# Patient Record
Sex: MALE | Employment: OTHER | URBAN - METROPOLITAN AREA
[De-identification: names, ages, dates, MRNs, and addresses within clinical notes are randomized per-mention and may not be internally consistent; named-entity substitution may affect disease eponyms.]

---

## 2017-01-03 ENCOUNTER — APPOINTMENT (OUTPATIENT)
Dept: GENERAL RADIOLOGY | Facility: CLINIC | Age: 67
DRG: 286 | End: 2017-01-03
Attending: EMERGENCY MEDICINE
Payer: MEDICARE

## 2017-01-03 ENCOUNTER — HOSPITAL ENCOUNTER (INPATIENT)
Facility: CLINIC | Age: 67
LOS: 17 days | Discharge: HOME HEALTH | DRG: 286 | End: 2017-01-22
Attending: EMERGENCY MEDICINE | Admitting: INTERNAL MEDICINE
Payer: MEDICARE

## 2017-01-03 DIAGNOSIS — M62.81 GENERALIZED MUSCLE WEAKNESS: ICD-10-CM

## 2017-01-03 DIAGNOSIS — Z98.61 CAD S/P PERCUTANEOUS CORONARY ANGIOPLASTY: ICD-10-CM

## 2017-01-03 DIAGNOSIS — B20 HUMAN IMMUNODEFICIENCY VIRUS (HIV) DISEASE (H): ICD-10-CM

## 2017-01-03 DIAGNOSIS — I25.10 CAD S/P PERCUTANEOUS CORONARY ANGIOPLASTY: ICD-10-CM

## 2017-01-03 DIAGNOSIS — R53.81 PHYSICAL DECONDITIONING: ICD-10-CM

## 2017-01-03 DIAGNOSIS — N18.6 ESRD (END STAGE RENAL DISEASE) ON DIALYSIS (H): ICD-10-CM

## 2017-01-03 DIAGNOSIS — J96.90 RESPIRATORY FAILURE, UNSPECIFIED CHRONICITY, UNSPECIFIED WHETHER WITH HYPOXIA OR HYPERCAPNIA (H): ICD-10-CM

## 2017-01-03 DIAGNOSIS — Z99.2 ESRD (END STAGE RENAL DISEASE) ON DIALYSIS (H): ICD-10-CM

## 2017-01-03 DIAGNOSIS — R07.9 CHEST PAIN, UNSPECIFIED TYPE: ICD-10-CM

## 2017-01-03 DIAGNOSIS — J18.9 PNEUMONIA OF BOTH LUNGS DUE TO INFECTIOUS ORGANISM, UNSPECIFIED PART OF LUNG: Primary | ICD-10-CM

## 2017-01-03 DIAGNOSIS — R06.02 SOB (SHORTNESS OF BREATH): ICD-10-CM

## 2017-01-03 LAB
ANION GAP SERPL CALCULATED.3IONS-SCNC: 10 MMOL/L (ref 3–14)
BASOPHILS # BLD AUTO: 0 10E9/L (ref 0–0.2)
BASOPHILS NFR BLD AUTO: 0.2 %
BUN SERPL-MCNC: 27 MG/DL (ref 7–30)
CALCIUM SERPL-MCNC: 9 MG/DL (ref 8.5–10.1)
CHLORIDE SERPL-SCNC: 93 MMOL/L (ref 94–109)
CO2 BLDCOV-SCNC: 30 MMOL/L (ref 21–28)
CO2 SERPL-SCNC: 30 MMOL/L (ref 20–32)
CREAT SERPL-MCNC: 4.37 MG/DL (ref 0.66–1.25)
DIFFERENTIAL METHOD BLD: ABNORMAL
EOSINOPHIL # BLD AUTO: 0.1 10E9/L (ref 0–0.7)
EOSINOPHIL NFR BLD AUTO: 1.3 %
ERYTHROCYTE [DISTWIDTH] IN BLOOD BY AUTOMATED COUNT: 16.1 % (ref 10–15)
GFR SERPL CREATININE-BSD FRML MDRD: 14 ML/MIN/1.7M2
GLUCOSE BLDC GLUCOMTR-MCNC: 152 MG/DL (ref 70–99)
GLUCOSE BLDC GLUCOMTR-MCNC: 206 MG/DL (ref 70–99)
GLUCOSE SERPL-MCNC: 164 MG/DL (ref 70–99)
HCT VFR BLD AUTO: 23 % (ref 40–53)
HGB BLD-MCNC: 7.5 G/DL (ref 13.3–17.7)
IMM GRANULOCYTES # BLD: 0.1 10E9/L (ref 0–0.4)
IMM GRANULOCYTES NFR BLD: 0.6 %
INTERPRETATION ECG - MUSE: NORMAL
LACTATE BLD-SCNC: 1.6 MMOL/L (ref 0.7–2.1)
LYMPHOCYTES # BLD AUTO: 0.5 10E9/L (ref 0.8–5.3)
LYMPHOCYTES NFR BLD AUTO: 6 %
MCH RBC QN AUTO: 28.5 PG (ref 26.5–33)
MCHC RBC AUTO-ENTMCNC: 32.6 G/DL (ref 31.5–36.5)
MCV RBC AUTO: 88 FL (ref 78–100)
MONOCYTES # BLD AUTO: 0.7 10E9/L (ref 0–1.3)
MONOCYTES NFR BLD AUTO: 7.5 %
NEUTROPHILS # BLD AUTO: 7.6 10E9/L (ref 1.6–8.3)
NEUTROPHILS NFR BLD AUTO: 84.4 %
NRBC # BLD AUTO: 0 10*3/UL
NRBC BLD AUTO-RTO: 0 /100
PCO2 BLDV: 42 MM HG (ref 40–50)
PH BLDV: 7.47 PH (ref 7.32–7.43)
PLATELET # BLD AUTO: 139 10E9/L (ref 150–450)
PO2 BLDV: 29 MM HG (ref 25–47)
POTASSIUM SERPL-SCNC: 3.3 MMOL/L (ref 3.4–5.3)
RBC # BLD AUTO: 2.63 10E12/L (ref 4.4–5.9)
SAO2 % BLDV FROM PO2: 59 %
SODIUM SERPL-SCNC: 133 MMOL/L (ref 133–144)
TROPONIN I SERPL-MCNC: NORMAL UG/L (ref 0–0.04)
WBC # BLD AUTO: 9 10E9/L (ref 4–11)

## 2017-01-03 PROCEDURE — 71020 XR CHEST 2 VW: CPT

## 2017-01-03 PROCEDURE — 96372 THER/PROPH/DIAG INJ SC/IM: CPT

## 2017-01-03 PROCEDURE — 83605 ASSAY OF LACTIC ACID: CPT

## 2017-01-03 PROCEDURE — 25000132 ZZH RX MED GY IP 250 OP 250 PS 637: Mod: GY | Performed by: INTERNAL MEDICINE

## 2017-01-03 PROCEDURE — 82803 BLOOD GASES ANY COMBINATION: CPT

## 2017-01-03 PROCEDURE — 85025 COMPLETE CBC W/AUTO DIFF WBC: CPT | Performed by: EMERGENCY MEDICINE

## 2017-01-03 PROCEDURE — 83036 HEMOGLOBIN GLYCOSYLATED A1C: CPT | Performed by: INTERNAL MEDICINE

## 2017-01-03 PROCEDURE — 80048 BASIC METABOLIC PNL TOTAL CA: CPT | Performed by: EMERGENCY MEDICINE

## 2017-01-03 PROCEDURE — 36415 COLL VENOUS BLD VENIPUNCTURE: CPT | Performed by: INTERNAL MEDICINE

## 2017-01-03 PROCEDURE — 84484 ASSAY OF TROPONIN QUANT: CPT | Performed by: EMERGENCY MEDICINE

## 2017-01-03 PROCEDURE — 93005 ELECTROCARDIOGRAM TRACING: CPT

## 2017-01-03 PROCEDURE — 99223 1ST HOSP IP/OBS HIGH 75: CPT | Mod: AI | Performed by: INTERNAL MEDICINE

## 2017-01-03 PROCEDURE — 99285 EMERGENCY DEPT VISIT HI MDM: CPT | Mod: 25

## 2017-01-03 PROCEDURE — 84484 ASSAY OF TROPONIN QUANT: CPT | Performed by: INTERNAL MEDICINE

## 2017-01-03 PROCEDURE — 00000146 ZZHCL STATISTIC GLUCOSE BY METER IP

## 2017-01-03 PROCEDURE — G0378 HOSPITAL OBSERVATION PER HR: HCPCS

## 2017-01-03 PROCEDURE — A9270 NON-COVERED ITEM OR SERVICE: HCPCS | Mod: GY | Performed by: INTERNAL MEDICINE

## 2017-01-03 PROCEDURE — 25000132 ZZH RX MED GY IP 250 OP 250 PS 637: Mod: GY

## 2017-01-03 PROCEDURE — A9270 NON-COVERED ITEM OR SERVICE: HCPCS | Mod: GY

## 2017-01-03 RX ORDER — NICOTINE POLACRILEX 4 MG
15-30 LOZENGE BUCCAL
Status: DISCONTINUED | OUTPATIENT
Start: 2017-01-03 | End: 2017-01-15

## 2017-01-03 RX ORDER — FOLIC AC/VIT BCOMP,C/ZN/VIT D3 0.8MG-2
1 TABLET ORAL DAILY
COMMUNITY
End: 2017-08-11

## 2017-01-03 RX ORDER — DAPSONE 25 MG/1
100 TABLET ORAL AT BEDTIME
Status: DISCONTINUED | OUTPATIENT
Start: 2017-01-03 | End: 2017-01-09

## 2017-01-03 RX ORDER — GABAPENTIN 300 MG/1
300 CAPSULE ORAL EVERY MORNING
Status: DISCONTINUED | OUTPATIENT
Start: 2017-01-04 | End: 2017-01-07

## 2017-01-03 RX ORDER — HYDROMORPHONE HCL/0.9% NACL/PF 0.2MG/0.2
0.2 SYRINGE (ML) INTRAVENOUS
Status: DISCONTINUED | OUTPATIENT
Start: 2017-01-03 | End: 2017-01-05

## 2017-01-03 RX ORDER — ISOSORBIDE MONONITRATE 30 MG/1
60 TABLET, EXTENDED RELEASE ORAL EVERY EVENING
Status: DISCONTINUED | OUTPATIENT
Start: 2017-01-03 | End: 2017-01-07

## 2017-01-03 RX ORDER — CLOPIDOGREL BISULFATE 75 MG/1
75 TABLET ORAL EVERY EVENING
Status: DISCONTINUED | OUTPATIENT
Start: 2017-01-03 | End: 2017-01-22 | Stop reason: HOSPADM

## 2017-01-03 RX ORDER — ABACAVIR 300 MG/1
600 TABLET ORAL EVERY EVENING
Status: DISCONTINUED | OUTPATIENT
Start: 2017-01-03 | End: 2017-01-22 | Stop reason: HOSPADM

## 2017-01-03 RX ORDER — MAGNESIUM OXIDE 400 MG/1
400 TABLET ORAL AT BEDTIME
Status: DISCONTINUED | OUTPATIENT
Start: 2017-01-03 | End: 2017-01-09

## 2017-01-03 RX ORDER — CHLORHEXIDINE GLUCONATE ORAL RINSE 1.2 MG/ML
15 SOLUTION DENTAL 2 TIMES DAILY
Status: DISCONTINUED | OUTPATIENT
Start: 2017-01-03 | End: 2017-01-22 | Stop reason: HOSPADM

## 2017-01-03 RX ORDER — CARVEDILOL 12.5 MG/1
12.5 TABLET ORAL 2 TIMES DAILY WITH MEALS
Status: DISCONTINUED | OUTPATIENT
Start: 2017-01-03 | End: 2017-01-07

## 2017-01-03 RX ORDER — AMLODIPINE BESYLATE 5 MG/1
5 TABLET ORAL AT BEDTIME
Status: DISCONTINUED | OUTPATIENT
Start: 2017-01-03 | End: 2017-01-07

## 2017-01-03 RX ORDER — GABAPENTIN 300 MG/1
600 CAPSULE ORAL EVERY EVENING
Status: DISCONTINUED | OUTPATIENT
Start: 2017-01-03 | End: 2017-01-07

## 2017-01-03 RX ORDER — NALOXONE HYDROCHLORIDE 0.4 MG/ML
.1-.4 INJECTION, SOLUTION INTRAMUSCULAR; INTRAVENOUS; SUBCUTANEOUS
Status: DISCONTINUED | OUTPATIENT
Start: 2017-01-03 | End: 2017-01-07

## 2017-01-03 RX ORDER — LOSARTAN POTASSIUM 100 MG/1
100 TABLET ORAL AT BEDTIME
Status: DISCONTINUED | OUTPATIENT
Start: 2017-01-03 | End: 2017-01-07

## 2017-01-03 RX ORDER — PRENATAL VIT 91/IRON/FOLIC/DHA 28-975-200
COMBINATION PACKAGE (EA) ORAL 2 TIMES DAILY PRN
Status: DISCONTINUED | OUTPATIENT
Start: 2017-01-03 | End: 2017-01-22 | Stop reason: HOSPADM

## 2017-01-03 RX ORDER — ONDANSETRON 4 MG/1
4 TABLET, ORALLY DISINTEGRATING ORAL EVERY 6 HOURS PRN
Status: DISCONTINUED | OUTPATIENT
Start: 2017-01-03 | End: 2017-01-22 | Stop reason: HOSPADM

## 2017-01-03 RX ORDER — NITROGLYCERIN 0.4 MG/1
0.4 TABLET SUBLINGUAL EVERY 5 MIN PRN
Status: DISCONTINUED | OUTPATIENT
Start: 2017-01-03 | End: 2017-01-08

## 2017-01-03 RX ORDER — FOLIC AC/VIT BCOMP,C/ZN/VIT D3 0.8MG-2
1 TABLET ORAL DAILY
Status: DISCONTINUED | OUTPATIENT
Start: 2017-01-03 | End: 2017-01-03 | Stop reason: CLARIF

## 2017-01-03 RX ORDER — MYCOPHENOLATE MOFETIL 250 MG/1
500 CAPSULE ORAL 2 TIMES DAILY
Status: DISCONTINUED | OUTPATIENT
Start: 2017-01-03 | End: 2017-01-13

## 2017-01-03 RX ORDER — RITONAVIR 100 MG/1
100 TABLET ORAL AT BEDTIME
Status: DISCONTINUED | OUTPATIENT
Start: 2017-01-03 | End: 2017-01-22 | Stop reason: HOSPADM

## 2017-01-03 RX ORDER — BISACODYL 10 MG
10 SUPPOSITORY, RECTAL RECTAL DAILY PRN
Status: DISCONTINUED | OUTPATIENT
Start: 2017-01-03 | End: 2017-01-22 | Stop reason: HOSPADM

## 2017-01-03 RX ORDER — HYDROCORTISONE ACETATE 25 MG/1
25 SUPPOSITORY RECTAL 2 TIMES DAILY PRN
Status: DISCONTINUED | OUTPATIENT
Start: 2017-01-03 | End: 2017-01-22 | Stop reason: HOSPADM

## 2017-01-03 RX ORDER — ONDANSETRON 2 MG/ML
4 INJECTION INTRAMUSCULAR; INTRAVENOUS EVERY 6 HOURS PRN
Status: DISCONTINUED | OUTPATIENT
Start: 2017-01-03 | End: 2017-01-22 | Stop reason: HOSPADM

## 2017-01-03 RX ORDER — DEXTROSE MONOHYDRATE 25 G/50ML
25-50 INJECTION, SOLUTION INTRAVENOUS
Status: DISCONTINUED | OUTPATIENT
Start: 2017-01-03 | End: 2017-01-15

## 2017-01-03 RX ORDER — ACETAMINOPHEN 325 MG/1
650 TABLET ORAL EVERY 4 HOURS PRN
Status: DISCONTINUED | OUTPATIENT
Start: 2017-01-03 | End: 2017-01-04

## 2017-01-03 RX ORDER — HYDRALAZINE HYDROCHLORIDE 25 MG/1
25 TABLET, FILM COATED ORAL 3 TIMES DAILY
Status: DISCONTINUED | OUTPATIENT
Start: 2017-01-03 | End: 2017-01-07

## 2017-01-03 RX ORDER — ASPIRIN 81 MG/1
81 TABLET, CHEWABLE ORAL EVERY EVENING
Status: DISCONTINUED | OUTPATIENT
Start: 2017-01-03 | End: 2017-01-09

## 2017-01-03 RX ORDER — CLONAZEPAM 0.5 MG/1
0.5 TABLET ORAL
Status: DISCONTINUED | OUTPATIENT
Start: 2017-01-03 | End: 2017-01-09

## 2017-01-03 RX ORDER — ATORVASTATIN CALCIUM 40 MG/1
40 TABLET, FILM COATED ORAL AT BEDTIME
Status: DISCONTINUED | OUTPATIENT
Start: 2017-01-03 | End: 2017-01-09

## 2017-01-03 RX ADMIN — ATORVASTATIN CALCIUM 40 MG: 40 TABLET, FILM COATED ORAL at 21:02

## 2017-01-03 RX ADMIN — CALCIUM ACETATE 2001 MG: 667 CAPSULE ORAL at 17:51

## 2017-01-03 RX ADMIN — RITONAVIR 100 MG: 100 TABLET, FILM COATED ORAL at 21:02

## 2017-01-03 RX ADMIN — DAPSONE 100 MG: 25 TABLET ORAL at 21:01

## 2017-01-03 RX ADMIN — TERBINAFINE HYDROCHLORIDE: 1 CREAM TOPICAL at 22:08

## 2017-01-03 RX ADMIN — GABAPENTIN 600 MG: 300 CAPSULE ORAL at 19:28

## 2017-01-03 RX ADMIN — ISOSORBIDE MONONITRATE 60 MG: 30 TABLET, EXTENDED RELEASE ORAL at 19:28

## 2017-01-03 RX ADMIN — MAGNESIUM OXIDE TAB 400 MG (241.3 MG ELEMENTAL MG) 400 MG: 400 (241.3 MG) TAB at 21:01

## 2017-01-03 RX ADMIN — ASPIRIN 81 MG 81 MG: 81 TABLET ORAL at 19:28

## 2017-01-03 RX ADMIN — MYCOPHENOLATE MOFETIL 500 MG: 250 CAPSULE ORAL at 21:01

## 2017-01-03 RX ADMIN — AMLODIPINE BESYLATE 5 MG: 5 TABLET ORAL at 21:02

## 2017-01-03 RX ADMIN — HYDRALAZINE HYDROCHLORIDE 25 MG: 25 TABLET ORAL at 17:51

## 2017-01-03 RX ADMIN — DARUNAVIR 800 MG: 800 TABLET, FILM COATED ORAL at 19:28

## 2017-01-03 RX ADMIN — CHLORHEXIDINE GLUCONATE 15 ML: 1.2 RINSE ORAL at 21:01

## 2017-01-03 RX ADMIN — CARVEDILOL 12.5 MG: 12.5 TABLET, FILM COATED ORAL at 17:51

## 2017-01-03 RX ADMIN — LOSARTAN POTASSIUM 100 MG: 100 TABLET, FILM COATED ORAL at 21:01

## 2017-01-03 RX ADMIN — HYDRALAZINE HYDROCHLORIDE 25 MG: 25 TABLET ORAL at 21:08

## 2017-01-03 RX ADMIN — CLOPIDOGREL 75 MG: 75 TABLET, FILM COATED ORAL at 19:28

## 2017-01-03 ASSESSMENT — ACTIVITIES OF DAILY LIVING (ADL)
DRESS: 0-->INDEPENDENT
BATHING: 0-->INDEPENDENT
FALL_HISTORY_WITHIN_LAST_SIX_MONTHS: NO
TRANSFERRING: 0-->INDEPENDENT
SWALLOWING: 0-->SWALLOWS FOODS/LIQUIDS WITHOUT DIFFICULTY
RETIRED_EATING: 0-->INDEPENDENT
RETIRED_COMMUNICATION: 0-->UNDERSTANDS/COMMUNICATES WITHOUT DIFFICULTY
COGNITION: 0 - NO COGNITION ISSUES REPORTED
TOILETING: 0-->INDEPENDENT
AMBULATION: 0-->INDEPENDENT

## 2017-01-03 NOTE — ED NOTES
Pt presents to ED via EMS c/o chest pain. Also reports SOB. Symptoms started early this morning. Per EMS, pt has been feeling poorly the last few days. Dialysis session earlier this morning. Pt is A&O x4, ABCs intact.

## 2017-01-03 NOTE — PHARMACY-ADMISSION MEDICATION HISTORY
Admission medication history interview status for this patient is complete. See [facility] admission navigator for allergy information, prior to admission medications and immunization status.     Medication history interview source(s):Patient  Medication history resources (including written lists, pill bottles, clinic record):None  Primary pharmacy:[facility]    Changes made to PTA medication list:  Added: fish oil, dialyvite, humalog sliding scale  Deleted: none  Changed: Cellcept - pt has been taking daily, but it is prescribed BID    Actions taken by pharmacist (provider contacted, etc):None     Additional medication history information:None    Medication reconciliation/reorder completed by provider prior to medication history? No    For patients on insulin therapy: Yes (Yes/No)  Lantus/levemir/NPH/Mix 70/30 dose:  __50___   in AM   Sliding scale Novolog Y  If Yes, do you have a baseline novolog pre-meal dose:  ____15__units with meals   Patients eat three meals a day:   Y     Any Barriers to therapy:  cost of medications/comfortable with giving injections (if applicable)/ comfortable and confident with current diabetes regimen  N      Prior to Admission medications    Medication Sig Last Dose Taking? Auth Provider   insulin lispro (HUMALOG KWIKPEN) 100 UNIT/ML injection Inject Subcutaneous 3 times daily (before meals) Sliding scale - 2 units per 50 over 150  Yes Unknown, Entered By History   Multiple Vitamins-Minerals (DIALYVITE 800/ULTRA D) TABS Take 1 tablet by mouth daily 1/2/2017 at Unknown time Yes Unknown, Entered By History   Omega-3 Fatty Acids (OMEGA-3 FISH OIL PO) Take 2 g by mouth 2 times daily (with meals) 1/2/2017 at Unknown time Yes Unknown, Entered By History   mycophenolate (CELLCEPT - GENERIC EQUIVALENT) 500 MG tablet Take 500 mg by mouth 2 times daily On an empty stomach 1/3/2017 at 0200 Yes Reported, Patient   isosorbide mononitrate (IMDUR) 30 MG 24 hr tablet Take 2 tablets (60 mg) by mouth  every evening 1/2/2017 at Unknown time Yes Glenna Fernández MD   hydrALAZINE (APRESOLINE) 50 MG tablet Take 0.5 tablets (25 mg) by mouth 3 times daily 1/2/2017 at Unknown time Yes Glenna Fernández MD   losartan (COZAAR) 100 MG tablet Take 1 tablet (100 mg) by mouth At Bedtime 1/2/2017 at Unknown time Yes Glenna Fernández MD   carvedilol (COREG) 12.5 MG tablet Take 1 tablet (12.5 mg) by mouth 2 times daily (with meals) 1/2/2017 at Unknown time Yes Glenna Fernández MD   amLODIPine (NORVASC) 5 MG tablet Take 1 tablet (5 mg) by mouth At Bedtime 1/2/2017 at Unknown time Yes Glenna Fernández MD   gabapentin (NEURONTIN) 300 MG capsule Take 600 mg by mouth every evening 1/2/2017 at Unknown time Yes Unknown, Entered By History   insulin glargine (LANTUS) 100 UNIT/ML PEN Inject 50 Units Subcutaneous every morning 1/2/2017 at Unknown time Yes Unknown, Entered By History   insulin lispro (HUMALOG KWIKPEN) 100 UNIT/ML soln Inject 15 Units Subcutaneous 3 times daily (before meals) 1/2/2017 at Unknown time Yes Unknown, Entered By History   nystatin (MYCOSTATIN) cream Apply topically daily as needed for dry skin  Yes Reported, Patient   imiquimod (ALDARA) 5 % cream Apply topically as needed  Yes Reported, Patient   DOXERCALCIFEROL IV Inject 6 mcg into the vein three times a week  Yes Reported, Patient   ASPIRIN PO Take 81 mg by mouth every evening  1/2/2017 at Unknown time Yes Unknown, Entered By History   CLONAZEPAM PO Take 0.5 mg by mouth nightly as needed for anxiety (restless legs)  Yes Unknown, Entered By History   CLOPIDOGREL BISULFATE PO Take 75 mg by mouth every evening  1/2/2017 at Unknown time Yes Unknown, Entered By History   abacavir (ZIAGEN) 300 MG tablet Take 600 mg by mouth every evening  1/2/2017 at Unknown time Yes Abstract, Provider   calcium acetate (PHOSLO) 667 MG CAPS 2,001 mg 3 times daily (with meals) Take 3 capsules three times a day with meals 1/2/2017 at Unknown time Yes Abstract, Provider   chlorhexidine  "(CHLORHEXIDINE) 0.12 % solution Rinse and gargle 15 ml by mouth twice a day as directed. 1/2/2017 at Unknown time Yes Abstract, Provider   hydrocortisone (ANUCORT-HC) 25 MG suppository Place 25 mg rectally 2 times daily as needed   Yes Abstract, Provider   terbinafine (LAMISIL AT) 1 % cream Apply topically 2 times daily as needed   Yes Abstract, Provider   atorvastatin (LIPITOR) 40 MG tablet Take 40 mg by mouth At Bedtime 1/2/2017 at Unknown time Yes Unknown, Entered By History   epoetin brittni (EPOGEN,PROCRIT) 62150 UNIT/ML injection Inject 11,000 Units Subcutaneous three times a week WITH DIALYSIS  Yes Unknown, Entered By History   iron sucrose (VENOFER) 20 MG/ML injection Inject 50 mg into the vein once a week WIth dialysis  Yes Unknown, Entered By History   MAGNESIUM OXIDE PO Take 400 mg by mouth At Bedtime 1/2/2017 at Unknown time Yes Unknown, Entered By History   dolutegravir (TIVICAY) 50 MG tablet Take 50 mg by mouth At Bedtime 1/2/2017 at Unknown time Yes Unknown, Entered By History   nitroglycerin (NITROSTAT) 0.4 MG SL tablet One tablet under the tongue every 5 minutes if needed for chest pain. May repeat every 5 minutes for a maximum of 3 doses in 15 minutes\"  Yes Lay Paz MD   gabapentin (NEURONTIN) 300 MG capsule Take 300 mg by mouth every morning  1/2/2017 at Unknown time Yes Unknown, Entered By History   dapsone 100 MG tablet Take 100 mg by mouth At Bedtime  1/2/2017 at Unknown time Yes Reported, Patient   Darunavir Ethanolate (PREZISTA PO) Take 800 mg by mouth At Bedtime. 1/2/2017 at Unknown time Yes Reported, Patient   ritonavir (NORVIR) 100 MG capsule Take 1 capsule by mouth At Bedtime  1/2/2017 at Unknown time Yes Reported, Patient             "

## 2017-01-03 NOTE — IP AVS SNAPSHOT
MRN:[id number]                      After Visit Summary   1/3/2017    Donald Raza    MRN: [id number]           Thank you!     Thank you for choosing [facility] for your care. Our goal is always to provide you with excellent care. Hearing back from our patients is one way we can continue to improve our services. Please take a few minutes to complete the written survey that you may receive in the mail after you visit. If you would like to speak to someone directly about your visit please contact Patient Relations at [phone]. Thank you!          Patient Information     Date Of Birth          1950        About your hospital stay     You were admitted on:  January 3, 2017 You last received care in the:  [place] [address] [facility]    You were discharged on:  January 22, 2017        Reason for your hospital stay       Admitted for respiratory failure secondary to bilateral Pneumonia                  Who to Call     For medical emergencies, please call 911.  For non-urgent questions about your medical care, please call your primary care provider or clinic, [phone]          Attending Provider     Provider    Ladi Baumann MD Mahoney, MD Clyde Christiansen, MD Sharee Lee Kendall D, DO Jaleta, Lorna Castillo MD       Primary Care Provider Office Phone # Fax #    [facility] [place] [facility] [phone] [address] [place] [address]        After Care Instructions     Activity       Your activity upon discharge: activity as tolerated            Diet       Follow this diet upon discharge: dialysis diet                  Follow-up Appointments     Follow-up and recommended labs and tests        Follow up with primary care provider, [facility] [place] [facility], within 7 days to evaluate medication change, to evaluate treatment change and for hospital follow- up.  No follow up labs  or test are needed.  Follow up with hemodialysis as scheduled                  Additional Services     Home Care OT Referral for Hospital Discharge       OT to eval and treat    Your provider has ordered home care - occupational therapy. If you have not been contacted within 2 days of your discharge please call the department phone number listed on the top of this document.            Home Care PT Referral for Hospital Discharge       PT to eval and treat    Your provider has ordered home care - physical therapy. If you have not been contacted within 2 days of your discharge please call the department phone number listed on the top of this document.            Home care nursing referral       RN extended hours visit. RN to assess vital signs and weight, respiratory and cardiac status, patients ability to take and record daily blood pressure, temp and weight and hydration, nutrition and bowel status.  Home health aide to assist patient on his activity of daily living    Your provider has ordered home care nursing services. If you have not been contacted within 2 days of your discharge please call the inpatient department phone number at [phone] .                  Further instructions from your care team         Pneumonia (Adult)  Pneumonia is an infection deep within the lungs. It is in the small air sacs (alveoli). Pneumonia may be caused by a virus or bacteria. Pneumonia caused by bacteria is usually treated with an antibiotic. Severe cases may need to be treated in the hospital. Milder cases can be treated at home. Symptoms usually start to get better during the first 2 days of treatment.    Home care  Follow these guidelines when caring for yourself at home:    Rest at home for the first 2 to 3 days, or until you feel stronger. Don t let yourself get overly tired when you go back to your activities.    Stay away from cigarette smoke - yours or other people s.    You may use acetaminophen or ibuprofen to  control fever or pain, unless another medicine was prescribed. If you have chronic liver or kidney disease, talk with your health care provider before using these medicines. Also talk with your provider if you ve had a stomach ulcer or GI bleeding. Don t give aspirin to anyone younger than 18 years of age who is ill with a fever. It may cause severe liver damage.    Your appetite may be poor, so a light diet is fine.    Drink 6 to 8 glasses of fluids every day to make sure you are getting enough fluids. Beverages can include water, sport drinks, sodas without caffeine, juices, tea, or soup. Fluids will help loosen secretions in the lung. This will make it easier for you to cough up the phlegm (sputum). If you also have heart or kidney disease, check with your health care provider before you drink extra fluids.    Take antibiotic medicine prescribed until it is all gone, even if you are feeling better after a few days.  Follow-up care  Follow up with your health care provider in the next 2 to 3 days, or as advised. This is to be sure the medicine is helping you get better.  If you are 65 or older, you should get a pneumococcal vaccine and a yearly flu (influenza) shot. You should also get these vaccines if you have chronic lung disease like asthma, emphysema, or COPD. Ask your provider about this.  When to seek medical advice  Call your health care provider right away if any of these occur:    You don t get better within the first 48 hours of treatment    Shortness of breath gets worse    Rapid breathing (more than 25 breaths per minute)    Coughing up blood    Chest pain gets worse with breathing    Fever of 102 F (38 C) or higher that doesn t get better with fever medicine    Weakness, dizziness, or fainting that gets worse    Thirst or dry mouth that gets worse    Sinus pain, headache, or a stiff neck    Chest pain not caused by coughing    0899-1187 [employer]. [address] [facility], [place], [place]  "[address]. All rights reserved. This information is not intended as a substitute for professional medical care. Always follow your healthcare professional's instructions.          Pending Results     No orders found from 2017 to 2017.            Statement of Approval     Ordered          17 1247  I have reviewed and agree with all the recommendations and orders detailed in this document.   EFFECTIVE NOW     Approved and electronically signed by:  Tam Fraser MD             Admission Information        Provider Department Dept Phone    1/3/2017 Lorna Jhaveri MD, MD  [facility] [phone]      Your Vitals Were     Blood Pressure Pulse Temperature    135/62 mmHg 73 98.1  F (36.7  C) (Oral)    Respirations Height Weight    18 1.803 m (5' 11\") 82.6 kg (182 lb 1.6 oz)    BMI (Body Mass Index) Pulse Oximetry       25.41 kg/m2 94%       MyChart Information     [employer] lets you send messages to your doctor, view your test results, renew your prescriptions, schedule appointments and more. To sign up, go to [email or web address] . Click on \"Log in\" on the left side of the screen, which will take you to the Welcome page. Then click on \"Sign up Now\" on the right side of the page.     You will be asked to enter the access code listed below, as well as some personal information. Please follow the directions to create your username and password.     Your access code is: [email or web address]  Expires: 2017  3:48 PM     Your access code will  in 90 days. If you need help or a new code, please call your [place] clinic or [phone].        Care EveryWhere ID     This is your Care EveryWhere ID. This could be used by other organizations to access your [place] medical records  [id number]           Review of your medicines      START taking        Dose / Directions    order for DME   Used for:  SOB (shortness of breath), Generalized muscle weakness        Equipment being ordered: " Other: 4WW Treatment Diagnosis: Decreased activity tolerance   Quantity:  1 each   Refills:  0         CONTINUE these medicines which have NOT CHANGED        Dose / Directions    abacavir 300 MG tablet   Commonly known as:  ZIAGEN        Dose:  600 mg   Take 600 mg by mouth every evening   Refills:  0       amLODIPine 5 MG tablet   Commonly known as:  NORVASC   Used for:  Hypertension secondary to other renal disorders        Dose:  5 mg   Take 1 tablet (5 mg) by mouth At Bedtime   Quantity:  30 tablet   Refills:  0       ANUCORT-HC 25 MG Suppository   Generic drug:  hydrocortisone        Dose:  25 mg   Place 25 mg rectally 2 times daily as needed   Refills:  0       ASPIRIN PO        Dose:  81 mg   Take 81 mg by mouth every evening   Refills:  0       atorvastatin 40 MG tablet   Commonly known as:  LIPITOR        Dose:  40 mg   Take 40 mg by mouth At Bedtime   Refills:  0       calcium acetate 667 MG Caps capsule   Commonly known as:  PHOSLO        Dose:  2001 mg   2,001 mg 3 times daily (with meals) Take 3 capsules three times a day with meals   Refills:  0       carvedilol 12.5 MG tablet   Commonly known as:  COREG   Used for:  Coronary artery disease involving native heart, angina presence unspecified, unspecified vessel or lesion type        Dose:  12.5 mg   Take 1 tablet (12.5 mg) by mouth 2 times daily (with meals)   Quantity:  60 tablet   Refills:  0       chlorhexidine 0.12 % solution   Commonly known as:  PERIDEX        Rinse and gargle 15 ml by mouth twice a day as directed.   Refills:  0       CLONAZEPAM PO        Dose:  0.5 mg   Take 0.5 mg by mouth nightly as needed for anxiety (restless legs)   Refills:  0       CLOPIDOGREL BISULFATE PO        Dose:  75 mg   Take 75 mg by mouth every evening   Refills:  0       dapsone 100 MG tablet        Dose:  100 mg   Take 100 mg by mouth At Bedtime   Refills:  0       DIALYVITE 800/ULTRA D Tabs        Dose:  1 tablet   Take 1 tablet by mouth daily   Refills:  0        dolutegravir 50 MG tablet   Commonly known as:  TIVICAY        Dose:  50 mg   Take 50 mg by mouth At Bedtime   Refills:  0       DOXERCALCIFEROL IV        Dose:  6 mcg   Inject 6 mcg into the vein three times a week   Refills:  0       epoetin brittni 07399 UNIT/ML injection   Commonly known as:  EPOGEN/PROCRIT        Dose:  69346 Units   Inject 11,000 Units Subcutaneous three times a week WITH DIALYSIS   Refills:  0       * gabapentin 300 MG capsule   Commonly known as:  NEURONTIN        Dose:  300 mg   Take 300 mg by mouth every morning   Refills:  0       * gabapentin 300 MG capsule   Commonly known as:  NEURONTIN        Dose:  600 mg   Take 600 mg by mouth every evening   Refills:  0       * HumaLOG KWIKpen 100 UNIT/ML injection   Generic drug:  insulin lispro        Dose:  15 Units   Inject 15 Units Subcutaneous 3 times daily (before meals)   Refills:  0       * HumaLOG KWIKpen 100 UNIT/ML injection   Generic drug:  insulin lispro        Inject Subcutaneous 3 times daily (before meals) Sliding scale - 2 units per 50 over 150   Refills:  0       hydrALAZINE 50 MG tablet   Commonly known as:  APRESOLINE   Used for:  Hypertension secondary to other renal disorders        Dose:  25 mg   Take 0.5 tablets (25 mg) by mouth 3 times daily   Quantity:  90 tablet   Refills:  0       imiquimod 5 % cream   Commonly known as:  ALDARA        Apply topically as needed   Refills:  0       insulin glargine 100 UNIT/ML injection   Commonly known as:  LANTUS        Dose:  50 Units   Inject 50 Units Subcutaneous every morning   Refills:  0       iron sucrose 20 MG/ML injection   Commonly known as:  VENOFER        Dose:  50 mg   Inject 50 mg into the vein once a week WIth dialysis   Refills:  0       isosorbide mononitrate 30 MG 24 hr tablet   Commonly known as:  IMDUR   Used for:  Coronary artery disease involving native heart, angina presence unspecified, unspecified vessel or lesion type, Hypertension secondary to other renal  "disorders        Dose:  60 mg   Take 2 tablets (60 mg) by mouth every evening   Quantity:  30 tablet   Refills:  0       lamISIL AT 1 % cream   Generic drug:  terbinafine        Apply topically 2 times daily as needed   Refills:  0       losartan 100 MG tablet   Commonly known as:  COZAAR   Used for:  Hypertension secondary to other renal disorders        Dose:  100 mg   Take 1 tablet (100 mg) by mouth At Bedtime   Quantity:  30 tablet   Refills:  0       MAGNESIUM OXIDE PO        Dose:  400 mg   Take 400 mg by mouth At Bedtime   Refills:  0       mycophenolate 500 MG tablet   Commonly known as:  CELLCEPT - GENERIC EQUIVALENT        Dose:  500 mg   Take 500 mg by mouth 2 times daily On an empty stomach   Refills:  0       nitroglycerin 0.4 MG sublingual tablet   Commonly known as:  NITROSTAT   Used for:  Coronary artery disease due to lipid rich plaque, Status post coronary angioplasty        One tablet under the tongue every 5 minutes if needed for chest pain. May repeat every 5 minutes for a maximum of 3 doses in 15 minutes\"   Quantity:  25 tablet   Refills:  3       nystatin cream   Commonly known as:  MYCOSTATIN        Apply topically daily as needed for dry skin   Refills:  0       OMEGA-3 FISH OIL PO        Dose:  2 g   Take 2 g by mouth 2 times daily (with meals)   Refills:  0       PREZISTA PO        Dose:  800 mg   Take 800 mg by mouth At Bedtime.   Refills:  0       ritonavir 100 MG capsule   Commonly known as:  NORVIR        Dose:  1 capsule   Take 1 capsule by mouth At Bedtime   Refills:  0       * Notice:  This list has 4 medication(s) that are the same as other medications prescribed for you. Read the directions carefully, and ask your doctor or other care provider to review them with you.         Where to get your medicines      Some of these will need a paper prescription and others can be bought over the counter. Ask your nurse if you have questions.     Bring a paper prescription for each of these " medications    - order for DME             Protect others around you: Learn how to safely use, store and throw away your medicines at www.disposemymeds.org.             Medication List: This is a list of all your medications and when to take them. Check marks below indicate your daily home schedule. Keep this list as a reference.      Medications           Morning Afternoon Evening Bedtime As Needed    abacavir 300 MG tablet   Commonly known as:  ZIAGEN   Take 600 mg by mouth every evening   Last time this was given:  600 mg on 1/21/2017  8:28 PM                    8pm               amLODIPine 5 MG tablet   Commonly known as:  NORVASC   Take 1 tablet (5 mg) by mouth At Bedtime   Last time this was given:  5 mg on 1/6/2017  9:06 PM                        10pm           ANUCORT-HC 25 MG Suppository   Place 25 mg rectally 2 times daily as needed   Generic drug:  hydrocortisone            Resume at home  8am           8pm               ASPIRIN PO   Take 81 mg by mouth every evening   Last time this was given:  81 mg on 1/21/2017  8:23 PM                    8am               atorvastatin 40 MG tablet   Commonly known as:  LIPITOR   Take 40 mg by mouth At Bedtime   Last time this was given:  10 mg on 1/21/2017  9:37 PM                        10 pm           calcium acetate 667 MG Caps capsule   Commonly known as:  PHOSLO   2,001 mg 3 times daily (with meals) Take 3 capsules three times a day with meals   Last time this was given:  2,001 mg on 1/22/2017 12:25 PM            wiith bkft       With lunch       with supper               carvedilol 12.5 MG tablet   Commonly known as:  COREG   Take 1 tablet (12.5 mg) by mouth 2 times daily (with meals)   Last time this was given:  25 mg on 1/22/2017 12:26 PM            With  bkft           With supper               chlorhexidine 0.12 % solution   Commonly known as:  PERIDEX   Rinse and gargle 15 ml by mouth twice a day as directed.   Last time this was given:  15 mLs on 1/22/2017  12:29 PM            8 am        8pm               CLONAZEPAM PO   Take 0.5 mg by mouth nightly as needed for anxiety (restless legs)   Last time this was given:  0.5 mg on 1/16/2017 12:04 AM                            Nightly as needed       CLOPIDOGREL BISULFATE PO   Take 75 mg by mouth every evening   Last time this was given:  75 mg on 1/21/2017  8:23 PM                    8pm               dapsone 100 MG tablet   Take 100 mg by mouth At Bedtime   Last time this was given:  100 mg on 1/21/2017  9:37 PM                        10 pm           DIALYVITE 800/ULTRA D Tabs   Take 1 tablet by mouth daily            Resume at home                        dolutegravir 50 MG tablet   Commonly known as:  TIVICAY   Take 50 mg by mouth At Bedtime   Last time this was given:  50 mg on 1/21/2017  8:28 PM                        10 pm           DOXERCALCIFEROL IV   Inject 6 mcg into the vein three times a week                                epoetin brittni 43621 UNIT/ML injection   Commonly known as:  EPOGEN/PROCRIT   Inject 11,000 Units Subcutaneous three times a week WITH DIALYSIS   Last time this was given:  10,000 Units on 1/22/2017  7:58 AM                                * gabapentin 300 MG capsule   Commonly known as:  NEURONTIN   Take 300 mg by mouth every morning   Last time this was given:  300 mg on 1/22/2017 12:26 PM            8am                       * gabapentin 300 MG capsule   Commonly known as:  NEURONTIN   Take 600 mg by mouth every evening   Last time this was given:  300 mg on 1/22/2017 12:26 PM                    8pm               * HumaLOG KWIKpen 100 UNIT/ML injection   Inject 15 Units Subcutaneous 3 times daily (before meals)   Generic drug:  insulin lispro            reaume at home/  bkft       Resume at home / with lunch       Resume at home/ with supper               * HumaLOG KWIKpen 100 UNIT/ML injection   Inject Subcutaneous 3 times daily (before meals) Sliding scale - 2 units per 50 over 150   Generic  "drug:  insulin lispro                                hydrALAZINE 50 MG tablet   Commonly known as:  APRESOLINE   Take 0.5 tablets (25 mg) by mouth 3 times daily   Last time this was given:  25 mg on 1/6/2017  9:06 PM            8am  Resume at home       Resume at home 2pm       Resume at home 8pm               imiquimod 5 % cream   Commonly known as:  ALDARA   Apply topically as needed                                   insulin glargine 100 UNIT/ML injection   Commonly known as:  LANTUS   Inject 50 Units Subcutaneous every morning   Last time this was given:  15 Units on 1/12/2017  8:56 AM            8am                       iron sucrose 20 MG/ML injection   Commonly known as:  VENOFER   Inject 50 mg into the vein once a week WIth dialysis                                isosorbide mononitrate 30 MG 24 hr tablet   Commonly known as:  IMDUR   Take 2 tablets (60 mg) by mouth every evening   Last time this was given:  60 mg on 1/6/2017  9:23 PM                    8 pm               lamISIL AT 1 % cream   Apply topically 2 times daily as needed   Last time this was given:  1/3/2017 10:08 PM   Generic drug:  terbinafine                                   losartan 100 MG tablet   Commonly known as:  COZAAR   Take 1 tablet (100 mg) by mouth At Bedtime   Last time this was given:  100 mg on 1/21/2017  9:37 PM                        10 pm           MAGNESIUM OXIDE PO   Take 400 mg by mouth At Bedtime   Last time this was given:  400 mg on 1/21/2017  9:37 PM                        10 pm           mycophenolate 500 MG tablet   Commonly known as:  CELLCEPT - GENERIC EQUIVALENT   Take 500 mg by mouth 2 times daily On an empty stomach            On empty stomach           On empty stomach               nitroglycerin 0.4 MG sublingual tablet   Commonly known as:  NITROSTAT   One tablet under the tongue every 5 minutes if needed for chest pain. May repeat every 5 minutes for a maximum of 3 doses in 15 minutes\"                         "        nystatin cream   Commonly known as:  MYCOSTATIN   Apply topically daily as needed for dry skin                                OMEGA-3 FISH OIL PO   Take 2 g by mouth 2 times daily (with meals)            8 am           6am               order for DME   Equipment being ordered: Other: 4WW Treatment Diagnosis: Decreased activity tolerance                                PREZISTA PO   Take 800 mg by mouth At Bedtime.   Last time this was given:  800 mg on 1/21/2017  8:23 PM                        10pm           ritonavir 100 MG capsule   Commonly known as:  NORVIR   Take 1 capsule by mouth At Bedtime                        10 pm           * Notice:  This list has 4 medication(s) that are the same as other medications prescribed for you. Read the directions carefully, and ask your doctor or other care provider to review them with you.

## 2017-01-03 NOTE — ED PROVIDER NOTES
History     Chief Complaint:  Chest Pain    History is limited secondary to the patient being a poor historian and due to his lack of cooperation.   KELSEY Raza is a 66 year old male with a complex medical history, significant for multiple MI and CABG, diabetes, ESRD on dialysis and HIV, who presents to the emergency department today for evaluation of chest pain and shortness of breath. The patient reports that yesterday around 1600 he was experiencing some chest pain which was constant in nature and has persisted since onset. He describes this as sharp and notes that he has had similar pains in the past. He rates his chest pain at 2/10 at this time. He notes no exacerbating symptoms. Furthermore, he notes that taking in a deep breath makes his chest pain slightly worse. This morning, the patient reports feeling short of breath and called 911. EMS reports the patient was at 94% O2 saturations on room air. They started him on 4 liters of O2 and his sats quickly improved. EMS also notes that he had a blood sugar of 175 and a blood pressure of 163/90. He was given 324 mg of Aspirin and a Nitro sublingually which reportedly improved some of his chest pain. He did complete dialysis today as well.     Per Chart Review, the patient has an extensive cardiac history including a large LAD lesion with two stents in 2015, had another MI in 5/2016 and underwent balloon angioplasty. Subsequently, the patient had another NSTEMI in August of this year and had restenosis of a stent and replacement.     Allergies:  Contrast Dye  Lisinopril  Sulfa Drugs       Medications:     Doxercalciferol  Aspirin  Clonazepam  Clopidogrel bisulfate  Norvasc  Imdur  Phoslo  Chlorhexidine  Lamisil  Cozaar  Lipitor  Neurontin  Nitroglycerin    Norvir    Prezista    Past Medical History:     Type 2 diabetes  HIV  Allergic rhinitis  Impotence of organic origin  Huang disease  Hypertension  CKD  Hyperlipidemia  CAD  NSTEMI  Pulmonary  "hypertension  TIA  ACS  Unstable angina  Renal insufficiency    Past Surgical History:     Cholecystectomy  Colostomy  Appendectomy  Angiogram x2  Coronary stents x2  VANITA=Diag, PTCA=LAD, VANITA=LAD    Family History:     Heart disease  Kidney disease  Hypertension  Dilated aorta    Social History:  Smoking status: Former smoker  Alcohol use: No   Marital Status:       Review of Systems   Reason unable to perform ROS: Poor historian.     Physical Exam   Vitals:  Patient Vitals for the past 24 hrs:   BP Temp Temp src Pulse Heart Rate Resp SpO2 Height Weight   01/03/17 1435 151/77 mmHg - - - 80 18 95 % - -   01/03/17 1430 - - - - 79 27 94 % - -   01/03/17 1420 153/79 mmHg - - - 81 19 93 % - -   01/03/17 1415 - - - - 79 19 95 % - -   01/03/17 1405 155/76 mmHg - - - 81 19 95 % - -   01/03/17 1400 - - - - 82 19 95 % - -   01/03/17 1350 156/75 mmHg - - - 83 20 97 % - -   01/03/17 1345 - - - - 82 20 97 % - -   01/03/17 1335 162/83 mmHg - - - 83 18 97 % - -   01/03/17 1300 - - - - 86 20 95 % - -   01/03/17 1245 - - - - 85 26 92 % - -   01/03/17 1244 167/78 mmHg 99.2  F (37.3  C) Oral - 86 18 94 % 1.803 m (5' 11\") 86.183 kg (190 lb)   01/03/17 1233 - - Oral 89 - 24 94 % - -          Physical Exam  General/Appearance: appears stated age, well-groomed, appears comfortable, frequently closes eyes and doesn't answer questions, often answers questions with non-sensical answers and then laughs, appears fatigued  Eyes: EOMI, no scleral injection, no icterus  ENT: MMM  Neck: supple, nl ROM, no stiffness  Cardiovascular: RRR, nl S1S2, no m/r/g, 2+ pulses in all 4 extremities, cap refill <2sec, large fistula to LUE with visible bruit/pulse and palpable thrill  Respiratory: CTAB, good air movement throughout, no wheezes/rhonchi/rales, no increased WOB, no retractions  GI: abd soft, non-distended, nttp,  no HSM, no rebound, no guarding, nl BS  MSK: VALENTIN, good tone, no bony abnormality  Skin: warm and well-perfused, no rash, 1+ BLE " edema, no ecchymosis, nl turgor  Neuro: GCS 15, alert and oriented, no gross focal neuro deficits  Heme: no petechia, no purpura, no active bleeding     Emergency Department Course     ECG:  ECG taken at 1237, ECG read at 1248  Normal sinus rhythm  Normal ECG  Rate 86 bpm. WV interval 164. QRS duration 96. QT/QTc 388/464. P-R-T axes 33 14 42.    Imaging:  Radiology findings were communicated with the patient who voiced understanding of the findings.    Chest x-ray:  Cardiomegaly and vascular congestion.  Reading per radiology    Laboratory:  Laboratory findings were communicated with the patient who voiced understanding of the findings.    Troponin (Collected 1245): <0.015  CBC: HGB: 7.5 (L), PLT: 139 (L) o/w WNL. (WBC 9.0)   BMP: Potassium: 3.3 (L), Chloride: 93 (L), Glucose: 164 (H), GFR: 14 (L) (Creatinine 4.37 (H))  ISTAT gases lactate augusto POCT: pH: 7.47 (H), PCO2: 42, PO2: 29, Bicarbonate: 30 (H), O2 sats: 59,  Lactic acid: 1.6    Emergency Department Course:  Nursing notes and vitals reviewed.  I performed an exam of the patient as documented above.   IV was inserted and blood was drawn for laboratory testing, results above.  At 1406 the patient was rechecked and was updated on the results of his laboratory and imaging studies.   I discussed the treatment plan with the patient. They expressed understanding of this plan and consented to admission. I discussed the patient with the Hospitalist, who will admit the patient to a monitored bed for further evaluation and treatment.  I personally reviewed the laboratory and imaging results with the patient and answered all related questions prior to admission.    Impression & Plan      Medical Decision Making:  Donald Raza is a 66 year old male who presents to the emergency department today for evaluation after dialysis with complaints of shortness of breath, some non-specific chest pain, as well as increasing weakness. Of note, he was fairly uncooperative with the  history to the point where he even denied any cardiac history. Chart Review shows this is clearly not accurate as he has had multiple NSTEMIs and multiple stenoses and stents over the past two years. Part of this makes me not fully trust his history and physical, especially because he was unwilling to answer multiple questions regarding his shortness of breath and chest pain that would help me further increase or decrease my concern for ACS. Given his significant history I do feel that I owe him due diligence to bring him in and do a cardiac evaluation. Of note, his initial EKG was unremarkable and initial labs, including troponin were unremarkable. I do feel he does belong at least in the hospital overnight for further troponin evaluation.     Diagnosis:    ICD-10-CM    1. Chest pain, unspecified type R07.9    2. Generalized muscle weakness M62.81    3. SOB (shortness of breath) R06.02    4. ESRD (end stage renal disease) on dialysis (H) N18.6     Z99.2    5. CAD S/P percutaneous coronary angioplasty I25.10     Z98.61    6. Human immunodeficiency virus (HIV) disease (HCC) B20        Scribe Disclosure:  I, Ace Brower, am serving as a scribe at 12:42 PM on 1/3/2017 to document services personally performed by Leilani Stratton MD, based on my observations and the provider's statements to me.    1/3/2017   [facility] EMERGENCY DEPARTMENT        Leilani Stratton MD  01/03/17 1523

## 2017-01-03 NOTE — Clinical Note
Admitting Physician: DAVID SARAVIA [[id number]]   [id number]   Bed Type: Cardiac Telemetry [44]   Special needs: Fall Risk [8]

## 2017-01-03 NOTE — IP AVS SNAPSHOT
[place] [address] [facility]    [address]    [facility] [address]    Phone:  [phone]    Fax:  [phone]                                       After Visit Summary   1/3/2017    Donald Raza    MRN: [id number]           After Visit Summary Signature Page     I have received my discharge instructions, and my questions have been answered. I have discussed any challenges I see with this plan with the nurse or doctor.    ..........................................................................................................................................  Patient/Patient Representative Signature      ..........................................................................................................................................  Patient Representative Print Name and Relationship to Patient    ..................................................               ................................................  Date                                            Time    ..........................................................................................................................................  Reviewed by Signature/Title    ...................................................              ..............................................  Date                                                            Time

## 2017-01-04 ENCOUNTER — APPOINTMENT (OUTPATIENT)
Dept: CARDIOLOGY | Facility: CLINIC | Age: 67
DRG: 286 | End: 2017-01-04
Attending: INTERNAL MEDICINE
Payer: MEDICARE

## 2017-01-04 LAB
GLUCOSE BLDC GLUCOMTR-MCNC: 104 MG/DL (ref 70–99)
GLUCOSE BLDC GLUCOMTR-MCNC: 194 MG/DL (ref 70–99)
GLUCOSE BLDC GLUCOMTR-MCNC: 200 MG/DL (ref 70–99)
GLUCOSE BLDC GLUCOMTR-MCNC: 227 MG/DL (ref 70–99)
GLUCOSE BLDC GLUCOMTR-MCNC: 229 MG/DL (ref 70–99)
GLUCOSE BLDC GLUCOMTR-MCNC: 234 MG/DL (ref 70–99)
GLUCOSE BLDC GLUCOMTR-MCNC: 86 MG/DL (ref 70–99)
HBA1C MFR BLD: 6 % (ref 4.3–6)
MAGNESIUM SERPL-MCNC: 2.3 MG/DL (ref 1.6–2.3)
POTASSIUM SERPL-SCNC: 3.7 MMOL/L (ref 3.4–5.3)

## 2017-01-04 PROCEDURE — 99152 MOD SED SAME PHYS/QHP 5/>YRS: CPT

## 2017-01-04 PROCEDURE — A9270 NON-COVERED ITEM OR SERVICE: HCPCS | Mod: GY | Performed by: INTERNAL MEDICINE

## 2017-01-04 PROCEDURE — 84132 ASSAY OF SERUM POTASSIUM: CPT | Performed by: INTERNAL MEDICINE

## 2017-01-04 PROCEDURE — 83735 ASSAY OF MAGNESIUM: CPT | Performed by: INTERNAL MEDICINE

## 2017-01-04 PROCEDURE — 93454 CORONARY ARTERY ANGIO S&I: CPT | Mod: 26 | Performed by: INTERNAL MEDICINE

## 2017-01-04 PROCEDURE — 96372 THER/PROPH/DIAG INJ SC/IM: CPT

## 2017-01-04 PROCEDURE — C1769 GUIDE WIRE: HCPCS

## 2017-01-04 PROCEDURE — 25000132 ZZH RX MED GY IP 250 OP 250 PS 637: Mod: GY | Performed by: INTERNAL MEDICINE

## 2017-01-04 PROCEDURE — 99232 SBSQ HOSP IP/OBS MODERATE 35: CPT | Performed by: INTERNAL MEDICINE

## 2017-01-04 PROCEDURE — B2111ZZ FLUOROSCOPY OF MULTIPLE CORONARY ARTERIES USING LOW OSMOLAR CONTRAST: ICD-10-PCS | Performed by: INTERNAL MEDICINE

## 2017-01-04 PROCEDURE — 25000125 ZZHC RX 250

## 2017-01-04 PROCEDURE — 36415 COLL VENOUS BLD VENIPUNCTURE: CPT | Performed by: INTERNAL MEDICINE

## 2017-01-04 PROCEDURE — 93454 CORONARY ARTERY ANGIO S&I: CPT

## 2017-01-04 PROCEDURE — 00000146 ZZHCL STATISTIC GLUCOSE BY METER IP

## 2017-01-04 PROCEDURE — 99152 MOD SED SAME PHYS/QHP 5/>YRS: CPT | Performed by: INTERNAL MEDICINE

## 2017-01-04 PROCEDURE — G0378 HOSPITAL OBSERVATION PER HR: HCPCS

## 2017-01-04 PROCEDURE — 27210946 ZZH KIT HC TOTES DISP CR8

## 2017-01-04 PROCEDURE — 25000128 H RX IP 250 OP 636: Performed by: INTERNAL MEDICINE

## 2017-01-04 PROCEDURE — 27210742 ZZH CATH CR1

## 2017-01-04 PROCEDURE — 27210827 ZZH KIT ACIST INJECTOR CR6

## 2017-01-04 PROCEDURE — 99223 1ST HOSP IP/OBS HIGH 75: CPT | Mod: 25 | Performed by: INTERNAL MEDICINE

## 2017-01-04 RX ORDER — NALOXONE HYDROCHLORIDE 0.4 MG/ML
0.4 INJECTION, SOLUTION INTRAMUSCULAR; INTRAVENOUS; SUBCUTANEOUS EVERY 5 MIN PRN
Status: DISCONTINUED | OUTPATIENT
Start: 2017-01-04 | End: 2017-01-04 | Stop reason: HOSPADM

## 2017-01-04 RX ORDER — NITROGLYCERIN 20 MG/100ML
.07-2 INJECTION INTRAVENOUS CONTINUOUS PRN
Status: DISCONTINUED | OUTPATIENT
Start: 2017-01-04 | End: 2017-01-04 | Stop reason: HOSPADM

## 2017-01-04 RX ORDER — DEXTROSE MONOHYDRATE 25 G/50ML
12.5-5 INJECTION, SOLUTION INTRAVENOUS EVERY 30 MIN PRN
Status: DISCONTINUED | OUTPATIENT
Start: 2017-01-04 | End: 2017-01-04 | Stop reason: HOSPADM

## 2017-01-04 RX ORDER — NITROGLYCERIN 5 MG/ML
100-200 VIAL (ML) INTRAVENOUS
Status: DISCONTINUED | OUTPATIENT
Start: 2017-01-04 | End: 2017-01-04 | Stop reason: HOSPADM

## 2017-01-04 RX ORDER — NALOXONE HYDROCHLORIDE 0.4 MG/ML
.1-.4 INJECTION, SOLUTION INTRAMUSCULAR; INTRAVENOUS; SUBCUTANEOUS
Status: DISCONTINUED | OUTPATIENT
Start: 2017-01-04 | End: 2017-01-04

## 2017-01-04 RX ORDER — LIDOCAINE HYDROCHLORIDE 10 MG/ML
1-10 INJECTION, SOLUTION EPIDURAL; INFILTRATION; INTRACAUDAL; PERINEURAL
Status: DISCONTINUED | OUTPATIENT
Start: 2017-01-04 | End: 2017-01-04 | Stop reason: HOSPADM

## 2017-01-04 RX ORDER — PROTAMINE SULFATE 10 MG/ML
1-5 INJECTION, SOLUTION INTRAVENOUS
Status: DISCONTINUED | OUTPATIENT
Start: 2017-01-04 | End: 2017-01-04 | Stop reason: HOSPADM

## 2017-01-04 RX ORDER — CLOPIDOGREL BISULFATE 75 MG/1
300-600 TABLET ORAL
Status: DISCONTINUED | OUTPATIENT
Start: 2017-01-04 | End: 2017-01-04 | Stop reason: HOSPADM

## 2017-01-04 RX ORDER — PHENYLEPHRINE HCL IN 0.9% NACL 1 MG/10 ML
20-100 SYRINGE (ML) INTRAVENOUS
Status: DISCONTINUED | OUTPATIENT
Start: 2017-01-04 | End: 2017-01-04 | Stop reason: HOSPADM

## 2017-01-04 RX ORDER — NITROGLYCERIN 5 MG/ML
100-500 VIAL (ML) INTRAVENOUS
Status: DISCONTINUED | OUTPATIENT
Start: 2017-01-04 | End: 2017-01-04 | Stop reason: HOSPADM

## 2017-01-04 RX ORDER — LIDOCAINE 40 MG/G
CREAM TOPICAL
Status: DISCONTINUED | OUTPATIENT
Start: 2017-01-04 | End: 2017-01-04 | Stop reason: HOSPADM

## 2017-01-04 RX ORDER — HEPARIN SODIUM 1000 [USP'U]/ML
INJECTION, SOLUTION INTRAVENOUS; SUBCUTANEOUS
Status: DISCONTINUED
Start: 2017-01-04 | End: 2017-01-04 | Stop reason: HOSPADM

## 2017-01-04 RX ORDER — LORAZEPAM 2 MG/ML
0.5 INJECTION INTRAMUSCULAR
Status: DISCONTINUED | OUTPATIENT
Start: 2017-01-04 | End: 2017-01-04 | Stop reason: HOSPADM

## 2017-01-04 RX ORDER — NITROGLYCERIN 5 MG/ML
VIAL (ML) INTRAVENOUS
Status: DISCONTINUED
Start: 2017-01-04 | End: 2017-01-04 | Stop reason: HOSPADM

## 2017-01-04 RX ORDER — HEPARIN SODIUM 1000 [USP'U]/ML
1000-10000 INJECTION, SOLUTION INTRAVENOUS; SUBCUTANEOUS EVERY 5 MIN PRN
Status: DISCONTINUED | OUTPATIENT
Start: 2017-01-04 | End: 2017-01-04 | Stop reason: HOSPADM

## 2017-01-04 RX ORDER — POTASSIUM CHLORIDE 29.8 MG/ML
20 INJECTION INTRAVENOUS
Status: DISCONTINUED | OUTPATIENT
Start: 2017-01-04 | End: 2017-01-04 | Stop reason: HOSPADM

## 2017-01-04 RX ORDER — LIDOCAINE 40 MG/G
CREAM TOPICAL
Status: DISCONTINUED | OUTPATIENT
Start: 2017-01-04 | End: 2017-01-22 | Stop reason: HOSPADM

## 2017-01-04 RX ORDER — ACETAMINOPHEN 325 MG/1
325-650 TABLET ORAL EVERY 4 HOURS PRN
Status: DISCONTINUED | OUTPATIENT
Start: 2017-01-04 | End: 2017-01-07

## 2017-01-04 RX ORDER — VERAPAMIL HYDROCHLORIDE 2.5 MG/ML
1-2.5 INJECTION, SOLUTION INTRAVENOUS
Status: DISCONTINUED | OUTPATIENT
Start: 2017-01-04 | End: 2017-01-04 | Stop reason: HOSPADM

## 2017-01-04 RX ORDER — LORAZEPAM 0.5 MG/1
0.5 TABLET ORAL
Status: DISCONTINUED | OUTPATIENT
Start: 2017-01-04 | End: 2017-01-04 | Stop reason: HOSPADM

## 2017-01-04 RX ORDER — ADENOSINE 3 MG/ML
12-12000 INJECTION, SOLUTION INTRAVENOUS
Status: DISCONTINUED | OUTPATIENT
Start: 2017-01-04 | End: 2017-01-04 | Stop reason: HOSPADM

## 2017-01-04 RX ORDER — LORAZEPAM 2 MG/ML
.5-2 INJECTION INTRAMUSCULAR EVERY 4 HOURS PRN
Status: DISCONTINUED | OUTPATIENT
Start: 2017-01-04 | End: 2017-01-04 | Stop reason: HOSPADM

## 2017-01-04 RX ORDER — DIPHENHYDRAMINE HYDROCHLORIDE 50 MG/ML
25-50 INJECTION INTRAMUSCULAR; INTRAVENOUS
Status: DISCONTINUED | OUTPATIENT
Start: 2017-01-04 | End: 2017-01-04 | Stop reason: HOSPADM

## 2017-01-04 RX ORDER — NITROGLYCERIN 0.4 MG/1
0.4 TABLET SUBLINGUAL EVERY 5 MIN PRN
Status: DISCONTINUED | OUTPATIENT
Start: 2017-01-04 | End: 2017-01-04 | Stop reason: HOSPADM

## 2017-01-04 RX ORDER — HYDROCODONE BITARTRATE AND ACETAMINOPHEN 5; 325 MG/1; MG/1
1-2 TABLET ORAL EVERY 4 HOURS PRN
Status: DISCONTINUED | OUTPATIENT
Start: 2017-01-04 | End: 2017-01-07

## 2017-01-04 RX ORDER — PROTAMINE SULFATE 10 MG/ML
25-100 INJECTION, SOLUTION INTRAVENOUS EVERY 5 MIN PRN
Status: DISCONTINUED | OUTPATIENT
Start: 2017-01-04 | End: 2017-01-04 | Stop reason: HOSPADM

## 2017-01-04 RX ORDER — FLUMAZENIL 0.1 MG/ML
0.2 INJECTION, SOLUTION INTRAVENOUS
Status: DISCONTINUED | OUTPATIENT
Start: 2017-01-04 | End: 2017-01-04 | Stop reason: HOSPADM

## 2017-01-04 RX ORDER — SODIUM CHLORIDE 9 MG/ML
INJECTION, SOLUTION INTRAVENOUS CONTINUOUS
Status: DISCONTINUED | OUTPATIENT
Start: 2017-01-04 | End: 2017-01-04 | Stop reason: HOSPADM

## 2017-01-04 RX ORDER — NIFEDIPINE 10 MG/1
10 CAPSULE ORAL
Status: DISCONTINUED | OUTPATIENT
Start: 2017-01-04 | End: 2017-01-04 | Stop reason: HOSPADM

## 2017-01-04 RX ORDER — SODIUM CHLORIDE 9 MG/ML
INJECTION, SOLUTION INTRAVENOUS CONTINUOUS
Status: DISCONTINUED | OUTPATIENT
Start: 2017-01-04 | End: 2017-01-07

## 2017-01-04 RX ORDER — POTASSIUM CHLORIDE 1500 MG/1
20 TABLET, EXTENDED RELEASE ORAL
Status: COMPLETED | OUTPATIENT
Start: 2017-01-04 | End: 2017-01-04

## 2017-01-04 RX ORDER — CLOPIDOGREL BISULFATE 75 MG/1
75 TABLET ORAL
Status: DISCONTINUED | OUTPATIENT
Start: 2017-01-04 | End: 2017-01-04 | Stop reason: HOSPADM

## 2017-01-04 RX ORDER — IOPAMIDOL 755 MG/ML
50 INJECTION, SOLUTION INTRAVASCULAR ONCE
Status: COMPLETED | OUTPATIENT
Start: 2017-01-04 | End: 2017-01-04

## 2017-01-04 RX ORDER — METHYLPREDNISOLONE SODIUM SUCCINATE 125 MG/2ML
125 INJECTION, POWDER, LYOPHILIZED, FOR SOLUTION INTRAMUSCULAR; INTRAVENOUS
Status: DISCONTINUED | OUTPATIENT
Start: 2017-01-04 | End: 2017-01-04 | Stop reason: HOSPADM

## 2017-01-04 RX ORDER — PROMETHAZINE HYDROCHLORIDE 25 MG/ML
6.25-25 INJECTION, SOLUTION INTRAMUSCULAR; INTRAVENOUS EVERY 4 HOURS PRN
Status: DISCONTINUED | OUTPATIENT
Start: 2017-01-04 | End: 2017-01-04 | Stop reason: HOSPADM

## 2017-01-04 RX ORDER — FUROSEMIDE 10 MG/ML
20-100 INJECTION INTRAMUSCULAR; INTRAVENOUS
Status: DISCONTINUED | OUTPATIENT
Start: 2017-01-04 | End: 2017-01-04 | Stop reason: HOSPADM

## 2017-01-04 RX ORDER — FENTANYL CITRATE 50 UG/ML
25-50 INJECTION, SOLUTION INTRAMUSCULAR; INTRAVENOUS
Status: ACTIVE | OUTPATIENT
Start: 2017-01-04 | End: 2017-01-05

## 2017-01-04 RX ORDER — FENTANYL CITRATE 50 UG/ML
INJECTION, SOLUTION INTRAMUSCULAR; INTRAVENOUS
Status: DISCONTINUED
Start: 2017-01-04 | End: 2017-01-04 | Stop reason: WASHOUT

## 2017-01-04 RX ORDER — EPTIFIBATIDE 2 MG/ML
1 INJECTION, SOLUTION INTRAVENOUS CONTINUOUS PRN
Status: DISCONTINUED | OUTPATIENT
Start: 2017-01-04 | End: 2017-01-04 | Stop reason: HOSPADM

## 2017-01-04 RX ORDER — FENTANYL CITRATE 50 UG/ML
INJECTION, SOLUTION INTRAMUSCULAR; INTRAVENOUS
Status: DISCONTINUED
Start: 2017-01-04 | End: 2017-01-04 | Stop reason: HOSPADM

## 2017-01-04 RX ORDER — BUPIVACAINE HYDROCHLORIDE 2.5 MG/ML
1-10 INJECTION, SOLUTION EPIDURAL; INFILTRATION; INTRACAUDAL
Status: DISCONTINUED | OUTPATIENT
Start: 2017-01-04 | End: 2017-01-04 | Stop reason: HOSPADM

## 2017-01-04 RX ORDER — ASPIRIN 325 MG
325 TABLET ORAL
Status: DISCONTINUED | OUTPATIENT
Start: 2017-01-04 | End: 2017-01-04 | Stop reason: HOSPADM

## 2017-01-04 RX ORDER — NALOXONE HYDROCHLORIDE 0.4 MG/ML
.2-.4 INJECTION, SOLUTION INTRAMUSCULAR; INTRAVENOUS; SUBCUTANEOUS
Status: ACTIVE | OUTPATIENT
Start: 2017-01-04 | End: 2017-01-05

## 2017-01-04 RX ORDER — PRASUGREL 10 MG/1
10-60 TABLET, FILM COATED ORAL
Status: DISCONTINUED | OUTPATIENT
Start: 2017-01-04 | End: 2017-01-04 | Stop reason: HOSPADM

## 2017-01-04 RX ORDER — FENTANYL CITRATE 50 UG/ML
25-50 INJECTION, SOLUTION INTRAMUSCULAR; INTRAVENOUS
Status: DISCONTINUED | OUTPATIENT
Start: 2017-01-04 | End: 2017-01-04 | Stop reason: HOSPADM

## 2017-01-04 RX ORDER — DOPAMINE HYDROCHLORIDE 160 MG/100ML
2-20 INJECTION, SOLUTION INTRAVENOUS CONTINUOUS PRN
Status: DISCONTINUED | OUTPATIENT
Start: 2017-01-04 | End: 2017-01-04 | Stop reason: HOSPADM

## 2017-01-04 RX ORDER — LIDOCAINE HYDROCHLORIDE 10 MG/ML
30 INJECTION, SOLUTION EPIDURAL; INFILTRATION; INTRACAUDAL; PERINEURAL
Status: DISCONTINUED | OUTPATIENT
Start: 2017-01-04 | End: 2017-01-04 | Stop reason: HOSPADM

## 2017-01-04 RX ORDER — HYDRALAZINE HYDROCHLORIDE 20 MG/ML
10-20 INJECTION INTRAMUSCULAR; INTRAVENOUS
Status: DISCONTINUED | OUTPATIENT
Start: 2017-01-04 | End: 2017-01-04 | Stop reason: HOSPADM

## 2017-01-04 RX ORDER — ONDANSETRON 2 MG/ML
4 INJECTION INTRAMUSCULAR; INTRAVENOUS EVERY 4 HOURS PRN
Status: DISCONTINUED | OUTPATIENT
Start: 2017-01-04 | End: 2017-01-04 | Stop reason: HOSPADM

## 2017-01-04 RX ORDER — MORPHINE SULFATE 2 MG/ML
1-2 INJECTION, SOLUTION INTRAMUSCULAR; INTRAVENOUS EVERY 5 MIN PRN
Status: DISCONTINUED | OUTPATIENT
Start: 2017-01-04 | End: 2017-01-04 | Stop reason: HOSPADM

## 2017-01-04 RX ORDER — ENALAPRILAT 1.25 MG/ML
1.25-2.5 INJECTION INTRAVENOUS
Status: DISCONTINUED | OUTPATIENT
Start: 2017-01-04 | End: 2017-01-04 | Stop reason: HOSPADM

## 2017-01-04 RX ORDER — FLUMAZENIL 0.1 MG/ML
0.2 INJECTION, SOLUTION INTRAVENOUS
Status: ACTIVE | OUTPATIENT
Start: 2017-01-04 | End: 2017-01-05

## 2017-01-04 RX ORDER — IOPAMIDOL 755 MG/ML
100 INJECTION, SOLUTION INTRAVASCULAR ONCE
Status: DISCONTINUED | OUTPATIENT
Start: 2017-01-04 | End: 2017-01-15

## 2017-01-04 RX ORDER — DOBUTAMINE HYDROCHLORIDE 200 MG/100ML
2-20 INJECTION INTRAVENOUS CONTINUOUS PRN
Status: DISCONTINUED | OUTPATIENT
Start: 2017-01-04 | End: 2017-01-04 | Stop reason: HOSPADM

## 2017-01-04 RX ORDER — ACETAMINOPHEN 325 MG/1
325-650 TABLET ORAL EVERY 4 HOURS PRN
Status: DISCONTINUED | OUTPATIENT
Start: 2017-01-04 | End: 2017-01-04

## 2017-01-04 RX ORDER — SODIUM NITROPRUSSIDE 25 MG/ML
100-200 INJECTION INTRAVENOUS
Status: DISCONTINUED | OUTPATIENT
Start: 2017-01-04 | End: 2017-01-04 | Stop reason: HOSPADM

## 2017-01-04 RX ORDER — ASPIRIN 81 MG/1
81-324 TABLET, CHEWABLE ORAL
Status: DISCONTINUED | OUTPATIENT
Start: 2017-01-04 | End: 2017-01-04 | Stop reason: HOSPADM

## 2017-01-04 RX ORDER — NICARDIPINE HYDROCHLORIDE 2.5 MG/ML
100 INJECTION INTRAVENOUS
Status: DISCONTINUED | OUTPATIENT
Start: 2017-01-04 | End: 2017-01-04 | Stop reason: HOSPADM

## 2017-01-04 RX ORDER — ASPIRIN 81 MG/1
81 TABLET ORAL DAILY
Status: DISCONTINUED | OUTPATIENT
Start: 2017-01-05 | End: 2017-01-04

## 2017-01-04 RX ORDER — EPTIFIBATIDE 2 MG/ML
180 INJECTION, SOLUTION INTRAVENOUS EVERY 10 MIN PRN
Status: DISCONTINUED | OUTPATIENT
Start: 2017-01-04 | End: 2017-01-04 | Stop reason: HOSPADM

## 2017-01-04 RX ORDER — POTASSIUM CHLORIDE 7.45 MG/ML
10 INJECTION INTRAVENOUS
Status: DISCONTINUED | OUTPATIENT
Start: 2017-01-04 | End: 2017-01-04 | Stop reason: HOSPADM

## 2017-01-04 RX ADMIN — CHLORHEXIDINE GLUCONATE 15 ML: 1.2 RINSE ORAL at 21:41

## 2017-01-04 RX ADMIN — LOSARTAN POTASSIUM 100 MG: 100 TABLET, FILM COATED ORAL at 21:41

## 2017-01-04 RX ADMIN — CALCIUM ACETATE 2001 MG: 667 CAPSULE ORAL at 19:00

## 2017-01-04 RX ADMIN — ISOSORBIDE MONONITRATE 60 MG: 30 TABLET, EXTENDED RELEASE ORAL at 21:41

## 2017-01-04 RX ADMIN — DAPSONE 100 MG: 25 TABLET ORAL at 21:41

## 2017-01-04 RX ADMIN — DARUNAVIR 800 MG: 800 TABLET, FILM COATED ORAL at 21:45

## 2017-01-04 RX ADMIN — INSULIN GLARGINE 50 UNITS: 100 INJECTION, SOLUTION SUBCUTANEOUS at 08:42

## 2017-01-04 RX ADMIN — MAGNESIUM OXIDE TAB 400 MG (241.3 MG ELEMENTAL MG) 400 MG: 400 (241.3 MG) TAB at 21:41

## 2017-01-04 RX ADMIN — ABACAVIR 600 MG: 300 TABLET ORAL at 20:13

## 2017-01-04 RX ADMIN — INSULIN ASPART 15 UNITS: 100 INJECTION, SOLUTION INTRAVENOUS; SUBCUTANEOUS at 20:59

## 2017-01-04 RX ADMIN — FENTANYL CITRATE 100 MCG: 50 INJECTION, SOLUTION INTRAMUSCULAR; INTRAVENOUS at 16:52

## 2017-01-04 RX ADMIN — CLOPIDOGREL 75 MG: 75 TABLET, FILM COATED ORAL at 21:40

## 2017-01-04 RX ADMIN — SODIUM CHLORIDE: 9 INJECTION, SOLUTION INTRAVENOUS at 11:47

## 2017-01-04 RX ADMIN — MYCOPHENOLATE MOFETIL 500 MG: 250 CAPSULE ORAL at 21:40

## 2017-01-04 RX ADMIN — RITONAVIR 100 MG: 100 TABLET, FILM COATED ORAL at 21:45

## 2017-01-04 RX ADMIN — MIDAZOLAM 1.5 MG: 1 INJECTION INTRAMUSCULAR; INTRAVENOUS at 16:52

## 2017-01-04 RX ADMIN — AMLODIPINE BESYLATE 5 MG: 5 TABLET ORAL at 21:45

## 2017-01-04 RX ADMIN — ATORVASTATIN CALCIUM 40 MG: 40 TABLET, FILM COATED ORAL at 21:45

## 2017-01-04 RX ADMIN — MYCOPHENOLATE MOFETIL 500 MG: 250 CAPSULE ORAL at 08:41

## 2017-01-04 RX ADMIN — CHLORHEXIDINE GLUCONATE 15 ML: 1.2 RINSE ORAL at 08:42

## 2017-01-04 RX ADMIN — HYDRALAZINE HYDROCHLORIDE 25 MG: 25 TABLET ORAL at 08:42

## 2017-01-04 RX ADMIN — CARVEDILOL 12.5 MG: 12.5 TABLET, FILM COATED ORAL at 19:00

## 2017-01-04 RX ADMIN — ASPIRIN 325 MG: 325 TABLET, DELAYED RELEASE ORAL at 10:29

## 2017-01-04 RX ADMIN — INSULIN ASPART 15 UNITS: 100 INJECTION, SOLUTION INTRAVENOUS; SUBCUTANEOUS at 09:10

## 2017-01-04 RX ADMIN — CARVEDILOL 12.5 MG: 12.5 TABLET, FILM COATED ORAL at 08:42

## 2017-01-04 RX ADMIN — GABAPENTIN 300 MG: 300 CAPSULE ORAL at 08:42

## 2017-01-04 RX ADMIN — POTASSIUM CHLORIDE 20 MEQ: 1500 TABLET, EXTENDED RELEASE ORAL at 11:45

## 2017-01-04 RX ADMIN — HYDRALAZINE HYDROCHLORIDE 25 MG: 25 TABLET ORAL at 19:00

## 2017-01-04 RX ADMIN — GABAPENTIN 600 MG: 300 CAPSULE ORAL at 20:13

## 2017-01-04 RX ADMIN — HYDRALAZINE HYDROCHLORIDE 25 MG: 25 TABLET ORAL at 21:40

## 2017-01-04 RX ADMIN — IOPAMIDOL 50 ML: 755 INJECTION, SOLUTION INTRAVASCULAR at 17:00

## 2017-01-04 RX ADMIN — CALCIUM ACETATE 2001 MG: 667 CAPSULE ORAL at 08:41

## 2017-01-04 NOTE — CONSULTS
CARDIOLOGY CONSULTATION      REFERRING PHYSICIAN:  Hospitalist Service.      INDICATION FOR CARDIAC CONSULTATION:  Unstable angina, chest pain.      HISTORY OF PRESENT ILLNESS:  It is my pleasure to see your patient, Donald Raza who is a pleasant 66-year-old gentleman with a past history of coronary artery disease, followed by my partner, Dr. Arnoldo Diaz in Cardiology.  This patient previously had a mid left anterior descending artery stent placed in 06/2015.  He had diagonal disease which was managed medically; however, in 12/2015 he presented with a non-Q-wave myocardial infarct and was found to have a tight ostial diagonal artery stenosis which was treated with a drug-eluting stent.  The LAD was also dilated in a kissing-balloon fashion.  In 05/2015 he had another non-ST elevation myocardial infarct and was found to have in-stent restenosis in the diagonal artery stent and he underwent cutting balloon angioplasty alone at that time.  The patient did well for approximately 3 months after that and then began to develop chest discomfort relieved by nitroglycerin, mainly brought on with exertion.  However, on the day of admission, he was at dialysis.  However, prior to dialysis he was complaining of some dizziness and lightheadedness but also discomfort in his chest, relieved by nitroglycerin.  He then developed chest discomfort and dizziness while on dialysis.  He was then transferred to the hospital.  On the way he was given a nitro which did relieve his chest discomfort.  He has ruled out for myocardial infarct with no rise in troponin.  All 3 measurements were undetectable.  He also complains of some back discomfort which can radiate towards his chest which is a confounding factor.      The patient did have a nuclear stress test performed in 12/2016 which is at about it 3-4 weeks ago which showed no evidence of ischemia.      PAST MEDICAL HISTORY:   1.  End-stage renal disease, on dialysis.   2.  Coronary  artery disease as described above with multiple stenting procedures.   3.  Type 2 diabetes mellitus.   4.  Human immunodeficiency virus.   5.  Hypertension.   6.  Chronic anemia.   7.  Pulmonary hypertension.   8.  Dyslipidemia.   9.  Cholecystectomy.   10.  Transient ischemic attack.   11.  Appendectomy.   12.  Prostate lesion which is being worked up.      MEDICATIONS:   1.  Ziagen 600 mg every evening.   2.  Amlodipine 5 mg at bedtime.   3.  Aspirin 81 mg per day.   4.  Atorvastatin 40 mg at bedtime.   5.  PhosLo 2001 mg 3 times daily.   6.  Carvedilol 12.5 mg twice a day.     7.  Chlorhexidine 15 mL swish and spit 2 times a day.   8.  Clopidogrel 75 mg per day.   9.  Dapsone 100 mg at bedtime.   10.  Darunavir 800 mg at bedtime.   11.  Dolutegravir 50 mg at bedtime.   12.  Gabapentin 300 mg every morning and 600 mg every evening.   13.  Hydralazine 25 mg 3 times a day.   14.  Insulin sliding scale.   15.  Insulin NovoLog rapid acting 15 units subcutaneously 3 times a day.   16.  Insulin Glargine 50 units subcutaneously every morning.   17.  Isosorbide mononitrate 60 mg per day.   18.  Losartan 100 mg per day.   19.  Magnesium oxide 400 mg at bedtime.   20.  CellCept 500 mg 2 times a day.     21.  Norvir 100 mg at bedtime.      ALLERGIES:  Lisinopril and sulfa drugs.      FAMILY HISTORY:  Brother has coronary disease and sister has chronic kidney disease.      SOCIAL HISTORY:  He moved here from [place], lives with his wife.  He stopped smoking several years ago.      REVIEW OF SYSTEMS:   CONSTITUTIONAL:  Negative.   EYES:  Negative.   ENT:  Negative.   CARDIOVASCULAR:  As above.   RESPIRATORY:  He did complain of some shortness of breath when on dialysis.   GASTROINTESTINAL:  Nil.   GENITOURINARY:  Nil.   MUSCULOSKELETAL:  Complains of back pain and cannot lie down or sit down for a long time.   NEUROLOGIC:  As above on gabapentin.   PSYCHIATRIC:  Nil.   ENDOCRINE:  Diabetes mellitus.   HEMATOLYMPHATIC:  Anemia.    ALLERGY/IMMUNOLOGY:  As above.      PHYSICAL EXAMINATION:   GENERAL:  He is a pleasant man.  He is in no apparent distress.   VITAL SIGNS:  His blood pressure is 129/59, pulse is 67 beats per minute, temperature is 99.2, respirations are 18.  Sats are 92%.   HEENT:  He is wearing spectacles.  Pupils are equal.  His jugular venous pulse is not raised.  His carotids are normal with no bruits.  Trachea is not deviated.   HEART:  [place] beat is impalpable.  Heart sounds 1 and 2 are normal with no murmurs.   CHEST:  Clear to percussion and auscultation with no added sounds.   ABDOMEN:  Apart from moderate truncal obesity is unremarkable with no hepatosplenomegaly and no masses or bruits.  His pedal pulses are 2+.   EXTREMITIES:  He has no peripheral edema.   SKIN:  No obvious skin lesions noted.   NEUROLOGIC:  Moves all 4 limbs appropriately with no obvious sensory or motor loss.  He is fully oriented to time, place and person with a very pleasant affect.      LABORATORY DATA:  Sodium is 133, potassium is 3.3, BUN is 27, creatinine is 4.37.  All troponins are less than 0.015.  White cell count 9.0, hemoglobin 7.5, platelet count is 139,000.  EKG does not show any ischemic changes.  The chest x-ray showed cardiomegaly and some vascular congestion.      IMPRESSION:     1.  The symptoms are very suggestive of unstable angina pectoris.  Certainly anemia could be playing a role.  His hemoglobin, however, runs chronically around the 8.97 zuleika, so the hemoglobin of 7.5 is not unusual for this gentleman with chronic renal failure and HIV.  It does appear that his symptoms started 3 months after his last intervention which is suggestive of restenosis.  Fortunately; however, he did not have a myocardial infarct this time and he has no evidence of ischemia on his nuclear stress test; however, the patient continues to have chest discomfort.   2.  Diabetes mellitus.   3.  Chronic renal insufficiency, on dialysis.   4.  Human  immunodeficiency virus.   5.  Chronic anemia.      PLAN:  Cardiac catheterization, I think is indicated at this stage in this patient has got ongoing chest discomfort suggestive of angina pectoris, but with a negative nuclear stress test.  I explained the risks and benefits of the procedure to the patient including the risk of stroke, heart attack, death, bleeding, bruising, hematoma formation, vascular damage, dye allergy, dye nephropathy, the risks associated with conscious sedation including aspiration, intubation, and the risks associated with blood transfusions.  I have also explained the special risks associated with coronary interventions including increased risk of death, myocardial infarction and emergency bypass surgery.  The patient told me he fully understood why we are doing the procedure, told me he fully understood the risks associated with the procedure, and finally he told me that he wished to proceed.  We will continue him on all his other present medications.  He is pain-free at present and is on clopidogrel, so I will not add intravenous heparin.  His cardiac enzymes were also negative.  It has been a pleasure to be involved in the care of this very nice patient.         MAE STALEY MD, [facility]             D: 2017 10:01   T: 2017 11:17   MT:       Name:     GREGORIO BRUSH   MRN:      [id number]        Account:       [id number]   :      1950           Consult Date:  2017      Document: [id number]

## 2017-01-04 NOTE — PROGRESS NOTES
"[facility]  Hospitalist Progress Note  Marquez Tolliver,  01/04/2017    Reason for Stay (Diagnosis): chest pain         Assessment and Plan:      Summary of Stay: Donald Raza is a 66 year old male admitted on 1/3/2017 with chest pain    Problem List:   1. Chest pain with known coronary artery disease.  Cardiology consult appreciated.  Plan for coronary angiogram.  Continue ASA, Lipitor, Coreg, Plavix, Imdur, Hydralazine, Cozaar.  2. End stage kidney failure.  Hemodialysis per Nephrology.  Continue Phoslo.  3. HIV.  Continue Ziagen, Prezista, Tivicay, and Norvir.  Dapsone.  4. HTN.  Continue Cozaar, Imdur, Norvasc, Coreg, Hydralazine.  5. Diabetes Mellitus.  Continue Lantus, Novolog.  6. Hypokalemia.  Correct with Dialysis.  Monitor.  DVT Prophylaxis: Ambulate every shift  Code Status: Full Code  Discharge Dispo: Home  Estimated Disch Date / # of Days until Disch: 1-2        Interval History (Subjective):      Having occasional chest pain.  Denies SOB, F/C, N/V, or diarrhea.                  Physical Exam:      Last Vital Signs:  /55 mmHg  Pulse 67  Temp(Src) 98.7  F (37.1  C) (Oral)  Resp 18  Ht 1.803 m (5' 11\")  Wt 89.041 kg (196 lb 4.8 oz)  BMI 27.39 kg/m2  SpO2 94%     Gen:  NAD, A&Ox3.  Eyes:  PERRL, sclera anicteric.  OP:  MMM, no lesions.  Neck:  Supple.  CV:  Regular, no murmurs.  Lung:  CTA b/l, normal effort.  Ab:  +BS, soft.  Skin:  Warm, dry to touch.  No rash.  Ext:  No pitting edema LE b/l.           Medications:      All current medications were reviewed with changes reflected in problem list.         Data:      All new lab and imaging data was reviewed.   Labs:    Recent Labs  Lab 01/04/17  1110 01/03/17  1245   NA  --  133   POTASSIUM 3.7 3.3*   CHLORIDE  --  93*   CO2  --  30   ANIONGAP  --  10   GLC  --  164*   BUN  --  27   CR  --  4.37*   GFRESTIMATED  --  14*   GFRESTBLACK  --  16*   PRABHA  --  9.0       Recent Labs  Lab 01/03/17  1245   WBC 9.0   HGB 7.5*   HCT " 23.0*   MCV 88   *      Imaging:   No results found for this or any previous visit (from the past 24 hour(s)).

## 2017-01-04 NOTE — PLAN OF CARE
Problem: Goal Outcome Summary  Goal: Goal Outcome Summary  Outcome: No Change  Temp: 99.2  F (37.3  C) Temp src: Oral BP: 129/59 mmHg  Heart Rate: 66 Resp: 18 SpO2: 92 % O2 Device: None (Room air)      VSS, except for slight temp elevation. Pt RA with sats in the low 90s. Has pain in left rib cage area superior to breast. Denies chest pain. Complains of slight SOB. Up with SBA. Bed alarm on. AV fistula in L upper arm intact. A&O to self, place, and time, disoriented to situation. Antiviral meds Abacavir & Dolutegravir unavailable at our pharmacy so pt did not receive evenings dose. Family will bring today. Has dialysis T, Th, Sat. Tele is SR.

## 2017-01-04 NOTE — PLAN OF CARE
Problem: Cardiac: Acute Coronary Syndrome (ACS) (Adult)  Goal: Signs and Symptoms of Listed Potential Problems Will be Absent or Manageable (Cardiac: Acute Coronary Syndrome)  Signs and symptoms of listed potential problems will be absent or manageable by discharge/transition of care (reference Cardiac: Acute Coronary Syndrome (ACS) (Adult) CPG).   Outcome: No Change  Pt alert and oriented X 3, VS stable, pt reports mid-sternal, left flank, and low back pain 6/10. See chart for paper tele strip readings.    Lung sounds clear, BS active, pt reports passing flatus and voiding. Pt reports BLE numbness, denies tingling. Bruits and thrill present in Left arm fistula. Current dialysis Tuesday, Thursday, Saturday. Pt transferred to Cath lab for angio. K+ 3.3, replaced on days, redraw 3.7, Mag, 2.3, Creatinine 4.37 (1/3/2016).      2130- pt returned from angio at 1725, VS stable, angio site WDL. Pt BG 86, insulin held. 2100- Pt , pt requested the 15 unit mealtime insulin, administered. Pt will continue to be assessed

## 2017-01-04 NOTE — H&P
CHIEF COMPLAINT:  Chest pain.      HISTORY OF PRESENT ILLNESS:  Mr. Donald Raza is a 66-year-old man with a complex past medical history including end-stage renal disease, coronary artery disease, diabetes and HIV who presents with chest pain.  Precise details are somewhat difficult to obtain.  He describes some element of chest discomfort last night in the evening but then also reports that he developed pain for the first time at 1:30 this morning.  He apparently fell asleep and he also said that the pain started when he woke up this morning.  He says that the pain has been steady but also says that the pain is now resolved.  He went to hemodialysis today and apparently completed the run but was having some discomfort during the run.  He initially said the pain was in the middle of his chest but then also said it was spread throughout his chest.  It seems from previous records that he has chronic angina which usually is manifested in the upper back.  He also describes some difficulty with breathing earlier today.      PAST MEDICAL HISTORY:   1.  End-stage renal disease.  He is currently doing workup for renal transplant.   2.  Coronary artery disease.  He initially underwent angiogram for transplant workup in 06/2015; he was found to have significant coronary artery disease and had left anterior descending artery stent placed in 12/2015.  He had non-ST elevation myocardial infarction and had a diagonal branch stent placement and also had angioplasty to the LAD in 05/2016.  He had in-stent restenosis of the diagonal branch stent and had a cutting balloon procedure in 08/2016.  He was admitted for non-ST elevation myocardial infarction and again found to have abnormal stress test and in-stent restenosis of the diagonal stent, he had another stent placed in the diagonal artery.  On 12/05/2016 he had a nuclear stress test which showed no evidence of ischemia with the previous abnormalities seen in August had  completely resolved.   3.  Type 2 diabetes.   4.  HIV positive on antiviral therapy.   5.  Severe hypertension.   6.  Chronic anemia requiring occasional transfusions.   7.  Dyslipidemia.   8.  Pulmonary hypertension.   9.  Transient ischemic attack.   10.  Cholecystectomy.   11.  Appendectomy.   12.  Prostate lesion suspicious for cancer currently undergoing workup.      MEDICATIONS:   1.  Abacavir 600 mg a day.   2.  Amlodipine 5 mg a day.   3.  Aspirin 81 mg a day.   4.  Atorvastatin 40 mg a day.   5.  PhosLo 2001 mg 3 times a day with meals.   6.  Coreg 12.5 mg twice a day.   7.  Peridex Swish and spit twice a day.   8.  Clonazepam 0.5 mg at bedtime.   9.  Plavix 75 mg a day.   10.  Dapsone 100 mg a day.   11.  Darunavir 800 mg a day.   12.  Dialyvite multivitamin 1 tablet a day.   13.  Dolutegravir 50 mg a day.   14.  Neurontin 600 mg at bedtime.   15.  Hydralazine 25 mg 3 times a day.   16.  Anusol suppositories twice a day as needed.   17.  Lantus insulin 50 units subq every morning.   18.  Lispro insulin 15 units 3 times a day before meals plus sliding scale.   19.  Imdur 60 mg every evening.   20.  Cozaar 100 mg at bedtime.   21.  Magnesium oxide 400 mg at bedtime.   22.  CellCept 500 mg twice a day (this is apparently to minimize antibody production for potential renal transplant).   23.  Nitroglycerin as needed.   24.  Ritonavir 100 mg a day.   25.  Lamisil as needed.   26.  Doxercalciferol 16 mcg injected 3 times a week.   27.  Epogen 11,000 units 3 times a week with dialysis.   28.  Aldara cream as needed.   29.  Venofer 50 mcg once a week with dialysis.   30.  Mycostatin cream as needed.   31.  Fish oil 2 grams twice a day.      ALLERGIES:  Lisinopril and sulfa.      FAMILY HISTORY:  His brother had coronary artery disease.  His sister had chronic kidney disease.      SOCIAL HISTORY:  He moved here from [place] many years ago.  He lives with his wife.  He smoked in the past but quit several years ago.       REVIEW OF SYSTEMS:  Difficult to obtain due to variable answers; I attempted a 10-point review of systems, the pertinent positives can be seen above in history of present illness.        I discussed the case with Dr. Stratton from the Emergency Department; I have reviewed extensive medical records in [facility] and Care Everywhere.      PHYSICAL EXAMINATION:   VITAL SIGNS:  Blood pressure is 151/74, pulse 78, temp 99.2, respirations 21 and oxygen saturation 98% on room air.   GENERAL:  He is alert, pleasant and cooperative in no apparent distress.   HEENT:  Pupils are round and equal, conjunctiva, sclerae and lids are within normal limits.   Neck is supple with no masses, no thyromegaly.  I did not examined oropharynx, teeth, lips as he has mask on.   CARDIOVASCULAR:  Regular rhythm, no murmurs.   LUNGS:  Clear to auscultation bilaterally with normal respiratory effort, chest pain is not reproducible with palpation.   ABDOMEN:  Soft, nontender, there are no masses, bowel sounds are active.   EXTREMITIES:  There is trace bilateral lower extremity edema.   NEUROLOGIC:  Strength and sensation are intact in all 4 extremities; cranial nerves II through XII are grossly intact.   PSYCHIATRIC:  Variable answers, mood and affect appear appropriate.   SKIN:  No rashes are noted on limited exam.      LABS:  Sodium of 133, potassium 3.3, chloride 93, bicarbonate 30, BUN 27, creatinine 4.3, glucose 164, troponin less than 0.015, pH is 7.47, pCO2 of 42, pO2 of 29.  White blood cell count is 9, hemoglobin 7.5, platelets 139,000.  Chest x-ray shows some evidence of vascular congestion, no other infiltrates.  EKG shows normal sinus rhythm with no ischemic changes.      ASSESSMENT AND PLAN:  Mr. Raza is a 66-year-old man with a complex past medical history who presents with some chest discomfort and possible shortness of breath.  He has a history of coronary artery disease with previous stent placements and re-stenoses of the stents,  end-stage renal disease on hemodialysis, type 2 diabetes, HIV, hypertension, dyslipidemia and a concerning prostate lesion.   1.  Chest pain.  There is a wide differential diagnosis.  He certainly is at risk for recurrent coronary ischemia given his recent history over the past couple of years.  Initial studies are reassuring that he has no clear ischemic changes on EKG and his troponin is negative.  It is difficult to obtain a precise history.  We will continue to check serial troponins and he will be monitored on telemetry.  We will continue with aspirin, Plavix, Coreg, Cozaar and statin therapy which he has been on in the past.  I will also continue with Imdur.  We will request Cardiology consultation to determine if any further ischemic workup would be indicated if troponins are negative.  As mentioned above he had a nuclear stress test about a month ago which showed no evidence of ischemia.  He also has chronic anemia which could contribute to some anginal symptoms.     2.  Anemia.  It seems this has been difficult to manage, he is on IV iron and erythropoietin on hemodialysis and despite this his hemoglobin has remained low and it seems he has had transfusions periodically.  This also certainly could be contributing to his anginal symptoms.  We will defer to Nephrology if this needs further adjustment or if we should consider transfusion.   3.  End-stage renal disease.  He did complete hemodialysis today according to the ER staff.  We will request Nephrology consultation to assess further dialysis is needed beyond his scheduled days Tuesday, Thursday, Saturday.  Based on the records he has been undergoing evaluation for potential transplant.  As mentioned above it seems he is on CellCept to minimize antibody production in the anticipation of possible transplant in the future.  We will continue the CellCept for now.   4.  HIV (human immunodeficiency virus).  It seems that this has been well controlled as an  outpatient with reasonable CD4 count.  We will continue his outpatient antiviral medication regimen.   5.  Prostate lesion.  It seems that this has been initially evaluated but not biopsied because of the concern of him being off antiplatelet agents.  He can follow up with this as an outpatient.     6.  Hypertension.  This has been somewhat difficult to control in the past.  His blood pressure is currently within a reasonable range and we will resume his outpatient antihypertensive regimen.         DAVID SARAVIA MD             D: 2017 16:59   T: 2017 18:20   MT: [id number]#[id number]      Name:     GREGORIO BRUSH   MRN:      [id number]        Account:      [id number]   :      1950           Admitted:     [id number]      Document: [id number]       cc: [facility]

## 2017-01-04 NOTE — CONSULTS
I have examined the patient, reviewed the history, medications and pre procedural tests. Well known to me from multiple PCI procedures D1. He presents after PCI D1 with VANITA 12/2016 negative nuclear stress test ..  I have explained to the patient the risks of death, MI, stroke, hematoma, possible urgent bypass surgery for failed PCI, use of stents, thienopyridine agents, possible peripheral vascular complications, arrhythmia, the use of FFR in clinical decision-making and alternative of medical therapy alone in regards to coronary angiography, and possible percutaneous coronary intervention. The patient voiced understanding and wishes to proceed. Femoral approach due to ESRD on HD.

## 2017-01-04 NOTE — PROGRESS NOTES
No evidence of flow limiting on cardiac cath. Non cardiac CP. Will sign off. Please xcall if any cardiac issues. Thx

## 2017-01-04 NOTE — PROCEDURES
Procedure  1) CAG  Approach RFR 4 Fr  Complications none  Indication chest pain, CAD sp multiple PCIs, most recently repeat VANITA origin of D1, ESRD on HD    Findings  RCA dominant. Mild generalized atheromatous change. No focal stenosis. No change  LMCA normal  LAD proximal no significant stenosis. Mid segment stent just past origin of D1 mild in stent restenosis 20% no change. Distal LAD stent no restenosis. The D1 origin at site of previous stent procedures is widely patent with 20% in stent restenosis.  CX nondominant. Mild atheromatous change no focal stenosis. No change.    Assessment : All intervention sites widely patent with with only mild in stent restenosis and STEVE 3 flow.    Recommendation: Medical therapy. Dialysis. No indication for PCI .    [place]

## 2017-01-04 NOTE — PLAN OF CARE
"Problem: Goal Outcome Summary  Goal: Goal Outcome Summary  Outcome: No Change  Alert/oriented. Understands that he will have angiogram & will review written angio information but stated \"I know what it is, I've had 5-6 of them\". K+ 3.3 yesterday - reordered & will give add'l based on level today. SAGE fistula + bruit/thrill, radial +2. No chest pain but c/o low back discomfort 1/10.  Voided 300 ml yellow urine. IVF to be at renal rate TKO pre- cath. Breakfast at 0920 - so late angio planned.         "

## 2017-01-04 NOTE — PROGRESS NOTES
SPIRITUAL HEALTH SERVICES Progress Note  [facility] 348    Attempted to visit Donald per intake request. At each attempt, Donald was unavailable for a visit. [facility] will continue to follow up.    Stewart Grace  Chaplain Resident  Pager [phone]

## 2017-01-04 NOTE — PLAN OF CARE
Problem: Cardiac: Acute Coronary Syndrome (ACS) (Adult)  Goal: Signs and Symptoms of Listed Potential Problems Will be Absent or Manageable (Cardiac: Acute Coronary Syndrome)  Signs and symptoms of listed potential problems will be absent or manageable by discharge/transition of care (reference Cardiac: Acute Coronary Syndrome (ACS) (Adult) CPG).   Outcome: Improving  VSS, afebrile.  RA sats low-mid 90's.  Tele SR.  Trops neg x 3.  Denies pain.  Denies chest pain.  Continues to have SOB, especially FRANCO.  Lungs CTA B.  Up to BR with assist of 1, bed alarm on and pt calls appropriately.  Has Large AV fistula in L upper arm, had HD today.  Voiding good U.O., clear yellow.  A&O x4, forgetful at x's.  Pt meds:Abacavir & Dolutegravir unavailable at our pharmacy so pt did not receive this evenings dose, family will bring in tomorrow.   POC  Reviewed with pt, wife, brother & daughter, questions answered.

## 2017-01-04 NOTE — CONSULTS
RENAL CONSULTATION NOTE    REFERRING MD:  Jovi Tang MD    REASON FOR CONSULTATION:  ESRD    HPI:  66 y.o man with HIV, T2DM, CAD and ESRD, who was admitted on 1/3 with chest pain. Pt states he has had intermittent chest pain that is worse with exertion. He woke up in the middle of the night with shortness of breath. He was feeling dizzy prior to dialysis. He developed chest pain during dialysis. He was given one nitroglycerin, and the chest improved. He completed his dialysis treatment, and he was sent directly to the hospital for further evaluation. Cardiology had evaluated the patient. He is going to get a [facility] later today.     Currently, he denies SOB. He is still having some chest pain but not too bad.     ROS:  A complete review of systems was performed and is negative except as noted above.    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, recurrent     Hypertension 2010     CKD (chronic kidney disease)      Hemodialysis     Mixed hyperlipidemia      Coronary artery disease      stents     NSTEMI (non-ST elevated myocardial infarction) (H) 12/2015, 5/2016     Pulmonary HTN (H)      Mod     Near syncope 2016     with hemodialysis     TIA (transient ischemic attack) 5/2016     Increased prostate specific antigen (PSA) velocity 08/08/2016     Awaiting bx on blood thinner       PSH:    Past Surgical History   Procedure Laterality Date     Cholecystectomy, laporoscopic       Colostomy  09/30/1999     Temporary for diverticulitis     Appendectomy  2000     Angiogram  03-04-16     No culprit lesions, stents widely patent      Angiogram  05-06-16     Cutting balloon ptca=Diag     Stent, coronary, s660 15/18  12/2015     VANITA=Diag, PTCA=LAD     Stent, coronary, s660 15/18  06/2015     VANITA=LAD      Heart cath, angioplasty  08-18-16     LAD PCI. Stented with a 3.0 x 8 mm Xience Alpine stent.       MEDICATIONS:      - MEDICATION INSTRUCTIONS for Dialysis Patients -   Does not apply See Admin Instructions     sodium chloride (PF)  3 mL Intracatheter Q8H     aspirin EC  325 mg Oral Daily     heparin (porcine)         acetylcholine  20 mcg INTRACORONARY Once    Followed by     acetylcholine  50 mcg INTRACORONARY Once    Followed by     acetylcholine  50 mcg INTRACORONARY Once     acetylcholine  20 mcg INTRACORONARY Once    Followed by     acetylcholine  50 mcg INTRACORONARY Once     abacavir  600 mg Oral QPM     amLODIPine  5 mg Oral At Bedtime     aspirin chewable tablet 81 mg  81 mg Oral QPM     atorvastatin  40 mg Oral At Bedtime     calcium acetate  2,001 mg Oral TID w/meals     carvedilol  12.5 mg Oral BID w/meals     chlorhexidine  15 mL Swish & Spit BID     clopidogrel (PLAVIX) tablet 75 mg  75 mg Oral QPM     dapsone  100 mg Oral At Bedtime     darunavir  800 mg Oral At Bedtime     dolutegravir  50 mg Oral At Bedtime     gabapentin  300 mg Oral QAM     gabapentin  600 mg Oral QPM     hydrALAZINE  25 mg Oral TID     insulin glargine  50 Units Subcutaneous QAM     isosorbide mononitrate  60 mg Oral QPM     losartan  100 mg Oral At Bedtime     magnesium oxide (MAG-OX) tablet 400 mg  400 mg Oral At Bedtime     mycophenolate  500 mg Oral BID     ritonavir  100 mg Oral At Bedtime     insulin aspart  1-10 Units Subcutaneous TID AC     insulin aspart  1-7 Units Subcutaneous At Bedtime     insulin aspart  15 Units Subcutaneous TID w/meals       ALLERGIES:    Allergies as of 01/03/2017 - reviewed 01/03/2017   Allergen Reaction Noted     Lisinopril  07/23/2012     Sulfa drugs  07/23/2012       FH:    Family History   Problem Relation Age of Onset     HEART DISEASE Brother 40     CABG     KIDNEY DISEASE Sister      Hypertension Sister      HEART DISEASE Brother      Dilated aorta       SH:    Social History     Social  "History     Marital Status:      Spouse Name: N/A     Number of Children: N/A     Years of Education: N/A     Occupational History     Not on file.     Social History Main Topics     Smoking status: Former Smoker     Types: Cigarettes     Smokeless tobacco: Not on file     Alcohol Use: No     Drug Use: No     Sexual Activity: Not on file     Other Topics Concern     Parent/Sibling W/ Cabg, Mi Or Angioplasty Before 65f 55m? Yes     Caffeine Concern Yes     2 cups daily     Sleep Concern Yes     Special Diet No      more proteins     Exercise Yes     Cardiac rehab      Social History Narrative       PHYSICAL EXAM:    /55 mmHg  Pulse 67  Temp(Src) 98.7  F (37.1  C) (Oral)  Resp 18  Ht 1.803 m (5' 11\")  Wt 89.041 kg (196 lb 4.8 oz)  BMI 27.39 kg/m2  SpO2 94%  GENERAL: pleasant, alert, NAD  HEENT:  Normocephalic. No gross abnormalities.  Pupils equal.  MMM.    CV: RRR, + murmur, no clicks, gallops, or rubs, no edema, no carotid bruits  RESP: Poor airflow, no crackles, no wheezes.   GI: Abdomen obese, soft, NT  MUSCULOSKELETAL: extremities nl - no gross deformities noted  SKIN: no suspicious lesions or rashes, dry to touch  NEURO:  Strength normal and symmetric.   PSYCH: mood good, affect appropriate  LYMPH: No palpable ant/post cervical     LABS:      CBC RESULTS:     Recent Labs  Lab 01/03/17  1245   WBC 9.0   RBC 2.63*   HGB 7.5*   HCT 23.0*   *       BMP RESULTS:    Recent Labs  Lab 01/04/17  1110 01/03/17  1245   NA  --  133   POTASSIUM 3.7 3.3*   CHLORIDE  --  93*   CO2  --  30   BUN  --  27   CR  --  4.37*   GLC  --  164*   PRABHA  --  9.0       INRNo lab results found in last 7 days.     CXR: low lung volume. No infiltrate    DIAGNOSTICS:  Reviewed    A/P:  66 y.o man with HIV, CAD, T2DM and ESRD, admitted for chest pain.     1. ESRD   -TTS, LAVF, eDW 85 kg, 3.5 hrs   -Dr. Chavez    2. CAD s/p coronary stents   -[facility] today    3. Anemia.    -MIHAI with HD     4. HTN and T2DM    Doyle Marcial, " MD [facility] - Nephrology  Office Phone: [phone]  Pager: [phone]

## 2017-01-05 ENCOUNTER — APPOINTMENT (OUTPATIENT)
Dept: GENERAL RADIOLOGY | Facility: CLINIC | Age: 67
DRG: 286 | End: 2017-01-05
Attending: INTERNAL MEDICINE
Payer: MEDICARE

## 2017-01-05 PROBLEM — D64.9 ANEMIA: Status: ACTIVE | Noted: 2017-01-05

## 2017-01-05 LAB
ANION GAP SERPL CALCULATED.3IONS-SCNC: 10 MMOL/L (ref 3–14)
ANION GAP SERPL CALCULATED.3IONS-SCNC: 11 MMOL/L (ref 3–14)
BASE EXCESS BLDV CALC-SCNC: 6.7 MMOL/L
BLD PROD TYP BPU: NORMAL
BLD UNIT ID BPU: 0
BLOOD PRODUCT CODE: NORMAL
BPU ID: NORMAL
BUN SERPL-MCNC: 29 MG/DL (ref 7–30)
BUN SERPL-MCNC: 67 MG/DL (ref 7–30)
CALCIUM SERPL-MCNC: 8.1 MG/DL (ref 8.5–10.1)
CALCIUM SERPL-MCNC: 8.6 MG/DL (ref 8.5–10.1)
CHLORIDE SERPL-SCNC: 96 MMOL/L (ref 94–109)
CHLORIDE SERPL-SCNC: 98 MMOL/L (ref 94–109)
CO2 SERPL-SCNC: 27 MMOL/L (ref 20–32)
CO2 SERPL-SCNC: 29 MMOL/L (ref 20–32)
CREAT SERPL-MCNC: 5.01 MG/DL (ref 0.66–1.25)
CREAT SERPL-MCNC: 9.14 MG/DL (ref 0.66–1.25)
DIFFERENTIAL METHOD BLD: ABNORMAL
EOSINOPHIL # BLD AUTO: 0.1 10E9/L (ref 0–0.7)
EOSINOPHIL NFR BLD AUTO: 2 %
ERYTHROCYTE [DISTWIDTH] IN BLOOD BY AUTOMATED COUNT: 15.8 % (ref 10–15)
ERYTHROCYTE [DISTWIDTH] IN BLOOD BY AUTOMATED COUNT: 15.9 % (ref 10–15)
GFR SERPL CREATININE-BSD FRML MDRD: 12 ML/MIN/1.7M2
GFR SERPL CREATININE-BSD FRML MDRD: 6 ML/MIN/1.7M2
GLUCOSE BLDC GLUCOMTR-MCNC: 119 MG/DL (ref 70–99)
GLUCOSE BLDC GLUCOMTR-MCNC: 128 MG/DL (ref 70–99)
GLUCOSE BLDC GLUCOMTR-MCNC: 151 MG/DL (ref 70–99)
GLUCOSE BLDC GLUCOMTR-MCNC: 200 MG/DL (ref 70–99)
GLUCOSE BLDC GLUCOMTR-MCNC: 221 MG/DL (ref 70–99)
GLUCOSE SERPL-MCNC: 105 MG/DL (ref 70–99)
GLUCOSE SERPL-MCNC: 166 MG/DL (ref 70–99)
HCO3 BLDV-SCNC: 31 MMOL/L (ref 21–28)
HCT VFR BLD AUTO: 19.7 % (ref 40–53)
HCT VFR BLD AUTO: 19.9 % (ref 40–53)
HGB BLD-MCNC: 6.2 G/DL (ref 13.3–17.7)
HGB BLD-MCNC: 6.2 G/DL (ref 13.3–17.7)
LACTATE BLD-SCNC: 1.1 MMOL/L (ref 0.7–2.1)
LYMPHOCYTES # BLD AUTO: 1 10E9/L (ref 0.8–5.3)
LYMPHOCYTES NFR BLD AUTO: 19 %
MCH RBC QN AUTO: 28.2 PG (ref 26.5–33)
MCH RBC QN AUTO: 28.3 PG (ref 26.5–33)
MCHC RBC AUTO-ENTMCNC: 31.2 G/DL (ref 31.5–36.5)
MCHC RBC AUTO-ENTMCNC: 31.5 G/DL (ref 31.5–36.5)
MCV RBC AUTO: 90 FL (ref 78–100)
MCV RBC AUTO: 91 FL (ref 78–100)
MONOCYTES # BLD AUTO: 0.6 10E9/L (ref 0–1.3)
MONOCYTES NFR BLD AUTO: 11 %
NEUTROPHILS # BLD AUTO: 3.3 10E9/L (ref 1.6–8.3)
NEUTROPHILS NFR BLD AUTO: 68 %
O2/TOTAL GAS SETTING VFR VENT: ABNORMAL %
OXYHGB MFR BLDV: 63 %
PCO2 BLDV: 43 MM HG (ref 40–50)
PH BLDV: 7.46 PH (ref 7.32–7.43)
PLATELET # BLD AUTO: 76 10E9/L (ref 150–450)
PLATELET # BLD AUTO: 77 10E9/L (ref 150–450)
PO2 BLDV: 37 MM HG (ref 25–47)
POTASSIUM SERPL-SCNC: 3.9 MMOL/L (ref 3.4–5.3)
POTASSIUM SERPL-SCNC: 4.5 MMOL/L (ref 3.4–5.3)
RBC # BLD AUTO: 2.19 10E12/L (ref 4.4–5.9)
RBC # BLD AUTO: 2.2 10E12/L (ref 4.4–5.9)
SODIUM SERPL-SCNC: 134 MMOL/L (ref 133–144)
SODIUM SERPL-SCNC: 137 MMOL/L (ref 133–144)
TRANSFUSION STATUS PATIENT QL: NORMAL
TRANSFUSION STATUS PATIENT QL: NORMAL
WBC # BLD AUTO: 5 10E9/L (ref 4–11)
WBC # BLD AUTO: 5.4 10E9/L (ref 4–11)

## 2017-01-05 PROCEDURE — G0378 HOSPITAL OBSERVATION PER HR: HCPCS

## 2017-01-05 PROCEDURE — 80048 BASIC METABOLIC PNL TOTAL CA: CPT | Performed by: INTERNAL MEDICINE

## 2017-01-05 PROCEDURE — A9270 NON-COVERED ITEM OR SERVICE: HCPCS | Mod: GY | Performed by: INTERNAL MEDICINE

## 2017-01-05 PROCEDURE — 36415 COLL VENOUS BLD VENIPUNCTURE: CPT | Performed by: INTERNAL MEDICINE

## 2017-01-05 PROCEDURE — 86850 RBC ANTIBODY SCREEN: CPT | Performed by: INTERNAL MEDICINE

## 2017-01-05 PROCEDURE — 99233 SBSQ HOSP IP/OBS HIGH 50: CPT | Performed by: INTERNAL MEDICINE

## 2017-01-05 PROCEDURE — 25000125 ZZHC RX 250: Performed by: INTERNAL MEDICINE

## 2017-01-05 PROCEDURE — 86900 BLOOD TYPING SEROLOGIC ABO: CPT | Performed by: INTERNAL MEDICINE

## 2017-01-05 PROCEDURE — 25000132 ZZH RX MED GY IP 250 OP 250 PS 637: Mod: GY | Performed by: INTERNAL MEDICINE

## 2017-01-05 PROCEDURE — 86923 COMPATIBILITY TEST ELECTRIC: CPT | Performed by: INTERNAL MEDICINE

## 2017-01-05 PROCEDURE — 85025 COMPLETE CBC W/AUTO DIFF WBC: CPT | Performed by: INTERNAL MEDICINE

## 2017-01-05 PROCEDURE — P9040 RBC LEUKOREDUCED IRRADIATED: HCPCS | Performed by: INTERNAL MEDICINE

## 2017-01-05 PROCEDURE — 96372 THER/PROPH/DIAG INJ SC/IM: CPT

## 2017-01-05 PROCEDURE — 83605 ASSAY OF LACTIC ACID: CPT | Performed by: INTERNAL MEDICINE

## 2017-01-05 PROCEDURE — 00000146 ZZHCL STATISTIC GLUCOSE BY METER IP

## 2017-01-05 PROCEDURE — 12000007 ZZH R&B INTERMEDIATE

## 2017-01-05 PROCEDURE — 63400005 ZZH RX 634: Performed by: INTERNAL MEDICINE

## 2017-01-05 PROCEDURE — 87040 BLOOD CULTURE FOR BACTERIA: CPT | Performed by: INTERNAL MEDICINE

## 2017-01-05 PROCEDURE — 86901 BLOOD TYPING SEROLOGIC RH(D): CPT | Performed by: INTERNAL MEDICINE

## 2017-01-05 PROCEDURE — 90937 HEMODIALYSIS REPEATED EVAL: CPT

## 2017-01-05 PROCEDURE — 74000 XR ABDOMEN PORT F1 VW: CPT

## 2017-01-05 PROCEDURE — 85027 COMPLETE CBC AUTOMATED: CPT | Performed by: INTERNAL MEDICINE

## 2017-01-05 PROCEDURE — 82805 BLOOD GASES W/O2 SATURATION: CPT | Performed by: INTERNAL MEDICINE

## 2017-01-05 PROCEDURE — 71010 XR CHEST PORT 1 VW: CPT

## 2017-01-05 RX ORDER — HEPARIN SODIUM 1000 [USP'U]/ML
500 INJECTION, SOLUTION INTRAVENOUS; SUBCUTANEOUS CONTINUOUS
Status: DISCONTINUED | OUTPATIENT
Start: 2017-01-05 | End: 2017-01-05

## 2017-01-05 RX ORDER — ALBUMIN, HUMAN INJ 5% 5 %
250 SOLUTION INTRAVENOUS
Status: DISCONTINUED | OUTPATIENT
Start: 2017-01-05 | End: 2017-01-05

## 2017-01-05 RX ORDER — HYDROMORPHONE HYDROCHLORIDE 1 MG/ML
.3-.5 INJECTION, SOLUTION INTRAMUSCULAR; INTRAVENOUS; SUBCUTANEOUS
Status: DISCONTINUED | OUTPATIENT
Start: 2017-01-05 | End: 2017-01-08

## 2017-01-05 RX ORDER — HEPARIN SODIUM 1000 [USP'U]/ML
500 INJECTION, SOLUTION INTRAVENOUS; SUBCUTANEOUS
Status: COMPLETED | OUTPATIENT
Start: 2017-01-05 | End: 2017-01-05

## 2017-01-05 RX ORDER — CODEINE PHOSPHATE AND GUAIFENESIN 10; 100 MG/5ML; MG/5ML
5-10 SOLUTION ORAL EVERY 4 HOURS PRN
Status: DISCONTINUED | OUTPATIENT
Start: 2017-01-05 | End: 2017-01-09

## 2017-01-05 RX ADMIN — GUAIFENESIN AND CODEINE PHOSPHATE 10 ML: 10; 100 LIQUID ORAL at 08:46

## 2017-01-05 RX ADMIN — HYDROCODONE BITARTRATE AND ACETAMINOPHEN 2 TABLET: 5; 325 TABLET ORAL at 17:25

## 2017-01-05 RX ADMIN — ATORVASTATIN CALCIUM 40 MG: 40 TABLET, FILM COATED ORAL at 22:10

## 2017-01-05 RX ADMIN — GUAIFENESIN AND CODEINE PHOSPHATE 5 ML: 10; 100 LIQUID ORAL at 23:10

## 2017-01-05 RX ADMIN — ACETAMINOPHEN 650 MG: 325 TABLET, FILM COATED ORAL at 19:20

## 2017-01-05 RX ADMIN — ONDANSETRON 4 MG: 4 TABLET, ORALLY DISINTEGRATING ORAL at 21:23

## 2017-01-05 RX ADMIN — GUAIFENESIN AND CODEINE PHOSPHATE 10 ML: 10; 100 LIQUID ORAL at 01:56

## 2017-01-05 RX ADMIN — HEPARIN SODIUM 500 UNITS: 1000 INJECTION, SOLUTION INTRAVENOUS; SUBCUTANEOUS at 11:58

## 2017-01-05 RX ADMIN — ABACAVIR 600 MG: 300 TABLET ORAL at 20:36

## 2017-01-05 RX ADMIN — CARVEDILOL 12.5 MG: 12.5 TABLET, FILM COATED ORAL at 17:15

## 2017-01-05 RX ADMIN — MYCOPHENOLATE MOFETIL 500 MG: 250 CAPSULE ORAL at 20:33

## 2017-01-05 RX ADMIN — CALCIUM ACETATE 2001 MG: 667 CAPSULE ORAL at 08:13

## 2017-01-05 RX ADMIN — HYDRALAZINE HYDROCHLORIDE 25 MG: 25 TABLET ORAL at 15:50

## 2017-01-05 RX ADMIN — CHLORHEXIDINE GLUCONATE 15 ML: 1.2 RINSE ORAL at 20:38

## 2017-01-05 RX ADMIN — GUAIFENESIN AND CODEINE PHOSPHATE 10 ML: 10; 100 LIQUID ORAL at 16:34

## 2017-01-05 RX ADMIN — ISOSORBIDE MONONITRATE 60 MG: 30 TABLET, EXTENDED RELEASE ORAL at 20:33

## 2017-01-05 RX ADMIN — INSULIN GLARGINE 50 UNITS: 100 INJECTION, SOLUTION SUBCUTANEOUS at 08:13

## 2017-01-05 RX ADMIN — DAPSONE 100 MG: 25 TABLET ORAL at 22:10

## 2017-01-05 RX ADMIN — HYDROMORPHONE HYDROCHLORIDE 0.5 MG: 1 INJECTION, SOLUTION INTRAMUSCULAR; INTRAVENOUS; SUBCUTANEOUS at 22:04

## 2017-01-05 RX ADMIN — ASPIRIN 81 MG 81 MG: 81 TABLET ORAL at 20:33

## 2017-01-05 RX ADMIN — GABAPENTIN 600 MG: 300 CAPSULE ORAL at 20:33

## 2017-01-05 RX ADMIN — CALCIUM ACETATE 2001 MG: 667 CAPSULE ORAL at 12:32

## 2017-01-05 RX ADMIN — MAGNESIUM OXIDE TAB 400 MG (241.3 MG ELEMENTAL MG) 400 MG: 400 (241.3 MG) TAB at 22:10

## 2017-01-05 RX ADMIN — CLOPIDOGREL 75 MG: 75 TABLET, FILM COATED ORAL at 20:34

## 2017-01-05 RX ADMIN — DARUNAVIR 800 MG: 800 TABLET, FILM COATED ORAL at 20:40

## 2017-01-05 RX ADMIN — GABAPENTIN 300 MG: 300 CAPSULE ORAL at 15:50

## 2017-01-05 RX ADMIN — ACETAMINOPHEN 650 MG: 325 TABLET, FILM COATED ORAL at 08:46

## 2017-01-05 RX ADMIN — INSULIN ASPART 15 UNITS: 100 INJECTION, SOLUTION INTRAVENOUS; SUBCUTANEOUS at 09:08

## 2017-01-05 RX ADMIN — INSULIN ASPART 15 UNITS: 100 INJECTION, SOLUTION INTRAVENOUS; SUBCUTANEOUS at 13:17

## 2017-01-05 RX ADMIN — EPOETIN ALFA 5000 UNITS: 3000 SOLUTION INTRAVENOUS; SUBCUTANEOUS at 12:12

## 2017-01-05 RX ADMIN — RITONAVIR 100 MG: 100 TABLET, FILM COATED ORAL at 22:11

## 2017-01-05 RX ADMIN — HYDROMORPHONE HYDROCHLORIDE 0.2 MG: 10 INJECTION, SOLUTION INTRAMUSCULAR; INTRAVENOUS; SUBCUTANEOUS at 19:38

## 2017-01-05 RX ADMIN — CHLORHEXIDINE GLUCONATE 15 ML: 1.2 RINSE ORAL at 08:13

## 2017-01-05 NOTE — PROGRESS NOTES
"[facility]  Hospitalist Progress Note  Marquez Tolliver,  01/05/2017    Reason for Stay (Diagnosis): chest pain         Assessment and Plan:      Summary of Stay: Donald Raza is a 66 year old male admitted on 1/3/2017 with chest pain     Problem List:    1. Chest pain with known coronary artery disease.  Cardiology consult appreciated.  Angio done 1/4/17.  No significant lesions noted.  Continue ASA, Lipitor, Coreg, Plavix, Imdur, Hydralazine, Cozaar.  2. End stage kidney failure.  Hemodialysis per Nephrology.  Continue Phoslo.  3. Acute on chronic anemia.  Hgb 6.2 this morning.  Patient symptomatic.  Transfuse 1 unit PRBC's.  Monitor.  4. HIV.  Continue Ziagen, Prezista, Tivicay, and Norvir.  Dapsone.  5. HTN.  Continue Cozaar, Imdur, Norvasc, Coreg, Hydralazine.  6. Diabetes Mellitus.  Increase Scheduled Novolog to 16 units TID.  Change diet to Moderate consistent carbohydrate.  Continue Lantus, Novolog SSI.  7. Hypokalemia.  Correct with Dialysis.  Monitor.  DVT Prophylaxis: Ambulate every shift  Code Status: Full Code  Discharge Dispo: Home  Estimated Disch Date / # of Days until Disch: 1-2        Interval History (Subjective):      Feels very weak and tired today.  No CP, SOB, F/C, N/V, or diarrhea.                  Physical Exam:      Last Vital Signs:  /63 mmHg  Pulse 67  Temp(Src) 97.9  F (36.6  C) (Oral)  Resp 20  Ht 1.803 m (5' 11\")  Wt 85 kg (187 lb 6.3 oz)  BMI 26.15 kg/m2  SpO2 95%    Gen:  NAD, A&Ox3.  Eyes:  PERRL, sclera anicteric.  OP:  MMM, no lesions.  Neck:  Supple.  CV:  Regular, +1/6 murmur.  Lung:  CTA b/l, normal effort.  Ab:  +BS, soft.  Skin:  Warm, dry to touch.  No rash.  Ext:  No pitting edema LE b/l.           Medications:      All current medications were reviewed with changes reflected in problem list.         Data:      All new lab and imaging data was reviewed.   Labs:    Recent Labs  Lab 01/05/17  0644      POTASSIUM 4.5   CHLORIDE 96   CO2 " 27   ANIONGAP 11   *   BUN 67*   CR 9.14*   GFRESTIMATED 6*   GFRESTBLACK 7*   PRABHA 8.6       Recent Labs  Lab 01/05/17  0644   WBC 5.4   HGB 6.2*   HCT 19.7*   MCV 90   PLT 77*      Imaging:   No results found for this or any previous visit (from the past 24 hour(s)).

## 2017-01-05 NOTE — PLAN OF CARE
Problem: Goal Outcome Summary  Goal: Goal Outcome Summary  Outcome: Improving  AOX4. VSS. Denies pain. C/o cough, robitussin given. Plan for dialysis today. Right groining site CDI, femoral pulse present CMS intact. Left AV fistula bruit present. Plan to discharge home today.

## 2017-01-05 NOTE — PROGRESS NOTES
RN to RN reporet provided to dialysis nurse Ruby.  Pt to dialysis 1045.  Run will go until 1500.    Pt hgb 6.2 today, platelets decreased to 77. Pt. symptomatic dyspnea and dizziness when sitting up in bed. 1 unit PRBC ordered - blood ready at 1400 but per dialysis RN, start after run.  Groin sit 1 inch hard lump, no add'l hematoma/bleeding/bruising noted. PP+ BLE, warm to touch. Neuropathy bilateral feet baseline. Murmur. Lungs clear. L UE fistula + bruit/thrill. Tele NSR 60's.  Cardiology has signed off. Neph following.

## 2017-01-05 NOTE — PROGRESS NOTES
SPIRITUAL HEALTH SERVICES Progress Note  [facility] Med. Surg. 3    Attempted to visit pt per his request for  support.  Pt was in dialysis and asked me to return at 3pm when he finished, but he was still in dialysis when I returned.  Unit  will follow up tomorrow.    Jero Martinez M.Div., [facility]  Staff   Pager [phone]

## 2017-01-05 NOTE — PROGRESS NOTES
POTASSIUM      4.5   1/5/2017    HGB      6.2   1/5/2017    Weight: 85 kg (187 lb 6.3 oz)    All safety checks completed, air detectors on, venous and arterial parameters  set prior to treatment.  Consent verified.  Pt dialyzed for  3.5 hours via LAF   With a net volume removal of 2.5 L on K3 bath.  Heparin given during run: 2500units  Medications given Epogen  Complications: None  Seen by Dr. Marcial during treatment.  EDW 85 Post run weight unknown; weigh upon return to unit  Pt received education on procedure and ESRD while on dialysis   Transducer checked every 15 minutes  Water alarms on  See flowsheet for details of dialysis run  Pt dialyzes [place] Sat in [place]  Post run assessment charted in [facility] Dialysis Flowsheet  Ruby Carlos RN

## 2017-01-05 NOTE — PROGRESS NOTES
" Inpatient Dialysis Progress Note        Assessment and Plan:     1. ESRD  2. CAD s/p coronary stents. [facility] 1/3 without flow limiting disease.   3. HTN  4. Anemia  5. HIV    Plan.   1. UF net 2.5 liters, 10K units epogen  2. Team is planning 1 prbc transfusion.        Interval History:       \"I feel so-so, \" he says. He has some worsening SOB today.          Dialysis Parameters:     Filed Vitals:    01/03/17 1244 01/03/17 1703 01/05/17 1110   Weight: 86.183 kg (190 lb) 89.041 kg (196 lb 4.8 oz) 85 kg (187 lb 6.3 oz)     I/O last 3 completed shifts:  In: 268 [P.O.:240; I.V.:28]  Out: 650 [Urine:650]  Temp:  [97.9  F (36.6  C)-99.9  F (37.7  C)] 97.9  F (36.6  C)  Heart Rate:  [60-71] 62  Resp:  [18-20] 20  BP: (102-133)/(50-65) 102/50 mmHg  SpO2:  [94 %-95 %] 95 %    Current Weight: 85?  Dry Weight: 85  Dialysis Temp: 36.5  C  Access Device: AVF  Access Site: L arm  Dialyzer: revaclear  Dialysis Bath: 3K  Sodium Profile: none  UF Goal: 2.5 liters net  Blood Flow Rate (mL/min): 400  Total Treatment Time (hrs): 3.5  Heparin: Low dose as required    Review of Systems:        Medications:     EPO dose: 10K  Zemplar: none  IV Fe: none       Physical Exam:     Vitals were reviewed  Patient Vitals for the past 24 hrs:   BP Temp Temp src Heart Rate Resp SpO2 Weight   01/05/17 1145 102/50 mmHg - - 62 - - -   01/05/17 1130 115/56 mmHg - - - - - -   01/05/17 1120 113/53 mmHg - - 60 20 - -   01/05/17 1110 112/53 mmHg 97.9  F (36.6  C) Oral 62 20 - 85 kg (187 lb 6.3 oz)   01/05/17 1049 106/50 mmHg - - - - - -   01/05/17 0803 106/54 mmHg 98.2  F (36.8  C) Oral 62 20 95 % -   01/05/17 0346 119/60 mmHg 98.9  F (37.2  C) Oral 67 18 94 % -   01/05/17 0110 112/59 mmHg 99.3  F (37.4  C) Oral 63 20 94 % -   01/04/17 2200 - 98.7  F (37.1  C) Oral - - - -   01/04/17 2156 130/59 mmHg - - 68 - - -   01/04/17 2047 126/56 mmHg - - 71 - - -   01/04/17 2015 133/63 mmHg - - 68 - - -   01/04/17 1930 132/65 mmHg - - 67 20 95 % -   01/04/17 1901 " 127/64 mmHg - - 65 - - -   17 1832 132/63 mmHg - - 66 - - -   17 1801 131/63 mmHg - - 65 - - -   17 1742 123/62 mmHg - - 64 - - -   17 1725 123/63 mmHg - - 62 - - -   17 1549 127/64 mmHg 99.9  F (37.7  C) Oral 63 20 95 % -       Temperatures:  Current - Temp: 97.9  F (36.6  C); Max - Temp  Av.8  F (37.1  C)  Min: 97.9  F (36.6  C)  Max: 99.9  F (37.7  C)  Respiration range: Resp  Av.7  Min: 18  Max: 20  Pulse range: No Data Recorded  Blood pressure range: Systolic (24hrs), Av mmHg, Min:102 mmHg, Max:133 mmHg  ; Diastolic (24hrs), Av mmHg, Min:50 mmHg, Max:65 mmHg    Pulse oximetry range: SpO2  Av.6 %  Min: 94 %  Max: 95 %  I/O last 3 completed shifts:  In: 268 [P.O.:240; I.V.:28]  Out: 650 [Urine:650]    Intake/Output Summary (Last 24 hours) at 17 1220  Last data filed at 17 0934   Gross per 24 hour   Intake    768 ml   Output    150 ml   Net    618 ml       Gen: NAD  CV: RRR, + m urmur  Pulm: BS diminish. No wheezes  Abd: obese, soft, NT  Ext: no edema  Neuro: a/o cx3    Data:     Recent Labs   Lab Test  17   0644  17   1110  17   1245   NA  134   --   133   POTASSIUM  4.5  3.7  3.3*   CHLORIDE  96   --   93*   CO2  27   --   30   ANIONGAP  11   --   10   GLC  166*   --   164*   BUN  67*   --   27   CR  9.14*   --   4.37*   PRABHA  8.6   --   9.0       HEMOGLOBIN   Date Value Ref Range Status   2017 6.2* 13.3 - 17.7 g/dL Final     Comment:     This result has been called to JORDON ASHLEY ON MS3 by Linda Penny on 2017   at 0807, and has been read back.                Doyle Marcial MD

## 2017-01-06 ENCOUNTER — APPOINTMENT (OUTPATIENT)
Dept: CT IMAGING | Facility: CLINIC | Age: 67
DRG: 286 | End: 2017-01-06
Attending: INTERNAL MEDICINE
Payer: MEDICARE

## 2017-01-06 PROBLEM — J96.90 RESPIRATORY FAILURE (H): Status: ACTIVE | Noted: 2017-01-06

## 2017-01-06 LAB
BASE EXCESS BLDA CALC-SCNC: 2.5 MMOL/L
ERYTHROCYTE [DISTWIDTH] IN BLOOD BY AUTOMATED COUNT: 16.1 % (ref 10–15)
FLUAV+FLUBV RNA SPEC QL NAA+PROBE: NORMAL
FLUAV+FLUBV RNA SPEC QL NAA+PROBE: NORMAL
GLUCOSE BLDC GLUCOMTR-MCNC: 101 MG/DL (ref 70–99)
GLUCOSE BLDC GLUCOMTR-MCNC: 163 MG/DL (ref 70–99)
GLUCOSE BLDC GLUCOMTR-MCNC: 70 MG/DL (ref 70–99)
GLUCOSE BLDC GLUCOMTR-MCNC: 84 MG/DL (ref 70–99)
GLUCOSE BLDC GLUCOMTR-MCNC: 90 MG/DL (ref 70–99)
GLUCOSE BLDC GLUCOMTR-MCNC: 93 MG/DL (ref 70–99)
GLUCOSE BLDC GLUCOMTR-MCNC: 94 MG/DL (ref 70–99)
HCO3 BLD-SCNC: 28 MMOL/L (ref 21–28)
HCT VFR BLD AUTO: 21.7 % (ref 40–53)
HGB BLD-MCNC: 6.2 G/DL (ref 13.3–17.7)
HGB BLD-MCNC: 6.7 G/DL (ref 13.3–17.7)
MCH RBC QN AUTO: 28.4 PG (ref 26.5–33)
MCHC RBC AUTO-ENTMCNC: 30.9 G/DL (ref 31.5–36.5)
MCV RBC AUTO: 92 FL (ref 78–100)
O2/TOTAL GAS SETTING VFR VENT: ABNORMAL %
OXYHGB MFR BLD: 61 % (ref 92–100)
PCO2 BLD: 47 MM HG (ref 35–45)
PH BLD: 7.38 PH (ref 7.35–7.45)
PLATELET # BLD AUTO: 82 10E9/L (ref 150–450)
PO2 BLD: 37 MM HG (ref 80–105)
RBC # BLD AUTO: 2.36 10E12/L (ref 4.4–5.9)
RSV RNA SPEC NAA+PROBE: NORMAL
SPECIMEN SOURCE: NORMAL
WBC # BLD AUTO: 6.4 10E9/L (ref 4–11)

## 2017-01-06 PROCEDURE — 25000132 ZZH RX MED GY IP 250 OP 250 PS 637: Mod: GY | Performed by: INTERNAL MEDICINE

## 2017-01-06 PROCEDURE — 85018 HEMOGLOBIN: CPT | Performed by: INTERNAL MEDICINE

## 2017-01-06 PROCEDURE — 40000275 ZZH STATISTIC RCP TIME EA 10 MIN

## 2017-01-06 PROCEDURE — 25000128 H RX IP 250 OP 636: Performed by: INTERNAL MEDICINE

## 2017-01-06 PROCEDURE — A9270 NON-COVERED ITEM OR SERVICE: HCPCS | Mod: GY | Performed by: INTERNAL MEDICINE

## 2017-01-06 PROCEDURE — 25500064 ZZH RX 255 OP 636: Performed by: INTERNAL MEDICINE

## 2017-01-06 PROCEDURE — 94640 AIRWAY INHALATION TREATMENT: CPT | Mod: 76

## 2017-01-06 PROCEDURE — 85027 COMPLETE CBC AUTOMATED: CPT | Performed by: INTERNAL MEDICINE

## 2017-01-06 PROCEDURE — 87641 MR-STAPH DNA AMP PROBE: CPT | Performed by: INTERNAL MEDICINE

## 2017-01-06 PROCEDURE — 36600 WITHDRAWAL OF ARTERIAL BLOOD: CPT

## 2017-01-06 PROCEDURE — 99233 SBSQ HOSP IP/OBS HIGH 50: CPT | Performed by: INTERNAL MEDICINE

## 2017-01-06 PROCEDURE — 87631 RESP VIRUS 3-5 TARGETS: CPT | Performed by: INTERNAL MEDICINE

## 2017-01-06 PROCEDURE — 20000003 ZZH R&B ICU

## 2017-01-06 PROCEDURE — 74177 CT ABD & PELVIS W/CONTRAST: CPT

## 2017-01-06 PROCEDURE — 94640 AIRWAY INHALATION TREATMENT: CPT

## 2017-01-06 PROCEDURE — 25000125 ZZHC RX 250: Performed by: INTERNAL MEDICINE

## 2017-01-06 PROCEDURE — 82805 BLOOD GASES W/O2 SATURATION: CPT | Performed by: INTERNAL MEDICINE

## 2017-01-06 PROCEDURE — 87640 STAPH A DNA AMP PROBE: CPT | Performed by: INTERNAL MEDICINE

## 2017-01-06 PROCEDURE — 71260 CT THORAX DX C+: CPT

## 2017-01-06 PROCEDURE — 00000146 ZZHCL STATISTIC GLUCOSE BY METER IP

## 2017-01-06 PROCEDURE — 36415 COLL VENOUS BLD VENIPUNCTURE: CPT | Performed by: INTERNAL MEDICINE

## 2017-01-06 RX ORDER — BENZONATATE 100 MG/1
100 CAPSULE ORAL 3 TIMES DAILY PRN
Status: DISCONTINUED | OUTPATIENT
Start: 2017-01-06 | End: 2017-01-08

## 2017-01-06 RX ORDER — LEVOFLOXACIN 5 MG/ML
750 INJECTION, SOLUTION INTRAVENOUS ONCE
Status: COMPLETED | OUTPATIENT
Start: 2017-01-06 | End: 2017-01-07

## 2017-01-06 RX ORDER — IPRATROPIUM BROMIDE AND ALBUTEROL SULFATE 2.5; .5 MG/3ML; MG/3ML
3 SOLUTION RESPIRATORY (INHALATION) EVERY 4 HOURS PRN
Status: DISCONTINUED | OUTPATIENT
Start: 2017-01-06 | End: 2017-01-07

## 2017-01-06 RX ORDER — LEVOFLOXACIN 5 MG/ML
500 INJECTION, SOLUTION INTRAVENOUS
Status: DISCONTINUED | OUTPATIENT
Start: 2017-01-08 | End: 2017-01-17

## 2017-01-06 RX ORDER — IOPAMIDOL 755 MG/ML
500 INJECTION, SOLUTION INTRAVASCULAR ONCE
Status: COMPLETED | OUTPATIENT
Start: 2017-01-06 | End: 2017-01-06

## 2017-01-06 RX ORDER — NALOXONE HYDROCHLORIDE 0.4 MG/ML
.1-.4 INJECTION, SOLUTION INTRAMUSCULAR; INTRAVENOUS; SUBCUTANEOUS
Status: DISCONTINUED | OUTPATIENT
Start: 2017-01-06 | End: 2017-01-07

## 2017-01-06 RX ADMIN — MYCOPHENOLATE MOFETIL 500 MG: 250 CAPSULE ORAL at 09:50

## 2017-01-06 RX ADMIN — CLOPIDOGREL 75 MG: 75 TABLET, FILM COATED ORAL at 21:06

## 2017-01-06 RX ADMIN — MYCOPHENOLATE MOFETIL 500 MG: 250 CAPSULE ORAL at 21:23

## 2017-01-06 RX ADMIN — IPRATROPIUM BROMIDE AND ALBUTEROL SULFATE 3 ML: .5; 3 SOLUTION RESPIRATORY (INHALATION) at 19:17

## 2017-01-06 RX ADMIN — CARVEDILOL 12.5 MG: 12.5 TABLET, FILM COATED ORAL at 18:19

## 2017-01-06 RX ADMIN — ACETAMINOPHEN 650 MG: 325 TABLET, FILM COATED ORAL at 06:36

## 2017-01-06 RX ADMIN — ACETAMINOPHEN 650 MG: 325 TABLET, FILM COATED ORAL at 21:06

## 2017-01-06 RX ADMIN — AMLODIPINE BESYLATE 5 MG: 5 TABLET ORAL at 21:06

## 2017-01-06 RX ADMIN — CHLORHEXIDINE GLUCONATE 15 ML: 1.2 RINSE ORAL at 09:49

## 2017-01-06 RX ADMIN — CALCIUM ACETATE 2001 MG: 667 CAPSULE ORAL at 13:48

## 2017-01-06 RX ADMIN — ATORVASTATIN CALCIUM 40 MG: 40 TABLET, FILM COATED ORAL at 21:06

## 2017-01-06 RX ADMIN — DARUNAVIR 800 MG: 800 TABLET, FILM COATED ORAL at 21:32

## 2017-01-06 RX ADMIN — GABAPENTIN 600 MG: 300 CAPSULE ORAL at 21:06

## 2017-01-06 RX ADMIN — LOSARTAN POTASSIUM 100 MG: 100 TABLET, FILM COATED ORAL at 21:23

## 2017-01-06 RX ADMIN — BENZONATATE 100 MG: 100 CAPSULE ORAL at 01:50

## 2017-01-06 RX ADMIN — HYDRALAZINE HYDROCHLORIDE 25 MG: 25 TABLET ORAL at 09:53

## 2017-01-06 RX ADMIN — ABACAVIR 600 MG: 300 TABLET ORAL at 21:14

## 2017-01-06 RX ADMIN — ISOSORBIDE MONONITRATE 60 MG: 30 TABLET, EXTENDED RELEASE ORAL at 21:23

## 2017-01-06 RX ADMIN — CHLORHEXIDINE GLUCONATE 15 ML: 1.2 RINSE ORAL at 21:05

## 2017-01-06 RX ADMIN — IOPAMIDOL 94 ML: 755 INJECTION, SOLUTION INTRAVENOUS at 20:34

## 2017-01-06 RX ADMIN — ACETAMINOPHEN 650 MG: 325 TABLET, FILM COATED ORAL at 12:47

## 2017-01-06 RX ADMIN — SODIUM CHLORIDE 64 ML: 9 INJECTION, SOLUTION INTRAVENOUS at 20:39

## 2017-01-06 RX ADMIN — GABAPENTIN 300 MG: 300 CAPSULE ORAL at 09:49

## 2017-01-06 RX ADMIN — RITONAVIR 100 MG: 100 TABLET, FILM COATED ORAL at 21:23

## 2017-01-06 RX ADMIN — HYDRALAZINE HYDROCHLORIDE 25 MG: 25 TABLET ORAL at 15:56

## 2017-01-06 RX ADMIN — INSULIN GLARGINE 50 UNITS: 100 INJECTION, SOLUTION SUBCUTANEOUS at 09:53

## 2017-01-06 RX ADMIN — GUAIFENESIN AND CODEINE PHOSPHATE 5 ML: 10; 100 LIQUID ORAL at 10:06

## 2017-01-06 RX ADMIN — IPRATROPIUM BROMIDE AND ALBUTEROL SULFATE 3 ML: .5; 3 SOLUTION RESPIRATORY (INHALATION) at 22:29

## 2017-01-06 RX ADMIN — CARVEDILOL 12.5 MG: 12.5 TABLET, FILM COATED ORAL at 09:53

## 2017-01-06 RX ADMIN — HYDRALAZINE HYDROCHLORIDE 25 MG: 25 TABLET ORAL at 21:06

## 2017-01-06 RX ADMIN — CALCIUM ACETATE 2001 MG: 667 CAPSULE ORAL at 09:49

## 2017-01-06 RX ADMIN — ACETAMINOPHEN 650 MG: 325 TABLET, FILM COATED ORAL at 17:06

## 2017-01-06 RX ADMIN — DAPSONE 100 MG: 25 TABLET ORAL at 21:05

## 2017-01-06 RX ADMIN — ASPIRIN 81 MG 81 MG: 81 TABLET ORAL at 21:06

## 2017-01-06 NOTE — PLAN OF CARE
Problem: Goal Outcome Summary  Goal: Goal Outcome Summary  Outcome: No Change  AO VSS. Up with sba. Frequent non productive cough,  PRN Robitussin given with no relief, MD ordered PRN tessalon cough improved.LS crackles 94% on 2 Liters.   Hgb 6.2, MD aware ordered recheck this morning and wants maynor MARIE to address. Rt femoral site CDI femoral pulse palpable.   Bg70, juice given recheck 93

## 2017-01-06 NOTE — PROGRESS NOTES
"[facility]  Hospitalist Progress Note  Marquez Tolliver,  01/06/2017    Reason for Stay (Diagnosis): Chest pain.         Assessment and Plan:      Summary of Stay: Donald Raza is a 66 year old male admitted on 1/3/2017 with chest pain     Problem List:    1. Chest pain with known coronary artery disease.  Cardiology consult appreciated. Angio done 1/4/17.  No significant lesions noted.  Continue ASA, Lipitor, Coreg, Plavix, Imdur, Hydralazine, Cozaar.  2. End stage kidney failure.  Hemodialysis per Nephrology.  Continue Phoslo.  3. Fever.  Unknown source.  Infectious disease consult.  4. Acute on chronic anemia.  Hgb 6.7 this morning.  Did not get full transfusion yesterday.  Patient symptomatic.  Transfuse 1 unit PRBC's.  Monitor.  5. HIV.  Continue Ziagen, Prezista, Tivicay, and Norvir.  Dapsone.  6. HTN.  Continue Cozaar, Imdur, Norvasc, Coreg, Hydralazine.  7. Diabetes Mellitus.  Continue Lantus, Novolog SSI.    DVT Prophylaxis: Ambulate every shift  Code Status: Full Code  Discharge Dispo: Home  Estimated Disch Date / # of Days until Disch: 2        Interval History (Subjective):      Feverish.  No CP, SOB, N/V, or diarrhea.                  Physical Exam:      Last Vital Signs:  /65 mmHg  Pulse 69  Temp(Src) 102.6  F (39.2  C) (Oral)  Resp 20  Ht 1.803 m (5' 11\")  Wt 85 kg (187 lb 6.3 oz)  BMI 26.15 kg/m2  SpO2 95%    Gen:  NAD, A&Ox3.  Eyes:  PERRL, sclera anicteric.  OP:  MMM, no lesions.  Neck:  Supple.  CV:  Regular, no murmurs.  Lung:  CTA b/l, normal effort.  Ab:  +BS, soft.  Skin:  Warm, dry to touch.  No rash.  Ext:  No pitting edema LE b/l.           Medications:      All current medications were reviewed with changes reflected in problem list.         Data:      All new lab and imaging data was reviewed.   Labs:    Recent Labs  Lab 01/05/17 2000      POTASSIUM 3.9   CHLORIDE 98   CO2 29   ANIONGAP 10   *   BUN 29   CR 5.01*   GFRESTIMATED 12* "   GFRESTBLACK 14*   PRABHA 8.1*       Recent Labs  Lab 01/06/17  0619   WBC 6.4   HGB 6.7*   HCT 21.7*   MCV 92   PLT 82*      Imaging:   Recent Results (from the past 24 hour(s))   X-ray Abdomen flat port    Narrative    ABDOMEN TWO-THREE VIEW  1/5/2017 8:11 PM     HISTORY: Free air, abdominal pain and fever.    COMPARISON: None.    FINDINGS: The bowel gas pattern is nonspecific. There is no gross free  air, but evaluation of free air is limited by motion artifact and  positioning. There is no pneumatosis. The lung bases are unremarkable.      Impression    IMPRESSION: Nonspecific bowel gas pattern.     RADHA JORDAN MD   XR Chest Port 1 View    Narrative    CHEST ONE VIEW UPRIGHT  1/5/2017  8:13 PM     HISTORY: Abdominal pain and high fever.    COMPARISON: None.      Impression    IMPRESSION: No free air seen underneath the diaphragm. Lungs grossly  clear.    RADHA JORDAN MD

## 2017-01-06 NOTE — PROGRESS NOTES
SPIRITUAL HEALTH SERVICES  SPIRITUAL ASSESSMENT Progress Note  [facility] Med/Surg 348    PRIMARY FOCUS:     Goals of care    Symptom/pain management    Emotional/spiritual/Roman [employer] distress    Support for coping    ILLNESS CIRCUMSTANCES:   Reviewed documentation. Reflective conversation shared with Donald, his wife, and one of his daughters which integrated elements of illness and family narratives.     Context of Serious Illness/Symptom(s) - Donald stated that he was feeling better today but that he had a bad night last night. He receives regular dialysis treatments Per his nurse, he is in the hospital for chest pains and low hemoglobin.     Persons/Resources Involved - His wife and daughters are his primary support. He also has brothers and grandchildren that are very active and supportive.     DISTRESS:     Emotional/Existential/Relational Distress - Described that dialysis is very difficult for him. It wipes him out for the whole day and is frustrating because its very difficult but he knows he needs it.     Spiritual/Mandaen Distress - None expressed    Social/Cultural/Economic Distress - none expressed    SPIRIT (Coping):     Hoahaoism/Shania - Gnosticist    Spiritual Practice(s) - Prayer and gladly welcomed prayer.     Emotional/Existential/Relational Connections - none expressed    SENSE-MAKING:    Goals of Care - To be discharged possibly Sunday or sooner if possible.    Meaning/Sense-Making - We all have hard times and it is what it is.    PLAN: Donald was considering asking for a visit later today when we could speak one-on-one. I will be available for that request.      Stewart Grace  Chaplain Resident  Pager [phone]

## 2017-01-06 NOTE — PLAN OF CARE
Problem: Goal Outcome Summary  Goal: Goal Outcome Summary  Outcome: No Change  See VS flow sheet and MD note for for details (Tmax 102.6, O2 requirements increased), no co pain/cp/sob, FRANCO.   BG 84, 163.  Tele SR, murmur detected.  LS coarse, congested non-productive cough.  LAV fistula +/+, gen wkns, R groin site WDL, +PP, T/N to toes at baseline.  HGB 6.7 (up from 6.2), md aware, plan is to have HD later today with a blood transfusion, unsure of time.  Continue poc and monitoring.         ADDENDUM: ID consulted, paged dr key regarding HD and blood.

## 2017-01-06 NOTE — CONSULTS
INFECTIOUS DISEASE CONSULTATION       REFERRING PHYSICIAN:  Dr. Marquez Tolliver.       IMPRESSION:   1.  A 66-year-old male, very complex medical history, known to me from prior admission, currently is admitted with chest pain, rule out coronary artery disease, now has developed some increased cough and respiratory symptoms, although chest x-ray is negative.  New acute fever in the hospital as well, question respiratory infection, although not obvious, no leukocytosis, not really productive sputum and again chest x-ray negative.   2.  Chronic human immunodeficiency virus infection for over 30 years, treated at [facility], most recent T cells were 3 weeks ago at which time T cells were 263 and undetectable virus, i.e., unlikely to have an opportunistic infection, is on a fairly complicated 5-drug regimen, but well controlled.   3.  Known coronary disease, does not appear he has that as an explanation for current symptoms.   4.  Diabetes mellitus.   5.  End-stage renal disease on chronic dialysis on the transplant list.   6.  Sulfa allergy.   7.  Nephrolithiasis.      RECOMMENDATIONS:   1.  Continue his current antiretroviral therapy.   2.  Follow fever and await cultures.   3.  If fever continues, would get a CT chest, abdomen and pelvis looking for occult hidden infection, not likely at risk for major opportunistic infections, but if fever persists, larger infection workup will ensue.  Of note, the patient had a very similar presentation in 03/2016, at which time cultures were negative, the fever resolved without particular treatment and no eventual infection diagnosis was made.      HISTORY:  Donald Raza is a 66-year-old male is known to me from 03/2016.  He has a complex long-term history that includes underlying end-stage renal disease on dialysis, chronic lung disease, coronary artery disease and HIV infection.  The HIV infection goes back 30 years and he is on a 5-drug regimen.  He was seen by Dr. Ordoñez  at the [facility] system, and he actually was just seen 3 weeks ago at which time his T cells were 263 and viral load undetectable.  He does not miss any doses and has been on his treatment long-term.  He has not had any opportunistic infections in many years and for the most part his T cells have been in the 100s over that time.      He was hospitalized here in March at which time during dialysis he developed acute fevers, chills, chest and abdominal pain.  He had a workup at that time that was negative including all cultures negative.  He got some antibiotics initially, but they eventually were stopped, it resolved and he never had any formal diagnosis.  He also of note was just hospitalized briefly at [facility] a few weeks ago, at which time he had acute anemia and some degree of chest pain.  He says he had some respiratory symptoms at that time, was not diagnosed with pneumonia or treated with antibiotics.  He was seen by Dr. Ordoñez in the clinic as described above at around that time and his T cells were 263.  He then did relatively well until the last several days when he developed acute chest discomfort just prior to this admission; he did not think he was having fevers or chills, but shortly after admission developed temperature to 101 plus.  He has some degree of chest pain still now, has some hypoxia and cough; workup is underway including cultures, which are so far unrevealing and chest x-ray was negative.      PAST MEDICAL HISTORY:  The prior episodes similar to this in March, history of chronic lung disease, history of coronary artery disease, see history in the medicine notes, history of end-stage renal disease on dialysis, HIV infection without major opportunistic infections for many years, on antiretroviral therapy, has been undetectable for years.      MEDICATIONS:  Medication list noted and reviewed.  From an HIV standpoint includes Abacavir, darunavir , dolutegravir previously and ritonavir.       ALLERGIES:  Sulfa.      SOCIAL AND FAMILY HISTORY:  Lives independently, immigrant for [place], has been T spot negative times several in the past, prior cigarette smoker, quit years ago, no recent travels or exposures, no known resistant pathogens.      REVIEW OF SYSTEMS:  Largely as above.  Admits to some degree of cough, feels somewhat ill, is not really noticing the fever, but it has went to 102 degrees, has had some abdominal pain, but not currently.  No significant diarrhea.  No rashes or skin lesions.  No one else he has been around recently has been ill.      PHYSICAL EXAMINATION:   GENERAL:  The patient appears his stated age; he does not look particularly toxic or ill.   VITAL SIGNS:  Currently include a pulse of 100, respiratory rate 24, O2 sats okay, but on oxygen, temperature has been 101.8.    HEENT:  No thrush or intraoral lesions.  Pupils reactive.   NECK:  Supple, nontender, no lymphadenopathy.   HEART:  Regular rhythm, no major murmur, but tachycardic at about 100.   LUNGS:  Congestion bilaterally, okay air movement.   ABDOMEN:  Soft and nontender.   EXTREMITIES:  No significant rashes or edema.   NEUROLOGIC:  Seems intact including mentation, although somewhat slow.      LABORATORY:  White count normal.  Recent T cells as described above.  Chest x-ray, no significant infiltrates.  Blood cultures so far negative.      Thank you very much for this consultation.  We will follow this patient with you.         ASHOK MI MD             D: 2017 14:36   T: 2017 15:29   MT: [id number]      Name:     GREGORIO BRUSH   MRN:      [id number]        Account:       [id number]   :      1950           Consult Date:  2017      Document: [id number]       cc: Marquez Tolliver DO

## 2017-01-06 NOTE — CONSULTS
ID consult dictated IMP1 65 yo male controlled HIV, acute chst pain, fever    REc await cxs, check in fluenza, hold on antibiotics

## 2017-01-06 NOTE — PROVIDER NOTIFICATION
Paged dr Tolliver: T 102.5, tylenol given and ice packed.  O2 was 82% on 2L, advanced to 10L on oximyzer now 96%.  , other VS stable. Please advise is additional interventions needed. Will monitor.

## 2017-01-06 NOTE — PROVIDER NOTIFICATION
Got a call from the lab asking for clarification of blood prepare order.  It's indicated on pt chart that he requires irradiated blood however according to lab the prepare order is for non-irradiated blood.  Paged dr key to clarify order.      MD also made aware of HGB change this am from 6.2 to 6.7.

## 2017-01-07 ENCOUNTER — APPOINTMENT (OUTPATIENT)
Dept: GENERAL RADIOLOGY | Facility: CLINIC | Age: 67
DRG: 286 | End: 2017-01-07
Attending: INTERNAL MEDICINE
Payer: MEDICARE

## 2017-01-07 ENCOUNTER — ANESTHESIA EVENT (OUTPATIENT)
Dept: INTENSIVE CARE | Facility: CLINIC | Age: 67
DRG: 286 | End: 2017-01-07
Payer: MEDICARE

## 2017-01-07 ENCOUNTER — ANESTHESIA (OUTPATIENT)
Dept: INTENSIVE CARE | Facility: CLINIC | Age: 67
DRG: 286 | End: 2017-01-07
Payer: MEDICARE

## 2017-01-07 PROBLEM — J18.9 BILATERAL PNEUMONIA: Status: ACTIVE | Noted: 2017-01-07

## 2017-01-07 LAB
ABO + RH BLD: NORMAL
ABO + RH BLD: NORMAL
ALBUMIN SERPL-MCNC: 2.6 G/DL (ref 3.4–5)
ALP SERPL-CCNC: 57 U/L (ref 40–150)
ALT SERPL W P-5'-P-CCNC: 16 U/L (ref 0–70)
ANION GAP SERPL CALCULATED.3IONS-SCNC: 10 MMOL/L (ref 3–14)
AST SERPL W P-5'-P-CCNC: 20 U/L (ref 0–45)
BACTERIA SPEC CULT: NORMAL
BASE DEFICIT BLDA-SCNC: 0.4 MMOL/L
BASOPHILS # BLD AUTO: 0 10E9/L (ref 0–0.2)
BASOPHILS NFR BLD AUTO: 0.2 %
BILIRUB SERPL-MCNC: 0.6 MG/DL (ref 0.2–1.3)
BLD GP AB SCN SERPL QL: NORMAL
BLD PROD TYP BPU: NORMAL
BLD UNIT ID BPU: 0
BLD UNIT ID BPU: 0
BLOOD BANK CMNT PATIENT-IMP: NORMAL
BLOOD PRODUCT CODE: NORMAL
BLOOD PRODUCT CODE: NORMAL
BPU ID: NORMAL
BPU ID: NORMAL
BUN SERPL-MCNC: 53 MG/DL (ref 7–30)
CALCIUM SERPL-MCNC: 8.3 MG/DL (ref 8.5–10.1)
CHLORIDE SERPL-SCNC: 97 MMOL/L (ref 94–109)
CO2 SERPL-SCNC: 26 MMOL/L (ref 20–32)
CREAT SERPL-MCNC: 7.81 MG/DL (ref 0.66–1.25)
DIFFERENTIAL METHOD BLD: ABNORMAL
EOSINOPHIL # BLD AUTO: 0 10E9/L (ref 0–0.7)
EOSINOPHIL NFR BLD AUTO: 0.2 %
ERYTHROCYTE [DISTWIDTH] IN BLOOD BY AUTOMATED COUNT: 15.9 % (ref 10–15)
ERYTHROCYTE [DISTWIDTH] IN BLOOD BY AUTOMATED COUNT: 16.7 % (ref 10–15)
GFR SERPL CREATININE-BSD FRML MDRD: 7 ML/MIN/1.7M2
GLUCOSE BLDC GLUCOMTR-MCNC: 100 MG/DL (ref 70–99)
GLUCOSE BLDC GLUCOMTR-MCNC: 135 MG/DL (ref 70–99)
GLUCOSE BLDC GLUCOMTR-MCNC: 135 MG/DL (ref 70–99)
GLUCOSE BLDC GLUCOMTR-MCNC: 140 MG/DL (ref 70–99)
GLUCOSE BLDC GLUCOMTR-MCNC: 90 MG/DL (ref 70–99)
GLUCOSE SERPL-MCNC: 143 MG/DL (ref 70–99)
GRAM STN SPEC: ABNORMAL
HCO3 BLD-SCNC: 25 MMOL/L (ref 21–28)
HCT VFR BLD AUTO: 17.6 % (ref 40–53)
HCT VFR BLD AUTO: 18.9 % (ref 40–53)
HGB BLD-MCNC: 5.4 G/DL (ref 13.3–17.7)
HGB BLD-MCNC: 6 G/DL (ref 13.3–17.7)
HGB BLD-MCNC: 6.1 G/DL (ref 13.3–17.7)
IMM GRANULOCYTES # BLD: 0 10E9/L (ref 0–0.4)
IMM GRANULOCYTES NFR BLD: 0.5 %
LACTATE SERPL-SCNC: 0.9 MMOL/L (ref 0.4–2)
LYMPHOCYTES # BLD AUTO: 0.7 10E9/L (ref 0.8–5.3)
LYMPHOCYTES NFR BLD AUTO: 11.2 %
MCH RBC QN AUTO: 28.1 PG (ref 26.5–33)
MCH RBC QN AUTO: 28.6 PG (ref 26.5–33)
MCHC RBC AUTO-ENTMCNC: 30.7 G/DL (ref 31.5–36.5)
MCHC RBC AUTO-ENTMCNC: 31.7 G/DL (ref 31.5–36.5)
MCV RBC AUTO: 90 FL (ref 78–100)
MCV RBC AUTO: 92 FL (ref 78–100)
MICRO REPORT STATUS: ABNORMAL
MICRO REPORT STATUS: NORMAL
MONOCYTES # BLD AUTO: 0.7 10E9/L (ref 0–1.3)
MONOCYTES NFR BLD AUTO: 11.9 %
MRSA DNA SPEC QL NAA+PROBE: NORMAL
NEUTROPHILS # BLD AUTO: 4.8 10E9/L (ref 1.6–8.3)
NEUTROPHILS NFR BLD AUTO: 76 %
NRBC # BLD AUTO: 0 10*3/UL
NRBC BLD AUTO-RTO: 0 /100
NUM BPU REQUESTED: 3
O2/TOTAL GAS SETTING VFR VENT: ABNORMAL %
OXYHGB MFR BLD: 93 % (ref 92–100)
PCO2 BLD: 42 MM HG (ref 35–45)
PH BLD: 7.38 PH (ref 7.35–7.45)
PLATELET # BLD AUTO: 76 10E9/L (ref 150–450)
PLATELET # BLD AUTO: 86 10E9/L (ref 150–450)
PO2 BLD: 262 MM HG (ref 80–105)
POTASSIUM SERPL-SCNC: 3.8 MMOL/L (ref 3.4–5.3)
POTASSIUM SERPL-SCNC: 5.5 MMOL/L (ref 3.4–5.3)
PROT SERPL-MCNC: 6.2 G/DL (ref 6.8–8.8)
RBC # BLD AUTO: 1.92 10E12/L (ref 4.4–5.9)
RBC # BLD AUTO: 2.1 10E12/L (ref 4.4–5.9)
SODIUM SERPL-SCNC: 133 MMOL/L (ref 133–144)
SPECIMEN EXP DATE BLD: NORMAL
SPECIMEN SOURCE: ABNORMAL
SPECIMEN SOURCE: NORMAL
SPECIMEN SOURCE: NORMAL
TRANSFUSION STATUS PATIENT QL: NORMAL
WBC # BLD AUTO: 3.9 10E9/L (ref 4–11)
WBC # BLD AUTO: 6.2 10E9/L (ref 4–11)

## 2017-01-07 PROCEDURE — 25000125 ZZHC RX 250: Performed by: INTERNAL MEDICINE

## 2017-01-07 PROCEDURE — 20000003 ZZH R&B ICU

## 2017-01-07 PROCEDURE — 82805 BLOOD GASES W/O2 SATURATION: CPT | Performed by: INTERNAL MEDICINE

## 2017-01-07 PROCEDURE — 94002 VENT MGMT INPAT INIT DAY: CPT

## 2017-01-07 PROCEDURE — 85018 HEMOGLOBIN: CPT | Performed by: INTERNAL MEDICINE

## 2017-01-07 PROCEDURE — A9270 NON-COVERED ITEM OR SERVICE: HCPCS | Mod: GY | Performed by: INTERNAL MEDICINE

## 2017-01-07 PROCEDURE — 90937 HEMODIALYSIS REPEATED EVAL: CPT

## 2017-01-07 PROCEDURE — 80053 COMPREHEN METABOLIC PANEL: CPT | Performed by: INTERNAL MEDICINE

## 2017-01-07 PROCEDURE — 40000671 ZZH STATISTIC ANESTHESIA CASE

## 2017-01-07 PROCEDURE — 40000275 ZZH STATISTIC RCP TIME EA 10 MIN

## 2017-01-07 PROCEDURE — 31500 INSERT EMERGENCY AIRWAY: CPT

## 2017-01-07 PROCEDURE — 40000940 XR CHEST PORT 1 VW

## 2017-01-07 PROCEDURE — 25000125 ZZHC RX 250

## 2017-01-07 PROCEDURE — 25000128 H RX IP 250 OP 636: Performed by: INTERNAL MEDICINE

## 2017-01-07 PROCEDURE — 5A1945Z RESPIRATORY VENTILATION, 24-96 CONSECUTIVE HOURS: ICD-10-PCS | Performed by: NURSE ANESTHETIST, CERTIFIED REGISTERED

## 2017-01-07 PROCEDURE — 85027 COMPLETE CBC AUTOMATED: CPT | Performed by: INTERNAL MEDICINE

## 2017-01-07 PROCEDURE — 63400005 ZZH RX 634: Performed by: INTERNAL MEDICINE

## 2017-01-07 PROCEDURE — 25000132 ZZH RX MED GY IP 250 OP 250 PS 637: Mod: GY | Performed by: INTERNAL MEDICINE

## 2017-01-07 PROCEDURE — 99233 SBSQ HOSP IP/OBS HIGH 50: CPT | Performed by: INTERNAL MEDICINE

## 2017-01-07 PROCEDURE — 99291 CRITICAL CARE FIRST HOUR: CPT | Performed by: ANESTHESIOLOGY

## 2017-01-07 PROCEDURE — 87070 CULTURE OTHR SPECIMN AEROBIC: CPT | Performed by: ANESTHESIOLOGY

## 2017-01-07 PROCEDURE — 85025 COMPLETE CBC W/AUTO DIFF WBC: CPT | Performed by: INTERNAL MEDICINE

## 2017-01-07 PROCEDURE — 36600 WITHDRAWAL OF ARTERIAL BLOOD: CPT

## 2017-01-07 PROCEDURE — P9041 ALBUMIN (HUMAN),5%, 50ML: HCPCS | Performed by: INTERNAL MEDICINE

## 2017-01-07 PROCEDURE — 25000125 ZZHC RX 250: Performed by: ANESTHESIOLOGY

## 2017-01-07 PROCEDURE — 25000125 ZZHC RX 250: Performed by: NURSE ANESTHETIST, CERTIFIED REGISTERED

## 2017-01-07 PROCEDURE — 36569 INSJ PICC 5 YR+ W/O IMAGING: CPT

## 2017-01-07 PROCEDURE — 00000146 ZZHCL STATISTIC GLUCOSE BY METER IP

## 2017-01-07 PROCEDURE — P9040 RBC LEUKOREDUCED IRRADIATED: HCPCS | Performed by: INTERNAL MEDICINE

## 2017-01-07 PROCEDURE — 27210509 ZZH TRAY VALVED DOUBLE LUMEN

## 2017-01-07 PROCEDURE — 84132 ASSAY OF SERUM POTASSIUM: CPT | Performed by: INTERNAL MEDICINE

## 2017-01-07 PROCEDURE — 87205 SMEAR GRAM STAIN: CPT | Performed by: ANESTHESIOLOGY

## 2017-01-07 PROCEDURE — 83605 ASSAY OF LACTIC ACID: CPT | Performed by: INTERNAL MEDICINE

## 2017-01-07 RX ORDER — FENTANYL CITRATE 50 UG/ML
25-50 INJECTION, SOLUTION INTRAMUSCULAR; INTRAVENOUS
Status: DISCONTINUED | OUTPATIENT
Start: 2017-01-07 | End: 2017-01-19

## 2017-01-07 RX ORDER — ACETAMINOPHEN 10 MG/ML
1000 INJECTION, SOLUTION INTRAVENOUS EVERY 6 HOURS PRN
Status: DISCONTINUED | OUTPATIENT
Start: 2017-01-07 | End: 2017-01-08

## 2017-01-07 RX ORDER — ALBUMIN, HUMAN INJ 5% 5 %
250 SOLUTION INTRAVENOUS
Status: DISCONTINUED | OUTPATIENT
Start: 2017-01-07 | End: 2017-01-07

## 2017-01-07 RX ORDER — LIDOCAINE 40 MG/G
CREAM TOPICAL
Status: DISCONTINUED | OUTPATIENT
Start: 2017-01-07 | End: 2017-01-22 | Stop reason: HOSPADM

## 2017-01-07 RX ORDER — IPRATROPIUM BROMIDE AND ALBUTEROL SULFATE 2.5; .5 MG/3ML; MG/3ML
3 SOLUTION RESPIRATORY (INHALATION) EVERY 4 HOURS PRN
Status: DISCONTINUED | OUTPATIENT
Start: 2017-01-07 | End: 2017-01-09

## 2017-01-07 RX ORDER — PROPOFOL 10 MG/ML
INJECTION, EMULSION INTRAVENOUS PRN
Status: DISCONTINUED | OUTPATIENT
Start: 2017-01-07 | End: 2017-01-07

## 2017-01-07 RX ORDER — HEPARIN SODIUM,PORCINE 10 UNIT/ML
2-5 VIAL (ML) INTRAVENOUS
Status: DISCONTINUED | OUTPATIENT
Start: 2017-01-07 | End: 2017-01-22 | Stop reason: HOSPADM

## 2017-01-07 RX ORDER — PROPOFOL 10 MG/ML
5-75 INJECTION, EMULSION INTRAVENOUS CONTINUOUS
Status: DISCONTINUED | OUTPATIENT
Start: 2017-01-07 | End: 2017-01-11

## 2017-01-07 RX ORDER — GABAPENTIN 250 MG/5ML
300 SOLUTION ORAL EVERY MORNING
Status: DISCONTINUED | OUTPATIENT
Start: 2017-01-08 | End: 2017-01-09

## 2017-01-07 RX ORDER — GABAPENTIN 250 MG/5ML
600 SOLUTION ORAL EVERY EVENING
Status: DISCONTINUED | OUTPATIENT
Start: 2017-01-07 | End: 2017-01-09

## 2017-01-07 RX ORDER — DOPAMINE HYDROCHLORIDE 160 MG/100ML
2-20 INJECTION, SOLUTION INTRAVENOUS CONTINUOUS
Status: DISCONTINUED | OUTPATIENT
Start: 2017-01-07 | End: 2017-01-09

## 2017-01-07 RX ORDER — DEXTROSE MONOHYDRATE 25 G/50ML
25-50 INJECTION, SOLUTION INTRAVENOUS
Status: DISCONTINUED | OUTPATIENT
Start: 2017-01-07 | End: 2017-01-07

## 2017-01-07 RX ORDER — SODIUM CHLORIDE 9 MG/ML
INJECTION, SOLUTION INTRAVENOUS CONTINUOUS
Status: DISCONTINUED | OUTPATIENT
Start: 2017-01-07 | End: 2017-01-19

## 2017-01-07 RX ORDER — DIPHENHYDRAMINE HYDROCHLORIDE 50 MG/ML
25 INJECTION INTRAMUSCULAR; INTRAVENOUS ONCE
Status: COMPLETED | OUTPATIENT
Start: 2017-01-07 | End: 2017-01-07

## 2017-01-07 RX ORDER — PROPOFOL 10 MG/ML
10-20 INJECTION, EMULSION INTRAVENOUS EVERY 30 MIN PRN
Status: DISCONTINUED | OUTPATIENT
Start: 2017-01-07 | End: 2017-01-11

## 2017-01-07 RX ORDER — NALOXONE HYDROCHLORIDE 0.4 MG/ML
.1-.4 INJECTION, SOLUTION INTRAMUSCULAR; INTRAVENOUS; SUBCUTANEOUS
Status: DISCONTINUED | OUTPATIENT
Start: 2017-01-07 | End: 2017-01-09

## 2017-01-07 RX ORDER — MAGNESIUM SULFATE HEPTAHYDRATE 40 MG/ML
4 INJECTION, SOLUTION INTRAVENOUS EVERY 4 HOURS PRN
Status: DISCONTINUED | OUTPATIENT
Start: 2017-01-07 | End: 2017-01-22 | Stop reason: HOSPADM

## 2017-01-07 RX ORDER — NICOTINE POLACRILEX 4 MG
15-30 LOZENGE BUCCAL
Status: DISCONTINUED | OUTPATIENT
Start: 2017-01-07 | End: 2017-01-07

## 2017-01-07 RX ORDER — GLYCOPYRROLATE 0.2 MG/ML
INJECTION, SOLUTION INTRAMUSCULAR; INTRAVENOUS PRN
Status: DISCONTINUED | OUTPATIENT
Start: 2017-01-07 | End: 2017-01-07

## 2017-01-07 RX ADMIN — HYDROCORTISONE SODIUM SUCCINATE 50 MG: 100 INJECTION, POWDER, FOR SOLUTION INTRAMUSCULAR; INTRAVENOUS at 23:52

## 2017-01-07 RX ADMIN — MIDAZOLAM 2 MG: 1 INJECTION INTRAMUSCULAR; INTRAVENOUS at 08:51

## 2017-01-07 RX ADMIN — CLOPIDOGREL 75 MG: 75 TABLET, FILM COATED ORAL at 21:12

## 2017-01-07 RX ADMIN — DIPHENHYDRAMINE HYDROCHLORIDE 25 MG: 50 INJECTION, SOLUTION INTRAMUSCULAR; INTRAVENOUS at 23:48

## 2017-01-07 RX ADMIN — ACETAMINOPHEN 1000 MG: 10 INJECTION, SOLUTION INTRAVENOUS at 22:35

## 2017-01-07 RX ADMIN — PROPOFOL 100 MG: 10 INJECTION, EMULSION INTRAVENOUS at 02:43

## 2017-01-07 RX ADMIN — MIDAZOLAM 2 MG: 1 INJECTION INTRAMUSCULAR; INTRAVENOUS at 03:30

## 2017-01-07 RX ADMIN — SUCCINYLCHOLINE CHLORIDE 100 MG: 20 INJECTION, SOLUTION INTRAMUSCULAR; INTRAVENOUS at 02:44

## 2017-01-07 RX ADMIN — ATROPINE SULFATE 0.5 MG: 0.1 INJECTION, SOLUTION ENDOTRACHEAL; INTRAMUSCULAR; INTRAVENOUS; SUBCUTANEOUS at 02:55

## 2017-01-07 RX ADMIN — PROPOFOL 5 MCG/KG/MIN: 10 INJECTION, EMULSION INTRAVENOUS at 09:02

## 2017-01-07 RX ADMIN — SODIUM CHLORIDE, PRESERVATIVE FREE: 5 INJECTION INTRAVENOUS at 21:08

## 2017-01-07 RX ADMIN — MIDAZOLAM 2 MG: 1 INJECTION INTRAMUSCULAR; INTRAVENOUS at 07:30

## 2017-01-07 RX ADMIN — SODIUM CHLORIDE 250 ML: 9 INJECTION, SOLUTION INTRAVENOUS at 08:04

## 2017-01-07 RX ADMIN — GABAPENTIN 600 MG: 250 SOLUTION ORAL at 21:09

## 2017-01-07 RX ADMIN — ROCURONIUM BROMIDE 5 MG: 10 INJECTION INTRAVENOUS at 02:43

## 2017-01-07 RX ADMIN — DOPAMINE HYDROCHLORIDE 5 MCG/KG/MIN: 160 INJECTION, SOLUTION INTRAVENOUS at 03:10

## 2017-01-07 RX ADMIN — ABACAVIR 600 MG: 300 TABLET ORAL at 21:30

## 2017-01-07 RX ADMIN — HYDROMORPHONE HYDROCHLORIDE 0.5 MG: 1 INJECTION, SOLUTION INTRAMUSCULAR; INTRAVENOUS; SUBCUTANEOUS at 21:57

## 2017-01-07 RX ADMIN — SODIUM CHLORIDE 1000 ML: 9 INJECTION, SOLUTION INTRAVENOUS at 08:02

## 2017-01-07 RX ADMIN — DARUNAVIR 800 MG: 800 TABLET, FILM COATED ORAL at 21:28

## 2017-01-07 RX ADMIN — TAZOBACTAM SODIUM AND PIPERACILLIN SODIUM 2.25 G: 250; 2 INJECTION, SOLUTION INTRAVENOUS at 08:12

## 2017-01-07 RX ADMIN — MAGNESIUM OXIDE TAB 400 MG (241.3 MG ELEMENTAL MG) 400 MG: 400 (241.3 MG) TAB at 22:45

## 2017-01-07 RX ADMIN — TAZOBACTAM SODIUM AND PIPERACILLIN SODIUM 2.25 G: 250; 2 INJECTION, SOLUTION INTRAVENOUS at 00:38

## 2017-01-07 RX ADMIN — PANTOPRAZOLE SODIUM 40 MG: 40 INJECTION, POWDER, FOR SOLUTION INTRAVENOUS at 14:57

## 2017-01-07 RX ADMIN — Medication 3 ML: at 03:34

## 2017-01-07 RX ADMIN — DAPSONE 100 MG: 25 TABLET ORAL at 21:19

## 2017-01-07 RX ADMIN — ATORVASTATIN CALCIUM 40 MG: 40 TABLET, FILM COATED ORAL at 21:12

## 2017-01-07 RX ADMIN — GLYCOPYRROLATE 0.2 MG: 0.2 INJECTION, SOLUTION INTRAMUSCULAR; INTRAVENOUS at 02:43

## 2017-01-07 RX ADMIN — MYCOPHENOLATE MOFETIL 500 MG: 250 CAPSULE ORAL at 21:25

## 2017-01-07 RX ADMIN — ASPIRIN 81 MG 81 MG: 81 TABLET ORAL at 21:12

## 2017-01-07 RX ADMIN — PROPOFOL 35 MCG/KG/MIN: 10 INJECTION, EMULSION INTRAVENOUS at 22:44

## 2017-01-07 RX ADMIN — RITONAVIR 100 MG: 100 TABLET, FILM COATED ORAL at 21:29

## 2017-01-07 RX ADMIN — MIDAZOLAM 2 MG: 1 INJECTION INTRAMUSCULAR; INTRAVENOUS at 04:38

## 2017-01-07 RX ADMIN — VANCOMYCIN HYDROCHLORIDE 1750 MG: 1 INJECTION, POWDER, LYOPHILIZED, FOR SOLUTION INTRAVENOUS at 04:37

## 2017-01-07 RX ADMIN — TAZOBACTAM SODIUM AND PIPERACILLIN SODIUM 2.25 G: 250; 2 INJECTION, SOLUTION INTRAVENOUS at 14:08

## 2017-01-07 RX ADMIN — ROCURONIUM BROMIDE 25 MG: 10 INJECTION INTRAVENOUS at 02:45

## 2017-01-07 RX ADMIN — HYDROMORPHONE HYDROCHLORIDE 0.5 MG: 1 INJECTION, SOLUTION INTRAMUSCULAR; INTRAVENOUS; SUBCUTANEOUS at 13:13

## 2017-01-07 RX ADMIN — CHLORHEXIDINE GLUCONATE 15 ML: 1.2 RINSE ORAL at 21:41

## 2017-01-07 RX ADMIN — EPINEPHRINE 0.5 MG: 1 INJECTION, SOLUTION INTRAMUSCULAR; SUBCUTANEOUS at 03:07

## 2017-01-07 RX ADMIN — PROPOFOL 25 MCG/KG/MIN: 10 INJECTION, EMULSION INTRAVENOUS at 16:13

## 2017-01-07 RX ADMIN — TAZOBACTAM SODIUM AND PIPERACILLIN SODIUM 2.25 G: 250; 2 INJECTION, SOLUTION INTRAVENOUS at 21:23

## 2017-01-07 RX ADMIN — LEVOFLOXACIN 750 MG: 5 INJECTION, SOLUTION INTRAVENOUS at 01:26

## 2017-01-07 RX ADMIN — ATROPINE SULFATE 0.5 MG: 0.1 INJECTION, SOLUTION ENDOTRACHEAL; INTRAMUSCULAR; INTRAVENOUS; SUBCUTANEOUS at 03:01

## 2017-01-07 RX ADMIN — CHLORHEXIDINE GLUCONATE 15 ML: 1.2 RINSE ORAL at 08:53

## 2017-01-07 RX ADMIN — ERYTHROPOIETIN 10000 UNITS: 10000 INJECTION, SOLUTION INTRAVENOUS; SUBCUTANEOUS at 09:12

## 2017-01-07 NOTE — PLAN OF CARE
Problem: Goal Outcome Summary  Goal: Goal Outcome Summary  Outcome: No Change  ICU End of Shift Summary.  For vital signs and complete assessments, please see documentation flowsheets.     Pertinent assessments: Patient remains intubated and sedated. Dialysis done at bedside, patient tolerated well. No urine output- on hemodialysis. Dopamine weaned off. Bilat wrist restraints for safety. Sputum sent to lab for culture.    Major Shift Events: Daughter states that patient always has medical problems with blood transfusions alone but not when he is getting dialysis. Daughter was wondering if he should only blood during dialysis to prevent problems  Plan (Upcoming Events): continue to monitor  Discharge/Transfer Needs: to be determined    Bedside Shift Report Completed yes  Bedside Safety Check Completed yes

## 2017-01-07 NOTE — PROGRESS NOTES
Renal Medicine Progress Note            Assessment/Plan:     1) Respiratory Failure:  Due to pneumonia.  Cx unrevealing so far.  On broad spectrum abx.      2) Septic Shock:  Also due to pneumonia.  Stabilizing.    3) HIV:  On HAART.    4) ESRD: HD earlier today.  2 L removed.  K 2 bath.  Good access function.    5) Anemia:  HGB only 5.4.  Given 1 unit RBCs. EPO 10K units.      Plan:    Next HD typically Tuesday.  Eval day by day for indications.         Interval History:   Unable.  Pt is intubated/sedated.  Respiratory distress overnight.  Intubated.   Became bradycardic but did not lose his pulse.  Supported on vent and dopamine.  Now off dopamine.  Still sedated on vent. FIO2 50%.  Had HD this AM.  2 L fluid removed.       Medications and Allergies:       insulin aspart  1-6 Units Subcutaneous Q4H     sodium chloride (PF)  10 mL Intracatheter Q7 Days     pantoprazole  40 mg Intravenous QAM AC     piperacillin-tazobactam  2.25 g Intravenous Q6H     vancomycin place koenig - receiving intermittent dosing  1 each Does not apply See Admin Instructions     [START ON 1/8/2017] levofloxacin  500 mg Intravenous Q48H     - MEDICATION INSTRUCTIONS for Dialysis Patients -   Does not apply See Admin Instructions     iopamidol  100 mL Intravenous Once     sodium chloride (PF)  3 mL Intracatheter Q8H     abacavir  600 mg Oral QPM     aspirin chewable tablet 81 mg  81 mg Oral QPM     atorvastatin  40 mg Oral At Bedtime     calcium acetate  2,001 mg Oral TID w/meals     chlorhexidine  15 mL Swish & Spit BID     clopidogrel (PLAVIX) tablet 75 mg  75 mg Oral QPM     dapsone  100 mg Oral At Bedtime     darunavir  800 mg Oral At Bedtime     dolutegravir  50 mg Oral At Bedtime     gabapentin  300 mg Oral QAM     gabapentin  600 mg Oral QPM     magnesium oxide (MAG-OX) tablet 400 mg  400 mg Oral At Bedtime     mycophenolate  500 mg Oral BID     ritonavir  100 mg Oral At Bedtime        Allergies   Allergen Reactions     Lisinopril   "    Sulfa Drugs             Physical Exam:   Vitals were reviewed  /48 mmHg  Pulse 94  Temp(Src) 97.5  F (36.4  C) (Axillary)  Resp 14  Ht 1.803 m (5' 11\")  Wt 88.2 kg (194 lb 7.1 oz)  BMI 27.13 kg/m2  SpO2 98%    Wt Readings from Last 3 Encounters:   01/07/17 88.2 kg (194 lb 7.1 oz)   11/28/16 88.905 kg (196 lb)   11/28/16 86.183 kg (190 lb)       Intake/Output Summary (Last 24 hours) at 01/07/17 1528  Last data filed at 01/07/17 1409   Gross per 24 hour   Intake    100 ml   Output      0 ml   Net    100 ml       GENERAL APPEARANCE: intubated, sedated  HEENT:  Eyes/ears/nose/neck grossly normal  RESP: + vent sounds, lungs cta b c good efforts, no crackles, rhonchi or wheezes  CV: RRR, nl S1/S2, no m/r/g   ABDOMEN: o/s/nt/nd, bs present  EXTREMITIES/SKIN: no c/c/rashes/lesions; no edema  NEURO:  sedated           Data:     BMP  Recent Labs  Lab 01/07/17  1411 01/07/17  0530 01/05/17 2000 01/05/17  0644  01/03/17  1245   NA  --  133 137 134  --  133   POTASSIUM 3.8 5.5* 3.9 4.5  < > 3.3*   CHLORIDE  --  97 98 96  --  93*   PRABHA  --  8.3* 8.1* 8.6  --  9.0   CO2  --  26 29 27  --  30   BUN  --  53* 29 67*  --  27   CR  --  7.81* 5.01* 9.14*  --  4.37*   GLC  --  143* 105* 166*  --  164*   < > = values in this interval not displayed.  CBC  Recent Labs  Lab 01/07/17  1411 01/07/17 0530 01/06/17  0619 01/06/17 01/05/17 2000 01/05/17  0644   WBC  --  6.2 6.4  --  5.0 5.4   HGB 6.1* 5.4* 6.7* 6.2* 6.2* 6.2*   HCT  --  17.6* 21.7*  --  19.9* 19.7*   MCV  --  92 92  --  91 90   PLT  --  76* 82*  --  76* 77*     Lab Results   Component Value Date    AST 20 01/07/2017    ALT 16 01/07/2017    ALKPHOS 57 01/07/2017    BILITOTAL 0.6 01/07/2017    BILICONJ 0.0 07/06/2014     Lab Results   Component Value Date    INR 0.99 08/15/2016       Attestation:  I have reviewed today's vital signs, notes, medications, labs and imaging.    Rudolph Wilkes MD  [facility] - Nephrology  [phone]    "

## 2017-01-07 NOTE — PROGRESS NOTES
Pt dropped HR down to 28-33;agonal breathing;  Atropine x1 given and HR rebound to 60s-70s;  Dr Castillo/Dr Jhaveri at bedside; orders to intubate;  Tele Hub on camera;  Intubated 8.0 24 at lip; Dopamine started at 5mcg/kig/min; pressure and HR stable

## 2017-01-07 NOTE — PROGRESS NOTES
[facility] Intensive Care Progress Note    Date of Service (when I saw the patient): 01/07/2017     Assessment and Plan  Donald Raza is a 66 year old male with complex medical hx of HAART for hx of HIV, ESRD on HD, CAD, Hypertension, DM type 2 on insulin, chronic anemia  admitted on 1/3/2017 with episodes of chest pain and was determined non cardiac in etiology given recent heart cath findings. He was admitted to ICU for respiratory failure requiring intubation secondary to pneumonia.       Neurology:  Sedated; agitated and non-focal with light sedation.   Plan: Minimize sedation, monitor.     Cardiovascular:  Septic shock requiring vasopressor. H/o CAD and multiple PCI. ECG unremarkable. He was started on Dopamine in setting of episode of bradycardia.   Plan: Wean pressor as tolerated. TTE.     Pulmonary:        Hypoxemic respiratory failure requiring intubation on 1/7 most likely sec to PNA: febrile, bilateral LL infiltrates.   Plan: On broad spectrum ABX. Trach aspirate culture.       Ventilation Mode: CMV/AC  FiO2 (%): 60 %  Rate Set (breaths/minute): 14 breaths/min  Tidal Volume Set (mL): 550 mL  PEEP (cm H2O): 5 cmH2O  Oxygen Concentration (%): 60 %  Resp: 16    Renal  Hyperkalemia. H/o ESRD on HD.  Plan: dialysis today.     ID:         New septic shock: etiology most likely PNA. H/O HIV on antiretroviral therapy. Pan-cultured.   Plan: Continue broad spectrum antibiotics.     GI  No concerns.    Nutrition  Malnutrition.  Plan: If not extubated by tomorrow will place TF.     Endocrine:  H/o DM type II.   Plan: Monitor and treat hyperglycemia.     Heme/Onc:  Acute on chronic anemia. Thrombocytopenia. No clear source of bleeding.  Plan: 1 PRBC. Repeat Hgb. Hgb goal >7.     DVT Prophylaxis: Pneumatic Compression Devices  GI Prophylaxis: PPI    Restraints: Restraints for medical healing needed: YES    Family update by me today: No    Elodia Flores    Time Spent on This  Encounter  Billing:  I spent 45 minutes bedside and on the inpatient unit today managing the critical care of Donald Raza in relation to the issues listed in this note.    Main Plans for Today  Hemodialysis, PRBC transfusion, monitor CBC, TTE.       Physical Exam  Temp: 97.5  F (36.4  C) Temp src: Axillary Temp  Min: 97.2  F (36.2  C)  Max: 102.6  F (39.2  C) BP: 142/59 mmHg Pulse: 94 Heart Rate: 80 Resp: 16 SpO2: 95 % O2 Device: Mechanical Ventilator Oxygen Delivery: 15 LPM  Filed Vitals:    01/06/17 2200 01/07/17 0500 01/07/17 0745   Weight: 89 kg (196 lb 3.4 oz) 88.2 kg (194 lb 7.1 oz) 88.2 kg (194 lb 7.1 oz)     I/O last 3 completed shifts:  In: 315 [P.O.:315]  Out: -     Neurologic: sedated, grossly non-focal.   Cardiovascular: RRR, no murmurs and gallops, pulses present all 4 extremities.   Respiratory: breath sounds course bl, decrease bl LL.   GI: soft, non-tender, non-distended.   Skin/Extremities: pale, no deformities or edema.   Lines: No erythema or discharge at entry site for PICC Line  Current lines are required for patient management    Medications    propofol (DIPRIVAN) infusion 20 mcg/kg/min (01/07/17 1200)     DOPamine 1 mcg/kg/min (01/07/17 1313)     NaCl 10 mL/hr at 01/07/17 1312       insulin aspart  1-6 Units Subcutaneous Q4H     sodium chloride (PF)  10 mL Intracatheter Q7 Days     piperacillin-tazobactam  2.25 g Intravenous Q6H     vancomycin place koenig - receiving intermittent dosing  1 each Does not apply See Admin Instructions     [START ON 1/8/2017] levofloxacin  500 mg Intravenous Q48H     - MEDICATION INSTRUCTIONS for Dialysis Patients -   Does not apply See Admin Instructions     iopamidol  100 mL Intravenous Once     sodium chloride (PF)  3 mL Intracatheter Q8H     abacavir  600 mg Oral QPM     aspirin chewable tablet 81 mg  81 mg Oral QPM     atorvastatin  40 mg Oral At Bedtime     calcium acetate  2,001 mg Oral TID w/meals     chlorhexidine  15 mL Swish & Spit BID      clopidogrel (PLAVIX) tablet 75 mg  75 mg Oral QPM     dapsone  100 mg Oral At Bedtime     darunavir  800 mg Oral At Bedtime     dolutegravir  50 mg Oral At Bedtime     gabapentin  300 mg Oral QAM     gabapentin  600 mg Oral QPM     magnesium oxide (MAG-OX) tablet 400 mg  400 mg Oral At Bedtime     mycophenolate  500 mg Oral BID     ritonavir  100 mg Oral At Bedtime       Data    Recent Labs  Lab 01/07/17  0530 01/06/17  0619 01/06/17 01/05/17 2000 01/05/17  0644  01/03/17  2112 01/03/17  1710 01/03/17  1245   WBC 6.2 6.4  --  5.0 5.4  --   --   --  9.0   HGB 5.4* 6.7* 6.2* 6.2* 6.2*  --   --   --  7.5*   MCV 92 92  --  91 90  --   --   --  88   PLT 76* 82*  --  76* 77*  --   --   --  139*     --   --  137 134  --   --   --  133   POTASSIUM 5.5*  --   --  3.9 4.5  < >  --   --  3.3*   CHLORIDE 97  --   --  98 96  --   --   --  93*   CO2 26  --   --  29 27  --   --   --  30   BUN 53*  --   --  29 67*  --   --   --  27   CR 7.81*  --   --  5.01* 9.14*  --   --   --  4.37*   ANIONGAP 10  --   --  10 11  --   --   --  10   PRABHA 8.3*  --   --  8.1* 8.6  --   --   --  9.0   *  --   --  105* 166*  --   --   --  164*   ALBUMIN 2.6*  --   --   --   --   --   --   --   --    PROTTOTAL 6.2*  --   --   --   --   --   --   --   --    BILITOTAL 0.6  --   --   --   --   --   --   --   --    ALKPHOS 57  --   --   --   --   --   --   --   --    ALT 16  --   --   --   --   --   --   --   --    AST 20  --   --   --   --   --   --   --   --    TROPI  --   --   --   --   --   --  <0.015The 99th percentile for upper reference range is 0.045 ug/L.  Troponin values in the range of 0.045 - 0.120 ug/L may be associated with risks of adverse clinical events. <0.015The 99th percentile for upper reference range is 0.045 ug/L.  Troponin values in the range of 0.045 - 0.120 ug/L may be associated with risks of adverse clinical events. <0.015The 99th percentile for upper reference range is 0.045 ug/L.  Troponin values in the range of  0.045 - 0.120 ug/L may be associated with risks of adverse clinical events.   < > = values in this interval not displayed.  Recent Results (from the past 24 hour(s))   CT Chest/Abdomen/Pelvis w Contrast    Narrative    CT CHEST/ABDOMEN/PELVIS WITH CONTRAST   1/6/2017 8:48 PM     HISTORY: Shortness of breath.    TECHNIQUE: 94mL Isovue -370. Radiation dose for this scan was reduced  using automated exposure control, adjustment of the mA and/or kV  according to patient size, or iterative reconstruction technique.    COMPARISON: 8/16/2016    FINDINGS:   Chest: There is a 1 cm right thyroid nodule, also present previously.  In a patient without known thyroid disease, an incidental thyroid  nodule without microcalcifications measuring <1.0 cm in size is most  likely benign and does not typically require follow-up. There is  minimal mediastinal adenopathy. No pleural effusion. There is  consolidative infiltrate within both lower lobes and to a lesser  degree within both upper lobes. The overall amount is moderate. Due to  the presence of some nodularity, I would recommend short-term  follow-up after therapy to confirm resolution and the absence of  underlying nodules.    Abdomen, pelvis: Surgical clips in the right upper quadrant. Nothing  acute with respect to the upper abdominal organs. Mild prostate  enlargement.      Impression    IMPRESSION:  1. Bilateral consolidative infiltrate/pneumonia. Short-term follow-up  recommended.  2. Minimal mediastinal adenopathy.  3. Additional findings discussed above.    WOLFGANG ROGER MD   XR Chest Port 1 View    Narrative    CHEST SINGLE VIEW PORTABLE  1/7/2017 3:06 AM     HISTORY: Endotracheal tube positioning.    COMPARISON: 1/5/2017 at 1948 hours.    FINDINGS: Interval placement of an endotracheal tube with distal tube  tip likely just entering the right main bronchus. Hypoinflated lungs.  Indistinctness of the central pulmonary vasculature. Probable  cardiomegaly.      Impression     IMPRESSION:   1. Interval placement of an endotracheal tube with distal tube tip  likely just entering the right main bronchus.  2. Indistinctness of the central pulmonary vasculature, possibly  relating to interstitial pulmonary edema.    The position of the endotracheal tube was conveyed to Gisela, the  nursing taking care of the patient in the intensive care unit, by Dr. Arreola on 1/7/2017 at 0310 hours.    EN ARREOLA MD   XR Chest Port 1 View    Narrative    CHEST SINGLE VIEW PORTABLE  1/7/2017 4:05 AM     HISTORY: Nurse placed peripherally inserted central catheter.    COMPARISON: 1/7/2017 at 0258 hours.    FINDINGS: Interval placement of a right upper extremity peripherally  inserted central catheter with distal catheter tip likely in the right  atrium, approximately 3 cm distal to the junction of the superior vena  cava and right atrium. Interval pullback of an endotracheal tube with  distal tube tip now in the trachea, approximately 2.5 cm proximal to  the jerry. Interval placement of an enteric drainage tube with distal  tube tip in the gastric fundus. Hypoinflated lungs. Partial  visualization of a vascular stent in the left axilla.       Impression    IMPRESSION:   1. Interval placement of a right upper extremity peripherally inserted  central catheter with distal catheter tip in the right atrium,  approximately 3 cm distal to the junction of the superior vena cava  and right atrium.  2. Interval pullback of an endotracheal tube with distal tube tip now  approximately 2.5 cm proximal to the jerry.  3. Interval placement of an enteric drainage tube with distal tube tip  in the gastric fundus.  4. Hazy opacities in the central aspects of bilateral lungs and left  lung base again noted.    EN ARREOLA MD     The history and the 10 points review of systems are included in the note above.     I spent 35 minutes of critical care time evaluating and managing this patient, discussing with the  consultants and the patient's family.    Elodia Flores MD  Anesthesiology Critical Care Medicine  Pg. [phone]

## 2017-01-07 NOTE — ADDENDUM NOTE
Addendum  created 01/07/17 0308 by Miguel Angel Chery APRN CRNA    Modules edited: Anesthesia Events

## 2017-01-07 NOTE — PROGRESS NOTES
Patient became hypoxic this evening requiring 15 liters of oxygen. He is febrile with temp of 102. CT of the chest/abdomen/pelvis was done and showed evidence of bilateral consolidative infiltrate, suspicious for pneumonia.   Started on Zosyn/vancomycin/Levaquin for HCAP. ID already consulted.   Transferred to ICU for further management. Plan discussed with intensivist(tele ICU).     Addendum: Code blue was called because patient became very dyspneic, respiratory rate dropped. Pulse was palpable. Patient was intubated right away. His heart rate dropped to 40's. He was given atropine 0.5 mg IV twice.

## 2017-01-07 NOTE — PROGRESS NOTES
Pt intubated by Anesthesia for respiratory failure about 0245. Procedure went smoothly and was without incident. 8.0 ETT placement was verified by good CO2 color change, BL BS, and chest x-ray. Tube was initially 24cm @ the lip but after x-ray it was pulled back 2 cm and is now 22cm @ th lip. Pt remains sedated. Pt has coarse BL BS with thick, tan oral secretions. Respiratory will continue to monitor.     Chiquita Oseguera

## 2017-01-07 NOTE — ANESTHESIA CARE TRANSFER NOTE
Patient: Donald C Luz Marina    INTUBATION  Additional Information* No procedures listed *    Diagnosis: * No pre-op diagnosis entered *  Diagnosis Additional Information: No value filed.    Anesthesia Type:   No value filed.     Note:  Airway :ETT  Patient transferred to:ICU  Comments: VSS      Vitals: (Last set prior to Anesthesia Care Transfer)              Electronically Signed By: DEDRICK Queen CRNA  January 7, 2017  3:07 AM

## 2017-01-07 NOTE — PLAN OF CARE
Problem: Restraint for Non-Violent/Non-Self-Destructive Behavior  Goal: Prevent/Manage Potential Problems  Maintain safety of patient and others during period of restraint.  Promote psychological and physical wellbeing.  Prevent injury to skin and involved body parts.  Promote nutrition, hydration, and elimination.   Outcome: No Change  soft restraints restraints continued 1/7/2017    Clinical Justification: Pulling lines, pulling tubes, and pulling equipment  Less Restrictive Alternative: 1:1 patient care, Repositioning, Re-evaluate equipment, Disguise equipment, Pain management, Alarm, De-escalation, Reorientation   ,     New orders placed Yes  Length of Order: 1 Day      Medina Skinner

## 2017-01-07 NOTE — PROGRESS NOTES
XCOVER.  Code Blue  was called and on arrival patient has pulse, but unresponsive and in distress, and developed respiratory failure and intubated. After intubation, he became bradycardic, and 0.  0.5 mg Atropine given. Stopped Propofol and start versed. Tele ICU Managing the patient, input appreciated.  - Called his wife,Jennifer and updated her.  - ABG, CMP, LA, CBC.  - Continue IV antibiotics as ordered.  - PICC line.  - Versed drip for sedation, stop Propofol due to bradycardia.

## 2017-01-07 NOTE — PROGRESS NOTES
[facility]  Hospitalist Progress Note  Tam Fraser MD, MD 01/07/2017  (Text Page)  Reason for Stay (Diagnosis): acute respiratory failure         Assessment and Plan:      Summary of Stay: Donald Raza is a 66 year old male with complex medical hx of HAART for hx of HIV, ESRD on HD, CAD, Hypertension, DM type 2 on insulin, chronic anemia  admitted on 1/3/2017 with episodes of chest pain and was determined non cardiac in etiology given recent heart cath findings. He started having intermittent fever spikes ( similar to previous hospitalizations) and complicated with increasing O2 requirements and eventually led to severe respiratory distress, unresponsiveness and eventually had a CODE blue event and intubation hence this ICU hospitalization.    Problem List:   1. Acute respiratory failure secondary likely to bilateral pneumonia suspect HCAP   2. Fever spikes suspect from #1  3. Worsening anemia on top of chronic anemia  4. ESRD on HD  5. HIV on HAART  6. DM type 2 insulin requiring  7. CAD with recent LHC  8. Hypertension  9. Septic shock requiring vasopressors with earlier fever, findings of HCAP, tachypnea, alteration in mental state  10. Thrombocytopenia likely sepsis related    Continue ICU care.  On broad spectrum Abx started earlier on Zosyn, Levaquin and Vancomycin. Will await further ID recommendations.  Cultures sent earlier and still has no reported growth.  Taper and d/c vasopressors if able.  Transfuse with 1 unit of PRBC today while on hemodialysis and re-check, may need to transfuse more if Hgb Is < 7  Monitor for any bleeding events. Unsure regarding etiology of acute changes on Hgb status. Patient is also on DAPT given CAD.   Resume beta blockers, Nitro, once off vasopressors and BP permitting.  Defer vent management with our intensivist.  Stop IVF.  Physical restraints needed while on the ventilator.    DVT Prophylaxis: Pneumatic Compression Devices  Code Status: Full  "Code  Discharge Dispo: to be determined  Estimated Disch Date / # of Days until Disch: unclear yet as he remained critically ill        Interval History (Subjective):      Assumed care today.  Seen and examined. Poor historian given he is on the ventilator.  Reviewed chart.  He had rapid deterioration earlier and had a code blue due to respiratory distress and unresponsiveness  and subsequently intubated.  No recurrence of any fever spikes, on low dose dopamine with improving hemodynamics with no reported bradycardia.  No bleeding events seen.  Currently tolerating ongoing hemodialysis                  Physical Exam:      Last Vital Signs:  /51 mmHg  Pulse 94  Temp(Src) 97.5  F (36.4  C) (Axillary)  Resp 14  Ht 1.803 m (5' 11\")  Wt 88.2 kg (194 lb 7.1 oz)  BMI 27.13 kg/m2  SpO2 96%    I/O last 3 completed shifts:  In: 315 [P.O.:315]  Out: -   Wt Readings from Last 1 Encounters:   01/07/17 88.2 kg (194 lb 7.1 oz)     Filed Vitals:    01/03/17 1703 01/05/17 1110 01/06/17 2200 01/07/17 0500   Weight: 89.041 kg (196 lb 4.8 oz) 85 kg (187 lb 6.3 oz) 89 kg (196 lb 3.4 oz) 88.2 kg (194 lb 7.1 oz)    01/07/17 0745   Weight: 88.2 kg (194 lb 7.1 oz)       Constitutional: Intubated, sedated not in distress   Respiratory: Fair air entry, bilateral basal crackles   Cardiovascular: Regular rate and rhythm, normal S1 and S2, and no murmur noted   Abdomen: Normal bowel sounds, soft, non-distended, non-tender   Skin: No rashes, no cyanosis, dry to touch   Neuro: Sedated, intubated,   Extremities: picc line on the R UE, no edema on both LE   Other(s): Sedated, not agitated       All other systems: Negative          Medications:      All current medications were reviewed with changes reflected in problem list.         Data:      All new lab and imaging data was reviewed.   Labs:    Recent Labs  Lab 01/05/17 2012 01/05/17  2000   CULT No growth after 2 days No growth after 2 days       Recent Labs  Lab 01/07/17  0430 " 01/06/17  2215   PH 7.38 7.38   PO2 262* 37*   PCO2 42 47*   HCO3 25 28   SARANYA  --  2.5       Recent Labs  Lab 01/07/17  0530 01/05/17 2000 01/05/17  0644    137 134   POTASSIUM 5.5* 3.9 4.5   CHLORIDE 97 98 96   CO2 26 29 27   ANIONGAP 10 10 11   * 105* 166*   BUN 53* 29 67*   CR 7.81* 5.01* 9.14*   GFRESTIMATED 7* 12* 6*   GFRESTBLACK 8* 14* 7*   PRABHA 8.3* 8.1* 8.6       Recent Labs  Lab 01/07/17 0530 01/06/17 0619 01/06/17 01/05/17 2000   WBC 6.2 6.4  --  5.0   HGB 5.4* 6.7* 6.2* 6.2*   HCT 17.6* 21.7*  --  19.9*   MCV 92 92  --  91   PLT 76* 82*  --  76*       Recent Labs  Lab 01/07/17  0804 01/07/17  0530 01/07/17  0459 01/06/17  2318 01/06/17  2112 01/06/17  1738  01/05/17 2000 01/05/17  0644  01/03/17  1245   GLC  --  143*  --   --   --   --   --  105*  --  166*  --  164*   *  --  135* 94 90 101*  < >  --   < >  --   < >  --    < > = values in this interval not displayed.  No results for input(s): INR in the last 168 hours.  No results for input(s): TSH in the last 168 hours.   Imaging:   Results for orders placed or performed during the hospital encounter of 01/03/17   XR Chest 2 Views    Narrative    XR CHEST 2 VW 1/3/2017 1:32 PM    HISTORY: Chest pain and shortness of breath.    COMPARISON: 8/16/2016    FINDINGS: Low lung volume. Vascular congestion and cardiomegaly. No  consolidation. No celsa pleural effusion or pneumothorax.      Impression    IMPRESSION: Cardiomegaly and vascular congestion.    ASHOK SHAIKH MD   XR Chest Port 1 View    Narrative    CHEST ONE VIEW UPRIGHT  1/5/2017  8:13 PM     HISTORY: Abdominal pain and high fever.    COMPARISON: None.      Impression    IMPRESSION: No free air seen underneath the diaphragm. Lungs grossly  clear.    RADHA JORDAN MD   X-ray Abdomen flat port    Narrative    ABDOMEN TWO-THREE VIEW  1/5/2017 8:11 PM     HISTORY: Free air, abdominal pain and fever.    COMPARISON: None.    FINDINGS: The bowel gas pattern is nonspecific. There is  no gross free  air, but evaluation of free air is limited by motion artifact and  positioning. There is no pneumatosis. The lung bases are unremarkable.      Impression    IMPRESSION: Nonspecific bowel gas pattern.     RADHA JORDAN MD   CT Chest/Abdomen/Pelvis w Contrast    Narrative    CT CHEST/ABDOMEN/PELVIS WITH CONTRAST   1/6/2017 8:48 PM     HISTORY: Shortness of breath.    TECHNIQUE: 94mL Isovue -370. Radiation dose for this scan was reduced  using automated exposure control, adjustment of the mA and/or kV  according to patient size, or iterative reconstruction technique.    COMPARISON: 8/16/2016    FINDINGS:   Chest: There is a 1 cm right thyroid nodule, also present previously.  In a patient without known thyroid disease, an incidental thyroid  nodule without microcalcifications measuring <1.0 cm in size is most  likely benign and does not typically require follow-up. There is  minimal mediastinal adenopathy. No pleural effusion. There is  consolidative infiltrate within both lower lobes and to a lesser  degree within both upper lobes. The overall amount is moderate. Due to  the presence of some nodularity, I would recommend short-term  follow-up after therapy to confirm resolution and the absence of  underlying nodules.    Abdomen, pelvis: Surgical clips in the right upper quadrant. Nothing  acute with respect to the upper abdominal organs. Mild prostate  enlargement.      Impression    IMPRESSION:  1. Bilateral consolidative infiltrate/pneumonia. Short-term follow-up  recommended.  2. Minimal mediastinal adenopathy.  3. Additional findings discussed above.    WOLFGANG ROGER MD   XR Chest Port 1 View    Narrative    CHEST SINGLE VIEW PORTABLE  1/7/2017 3:06 AM     HISTORY: Endotracheal tube positioning.    COMPARISON: 1/5/2017 at 1948 hours.    FINDINGS: Interval placement of an endotracheal tube with distal tube  tip likely just entering the right main bronchus. Hypoinflated lungs.  Indistinctness of the  central pulmonary vasculature. Probable  cardiomegaly.      Impression    IMPRESSION:   1. Interval placement of an endotracheal tube with distal tube tip  likely just entering the right main bronchus.  2. Indistinctness of the central pulmonary vasculature, possibly  relating to interstitial pulmonary edema.    The position of the endotracheal tube was conveyed to Gisela, the  nursing taking care of the patient in the intensive care unit, by Dr. Arreola on 1/7/2017 at 0310 hours.    EN ARREOLA MD   XR Chest Port 1 View    Narrative    CHEST SINGLE VIEW PORTABLE  1/7/2017 4:05 AM     HISTORY: Nurse placed peripherally inserted central catheter.    COMPARISON: 1/7/2017 at 0258 hours.    FINDINGS: Interval placement of a right upper extremity peripherally  inserted central catheter with distal catheter tip likely in the right  atrium, approximately 3 cm distal to the junction of the superior vena  cava and right atrium. Interval pullback of an endotracheal tube with  distal tube tip now in the trachea, approximately 2.5 cm proximal to  the jerry. Interval placement of an enteric drainage tube with distal  tube tip in the gastric fundus. Hypoinflated lungs. Partial  visualization of a vascular stent in the left axilla.       Impression    IMPRESSION:   1. Interval placement of a right upper extremity peripherally inserted  central catheter with distal catheter tip in the right atrium,  approximately 3 cm distal to the junction of the superior vena cava  and right atrium.  2. Interval pullback of an endotracheal tube with distal tube tip now  approximately 2.5 cm proximal to the jerry.  3. Interval placement of an enteric drainage tube with distal tube tip  in the gastric fundus.  4. Hazy opacities in the central aspects of bilateral lungs and left  lung base again noted.    EN ARREOLA MD

## 2017-01-07 NOTE — PHARMACY-VANCOMYCIN DOSING SERVICE
Pharmacy Vancomycin Initial Note  Date of Service 2017  Patient's  1950  66 year old, male    Indication: [facility] Pneumonia    Current estimated CrCl = Estimated Creatinine Clearance: 17.8 mL/min (based on Cr of 5.01).    Creatinine for last 3 days  2017:  8:00 PM Creatinine 5.01 mg/dL*;  6:44 AM Creatinine 9.14 mg/dL*    Recent Vancomycin Level(s) for last 3 days  No results found for requested labs within last 3 days.      Vancomycin IV Administrations (past 72 hours)      No vancomycin orders with administrations in past 72 hours.                Nephrotoxins and other renal medications (Future)    Start     Dose/Rate Route Frequency Ordered Stop    17  piperacillin-tazobactam (ZOSYN) infusion 2.25 g     Comments:  Pharmacy can adjust dose based on renal function.    2.25 g  100 mL/hr over 30 Minutes Intravenous EVERY 6 HOURS 17 221  vancomycin (VANCOCIN) 1,750 mg in NaCl 0.9 % 500 mL intermittent infusion      1,750 mg Intravenous ONCE 17 2212      17 2212  vancomycin place koenig - receiving intermittent dosing      1 each Does not apply SEE ADMIN INSTRUCTIONS 17 2212            Contrast Orders - past 72 hours (72h ago through future)    Start     Dose/Rate Route Frequency Ordered Stop    17 2045  iopamidol (ISOVUE-370) solution 500 mL      500 mL Intravenous ONCE 17 1700  iopamidol (ISOVUE-370) solution 100 mL      100 mL Intravenous ONCE 17 1652      17 1700  iopamidol (ISOVUE-370) solution 50 mL      50 mL Intravenous ONCE 17 1652 17 1700                Plan:  1.  Start vancomycin  1750 mg IV intermittent dosing.   2.  Goal Trough Level: 15-20 mg/L   3.  Pharmacy will check trough levels as appropriate in 1-3 Days.    4. Serum creatinine levels will be ordered daily for the first week of therapy and at least twice weekly for subsequent weeks.    5.  Reginald method utilized to dose vancomycin therapy: Method 2    Sola See

## 2017-01-07 NOTE — PROGRESS NOTES
Note Infectious Disease Consult Service Progress Note   Pt Name Donald Raza   Date 01/07/2017   MRN [id number]     Notes / labs / imaging test results and other data were reviewed    CHIEF COMPLAINT: REASON FOR VISIT    VDRF    HPI  65 yo w HIV, ESRD,  Latest labs 12/16/16 (outside lab)  CD4 262    viral load undetectable    On dapsone PJP prophylaxis  On abacavir + darunavir + dolutegravir + ritonavir    On vent for VDRF  On vanco + Zosyn + levofloxacin    Intubated / on empiric therapy / HIV meds continued ...    Pt on vent / sedate    ROS  Pt not able to offer ROS ...     Remainder of 14-point ROS was negative except as listed above    PFSH:  Personal / Family / Social Histories were reviewed and updated  nothing new      CURRENT MED REVIEWD    Prescription Medications as of 1/7/2017             insulin lispro (HUMALOG KWIKPEN) 100 UNIT/ML injection Inject Subcutaneous 3 times daily (before meals) Sliding scale - 2 units per 50 over 150    Multiple Vitamins-Minerals (DIALYVITE 800/ULTRA D) TABS Take 1 tablet by mouth daily    Omega-3 Fatty Acids (OMEGA-3 FISH OIL PO) Take 2 g by mouth 2 times daily (with meals)    mycophenolate (CELLCEPT - GENERIC EQUIVALENT) 500 MG tablet Take 500 mg by mouth 2 times daily On an empty stomach    isosorbide mononitrate (IMDUR) 30 MG 24 hr tablet Take 2 tablets (60 mg) by mouth every evening    hydrALAZINE (APRESOLINE) 50 MG tablet Take 0.5 tablets (25 mg) by mouth 3 times daily    losartan (COZAAR) 100 MG tablet Take 1 tablet (100 mg) by mouth At Bedtime    carvedilol (COREG) 12.5 MG tablet Take 1 tablet (12.5 mg) by mouth 2 times daily (with meals)    amLODIPine (NORVASC) 5 MG tablet Take 1 tablet (5 mg) by mouth At Bedtime    gabapentin (NEURONTIN) 300 MG capsule Take 600 mg by mouth every evening    insulin glargine (LANTUS) 100 UNIT/ML PEN Inject 50 Units Subcutaneous every morning    insulin lispro (HUMALOG KWIKPEN) 100 UNIT/ML soln Inject 15 Units Subcutaneous 3 times  "daily (before meals)    nystatin (MYCOSTATIN) cream Apply topically daily as needed for dry skin    imiquimod (ALDARA) 5 % cream Apply topically as needed    DOXERCALCIFEROL IV Inject 6 mcg into the vein three times a week    ASPIRIN PO Take 81 mg by mouth every evening     CLONAZEPAM PO Take 0.5 mg by mouth nightly as needed for anxiety (restless legs)    CLOPIDOGREL BISULFATE PO Take 75 mg by mouth every evening     abacavir (ZIAGEN) 300 MG tablet Take 600 mg by mouth every evening     calcium acetate (PHOSLO) 667 MG CAPS 2,001 mg 3 times daily (with meals) Take 3 capsules three times a day with meals    chlorhexidine (CHLORHEXIDINE) 0.12 % solution Rinse and gargle 15 ml by mouth twice a day as directed.    hydrocortisone (ANUCORT-HC) 25 MG suppository Place 25 mg rectally 2 times daily as needed     terbinafine (LAMISIL AT) 1 % cream Apply topically 2 times daily as needed     atorvastatin (LIPITOR) 40 MG tablet Take 40 mg by mouth At Bedtime    epoetin brittni (EPOGEN,PROCRIT) 96069 UNIT/ML injection Inject 11,000 Units Subcutaneous three times a week WITH DIALYSIS    iron sucrose (VENOFER) 20 MG/ML injection Inject 50 mg into the vein once a week WIth dialysis    MAGNESIUM OXIDE PO Take 400 mg by mouth At Bedtime    dolutegravir (TIVICAY) 50 MG tablet Take 50 mg by mouth At Bedtime    nitroglycerin (NITROSTAT) 0.4 MG SL tablet One tablet under the tongue every 5 minutes if needed for chest pain. May repeat every 5 minutes for a maximum of 3 doses in 15 minutes\"    gabapentin (NEURONTIN) 300 MG capsule Take 300 mg by mouth every morning     dapsone 100 MG tablet Take 100 mg by mouth At Bedtime     Darunavir Ethanolate (PREZISTA PO) Take 800 mg by mouth At Bedtime.    ritonavir (NORVIR) 100 MG capsule Take 1 capsule by mouth At Bedtime       Facility Administered Medications as of 1/7/2017             0.9% sodium chloride BOLUS 250 mLs by Hemodialysis Machine route once    0.9% sodium chloride BOLUS Inject " "250-1,000 mLs into the vein Once in dialysis    epoetin brittni (EPOGEN/PROCRIT) injection 10,000 Units Inject 1 mL (10,000 Units) into the vein once    lidocaine 1 % 0.5-5 mL 0.5-5 mLs by Other route once as needed (mild pain For local anesthetic during PICC insertion.)    lidocaine (LMX4) kit Apply topically once as needed for moderate pain (for local anesthetic during PICC insertion)    sodium chloride (PF) 0.9% PF flush 5-50 mL 5-50 mLs by Intracatheter route once as needed for line flush (to flush each lumen with line placement)    heparin lock flush 10 UNIT/ML injection 2-5 mL 2-5 mLs by Intracatheter route once as needed for line flush (for locking each dormant lumen with line placement)    atropine 0.1 MG/ML injection     naloxone (NARCAN) injection 0.1-0.4 mg Inject 0.25-1 mLs (0.1-0.4 mg) into the vein every 2 minutes as needed for opioid reversal    ipratropium - albuterol 0.5 mg/2.5 mg/3 mL (DUONEB) neb solution 3 mL Take 3 mLs by nebulization every 4 hours as needed for shortness of breath / dyspnea    fentaNYL Citrate (PF) (SUBLIMAZE) injection 25-50 mcg Inject 0.5-1 mLs (25-50 mcg) into the vein every hour as needed for severe pain    propofol (DIPRIVAN) infusion Inject 445-6,675 mcg/min into the vein continuous    Linked Group 1:  \"And\" Linked Group Details     propofol (DIPRIVAN) infusion 10-20 mg Inject 1-2 mLs (10-20 mg) into the vein every 30 minutes as needed (Agitation while Intubated)    Linked Group 1:  \"And\" Linked Group Details     insulin aspart (NovoLOG) inj (RAPID ACTING) Inject 1-6 Units Subcutaneous every 4 hours    magnesium sulfate 4 g in 100 mL sterile water (premade) Inject 100 mLs (4 g) into the vein every 4 hours as needed for magnesium supplementation    midazolam (VERSED) injection 1-2 mg Inject 1-2 mLs (1-2 mg) into the vein every hour as needed for sedation    atropine injection 0.5 mg Inject 5 mLs (0.5 mg) into the vein once    EPINEPHrine (ADRENALIN) injection 0.5 mg Inject " "0.5 mLs (0.5 mg) Subcutaneous once    DOPamine 400 mg in dextrose 5% 250 mL (adult std) - premix Inject 178-1,780 mcg/min into the vein continuous    acetaminophen (OFIRMEV) infusion 1,000 mg Inject 100 mLs (1,000 mg) into the vein every 6 hours as needed for fever    sodium chloride (PF) 0.9% PF flush 10-20 mL 10-20 mLs by Intracatheter route every hour as needed for line flush or post meds or blood draw    sodium chloride (PF) 0.9% PF flush 10 mL 10 mLs by Intracatheter route every 7 days    0.9% sodium chloride infusion Inject into the vein continuous    pantoprazole (PROTONIX) 40 mg IV push injection Inject 40 mg into the vein every morning (before breakfast)    diphenhydrAMINE (BENADRYL) injection 25 mg Inject 0.5 mLs (25 mg) into the vein once    hydrocortisone sodium succinate PF (Solu-CORTEF) injection 50 mg Inject 50 mg into the vein once    0.9% sodium chloride BOLUS (Discontinued) Inject 100 mLs into the vein every 5 minutes as needed (see admin instructions)    albumin human 5 % injection 250 mL (Discontinued) Inject 250 mLs into the vein every hour as needed for other (see admini instructions)    No heparin products (Discontinued) continuous prn    glucose 40 % gel 15-30 g (Discontinued) Take 15-30 g by mouth every 15 minutes as needed for low blood sugar    Linked Group 2:  \"Or\" Linked Group Details     dextrose 50 % injection 25-50 mL (Discontinued) Inject 25-50 mLs into the vein every 15 minutes as needed for low blood sugar    Linked Group 2:  \"Or\" Linked Group Details     glucagon injection 1 mg (Discontinued) Inject 1 mg Subcutaneous every 15 minutes as needed for low blood sugar (May repeat x 1 only)    Linked Group 2:  \"Or\" Linked Group Details     glycopyrrolate (ROBINUL) injection (Discontinued) as needed    propofol (DIPRIVAN) injection 10 mg/mL vial (Discontinued) as needed    rocuronium (ZEMURON) injection (Discontinued) as needed    succinylcholine (ANECTINE) injection (Discontinued) as " needed    benzonatate (TESSALON) capsule 100 mg Take 1 capsule (100 mg) by mouth 3 times daily as needed for cough    0.9% sodium chloride BOLUS Inject 1,000 mLs into the vein once    iopamidol (ISOVUE-370) solution 500 mL Inject 500 mLs into the vein once    piperacillin-tazobactam (ZOSYN) infusion 2.25 g Inject 50 mLs (2.25 g) into the vein every 6 hours    levofloxacin (LEVAQUIN) infusion 750 mg Inject 150 mLs (750 mg) into the vein once    vancomycin (VANCOCIN) 1,750 mg in NaCl 0.9 % 500 mL intermittent infusion Inject 1,750 mg into the vein once    vancomycin place koenig - receiving intermittent dosing 1 each See Admin Instructions    levofloxacin (LEVAQUIN) infusion 500 mg Starting on 1/8/2017. Inject 100 mLs (500 mg) into the vein every 48 hours    ipratropium - albuterol 0.5 mg/2.5 mg/3 mL (DUONEB) neb solution 3 mL (Discontinued) Take 3 mLs by nebulization every 4 hours as needed for wheezing    naloxone (NARCAN) injection 0.1-0.4 mg (Discontinued) Inject 0.25-1 mLs (0.1-0.4 mg) into the vein every 2 minutes as needed for opioid reversal    guaiFENesin-codeine (ROBITUSSIN AC) 100-10 MG/5ML solution 5-10 mL Take 5-10 mLs by mouth every 4 hours as needed for cough    HYDROmorphone (PF) (DILAUDID) injection 0.3-0.5 mg Inject 0.3-0.5 mg into the vein every 2 hours as needed for severe pain (breakthrough pain)    insulin aspart (NovoLOG) inj (RAPID ACTING) (Discontinued) Inject 16 Units Subcutaneous 3 times daily (with meals)    - MEDICATION INSTRUCTIONS for Dialysis Patients - See Admin Instructions    iopamidol (ISOVUE-370) solution 100 mL Inject 100 mLs into the vein once    lidocaine 1 % 1 mL 1 mL by Other route every hour as needed (mild pain with VAD insertion or accessing implanted port)    lidocaine (LMX4) kit Apply topically every hour as needed for pain (with VAD insertion or accessing implanted port.)    sodium chloride (PF) 0.9% PF flush 3 mL 3 mLs by Intracatheter route every hour as needed for  line flush    sodium chloride (PF) 0.9% PF flush 3 mL 3 mLs by Intracatheter route every 8 hours    HOLD:  Metformin and metformin containing medications if patient received IV contrast HOLD    0.9% sodium chloride infusion (Discontinued) Inject into the vein continuous    HYDROcodone-acetaminophen (NORCO) 5-325 MG per tablet 1-2 tablet (Discontinued) Take 1-2 tablets by mouth every 4 hours as needed for moderate to severe pain    acetaminophen (TYLENOL) tablet 325-650 mg (Discontinued) Take 1-2 tablets (325-650 mg) by mouth every 4 hours as needed for mild pain or headaches    abacavir (ZIAGEN) tablet 600 mg Take 2 tablets (600 mg) by mouth every evening    aspirin chewable tablet 81 mg Take 1 tablet (81 mg) by mouth every evening    atorvastatin (LIPITOR) tablet 40 mg Take 1 tablet (40 mg) by mouth At Bedtime    calcium acetate (PHOSLO) capsule 2,001 mg Take 3 capsules (2,001 mg) by mouth 3 times daily (with meals)    chlorhexidine (PERIDEX) 0.12 % solution 15 mL Swish and spit 15 mLs in mouth 2 times daily    clonazePAM (klonoPIN) tablet 0.5 mg Take 1 tablet (0.5 mg) by mouth nightly as needed for anxiety (restless legs)    clopidogrel (PLAVIX) tablet 75 mg Take 1 tablet (75 mg) by mouth every evening    dapsone tablet 100 mg Take 4 tablets (100 mg) by mouth At Bedtime    darunavir (PREZISTA) tablet 800 mg Take 1 tablet (800 mg) by mouth At Bedtime    dolutegravir (TIVICAY) tablet 50 mg Take 1 tablet (50 mg) by mouth At Bedtime    gabapentin (NEURONTIN) capsule 300 mg Take 1 capsule (300 mg) by mouth every morning    gabapentin (NEURONTIN) capsule 600 mg Take 2 capsules (600 mg) by mouth every evening    hydrocortisone (ANUSOL-HC) Suppository 25 mg Place 1 suppository (25 mg) rectally 2 times daily as needed for hemorrhoids    magnesium oxide (MAG-OX) tablet 400 mg Take 1 tablet (400 mg) by mouth At Bedtime    mycophenolate (CELLCEPT BRAND) capsule 500 mg Take 2 capsules (500 mg) by mouth 2 times daily     "nitroglycerin (NITROSTAT) sublingual tablet 0.4 mg Place 1 tablet (0.4 mg) under the tongue every 5 minutes as needed for chest pain    ritonavir (NORVIR) tablet 100 mg Take 1 tablet (100 mg) by mouth At Bedtime    terbinafine (lamISIL) 1 % cream Apply topically 2 times daily as needed (fungal infection)    magnesium hydroxide (MILK OF MAGNESIA) suspension 30 mL Take 30 mLs by mouth daily as needed for constipation    bisacodyl (DULCOLAX) Suppository 10 mg Place 1 suppository (10 mg) rectally daily as needed for constipation    ondansetron (ZOFRAN-ODT) ODT tab 4 mg Take 1 tablet (4 mg) by mouth every 6 hours as needed for nausea    Linked Group 3:  \"Or\" Linked Group Details     ondansetron (ZOFRAN) injection 4 mg Inject 2 mLs (4 mg) into the vein every 6 hours as needed for nausea or vomiting    Linked Group 3:  \"Or\" Linked Group Details     glucose 40 % gel 15-30 g Take 15-30 g by mouth every 15 minutes as needed for low blood sugar    Linked Group 4:  \"Or\" Linked Group Details     dextrose 50 % injection 25-50 mL Inject 25-50 mLs into the vein every 15 minutes as needed for low blood sugar    Linked Group 4:  \"Or\" Linked Group Details     glucagon injection 1 mg Inject 1 mg Subcutaneous every 15 minutes as needed for low blood sugar (May repeat x 1 only)    Linked Group 4:  \"Or\" Linked Group Details     amLODIPine (NORVASC) tablet 5 mg (Discontinued) Take 1 tablet (5 mg) by mouth At Bedtime    carvedilol (COREG) tablet 12.5 mg (Discontinued) Take 1 tablet (12.5 mg) by mouth 2 times daily (with meals)    hydrALAZINE (APRESOLINE) tablet 25 mg (Discontinued) Take 1 tablet (25 mg) by mouth 3 times daily    insulin glargine (LANTUS) injection 50 Units (Discontinued) Inject 50 Units Subcutaneous every morning    isosorbide mononitrate (IMDUR) 24 hr tablet 60 mg (Discontinued) Take 2 tablets (60 mg) by mouth every evening    losartan (COZAAR) tablet 100 mg (Discontinued) Take 1 tablet (100 mg) by mouth At Bedtime    " "naloxone (NARCAN) injection 0.1-0.4 mg (Discontinued) Inject 0.25-1 mLs (0.1-0.4 mg) into the vein every 2 minutes as needed for opioid reversal    insulin aspart (NovoLOG) inj (RAPID ACTING) (Discontinued) Inject 1-10 Units Subcutaneous 3 times daily (before meals)    insulin aspart (NovoLOG) inj (RAPID ACTING) (Discontinued) Inject 1-7 Units Subcutaneous At Bedtime    fentaNYL Citrate (PF) (SUBLIMAZE) 100 MCG/2ML injection     midazolam (VERSED) 1 MG/ML injection     heparin (porcine) 1000 UNIT/ML injection           Vital Signs: /47 mmHg  Pulse 94  Temp(Src) 98.5  F (36.9  C) (Axillary)  Resp 14  Ht 1.803 m (5' 11\")  Wt 88.2 kg (194 lb 7.1 oz)  BMI 27.13 kg/m2  SpO2 96%      EXAM    general   On vent  sedate     lungs   Coarse front     CV   RRR     abd   soft     skin   I don't see lesions / rashes     neuro   Sedate for me   Iv access   L arm dialysis access looks OK  PICC R arm some blood, o/w OK     joints   No asymmetric joint swellings       Data    Micro    Influenza A/B PCR (--)  RSV  PCR (--)    LABS    WBC      6.2    1/7/2017  HGB      6.1    1/7/2017  PLT        76    1/7/2017  PLT       104    11/19/2016     CR     7.81   1/7/2017  Recent Labs   Lab Test  01/07/17   0530   ALBUMIN  2.6*   BILITOTAL  0.6   ALKPHOS  57   AST  20   ALT  16      ASSESSMENT & SUGGESTIONS    (J18.9)   Pneumonia of both lungs   (R06.02)   SOB (shortness of breath)  (N18.6,  Z99.2)  ESRD (end stage renal disease) on dialysis (H)  (B20)   (HIV) disease (HCC)  (J96.90)   Respiratory failure (H)    No clear clue to unusual exposure risk  Recent CD4 > 200 and pt on dapsone, so OI seems unlikely  Has had neg Tspots in past (outside lab)  No clear clue to \"extra-pulm\" or systemic infection (pulm-renal / -CNS / -skin / -liver syndromes)  On empiric broad spectrum anti infectives    Cont broad spectrum antimicrobial agents       Anti HIV meds       Antibacterial for severe pneumonia  Day-to-day reassess  Discussed w " brother, wife, daughter and other family members ...    Total time 30 min / > 50 % care coord / elements discussing status - options in future - criteria for better        Jovi Hou MD  Covering for Elaine Olivera [employer]  [facility] Infectious Diseases  [phone]

## 2017-01-07 NOTE — PROGRESS NOTES
Dr. Flores paged. Lab called stating all his blood transfusions have been irradiated but it was not ordered this time. MD paged to change order,  Await new orders.  Medina Guallpa

## 2017-01-07 NOTE — PLAN OF CARE
RT note-  Received patient on adult volume vent  Ventilation Mode: CMV/AC  FiO2 (%): 50 %  Rate Set (breaths/minute): 14 breaths/min  Tidal Volume Set (mL): 550 mL  PEEP (cm H2O): 5 cmH2O  Oxygen Concentration (%): 45 %  Resp: 14   BS-coarse throughout. Sx bloody/rust colored secretions.  Sputum sent to lab.  RT will continue to follow

## 2017-01-07 NOTE — PROVIDER NOTIFICATION
"On call MD paged at 1801: \"Pt is maintaining elevated temps even after ice packs, Tylenol, and lightweight clothing/bedding. His lungs also sound awful. Per ID note, if keeps temping, CT of abd, pelvis, chest. Please address. Thx.\"    Dr. Jhaveri to come see pt after ICU.    Dr. Jhaveri paged at 2010: \"Do you want to forgo PO contrast to get the CT done ASAP? Otherwise 2 hour wait time. Thx.\"    Got ok to not give PO contrast. Just IV in CT.    On call MD paged at 7360: \"Desating on 10 L oxymizer. Placed on 15 L oxymask to maintain sats. Plan?\"    Desatting to low 80s on full oxymask. Spoke with MD. Plan for ICU tx.    Transferred to . Report given. Accepting RN to call family with update.  "

## 2017-01-07 NOTE — PROGRESS NOTES
POTASSIUM      Date    Value    Ref Range    Status      01/07/2017    5.5  3.4 - 5.3 mmol/L    Final        HGB        1/7/2017  Weight: 88.2 kg  1/7/20170      Patient dialyzed for 3.5 hrs. on a 2 K bath with a net fluid removal of 2.0 L.  A BFR of 450 ml/min was obtained via a left upper arm AV fistula using 15 gauge needles.        Total heparin received during the treatment: 0 units. Sites held x 12 min then drsgs applied.  Meds  Given: Epogen, 2000u. Blood products: 5%, 250ml albumin, 1 unit PRBCs.. Complications: none.  Procedure and ESRD teaching done and questions answered. See flowsheet in EPIC for further details and post assessment.  Pt off machine at 1115,, needles pulled sites held X 10 minutes, bandages applied..    Machine water alarm in place and functioning.  Consent for dialysis signed.  1/7/2017  Vascular Access: Aseptic prep done for both on/off.  HEPATITIS B SURFACE ANTIGEN: negative DATE: 12/24/16  HEPATITIS B SURFACE ANTIBODY: unknown  Chlorine/Chloramine water system checked every 4 hours.  Report given to: JERSEY Wilson, RN  [facility]

## 2017-01-07 NOTE — PROGRESS NOTES
[facility]  Procedure Note          Peripherally Inserted Central Line Catheter (PICC):       Donald Raza  MRN# [id number]   January 7, 2017, 4:17 AM Indication: Medication administration           Pause for the cause: Consent for catheter placement procedure signed  Time out completed  Patient ID's verified using two distinct indicators  All necessary equipment is present   Type of line to be used: PICC   Full barrier precautions used: Yes   Skin preparation: Chloraprep   Date of insertion: January 7, 2017, 4:17 AM   Device type: Double lumen, valved, 5.0   Catheter brand: [employer]   Lot number: [id number]   Insertion location: Right basilic vein   Method of placement: Venipuncture  MST  Ultrasound   Number of attempts: With ultrasound: 1   Without ultrasound: 0   Difficulty threading: No   Midline IV device: Dressing dry and intact  Transparent semmipermeable dressing applied  Chlorhexidine patch   Arm circumference: Adults 10 cm   Midline extremity circumference: 35 cm   Internal length: 36 cm   Midline visible catheter length: 5 cm   Total catheter length: 41cm   Tip termination: SVC   Method of verification: Chest x-ray   Midline patency post placement: Positive blood return  Flushes without difficulty  Saline locked   Line flush: Line flush documented on the eMAR   Placement verified by: Physician   Catheter placed by: Laura Del Rosario   Discontinuation form initiated: No   Patient tolerance: Tolerated well  Intradermal injection   PICC Insertion Education Complete: No      Summary:  This procedure was performed without difficulty and he tolerated the procedure well with no immediate complications.       Recorded by Laura Del Rosario RN    Unable to do education due to patient being intubated    Using Sterile technique Catheter pulled back 3 cm per Xray report.

## 2017-01-08 ENCOUNTER — APPOINTMENT (OUTPATIENT)
Dept: CARDIOLOGY | Facility: CLINIC | Age: 67
DRG: 286 | End: 2017-01-08
Attending: ANESTHESIOLOGY
Payer: MEDICARE

## 2017-01-08 LAB
ALBUMIN SERPL-MCNC: 2.3 G/DL (ref 3.4–5)
ALP SERPL-CCNC: 52 U/L (ref 40–150)
ALT SERPL W P-5'-P-CCNC: 376 U/L (ref 0–70)
ANION GAP SERPL CALCULATED.3IONS-SCNC: 12 MMOL/L (ref 3–14)
APTT PPP: 38 SEC (ref 22–37)
AST SERPL W P-5'-P-CCNC: 619 U/L (ref 0–45)
BASE EXCESS BLDV CALC-SCNC: 6.8 MMOL/L
BILIRUB SERPL-MCNC: 0.8 MG/DL (ref 0.2–1.3)
BUN SERPL-MCNC: 37 MG/DL (ref 7–30)
CALCIUM SERPL-MCNC: 7.9 MG/DL (ref 8.5–10.1)
CHLORIDE SERPL-SCNC: 97 MMOL/L (ref 94–109)
CO2 SERPL-SCNC: 27 MMOL/L (ref 20–32)
CREAT SERPL-MCNC: 5.64 MG/DL (ref 0.66–1.25)
ERYTHROCYTE [DISTWIDTH] IN BLOOD BY AUTOMATED COUNT: 16.3 % (ref 10–15)
FIBRINOGEN PPP-MCNC: 745 MG/DL (ref 200–420)
GFR SERPL CREATININE-BSD FRML MDRD: 10 ML/MIN/1.7M2
GLUCOSE BLDC GLUCOMTR-MCNC: 105 MG/DL (ref 70–99)
GLUCOSE BLDC GLUCOMTR-MCNC: 138 MG/DL (ref 70–99)
GLUCOSE BLDC GLUCOMTR-MCNC: 148 MG/DL (ref 70–99)
GLUCOSE BLDC GLUCOMTR-MCNC: 170 MG/DL (ref 70–99)
GLUCOSE BLDC GLUCOMTR-MCNC: 91 MG/DL (ref 70–99)
GLUCOSE BLDC GLUCOMTR-MCNC: 95 MG/DL (ref 70–99)
GLUCOSE SERPL-MCNC: 162 MG/DL (ref 70–99)
HCO3 BLDV-SCNC: 32 MMOL/L (ref 21–28)
HCT VFR BLD AUTO: 21.6 % (ref 40–53)
HGB BLD-MCNC: 6.9 G/DL (ref 13.3–17.7)
HGB BLD-MCNC: 6.9 G/DL (ref 13.3–17.7)
INR PPP: 1.41 (ref 0.86–1.14)
MAGNESIUM SERPL-MCNC: 2.1 MG/DL (ref 1.6–2.3)
MCH RBC QN AUTO: 28.6 PG (ref 26.5–33)
MCHC RBC AUTO-ENTMCNC: 31.9 G/DL (ref 31.5–36.5)
MCV RBC AUTO: 90 FL (ref 78–100)
O2/TOTAL GAS SETTING VFR VENT: ABNORMAL %
OXYHGB MFR BLDV: 67 %
PCO2 BLDV: 48 MM HG (ref 40–50)
PH BLDV: 7.43 PH (ref 7.32–7.43)
PHOSPHATE SERPL-MCNC: 5.1 MG/DL (ref 2.5–4.5)
PLATELET # BLD AUTO: 93 10E9/L (ref 150–450)
PO2 BLDV: 40 MM HG (ref 25–47)
POTASSIUM SERPL-SCNC: 4.5 MMOL/L (ref 3.4–5.3)
PROT SERPL-MCNC: 6 G/DL (ref 6.8–8.8)
RBC # BLD AUTO: 2.41 10E12/L (ref 4.4–5.9)
SODIUM SERPL-SCNC: 136 MMOL/L (ref 133–144)
VANCOMYCIN SERPL-MCNC: 12.9 MG/L
WBC # BLD AUTO: 4.7 10E9/L (ref 4–11)

## 2017-01-08 PROCEDURE — 85610 PROTHROMBIN TIME: CPT | Performed by: INTERNAL MEDICINE

## 2017-01-08 PROCEDURE — 25000125 ZZHC RX 250: Performed by: INTERNAL MEDICINE

## 2017-01-08 PROCEDURE — A9270 NON-COVERED ITEM OR SERVICE: HCPCS | Mod: GY | Performed by: INTERNAL MEDICINE

## 2017-01-08 PROCEDURE — 94003 VENT MGMT INPAT SUBQ DAY: CPT

## 2017-01-08 PROCEDURE — 25000132 ZZH RX MED GY IP 250 OP 250 PS 637: Mod: GY | Performed by: INTERNAL MEDICINE

## 2017-01-08 PROCEDURE — 82805 BLOOD GASES W/O2 SATURATION: CPT | Performed by: ANESTHESIOLOGY

## 2017-01-08 PROCEDURE — 99233 SBSQ HOSP IP/OBS HIGH 50: CPT | Performed by: INTERNAL MEDICINE

## 2017-01-08 PROCEDURE — 93306 TTE W/DOPPLER COMPLETE: CPT | Mod: 26 | Performed by: INTERNAL MEDICINE

## 2017-01-08 PROCEDURE — 83735 ASSAY OF MAGNESIUM: CPT | Performed by: INTERNAL MEDICINE

## 2017-01-08 PROCEDURE — 80053 COMPREHEN METABOLIC PANEL: CPT | Performed by: INTERNAL MEDICINE

## 2017-01-08 PROCEDURE — 40000275 ZZH STATISTIC RCP TIME EA 10 MIN

## 2017-01-08 PROCEDURE — 84100 ASSAY OF PHOSPHORUS: CPT | Performed by: INTERNAL MEDICINE

## 2017-01-08 PROCEDURE — 93306 TTE W/DOPPLER COMPLETE: CPT

## 2017-01-08 PROCEDURE — 20000003 ZZH R&B ICU

## 2017-01-08 PROCEDURE — 85027 COMPLETE CBC AUTOMATED: CPT | Performed by: INTERNAL MEDICINE

## 2017-01-08 PROCEDURE — 25000125 ZZHC RX 250: Performed by: ANESTHESIOLOGY

## 2017-01-08 PROCEDURE — 99291 CRITICAL CARE FIRST HOUR: CPT | Performed by: ANESTHESIOLOGY

## 2017-01-08 PROCEDURE — 85384 FIBRINOGEN ACTIVITY: CPT | Performed by: INTERNAL MEDICINE

## 2017-01-08 PROCEDURE — 85730 THROMBOPLASTIN TIME PARTIAL: CPT | Performed by: INTERNAL MEDICINE

## 2017-01-08 PROCEDURE — 85018 HEMOGLOBIN: CPT | Performed by: ANESTHESIOLOGY

## 2017-01-08 PROCEDURE — 25000128 H RX IP 250 OP 636: Performed by: INTERNAL MEDICINE

## 2017-01-08 PROCEDURE — 00000146 ZZHCL STATISTIC GLUCOSE BY METER IP

## 2017-01-08 PROCEDURE — 80202 ASSAY OF VANCOMYCIN: CPT | Performed by: INTERNAL MEDICINE

## 2017-01-08 RX ORDER — HYDROMORPHONE HYDROCHLORIDE 1 MG/ML
.3-.5 INJECTION, SOLUTION INTRAMUSCULAR; INTRAVENOUS; SUBCUTANEOUS
Status: DISCONTINUED | OUTPATIENT
Start: 2017-01-08 | End: 2017-01-22 | Stop reason: HOSPADM

## 2017-01-08 RX ADMIN — TAZOBACTAM SODIUM AND PIPERACILLIN SODIUM 2.25 G: 250; 2 INJECTION, SOLUTION INTRAVENOUS at 09:06

## 2017-01-08 RX ADMIN — ASPIRIN 81 MG 81 MG: 81 TABLET ORAL at 20:08

## 2017-01-08 RX ADMIN — PROPOFOL 25 MCG/KG/MIN: 10 INJECTION, EMULSION INTRAVENOUS at 18:35

## 2017-01-08 RX ADMIN — CHLORHEXIDINE GLUCONATE 15 ML: 1.2 RINSE ORAL at 09:06

## 2017-01-08 RX ADMIN — DAPSONE 100 MG: 25 TABLET ORAL at 20:07

## 2017-01-08 RX ADMIN — HYDROMORPHONE HYDROCHLORIDE 0.5 MG: 10 INJECTION, SOLUTION INTRAMUSCULAR; INTRAVENOUS; SUBCUTANEOUS at 20:40

## 2017-01-08 RX ADMIN — GABAPENTIN 300 MG: 250 SUSPENSION ORAL at 09:06

## 2017-01-08 RX ADMIN — GABAPENTIN 600 MG: 250 SOLUTION ORAL at 20:19

## 2017-01-08 RX ADMIN — CLOPIDOGREL 75 MG: 75 TABLET, FILM COATED ORAL at 20:07

## 2017-01-08 RX ADMIN — HYDROMORPHONE HYDROCHLORIDE 0.5 MG: 10 INJECTION, SOLUTION INTRAMUSCULAR; INTRAVENOUS; SUBCUTANEOUS at 11:41

## 2017-01-08 RX ADMIN — DARUNAVIR 800 MG: 800 TABLET, FILM COATED ORAL at 21:47

## 2017-01-08 RX ADMIN — CALCIUM ACETATE 2001 MG: 667 CAPSULE ORAL at 09:06

## 2017-01-08 RX ADMIN — MYCOPHENOLATE MOFETIL 500 MG: 250 CAPSULE ORAL at 20:22

## 2017-01-08 RX ADMIN — PROPOFOL 25 MCG/KG/MIN: 10 INJECTION, EMULSION INTRAVENOUS at 03:56

## 2017-01-08 RX ADMIN — CHLORHEXIDINE GLUCONATE 15 ML: 1.2 RINSE ORAL at 20:26

## 2017-01-08 RX ADMIN — ABACAVIR 600 MG: 300 TABLET ORAL at 20:13

## 2017-01-08 RX ADMIN — RITONAVIR 100 MG: 100 TABLET, FILM COATED ORAL at 21:49

## 2017-01-08 RX ADMIN — TAZOBACTAM SODIUM AND PIPERACILLIN SODIUM 2.25 G: 250; 2 INJECTION, SOLUTION INTRAVENOUS at 23:39

## 2017-01-08 RX ADMIN — MYCOPHENOLATE MOFETIL 500 MG: 250 CAPSULE ORAL at 09:07

## 2017-01-08 RX ADMIN — VANCOMYCIN HYDROCHLORIDE 1750 MG: 10 INJECTION, POWDER, LYOPHILIZED, FOR SOLUTION INTRAVENOUS at 16:42

## 2017-01-08 RX ADMIN — ATORVASTATIN CALCIUM 40 MG: 40 TABLET, FILM COATED ORAL at 20:07

## 2017-01-08 RX ADMIN — TAZOBACTAM SODIUM AND PIPERACILLIN SODIUM 2.25 G: 250; 2 INJECTION, SOLUTION INTRAVENOUS at 18:11

## 2017-01-08 RX ADMIN — TAZOBACTAM SODIUM AND PIPERACILLIN SODIUM 2.25 G: 250; 2 INJECTION, SOLUTION INTRAVENOUS at 03:31

## 2017-01-08 RX ADMIN — LEVOFLOXACIN 500 MG: 5 INJECTION, SOLUTION INTRAVENOUS at 20:06

## 2017-01-08 RX ADMIN — PANTOPRAZOLE SODIUM 40 MG: 40 INJECTION, POWDER, FOR SOLUTION INTRAVENOUS at 09:06

## 2017-01-08 RX ADMIN — MAGNESIUM OXIDE TAB 400 MG (241.3 MG ELEMENTAL MG) 400 MG: 400 (241.3 MG) TAB at 21:48

## 2017-01-08 RX ADMIN — PROPOFOL 25 MCG/KG/MIN: 10 INJECTION, EMULSION INTRAVENOUS at 11:07

## 2017-01-08 NOTE — PLAN OF CARE
Problem: Goal Outcome Summary  Goal: Goal Outcome Summary  Outcome: No Change  ICU End of Shift Summary.  For vital signs and complete assessments, please see documentation flowsheets.     Pertinent assessments: hgb 6.0, Cl at times upper 50's. No urine output, daughter states makes urine daily at baseline. Tmax 101.8 apap given.   Major Shift Events: 1 unit PRBC's given, propofol weaned down to 25. Dilaudid given x 1.   Plan (Upcoming Events): Continue Zosyn, Vanco. Dialysis as scheduled.   Discharge/Transfer Needs:     Bedside Shift Report Completed   Bedside Safety Check Completed

## 2017-01-08 NOTE — PROGRESS NOTES
Note Infectious Disease Consult Service Progress Note   Pt Name Donald Raza   Date 01/08/2017   MRN [id number]     Notes / labs / imaging test results and other data were reviewed    CHIEF COMPLAINT: REASON FOR VISIT    VDRF    HPI  65 yo w HIV, ESRD,  Latest labs 12/16/16 (outside lab)  CD4 262    viral load undetectable    On dapsone PJP prophylaxis  On abacavir + darunavir + dolutegravir + ritonavir    Pt on vent / sedate  On empiric broad spectrum antimicrobial agents for bacterial infection    Jan 8 Temp down / Transaminases UP    ROS  Pt not able to offer ROS ...     Remainder of 14-point ROS was negative except as listed above    PFSH:  Personal / Family / Social Histories were reviewed and updated  nothing new      CURRENT MED REVIEWD      Prescription Medications as of 1/8/2017             insulin lispro (HUMALOG KWIKPEN) 100 UNIT/ML injection Inject Subcutaneous 3 times daily (before meals) Sliding scale - 2 units per 50 over 150    Multiple Vitamins-Minerals (DIALYVITE 800/ULTRA D) TABS Take 1 tablet by mouth daily    Omega-3 Fatty Acids (OMEGA-3 FISH OIL PO) Take 2 g by mouth 2 times daily (with meals)    mycophenolate (CELLCEPT - GENERIC EQUIVALENT) 500 MG tablet Take 500 mg by mouth 2 times daily On an empty stomach    isosorbide mononitrate (IMDUR) 30 MG 24 hr tablet Take 2 tablets (60 mg) by mouth every evening    hydrALAZINE (APRESOLINE) 50 MG tablet Take 0.5 tablets (25 mg) by mouth 3 times daily    losartan (COZAAR) 100 MG tablet Take 1 tablet (100 mg) by mouth At Bedtime    carvedilol (COREG) 12.5 MG tablet Take 1 tablet (12.5 mg) by mouth 2 times daily (with meals)    amLODIPine (NORVASC) 5 MG tablet Take 1 tablet (5 mg) by mouth At Bedtime    gabapentin (NEURONTIN) 300 MG capsule Take 600 mg by mouth every evening    insulin glargine (LANTUS) 100 UNIT/ML PEN Inject 50 Units Subcutaneous every morning    insulin lispro (HUMALOG KWIKPEN) 100 UNIT/ML soln Inject 15 Units Subcutaneous 3 times  "daily (before meals)    nystatin (MYCOSTATIN) cream Apply topically daily as needed for dry skin    imiquimod (ALDARA) 5 % cream Apply topically as needed    DOXERCALCIFEROL IV Inject 6 mcg into the vein three times a week    ASPIRIN PO Take 81 mg by mouth every evening     CLONAZEPAM PO Take 0.5 mg by mouth nightly as needed for anxiety (restless legs)    CLOPIDOGREL BISULFATE PO Take 75 mg by mouth every evening     abacavir (ZIAGEN) 300 MG tablet Take 600 mg by mouth every evening     calcium acetate (PHOSLO) 667 MG CAPS 2,001 mg 3 times daily (with meals) Take 3 capsules three times a day with meals    chlorhexidine (CHLORHEXIDINE) 0.12 % solution Rinse and gargle 15 ml by mouth twice a day as directed.    hydrocortisone (ANUCORT-HC) 25 MG suppository Place 25 mg rectally 2 times daily as needed     terbinafine (LAMISIL AT) 1 % cream Apply topically 2 times daily as needed     atorvastatin (LIPITOR) 40 MG tablet Take 40 mg by mouth At Bedtime    epoetin brittni (EPOGEN,PROCRIT) 00289 UNIT/ML injection Inject 11,000 Units Subcutaneous three times a week WITH DIALYSIS    iron sucrose (VENOFER) 20 MG/ML injection Inject 50 mg into the vein once a week WIth dialysis    MAGNESIUM OXIDE PO Take 400 mg by mouth At Bedtime    dolutegravir (TIVICAY) 50 MG tablet Take 50 mg by mouth At Bedtime    nitroglycerin (NITROSTAT) 0.4 MG SL tablet One tablet under the tongue every 5 minutes if needed for chest pain. May repeat every 5 minutes for a maximum of 3 doses in 15 minutes\"    gabapentin (NEURONTIN) 300 MG capsule Take 300 mg by mouth every morning     dapsone 100 MG tablet Take 100 mg by mouth At Bedtime     Darunavir Ethanolate (PREZISTA PO) Take 800 mg by mouth At Bedtime.    ritonavir (NORVIR) 100 MG capsule Take 1 capsule by mouth At Bedtime       Facility Administered Medications as of 1/8/2017             HYDROmorphone (PF) (DILAUDID) injection 0.3-0.5 mg Inject 0.3-0.5 mg into the vein every 2 hours as needed for " "moderate to severe pain    vancomycin (VANCOCIN) 1,750 mg in NaCl 0.9 % 250 mL intermittent infusion Inject 1,750 mg into the vein once    lidocaine (LMX4) kit Apply topically once as needed for moderate pain (for local anesthetic during PICC insertion)    heparin lock flush 10 UNIT/ML injection 2-5 mL 2-5 mLs by Intracatheter route once as needed for line flush (for locking each dormant lumen with line placement)    naloxone (NARCAN) injection 0.1-0.4 mg Inject 0.25-1 mLs (0.1-0.4 mg) into the vein every 2 minutes as needed for opioid reversal    ipratropium - albuterol 0.5 mg/2.5 mg/3 mL (DUONEB) neb solution 3 mL Take 3 mLs by nebulization every 4 hours as needed for shortness of breath / dyspnea    fentaNYL Citrate (PF) (SUBLIMAZE) injection 25-50 mcg Inject 0.5-1 mLs (25-50 mcg) into the vein every hour as needed for severe pain    propofol (DIPRIVAN) infusion Inject 445-6,675 mcg/min into the vein continuous    Linked Group 1:  \"And\" Linked Group Details     propofol (DIPRIVAN) infusion 10-20 mg Inject 1-2 mLs (10-20 mg) into the vein every 30 minutes as needed (Agitation while Intubated)    Linked Group 1:  \"And\" Linked Group Details     insulin aspart (NovoLOG) inj (RAPID ACTING) Inject 1-6 Units Subcutaneous every 4 hours    magnesium sulfate 4 g in 100 mL sterile water (premade) Inject 100 mLs (4 g) into the vein every 4 hours as needed for magnesium supplementation    midazolam (VERSED) injection 1-2 mg Inject 1-2 mLs (1-2 mg) into the vein every hour as needed for sedation    DOPamine 400 mg in dextrose 5% 250 mL (adult std) - premix Inject 178-1,780 mcg/min into the vein continuous    sodium chloride (PF) 0.9% PF flush 10-20 mL 10-20 mLs by Intracatheter route every hour as needed for line flush or post meds or blood draw    sodium chloride (PF) 0.9% PF flush 10 mL 10 mLs by Intracatheter route every 7 days    0.9% sodium chloride infusion Inject into the vein continuous    pantoprazole (PROTONIX) 40 mg " IV push injection Inject 40 mg into the vein every morning (before breakfast)    diphenhydrAMINE (BENADRYL) injection 25 mg Inject 0.5 mLs (25 mg) into the vein once    hydrocortisone sodium succinate PF (Solu-CORTEF) injection 50 mg Inject 50 mg into the vein once    gabapentin (NEURONTIN) solution 300 mg Take 6 mLs (300 mg) by mouth every morning    gabapentin (NEURONTIN) solution 600 mg Take 12 mLs (600 mg) by mouth every evening    acetaminophen (OFIRMEV) infusion 1,000 mg (Discontinued) Inject 100 mLs (1,000 mg) into the vein every 6 hours as needed for fever    piperacillin-tazobactam (ZOSYN) infusion 2.25 g Inject 50 mLs (2.25 g) into the vein every 6 hours    vancomycin place koenig - receiving intermittent dosing 1 each See Admin Instructions    levofloxacin (LEVAQUIN) infusion 500 mg Inject 100 mLs (500 mg) into the vein every 48 hours    benzonatate (TESSALON) capsule 100 mg (Discontinued) Take 1 capsule (100 mg) by mouth 3 times daily as needed for cough    guaiFENesin-codeine (ROBITUSSIN AC) 100-10 MG/5ML solution 5-10 mL Take 5-10 mLs by mouth every 4 hours as needed for cough    HYDROmorphone (PF) (DILAUDID) injection 0.3-0.5 mg (Discontinued) Inject 0.3-0.5 mg into the vein every 2 hours as needed for severe pain (breakthrough pain)    - MEDICATION INSTRUCTIONS for Dialysis Patients - See Admin Instructions    iopamidol (ISOVUE-370) solution 100 mL Inject 100 mLs into the vein once    lidocaine 1 % 1 mL 1 mL by Other route every hour as needed (mild pain with VAD insertion or accessing implanted port)    lidocaine (LMX4) kit Apply topically every hour as needed for pain (with VAD insertion or accessing implanted port.)    sodium chloride (PF) 0.9% PF flush 3 mL 3 mLs by Intracatheter route every hour as needed for line flush    sodium chloride (PF) 0.9% PF flush 3 mL 3 mLs by Intracatheter route every 8 hours    HOLD:  Metformin and metformin containing medications if patient received IV contrast  "HOLD    abacavir (ZIAGEN) tablet 600 mg Take 2 tablets (600 mg) by mouth every evening    aspirin chewable tablet 81 mg Take 1 tablet (81 mg) by mouth every evening    atorvastatin (LIPITOR) tablet 40 mg Take 1 tablet (40 mg) by mouth At Bedtime    calcium acetate (PHOSLO) capsule 2,001 mg Take 3 capsules (2,001 mg) by mouth 3 times daily (with meals)    chlorhexidine (PERIDEX) 0.12 % solution 15 mL Swish and spit 15 mLs in mouth 2 times daily    clonazePAM (klonoPIN) tablet 0.5 mg Take 1 tablet (0.5 mg) by mouth nightly as needed for anxiety (restless legs)    clopidogrel (PLAVIX) tablet 75 mg Take 1 tablet (75 mg) by mouth every evening    dapsone tablet 100 mg Take 4 tablets (100 mg) by mouth At Bedtime    darunavir (PREZISTA) tablet 800 mg Take 1 tablet (800 mg) by mouth At Bedtime    dolutegravir (TIVICAY) tablet 50 mg Take 1 tablet (50 mg) by mouth At Bedtime    hydrocortisone (ANUSOL-HC) Suppository 25 mg Place 1 suppository (25 mg) rectally 2 times daily as needed for hemorrhoids    magnesium oxide (MAG-OX) tablet 400 mg Take 1 tablet (400 mg) by mouth At Bedtime    mycophenolate (CELLCEPT BRAND) capsule 500 mg Take 2 capsules (500 mg) by mouth 2 times daily    ritonavir (NORVIR) tablet 100 mg Take 1 tablet (100 mg) by mouth At Bedtime    terbinafine (lamISIL) 1 % cream Apply topically 2 times daily as needed (fungal infection)    magnesium hydroxide (MILK OF MAGNESIA) suspension 30 mL Take 30 mLs by mouth daily as needed for constipation    bisacodyl (DULCOLAX) Suppository 10 mg Place 1 suppository (10 mg) rectally daily as needed for constipation    ondansetron (ZOFRAN-ODT) ODT tab 4 mg Take 1 tablet (4 mg) by mouth every 6 hours as needed for nausea    Linked Group 2:  \"Or\" Linked Group Details     ondansetron (ZOFRAN) injection 4 mg Inject 2 mLs (4 mg) into the vein every 6 hours as needed for nausea or vomiting    Linked Group 2:  \"Or\" Linked Group Details     glucose 40 % gel 15-30 g Take 15-30 g by " "mouth every 15 minutes as needed for low blood sugar    Linked Group 3:  \"Or\" Linked Group Details     dextrose 50 % injection 25-50 mL Inject 25-50 mLs into the vein every 15 minutes as needed for low blood sugar    Linked Group 3:  \"Or\" Linked Group Details     glucagon injection 1 mg Inject 1 mg Subcutaneous every 15 minutes as needed for low blood sugar (May repeat x 1 only)    Linked Group 3:  \"Or\" Linked Group Details     gabapentin (NEURONTIN) capsule 300 mg (Discontinued) Take 1 capsule (300 mg) by mouth every morning    gabapentin (NEURONTIN) capsule 600 mg (Discontinued) Take 2 capsules (600 mg) by mouth every evening    nitroglycerin (NITROSTAT) sublingual tablet 0.4 mg (Discontinued) Place 1 tablet (0.4 mg) under the tongue every 5 minutes as needed for chest pain    fentaNYL Citrate (PF) (SUBLIMAZE) 100 MCG/2ML injection     midazolam (VERSED) 1 MG/ML injection     heparin (porcine) 1000 UNIT/ML injection           Vital Signs: /66 mmHg  Pulse 94  Temp(Src) 97.5  F (36.4  C) (Axillary)  Resp 14  Ht 1.803 m (5' 11\")  Wt 86.8 kg (191 lb 5.8 oz)  BMI 26.70 kg/m2  SpO2 97%    Temp (12hrs), Av.4  F (36.3  C), Min:97.1  F (36.2  C), Max:97.5  F (36.4  C)  Temp (24hrs), Av.1  F (37.3  C), Min:97.1  F (36.2  C), Max:101.8  F (38.8  C)  Temp (48hrs), Av.6  F (37.6  C), Min:97.1  F (36.2  C), Max:102.6  F (39.2  C)       EXAM    General   On vent     Neuro   Gives me 2 thumbs up  Moves all limbs  Interacts appropriately     abd   Soft  Not tender     Skin   No new findings     CV   RRR     Lungs Coarse  No wheezing or rhonchi for me       Data    Micro    Influenza A/B PCR (--)  RSV  PCR (--)  Nares MRSA PCR (--)    LABS    Lab Results   Component Value Date/Time    WBC 4.7 2017 06:36 AM    WBC 3.9* 2017 09:05 PM    CREATININE 7.9* 2016 09:40 AM    * 2017 06:36 AM    AST 20 2017 05:30 AM    * 2017 06:36 AM    ALT 16 2017 05:30 AM    " ALKALINE PHOSPHATASE 52 01/08/2017 06:36 AM    ALKALINE PHOSPHATASE 57 01/07/2017 05:30 AM          ASSESSMENT & SUGGESTIONS    (J18.9)   Pneumonia of both lungs   (R06.02)   SOB (shortness of breath)  (N18.6,  Z99.2)  ESRD (end stage renal disease) on dialysis (H)  (B20)   (HIV) disease (HCC)  (J96.90)   Respiratory failure (H)    No clear clue to unusual exposure risk  Clinically better .... Awake, interactive, calm, possible extubation today  Nares PCR neg for MRSA  Sputum culture not revealing any putative pathogen    Cont HIV meds, empiric broad spectrum antimicrobial agents, other supportive care         Jovi Hou MD  Covering for Elaine Olivera [employer]  [facility] Infectious Diseases  [phone]

## 2017-01-08 NOTE — PHARMACY-VANCOMYCIN DOSING SERVICE
Pharmacy Vancomycin Note  Date of Service 2017  Patient's  1950   66 year old, male    Indication: [facility] Pneumonia  Goal Trough Level: 15-20 mg/L  Day of Therapy: 3  Current Vancomycin regimen:  intermittent    Current estimated CrCl = Estimated Creatinine Clearance: 15.4 mL/min (based on Cr of 5.64).    Creatinine for last 3 days  2017:  8:00 PM Creatinine 5.01 mg/dL*  2017:  5:30 AM Creatinine 7.81 mg/dL*  2017:  6:36 AM Creatinine 5.64 mg/dL*    Recent Vancomycin Levels (past 3 days)  2017:  6:36 AM Vancomycin Level 12.9 mg/L    Vancomycin IV Administrations (past 72 hours)                   vancomycin (VANCOCIN) 1,750 mg in NaCl 0.9 % 500 mL intermittent infusion (mg) 1,750 mg New Bag 17 0437                Nephrotoxins and other renal medications (Future)    Start     Dose/Rate Route Frequency Ordered Stop    17 2215  piperacillin-tazobactam (ZOSYN) infusion 2.25 g     Comments:  Pharmacy can adjust dose based on renal function.    2.25 g  100 mL/hr over 30 Minutes Intravenous EVERY 6 HOURS 17 2206      17 2212  vancomycin place koenig - receiving intermittent dosing      1 each Does not apply SEE ADMIN INSTRUCTIONS 17 2212               Contrast Orders - past 72 hours (72h ago through future)    Start     Dose/Rate Route Frequency Ordered Stop    17 2045  iopamidol (ISOVUE-370) solution 500 mL      500 mL Intravenous ONCE 17          Interpretation of levels and current regimen:  Trough level is  Subtherapeutic  Renal Function: ESRD on Dialysis    Plan:  1.  One time dose of Vancomycin IV 1750mg (20mg/kg) today.  2.  Pharmacy will check trough levels as appropriate in 1-3 Days.    3.  Pharmacy will continue to follow closely.    Adriana Tolbert

## 2017-01-08 NOTE — PROGRESS NOTES
Message sent to tele hub regarding pressors.  Asked for clarification if dopamine should restart for low blood pressure or if the tele hub MD would like a different pressor.  Await response.  Medina Skinner

## 2017-01-08 NOTE — PROGRESS NOTES
" Renal Medicine Progress Note            Assessment/Plan:     1) Respiratory Failure:  Due to pneumonia.  Cx unrevealing so far.  On broad spectrum abx.      2) Septic Shock:  Also due to pneumonia.  Stabilizing.    3) HIV:  On HAART.    4) ESRD: HD yesterday.  2 L removed.  K normal.  Volume status looks OK today.    5) Anemia:  HGB 6.9.  On EPO 10K.        Plan:    Next HD typically Tuesday.  Eval day by day for dialysis needs,given that he is critically ill.            Interval History:   Unable.  Pt is intubated/sedated.          Medications and Allergies:       insulin aspart  1-6 Units Subcutaneous Q4H     sodium chloride (PF)  10 mL Intracatheter Q7 Days     pantoprazole  40 mg Intravenous QAM AC     gabapentin  300 mg Oral QAM     gabapentin  600 mg Oral QPM     piperacillin-tazobactam  2.25 g Intravenous Q6H     vancomycin place koenig - receiving intermittent dosing  1 each Does not apply See Admin Instructions     levofloxacin  500 mg Intravenous Q48H     - MEDICATION INSTRUCTIONS for Dialysis Patients -   Does not apply See Admin Instructions     iopamidol  100 mL Intravenous Once     sodium chloride (PF)  3 mL Intracatheter Q8H     abacavir  600 mg Oral QPM     aspirin chewable tablet 81 mg  81 mg Oral QPM     atorvastatin  40 mg Oral At Bedtime     calcium acetate  2,001 mg Oral TID w/meals     chlorhexidine  15 mL Swish & Spit BID     clopidogrel (PLAVIX) tablet 75 mg  75 mg Oral QPM     dapsone  100 mg Oral At Bedtime     darunavir  800 mg Oral At Bedtime     dolutegravir  50 mg Oral At Bedtime     magnesium oxide (MAG-OX) tablet 400 mg  400 mg Oral At Bedtime     mycophenolate  500 mg Oral BID     ritonavir  100 mg Oral At Bedtime        Allergies   Allergen Reactions     Lisinopril      Sulfa Drugs             Physical Exam:   Vitals were reviewed  /66 mmHg  Pulse 94  Temp(Src) 98.1  F (36.7  C) (Axillary)  Resp 13  Ht 1.803 m (5' 11\")  Wt 86.8 kg (191 lb 5.8 oz)  BMI 26.70 kg/m2  SpO2 " 94%    Wt Readings from Last 3 Encounters:   01/08/17 86.8 kg (191 lb 5.8 oz)   11/28/16 88.905 kg (196 lb)   11/28/16 86.183 kg (190 lb)       Intake/Output Summary (Last 24 hours) at 01/08/17 1722  Last data filed at 01/08/17 1600   Gross per 24 hour   Intake 1307.27 ml   Output      0 ml   Net 1307.27 ml       GENERAL APPEARANCE: intubated, sedated  HEENT:  Eyes/ears/nose/neck grossly normal  RESP: + vent sounds, coarse BS bilat  CV: RRR, nl S1/S2, no m/r/g   ABDOMEN: o/s/nt/nd, bs present  EXTREMITIES/SKIN: no c/c/rashes/lesions; no edema  NEURO:  sedated           Data:     BMP  Recent Labs  Lab 01/08/17  0636 01/07/17  1411 01/07/17  0530 01/05/17  2000 01/05/17  0644     --  133 137 134   POTASSIUM 4.5 3.8 5.5* 3.9 4.5   CHLORIDE 97  --  97 98 96   PRABHA 7.9*  --  8.3* 8.1* 8.6   CO2 27  --  26 29 27   BUN 37*  --  53* 29 67*   CR 5.64*  --  7.81* 5.01* 9.14*   *  --  143* 105* 166*     CBC  Recent Labs  Lab 01/08/17  1415 01/08/17 0636 01/07/17  2105 01/07/17  1411 01/07/17  0530 01/06/17  0619   WBC  --  4.7 3.9*  --  6.2 6.4   HGB 6.9* 6.9* 6.0* 6.1* 5.4* 6.7*   HCT  --  21.6* 18.9*  --  17.6* 21.7*   MCV  --  90 90  --  92 92   PLT  --  93* 86*  --  76* 82*     Lab Results   Component Value Date    * 01/08/2017    * 01/08/2017    ALKPHOS 52 01/08/2017    BILITOTAL 0.8 01/08/2017    BILICONJ 0.0 07/06/2014     Lab Results   Component Value Date    INR 1.41* 01/08/2017       Attestation:  I have reviewed today's vital signs, notes, medications, labs and imaging.    Rudolph Wilkes MD  [facility]  [phone]

## 2017-01-08 NOTE — PROGRESS NOTES
[facility] Intensive Care Progress Note    Date of Service (when I saw the patient): 01/08/2017     Assessment and Plan  Donald Raza is a 66 year old male with complex medical hx of HAART for hx of HIV, ESRD on HD, CAD, Hypertension, DM type 2 on insulin, chronic anemia  admitted on 1/3/2017 with episodes of chest pain and was determined non cardiac in etiology given recent heart cath findings. He was admitted to ICU for respiratory failure requiring intubation secondary to pneumonia.       Neurology:  Sedated; agitated and non-focal with light sedation.   Plan: Minimize sedation, monitor.      Cardiovascular:  Septic shock requiring vasopressor. H/o CAD and multiple PCI. ECG unremarkable. He was started on Dopamine in setting of episode of bradycardia. Off pressors currently.   Plan:  TTE.      Pulmonary:        Hypoxemic respiratory failure requiring intubation on 1/7 most likely sec to PNA: febrile, bilateral LL infiltrates.    Plan: Continue  broad spectrum ABX. Until all cultures available. PS trial.     Ventilation Mode: CMV/AC  FiO2 (%): 45 %  Rate Set (breaths/minute): 14 breaths/min  Tidal Volume Set (mL): 550 mL  PEEP (cm H2O): 5 cmH2O  Oxygen Concentration (%): 45 %  Resp: 14    Renal  Hyperkalemia on admission resolved. H/o ESRD on HD. HD on 1/7.   Plan: Monitor.     ID:         New septic shock: etiology most likely PNA. H/O HIV on antiretroviral therapy. Pan-cultured.   Plan: Continue broad spectrum antibiotics until all cultures available.     GI  No concerns.    Nutrition  Malnutrition.  Plan: If not extubated by tomorrow will place TF.      Endocrine:  H/o DM type II.   Plan: Monitor and treat hyperglycemia.      Heme/Onc:  Acute on chronic anemia. Thrombocytopenia. No clear source of bleeding.2 unit PRBC for hgb 5,4 and then 61. on 1/7/17 . Hgb 6.9 today.  Plan: No transfusion for Hgb 6.9, since off pressors and stable. Repeat Hgb.     DVT Prophylaxis:  Pneumatic Compression Devices  GI Prophylaxis: PPI    Restraints: Restraints for medical healing needed: YES    Family update by me today: yes.    Elodia Flores    Time Spent on This Encounter  Billing:  I spent 35 minutes bedside and on the inpatient unit today managing the critical care of Donald Raza in relation to the issues listed in this note.    Main Plans for Today  TTE, PS as tolerated, monitor Hgb.       Physical Exam  Temp: 97.5  F (36.4  C) Temp src: Axillary Temp  Min: 97.1  F (36.2  C)  Max: 101.8  F (38.8  C) BP: 140/66 mmHg   Heart Rate: 60 Resp: 14 SpO2: 97 % O2 Device: Mechanical Ventilator    Filed Vitals:    01/07/17 0500 01/07/17 0745 01/08/17 0346   Weight: 88.2 kg (194 lb 7.1 oz) 88.2 kg (194 lb 7.1 oz) 86.8 kg (191 lb 5.8 oz)     I/O last 3 completed shifts:  In: 994.53 [I.V.:384.53; NG/GT:310]  Out: -     Neurologic: sedated, grossly non-focal.   Cardiovascular: RRR, no murmurs and gallops, pulses present all 4 extremities.    Respiratory: breath sounds course bl, decrease bl LL.    GI: soft, non-tender, non-distended.    Skin/Extremities: pale, no deformities or edema.    Lines: No erythema or discharge at entry site for PICC Line  Current lines are required for patient management    Medications    propofol (DIPRIVAN) infusion 30 mcg/kg/min (01/08/17 1404)     DOPamine Stopped (01/07/17 1408)     NaCl 10 mL/hr at 01/08/17 0800       vancomycin (VANCOCIN) IV  1,750 mg (central catheter) Intravenous Once     insulin aspart  1-6 Units Subcutaneous Q4H     sodium chloride (PF)  10 mL Intracatheter Q7 Days     pantoprazole  40 mg Intravenous QAM AC     gabapentin  300 mg Oral QAM     gabapentin  600 mg Oral QPM     piperacillin-tazobactam  2.25 g Intravenous Q6H     vancomycin place koenig - receiving intermittent dosing  1 each Does not apply See Admin Instructions     levofloxacin  500 mg Intravenous Q48H     - MEDICATION INSTRUCTIONS for Dialysis Patients -   Does not apply See Admin  Instructions     iopamidol  100 mL Intravenous Once     sodium chloride (PF)  3 mL Intracatheter Q8H     abacavir  600 mg Oral QPM     aspirin chewable tablet 81 mg  81 mg Oral QPM     atorvastatin  40 mg Oral At Bedtime     calcium acetate  2,001 mg Oral TID w/meals     chlorhexidine  15 mL Swish & Spit BID     clopidogrel (PLAVIX) tablet 75 mg  75 mg Oral QPM     dapsone  100 mg Oral At Bedtime     darunavir  800 mg Oral At Bedtime     dolutegravir  50 mg Oral At Bedtime     magnesium oxide (MAG-OX) tablet 400 mg  400 mg Oral At Bedtime     mycophenolate  500 mg Oral BID     ritonavir  100 mg Oral At Bedtime       Data    Recent Labs  Lab 01/08/17  0636 01/07/17  2105 01/07/17  1411 01/07/17  0530  01/05/17 2000 01/03/17  2112 01/03/17  1710 01/03/17  1245   WBC 4.7 3.9*  --  6.2  < > 5.0  < >  --   --  9.0   HGB 6.9* 6.0* 6.1* 5.4*  < > 6.2*  < >  --   --  7.5*   MCV 90 90  --  92  < > 91  < >  --   --  88   PLT 93* 86*  --  76*  < > 76*  < >  --   --  139*   INR 1.41*  --   --   --   --   --   --   --   --   --      --   --  133  --  137  < >  --   --  133   POTASSIUM 4.5  --  3.8 5.5*  --  3.9  < >  --   --  3.3*   CHLORIDE 97  --   --  97  --  98  < >  --   --  93*   CO2 27  --   --  26  --  29  < >  --   --  30   BUN 37*  --   --  53*  --  29  < >  --   --  27   CR 5.64*  --   --  7.81*  --  5.01*  < >  --   --  4.37*   ANIONGAP 12  --   --  10  --  10  < >  --   --  10   PRABHA 7.9*  --   --  8.3*  --  8.1*  < >  --   --  9.0   *  --   --  143*  --  105*  < >  --   --  164*   ALBUMIN 2.3*  --   --  2.6*  --   --   --   --   --   --    PROTTOTAL 6.0*  --   --  6.2*  --   --   --   --   --   --    BILITOTAL 0.8  --   --  0.6  --   --   --   --   --   --    ALKPHOS 52  --   --  57  --   --   --   --   --   --    *  --   --  16  --   --   --   --   --   --    *  --   --  20  --   --   --   --   --   --    TROPI  --   --   --   --   --   --   --  <0.015The 99th percentile for upper  reference range is 0.045 ug/L.  Troponin values in the range of 0.045 - 0.120 ug/L may be associated with risks of adverse clinical events. <0.015The 99th percentile for upper reference range is 0.045 ug/L.  Troponin values in the range of 0.045 - 0.120 ug/L may be associated with risks of adverse clinical events. <0.015The 99th percentile for upper reference range is 0.045 ug/L.  Troponin values in the range of 0.045 - 0.120 ug/L may be associated with risks of adverse clinical events.   < > = values in this interval not displayed.  No results found for this or any previous visit (from the past 24 hour(s)).     The history and the 10 points review of systems are included in the note above.    I spent 35 minutes of critical care time evaluating and managing this patient, discussing with the consultants and the patient's family.    Elodia Flores MD  Anesthesiology Critical Care Medicine  Pg. [phone]

## 2017-01-08 NOTE — PLAN OF CARE
Problem: Cardiac: Acute Coronary Syndrome (ACS) (Adult)  Goal: Signs and Symptoms of Listed Potential Problems Will be Absent or Manageable (Cardiac: Acute Coronary Syndrome)  Signs and symptoms of listed potential problems will be absent or manageable by discharge/transition of care (reference Cardiac: Acute Coronary Syndrome (ACS) (Adult) CPG).   Neuro: responsive today.   Follows commands. Prop sedation stopped this afternoon for 45min. Pt able to respond to questions.   Cards: SB. BP stable  Resp: CPAP/PS today for 45min. Pts sats remained stable for most of wean. Pt states he feels SOB. sats at 89% at this time and a black nodule suctioned from ET tube. ?? This was causing pt to have a hard time breathing and was causing vent to alarm. Ok now. Will attempt wean again later or tomorrow. Lungs dim.   GI: NG clamped. No bm today. Pt NPO  : not making urine. Nephrology aware  Pain: Dilaudid for back pain  Skin: intact  Lines: PICC, PIV  Drips: Prop at 25, abx  Labs: hgb rechecked 6.9 this afternoon. Will cont to monitor, CR elevated along with liver enzymes  Plan: extubate tomorrow. ?dialysis- will cont to monitor. Recheck liver function panel in am.

## 2017-01-08 NOTE — PROGRESS NOTES
[facility]  Hospitalist Progress Note  Tam Fraser MD, MD 01/08/2017  (Text Page)  Reason for Stay (Diagnosis): acute respiratory failure         Assessment and Plan:      Summary of Stay: Donald Raza is a 66 year old male with complex medical hx of HAART for hx of HIV, ESRD on HD, CAD, Hypertension, DM type 2 on insulin, chronic anemia  admitted on 1/3/2017 with episodes of chest pain and was determined non cardiac in etiology given recent heart cath findings. He started having intermittent fever spikes ( similar to previous hospitalizations) and complicated with increasing O2 requirements and eventually led to severe respiratory distress, unresponsiveness and eventually had a CODE blue event and intubation hence this ICU hospitalization.    Problem List:   1. Acute respiratory failure secondary likely to bilateral pneumonia suspect HCAP   2. Fever spikes suspect from #1  3. Worsening anemia on top of chronic anemia  4. ESRD on HD  5. HIV on HAART  6. DM type 2 insulin requiring  7. CAD with recent LHC  8. Hypertension  9. Septic shock requiring vasopressors with earlier fever, findings of HCAP, tachypnea, alteration in mental state, now off pressors  10. Thrombocytopenia likely sepsis related  11. Abnormal LFT's- suspect from shock liver given earlier hypotension and bradycardia.    Continue ICU care.  On broad spectrum Abx started earlier on Zosyn, Levaquin and Vancomycin. Appreciate ID input   Cultures sent earlier and still has no reported growth.  Repeat HGB this afternoon and if trending down then will provide with PRBC transfusion  Monitor for any bleeding events. Unsure regarding etiology of acute changes on Hgb status. Patient is also on DAPT given CAD with previous stents. Cardiology signed off earlier after LHC showed no new lesions from earlier complaints of chest pain.  Currently has no bleeding symptoms or events.   Deferring chemical prophylaxis for now given above anemia  "and current thrombocytopenia.  Resume beta blockers, Nitro, once off vasopressors and BP permitting.  Appreciate intensivist input as he remained on the ventilator  Continue to trend LFT's and if not improving then pursue imaging such as abdominal sonogram. Bilirubin is normal  Stop IVF.  Physical restraints needed while on the ventilator.    DVT Prophylaxis: Pneumatic Compression Devices  Code Status: Full Code  Discharge Dispo: to be determined  Estimated Disch Date / # of Days until Disch: unclear yet as he remained critically ill        Interval History (Subjective):      Continuing hospitalist care today.  Seen and examined. Poor historian given he is on the ventilator.  Reviewed chart.  He had rapid deterioration earlier and had a code blue due to respiratory distress and unresponsiveness  and subsequently intubated. No reported need for chest compressions   Overnight he tolerated PRBC transfusion and hemodialysis. Currently afebrile.  Off vasoactive medications.                   Physical Exam:      Last Vital Signs:  /66 mmHg  Pulse 94  Temp(Src) 97.5  F (36.4  C) (Axillary)  Resp 14  Ht 1.803 m (5' 11\")  Wt 86.8 kg (191 lb 5.8 oz)  BMI 26.70 kg/m2  SpO2 97%    I/O last 3 completed shifts:  In: 994.53 [I.V.:384.53; NG/GT:310]  Out: -   Wt Readings from Last 1 Encounters:   01/08/17 86.8 kg (191 lb 5.8 oz)     Filed Vitals:    01/05/17 1110 01/06/17 2200 01/07/17 0500 01/07/17 0745   Weight: 85 kg (187 lb 6.3 oz) 89 kg (196 lb 3.4 oz) 88.2 kg (194 lb 7.1 oz) 88.2 kg (194 lb 7.1 oz)    01/08/17 0346   Weight: 86.8 kg (191 lb 5.8 oz)       Constitutional: Intubated, sedated not in distress   Respiratory: Fair air entry, bilateral basal crackles   Cardiovascular: Regular rate and rhythm, normal S1 and S2, and no murmur noted   Abdomen: Normal bowel sounds, soft, non-distended, non-tender   Skin: No rashes, no cyanosis, dry to touch   Neuro: Sedated, intubated,   Extremities: picc line on the R UE, " no edema on both LE   Other(s): Sedated, not agitated       All other systems: Negative          Medications:      All current medications were reviewed with changes reflected in problem list.         Data:      All new lab and imaging data was reviewed.   Labs:    Recent Labs  Lab 01/07/17  1150 01/05/17 2012 01/05/17 2000   CULT Culture negative monitoring continues  Incorrectly ordered by PCU/Clinic Canceled, Test credited ordered sputum culture instead. No growth after 3 days No growth after 3 days       Recent Labs  Lab 01/07/17  0430 01/06/17  2215   PH 7.38 7.38   PO2 262* 37*   PCO2 42 47*   HCO3 25 28   SARANYA  --  2.5       Recent Labs  Lab 01/08/17  0636 01/07/17  1411 01/07/17  0530 01/05/17 2000     --  133 137   POTASSIUM 4.5 3.8 5.5* 3.9   CHLORIDE 97  --  97 98   CO2 27  --  26 29   ANIONGAP 12  --  10 10   *  --  143* 105*   BUN 37*  --  53* 29   CR 5.64*  --  7.81* 5.01*   GFRESTIMATED 10*  --  7* 12*   GFRESTBLACK 12*  --  8* 14*   PRABHA 7.9*  --  8.3* 8.1*       Recent Labs  Lab 01/08/17  0636 01/07/17  2105 01/07/17  1411 01/07/17  0530   WBC 4.7 3.9*  --  6.2   HGB 6.9* 6.0* 6.1* 5.4*   HCT 21.6* 18.9*  --  17.6*   MCV 90 90  --  92   PLT 93* 86*  --  76*       Recent Labs  Lab 01/08/17  0814 01/08/17  0636 01/08/17  0415 01/08/17  0001 01/07/17 2014 01/07/17  1627  01/07/17  0530  01/05/17 2000 01/05/17  0644  01/03/17  1245   GLC  --  162*  --   --   --   --   --  143*  --  105*  --  166*  --  164*   *  --  138* 95 90 100*  < >  --   < >  --   < >  --   < >  --    < > = values in this interval not displayed.    Recent Labs  Lab 01/08/17  0636   INR 1.41*     No results for input(s): TSH in the last 168 hours.   Imaging:   Results for orders placed or performed during the hospital encounter of 01/03/17   XR Chest 2 Views    Narrative    XR CHEST 2 VW 1/3/2017 1:32 PM    HISTORY: Chest pain and shortness of breath.    COMPARISON: 8/16/2016    FINDINGS: Low lung volume.  Vascular congestion and cardiomegaly. No  consolidation. No celsa pleural effusion or pneumothorax.      Impression    IMPRESSION: Cardiomegaly and vascular congestion.    ASHOK SHAIKH MD   XR Chest Port 1 View    Narrative    CHEST ONE VIEW UPRIGHT  1/5/2017  8:13 PM     HISTORY: Abdominal pain and high fever.    COMPARISON: None.      Impression    IMPRESSION: No free air seen underneath the diaphragm. Lungs grossly  clear.    RADHA JORDAN MD   X-ray Abdomen flat port    Narrative    ABDOMEN TWO-THREE VIEW  1/5/2017 8:11 PM     HISTORY: Free air, abdominal pain and fever.    COMPARISON: None.    FINDINGS: The bowel gas pattern is nonspecific. There is no gross free  air, but evaluation of free air is limited by motion artifact and  positioning. There is no pneumatosis. The lung bases are unremarkable.      Impression    IMPRESSION: Nonspecific bowel gas pattern.     RADHA JORDAN MD   CT Chest/Abdomen/Pelvis w Contrast    Narrative    CT CHEST/ABDOMEN/PELVIS WITH CONTRAST   1/6/2017 8:48 PM     HISTORY: Shortness of breath.    TECHNIQUE: 94mL Isovue -370. Radiation dose for this scan was reduced  using automated exposure control, adjustment of the mA and/or kV  according to patient size, or iterative reconstruction technique.    COMPARISON: 8/16/2016    FINDINGS:   Chest: There is a 1 cm right thyroid nodule, also present previously.  In a patient without known thyroid disease, an incidental thyroid  nodule without microcalcifications measuring <1.0 cm in size is most  likely benign and does not typically require follow-up. There is  minimal mediastinal adenopathy. No pleural effusion. There is  consolidative infiltrate within both lower lobes and to a lesser  degree within both upper lobes. The overall amount is moderate. Due to  the presence of some nodularity, I would recommend short-term  follow-up after therapy to confirm resolution and the absence of  underlying nodules.    Abdomen, pelvis: Surgical clips  in the right upper quadrant. Nothing  acute with respect to the upper abdominal organs. Mild prostate  enlargement.      Impression    IMPRESSION:  1. Bilateral consolidative infiltrate/pneumonia. Short-term follow-up  recommended.  2. Minimal mediastinal adenopathy.  3. Additional findings discussed above.    WOLFGANG ROGER MD   XR Chest Port 1 View    Narrative    CHEST SINGLE VIEW PORTABLE  1/7/2017 3:06 AM     HISTORY: Endotracheal tube positioning.    COMPARISON: 1/5/2017 at 1948 hours.    FINDINGS: Interval placement of an endotracheal tube with distal tube  tip likely just entering the right main bronchus. Hypoinflated lungs.  Indistinctness of the central pulmonary vasculature. Probable  cardiomegaly.      Impression    IMPRESSION:   1. Interval placement of an endotracheal tube with distal tube tip  likely just entering the right main bronchus.  2. Indistinctness of the central pulmonary vasculature, possibly  relating to interstitial pulmonary edema.    The position of the endotracheal tube was conveyed to Gisela, the  nursing taking care of the patient in the intensive care unit, by Dr. Kinsey on 1/7/2017 at 0310 hours.    EN KINSEY MD   XR Chest Port 1 View    Narrative    CHEST SINGLE VIEW PORTABLE  1/7/2017 4:05 AM     HISTORY: Nurse placed peripherally inserted central catheter.    COMPARISON: 1/7/2017 at 0258 hours.    FINDINGS: Interval placement of a right upper extremity peripherally  inserted central catheter with distal catheter tip likely in the right  atrium, approximately 3 cm distal to the junction of the superior vena  cava and right atrium. Interval pullback of an endotracheal tube with  distal tube tip now in the trachea, approximately 2.5 cm proximal to  the jerry. Interval placement of an enteric drainage tube with distal  tube tip in the gastric fundus. Hypoinflated lungs. Partial  visualization of a vascular stent in the left axilla.       Impression    IMPRESSION:   1. Interval  placement of a right upper extremity peripherally inserted  central catheter with distal catheter tip in the right atrium,  approximately 3 cm distal to the junction of the superior vena cava  and right atrium.  2. Interval pullback of an endotracheal tube with distal tube tip now  approximately 2.5 cm proximal to the jerry.  3. Interval placement of an enteric drainage tube with distal tube tip  in the gastric fundus.  4. Hazy opacities in the central aspects of bilateral lungs and left  lung base again noted.    EN KINSEY MD

## 2017-01-08 NOTE — PROGRESS NOTES
Pt remains intubated on full ventilatory support.  Was placed on Cpap wean by MD at 2pm, ABG drawn on Cpap was 7.34, 30, 149, 16.  At end of wean patient became agitated and nurse had trouble passing catheter to suction.  Lavage used and patient coughed out very thick blackish brown hard plug into HME.  Patient placed back on CMV.  Resp will continue to follow.

## 2017-01-08 NOTE — PLAN OF CARE
Problem: Restraint for Non-Violent/Non-Self-Destructive Behavior  Goal: Prevent/Manage Potential Problems  Maintain safety of patient and others during period of restraint.  Promote psychological and physical wellbeing.  Prevent injury to skin and involved body parts.  Promote nutrition, hydration, and elimination.   Outcome: No Change  Right wrist and Left wrist restraints continued 1/8/2017    Clinical Justification: Pulling lines, pulling tubes, and pulling equipment  Less Restrictive Alternative: 1:1 patient care, Repositioning   ,     New orders placed Yes  Length of Order: 1 Day      Karen Bhatt

## 2017-01-09 ENCOUNTER — ANESTHESIA EVENT (OUTPATIENT)
Dept: INTENSIVE CARE | Facility: CLINIC | Age: 67
DRG: 286 | End: 2017-01-09
Payer: MEDICARE

## 2017-01-09 ENCOUNTER — APPOINTMENT (OUTPATIENT)
Dept: GENERAL RADIOLOGY | Facility: CLINIC | Age: 67
DRG: 286 | End: 2017-01-09
Attending: SURGERY
Payer: MEDICARE

## 2017-01-09 ENCOUNTER — APPOINTMENT (OUTPATIENT)
Dept: GENERAL RADIOLOGY | Facility: CLINIC | Age: 67
DRG: 286 | End: 2017-01-09
Attending: INTERNAL MEDICINE
Payer: MEDICARE

## 2017-01-09 ENCOUNTER — ANESTHESIA (OUTPATIENT)
Dept: INTENSIVE CARE | Facility: CLINIC | Age: 67
DRG: 286 | End: 2017-01-09
Payer: MEDICARE

## 2017-01-09 LAB
ALBUMIN SERPL-MCNC: 2.2 G/DL (ref 3.4–5)
ALP SERPL-CCNC: 50 U/L (ref 40–150)
ALT SERPL W P-5'-P-CCNC: 264 U/L (ref 0–70)
ANION GAP SERPL CALCULATED.3IONS-SCNC: 12 MMOL/L (ref 3–14)
AST SERPL W P-5'-P-CCNC: 224 U/L (ref 0–45)
BASOPHILS # BLD AUTO: 0 10E9/L (ref 0–0.2)
BASOPHILS NFR BLD AUTO: 0.2 %
BILIRUB SERPL-MCNC: 0.7 MG/DL (ref 0.2–1.3)
BUN SERPL-MCNC: 48 MG/DL (ref 7–30)
CALCIUM SERPL-MCNC: 8.1 MG/DL (ref 8.5–10.1)
CHLORIDE SERPL-SCNC: 98 MMOL/L (ref 94–109)
CO2 SERPL-SCNC: 27 MMOL/L (ref 20–32)
CREAT SERPL-MCNC: 7.42 MG/DL (ref 0.66–1.25)
DIFFERENTIAL METHOD BLD: ABNORMAL
EOSINOPHIL # BLD AUTO: 0.1 10E9/L (ref 0–0.7)
EOSINOPHIL NFR BLD AUTO: 2.7 %
ERYTHROCYTE [DISTWIDTH] IN BLOOD BY AUTOMATED COUNT: 15.9 % (ref 10–15)
GFR SERPL CREATININE-BSD FRML MDRD: 7 ML/MIN/1.7M2
GLUCOSE BLDC GLUCOMTR-MCNC: 102 MG/DL (ref 70–99)
GLUCOSE BLDC GLUCOMTR-MCNC: 103 MG/DL (ref 70–99)
GLUCOSE BLDC GLUCOMTR-MCNC: 113 MG/DL (ref 70–99)
GLUCOSE BLDC GLUCOMTR-MCNC: 115 MG/DL (ref 70–99)
GLUCOSE BLDC GLUCOMTR-MCNC: 117 MG/DL (ref 70–99)
GLUCOSE BLDC GLUCOMTR-MCNC: 90 MG/DL (ref 70–99)
GLUCOSE SERPL-MCNC: 107 MG/DL (ref 70–99)
HCT VFR BLD AUTO: 21 % (ref 40–53)
HGB BLD-MCNC: 6.5 G/DL (ref 13.3–17.7)
IMM GRANULOCYTES # BLD: 0.1 10E9/L (ref 0–0.4)
IMM GRANULOCYTES NFR BLD: 1.7 %
LYMPHOCYTES # BLD AUTO: 0.9 10E9/L (ref 0.8–5.3)
LYMPHOCYTES NFR BLD AUTO: 17 %
MCH RBC QN AUTO: 27.9 PG (ref 26.5–33)
MCHC RBC AUTO-ENTMCNC: 31 G/DL (ref 31.5–36.5)
MCV RBC AUTO: 90 FL (ref 78–100)
MONOCYTES # BLD AUTO: 0.4 10E9/L (ref 0–1.3)
MONOCYTES NFR BLD AUTO: 8.1 %
NEUTROPHILS # BLD AUTO: 3.7 10E9/L (ref 1.6–8.3)
NEUTROPHILS NFR BLD AUTO: 69.3 %
NRBC # BLD AUTO: 0 10*3/UL
NRBC BLD AUTO-RTO: 1 /100
PLATELET # BLD AUTO: 112 10E9/L (ref 150–450)
POTASSIUM SERPL-SCNC: 4.1 MMOL/L (ref 3.4–5.3)
PROT SERPL-MCNC: 6 G/DL (ref 6.8–8.8)
RBC # BLD AUTO: 2.33 10E12/L (ref 4.4–5.9)
SODIUM SERPL-SCNC: 137 MMOL/L (ref 133–144)
WBC # BLD AUTO: 5.3 10E9/L (ref 4–11)

## 2017-01-09 PROCEDURE — A9270 NON-COVERED ITEM OR SERVICE: HCPCS | Mod: GY | Performed by: INTERNAL MEDICINE

## 2017-01-09 PROCEDURE — 40000275 ZZH STATISTIC RCP TIME EA 10 MIN

## 2017-01-09 PROCEDURE — 99233 SBSQ HOSP IP/OBS HIGH 50: CPT | Performed by: INTERNAL MEDICINE

## 2017-01-09 PROCEDURE — 25000125 ZZHC RX 250: Performed by: INTERNAL MEDICINE

## 2017-01-09 PROCEDURE — 25000132 ZZH RX MED GY IP 250 OP 250 PS 637: Mod: GY | Performed by: INTERNAL MEDICINE

## 2017-01-09 PROCEDURE — 25000125 ZZHC RX 250: Performed by: ANESTHESIOLOGY

## 2017-01-09 PROCEDURE — 71010 XR CHEST PORT 1 VW: CPT | Mod: 77

## 2017-01-09 PROCEDURE — 94640 AIRWAY INHALATION TREATMENT: CPT | Mod: 76

## 2017-01-09 PROCEDURE — 31500 INSERT EMERGENCY AIRWAY: CPT

## 2017-01-09 PROCEDURE — 80053 COMPREHEN METABOLIC PANEL: CPT | Performed by: INTERNAL MEDICINE

## 2017-01-09 PROCEDURE — 25000308 HC RX OP HPI UCR WEL MED 250 IP 250: Performed by: SURGERY

## 2017-01-09 PROCEDURE — 71010 XR CHEST PORT 1 VW: CPT

## 2017-01-09 PROCEDURE — 94640 AIRWAY INHALATION TREATMENT: CPT

## 2017-01-09 PROCEDURE — 20000003 ZZH R&B ICU

## 2017-01-09 PROCEDURE — 00000146 ZZHCL STATISTIC GLUCOSE BY METER IP

## 2017-01-09 PROCEDURE — 94003 VENT MGMT INPAT SUBQ DAY: CPT

## 2017-01-09 PROCEDURE — 99291 CRITICAL CARE FIRST HOUR: CPT | Performed by: INTERNAL MEDICINE

## 2017-01-09 PROCEDURE — 25000132 ZZH RX MED GY IP 250 OP 250 PS 637: Mod: GY | Performed by: SURGERY

## 2017-01-09 PROCEDURE — 25000125 ZZHC RX 250: Performed by: NURSE ANESTHETIST, CERTIFIED REGISTERED

## 2017-01-09 PROCEDURE — 5A1955Z RESPIRATORY VENTILATION, GREATER THAN 96 CONSECUTIVE HOURS: ICD-10-PCS | Performed by: NURSE ANESTHETIST, CERTIFIED REGISTERED

## 2017-01-09 PROCEDURE — 85025 COMPLETE CBC W/AUTO DIFF WBC: CPT | Performed by: INTERNAL MEDICINE

## 2017-01-09 PROCEDURE — 25000308 HC RX OP HPI UCR WEL MED 250 IP 250: Performed by: INTERNAL MEDICINE

## 2017-01-09 PROCEDURE — 40000671 ZZH STATISTIC ANESTHESIA CASE

## 2017-01-09 RX ORDER — SODIUM CHLORIDE FOR INHALATION 3 %
5 VIAL, NEBULIZER (ML) INHALATION
Status: DISCONTINUED | OUTPATIENT
Start: 2017-01-09 | End: 2017-01-09

## 2017-01-09 RX ORDER — CLONAZEPAM 0.5 MG/1
0.5 TABLET ORAL
Status: DISCONTINUED | OUTPATIENT
Start: 2017-01-09 | End: 2017-01-16

## 2017-01-09 RX ORDER — ASPIRIN 81 MG/1
81 TABLET, CHEWABLE ORAL EVERY EVENING
Status: DISCONTINUED | OUTPATIENT
Start: 2017-01-09 | End: 2017-01-14

## 2017-01-09 RX ORDER — CODEINE PHOSPHATE AND GUAIFENESIN 10; 100 MG/5ML; MG/5ML
5-10 SOLUTION ORAL EVERY 4 HOURS PRN
Status: DISCONTINUED | OUTPATIENT
Start: 2017-01-09 | End: 2017-01-16

## 2017-01-09 RX ORDER — HEPARIN SODIUM 5000 [USP'U]/.5ML
5000 INJECTION, SOLUTION INTRAVENOUS; SUBCUTANEOUS 2 TIMES DAILY
Status: DISCONTINUED | OUTPATIENT
Start: 2017-01-09 | End: 2017-01-22 | Stop reason: HOSPADM

## 2017-01-09 RX ORDER — GABAPENTIN 250 MG/5ML
300 SOLUTION ORAL EVERY MORNING
Status: DISCONTINUED | OUTPATIENT
Start: 2017-01-10 | End: 2017-01-14

## 2017-01-09 RX ORDER — SODIUM CHLORIDE FOR INHALATION 3 %
5 VIAL, NEBULIZER (ML) INHALATION EVERY 4 HOURS
Status: DISCONTINUED | OUTPATIENT
Start: 2017-01-09 | End: 2017-01-12

## 2017-01-09 RX ORDER — NALOXONE HYDROCHLORIDE 0.4 MG/ML
.1-.4 INJECTION, SOLUTION INTRAMUSCULAR; INTRAVENOUS; SUBCUTANEOUS
Status: DISCONTINUED | OUTPATIENT
Start: 2017-01-09 | End: 2017-01-22 | Stop reason: HOSPADM

## 2017-01-09 RX ORDER — ALBUTEROL SULFATE 0.83 MG/ML
2.5 SOLUTION RESPIRATORY (INHALATION)
Status: DISCONTINUED | OUTPATIENT
Start: 2017-01-09 | End: 2017-01-09

## 2017-01-09 RX ORDER — GABAPENTIN 250 MG/5ML
600 SOLUTION ORAL EVERY EVENING
Status: DISCONTINUED | OUTPATIENT
Start: 2017-01-10 | End: 2017-01-14

## 2017-01-09 RX ORDER — AMINO ACIDS/PROTEIN HYDROLYS 15G-100/30
1 LIQUID (ML) ORAL 3 TIMES DAILY
Status: DISCONTINUED | OUTPATIENT
Start: 2017-01-09 | End: 2017-01-14

## 2017-01-09 RX ORDER — DAPSONE 25 MG/1
100 TABLET ORAL AT BEDTIME
Status: DISCONTINUED | OUTPATIENT
Start: 2017-01-09 | End: 2017-01-14

## 2017-01-09 RX ORDER — MAGNESIUM OXIDE 400 MG/1
400 TABLET ORAL AT BEDTIME
Status: DISCONTINUED | OUTPATIENT
Start: 2017-01-09 | End: 2017-01-14

## 2017-01-09 RX ORDER — ALBUTEROL SULFATE 0.83 MG/ML
2.5 SOLUTION RESPIRATORY (INHALATION)
Status: DISCONTINUED | OUTPATIENT
Start: 2017-01-09 | End: 2017-01-14

## 2017-01-09 RX ADMIN — MYCOPHENOLATE MOFETIL 500 MG: 250 CAPSULE ORAL at 20:32

## 2017-01-09 RX ADMIN — HYDROMORPHONE HYDROCHLORIDE 0.5 MG: 10 INJECTION, SOLUTION INTRAMUSCULAR; INTRAVENOUS; SUBCUTANEOUS at 06:02

## 2017-01-09 RX ADMIN — ALBUTEROL SULFATE 2.5 MG: 2.5 SOLUTION RESPIRATORY (INHALATION) at 19:47

## 2017-01-09 RX ADMIN — MYCOPHENOLATE MOFETIL 500 MG: 250 CAPSULE ORAL at 08:54

## 2017-01-09 RX ADMIN — ASPIRIN 81 MG 81 MG: 81 TABLET ORAL at 20:32

## 2017-01-09 RX ADMIN — CALCIUM ACETATE 2001 MG: 667 CAPSULE ORAL at 08:54

## 2017-01-09 RX ADMIN — TAZOBACTAM SODIUM AND PIPERACILLIN SODIUM 2.25 G: 250; 2 INJECTION, SOLUTION INTRAVENOUS at 05:47

## 2017-01-09 RX ADMIN — PROPOFOL 100 MG: 10 INJECTION, EMULSION INTRAVENOUS at 12:11

## 2017-01-09 RX ADMIN — PROPOFOL 25 MCG/KG/MIN: 10 INJECTION, EMULSION INTRAVENOUS at 01:47

## 2017-01-09 RX ADMIN — CALCIUM ACETATE 2001 MG: 667 CAPSULE ORAL at 17:09

## 2017-01-09 RX ADMIN — PANTOPRAZOLE SODIUM 40 MG: 40 INJECTION, POWDER, FOR SOLUTION INTRAVENOUS at 07:44

## 2017-01-09 RX ADMIN — DARUNAVIR 800 MG: 800 TABLET, FILM COATED ORAL at 20:39

## 2017-01-09 RX ADMIN — RITONAVIR 100 MG: 100 TABLET, FILM COATED ORAL at 20:36

## 2017-01-09 RX ADMIN — SODIUM CHLORIDE SOLN NEBU 3% 5 ML: 3 NEBU SOLN at 23:07

## 2017-01-09 RX ADMIN — HYDROMORPHONE HYDROCHLORIDE 0.5 MG: 10 INJECTION, SOLUTION INTRAMUSCULAR; INTRAVENOUS; SUBCUTANEOUS at 22:00

## 2017-01-09 RX ADMIN — CHLORHEXIDINE GLUCONATE 15 ML: 1.2 RINSE ORAL at 08:54

## 2017-01-09 RX ADMIN — TAZOBACTAM SODIUM AND PIPERACILLIN SODIUM 2.25 G: 250; 2 INJECTION, SOLUTION INTRAVENOUS at 17:08

## 2017-01-09 RX ADMIN — PROPOFOL 35 MCG/KG/MIN: 10 INJECTION, EMULSION INTRAVENOUS at 19:01

## 2017-01-09 RX ADMIN — SUCCINYLCHOLINE CHLORIDE 100 MG: 20 INJECTION, SOLUTION INTRAMUSCULAR; INTRAVENOUS at 12:11

## 2017-01-09 RX ADMIN — PROPOFOL 35 MCG/KG/MIN: 10 INJECTION, EMULSION INTRAVENOUS at 23:28

## 2017-01-09 RX ADMIN — SODIUM CHLORIDE SOLN NEBU 3% 5 ML: 3 NEBU SOLN at 11:23

## 2017-01-09 RX ADMIN — ABACAVIR 600 MG: 300 TABLET ORAL at 20:37

## 2017-01-09 RX ADMIN — CHLORHEXIDINE GLUCONATE 15 ML: 1.2 RINSE ORAL at 20:32

## 2017-01-09 RX ADMIN — DAPSONE 100 MG: 25 TABLET ORAL at 20:32

## 2017-01-09 RX ADMIN — ALBUTEROL SULFATE 2.5 MG: 2.5 SOLUTION RESPIRATORY (INHALATION) at 11:23

## 2017-01-09 RX ADMIN — ALBUTEROL SULFATE 2.5 MG: 2.5 SOLUTION RESPIRATORY (INHALATION) at 15:38

## 2017-01-09 RX ADMIN — ALBUTEROL SULFATE 2.5 MG: 2.5 SOLUTION RESPIRATORY (INHALATION) at 23:07

## 2017-01-09 RX ADMIN — HYDROMORPHONE HYDROCHLORIDE 0.5 MG: 10 INJECTION, SOLUTION INTRAMUSCULAR; INTRAVENOUS; SUBCUTANEOUS at 09:00

## 2017-01-09 RX ADMIN — SODIUM CHLORIDE SOLN NEBU 3% 5 ML: 3 NEBU SOLN at 19:48

## 2017-01-09 RX ADMIN — HEPARIN SODIUM 5000 UNITS: 5000 INJECTION, SOLUTION INTRAVENOUS; SUBCUTANEOUS at 20:32

## 2017-01-09 RX ADMIN — GABAPENTIN 300 MG: 250 SUSPENSION ORAL at 08:54

## 2017-01-09 RX ADMIN — TAZOBACTAM SODIUM AND PIPERACILLIN SODIUM 2.25 G: 250; 2 INJECTION, SOLUTION INTRAVENOUS at 23:27

## 2017-01-09 RX ADMIN — CLOPIDOGREL 75 MG: 75 TABLET, FILM COATED ORAL at 20:32

## 2017-01-09 RX ADMIN — PROPOFOL 35 MCG/KG/MIN: 10 INJECTION, EMULSION INTRAVENOUS at 14:22

## 2017-01-09 RX ADMIN — GABAPENTIN 600 MG: 250 SUSPENSION ORAL at 20:38

## 2017-01-09 RX ADMIN — MAGNESIUM OXIDE TAB 400 MG (241.3 MG ELEMENTAL MG) 400 MG: 400 (241.3 MG) TAB at 22:02

## 2017-01-09 RX ADMIN — TAZOBACTAM SODIUM AND PIPERACILLIN SODIUM 2.25 G: 250; 2 INJECTION, SOLUTION INTRAVENOUS at 12:34

## 2017-01-09 RX ADMIN — SODIUM CHLORIDE SOLN NEBU 3% 5 ML: 3 NEBU SOLN at 15:38

## 2017-01-09 NOTE — PROGRESS NOTES
He was on the ventilator all shift. CMV 14 550 45% p5. He is tolerating the ventilator and sedation well.

## 2017-01-09 NOTE — PLAN OF CARE
Problem: Restraint for Non-Violent/Non-Self-Destructive Behavior  Goal: Prevent/Manage Potential Problems  Maintain safety of patient and others during period of restraint.  Promote psychological and physical wellbeing.  Prevent injury to skin and involved body parts.  Promote nutrition, hydration, and elimination.   soft restraints restraints initiated on patient on 1/9/2017 at 12:39 PM    Clinical Justification: Pulling lines, pulling tubes, and pulling equipment  Less Restrictive Alternative: 1:1 patient care, Repositioning, Re-evaluate equipment, Disguise equipment, Pain management, Alarm, De-escalation, Reorientation  Attending Physician Notified: MD ordered restraint,     Order received: Yes     Family Notification: Spouse/significant other   Criteria explained to daughter  Patient's Response: No evidence of learning  Restraint care Plan initiated: Yes    Medina Skinner

## 2017-01-09 NOTE — PROGRESS NOTES
[facility] Intensive Care Progress Note    Date of Service (when I saw the patient): 01/09/2017     Assessment and Plan  Donald Raza is a 66 year old male with complex medical hx of HAART for hx of HIV, ESRD on HD, CAD, Hypertension, DM type 2 on insulin, chronic anemia  admitted on 1/3/2017 with episodes of chest pain and was determined non cardiac in etiology given recent heart cath findings. He was admitted to ICU for respiratory failure requiring intubation secondary to pneumonia.       Neurology:  Awake and communicative off sedation   Plan: .      Cardiovascular:  Septic shock requiring vasopressor. H/o CAD and multiple PCI. ECG unremarkable. He was started on Dopamine in setting of episode of bradycardia. Off pressors currently.   Plan:  TTE.      Pulmonary:        Hypoxemic respiratory failure requiring intubation on 1/7 most likely sec to PNA: febrile, bilateral LL infiltrates.    Plan: Continue broad spectrum ABX. Until all cultures available. PS trial.     Ventilation Mode: CPAP/PS  FiO2 (%): 45 %  Rate Set (breaths/minute): 14 breaths/min  Tidal Volume Set (mL): 550 mL  PEEP (cm H2O): 5 cmH2O  Pressure Support (cm H2O): 5 cmH2O  Oxygen Concentration (%): 40 %  Resp: 15    Renal  Hyperkalemia on admission resolved. H/o ESRD on HD. HD on 1/7.   Plan: Monitor.     ID:         Sepsis, probable pneumonia: HIV on antiretroviral therapy. Temp improved, cultures NGTD  Plan: Continue broad spectrum antibiotics until all cultures available.     GI  No concerns.    Nutrition  Malnutrition.  Plan: If not extubated will place TF.      Endocrine:  H/o DM type II.   Plan: Monitor and treat hyperglycemia.      Heme/Onc:  Acute on chronic anemia. Thrombocytopenia. No clear source of bleeding.2 unit PRBC for hgb 5,4 and then 61. on 1/7/17 . Hgb 6.9 today.  Plan: Hgb 6.5 today, transfuse 1 unit     DVT Prophylaxis: Pneumatic Compression Devices  GI Prophylaxis: PPI    Restraints:  Restraints for medical healing needed: YES    Family update by me today: yes.    Georgie Crump    Time Spent on This Encounter  Billing:  I spent 35 minutes bedside and on the inpatient unit today managing the critical care of Donald Raza in relation to the issues listed in this note.    Main Plans for Today  Spontaneous breathing trial successful -extubation      Physical Exam  Temp: 99.2  F (37.3  C) Temp src: Axillary Temp  Min: 97.5  F (36.4  C)  Max: 99.5  F (37.5  C) BP: 143/64 mmHg   Heart Rate: 68 Resp: 15 SpO2: 92 % O2 Device: Mechanical Ventilator    Filed Vitals:    01/07/17 0745 01/08/17 0346 01/08/17 2359   Weight: 88.2 kg (194 lb 7.1 oz) 86.8 kg (191 lb 5.8 oz) 87.1 kg (192 lb 0.3 oz)     I/O last 3 completed shifts:  In: 1519.63 [I.V.:1199.63; NG/GT:320]  Out: 0     Neurologic: awake, grossly non-focal.   Cardiovascular: RRR, no murmurs and gallops, pulses present all 4 extremities.    Respiratory: breath sounds course bl, decrease bl LL.  on spontaneous breathing trial, comfortable  GI: soft, non-tender, non-distended.    Skin/Extremities: pale, no deformities or edema.    Lines: No erythema or discharge at entry site for PICC Line  Current lines are required for patient management    Medications    propofol (DIPRIVAN) infusion Stopped (01/09/17 0530)     DOPamine Stopped (01/07/17 1408)     NaCl 10 mL/hr at 01/08/17 0800       insulin aspart  1-6 Units Subcutaneous Q4H     sodium chloride (PF)  10 mL Intracatheter Q7 Days     pantoprazole  40 mg Intravenous QAM AC     gabapentin  300 mg Oral QAM     gabapentin  600 mg Oral QPM     piperacillin-tazobactam  2.25 g Intravenous Q6H     vancomycin place koenig - receiving intermittent dosing  1 each Does not apply See Admin Instructions     levofloxacin  500 mg Intravenous Q48H     - MEDICATION INSTRUCTIONS for Dialysis Patients -   Does not apply See Admin Instructions     iopamidol  100 mL Intravenous Once     sodium chloride (PF)  3 mL  Intracatheter Q8H     abacavir  600 mg Oral QPM     aspirin chewable tablet 81 mg  81 mg Oral QPM     atorvastatin  40 mg Oral At Bedtime     calcium acetate  2,001 mg Oral TID w/meals     chlorhexidine  15 mL Swish & Spit BID     clopidogrel (PLAVIX) tablet 75 mg  75 mg Oral QPM     dapsone  100 mg Oral At Bedtime     darunavir  800 mg Oral At Bedtime     dolutegravir  50 mg Oral At Bedtime     magnesium oxide (MAG-OX) tablet 400 mg  400 mg Oral At Bedtime     mycophenolate  500 mg Oral BID     ritonavir  100 mg Oral At Bedtime       Data    Recent Labs  Lab 01/09/17  0544 01/08/17  1415 01/08/17  0636 01/07/17  2105 01/07/17  1411 01/07/17  0530  01/03/17  2112 01/03/17  1710 01/03/17  1245   WBC 5.3  --  4.7 3.9*  --  6.2  < >  --   --  9.0   HGB 6.5* 6.9* 6.9* 6.0* 6.1* 5.4*  < >  --   --  7.5*   MCV 90  --  90 90  --  92  < >  --   --  88   *  --  93* 86*  --  76*  < >  --   --  139*   INR  --   --  1.41*  --   --   --   --   --   --   --      --  136  --   --  133  < >  --   --  133   POTASSIUM 4.1  --  4.5  --  3.8 5.5*  < >  --   --  3.3*   CHLORIDE 98  --  97  --   --  97  < >  --   --  93*   CO2 27  --  27  --   --  26  < >  --   --  30   BUN 48*  --  37*  --   --  53*  < >  --   --  27   CR 7.42*  --  5.64*  --   --  7.81*  < >  --   --  4.37*   ANIONGAP 12  --  12  --   --  10  < >  --   --  10   PARBHA 8.1*  --  7.9*  --   --  8.3*  < >  --   --  9.0   *  --  162*  --   --  143*  < >  --   --  164*   ALBUMIN 2.2*  --  2.3*  --   --  2.6*  < >  --   --   --    PROTTOTAL 6.0*  --  6.0*  --   --  6.2*  < >  --   --   --    BILITOTAL 0.7  --  0.8  --   --  0.6  < >  --   --   --    ALKPHOS 50  --  52  --   --  57  < >  --   --   --    *  --  376*  --   --  16  < >  --   --   --    *  --  619*  --   --  20  < >  --   --   --    TROPI  --   --   --   --   --   --   --  <0.015The 99th percentile for upper reference range is 0.045 ug/L.  Troponin values in the range of 0.045 -  0.120 ug/L may be associated with risks of adverse clinical events. <0.015The 99th percentile for upper reference range is 0.045 ug/L.  Troponin values in the range of 0.045 - 0.120 ug/L may be associated with risks of adverse clinical events. <0.015The 99th percentile for upper reference range is 0.045 ug/L.  Troponin values in the range of 0.045 - 0.120 ug/L may be associated with risks of adverse clinical events.   < > = values in this interval not displayed.  Recent Results (from the past 24 hour(s))   XR Chest Port 1 View    Narrative    XR CHEST PORT 1 VW  1/9/2017 6:11 AM    HISTORY:  hypoxia    COMPARISON:  1/7/2017      Impression    IMPRESSION:  Endotracheal tube tip approximately 2.5 cm above the  jerry, unchanged. Right subclavian PICC line tip very poorly  visualized, possibly in the caval atrial junction region. Nasogastric  tube tip in the fundus of the stomach. Markedly shallow inspiration.  Hazy opacities in both lower lungs as before.     MD Georgie ALICEA

## 2017-01-09 NOTE — PROGRESS NOTES
[facility]  Infectious Disease Progress Note          Assessment and Plan:   IMPRESSION:    1.  A 66-year-old male, very complex medical history, known to me from prior admission, currently is admitted with chest pain, rule out coronary artery disease, now has developed some increased cough and respiratory symptoms, although chest x-ray is negative.  New acute fever in the hospital as well, question respiratory infection, although not initially obvious, no leukocytosis, not really productive sputum and again chest x-ray negative.    2.  Chronic human immunodeficiency virus infection for over 30 years, treated at [facility], most recent T cells were 3 weeks ago at which time T cells were 263 and undetectable virus, i.e., unlikely to have an opportunistic infection, is on a fairly complicated 5-drug regimen, but well controlled.    3.  Known coronary disease, does not appear he has that as an explanation for current symptoms.    4.  Diabetes mellitus.    5.  End-stage renal disease on chronic dialysis on the transplant list.    6.  Sulfa allergy.    7.  Nephrolithiasis.   8 Resp failure, infiltrate presumed pneumonia, intubated in ICU       RECOMMENDATIONS:    1.  Continue his current antiretroviral therapy, as best able intubated.    2.  Continue vanco/zosyn, await cultures ,likely Dc  vanco if no growth that requires.    3 Doubt OI pneumonia given HIV status            Interval History:   Intubated and sedated in ICU, 65% FiO2. No + cxs              Medications:       sodium chloride  5 mL Nebulization Q4H WA     albuterol  2.5 mg Nebulization Q4H While awake     heparin  5,000 Units Subcutaneous BID     B and C vitamin Complex with folic acid  5 mL Per Feeding Tube Daily     protein modular  1 packet Per Feeding Tube TID     insulin aspart  1-6 Units Subcutaneous Q4H     sodium chloride (PF)  10 mL Intracatheter Q7 Days     pantoprazole  40 mg Intravenous QAM AC     gabapentin  300 mg  "Oral QAM     gabapentin  600 mg Oral QPM     piperacillin-tazobactam  2.25 g Intravenous Q6H     vancomycin place koenig - receiving intermittent dosing  1 each Does not apply See Admin Instructions     levofloxacin  500 mg Intravenous Q48H     - MEDICATION INSTRUCTIONS for Dialysis Patients -   Does not apply See Admin Instructions     iopamidol  100 mL Intravenous Once     sodium chloride (PF)  3 mL Intracatheter Q8H     abacavir  600 mg Oral QPM     aspirin chewable tablet 81 mg  81 mg Oral QPM     calcium acetate  2,001 mg Oral TID w/meals     chlorhexidine  15 mL Swish & Spit BID     clopidogrel (PLAVIX) tablet 75 mg  75 mg Oral QPM     dapsone  100 mg Oral At Bedtime     darunavir  800 mg Oral At Bedtime     dolutegravir  50 mg Oral At Bedtime     magnesium oxide (MAG-OX) tablet 400 mg  400 mg Oral At Bedtime     mycophenolate  500 mg Oral BID     ritonavir  100 mg Oral At Bedtime                  Physical Exam:   Blood pressure 150/72, pulse 94, temperature 98.8  F (37.1  C), temperature source Axillary, resp. rate 15, height 1.803 m (5' 11\"), weight 87.1 kg (192 lb 0.3 oz), SpO2 97 %.  Wt Readings from Last 2 Encounters:   01/08/17 87.1 kg (192 lb 0.3 oz)   11/28/16 88.905 kg (196 lb)     Vital Signs with Ranges  Temp:  [98  F (36.7  C)-99.5  F (37.5  C)] 98.8  F (37.1  C)  Heart Rate:  [62-74] 65  Resp:  [9-36] 15  BP: (122-170)/(51-85) 150/72 mmHg  FiO2 (%):  [45 %-65 %] 65 %  SpO2:  [86 %-97 %] 97 %    Constitutional: Intubated/sedated   Lungs: Congestion to auscultation bilaterally, no crackles or wheezing   Cardiovascular: Regular rate and rhythm, normal S1 and S2, and no murmur noted   Abdomen: Normal bowel sounds, soft, non-distended, non-tender   Skin: No rashes, no cyanosis, no edema   Other:           Data:   All microbiology laboratory data reviewed.  Recent Labs   Lab Test  01/09/17   0544  01/08/17   1415  01/08/17   0636  01/07/17   2105   WBC  5.3   --   4.7  3.9*   HGB  6.5*  6.9*  6.9*  6.0* "   HCT  21.0*   --   21.6*  18.9*   MCV  90   --   90  90   PLT  112*   --   93*  86*     Recent Labs   Lab Test  01/09/17   0544  01/08/17   0636  01/07/17   0530   CR  7.42*  5.64*  7.81*     No lab results found.  Recent Labs   Lab Test  01/07/17   1150  01/05/17 2012 01/05/17   2000  05/19/16   1730  03/05/16   2248  03/05/16   2247  03/05/16   2204  03/05/16   1115  07/06/14   2330   CULT  Culture negative monitoring continues  Incorrectly ordered by PCU/Clinic Canceled, Test credited ordered sputum culture   instead.    No growth after 4 days  No growth after 4 days  No growth  Test canceled by physician Duplicate request Charge credited  Test canceled by physician Duplicate request Charge credited  Test canceled by physician Duplicate request Charge credited  Test canceled by physician Duplicate request Charge credited  No growth  No growth  Charge credited Specimen not received Test canceled by PCU/Clinic  No growth

## 2017-01-09 NOTE — CONSULTS
"CLINICAL NUTRITION SERVICES  -  ASSESSMENT NOTE      Recommendations Ordered by Registered Dietitian (RD):   -Nepro @ 40 ml/hr x 24 hours + 3 pkts prostat/day via NGT  -Free water flushes = 60 ml q 4 hours  -Nephronex      Malnutrition: Patient does not meet criteria at this time          REASON FOR ASSESSMENT  Donald Raza is a 66 year old male seen by Registered Dietitian for Provider Order - Registered Dietitian to Assess and Order TF per Medical Nutrition protocol      NUTRITION HISTORY  - Information obtained from family  - Family reports patient with good appetite/intake prior to admission with no recent changes.   - Patient with significant medical history of HIV, ESRD on HD ([facility]), CAD, HTN, DM2 (insulin requiring) and chronic anemia.       CURRENT NUTRITION ORDERS  Diet Order:     NPO     Current Intake/Tolerance:  -Patient on a dialysis diet from 1/3 to 1/4 and then a moderate consistent CHO diet from 1/5 to 1/6. Patient was then intubated on 1/6 and made NPO. While on an oral diet patient was eating % of meals      PHYSICAL FINDINGS  Observed  -NGT intact   Obtained from Chart/Interdisciplinary Team  -Extubated (1/9), then reintubated   -Last documented BM = 1/6    ANTHROPOMETRICS  Height: 5' 11\"  Weight: 192 lbs .33 oz (87.1 kg)  Dry weight of admission (1/5): 85 kg   Body mass index is 26.79 kg/(m^2).  Weight Status:  Overweight BMI 25-29.9  IBW: 78.18 kg   % IBW: 109%  Weight History: The data below suggests that patient's weight fluctuates at 86 kg +/- 1 kg   Wt Readings from Last 10 Encounters:   01/08/17 87.1 kg (192 lb 0.3 oz)   11/28/16 88.905 kg (196 lb)   11/28/16 86.183 kg (190 lb)   11/07/16 86.183 kg (190 lb)   09/19/16 87.181 kg (192 lb 3.2 oz)   08/29/16 88.587 kg (195 lb 4.8 oz)   08/18/16 85.186 kg (187 lb 12.8 oz)   08/15/16 87 kg (191 lb 12.8 oz)   08/15/16 87.091 kg (192 lb)   07/11/16 88.179 kg (194 lb 6.4 oz)   ]    LABS  Labs reviewed  -Phos (1/8) - 5.1 mg/dL (H)  -K+ - " 4.1 mmol/L (wnl)  -BGs - 102-117 mg/dL (appropriate)    MEDICATIONS  Medications reviewed  -Phoslo with meals TID   -Cellcept - 500 mg BID  -Propofol @ 18.7 ml/hr (494 kcals from lipids)    Dosing Weight: 85 kg (dry weight)    ASSESSED NUTRITION NEEDS:  Estimated Energy Needs: 9504-8412 kcals (25-30 Kcal/Kg)  Justification: maintenance  Estimated Protein Needs: 102-170 grams protein (1.2-2 g pro/Kg)  Justification: hypercatabolism with critical illness and dialysis  Estimated Fluid Needs: 3582-9135  mL (1 mL/Kcal)  Justification: maintenance and per provider pending fluid status    MALNUTRITION:  % Weight Loss:  None noted  % Intake:  Decreased intake does not meet criteria for malnutrition   Subcutaneous Fat Loss:  None observed  Muscle Loss:  None observed  Fluid Retention:  None noted    Malnutrition Diagnosis: Patient does not meet two of the above criteria necessary for diagnosing malnutrition    NUTRITION DIAGNOSIS:  Inadequate protein-energy intake related to inability for PO secondary to intubation/sedation as evidenced by current intake (propofol) providing 23% of estimated energy needs and 0% of estimated protein needs     NUTRITION INTERVENTIONS  Recommendations / Nutrition Prescription  1. Recommend initiation of EN via NGT per the following regimen/provisions:     Goal EN Regimen/Provisions: Nepro @ 40 ml/hr x 24 hours (960 ml) + 3 pkts prostat to provide 1728 kcals (20 kcal/kg - 79% of estimated energy needs), 123 g protein (1.4 g/kg), 155 g CHO, 12 g fiber, 691 mg Phos, 1018 mg K+, 701 ml free water and 100% of DRIs.    Total intake (EN + propofol @ 18.7 ml/hr) = 2222 kcals (26 kcal/kg - 100% of estimated energy needs)    Advancement Schedule: Start at 10 ml/hr x 6 hours. If no s/sx of intolerance, advance by 10 ml q 6 hours to goal rate.     2. Free water flushes = 60 ml q 4 hours  3. Renal MVI+mineral       Implementation  Nutrition education: No education needs assessed at this time. Discussed EN  with family    EN Composition, EN Schedule, Feeding Tube Flush - Entered EN regimen/provisions and water flushes as indicated above    Multivitamin/Mineral - Ordered renal MVI+mineral (Nephronex)    Collaboration and Referral of Nutrition care - Discussed patient during interdisciplinary rounds. Also spoke with RN regarding initiation of EN     Nutrition Goals  EN to advance to goal rate in the next 24 hours     MONITORING AND EVALUATION:  Enteral Nutrition intake - Initiation / advancement and tolerance (stooling, biochemical review, weight trends)  Procedures - Extubation    Marianne Mendoza RD, LD  Clinical Dietitian  3rd Floor/ICU Pager: [phone]  All Other Floors Pager: [phone]  Weekend/Holiday Pager: [phone]

## 2017-01-09 NOTE — PLAN OF CARE
Problem: Restraint for Non-Violent/Non-Self-Destructive Behavior  Goal: Prevent/Manage Potential Problems  Maintain safety of patient and others during period of restraint.  Promote psychological and physical wellbeing.  Prevent injury to skin and involved body parts.  Promote nutrition, hydration, and elimination.   soft restraints restraints continued 1/9/2017    Clinical Justification: Pulling lines, pulling tubes, and pulling equipment  Less Restrictive Alternative: 1:1 patient care, Repositioning, Re-evaluate equipment, Disguise equipment, Pain management, Alarm, De-escalation, Reorientation  Attending Physician Notified: MD ordered restraint,     New orders placed Yes  Length of Order: 1 Day      Medina Skinner

## 2017-01-09 NOTE — PROGRESS NOTES
SPIRITUAL HEALTH SERVICES Progress Note  [facility]     Attempted to follow up with Donald since his transfer to the ICU. At the first attempt, Donald was receiving a treatment, then at the second he had been re-intubated and family had left. I will continue to follow up tomorrow.     Stewart Grace  Chaplain Resident  Pager [phone]

## 2017-01-09 NOTE — PLAN OF CARE
Problem: Restraint for Non-Violent/Non-Self-Destructive Behavior  Goal: Prevent/Manage Potential Problems  Maintain safety of patient and others during period of restraint.  Promote psychological and physical wellbeing.  Prevent injury to skin and involved body parts.  Promote nutrition, hydration, and elimination.   soft restraints restraints discontinued [id number] on 1/9/2017.    Restraint discontinue criteria met, patient is calm, cooperative and safe. Restraints removed.     Patient's Response: No evidence of learning  Family Notification: Other  Attending Physician Notified: MD ordered restraint,      Medina Skinner

## 2017-01-09 NOTE — PLAN OF CARE
Problem: Goal Outcome Summary  Goal: Goal Outcome Summary  Outcome: No Change  ICU End of Shift Summary.  For vital signs and complete assessments, please see documentation flowsheets.     Pertinent assessments: Arouses to voice throughout night. Tmax 99.5  Major Shift Events: Rested well throughout night. Sedation vacation performed. Vent Wean. Picc dressing changed.   Plan (Upcoming Events): Possible dialysis- day by day assessment for need. Continue antibiotics. Possible extubation today.  Discharge/Transfer Needs:     Bedside Shift Report Completed   Bedside Safety Check Completed

## 2017-01-09 NOTE — PROGRESS NOTES
RT- Patient was on a PS trial this morning 7/5 from 3674-4089 and 5/5 from 9711-5131. Was then extubated to initially a nasal cannula but was requiring more oxygen and switched to a 15L oxymask. Patient had a very congested cough post extubated and wasn't able to cough anything up. NT suctioned patient twice after talking to MD and was able to get a small amount of secretions up. Was reintubated about 1210. Size 8 ETT secured 22 @ the teeth. BS coarse. Started Albuterol and hypertonic saline nebulizers Q4 w/a, patient is also on a heated vent circuit. Suctioned out large amounts of thick tan-yellow secretions after reintubation. Ventilation Mode: CMV/AC  FiO2 (%): 65 %  Rate Set (breaths/minute): 12 breaths/min  Tidal Volume Set (mL): 500 mL  PEEP (cm H2O): 5 cmH2O  Pressure Support (cm H2O): 5 cmH2O  Oxygen Concentration (%): 65 %  Resp: 15    Will continue to monitor patient.     Leilani Snider , RT  January 9, 2017 5:48 PM

## 2017-01-09 NOTE — PLAN OF CARE
Problem: Restraint for Non-Violent/Non-Self-Destructive Behavior  Goal: Prevent/Manage Potential Problems  Maintain safety of patient and others during period of restraint.  Promote psychological and physical wellbeing.  Prevent injury to skin and involved body parts.  Promote nutrition, hydration, and elimination.   Soft wrist restraints continued 1/9/2017    Clinical Justification: Pulling lines, pulling tubes, and pulling equipment  Less Restrictive Alternative: 1:1 patient care, Repositioning, Re-evaluate equipment, Disguise equipment        New orders placed No  Length of Order: 1 Day      Geetha Garibay

## 2017-01-09 NOTE — PROGRESS NOTES
Renal Medicine Progress Note                                Donald Raaz MRN# [id number]   Age: 66 year old YOB: 1950   Date of Admission: 1/3/2017 Hospital LOS: 6                  Assessment/Plan:     Admitted with CP.  Developed respiratory failure secondary to pneumonia      1.  Respiratory Failure:  Due to pneumonia.  Cx unrevealing so far.  On broad spectrum abx.      2.  Septic Shock:  Also due to pneumonia.  Stabilizing.    3.  HIV:  On HAART.    4. ESRD: HD yesterday.  2 L removed.  K normal.  Volume status looks OK today.    5.  Anemia:  HGB 6.9.  On EPO 10K.            Plan dialysis 01/10/17        Interval History:     Now extubated but somewhat confused.  No apparent CP or SOB.  Follows     ROS:     GENERAL: NAD, No fever,chills  R: NEGATIVE for significant cough or SOB  CV: NEGATIVE for chest pain, palpitations  EXT: no change edema  ROS otherwise negative    Medications and Allergies:     Reviewed    Physical Exam:     Vitals were reviewed  Patient Vitals for the past 8 hrs:   BP Temp Temp src Heart Rate Resp SpO2   01/09/17 1000 156/85 mmHg - - 72 21 91 %   01/09/17 0925 146/64 mmHg - - 70 - 94 %   01/09/17 0900 146/64 mmHg - - 68 14 93 %   01/09/17 0800 147/67 mmHg 99.2  F (37.3  C) Axillary 69 20 90 %   01/09/17 0630 - - - 68 15 92 %   01/09/17 0615 - - - 70 14 91 %   01/09/17 0610 - - - 68 10 93 %   01/09/17 0605 - - - 70 18 93 %   01/09/17 0600 - - - 74 24 90 %   01/09/17 0536 - - - 65 - -   01/09/17 0530 - 98  F (36.7  C) Axillary 62 13 93 %   01/09/17 0515 143/64 mmHg - - 62 13 92 %     I/O last 3 completed shifts:  In: 1519.63 [I.V.:1199.63; NG/GT:320]  Out: 0     Filed Vitals:    01/06/17 2200 01/07/17 0500 01/07/17 0745 01/08/17 0346   Weight: 89 kg (196 lb 3.4 oz) 88.2 kg (194 lb 7.1 oz) 88.2 kg (194 lb 7.1 oz) 86.8 kg (191 lb 5.8 oz)    01/08/17 5589   Weight: 87.1 kg (192 lb 0.3 oz)         GENERAL: confused  HEENT: NC/AT, PERRLA, EOMI, non icteric, pharynx moist without  lesion  NECK: supple, no masses or adenopathy  RESP: symmetric excursion, good effort, lungs clear - no rales, rhonchi or wheezes  RESP:  clear anteriorly  CV: RRR, normal S1 S2  ABDOMEN: soft, nontender, no HSM or masses and bowel sounds normal  MS: no clubbing, cyanosis   SKIN: clear without significant rashes or lesions  EXT: warm, no edema      Data:       Recent Labs  Lab 01/09/17  0544 01/08/17  0636 01/07/17  1411 01/07/17  0530 01/05/17 2000    136  --  133 137   POTASSIUM 4.1 4.5 3.8 5.5* 3.9   CHLORIDE 98 97  --  97 98   CO2 27 27  --  26 29   ANIONGAP 12 12  --  10 10   * 162*  --  143* 105*   BUN 48* 37*  --  53* 29   CR 7.42* 5.64*  --  7.81* 5.01*   GFRESTIMATED 7* 10*  --  7* 12*   GFRESTBLACK 9* 12*  --  8* 14*   PRABHA 8.1* 7.9*  --  8.3* 8.1*           Recent Labs  Lab 01/09/17  0544 01/08/17  1415 01/08/17  0636 01/07/17  2105   PHOS  --   --  5.1*  --    HGB 6.5* 6.9* 6.9* 6.0*         G David Fuller    [facility] - Nephrology  [phone]

## 2017-01-09 NOTE — PLAN OF CARE
Problem: Goal Outcome Summary  Goal: Goal Outcome Summary  Outcome: Declining  ICU End of Shift Summary.  For vital signs and complete assessments, please see documentation flowsheets.     Pertinent assessments: Patient failed extubated, required reintubation. Copious amounts of respiratory secretions after reintubation. Frequent coughing. Propofol restarted for patient comfort. Vitals stable. Plan to start tube feeding when formula arrives from dietary. Bilat wrist restraints on for safety.  Major Shift Events: Reintubation at 1225.   Plan (Upcoming Events): Continue to monitor. Start tube feeding.  Discharge/Transfer Needs: to be determined    Bedside Shift Report Completed yes  Bedside Safety Check Completed yes

## 2017-01-10 ENCOUNTER — APPOINTMENT (OUTPATIENT)
Dept: GENERAL RADIOLOGY | Facility: CLINIC | Age: 67
DRG: 286 | End: 2017-01-10
Attending: INTERNAL MEDICINE
Payer: MEDICARE

## 2017-01-10 LAB
ABO + RH BLD: NORMAL
ABO + RH BLD: NORMAL
ANION GAP SERPL CALCULATED.3IONS-SCNC: 13 MMOL/L (ref 3–14)
BACTERIA SPEC CULT: NO GROWTH
BASOPHILS # BLD AUTO: 0 10E9/L (ref 0–0.2)
BASOPHILS NFR BLD AUTO: 0.4 %
BLD GP AB SCN SERPL QL: NORMAL
BLD PROD TYP BPU: NORMAL
BLD UNIT ID BPU: 0
BLD UNIT ID BPU: 0
BLOOD BANK CMNT PATIENT-IMP: NORMAL
BLOOD PRODUCT CODE: NORMAL
BLOOD PRODUCT CODE: NORMAL
BPU ID: NORMAL
BPU ID: NORMAL
BUN SERPL-MCNC: 57 MG/DL (ref 7–30)
CALCIUM SERPL-MCNC: 8.9 MG/DL (ref 8.5–10.1)
CHLORIDE SERPL-SCNC: 97 MMOL/L (ref 94–109)
CO2 SERPL-SCNC: 26 MMOL/L (ref 20–32)
CREAT SERPL-MCNC: 8.87 MG/DL (ref 0.66–1.25)
DIFFERENTIAL METHOD BLD: ABNORMAL
EOSINOPHIL # BLD AUTO: 0.2 10E9/L (ref 0–0.7)
EOSINOPHIL NFR BLD AUTO: 4.1 %
ERYTHROCYTE [DISTWIDTH] IN BLOOD BY AUTOMATED COUNT: 15.4 % (ref 10–15)
GFR SERPL CREATININE-BSD FRML MDRD: 6 ML/MIN/1.7M2
GLUCOSE BLDC GLUCOMTR-MCNC: 100 MG/DL (ref 70–99)
GLUCOSE BLDC GLUCOMTR-MCNC: 102 MG/DL (ref 70–99)
GLUCOSE BLDC GLUCOMTR-MCNC: 115 MG/DL (ref 70–99)
GLUCOSE BLDC GLUCOMTR-MCNC: 154 MG/DL (ref 70–99)
GLUCOSE BLDC GLUCOMTR-MCNC: 181 MG/DL (ref 70–99)
GLUCOSE BLDC GLUCOMTR-MCNC: 96 MG/DL (ref 70–99)
GLUCOSE SERPL-MCNC: 104 MG/DL (ref 70–99)
HCT VFR BLD AUTO: 20.5 % (ref 40–53)
HGB BLD-MCNC: 6.4 G/DL (ref 13.3–17.7)
IMM GRANULOCYTES # BLD: 0.1 10E9/L (ref 0–0.4)
IMM GRANULOCYTES NFR BLD: 2.1 %
LDH SERPL L TO P-CCNC: 391 U/L (ref 85–227)
LYMPHOCYTES # BLD AUTO: 1.1 10E9/L (ref 0.8–5.3)
LYMPHOCYTES NFR BLD AUTO: 20.5 %
MAGNESIUM SERPL-MCNC: 2.8 MG/DL (ref 1.6–2.3)
MCH RBC QN AUTO: 28.2 PG (ref 26.5–33)
MCHC RBC AUTO-ENTMCNC: 31.2 G/DL (ref 31.5–36.5)
MCV RBC AUTO: 90 FL (ref 78–100)
MICRO REPORT STATUS: NORMAL
MONOCYTES # BLD AUTO: 0.6 10E9/L (ref 0–1.3)
MONOCYTES NFR BLD AUTO: 10.5 %
NEUTROPHILS # BLD AUTO: 3.3 10E9/L (ref 1.6–8.3)
NEUTROPHILS NFR BLD AUTO: 61.4 %
NRBC # BLD AUTO: 0.1 10*3/UL
NRBC BLD AUTO-RTO: 1 /100
NUM BPU REQUESTED: 2
PLATELET # BLD AUTO: 127 10E9/L (ref 150–450)
POTASSIUM SERPL-SCNC: 4.4 MMOL/L (ref 3.4–5.3)
RBC # BLD AUTO: 2.27 10E12/L (ref 4.4–5.9)
SODIUM SERPL-SCNC: 136 MMOL/L (ref 133–144)
SPECIMEN EXP DATE BLD: NORMAL
SPECIMEN SOURCE: NORMAL
TRANSFUSION STATUS PATIENT QL: NORMAL
VANCOMYCIN SERPL-MCNC: 27.9 MG/L
WBC # BLD AUTO: 5.3 10E9/L (ref 4–11)

## 2017-01-10 PROCEDURE — 25000125 ZZHC RX 250: Performed by: INTERNAL MEDICINE

## 2017-01-10 PROCEDURE — 20000003 ZZH R&B ICU

## 2017-01-10 PROCEDURE — 94640 AIRWAY INHALATION TREATMENT: CPT | Mod: 76

## 2017-01-10 PROCEDURE — 25000132 ZZH RX MED GY IP 250 OP 250 PS 637: Mod: GY | Performed by: INTERNAL MEDICINE

## 2017-01-10 PROCEDURE — 25000308 HC RX OP HPI UCR WEL MED 250 IP 250: Performed by: SURGERY

## 2017-01-10 PROCEDURE — 40000275 ZZH STATISTIC RCP TIME EA 10 MIN

## 2017-01-10 PROCEDURE — 83735 ASSAY OF MAGNESIUM: CPT | Performed by: INTERNAL MEDICINE

## 2017-01-10 PROCEDURE — 90937 HEMODIALYSIS REPEATED EVAL: CPT

## 2017-01-10 PROCEDURE — 85025 COMPLETE CBC W/AUTO DIFF WBC: CPT | Performed by: INTERNAL MEDICINE

## 2017-01-10 PROCEDURE — A9270 NON-COVERED ITEM OR SERVICE: HCPCS | Mod: GY | Performed by: INTERNAL MEDICINE

## 2017-01-10 PROCEDURE — 99233 SBSQ HOSP IP/OBS HIGH 50: CPT | Performed by: INTERNAL MEDICINE

## 2017-01-10 PROCEDURE — 83615 LACTATE (LD) (LDH) ENZYME: CPT | Performed by: INTERNAL MEDICINE

## 2017-01-10 PROCEDURE — 80202 ASSAY OF VANCOMYCIN: CPT | Performed by: INTERNAL MEDICINE

## 2017-01-10 PROCEDURE — 99291 CRITICAL CARE FIRST HOUR: CPT | Performed by: INTERNAL MEDICINE

## 2017-01-10 PROCEDURE — 86850 RBC ANTIBODY SCREEN: CPT | Performed by: INTERNAL MEDICINE

## 2017-01-10 PROCEDURE — 71010 XR CHEST PORT 1 VW: CPT

## 2017-01-10 PROCEDURE — 94640 AIRWAY INHALATION TREATMENT: CPT

## 2017-01-10 PROCEDURE — 94003 VENT MGMT INPAT SUBQ DAY: CPT

## 2017-01-10 PROCEDURE — 80048 BASIC METABOLIC PNL TOTAL CA: CPT | Performed by: INTERNAL MEDICINE

## 2017-01-10 PROCEDURE — 63400005 ZZH RX 634: Performed by: INTERNAL MEDICINE

## 2017-01-10 PROCEDURE — 25000125 ZZHC RX 250: Performed by: ANESTHESIOLOGY

## 2017-01-10 PROCEDURE — 86900 BLOOD TYPING SEROLOGIC ABO: CPT | Performed by: INTERNAL MEDICINE

## 2017-01-10 PROCEDURE — 87449 NOS EACH ORGANISM AG IA: CPT | Performed by: INTERNAL MEDICINE

## 2017-01-10 PROCEDURE — 86901 BLOOD TYPING SEROLOGIC RH(D): CPT | Performed by: INTERNAL MEDICINE

## 2017-01-10 PROCEDURE — 25000128 H RX IP 250 OP 636: Performed by: INTERNAL MEDICINE

## 2017-01-10 PROCEDURE — 00000146 ZZHCL STATISTIC GLUCOSE BY METER IP

## 2017-01-10 PROCEDURE — 25000132 ZZH RX MED GY IP 250 OP 250 PS 637: Mod: GY | Performed by: SURGERY

## 2017-01-10 PROCEDURE — P9040 RBC LEUKOREDUCED IRRADIATED: HCPCS | Performed by: INTERNAL MEDICINE

## 2017-01-10 PROCEDURE — 86923 COMPATIBILITY TEST ELECTRIC: CPT | Performed by: INTERNAL MEDICINE

## 2017-01-10 RX ADMIN — ALBUTEROL SULFATE 2.5 MG: 2.5 SOLUTION RESPIRATORY (INHALATION) at 19:33

## 2017-01-10 RX ADMIN — ALBUTEROL SULFATE 2.5 MG: 2.5 SOLUTION RESPIRATORY (INHALATION) at 23:33

## 2017-01-10 RX ADMIN — SODIUM CHLORIDE SOLN NEBU 3% 5 ML: 3 NEBU SOLN at 23:33

## 2017-01-10 RX ADMIN — RITONAVIR 100 MG: 100 TABLET, FILM COATED ORAL at 21:29

## 2017-01-10 RX ADMIN — MYCOPHENOLATE MOFETIL 500 MG: 250 CAPSULE ORAL at 11:48

## 2017-01-10 RX ADMIN — HYDROMORPHONE HYDROCHLORIDE 0.5 MG: 10 INJECTION, SOLUTION INTRAMUSCULAR; INTRAVENOUS; SUBCUTANEOUS at 08:30

## 2017-01-10 RX ADMIN — ASPIRIN 81 MG 81 MG: 81 TABLET ORAL at 21:26

## 2017-01-10 RX ADMIN — HYDROMORPHONE HYDROCHLORIDE 0.5 MG: 10 INJECTION, SOLUTION INTRAMUSCULAR; INTRAVENOUS; SUBCUTANEOUS at 05:53

## 2017-01-10 RX ADMIN — LEVOFLOXACIN 500 MG: 5 INJECTION, SOLUTION INTRAVENOUS at 21:23

## 2017-01-10 RX ADMIN — PROPOFOL 35 MCG/KG/MIN: 10 INJECTION, EMULSION INTRAVENOUS at 09:36

## 2017-01-10 RX ADMIN — PROPOFOL 35 MCG/KG/MIN: 10 INJECTION, EMULSION INTRAVENOUS at 15:04

## 2017-01-10 RX ADMIN — HEPARIN SODIUM 5000 UNITS: 5000 INJECTION, SOLUTION INTRAVENOUS; SUBCUTANEOUS at 11:48

## 2017-01-10 RX ADMIN — CALCIUM ACETATE 2001 MG: 667 CAPSULE ORAL at 17:06

## 2017-01-10 RX ADMIN — TAZOBACTAM SODIUM AND PIPERACILLIN SODIUM 2.25 G: 250; 2 INJECTION, SOLUTION INTRAVENOUS at 17:06

## 2017-01-10 RX ADMIN — CLOPIDOGREL 75 MG: 75 TABLET, FILM COATED ORAL at 21:25

## 2017-01-10 RX ADMIN — SODIUM CHLORIDE SOLN NEBU 3% 5 ML: 3 NEBU SOLN at 03:15

## 2017-01-10 RX ADMIN — TAZOBACTAM SODIUM AND PIPERACILLIN SODIUM 2.25 G: 250; 2 INJECTION, SOLUTION INTRAVENOUS at 11:49

## 2017-01-10 RX ADMIN — DAPSONE 100 MG: 25 TABLET ORAL at 21:25

## 2017-01-10 RX ADMIN — TAZOBACTAM SODIUM AND PIPERACILLIN SODIUM 2.25 G: 250; 2 INJECTION, SOLUTION INTRAVENOUS at 05:53

## 2017-01-10 RX ADMIN — PROPOFOL 35 MCG/KG/MIN: 10 INJECTION, EMULSION INTRAVENOUS at 19:51

## 2017-01-10 RX ADMIN — CHLORHEXIDINE GLUCONATE 15 ML: 1.2 RINSE ORAL at 11:49

## 2017-01-10 RX ADMIN — Medication: at 11:50

## 2017-01-10 RX ADMIN — SODIUM CHLORIDE SOLN NEBU 3% 5 ML: 3 NEBU SOLN at 07:16

## 2017-01-10 RX ADMIN — Medication: at 17:06

## 2017-01-10 RX ADMIN — CALCIUM ACETATE 2001 MG: 667 CAPSULE ORAL at 11:47

## 2017-01-10 RX ADMIN — GABAPENTIN 600 MG: 250 SUSPENSION ORAL at 22:11

## 2017-01-10 RX ADMIN — PROPOFOL 35 MCG/KG/MIN: 10 INJECTION, EMULSION INTRAVENOUS at 03:48

## 2017-01-10 RX ADMIN — MAGNESIUM OXIDE TAB 400 MG (241.3 MG ELEMENTAL MG) 400 MG: 400 (241.3 MG) TAB at 21:26

## 2017-01-10 RX ADMIN — ALBUTEROL SULFATE 2.5 MG: 2.5 SOLUTION RESPIRATORY (INHALATION) at 03:15

## 2017-01-10 RX ADMIN — ALBUTEROL SULFATE 2.5 MG: 2.5 SOLUTION RESPIRATORY (INHALATION) at 07:16

## 2017-01-10 RX ADMIN — DARUNAVIR 800 MG: 800 TABLET, FILM COATED ORAL at 21:29

## 2017-01-10 RX ADMIN — Medication 1 PACKET: at 21:31

## 2017-01-10 RX ADMIN — Medication 5 ML: at 11:48

## 2017-01-10 RX ADMIN — ABACAVIR 600 MG: 300 TABLET ORAL at 21:27

## 2017-01-10 RX ADMIN — EPOETIN ALFA 5000 UNITS: 3000 SOLUTION INTRAVENOUS; SUBCUTANEOUS at 08:49

## 2017-01-10 RX ADMIN — HYDROMORPHONE HYDROCHLORIDE 0.5 MG: 10 INJECTION, SOLUTION INTRAMUSCULAR; INTRAVENOUS; SUBCUTANEOUS at 12:18

## 2017-01-10 RX ADMIN — MYCOPHENOLATE MOFETIL 500 MG: 250 CAPSULE ORAL at 21:25

## 2017-01-10 RX ADMIN — ALBUTEROL SULFATE 2.5 MG: 2.5 SOLUTION RESPIRATORY (INHALATION) at 11:23

## 2017-01-10 RX ADMIN — SODIUM CHLORIDE 250 ML: 9 INJECTION, SOLUTION INTRAVENOUS at 08:51

## 2017-01-10 RX ADMIN — HEPARIN SODIUM 5000 UNITS: 5000 INJECTION, SOLUTION INTRAVENOUS; SUBCUTANEOUS at 21:25

## 2017-01-10 RX ADMIN — ALBUTEROL SULFATE 2.5 MG: 2.5 SOLUTION RESPIRATORY (INHALATION) at 15:48

## 2017-01-10 RX ADMIN — SODIUM CHLORIDE SOLN NEBU 3% 5 ML: 3 NEBU SOLN at 19:34

## 2017-01-10 RX ADMIN — PANTOPRAZOLE SODIUM 40 MG: 40 INJECTION, POWDER, FOR SOLUTION INTRAVENOUS at 05:53

## 2017-01-10 RX ADMIN — GABAPENTIN 300 MG: 250 SUSPENSION ORAL at 11:48

## 2017-01-10 RX ADMIN — CHLORHEXIDINE GLUCONATE 15 ML: 1.2 RINSE ORAL at 21:25

## 2017-01-10 RX ADMIN — SODIUM CHLORIDE SOLN NEBU 3% 5 ML: 3 NEBU SOLN at 11:23

## 2017-01-10 RX ADMIN — SODIUM CHLORIDE SOLN NEBU 3% 5 ML: 3 NEBU SOLN at 15:48

## 2017-01-10 NOTE — PHARMACY-VANCOMYCIN DOSING SERVICE
Pharmacy Vancomycin Note  Date of Service January 10, 2017  Patient's  1950   66 year old, male    Indication: Acute respiratory failure secondary likely to bilateral pneumonia - possible HCAP as developed while hospitalized.  Goal Trough Level: 15-20 mg/L  Day of Therapy: 5  Current Vancomycin regimen:  intermittent    Current estimated CrCl = Estimated Creatinine Clearance: 9.9 mL/min (based on Cr of 8.87).    Creatinine for last 3 days  2017:  6:36 AM Creatinine 5.64 mg/dL*  2017:  5:44 AM Creatinine 7.42 mg/dL*  1/10/2017:  3:45 AM Creatinine 8.87 mg/dL*    Recent Vancomycin Levels (past 3 days)  2017:  6:36 AM Vancomycin Level 12.9 mg/L  1/10/2017:  7:30 AM Vancomycin Level 27.9 mg/L*    Vancomycin IV Administrations (past 72 hours)                   vancomycin (VANCOCIN) 1,750 mg in NaCl 0.9 % 250 mL intermittent infusion (mg) 1,750 mg New Bag 17 1642                Nephrotoxins and other renal medications (Future)    Start     Dose/Rate Route Frequency Ordered Stop    17 2215  piperacillin-tazobactam (ZOSYN) infusion 2.25 g     Comments:  Pharmacy can adjust dose based on renal function.    2.25 g  100 mL/hr over 30 Minutes Intravenous EVERY 6 HOURS 17 2206      17 2212  vancomycin place koenig - receiving intermittent dosing      1 each Does not apply SEE ADMIN INSTRUCTIONS 17 2212               Contrast Orders - past 72 hours     None          Interpretation of levels and current regimen:  Trough level is  Supratherapeutic    Renal Function: ESRD on Dialysis    Plan:  1.  No Vanco today.  2.  Pharmacy will check trough levels as appropriate: tomorrow m.    3.  Pharmacy will continue to follow closely.     Adriana Tolbert        .

## 2017-01-10 NOTE — PROGRESS NOTES
SPIRITUAL HEALTH SERVICES Progress Note  [facility]     Attempted to follow up on a request for a , but was unable to get in touch with family over the phone or in person. [facility] will continue to follow up in order to arrange a visit from a .    Stewart Grace  Chaplain Resident  Pager [phone]

## 2017-01-10 NOTE — CONSULTS
NUTRITION BRIEF NOTE    MD consult received - TF Assess and Order  See RD note 1/9 for complete assessment details.    TF initiated 1/9, Nepro currently at 30 mL/hr, advancing towards goal of 40 mL/hr.    Will continue to follow per protocol. Please page/consult as needed.      WILMER Cabral  3rd floor/ICU: [phone]  All other floors: [phone]  Weekend/holiday: [phone]  Office: [phone]

## 2017-01-10 NOTE — PROGRESS NOTES
[facility]  Hospitalist Progress Note  Nelson Worthy MD 01/10/2017    Reason for Stay (Diagnosis): acute resp failure, PNA         Assessment and Plan:      Summary of Stay: Donald Raza is a 66 year old male with complex medical hx of HAART for hx of HIV, ESRD on HD, CAD, Hypertension, DM type 2 on insulin, chronic anemia  admitted on 1/3/2017 with episodes of chest pain and was determined non cardiac in etiology given heart cath findings this admit. Hospital course then complicated by intermittent fever spikes ( similar to previous hospitalizations) and increasing O2 requirements that eventually led to severe respiratory distress, unresponsiveness and a CODE blue on 1/7 resulting in intubation/ventilation.  Course complicated by failed attempt at extubation on 1/9 with re-intubation 3 hours later.      Problem List:    1. Acute respiratory failure secondary likely to bilateral pneumonia - possible HCAP as developed while hospitalized. Remains on vanco, zosyn, levaquin.  Attempted extubation on 1/9; extubated for several hours but was quickly reintubated again as pt developed copious secretions he could not clear.  cx without growth.  Doubt opportunistic infection as CD4 count adequate and no detectable viral load  2. Fever spikes suspect from #1  3. Worsening anemia on top of chronic anemia.  transfuse 2 units during HD today.  Likely AOCD; no e/o acute blood loss  4. ESRD on HD - dialyzes tues/thurs/sat  5. HIV on HAART.  CD4 count 260 and non-detectable viral load per ID note from 3 weeks PTA; therefore opportunistic infection unlikely  6. DM type 2 insulin requiring - on ISS prn  7. CAD with coronary angiogram this admission showing no sig CAD; no intervention done.  TTE this admit shows normal LV ftn  8. Hypertension hx  9. Septic shock requiring vasopressors with earlier fever, findings of HCAP, tachypnea, alteration in mental state, now off pressors  10. Thrombocytopenia likely  "sepsis related  11. Abnormal LFT's- suspect from shock liver given earlier hypotension and bradycardia.   Improved.  Hold statin  12. Nutrition:  Nutrition consulted for TF        DVT Prophylaxis: heparin sq  Code Status: Full Code  Discharge Dispo: to be determined        Interval History (Subjective):      Unable, intubated                  Physical Exam:      Last Vital Signs:  /65 mmHg  Pulse 61  Temp(Src) 98.3  F (36.8  C) (Axillary)  Resp 16  Ht 1.803 m (5' 11\")  Wt 87.8 kg (193 lb 9 oz)  BMI 27.01 kg/m2  SpO2 97%      Intake/Output Summary (Last 24 hours) at 01/10/17 1322  Last data filed at 01/10/17 1200   Gross per 24 hour   Intake 2192.79 ml   Output      0 ml   Net 2192.79 ml       Constitutional: Awake on vent, cooperative, no apparent distress.  Reacts to questions with thumbs up   Respiratory: Clear to auscultation bilaterally, no crackles or wheezing.  On vent   Cardiovascular: Regular rate and rhythm, normal S1 and S2, and no murmur noted   Abdomen: Normal bowel sounds, soft, non-distended, non-tender   Skin: No rashes, no cyanosis, dry to touch   Neuro: Alert and oriented x3, no weakness, numbness, memory loss   Extremities: No edema, normal range of motion   Other(s):        All other systems: Negative          Medications:      All current medications were reviewed with changes reflected in problem list.         Data:      All new lab and imaging data was reviewed.   Labs:    Recent Labs  Lab 01/10/17  0345   WBC 5.3   HGB 6.4*   HCT 20.5*   MCV 90   *      Imaging:   Recent Results (from the past 24 hour(s))   XR Chest Port 1 View    Narrative    XR CHEST PORT 1 VW 1/9/2017 10:11 PM    HISTORY: hypoxia    COMPARISON: 1/9/2017 at 1230 hours.    FINDINGS: Increased left lower lobe opacity compatible with infiltrate  and possibly associated pleural effusion which is increased since  earlier film at 1230 hours. Right lung is grossly clear. ET tube with  tip in the midline within 1 " cm of the jerry. Right PICC line with tip  at distal SVC. Enteric tube compatible with NG tube is seen extending  into the stomach, tip is difficult to visualize but possibly coiled in  the gastric fundus.         Impression    IMPRESSION:   Worsening left lower lobe infiltrate or atelectasis since 1230 hours.  ET tube is in the midline and just above jerry..    TEJINDER ROSAS MD

## 2017-01-10 NOTE — PROVIDER NOTIFICATION
Call placed to Tele hub regarding increased O2 needs-had been on 65% vent sats 91-94% but currently 89-90%.  Deep suctioned after lavaging, oral suction, insync with vent, breathing slightly over vent but appears comfortable, placed back in supine position with head up more.

## 2017-01-10 NOTE — PLAN OF CARE
Problem: Restraint for Non-Violent/Non-Self-Destructive Behavior  Goal: Prevent/Manage Potential Problems  Maintain safety of patient and others during period of restraint.  Promote psychological and physical wellbeing.  Prevent injury to skin and involved body parts.  Promote nutrition, hydration, and elimination.   Outcome: No Change  Right wrist and Left wrist restraints continued 1/10/2017    Clinical Justification: Pulling lines, pulling tubes, and pulling equipment  Less Restrictive Alternative: Repositioning, Pain management, Reorientation  Attending Physician Notified: MD ordered restraint,     New orders placed No  Length of Order: 1 Day      Danelle Gimenez

## 2017-01-10 NOTE — PROGRESS NOTES
Patient remained on vent settings as followed during the shift:  Ventilation Mode: CMV/AC  FiO2 (%): 60 %  Rate Set (breaths/minute): 12 breaths/min  Tidal Volume Set (mL): 500 mL  PEEP (cm H2O): 8 cmH2O  Pressure Support (cm H2O): 5 cmH2O  Oxygen Concentration (%): 70 %  Resp: 16      Breath sounds arecoarse, Suctioning out moderate amount of thick creamy secretions . Scheduled nebs given inline with the vent. Will continue to monitor patient.     Leilani Snider , RT  January 10, 2017 1:56 PM

## 2017-01-10 NOTE — PROGRESS NOTES
[facility] Intensive Care Progress Note    Date of Service (when I saw the patient): 01/10/2017     Assessment and Plan  Donald Raza is a 66 year old male with complex medical hx of HAART for hx of HIV, ESRD on HD, CAD, Hypertension, DM type 2 on insulin, chronic anemia  admitted on 1/3/2017 with episodes of chest pain and was determined non cardiac in etiology given recent heart cath findings. He was admitted to ICU for respiratory failure requiring intubation secondary to pneumonia.       Neurology:  Awake and communicative off sedation   Plan: .      Cardiovascular:  Septic shock requiring vasopressor. H/o CAD and multiple PCI. ECG unremarkable. He was started on Dopamine in setting of episode of bradycardia. Off pressors currently.   Plan:  TTE.      Pulmonary:        Hypoxemic respiratory failure requiring intubation on 1/7 most likely sec to PNA: febrile, bilateral LL infiltrates.    Plan: Continue broad spectrum ABX. Until all cultures available. PS trial.     Ventilation Mode: CMV/AC  FiO2 (%): 60 %  Rate Set (breaths/minute): 12 breaths/min  Tidal Volume Set (mL): 500 mL  PEEP (cm H2O): 8 cmH2O  Pressure Support (cm H2O): 5 cmH2O  Oxygen Concentration (%): 70 %  Resp: 16    Renal  Hyperkalemia on admission resolved. H/o ESRD on HD. HD on 1/7.   Plan: Monitor.     ID:         Sepsis, probable pneumonia: HIV on antiretroviral therapy. Temp improved, cultures NGTD; Agree with ID rationale, PJP unlikely. Bronchoscopy can be performed but would probably not be helpful for bacterial etiology given time of antibiotics  Plan: Continue broad spectrum antibiotics until all cultures available.     GI  No concerns.    Nutrition  Malnutrition.  Plan: nutrition consult for TF    Endocrine:  H/o DM type II.   Plan: Monitor and treat hyperglycemia.      Heme/Onc:  Acute on chronic anemia. Thrombocytopenia. No clear source of bleeding.2 unit PRBC for hgb 5,4 and then 61. on 1/7/17 . Hgb  6.9 today.  Plan: Hgb 6.5 today, transfuse 1 unit     DVT Prophylaxis: Pneumatic Compression Devices  GI Prophylaxis: PPI    Restraints: Restraints for medical healing needed: YES    Family update by me today: no.    Georgie Crump    Time Spent on This Encounter  Billing:  I spent 35 minutes bedside and on the inpatient unit today managing the critical care of Donald Raza in relation to the issues listed in this note.    Main Plans for Today  Nutrition consult  Ventilator recruitment maneuver     Physical Exam  Temp: 98.3  F (36.8  C) Temp src: Axillary Temp  Min: 97.6  F (36.4  C)  Max: 98.7  F (37.1  C) BP: 133/65 mmHg Pulse: 61 Heart Rate: 62 Resp: 16 SpO2: 97 % O2 Device: Mechanical Ventilator    Filed Vitals:    01/08/17 2359 01/10/17 0000 01/10/17 0730   Weight: 87.1 kg (192 lb 0.3 oz) 87.8 kg (193 lb 9 oz) 87.8 kg (193 lb 9 oz)     I/O last 3 completed shifts:  In: 1322.79 [I.V.:652.79; NG/GT:530]  Out: -     Neurologic: awake, grossly non-focal.   Cardiovascular: RRR, no murmurs and gallops, pulses present all 4 extremities.    Respiratory: breath sounds course bl, decrease bl LL.  on spontaneous breathing trial, comfortable  GI: soft, non-tender, non-distended.    Skin/Extremities: pale, no deformities or edema.    Lines: No erythema or discharge at entry site for PICC Line  Current lines are required for patient management    Medications    IV fluid REPLACEMENT ONLY       propofol (DIPRIVAN) infusion 35 mcg/kg/min (01/10/17 0936)     NaCl 10 mL/hr at 01/10/17 0800       [START ON 1/11/2017] pantoprazole  40 mg Per Feeding Tube Daily     heparin  5,000 Units Subcutaneous BID     B and C vitamin Complex with folic acid  5 mL Per Feeding Tube Daily     protein modular  1 packet Per Feeding Tube TID     aspirin chewable tablet 81 mg  81 mg Per Feeding Tube QPM     dapsone  100 mg Per Feeding Tube At Bedtime     gabapentin  300 mg Per Feeding Tube QAM     gabapentin  600 mg Per Feeding Tube QPM      magnesium oxide (MAG-OX) tablet 400 mg  400 mg Per Feeding Tube At Bedtime     sodium chloride  5 mL Nebulization Q4H     albuterol  2.5 mg Nebulization Q4H     insulin aspart  1-6 Units Subcutaneous Q4H     sodium chloride (PF)  10 mL Intracatheter Q7 Days     piperacillin-tazobactam  2.25 g Intravenous Q6H     vancomycin place koenig - receiving intermittent dosing  1 each Does not apply See Admin Instructions     levofloxacin  500 mg Intravenous Q48H     - MEDICATION INSTRUCTIONS for Dialysis Patients -   Does not apply See Admin Instructions     iopamidol  100 mL Intravenous Once     sodium chloride (PF)  3 mL Intracatheter Q8H     abacavir  600 mg Oral QPM     calcium acetate  2,001 mg Oral TID w/meals     chlorhexidine  15 mL Swish & Spit BID     clopidogrel (PLAVIX) tablet 75 mg  75 mg Oral QPM     darunavir  800 mg Oral At Bedtime     dolutegravir  50 mg Oral At Bedtime     mycophenolate  500 mg Oral BID     ritonavir  100 mg Oral At Bedtime       Data    Recent Labs  Lab 01/10/17  0345 01/09/17  0544 01/08/17  1415 01/08/17  0636  01/03/17  2112 01/03/17  1710   WBC 5.3 5.3  --  4.7  < >  --   --    HGB 6.4* 6.5* 6.9* 6.9*  < >  --   --    MCV 90 90  --  90  < >  --   --    * 112*  --  93*  < >  --   --    INR  --   --   --  1.41*  --   --   --     137  --  136  < >  --   --    POTASSIUM 4.4 4.1  --  4.5  < >  --   --    CHLORIDE 97 98  --  97  < >  --   --    CO2 26 27  --  27  < >  --   --    BUN 57* 48*  --  37*  < >  --   --    CR 8.87* 7.42*  --  5.64*  < >  --   --    ANIONGAP 13 12  --  12  < >  --   --    PRABHA 8.9 8.1*  --  7.9*  < >  --   --    * 107*  --  162*  < >  --   --    ALBUMIN  --  2.2*  --  2.3*  < >  --   --    PROTTOTAL  --  6.0*  --  6.0*  < >  --   --    BILITOTAL  --  0.7  --  0.8  < >  --   --    ALKPHOS  --  50  --  52  < >  --   --    ALT  --  264*  --  376*  < >  --   --    AST  --  224*  --  619*  < >  --   --    TROPI  --   --   --   --   --  <0.015The 99th  percentile for upper reference range is 0.045 ug/L.  Troponin values in the range of 0.045 - 0.120 ug/L may be associated with risks of adverse clinical events. <0.015The 99th percentile for upper reference range is 0.045 ug/L.  Troponin values in the range of 0.045 - 0.120 ug/L may be associated with risks of adverse clinical events.   < > = values in this interval not displayed.  Recent Results (from the past 24 hour(s))   XR Chest Port 1 View    Narrative    XR CHEST PORT 1 VW 1/9/2017 10:11 PM    HISTORY: hypoxia    COMPARISON: 1/9/2017 at 1230 hours.    FINDINGS: Increased left lower lobe opacity compatible with infiltrate  and possibly associated pleural effusion which is increased since  earlier film at 1230 hours. Right lung is grossly clear. ET tube with  tip in the midline within 1 cm of the jerry. Right PICC line with tip  at distal SVC. Enteric tube compatible with NG tube is seen extending  into the stomach, tip is difficult to visualize but possibly coiled in  the gastric fundus.         Impression    IMPRESSION:   Worsening left lower lobe infiltrate or atelectasis since 1230 hours.  ET tube is in the midline and just above jerry..    MD Georgie VÁSQUEZ

## 2017-01-10 NOTE — PROGRESS NOTES
Tele ICU    Called by bedside RN re hypoxia  Requiring increasing FiO2 up to 100% to maintain O2 sat over 90%  CXR after reintubation today nearly right mainstem. Unclear if the ET tube was adjusted afterwards  CXR ordered    Cy Diehl      CXR reviewed - ET tube above jerry, new LLL infiltrate  Albuterol and 3% NS nebs changed to q 4 hours  PEEP increased to 8  Will monitor   Cy Diehl  January 9, 2017  10:48 PM

## 2017-01-10 NOTE — PLAN OF CARE
Problem: Goal Outcome Summary  Goal: Goal Outcome Summary  Outcome: Declining  ICU End of Shift Summary.  For vital signs and complete assessments, please see documentation flowsheets.     Pertinent assessments: LS coarse, on vent. CXR. TF residuals.          Major Shift Events: Increasing O2 needs-CXR done, No residuals for TF.  LS coarse, thick yellow, creamy secretions.  Plan (Upcoming Events): Dialysis today with transfusion of 2 uPRBC's.   Discharge/Transfer Needs:     Bedside Shift Report Completed   Bedside Safety Check Completed

## 2017-01-10 NOTE — PROGRESS NOTES
Patient remains intubated and on ventilator. Patient required increased FiO2 overnight. CXR done, showing worsening left sided infiltrate. Order's to increase PEEP to +8, currently on 100% FiO2. SpO2 93-96%. BS coarse, suctioning moderate thick yellow secretions from ETT. Patient continues to receive Hypertonic/Albuterol nebulizers Q4H. RT will continue to follow.     Kristi Bingham, RT 1/10/2017, 5:57 AM

## 2017-01-10 NOTE — PROGRESS NOTES
ETT was repositioned and pulled back to 22 cm at the lips and resecured. Will continued to monitor and assess.    Pepe Whitfield RT  1/10/2017

## 2017-01-10 NOTE — PROGRESS NOTES
[facility]  Infectious Disease Progress Note          Assessment and Plan:   IMPRESSION:    1.  A 66-year-old male, very complex medical history, known to me from prior admission, currently is admitted with chest pain, rule out coronary artery disease, now has developed some increased cough and respiratory symptoms, although chest x-ray is negative.  New acute fever in the hospital as well, question respiratory infection, although not initially obvious, no leukocytosis, not really productive sputum and again chest x-ray negative.    2.  Chronic human immunodeficiency virus infection for over 30 years, treated at [facility], most recent T cells were 3 weeks ago at which time T cells were 263 and undetectable virus, i.e., unlikely to have an opportunistic infection, is on a fairly complicated 5-drug regimen, but well controlled.    3.  Known coronary disease, does not appear he has that as an explanation for current symptoms.    4.  Diabetes mellitus.    5.  End-stage renal disease on chronic dialysis on the transplant list.    6.  Sulfa allergy.    7.  Nephrolithiasis.   8 Resp failure, infiltrate presumed pneumonia, intubated in ICU       RECOMMENDATIONS:    1.  Continue his current antiretroviral therapy, as best able while intubated.    2.  Continue vanco/zosyn, no + cxs, fever down   3 Major hypoxia, should not get PCP/OI at current HIV numbers but check fungitel and LDH            Interval History:   Intubated and sedated in ICU,worsened hypoxia 90% FiO2. No + cxs sputum neg  CXR 1/9 same              Medications:       epoetin brittni (EPOGEN,PROCRIT) inj ESRD  5,000 Units Intravenous See Admin Instructions     [START ON 1/11/2017] pantoprazole  40 mg Per Feeding Tube Daily     heparin  5,000 Units Subcutaneous BID     B and C vitamin Complex with folic acid  5 mL Per Feeding Tube Daily     protein modular  1 packet Per Feeding Tube TID     aspirin chewable tablet 81 mg  81 mg Per  "Feeding Tube QPM     dapsone  100 mg Per Feeding Tube At Bedtime     gabapentin  300 mg Per Feeding Tube QAM     gabapentin  600 mg Per Feeding Tube QPM     magnesium oxide (MAG-OX) tablet 400 mg  400 mg Per Feeding Tube At Bedtime     sodium chloride  5 mL Nebulization Q4H     albuterol  2.5 mg Nebulization Q4H     insulin aspart  1-6 Units Subcutaneous Q4H     sodium chloride (PF)  10 mL Intracatheter Q7 Days     piperacillin-tazobactam  2.25 g Intravenous Q6H     vancomycin place koenig - receiving intermittent dosing  1 each Does not apply See Admin Instructions     levofloxacin  500 mg Intravenous Q48H     - MEDICATION INSTRUCTIONS for Dialysis Patients -   Does not apply See Admin Instructions     iopamidol  100 mL Intravenous Once     sodium chloride (PF)  3 mL Intracatheter Q8H     abacavir  600 mg Oral QPM     calcium acetate  2,001 mg Oral TID w/meals     chlorhexidine  15 mL Swish & Spit BID     clopidogrel (PLAVIX) tablet 75 mg  75 mg Oral QPM     darunavir  800 mg Oral At Bedtime     dolutegravir  50 mg Oral At Bedtime     mycophenolate  500 mg Oral BID     ritonavir  100 mg Oral At Bedtime                  Physical Exam:   Blood pressure 141/70, pulse 61, temperature 97.7  F (36.5  C), temperature source Axillary, resp. rate 12, height 1.803 m (5' 11\"), weight 87.8 kg (193 lb 9 oz), SpO2 96 %.  Wt Readings from Last 2 Encounters:   01/10/17 87.8 kg (193 lb 9 oz)   11/28/16 88.905 kg (196 lb)     Vital Signs with Ranges  Temp:  [97.6  F (36.4  C)-98.8  F (37.1  C)] 97.7  F (36.5  C)  Pulse:  [58-61] 61  Heart Rate:  [58-73] 58  Resp:  [11-36] 12  BP: (131-170)/(55-81) 141/70 mmHg  FiO2 (%):  [60 %-100 %] 90 %  SpO2:  [86 %-97 %] 96 %    Constitutional: Intubated/sedated   Lungs: Congestion to auscultation bilaterally, no crackles or wheezing   Cardiovascular: Regular rate and rhythm, normal S1 and S2, and no murmur noted   Abdomen: Normal bowel sounds, soft, non-distended, non-tender   Skin: No rashes, " no cyanosis, no edema   Other:           Data:   All microbiology laboratory data reviewed.  Recent Labs   Lab Test  01/10/17   0345  01/09/17   0544  01/08/17   1415  01/08/17   0636   WBC  5.3  5.3   --   4.7   HGB  6.4*  6.5*  6.9*  6.9*   HCT  20.5*  21.0*   --   21.6*   MCV  90  90   --   90   PLT  127*  112*   --   93*     Recent Labs   Lab Test  01/10/17   0345  01/09/17   0544  01/08/17   0636   CR  8.87*  7.42*  5.64*     No lab results found.  Recent Labs   Lab Test  01/07/17   1150  01/05/17 2012 01/05/17   2000  05/19/16   1730  03/05/16   2248  03/05/16   2247  03/05/16   2204  03/05/16   1115  07/06/14   2330   CULT  Culture negative monitoring continues  Incorrectly ordered by PCU/Clinic Canceled, Test credited ordered sputum culture   instead.    No growth after 5 days  No growth after 5 days  No growth  Test canceled by physician Duplicate request Charge credited  Test canceled by physician Duplicate request Charge credited  Test canceled by physician Duplicate request Charge credited  Test canceled by physician Duplicate request Charge credited  No growth  No growth  Charge credited Specimen not received Test canceled by PCU/Clinic  No growth

## 2017-01-10 NOTE — PROGRESS NOTES
POTASSIUM       Date     Value     Ref Range     Status       01/10/2017     4.4mmol/L   3.4 - 5.3 mmol/L     Final         HGB    6.4g/dl    1/10/2017  Weight: 87.8 kg  1/10/2017      Patient dialyzed for 3.5 hrs. on a 3 K bath with a net fluid removal of 3.7L.  A BFR of 450 ml/min was obtained via a left upper arm AV fistula using 15 gauge needles.         Total heparin received during the treatment: 0 units. Sites held x 12 min then drsgs applied.  Meds  Given: Epogen, 5000u. Blood products: 2 units PRBCs.. Complications: none.  Procedure and ESRD teaching done.  See flowsheet in EPIC for further details and post assessment.     Machine water alarm in place and functioning.  Consent for dialysis signed.  1/7/2017  Vascular Access: Aseptic prep done for both on/off.  HEPATITIS B SURFACE ANTIGEN: negative DATE: 12/24/16  HEPATITIS B SURFACE ANTIBODY: unknown  Chlorine/Chloramine water system checked every 4 hours.  Report given to: JERSEY Maloney RN  [facility]

## 2017-01-11 ENCOUNTER — APPOINTMENT (OUTPATIENT)
Dept: GENERAL RADIOLOGY | Facility: CLINIC | Age: 67
DRG: 286 | End: 2017-01-11
Attending: INTERNAL MEDICINE
Payer: MEDICARE

## 2017-01-11 ENCOUNTER — ANESTHESIA EVENT (OUTPATIENT)
Dept: INTENSIVE CARE | Facility: CLINIC | Age: 67
DRG: 286 | End: 2017-01-11
Payer: MEDICARE

## 2017-01-11 ENCOUNTER — ANESTHESIA (OUTPATIENT)
Dept: INTENSIVE CARE | Facility: CLINIC | Age: 67
DRG: 286 | End: 2017-01-11
Payer: MEDICARE

## 2017-01-11 LAB
1,3 BETA GLUCAN SER-MCNC: NORMAL NG/ML
ANION GAP SERPL CALCULATED.3IONS-SCNC: 12 MMOL/L (ref 3–14)
B-D GLUCAN INTERPRETATION (1,3): NORMAL
BACTERIA SPEC CULT: NO GROWTH
BACTERIA SPEC CULT: NO GROWTH
BASOPHILS # BLD AUTO: 0 10E9/L (ref 0–0.2)
BASOPHILS NFR BLD AUTO: 0.5 %
BUN SERPL-MCNC: 42 MG/DL (ref 7–30)
CALCIUM SERPL-MCNC: 9 MG/DL (ref 8.5–10.1)
CHLORIDE SERPL-SCNC: 96 MMOL/L (ref 94–109)
CK SERPL-CCNC: 52 U/L (ref 30–300)
CO2 SERPL-SCNC: 29 MMOL/L (ref 20–32)
CREAT SERPL-MCNC: 6.05 MG/DL (ref 0.66–1.25)
DIFFERENTIAL METHOD BLD: ABNORMAL
EOSINOPHIL # BLD AUTO: 0.3 10E9/L (ref 0–0.7)
EOSINOPHIL NFR BLD AUTO: 2.9 %
ERYTHROCYTE [DISTWIDTH] IN BLOOD BY AUTOMATED COUNT: 14.8 % (ref 10–15)
GFR SERPL CREATININE-BSD FRML MDRD: 9 ML/MIN/1.7M2
GLUCOSE BLDC GLUCOMTR-MCNC: 196 MG/DL (ref 70–99)
GLUCOSE BLDC GLUCOMTR-MCNC: 198 MG/DL (ref 70–99)
GLUCOSE BLDC GLUCOMTR-MCNC: 199 MG/DL (ref 70–99)
GLUCOSE BLDC GLUCOMTR-MCNC: 205 MG/DL (ref 70–99)
GLUCOSE BLDC GLUCOMTR-MCNC: 211 MG/DL (ref 70–99)
GLUCOSE BLDC GLUCOMTR-MCNC: 223 MG/DL (ref 70–99)
GLUCOSE SERPL-MCNC: 211 MG/DL (ref 70–99)
HCT VFR BLD AUTO: 25.8 % (ref 40–53)
HGB BLD-MCNC: 8.3 G/DL (ref 13.3–17.7)
IMM GRANULOCYTES # BLD: 0.3 10E9/L (ref 0–0.4)
IMM GRANULOCYTES NFR BLD: 4 %
LYMPHOCYTES # BLD AUTO: 1 10E9/L (ref 0.8–5.3)
LYMPHOCYTES NFR BLD AUTO: 11.2 %
Lab: NORMAL
Lab: NORMAL
MCH RBC QN AUTO: 28.9 PG (ref 26.5–33)
MCHC RBC AUTO-ENTMCNC: 32.2 G/DL (ref 31.5–36.5)
MCV RBC AUTO: 90 FL (ref 78–100)
MICRO REPORT STATUS: NORMAL
MICRO REPORT STATUS: NORMAL
MONOCYTES # BLD AUTO: 0.7 10E9/L (ref 0–1.3)
MONOCYTES NFR BLD AUTO: 7.8 %
NEUTROPHILS # BLD AUTO: 6.3 10E9/L (ref 1.6–8.3)
NEUTROPHILS NFR BLD AUTO: 73.6 %
NRBC # BLD AUTO: 0 10*3/UL
NRBC BLD AUTO-RTO: 0 /100
PLATELET # BLD AUTO: 138 10E9/L (ref 150–450)
POTASSIUM SERPL-SCNC: 4 MMOL/L (ref 3.4–5.3)
RBC # BLD AUTO: 2.87 10E12/L (ref 4.4–5.9)
SODIUM SERPL-SCNC: 137 MMOL/L (ref 133–144)
SPECIMEN SOURCE: NORMAL
SPECIMEN SOURCE: NORMAL
TRIGL SERPL-MCNC: 473 MG/DL
VANCOMYCIN SERPL-MCNC: 21.8 MG/L
WBC # BLD AUTO: 8.6 10E9/L (ref 4–11)

## 2017-01-11 PROCEDURE — A9270 NON-COVERED ITEM OR SERVICE: HCPCS | Mod: GY | Performed by: INTERNAL MEDICINE

## 2017-01-11 PROCEDURE — 25000125 ZZHC RX 250: Performed by: NURSE ANESTHETIST, CERTIFIED REGISTERED

## 2017-01-11 PROCEDURE — 25000132 ZZH RX MED GY IP 250 OP 250 PS 637: Mod: GY | Performed by: INTERNAL MEDICINE

## 2017-01-11 PROCEDURE — 25000128 H RX IP 250 OP 636: Performed by: INTERNAL MEDICINE

## 2017-01-11 PROCEDURE — 25000308 HC RX OP HPI UCR WEL MED 250 IP 250: Performed by: SURGERY

## 2017-01-11 PROCEDURE — 25000125 ZZHC RX 250: Performed by: INTERNAL MEDICINE

## 2017-01-11 PROCEDURE — 40000671 ZZH STATISTIC ANESTHESIA CASE

## 2017-01-11 PROCEDURE — 31500 INSERT EMERGENCY AIRWAY: CPT

## 2017-01-11 PROCEDURE — 80048 BASIC METABOLIC PNL TOTAL CA: CPT | Performed by: INTERNAL MEDICINE

## 2017-01-11 PROCEDURE — 94640 AIRWAY INHALATION TREATMENT: CPT | Mod: 76

## 2017-01-11 PROCEDURE — 84478 ASSAY OF TRIGLYCERIDES: CPT | Performed by: INTERNAL MEDICINE

## 2017-01-11 PROCEDURE — 20000003 ZZH R&B ICU

## 2017-01-11 PROCEDURE — 27210432 ZZH NUTRITION PRODUCT RENAL BASIC LITER

## 2017-01-11 PROCEDURE — 99233 SBSQ HOSP IP/OBS HIGH 50: CPT | Performed by: INTERNAL MEDICINE

## 2017-01-11 PROCEDURE — 40000275 ZZH STATISTIC RCP TIME EA 10 MIN

## 2017-01-11 PROCEDURE — 99291 CRITICAL CARE FIRST HOUR: CPT | Performed by: INTERNAL MEDICINE

## 2017-01-11 PROCEDURE — 40000940 XR CHEST PORT 1 VW

## 2017-01-11 PROCEDURE — 82550 ASSAY OF CK (CPK): CPT | Performed by: INTERNAL MEDICINE

## 2017-01-11 PROCEDURE — 00000146 ZZHCL STATISTIC GLUCOSE BY METER IP

## 2017-01-11 PROCEDURE — 80202 ASSAY OF VANCOMYCIN: CPT | Performed by: INTERNAL MEDICINE

## 2017-01-11 PROCEDURE — 85025 COMPLETE CBC W/AUTO DIFF WBC: CPT | Performed by: INTERNAL MEDICINE

## 2017-01-11 PROCEDURE — 94003 VENT MGMT INPAT SUBQ DAY: CPT

## 2017-01-11 PROCEDURE — 94640 AIRWAY INHALATION TREATMENT: CPT

## 2017-01-11 PROCEDURE — 25000132 ZZH RX MED GY IP 250 OP 250 PS 637: Mod: GY | Performed by: SURGERY

## 2017-01-11 RX ORDER — PROPOFOL 10 MG/ML
10-20 INJECTION, EMULSION INTRAVENOUS EVERY 30 MIN PRN
Status: DISCONTINUED | OUTPATIENT
Start: 2017-01-11 | End: 2017-01-13

## 2017-01-11 RX ORDER — PROPOFOL 10 MG/ML
5-75 INJECTION, EMULSION INTRAVENOUS CONTINUOUS
Status: DISCONTINUED | OUTPATIENT
Start: 2017-01-11 | End: 2017-01-13

## 2017-01-11 RX ORDER — PROPOFOL 10 MG/ML
INJECTION, EMULSION INTRAVENOUS PRN
Status: DISCONTINUED | OUTPATIENT
Start: 2017-01-11 | End: 2017-01-11

## 2017-01-11 RX ADMIN — PROPOFOL 45 MCG/KG/MIN: 10 INJECTION, EMULSION INTRAVENOUS at 18:26

## 2017-01-11 RX ADMIN — HEPARIN SODIUM 5000 UNITS: 5000 INJECTION, SOLUTION INTRAVENOUS; SUBCUTANEOUS at 20:10

## 2017-01-11 RX ADMIN — CLOPIDOGREL 75 MG: 75 TABLET, FILM COATED ORAL at 20:11

## 2017-01-11 RX ADMIN — CALCIUM ACETATE 2001 MG: 667 CAPSULE ORAL at 12:29

## 2017-01-11 RX ADMIN — CHLORHEXIDINE GLUCONATE 15 ML: 1.2 RINSE ORAL at 20:16

## 2017-01-11 RX ADMIN — Medication 5 ML: at 08:46

## 2017-01-11 RX ADMIN — PROPOFOL 200 MG: 10 INJECTION, EMULSION INTRAVENOUS at 01:49

## 2017-01-11 RX ADMIN — ALBUTEROL SULFATE 2.5 MG: 2.5 SOLUTION RESPIRATORY (INHALATION) at 23:40

## 2017-01-11 RX ADMIN — HYDROMORPHONE HYDROCHLORIDE 0.5 MG: 10 INJECTION, SOLUTION INTRAMUSCULAR; INTRAVENOUS; SUBCUTANEOUS at 15:12

## 2017-01-11 RX ADMIN — RITONAVIR 100 MG: 100 TABLET, FILM COATED ORAL at 20:41

## 2017-01-11 RX ADMIN — CALCIUM ACETATE 2001 MG: 667 CAPSULE ORAL at 08:47

## 2017-01-11 RX ADMIN — SODIUM CHLORIDE SOLN NEBU 3% 5 ML: 3 NEBU SOLN at 19:23

## 2017-01-11 RX ADMIN — SODIUM CHLORIDE SOLN NEBU 3% 5 ML: 3 NEBU SOLN at 16:01

## 2017-01-11 RX ADMIN — ALBUTEROL SULFATE 2.5 MG: 2.5 SOLUTION RESPIRATORY (INHALATION) at 11:14

## 2017-01-11 RX ADMIN — ALBUTEROL SULFATE 2.5 MG: 2.5 SOLUTION RESPIRATORY (INHALATION) at 08:07

## 2017-01-11 RX ADMIN — GABAPENTIN 300 MG: 250 SUSPENSION ORAL at 08:47

## 2017-01-11 RX ADMIN — MYCOPHENOLATE MOFETIL 500 MG: 250 CAPSULE ORAL at 08:47

## 2017-01-11 RX ADMIN — ALBUTEROL SULFATE 2.5 MG: 2.5 SOLUTION RESPIRATORY (INHALATION) at 19:23

## 2017-01-11 RX ADMIN — TAZOBACTAM SODIUM AND PIPERACILLIN SODIUM 2.25 G: 250; 2 INJECTION, SOLUTION INTRAVENOUS at 18:25

## 2017-01-11 RX ADMIN — SODIUM CHLORIDE SOLN NEBU 3% 4 ML: 3 NEBU SOLN at 11:14

## 2017-01-11 RX ADMIN — VANCOMYCIN HYDROCHLORIDE 1750 MG: 1 INJECTION, POWDER, LYOPHILIZED, FOR SOLUTION INTRAVENOUS at 20:09

## 2017-01-11 RX ADMIN — HEPARIN SODIUM 5000 UNITS: 5000 INJECTION, SOLUTION INTRAVENOUS; SUBCUTANEOUS at 09:10

## 2017-01-11 RX ADMIN — SODIUM CHLORIDE SOLN NEBU 3% 5 ML: 3 NEBU SOLN at 23:41

## 2017-01-11 RX ADMIN — Medication 1 PACKET: at 16:44

## 2017-01-11 RX ADMIN — Medication 40 MG: at 09:12

## 2017-01-11 RX ADMIN — TAZOBACTAM SODIUM AND PIPERACILLIN SODIUM 2.25 G: 250; 2 INJECTION, SOLUTION INTRAVENOUS at 00:04

## 2017-01-11 RX ADMIN — INSULIN GLARGINE 15 UNITS: 100 INJECTION, SOLUTION SUBCUTANEOUS at 11:45

## 2017-01-11 RX ADMIN — DARUNAVIR 800 MG: 800 TABLET, FILM COATED ORAL at 20:11

## 2017-01-11 RX ADMIN — TAZOBACTAM SODIUM AND PIPERACILLIN SODIUM 2.25 G: 250; 2 INJECTION, SOLUTION INTRAVENOUS at 05:18

## 2017-01-11 RX ADMIN — TAZOBACTAM SODIUM AND PIPERACILLIN SODIUM 2.25 G: 250; 2 INJECTION, SOLUTION INTRAVENOUS at 23:49

## 2017-01-11 RX ADMIN — ALBUTEROL SULFATE 2.5 MG: 2.5 SOLUTION RESPIRATORY (INHALATION) at 16:01

## 2017-01-11 RX ADMIN — TAZOBACTAM SODIUM AND PIPERACILLIN SODIUM 2.25 G: 250; 2 INJECTION, SOLUTION INTRAVENOUS at 12:29

## 2017-01-11 RX ADMIN — PROPOFOL 40 MCG/KG/MIN: 10 INJECTION, EMULSION INTRAVENOUS at 14:28

## 2017-01-11 RX ADMIN — Medication 1 PACKET: at 20:39

## 2017-01-11 RX ADMIN — CALCIUM ACETATE 2001 MG: 667 CAPSULE ORAL at 18:22

## 2017-01-11 RX ADMIN — PROPOFOL 55 MCG/KG/MIN: 10 INJECTION, EMULSION INTRAVENOUS at 22:23

## 2017-01-11 RX ADMIN — GABAPENTIN 600 MG: 250 SUSPENSION ORAL at 20:39

## 2017-01-11 RX ADMIN — MAGNESIUM OXIDE TAB 400 MG (241.3 MG ELEMENTAL MG) 400 MG: 400 (241.3 MG) TAB at 20:40

## 2017-01-11 RX ADMIN — HYDROMORPHONE HYDROCHLORIDE 0.5 MG: 10 INJECTION, SOLUTION INTRAMUSCULAR; INTRAVENOUS; SUBCUTANEOUS at 18:24

## 2017-01-11 RX ADMIN — SUCCINYLCHOLINE CHLORIDE 140 MG: 20 INJECTION, SOLUTION INTRAMUSCULAR; INTRAVENOUS at 01:50

## 2017-01-11 RX ADMIN — PROPOFOL 35 MCG/KG/MIN: 10 INJECTION, EMULSION INTRAVENOUS at 09:19

## 2017-01-11 RX ADMIN — SODIUM CHLORIDE SOLN NEBU 3% 5 ML: 3 NEBU SOLN at 08:07

## 2017-01-11 RX ADMIN — ALBUTEROL SULFATE 2.5 MG: 2.5 SOLUTION RESPIRATORY (INHALATION) at 03:29

## 2017-01-11 RX ADMIN — PROPOFOL 55 MCG/KG/MIN: 10 INJECTION, EMULSION INTRAVENOUS at 05:17

## 2017-01-11 RX ADMIN — SODIUM CHLORIDE SOLN NEBU 3% 5 ML: 3 NEBU SOLN at 03:29

## 2017-01-11 RX ADMIN — MYCOPHENOLATE MOFETIL 500 MG: 250 CAPSULE ORAL at 20:10

## 2017-01-11 RX ADMIN — ASPIRIN 81 MG 81 MG: 81 TABLET ORAL at 20:10

## 2017-01-11 RX ADMIN — ABACAVIR 600 MG: 300 TABLET ORAL at 20:15

## 2017-01-11 RX ADMIN — MYCOPHENOLATE MOFETIL 500 MG: 250 CAPSULE ORAL at 20:41

## 2017-01-11 RX ADMIN — PROPOFOL 55 MCG/KG/MIN: 10 INJECTION, EMULSION INTRAVENOUS at 02:18

## 2017-01-11 RX ADMIN — Medication 1 PACKET: at 08:46

## 2017-01-11 RX ADMIN — DAPSONE 100 MG: 25 TABLET ORAL at 20:39

## 2017-01-11 RX ADMIN — CHLORHEXIDINE GLUCONATE 15 ML: 1.2 RINSE ORAL at 09:13

## 2017-01-11 NOTE — PROGRESS NOTES
[facility] Intensive Care Progress Note  Problem list  End-stage renal disease  HIV  Respiratory failure due to pneumonia  Type 2 diabetes  Chronic anemia    Date of Service (when I saw the patient): 01/11/2017     Assessment and Plan  Donald Raza is a 66 year old male with complex medical hx of HAART for hx of HIV, ESRD on HD, CAD, Hypertension, DM type 2 on insulin, chronic anemia  admitted on 1/3/2017 with episodes of chest pain and was determined non cardiac in etiology given recent heart cath findings.  Failed extubation on 1/9, self extubated on 1/10 and was reintubated      Neurology:  Keep more deeply sedated due to self extubation  Plan: Propofol for RASS goal 3-4    Cardiovascular:  Septic shock requiring vasopressor. H/o CAD and multiple PCI. ECG unremarkable. He was started on echo showed LVH and left atrial dilatation  Plan:    Maintain normotension and euvolemia    Pulmonary:        Hypoxemic respiratory failure requiring intubation on 1/7 most likely sec to PNA: febrile, bilateral LL infiltrates.  Failed self extubation overnight, due to secretions it sounds like  Plan: Continue broad spectrum ABX. Until all cultures available.   Nebulized saline solution to help clear secretions    Ventilation Mode: CMV/AC  FiO2 (%): 60 %  Rate Set (breaths/minute): 12 breaths/min  Tidal Volume Set (mL): 500 mL  PEEP (cm H2O): 8 cmH2O  Oxygen Concentration (%): 60 %  Resp: 14    Renal  Hyperkalemia on admission resolved. H/o ESRD on HD, T/R/S   Plan: Monitor.     ID:         Sepsis, probable pneumonia: HIV on antiretroviral therapy. Temp improved, cultures NGTD; Agree with ID rationale, PJP unlikely. Bronchoscopy can be performed but would probably not be helpful for bacterial etiology given time of antibiotics  Plan: Continue broad spectrum antibiotics until all cultures available.     GI  No concerns.    Nutrition  Malnutrition.  Plan: nutrition consult for  TF    Endocrine:  H/o DM type II.   Plan: Monitor and treat hyperglycemia.      Heme/Onc:  Acute on chronic anemia. Thrombocytopenia. No clear source of bleeding.2 unit PRBC for hgb 5,4 and then 61. on 1/7/17 .   Plan: Hgb 8.3 today, transfused overnight   Reduce blood draws except for before dialysis    DVT Prophylaxis: Pneumatic Compression Devices  GI Prophylaxis: PPI    Restraints: Restraints for medical healing needed: YES    Family update by me today: no.    Georgie Crump    Time Spent on This Encounter  Billing:  I spent 35 minutes bedside and on the inpatient unit today managing the critical care of Donald Raza in relation to the issues listed in this note.    Main Plans for Today  Continue hypertonic saline and suctioning    Physical Exam  Temp: 97.9  F (36.6  C) Temp src: Axillary Temp  Min: 97.7  F (36.5  C)  Max: 100  F (37.8  C) BP: 149/71 mmHg Pulse: 61 Heart Rate: 61 Resp: 14 SpO2: 95 % O2 Device: Mechanical Ventilator    Filed Vitals:    01/10/17 0000 01/10/17 0730 01/11/17 0400   Weight: 87.8 kg (193 lb 9 oz) 87.8 kg (193 lb 9 oz) 84.6 kg (186 lb 8.2 oz)     I/O last 3 completed shifts:  In: 2892.65 [I.V.:982.65; NG/GT:480]  Out: 3700 [Other:3700]    Neurologic: Sedated, grossly non-focal.   Cardiovascular: RRR, no murmurs and gallops, pulses present all 4 extremities.    Respiratory: breath sounds course bl, decrease bl LL.  GI: soft, non-tender, non-distended.    Skin/Extremities: pale, no deformities or edema.    Lines: No erythema or discharge at entry site for PICC Line  Current lines are required for patient management    Medications    IV fluid REPLACEMENT ONLY       propofol (DIPRIVAN) infusion Stopped (01/11/17 0837)     NaCl 10 mL/hr at 01/11/17 0845       pantoprazole  40 mg Per Feeding Tube Daily     heparin  5,000 Units Subcutaneous BID     B and C vitamin Complex with folic acid  5 mL Per Feeding Tube Daily     protein modular  1 packet Per Feeding Tube TID     aspirin chewable  tablet 81 mg  81 mg Per Feeding Tube QPM     dapsone  100 mg Per Feeding Tube At Bedtime     gabapentin  300 mg Per Feeding Tube QAM     gabapentin  600 mg Per Feeding Tube QPM     magnesium oxide (MAG-OX) tablet 400 mg  400 mg Per Feeding Tube At Bedtime     sodium chloride  5 mL Nebulization Q4H     albuterol  2.5 mg Nebulization Q4H     insulin aspart  1-6 Units Subcutaneous Q4H     sodium chloride (PF)  10 mL Intracatheter Q7 Days     piperacillin-tazobactam  2.25 g Intravenous Q6H     vancomycin place koenig - receiving intermittent dosing  1 each Does not apply See Admin Instructions     levofloxacin  500 mg Intravenous Q48H     - MEDICATION INSTRUCTIONS for Dialysis Patients -   Does not apply See Admin Instructions     iopamidol  100 mL Intravenous Once     sodium chloride (PF)  3 mL Intracatheter Q8H     abacavir  600 mg Oral QPM     calcium acetate  2,001 mg Oral TID w/meals     chlorhexidine  15 mL Swish & Spit BID     clopidogrel (PLAVIX) tablet 75 mg  75 mg Oral QPM     darunavir  800 mg Oral At Bedtime     dolutegravir  50 mg Oral At Bedtime     mycophenolate  500 mg Oral BID     ritonavir  100 mg Oral At Bedtime       Data    Recent Labs  Lab 01/11/17  0446 01/10/17  0345 01/09/17  0544  01/08/17  0636   WBC 8.6 5.3 5.3  --  4.7   HGB 8.3* 6.4* 6.5*  < > 6.9*   MCV 90 90 90  --  90   * 127* 112*  --  93*   INR  --   --   --   --  1.41*    136 137  --  136   POTASSIUM 4.0 4.4 4.1  --  4.5   CHLORIDE 96 97 98  --  97   CO2 29 26 27  --  27   BUN 42* 57* 48*  --  37*   CR 6.05* 8.87* 7.42*  --  5.64*   ANIONGAP 12 13 12  --  12   PRABHA 9.0 8.9 8.1*  --  7.9*   * 104* 107*  --  162*   ALBUMIN  --   --  2.2*  --  2.3*   PROTTOTAL  --   --  6.0*  --  6.0*   BILITOTAL  --   --  0.7  --  0.8   ALKPHOS  --   --  50  --  52   ALT  --   --  264*  --  376*   AST  --   --  224*  --  619*   < > = values in this interval not displayed.  Recent Results (from the past 24 hour(s))   XR Chest Port 1  View    Narrative    CHEST ONE VIEW PORTABLE   1/10/2017 11:37 AM     HISTORY: Endotracheal tube placement.    COMPARISON: None.      Impression    IMPRESSION: The tip of the endotracheal tube is at the orifice of the  right mainstem bronchus, advanced about 2 cm since yesterday's exam.  This should be pulled back about 3 cm for optimal positioning. New  right lower lobe infiltrate may represent atelectasis. Left lower lobe  infiltrate is unchanged. Shallow inspiration. Borderline cardiomegaly.    MAI CAMEJO MD   XR Chest Port 1 View    Narrative    CHEST SINGLE VIEW PORTABLE   1/11/2017 2:01 AM     HISTORY: Endotracheal tube placement.    COMPARISON: 1/10/2017 at 1130 hours.    FINDINGS: Interval pullback of an endotracheal tube with distal tube  tip now in the trachea and approximately 1.6 cm proximal to the  jerry. An enteric drainage tube is again present with distal tube tip  in the gastric fundus. A right upper extremity peripherally inserted  central catheter is present with distal catheter tip in the superior  vena cava. Hypoinflated lungs. The lungs are otherwise clear. Possible  cardiomegaly. A vascular stent is present in the left axillary region.      Impression    IMPRESSION:   1. Interval pullback of an endotracheal tube with distal tube tip now  in the trachea and approximately 1.6 cm proximal to the jerry.  2. No other significant interval change.    MD Georgie CUEVAS

## 2017-01-11 NOTE — PLAN OF CARE
Problem: Restraint for Non-Violent/Non-Self-Destructive Behavior  Goal: Prevent/Manage Potential Problems  Maintain safety of patient and others during period of restraint.  Promote psychological and physical wellbeing.  Prevent injury to skin and involved body parts.  Promote nutrition, hydration, and elimination.   Outcome: No Change  soft restraints restraints continued 1/11/2017    Clinical Justification: Pulling lines, pulling tubes, and pulling equipment  Less Restrictive Alternative: 1:1 patient care, Repositioning, Re-evaluate equipment, Disguise equipment, Pain management, Alarm, De-escalation, Reorientation  Attending Physician Notified: MD ordered restraint,     New orders placed Yes  Length of Order: 1 Day      Medina Skinner

## 2017-01-11 NOTE — PLAN OF CARE
Problem: Restraint for Non-Violent/Non-Self-Destructive Behavior  Goal: Prevent/Manage Potential Problems  Maintain safety of patient and others during period of restraint.  Promote psychological and physical wellbeing.  Prevent injury to skin and involved body parts.  Promote nutrition, hydration, and elimination.   Outcome: No Change  Right wrist and Left wrist restraints continued 1/10/2017    Clinical Justification: Pulling lines, pulling tubes, and pulling equipment  Less Restrictive Alternative: 1:1 patient care, Repositioning  Attending Physician Notified: MD ordered restraint,     New orders placed Yes  Length of Order: 1 Day      Karen Bhatt

## 2017-01-11 NOTE — ANESTHESIA CARE TRANSFER NOTE
Patient: Donald C Luz Marina    INTUBATION  Additional Information* No procedures listed *    Diagnosis: * No pre-op diagnosis entered *  Diagnosis Additional Information: No value filed.    Anesthesia Type:   No value filed.     Note:  Airway :ETT  Patient transferred to:ICU  Comments: VSS.  Pt on ventilator.  Report to RN.      Vitals: (Last set prior to Anesthesia Care Transfer)              Electronically Signed By: DEDRICK Laguerre CRNA  January 11, 2017  2:13 AM

## 2017-01-11 NOTE — PLAN OF CARE
Problem: Ventilation, Mechanical Invasive (Adult)  Goal: Signs and Symptoms of Listed Potential Problems Will be Absent or Manageable (Ventilation, Mechanical Invasive)  Signs and symptoms of listed potential problems will be absent or manageable by discharge/transition of care (reference Ventilation, Mechanical Invasive (Adult) CPG).   Outcome: No Change  Neuro: opens eyes to speech. Moves all ext. RASS -2 on Prop 35  Cards: SB/SR. BP stable  Resp: thick, creamy secretions in copious amts today. Weaned to FI02 of 60%  GI: loose, soft bms today. Pad in place  : no urine. Dialysis today  Pain: dilaudid for abd pain  Skin: intact  Lines: PICC  Drips: Prop  Labs: 2 units of blood during dialysis today. hgb 6.4  Plan: assess dialysis need in am. Monitor labs. Cont to try and wean vent etc.

## 2017-01-11 NOTE — PROGRESS NOTES
Renal Medicine           Events of last pm noted  Remains vented and sedated      Plan am dialysis   UF as tolerated  Dialysis orders entered        Recent Labs  Lab 01/11/17  0446      POTASSIUM 4.0   CHLORIDE 96   CO2 29   ANIONGAP 12   *   BUN 42*   CR 6.05*   GFRESTIMATED 9*   GFRESTBLACK 11*   PRABHA 9.0           JAMAL Fuller    [facility]  [phone]

## 2017-01-11 NOTE — PROGRESS NOTES
[facility]  Hospitalist Progress Note  Nelson Worthy MD 01/11/2017    Reason for Stay (Diagnosis): resp failure         Assessment and Plan:      Summary of Stay: Donald Raza is a 66 year old male with complex medical hx of HAART for hx of HIV, ESRD on HD, CAD, Hypertension, DM type 2 on insulin, chronic anemia  admitted on 1/3/2017 with episodes of chest pain and was determined non cardiac in etiology given heart cath findings this admit. Hospital course then complicated by intermittent fever spikes ( similar to previous hospitalizations) and increasing O2 requirements that eventually led to severe respiratory distress, unresponsiveness and a CODE blue on 1/7 resulting in intubation/ventilation.  Course complicated by failed attempt at extubation on 1/9 with re-intubation 3 hours later.  he then self extubated on 1/10 again requiring re-intubation    Problem List:    1. Acute respiratory failure secondary likely to bilateral pneumonia - possible HCAP as developed while hospitalized. Remains on vanco, zosyn, levaquin.  Attempted extubation on 1/9; extubated for several hours but was quickly reintubated again as pt developed copious secretions he could not clear.  cx without growth.  Doubt opportunistic infection as CD4 count adequate and no detectable viral load  2. Fever spikes suspect from #1  3. Worsening anemia on top of chronic anemia.  transfuse 2 units during HD 1/10 with good response.  Likely AOCD; no e/o acute blood loss  4. ESRD on HD - dialyzes tues/thurs/sat  5. HIV on HAART.  CD4 count 260 and non-detectable viral load per ID note from 3 weeks PTA; therefore opportunistic infection unlikely  6. DM type 2 insulin requiring - on ISS prn.  Will add Lantus 15 units daily  7. CAD with coronary angiogram this admission showing no sig CAD; no intervention done.  TTE this admit shows normal LV ftn  8. Hypertension hx  9. Septic shock requiring vasopressors with earlier fever, findings  "of HCAP, tachypnea, alteration in mental state, now off pressors  10. Thrombocytopenia likely sepsis related  11. Abnormal LFT's- suspect from shock liver given earlier hypotension and bradycardia.   Improved.  Hold statin  12. Nutrition:  Nutrition consulted for TF        DVT Prophylaxis: heparin sq  Code Status: Full Code  Discharge Dispo: to be determined        Interval History (Subjective):      Sedated on vent.  Will follow commands                  Physical Exam:      Last Vital Signs:  /68 mmHg  Pulse 61  Temp(Src) 97.9  F (36.6  C) (Axillary)  Resp 14  Ht 1.803 m (5' 11\")  Wt 84.6 kg (186 lb 8.2 oz)  BMI 26.02 kg/m2  SpO2 96%      Intake/Output Summary (Last 24 hours) at 01/11/17 1529  Last data filed at 01/11/17 1400   Gross per 24 hour   Intake 2020.65 ml   Output      0 ml   Net 2020.65 ml       Constitutional: Awake on vent, cooperative, no apparent distress   Respiratory: Clear to auscultation bilaterally, no crackles or wheezing   Cardiovascular: Regular rate and rhythm, normal S1 and S2, and no murmur noted   Abdomen: Normal bowel sounds, soft, non-distended, non-tender   Skin: No rashes, no cyanosis, dry to touch   Neuro: Awake on vent and moving extremities, no weakness,   Extremities: No edema, normal range of motion   Other(s):        All other systems: Negative          Medications:      All current medications were reviewed with changes reflected in problem list.         Data:      All new lab and imaging data was reviewed.   Labs:    Recent Labs  Lab 01/11/17  0446 01/10/17  0345 01/09/17  0544   WBC 8.6 5.3 5.3   HGB 8.3* 6.4* 6.5*   HCT 25.8* 20.5* 21.0*   MCV 90 90 90   * 127* 112*      Imaging:   Recent Results (from the past 24 hour(s))   XR Chest Port 1 View    Narrative    CHEST SINGLE VIEW PORTABLE   1/11/2017 2:01 AM     HISTORY: Endotracheal tube placement.    COMPARISON: 1/10/2017 at 1130 hours.    FINDINGS: Interval pullback of an endotracheal tube with distal " tube  tip now in the trachea and approximately 1.6 cm proximal to the  jerry. An enteric drainage tube is again present with distal tube tip  in the gastric fundus. A right upper extremity peripherally inserted  central catheter is present with distal catheter tip in the superior  vena cava. Hypoinflated lungs. The lungs are otherwise clear. Possible  cardiomegaly. A vascular stent is present in the left axillary region.      Impression    IMPRESSION:   1. Interval pullback of an endotracheal tube with distal tube tip now  in the trachea and approximately 1.6 cm proximal to the jerry.  2. No other significant interval change.    EN KINSEY MD

## 2017-01-11 NOTE — PROGRESS NOTES
Tele ICU    Notified re patient self-extubation  Required reintubation  CXR reviewed, ET tube at jerry, slightly right mainstem  Bedside nurse notified to withdraw tube 2 cm    Cy Diehl  January 11, 2017  2:07 AM

## 2017-01-11 NOTE — PROGRESS NOTES
Received call from RT at 0110 stating that patient had self extubated. Due to high FiO2 and copious amounts of secretions anesthesia was called and patient was re-intubated. Anesthesia intubated with 8.0 ETT 26@lip. Correct tube placement was confirmed with positive ETCO2 and CXR. Per TELE-ICU, instructed to pull back ETT 2 cm to 24@lip. Patient placed back on previous ventilator settings CMV 12/500/+8/60%.

## 2017-01-11 NOTE — PHARMACY-VANCOMYCIN DOSING SERVICE
Pharmacy Vancomycin Note  Date of Service 2017  Patient's  1950   66 year old, male    Indication: Hypoxemic respiratory failure requiring intubation on  most likely sec to PNA.  Goal Trough Level: 15-20 mg/L  Day of Therapy: 6  Current Vancomycin regimen: intermittent    Current estimated CrCl = Estimated Creatinine Clearance: 14 mL/min (based on Cr of 6.05).    Creatinine for last 3 days  2017:  5:44 AM Creatinine 7.42 mg/dL*  1/10/2017:  3:45 AM Creatinine 8.87 mg/dL*  2017:  4:46 AM Creatinine 6.05 mg/dL*    Recent Vancomycin Levels (past 3 days)  1/10/2017:  7:30 AM Vancomycin Level 27.9 mg/L*  2017:  4:46 AM Vancomycin Level 21.8 mg/L    Vancomycin IV Administrations (past 72 hours)      No vancomycin orders with administrations in past 72 hours.                Nephrotoxins and other renal medications (Future)    Start     Dose/Rate Route Frequency Ordered Stop    17  vancomycin (VANCOCIN) 1,750 mg in NaCl 0.9 % 250 mL intermittent infusion      1,750 mg (central catheter) Intravenous ONCE 17 1315      17 2215  piperacillin-tazobactam (ZOSYN) infusion 2.25 g     Comments:  Pharmacy can adjust dose based on renal function.    2.25 g  100 mL/hr over 30 Minutes Intravenous EVERY 6 HOURS 17 2206      17 2212  vancomycin place koenig - receiving intermittent dosing      1 each Does not apply SEE ADMIN INSTRUCTIONS 17 2212               Contrast Orders - past 72 hours     None          Interpretation of levels and current regimen:  Trough level is slightly  Supratherapeutic  Renal Function: ESRD on Dialysis    Plan:  1.  One time dose of 1750mg tonight at 20:00.  2.  Pharmacy will check trough levels as appropriate in 3-5 Days.    3.  Pharmacy will continue to monitor closely.     Adriana Tolbert        .

## 2017-01-11 NOTE — PLAN OF CARE
Problem: Restraint for Non-Violent/Non-Self-Destructive Behavior  Goal: Prevent/Manage Potential Problems  Maintain safety of patient and others during period of restraint.  Promote psychological and physical wellbeing.  Prevent injury to skin and involved body parts.  Promote nutrition, hydration, and elimination.   Outcome: No Change  Restraints are still needed for pt safety.

## 2017-01-11 NOTE — PROGRESS NOTES
[facility]  Infectious Disease Progress Note          Assessment and Plan:   IMPRESSION:    1.  A 66-year-old male, very complex medical history, known to me from prior admission, currently is admitted with chest pain, rule out coronary artery disease, now has developed some increased cough and respiratory symptoms, although chest x-ray is negative.  New acute fever in the hospital as well, question respiratory infection, although not initially obvious, no leukocytosis, not really productive sputum and again chest x-ray negative.    2.  Chronic human immunodeficiency virus infection for over 30 years, treated at [facility], most recent T cells were 3 weeks ago at which time T cells were 263 and undetectable virus, i.e., unlikely to have an opportunistic infection, is on a fairly complicated 5-drug regimen, but well controlled.    3.  Known coronary disease, does not appear he has that as an explanation for current symptoms.    4.  Diabetes mellitus.    5.  End-stage renal disease on chronic dialysis on the transplant list.    6.  Sulfa allergy.    7.  Nephrolithiasis.   8 Resp failure, infiltrate presumed pneumonia, intubated in ICU       RECOMMENDATIONS:    1.  Continue his current antiretroviral therapy, as best able while intubated.    2.  Continue vanco/zosyn, no + cxs, fever down   3 Improving hypoxia, should not get PCP/OI at current HIV numbers and neg fungitel             Interval History:   Intubated but now awake in ICU,improvedhypoxia 60% FiO2. No + cxs sputum neg  CXR 1/9 same              Medications:       insulin glargine  15 Units Subcutaneous Daily     vancomycin (VANCOCIN) IV  1,750 mg (central catheter) Intravenous Once     pantoprazole  40 mg Per Feeding Tube Daily     heparin  5,000 Units Subcutaneous BID     B and C vitamin Complex with folic acid  5 mL Per Feeding Tube Daily     protein modular  1 packet Per Feeding Tube TID     aspirin chewable tablet 81 mg  81 mg  "Per Feeding Tube QPM     dapsone  100 mg Per Feeding Tube At Bedtime     gabapentin  300 mg Per Feeding Tube QAM     gabapentin  600 mg Per Feeding Tube QPM     magnesium oxide (MAG-OX) tablet 400 mg  400 mg Per Feeding Tube At Bedtime     sodium chloride  5 mL Nebulization Q4H     albuterol  2.5 mg Nebulization Q4H     insulin aspart  1-6 Units Subcutaneous Q4H     sodium chloride (PF)  10 mL Intracatheter Q7 Days     piperacillin-tazobactam  2.25 g Intravenous Q6H     vancomycin place koenig - receiving intermittent dosing  1 each Does not apply See Admin Instructions     levofloxacin  500 mg Intravenous Q48H     - MEDICATION INSTRUCTIONS for Dialysis Patients -   Does not apply See Admin Instructions     iopamidol  100 mL Intravenous Once     sodium chloride (PF)  3 mL Intracatheter Q8H     abacavir  600 mg Oral QPM     calcium acetate  2,001 mg Oral TID w/meals     chlorhexidine  15 mL Swish & Spit BID     clopidogrel (PLAVIX) tablet 75 mg  75 mg Oral QPM     darunavir  800 mg Oral At Bedtime     dolutegravir  50 mg Oral At Bedtime     mycophenolate  500 mg Oral BID     ritonavir  100 mg Oral At Bedtime                  Physical Exam:   Blood pressure 165/68, pulse 61, temperature 97.9  F (36.6  C), temperature source Axillary, resp. rate 14, height 1.803 m (5' 11\"), weight 84.6 kg (186 lb 8.2 oz), SpO2 96 %.  Wt Readings from Last 2 Encounters:   01/11/17 84.6 kg (186 lb 8.2 oz)   11/28/16 88.905 kg (196 lb)     Vital Signs with Ranges  Temp:  [97.9  F (36.6  C)-99.4  F (37.4  C)] 97.9  F (36.6  C)  Heart Rate:  [61-92] 66  Resp:  [11-21] 14  BP: (138-168)/() 165/68 mmHg  FiO2 (%):  [60 %] 60 %  SpO2:  [93 %-97 %] 96 %    Constitutional: Intubated, awake and interactive   Lungs: Congestion to auscultation bilaterally, no crackles or wheezing   Cardiovascular: Regular rate and rhythm, normal S1 and S2, and no murmur noted   Abdomen: Normal bowel sounds, soft, non-distended, non-tender   Skin: No rashes, no " cyanosis, no edema   Other:           Data:   All microbiology laboratory data reviewed.  Recent Labs   Lab Test  01/11/17 0446  01/10/17   0345  01/09/17   0544   WBC  8.6  5.3  5.3   HGB  8.3*  6.4*  6.5*   HCT  25.8*  20.5*  21.0*   MCV  90  90  90   PLT  138*  127*  112*     Recent Labs   Lab Test  01/11/17   0446  01/10/17   0345 01/09/17   0544   CR  6.05*  8.87*  7.42*     No lab results found.  Recent Labs   Lab Test  01/07/17   1150  01/05/17 2012 01/05/17   2000  05/19/16   1730  03/05/16   2248  03/05/16   2247  03/05/16   2204  03/05/16   1115  07/06/14   2330   CULT  No growth  Incorrectly ordered by PCU/Clinic Canceled, Test credited ordered sputum culture   instead.    No growth  No growth  No growth  Test canceled by physician Duplicate request Charge credited  Test canceled by physician Duplicate request Charge credited  Test canceled by physician Duplicate request Charge credited  Test canceled by physician Duplicate request Charge credited  No growth  No growth  Charge credited Specimen not received Test canceled by PCU/Clinic  No growth

## 2017-01-11 NOTE — PLAN OF CARE
Problem: Ventilation, Mechanical Invasive (Adult)  Goal: Signs and Symptoms of Listed Potential Problems Will be Absent or Manageable (Ventilation, Mechanical Invasive)  Signs and symptoms of listed potential problems will be absent or manageable by discharge/transition of care (reference Ventilation, Mechanical Invasive (Adult) CPG).   Outcome: Improving  ICU End of Shift Summary.  For vital signs and complete assessments, please see documentation flowsheets.     Pertinent assessments: Now that the pt is re-intubated, propofol has been increased, pt has been comfortable, lungs have been suctioned almost every hr, lots of secretions and oral secretions, lungs were coarse last evening but now just diminished, is having a bm every 2 hrs, con't in a SR, VSS, low grade temp, resting comfortably.  Major Shift Events: self extubation and re-intubation  Plan (Upcoming Events): increase sedation, con't to suction frequently.  Discharge/Transfer Needs: TBD    Bedside Shift Report Completed yes  Bedside Safety Check Completed yes

## 2017-01-11 NOTE — PLAN OF CARE
Problem: Goal Outcome Summary  Goal: Goal Outcome Summary  Outcome: No Change  ICU End of Shift Summary.  For vital signs and complete assessments, please see documentation flowsheets.     Pertinent assessments: Patient remains intubated and sedated. Patient more restless when family here in the evening. Vitals stable. Has cough and respiratory secretions with repositioning. Plan for hemodialysis tomorrow.Tolerating tube feeding.   Major Shift Events: remains stable after re-intubation this morning.  Plan (Upcoming Events):  Continue to monitor  Discharge/Transfer Needs: to be determined    Bedside Shift Report Completed yes  Bedside Safety Check Completed yes

## 2017-01-11 NOTE — ANESTHESIA PREPROCEDURE EVALUATION
PAC NOTE:  PAC Discussion and Assessment    ASA Classification: 3  Case is suitable for:   Anesthetic techniques and relevant risks discussed: GA  Invasive monitoring and risk discussed: Yes  Types:   Possibility and Risk of blood transfusion discussed:   NPO instructions given:   Additional anesthetic preparation and risks discussed:   Needs early admission to pre-op area:   Other:     PAC Resident/NP Anesthesia Assessment:  Called for immediate intubation due to pt extubating himself.  Pt found with labored breathing.  O2 sats 91%.  Pt identified and reintubation discussed and accepted per pt.  Pt NPO x8 hours. K=4.0      Mid-Level Provider/Resident:   Date:   Time:     Attending Anesthesiologist Anesthesia Assessment:        Anesthesiologist:   Date:   Time:   Pass/Fail:   Disposition:     PAC Pharmacist Assessment:        Pharmacist:   Date:   Time:      ANESTHESIA PRE EVALUATION:                                      .

## 2017-01-12 LAB
ALBUMIN SERPL-MCNC: 2.2 G/DL (ref 3.4–5)
ALP SERPL-CCNC: 88 U/L (ref 40–150)
ALT SERPL W P-5'-P-CCNC: 152 U/L (ref 0–70)
ANION GAP SERPL CALCULATED.3IONS-SCNC: 14 MMOL/L (ref 3–14)
ANISOCYTOSIS BLD QL SMEAR: SLIGHT
AST SERPL W P-5'-P-CCNC: 66 U/L (ref 0–45)
BASOPHILS # BLD AUTO: 0.1 10E9/L (ref 0–0.2)
BASOPHILS NFR BLD AUTO: 1 %
BILIRUB SERPL-MCNC: 0.8 MG/DL (ref 0.2–1.3)
BUN SERPL-MCNC: 65 MG/DL (ref 7–30)
CALCIUM SERPL-MCNC: 9.2 MG/DL (ref 8.5–10.1)
CHLORIDE SERPL-SCNC: 94 MMOL/L (ref 94–109)
CO2 SERPL-SCNC: 29 MMOL/L (ref 20–32)
CREAT SERPL-MCNC: 7.76 MG/DL (ref 0.66–1.25)
DACRYOCYTES BLD QL SMEAR: SLIGHT
DIFFERENTIAL METHOD BLD: ABNORMAL
EOSINOPHIL # BLD AUTO: 0.7 10E9/L (ref 0–0.7)
EOSINOPHIL NFR BLD AUTO: 7 %
ERYTHROCYTE [DISTWIDTH] IN BLOOD BY AUTOMATED COUNT: 15.3 % (ref 10–15)
GFR SERPL CREATININE-BSD FRML MDRD: 7 ML/MIN/1.7M2
GLUCOSE BLDC GLUCOMTR-MCNC: 169 MG/DL (ref 70–99)
GLUCOSE BLDC GLUCOMTR-MCNC: 205 MG/DL (ref 70–99)
GLUCOSE BLDC GLUCOMTR-MCNC: 210 MG/DL (ref 70–99)
GLUCOSE BLDC GLUCOMTR-MCNC: 214 MG/DL (ref 70–99)
GLUCOSE BLDC GLUCOMTR-MCNC: 270 MG/DL (ref 70–99)
GLUCOSE BLDC GLUCOMTR-MCNC: 272 MG/DL (ref 70–99)
GLUCOSE SERPL-MCNC: 240 MG/DL (ref 70–99)
HCT VFR BLD AUTO: 24.6 % (ref 40–53)
HGB BLD-MCNC: 7.9 G/DL (ref 13.3–17.7)
HYPOCHROMIA BLD QL: PRESENT
LYMPHOCYTES # BLD AUTO: 1.2 10E9/L (ref 0.8–5.3)
LYMPHOCYTES NFR BLD AUTO: 13 %
MACROCYTES BLD QL SMEAR: PRESENT
MCH RBC QN AUTO: 29.3 PG (ref 26.5–33)
MCHC RBC AUTO-ENTMCNC: 32.1 G/DL (ref 31.5–36.5)
MCV RBC AUTO: 91 FL (ref 78–100)
METAMYELOCYTES # BLD: 0.5 10E9/L
METAMYELOCYTES NFR BLD MANUAL: 5 %
MICROCYTES BLD QL SMEAR: PRESENT
MONOCYTES # BLD AUTO: 0 10E9/L (ref 0–1.3)
MONOCYTES NFR BLD AUTO: 0 %
MYELOCYTES # BLD: 0.2 10E9/L
MYELOCYTES NFR BLD MANUAL: 2 %
NEUTROPHILS # BLD AUTO: 6.7 10E9/L (ref 1.6–8.3)
NEUTROPHILS NFR BLD AUTO: 72 %
PLATELET # BLD AUTO: 154 10E9/L (ref 150–450)
PLATELET # BLD EST: NORMAL 10*3/UL
POLYCHROMASIA BLD QL SMEAR: SLIGHT
POTASSIUM SERPL-SCNC: 4.1 MMOL/L (ref 3.4–5.3)
PROT SERPL-MCNC: 6.8 G/DL (ref 6.8–8.8)
RBC # BLD AUTO: 2.7 10E12/L (ref 4.4–5.9)
SODIUM SERPL-SCNC: 137 MMOL/L (ref 133–144)
WBC # BLD AUTO: 9.3 10E9/L (ref 4–11)

## 2017-01-12 PROCEDURE — 25000132 ZZH RX MED GY IP 250 OP 250 PS 637: Mod: GY | Performed by: INTERNAL MEDICINE

## 2017-01-12 PROCEDURE — 94640 AIRWAY INHALATION TREATMENT: CPT

## 2017-01-12 PROCEDURE — 94640 AIRWAY INHALATION TREATMENT: CPT | Mod: 76

## 2017-01-12 PROCEDURE — 40000275 ZZH STATISTIC RCP TIME EA 10 MIN

## 2017-01-12 PROCEDURE — 25000308 HC RX OP HPI UCR WEL MED 250 IP 250: Performed by: SURGERY

## 2017-01-12 PROCEDURE — 85025 COMPLETE CBC W/AUTO DIFF WBC: CPT | Performed by: INTERNAL MEDICINE

## 2017-01-12 PROCEDURE — 25000125 ZZHC RX 250: Performed by: INTERNAL MEDICINE

## 2017-01-12 PROCEDURE — 27210432 ZZH NUTRITION PRODUCT RENAL BASIC LITER

## 2017-01-12 PROCEDURE — 25000128 H RX IP 250 OP 636: Performed by: INTERNAL MEDICINE

## 2017-01-12 PROCEDURE — A9270 NON-COVERED ITEM OR SERVICE: HCPCS | Mod: GY | Performed by: INTERNAL MEDICINE

## 2017-01-12 PROCEDURE — 90937 HEMODIALYSIS REPEATED EVAL: CPT

## 2017-01-12 PROCEDURE — 94003 VENT MGMT INPAT SUBQ DAY: CPT

## 2017-01-12 PROCEDURE — 80053 COMPREHEN METABOLIC PANEL: CPT | Performed by: INTERNAL MEDICINE

## 2017-01-12 PROCEDURE — 99232 SBSQ HOSP IP/OBS MODERATE 35: CPT | Performed by: INTERNAL MEDICINE

## 2017-01-12 PROCEDURE — 20000003 ZZH R&B ICU

## 2017-01-12 PROCEDURE — 25000132 ZZH RX MED GY IP 250 OP 250 PS 637: Mod: GY | Performed by: SURGERY

## 2017-01-12 PROCEDURE — 63400005 ZZH RX 634: Performed by: INTERNAL MEDICINE

## 2017-01-12 PROCEDURE — 99291 CRITICAL CARE FIRST HOUR: CPT | Performed by: INTERNAL MEDICINE

## 2017-01-12 PROCEDURE — 00000146 ZZHCL STATISTIC GLUCOSE BY METER IP

## 2017-01-12 RX ORDER — NICOTINE POLACRILEX 4 MG
15-30 LOZENGE BUCCAL
Status: DISCONTINUED | OUTPATIENT
Start: 2017-01-12 | End: 2017-01-13

## 2017-01-12 RX ORDER — DEXTROSE MONOHYDRATE 25 G/50ML
25-50 INJECTION, SOLUTION INTRAVENOUS
Status: DISCONTINUED | OUTPATIENT
Start: 2017-01-12 | End: 2017-01-13

## 2017-01-12 RX ORDER — SODIUM CHLORIDE FOR INHALATION 3 %
4 VIAL, NEBULIZER (ML) INHALATION EVERY 4 HOURS
Status: DISCONTINUED | OUTPATIENT
Start: 2017-01-12 | End: 2017-01-18

## 2017-01-12 RX ADMIN — Medication 1 PACKET: at 22:11

## 2017-01-12 RX ADMIN — Medication 1 PACKET: at 16:14

## 2017-01-12 RX ADMIN — ALBUTEROL SULFATE 2.5 MG: 2.5 SOLUTION RESPIRATORY (INHALATION) at 12:45

## 2017-01-12 RX ADMIN — CHLORHEXIDINE GLUCONATE 15 ML: 1.2 RINSE ORAL at 22:07

## 2017-01-12 RX ADMIN — ALBUTEROL SULFATE 2.5 MG: 2.5 SOLUTION RESPIRATORY (INHALATION) at 23:51

## 2017-01-12 RX ADMIN — HEPARIN SODIUM 5000 UNITS: 5000 INJECTION, SOLUTION INTRAVENOUS; SUBCUTANEOUS at 08:41

## 2017-01-12 RX ADMIN — CALCIUM ACETATE 2001 MG: 667 CAPSULE ORAL at 12:21

## 2017-01-12 RX ADMIN — RITONAVIR 100 MG: 100 TABLET, FILM COATED ORAL at 22:08

## 2017-01-12 RX ADMIN — CALCIUM ACETATE 2001 MG: 667 CAPSULE ORAL at 17:59

## 2017-01-12 RX ADMIN — DAPSONE 100 MG: 25 TABLET ORAL at 22:09

## 2017-01-12 RX ADMIN — TAZOBACTAM SODIUM AND PIPERACILLIN SODIUM 2.25 G: 250; 2 INJECTION, SOLUTION INTRAVENOUS at 17:59

## 2017-01-12 RX ADMIN — MAGNESIUM OXIDE TAB 400 MG (241.3 MG ELEMENTAL MG) 400 MG: 400 (241.3 MG) TAB at 22:06

## 2017-01-12 RX ADMIN — PROPOFOL 55 MCG/KG/MIN: 10 INJECTION, EMULSION INTRAVENOUS at 19:39

## 2017-01-12 RX ADMIN — PROPOFOL 55 MCG/KG/MIN: 10 INJECTION, EMULSION INTRAVENOUS at 05:31

## 2017-01-12 RX ADMIN — INSULIN GLARGINE 15 UNITS: 100 INJECTION, SOLUTION SUBCUTANEOUS at 08:56

## 2017-01-12 RX ADMIN — SODIUM CHLORIDE SOLN NEBU 3% 5 ML: 3 NEBU SOLN at 08:10

## 2017-01-12 RX ADMIN — EPOETIN ALFA 5000 UNITS: 3000 SOLUTION INTRAVENOUS; SUBCUTANEOUS at 08:42

## 2017-01-12 RX ADMIN — ALBUTEROL SULFATE 2.5 MG: 2.5 SOLUTION RESPIRATORY (INHALATION) at 08:11

## 2017-01-12 RX ADMIN — ALBUTEROL SULFATE 2.5 MG: 2.5 SOLUTION RESPIRATORY (INHALATION) at 04:12

## 2017-01-12 RX ADMIN — SODIUM CHLORIDE, PRESERVATIVE FREE: 5 INJECTION INTRAVENOUS at 03:46

## 2017-01-12 RX ADMIN — LEVOFLOXACIN 500 MG: 5 INJECTION, SOLUTION INTRAVENOUS at 20:36

## 2017-01-12 RX ADMIN — SODIUM CHLORIDE SOLN NEBU 3% 4 ML: 3 NEBU SOLN at 20:10

## 2017-01-12 RX ADMIN — MYCOPHENOLATE MOFETIL 500 MG: 250 CAPSULE ORAL at 08:35

## 2017-01-12 RX ADMIN — INSULIN GLARGINE 20 UNITS: 100 INJECTION, SOLUTION SUBCUTANEOUS at 20:36

## 2017-01-12 RX ADMIN — DARUNAVIR 800 MG: 800 TABLET, FILM COATED ORAL at 22:08

## 2017-01-12 RX ADMIN — SODIUM CHLORIDE SOLN NEBU 3% 4 ML: 3 NEBU SOLN at 15:58

## 2017-01-12 RX ADMIN — HYDROMORPHONE HYDROCHLORIDE 0.5 MG: 10 INJECTION, SOLUTION INTRAMUSCULAR; INTRAVENOUS; SUBCUTANEOUS at 13:30

## 2017-01-12 RX ADMIN — ASPIRIN 81 MG 81 MG: 81 TABLET ORAL at 22:07

## 2017-01-12 RX ADMIN — PROPOFOL 55 MCG/KG/MIN: 10 INJECTION, EMULSION INTRAVENOUS at 16:43

## 2017-01-12 RX ADMIN — CHLORHEXIDINE GLUCONATE 15 ML: 1.2 RINSE ORAL at 12:14

## 2017-01-12 RX ADMIN — SODIUM CHLORIDE 250 ML: 9 INJECTION, SOLUTION INTRAVENOUS at 08:09

## 2017-01-12 RX ADMIN — ALBUTEROL SULFATE 2.5 MG: 2.5 SOLUTION RESPIRATORY (INHALATION) at 15:58

## 2017-01-12 RX ADMIN — PROPOFOL 55 MCG/KG/MIN: 10 INJECTION, EMULSION INTRAVENOUS at 08:44

## 2017-01-12 RX ADMIN — Medication 1 PACKET: at 08:34

## 2017-01-12 RX ADMIN — GABAPENTIN 600 MG: 250 SUSPENSION ORAL at 22:07

## 2017-01-12 RX ADMIN — PROPOFOL 55 MCG/KG/MIN: 10 INJECTION, EMULSION INTRAVENOUS at 12:15

## 2017-01-12 RX ADMIN — CLOPIDOGREL 75 MG: 75 TABLET, FILM COATED ORAL at 22:07

## 2017-01-12 RX ADMIN — SODIUM CHLORIDE SOLN NEBU 3% 5 ML: 3 NEBU SOLN at 04:12

## 2017-01-12 RX ADMIN — PROPOFOL 55 MCG/KG/MIN: 10 INJECTION, EMULSION INTRAVENOUS at 03:00

## 2017-01-12 RX ADMIN — PROPOFOL 55 MCG/KG/MIN: 10 INJECTION, EMULSION INTRAVENOUS at 22:45

## 2017-01-12 RX ADMIN — GABAPENTIN 300 MG: 250 SUSPENSION ORAL at 08:35

## 2017-01-12 RX ADMIN — SODIUM CHLORIDE SOLN NEBU 3% 4 ML: 3 NEBU SOLN at 23:51

## 2017-01-12 RX ADMIN — Medication 5 ML: at 08:35

## 2017-01-12 RX ADMIN — CALCIUM ACETATE 2001 MG: 667 CAPSULE ORAL at 08:36

## 2017-01-12 RX ADMIN — ABACAVIR 600 MG: 300 TABLET ORAL at 22:08

## 2017-01-12 RX ADMIN — HEPARIN SODIUM 5000 UNITS: 5000 INJECTION, SOLUTION INTRAVENOUS; SUBCUTANEOUS at 22:07

## 2017-01-12 RX ADMIN — TAZOBACTAM SODIUM AND PIPERACILLIN SODIUM 2.25 G: 250; 2 INJECTION, SOLUTION INTRAVENOUS at 05:31

## 2017-01-12 RX ADMIN — HYDROMORPHONE HYDROCHLORIDE 0.5 MG: 10 INJECTION, SOLUTION INTRAMUSCULAR; INTRAVENOUS; SUBCUTANEOUS at 17:59

## 2017-01-12 RX ADMIN — ALBUTEROL SULFATE 2.5 MG: 2.5 SOLUTION RESPIRATORY (INHALATION) at 20:10

## 2017-01-12 RX ADMIN — TAZOBACTAM SODIUM AND PIPERACILLIN SODIUM 2.25 G: 250; 2 INJECTION, SOLUTION INTRAVENOUS at 12:14

## 2017-01-12 RX ADMIN — Medication 40 MG: at 08:39

## 2017-01-12 NOTE — PROGRESS NOTES
Patient remained intubated and on ventilator at prescribed settings. Patient remains on 60% FiO2 SpO2 mid 90's. BS coarse, suctioning small amounts of creamy/thick secretions from ETT. Patient continues to receive Q4H Hypertonic Saline/Albuterol nebulizers. RT will continue to follow.    Kristi Bingham, RT 1/12/2017, 5:17 AM

## 2017-01-12 NOTE — PLAN OF CARE
Problem: Ventilation, Mechanical Invasive (Adult)  Goal: Signs and Symptoms of Listed Potential Problems Will be Absent or Manageable (Ventilation, Mechanical Invasive)  Signs and symptoms of listed potential problems will be absent or manageable by discharge/transition of care (reference Ventilation, Mechanical Invasive (Adult) CPG).   Outcome: Improving  ICU End of Shift Summary.  For vital signs and complete assessments, please see documentation flowsheets.     Pertinent assessments: Pt has been suctioned q 2 hr, secretions are less tonight than yesterday, with turning pt does open his eyes and motions with a thumbs up, lungs are coarse and diminished, had 2 stools this shift,  con't in a SR, VSS, afeb, wt is up 1 kg.  Major Shift Events: none  Plan (Upcoming Events): con't with the vent, dialize today.  Discharge/Transfer Needs:TBD     Bedside Shift Report Completed yes  Bedside Safety Check Completed yes

## 2017-01-12 NOTE — CONSULTS
CLINICAL NUTRITION SERVICES - REASSESSMENT NOTE      Recommendations Ordered by Registered Dietitian (RD):   Nepro @ 40 ml/hr x 24 hours (960 ml) + 3 pkts prostat  Free water flushes = 60 ml q 4 hours         EVALUATION OF PROGRESS TOWARD GOALS   Enteral Nutrition intake - Initiation / advancement and tolerance (stooling, biochemical review, weight trends)  Update/Evaluation: EN initiated on 1/9 and advanced to goal rate on 1/10 @ 1700. No documented residuals. Last BM = 1/11.     Biochemical review:  -Sodium - 137 mmol/L (wnl)  -K+ - 4.1 mmol/L (wnl)  -No recent Phos or Mg++  -TG (1/11) - 473 mg/dL (H) --> propofol   -BGs - 196-240 mg/dL (H)    Weight trending back down to baseline. Trends are as follows:  -1/3 - 89.041 kg  -1/5 - 85 kg  -1/8 - 86.8 kg  -1/10 - 87.8 kg  -1/11 - 84.6 kg  -1/12 - 85.9 kg     Propofol @ 29.4 ml/hr (776 kcals)     Nepro @ 40 ml/hr x 24 hours (960 ml) + 3 pkts prostat + propofol @ 29.4 ml/hr to provide 1856 kcals (22 kcal/kg - 87% of estimated energy needs) and 123 g protein (1.4 g/kg - 100% of estimated protein needs)    Procedures - Extubation  Update/Evaluation: Patient self-extubated on 1/11, but remained reintubation. Patient remains intubated on the ventilator.       Dosing Weight: 85 kg (dry weight)    ASSESSED NUTRITION NEEDS:  Estimated Energy Needs: 1942-4933 kcals (25-30 Kcal/Kg)  Justification: maintenance  Estimated Protein Needs: 102-170 grams protein (1.2-2 g pro/Kg)  Justification: hypercatabolism with critical illness and dialysis  Estimated Fluid Needs: 3977-0107  mL (1 mL/Kcal)  Justification: maintenance and per provider pending fluid status      NEW FINDINGS:   -HD today (1/12)    Previous Goals:   EN to advance to goal rate in the next 24 hours   Evaluation: Met    Previous Nutrition Diagnosis:   Inadequate protein-energy intake related to inability for PO secondary to intubation/sedation as evidenced by current intake (propofol) providing 23% of estimated energy  needs and 0% of estimated protein needs   Evaluation: Completed      MALNUTRITION: (assessed 1/9/2016)  % Weight Loss:  None noted  % Intake:  Decreased intake does not meet criteria for malnutrition   Subcutaneous Fat Loss:  None observed  Muscle Loss:  None observed  Fluid Retention:  None noted    Malnutrition Diagnosis: Patient does not meet two of the above criteria necessary for diagnosing malnutrition    CURRENT NUTRITION DIAGNOSIS  Predicted suboptimal nutrient intake (protein-energy) related to reliance on EN to meet estimated nutrition needs with potential for interruption secondary to ICU LOS    INTERVENTIONS  Recommendations / Nutrition Prescription  1. Recommend continuation of EN via NGT per the following regimen/provisions:     Goal EN Regimen/Provisions: Nepro @ 40 ml/hr x 24 hours (960 ml) + 3 pkts prostat to provide 1728 kcals (20 kcal/kg - 79% of estimated energy needs), 123 g protein (1.4 g/kg), 155 g CHO, 12 g fiber, 691 mg Phos, 1018 mg K+, 701 ml free water and 100% of DRIs.    Total intake (EN + propofol @ 29.4 ml/hr) = 2546 kcals (30 kcal/kg - 100% of estimated energy needs)    2. Free water flushes = 60 ml q 4 hours  3. Renal MVI+mineral     Implementation  EN Composition, EN Schedule, Feeding Tube Flush - Continue current EN regimen/provisions and free water flushes. Ongoing assessment of propofol contribution to total intake and need for EN adjustment    Collaboration and Referral of Nutrition care - Discussed patient during interdisciplinary rounds.    Goals  EN to provide % of estimated nutrition needs     MONITORING AND EVALUATION:  -Enteral Nutrition intake - Average intake / adequacy and tolerance (stooling, biochemical review, weight trends)  -Procedures - Extubation    Marianne Mendoza RD, LD  Clinical Dietitian  3rd Floor/ICU Pager: [phone]  All Other Floors Pager: [phone]  Weekend/Holiday Pager: [phone]

## 2017-01-12 NOTE — PROGRESS NOTES
Renal Medicine Inpatient Dialysis Note                                Donald Raza MRN# [id number]   Age: 66 year old YOB: 1950   Date of Admission: 1/3/2017 Hospital LOS: 9          Assessment/Plan:     Admitted with CP.  Developed respiratory failure secondary to pneumonia      1.  Respiratory Failure:  Due to pneumonia.  Cx unrevealing so far.  On broad spectrum abx.      2.  Septic Shock:  Also due to pneumonia.  Stabilizing.    3.  HIV:  On HAART.    4. ESRD: HD yesterday.  2 L removed.  K normal.  Volume status looks OK today.    5.  Anemia: MIHAI        Continue with current dialysis schedule        Interval History:     Seen while on run.  Dialysis parameters reviewed with dialysis RN    AVF at 400 ml/min.  3.5 hour run.  3.5 liter UF.  BP stable.  3K bath    Remains intubated and sedated      ROS     Intubated and sedated: ROS unable    Dialysis Parameters:     Vitals were reviewed  Patient Vitals for the past 8 hrs:   BP Temp Temp src Pulse Heart Rate Resp SpO2 Weight   01/12/17 1030 128/52 mmHg - - - - - - -   01/12/17 1000 125/52 mmHg - - - 62 18 98 % -   01/12/17 0915 139/63 mmHg - - - - - - -   01/12/17 0900 134/62 mmHg - - - 61 17 98 % -   01/12/17 0845 127/60 mmHg - - 64 - - - -   01/12/17 0830 128/60 mmHg - - 61 - - - -   01/12/17 0815 144/58 mmHg - - - - - 97 % -   01/12/17 0800 166/73 mmHg - - 59 58 16 97 % -   01/12/17 0730 159/75 mmHg - - 60 - - - 85.9 kg (189 lb 6 oz)   01/12/17 0600 154/69 mmHg - - - 66 15 95 % -   01/12/17 0500 153/65 mmHg - - - 69 16 95 % 85.9 kg (189 lb 6 oz)   01/12/17 0412 - - - - 68 - 98 % -   01/12/17 0400 153/68 mmHg 98  F (36.7  C) Axillary - 64 14 96 % -     I/O last 3 completed shifts:  In: 2567.71 [I.V.:1227.71; NG/GT:420]  Out: -     Filed Vitals:    01/10/17 0000 01/10/17 0730 01/11/17 0400 01/12/17 0500   Weight: 87.8 kg (193 lb 9 oz) 87.8 kg (193 lb 9 oz) 84.6 kg (186 lb 8.2 oz) 85.9 kg (189 lb 6 oz)    01/12/17 0730   Weight: 85.9 kg (189 lb 6  "oz)       Current Weight: 85.9  Dry Weight: 83 ?  Dialysis Temp: 36.5  C  Access Device: AVF  Access Site: left  Dialyzer: Revaclear  Dialysis Bath: 3  Sodium Profile: n  UF Goal: 3  Blood Flow Rate (mL/min): 400  Total Treatment Time (hrs): 3.5  Heparin: Low dose as required      EPO dose: y  Zemplar: n  IV Fe: n      Medications and Allergies:     Reviewed      Physical Exam:     Seen and examined during course of dialysis run    /52 mmHg  Pulse 64  Temp(Src) 98  F (36.7  C) (Axillary)  Resp 18  Ht 1.803 m (5' 11\")  Wt 85.9 kg (189 lb 6 oz)  BMI 26.42 kg/m2  SpO2 98%    GENERAL: intubated  HEENT: ETT  RESP: coarse  CV: RRR, normal S1 S2  ABDOMEN: S/NT, BS present  MS: no edema  EXT: access canulated without issue    Data:         Recent Labs  Lab 01/12/17  0526      POTASSIUM 4.1   CHLORIDE 94   CO2 29   ANIONGAP 14   *   BUN 65*   CR 7.76*   GFRESTIMATED 7*   GFRESTBLACK 8*   PRABHA 9.2       Recent Labs  Lab 01/12/17  0526 01/11/17  0446 01/10/17  0345 01/09/17  0544  01/08/17  0636   PHOS  --   --   --   --   --  5.1*   HGB 7.9* 8.3* 6.4* 6.5*  < > 6.9*   < > = values in this interval not displayed.      G David Fuller    [facility] - Nephrology  [phone]            "

## 2017-01-12 NOTE — PROGRESS NOTES
Resp Care: patient remained on full vent support, tolerated well. BS are coarse throughout both lungs, suctioned small  amount of thin clear secretion. No Vent setting made during this shift . Will continue to monitor patient's progress and assess patient's respiratory status.     Stanley Lenz  January 11, 2017.7:19 PM

## 2017-01-12 NOTE — PROGRESS NOTES
POTASSIUM        Date      Value      Ref Range      Status        01/12/2017      4.1mmol/L    3.4 - 5.3 mmol/L      Final          HGB    7.9g/dl    1/12/2017  Weight: 85.9 kg  1/12/2017      Patient dialyzed for 3.5 hrs. on a 3 K bath with a net fluid removal of 3L.  A BFR of 450 ml/min was obtained via a left upper arm AV fistula using 15 gauge needles.         Total heparin received during the treatment: 0 units. Sites held x 12 min then drsgs applied.  Meds  Given: Epogen, 5000u. Complications: none.  Procedure and ESRD teaching done.  See flowsheet in EPIC for further details and post assessment.     Machine water alarm in place and functioning.  Consent for dialysis signed.  1/7/2017  Vascular Access: Aseptic prep done for both on/off.  HEPATITIS B SURFACE ANTIGEN: negative DATE: 12/24/16  HEPATITIS B SURFACE ANTIBODY: unknown  Chlorine/Chloramine water system checked every 4 hours.  Report given to: JERSEY Wilson RN  [facility]

## 2017-01-12 NOTE — PROGRESS NOTES
[facility]  Infectious Disease Progress Note          Assessment and Plan:   IMPRESSION:    1.  A 66-year-old male, very complex medical history, known to me from prior admission, currently is admitted with chest pain, rule out coronary artery disease, now has developed some increased cough and respiratory symptoms, although chest x-ray is negative.  New acute fever in the hospital as well, question respiratory infection, although not initially obvious, no leukocytosis, not really productive sputum and again chest x-ray negative.    2.  Chronic human immunodeficiency virus infection for over 30 years, treated at [facility], most recent T cells were 3 weeks ago at which time T cells were 263 and undetectable virus, i.e., unlikely to have an opportunistic infection, is on a fairly complicated 5-drug regimen, but well controlled.    3.  Known coronary disease, does not appear he has that as an explanation for current symptoms.    4.  Diabetes mellitus.    5.  End-stage renal disease on chronic dialysis on the transplant list.    6.  Sulfa allergy.    7.  Nephrolithiasis.   8 Resp failure, infiltrate presumed pneumonia, intubated in ICU       RECOMMENDATIONS:    1.  Continue his current antiretroviral therapy, as best able while intubated.    2.  Continue vanco/zosyn, no + cxs, fever down, could stop vanco but low risk in this CRF pt so continue   3 Improving hypoxia, should not get PCP/OI at current HIV numbers and neg fungitel             Interval History:   Intubated but now awake in ICU,improvedhypoxia 60% FiO2. No + cxs sputum neg  CXR 1/9 same              Medications:       epoetin brittni (EPOGEN,PROCRIT) inj ESRD  5,000 Units Intravenous See Admin Instructions     insulin glargine  15 Units Subcutaneous Daily     pantoprazole  40 mg Per Feeding Tube Daily     heparin  5,000 Units Subcutaneous BID     B and C vitamin Complex with folic acid  5 mL Per Feeding Tube Daily     protein modular  " 1 packet Per Feeding Tube TID     aspirin chewable tablet 81 mg  81 mg Per Feeding Tube QPM     dapsone  100 mg Per Feeding Tube At Bedtime     gabapentin  300 mg Per Feeding Tube QAM     gabapentin  600 mg Per Feeding Tube QPM     magnesium oxide (MAG-OX) tablet 400 mg  400 mg Per Feeding Tube At Bedtime     sodium chloride  5 mL Nebulization Q4H     albuterol  2.5 mg Nebulization Q4H     insulin aspart  1-6 Units Subcutaneous Q4H     sodium chloride (PF)  10 mL Intracatheter Q7 Days     piperacillin-tazobactam  2.25 g Intravenous Q6H     vancomycin place koenig - receiving intermittent dosing  1 each Does not apply See Admin Instructions     levofloxacin  500 mg Intravenous Q48H     - MEDICATION INSTRUCTIONS for Dialysis Patients -   Does not apply See Admin Instructions     iopamidol  100 mL Intravenous Once     sodium chloride (PF)  3 mL Intracatheter Q8H     abacavir  600 mg Oral QPM     calcium acetate  2,001 mg Oral TID w/meals     chlorhexidine  15 mL Swish & Spit BID     clopidogrel (PLAVIX) tablet 75 mg  75 mg Oral QPM     darunavir  800 mg Oral At Bedtime     dolutegravir  50 mg Oral At Bedtime     mycophenolate  500 mg Oral BID     ritonavir  100 mg Oral At Bedtime                  Physical Exam:   Blood pressure 128/52, pulse 64, temperature 98  F (36.7  C), temperature source Axillary, resp. rate 18, height 1.803 m (5' 11\"), weight 85.9 kg (189 lb 6 oz), SpO2 98 %.  Wt Readings from Last 2 Encounters:   01/12/17 85.9 kg (189 lb 6 oz)   11/28/16 88.905 kg (196 lb)     Vital Signs with Ranges  Temp:  [97.8  F (36.6  C)-98.5  F (36.9  C)] 98  F (36.7  C)  Pulse:  [59-64] 64  Heart Rate:  [58-70] 62  Resp:  [11-18] 18  BP: (125-166)/(52-78) 128/52 mmHg  FiO2 (%):  [60 %] 60 %  SpO2:  [91 %-98 %] 98 %    Constitutional: Intubated, awake and interactive   Lungs: Congestion to auscultation bilaterally, no crackles or wheezing   Cardiovascular: Regular rate and rhythm, normal S1 and S2, and no murmur noted "   Abdomen: Normal bowel sounds, soft, non-distended, non-tender   Skin: No rashes, no cyanosis, no edema   Other:           Data:   All microbiology laboratory data reviewed.  Recent Labs   Lab Test  01/12/17   0526  01/11/17   0446  01/10/17   0345   WBC  9.3  8.6  5.3   HGB  7.9*  8.3*  6.4*   HCT  24.6*  25.8*  20.5*   MCV  91  90  90   PLT  154  138*  127*     Recent Labs   Lab Test  01/12/17   0526  01/11/17   0446  01/10/17   0345   CR  7.76*  6.05*  8.87*     No lab results found.  Recent Labs   Lab Test  01/07/17   1150  01/05/17 2012 01/05/17   2000  05/19/16   1730  03/05/16   2248  03/05/16   2247  03/05/16   2204  03/05/16   1115  07/06/14   2330   CULT  No growth  Incorrectly ordered by PCU/Clinic Canceled, Test credited ordered sputum culture   instead.    No growth  No growth  No growth  Test canceled by physician Duplicate request Charge credited  Test canceled by physician Duplicate request Charge credited  Test canceled by physician Duplicate request Charge credited  Test canceled by physician Duplicate request Charge credited  No growth  No growth  Charge credited Specimen not received Test canceled by PCU/Clinic  No growth

## 2017-01-12 NOTE — PROGRESS NOTES
[facility] Intensive Care Progress Note  Problem list  End-stage renal disease  HIV  Respiratory failure due to pneumonia  Type 2 diabetes  Chronic anemia    Date of Service (when I saw the patient): 01/12/2017     Assessment and Plan  Donald Raza is a 66 year old male with complex medical hx of HAART for hx of HIV, ESRD on HD, CAD, Hypertension, DM type 2 on insulin, chronic anemia  admitted on 1/3/2017 with episodes of chest pain and was determined non cardiac in etiology given recent heart cath findings.  Failed extubation on 1/9, self extubated on 1/10 and was reintubated      Neurology: arousable, appropriate  Rass goal -2-3  Plan: Propofol     Cardiovascular:  Septic shock requiring vasopressor, resolved. H/o CAD and multiple PCI. ECG unremarkable. He was started on echo showed LVH and left atrial dilatation  Plan:    Maintain normotension and euvolemia    Pulmonary:        Hypoxemic respiratory failure requiring intubation on 1/7 most likely sec to PNA: febrile, bilateral LL infiltrates.  Failed self extubation 1/10, due to secretions it sounds like; still with secretions suctioning  Plan: Continue broad spectrum ABX. Until all cultures available.   Nebulized saline solution to help clear secretions    Ventilation Mode: CMV/AC  FiO2 (%): 60 %  Rate Set (breaths/minute): 12 breaths/min  Tidal Volume Set (mL): 500 mL  PEEP (cm H2O): 8 cmH2O  Oxygen Concentration (%): 60 %  Resp: 17    Renal  Hyperkalemia on admission resolved. H/o ESRD on HD, T/R/S   Plan: Monitor.     ID:         Sepsis, probable pneumonia: HIV on antiretroviral therapy. Temp improved, cultures NGTD; Agree with ID rationale, PJP unlikely. Bronchoscopy can be performed but would probably not be helpful for bacterial etiology given time of antibiotics  Plan: Continue broad spectrum antibiotics (started 1/6) as clinically improved, no culture data to guide narrow    GI  No  concerns.    Nutrition  Malnutrition.  Plan: nutrition consult for TF    Endocrine:  H/o DM type II.   Plan: Monitor and treat hyperglycemia.      Heme/Onc:  Acute on chronic anemia. Thrombocytopenia. No clear source of bleeding.2 unit PRBC for hgb 5,4 and then 61. on 1/7/17 .   Plan: Hgb 8.3 today, transfused overnight   Reduce blood draws except for before dialysis    DVT Prophylaxis: Pneumatic Compression Devices  GI Prophylaxis: PPI    Restraints: Restraints for medical healing needed: YES    Family update by me today: no.    Georgie Crump    Time Spent on This Encounter  Billing:  I spent 35 minutes bedside and on the inpatient unit today managing the critical care of Donald Raza in relation to the issues listed in this note.    Main Plans for Today  Daily sedation holiday    Physical Exam  Temp: 98  F (36.7  C) Temp src: Axillary Temp  Min: 98  F (36.7  C)  Max: 98.5  F (36.9  C) BP: 162/82 mmHg Pulse: 64 Heart Rate: 76 Resp: 17 SpO2: 99 % O2 Device: Mechanical Ventilator    Filed Vitals:    01/11/17 0400 01/12/17 0500 01/12/17 0730   Weight: 84.6 kg (186 lb 8.2 oz) 85.9 kg (189 lb 6 oz) 85.9 kg (189 lb 6 oz)     I/O last 3 completed shifts:  In: 2567.71 [I.V.:1227.71; NG/GT:420]  Out: -     Neurologic: Sedated, grossly non-focal.   Cardiovascular: RRR, no murmurs and gallops, pulses present all 4 extremities.    Respiratory: breath sounds course bl, decrease bl LL.  GI: soft, non-tender, non-distended.    Skin/Extremities: pale, no deformities or edema.    Lines: No erythema or discharge at entry site for PICC Line  Current lines are required for patient management    Medications    propofol (DIPRIVAN) infusion Stopped (01/12/17 1250)     IV fluid REPLACEMENT ONLY       NaCl 10 mL/hr at 01/12/17 0346       sodium chloride  4 mL Nebulization Q4H     insulin glargine  15 Units Subcutaneous Daily     pantoprazole  40 mg Per Feeding Tube Daily     heparin  5,000 Units Subcutaneous BID     B and C vitamin  Complex with folic acid  5 mL Per Feeding Tube Daily     protein modular  1 packet Per Feeding Tube TID     aspirin chewable tablet 81 mg  81 mg Per Feeding Tube QPM     dapsone  100 mg Per Feeding Tube At Bedtime     gabapentin  300 mg Per Feeding Tube QAM     gabapentin  600 mg Per Feeding Tube QPM     magnesium oxide (MAG-OX) tablet 400 mg  400 mg Per Feeding Tube At Bedtime     albuterol  2.5 mg Nebulization Q4H     insulin aspart  1-6 Units Subcutaneous Q4H     sodium chloride (PF)  10 mL Intracatheter Q7 Days     piperacillin-tazobactam  2.25 g Intravenous Q6H     vancomycin place koenig - receiving intermittent dosing  1 each Does not apply See Admin Instructions     levofloxacin  500 mg Intravenous Q48H     - MEDICATION INSTRUCTIONS for Dialysis Patients -   Does not apply See Admin Instructions     iopamidol  100 mL Intravenous Once     sodium chloride (PF)  3 mL Intracatheter Q8H     abacavir  600 mg Oral QPM     calcium acetate  2,001 mg Oral TID w/meals     chlorhexidine  15 mL Swish & Spit BID     clopidogrel (PLAVIX) tablet 75 mg  75 mg Oral QPM     darunavir  800 mg Oral At Bedtime     dolutegravir  50 mg Oral At Bedtime     mycophenolate  500 mg Oral BID     ritonavir  100 mg Oral At Bedtime       Data    Recent Labs  Lab 01/12/17  0526 01/11/17  0446 01/10/17  0345 01/09/17  0544  01/08/17  0636   WBC 9.3 8.6 5.3 5.3  --  4.7   HGB 7.9* 8.3* 6.4* 6.5*  < > 6.9*   MCV 91 90 90 90  --  90    138* 127* 112*  --  93*   INR  --   --   --   --   --  1.41*    137 136 137  --  136   POTASSIUM 4.1 4.0 4.4 4.1  --  4.5   CHLORIDE 94 96 97 98  --  97   CO2 29 29 26 27  --  27   BUN 65* 42* 57* 48*  --  37*   CR 7.76* 6.05* 8.87* 7.42*  --  5.64*   ANIONGAP 14 12 13 12  --  12   PRABHA 9.2 9.0 8.9 8.1*  --  7.9*   * 211* 104* 107*  --  162*   ALBUMIN 2.2*  --   --  2.2*  --  2.3*   PROTTOTAL 6.8  --   --  6.0*  --  6.0*   BILITOTAL 0.8  --   --  0.7  --  0.8   ALKPHOS 88  --   --  50  --  52    *  --   --  264*  --  376*   AST 66*  --   --  224*  --  619*   < > = values in this interval not displayed.  No results found for this or any previous visit (from the past 24 hour(s)).   Georgie Crump

## 2017-01-13 LAB
GLUCOSE BLDC GLUCOMTR-MCNC: 233 MG/DL (ref 70–99)
GLUCOSE BLDC GLUCOMTR-MCNC: 250 MG/DL (ref 70–99)
GLUCOSE BLDC GLUCOMTR-MCNC: 281 MG/DL (ref 70–99)
GLUCOSE BLDC GLUCOMTR-MCNC: 286 MG/DL (ref 70–99)
GLUCOSE BLDC GLUCOMTR-MCNC: 303 MG/DL (ref 70–99)
GLUCOSE BLDC GLUCOMTR-MCNC: 328 MG/DL (ref 70–99)
TRIGL SERPL-MCNC: 1297 MG/DL
VANCOMYCIN SERPL-MCNC: 27.7 MG/L

## 2017-01-13 PROCEDURE — 94640 AIRWAY INHALATION TREATMENT: CPT

## 2017-01-13 PROCEDURE — 99232 SBSQ HOSP IP/OBS MODERATE 35: CPT | Performed by: INTERNAL MEDICINE

## 2017-01-13 PROCEDURE — 25000132 ZZH RX MED GY IP 250 OP 250 PS 637: Mod: GY | Performed by: INTERNAL MEDICINE

## 2017-01-13 PROCEDURE — 25000128 H RX IP 250 OP 636: Performed by: INTERNAL MEDICINE

## 2017-01-13 PROCEDURE — A9270 NON-COVERED ITEM OR SERVICE: HCPCS | Mod: GY | Performed by: INTERNAL MEDICINE

## 2017-01-13 PROCEDURE — 94640 AIRWAY INHALATION TREATMENT: CPT | Mod: 76

## 2017-01-13 PROCEDURE — 25000308 HC RX OP HPI UCR WEL MED 250 IP 250: Performed by: SURGERY

## 2017-01-13 PROCEDURE — 84478 ASSAY OF TRIGLYCERIDES: CPT

## 2017-01-13 PROCEDURE — 00000146 ZZHCL STATISTIC GLUCOSE BY METER IP

## 2017-01-13 PROCEDURE — 25000125 ZZHC RX 250: Performed by: INTERNAL MEDICINE

## 2017-01-13 PROCEDURE — 20000003 ZZH R&B ICU

## 2017-01-13 PROCEDURE — 94003 VENT MGMT INPAT SUBQ DAY: CPT

## 2017-01-13 PROCEDURE — 99291 CRITICAL CARE FIRST HOUR: CPT | Performed by: INTERNAL MEDICINE

## 2017-01-13 PROCEDURE — 80202 ASSAY OF VANCOMYCIN: CPT

## 2017-01-13 PROCEDURE — 40000275 ZZH STATISTIC RCP TIME EA 10 MIN

## 2017-01-13 RX ORDER — LOSARTAN POTASSIUM 100 MG/1
100 TABLET ORAL AT BEDTIME
Status: DISCONTINUED | OUTPATIENT
Start: 2017-01-13 | End: 2017-01-14

## 2017-01-13 RX ORDER — MYCOPHENOLATE MOFETIL 200 MG/ML
500 POWDER, FOR SUSPENSION ORAL 2 TIMES DAILY
Status: DISCONTINUED | OUTPATIENT
Start: 2017-01-13 | End: 2017-01-14

## 2017-01-13 RX ORDER — HYDRALAZINE HYDROCHLORIDE 20 MG/ML
10 INJECTION INTRAMUSCULAR; INTRAVENOUS EVERY 4 HOURS PRN
Status: DISCONTINUED | OUTPATIENT
Start: 2017-01-13 | End: 2017-01-22 | Stop reason: HOSPADM

## 2017-01-13 RX ORDER — CARVEDILOL 25 MG/1
25 TABLET ORAL 2 TIMES DAILY WITH MEALS
Status: DISCONTINUED | OUTPATIENT
Start: 2017-01-13 | End: 2017-01-14

## 2017-01-13 RX ORDER — SODIUM CHLORIDE 9 MG/ML
INJECTION, SOLUTION INTRAVENOUS CONTINUOUS
Status: DISCONTINUED | OUTPATIENT
Start: 2017-01-13 | End: 2017-01-19

## 2017-01-13 RX ADMIN — GABAPENTIN 600 MG: 250 SUSPENSION ORAL at 21:48

## 2017-01-13 RX ADMIN — CHLORHEXIDINE GLUCONATE 15 ML: 1.2 RINSE ORAL at 08:11

## 2017-01-13 RX ADMIN — MYCOPHENOLATE MOFETIL 500 MG: 200 POWDER, FOR SUSPENSION ORAL at 20:57

## 2017-01-13 RX ADMIN — TAZOBACTAM SODIUM AND PIPERACILLIN SODIUM 2.25 G: 250; 2 INJECTION, SOLUTION INTRAVENOUS at 18:41

## 2017-01-13 RX ADMIN — ALBUTEROL SULFATE 2.5 MG: 2.5 SOLUTION RESPIRATORY (INHALATION) at 11:48

## 2017-01-13 RX ADMIN — CARVEDILOL 25 MG: 25 TABLET, FILM COATED ORAL at 21:14

## 2017-01-13 RX ADMIN — INSULIN GLARGINE 26 UNITS: 100 INJECTION, SOLUTION SUBCUTANEOUS at 21:01

## 2017-01-13 RX ADMIN — TAZOBACTAM SODIUM AND PIPERACILLIN SODIUM 2.25 G: 250; 2 INJECTION, SOLUTION INTRAVENOUS at 00:18

## 2017-01-13 RX ADMIN — Medication 1 PACKET: at 08:13

## 2017-01-13 RX ADMIN — HEPARIN SODIUM 5000 UNITS: 5000 INJECTION, SOLUTION INTRAVENOUS; SUBCUTANEOUS at 08:12

## 2017-01-13 RX ADMIN — CALCIUM ACETATE 2001 MG: 667 CAPSULE ORAL at 08:12

## 2017-01-13 RX ADMIN — DEXMEDETOMIDINE 0.5 MCG/KG/HR: 100 INJECTION, SOLUTION, CONCENTRATE INTRAVENOUS at 21:51

## 2017-01-13 RX ADMIN — TAZOBACTAM SODIUM AND PIPERACILLIN SODIUM 2.25 G: 250; 2 INJECTION, SOLUTION INTRAVENOUS at 06:17

## 2017-01-13 RX ADMIN — Medication 40 MG: at 08:13

## 2017-01-13 RX ADMIN — CALCIUM ACETATE 2001 MG: 667 CAPSULE ORAL at 18:43

## 2017-01-13 RX ADMIN — SODIUM CHLORIDE SOLN NEBU 3% 4 ML: 3 NEBU SOLN at 19:29

## 2017-01-13 RX ADMIN — Medication 5 ML: at 08:14

## 2017-01-13 RX ADMIN — MYCOPHENOLATE MOFETIL 500 MG: 200 POWDER, FOR SUSPENSION ORAL at 00:59

## 2017-01-13 RX ADMIN — GABAPENTIN 300 MG: 250 SUSPENSION ORAL at 08:14

## 2017-01-13 RX ADMIN — ALBUTEROL SULFATE 2.5 MG: 2.5 SOLUTION RESPIRATORY (INHALATION) at 19:29

## 2017-01-13 RX ADMIN — SODIUM CHLORIDE SOLN NEBU 3% 4 ML: 3 NEBU SOLN at 04:01

## 2017-01-13 RX ADMIN — MYCOPHENOLATE MOFETIL 500 MG: 200 POWDER, FOR SUSPENSION ORAL at 08:14

## 2017-01-13 RX ADMIN — DAPSONE 100 MG: 25 TABLET ORAL at 21:49

## 2017-01-13 RX ADMIN — ALBUTEROL SULFATE 2.5 MG: 2.5 SOLUTION RESPIRATORY (INHALATION) at 07:32

## 2017-01-13 RX ADMIN — ASPIRIN 81 MG 81 MG: 81 TABLET ORAL at 21:14

## 2017-01-13 RX ADMIN — CHLORHEXIDINE GLUCONATE 15 ML: 1.2 RINSE ORAL at 21:11

## 2017-01-13 RX ADMIN — CALCIUM ACETATE 2001 MG: 667 CAPSULE ORAL at 12:33

## 2017-01-13 RX ADMIN — HEPARIN SODIUM 5000 UNITS: 5000 INJECTION, SOLUTION INTRAVENOUS; SUBCUTANEOUS at 20:57

## 2017-01-13 RX ADMIN — PROPOFOL 55 MCG/KG/MIN: 10 INJECTION, EMULSION INTRAVENOUS at 02:02

## 2017-01-13 RX ADMIN — SODIUM CHLORIDE, PRESERVATIVE FREE: 5 INJECTION INTRAVENOUS at 12:30

## 2017-01-13 RX ADMIN — PROPOFOL 45 MCG/KG/MIN: 10 INJECTION, EMULSION INTRAVENOUS at 06:15

## 2017-01-13 RX ADMIN — RITONAVIR 100 MG: 100 TABLET, FILM COATED ORAL at 21:49

## 2017-01-13 RX ADMIN — ABACAVIR 600 MG: 300 TABLET ORAL at 20:58

## 2017-01-13 RX ADMIN — SODIUM CHLORIDE SOLN NEBU 3% 4 ML: 3 NEBU SOLN at 15:30

## 2017-01-13 RX ADMIN — LOSARTAN POTASSIUM 100 MG: 100 TABLET, FILM COATED ORAL at 20:56

## 2017-01-13 RX ADMIN — ALBUTEROL SULFATE 2.5 MG: 2.5 SOLUTION RESPIRATORY (INHALATION) at 04:01

## 2017-01-13 RX ADMIN — TAZOBACTAM SODIUM AND PIPERACILLIN SODIUM 2.25 G: 250; 2 INJECTION, SOLUTION INTRAVENOUS at 12:29

## 2017-01-13 RX ADMIN — SODIUM CHLORIDE SOLN NEBU 3% 4 ML: 3 NEBU SOLN at 11:48

## 2017-01-13 RX ADMIN — DARUNAVIR 800 MG: 800 TABLET, FILM COATED ORAL at 20:59

## 2017-01-13 RX ADMIN — SODIUM CHLORIDE SOLN NEBU 3% 4 ML: 3 NEBU SOLN at 07:32

## 2017-01-13 RX ADMIN — CLOPIDOGREL 75 MG: 75 TABLET, FILM COATED ORAL at 20:57

## 2017-01-13 RX ADMIN — DEXMEDETOMIDINE 0.2 MCG/KG/HR: 100 INJECTION, SOLUTION, CONCENTRATE INTRAVENOUS at 12:15

## 2017-01-13 RX ADMIN — Medication 1 PACKET: at 21:54

## 2017-01-13 RX ADMIN — INSULIN GLARGINE 20 UNITS: 100 INJECTION, SOLUTION SUBCUTANEOUS at 08:00

## 2017-01-13 RX ADMIN — MAGNESIUM OXIDE TAB 400 MG (241.3 MG ELEMENTAL MG) 400 MG: 400 (241.3 MG) TAB at 21:49

## 2017-01-13 RX ADMIN — Medication 1 PACKET: at 16:56

## 2017-01-13 RX ADMIN — ALBUTEROL SULFATE 2.5 MG: 2.5 SOLUTION RESPIRATORY (INHALATION) at 15:30

## 2017-01-13 NOTE — PROGRESS NOTES
Remained vented alll night; now on CMV12/55/500/+8, moderate amt of thick yellow secretions; afibile, VSS; liquid stool

## 2017-01-13 NOTE — PROGRESS NOTES
"[facility]  Hospitalist Progress Note  Susan Farah MD 01/12/2017    Reason for Stay (Diagnosis): presented with chest pain then became septic and hypoxic         Assessment and Plan:      Summary of Stay: Donald Raza is a 66 year old male admitted on 1/3/2017    Problem List:   1. Presented with chest pain then became septic and hypoxic  2. Bilateral pneumonia  3. Acute hypoxic respiratory failure  4. ESRD  On dialysis  5. Progressive anemia s/p transfusion  6. HIV on antivirals  7. Stable CAD  8. Hypertension and diabetes    PLAN  Continue ventilatory support  S/p dialysis run today  Increase insulin dosing will increase lantus  From 15 units daily to 20 units bid  And increase novolog to  6 units q 4  Continue levaquin and zosyn          Interval History (Subjective):      Dialysis today                  Physical Exam:      Last Vital Signs:  /62 mmHg  Pulse 64  Temp(Src) 98  F (36.7  C) (Axillary)  Resp 16  Ht 1.803 m (5' 11\")  Wt 85.9 kg (189 lb 6 oz)  BMI 26.42 kg/m2  SpO2 96%      Constitutional: Sedated and remains intubated   Respiratory: Clear to auscultation bilaterally, no rales or wheezing  Scattered  Rhonchi    FIO2 60% rate 12/min  peep 8cm   Cardiovascular: Regular rate and rhythm, normal S1 and S2, and no murmur noted   Abdomen: Normal bowel sounds, soft, non-distended, non-tender   Skin: No rashes, no cyanosis, dry to touch   Neuro:  no focal weakness when sedation lifted,   Extremities: No edema,    Other(s): No fevers       All other systems: Negative          Medications:      All current medications were reviewed with changes reflected in problem list.         Data:      All new lab and imaging data was reviewed.   Labs: Na 137 K 4.1  Bicarb 29 creat 7.76 glucose 272 wbc 9.3 hemoglobin 7.9  Imaging: CT 1/6 showed bilat consolidation   Chest xray 1/11 clear  "

## 2017-01-13 NOTE — PROGRESS NOTES
Resp Care: patient remained on full vent support, tolerated well. BS are coarse throughout both lungs, suctioned moderate amount of thick cloudy secretion. No change made with the vent setting in this shift. Will continue to monitor patient's progress and assess patient's respiratory status     Stanley Lenz  January 12, 2017

## 2017-01-13 NOTE — PROGRESS NOTES
[facility]  Infectious Disease Progress Note          Assessment and Plan:   IMPRESSION:    1.  A 66-year-old male, very complex medical history, known to me from prior admission, currently is admitted with chest pain, rule out coronary artery disease, now has developed some increased cough and respiratory symptoms, although chest x-ray is negative.  New acute fever in the hospital as well, question respiratory infection, although not initially obvious, no leukocytosis, not really productive sputum and again chest x-ray negative.    2.  Chronic human immunodeficiency virus infection for over 30 years, treated at [facility], most recent T cells were 3 weeks ago at which time T cells were 263 and undetectable virus, i.e., unlikely to have an opportunistic infection, is on a fairly complicated 5-drug regimen, but well controlled.    3.  Known coronary disease, does not appear he has that as an explanation for current symptoms.    4.  Diabetes mellitus.    5.  End-stage renal disease on chronic dialysis on the transplant list.    6.  Sulfa allergy.    7.  Nephrolithiasis.   8 Resp failure, infiltrate presumed pneumonia, intubated in ICU       RECOMMENDATIONS:    1.  Continue his current antiretroviral therapy, as best able while intubated.    2.  Continue vanco/zosyn, no + cxs, fever down, could stop vanco but low risk in this CRF pt so continue   3 Improving hypoxia, should not get PCP/OI at current HIV numbers and neg fungitel             Interval History:   Intubated but now awake in ICU,improvedhypoxia 55% FiO2. No + cxs sputum neg  CXR 1/9 same.  Discussed with family.              Medications:       mycophenolate  500 mg Per Feeding Tube BID     insulin aspart  3 Units Subcutaneous Q4H     sodium chloride  4 mL Nebulization Q4H     insulin glargine  20 Units Subcutaneous BID     insulin aspart  1-12 Units Subcutaneous Q4H     pantoprazole  40 mg Per Feeding Tube Daily     heparin  5,000  "Units Subcutaneous BID     B and C vitamin Complex with folic acid  5 mL Per Feeding Tube Daily     protein modular  1 packet Per Feeding Tube TID     aspirin chewable tablet 81 mg  81 mg Per Feeding Tube QPM     dapsone  100 mg Per Feeding Tube At Bedtime     gabapentin  300 mg Per Feeding Tube QAM     gabapentin  600 mg Per Feeding Tube QPM     magnesium oxide (MAG-OX) tablet 400 mg  400 mg Per Feeding Tube At Bedtime     albuterol  2.5 mg Nebulization Q4H     sodium chloride (PF)  10 mL Intracatheter Q7 Days     piperacillin-tazobactam  2.25 g Intravenous Q6H     vancomycin place koenig - receiving intermittent dosing  1 each Does not apply See Admin Instructions     levofloxacin  500 mg Intravenous Q48H     - MEDICATION INSTRUCTIONS for Dialysis Patients -   Does not apply See Admin Instructions     iopamidol  100 mL Intravenous Once     sodium chloride (PF)  3 mL Intracatheter Q8H     abacavir  600 mg Oral QPM     calcium acetate  2,001 mg Oral TID w/meals     chlorhexidine  15 mL Swish & Spit BID     clopidogrel (PLAVIX) tablet 75 mg  75 mg Oral QPM     darunavir  800 mg Oral At Bedtime     dolutegravir  50 mg Oral At Bedtime     ritonavir  100 mg Oral At Bedtime                  Physical Exam:   Blood pressure 169/69, pulse 64, temperature 98.1  F (36.7  C), temperature source Axillary, resp. rate 21, height 1.803 m (5' 11\"), weight 85.9 kg (189 lb 6 oz), SpO2 97 %.  Wt Readings from Last 2 Encounters:   01/12/17 85.9 kg (189 lb 6 oz)   11/28/16 88.905 kg (196 lb)     Vital Signs with Ranges  Temp:  [98.1  F (36.7  C)-98.4  F (36.9  C)] 98.1  F (36.7  C)  Heart Rate:  [63-88] 76  Resp:  [13-21] 21  BP: (135-184)/(52-93) 169/69 mmHg  FiO2 (%):  [40 %-60 %] 55 %  SpO2:  [90 %-99 %] 97 %    Constitutional: Intubated, awake and interactive   Lungs: Congestion to auscultation bilaterally, no crackles or wheezing   Cardiovascular: Regular rate and rhythm, normal S1 and S2, and no murmur noted   Abdomen: Normal " bowel sounds, soft, non-distended, non-tender   Skin: No rashes, no cyanosis, no edema   Other:           Data:   All microbiology laboratory data reviewed.  Recent Labs   Lab Test  01/12/17   0526  01/11/17   0446  01/10/17   0345   WBC  9.3  8.6  5.3   HGB  7.9*  8.3*  6.4*   HCT  24.6*  25.8*  20.5*   MCV  91  90  90   PLT  154  138*  127*     Recent Labs   Lab Test  01/12/17   0526  01/11/17   0446  01/10/17   0345   CR  7.76*  6.05*  8.87*     No lab results found.  Recent Labs   Lab Test  01/07/17   1150  01/05/17 2012 01/05/17   2000  05/19/16   1730  03/05/16   2248  03/05/16   2247  03/05/16   2204  03/05/16   1115  07/06/14   2330   CULT  No growth  Incorrectly ordered by PCU/Clinic Canceled, Test credited ordered sputum culture   instead.    No growth  No growth  No growth  Test canceled by physician Duplicate request Charge credited  Test canceled by physician Duplicate request Charge credited  Test canceled by physician Duplicate request Charge credited  Test canceled by physician Duplicate request Charge credited  No growth  No growth  Charge credited Specimen not received Test canceled by PCU/Clinic  No growth

## 2017-01-13 NOTE — PROGRESS NOTES
[facility] Intensive Care Progress Note  Problem list  End-stage renal disease  HIV  Respiratory failure due to pneumonia  Type 2 diabetes  Chronic anemia    Date of Service (when I saw the patient): 01/13/2017     Assessment and Plan  Donald Raza is a 66 year old male with complex medical hx of HAART for hx of HIV, ESRD on HD, CAD, Hypertension, DM type 2 on insulin, chronic anemia  admitted on 1/3/2017 with episodes of chest pain and was determined non cardiac in etiology given recent heart cath findings.  Failed extubation on 1/9, self extubated on 1/10 and was reintubated      Neurology: arousable, appropriate  Rass goal -2-3  Plan: Propofol discontinued due to hypertriglyceridemia, switch to Precedex, p.r.n. fentanyl and Versed    Cardiovascular:  Septic shock requiring vasopressor, resolved. H/o CAD and multiple PCI. ECG unremarkable. He was started on echo showed LVH and left atrial dilatation  Plan:    Maintain normotension and euvolemia    Pulmonary:        Hypoxemic respiratory failure requiring intubation on 1/7 most likely sec to PNA: febrile, bilateral LL infiltrates.  Failed self extubation 1/10, due to secretions it sounds like; still with secretions suctioning  Plan: Continue broad spectrum ABX. Until all cultures available.   Nebulized saline solution to help clear secretions    Ventilation Mode: CMV/AC  FiO2 (%): 55 %  Rate Set (breaths/minute): 12 breaths/min  Tidal Volume Set (mL): 500 mL  PEEP (cm H2O): 8 cmH2O  Pressure Support (cm H2O): 8 cmH2O  Oxygen Concentration (%): 55 %  Resp: 21    Renal  Hyperkalemia on admission resolved. H/o ESRD on HD, T/R/S   Plan: Monitor.     ID:         Sepsis, probable pneumonia: HIV on antiretroviral therapy. Temp improved, cultures NGTD; Agree with ID rationale, PJP unlikely. Bronchoscopy can be performed but would probably not be helpful for bacterial etiology given time of antibiotics  Plan: Continue broad spectrum  antibiotics (started 1/6) as clinically improved, no culture data to guide narrow  Probably stop tomorrow, will await ID input    GI  No concerns.    Nutrition  Malnutrition.  Plan: nutrition consult for TF    Endocrine:  H/o DM type II.   Plan: Monitor and treat hyperglycemia.      Heme/Onc:  Acute on chronic anemia. Thrombocytopenia. No clear source of bleeding.2 unit PRBC for hgb 5,4 and then 61. on 1/7/17 .   Plan: Hgb 8.3 today, transfused overnight   Reduce blood draws except for before dialysis    DVT Prophylaxis: Pneumatic Compression Devices  GI Prophylaxis: PPI    Restraints: Restraints for medical healing needed: YES    Family update by me today: no.    Georgie Crump    Time Spent on This Encounter  Billing:  I spent 35 minutes bedside and on the inpatient unit today managing the critical care of Donald Raza in relation to the issues listed in this note.    Main Plans for Today  Daily sedation holiday    Physical Exam  Temp: 98.1  F (36.7  C) Temp src: Axillary Temp  Min: 98  F (36.7  C)  Max: 98.4  F (36.9  C) BP: 144/54 mmHg   Heart Rate: 76 Resp: 21 SpO2: 92 % O2 Device: Mechanical Ventilator    Filed Vitals:    01/11/17 0400 01/12/17 0500 01/12/17 0730   Weight: 84.6 kg (186 lb 8.2 oz) 85.9 kg (189 lb 6 oz) 85.9 kg (189 lb 6 oz)     I/O last 3 completed shifts:  In: 2168.65 [I.V.:848.65; NG/GT:360]  Out: 3000 [Other:3000]    Neurologic: Arousable, grossly non-focal.   Cardiovascular: RRR, no murmurs and gallops, pulses present all 4 extremities.    Respiratory: breath sounds course bl, decrease bl LL.  GI: soft, non-tender, non-distended.    Skin/Extremities: pale, no deformities or edema.    Lines: No erythema or discharge at entry site for PICC Line  Current lines are required for patient management    Medications    propofol (DIPRIVAN) infusion Stopped (01/13/17 1130)     IV fluid REPLACEMENT ONLY       NaCl 10 mL/hr at 01/13/17 0724       mycophenolate  500 mg Per Feeding Tube BID      insulin aspart  3 Units Subcutaneous Q4H     sodium chloride  4 mL Nebulization Q4H     insulin glargine  20 Units Subcutaneous BID     insulin aspart  1-12 Units Subcutaneous Q4H     pantoprazole  40 mg Per Feeding Tube Daily     heparin  5,000 Units Subcutaneous BID     B and C vitamin Complex with folic acid  5 mL Per Feeding Tube Daily     protein modular  1 packet Per Feeding Tube TID     aspirin chewable tablet 81 mg  81 mg Per Feeding Tube QPM     dapsone  100 mg Per Feeding Tube At Bedtime     gabapentin  300 mg Per Feeding Tube QAM     gabapentin  600 mg Per Feeding Tube QPM     magnesium oxide (MAG-OX) tablet 400 mg  400 mg Per Feeding Tube At Bedtime     albuterol  2.5 mg Nebulization Q4H     sodium chloride (PF)  10 mL Intracatheter Q7 Days     piperacillin-tazobactam  2.25 g Intravenous Q6H     vancomycin place koenig - receiving intermittent dosing  1 each Does not apply See Admin Instructions     levofloxacin  500 mg Intravenous Q48H     - MEDICATION INSTRUCTIONS for Dialysis Patients -   Does not apply See Admin Instructions     iopamidol  100 mL Intravenous Once     sodium chloride (PF)  3 mL Intracatheter Q8H     abacavir  600 mg Oral QPM     calcium acetate  2,001 mg Oral TID w/meals     chlorhexidine  15 mL Swish & Spit BID     clopidogrel (PLAVIX) tablet 75 mg  75 mg Oral QPM     darunavir  800 mg Oral At Bedtime     dolutegravir  50 mg Oral At Bedtime     ritonavir  100 mg Oral At Bedtime       Data    Recent Labs  Lab 01/12/17  0526 01/11/17  0446 01/10/17  0345 01/09/17  0544  01/08/17  0636   WBC 9.3 8.6 5.3 5.3  --  4.7   HGB 7.9* 8.3* 6.4* 6.5*  < > 6.9*   MCV 91 90 90 90  --  90    138* 127* 112*  --  93*   INR  --   --   --   --   --  1.41*    137 136 137  --  136   POTASSIUM 4.1 4.0 4.4 4.1  --  4.5   CHLORIDE 94 96 97 98  --  97   CO2 29 29 26 27  --  27   BUN 65* 42* 57* 48*  --  37*   CR 7.76* 6.05* 8.87* 7.42*  --  5.64*   ANIONGAP 14 12 13 12  --  12   PRABHA 9.2 9.0 8.9  8.1*  --  7.9*   * 211* 104* 107*  --  162*   ALBUMIN 2.2*  --   --  2.2*  --  2.3*   PROTTOTAL 6.8  --   --  6.0*  --  6.0*   BILITOTAL 0.8  --   --  0.7  --  0.8   ALKPHOS 88  --   --  50  --  52   *  --   --  264*  --  376*   AST 66*  --   --  224*  --  619*   < > = values in this interval not displayed.  No results found for this or any previous visit (from the past 24 hour(s)).   Georgie Crump

## 2017-01-13 NOTE — PROGRESS NOTES
Respiratory Therapy    Patient remains intubated and on full mechanical ventilator support throughout the shift. He is receiving albuterol Q4 and 4mL of sodium chloride Q4 inline with ventilator circuit. Breath sounds are coarse throughout, suctioning out a small amount of thick tan secretions via ETT. Will continue to assess and monitor.    Romina Gerard RT  1/13/2017

## 2017-01-13 NOTE — PLAN OF CARE
Problem: Individualization  Goal: Patient Preferences  Outcome: Improving  ICU End of Shift Summary.  For vital signs and complete assessments, please see documentation flowsheets.     Pertinent assessments:  Pt triglycerides elevated so switched to Precedex.  Pt was off sedation for >1 hour today and was alert answering questions appropriately.    Major Shift Events:    Plan (Upcoming Events):  Get pt up to chair.  Pt refusing but will try again once new sedation working.  Discharge/Transfer Needs:  Continues to have fair amount of secretions so no wean today.  Continue on Vent.    Bedside Shift Report Completed   Bedside Safety Check Completed

## 2017-01-13 NOTE — PROGRESS NOTES
"[facility]  Hospitalist Progress Note  Susan Farah MD 01/13/2017    Reason for Stay (Diagnosis): presented with chest pain then became septic and hypoxic         Assessment and Plan:      Summary of Stay: Donald Raza is a 66 year old male admitted on 1/3/2017    Problem List:   1. Presented with chest pain then became septic ( needed vasopressors) and hypoxic  2. Bilateral basilar pneumonia  3. Acute hypoxic respiratory failure- intubated 1/7 and failed extubation 1/9  4. ESRD  On dialysis  5. Progressive anemia s/p transfusion-s/p 2 units 1/10  6. HIV on antivirals  non-detectable viral load  7. Stable CAD  8. Hypertension and diabetes    PLAN  Continue ventilatory support  As is still having copious secretions  and had to re-intubated last time because of failure to clear secretions  continue insulin dosing --increase lantus from 20 units bid to 26 units bid  And increase novolog to  6 units q 4 along with sliding scale  Continue levaquin and zosyn  Discontinue propofol drip for sedation as triglyceride level is high  precedex  With prn versed substituted          Interval History (Subjective):      Recognizes family                   Physical Exam:      Last Vital Signs:  /73 mmHg  Pulse 64  Temp(Src) 98.8  F (37.1  C) (Axillary)  Resp 15  Ht 1.803 m (5' 11\")  Wt 85.9 kg (189 lb 6 oz)  BMI 26.42 kg/m2  SpO2 98%      Constitutional: Sedated and remains intubated ( now on precedex and prn versed)   Respiratory: Clear to auscultation bilaterally, no rales or wheezing  Scattered  Rhonchi  CMV FIO2 55% rate 12/min  peep 8cm   Cardiovascular: Regular rate and rhythm, normal S1 and S2, and no murmur noted   Abdomen: Normal bowel sounds, soft, non-distended, non-tender   Skin: No rashes, no cyanosis, dry to touch   Neuro:  no focal weakness when sedation lifted,   Extremities: No edema,    Other(s): No fevers       All other systems: Negative          Medications:    "   All current medications were reviewed with changes reflected in problem list.         Data:      All new lab and imaging data was reviewed.   Labs: Na 137 K 4.1  Bicarb 29 creat 7.76 glucose 272 wbc 9.3 hemoglobin 7.9  Triglycerides 1297  Imaging: CT 1/6 showed bilat consolidation   Chest xray 1/11 clear

## 2017-01-13 NOTE — PROGRESS NOTES
"Renal Medicine Note                                Dnoald Raza MRN# [id number]   Age: 66 year old YOB: 1950   Date of Admission: 1/3/2017 Hospital LOS: 10          Assessment/Plan:     Admitted with CP.  Developed respiratory failure secondary to pneumonia      1.  Respiratory Failure:  Due to pneumonia.  Cx unrevealing so far.  On broad spectrum abx.      2.  Septic Shock:  Also due to pneumonia.  Stabilizing.    3.  HIV:  On HAART.    4. ESRD: HD yesterday.  2 L removed.  K normal.  Volume status looks OK today.    5.  Anemia: MIHAI        Continue with current dialysis schedule  Next 01/14/16      Interval History:     Remains intubated but more alert.  Follows.  Family in room.    3 liter UF yesterday      ROS     Intubated and sedated: ROS unable    VITALS:     Vitals were reviewed  Patient Vitals for the past 8 hrs:   BP Temp Temp src Heart Rate Resp SpO2   01/13/17 1148 169/69 mmHg - - 76 - 97 %   01/13/17 0800 144/54 mmHg - - 76 21 92 %   01/13/17 0732 154/63 mmHg - - 66 - 96 %   01/13/17 0721 - 98.1  F (36.7  C) Axillary - - -     I/O last 3 completed shifts:  In: 2168.65 [I.V.:848.65; NG/GT:360]  Out: 3000 [Other:3000]    Filed Vitals:    01/10/17 0000 01/10/17 0730 01/11/17 0400 01/12/17 0500   Weight: 87.8 kg (193 lb 9 oz) 87.8 kg (193 lb 9 oz) 84.6 kg (186 lb 8.2 oz) 85.9 kg (189 lb 6 oz)    01/12/17 0730   Weight: 85.9 kg (189 lb 6 oz)     Medications and Allergies:     Reviewed      Physical Exam:     Seen and examined during course of dialysis run    /69 mmHg  Pulse 64  Temp(Src) 98.1  F (36.7  C) (Axillary)  Resp 21  Ht 1.803 m (5' 11\")  Wt 85.9 kg (189 lb 6 oz)  BMI 26.42 kg/m2  SpO2 97%    GENERAL: intubated  HEENT: ETT  RESP: coarse  CV: RRR, normal S1 S2  ABDOMEN: S/NT, BS present  MS: no edema  EXT: access canulated without issue    Data:         Recent Labs  Lab 01/12/17  0526      POTASSIUM 4.1   CHLORIDE 94   CO2 29   ANIONGAP 14   *   BUN 65*   CR " 7.76*   GFRESTIMATED 7*   GFRESTBLACK 8*   PRABHA 9.2       Recent Labs  Lab 01/12/17  0526 01/11/17  0446 01/10/17  0345 01/09/17  0544  01/08/17  0636   PHOS  --   --   --   --   --  5.1*   HGB 7.9* 8.3* 6.4* 6.5*  < > 6.9*   < > = values in this interval not displayed.      YOVANI Fuller    [facility] - Nephrology  [phone]

## 2017-01-14 LAB
GLUCOSE BLDC GLUCOMTR-MCNC: 103 MG/DL (ref 70–99)
GLUCOSE BLDC GLUCOMTR-MCNC: 105 MG/DL (ref 70–99)
GLUCOSE BLDC GLUCOMTR-MCNC: 109 MG/DL (ref 70–99)
GLUCOSE BLDC GLUCOMTR-MCNC: 115 MG/DL (ref 70–99)
GLUCOSE BLDC GLUCOMTR-MCNC: 117 MG/DL (ref 70–99)
GLUCOSE BLDC GLUCOMTR-MCNC: 124 MG/DL (ref 70–99)
GLUCOSE BLDC GLUCOMTR-MCNC: 171 MG/DL (ref 70–99)
GLUCOSE BLDC GLUCOMTR-MCNC: 200 MG/DL (ref 70–99)
GLUCOSE BLDC GLUCOMTR-MCNC: 202 MG/DL (ref 70–99)
GLUCOSE BLDC GLUCOMTR-MCNC: 204 MG/DL (ref 70–99)
GLUCOSE BLDC GLUCOMTR-MCNC: 223 MG/DL (ref 70–99)
GLUCOSE BLDC GLUCOMTR-MCNC: 225 MG/DL (ref 70–99)
GLUCOSE BLDC GLUCOMTR-MCNC: 236 MG/DL (ref 70–99)
GLUCOSE BLDC GLUCOMTR-MCNC: 267 MG/DL (ref 70–99)
GLUCOSE BLDC GLUCOMTR-MCNC: 279 MG/DL (ref 70–99)
GLUCOSE BLDC GLUCOMTR-MCNC: 288 MG/DL (ref 70–99)
GLUCOSE BLDC GLUCOMTR-MCNC: 308 MG/DL (ref 70–99)
VANCOMYCIN SERPL-MCNC: 26.3 MG/L

## 2017-01-14 PROCEDURE — 25000308 HC RX OP HPI UCR WEL MED 250 IP 250: Performed by: INTERNAL MEDICINE

## 2017-01-14 PROCEDURE — 25000132 ZZH RX MED GY IP 250 OP 250 PS 637: Mod: GY | Performed by: INTERNAL MEDICINE

## 2017-01-14 PROCEDURE — 25000128 H RX IP 250 OP 636: Performed by: INTERNAL MEDICINE

## 2017-01-14 PROCEDURE — 94640 AIRWAY INHALATION TREATMENT: CPT | Mod: 76

## 2017-01-14 PROCEDURE — 25000132 ZZH RX MED GY IP 250 OP 250 PS 637: Mod: GY

## 2017-01-14 PROCEDURE — A9270 NON-COVERED ITEM OR SERVICE: HCPCS | Mod: GY | Performed by: INTERNAL MEDICINE

## 2017-01-14 PROCEDURE — 94640 AIRWAY INHALATION TREATMENT: CPT

## 2017-01-14 PROCEDURE — 63400005 ZZH RX 634: Performed by: INTERNAL MEDICINE

## 2017-01-14 PROCEDURE — 25000125 ZZHC RX 250: Performed by: INTERNAL MEDICINE

## 2017-01-14 PROCEDURE — 80202 ASSAY OF VANCOMYCIN: CPT

## 2017-01-14 PROCEDURE — 99232 SBSQ HOSP IP/OBS MODERATE 35: CPT | Performed by: INTERNAL MEDICINE

## 2017-01-14 PROCEDURE — 25000308 HC RX OP HPI UCR WEL MED 250 IP 250: Performed by: SURGERY

## 2017-01-14 PROCEDURE — 00000146 ZZHCL STATISTIC GLUCOSE BY METER IP

## 2017-01-14 PROCEDURE — 25000125 ZZHC RX 250

## 2017-01-14 PROCEDURE — 25000125 ZZHC RX 250: Performed by: SURGERY

## 2017-01-14 PROCEDURE — 20000003 ZZH R&B ICU

## 2017-01-14 PROCEDURE — 94799 UNLISTED PULMONARY SVC/PX: CPT

## 2017-01-14 PROCEDURE — 99291 CRITICAL CARE FIRST HOUR: CPT | Performed by: INTERNAL MEDICINE

## 2017-01-14 PROCEDURE — 94003 VENT MGMT INPAT SUBQ DAY: CPT

## 2017-01-14 PROCEDURE — 40000809 ZZH STATISTIC NO DOCUMENTATION TO SUPPORT CHARGE

## 2017-01-14 PROCEDURE — 90937 HEMODIALYSIS REPEATED EVAL: CPT

## 2017-01-14 PROCEDURE — 27210432 ZZH NUTRITION PRODUCT RENAL BASIC LITER

## 2017-01-14 PROCEDURE — 25000128 H RX IP 250 OP 636

## 2017-01-14 PROCEDURE — 40000275 ZZH STATISTIC RCP TIME EA 10 MIN

## 2017-01-14 RX ORDER — ALBUTEROL SULFATE 0.83 MG/ML
2.5 SOLUTION RESPIRATORY (INHALATION) EVERY 6 HOURS
Status: DISCONTINUED | OUTPATIENT
Start: 2017-01-14 | End: 2017-01-14

## 2017-01-14 RX ORDER — NICOTINE POLACRILEX 4 MG
15-30 LOZENGE BUCCAL
Status: DISCONTINUED | OUTPATIENT
Start: 2017-01-14 | End: 2017-01-14

## 2017-01-14 RX ORDER — PANTOPRAZOLE SODIUM 40 MG/1
40 TABLET, DELAYED RELEASE ORAL DAILY
Status: DISCONTINUED | OUTPATIENT
Start: 2017-01-15 | End: 2017-01-15 | Stop reason: ALTCHOICE

## 2017-01-14 RX ORDER — ASPIRIN 81 MG/1
81 TABLET, CHEWABLE ORAL EVERY EVENING
Status: DISCONTINUED | OUTPATIENT
Start: 2017-01-14 | End: 2017-01-22 | Stop reason: HOSPADM

## 2017-01-14 RX ORDER — MAGNESIUM OXIDE 400 MG/1
400 TABLET ORAL AT BEDTIME
Status: DISCONTINUED | OUTPATIENT
Start: 2017-01-14 | End: 2017-01-22 | Stop reason: HOSPADM

## 2017-01-14 RX ORDER — ALBUTEROL SULFATE 0.83 MG/ML
2.5 SOLUTION RESPIRATORY (INHALATION)
Status: DISCONTINUED | OUTPATIENT
Start: 2017-01-15 | End: 2017-01-15

## 2017-01-14 RX ORDER — DEXTROSE MONOHYDRATE 25 G/50ML
25-50 INJECTION, SOLUTION INTRAVENOUS
Status: DISCONTINUED | OUTPATIENT
Start: 2017-01-14 | End: 2017-01-14

## 2017-01-14 RX ORDER — GABAPENTIN 300 MG/1
600 CAPSULE ORAL EVERY EVENING
Status: DISCONTINUED | OUTPATIENT
Start: 2017-01-14 | End: 2017-01-15 | Stop reason: ALTCHOICE

## 2017-01-14 RX ORDER — GABAPENTIN 300 MG/1
300 CAPSULE ORAL EVERY MORNING
Status: DISCONTINUED | OUTPATIENT
Start: 2017-01-15 | End: 2017-01-15 | Stop reason: ALTCHOICE

## 2017-01-14 RX ORDER — CARVEDILOL 25 MG/1
25 TABLET ORAL 2 TIMES DAILY WITH MEALS
Status: DISCONTINUED | OUTPATIENT
Start: 2017-01-14 | End: 2017-01-22 | Stop reason: HOSPADM

## 2017-01-14 RX ORDER — MYCOPHENOLATE MOFETIL 250 MG/1
500 CAPSULE ORAL 2 TIMES DAILY
Status: DISCONTINUED | OUTPATIENT
Start: 2017-01-14 | End: 2017-01-22 | Stop reason: HOSPADM

## 2017-01-14 RX ORDER — DAPSONE 25 MG/1
100 TABLET ORAL AT BEDTIME
Status: DISCONTINUED | OUTPATIENT
Start: 2017-01-14 | End: 2017-01-22 | Stop reason: HOSPADM

## 2017-01-14 RX ORDER — LOSARTAN POTASSIUM 100 MG/1
100 TABLET ORAL AT BEDTIME
Status: DISCONTINUED | OUTPATIENT
Start: 2017-01-14 | End: 2017-01-22 | Stop reason: HOSPADM

## 2017-01-14 RX ADMIN — DEXTRAN 70, AND HYPROMELLOSE 2910 2 DROP: 1; 3 SOLUTION/ DROPS OPHTHALMIC at 21:17

## 2017-01-14 RX ADMIN — TAZOBACTAM SODIUM AND PIPERACILLIN SODIUM 2.25 G: 250; 2 INJECTION, SOLUTION INTRAVENOUS at 12:39

## 2017-01-14 RX ADMIN — HYDROMORPHONE HYDROCHLORIDE 0.5 MG: 10 INJECTION, SOLUTION INTRAMUSCULAR; INTRAVENOUS; SUBCUTANEOUS at 13:26

## 2017-01-14 RX ADMIN — ALBUTEROL SULFATE 2.5 MG: 2.5 SOLUTION RESPIRATORY (INHALATION) at 11:45

## 2017-01-14 RX ADMIN — CALCIUM ACETATE 2001 MG: 667 CAPSULE ORAL at 11:37

## 2017-01-14 RX ADMIN — SODIUM CHLORIDE SOLN NEBU 3% 4 ML: 3 NEBU SOLN at 03:09

## 2017-01-14 RX ADMIN — ALBUTEROL SULFATE 2.5 MG: 2.5 SOLUTION RESPIRATORY (INHALATION) at 00:28

## 2017-01-14 RX ADMIN — CHLORHEXIDINE GLUCONATE 15 ML: 1.2 RINSE ORAL at 22:16

## 2017-01-14 RX ADMIN — ALBUTEROL SULFATE 2.5 MG: 2.5 SOLUTION RESPIRATORY (INHALATION) at 08:22

## 2017-01-14 RX ADMIN — Medication 1 PACKET: at 11:30

## 2017-01-14 RX ADMIN — INSULIN GLARGINE 26 UNITS: 100 INJECTION, SOLUTION SUBCUTANEOUS at 11:44

## 2017-01-14 RX ADMIN — CARVEDILOL 25 MG: 25 TABLET, FILM COATED ORAL at 11:53

## 2017-01-14 RX ADMIN — HEPARIN SODIUM 5000 UNITS: 5000 INJECTION, SOLUTION INTRAVENOUS; SUBCUTANEOUS at 11:42

## 2017-01-14 RX ADMIN — ALBUTEROL SULFATE 2.5 MG: 2.5 SOLUTION RESPIRATORY (INHALATION) at 03:09

## 2017-01-14 RX ADMIN — VANCOMYCIN HYDROCHLORIDE 750 MG: 10 INJECTION, POWDER, LYOPHILIZED, FOR SOLUTION INTRAVENOUS at 11:59

## 2017-01-14 RX ADMIN — TAZOBACTAM SODIUM AND PIPERACILLIN SODIUM 2.25 G: 250; 2 INJECTION, SOLUTION INTRAVENOUS at 06:47

## 2017-01-14 RX ADMIN — INSULIN GLARGINE 26 UNITS: 100 INJECTION, SOLUTION SUBCUTANEOUS at 21:18

## 2017-01-14 RX ADMIN — HYDROMORPHONE HYDROCHLORIDE 0.5 MG: 10 INJECTION, SOLUTION INTRAMUSCULAR; INTRAVENOUS; SUBCUTANEOUS at 20:19

## 2017-01-14 RX ADMIN — SODIUM CHLORIDE SOLN NEBU 3% 4 ML: 3 NEBU SOLN at 15:38

## 2017-01-14 RX ADMIN — Medication 40 MG: at 11:35

## 2017-01-14 RX ADMIN — SODIUM CHLORIDE SOLN NEBU 3% 4 ML: 3 NEBU SOLN at 00:28

## 2017-01-14 RX ADMIN — TAZOBACTAM SODIUM AND PIPERACILLIN SODIUM 2.25 G: 250; 2 INJECTION, SOLUTION INTRAVENOUS at 18:01

## 2017-01-14 RX ADMIN — ALBUTEROL SULFATE 2.5 MG: 2.5 SOLUTION RESPIRATORY (INHALATION) at 20:01

## 2017-01-14 RX ADMIN — SODIUM CHLORIDE SOLN NEBU 3% 4 ML: 3 NEBU SOLN at 20:01

## 2017-01-14 RX ADMIN — GABAPENTIN 300 MG: 250 SUSPENSION ORAL at 11:34

## 2017-01-14 RX ADMIN — Medication 2 UNITS/HR: at 09:19

## 2017-01-14 RX ADMIN — Medication 5 ML: at 11:33

## 2017-01-14 RX ADMIN — ALBUTEROL SULFATE 2.5 MG: 2.5 SOLUTION RESPIRATORY (INHALATION) at 15:38

## 2017-01-14 RX ADMIN — DEXMEDETOMIDINE 0.5 MCG/KG/HR: 100 INJECTION, SOLUTION, CONCENTRATE INTRAVENOUS at 08:26

## 2017-01-14 RX ADMIN — LEVOFLOXACIN 500 MG: 5 INJECTION, SOLUTION INTRAVENOUS at 20:22

## 2017-01-14 RX ADMIN — HYDRALAZINE HYDROCHLORIDE 10 MG: 20 INJECTION INTRAMUSCULAR; INTRAVENOUS at 05:07

## 2017-01-14 RX ADMIN — SODIUM CHLORIDE SOLN NEBU 3% 4 ML: 3 NEBU SOLN at 11:47

## 2017-01-14 RX ADMIN — CHLORHEXIDINE GLUCONATE 15 ML: 1.2 RINSE ORAL at 11:45

## 2017-01-14 RX ADMIN — EPOETIN ALFA 8000 UNITS: 4000 SOLUTION INTRAVENOUS; SUBCUTANEOUS at 07:45

## 2017-01-14 RX ADMIN — SODIUM CHLORIDE SOLN NEBU 3% 4 ML: 3 NEBU SOLN at 08:22

## 2017-01-14 RX ADMIN — TAZOBACTAM SODIUM AND PIPERACILLIN SODIUM 2.25 G: 250; 2 INJECTION, SOLUTION INTRAVENOUS at 00:42

## 2017-01-14 RX ADMIN — ALBUTEROL SULFATE 2.5 MG: 2.5 SOLUTION RESPIRATORY (INHALATION) at 23:34

## 2017-01-14 RX ADMIN — MYCOPHENOLATE MOFETIL 500 MG: 200 POWDER, FOR SUSPENSION ORAL at 11:41

## 2017-01-14 RX ADMIN — HYDRALAZINE HYDROCHLORIDE 10 MG: 20 INJECTION INTRAMUSCULAR; INTRAVENOUS at 00:42

## 2017-01-14 RX ADMIN — HYDROMORPHONE HYDROCHLORIDE 0.5 MG: 10 INJECTION, SOLUTION INTRAMUSCULAR; INTRAVENOUS; SUBCUTANEOUS at 22:14

## 2017-01-14 RX ADMIN — HEPARIN SODIUM 5000 UNITS: 5000 INJECTION, SOLUTION INTRAVENOUS; SUBCUTANEOUS at 20:22

## 2017-01-14 RX ADMIN — Medication 5.5 UNITS/HR: at 16:01

## 2017-01-14 RX ADMIN — SODIUM CHLORIDE SOLN NEBU 3% 4 ML: 3 NEBU SOLN at 23:34

## 2017-01-14 RX ADMIN — SODIUM CHLORIDE, PRESERVATIVE FREE 250 ML: 5 INJECTION INTRAVENOUS at 06:47

## 2017-01-14 ASSESSMENT — PAIN DESCRIPTION - DESCRIPTORS
DESCRIPTORS: PATIENT UNABLE TO DESCRIBE
DESCRIPTORS: ACHING

## 2017-01-14 NOTE — PROGRESS NOTES
"Received PT on the following ventilator settings with no changes this shift:  Ventilation Mode: CMV/AC  FiO2 (%): 55 %  Rate Set (breaths/minute): 12 breaths/min  Tidal Volume Set (mL): 500 mL  PEEP (cm H2O): 8 cmH2O  Oxygen Concentration (%): 55 %  Resp: 15    Vital signs:  Temp: 98  F (36.7  C) Temp src: Axillary BP: 168/73 mmHg   Heart Rate: 67 Resp: 15 SpO2: 95 % O2 Device: Mechanical Ventilator Oxygen Delivery: 8 LPM Height: 180.3 cm (5' 11\") Weight: 85.9 kg (189 lb 6 oz)  Estimated body mass index is 26.42 kg/(m^2) as calculated from the following:    Height as of this encounter: 1.803 m (5' 11\").    Weight as of this encounter: 85.9 kg (189 lb 6 oz).      Recent Labs  Lab 01/08/17  1450   O2PER 45%Ventricle       BS coarse.  Suctioned moderate to large amounts of thick, cloudy secretions from ETT lavaging with saline. PT has strong cough reflex when suctioning. Hypertonic NaCl + Albuterol nebs given in-line with the vent circuit Q4 intervals   RT will continue to monitor.    Alexander Dinh, RT  January 14, 2017 5:51 AM          "

## 2017-01-14 NOTE — PHARMACY-VANCOMYCIN DOSING SERVICE
Vanco level 26.3 (prior to dialysis)  Will redose with vanco 750mg iv x1dose today.  Will r/a vanco level on Tues am, next dialysis on 1/17

## 2017-01-14 NOTE — PROGRESS NOTES
"[facility]  Hospitalist Progress Note  Susan Farah MD 01/14/2017    Reason for Stay (Diagnosis): presented with chest pain then became septic and hypoxic         Assessment and Plan:      Summary of Stay: Donald Raza is a 66 year old male admitted on 1/3/2017    Problem List:   1. Presented with chest pain then became septic ( needed vasopressors) and hypoxic  2. Bilateral basilar pneumonia  3. Acute hypoxic respiratory failure- intubated 1/7 and failed extubation 1/9  4. ESRD  On dialysis  5. Progressive anemia s/p transfusion-s/p 2 units 1/10  6. HIV on antivirals  non-detectable viral load  7. Stable CAD  8. Hypertension and diabetes  9. Extubated today 1/13 without difficulty    PLAN  Tolerated pressure support with normal respiratory rate and adequate volumes   no longer having copious secretions  Gave the thumbs up sign  Also had good dialysis run today  insulin dosing changed to IV infusion yesterday because of marked hyperglycemia  Continue levaquin and zosyn  Up to chair and PT  May start clear liquids in 4 hours          Interval History (Subjective):      Able to be extubated today and is comfortable                   Physical Exam:      Last Vital Signs:  /70 mmHg  Pulse 64  Temp(Src) 97.3  F (36.3  C) (Axillary)  Resp 18  Ht 1.803 m (5' 11\")  Wt 85.8 kg (189 lb 2.5 oz)  BMI 26.39 kg/m2  SpO2 96%      Constitutional: Alert oriented appropriate responses   Respiratory: Clear to auscultation bilaterally, no rales or wheezing  Some upper airway secretions with extubation  Face mask oxymizer 40%   Cardiovascular: Regular rate and rhythm, normal S1 and S2, and no murmur noted   Abdomen: Normal bowel sounds, soft, non-distended, non-tender   Skin: No rashes, no cyanosis, dry to touch   Neuro:  no focal weakness, oriented to self and events and date   Extremities: No edema,    Other(s): No fevers    No pain   All other systems: Negative          Medications:      All " current medications were reviewed with changes reflected in problem list.         Data:      All new lab and imaging data was reviewed.   Labs: Na 137 K 4.1  Bicarb 29 creat 7.76 glucose 272 wbc 9.3 hemoglobin 7.9  Triglycerides 1297  Imaging: CT 1/6 showed bilat consolidation   Chest xray 1/11 clear

## 2017-01-14 NOTE — PLAN OF CARE
RT note-  Received patient on adult volume vent settings=   RR 12, +8 peep  1145 wean=  Ventilation Mode: CPAP/PS  FiO2 (%): 55 %  Rate Set (breaths/minute): 12 breaths/min  Tidal Volume Set (mL): 500 mL  PEEP (cm H2O): 5 cmH2O  Pressure Support (cm H2O): 8 cmH2O  Oxygen Concentration (%): 55 %  Resp: 18   Patient was placed on wean 8/5 1145,patient tolerated well. Patient was  extubated at 1330 without incident. Patient was placed on oxymask 10LPM.Tolerated well. Observed SPO2 99%, BS-diminished, coarse.Fair congested cough. Liter flow titrated to 9lpm  Nebs given as per order.  RT will continue to follow

## 2017-01-14 NOTE — PROGRESS NOTES
Renal Medicine Progress Note    SHORTHAND KEY FOR MY NOTES:  c = with, s = without, p = after, a = before, x = except, asx = asymptomatic, tx = transplant or treatment, sx = symptoms or symptomatic, cx = canceled or culture, rxn = reaction, yday = yesterday, nl = normal, abx = antibiotics, fxn = function, dx = diagnosis, dz = disease, m/h = melena/hematochezia, c/d/l/ha = cramping/dizziness/lightheadedness/headache, d/c = discharge or diarrhea/constipation, f/c/n/v = fevers/chills/nausea/vomiting, cp/sob = chest pain/shortness of breath.         Assessment/Plan:     1.  ESKD.  Pt dialysing s probs.  No issues c the run.  A.  Next run planned on Monday.    2.  Resp failure 2 pneumonia.  Pt is oxygenating ok on the vent.    A.  Per ICU team.    3.  Sepsis syndrome 2 pneumonia.  Stable.  Not on pressors.  On abx.  A.  Continue abx at proper renal doses.        Interval History:     Unable.  Pt is intubated/sedated.          Medications and Allergies:       epoetin brittni (EPOGEN,PROCRIT) inj ESRD  8,000 Units Intravenous See Admin Instructions     insulin glargine  26 Units Subcutaneous BID     mycophenolate  500 mg Per Feeding Tube BID     losartan  100 mg Per Feeding Tube At Bedtime     carvedilol  25 mg Per Feeding Tube BID w/meals     sodium chloride  4 mL Nebulization Q4H     pantoprazole  40 mg Per Feeding Tube Daily     heparin  5,000 Units Subcutaneous BID     B and C vitamin Complex with folic acid  5 mL Per Feeding Tube Daily     protein modular  1 packet Per Feeding Tube TID     aspirin chewable tablet 81 mg  81 mg Per Feeding Tube QPM     dapsone  100 mg Per Feeding Tube At Bedtime     gabapentin  300 mg Per Feeding Tube QAM     gabapentin  600 mg Per Feeding Tube QPM     magnesium oxide (MAG-OX) tablet 400 mg  400 mg Per Feeding Tube At Bedtime     albuterol  2.5 mg Nebulization Q4H     sodium chloride (PF)  10 mL Intracatheter Q7 Days     piperacillin-tazobactam  2.25 g Intravenous Q6H     vancomycin place  "koenig - receiving intermittent dosing  1 each Does not apply See Admin Instructions     levofloxacin  500 mg Intravenous Q48H     - MEDICATION INSTRUCTIONS for Dialysis Patients -   Does not apply See Admin Instructions     iopamidol  100 mL Intravenous Once     sodium chloride (PF)  3 mL Intracatheter Q8H     abacavir  600 mg Oral QPM     calcium acetate  2,001 mg Oral TID w/meals     chlorhexidine  15 mL Swish & Spit BID     clopidogrel (PLAVIX) tablet 75 mg  75 mg Oral QPM     darunavir  800 mg Oral At Bedtime     dolutegravir  50 mg Oral At Bedtime     ritonavir  100 mg Oral At Bedtime      Allergies   Allergen Reactions     Lisinopril      Sulfa Drugs           Physical Exam:     Vitals were reviewed  Heart Rate: 68, Blood pressure 98/54, pulse 64, temperature 98  F (36.7  C), temperature source Axillary, resp. rate 24, height 1.803 m (5' 11\"), weight 85.8 kg (189 lb 2.5 oz), SpO2 98 %.    Wt Readings from Last 3 Encounters:   01/14/17 85.8 kg (189 lb 2.5 oz)   11/28/16 88.905 kg (196 lb)   11/28/16 86.183 kg (190 lb)     Intake/Output Summary (Last 24 hours) at 01/14/17 1039  Last data filed at 01/14/17 0900   Gross per 24 hour   Intake 1708.16 ml   Output      0 ml   Net 1708.16 ml     GENERAL APPEARANCE: intubated, sedated, eyes open to name  HEENT:  Eyes/ears/nose/neck grossly normal  RESP: + coarse vent sounds, rhonchi  CV: RRR, nl S1/S2   ABDOMEN: o/s/nt/nd  EXTREMITIES/SKIN: no ble edema  NEURO:  sedated    Pt seen on HD.  Stable run.  -3L uf goal.  Good BFR via LFAF.         Data:     CBC RESULTS:     Recent Labs  Lab 01/12/17  0526 01/11/17  0446 01/10/17  0345 01/09/17  0544 01/08/17  1415 01/08/17  0636 01/07/17  2105   WBC 9.3 8.6 5.3 5.3  --  4.7 3.9*   RBC 2.70* 2.87* 2.27* 2.33*  --  2.41* 2.10*   HGB 7.9* 8.3* 6.4* 6.5* 6.9* 6.9* 6.0*   HCT 24.6* 25.8* 20.5* 21.0*  --  21.6* 18.9*    138* 127* 112*  --  93* 86*     Basic Metabolic Panel:    Recent Labs  Lab 01/12/17  0526 01/11/17  0446 " 01/10/17  0345 01/09/17  0544 01/08/17  0636 01/07/17  1411    137 136 137 136  --    POTASSIUM 4.1 4.0 4.4 4.1 4.5 3.8   CHLORIDE 94 96 97 98 97  --    CO2 29 29 26 27 27  --    BUN 65* 42* 57* 48* 37*  --    CR 7.76* 6.05* 8.87* 7.42* 5.64*  --    * 211* 104* 107* 162*  --    PRABHA 9.2 9.0 8.9 8.1* 7.9*  --      INR  Recent Labs  Lab 01/08/17  0636   INR 1.41*      Attestation:   I have reviewed today's relevant vital signs, notes, medications, labs and imaging.    Bipin Chaves MD  [facility] - Nephrology  [phone]

## 2017-01-14 NOTE — PROGRESS NOTES
POTASSIUM         Date       Value       Ref Range       Status         01/14/2017       4.1mmol/L     3.4 - 5.3 mmol/L       Final           HGB    7.9g/dl    1/14/2017  Weight: 85.9 kg  1/14/2017      Patient dialyzed for 3.5 hrs. on a 3 K bath with a net fluid removal of 3L.  A BFR of 450 ml/min was obtained via a left upper arm AV fistula using 15 gauge needles.         Total heparin received during the treatment: 0 units. Sites held x 12 min then drsgs applied.  Meds  Given: Epogen, 8000u. Complications: none.  Procedure and ESRD teaching done.  See flowsheet in EPIC for further details and post assessment.     Machine water alarm in place and functioning.  Consent for dialysis signed.  1/7/2017  Vascular Access: Aseptic prep done for both on/off.  HEPATITIS B SURFACE ANTIGEN: negative DATE: 12/24/16  HEPATITIS B SURFACE ANTIBODY: unknown  Chlorine/Chloramine water system checked every 4 hours.  Report given to:Whitney Yuan RN and JERSEY Grayson RN  [facility]

## 2017-01-14 NOTE — PROGRESS NOTES
[facility] Intensive Care Progress Note  Problem list  End-stage renal disease  HIV  Respiratory failure due to pneumonia  Type 2 diabetes  Chronic anemia    Date of Service (when I saw the patient): 01/14/2017     Assessment and Plan  Donald Raza is a 66 year old male with complex medical hx of HAART for hx of HIV, ESRD on HD, CAD, Hypertension, DM type 2 on insulin, chronic anemia  admitted on 1/3/2017 with episodes of chest pain and was determined non cardiac in etiology given recent heart cath findings.  Failed extubation on 1/9, self extubated on 1/10 and was reintubated      Neurology: arousable, appropriate  Rass goal -2-3  Plan: Propofol discontinued due to hypertriglyceridemia, switch to Precedex, p.r.n. fentanyl and Versed    Cardiovascular:  Septic shock requiring vasopressor, resolved. H/o CAD and multiple PCI. ECG unremarkable. He was started on echo showed LVH and left atrial dilatation  Plan:    Maintain normotension and euvolemia    Pulmonary:        Hypoxemic respiratory failure requiring intubation on 1/7 most likely sec to PNA: febrile, bilateral LL infiltrates.  Failed self extubation 1/10, due to secretions it sounds like; still with secretions suctioning  Plan: Continue broad spectrum ABX. Until all cultures available.   Nebulized saline solution to help clear secretions  Improved today, consider extubation    Ventilation Mode: CMV/AC  FiO2 (%): 55 %  Rate Set (breaths/minute): 12 breaths/min  Tidal Volume Set (mL): 500 mL  PEEP (cm H2O): 8 cmH2O  Oxygen Concentration (%): 55 %  Resp: 24    Renal  Hyperkalemia on admission resolved. H/o ESRD on HD, T/R/S   Plan: Monitor.     ID:         Sepsis, probable pneumonia: HIV on antiretroviral therapy. Temp improved, cultures NGTD; Agree with ID rationale, PJP unlikely. Bronchoscopy can be performed but would probably not be helpful for bacterial etiology given time of antibiotics  Plan: Continue broad spectrum  antibiotics (started 1/6) as clinically improved, no culture data to guide narrow  Probably stop tomorrow, will await ID input    GI  No concerns.    Nutrition  Malnutrition.  Plan: nutrition consult for TF    Endocrine:  H/o DM type II.   Plan: Monitor and treat hyperglycemia.      Heme/Onc:  Acute on chronic anemia. Thrombocytopenia. No clear source of bleeding.2 unit PRBC for hgb 5,4 and then 61. on 1/7/17 .   Plan: Hgb 8.3 today, transfused overnight   Reduce blood draws except for before dialysis    DVT Prophylaxis: Pneumatic Compression Devices  GI Prophylaxis: PPI    Restraints: Restraints for medical healing needed: YES    Family update by me today: no.    Georgie Crump    Time Spent on This Encounter  Billing:  I spent 35 minutes bedside and on the inpatient unit today managing the critical care of Donald Raza in relation to the issues listed in this note.    Main Plans for Today  Daily sedation holiday  Spontaneous breathing trial today, consider extubation today    Physical Exam  Temp: 97.3  F (36.3  C) Temp src: Axillary Temp  Min: 97.3  F (36.3  C)  Max: 98.8  F (37.1  C) BP: (!) 99/33 mmHg   Heart Rate: 68 Resp: 24 SpO2: 98 % O2 Device: Mechanical Ventilator    Filed Vitals:    01/12/17 0500 01/12/17 0730 01/14/17 0645   Weight: 85.9 kg (189 lb 6 oz) 85.9 kg (189 lb 6 oz) 85.8 kg (189 lb 2.5 oz)     I/O last 3 completed shifts:  In: 1798.16 [I.V.:258.16; NG/GT:580]  Out: -     Neurologic: Arousable, grossly non-focal.   Cardiovascular: RRR, no murmurs and gallops, pulses present all 4 extremities.    Respiratory: breath sounds course bl, decrease bl LL.  GI: soft, non-tender, non-distended.    Skin/Extremities: pale, no deformities or edema.    Lines: No erythema or discharge at entry site for PICC Line  Current lines are required for patient management    Medications    insulin (regular) 2 Units/hr (01/14/17 1107)     dexmedetomidine 0.5 mcg/kg/hr (01/14/17 0928)     NaCl 10 mL/hr at 01/13/17  1853     IV fluid REPLACEMENT ONLY       NaCl 10 mL/hr at 01/13/17 1900       insulin glargine  26 Units Subcutaneous BID     vancomycin (VANCOCIN) IV  750 mg Intravenous Once     mycophenolate  500 mg Per Feeding Tube BID     losartan  100 mg Per Feeding Tube At Bedtime     carvedilol  25 mg Per Feeding Tube BID w/meals     sodium chloride  4 mL Nebulization Q4H     pantoprazole  40 mg Per Feeding Tube Daily     heparin  5,000 Units Subcutaneous BID     B and C vitamin Complex with folic acid  5 mL Per Feeding Tube Daily     protein modular  1 packet Per Feeding Tube TID     aspirin chewable tablet 81 mg  81 mg Per Feeding Tube QPM     dapsone  100 mg Per Feeding Tube At Bedtime     gabapentin  300 mg Per Feeding Tube QAM     gabapentin  600 mg Per Feeding Tube QPM     magnesium oxide (MAG-OX) tablet 400 mg  400 mg Per Feeding Tube At Bedtime     albuterol  2.5 mg Nebulization Q4H     sodium chloride (PF)  10 mL Intracatheter Q7 Days     piperacillin-tazobactam  2.25 g Intravenous Q6H     vancomycin place koenig - receiving intermittent dosing  1 each Does not apply See Admin Instructions     levofloxacin  500 mg Intravenous Q48H     - MEDICATION INSTRUCTIONS for Dialysis Patients -   Does not apply See Admin Instructions     iopamidol  100 mL Intravenous Once     sodium chloride (PF)  3 mL Intracatheter Q8H     abacavir  600 mg Oral QPM     calcium acetate  2,001 mg Oral TID w/meals     chlorhexidine  15 mL Swish & Spit BID     clopidogrel (PLAVIX) tablet 75 mg  75 mg Oral QPM     darunavir  800 mg Oral At Bedtime     dolutegravir  50 mg Oral At Bedtime     ritonavir  100 mg Oral At Bedtime       Data    Recent Labs  Lab 01/12/17  0526 01/11/17  0446 01/10/17  0345 01/09/17  0544  01/08/17  0636   WBC 9.3 8.6 5.3 5.3  --  4.7   HGB 7.9* 8.3* 6.4* 6.5*  < > 6.9*   MCV 91 90 90 90  --  90    138* 127* 112*  --  93*   INR  --   --   --   --   --  1.41*    137 136 137  --  136   POTASSIUM 4.1 4.0 4.4 4.1   --  4.5   CHLORIDE 94 96 97 98  --  97   CO2 29 29 26 27  --  27   BUN 65* 42* 57* 48*  --  37*   CR 7.76* 6.05* 8.87* 7.42*  --  5.64*   ANIONGAP 14 12 13 12  --  12   PRABHA 9.2 9.0 8.9 8.1*  --  7.9*   * 211* 104* 107*  --  162*   ALBUMIN 2.2*  --   --  2.2*  --  2.3*   PROTTOTAL 6.8  --   --  6.0*  --  6.0*   BILITOTAL 0.8  --   --  0.7  --  0.8   ALKPHOS 88  --   --  50  --  52   *  --   --  264*  --  376*   AST 66*  --   --  224*  --  619*   < > = values in this interval not displayed.  No results found for this or any previous visit (from the past 24 hour(s)).   Georgie Crump

## 2017-01-14 NOTE — PROGRESS NOTES
" INFECTIOUS DISEASE Progress Note  January 14, 2017  [id number]  Donald Guevaraido    ANTIBIOTICS:  Zosyn  Vancomycin  Levofloxacin 500 mg iv q 48    SUBJECTIVE: afebrile, doing well, extubated, not much cough and nil sputum, feels well/comfortable    OBJECTIVE:  /57 mmHg  Pulse 64  Temp(Src) 98.7  F (37.1  C) (Oral)  Resp 22  Ht 1.803 m (5' 11\")  Wt 85.8 kg (189 lb 2.5 oz)  BMI 26.39 kg/m2  SpO2 99%  Alert, communicative  Lung coarse BS  L AVF ok  abd benign  1+ edema  No rash    LAB Data:    MICROBIOLOGY:  BC NG  SC NG    IMAGING:  CXR 1/11-  FINDINGS: Interval pullback of an endotracheal tube with distal tube  tip now in the trachea and approximately 1.6 cm proximal to the  jerry. An enteric drainage tube is again present with distal tube tip  in the gastric fundus. A right upper extremity peripherally inserted  central catheter is present with distal catheter tip in the superior  vena cava. Hypoinflated lungs. The lungs are otherwise clear. Possible  cardiomegaly. A vascular stent is present in the left axillary region.    Attestation:  I have reviewed today's vital signs, notes, medications, labs and imaging.    ASSESSMENT:  1. PNEUMONIA  2. RESPIRATORY FAILURE-Improved, doing well today off vent  3. HIV-VL ND,   4. ESRD      RECOMMENDATION:   1. Cont current antibiotics  2. F/u O2 needs and off vent       GEORGIE BAKER M.D.  O:[phone]   B:[phone]          "

## 2017-01-14 NOTE — PLAN OF CARE
Problem: Restraint for Non-Violent/Non-Self-Destructive Behavior  Goal: Prevent/Manage Potential Problems  Maintain safety of patient and others during period of restraint.  Promote psychological and physical wellbeing.  Prevent injury to skin and involved body parts.  Promote nutrition, hydration, and elimination.   Outcome: No Change  Soft restraints discontinued at 1:34 PM on 1/14/2017.    Restraint discontinue criteria met, patient is calm, cooperative and safe. Restraints removed.     Patient's Response: Calm and cooperative, extubated  Family Notification: Spouse/significant other, Other (spouse and daughter here and change of shift; reiterated use)  Attending Physician Notified:     Geetha Garibay

## 2017-01-14 NOTE — PROGRESS NOTES
"Pt continues to be on full ventilator support throughout the day with no changes and is currently resting comfortably on it. No PS trial was attempted due to his respiratory status and thick secretions, will continue to monitor and assess. BBS are coarse, suctioning a moderate amount of thick tan/creamy secretion.     Ventilation Mode: CMV/AC  FiO2 (%): 55 %  Rate Set (breaths/minute): 12 breaths/min  Tidal Volume Set (mL): 500 mL  PEEP (cm H2O): 8 cmH2O  Pressure Support (cm H2O): 8 cmH2O  Oxygen Concentration (%): 55 %  Resp: 17    Vital signs:  Temp: 98.8  F (37.1  C) Temp src: Oral BP: 157/68 mmHg   Heart Rate: 71 Resp: 17 SpO2: 98 % O2 Device: Mechanical Ventilator Oxygen Delivery: 8 LPM Height: 180.3 cm (5' 11\") Weight: 85.9 kg (189 lb 6 oz)  Estimated body mass index is 26.42 kg/(m^2) as calculated from the following:    Height as of this encounter: 1.803 m (5' 11\").    Weight as of this encounter: 85.9 kg (189 lb 6 oz).      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, recurrent     Hypertension 2010     CKD (chronic kidney disease)      Hemodialysis     Mixed hyperlipidemia      Coronary artery disease      stents     NSTEMI (non-ST elevated myocardial infarction) (H) 12/2015, 5/2016     Pulmonary HTN (H)      Mod     Near syncope 2016     with hemodialysis     TIA (transient ischemic attack) 5/2016     Increased prostate specific antigen (PSA) velocity 08/08/2016     Awaiting bx on blood thinner     Bilateral pneumonia 1/7/2017       PAST SURGICAL HISTORY:   Past Surgical History   Procedure " Laterality Date     Cholecystectomy, laporoscopic       Colostomy  09/30/1999     Temporary for diverticulitis     Appendectomy  2000     Angiogram  03-04-16     No culprit lesions, stents widely patent      Angiogram  05-06-16     Cutting balloon ptca=Diag     Stent, coronary, s660 15/18  12/2015     VANITA=Diag, PTCA=LAD     Stent, coronary, s660 15/18  06/2015     VANITA=LAD     Heart cath, angioplasty  08-18-16     LAD PCI. Stented with a 3.0 x 8 mm Xience Alpine stent.       FAMILY HISTORY:   Family History   Problem Relation Age of Onset     HEART DISEASE Brother 40     CABG     KIDNEY DISEASE Sister      Hypertension Sister      HEART DISEASE Brother      Dilated aorta       SOCIAL HISTORY:   Social History   Substance Use Topics     Smoking status: Former Smoker     Types: Cigarettes     Smokeless tobacco: Not on file     Alcohol Use: No     Pepe Whitfield RT  1/13/2017

## 2017-01-14 NOTE — CONSULTS
NUTRITION BRIEF NOTE:    UPDATES:   -Propofol discontinued on 1/13 due to elevated TGs (1297 mg/dL)  -BGs elevated to 286-328 mg/dL (H) --> Lantus increased to 26 units and insulin gtt started   -K+ and Na+ wnl. No Mg++ and Phos since 1/8    Goal EN Regimen/Provisions: Nepro @ 40 ml/hr x 24 hours (960 ml) + 3 pkts prostat to provide 1728 kcals (20 kcal/kg - 79% of estimated energy needs), 123 g protein (1.4 g/kg - 100% of estimated protein needs), 155 g CHO, 12 g fiber, 691 mg Phos, 1018 mg K+, 701 ml free water and 100% of DRIs.    Dosing Weight: 85 kg (dry weight)    ASSESSED NUTRITION NEEDS:  Estimated Energy Needs: 5224-6415 kcals (25-30 Kcal/Kg)  Justification: maintenance  Estimated Protein Needs: 102-170 grams protein (1.2-2 g pro/Kg)  Justification: hypercatabolism with critical illness and dialysis  Estimated Fluid Needs: 5484-5689  mL (1 mL/Kcal)  Justification: maintenance and per provider pending fluid status    RECOMMENDATIONS:  -Increased EN rate to 50 ml/hr to better meet estimated nutrition needs with propofol off.   Updated EN Regimen/Provisions: Nepro @ 50 ml/hr x 24 hours (1200 ml) + 3 pkts prostat to provide 2160 kcals (25 kcal/kg), 142 g (1.7 g/kg), 193 g CHO, 15 g fiber, 876 ml free water and 100% of DRIs    -Free H2O flushes = 60 ml q 4 hours     Marianne Mendoza RD, LD  Clinical Dietitian  3rd Floor/ICU Pager: [phone]  All Other Floors Pager: [phone]  Weekend/Holiday Pager: [phone]

## 2017-01-15 ENCOUNTER — APPOINTMENT (OUTPATIENT)
Dept: SPEECH THERAPY | Facility: CLINIC | Age: 67
DRG: 286 | End: 2017-01-15
Attending: INTERNAL MEDICINE
Payer: MEDICARE

## 2017-01-15 LAB
ANION GAP SERPL CALCULATED.3IONS-SCNC: 13 MMOL/L (ref 3–14)
ANISOCYTOSIS BLD QL SMEAR: ABNORMAL
BASO STIPL BLD QL SMEAR: ABNORMAL
BASOPHILS # BLD AUTO: 0 10E9/L (ref 0–0.2)
BASOPHILS NFR BLD AUTO: 0 %
BUN SERPL-MCNC: 82 MG/DL (ref 7–30)
CALCIUM SERPL-MCNC: 9.3 MG/DL (ref 8.5–10.1)
CHLORIDE SERPL-SCNC: 100 MMOL/L (ref 94–109)
CO2 SERPL-SCNC: 29 MMOL/L (ref 20–32)
CREAT SERPL-MCNC: 5.57 MG/DL (ref 0.66–1.25)
DIFFERENTIAL METHOD BLD: ABNORMAL
EOSINOPHIL # BLD AUTO: 0.2 10E9/L (ref 0–0.7)
EOSINOPHIL NFR BLD AUTO: 2 %
ERYTHROCYTE [DISTWIDTH] IN BLOOD BY AUTOMATED COUNT: 15.9 % (ref 10–15)
ERYTHROCYTE [DISTWIDTH] IN BLOOD BY AUTOMATED COUNT: 15.9 % (ref 10–15)
FERRITIN SERPL-MCNC: 6532 NG/ML (ref 26–388)
GFR SERPL CREATININE-BSD FRML MDRD: 10 ML/MIN/1.7M2
GLUCOSE BLDC GLUCOMTR-MCNC: 101 MG/DL (ref 70–99)
GLUCOSE BLDC GLUCOMTR-MCNC: 105 MG/DL (ref 70–99)
GLUCOSE BLDC GLUCOMTR-MCNC: 107 MG/DL (ref 70–99)
GLUCOSE BLDC GLUCOMTR-MCNC: 116 MG/DL (ref 70–99)
GLUCOSE BLDC GLUCOMTR-MCNC: 117 MG/DL (ref 70–99)
GLUCOSE BLDC GLUCOMTR-MCNC: 118 MG/DL (ref 70–99)
GLUCOSE BLDC GLUCOMTR-MCNC: 120 MG/DL (ref 70–99)
GLUCOSE BLDC GLUCOMTR-MCNC: 121 MG/DL (ref 70–99)
GLUCOSE BLDC GLUCOMTR-MCNC: 125 MG/DL (ref 70–99)
GLUCOSE BLDC GLUCOMTR-MCNC: 130 MG/DL (ref 70–99)
GLUCOSE BLDC GLUCOMTR-MCNC: 147 MG/DL (ref 70–99)
GLUCOSE BLDC GLUCOMTR-MCNC: 218 MG/DL (ref 70–99)
GLUCOSE BLDC GLUCOMTR-MCNC: 237 MG/DL (ref 70–99)
GLUCOSE BLDC GLUCOMTR-MCNC: 91 MG/DL (ref 70–99)
GLUCOSE BLDC GLUCOMTR-MCNC: 94 MG/DL (ref 70–99)
GLUCOSE BLDC GLUCOMTR-MCNC: 96 MG/DL (ref 70–99)
GLUCOSE BLDC GLUCOMTR-MCNC: 97 MG/DL (ref 70–99)
GLUCOSE SERPL-MCNC: 109 MG/DL (ref 70–99)
HCT VFR BLD AUTO: 19.9 % (ref 40–53)
HCT VFR BLD AUTO: 20.8 % (ref 40–53)
HGB BLD-MCNC: 6.3 G/DL (ref 13.3–17.7)
HGB BLD-MCNC: 6.4 G/DL (ref 13.3–17.7)
IRON SATN MFR SERPL: 65 % (ref 15–46)
IRON SERPL-MCNC: 122 UG/DL (ref 35–180)
LDH SERPL L TO P-CCNC: 292 U/L (ref 85–227)
LYMPHOCYTES # BLD AUTO: 0.7 10E9/L (ref 0.8–5.3)
LYMPHOCYTES NFR BLD AUTO: 6 %
MACROCYTES BLD QL SMEAR: PRESENT
MCH RBC QN AUTO: 28.3 PG (ref 26.5–33)
MCH RBC QN AUTO: 28.6 PG (ref 26.5–33)
MCHC RBC AUTO-ENTMCNC: 30.8 G/DL (ref 31.5–36.5)
MCHC RBC AUTO-ENTMCNC: 31.7 G/DL (ref 31.5–36.5)
MCV RBC AUTO: 91 FL (ref 78–100)
MCV RBC AUTO: 92 FL (ref 78–100)
METAMYELOCYTES # BLD: 0.1 10E9/L
METAMYELOCYTES NFR BLD MANUAL: 1 %
MICROCYTES BLD QL SMEAR: PRESENT
MONOCYTES # BLD AUTO: 0.1 10E9/L (ref 0–1.3)
MONOCYTES NFR BLD AUTO: 1 %
NEUTROPHILS # BLD AUTO: 10.5 10E9/L (ref 1.6–8.3)
NEUTROPHILS NFR BLD AUTO: 90 %
NRBC # BLD AUTO: 0.1 10*3/UL
NRBC BLD AUTO-RTO: 1 /100
PLATELET # BLD AUTO: 182 10E9/L (ref 150–450)
PLATELET # BLD AUTO: 187 10E9/L (ref 150–450)
PLATELET # BLD EST: NORMAL 10*3/UL
POIKILOCYTOSIS BLD QL SMEAR: SLIGHT
POTASSIUM SERPL-SCNC: 3.5 MMOL/L (ref 3.4–5.3)
RBC # BLD AUTO: 2.2 10E12/L (ref 4.4–5.9)
RBC # BLD AUTO: 2.26 10E12/L (ref 4.4–5.9)
RETICS # AUTO: 101.7 10E9/L (ref 25–95)
RETICS/RBC NFR AUTO: 4.4 % (ref 0.5–2)
SODIUM SERPL-SCNC: 142 MMOL/L (ref 133–144)
TIBC SERPL-MCNC: 189 UG/DL (ref 240–430)
WBC # BLD AUTO: 11.7 10E9/L (ref 4–11)
WBC # BLD AUTO: 14.1 10E9/L (ref 4–11)

## 2017-01-15 PROCEDURE — 25000132 ZZH RX MED GY IP 250 OP 250 PS 637: Mod: GY | Performed by: INTERNAL MEDICINE

## 2017-01-15 PROCEDURE — 00000146 ZZHCL STATISTIC GLUCOSE BY METER IP

## 2017-01-15 PROCEDURE — 86870 RBC ANTIBODY IDENTIFICATION: CPT | Performed by: INTERNAL MEDICINE

## 2017-01-15 PROCEDURE — A9270 NON-COVERED ITEM OR SERVICE: HCPCS | Mod: GY | Performed by: INTERNAL MEDICINE

## 2017-01-15 PROCEDURE — 25000125 ZZHC RX 250: Performed by: INTERNAL MEDICINE

## 2017-01-15 PROCEDURE — 92610 EVALUATE SWALLOWING FUNCTION: CPT | Mod: GN | Performed by: SPEECH-LANGUAGE PATHOLOGIST

## 2017-01-15 PROCEDURE — 85025 COMPLETE CBC W/AUTO DIFF WBC: CPT | Performed by: INTERNAL MEDICINE

## 2017-01-15 PROCEDURE — 40000275 ZZH STATISTIC RCP TIME EA 10 MIN

## 2017-01-15 PROCEDURE — 83550 IRON BINDING TEST: CPT | Performed by: INTERNAL MEDICINE

## 2017-01-15 PROCEDURE — 86850 RBC ANTIBODY SCREEN: CPT | Performed by: INTERNAL MEDICINE

## 2017-01-15 PROCEDURE — 20000003 ZZH R&B ICU

## 2017-01-15 PROCEDURE — 25000132 ZZH RX MED GY IP 250 OP 250 PS 637: Mod: GY

## 2017-01-15 PROCEDURE — 83540 ASSAY OF IRON: CPT | Performed by: INTERNAL MEDICINE

## 2017-01-15 PROCEDURE — 82728 ASSAY OF FERRITIN: CPT | Performed by: INTERNAL MEDICINE

## 2017-01-15 PROCEDURE — 94640 AIRWAY INHALATION TREATMENT: CPT | Mod: 76

## 2017-01-15 PROCEDURE — 92526 ORAL FUNCTION THERAPY: CPT | Mod: GN | Performed by: SPEECH-LANGUAGE PATHOLOGIST

## 2017-01-15 PROCEDURE — 85027 COMPLETE CBC AUTOMATED: CPT | Performed by: INTERNAL MEDICINE

## 2017-01-15 PROCEDURE — A9270 NON-COVERED ITEM OR SERVICE: HCPCS | Mod: GY

## 2017-01-15 PROCEDURE — 86901 BLOOD TYPING SEROLOGIC RH(D): CPT | Performed by: INTERNAL MEDICINE

## 2017-01-15 PROCEDURE — 86923 COMPATIBILITY TEST ELECTRIC: CPT | Performed by: INTERNAL MEDICINE

## 2017-01-15 PROCEDURE — 94799 UNLISTED PULMONARY SVC/PX: CPT

## 2017-01-15 PROCEDURE — 86900 BLOOD TYPING SEROLOGIC ABO: CPT | Performed by: INTERNAL MEDICINE

## 2017-01-15 PROCEDURE — 25000125 ZZHC RX 250: Performed by: SURGERY

## 2017-01-15 PROCEDURE — 25000308 HC RX OP HPI UCR WEL MED 250 IP 250: Performed by: INTERNAL MEDICINE

## 2017-01-15 PROCEDURE — 40000847 ZZHCL STATISTIC MORPHOLOGY W/INTERP HISTOLOGY TC 85060: Performed by: INTERNAL MEDICINE

## 2017-01-15 PROCEDURE — 85060 BLOOD SMEAR INTERPRETATION: CPT | Performed by: INTERNAL MEDICINE

## 2017-01-15 PROCEDURE — 40000225 ZZH STATISTIC SLP WARD VISIT: Performed by: SPEECH-LANGUAGE PATHOLOGIST

## 2017-01-15 PROCEDURE — 82607 VITAMIN B-12: CPT | Performed by: INTERNAL MEDICINE

## 2017-01-15 PROCEDURE — 85045 AUTOMATED RETICULOCYTE COUNT: CPT | Performed by: INTERNAL MEDICINE

## 2017-01-15 PROCEDURE — 86860 RBC ANTIBODY ELUTION: CPT | Performed by: INTERNAL MEDICINE

## 2017-01-15 PROCEDURE — 80048 BASIC METABOLIC PNL TOTAL CA: CPT | Performed by: INTERNAL MEDICINE

## 2017-01-15 PROCEDURE — 83615 LACTATE (LD) (LDH) ENZYME: CPT | Performed by: INTERNAL MEDICINE

## 2017-01-15 PROCEDURE — 99232 SBSQ HOSP IP/OBS MODERATE 35: CPT | Performed by: INTERNAL MEDICINE

## 2017-01-15 PROCEDURE — 82746 ASSAY OF FOLIC ACID SERUM: CPT | Performed by: INTERNAL MEDICINE

## 2017-01-15 PROCEDURE — 86880 COOMBS TEST DIRECT: CPT | Performed by: INTERNAL MEDICINE

## 2017-01-15 PROCEDURE — 83010 ASSAY OF HAPTOGLOBIN QUANT: CPT | Performed by: INTERNAL MEDICINE

## 2017-01-15 PROCEDURE — 94640 AIRWAY INHALATION TREATMENT: CPT

## 2017-01-15 PROCEDURE — 99233 SBSQ HOSP IP/OBS HIGH 50: CPT | Performed by: INTERNAL MEDICINE

## 2017-01-15 RX ORDER — DEXTROSE MONOHYDRATE 25 G/50ML
25-50 INJECTION, SOLUTION INTRAVENOUS
Status: DISCONTINUED | OUTPATIENT
Start: 2017-01-15 | End: 2017-01-22 | Stop reason: HOSPADM

## 2017-01-15 RX ORDER — ALBUTEROL SULFATE 0.83 MG/ML
2.5 SOLUTION RESPIRATORY (INHALATION)
Status: DISCONTINUED | OUTPATIENT
Start: 2017-01-15 | End: 2017-01-18

## 2017-01-15 RX ORDER — GABAPENTIN 250 MG/5ML
300 SOLUTION ORAL EVERY MORNING
Status: DISCONTINUED | OUTPATIENT
Start: 2017-01-15 | End: 2017-01-16

## 2017-01-15 RX ORDER — NICOTINE POLACRILEX 4 MG
15-30 LOZENGE BUCCAL
Status: DISCONTINUED | OUTPATIENT
Start: 2017-01-15 | End: 2017-01-22 | Stop reason: HOSPADM

## 2017-01-15 RX ORDER — GABAPENTIN 250 MG/5ML
600 SOLUTION ORAL EVERY EVENING
Status: DISCONTINUED | OUTPATIENT
Start: 2017-01-15 | End: 2017-01-16

## 2017-01-15 RX ADMIN — INSULIN ASPART 1 UNITS: 100 INJECTION, SOLUTION INTRAVENOUS; SUBCUTANEOUS at 14:50

## 2017-01-15 RX ADMIN — CHLORHEXIDINE GLUCONATE 15 ML: 1.2 RINSE ORAL at 10:08

## 2017-01-15 RX ADMIN — ALBUTEROL SULFATE 2.5 MG: 2.5 SOLUTION RESPIRATORY (INHALATION) at 12:41

## 2017-01-15 RX ADMIN — CALCIUM ACETATE 2001 MG: 667 CAPSULE ORAL at 09:54

## 2017-01-15 RX ADMIN — CALCIUM ACETATE 2001 MG: 667 CAPSULE ORAL at 21:49

## 2017-01-15 RX ADMIN — ALBUTEROL SULFATE 2.5 MG: 2.5 SOLUTION RESPIRATORY (INHALATION) at 23:26

## 2017-01-15 RX ADMIN — HYDROMORPHONE HYDROCHLORIDE 0.5 MG: 10 INJECTION, SOLUTION INTRAMUSCULAR; INTRAVENOUS; SUBCUTANEOUS at 17:12

## 2017-01-15 RX ADMIN — HEPARIN SODIUM 5000 UNITS: 5000 INJECTION, SOLUTION INTRAVENOUS; SUBCUTANEOUS at 10:01

## 2017-01-15 RX ADMIN — MYCOPHENOLATE MOFETIL 500 MG: 250 CAPSULE ORAL at 10:53

## 2017-01-15 RX ADMIN — TAZOBACTAM SODIUM AND PIPERACILLIN SODIUM 2.25 G: 250; 2 INJECTION, SOLUTION INTRAVENOUS at 02:04

## 2017-01-15 RX ADMIN — GABAPENTIN 300 MG: 250 SOLUTION ORAL at 10:56

## 2017-01-15 RX ADMIN — HYDROMORPHONE HYDROCHLORIDE 0.5 MG: 10 INJECTION, SOLUTION INTRAMUSCULAR; INTRAVENOUS; SUBCUTANEOUS at 07:51

## 2017-01-15 RX ADMIN — INSULIN GLARGINE 26 UNITS: 100 INJECTION, SOLUTION SUBCUTANEOUS at 10:46

## 2017-01-15 RX ADMIN — INSULIN ASPART 4 UNITS: 100 INJECTION, SOLUTION INTRAVENOUS; SUBCUTANEOUS at 21:21

## 2017-01-15 RX ADMIN — CALCIUM ACETATE 2001 MG: 667 CAPSULE ORAL at 13:04

## 2017-01-15 RX ADMIN — INSULIN ASPART 4 UNITS: 100 INJECTION, SOLUTION INTRAVENOUS; SUBCUTANEOUS at 17:59

## 2017-01-15 RX ADMIN — SODIUM CHLORIDE SOLN NEBU 3% 4 ML: 3 NEBU SOLN at 07:42

## 2017-01-15 RX ADMIN — ASPIRIN 81 MG 81 MG: 81 TABLET ORAL at 21:50

## 2017-01-15 RX ADMIN — SODIUM CHLORIDE SOLN NEBU 3% 4 ML: 3 NEBU SOLN at 19:57

## 2017-01-15 RX ADMIN — SODIUM CHLORIDE SOLN NEBU 3% 4 ML: 3 NEBU SOLN at 12:41

## 2017-01-15 RX ADMIN — CARVEDILOL 25 MG: 25 TABLET, FILM COATED ORAL at 09:57

## 2017-01-15 RX ADMIN — RITONAVIR 100 MG: 100 TABLET, FILM COATED ORAL at 21:54

## 2017-01-15 RX ADMIN — ALBUTEROL SULFATE 2.5 MG: 2.5 SOLUTION RESPIRATORY (INHALATION) at 15:33

## 2017-01-15 RX ADMIN — HYDROMORPHONE HYDROCHLORIDE 0.5 MG: 10 INJECTION, SOLUTION INTRAMUSCULAR; INTRAVENOUS; SUBCUTANEOUS at 01:20

## 2017-01-15 RX ADMIN — ALBUTEROL SULFATE 2.5 MG: 2.5 SOLUTION RESPIRATORY (INHALATION) at 07:42

## 2017-01-15 RX ADMIN — Medication 40 MG: at 11:35

## 2017-01-15 RX ADMIN — HYDROMORPHONE HYDROCHLORIDE 0.5 MG: 10 INJECTION, SOLUTION INTRAMUSCULAR; INTRAVENOUS; SUBCUTANEOUS at 04:58

## 2017-01-15 RX ADMIN — TAZOBACTAM SODIUM AND PIPERACILLIN SODIUM 2.25 G: 250; 2 INJECTION, SOLUTION INTRAVENOUS at 11:41

## 2017-01-15 RX ADMIN — GABAPENTIN 600 MG: 250 SOLUTION ORAL at 21:51

## 2017-01-15 RX ADMIN — Medication 2 UNITS/HR: at 05:15

## 2017-01-15 RX ADMIN — CLOPIDOGREL 75 MG: 75 TABLET, FILM COATED ORAL at 21:50

## 2017-01-15 RX ADMIN — DAPSONE 100 MG: 25 TABLET ORAL at 21:49

## 2017-01-15 RX ADMIN — LOSARTAN POTASSIUM 100 MG: 100 TABLET, FILM COATED ORAL at 21:50

## 2017-01-15 RX ADMIN — TAZOBACTAM SODIUM AND PIPERACILLIN SODIUM 2.25 G: 250; 2 INJECTION, SOLUTION INTRAVENOUS at 05:13

## 2017-01-15 RX ADMIN — CARVEDILOL 25 MG: 25 TABLET, FILM COATED ORAL at 21:51

## 2017-01-15 RX ADMIN — MYCOPHENOLATE MOFETIL 500 MG: 250 CAPSULE ORAL at 21:50

## 2017-01-15 RX ADMIN — HEPARIN SODIUM 5000 UNITS: 5000 INJECTION, SOLUTION INTRAVENOUS; SUBCUTANEOUS at 21:51

## 2017-01-15 RX ADMIN — SODIUM CHLORIDE SOLN NEBU 3% 4 ML: 3 NEBU SOLN at 23:26

## 2017-01-15 RX ADMIN — CHLORHEXIDINE GLUCONATE 15 ML: 1.2 RINSE ORAL at 21:50

## 2017-01-15 RX ADMIN — INSULIN GLARGINE 26 UNITS: 100 INJECTION, SOLUTION SUBCUTANEOUS at 21:36

## 2017-01-15 RX ADMIN — ALBUTEROL SULFATE 2.5 MG: 2.5 SOLUTION RESPIRATORY (INHALATION) at 03:50

## 2017-01-15 RX ADMIN — ABACAVIR 600 MG: 300 TABLET ORAL at 21:54

## 2017-01-15 RX ADMIN — DEXTRAN 70, AND HYPROMELLOSE 2910 2 DROP: 1; 3 SOLUTION/ DROPS OPHTHALMIC at 07:53

## 2017-01-15 RX ADMIN — MAGNESIUM OXIDE TAB 400 MG (241.3 MG ELEMENTAL MG) 400 MG: 400 (241.3 MG) TAB at 21:50

## 2017-01-15 RX ADMIN — TAZOBACTAM SODIUM AND PIPERACILLIN SODIUM 2.25 G: 250; 2 INJECTION, SOLUTION INTRAVENOUS at 23:49

## 2017-01-15 RX ADMIN — TAZOBACTAM SODIUM AND PIPERACILLIN SODIUM 2.25 G: 250; 2 INJECTION, SOLUTION INTRAVENOUS at 17:21

## 2017-01-15 RX ADMIN — ALBUTEROL SULFATE 2.5 MG: 2.5 SOLUTION RESPIRATORY (INHALATION) at 19:57

## 2017-01-15 RX ADMIN — SODIUM CHLORIDE SOLN NEBU 3% 4 ML: 3 NEBU SOLN at 03:50

## 2017-01-15 RX ADMIN — Medication 5 ML: at 10:10

## 2017-01-15 RX ADMIN — DARUNAVIR 800 MG: 800 TABLET, FILM COATED ORAL at 21:55

## 2017-01-15 RX ADMIN — SODIUM CHLORIDE SOLN NEBU 3% 4 ML: 3 NEBU SOLN at 15:33

## 2017-01-15 ASSESSMENT — PAIN DESCRIPTION - DESCRIPTORS
DESCRIPTORS: ACHING

## 2017-01-15 NOTE — PLAN OF CARE
Problem: Goal Outcome Summary  Goal: Goal Outcome Summary  Outcome: No Change  ICU End of Shift Summary.  For vital signs and complete assessments, please see documentation flowsheets.     Pertinent assessments: Disoriented to time and situation. Afebrile. C/o pain in jayden-area/buttocks, excoriated: barrier cream applied and dilaudid x 1 given. Dark brown/black stool bypassing flexiseal x 3. Re-inflated balloon with less leakage noted.   Major Shift Events: Extubated at 1530. Precedex weaned off. NG removed. TF dc'd. Tolerating well O2 sats 90's. Productive cough with thick sputum. Dialysis run today.   Plan (Upcoming Events): Plan to start PO intake 2000 today. Continue Vanco, levaquin and zosyn.   Discharge/Transfer Needs:     Bedside Shift Report Completed   Bedside Safety Check Completed

## 2017-01-15 NOTE — PROGRESS NOTES
"[facility]  Hospitalist Progress Note  Susan Farah MD 01/15/2017    Reason for Stay (Diagnosis): presented with chest pain then became septic and hypoxic         Assessment and Plan:      Summary of Stay: Donald Raza is a 66 year old male admitted on 1/3/2017    Problem List:   1. Presented with chest pain then became septic ( needed vasopressors) and hypoxic  2. Bilateral basilar pneumonia  3. Acute hypoxic respiratory failure- intubated 1/7 and failed extubation 1/9  4. ESRD  On dialysis  5. Progressive anemia  Hemoglobin was 6.2 on 1/5 through 1/10 and was given a total of 5 units  Hemoglobin Has now dropped again to 6.3 cause has not been identified !!!  6. HIV  For 30 years on antivirals  non-detectable viral load  7. Stable CAD  8. Hypertension and diabetes  9. Extubated 1/13 without difficulty    PLAN  Stable and comfortable breathing  Able to get up to sit in chair  Have reviewed the patient's hemoglobin over this hospitalization on admission was 7.5 then dropped to 6.0 and stayed low for five days and he received 5 units total  And is now 6.3 down from 8 to 8.3  Will send work up for hemolysis ( haptoglobin,MARCO,reticulocyte count, smear, iron, b 12)  Will type and cross to give blood during dialysis   ( Has a lot of antibodies so the blood comes from [place])  Change insulin dosing from IV infusion to lantus bid and novolog q 4  Continue levaquin and zosyn  Up to chair and PT  May advance diet          Interval History (Subjective):      Able to sit in the chair and is interactive and appropriate                  Physical Exam:      Last Vital Signs:  /81 mmHg  Pulse 64  Temp(Src) 97.8  F (36.6  C) (Axillary)  Resp 23  Ht 1.803 m (5' 11\")  Wt 80.6 kg (177 lb 11.1 oz)  BMI 24.79 kg/m2  SpO2 95%      Constitutional: Alert oriented appropriate responses   Respiratory: Clear to auscultation bilaterally, no rales or wheezing  No increased secretions  Face mask " oxymizer 8 lpm   Cardiovascular: Regular rate and rhythm, normal S1 and S2, and no murmur noted   Abdomen: Normal bowel sounds, soft, non-distended, non-tender   Skin: No rashes, no cyanosis, dry to touch   Neuro:  no focal weakness, oriented to self and events and date   Extremities: No edema,    Other(s): No fevers    No pain   All other systems: Negative          Medications:      All current medications were reviewed with changes reflected in problem list.         Data:      All new lab and imaging data was reviewed.   Labs: Na 142 K 3.5  Bicarb 29 creat 5.57 glucose 117   wbc 14.1 up from 9.3 hemoglobin   6.3 down from 7.9 platelets 182  Triglycerides 1297  Imaging: CT 1/6 showed bilat consolidation   Chest xray 1/11 clear

## 2017-01-15 NOTE — PLAN OF CARE
Respiratory Therapy-    Patient seen for scheduled nebulizer.Patient using aerobika after nebs and own his own. Patient has fair congested cough-using yankauer for oral sx. Breath sounds-diminished and coarse. RR 20, HR 80, Will continue to monitor patient.   SPO2 on 9lpm oxymizer NC 96%    Jina Hernandez , RT  January 15, 2017 3:44 PM

## 2017-01-15 NOTE — PROGRESS NOTES
" INFECTIOUS DISEASE Progress Note  January 14, 2017  [id number]  Donald Guevaraido    ANTIBIOTICS:  Zosyn  Vancomycin  Levofloxacin 500 mg iv q 48    SUBJECTIVE: afebrile, doing well, has been up in chair, on 7-9 L O2, cont to improve, not much cough and nil sputum, notes reviewed    OBJECTIVE:  /53 mmHg  Pulse 64  Temp(Src) 97.8  F (36.6  C) (Axillary)  Resp 17  Ht 1.803 m (5' 11\")  Wt 80.6 kg (177 lb 11.1 oz)  BMI 24.79 kg/m2  SpO2 97%    LAB Data:    MICROBIOLOGY:  BC NG  SC NG    IMAGING:  CXR 1/11-  FINDINGS: Interval pullback of an endotracheal tube with distal tube  tip now in the trachea and approximately 1.6 cm proximal to the  jerry. An enteric drainage tube is again present with distal tube tip  in the gastric fundus. A right upper extremity peripherally inserted  central catheter is present with distal catheter tip in the superior  vena cava. Hypoinflated lungs. The lungs are otherwise clear. Possible  cardiomegaly. A vascular stent is present in the left axillary region.    Attestation:  I have reviewed today's vital signs, notes, medications, labs and imaging.    ASSESSMENT:  1. PNEUMONIA-improving  2. RESPIRATORY FAILURE-Improved, doing well  3. HIV-VL ND,   4. ESRD      RECOMMENDATION:   1. Cont current antibiotics  2. F/u O2 needs   3. PT     GEORGIE BAKER M.D.  O:[phone]   B:[phone]          "

## 2017-01-15 NOTE — PLAN OF CARE
Problem: Goal Outcome Summary  Goal: Goal Outcome Summary    Clinical Swallow Evaluation Completed                                      Result: Bedside swallow evaluation ordered s/p extubation yesterday (7 days intubated) and + s/sx of aspiration with liquids last PM. Pt upright in chair. O2 via nasal oxymizer @ 7LPM. Sats around 90% and dipping below with slow recovery, persistently 85-89%. RN increased O2 to 9 LPM after evaluation with slow return to 90%. Pt with baseline weak cough, somewhat productive. Ind with oral suction as needed. Trials of ice chip, thin liquids, nectar-thick liquids and puree with slow AP movement and pharyngeal initiation, decreased laryngeal elevation, no overt s/sx of aspiration (chin tuck with most trials as pt instructed by RN). Pt at risk for silent aspiration given prolonged intubation, O2 needs.     Recommend:  1. Diet of Dysphagia Diet Level 1 (Puree) with NECTAR-thick liquids (MD please order if in agreement.) Cautious initiation given recent extubation, O2 needs and overall weakness. No straws, slow rate, small bites/sips. Chin tuck with each swallow. Hold PO if s/sx of aspiration. Ok for single ice chips between meals after oral care.    2. Follow Aspiration Precautions including:               To Reduce the Risk of Aspiration:  Sit upright 90 degrees for PO in chair, as able  Sit upright 15-30 min after PO  PO only when fully alert  Alternate liquids/solids  No straws  Small bites/sips  Slow rate for PO  Medications whole with pudding/puree as able , crushed if needed  Level of supervision: close monitoring    3. SLP to follow daily to address dysphagia until goals are met.    4. Anticipate D/C to TCU with further SLP recommended at next level of care.    See Progress Notes for full details of today's Clinical Swallowing assessment.    Thank you for this referral, call if concerns @ [phone] Rehabilitation Services Department.  Speech-Language Pathology

## 2017-01-15 NOTE — PLAN OF CARE
"Problem: Individualization  Goal: Patient Preferences  ICU End of Shift Summary.  For vital signs and complete assessments, please see documentation flowsheets.     Pertinent assessments: Pt A+OX3, pleas and coop, extubated Sat and placed 0n 8 liters Oxymask, doing well w/ Sats 94-96%.  Major Shift Events: Orders received to give sips clears 4 hrs after extubation. Attempted this twice w/ pt sitting up straight and tucking in chin, but pt. Unable w/ coughing bouts and very weak swallow reflex. Pt also c/o's of \"severe pain to buttocks and scrotal area unrelieved w/ posiitonal changes.  Plan (Upcoming Events): Will ask MD for Swallow Eval order and give Dilauid for pain, which pt states relieves this pain.  Discharge/Transfer Needs:     Bedside Shift Report Completed   Bedside Safety Check Completed                 "

## 2017-01-15 NOTE — PLAN OF CARE
Problem: Goal Outcome Summary  Goal: Goal Outcome Summary  PT: Orders received, chart reviewed. Attempted to see patient for PT evaluation. He declines at this time due to fatigue and he reports intermittent dizziness. Will reschedule for tomorrow per pt request.

## 2017-01-15 NOTE — PROGRESS NOTES
Clinical Bedside Swallow Evaluation  [facility] Inpatient  01/15/17 1230   General Information   Onset Date 01/03/17   Start of Care Date 01/15/17   Referring Physician Dr. PEDRO Crump   Patient Profile Review/OT: Additional Occupational Profile Info See Profile for full history and prior level of function   Patient/Family Goals Statement Not stated   Swallowing Evaluation Bedside swallow evaluation   Behaviorial Observations Alert  (Cooperative)   Mode of current nutrition (Clear liquids)   Respiratory Status O2 Supply   Type of O2 supply (Oxymizer 7 LPM)   Comments Donald Raza is a 66 year old male with complex medical hx of HAART for hx of HIV, ESRD on HD, CAD, Hypertension, DM type 2 on insulin, chronic anemia  admitted on 1/3/2017 with episodes of chest pain and was determined non cardiac in etiology given recent heart cath findings.  Code blue 1/7/17 with intubation. Presumed HCAP.  Failed extubation on 1/9, self extubated on 1/10 and was reintubated. Extubated 1/14/17 afternoon. MD orders for clear liquids/meds 4 hrs after extubation. Coughing noted with thin liquids. Per RN, overall strength and voice improved since yesterday. Pt up in chair for evaluation.     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, recurrent     Hypertension 2010     CKD (chronic kidney disease)      Hemodialysis     Mixed hyperlipidemia      Coronary artery disease      stents     NSTEMI (non-ST elevated myocardial infarction) (H) 12/2015, 5/2016     Pulmonary HTN (H)      Mod     Near syncope 2016     with hemodialysis     TIA (transient ischemic attack) 5/2016     Increased prostate specific antigen (PSA) velocity 08/08/2016     Awaiting bx on blood thinner     Bilateral pneumonia 1/7/2017       Clinical Swallow Evaluation   Oral  Musculature generally intact   Dentition upper dentures  (Lower dentures do not fit, does not wear at baseline)   Secretion Management (weak cough, productive at times with oral suction prn)   Mucosal Quality good   Mandibular Strength and Mobility impaired  (weak)   Oral Labial Strength and Mobility impaired retraction;impaired pursing;impaired seal;impaired coordination;other (see comments)  (weak)   Lingual Strength and Mobility impaired protrusion;impaired anterior elevation;impaired left lateral movement;impaired right lateral movement;impaired coordination  (weak, incoordination)   Laryngeal Function Cough;Throat clear;Swallow;Voicing initiated;Dry swallow palpated   Oral Musculature Comments Generalized weakness, incoordination   Additional Documentation Yes   Clinical Swallow Eval: Thin Liquid Texture Trial   Mode of Presentation, Thin Liquids spoon;cup;self-fed   Volume of Liquid or Food Presented ice chip x1, sips of water   Oral Phase of Swallow (slow, oral holding prior to swallow initiation)   Pharyngeal Phase of Swallow impaired;reduction in laryngeal movement  (weak, delayed)   Successful Strategies Trialed During Procedure chin tuck   Diagnostic Statement No overt s/sx of aspiration with ice chip or sips of water with chin tuck, Increased risk of silent aspiration given medical course.   Clinical Swallow Eval: Nectar Thick Liquid Texture Trial   Mode of Presentation, Nectar cup;self-fed   Volume of Nectar Presented sips water via cup   Oral Phase, Nectar (slow)   Pharyngeal Phase, Nectar reduction in laryngeal movement  (delayed)   Successful Strategies Trialed During Procedure, Nectar chin tuck   Diagnostic Statement Appears WFL for small sips, cues for chin tuck. Monitor for s/sx of silent aspiration.   Clinical Swallow Eval: Puree Solid Texture Trial   Mode of Presentation, Puree spoon;fed by clinician   Volume of Puree Presented applesauce x3 tsp   Oral Phase, Puree (slow AP movement)    Pharyngeal Phase, Puree reduction in laryngeal movement   Diagnostic Statement Appears WFL for small bites, cues for chin tuck   Swallow Compensations   Swallow Compensations Chin tuck   General Therapy Interventions   Planned Therapy Interventions Dysphagia Treatment   Dysphagia treatment Oropharyngeal exercise training;Modified diet education;Instruction of safe swallow strategies;Compensatory strategies for swallowing   Swallow Eval: Clinical Impressions   Skilled Criteria for Therapy Intervention Skilled criteria met.  Treatment indicated.   Functional Assessment Scale (FAS) 2   Dysphagia Outcome Severity Scale (RIO) Level 2 - RIO   Treatment Diagnosis Mod-severe oral-pharyngeal dysphagia characterized by slow AP movement, swallow delay, decreased laryngeal elevation with increased risk of silent aspiration given medical course, weakness, respiratory status   Diet texture recommendations Dysphagia diet level 1;Nectar thick liquids  (with caution. OK for single ice chips)   Recommended Feeding/Eating Techniques alternate between small bites and sips of food/liquid;check mouth frequently for oral residue/pocketing;maintain upright posture during/after eating for 30 mins;no straws;small sips/bites;tuck chin during every swallow   Therapy Frequency daily   Predicted Duration of Therapy Intervention (days/wks) x1 week   Anticipated Discharge Disposition extended care facility   Risks and Benefits of Treatment have been explained. Yes   Patient, family and/or staff in agreement with Plan of Care Yes   Clinical Impression Comments Bedside swallow evaluation ordered s/p extubation yesterday (7 days intubated) and + s/sx of aspiration with liquids last PM. Pt upright in chair. O2 via nasal oxymizer @ 7LPM. Sats around 90% and dipping below with slow recovery, persistently 85-89%. RN increased O2 to 9 LPM after evaluation with slow return to 90%. Pt with baseline weak cough, somewhat productive. Ind with oral suction  as needed. Trials of ice chip, thin liquids, nectar-thick liquids and puree with slow AP movement and pharyngeal initiation, decreased laryngeal elevation, no overt s/sx of aspiration (chin tuck with most trials as pt instructed by RN). Pt at risk for silent aspiration given prolonged intubation, O2 needs   Total Evaluation Time   Total Evaluation Time (Minutes) 10

## 2017-01-15 NOTE — PROGRESS NOTES
[facility] Intensive Care Progress Note  Problem list  End-stage renal disease  HIV  Respiratory failure due to pneumonia  Type 2 diabetes  Chronic anemia    Date of Service (when I saw the patient): 01/15/2017     Assessment and Plan  Donald Raza is a 66 year old male with complex medical hx of HAART for hx of HIV, ESRD on HD, CAD, Hypertension, DM type 2 on insulin, chronic anemia  admitted on 1/3/2017 with episodes of chest pain and was determined non cardiac in etiology given recent heart cath findings.  Failed extubation on 1/9, self extubated on 1/10 and was reintubated.  Successful extubation on 1/14      Neurology: arousable, appropriate  Plan: Propofol discontinued due to hypertriglyceridemia,   Requiring any sedatives at this time    Cardiovascular:  Septic shock requiring vasopressor, resolved. H/o CAD and multiple PCI. ECG unremarkable. He was started on echo showed LVH and left atrial dilatation  Plan:    Maintain normotension and euvolemia    Pulmonary:        Hypoxemic respiratory failure requiring intubation on 1/7 most likely sec to PNA: febrile, bilateral LL infiltrates.  Failed self extubation 1/10, due to secretions it sounds like; still with secretions suctioning  Plan: Continue broad spectrum ABX  Nebulized saline solution to help clear secretions, along with incentive spirometry and Acapella valve  Tolerating extubation, on Oxymizer nasal cannula    Renal  Hyperkalemia on admission resolved. H/o ESRD on HD, T/R/S   Plan: Monitor.     ID:         Sepsis, probable pneumonia: HIV on antiretroviral therapy. Temp improved, cultures NGTD; Agree with ID rationale, PJP unlikely. Bronchoscopy can be performed but would probably not be helpful for bacterial etiology given time of antibiotics  Plan: Continue broad spectrum antibiotics (started 1/6) as clinically improved, no culture data to guide narrow  Probably stop tomorrow, prefer to confirm with ID     GI  No  concerns.    Nutrition  Malnutrition.  Plan: nutrition consult for TF    Endocrine:  H/o DM type II.   Plan: Monitor and treat hyperglycemia.      Heme/Onc:  Acute on chronic anemia. Thrombocytopenia. No clear source of bleeding.2 unit PRBC for hgb 5,4 and then 61. on 1/7/17 .   Plan: Hgb 6.3 today,  patient prefers to get blood with dialysis so we'll plan for transfusion on Tuesday unless he develops evidence of shock  Reduce blood draws except for before dialysis    DVT Prophylaxis: Pneumatic Compression Devices  GI Prophylaxis: PPI    Restraints: Restraints for medical healing needed: YES    Family update by me today: no.    Georgie Crump    Time Spent on This Encounter  Billing:  I spent 35 minutes bedside and on the inpatient unit today managing the critical care of Donald Raza in relation to the issues listed in this note.    Main Plans for Today  Incentive spirometry and flutter valve  Avoid blood draws unless absolutely necessary    Physical Exam  Temp: 98.1  F (36.7  C) Temp src: Axillary Temp  Min: 97.3  F (36.3  C)  Max: 100  F (37.8  C) BP: (!) 169/91 mmHg   Heart Rate: 83 Resp: 20 SpO2: 94 % O2 Device: Oxymask Oxygen Delivery: 10 LPM  Filed Vitals:    01/12/17 0730 01/14/17 0645 01/15/17 0523   Weight: 85.9 kg (189 lb 6 oz) 85.8 kg (189 lb 2.5 oz) 80.6 kg (177 lb 11.1 oz)     I/O last 3 completed shifts:  In: 2005.14 [I.V.:1315.14; NG/GT:310]  Out: 3000 [Other:3000]    Neurologic: Arousable, grossly non-focal.   Cardiovascular: RRR, no murmurs and gallops, pulses present all 4 extremities.    Respiratory: breath sounds course bl, decrease bl LL.  GI: soft, non-tender, non-distended.    Skin/Extremities: pale, no deformities or edema.    Lines: No erythema or discharge at entry site for PICC Line  Current lines are required for patient management    Medications    insulin (regular) 1.5 Units/hr (01/15/17 1015)     NaCl 10 mL/hr at 01/13/17 1853     IV fluid REPLACEMENT ONLY       NaCl 10 mL/hr at  01/13/17 1900       B and C vitamin Complex with folic acid  5 mL Per Feeding Tube Daily     gabapentin  300 mg Per Feeding Tube QAM     gabapentin  600 mg Per Feeding Tube QPM     insulin glargine  26 Units Subcutaneous BID     aspirin chewable tablet 81 mg  81 mg Oral QPM     carvedilol  25 mg Oral BID w/meals     dapsone  100 mg Oral At Bedtime     losartan  100 mg Oral At Bedtime     magnesium oxide (MAG-OX) tablet 400 mg  400 mg Oral At Bedtime     mycophenolate  500 mg Oral BID     pantoprazole  40 mg Oral Daily     albuterol  2.5 mg Nebulization Q4H     sodium chloride  4 mL Nebulization Q4H     heparin  5,000 Units Subcutaneous BID     sodium chloride (PF)  10 mL Intracatheter Q7 Days     piperacillin-tazobactam  2.25 g Intravenous Q6H     vancomycin place koenig - receiving intermittent dosing  1 each Does not apply See Admin Instructions     levofloxacin  500 mg Intravenous Q48H     - MEDICATION INSTRUCTIONS for Dialysis Patients -   Does not apply See Admin Instructions     iopamidol  100 mL Intravenous Once     sodium chloride (PF)  3 mL Intracatheter Q8H     abacavir  600 mg Oral QPM     calcium acetate  2,001 mg Oral TID w/meals     chlorhexidine  15 mL Swish & Spit BID     clopidogrel (PLAVIX) tablet 75 mg  75 mg Oral QPM     darunavir  800 mg Oral At Bedtime     dolutegravir  50 mg Oral At Bedtime     ritonavir  100 mg Oral At Bedtime       Data    Recent Labs  Lab 01/15/17  0530 01/12/17  0526 01/11/17  0446  01/09/17  0544   WBC 14.1* 9.3 8.6  < > 5.3   HGB 6.3* 7.9* 8.3*  < > 6.5*   MCV 91 91 90  < > 90    154 138*  < > 112*    137 137  < > 137   POTASSIUM 3.5 4.1 4.0  < > 4.1   CHLORIDE 100 94 96  < > 98   CO2 29 29 29  < > 27   BUN 82* 65* 42*  < > 48*   CR 5.57* 7.76* 6.05*  < > 7.42*   ANIONGAP 13 14 12  < > 12   PRABHA 9.3 9.2 9.0  < > 8.1*   * 240* 211*  < > 107*   ALBUMIN  --  2.2*  --   --  2.2*   PROTTOTAL  --  6.8  --   --  6.0*   BILITOTAL  --  0.8  --   --  0.7    ALKPHOS  --  88  --   --  50   ALT  --  152*  --   --  264*   AST  --  66*  --   --  224*   < > = values in this interval not displayed.  No results found for this or any previous visit (from the past 24 hour(s)).   Georgie Crump

## 2017-01-15 NOTE — PROGRESS NOTES
Chart reviewed.  Hb down to 6.3 - plan is to give blood c next HD.  Extubated c coarse sounds noted and 9L o2 needs.    1.  HD tmrw.  Orders written.  2.  Plan to give 2 units of blood c the run tmrw.

## 2017-01-16 ENCOUNTER — APPOINTMENT (OUTPATIENT)
Dept: SPEECH THERAPY | Facility: CLINIC | Age: 67
DRG: 286 | End: 2017-01-16
Payer: MEDICARE

## 2017-01-16 LAB
ABO + RH BLD: NORMAL
ABO + RH BLD: NORMAL
ALBUMIN SERPL-MCNC: 2.4 G/DL (ref 3.4–5)
ANION GAP SERPL CALCULATED.3IONS-SCNC: 15 MMOL/L (ref 3–14)
BLD GP AB SCN SERPL QL: NORMAL
BLD PROD TYP BPU: NORMAL
BLD UNIT ID BPU: 0
BLD UNIT ID BPU: 0
BLOOD BANK CMNT PATIENT-IMP: NORMAL
BLOOD PRODUCT CODE: NORMAL
BLOOD PRODUCT CODE: NORMAL
BPU ID: NORMAL
BPU ID: NORMAL
BUN SERPL-MCNC: 104 MG/DL (ref 7–30)
CALCIUM SERPL-MCNC: 10.1 MG/DL (ref 8.5–10.1)
CHLORIDE SERPL-SCNC: 99 MMOL/L (ref 94–109)
CO2 SERPL-SCNC: 29 MMOL/L (ref 20–32)
COPATH REPORT: NORMAL
CREAT SERPL-MCNC: 7.56 MG/DL (ref 0.66–1.25)
ERYTHROCYTE [DISTWIDTH] IN BLOOD BY AUTOMATED COUNT: 15.8 % (ref 10–15)
FOLATE SERPL-MCNC: 29.8 NG/ML
GFR SERPL CREATININE-BSD FRML MDRD: 7 ML/MIN/1.7M2
GLUCOSE BLDC GLUCOMTR-MCNC: 114 MG/DL (ref 70–99)
GLUCOSE BLDC GLUCOMTR-MCNC: 116 MG/DL (ref 70–99)
GLUCOSE BLDC GLUCOMTR-MCNC: 191 MG/DL (ref 70–99)
GLUCOSE BLDC GLUCOMTR-MCNC: 89 MG/DL (ref 70–99)
GLUCOSE SERPL-MCNC: 100 MG/DL (ref 70–99)
HAPTOGLOB SERPL-MCNC: 233 MG/DL (ref 35–175)
HCT VFR BLD AUTO: 19.6 % (ref 40–53)
HEMOCCULT STL QL: POSITIVE
HGB BLD-MCNC: 5.9 G/DL (ref 13.3–17.7)
MAGNESIUM SERPL-MCNC: 2.5 MG/DL (ref 1.6–2.3)
MCH RBC QN AUTO: 27.7 PG (ref 26.5–33)
MCHC RBC AUTO-ENTMCNC: 30.1 G/DL (ref 31.5–36.5)
MCV RBC AUTO: 92 FL (ref 78–100)
NUM BPU REQUESTED: 2
PHOSPHATE SERPL-MCNC: 7.1 MG/DL (ref 2.5–4.5)
PLATELET # BLD AUTO: 164 10E9/L (ref 150–450)
POTASSIUM SERPL-SCNC: 3.7 MMOL/L (ref 3.4–5.3)
RBC # BLD AUTO: 2.13 10E12/L (ref 4.4–5.9)
SODIUM SERPL-SCNC: 143 MMOL/L (ref 133–144)
SPECIMEN EXP DATE BLD: NORMAL
TRANSFUSION STATUS PATIENT QL: NORMAL
VIT B12 SERPL-MCNC: 1398 PG/ML (ref 193–986)
WBC # BLD AUTO: 10.2 10E9/L (ref 4–11)

## 2017-01-16 PROCEDURE — 20000003 ZZH R&B ICU

## 2017-01-16 PROCEDURE — 99233 SBSQ HOSP IP/OBS HIGH 50: CPT | Performed by: INTERNAL MEDICINE

## 2017-01-16 PROCEDURE — 63400005 ZZH RX 634: Performed by: INTERNAL MEDICINE

## 2017-01-16 PROCEDURE — 25000132 ZZH RX MED GY IP 250 OP 250 PS 637: Mod: GY | Performed by: INTERNAL MEDICINE

## 2017-01-16 PROCEDURE — 25000132 ZZH RX MED GY IP 250 OP 250 PS 637: Mod: GY

## 2017-01-16 PROCEDURE — 92526 ORAL FUNCTION THERAPY: CPT | Mod: GN | Performed by: SPEECH-LANGUAGE PATHOLOGIST

## 2017-01-16 PROCEDURE — A9270 NON-COVERED ITEM OR SERVICE: HCPCS | Mod: GY | Performed by: INTERNAL MEDICINE

## 2017-01-16 PROCEDURE — 25000125 ZZHC RX 250: Performed by: INTERNAL MEDICINE

## 2017-01-16 PROCEDURE — 85027 COMPLETE CBC AUTOMATED: CPT | Performed by: INTERNAL MEDICINE

## 2017-01-16 PROCEDURE — 82272 OCCULT BLD FECES 1-3 TESTS: CPT | Performed by: INTERNAL MEDICINE

## 2017-01-16 PROCEDURE — 40000257 ZZH STATISTIC CONSULT NO CHARGE VASC ACCESS

## 2017-01-16 PROCEDURE — 40000225 ZZH STATISTIC SLP WARD VISIT: Performed by: SPEECH-LANGUAGE PATHOLOGIST

## 2017-01-16 PROCEDURE — 25000128 H RX IP 250 OP 636: Performed by: INTERNAL MEDICINE

## 2017-01-16 PROCEDURE — 00000146 ZZHCL STATISTIC GLUCOSE BY METER IP

## 2017-01-16 PROCEDURE — 25000308 HC RX OP HPI UCR WEL MED 250 IP 250: Performed by: INTERNAL MEDICINE

## 2017-01-16 PROCEDURE — 94640 AIRWAY INHALATION TREATMENT: CPT

## 2017-01-16 PROCEDURE — 80069 RENAL FUNCTION PANEL: CPT | Performed by: INTERNAL MEDICINE

## 2017-01-16 PROCEDURE — 94640 AIRWAY INHALATION TREATMENT: CPT | Mod: 76

## 2017-01-16 PROCEDURE — P9040 RBC LEUKOREDUCED IRRADIATED: HCPCS | Performed by: INTERNAL MEDICINE

## 2017-01-16 PROCEDURE — 40000275 ZZH STATISTIC RCP TIME EA 10 MIN

## 2017-01-16 PROCEDURE — 90937 HEMODIALYSIS REPEATED EVAL: CPT

## 2017-01-16 PROCEDURE — 83735 ASSAY OF MAGNESIUM: CPT | Performed by: INTERNAL MEDICINE

## 2017-01-16 RX ORDER — ALBUMIN (HUMAN) 12.5 G/50ML
50 SOLUTION INTRAVENOUS
Status: DISCONTINUED | OUTPATIENT
Start: 2017-01-16 | End: 2017-01-18

## 2017-01-16 RX ORDER — ATORVASTATIN CALCIUM 10 MG/1
10 TABLET, FILM COATED ORAL DAILY
Status: DISCONTINUED | OUTPATIENT
Start: 2017-01-16 | End: 2017-01-22 | Stop reason: HOSPADM

## 2017-01-16 RX ORDER — PANTOPRAZOLE SODIUM 40 MG/1
40 TABLET, DELAYED RELEASE ORAL EVERY MORNING
Status: DISCONTINUED | OUTPATIENT
Start: 2017-01-16 | End: 2017-01-22 | Stop reason: HOSPADM

## 2017-01-16 RX ORDER — GABAPENTIN 300 MG/1
600 CAPSULE ORAL EVERY EVENING
Status: DISCONTINUED | OUTPATIENT
Start: 2017-01-16 | End: 2017-01-22 | Stop reason: HOSPADM

## 2017-01-16 RX ORDER — GABAPENTIN 300 MG/1
300 CAPSULE ORAL EVERY MORNING
Status: DISCONTINUED | OUTPATIENT
Start: 2017-01-16 | End: 2017-01-22 | Stop reason: HOSPADM

## 2017-01-16 RX ORDER — CLONAZEPAM 0.5 MG/1
0.5 TABLET ORAL
Status: DISCONTINUED | OUTPATIENT
Start: 2017-01-16 | End: 2017-01-22 | Stop reason: HOSPADM

## 2017-01-16 RX ORDER — VIT B COMP NO.3/FOLIC/C/BIOTIN 1 MG-60 MG
1 TABLET ORAL DAILY
Status: DISCONTINUED | OUTPATIENT
Start: 2017-01-16 | End: 2017-01-16 | Stop reason: RX

## 2017-01-16 RX ORDER — CODEINE PHOSPHATE AND GUAIFENESIN 10; 100 MG/5ML; MG/5ML
5-10 SOLUTION ORAL EVERY 4 HOURS PRN
Status: DISCONTINUED | OUTPATIENT
Start: 2017-01-16 | End: 2017-01-22 | Stop reason: HOSPADM

## 2017-01-16 RX ORDER — ATORVASTATIN CALCIUM 40 MG/1
40 TABLET, FILM COATED ORAL DAILY
Status: DISCONTINUED | OUTPATIENT
Start: 2017-01-16 | End: 2017-01-16

## 2017-01-16 RX ORDER — SEVELAMER CARBONATE 800 MG/1
1600 TABLET, FILM COATED ORAL
Status: DISCONTINUED | OUTPATIENT
Start: 2017-01-16 | End: 2017-01-22 | Stop reason: HOSPADM

## 2017-01-16 RX ORDER — ALBUMIN, HUMAN INJ 5% 5 %
250 SOLUTION INTRAVENOUS
Status: DISCONTINUED | OUTPATIENT
Start: 2017-01-16 | End: 2017-01-18

## 2017-01-16 RX ADMIN — HYDROMORPHONE HYDROCHLORIDE 0.5 MG: 10 INJECTION, SOLUTION INTRAMUSCULAR; INTRAVENOUS; SUBCUTANEOUS at 21:11

## 2017-01-16 RX ADMIN — HYDROMORPHONE HYDROCHLORIDE 0.5 MG: 10 INJECTION, SOLUTION INTRAMUSCULAR; INTRAVENOUS; SUBCUTANEOUS at 00:40

## 2017-01-16 RX ADMIN — CARVEDILOL 25 MG: 25 TABLET, FILM COATED ORAL at 20:13

## 2017-01-16 RX ADMIN — CARVEDILOL 25 MG: 25 TABLET, FILM COATED ORAL at 13:27

## 2017-01-16 RX ADMIN — ERYTHROPOIETIN 10000 UNITS: 10000 INJECTION, SOLUTION INTRAVENOUS; SUBCUTANEOUS at 08:24

## 2017-01-16 RX ADMIN — TAZOBACTAM SODIUM AND PIPERACILLIN SODIUM 2.25 G: 250; 2 INJECTION, SOLUTION INTRAVENOUS at 08:07

## 2017-01-16 RX ADMIN — CLOPIDOGREL 75 MG: 75 TABLET, FILM COATED ORAL at 20:13

## 2017-01-16 RX ADMIN — PANTOPRAZOLE SODIUM 40 MG: 40 TABLET, DELAYED RELEASE ORAL at 13:27

## 2017-01-16 RX ADMIN — CLONAZEPAM 0.5 MG: 0.5 TABLET ORAL at 00:04

## 2017-01-16 RX ADMIN — CALCIUM ACETATE 2001 MG: 667 CAPSULE ORAL at 20:12

## 2017-01-16 RX ADMIN — DAPSONE 100 MG: 25 TABLET ORAL at 21:11

## 2017-01-16 RX ADMIN — GABAPENTIN 600 MG: 300 CAPSULE ORAL at 20:12

## 2017-01-16 RX ADMIN — MYCOPHENOLATE MOFETIL 500 MG: 250 CAPSULE ORAL at 20:12

## 2017-01-16 RX ADMIN — ALBUTEROL SULFATE 2.5 MG: 2.5 SOLUTION RESPIRATORY (INHALATION) at 15:13

## 2017-01-16 RX ADMIN — ATORVASTATIN CALCIUM 10 MG: 10 TABLET, FILM COATED ORAL at 13:46

## 2017-01-16 RX ADMIN — ALBUTEROL SULFATE 2.5 MG: 2.5 SOLUTION RESPIRATORY (INHALATION) at 20:51

## 2017-01-16 RX ADMIN — ALBUTEROL SULFATE 2.5 MG: 2.5 SOLUTION RESPIRATORY (INHALATION) at 12:15

## 2017-01-16 RX ADMIN — SODIUM CHLORIDE SOLN NEBU 3% 4 ML: 3 NEBU SOLN at 20:51

## 2017-01-16 RX ADMIN — INSULIN GLARGINE 26 UNITS: 100 INJECTION, SOLUTION SUBCUTANEOUS at 13:53

## 2017-01-16 RX ADMIN — HEPARIN SODIUM 5000 UNITS: 5000 INJECTION, SOLUTION INTRAVENOUS; SUBCUTANEOUS at 11:05

## 2017-01-16 RX ADMIN — CALCIUM ACETATE 2001 MG: 667 CAPSULE ORAL at 14:31

## 2017-01-16 RX ADMIN — TAZOBACTAM SODIUM AND PIPERACILLIN SODIUM 2.25 G: 250; 2 INJECTION, SOLUTION INTRAVENOUS at 20:14

## 2017-01-16 RX ADMIN — GABAPENTIN 300 MG: 300 CAPSULE ORAL at 13:27

## 2017-01-16 RX ADMIN — ASPIRIN 81 MG 81 MG: 81 TABLET ORAL at 20:14

## 2017-01-16 RX ADMIN — HYDROMORPHONE HYDROCHLORIDE 0.5 MG: 10 INJECTION, SOLUTION INTRAMUSCULAR; INTRAVENOUS; SUBCUTANEOUS at 13:57

## 2017-01-16 RX ADMIN — SODIUM CHLORIDE SOLN NEBU 3% 4 ML: 3 NEBU SOLN at 04:03

## 2017-01-16 RX ADMIN — HYDROMORPHONE HYDROCHLORIDE 0.5 MG: 10 INJECTION, SOLUTION INTRAMUSCULAR; INTRAVENOUS; SUBCUTANEOUS at 19:26

## 2017-01-16 RX ADMIN — SODIUM CHLORIDE SOLN NEBU 3% 4 ML: 3 NEBU SOLN at 12:15

## 2017-01-16 RX ADMIN — SODIUM CHLORIDE SOLN NEBU 3% 4 ML: 3 NEBU SOLN at 15:13

## 2017-01-16 RX ADMIN — TAZOBACTAM SODIUM AND PIPERACILLIN SODIUM 2.25 G: 250; 2 INJECTION, SOLUTION INTRAVENOUS at 13:41

## 2017-01-16 RX ADMIN — Medication 1 CAPSULE: at 13:27

## 2017-01-16 RX ADMIN — CHLORHEXIDINE GLUCONATE 15 ML: 1.2 RINSE ORAL at 11:06

## 2017-01-16 RX ADMIN — SODIUM CHLORIDE SOLN NEBU 3% 4 ML: 3 NEBU SOLN at 07:53

## 2017-01-16 RX ADMIN — ABACAVIR 600 MG: 300 TABLET ORAL at 20:11

## 2017-01-16 RX ADMIN — LOSARTAN POTASSIUM 100 MG: 100 TABLET, FILM COATED ORAL at 21:12

## 2017-01-16 RX ADMIN — CHLORHEXIDINE GLUCONATE 15 ML: 1.2 RINSE ORAL at 20:13

## 2017-01-16 RX ADMIN — MAGNESIUM OXIDE TAB 400 MG (241.3 MG ELEMENTAL MG) 400 MG: 400 (241.3 MG) TAB at 21:11

## 2017-01-16 RX ADMIN — HEPARIN SODIUM 5000 UNITS: 5000 INJECTION, SOLUTION INTRAVENOUS; SUBCUTANEOUS at 20:13

## 2017-01-16 RX ADMIN — ALBUTEROL SULFATE 2.5 MG: 2.5 SOLUTION RESPIRATORY (INHALATION) at 07:53

## 2017-01-16 RX ADMIN — SEVELAMER CARBONATE 1600 MG: 800 TABLET, FILM COATED ORAL at 13:36

## 2017-01-16 RX ADMIN — MYCOPHENOLATE MOFETIL 500 MG: 250 CAPSULE ORAL at 13:39

## 2017-01-16 RX ADMIN — SODIUM CHLORIDE 1000 ML: 9 INJECTION, SOLUTION INTRAVENOUS at 07:59

## 2017-01-16 RX ADMIN — INSULIN ASPART 3 UNITS: 100 INJECTION, SOLUTION INTRAVENOUS; SUBCUTANEOUS at 16:45

## 2017-01-16 RX ADMIN — SODIUM CHLORIDE 250 ML: 9 INJECTION, SOLUTION INTRAVENOUS at 08:00

## 2017-01-16 RX ADMIN — HYDROMORPHONE HYDROCHLORIDE 0.5 MG: 10 INJECTION, SOLUTION INTRAMUSCULAR; INTRAVENOUS; SUBCUTANEOUS at 08:31

## 2017-01-16 ASSESSMENT — PAIN DESCRIPTION - DESCRIPTORS
DESCRIPTORS: ACHING;SORE
DESCRIPTORS: ACHING;DISCOMFORT
DESCRIPTORS: ACHING;DISCOMFORT

## 2017-01-16 NOTE — PROGRESS NOTES
[facility]  Infectious Disease Progress Note          Assessment and Plan:   IMPRESSION:    1.  A 66-year-old male, very complex medical history, known to me from prior admission, currently is admitted with chest pain, rule out coronary artery disease, now has developed some increased cough and respiratory symptoms, although chest x-ray is negative.  New acute fever in the hospital as well, question respiratory infection, although not initially obvious, no leukocytosis, not really productive sputum and again chest x-ray negative.    2.  Chronic human immunodeficiency virus infection for over 30 years, treated at [facility], most recent T cells were 3 weeks ago at which time T cells were 263 and undetectable virus, i.e., unlikely to have an opportunistic infection, is on a fairly complicated 5-drug regimen, but well controlled.    3.  Known coronary disease, does not appear he has that as an explanation for current symptoms.    4.  Diabetes mellitus.    5.  End-stage renal disease on chronic dialysis on the transplant list.    6.  Sulfa allergy.    7.  Nephrolithiasis.   8 Resp failure, infiltrate presumed pneumonia, intubated in ICU       RECOMMENDATIONS:    1.  Continue his current antiretroviral therapy, long term    2.  Extubated and looks well, Dc vanco, taper off antibiotics over next couple days             Interval History:   Extubated andawake in ICU,improved overall No + cxs sputum neg                Medications:       epoetin brittni (EPOGEN,PROCRIT) inj ESRD  10,000 Units Intravenous See Admin Instructions     pantoprazole  40 mg Oral QAM     gabapentin  300 mg Oral QAM     gabapentin  600 mg Oral QPM     B complex-C-folic acid  1 capsule Oral Daily     atorvastatin  10 mg Oral Daily     sevelamer  1,600 mg Oral TID w/meals     insulin aspart  1-12 Units Subcutaneous Q4H     albuterol  2.5 mg Nebulization Q4H While awake     insulin glargine  26 Units Subcutaneous BID     aspirin  "chewable tablet 81 mg  81 mg Oral QPM     carvedilol  25 mg Oral BID w/meals     dapsone  100 mg Oral At Bedtime     losartan  100 mg Oral At Bedtime     magnesium oxide (MAG-OX) tablet 400 mg  400 mg Oral At Bedtime     mycophenolate  500 mg Oral BID     sodium chloride  4 mL Nebulization Q4H     heparin  5,000 Units Subcutaneous BID     sodium chloride (PF)  10 mL Intracatheter Q7 Days     piperacillin-tazobactam  2.25 g Intravenous Q6H     levofloxacin  500 mg Intravenous Q48H     - MEDICATION INSTRUCTIONS for Dialysis Patients -   Does not apply See Admin Instructions     sodium chloride (PF)  3 mL Intracatheter Q8H     abacavir  600 mg Oral QPM     calcium acetate  2,001 mg Oral TID w/meals     chlorhexidine  15 mL Swish & Spit BID     clopidogrel (PLAVIX) tablet 75 mg  75 mg Oral QPM     darunavir  800 mg Oral At Bedtime     dolutegravir  50 mg Oral At Bedtime     ritonavir  100 mg Oral At Bedtime                  Physical Exam:   Blood pressure 147/75, pulse 64, temperature 97.7  F (36.5  C), temperature source Axillary, resp. rate 16, height 1.803 m (5' 11\"), weight 80.9 kg (178 lb 5.6 oz), SpO2 100 %.  Wt Readings from Last 2 Encounters:   01/16/17 80.9 kg (178 lb 5.6 oz)   11/28/16 88.905 kg (196 lb)     Vital Signs with Ranges  Temp:  [97.7  F (36.5  C)-98.9  F (37.2  C)] 97.7  F (36.5  C)  Heart Rate:  [73-88] 78  Resp:  [9-32] 16  BP: (131-184)/(59-89) 147/75 mmHg  SpO2:  [82 %-100 %] 100 %    Constitutional: extubated awake and interactive   Lungs: Congestion to auscultation bilaterally, no crackles or wheezing   Cardiovascular: Regular rate and rhythm, normal S1 and S2, and no murmur noted   Abdomen: Normal bowel sounds, soft, non-distended, non-tender   Skin: No rashes, no cyanosis, no edema   Other:           Data:   All microbiology laboratory data reviewed.  Recent Labs   Lab Test  01/16/17   0450  01/15/17   1452  01/15/17   0530   WBC  10.2  11.7*  14.1*   HGB  5.9*  6.4*  6.3*   HCT  19.6*  " 20.8*  19.9*   MCV  92  92  91   PLT  164  187  182     Recent Labs   Lab Test  01/16/17   0450  01/15/17   0530  01/12/17   0526   CR  7.56*  5.57*  7.76*     No lab results found.  Recent Labs   Lab Test  01/07/17   1150  01/05/17 2012 01/05/17   2000  05/19/16   1730  03/05/16   2248  03/05/16   2247  03/05/16   2204  03/05/16   1115  07/06/14   2330   CULT  No growth  Incorrectly ordered by PCU/Clinic Canceled, Test credited ordered sputum culture   instead.    No growth  No growth  No growth  Test canceled by physician Duplicate request Charge credited  Test canceled by physician Duplicate request Charge credited  Test canceled by physician Duplicate request Charge credited  Test canceled by physician Duplicate request Charge credited  No growth  No growth  Charge credited Specimen not received Test canceled by PCU/Clinic  No growth

## 2017-01-16 NOTE — PROGRESS NOTES
Inpatient Dialysis Progress Note            Assessment and Plan:   1.  ESKD. Stable run.  Good access function.  3 KG removed.  Run has been extended by an hour as we are anticipating the arrival of a second unit of blood from the [facility].  He is a difficult cross match.   Anticipate next run Wed.      2.  Anemia.  Cause of falling hgb uncertain.  Iron stores are not low. If anything they are high.  Folate and B12 are not low.  Haptoglobin is not low so hemolysis seems unlikely.  His stool output is black.  GI losses ?  Need hemoccult stool.   Perhaps life span of transfused RBC is short ?  He is on Epogen 10K units.      3.  HTN. Reasonable control on carvedilol 25 mg BID and losartan 100 mg daily.    4.  FEN. K 3.7 on K3 bath.  Phos 7.1.  He is on phoslo 3 TID c meals.  Ca 10.1.  I will add renvela 2 TID c meals.      5. Sepsis/respiratory failure:  Improved.  Specific organism not found.          Interval History:   Feeling fine.  Not SOB.  Weak after long ICU stay.  HGB dropped to 5.9 from  7.9 4 days ago.  Output from [facility] is black.        Dialysis Parameters:     Wt Readings from Last 4 Encounters:   01/16/17 80.9 kg (178 lb 5.6 oz)   11/28/16 88.905 kg (196 lb)   11/28/16 86.183 kg (190 lb)   11/07/16 86.183 kg (190 lb)     I/O last 3 completed shifts:  In: 758.34 [P.O.:60; I.V.:698.34]  Out: -   BP Readings from Last 3 Encounters:   01/16/17 147/75   11/28/16 120/70   08/19/16 140/68       Routine, ONE TIME, Starting today For 1 Occurrences  Weight Loss (kg): ~3  Dialysis Temp: 36.5  C  Access Device: AVF  Access Site: L arm  Dialyzer: Revaclear  Dialysis Bath: K 3  Blood Flow Rate (mL/min): 400  Total Treatment Time (hrs): 4.5  Heparin:  none         Medications and Allergies:   Reviewed in EPIC      epoetin brittni (EPOGEN,PROCRIT) inj ESRD  10,000 Units Intravenous See Admin Instructions     pantoprazole  40 mg Oral QAM     gabapentin  300 mg Oral QAM     gabapentin  600 mg Oral QPM     B  complex-C-folic acid  1 capsule Oral Daily     insulin aspart  1-12 Units Subcutaneous Q4H     albuterol  2.5 mg Nebulization Q4H While awake     insulin glargine  26 Units Subcutaneous BID     aspirin chewable tablet 81 mg  81 mg Oral QPM     carvedilol  25 mg Oral BID w/meals     dapsone  100 mg Oral At Bedtime     losartan  100 mg Oral At Bedtime     magnesium oxide (MAG-OX) tablet 400 mg  400 mg Oral At Bedtime     mycophenolate  500 mg Oral BID     sodium chloride  4 mL Nebulization Q4H     heparin  5,000 Units Subcutaneous BID     sodium chloride (PF)  10 mL Intracatheter Q7 Days     piperacillin-tazobactam  2.25 g Intravenous Q6H     vancomycin place koenig - receiving intermittent dosing  1 each Does not apply See Admin Instructions     levofloxacin  500 mg Intravenous Q48H     - MEDICATION INSTRUCTIONS for Dialysis Patients -   Does not apply See Admin Instructions     sodium chloride (PF)  3 mL Intracatheter Q8H     abacavir  600 mg Oral QPM     calcium acetate  2,001 mg Oral TID w/meals     chlorhexidine  15 mL Swish & Spit BID     clopidogrel (PLAVIX) tablet 75 mg  75 mg Oral QPM     darunavir  800 mg Oral At Bedtime     dolutegravir  50 mg Oral At Bedtime     ritonavir  100 mg Oral At Bedtime     sodium chloride 0.9%, albumin human, albumin human, - MEDICATION INSTRUCTIONS -, clonazePAM (klonoPIN) tablet 0.5 mg, magnesium hydroxide, guaiFENesin-codeine, glucose **OR** dextrose **OR** glucagon, hypromellose-dextran, hydrALAZINE, naloxone, IV fluid REPLACEMENT ONLY, HYDROmorphone, lidocaine 4%, heparin lock flush, fentaNYL, magnesium sulfate, midazolam, sodium chloride (PF), lidocaine, lidocaine 4%, sodium chloride (PF), HOLD MEDICATION, hydrocortisone, terbinafine, bisacodyl, ondansetron **OR** ondansetron     Allergies   Allergen Reactions     Lisinopril      Sulfa Drugs               Labs:     [facility]  Recent Labs  Lab 01/16/17  0450 01/15/17  0530 01/12/17  0526 01/11/17  0446    142 137 137    POTASSIUM 3.7 3.5 4.1 4.0   CHLORIDE 99 100 94 96   PRABHA 10.1 9.3 9.2 9.0   CO2 29 29 29 29   * 82* 65* 42*   CR 7.56* 5.57* 7.76* 6.05*   * 109* 240* 211*     CBC  Recent Labs  Lab 01/16/17  0450 01/15/17  1452 01/15/17  0530 01/12/17  0526   WBC 10.2 11.7* 14.1* 9.3   HGB 5.9* 6.4* 6.3* 7.9*   HCT 19.6* 20.8* 19.9* 24.6*   MCV 92 92 91 91    187 182 154     Lab Results   Component Value Date    AST 66* 01/12/2017    * 01/12/2017    ALKPHOS 88 01/12/2017    BILITOTAL 0.8 01/12/2017    BILICONJ 0.0 07/06/2014            Physical Exam:   Vitals were reviewed in [facility]    Wt Readings from Last 3 Encounters:   01/16/17 80.9 kg (178 lb 5.6 oz)   11/28/16 88.905 kg (196 lb)   11/28/16 86.183 kg (190 lb)       Intake/Output Summary (Last 24 hours) at 01/16/17 1147  Last data filed at 01/16/17 0800   Gross per 24 hour   Intake 958.34 ml   Output      0 ml   Net 958.34 ml       GENERAL APPEARANCE: pleasant, no distress, a & o  HEENT:  Eyes/ears/nose grossly normal, neck supple  RESP: few soft gurgles  CV: regular rate and rhythm, normal S1 S2, no murmur, click or rub   ABDOMEN: soft, nontender, bowel sounds normal  EXTREMITIES/SKIN: no edema, no rashes or lesions     Pt seen on dialysis.  Stable run.  Good BFR.      Attestation:  I have reviewed today's vital signs, notes, medications, labs and imaging.     Rudolph Wilkes MD  [facility] - Nephrology  [phone]

## 2017-01-16 NOTE — PLAN OF CARE
Problem: Goal Outcome Summary  Goal: Goal Outcome Summary  Outcome: Improving  Neuro: oriented x4. Fatigued today after dialysis. Refused to get in chair. Moves all ext  Cards: SR. BP stable  Resp: 2L NC. Lungs coarse. Cough decreased.   GI: rectal tube in place. Occult stool sent to lab, positive results. Paged to hospitalist   : dialysis today.  No urine  Pain: occ pain in buttocks/rectum. Dilaudid iv  Skin: intact  Lines: PICC  Drips: none  Labs: hgb 5.9 today. Pt received 2 units of blood during dialysis run  Plan: cont to monitor hgb. ID following pt. Adjust antibiotics.

## 2017-01-16 NOTE — PLAN OF CARE
Problem: Goal Outcome Summary  Goal: Goal Outcome Summary  Outcome: No Change  ICU End of Shift Summary.  For vital signs and complete assessments, please see documentation flowsheets.     Pertinent assessments: Afebrile. C/o scrotal/buttock pain (excoriated) prn dilaudid given with good outcomes, barrier cream applied.   Major Shift Events: Up to chair for first time with A2 with gaitbelt & walker. Spent most of day in chair. Desat to mid 80's with movement back to bed. Started PO intake, needs reminders to take slow and tuck chin. PT to see pt, patient refused today. Will do tomorrow.   Plan (Upcoming Events): Dialysis with blood transfusion tomorrow (Monday). No unnecessary lab draws per Dr. Crump. PT to work with pt tomorrow.  Discharge/Transfer Needs:     Bedside Shift Report Completed   Bedside Safety Check Completed

## 2017-01-16 NOTE — PLAN OF CARE
Problem: Goal Outcome Summary  Goal: Goal Outcome Summary  SLP: Pt seen to f/u for swallowing s/p dialysis run. Pt noted to be significantly fatigued and only took small amounts of PO w/ ST today.  Pt tolerated thin liquids via cup w/o outward s/sx of aspiration during session.  Also tolerated taking multiple medications in apple sauce w/o difficulties.  Medical team adv diet to regular dialysis diet w/ thin liquids. Recommend continue with this diet as RN indicated Pt did well with solids and liquids this am.  ST will plan to f/u for diet tolerance in am. Pt to continue to take small sips/bites, be fully alert and upright for all PO.  If significant fatigue noted, would hold PO.

## 2017-01-16 NOTE — PLAN OF CARE
"Problem: Goal Outcome Summary  Goal: Goal Outcome Summary  ICU End of Shift Summary.  For vital signs and complete assessments, please see documentation flowsheets.     Pertinent assessments: Pt w/ slowly increasing BP and c/o's scrotal pain from swelling and excoriation.  Major Shift Events:  Scheduled BP meds given to pt as ordered, but BP still up.  Plan (Upcoming Events): Will give pain med to decrease \"moderate to severe scrotal pain\" then re evaluate BP and pain.  Discharge/Transfer Needs:     Bedside Shift Report Completed   Bedside Safety Check Completed                 "

## 2017-01-16 NOTE — CONSULTS
CLINICAL NUTRITION SERVICES - REASSESSMENT NOTE      Recommendations Ordered by Registered Dietitian (RD):   -Dialysis diet with textures / liquids per SLP  -Diet appropriate nutritional supplements with meals TID  -Room service appropriate with assist  -Renal MVI         EVALUATION OF PROGRESS TOWARD GOALS   -Enteral Nutrition intake - Average intake / adequacy and tolerance (stooling, biochemical review, weight trends)  Update/Evaluation: NGT removed during extubated. As a result, EN discontinued.     Biochemical review:  -Phos - 7.1 mg/dL (H)  -Mg++ - 2.5 mg/dL (H)  -K+ - 3.7 mmol/L (wnl)  -BGs -  mg/dl (appropriate), 218 mg/dL (H), 237 mg/dL (H)    Patient with BMs on 1/14 (x3) and 1/15 (x2)    Weight down ~5.3 kg since admission; however patient +5 L since admission.     -Procedures - Extubation  Update/Evaluation: Patient successfully extubated on 1/14 @ 1330.       Dosing Weight: 85 kg (dry weight)    ASSESSED NUTRITION NEEDS:  Estimated Energy Needs: 8044-0056 kcals (25-30 Kcal/Kg)  Justification: maintenance  Estimated Protein Needs: 102-170 grams protein (1.2-2 g pro/Kg)  Justification: hypercatabolism with critical illness and dialysis  Estimated Fluid Needs: 2059-1093  mL (1 mL/Kcal)  Justification: maintenance and per provider pending fluid status      NEW FINDINGS:   -1/15 - SLP evaluation --> recommended diet advancement to DD1 + NTL; however diet remains dialysis diet with no texture/liquid modifications     Previous Goals:   EN to provide % of estimated nutrition needs   Evaluation: Not met    Previous Nutrition Diagnosis:   Predicted suboptimal nutrient intake (protein-energy) related to reliance on EN to meet estimated nutrition needs with potential for interruption secondary to ICU LOS  Evaluation: No change - updated       MALNUTRITION: (assessed 1/9/2017)  % Weight Loss:  None noted  % Intake:  Decreased intake does not meet criteria for malnutrition   Subcutaneous Fat Loss:  None  observed  Muscle Loss:  None observed  Fluid Retention:  None noted    Malnutrition Diagnosis: Patient does not meet two of the above criteria necessary for diagnosing malnutrition    CURRENT NUTRITION DIAGNOSIS  Predicted suboptimal nutrient intake (protein-energy) related to increased nutrient needs secondary to HD     INTERVENTIONS  Recommendations / Nutrition Prescription  1. Dialysis diet with textures / liquids per SLP  2. Nepro with meals TID   3. Renal MVI+mineral     Implementation  Medical Food Supplement - Discussed benefit of nutritional supplement with patient. Ordered diet appropriate nutritional supplement with meals TID     Collaboration and Referral of Nutrition care - Discussed patient during interdisciplinary rounds. Also spoke with RN regarding above recommendations/interventions.     Goals  Pt to consume >/=75% of meals TID + 2 supplements    MONITORING AND EVALUATION:  -Food intake and supplements - Monitor adequacy of intake (% of meals, supplements)  -Weight - Trends     Marianne Mendoza RD, LD  Clinical Dietitian  3rd Floor/ICU Pager: [phone]  All Other Floors Pager: [phone]  Weekend/Holiday Pager: [phone]

## 2017-01-16 NOTE — PROGRESS NOTES
"[facility]  Hospitalist Progress Note  Marquez Tolliver,  01/16/2017    Reason for Stay (Diagnosis): Pneumonia         Assessment and Plan:      Summary of Stay: Donald Raza is a 66 year old male admitted on 1/3/2017 with chest pain     Problem List:    1. Chest pain with known coronary artery disease.  Cardiology consult appreciated. Angio done 1/4/17.  No significant lesions noted.  Continue ASA, Coreg, Plavix, Cozaar.  Restart Lipitor at 10 mg/day.  2. Pneumonia.  Zosyn, Vanco, Levaquin.  ID following.  3. End stage kidney failure.  Hemodialysis per Nephrology.  Continue Phoslo.  4. Acute on chronic anemia.  Hgb 5.9 this morning.  Transfuse 2 units PRBC's.  Monitor.  5. HIV.  Continue Ziagen, Prezista, Tivicay, and Norvir.  Dapsone.  6. HTN.  Continue Cozaar, Coreg.  7. Diabetes Mellitus.  Continue Lantus, Novolog SSI.  8. Weakness.  PT.    DVT Prophylaxis: Heparin SQ  Code Status: Full Code  Discharge Dispo: PT consult.  Estimated Disch Date / # of Days until Disch: 2+        Interval History (Subjective):      Feels weak.  No CP, SOB, F/C, N/V, or diarrhea.                  Physical Exam:      Last Vital Signs:  /75 mmHg  Pulse 64  Temp(Src) 98.2  F (36.8  C) (Oral)  Resp 16  Ht 1.803 m (5' 11\")  Wt 80.9 kg (178 lb 5.6 oz)  BMI 24.89 kg/m2  SpO2 100%    Gen:  NAD, A&Ox3.  Eyes:  PERRL, sclera anicteric.  OP:  MMM, no lesions.  Neck:  Supple.  CV:  Regular, no murmurs.  Lung:  CTA b/l, normal effort.  Ab:  +BS, soft.  Skin:  Warm, dry to touch.  No rash.  Ext:  No pitting edema LE b/l.           Medications:      All current medications were reviewed with changes reflected in problem list.         Data:      All new lab and imaging data was reviewed.   Labs:    Recent Labs  Lab 01/16/17  0450      POTASSIUM 3.7   CHLORIDE 99   CO2 29   ANIONGAP 15*   *   *   CR 7.56*   GFRESTIMATED 7*   GFRESTBLACK 9*   PRABHA 10.1       Recent Labs  Lab 01/16/17  0450   WBC " 10.2   HGB 5.9*   HCT 19.6*   MCV 92         Imaging:   No results found for this or any previous visit (from the past 24 hour(s)).

## 2017-01-16 NOTE — PROGRESS NOTES
POTASSIUM          Date        Value        Ref Range        Status          01/16/2017        3.7mmol/L      3.4 - 5.3 mmol/L        Final            HGB    5.9 g/dl    1/16/2017  Weight: 80.9 kg  1/16/2017      Patient dialyzed for 4.5 hrs. on a 3 K bath with a net fluid removal of 3 L.  A BFR of 450 ml/min was obtained via a left upper arm AV fistula using 15 gauge needles.         2 units PRBCs given for low HGB.  Total heparin received during the treatment: 0 units. Sites held x 12 min then drsgs applied.  Meds  Given: Epogen, 10,000u. Complications: none.  Procedure and ESRD teaching done.  See flowsheet in EPIC for further details and post assessment.  Tx extended X 1 hour to accommodater PRBC administration.   Machine water alarm in place and functioning.  Consent for dialysis signed.  1/7/2017  Vascular Access: Aseptic prep done for both on/off.  HEPATITIS B SURFACE ANTIGEN: negative DATE: 12/20/16  HEPATITIS B SURFACE ANTIBODY: unknown  Chlorine/Chloramine water system checked every 4 hours.  Report given to:Karen Bhatt, HERRERA Ann, [facility]

## 2017-01-17 ENCOUNTER — APPOINTMENT (OUTPATIENT)
Dept: SPEECH THERAPY | Facility: CLINIC | Age: 67
DRG: 286 | End: 2017-01-17
Payer: MEDICARE

## 2017-01-17 LAB
ALBUMIN SERPL-MCNC: 2.6 G/DL (ref 3.4–5)
ALP SERPL-CCNC: 54 U/L (ref 40–150)
ALT SERPL W P-5'-P-CCNC: 48 U/L (ref 0–70)
ANION GAP SERPL CALCULATED.3IONS-SCNC: 10 MMOL/L (ref 3–14)
AST SERPL W P-5'-P-CCNC: 29 U/L (ref 0–45)
BILIRUB SERPL-MCNC: 0.7 MG/DL (ref 0.2–1.3)
BLOOD BANK CMNT PATIENT-IMP: NORMAL
BUN SERPL-MCNC: 43 MG/DL (ref 7–30)
CALCIUM SERPL-MCNC: 9.3 MG/DL (ref 8.5–10.1)
CHLORIDE SERPL-SCNC: 99 MMOL/L (ref 94–109)
CO2 SERPL-SCNC: 31 MMOL/L (ref 20–32)
CREAT SERPL-MCNC: 4.35 MG/DL (ref 0.66–1.25)
DAT IGG-SP REAG RBC-IMP: NORMAL
ERYTHROCYTE [DISTWIDTH] IN BLOOD BY AUTOMATED COUNT: 16.3 % (ref 10–15)
GFR SERPL CREATININE-BSD FRML MDRD: 14 ML/MIN/1.7M2
GLUCOSE BLDC GLUCOMTR-MCNC: 114 MG/DL (ref 70–99)
GLUCOSE BLDC GLUCOMTR-MCNC: 126 MG/DL (ref 70–99)
GLUCOSE BLDC GLUCOMTR-MCNC: 153 MG/DL (ref 70–99)
GLUCOSE BLDC GLUCOMTR-MCNC: 177 MG/DL (ref 70–99)
GLUCOSE BLDC GLUCOMTR-MCNC: 76 MG/DL (ref 70–99)
GLUCOSE BLDC GLUCOMTR-MCNC: 89 MG/DL (ref 70–99)
GLUCOSE SERPL-MCNC: 77 MG/DL (ref 70–99)
HCT VFR BLD AUTO: 26.8 % (ref 40–53)
HGB BLD-MCNC: 8.4 G/DL (ref 13.3–17.7)
MCH RBC QN AUTO: 28.2 PG (ref 26.5–33)
MCHC RBC AUTO-ENTMCNC: 31.3 G/DL (ref 31.5–36.5)
MCV RBC AUTO: 90 FL (ref 78–100)
PLATELET # BLD AUTO: 198 10E9/L (ref 150–450)
POTASSIUM SERPL-SCNC: 3.5 MMOL/L (ref 3.4–5.3)
PROT SERPL-MCNC: 7.3 G/DL (ref 6.8–8.8)
RBC # BLD AUTO: 2.98 10E12/L (ref 4.4–5.9)
SODIUM SERPL-SCNC: 140 MMOL/L (ref 133–144)
WBC # BLD AUTO: 10.5 10E9/L (ref 4–11)

## 2017-01-17 PROCEDURE — 25000132 ZZH RX MED GY IP 250 OP 250 PS 637: Mod: GY | Performed by: INTERNAL MEDICINE

## 2017-01-17 PROCEDURE — A9270 NON-COVERED ITEM OR SERVICE: HCPCS | Mod: GY | Performed by: INTERNAL MEDICINE

## 2017-01-17 PROCEDURE — 85027 COMPLETE CBC AUTOMATED: CPT | Performed by: INTERNAL MEDICINE

## 2017-01-17 PROCEDURE — 25000308 HC RX OP HPI UCR WEL MED 250 IP 250: Performed by: INTERNAL MEDICINE

## 2017-01-17 PROCEDURE — 92526 ORAL FUNCTION THERAPY: CPT | Mod: GN

## 2017-01-17 PROCEDURE — 94640 AIRWAY INHALATION TREATMENT: CPT | Mod: 76

## 2017-01-17 PROCEDURE — 25000125 ZZHC RX 250: Performed by: INTERNAL MEDICINE

## 2017-01-17 PROCEDURE — 94640 AIRWAY INHALATION TREATMENT: CPT

## 2017-01-17 PROCEDURE — 80053 COMPREHEN METABOLIC PANEL: CPT | Performed by: INTERNAL MEDICINE

## 2017-01-17 PROCEDURE — 40000225 ZZH STATISTIC SLP WARD VISIT

## 2017-01-17 PROCEDURE — 12000011 ZZH R&B MS OVERFLOW

## 2017-01-17 PROCEDURE — 99232 SBSQ HOSP IP/OBS MODERATE 35: CPT | Performed by: INTERNAL MEDICINE

## 2017-01-17 PROCEDURE — 40000275 ZZH STATISTIC RCP TIME EA 10 MIN

## 2017-01-17 PROCEDURE — 00000146 ZZHCL STATISTIC GLUCOSE BY METER IP

## 2017-01-17 PROCEDURE — 25000132 ZZH RX MED GY IP 250 OP 250 PS 637: Mod: GY

## 2017-01-17 RX ADMIN — GUAIFENESIN AND CODEINE PHOSPHATE 10 ML: 100; 10 SOLUTION ORAL at 00:26

## 2017-01-17 RX ADMIN — ALBUTEROL SULFATE 2.5 MG: 2.5 SOLUTION RESPIRATORY (INHALATION) at 23:53

## 2017-01-17 RX ADMIN — ASPIRIN 81 MG 81 MG: 81 TABLET ORAL at 19:57

## 2017-01-17 RX ADMIN — GABAPENTIN 300 MG: 300 CAPSULE ORAL at 09:01

## 2017-01-17 RX ADMIN — HYDROMORPHONE HYDROCHLORIDE 0.5 MG: 10 INJECTION, SOLUTION INTRAMUSCULAR; INTRAVENOUS; SUBCUTANEOUS at 00:32

## 2017-01-17 RX ADMIN — CALCIUM ACETATE 2001 MG: 667 CAPSULE ORAL at 19:57

## 2017-01-17 RX ADMIN — INSULIN GLARGINE 26 UNITS: 100 INJECTION, SOLUTION SUBCUTANEOUS at 21:43

## 2017-01-17 RX ADMIN — RITONAVIR 100 MG: 100 TABLET, FILM COATED ORAL at 21:45

## 2017-01-17 RX ADMIN — CLOPIDOGREL 75 MG: 75 TABLET, FILM COATED ORAL at 19:57

## 2017-01-17 RX ADMIN — INSULIN GLARGINE 26 UNITS: 100 INJECTION, SOLUTION SUBCUTANEOUS at 08:26

## 2017-01-17 RX ADMIN — CARVEDILOL 25 MG: 25 TABLET, FILM COATED ORAL at 19:57

## 2017-01-17 RX ADMIN — CALCIUM ACETATE 2001 MG: 667 CAPSULE ORAL at 11:57

## 2017-01-17 RX ADMIN — LEVOFLOXACIN 500 MG: 5 INJECTION, SOLUTION INTRAVENOUS at 00:22

## 2017-01-17 RX ADMIN — Medication 1 CAPSULE: at 09:02

## 2017-01-17 RX ADMIN — TAZOBACTAM SODIUM AND PIPERACILLIN SODIUM 2.25 G: 250; 2 INJECTION, SOLUTION INTRAVENOUS at 17:05

## 2017-01-17 RX ADMIN — ABACAVIR 600 MG: 300 TABLET ORAL at 19:58

## 2017-01-17 RX ADMIN — DAPSONE 100 MG: 25 TABLET ORAL at 21:44

## 2017-01-17 RX ADMIN — SEVELAMER CARBONATE 1600 MG: 800 TABLET, FILM COATED ORAL at 11:57

## 2017-01-17 RX ADMIN — PANTOPRAZOLE SODIUM 40 MG: 40 TABLET, DELAYED RELEASE ORAL at 09:02

## 2017-01-17 RX ADMIN — ALBUTEROL SULFATE 2.5 MG: 2.5 SOLUTION RESPIRATORY (INHALATION) at 07:55

## 2017-01-17 RX ADMIN — INSULIN ASPART 1 UNITS: 100 INJECTION, SOLUTION INTRAVENOUS; SUBCUTANEOUS at 11:58

## 2017-01-17 RX ADMIN — SODIUM CHLORIDE SOLN NEBU 3% 4 ML: 3 NEBU SOLN at 16:20

## 2017-01-17 RX ADMIN — SODIUM CHLORIDE SOLN NEBU 3% 4 ML: 3 NEBU SOLN at 07:55

## 2017-01-17 RX ADMIN — SEVELAMER CARBONATE 1600 MG: 800 TABLET, FILM COATED ORAL at 17:05

## 2017-01-17 RX ADMIN — MYCOPHENOLATE MOFETIL 500 MG: 250 CAPSULE ORAL at 09:01

## 2017-01-17 RX ADMIN — GABAPENTIN 600 MG: 300 CAPSULE ORAL at 21:44

## 2017-01-17 RX ADMIN — SODIUM CHLORIDE SOLN NEBU 3% 4 ML: 3 NEBU SOLN at 19:59

## 2017-01-17 RX ADMIN — HYDRALAZINE HYDROCHLORIDE 10 MG: 20 INJECTION INTRAMUSCULAR; INTRAVENOUS at 21:43

## 2017-01-17 RX ADMIN — ALBUTEROL SULFATE 2.5 MG: 2.5 SOLUTION RESPIRATORY (INHALATION) at 13:04

## 2017-01-17 RX ADMIN — SODIUM CHLORIDE SOLN NEBU 3% 4 ML: 3 NEBU SOLN at 23:53

## 2017-01-17 RX ADMIN — ATORVASTATIN CALCIUM 10 MG: 10 TABLET, FILM COATED ORAL at 09:01

## 2017-01-17 RX ADMIN — TAZOBACTAM SODIUM AND PIPERACILLIN SODIUM 2.25 G: 250; 2 INJECTION, SOLUTION INTRAVENOUS at 23:32

## 2017-01-17 RX ADMIN — TAZOBACTAM SODIUM AND PIPERACILLIN SODIUM 2.25 G: 250; 2 INJECTION, SOLUTION INTRAVENOUS at 05:19

## 2017-01-17 RX ADMIN — SEVELAMER CARBONATE 1600 MG: 800 TABLET, FILM COATED ORAL at 09:01

## 2017-01-17 RX ADMIN — LOSARTAN POTASSIUM 100 MG: 100 TABLET, FILM COATED ORAL at 21:44

## 2017-01-17 RX ADMIN — SODIUM CHLORIDE SOLN NEBU 3% 4 ML: 3 NEBU SOLN at 13:04

## 2017-01-17 RX ADMIN — TAZOBACTAM SODIUM AND PIPERACILLIN SODIUM 2.25 G: 250; 2 INJECTION, SOLUTION INTRAVENOUS at 12:30

## 2017-01-17 RX ADMIN — HEPARIN SODIUM 5000 UNITS: 5000 INJECTION, SOLUTION INTRAVENOUS; SUBCUTANEOUS at 09:02

## 2017-01-17 RX ADMIN — ALBUTEROL SULFATE 2.5 MG: 2.5 SOLUTION RESPIRATORY (INHALATION) at 19:59

## 2017-01-17 RX ADMIN — MYCOPHENOLATE MOFETIL 500 MG: 250 CAPSULE ORAL at 21:44

## 2017-01-17 RX ADMIN — HEPARIN SODIUM 5000 UNITS: 5000 INJECTION, SOLUTION INTRAVENOUS; SUBCUTANEOUS at 21:44

## 2017-01-17 RX ADMIN — MAGNESIUM OXIDE TAB 400 MG (241.3 MG ELEMENTAL MG) 400 MG: 400 (241.3 MG) TAB at 23:32

## 2017-01-17 RX ADMIN — CARVEDILOL 25 MG: 25 TABLET, FILM COATED ORAL at 09:02

## 2017-01-17 RX ADMIN — CALCIUM ACETATE 2001 MG: 667 CAPSULE ORAL at 09:01

## 2017-01-17 RX ADMIN — ALBUTEROL SULFATE 2.5 MG: 2.5 SOLUTION RESPIRATORY (INHALATION) at 16:20

## 2017-01-17 RX ADMIN — DARUNAVIR 800 MG: 800 TABLET, FILM COATED ORAL at 21:45

## 2017-01-17 RX ADMIN — INSULIN GLARGINE 26 UNITS: 100 INJECTION, SOLUTION SUBCUTANEOUS at 00:21

## 2017-01-17 RX ADMIN — HYDROMORPHONE HYDROCHLORIDE 0.5 MG: 10 INJECTION, SOLUTION INTRAMUSCULAR; INTRAVENOUS; SUBCUTANEOUS at 10:40

## 2017-01-17 NOTE — PROGRESS NOTES
Respiratory Therapy-    Patient seen for scheduled nebulizers. Patient has congested mostly non productive cough. Able to use the yankauer when needed. Breath sounds diminished and coarse at times. Will continue to monitor patient.     Leilani Snider , RT  January 17, 2017 5:59 PM

## 2017-01-17 NOTE — CONSULTS
GASTROENTEROLOGY CONSULTATION      Donald Raza  [id number] [facility] [id number]  66 year old male     Admission Date/Time: 1/3/2017  [facility]  Referring / Attending Physician:  Dr. Tolliver     We were asked to see the patient in consultation by Dr. Tolliver for evaluation of anemia.        HPI:  Donald Raza is a 66 year old male with complex medical history including coronary artery disease (status post multiple coronary stents most recent drug-eluting stent 8/2016), ESRD on hemodialysis, HIV, DM, chronic anemia who was admitted January 3 with chest pain found to have pneumonia.    Patient denies that he is experiencing any abdominal pain.  He has been watching his stools and denies any black or bloody stool.  No vomiting or hematemesis.  The patient has gone through GI evaluation for anemia in the past.  He had a colonoscopy through the [facility] system in May 2012.  There was one 9 mm polyp in the transverse colon that was removed.  Follow-up was recommended for surveillance however patient required cardiac stenting and this procedure was delayed.  No recent upper endoscopy on file.  Patient believes he had one years ago.    Gastroenterology was asked to consult on a dropping hemoglobin.  The patient's baseline hemoglobin is between seven and eight given his multiple comorbidities.  A hemoglobin checked yesterday morning was 5.9.  A fecal occult blood was checked and found to be positive.        PAST MEDICAL HISTORY:  Patient Active Problem List    Diagnosis Date Noted     Bilateral pneumonia 01/07/2017     Priority: High     Respiratory failure (H) 01/06/2017     Priority: Medium     Anemia 01/05/2017     Priority: Medium     Dizziness 05/29/2016     Priority: Medium     Bradycardia 05/28/2016     Priority: Medium     Hemodialysis-associated hypotension 05/22/2016     Priority: Medium     Hypotension, unspecified hypotension type 05/22/2016      Priority: Medium     Hyperkalemia 05/19/2016     Priority: Medium     ESRD (end stage renal disease) on dialysis (H) 05/06/2016     Priority: Medium     Pulmonary hypertension (H) 05/06/2016     Priority: Medium     Dilated aortic root (H) 05/06/2016     Priority: Medium     ACS (acute coronary syndrome) (H) 05/02/2016     Priority: Medium     Unstable angina (H) 03/04/2016     Priority: Medium     Coronary artery disease      Priority: Medium     12/2015 - Successful angioplasty and stenting (2.25 x 8 mm Promus premier drug-eluting stent) of the ostium of the first major diagonal which is adjacent to the previously placed proximal LAD stent  2005 - VANITA x2 to LAD,         NSTEMI (non-ST elevated myocardial infarction) (H) 12/03/2015     Priority: Medium     Chest pain 06/17/2015     Priority: Medium     CAD S/P percutaneous coronary angioplasty 06/15/2015     Priority: Medium     Fever 07/07/2014     Priority: Medium     Organ transplant candidate 12/05/2012     Priority: Medium     Type 2 diabetes mellitus (H)      Priority: Medium     Human immunodeficiency virus (HIV) disease (HCC)      Priority: Medium     Allergic rhinitis      Priority: Medium     Problem list name updated by automated process. 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       MEDICATIONS:   Prior to Admission medications    Medication Sig Start Date End Date Taking? Authorizing Provider   insulin lispro (HUMALOG KWIKPEN) 100 UNIT/ML injection Inject Subcutaneous 3 times daily (before meals) Sliding scale - 2 units per 50 over 150   Yes Unknown, Entered By History   Multiple  Vitamins-Minerals (DIALYVITE 800/ULTRA D) TABS Take 1 tablet by mouth daily   Yes Unknown, Entered By History   Omega-3 Fatty Acids (OMEGA-3 FISH OIL PO) Take 2 g by mouth 2 times daily (with meals)   Yes Unknown, Entered By History   mycophenolate (CELLCEPT - GENERIC EQUIVALENT) 500 MG tablet Take 500 mg by mouth 2 times daily On an empty stomach 11/20/16  Yes Reported, Patient   isosorbide mononitrate (IMDUR) 30 MG 24 hr tablet Take 2 tablets (60 mg) by mouth every evening 8/19/16  Yes Glenna Fernández MD   hydrALAZINE (APRESOLINE) 50 MG tablet Take 0.5 tablets (25 mg) by mouth 3 times daily 8/19/16  Yes Glenna Fernández MD   losartan (COZAAR) 100 MG tablet Take 1 tablet (100 mg) by mouth At Bedtime 8/19/16  Yes Glenna Fernández MD   carvedilol (COREG) 12.5 MG tablet Take 1 tablet (12.5 mg) by mouth 2 times daily (with meals) 8/19/16  Yes Glenna Fernández MD   amLODIPine (NORVASC) 5 MG tablet Take 1 tablet (5 mg) by mouth At Bedtime 8/19/16  Yes Glenna Fernández MD   gabapentin (NEURONTIN) 300 MG capsule Take 600 mg by mouth every evening   Yes Unknown, Entered By History   insulin glargine (LANTUS) 100 UNIT/ML PEN Inject 50 Units Subcutaneous every morning   Yes Unknown, Entered By History   insulin lispro (HUMALOG KWIKPEN) 100 UNIT/ML soln Inject 15 Units Subcutaneous 3 times daily (before meals)   Yes Unknown, Entered By History   nystatin (MYCOSTATIN) cream Apply topically daily as needed for dry skin   Yes Reported, Patient   imiquimod (ALDARA) 5 % cream Apply topically as needed   Yes Reported, Patient   DOXERCALCIFEROL IV Inject 6 mcg into the vein three times a week   Yes Reported, Patient   ASPIRIN PO Take 81 mg by mouth every evening    Yes Unknown, Entered By History   CLONAZEPAM PO Take 0.5 mg by mouth nightly as needed for anxiety (restless legs)   Yes Unknown, Entered By History   CLOPIDOGREL BISULFATE PO Take 75 mg by mouth every evening    Yes Unknown, Entered By History   abacavir (ZIAGEN) 300 MG tablet  "Take 600 mg by mouth every evening  5/27/16  Yes Abstract, Provider   calcium acetate (PHOSLO) 667 MG CAPS 2,001 mg 3 times daily (with meals) Take 3 capsules three times a day with meals 5/27/16  Yes Abstract, Provider   chlorhexidine (CHLORHEXIDINE) 0.12 % solution Rinse and gargle 15 ml by mouth twice a day as directed. 5/27/16  Yes Abstract, Provider   hydrocortisone (ANUCORT-HC) 25 MG suppository Place 25 mg rectally 2 times daily as needed  5/27/16  Yes Abstract, Provider   terbinafine (LAMISIL AT) 1 % cream Apply topically 2 times daily as needed  5/27/16  Yes Abstract, Provider   atorvastatin (LIPITOR) 40 MG tablet Take 40 mg by mouth At Bedtime   Yes Unknown, Entered By History   epoetin brittni (EPOGEN,PROCRIT) 81626 UNIT/ML injection Inject 11,000 Units Subcutaneous three times a week WITH DIALYSIS   Yes Unknown, Entered By History   iron sucrose (VENOFER) 20 MG/ML injection Inject 50 mg into the vein once a week WIth dialysis   Yes Unknown, Entered By History   MAGNESIUM OXIDE PO Take 400 mg by mouth At Bedtime   Yes Unknown, Entered By History   dolutegravir (TIVICAY) 50 MG tablet Take 50 mg by mouth At Bedtime   Yes Unknown, Entered By History   nitroglycerin (NITROSTAT) 0.4 MG SL tablet One tablet under the tongue every 5 minutes if needed for chest pain. May repeat every 5 minutes for a maximum of 3 doses in 15 minutes\" 6/15/15  Yes Lay Paz MD   gabapentin (NEURONTIN) 300 MG capsule Take 300 mg by mouth every morning    Yes Unknown, Entered By History   dapsone 100 MG tablet Take 100 mg by mouth At Bedtime    Yes Reported, Patient   Darunavir Ethanolate (PREZISTA PO) Take 800 mg by mouth At Bedtime.   Yes Reported, Patient   ritonavir (NORVIR) 100 MG capsule Take 1 capsule by mouth At Bedtime    Yes Reported, Patient        ALLERGIES:   Allergies   Allergen Reactions     Lisinopril      Sulfa Drugs         SOCIAL HISTORY:  Social History   Substance Use Topics     Smoking status: Former Smoker    " " Types: Cigarettes     Smokeless tobacco: Not on file     Alcohol Use: No        FAMILY HISTORY:  Family History   Problem Relation Age of Onset     HEART DISEASE Brother 40     CABG     KIDNEY DISEASE Sister      Hypertension Sister      HEART DISEASE Brother      Dilated aorta        PHYSICAL EXAM:   General : NAD, nasal canula  /64 mmHg  Pulse 64  Temp(Src) 98.1  F (36.7  C) (Oral)  Resp 16  Ht 1.803 m (5' 11\")  Wt 80.9 kg (178 lb 5.6 oz)  BMI 24.89 kg/m2  SpO2 100%     PHYSICAL EXAM:  SKIN: no suspicious lesions, rashes  HEAD: Normocephalic. Atraumatic.  NECK: Neck supple. No adenopathy.   EYES: No scleral icterus  RESPIRATORY: Good transmission. CTA bilaterally.   CARDIOVASCULAR: RRR, normal S1, S2,  No murmur appreciated  GASTROINTESTINAL: +BS, soft, non tender, non distended, no guarding/rebound, no hepatosplenomegaly. Rectal tube in place w/ dark stool.   NEURO: CN 2-12 grossly intact, no focal deficits  PSYCH: Normal affect          ADDITIONAL COMMENTS:   I reviewed the patient's new clinical lab test results.   Recent Labs   Lab Test  01/17/17   0523 01/16/17   0450  01/15/17   1452   01/08/17   0636   08/15/16   2353   05/19/16   0928   WBC  10.5  10.2  11.7*   < >  4.7   < >  5.0   < >  5.3   HGB  8.4*  5.9*  6.4*   < >  6.9*   < >  7.7*   < >  7.3*   MCV  90  92  92   < >  90   < >  92   < >  94   PLT  198  164  187   < >  93*   < >  159   < >  150   INR   --    --    --    --   1.41*   --   0.99   --   1.08    < > = values in this interval not displayed.     Recent Labs   Lab Test  01/17/17   0523 01/16/17   0450  01/15/17   0530   POTASSIUM  3.5  3.7  3.5   CHLORIDE  99  99  100   CO2  31  29  29   BUN  43*  104*  82*   ANIONGAP  10  15*  13     Recent Labs   Lab Test  01/17/17   0523  01/16/17   0450  01/12/17   0526  01/09/17   0544   08/16/16   0525   05/28/16   2030   03/05/16   1730   12/03/15   0824   07/06/14   2255   ALBUMIN  2.6*  2.4*  2.2*  2.2*   < >   --    < >   --    < >  "  --    < >  3.2*   < >  3.6   BILITOTAL  0.7   --   0.8  0.7   < >   --    < >   --    < >   --    < >  0.5   < >  0.5   ALT  48   --   152*  264*   < >   --    < >   --    < >   --    < >  17   < >  17   AST  29   --   66*  224*   < >   --    < >   --    < >   --    < >  18   < >  18   PROTEIN   --    --    --    --    --   300*   --   >600*   --   300*   --    --    < >   --    LIPASE   --    --    --    --    --    --    --    --    --    --    --   194   --   243    < > = values in this interval not displayed.           CONSULTATION ASSESSMENT AND PLAN:    Donald Raza is a 66 year old with complex medical history including coronary artery disease (status post multiple coronary stents most recent drug-eluting stent in December 2015), ESRD on hemodialysis, HIV, DM, chronic anemia who was admitted 1/3/2017 with chest pain found to have pneumonia.    1. Acute on chronic anemia: Normocytic, normochromic per peripheral smear. Hgb down 2 grams from baseline, he was transfused 2 units with Hgb today at 8.4. Multifactorial etiology given ESRD/ HD, HIV, current infection, Plavix. Previous colonoscopy in 2012 with 9 mm polyp removed but cannot locate pathology. He did undergo angiogram on 1/4 without intervention. No melena or hematochezia.     - Given recent pneumonia would not recommend EGD at this time.  - Suggest outpatient colonoscopy with Katarina Foster as previously scheduled, it's now 5 months post last VANITA placement. He continues on Plavix.  - Serial Hgb, transfusions per medicine, EPO per nephrology.   - If occult bleeding identified, could consider EGD vs tagged RBC study.   - Check C.diff      I discussed the patient plan with Dr. Keyes. Thank you for asking us to participate in the care of this patient.    Gisela Ding PA-C  [facility]

## 2017-01-17 NOTE — PLAN OF CARE
Problem: Goal Outcome Summary  Goal: Goal Outcome Summary  Outcome: Therapy, progress toward functional goals as expected  SLP: Pt seen for dysphagia f/u with meal tray present. Per RN pt tolerating current diet level; pt with baseline cough. Trials of regular textures, semisolid textures, and thin liquids via cup resulted in adequate time in oral phase and adequate laryngeal elevation; pt with intermittent cough which remained unchanged from baseline cough. Recommend continue regular textures and thin liquids. Pt should be fully upright for all PO, and take small sips/bites. Pt was independently pacing self and alternating consistencies. Goals met. ST to sign off.     Speech Language Therapy Discharge Summary    Reason for therapy discharge:    All goals and outcomes met, no further needs identified.    Progress towards therapy goal(s). See goals on Care Plan in [facility] electronic health record for goal details.  Goals met    Therapy recommendation(s):    No further therapy is recommended.

## 2017-01-17 NOTE — PROGRESS NOTES
[facility]  Infectious Disease Progress Note          Assessment and Plan:   IMPRESSION:    1.  A 66-year-old male, very complex medical history, known to me from prior admission, currently is admitted with chest pain, rule out coronary artery disease, now has developed some increased cough and respiratory symptoms, although chest x-ray is negative.  New acute fever in the hospital as well, question respiratory infection, although not initially obvious, no leukocytosis, not really productive sputum and again chest x-ray negative.    2.  Chronic human immunodeficiency virus infection for over 30 years, treated at [facility], most recent T cells were 3 weeks ago at which time T cells were 263 and undetectable virus, i.e., unlikely to have an opportunistic infection, is on a fairly complicated 5-drug regimen, but well controlled.    3.  Known coronary disease, does not appear he has that as an explanation for current symptoms.    4.  Diabetes mellitus.    5.  End-stage renal disease on chronic dialysis on the transplant list.    6.  Sulfa allergy.    7.  Nephrolithiasis.   8 Resp failure, infiltrate presumed pneumonia, intubated in ICU       RECOMMENDATIONS:    1.  Continue his current antiretroviral therapy, long term    2.  Extubated and looks well, off vanco, taper Dc lev 1-2 days more zosyn then stop            Interval History:   Extubated and awake in ICU,improved overall No + cxs sputum neg  No fever O2 OK              Medications:       epoetin brittni (EPOGEN,PROCRIT) inj ESRD  10,000 Units Intravenous See Admin Instructions     pantoprazole  40 mg Oral QAM     gabapentin  300 mg Oral QAM     gabapentin  600 mg Oral QPM     B complex-C-folic acid  1 capsule Oral Daily     atorvastatin  10 mg Oral Daily     sevelamer  1,600 mg Oral TID w/meals     insulin aspart  1-12 Units Subcutaneous Q4H     albuterol  2.5 mg Nebulization Q4H While awake     insulin glargine  26 Units Subcutaneous BID  "    aspirin chewable tablet 81 mg  81 mg Oral QPM     carvedilol  25 mg Oral BID w/meals     dapsone  100 mg Oral At Bedtime     losartan  100 mg Oral At Bedtime     magnesium oxide (MAG-OX) tablet 400 mg  400 mg Oral At Bedtime     mycophenolate  500 mg Oral BID     sodium chloride  4 mL Nebulization Q4H     heparin  5,000 Units Subcutaneous BID     sodium chloride (PF)  10 mL Intracatheter Q7 Days     piperacillin-tazobactam  2.25 g Intravenous Q6H     - MEDICATION INSTRUCTIONS for Dialysis Patients -   Does not apply See Admin Instructions     sodium chloride (PF)  3 mL Intracatheter Q8H     abacavir  600 mg Oral QPM     calcium acetate  2,001 mg Oral TID w/meals     chlorhexidine  15 mL Swish & Spit BID     clopidogrel (PLAVIX) tablet 75 mg  75 mg Oral QPM     darunavir  800 mg Oral At Bedtime     dolutegravir  50 mg Oral At Bedtime     ritonavir  100 mg Oral At Bedtime                  Physical Exam:   Blood pressure 151/91, pulse 64, temperature 98.1  F (36.7  C), temperature source Oral, resp. rate 14, height 1.803 m (5' 11\"), weight 80.9 kg (178 lb 5.6 oz), SpO2 100 %.  Wt Readings from Last 2 Encounters:   01/16/17 80.9 kg (178 lb 5.6 oz)   11/28/16 88.905 kg (196 lb)     Vital Signs with Ranges  Temp:  [98.1  F (36.7  C)-98.4  F (36.9  C)] 98.1  F (36.7  C)  Heart Rate:  [] 66  Resp:  [13-21] 14  BP: (111-197)/() 151/91 mmHg  SpO2:  [94 %-100 %] 100 %    Constitutional: extubated awake and interactive   Lungs: Congestion to auscultation bilaterally, no crackles or wheezing   Cardiovascular: Regular rate and rhythm, normal S1 and S2, and no murmur noted   Abdomen: Normal bowel sounds, soft, non-distended, non-tender   Skin: No rashes, no cyanosis, no edema   Other:           Data:   All microbiology laboratory data reviewed.  Recent Labs   Lab Test  01/17/17   0523  01/16/17   0450  01/15/17   1452   WBC  10.5  10.2  11.7*   HGB  8.4*  5.9*  6.4*   HCT  26.8*  19.6*  20.8*   MCV  90  92  92 "   PLT  198  164  187     Recent Labs   Lab Test  01/17/17   0523  01/16/17   0450  01/15/17   0530   CR  4.35*  7.56*  5.57*     No lab results found.  Recent Labs   Lab Test  01/07/17   1150  01/05/17 2012 01/05/17 2000 05/19/16   1730  03/05/16   2248  03/05/16   2247  03/05/16   2204  03/05/16   1115  07/06/14   2330   CULT  No growth  Incorrectly ordered by PCU/Clinic Canceled, Test credited ordered sputum culture   instead.    No growth  No growth  No growth  Test canceled by physician Duplicate request Charge credited  Test canceled by physician Duplicate request Charge credited  Test canceled by physician Duplicate request Charge credited  Test canceled by physician Duplicate request Charge credited  No growth  No growth  Charge credited Specimen not received Test canceled by PCU/Clinic  No growth

## 2017-01-17 NOTE — PROGRESS NOTES
"[facility]  Hospitalist Progress Note  Marquez Tolliver, DO 01/17/2017    Reason for Stay (Diagnosis): Pneumonia         Assessment and Plan:      Summary of Stay: Donald Raza is a 66 year old male admitted on 1/3/2017 with chest pain     Problem List:    1. Chest pain with known coronary artery disease.  Cardiology consult appreciated. Angio done 1/4/17.  No significant lesions noted.  Continue ASA, Coreg, Plavix, Cozaar, Lipitor.  2. Pneumonia.  Vanco stopped 1/16.  Continue IV Zosyn, Levaquin for now.  ID following.  3. End stage kidney failure.  Hemodialysis per Nephrology.  Continue Phoslo.  4. Acute on chronic anemia.  Hgb 8.4 this morning.  Hemoccult positive.  GI consult.  Monitor.  5. HIV.  Continue Ziagen, Prezista, Tivicay, and Norvir.  Dapsone.  6. HTN.  Continue Cozaar, Coreg.  7. Diabetes Mellitus.  Continue Lantus, Novolog SSI.  8. Weakness.  PT and OT consults.    DVT Prophylaxis: Heparin SQ  Code Status: Full Code  Discharge Dispo: PT and OT consult.  May transfer out of ICU today when bed available.  Estimated Disch Date / # of Days until Disch: 2+            Interval History (Subjective):      Feels weak.  Still short of breath at times, but improved from previous.  No CP, F/C, N/V, or diarrhea.                  Physical Exam:      Last Vital Signs:  /64 mmHg  Pulse 64  Temp(Src) 98.1  F (36.7  C) (Oral)  Resp 16  Ht 1.803 m (5' 11\")  Wt 80.9 kg (178 lb 5.6 oz)  BMI 24.89 kg/m2  SpO2 100%    Gen:  NAD, A&Ox3.  Eyes:  PERRL, sclera anicteric.  OP:  MMM, no lesions.  Neck:  Supple.  CV:  Regular, no murmurs.  Lung:  CTA b/l, normal effort.  Ab:  +BS, soft.  Skin:  Warm, dry to touch.  No rash.  Ext:  No pitting edema LE b/l.           Medications:      All current medications were reviewed with changes reflected in problem list.         Data:      All new lab and imaging data was reviewed.   Labs:    Recent Labs  Lab 01/17/17  0523      POTASSIUM 3.5   CHLORIDE " 99   CO2 31   ANIONGAP 10   GLC 77   BUN 43*   CR 4.35*   GFRESTIMATED 14*   GFRESTBLACK 16*   PRABHA 9.3       Recent Labs  Lab 01/17/17  0523   WBC 10.5   HGB 8.4*   HCT 26.8*   MCV 90         Imaging:   No results found for this or any previous visit (from the past 24 hour(s)).

## 2017-01-18 LAB
C DIFF TOX B STL QL: NORMAL
GLUCOSE BLDC GLUCOMTR-MCNC: 124 MG/DL (ref 70–99)
GLUCOSE BLDC GLUCOMTR-MCNC: 225 MG/DL (ref 70–99)
GLUCOSE BLDC GLUCOMTR-MCNC: 91 MG/DL (ref 70–99)
HBV SURFACE AB SERPL IA-ACNC: 20.44 M[IU]/ML
HBV SURFACE AG SERPL QL IA: NONREACTIVE
HGB BLD-MCNC: 8.2 G/DL (ref 13.3–17.7)
MAGNESIUM SERPL-MCNC: 2.3 MG/DL (ref 1.6–2.3)
POTASSIUM SERPL-SCNC: 3.7 MMOL/L (ref 3.4–5.3)
SPECIMEN SOURCE: NORMAL

## 2017-01-18 PROCEDURE — 25000125 ZZHC RX 250: Performed by: INTERNAL MEDICINE

## 2017-01-18 PROCEDURE — 99232 SBSQ HOSP IP/OBS MODERATE 35: CPT | Performed by: INTERNAL MEDICINE

## 2017-01-18 PROCEDURE — 25000132 ZZH RX MED GY IP 250 OP 250 PS 637: Mod: GY | Performed by: INTERNAL MEDICINE

## 2017-01-18 PROCEDURE — 63400005 ZZH RX 634: Performed by: INTERNAL MEDICINE

## 2017-01-18 PROCEDURE — 87340 HEPATITIS B SURFACE AG IA: CPT | Performed by: INTERNAL MEDICINE

## 2017-01-18 PROCEDURE — 25000132 ZZH RX MED GY IP 250 OP 250 PS 637: Mod: GY

## 2017-01-18 PROCEDURE — 94640 AIRWAY INHALATION TREATMENT: CPT

## 2017-01-18 PROCEDURE — 25000128 H RX IP 250 OP 636: Performed by: INTERNAL MEDICINE

## 2017-01-18 PROCEDURE — 25000131 ZZH RX MED GY IP 250 OP 636 PS 637: Mod: GY | Performed by: INTERNAL MEDICINE

## 2017-01-18 PROCEDURE — 25000308 HC RX OP HPI UCR WEL MED 250 IP 250: Performed by: INTERNAL MEDICINE

## 2017-01-18 PROCEDURE — 90937 HEMODIALYSIS REPEATED EVAL: CPT

## 2017-01-18 PROCEDURE — A9270 NON-COVERED ITEM OR SERVICE: HCPCS | Mod: GY | Performed by: INTERNAL MEDICINE

## 2017-01-18 PROCEDURE — 87493 C DIFF AMPLIFIED PROBE: CPT | Performed by: PHYSICIAN ASSISTANT

## 2017-01-18 PROCEDURE — 84132 ASSAY OF SERUM POTASSIUM: CPT | Performed by: INTERNAL MEDICINE

## 2017-01-18 PROCEDURE — 86706 HEP B SURFACE ANTIBODY: CPT | Performed by: INTERNAL MEDICINE

## 2017-01-18 PROCEDURE — 12000007 ZZH R&B INTERMEDIATE

## 2017-01-18 PROCEDURE — 83735 ASSAY OF MAGNESIUM: CPT | Performed by: INTERNAL MEDICINE

## 2017-01-18 PROCEDURE — 85018 HEMOGLOBIN: CPT | Performed by: INTERNAL MEDICINE

## 2017-01-18 PROCEDURE — 00000146 ZZHCL STATISTIC GLUCOSE BY METER IP

## 2017-01-18 PROCEDURE — 94640 AIRWAY INHALATION TREATMENT: CPT | Mod: 76

## 2017-01-18 PROCEDURE — 40000275 ZZH STATISTIC RCP TIME EA 10 MIN

## 2017-01-18 RX ORDER — ALBUTEROL SULFATE 0.83 MG/ML
2.5 SOLUTION RESPIRATORY (INHALATION)
Status: DISCONTINUED | OUTPATIENT
Start: 2017-01-18 | End: 2017-01-22 | Stop reason: HOSPADM

## 2017-01-18 RX ORDER — SACCHAROMYCES BOULARDII 250 MG
250 CAPSULE ORAL 2 TIMES DAILY
Status: DISCONTINUED | OUTPATIENT
Start: 2017-01-18 | End: 2017-01-22 | Stop reason: HOSPADM

## 2017-01-18 RX ORDER — ALBUTEROL SULFATE 0.83 MG/ML
2.5 SOLUTION RESPIRATORY (INHALATION)
Status: DISCONTINUED | OUTPATIENT
Start: 2017-01-19 | End: 2017-01-22 | Stop reason: HOSPADM

## 2017-01-18 RX ADMIN — DARUNAVIR 800 MG: 800 TABLET, FILM COATED ORAL at 19:47

## 2017-01-18 RX ADMIN — SEVELAMER CARBONATE 1600 MG: 800 TABLET, FILM COATED ORAL at 19:56

## 2017-01-18 RX ADMIN — CHLORHEXIDINE GLUCONATE 15 ML: 1.2 RINSE ORAL at 08:50

## 2017-01-18 RX ADMIN — HEPARIN SODIUM 5000 UNITS: 5000 INJECTION, SOLUTION INTRAVENOUS; SUBCUTANEOUS at 21:54

## 2017-01-18 RX ADMIN — TAZOBACTAM SODIUM AND PIPERACILLIN SODIUM 2.25 G: 250; 2 INJECTION, SOLUTION INTRAVENOUS at 11:20

## 2017-01-18 RX ADMIN — ALBUTEROL SULFATE 2.5 MG: 2.5 SOLUTION RESPIRATORY (INHALATION) at 12:02

## 2017-01-18 RX ADMIN — Medication 250 MG: at 21:48

## 2017-01-18 RX ADMIN — ALBUTEROL SULFATE 2.5 MG: 2.5 SOLUTION RESPIRATORY (INHALATION) at 07:24

## 2017-01-18 RX ADMIN — HYDROMORPHONE HYDROCHLORIDE 0.5 MG: 10 INJECTION, SOLUTION INTRAMUSCULAR; INTRAVENOUS; SUBCUTANEOUS at 10:21

## 2017-01-18 RX ADMIN — CALCIUM ACETATE 2001 MG: 667 CAPSULE ORAL at 19:43

## 2017-01-18 RX ADMIN — CHLORHEXIDINE GLUCONATE 15 ML: 1.2 RINSE ORAL at 21:51

## 2017-01-18 RX ADMIN — TAZOBACTAM SODIUM AND PIPERACILLIN SODIUM 2.25 G: 250; 2 INJECTION, SOLUTION INTRAVENOUS at 06:29

## 2017-01-18 RX ADMIN — Medication 1 CAPSULE: at 08:49

## 2017-01-18 RX ADMIN — ATORVASTATIN CALCIUM 10 MG: 10 TABLET, FILM COATED ORAL at 08:49

## 2017-01-18 RX ADMIN — SODIUM CHLORIDE 300 ML: 9 INJECTION, SOLUTION INTRAVENOUS at 10:23

## 2017-01-18 RX ADMIN — MYCOPHENOLATE MOFETIL 500 MG: 250 CAPSULE ORAL at 21:48

## 2017-01-18 RX ADMIN — CALCIUM ACETATE 2001 MG: 667 CAPSULE ORAL at 11:19

## 2017-01-18 RX ADMIN — HEPARIN SODIUM 5000 UNITS: 5000 INJECTION, SOLUTION INTRAVENOUS; SUBCUTANEOUS at 08:50

## 2017-01-18 RX ADMIN — ALBUTEROL SULFATE 2.5 MG: 2.5 SOLUTION RESPIRATORY (INHALATION) at 15:47

## 2017-01-18 RX ADMIN — CARVEDILOL 25 MG: 25 TABLET, FILM COATED ORAL at 19:43

## 2017-01-18 RX ADMIN — INSULIN GLARGINE 26 UNITS: 100 INJECTION, SOLUTION SUBCUTANEOUS at 11:59

## 2017-01-18 RX ADMIN — MAGNESIUM OXIDE TAB 400 MG (241.3 MG ELEMENTAL MG) 400 MG: 400 (241.3 MG) TAB at 21:48

## 2017-01-18 RX ADMIN — DAPSONE 100 MG: 25 TABLET ORAL at 21:51

## 2017-01-18 RX ADMIN — SEVELAMER CARBONATE 1600 MG: 800 TABLET, FILM COATED ORAL at 11:19

## 2017-01-18 RX ADMIN — SODIUM CHLORIDE, PRESERVATIVE FREE: 5 INJECTION INTRAVENOUS at 17:12

## 2017-01-18 RX ADMIN — PANTOPRAZOLE SODIUM 40 MG: 40 TABLET, DELAYED RELEASE ORAL at 08:49

## 2017-01-18 RX ADMIN — ALBUTEROL SULFATE 2.5 MG: 2.5 SOLUTION RESPIRATORY (INHALATION) at 19:35

## 2017-01-18 RX ADMIN — ERYTHROPOIETIN 10000 UNITS: 10000 INJECTION, SOLUTION INTRAVENOUS; SUBCUTANEOUS at 10:24

## 2017-01-18 RX ADMIN — SODIUM CHLORIDE, PRESERVATIVE FREE: 5 INJECTION INTRAVENOUS at 17:11

## 2017-01-18 RX ADMIN — GABAPENTIN 300 MG: 300 CAPSULE ORAL at 11:19

## 2017-01-18 RX ADMIN — INSULIN GLARGINE 26 UNITS: 100 INJECTION, SOLUTION SUBCUTANEOUS at 21:54

## 2017-01-18 RX ADMIN — CLOPIDOGREL 75 MG: 75 TABLET, FILM COATED ORAL at 19:43

## 2017-01-18 RX ADMIN — CALCIUM ACETATE 2001 MG: 667 CAPSULE ORAL at 08:49

## 2017-01-18 RX ADMIN — GABAPENTIN 600 MG: 300 CAPSULE ORAL at 21:48

## 2017-01-18 RX ADMIN — ASPIRIN 81 MG 81 MG: 81 TABLET ORAL at 19:43

## 2017-01-18 RX ADMIN — MYCOPHENOLATE MOFETIL 500 MG: 250 CAPSULE ORAL at 11:19

## 2017-01-18 RX ADMIN — RITONAVIR 100 MG: 100 TABLET, FILM COATED ORAL at 21:51

## 2017-01-18 RX ADMIN — SODIUM CHLORIDE SOLN NEBU 3% 4 ML: 3 NEBU SOLN at 07:24

## 2017-01-18 RX ADMIN — SEVELAMER CARBONATE 1600 MG: 800 TABLET, FILM COATED ORAL at 08:49

## 2017-01-18 RX ADMIN — CARVEDILOL 25 MG: 25 TABLET, FILM COATED ORAL at 08:49

## 2017-01-18 RX ADMIN — ABACAVIR 600 MG: 300 TABLET ORAL at 19:48

## 2017-01-18 RX ADMIN — LOSARTAN POTASSIUM 100 MG: 100 TABLET, FILM COATED ORAL at 21:48

## 2017-01-18 NOTE — PROGRESS NOTES
ICU End of Shift Summary.  For vital signs and complete assessments, please see documentation flowsheets.     Pertinent assessments: Up in chair most of day shift.  Continues to work with IS.  Lungs decreased in  Both bases.  Upper lobes coarse.  Strong cough- able to bring up sputum.  Afebrile. Sinus rhythm.   Major Shift Events: GI saw today, no plans for intervention at this time. Pt C/O severe rectal pain, rectal tube D/C.. Pain improved.  Up once to void and have a BM   Plan (Upcoming Events):  Continue with plan of care.  Discharge/Transfer Needs: will transfer to tele floor when bed available    Bedside Shift Report Completed   Bedside Safety Check Completed

## 2017-01-18 NOTE — PLAN OF CARE
Problem: Goal Outcome Summary  Goal: Goal Outcome Summary  Order received and chart reviewed.  Pt having dialysis this AM.  Will check back as schedule allows.

## 2017-01-18 NOTE — PROGRESS NOTES
Infection Prevention:    Patient placed on Enteric Precautions because of concern for Cdiff. Please contact Infection Prevention with any questions/concerns at *[phone].    Margaret De Jesus, ICP

## 2017-01-18 NOTE — PROGRESS NOTES
[facility]  Hospitalist Progress Note  Marquez Tolliver, DO 01/18/2017    Reason for Stay (Diagnosis): Pneumonia         Assessment and Plan:      Summary of Stay: Donald Rzaa is a 66 year old male with complex medical hx of HAART for hx of HIV, ESRD on HD, CAD, Hypertension, DM type 2 on insulin, chronic anemia  admitted on 1/3/2017 with episodes of chest pain and was determined non cardiac in etiology given heart cath findings this admit. Hospital course then complicated by intermittent fever spikes ( similar to previous hospitalizations) and increasing O2 requirements that eventually led to severe respiratory distress, unresponsiveness and a CODE blue on 1/7 resulting in intubation/ventilation.  Course complicated by failed attempt at extubation on 1/9 with re-intubation 3 hours later.  he then self extubated on 1/10 again requiring re-intubation.  Has now been doing well after extubation on 1/14.    Problem List:    1. Chest pain with known coronary artery disease.  Cardiology consult appreciated. Has known CAD.  Angio done 1/4/17.  No significant lesions noted.  Continue ASA, Coreg, Plavix, Cozaar, Lipitor.  2. Pneumonia.  Vanco stopped 1/16.  Levaquin stopped 1/17.  Continue IV Zosyn.  ID following.  3. End stage kidney failure.  Hemodialysis per Nephrology.  Continue Phoslo.  4. Acute on chronic anemia.  Hgb 8.2 this morning.  Hemoccult positive.  GI following.  They recommend follow up in the outpatient setting.  Monitor.  5. HIV.  Continue Ziagen, Prezista, Tivicay, and Norvir.  Dapsone.  6. HTN.  Continue Cozaar, Coreg.  7. Diabetes Mellitus.  Continue Lantus, Novolog SSI.  8. Weakness.  PT and OT consults.  9. Diarrhea.  C diff toxin negative.  Start probiotic.    DVT Prophylaxis: Heparin SQ  Code Status: Full Code  Discharge Dispo: PT and OT consult.  May transfer out of ICU today when bed available.  Estimated Disch Date / # of Days until Disch: 1-2        Interval History  "(Subjective):      Breathing improved to baseline.  Two loose stools overnight.  No CP, F/C, N/V.                  Physical Exam:      Last Vital Signs:  /70 mmHg  Pulse 64  Temp(Src) 97.7  F (36.5  C) (Oral)  Resp 26  Ht 1.803 m (5' 11\")  Wt 79.1 kg (174 lb 6.1 oz)  BMI 24.33 kg/m2  SpO2 98%    Gen:  NAD, A&Ox3.  Eyes:  PERRL, sclera anicteric.  OP:  MMM, no lesions.  Neck:  Supple.  CV:  Regular, no murmurs.  Lung:  CTA b/l, normal effort.  Ab:  +BS, soft.  Skin:  Warm, dry to touch.  No rash.  Ext:  No pitting edema LE b/l.           Medications:      All current medications were reviewed with changes reflected in problem list.         Data:      All new lab and imaging data was reviewed.   Labs:    Recent Labs  Lab 01/18/17 0630 01/17/17  0523   NA  --  140   POTASSIUM 3.7 3.5   CHLORIDE  --  99   CO2  --  31   ANIONGAP  --  10   GLC  --  77   BUN  --  43*   CR  --  4.35*   GFRESTIMATED  --  14*   GFRESTBLACK  --  16*   PRABHA  --  9.3       Recent Labs  Lab 01/18/17 0630 01/17/17  0523   WBC  --  10.5   HGB 8.2* 8.4*   HCT  --  26.8*   MCV  --  90   PLT  --  198      Imaging:   No results found for this or any previous visit (from the past 24 hour(s)).    "

## 2017-01-18 NOTE — PLAN OF CARE
Problem: Goal Outcome Summary  Goal: Goal Outcome Summary  OT:  eval order received and chart reviewed.  Attempted session, but Pt had dialysis and was later eating lunch.  Plan to reschedule for tomorrow with full report and recommendations to follow.

## 2017-01-18 NOTE — PROGRESS NOTES
[facility]  Infectious Disease Progress Note          Assessment and Plan:   IMPRESSION:    1.  A 66-year-old male, very complex medical history, known to me from prior admission, currently is admitted with chest pain, rule out coronary artery disease, now has developed some increased cough and respiratory symptoms, although chest x-ray is negative.  New acute fever in the hospital as well, question respiratory infection, although not initially obvious, no leukocytosis, not really productive sputum and again chest x-ray negative.    2.  Chronic human immunodeficiency virus infection for over 30 years, treated at [facility], most recent T cells were 3 weeks ago at which time T cells were 263 and undetectable virus, i.e., unlikely to have an opportunistic infection, is on a fairly complicated 5-drug regimen, but well controlled.    3.  Known coronary disease, does not appear he has that as an explanation for current symptoms.    4.  Diabetes mellitus.    5.  End-stage renal disease on chronic dialysis on the transplant list.    6.  Sulfa allergy.    7.  Nephrolithiasis.   8 Resp failure, infiltrate presumed pneumonia, intubated in ICU       RECOMMENDATIONS:    1.  Continue his current antiretroviral therapy, long term    2.  Extubated and looks well, Dc antibiotics and watch            Interval History:   Extubated and awake in ICU,improved overall No + cxs sputum neg  No fever O2 OK              Medications:       saccharomyces boulardii  250 mg Oral BID     insulin aspart  1-10 Units Subcutaneous TID AC     insulin aspart  1-7 Units Subcutaneous At Bedtime     pantoprazole  40 mg Oral QAM     gabapentin  300 mg Oral QAM     gabapentin  600 mg Oral QPM     B complex-C-folic acid  1 capsule Oral Daily     atorvastatin  10 mg Oral Daily     sevelamer  1,600 mg Oral TID w/meals     albuterol  2.5 mg Nebulization Q4H While awake     insulin glargine  26 Units Subcutaneous BID     aspirin  "chewable tablet 81 mg  81 mg Oral QPM     carvedilol  25 mg Oral BID w/meals     dapsone  100 mg Oral At Bedtime     losartan  100 mg Oral At Bedtime     magnesium oxide (MAG-OX) tablet 400 mg  400 mg Oral At Bedtime     mycophenolate  500 mg Oral BID     heparin  5,000 Units Subcutaneous BID     sodium chloride (PF)  10 mL Intracatheter Q7 Days     - MEDICATION INSTRUCTIONS for Dialysis Patients -   Does not apply See Admin Instructions     sodium chloride (PF)  3 mL Intracatheter Q8H     abacavir  600 mg Oral QPM     calcium acetate  2,001 mg Oral TID w/meals     chlorhexidine  15 mL Swish & Spit BID     clopidogrel (PLAVIX) tablet 75 mg  75 mg Oral QPM     darunavir  800 mg Oral At Bedtime     dolutegravir  50 mg Oral At Bedtime     ritonavir  100 mg Oral At Bedtime                  Physical Exam:   Blood pressure 143/70, pulse 64, temperature 97.7  F (36.5  C), temperature source Oral, resp. rate 26, height 1.803 m (5' 11\"), weight 79.1 kg (174 lb 6.1 oz), SpO2 98 %.  Wt Readings from Last 2 Encounters:   01/18/17 79.1 kg (174 lb 6.1 oz)   11/28/16 88.905 kg (196 lb)     Vital Signs with Ranges  Temp:  [97.5  F (36.4  C)-98.4  F (36.9  C)] 97.7  F (36.5  C)  Heart Rate:  [64-91] 66  Resp:  [11-29] 26  BP: ()/(56-96) 143/70 mmHg  SpO2:  [85 %-100 %] 98 %    Constitutional: extubated awake and interactive   Lungs: Congestion to auscultation bilaterally, no crackles or wheezing   Cardiovascular: Regular rate and rhythm, normal S1 and S2, and no murmur noted   Abdomen: Normal bowel sounds, soft, non-distended, non-tender   Skin: No rashes, no cyanosis, no edema   Other:           Data:   All microbiology laboratory data reviewed.  Recent Labs   Lab Test  01/18/17   0630  01/17/17   0523  01/16/17   0450  01/15/17   1452   WBC   --   10.5  10.2  11.7*   HGB  8.2*  8.4*  5.9*  6.4*   HCT   --   26.8*  19.6*  20.8*   MCV   --   90  92  92   PLT   --   198  164  187     Recent Labs   Lab Test  01/17/17   0523  " 01/16/17   0450  01/15/17   0530   CR  4.35*  7.56*  5.57*     No lab results found.  Recent Labs   Lab Test  01/07/17   1150  01/05/17 2012 01/05/17   2000  05/19/16   1730  03/05/16   2248  03/05/16   2247  03/05/16   2204  03/05/16   1115  07/06/14   2330   CULT  No growth  Incorrectly ordered by PCU/Clinic Canceled, Test credited ordered sputum culture   instead.    No growth  No growth  No growth  Test canceled by physician Duplicate request Charge credited  Test canceled by physician Duplicate request Charge credited  Test canceled by physician Duplicate request Charge credited  Test canceled by physician Duplicate request Charge credited  No growth  No growth  Charge credited Specimen not received Test canceled by PCU/Clinic  No growth

## 2017-01-18 NOTE — PROGRESS NOTES
Pt seen for scheduled nebulizer treatments. Bs diminished. Pt uses Aerobika on own. Spo2 99% on 2L NC. RT will continue to assess.

## 2017-01-18 NOTE — PROGRESS NOTES
POTASSIUM   Date Value Ref Range Status   01/18/2017 3.7 3.4 - 5.3 mmol/L Final     HGB      8.2   1/18/2017  Weight: 79.1 kg (174 lb 6.1 oz)  POST WT 76.1jg  DIALYSIS PROCEDURE NOTE    Patient dialyzed for 3.5 hrs. on a 3 K bath with a net fluid removal of 2L.  A BFR of 400 ml/min was obtained via a LUAF using 15gauge needles.    The patient was seen by Dr. Wilkes during the treatment.  Total heparin received during the treatment: 0 units. Sites held x 15 min then  pressure drsgs applied.  Meds  given:EPO 10,000units IV Complications: Hypotension with blurry vision and headache.  Procedure and ESRD teaching done and questions answered. See flowsheet in EPIC for further details and post assessment.  Machine water alarm in place and functioning.  Pt dialyzed in the  on portable system.  Vascular Access: Aseptic prep done for both on/off.  Report received from:Gail Rubin R.N.  Report given to:Gail Rubin R.n.  HEPATITIS B SURFACE ANTIGEN neg DATE 12/20/16 HEPATITIS B SURFACE ANTIBODY unknown  Chlorine/Chloramine water system checked every 4 hours.  Outpatient Dialysis at [facility]

## 2017-01-18 NOTE — PLAN OF CARE
Problem: Goal Outcome Summary  Goal: Goal Outcome Summary  Outcome: Improving  ICU End of Shift Summary.  For vital signs and complete assessments, please see documentation flowsheets.     Pertinent assessments: Lungs course/dim, decreased Oxygen requirements today. Alert and oriented, up to commode with assist x 1.  Major Shift Events: Pt had 1 large bowel movement.   Plan (Upcoming Events): Dialysis today  Discharge/Transfer Needs: TBD    Bedside Shift Report Completed   Bedside Safety Check Completed

## 2017-01-18 NOTE — PROGRESS NOTES
Inpatient Dialysis Progress Note            Assessment and Plan:     1.  ESKD. Stable run.  Good access function.  2 KG removed.  BP down c symptoms.  I think we have found his new dry weight.  Anticipate next run Friday.     2.  Anemia.  Cause of falling hgb uncertain.  Iron stores are not low. If anything they are high.  Folate and B12 are not low.  Haptoglobin is not low so hemolysis seems unlikely.  His stool output is black.  GI losses ?  Need hemoccult stool.   Perhaps life span of transfused RBC is short ?  He is on Epogen 10K units.      3.  HTN. On carvedilol 25 mg BID and losartan 100 mg daily.  BP fell to 92/62 c symptoms.  He did have his carvedilol prerun.  I will hold it pre HD for future runs.  He looks like he is at dry weight today.      4.  FEN. K 3.7 on K3 bath.  Phos 7.1.  He is on Phoslo 3 TID c meals.  Ca 10.1.  Added Renvela 2 TID c meals.  Need to recheck phos.     5. Sepsis/respiratory failure:  Improved.  Specific organism not found.            Interval History:   Doing OK.  Not SOB.  Weak.  BP fell with symptoms during run.  It appears that we have arrived at his new dry weight.        Dialysis Parameters:     Wt Readings from Last 4 Encounters:   01/18/17 79.1 kg (174 lb 6.1 oz)   11/28/16 88.905 kg (196 lb)   11/28/16 86.183 kg (190 lb)   11/07/16 86.183 kg (190 lb)     I/O last 3 completed shifts:  In: 1650 [P.O.:900; I.V.:750]  Out: 300 [Urine:300]  BP Readings from Last 3 Encounters:   01/18/17 100/74   11/28/16 120/70   08/19/16 140/68       Routine, ONE TIME, Starting today For 1 Occurrences  Weight Loss (kg): 2  Dialysis Temp: 36.5  C  Access Device: AVF  Access Site: L arm  Dialyzer: Revaclear  Dialysis Bath: K 3  Blood Flow Rate (mL/min): 400  Total Treatment Time (hrs): 3.5  Heparin: none         Medications and Allergies:   Reviewed in EPIC      insulin aspart  1-10 Units Subcutaneous TID AC     insulin aspart  1-7 Units Subcutaneous At Bedtime     pantoprazole  40 mg Oral QAM      gabapentin  300 mg Oral QAM     gabapentin  600 mg Oral QPM     B complex-C-folic acid  1 capsule Oral Daily     atorvastatin  10 mg Oral Daily     sevelamer  1,600 mg Oral TID w/meals     albuterol  2.5 mg Nebulization Q4H While awake     insulin glargine  26 Units Subcutaneous BID     aspirin chewable tablet 81 mg  81 mg Oral QPM     carvedilol  25 mg Oral BID w/meals     dapsone  100 mg Oral At Bedtime     losartan  100 mg Oral At Bedtime     magnesium oxide (MAG-OX) tablet 400 mg  400 mg Oral At Bedtime     mycophenolate  500 mg Oral BID     heparin  5,000 Units Subcutaneous BID     sodium chloride (PF)  10 mL Intracatheter Q7 Days     piperacillin-tazobactam  2.25 g Intravenous Q6H     - MEDICATION INSTRUCTIONS for Dialysis Patients -   Does not apply See Admin Instructions     sodium chloride (PF)  3 mL Intracatheter Q8H     abacavir  600 mg Oral QPM     calcium acetate  2,001 mg Oral TID w/meals     chlorhexidine  15 mL Swish & Spit BID     clopidogrel (PLAVIX) tablet 75 mg  75 mg Oral QPM     darunavir  800 mg Oral At Bedtime     dolutegravir  50 mg Oral At Bedtime     ritonavir  100 mg Oral At Bedtime     clonazePAM (klonoPIN) tablet 0.5 mg, magnesium hydroxide, guaiFENesin-codeine, glucose **OR** dextrose **OR** glucagon, hypromellose-dextran, hydrALAZINE, naloxone, IV fluid REPLACEMENT ONLY, HYDROmorphone, lidocaine 4%, heparin lock flush, fentaNYL, magnesium sulfate, midazolam, sodium chloride (PF), lidocaine, lidocaine 4%, sodium chloride (PF), HOLD MEDICATION, hydrocortisone, terbinafine, bisacodyl, ondansetron **OR** ondansetron     Allergies   Allergen Reactions     Lisinopril      Sulfa Drugs               Labs:     BMP  Recent Labs  Lab 01/18/17  0630 01/17/17  0523 01/16/17  0450 01/15/17  0530 01/12/17  0526   NA  --  140 143 142 137   POTASSIUM 3.7 3.5 3.7 3.5 4.1   CHLORIDE  --  99 99 100 94   PRABHA  --  9.3 10.1 9.3 9.2   CO2  --  31 29 29 29   BUN  --  43* 104* 82* 65*   CR  --  4.35* 7.56*  5.57* 7.76*   GLC  --  77 100* 109* 240*     CBC  Recent Labs  Lab 01/18/17  0630 01/17/17  0523 01/16/17  0450 01/15/17  1452 01/15/17  0530   WBC  --  10.5 10.2 11.7* 14.1*   HGB 8.2* 8.4* 5.9* 6.4* 6.3*   HCT  --  26.8* 19.6* 20.8* 19.9*   MCV  --  90 92 92 91   PLT  --  198 164 187 182     Lab Results   Component Value Date    AST 29 01/17/2017    ALT 48 01/17/2017    ALKPHOS 54 01/17/2017    BILITOTAL 0.7 01/17/2017    BILICONJ 0.0 07/06/2014            Physical Exam:   Vitals were reviewed in [facility]    Wt Readings from Last 3 Encounters:   01/18/17 79.1 kg (174 lb 6.1 oz)   11/28/16 88.905 kg (196 lb)   11/28/16 86.183 kg (190 lb)       Intake/Output Summary (Last 24 hours) at 01/18/17 1151  Last data filed at 01/18/17 1120   Gross per 24 hour   Intake   1470 ml   Output   2300 ml   Net   -830 ml       GENERAL APPEARANCE: pleasant, no distress, a & o  HEENT:  Eyes/ears/nose grossly normal, neck supple  RESP: lungs clear to auscultation with good efforts, no crackles, rhonchi or wheezes  CV: regular rate and rhythm, normal S1 S2, no murmur, click or rub   ABDOMEN: soft, nontender, bowel sounds normal  EXTREMITIES/SKIN: no edema, no rashes or lesions     Pt seen on dialysis.  Stable run.  Good BFR.      Attestation:  I have reviewed today's vital signs, notes, medications, labs and imaging.     Rudolph Wilkes MD  [facility] - Nephrology  [phone]

## 2017-01-18 NOTE — PROGRESS NOTES
"GASTROENTEROLOGY PROGRESS NOTE        SUBJECTIVE:  No abdominal pain, nausea/ vomiting. Rectal tube removed yesterday. No melena or hematochezia reported.     OBJECTIVE:    /71 mmHg  Pulse 64  Temp(Src) 97.8  F (36.6  C) (Oral)  Resp 16  Ht 1.803 m (5' 11\")  Wt 79.1 kg (174 lb 6.1 oz)  BMI 24.33 kg/m2  SpO2 91%  Temp (24hrs), Av  F (36.7  C), Min:97.5  F (36.4  C), Max:98.4  F (36.9  C)    Patient Vitals for the past 72 hrs:   Weight   17 0600 79.1 kg (174 lb 6.1 oz)   17 0730 80.9 kg (178 lb 5.6 oz)   17 0445 80.9 kg (178 lb 5.6 oz)       Intake/Output Summary (Last 24 hours) at 17 0950  Last data filed at 17 0900   Gross per 24 hour   Intake   1470 ml   Output    300 ml   Net   1170 ml        PHYSICAL EXAM     Constitutional: Resting comfortably  Abdomen: soft, NTTP  NEURO: CN 2-12 grossly intact, no focal deficits  SKIN: No jaundice         Additional Comments:  ROS, FH, SH: See initial GI consult for details.     I have reviewed the patient's new clinical lab results:     Recent Labs   Lab Test  17   0630  17   0523  17   0450  01/15/17   1452   17   0636   08/15/16   2353   16   0928   WBC   --   10.5  10.2  11.7*   < >  4.7   < >  5.0   < >  5.3   HGB  8.2*  8.4*  5.9*  6.4*   < >  6.9*   < >  7.7*   < >  7.3*   MCV   --   90  92  92   < >  90   < >  92   < >  94   PLT   --   198  164  187   < >  93*   < >  159   < >  150   INR   --    --    --    --    --   1.41*   --   0.99   --   1.08    < > = values in this interval not displayed.     Recent Labs   Lab Test  17   0630  17   0523  17   0450  01/15/17   0530   POTASSIUM  3.7  3.5  3.7  3.5   CHLORIDE   --   99  99  100   CO2   --   31  29  29   BUN   --   43*  104*  82*   ANIONGAP   --   10  15*  13     Recent Labs   Lab Test  17   0523  17   0450  17   0526  17   0544   16   0525   16   2030   16   1730   12/03/15   0824   " 07/06/14   2255   ALBUMIN  2.6*  2.4*  2.2*  2.2*   < >   --    < >   --    < >   --    < >  3.2*   < >  3.6   BILITOTAL  0.7   --   0.8  0.7   < >   --    < >   --    < >   --    < >  0.5   < >  0.5   ALT  48   --   152*  264*   < >   --    < >   --    < >   --    < >  17   < >  17   AST  29   --   66*  224*   < >   --    < >   --    < >   --    < >  18   < >  18   PROTEIN   --    --    --    --    --   300*   --   >600*   --   300*   --    --    < >   --    LIPASE   --    --    --    --    --    --    --    --    --    --    --   194   --   243    < > = values in this interval not displayed.        ASSESSMENT/ PLAN  Donald Raza is a 66 year old with complex medical history including coronary artery disease (status post multiple coronary stents most recent drug-eluting stent in December 2015), ESRD on hemodialysis, HIV, DM, chronic anemia who was admitted 1/3/2017 with chest pain found to have pneumonia.    1. Acute on chronic anemia: Normocytic, normochromic per peripheral smear. Hgb down 2 grams from baseline, he was transfused 2 units with Hgb today at 8.4. Multifactorial etiology given ESRD/ HD, HIV, current infection, Plavix, gi bleed. Previous colonoscopy in 2012 with 9 mm polyp removed but cannot locate pathology. He did undergo angiogram on 1/4 without intervention. No melena or hematochezia. HgB stable around 8.     - Given recent pneumonia/ intubation would not recommend EGD at this time.  - Suggest outpatient colonoscopy with Katarina Foster as previously scheduled, it's now 5 months post last VANITA placement. He continues on Plavix and DVT prophylaxis SC heparin  - Serial Hgb, transfusions per medicine, EPO per nephrology.    - If occult bleeding identified, could consider EGD vs tagged RBC study. Document stool output.  - Pending C.diff      Gisela Ding PA-C  [facility]

## 2017-01-18 NOTE — PLAN OF CARE
Problem: Respiratory Insufficiency (Adult)  Goal: Identify Related Risk Factors and Signs and Symptoms  Related risk factors and signs and symptoms are identified upon initiation of Human Response Clinical Practice Guideline (CPG)  Outcome: Improving  Vss, alert & oriented, cont on nc 1 L, denies pain, sob. Eating, family at bedside, no complaints,

## 2017-01-19 ENCOUNTER — APPOINTMENT (OUTPATIENT)
Dept: OCCUPATIONAL THERAPY | Facility: CLINIC | Age: 67
DRG: 286 | End: 2017-01-19
Payer: MEDICARE

## 2017-01-19 ENCOUNTER — APPOINTMENT (OUTPATIENT)
Dept: PHYSICAL THERAPY | Facility: CLINIC | Age: 67
DRG: 286 | End: 2017-01-19
Attending: INTERNAL MEDICINE
Payer: MEDICARE

## 2017-01-19 LAB
ANION GAP SERPL CALCULATED.3IONS-SCNC: 11 MMOL/L (ref 3–14)
BUN SERPL-MCNC: 47 MG/DL (ref 7–30)
CALCIUM SERPL-MCNC: 9.3 MG/DL (ref 8.5–10.1)
CHLORIDE SERPL-SCNC: 96 MMOL/L (ref 94–109)
CO2 SERPL-SCNC: 30 MMOL/L (ref 20–32)
CREAT SERPL-MCNC: 5.37 MG/DL (ref 0.66–1.25)
ERYTHROCYTE [DISTWIDTH] IN BLOOD BY AUTOMATED COUNT: 16.3 % (ref 10–15)
GFR SERPL CREATININE-BSD FRML MDRD: 11 ML/MIN/1.7M2
GLUCOSE BLDC GLUCOMTR-MCNC: 130 MG/DL (ref 70–99)
GLUCOSE BLDC GLUCOMTR-MCNC: 155 MG/DL (ref 70–99)
GLUCOSE BLDC GLUCOMTR-MCNC: 169 MG/DL (ref 70–99)
GLUCOSE BLDC GLUCOMTR-MCNC: 196 MG/DL (ref 70–99)
GLUCOSE BLDC GLUCOMTR-MCNC: 208 MG/DL (ref 70–99)
GLUCOSE SERPL-MCNC: 217 MG/DL (ref 70–99)
HCT VFR BLD AUTO: 25.3 % (ref 40–53)
HGB BLD-MCNC: 8 G/DL (ref 13.3–17.7)
MCH RBC QN AUTO: 28.8 PG (ref 26.5–33)
MCHC RBC AUTO-ENTMCNC: 31.6 G/DL (ref 31.5–36.5)
MCV RBC AUTO: 91 FL (ref 78–100)
PLATELET # BLD AUTO: 206 10E9/L (ref 150–450)
POTASSIUM SERPL-SCNC: 3.9 MMOL/L (ref 3.4–5.3)
RBC # BLD AUTO: 2.78 10E12/L (ref 4.4–5.9)
SODIUM SERPL-SCNC: 137 MMOL/L (ref 133–144)
WBC # BLD AUTO: 6.2 10E9/L (ref 4–11)

## 2017-01-19 PROCEDURE — 97166 OT EVAL MOD COMPLEX 45 MIN: CPT | Mod: GO | Performed by: REHABILITATION PRACTITIONER

## 2017-01-19 PROCEDURE — 25000132 ZZH RX MED GY IP 250 OP 250 PS 637: Mod: GY | Performed by: INTERNAL MEDICINE

## 2017-01-19 PROCEDURE — A9270 NON-COVERED ITEM OR SERVICE: HCPCS | Mod: GY | Performed by: INTERNAL MEDICINE

## 2017-01-19 PROCEDURE — 80048 BASIC METABOLIC PNL TOTAL CA: CPT | Performed by: INTERNAL MEDICINE

## 2017-01-19 PROCEDURE — 40000133 ZZH STATISTIC OT WARD VISIT: Performed by: REHABILITATION PRACTITIONER

## 2017-01-19 PROCEDURE — 94640 AIRWAY INHALATION TREATMENT: CPT

## 2017-01-19 PROCEDURE — 97530 THERAPEUTIC ACTIVITIES: CPT | Mod: GP | Performed by: PHYSICAL THERAPIST

## 2017-01-19 PROCEDURE — 25000132 ZZH RX MED GY IP 250 OP 250 PS 637: Mod: GY

## 2017-01-19 PROCEDURE — 00000146 ZZHCL STATISTIC GLUCOSE BY METER IP

## 2017-01-19 PROCEDURE — 12000007 ZZH R&B INTERMEDIATE

## 2017-01-19 PROCEDURE — 94640 AIRWAY INHALATION TREATMENT: CPT | Mod: 76

## 2017-01-19 PROCEDURE — 40000193 ZZH STATISTIC PT WARD VISIT: Performed by: PHYSICAL THERAPIST

## 2017-01-19 PROCEDURE — 99232 SBSQ HOSP IP/OBS MODERATE 35: CPT | Performed by: INTERNAL MEDICINE

## 2017-01-19 PROCEDURE — 25000125 ZZHC RX 250: Performed by: INTERNAL MEDICINE

## 2017-01-19 PROCEDURE — 97161 PT EVAL LOW COMPLEX 20 MIN: CPT | Mod: GP | Performed by: PHYSICAL THERAPIST

## 2017-01-19 PROCEDURE — 40000275 ZZH STATISTIC RCP TIME EA 10 MIN

## 2017-01-19 PROCEDURE — 25000131 ZZH RX MED GY IP 250 OP 636 PS 637: Mod: GY | Performed by: INTERNAL MEDICINE

## 2017-01-19 PROCEDURE — 85027 COMPLETE CBC AUTOMATED: CPT | Performed by: INTERNAL MEDICINE

## 2017-01-19 PROCEDURE — 25000308 HC RX OP HPI UCR WEL MED 250 IP 250: Performed by: INTERNAL MEDICINE

## 2017-01-19 RX ORDER — DIPHENHYDRAMINE HCL 25 MG
25 CAPSULE ORAL EVERY 6 HOURS PRN
Status: DISCONTINUED | OUTPATIENT
Start: 2017-01-19 | End: 2017-01-19

## 2017-01-19 RX ADMIN — SEVELAMER CARBONATE 1600 MG: 800 TABLET, FILM COATED ORAL at 09:16

## 2017-01-19 RX ADMIN — Medication 250 MG: at 09:17

## 2017-01-19 RX ADMIN — ALBUTEROL SULFATE 2.5 MG: 2.5 SOLUTION RESPIRATORY (INHALATION) at 07:50

## 2017-01-19 RX ADMIN — MYCOPHENOLATE MOFETIL 500 MG: 250 CAPSULE ORAL at 09:16

## 2017-01-19 RX ADMIN — HYDRALAZINE HYDROCHLORIDE 10 MG: 20 INJECTION INTRAMUSCULAR; INTRAVENOUS at 02:51

## 2017-01-19 RX ADMIN — CALCIUM ACETATE 2001 MG: 667 CAPSULE ORAL at 09:22

## 2017-01-19 RX ADMIN — INSULIN GLARGINE 26 UNITS: 100 INJECTION, SOLUTION SUBCUTANEOUS at 09:36

## 2017-01-19 RX ADMIN — DARUNAVIR 800 MG: 800 TABLET, FILM COATED ORAL at 20:47

## 2017-01-19 RX ADMIN — PANTOPRAZOLE SODIUM 40 MG: 40 TABLET, DELAYED RELEASE ORAL at 08:57

## 2017-01-19 RX ADMIN — SEVELAMER CARBONATE 1600 MG: 800 TABLET, FILM COATED ORAL at 11:59

## 2017-01-19 RX ADMIN — CALCIUM ACETATE 2001 MG: 667 CAPSULE ORAL at 20:46

## 2017-01-19 RX ADMIN — Medication 1 CAPSULE: at 09:05

## 2017-01-19 RX ADMIN — SEVELAMER CARBONATE 1600 MG: 800 TABLET, FILM COATED ORAL at 17:26

## 2017-01-19 RX ADMIN — LOSARTAN POTASSIUM 100 MG: 100 TABLET, FILM COATED ORAL at 21:24

## 2017-01-19 RX ADMIN — ASPIRIN 81 MG 81 MG: 81 TABLET ORAL at 20:47

## 2017-01-19 RX ADMIN — INSULIN GLARGINE 26 UNITS: 100 INJECTION, SOLUTION SUBCUTANEOUS at 20:46

## 2017-01-19 RX ADMIN — Medication 250 MG: at 20:47

## 2017-01-19 RX ADMIN — ALBUTEROL SULFATE 2.5 MG: 2.5 SOLUTION RESPIRATORY (INHALATION) at 11:29

## 2017-01-19 RX ADMIN — ALBUTEROL SULFATE 2.5 MG: 2.5 SOLUTION RESPIRATORY (INHALATION) at 19:54

## 2017-01-19 RX ADMIN — GABAPENTIN 300 MG: 300 CAPSULE ORAL at 09:05

## 2017-01-19 RX ADMIN — HYDRALAZINE HYDROCHLORIDE 10 MG: 20 INJECTION INTRAMUSCULAR; INTRAVENOUS at 21:54

## 2017-01-19 RX ADMIN — HEPARIN SODIUM 5000 UNITS: 5000 INJECTION, SOLUTION INTRAVENOUS; SUBCUTANEOUS at 09:08

## 2017-01-19 RX ADMIN — ATORVASTATIN CALCIUM 10 MG: 10 TABLET, FILM COATED ORAL at 08:58

## 2017-01-19 RX ADMIN — RITONAVIR 100 MG: 100 TABLET, FILM COATED ORAL at 21:25

## 2017-01-19 RX ADMIN — CHLORHEXIDINE GLUCONATE 15 ML: 1.2 RINSE ORAL at 09:24

## 2017-01-19 RX ADMIN — DAPSONE 100 MG: 25 TABLET ORAL at 21:24

## 2017-01-19 RX ADMIN — CALCIUM ACETATE 2001 MG: 667 CAPSULE ORAL at 12:03

## 2017-01-19 RX ADMIN — MAGNESIUM OXIDE TAB 400 MG (241.3 MG ELEMENTAL MG) 400 MG: 400 (241.3 MG) TAB at 21:24

## 2017-01-19 RX ADMIN — ALBUTEROL SULFATE 2.5 MG: 2.5 SOLUTION RESPIRATORY (INHALATION) at 15:38

## 2017-01-19 RX ADMIN — HYDRALAZINE HYDROCHLORIDE 10 MG: 20 INJECTION INTRAMUSCULAR; INTRAVENOUS at 16:18

## 2017-01-19 RX ADMIN — CLOPIDOGREL 75 MG: 75 TABLET, FILM COATED ORAL at 20:47

## 2017-01-19 RX ADMIN — CARVEDILOL 25 MG: 25 TABLET, FILM COATED ORAL at 09:26

## 2017-01-19 RX ADMIN — CARVEDILOL 25 MG: 25 TABLET, FILM COATED ORAL at 20:47

## 2017-01-19 RX ADMIN — CHLORHEXIDINE GLUCONATE 15 ML: 1.2 RINSE ORAL at 21:24

## 2017-01-19 RX ADMIN — HEPARIN SODIUM 5000 UNITS: 5000 INJECTION, SOLUTION INTRAVENOUS; SUBCUTANEOUS at 20:46

## 2017-01-19 RX ADMIN — GABAPENTIN 600 MG: 300 CAPSULE ORAL at 21:24

## 2017-01-19 RX ADMIN — MYCOPHENOLATE MOFETIL 500 MG: 250 CAPSULE ORAL at 20:46

## 2017-01-19 RX ADMIN — ABACAVIR 600 MG: 300 TABLET ORAL at 20:50

## 2017-01-19 ASSESSMENT — ACTIVITIES OF DAILY LIVING (ADL): PREVIOUS_RESPONSIBILITIES: DRIVING;MEDICATION MANAGEMENT

## 2017-01-19 ASSESSMENT — PAIN DESCRIPTION - DESCRIPTORS: DESCRIPTORS: SORE

## 2017-01-19 NOTE — PROGRESS NOTES
Cognition:  Pt alert and oriented.  Mobility:  Up with assist of two staff with gait belt and walker to bathroom, very weak and unsteady.  Denies dizziness.  Lungs:  Diminished bilaterally with scattered fine crackles.  Productive cough of clear sputum,;  Pt using Yankour suction.  Oxygen saturation 100% on 1 L per nasal cannula.  Heart:   Denies chest pains. Telemetry sinus rhythm.    Urine:  Receives hemodialysis.  Report from ICU received that Pt voided small amount of urine at suppertime.  Bowels:  Loose soft brown stool  Edema:  None  Diet:  Tolerating dialysis diet.  Pain:  Denies  Labs:  Hemoglobin 8.2.    Plan:  Pt safety, telemetry, oxygen therapy, monitor blood sugars.

## 2017-01-19 NOTE — PLAN OF CARE
Problem: Goal Outcome Summary  Goal: Goal Outcome Summary  PT: PT eval completed. Pt had complicated hospital stay with need for intubation. Pt currently on 1.5 liters of O2, dizzy with small amount of activity, 20 feet. RN informed as BP high. PT recommends TCU at D/C as limited tolerance to activity.

## 2017-01-19 NOTE — PROGRESS NOTES
[facility]  Hospitalist Progress Note  Tam Fraser MD, MD 01/19/2017  (Text Page)  Reason for Stay (Diagnosis): respiratory failure, sepsis from bilateral pneumonia         Assessment and Plan:      Summary of Stay: Donald Raza is a 66 year old male with complex medical hx of HAART for hx of HIV, ESRD on HD, CAD, Hypertension, DM type 2 on insulin, chronic anemia  admitted on 1/3/2017 with episodes of chest pain and was determined non cardiac in etiology given heart cath findings this admit. Hospital course then complicated by intermittent fever spikes ( similar to previous hospitalizations) and increasing O2 requirements that eventually led to severe respiratory distress, unresponsiveness and a CODE blue on 1/7 resulting in intubation/ventilation.  Course complicated by failed attempt at extubation on 1/9 with re-intubation 3 hours later.  he then self extubated on 1/10 again requiring re-intubation.  Has now been doing well after extubation on 1/14.    Problem List:    1. Chest pain with known coronary artery disease.  Cardiology consult appreciated. Has known CAD.  Angio done 1/4/17.  No significant lesions noted.  Continue ASA, Coreg, Plavix, Cozaar, Lipitor.  2. Acute respiratory failure secondary to bilateral Pneumonia- s/p extubation, resolved.  Vanco stopped 1/16.  Levaquin stopped 1/17.  Zosyn stopped as well. Doing well off antibiotics. Low suspicion for opportunistic infection. Cultures had no growth. Appreciate ID input  3. End stage kidney failure.  Hemodialysis per Nephrology.  Continue Phoslo.  4. Acute on chronic anemia.  Hgb 8.0  And stable.  Hemoccult positive.  GI following.  They recommend follow up in the outpatient setting.  Monitor. No obvious source of bleeding.   5. HIV.  Continue Ziagen, Prezista, Tivicay, and Norvir.  Dapsone.  6. HTN.  Continue Cozaar, Coreg.  7. Diabetes Mellitus.  Continue Lantus, Novolog SSI.  8. Weakness.  PT and OT consults.  9. Diarrhea.  C  "diff toxin negative.  on probiotic.    DVT Prophylaxis: Heparin SQ  Code Status: Full Code  Discharge Dispo: PT and OT consult.  continue inpatient care.   Estimated Disch Date / # of Days until Disch: likely in the next 24-36 hours          Interval History (Subjective):      Assumed care today.  Seen and examined.  Multiple family members at bedside.  Donald is feeling better and has no ongoing complaints.  Earlier he was able to ambulate but felt a bit winded.  Tolerating oral diet. Remained afebrile. No diarrhea.                  Physical Exam:      Last Vital Signs:  /64 mmHg  Pulse 84  Temp(Src) 97.8  F (36.6  C) (Oral)  Resp 16  Ht 1.803 m (5' 11\")  Wt 81.012 kg (178 lb 9.6 oz)  BMI 24.92 kg/m2  SpO2 95%    I/O last 3 completed shifts:  In: 513 [P.O.:350; I.V.:163]  Out: -   Wt Readings from Last 1 Encounters:   01/19/17 81.012 kg (178 lb 9.6 oz)     Filed Vitals:    01/15/17 0523 01/16/17 0445 01/16/17 0730 01/18/17 0600   Weight: 80.6 kg (177 lb 11.1 oz) 80.9 kg (178 lb 5.6 oz) 80.9 kg (178 lb 5.6 oz) 79.1 kg (174 lb 6.1 oz)    01/19/17 0234   Weight: 81.012 kg (178 lb 9.6 oz)       Constitutional: Awake, alert, cooperative, no apparent distress   Respiratory: Clear to auscultation bilaterally, no crackles or wheezing   Cardiovascular: Regular rate and rhythm, normal S1 and S2, and no murmur noted   Abdomen: Normal bowel sounds, soft, non-distended, non-tender   Skin: No rashes, no cyanosis, dry to touch   Neuro: Alert and oriented x3, no weakness, spontaneous and coherent speech   Extremities: No edema, normal range of motion, Picc line well dressed in R UE   Other(s): Euthymic mood, not agitated       All other systems: Negative          Medications:      All current medications were reviewed with changes reflected in problem list.         Data:      All new lab and imaging data was reviewed.   Labs:  No results for input(s): CULT in the last 168 hours.    Recent Labs  Lab 01/19/17  0541 " 01/18/17  0630 01/17/17  0523 01/16/17  0450   WBC 6.2  --  10.5 10.2   HGB 8.0* 8.2* 8.4* 5.9*   HCT 25.3*  --  26.8* 19.6*   MCV 91  --  90 92     --  198 164       Recent Labs  Lab 01/19/17  0541 01/18/17  0630 01/17/17  0523 01/16/17  0450     --  140 143   POTASSIUM 3.9 3.7 3.5 3.7   CHLORIDE 96  --  99 99   CO2 30  --  31 29   ANIONGAP 11  --  10 15*   *  --  77 100*   BUN 47*  --  43* 104*   CR 5.37*  --  4.35* 7.56*   GFRESTIMATED 11*  --  14* 7*   GFRESTBLACK 13*  --  16* 9*   PRABHA 9.3  --  9.3 10.1   MAG  --  2.3  --  2.5*   PHOS  --   --   --  7.1*   PROTTOTAL  --   --  7.3  --    ALBUMIN  --   --  2.6* 2.4*   BILITOTAL  --   --  0.7  --    ALKPHOS  --   --  54  --    AST  --   --  29  --    ALT  --   --  48  --        Recent Labs  Lab 01/19/17  1721 01/19/17  1154 01/19/17  0841 01/19/17  0541 01/19/17  0229 01/18/17  2145  01/17/17  0523  01/16/17  0450  01/15/17  0530   GLC  --   --   --  217*  --   --   --  77  --  100*  --  109*   * 196* 169*  --  130* 225*  < >  --   < >  --   < >  --    < > = values in this interval not displayed.  No results for input(s): TROPONIN, TROPI, TROPR in the last 168 hours.    Invalid input(s): TROP, TROPONINIES   Imaging:   No results found for this or any previous visit (from the past 48 hour(s)).

## 2017-01-19 NOTE — PLAN OF CARE
Problem: Goal Outcome Summary  Goal: Goal Outcome Summary  OT:  dimitry complete.  Pt is a 66 year old male with complex medical hx of HAART for hx of HIV, ESRD on HD, CAD, Hypertension, DM type 2 on insulin, chronic anemia  admitted on 1/3/2017 with episodes of chest pain and was determined non cardiac in etiology given heart cath findings this admit. Hospital course then complicated by intermittent fever spikes ( similar to previous hospitalizations) and increasing O2 requirements that eventually led to severe respiratory distress, unresponsiveness and a CODE blue on 1/7 resulting in intubation/ventilation.  Course complicated by failed attempt at extubation on 1/9 with re-intubation 3 hours later.  he then self extubated on 1/10 again requiring re-intubation.  Has now been doing well after extubation on 1/14.  Pt is now doing dialysis every other day and would like therapy to alternate as he doesn't feel he can tolerate activity on dialysis days.  Family present and supportive in hospital and at home. On this date pt alert, oriented and able to follow commands.  CGA-min A for sit<>stand and transfers bed<>chair.  Set-up and SBA for grooming/hygiene while seated.  Pt declined further activity upon arrival of lunch and visiting with family.  Anticipate TCU vs home with assist and Home OT pending progress.

## 2017-01-19 NOTE — PROGRESS NOTES
"Pt received a SVN x 2 (Albuterol 2.5 mg) via the aerosol mask and tolerated it well with no adverse reaction noted. He is currently on a 1.5 LPM NC and is sating 94-96%, BBS are diminished, he has a good strong NPC, he's able to do the Acapella on his own and puts great effort into doing it. Will continue to monitor and assess.    Vital signs:  Temp: 96.9  F (36.1  C) Temp src: Axillary BP: 186/61 mmHg Pulse: 84 Heart Rate: 82 Resp: 16 SpO2: 96 % O2 Device: Nasal cannula Oxygen Delivery: 1.5 LPM Height: 180.3 cm (5' 11\") Weight: 81.012 kg (178 lb 9.6 oz)  Estimated body mass index is 24.92 kg/(m^2) as calculated from the following:    Height as of this encounter: 1.803 m (5' 11\").    Weight as of this encounter: 81.012 kg (178 lb 9.6 oz).      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, recurrent     Hypertension 2010     CKD (chronic kidney disease)      Hemodialysis     Mixed hyperlipidemia      Coronary artery disease      stents     NSTEMI (non-ST elevated myocardial infarction) (H) 12/2015, 5/2016     Pulmonary HTN (H)      Mod     Near syncope 2016     with hemodialysis     TIA (transient ischemic attack) 5/2016     Increased prostate specific antigen (PSA) velocity 08/08/2016     Awaiting bx on blood thinner     Bilateral pneumonia 1/7/2017       PAST SURGICAL HISTORY:   Past Surgical History   Procedure Laterality Date     Cholecystectomy, laporoscopic       Colostomy  09/30/1999     Temporary for diverticulitis     Appendectomy  2000     Angiogram  03-04-16     No culprit lesions, stents widely patent      Angiogram  05-06-16     Cutting balloon ptca=Diag     Stent, coronary, s660 15/18  12/2015     VANITA=Diag, PTCA=LAD     Stent, coronary, s660 15/18  06/2015     VANITA=LAD     Heart " cath, angioplasty  08-18-16     LAD PCI. Stented with a 3.0 x 8 mm Xience Alpine stent.       FAMILY HISTORY:   Family History   Problem Relation Age of Onset     HEART DISEASE Brother 40     CABG     KIDNEY DISEASE Sister      Hypertension Sister      HEART DISEASE Brother      Dilated aorta       SOCIAL HISTORY:   Social History   Substance Use Topics     Smoking status: Former Smoker     Types: Cigarettes     Smokeless tobacco: Not on file     Alcohol Use: No     Pepe Whitfield RT  1/19/2017

## 2017-01-19 NOTE — PROGRESS NOTES
"GASTROENTEROLOGY PROGRESS NOTE        SUBJECTIVE:  No abdominal pain, nausea/ vomiting. 2 small BMs yesterday- no melena or hematochezia reported. Tolerating regular diet.      OBJECTIVE:    /40 mmHg  Pulse 84  Temp(Src) 96.9  F (36.1  C) (Axillary)  Resp 16  Ht 1.803 m (5' 11\")  Wt 81.012 kg (178 lb 9.6 oz)  BMI 24.92 kg/m2  SpO2 94%  Temp (24hrs), Av  F (36.7  C), Min:97.5  F (36.4  C), Max:98.4  F (36.9  C)    Patient Vitals for the past 72 hrs:   Weight   17 0234 81.012 kg (178 lb 9.6 oz)   17 0600 79.1 kg (174 lb 6.1 oz)       Intake/Output Summary (Last 24 hours) at 17 0950  Last data filed at 17 0900   Gross per 24 hour   Intake   1470 ml   Output    300 ml   Net   1170 ml        PHYSICAL EXAM     Constitutional: Up in bedside chair  Abdomen: soft, NTTP        Additional Comments:  ROS, FH, SH: See initial GI consult for details.     I have reviewed the patient's new clinical lab results:     Recent Labs   Lab Test  1730  17   0450   17   0636   08/15/16   2353   16   0928   WBC  6.2   --   10.5  10.2   < >  4.7   < >  5.0   < >  5.3   HGB  8.0*  8.2*  8.4*  5.9*   < >  6.9*   < >  7.7*   < >  7.3*   MCV  91   --   90  92   < >  90   < >  92   < >  94   PLT  206   --   198  164   < >  93*   < >  159   < >  150   INR   --    --    --    --    --   1.41*   --   0.99   --   1.08    < > = values in this interval not displayed.     Recent Labs   Lab Test  1741  1730  17   0517   0450   POTASSIUM  3.9  3.7  3.5  3.7   CHLORIDE  96   --   99  99   CO2  30   --   31  29   BUN  47*   --   43*  104*   ANIONGAP  11   --   10  15*     Recent Labs   Lab Test  17   0523  17   0450  17   0526  17   0544   16   0525   16   2030   16   1730   12/03/15   0824   14   2255   ALBUMIN  2.6*  2.4*  2.2*  2.2*   < >   --    < >   --    < >   --   "  < >  3.2*   < >  3.6   BILITOTAL  0.7   --   0.8  0.7   < >   --    < >   --    < >   --    < >  0.5   < >  0.5   ALT  48   --   152*  264*   < >   --    < >   --    < >   --    < >  17   < >  17   AST  29   --   66*  224*   < >   --    < >   --    < >   --    < >  18   < >  18   PROTEIN   --    --    --    --    --   300*   --   >600*   --   300*   --    --    < >   --    LIPASE   --    --    --    --    --    --    --    --    --    --    --   194   --   243    < > = values in this interval not displayed.        ASSESSMENT/ PLAN  Donald Raza is a 66 year old with complex medical history including coronary artery disease (status post multiple coronary stents most recent drug-eluting stent in December 2015), ESRD on hemodialysis, HIV, DM, chronic anemia who was admitted 1/3/2017 with chest pain found to have pneumonia.    1. Acute on chronic anemia: Normocytic, normochromic per peripheral smear. Hgb down 2 grams from baseline, he was transfused 2 units with Hgb today at 8.4. Multifactorial etiology given ESRD/ HD, HIV, current infection, Plavix, gi bleed. Previous colonoscopy in 2012 with 9 mm polyp removed but cannot locate pathology. He did undergo angiogram on 1/4 without intervention. No melena or hematochezia. HgB stable around 8.     - Given recent pneumonia/ intubation without evidence of occult bleeding, would not recommend endoscopy at this time.  -  As Hgb is stable at 8, suggest outpatient clinic visit to discuss colonoscopy/ EGD with Katarina IGLESIAS. He is followed by them and had a previous colon polyp seen in 2012. Patient is now five months post last VANITA placement. He continues on Plavix.     - Serial Hgb, transfusions per medicine  - If occult bleeding identified, could consider EGD vs tagged RBC study. Document stool output.  - C.diff negative    Will no longer follow, please call with questions or change in condition.    Gisela Ding PA-C  [facility]

## 2017-01-19 NOTE — PLAN OF CARE
Problem: Goal Outcome Summary  Goal: Goal Outcome Summary  Outcome: Improving  Patient up with assist of one and walker to bathroom. Showered this morning. Appetite good. No c/o pain.

## 2017-01-19 NOTE — PROGRESS NOTES
01/19/17 1434   Quick Adds   Type of Visit Initial PT Evaluation   Living Environment   Lives With spouse   Living Arrangements house   Home Accessibility stairs to enter home;stairs within home   Number of Stairs to Enter Home 1   Number of Stairs Within Home 5   Stair Railings at Home inside, present on right side   Transportation Available car;family or friend will provide   Living Environment Comment Pt has FWW at home, but does not use.    Self-Care   Usual Activity Tolerance good   Current Activity Tolerance fair   Regular Exercise no   Equipment Currently Used at Home none   Functional Level Prior   Ambulation 0-->independent   Transferring 0-->independent   Toileting 0-->independent   Bathing 0-->independent   Dressing 0-->independent   Eating 0-->independent   Communication 0-->understands/communicates without difficulty   Swallowing 0-->swallows foods/liquids without difficulty   Cognition 0 - no cognition issues reported   Fall history within last six months no   Prior Functional Level Comment Pt stated he is I with self cares   General Information   Onset of Illness/Injury or Date of Surgery - Date 01/03/17   Referring Physician Dr. Tolliver   Patient/Family Goals Statement Pt would like to improve his tolerance to activity   Pertinent History of Current Problem (include personal factors and/or comorbidities that impact the POC) Donald Raza is a 66 year old male with complex medical hx of HAART for hx of HIV, ESRD on HD, CAD, Hypertension, DM type 2 on insulin, chronic anemia  admitted on 1/3/2017 with episodes of chest pain and was determined non cardiac in etiology given heart cath findings this admit. Hospital course then complicated by intermittent fever spikes ( similar to previous hospitalizations) and increasing O2 requirements that eventually led to severe respiratory distress, unresponsiveness and a CODE blue on 1/7 resulting in intubation/ventilation.  Course complicated by failed attempt  at extubation on 1/9 with re-intubation 3 hours later.  he then self extubated on 1/10 again requiring re-intubation.  Has now been doing well after extubation on 1/14.   Precautions/Limitations oxygen therapy device and L/min;fall precautions   General Info Comments See vitals flowsheet   Cognitive Status Examination   Orientation orientation to person, place and time   Level of Consciousness alert   Follows Commands and Answers Questions 100% of the time;able to follow multistep instructions   Personal Safety and Judgment intact   Memory intact   Pain Assessment   Patient Currently in Pain No   Posture    Posture Forward head position;Protracted shoulders   Range of Motion (ROM)   ROM Comment B limited hamstring length   Strength   Strength Comments decreased strength generally with ambulation, strength 4/5 with testing   Bed Mobility   Bed Mobility Comments SBA   Transfer Skills   Transfer Comments CGA with FWW   Gait   Gait Comments CGA with FWW, min A without AD. Limited ambulation 25 feet. Dizzy following, see BP increased BP, RN updated. Sats stable on 2 liters   Balance   Balance Comments unsteady without FWW   General Therapy Interventions   Planned Therapy Interventions balance training;gait training;strengthening;transfer training;risk factor education;progressive activity/exercise   Clinical Impression   Criteria for Skilled Therapeutic Intervention yes, treatment indicated   PT Diagnosis decreased functional mobility status post respiratory distress   Influenced by the following impairments SOB/Dizziness with activity, abnormal CV response with activity, decreased functional strength   Functional limitations due to impairments decreased transfers, ambulation   Clinical Presentation Stable/Uncomplicated   Clinical Presentation Rationale improving from respiratory distress   Clinical Decision Making (Complexity) Low complexity   Therapy Frequency` 5 times/week   Predicted Duration of Therapy Intervention  "(days/wks) 1 week   Anticipated Discharge Disposition Transitional Care Facility   Risk & Benefits of therapy have been explained Yes   Patient, Family & other staff in agreement with plan of care Yes   [facility] TM \"6 Clicks\"   2016, Trustees of [facility], under license to [employer].  All rights reserved.   6 Clicks Short Forms Basic Mobility Inpatient Short Form   [facility] AM-PAC  \"6 Clicks\" V.2 Basic Mobility Inpatient Short Form   1. Turning from your back to your side while in a flat bed without using bedrails? 4 - None   2. Moving from lying on your back to sitting on the side of a flat bed without using bedrails? 4 - None   3. Moving to and from a bed to a chair (including a wheelchair)? 3 - A Little   4. Standing up from a chair using your arms (e.g., wheelchair, or bedside chair)? 3 - A Little   5. To walk in hospital room? 2 - A Lot   6. Climbing 3-5 steps with a railing? 2 - A Lot   Basic Mobility Raw Score (Score out of 24.Lower scores equate to lower levels of function) 18   Total Evaluation Time   Total Evaluation Time (Minutes) 10     "

## 2017-01-19 NOTE — PROGRESS NOTES
[facility]  Infectious Disease Progress Note          Assessment and Plan:   IMPRESSION:    1.  A 66-year-old male, very complex medical history, known to me from prior admission, currently is admitted with chest pain, rule out coronary artery disease, now has developed some increased cough and respiratory symptoms, although chest x-ray is negative.  New acute fever in the hospital as well, question respiratory infection, although not initially obvious, no leukocytosis, not really productive sputum and again chest x-ray negative.    2.  Chronic human immunodeficiency virus infection for over 30 years, treated at [facility], most recent T cells were 3 weeks ago at which time T cells were 263 and undetectable virus, i.e., unlikely to have an opportunistic infection, is on a fairly complicated 5-drug regimen, but well controlled.    3.  Known coronary disease, does not appear he has that as an explanation for current symptoms.    4.  Diabetes mellitus.    5.  End-stage renal disease on chronic dialysis on the transplant list.    6.  Sulfa allergy.    7.  Nephrolithiasis.   8 Resp failure, infiltrate presumed pneumonia, intubated in ICU       RECOMMENDATIONS:    1.  Continue his current antiretroviral therapy, long term, FU with  Regular [facility] MDs   2.  Extubated and looks well off antibiotics and watch            Interval History:   Extubated and awake out of ICU,improved overall No + cxs sputum neg  No fever O2 OK              Medications:       saccharomyces boulardii  250 mg Oral BID     albuterol  2.5 mg Nebulization 4x daily     insulin aspart  1-10 Units Subcutaneous TID AC     insulin aspart  1-7 Units Subcutaneous At Bedtime     pantoprazole  40 mg Oral QAM     gabapentin  300 mg Oral QAM     gabapentin  600 mg Oral QPM     B complex-C-folic acid  1 capsule Oral Daily     atorvastatin  10 mg Oral Daily     sevelamer  1,600 mg Oral TID w/meals     insulin glargine  26 Units  "Subcutaneous BID     aspirin chewable tablet 81 mg  81 mg Oral QPM     carvedilol  25 mg Oral BID w/meals     dapsone  100 mg Oral At Bedtime     losartan  100 mg Oral At Bedtime     magnesium oxide (MAG-OX) tablet 400 mg  400 mg Oral At Bedtime     mycophenolate  500 mg Oral BID     heparin  5,000 Units Subcutaneous BID     sodium chloride (PF)  10 mL Intracatheter Q7 Days     - MEDICATION INSTRUCTIONS for Dialysis Patients -   Does not apply See Admin Instructions     sodium chloride (PF)  3 mL Intracatheter Q8H     abacavir  600 mg Oral QPM     calcium acetate  2,001 mg Oral TID w/meals     chlorhexidine  15 mL Swish & Spit BID     clopidogrel (PLAVIX) tablet 75 mg  75 mg Oral QPM     darunavir  800 mg Oral At Bedtime     dolutegravir  50 mg Oral At Bedtime     ritonavir  100 mg Oral At Bedtime                  Physical Exam:   Blood pressure 186/61, pulse 84, temperature 96.9  F (36.1  C), temperature source Axillary, resp. rate 16, height 1.803 m (5' 11\"), weight 81.012 kg (178 lb 9.6 oz), SpO2 94 %.  Wt Readings from Last 2 Encounters:   01/19/17 81.012 kg (178 lb 9.6 oz)   11/28/16 88.905 kg (196 lb)     Vital Signs with Ranges  Temp:  [96.9  F (36.1  C)-99.4  F (37.4  C)] 96.9  F (36.1  C)  Pulse:  [84] 84  Heart Rate:  [64-87] 82  Resp:  [11-29] 16  BP: ()/(37-80) 186/61 mmHg  SpO2:  [93 %-100 %] 94 %    Constitutional: extubated awake and interactive   Lungs: Congestion to auscultation bilaterally, no crackles or wheezing   Cardiovascular: Regular rate and rhythm, normal S1 and S2, and no murmur noted   Abdomen: Normal bowel sounds, soft, non-distended, non-tender   Skin: No rashes, no cyanosis, no edema   Other:           Data:   All microbiology laboratory data reviewed.  Recent Labs   Lab Test  01/19/17   0541  01/18/17   0630  01/17/17   0523  01/16/17   0450   WBC  6.2   --   10.5  10.2   HGB  8.0*  8.2*  8.4*  5.9*   HCT  25.3*   --   26.8*  19.6*   MCV  91   --   90  92   PLT  206   --   198  " 164     Recent Labs   Lab Test  01/19/17   0541  01/17/17   0523  01/16/17   0450   CR  5.37*  4.35*  7.56*     No lab results found.  Recent Labs   Lab Test  01/07/17   1150  01/05/17 2012 01/05/17   2000  05/19/16   1730  03/05/16   2248  03/05/16   2247  03/05/16   2204  03/05/16   1115  07/06/14   2330   CULT  No growth  Incorrectly ordered by PCU/Clinic Canceled, Test credited ordered sputum culture   instead.    No growth  No growth  No growth  Test canceled by physician Duplicate request Charge credited  Test canceled by physician Duplicate request Charge credited  Test canceled by physician Duplicate request Charge credited  Test canceled by physician Duplicate request Charge credited  No growth  No growth  Charge credited Specimen not received Test canceled by PCU/Clinic  No growth

## 2017-01-19 NOTE — PROGRESS NOTES
01/19/17 1306   Quick Adds   Type of Visit Initial Occupational Therapy Evaluation   Living Environment   Lives With spouse   Living Arrangements house   Home Accessibility stairs to enter home;stairs within home   Number of Stairs to Enter Home 1   Number of Stairs Within Home 5   Stair Railings at Home inside, present on right side   Transportation Available car;family or friend will provide   Living Environment Comment Pt reported to be independent in mobility at baseline.  Has a walker, but doesn't use.  Wife does all cooking, homemaking and drives most of the time.   Self-Care   Dominant Hand right   Usual Activity Tolerance good   Current Activity Tolerance fair   Regular Exercise no   Equipment Currently Used at Home none   Activity/Exercise/Self-Care Comment Pt reported to be independent in ADLs.  Reported to be feeling more unsteady lately.   Functional Level Prior   Ambulation 0-->independent   Transferring 0-->independent   Toileting 0-->independent   Bathing 0-->independent   Dressing 0-->independent   Eating 0-->independent   Communication 0-->understands/communicates without difficulty   Swallowing 0-->swallows foods/liquids without difficulty   Cognition 0 - no cognition issues reported   Fall history within last six months no       Present no   General Information   Onset of Illness/Injury or Date of Surgery - Date 01/03/17   Referring Physician Dr. Marquez Tolliver   Patient/Family Goals Statement Return to home.   Additional Occupational Profile Info/Pertinent History of Current Problem Pt is a 66 year old male with complex medical hx of HAART for hx of HIV, ESRD on HD, CAD, Hypertension, DM type 2 on insulin, chronic anemia  admitted on 1/3/2017 with episodes of chest pain and was determined non cardiac in etiology given heart cath findings this admit. Hospital course then complicated by intermittent fever spikes ( similar to previous hospitalizations) and increasing O2  requirements that eventually led to severe respiratory distress, unresponsiveness and a CODE blue on 1/7 resulting in intubation/ventilation.  Course complicated by failed attempt at extubation on 1/9 with re-intubation 3 hours later.  he then self extubated on 1/10 again requiring re-intubation.  Has now been doing well after extubation on 1/14.  Pt is now doing dialysis every other day and would like therapy to alternate as he doesn't feel he can tolerate activity on dialysis days.  Family present and supportive in hospital and at home.   Precautions/Limitations fall precautions   General Info Comments activity:  up ad hanaen   Cognitive Status Examination   Orientation orientation to person, place and time   Level of Consciousness alert   Able to Follow Commands WNL/WFL   Personal Safety (Cognitive) WNL/WFL   Memory intact   Attention No deficits were identified   Organization/Problem Solving No deficits were identified   Executive Function No deficits were identified   Cognitive Comment friendly, talkative, enjoys joking   Visual Perception   Visual Perception Wears glasses   Sensory Examination   Sensory Quick Adds No deficits were identified   Pain Assessment   Patient Currently in Pain No   Integumentary/Edema   Integumentary/Edema no deficits were identifed   Posture   Posture not impaired   Range of Motion (ROM)   ROM Quick Adds No deficits were identified   ROM Comment B UE AROM WFL   Strength   Manual Muscle Testing Quick Adds Other   Strength Comments B UE strength grossly 4/5   Hand Strength   Hand Strength Comments WFL   Muscle Tone Assessment   Muscle Tone Quick Adds No deficits were identified   Coordination   Upper Extremity Coordination No deficits were identified   Transfer Skill: Bed to Chair/Chair to Bed   Level of [place]: Bed to Chair contact guard   Physical Assist/Nonphysical Assist: Bed to Chair verbal cues   Assistive Device - Transfer Skill Bed to Chair Chair to Bed Rehab Eval rolling  "walker   Transfer Skill: Sit to Stand   Level of [place]: Sit/Stand minimum assist (75% patients effort)   Physical Assist/Nonphysical Assist: Sit/Stand verbal cues   Assistive Device for Transfer: Sit/Stand rolling walker   Grooming   Level of [place]: Grooming stand-by assist   Physical Assist/Nonphysical Assist: Grooming set-up required  (sitting up in chair)   Eating/Self Feeding   Level of [place]: Eating independent   Physical Assist/Nonphysical Assist: Eating set-up required   Instrumental Activities of Daily Living (IADL)   Previous Responsibilities driving;medication management   General Therapy Interventions   Planned Therapy Interventions ADL retraining;strengthening;transfer training   Clinical Impression   Criteria for Skilled Therapeutic Interventions Met yes, treatment indicated   OT Diagnosis decreased ADL performance   Influenced by the following impairments decreased strength, endurance, balance   Assessment of Occupational Performance 3-5 Performance Deficits   Identified Performance Deficits Pt with decreased ability to bathe, toilet, complete grooming/hygiene in standing.  Increased difficulty with LE dressing and fatigue.   Clinical Decision Making (Complexity) Moderate complexity   Therapy Frequency other (see comments)  (every other day opposite dialysis)   Predicted Duration of Therapy Intervention (days/wks) 1 week   Anticipated Equipment Needs at Discharge shower chair   Anticipated Discharge Disposition Home with Assist;Home with Home Therapy   Risks and Benefits of Treatment have been explained. Yes   Patient, Family & other staff in agreement with plan of care Yes   [facility] [employer]-PAC TM \"6 Clicks\"   2016, Trustees of [facility], under license to [employer].  All rights reserved.   6 Clicks Short Forms Daily Activity Inpatient Short Form   [facility] AM-PAC  \"6 Clicks\" Daily Activity Inpatient Short Form   1. Putting on and taking off regular lower " body clothing? 2 - A Lot   2. Bathing (including washing, rinsing, drying)? 2 - A Lot   3. Toileting, which includes using toilet, bedpan or urinal? 3 - A Little   4. Putting on and taking off regular upper body clothing? 3 - A Little   5. Taking care of personal grooming such as brushing teeth? 3 - A Little   6. Eating meals? 4 - None   Daily Activity Raw Score (Score out of 24.Lower scores equate to lower levels of function) 17   Total Evaluation Time   Total Evaluation Time (Minutes) 10

## 2017-01-19 NOTE — PLAN OF CARE
Problem: Goal Outcome Summary  Goal: Goal Outcome Summary  Outcome: Improving  Hypertensive at 178/65, PRN hydralazine given, recheck /57. A&O. Up with Ax1-2 with walker to bedside commode. Bed alarm on. Denies pain. LUE fistula, WNL. Right PICC. Tele SR. L/S diminished. FRANCO. Diarrhea, excoriated jayden rectum, barrier cream applied. Cdiff negative. On probiotics. HS  and 0200 . ID follows, continue antiretroviral meds, d/c'ed abx, monitor off. GI recommending OP c-scope. OT and PT to evaluate. Likely d/c to TCU 1-2 days.

## 2017-01-19 NOTE — PROGRESS NOTES
"Date:1/18/2017  Admission Dx:chest pain/ pna  Pulmonary History: pulm htn  Home Nebulizer/MDI Use: n/a  Home Oxygen:n/a  Acuity Level (RCAT flow sheet): level 3/ QID    Aerosol Therapy initiated: Albuterol QID/Q2 prn    Pulmonary Hygiene initiated: Aerobika QID    Volume Expansion initiated:  Incentive Spirometer    Current Oxygen Requirements: 1LPM Nc    Current SpO2:96%    Re-evaluation date: 21 January 2017    Patient Education: Patient informed of medication benefits and possible side effects.    See \"RT Assessments\" flow sheet for patient assessment scoring and Acuity Level Details.     Romina Gerard RT  1/18/2017          "

## 2017-01-20 ENCOUNTER — APPOINTMENT (OUTPATIENT)
Dept: PHYSICAL THERAPY | Facility: CLINIC | Age: 67
DRG: 286 | End: 2017-01-20
Payer: MEDICARE

## 2017-01-20 LAB
GLUCOSE BLDC GLUCOMTR-MCNC: 137 MG/DL (ref 70–99)
GLUCOSE BLDC GLUCOMTR-MCNC: 140 MG/DL (ref 70–99)
GLUCOSE BLDC GLUCOMTR-MCNC: 275 MG/DL (ref 70–99)
GLUCOSE BLDC GLUCOMTR-MCNC: 74 MG/DL (ref 70–99)
HGB BLD-MCNC: 8.5 G/DL (ref 13.3–17.7)
MAGNESIUM SERPL-MCNC: 2.7 MG/DL (ref 1.6–2.3)

## 2017-01-20 PROCEDURE — A9270 NON-COVERED ITEM OR SERVICE: HCPCS | Mod: GY | Performed by: INTERNAL MEDICINE

## 2017-01-20 PROCEDURE — 25000131 ZZH RX MED GY IP 250 OP 636 PS 637: Mod: GY | Performed by: INTERNAL MEDICINE

## 2017-01-20 PROCEDURE — 83735 ASSAY OF MAGNESIUM: CPT | Performed by: INTERNAL MEDICINE

## 2017-01-20 PROCEDURE — 12000007 ZZH R&B INTERMEDIATE

## 2017-01-20 PROCEDURE — 63400005 ZZH RX 634: Performed by: INTERNAL MEDICINE

## 2017-01-20 PROCEDURE — 94640 AIRWAY INHALATION TREATMENT: CPT

## 2017-01-20 PROCEDURE — 90937 HEMODIALYSIS REPEATED EVAL: CPT

## 2017-01-20 PROCEDURE — 25000308 HC RX OP HPI UCR WEL MED 250 IP 250: Performed by: INTERNAL MEDICINE

## 2017-01-20 PROCEDURE — 25000132 ZZH RX MED GY IP 250 OP 250 PS 637: Mod: GY | Performed by: INTERNAL MEDICINE

## 2017-01-20 PROCEDURE — 00000146 ZZHCL STATISTIC GLUCOSE BY METER IP

## 2017-01-20 PROCEDURE — 40000275 ZZH STATISTIC RCP TIME EA 10 MIN

## 2017-01-20 PROCEDURE — 85018 HEMOGLOBIN: CPT | Performed by: INTERNAL MEDICINE

## 2017-01-20 PROCEDURE — 25000132 ZZH RX MED GY IP 250 OP 250 PS 637: Mod: GY

## 2017-01-20 PROCEDURE — 40000193 ZZH STATISTIC PT WARD VISIT: Performed by: PHYSICAL THERAPIST

## 2017-01-20 PROCEDURE — 25000128 H RX IP 250 OP 636: Performed by: INTERNAL MEDICINE

## 2017-01-20 PROCEDURE — 97530 THERAPEUTIC ACTIVITIES: CPT | Mod: GP | Performed by: PHYSICAL THERAPIST

## 2017-01-20 PROCEDURE — 94640 AIRWAY INHALATION TREATMENT: CPT | Mod: 76

## 2017-01-20 PROCEDURE — 97161 PT EVAL LOW COMPLEX 20 MIN: CPT | Mod: GP | Performed by: PHYSICAL THERAPIST

## 2017-01-20 PROCEDURE — 25000125 ZZHC RX 250: Performed by: INTERNAL MEDICINE

## 2017-01-20 PROCEDURE — 99232 SBSQ HOSP IP/OBS MODERATE 35: CPT | Performed by: INTERNAL MEDICINE

## 2017-01-20 RX ADMIN — CALCIUM ACETATE 2001 MG: 667 CAPSULE ORAL at 16:08

## 2017-01-20 RX ADMIN — Medication 250 MG: at 21:19

## 2017-01-20 RX ADMIN — CLOPIDOGREL 75 MG: 75 TABLET, FILM COATED ORAL at 21:19

## 2017-01-20 RX ADMIN — MYCOPHENOLATE MOFETIL 500 MG: 250 CAPSULE ORAL at 21:18

## 2017-01-20 RX ADMIN — ERYTHROPOIETIN 10000 UNITS: 10000 INJECTION, SOLUTION INTRAVENOUS; SUBCUTANEOUS at 12:39

## 2017-01-20 RX ADMIN — INSULIN GLARGINE 26 UNITS: 100 INJECTION, SOLUTION SUBCUTANEOUS at 09:39

## 2017-01-20 RX ADMIN — MAGNESIUM OXIDE TAB 400 MG (241.3 MG ELEMENTAL MG) 400 MG: 400 (241.3 MG) TAB at 21:17

## 2017-01-20 RX ADMIN — CHLORHEXIDINE GLUCONATE 15 ML: 1.2 RINSE ORAL at 08:18

## 2017-01-20 RX ADMIN — DARUNAVIR 800 MG: 800 TABLET, FILM COATED ORAL at 21:18

## 2017-01-20 RX ADMIN — PANTOPRAZOLE SODIUM 40 MG: 40 TABLET, DELAYED RELEASE ORAL at 08:18

## 2017-01-20 RX ADMIN — ALBUTEROL SULFATE 2.5 MG: 2.5 SOLUTION RESPIRATORY (INHALATION) at 08:14

## 2017-01-20 RX ADMIN — INSULIN GLARGINE 26 UNITS: 100 INJECTION, SOLUTION SUBCUTANEOUS at 21:33

## 2017-01-20 RX ADMIN — GABAPENTIN 600 MG: 300 CAPSULE ORAL at 21:18

## 2017-01-20 RX ADMIN — HEPARIN SODIUM 5000 UNITS: 5000 INJECTION, SOLUTION INTRAVENOUS; SUBCUTANEOUS at 08:18

## 2017-01-20 RX ADMIN — ALBUTEROL SULFATE 2.5 MG: 2.5 SOLUTION RESPIRATORY (INHALATION) at 20:27

## 2017-01-20 RX ADMIN — LOSARTAN POTASSIUM 100 MG: 100 TABLET, FILM COATED ORAL at 21:18

## 2017-01-20 RX ADMIN — GABAPENTIN 300 MG: 300 CAPSULE ORAL at 08:17

## 2017-01-20 RX ADMIN — DAPSONE 100 MG: 25 TABLET ORAL at 21:18

## 2017-01-20 RX ADMIN — ATORVASTATIN CALCIUM 10 MG: 10 TABLET, FILM COATED ORAL at 08:17

## 2017-01-20 RX ADMIN — SEVELAMER CARBONATE 1600 MG: 800 TABLET, FILM COATED ORAL at 08:17

## 2017-01-20 RX ADMIN — CALCIUM ACETATE 2001 MG: 667 CAPSULE ORAL at 21:17

## 2017-01-20 RX ADMIN — ASPIRIN 81 MG 81 MG: 81 TABLET ORAL at 21:18

## 2017-01-20 RX ADMIN — GUAIFENESIN AND CODEINE PHOSPHATE 10 ML: 100; 10 SOLUTION ORAL at 17:27

## 2017-01-20 RX ADMIN — Medication 250 MG: at 08:17

## 2017-01-20 RX ADMIN — RITONAVIR 100 MG: 100 TABLET, FILM COATED ORAL at 21:19

## 2017-01-20 RX ADMIN — CARVEDILOL 25 MG: 25 TABLET, FILM COATED ORAL at 08:17

## 2017-01-20 RX ADMIN — MYCOPHENOLATE MOFETIL 500 MG: 250 CAPSULE ORAL at 08:17

## 2017-01-20 RX ADMIN — Medication 1 CAPSULE: at 08:18

## 2017-01-20 RX ADMIN — ABACAVIR 600 MG: 300 TABLET ORAL at 21:21

## 2017-01-20 RX ADMIN — CHLORHEXIDINE GLUCONATE 15 ML: 1.2 RINSE ORAL at 21:17

## 2017-01-20 RX ADMIN — SEVELAMER CARBONATE 1600 MG: 800 TABLET, FILM COATED ORAL at 16:09

## 2017-01-20 RX ADMIN — HEPARIN SODIUM 5000 UNITS: 5000 INJECTION, SOLUTION INTRAVENOUS; SUBCUTANEOUS at 21:20

## 2017-01-20 RX ADMIN — CARVEDILOL 25 MG: 25 TABLET, FILM COATED ORAL at 21:19

## 2017-01-20 RX ADMIN — SODIUM CHLORIDE 500 ML: 9 INJECTION, SOLUTION INTRAVENOUS at 12:40

## 2017-01-20 RX ADMIN — CALCIUM ACETATE 2001 MG: 667 CAPSULE ORAL at 08:17

## 2017-01-20 NOTE — PLAN OF CARE
Problem: Discharge Planning  Goal: Discharge Planning (Adult, OB, Behavioral, Peds)  CTS:  Following for TCU  Placement.

## 2017-01-20 NOTE — PROGRESS NOTES
"X-cover    Called for acute left sided facial swelling.    Denies pain/itching or erythema, evolved over about 30 minutes has never had before    /64 mmHg  Pulse 95  Temp(Src) 98.5  F (36.9  C) (Oral)  Resp 16  Ht 1.803 m (5' 11\")  Wt 81.012 kg (178 lb 9.6 oz)  BMI 24.92 kg/m2  SpO2 92%  Pleasant nad left immediately in front of ear acute ovoid swelling, hard to palpation, not warm/tender or erythematous.  Buccal mucosa examined and notable for prominent duct.     A/p    Parotid gland obstruction, likely salivary stone  No e/o infection  Recommend lemon drops and warm compresses to promote salivary flow-moderate to high risk to evolve into parotitis with infection.  No abx for now, low threshold to initiate with appropriate symptoms  "

## 2017-01-20 NOTE — PROGRESS NOTES
JOSE Wiggins has screened and determined that pt is not appropriate for TCU 2/2 cost of medications.  TCUs need to pay for a resident's stay out of the Medicare dollars that they get and pt's anti-virals are too expensive for them to cover under the stipend they get from Medicare.  This is going to be true with any TCU.  It is not exclusive to [facility].    D/W nursing. (Colleen MENDOZA)  She feels that pt is improving every day and can likely manage at home with assist.  Met with pt and brother to discuss.  They both agree pt can go home.  Pt's brother lives there and can be with pt 24/7.  Daughter is there often.  Wife is at home but has some issues with health and is not able to assist pt.    Pt is agreeable to home care support.  He goes to [facility] in [place] but requests referral be made to [facility].  Referral made for PT/OT/RN/HHA.    P:  Plan d/c for home with home care support.    SW following

## 2017-01-20 NOTE — PROGRESS NOTES
Discharge home anticipated soon, it appears.    He is off his outpt dialysis schedule which is T-Th-S.    It may be best to have him stay until Sunday.    He can have HD in AM Sunday followed by discharge home.    He can then follow up with outpt dialysis on Tuesday.      I did check with [facility] about fitting him in on Monday AM.    They did say that they could run him on Monday if he were to arrive at 0615 for a 0630 run time.      So if discharged over weekend, please call them to inform them if he is running on Monday.      Rudolph Wilkes MD

## 2017-01-20 NOTE — PROGRESS NOTES
[facility]  Infectious Disease Progress Note          Assessment and Plan:   IMPRESSION:    1.  A 66-year-old male, very complex medical history, known to me from prior admission, currently is admitted with chest pain, rule out coronary artery disease, now has developed some increased cough and respiratory symptoms, although chest x-ray is negative.  New acute fever in the hospital as well, question respiratory infection, although not initially obvious, no leukocytosis, not really productive sputum and again chest x-ray negative.    2.  Chronic human immunodeficiency virus infection for over 30 years, treated at [facility], most recent T cells were 3 weeks ago at which time T cells were 263 and undetectable virus, i.e., unlikely to have an opportunistic infection, is on a fairly complicated 5-drug regimen, but well controlled.    3.  Known coronary disease, does not appear he has that as an explanation for current symptoms.    4.  Diabetes mellitus.    5.  End-stage renal disease on chronic dialysis on the transplant list.    6.  Sulfa allergy.    7.  Nephrolithiasis.   8 Resp failure, infiltrate presumed pneumonia, intubated in ICU       RECOMMENDATIONS:    1.  Continue his current antiretroviral therapy, long term, FU with  Regular [facility] MDs   2.  Extubated and looks well off antibiotics and watching, will sign off callif issues            Interval History:   Extubated and awake out of ICU,improved overall No + cxs sputum neg  No fever O2 OK now off O2              Medications:       saccharomyces boulardii  250 mg Oral BID     albuterol  2.5 mg Nebulization 4x daily     insulin aspart  1-10 Units Subcutaneous TID AC     insulin aspart  1-7 Units Subcutaneous At Bedtime     pantoprazole  40 mg Oral QAM     gabapentin  300 mg Oral QAM     gabapentin  600 mg Oral QPM     B complex-C-folic acid  1 capsule Oral Daily     atorvastatin  10 mg Oral Daily     sevelamer  1,600 mg Oral TID  "w/meals     insulin glargine  26 Units Subcutaneous BID     aspirin chewable tablet 81 mg  81 mg Oral QPM     carvedilol  25 mg Oral BID w/meals     dapsone  100 mg Oral At Bedtime     losartan  100 mg Oral At Bedtime     magnesium oxide (MAG-OX) tablet 400 mg  400 mg Oral At Bedtime     mycophenolate  500 mg Oral BID     heparin  5,000 Units Subcutaneous BID     sodium chloride (PF)  10 mL Intracatheter Q7 Days     - MEDICATION INSTRUCTIONS for Dialysis Patients -   Does not apply See Admin Instructions     sodium chloride (PF)  3 mL Intracatheter Q8H     abacavir  600 mg Oral QPM     calcium acetate  2,001 mg Oral TID w/meals     chlorhexidine  15 mL Swish & Spit BID     clopidogrel (PLAVIX) tablet 75 mg  75 mg Oral QPM     darunavir  800 mg Oral At Bedtime     dolutegravir  50 mg Oral At Bedtime     ritonavir  100 mg Oral At Bedtime                  Physical Exam:   Blood pressure 141/71, pulse 124, temperature 97.1  F (36.2  C), temperature source Oral, resp. rate 22, height 1.803 m (5' 11\"), weight 81.012 kg (178 lb 9.6 oz), SpO2 94 %.  Wt Readings from Last 2 Encounters:   01/20/17 81.012 kg (178 lb 9.6 oz)   11/28/16 88.905 kg (196 lb)     Vital Signs with Ranges  Temp:  [96  F (35.6  C)-98.5  F (36.9  C)] 97.1  F (36.2  C)  Pulse:  [] 124  Heart Rate:  [] 68  Resp:  [16-22] 22  BP: (125-232)/() 141/71 mmHg  SpO2:  [91 %-95 %] 94 %    Constitutional: extubated awake and interactive   Lungs: Congestion to auscultation bilaterally, no crackles or wheezing   Cardiovascular: Regular rate and rhythm, normal S1 and S2, and no murmur noted   Abdomen: Normal bowel sounds, soft, non-distended, non-tender   Skin: No rashes, no cyanosis, no edema   Other:           Data:   All microbiology laboratory data reviewed.  Recent Labs   Lab Test  01/20/17   0510  01/19/17   0541  01/18/17   0630  01/17/17   0523  01/16/17   0450   WBC   --   6.2   --   10.5  10.2   HGB  8.5*  8.0*  8.2*  8.4*  5.9*   HCT   -- "   25.3*   --   26.8*  19.6*   MCV   --   91   --   90  92   PLT   --   206   --   198  164     Recent Labs   Lab Test  01/19/17   0541  01/17/17   0523  01/16/17   0450   CR  5.37*  4.35*  7.56*     No lab results found.  Recent Labs   Lab Test  01/07/17   1150  01/05/17 2012 01/05/17   2000  05/19/16   1730  03/05/16   2248  03/05/16   2247  03/05/16   2204  03/05/16   1115  07/06/14   2330   CULT  No growth  Incorrectly ordered by PCU/Clinic Canceled, Test credited ordered sputum culture   instead.    No growth  No growth  No growth  Test canceled by physician Duplicate request Charge credited  Test canceled by physician Duplicate request Charge credited  Test canceled by physician Duplicate request Charge credited  Test canceled by physician Duplicate request Charge credited  No growth  No growth  Charge credited Specimen not received Test canceled by PCU/Clinic  No growth

## 2017-01-20 NOTE — PLAN OF CARE
Problem: Goal Outcome Summary  Goal: Goal Outcome Summary  Outcome: No Change  AO, VSS-1.5 NC. LS diminished. Denies pain. Up 1 and walker. Tele SR.

## 2017-01-20 NOTE — PROGRESS NOTES
Dialysis Procedure Note:    POTASSIUM      3.9   1/19/2017    HGB      8.5   1/20/2017    Weight: 81.012 kg (178 lb 9.6 oz)    All safety checks completed, air detectors on, venous and arterial parameters  set prior to treatment.  Consent verified  Pt dialyzed for 3.5  hours via LAF   With a net volume removal of 2L onK3  bath.  Heparin given during run: 0 units  Medications given Epogen   Complications: none  Seen by Dr. Wilkes during treatment.  EDW 80 Post run weight 79.5  Pt received education on procedure and ESRD while on dialysis   Transducer checked every 15 minutes  Water alarms on  See flowsheet for details of dialysis run  Pt dialyzes Vidal poe sat in [place]  Post run assessment charted in [facility] Flowsheet  Ruby Carlos RN

## 2017-01-20 NOTE — PROGRESS NOTES
[facility]  Hospitalist Progress Note  Tam Fraser MD, MD 01/20/2017  (Text Page)  Reason for Stay (Diagnosis): respiratory failure, sepsis from bilateral pneumonia         Assessment and Plan:      Summary of Stay: Donald Raza is a 66 year old male with complex medical hx of HAART for hx of HIV, ESRD on HD, CAD, Hypertension, DM type 2 on insulin, chronic anemia  admitted on 1/3/2017 with episodes of chest pain and was determined non cardiac in etiology given heart cath findings this admit. Hospital course then complicated by intermittent fever spikes ( similar to previous hospitalizations) and increasing O2 requirements that eventually led to severe respiratory distress, unresponsiveness and a CODE blue on 1/7 resulting in intubation/ventilation.  Course complicated by failed attempt at extubation on 1/9 with re-intubation 3 hours later.  he then self extubated on 1/10 again requiring re-intubation.  Has now been doing well after extubation on 1/14.    Problem List:    1. Chest pain with known coronary artery disease.  Cardiology consult appreciated. Has known CAD.  Angio done 1/4/17.  No significant lesions noted.  Continue ASA, Coreg, Plavix, Cozaar, Lipitor.  2. Acute respiratory failure secondary to bilateral Pneumonia- s/p extubation, resolved.  Vanco stopped 1/16.  Levaquin stopped 1/17.  Zosyn stopped as well. Doing well off antibiotics. Low suspicion for opportunistic infection. Cultures had no growth. Appreciate ID input  3. End stage kidney failure.  Hemodialysis per Nephrology.  Continue Phoslo.  4. Acute on chronic anemia.  Hgb 8.0  And stable.  Hemoccult positive.  GI following.  They recommend follow up in the outpatient setting.  Monitor. No obvious source of bleeding.   5. HIV.  Continue Ziagen, Prezista, Tivicay, and Norvir.  Dapsone.  6. HTN.  Continue Cozaar, Coreg.  7. Diabetes Mellitus.  Continue Lantus, Novolog SSI.  8. Weakness.  PT and OT consults., patient is  "amenable for TCU placement  9. Diarrhea.  C diff toxin negative.  on probiotic.  10. Sialolithiasis- no evidence of abscess, or ongoing infected stage. Will continue with frequent lemon drops.    DVT Prophylaxis: Heparin SQ  Code Status: Full Code  Discharge Dispo: PT and OT consult.  continue inpatient care.   Estimated Disch Date / # of Days until Disch: likely in the next 24 hours to TCU          Interval History (Subjective):      Continuing care today.  Seen and examined.  Donald had some tenderness on his left cheek, submandibular area last night. No fevers, no nausea/vomiting.  Feeling getting stronger already.  Tolerating oral diet. Remained afebrile. No diarrhea.                  Physical Exam:      Last Vital Signs:  /78 mmHg  Pulse 124  Temp(Src) 97.1  F (36.2  C) (Oral)  Resp 22  Ht 1.803 m (5' 11\")  Wt 81.012 kg (178 lb 9.6 oz)  BMI 24.92 kg/m2  SpO2 94%    I/O last 3 completed shifts:  In: 350 [P.O.:350]  Out: -   Wt Readings from Last 1 Encounters:   01/20/17 81.012 kg (178 lb 9.6 oz)     Filed Vitals:    01/16/17 0445 01/16/17 0730 01/18/17 0600 01/19/17 0234   Weight: 80.9 kg (178 lb 5.6 oz) 80.9 kg (178 lb 5.6 oz) 79.1 kg (174 lb 6.1 oz) 81.012 kg (178 lb 9.6 oz)    01/20/17 0455   Weight: 81.012 kg (178 lb 9.6 oz)       Constitutional: Awake, alert, cooperative, no apparent distress   Respiratory: Clear to auscultation bilaterally, no crackles or wheezing   Cardiovascular: Regular rate and rhythm, normal S1 and S2, and no murmur noted   Abdomen: Normal bowel sounds, soft, non-distended, non-tender   Skin: No rashes, no cyanosis, dry to touch   Neuro: Alert and oriented x3, no weakness, spontaneous and coherent speech   Extremities: No edema, normal range of motion, Picc line well dressed in R UE   Other(s): Euthymic mood, not agitated    Enlarged and left submandibular gland, non tender upon palpation   All other systems: Negative          Medications:      All current medications were " reviewed with changes reflected in problem list.         Data:      All new lab and imaging data was reviewed.   Labs:  No results for input(s): CULT in the last 168 hours.    Recent Labs  Lab 01/20/17  0510 01/19/17  0541 01/18/17  0630 01/17/17  0523 01/16/17  0450   WBC  --  6.2  --  10.5 10.2   HGB 8.5* 8.0* 8.2* 8.4* 5.9*   HCT  --  25.3*  --  26.8* 19.6*   MCV  --  91  --  90 92   PLT  --  206  --  198 164       Recent Labs  Lab 01/20/17 0510 01/19/17 0541 01/18/17 0630 01/17/17 0523 01/16/17 0450   NA  --  137  --  140 143   POTASSIUM  --  3.9 3.7 3.5 3.7   CHLORIDE  --  96  --  99 99   CO2  --  30  --  31 29   ANIONGAP  --  11  --  10 15*   GLC  --  217*  --  77 100*   BUN  --  47*  --  43* 104*   CR  --  5.37*  --  4.35* 7.56*   GFRESTIMATED  --  11*  --  14* 7*   GFRESTBLACK  --  13*  --  16* 9*   PRABHA  --  9.3  --  9.3 10.1   MAG 2.7*  --  2.3  --  2.5*   PHOS  --   --   --   --  7.1*   PROTTOTAL  --   --   --  7.3  --    ALBUMIN  --   --   --  2.6* 2.4*   BILITOTAL  --   --   --  0.7  --    ALKPHOS  --   --   --  54  --    AST  --   --   --  29  --    ALT  --   --   --  48  --        Recent Labs  Lab 01/20/17  0807 01/20/17  0151 01/19/17  2124 01/19/17  1721 01/19/17  1154  01/19/17  0541  01/17/17  0523  01/16/17  0450  01/15/17  0530   GLC  --   --   --   --   --   --  217*  --  77  --  100*  --  109*   BGM 74 140* 208* 155* 196*  < >  --   < >  --   < >  --   < >  --    < > = values in this interval not displayed.  No results for input(s): TROPONIN, TROPI, TROPR in the last 168 hours.    Invalid input(s): TROP, TROPONINIES   Imaging:   No results found for this or any previous visit (from the past 48 hour(s)).

## 2017-01-20 NOTE — CONSULTS
D:  Discharge planning  I/A:  Met with pt, dtr, brother.  Pt has been here since 1/3 and quite deconditioned.  He is in agreement with TCU as is his family.  Pt lives in [place] and also does dialysis in [place], so they would like to stay close if possible.   They request a referral to [facility].  Referral made.  Note Dr. Fraser note that pt likely ready for d/c this weekend.  P:  Following

## 2017-01-20 NOTE — PROGRESS NOTES
Pt A&Ox4, transfers assist x1-2 with walker, VSS except for elevated BP. Max /88, last recheck 161/73. PRN Hydralazine given x2. MD aware. Denies pain. LUE fistula, WDL. Pt had 1 BM this shift, diarrhea.  and 155. ID following. GI rec outpatient f/u for colonoscopy/EGD. OT/PT following. Tele SR. DC 1-2 days to TCU.    [id number] Admitting MD paged RE FYI: new swelling to L cheek in last 30 mins. Hard to touch. Airway does not appear to be swelling. BP now 194/141. given ordered hy dralazine. Please advise. Thanks    MD came to assess, swelling to left side of face is blocked parotid gland. Need to promote salivary flow, will apply warm compresses.

## 2017-01-20 NOTE — PROGRESS NOTES
Inpatient Dialysis Progress Note            Assessment and Plan:     1.  ESKD. Stable run.  Good access function.  2 KG UF.  BP OK.  He was given carvedilol.  He is off his usual outpt schedule which is TThS.  He will need to get back on schedule at some point.      2.  Anemia.  Cause of falling hgb uncertain.  Iron stores are not low. If anything they are high.  Folate and B12 are not low.  Haptoglobin is not low so hemolysis seems unlikely.  His stool output is black.  GI losses ?  Need hemoccult stool.   Perhaps life span of transfused RBC is short ?  He is on Epogen 10K units.  HGB now up to 8.5.      3.  HTN. On carvedilol 25 mg BID and losartan 100 mg daily.      4.  FEN.  K3 bath.  Phos 7.1.  He is on Phoslo 3 TID c meals.  Ca 10.1.  Added Renvela 2 TID c meals.  Need to recheck phos.     5. Sepsis/respiratory failure:  Improved.  Specific organism not found.            Interval History:   He is feeling well.  No C/O.      Dialysis Parameters:     Wt Readings from Last 4 Encounters:   01/20/17 81.012 kg (178 lb 9.6 oz)   11/28/16 88.905 kg (196 lb)   11/28/16 86.183 kg (190 lb)   11/07/16 86.183 kg (190 lb)     I/O last 3 completed shifts:  In: 350 [P.O.:350]  Out: -   BP Readings from Last 3 Encounters:   01/20/17 143/78   11/28/16 120/70   08/19/16 140/68       Routine, ONE TIME, Starting today For 1 Occurrences  Weight Loss (kg): 2  Dialysis Temp: 36.5  C  Access Device: AVF  Access Site: : arm  Dialyzer: Revaclear  Dialysis Bath: K 3  Blood Flow Rate (mL/min): 400  Total Treatment Time (hrs): 3.5  Heparin: no         Medications and Allergies:   Reviewed in EPIC      sodium chloride 0.9%  250 mL Hemodialysis Machine Once     sodium chloride 0.9%  250-1,000 mL Intravenous Once in dialysis     epoetin brittni (EPOGEN,PROCRIT) inj ESRD  10,000 Units Intravenous Once     saccharomyces boulardii  250 mg Oral BID     albuterol  2.5 mg Nebulization 4x daily     insulin aspart  1-10 Units Subcutaneous TID AC      insulin aspart  1-7 Units Subcutaneous At Bedtime     pantoprazole  40 mg Oral QAM     gabapentin  300 mg Oral QAM     gabapentin  600 mg Oral QPM     B complex-C-folic acid  1 capsule Oral Daily     atorvastatin  10 mg Oral Daily     sevelamer  1,600 mg Oral TID w/meals     insulin glargine  26 Units Subcutaneous BID     aspirin chewable tablet 81 mg  81 mg Oral QPM     carvedilol  25 mg Oral BID w/meals     dapsone  100 mg Oral At Bedtime     losartan  100 mg Oral At Bedtime     magnesium oxide (MAG-OX) tablet 400 mg  400 mg Oral At Bedtime     mycophenolate  500 mg Oral BID     heparin  5,000 Units Subcutaneous BID     sodium chloride (PF)  10 mL Intracatheter Q7 Days     - MEDICATION INSTRUCTIONS for Dialysis Patients -   Does not apply See Admin Instructions     sodium chloride (PF)  3 mL Intracatheter Q8H     abacavir  600 mg Oral QPM     calcium acetate  2,001 mg Oral TID w/meals     chlorhexidine  15 mL Swish & Spit BID     clopidogrel (PLAVIX) tablet 75 mg  75 mg Oral QPM     darunavir  800 mg Oral At Bedtime     dolutegravir  50 mg Oral At Bedtime     ritonavir  100 mg Oral At Bedtime     sodium chloride 0.9%, - MEDICATION INSTRUCTIONS -, albuterol, clonazePAM (klonoPIN) tablet 0.5 mg, magnesium hydroxide, guaiFENesin-codeine, glucose **OR** dextrose **OR** glucagon, hypromellose-dextran, hydrALAZINE, naloxone, IV fluid REPLACEMENT ONLY, HYDROmorphone, lidocaine 4%, heparin lock flush, magnesium sulfate, midazolam, sodium chloride (PF), lidocaine, lidocaine 4%, sodium chloride (PF), HOLD MEDICATION, hydrocortisone, terbinafine, bisacodyl, ondansetron **OR** ondansetron     Allergies   Allergen Reactions     Lisinopril      Sulfa Drugs               Labs:     BMP  Recent Labs  Lab 01/19/17  0541 01/18/17  0630 01/17/17  0523 01/16/17  0450 01/15/17  0530     --  140 143 142   POTASSIUM 3.9 3.7 3.5 3.7 3.5   CHLORIDE 96  --  99 99 100   PRABHA 9.3  --  9.3 10.1 9.3   CO2 30  --  31 29 29   BUN 47*  --   43* 104* 82*   CR 5.37*  --  4.35* 7.56* 5.57*   *  --  77 100* 109*     CBC  Recent Labs  Lab 01/20/17  0510 01/19/17  0541 01/18/17  0630 01/17/17  0523 01/16/17  0450 01/15/17  1452   WBC  --  6.2  --  10.5 10.2 11.7*   HGB 8.5* 8.0* 8.2* 8.4* 5.9* 6.4*   HCT  --  25.3*  --  26.8* 19.6* 20.8*   MCV  --  91  --  90 92 92   PLT  --  206  --  198 164 187     Lab Results   Component Value Date    AST 29 01/17/2017    ALT 48 01/17/2017    ALKPHOS 54 01/17/2017    BILITOTAL 0.7 01/17/2017    BILICONJ 0.0 07/06/2014            Physical Exam:   Vitals were reviewed in [facility]    Wt Readings from Last 3 Encounters:   01/20/17 81.012 kg (178 lb 9.6 oz)   11/28/16 88.905 kg (196 lb)   11/28/16 86.183 kg (190 lb)     No intake or output data in the 24 hours ending 01/20/17 1203    GENERAL APPEARANCE: pleasant, no distress, a & o  HEENT:  Eyes/ears/nose grossly normal, neck supple  RESP: lungs clear to auscultation with good efforts, no crackles, rhonchi or wheezes  CV: regular rate and rhythm, normal S1 S2, no murmur, click or rub   ABDOMEN: soft, nontender, bowel sounds normal  EXTREMITIES/SKIN: no edema, no rashes or lesions       Pt seen on dialysis.  Stable run.  Good BFR.      Attestation:  I have reviewed today's vital signs, notes, medications, labs and imaging.     Rudolph Wilkes MD  [facility] - Nephrology  [phone]

## 2017-01-20 NOTE — PLAN OF CARE
Very pleasant and cheerful.  Left jawline remains swollen but soft with minimal tenderness.   Sucking on lemon drops, purchased by dr SalterIn dialysis much of day.  RA sats 92 at rest . Tele SR

## 2017-01-20 NOTE — PLAN OF CARE
"Problem: Goal Outcome Summary  Goal: Goal Outcome Summary  PT: Pt continues to be limited to ambulation due to fatigue. 20 feet. FWW. Per SW may not be able to get into TCU. PT continues to recommend TCU. If D/C home pt requests talking to wife and dtg. His brother cannot help him. \"He barely can take care of himself.\" Pt will need to be able to perform stairs to go home. Pt will need 4WW at home and home PT. Will place order.        "

## 2017-01-21 ENCOUNTER — APPOINTMENT (OUTPATIENT)
Dept: PHYSICAL THERAPY | Facility: CLINIC | Age: 67
DRG: 286 | End: 2017-01-21
Payer: MEDICARE

## 2017-01-21 LAB
ALBUMIN SERPL-MCNC: 2.6 G/DL (ref 3.4–5)
ANION GAP SERPL CALCULATED.3IONS-SCNC: 10 MMOL/L (ref 3–14)
BUN SERPL-MCNC: 41 MG/DL (ref 7–30)
CALCIUM SERPL-MCNC: 9.3 MG/DL (ref 8.5–10.1)
CHLORIDE SERPL-SCNC: 97 MMOL/L (ref 94–109)
CO2 SERPL-SCNC: 31 MMOL/L (ref 20–32)
CREAT SERPL-MCNC: 5.37 MG/DL (ref 0.66–1.25)
GFR SERPL CREATININE-BSD FRML MDRD: 11 ML/MIN/1.7M2
GLUCOSE BLDC GLUCOMTR-MCNC: 176 MG/DL (ref 70–99)
GLUCOSE BLDC GLUCOMTR-MCNC: 189 MG/DL (ref 70–99)
GLUCOSE BLDC GLUCOMTR-MCNC: 193 MG/DL (ref 70–99)
GLUCOSE BLDC GLUCOMTR-MCNC: 226 MG/DL (ref 70–99)
GLUCOSE BLDC GLUCOMTR-MCNC: 94 MG/DL (ref 70–99)
GLUCOSE SERPL-MCNC: 127 MG/DL (ref 70–99)
PHOSPHATE SERPL-MCNC: 4.6 MG/DL (ref 2.5–4.5)
POTASSIUM SERPL-SCNC: 4.1 MMOL/L (ref 3.4–5.3)
SODIUM SERPL-SCNC: 138 MMOL/L (ref 133–144)

## 2017-01-21 PROCEDURE — 25000132 ZZH RX MED GY IP 250 OP 250 PS 637: Mod: GY | Performed by: INTERNAL MEDICINE

## 2017-01-21 PROCEDURE — 40000275 ZZH STATISTIC RCP TIME EA 10 MIN

## 2017-01-21 PROCEDURE — 25000308 HC RX OP HPI UCR WEL MED 250 IP 250: Performed by: INTERNAL MEDICINE

## 2017-01-21 PROCEDURE — A9270 NON-COVERED ITEM OR SERVICE: HCPCS | Mod: GY | Performed by: INTERNAL MEDICINE

## 2017-01-21 PROCEDURE — 25000125 ZZHC RX 250: Performed by: INTERNAL MEDICINE

## 2017-01-21 PROCEDURE — 00000146 ZZHCL STATISTIC GLUCOSE BY METER IP

## 2017-01-21 PROCEDURE — 12000007 ZZH R&B INTERMEDIATE

## 2017-01-21 PROCEDURE — 25000131 ZZH RX MED GY IP 250 OP 636 PS 637: Mod: GY | Performed by: INTERNAL MEDICINE

## 2017-01-21 PROCEDURE — 80069 RENAL FUNCTION PANEL: CPT | Performed by: INTERNAL MEDICINE

## 2017-01-21 PROCEDURE — 97116 GAIT TRAINING THERAPY: CPT | Mod: GP | Performed by: PHYSICAL THERAPIST

## 2017-01-21 PROCEDURE — 94640 AIRWAY INHALATION TREATMENT: CPT | Mod: 76

## 2017-01-21 PROCEDURE — 25000132 ZZH RX MED GY IP 250 OP 250 PS 637: Mod: GY

## 2017-01-21 PROCEDURE — 94640 AIRWAY INHALATION TREATMENT: CPT

## 2017-01-21 PROCEDURE — 40000193 ZZH STATISTIC PT WARD VISIT: Performed by: PHYSICAL THERAPIST

## 2017-01-21 PROCEDURE — 99232 SBSQ HOSP IP/OBS MODERATE 35: CPT | Performed by: INTERNAL MEDICINE

## 2017-01-21 PROCEDURE — 40000274 ZZH STATISTIC RCP CONSULT EA 30 MIN

## 2017-01-21 RX ADMIN — ABACAVIR 600 MG: 300 TABLET ORAL at 20:28

## 2017-01-21 RX ADMIN — CHLORHEXIDINE GLUCONATE 15 ML: 1.2 RINSE ORAL at 08:32

## 2017-01-21 RX ADMIN — GABAPENTIN 600 MG: 300 CAPSULE ORAL at 21:37

## 2017-01-21 RX ADMIN — Medication 250 MG: at 20:23

## 2017-01-21 RX ADMIN — Medication 250 MG: at 08:31

## 2017-01-21 RX ADMIN — GABAPENTIN 300 MG: 300 CAPSULE ORAL at 08:31

## 2017-01-21 RX ADMIN — Medication 1 CAPSULE: at 08:32

## 2017-01-21 RX ADMIN — SEVELAMER CARBONATE 1600 MG: 800 TABLET, FILM COATED ORAL at 12:31

## 2017-01-21 RX ADMIN — CARVEDILOL 25 MG: 25 TABLET, FILM COATED ORAL at 08:31

## 2017-01-21 RX ADMIN — CALCIUM ACETATE 2001 MG: 667 CAPSULE ORAL at 08:31

## 2017-01-21 RX ADMIN — INSULIN GLARGINE 26 UNITS: 100 INJECTION, SOLUTION SUBCUTANEOUS at 21:37

## 2017-01-21 RX ADMIN — CHLORHEXIDINE GLUCONATE 15 ML: 1.2 RINSE ORAL at 20:23

## 2017-01-21 RX ADMIN — ASPIRIN 81 MG 81 MG: 81 TABLET ORAL at 20:23

## 2017-01-21 RX ADMIN — HEPARIN SODIUM 5000 UNITS: 5000 INJECTION, SOLUTION INTRAVENOUS; SUBCUTANEOUS at 08:30

## 2017-01-21 RX ADMIN — SEVELAMER CARBONATE 1600 MG: 800 TABLET, FILM COATED ORAL at 17:44

## 2017-01-21 RX ADMIN — HEPARIN SODIUM 5000 UNITS: 5000 INJECTION, SOLUTION INTRAVENOUS; SUBCUTANEOUS at 20:22

## 2017-01-21 RX ADMIN — DARUNAVIR 800 MG: 800 TABLET, FILM COATED ORAL at 20:23

## 2017-01-21 RX ADMIN — CLOPIDOGREL 75 MG: 75 TABLET, FILM COATED ORAL at 20:23

## 2017-01-21 RX ADMIN — LOSARTAN POTASSIUM 100 MG: 100 TABLET, FILM COATED ORAL at 21:37

## 2017-01-21 RX ADMIN — ALBUTEROL SULFATE 2.5 MG: 2.5 SOLUTION RESPIRATORY (INHALATION) at 11:43

## 2017-01-21 RX ADMIN — CALCIUM ACETATE 2001 MG: 667 CAPSULE ORAL at 20:25

## 2017-01-21 RX ADMIN — MYCOPHENOLATE MOFETIL 500 MG: 250 CAPSULE ORAL at 20:23

## 2017-01-21 RX ADMIN — RITONAVIR 100 MG: 100 TABLET, FILM COATED ORAL at 21:37

## 2017-01-21 RX ADMIN — SEVELAMER CARBONATE 1600 MG: 800 TABLET, FILM COATED ORAL at 08:31

## 2017-01-21 RX ADMIN — CARVEDILOL 25 MG: 25 TABLET, FILM COATED ORAL at 20:25

## 2017-01-21 RX ADMIN — MAGNESIUM OXIDE TAB 400 MG (241.3 MG ELEMENTAL MG) 400 MG: 400 (241.3 MG) TAB at 21:37

## 2017-01-21 RX ADMIN — DAPSONE 100 MG: 25 TABLET ORAL at 21:37

## 2017-01-21 RX ADMIN — MYCOPHENOLATE MOFETIL 500 MG: 250 CAPSULE ORAL at 08:31

## 2017-01-21 RX ADMIN — CALCIUM ACETATE 2001 MG: 667 CAPSULE ORAL at 12:31

## 2017-01-21 RX ADMIN — INSULIN GLARGINE 26 UNITS: 100 INJECTION, SOLUTION SUBCUTANEOUS at 08:30

## 2017-01-21 RX ADMIN — ALBUTEROL SULFATE 2.5 MG: 2.5 SOLUTION RESPIRATORY (INHALATION) at 07:47

## 2017-01-21 RX ADMIN — ALBUTEROL SULFATE 2.5 MG: 2.5 SOLUTION RESPIRATORY (INHALATION) at 17:09

## 2017-01-21 RX ADMIN — ATORVASTATIN CALCIUM 10 MG: 10 TABLET, FILM COATED ORAL at 21:37

## 2017-01-21 RX ADMIN — PANTOPRAZOLE SODIUM 40 MG: 40 TABLET, DELAYED RELEASE ORAL at 08:32

## 2017-01-21 RX ADMIN — ALBUTEROL SULFATE 2.5 MG: 2.5 SOLUTION RESPIRATORY (INHALATION) at 19:49

## 2017-01-21 NOTE — PROGRESS NOTES
[facility]  Hospitalist Progress Note  Tam Fraser MD, MD 01/21/2017  (Text Page)  Reason for Stay (Diagnosis): respiratory failure, sepsis from bilateral pneumonia         Assessment and Plan:      Summary of Stay: Donald Raza is a 66 year old male with complex medical hx of HAART for hx of HIV, ESRD on HD, CAD, Hypertension, DM type 2 on insulin, chronic anemia  admitted on 1/3/2017 with episodes of chest pain and was determined non cardiac in etiology given heart cath findings this admit. Hospital course then complicated by intermittent fever spikes ( similar to previous hospitalizations) and increasing O2 requirements that eventually led to severe respiratory distress, unresponsiveness and a CODE blue on 1/7 resulting in intubation/ventilation.  Course complicated by failed attempt at extubation on 1/9 with re-intubation 3 hours later.  he then self extubated on 1/10 again requiring re-intubation.  Has now been doing well after extubation on 1/14.    Problem List:    1. Chest pain with known coronary artery disease.  Cardiology consult appreciated. Has known CAD.  Angio done 1/4/17.  No significant lesions noted.  Continue ASA, Coreg, Plavix, Cozaar, Lipitor.  2. Acute respiratory failure secondary to bilateral Pneumonia- s/p extubation, resolved.  Vanco stopped 1/16.  Levaquin stopped 1/17.  Zosyn stopped as well. Doing well off antibiotics. Low suspicion for opportunistic infection. Cultures had no growth. Appreciate ID input and signed off already  3. End stage kidney failure.  Hemodialysis per Nephrology.  Continue Phoslo. Plans for hemodialysis on Sunday AM prior to discharge  4. Acute on chronic anemia.  Hgb 8.5  And stable.  Hemoccult positive.  GI following.  They recommend follow up in the outpatient setting.  Monitor. No obvious source of bleeding. Defer further blood draws for now.  5. HIV with no AIDS.  Continue Ziagen, Prezista, Tivicay, and Norvir.  Dapsone.  6. HTN.   "Continue Cozaar, Coreg.  7. Diabetes Mellitus.  Continue Lantus, Novolog SSI. Adjust as needed  8. Weakness.  PT and OT consults., this has been improving and Donald is getting more comfortable in home discharge  9. Diarrhea.  C diff toxin negative.  on probiotic.  10. Sialolithiasis- improving, no evidence of abscess, or ongoing infected stage. Will continue with frequent lemon drops.    DVT Prophylaxis: Heparin SQ  Code Status: Full Code  Discharge Dispo: PT and OT consult.  continue inpatient care.   Estimated Disch Date / # of Days until Disch: tomorrow after HD           Interval History (Subjective):      Continuing care today.  Seen and examined.  Donald has no ongoing complaints. Remained afebrile.  No nausea/vomiting                Physical Exam:      Last Vital Signs:  /69 mmHg  Pulse 87  Temp(Src) 98.2  F (36.8  C) (Oral)  Resp 20  Ht 1.803 m (5' 11\")  Wt 79.924 kg (176 lb 3.2 oz)  BMI 24.59 kg/m2  SpO2 96%    I/O last 3 completed shifts:  In: -   Out: 2000 [Other:2000]  Wt Readings from Last 1 Encounters:   01/21/17 79.924 kg (176 lb 3.2 oz)     Filed Vitals:    01/16/17 0730 01/18/17 0600 01/19/17 0234 01/20/17 0455   Weight: 80.9 kg (178 lb 5.6 oz) 79.1 kg (174 lb 6.1 oz) 81.012 kg (178 lb 9.6 oz) 81.012 kg (178 lb 9.6 oz)    01/21/17 0341   Weight: 79.924 kg (176 lb 3.2 oz)       Constitutional: Awake, alert, cooperative, no apparent distress   Respiratory: Clear to auscultation bilaterally, no crackles or wheezing   Cardiovascular: Regular rate and rhythm, normal S1 and S2, and no murmur noted   Abdomen: Normal bowel sounds, soft, non-distended, non-tender   Skin: No rashes, no cyanosis, dry to touch   Neuro: Alert and oriented x3, no weakness, spontaneous and coherent speech   Extremities: No edema, normal range of motion, Picc line well dressed in R UE   Other(s): Euthymic mood, not agitated    Enlarged and left submandibular gland, non tender upon palpation   All other systems: Negative "          Medications:      All current medications were reviewed with changes reflected in problem list.         Data:      All new lab and imaging data was reviewed.   Labs:  No results for input(s): CULT in the last 168 hours.    Recent Labs  Lab 01/20/17  0510 01/19/17  0541 01/18/17 0630 01/17/17 0523 01/16/17  0450   WBC  --  6.2  --  10.5 10.2   HGB 8.5* 8.0* 8.2* 8.4* 5.9*   HCT  --  25.3*  --  26.8* 19.6*   MCV  --  91  --  90 92   PLT  --  206  --  198 164       Recent Labs  Lab 01/21/17  0558 01/20/17  0510 01/19/17  0541 01/18/17 0630 01/17/17 0523 01/16/17  0450     --  137  --  140 143   POTASSIUM 4.1  --  3.9 3.7 3.5 3.7   CHLORIDE 97  --  96  --  99 99   CO2 31  --  30  --  31 29   ANIONGAP 10  --  11  --  10 15*   *  --  217*  --  77 100*   BUN 41*  --  47*  --  43* 104*   CR 5.37*  --  5.37*  --  4.35* 7.56*   GFRESTIMATED 11*  --  11*  --  14* 7*   GFRESTBLACK 13*  --  13*  --  16* 9*   PRABHA 9.3  --  9.3  --  9.3 10.1   MAG  --  2.7*  --  2.3  --  2.5*   PHOS 4.6*  --   --   --   --  7.1*   PROTTOTAL  --   --   --   --  7.3  --    ALBUMIN 2.6*  --   --   --  2.6* 2.4*   BILITOTAL  --   --   --   --  0.7  --    ALKPHOS  --   --   --   --  54  --    AST  --   --   --   --  29  --    ALT  --   --   --   --  48  --        Recent Labs  Lab 01/21/17  1159 01/21/17  0737 01/21/17  0558 01/21/17  0211 01/20/17  2132 01/20/17  1600  01/19/17  0541  01/17/17  0523  01/16/17  0450  01/15/17  0530   GLC  --   --  127*  --   --   --   --  217*  --  77  --  100*  --  109*   * 94  --  193* 275* 137*  < >  --   < >  --   < >  --   < >  --    < > = values in this interval not displayed.  No results for input(s): TROPONIN, TROPI, TROPR in the last 168 hours.    Invalid input(s): TROP, TROPONINIES   Imaging:   No results found for this or any previous visit (from the past 48 hour(s)).

## 2017-01-21 NOTE — PROGRESS NOTES
CLINICAL NUTRITION SERVICES - REASSESSMENT NOTE      Recommendations Ordered by Registered Dietitian (RD):   Continue oral nutrition supplements     EVALUATION OF PROGRESS TOWARD GOALS/NEW FINDINGS   -Food intake and supplements - Monitor adequacy of intake (% of meals, supplements)  -Weight - Trends     Diet order: continues on a dialysis diet with Nepro TID with meals.  Per flow sheet review, % intake for documented meals. Patient reports good appetite and intake of Nepro with meals. Nutrition associate continues to see daily for meal ordering guidance.      Meds: Nephrocaps, Phoslo, Florastor    Labs: Cr 5.37 (H), P04 4.6 (H), BG variable >200    BM: 1/21    Weight: 79.9 kg, down ~6 kg since admit  Filed Vitals:    01/16/17 0730 01/18/17 0600 01/19/17 0234 01/20/17 0455   Weight: 80.9 kg (178 lb 5.6 oz) 79.1 kg (174 lb 6.1 oz) 81.012 kg (178 lb 9.6 oz) 81.012 kg (178 lb 9.6 oz)    01/21/17 0341   Weight: 79.924 kg (176 lb 3.2 oz)       ASSESSED NUTRITION NEEDS (DW: 85 kg dry weight):  Estimated Energy Needs: 4926-5963 kcals (25-30 Kcal/Kg)  Justification: maintenance  Estimated Protein Needs: 102-170 grams protein (1.2-2 g pro/Kg)  Justification: hypercatabolism with critical illness and dialysis  Estimated Fluid Needs: 6804-3776  mL (1 mL/Kcal)  Justification: maintenance and per provider pending fluid status    Previous Goals:   Pt to consume >/=75% of meals TID + 2 supplements  Evaluation: Met    Previous Nutrition Diagnosis:   Predicted suboptimal nutrient intake (protein-energy) related to increased nutrient needs secondary to HD   Evaluation: No change    CURRENT NUTRITION DIAGNOSIS  Predicted suboptimal nutrient intake (protein-energy) related to increased nutrient needs secondary to HD     INTERVENTIONS  Recommendations / Nutrition Prescription  Continue diet and supplements, MVI as ordered    Implementation  Medical food supplement: continue Nepro with meals    Goals  Patient to consume >/=75% of  meals TID and >/=2 high protein supplements per day    MONITORING AND EVALUATION:  Food, fluid and supplement intake - Monitor for adequacy   Progress toward goals will be evaluated per protocol.    WILMER Cabral  3rd floor/ICU: [phone]  All other floors: [phone]  Weekend/holiday: [phone]  Office: [phone]

## 2017-01-21 NOTE — PROGRESS NOTES
"[facility] RCAT     Date: 1/21/17  Admission Dx: Chest Pain  Pulmonary History: Pulmonary Hypertension  Home Nebulizer/MDI Use: None  Home Oxygen: None  Acuity Level (RCAT flow sheet): 3   Aerosol Therapy initiated: Albuterol QID and Q2 prn      Pulmonary Hygiene initiated: Cough and deep breathing      Volume Expansion initiated: IS and aerobika      Current Oxygen Requirements: RA  Current SpO2: 93%    Re-evaluation date: 1/24/17    Patient Education: Talked with patient about RCAT protocol and medication benefits as well as side effects. Patient verbalizes understanding in regards to medication.    See \"RT Assessments\" flow sheet for patient assessment scoring and Acuity Level Details.             "

## 2017-01-21 NOTE — PLAN OF CARE
Problem: Goal Outcome Summary  Goal: Goal Outcome Summary  Calls appropriately. Had HD yesterday. Fistula to L arm. Double lumen PICC to R arm. Transfers A1 w/walker- felt dizzy when ambulating overnight. Dialysis plan laid out in nephrology note pending when pt discharged. Plan to DC home with home care support.

## 2017-01-21 NOTE — PLAN OF CARE
General spirits seem good.. Continue to encourage IS for lung expansion  Left side of jaw swollen but soft and not painful   Up to chair  and PT working on strengthening.  Plan for dischg tomorrow with home care  Tele SR

## 2017-01-21 NOTE — PROGRESS NOTES
SWS  Met with family:  Dtr, brother, wife, pt.  They are on board with plan for pt coming home tomorrow after dialysis.  A referral has  Been made for home care:  RN/PT/OT/HHA through [facility].  P:  Plan for d/c tomorrow.

## 2017-01-21 NOTE — PROGRESS NOTES
Pt is doing fine.  He is walking the halls and is very jovial.    AF-VSS  Labs reviewed - stable  Walking, happy, alert, interactive    1.  ESKD.  Pt will dialyse tmrw and then will be back on his TRS schedule thereafter.

## 2017-01-21 NOTE — PLAN OF CARE
Problem: Goal Outcome Summary  Goal: Goal Outcome Summary    PT- Pt with much improved tolerance to activity today.  Walked 200 feet with WW and CGA. Pt wanted to walk with no walker, altered gait and more unsteady.  Recommend use of walker at home, recommend home PT safety eval.

## 2017-01-21 NOTE — PLAN OF CARE
Problem: Goal Outcome Summary  Goal: Goal Outcome Summary  Outcome: No Change  Patient alert, calls for A1 with walker. Returned from dialysis, feeling weak. Ambulated short distance in ar[place]iega with PT,SALVADOR. RA 92% Per nephrology note, pt should stay until Sunday and discharge after HD. Pt should also run Monday at [place], They need to informed of run at 6:30 Monday. SW working on discharge-no TCU will accept d/t meds.

## 2017-01-22 VITALS
OXYGEN SATURATION: 94 % | SYSTOLIC BLOOD PRESSURE: 135 MMHG | TEMPERATURE: 98.1 F | RESPIRATION RATE: 18 BRPM | DIASTOLIC BLOOD PRESSURE: 62 MMHG | BODY MASS INDEX: 25.49 KG/M2 | HEART RATE: 73 BPM | HEIGHT: 71 IN | WEIGHT: 182.1 LBS

## 2017-01-22 LAB
ERYTHROCYTE [DISTWIDTH] IN BLOOD BY AUTOMATED COUNT: 16.3 % (ref 10–15)
GLUCOSE BLDC GLUCOMTR-MCNC: 115 MG/DL (ref 70–99)
GLUCOSE BLDC GLUCOMTR-MCNC: 128 MG/DL (ref 70–99)
GLUCOSE BLDC GLUCOMTR-MCNC: 192 MG/DL (ref 70–99)
HCT VFR BLD AUTO: 26 % (ref 40–53)
HGB BLD-MCNC: 8 G/DL (ref 13.3–17.7)
MCH RBC QN AUTO: 28.7 PG (ref 26.5–33)
MCHC RBC AUTO-ENTMCNC: 30.8 G/DL (ref 31.5–36.5)
MCV RBC AUTO: 93 FL (ref 78–100)
PLATELET # BLD AUTO: 222 10E9/L (ref 150–450)
POTASSIUM SERPL-SCNC: 4.7 MMOL/L (ref 3.4–5.3)
RBC # BLD AUTO: 2.79 10E12/L (ref 4.4–5.9)
WBC # BLD AUTO: 5.3 10E9/L (ref 4–11)

## 2017-01-22 PROCEDURE — 25000308 HC RX OP HPI UCR WEL MED 250 IP 250: Performed by: INTERNAL MEDICINE

## 2017-01-22 PROCEDURE — A9270 NON-COVERED ITEM OR SERVICE: HCPCS | Mod: GY | Performed by: INTERNAL MEDICINE

## 2017-01-22 PROCEDURE — 25000132 ZZH RX MED GY IP 250 OP 250 PS 637: Mod: GY | Performed by: INTERNAL MEDICINE

## 2017-01-22 PROCEDURE — 94640 AIRWAY INHALATION TREATMENT: CPT

## 2017-01-22 PROCEDURE — 25000128 H RX IP 250 OP 636: Performed by: INTERNAL MEDICINE

## 2017-01-22 PROCEDURE — 25000131 ZZH RX MED GY IP 250 OP 636 PS 637: Mod: GY | Performed by: INTERNAL MEDICINE

## 2017-01-22 PROCEDURE — 25000125 ZZHC RX 250: Performed by: INTERNAL MEDICINE

## 2017-01-22 PROCEDURE — 99239 HOSP IP/OBS DSCHRG MGMT >30: CPT | Performed by: INTERNAL MEDICINE

## 2017-01-22 PROCEDURE — 25000132 ZZH RX MED GY IP 250 OP 250 PS 637: Mod: GY

## 2017-01-22 PROCEDURE — 84132 ASSAY OF SERUM POTASSIUM: CPT | Performed by: INTERNAL MEDICINE

## 2017-01-22 PROCEDURE — 90937 HEMODIALYSIS REPEATED EVAL: CPT

## 2017-01-22 PROCEDURE — 85027 COMPLETE CBC AUTOMATED: CPT | Performed by: INTERNAL MEDICINE

## 2017-01-22 PROCEDURE — 40000275 ZZH STATISTIC RCP TIME EA 10 MIN

## 2017-01-22 PROCEDURE — 94640 AIRWAY INHALATION TREATMENT: CPT | Mod: 76

## 2017-01-22 PROCEDURE — 00000146 ZZHCL STATISTIC GLUCOSE BY METER IP

## 2017-01-22 PROCEDURE — 63400005 ZZH RX 634: Performed by: INTERNAL MEDICINE

## 2017-01-22 RX ORDER — ALBUMIN (HUMAN) 12.5 G/50ML
50 SOLUTION INTRAVENOUS
Status: DISCONTINUED | OUTPATIENT
Start: 2017-01-22 | End: 2017-01-22

## 2017-01-22 RX ORDER — ALBUMIN, HUMAN INJ 5% 5 %
250 SOLUTION INTRAVENOUS
Status: DISCONTINUED | OUTPATIENT
Start: 2017-01-22 | End: 2017-01-22

## 2017-01-22 RX ADMIN — Medication 250 MG: at 12:25

## 2017-01-22 RX ADMIN — ALBUTEROL SULFATE 2.5 MG: 2.5 SOLUTION RESPIRATORY (INHALATION) at 08:16

## 2017-01-22 RX ADMIN — CARVEDILOL 25 MG: 25 TABLET, FILM COATED ORAL at 12:26

## 2017-01-22 RX ADMIN — SODIUM CHLORIDE 250 ML: 9 INJECTION, SOLUTION INTRAVENOUS at 07:59

## 2017-01-22 RX ADMIN — ALBUTEROL SULFATE 2.5 MG: 2.5 SOLUTION RESPIRATORY (INHALATION) at 13:42

## 2017-01-22 RX ADMIN — CHLORHEXIDINE GLUCONATE 15 ML: 1.2 RINSE ORAL at 12:29

## 2017-01-22 RX ADMIN — Medication 1 CAPSULE: at 12:27

## 2017-01-22 RX ADMIN — SEVELAMER CARBONATE 1600 MG: 800 TABLET, FILM COATED ORAL at 12:28

## 2017-01-22 RX ADMIN — SODIUM CHLORIDE 1000 ML: 9 INJECTION, SOLUTION INTRAVENOUS at 07:58

## 2017-01-22 RX ADMIN — ERYTHROPOIETIN 10000 UNITS: 10000 INJECTION, SOLUTION INTRAVENOUS; SUBCUTANEOUS at 07:58

## 2017-01-22 RX ADMIN — INSULIN GLARGINE 26 UNITS: 100 INJECTION, SOLUTION SUBCUTANEOUS at 08:49

## 2017-01-22 RX ADMIN — GABAPENTIN 300 MG: 300 CAPSULE ORAL at 12:26

## 2017-01-22 RX ADMIN — CALCIUM ACETATE 2001 MG: 667 CAPSULE ORAL at 12:25

## 2017-01-22 RX ADMIN — CALCIUM ACETATE 2001 MG: 667 CAPSULE ORAL at 07:17

## 2017-01-22 RX ADMIN — HEPARIN SODIUM 5000 UNITS: 5000 INJECTION, SOLUTION INTRAVENOUS; SUBCUTANEOUS at 12:30

## 2017-01-22 RX ADMIN — MYCOPHENOLATE MOFETIL 500 MG: 250 CAPSULE ORAL at 12:27

## 2017-01-22 RX ADMIN — PANTOPRAZOLE SODIUM 40 MG: 40 TABLET, DELAYED RELEASE ORAL at 12:28

## 2017-01-22 NOTE — PLAN OF CARE
Problem: Goal Outcome Summary  Goal: Goal Outcome Summary  Physical Therapy Discharge Summary    Reason for therapy discharge:    Discharged to home.    Progress towards therapy goal(s). See goals on Care Plan in [facility] health record for goal details.  Goals not met.  Barriers to achieving goals:   discharge from facility.    Therapy recommendation(s):    recommend use of walker at home, pt has one, ambulated 200 ft CGA with FWW   Patient not seen by documenting therapist, information obtained from previous therapy notes.

## 2017-01-22 NOTE — PLAN OF CARE
Problem: Goal Outcome Summary  Goal: Goal Outcome Summary  PT: Pt in dialysis but finished, met with pt who affirms he has a walker at home to use, declines ambulation at this time as wants to use BR once gets back to his room.

## 2017-01-22 NOTE — PLAN OF CARE
Problem: Goal Outcome Summary  Goal: Goal Outcome Summary  Outcome: No Change  Patient alert, A1 with walker. Denies pain/ SOB.States left jaw area uncomfortable from swelling but denies need for intervention.  LS dim 95% on RA. Plan is for hemodialysis tomorrow and then discharge to home with home health services.

## 2017-01-22 NOTE — PROGRESS NOTES
Inpatient Dialysis Progress Note        Assessment and Plan:     1. ESRD  2. HIV on antiretroviral therapy  3. Bilateral HCAP  4. HTN  5. Anemia    Plan.   1. 3.5hrs, UF 3 liters, 3K, Epogen 10K.   2. Outpatient HD tx schedule is TTS. Next HD tx on Tuesday at his outpatient unit if he is discharged today.         Interval History:     Afebrile. He denies SOB. Pt looks very comfortable on HD treatment. No CP. No N/V.          Dialysis Parameters:     Filed Vitals:    01/20/17 0455 01/21/17 0341 01/22/17 0624   Weight: 81.012 kg (178 lb 9.6 oz) 79.924 kg (176 lb 3.2 oz) 82.6 kg (182 lb 1.6 oz)     I/O last 3 completed shifts:  In: 966 [P.O.:960; I.V.:6]  Out: -   Temp:  [97.9  F (36.6  C)-98.1  F (36.7  C)] 98.1  F (36.7  C)  Pulse:  [67-72] 70  Heart Rate:  [67-86] 67  Resp:  [18] 18  BP: (116-169)/(53-83) 128/59 mmHg  SpO2:  [92 %-96 %] 93 %    Current Weight: 82.6  Dry Weight: previous 85 kg  Dialysis Temp: 36.5  C  Access Device: AVF  Access Site: L arm  Dialyzer: revaclear  Dialysis Bath: 3K  Sodium Profile: none  UF Goal: 3 liters  Blood Flow Rate (mL/min): 400  Total Treatment Time (hrs): 3.5  Heparin: none    Review of Systems:        Medications:     EPO dose: 10K  Zemplar: none  IV Fe: none       Physical Exam:     Vitals were reviewed  Patient Vitals for the past 24 hrs:   BP Temp Temp src Pulse Heart Rate Resp SpO2 Weight   01/22/17 1030 128/59 mmHg - - 70 - - - -   01/22/17 1015 141/71 mmHg - - 68 - - - -   01/22/17 1000 136/66 mmHg - - 70 - - - -   01/22/17 0945 116/67 mmHg - - 72 - - - -   01/22/17 0930 120/60 mmHg - - 71 - - - -   01/22/17 0915 120/65 mmHg - - 70 - - - -   01/22/17 0900 135/53 mmHg - - 69 - - - -   01/22/17 0845 141/72 mmHg - - 68 - - - -   01/22/17 0830 146/77 mmHg - - 72 - - - -   01/22/17 0815 169/75 mmHg - - 70 - - - -   01/22/17 0800 148/54 mmHg - - 72 - - - -   01/22/17 0745 165/83 mmHg - - 67 - - - -   01/22/17 0742 169/81 mmHg - - 69 - - - -   01/22/17 0716 164/72 mmHg 98.1  F  (36.7  C) Oral - 67 18 93 % -   17 0624 - - - - - - - 82.6 kg (182 lb 1.6 oz)   17 0218 147/69 mmHg - - - 83 - - -   17 2357 166/59 mmHg 97.9  F (36.6  C) Oral - 86 18 93 % -   17 2025 166/70 mmHg - - - 86 - - -   17 1949 - - - - - - 92 % -   17 1709 - - - - - - 95 % -   17 1629 160/65 mmHg 98.1  F (36.7  C) Oral - 80 18 95 % -   17 1143 - - - - - - 96 % -       Temperatures:  Current - Temp: 98.1  F (36.7  C); Max - Temp  Av  F (36.7  C)  Min: 97.9  F (36.6  C)  Max: 98.1  F (36.7  C)  Respiration range: Resp  Av  Min: 18  Max: 18  Pulse range: Pulse  Av.8  Min: 67  Max: 72  Blood pressure range: Systolic (24hrs), Av mmHg, Min:116 mmHg, Max:169 mmHg  ; Diastolic (24hrs), Av mmHg, Min:53 mmHg, Max:83 mmHg    Pulse oximetry range: SpO2  Av %  Min: 92 %  Max: 96 %  I/O last 3 completed shifts:  In: 966 [P.O.:960; I.V.:6]  Out: -     Intake/Output Summary (Last 24 hours) at 17 1038  Last data filed at 17 2355   Gross per 24 hour   Intake    723 ml   Output      0 ml   Net    723 ml       Gen: NAD  CV: RRR  Pulm: Good airflow. No wheezes  Abd: soft, NT  Neuro: a/o x3  Ext: no edema      Data:     Recent Labs   Lab Test  17   0645  17   0558  17   0541   NA   --   138  137   POTASSIUM  4.7  4.1  3.9   CHLORIDE   --   97  96   CO2   --   31  30   ANIONGAP   --   10  11   GLC   --   127*  217*   BUN   --   41*  47*   CR   --   5.37*  5.37*   PRABHA   --   9.3  9.3       HEMOGLOBIN   Date Value Ref Range Status   2017 8.0* 13.3 - 17.7 g/dL Final              Doyle Marcial MD

## 2017-01-22 NOTE — PLAN OF CARE
Problem: Goal Outcome Summary  Goal: Goal Outcome Summary  Occupational Therapy Discharge Summary    Reason for therapy discharge:    Discharged to home with home therapy.    Progress towards therapy goal(s). See goals on Care Plan [facility] health record for goal details.  Goals not met.  Barriers to achieving goals:   discharge from facility.    Therapy recommendation(s):    Continued therapy is recommended.  Rationale/Recommendations:  Pt would benefit from continued OT to maximize safety and independence in ADLs and functional transfers with improved strength and endurance.

## 2017-01-22 NOTE — DISCHARGE INSTRUCTIONS
Pneumonia (Adult)  Pneumonia is an infection deep within the lungs. It is in the small air sacs (alveoli). Pneumonia may be caused by a virus or bacteria. Pneumonia caused by bacteria is usually treated with an antibiotic. Severe cases may need to be treated in the hospital. Milder cases can be treated at home. Symptoms usually start to get better during the first 2 days of treatment.    Home care  Follow these guidelines when caring for yourself at home:    Rest at home for the first 2 to 3 days, or until you feel stronger. Don t let yourself get overly tired when you go back to your activities.    Stay away from cigarette smoke - yours or other people s.    You may use acetaminophen or ibuprofen to control fever or pain, unless another medicine was prescribed. If you have chronic liver or kidney disease, talk with your health care provider before using these medicines. Also talk with your provider if you ve had a stomach ulcer or GI bleeding. Don t give aspirin to anyone younger than 18 years of age who is ill with a fever. It may cause severe liver damage.    Your appetite may be poor, so a light diet is fine.    Drink 6 to 8 glasses of fluids every day to make sure you are getting enough fluids. Beverages can include water, sport drinks, sodas without caffeine, juices, tea, or soup. Fluids will help loosen secretions in the lung. This will make it easier for you to cough up the phlegm (sputum). If you also have heart or kidney disease, check with your health care provider before you drink extra fluids.    Take antibiotic medicine prescribed until it is all gone, even if you are feeling better after a few days.  Follow-up care  Follow up with your health care provider in the next 2 to 3 days, or as advised. This is to be sure the medicine is helping you get better.  If you are 65 or older, you should get a pneumococcal vaccine and a yearly flu (influenza) shot. You should also get these vaccines if you have  chronic lung disease like asthma, emphysema, or COPD. Ask your provider about this.  When to seek medical advice  Call your health care provider right away if any of these occur:    You don t get better within the first 48 hours of treatment    Shortness of breath gets worse    Rapid breathing (more than 25 breaths per minute)    Coughing up blood    Chest pain gets worse with breathing    Fever of 102 F (38 C) or higher that doesn t get better with fever medicine    Weakness, dizziness, or fainting that gets worse    Thirst or dry mouth that gets worse    Sinus pain, headache, or a stiff neck    Chest pain not caused by coughing    9826-0380 [employer]. [address] [place] [address]27. All rights reserved. This information is not intended as a substitute for professional medical care. Always follow your healthcare professional's instructions.

## 2017-01-22 NOTE — PLAN OF CARE
dischged with family after instructions reviewed and understanding and compliance with plan acknowldeged

## 2017-01-22 NOTE — PLAN OF CARE
Problem: Goal Outcome Summary  Goal: Goal Outcome Summary  Outcome: No Change  Temp: 97.9  F (36.6  C) Temp src: Oral BP: 147/69 mmHg   Heart Rate: 83 Resp: 18 SpO2: 93 % O2 Device: None (Room air)      Elevated BP, all other VSS, up with assist of 1 and walker, A&O x4. Tele is SR. Plans for DC home today.

## 2017-01-22 NOTE — PROGRESS NOTES
POTASSIUM    Date  Value  Ref Range  Status    01/22/2017   4.7 3.4 - 5.3 mmol/L  Final      HGB    8.0     1/22/2017  Weight: 82.6    DIALYSIS PROCEDURE NOTE    Patient dialyzed for 3.5 hrs. on a 3 K bath with a net fluid removal of 3L.  A BFR of 400 ml/min was obtained via a LUAF using 15gauge needles.    The patient was seen by  during the treatment.  Total heparin received during the treatment: 0 units. Sites held x 15 min then  drsgs applied.  Meds  given:EPO 10,000units IV   Complications:  Procedure and ESRD teaching done and questions answered. See flowsheet in EPIC for further details and post assessment.  Machine water alarm in place and functioning.  Pt dialyzed in 315 (acute room).  Vascular Access: Aseptic prep done for both on/off.  Report received from,Jose Limon R.N.  Report given to: Mary Jo Severance, R.n.  HEPATITIS B SURFACE ANTIGEN neg DATE 1/18/2017  HEPATITIS B SURFACE ANTIBODY >10, immune  Date: 1/18/2017  Chlorine/Chloramine water system checked every 4 hours.  Outpatient Dialysis at [facility]  Colleen Ann RN  [facility]

## 2017-01-25 ENCOUNTER — HOSPITAL ENCOUNTER (INPATIENT)
Facility: CLINIC | Age: 67
LOS: 4 days | Discharge: HOME HEALTH | DRG: 391 | End: 2017-01-29
Attending: EMERGENCY MEDICINE | Admitting: INTERNAL MEDICINE
Payer: MEDICARE

## 2017-01-25 ENCOUNTER — APPOINTMENT (OUTPATIENT)
Dept: GENERAL RADIOLOGY | Facility: CLINIC | Age: 67
DRG: 391 | End: 2017-01-25
Attending: EMERGENCY MEDICINE
Payer: MEDICARE

## 2017-01-25 DIAGNOSIS — N18.6 TYPE 2 DIABETES MELLITUS WITH CHRONIC KIDNEY DISEASE ON CHRONIC DIALYSIS, UNSPECIFIED LONG TERM INSULIN USE STATUS: Primary | ICD-10-CM

## 2017-01-25 DIAGNOSIS — R19.7 VOMITING AND DIARRHEA: ICD-10-CM

## 2017-01-25 DIAGNOSIS — R11.10 VOMITING AND DIARRHEA: ICD-10-CM

## 2017-01-25 DIAGNOSIS — Z99.2 TYPE 2 DIABETES MELLITUS WITH CHRONIC KIDNEY DISEASE ON CHRONIC DIALYSIS, UNSPECIFIED LONG TERM INSULIN USE STATUS: Primary | ICD-10-CM

## 2017-01-25 DIAGNOSIS — E87.8 HYPOCHLOREMIA: ICD-10-CM

## 2017-01-25 DIAGNOSIS — E87.1 HYPONATREMIA: ICD-10-CM

## 2017-01-25 DIAGNOSIS — E11.22 TYPE 2 DIABETES MELLITUS WITH CHRONIC KIDNEY DISEASE ON CHRONIC DIALYSIS, UNSPECIFIED LONG TERM INSULIN USE STATUS: Primary | ICD-10-CM

## 2017-01-25 LAB
ANION GAP SERPL CALCULATED.3IONS-SCNC: 10 MMOL/L (ref 3–14)
BASOPHILS # BLD AUTO: 0 10E9/L (ref 0–0.2)
BASOPHILS NFR BLD AUTO: 0.4 %
BUN SERPL-MCNC: 53 MG/DL (ref 7–30)
C DIFF TOX B STL QL: ABNORMAL
CALCIUM SERPL-MCNC: 9.3 MG/DL (ref 8.5–10.1)
CHLORIDE SERPL-SCNC: 87 MMOL/L (ref 94–109)
CO2 SERPL-SCNC: 26 MMOL/L (ref 20–32)
CREAT SERPL-MCNC: 7.34 MG/DL (ref 0.66–1.25)
DIFFERENTIAL METHOD BLD: ABNORMAL
EOSINOPHIL # BLD AUTO: 0 10E9/L (ref 0–0.7)
EOSINOPHIL NFR BLD AUTO: 0.3 %
ERYTHROCYTE [DISTWIDTH] IN BLOOD BY AUTOMATED COUNT: 16.8 % (ref 10–15)
GFR SERPL CREATININE-BSD FRML MDRD: 7 ML/MIN/1.7M2
GLUCOSE BLDC GLUCOMTR-MCNC: 125 MG/DL (ref 70–99)
GLUCOSE BLDC GLUCOMTR-MCNC: 130 MG/DL (ref 70–99)
GLUCOSE SERPL-MCNC: 151 MG/DL (ref 70–99)
HCT VFR BLD AUTO: 32.7 % (ref 40–53)
HGB BLD-MCNC: 10.2 G/DL (ref 13.3–17.7)
IMM GRANULOCYTES # BLD: 0 10E9/L (ref 0–0.4)
IMM GRANULOCYTES NFR BLD: 0.4 %
INTERPRETATION ECG - MUSE: NORMAL
LACTATE BLD-SCNC: 0.9 MMOL/L (ref 0.7–2.1)
LYMPHOCYTES # BLD AUTO: 0.4 10E9/L (ref 0.8–5.3)
LYMPHOCYTES NFR BLD AUTO: 5.5 %
MAGNESIUM SERPL-MCNC: 2.1 MG/DL (ref 1.6–2.3)
MCH RBC QN AUTO: 29.3 PG (ref 26.5–33)
MCHC RBC AUTO-ENTMCNC: 31.2 G/DL (ref 31.5–36.5)
MCV RBC AUTO: 94 FL (ref 78–100)
MONOCYTES # BLD AUTO: 0.4 10E9/L (ref 0–1.3)
MONOCYTES NFR BLD AUTO: 5.5 %
NEUTROPHILS # BLD AUTO: 6.5 10E9/L (ref 1.6–8.3)
NEUTROPHILS NFR BLD AUTO: 87.9 %
NRBC # BLD AUTO: 0 10*3/UL
NRBC BLD AUTO-RTO: 0 /100
PLATELET # BLD AUTO: 213 10E9/L (ref 150–450)
POTASSIUM SERPL-SCNC: 5 MMOL/L (ref 3.4–5.3)
RBC # BLD AUTO: 3.48 10E12/L (ref 4.4–5.9)
SODIUM SERPL-SCNC: 123 MMOL/L (ref 133–144)
SODIUM SERPL-SCNC: 136 MMOL/L (ref 133–144)
SPECIMEN SOURCE: ABNORMAL
WBC # BLD AUTO: 7.4 10E9/L (ref 4–11)

## 2017-01-25 PROCEDURE — 25000125 ZZHC RX 250: Performed by: INTERNAL MEDICINE

## 2017-01-25 PROCEDURE — 00000146 ZZHCL STATISTIC GLUCOSE BY METER IP

## 2017-01-25 PROCEDURE — 99285 EMERGENCY DEPT VISIT HI MDM: CPT | Mod: 25

## 2017-01-25 PROCEDURE — 87493 C DIFF AMPLIFIED PROBE: CPT | Performed by: INTERNAL MEDICINE

## 2017-01-25 PROCEDURE — 93005 ELECTROCARDIOGRAM TRACING: CPT

## 2017-01-25 PROCEDURE — 12000007 ZZH R&B INTERMEDIATE

## 2017-01-25 PROCEDURE — 25000131 ZZH RX MED GY IP 250 OP 636 PS 637: Mod: GY | Performed by: INTERNAL MEDICINE

## 2017-01-25 PROCEDURE — A9270 NON-COVERED ITEM OR SERVICE: HCPCS | Mod: GY | Performed by: EMERGENCY MEDICINE

## 2017-01-25 PROCEDURE — 85025 COMPLETE CBC W/AUTO DIFF WBC: CPT | Performed by: EMERGENCY MEDICINE

## 2017-01-25 PROCEDURE — 71010 XR CHEST PORT 1 VW: CPT

## 2017-01-25 PROCEDURE — 83605 ASSAY OF LACTIC ACID: CPT | Performed by: INTERNAL MEDICINE

## 2017-01-25 PROCEDURE — 25000132 ZZH RX MED GY IP 250 OP 250 PS 637: Mod: GY | Performed by: EMERGENCY MEDICINE

## 2017-01-25 PROCEDURE — 83735 ASSAY OF MAGNESIUM: CPT | Performed by: EMERGENCY MEDICINE

## 2017-01-25 PROCEDURE — A9270 NON-COVERED ITEM OR SERVICE: HCPCS | Mod: GY | Performed by: INTERNAL MEDICINE

## 2017-01-25 PROCEDURE — 99223 1ST HOSP IP/OBS HIGH 75: CPT | Mod: AI | Performed by: INTERNAL MEDICINE

## 2017-01-25 PROCEDURE — 25000128 H RX IP 250 OP 636: Performed by: INTERNAL MEDICINE

## 2017-01-25 PROCEDURE — 25000125 ZZHC RX 250: Performed by: EMERGENCY MEDICINE

## 2017-01-25 PROCEDURE — 25000128 H RX IP 250 OP 636: Performed by: EMERGENCY MEDICINE

## 2017-01-25 PROCEDURE — 25000132 ZZH RX MED GY IP 250 OP 250 PS 637: Mod: GY | Performed by: INTERNAL MEDICINE

## 2017-01-25 PROCEDURE — 87506 IADNA-DNA/RNA PROBE TQ 6-11: CPT | Performed by: INTERNAL MEDICINE

## 2017-01-25 PROCEDURE — 80048 BASIC METABOLIC PNL TOTAL CA: CPT | Performed by: EMERGENCY MEDICINE

## 2017-01-25 PROCEDURE — 96361 HYDRATE IV INFUSION ADD-ON: CPT

## 2017-01-25 PROCEDURE — 84295 ASSAY OF SERUM SODIUM: CPT | Performed by: INTERNAL MEDICINE

## 2017-01-25 PROCEDURE — 36415 COLL VENOUS BLD VENIPUNCTURE: CPT | Performed by: INTERNAL MEDICINE

## 2017-01-25 PROCEDURE — 96374 THER/PROPH/DIAG INJ IV PUSH: CPT

## 2017-01-25 RX ORDER — ONDANSETRON 4 MG/1
4 TABLET, ORALLY DISINTEGRATING ORAL EVERY 6 HOURS PRN
Status: DISCONTINUED | OUTPATIENT
Start: 2017-01-25 | End: 2017-01-29 | Stop reason: HOSPADM

## 2017-01-25 RX ORDER — HYDRALAZINE HYDROCHLORIDE 20 MG/ML
10 INJECTION INTRAMUSCULAR; INTRAVENOUS EVERY 4 HOURS PRN
Status: DISCONTINUED | OUTPATIENT
Start: 2017-01-25 | End: 2017-01-29 | Stop reason: HOSPADM

## 2017-01-25 RX ORDER — ABACAVIR 300 MG/1
600 TABLET ORAL EVERY EVENING
Status: DISCONTINUED | OUTPATIENT
Start: 2017-01-25 | End: 2017-01-29 | Stop reason: HOSPADM

## 2017-01-25 RX ORDER — ONDANSETRON 2 MG/ML
4 INJECTION INTRAMUSCULAR; INTRAVENOUS ONCE
Status: DISCONTINUED | OUTPATIENT
Start: 2017-01-25 | End: 2017-01-25

## 2017-01-25 RX ORDER — GABAPENTIN 300 MG/1
300 CAPSULE ORAL EVERY MORNING
Status: DISCONTINUED | OUTPATIENT
Start: 2017-01-26 | End: 2017-01-29 | Stop reason: HOSPADM

## 2017-01-25 RX ORDER — CARVEDILOL 12.5 MG/1
12.5 TABLET ORAL 2 TIMES DAILY WITH MEALS
Status: DISCONTINUED | OUTPATIENT
Start: 2017-01-25 | End: 2017-01-29 | Stop reason: HOSPADM

## 2017-01-25 RX ORDER — ASPIRIN 81 MG/1
81 TABLET, CHEWABLE ORAL EVERY EVENING
Status: DISCONTINUED | OUTPATIENT
Start: 2017-01-25 | End: 2017-01-29 | Stop reason: HOSPADM

## 2017-01-25 RX ORDER — CLOPIDOGREL BISULFATE 75 MG/1
75 TABLET ORAL EVERY EVENING
Status: DISCONTINUED | OUTPATIENT
Start: 2017-01-25 | End: 2017-01-29 | Stop reason: HOSPADM

## 2017-01-25 RX ORDER — DEXTROSE MONOHYDRATE 25 G/50ML
25-50 INJECTION, SOLUTION INTRAVENOUS
Status: DISCONTINUED | OUTPATIENT
Start: 2017-01-25 | End: 2017-01-29 | Stop reason: HOSPADM

## 2017-01-25 RX ORDER — RITONAVIR 100 MG/1
100 TABLET ORAL AT BEDTIME
Status: DISCONTINUED | OUTPATIENT
Start: 2017-01-25 | End: 2017-01-29 | Stop reason: HOSPADM

## 2017-01-25 RX ORDER — MYCOPHENOLATE MOFETIL 250 MG/1
500 CAPSULE ORAL 2 TIMES DAILY
Status: DISCONTINUED | OUTPATIENT
Start: 2017-01-25 | End: 2017-01-29 | Stop reason: HOSPADM

## 2017-01-25 RX ORDER — DAPSONE 25 MG/1
100 TABLET ORAL AT BEDTIME
Status: DISCONTINUED | OUTPATIENT
Start: 2017-01-25 | End: 2017-01-29 | Stop reason: HOSPADM

## 2017-01-25 RX ORDER — ONDANSETRON 2 MG/ML
4 INJECTION INTRAMUSCULAR; INTRAVENOUS EVERY 30 MIN PRN
Status: DISCONTINUED | OUTPATIENT
Start: 2017-01-25 | End: 2017-01-29 | Stop reason: HOSPADM

## 2017-01-25 RX ORDER — ONDANSETRON 2 MG/ML
4 INJECTION INTRAMUSCULAR; INTRAVENOUS EVERY 6 HOURS PRN
Status: DISCONTINUED | OUTPATIENT
Start: 2017-01-25 | End: 2017-01-29 | Stop reason: HOSPADM

## 2017-01-25 RX ORDER — NITROGLYCERIN 0.4 MG/1
0.4 TABLET SUBLINGUAL EVERY 5 MIN PRN
Status: DISCONTINUED | OUTPATIENT
Start: 2017-01-25 | End: 2017-01-29 | Stop reason: HOSPADM

## 2017-01-25 RX ORDER — GABAPENTIN 300 MG/1
600 CAPSULE ORAL EVERY EVENING
Status: DISCONTINUED | OUTPATIENT
Start: 2017-01-25 | End: 2017-01-29 | Stop reason: HOSPADM

## 2017-01-25 RX ORDER — PROCHLORPERAZINE 25 MG
12.5 SUPPOSITORY, RECTAL RECTAL EVERY 12 HOURS PRN
Status: DISCONTINUED | OUTPATIENT
Start: 2017-01-25 | End: 2017-01-29 | Stop reason: HOSPADM

## 2017-01-25 RX ORDER — LIDOCAINE 40 MG/G
CREAM TOPICAL
Status: DISCONTINUED | OUTPATIENT
Start: 2017-01-25 | End: 2017-01-29 | Stop reason: HOSPADM

## 2017-01-25 RX ORDER — NALOXONE HYDROCHLORIDE 0.4 MG/ML
.1-.4 INJECTION, SOLUTION INTRAMUSCULAR; INTRAVENOUS; SUBCUTANEOUS
Status: DISCONTINUED | OUTPATIENT
Start: 2017-01-25 | End: 2017-01-29 | Stop reason: HOSPADM

## 2017-01-25 RX ORDER — ACETAMINOPHEN 325 MG/1
650 TABLET ORAL EVERY 4 HOURS PRN
Status: DISCONTINUED | OUTPATIENT
Start: 2017-01-25 | End: 2017-01-29 | Stop reason: HOSPADM

## 2017-01-25 RX ORDER — PROCHLORPERAZINE MALEATE 5 MG
5 TABLET ORAL EVERY 6 HOURS PRN
Status: DISCONTINUED | OUTPATIENT
Start: 2017-01-25 | End: 2017-01-29 | Stop reason: HOSPADM

## 2017-01-25 RX ORDER — CLONAZEPAM 0.5 MG/1
0.5 TABLET ORAL
Status: DISCONTINUED | OUTPATIENT
Start: 2017-01-25 | End: 2017-01-29 | Stop reason: HOSPADM

## 2017-01-25 RX ORDER — NICOTINE POLACRILEX 4 MG
15-30 LOZENGE BUCCAL
Status: DISCONTINUED | OUTPATIENT
Start: 2017-01-25 | End: 2017-01-29 | Stop reason: HOSPADM

## 2017-01-25 RX ORDER — ACETAMINOPHEN 325 MG/1
650 TABLET ORAL ONCE
Status: COMPLETED | OUTPATIENT
Start: 2017-01-25 | End: 2017-01-25

## 2017-01-25 RX ORDER — OXYCODONE HYDROCHLORIDE 5 MG/1
5-10 TABLET ORAL
Status: DISCONTINUED | OUTPATIENT
Start: 2017-01-25 | End: 2017-01-29 | Stop reason: HOSPADM

## 2017-01-25 RX ADMIN — ACETAMINOPHEN 650 MG: 325 TABLET, FILM COATED ORAL at 14:44

## 2017-01-25 RX ADMIN — MYCOPHENOLATE MOFETIL 500 MG: 250 CAPSULE ORAL at 22:25

## 2017-01-25 RX ADMIN — ONDANSETRON 4 MG: 2 INJECTION INTRAMUSCULAR; INTRAVENOUS at 12:46

## 2017-01-25 RX ADMIN — ONDANSETRON 4 MG: 2 INJECTION INTRAMUSCULAR; INTRAVENOUS at 20:17

## 2017-01-25 RX ADMIN — CARVEDILOL 12.5 MG: 12.5 TABLET, FILM COATED ORAL at 22:25

## 2017-01-25 RX ADMIN — SODIUM CHLORIDE 250 ML: 9 INJECTION, SOLUTION INTRAVENOUS at 15:06

## 2017-01-25 RX ADMIN — SODIUM CHLORIDE 500 ML: 9 INJECTION, SOLUTION INTRAVENOUS at 18:23

## 2017-01-25 ASSESSMENT — ENCOUNTER SYMPTOMS
NAUSEA: 1
VOMITING: 1
FACIAL SWELLING: 1
ABDOMINAL PAIN: 1
DIARRHEA: 1

## 2017-01-25 NOTE — IP AVS SNAPSHOT
[place] [address] [facility]    [address]    [facility] [address]    Phone:  [phone]    Fax:  [phone]                                       After Visit Summary   1/25/2017    Donald Raza    MRN: [id number]           After Visit Summary Signature Page     I have received my discharge instructions, and my questions have been answered. I have discussed any challenges I see with this plan with the nurse or doctor.    ..........................................................................................................................................  Patient/Patient Representative Signature      ..........................................................................................................................................  Patient Representative Print Name and Relationship to Patient    ..................................................               ................................................  Date                                            Time    ..........................................................................................................................................  Reviewed by Signature/Title    ...................................................              ..............................................  Date                                                            Time

## 2017-01-25 NOTE — ED NOTES
Pt arrives via EMS from home with c/o n/v and diarrhea for past few days. Dialysis pt, had to cut yesterdays run short d/t weakness. Dialysis T/Th/Sat. No CP, SOB. Pt d/c on 1/22 for pneumonia, hasn't felt well since discharge. ABC intact.

## 2017-01-25 NOTE — PHARMACY-ADMISSION MEDICATION HISTORY
Admission medication history interview status for this patient is complete. See [facility] admission navigator for allergy information, prior to admission medications and immunization status.     Medication history interview source(s):Patient and Family  Medication history resources (including written lists, pill bottles, clinic record):list  Primary pharmacy:[facility]    Changes made to PTA medication list:  Added: -  Deleted: -  Changed: -    Actions taken by pharmacist (provider contacted, etc):None     Additional medication history information:None    Medication reconciliation/reorder completed by provider prior to medication history? No    For patients on insulin therapy: Yes  Lantus 50 units in AM  Sliding scale lispro Yes  If Yes, do you have a baseline lispro pre-meal dose:  15units with meals   Patients eat three meals a day:   sometimes not always     Any Barriers to therapy:  cost of medications/comfortable with giving injections (if applicable)/ comfortable and confident with current diabetes regimen : No      Prior to Admission medications    Medication Sig Last Dose Taking? Auth Provider   insulin lispro (HUMALOG KWIKPEN) 100 UNIT/ML injection Inject Subcutaneous 3 times daily (before meals) Sliding scale - 2 units per 50 over 150 1/24/2017 at Unknown time Yes Unknown, Entered By History   Multiple Vitamins-Minerals (DIALYVITE 800/ULTRA D) TABS Take 1 tablet by mouth daily 1/24/2017 at Unknown time Yes Unknown, Entered By History   Omega-3 Fatty Acids (OMEGA-3 FISH OIL PO) Take 2 g by mouth 2 times daily (with meals) 1/24/2017 at Unknown time Yes Unknown, Entered By History   mycophenolate (CELLCEPT - GENERIC EQUIVALENT) 500 MG tablet Take 500 mg by mouth 2 times daily On an empty stomach 1/24/2017 at Unknown time Yes Reported, Patient   isosorbide mononitrate (IMDUR) 30 MG 24 hr tablet Take 2 tablets (60 mg) by mouth every evening 1/24/2017 at Unknown time Yes Pradeep  Glenna SORENSEN MD   hydrALAZINE (APRESOLINE) 50 MG tablet Take 0.5 tablets (25 mg) by mouth 3 times daily 1/24/2017 at Unknown time Yes Glenna Fernández MD   losartan (COZAAR) 100 MG tablet Take 1 tablet (100 mg) by mouth At Bedtime 1/24/2017 at Unknown time Yes Glenna Fernández MD   carvedilol (COREG) 12.5 MG tablet Take 1 tablet (12.5 mg) by mouth 2 times daily (with meals) 1/24/2017 at Unknown time Yes Glenna Fernández MD   amLODIPine (NORVASC) 5 MG tablet Take 1 tablet (5 mg) by mouth At Bedtime 1/24/2017 at Unknown time Yes Glenna Fernández MD   gabapentin (NEURONTIN) 300 MG capsule Take 600 mg by mouth every evening 1/24/2017 at Unknown time Yes Unknown, Entered By History   insulin glargine (LANTUS) 100 UNIT/ML PEN Inject 50 Units Subcutaneous every morning 1/24/2017 at Unknown time Yes Unknown, Entered By History   insulin lispro (HUMALOG KWIKPEN) 100 UNIT/ML soln Inject 15 Units Subcutaneous 3 times daily (before meals) 1/24/2017 at Unknown time Yes Unknown, Entered By History   nystatin (MYCOSTATIN) cream Apply topically daily as needed for dry skin  Yes Reported, Patient   imiquimod (ALDARA) 5 % cream Apply topically as needed  Yes Reported, Patient   DOXERCALCIFEROL IV Inject 6 mcg into the vein three times a week  Yes Reported, Patient   ASPIRIN PO Take 81 mg by mouth every evening  1/24/2017 at Unknown time Yes Unknown, Entered By History   CLONAZEPAM PO Take 0.5 mg by mouth nightly as needed for anxiety (restless legs)  Yes Unknown, Entered By History   CLOPIDOGREL BISULFATE PO Take 75 mg by mouth every evening  1/24/2017 at Unknown time Yes Unknown, Entered By History   abacavir (ZIAGEN) 300 MG tablet Take 600 mg by mouth every evening  1/24/2017 at Unknown time Yes Abstract, Provider   calcium acetate (PHOSLO) 667 MG CAPS 2,001 mg 3 times daily (with meals) Take 3 capsules three times a day with meals 1/24/2017 at Unknown time Yes Abstract, Provider   chlorhexidine (CHLORHEXIDINE) 0.12 % solution Rinse and gargle  "15 ml by mouth twice a day as directed. 1/24/2017 at Unknown time Yes Abstract, Provider   hydrocortisone (ANUCORT-HC) 25 MG suppository Place 25 mg rectally 2 times daily as needed   Yes Abstract, Provider   terbinafine (LAMISIL AT) 1 % cream Apply topically 2 times daily as needed  1/24/2017 at Unknown time Yes Abstract, Provider   atorvastatin (LIPITOR) 40 MG tablet Take 40 mg by mouth At Bedtime 1/24/2017 at Unknown time Yes Unknown, Entered By History   epoetin brittni (EPOGEN,PROCRIT) 04828 UNIT/ML injection Inject 11,000 Units Subcutaneous three times a week WITH DIALYSIS  Yes Unknown, Entered By History   iron sucrose (VENOFER) 20 MG/ML injection Inject 50 mg into the vein once a week WIth dialysis  Yes Unknown, Entered By History   MAGNESIUM OXIDE PO Take 400 mg by mouth At Bedtime 1/24/2017 at Unknown time Yes Unknown, Entered By History   dolutegravir (TIVICAY) 50 MG tablet Take 50 mg by mouth At Bedtime 1/24/2017 at Unknown time Yes Unknown, Entered By History   nitroglycerin (NITROSTAT) 0.4 MG SL tablet One tablet under the tongue every 5 minutes if needed for chest pain. May repeat every 5 minutes for a maximum of 3 doses in 15 minutes\"  Yes Lay aPz MD   gabapentin (NEURONTIN) 300 MG capsule Take 300 mg by mouth every morning  1/24/2017 at Unknown time Yes Unknown, Entered By History   dapsone 100 MG tablet Take 100 mg by mouth At Bedtime  1/24/2017 at Unknown time Yes Reported, Patient   Darunavir Ethanolate (PREZISTA PO) Take 800 mg by mouth At Bedtime. 1/24/2017 at Unknown time Yes Reported, Patient   ritonavir (NORVIR) 100 MG capsule Take 1 capsule by mouth At Bedtime  1/24/2017 at Unknown time Yes Reported, Patient   order for DME Equipment being ordered: Other: 4WW  Treatment Diagnosis: Decreased activity tolerance   Lorna Jhaveri MD           "

## 2017-01-25 NOTE — ED PROVIDER NOTES
History     Chief Complaint:  Nausea, Vomiting, & Diarrhea    The history is provided by the patient and a relative.      Donald Raza is a 66 year old male with a history of CKD on dialysis, diabetes, hypertension, HIV, NSTEMI, prostate cancer and colon cancer who presents to the emergency department today for evaluation of nausea, vomiting and diarrhea. The patient was recently discharged from the hospital on 1/22 after a multiple week stay in which he has to be intubated. He was at dialysis yesterday and had to stop his treatment about 15 minutes early because he wasn't feeling well. His daughter states that they arrived home, he ate dinner and began feeling sick around 9 PM last night. She states that a flu virus has been going around their house with several members of the family having had symptoms of nausea and vomiting that lasted about a day. The patient's daughter notes that her father seems to have been hit harder by these symptoms. He has had some abdominal pain as well as nausea and multiple episodes of diarrhea and vomiting. He has been unable to take any of his medications today.     Additionally, the patient still has swelling to the left side of his neck and some pain from his intubation. The patient's daughter also states that the swelling on his left cheek is likely due to a salivary stone and it was recommended that the patient use lemon drops and warm compresses. However, because he is diabetic and his family has been unable to find any sugar free lemon drops so they have only been able to give him limited lemon drops throughout the day     Allergies:  Lisinopril   Sulfa Drugs      Medications:    Insulin   Multiple Vitamins Minerals    Cellcept  Omega 3 Fish  Imdur  Appresoline  Cozaar   Coreg  Norvasc  Gabapentin   Lantus   Mycostatin   Aspirin  Clonazepam   Clopidogrel Bisulfate  Ziagen   Phoslol   Chlorhexidine   Anucort   Lamisil   Lipitor   Epoetin Dwayne  Venofer  Magnesium  oxide  Tivicay   Nitroglycerin   Dapsone   Prezista  Norvir    Past Medical History:    Type 2 diabetes mellitus   HIV   Allergic rhinitis  Impotence of organic origin   Huang disease  Hypertension   CKD   Mixed hyperlipidemia   CAD  NSTEMI   Pulmonary hypertension   Near syncope   TIA  Increased prostate specific antigen velocity   Bilateral pneumonia    Prostate cancer  Colon cancer     Past Surgical History:    Cholecystectomy, laporoscopic   Colostomy (1999)   Appendectomy   Angiogram (2000, 2016)   Stent, coronary (2015 x2)  Heart cath, angioplasty (2016)     Family History:    Brother - Heart Disease, CABG, Dilated Aorta  Sister - Kidney Disease, Hypertension      Social History:  The patient was accompanied to the ED by his daughter.  Smoking Status: Former Smoker  Alcohol Use: Negative  Marital Status:   [4]     Review of Systems   HENT: Positive for facial swelling (left side).    Gastrointestinal: Positive for nausea, vomiting, abdominal pain and diarrhea.   All other systems reviewed and are negative.    Physical Exam   Vitals:   Patient Vitals for the past 24 hrs:   BP Temp Temp src Heart Rate Resp SpO2   01/25/17 1430 94/64 mmHg - - - - 91 %   01/25/17 1423 - 103  F (39.4  C) Oral - - -   01/25/17 1415 (!) 86/66 mmHg - - - - -   01/25/17 1400 119/77 mmHg - - - - -   01/25/17 1345 129/73 mmHg - - - - 95 %   01/25/17 1330 130/67 mmHg - - - - 96 %   01/25/17 1315 139/73 mmHg - - - - 92 %   01/25/17 1300 118/58 mmHg - - - - (!) 89 %   01/25/17 1245 - - - - - 97 %   01/25/17 1230 - - - - - 96 %   01/25/17 1221 131/71 mmHg 100.3  F (37.9  C) Temporal 100 18 96 %   01/25/17 1215 - - - - - 96 %        Physical Exam  Constitutional: Patient appears well-developed and well-nourished. Patient is in mild distress  HENT:    Head: No external signs of trauma noted. There is swelling to the left parotid area. No erythema, drainage, or warmth noted.   Eyes: Conjunctivae are normal. Pupils are equal, round, and  reactive to light.   Cardiovascular:    Tachycardic rate, regular rhythm and normal heart sounds.     Exam reveals no friction rub.     No murmur heard.  Pulmonary/Chest:    Effort normal and breath sounds normal.    No respiratory distress.    There are no wheezes.    There are no rales.   Abdominal:    Soft.    Bowel sounds normal.    There is no distension.    There is no tenderness.    There is no rebound or guarding.   Musculoskeletal:    Normal range of motion.    Normal Tone   LUE AV fistula. Good thrill. No erythema or warmth noted.  Neurological: Patient is alert and oriented to person, place, and time.   Skin: Skin is warm and dry. Patient is not diaphoretic. There is low abdomen bruising noted, most likely due to subQ Heparin from his inpatient stay.     Emergency Department Course     ECG:  ECG taken at 1204, ECG read at 1209  Sinus tachycardia   Inferior infarct, age undetermined  No significant changed compared to EKG dated 1/3/2017  Rate 102 bpm. MS interval 144. QRS duration 82. QT/QTc 334/435. P-R-T axes 23 16 52.    Imaging:  Radiology findings were communicated with the patient who voiced understanding of the findings.    Chest XR, 1 view PORTABLE:   IMPRESSION: Shallow inspiration with some atelectasis or infiltrate  left lung base.  Reading per radiology    Laboratory:  Laboratory findings were communicated with the patient who voiced understanding of the findings.    CBC: HGB 10.2 (L) (WBC 7.4, )   BMP:  (L), Chloride 87 (L), Glucose 151 (H), BUN 53 (H), Creatinine 7.34 (H), GFR Estimate 7 (L)  Magnesium: 2.1    UA with Microscopic: Pending  Urine Culture: Pending    Interventions:  1246 Zofran 4 mg IV   1444 Tylenol 650 mg PO  1506 ns 250 mL IV     Emergency Department Course:  Nursing notes and vitals reviewed.  I performed an exam of the patient as documented above.   IV was inserted and blood was drawn for laboratory testing, results above.  At 1424 the patient was rechecked  and was updated on the results of his laboratory studies.   1455 I spoke with Dr. Chavez of the patient's dialysis center regarding patient's presentation, findings, and plan of care.  1621: Discussed the case with Dr. Bang of the hospitalist service who accepts the patient for admission to a monitored bed.    I discussed the treatment plan with the patient and daughter. They expressed understanding of this plan and consented to admission. I discussed the patient with Dr. Bang, who will admit the patient to a monitored bed for further evaluation and treatment.  I personally reviewed the laboratory and imaging results with the patient and answered all related questions prior to admission.    Impression & Plan      Medical Decision Making:  Donald Raza is a 66 year old male in room 1. Patient presents to the ER for evaluation of nausea, vomiting and diarrhea. The patient and family state that there has been, as they call it, stomach bug going through their home. Family members have gotten nausea, vomiting and diarrhea but this has only been for a day or so. The patient was recently inpatient for quite awhile and intubated due to pneumonia and respiratory failure. The family was concerned enough about his GI symptoms today that they wanted to bring him in early rather than waiting for things to get worse.    The patient has received dialysis yesterday. He had to stop about 15 minutes early as he was getting nauseated and feeling GI symptoms. His blood work demonstrated hyponatremia and hypochloremia with a normal anion gap. His white blood cell count is normal as well. His x-ray demonstrated possible shallow respiration with some atelectasis or maybe an infiltrate at the left lung base. The patient is running a temperature here in the ER but has had no cough and no shaking chills. The X-ray findings could also represent improvement from his hospital course.     The patient also has a left sided  sialithiasis. They have been doing warm compresses at home as instructed at the time of his previous hospital discharge. Sour lemon candies were also recommended, but family states that due to his diabetes, they have limited use of these candies as they have not found a sugar-free option.    I think at this point we should still place him in the hospital for close monitoring of his electrolyte status given his vomiting and diarrhea but also close monitoring of the chest x-ray findings and ideally evaluation of his urine (as he still does make urine). His differential still does include things like pneumonia, but given his exposure at home it is probably more likely that this is the gastrointestinal illness that affected his family members. Perhaps as the patient gets stronger a 2 view chest x-ray could be obtained with a better inspiratory effort to get a better look at the chest itself.      Diagnosis:    ICD-10-CM    1. Vomiting and diarrhea R11.10     R19.7    2. Hyponatremia E87.1    3. Hypochloremia E87.8      Disposition:   Admission to a monitored bed.    Scribe Disclosure:  I, Wendy Moore, am serving as a scribe at 12:07 PM on 1/25/2017 to document services personally performed by Jules Ledesma DO, based on my observations and the provider's statements to me.    1/25/2017   [facility] EMERGENCY DEPARTMENT        Jules Ledesma DO  01/25/17 1627

## 2017-01-25 NOTE — IP AVS SNAPSHOT
MRN:[id number]                      After Visit Summary   1/25/2017    Donald Raza    MRN: [id number]           Thank you!     Thank you for choosing [facility] for your care. Our goal is always to provide you with excellent care. Hearing back from our patients is one way we can continue to improve our services. Please take a few minutes to complete the written survey that you may receive in the mail after you visit. If you would like to speak to someone directly about your visit please contact Patient Relations at [phone]. Thank you!          Patient Information     Date Of Birth          1950        About your hospital stay     You were admitted on:  January 25, 2017 You last received care in the:  [address] [facility]    You were discharged on:  January 29, 2017        Reason for your hospital stay       Acute gastroentritis                  Who to Call     For medical emergencies, please call 911.  For non-urgent questions about your medical care, please call your primary care provider or clinic, [phone]          Attending Provider     Provider    Jill Castro MD Burns, Bradley Joseph, DO Foehrenbacher, Sukh Yanez MD       Primary Care Provider Office Phone # Fax #    [facility] [place] [facility] [phone] [address] [place] [address]        After Care Instructions     Activity       Your activity upon discharge: activity as tolerated            Diet       Follow this diet upon discharge: Orders Placed This Encounter  Snacks/Supplements Adult: Nepro Oral Supplement; With Meals  Combination Diet Regular Diet Adult; Dialysis Diet                  Follow-up Appointments     Follow-up and recommended labs and tests        Follow up with primary care provider, [facility] [place] [facility], within 7 days  Continue outpatient dialysis as scheduled                  Pending Results  "    No orders found from 2017 to 2017.            Statement of Approval     Ordered          17 0812  I have reviewed and agree with all the recommendations and orders detailed in this document.   EFFECTIVE NOW     Approved and electronically signed by:  Lorna Jhaveri MD             Admission Information        Provider Department Dept Phone    2017 Sukh Bang MD  [facility] [phone]      Your Vitals Were     Blood Pressure Pulse Temperature Respirations Weight Pulse Oximetry    154/80 mmHg 81 95.6  F (35.3  C) (Axillary) 16 79.924 kg (176 lb 3.2 oz) 95%      MyChart Information     [facility] lets you send messages to your doctor, view your test results, renew your prescriptions, schedule appointments and more. To sign up, go to [email or web address] . Click on \"Log in\" on the left side of the screen, which will take you to the Welcome page. Then click on \"Sign up Now\" on the right side of the page.     You will be asked to enter the access code listed below, as well as some personal information. Please follow the directions to create your username and password.     Your access code is: [email or web address]  Expires: 2017  3:48 PM     Your access code will  in 90 days. If you need help or a new code, please call your [place] clinic or [phone].        Care EveryWhere ID     This is your Care EveryWhere ID. This could be used by other organizations to access your [place] medical records  [id number]           Review of your medicines      CONTINUE these medicines which may have CHANGED, or have new prescriptions. If we are uncertain of the size of tablets/capsules you have at home, strength may be listed as something that might have changed.        Dose / Directions    * HumaLOG KWIKpen 100 UNIT/ML injection   This may have changed:  Another medication with the same name was changed. Make sure you understand how and when to take each.   Generic " drug:  insulin lispro        Inject Subcutaneous 3 times daily (before meals) Sliding scale - 2 units per 50 over 150   Refills:  0       * insulin lispro 100 UNIT/ML injection   Commonly known as:  HumaLOG Chris   This may have changed:  how much to take   Used for:  Type 2 diabetes mellitus with chronic kidney disease on chronic dialysis, unspecified long term insulin use status (H)        Dose:  5 Units   Inject 5 Units Subcutaneous 3 times daily (before meals)   Refills:  0       * Notice:  This list has 2 medication(s) that are the same as other medications prescribed for you. Read the directions carefully, and ask your doctor or other care provider to review them with you.      CONTINUE these medicines which have NOT CHANGED        Dose / Directions    abacavir 300 MG tablet   Commonly known as:  ZIAGEN        Dose:  600 mg   Take 600 mg by mouth every evening   Refills:  0       amLODIPine 5 MG tablet   Commonly known as:  NORVASC   Used for:  Hypertension secondary to other renal disorders        Dose:  5 mg   Take 1 tablet (5 mg) by mouth At Bedtime   Quantity:  30 tablet   Refills:  0       ANUCORT-HC 25 MG Suppository   Generic drug:  hydrocortisone        Dose:  25 mg   Place 25 mg rectally 2 times daily as needed   Refills:  0       ASPIRIN PO        Dose:  81 mg   Take 81 mg by mouth every evening   Refills:  0       atorvastatin 40 MG tablet   Commonly known as:  LIPITOR        Dose:  40 mg   Take 40 mg by mouth At Bedtime   Refills:  0       calcium acetate 667 MG Caps capsule   Commonly known as:  PHOSLO        Dose:  2001 mg   2,001 mg 3 times daily (with meals) Take 3 capsules three times a day with meals   Refills:  0       carvedilol 12.5 MG tablet   Commonly known as:  COREG   Used for:  Coronary artery disease involving native heart, angina presence unspecified, unspecified vessel or lesion type        Dose:  12.5 mg   Take 1 tablet (12.5 mg) by mouth 2 times daily (with meals)   Quantity:   60 tablet   Refills:  0       chlorhexidine 0.12 % solution   Commonly known as:  PERIDEX        Rinse and gargle 15 ml by mouth twice a day as directed.   Refills:  0       CLONAZEPAM PO        Dose:  0.5 mg   Take 0.5 mg by mouth nightly as needed for anxiety (restless legs)   Refills:  0       CLOPIDOGREL BISULFATE PO        Dose:  75 mg   Take 75 mg by mouth every evening   Refills:  0       dapsone 100 MG tablet        Dose:  100 mg   Take 100 mg by mouth At Bedtime   Refills:  0       DIALYVITE 800/ULTRA D Tabs        Dose:  1 tablet   Take 1 tablet by mouth daily   Refills:  0       dolutegravir 50 MG tablet   Commonly known as:  TIVICAY        Dose:  50 mg   Take 50 mg by mouth At Bedtime   Refills:  0       DOXERCALCIFEROL IV        Dose:  6 mcg   Inject 6 mcg into the vein three times a week   Refills:  0       epoetin brittni 67801 UNIT/ML injection   Commonly known as:  EPOGEN/PROCRIT        Dose:  52896 Units   Inject 11,000 Units Subcutaneous three times a week WITH DIALYSIS   Refills:  0       * gabapentin 300 MG capsule   Commonly known as:  NEURONTIN        Dose:  300 mg   Take 300 mg by mouth every morning   Refills:  0       * gabapentin 300 MG capsule   Commonly known as:  NEURONTIN        Dose:  600 mg   Take 600 mg by mouth every evening   Refills:  0       hydrALAZINE 50 MG tablet   Commonly known as:  APRESOLINE   Used for:  Hypertension secondary to other renal disorders        Dose:  25 mg   Take 0.5 tablets (25 mg) by mouth 3 times daily   Quantity:  90 tablet   Refills:  0       imiquimod 5 % cream   Commonly known as:  ALDARA        Apply topically as needed   Refills:  0       insulin glargine 100 UNIT/ML injection   Commonly known as:  LANTUS        Dose:  50 Units   Inject 50 Units Subcutaneous every morning   Refills:  0       iron sucrose 20 MG/ML injection   Commonly known as:  VENOFER        Dose:  50 mg   Inject 50 mg into the vein once a week WIth dialysis   Refills:  0        "isosorbide mononitrate 30 MG 24 hr tablet   Commonly known as:  IMDUR   Used for:  Coronary artery disease involving native heart, angina presence unspecified, unspecified vessel or lesion type, Hypertension secondary to other renal disorders        Dose:  60 mg   Take 2 tablets (60 mg) by mouth every evening   Quantity:  30 tablet   Refills:  0       lamISIL AT 1 % cream   Generic drug:  terbinafine        Apply topically 2 times daily as needed   Refills:  0       losartan 100 MG tablet   Commonly known as:  COZAAR   Used for:  Hypertension secondary to other renal disorders        Dose:  100 mg   Take 1 tablet (100 mg) by mouth At Bedtime   Quantity:  30 tablet   Refills:  0       MAGNESIUM OXIDE PO        Dose:  400 mg   Take 400 mg by mouth At Bedtime   Refills:  0       mycophenolate 500 MG tablet   Commonly known as:  CELLCEPT - GENERIC EQUIVALENT        Dose:  500 mg   Take 500 mg by mouth 2 times daily On an empty stomach   Refills:  0       nitroglycerin 0.4 MG sublingual tablet   Commonly known as:  NITROSTAT   Used for:  Coronary artery disease due to lipid rich plaque, Status post coronary angioplasty        One tablet under the tongue every 5 minutes if needed for chest pain. May repeat every 5 minutes for a maximum of 3 doses in 15 minutes\"   Quantity:  25 tablet   Refills:  3       nystatin cream   Commonly known as:  MYCOSTATIN        Apply topically daily as needed for dry skin   Refills:  0       OMEGA-3 FISH OIL PO        Dose:  2 g   Take 2 g by mouth 2 times daily (with meals)   Refills:  0       order for DME   Used for:  SOB (shortness of breath), Generalized muscle weakness        Equipment being ordered: Other: 4WW Treatment Diagnosis: Decreased activity tolerance   Quantity:  1 each   Refills:  0       PREZISTA PO        Dose:  800 mg   Take 800 mg by mouth At Bedtime.   Refills:  0       ritonavir 100 MG capsule   Commonly known as:  NORVIR        Dose:  1 capsule   Take 1 capsule by " mouth At Bedtime   Refills:  0       * Notice:  This list has 2 medication(s) that are the same as other medications prescribed for you. Read the directions carefully, and ask your doctor or other care provider to review them with you.         Where to get your medicines      Some of these will need a paper prescription and others can be bought over the counter. Ask your nurse if you have questions.     You don't need a prescription for these medications    - insulin lispro 100 UNIT/ML injection             Protect others around you: Learn how to safely use, store and throw away your medicines at www.disposemymeds.org.             Medication List: This is a list of all your medications and when to take them. Check marks below indicate your daily home schedule. Keep this list as a reference.      Medications           Morning Afternoon Evening Bedtime As Needed    abacavir 300 MG tablet   Commonly known as:  ZIAGEN   Take 600 mg by mouth every evening   Last time this was given:  600 mg on 1/28/2017  7:07 PM                                amLODIPine 5 MG tablet   Commonly known as:  NORVASC   Take 1 tablet (5 mg) by mouth At Bedtime                                ANUCORT-HC 25 MG Suppository   Place 25 mg rectally 2 times daily as needed   Generic drug:  hydrocortisone                                ASPIRIN PO   Take 81 mg by mouth every evening   Last time this was given:  81 mg on 1/28/2017  7:07 PM                                atorvastatin 40 MG tablet   Commonly known as:  LIPITOR   Take 40 mg by mouth At Bedtime                                calcium acetate 667 MG Caps capsule   Commonly known as:  PHOSLO   2,001 mg 3 times daily (with meals) Take 3 capsules three times a day with meals   Last time this was given:  2,001 mg on 1/28/2017  6:59 PM                                carvedilol 12.5 MG tablet   Commonly known as:  COREG   Take 1 tablet (12.5 mg) by mouth 2 times daily (with meals)   Last time this  was given:  12.5 mg on 1/28/2017  7:07 PM                                chlorhexidine 0.12 % solution   Commonly known as:  PERIDEX   Rinse and gargle 15 ml by mouth twice a day as directed.                                CLONAZEPAM PO   Take 0.5 mg by mouth nightly as needed for anxiety (restless legs)                                CLOPIDOGREL BISULFATE PO   Take 75 mg by mouth every evening   Last time this was given:  75 mg on 1/28/2017  7:36 PM                                dapsone 100 MG tablet   Take 100 mg by mouth At Bedtime   Last time this was given:  100 mg on 1/28/2017 10:11 PM                                DIALYVITE 800/ULTRA D Tabs   Take 1 tablet by mouth daily                                dolutegravir 50 MG tablet   Commonly known as:  TIVICAY   Take 50 mg by mouth At Bedtime   Last time this was given:  50 mg on 1/28/2017 10:10 PM                                DOXERCALCIFEROL IV   Inject 6 mcg into the vein three times a week                                epoetin brittni 16744 UNIT/ML injection   Commonly known as:  EPOGEN/PROCRIT   Inject 11,000 Units Subcutaneous three times a week WITH DIALYSIS   Last time this was given:  10,000 Units on 1/28/2017  3:07 PM                                * gabapentin 300 MG capsule   Commonly known as:  NEURONTIN   Take 300 mg by mouth every morning   Last time this was given:  600 mg on 1/28/2017  7:36 PM                                * gabapentin 300 MG capsule   Commonly known as:  NEURONTIN   Take 600 mg by mouth every evening   Last time this was given:  600 mg on 1/28/2017  7:36 PM                                * HumaLOG KWIKpen 100 UNIT/ML injection   Inject Subcutaneous 3 times daily (before meals) Sliding scale - 2 units per 50 over 150   Generic drug:  insulin lispro                                * insulin lispro 100 UNIT/ML injection   Commonly known as:  HumaLOG KWIKpen   Inject 5 Units Subcutaneous 3 times daily (before meals)                  "               hydrALAZINE 50 MG tablet   Commonly known as:  APRESOLINE   Take 0.5 tablets (25 mg) by mouth 3 times daily                                imiquimod 5 % cream   Commonly known as:  ALDARA   Apply topically as needed                                insulin glargine 100 UNIT/ML injection   Commonly known as:  LANTUS   Inject 50 Units Subcutaneous every morning                                iron sucrose 20 MG/ML injection   Commonly known as:  VENOFER   Inject 50 mg into the vein once a week WIth dialysis                                isosorbide mononitrate 30 MG 24 hr tablet   Commonly known as:  IMDUR   Take 2 tablets (60 mg) by mouth every evening                                lamISIL AT 1 % cream   Apply topically 2 times daily as needed   Generic drug:  terbinafine                                losartan 100 MG tablet   Commonly known as:  COZAAR   Take 1 tablet (100 mg) by mouth At Bedtime                                MAGNESIUM OXIDE PO   Take 400 mg by mouth At Bedtime                                mycophenolate 500 MG tablet   Commonly known as:  CELLCEPT - GENERIC EQUIVALENT   Take 500 mg by mouth 2 times daily On an empty stomach                                nitroglycerin 0.4 MG sublingual tablet   Commonly known as:  NITROSTAT   One tablet under the tongue every 5 minutes if needed for chest pain. May repeat every 5 minutes for a maximum of 3 doses in 15 minutes\"                                nystatin cream   Commonly known as:  MYCOSTATIN   Apply topically daily as needed for dry skin                                OMEGA-3 FISH OIL PO   Take 2 g by mouth 2 times daily (with meals)                                order for DME   Equipment being ordered: Other: 4WW Treatment Diagnosis: Decreased activity tolerance                                PREZISTA PO   Take 800 mg by mouth At Bedtime.   Last time this was given:  800 mg on 1/28/2017 10:10 PM                                ritonavir " 100 MG capsule   Commonly known as:  NORVIR   Take 1 capsule by mouth At Bedtime                                * Notice:  This list has 4 medication(s) that are the same as other medications prescribed for you. Read the directions carefully, and ask your doctor or other care provider to review them with you.

## 2017-01-25 NOTE — H&P
[facility]  Hospitalist Admission Note  Name: Donald Raza    MRN: [id number]  YOB: 1950    Age: 66 year old  Date of admission: 1/25/2017  Primary care provider: [facility], [facility] [place]    Chief Complaint:  Nausea, vomiting and diarrhea    Donald Raza is a 66 year old male with PMH including DM2, HIV on ART, NSTEMI, CKD on HD, prostate and colon cancer who presents with nausea, vomiting and diarrhea three days after discharging from a ~20 day admission for HCAP at this facility.  He was intubated during that admission and now returns with a likely viral gastroenteritis based on similar symptoms of others at home but certainly his immunosuppressed state may be playing a role.    Assessment and Plan:   1. Nausea, vomiting and diarrhea with hyponatremia: this seems most likely to be due to a conventional viral gastroenteritis based on numerous sick contacts at home w/ self limited diseases.  Given his other co-morbidities and recent critical illness I suspect he simply has less reserve so became more ill.  I will check a c. Dif and stool viral/bacterial cultures given his recent hospital stay/antibiotic exposure and HIV status but if he improves as hoped I would limit broader workup.  If he fails to improve we'd need to broaden workup and consider involving ID and/or GI.  -check c. Dif PCR  -stool viral/stool cultures given HIV status  -NS bolus 500 cc, recheck sodium  -enteric precautions  -supportive cares with IV anti-emetics etc    2.   Abnormal CXR: he has no respiratory symptoms and my clinical suspicion for pneumonia at this time is low.  I think that monitoring clinically would be most reasonable and it is possible this is residual from his prior HCAP last admission.      3.   Recent hospitalization for HCAP from 1/3 to 1/22/17: completed abx course.  Had been discharged home and recovering appropriately.    4.   Hx of HIV on ART: resume usual anti-retrovirals once  doses are verified.  No hx of AIDS defining illness per report though his CD4 was 141 in March of 2016 which may suggest a diagnosis of AIDS may be reasonable.  In any case, per Dr. Wooten note from earlier this week he follows in [facility] and [facility] and his most recent CD4 was 263 about three weeks prior and HIV virus was not detectable.       5.   ESRD on HD: continue HD as per nephrology (consulted).  No acute indication right now.    6.  Hyponatremia: recheck.  Given small fluid bolus which should help.  Likely low due to severe GI symptoms.    7.  CAD: resume home ASA/plavix.  i'll hold his htn meds pending stable pressures overnight.  Restart as able.  Update: will restart home coreg, will need to readdress hydralazine/ARB/imdur etc pending blood pressure response.    8.  DM2 on insulin: resume his home insulin regimen at reduced doses in the morning given poor oral intake.  Adjust as indicated.    DVT Prophylaxis: Pneumatic Compression Devices  Code Status: Full Code  Discharge Dispo: admit to inpatient status      History of Present Illness:  Donald Raza is a 66 year old male with PMH including DM2, HIV on ART, NSTEMI, CKD on HD, prostate and colon cancer who presents with nausea, vomiting and diarrhea.  He was just discharged three days ago on 1/22/17 after a prolonged admission (nearly three weeks) for respiratory failure felt due to HCAP.  He was apparently critically ill and intubated for part of that stay.  He did go to HD yesterday but had to stop about 15 minutes early due to feeling generally unwell.  Around dinner time he felt more ill and apparently multiple family members in the home have had symptoms they attributed to 'flu' though it seems these are primarily GI symptoms in nature and he had more severe nausea, vomiting and diarrhea than the rest.  He has had some left cheek swelling felt due to sialoadenitis and he was recommended to use lemon drops to help with this.    Here in the ER he was tachycardic  and intermittently did have some lower pressures in the 90s systolic.  CXR showed possible atelectasis vs infiltrate at the left lung base.  Sodium was low at 123.  Cr was 7.34 and BUN was 53.      He was given a 250 cc fluid bolus, tylenol and a UA has been ordered.       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, recurrent     Hypertension 2010     CKD (chronic kidney disease)      Hemodialysis     Mixed hyperlipidemia      Coronary artery disease      stents     NSTEMI (non-ST elevated myocardial infarction) (H) 12/2015, 5/2016     Pulmonary HTN (H)      Mod     Near syncope 2016     with hemodialysis     TIA (transient ischemic attack) 5/2016     Increased prostate specific antigen (PSA) velocity 08/08/2016     Awaiting bx on blood thinner     Bilateral pneumonia 1/7/2017     Past Surgical History:  Past Surgical History   Procedure Laterality Date     Cholecystectomy, laporoscopic       Colostomy  09/30/1999     Temporary for diverticulitis     Appendectomy  2000     Angiogram  03-04-16     No culprit lesions, stents widely patent      Angiogram  05-06-16     Cutting balloon ptca=Diag     Stent, coronary, s660 15/18  12/2015     VANITA=Diag, PTCA=LAD     Stent, coronary, s660 15/18  06/2015     VANITA=LAD     Heart cath, angioplasty  08-18-16     LAD PCI. Stented with a 3.0 x 8 mm Xience Alpine stent.     Social History:  Social History   Substance Use Topics     Smoking status: Former Smoker     Types: Cigarettes     Smokeless tobacco: Not on file     Alcohol Use: No     Social History     Social History Narrative     Family History:  Family History   Problem Relation Age of Onset     HEART DISEASE Brother 40     CABG     KIDNEY DISEASE Sister      Hypertension Sister      HEART DISEASE  Brother      Dilated aorta     Allergies:  Allergies   Allergen Reactions     Lisinopril      Sulfa Drugs      Medications:  Prior to Admission Medications   Prescriptions Last Dose Informant Patient Reported? Taking?   ASPIRIN PO  Self Yes No   Sig: Take 81 mg by mouth every evening    CLONAZEPAM PO  Self Yes No   Sig: Take 0.5 mg by mouth nightly as needed for anxiety (restless legs)   CLOPIDOGREL BISULFATE PO  Self Yes No   Sig: Take 75 mg by mouth every evening    DOXERCALCIFEROL IV   Yes No   Sig: Inject 6 mcg into the vein three times a week   Darunavir Ethanolate (PREZISTA PO)  Self Yes No   Sig: Take 800 mg by mouth At Bedtime.   MAGNESIUM OXIDE PO  Self Yes No   Sig: Take 400 mg by mouth At Bedtime   Multiple Vitamins-Minerals (DIALYVITE 800/ULTRA D) TABS   Yes No   Sig: Take 1 tablet by mouth daily   Omega-3 Fatty Acids (OMEGA-3 FISH OIL PO)   Yes No   Sig: Take 2 g by mouth 2 times daily (with meals)   abacavir (ZIAGEN) 300 MG tablet  Self Yes No   Sig: Take 600 mg by mouth every evening    amLODIPine (NORVASC) 5 MG tablet   No No   Sig: Take 1 tablet (5 mg) by mouth At Bedtime   atorvastatin (LIPITOR) 40 MG tablet  Self Yes No   Sig: Take 40 mg by mouth At Bedtime   calcium acetate (PHOSLO) 667 MG CAPS  Self Yes No   Si,001 mg 3 times daily (with meals) Take 3 capsules three times a day with meals   carvedilol (COREG) 12.5 MG tablet   No No   Sig: Take 1 tablet (12.5 mg) by mouth 2 times daily (with meals)   chlorhexidine (CHLORHEXIDINE) 0.12 % solution  Self Yes No   Sig: Rinse and gargle 15 ml by mouth twice a day as directed.   dapsone 100 MG tablet  Self Yes No   Sig: Take 100 mg by mouth At Bedtime    dolutegravir (TIVICAY) 50 MG tablet  Self Yes No   Sig: Take 50 mg by mouth At Bedtime   epoetin brittni (EPOGEN,PROCRIT) 63962 UNIT/ML injection  Self Yes No   Sig: Inject 11,000 Units Subcutaneous three times a week WITH DIALYSIS   gabapentin (NEURONTIN) 300 MG capsule  Self Yes No   Sig: Take 300  "mg by mouth every morning    gabapentin (NEURONTIN) 300 MG capsule   Yes No   Sig: Take 600 mg by mouth every evening   hydrALAZINE (APRESOLINE) 50 MG tablet   No No   Sig: Take 0.5 tablets (25 mg) by mouth 3 times daily   hydrocortisone (ANUCORT-HC) 25 MG suppository  Self Yes No   Sig: Place 25 mg rectally 2 times daily as needed    imiquimod (ALDARA) 5 % cream   Yes No   Sig: Apply topically as needed   insulin glargine (LANTUS) 100 UNIT/ML PEN   Yes No   Sig: Inject 50 Units Subcutaneous every morning   insulin lispro (HUMALOG KWIKPEN) 100 UNIT/ML injection   Yes No   Sig: Inject Subcutaneous 3 times daily (before meals) Sliding scale - 2 units per 50 over 150   insulin lispro (HUMALOG KWIKPEN) 100 UNIT/ML soln   Yes No   Sig: Inject 15 Units Subcutaneous 3 times daily (before meals)   iron sucrose (VENOFER) 20 MG/ML injection  Self Yes No   Sig: Inject 50 mg into the vein once a week WIth dialysis   isosorbide mononitrate (IMDUR) 30 MG 24 hr tablet   No No   Sig: Take 2 tablets (60 mg) by mouth every evening   losartan (COZAAR) 100 MG tablet   No No   Sig: Take 1 tablet (100 mg) by mouth At Bedtime   mycophenolate (CELLCEPT - GENERIC EQUIVALENT) 500 MG tablet   Yes No   Sig: Take 500 mg by mouth 2 times daily On an empty stomach   nitroglycerin (NITROSTAT) 0.4 MG SL tablet  Self No No   Sig: One tablet under the tongue every 5 minutes if needed for chest pain. May repeat every 5 minutes for a maximum of 3 doses in 15 minutes\"   nystatin (MYCOSTATIN) cream   Yes No   Sig: Apply topically daily as needed for dry skin   order for DME   No No   Sig: Equipment being ordered: Other: 4WW  Treatment Diagnosis: Decreased activity tolerance   ritonavir (NORVIR) 100 MG capsule  Self Yes No   Sig: Take 1 capsule by mouth At Bedtime    terbinafine (LAMISIL AT) 1 % cream  Self Yes No   Sig: Apply topically 2 times daily as needed       Facility-Administered Medications: None       Review of Systems:  A Comprehensive greater " than 10 system review of systems was carried out.  Pertinent positives and negatives are noted above.  Otherwise negative for contributory information.     Physical Exam:  Blood pressure 104/54, pulse 96, temperature 103  F (39.4  C), temperature source Oral, resp. rate 20, SpO2 94 %.  Wt Readings from Last 1 Encounters:   01/22/17 82.6 kg (182 lb 1.6 oz)     Exam:  General: Alert, awake, no acute distress.    HEENT: NC/AT, eyes anicteric, external occular movements intact, left parotid fullness noted.  Dentition WNL, MM clearly dry.  Cardiac: RRR, S1, S2.  No murmurs appreciated.  Pulmonary: Normal chest rise, normal work of breathing.  Lungs CTA BL  Abdomen: soft, non-tender, non-distended.  Bowel Sounds Present.  No guarding.  Extremities: left arm AVF noted, seems in good repair w/ bruit.  no deformities.  Warm, well perfused.  Skin: no rashes or lesions noted.  Warm and Dry.  Neuro: No focal deficits noted.  Speech clear.  Coordination and strength grossly normal.  Psych: Appropriate affect.    Data:  EKG:  None.  Imaging:  Results for orders placed or performed during the hospital encounter of 01/25/17   Chest  XR, 1 view PORTABLE    Narrative    XR CHEST PORT 1 VW 1/25/2017 3:19 PM    HISTORY: fever      Impression    IMPRESSION: Shallow inspiration with some atelectasis or infiltrate  left lung base.    VESNA LEDESMA MD     Labs:    Recent Labs  Lab 01/25/17  1242 01/22/17  0645 01/20/17  0510 01/19/17  0541   WBC 7.4 5.3  --  6.2   HGB 10.2* 8.0* 8.5* 8.0*   HCT 32.7* 26.0*  --  25.3*   MCV 94 93  --  91    222  --  206        NA      123   1/25/2017  NA      138   1/21/2017  NA      137   1/19/2017 CHLORIDE       87   1/25/2017  CHLORIDE       97   1/21/2017  CHLORIDE       96   1/19/2017 BUN       53   1/25/2017  BUN       41   1/21/2017  BUN       47   1/19/2017   POTASSIUM      5.0   1/25/2017  POTASSIUM      4.7   1/22/2017  POTASSIUM      4.1   1/21/2017 CO2       26   1/25/2017  CO2       31    1/21/2017  CO2       30   1/19/2017 CR     7.34   1/25/2017  CR     5.37   1/21/2017  CR     5.37   1/19/2017     No results for input(s): INR in the last 168 hours.  No results for input(s): LACT in the last 168 hours.      Chucky Bang MD  Hospitalist  [facility]

## 2017-01-26 LAB
ANION GAP SERPL CALCULATED.3IONS-SCNC: 10 MMOL/L (ref 3–14)
BUN SERPL-MCNC: 64 MG/DL (ref 7–30)
C DIFF TOX B STL QL: NORMAL
CALCIUM SERPL-MCNC: 9 MG/DL (ref 8.5–10.1)
CAMPYLOBACTER GROUP BY NAT: NOT DETECTED
CHLORIDE SERPL-SCNC: 102 MMOL/L (ref 94–109)
CO2 SERPL-SCNC: 24 MMOL/L (ref 20–32)
CREAT SERPL-MCNC: 9.71 MG/DL (ref 0.66–1.25)
ENTERIC PATHOGEN COMMENT: ABNORMAL
ERYTHROCYTE [DISTWIDTH] IN BLOOD BY AUTOMATED COUNT: 16.3 % (ref 10–15)
GFR SERPL CREATININE-BSD FRML MDRD: 5 ML/MIN/1.7M2
GLUCOSE BLDC GLUCOMTR-MCNC: 126 MG/DL (ref 70–99)
GLUCOSE BLDC GLUCOMTR-MCNC: 135 MG/DL (ref 70–99)
GLUCOSE BLDC GLUCOMTR-MCNC: 157 MG/DL (ref 70–99)
GLUCOSE BLDC GLUCOMTR-MCNC: 159 MG/DL (ref 70–99)
GLUCOSE BLDC GLUCOMTR-MCNC: 219 MG/DL (ref 70–99)
GLUCOSE SERPL-MCNC: 172 MG/DL (ref 70–99)
HCT VFR BLD AUTO: 29 % (ref 40–53)
HGB BLD-MCNC: 9.1 G/DL (ref 13.3–17.7)
MCH RBC QN AUTO: 29.4 PG (ref 26.5–33)
MCHC RBC AUTO-ENTMCNC: 31.4 G/DL (ref 31.5–36.5)
MCV RBC AUTO: 94 FL (ref 78–100)
NOROVIRUS I AND II BY NAT: ABNORMAL
PLATELET # BLD AUTO: 156 10E9/L (ref 150–450)
POTASSIUM SERPL-SCNC: 6.2 MMOL/L (ref 3.4–5.3)
RBC # BLD AUTO: 3.1 10E12/L (ref 4.4–5.9)
ROTAVIRUS A BY NAT: NOT DETECTED
SALMONELLA SPECIES BY NAT: NOT DETECTED
SHIGA TOXIN 1 GENE BY NAT: NOT DETECTED
SHIGA TOXIN 2 GENE BY NAT: NOT DETECTED
SHIGELLA SP+EIEC IPAH STL QL NAA+PROBE: NOT DETECTED
SODIUM SERPL-SCNC: 136 MMOL/L (ref 133–144)
SPECIMEN SOURCE: NORMAL
VIBRIO GROUP BY NAT: NOT DETECTED
WBC # BLD AUTO: 4 10E9/L (ref 4–11)
YERSINIA ENTEROCOLITICA BY NAT: NOT DETECTED

## 2017-01-26 PROCEDURE — 85027 COMPLETE CBC AUTOMATED: CPT | Performed by: INTERNAL MEDICINE

## 2017-01-26 PROCEDURE — 25000128 H RX IP 250 OP 636: Performed by: INTERNAL MEDICINE

## 2017-01-26 PROCEDURE — 25000131 ZZH RX MED GY IP 250 OP 636 PS 637: Mod: GY | Performed by: INTERNAL MEDICINE

## 2017-01-26 PROCEDURE — 90937 HEMODIALYSIS REPEATED EVAL: CPT

## 2017-01-26 PROCEDURE — 25000132 ZZH RX MED GY IP 250 OP 250 PS 637: Mod: GY | Performed by: INTERNAL MEDICINE

## 2017-01-26 PROCEDURE — A9270 NON-COVERED ITEM OR SERVICE: HCPCS | Mod: GY | Performed by: INTERNAL MEDICINE

## 2017-01-26 PROCEDURE — 63400005 ZZH RX 634: Performed by: INTERNAL MEDICINE

## 2017-01-26 PROCEDURE — 12000007 ZZH R&B INTERMEDIATE

## 2017-01-26 PROCEDURE — 99233 SBSQ HOSP IP/OBS HIGH 50: CPT | Performed by: INTERNAL MEDICINE

## 2017-01-26 PROCEDURE — 00000146 ZZHCL STATISTIC GLUCOSE BY METER IP

## 2017-01-26 PROCEDURE — 36415 COLL VENOUS BLD VENIPUNCTURE: CPT | Performed by: INTERNAL MEDICINE

## 2017-01-26 PROCEDURE — 80048 BASIC METABOLIC PNL TOTAL CA: CPT | Performed by: INTERNAL MEDICINE

## 2017-01-26 PROCEDURE — 5A1D60Z PERFORMANCE OF URINARY FILTRATION, MULTIPLE: ICD-10-PCS | Performed by: INTERNAL MEDICINE

## 2017-01-26 RX ORDER — ALBUMIN (HUMAN) 12.5 G/50ML
50 SOLUTION INTRAVENOUS
Status: DISCONTINUED | OUTPATIENT
Start: 2017-01-26 | End: 2017-01-26

## 2017-01-26 RX ORDER — ALBUMIN, HUMAN INJ 5% 5 %
250 SOLUTION INTRAVENOUS
Status: DISCONTINUED | OUTPATIENT
Start: 2017-01-26 | End: 2017-01-26

## 2017-01-26 RX ADMIN — ABACAVIR 600 MG: 300 TABLET, FILM COATED ORAL at 20:11

## 2017-01-26 RX ADMIN — RITONAVIR 100 MG: 100 TABLET, FILM COATED ORAL at 22:12

## 2017-01-26 RX ADMIN — ASPIRIN 81 MG 81 MG: 81 TABLET ORAL at 20:12

## 2017-01-26 RX ADMIN — SODIUM CHLORIDE 250 ML: 9 INJECTION, SOLUTION INTRAVENOUS at 15:00

## 2017-01-26 RX ADMIN — GABAPENTIN 300 MG: 300 CAPSULE ORAL at 09:24

## 2017-01-26 RX ADMIN — CARVEDILOL 12.5 MG: 12.5 TABLET, FILM COATED ORAL at 09:24

## 2017-01-26 RX ADMIN — MYCOPHENOLATE MOFETIL 500 MG: 250 CAPSULE ORAL at 09:24

## 2017-01-26 RX ADMIN — INSULIN GLARGINE 30 UNITS: 100 INJECTION, SOLUTION SUBCUTANEOUS at 09:23

## 2017-01-26 RX ADMIN — CLOPIDOGREL 75 MG: 75 TABLET, FILM COATED ORAL at 20:12

## 2017-01-26 RX ADMIN — CALCIUM ACETATE 2001 MG: 667 CAPSULE ORAL at 19:07

## 2017-01-26 RX ADMIN — INSULIN ASPART 5 UNITS: 100 INJECTION, SOLUTION INTRAVENOUS; SUBCUTANEOUS at 20:10

## 2017-01-26 RX ADMIN — ERYTHROPOIETIN 10000 UNITS: 10000 INJECTION, SOLUTION INTRAVENOUS; SUBCUTANEOUS at 17:38

## 2017-01-26 RX ADMIN — DAPSONE 100 MG: 25 TABLET ORAL at 22:13

## 2017-01-26 RX ADMIN — RANITIDINE HYDROCHLORIDE 150 MG: 150 TABLET, FILM COATED ORAL at 22:13

## 2017-01-26 RX ADMIN — CARVEDILOL 12.5 MG: 12.5 TABLET, FILM COATED ORAL at 20:11

## 2017-01-26 RX ADMIN — CALCIUM ACETATE 2001 MG: 667 CAPSULE ORAL at 13:38

## 2017-01-26 RX ADMIN — DOLUTEGRAVIR SODIUM 50 MG: 50 TABLET, FILM COATED ORAL at 22:12

## 2017-01-26 RX ADMIN — MYCOPHENOLATE MOFETIL 500 MG: 250 CAPSULE ORAL at 22:12

## 2017-01-26 RX ADMIN — GABAPENTIN 600 MG: 300 CAPSULE ORAL at 20:12

## 2017-01-26 RX ADMIN — DARUNAVIR 800 MG: 800 TABLET, FILM COATED ORAL at 22:12

## 2017-01-26 RX ADMIN — Medication 1 CAPSULE: at 09:24

## 2017-01-26 RX ADMIN — CALCIUM ACETATE 2001 MG: 667 CAPSULE ORAL at 09:23

## 2017-01-26 RX ADMIN — RANITIDINE HYDROCHLORIDE 150 MG: 150 TABLET, FILM COATED ORAL at 09:24

## 2017-01-26 NOTE — PROGRESS NOTES
[facility]    Hospitalist Progress Note  Name: Donald Raza    MRN: [id number]  Provider: Sherice Bernardo MD  Date of Service: 01/26/2017    Assessment and Plan  Summary of Stay: Donald Raza is a 66 year old male who was admitted on 1/25/2017. PAtient with PMH including DM2, HIV on ART, NSTEMI, CKD on HD, prostate and colon cancer presented with nausea, vomiting and diarrhea three days after discharging from a ~20 day admission for HCAP re admitted for  likely viral gastroenteritis     Nausea, vomiting and diarrhea with hyponatremia:   -- Likely viral gastroenteritis based on numerous sick contacts at home w/ self limited diseases.   -checked c. Dif PCR  NEGATIVE  --Enteric bacterial and virus panel Showed positive TERESA virus  -stool stool cultures given HIV status pending  -NS bolus 500 cc, recheck sodium  -enteric precautions  -supportive cares with IV anti-emetics etc    Abnormal CXR:  -- likely residual from his prior HCAP last admission.   --Recent hospitalization for HCAP from 1/3 to 1/22/17: completed abx course.        Hx of HIV on ART: resume usual anti-retrovirals once doses are verified.    --No hx of AIDS defining illness per report though his CD4 was 141 in March of 2016 which may suggest a diagnosis of AIDS may be reasonable.  In any case, per Dr. Wooten note from earlier this week he follows in [facility] and his most recent CD4 was 263 about three weeks prior and HIV virus was not detectable.   -- consult ID      ESRD on HD: continue HD as per nephrology (consulted).  No acute indication right now.  -- hyperkalemia will be dialyzed today. Nephrology following    Hyponatremia: recheck.  Given small fluid bolus which should help.  Likely low due to severe GI symptoms.    CAD:   -- resumed asa, plavix and betablocker  --- held  hydralazine/ARB/imdur  On admission for  blood pressure response.    DM2 on insulin:   --resume his home insulin regimen at reduced doses  --will  Adjust as  indicated.    DVT Prophylaxis: Pneumatic Compression Devices  Code Status: Full Code    Disposition: Expected discharge in 2-3 days to home if stable      Interval History  Patient is lethargic, lack of energy. No pain, no fever or chills. Generalized malaise. Review of all other systems negative    -Data reviewed today: I reviewed all new labs and imaging reports over the last 24 hours. I personally reviewed no images or EKG's today.    Physical Exam  Temp: 97.9  F (36.6  C) Temp src: Oral BP: 106/70 mmHg Pulse: 94 Heart Rate: 98 Resp: 20 SpO2: 97 % O2 Device: Nasal cannula Oxygen Delivery: 1 LPM  Filed Vitals:    01/26/17 0654   Weight: 75.116 kg (165 lb 9.6 oz)     Vital Signs with Ranges  Temp:  [97.9  F (36.6  C)-103  F (39.4  C)] 97.9  F (36.6  C)  Pulse:  [85-96] 94  Heart Rate:  [] 98  Resp:  [18-24] 20  BP: ()/(54-77) 106/70 mmHg  SpO2:  [91 %-100 %] 97 %         General: Alert, awake, no acute distress.     HEENT: NC/AT, eyes anicteric, external occular movements intact, left parotid fullness noted.  Dentition WNL, MM clearly dry.  Cardiac: RRR, S1, S2.  No murmurs appreciated.  Pulmonary: Normal chest rise, normal work of breathing.  Lungs CTA BL  Abdomen: soft, non-tender, non-distended.  Bowel Sounds Present.  No guarding.  Extremities: left arm AVF noted, seems in good repair w/ bruit.  no deformities.  Warm, well perfused.  Skin: no rashes or lesions noted.  Warm and Dry.  Neuro: No focal deficits noted.  Speech clear.  Coordination and strength grossly normal.  Psych: Appropriate affect.    Medications       sodium chloride (PF)  3 mL Intracatheter Q8H     ranitidine  150 mg Oral BID     aspirin chewable tablet 81 mg  81 mg Oral QPM     calcium acetate  2,001 mg Oral TID w/meals     clopidogrel (PLAVIX) tablet 75 mg  75 mg Oral QPM     gabapentin  300 mg Oral QAM     gabapentin  600 mg Oral QPM     insulin glargine  30 Units Subcutaneous QAM     insulin aspart  5 Units Subcutaneous TID AC      B complex-C-folic acid  1 capsule Oral Daily     insulin aspart  1-10 Units Subcutaneous TID AC     insulin aspart  1-7 Units Subcutaneous At Bedtime     abacavir  600 mg Oral QPM     dapsone  100 mg Oral At Bedtime     darunavir  800 mg Oral At Bedtime     dolutegravir  50 mg Oral At Bedtime     mycophenolate  500 mg Oral BID     carvedilol  12.5 mg Oral BID w/meals     ritonavir  100 mg Oral At Bedtime     - MEDICATION INSTRUCTIONS for Dialysis Patients -   Does not apply See Admin Instructions     Data      Recent Labs  Lab 01/25/17  1242 01/22/17  0645 01/20/17  0510   WBC 7.4 5.3  --    HGB 10.2* 8.0* 8.5*   HCT 32.7* 26.0*  --    MCV 94 93  --     222  --        Recent Labs  Lab 01/25/17  2200 01/25/17  1242 01/22/17  0645 01/21/17  0558    123*  --  138   POTASSIUM  --  5.0 4.7 4.1   CHLORIDE  --  87*  --  97   CO2  --  26  --  31   ANIONGAP  --  10  --  10   GLC  --  151*  --  127*   BUN  --  53*  --  41*   CR  --  7.34*  --  5.37*   GFRESTIMATED  --  7*  --  11*   GFRESTBLACK  --  9*  --  13*   PRABHA  --  9.3  --  9.3     No results for input(s): CULT in the last 168 hours.  No results for input(s): AST, ALT, GGT, ALKPHOS, BILITOTAL, BILICONJ, BILIDIRECT, ISI in the last 168 hours.    Invalid input(s): BILIRUBININDIRECT  No results for input(s): INR in the last 168 hours.    Recent Labs  Lab 01/25/17  2305   LACT 0.9     No results for input(s): TROPONIN, TROPI, TROPR in the last 168 hours.    Invalid input(s): TROP, TROPONINIES  No results for input(s): COLOR, APPEARANCE, URINEGLC, URINEBILI, URINEKETONE, SG, UBLD, URINEPH, PROTEIN, UROBILINOGEN, NITRITE, LEUKEST, RBCU, WBCU in the last 168 hours.    Recent Results (from the past 24 hour(s))   Chest  XR, 1 view PORTABLE    Narrative    XR CHEST PORT 1 VW 1/25/2017 3:19 PM    HISTORY: fever      Impression    IMPRESSION: Shallow inspiration with some atelectasis or infiltrate  left lung base.    VESNA LEDESMA MD

## 2017-01-26 NOTE — PROGRESS NOTES
Pt arrived from ER at 17:50. Pt a/o x4, did not have him stand to weak. Pt has had two loose stools, yellow in color. Need to send stool sample. Pt VSS , LS diminished. Family reports they have had N/V/D symptoms for the last several days. Cont with POC

## 2017-01-26 NOTE — PROGRESS NOTES
"DIALYSIS PROCEDURE NOTE    Safety checks complete, air detector on, venous and arterial parameters set prior to treatment, and consent verified.    Donald Raza was dialyzed for 3.5 hrs via L AVF using a K1 bath for 1hr45\", then K2 for remainder.    A BFR of 450 ml/min with a net volume removal of 1000 mL.     Dialysed in room 307 r/t isolation reqs. BVP 88L. Water alarm in use. Portable RO used. Pre-run report received from Radha Mayfield RN.    Heparin given on run: 0 units.  Kt/V by machine: 1.38  Medications given: epogen IV 10,000 units    Treatment notes: A&O. VSS. Resting in bed during run. No c/o. AVF patent. 15 ga needles. Stasis achieved in AVF at end, dsg applied.     Pt seen by Dr. Bipin Chaves during treatment.   Pt received education on procedure and ESRD while on dialysis. VSS at end of tx.   Report given to Meme Barnes RN.  .  Post run pt assessment charted in EPIC on [facility] flow sheet.    SHAJI Carl, RN  [facility]  [facility]  "

## 2017-01-26 NOTE — PROVIDER NOTIFICATION
"Lab called with critical K level.    Paged Dr. Bernardo at [phone] \"K 6.2, has not yet had dialysis. Thanks!\"    Spoke with Dr. Bernardo, received orders for Calcium gluconate 1 gram IV once and recheck K at dialysis today.     Discussed with dialysis nurse who consulted with Dr. Chaves Paged Dr. Bernardo at [phone] \"FYI pt now getting prepped for dialysis, calcium gluconate not yet up from pharmacy. Per Dr. Chaves, hold calcium gluconate IV. Thanks!\"    "

## 2017-01-26 NOTE — PLAN OF CARE
Problem: Goal Outcome Summary  Goal: Goal Outcome Summary  Outcome: No Change  Pt A&Ox4, assist of 1 with mobility in bed this shift. Incontinent of stool x 5 this shift. IV is saline locked in the right arm. Fistula to L arm, +bruit +thrill.  and 157, held Novolog due to poor PO intake. On Tele, SR with bigeminal PAC's and peaked T waves, MD aware. K 6.2, plan to recheck with dialysis this afternoon, dialysis running in room at this time. Dtr visiting most of shift.

## 2017-01-26 NOTE — PLAN OF CARE
Problem: Goal Outcome Summary  Goal: Goal Outcome Summary  Outcome: No Change  VSS and WNL for this pt. Pt transfers with assist of 2 with lift, LS coarse and tele is SR. Pt is A&O x2, denies pain and BG was 159. POC reviewed with pt at bedside and safety checks complete. Pt slept comfortably part of night, will pass on and continue to monitor.

## 2017-01-26 NOTE — PLAN OF CARE
Problem: Diarrhea (Adult)  Goal: Improved/Reduced Symptoms  Patient will demonstrate the desired outcomes by discharge/transition of care.   Patient unable to take meds due to nausea.  MD notified, pt received bolus NS fluids earlier.  Given Zofran x 1.

## 2017-01-26 NOTE — PROGRESS NOTES
Infection Prevention:    Patient requires Enteric Precautions because of Norovirus. Please contact Infection Prevention with any questions/concerns at [phone].    Margaret De Jesus, ICP

## 2017-01-26 NOTE — CONSULTS
[facility]    RENAL CONSULTATION NOTE    REFERRING MD:  Dr. Bang    REASON FOR CONSULTATION:  ESKD, n/v/d    DATE OF CONSULTATION: 01/26/2017    SHORTHAND KEY FOR MY NOTES:  c = with, s = without, p = after, a = before, x = except, asx = asymptomatic, tx = transplant or treatment, sx = symptoms or symptomatic, cx = canceled or culture, rxn = reaction, yday = yesterday, nl = normal, abx = antibiotics, fxn = function, dx = diagnosis, dz = disease, m/h = melena/hematochezia, c/d/l/ha = cramping/dizziness/lightheadedness/headache, d/c = discharge or diarrhea/constipation, f/c/n/v = fevers/chills/nausea/vomiting, cp/sob = chest pain/shortness of breath.    HPI: Donald Raza is a 66 year old male c ESKD 2 DM2 who dialyses TRS via a LAF at the [facility] under the care of Dr. Chavez ([facility]) and was admitted on 1/25/2017 c n/v/d felt to be 2 norovirus.     Pt was in the hospital for several wks c resp failure 2 pneumonia and was just dc'd on Sunday. Multiple family members have been sick c the same sx and his dtr states that his wife even fainted in the bathroom from the diarrhea.  Since the pt is immunocompromised, they tried to keep him in sterile areas.  He did ok, initially, but then developed n/v/d and was so weak EMS had to be activated to bring him in for further eval.      In the ER, his initial labs showed a Na of 123 and he was pretty dry.  He was given some IVF and his Na quickly returned to 136.  He does not have any ha/n/v/confusion/vision changes.  His Na this am was 136 as well.      Currently, he feels better.  He is still having diarrhea.  He had a fever yday, but not today.  He states he is breathing ok, but is still pretty weak and fatigued.  No cp/abd pain.    ROS:  A complete review of systems was performed and is negative x as noted above.    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, recurrent     Hypertension 2010     CKD (chronic kidney disease)      Hemodialysis     Mixed hyperlipidemia      Coronary artery disease      stents     NSTEMI (non-ST elevated myocardial infarction) (H) 12/2015, 5/2016     Pulmonary HTN (H)      Mod     Near syncope 2016     with hemodialysis     TIA (transient ischemic attack) 5/2016     Increased prostate specific antigen (PSA) velocity 08/08/2016     Awaiting bx on blood thinner     Bilateral pneumonia 1/7/2017     PSH:    Past Surgical History   Procedure Laterality Date     Cholecystectomy, laporoscopic       Colostomy  09/30/1999     Temporary for diverticulitis     Appendectomy  2000     Angiogram  03-04-16     No culprit lesions, stents widely patent      Angiogram  05-06-16     Cutting balloon ptca=Diag     Stent, coronary, s660 15/18  12/2015     VANITA=Diag, PTCA=LAD     Stent, coronary, s660 15/18  06/2015     VANITA=LAD     Heart cath, angioplasty  08-18-16     LAD PCI. Stented with a 3.0 x 8 mm Xience Alpine stent.     MEDICATIONS:      sodium chloride 0.9%  250-1,000 mL Intravenous Once in dialysis     epoetin brittni (EPOGEN,PROCRIT) inj ESRD  10,000 Units Intravenous See Admin Instructions     sodium chloride (PF)  3 mL Intracatheter Q8H     ranitidine  150 mg Oral BID     aspirin chewable tablet 81 mg  81 mg Oral QPM     calcium acetate  2,001 mg Oral TID w/meals     clopidogrel (PLAVIX) tablet 75 mg  75 mg Oral QPM     gabapentin  300 mg Oral QAM     gabapentin  600 mg Oral QPM     insulin glargine  30 Units Subcutaneous QAM     insulin aspart  5 Units Subcutaneous TID AC     B complex-C-folic acid  1 capsule Oral Daily     insulin aspart  1-10 Units Subcutaneous TID AC     insulin aspart  1-7 Units Subcutaneous At Bedtime     abacavir  600 mg Oral QPM     dapsone  100 mg Oral At Bedtime      darunavir  800 mg Oral At Bedtime     dolutegravir  50 mg Oral At Bedtime     mycophenolate  500 mg Oral BID     carvedilol  12.5 mg Oral BID w/meals     ritonavir  100 mg Oral At Bedtime     - MEDICATION INSTRUCTIONS for Dialysis Patients -   Does not apply See Admin Instructions     ALLERGIES:    Allergies as of 01/25/2017 - Review Complete 01/25/2017   Allergen Reaction Noted     Lisinopril  07/23/2012     Sulfa drugs  07/23/2012     FH:    Family History   Problem Relation Age of Onset     HEART DISEASE Brother 40     CABG     KIDNEY DISEASE Sister      Hypertension Sister      HEART DISEASE Brother      Dilated aorta     SH:    Social History     Social History     Marital Status:      Spouse Name: N/A     Number of Children: N/A     Years of Education: N/A     Occupational History     Not on file.     Social History Main Topics     Smoking status: Former Smoker     Types: Cigarettes     Smokeless tobacco: Not on file     Alcohol Use: No     Drug Use: No     Sexual Activity: Not on file     Other Topics Concern     Parent/Sibling W/ Cabg, Mi Or Angioplasty Before 65f 55m? Yes     Caffeine Concern Yes     2 cups daily     Sleep Concern Yes     Special Diet No      more proteins     Exercise Yes     Cardiac rehab      Social History Narrative     PHYSICAL EXAM:    BP 96/60 mmHg  Pulse 81  Temp(Src) 98.1  F (36.7  C) (Oral)  Resp 18  Wt 75.116 kg (165 lb 9.6 oz)  SpO2 96%    GENERAL: awake, alert, NAD  HEENT:  Normocephalic. No gross abnormalities.  Mouth dry  Dentition is ok.  Pupils equal.  EOMI.  No scleral icterus.  CV: RRR c 2/6 murmurs, no clicks, gallops, or rubs, no ble edema.  RESP: Clear bilaterally with good efforts, but diminished; + nc o2  GI: Abdomen o/s/nt/nd, BS present. No masses, organomegaly  MUSCULOSKELETAL: extremities nl - no gross deformities noted  SKIN: no suspicious lesions or rashes, dry to touch  NEURO:  Strength normal and symmetric.   PSYCH: mood good, affect  appropriate  LYMPH: No palpable ant/post cervical and supraclavicular adenopathy  OTHER:  Access -     Pt seen on HD.  Stable run expected c minimal UF.  Good BFR via LUAF.  K1 and K2 baths used.    LABS:      CBC RESULTS:     Recent Labs  Lab 01/26/17  1200 01/25/17  1242 01/22/17  0645 01/20/17  0510   WBC 4.0 7.4 5.3  --    RBC 3.10* 3.48* 2.79*  --    HGB 9.1* 10.2* 8.0* 8.5*   HCT 29.0* 32.7* 26.0*  --     213 222  --      BMP RESULTS:    Recent Labs  Lab 01/26/17  1200 01/25/17  2200 01/25/17  1242 01/22/17  0645 01/21/17  0558    136 123*  --  138   POTASSIUM 6.2*  --  5.0 4.7 4.1   CHLORIDE 102  --  87*  --  97   CO2 24  --  26  --  31   BUN 64*  --  53*  --  41*   CR 9.71*  --  7.34*  --  5.37*   *  --  151*  --  127*   PRABHA 9.0  --  9.3  --  9.3     INRNo lab results found in last 7 days.     DIAGNOSTICS:  Personally reviewed CXR - no pulm edema, cardiomegaly    A/P:  Donald Raza is a 66 year old male c ESKD who has hyperkalemia and diarrhea/weakness 2 norovirus.    1.  ESKD c hyperkalemia.  Pt is due to run today.  Despite the diarrhea, he still has a high k.    A.  HD today c k1/k2 baths.  B.  Check k in am.    2.  Diarrhea 2 norovirus.  He continues to have diarrhea.  A.  Supportive measures.  B.  Judicious use of fluid given that he is a dialysis pt.    3.  Weakness.  This is 2 to #2.  He was also pretty weak p the long hospitalization, during which he was intubated.  A.  PT/OT.    4.  Anemia.  Hb is stable. Yesterday's was hemoconcentrated.  A.  Follow hb.  B.  EPO c HD.    5.  FEN.  Pt is on a dialysis diet.  A.  Continue same diet.    Thank you for this consultation. We will follow c you.  Please call if any questions.      Attestation:   I have reviewed today's relevant vital signs, notes, medications, labs and imaging.    Bipin Chaves MD  [facility] - Nephrology  [phone]

## 2017-01-26 NOTE — PROGRESS NOTES
Patient with large loose stool. C Diff culture sent.  Patient to nauseated to take 8p meds.  Gave Zofran IV, will attempt to offer meds in 30-40- minutes.

## 2017-01-27 ENCOUNTER — APPOINTMENT (OUTPATIENT)
Dept: PHYSICAL THERAPY | Facility: CLINIC | Age: 67
DRG: 391 | End: 2017-01-27
Attending: INTERNAL MEDICINE
Payer: MEDICARE

## 2017-01-27 LAB
ANION GAP SERPL CALCULATED.3IONS-SCNC: 11 MMOL/L (ref 3–14)
BUN SERPL-MCNC: 37 MG/DL (ref 7–30)
CALCIUM SERPL-MCNC: 8.7 MG/DL (ref 8.5–10.1)
CHLORIDE SERPL-SCNC: 95 MMOL/L (ref 94–109)
CO2 SERPL-SCNC: 29 MMOL/L (ref 20–32)
CREAT SERPL-MCNC: 5.94 MG/DL (ref 0.66–1.25)
ERYTHROCYTE [DISTWIDTH] IN BLOOD BY AUTOMATED COUNT: 15.9 % (ref 10–15)
GFR SERPL CREATININE-BSD FRML MDRD: 9 ML/MIN/1.7M2
GLUCOSE BLDC GLUCOMTR-MCNC: 128 MG/DL (ref 70–99)
GLUCOSE BLDC GLUCOMTR-MCNC: 143 MG/DL (ref 70–99)
GLUCOSE BLDC GLUCOMTR-MCNC: 156 MG/DL (ref 70–99)
GLUCOSE BLDC GLUCOMTR-MCNC: 180 MG/DL (ref 70–99)
GLUCOSE BLDC GLUCOMTR-MCNC: 186 MG/DL (ref 70–99)
GLUCOSE BLDC GLUCOMTR-MCNC: 191 MG/DL (ref 70–99)
GLUCOSE SERPL-MCNC: 107 MG/DL (ref 70–99)
HCT VFR BLD AUTO: 28.4 % (ref 40–53)
HGB BLD-MCNC: 8.8 G/DL (ref 13.3–17.7)
MCH RBC QN AUTO: 29.1 PG (ref 26.5–33)
MCHC RBC AUTO-ENTMCNC: 31 G/DL (ref 31.5–36.5)
MCV RBC AUTO: 94 FL (ref 78–100)
PLATELET # BLD AUTO: 159 10E9/L (ref 150–450)
POTASSIUM SERPL-SCNC: 3.7 MMOL/L (ref 3.4–5.3)
POTASSIUM SERPL-SCNC: 3.9 MMOL/L (ref 3.4–5.3)
RBC # BLD AUTO: 3.02 10E12/L (ref 4.4–5.9)
SODIUM SERPL-SCNC: 135 MMOL/L (ref 133–144)
WBC # BLD AUTO: 4.8 10E9/L (ref 4–11)

## 2017-01-27 PROCEDURE — 36415 COLL VENOUS BLD VENIPUNCTURE: CPT | Performed by: INTERNAL MEDICINE

## 2017-01-27 PROCEDURE — 12000007 ZZH R&B INTERMEDIATE

## 2017-01-27 PROCEDURE — 80048 BASIC METABOLIC PNL TOTAL CA: CPT | Performed by: INTERNAL MEDICINE

## 2017-01-27 PROCEDURE — 25000132 ZZH RX MED GY IP 250 OP 250 PS 637: Mod: GY | Performed by: INTERNAL MEDICINE

## 2017-01-27 PROCEDURE — 97161 PT EVAL LOW COMPLEX 20 MIN: CPT | Mod: GP | Performed by: PHYSICAL THERAPIST

## 2017-01-27 PROCEDURE — 40000193 ZZH STATISTIC PT WARD VISIT: Performed by: PHYSICAL THERAPIST

## 2017-01-27 PROCEDURE — A9270 NON-COVERED ITEM OR SERVICE: HCPCS | Mod: GY | Performed by: INTERNAL MEDICINE

## 2017-01-27 PROCEDURE — 25000131 ZZH RX MED GY IP 250 OP 636 PS 637: Mod: GY | Performed by: INTERNAL MEDICINE

## 2017-01-27 PROCEDURE — 99233 SBSQ HOSP IP/OBS HIGH 50: CPT | Performed by: INTERNAL MEDICINE

## 2017-01-27 PROCEDURE — 85027 COMPLETE CBC AUTOMATED: CPT | Performed by: INTERNAL MEDICINE

## 2017-01-27 PROCEDURE — 00000146 ZZHCL STATISTIC GLUCOSE BY METER IP

## 2017-01-27 PROCEDURE — 84132 ASSAY OF SERUM POTASSIUM: CPT | Performed by: INTERNAL MEDICINE

## 2017-01-27 RX ADMIN — RANITIDINE HYDROCHLORIDE 150 MG: 150 TABLET, FILM COATED ORAL at 08:55

## 2017-01-27 RX ADMIN — CARVEDILOL 12.5 MG: 12.5 TABLET, FILM COATED ORAL at 17:57

## 2017-01-27 RX ADMIN — MYCOPHENOLATE MOFETIL 500 MG: 250 CAPSULE ORAL at 08:55

## 2017-01-27 RX ADMIN — CARVEDILOL 12.5 MG: 12.5 TABLET, FILM COATED ORAL at 08:50

## 2017-01-27 RX ADMIN — INSULIN ASPART 5 UNITS: 100 INJECTION, SOLUTION INTRAVENOUS; SUBCUTANEOUS at 13:16

## 2017-01-27 RX ADMIN — INSULIN ASPART 5 UNITS: 100 INJECTION, SOLUTION INTRAVENOUS; SUBCUTANEOUS at 08:47

## 2017-01-27 RX ADMIN — GABAPENTIN 600 MG: 300 CAPSULE ORAL at 22:26

## 2017-01-27 RX ADMIN — INSULIN ASPART 5 UNITS: 100 INJECTION, SOLUTION INTRAVENOUS; SUBCUTANEOUS at 17:57

## 2017-01-27 RX ADMIN — DARUNAVIR 800 MG: 800 TABLET, FILM COATED ORAL at 22:26

## 2017-01-27 RX ADMIN — Medication 1 CAPSULE: at 08:44

## 2017-01-27 RX ADMIN — CALCIUM ACETATE 2001 MG: 667 CAPSULE ORAL at 08:43

## 2017-01-27 RX ADMIN — CALCIUM ACETATE 2001 MG: 667 CAPSULE ORAL at 13:13

## 2017-01-27 RX ADMIN — INSULIN GLARGINE 30 UNITS: 100 INJECTION, SOLUTION SUBCUTANEOUS at 08:48

## 2017-01-27 RX ADMIN — RITONAVIR 100 MG: 100 TABLET, FILM COATED ORAL at 22:26

## 2017-01-27 RX ADMIN — GABAPENTIN 300 MG: 300 CAPSULE ORAL at 08:44

## 2017-01-27 RX ADMIN — DOLUTEGRAVIR SODIUM 50 MG: 50 TABLET, FILM COATED ORAL at 22:27

## 2017-01-27 RX ADMIN — CALCIUM ACETATE 2001 MG: 667 CAPSULE ORAL at 17:57

## 2017-01-27 RX ADMIN — ASPIRIN 81 MG 81 MG: 81 TABLET ORAL at 22:26

## 2017-01-27 RX ADMIN — DAPSONE 100 MG: 25 TABLET ORAL at 22:26

## 2017-01-27 RX ADMIN — CLOPIDOGREL 75 MG: 75 TABLET, FILM COATED ORAL at 22:43

## 2017-01-27 RX ADMIN — MYCOPHENOLATE MOFETIL 500 MG: 250 CAPSULE ORAL at 22:26

## 2017-01-27 RX ADMIN — ABACAVIR 600 MG: 300 TABLET, FILM COATED ORAL at 22:27

## 2017-01-27 NOTE — PLAN OF CARE
Problem: Goal Outcome Summary  Goal: Goal Outcome Summary  Outcome: No Change  VS stable. 97% on 1L 02. Diminished LS. Tele is SR. Slept well throughout the night.

## 2017-01-27 NOTE — PROGRESS NOTES
[facility]    Hospitalist Progress Note  Name: Donald Raza    MRN: [id number]  Provider: Lorna Jhaveri MD  Date of Service: 01/27/2017    Assessment and Plan  Summary of Stay: Donald Raza is a 66 year old male who was admitted on 1/25/2017. Patient with PMH including DM2, HIV on ART, NSTEMI, CKD on HD, prostate and colon cancer presented with nausea, vomiting and diarrhea three days after discharging from a ~20 day admission for HCAP re: admitted for  likely viral gastroenteritis.     1. Nausea, vomiting and diarrhea with hyponatremia:   --Likely viral gastroenteritis based on numerous sick contacts at home w/ self limited diseases.   -- C.diff PCR  NEGATIVE  --Enteric bacterial and virus panel Showed positive TERESA virus  --Stool stool cultures negative so far  --Enteric precautions  --Supportive cares with IV anti-emetics etc    2.Abnormal CXR:  -- likely residual from his prior HCAP last admission.   --Recent hospitalization for HCAP from 1/3 to 1/22/17: Completed abx course.      3. Histopry of HIV on ART: Continue antiretroviral medications.    In any case, per ID note and his most recent CD4 was 263 about three weeks prior and HIV virus was not detectable.   --Outpatient follow up with ID after discharge    4.ESRD on HD: Nephrology following  -- Hyperkalemia will be dialyzed today. Nephrology following    5.Hyponatremia: improved.     6.CAD:   -- Resumed asa, plavix and betablocker  ---Held Hydralazine/ARB/imdur. Will resume if BP remains stable.     7.DM2 on insulin:   -Continue home insulin regimen at reduced doses  --Will  Adjust as indicated.Will monitor blood sugar    DVT Prophylaxis: Pneumatic Compression Devices    Code Status: Full Code    Disposition: Expected discharge in 1-2 if he continues to improve. Will consult PT for recommendations.     Interval History     Patient claims that he is feeling better today. He denies nausea or vomiting. Diarrhea improving.     -Data reviewed  today: I reviewed all new labs and imaging reports over the last 24 hours. I personally reviewed no images or EKG's today.    Physical Exam  Temp: 97.5  F (36.4  C) Temp src: Oral BP: 110/57 mmHg Pulse: 81 Heart Rate: 75 Resp: 20 SpO2: 96 % O2 Device: Nasal cannula Oxygen Delivery: 1 LPM  Filed Vitals:    01/26/17 0654 01/27/17 0636   Weight: 75.116 kg (165 lb 9.6 oz) 75.025 kg (165 lb 6.4 oz)     Vital Signs with Ranges  Temp:  [97.5  F (36.4  C)-98.5  F (36.9  C)] 97.5  F (36.4  C)  Pulse:  [68-86] 81  Heart Rate:  [68-83] 75  Resp:  [16-20] 20  BP: ()/(50-79) 110/57 mmHg  SpO2:  [96 %-97 %] 96 %  I/O last 3 completed shifts:  In: 963 [P.O.:960; I.V.:3]  Out: 1000 [Other:1000]      General: Alert, awake, no acute distress.     HEENT: NC/AT, eyes anicteric, external occular movements intact, left parotid fullness noted.  Dentition WNL, MM clearly dry.  Cardiac: RRR, S1, S2.  No murmurs appreciated.  Pulmonary: Normal chest rise, normal work of breathing.  Lungs CTA BL  Abdomen: soft, non-tender, non-distended.  Bowel Sounds Present.  No guarding.  Extremities: left arm AVF noted, seems in good repair w/ bruit.  no deformities.  Warm, well perfused.  Skin: no rashes or lesions noted.  Warm and Dry.  Neuro: No focal deficits noted.  Speech clear.  Coordination and strength grossly normal.  Psych: Appropriate affect.    Medications       [START ON 1/28/2017] ranitidine  150 mg Oral Daily     sodium chloride (PF)  3 mL Intracatheter Q8H     aspirin chewable tablet 81 mg  81 mg Oral QPM     calcium acetate  2,001 mg Oral TID w/meals     clopidogrel (PLAVIX) tablet 75 mg  75 mg Oral QPM     gabapentin  300 mg Oral QAM     gabapentin  600 mg Oral QPM     insulin glargine  30 Units Subcutaneous QAM     insulin aspart  5 Units Subcutaneous TID AC     B complex-C-folic acid  1 capsule Oral Daily     insulin aspart  1-10 Units Subcutaneous TID AC     insulin aspart  1-7 Units Subcutaneous At Bedtime     abacavir  600 mg  Oral QPM     dapsone  100 mg Oral At Bedtime     darunavir  800 mg Oral At Bedtime     dolutegravir  50 mg Oral At Bedtime     mycophenolate  500 mg Oral BID     carvedilol  12.5 mg Oral BID w/meals     ritonavir  100 mg Oral At Bedtime     - MEDICATION INSTRUCTIONS for Dialysis Patients -   Does not apply See Admin Instructions     Data      Recent Labs  Lab 01/27/17  0622 01/26/17  1200 01/25/17  1242   WBC 4.8 4.0 7.4   HGB 8.8* 9.1* 10.2*   HCT 28.4* 29.0* 32.7*   MCV 94 94 94    156 213       Recent Labs  Lab 01/27/17  0622 01/27/17  0018 01/26/17  1200 01/25/17  2200 01/25/17  1242     --  136 136 123*   POTASSIUM 3.9 3.7 6.2*  --  5.0   CHLORIDE 95  --  102  --  87*   CO2 29  --  24  --  26   ANIONGAP 11  --  10  --  10   *  --  172*  --  151*   BUN 37*  --  64*  --  53*   CR 5.94*  --  9.71*  --  7.34*   GFRESTIMATED 9*  --  5*  --  7*   GFRESTBLACK 11*  --  7*  --  9*   PRABHA 8.7  --  9.0  --  9.3     No results for input(s): CULT in the last 168 hours.  No results for input(s): AST, ALT, GGT, ALKPHOS, BILITOTAL, BILICONJ, BILIDIRECT, ISI in the last 168 hours.    Invalid input(s): BILIRUBININDIRECT  No results for input(s): INR in the last 168 hours.    Recent Labs  Lab 01/25/17  2305   LACT 0.9     No results for input(s): TROPONIN, TROPI, TROPR in the last 168 hours.    Invalid input(s): TROP, TROPONINIES  No results for input(s): COLOR, APPEARANCE, URINEGLC, URINEBILI, URINEKETONE, SG, UBLD, URINEPH, PROTEIN, UROBILINOGEN, NITRITE, LEUKEST, RBCU, WBCU in the last 168 hours.    No results found for this or any previous visit (from the past 24 hour(s)).

## 2017-01-27 NOTE — PROGRESS NOTES
SWS PROGRESS NOTE:     D/I: SW consulted for DC planning. PT also consulted and will assess for disposition/DC needs. Per chart review, pt was DC home from [facility] on 1/21 with S.O. support and [facility]. Pt also has two daughters who are involved and supportive.   A: Ongoing  P: SW will complete full assessment once PT has assessed pt an made recommendations. SW following.     Latoya Gonsales, MSW, LGSW [phone]

## 2017-01-27 NOTE — PROGRESS NOTES
01/27/17 1359   Quick Adds   Type of Visit Initial PT Evaluation   Living Environment   Lives With spouse   Living Arrangements house   Home Accessibility stairs to enter home;stairs within home   Number of Stairs to Enter Home 1   Number of Stairs Within Home 5   Self-Care   Activity/Exercise/Self-Care Comment has a walker at home, prior to last admit was not using it, when left here last weekend was still using it   General Information   Onset of Illness/Injury or Date of Surgery - Date 01/25/17   Referring Physician Dr. Lorna Jhaveri   Patient/Family Goals Statement to get stronger to return home   Pertinent History of Current Problem (include personal factors and/or comorbidities that impact the POC) history of CKD on dialysis, diabetes, hypertension, HIV, NSTEMI, prostate cancer and colon cancer who presents to the emergency department today for evaluation of nausea, vomiting and diarrhea. The patient was recently discharged from the hospital on 1/22 after a multiple week stay in which he has to be intubated. He was at dialysis yesterday and had to stop his treatment about 15 minutes early because he wasn't feeling well. His daughter states that they arrived home, he ate dinner and began feeling sick around 9 PM last night. She states that a flu virus has been going around their house with several members of the family having had symptoms of nausea and vomiting that lasted about a day. The patient's daughter notes that her father seems to have been hit harder by these symptoms. He has had some abdominal pain as well as nausea and multiple episodes of diarrhea and vomiting.   General Info Comments reports feeling weak but better than when he came in   Cognitive Status Examination   Orientation orientation to person, place and time   Level of Consciousness alert   Follows Commands and Answers Questions 100% of the time;able to follow multistep instructions   Personal Safety and Judgment intact   Memory  "intact   Bed Mobility   Bed Mobility Comments mod I supine to sit   Transfer Skills   Transfer Comments indep sit to stand   Gait   Gait Comments SBA with FWW ambulated in [place], decreased mark and gait speed, swing through gait, no path deviation, no LOB, no buckling, limited distance due to fatigue   General Therapy Interventions   Planned Therapy Interventions strengthening;progressive activity/exercise   Clinical Impression   Criteria for Skilled Therapeutic Intervention yes, treatment indicated   PT Diagnosis generalized weakness   Influenced by the following impairments weakness   Functional limitations due to impairments decreased endurance   Clinical Presentation Evolving/Changing   Clinical Presentation Rationale musculoskeletal   Clinical Decision Making (Complexity) Low complexity   Therapy Frequency` daily   Predicted Duration of Therapy Intervention (days/wks) 3 days   Anticipated Discharge Disposition Home with Assist   Risk & Benefits of therapy have been explained Yes   Patient, Family & other staff in agreement with plan of care Yes   Clinical Impression Comments pt reports he had home health evaluation set up and needs to finish it   [facility] [employer] \"6 Clicks\"   2016, Trustees of [facility], under license to [employer].  All rights reserved.   6 Clicks Short Forms Basic Mobility Inpatient Short Form   [facility] [employer]  \"6 Clicks\" V.2 Basic Mobility Inpatient Short Form   1. Turning from your back to your side while in a flat bed without using bedrails? 4 - None   2. Moving from lying on your back to sitting on the side of a flat bed without using bedrails? 4 - None   3. Moving to and from a bed to a chair (including a wheelchair)? 3 - A Little   4. Standing up from a chair using your arms (e.g., wheelchair, or bedside chair)? 3 - A Little   5. To walk in hospital room? 3 - A Little   6. Climbing 3-5 steps with a railing? 3 - A Little   Basic Mobility Raw Score (Score " out of 24.Lower scores equate to lower levels of function) 20   Total Evaluation Time   Total Evaluation Time (Minutes) 10

## 2017-01-27 NOTE — PLAN OF CARE
Very pleasant, [place] man who states feels better than yesterday.  Denies pain, nausea and no stools for  3 shifts.  Appetite good.  Up with one and walker in ar[place] and chair for meals  Tele SR

## 2017-01-27 NOTE — PLAN OF CARE
Problem: Goal Outcome Summary  Goal: Goal Outcome Summary  PT: Eval complete but limited as pt's endurance is low. Ambulated with walker in [place] 100 ft, pt requested to be done after the walk and wanted to lie down to rest. He reports he feels very weak. He is steady on his feet and I recommend he walk with staff 3x's a day to gain strength. Pt would benefit from a couple more days to get stronger before going home. Pt reports the home health nurse had started his evaluation but didn't finish, recommend resuming home health as previous. Pt has a walker at home, will need to be able to do stairs to go home.

## 2017-01-27 NOTE — PLAN OF CARE
Problem: Goal Outcome Summary  Goal: Goal Outcome Summary  Outcome: No Change  Denies pain, VS stable.Dialysis done at 1830 today. No BM tonight. Continue to monitor.

## 2017-01-28 ENCOUNTER — APPOINTMENT (OUTPATIENT)
Dept: PHYSICAL THERAPY | Facility: CLINIC | Age: 67
DRG: 391 | End: 2017-01-28
Payer: MEDICARE

## 2017-01-28 LAB
ANION GAP SERPL CALCULATED.3IONS-SCNC: 15 MMOL/L (ref 3–14)
BASOPHILS # BLD AUTO: 0 10E9/L (ref 0–0.2)
BASOPHILS NFR BLD AUTO: 0.5 %
BUN SERPL-MCNC: 64 MG/DL (ref 7–30)
CALCIUM SERPL-MCNC: 8.5 MG/DL (ref 8.5–10.1)
CHLORIDE SERPL-SCNC: 92 MMOL/L (ref 94–109)
CO2 SERPL-SCNC: 26 MMOL/L (ref 20–32)
CREAT SERPL-MCNC: 7.9 MG/DL (ref 0.66–1.25)
DIFFERENTIAL METHOD BLD: ABNORMAL
EOSINOPHIL # BLD AUTO: 0.2 10E9/L (ref 0–0.7)
EOSINOPHIL NFR BLD AUTO: 5.8 %
ERYTHROCYTE [DISTWIDTH] IN BLOOD BY AUTOMATED COUNT: 15.4 % (ref 10–15)
GFR SERPL CREATININE-BSD FRML MDRD: 7 ML/MIN/1.7M2
GLUCOSE BLDC GLUCOMTR-MCNC: 118 MG/DL (ref 70–99)
GLUCOSE BLDC GLUCOMTR-MCNC: 122 MG/DL (ref 70–99)
GLUCOSE BLDC GLUCOMTR-MCNC: 133 MG/DL (ref 70–99)
GLUCOSE BLDC GLUCOMTR-MCNC: 142 MG/DL (ref 70–99)
GLUCOSE BLDC GLUCOMTR-MCNC: 67 MG/DL (ref 70–99)
GLUCOSE SERPL-MCNC: 134 MG/DL (ref 70–99)
HCT VFR BLD AUTO: 25.8 % (ref 40–53)
HGB BLD-MCNC: 8.2 G/DL (ref 13.3–17.7)
IMM GRANULOCYTES # BLD: 0 10E9/L (ref 0–0.4)
IMM GRANULOCYTES NFR BLD: 0.2 %
LYMPHOCYTES # BLD AUTO: 1.1 10E9/L (ref 0.8–5.3)
LYMPHOCYTES NFR BLD AUTO: 27.7 %
MCH RBC QN AUTO: 29 PG (ref 26.5–33)
MCHC RBC AUTO-ENTMCNC: 31.8 G/DL (ref 31.5–36.5)
MCV RBC AUTO: 91 FL (ref 78–100)
MONOCYTES # BLD AUTO: 0.4 10E9/L (ref 0–1.3)
MONOCYTES NFR BLD AUTO: 9 %
NEUTROPHILS # BLD AUTO: 2.3 10E9/L (ref 1.6–8.3)
NEUTROPHILS NFR BLD AUTO: 56.8 %
NRBC # BLD AUTO: 0 10*3/UL
NRBC BLD AUTO-RTO: 0 /100
PLATELET # BLD AUTO: 134 10E9/L (ref 150–450)
POTASSIUM SERPL-SCNC: 4.5 MMOL/L (ref 3.4–5.3)
RBC # BLD AUTO: 2.83 10E12/L (ref 4.4–5.9)
SODIUM SERPL-SCNC: 133 MMOL/L (ref 133–144)
WBC # BLD AUTO: 4.1 10E9/L (ref 4–11)

## 2017-01-28 PROCEDURE — A9270 NON-COVERED ITEM OR SERVICE: HCPCS | Mod: GY | Performed by: INTERNAL MEDICINE

## 2017-01-28 PROCEDURE — 25000131 ZZH RX MED GY IP 250 OP 636 PS 637: Mod: GY | Performed by: INTERNAL MEDICINE

## 2017-01-28 PROCEDURE — 00000146 ZZHCL STATISTIC GLUCOSE BY METER IP

## 2017-01-28 PROCEDURE — 25000132 ZZH RX MED GY IP 250 OP 250 PS 637: Mod: GY | Performed by: INTERNAL MEDICINE

## 2017-01-28 PROCEDURE — 36415 COLL VENOUS BLD VENIPUNCTURE: CPT | Performed by: INTERNAL MEDICINE

## 2017-01-28 PROCEDURE — 99232 SBSQ HOSP IP/OBS MODERATE 35: CPT | Performed by: INTERNAL MEDICINE

## 2017-01-28 PROCEDURE — 12000007 ZZH R&B INTERMEDIATE

## 2017-01-28 PROCEDURE — 90937 HEMODIALYSIS REPEATED EVAL: CPT

## 2017-01-28 PROCEDURE — 80048 BASIC METABOLIC PNL TOTAL CA: CPT | Performed by: INTERNAL MEDICINE

## 2017-01-28 PROCEDURE — 85025 COMPLETE CBC W/AUTO DIFF WBC: CPT | Performed by: INTERNAL MEDICINE

## 2017-01-28 PROCEDURE — 97110 THERAPEUTIC EXERCISES: CPT | Mod: GP | Performed by: PHYSICAL THERAPIST

## 2017-01-28 PROCEDURE — 40000193 ZZH STATISTIC PT WARD VISIT: Performed by: PHYSICAL THERAPIST

## 2017-01-28 PROCEDURE — 63400005 ZZH RX 634: Performed by: INTERNAL MEDICINE

## 2017-01-28 PROCEDURE — 25000128 H RX IP 250 OP 636: Performed by: INTERNAL MEDICINE

## 2017-01-28 RX ORDER — ALBUMIN, HUMAN INJ 5% 5 %
250 SOLUTION INTRAVENOUS
Status: DISCONTINUED | OUTPATIENT
Start: 2017-01-28 | End: 2017-01-28

## 2017-01-28 RX ORDER — ALBUMIN (HUMAN) 12.5 G/50ML
50 SOLUTION INTRAVENOUS
Status: DISCONTINUED | OUTPATIENT
Start: 2017-01-28 | End: 2017-01-28

## 2017-01-28 RX ADMIN — ABACAVIR 600 MG: 300 TABLET, FILM COATED ORAL at 19:07

## 2017-01-28 RX ADMIN — GABAPENTIN 600 MG: 300 CAPSULE ORAL at 19:36

## 2017-01-28 RX ADMIN — SODIUM CHLORIDE 250 ML: 9 INJECTION, SOLUTION INTRAVENOUS at 14:15

## 2017-01-28 RX ADMIN — ERYTHROPOIETIN 10000 UNITS: 10000 INJECTION, SOLUTION INTRAVENOUS; SUBCUTANEOUS at 15:07

## 2017-01-28 RX ADMIN — Medication 1 CAPSULE: at 10:50

## 2017-01-28 RX ADMIN — CARVEDILOL 12.5 MG: 12.5 TABLET, FILM COATED ORAL at 19:07

## 2017-01-28 RX ADMIN — INSULIN ASPART 5 UNITS: 100 INJECTION, SOLUTION INTRAVENOUS; SUBCUTANEOUS at 08:04

## 2017-01-28 RX ADMIN — MYCOPHENOLATE MOFETIL 500 MG: 250 CAPSULE ORAL at 10:50

## 2017-01-28 RX ADMIN — CLOPIDOGREL 75 MG: 75 TABLET, FILM COATED ORAL at 19:36

## 2017-01-28 RX ADMIN — DOLUTEGRAVIR SODIUM 50 MG: 50 TABLET, FILM COATED ORAL at 22:10

## 2017-01-28 RX ADMIN — MYCOPHENOLATE MOFETIL 500 MG: 250 CAPSULE ORAL at 22:10

## 2017-01-28 RX ADMIN — RITONAVIR 100 MG: 100 TABLET, FILM COATED ORAL at 22:10

## 2017-01-28 RX ADMIN — ASPIRIN 81 MG 81 MG: 81 TABLET ORAL at 19:07

## 2017-01-28 RX ADMIN — DARUNAVIR 800 MG: 800 TABLET, FILM COATED ORAL at 22:10

## 2017-01-28 RX ADMIN — INSULIN GLARGINE 30 UNITS: 100 INJECTION, SOLUTION SUBCUTANEOUS at 08:03

## 2017-01-28 RX ADMIN — CALCIUM ACETATE 2001 MG: 667 CAPSULE ORAL at 18:59

## 2017-01-28 RX ADMIN — DAPSONE 100 MG: 25 TABLET ORAL at 22:11

## 2017-01-28 NOTE — PLAN OF CARE
Problem: Diarrhea (Adult)  Goal: Identify Related Risk Factors and Signs and Symptoms  Related risk factors and signs and symptoms are identified upon initiation of Human Response Clinical Practice Guideline (CPG)   Outcome: Improving  No loose stools this 3-11 shift.  VSS, afebrile. Up with 1 & W.   Tele SR in the 70's.  Will have Dialysis tomorrow, AV fistula with positive thrill and bruit.   POC reviewed with pt & family, questions answered.

## 2017-01-28 NOTE — PLAN OF CARE
Problem: Goal Outcome Summary  Goal: Goal Outcome Summary    PT:  Pt reporting fatigue after taking shower with [facility] staff support recently, agreeable to some activity.  Amb hallways x 150ft with or without FWW, SBA.  Pt voicing that he will not use walker at time of discharge, agreeable to using cane.  Advised to use AD until strength/endurance improves.  Recommend pt amb hallways with [facility] staff 3x/day, d/c home with home health PT to improve strength/endurance/balance when medically stable.

## 2017-01-28 NOTE — PROGRESS NOTES
POTASSIUM      4.5   1/28/2017  HGB      8.2   1/28/2017     All machine safety checks completed and passed.  Total chlorine checks less than 0.1ppm   All connections secured, saline line double clamped.  Venous and arterial parameters set  Report rec'd from Cristina Nicole RN    Rec'd pt per W/C  acc'd by transport team. Consent obtained for dialysis Rx this hospitalization.  Good circulation to fistula arm. Time out taken.    LAF cannulated with 15 Ga needles with no difficulty. Tourniquet used.     Dialysis started with good flows. Pt ran 3.5 hours on a K 3 bath, with 1.25 L removed. Blood flow rate of 400 ml/min was obtained.   PRE-WEIGHT:78.9 Kg,    TARGET WEIGHT unknown,     POST-WT 77.7 Kg.        Complications: none.   Education regarding ESRD given to patient with good understanding.     Vascular Access: Aseptic prep done for both on/off  Total Heparin given: none      Meds given: epogen.        Dr. Chaves updated re: this dialysis run over the phone. .   Transducers checked Q 15 minutes, remain clear throughout Rx.  Machine water alarms in place and functioning.  Total blood volume processed:72.9 L  Hemostasis of needle sites achieved after 10 minutes. Patient dialyzes at [place] Q T, TH, SA.  POST ASSESSMENTS: Skin warm and dry, alert, denies discomfort, Lungs clear, Resp rate regular, apical rate regular. No edema.  See flowsheet in EPIC for further details.  Report given to Yee Adams RN.     [facility]

## 2017-01-28 NOTE — PROGRESS NOTES
[facility]    Hospitalist Progress Note  Name: Donald Raza    MRN: [id number]  Provider: Lorna Jhaveri MD  Date of Service: 01/28/2017    Assessment and Plan  Summary of Stay: Donald Raza is a 66 year old male who was admitted on 1/25/2017. Patient with PMH including DM2, HIV on ART, NSTEMI, CKD on HD, prostate and colon cancer presented with nausea, vomiting and diarrhea three days after discharging from a ~20 day admission for HCAP re: admitted for  likely viral gastroenteritis.     1. Nausea, vomiting and diarrhea with hyponatremia:   --Likely viral gastroenteritis based on numerous sick contacts at home w/ self limited diseases.   --Enteric bacterial and virus panel showed positive TERESA virus. C. diff negative.   --Stool cultures negative so far.   --Enteric precautions. Supportive cares with IV anti-emetics etc    2.Abnormal CXR:  -- likely residual from his prior HCAP last admission.   --Recent hospitalization for HCAP from 1/3 to 1/22/17. Completed abx course.      3. Histopry of HIV on ART: Continue antiretroviral medications.  -- In any case, per ID note and his most recent CD4 was 263 about three weeks prior and HIV virus was not detectable.   --Outpatient follow up with ID after discharge    4.ESRD on HD: HD today. Nephrology following    5.Hyponatremia: improved.     6.CAD:   -- Resumed asa, plavix and betablocker  ---Held Hydralazine/ARB/imdur. Will resume if BP remains stable.     7.DM2 on insulin:   -Continue home insulin regimen at reduced doses  --Will  Adjust as indicated.Will monitor blood sugar    DVT Prophylaxis: Pneumatic Compression Devices    Code Status: Full Code    Disposition: Expected discharge tomorrow if stable    Interval History     Patient claims that he is feeling significantly better. Diarrhea improving.     -Data reviewed today: I reviewed all new labs and imaging reports over the last 24 hours. I personally reviewed no images or EKG's today.    Physical  Exam  Temp: 97.4  F (36.3  C) Temp src: Oral BP: 137/68 mmHg   Heart Rate: 59 Resp: 16 SpO2: 95 % O2 Device: None (Room air)    Filed Vitals:    01/26/17 0654 01/27/17 0636 01/28/17 0128   Weight: 75.116 kg (165 lb 9.6 oz) 75.025 kg (165 lb 6.4 oz) 78.881 kg (173 lb 14.4 oz)     Vital Signs with Ranges  Temp:  [97.3  F (36.3  C)-98.1  F (36.7  C)] 97.4  F (36.3  C)  Heart Rate:  [59-78] 59  Resp:  [16-20] 16  BP: (107-137)/(63-68) 137/68 mmHg  SpO2:  [94 %-98 %] 95 %  I/O last 3 completed shifts:  In: 360 [P.O.:360]  Out: -       General: Alert, awake, no acute distress.     HEENT: NC/AT, eyes anicteric, external occular movements intact, left parotid fullness noted.  Dentition WNL, MM clearly dry.  Cardiac: RRR, S1, S2.  No murmurs appreciated.  Pulmonary: Normal chest rise, normal work of breathing.  Lungs CTA BL  Abdomen: soft, non-tender, non-distended.  Bowel Sounds Present.  No guarding.  Extremities: left arm AVF noted, seems in good repair w/ bruit.  no deformities.  Warm, well perfused.  Skin: no rashes or lesions noted.  Warm and Dry.  Neuro: No focal deficits noted.  Speech clear.  Coordination and strength grossly normal.  Psych: Appropriate affect.    Medications    - MEDICATION INSTRUCTIONS -         sodium chloride 0.9%  250 mL Hemodialysis Machine Once     sodium chloride 0.9%  250-1,000 mL Intravenous Once in dialysis     epoetin brittni (EPOGEN,PROCRIT) inj ESRD  10,000 Units Intravenous See Admin Instructions     ranitidine  150 mg Oral Daily     sodium chloride (PF)  3 mL Intracatheter Q8H     aspirin chewable tablet 81 mg  81 mg Oral QPM     calcium acetate  2,001 mg Oral TID w/meals     clopidogrel (PLAVIX) tablet 75 mg  75 mg Oral QPM     gabapentin  300 mg Oral QAM     gabapentin  600 mg Oral QPM     insulin glargine  30 Units Subcutaneous QAM     insulin aspart  5 Units Subcutaneous TID AC     B complex-C-folic acid  1 capsule Oral Daily     insulin aspart  1-10 Units Subcutaneous TID AC      insulin aspart  1-7 Units Subcutaneous At Bedtime     abacavir  600 mg Oral QPM     dapsone  100 mg Oral At Bedtime     darunavir  800 mg Oral At Bedtime     dolutegravir  50 mg Oral At Bedtime     mycophenolate  500 mg Oral BID     carvedilol  12.5 mg Oral BID w/meals     ritonavir  100 mg Oral At Bedtime     - MEDICATION INSTRUCTIONS for Dialysis Patients -   Does not apply See Admin Instructions     Data      Recent Labs  Lab 01/28/17  0745 01/27/17  0622 01/26/17  1200   WBC 4.1 4.8 4.0   HGB 8.2* 8.8* 9.1*   HCT 25.8* 28.4* 29.0*   MCV 91 94 94   * 159 156       Recent Labs  Lab 01/28/17  0745 01/27/17 0622 01/27/17  0018 01/26/17  1200    135  --  136   POTASSIUM 4.5 3.9 3.7 6.2*   CHLORIDE 92* 95  --  102   CO2 26 29  --  24   ANIONGAP 15* 11  --  10   * 107*  --  172*   BUN 64* 37*  --  64*   CR 7.90* 5.94*  --  9.71*   GFRESTIMATED 7* 9*  --  5*   GFRESTBLACK 8* 11*  --  7*   PRABHA 8.5 8.7  --  9.0     No results for input(s): CULT in the last 168 hours.  No results for input(s): AST, ALT, GGT, ALKPHOS, BILITOTAL, BILICONJ, BILIDIRECT, ISI in the last 168 hours.    Invalid input(s): BILIRUBININDIRECT  No results for input(s): INR in the last 168 hours.    Recent Labs  Lab 01/25/17  2305   LACT 0.9     No results for input(s): TROPONIN, TROPI, TROPR in the last 168 hours.    Invalid input(s): TROP, TROPONINIES  No results for input(s): COLOR, APPEARANCE, URINEGLC, URINEBILI, URINEKETONE, SG, UBLD, URINEPH, PROTEIN, UROBILINOGEN, NITRITE, LEUKEST, RBCU, WBCU in the last 168 hours.    No results found for this or any previous visit (from the past 24 hour(s)).

## 2017-01-28 NOTE — PLAN OF CARE
Problem: Goal Outcome Summary  Goal: Goal Outcome Summary  Outcome: No Change  VSS, denies pain. Pt. Slept well throughout night. X1 incontinent episode. . Transfers with assist of x1 and walker. Tele- SR. Dialysis today. Plan for possible discharge in 1-2 days.

## 2017-01-28 NOTE — PLAN OF CARE
Problem: Goal Outcome Summary  Goal: Goal Outcome Summary  Outcome: No Change    A/O x4. VSS. Up with A1 and walker, up to chair. Certain meds held prior to dialysis - see MAR.  and 142. No diarrhea for the past 2 days. Pt is at dialysis now. Tele - SR. Report given to oncoming RN. Possible d/c tomorrow if stable.

## 2017-01-28 NOTE — PROGRESS NOTES
[facility]     Renal Progress Note       SHORTHAND KEY FOR MY NOTES:  c = with, s = without, p = after, a = before, x = except, asx = asymptomatic, tx = transplant or treatment, sx = symptoms or symptomatic, cx = canceled or culture, rxn = reaction, yday = yesterday, nl = normal, abx = antibiotics, fxn = function, dx = diagnosis, dz = disease, m/h = melena/hematochezia, c/d/l/ha = cramping/dizziness/lightheadedness/headache, d/c = discharge or diarrhea/constipation, f/c/n/v = fevers/chills/nausea/vomiting, cp/sob = chest pain/shortness of breath.         Assessment/Plan:     1.  ESKD.  Pt dialysing per TRS schedule.  No expected issues c the run.  A.  HD today.    2.  Norovirus.  Pt is better clinically - no significant diarrhea/vomiting anymore.  Still weak, though.  A.  Follow clinically.    3.  Dispo.  Home tmrw?        Interval History:     Pt is feeling much better, but remains weak.  He also gets wiped out p HD, so he is not sure he'll be able to dc today.  No cp/sob and diarrhea has improved.  No f/c/n/v.  Eating ok.          Medications and Allergies:       sodium chloride 0.9%  250 mL Hemodialysis Machine Once     sodium chloride 0.9%  250-1,000 mL Intravenous Once in dialysis     epoetin brittni (EPOGEN,PROCRIT) inj ESRD  10,000 Units Intravenous See Admin Instructions     ranitidine  150 mg Oral Daily     sodium chloride (PF)  3 mL Intracatheter Q8H     aspirin chewable tablet 81 mg  81 mg Oral QPM     calcium acetate  2,001 mg Oral TID w/meals     clopidogrel (PLAVIX) tablet 75 mg  75 mg Oral QPM     gabapentin  300 mg Oral QAM     gabapentin  600 mg Oral QPM     insulin glargine  30 Units Subcutaneous QAM     insulin aspart  5 Units Subcutaneous TID AC     B complex-C-folic acid  1 capsule Oral Daily     insulin aspart  1-10 Units Subcutaneous TID AC     insulin aspart  1-7 Units Subcutaneous At Bedtime     abacavir  600 mg Oral QPM     dapsone  100 mg Oral At Bedtime     darunavir  800 mg  Oral At Bedtime     dolutegravir  50 mg Oral At Bedtime     mycophenolate  500 mg Oral BID     carvedilol  12.5 mg Oral BID w/meals     ritonavir  100 mg Oral At Bedtime     - MEDICATION INSTRUCTIONS for Dialysis Patients -   Does not apply See Admin Instructions        Allergies   Allergen Reactions     Lisinopril      Sulfa Drugs             Physical Exam:     Vitals were reviewed    Heart Rate: 59, Blood pressure 137/68, pulse 81, temperature 97.4  F (36.3  C), temperature source Oral, resp. rate 16, weight 78.881 kg (173 lb 14.4 oz), SpO2 95 %.  Wt Readings from Last 3 Encounters:   01/28/17 78.881 kg (173 lb 14.4 oz)   01/22/17 82.6 kg (182 lb 1.6 oz)   11/28/16 88.905 kg (196 lb)     Intake/Output Summary (Last 24 hours) at 01/28/17 1204  Last data filed at 01/28/17 0911   Gross per 24 hour   Intake    480 ml   Output      0 ml   Net    480 ml     GENERAL APPEARANCE: pleasant, NAD, alert  HEENT:  Eyes/ears/nose/neck grossly normal  RESP: lungs cta b c good efforts, no crackles, rhonchi or wheezes  CV: RRR c 1/6 m, nl S1/S2   ABDOMEN: o/s/nt/nd, bs present  EXTREMITIES/SKIN: no ble edema  OTHER:  + LAF c good thrill/bruit         Data:     CBC RESULTS:     Recent Labs  Lab 01/28/17  0745 01/27/17  0622 01/26/17  1200 01/25/17  1242 01/22/17  0645   WBC 4.1 4.8 4.0 7.4 5.3   RBC 2.83* 3.02* 3.10* 3.48* 2.79*   HGB 8.2* 8.8* 9.1* 10.2* 8.0*   HCT 25.8* 28.4* 29.0* 32.7* 26.0*   * 159 156 213 222     Basic Metabolic Panel:    Recent Labs  Lab 01/28/17  0745 01/27/17  0622 01/27/17  0018 01/26/17  1200 01/25/17  2200 01/25/17  1242 01/22/17  0645    135  --  136 136 123*  --    POTASSIUM 4.5 3.9 3.7 6.2*  --  5.0 4.7   CHLORIDE 92* 95  --  102  --  87*  --    CO2 26 29  --  24  --  26  --    BUN 64* 37*  --  64*  --  53*  --    CR 7.90* 5.94*  --  9.71*  --  7.34*  --    * 107*  --  172*  --  151*  --    PRABHA 8.5 8.7  --  9.0  --  9.3  --      INRNo lab results found in last 7 days.    Attestation:   I have reviewed today's relevant vital signs, notes, medications, labs and imaging.    Bipin Chaves MD  [facility] - Nephrology  [phone]

## 2017-01-29 ENCOUNTER — APPOINTMENT (OUTPATIENT)
Dept: PHYSICAL THERAPY | Facility: CLINIC | Age: 67
DRG: 391 | End: 2017-01-29
Payer: MEDICARE

## 2017-01-29 ENCOUNTER — APPOINTMENT (OUTPATIENT)
Dept: ULTRASOUND IMAGING | Facility: CLINIC | Age: 67
DRG: 391 | End: 2017-01-29
Attending: INTERNAL MEDICINE
Payer: MEDICARE

## 2017-01-29 VITALS
SYSTOLIC BLOOD PRESSURE: 154 MMHG | BODY MASS INDEX: 24.59 KG/M2 | DIASTOLIC BLOOD PRESSURE: 80 MMHG | HEART RATE: 81 BPM | WEIGHT: 176.2 LBS | OXYGEN SATURATION: 95 % | TEMPERATURE: 95.6 F | RESPIRATION RATE: 16 BRPM

## 2017-01-29 LAB
GLUCOSE BLDC GLUCOMTR-MCNC: 109 MG/DL (ref 70–99)
GLUCOSE BLDC GLUCOMTR-MCNC: 111 MG/DL (ref 70–99)
GLUCOSE BLDC GLUCOMTR-MCNC: 281 MG/DL (ref 70–99)

## 2017-01-29 PROCEDURE — 25000132 ZZH RX MED GY IP 250 OP 250 PS 637: Mod: GY | Performed by: INTERNAL MEDICINE

## 2017-01-29 PROCEDURE — 00000146 ZZHCL STATISTIC GLUCOSE BY METER IP

## 2017-01-29 PROCEDURE — 97116 GAIT TRAINING THERAPY: CPT | Mod: GP | Performed by: PHYSICAL THERAPIST

## 2017-01-29 PROCEDURE — 93971 EXTREMITY STUDY: CPT | Mod: LT

## 2017-01-29 PROCEDURE — 99238 HOSP IP/OBS DSCHRG MGMT 30/<: CPT | Performed by: INTERNAL MEDICINE

## 2017-01-29 PROCEDURE — 25000131 ZZH RX MED GY IP 250 OP 636 PS 637: Mod: GY | Performed by: INTERNAL MEDICINE

## 2017-01-29 PROCEDURE — 40000193 ZZH STATISTIC PT WARD VISIT: Performed by: PHYSICAL THERAPIST

## 2017-01-29 PROCEDURE — A9270 NON-COVERED ITEM OR SERVICE: HCPCS | Mod: GY | Performed by: INTERNAL MEDICINE

## 2017-01-29 PROCEDURE — 97110 THERAPEUTIC EXERCISES: CPT | Mod: GP | Performed by: PHYSICAL THERAPIST

## 2017-01-29 RX ADMIN — INSULIN GLARGINE 30 UNITS: 100 INJECTION, SOLUTION SUBCUTANEOUS at 08:20

## 2017-01-29 RX ADMIN — Medication 1 MG: at 02:32

## 2017-01-29 RX ADMIN — INSULIN ASPART 5 UNITS: 100 INJECTION, SOLUTION INTRAVENOUS; SUBCUTANEOUS at 08:29

## 2017-01-29 RX ADMIN — CALCIUM ACETATE 2001 MG: 667 CAPSULE ORAL at 08:20

## 2017-01-29 RX ADMIN — CARVEDILOL 12.5 MG: 12.5 TABLET, FILM COATED ORAL at 08:21

## 2017-01-29 RX ADMIN — RANITIDINE HYDROCHLORIDE 150 MG: 150 TABLET, FILM COATED ORAL at 08:21

## 2017-01-29 RX ADMIN — MYCOPHENOLATE MOFETIL 500 MG: 250 CAPSULE ORAL at 08:21

## 2017-01-29 RX ADMIN — GABAPENTIN 300 MG: 300 CAPSULE ORAL at 08:21

## 2017-01-29 RX ADMIN — ACETAMINOPHEN 650 MG: 325 TABLET, FILM COATED ORAL at 00:19

## 2017-01-29 RX ADMIN — Medication 1 CAPSULE: at 08:20

## 2017-01-29 ASSESSMENT — PAIN DESCRIPTION - DESCRIPTORS: DESCRIPTORS: STABBING

## 2017-01-29 NOTE — PROGRESS NOTES
Cognition:  Pt alert and oriented  Mobility:  Up with SBA one staff tolerated well without dizziness.  Lungs:  Diminished bilaterally.  Heart:  Telemetry SR.  Denies chest pains  Urine:  WDL  Bowels:  WDL  Edema:  None  Diet:  Tolerating renal diet po.  Pain:  Denies  Plan:  Discharged to home with daughter with discharge instructions.

## 2017-01-29 NOTE — PLAN OF CARE
Problem: Diarrhea (Adult)  Goal: Identify Related Risk Factors and Signs and Symptoms  Related risk factors and signs and symptoms are identified upon initiation of Human [employer] Clinical Practice Guideline (CPG)   Outcome: Improving  VSS, afebrile,  O2 sats upper 90's on RA.  Lungs coarse and diminished.  Dialysis today, back to room at approximately 1730.  Tele is SR.  Up to BR w a of 1.  Denies pain.  Had sm formed stool and no diarrhea.  Prob d/c home tomorrow if Norovirus stable.  POc reviewed with pt & family, questions answered.

## 2017-01-29 NOTE — PLAN OF CARE
Problem: Goal Outcome Summary  Goal: Goal Outcome Summary    PT:  Mild balance impairment with gait noted yet, SBA.  Ambulated hallways x 250ft, worked on standing LE strengthening HEP, encouraged to continue on own at home and use his cane, refuses to use his FWW.  Anticipate improvement in balance and gait stability with improved strength.  Recommend d/c home with home vs OP PT to work on strengthening and balance training.      Physical Therapy Discharge Summary    Reason for therapy discharge:    Discharged to home with home therapy.    Progress towards therapy goal(s). See goals on Care Plan in [facility] electronic health record for goal details.  Goals partially met.  Barriers to achieving goals:   discharge from facility.  Progressed with PT, see above.    Therapy recommendation(s):    Continued therapy is recommended.  Rationale/Recommendations:  home vs OP PT to improve strength and balance with gait skills..

## 2017-01-29 NOTE — PROVIDER NOTIFICATION
"MD paged \"Pt. complaining of new today, sharp pain in left calf. Pt. states it sometimes goes away with repositioning. Please advise. Thank you.\"   "

## 2017-01-29 NOTE — PROGRESS NOTES
D: SWS has been following to coordinate d/c. Pt has a d/c order for today.   I: Pt is due to d/c home today.  A: There are no SWS needs.  P: Pt will d/c home today, there are no SWS needs at this time.    Addendum: Pt was just starting home care services on 1/25/17 on the date of [facility] admission. PT/OT is recommended. SW met with pt and his family. Pt just began [facility] services on 1/25/17. Pt was receiving PT/OT/RN. SW sent the referral to [facility]. Plan: There are no further SWS needs at this time.     Sivan Ibrahim, BRIEN  Casual SW [id number]

## 2017-01-29 NOTE — PROGRESS NOTES
X cover  Called for sharp pains in L calf.  Has been hospitalized for a few days.  Will check LE doppler.      LE doppler negative for DVT on left.

## 2017-01-29 NOTE — PLAN OF CARE
Problem: Goal Outcome Summary  Goal: Goal Outcome Summary  Outcome: No Change  VSS, . Transferring with SBA. Tele SR. Pt had complaints of R) flank pain, and new left sided calf pain. MD paged. US ordered. US negative for DVT. Pt restless and unable to sleep, up entire night. PRN melatonin given x1. Pt refused to try klonopin. Plan for expected d/c tonight.

## 2017-01-30 NOTE — DISCHARGE SUMMARY
[facility]    Discharge Summary  Hospitalist    Date of Admission:  1/25/2017  Date of Discharge:  1/29/2017 12:00 PM  Discharging Provider: Lorna Jhaveri MD  Date of Service (when I saw the patient): 01/29/2017    Discharge Diagnoses   1. Nausea, vomiting and diarrhea with hyponatremia    2.Abnormal CXR:     3. 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 Please see H&P for details.    Hospital Course   Summary of Stay: Donald Raza is a 66 year old male who was admitted on 1/25/2017. Patient with PMH including DM2, HIV on ART, NSTEMI, CKD on HD, prostate and colon cancer presented with nausea, vomiting and diarrhea three days after discharging from a ~20 day admission for HCAP re: admitted for  likely viral gastroenteritis.      1. Nausea, vomiting and diarrhea with hyponatremia:    --Likely viral gastroenteritis based on numerous sick contacts at home w/ self limited diseases.   --Enteric bacterial and virus panel showed positive TERESA virus. C. diff negative.   --Stool cultures negative so far.    --Enteric precautions. Her symptoms improved with supportive cares with IV anti-emetics etc    2.Abnormal CXR:  -- likely residual from his prior HCAP last admission.   --Recent hospitalization for HCAP from 1/3 to 1/22/17. Completed abx course.      3. Histopry of HIV on ART: Continued on antiretroviral medications.  -- His most recent CD4 was 263 about three weeks prior and HIV virus was not detectable.   --He was recommended outpatient follow up with ID.    4.ESRD on HD: he underwent dialysis during hospital course.    5.Hyponatremia: improved.     6.CAD:    -- Resumed asa, plavix and betablocker  ---Held Hydralazine/ARB/imdur. His medications were resumed on discharge.    7.DM2 on insulin:    -Continued home insulin regimen. His blood sugar was  monitored closely.  Patient remained stable. He was discharged in a stable condition.     Significant Results and Procedures  Results for orders placed or performed during the hospital encounter of 01/25/17   Chest  XR, 1 view PORTABLE    Narrative    XR CHEST PORT 1 VW 1/25/2017 3:19 PM    HISTORY: fever      Impression    IMPRESSION: Shallow inspiration with some atelectasis or infiltrate  left lung base.    VESNA LEDESMA MD   US Lower Extremity Venous Duplex Left    Narrative    ULTRASOUND VENOUS LEFT LOWER EXTREMITY WITH DOPPLER   1/29/2017 12:46  AM     HISTORY: Left leg pain.    COMPARISON: None.    TECHNIQUE: Spectral waveform and color Doppler evaluation were  performed.    FINDINGS: Normal compressibility and unremarkable Doppler waveform  evaluation of the left common femoral, femoral, popliteal and  posterior tibial veins.      Impression    IMPRESSION: No evidence of thrombus in the major veins of the left  lower extremity.    EN KINSEY MD         Pending Results  None  Code Status  Full Code       Primary Care Physician  [facility]          Discharge Disposition  Discharged to home  Condition at discharge: Stable    Consultations This Hospital Stay  NEPHROLOGY IP CONSULT  SOCIAL WORK IP CONSULT  PHYSICAL THERAPY ADULT IP CONSULT    Time Spent on This Encounter  I, Lorna Jhaveri MD, personally saw the patient today and spent greater than 30 minutes discharging this patient.    Discharge Orders    Reason for your hospital stay   Acute gastroentritis     Activity   Your activity upon discharge: activity as tolerated     Follow-up and recommended labs and tests    Follow up with primary care provider, [facility], within 7 days  Continue outpatient dialysis as scheduled  Resume home PT/OT/RN orders     Diet   Follow this diet upon discharge: Orders Placed This Encounter  Snacks/Supplements Adult: Nepro Oral Supplement; With Meals  Combination  Diet Regular Diet Adult; Dialysis Diet       Discharge Medications  Discharge Medication List as of 1/29/2017  8:17 AM      CONTINUE these medications which have CHANGED    Details   !! insulin lispro (HUMALOG KWIKPEN) 100 UNIT/ML injection Inject 5 Units Subcutaneous 3 times daily (before meals), No Print Out       !! - Potential duplicate medications found. Please discuss with provider.      CONTINUE these medications which have NOT CHANGED    Details   !! insulin lispro (HUMALOG KWIKPEN) 100 UNIT/ML injection Inject Subcutaneous 3 times daily (before meals) Sliding scale - 2 units per 50 over 150, Historical      Multiple Vitamins-Minerals (DIALYVITE 800/ULTRA D) TABS Take 1 tablet by mouth daily, Historical      Omega-3 Fatty Acids (OMEGA-3 FISH OIL PO) Take 2 g by mouth 2 times daily (with meals), Historical      mycophenolate (CELLCEPT - GENERIC EQUIVALENT) 500 MG tablet Take 500 mg by mouth 2 times daily On an empty stomach, Historical      isosorbide mononitrate (IMDUR) 30 MG 24 hr tablet Take 2 tablets (60 mg) by mouth every evening, Disp-30 tablet, R-0, Local Print      hydrALAZINE (APRESOLINE) 50 MG tablet Take 0.5 tablets (25 mg) by mouth 3 times daily, Disp-90 tablet, R-0, Local Print      losartan (COZAAR) 100 MG tablet Take 1 tablet (100 mg) by mouth At Bedtime, Disp-30 tablet, R-0, Local Print      carvedilol (COREG) 12.5 MG tablet Take 1 tablet (12.5 mg) by mouth 2 times daily (with meals), Disp-60 tablet, R-0, Local Print      amLODIPine (NORVASC) 5 MG tablet Take 1 tablet (5 mg) by mouth At Bedtime, Disp-30 tablet, R-0, Local Print      !! gabapentin (NEURONTIN) 300 MG capsule Take 600 mg by mouth every evening, Historical      insulin glargine (LANTUS) 100 UNIT/ML PEN Inject 50 Units Subcutaneous every morning, Historical      nystatin (MYCOSTATIN) cream Apply topically daily as needed for dry skinHistorical      imiquimod (ALDARA) 5 % cream Apply topically as neededHistorical     "  DOXERCALCIFEROL IV Inject 6 mcg into the vein three times a week, Historical      ASPIRIN PO Take 81 mg by mouth every evening , Historical      CLONAZEPAM PO Take 0.5 mg by mouth nightly as needed for anxiety (restless legs), Historical      CLOPIDOGREL BISULFATE PO Take 75 mg by mouth every evening , Historical      abacavir (ZIAGEN) 300 MG tablet Take 600 mg by mouth every evening , Historical      calcium acetate (PHOSLO) 667 MG CAPS 2,001 mg 3 times daily (with meals) Take 3 capsules three times a day with meals, Historical      chlorhexidine (CHLORHEXIDINE) 0.12 % solution Rinse and gargle 15 ml by mouth twice a day as directed., Historical      hydrocortisone (ANUCORT-HC) 25 MG suppository Place 25 mg rectally 2 times daily as needed , Historical      terbinafine (LAMISIL AT) 1 % cream Apply topically 2 times daily as needed Historical      atorvastatin (LIPITOR) 40 MG tablet Take 40 mg by mouth At Bedtime, Historical      epoetin brittni (EPOGEN,PROCRIT) 08206 UNIT/ML injection Inject 11,000 Units Subcutaneous three times a week WITH DIALYSIS, Historical      iron sucrose (VENOFER) 20 MG/ML injection Inject 50 mg into the vein once a week WIth dialysis, Historical      MAGNESIUM OXIDE PO Take 400 mg by mouth At Bedtime, Historical      dolutegravir (TIVICAY) 50 MG tablet Take 50 mg by mouth At Bedtime, Historical      nitroglycerin (NITROSTAT) 0.4 MG SL tablet One tablet under the tongue every 5 minutes if needed for chest pain. May repeat every 5 minutes for a maximum of 3 doses in 15 minutes\", Disp-25 tablet, R-3, Fax      !! gabapentin (NEURONTIN) 300 MG capsule Take 300 mg by mouth every morning , Historical      dapsone 100 MG tablet Take 100 mg by mouth At Bedtime , Historical      Darunavir Ethanolate (PREZISTA PO) Take 800 mg by mouth At Bedtime., Historical      ritonavir (NORVIR) 100 MG capsule Take 1 capsule by mouth At Bedtime , Historical      order for DME Equipment being ordered: Other: " 4WW  Treatment Diagnosis: Decreased activity toleranceDisp-1 each, R-0, Local Print       !! - Potential duplicate medications found. Please discuss with provider.        Allergies  Allergies   Allergen Reactions     Lisinopril      Sulfa Drugs      Data  Most Recent 3 CBC's:  Recent Labs   Lab Test  01/28/17   0745  01/27/17   0622  01/26/17   1200   WBC  4.1  4.8  4.0   HGB  8.2*  8.8*  9.1*   MCV  91  94  94   PLT  134*  159  156      Most Recent 3 BMP's:  Recent Labs   Lab Test  01/28/17   0745  01/27/17   0622  01/27/17   0018  01/26/17   1200   NA  133  135   --   136   POTASSIUM  4.5  3.9  3.7  6.2*   CHLORIDE  92*  95   --   102   CO2  26  29   --   24   BUN  64*  37*   --   64*   CR  7.90*  5.94*   --   9.71*   ANIONGAP  15*  11   --   10   PRABHA  8.5  8.7   --   9.0   GLC  134*  107*   --   172*     Most Recent 2 LFT's:  Recent Labs   Lab Test  01/17/17   0523  01/12/17   0526   AST  29  66*   ALT  48  152*   ALKPHOS  54  88   BILITOTAL  0.7  0.8     Most Recent INR's and Anticoagulation Dosing History:  Anticoagulation Dose History     Recent Dosing and Labs Latest Ref Rng 7/23/2012 6/17/2015 5/2/2016 5/19/2016 8/15/2016 1/8/2017    INR 0.86 - 1.14 0.94 1.11 1.00 1.08 0.99 1.41(H)        Most Recent 3 Troponin's:  Recent Labs   Lab Test  01/03/17   2112  01/03/17   1710  01/03/17   1245   05/19/16   0951   TROPI  <0.015  The 99th percentile for upper reference range is 0.045 ug/L.  Troponin values in   the range of 0.045 - 0.120 ug/L may be associated with risks of adverse   clinical events.    <0.015  The 99th percentile for upper reference range is 0.045 ug/L.  Troponin values in   the range of 0.045 - 0.120 ug/L may be associated with risks of adverse   clinical events.    <0.015  The 99th percentile for upper reference range is 0.045 ug/L.  Troponin values in   the range of 0.045 - 0.120 ug/L may be associated with risks of adverse   clinical events.     < >   --    TROPONIN   --    --    --    --    0.02    < > = values in this interval not displayed.     Most Recent Cholesterol Panel:  Recent Labs   Lab Test  01/13/17   0608  09/19/16   CHOL   --    --   126   LDL   --    --   31   HDL   --    --   21*   TRIG  1297*   < >  587*    < > = values in this interval not displayed.     Most Recent 6 Bacteria Isolates From Any Culture (See EPIC Reports for Culture Details):  Recent Labs   Lab Test  01/07/17   1150  01/05/17 2012 01/05/17   2000  05/19/16   1730  03/05/16   2248  03/05/16   2247   CULT  No growth  Incorrectly ordered by PCU/Clinic Canceled, Test credited ordered sputum culture   instead.    No growth  No growth  No growth  Test canceled by physician Duplicate request Charge credited  Test canceled by physician Duplicate request Charge credited  Test canceled by physician Duplicate request Charge credited  Test canceled by physician Duplicate request Charge credited     Most Recent TSH, T4 and A1c Labs:  Recent Labs   Lab Test  01/03/17   2112 09/19/16   TSH   --   1.89   A1C  6.0   --      Most Recent 6 glucoses:  Recent Labs   Lab Test  01/28/17   0745  01/27/17   0622  01/26/17   1200  01/25/17   1242  01/21/17   0558  01/19/17   0541   GLC  134*  107*  172*  151*  127*  217*

## 2017-02-01 DIAGNOSIS — E78.2 MIXED HYPERLIPIDEMIA: ICD-10-CM

## 2017-02-01 DIAGNOSIS — I25.10 CORONARY ARTERY DISEASE: Primary | ICD-10-CM

## 2017-03-02 ENCOUNTER — TELEPHONE (OUTPATIENT)
Dept: TRANSPLANT | Facility: CLINIC | Age: 67
End: 2017-03-02

## 2017-03-02 NOTE — TELEPHONE ENCOUNTER
Call to Pedro and left message on his cell phone. I am wondering how he has come back from his admission in January, does he feel strong enough to look at re-evaluation here. Secondly, was his ^ PSA test ever addressed at [facility]. We will request these records.

## 2017-03-03 ENCOUNTER — TELEPHONE (OUTPATIENT)
Dept: TRANSPLANT | Facility: CLINIC | Age: 67
End: 2017-03-03

## 2017-03-03 NOTE — TELEPHONE ENCOUNTER
Called back today. Generally feeling well, will talk to his kidney doctor if he can now go ahead with completion of his testing for the wait list. He had an angiogram with his last admission which was perfect. He will call back once he has their opinions. No foot ulcers or vascular concerns.     Call from Dr. Thomas, his PCP. She will investigate his the plan for his PSA, last result @ [facility] was in the mid 5's. She is thinking about starting with an xray and then sending back to urology. His biggest issue is cardiovascular. Seeing Dr. Quevedo as his primary cardiologist.

## 2017-03-08 ENCOUNTER — TRANSFERRED RECORDS (OUTPATIENT)
Dept: HEALTH INFORMATION MANAGEMENT | Facility: CLINIC | Age: 67
End: 2017-03-08

## 2017-03-13 ENCOUNTER — TELEPHONE (OUTPATIENT)
Dept: TRANSPLANT | Facility: CLINIC | Age: 67
End: 2017-03-13

## 2017-03-13 ENCOUNTER — TRANSFERRED RECORDS (OUTPATIENT)
Dept: HEALTH INFORMATION MANAGEMENT | Facility: CLINIC | Age: 67
End: 2017-03-13

## 2017-03-13 NOTE — TELEPHONE ENCOUNTER
Patient called to inform Colleen Howell that he is going to be having prostate Bx once he is off Plavix for 7-10 days; Dr. Clemencia Reynolds. Patient is seeing cardiology 4/5/2017; Dr. Diaz.

## 2017-03-16 ENCOUNTER — MEDICAL CORRESPONDENCE (OUTPATIENT)
Dept: TRANSPLANT | Facility: CLINIC | Age: 67
End: 2017-03-16

## 2017-03-28 ENCOUNTER — HOSPITAL ENCOUNTER (INPATIENT)
Facility: CLINIC | Age: 67
LOS: 14 days | Discharge: HOME | DRG: 193 | End: 2017-04-11
Attending: EMERGENCY MEDICINE | Admitting: INTERNAL MEDICINE
Payer: MEDICARE

## 2017-03-28 ENCOUNTER — APPOINTMENT (OUTPATIENT)
Dept: GENERAL RADIOLOGY | Facility: CLINIC | Age: 67
DRG: 193 | End: 2017-03-28
Attending: EMERGENCY MEDICINE
Payer: MEDICARE

## 2017-03-28 DIAGNOSIS — B20 HUMAN IMMUNODEFICIENCY VIRUS (HIV) DISEASE (H): ICD-10-CM

## 2017-03-28 DIAGNOSIS — D64.9 ANEMIA, UNSPECIFIED TYPE: ICD-10-CM

## 2017-03-28 DIAGNOSIS — R65.20 SEVERE SEPSIS (H): ICD-10-CM

## 2017-03-28 DIAGNOSIS — A41.9 SEVERE SEPSIS (H): ICD-10-CM

## 2017-03-28 DIAGNOSIS — R05.9 COUGH: Primary | ICD-10-CM

## 2017-03-28 DIAGNOSIS — J18.9 HEALTHCARE-ASSOCIATED PNEUMONIA: ICD-10-CM

## 2017-03-28 LAB
ALBUMIN SERPL-MCNC: 3.6 G/DL (ref 3.4–5)
ALBUMIN UR-MCNC: ABNORMAL MG/DL
ALP SERPL-CCNC: 120 U/L (ref 40–150)
ALT SERPL W P-5'-P-CCNC: 29 U/L (ref 0–70)
ANION GAP SERPL CALCULATED.3IONS-SCNC: 12 MMOL/L (ref 3–14)
APPEARANCE UR: CLEAR
AST SERPL W P-5'-P-CCNC: 23 U/L (ref 0–45)
BASOPHILS # BLD AUTO: 0 10E9/L (ref 0–0.2)
BASOPHILS NFR BLD AUTO: 0.7 %
BILIRUB SERPL-MCNC: 0.6 MG/DL (ref 0.2–1.3)
BILIRUB UR QL STRIP: NEGATIVE
BUN SERPL-MCNC: 29 MG/DL (ref 7–30)
CALCIUM SERPL-MCNC: 9 MG/DL (ref 8.5–10.1)
CHLORIDE SERPL-SCNC: 95 MMOL/L (ref 94–109)
CO2 BLDCOV-SCNC: 30 MMOL/L (ref 21–28)
CO2 SERPL-SCNC: 28 MMOL/L (ref 20–32)
COLOR UR AUTO: YELLOW
CREAT SERPL-MCNC: 4.61 MG/DL (ref 0.66–1.25)
DIFFERENTIAL METHOD BLD: ABNORMAL
EOSINOPHIL # BLD AUTO: 0.1 10E9/L (ref 0–0.7)
EOSINOPHIL NFR BLD AUTO: 2.1 %
ERYTHROCYTE [DISTWIDTH] IN BLOOD BY AUTOMATED COUNT: 15.3 % (ref 10–15)
FLUAV+FLUBV AG SPEC QL: NEGATIVE
FLUAV+FLUBV AG SPEC QL: NORMAL
GFR SERPL CREATININE-BSD FRML MDRD: 13 ML/MIN/1.7M2
GLUCOSE SERPL-MCNC: 303 MG/DL (ref 70–99)
GLUCOSE UR STRIP-MCNC: >499 MG/DL
HCT VFR BLD AUTO: 21.8 % (ref 40–53)
HGB BLD-MCNC: 6.9 G/DL (ref 13.3–17.7)
HGB UR QL STRIP: NEGATIVE
IMM GRANULOCYTES # BLD: 0 10E9/L (ref 0–0.4)
IMM GRANULOCYTES NFR BLD: 0.5 %
KETONES UR STRIP-MCNC: NEGATIVE MG/DL
LACTATE BLD-SCNC: 2.6 MMOL/L (ref 0.7–2.1)
LEUKOCYTE ESTERASE UR QL STRIP: NEGATIVE
LYMPHOCYTES # BLD AUTO: 0.5 10E9/L (ref 0.8–5.3)
LYMPHOCYTES NFR BLD AUTO: 10.3 %
MCH RBC QN AUTO: 29.7 PG (ref 26.5–33)
MCHC RBC AUTO-ENTMCNC: 31.7 G/DL (ref 31.5–36.5)
MCV RBC AUTO: 94 FL (ref 78–100)
MONOCYTES # BLD AUTO: 0.4 10E9/L (ref 0–1.3)
MONOCYTES NFR BLD AUTO: 9.1 %
NEUTROPHILS # BLD AUTO: 3.4 10E9/L (ref 1.6–8.3)
NEUTROPHILS NFR BLD AUTO: 77.3 %
NITRATE UR QL: NEGATIVE
NRBC # BLD AUTO: 0 10*3/UL
NRBC BLD AUTO-RTO: 0 /100
PCO2 BLDV: 35 MM HG (ref 40–50)
PH BLDV: 7.54 PH (ref 7.32–7.43)
PH UR STRIP: 9 PH (ref 5–7)
PLATELET # BLD AUTO: 109 10E9/L (ref 150–450)
PO2 BLDV: 36 MM HG (ref 25–47)
POTASSIUM SERPL-SCNC: 4 MMOL/L (ref 3.4–5.3)
PROT SERPL-MCNC: 7.6 G/DL (ref 6.8–8.8)
RBC # BLD AUTO: 2.32 10E12/L (ref 4.4–5.9)
RBC #/AREA URNS AUTO: 1 /HPF (ref 0–2)
SAO2 % BLDV FROM PO2: 77 %
SODIUM SERPL-SCNC: 135 MMOL/L (ref 133–144)
SP GR UR STRIP: 1.01 (ref 1–1.03)
SPECIMEN SOURCE: NORMAL
URN SPEC COLLECT METH UR: ABNORMAL
UROBILINOGEN UR STRIP-MCNC: 0 MG/DL (ref 0–2)
WBC # BLD AUTO: 4.4 10E9/L (ref 4–11)
WBC #/AREA URNS AUTO: 1 /HPF (ref 0–2)

## 2017-03-28 PROCEDURE — 85025 COMPLETE CBC W/AUTO DIFF WBC: CPT | Performed by: EMERGENCY MEDICINE

## 2017-03-28 PROCEDURE — 81001 URINALYSIS AUTO W/SCOPE: CPT | Performed by: EMERGENCY MEDICINE

## 2017-03-28 PROCEDURE — 25000125 ZZHC RX 250: Performed by: EMERGENCY MEDICINE

## 2017-03-28 PROCEDURE — 87086 URINE CULTURE/COLONY COUNT: CPT | Performed by: EMERGENCY MEDICINE

## 2017-03-28 PROCEDURE — 87040 BLOOD CULTURE FOR BACTERIA: CPT | Performed by: EMERGENCY MEDICINE

## 2017-03-28 PROCEDURE — 96375 TX/PRO/DX INJ NEW DRUG ADDON: CPT

## 2017-03-28 PROCEDURE — 36415 COLL VENOUS BLD VENIPUNCTURE: CPT | Performed by: EMERGENCY MEDICINE

## 2017-03-28 PROCEDURE — 25000128 H RX IP 250 OP 636: Performed by: EMERGENCY MEDICINE

## 2017-03-28 PROCEDURE — A9270 NON-COVERED ITEM OR SERVICE: HCPCS | Mod: GY | Performed by: EMERGENCY MEDICINE

## 2017-03-28 PROCEDURE — 83605 ASSAY OF LACTIC ACID: CPT

## 2017-03-28 PROCEDURE — 25000132 ZZH RX MED GY IP 250 OP 250 PS 637: Mod: GY | Performed by: EMERGENCY MEDICINE

## 2017-03-28 PROCEDURE — 87804 INFLUENZA ASSAY W/OPTIC: CPT | Performed by: EMERGENCY MEDICINE

## 2017-03-28 PROCEDURE — 80053 COMPREHEN METABOLIC PANEL: CPT | Performed by: EMERGENCY MEDICINE

## 2017-03-28 PROCEDURE — 96365 THER/PROPH/DIAG IV INF INIT: CPT

## 2017-03-28 PROCEDURE — 99223 1ST HOSP IP/OBS HIGH 75: CPT | Mod: AI | Performed by: INTERNAL MEDICINE

## 2017-03-28 PROCEDURE — 99285 EMERGENCY DEPT VISIT HI MDM: CPT | Mod: 25

## 2017-03-28 PROCEDURE — 12000007 ZZH R&B INTERMEDIATE

## 2017-03-28 PROCEDURE — 36415 COLL VENOUS BLD VENIPUNCTURE: CPT

## 2017-03-28 PROCEDURE — 82803 BLOOD GASES ANY COMBINATION: CPT

## 2017-03-28 PROCEDURE — 71020 XR CHEST 2 VW: CPT

## 2017-03-28 PROCEDURE — 25000128 H RX IP 250 OP 636: Performed by: INTERNAL MEDICINE

## 2017-03-28 RX ORDER — LEVOFLOXACIN 5 MG/ML
500 INJECTION, SOLUTION INTRAVENOUS
Status: DISCONTINUED | OUTPATIENT
Start: 2017-03-30 | End: 2017-03-29

## 2017-03-28 RX ORDER — ABACAVIR 300 MG/1
600 TABLET ORAL EVERY EVENING
Status: DISCONTINUED | OUTPATIENT
Start: 2017-03-29 | End: 2017-04-11 | Stop reason: HOSPADM

## 2017-03-28 RX ORDER — CARVEDILOL 12.5 MG/1
12.5 TABLET ORAL 2 TIMES DAILY WITH MEALS
Status: DISCONTINUED | OUTPATIENT
Start: 2017-03-29 | End: 2017-04-11 | Stop reason: HOSPADM

## 2017-03-28 RX ORDER — DEXTROSE MONOHYDRATE 25 G/50ML
25-50 INJECTION, SOLUTION INTRAVENOUS
Status: DISCONTINUED | OUTPATIENT
Start: 2017-03-28 | End: 2017-04-10

## 2017-03-28 RX ORDER — GABAPENTIN 300 MG/1
300 CAPSULE ORAL EVERY MORNING
Status: DISCONTINUED | OUTPATIENT
Start: 2017-03-29 | End: 2017-04-11 | Stop reason: HOSPADM

## 2017-03-28 RX ORDER — OMEPRAZOLE 40 MG/1
40 CAPSULE, DELAYED RELEASE ORAL DAILY
Status: ON HOLD | COMMUNITY
End: 2018-12-06

## 2017-03-28 RX ORDER — ISOSORBIDE MONONITRATE 30 MG/1
60 TABLET, EXTENDED RELEASE ORAL EVERY EVENING
Status: DISCONTINUED | OUTPATIENT
Start: 2017-03-28 | End: 2017-04-11 | Stop reason: HOSPADM

## 2017-03-28 RX ORDER — LEVOFLOXACIN 5 MG/ML
500 INJECTION, SOLUTION INTRAVENOUS ONCE
Status: DISCONTINUED | OUTPATIENT
Start: 2017-03-28 | End: 2017-03-28

## 2017-03-28 RX ORDER — CLONAZEPAM 0.5 MG/1
0.5 TABLET ORAL
Status: DISCONTINUED | OUTPATIENT
Start: 2017-03-28 | End: 2017-04-11 | Stop reason: HOSPADM

## 2017-03-28 RX ORDER — ACETAMINOPHEN 325 MG/1
650 TABLET ORAL EVERY 4 HOURS PRN
Status: DISCONTINUED | OUTPATIENT
Start: 2017-03-28 | End: 2017-04-11 | Stop reason: HOSPADM

## 2017-03-28 RX ORDER — ATORVASTATIN CALCIUM 40 MG/1
40 TABLET, FILM COATED ORAL AT BEDTIME
Status: DISCONTINUED | OUTPATIENT
Start: 2017-03-28 | End: 2017-04-11 | Stop reason: HOSPADM

## 2017-03-28 RX ORDER — DAPSONE 25 MG/1
100 TABLET ORAL AT BEDTIME
Status: DISCONTINUED | OUTPATIENT
Start: 2017-03-28 | End: 2017-04-11 | Stop reason: HOSPADM

## 2017-03-28 RX ORDER — ACETAMINOPHEN 500 MG
500 TABLET ORAL EVERY 4 HOURS PRN
Status: DISCONTINUED | OUTPATIENT
Start: 2017-03-28 | End: 2017-03-28

## 2017-03-28 RX ORDER — ABACAVIR 300 MG/1
600 TABLET ORAL EVERY EVENING
Status: DISCONTINUED | OUTPATIENT
Start: 2017-03-29 | End: 2017-03-28

## 2017-03-28 RX ORDER — LEVOFLOXACIN 5 MG/ML
750 INJECTION, SOLUTION INTRAVENOUS EVERY 24 HOURS
Status: COMPLETED | OUTPATIENT
Start: 2017-03-28 | End: 2017-03-29

## 2017-03-28 RX ORDER — ONDANSETRON 2 MG/ML
4 INJECTION INTRAMUSCULAR; INTRAVENOUS EVERY 6 HOURS PRN
Status: DISCONTINUED | OUTPATIENT
Start: 2017-03-28 | End: 2017-04-11 | Stop reason: HOSPADM

## 2017-03-28 RX ORDER — NALOXONE HYDROCHLORIDE 0.4 MG/ML
.1-.4 INJECTION, SOLUTION INTRAMUSCULAR; INTRAVENOUS; SUBCUTANEOUS
Status: DISCONTINUED | OUTPATIENT
Start: 2017-03-28 | End: 2017-04-11 | Stop reason: HOSPADM

## 2017-03-28 RX ORDER — ONDANSETRON 4 MG/1
4 TABLET, ORALLY DISINTEGRATING ORAL EVERY 6 HOURS PRN
Status: DISCONTINUED | OUTPATIENT
Start: 2017-03-28 | End: 2017-04-11 | Stop reason: HOSPADM

## 2017-03-28 RX ORDER — CLOPIDOGREL BISULFATE 75 MG/1
75 TABLET ORAL EVERY EVENING
Status: DISCONTINUED | OUTPATIENT
Start: 2017-03-28 | End: 2017-04-11 | Stop reason: HOSPADM

## 2017-03-28 RX ORDER — ASPIRIN 81 MG/1
81 TABLET, CHEWABLE ORAL EVERY EVENING
Status: DISCONTINUED | OUTPATIENT
Start: 2017-03-28 | End: 2017-04-09

## 2017-03-28 RX ORDER — NICOTINE POLACRILEX 4 MG
15-30 LOZENGE BUCCAL
Status: DISCONTINUED | OUTPATIENT
Start: 2017-03-28 | End: 2017-04-10

## 2017-03-28 RX ORDER — RITONAVIR 100 MG/1
100 TABLET ORAL AT BEDTIME
Status: DISCONTINUED | OUTPATIENT
Start: 2017-03-28 | End: 2017-04-11 | Stop reason: HOSPADM

## 2017-03-28 RX ADMIN — ONDANSETRON 4 MG: 2 INJECTION INTRAMUSCULAR; INTRAVENOUS at 23:45

## 2017-03-28 RX ADMIN — TAZOBACTAM SODIUM AND PIPERACILLIN SODIUM 2.25 G: 250; 2 INJECTION, SOLUTION INTRAVENOUS at 21:30

## 2017-03-28 RX ADMIN — ACETAMINOPHEN 500 MG: 500 TABLET, FILM COATED ORAL at 20:19

## 2017-03-28 RX ADMIN — VANCOMYCIN HYDROCHLORIDE 2000 MG: 1 INJECTION, POWDER, LYOPHILIZED, FOR SOLUTION INTRAVENOUS at 21:54

## 2017-03-28 ASSESSMENT — ENCOUNTER SYMPTOMS
DIZZINESS: 1
WEAKNESS: 1
FEVER: 1
COUGH: 1
VOMITING: 1
CHILLS: 1

## 2017-03-28 NOTE — IP AVS SNAPSHOT
MRN:[id number]                      After Visit Summary   3/28/2017    Donald Raza    MRN: [id number]           Thank you!     Thank you for choosing [facility] for your care. Our goal is always to provide you with excellent care. Hearing back from our patients is one way we can continue to improve our services. Please take a few minutes to complete the written survey that you may receive in the mail after you visit. If you would like to speak to someone directly about your visit please contact Patient Relations at [phone]. Thank you!          Patient Information     Date Of Birth          1950        Designated Caregiver       Most Recent Value    Caregiver    Will someone help with your care after discharge? yes    Name of designated caregiver Breanne Raza [wife]    Phone number of caregiver [phone]    Caregiver address [address] [place], [place]       About your hospital stay     You were admitted on:  March 28, 2017 You last received care in the:  [place] [address] [facility]    You were discharged on:  April 11, 2017       Who to Call     For medical emergencies, please call 911.  For non-urgent questions about your medical care, please call your primary care provider or clinic, [phone]          Attending Provider     Provider Specialty    Jose Michaud MD Emergency Medicine    Joshua Diaz MD Internal Medicine       Primary Care Provider Office Phone # Fax #    Aida Thomas [phone]       [facility] [address] [facility] [address]        After Care Instructions     Activity       Your activity upon discharge: activity as tolerated            Diet       Follow this diet upon discharge: dialysis diet                  Follow-up Appointments     Follow-up and recommended labs and tests        Recommend to have your hemoglobin checked in 3-5 days either at dialysis or at your primary MD office.   Hemoglobin is 7.3 on discharge from the hospital today    Recommend you have a repeat chest xray done in 4-6 weeks with your primary MD to make sure pneumonia has resolved on xray    Stop taking aspirin - this was stopped as it is possible this could be contributing to your low blood counts and anemia                  Your next 10 appointments already scheduled     May 15, 2017  8:45 AM CDT   Return Visit with Arnoldo Diaz MD   [facility] [place] ([facility])    [address] [facility] [address]  [facility] [id number] [phone]            May 16, 2017  8:40 AM CDT   (Arrive by 8:25 AM)   RETURN WAIT LIST with Hal Rodriguez MD   [facility]    [address]  3rd Floor  [facility] [address] [phone]              Additional Services     MED THERAPY MANAGE REFERRAL       Patient has diagnosis of Diabetes, Heart Failure, COPD, or AMI and is on more than 5 medications                  Further instructions from your care team         Discharge Instructions for Pneumonia  You have been diagnosed with pneumonia, a serious lung infection. Most cases of pneumonia are caused by bacteria. Pneumonia most often occurs in older adults, young children, and people with chronic health problems.  Home care    Take your medication exactly as directed. Don t skip doses. Continue taking your antibiotics as directed until they are all gone -- even if you start to feel better. This will prevent the pneumonia from coming back.    Drink at least 8 glasses of water daily, unless directed otherwise. This helps to loosen and thin secretions so that you can cough them up.    Use a cool-mist humidifier in your bedroom. Be sure to clean the humidifier daily.    Coughing up mucus is normal. Don t use medications to suppress your cough unless your cough is dry, painful, or interferes with your sleep. You may use an expectorant if  ordered by your doctor.    Warm compresses or a heating pad on the lowest setting can be used to relieve chest discomfort. Use several times a day for 15 to 20 minutes at a time. (To prevent injuring your skin, be sure the temperature of the compress or heating pad is warm, not hot.)    Get plenty of rest until your fever, shortness of breath, and chest pain go away.    Plan to get a flu shot every year.    Ask your doctor about pneumonia vaccinations.     Follow-up care  Make a follow-up appointment as directed by our staff.     When to seek medical care  Call 911 right away if you have any of the following:    Chest pain    Trouble breathing    Blue lips or fingernails  Otherwise, call your doctor if you have any of the following:    Fever above 101.5 F  (38.6 C)    Yellow, green, bloody, or smelly sputum    More than normal mucus production    Vomiting     8920-7403 [employer]. [address] [place] [address]. All rights reserved. This information is not intended as a substitute for professional medical care. Always follow your healthcare professional's instructions.          Pending Results     Date and Time Order Name Status Description    4/10/2017 0518 EKG 12-lead, complete Preliminary             Statement of Approval     Ordered          04/11/17 1322  I have reviewed and agree with all the recommendations and orders detailed in this document.  EFFECTIVE NOW     Approved and electronically signed by:  Nelson Worthy MD           04/11/17 7722  I have reviewed and agree with all the recommendations and orders detailed in this document.  EFFECTIVE NOW     Approved and electronically signed by:  Nelson Worthy MD             Admission Information     Date & Time Provider Department Dept. Phone    3/28/2017 Joshua Diaz MD [place] [address] [facility] [phone]      Your Vitals Were     Blood Pressure Pulse Temperature Respirations Height Weight    142/64 82  "97.1  F (36.2  C) (Oral) 20 1.803 m (5' 11\") 96.1 kg (211 lb 12.8 oz)    Pulse Oximetry BMI (Body Mass Index)                96% 29.54 kg/m2          [employer] Information     [employer] lets you send messages to your doctor, view your test results, renew your prescriptions, schedule appointments and more. To sign up, go to [email or web address] . Click on \"Log in\" on the left side of the screen, which will take you to the Welcome page. Then click on \"Sign up Now\" on the right side of the page.     You will be asked to enter the access code listed below, as well as some personal information. Please follow the directions to create your username and password.     Your access code is: [email or web address]  Expires: 2017 10:31 AM     Your access code will  in 90 days. If you need help or a new code, please call your [facility] clinic or [phone].        Care EveryWhere ID     This is your Care EveryWhere ID. This could be used by other organizations to access your [facility] medical records  [id number]           Review of your medicines      START taking        Dose / Directions    albuterol 108 (90 BASE) MCG/ACT Inhaler   Commonly known as:  PROAIR HFA/PROVENTIL HFA/VENTOLIN HFA   Used for:  Cough        Dose:  2 puff   Inhale 2 puffs into the lungs every 6 hours as needed for shortness of breath / dyspnea or wheezing   Quantity:  1 Inhaler   Refills:  1       benzonatate 100 MG capsule   Commonly known as:  TESSALON   Used for:  Cough        Dose:  100 mg   Take 1 capsule (100 mg) by mouth 3 times daily as needed for cough   Quantity:  30 capsule   Refills:  0         CONTINUE these medicines which have NOT CHANGED        Dose / Directions    abacavir 300 MG tablet   Commonly known as:  ZIAGEN        Dose:  600 mg   Take 600 mg by mouth every evening   Refills:  0       amLODIPine 5 MG tablet   Commonly known as:  NORVASC   Used for:  Hypertension secondary to other renal disorders        Dose:  5 mg   Take 1 tablet " (5 mg) by mouth At Bedtime   Quantity:  30 tablet   Refills:  0       ANUCORT-HC 25 MG Suppository   Generic drug:  hydrocortisone        Dose:  25 mg   Place 25 mg rectally 2 times daily as needed   Refills:  0       atorvastatin 40 MG tablet   Commonly known as:  LIPITOR        Dose:  40 mg   Take 40 mg by mouth At Bedtime   Refills:  0       B complex-C-folic acid 1 MG capsule        Dose:  1 capsule   Take 1 capsule by mouth every evening   Refills:  0       calcium acetate 667 MG Caps capsule   Commonly known as:  PHOSLO        Dose:  2001 mg   2,001 mg 3 times daily (with meals) Take 3 capsules three times a day with meals   Refills:  0       carvedilol 12.5 MG tablet   Commonly known as:  COREG   Used for:  Coronary artery disease involving native heart, angina presence unspecified, unspecified vessel or lesion type        Dose:  12.5 mg   Take 1 tablet (12.5 mg) by mouth 2 times daily (with meals)   Quantity:  60 tablet   Refills:  0       chlorhexidine 0.12 % solution   Commonly known as:  PERIDEX        Rinse and gargle 15 ml by mouth twice a day as directed.   Refills:  0       CLONAZEPAM PO        Dose:  0.5 mg   Take 0.5 mg by mouth nightly as needed for anxiety (restless legs)   Refills:  0       CLOPIDOGREL BISULFATE PO        Dose:  75 mg   Take 75 mg by mouth every evening   Refills:  0       dapsone 100 MG tablet        Dose:  100 mg   Take 100 mg by mouth At Bedtime   Refills:  0       DIALYVITE 800/ULTRA D Tabs        Dose:  1 tablet   Take 1 tablet by mouth daily   Refills:  0       dolutegravir 50 MG tablet   Commonly known as:  TIVICAY        Dose:  50 mg   Take 50 mg by mouth At Bedtime   Refills:  0       DOXERCALCIFEROL IV        Dose:  6 mcg   Inject 6 mcg into the vein three times a week   Refills:  0       epoetin brittni 69489 UNIT/ML injection   Commonly known as:  EPOGEN/PROCRIT        Dose:  91846 Units   Inject 11,000 Units Subcutaneous three times a week WITH DIALYSIS   Refills:  0        * gabapentin 300 MG capsule   Commonly known as:  NEURONTIN        Dose:  300 mg   Take 300 mg by mouth every morning   Refills:  0       * gabapentin 300 MG capsule   Commonly known as:  NEURONTIN        Dose:  600 mg   Take 600 mg by mouth every evening   Refills:  0       * HumaLOG KWIKpen 100 UNIT/ML injection   Generic drug:  insulin lispro        Inject Subcutaneous 3 times daily (before meals) Sliding scale - 2 units per 50 over 150   Refills:  0       * insulin lispro 100 UNIT/ML injection   Commonly known as:  HumaLOG KWIKpen   Used for:  Type 2 diabetes mellitus with chronic kidney disease on chronic dialysis, unspecified long term insulin use status (H)        Dose:  5 Units   Inject 5 Units Subcutaneous 3 times daily (before meals)   Refills:  0       hydrALAZINE 50 MG tablet   Commonly known as:  APRESOLINE   Used for:  Hypertension secondary to other renal disorders        Dose:  25 mg   Take 0.5 tablets (25 mg) by mouth 3 times daily   Quantity:  90 tablet   Refills:  0       imiquimod 5 % cream   Commonly known as:  ALDARA        Apply topically as needed   Refills:  0       insulin glargine 100 UNIT/ML injection   Commonly known as:  LANTUS        Dose:  50 Units   Inject 50 Units Subcutaneous every morning   Refills:  0       iron sucrose 20 MG/ML injection   Commonly known as:  VENOFER        Dose:  50 mg   Inject 50 mg into the vein once a week WIth dialysis   Refills:  0       isosorbide mononitrate 30 MG 24 hr tablet   Commonly known as:  IMDUR   Used for:  Coronary artery disease involving native heart, angina presence unspecified, unspecified vessel or lesion type, Hypertension secondary to other renal disorders        Dose:  60 mg   Take 2 tablets (60 mg) by mouth every evening   Quantity:  30 tablet   Refills:  0       lamISIL AT 1 % cream   Generic drug:  terbinafine        Apply topically 2 times daily as needed   Refills:  0       losartan 100 MG tablet   Commonly known as:  COZAAR  "  Used for:  Hypertension secondary to other renal disorders        Dose:  100 mg   Take 1 tablet (100 mg) by mouth At Bedtime   Quantity:  30 tablet   Refills:  0       MAGNESIUM OXIDE PO        Dose:  400 mg   Take 400 mg by mouth At Bedtime   Refills:  0       mycophenolate 500 MG tablet   Commonly known as:  CELLCEPT - GENERIC EQUIVALENT        Dose:  500 mg   Take 500 mg by mouth 2 times daily On an empty stomach   Refills:  0       NEPRO PO        1-2 times daily   Refills:  0       nitroglycerin 0.4 MG sublingual tablet   Commonly known as:  NITROSTAT   Used for:  Coronary artery disease due to lipid rich plaque, Status post coronary angioplasty        One tablet under the tongue every 5 minutes if needed for chest pain. May repeat every 5 minutes for a maximum of 3 doses in 15 minutes\"   Quantity:  25 tablet   Refills:  3       nystatin cream   Commonly known as:  MYCOSTATIN        Apply topically daily as needed for dry skin   Refills:  0       order for DME   Used for:  SOB (shortness of breath), Generalized muscle weakness        Equipment being ordered: Other: 4WW Treatment Diagnosis: Decreased activity tolerance   Quantity:  1 each   Refills:  0       PREZISTA PO        Dose:  800 mg   Take 800 mg by mouth At Bedtime.   Refills:  0       priLOSEC 40 MG capsule   Generic drug:  omeprazole        Dose:  40 mg   Take 40 mg by mouth daily 1 hour before dinner   Refills:  0       ritonavir 100 MG capsule   Commonly known as:  NORVIR        Dose:  1 capsule   Take 1 capsule by mouth At Bedtime   Refills:  0       * Notice:  This list has 4 medication(s) that are the same as other medications prescribed for you. Read the directions carefully, and ask your doctor or other care provider to review them with you.      STOP taking     ASPIRIN PO                Where to get your medicines      These medications were sent to [place], [place] - [address] [facility]  [address] [address] [facility] [address]     Phone:  [phone]     albuterol 108 (90 BASE) MCG/ACT Inhaler    benzonatate 100 MG capsule                Protect others around you: Learn how to safely use, store and throw away your medicines at www.disposemymeds.org.             Medication List: This is a list of all your medications and when to take them. Check marks below indicate your daily home schedule. Keep this list as a reference.      Medications           Morning Afternoon Evening Bedtime As Needed    abacavir 300 MG tablet   Commonly known as:  ZIAGEN   Take 600 mg by mouth every evening   Last time this was given:  600 mg on 4/10/2017  8:18 PM   Next Dose Due:  Tonight 4/11/2017                                   albuterol 108 (90 BASE) MCG/ACT Inhaler   Commonly known as:  PROAIR HFA/PROVENTIL HFA/VENTOLIN HFA   Inhale 2 puffs into the lungs every 6 hours as needed for shortness of breath / dyspnea or wheezing                                   amLODIPine 5 MG tablet   Commonly known as:  NORVASC   Take 1 tablet (5 mg) by mouth At Bedtime   Last time this was given:  10 mg on 4/10/2017 10:28 PM   Next Dose Due:  Tonight 4/11/2017                                   ANUCORT-HC 25 MG Suppository   Place 25 mg rectally 2 times daily as needed   Generic drug:  hydrocortisone                                   atorvastatin 40 MG tablet   Commonly known as:  LIPITOR   Take 40 mg by mouth At Bedtime   Last time this was given:  40 mg on 4/10/2017 10:28 PM   Next Dose Due:  Tonight 4/11/2017                                   B complex-C-folic acid 1 MG capsule   Take 1 capsule by mouth every evening   Last time this was given:  1 capsule on 4/10/2017  8:19 PM   Next Dose Due:  Tonight 4/11/2017                                   benzonatate 100 MG capsule   Commonly known as:  TESSALON   Take 1 capsule (100 mg) by mouth 3 times daily as needed for cough   Last time this was given:  100 mg on 4/11/2017  6:39 AM   Next Dose Due:   not available                                calcium acetate 667 MG Caps capsule   Commonly known as:  PHOSLO   2,001 mg 3 times daily (with meals) Take 3 capsules three times a day with meals   Last time this was given:  2,001 mg on 4/11/2017  1:45 PM   Next Dose Due:  With next meal                                         carvedilol 12.5 MG tablet   Commonly known as:  COREG   Take 1 tablet (12.5 mg) by mouth 2 times daily (with meals)   Last time this was given:  12.5 mg on 4/10/2017  5:30 PM   Next Dose Due:  Tonight with dinner 4/11/2017                                chlorhexidine 0.12 % solution   Commonly known as:  PERIDEX   Rinse and gargle 15 ml by mouth twice a day as directed.                                      CLONAZEPAM PO   Take 0.5 mg by mouth nightly as needed for anxiety (restless legs)   Last time this was given:  0.5 mg on 4/4/2017 10:51 PM                                   CLOPIDOGREL BISULFATE PO   Take 75 mg by mouth every evening   Last time this was given:  75 mg on 4/10/2017  8:21 PM   Next Dose Due:  Tonight 4/11/2017                                   dapsone 100 MG tablet   Take 100 mg by mouth At Bedtime   Last time this was given:  100 mg on 4/10/2017 10:28 PM   Next Dose Due:  Tonight 4/11/2017                                   DIALYVITE 800/ULTRA D Tabs   Take 1 tablet by mouth daily                                dolutegravir 50 MG tablet   Commonly known as:  TIVICAY   Take 50 mg by mouth At Bedtime   Last time this was given:  50 mg on 4/10/2017 10:28 PM   Next Dose Due:  Tonight 4/11/2017                                   DOXERCALCIFEROL IV   Inject 6 mcg into the vein three times a week                                epoetin brittni 10574 UNIT/ML injection   Commonly known as:  EPOGEN/PROCRIT   Inject 11,000 Units Subcutaneous three times a week WITH DIALYSIS   Last time this was given:  10,000 Units on 4/4/2017 10:39 AM                                * gabapentin 300 MG capsule    Commonly known as:  NEURONTIN   Take 300 mg by mouth every morning   Last time this was given:  300 mg on 4/11/2017  1:47 PM   Next Dose Due:  Tomorrow 4/12/2017                                * gabapentin 300 MG capsule   Commonly known as:  NEURONTIN   Take 600 mg by mouth every evening   Last time this was given:  300 mg on 4/11/2017  1:47 PM   Next Dose Due:  Tonight 4/11/2017                                * HumaLOG KWIKpen 100 UNIT/ML injection   Inject Subcutaneous 3 times daily (before meals) Sliding scale - 2 units per 50 over 150   Generic drug:  insulin lispro                                * insulin lispro 100 UNIT/ML injection   Commonly known as:  HumaLOG KWIKpen   Inject 5 Units Subcutaneous 3 times daily (before meals)                                hydrALAZINE 50 MG tablet   Commonly known as:  APRESOLINE   Take 0.5 tablets (25 mg) by mouth 3 times daily   Last time this was given:  25 mg on 4/11/2017  1:46 PM   Next Dose Due:  Take one dose tonight 4/11/2017                                imiquimod 5 % cream   Commonly known as:  ALDARA   Apply topically as needed                                insulin glargine 100 UNIT/ML injection   Commonly known as:  LANTUS   Inject 50 Units Subcutaneous every morning   Last time this was given:  5 Units on 4/1/2017 12:29 PM                                iron sucrose 20 MG/ML injection   Commonly known as:  VENOFER   Inject 50 mg into the vein once a week WIth dialysis                                isosorbide mononitrate 30 MG 24 hr tablet   Commonly known as:  IMDUR   Take 2 tablets (60 mg) by mouth every evening   Last time this was given:  60 mg on 4/10/2017  8:20 PM   Next Dose Due:  Tonight 4/11/2017                                   lamISIL AT 1 % cream   Apply topically 2 times daily as needed   Generic drug:  terbinafine                                   losartan 100 MG tablet   Commonly known as:  COZAAR   Take 1 tablet (100 mg) by mouth At Bedtime  "  Last time this was given:  100 mg on 4/10/2017 10:28 PM   Next Dose Due:  Tonight 4/11/2017                                   MAGNESIUM OXIDE PO   Take 400 mg by mouth At Bedtime   Last time this was given:  400 mg on 4/10/2017 10:28 PM   Next Dose Due:  Tonight 4/11/2017                                   mycophenolate 500 MG tablet   Commonly known as:  CELLCEPT - GENERIC EQUIVALENT   Take 500 mg by mouth 2 times daily On an empty stomach   Next Dose Due:  Take today 4/11/2017                                NEPRO PO   1-2 times daily                                nitroglycerin 0.4 MG sublingual tablet   Commonly known as:  NITROSTAT   One tablet under the tongue every 5 minutes if needed for chest pain. May repeat every 5 minutes for a maximum of 3 doses in 15 minutes\"   Last time this was given:  0.4 mg on 3/31/2017  7:18 AM                                   nystatin cream   Commonly known as:  MYCOSTATIN   Apply topically daily as needed for dry skin                                   order for DME   Equipment being ordered: Other: 4WW Treatment Diagnosis: Decreased activity tolerance                                PREZISTA PO   Take 800 mg by mouth At Bedtime.   Last time this was given:  800 mg on 4/10/2017 10:28 PM   Next Dose Due:  Take tonight 4/11/2017                                   priLOSEC 40 MG capsule   Take 40 mg by mouth daily 1 hour before dinner   Last time this was given:  40 mg on 4/11/2017  1:46 PM   Generic drug:  omeprazole   Next Dose Due:  Tomorrow 4/12/20017                                   ritonavir 100 MG capsule   Commonly known as:  NORVIR   Take 1 capsule by mouth At Bedtime   Next Dose Due:  tonight 4/11/2017                                   * Notice:  This list has 4 medication(s) that are the same as other medications prescribed for you. Read the directions carefully, and ask your doctor or other care provider to review them with you.      "

## 2017-03-28 NOTE — LETTER
Hand-off for Care Transitions to Next Level of Care Provider  Name: Donald Raza  MRN #: [id number]  Reason for Hospitalization:  Human immunodeficiency virus (HIV) disease (H) [B20]  Healthcare-associated pneumonia [J18.9]  Severe sepsis (H) [A41.9, R65.20]  Anemia, unspecified type [D64.9]  Admit Date/Time: 3/28/2017 7:30 PM  Discharge Date: 4/11/17     Reason for Communication Hand-off Referral: Admission diagnoses: PN     Discharge Plan:  Discharged to: Home-independent     Patient agreeable to post-hospital support suggestions:  Yes  Discharge Plan:   Patient is on new medications: Yes  albuterol 108 (90 BASE) MCG/ACT Inhaler   Commonly known as: PROAIR HFA/PROVENTIL HFA/VENTOLIN HFA   Used for: Cough         Dose: 2 puff   Inhale 2 puffs into the lungs every 6 hours as needed for shortness of breath / dyspnea or wheezing   Quantity: 1 Inhaler   Refills: 1            guaiFENesin-dextromethorphan 100-10 MG/5ML syrup   Commonly known as: ROBITUSSIN DM   Used for: Cough          Dose: 5 mL   Take 5 mLs by mouth every 4 hours as needed for cough   Quantity: 118 mL   Refills: 0             MTM follow up recommended: Yes         Follow-up specialty is recommended: Yes  This would communicate all referrals e.g. , C[facility] clinic, Homecare, Cardiac Rehab     Follow-up plan:  Future Appointments  Date Time Provider Department Center   5/15/2017 8:45 AM Arnoldo Diaz MD [facility]   5/16/2017 8:40 AM Hal Rodriguez MD [facility]         Any outstanding tests or procedures:               Key Recommendations: repeat chest xray in 4-6 weeks with PCP to make sure pneumonia has resolved. Pna action plan was reviewed and given to the patient. This has been the patients 3rd admit this year with pna or diabetes related, unsure if there is something more we can do for him at home to help prevent theses admissions?     Kaci Huitron 5027

## 2017-03-28 NOTE — IP AVS SNAPSHOT
[place] [id number] [facility]    [address]    [facility] [address]    Phone:  [phone]    Fax:  [phone]                                       After Visit Summary   3/28/2017    Donald Raza    MRN: [id number]           After Visit Summary Signature Page     I have received my discharge instructions, and my questions have been answered. I have discussed any challenges I see with this plan with the nurse or doctor.    ..........................................................................................................................................  Patient/Patient Representative Signature      ..........................................................................................................................................  Patient Representative Print Name and Relationship to Patient    ..................................................               ................................................  Date                                            Time    ..........................................................................................................................................  Reviewed by Signature/Title    ...................................................              ..............................................  Date                                                            Time

## 2017-03-29 LAB
ANION GAP SERPL CALCULATED.3IONS-SCNC: 10 MMOL/L (ref 3–14)
BACTERIA SPEC CULT: ABNORMAL
BASOPHILS # BLD AUTO: 0 10E9/L (ref 0–0.2)
BASOPHILS NFR BLD AUTO: 0.2 %
BUN SERPL-MCNC: 36 MG/DL (ref 7–30)
CALCIUM SERPL-MCNC: 8.6 MG/DL (ref 8.5–10.1)
CHLORIDE SERPL-SCNC: 97 MMOL/L (ref 94–109)
CO2 SERPL-SCNC: 28 MMOL/L (ref 20–32)
CREAT SERPL-MCNC: 5.5 MG/DL (ref 0.66–1.25)
DIFFERENTIAL METHOD BLD: ABNORMAL
EOSINOPHIL # BLD AUTO: 0.1 10E9/L (ref 0–0.7)
EOSINOPHIL NFR BLD AUTO: 1.4 %
ERYTHROCYTE [DISTWIDTH] IN BLOOD BY AUTOMATED COUNT: 15.4 % (ref 10–15)
FLUAV+FLUBV RNA SPEC QL NAA+PROBE: ABNORMAL
FLUAV+FLUBV RNA SPEC QL NAA+PROBE: ABNORMAL
GFR SERPL CREATININE-BSD FRML MDRD: 10 ML/MIN/1.7M2
GLUCOSE BLDC GLUCOMTR-MCNC: 116 MG/DL (ref 70–99)
GLUCOSE BLDC GLUCOMTR-MCNC: 153 MG/DL (ref 70–99)
GLUCOSE BLDC GLUCOMTR-MCNC: 167 MG/DL (ref 70–99)
GLUCOSE BLDC GLUCOMTR-MCNC: 204 MG/DL (ref 70–99)
GLUCOSE BLDC GLUCOMTR-MCNC: 214 MG/DL (ref 70–99)
GLUCOSE BLDC GLUCOMTR-MCNC: 227 MG/DL (ref 70–99)
GLUCOSE BLDC GLUCOMTR-MCNC: 81 MG/DL (ref 70–99)
GLUCOSE SERPL-MCNC: 169 MG/DL (ref 70–99)
GRAM STN SPEC: ABNORMAL
HBA1C MFR BLD: <3.5 % (ref 4.3–6)
HCT VFR BLD AUTO: 21.3 % (ref 40–53)
HGB BLD-MCNC: 6.5 G/DL (ref 13.3–17.7)
IMM GRANULOCYTES # BLD: 0 10E9/L (ref 0–0.4)
IMM GRANULOCYTES NFR BLD: 0.5 %
LACTATE SERPL-SCNC: 1.2 MMOL/L (ref 0.4–2)
LYMPHOCYTES # BLD AUTO: 0.8 10E9/L (ref 0.8–5.3)
LYMPHOCYTES NFR BLD AUTO: 19.6 %
Lab: ABNORMAL
MCH RBC QN AUTO: 29.3 PG (ref 26.5–33)
MCHC RBC AUTO-ENTMCNC: 30.5 G/DL (ref 31.5–36.5)
MCV RBC AUTO: 96 FL (ref 78–100)
MICRO REPORT STATUS: ABNORMAL
MICRO REPORT STATUS: ABNORMAL
MONOCYTES # BLD AUTO: 0.5 10E9/L (ref 0–1.3)
MONOCYTES NFR BLD AUTO: 10.6 %
NEUTROPHILS # BLD AUTO: 2.9 10E9/L (ref 1.6–8.3)
NEUTROPHILS NFR BLD AUTO: 67.7 %
NRBC # BLD AUTO: 0 10*3/UL
NRBC BLD AUTO-RTO: 0 /100
PLATELET # BLD AUTO: 93 10E9/L (ref 150–450)
POTASSIUM SERPL-SCNC: 4.3 MMOL/L (ref 3.4–5.3)
PROCALCITONIN SERPL-MCNC: 4.35 NG/ML
RBC # BLD AUTO: 2.22 10E12/L (ref 4.4–5.9)
RSV RNA SPEC NAA+PROBE: ABNORMAL
SODIUM SERPL-SCNC: 135 MMOL/L (ref 133–144)
SPECIMEN SOURCE: ABNORMAL
WBC # BLD AUTO: 4.2 10E9/L (ref 4–11)

## 2017-03-29 PROCEDURE — A9270 NON-COVERED ITEM OR SERVICE: HCPCS | Mod: GY | Performed by: INTERNAL MEDICINE

## 2017-03-29 PROCEDURE — 36415 COLL VENOUS BLD VENIPUNCTURE: CPT | Performed by: INTERNAL MEDICINE

## 2017-03-29 PROCEDURE — 84145 PROCALCITONIN (PCT): CPT | Performed by: INTERNAL MEDICINE

## 2017-03-29 PROCEDURE — 99233 SBSQ HOSP IP/OBS HIGH 50: CPT | Performed by: INTERNAL MEDICINE

## 2017-03-29 PROCEDURE — 12000007 ZZH R&B INTERMEDIATE

## 2017-03-29 PROCEDURE — 87631 RESP VIRUS 3-5 TARGETS: CPT | Performed by: INTERNAL MEDICINE

## 2017-03-29 PROCEDURE — 83605 ASSAY OF LACTIC ACID: CPT | Performed by: INTERNAL MEDICINE

## 2017-03-29 PROCEDURE — 85025 COMPLETE CBC W/AUTO DIFF WBC: CPT | Performed by: INTERNAL MEDICINE

## 2017-03-29 PROCEDURE — 25000131 ZZH RX MED GY IP 250 OP 636 PS 637: Mod: GY | Performed by: INTERNAL MEDICINE

## 2017-03-29 PROCEDURE — 25000132 ZZH RX MED GY IP 250 OP 250 PS 637: Mod: GY | Performed by: INTERNAL MEDICINE

## 2017-03-29 PROCEDURE — 00000146 ZZHCL STATISTIC GLUCOSE BY METER IP

## 2017-03-29 PROCEDURE — 25000131 ZZH RX MED GY IP 250 OP 636 PS 637: Mod: GY

## 2017-03-29 PROCEDURE — 87205 SMEAR GRAM STAIN: CPT | Performed by: INTERNAL MEDICINE

## 2017-03-29 PROCEDURE — 25000128 H RX IP 250 OP 636: Performed by: INTERNAL MEDICINE

## 2017-03-29 PROCEDURE — 83036 HEMOGLOBIN GLYCOSYLATED A1C: CPT | Performed by: INTERNAL MEDICINE

## 2017-03-29 PROCEDURE — 80048 BASIC METABOLIC PNL TOTAL CA: CPT | Performed by: INTERNAL MEDICINE

## 2017-03-29 RX ORDER — LEVOFLOXACIN 5 MG/ML
500 INJECTION, SOLUTION INTRAVENOUS
Status: DISCONTINUED | OUTPATIENT
Start: 2017-03-30 | End: 2017-03-29

## 2017-03-29 RX ORDER — MAGNESIUM OXIDE 400 MG/1
400 TABLET ORAL AT BEDTIME
Status: DISCONTINUED | OUTPATIENT
Start: 2017-03-29 | End: 2017-04-11 | Stop reason: HOSPADM

## 2017-03-29 RX ORDER — MYCOPHENOLATE MOFETIL 250 MG/1
500 CAPSULE ORAL 2 TIMES DAILY
Status: DISCONTINUED | OUTPATIENT
Start: 2017-03-29 | End: 2017-04-11 | Stop reason: HOSPADM

## 2017-03-29 RX ORDER — HYDRALAZINE HYDROCHLORIDE 25 MG/1
25 TABLET, FILM COATED ORAL 3 TIMES DAILY
Status: DISCONTINUED | OUTPATIENT
Start: 2017-03-29 | End: 2017-04-11 | Stop reason: HOSPADM

## 2017-03-29 RX ORDER — NICOTINE POLACRILEX 4 MG
15-30 LOZENGE BUCCAL
Status: DISCONTINUED | OUTPATIENT
Start: 2017-03-29 | End: 2017-03-29

## 2017-03-29 RX ORDER — DEXTROSE MONOHYDRATE 25 G/50ML
25-50 INJECTION, SOLUTION INTRAVENOUS
Status: DISCONTINUED | OUTPATIENT
Start: 2017-03-29 | End: 2017-03-29

## 2017-03-29 RX ORDER — GABAPENTIN 300 MG/1
600 CAPSULE ORAL EVERY EVENING
Status: DISCONTINUED | OUTPATIENT
Start: 2017-03-29 | End: 2017-04-11 | Stop reason: HOSPADM

## 2017-03-29 RX ORDER — OSELTAMIVIR PHOSPHATE 30 MG/1
30 CAPSULE ORAL ONCE
Status: COMPLETED | OUTPATIENT
Start: 2017-03-29 | End: 2017-03-29

## 2017-03-29 RX ORDER — VIT B COMP NO.3/FOLIC/C/BIOTIN 1 MG-60 MG
1 TABLET ORAL EVERY EVENING
Status: DISCONTINUED | OUTPATIENT
Start: 2017-03-29 | End: 2017-03-31

## 2017-03-29 RX ORDER — MYCOPHENOLATE MOFETIL 500 MG/1
500 TABLET ORAL 2 TIMES DAILY
Status: DISCONTINUED | OUTPATIENT
Start: 2017-03-29 | End: 2017-03-29

## 2017-03-29 RX ORDER — AMLODIPINE BESYLATE 5 MG/1
5 TABLET ORAL AT BEDTIME
Status: DISCONTINUED | OUTPATIENT
Start: 2017-03-29 | End: 2017-04-05

## 2017-03-29 RX ORDER — LOSARTAN POTASSIUM 100 MG/1
100 TABLET ORAL AT BEDTIME
Status: DISCONTINUED | OUTPATIENT
Start: 2017-03-29 | End: 2017-04-11 | Stop reason: HOSPADM

## 2017-03-29 RX ORDER — OSELTAMIVIR PHOSPHATE 30 MG/1
30 CAPSULE ORAL
Status: COMPLETED | OUTPATIENT
Start: 2017-03-30 | End: 2017-04-01

## 2017-03-29 RX ADMIN — ASPIRIN 81 MG CHEWABLE TABLET 81 MG: 81 TABLET CHEWABLE at 20:54

## 2017-03-29 RX ADMIN — HYDRALAZINE HYDROCHLORIDE 25 MG: 25 TABLET ORAL at 16:37

## 2017-03-29 RX ADMIN — CALCIUM ACETATE 2001 MG: 667 CAPSULE ORAL at 18:50

## 2017-03-29 RX ADMIN — GUAIFENESIN 10 ML: 100 SOLUTION ORAL at 22:15

## 2017-03-29 RX ADMIN — GABAPENTIN 600 MG: 300 CAPSULE ORAL at 20:55

## 2017-03-29 RX ADMIN — TAZOBACTAM SODIUM AND PIPERACILLIN SODIUM 2.25 G: 250; 2 INJECTION, SOLUTION INTRAVENOUS at 03:25

## 2017-03-29 RX ADMIN — RITONAVIR 100 MG: 100 TABLET, FILM COATED ORAL at 21:58

## 2017-03-29 RX ADMIN — HYDRALAZINE HYDROCHLORIDE 25 MG: 25 TABLET ORAL at 09:05

## 2017-03-29 RX ADMIN — CLOPIDOGREL 75 MG: 75 TABLET, FILM COATED ORAL at 01:06

## 2017-03-29 RX ADMIN — LOSARTAN POTASSIUM 100 MG: 100 TABLET, FILM COATED ORAL at 21:58

## 2017-03-29 RX ADMIN — Medication 1 LOZENGE: at 06:16

## 2017-03-29 RX ADMIN — ABACAVIR 600 MG: 300 TABLET, FILM COATED ORAL at 20:54

## 2017-03-29 RX ADMIN — ABACAVIR 600 MG: 300 TABLET, FILM COATED ORAL at 01:10

## 2017-03-29 RX ADMIN — GABAPENTIN 300 MG: 300 CAPSULE ORAL at 08:30

## 2017-03-29 RX ADMIN — ATORVASTATIN CALCIUM 40 MG: 40 TABLET, FILM COATED ORAL at 01:06

## 2017-03-29 RX ADMIN — TAZOBACTAM SODIUM AND PIPERACILLIN SODIUM 2.25 G: 250; 2 INJECTION, SOLUTION INTRAVENOUS at 22:16

## 2017-03-29 RX ADMIN — ACETAMINOPHEN 650 MG: 325 TABLET, FILM COATED ORAL at 08:16

## 2017-03-29 RX ADMIN — MAGNESIUM OXIDE TAB 400 MG (241.3 MG ELEMENTAL MG) 400 MG: 400 (241.3 MG) TAB at 21:58

## 2017-03-29 RX ADMIN — CLOPIDOGREL 75 MG: 75 TABLET, FILM COATED ORAL at 20:54

## 2017-03-29 RX ADMIN — CARVEDILOL 12.5 MG: 12.5 TABLET, FILM COATED ORAL at 18:50

## 2017-03-29 RX ADMIN — DAPSONE 100 MG: 25 TABLET ORAL at 01:10

## 2017-03-29 RX ADMIN — ACETAMINOPHEN 650 MG: 325 TABLET, FILM COATED ORAL at 16:49

## 2017-03-29 RX ADMIN — DOLUTEGRAVIR SODIUM 50 MG: 50 TABLET, FILM COATED ORAL at 21:58

## 2017-03-29 RX ADMIN — ISOSORBIDE MONONITRATE 60 MG: 30 TABLET, EXTENDED RELEASE ORAL at 20:54

## 2017-03-29 RX ADMIN — MYCOPHENOLATE MOFETIL 500 MG: 250 CAPSULE ORAL at 22:04

## 2017-03-29 RX ADMIN — GUAIFENESIN 10 ML: 100 SOLUTION ORAL at 06:16

## 2017-03-29 RX ADMIN — GUAIFENESIN 10 ML: 100 SOLUTION ORAL at 10:14

## 2017-03-29 RX ADMIN — RITONAVIR 100 MG: 100 TABLET, FILM COATED ORAL at 01:08

## 2017-03-29 RX ADMIN — CALCIUM ACETATE 2001 MG: 667 CAPSULE ORAL at 08:29

## 2017-03-29 RX ADMIN — ASPIRIN 81 MG CHEWABLE TABLET 81 MG: 81 TABLET CHEWABLE at 01:06

## 2017-03-29 RX ADMIN — HYDRALAZINE HYDROCHLORIDE 25 MG: 25 TABLET ORAL at 21:57

## 2017-03-29 RX ADMIN — DOLUTEGRAVIR SODIUM 50 MG: 50 TABLET, FILM COATED ORAL at 01:07

## 2017-03-29 RX ADMIN — ATORVASTATIN CALCIUM 40 MG: 40 TABLET, FILM COATED ORAL at 21:58

## 2017-03-29 RX ADMIN — OMEPRAZOLE 40 MG: 20 CAPSULE, DELAYED RELEASE ORAL at 09:05

## 2017-03-29 RX ADMIN — Medication 1 MG: at 20:54

## 2017-03-29 RX ADMIN — CARVEDILOL 12.5 MG: 12.5 TABLET, FILM COATED ORAL at 01:07

## 2017-03-29 RX ADMIN — OSELTAMIVIR PHOSPHATE 30 MG: 30 CAPSULE ORAL at 06:17

## 2017-03-29 RX ADMIN — ACETAMINOPHEN 650 MG: 325 TABLET, FILM COATED ORAL at 23:55

## 2017-03-29 RX ADMIN — TAZOBACTAM SODIUM AND PIPERACILLIN SODIUM 2.25 G: 250; 2 INJECTION, SOLUTION INTRAVENOUS at 16:34

## 2017-03-29 RX ADMIN — CARVEDILOL 12.5 MG: 12.5 TABLET, FILM COATED ORAL at 08:30

## 2017-03-29 RX ADMIN — DARUNAVIR 800 MG: 800 TABLET, FILM COATED ORAL at 21:57

## 2017-03-29 RX ADMIN — TAZOBACTAM SODIUM AND PIPERACILLIN SODIUM 2.25 G: 250; 2 INJECTION, SOLUTION INTRAVENOUS at 10:14

## 2017-03-29 RX ADMIN — DAPSONE 100 MG: 25 TABLET ORAL at 21:57

## 2017-03-29 RX ADMIN — MYCOPHENOLATE MOFETIL 500 MG: 250 CAPSULE ORAL at 10:23

## 2017-03-29 RX ADMIN — ISOSORBIDE MONONITRATE 60 MG: 30 TABLET, EXTENDED RELEASE ORAL at 01:07

## 2017-03-29 RX ADMIN — DARUNAVIR 800 MG: 800 TABLET, FILM COATED ORAL at 01:08

## 2017-03-29 RX ADMIN — LEVOFLOXACIN 750 MG: 5 INJECTION, SOLUTION INTRAVENOUS at 00:56

## 2017-03-29 RX ADMIN — CALCIUM ACETATE 2001 MG: 667 CAPSULE ORAL at 12:37

## 2017-03-29 RX ADMIN — Medication 2 LOZENGE: at 12:37

## 2017-03-29 RX ADMIN — AMLODIPINE BESYLATE 5 MG: 5 TABLET ORAL at 22:13

## 2017-03-29 RX ADMIN — Medication 1 CAPSULE: at 20:58

## 2017-03-29 RX ADMIN — INSULIN GLARGINE 30 UNITS: 100 INJECTION, SOLUTION SUBCUTANEOUS at 09:06

## 2017-03-29 NOTE — PROGRESS NOTES
"[facility]  Hospitalist Progress Note  Susan Farah MD 03/29/2017    Reason for Stay (Diagnosis): high fevers and weakness         Assessment and Plan:      Summary of Stay: Donald Raza is a 67 year old male admitted on 3/28/2017   Problem List:   1. Influenza A  Sx started yesterday with fevers, chills and weakness  Myalgias and weakness continue today  Got one dose of tamiflu needs dosing after each dialysis  continue supportive care tylenol as needed  No Iv fluids due to ESRD    2. Possible infiltrate right lower lung  Not convincing on his admission chest xray  But procalcitonin is high at 4.35  So will continue zosyn and vancomycin for now    3. Hx respiratory failure with intubation for bilateral pneumonia 1/2017    4. HIV on usual medications    5. Anemia of chronic disease and ESRD  On dialysis Tues, thrus and sat  To get 2 units with dialysis tomorrow  ( has multiple antibodies and is difficult to cross match)    6. Stable CAD s/p stents and hypertension  Will continue usual medications  Coreg 12.5 mg bid  Hydralazine 25 mg bid  norvacs 5 mg daily  cozaar  100 mg at bedtime  Aspirin and lipitor      7. Diabetes  Continue lantus 50 units q am and humolog 5 units as meals   with sliding scale  Diabetic diet          Interval History (Subjective):      \" I hurt all ove, especially my legs\"                  Physical Exam:      Last Vital Signs:  /64  Pulse 73  Temp 102.3  F (39.1  C) (Oral)  Resp 20  Ht 1.803 m (5' 11\")  Wt 80.3 kg (177 lb)  SpO2 92%  BMI 24.69 kg/m2    Temp max 102.3    Constitutional: Awake, alert, cooperative, c/o weakness   Respiratory: Clear to auscultation bilaterally, no rales no wheezing  No coughing normal respiratory rate  Not labored   Cardiovascular: Regular rate and rhythm, normal S1 and S2, and no murmur noted   Abdomen: Normal bowel sounds, soft, non-distended, non-tender   Skin: No rashes, no cyanosis, dry to touch   Neuro: Alert and " oriented x3, no focal weakness, numbness, memory loss   Extremities: trace edema, normal range of motion   Other(s): C/o generalized weakness and muscle aches    No joint inflammation   All other systems: Negative  eating meals          Medications:      All current medications were reviewed with changes reflected in problem list.         Data:      All new lab and imaging data was reviewed.   Labs:  Influenza A positive by PCR  Na 135  K 4.3  Bicarb 28 creat 5.5  BUN 36  Wbc 4.2  Hemoglobin 6.5 down from 6.9  Lactic acid 2.6  Now 1.2  procalcitonin 4.35  Imaging:  Chest xray reviewed patchy alveolar infiltrate right lung base

## 2017-03-29 NOTE — PLAN OF CARE
Problem: Goal Outcome Summary  Goal: Goal Outcome Summary  Outcome: No Change  Ambulatory Status:  Pt up with Ax1, due to weakness and fatigue.  VS:  Stable, fever during shift, tylenol given.  Pain:  Denies, but has sore throat and chest from coughing throat lozenges given  Resp: LS expiratory wheezing and coarse  Cardiac: TELE: SR  GI:  denies nausea.  fair appetite and on mod carb diet.  , 217, 204.  Passing flatus.  Last BM today.  :  Makes little urine due to dialysis.   Skin:  Fistula in left arm   Tx:  Dialysis, IV abx, tamiflu, manage pain and fever  Labs:  WBC 4.2, Hgb 6.5 (will replace with dialysis tomorrow)  Consults:  Nephrology  Disposition:  TBD- will continue to monitor.

## 2017-03-29 NOTE — PHARMACY-VANCOMYCIN DOSING SERVICE
Pharmacy Vancomycin Note  Date of Service 2017  Patient's  1950   67 year old, male    Indication: [facility] Pneumonia  Goal Trough Level: 15-20 mg/L  Day of Therapy: 1   Current Vancomycin regimen:  2000 mg IV x1 dose given in ER    Current estimated CrCl = Estimated Creatinine Clearance: 17.2 mL/min (based on Cr of 4.61).    Creatinine for last 3 days  3/28/2017:  7:53 PM Creatinine 4.61 mg/dL    Recent Vancomycin Levels (past 3 days)  No results found for requested labs within last 72 hours.    Vancomycin IV Administrations (past 72 hours)                   vancomycin (VANCOCIN) 2,000 mg in NaCl 0.9 % 500 mL intermittent infusion (mg) 2,000 mg New Bag 17                Nephrotoxins and other renal medications (Future)    Start     Dose/Rate Route Frequency Ordered Stop    17 0400  piperacillin-tazobactam (ZOSYN) infusion 2.25 g      2.25 g  100 mL/hr over 30 Minutes Intravenous EVERY 6 HOURS 17 2325      17 0025  vancomycin place koenig - receiving intermittent dosing      1 each Does not apply SEE ADMIN INSTRUCTIONS 17 0025               Contrast Orders - past 72 hours     None        Renal Function: pt on dialysis    Plan:  1.  Vanco 2000mg iv x1 dose given in ER.  Pt is on dalysis.  Intermittent dosing.  2.  Pharmacy will check trough levels as appropriate in 1-3 Days.    3. Serum creatinine levels will not be ordered since pt on dialysis.    Marco Antonio Lamas        .

## 2017-03-29 NOTE — PROVIDER NOTIFICATION
"   03/29/17 0700   Significant Event   Significant Event Other (see comments)  (hgb 6.5)   Paged MD \"hgb 6.5. Please advise. Thanks. \"   "

## 2017-03-29 NOTE — PROGRESS NOTES
Assessment and Plan:   ESRD: runs [facility] at [facility]. Will run in am. Orders placed. Tx during the run.             Interval History:   Influenza  Anemia: chronic problem. Will transfuse 2 units on dialysis in am.   HIV                 Review of Systems:   C/O weakness. No SOB, not on O2. No chest or abd pain. No recent problems on dialysis.           Medications:       [START ON 3/30/2017] levofloxacin  500 mg Intravenous Q48H     vancomycin place koenig - receiving intermittent dosing  1 each Does not apply See Admin Instructions     omeprazole  40 mg Oral Daily     B complex-vitamin C-folic acid  1 tablet Oral QPM     magnesium oxide (MAG-OX) tablet 400 mg  400 mg Oral At Bedtime     losartan  100 mg Oral At Bedtime     hydrALAZINE  25 mg Oral TID     gabapentin  600 mg Oral QPM     B complex-C-folic acid  1 capsule Oral QPM     amLODIPine  5 mg Oral At Bedtime     insulin aspart  1-10 Units Subcutaneous TID AC     insulin aspart  1-7 Units Subcutaneous At Bedtime     mycophenolate  500 mg Oral BID     aspirin chewable tablet 81 mg  81 mg Oral QPM     atorvastatin  40 mg Oral At Bedtime     calcium acetate  2,001 mg Oral TID w/meals     carvedilol  12.5 mg Oral BID w/meals     clopidogrel (PLAVIX) tablet 75 mg  75 mg Oral QPM     dapsone  100 mg Oral At Bedtime     dolutegravir  50 mg Oral At Bedtime     darunavir  800 mg Oral At Bedtime     gabapentin  300 mg Oral QAM     insulin glargine  30 Units Subcutaneous QAM     insulin aspart  5 Units Subcutaneous TID AC     isosorbide mononitrate  60 mg Oral QPM     ritonavir  100 mg Oral At Bedtime     piperacillin-tazobactam  2.25 g Intravenous Q6H     abacavir  600 mg Oral QPM        Current active medications and PTA medications reviewed, see medication list for details.            Physical Exam:   Vitals were reviewed  Patient Vitals for the past 24 hrs:   BP Temp Temp src Pulse Heart Rate Resp SpO2 Height Weight   03/29/17 1158 120/55 99.6  F  "(37.6  C) Oral - 60 18 - - -   17 0952 - 99.4  F (37.4  C) Oral - - - - - -   17 0900 116/53 - - - 67 - - - -   17 0804 116/45 101.5  F (38.6  C) Oral - 78 18 (!) 83 % - -   17 0703 - 100.2  F (37.9  C) - - - - - - -   17 0356 144/62 98.4  F (36.9  C) Oral - 74 16 96 % - -   17 2300 134/63 - - - 80 16 95 % 1.803 m (5' 11\") 80.3 kg (177 lb)   17 2240 - 100.4  F (38  C) Oral - - - - - -   170 140/65 - - - 83 - 91 % - -   175 140/65 - - - 80 18 92 % - -   17 - - - - - - 92 % - -   17 2200 140/68 - - - - - - - -   17 2100 158/73 - - - - - - - -   17 2045 171/74 - - - 86 30 92 % - -   17 152/69 - - - 83 22 92 % - -   17 159/72 - - - 82 16 95 % - -   17 - - - - - - 92 % - -   17 1929 149/63 99.5  F (37.5  C) Oral 73 73 18 99 % - 88.6 kg (195 lb 5.2 oz)       Temp:  [98.4  F (36.9  C)-101.5  F (38.6  C)] 99.6  F (37.6  C)  Pulse:  [73] 73  Heart Rate:  [60-86] 60  Resp:  [16-30] 18  BP: (116-171)/(45-74) 120/55  SpO2:  [83 %-99 %] 83 %    Temperatures:  Current - Temp: 99.6  F (37.6  C); Max - Temp  Av.9  F (37.7  C)  Min: 98.4  F (36.9  C)  Max: 101.5  F (38.6  C)  Respiration range: Resp  Av.1  Min: 16  Max: 30  Pulse range: Pulse  Av  Min: 73  Max: 73  Blood pressure range: Systolic (24hrs), Av , Min:116 , Max:171   ; Diastolic (24hrs), Av, Min:45, Max:74    Pulse oximetry range: SpO2  Av.6 %  Min: 83 %  Max: 99 %           Intake/Output Summary (Last 24 hours) at 17 1326  Last data filed at 17 0900   Gross per 24 hour   Intake              120 ml   Output                0 ml   Net              120 ml       Alert, resting in bed  LAF with nice pulse and thrill  LE no edema       Wt Readings from Last 4 Encounters:   17 80.3 kg (177 lb)   17 79.9 kg (176 lb 3.2 oz)   17 82.6 kg (182 lb 1.6 oz)   16 88.9 kg (196 lb)          Data: "          Lab Results   Component Value Date     03/29/2017     03/28/2017     01/28/2017    Lab Results   Component Value Date    CHLORIDE 97 03/29/2017    CHLORIDE 95 03/28/2017    CHLORIDE 92 01/28/2017    Lab Results   Component Value Date    BUN 36 03/29/2017    BUN 29 03/28/2017    BUN 64 01/28/2017      Lab Results   Component Value Date    POTASSIUM 4.3 03/29/2017    POTASSIUM 4.0 03/28/2017    POTASSIUM 4.5 01/28/2017    Lab Results   Component Value Date    CO2 28 03/29/2017    CO2 28 03/28/2017    CO2 26 01/28/2017    Lab Results   Component Value Date    CR 5.50 03/29/2017    CR 4.61 03/28/2017    CR 7.90 01/28/2017        Recent Labs   Lab Test  03/29/17   0620  03/28/17 1953 01/28/17   0745   WBC  4.2  4.4  4.1   HGB  6.5*  6.9*  8.2*   HCT  21.3*  21.8*  25.8*   MCV  96  94  91   PLT  93*  109*  134*     Recent Labs   Lab Test  03/28/17 1953 01/17/17   0523  01/12/17   0526   07/06/14   2255   AST  23  29  66*   < >  18   ALT  29  48  152*   < >  17   ALKPHOS  120  54  88   < >  132   BILITOTAL  0.6  0.7  0.8   < >  0.5   BILICONJ   --    --    --    --   0.0    < > = values in this interval not displayed.       Recent Labs   Lab Test  01/25/17   1242  01/20/17   0510  01/18/17   0630   MAG  2.1  2.7*  2.3     Recent Labs   Lab Test  01/21/17   0558  01/16/17   0450  01/08/17   0636   PHOS  4.6*  7.1*  5.1*     Recent Labs   Lab Test  03/29/17   0620  03/28/17 1953 01/28/17   0745   PRABHA  8.6  9.0  8.5       Lab Results   Component Value Date    PRABHA 8.6 03/29/2017     Lab Results   Component Value Date    WBC 4.2 03/29/2017    HGB 6.5 (LL) 03/29/2017    HCT 21.3 (L) 03/29/2017    MCV 96 03/29/2017    PLT 93 (L) 03/29/2017     Lab Results   Component Value Date     03/29/2017    POTASSIUM 4.3 03/29/2017    CHLORIDE 97 03/29/2017    CO2 28 03/29/2017     (H) 03/29/2017     Lab Results   Component Value Date    BUN 36 (H) 03/29/2017    CR 5.50 (H) 03/29/2017     Lab  Results   Component Value Date    MAG 2.1 01/25/2017     Lab Results   Component Value Date    PHOS 4.6 (H) 01/21/2017       Creatinine   Date Value Ref Range Status   03/29/2017 5.50 (H) 0.66 - 1.25 mg/dL Final   03/28/2017 4.61 (H) 0.66 - 1.25 mg/dL Final   01/28/2017 7.90 (H) 0.66 - 1.25 mg/dL Final   01/27/2017 5.94 (H) 0.66 - 1.25 mg/dL Final   01/26/2017 9.71 (H) 0.66 - 1.25 mg/dL Final   01/25/2017 7.34 (H) 0.66 - 1.25 mg/dL Final       Attestation:  I have reviewed today's vital signs, notes, medications, labs and imaging.     Eric Bradford MD

## 2017-03-29 NOTE — PHARMACY-ADMISSION MEDICATION HISTORY
Admission medication history interview status for this patient is complete. See [facility] admission navigator for allergy information, prior to admission medications and immunization status.     Medication history interview source(s):Patient  Medication history resources (including written lists, pill bottles, clinic record): Care Everywhere, Discharge summary on 1/29/17.  Primary pharmacy:     Changes made to PTA medication list:  Added:  Nepro supplemnts, Prilosec, Nephrocaps  Deleted:  Fish oil  Changed:  None    Actions taken by pharmacist (provider contacted, etc):None     Additional medication history information:None    Medication reconciliation/reorder completed by provider prior to medication history? No    For patients on insulin therapy: No       Prior to Admission medications    Medication Sig Last Dose Taking? Auth Provider   Nutritional Supplements (NEPRO PO) 1-2 times daily 3/27/2017 at   Yes Unknown, Entered By History   omeprazole (PRILOSEC) 40 MG capsule Take 40 mg by mouth daily 1 hour before dinner 3/27/2017 at pm Yes Unknown, Entered By History   B complex-C-folic acid (NEPHROCAPS/TRIPHROCAPS) 1 MG capsule Take 1 capsule by mouth every evening 3/27/2017 at pm Yes Unknown, Entered By History   insulin lispro (HUMALOG KWIKPEN) 100 UNIT/ML injection Inject 5 Units Subcutaneous 3 times daily (before meals) 3/27/2017 at pm Yes Lorna Jhaveri MD   insulin lispro (HUMALOG KWIKPEN) 100 UNIT/ML injection Inject Subcutaneous 3 times daily (before meals) Sliding scale - 2 units per 50 over 150 3/27/2017 at pm Yes Unknown, Entered By History   Multiple Vitamins-Minerals (DIALYVITE 800/ULTRA D) TABS Take 1 tablet by mouth daily 3/27/2017 at pm Yes Unknown, Entered By History   mycophenolate (CELLCEPT - GENERIC EQUIVALENT) 500 MG tablet Take 500 mg by mouth 2 times daily On an empty stomach 3/27/2017 at pm Yes Reported, Patient   isosorbide mononitrate (IMDUR) 30 MG 24 hr tablet Take 2 tablets (60 mg) by  mouth every evening 3/27/2017 at pm Yes Glenna Fernández MD   hydrALAZINE (APRESOLINE) 50 MG tablet Take 0.5 tablets (25 mg) by mouth 3 times daily 3/27/2017 at pm Yes Glenna Fernández MD   losartan (COZAAR) 100 MG tablet Take 1 tablet (100 mg) by mouth At Bedtime 3/27/2017 at pm Yes Glnena Fernández MD   carvedilol (COREG) 12.5 MG tablet Take 1 tablet (12.5 mg) by mouth 2 times daily (with meals) 3/27/2017 at  pm Yes Glenna Fernández MD   amLODIPine (NORVASC) 5 MG tablet Take 1 tablet (5 mg) by mouth At Bedtime 3/27/2017 at  pm Yes Glenna Fernández MD   gabapentin (NEURONTIN) 300 MG capsule Take 600 mg by mouth every evening 3/27/2017 at pm Yes Unknown, Entered By History   insulin glargine (LANTUS) 100 UNIT/ML PEN Inject 50 Units Subcutaneous every morning 3/27/2017 at am Yes Unknown, Entered By History   ASPIRIN PO Take 81 mg by mouth every evening  3/27/2017 at  pm Yes Unknown, Entered By History   CLOPIDOGREL BISULFATE PO Take 75 mg by mouth every evening  3/27/2017 at  pm Yes Unknown, Entered By History   abacavir (ZIAGEN) 300 MG tablet Take 600 mg by mouth every evening  3/27/2017 at  pm Yes Abstract, Provider   calcium acetate (PHOSLO) 667 MG CAPS 2,001 mg 3 times daily (with meals) Take 3 capsules three times a day with meals 3/27/2017 at  pm Yes Abstract, Provider   chlorhexidine (CHLORHEXIDINE) 0.12 % solution Rinse and gargle 15 ml by mouth twice a day as directed. 3/27/2017 at  pm Yes Abstract, Provider   atorvastatin (LIPITOR) 40 MG tablet Take 40 mg by mouth At Bedtime 3/27/2017 at  pm Yes Unknown, Entered By History   MAGNESIUM OXIDE PO Take 400 mg by mouth At Bedtime 3/27/2017 at pm Yes Unknown, Entered By History   dolutegravir (TIVICAY) 50 MG tablet Take 50 mg by mouth At Bedtime 3/27/2017 at  pm Yes Unknown, Entered By History   gabapentin (NEURONTIN) 300 MG capsule Take 300 mg by mouth every morning  3/27/2017 at am Yes Unknown, Entered By History   dapsone 100 MG tablet Take 100 mg by mouth At Bedtime   "3/27/2017 at  pm Yes Reported, Patient   Darunavir Ethanolate (PREZISTA PO) Take 800 mg by mouth At Bedtime. 3/27/2017 at  pm Yes Reported, Patient   ritonavir (NORVIR) 100 MG capsule Take 1 capsule by mouth At Bedtime  3/27/2017 at pm Yes Reported, Patient   order for DME Equipment being ordered: Other: 4WW  Treatment Diagnosis: Decreased activity tolerance   Lorna Jhaveri MD   nystatin (MYCOSTATIN) cream Apply topically daily as needed for dry skin prn  Reported, Patient   imiquimod (ALDARA) 5 % cream Apply topically as needed prn  Reported, Patient   DOXERCALCIFEROL IV Inject 6 mcg into the vein three times a week   Reported, Patient   CLONAZEPAM PO Take 0.5 mg by mouth nightly as needed for anxiety (restless legs) prn  Unknown, Entered By History   hydrocortisone (ANUCORT-HC) 25 MG suppository Place 25 mg rectally 2 times daily as needed  prn  Abstract, Provider   terbinafine (LAMISIL AT) 1 % cream Apply topically 2 times daily as needed  prn  Abstract, Provider   epoetin brittni (EPOGEN,PROCRIT) 08816 UNIT/ML injection Inject 11,000 Units Subcutaneous three times a week WITH DIALYSIS   Unknown, Entered By History   iron sucrose (VENOFER) 20 MG/ML injection Inject 50 mg into the vein once a week WIth dialysis   Unknown, Entered By History   nitroglycerin (NITROSTAT) 0.4 MG SL tablet One tablet under the tongue every 5 minutes if needed for chest pain. May repeat every 5 minutes for a maximum of 3 doses in 15 minutes\" prn  Lay Paz MD           "

## 2017-03-29 NOTE — PLAN OF CARE
Problem: Goal Outcome Summary  Goal: Goal Outcome Summary  Outcome: No Change  A&Ox4. Tmax 100.0, other VSS. Tele SR.  and 153. Left fistula bruit and thrill noted. Updated MD of positive Influenza A, will give tamiflu as ordered. IV Zosyn, Levaquin and Vanco given for PNA. FRANCO, SOB. Lung coarse, ex. Wheezes. Mostly non-productive, intermittent cough remains. Attempted sputum sent to lab, cancelled do to oral contaminant. Placed new sputum order. A1 to transfer, increased weakness. PRN IV Zofran given for nausea with relief. Slept well between cares.

## 2017-03-29 NOTE — ED NOTES
Pt was at dialysis today and began to feel dizzy afterwards, vomiting and weakness along with cough. Last dose Ibuprofen: 1830. ABC's intact, alert and oriented x3.

## 2017-03-29 NOTE — PROGRESS NOTES
H+P dictated. Admitted with HCAP, on broad spectrum ABx. I am unsure why he is on cellcept so I have held it.

## 2017-03-29 NOTE — PROGRESS NOTES
Infection Prevention:    Patient requires Droplet precautions because of Influenza. Please contact Infection Prevention with any questions/concerns at *[phone].    Margaret De Jesus, ICP

## 2017-03-29 NOTE — ED PROVIDER NOTES
History   Chief Complaint:  Fever     HPI   Donald Raza is a 66 year old male with a history of CKD on dialysis T,TH,S, diabetes, hypertension, HIV, NSTEMI, significant for multiple MI and CABG, prostate cancer and colon cancer who presents to the emergency department today for evaluation of a fever. Patient indicates that he came home from dialysis and began having chills, fevers and abdominal pain. His daughter reports that her daughter had the flu and she was around the patient. He was given a dose of Ibuprofen around 1830 without significant relief. Here he notes that the he felt dizzy and had one episode of vomiting. He denies any to other complaints at this time.      Allergies:  Lisinopril   Sulfa Drugs      Medications:    Insulin   Multiple Vitamins Minerals   Cellcept  Omega 3 Fish  Imdur  Appresoline  Cozaar   Coreg  Norvasc  Gabapentin   Lantus   Mycostatin   Aspirin  Clonazepam   Clopidogrel Bisulfate  Ziagen   Phoslol   Chlorhexidine   Anucort   Lamisil   Lipitor   Epoetin Dwayne  Venofer  Magnesium oxide  Tivicay   Nitroglycerin   Dapsone   Prezista  Norvir     Past Medical History:    Type 2 diabetes mellitus   HIV   Allergic rhinitis  Impotence of organic origin   Huang disease  Hypertension   CKD   Mixed hyperlipidemia   CAD  NSTEMI   Pulmonary hypertension   Near syncope   TIA  Increased prostate specific antigen velocity   Bilateral pneumonia   Prostate cancer  Colon cancer      Past Surgical History:   Cholecystectomy, laporoscopic   Colostomy (1999)   Appendectomy   Angiogram (2000, 2016)   Stent, coronary (2015 x2)  Heart cath, angioplasty (2016)      Family History:   Brother - Heart Disease, CABG, Dilated Aorta  Sister - Kidney Disease, Hypertension      Social History:  The patient was accompanied to the ED by his daughter.  Smoking Status: Former Smoker  Alcohol Use: Negative  Marital Status:     Review of Systems   Constitutional: Positive for chills and fever.   Respiratory:  Positive for cough.    Gastrointestinal: Positive for vomiting.   Neurological: Positive for dizziness and weakness.   All other systems reviewed and are negative.    Physical Exam   First Vitals:  BP: 149/63  Pulse: 73  Heart Rate: 73  Temp: 99.5  F (37.5  C)  Resp: 18  Weight: 88.6 kg (195 lb 5.2 oz)  SpO2: 99 %    Physical Exam  Vital signs and nursing notes reviewed.     Constitutional: laying on gurney appears mildly uncomfortable  HENT: Oropharynx is clear and dry  Eyes: Conjunctivae are normal bilaterally. Pupils equal  Neck: normal range of motion  Cardiovascular: Normal rate, regular rhythm, normal heart sounds.   Pulmonary/Chest: Congested cough, no wheezing or rales. No respiratory distress.   Abdominal: Soft. Bowel sounds are normal. No tenderness to palpation. No rebound or guarding.   Musculoskeletal: No joint swelling or edema. AV fistula left arm   Neurological: Alert and oriented. No focal weakness  Skin: Skin is warm and dry. No rash noted.   Psych: normal affect    Emergency Department Course   Imaging:  Radiographic findings were communicated with the patient who voiced understanding of the findings.  XR Chest 2 views  Alveolar infiltrates in the right lower lobe and  questionably in left lower lobe consistent with pneumonia. Normal  heart size and pulmonary vascularity. No pleural effusions. As per radiology.     Laboratory:  UA with micro: urine glc>499, pH 9.0(H), protein albumin >499 o/w negative  Urine culture pending  ISTAT gases: Ph 7.54(H), PCO2 35(L), bicarbonate 30(H), lactic acid 2.6(H)  CBC: WBC: 4.4, HGB: 6.9(L), PLT: 109(L)  CMP: Creatinine: 4.61(H), Glucose 303(H), GFR 13(L), GFR 15(L), o/w WNL   Blood culture: pending   Influenza: nasal swab negative    Interventions:  2019 Tylenol 500mg PO  2153 Zosyn 2.25g IV  2154 Vancocin 2g IV  2235 Levaquin 500mg IV    Emergency Department Course:  Nursing notes and vitals reviewed. I performed an exam of the patient as documented above.      The patient provided a urine sample here in the emergency department. This was sent for laboratory testing, findings above.     Blood drawn. This was sent to the lab for further testing, results above.    The patient was sent for x-ray while in the emergency department, findings above.     2139 I consulted with Dr. Diaz, hospitalist services     Findings and plan explained to the Patient who consents to admission. Discussed the patient with Dr. Diaz, who will admit the patient to a medical bed for further monitoring, evaluation, and treatment.    Impression & Plan    CMS Diagnoses:   The patient has signs of Severe Sepsis as evidenced by:    1. 2 SIRS criteria, AND  2. Suspected infection, AND   3. Organ dysfunction: Lactic Acid >2    Time severe sepsis present = present on arrival      3 Hour Severe Sepsis Bundle Completion:  1. Initial Lactic Acid Result:   Recent Labs   Lab Test  03/28/17 1955 01/25/17   2305  01/07/17   0530   LACT  2.6*  0.9  0.9     2. Blood Cultures before Antibiotics: Yes  3. Broad Spectrum Antibiotics Administered: Yes     Anti-infectives (Future)    Start     Dose/Rate Route Frequency Ordered Stop    03/28/17 2156  levofloxacin (LEVAQUIN) infusion 500 mg      500 mg  100 mL/hr over 60 Minutes Intravenous ONCE 03/28/17 2155          4. 0 ml of IV fluids.    the patient was transferred out of the ED prior to the 6 hour zuleika.     Medical Decision Making:  Donald Raza is a 67 year old male who presents with fairly acute onset of fever, chills, nausea, vomiting, cough and some vague lower rib pain. On examination, he had normal vital signs, he had occasional congested sounding cough without or signs of respiratory distress. He has no exam findings to suggest intraabdominal pathology. His lactic acid returned at 2.5 consistent with infectious etiology that could represent severe sepsis. Lab tests were obtained and chest x-ray showed findings consistent with pneumonia. Based on his  recent admission to the hospital and dialysis he meets criteria for healthcare acquired pneumonia. He was given agressive antibiotic treatment initially including Zosyn, Vancomycin and hospitalist wanted extra coverage with Levaquin which was added. I reviewed all the results with the patient and and family an they are agreeable with the plan. Off note, patient has chronic anemia today his hemoglobin is 6.9 and he did his dialysis course and therefore would not require emergent dialysis.     Diagnosis:    ICD-10-CM   1. Healthcare-associated pneumonia J18.9   2. Anemia, unspecified type D64.9   3. Human immunodeficiency virus (HIV) disease (H) B20   4. Severe sepsis (H) A41.9    R65.20     Maulik Said  3/28/2017   [facility] EMERGENCY DEPARTMENT    I, Maulik Said, am serving as a scribe on 3/28/2017 at 7:37 PM to personally document services performed by Jose Michaud MD based on my observations and the provider's statements to me.        Jose Michaud MD  03/29/17 1652

## 2017-03-29 NOTE — H&P
CHIEF COMPLAINT:  Dizzy, cough, fever, weakness of 1 day's duration.      HISTORY OF PRESENT ILLNESS:  Donald Raza is a 67-year-old white male with a complicated history to include end-stage renal disease on hemodialysis Tuesdays, Thursdays and Saturdays, history of colon cancer, status post hemicolectomy, coronary artery disease, status post PCI to his LAD, hyperlipidemia, hypertension, HIV with most recent CD4 count of 184 on 03/08/2017 who went today for dialysis.  He was feeling somewhat weak before he went for dialysis but during dialysis his blood pressure dipped down.  He was noted to have chills after he finished his session.  He has been having a cough which is mainly dry in nature.  He has been feeling quite weak such that he has been requiring help to get around.  He also endorses a headache which was present when he went for dialysis, which is somewhat better after he got some Tylenol during dialysis.  Of note, he has been in contact with his granddaughter who was diagnosed with influenza.  He also complains of myalgias.  In the ER over here, he was seen by Dr. Michaud.  A chest x-ray showed a pneumonia and I am asked to admit him.      PAST MEDICAL HISTORY:  Type 2 diabetes, prior history of transient ischemic attack, non-ST elevation myocardial infarction with PCI, end-stage renal disease on hemodialysis Tuesdays, Thursdays and Saturdays, coronary artery disease, status post PCI, hyperlipidemia, hypertension and a prolonged hospitalization here from 01/03 to 01/22 for healthcare associated pneumonia, whereby he was intubated and had a code blue.      PAST SURGICAL HISTORY:  Significant for multiple stent placements in his coronaries, laparoscopic cholecystectomy, appendectomy.      FAMILY HISTORY:  Significant for kidney disease in his sister.      SOCIAL HISTORY:  He does not smoke.  He quit smoking in 2002.  He does not drink alcohol regularly.      ALLERGIES:  Allergic to LISINOPRIL as well as  SULFA.      HOME MEDICATION LIST:  Awaiting reconciliation, but includes insulin, Neurontin, Lantus, Plavix, Lipitor as well as antiretroviral for HIV.      REVIEW OF SYSTEMS:  As mentioned in the HPI.  All other systems are reviewed and deemed unremarkable and negative.  He does endorse shortness of breath, otherwise.  He did have episodes of emesis which were nonbloody.  He denies any abdominal pain or diarrhea.      PHYSICAL EXAMINATION:   VITAL SIGNS:  Temperature is 99.5, pulse 73, blood pressure is 158/73, respiratory rate 30, O2 sat is 92% on room air.   GENERAL:  The patient is alert, awake, oriented, coherent, accompanied by his daughter as well as his brother in no acute distress.   HEENT:  Pupils equal, round, react to light.  Pharynx, there is no exudate noted.   LUNGS:  He has crackles in the bases bilaterally, left greater than right.   HEART:  Regular rate, S1, S2 normal, 2/6 systolic murmur.   ABDOMEN:  Soft, nontender, with good bowel sounds.   EXTREMITIES:  There is no edema.  He has an AV fistula in his left upper extremity which has a thrill.   NEUROLOGIC:  His cranial nerves II-XII are grossly within normal limits.  Moves all his extremities.      LABORATORY:  Labwork here obtained include a sodium of 135, potassium 4.0, chloride 95, bicarbonate 28, BUN 29, creatinine 4.61, GFR of 13, calcium 9.0, anion gap 12, albumin 3.6, total protein 7.6, total bilirubin 0.6, alkaline phosphatase 120.  LFTs within normal limits.  Lactic acid is 2.66, glucose level is 303.  Venous blood gas showed a pH of 7.54, pCO2 of 35, pO2 of 36, bicarbonate of 30.  On his CBC, his white cell count is 4.4, hemoglobin 6.9, hematocrit 31.8, platelet count 109,000.  His diff is grossly within normal limits.  Influenza screen carried out here in the ER is negative.  A urinalysis does not show any evidence of a UTI.  Blood cultures obtained in the ER are pending.  A chest x-ray, 2 views, shows alveolar infiltrates in the right  lower lobe and questionably in the left lower lobe consistent with pneumonia.  Normal heart size and pulmonary vascularity, no pleural effusions.      ASSESSMENT AND PLAN:   1.  Healthcare-associated pneumonia, by definition, given the fact that he is on hemodialysis.  He has been initiated on broad-spectrum antibiotics over here to include vancomycin, Zosyn and Levaquin, which I will continue.  However, I will recheck him for influenza screen as his symptoms are possibly suggestive of influenza.   2.  Acute on chronic anemia.  He has been undergoing workup for this to include an upper endoscopy, which he had recently in February of this year which was nondiagnostic.  He is due to get blood transfusion this Thursday with dialysis.  His daughter and the patient himself did not want any form of blood at present as he has reacted adversely to blood when he gets a transfusion without dialysis, hence, we will hold off on blood transfusion at this point in time.   3.  End-stage renal disease on hemodialysis ,  and .  Will consult Nephrology   4.  Diabetes.  Will place him on Accu-Cheks along with insulin.   5.  HIV positive or AIDS (acquired immune deficiency syndrome.  Will resume his antiretrovirals.      CODE STATUS:  Full code.      He will be admitted as an inpatient.         REJI PAYTON MD             D: 2017 22:18   T: 2017 22:58   MT: [id number]      Name:     GREGORIO BRUSH   MRN:      [id number]        Account:      [id number]   :      1950           Admitted:     [id number]      Document: [id number]

## 2017-03-30 LAB
ABO + RH BLD: NORMAL
ABO + RH BLD: NORMAL
BLD GP AB SCN SERPL QL: NORMAL
BLD PROD TYP BPU: NORMAL
BLD UNIT ID BPU: 0
BLD UNIT ID BPU: 0
BLOOD BANK CMNT PATIENT-IMP: NORMAL
BLOOD PRODUCT CODE: NORMAL
BLOOD PRODUCT CODE: NORMAL
BPU ID: NORMAL
BPU ID: NORMAL
GLUCOSE BLDC GLUCOMTR-MCNC: 101 MG/DL (ref 70–99)
GLUCOSE BLDC GLUCOMTR-MCNC: 143 MG/DL (ref 70–99)
GLUCOSE BLDC GLUCOMTR-MCNC: 165 MG/DL (ref 70–99)
GLUCOSE BLDC GLUCOMTR-MCNC: 171 MG/DL (ref 70–99)
GLUCOSE BLDC GLUCOMTR-MCNC: 270 MG/DL (ref 70–99)
GRAM STN SPEC: NORMAL
Lab: NORMAL
MICRO REPORT STATUS: NORMAL
NUM BPU REQUESTED: 2
SPECIMEN EXP DATE BLD: NORMAL
SPECIMEN SOURCE: NORMAL
TRANSFUSION STATUS PATIENT QL: NORMAL
TROPONIN I SERPL-MCNC: 0.02 UG/L (ref 0–0.04)
TROPONIN I SERPL-MCNC: 0.02 UG/L (ref 0–0.04)
VANCOMYCIN SERPL-MCNC: 20 MG/L

## 2017-03-30 PROCEDURE — 25000125 ZZHC RX 250: Performed by: INTERNAL MEDICINE

## 2017-03-30 PROCEDURE — A9270 NON-COVERED ITEM OR SERVICE: HCPCS | Mod: GY | Performed by: INTERNAL MEDICINE

## 2017-03-30 PROCEDURE — 00000146 ZZHCL STATISTIC GLUCOSE BY METER IP

## 2017-03-30 PROCEDURE — 25000131 ZZH RX MED GY IP 250 OP 636 PS 637: Mod: GY

## 2017-03-30 PROCEDURE — 25000132 ZZH RX MED GY IP 250 OP 250 PS 637: Mod: GY | Performed by: INTERNAL MEDICINE

## 2017-03-30 PROCEDURE — 86850 RBC ANTIBODY SCREEN: CPT | Performed by: HOSPITALIST

## 2017-03-30 PROCEDURE — 87070 CULTURE OTHR SPECIMN AEROBIC: CPT | Performed by: INTERNAL MEDICINE

## 2017-03-30 PROCEDURE — 36415 COLL VENOUS BLD VENIPUNCTURE: CPT | Performed by: HOSPITALIST

## 2017-03-30 PROCEDURE — 5A1D60Z PERFORMANCE OF URINARY FILTRATION, MULTIPLE: ICD-10-PCS | Performed by: INTERNAL MEDICINE

## 2017-03-30 PROCEDURE — 25000128 H RX IP 250 OP 636: Performed by: INTERNAL MEDICINE

## 2017-03-30 PROCEDURE — 36415 COLL VENOUS BLD VENIPUNCTURE: CPT | Performed by: INTERNAL MEDICINE

## 2017-03-30 PROCEDURE — 93005 ELECTROCARDIOGRAM TRACING: CPT

## 2017-03-30 PROCEDURE — 87205 SMEAR GRAM STAIN: CPT | Performed by: INTERNAL MEDICINE

## 2017-03-30 PROCEDURE — 90937 HEMODIALYSIS REPEATED EVAL: CPT

## 2017-03-30 PROCEDURE — 80202 ASSAY OF VANCOMYCIN: CPT | Performed by: INTERNAL MEDICINE

## 2017-03-30 PROCEDURE — P9040 RBC LEUKOREDUCED IRRADIATED: HCPCS | Performed by: HOSPITALIST

## 2017-03-30 PROCEDURE — 93010 ELECTROCARDIOGRAM REPORT: CPT | Performed by: INTERNAL MEDICINE

## 2017-03-30 PROCEDURE — 12000007 ZZH R&B INTERMEDIATE

## 2017-03-30 PROCEDURE — 40000275 ZZH STATISTIC RCP TIME EA 10 MIN

## 2017-03-30 PROCEDURE — 86901 BLOOD TYPING SEROLOGIC RH(D): CPT | Performed by: HOSPITALIST

## 2017-03-30 PROCEDURE — 86900 BLOOD TYPING SEROLOGIC ABO: CPT | Performed by: HOSPITALIST

## 2017-03-30 PROCEDURE — 99233 SBSQ HOSP IP/OBS HIGH 50: CPT | Performed by: HOSPITALIST

## 2017-03-30 PROCEDURE — 84484 ASSAY OF TROPONIN QUANT: CPT | Performed by: HOSPITALIST

## 2017-03-30 PROCEDURE — 63400005 ZZH RX 634: Performed by: INTERNAL MEDICINE

## 2017-03-30 PROCEDURE — 86923 COMPATIBILITY TEST ELECTRIC: CPT | Performed by: HOSPITALIST

## 2017-03-30 RX ORDER — HEPARIN SODIUM 1000 [USP'U]/ML
500 INJECTION, SOLUTION INTRAVENOUS; SUBCUTANEOUS CONTINUOUS
Status: DISCONTINUED | OUTPATIENT
Start: 2017-03-30 | End: 2017-03-30

## 2017-03-30 RX ORDER — ALBUMIN (HUMAN) 12.5 G/50ML
50 SOLUTION INTRAVENOUS
Status: DISCONTINUED | OUTPATIENT
Start: 2017-03-30 | End: 2017-03-30

## 2017-03-30 RX ORDER — HEPARIN SODIUM 1000 [USP'U]/ML
500 INJECTION, SOLUTION INTRAVENOUS; SUBCUTANEOUS
Status: COMPLETED | OUTPATIENT
Start: 2017-03-30 | End: 2017-03-30

## 2017-03-30 RX ADMIN — ABACAVIR 600 MG: 300 TABLET, FILM COATED ORAL at 20:04

## 2017-03-30 RX ADMIN — CLOPIDOGREL 75 MG: 75 TABLET, FILM COATED ORAL at 20:03

## 2017-03-30 RX ADMIN — VANCOMYCIN HYDROCHLORIDE 750 MG: 1 INJECTION, POWDER, LYOPHILIZED, FOR SOLUTION INTRAVENOUS at 17:13

## 2017-03-30 RX ADMIN — CARVEDILOL 12.5 MG: 12.5 TABLET, FILM COATED ORAL at 17:14

## 2017-03-30 RX ADMIN — MYCOPHENOLATE MOFETIL 500 MG: 250 CAPSULE ORAL at 21:56

## 2017-03-30 RX ADMIN — SODIUM CHLORIDE 250 ML: 9 INJECTION, SOLUTION INTRAVENOUS at 08:27

## 2017-03-30 RX ADMIN — SODIUM CHLORIDE 1000 ML: 9 INJECTION, SOLUTION INTRAVENOUS at 08:27

## 2017-03-30 RX ADMIN — ISOSORBIDE MONONITRATE 60 MG: 30 TABLET, EXTENDED RELEASE ORAL at 20:04

## 2017-03-30 RX ADMIN — Medication 1 CAPSULE: at 20:03

## 2017-03-30 RX ADMIN — CALCIUM ACETATE 2001 MG: 667 CAPSULE ORAL at 15:08

## 2017-03-30 RX ADMIN — HYDRALAZINE HYDROCHLORIDE 25 MG: 25 TABLET ORAL at 16:22

## 2017-03-30 RX ADMIN — RITONAVIR 100 MG: 100 TABLET, FILM COATED ORAL at 21:56

## 2017-03-30 RX ADMIN — ATORVASTATIN CALCIUM 40 MG: 40 TABLET, FILM COATED ORAL at 21:56

## 2017-03-30 RX ADMIN — MYCOPHENOLATE MOFETIL 500 MG: 250 CAPSULE ORAL at 15:14

## 2017-03-30 RX ADMIN — GABAPENTIN 300 MG: 300 CAPSULE ORAL at 15:13

## 2017-03-30 RX ADMIN — TAZOBACTAM SODIUM AND PIPERACILLIN SODIUM 2.25 G: 250; 2 INJECTION, SOLUTION INTRAVENOUS at 03:25

## 2017-03-30 RX ADMIN — MAGNESIUM OXIDE TAB 400 MG (241.3 MG ELEMENTAL MG) 400 MG: 400 (241.3 MG) TAB at 21:56

## 2017-03-30 RX ADMIN — LOSARTAN POTASSIUM 100 MG: 100 TABLET, FILM COATED ORAL at 21:56

## 2017-03-30 RX ADMIN — OMEPRAZOLE 40 MG: 20 CAPSULE, DELAYED RELEASE ORAL at 15:14

## 2017-03-30 RX ADMIN — OSELTAMIVIR PHOSPHATE 30 MG: 30 CAPSULE ORAL at 17:13

## 2017-03-30 RX ADMIN — Medication 2 LOZENGE: at 02:21

## 2017-03-30 RX ADMIN — GABAPENTIN 600 MG: 300 CAPSULE ORAL at 20:03

## 2017-03-30 RX ADMIN — AMLODIPINE BESYLATE 5 MG: 5 TABLET ORAL at 21:56

## 2017-03-30 RX ADMIN — ACETAMINOPHEN 650 MG: 325 TABLET, FILM COATED ORAL at 16:22

## 2017-03-30 RX ADMIN — HEPARIN SODIUM 500 UNITS: 1000 INJECTION, SOLUTION INTRAVENOUS; SUBCUTANEOUS at 08:27

## 2017-03-30 RX ADMIN — EPOETIN ALFA 8000 UNITS: 4000 SOLUTION INTRAVENOUS; SUBCUTANEOUS at 09:12

## 2017-03-30 RX ADMIN — DAPSONE 100 MG: 25 TABLET ORAL at 21:55

## 2017-03-30 RX ADMIN — DOLUTEGRAVIR SODIUM 50 MG: 50 TABLET, FILM COATED ORAL at 21:56

## 2017-03-30 RX ADMIN — HEPARIN SODIUM 500 UNITS/HR: 1000 INJECTION, SOLUTION INTRAVENOUS; SUBCUTANEOUS at 08:28

## 2017-03-30 RX ADMIN — ASPIRIN 81 MG CHEWABLE TABLET 81 MG: 81 TABLET CHEWABLE at 20:03

## 2017-03-30 RX ADMIN — DARUNAVIR 800 MG: 800 TABLET, FILM COATED ORAL at 21:56

## 2017-03-30 RX ADMIN — TAZOBACTAM SODIUM AND PIPERACILLIN SODIUM 2.25 G: 250; 2 INJECTION, SOLUTION INTRAVENOUS at 22:04

## 2017-03-30 RX ADMIN — TAZOBACTAM SODIUM AND PIPERACILLIN SODIUM 2.25 G: 250; 2 INJECTION, SOLUTION INTRAVENOUS at 16:22

## 2017-03-30 RX ADMIN — CALCIUM ACETATE 2001 MG: 667 CAPSULE ORAL at 17:51

## 2017-03-30 RX ADMIN — Medication 1 MG: at 21:55

## 2017-03-30 RX ADMIN — HYDRALAZINE HYDROCHLORIDE 25 MG: 25 TABLET ORAL at 21:56

## 2017-03-30 NOTE — PLAN OF CARE
Problem: Goal Outcome Summary  Goal: Goal Outcome Summary  Outcome: Improving  A&O, up with A-1, LS coarse with expiratory wheezes, Tele SR, Left fistula, IV SL, Tamiflu for Influenza A positive, hgb 6.5, plan to have 2 units tomorrow with HD, Tylenol for T max 102.3, is now at 99.5, denies pain, , 167, Nephro following, will continue with POC.

## 2017-03-30 NOTE — CONSULTS
INFECTIOUS DISEASE CONSULTATION       PRIMARY CARE PHYSICIAN:  Dr. Aida Thomas.       REFERRING PHYSICIAN:  Dr. Joshua Diaz.       IMPRESSION:   1.  A 67-year-old male, complicated medical history, admitted with acute respiratory symptoms found to be influenza A positive, almost certainly that is the primary diagnosis, some infiltrate, certainly at risk for secondary pneumonia, being treated as such.   2.  History of human immunodeficiency virus infection, without major opportunistic infections controlled disease, most recent T cells somewhat low at 184, but not likely to have an opportunistic infection.   3.  Prior hospitalization in January with chest pain and a history of coronary disease.   4.  Nosocomial pneumonia as a complication of the above hospitalization including intubation in January, respiratory status stable since.   5.  Diabetes mellitus.   6.  End-stage renal disease on chronic dialysis.   7.  Sulfa allergy.   8.  History of nephrolithiasis.      RECOMMENDATIONS:   1.  Treat influenza with Tamiflu at renal adjusted dose.   2.  For now vancomycin and Zosyn as though possible secondary pneumonia.   3.  Follow clinically, probably not prolonged antibiotics needed here depending on clinical response; hold on a bigger workup for now.   4.  Continue HIV meds as previously.      HISTORY:  Donald Raza is a 67-year-old male is well known to me from prior admissions including 01/2017.  The patient has a history of underlying HIV infection, which he has had for several years, well controlled on a multidrug regimen at [facility].  He was hospitalized in 01/2017 at which time he had chest pain, has underlying coronary disease but did not seem to have major coronary event during that hospital stay.  It was complicated by pneumonia, felt to be health care-associated pneumonia, had no positive cultures, but doubt got extended antibiotics; actually was intubated for a while, subsequently improved and was  discharged.  Since his discharge, he was doing relatively well, he had recent followup with his HIV with the T cells at 184 (have normally been in the 200s), but has had undetectable virus and no signs of opportunistic infection for many years.      He had onset that coordinated with one of his dialysis runs of shaking chills, fever and increasing respiratory symptoms at presentation.  Influenza PCR is positive for influenza A.  Infiltrate seen on chest x-ray, admitted on antibiotics and started on Tamiflu.  He has not been around anyone else ill, did get his flu shot this year.  No other focal or localizing symptoms.      PAST MEDICAL HISTORY:  Long and complicated; history of diabetes, history of coronary artery disease, history of end-stage renal disease on dialysis, history of HIV infection that has been fairly well controlled on a multidrug regimen.      ALLERGIES:  Sulfa.      SOCIAL AND FAMILY HISTORY:  Lives independently, but extensive health care contact.  Family history of kidney disease.      REVIEW OF SYSTEMS:  Largely as listed above, coughing, somewhat short of breath, no major upper respiratory symptoms, definite achiness, malaise, fatigue.      PHYSICAL EXAMINATION:   GENERAL:  The patient appears his usual self.  His cognition is relatively intact.  No significant fever at present, but was 102 degrees earlier.  O2 sats in the low 90s on 2 liters nasal cannula.   HEENT:  No thrush or intraoral lesions.  Pupils reactive.  No facial skin rashes.  Eye movement normal.   NECK:  Supple and nontender without lymphadenopathy.   HEART:  Regular rhythm, no significant murmur, although there is a background 2/6 systolic flow murmur.   ABDOMEN:  Soft, nontender.   EXTREMITIES:  No rash or skin lesions.      LABORATORY:  White count in the normal range.  Chest x-ray with possible infiltrate.  Lactic acid 2.6.  Influenza screen positive.      Thank you very much for consultation.  I will follow the patient with  you.         ASHOK MI MD             D: 2017 13:21   T: 2017 13:35   MT: [id number]      Name:     GREGORIO BRUSH   MRN:      [id number]        Account:       [id number]   :      1950           Consult Date:  2017      Document: [id number]       cc: Joshua Thomas MD

## 2017-03-30 NOTE — PLAN OF CARE
Problem: Individualization  Goal: Patient Preferences  Outcome: No Change  HD done today, dialysis site intact. 2 units of PRBCs done in HD. Patient did not eat breakfast ate 100% at lunch. Loose BM x 2. Lungs coarse with expiratory wheezes. Denies shortness of breath.

## 2017-03-30 NOTE — PROGRESS NOTES
[facility]  Hospitalist Progress Note  Christian Fletcher DO MPH 03/30/2017    Reason for Stay (Diagnosis): Flu, PNA         Assessment and Plan:      Summary of Stay: Donald Raza is a 67 year old male with a history of DM2, HIV, TIA, ESRD on HD, CAD, HTN admitted on 3/28/2017 with fever, cough, and weakness ultimately dx with influenza and PNA and started on tamiflu and vanco/zosyn, respectively.    Problem List:   1. Influenza: Continue Tamiflu, dosing after HD per ID.  2. PNA: Procal elevated to about 5, infiltrate on CXR but recent HCAP. Continue vanco/zosyn for now. Clinically improving.  3. ESRD on HD qTRS s/p failed transplant: No issues. Renal consulted. HD today. Continue Cellcept.  4. Acute on chronic anemia: No obvious bleeding. Re-order 2 U PRBC and needs irradiated products. Has undergone GI w/u with recent neg EGD.  5. CAD s/p PCI with stenting (within the past year): Stable, no CP. On DAP. Continue ASA 81, plavix, atorvastatin 40, coreg 12.5 bid, imdur 60 daily.  6. HIV: Appreciate ID input. Not likely active issue. Continue PTA norvir, tivicay, prezista, dapsone.  7. Hx of TIA: Stable, continue atorvastatin and ASA/plavix.  8. HTN: BP reasonable. HD today. Continue coreg, norvasc 5, hydralazine 25 tid, imdur, losartan 100.  9. DM: BG well controlled on current lantus 30 U am, high SSI, 5 U WM.    DVT Prophylaxis: Pneumatic Compression Devices  Code Status: Full Code  Disposition: Expected discharge in 2-3 days to home.        Interval History (Subjective):      Assumed care from previous hospitalist. The history was fully reviewed.  Pt doing well. No chest pain or shortness of breath. No nausea, vomiting, diarrhea, constipation. Fevers 101.1. No other complaints identified.                   Physical Exam:      Vital Signs:  Temp: 98.3  F (36.8  C) Temp src: Oral BP: 147/72   Heart Rate: 79 Resp: 16 SpO2: 96 % O2 Device: Nasal cannula Oxygen Delivery: 2 LPM  Vitals:    03/28/17 1929 03/28/17  2300 03/30/17 0500   Weight: 88.6 kg (195 lb 5.2 oz) 80.3 kg (177 lb) 81.4 kg (179 lb 6.4 oz)     Vital Signs with Ranges  Temp:  [97.7  F (36.5  C)-102.3  F (39.1  C)] 98.3  F (36.8  C)  Heart Rate:  [63-80] 79  Resp:  [16-20] 16  BP: (117-158)/(58-78) 147/72  FiO2 (%):  [2 %] 2 %  SpO2:  [88 %-96 %] 96 %  I/O last 3 completed shifts:  In: 1245 [P.O.:840; I.V.:405]  Out: 100 [Urine:100]    GENERAL: No apparent distress. Awake, alert, and fully oriented.  HEENT: Normocephalic, atraumatic. Extraocular movements intact.  CARDIOVASCULAR: Regular rate and rhythm without murmurs or rubs. No S3.  PULMONARY: Clear bilaterally.  ABDOMINAL: Soft, non-tender, non-distended. Bowel sounds normoactive. No hepatosplenomegaly.  EXTREMITIES: No cyanosis or clubbing. No edema.  NEUROLOGICAL: CN 2-12 grossly intact, no focal neurological deficits.  DERMATOLOGICAL: No rash, ulcer, ecchymoses, jaundice.         Medications:      All current medications were reviewed with changes reflected in problem list.         Data:      All new lab, EKGs, telemetry strips, and imaging data was personally reviewed.   Labs:    Recent Labs  Lab 03/29/17 0620 03/28/17 1953   WBC 4.2 4.4   HGB 6.5* 6.9*   HCT 21.3* 21.8*   MCV 96 94   PLT 93* 109*       Recent Labs  Lab 03/29/17 0620 03/28/17 1953    135   POTASSIUM 4.3 4.0   CHLORIDE 97 95   CO2 28 28   ANIONGAP 10 12   * 303*   BUN 36* 29   CR 5.50* 4.61*   GFRESTIMATED 10* 13*   GFRESTBLACK 13* 15*   PRABHA 8.6 9.0     Imaging (past 24 hours):   No results found for this or any previous visit (from the past 24 hour(s)).    Christian Fletcher DO MPH  [facility] Hospitalist  [address]  [place] [phone]  Pager: [phone]  03/30/2017

## 2017-03-30 NOTE — PROGRESS NOTES
Assessment and Plan:   ESRD: seen on dialysis. 3.5 h, 3K, 2-3 L UF, LUAF with 450 BFR. Getting EPO.             Interval History:   Influenza A: on tamiflu, dosed for renal failure. Also antibiotics for pneumonia.    HIV  Anemia: Tx 2 U PRBC today. On EPO with dialysis.     ASCVD  DM              Review of Systems:   No sx on dialysis.           Medications:       epoetin brittni (EPOGEN,PROCRIT) inj ESRD  8,000 Units Intravenous See Admin Instructions     vancomycin place koenig - receiving intermittent dosing  1 each Does not apply See Admin Instructions     omeprazole  40 mg Oral Daily     B complex-vitamin C-folic acid  1 tablet Oral QPM     magnesium oxide (MAG-OX) tablet 400 mg  400 mg Oral At Bedtime     losartan  100 mg Oral At Bedtime     hydrALAZINE  25 mg Oral TID     gabapentin  600 mg Oral QPM     B complex-C-folic acid  1 capsule Oral QPM     amLODIPine  5 mg Oral At Bedtime     insulin aspart  1-10 Units Subcutaneous TID AC     insulin aspart  1-7 Units Subcutaneous At Bedtime     mycophenolate  500 mg Oral BID     oseltamivir  30 mg Oral Once per day on Tue Thu Sat     aspirin chewable tablet 81 mg  81 mg Oral QPM     atorvastatin  40 mg Oral At Bedtime     calcium acetate  2,001 mg Oral TID w/meals     carvedilol  12.5 mg Oral BID w/meals     clopidogrel (PLAVIX) tablet 75 mg  75 mg Oral QPM     dapsone  100 mg Oral At Bedtime     dolutegravir  50 mg Oral At Bedtime     darunavir  800 mg Oral At Bedtime     gabapentin  300 mg Oral QAM     insulin glargine  30 Units Subcutaneous QAM     insulin aspart  5 Units Subcutaneous TID AC     isosorbide mononitrate  60 mg Oral QPM     ritonavir  100 mg Oral At Bedtime     piperacillin-tazobactam  2.25 g Intravenous Q6H     abacavir  600 mg Oral QPM       heparin (porcine) 500 Units/hr (03/30/17 0828)     Current active medications and PTA medications reviewed, see medication list for details.            Physical Exam:   Vitals were reviewed  Patient  Vitals for the past 24 hrs:   BP Temp Temp src Heart Rate Resp SpO2 Weight   17 1000 130/68 - - 69 16 - -   17 0945 137/67 - - 70 - - -   17 0930 131/69 - - 66 - - -   17 0915 138/68 - - 69 - - -   17 0900 133/65 - - 64 - - -   17 0845 132/64 - - 64 - - -   17 0830 126/63 - - 63 - - -   17 0815 118/60 - - 64 - - -   17 0800 117/60 97.7  F (36.5  C) Oral 68 18 96 % -   17 0500 - - - - - - 81.4 kg (179 lb 6.4 oz)   17 0407 132/78 98.8  F (37.1  C) Oral 78 20 95 % -   17 0221 - 99.7  F (37.6  C) Oral - - - -   17 2353 132/62 101.1  F (38.4  C) Oral 80 20 95 % -   17 2157 133/58 - - - - - -   17 1933 - 99.5  F (37.5  C) Oral - - - -   17 1800 - 99.2  F (37.3  C) Oral - - - -   17 1648 - - - - - 92 % -   17 1631 151/64 102.3  F (39.1  C) Oral 78 20 (!) 88 % -   17 1158 120/55 99.6  F (37.6  C) Oral 60 18 - -       Temp:  [97.7  F (36.5  C)-102.3  F (39.1  C)] 97.7  F (36.5  C)  Heart Rate:  [60-80] 69  Resp:  [16-20] 16  BP: (117-151)/(55-78) 130/68  FiO2 (%):  [2 %] 2 %  SpO2:  [88 %-96 %] 96 %    Temperatures:  Current - Temp: 97.7  F (36.5  C); Max - Temp  Av.7  F (37.6  C)  Min: 97.7  F (36.5  C)  Max: 102.3  F (39.1  C)  Respiration range: Resp  Av.7  Min: 16  Max: 20  Pulse range: No Data Recorded  Blood pressure range: Systolic (24hrs), Av , Min:117 , Max:151   ; Diastolic (24hrs), Av, Min:55, Max:78    Pulse oximetry range: SpO2  Av.2 %  Min: 88 %  Max: 96 %    I/O last 3 completed shifts:  In: 1245 [P.O.:840; I.V.:405]  Out: 100 [Urine:100]      Intake/Output Summary (Last 24 hours) at 17 1015  Last data filed at 17 2200   Gross per 24 hour   Intake             1125 ml   Output              100 ml   Net             1025 ml     Resting in bed. No distress. LAF with needles in place.       Wt Readings from Last 4 Encounters:   17 81.4 kg (179 lb 6.4 oz)    01/29/17 79.9 kg (176 lb 3.2 oz)   01/22/17 82.6 kg (182 lb 1.6 oz)   11/28/16 88.9 kg (196 lb)          Data:          Lab Results   Component Value Date     03/29/2017     03/28/2017     01/28/2017    Lab Results   Component Value Date    CHLORIDE 97 03/29/2017    CHLORIDE 95 03/28/2017    CHLORIDE 92 01/28/2017    Lab Results   Component Value Date    BUN 36 03/29/2017    BUN 29 03/28/2017    BUN 64 01/28/2017      Lab Results   Component Value Date    POTASSIUM 4.3 03/29/2017    POTASSIUM 4.0 03/28/2017    POTASSIUM 4.5 01/28/2017    Lab Results   Component Value Date    CO2 28 03/29/2017    CO2 28 03/28/2017    CO2 26 01/28/2017    Lab Results   Component Value Date    CR 5.50 03/29/2017    CR 4.61 03/28/2017    CR 7.90 01/28/2017        Recent Labs   Lab Test  03/29/17   0620  03/28/17 1953 01/28/17   0745   WBC  4.2  4.4  4.1   HGB  6.5*  6.9*  8.2*   HCT  21.3*  21.8*  25.8*   MCV  96  94  91   PLT  93*  109*  134*     Recent Labs   Lab Test  03/28/17 1953 01/17/17   0523  01/12/17   0526   07/06/14   2255   AST  23  29  66*   < >  18   ALT  29  48  152*   < >  17   ALKPHOS  120  54  88   < >  132   BILITOTAL  0.6  0.7  0.8   < >  0.5   BILICONJ   --    --    --    --   0.0    < > = values in this interval not displayed.       Recent Labs   Lab Test  01/25/17   1242  01/20/17   0510  01/18/17   0630   MAG  2.1  2.7*  2.3     Recent Labs   Lab Test  01/21/17   0558  01/16/17   0450  01/08/17   0636   PHOS  4.6*  7.1*  5.1*     Recent Labs   Lab Test  03/29/17   0620  03/28/17 1953 01/28/17   0745   PRABHA  8.6  9.0  8.5       Lab Results   Component Value Date    PRABHA 8.6 03/29/2017     Lab Results   Component Value Date    WBC 4.2 03/29/2017    HGB 6.5 (LL) 03/29/2017    HCT 21.3 (L) 03/29/2017    MCV 96 03/29/2017    PLT 93 (L) 03/29/2017     Lab Results   Component Value Date     03/29/2017    POTASSIUM 4.3 03/29/2017    CHLORIDE 97 03/29/2017    CO2 28 03/29/2017      (H) 03/29/2017     Lab Results   Component Value Date    BUN 36 (H) 03/29/2017    CR 5.50 (H) 03/29/2017     Lab Results   Component Value Date    MAG 2.1 01/25/2017     Lab Results   Component Value Date    PHOS 4.6 (H) 01/21/2017       Creatinine   Date Value Ref Range Status   03/29/2017 5.50 (H) 0.66 - 1.25 mg/dL Final   03/28/2017 4.61 (H) 0.66 - 1.25 mg/dL Final   01/28/2017 7.90 (H) 0.66 - 1.25 mg/dL Final   01/27/2017 5.94 (H) 0.66 - 1.25 mg/dL Final   01/26/2017 9.71 (H) 0.66 - 1.25 mg/dL Final   01/25/2017 7.34 (H) 0.66 - 1.25 mg/dL Final       Attestation:  I have reviewed today's vital signs, notes, medications, labs and imaging.  Seen on dialysis.      Eric Bradford MD

## 2017-03-30 NOTE — PROGRESS NOTES
Potassium   Date Value Ref Range Status   03/29/2017 4.3 3.4 - 5.3 mmol/L Final   ]  Lab Results   Component Value Date    HGB 6.5 03/29/2017     Weight: 81.4 kg (179 lb 6.4 oz) (bed scale)    DIALYSIS PROCEDURE NOTE    Patient dialyzed for 4.45 hrs on a 3 K bath with a  net fluid removal of 2L. Run extended waiting for RBCs to come from the U. A BFR of 450ml/min was obtained via LUAF and all connections secured.   Patient was seen by  during treatment.  Total heparin received during treatment:: 2000units.  2uRBCs given. Meds given:EPO. Complications:none   Procedure and ESRD teaching done and questions answered.  See flowsheet in EPIC for further details.    RO log completed  Prime given: NS  Saline double clamped and Arterial/Venous parameters set.   Patient dialyzed at bedside due to ISO   Aseptic prep done for both on/off.  Transducer connectors checked q15 minutes with vital sign check  :  Report received from: CARRINGTON Ocampo RN  Report given to: CARRINGTON Ocampo RN  Outpatient Dialysis at  [facility]    Hepatitis Antigen neg   Hepatitis Antibody immune 1/18/17

## 2017-03-30 NOTE — PHARMACY-VANCOMYCIN DOSING SERVICE
Pharmacy Vancomycin Note  Date of Service 2017  Patient's  1950   67 year old, male    Indication: [facility] Pneumonia  Goal Trough Level: 15-20 mg/L  Day of Therapy: 3  Current Vancomycin regimen:  Intermittent dosing in chronic HD pt    Current estimated CrCl = Estimated Creatinine Clearance: 14.6 mL/min (based on Cr of 5.5).    Creatinine for last 3 days  3/28/2017:  7:53 PM Creatinine 4.61 mg/dL  3/29/2017:  6:20 AM Creatinine 5.50 mg/dL    Recent Vancomycin Levels (past 3 days)  3/30/2017:  5:05 AM Vancomycin Level 20.0 mg/L    Vancomycin IV Administrations (past 72 hours)                   vancomycin (VANCOCIN) 2,000 mg in NaCl 0.9 % 500 mL intermittent infusion (mg) 2,000 mg New Bag 17 215                Nephrotoxins and other renal medications (Future)    Start     Dose/Rate Route Frequency Ordered Stop    17 1400  vancomycin (VANCOCIN) 750 mg in NaCl 0.9 % 250 mL intermittent infusion      750 mg Intravenous ONCE 17 1309      17 0400  piperacillin-tazobactam (ZOSYN) infusion 2.25 g      2.25 g  100 mL/hr over 30 Minutes Intravenous EVERY 6 HOURS 17 2325      17 0025  vancomycin place koenig - receiving intermittent dosing      1 each Does not apply SEE ADMIN INSTRUCTIONS 17 0025               Contrast Orders - past 72 hours     None          Interpretation of levels and current regimen:  Trough level is  Therapeutic    Has serum creatinine changed > 50% in last 72 hours: n/a, chronic HD pt    Urine output:  N/a, chronic HD pt    Renal Function: ESRD on Dialysis    Plan:  1.  Per protocol, give one time 10mg/kg dose (750mg) post HD today  2.  Pharmacy will check trough levels as appropriate in check pre-HD level prior to HD on Saturday per protocol..    3. Serum creatinine levels will be ordered per MD given chronic HD pt.      Amie Anthony        .

## 2017-03-30 NOTE — CONSULTS
ID consult dictated IMP 1 66 yo male  With acute influenza A, / pneumonia, complex hx    REc tamiflu and for now antibiotics, continue HIV meds, not likely any HIV issue here

## 2017-03-30 NOTE — CONSULTS
Care Transition Initial Assessment - RN        Met with: Patient.    DATA   Active Problems:    Pneumonia       Cognitive Status: awake, alert and oriented.        Contact information and PCP information verified: Yes    Lives With: spouse                      Insurance concerns: No Insurance issues identified    ASSESSMENT  Patient currently receives the following services:  None          Identified issues/concerns regarding health management: pt was admitted with pna and influenza, he also is a dialysis pt tuesdays, thursdays and saturdays at [facility]. Pt has diabetes and checks his blood sugars 3 times a day and in on insulin, pt states things are going well at home with this. He follows a renal diet and watches his sugar intake. Pt uses a cane at baseline with mobility. Pna action plan was reviewed and given to the pt. Pt very receptive.       PLAN      Patient anticipates discharging to home.        Patient anticipates needs for home equipment: No    Plan/Disposition: Home   Appointments: pt will schedule a follow up PCP at discharge if needed.         Care  (CTS) will continue to follow as needed.  Kaci Huitron RN   Care Coordinator  [phone]

## 2017-03-30 NOTE — PLAN OF CARE
Problem: Goal Outcome Summary  Goal: Goal Outcome Summary  Outcome: No Change  Pt. A&O. VSS, tmax 101.0, PRN tylenol administered x1, re-check 99.7. O2 at 2lpm. Denies pain.Transfers with SBA, d/t weakness. . Abx zosyn. LS-coarse, wheezes. HD run today, plan to transfuse 2 units during. Nephrology following. Tele- SR.

## 2017-03-31 LAB
ANION GAP SERPL CALCULATED.3IONS-SCNC: 9 MMOL/L (ref 3–14)
BUN SERPL-MCNC: 31 MG/DL (ref 7–30)
CALCIUM SERPL-MCNC: 8.1 MG/DL (ref 8.5–10.1)
CHLORIDE SERPL-SCNC: 101 MMOL/L (ref 94–109)
CO2 SERPL-SCNC: 28 MMOL/L (ref 20–32)
CREAT SERPL-MCNC: 5.11 MG/DL (ref 0.66–1.25)
ERYTHROCYTE [DISTWIDTH] IN BLOOD BY AUTOMATED COUNT: 14.9 % (ref 10–15)
GFR SERPL CREATININE-BSD FRML MDRD: 11 ML/MIN/1.7M2
GLUCOSE BLDC GLUCOMTR-MCNC: 185 MG/DL (ref 70–99)
GLUCOSE BLDC GLUCOMTR-MCNC: 223 MG/DL (ref 70–99)
GLUCOSE BLDC GLUCOMTR-MCNC: 228 MG/DL (ref 70–99)
GLUCOSE BLDC GLUCOMTR-MCNC: 242 MG/DL (ref 70–99)
GLUCOSE BLDC GLUCOMTR-MCNC: 251 MG/DL (ref 70–99)
GLUCOSE BLDC GLUCOMTR-MCNC: 263 MG/DL (ref 70–99)
GLUCOSE SERPL-MCNC: 261 MG/DL (ref 70–99)
HCT VFR BLD AUTO: 23.4 % (ref 40–53)
HGB BLD-MCNC: 7.3 G/DL (ref 13.3–17.7)
INTERPRETATION ECG - MUSE: NORMAL
MCH RBC QN AUTO: 29.4 PG (ref 26.5–33)
MCHC RBC AUTO-ENTMCNC: 31.2 G/DL (ref 31.5–36.5)
MCV RBC AUTO: 94 FL (ref 78–100)
PLATELET # BLD AUTO: 79 10E9/L (ref 150–450)
POTASSIUM SERPL-SCNC: 4.4 MMOL/L (ref 3.4–5.3)
RBC # BLD AUTO: 2.48 10E12/L (ref 4.4–5.9)
SODIUM SERPL-SCNC: 138 MMOL/L (ref 133–144)
TROPONIN I SERPL-MCNC: 0.03 UG/L (ref 0–0.04)
WBC # BLD AUTO: 3.9 10E9/L (ref 4–11)

## 2017-03-31 PROCEDURE — 12000007 ZZH R&B INTERMEDIATE

## 2017-03-31 PROCEDURE — 25000131 ZZH RX MED GY IP 250 OP 636 PS 637: Mod: GY

## 2017-03-31 PROCEDURE — A9270 NON-COVERED ITEM OR SERVICE: HCPCS | Mod: GY | Performed by: INTERNAL MEDICINE

## 2017-03-31 PROCEDURE — 25000132 ZZH RX MED GY IP 250 OP 250 PS 637: Mod: GY | Performed by: INTERNAL MEDICINE

## 2017-03-31 PROCEDURE — A9270 NON-COVERED ITEM OR SERVICE: HCPCS | Mod: GY | Performed by: HOSPITALIST

## 2017-03-31 PROCEDURE — 84484 ASSAY OF TROPONIN QUANT: CPT | Performed by: HOSPITALIST

## 2017-03-31 PROCEDURE — 85027 COMPLETE CBC AUTOMATED: CPT | Performed by: HOSPITALIST

## 2017-03-31 PROCEDURE — 36415 COLL VENOUS BLD VENIPUNCTURE: CPT | Performed by: HOSPITALIST

## 2017-03-31 PROCEDURE — 25000132 ZZH RX MED GY IP 250 OP 250 PS 637: Mod: GY | Performed by: HOSPITALIST

## 2017-03-31 PROCEDURE — 25000131 ZZH RX MED GY IP 250 OP 636 PS 637: Mod: GY | Performed by: INTERNAL MEDICINE

## 2017-03-31 PROCEDURE — 00000146 ZZHCL STATISTIC GLUCOSE BY METER IP

## 2017-03-31 PROCEDURE — 25000128 H RX IP 250 OP 636: Performed by: INTERNAL MEDICINE

## 2017-03-31 PROCEDURE — 99233 SBSQ HOSP IP/OBS HIGH 50: CPT | Performed by: HOSPITALIST

## 2017-03-31 PROCEDURE — 80048 BASIC METABOLIC PNL TOTAL CA: CPT | Performed by: HOSPITALIST

## 2017-03-31 RX ORDER — NITROGLYCERIN 0.4 MG/1
0.4 TABLET SUBLINGUAL EVERY 5 MIN PRN
Status: DISCONTINUED | OUTPATIENT
Start: 2017-03-31 | End: 2017-04-11 | Stop reason: HOSPADM

## 2017-03-31 RX ADMIN — Medication 2 LOZENGE: at 03:16

## 2017-03-31 RX ADMIN — CALCIUM ACETATE 2001 MG: 667 CAPSULE ORAL at 17:47

## 2017-03-31 RX ADMIN — MAGNESIUM OXIDE TAB 400 MG (241.3 MG ELEMENTAL MG) 400 MG: 400 (241.3 MG) TAB at 21:21

## 2017-03-31 RX ADMIN — ASPIRIN 81 MG CHEWABLE TABLET 81 MG: 81 TABLET CHEWABLE at 21:21

## 2017-03-31 RX ADMIN — MYCOPHENOLATE MOFETIL 500 MG: 250 CAPSULE ORAL at 21:20

## 2017-03-31 RX ADMIN — OMEPRAZOLE 40 MG: 20 CAPSULE, DELAYED RELEASE ORAL at 08:20

## 2017-03-31 RX ADMIN — CARVEDILOL 12.5 MG: 12.5 TABLET, FILM COATED ORAL at 08:21

## 2017-03-31 RX ADMIN — ATORVASTATIN CALCIUM 40 MG: 40 TABLET, FILM COATED ORAL at 21:20

## 2017-03-31 RX ADMIN — CALCIUM ACETATE 2001 MG: 667 CAPSULE ORAL at 13:00

## 2017-03-31 RX ADMIN — TAZOBACTAM SODIUM AND PIPERACILLIN SODIUM 2.25 G: 250; 2 INJECTION, SOLUTION INTRAVENOUS at 21:21

## 2017-03-31 RX ADMIN — MYCOPHENOLATE MOFETIL 500 MG: 250 CAPSULE ORAL at 08:20

## 2017-03-31 RX ADMIN — HYDRALAZINE HYDROCHLORIDE 25 MG: 25 TABLET ORAL at 16:02

## 2017-03-31 RX ADMIN — Medication 1 CAPSULE: at 21:19

## 2017-03-31 RX ADMIN — GABAPENTIN 300 MG: 300 CAPSULE ORAL at 08:21

## 2017-03-31 RX ADMIN — LOSARTAN POTASSIUM 100 MG: 100 TABLET, FILM COATED ORAL at 21:20

## 2017-03-31 RX ADMIN — CARVEDILOL 12.5 MG: 12.5 TABLET, FILM COATED ORAL at 17:48

## 2017-03-31 RX ADMIN — HYDRALAZINE HYDROCHLORIDE 25 MG: 25 TABLET ORAL at 21:20

## 2017-03-31 RX ADMIN — NITROGLYCERIN 0.4 MG: 0.4 TABLET SUBLINGUAL at 07:18

## 2017-03-31 RX ADMIN — DOLUTEGRAVIR SODIUM 50 MG: 50 TABLET, FILM COATED ORAL at 21:20

## 2017-03-31 RX ADMIN — AMLODIPINE BESYLATE 5 MG: 5 TABLET ORAL at 21:20

## 2017-03-31 RX ADMIN — GUAIFENESIN 10 ML: 100 SOLUTION ORAL at 21:34

## 2017-03-31 RX ADMIN — ISOSORBIDE MONONITRATE 60 MG: 30 TABLET, EXTENDED RELEASE ORAL at 21:20

## 2017-03-31 RX ADMIN — NITROGLYCERIN 0.4 MG: 0.4 TABLET SUBLINGUAL at 06:14

## 2017-03-31 RX ADMIN — TAZOBACTAM SODIUM AND PIPERACILLIN SODIUM 2.25 G: 250; 2 INJECTION, SOLUTION INTRAVENOUS at 03:09

## 2017-03-31 RX ADMIN — CALCIUM ACETATE 2001 MG: 667 CAPSULE ORAL at 08:20

## 2017-03-31 RX ADMIN — INSULIN GLARGINE 30 UNITS: 100 INJECTION, SOLUTION SUBCUTANEOUS at 08:24

## 2017-03-31 RX ADMIN — HYDRALAZINE HYDROCHLORIDE 25 MG: 25 TABLET ORAL at 08:21

## 2017-03-31 RX ADMIN — ABACAVIR 600 MG: 300 TABLET, FILM COATED ORAL at 21:19

## 2017-03-31 RX ADMIN — GABAPENTIN 600 MG: 300 CAPSULE ORAL at 21:21

## 2017-03-31 RX ADMIN — RITONAVIR 100 MG: 100 TABLET, FILM COATED ORAL at 21:21

## 2017-03-31 RX ADMIN — DAPSONE 100 MG: 25 TABLET ORAL at 21:19

## 2017-03-31 RX ADMIN — NITROGLYCERIN 0.4 MG: 0.4 TABLET SUBLINGUAL at 06:49

## 2017-03-31 RX ADMIN — TAZOBACTAM SODIUM AND PIPERACILLIN SODIUM 2.25 G: 250; 2 INJECTION, SOLUTION INTRAVENOUS at 10:50

## 2017-03-31 RX ADMIN — TAZOBACTAM SODIUM AND PIPERACILLIN SODIUM 2.25 G: 250; 2 INJECTION, SOLUTION INTRAVENOUS at 16:03

## 2017-03-31 RX ADMIN — DARUNAVIR 800 MG: 800 TABLET, FILM COATED ORAL at 21:21

## 2017-03-31 RX ADMIN — CLOPIDOGREL 75 MG: 75 TABLET, FILM COATED ORAL at 21:21

## 2017-03-31 ASSESSMENT — PAIN DESCRIPTION - DESCRIPTORS
DESCRIPTORS: RADIATING

## 2017-03-31 NOTE — PROGRESS NOTES
[facility]  Hospitalist Progress Note  Christian Fletcher DO MPH 03/31/2017    Reason for Stay (Diagnosis): Flu, PNA         Assessment and Plan:      Summary of Stay: Donald Raza is a 67 year old male with a history of DM2, HIV, TIA, ESRD on HD, CAD, HTN admitted on 3/28/2017 with fever, cough, and weakness ultimately dx with influenza and PNA and started on tamiflu and vanco/zosyn, respectively.    Problem List:   1. Influenza: Continue Tamiflu, dosing after HD per ID.  2. PNA: Procal elevated to about 5, infiltrate on CXR but recent HCAP. Continue vanco/zosyn for now. Clinically improving.  3. ESRD on HD qTRS s/p failed transplant: No issues. Renal consulted. Continue Cellcept.  4. Acute on chronic anemia: No obvious bleeding. Unclear etiology, likely multifactorial. Recieved 2 U PRBC 3/30 and needs irradiated products. Has undergone GI w/u with recent neg EGD. Hb stable in 7s. Monitor daily, if stable will not work up while hospitalized but would benefit from OP Heme evaluation.  5. CAD s/p PCI with stenting (within the past year): Stable, no CP. On DAP. Continue ASA 81, plavix, atorvastatin 40, coreg 12.5 bid, imdur 60 daily. Recent cath from Jan reviewed.  6. HIV: Appreciate ID input. Not likely active issue. Continue PTA norvir, tivicay, prezista, dapsone.  7. Hx of TIA: Stable, continue atorvastatin and ASA/plavix.  8. HTN: BP reasonable. HD today. Continue coreg, norvasc 5, hydralazine 25 tid, imdur, losartan 100.  9. DM: BG more elevated today but was well controlled on current lantus 30 U am, high SSI, 5 U WM. Continue same for now.  10. Non-exertional chest pain: Resolved. Does not sound cardiac. Undetectable trops, non concerning EKG. Recent cath reviewed, no significant obstructive vessels. Doubt PE, dissection. More likely MSK vs pleurisy from cough and infection.    DVT Prophylaxis: Pneumatic Compression Devices  Code Status: Full Code  Disposition: Expected discharge Mon or Tue to home.         Interval History (Subjective):      Pt doing well. Improoved shortness of breath. No nausea, vomiting, diarrhea, constipation. Still febrile. Had CP overnight, now resolved with SL nitro. No other complaints identified.                   Physical Exam:      Vital Signs:  Temp: 99  F (37.2  C) Temp src: Oral BP: 142/67 Pulse: 71 Heart Rate: 79 Resp: 20 SpO2: 95 % O2 Device: Nasal cannula Oxygen Delivery: 2 LPM  Vitals:    03/28/17 2300 03/30/17 0500 03/31/17 0311   Weight: 80.3 kg (177 lb) 81.4 kg (179 lb 6.4 oz) 83.8 kg (184 lb 11.9 oz)     Vital Signs with Ranges  Temp:  [97.6  F (36.4  C)-100.4  F (38  C)] 99  F (37.2  C)  Pulse:  [71-79] 71  Heart Rate:  [71-83] 79  Resp:  [16-20] 20  BP: (132-159)/(62-82) 142/67  SpO2:  [95 %-99 %] 95 %  I/O last 3 completed shifts:  In: 480 [P.O.:480]  Out: 2350 [Urine:350; Other:2000]    GENERAL: No apparent distress. Awake, alert, and fully oriented.  HEENT: Normocephalic, atraumatic. Extraocular movements intact.  CARDIOVASCULAR: Regular rate and rhythm without murmurs or rubs. No S3.  PULMONARY: Clear bilaterally.  ABDOMINAL: Soft, non-tender, non-distended. Bowel sounds normoactive. No hepatosplenomegaly.  EXTREMITIES: No cyanosis or clubbing. No edema.  NEUROLOGICAL: CN 2-12 grossly intact, no focal neurological deficits.  DERMATOLOGICAL: No rash, ulcer, ecchymoses, jaundice.         Medications:      All current medications were reviewed with changes reflected in problem list.         Data:      All new lab, EKGs, telemetry strips, and imaging data was personally reviewed.   Labs:    Recent Labs  Lab 03/31/17  0640 03/29/17  0620 03/28/17 1953   WBC 3.9* 4.2 4.4   HGB 7.3* 6.5* 6.9*   HCT 23.4* 21.3* 21.8*   MCV 94 96 94   PLT 79* 93* 109*       Recent Labs  Lab 03/31/17  0640 03/29/17  0620 03/28/17 1953    135 135   POTASSIUM 4.4 4.3 4.0   CHLORIDE 101 97 95   CO2 28 28 28   ANIONGAP 9 10 12   * 169* 303*   BUN 31* 36* 29   CR 5.11* 5.50* 4.61*    GFRESTIMATED 11* 10* 13*   GFRESTBLACK 14* 13* 15*   PRABHA 8.1* 8.6 9.0     Imaging (past 24 hours):   No results found for this or any previous visit (from the past 24 hour(s)).    Christian Fletcher DO MPH  [facility] Hospitalist  [address] [place] [address]  Pager: [phone]  03/31/2017

## 2017-03-31 NOTE — PLAN OF CARE
Problem: Goal Outcome Summary  Goal: Goal Outcome Summary  Outcome: No Change  Ambulatory Status: Pt up with Ax1, due to weakness and fatigue had dialysis today  VS: Stable, fever during shift, tylenol given.  Pain: 8/10 with right sided chest pain- notified MD via page, did EKG and trop level-   Resp: LS expiratory wheezing and coarse  Cardiac: TELE: SR  GI: denies nausea. fair appetite and on mod carb diet. Passing flatus. Last BM today.  : Makes little urine due to dialysis.   Skin: Fistula in left arm   Tx: Dialysis, IV abx, tamiflu, manage pain and fever  Labs:2 units of blood given during dialysis  Consults: Nephrology  Disposition: TBD- will continue to monitor.

## 2017-03-31 NOTE — PLAN OF CARE
Problem: Goal Outcome Summary  Goal: Goal Outcome Summary  Outcome: No Change  Dx: PNA, Influenza A+     Vitals: Temp: 98.6  F (37  C) Temp src: Oral BP: 132/62 Pulse: 71 Heart Rate: 67 Resp: 20 SpO2: 98 % O2 Device: Nasal cannula Oxygen Delivery: 2 LPM     Pain: C/O 9/10 chest pain at end of night shift-received 3 SL nitro on night shift. Pain decreased to 1-2/10. MD following.   Neuro: A&Ox4. BLE numbness/tingling.   Cardiac: Tele: SR with rare PAC's. HR 60-80's.  Resp: 2L NC  GI/: Voiding, soft/loose BM today.   Diet: Regular, dialysis, diabetic.   Labs: K+ 4.4, Crt 5.11, WBC 3.9, Hgb 7.3, Plt 79. , 223.  Skin: scabbing. HD fistula to left arm.   Activity: up with assist x1.   IV: Right PIV SL'd.     PLAN: Continue IV Vanco and IV Zosyn for PNA. Continue Tamiflu at renal adjusted dose (takes after HD). HD tomorrow. Continue to monitor chest pain. PPX: PCD's.

## 2017-03-31 NOTE — PLAN OF CARE
Problem: Goal Outcome Summary  Goal: Goal Outcome Summary  Outcome: No Change  Pt. A&O. VSS, afebrile. O2 at 2lpm. Denies pain.Transfers with SBA, d/t weakness. . Abx zosynmarshallo. LS-coarse. Pt. Had complaints of chest pain, stabbing like, with radiating back pain. MD zuniga, another troponin ordered, and PRN nitro. PRN Nitro given x2 with effective results. Nephrology following. Tele- SR. Plan for D/C 2-3 days.

## 2017-03-31 NOTE — PLAN OF CARE
Problem: Goal Outcome Summary  Goal: Goal Outcome Summary  Outcome: No Change  AO, VSS-2LNC. LS diminished. Denies any pain. Fistula left arm. Up SBA.

## 2017-03-31 NOTE — PROVIDER NOTIFICATION
AM trop 0.025. Pt c/o chest pain evenings, nights and this AM. Received nitro x3-pain decreased for short while then returns.   MD paged.

## 2017-03-31 NOTE — PROGRESS NOTES
[facility]  Infectious Disease Progress Note          Assessment and Plan:   IMPRESSION:   1. A 67-year-old male, complicated medical history, admitted with acute respiratory symptoms found to be influenza A positive, almost certainly that is the primary diagnosis, some infiltrate, certainly at risk for secondary pneumonia, being treated as such.   2. History of human immunodeficiency virus infection, without major opportunistic infections controlled disease, most recent T cells somewhat low at 184, but not likely to have an opportunistic infection.   3. Prior hospitalization in January with chest pain and a history of coronary disease.   4. Nosocomial pneumonia as a complication of the above hospitalization including intubation in January, respiratory status stable since.   5. Diabetes mellitus.   6. End-stage renal disease on chronic dialysis.   7. Sulfa allergy.   8. History of nephrolithiasis.       RECOMMENDATIONS:   1. Treat influenza with Tamiflu at renal adjusted dose.   2. For now vancomycin and Zosyn as though possible secondary pneumonia.   3. Follow clinically, probably not prolonged antibiotics needed here depending on clinical response; hold on a bigger workup for now.   4. Continue HIV meds as previously.  5 Likely antibiotics thru [facility], call if issues         Interval History:   no new complaints and doing Omk some chest pain, O2 Ok on 2 L, no + cxs LGF              Medications:       - MEDICATION INSTRUCTIONS for Dialysis Patients -   Does not apply See Admin Instructions     vancomycin place koenig - receiving intermittent dosing  1 each Does not apply See Admin Instructions     omeprazole  40 mg Oral Daily     B complex-vitamin C-folic acid  1 tablet Oral QPM     magnesium oxide (MAG-OX) tablet 400 mg  400 mg Oral At Bedtime     losartan  100 mg Oral At Bedtime     hydrALAZINE  25 mg Oral TID     gabapentin  600 mg Oral QPM     B complex-C-folic acid  1 capsule Oral QPM      "amLODIPine  5 mg Oral At Bedtime     insulin aspart  1-10 Units Subcutaneous TID AC     insulin aspart  1-7 Units Subcutaneous At Bedtime     mycophenolate  500 mg Oral BID     oseltamivir  30 mg Oral Once per day on Tue Thu Sat     aspirin chewable tablet 81 mg  81 mg Oral QPM     atorvastatin  40 mg Oral At Bedtime     calcium acetate  2,001 mg Oral TID w/meals     carvedilol  12.5 mg Oral BID w/meals     clopidogrel (PLAVIX) tablet 75 mg  75 mg Oral QPM     dapsone  100 mg Oral At Bedtime     dolutegravir  50 mg Oral At Bedtime     darunavir  800 mg Oral At Bedtime     gabapentin  300 mg Oral QAM     insulin glargine  30 Units Subcutaneous QAM     insulin aspart  5 Units Subcutaneous TID AC     isosorbide mononitrate  60 mg Oral QPM     ritonavir  100 mg Oral At Bedtime     piperacillin-tazobactam  2.25 g Intravenous Q6H     abacavir  600 mg Oral QPM                  Physical Exam:   Blood pressure 142/67, pulse 71, temperature 99  F (37.2  C), temperature source Oral, resp. rate 20, height 1.803 m (5' 11\"), weight 83.8 kg (184 lb 11.9 oz), SpO2 95 %.  Wt Readings from Last 2 Encounters:   03/31/17 83.8 kg (184 lb 11.9 oz)   01/29/17 79.9 kg (176 lb 3.2 oz)     Vital Signs with Ranges  Temp:  [97.6  F (36.4  C)-100.4  F (38  C)] 99  F (37.2  C)  Pulse:  [71-79] 71  Heart Rate:  [69-83] 79  Resp:  [16-20] 20  BP: (130-159)/(62-82) 142/67  SpO2:  [95 %-99 %] 95 %    Constitutional: Awake, alert, cooperative, no apparent distress   Lungs: Congestion to auscultation bilaterally, few crackles some wheezing   Cardiovascular: Regular rate and rhythm, normal S1 and S2, and no murmur noted   Abdomen: Normal bowel sounds, soft, non-distended, non-tender   Skin: No rashes, no cyanosis, no edema   Other:           Data:   All microbiology laboratory data reviewed.  Recent Labs   Lab Test  03/31/17   0640  03/29/17   0620  03/28/17   1953   WBC  3.9*  4.2  4.4   HGB  7.3*  6.5*  6.9*   HCT  23.4*  21.3*  21.8*   MCV  94  96  " 94   PLT  79*  93*  109*     Recent Labs   Lab Test  03/31/17   0640  03/29/17   0620  03/28/17 1953   CR  5.11*  5.50*  4.61*     No lab results found.  Recent Labs   Lab Test  03/29/17   0131  03/28/17   2044  03/28/17 2015 03/28/17 1953 01/07/17   1150  01/05/17 2012 01/05/17 2000 05/19/16   1730  03/05/16   2248   CULT  Canceled, Test credited >10 Squamous epithelial cells/low power field indicates   oral contamination. Please recollect. Notification of test cancellation was   given to Jennifer Pacheco RN [id number] 03.29.17 CF  *  10,000 to 50,000 colonies/mL mixed urogenital subhash  No growth after 3 days  No growth after 3 days  No growth  Incorrectly ordered by PCU/Clinic Canceled, Test credited ordered sputum culture   instead.    No growth  No growth  No growth  Test canceled by physician Duplicate request Charge credited

## 2017-04-01 LAB
ANION GAP SERPL CALCULATED.3IONS-SCNC: 11 MMOL/L (ref 3–14)
BACTERIA SPEC CULT: NORMAL
BACTERIA SPEC CULT: NORMAL
BUN SERPL-MCNC: 49 MG/DL (ref 7–30)
CALCIUM SERPL-MCNC: 8.7 MG/DL (ref 8.5–10.1)
CHLORIDE SERPL-SCNC: 100 MMOL/L (ref 94–109)
CO2 SERPL-SCNC: 27 MMOL/L (ref 20–32)
CREAT SERPL-MCNC: 7.18 MG/DL (ref 0.66–1.25)
ERYTHROCYTE [DISTWIDTH] IN BLOOD BY AUTOMATED COUNT: 14.6 % (ref 10–15)
GFR SERPL CREATININE-BSD FRML MDRD: 8 ML/MIN/1.7M2
GLUCOSE BLDC GLUCOMTR-MCNC: 120 MG/DL (ref 70–99)
GLUCOSE BLDC GLUCOMTR-MCNC: 208 MG/DL (ref 70–99)
GLUCOSE BLDC GLUCOMTR-MCNC: 217 MG/DL (ref 70–99)
GLUCOSE BLDC GLUCOMTR-MCNC: 242 MG/DL (ref 70–99)
GLUCOSE BLDC GLUCOMTR-MCNC: 281 MG/DL (ref 70–99)
GLUCOSE SERPL-MCNC: 214 MG/DL (ref 70–99)
HCT VFR BLD AUTO: 23.3 % (ref 40–53)
HGB BLD-MCNC: 7.5 G/DL (ref 13.3–17.7)
Lab: NORMAL
MCH RBC QN AUTO: 29.9 PG (ref 26.5–33)
MCHC RBC AUTO-ENTMCNC: 32.2 G/DL (ref 31.5–36.5)
MCV RBC AUTO: 93 FL (ref 78–100)
MICRO REPORT STATUS: NORMAL
MICRO REPORT STATUS: NORMAL
PLATELET # BLD AUTO: 81 10E9/L (ref 150–450)
POTASSIUM SERPL-SCNC: 4.3 MMOL/L (ref 3.4–5.3)
RBC # BLD AUTO: 2.51 10E12/L (ref 4.4–5.9)
SODIUM SERPL-SCNC: 138 MMOL/L (ref 133–144)
SPECIMEN SOURCE: NORMAL
SPECIMEN SOURCE: NORMAL
VANCOMYCIN SERPL-MCNC: 20.2 MG/L
WBC # BLD AUTO: 2.9 10E9/L (ref 4–11)

## 2017-04-01 PROCEDURE — A9270 NON-COVERED ITEM OR SERVICE: HCPCS | Mod: GY | Performed by: INTERNAL MEDICINE

## 2017-04-01 PROCEDURE — 25000131 ZZH RX MED GY IP 250 OP 636 PS 637: Mod: GY

## 2017-04-01 PROCEDURE — 99233 SBSQ HOSP IP/OBS HIGH 50: CPT | Performed by: HOSPITALIST

## 2017-04-01 PROCEDURE — 25000128 H RX IP 250 OP 636: Performed by: INTERNAL MEDICINE

## 2017-04-01 PROCEDURE — 90937 HEMODIALYSIS REPEATED EVAL: CPT

## 2017-04-01 PROCEDURE — 80048 BASIC METABOLIC PNL TOTAL CA: CPT | Performed by: INTERNAL MEDICINE

## 2017-04-01 PROCEDURE — 85027 COMPLETE CBC AUTOMATED: CPT | Performed by: INTERNAL MEDICINE

## 2017-04-01 PROCEDURE — 00000146 ZZHCL STATISTIC GLUCOSE BY METER IP

## 2017-04-01 PROCEDURE — 80202 ASSAY OF VANCOMYCIN: CPT | Performed by: INTERNAL MEDICINE

## 2017-04-01 PROCEDURE — 63400005 ZZH RX 634: Performed by: INTERNAL MEDICINE

## 2017-04-01 PROCEDURE — 36415 COLL VENOUS BLD VENIPUNCTURE: CPT | Performed by: INTERNAL MEDICINE

## 2017-04-01 PROCEDURE — 25000125 ZZHC RX 250: Performed by: INTERNAL MEDICINE

## 2017-04-01 PROCEDURE — 25000132 ZZH RX MED GY IP 250 OP 250 PS 637: Mod: GY | Performed by: INTERNAL MEDICINE

## 2017-04-01 PROCEDURE — 12000007 ZZH R&B INTERMEDIATE

## 2017-04-01 PROCEDURE — 25000131 ZZH RX MED GY IP 250 OP 636 PS 637: Mod: GY | Performed by: INTERNAL MEDICINE

## 2017-04-01 PROCEDURE — 25000131 ZZH RX MED GY IP 250 OP 636 PS 637: Mod: GY | Performed by: HOSPITALIST

## 2017-04-01 RX ORDER — HEPARIN SODIUM 1000 [USP'U]/ML
500 INJECTION, SOLUTION INTRAVENOUS; SUBCUTANEOUS
Status: DISCONTINUED | OUTPATIENT
Start: 2017-04-01 | End: 2017-04-01

## 2017-04-01 RX ORDER — HEPARIN SODIUM 1000 [USP'U]/ML
500 INJECTION, SOLUTION INTRAVENOUS; SUBCUTANEOUS CONTINUOUS
Status: DISCONTINUED | OUTPATIENT
Start: 2017-04-01 | End: 2017-04-01

## 2017-04-01 RX ORDER — ALBUMIN (HUMAN) 12.5 G/50ML
50 SOLUTION INTRAVENOUS
Status: DISCONTINUED | OUTPATIENT
Start: 2017-04-01 | End: 2017-04-01

## 2017-04-01 RX ADMIN — TAZOBACTAM SODIUM AND PIPERACILLIN SODIUM 2.25 G: 250; 2 INJECTION, SOLUTION INTRAVENOUS at 23:43

## 2017-04-01 RX ADMIN — TAZOBACTAM SODIUM AND PIPERACILLIN SODIUM 2.25 G: 250; 2 INJECTION, SOLUTION INTRAVENOUS at 09:31

## 2017-04-01 RX ADMIN — TAZOBACTAM SODIUM AND PIPERACILLIN SODIUM 2.25 G: 250; 2 INJECTION, SOLUTION INTRAVENOUS at 17:00

## 2017-04-01 RX ADMIN — EPOETIN ALFA 5000 UNITS: 3000 SOLUTION INTRAVENOUS; SUBCUTANEOUS at 15:05

## 2017-04-01 RX ADMIN — Medication 2 LOZENGE: at 09:40

## 2017-04-01 RX ADMIN — OSELTAMIVIR PHOSPHATE 30 MG: 30 CAPSULE ORAL at 17:00

## 2017-04-01 RX ADMIN — INSULIN GLARGINE 30 UNITS: 100 INJECTION, SOLUTION SUBCUTANEOUS at 09:31

## 2017-04-01 RX ADMIN — MYCOPHENOLATE MOFETIL 500 MG: 250 CAPSULE ORAL at 21:20

## 2017-04-01 RX ADMIN — CARVEDILOL 12.5 MG: 12.5 TABLET, FILM COATED ORAL at 08:22

## 2017-04-01 RX ADMIN — CARVEDILOL 12.5 MG: 12.5 TABLET, FILM COATED ORAL at 17:01

## 2017-04-01 RX ADMIN — DARUNAVIR 800 MG: 800 TABLET, FILM COATED ORAL at 21:32

## 2017-04-01 RX ADMIN — CALCIUM ACETATE 2001 MG: 667 CAPSULE ORAL at 12:08

## 2017-04-01 RX ADMIN — ASPIRIN 81 MG CHEWABLE TABLET 81 MG: 81 TABLET CHEWABLE at 21:22

## 2017-04-01 RX ADMIN — SODIUM CHLORIDE 1000 ML: 9 INJECTION, SOLUTION INTRAVENOUS at 12:00

## 2017-04-01 RX ADMIN — Medication 1 CAPSULE: at 21:20

## 2017-04-01 RX ADMIN — ISOSORBIDE MONONITRATE 60 MG: 30 TABLET, EXTENDED RELEASE ORAL at 21:20

## 2017-04-01 RX ADMIN — DOLUTEGRAVIR SODIUM 50 MG: 50 TABLET, FILM COATED ORAL at 21:21

## 2017-04-01 RX ADMIN — HYDRALAZINE HYDROCHLORIDE 25 MG: 25 TABLET ORAL at 21:28

## 2017-04-01 RX ADMIN — MAGNESIUM OXIDE TAB 400 MG (241.3 MG ELEMENTAL MG) 400 MG: 400 (241.3 MG) TAB at 21:22

## 2017-04-01 RX ADMIN — HYDRALAZINE HYDROCHLORIDE 25 MG: 25 TABLET ORAL at 16:55

## 2017-04-01 RX ADMIN — ABACAVIR 600 MG: 300 TABLET, FILM COATED ORAL at 21:19

## 2017-04-01 RX ADMIN — GUAIFENESIN 10 ML: 100 SOLUTION ORAL at 21:16

## 2017-04-01 RX ADMIN — LOSARTAN POTASSIUM 100 MG: 100 TABLET, FILM COATED ORAL at 21:20

## 2017-04-01 RX ADMIN — VANCOMYCIN HYDROCHLORIDE 750 MG: 10 INJECTION, POWDER, LYOPHILIZED, FOR SOLUTION INTRAVENOUS at 18:34

## 2017-04-01 RX ADMIN — GABAPENTIN 300 MG: 300 CAPSULE ORAL at 08:22

## 2017-04-01 RX ADMIN — CALCIUM ACETATE 2001 MG: 667 CAPSULE ORAL at 08:22

## 2017-04-01 RX ADMIN — Medication 2 LOZENGE: at 18:50

## 2017-04-01 RX ADMIN — CLOPIDOGREL 75 MG: 75 TABLET, FILM COATED ORAL at 21:20

## 2017-04-01 RX ADMIN — TAZOBACTAM SODIUM AND PIPERACILLIN SODIUM 2.25 G: 250; 2 INJECTION, SOLUTION INTRAVENOUS at 03:56

## 2017-04-01 RX ADMIN — GABAPENTIN 600 MG: 300 CAPSULE ORAL at 21:24

## 2017-04-01 RX ADMIN — ATORVASTATIN CALCIUM 40 MG: 40 TABLET, FILM COATED ORAL at 21:19

## 2017-04-01 RX ADMIN — MYCOPHENOLATE MOFETIL 500 MG: 250 CAPSULE ORAL at 09:31

## 2017-04-01 RX ADMIN — HYDRALAZINE HYDROCHLORIDE 25 MG: 25 TABLET ORAL at 08:23

## 2017-04-01 RX ADMIN — DAPSONE 100 MG: 25 TABLET ORAL at 21:24

## 2017-04-01 RX ADMIN — AMLODIPINE BESYLATE 5 MG: 5 TABLET ORAL at 21:23

## 2017-04-01 RX ADMIN — Medication 2 LOZENGE: at 00:56

## 2017-04-01 RX ADMIN — INSULIN GLARGINE 5 UNITS: 100 INJECTION, SOLUTION SUBCUTANEOUS at 12:29

## 2017-04-01 RX ADMIN — RITONAVIR 100 MG: 100 TABLET, FILM COATED ORAL at 21:20

## 2017-04-01 RX ADMIN — OMEPRAZOLE 40 MG: 20 CAPSULE, DELAYED RELEASE ORAL at 08:23

## 2017-04-01 RX ADMIN — CALCIUM ACETATE 2001 MG: 667 CAPSULE ORAL at 17:00

## 2017-04-01 NOTE — PROGRESS NOTES
Lab Results   Component Value Date    POTASSIUM 4.3 04/01/2017     Lab Results   Component Value Date    HGB 7.5 04/01/2017          Hemodialysis Note:      All machine safety checks completed and passed.  Total chlorine checks less than 0.1ppm   All connections secured, saline line double clamped.  Venous and arterial parameters set  Report rec'd from Simin Mueller RN    Rec'd pt per w/c acc'd by transport team. Consent obtained for dialysis Rx this hospitalization.  Good circulation to fistula arm. Time out taken.   LAF cannulated with 15 Ga needles with no difficulty. Tourniquet used.     Pt ran 3.5 hours on a K 3 bath, with 2.5L removed. Blood flow rate of 400 ml/min was obtained.   PRE-WEIGHT:86.5kgs,    TARGET WEIGHT 83.5kgs,     POST-WT 84kgs.      Complications: None.   Education regarding ESRD given to patient with good understanding.     Vascular Access: Aseptic prep done for both on/off  Total Heparin given: 0 units    Meds given: Epogen.       Transducers checked Q 15 minutes, remain clear throughout Rx.  Machine water alarms in place and functioning.  Total blood volume processed:88.5L  Hemostasis of needle sites achieved after 15 minutes. Patient dialyzes at [place] Q T-Th-Sat.  POST ASSESSMENTS: Skin warm and dry, alert, denies discomfort, Lungscourse, Resp rate regular, apical rate regular. No edema.  See flowsheet in EPIC for further details.  Report given to Enoch Santana RN.   Gisel Garcia RN  [facility]

## 2017-04-01 NOTE — PROGRESS NOTES
" Renal Medicine Progress Note            Assessment/Plan:     1. ESRD  2. Influenza  3. PNA  4. HIV  5. HTN  6. Anemia  7. Recurrent thrombocytopenia?    Plan  1. Hemodialysis today. Epogen with HD. No heparin with HD          Interval History:     \"I feel a little better,\" he says. He is still having a dry cough.           Medications and Allergies:       sodium chloride 0.9%  250 mL Hemodialysis Machine Once     sodium chloride 0.9%  250-1,000 mL Intravenous Once in dialysis     epoetin brittni (EPOGEN,PROCRIT) inj ESRD  5,000 Units Intravenous See Admin Instructions     heparin (porcine)  500 Units Hemodialysis Machine Once in dialysis     - MEDICATION INSTRUCTIONS for Dialysis Patients -   Does not apply See Admin Instructions     vancomycin place koenig - receiving intermittent dosing  1 each Does not apply See Admin Instructions     omeprazole  40 mg Oral Daily     magnesium oxide (MAG-OX) tablet 400 mg  400 mg Oral At Bedtime     losartan  100 mg Oral At Bedtime     hydrALAZINE  25 mg Oral TID     gabapentin  600 mg Oral QPM     B complex-C-folic acid  1 capsule Oral QPM     amLODIPine  5 mg Oral At Bedtime     insulin aspart  1-10 Units Subcutaneous TID AC     insulin aspart  1-7 Units Subcutaneous At Bedtime     mycophenolate  500 mg Oral BID     oseltamivir  30 mg Oral Once per day on Tue Thu Sat     aspirin chewable tablet 81 mg  81 mg Oral QPM     atorvastatin  40 mg Oral At Bedtime     calcium acetate  2,001 mg Oral TID w/meals     carvedilol  12.5 mg Oral BID w/meals     clopidogrel (PLAVIX) tablet 75 mg  75 mg Oral QPM     dapsone  100 mg Oral At Bedtime     dolutegravir  50 mg Oral At Bedtime     darunavir  800 mg Oral At Bedtime     gabapentin  300 mg Oral QAM     insulin glargine  30 Units Subcutaneous QAM     insulin aspart  5 Units Subcutaneous TID AC     isosorbide mononitrate  60 mg Oral QPM     ritonavir  100 mg Oral At Bedtime     piperacillin-tazobactam  2.25 g Intravenous Q6H     abacavir  600 " "mg Oral QPM        Allergies   Allergen Reactions     Lisinopril      Sulfa Drugs             Physical Exam:   Vitals were reviewed  Heart Rate: 69, Blood pressure 142/70, pulse 71, temperature 97.7  F (36.5  C), temperature source Oral, resp. rate 20, height 1.803 m (5' 11\"), weight 86.5 kg (190 lb 11.2 oz), SpO2 96 %.    Wt Readings from Last 3 Encounters:   04/01/17 86.5 kg (190 lb 11.2 oz)   01/29/17 79.9 kg (176 lb 3.2 oz)   01/22/17 82.6 kg (182 lb 1.6 oz)       Intake/Output Summary (Last 24 hours) at 04/01/17 1048  Last data filed at 03/31/17 1300   Gross per 24 hour   Intake              480 ml   Output                0 ml   Net              480 ml       GENERAL APPEARANCE: pleasant, NAD, alert  HEENT:  Eyes/ears/nose/neck grossly normal  RESP: BS diminish R base. No wheezes  CV: RRR, nl S1/S2, + murmur  ABDOMEN: soft, NT  EXTREMITIES/SKIN: no rashes/lesions on observed skin; no edema  LAVF          Data:     CBC RESULTS:     Recent Labs  Lab 04/01/17  0715 03/31/17  0640 03/29/17  0620 03/28/17 1953   WBC 2.9* 3.9* 4.2 4.4   RBC 2.51* 2.48* 2.22* 2.32*   HGB 7.5* 7.3* 6.5* 6.9*   HCT 23.3* 23.4* 21.3* 21.8*   PLT 81* 79* 93* 109*       Basic Metabolic Panel:    Recent Labs  Lab 04/01/17  0715 03/31/17  0640 03/29/17  0620 03/28/17 1953    138 135 135   POTASSIUM 4.3 4.4 4.3 4.0   CHLORIDE 100 101 97 95   CO2 27 28 28 28   BUN 49* 31* 36* 29   CR 7.18* 5.11* 5.50* 4.61*   * 261* 169* 303*   PRABHA 8.7 8.1* 8.6 9.0       INRNo lab results found in last 7 days.   Attestation:   I have reviewed today's relevant vital signs, notes, medications, labs and imaging.    Doyle Marcial MD  [facility] - Nephrology  Office phone :[phone]  Pager: [phone]  "

## 2017-04-01 NOTE — PROGRESS NOTES
[facility]  Hospitalist Progress Note  Christian Fletcher DO MPH 04/01/2017    Reason for Stay (Diagnosis): Flu, PNA         Assessment and Plan:      Summary of Stay: Donald Raza is a 67 year old male with a history of DM2, HIV, TIA, ESRD on HD, CAD, HTN admitted on 3/28/2017 with fever, cough, and weakness ultimately dx with influenza and PNA and started on tamiflu and vanco/zosyn, respectively.    Problem List:   1. Influenza: Continue Tamiflu, dosing after HD per ID.  2. PNA: Procal elevated to about 5, infiltrate on CXR but recent HCAP. Continue vanco/zosyn for now. Clinically improving. ID appears to indicate continuation of abx through the weekend.  3. ESRD on HD qTRS s/p failed transplant: No issues. Renal consulted. Continue Cellcept.  4. Acute on chronic anemia and TCP: No obvious bleeding. Unclear etiology, likely multifactorial. Recieved 2 U PRBC 3/30 and needs irradiated products. Has undergone GI w/u with recent neg EGD. Hb stable in 7s. Monitor daily. Given recurrent problem with anemia and TCP will ask for hematology evaluation.  5. CAD s/p PCI with stenting (within the past year): Stable, no CP. On DAP. Continue ASA 81, plavix, atorvastatin 40, coreg 12.5 bid, imdur 60 daily. Recent cath from Jan reviewed.  6. HIV: Appreciate ID input. Not likely active issue. Continue PTA norvir, tivicay, prezista, dapsone.  7. Hx of TIA: Stable, continue atorvastatin and ASA/plavix.  8. HTN: BP reasonable. HD today. Continue coreg, norvasc 5, hydralazine 25 tid, imdur, losartan 100.  9. DM: BG still elevated today but was previously well controlled on lantus 30 U am, high SSI, 5 U WM. Increase lantus to 35.  10. Non-exertional chest pain: Resolved. Does not sound cardiac. Undetectable trops, non concerning EKG. Recent cath reviewed, no significant obstructive vessels. Doubt PE, dissection. More likely MSK vs pleurisy from cough and infection.    DVT Prophylaxis: Pneumatic Compression Devices  Code Status:  Full Code  Disposition: Expected discharge Mon or Tue to home.        Interval History (Subjective):      Pt doing well. No chest pain or shortness of breath. No nausea, vomiting, diarrhea, constipation. No fevers. No other complaints identified.                   Physical Exam:      Vital Signs:  Temp: 97.7  F (36.5  C) Temp src: Oral BP: 142/70   Heart Rate: 69 Resp: 20 SpO2: 96 % O2 Device: None (Room air) Oxygen Delivery: 2 LPM  Vitals:    03/30/17 0500 03/31/17 0311 04/01/17 0724   Weight: 81.4 kg (179 lb 6.4 oz) 83.8 kg (184 lb 11.9 oz) 86.5 kg (190 lb 11.2 oz)     Vital Signs with Ranges  Temp:  [97.6  F (36.4  C)-98.6  F (37  C)] 97.7  F (36.5  C)  Heart Rate:  [67-83] 69  Resp:  [18-20] 20  BP: (132-175)/(62-80) 142/70  SpO2:  [95 %-98 %] 96 %  I/O last 3 completed shifts:  In: 960 [P.O.:960]  Out: 200 [Urine:200]    GENERAL: No apparent distress. Awake, alert, and fully oriented.  HEENT: Normocephalic, atraumatic. Extraocular movements intact.  CARDIOVASCULAR: Regular rate and rhythm without murmurs or rubs. No S3.  PULMONARY: Clear bilaterally.  ABDOMINAL: Soft, non-tender, non-distended. Bowel sounds normoactive. No hepatosplenomegaly.  EXTREMITIES: No cyanosis or clubbing. No edema.  NEUROLOGICAL: CN 2-12 grossly intact, no focal neurological deficits.  DERMATOLOGICAL: No rash, ulcer, ecchymoses, jaundice.         Medications:      All current medications were reviewed with changes reflected in problem list.         Data:      All new lab, EKGs, telemetry strips, and imaging data was personally reviewed.   Labs:    Recent Labs  Lab 04/01/17  0715 03/31/17  0640 03/29/17  0620   WBC 2.9* 3.9* 4.2   HGB 7.5* 7.3* 6.5*   HCT 23.3* 23.4* 21.3*   MCV 93 94 96   PLT 81* 79* 93*       Recent Labs  Lab 04/01/17  0715 03/31/17  0640 03/29/17  0620    138 135   POTASSIUM 4.3 4.4 4.3   CHLORIDE 100 101 97   CO2 27 28 28   ANIONGAP 11 9 10   * 261* 169*   BUN 49* 31* 36*   CR 7.18* 5.11* 5.50*    GFRESTIMATED 8* 11* 10*   GFRESTBLACK 9* 14* 13*   PRABHA 8.7 8.1* 8.6     Imaging (past 24 hours):   No results found for this or any previous visit (from the past 24 hour(s)).    Christian Fletcher DO MPH  [facility] Hospitalist  [address] [facility] [place] [address]  Pager: [phone]  04/01/2017

## 2017-04-01 NOTE — PLAN OF CARE
Problem: Goal Outcome Summary  Goal: Goal Outcome Summary  Outcome: No Change  Pt slept well during the night. AO x4 VSS on 2L O2. Denies any pain or chest pain. Transfers to the BR with ax1. One loose BM this shift. Blood sugar this shift was 281. On Vanco, Sunsyn. Has L upper arm fistula. + Bruit/thrill.  Tele reads SR. Plan is to have HD today. Will continue to monitor.

## 2017-04-01 NOTE — PHARMACY-VANCOMYCIN DOSING SERVICE
Pharmacy Vancomycin Note  Date of Service 2017  Patient's  1950   67 year old, male    Indication: Healthcare-Associated Pneumonia  Goal Trough Level: 15-20 mg/L  Day of Therapy: 5  Current Vancomycin regimen: Intermittent dosing in chronic HD pt.    Current estimated CrCl = Estimated Creatinine Clearance: 11.9 mL/min (based on Cr of 7.18).    Creatinine for last 3 days  3/31/2017:  6:40 AM Creatinine 5.11 mg/dL  2017:  7:15 AM Creatinine 7.18 mg/dL    Recent Vancomycin Levels (past 3 days)  3/30/2017:  5:05 AM Vancomycin Level 20.0 mg/L  2017:  7:15 AM Vancomycin Level 20.2 mg/L    Vancomycin IV Administrations (past 72 hours)                   vancomycin (VANCOCIN) 750 mg in NaCl 0.9 % 250 mL intermittent infusion (mg) 750 mg New Bag 17 1713                Nephrotoxins and other renal medications (Future)    Start     Dose/Rate Route Frequency Ordered Stop    17 0400  piperacillin-tazobactam (ZOSYN) infusion 2.25 g      2.25 g  100 mL/hr over 30 Minutes Intravenous EVERY 6 HOURS 17 2325      17 0025  vancomycin place koenig - receiving intermittent dosing      1 each Does not apply SEE ADMIN INSTRUCTIONS 17 0025               Contrast Orders - past 72 hours     None          Interpretation of levels and current regimen:  Trough level is  Therapeutic  Renal Function: ESRD on Dialysis    Plan:  1.  Per Protocol, one time dose of ~10mg/kg (750mg) tonight post HD.  2.  Pharmacy will check trough levels as appropriate in 3-5 Days (prior to next HD).   3. Serum creatinine levels will be ordered per MD given chronic HD pt..      Adriana Tolbert        .

## 2017-04-01 NOTE — CONSULTS
Hematology Consult Note    Attempted to see the patient, but he was not in his room. Will see him tomorrow.     Serge Alex M.D.  [facility]  4/1/2017   4:10 PM

## 2017-04-01 NOTE — PLAN OF CARE
Problem: Goal Outcome Summary  Goal: Goal Outcome Summary  Outcome: No Change  VSS  Neuro: AOX4  Respiratory: LS Diminished on NC 2L  Cardiac: SR   GI: no n/v/d no BM noted  : SAGE AVF +ve bruit/thrill, ARMAND  Skin: Intact  Labs: K+4.4, Hgb 7.3  B, 263  Plan: iv abx, SSI,dialysis in am  Pt calls appropriately, up with A X 1, POC reviewed with pt and voiced no concerns addressed this shift.

## 2017-04-01 NOTE — PLAN OF CARE
Problem: Goal Outcome Summary  Goal: Goal Outcome Summary  Outcome: No Change  Dx: Influenza A +, PNA     Vitals: Temp: 97.2  F (36.2  C) Temp src: Axillary BP: 144/75 Pulse: 70 Heart Rate: 69 Resp: 20 SpO2: 97 % O2 Device: None (Room air) Oxygen Delivery: 2 LPM     Pain: Denies. Denies chest pain/discomfort.   Neuro: A&Ox4, BLE numbness-baseline  Cardiac: Tele: SR with occasional PVC's.   Resp: 2L NC. LS: dim.   GI/: Voiding, BM today.   Diet: Diabetic, Dialysis, regular with supplemental snacks.   Labs:K+ 4.3. Crt 7.18. WBC 2.9. Hgb 7.5. Mag 2.2. plt 81. , 242.  Skin: Left arm with HD fistula. Scabbing.   Activity: Up with assist x1/SBA.  IV: Right PIV SL'd.      PLAN: HD today with renal dosed Tamiflu after. IV abx. Blood cx pending.

## 2017-04-02 LAB
ANION GAP SERPL CALCULATED.3IONS-SCNC: 10 MMOL/L (ref 3–14)
BUN SERPL-MCNC: 32 MG/DL (ref 7–30)
CALCIUM SERPL-MCNC: 8.5 MG/DL (ref 8.5–10.1)
CHLORIDE SERPL-SCNC: 104 MMOL/L (ref 94–109)
CO2 SERPL-SCNC: 27 MMOL/L (ref 20–32)
CREAT SERPL-MCNC: 5.03 MG/DL (ref 0.66–1.25)
ERYTHROCYTE [DISTWIDTH] IN BLOOD BY AUTOMATED COUNT: 14.6 % (ref 10–15)
GFR SERPL CREATININE-BSD FRML MDRD: 11 ML/MIN/1.7M2
GLUCOSE BLDC GLUCOMTR-MCNC: 153 MG/DL (ref 70–99)
GLUCOSE BLDC GLUCOMTR-MCNC: 190 MG/DL (ref 70–99)
GLUCOSE BLDC GLUCOMTR-MCNC: 217 MG/DL (ref 70–99)
GLUCOSE BLDC GLUCOMTR-MCNC: 222 MG/DL (ref 70–99)
GLUCOSE BLDC GLUCOMTR-MCNC: 242 MG/DL (ref 70–99)
GLUCOSE SERPL-MCNC: 189 MG/DL (ref 70–99)
HCT VFR BLD AUTO: 23.5 % (ref 40–53)
HGB BLD-MCNC: 7.4 G/DL (ref 13.3–17.7)
MCH RBC QN AUTO: 29.4 PG (ref 26.5–33)
MCHC RBC AUTO-ENTMCNC: 31.5 G/DL (ref 31.5–36.5)
MCV RBC AUTO: 93 FL (ref 78–100)
PLATELET # BLD AUTO: 100 10E9/L (ref 150–450)
POTASSIUM SERPL-SCNC: 3.9 MMOL/L (ref 3.4–5.3)
RBC # BLD AUTO: 2.52 10E12/L (ref 4.4–5.9)
SODIUM SERPL-SCNC: 141 MMOL/L (ref 133–144)
WBC # BLD AUTO: 3 10E9/L (ref 4–11)

## 2017-04-02 PROCEDURE — 00000146 ZZHCL STATISTIC GLUCOSE BY METER IP

## 2017-04-02 PROCEDURE — 25000132 ZZH RX MED GY IP 250 OP 250 PS 637: Mod: GY | Performed by: INTERNAL MEDICINE

## 2017-04-02 PROCEDURE — A9270 NON-COVERED ITEM OR SERVICE: HCPCS | Mod: GY | Performed by: INTERNAL MEDICINE

## 2017-04-02 PROCEDURE — 99233 SBSQ HOSP IP/OBS HIGH 50: CPT | Performed by: HOSPITALIST

## 2017-04-02 PROCEDURE — 25000131 ZZH RX MED GY IP 250 OP 636 PS 637: Mod: GY

## 2017-04-02 PROCEDURE — 25000128 H RX IP 250 OP 636: Performed by: INTERNAL MEDICINE

## 2017-04-02 PROCEDURE — 85027 COMPLETE CBC AUTOMATED: CPT | Performed by: HOSPITALIST

## 2017-04-02 PROCEDURE — 25000131 ZZH RX MED GY IP 250 OP 636 PS 637: Mod: GY | Performed by: HOSPITALIST

## 2017-04-02 PROCEDURE — 80048 BASIC METABOLIC PNL TOTAL CA: CPT | Performed by: HOSPITALIST

## 2017-04-02 PROCEDURE — 12000007 ZZH R&B INTERMEDIATE

## 2017-04-02 PROCEDURE — 36415 COLL VENOUS BLD VENIPUNCTURE: CPT | Performed by: HOSPITALIST

## 2017-04-02 PROCEDURE — 99207 ZZC CDG-MDM COMPONENT: MEETS HIGH - UP CODED: CPT | Performed by: HOSPITALIST

## 2017-04-02 RX ORDER — OSELTAMIVIR PHOSPHATE 30 MG/1
30 CAPSULE ORAL
Status: DISCONTINUED | OUTPATIENT
Start: 2017-04-04 | End: 2017-04-04

## 2017-04-02 RX ADMIN — CALCIUM ACETATE 2001 MG: 667 CAPSULE ORAL at 12:34

## 2017-04-02 RX ADMIN — CALCIUM ACETATE 2001 MG: 667 CAPSULE ORAL at 09:01

## 2017-04-02 RX ADMIN — DARUNAVIR 800 MG: 800 TABLET, FILM COATED ORAL at 21:24

## 2017-04-02 RX ADMIN — MYCOPHENOLATE MOFETIL 500 MG: 250 CAPSULE ORAL at 09:01

## 2017-04-02 RX ADMIN — DOLUTEGRAVIR SODIUM 50 MG: 50 TABLET, FILM COATED ORAL at 21:22

## 2017-04-02 RX ADMIN — MYCOPHENOLATE MOFETIL 500 MG: 250 CAPSULE ORAL at 21:23

## 2017-04-02 RX ADMIN — HYDRALAZINE HYDROCHLORIDE 25 MG: 25 TABLET ORAL at 09:02

## 2017-04-02 RX ADMIN — ABACAVIR 600 MG: 300 TABLET, FILM COATED ORAL at 21:23

## 2017-04-02 RX ADMIN — MAGNESIUM OXIDE TAB 400 MG (241.3 MG ELEMENTAL MG) 400 MG: 400 (241.3 MG) TAB at 21:23

## 2017-04-02 RX ADMIN — TAZOBACTAM SODIUM AND PIPERACILLIN SODIUM 2.25 G: 250; 2 INJECTION, SOLUTION INTRAVENOUS at 18:00

## 2017-04-02 RX ADMIN — INSULIN GLARGINE 35 UNITS: 100 INJECTION, SOLUTION SUBCUTANEOUS at 09:02

## 2017-04-02 RX ADMIN — HYDRALAZINE HYDROCHLORIDE 25 MG: 25 TABLET ORAL at 21:23

## 2017-04-02 RX ADMIN — ISOSORBIDE MONONITRATE 60 MG: 30 TABLET, EXTENDED RELEASE ORAL at 21:23

## 2017-04-02 RX ADMIN — CLONAZEPAM 0.5 MG: 0.5 TABLET ORAL at 02:19

## 2017-04-02 RX ADMIN — ASPIRIN 81 MG CHEWABLE TABLET 81 MG: 81 TABLET CHEWABLE at 21:22

## 2017-04-02 RX ADMIN — LOSARTAN POTASSIUM 100 MG: 100 TABLET, FILM COATED ORAL at 21:24

## 2017-04-02 RX ADMIN — TAZOBACTAM SODIUM AND PIPERACILLIN SODIUM 2.25 G: 250; 2 INJECTION, SOLUTION INTRAVENOUS at 05:57

## 2017-04-02 RX ADMIN — ACETAMINOPHEN 650 MG: 325 TABLET, FILM COATED ORAL at 01:39

## 2017-04-02 RX ADMIN — CLOPIDOGREL 75 MG: 75 TABLET, FILM COATED ORAL at 21:23

## 2017-04-02 RX ADMIN — CARVEDILOL 12.5 MG: 12.5 TABLET, FILM COATED ORAL at 18:00

## 2017-04-02 RX ADMIN — TAZOBACTAM SODIUM AND PIPERACILLIN SODIUM 2.25 G: 250; 2 INJECTION, SOLUTION INTRAVENOUS at 12:34

## 2017-04-02 RX ADMIN — RITONAVIR 100 MG: 100 TABLET, FILM COATED ORAL at 21:24

## 2017-04-02 RX ADMIN — GABAPENTIN 300 MG: 300 CAPSULE ORAL at 09:02

## 2017-04-02 RX ADMIN — Medication 2 LOZENGE: at 01:23

## 2017-04-02 RX ADMIN — Medication 2 LOZENGE: at 10:43

## 2017-04-02 RX ADMIN — DAPSONE 100 MG: 25 TABLET ORAL at 21:23

## 2017-04-02 RX ADMIN — AMLODIPINE BESYLATE 5 MG: 5 TABLET ORAL at 21:24

## 2017-04-02 RX ADMIN — Medication 1 CAPSULE: at 21:24

## 2017-04-02 RX ADMIN — OMEPRAZOLE 40 MG: 20 CAPSULE, DELAYED RELEASE ORAL at 09:01

## 2017-04-02 RX ADMIN — CALCIUM ACETATE 2001 MG: 667 CAPSULE ORAL at 18:00

## 2017-04-02 RX ADMIN — GABAPENTIN 600 MG: 300 CAPSULE ORAL at 21:23

## 2017-04-02 RX ADMIN — ATORVASTATIN CALCIUM 40 MG: 40 TABLET, FILM COATED ORAL at 21:24

## 2017-04-02 RX ADMIN — CARVEDILOL 12.5 MG: 12.5 TABLET, FILM COATED ORAL at 09:02

## 2017-04-02 RX ADMIN — HYDRALAZINE HYDROCHLORIDE 25 MG: 25 TABLET ORAL at 16:16

## 2017-04-02 RX ADMIN — Medication 2 LOZENGE: at 02:19

## 2017-04-02 NOTE — CONSULTS
HEMATOLOGY CONSULTATION      REFERRING PROVIDER:   Christian Fletcher M.D.      REASON FOR CONSULTATION:   Mr. Donald Raza is a 67-year-old male who is referred by Dr. Fletcher for evaluation of his chronic anemia and intermittent thrombocytopenia.      HISTORY OF PRESENT ILLNESS:  The patient has  a history of diabetes and end-stage renal disease, requiring hemodialysis over the past few years.  He has been treated with Epogen during his hemodialysis sessions, but he remains anemic and he has required occasional red cell transfusions over the past year.  He also has a history of HIV, and he has been maintained on antiretroviral medications for many years.  Laboratory studies dating back to 2014 revealed mild thrombocytopenia, with his platelet count occasionally dropping below 100,000.  He was hospitalized for pneumonia and respiratory failure in January 2017, at which time his platelet count ranged between 70,000 and 90,000 for several days.  Review of his peripheral blood morphology at that time revealed a mild leukoerythroblastic picture with occasional nucleated red cells and intermediate myeloid precursors.  The patient had no significant bleeding symptoms, and he recovered fully from his infection. He was stable until last week when he developed a fever, cough, myalgias, and weakness.  Laboratory testing was positive for influenza A, and he has been treated with Tamiflu over the past few days.      Laboratory studies on admission revealed an increasing anemia, with a hemoglobin of 6.9 and a stable MCV of 94.  His platelet count was 109,000 on admission, but dropped to 79,000 a few days ago.  His white blood count also dropped below normal.  He received red cell transfusions, and he has been feeling generally better over the past few days.  He has had no recent bleeding complications associated with his thrombocytopenia.  He is now referred for further evaluation of his hematologic abnormalities.      REVIEW OF  SYSTEMS:  The patient developed weakness, fevers, chills, and generalized myalgias just prior to admission.  His symptoms have gradually improved over the past few days.  He denies recent bleeding symptoms.  He has tolerated his dialysis well recently.  The remainder of his review of systems was negative.      PAST MEDICAL HISTORY:  Remarkable for end-stage renal disease on hemodialysis, diabetes, coronary artery disease, hyperlipidemia, and hypertension.  He has a history of a non-ST elevation myocardial infarction and a prior TIA.  He was hospitalized for a few weeks in January 2017 for pneumonia and respiratory failure, at which time he required mechanical ventilation.      PAST SURGICAL HISTORY:  Includes coronary artery stent placement, appendectomy, and a laparoscopic cholecystectomy in the past.      FAMILY HISTORY:  Negative for hematologic disorders.      SOCIAL HISTORY:  The patient quit smoking in 2002, and he denies recent alcohol use.      PHYSICAL EXAMINATION:   GENERAL:  The patient was an older middle-aged male, who appeared comfortable and in no acute distress.   He was alert and fully oriented.  He was able to answer questions appropriately.   HEENT:  Sclerae were anicteric and conjunctivae were somewhat pale.  Ears and nose appeared normal.  Oral cavity revealed no lesions.   NECK:  There were no palpable neck masses or thyromegaly.  No cervical or supraclavicular lymph nodes were palpable.   LUNGS:  Revealed scattered rales at both lung bases.   HEART:  No murmurs.   ABDOMEN:  Soft and nontender, without hepatomegaly or splenomegaly.  There were no palpable abdominal masses, and there was no evidence of ascites.   EXTREMITIES:  Lower extremities revealed no edema or tenderness.   NEUROLOGIC:  Nonfocal.   SKIN:  No suspicious lesions.      LABORATORY DATA:  Laboratory studies on admission revealed a white blood count of 4400, with a normal white cell differential.  Hemoglobin was 6.9 with an MCV  of 94.  Platelet count 109,000.  His platelet count dropped to 79,000 a few days ago, but a repeat CBC today revealed a platelet count of 100,000.  His hemoglobin was 7.4, and a white blood count was 3000.      IMPRESSION/PLAN:  A 67-year-old male with end-stage renal disease on hemodialysis and treatment with erythropoietin, also found to be mildly thrombocytopenic over the past few years.  We discussed possible causes of his hematologic abnormalities.  His anemia may be related to chronic renal disease, although he may also have a component of anemia of chronic disease or myelosuppression from medications.  His antiretroviral medications may be responsible for his mild thrombocytopenia and may also be contributing to his anemia, although the possibility of a mild immune thrombocytopenia cannot be excluded.  His current medications include mycophenolate, ritonavir and darunavir all which may be associated with cytopenias.  His peripheral blood morphology earlier this year revealed a mild leukoerythroblastic picture with intermediate myeloid precursors and small numbers of nucleated red cells, probably related to his severe infection at that time.  His peripheral blood smear will be reviewed again to look for persistence of leukoerythroblastic changes, and if he still has evidence of nucleated red cells or myelocytes in his peripheral blood, he may require a bone marrow biopsy to evaluate for an underlying bone marrow disorder.  In the meantime, he should be continued on erythropoietin for his anemia of chronic renal disease, and he may still require occasional transfusion support.      The situation was reviewed with the patient, and I will follow up once his peripheral blood morphology has been reviewed.  If he is discharged in the next couple of days, he can follow up in my office as needed.      Thank you very much for allowing me to assist in this patient's care.         LACEY MARTINEZ MD    [facility]        D: 2017 15:35   T: 2017 16:13   MT: [id number]      Name:     GREGORIO BRUSH   MRN:      [id number]        Account:       [id number]   :      1950           Consult Date:  2017      Document: [id number]       cc: Christian Fletcher MD

## 2017-04-02 NOTE — PLAN OF CARE
Problem: Goal Outcome Summary  Goal: Goal Outcome Summary  Outcome: No Change  VSS  Neuro: AOX4  Respiratory: LS Diminished on RA  Cardiac: SR   GI: no n/v/d no BM noted  : SAGE AVF +ve bruit/thrill, dsg CDI.  Skin: Intact  Labs: K+4.4, Hgb 7.5  B, 217  Plan: iv abx, SSI  Pt calls appropriately, up with SBA, POC reviewed with pt and voices understanding. Dialysis run today with 2.5 kgs out.

## 2017-04-02 NOTE — PLAN OF CARE
Problem: Goal Outcome Summary  Goal: Goal Outcome Summary  Outcome: No Change  VSS. Tele: SR with occasional PVC's, slight ST elevation. LS: dim/crackles. Hgb 7.4. , 222. Continues on IV abx for PNA.

## 2017-04-02 NOTE — PROGRESS NOTES
[facility]  Hospitalist Progress Note  Christian Fletcher DO MPH 04/02/2017    Reason for Stay (Diagnosis): Flu, PNA         Assessment and Plan:      Summary of Stay: Donald Raza is a 67 year old male with a history of DM2, HIV, TIA, ESRD on HD, CAD, HTN admitted on 3/28/2017 with fever, cough, and weakness ultimately dx with influenza and PNA and started on tamiflu and vanco/zosyn, respectively.    Problem List:   1. Influenza: Continue Tamiflu, dosing after HD per ID.  2. PNA: Procal elevated to about 5, infiltrate on CXR but recent HCAP. Continue vanco/zosyn for now. Clinically improving. ID appears to indicate continuation of abx through the weekend.  3. ESRD on HD qTRS s/p failed transplant: No issues. Renal consulted. Continue Cellcept.  4. Acute on chronic anemia and TCP: No obvious bleeding. Unclear etiology, likely multifactorial. Recieved 2 U PRBC 3/30 and needs irradiated products. Has undergone GI w/u with recent neg EGD. Hb stable in 7s. Monitor daily. Given recurrent problem with anemia and TCP will ask for hematology evaluation.  5. CAD s/p PCI with stenting (within the past year): Stable, no CP. On DAP. Continue ASA 81, plavix, atorvastatin 40, coreg 12.5 bid, imdur 60 daily. Recent cath from Jan reviewed.  6. HIV: Appreciate ID input. Not likely active issue. Continue PTA norvir, tivicay, prezista, dapsone.  7. Hx of TIA: Stable, continue atorvastatin and ASA/plavix.  8. HTN: BP reasonable. HD today. Continue coreg, norvasc 5, hydralazine 25 tid, imdur, losartan 100.  9. DM: BG still elevated today but was previously well controlled on lantus 30 U am, high SSI, 5 U WM. Increase lantus to 35.  10. Non-exertional chest pain: Resolved. Does not sound cardiac. Undetectable trops, non concerning EKG. Recent cath reviewed, no significant obstructive vessels. Doubt PE, dissection. More likely MSK vs pleurisy from cough and infection.    DVT Prophylaxis: Pneumatic Compression Devices  Code Status:  Full Code  Disposition: Expected discharge Mon or Tue to home.        Interval History (Subjective):      Pt doing well. No chest pain or shortness of breath. No nausea, vomiting, diarrhea, constipation. No fevers. No other complaints identified.                   Physical Exam:      Vital Signs:  Temp: 97.3  F (36.3  C) Temp src: Oral BP: 141/66 Pulse: 72 Heart Rate: 71 Resp: 20 SpO2: 93 % O2 Device: None (Room air)    Vitals:    03/30/17 0500 03/31/17 0311 04/01/17 0724   Weight: 81.4 kg (179 lb 6.4 oz) 83.8 kg (184 lb 11.9 oz) 86.5 kg (190 lb 11.2 oz)     Vital Signs with Ranges  Temp:  [97.2  F (36.2  C)-97.6  F (36.4  C)] 97.3  F (36.3  C)  Pulse:  [68-78] 72  Heart Rate:  [71-79] 71  Resp:  [18-20] 20  BP: (138-169)/(61-86) 141/66  SpO2:  [93 %-99 %] 93 %  I/O last 3 completed shifts:  In: 720 [P.O.:720]  Out: 2500 [Other:2500]    GENERAL: No apparent distress. Awake, alert, and fully oriented.  HEENT: Normocephalic, atraumatic. Extraocular movements intact.  CARDIOVASCULAR: Regular rate and rhythm without murmurs or rubs. No S3.  PULMONARY: Clear bilaterally.  ABDOMINAL: Soft, non-tender, non-distended. Bowel sounds normoactive. No hepatosplenomegaly.  EXTREMITIES: No cyanosis or clubbing. No edema.  NEUROLOGICAL: CN 2-12 grossly intact, no focal neurological deficits.  DERMATOLOGICAL: No rash, ulcer, ecchymoses, jaundice.         Medications:      All current medications were reviewed with changes reflected in problem list.         Data:      All new lab, EKGs, telemetry strips, and imaging data was personally reviewed.   Labs:    Recent Labs  Lab 04/02/17  0630 04/01/17  0715 03/31/17  0640   WBC 3.0* 2.9* 3.9*   HGB 7.4* 7.5* 7.3*   HCT 23.5* 23.3* 23.4*   MCV 93 93 94   * 81* 79*       Recent Labs  Lab 04/02/17  0630 04/01/17  0715 03/31/17  0640    138 138   POTASSIUM 3.9 4.3 4.4   CHLORIDE 104 100 101   CO2 27 27 28   ANIONGAP 10 11 9   * 214* 261*   BUN 32* 49* 31*   CR 5.03* 7.18*  5.11*   GFRESTIMATED 11* 8* 11*   GFRESTBLACK 14* 9* 14*   PRABHA 8.5 8.7 8.1*     Imaging (past 24 hours):   No results found for this or any previous visit (from the past 24 hour(s)).    Christian Fletcher DO MPH  [facility] Hospitalist  [address] [place] [address]  Pager: [phone]  04/02/2017

## 2017-04-02 NOTE — PLAN OF CARE
Problem: Goal Outcome Summary  Goal: Goal Outcome Summary  Outcome: No Change  Patient Progress:  No Change  Outcome Summary:  Temp: 97.2  F (36.2  C) Temp src: Oral BP: 142/61 Pulse: 72 Heart Rate: 76 Resp: 20 SpO2: 94 % O2 Device: None (Room air) Oxygen Delivery: 2 LPM     Elevated BP, all other VSS. A&O x4, up with SBA. Remains on droplet precautions. Pt in bed all night. Received klonopin for restless legs. Plan for DC home Mon or Tues.

## 2017-04-03 LAB
ANION GAP SERPL CALCULATED.3IONS-SCNC: 11 MMOL/L (ref 3–14)
BACTERIA SPEC CULT: NO GROWTH
BACTERIA SPEC CULT: NO GROWTH
BASOPHILS # BLD AUTO: 0 10E9/L (ref 0–0.2)
BASOPHILS NFR BLD AUTO: 0.3 %
BUN SERPL-MCNC: 48 MG/DL (ref 7–30)
CALCIUM SERPL-MCNC: 8.6 MG/DL (ref 8.5–10.1)
CHLORIDE SERPL-SCNC: 106 MMOL/L (ref 94–109)
CO2 SERPL-SCNC: 25 MMOL/L (ref 20–32)
COPATH REPORT: NORMAL
CREAT SERPL-MCNC: 6.42 MG/DL (ref 0.66–1.25)
DIFFERENTIAL METHOD BLD: NORMAL
EOSINOPHIL # BLD AUTO: 0.2 10E9/L (ref 0–0.7)
EOSINOPHIL NFR BLD AUTO: 5.4 %
ERYTHROCYTE [DISTWIDTH] IN BLOOD BY AUTOMATED COUNT: 14.6 % (ref 10–15)
GFR SERPL CREATININE-BSD FRML MDRD: 9 ML/MIN/1.7M2
GLUCOSE BLDC GLUCOMTR-MCNC: 127 MG/DL (ref 70–99)
GLUCOSE BLDC GLUCOMTR-MCNC: 162 MG/DL (ref 70–99)
GLUCOSE BLDC GLUCOMTR-MCNC: 184 MG/DL (ref 70–99)
GLUCOSE BLDC GLUCOMTR-MCNC: 195 MG/DL (ref 70–99)
GLUCOSE BLDC GLUCOMTR-MCNC: 243 MG/DL (ref 70–99)
GLUCOSE SERPL-MCNC: 155 MG/DL (ref 70–99)
HCT VFR BLD AUTO: 22.2 % (ref 40–53)
HGB BLD-MCNC: 7 G/DL (ref 13.3–17.7)
IMM GRANULOCYTES # BLD: 0 10E9/L (ref 0–0.4)
IMM GRANULOCYTES NFR BLD: 0.6 %
LACTATE BLD-SCNC: 1 MMOL/L (ref 0.7–2.1)
LYMPHOCYTES # BLD AUTO: 1 10E9/L (ref 0.8–5.3)
LYMPHOCYTES NFR BLD AUTO: 31.3 %
Lab: NORMAL
Lab: NORMAL
MCH RBC QN AUTO: 29.5 PG (ref 26.5–33)
MCHC RBC AUTO-ENTMCNC: 31.5 G/DL (ref 31.5–36.5)
MCV RBC AUTO: 94 FL (ref 78–100)
MICRO REPORT STATUS: NORMAL
MICRO REPORT STATUS: NORMAL
MONOCYTES # BLD AUTO: 0.2 10E9/L (ref 0–1.3)
MONOCYTES NFR BLD AUTO: 7.7 %
NEUTROPHILS # BLD AUTO: 1.7 10E9/L (ref 1.6–8.3)
NEUTROPHILS NFR BLD AUTO: 54.7 %
NRBC # BLD AUTO: 0 10*3/UL
NRBC BLD AUTO-RTO: 0 /100
PLATELET # BLD AUTO: 100 10E9/L (ref 150–450)
POTASSIUM SERPL-SCNC: 4.4 MMOL/L (ref 3.4–5.3)
RBC # BLD AUTO: 2.37 10E12/L (ref 4.4–5.9)
RETICS # AUTO: 34.4 10E9/L (ref 25–95)
RETICS/RBC NFR AUTO: 1.5 % (ref 0.5–2)
SODIUM SERPL-SCNC: 142 MMOL/L (ref 133–144)
SPECIMEN SOURCE: NORMAL
SPECIMEN SOURCE: NORMAL
VANCOMYCIN SERPL-MCNC: 20.8 MG/L
WBC # BLD AUTO: 3.1 10E9/L (ref 4–11)

## 2017-04-03 PROCEDURE — A9270 NON-COVERED ITEM OR SERVICE: HCPCS | Mod: GY | Performed by: INTERNAL MEDICINE

## 2017-04-03 PROCEDURE — 25000132 ZZH RX MED GY IP 250 OP 250 PS 637: Mod: GY | Performed by: HOSPITALIST

## 2017-04-03 PROCEDURE — 80202 ASSAY OF VANCOMYCIN: CPT | Performed by: HOSPITALIST

## 2017-04-03 PROCEDURE — 25000131 ZZH RX MED GY IP 250 OP 636 PS 637: Mod: GY | Performed by: HOSPITALIST

## 2017-04-03 PROCEDURE — A9270 NON-COVERED ITEM OR SERVICE: HCPCS | Mod: GY | Performed by: HOSPITALIST

## 2017-04-03 PROCEDURE — 25000132 ZZH RX MED GY IP 250 OP 250 PS 637: Mod: GY | Performed by: INTERNAL MEDICINE

## 2017-04-03 PROCEDURE — 25000128 H RX IP 250 OP 636: Performed by: INTERNAL MEDICINE

## 2017-04-03 PROCEDURE — 12000007 ZZH R&B INTERMEDIATE

## 2017-04-03 PROCEDURE — 36415 COLL VENOUS BLD VENIPUNCTURE: CPT | Performed by: INTERNAL MEDICINE

## 2017-04-03 PROCEDURE — 85004 AUTOMATED DIFF WBC COUNT: CPT | Performed by: HOSPITALIST

## 2017-04-03 PROCEDURE — 25000131 ZZH RX MED GY IP 250 OP 636 PS 637: Mod: GY

## 2017-04-03 PROCEDURE — 36415 COLL VENOUS BLD VENIPUNCTURE: CPT | Performed by: HOSPITALIST

## 2017-04-03 PROCEDURE — 85027 COMPLETE CBC AUTOMATED: CPT | Performed by: HOSPITALIST

## 2017-04-03 PROCEDURE — 83605 ASSAY OF LACTIC ACID: CPT | Performed by: INTERNAL MEDICINE

## 2017-04-03 PROCEDURE — 80048 BASIC METABOLIC PNL TOTAL CA: CPT | Performed by: HOSPITALIST

## 2017-04-03 PROCEDURE — 40000847 ZZHCL STATISTIC MORPHOLOGY W/INTERP HISTOLOGY TC 85060: Performed by: INTERNAL MEDICINE

## 2017-04-03 PROCEDURE — 85045 AUTOMATED RETICULOCYTE COUNT: CPT | Performed by: HOSPITALIST

## 2017-04-03 PROCEDURE — 00000146 ZZHCL STATISTIC GLUCOSE BY METER IP

## 2017-04-03 PROCEDURE — 85060 BLOOD SMEAR INTERPRETATION: CPT | Performed by: INTERNAL MEDICINE

## 2017-04-03 PROCEDURE — 99233 SBSQ HOSP IP/OBS HIGH 50: CPT | Performed by: HOSPITALIST

## 2017-04-03 RX ORDER — BENZONATATE 100 MG/1
100 CAPSULE ORAL 3 TIMES DAILY PRN
Status: DISCONTINUED | OUTPATIENT
Start: 2017-04-03 | End: 2017-04-11 | Stop reason: HOSPADM

## 2017-04-03 RX ADMIN — OMEPRAZOLE 40 MG: 20 CAPSULE, DELAYED RELEASE ORAL at 07:49

## 2017-04-03 RX ADMIN — Medication 1 LOZENGE: at 08:33

## 2017-04-03 RX ADMIN — DOLUTEGRAVIR SODIUM 50 MG: 50 TABLET, FILM COATED ORAL at 22:18

## 2017-04-03 RX ADMIN — CARVEDILOL 12.5 MG: 12.5 TABLET, FILM COATED ORAL at 17:33

## 2017-04-03 RX ADMIN — ASPIRIN 81 MG CHEWABLE TABLET 81 MG: 81 TABLET CHEWABLE at 19:50

## 2017-04-03 RX ADMIN — CLONAZEPAM 0.5 MG: 0.5 TABLET ORAL at 00:12

## 2017-04-03 RX ADMIN — CALCIUM ACETATE 2001 MG: 667 CAPSULE ORAL at 11:59

## 2017-04-03 RX ADMIN — HYDRALAZINE HYDROCHLORIDE 25 MG: 25 TABLET ORAL at 07:49

## 2017-04-03 RX ADMIN — RITONAVIR 100 MG: 100 TABLET, FILM COATED ORAL at 22:17

## 2017-04-03 RX ADMIN — MYCOPHENOLATE MOFETIL 500 MG: 250 CAPSULE ORAL at 20:01

## 2017-04-03 RX ADMIN — TAZOBACTAM SODIUM AND PIPERACILLIN SODIUM 2.25 G: 250; 2 INJECTION, SOLUTION INTRAVENOUS at 11:59

## 2017-04-03 RX ADMIN — AMLODIPINE BESYLATE 5 MG: 5 TABLET ORAL at 22:18

## 2017-04-03 RX ADMIN — MAGNESIUM OXIDE TAB 400 MG (241.3 MG ELEMENTAL MG) 400 MG: 400 (241.3 MG) TAB at 22:19

## 2017-04-03 RX ADMIN — DARUNAVIR 800 MG: 800 TABLET, FILM COATED ORAL at 22:18

## 2017-04-03 RX ADMIN — Medication 2 LOZENGE: at 02:11

## 2017-04-03 RX ADMIN — DAPSONE 100 MG: 25 TABLET ORAL at 22:20

## 2017-04-03 RX ADMIN — CARVEDILOL 12.5 MG: 12.5 TABLET, FILM COATED ORAL at 07:49

## 2017-04-03 RX ADMIN — INSULIN GLARGINE 35 UNITS: 100 INJECTION, SOLUTION SUBCUTANEOUS at 07:48

## 2017-04-03 RX ADMIN — MYCOPHENOLATE MOFETIL 500 MG: 250 CAPSULE ORAL at 07:49

## 2017-04-03 RX ADMIN — GUAIFENESIN 10 ML: 100 SOLUTION ORAL at 08:33

## 2017-04-03 RX ADMIN — HYDRALAZINE HYDROCHLORIDE 25 MG: 25 TABLET ORAL at 22:17

## 2017-04-03 RX ADMIN — ACETAMINOPHEN 650 MG: 325 TABLET, FILM COATED ORAL at 22:22

## 2017-04-03 RX ADMIN — Medication 2 LOZENGE: at 00:11

## 2017-04-03 RX ADMIN — CALCIUM ACETATE 2001 MG: 667 CAPSULE ORAL at 17:33

## 2017-04-03 RX ADMIN — BENZONATATE 100 MG: 100 CAPSULE ORAL at 16:38

## 2017-04-03 RX ADMIN — BENZONATATE 100 MG: 100 CAPSULE ORAL at 22:18

## 2017-04-03 RX ADMIN — TAZOBACTAM SODIUM AND PIPERACILLIN SODIUM 2.25 G: 250; 2 INJECTION, SOLUTION INTRAVENOUS at 06:07

## 2017-04-03 RX ADMIN — TAZOBACTAM SODIUM AND PIPERACILLIN SODIUM 2.25 G: 250; 2 INJECTION, SOLUTION INTRAVENOUS at 17:39

## 2017-04-03 RX ADMIN — LOSARTAN POTASSIUM 100 MG: 100 TABLET, FILM COATED ORAL at 22:18

## 2017-04-03 RX ADMIN — CALCIUM ACETATE 2001 MG: 667 CAPSULE ORAL at 07:49

## 2017-04-03 RX ADMIN — GABAPENTIN 300 MG: 300 CAPSULE ORAL at 07:48

## 2017-04-03 RX ADMIN — ATORVASTATIN CALCIUM 40 MG: 40 TABLET, FILM COATED ORAL at 22:17

## 2017-04-03 RX ADMIN — CLOPIDOGREL 75 MG: 75 TABLET, FILM COATED ORAL at 19:50

## 2017-04-03 RX ADMIN — TAZOBACTAM SODIUM AND PIPERACILLIN SODIUM 2.25 G: 250; 2 INJECTION, SOLUTION INTRAVENOUS at 00:12

## 2017-04-03 RX ADMIN — ISOSORBIDE MONONITRATE 60 MG: 30 TABLET, EXTENDED RELEASE ORAL at 19:50

## 2017-04-03 RX ADMIN — ABACAVIR 600 MG: 300 TABLET, FILM COATED ORAL at 19:49

## 2017-04-03 RX ADMIN — Medication 2 LOZENGE: at 11:36

## 2017-04-03 RX ADMIN — HYDRALAZINE HYDROCHLORIDE 25 MG: 25 TABLET ORAL at 16:38

## 2017-04-03 RX ADMIN — Medication 1 CAPSULE: at 19:49

## 2017-04-03 RX ADMIN — GABAPENTIN 600 MG: 300 CAPSULE ORAL at 19:49

## 2017-04-03 NOTE — PLAN OF CARE
Problem: Goal Outcome Summary  Goal: Goal Outcome Summary  Outcome: No Change  VSS. Tele: SR. Up with SBA d/t dizziness. HD tomorrow. To receive blood transfusion with HD tomorrow. , 162. Tamiflu after HD runs. Continues on IV abx.

## 2017-04-03 NOTE — PROGRESS NOTES
Renal Medicine       Plan am dialysis  Dialysis orders entered          Recent Labs  Lab 04/03/17  0625      POTASSIUM 4.4   CHLORIDE 106   CO2 25   ANIONGAP 11   *   BUN 48*   CR 6.42*   GFRESTIMATED 9*   GFRESTBLACK 10*   PRABHA 8.6           JAMAL Fuller    [facility]  [phone]

## 2017-04-03 NOTE — PHARMACY-VANCOMYCIN DOSING SERVICE
Pharmacy Vancomycin Note  Date of Service April 3, 2017  Patient's  1950   67 year old, male    Indication: Healthcare-Associated Pneumonia  Goal Trough Level: 15-20 mg/L  Day of Therapy: 7  Current Vancomycin regimen:intermittent dosing in chronic HD pt    Current estimated CrCl = Estimated Creatinine Clearance: 13 mL/min (based on Cr of 6.42).    Creatinine for last 3 days  2017:  7:15 AM Creatinine 7.18 mg/dL  2017:  6:30 AM Creatinine 5.03 mg/dL  4/3/2017:  6:25 AM Creatinine 6.42 mg/dL    Recent Vancomycin Levels (past 3 days)  2017:  7:15 AM Vancomycin Level 20.2 mg/L  4/3/2017:  6:25 AM Vancomycin Level 20.8 mg/L    Vancomycin IV Administrations (past 72 hours)                   vancomycin (VANCOCIN) 750 mg in NaCl 0.9 % 250 mL intermittent infusion (mg) 750 mg New Bag 17 1834                Nephrotoxins and other renal medications (Future)    Start     Dose/Rate Route Frequency Ordered Stop    17 0400  piperacillin-tazobactam (ZOSYN) infusion 2.25 g      2.25 g  100 mL/hr over 30 Minutes Intravenous EVERY 6 HOURS 17 2325      17 0025  vancomycin place koenig - receiving intermittent dosing      1 each Does not apply SEE ADMIN INSTRUCTIONS 17 0025               Contrast Orders - past 72 hours     None          Interpretation of levels and current regimen:  Trough level is  Therapeutic    Has serum creatinine changed > 50% in last 72 hours: chronic HD    Urine output:  Chronic HD pt    Renal Function: ESRD on Dialysis    Plan:  1.  Pt not dialyzing today.  Will redose pt after HD tomorrow.  Level today 20.8, level will still be above 15 tomorrow, so no need to redose today.  2.  Pharmacy will check trough levels as appropriate in prior to HD run on Thursday.    3. Serum creatinine levels will be ordered per MD in chronic HD pt.      Amie Anthony        .

## 2017-04-03 NOTE — PLAN OF CARE
Problem: Goal Outcome Summary  Goal: Goal Outcome Summary  Outcome: Improving  Patient Progress:  Improving  Outcome Summary:  Temp: 97.3  F (36.3  C) Temp src: Oral BP: 157/73   Heart Rate: 78 Resp: 18 SpO2: 96 % O2 Device: None (Room air)       Elevated BP, all other VSS, A&O x4, up with SBA. Fistula on right arm. Thrill and bruit present. Remains on droplet precautions for influenza. Plan for DC today or tomorrow. Tele is SR.

## 2017-04-03 NOTE — PLAN OF CARE
Problem: Goal Outcome Summary  Goal: Goal Outcome Summary  Outcome: No Change  VSS  Neuro: AOX4  Respiratory: LS Diminished on RA  Cardiac: SR   GI: no n/v/d no BM noted  : SAGE AVF +ve bruit/thrill, dsg CDI.  Skin: Intact  Labs: K+3.9, Hgb 7.4  B, 242  Plan: iv abx, SSI, isolation, possible d/c Monday or Tuesday  Pt calls appropriately, up with SBA, POC reviewed with pt and voices understanding.

## 2017-04-03 NOTE — PROVIDER NOTIFICATION
"On-call MD paged, \"FYI at 5:57pm, Pt's tele showed abnormal V paced run for 9 beats, HR 71, with slight BBB. Converted back to SR after, Pt asymptomatic.\"  "

## 2017-04-03 NOTE — PROGRESS NOTES
[facility]  Infectious Disease Progress Note          Assessment and Plan:   IMPRESSION:   1. A 67-year-old male, complicated medical history, admitted with acute respiratory symptoms found to be influenza A positive, almost certainly that is the primary diagnosis, some infiltrate, certainly at risk for secondary pneumonia, being treated as such.   2. History of human immunodeficiency virus infection, without major opportunistic infections controlled disease, most recent T cells somewhat low at 184, but not likely to have an opportunistic infection.   3. Prior hospitalization in January with chest pain and a history of coronary disease.   4. Nosocomial pneumonia as a complication of the above hospitalization including intubation in January, respiratory status stable since.   5. Diabetes mellitus.   6. End-stage renal disease on chronic dialysis.   7. Sulfa allergy.   8. History of nephrolithiasis.   9 Anemia      RECOMMENDATIONS:   1. Complete influenza tx with Tamiflu at renal adjusted dose.   2.  Zosyn as though possible secondary pneumonia. Dc vanco  3. Follow clinically, probably not prolonged antibiotics needed here depending on clinical response; hold on a bigger workup for now. T down O2 OK  4. Continue HIV meds as previously.          Interval History:   no new complaints and doing Ok resolved chest pain, O2 Ok on RA, no + cxs LGF              Medications:       [START ON 4/4/2017] oseltamivir  30 mg Oral Once per day on Tue Thu Sat     insulin glargine  35 Units Subcutaneous QAM     - MEDICATION INSTRUCTIONS for Dialysis Patients -   Does not apply See Admin Instructions     omeprazole  40 mg Oral Daily     magnesium oxide (MAG-OX) tablet 400 mg  400 mg Oral At Bedtime     losartan  100 mg Oral At Bedtime     hydrALAZINE  25 mg Oral TID     gabapentin  600 mg Oral QPM     B complex-C-folic acid  1 capsule Oral QPM     amLODIPine  5 mg Oral At Bedtime     insulin aspart  1-10 Units  "Subcutaneous TID AC     insulin aspart  1-7 Units Subcutaneous At Bedtime     mycophenolate  500 mg Oral BID     aspirin chewable tablet 81 mg  81 mg Oral QPM     atorvastatin  40 mg Oral At Bedtime     calcium acetate  2,001 mg Oral TID w/meals     carvedilol  12.5 mg Oral BID w/meals     clopidogrel (PLAVIX) tablet 75 mg  75 mg Oral QPM     dapsone  100 mg Oral At Bedtime     dolutegravir  50 mg Oral At Bedtime     darunavir  800 mg Oral At Bedtime     gabapentin  300 mg Oral QAM     insulin aspart  5 Units Subcutaneous TID AC     isosorbide mononitrate  60 mg Oral QPM     ritonavir  100 mg Oral At Bedtime     piperacillin-tazobactam  2.25 g Intravenous Q6H     abacavir  600 mg Oral QPM                  Physical Exam:   Blood pressure 162/74, pulse 72, temperature 97.4  F (36.3  C), temperature source Oral, resp. rate 24, height 1.803 m (5' 11\"), weight 84.8 kg (187 lb), SpO2 94 %.  Wt Readings from Last 2 Encounters:   04/03/17 84.8 kg (187 lb)   01/29/17 79.9 kg (176 lb 3.2 oz)     Vital Signs with Ranges  Temp:  [97.2  F (36.2  C)-98  F (36.7  C)] 97.4  F (36.3  C)  Heart Rate:  [72-82] 72  Resp:  [18-24] 24  BP: (151-168)/(71-76) 162/74  SpO2:  [93 %-96 %] 94 %    Constitutional: Awake, alert, cooperative, no apparent distress   Lungs: Congestion to auscultation bilaterally, few crackles some wheezing   Cardiovascular: Regular rate and rhythm, normal S1 and S2, and no murmur noted   Abdomen: Normal bowel sounds, soft, non-distended, non-tender   Skin: No rashes, no cyanosis, no edema   Other:           Data:   All microbiology laboratory data reviewed.  Recent Labs   Lab Test  04/03/17   0625  04/02/17   0630  04/01/17   0715   WBC  3.1*  3.0*  2.9*   HGB  7.0*  7.4*  7.5*   HCT  22.2*  23.5*  23.3*   MCV  94  93  93   PLT  100*  100*  81*     Recent Labs   Lab Test  04/03/17   0625  04/02/17   0630  04/01/17   0715   CR  6.42*  5.03*  7.18*     No lab results found.  Recent Labs   Lab Test  03/30/17   0800  " 03/29/17   0131  03/28/17   2044  03/28/17 2015 03/28/17   1953  01/07/17   1150  01/05/17 2012 01/05/17 2000 05/19/16   1730   CULT  Light growth Normal subhash  Canceled, Test credited >10 Squamous epithelial cells/low power field indicates   oral contamination. Please recollect. Notification of test cancellation was   given to Jennifer Pacheco RN [id number] 0416 03.29.17 CF  *  10,000 to 50,000 colonies/mL mixed urogenital subhash  Susceptibility testing not routinely done    No growth  No growth  No growth  Incorrectly ordered by PCU/Clinic Canceled, Test credited ordered sputum culture   instead.    No growth  No growth  No growth

## 2017-04-03 NOTE — PROGRESS NOTES
[facility]  Hospitalist Progress Note  Christian Fletcher DO MPH 04/03/2017    Reason for Stay (Diagnosis): Flu, PNA         Assessment and Plan:      Summary of Stay: Donald Raza is a 67 year old male with a history of DM2, HIV, TIA, ESRD on HD, CAD, HTN admitted on 3/28/2017 with fever, cough, and weakness ultimately dx with influenza and PNA and started on tamiflu and vanco/zosyn, respectively. Has improved from a respiratory standpoint, ID managing antimicrobials. Had what seems to be brief non-cardiac CP, now resolved. Heme also consulted for recurrent anemia and TCP for which he was already given 2 U PRBC but will receive 2 more U tomorrow during HD. Likely nearing discharge pending consultant recommendations.    Problem List:   1. Influenza: Continue tamiflu per ID recommendations, has had 3 doses (recieves after HD), likely last dose tomorrow after HD.  2. PNA: Procal elevated to about 5, infiltrate on CXR but recent HCAP. Continue vanco/zosyn for now. Clinically improving. ID managing abx.  3. ESRD on HD qTRS s/p failed transplant: No issues. Renal consulted. Continue Cellcept.  4. Acute on chronic anemia and TCP: No obvious bleeding. Unclear etiology, likely multifactorial. Recieved 2 U PRBC 3/30 and needs irradiated products. Has undergone GI w/u with recent neg EGD. Hb stable in 7s but continues to drift down, now back to 7.0. Monitor daily. Given recurrent problem with anemia and TCP, have requested hematology evaluation which is appreciated. Smear sent and pending. Will re-order 2 U irradiated PRBC for tomorrow during HD.  5. CAD s/p PCI with stenting (within the past year): Stable, no CP. On DAP. Continue ASA 81, plavix, atorvastatin 40, coreg 12.5 bid, imdur 60 daily. Recent cath from Jan reviewed.  6. HIV: Appreciate ID input. Not likely active issue. Continue PTA norvir, tivicay, prezista, dapsone.  7. Hx of TIA: Stable, continue atorvastatin and ASA/plavix.  8. HTN: BP reasonable. HD  today. Continue coreg, norvasc 5, hydralazine 25 tid, imdur, losartan 100.  9. DM: BG better today but was previously well controlled on lantus 30 U am, high SSI, 5 U WM. Continued increased lantus at 35.  10. Non-exertional chest pain: Resolved. Does not sound cardiac. Undetectable trops, non concerning EKG. Recent cath reviewed, no significant obstructive vessels. Doubt PE, dissection. More likely MSK vs pleurisy from cough and infection.    DVT Prophylaxis: Pneumatic Compression Devices  Code Status: Full Code  Disposition: Expected discharge unclear, pending ID and heme recs.        Interval History (Subjective):      Pt doing well. No chest pain or shortness of breath. No nausea, vomiting, diarrhea, constipation. No fevers. No other complaints identified.                   Physical Exam:      Vital Signs:  Temp: 97.2  F (36.2  C) Temp src: Oral BP: 151/74   Heart Rate: 74 Resp: 18 SpO2: 93 % O2 Device: None (Room air)    Vitals:    03/31/17 0311 04/01/17 0724 04/03/17 0606   Weight: 83.8 kg (184 lb 11.9 oz) 86.5 kg (190 lb 11.2 oz) 84.8 kg (187 lb)     Vital Signs with Ranges  Temp:  [96.9  F (36.1  C)-98  F (36.7  C)] 97.2  F (36.2  C)  Heart Rate:  [74-82] 74  Resp:  [18] 18  BP: (150-168)/(71-76) 151/74  SpO2:  [93 %-96 %] 93 %  I/O last 3 completed shifts:  In: 880 [P.O.:880]  Out: 100 [Urine:100]    GENERAL: No apparent distress. Awake, alert, and fully oriented.  HEENT: Normocephalic, atraumatic. Extraocular movements intact.  CARDIOVASCULAR: Regular rate and rhythm without murmurs or rubs. No S3.  PULMONARY: Clear bilaterally.  ABDOMINAL: Soft, non-tender, non-distended. Bowel sounds normoactive. No hepatosplenomegaly.  EXTREMITIES: No cyanosis or clubbing. No edema.  NEUROLOGICAL: CN 2-12 grossly intact, no focal neurological deficits.  DERMATOLOGICAL: No rash, ulcer, ecchymoses, jaundice.         Medications:      All current medications were reviewed with changes reflected in problem list.          Data:      All new lab, EKGs, telemetry strips, and imaging data was personally reviewed.   Labs:    Recent Labs  Lab 04/03/17 0625 04/02/17 0630 04/01/17  0715   WBC 3.1* 3.0* 2.9*   HGB 7.0* 7.4* 7.5*   HCT 22.2* 23.5* 23.3*   MCV 94 93 93   * 100* 81*       Recent Labs  Lab 04/03/17 0625 04/02/17  0630 04/01/17  0715    141 138   POTASSIUM 4.4 3.9 4.3   CHLORIDE 106 104 100   CO2 25 27 27   ANIONGAP 11 10 11   * 189* 214*   BUN 48* 32* 49*   CR 6.42* 5.03* 7.18*   GFRESTIMATED 9* 11* 8*   GFRESTBLACK 10* 14* 9*   PRABHA 8.6 8.5 8.7     Imaging (past 24 hours):   No results found for this or any previous visit (from the past 24 hour(s)).    Christian Fletcher DO MPH  [facility]  [address] [place] [address]  Pager: [phone]  04/03/2017

## 2017-04-04 LAB
ABO + RH BLD: NORMAL
ABO + RH BLD: NORMAL
ANION GAP SERPL CALCULATED.3IONS-SCNC: 12 MMOL/L (ref 3–14)
BLD GP AB SCN SERPL QL: NORMAL
BLD PROD TYP BPU: NORMAL
BLD UNIT ID BPU: 0
BLD UNIT ID BPU: 0
BLOOD BANK CMNT PATIENT-IMP: NORMAL
BLOOD PRODUCT CODE: NORMAL
BLOOD PRODUCT CODE: NORMAL
BPU ID: NORMAL
BPU ID: NORMAL
BUN SERPL-MCNC: 71 MG/DL (ref 7–30)
CALCIUM SERPL-MCNC: 8.9 MG/DL (ref 8.5–10.1)
CHLORIDE SERPL-SCNC: 106 MMOL/L (ref 94–109)
CO2 SERPL-SCNC: 23 MMOL/L (ref 20–32)
CREAT SERPL-MCNC: 7.55 MG/DL (ref 0.66–1.25)
ERYTHROCYTE [DISTWIDTH] IN BLOOD BY AUTOMATED COUNT: 14.7 % (ref 10–15)
GFR SERPL CREATININE-BSD FRML MDRD: 7 ML/MIN/1.7M2
GLUCOSE BLDC GLUCOMTR-MCNC: 101 MG/DL (ref 70–99)
GLUCOSE BLDC GLUCOMTR-MCNC: 106 MG/DL (ref 70–99)
GLUCOSE BLDC GLUCOMTR-MCNC: 146 MG/DL (ref 70–99)
GLUCOSE BLDC GLUCOMTR-MCNC: 253 MG/DL (ref 70–99)
GLUCOSE BLDC GLUCOMTR-MCNC: 316 MG/DL (ref 70–99)
GLUCOSE SERPL-MCNC: 119 MG/DL (ref 70–99)
HCT VFR BLD AUTO: 20.8 % (ref 40–53)
HGB BLD-MCNC: 6.7 G/DL (ref 13.3–17.7)
HGB BLD-MCNC: 8 G/DL (ref 13.3–17.7)
MCH RBC QN AUTO: 29.4 PG (ref 26.5–33)
MCHC RBC AUTO-ENTMCNC: 32.2 G/DL (ref 31.5–36.5)
MCV RBC AUTO: 91 FL (ref 78–100)
NUM BPU REQUESTED: 2
PLATELET # BLD AUTO: 118 10E9/L (ref 150–450)
POTASSIUM SERPL-SCNC: 4.4 MMOL/L (ref 3.4–5.3)
RBC # BLD AUTO: 2.28 10E12/L (ref 4.4–5.9)
SODIUM SERPL-SCNC: 141 MMOL/L (ref 133–144)
SPECIMEN EXP DATE BLD: NORMAL
TRANSFUSION STATUS PATIENT QL: NORMAL
WBC # BLD AUTO: 4.9 10E9/L (ref 4–11)

## 2017-04-04 PROCEDURE — 63400005 ZZH RX 634: Performed by: INTERNAL MEDICINE

## 2017-04-04 PROCEDURE — 36415 COLL VENOUS BLD VENIPUNCTURE: CPT | Performed by: HOSPITALIST

## 2017-04-04 PROCEDURE — 00000146 ZZHCL STATISTIC GLUCOSE BY METER IP

## 2017-04-04 PROCEDURE — 86850 RBC ANTIBODY SCREEN: CPT | Performed by: INTERNAL MEDICINE

## 2017-04-04 PROCEDURE — 85027 COMPLETE CBC AUTOMATED: CPT | Performed by: HOSPITALIST

## 2017-04-04 PROCEDURE — 25000131 ZZH RX MED GY IP 250 OP 636 PS 637: Mod: GY

## 2017-04-04 PROCEDURE — A9270 NON-COVERED ITEM OR SERVICE: HCPCS | Mod: GY | Performed by: INTERNAL MEDICINE

## 2017-04-04 PROCEDURE — 25000128 H RX IP 250 OP 636: Performed by: INTERNAL MEDICINE

## 2017-04-04 PROCEDURE — 25000132 ZZH RX MED GY IP 250 OP 250 PS 637: Mod: GY | Performed by: INTERNAL MEDICINE

## 2017-04-04 PROCEDURE — 86923 COMPATIBILITY TEST ELECTRIC: CPT | Performed by: INTERNAL MEDICINE

## 2017-04-04 PROCEDURE — 25000131 ZZH RX MED GY IP 250 OP 636 PS 637: Mod: GY | Performed by: HOSPITALIST

## 2017-04-04 PROCEDURE — 86900 BLOOD TYPING SEROLOGIC ABO: CPT | Performed by: INTERNAL MEDICINE

## 2017-04-04 PROCEDURE — A9270 NON-COVERED ITEM OR SERVICE: HCPCS | Mod: GY | Performed by: HOSPITALIST

## 2017-04-04 PROCEDURE — 86901 BLOOD TYPING SEROLOGIC RH(D): CPT | Performed by: INTERNAL MEDICINE

## 2017-04-04 PROCEDURE — 25000132 ZZH RX MED GY IP 250 OP 250 PS 637: Mod: GY | Performed by: HOSPITALIST

## 2017-04-04 PROCEDURE — P9040 RBC LEUKOREDUCED IRRADIATED: HCPCS | Performed by: INTERNAL MEDICINE

## 2017-04-04 PROCEDURE — 12000007 ZZH R&B INTERMEDIATE

## 2017-04-04 PROCEDURE — 90937 HEMODIALYSIS REPEATED EVAL: CPT

## 2017-04-04 PROCEDURE — 85018 HEMOGLOBIN: CPT | Performed by: INTERNAL MEDICINE

## 2017-04-04 PROCEDURE — 99232 SBSQ HOSP IP/OBS MODERATE 35: CPT | Performed by: INTERNAL MEDICINE

## 2017-04-04 PROCEDURE — 80048 BASIC METABOLIC PNL TOTAL CA: CPT | Performed by: HOSPITALIST

## 2017-04-04 RX ORDER — HEPARIN SODIUM 1000 [USP'U]/ML
500 INJECTION, SOLUTION INTRAVENOUS; SUBCUTANEOUS
Status: DISCONTINUED | OUTPATIENT
Start: 2017-04-04 | End: 2017-04-04

## 2017-04-04 RX ORDER — HEPARIN SODIUM 1000 [USP'U]/ML
500 INJECTION, SOLUTION INTRAVENOUS; SUBCUTANEOUS CONTINUOUS
Status: DISCONTINUED | OUTPATIENT
Start: 2017-04-04 | End: 2017-04-04

## 2017-04-04 RX ADMIN — RITONAVIR 100 MG: 100 TABLET, FILM COATED ORAL at 21:17

## 2017-04-04 RX ADMIN — SODIUM CHLORIDE 250 ML: 9 INJECTION, SOLUTION INTRAVENOUS at 09:18

## 2017-04-04 RX ADMIN — TAZOBACTAM SODIUM AND PIPERACILLIN SODIUM 2.25 G: 250; 2 INJECTION, SOLUTION INTRAVENOUS at 12:59

## 2017-04-04 RX ADMIN — GABAPENTIN 600 MG: 300 CAPSULE ORAL at 21:19

## 2017-04-04 RX ADMIN — Medication 2 LOZENGE: at 09:26

## 2017-04-04 RX ADMIN — Medication 1 LOZENGE: at 04:55

## 2017-04-04 RX ADMIN — DAPSONE 100 MG: 25 TABLET ORAL at 21:19

## 2017-04-04 RX ADMIN — DOLUTEGRAVIR SODIUM 50 MG: 50 TABLET, FILM COATED ORAL at 21:19

## 2017-04-04 RX ADMIN — CARVEDILOL 12.5 MG: 12.5 TABLET, FILM COATED ORAL at 13:00

## 2017-04-04 RX ADMIN — MYCOPHENOLATE MOFETIL 500 MG: 250 CAPSULE ORAL at 21:18

## 2017-04-04 RX ADMIN — CARVEDILOL 12.5 MG: 12.5 TABLET, FILM COATED ORAL at 17:14

## 2017-04-04 RX ADMIN — Medication 2 LOZENGE: at 00:24

## 2017-04-04 RX ADMIN — ABACAVIR 600 MG: 300 TABLET, FILM COATED ORAL at 21:17

## 2017-04-04 RX ADMIN — MYCOPHENOLATE MOFETIL 500 MG: 250 CAPSULE ORAL at 13:00

## 2017-04-04 RX ADMIN — DARUNAVIR 800 MG: 800 TABLET, FILM COATED ORAL at 21:17

## 2017-04-04 RX ADMIN — HYDRALAZINE HYDROCHLORIDE 25 MG: 25 TABLET ORAL at 17:14

## 2017-04-04 RX ADMIN — CLOPIDOGREL 75 MG: 75 TABLET, FILM COATED ORAL at 21:17

## 2017-04-04 RX ADMIN — TAZOBACTAM SODIUM AND PIPERACILLIN SODIUM 2.25 G: 250; 2 INJECTION, SOLUTION INTRAVENOUS at 18:16

## 2017-04-04 RX ADMIN — CALCIUM ACETATE 2001 MG: 667 CAPSULE ORAL at 17:14

## 2017-04-04 RX ADMIN — TAZOBACTAM SODIUM AND PIPERACILLIN SODIUM 2.25 G: 250; 2 INJECTION, SOLUTION INTRAVENOUS at 00:14

## 2017-04-04 RX ADMIN — ISOSORBIDE MONONITRATE 60 MG: 30 TABLET, EXTENDED RELEASE ORAL at 21:17

## 2017-04-04 RX ADMIN — CLONAZEPAM 0.5 MG: 0.5 TABLET ORAL at 22:51

## 2017-04-04 RX ADMIN — GABAPENTIN 300 MG: 300 CAPSULE ORAL at 13:01

## 2017-04-04 RX ADMIN — ASPIRIN 81 MG CHEWABLE TABLET 81 MG: 81 TABLET CHEWABLE at 21:18

## 2017-04-04 RX ADMIN — Medication 2 LOZENGE: at 18:09

## 2017-04-04 RX ADMIN — ATORVASTATIN CALCIUM 40 MG: 40 TABLET, FILM COATED ORAL at 22:51

## 2017-04-04 RX ADMIN — LOSARTAN POTASSIUM 100 MG: 100 TABLET, FILM COATED ORAL at 21:18

## 2017-04-04 RX ADMIN — ERYTHROPOIETIN 10000 UNITS: 10000 INJECTION, SOLUTION INTRAVENOUS; SUBCUTANEOUS at 10:39

## 2017-04-04 RX ADMIN — BENZONATATE 100 MG: 100 CAPSULE ORAL at 21:17

## 2017-04-04 RX ADMIN — HYDRALAZINE HYDROCHLORIDE 25 MG: 25 TABLET ORAL at 13:00

## 2017-04-04 RX ADMIN — Medication 1 LOZENGE: at 21:17

## 2017-04-04 RX ADMIN — AMLODIPINE BESYLATE 5 MG: 5 TABLET ORAL at 21:18

## 2017-04-04 RX ADMIN — OMEPRAZOLE 40 MG: 20 CAPSULE, DELAYED RELEASE ORAL at 13:00

## 2017-04-04 RX ADMIN — MAGNESIUM OXIDE TAB 400 MG (241.3 MG ELEMENTAL MG) 400 MG: 400 (241.3 MG) TAB at 21:18

## 2017-04-04 RX ADMIN — TAZOBACTAM SODIUM AND PIPERACILLIN SODIUM 2.25 G: 250; 2 INJECTION, SOLUTION INTRAVENOUS at 05:58

## 2017-04-04 RX ADMIN — CALCIUM ACETATE 2001 MG: 667 CAPSULE ORAL at 09:26

## 2017-04-04 RX ADMIN — GUAIFENESIN 10 ML: 100 SOLUTION ORAL at 09:26

## 2017-04-04 RX ADMIN — GUAIFENESIN 10 ML: 100 SOLUTION ORAL at 21:17

## 2017-04-04 RX ADMIN — HYDRALAZINE HYDROCHLORIDE 25 MG: 25 TABLET ORAL at 22:51

## 2017-04-04 RX ADMIN — INSULIN GLARGINE 35 UNITS: 100 INJECTION, SOLUTION SUBCUTANEOUS at 09:26

## 2017-04-04 RX ADMIN — CALCIUM ACETATE 2001 MG: 667 CAPSULE ORAL at 13:00

## 2017-04-04 RX ADMIN — Medication 1 CAPSULE: at 21:17

## 2017-04-04 RX ADMIN — Medication 1 LOZENGE: at 04:58

## 2017-04-04 NOTE — PLAN OF CARE
Problem: Goal Outcome Summary  Goal: Goal Outcome Summary  Outcome: No Change  Pt. A&O very pleasant. VSS, afebrile. Denies pain. Productive, forceful cough. Transfers with SBA d/t generalized weakness. . Tele- SR. Continues Tamiflu for influenza. Abx- Zosyn for PNA. PRN lozenges administered. Critical lab value of 6.7. Pt. Plan to receive 2 units today during dialysis run. Nephrology/hemat/ID following.

## 2017-04-04 NOTE — PROGRESS NOTES
[facility]  Hospitalist Progress Note  Marquez Tolliver,  04/04/2017    Reason for Stay (Diagnosis): Pneumonia         Assessment and Plan:      SSummary of Stay: Donald Raza is a 67 year old male with a history of DM2, HIV, TIA, ESRD on HD, CAD, HTN admitted on 3/28/2017 with fever, cough, and weakness ultimately dx with influenza and PNA and started on tamiflu and vanco/zosyn, respectively. Has improved from a respiratory standpoint, ID managing antimicrobials. Had what seems to be brief non-cardiac CP, now resolved. Heme also consulted for recurrent anemia and TCP for which he has 4 units during hospital stay. Likely nearing discharge.     Problem List:   1. Influenza: Completed Tamiflu at renal adjusted doses per ID.  2. PNA: Procal elevated to about 5, infiltrate on CXR but recent HCAP. Continue zosyn through today. Clinically improving.  Vanco stopped 4/3.  3. ESRD on HD qTRS s/p failed transplant: No issues. Renal consulted. Continue Cellcept.  Dialysis per Nephrology.  4. Acute on chronic anemia and TCP: No obvious bleeding. Unclear etiology, likely multifactorial. Recieved 2 U PRBC 3/30 and needs irradiated products. Has undergone GI w/u with recent neg EGD. Had 2 more units PRBC's today with dialysis.  Recheck CBC tomorrow.  5. CAD s/p PCI with stenting (within the past year): Stable, no CP. On DAP. Continue ASA 81, plavix, atorvastatin 40, coreg 12.5 bid, imdur 60 daily. Recent cath from Jan reviewed.  6. HIV: Appreciate ID input. Not likely active issue. Continue PTA norvir, tivicay, prezista, dapsone.  7. Hx of TIA: Stable, continue atorvastatin and ASA/plavix.  8. HTN: BP reasonable. HD today. Continue coreg, norvasc 5, hydralazine 25 tid, imdur, losartan 100.  9. DM: Continue Lantus, scheduled Novolog, and Novolog SSI.       DVT Prophylaxis: Pneumatic Compression Devices  Code Status: Full Code  Discharge Dispo: home  Estimated Disch Date / # of Days until Disch: 1-2         "Interval History (Subjective):      Short of breath at times.  Stools loose.  No CP, F/C, N/V.                  Physical Exam:      Last Vital Signs:  /88  Pulse 72  Temp 97.2  F (36.2  C) (Oral)  Resp 16  Ht 1.803 m (5' 11\")  Wt 91.8 kg (202 lb 6.1 oz)  SpO2 94%  BMI 28.23 kg/m2    Gen:  NAD, A&Ox3.  Eyes:  PERRL, sclera anicteric.  OP:  MMM, no lesions.  Neck:  Supple.  CV:  Regular, no murmurs.  Lung:  CTA b/l, normal effort.  Ab:  +BS, soft.  Skin:  Warm, dry to touch.  No rash.  Ext:  No pitting edema LE b/l.           Medications:      All current medications were reviewed with changes reflected in problem list.         Data:      All new lab and imaging data was reviewed.   Labs:    Recent Labs  Lab 04/04/17  0554      POTASSIUM 4.4   CHLORIDE 106   CO2 23   ANIONGAP 12   *   BUN 71*   CR 7.55*   GFRESTIMATED 7*   GFRESTBLACK 9*   PRABHA 8.9       Recent Labs  Lab 04/04/17  1215 04/04/17  0554   WBC  --  4.9   HGB 8.0* 6.7*   HCT  --  20.8*   MCV  --  91   PLT  --  118*      Imaging:   No results found for this or any previous visit (from the past 24 hour(s)).    "

## 2017-04-04 NOTE — PROGRESS NOTES
[facility]  Infectious Disease Progress Note          Assessment and Plan:   IMPRESSION:   1. A 67-year-old male, complicated medical history, admitted with acute respiratory symptoms found to be influenza A positive, almost certainly that is the primary diagnosis, some infiltrate, certainly at risk for secondary pneumonia, being treated as such.   2. History of human immunodeficiency virus infection, without major opportunistic infections controlled disease, most recent T cells somewhat low at 184, but not likely to have an opportunistic infection.   3. Prior hospitalization in January with chest pain and a history of coronary disease.   4. Nosocomial pneumonia as a complication of the above hospitalization including intubation in January, respiratory status stable since.   5. Diabetes mellitus.   6. End-stage renal disease on chronic dialysis.   7. Sulfa allergy.   8. History of nephrolithiasis.   9 Anemia      RECOMMENDATIONS:   1. Completed influenza tx with Tamiflu at renal adjusted dose.   2.  Zosyn as though possible secondary pneumonia. Dc vanco  3. Follow clinically, probably not prolonged antibiotics needed here depending on clinical response; hold on a bigger workup for now. T down O2 OK, I would be Ok stop after today  4. Continue HIV meds as previously.          Interval History:   no new complaints and doing Ok resolved chest pain, O2 Ok on RA, no + cxs LGF resolved              Medications:       oseltamivir  30 mg Oral Once per day on Tue Thu Sat     insulin glargine  35 Units Subcutaneous QAM     - MEDICATION INSTRUCTIONS for Dialysis Patients -   Does not apply See Admin Instructions     omeprazole  40 mg Oral Daily     magnesium oxide (MAG-OX) tablet 400 mg  400 mg Oral At Bedtime     losartan  100 mg Oral At Bedtime     hydrALAZINE  25 mg Oral TID     gabapentin  600 mg Oral QPM     B complex-C-folic acid  1 capsule Oral QPM     amLODIPine  5 mg Oral At Bedtime     insulin  "aspart  1-10 Units Subcutaneous TID AC     insulin aspart  1-7 Units Subcutaneous At Bedtime     mycophenolate  500 mg Oral BID     aspirin chewable tablet 81 mg  81 mg Oral QPM     atorvastatin  40 mg Oral At Bedtime     calcium acetate  2,001 mg Oral TID w/meals     carvedilol  12.5 mg Oral BID w/meals     clopidogrel (PLAVIX) tablet 75 mg  75 mg Oral QPM     dapsone  100 mg Oral At Bedtime     dolutegravir  50 mg Oral At Bedtime     darunavir  800 mg Oral At Bedtime     gabapentin  300 mg Oral QAM     insulin aspart  5 Units Subcutaneous TID AC     isosorbide mononitrate  60 mg Oral QPM     ritonavir  100 mg Oral At Bedtime     piperacillin-tazobactam  2.25 g Intravenous Q6H     abacavir  600 mg Oral QPM                  Physical Exam:   Blood pressure 174/88, pulse 72, temperature 97.2  F (36.2  C), temperature source Oral, resp. rate 16, height 1.803 m (5' 11\"), weight 91.8 kg (202 lb 6.1 oz), SpO2 94 %.  Wt Readings from Last 2 Encounters:   04/04/17 91.8 kg (202 lb 6.1 oz)   01/29/17 79.9 kg (176 lb 3.2 oz)     Vital Signs with Ranges  Temp:  [96.8  F (36  C)-98  F (36.7  C)] 97.2  F (36.2  C)  Heart Rate:  [68-83] 81  Resp:  [16-24] 16  BP: (145-186)/(56-93) 174/88  SpO2:  [94 %-96 %] 94 %    Constitutional: Awake, alert, cooperative, no apparent distress   Lungs: Congestion to auscultation bilaterally, few crackles some wheezing   Cardiovascular: Regular rate and rhythm, normal S1 and S2, and no murmur noted   Abdomen: Normal bowel sounds, soft, non-distended, non-tender   Skin: No rashes, no cyanosis, no edema   Other:           Data:   All microbiology laboratory data reviewed.  Recent Labs   Lab Test  04/04/17   1215  04/04/17   0554  04/03/17   0625  04/02/17   0630   WBC   --   4.9  3.1*  3.0*   HGB  8.0*  6.7*  7.0*  7.4*   HCT   --   20.8*  22.2*  23.5*   MCV   --   91  94  93   PLT   --   118*  100*  100*     Recent Labs   Lab Test  04/04/17   0554  04/03/17   0625  04/02/17   0630   CR  7.55*  " 6.42*  5.03*     No lab results found.  Recent Labs   Lab Test  03/30/17   0800  03/29/17   0131  03/28/17   2044  03/28/17 2015 03/28/17   1953  01/07/17   1150  01/05/17 2012 01/05/17 2000 05/19/16   1730   CULT  Light growth Normal subhash  Canceled, Test credited >10 Squamous epithelial cells/low power field indicates   oral contamination. Please recollect. Notification of test cancellation was   given to Jennifer Pacheco RN [id number] 0416 03.29.17 CF  *  10,000 to 50,000 colonies/mL mixed urogenital subhash  Susceptibility testing not routinely done    No growth  No growth  No growth  Incorrectly ordered by PCU/Clinic Canceled, Test credited ordered sputum culture   instead.    No growth  No growth  No growth

## 2017-04-04 NOTE — CONSULTS
"CLINICAL NUTRITION SERVICES  -  ASSESSMENT NOTE      RECOMMENDATIONS FOR MD/PROVIDER TO ORDER:   -Liberalization of dialysis diet per MD / Nephrology     Recommendations Ordered by Registered Dietitian (RD):   -Dialysis diet (w/120 g protein allowance) + high consistent CHO diet  -Nepro with meals TID  -Nephrocaps      Malnutrition: Patient does not meet criteria for malnutrition at this time          REASON FOR ASSESSMENT  Donald Raza is a 67 year old male seen by Registered Dietitian for LOS      NUTRITION HISTORY  - Information obtained from patient   - Patient is on a dialysis / high protein diet at home  - Typical food/fluid intake: Patient reports good appetite/intake prior to admission with no recent changes.   - Supplements - Nepro  - Patient with significant medical history of HIV, ESRD on HD (TuTHSat), CAD, HTN, DM2 (insulin requiring) and chronic anemia.       CURRENT NUTRITION ORDERS  Diet Order:     Moderate Consistent Carbohydrate and Dialysis   Nepro with meals TID     Current Intake/Tolerance:  -Patient reports that his appetite/intake has been good since admission. Per nutrition flowsheet patient has been eating % of meals TID.   -Additionally, patient reports that he has been drinking Nepro with meals TID   -Patient denies any factors affecting intake with the exception of diet restrictions. Patient notes that he feels limited by the protein restriction (90 g) and carbohydrate restriction    PHYSICAL FINDINGS  Observed  No nutrition-related physical findings observed  Obtained from Chart/Interdisciplinary Team  -Last BM = 4/4    ANTHROPOMETRICS  Height: 5' 11\"  Weight: 202 lbs 6.12 oz (91.8 kg)  Dry weight of admission (3/30): 81.4 kg   Body mass index is 28.23 kg/(m^2).  Weight Status:  Overweight BMI 25-29.9  IBW: 78.18 kg   % IBW: 104%  Weight History: The data below suggests that patient's weight has been stable at 80 kg +/- 1 kg over the past three months.   Wt Readings from Last 10 " Encounters:   04/04/17 91.8 kg (202 lb 6.1 oz)   01/29/17 79.9 kg (176 lb 3.2 oz)   01/22/17 82.6 kg (182 lb 1.6 oz)   11/28/16 88.9 kg (196 lb)   11/28/16 86.2 kg (190 lb)   11/07/16 86.2 kg (190 lb)   09/19/16 87.2 kg (192 lb 3.2 oz)   08/29/16 88.6 kg (195 lb 4.8 oz)   08/18/16 85.2 kg (187 lb 12.8 oz)   08/15/16 87 kg (191 lb 12.8 oz)   ]    LABS  Labs reviewed  -Cr - 7.55 mg/dL (H) --> consistent with ESRD  -K+ - 4.4 mmol/L (wnl)  -No Phos    MEDICATIONS  Medications reviewed  -Nephrocaps   -Phoslo - 2001 mg TID   -Medium SSI before meals and at bedtime  -Novolog - 5 units before meals TID  -Lantus - 35 units every AM  -Cellcept     Dosing Weight: 81.4 kg (dry weight)    ASSESSED NUTRITION NEEDS:  Estimated Energy Needs: 1142-2492  kcals (25-30 Kcal/Kg)  Justification: maintenance  Estimated Protein Needs:  grams protein (1.2-1.5 g pro/Kg)  Justification: Hemodialysis  Estimated Fluid Needs: 7484-2634  mL (1 mL/Kcal)  Justification: maintenance    MALNUTRITION:  % Weight Loss:  None noted  % Intake:  No decreased intake noted  Subcutaneous Fat Loss:  None observed  Muscle Loss:  None observed  Fluid Retention:  None noted    Malnutrition Diagnosis: Patient does not meet two of the above criteria necessary for diagnosing malnutrition    NUTRITION DIAGNOSIS:  No nutrition diagnosis identified at this time    NUTRITION INTERVENTIONS  Recommendations / Nutrition Prescription  1. Dialysis diet w/increased protein allowance of 120 g/day + high consistent CHO diet    2. Nepro with meals TID     3. Continue Nephrocaps daily     Implementation  Nutrition education: Per Provider order if indicated   Composition of Meals and Snacks - Modified diet as indicated above. Further diet liberalization per MD / Nephrology     Medical Food Supplement - Continue to offer Nepro with meals TID     Collaboration and Referral of Nutrition care - Discussed patient during interdisciplinary rounds. Also spoke with RN regarding  intake/appetite     Nutrition Goals  Pt to consume >/=75% of meals TID + 2 supplements/day    MONITORING AND EVALUATION:  Food intake and Liquid meal replacement or supplement - Adequacy of intake (% of meals, supplements)    Marianne Mendoza RD, LD  Clinical Dietitian  3rd Floor/ICU Pager: [phone]  All Other Floors Pager: [phone]  Weekend/Holiday Pager: [phone]

## 2017-04-04 NOTE — PROGRESS NOTES
Renal Medicine Inpatient Dialysis Note                                Donald Raza MRN# [id number]   Age: 67 year old YOB: 1950   Date of Admission: 3/28/2017 Hospital LOS: 7          Assessment/Plan:     Admitted fever, cough and weakness.  Following for management of ESRD    1.  ESRD   -TTHS schedule   -dry weight 83.5 kg   -AVF left  2.  Anemia   -MIHAI   -transfuse as indicated  3.  HTN  4.  Influenza  5.  PNA  6.  HIV  7.  DM      Continue TTHS schedule  Next dialysis 04/06/17        Interval History:     Seen during course of dialysis.  Run parameters reviewed with dialysis RN  3.5 hour run on AVF at 450 ml/min.  3K bath.  UF 2 to 3 liter.  BP moderately elevated  170 systolic.  2 units red cells given on run.    No complaints.  Denies CP or SOB.  Follows.    ROS     GENERAL: NAD, No fever,chills  R: NEGATIVE for significant cough or SOB  CV: NEGATIVE for chest pain, palpitations  EXT: no change edema  ROS otherwise negative    Dialysis Parameters:     Vitals were reviewed  Patient Vitals for the past 8 hrs:   BP Temp Temp src Heart Rate Resp Weight   04/04/17 1100 169/81 - - 77 - -   04/04/17 1045 (!) 176/93 98  F (36.7  C) - 75 - -   04/04/17 1030 173/85 - - 78 - -   04/04/17 1015 175/85 - - 78 - -   04/04/17 1000 174/87 - - 75 - -   04/04/17 0945 173/87 97.9  F (36.6  C) - 78 - -   04/04/17 0930 171/84 - - 77 - -   04/04/17 0915 168/84 97.5  F (36.4  C) - 82 - -   04/04/17 0900 145/68 - - 79 - -   04/04/17 0845 157/56 96.8  F (36  C) Oral 74 - -   04/04/17 0830 162/83 - - 73 16 91.8 kg (202 lb 6.1 oz)   04/04/17 0451 - - - - - 91.8 kg (202 lb 6.1 oz)     I/O last 3 completed shifts:  In: 2260 [P.O.:2260]  Out: -     Vitals:    03/31/17 0311 04/01/17 0724 04/03/17 0606 04/04/17 0451   Weight: 83.8 kg (184 lb 11.9 oz) 86.5 kg (190 lb 11.2 oz) 84.8 kg (187 lb) 91.8 kg (202 lb 6.1 oz)    04/04/17 0830   Weight: 91.8 kg (202 lb 6.1 oz)       Current Weight: 91.8 ?  Dry Weight: 83.5  Dialysis Temp:  "36.5  C  Access Device: AVF  Access Site: left  Dialyzer: Revaclear  Dialysis Bath: 3  Sodium Profile: n  UF Goal: 2.5  Blood Flow Rate (mL/min): 450  Total Treatment Time (hrs): 3.5  Heparin: Low dose as required      EPO dose: y  Zemplar: n  IV Fe: n      Medications and Allergies:     Reviewed      Physical Exam:     Seen and examined during course of dialysis run    /81  Pulse 72  Temp 98  F (36.7  C)  Resp 16  Ht 1.803 m (5' 11\")  Wt 91.8 kg (202 lb 6.1 oz)  SpO2 94%  BMI 28.23 kg/m2    GENERAL: awake, alert, follows  HEENT: NC/AT, PERRLA, EOMI, non icteric, pharynx moist without lesion  NECK: supple, no masses or adenopathy  RESP: symmetric excursion, good effort, lungs clear - no rales, rhonchi or wheezes  CV: RRR, normal S1 S2  ABDOMEN: S/NT, BS present  MS: no edema  EXT: access canulated without issue  NEURO: speech normal and cranial nerves 2-12 intact  PSYCH: affect normal/bright    Data:         Recent Labs  Lab 04/04/17  0554      POTASSIUM 4.4   CHLORIDE 106   CO2 23   ANIONGAP 12   *   BUN 71*   CR 7.55*   GFRESTIMATED 7*   GFRESTBLACK 9*   PRABHA 8.9       Recent Labs  Lab 04/04/17  0554 04/03/17  0625 04/02/17  0630 04/01/17  0715   HGB 6.7* 7.0* 7.4* 7.5*         G David Fuller    [facility] - Nephrology  [phone]          "

## 2017-04-04 NOTE — PROGRESS NOTES
Potassium   Date Value Ref Range Status   04/04/2017 4.4 3.4 - 5.3 mmol/L Final   ]  Lab Results   Component Value Date    HGB 6.7 04/04/2017     Weight: 91.8 kg (202 lb 6.1 oz)    DIALYSIS PROCEDURE NOTE    Patient dialyzed for 3.5 hrs on a 3 K bath with a  net fluid removal of 2L.  A BFR of 450ml/min was obtained via LUAF and all connections secured.   Patient was seen by  during treatment.  Total heparin received during treatment:: 0units.   Meds given:EPO. 2uRBCs given. Complications:goal matched Last hour due to cramping (today's wt NOT correct)   Procedure and ESRD teaching done and questions answered.  See flowsheet in EPIC for further details.    RO log completed  Prime given: NS  Saline double clamped and Arterial/Venous parameters set.   Patient dialyzed at bedside due to ISO   Aseptic prep done for both on/off.  Transducer connectors checked q15 minutes with vital sign check  :  Report received from: MJ Severson RN  Report given to: MJ severson RN  Outpatient Dialysis at  [place]    Hepatitis Antigen neg  Hepatitis Antibody immune 1/18/17

## 2017-04-04 NOTE — PLAN OF CARE
Dialysis in room this am  No stools this shift. Appetite good.  Up to chair for meals. Lungs course and dyspnic with activity. Tele SR.  Hemoglobin 8.0 after 2 units of blood.

## 2017-04-04 NOTE — PROGRESS NOTES
Hematology Progress Note    Patient appears comfortable, remains afebrile. Scheduled to receive an additional 2 units prbc during dialysis today.     Hgb 6.7 today, platelet count 118,000.     Peripheral blood morphology revealed no evidence of microangiopathic changes or a leukoerythroblastic picture. Red cells revealed mild anisopoikilocytosis, consistent with his recent transfusions. Platelet morphology was unremarkable.     IMPRESSION:  Chronic anemia and mild intermittent thrombocytopenia, likely related to anemia of chronic renal disease and possibly mild bone marrow suppression from his medications. Would continue on Epo and transfuse as needed. No need for a bone marrow biopsy at this time or a change in his medications. Discussed with the patient. We can follow up as needed. Thank you.     Serge Alex M.D.  [facility]  4/4/2017   8:19 AM

## 2017-04-04 NOTE — PLAN OF CARE
"Problem: Goal Outcome Summary  Goal: Goal Outcome Summary  Outcome: No Change  /73 (BP Location: Right arm)  Pulse 72  Temp 97.4  F (36.3  C) (Oral)  Resp 24  Ht 1.803 m (5' 11\")  Wt 84.8 kg (187 lb)  SpO2 94%  BMI 26.08 kg/m2  Neuro: A&O x4, cooperative with cares  Pain: c/o mild pain in left hip, PRN tylenol given  Resp: LS diminished with crackles, frequent cough, unable to cough up sputum. IS encoraged  Cardiac: BP elevated and HR WDL, tele: SR, had 9 beat run of junctional rhythm at 5:54pm, Pt asymptomatic, MD aware.  GI/: Pt report loose stool, on hemodialysis, voids occasionally,   Diet: tolerating dialysis/mod carb diet  Skin/mobility: fistula left arm, up with SBA d/t c/o dizziness earlier.  Sepsis protocol triggered, lactic 1.0.   Will continue to monitor and provide supportive care.             "

## 2017-04-05 LAB
ERYTHROCYTE [DISTWIDTH] IN BLOOD BY AUTOMATED COUNT: 15.1 % (ref 10–15)
GLUCOSE BLDC GLUCOMTR-MCNC: 151 MG/DL (ref 70–99)
GLUCOSE BLDC GLUCOMTR-MCNC: 176 MG/DL (ref 70–99)
GLUCOSE BLDC GLUCOMTR-MCNC: 202 MG/DL (ref 70–99)
GLUCOSE BLDC GLUCOMTR-MCNC: 212 MG/DL (ref 70–99)
GLUCOSE BLDC GLUCOMTR-MCNC: 81 MG/DL (ref 70–99)
HCT VFR BLD AUTO: 23.8 % (ref 40–53)
HGB BLD-MCNC: 7.6 G/DL (ref 13.3–17.7)
MCH RBC QN AUTO: 29.2 PG (ref 26.5–33)
MCHC RBC AUTO-ENTMCNC: 31.9 G/DL (ref 31.5–36.5)
MCV RBC AUTO: 92 FL (ref 78–100)
PLATELET # BLD AUTO: 106 10E9/L (ref 150–450)
RBC # BLD AUTO: 2.6 10E12/L (ref 4.4–5.9)
WBC # BLD AUTO: 5.2 10E9/L (ref 4–11)

## 2017-04-05 PROCEDURE — A9270 NON-COVERED ITEM OR SERVICE: HCPCS | Mod: GY | Performed by: INTERNAL MEDICINE

## 2017-04-05 PROCEDURE — 85027 COMPLETE CBC AUTOMATED: CPT | Performed by: INTERNAL MEDICINE

## 2017-04-05 PROCEDURE — 25000131 ZZH RX MED GY IP 250 OP 636 PS 637: Mod: GY

## 2017-04-05 PROCEDURE — 25000128 H RX IP 250 OP 636: Performed by: INTERNAL MEDICINE

## 2017-04-05 PROCEDURE — 25000131 ZZH RX MED GY IP 250 OP 636 PS 637: Mod: GY | Performed by: HOSPITALIST

## 2017-04-05 PROCEDURE — 99232 SBSQ HOSP IP/OBS MODERATE 35: CPT | Performed by: INTERNAL MEDICINE

## 2017-04-05 PROCEDURE — 12000007 ZZH R&B INTERMEDIATE

## 2017-04-05 PROCEDURE — 25000132 ZZH RX MED GY IP 250 OP 250 PS 637: Mod: GY | Performed by: INTERNAL MEDICINE

## 2017-04-05 PROCEDURE — 36415 COLL VENOUS BLD VENIPUNCTURE: CPT | Performed by: INTERNAL MEDICINE

## 2017-04-05 PROCEDURE — 00000146 ZZHCL STATISTIC GLUCOSE BY METER IP

## 2017-04-05 PROCEDURE — 25000125 ZZHC RX 250: Performed by: INTERNAL MEDICINE

## 2017-04-05 PROCEDURE — 40000275 ZZH STATISTIC RCP TIME EA 10 MIN

## 2017-04-05 PROCEDURE — 94640 AIRWAY INHALATION TREATMENT: CPT | Mod: 76

## 2017-04-05 PROCEDURE — 94640 AIRWAY INHALATION TREATMENT: CPT

## 2017-04-05 RX ORDER — ALBUTEROL SULFATE 0.83 MG/ML
2.5 SOLUTION RESPIRATORY (INHALATION)
Status: DISCONTINUED | OUTPATIENT
Start: 2017-04-05 | End: 2017-04-11 | Stop reason: HOSPADM

## 2017-04-05 RX ORDER — AMLODIPINE BESYLATE 10 MG/1
10 TABLET ORAL AT BEDTIME
Status: DISCONTINUED | OUTPATIENT
Start: 2017-04-05 | End: 2017-04-11 | Stop reason: HOSPADM

## 2017-04-05 RX ORDER — IPRATROPIUM BROMIDE AND ALBUTEROL SULFATE 2.5; .5 MG/3ML; MG/3ML
3 SOLUTION RESPIRATORY (INHALATION)
Status: DISCONTINUED | OUTPATIENT
Start: 2017-04-05 | End: 2017-04-11

## 2017-04-05 RX ORDER — IPRATROPIUM BROMIDE AND ALBUTEROL SULFATE 2.5; .5 MG/3ML; MG/3ML
3 SOLUTION RESPIRATORY (INHALATION) EVERY 4 HOURS PRN
Status: DISCONTINUED | OUTPATIENT
Start: 2017-04-05 | End: 2017-04-11

## 2017-04-05 RX ADMIN — AMLODIPINE BESYLATE 10 MG: 10 TABLET ORAL at 21:48

## 2017-04-05 RX ADMIN — LOSARTAN POTASSIUM 100 MG: 100 TABLET, FILM COATED ORAL at 21:47

## 2017-04-05 RX ADMIN — ASPIRIN 81 MG CHEWABLE TABLET 81 MG: 81 TABLET CHEWABLE at 21:47

## 2017-04-05 RX ADMIN — ACETAMINOPHEN 650 MG: 325 TABLET, FILM COATED ORAL at 03:08

## 2017-04-05 RX ADMIN — CALCIUM ACETATE 2001 MG: 667 CAPSULE ORAL at 14:24

## 2017-04-05 RX ADMIN — ISOSORBIDE MONONITRATE 60 MG: 30 TABLET, EXTENDED RELEASE ORAL at 21:48

## 2017-04-05 RX ADMIN — DAPSONE 100 MG: 25 TABLET ORAL at 21:47

## 2017-04-05 RX ADMIN — INSULIN GLARGINE 35 UNITS: 100 INJECTION, SOLUTION SUBCUTANEOUS at 09:00

## 2017-04-05 RX ADMIN — CARVEDILOL 12.5 MG: 12.5 TABLET, FILM COATED ORAL at 08:24

## 2017-04-05 RX ADMIN — TAZOBACTAM SODIUM AND PIPERACILLIN SODIUM 2.25 G: 250; 2 INJECTION, SOLUTION INTRAVENOUS at 05:00

## 2017-04-05 RX ADMIN — HYDRALAZINE HYDROCHLORIDE 25 MG: 25 TABLET ORAL at 08:24

## 2017-04-05 RX ADMIN — Medication 1 CAPSULE: at 21:49

## 2017-04-05 RX ADMIN — DARUNAVIR 800 MG: 800 TABLET, FILM COATED ORAL at 21:49

## 2017-04-05 RX ADMIN — MYCOPHENOLATE MOFETIL 500 MG: 250 CAPSULE ORAL at 21:47

## 2017-04-05 RX ADMIN — IPRATROPIUM BROMIDE AND ALBUTEROL SULFATE 3 ML: .5; 3 SOLUTION RESPIRATORY (INHALATION) at 15:01

## 2017-04-05 RX ADMIN — HYDRALAZINE HYDROCHLORIDE 25 MG: 25 TABLET ORAL at 21:49

## 2017-04-05 RX ADMIN — RITONAVIR 100 MG: 100 TABLET, FILM COATED ORAL at 21:49

## 2017-04-05 RX ADMIN — GABAPENTIN 600 MG: 300 CAPSULE ORAL at 21:47

## 2017-04-05 RX ADMIN — CARVEDILOL 12.5 MG: 12.5 TABLET, FILM COATED ORAL at 19:01

## 2017-04-05 RX ADMIN — TAZOBACTAM SODIUM AND PIPERACILLIN SODIUM 2.25 G: 250; 2 INJECTION, SOLUTION INTRAVENOUS at 00:35

## 2017-04-05 RX ADMIN — MAGNESIUM OXIDE TAB 400 MG (241.3 MG ELEMENTAL MG) 400 MG: 400 (241.3 MG) TAB at 21:49

## 2017-04-05 RX ADMIN — IPRATROPIUM BROMIDE AND ALBUTEROL SULFATE 3 ML: .5; 3 SOLUTION RESPIRATORY (INHALATION) at 00:53

## 2017-04-05 RX ADMIN — CLOPIDOGREL 75 MG: 75 TABLET, FILM COATED ORAL at 21:47

## 2017-04-05 RX ADMIN — ABACAVIR 600 MG: 300 TABLET, FILM COATED ORAL at 21:48

## 2017-04-05 RX ADMIN — DOLUTEGRAVIR SODIUM 50 MG: 50 TABLET, FILM COATED ORAL at 21:49

## 2017-04-05 RX ADMIN — IPRATROPIUM BROMIDE AND ALBUTEROL SULFATE 3 ML: .5; 3 SOLUTION RESPIRATORY (INHALATION) at 19:56

## 2017-04-05 RX ADMIN — CALCIUM ACETATE 2001 MG: 667 CAPSULE ORAL at 19:00

## 2017-04-05 RX ADMIN — IPRATROPIUM BROMIDE AND ALBUTEROL SULFATE 3 ML: .5; 3 SOLUTION RESPIRATORY (INHALATION) at 08:08

## 2017-04-05 RX ADMIN — CALCIUM ACETATE 2001 MG: 667 CAPSULE ORAL at 08:24

## 2017-04-05 RX ADMIN — MYCOPHENOLATE MOFETIL 500 MG: 250 CAPSULE ORAL at 08:23

## 2017-04-05 RX ADMIN — ALBUTEROL SULFATE 2.5 MG: 2.5 SOLUTION RESPIRATORY (INHALATION) at 22:56

## 2017-04-05 RX ADMIN — Medication 2 LOZENGE: at 03:08

## 2017-04-05 RX ADMIN — GABAPENTIN 300 MG: 300 CAPSULE ORAL at 08:24

## 2017-04-05 RX ADMIN — ATORVASTATIN CALCIUM 40 MG: 40 TABLET, FILM COATED ORAL at 21:48

## 2017-04-05 RX ADMIN — HYDRALAZINE HYDROCHLORIDE 25 MG: 25 TABLET ORAL at 16:33

## 2017-04-05 RX ADMIN — OMEPRAZOLE 40 MG: 20 CAPSULE, DELAYED RELEASE ORAL at 08:23

## 2017-04-05 NOTE — PLAN OF CARE
Problem: Goal Outcome Summary  Goal: Goal Outcome Summary  Outcome: No Change    A&Ox4, SOB, FRANCO and chest tightness, see below intervention. PRN neb given with improvement. SBP in the 160s improved to 130s overnight. On 1L oxygen, sats mid 90s. Tele SR, lung sounds coarse/exp wheezes. Cont on Zosyn, Vanco stopped on 4/3, completed Tamiflu. . Chronic anemia, Hgb stable at 8.0, received total of 4 unit PRBC this hospital stay. HD on 4/4. Up SBA. Sleeping in between cares, will cont with POC.       0012: Web paged MD- Increased effort with breathing, C/o chest tightness. Lungs sounds coarse/exp. wheezes. NO PRN Neb order. Please advice. Thank you!     0014: addendum- Obtained new orders for Duonebs, will cont to monitor.

## 2017-04-05 NOTE — PROGRESS NOTES
"Date:4/5/2017  Admission Dx: Pneumonia, Influenza   Pulmonary History: Pulmonary hypertension  Home Nebulizer/MDI Use: N/A  Home Oxygen: none    Acuity Level (RCAT flow sheet):Level 3/QID    Aerosol Therapy initiated: Duoneb QID/ Q4 prn    Pulmonary Hygiene initiated: Deep breathe and cough     Volume Expansion initiated: Incentive Spirometer    Current Oxygen Requirements:1LPM Nc    Current SpO2: 99%    Re-evaluation date: 8 April 2017    Patient Education: Patient informed of medication benefit and possible side effect,    See \"RT Assessments\" flow sheet for patient assessment scoring and Acuity Level Details.       Romina Gerard RT  4/5/2017      "

## 2017-04-05 NOTE — PLAN OF CARE
Problem: Goal Outcome Summary  Goal: Goal Outcome Summary  PT: Orders received. Pt approached for Evaluation this PM. Pt sleeping at this time. Wakes to name, but quickly falls back to sleep. Will check back tomorrow AM. Nursing updated.

## 2017-04-05 NOTE — PLAN OF CARE
Problem: Individualization  Goal: Patient Preferences  Outcome: No Change  Lungs coarse, dyspnea with exertion. Frequent loose cough. Oxygen saturation 93% on room air. Frequ Appetite is poor. BG was 316 at supper, SSI given, held scheduled 5 units, patient ate less than 25% of meal. Loose BM x 1. Bowel sounds hyperactive.

## 2017-04-05 NOTE — PLAN OF CARE
Ambulating in room and ambulated in arciniega x1.  Notes general weakness.   Dyspnea with activity less this afternoon.  Nebs ordered prn for course decreased breathsounds

## 2017-04-05 NOTE — PROGRESS NOTES
[facility]  Hospitalist Progress Note  Marquez Tolliver,  04/05/2017    Reason for Stay (Diagnosis): Influenza, pneumonia         Assessment and Plan:      Summary of Stay: Donald Raza is a 67 year old male with a history of DM2, HIV, TIA, ESRD on HD, CAD, HTN admitted on 3/28/2017 with fever, cough, and weakness ultimately dx with influenza and PNA and started on tamiflu and vanco/zosyn, respectively. Has improved from a respiratory standpoint, ID managing antimicrobials. Had what seems to be brief non-cardiac CP, now resolved. Heme also consulted for recurrent anemia and TCP for which he has 4 units during hospital stay. Likely nearing discharge.      Problem List:   1. Influenza: Completed Tamiflu at renal adjusted doses per ID.  2. PNA: Procal elevated to about 5, infiltrate on CXR but recent HCAP. Afebrile.  Symptoms improved.  ID following.  Stop zosyn today. Vanco stopped 4/3.  3. Bronchospasm.  Due to recent Influenza and Pneumonia.  Feels much better after Duonebs QID started last night.  Add Albuterol PRN.  Lungs clear this morning.  4. Deconditioning.  PT and OT consults today.  5. ESRD on HD qTRS s/p failed transplant: No issues. Renal consulted. Continue Cellcept. Dialysis per Nephrology.  6. Acute on chronic anemia and TCP: No obvious bleeding. Unclear etiology, likely multifactorial. Recieved 2 U PRBC 3/30 and needs irradiated products. Has undergone GI w/u with recent neg EGD. Had 2 more units PRBC's on 4/4 with dialysis. Hgb 7.6 this morning.  Recheck CBC on 4/6.  7. CAD s/p PCI with stenting (within the past year): Stable, no CP. On DAP. Continue ASA 81, plavix, atorvastatin 40, coreg 12.5 bid, imdur 60 daily.   8. HIV: Appreciate ID input. Not likely active issue. Continue PTA norvir, tivicay, prezista, dapsone.  9. Hx of TIA: Stable, continue atorvastatin and ASA/plavix.  10. HTN:  Increase Norvasc to 10 mg/day.   Continue coreg, hydralazine 25 tid, imdur, losartan  "100.  11. DM: Increase scheduled Novolog to 6 units TID with meals.  Continue Lantus, Novolog SSI.         DVT Prophylaxis: Pneumatic Compression Devices  Code Status: Full Code  Discharge Dispo: home  Estimated Disch Date / # of Days until Disch: 1-2        Interval History (Subjective):      Short of breath last night.  Much improved after Duoneb.  Breathing at baseline this morning.  Occasional cough.  Feels weak.  No CP, F/C, N/V, or diarrhea.                  Physical Exam:      Last Vital Signs:  /73  Pulse 72  Temp 97  F (36.1  C) (Oral)  Resp 20  Ht 1.803 m (5' 11\")  Wt 90.7 kg (199 lb 14.4 oz)  SpO2 98%  BMI 27.88 kg/m2    Gen:  NAD, A&Ox3.  Eyes:  PERRL, sclera anicteric.  OP:  MMM, no lesions.  Neck:  Supple.  CV:  Regular, no murmurs.  Lung:  CTA b/l, normal effort.  Ab:  +BS, soft.  Skin:  Warm, dry to touch.  No rash.  Ext:  No pitting edema LE b/l.           Medications:      All current medications were reviewed with changes reflected in problem list.         Data:      All new lab and imaging data was reviewed.   Labs:    Recent Labs  Lab 04/04/17  0554      POTASSIUM 4.4   CHLORIDE 106   CO2 23   ANIONGAP 12   *   BUN 71*   CR 7.55*   GFRESTIMATED 7*   GFRESTBLACK 9*   PRABHA 8.9       Recent Labs  Lab 04/05/17  0623   WBC 5.2   HGB 7.6*   HCT 23.8*   MCV 92   *      Imaging:   No results found for this or any previous visit (from the past 24 hour(s)).    "

## 2017-04-05 NOTE — PROGRESS NOTES
[facility]  Infectious Disease Progress Note          Assessment and Plan:   IMPRESSION:   1. A 67-year-old male, complicated medical history, admitted with acute respiratory symptoms found to be influenza A positive, almost certainly that is the primary diagnosis, some infiltrate, certainly at risk for secondary pneumonia, being treated as such.   2. History of human immunodeficiency virus infection, without major opportunistic infections controlled disease, most recent T cells somewhat low at 184, but not likely to have an opportunistic infection.   3. Prior hospitalization in January with chest pain and a history of coronary disease.   4. Nosocomial pneumonia as a complication of the above hospitalization including intubation in January, respiratory status stable since.   5. Diabetes mellitus.   6. End-stage renal disease on chronic dialysis.   7. Sulfa allergy.   8. History of nephrolithiasis.   9 Anemia      RECOMMENDATIONS:   1. Completed influenza tx with Tamiflu at renal adjusted dose.   2.  Zosyn as though possible secondary pneumonia, greater than 1 week stop tx, off vanco  3. Follow clinically, probably not prolonged antibiotics needed here depending on clinical response; hold on a bigger workup for now. T down O2 OK,  4. Continue HIV meds as previously.          Interval History:   no new complaints and doing Ok resolved chest pain, O2 Ok on RA, no + cxs LGF resolved              Medications:       ipratropium - albuterol 0.5 mg/2.5 mg/3 mL  3 mL Nebulization 4x daily     insulin aspart  6 Units Subcutaneous TID AC     amLODIPine  10 mg Oral At Bedtime     insulin glargine  35 Units Subcutaneous QAM     - MEDICATION INSTRUCTIONS for Dialysis Patients -   Does not apply See Admin Instructions     omeprazole  40 mg Oral Daily     magnesium oxide (MAG-OX) tablet 400 mg  400 mg Oral At Bedtime     losartan  100 mg Oral At Bedtime     hydrALAZINE  25 mg Oral TID     gabapentin  600 mg  "Oral QPM     B complex-C-folic acid  1 capsule Oral QPM     insulin aspart  1-10 Units Subcutaneous TID AC     insulin aspart  1-7 Units Subcutaneous At Bedtime     mycophenolate  500 mg Oral BID     aspirin chewable tablet 81 mg  81 mg Oral QPM     atorvastatin  40 mg Oral At Bedtime     calcium acetate  2,001 mg Oral TID w/meals     carvedilol  12.5 mg Oral BID w/meals     clopidogrel (PLAVIX) tablet 75 mg  75 mg Oral QPM     dapsone  100 mg Oral At Bedtime     dolutegravir  50 mg Oral At Bedtime     darunavir  800 mg Oral At Bedtime     gabapentin  300 mg Oral QAM     isosorbide mononitrate  60 mg Oral QPM     ritonavir  100 mg Oral At Bedtime     abacavir  600 mg Oral QPM                  Physical Exam:   Blood pressure 165/74, pulse 72, temperature 97.2  F (36.2  C), temperature source Axillary, resp. rate 20, height 1.803 m (5' 11\"), weight 90.7 kg (199 lb 14.4 oz), SpO2 95 %.  Wt Readings from Last 2 Encounters:   04/05/17 90.7 kg (199 lb 14.4 oz)   01/29/17 79.9 kg (176 lb 3.2 oz)     Vital Signs with Ranges  Temp:  [96.4  F (35.8  C)-98.5  F (36.9  C)] 97.2  F (36.2  C)  Heart Rate:  [71-86] 82  Resp:  [18-20] 20  BP: (138-185)/(64-83) 165/74  SpO2:  [92 %-99 %] 95 %    Constitutional: Awake, alert, cooperative, no apparent distress   Lungs: Congestion to auscultation bilaterally, few crackles some wheezing   Cardiovascular: Regular rate and rhythm, normal S1 and S2, and no murmur noted   Abdomen: Normal bowel sounds, soft, non-distended, non-tender   Skin: No rashes, no cyanosis, no edema   Other:           Data:   All microbiology laboratory data reviewed.  Recent Labs   Lab Test  04/05/17   0623  04/04/17   1215  04/04/17   0554  04/03/17   0625   WBC  5.2   --   4.9  3.1*   HGB  7.6*  8.0*  6.7*  7.0*   HCT  23.8*   --   20.8*  22.2*   MCV  92   --   91  94   PLT  106*   --   118*  100*     Recent Labs   Lab Test  04/04/17   0554  04/03/17   0625  04/02/17   0630   CR  7.55*  6.42*  5.03*     No lab " results found.  Recent Labs   Lab Test  03/30/17   0800  03/29/17   0131  03/28/17   2044  03/28/17 2015 03/28/17   1953  01/07/17   1150  01/05/17 2012 01/05/17 2000 05/19/16   1730   CULT  Light growth Normal subhash  Canceled, Test credited >10 Squamous epithelial cells/low power field indicates   oral contamination. Please recollect. Notification of test cancellation was   given to Jennifer Pacheco RN [id number] 03.29.17 CF  *  10,000 to 50,000 colonies/mL mixed urogenital subhash  Susceptibility testing not routinely done    No growth  No growth  No growth  Incorrectly ordered by PCU/Clinic Canceled, Test credited ordered sputum culture   instead.    No growth  No growth  No growth

## 2017-04-06 ENCOUNTER — TELEPHONE (OUTPATIENT)
Dept: CARDIOLOGY | Facility: CLINIC | Age: 67
End: 2017-04-06

## 2017-04-06 LAB
ANION GAP SERPL CALCULATED.3IONS-SCNC: 11 MMOL/L (ref 3–14)
BUN SERPL-MCNC: 66 MG/DL (ref 7–30)
CALCIUM SERPL-MCNC: 9.4 MG/DL (ref 8.5–10.1)
CHLORIDE SERPL-SCNC: 103 MMOL/L (ref 94–109)
CO2 SERPL-SCNC: 25 MMOL/L (ref 20–32)
CREAT SERPL-MCNC: 7.26 MG/DL (ref 0.66–1.25)
ERYTHROCYTE [DISTWIDTH] IN BLOOD BY AUTOMATED COUNT: 14.9 % (ref 10–15)
GFR SERPL CREATININE-BSD FRML MDRD: 8 ML/MIN/1.7M2
GLUCOSE BLDC GLUCOMTR-MCNC: 116 MG/DL (ref 70–99)
GLUCOSE BLDC GLUCOMTR-MCNC: 127 MG/DL (ref 70–99)
GLUCOSE BLDC GLUCOMTR-MCNC: 202 MG/DL (ref 70–99)
GLUCOSE BLDC GLUCOMTR-MCNC: 225 MG/DL (ref 70–99)
GLUCOSE BLDC GLUCOMTR-MCNC: 317 MG/DL (ref 70–99)
GLUCOSE SERPL-MCNC: 156 MG/DL (ref 70–99)
HCT VFR BLD AUTO: 24 % (ref 40–53)
HGB BLD-MCNC: 7.4 G/DL (ref 13.3–17.7)
MCH RBC QN AUTO: 28.5 PG (ref 26.5–33)
MCHC RBC AUTO-ENTMCNC: 30.8 G/DL (ref 31.5–36.5)
MCV RBC AUTO: 92 FL (ref 78–100)
PLATELET # BLD AUTO: 111 10E9/L (ref 150–450)
POTASSIUM SERPL-SCNC: 4.5 MMOL/L (ref 3.4–5.3)
PROCALCITONIN SERPL-MCNC: 1.46 NG/ML
RBC # BLD AUTO: 2.6 10E12/L (ref 4.4–5.9)
SODIUM SERPL-SCNC: 139 MMOL/L (ref 133–144)
WBC # BLD AUTO: 5.7 10E9/L (ref 4–11)

## 2017-04-06 PROCEDURE — 25000128 H RX IP 250 OP 636: Performed by: INTERNAL MEDICINE

## 2017-04-06 PROCEDURE — 25000125 ZZHC RX 250: Performed by: INTERNAL MEDICINE

## 2017-04-06 PROCEDURE — 25000132 ZZH RX MED GY IP 250 OP 250 PS 637: Mod: GY | Performed by: INTERNAL MEDICINE

## 2017-04-06 PROCEDURE — 94640 AIRWAY INHALATION TREATMENT: CPT | Mod: 76

## 2017-04-06 PROCEDURE — 12000007 ZZH R&B INTERMEDIATE

## 2017-04-06 PROCEDURE — 80048 BASIC METABOLIC PNL TOTAL CA: CPT | Performed by: INTERNAL MEDICINE

## 2017-04-06 PROCEDURE — A9270 NON-COVERED ITEM OR SERVICE: HCPCS | Mod: GY | Performed by: INTERNAL MEDICINE

## 2017-04-06 PROCEDURE — 85027 COMPLETE CBC AUTOMATED: CPT | Performed by: INTERNAL MEDICINE

## 2017-04-06 PROCEDURE — 40000275 ZZH STATISTIC RCP TIME EA 10 MIN

## 2017-04-06 PROCEDURE — 63400005 ZZH RX 634: Performed by: INTERNAL MEDICINE

## 2017-04-06 PROCEDURE — 25000131 ZZH RX MED GY IP 250 OP 636 PS 637: Mod: GY

## 2017-04-06 PROCEDURE — 84145 PROCALCITONIN (PCT): CPT | Performed by: INTERNAL MEDICINE

## 2017-04-06 PROCEDURE — 90937 HEMODIALYSIS REPEATED EVAL: CPT

## 2017-04-06 PROCEDURE — A9270 NON-COVERED ITEM OR SERVICE: HCPCS | Mod: GY | Performed by: HOSPITALIST

## 2017-04-06 PROCEDURE — 99233 SBSQ HOSP IP/OBS HIGH 50: CPT | Performed by: INTERNAL MEDICINE

## 2017-04-06 PROCEDURE — 00000146 ZZHCL STATISTIC GLUCOSE BY METER IP

## 2017-04-06 PROCEDURE — 36415 COLL VENOUS BLD VENIPUNCTURE: CPT | Performed by: INTERNAL MEDICINE

## 2017-04-06 PROCEDURE — 25000131 ZZH RX MED GY IP 250 OP 636 PS 637: Mod: GY | Performed by: INTERNAL MEDICINE

## 2017-04-06 PROCEDURE — 25000132 ZZH RX MED GY IP 250 OP 250 PS 637: Mod: GY | Performed by: HOSPITALIST

## 2017-04-06 PROCEDURE — 94640 AIRWAY INHALATION TREATMENT: CPT

## 2017-04-06 RX ORDER — OSELTAMIVIR PHOSPHATE 30 MG/1
30 CAPSULE ORAL
Status: COMPLETED | OUTPATIENT
Start: 2017-04-06 | End: 2017-04-08

## 2017-04-06 RX ADMIN — CALCIUM ACETATE 2001 MG: 667 CAPSULE ORAL at 12:39

## 2017-04-06 RX ADMIN — ABACAVIR 600 MG: 300 TABLET, FILM COATED ORAL at 21:26

## 2017-04-06 RX ADMIN — BENZONATATE 100 MG: 100 CAPSULE ORAL at 02:23

## 2017-04-06 RX ADMIN — Medication 1 LOZENGE: at 21:55

## 2017-04-06 RX ADMIN — Medication 2 LOZENGE: at 19:53

## 2017-04-06 RX ADMIN — AMLODIPINE BESYLATE 10 MG: 10 TABLET ORAL at 21:29

## 2017-04-06 RX ADMIN — DOLUTEGRAVIR SODIUM 50 MG: 50 TABLET, FILM COATED ORAL at 21:31

## 2017-04-06 RX ADMIN — Medication 2 LOZENGE: at 04:00

## 2017-04-06 RX ADMIN — Medication 2 LOZENGE: at 07:28

## 2017-04-06 RX ADMIN — MAGNESIUM OXIDE TAB 400 MG (241.3 MG ELEMENTAL MG) 400 MG: 400 (241.3 MG) TAB at 21:30

## 2017-04-06 RX ADMIN — IPRATROPIUM BROMIDE AND ALBUTEROL SULFATE 3 ML: .5; 3 SOLUTION RESPIRATORY (INHALATION) at 16:10

## 2017-04-06 RX ADMIN — MYCOPHENOLATE MOFETIL 500 MG: 250 CAPSULE ORAL at 21:30

## 2017-04-06 RX ADMIN — IPRATROPIUM BROMIDE AND ALBUTEROL SULFATE 3 ML: .5; 3 SOLUTION RESPIRATORY (INHALATION) at 20:20

## 2017-04-06 RX ADMIN — ACETAMINOPHEN 650 MG: 325 TABLET, FILM COATED ORAL at 21:03

## 2017-04-06 RX ADMIN — LOSARTAN POTASSIUM 100 MG: 100 TABLET, FILM COATED ORAL at 21:30

## 2017-04-06 RX ADMIN — GABAPENTIN 600 MG: 300 CAPSULE ORAL at 21:30

## 2017-04-06 RX ADMIN — CARVEDILOL 12.5 MG: 12.5 TABLET, FILM COATED ORAL at 12:40

## 2017-04-06 RX ADMIN — RITONAVIR 100 MG: 100 TABLET, FILM COATED ORAL at 21:31

## 2017-04-06 RX ADMIN — Medication 1 CAPSULE: at 21:31

## 2017-04-06 RX ADMIN — CALCIUM ACETATE 2001 MG: 667 CAPSULE ORAL at 18:49

## 2017-04-06 RX ADMIN — HYDRALAZINE HYDROCHLORIDE 25 MG: 25 TABLET ORAL at 12:40

## 2017-04-06 RX ADMIN — ISOSORBIDE MONONITRATE 60 MG: 30 TABLET, EXTENDED RELEASE ORAL at 21:30

## 2017-04-06 RX ADMIN — OSELTAMIVIR PHOSPHATE 30 MG: 30 CAPSULE ORAL at 12:41

## 2017-04-06 RX ADMIN — EPOETIN ALFA 8000 UNITS: 4000 SOLUTION INTRAVENOUS; SUBCUTANEOUS at 08:30

## 2017-04-06 RX ADMIN — Medication 2 LOZENGE: at 02:23

## 2017-04-06 RX ADMIN — CALCIUM ACETATE 2001 MG: 667 CAPSULE ORAL at 07:43

## 2017-04-06 RX ADMIN — GABAPENTIN 300 MG: 300 CAPSULE ORAL at 12:40

## 2017-04-06 RX ADMIN — CARVEDILOL 12.5 MG: 12.5 TABLET, FILM COATED ORAL at 18:53

## 2017-04-06 RX ADMIN — DAPSONE 100 MG: 25 TABLET ORAL at 21:27

## 2017-04-06 RX ADMIN — INSULIN GLARGINE 38 UNITS: 100 INJECTION, SOLUTION SUBCUTANEOUS at 09:26

## 2017-04-06 RX ADMIN — DARUNAVIR 800 MG: 800 TABLET, FILM COATED ORAL at 21:27

## 2017-04-06 RX ADMIN — GUAIFENESIN 10 ML: 100 SOLUTION ORAL at 02:23

## 2017-04-06 RX ADMIN — MYCOPHENOLATE MOFETIL 500 MG: 250 CAPSULE ORAL at 12:40

## 2017-04-06 RX ADMIN — ASPIRIN 81 MG CHEWABLE TABLET 81 MG: 81 TABLET CHEWABLE at 21:31

## 2017-04-06 RX ADMIN — SODIUM CHLORIDE 250 ML: 9 INJECTION, SOLUTION INTRAVENOUS at 07:00

## 2017-04-06 RX ADMIN — OMEPRAZOLE 40 MG: 20 CAPSULE, DELAYED RELEASE ORAL at 12:40

## 2017-04-06 RX ADMIN — IPRATROPIUM BROMIDE AND ALBUTEROL SULFATE 3 ML: .5; 3 SOLUTION RESPIRATORY (INHALATION) at 12:02

## 2017-04-06 RX ADMIN — ACETAMINOPHEN 650 MG: 325 TABLET, FILM COATED ORAL at 02:23

## 2017-04-06 RX ADMIN — ATORVASTATIN CALCIUM 40 MG: 40 TABLET, FILM COATED ORAL at 21:30

## 2017-04-06 RX ADMIN — HYDRALAZINE HYDROCHLORIDE 25 MG: 25 TABLET ORAL at 16:30

## 2017-04-06 RX ADMIN — GUAIFENESIN 10 ML: 100 SOLUTION ORAL at 19:53

## 2017-04-06 RX ADMIN — HYDRALAZINE HYDROCHLORIDE 25 MG: 25 TABLET ORAL at 21:31

## 2017-04-06 RX ADMIN — PREDNISONE 60 MG: 10 TABLET ORAL at 12:40

## 2017-04-06 RX ADMIN — CLOPIDOGREL 75 MG: 75 TABLET, FILM COATED ORAL at 21:23

## 2017-04-06 NOTE — PROGRESS NOTES
Renal Medicine Inpatient Dialysis Note                                Donald Raza MRN# [id number]   Age: 67 year old YOB: 1950   Date of Admission: 3/28/2017 Hospital LOS: 9          Assessment/Plan:     Admitted fever, cough and weakness.  Following for management of ESRD    1.  ESRD   -TTHS schedule   -dry weight 83.5 kg   -AVF left  2.  Anemia   -MIHAI   -transfuse as indicated  3.  HTN  4.  Influenza  5.  PNA  6.  HIV  7.  DM      Continue TTHS schedule  Next dialysis 04/08/17        Interval History:     Seen during course of dialysis.  Run parameters reviewed with dialysis RN  3.5 hour run on AVF at 450 ml/min.  3K bath.  UF 2 to 3 liter.  BP moderately elevated  170 systolic.  Stable run    No complaints.  Denies CP or SOB.  Follows.    ROS     GENERAL: NAD, No fever,chills  R: NEGATIVE for significant cough or SOB  CV: NEGATIVE for chest pain, palpitations  EXT: no change edema  ROS otherwise negative    Dialysis Parameters:     Vitals were reviewed  Patient Vitals for the past 8 hrs:   BP Temp Temp src Pulse Heart Rate Resp SpO2 Weight   04/06/17 1030 157/82 - - 78 - - 98 % -   04/06/17 1015 165/81 - - 78 - - 99 % -   04/06/17 1000 161/77 - - 79 - - 99 % -   04/06/17 0945 161/81 - - 79 - - 98 % -   04/06/17 0930 159/82 - - 81 - - 98 % -   04/06/17 0915 (!) 171/91 - - 86 - - 99 % -   04/06/17 0900 183/86 - - 85 - - 99 % -   04/06/17 0845 172/87 - - 86 - - 98 % -   04/06/17 0830 166/85 - - 79 - - 99 % -   04/06/17 0815 165/81 - - 77 - - 97 % -   04/06/17 0800 167/81 - - 82 - - 100 % -   04/06/17 0750 173/87 97.6  F (36.4  C) Axillary 87 - 20 95 % -   04/06/17 0746 165/69 95.9  F (35.5  C) Axillary - 80 20 - -   04/06/17 0359 - - - - - - - 92.7 kg (204 lb 6.4 oz)     I/O last 3 completed shifts:  In: 150 [P.O.:150]  Out: 250 [Urine:250]    Vitals:    04/03/17 0606 04/04/17 0451 04/04/17 0830 04/05/17 0458   Weight: 84.8 kg (187 lb) 91.8 kg (202 lb 6.1 oz) 91.8 kg (202 lb 6.1 oz) 90.7 kg (199 lb  "14.4 oz)    04/06/17 0359   Weight: 92.7 kg (204 lb 6.4 oz)       Current Weight: 92.7 ?  Dry Weight: 83.5  Dialysis Temp: 36.5  C  Access Device: AVF  Access Site: left  Dialyzer: Revaclear  Dialysis Bath: 3  Sodium Profile: n  UF Goal: 2.5  Blood Flow Rate (mL/min): 450  Total Treatment Time (hrs): 3.5  Heparin: Low dose as required      EPO dose: y  Zemplar: n  IV Fe: n      Medications and Allergies:     Reviewed      Physical Exam:     Seen and examined during course of dialysis run    /82  Pulse 78  Temp 97.6  F (36.4  C) (Axillary)  Resp 20  Ht 1.803 m (5' 11\")  Wt 92.7 kg (204 lb 6.4 oz)  SpO2 98%  BMI 28.51 kg/m2    GENERAL: awake, alert, follows  HEENT: NC/AT, PERRLA, EOMI, non icteric, pharynx moist without lesion  NECK: supple, no masses or adenopathy  RESP: coarse  CV: RRR, normal S1 S2  ABDOMEN: S/NT, BS present  MS: no edema  EXT: access canulated without issue  NEURO: speech normal and cranial nerves 2-12 intact  PSYCH: affect normal/bright    Data:         Recent Labs  Lab 04/06/17  0800      POTASSIUM 4.5   CHLORIDE 103   CO2 25   ANIONGAP 11   *   BUN 66*   CR 7.26*   GFRESTIMATED 8*   GFRESTBLACK 9*   PRABHA 9.4       Recent Labs  Lab 04/06/17  0709 04/05/17  0623 04/04/17  1215 04/04/17  0554   HGB 7.4* 7.6* 8.0* 6.7*         G David Fuller    [facility] Nephrology  [phone]          "

## 2017-04-06 NOTE — PLAN OF CARE
Problem: Goal Outcome Summary  Goal: Goal Outcome Summary     PT: Orders received and chart reviewed. Patient admitted with ARS found to have influenza, Hgb 7.4 this AM and pt back from dialysis. Pt presently sleeping soundly, daughter present at bedside. Daughter reports patient had fairly sleepless night due to coughing and generally does nothing but sleep after dialysis waking only for meals.  Will plan to hold PT eval this day and r/s for 4/7

## 2017-04-06 NOTE — PLAN OF CARE
Problem: Goal Outcome Summary  Goal: Goal Outcome Summary  Outcome: No Change  VSS except BP   Neuro: AOX4  Respiratory: LS coarse, diminished on RA, frequent coughs   Cardiac: SR  GI: No BM noted  : SAGE AVF +ve bruit/thrill, ARMAND  Skin: Intact  Labs: Hgb 7.6  B, 217  Plan:dialysis, PT/OT, SSI, isolation   Pt calls appropriately, up with SBA, makes needs known, slept on and off through out the shift.

## 2017-04-06 NOTE — PLAN OF CARE
Problem: Goal Outcome Summary  Goal: Goal Outcome Summary  Outcome: No Change  A&Ox4, SOB, FRANCO. PRN neb given with improvement. Infrequent cough, received PRN robitussin, tessalon, lozenges and tylenol. Tele SR, lung sounds coarse, dim/exp wheezes. Abx stopped on 4/5, completed Tamiflu.  overnight. Chronic anemia, Hgb at 7.6. Plan for HD today, fistula to L upper arm intact. Up SBA. Sleeping in between cares, will cont with POC.

## 2017-04-06 NOTE — PROGRESS NOTES
[facility]  Hospitalist Progress Note  Marquez Tolliver,  04/06/2017    Reason for Stay (Diagnosis): Influenza, Pneumonia         Assessment and Plan:      Summary of Stay: Donald Raza is a 67 year old male with a history of DM2, HIV, TIA, ESRD on HD, CAD, HTN admitted on 3/28/2017 with fever, cough, and weakness ultimately dx with influenza and PNA and started on tamiflu and vanco/zosyn, respectively. Has improved from a respiratory standpoint, ID managing antimicrobials. Had what seems to be brief non-cardiac CP, now resolved. Heme also consulted for recurrent anemia and TCP for which he has 4 units during hospital stay. Likely nearing discharge.      Problem List:   1. Influenza A: Symptoms worsening over past two days.  Restart Tamiflu for prolonged course with 30 mg every other day after dialysis.  2. Pneumonia: ID following. Zosyn stopped 4/5. Vanco stopped 4/3.  Symptoms worse today.  Recheck Procalcitonin.  3. Bronchospasm. Due to recent Influenza and Pneumonia. Start Prednisone 60 mg/day.  Continue Duonebs QID. Albuterol PRN.   4. Deconditioning. PT and OT consults.  5. ESRD on HD qTRS s/p failed transplant: No issues. Renal consulted. Continue Cellcept. Dialysis per Nephrology.  6. Acute on chronic anemia and TCP: No obvious bleeding. Unclear etiology, likely multifactorial. Recieved 2 U PRBC 3/30 and needs irradiated products. Has undergone GI w/u with recent neg EGD. Had 2 more units PRBC's on 4/4 with dialysis. Hgb 7.4 this morning.   7. CAD s/p PCI with stenting (within the past year): Stable, no CP. On DAP. Continue ASA 81, plavix, atorvastatin 40, coreg 12.5 bid, imdur 60 daily.   8. HIV: Appreciate ID input. Not likely active issue. Continue PTA norvir, tivicay, prezista, dapsone.  9. Hx of TIA: Stable, continue atorvastatin and ASA/plavix.  10. HTN: Continue Norvasc, coreg, hydralazine 25 tid, imdur, losartan 100.  11. DM: Increase Lantus to 38 units/day. Continue scheduled  "Novolog and Novolog SSI.          DVT Prophylaxis: Pneumatic Compression Devices  Code Status: Full Code  Discharge Dispo: home  Estimated Disch Date / # of Days until Disch: 2-3        Interval History (Subjective):      Short of breath.  Coughing.  Weak.  No CP, F/C, N/V, or diarrhea.                  Physical Exam:      Last Vital Signs:  /76  Pulse 79  Temp 97.6  F (36.4  C) (Axillary)  Resp 20  Ht 1.803 m (5' 11\")  Wt 92.7 kg (204 lb 6.4 oz)  SpO2 99%  BMI 28.51 kg/m2    Gen:  NAD, A&Ox3.  Eyes:  PERRL, sclera anicteric.  OP:  MMM, no lesions.  Neck:  Supple.  CV:  Regular, no murmurs.  Lung:  Wheeze b/l, normal effort.  Ab:  +BS, soft.  Skin:  Warm, dry to touch.  No rash.  Ext:  1+ pitting edema LE b/l.           Medications:      All current medications were reviewed with changes reflected in problem list.         Data:      All new lab and imaging data was reviewed.   Labs:    Recent Labs  Lab 04/06/17  0800      POTASSIUM 4.5   CHLORIDE 103   CO2 25   ANIONGAP 11   *   BUN 66*   CR 7.26*   GFRESTIMATED 8*   GFRESTBLACK 9*   PRABHA 9.4       Recent Labs  Lab 04/06/17  0709   WBC 5.7   HGB 7.4*   HCT 24.0*   MCV 92   *      Imaging:   No results found for this or any previous visit (from the past 24 hour(s)).    "

## 2017-04-06 NOTE — TELEPHONE ENCOUNTER
"Call received from [facility], NP Ann, at [phone], questioning when patient can come off of the anticoagulation therapy for a prostate biopsy.   Review of last OV 8/15/16 with Dr. Diaz states \" on dual antiplatelet therapy, which he should continue at least until the end of 05/2017\".    Had a Non STEMI 5/2016. HX of PMH--CAD (NSTEMI--12/2015 VANITA Ostial diagonal--6/2015 VANITA mLAD and distal LAD), Mildly Dilated Aortic Root, HTN, HLD, DM, ESRD (hemodialysis), PHTN, TIA, HIV Positive, Unstable Angina, CP       Had repeat Heart cath on 1/4/17    Findings  Left main: No significant narrowing    LAD: Proximal segment mild atheromatous change. Midsegment stented  just after origin of first diagonal branch. This stent has smooth 20%  to 30% in-stent restenosis unchanged from last film. Origin of first  diagonal branch is stented with 20% in-stent restenosis. Distal LAD  stent is widely patent with no restenosis. There is STEVE-3 flow in the  LAD and all branches    Circumflex: Nondominant. Mild generalized atheromatous change with no  significant focal narrowing. STEVE-3 flow    Right coronary: Dominant vessel. Mild generalized atheromatous change  with no significant focal narrowing. STEVE-3 flow.    Assessment all stented sites are widely patent with only mild in-stent  restenosis and brisk STEVE-3 flow in all vessels. There is no  indication for repeat percutaneous intervention.    Recommendations  1. Continue present medical therapy  2. Hemodialysis    Will message Dr. Diaz if any different recommendations are advised.   "

## 2017-04-06 NOTE — PROGRESS NOTES
Lab Results   Component Value Date    POTASSIUM 4.4 04/04/2017     Lab Results   Component Value Date    HGB 7.6 04/05/2017          Hemodialysis Note:      All machine safety checks completed and passed.  Total chlorine checks less than 0.1ppm   All connections secured, saline line double clamped.  Venous and arterial parameters set  Report rec'd from Mary Jo Severance, RN    Rec'd pt per w/c acc'd by transport team. Consent obtained for dialysis Rx this hospitalization.  Good circulation to fistula arm. Time out taken.    LAF cannulated with 15 Ga needles with no difficulty. Tourniquet used.     Pt ran 3.5 hours on a K 3 bath, with 2.3L removed. Blood flow rate of 400 ml/min was obtained.   PRE-WEIGHT:92.7kgs,    TARGET WEIGHT 83.5kgs,     POST-WT 90.4kgs.      Complications: None.   Education regarding ESRD given to patient with good understanding.     Vascular Access: Aseptic prep done for both on/off  Total Heparin given: 0 units    Meds given: Epogen.      Dr. Reed visited pt during run.   Transducers checked Q 15 minutes, remain clear throughout Rx.  Machine water alarms in place and functioning.  Total blood volume processed:81L  Hemostasis of needle sites achieved after 15 minutes. Patient dialyzes at [place] Q T-Th-Sat.  POST ASSESSMENTS: Skin warm and dry, alert, denies discomfort, Lungs course, Resp rate regular, apical rate regular. No edema.  See flowsheet in EPIC for further details.  Report given to Mary Jo Severance, RN.   [facility]

## 2017-04-06 NOTE — PROGRESS NOTES
Infection Prevention:    Droplet precautions discontinued, standard precautions sufficient. Please contact Infection Prevention with any questions/concerns at *[phone].    Margaret De Jesus, ICP

## 2017-04-06 NOTE — PLAN OF CARE
Problem: Goal Outcome Summary  Goal: Goal Outcome Summary  OT:  eval order received and chart reviewed. Pt is a 67 year old male with a history of DM2, HIV, TIA, ESRD on HD, CAD, HTN admitted on 3/28/2017 with fever, cough, and weakness ultimately dx with influenza and PNA.  OT attempted session as scheduled this morning, but Pt at dialysis.  Plan to check back as schedule allows with full report and recommendations to follow.

## 2017-04-06 NOTE — PROGRESS NOTES
[facility]  Infectious Disease Progress Note          Assessment and Plan:   IMPRESSION:   1. A 67-year-old male, complicated medical history, admitted with acute respiratory symptoms found to be influenza A positive, almost certainly that is the primary diagnosis, some infiltrate, certainly at risk for secondary pneumonia, being treated as such.   2. History of human immunodeficiency virus infection, without major opportunistic infections controlled disease, most recent T cells somewhat low at 184, but not likely to have an opportunistic infection.   3. Prior hospitalization in January with chest pain and a history of coronary disease.   4. Nosocomial pneumonia as a complication of the above hospitalization including intubation in January, respiratory status stable since.   5. Diabetes mellitus.   6. End-stage renal disease on chronic dialysis.   7. Sulfa allergy.   8. History of nephrolithiasis.   9 Anemia      RECOMMENDATIONS:   1. Completed influenza tx with Tamiflu at renal adjusted dose.   2.  Watching off antimicrobials  3. Follow clinically, probably not prolonged antibiotics needed here depending on clinical response; hold on a bigger workup for now. T down O2 OK,  4. Continue HIV meds as previously.          Interval History:   no new complaints and doing Ok resolved chest pain, O2 Ok on RA, no + cxs LGF resolved              Medications:       insulin glargine  38 Units Subcutaneous QAM     predniSONE  60 mg Oral Daily     oseltamivir  30 mg Oral Q48H     ipratropium - albuterol 0.5 mg/2.5 mg/3 mL  3 mL Nebulization 4x daily     insulin aspart  6 Units Subcutaneous TID AC     amLODIPine  10 mg Oral At Bedtime     - MEDICATION INSTRUCTIONS for Dialysis Patients -   Does not apply See Admin Instructions     omeprazole  40 mg Oral Daily     magnesium oxide (MAG-OX) tablet 400 mg  400 mg Oral At Bedtime     losartan  100 mg Oral At Bedtime     hydrALAZINE  25 mg Oral TID     gabapentin  " 600 mg Oral QPM     B complex-C-folic acid  1 capsule Oral QPM     insulin aspart  1-10 Units Subcutaneous TID AC     insulin aspart  1-7 Units Subcutaneous At Bedtime     mycophenolate  500 mg Oral BID     aspirin chewable tablet 81 mg  81 mg Oral QPM     atorvastatin  40 mg Oral At Bedtime     calcium acetate  2,001 mg Oral TID w/meals     carvedilol  12.5 mg Oral BID w/meals     clopidogrel (PLAVIX) tablet 75 mg  75 mg Oral QPM     dapsone  100 mg Oral At Bedtime     dolutegravir  50 mg Oral At Bedtime     darunavir  800 mg Oral At Bedtime     gabapentin  300 mg Oral QAM     isosorbide mononitrate  60 mg Oral QPM     ritonavir  100 mg Oral At Bedtime     abacavir  600 mg Oral QPM                  Physical Exam:   Blood pressure 165/67, pulse 82, temperature 97.9  F (36.6  C), temperature source Axillary, resp. rate 20, height 1.803 m (5' 11\"), weight 92.7 kg (204 lb 6.4 oz), SpO2 96 %.  Wt Readings from Last 2 Encounters:   04/06/17 92.7 kg (204 lb 6.4 oz)   01/29/17 79.9 kg (176 lb 3.2 oz)     Vital Signs with Ranges  Temp:  [95.9  F (35.5  C)-98.6  F (37  C)] 97.9  F (36.6  C)  Pulse:  [77-88] 82  Heart Rate:  [77-84] 84  Resp:  [18-20] 20  BP: (116-183)/(67-91) 165/67  SpO2:  [93 %-100 %] 96 %    Constitutional: Awake, alert, cooperative, no apparent distress   Lungs: Congestion to auscultation bilaterally, few crackles some wheezing   Cardiovascular: Regular rate and rhythm, normal S1 and S2, and no murmur noted   Abdomen: Normal bowel sounds, soft, non-distended, non-tender   Skin: No rashes, no cyanosis, no edema   Other:           Data:   All microbiology laboratory data reviewed.  Recent Labs   Lab Test  04/06/17   0709  04/05/17   0623  04/04/17   1215  04/04/17   0554   WBC  5.7  5.2   --   4.9   HGB  7.4*  7.6*  8.0*  6.7*   HCT  24.0*  23.8*   --   20.8*   MCV  92  92   --   91   PLT  111*  106*   --   118*     Recent Labs   Lab Test  04/06/17   0800  04/04/17   0554  04/03/17   0625   CR  7.26*  " 7.55*  6.42*     No lab results found.  Recent Labs   Lab Test  03/30/17   0800  03/29/17   0131  03/28/17   2044  03/28/17 2015 03/28/17   1953  01/07/17   1150  01/05/17 2012 01/05/17 2000 05/19/16   1730   CULT  Light growth Normal subhash  Canceled, Test credited >10 Squamous epithelial cells/low power field indicates   oral contamination. Please recollect. Notification of test cancellation was   given to Jennifer Pacheco RN [id number] 03.29.17 CF  *  10,000 to 50,000 colonies/mL mixed urogenital subhash  Susceptibility testing not routinely done    No growth  No growth  No growth  Incorrectly ordered by PCU/Clinic Canceled, Test credited ordered sputum culture   instead.    No growth  No growth  No growth

## 2017-04-07 ENCOUNTER — TELEPHONE (OUTPATIENT)
Dept: TRANSPLANT | Facility: CLINIC | Age: 67
End: 2017-04-07

## 2017-04-07 ENCOUNTER — APPOINTMENT (OUTPATIENT)
Dept: OCCUPATIONAL THERAPY | Facility: CLINIC | Age: 67
DRG: 193 | End: 2017-04-07
Payer: MEDICARE

## 2017-04-07 ENCOUNTER — APPOINTMENT (OUTPATIENT)
Dept: GENERAL RADIOLOGY | Facility: CLINIC | Age: 67
DRG: 193 | End: 2017-04-07
Attending: INTERNAL MEDICINE
Payer: MEDICARE

## 2017-04-07 ENCOUNTER — APPOINTMENT (OUTPATIENT)
Dept: PHYSICAL THERAPY | Facility: CLINIC | Age: 67
DRG: 193 | End: 2017-04-07
Attending: INTERNAL MEDICINE
Payer: MEDICARE

## 2017-04-07 ENCOUNTER — DOCUMENTATION ONLY (OUTPATIENT)
Dept: TRANSPLANT | Facility: CLINIC | Age: 67
End: 2017-04-07

## 2017-04-07 DIAGNOSIS — N18.6 END STAGE RENAL DISEASE (H): Primary | ICD-10-CM

## 2017-04-07 DIAGNOSIS — Z76.82 ORGAN TRANSPLANT CANDIDATE: Primary | ICD-10-CM

## 2017-04-07 LAB
GLUCOSE BLDC GLUCOMTR-MCNC: 201 MG/DL (ref 70–99)
GLUCOSE BLDC GLUCOMTR-MCNC: 219 MG/DL (ref 70–99)
GLUCOSE BLDC GLUCOMTR-MCNC: 292 MG/DL (ref 70–99)
GLUCOSE BLDC GLUCOMTR-MCNC: 431 MG/DL (ref 70–99)
GLUCOSE BLDC GLUCOMTR-MCNC: 443 MG/DL (ref 70–99)
GLUCOSE BLDC GLUCOMTR-MCNC: 475 MG/DL (ref 70–99)
GLUCOSE BLDC GLUCOMTR-MCNC: 492 MG/DL (ref 70–99)

## 2017-04-07 PROCEDURE — 25000125 ZZHC RX 250: Performed by: INTERNAL MEDICINE

## 2017-04-07 PROCEDURE — A9270 NON-COVERED ITEM OR SERVICE: HCPCS | Mod: GY | Performed by: INTERNAL MEDICINE

## 2017-04-07 PROCEDURE — 97161 PT EVAL LOW COMPLEX 20 MIN: CPT | Mod: GP

## 2017-04-07 PROCEDURE — 94640 AIRWAY INHALATION TREATMENT: CPT

## 2017-04-07 PROCEDURE — 97535 SELF CARE MNGMENT TRAINING: CPT | Mod: GO | Performed by: REHABILITATION PRACTITIONER

## 2017-04-07 PROCEDURE — 00000146 ZZHCL STATISTIC GLUCOSE BY METER IP

## 2017-04-07 PROCEDURE — 97165 OT EVAL LOW COMPLEX 30 MIN: CPT | Mod: GO | Performed by: REHABILITATION PRACTITIONER

## 2017-04-07 PROCEDURE — 25000131 ZZH RX MED GY IP 250 OP 636 PS 637: Mod: GY

## 2017-04-07 PROCEDURE — 25000131 ZZH RX MED GY IP 250 OP 636 PS 637: Mod: GY | Performed by: INTERNAL MEDICINE

## 2017-04-07 PROCEDURE — 99233 SBSQ HOSP IP/OBS HIGH 50: CPT | Performed by: INTERNAL MEDICINE

## 2017-04-07 PROCEDURE — 40000275 ZZH STATISTIC RCP TIME EA 10 MIN

## 2017-04-07 PROCEDURE — 94640 AIRWAY INHALATION TREATMENT: CPT | Mod: 76

## 2017-04-07 PROCEDURE — 25000132 ZZH RX MED GY IP 250 OP 250 PS 637: Mod: GY | Performed by: INTERNAL MEDICINE

## 2017-04-07 PROCEDURE — 40000133 ZZH STATISTIC OT WARD VISIT: Performed by: REHABILITATION PRACTITIONER

## 2017-04-07 PROCEDURE — 12000007 ZZH R&B INTERMEDIATE

## 2017-04-07 PROCEDURE — 40000193 ZZH STATISTIC PT WARD VISIT

## 2017-04-07 PROCEDURE — A9270 NON-COVERED ITEM OR SERVICE: HCPCS | Mod: GY | Performed by: HOSPITALIST

## 2017-04-07 PROCEDURE — 71020 XR CHEST 2 VW: CPT

## 2017-04-07 PROCEDURE — 25000132 ZZH RX MED GY IP 250 OP 250 PS 637: Mod: GY | Performed by: HOSPITALIST

## 2017-04-07 RX ADMIN — DOLUTEGRAVIR SODIUM 50 MG: 50 TABLET, FILM COATED ORAL at 21:39

## 2017-04-07 RX ADMIN — IPRATROPIUM BROMIDE AND ALBUTEROL SULFATE 3 ML: .5; 3 SOLUTION RESPIRATORY (INHALATION) at 21:02

## 2017-04-07 RX ADMIN — CALCIUM ACETATE 2001 MG: 667 CAPSULE ORAL at 12:20

## 2017-04-07 RX ADMIN — Medication 1 CAPSULE: at 19:52

## 2017-04-07 RX ADMIN — GABAPENTIN 300 MG: 300 CAPSULE ORAL at 09:03

## 2017-04-07 RX ADMIN — INSULIN GLARGINE 38 UNITS: 100 INJECTION, SOLUTION SUBCUTANEOUS at 08:59

## 2017-04-07 RX ADMIN — IPRATROPIUM BROMIDE AND ALBUTEROL SULFATE 3 ML: .5; 3 SOLUTION RESPIRATORY (INHALATION) at 15:47

## 2017-04-07 RX ADMIN — ABACAVIR 600 MG: 300 TABLET, FILM COATED ORAL at 19:51

## 2017-04-07 RX ADMIN — OMEPRAZOLE 40 MG: 20 CAPSULE, DELAYED RELEASE ORAL at 09:02

## 2017-04-07 RX ADMIN — ISOSORBIDE MONONITRATE 60 MG: 30 TABLET, EXTENDED RELEASE ORAL at 19:52

## 2017-04-07 RX ADMIN — HYDRALAZINE HYDROCHLORIDE 25 MG: 25 TABLET ORAL at 16:20

## 2017-04-07 RX ADMIN — MAGNESIUM OXIDE TAB 400 MG (241.3 MG ELEMENTAL MG) 400 MG: 400 (241.3 MG) TAB at 22:34

## 2017-04-07 RX ADMIN — HYDRALAZINE HYDROCHLORIDE 25 MG: 25 TABLET ORAL at 21:38

## 2017-04-07 RX ADMIN — MYCOPHENOLATE MOFETIL 500 MG: 250 CAPSULE ORAL at 21:38

## 2017-04-07 RX ADMIN — AMLODIPINE BESYLATE 10 MG: 10 TABLET ORAL at 21:39

## 2017-04-07 RX ADMIN — BENZONATATE 100 MG: 100 CAPSULE ORAL at 20:01

## 2017-04-07 RX ADMIN — CARVEDILOL 12.5 MG: 12.5 TABLET, FILM COATED ORAL at 18:03

## 2017-04-07 RX ADMIN — CALCIUM ACETATE 2001 MG: 667 CAPSULE ORAL at 18:03

## 2017-04-07 RX ADMIN — CLOPIDOGREL 75 MG: 75 TABLET, FILM COATED ORAL at 19:52

## 2017-04-07 RX ADMIN — DARUNAVIR 800 MG: 800 TABLET, FILM COATED ORAL at 21:39

## 2017-04-07 RX ADMIN — GABAPENTIN 600 MG: 300 CAPSULE ORAL at 19:51

## 2017-04-07 RX ADMIN — RITONAVIR 100 MG: 100 TABLET, FILM COATED ORAL at 21:38

## 2017-04-07 RX ADMIN — INSULIN ASPART 9 UNITS: 100 INJECTION, SOLUTION INTRAVENOUS; SUBCUTANEOUS at 18:04

## 2017-04-07 RX ADMIN — INSULIN GLARGINE 15 UNITS: 100 INJECTION, SOLUTION SUBCUTANEOUS at 21:37

## 2017-04-07 RX ADMIN — DAPSONE 100 MG: 25 TABLET ORAL at 21:38

## 2017-04-07 RX ADMIN — ATORVASTATIN CALCIUM 40 MG: 40 TABLET, FILM COATED ORAL at 21:39

## 2017-04-07 RX ADMIN — MYCOPHENOLATE MOFETIL 500 MG: 250 CAPSULE ORAL at 09:02

## 2017-04-07 RX ADMIN — INSULIN ASPART 14 UNITS: 100 INJECTION, SOLUTION INTRAVENOUS; SUBCUTANEOUS at 20:24

## 2017-04-07 RX ADMIN — PREDNISONE 60 MG: 10 TABLET ORAL at 09:01

## 2017-04-07 RX ADMIN — ASPIRIN 81 MG CHEWABLE TABLET 81 MG: 81 TABLET CHEWABLE at 19:52

## 2017-04-07 RX ADMIN — LOSARTAN POTASSIUM 100 MG: 100 TABLET, FILM COATED ORAL at 21:38

## 2017-04-07 RX ADMIN — IPRATROPIUM BROMIDE AND ALBUTEROL SULFATE 3 ML: .5; 3 SOLUTION RESPIRATORY (INHALATION) at 11:56

## 2017-04-07 RX ADMIN — IPRATROPIUM BROMIDE AND ALBUTEROL SULFATE 3 ML: .5; 3 SOLUTION RESPIRATORY (INHALATION) at 08:05

## 2017-04-07 RX ADMIN — HYDRALAZINE HYDROCHLORIDE 25 MG: 25 TABLET ORAL at 09:01

## 2017-04-07 RX ADMIN — CARVEDILOL 12.5 MG: 12.5 TABLET, FILM COATED ORAL at 09:02

## 2017-04-07 RX ADMIN — CALCIUM ACETATE 2001 MG: 667 CAPSULE ORAL at 09:00

## 2017-04-07 NOTE — PLAN OF CARE
Problem: Goal Outcome Summary  Goal: Goal Outcome Summary  OT:  eval complete and treatment initiated.  Pt is a 67 year old male with a history of DM2, HIV, TIA, ESRD on HD, CAD, HTN admitted on 3/28/2017 with fever, cough, and weakness ultimately dx with influenza and PNA.  He reported to be independent in ADLs, IADLs and mobility using SEC and living in multi-level home with assist of Wife and Daughter.  On this date pt alert, oriented and able to follow commands.  Supine>sit completed with SBA.  Sit<>stand and functional mobility during ADLs with SBA-CGA.  Pt with complaints of fatigue and SOB, but able to maintain O2 sats at 94% on room air. OT provided education in PLB and EC/WS techniques.  Pt able to verbalize good understanding.  Grooming/hygiene completed with set-up and SBA.  Min A needed for LE dressing to cy slipper socks.  Pt reported to have a sock aide and reacher at home.  Anticipate he will be able to return home with assist of Family.

## 2017-04-07 NOTE — PLAN OF CARE
Problem: Goal Outcome Summary  Goal: Goal Outcome Summary  Outcome: No Change  Alert and oriented x4. Up with standby assist. VSS except blood pressure elevated as high as 172/75, scheduled hydralazine, imdur, norvasc, coreg and cozaar given. Most recent blood pressure is 161/81. Fistula to LUE. Plan is for pt to have diaylsis tomorrow. Chest x-ray performed this shift, see results. See paper chart for telemetry strip. Encouraged incentive spirometry use. Lung sounds diminished with crackles to RLL. Non-productive cough noted. PRN tessalon michel provided. Dyspnea noted on exertion.     1751: Queta MARIE regarding blood sugar of 443. Pt receives a total of 19 units of novolog between scheduled and sliding scale insulin.      2000: Queta MARIE regarding blood sugars of 492. Received new order for one time dose of 14 units of novolog and 15 units of lantus at bedtime.     2238: Queta MARIE regarding blood sugar of 431. Received new order for one time dose of 10 units of novolog in addition to 7 units of sliding scale insulin.

## 2017-04-07 NOTE — PLAN OF CARE
Problem: Goal Outcome Summary  Goal: Goal Outcome Summary  Outcome: No Change  VS: BP high, Temp max 100.2 prn Tylenol given  Neuro: AOX4  Respiratory: LS expiratory wheezing, on RA  Cardiac: SR  GI: BM earlier not observed  : SAGE AVF +ve bruit/thrill, ARMAND  Skin: intact  Labs: Hgb 7.4  B, 317  Plan: Tamiflu QOD after dialysis, Nebs, monitor respiratory status  Pt calls appropriately, sat at edge of bed for meals, up with SBA, had dialysis run today, makes needs known.

## 2017-04-07 NOTE — LETTER
4/7/2017      RE: Donald Raza  [id number] [facility] [id number]       No notes on file    Ty Rafa Rodriguez MD

## 2017-04-07 NOTE — PROGRESS NOTES
[facility]  Infectious Disease Progress Note          Assessment and Plan:   IMPRESSION:   1. A 67-year-old male, complicated medical history, admitted with acute respiratory symptoms found to be influenza A positive, almost certainly that is the primary diagnosis, some infiltrate, certainly at risk for secondary pneumonia, being treated as such.   2. History of human immunodeficiency virus infection, without major opportunistic infections controlled disease, most recent T cells somewhat low at 184, but not likely to have an opportunistic infection.   3. Prior hospitalization in January with chest pain and a history of coronary disease.   4. Nosocomial pneumonia as a complication of the above hospitalization including intubation in January, respiratory status stable since.   5. Diabetes mellitus.   6. End-stage renal disease on chronic dialysis.   7. Sulfa allergy.   8. History of nephrolithiasis.   9 Anemia      RECOMMENDATIONS:   1. Completed influenza tx with Tamiflu at renal adjusted dose. Doubt value to any tx now  2.  Watching off antimicrobials, some wheezing and hypozia doubt infection  3. Follow clinically, probably not prolonged antibiotics needed here depending on clinical response; hold on a bigger workup for now. T down O2 a bit low again  4. Continue HIV meds as previously.  5 call if issues this WE            Interval History:   no new complaints and doing Ok resolved chest pain, O2 Ok on low flow no + cxs LGF resolved              Medications:       insulin aspart  9 Units Subcutaneous TID AC     insulin glargine  38 Units Subcutaneous QAM     predniSONE  60 mg Oral Daily     oseltamivir  30 mg Oral Q48H     ipratropium - albuterol 0.5 mg/2.5 mg/3 mL  3 mL Nebulization 4x daily     amLODIPine  10 mg Oral At Bedtime     - MEDICATION INSTRUCTIONS for Dialysis Patients -   Does not apply See Admin Instructions     omeprazole  40 mg Oral Daily     magnesium oxide (MAG-OX) tablet  "400 mg  400 mg Oral At Bedtime     losartan  100 mg Oral At Bedtime     hydrALAZINE  25 mg Oral TID     gabapentin  600 mg Oral QPM     B complex-C-folic acid  1 capsule Oral QPM     insulin aspart  1-10 Units Subcutaneous TID AC     insulin aspart  1-7 Units Subcutaneous At Bedtime     mycophenolate  500 mg Oral BID     aspirin chewable tablet 81 mg  81 mg Oral QPM     atorvastatin  40 mg Oral At Bedtime     calcium acetate  2,001 mg Oral TID w/meals     carvedilol  12.5 mg Oral BID w/meals     clopidogrel (PLAVIX) tablet 75 mg  75 mg Oral QPM     dapsone  100 mg Oral At Bedtime     dolutegravir  50 mg Oral At Bedtime     darunavir  800 mg Oral At Bedtime     gabapentin  300 mg Oral QAM     isosorbide mononitrate  60 mg Oral QPM     ritonavir  100 mg Oral At Bedtime     abacavir  600 mg Oral QPM                  Physical Exam:   Blood pressure 161/81, pulse 82, temperature 97.5  F (36.4  C), temperature source Oral, resp. rate 20, height 1.803 m (5' 11\"), weight 90 kg (198 lb 8 oz), SpO2 96 %.  Wt Readings from Last 2 Encounters:   04/07/17 90 kg (198 lb 8 oz)   01/29/17 79.9 kg (176 lb 3.2 oz)     Vital Signs with Ranges  Temp:  [97.3  F (36.3  C)-100.2  F (37.9  C)] 97.5  F (36.4  C)  Heart Rate:  [77-98] 77  Resp:  [18-22] 20  BP: (151-183)/(66-88) 161/81  SpO2:  [93 %-97 %] 96 %    Constitutional: Awake, alert, cooperative, no apparent distress   Lungs: Congestion to auscultation bilaterally, few crackles some wheezing   Cardiovascular: Regular rate and rhythm, normal S1 and S2, and no murmur noted   Abdomen: Normal bowel sounds, soft, non-distended, non-tender   Skin: No rashes, no cyanosis, no edema   Other:           Data:   All microbiology laboratory data reviewed.  Recent Labs   Lab Test  04/06/17   0709  04/05/17   0623  04/04/17   1215  04/04/17   0554   WBC  5.7  5.2   --   4.9   HGB  7.4*  7.6*  8.0*  6.7*   HCT  24.0*  23.8*   --   20.8*   MCV  92  92   --   91   PLT  111*  106*   --   118* "     Recent Labs   Lab Test  04/06/17   0800  04/04/17   0554  04/03/17   0625   CR  7.26*  7.55*  6.42*     No lab results found.  Recent Labs   Lab Test  03/30/17   0800  03/29/17   0131  03/28/17   2044  03/28/17 2015 03/28/17   1953  01/07/17   1150  01/05/17 2012 01/05/17 2000 05/19/16   1730   CULT  Light growth Normal subhash  Canceled, Test credited >10 Squamous epithelial cells/low power field indicates   oral contamination. Please recollect. Notification of test cancellation was   given to Jennifer Pacheco RN [id number] 03.29.17 CF  *  10,000 to 50,000 colonies/mL mixed urogenital subhash  Susceptibility testing not routinely done    No growth  No growth  No growth  Incorrectly ordered by PCU/Clinic Canceled, Test credited ordered sputum culture   instead.    No growth  No growth  No growth

## 2017-04-07 NOTE — PROGRESS NOTES
Pt is a/o x4. VSS. Tele: SR. Standby assist. Makes needs known. Denies pain. Lung sounds diminished. Questions and concerns addressed.

## 2017-04-07 NOTE — PLAN OF CARE
Lungs remain course with freq non-productive cough.  Maintaining sats  With ambulating arciniega at 94%.  Mild dyspnea at times.  Pale.  Appears tired.  Tele SR

## 2017-04-07 NOTE — PROGRESS NOTES
"   04/07/17 1451   Quick Adds   Type of Visit Initial PT Evaluation   Living Environment   Lives With spouse   Living Arrangements house   Home Accessibility stairs to enter home;stairs within home;bed and bath are not on the first floor   Number of Stairs to Enter Home 1   Number of Stairs Within Home 10   Stair Railings at Home inside, present on right side   Transportation Available car;family or friend will provide   Living Environment Comment Pt reported to be independent in mobility using a SEC at baseline.   Self-Care   Dominant Hand right   Usual Activity Tolerance good   Regular Exercise no   Equipment Currently Used at Home cane, straight;grab bar;bath bench;dressing device  (reacher and sock aide)   Activity/Exercise/Self-Care Comment Patient reports being independent with mobility at baseline.   Functional Level Prior   Ambulation 1-->assistive equipment   Transferring 1-->assistive equipment   Toileting 1-->assistive equipment   Bathing 1-->assistive equipment   Dressing 3-->assistive equipment and person   Eating 0-->independent   Communication 0-->understands/communicates without difficulty   Swallowing 0-->swallows foods/liquids without difficulty   Cognition 0 - no cognition issues reported   Fall history within last six months no   Which of the above functional risks had a recent onset or change? ambulation;transferring   Prior Functional Level Comment Patient reports that he will participate in amb around home and in community, reports that he sleeps and \"suffers\" after dialysis on Tues, Thurs and Sat.    General Information   Patient/Family Goals Statement return to home   Pertinent History of Current Problem (include personal factors and/or comorbidities that impact the POC) Patient with past medical history significant for DM2, HIV, TIA, ESRD on HD, CAD, HTN now admitted for influenza and PNA.     Precautions/Limitations fall precautions   Cognitive Status Examination   Orientation orientation " "to person, place and time   Personal Safety and Judgment intact   Memory intact   Pain Assessment   Patient Currently in Pain No   Integumentary/Edema   Integumentary/Edema no deficits were identifed   Posture    Posture Forward head position;Protracted shoulders   Range of Motion (ROM)   ROM Comment WFL   Strength   Strength Comments moderate strength deficits, WFL   Bed Mobility   Bed Mobility Comments independent   Transfer Skills   Transfer Comments SBA with SEC   Gait   Gait Comments Patient amb 250 feet with SBA with SEC.  Patient negotiated 1 flight of stairs with single handrail (handrail must be on right side, has a 10lb weight restriction on left side secondary to HD indwelling catheter per patient.   Balance   Balance Comments no LOB, presents wtih wide base of support, uses cane for increased stability   Coordination   Coordination no deficits were identified   Muscle Tone   Muscle Tone no deficits were identified   Clinical Impression   Criteria for Skilled Therapeutic Intervention evaluation only   PT Diagnosis decreased strength and activity tolerance   Clinical Presentation Stable/Uncomplicated   Clinical Decision Making (Complexity) Low complexity   Therapy Frequency` (evaluation only)   Predicted Duration of Therapy Intervention (days/wks) one time visit only; patient to MO tomorrow   Anticipated Discharge Disposition Home with Assist   Risk & Benefits of therapy have been explained Yes   Patient, Family & other staff in agreement with plan of care Yes   [facility] [employer] TM \"6 Clicks\"   2016, Trustees of [facility], under license to [employer].  All rights reserved.   6 Clicks Short Forms Basic Mobility Inpatient Short Form   [facility] AM-PAC  \"6 Clicks\" V.2 Basic Mobility Inpatient Short Form   1. Turning from your back to your side while in a flat bed without using bedrails? 4 - None   2. Moving from lying on your back to sitting on the side of a flat bed without using " bedrails? 4 - None   3. Moving to and from a bed to a chair (including a wheelchair)? 4 - None   4. Standing up from a chair using your arms (e.g., wheelchair, or bedside chair)? 4 - None   5. To walk in hospital room? 4 - None   6. Climbing 3-5 steps with a railing? 3 - A Little   Basic Mobility Raw Score (Score out of 24.Lower scores equate to lower levels of function) 23   Total Evaluation Time   Total Evaluation Time (Minutes) 20

## 2017-04-07 NOTE — PROGRESS NOTES
[facility]  Hospitalist Progress Note  Marquez Tolliver,  04/07/2017    Reason for Stay (Diagnosis): Influenza, Pneumonia         Assessment and Plan:      Summary of Stay: Donald Raza is a 67 year old male with a history of DM2, HIV, TIA, ESRD on HD, CAD, HTN admitted on 3/28/2017 with fever, cough, and weakness ultimately dx with influenza and PNA and started on tamiflu and vanco/zosyn, respectively. Has improved from a respiratory standpoint, ID managing antimicrobials. Had what seems to be brief non-cardiac CP, now resolved. Heme also consulted for recurrent anemia and TCP for which he has 4 units during hospital stay.       Problem List:   1. Influenza A: Symptoms worsening recently. Continue Tamiflu for prolonged course with 30 mg every other day after dialysis.  2. Pneumonia: ID following. Zosyn stopped 4/5. Vanco stopped 4/3.  Procalcitonin on 4/6 moderately elevated, but improved from previous.  Hold off on further antibiotics for now.  Recheck CXR today.    3. Bronchospasm. Due to recent Influenza and Pneumonia. Continue Prednisone 60 mg/day. Continue Duonebs QID. Albuterol PRN.   4. Deconditioning. PT and OT consults.  5. ESRD on HD qTRS s/p failed transplant:  Dialysis per Nephrology.  Continue Cellcept.  6. Acute on chronic anemia and TCP: No obvious bleeding. Unclear etiology, likely multifactorial. Recieved 2 U PRBC 3/30 and needs irradiated products. Has undergone GI w/u with recent neg EGD. Had 2 more units PRBC's on 4/4 with dialysis. Hgb has been stable with no obvious ongoing bleeding.   7. CAD s/p PCI with stenting (within the past year): Stable, no CP. On DAP. Continue ASA 81, plavix, atorvastatin 40, coreg 12.5 bid, imdur 60 daily.   8. HIV: Appreciate ID input. Not likely active issue. Continue PTA norvir, tivicay, prezista, dapsone.  9. Hx of TIA: Stable, continue atorvastatin and ASA/plavix.  10. HTN: Continue Norvasc, coreg, hydralazine 25 tid, imdur, losartan  "100.  11. DM:  Increase scheduled Novolog to 9 units/day.  Continue Lantus and Novolog SSI.          DVT Prophylaxis: Pneumatic Compression Devices  Code Status: Full Code  Discharge Dispo: home  Estimated Disch Date / # of Days until Disch: 1-3        Interval History (Subjective):      Coughing, short of breath.  No CP, F/C, N/V, or diarrhea.                  Physical Exam:      Last Vital Signs:  /73 (BP Location: Right arm)  Pulse 82  Temp 97.3  F (36.3  C) (Oral)  Resp 18  Ht 1.803 m (5' 11\")  Wt 90 kg (198 lb 8 oz)  SpO2 96%  BMI 27.69 kg/m2    Gen:  NAD, A&Ox3.  Eyes:  PERRL, sclera anicteric.  OP:  MMM, no lesions.  Neck:  Supple.  CV:  Regular, no murmurs.  Lung:  Wheeze b/l, normal effort.  Ab:  +BS, soft.  Skin:  Warm, dry to touch.  No rash.  Ext:  No pitting edema LE b/l.           Medications:      All current medications were reviewed with changes reflected in problem list.         Data:      All new lab and imaging data was reviewed.   Labs:    Recent Labs  Lab 04/06/17  0800      POTASSIUM 4.5   CHLORIDE 103   CO2 25   ANIONGAP 11   *   BUN 66*   CR 7.26*   GFRESTIMATED 8*   GFRESTBLACK 9*   PRABHA 9.4       Recent Labs  Lab 04/06/17  0709   WBC 5.7   HGB 7.4*   HCT 24.0*   MCV 92   *      Imaging:   No results found for this or any previous visit (from the past 24 hour(s)).    "

## 2017-04-07 NOTE — PROGRESS NOTES
Renal Medicine       No issues with dialysis yesterday  UF 2.3 liter    Plan am dialysis  Dialysis orders entered          Recent Labs  Lab 04/06/17  0800      POTASSIUM 4.5   CHLORIDE 103   CO2 25   ANIONGAP 11   *   BUN 66*   CR 7.26*   GFRESTIMATED 8*   GFRESTBLACK 9*   PRABHA 9.4           JAMAL Fuller    [facility]  [phone]

## 2017-04-07 NOTE — PROGRESS NOTES
04/07/17 0900   Quick Adds   Type of Visit Initial Occupational Therapy Evaluation   Living Environment   Lives With spouse   Living Arrangements house   Home Accessibility stairs to enter home;stairs within home;bed and bath are not on the first floor   Number of Stairs to Enter Home 1   Number of Stairs Within Home 10   Stair Railings at Home inside, present on right side   Transportation Available car;family or friend will provide   Living Environment Comment Pt reported to be independent in mobility using a SEC at baseline.   Self-Care   Dominant Hand right   Usual Activity Tolerance good   Current Activity Tolerance moderate   Regular Exercise no   Equipment Currently Used at Home cane, straight;grab bar;bath bench;dressing device  (reacher and sock aide)   Activity/Exercise/Self-Care Comment Pt reported to be independent in ADLs at baseline.  Sometimes needs assist with socks and shoes.  Daughter and Wife do all cooking, homemaking and driving.   Functional Level Prior   Ambulation 1-->assistive equipment   Transferring 1-->assistive equipment   Toileting 1-->assistive equipment   Bathing 1-->assistive equipment   Dressing 3-->assistive equipment and person   Eating 0-->independent   Communication 0-->understands/communicates without difficulty   Swallowing 0-->swallows foods/liquids without difficulty   Cognition 0 - no cognition issues reported   Fall history within last six months no   Which of the above functional risks had a recent onset or change? ambulation;transferring       Present no   General Information   Onset of Illness/Injury or Date of Surgery - Date 03/28/17   Referring Physician Dr. Marquez Tolliver   Patient/Family Goals Statement Return to home.   Additional Occupational Profile Info/Pertinent History of Current Problem Pt is a 67 year old male with a history of DM2, HIV, TIA, ESRD on HD, CAD, HTN admitted on 3/28/2017 with fever, cough, and weakness ultimately dx  with influenza and PNA    Precautions/Limitations no known precautions/limitations   General Info Comments activity:  up with assist   Cognitive Status Examination   Orientation orientation to person, place and time   Level of Consciousness alert   Able to Follow Commands WNL/WFL   Personal Safety (Cognitive) WNL/WFL   Memory intact   Attention No deficits were identified   Organization/Problem Solving No deficits were identified   Executive Function No deficits were identified   Visual Perception   Visual Perception Wears glasses   Sensory Examination   Sensory Quick Adds Other (describe)  (peripheral neuropathy)   Pain Assessment   Patient Currently in Pain No   Integumentary/Edema   Integumentary/Edema no deficits were identifed   Posture   Posture not impaired   Range of Motion (ROM)   ROM Quick Adds No deficits were identified   ROM Comment B UE AROM WFL   Strength   Manual Muscle Testing Quick Adds Other   Strength Comments B UE strength grosslyh 4-5/5   Hand Strength   Hand Strength Comments WFL   Muscle Tone Assessment   Muscle Tone Quick Adds No deficits were identified   Coordination   Upper Extremity Coordination No deficits were identified   Mobility   Bed Mobility Comments SBA   Transfer Skill: Bed to Chair/Chair to Bed   Level of Bartholomew: Bed to Chair stand-by assist   Transfer Skill: Sit to Stand   Level of Bartholomew: Sit/Stand stand-by assist   Upper Body Dressing   Level of Bartholomew: Dress Upper Body independent   Physical Assist/Nonphysical Assist: Dress Upper Body set-up required   Lower Body Dressing   Level of Bartholomew: Dress Lower Body minimum assist (75% patients effort)   Physical Assist/Nonphysical Assist: Dress Lower Body set-up required   Grooming   Level of Bartholomew: Grooming stand-by assist   Physical Assist/Nonphysical Assist: Grooming set-up required   Eating/Self Feeding   Level of Bartholomew: Eating independent   Physical Assist/Nonphysical Assist: Eating set-up  "required   Activities of Daily Living Analysis   Impairments Contributing to Impaired Activities of Daily Living balance impaired;strength decreased  (endurance decreased)   General Therapy Interventions   Planned Therapy Interventions ADL retraining;transfer training;strengthening   Clinical Impression   Criteria for Skilled Therapeutic Interventions Met yes, treatment indicated   OT Diagnosis decreased ADL performance   Influenced by the following impairments decreased strength, endurance and balance   Assessment of Occupational Performance 1-3 Performance Deficits   Identified Performance Deficits Pt with decreased ability to tolerate ADLs in standing, LE dressing and bathing   Clinical Decision Making (Complexity) Low complexity   Therapy Frequency daily   Predicted Duration of Therapy Intervention (days/wks) 3 days   Anticipated Discharge Disposition Home with Assist   Risks and Benefits of Treatment have been explained. Yes   Patient, Family & other staff in agreement with plan of care Yes   [facility] TM \"6 Clicks\"   2016, Trustees of [facility], under license to [employer].  All rights reserved.   6 Clicks Short Forms Daily Activity Inpatient Short Form   [facility]  \"6 Clicks\" Daily Activity Inpatient Short Form   1. Putting on and taking off regular lower body clothing? 3 - A Little   2. Bathing (including washing, rinsing, drying)? 3 - A Little   3. Toileting, which includes using toilet, bedpan or urinal? 3 - A Little   4. Putting on and taking off regular upper body clothing? 4 - None   5. Taking care of personal grooming such as brushing teeth? 4 - None   6. Eating meals? 4 - None   Daily Activity Raw Score (Score out of 24.Lower scores equate to lower levels of function) 21   Total Evaluation Time   Total Evaluation Time (Minutes) 10     "

## 2017-04-07 NOTE — TELEPHONE ENCOUNTER
Last PCI with VANITA was in 08/2016, I recommend continuing dual antiplatelet therapy till 08/2017.     Thanks  Arnoldo

## 2017-04-07 NOTE — PROGRESS NOTES
Called by Urology (Lay Reynolds) at [facility] re grade 4 prostate mass.  Pt has instent stenosis so cardiology will not allow anticoag holiday.  As such, biopsy of he prostate will probably yield >25% false negative due to small needle they can use due to ongoing anticoagulation.    Other issue is CD4 count persistently low w HIV+ status.  Recent viral pneumonia and age.   I called his primary Neph Dr. Chavez and he felt that pt is not likely a transplant candidate and agreed that we bring him in to clinic to discuss reasons for delisting recommendation.  Dr. Chavez will communicate this direction with the patient so he will have an idea of what discussion will be.      Colleen Howell aware..  Plan Waitlist visit with me in next month or so.

## 2017-04-07 NOTE — PLAN OF CARE
Problem: Goal Outcome Summary  Goal: Goal Outcome Summary     PT: Patient seen by physical therapy for evaluation.  Patient with past medical history significant for DM2, HIV, TIA, ESRD on HD, CAD, HTN now admitted for influenza and PNA.  Patient lives with wife and daughter in split level home.  Patient reports using cane in the community for ambulation; furniture walks within the house.  Patient in bedside chair upon arrival.  Patient is independent with sit to stand transfers with SEC.  Patient amb 250 feet with SBA with SEC.  Patient negotiated 1 flight of stairs with single handrail (handrail must be on right side, has a 10lb weight restriction on left side secondary to HD indwelling catheter per patient.  Patient returned to room; up in bedside chair with family at end of session.  Family reports no concerns at this time.  Recommend DC to home with assist of family.  Anticipate DC to home tomorrow; no further inpatient physical therapy needs.

## 2017-04-07 NOTE — TELEPHONE ENCOUNTER
Call to Donald per request of Dr. Rodriguez. He is agreeable to coming to meet with her, once he is discharged from the hospital. He is also willing to change around his dialysis time. Will have Trudi contact him.

## 2017-04-07 NOTE — PROGRESS NOTES
"SPIRITUAL HEALTH SERVICES  SPIRITUAL ASSESSMENT Progress Note  [facility] Med/Surg 353    PRIMARY FOCUS:     Goals of care    Symptom/pain management    Emotional/spiritual/Muslim distress    Support for coping    ILLNESS CIRCUMSTANCES:   Reviewed documentation. Reflective conversation shared with Donald, his wife and daughter which integrated elements of illness and family narratives.     Context of Serious Illness/Symptom(s) - Donald is in the hospital for pneumonia and influenza.     Persons/Resources Involved - His wife and children are his primary source of support    DISTRESS:     Emotional/Existential/Relational Distress - not discussed    Spiritual/Confucianism Distress - Talked a lot about Muslim traditions especially around Holy Week/Easter because of how many different ways different denominations and cultures celebrate holidays differently.    Social/Cultural/Economic Distress - Not discussed    SPIRIT (Coping):     Anglican/Shania - Sabianist and joked that he's not just Sabianist, he's part of the [employer] [facility] [employer].    Spiritual Practice(s) - not discussed    Emotional/Existential/Relational Connections - not discussed    SENSE-MAKING:    Goals of Care - to be discharged from the hospital tomorrow.    Meaning/Sense-Making - \"Anglican is black and white. Do you believe or do you not believe. I respect all religions and believe we all are working towards the same God.\"    PLAN: Per patient request, I will come see Donald tomorrow around 9am.      Stewart Grace  Chaplain Resident  Pager [phone]    "

## 2017-04-08 LAB
ABO + RH BLD: NORMAL
ABO + RH BLD: NORMAL
ANION GAP SERPL CALCULATED.3IONS-SCNC: 12 MMOL/L (ref 3–14)
BLD GP AB SCN SERPL QL: NORMAL
BLD PROD TYP BPU: NORMAL
BLD UNIT ID BPU: 0
BLD UNIT ID BPU: 0
BLOOD BANK CMNT PATIENT-IMP: NORMAL
BLOOD PRODUCT CODE: NORMAL
BLOOD PRODUCT CODE: NORMAL
BPU ID: NORMAL
BPU ID: NORMAL
BUN SERPL-MCNC: 104 MG/DL (ref 7–30)
CALCIUM SERPL-MCNC: 9.7 MG/DL (ref 8.5–10.1)
CHLORIDE SERPL-SCNC: 98 MMOL/L (ref 94–109)
CO2 SERPL-SCNC: 27 MMOL/L (ref 20–32)
CREAT SERPL-MCNC: 6.89 MG/DL (ref 0.66–1.25)
ERYTHROCYTE [DISTWIDTH] IN BLOOD BY AUTOMATED COUNT: 14.8 % (ref 10–15)
GFR SERPL CREATININE-BSD FRML MDRD: 8 ML/MIN/1.7M2
GLUCOSE BLDC GLUCOMTR-MCNC: 159 MG/DL (ref 70–99)
GLUCOSE BLDC GLUCOMTR-MCNC: 176 MG/DL (ref 70–99)
GLUCOSE BLDC GLUCOMTR-MCNC: 309 MG/DL (ref 70–99)
GLUCOSE BLDC GLUCOMTR-MCNC: 314 MG/DL (ref 70–99)
GLUCOSE BLDC GLUCOMTR-MCNC: 428 MG/DL (ref 70–99)
GLUCOSE BLDC GLUCOMTR-MCNC: 440 MG/DL (ref 70–99)
GLUCOSE SERPL-MCNC: 208 MG/DL (ref 70–99)
HCT VFR BLD AUTO: 17.5 % (ref 40–53)
HGB BLD-MCNC: 5.7 G/DL (ref 13.3–17.7)
MCH RBC QN AUTO: 29.7 PG (ref 26.5–33)
MCHC RBC AUTO-ENTMCNC: 32.6 G/DL (ref 31.5–36.5)
MCV RBC AUTO: 91 FL (ref 78–100)
NUM BPU REQUESTED: 2
PLATELET # BLD AUTO: 123 10E9/L (ref 150–450)
POTASSIUM SERPL-SCNC: 5 MMOL/L (ref 3.4–5.3)
RBC # BLD AUTO: 1.92 10E12/L (ref 4.4–5.9)
SODIUM SERPL-SCNC: 137 MMOL/L (ref 133–144)
SPECIMEN EXP DATE BLD: NORMAL
TRANSFUSION STATUS PATIENT QL: NORMAL
WBC # BLD AUTO: 6.5 10E9/L (ref 4–11)

## 2017-04-08 PROCEDURE — 40000275 ZZH STATISTIC RCP TIME EA 10 MIN

## 2017-04-08 PROCEDURE — 36415 COLL VENOUS BLD VENIPUNCTURE: CPT | Performed by: INTERNAL MEDICINE

## 2017-04-08 PROCEDURE — 63400005 ZZH RX 634: Performed by: INTERNAL MEDICINE

## 2017-04-08 PROCEDURE — 86850 RBC ANTIBODY SCREEN: CPT | Performed by: INTERNAL MEDICINE

## 2017-04-08 PROCEDURE — A9270 NON-COVERED ITEM OR SERVICE: HCPCS | Mod: GY | Performed by: INTERNAL MEDICINE

## 2017-04-08 PROCEDURE — 90937 HEMODIALYSIS REPEATED EVAL: CPT

## 2017-04-08 PROCEDURE — 80048 BASIC METABOLIC PNL TOTAL CA: CPT | Performed by: INTERNAL MEDICINE

## 2017-04-08 PROCEDURE — 25000131 ZZH RX MED GY IP 250 OP 636 PS 637: Mod: GY

## 2017-04-08 PROCEDURE — 94640 AIRWAY INHALATION TREATMENT: CPT | Mod: 76

## 2017-04-08 PROCEDURE — 25000131 ZZH RX MED GY IP 250 OP 636 PS 637: Mod: GY | Performed by: INTERNAL MEDICINE

## 2017-04-08 PROCEDURE — 86900 BLOOD TYPING SEROLOGIC ABO: CPT | Performed by: INTERNAL MEDICINE

## 2017-04-08 PROCEDURE — 85027 COMPLETE CBC AUTOMATED: CPT | Performed by: INTERNAL MEDICINE

## 2017-04-08 PROCEDURE — 99233 SBSQ HOSP IP/OBS HIGH 50: CPT | Performed by: INTERNAL MEDICINE

## 2017-04-08 PROCEDURE — 86901 BLOOD TYPING SEROLOGIC RH(D): CPT | Performed by: INTERNAL MEDICINE

## 2017-04-08 PROCEDURE — 25000132 ZZH RX MED GY IP 250 OP 250 PS 637: Mod: GY | Performed by: INTERNAL MEDICINE

## 2017-04-08 PROCEDURE — 94640 AIRWAY INHALATION TREATMENT: CPT

## 2017-04-08 PROCEDURE — 12000007 ZZH R&B INTERMEDIATE

## 2017-04-08 PROCEDURE — 86923 COMPATIBILITY TEST ELECTRIC: CPT | Performed by: INTERNAL MEDICINE

## 2017-04-08 PROCEDURE — 99207 ZZC APP CREDIT; MD BILLING SHARED VISIT: CPT | Performed by: INTERNAL MEDICINE

## 2017-04-08 PROCEDURE — 40000274 ZZH STATISTIC RCP CONSULT EA 30 MIN

## 2017-04-08 PROCEDURE — P9040 RBC LEUKOREDUCED IRRADIATED: HCPCS | Performed by: INTERNAL MEDICINE

## 2017-04-08 PROCEDURE — 25000125 ZZHC RX 250: Performed by: INTERNAL MEDICINE

## 2017-04-08 PROCEDURE — 00000146 ZZHCL STATISTIC GLUCOSE BY METER IP

## 2017-04-08 PROCEDURE — 25000128 H RX IP 250 OP 636: Performed by: INTERNAL MEDICINE

## 2017-04-08 RX ORDER — PREDNISONE 20 MG/1
40 TABLET ORAL DAILY
Status: DISCONTINUED | OUTPATIENT
Start: 2017-04-09 | End: 2017-04-09

## 2017-04-08 RX ADMIN — INSULIN ASPART 9 UNITS: 100 INJECTION, SOLUTION INTRAVENOUS; SUBCUTANEOUS at 12:26

## 2017-04-08 RX ADMIN — LOSARTAN POTASSIUM 100 MG: 100 TABLET, FILM COATED ORAL at 21:35

## 2017-04-08 RX ADMIN — SODIUM CHLORIDE 250 ML: 9 INJECTION, SOLUTION INTRAVENOUS at 08:36

## 2017-04-08 RX ADMIN — MAGNESIUM OXIDE TAB 400 MG (241.3 MG ELEMENTAL MG) 400 MG: 400 (241.3 MG) TAB at 22:35

## 2017-04-08 RX ADMIN — HYDRALAZINE HYDROCHLORIDE 25 MG: 25 TABLET ORAL at 21:34

## 2017-04-08 RX ADMIN — Medication 1 CAPSULE: at 21:32

## 2017-04-08 RX ADMIN — HYDRALAZINE HYDROCHLORIDE 25 MG: 25 TABLET ORAL at 12:29

## 2017-04-08 RX ADMIN — CALCIUM ACETATE 2001 MG: 667 CAPSULE ORAL at 07:43

## 2017-04-08 RX ADMIN — MYCOPHENOLATE MOFETIL 500 MG: 250 CAPSULE ORAL at 08:10

## 2017-04-08 RX ADMIN — INSULIN GLARGINE 38 UNITS: 100 INJECTION, SOLUTION SUBCUTANEOUS at 08:10

## 2017-04-08 RX ADMIN — OMEPRAZOLE 40 MG: 20 CAPSULE, DELAYED RELEASE ORAL at 08:10

## 2017-04-08 RX ADMIN — INSULIN ASPART 9 UNITS: 100 INJECTION, SOLUTION INTRAVENOUS; SUBCUTANEOUS at 08:09

## 2017-04-08 RX ADMIN — CARVEDILOL 12.5 MG: 12.5 TABLET, FILM COATED ORAL at 12:30

## 2017-04-08 RX ADMIN — ABACAVIR 600 MG: 300 TABLET, FILM COATED ORAL at 21:36

## 2017-04-08 RX ADMIN — CARVEDILOL 12.5 MG: 12.5 TABLET, FILM COATED ORAL at 18:57

## 2017-04-08 RX ADMIN — DARUNAVIR 800 MG: 800 TABLET, FILM COATED ORAL at 21:36

## 2017-04-08 RX ADMIN — RITONAVIR 100 MG: 100 TABLET, FILM COATED ORAL at 21:35

## 2017-04-08 RX ADMIN — IPRATROPIUM BROMIDE AND ALBUTEROL SULFATE 3 ML: .5; 3 SOLUTION RESPIRATORY (INHALATION) at 20:56

## 2017-04-08 RX ADMIN — GABAPENTIN 300 MG: 300 CAPSULE ORAL at 12:30

## 2017-04-08 RX ADMIN — ASPIRIN 81 MG CHEWABLE TABLET 81 MG: 81 TABLET CHEWABLE at 21:32

## 2017-04-08 RX ADMIN — ATORVASTATIN CALCIUM 40 MG: 40 TABLET, FILM COATED ORAL at 21:32

## 2017-04-08 RX ADMIN — INSULIN GLARGINE 15 UNITS: 100 INJECTION, SOLUTION SUBCUTANEOUS at 22:49

## 2017-04-08 RX ADMIN — ISOSORBIDE MONONITRATE 60 MG: 30 TABLET, EXTENDED RELEASE ORAL at 21:35

## 2017-04-08 RX ADMIN — AMLODIPINE BESYLATE 10 MG: 10 TABLET ORAL at 21:35

## 2017-04-08 RX ADMIN — CALCIUM ACETATE 2001 MG: 667 CAPSULE ORAL at 12:29

## 2017-04-08 RX ADMIN — HYDRALAZINE HYDROCHLORIDE 25 MG: 25 TABLET ORAL at 15:56

## 2017-04-08 RX ADMIN — CLOPIDOGREL 75 MG: 75 TABLET, FILM COATED ORAL at 21:34

## 2017-04-08 RX ADMIN — MYCOPHENOLATE MOFETIL 500 MG: 250 CAPSULE ORAL at 21:35

## 2017-04-08 RX ADMIN — EPOETIN ALFA 8000 UNITS: 4000 SOLUTION INTRAVENOUS; SUBCUTANEOUS at 09:54

## 2017-04-08 RX ADMIN — DOLUTEGRAVIR SODIUM 50 MG: 50 TABLET, FILM COATED ORAL at 21:36

## 2017-04-08 RX ADMIN — GABAPENTIN 600 MG: 300 CAPSULE ORAL at 21:32

## 2017-04-08 RX ADMIN — DAPSONE 100 MG: 25 TABLET ORAL at 21:34

## 2017-04-08 RX ADMIN — IPRATROPIUM BROMIDE AND ALBUTEROL SULFATE 3 ML: .5; 3 SOLUTION RESPIRATORY (INHALATION) at 15:42

## 2017-04-08 RX ADMIN — CALCIUM ACETATE 2001 MG: 667 CAPSULE ORAL at 18:56

## 2017-04-08 RX ADMIN — PREDNISONE 60 MG: 10 TABLET ORAL at 08:10

## 2017-04-08 RX ADMIN — OSELTAMIVIR PHOSPHATE 30 MG: 30 CAPSULE ORAL at 12:30

## 2017-04-08 NOTE — PLAN OF CARE
Problem: Goal Outcome Summary  Goal: Goal Outcome Summary  Outcome: No Change  Pt A&Ox4, VSS, LS diminished, BS audible - BM 4/7. 3L of fluid removed and 2 units of blood transfused in dialysis, pt tolerated well. Tele SR. Up with SBA. , 159. LUE fistula - thrill and bruit present. Possible DC 1-3 days.

## 2017-04-08 NOTE — PLAN OF CARE
Problem: Goal Outcome Summary  Goal: Goal Outcome Summary  Outcome: No Change  Pt lethargic at beginning of shift. Oriented x4. Up with standby assist. VSS. Fistula to LUE. See paper chart for telemetry strip. Encouraged incentive spirometry use. Lung sounds diminished. Non-productive cough noted. Dyspnea noted on exertion. Per report, pt had 1 black stool this shift.     Paged MD at 2142: blood sugars 309 and 428 this shift. 7 units of sliding scale insulin provided for bedtime coverage, Received new orders for one time dose of 8 units of novolog and 15 units of lantus at bedtime. Blood sugar to be re-check at 0000.

## 2017-04-08 NOTE — PROGRESS NOTES
Admit pager notified of hemoglobin of 5.7 this morning.  Patient is vitally stable.  He is a dialysis patient and is due for dialysis today per his schedule.  Will hold off on transfusion until his dialysis run.  I did order a stat type and screen as his last was 4 days ago.  (primary rounding MD was also notified via page per RN as it is a few minutes to 7am)

## 2017-04-08 NOTE — PROGRESS NOTES
XCOVER.  Glucose not controlled, added lantus 15 units at bedtime/now, and Novolog 14 units x1 in addition to her SSI and Pre meal.. If glucose remains above 250, will start on Insulin gtt later on tonight. Call MD if next glucose check is above 250.  Needs to recalculate her insuline requirement in AM, noted she is on HD.

## 2017-04-08 NOTE — PLAN OF CARE
Problem: Goal Outcome Summary  Goal: Goal Outcome Summary  Outcome: Improving  [place]: A & O x 4.   VS: VSS    Tele: SR  LS: Diminished through out.   GI: Bowel sounds active. Passing flatus.   : WDL  Pain: Denies pain.   Transfer: Up with SBA  IV:  PIV SL.  Fistula to Left LE - WDL  Diet:  Renal diet.   Plan: TBD. Pt will have dialysis today.  Pt blood sugar overnight 314.

## 2017-04-08 NOTE — PROVIDER NOTIFICATION
"   04/08/17 0600   Significant Event   Significant Event Other (see comments)  (hgb 5.7)     MD and bedside RN notified of critical hgb.  \"FYI: LB hgb this AM is 5.7\"  Flako Tracy RN 04/08/17 6:55 AM    "

## 2017-04-08 NOTE — PROGRESS NOTES
"SPIRITUAL HEALTH SERVICES Progress Note  [facility] Med/Surg 353    Visited Donald per follow up. He stated that he was pretty drained after dialysis and would probably take a long nap after he finished his lunch. Shared reflective conversation about Donald' medical narrative. Donald stated that the medical staff didn't know what exactly was going on and didn't feel like he should complain. \"You get what God gives you and if you like it, great. If not, you get it anyway. Also stated that things all happen for a reason and that God never gives him more than he can handle, so why complain? [facility] will continue to be available per patient/family/staff request.    Stewart Grace  Chaplain Resident  Pager [phone]    "

## 2017-04-08 NOTE — PROGRESS NOTES
[facility]  Hospitalist Progress Note  Marquez Tolliver,  04/08/2017    Reason for Stay (Diagnosis): Influenza         Assessment and Plan:      Summary of Stay: Donald Raza is a 67 year old male with a history of DM2, HIV, TIA, ESRD on HD, CAD, HTN admitted on 3/28/2017 with fever, cough, and weakness ultimately dx with influenza and PNA and started on tamiflu and vanco/zosyn, respectively. Has improved from a respiratory standpoint, ID managing antimicrobials. Had what seems to be brief non-cardiac CP, now resolved. Heme also consulted for recurrent anemia and TCP for which he has 4 units during hospital stay.       Problem List:   1. Influenza A: Symptoms worsening recently. Has now had prolonged course of Tamiflu with last dose given today.  2. Pneumonia: ID following. Zosyn stopped 4/5. Vanco stopped 4/3. Procalcitonin on 4/6 moderately elevated, but improved from previous. CXR from 4/7 with findings unchanged from previous.  Hold off on further antibiotics for now.   3. Bronchospasm. Due to recent Influenza and Pneumonia. Decrease Prednisone to 40 mg/day. Continue Duonebs QID. Albuterol PRN.   4. Deconditioning. PT and OT consults.  5. ESRD on HD qTRS s/p failed transplant: Dialysis per Nephrology. Continue Cellcept.  6. Acute on chronic anemia and TCP: No obvious bleeding. Unclear etiology, likely multifactorial. Recieved 2 U PRBC 3/30 and needs irradiated products. Has undergone GI w/u with recent neg EGD. Had 2 more units PRBC's on 4/4 with dialysis. Hgb today is 5.7.  Give two units PRBC's with dialysis.  With continued need for transfusions recently, will stop ASA.    7. CAD s/p PCI with stenting (within the past year): Stable, no CP. On DAP. Continue plavix, atorvastatin 40, coreg 12.5 bid, imdur 60 daily.  Stop ASA due to continued need for transfusions.  8. HIV: Appreciate ID input. Not likely active issue. Continue PTA norvir, tivicay, prezista, dapsone.  9. Hx of TIA: Stable,  "continue atorvastatin and plavix.  10. HTN: Continue Norvasc, coreg, hydralazine 25 tid, imdur, losartan 100.  11. DM: Increase scheduled Novolog to 10 units/day.  Increase Lantus to 40 units/day.  Continue Novolog SSI.  Decrease Prednisone.          DVT Prophylaxis: Pneumatic Compression Devices  Code Status: Full Code  Discharge Dispo: home  Estimated Disch Date / # of Days until Disch: 1-3        Interval History (Subjective):      Short of breath at times.  Coughing.  No CP, F/C, N/V, or diarrhea.                  Physical Exam:      Last Vital Signs:  /72  Pulse 82  Temp 97.4  F (36.3  C)  Resp 16  Ht 1.803 m (5' 11\")  Wt 93.1 kg (205 lb 4.8 oz)  SpO2 96%  BMI 28.63 kg/m2    Gen:  NAD, A&Ox3.  Eyes:  PERRL, sclera anicteric.  OP:  MMM, no lesions.  Neck:  Supple.  CV:  Regular, no murmurs.  Lung:  Wheeze b/l, normal effort.  Ab:  +BS, soft.  Skin:  Warm, dry to touch.  No rash.  Ext:  1+ pitting edema LE b/l.           Medications:      All current medications were reviewed with changes reflected in problem list.         Data:      All new lab and imaging data was reviewed.   Labs:    Recent Labs  Lab 04/08/17  0612      POTASSIUM 5.0   CHLORIDE 98   CO2 27   ANIONGAP 12   *   *   CR 6.89*   GFRESTIMATED 8*   GFRESTBLACK 10*   PRABHA 9.7       Recent Labs  Lab 04/08/17  0612   WBC 6.5   HGB 5.7*   HCT 17.5*   MCV 91   *      Imaging:   Recent Results (from the past 24 hour(s))   XR Chest 2 Views    Narrative    XR CHEST 2 VW   4/7/2017 8:53 PM     HISTORY: cough    COMPARISON: Film dated 3/28/2017    FINDINGS: The heart is negative.  There is patchy infiltrate in the  right perihilar region. Patient had similar findings on the previous  film. Left lung is clear.. The pulmonary vasculature is normal.  The  bones and soft tissues are unremarkable.      Impression    IMPRESSION: Patchy right perihilar and peribronchial infiltrate. The  infiltrate is primarily interstitial " although there may be some  alveolar component This would be consistent with a pneumonitis.  Patient had similar findings on 3/28/2017.        MEET MIMS MD

## 2017-04-08 NOTE — PLAN OF CARE
Problem: Goal Outcome Summary  Goal: Goal Outcome Summary  OT: pt. Hgb 5.7 and out of room at dialysis.  Will hold on therapy and re-schedule for tomorrow per POC.

## 2017-04-08 NOTE — PROGRESS NOTES
Patient started on steroids and blood sugars now > 400.  Insulin regimen increased earlier by cross cover and lantus added.  Received 7 U aspart at bed, but blood sugar still high as expected > 400.  Will give additional 10 U aspart now.

## 2017-04-08 NOTE — PROGRESS NOTES
"[facility]     Date:4/8/17  Admission Dx:PNA, Influenza  Pulmonary History Pul. Hypertension  Home Nebulizer/MDI Use: n/a  Home Oxygen:none  Acuity Level (RCAT flow sheet): Level 3/QID  Aerosol Therapy initaited:  DUO QID/Q4 prn      Pulmonary Hygiene initiated:  Deep breath/cough    Volume Expansion initiated:Incentive Armen      Current Oxygen Requirements:none  Current SpO2:94%    Re-evaluation date: 11 April 2017    Patient Education:  Ongoing education    See \"RT Assessments\" flow sheet for patient assessment scoring and Acuity Level Details.               "

## 2017-04-08 NOTE — PROGRESS NOTES
Potassium   Date Value Ref Range Status   04/08/2017 5.0 3.4 - 5.3 mmol/L Final     Lab Results   Component Value Date    HGB 5.7 04/08/2017     Weight: 93.1 kg (205 lb 4.8 oz) (standing scale)  POST WT 89.6kg  DIALYSIS PROCEDURE NOTE    Patient dialyzed for 3.5 hrs. on a 2 K bath with a net fluid removal of  3.5L.  A BFR of 400 ml/min was obtained via a LUAF using 15gauge needles. Dr. Chaves was consulted of pt status during the treatment.  Total heparin received during the treatment: 0 units. Sites held x 15 min then  pressure drsgs applied.  Meds  Given:8,000units IV and 2 units PRBC's Complications: NONE.  Procedure and ESRD teaching done and questions answered. See flowsheet in EPIC for further details and post assessment.  Machine water alarm in place and functioning.  Pt returned via  wheelchair  Vascular Access: Aseptic prep done for both on/off.  Report received from:Chiquita Corrales R.N.  Report given to:Chiquita Corrales R.n.  HEPATITIS B SURFACE ANTIGEN neg DATE 1/18/17 HEPATITIS B SURFACE ANTIBODY Immune DATE 1/18/17  Chlorine/Chloramine water system checked every 4 hours.  Outpatient Dialysis at [facility]

## 2017-04-08 NOTE — PROGRESS NOTES
[facility]     Renal Progress Note       SHORTHAND KEY FOR MY NOTES:  c = with, s = without, p = after, a = before, x = except, asx = asymptomatic, tx = transplant or treatment, sx = symptoms or symptomatic, cx = canceled or culture, rxn = reaction, yday = yesterday, nl = normal, abx = antibiotics, fxn = function, dx = diagnosis, dz = disease, m/h = melena/hematochezia, c/d/l/ha = cramping/dizziness/lightheadedness/headache, d/c = discharge or diarrhea/constipation, f/c/n/v = fevers/chills/nausea/vomiting, cp/sob = chest pain/shortness of breath.         Assessment/Plan:     1.  ESKD.  Pt dialysed s probs.  A.  Next run on Tues.    2.  Anemia.  He rec'd 2 units of blood today and is on EPO. No obv bleeding noted, per pt.    A.  Agree c blood.  B.  Monitor clinically.    3.  Influenza A. Tamiflu course completed.        Interval History:     Pt feels ok today. No f/c/n/v.  Eating ok.  No issues c HD.    Denies any GI or other bleeding sx.            Medications and Allergies:       insulin aspart  9 Units Subcutaneous TID AC     insulin glargine  15 Units Subcutaneous At Bedtime     insulin glargine  38 Units Subcutaneous QAM     predniSONE  60 mg Oral Daily     ipratropium - albuterol 0.5 mg/2.5 mg/3 mL  3 mL Nebulization 4x daily     amLODIPine  10 mg Oral At Bedtime     - MEDICATION INSTRUCTIONS for Dialysis Patients -   Does not apply See Admin Instructions     omeprazole  40 mg Oral Daily     magnesium oxide (MAG-OX) tablet 400 mg  400 mg Oral At Bedtime     losartan  100 mg Oral At Bedtime     hydrALAZINE  25 mg Oral TID     gabapentin  600 mg Oral QPM     B complex-C-folic acid  1 capsule Oral QPM     insulin aspart  1-10 Units Subcutaneous TID AC     insulin aspart  1-7 Units Subcutaneous At Bedtime     mycophenolate  500 mg Oral BID     aspirin chewable tablet 81 mg  81 mg Oral QPM     atorvastatin  40 mg Oral At Bedtime     calcium acetate  2,001 mg Oral TID w/meals     carvedilol  12.5 mg  "Oral BID w/meals     clopidogrel (PLAVIX) tablet 75 mg  75 mg Oral QPM     dapsone  100 mg Oral At Bedtime     dolutegravir  50 mg Oral At Bedtime     darunavir  800 mg Oral At Bedtime     gabapentin  300 mg Oral QAM     isosorbide mononitrate  60 mg Oral QPM     ritonavir  100 mg Oral At Bedtime     abacavir  600 mg Oral QPM      Allergies   Allergen Reactions     Lisinopril      Sulfa Drugs           Physical Exam:     Vitals were reviewed    Heart Rate: 85, Blood pressure 163/72, pulse 82, temperature 97.4  F (36.3  C), resp. rate 16, height 1.803 m (5' 11\"), weight 93.1 kg (205 lb 4.8 oz), SpO2 96 %.  Wt Readings from Last 3 Encounters:   04/08/17 93.1 kg (205 lb 4.8 oz)   01/29/17 79.9 kg (176 lb 3.2 oz)   01/22/17 82.6 kg (182 lb 1.6 oz)     Intake/Output Summary (Last 24 hours) at 04/08/17 1317  Last data filed at 04/08/17 1145   Gross per 24 hour   Intake             1320 ml   Output             3950 ml   Net            -2630 ml     GENERAL APPEARANCE: pleasant, NAD, alert  HEENT:  Eyes/ears/nose/neck grossly normal  RESP: lungs cta b c good efforts, no crackles  CV: RRR, nl S1/S2  ABDOMEN: o/s/nt/nd  EXTREMITIES/SKIN: no significant ble edema  OTHER:  + LUAF c good thrill/bruit         Data:     CBC RESULTS:     Recent Labs  Lab 04/08/17  0612 04/06/17  0709 04/05/17  0623 04/04/17  1215 04/04/17  0554 04/03/17  0625 04/02/17  0630   WBC 6.5 5.7 5.2  --  4.9 3.1* 3.0*   RBC 1.92* 2.60* 2.60*  --  2.28* 2.37* 2.52*   HGB 5.7* 7.4* 7.6* 8.0* 6.7* 7.0* 7.4*   HCT 17.5* 24.0* 23.8*  --  20.8* 22.2* 23.5*   * 111* 106*  --  118* 100* 100*     Basic Metabolic Panel:    Recent Labs  Lab 04/08/17  0612 04/06/17  0800 04/04/17  0554 04/03/17  0625 04/02/17  0630    139 141 142 141   POTASSIUM 5.0 4.5 4.4 4.4 3.9   CHLORIDE 98 103 106 106 104   CO2 27 25 23 25 27   * 66* 71* 48* 32*   CR 6.89* 7.26* 7.55* 6.42* 5.03*   * 156* 119* 155* 189*   PRABHA 9.7 9.4 8.9 8.6 8.5     INRNo lab results " found in last 7 days.   Attestation:   I have reviewed today's relevant vital signs, notes, medications, labs and imaging.    Bipin Chaves MD  [facility] - Nephrology  [phone]

## 2017-04-09 LAB
ANION GAP SERPL CALCULATED.3IONS-SCNC: 12 MMOL/L (ref 3–14)
BILIRUB SERPL-MCNC: 0.3 MG/DL (ref 0.2–1.3)
BUN SERPL-MCNC: 84 MG/DL (ref 7–30)
CALCIUM SERPL-MCNC: 9.4 MG/DL (ref 8.5–10.1)
CHLORIDE SERPL-SCNC: 99 MMOL/L (ref 94–109)
CO2 SERPL-SCNC: 27 MMOL/L (ref 20–32)
CREAT SERPL-MCNC: 5.15 MG/DL (ref 0.66–1.25)
ERYTHROCYTE [DISTWIDTH] IN BLOOD BY AUTOMATED COUNT: 15.3 % (ref 10–15)
GFR SERPL CREATININE-BSD FRML MDRD: 11 ML/MIN/1.7M2
GLUCOSE BLDC GLUCOMTR-MCNC: 246 MG/DL (ref 70–99)
GLUCOSE BLDC GLUCOMTR-MCNC: 281 MG/DL (ref 70–99)
GLUCOSE BLDC GLUCOMTR-MCNC: 305 MG/DL (ref 70–99)
GLUCOSE BLDC GLUCOMTR-MCNC: 361 MG/DL (ref 70–99)
GLUCOSE BLDC GLUCOMTR-MCNC: 366 MG/DL (ref 70–99)
GLUCOSE BLDC GLUCOMTR-MCNC: 368 MG/DL (ref 70–99)
GLUCOSE BLDC GLUCOMTR-MCNC: 379 MG/DL (ref 70–99)
GLUCOSE SERPL-MCNC: 264 MG/DL (ref 70–99)
HCT VFR BLD AUTO: 23.2 % (ref 40–53)
HGB BLD-MCNC: 7.5 G/DL (ref 13.3–17.7)
LDH SERPL L TO P-CCNC: 194 U/L (ref 85–227)
MAGNESIUM SERPL-MCNC: 2.6 MG/DL (ref 1.6–2.3)
MCH RBC QN AUTO: 28.6 PG (ref 26.5–33)
MCHC RBC AUTO-ENTMCNC: 32.3 G/DL (ref 31.5–36.5)
MCV RBC AUTO: 89 FL (ref 78–100)
PLATELET # BLD AUTO: 153 10E9/L (ref 150–450)
POTASSIUM SERPL-SCNC: 4.7 MMOL/L (ref 3.4–5.3)
RBC # BLD AUTO: 2.62 10E12/L (ref 4.4–5.9)
SODIUM SERPL-SCNC: 138 MMOL/L (ref 133–144)
WBC # BLD AUTO: 8.2 10E9/L (ref 4–11)

## 2017-04-09 PROCEDURE — 25000125 ZZHC RX 250: Performed by: INTERNAL MEDICINE

## 2017-04-09 PROCEDURE — 85027 COMPLETE CBC AUTOMATED: CPT | Performed by: INTERNAL MEDICINE

## 2017-04-09 PROCEDURE — 25000132 ZZH RX MED GY IP 250 OP 250 PS 637: Mod: GY | Performed by: INTERNAL MEDICINE

## 2017-04-09 PROCEDURE — A9270 NON-COVERED ITEM OR SERVICE: HCPCS | Mod: GY | Performed by: INTERNAL MEDICINE

## 2017-04-09 PROCEDURE — 83010 ASSAY OF HAPTOGLOBIN QUANT: CPT | Performed by: INTERNAL MEDICINE

## 2017-04-09 PROCEDURE — 94640 AIRWAY INHALATION TREATMENT: CPT | Mod: 76

## 2017-04-09 PROCEDURE — 99233 SBSQ HOSP IP/OBS HIGH 50: CPT | Performed by: INTERNAL MEDICINE

## 2017-04-09 PROCEDURE — 94640 AIRWAY INHALATION TREATMENT: CPT

## 2017-04-09 PROCEDURE — 83615 LACTATE (LD) (LDH) ENZYME: CPT | Performed by: INTERNAL MEDICINE

## 2017-04-09 PROCEDURE — A9270 NON-COVERED ITEM OR SERVICE: HCPCS | Mod: GY | Performed by: HOSPITALIST

## 2017-04-09 PROCEDURE — 99207 ZZC APP CREDIT; MD BILLING SHARED VISIT: CPT | Performed by: INTERNAL MEDICINE

## 2017-04-09 PROCEDURE — 25000131 ZZH RX MED GY IP 250 OP 636 PS 637: Mod: GY

## 2017-04-09 PROCEDURE — 00000146 ZZHCL STATISTIC GLUCOSE BY METER IP

## 2017-04-09 PROCEDURE — 40000275 ZZH STATISTIC RCP TIME EA 10 MIN

## 2017-04-09 PROCEDURE — 82247 BILIRUBIN TOTAL: CPT | Performed by: INTERNAL MEDICINE

## 2017-04-09 PROCEDURE — 36415 COLL VENOUS BLD VENIPUNCTURE: CPT | Performed by: INTERNAL MEDICINE

## 2017-04-09 PROCEDURE — 12000007 ZZH R&B INTERMEDIATE

## 2017-04-09 PROCEDURE — 25000132 ZZH RX MED GY IP 250 OP 250 PS 637: Mod: GY | Performed by: HOSPITALIST

## 2017-04-09 PROCEDURE — 80048 BASIC METABOLIC PNL TOTAL CA: CPT | Performed by: INTERNAL MEDICINE

## 2017-04-09 PROCEDURE — 83735 ASSAY OF MAGNESIUM: CPT | Performed by: INTERNAL MEDICINE

## 2017-04-09 PROCEDURE — 25000131 ZZH RX MED GY IP 250 OP 636 PS 637: Mod: GY | Performed by: INTERNAL MEDICINE

## 2017-04-09 RX ORDER — PREDNISONE 20 MG/1
20 TABLET ORAL DAILY
Status: DISCONTINUED | OUTPATIENT
Start: 2017-04-09 | End: 2017-04-10

## 2017-04-09 RX ORDER — DEXTROSE MONOHYDRATE 25 G/50ML
25-50 INJECTION, SOLUTION INTRAVENOUS
Status: DISCONTINUED | OUTPATIENT
Start: 2017-04-09 | End: 2017-04-11 | Stop reason: HOSPADM

## 2017-04-09 RX ORDER — NICOTINE POLACRILEX 4 MG
15-30 LOZENGE BUCCAL
Status: DISCONTINUED | OUTPATIENT
Start: 2017-04-09 | End: 2017-04-11 | Stop reason: HOSPADM

## 2017-04-09 RX ADMIN — CALCIUM ACETATE 2001 MG: 667 CAPSULE ORAL at 18:06

## 2017-04-09 RX ADMIN — DAPSONE 100 MG: 25 TABLET ORAL at 21:43

## 2017-04-09 RX ADMIN — HYDRALAZINE HYDROCHLORIDE 25 MG: 25 TABLET ORAL at 17:04

## 2017-04-09 RX ADMIN — CLOPIDOGREL 75 MG: 75 TABLET, FILM COATED ORAL at 21:46

## 2017-04-09 RX ADMIN — GABAPENTIN 300 MG: 300 CAPSULE ORAL at 09:20

## 2017-04-09 RX ADMIN — HYDRALAZINE HYDROCHLORIDE 25 MG: 25 TABLET ORAL at 09:21

## 2017-04-09 RX ADMIN — PREDNISONE 20 MG: 20 TABLET ORAL at 09:21

## 2017-04-09 RX ADMIN — LOSARTAN POTASSIUM 100 MG: 100 TABLET, FILM COATED ORAL at 21:45

## 2017-04-09 RX ADMIN — HYDRALAZINE HYDROCHLORIDE 25 MG: 25 TABLET ORAL at 21:45

## 2017-04-09 RX ADMIN — AMLODIPINE BESYLATE 10 MG: 10 TABLET ORAL at 21:44

## 2017-04-09 RX ADMIN — DOLUTEGRAVIR SODIUM 50 MG: 50 TABLET, FILM COATED ORAL at 21:49

## 2017-04-09 RX ADMIN — ATORVASTATIN CALCIUM 40 MG: 40 TABLET, FILM COATED ORAL at 21:47

## 2017-04-09 RX ADMIN — RITONAVIR 100 MG: 100 TABLET, FILM COATED ORAL at 21:47

## 2017-04-09 RX ADMIN — ASPIRIN 81 MG CHEWABLE TABLET 81 MG: 81 TABLET CHEWABLE at 21:42

## 2017-04-09 RX ADMIN — ABACAVIR 600 MG: 300 TABLET, FILM COATED ORAL at 21:47

## 2017-04-09 RX ADMIN — IPRATROPIUM BROMIDE AND ALBUTEROL SULFATE 3 ML: .5; 3 SOLUTION RESPIRATORY (INHALATION) at 15:48

## 2017-04-09 RX ADMIN — DARUNAVIR 800 MG: 800 TABLET, FILM COATED ORAL at 21:43

## 2017-04-09 RX ADMIN — IPRATROPIUM BROMIDE AND ALBUTEROL SULFATE 3 ML: .5; 3 SOLUTION RESPIRATORY (INHALATION) at 21:02

## 2017-04-09 RX ADMIN — INSULIN GLARGINE 40 UNITS: 100 INJECTION, SOLUTION SUBCUTANEOUS at 09:28

## 2017-04-09 RX ADMIN — CALCIUM ACETATE 2001 MG: 667 CAPSULE ORAL at 09:20

## 2017-04-09 RX ADMIN — CALCIUM ACETATE 2001 MG: 667 CAPSULE ORAL at 12:03

## 2017-04-09 RX ADMIN — BENZONATATE 100 MG: 100 CAPSULE ORAL at 21:58

## 2017-04-09 RX ADMIN — ISOSORBIDE MONONITRATE 60 MG: 30 TABLET, EXTENDED RELEASE ORAL at 21:43

## 2017-04-09 RX ADMIN — MAGNESIUM OXIDE TAB 400 MG (241.3 MG ELEMENTAL MG) 400 MG: 400 (241.3 MG) TAB at 22:46

## 2017-04-09 RX ADMIN — CARVEDILOL 12.5 MG: 12.5 TABLET, FILM COATED ORAL at 17:04

## 2017-04-09 RX ADMIN — HUMAN INSULIN 5 UNITS/HR: 100 INJECTION, SOLUTION SUBCUTANEOUS at 22:46

## 2017-04-09 RX ADMIN — Medication 1 CAPSULE: at 21:47

## 2017-04-09 RX ADMIN — INSULIN GLARGINE 15 UNITS: 100 INJECTION, SOLUTION SUBCUTANEOUS at 21:49

## 2017-04-09 RX ADMIN — GABAPENTIN 600 MG: 300 CAPSULE ORAL at 21:46

## 2017-04-09 RX ADMIN — IPRATROPIUM BROMIDE AND ALBUTEROL SULFATE 3 ML: .5; 3 SOLUTION RESPIRATORY (INHALATION) at 08:32

## 2017-04-09 RX ADMIN — CARVEDILOL 12.5 MG: 12.5 TABLET, FILM COATED ORAL at 09:21

## 2017-04-09 RX ADMIN — MYCOPHENOLATE MOFETIL 500 MG: 250 CAPSULE ORAL at 21:44

## 2017-04-09 RX ADMIN — OMEPRAZOLE 40 MG: 20 CAPSULE, DELAYED RELEASE ORAL at 09:21

## 2017-04-09 RX ADMIN — MYCOPHENOLATE MOFETIL 500 MG: 250 CAPSULE ORAL at 09:20

## 2017-04-09 NOTE — PLAN OF CARE
Problem: Goal Outcome Summary  Goal: Goal Outcome Summary  Outcome: No Change  Pt A&Ox4, VSS, LS clear, BS audible. , 281. SBA. Hgb 7.5 recheck in AM. Tele SR. Possible DC tomorrow.

## 2017-04-09 NOTE — PROGRESS NOTES
Chart check only. Chronic anemia from chronic renal disease and medication. Hemoglobin improved after red cell transfusion. Continue with red cell transfusions as needed. Please call if any hematology concerns.

## 2017-04-09 NOTE — PLAN OF CARE
Problem: Goal Outcome Summary  Goal: Goal Outcome Summary  OT: Attempted session, pt resting in bed, able to report that he is feeling much better, has completed all bathroom cares already today including toilet, shower, and sink oral cares; pt able to demonstrate LE dressing by managing socks, SBA; pt already went out in hallway for ambulation x1 this date; only other goal area for OT is UE exercises however pt reported he has his own routine at home with resistive bands, at this time no further OT needs anticipated, if pt remains hospitalized tomorrow will check back and then if continued progress will complete order, thank you

## 2017-04-09 NOTE — PROGRESS NOTES
Paged re persistent hyperglycemia.  Patient already received 7 and 8 units within last 3 hours for BG > 400 and continues to be elevated.  Suspect BG will come down as these are still active, but last night required 31 units to bring blood sugar to 100's by next day.  -additional 10 U aspart now

## 2017-04-09 NOTE — PROGRESS NOTES
[facility]  Hospitalist Progress Note  Nelson Worthy MD 04/09/2017    Reason for Stay (Diagnosis): anemia         Assessment and Plan:      Summary of Stay: Donald Raza is a 67 year old male with a history of DM2, HIV, TIA, ESRD on HD, CAD, HTN admitted on 3/28/2017 with fever, cough, and weakness ultimately dx with influenza and PNA and started on tamiflu and vanco/zosyn, respectively. Has improved from a respiratory standpoint, antibiotics have been stopped.  Heme also consulted for recurrent anemia and TCP for which he has 6 units during hospital stay. No clinical e/o GI bleed      Problem List:   1. Influenza A: completed course of Tamiflu.  2. Pneumonia: ID following. Zosyn stopped 4/5. Vanco stopped 4/3. Procalcitonin on 4/6 moderately elevated, but improved from previous. CXR from 4/7 with findings unchanged from previous. Off antibiotics   3. Bronchospasm. Due to recent Influenza and Pneumonia. Decrease Prednisone to 20 mg/day today. Continue Duonebs QID. Albuterol PRN.   4. Deconditioning. PT and OT consults.  5. ESRD on HD qTRS s/p failed transplant: Dialysis per Nephrology. Continue Cellcept.  6. Acute on chronic anemia: No obvious bleeding. Unclear etiology, likely multifactorial. Recieved total of 6 U PRBC this admit. Has h/o anemia and underwent GI w/u with recent neg EGD. With continued need for transfusions recently, will stop ASA. contiue PPI.  Will check LDH, hapto, and tbili but doubt hemolysis based on peripheral smear results this admit.  Heme input appreciated.  Recheck hgb in AM  7. CAD s/p PCI with stenting (within the past year): Stable, no CP. On DAP. Continue plavix, atorvastatin 40, coreg 12.5 bid, imdur 60 daily. Stop ASA due to continued need for transfusions.  8. HIV: Appreciate ID input. Not active issue. Continue PTA norvir, tivicay, prezista, dapsone.  9. Hx of TIA: Stable, continue atorvastatin and plavix.  10. HTN: Continue Norvasc, coreg, hydralazine 25  "tid, imdur, losartan 100.  11. DM: Increase scheduled Novolog to 15 units/day. continue Lantus 40 units/day and 15 u qhs.  Is normally on Lantus 50 u daily at home. Continue Novolog SSI. Decrease Prednisone.          DVT Prophylaxis: Pneumatic Compression Devices  Code Status: Full Code  Discharge Dispo: home  Estimated Disch Date / # of Days until Disch: 1-2 days        Interval History (Subjective):      No complaints.  Breathing stable                  Physical Exam:      Last Vital Signs:  /68 (BP Location: Right arm)  Pulse 82  Temp 97.2  F (36.2  C) (Oral)  Resp 18  Ht 1.803 m (5' 11\")  Wt 90.8 kg (200 lb 1.6 oz)  SpO2 94%  BMI 27.91 kg/m2      Intake/Output Summary (Last 24 hours) at 04/09/17 1809  Last data filed at 04/09/17 0900   Gross per 24 hour   Intake              480 ml   Output              200 ml   Net              280 ml       Constitutional: Awake, alert, cooperative, no apparent distress   Respiratory: Clear to auscultation bilaterally, no crackles or wheezing   Cardiovascular: Regular rate and rhythm, normal S1 and S2, and no murmur noted   Abdomen: Normal bowel sounds, soft, non-distended, non-tender   Skin: No rashes, no cyanosis, dry to touch   Neuro: Alert and oriented x3, no weakness, numbness, memory loss   Extremities: No edema, normal range of motion   Other(s):        All other systems: Negative          Medications:      All current medications were reviewed with changes reflected in problem list.         Data:      All new lab and imaging data was reviewed.   Labs:    Recent Labs  Lab 04/09/17  0620 04/08/17  0612 04/06/17  0709   WBC 8.2 6.5 5.7   HGB 7.5* 5.7* 7.4*   HCT 23.2* 17.5* 24.0*   MCV 89 91 92    123* 111*      Imaging:   No results found for this or any previous visit (from the past 24 hour(s)).   "

## 2017-04-09 NOTE — PLAN OF CARE
Problem: Goal Outcome Summary  Goal: Goal Outcome Summary  Outcome: Improving  [place]: A & O x 4.   VS:  T:  98.9 HR: 83   BP: 147/64  Resp:18     O2: 96% rm air.  Tele: SR  LS: Diminished bilaterally.  Pt reports infrequent dry cough.   GI: WDL  : Pt on hemodialysis.  Next run scheduled for Tuesday.  Fistula to left arm - WDL.    Pain: denies pain.   Transfer: SBA  IV:  PIV SL WDL  Diet:  Tolerating PO food and fluids well.   Plan:TBD.

## 2017-04-10 LAB
ALBUMIN SERPL-MCNC: 2.8 G/DL (ref 3.4–5)
ANION GAP SERPL CALCULATED.3IONS-SCNC: 12 MMOL/L (ref 3–14)
BUN SERPL-MCNC: 120 MG/DL (ref 7–30)
CALCIUM SERPL-MCNC: 9.3 MG/DL (ref 8.5–10.1)
CHLORIDE SERPL-SCNC: 99 MMOL/L (ref 94–109)
CO2 SERPL-SCNC: 27 MMOL/L (ref 20–32)
CREAT SERPL-MCNC: 6.95 MG/DL (ref 0.66–1.25)
GFR SERPL CREATININE-BSD FRML MDRD: 8 ML/MIN/1.7M2
GLUCOSE BLDC GLUCOMTR-MCNC: 107 MG/DL (ref 70–99)
GLUCOSE BLDC GLUCOMTR-MCNC: 119 MG/DL (ref 70–99)
GLUCOSE BLDC GLUCOMTR-MCNC: 122 MG/DL (ref 70–99)
GLUCOSE BLDC GLUCOMTR-MCNC: 151 MG/DL (ref 70–99)
GLUCOSE BLDC GLUCOMTR-MCNC: 157 MG/DL (ref 70–99)
GLUCOSE BLDC GLUCOMTR-MCNC: 184 MG/DL (ref 70–99)
GLUCOSE BLDC GLUCOMTR-MCNC: 192 MG/DL (ref 70–99)
GLUCOSE BLDC GLUCOMTR-MCNC: 273 MG/DL (ref 70–99)
GLUCOSE BLDC GLUCOMTR-MCNC: 315 MG/DL (ref 70–99)
GLUCOSE BLDC GLUCOMTR-MCNC: 367 MG/DL (ref 70–99)
GLUCOSE BLDC GLUCOMTR-MCNC: 81 MG/DL (ref 70–99)
GLUCOSE BLDC GLUCOMTR-MCNC: 82 MG/DL (ref 70–99)
GLUCOSE BLDC GLUCOMTR-MCNC: 88 MG/DL (ref 70–99)
GLUCOSE BLDC GLUCOMTR-MCNC: 94 MG/DL (ref 70–99)
GLUCOSE BLDC GLUCOMTR-MCNC: 94 MG/DL (ref 70–99)
GLUCOSE BLDC GLUCOMTR-MCNC: 95 MG/DL (ref 70–99)
GLUCOSE SERPL-MCNC: 89 MG/DL (ref 70–99)
HAPTOGLOB SERPL-MCNC: 195 MG/DL (ref 35–175)
HGB BLD-MCNC: 7.4 G/DL (ref 13.3–17.7)
PHOSPHATE SERPL-MCNC: 4.2 MG/DL (ref 2.5–4.5)
POTASSIUM SERPL-SCNC: 4.5 MMOL/L (ref 3.4–5.3)
SODIUM SERPL-SCNC: 138 MMOL/L (ref 133–144)

## 2017-04-10 PROCEDURE — 25000132 ZZH RX MED GY IP 250 OP 250 PS 637: Mod: GY | Performed by: INTERNAL MEDICINE

## 2017-04-10 PROCEDURE — 40000275 ZZH STATISTIC RCP TIME EA 10 MIN

## 2017-04-10 PROCEDURE — 25000125 ZZHC RX 250: Performed by: INTERNAL MEDICINE

## 2017-04-10 PROCEDURE — 25000131 ZZH RX MED GY IP 250 OP 636 PS 637: Mod: GY | Performed by: INTERNAL MEDICINE

## 2017-04-10 PROCEDURE — A9270 NON-COVERED ITEM OR SERVICE: HCPCS | Mod: GY | Performed by: INTERNAL MEDICINE

## 2017-04-10 PROCEDURE — 12000007 ZZH R&B INTERMEDIATE

## 2017-04-10 PROCEDURE — 99233 SBSQ HOSP IP/OBS HIGH 50: CPT | Performed by: INTERNAL MEDICINE

## 2017-04-10 PROCEDURE — 94640 AIRWAY INHALATION TREATMENT: CPT

## 2017-04-10 PROCEDURE — 36415 COLL VENOUS BLD VENIPUNCTURE: CPT | Performed by: INTERNAL MEDICINE

## 2017-04-10 PROCEDURE — 85018 HEMOGLOBIN: CPT | Performed by: INTERNAL MEDICINE

## 2017-04-10 PROCEDURE — 80069 RENAL FUNCTION PANEL: CPT | Performed by: INTERNAL MEDICINE

## 2017-04-10 PROCEDURE — A9270 NON-COVERED ITEM OR SERVICE: HCPCS | Mod: GY | Performed by: HOSPITALIST

## 2017-04-10 PROCEDURE — 00000146 ZZHCL STATISTIC GLUCOSE BY METER IP

## 2017-04-10 PROCEDURE — 93010 ELECTROCARDIOGRAM REPORT: CPT | Performed by: INTERNAL MEDICINE

## 2017-04-10 PROCEDURE — 25000132 ZZH RX MED GY IP 250 OP 250 PS 637: Mod: GY | Performed by: HOSPITALIST

## 2017-04-10 PROCEDURE — 94640 AIRWAY INHALATION TREATMENT: CPT | Mod: 76

## 2017-04-10 PROCEDURE — 25000131 ZZH RX MED GY IP 250 OP 636 PS 637: Mod: GY

## 2017-04-10 PROCEDURE — 99207 ZZC APP CREDIT; MD BILLING SHARED VISIT: CPT | Performed by: INTERNAL MEDICINE

## 2017-04-10 RX ORDER — PREDNISONE 10 MG/1
10 TABLET ORAL DAILY
Status: DISCONTINUED | OUTPATIENT
Start: 2017-04-11 | End: 2017-04-11 | Stop reason: HOSPADM

## 2017-04-10 RX ORDER — DEXTROSE MONOHYDRATE 25 G/50ML
25-50 INJECTION, SOLUTION INTRAVENOUS
Status: DISCONTINUED | OUTPATIENT
Start: 2017-04-10 | End: 2017-04-10

## 2017-04-10 RX ORDER — NICOTINE POLACRILEX 4 MG
15-30 LOZENGE BUCCAL
Status: DISCONTINUED | OUTPATIENT
Start: 2017-04-10 | End: 2017-04-10

## 2017-04-10 RX ADMIN — BENZONATATE 100 MG: 100 CAPSULE ORAL at 12:56

## 2017-04-10 RX ADMIN — CARVEDILOL 12.5 MG: 12.5 TABLET, FILM COATED ORAL at 08:59

## 2017-04-10 RX ADMIN — IPRATROPIUM BROMIDE AND ALBUTEROL SULFATE 3 ML: .5; 3 SOLUTION RESPIRATORY (INHALATION) at 16:03

## 2017-04-10 RX ADMIN — ISOSORBIDE MONONITRATE 60 MG: 30 TABLET, EXTENDED RELEASE ORAL at 20:20

## 2017-04-10 RX ADMIN — ALBUTEROL SULFATE 2.5 MG: 2.5 SOLUTION RESPIRATORY (INHALATION) at 00:14

## 2017-04-10 RX ADMIN — GUAIFENESIN 10 ML: 100 SOLUTION ORAL at 20:17

## 2017-04-10 RX ADMIN — CALCIUM ACETATE 2001 MG: 667 CAPSULE ORAL at 12:51

## 2017-04-10 RX ADMIN — GABAPENTIN 300 MG: 300 CAPSULE ORAL at 08:59

## 2017-04-10 RX ADMIN — HYDRALAZINE HYDROCHLORIDE 25 MG: 25 TABLET ORAL at 08:59

## 2017-04-10 RX ADMIN — DOLUTEGRAVIR SODIUM 50 MG: 50 TABLET, FILM COATED ORAL at 22:28

## 2017-04-10 RX ADMIN — RITONAVIR 100 MG: 100 TABLET, FILM COATED ORAL at 22:28

## 2017-04-10 RX ADMIN — INSULIN GLARGINE 60 UNITS: 100 INJECTION, SOLUTION SUBCUTANEOUS at 09:00

## 2017-04-10 RX ADMIN — IPRATROPIUM BROMIDE AND ALBUTEROL SULFATE 3 ML: .5; 3 SOLUTION RESPIRATORY (INHALATION) at 07:50

## 2017-04-10 RX ADMIN — CLOPIDOGREL 75 MG: 75 TABLET, FILM COATED ORAL at 20:21

## 2017-04-10 RX ADMIN — OMEPRAZOLE 40 MG: 20 CAPSULE, DELAYED RELEASE ORAL at 08:57

## 2017-04-10 RX ADMIN — MYCOPHENOLATE MOFETIL 500 MG: 250 CAPSULE ORAL at 20:20

## 2017-04-10 RX ADMIN — HYDRALAZINE HYDROCHLORIDE 25 MG: 25 TABLET ORAL at 16:35

## 2017-04-10 RX ADMIN — GUAIFENESIN 10 ML: 100 SOLUTION ORAL at 02:04

## 2017-04-10 RX ADMIN — LOSARTAN POTASSIUM 100 MG: 100 TABLET, FILM COATED ORAL at 22:28

## 2017-04-10 RX ADMIN — ABACAVIR 600 MG: 300 TABLET, FILM COATED ORAL at 20:18

## 2017-04-10 RX ADMIN — CARVEDILOL 12.5 MG: 12.5 TABLET, FILM COATED ORAL at 17:30

## 2017-04-10 RX ADMIN — GABAPENTIN 600 MG: 300 CAPSULE ORAL at 20:20

## 2017-04-10 RX ADMIN — HYDRALAZINE HYDROCHLORIDE 25 MG: 25 TABLET ORAL at 22:28

## 2017-04-10 RX ADMIN — CALCIUM ACETATE 2001 MG: 667 CAPSULE ORAL at 17:30

## 2017-04-10 RX ADMIN — DAPSONE 100 MG: 25 TABLET ORAL at 22:28

## 2017-04-10 RX ADMIN — IPRATROPIUM BROMIDE AND ALBUTEROL SULFATE 3 ML: .5; 3 SOLUTION RESPIRATORY (INHALATION) at 12:37

## 2017-04-10 RX ADMIN — HUMAN INSULIN 14 UNITS/HR: 100 INJECTION, SOLUTION SUBCUTANEOUS at 02:09

## 2017-04-10 RX ADMIN — PREDNISONE 20 MG: 20 TABLET ORAL at 08:58

## 2017-04-10 RX ADMIN — ATORVASTATIN CALCIUM 40 MG: 40 TABLET, FILM COATED ORAL at 22:28

## 2017-04-10 RX ADMIN — DARUNAVIR 800 MG: 800 TABLET, FILM COATED ORAL at 22:28

## 2017-04-10 RX ADMIN — IPRATROPIUM BROMIDE AND ALBUTEROL SULFATE 3 ML: .5; 3 SOLUTION RESPIRATORY (INHALATION) at 20:52

## 2017-04-10 RX ADMIN — AMLODIPINE BESYLATE 10 MG: 10 TABLET ORAL at 22:28

## 2017-04-10 RX ADMIN — MYCOPHENOLATE MOFETIL 500 MG: 250 CAPSULE ORAL at 08:58

## 2017-04-10 RX ADMIN — BENZONATATE 100 MG: 100 CAPSULE ORAL at 16:35

## 2017-04-10 RX ADMIN — CALCIUM ACETATE 2001 MG: 667 CAPSULE ORAL at 08:56

## 2017-04-10 RX ADMIN — MAGNESIUM OXIDE TAB 400 MG (241.3 MG ELEMENTAL MG) 400 MG: 400 (241.3 MG) TAB at 22:28

## 2017-04-10 RX ADMIN — Medication 1 CAPSULE: at 20:19

## 2017-04-10 NOTE — PROGRESS NOTES
CLINICAL NUTRITION SERVICES - REASSESSMENT NOTE     Nutrition Prescription    Malnutrition Status:    Patient does not meet the criteria necessary for diagnosing malnutrition    Recommendations already ordered by Registered Dietitian (RD):  Ordered HS snack of 1/2 chicken salad sandwich on regular bread.  Continue with diet as ordered (Regular, High Consistent Carbohydrate and Dialysis) and supplements (Nepro TID)    Future/Additional Recommendations:  None at this time.     EVALUATION OF THE PROGRESS TOWARD GOALS   Diet: Regular, High Consistent Carbohydrate and Dialysis   Oral Nutrition Supplement: Nepro with meals  Intake: Pt states the intakes are good, and he is able to find a variety of foods that he can eat. Does still feel somewhat limited by the protein restriction more than anything, but says he has no complaints. Pt is consuming Nepro oral supplement TID. Pt states that he would really like something at night to help him get his 9pm pills down; writer suggested 1/2 a sandwich and pt endorses interest in trying that.     NEW FINDINGS   -Weight: 93.4 kg; up from 91.8 kg on 4/4  -GI: Last BM 4/8; no bowel regimen in place.  -Renal: Nephrocaps/Triphrocaps; Mg++ replacement in place PRN  -Diet: Pt has been consuming 100% of meals and supplements TID daily.  -BGM: ; Novolog 20 units TID, 1-7 units at bedtime; Lantus 60 units in AM  -Edema: Trace (1+) pitting edema in bilateral ankles  -Dialysis: next scheduled run: 4/11    MALNUTRITION  % Intake: No decreased intake noted  % Weight Loss: None noted  Subcutaneous Fat Loss: None observed  Muscle Loss: None observed  Fluid Accumulation/Edema: Does not meet criteria  Malnutrition Diagnosis: Patient does not meet two of the above criteria necessary for diagnosing malnutrition     Previous Goals   Pt to consume >/=75% of meals TID + 2 supplements/day  Evaluation: Met    Previous Nutrition Diagnosis  No nutrition diagnosis identified at this time  Evaluation: No  change    CURRENT NUTRITION DIAGNOSIS  No nutrition diagnosis identified at this time     INTERVENTIONS  Implementation  Discussed pt on interdisciplinary rounds.  Ordered HS snack as described above in recs.  Continue with diet as ordered (Regular, High Consistent Carbohydrate and Dialysis) and supplements (Nepro TID)    Goals  Pt to consume >/=75% of meals TID + 2 supplements/day    Monitoring/Evaluation  Progress toward goals will be monitored and evaluated per protocol.      -----------------------------  Sofy Pandya  Dietetic Intern  [facility]  pager: [phone]

## 2017-04-10 NOTE — PLAN OF CARE
Problem: Goal Outcome Summary  Goal: Goal Outcome Summary  Outcome: No Change  AO, VSS. Nonproductive cough-PRN robitussin given, LS diminished. Fistula in L arm-Thrill and Bruit present. Insulin drip infusing. Up SBA. Tele SR. 0450 pt converted to junctional rhythm for approx 35 sec the SR, stat EKG ordered, K 4.7, MG 2.6 pt asym. MD notified follow labs and tele.

## 2017-04-10 NOTE — PLAN OF CARE
Problem: Goal Outcome Summary  Goal: Goal Outcome Summary  Outcome: No Change  Alert and oriented x4. Up with standby assist. VSS. Fistula to LUE. Encouraged incentive spirometry use. Lung sounds diminished with rhonchi, encouraged coughing and deep breathing, pt receiving scheduled nebs by respiratory therapy. Non-productive cough noted. Dyspnea noted on exertion. Blood sugar: 305. See paper chart for telemetry strip, earlier today pt had a few episodes of what appeared to be a junctional rhythm versus slow ventricular tachycardia, Cardiology and Dr. Worthy aware, pt asymptomatic. Magnesium: 2.6, Potassium: 4.7.      [place] MARIE at 2154: blood sugars 305 and 368 this shift. 7 units of sliding scale insulin given at bedtime plus 15 units of lantus.   Received new order for insulin drip, initiated insulin drip at 2250 at 5 units/hour, algorithim 1 with blood sugar of 366.

## 2017-04-10 NOTE — TELEPHONE ENCOUNTER
Call with update on Dr. Diaz recommendations of continuing dual antiplatelet therapy till 08/2017 since last VANITA in 08/2016. Message given to RN at [phone], urology clinic at [facility]. Call back number given should they have further concerns or questions.

## 2017-04-10 NOTE — PROVIDER NOTIFICATION
FYI: pt converted to junctional rhythm for apx 30 sec then back SR. EKG SR. K and MG WNL, pt asymp. resting comfortably. thanks

## 2017-04-10 NOTE — PROGRESS NOTES
Paged re patient going into a junctional rhythm for 30 seconds.  Asymptomatic with this.  Back to NSR on EKG.  .  Mg and K level ok previous morning.  Due for labs this morning so will be important to repeat these given he is a HD patient.  I reviewed the telemetry strip.  About 20 second run of likely a wide complex rhythm.  Possible junctional given the rate was in the 60s, but it was quite a wide complex on the telemetry strip.  Was not R on T phenomenon.    -Continue telemetry and f/u on morning labs.

## 2017-04-10 NOTE — PROGRESS NOTES
Planning HD in AM.  Note high BUN.  Suspect he is reabsorbing protein from blood losses somewhere.    Rudolph Wilkes MD

## 2017-04-10 NOTE — PROGRESS NOTES
[facility]  Infectious Disease Progress Note          Assessment and Plan:   IMPRESSION:   1. A 67-year-old male, complicated medical history, admitted with acute respiratory symptoms found to be influenza A positive, almost certainly that is the primary diagnosis, some infiltrate, certainly at risk for secondary pneumonia, being treated as such.   2. History of human immunodeficiency virus infection, without major opportunistic infections controlled disease, most recent T cells somewhat low at 184, but not likely to have an opportunistic infection.   3. Prior hospitalization in January with chest pain and a history of coronary disease.   4. Nosocomial pneumonia as a complication of the above hospitalization including intubation in January, respiratory status stable since.   5. Diabetes mellitus.   6. End-stage renal disease on chronic dialysis.   7. Sulfa allergy.   8. History of nephrolithiasis.   9 Anemia      RECOMMENDATIONS:   1. Completed influenza tx with Tamiflu at renal adjusted dose.   2.  Watching off antimicrobials  3. Follow clinically, probably not prolonged antibiotics needed here depending on clinical response; hold on a bigger workup for now. T down O2 OK, seems stable  4. Continue HIV meds as previously.  5 Disposition Ok with us          Interval History:   no new complaints and doing Ok resolved chest pain, O2 Ok on RA, no + cxs LGF resolved              Medications:       insulin glargine  60 Units Subcutaneous QAM AC     insulin aspart  20 Units Subcutaneous TID w/meals     insulin aspart  1-10 Units Subcutaneous TID AC     insulin aspart  1-7 Units Subcutaneous At Bedtime     [START ON 4/11/2017] predniSONE  10 mg Oral Daily     sodium chloride (PF)  3 mL Intravenous Q8H     ipratropium - albuterol 0.5 mg/2.5 mg/3 mL  3 mL Nebulization 4x daily     amLODIPine  10 mg Oral At Bedtime     - MEDICATION INSTRUCTIONS for Dialysis Patients -   Does not apply See Admin  "Instructions     omeprazole  40 mg Oral Daily     magnesium oxide (MAG-OX) tablet 400 mg  400 mg Oral At Bedtime     losartan  100 mg Oral At Bedtime     hydrALAZINE  25 mg Oral TID     gabapentin  600 mg Oral QPM     B complex-C-folic acid  1 capsule Oral QPM     mycophenolate  500 mg Oral BID     atorvastatin  40 mg Oral At Bedtime     calcium acetate  2,001 mg Oral TID w/meals     carvedilol  12.5 mg Oral BID w/meals     clopidogrel (PLAVIX) tablet 75 mg  75 mg Oral QPM     dapsone  100 mg Oral At Bedtime     dolutegravir  50 mg Oral At Bedtime     darunavir  800 mg Oral At Bedtime     gabapentin  300 mg Oral QAM     isosorbide mononitrate  60 mg Oral QPM     ritonavir  100 mg Oral At Bedtime     abacavir  600 mg Oral QPM                  Physical Exam:   Blood pressure 147/68, pulse 82, temperature 95.3  F (35.2  C), temperature source Oral, resp. rate 16, height 1.803 m (5' 11\"), weight 93.4 kg (206 lb), SpO2 96 %.  Wt Readings from Last 2 Encounters:   04/10/17 93.4 kg (206 lb)   01/29/17 79.9 kg (176 lb 3.2 oz)     Vital Signs with Ranges  Temp:  [95.3  F (35.2  C)-97.9  F (36.6  C)] 95.3  F (35.2  C)  Heart Rate:  [67-85] 67  Resp:  [16-18] 16  BP: (133-152)/(64-68) 147/68  SpO2:  [93 %-96 %] 96 %    Constitutional: Awake, alert, cooperative, no apparent distress   Lungs: Congestion to auscultation bilaterally, few crackles some wheezing   Cardiovascular: Regular rate and rhythm, normal S1 and S2, and no murmur noted   Abdomen: Normal bowel sounds, soft, non-distended, non-tender   Skin: No rashes, no cyanosis, no edema   Other:           Data:   All microbiology laboratory data reviewed.  Recent Labs   Lab Test  04/10/17   0729  04/09/17   0620  04/08/17   0612  04/06/17   0709   WBC   --   8.2  6.5  5.7   HGB  7.4*  7.5*  5.7*  7.4*   HCT   --   23.2*  17.5*  24.0*   MCV   --   89  91  92   PLT   --   153  123*  111*     Recent Labs   Lab Test  04/10/17   0729  04/09/17   0620  04/08/17   0612   CR  6.95*  " 5.15*  6.89*     No lab results found.  Recent Labs   Lab Test  03/30/17   0800  03/29/17   0131  03/28/17   2044  03/28/17 2015 03/28/17   1953  01/07/17   1150  01/05/17 2012 01/05/17 2000 05/19/16   1730   CULT  Light growth Normal subhash  Canceled, Test credited >10 Squamous epithelial cells/low power field indicates   oral contamination. Please recollect. Notification of test cancellation was   given to Jennifer Pacheco RN [id number] 03.29.17 CF  *  10,000 to 50,000 colonies/mL mixed urogenital subhash  Susceptibility testing not routinely done    No growth  No growth  No growth  Incorrectly ordered by PCU/Clinic Canceled, Test credited ordered sputum culture   instead.    No growth  No growth  No growth

## 2017-04-10 NOTE — PLAN OF CARE
Problem: Goal Outcome Summary  Goal: Goal Outcome Summary  Outcome: Improving  Up to chair for meal, denies pain. Tessalon for cough given. Insulin gtt stopped at 0600, Lantus 60 units given and 20 units with each meal, BS . Continue current POC.

## 2017-04-10 NOTE — PLAN OF CARE
Problem: Goal Outcome Summary  Goal: Goal Outcome Summary  OT: Checked back today, at this time pt continues to complete self cares well, SBA including shower, toilet, dressing, will complete OT order as pt has met all goals     Occupational Therapy Discharge Summary     Reason for therapy discharge:    All goals and outcomes met, no further needs identified.     Progress towards therapy goal(s). See goals on Care Plan in [facility] electronic health record for goal details.  Goals met     Therapy recommendation(s):    No further therapy is recommended.

## 2017-04-10 NOTE — PROGRESS NOTES
[facility]  Hospitalist Progress Note  Nelson Worthy MD 04/10/2017    Reason for Stay (Diagnosis): anemia, influenza         Assessment and Plan:      Summary of Stay: Donald Raza is a 67 year old male with a history of DM2, HIV, TIA, ESRD on HD, CAD, HTN admitted on 3/28/2017 with fever, cough, and weakness ultimately dx with influenza and PNA and started on tamiflu and vanco/zosyn, respectively. Has improved from a respiratory standpoint, antibiotics have been stopped. Heme also consulted for recurrent anemia and TCP for which he has 6 units during hospital stay. No clinical e/o GI bleed and no e/o hemolysis based on lab analysis.  Course complicated by uncontrolled DM requiring insulin gtt overnight; insulin doses increased today and prednisone being weaned with subsequent improvement of BS      Problem List:   1. Influenza A: completed course of Tamiflu.  2. Pneumonia: ID was following. Zosyn stopped 4/5. Vanco stopped 4/3. Procalcitonin on 4/6 moderately elevated, but improved from previous. CXR from 4/7 with findings unchanged from previous. Off antibiotics   3. Bronchospasm. Due to recent Influenza and Pneumonia. Decreased Prednisone to 20 mg/day - will further reduce to 10 mg daily tomorrow. Continue Duonebs QID. Albuterol PRN.   4. Deconditioning. PT and OT consults.  5. ESRD on HD qTRS s/p failed transplant: Dialysis per Nephrology - tomorrow. Continue Cellcept.  6. Acute on chronic anemia: No obvious bleeding.  No e/o hemolysis.  Recent EGD done 2/17 showed no pathology. Unclear etiology, likely multifactorial - heme input appreciated. Recieved total of 6 U PRBC this admit. Stop ASA. contiue PPI. Recheck hgb in AM - stable at 7.5 from yesterday  7. CAD s/p PCI with stenting (within the past year): Stable, no CP. On DAP. Continue plavix, atorvastatin 40, coreg 12.5 bid, imdur 60 daily. Stop ASA due to continued need for transfusions.  8. HIV: Appreciate ID input. Not active issue.  "Continue PTA norvir, tivicay, prezista, dapsone.  9. Hx of TIA: Stable, continue atorvastatin and plavix.  10. HTN: Continue Norvasc, coreg, hydralazine 25 tid, imdur, losartan 100.  11. DM: Increased scheduled Novolog to 20 units/day and Lantus to 60u daily Continue Novolog SSI. Decrease Prednisone.          DVT Prophylaxis: Pneumatic Compression Devices  Code Status: Full Code  Discharge Dispo: home  Estimated Disch Date / # of Days until Disch: tomorrow after dialysis        Interval History (Subjective):      No complaints.  No blood in stools                  Physical Exam:      Last Vital Signs:  /68 (BP Location: Left arm)  Pulse 82  Temp 95.3  F (35.2  C) (Oral)  Resp 16  Ht 1.803 m (5' 11\")  Wt 93.4 kg (206 lb)  SpO2 96%  BMI 28.73 kg/m2      Intake/Output Summary (Last 24 hours) at 04/10/17 1716  Last data filed at 04/10/17 1415   Gross per 24 hour   Intake             1153 ml   Output                0 ml   Net             1153 ml       Constitutional: Awake, alert, cooperative, no apparent distress   Respiratory: Clear to auscultation bilaterally, no crackles or wheezing   Cardiovascular: Regular rate and rhythm, normal S1 and S2, and no murmur noted   Abdomen: Normal bowel sounds, soft, non-distended, non-tender   Skin: No rashes, no cyanosis, dry to touch   Neuro: Alert and oriented x3, no weakness, numbness, memory loss   Extremities: No edema, normal range of motion   Other(s):        All other systems: Negative          Medications:      All current medications were reviewed with changes reflected in problem list.         Data:      All new lab and imaging data was reviewed.   Labs:    Recent Labs  Lab 04/10/17  0729 04/09/17  0620 04/08/17  0612 04/06/17  0709   WBC  --  8.2 6.5 5.7   HGB 7.4* 7.5* 5.7* 7.4*   HCT  --  23.2* 17.5* 24.0*   MCV  --  89 91 92   PLT  --  153 123* 111*      Imaging:   No results found for this or any previous visit (from the past 24 hour(s)).   "

## 2017-04-11 VITALS
HEIGHT: 71 IN | DIASTOLIC BLOOD PRESSURE: 62 MMHG | WEIGHT: 211.8 LBS | RESPIRATION RATE: 20 BRPM | SYSTOLIC BLOOD PRESSURE: 146 MMHG | BODY MASS INDEX: 29.65 KG/M2 | OXYGEN SATURATION: 96 % | HEART RATE: 82 BPM | TEMPERATURE: 96.2 F

## 2017-04-11 LAB
ALBUMIN SERPL-MCNC: 2.8 G/DL (ref 3.4–5)
ANION GAP SERPL CALCULATED.3IONS-SCNC: 14 MMOL/L (ref 3–14)
BASOPHILS # BLD AUTO: 0 10E9/L (ref 0–0.2)
BASOPHILS NFR BLD AUTO: 0.3 %
BUN SERPL-MCNC: 139 MG/DL (ref 7–30)
CALCIUM SERPL-MCNC: 9 MG/DL (ref 8.5–10.1)
CHLORIDE SERPL-SCNC: 101 MMOL/L (ref 94–109)
CO2 SERPL-SCNC: 24 MMOL/L (ref 20–32)
CREAT SERPL-MCNC: 8.39 MG/DL (ref 0.66–1.25)
DIFFERENTIAL METHOD BLD: ABNORMAL
EOSINOPHIL # BLD AUTO: 0.1 10E9/L (ref 0–0.7)
EOSINOPHIL NFR BLD AUTO: 0.6 %
ERYTHROCYTE [DISTWIDTH] IN BLOOD BY AUTOMATED COUNT: 15.4 % (ref 10–15)
GFR SERPL CREATININE-BSD FRML MDRD: 6 ML/MIN/1.7M2
GLUCOSE BLDC GLUCOMTR-MCNC: 120 MG/DL (ref 70–99)
GLUCOSE BLDC GLUCOMTR-MCNC: 187 MG/DL (ref 70–99)
GLUCOSE BLDC GLUCOMTR-MCNC: 74 MG/DL (ref 70–99)
GLUCOSE SERPL-MCNC: 145 MG/DL (ref 70–99)
HCT VFR BLD AUTO: 23 % (ref 40–53)
HGB BLD-MCNC: 7.3 G/DL (ref 13.3–17.7)
IMM GRANULOCYTES # BLD: 0.2 10E9/L (ref 0–0.4)
IMM GRANULOCYTES NFR BLD: 2.2 %
LYMPHOCYTES # BLD AUTO: 2 10E9/L (ref 0.8–5.3)
LYMPHOCYTES NFR BLD AUTO: 20.4 %
MCH RBC QN AUTO: 28.6 PG (ref 26.5–33)
MCHC RBC AUTO-ENTMCNC: 31.7 G/DL (ref 31.5–36.5)
MCV RBC AUTO: 90 FL (ref 78–100)
MONOCYTES # BLD AUTO: 0.7 10E9/L (ref 0–1.3)
MONOCYTES NFR BLD AUTO: 7.3 %
NEUTROPHILS # BLD AUTO: 6.9 10E9/L (ref 1.6–8.3)
NEUTROPHILS NFR BLD AUTO: 69.2 %
NRBC # BLD AUTO: 0 10*3/UL
NRBC BLD AUTO-RTO: 0 /100
PHOSPHATE SERPL-MCNC: 4.9 MG/DL (ref 2.5–4.5)
PLATELET # BLD AUTO: 181 10E9/L (ref 150–450)
POTASSIUM SERPL-SCNC: 4.8 MMOL/L (ref 3.4–5.3)
RBC # BLD AUTO: 2.55 10E12/L (ref 4.4–5.9)
SODIUM SERPL-SCNC: 139 MMOL/L (ref 133–144)
WBC # BLD AUTO: 9.9 10E9/L (ref 4–11)

## 2017-04-11 PROCEDURE — 63400005 ZZH RX 634: Performed by: INTERNAL MEDICINE

## 2017-04-11 PROCEDURE — 99239 HOSP IP/OBS DSCHRG MGMT >30: CPT | Performed by: INTERNAL MEDICINE

## 2017-04-11 PROCEDURE — 25000125 ZZHC RX 250: Performed by: INTERNAL MEDICINE

## 2017-04-11 PROCEDURE — 25000132 ZZH RX MED GY IP 250 OP 250 PS 637: Mod: GY | Performed by: HOSPITALIST

## 2017-04-11 PROCEDURE — A9270 NON-COVERED ITEM OR SERVICE: HCPCS | Mod: GY | Performed by: HOSPITALIST

## 2017-04-11 PROCEDURE — 25000131 ZZH RX MED GY IP 250 OP 636 PS 637: Mod: GY | Performed by: INTERNAL MEDICINE

## 2017-04-11 PROCEDURE — 25000132 ZZH RX MED GY IP 250 OP 250 PS 637: Mod: GY | Performed by: INTERNAL MEDICINE

## 2017-04-11 PROCEDURE — 94640 AIRWAY INHALATION TREATMENT: CPT | Mod: 76

## 2017-04-11 PROCEDURE — 25000128 H RX IP 250 OP 636: Performed by: INTERNAL MEDICINE

## 2017-04-11 PROCEDURE — 85025 COMPLETE CBC W/AUTO DIFF WBC: CPT | Performed by: INTERNAL MEDICINE

## 2017-04-11 PROCEDURE — 00000146 ZZHCL STATISTIC GLUCOSE BY METER IP

## 2017-04-11 PROCEDURE — A9270 NON-COVERED ITEM OR SERVICE: HCPCS | Mod: GY | Performed by: INTERNAL MEDICINE

## 2017-04-11 PROCEDURE — 80069 RENAL FUNCTION PANEL: CPT | Performed by: INTERNAL MEDICINE

## 2017-04-11 PROCEDURE — 25000131 ZZH RX MED GY IP 250 OP 636 PS 637: Mod: GY

## 2017-04-11 PROCEDURE — 36415 COLL VENOUS BLD VENIPUNCTURE: CPT | Performed by: INTERNAL MEDICINE

## 2017-04-11 PROCEDURE — 90937 HEMODIALYSIS REPEATED EVAL: CPT

## 2017-04-11 PROCEDURE — 40000275 ZZH STATISTIC RCP TIME EA 10 MIN

## 2017-04-11 PROCEDURE — 94640 AIRWAY INHALATION TREATMENT: CPT

## 2017-04-11 RX ORDER — IPRATROPIUM BROMIDE AND ALBUTEROL SULFATE 2.5; .5 MG/3ML; MG/3ML
3 SOLUTION RESPIRATORY (INHALATION) EVERY 6 HOURS PRN
Status: DISCONTINUED | OUTPATIENT
Start: 2017-04-11 | End: 2017-04-11 | Stop reason: HOSPADM

## 2017-04-11 RX ORDER — GUAIFENESIN/DEXTROMETHORPHAN 100-10MG/5
5 SYRUP ORAL EVERY 4 HOURS PRN
Qty: 118 ML | Refills: 0 | Status: SHIPPED | OUTPATIENT
Start: 2017-04-11 | End: 2017-08-11

## 2017-04-11 RX ORDER — ALBUTEROL SULFATE 90 UG/1
2 AEROSOL, METERED RESPIRATORY (INHALATION) EVERY 6 HOURS PRN
Qty: 1 INHALER | Refills: 1 | Status: SHIPPED | OUTPATIENT
Start: 2017-04-11

## 2017-04-11 RX ORDER — BENZONATATE 100 MG/1
100 CAPSULE ORAL 3 TIMES DAILY PRN
Qty: 30 CAPSULE | Refills: 0 | Status: SHIPPED | OUTPATIENT
Start: 2017-04-11 | End: 2017-04-11

## 2017-04-11 RX ADMIN — IPRATROPIUM BROMIDE AND ALBUTEROL SULFATE 3 ML: .5; 3 SOLUTION RESPIRATORY (INHALATION) at 07:31

## 2017-04-11 RX ADMIN — BENZONATATE 100 MG: 100 CAPSULE ORAL at 06:39

## 2017-04-11 RX ADMIN — INSULIN GLARGINE 60 UNITS: 100 INJECTION, SOLUTION SUBCUTANEOUS at 07:17

## 2017-04-11 RX ADMIN — GUAIFENESIN 10 ML: 100 SOLUTION ORAL at 06:39

## 2017-04-11 RX ADMIN — PREDNISONE 10 MG: 10 TABLET ORAL at 13:47

## 2017-04-11 RX ADMIN — OMEPRAZOLE 40 MG: 20 CAPSULE, DELAYED RELEASE ORAL at 13:46

## 2017-04-11 RX ADMIN — MYCOPHENOLATE MOFETIL 500 MG: 250 CAPSULE ORAL at 13:46

## 2017-04-11 RX ADMIN — CALCIUM ACETATE 2001 MG: 667 CAPSULE ORAL at 13:45

## 2017-04-11 RX ADMIN — EPOETIN ALFA 8000 UNITS: 4000 SOLUTION INTRAVENOUS; SUBCUTANEOUS at 11:25

## 2017-04-11 RX ADMIN — HYDRALAZINE HYDROCHLORIDE 25 MG: 25 TABLET ORAL at 13:46

## 2017-04-11 RX ADMIN — SODIUM CHLORIDE 250 ML: 9 INJECTION, SOLUTION INTRAVENOUS at 08:15

## 2017-04-11 RX ADMIN — GABAPENTIN 300 MG: 300 CAPSULE ORAL at 13:47

## 2017-04-11 NOTE — DISCHARGE INSTRUCTIONS
Discharge Instructions for Pneumonia  You have been diagnosed with pneumonia, a serious lung infection. Most cases of pneumonia are caused by bacteria. Pneumonia most often occurs in older adults, young children, and people with chronic health problems.  Home care    Take your medication exactly as directed. Don t skip doses. Continue taking your antibiotics as directed until they are all gone -- even if you start to feel better. This will prevent the pneumonia from coming back.    Drink at least 8 glasses of water daily, unless directed otherwise. This helps to loosen and thin secretions so that you can cough them up.    Use a cool-mist humidifier in your bedroom. Be sure to clean the humidifier daily.    Coughing up mucus is normal. Don t use medications to suppress your cough unless your cough is dry, painful, or interferes with your sleep. You may use an expectorant if ordered by your doctor.    Warm compresses or a heating pad on the lowest setting can be used to relieve chest discomfort. Use several times a day for 15 to 20 minutes at a time. (To prevent injuring your skin, be sure the temperature of the compress or heating pad is warm, not hot.)    Get plenty of rest until your fever, shortness of breath, and chest pain go away.    Plan to get a flu shot every year.    Ask your doctor about pneumonia vaccinations.     Follow-up care  Make a follow-up appointment as directed by our staff.     When to seek medical care  Call 911 right away if you have any of the following:    Chest pain    Trouble breathing    Blue lips or fingernails  Otherwise, call your doctor if you have any of the following:    Fever above 101.5 F  (38.6 C)    Yellow, green, bloody, or smelly sputum    More than normal mucus production    Vomiting     [id number] [employer]. [address] [place] [address], [place] [address]. All rights reserved. This information is not intended as a substitute for professional medical care. Always  follow your healthcare professional's instructions.

## 2017-04-11 NOTE — PROGRESS NOTES
Inpatient Dialysis Progress Note            Assessment and Plan:   1.  ESKD.  Stable run.  Trying for 3-4 kg fluid off.  Access working well.  His high BUN may be due to occult GI blood loss with reabsorption + catabolic effect of prednisone.  He does not appear to have 16 kg of fluid to lose by physical exam.      2.  Anemia.  HGB stable at 7-7.5.  Gi losses ?  No melena or hematochezia.      3.  HTN. Controlled.      4.  FEN. K 4.8 on  K2.      He looks fairly good.  I think he cold be discharged after HD today if all still going well.  Next HD Thursday.          Interval History:   Feels well.  Main complaint is cough that is non-productive.  Breathing ambient air.  He has been up walking in halls.  The series of weights suggest up to 16 kg weight gain since admission.  I am do not believe this.    Eating ok.  Denies n/v/f/c.  No dizziness/lightheadedness/cramping.  No abd pain/cp/sob.        Dialysis Parameters:     Wt Readings from Last 4 Encounters:   04/11/17 96.1 kg (211 lb 12.8 oz)   01/29/17 79.9 kg (176 lb 3.2 oz)   01/22/17 82.6 kg (182 lb 1.6 oz)   11/28/16 88.9 kg (196 lb)     I/O last 3 completed shifts:  In: 903 [P.O.:900; I.V.:3]  Out: 150 [Urine:150]  BP Readings from Last 3 Encounters:   04/11/17 131/66   01/29/17 154/80   01/22/17 135/62       Routine, ONE TIME, Starting today For 1 Occurrences  Weight Loss (kg): 3-4  Dialysis Temp: 36.5  C  Access Device: AVF  Access Site: L arm  Dialyzer: Revaclear  Dialysis Bath: K 2  Blood Flow Rate (mL/min): 400  Total Treatment Time (hrs): 3.5  Heparin: none         Medications and Allergies:   Reviewed in EPIC      sodium chloride 0.9%  250-1,000 mL Intravenous Once in dialysis     epoetin brittni (EPOGEN,PROCRIT) inj ESRD  8,000 Units Intravenous Once     insulin glargine  60 Units Subcutaneous QAM AC     insulin aspart  20 Units Subcutaneous TID w/meals     insulin aspart  1-10 Units Subcutaneous TID AC     insulin aspart  1-7 Units Subcutaneous At Bedtime      predniSONE  10 mg Oral Daily     sodium chloride (PF)  3 mL Intravenous Q8H     ipratropium - albuterol 0.5 mg/2.5 mg/3 mL  3 mL Nebulization 4x daily     amLODIPine  10 mg Oral At Bedtime     - MEDICATION INSTRUCTIONS for Dialysis Patients -   Does not apply See Admin Instructions     omeprazole  40 mg Oral Daily     magnesium oxide (MAG-OX) tablet 400 mg  400 mg Oral At Bedtime     losartan  100 mg Oral At Bedtime     hydrALAZINE  25 mg Oral TID     gabapentin  600 mg Oral QPM     B complex-C-folic acid  1 capsule Oral QPM     mycophenolate  500 mg Oral BID     atorvastatin  40 mg Oral At Bedtime     calcium acetate  2,001 mg Oral TID w/meals     carvedilol  12.5 mg Oral BID w/meals     clopidogrel (PLAVIX) tablet 75 mg  75 mg Oral QPM     dapsone  100 mg Oral At Bedtime     dolutegravir  50 mg Oral At Bedtime     darunavir  800 mg Oral At Bedtime     gabapentin  300 mg Oral QAM     isosorbide mononitrate  60 mg Oral QPM     ritonavir  100 mg Oral At Bedtime     abacavir  600 mg Oral QPM     sodium chloride 0.9%, - MEDICATION INSTRUCTIONS -, IV fluid REPLACEMENT ONLY, glucose **OR** dextrose **OR** glucagon, ipratropium - albuterol 0.5 mg/2.5 mg/3 mL, albuterol, benzonatate, nitroglycerin, guaiFENesin, sore throat lozenge, clonazePAM (klonoPIN) tablet 0.5 mg, naloxone, acetaminophen, ondansetron **OR** ondansetron     Allergies   Allergen Reactions     Lisinopril      Sulfa Drugs               Labs:     BMP  Recent Labs  Lab 04/11/17  0628 04/10/17  0729 04/09/17  0620 04/08/17  0612    138 138 137   POTASSIUM 4.8 4.5 4.7 5.0   CHLORIDE 101 99 99 98   PRABHA 9.0 9.3 9.4 9.7   CO2 24 27 27 27   * 120* 84* 104*   CR 8.39* 6.95* 5.15* 6.89*   * 89 264* 208*     CBC  Recent Labs  Lab 04/11/17 0628 04/10/17  0729 04/09/17  0620 04/08/17  0612 04/06/17  0709   WBC 9.9  --  8.2 6.5 5.7   HGB 7.3* 7.4* 7.5* 5.7* 7.4*   HCT 23.0*  --  23.2* 17.5* 24.0*   MCV 90  --  89 91 92     --  153 123*  111*     Lab Results   Component Value Date    AST 23 03/28/2017    ALT 29 03/28/2017    ALKPHOS 120 03/28/2017    BILITOTAL 0.3 04/09/2017    BILICONJ 0.0 07/06/2014            Physical Exam:   Vitals were reviewed in [facility]    Wt Readings from Last 3 Encounters:   04/11/17 96.1 kg (211 lb 12.8 oz)   01/29/17 79.9 kg (176 lb 3.2 oz)   01/22/17 82.6 kg (182 lb 1.6 oz)       Intake/Output Summary (Last 24 hours) at 04/11/17 1009  Last data filed at 04/11/17 0757   Gross per 24 hour   Intake              853 ml   Output              150 ml   Net              703 ml       GENERAL APPEARANCE: pleasant, no distress, a & o  HEENT:  Eyes/ears/nose grossly normal, neck supple  RESP: lungs clear to auscultation with good efforts, no crackles, rhonchi or wheezes  CV: regular rate and rhythm, normal S1 S2, no murmur, click or rub   ABDOMEN: soft, nontender, bowel sounds normal  EXTREMITIES/SKIN: 1+ thigh edema, no rashes or lesions     Pt seen on dialysis.  Stable run.  Good BFR.      Attestation:  I have reviewed today's vital signs, notes, medications, labs and imaging.     Rudolph Wilkes MD  [facility] - Nephrology  [phone]

## 2017-04-11 NOTE — PROGRESS NOTES
Lab Results   Component Value Date    POTASSIUM 4.8 04/11/2017     Lab Results   Component Value Date    HGB 7.3 04/11/2017        All machine safety checks completed and passed.  Total chlorine checks less than 0.1ppm   All connections secured, saline line double clamped.  Venous and arterial parameters set  Report rec'd from Meme Barnes RN    Rec'd pt per  acc'd by transport team. Consent obtained for dialysis Rx this hospitalization.  Good circulation to fistula arm. Time out taken.    LAF cannulated with 15 Ga needles with no difficulty. Tourniquet used.     Dialysis started with good flows.   Pt ran 3.5  hours on a K 3 bath, with 3 L removed. Blood flow rate of 400 ml/min was obtained.   PRE-WEIGHT:96.1 KG,    TARGET WEIGHT 83.5 KG,     POST-WT 93.1 Kg.        Complications: cramping with 2700 ml removed, UF goal decreased from 3.5 to 3 L with resolution of cramping. .   Education regarding ESRD given to patient with good understanding.     Vascular Access: Aseptic prep done for both on/off  Total Heparin given: none      Meds given: none.        Dr. Wilkes visited pt during run.   Transducers checked Q 15 minutes, remain clear throughout Rx.  Machine water alarms in place and functioning.  Total blood volume processed:78.5 L  Hemostasis of needle sites achieved after 20 minutes.   Patient dialyzes at [place] Q T-TH-SA.  POST ASSESSMENTS: Skin warm and dry, alert, denies discomfort, Lungs clear, Resp rate regular, apical rate regular. No edema.  See flowsheet in EPIC for further details.  Report given to Meme Barnes RN.     Whitney Mendez RN  [facility]

## 2017-04-11 NOTE — PROVIDER NOTIFICATION
04/11/17 1300   RCAT Assessment   Reason for Assessment Other (see comments)  (Influenza)   Pulmonary Status 3   Surgical Status 0   Chest X-ray 2   Respiratory Pattern 0   Mental Status 0   Breath Sounds 2   Cough Effectiveness 0   Level of Activity 0   O2 Required for SpO2>=92% 0   Acuity Level (points) 7   Acuity Level  4   Re-eval Interval Guideline Every 3 days   Re-evaluation Date 04/14/17   Clinical Indications   Aerosol Hygiene RCAT protocol   Volume Expansion Therapy Decreased breath sounds   Aerosol Therapy Plan   RT Treatment Nebulizer   Aerosol Treatment Frequency Acuity Level 4: PRN [id number] wheezing   Broncho-Pulmonary Hygiene Plan   Broncho-Pulmonary Hygiene Treatment Coughing techniques   Broncho-Pulm Hygiene Frequency Acuity Level 4: BID-Unable to deep breathe & cough spontaneously   Volume Expansion Plan   Volume Expansion Treatment Incentive Spirometer   Volume Expansion Frequency Acuity Level 4: BID-Prevention of atelectasis     Pt scored Acuity Level 4, nebs changed to Q6 prn. BS diminished.

## 2017-04-11 NOTE — PLAN OF CARE
Problem: Goal Outcome Summary  Goal: Goal Outcome Summary  Outcome: No Change  A&Ox4, very pleasant. VSS. Tele SR. Bruit and thrill noted in left arm. Cough remains - PRN Robitussin and tessalon given with some relief. Slept well overnight. Plan for HD this morning with possible d/c home after.

## 2017-04-11 NOTE — DISCHARGE SUMMARY
PRINCIPAL FINAL DIAGNOSES:   1.  Influenza A pneumonia.   2.  Possible secondary community-acquired bacterial pneumonia, treated with antibiotics this admission.  Completed antibiotic therapy prior to discharge.   3.  History of human immunodeficiency virus without major opportunistic infections in the past, with most recent T cells somewhat low at 184.  Felt unlikely to have opportunistic infection this admission per Infectious Disease consultation.   4.  Anemia, requiring 6 units packed red blood cell transfusion this admission.  Etiology unclear.  Patient had a history of anemia and recent EGD performed 02/2017 showing no acute pathology.  The patient's aspirin was discontinued this admission given concern that he could have more distal small-bowel bleeding from ulcerations from NSAID use.  Recommend repeat hemoglobin in the next 3-5 days.  For the last 3 days, his hemoglobin remained stable in 7.3-7.5 range.  No evidence of hemolysis on labs and no evidence of active gastrointestinal bleeding.  Appreciate Hematology consultation this admission.      PAST MEDICAL HISTORY:   1.  End-stage renal disease, on chronic dialysis.   2.  History of nephrolithiasis.   3.  History of anemia as mentioned above.   4.  Diabetes mellitus with hyperglycemia this admission related to prednisone use.   5.  Nosocomial pneumonia in 01/2017.   6.  History of TIA.   7.  Non-ST elevation myocardial infarction with previous percutaneous intervention.   8.  Hyperlipidemia.   9.  Hypertension.   10.  Acute respiratory failure 01/2017 related to healthcare-associated pneumonia.  At that time, he was intubated and per chart review had a code blue event.  Details unclear.   11.  Cholecystectomy.   12.  Appendectomy.      PRINCIPAL PROCEDURES THIS ADMISSION:   1.  Nephrology consultation.   2.  Dialysis.   3.  Chest x-ray showing patchy right perihilar and peribronchial infiltrates.   4.  Influenza titer that was positive for influenza A.    5.  Hematology consultation.   6.  Peripheral smear showing no obvious cause for anemia.   7.  Six units packed red blood cell transfusion this admission.      REASON FOR ADMISSION:  Please see dictated history and physical by Dr. Diaz.  In brief, Mr. Donald Raza is a pleasant 67-year-old male with a complex medical history including end-stage renal disease, on hemodialysis, HIV who presented to the hospital with concerns about cough, fever and weakness of 1-day duration.  On presentation, patient was noted to have infiltrates on chest x-ray.  He has also had a positive influenza A antigen.  He is admitted to the Hospitalist Service.      HOSPITAL COURSE:  Influenza A pneumonia.  The patient's course was one of improvement while hospitalized.  He was treated with Tamiflu.  There was also concern about possible secondary bacterial pneumonia or community-acquired pneumonia and the patient was treated with antimicrobials for this as well.  He completed all antimicrobial therapy prior to discharge and off antimicrobials remained afebrile without evidence of persistent infection.      He was treated with prednisone while hospitalized for bronchospasm related to infection, which caused significant elevation of his hyperglycemia and uncontrolled diabetes mellitus while in the hospital.  At one point, he did require an insulin drip, but with dose titration of insulin and decrease in prednisone dosing, hyperglycemia was much improved by discharge.      In regards to the patient's anemia, he has a history of anemia.  His previous workup has included an EGD at outside facility done 02/2017 showing no acute findings.  Unclear what the cause of his anemia is, but is suspected primarily to be anemia of chronic disease related to chronic kidney disease.  He did require 6 units packed red blood cell transfusion this admission.  There was no evidence of active GI bleeding.  We also did a workup to rule out hemolysis which was  negative.  Peripheral smear showed no obvious etiology either.  Appreciate Hematology input this admission.  For the past 3 days, his hemoglobin has been stable near 7.5.  At this point, we have stopped aspirin in the setting of his anemia given that potentially aspirin could cause some proximal small-bowel ulcerations not seen on recent EGD.      I do recommend repeat hemoglobin in 3-5 days to ensure stability.      Patient appeared stable for discharge from the hospital today.      DISCHARGE MEDICATIONS:   1.  Albuterol inhaler as needed for shortness of breath.   2.  Robitussin as needed for cough.   3.  Abacavir 600 mg every evening.   4.  Amlodipine 5 mg at bedtime.   5.  Anucort suppository as needed.   6.  Atorvastatin 40 mg at bedtime.   7.  Multivitamin daily.   8.  Calcium acetate 2001 mg 3 times daily with meals.   9.  Carvedilol 12.5 mg twice a day.   10.  Peridex solution.   11.  Klonopin 0.5 mg nightly as needed for anxiety.   12.  Plavix 75 mg every evening.   13.  Dapsone 100 mg at bedtime.   14.  Dialyvite 1 tablet daily.   15.  Dolutegravir 50 mg at bedtime.   16.  Doxercalciferol 6 mcg into the vein 3 times a week with dialysis.   17.  Erythropoietin 11,000 units subcutaneous 3 times a week with dialysis.   18.  Gabapentin 300 mg in the morning and 600 mg in the evening.   19.  Humalog insulin 3 units with meals based on sliding scale.   20.  Humalog insulin 5 units 3 times a day with meals.   21.  Hydralazine 25 mg 3 times a day.   22.  Aldara cream as needed.   23.  Glargine 50 units every morning.   24.  Iron sucrose 50 mg into the vein once a week with dialysis.   25.  Imdur 60 mg every evening.   26.  Lamisil as needed.   27.  Losartan 100 mg at bedtime.   28.  Magnesium oxide 400 mg at bedtime.   29.  Mycophenolate 500 mg twice a day.   30.  Nepro 1-2 times daily.   31.  Nitroglycerin as needed.   32.  Nystatin cream.   33.  Prezista 800 mg at bedtime.   34.  Prilosec 40 mg 1 hour before  dinner.   35.  Ritonavir 1 capsule at bedtime.   36.  Medications stopped include aspirin in the setting of his anemia as mentioned above.      FOLLOWUP INSTRUCTIONS:   1.  Recommend to have your hemoglobin checked in the next 3-5 days either at dialysis or through your primary MD office.  Hemoglobin is 7.3 on discharge from the hospital today.   2.  Recommend you have a repeat chest x-ray done in 4-6 weeks with your primary MD to make sure pneumonia is resolved on x-ray.   3.  Stop taking aspirin.  This was stopped as it was possible this could be contributing to your low blood counts and anemia.         AMY VERMA MD             D: 2017 18:09   T: 2017 18:51   MT: SHIN      Name:     GREGORIO BRUSH   MRN:      [id number]        Account:        [id number]   :      1950           Admit Date:     [id number]                                  Discharge Date: 2017      Document: [id number]

## 2017-04-11 NOTE — PROGRESS NOTES
[facility]  Infectious Disease Progress Note          Assessment and Plan:   IMPRESSION:   1. A 67-year-old male, complicated medical history, admitted with acute respiratory symptoms found to be influenza A positive, almost certainly that is the primary diagnosis, some infiltrate, certainly at risk for secondary pneumonia, being treated as such.   2. History of human immunodeficiency virus infection, without major opportunistic infections controlled disease, most recent T cells somewhat low at 184, but not likely to have an opportunistic infection.   3. Prior hospitalization in January with chest pain and a history of coronary disease.   4. Nosocomial pneumonia as a complication of the above hospitalization including intubation in January, respiratory status stable since.   5. Diabetes mellitus.   6. End-stage renal disease on chronic dialysis.   7. Sulfa allergy.   8. History of nephrolithiasis.   9 Anemia      RECOMMENDATIONS:   1. Completed influenza tx with Tamiflu at renal adjusted dose.   2.  Watching off antimicrobials, no fever hypoxia Ok    3. Continue HIV meds as previously.  5 Disposition today Ok with us          Interval History:   no new complaints and doing Ok resolved chest pain, O2 Ok on RA, no + cxs LGF resolved              Medications:       insulin glargine  60 Units Subcutaneous QAM AC     insulin aspart  20 Units Subcutaneous TID w/meals     insulin aspart  1-10 Units Subcutaneous TID AC     insulin aspart  1-7 Units Subcutaneous At Bedtime     predniSONE  10 mg Oral Daily     sodium chloride (PF)  3 mL Intravenous Q8H     amLODIPine  10 mg Oral At Bedtime     - MEDICATION INSTRUCTIONS for Dialysis Patients -   Does not apply See Admin Instructions     omeprazole  40 mg Oral Daily     magnesium oxide (MAG-OX) tablet 400 mg  400 mg Oral At Bedtime     losartan  100 mg Oral At Bedtime     hydrALAZINE  25 mg Oral TID     gabapentin  600 mg Oral QPM     B complex-C-folic  "acid  1 capsule Oral QPM     mycophenolate  500 mg Oral BID     atorvastatin  40 mg Oral At Bedtime     calcium acetate  2,001 mg Oral TID w/meals     carvedilol  12.5 mg Oral BID w/meals     clopidogrel (PLAVIX) tablet 75 mg  75 mg Oral QPM     dapsone  100 mg Oral At Bedtime     dolutegravir  50 mg Oral At Bedtime     darunavir  800 mg Oral At Bedtime     gabapentin  300 mg Oral QAM     isosorbide mononitrate  60 mg Oral QPM     ritonavir  100 mg Oral At Bedtime     abacavir  600 mg Oral QPM                  Physical Exam:   Blood pressure 142/64, pulse 82, temperature 97.1  F (36.2  C), temperature source Oral, resp. rate 20, height 1.803 m (5' 11\"), weight 96.1 kg (211 lb 12.8 oz), SpO2 96 %.  Wt Readings from Last 2 Encounters:   04/11/17 96.1 kg (211 lb 12.8 oz)   01/29/17 79.9 kg (176 lb 3.2 oz)     Vital Signs with Ranges  Temp:  [95.3  F (35.2  C)-98.1  F (36.7  C)] 97.1  F (36.2  C)  Heart Rate:  [65-81] 74  Resp:  [12-24] 20  BP: (122-147)/(58-72) 142/64  SpO2:  [92 %-97 %] 96 %    Constitutional: Awake, alert, cooperative, no apparent distress   Lungs: Congestion to auscultation bilaterally, few crackles some wheezing   Cardiovascular: Regular rate and rhythm, normal S1 and S2, and no murmur noted   Abdomen: Normal bowel sounds, soft, non-distended, non-tender   Skin: No rashes, no cyanosis, no edema   Other:           Data:   All microbiology laboratory data reviewed.  Recent Labs   Lab Test  04/11/17   0628  04/10/17   0729  04/09/17   0620 04/08/17   0612   WBC  9.9   --   8.2  6.5   HGB  7.3*  7.4*  7.5*  5.7*   HCT  23.0*   --   23.2*  17.5*   MCV  90   --   89  91   PLT  181   --   153  123*     Recent Labs   Lab Test  04/11/17   0628  04/10/17   0729  04/09/17   0620   CR  8.39*  6.95*  5.15*     No lab results found.  Recent Labs   Lab Test  03/30/17   0800  03/29/17   0131  03/28/17   2044  03/28/17 2015 03/28/17   1953  01/07/17   1150  01/05/17 2012 01/05/17 2000 05/19/16   1730 "   CULT  Light growth Normal subhash  Canceled, Test credited >10 Squamous epithelial cells/low power field indicates   oral contamination. Please recollect. Notification of test cancellation was   given to Jennifer Pacheco RN [id number] 03.29.17 CF  *  10,000 to 50,000 colonies/mL mixed urogenital subhash  Susceptibility testing not routinely done    No growth  No growth  No growth  Incorrectly ordered by PCU/Clinic Canceled, Test credited ordered sputum culture   instead.    No growth  No growth  No growth

## 2017-04-11 NOTE — PLAN OF CARE
Problem: Goal Outcome Summary  Goal: Goal Outcome Summary  Outcome: Adequate for Discharge Date Met:  04/11/17  Patient verbalized understanding of his discharge instruction. Patient discharge home with his wife.

## 2017-04-11 NOTE — PLAN OF CARE
Problem: Pneumonia (Adult)  Goal: Signs and Symptoms of Listed Potential Problems Will be Absent or Manageable (Pneumonia)  Signs and symptoms of listed potential problems will be absent or manageable by discharge/transition of care (reference Pneumonia (Adult) CPG).   VSS, afebrile. Tele SR. Completed tamiflu, off antibiotics. PRN Robitussin given for nonproductive cough. Prednisone daily. Left HD fistula. Transfers SBA. Plan to DC home tomorrow after dialysis.

## 2017-04-11 NOTE — PROGRESS NOTES
Pt discharged to home, accompanied by family. Discharge instructions reviewed by staff/days. Pt stated understanding and had no questions. IV dcd/days. Personal belongings and discharge meds sent with pt. Pt transferred to first floor via .

## 2017-04-12 LAB — INTERPRETATION ECG - MUSE: NORMAL

## 2017-04-12 NOTE — PROGRESS NOTES
Hand-off for Care Transitions to Next Level of Care Provider  Name: Donald Raza  MRN #: [id number]  Reason for Hospitalization:  Human immunodeficiency virus (HIV) disease (H) [B20]  Healthcare-associated pneumonia [J18.9]  Severe sepsis (H) [A41.9, R65.20]  Anemia, unspecified type [D64.9]  Admit Date/Time: 3/28/2017  7:30 PM  Discharge Date: 4/11/17    Reason for Communication Hand-off Referral: Admission diagnoses: PN    Discharge Plan:  Discharged to: Home-independent                   Patient agreeable to post-hospital support suggestions:  Yes  Discharge Plan:             Patient is on new medications:   Yes  albuterol 108 (90 BASE) MCG/ACT Inhaler   Commonly known as: PROAIR HFA/PROVENTIL HFA/VENTOLIN HFA   Used for: Cough        Dose: 2 puff   Inhale 2 puffs into the lungs every 6 hours as needed for shortness of breath / dyspnea or wheezing   Quantity: 1 Inhaler   Refills: 1         guaiFENesin-dextromethorphan 100-10 MG/5ML syrup   Commonly known as: ROBITUSSIN DM   Used for: Cough        Dose: 5 mL   Take 5 mLs by mouth every 4 hours as needed for cough   Quantity: 118 mL   Refills: 0                   MTM follow up recommended: Yes                        Follow-up specialty is recommended: Yes  This would communicate all referrals e.g. , CORE clinic, Homecare, Cardiac Rehab    Follow-up plan:  Future Appointments  Date Time Provider Department Center   5/15/2017 8:45 AM Arnoldo Diaz MD [facility]   5/16/2017 8:40 AM Hal Rodriguez MD [facility]       Any outstanding tests or procedures:              Key Recommendations: repeat chest xray in 4-6 weeks with PCP to make sure pneumonia has resolved. This has been the patients 3rd admit this year with pna or diabetes related, unsure if there is something more we can do for him at home to help prevent theses admissions?    Kaci Huitron [id number]

## 2017-04-15 ENCOUNTER — APPOINTMENT (OUTPATIENT)
Dept: CT IMAGING | Facility: CLINIC | Age: 67
DRG: 682 | End: 2017-04-15
Attending: EMERGENCY MEDICINE
Payer: MEDICARE

## 2017-04-15 ENCOUNTER — APPOINTMENT (OUTPATIENT)
Dept: GENERAL RADIOLOGY | Facility: CLINIC | Age: 67
DRG: 682 | End: 2017-04-15
Attending: EMERGENCY MEDICINE
Payer: MEDICARE

## 2017-04-15 ENCOUNTER — HOSPITAL ENCOUNTER (INPATIENT)
Facility: CLINIC | Age: 67
LOS: 3 days | Discharge: HOME | DRG: 682 | End: 2017-04-18
Attending: EMERGENCY MEDICINE | Admitting: INTERNAL MEDICINE
Payer: MEDICARE

## 2017-04-15 DIAGNOSIS — M62.81 GENERALIZED MUSCLE WEAKNESS: ICD-10-CM

## 2017-04-15 DIAGNOSIS — N18.6 ESRD (END STAGE RENAL DISEASE) ON DIALYSIS (H): ICD-10-CM

## 2017-04-15 DIAGNOSIS — R00.1 SINUS BRADYCARDIA: ICD-10-CM

## 2017-04-15 DIAGNOSIS — D64.9 ANEMIA, UNSPECIFIED TYPE: ICD-10-CM

## 2017-04-15 DIAGNOSIS — E11.22 TYPE 2 DIABETES MELLITUS WITH CHRONIC KIDNEY DISEASE ON CHRONIC DIALYSIS, UNSPECIFIED LONG TERM INSULIN USE STATUS: Primary | ICD-10-CM

## 2017-04-15 DIAGNOSIS — R51.9 ACUTE NONINTRACTABLE HEADACHE, UNSPECIFIED HEADACHE TYPE: ICD-10-CM

## 2017-04-15 DIAGNOSIS — Z99.2 ESRD (END STAGE RENAL DISEASE) ON DIALYSIS (H): ICD-10-CM

## 2017-04-15 DIAGNOSIS — I15.1 HYPERTENSION SECONDARY TO OTHER RENAL DISORDERS: ICD-10-CM

## 2017-04-15 DIAGNOSIS — N18.6 TYPE 2 DIABETES MELLITUS WITH CHRONIC KIDNEY DISEASE ON CHRONIC DIALYSIS, UNSPECIFIED LONG TERM INSULIN USE STATUS: Primary | ICD-10-CM

## 2017-04-15 DIAGNOSIS — Z99.2 TYPE 2 DIABETES MELLITUS WITH CHRONIC KIDNEY DISEASE ON CHRONIC DIALYSIS, UNSPECIFIED LONG TERM INSULIN USE STATUS: Primary | ICD-10-CM

## 2017-04-15 LAB
ALBUMIN SERPL-MCNC: 3 G/DL (ref 3.4–5)
ALP SERPL-CCNC: 77 U/L (ref 40–150)
ALT SERPL W P-5'-P-CCNC: 22 U/L (ref 0–70)
ANION GAP SERPL CALCULATED.3IONS-SCNC: 13 MMOL/L (ref 3–14)
APTT PPP: 32 SEC (ref 22–37)
AST SERPL W P-5'-P-CCNC: 20 U/L (ref 0–45)
BASOPHILS # BLD AUTO: 0 10E9/L (ref 0–0.2)
BASOPHILS NFR BLD AUTO: 0.4 %
BILIRUB SERPL-MCNC: 0.4 MG/DL (ref 0.2–1.3)
BUN SERPL-MCNC: 73 MG/DL (ref 7–30)
CALCIUM SERPL-MCNC: 8.6 MG/DL (ref 8.5–10.1)
CHLORIDE SERPL-SCNC: 99 MMOL/L (ref 94–109)
CO2 BLDCOV-SCNC: 26 MMOL/L (ref 21–28)
CO2 SERPL-SCNC: 26 MMOL/L (ref 20–32)
CREAT SERPL-MCNC: 7.48 MG/DL (ref 0.66–1.25)
DIFFERENTIAL METHOD BLD: ABNORMAL
EOSINOPHIL # BLD AUTO: 0.1 10E9/L (ref 0–0.7)
EOSINOPHIL NFR BLD AUTO: 1.4 %
ERYTHROCYTE [DISTWIDTH] IN BLOOD BY AUTOMATED COUNT: 17 % (ref 10–15)
GFR SERPL CREATININE-BSD FRML MDRD: 7 ML/MIN/1.7M2
GLUCOSE BLDC GLUCOMTR-MCNC: 184 MG/DL (ref 70–99)
GLUCOSE BLDC GLUCOMTR-MCNC: 245 MG/DL (ref 70–99)
GLUCOSE BLDC GLUCOMTR-MCNC: 75 MG/DL (ref 70–99)
GLUCOSE SERPL-MCNC: 196 MG/DL (ref 70–99)
HCT VFR BLD AUTO: 22.4 % (ref 40–53)
HGB BLD-MCNC: 7.1 G/DL (ref 13.3–17.7)
IMM GRANULOCYTES # BLD: 0 10E9/L (ref 0–0.4)
IMM GRANULOCYTES NFR BLD: 0.6 %
INR PPP: 1.09 (ref 0.86–1.14)
LACTATE BLD-SCNC: 1.8 MMOL/L (ref 0.7–2.1)
LYMPHOCYTES # BLD AUTO: 1.1 10E9/L (ref 0.8–5.3)
LYMPHOCYTES NFR BLD AUTO: 15.9 %
MAGNESIUM SERPL-MCNC: 2.8 MG/DL (ref 1.6–2.3)
MCH RBC QN AUTO: 29.6 PG (ref 26.5–33)
MCHC RBC AUTO-ENTMCNC: 31.7 G/DL (ref 31.5–36.5)
MCV RBC AUTO: 93 FL (ref 78–100)
MONOCYTES # BLD AUTO: 0.5 10E9/L (ref 0–1.3)
MONOCYTES NFR BLD AUTO: 7.6 %
NEUTROPHILS # BLD AUTO: 5.3 10E9/L (ref 1.6–8.3)
NEUTROPHILS NFR BLD AUTO: 74.1 %
NRBC # BLD AUTO: 0 10*3/UL
NRBC BLD AUTO-RTO: 0 /100
NT-PROBNP SERPL-MCNC: ABNORMAL PG/ML (ref 0–900)
PCO2 BLDV: 40 MM HG (ref 40–50)
PH BLDV: 7.42 PH (ref 7.32–7.43)
PHOSPHATE SERPL-MCNC: 6.9 MG/DL (ref 2.5–4.5)
PLATELET # BLD AUTO: 114 10E9/L (ref 150–450)
PO2 BLDV: 24 MM HG (ref 25–47)
POTASSIUM SERPL-SCNC: 5.1 MMOL/L (ref 3.4–5.3)
PROT SERPL-MCNC: 6.5 G/DL (ref 6.8–8.8)
RBC # BLD AUTO: 2.4 10E12/L (ref 4.4–5.9)
SAO2 % BLDV FROM PO2: 45 %
SODIUM SERPL-SCNC: 138 MMOL/L (ref 133–144)
TROPONIN I BLD-MCNC: 0.02 UG/L (ref 0–0.1)
TROPONIN I SERPL-MCNC: 0.02 UG/L (ref 0–0.04)
WBC # BLD AUTO: 7.1 10E9/L (ref 4–11)

## 2017-04-15 PROCEDURE — 96360 HYDRATION IV INFUSION INIT: CPT

## 2017-04-15 PROCEDURE — 99223 1ST HOSP IP/OBS HIGH 75: CPT | Mod: AI | Performed by: INTERNAL MEDICINE

## 2017-04-15 PROCEDURE — 86923 COMPATIBILITY TEST ELECTRIC: CPT | Performed by: EMERGENCY MEDICINE

## 2017-04-15 PROCEDURE — 40000275 ZZH STATISTIC RCP TIME EA 10 MIN

## 2017-04-15 PROCEDURE — 25000131 ZZH RX MED GY IP 250 OP 636 PS 637: Mod: GY | Performed by: INTERNAL MEDICINE

## 2017-04-15 PROCEDURE — 83605 ASSAY OF LACTIC ACID: CPT

## 2017-04-15 PROCEDURE — 86900 BLOOD TYPING SEROLOGIC ABO: CPT | Performed by: EMERGENCY MEDICINE

## 2017-04-15 PROCEDURE — 96361 HYDRATE IV INFUSION ADD-ON: CPT

## 2017-04-15 PROCEDURE — 85610 PROTHROMBIN TIME: CPT | Performed by: EMERGENCY MEDICINE

## 2017-04-15 PROCEDURE — 83735 ASSAY OF MAGNESIUM: CPT | Performed by: EMERGENCY MEDICINE

## 2017-04-15 PROCEDURE — 00000146 ZZHCL STATISTIC GLUCOSE BY METER IP

## 2017-04-15 PROCEDURE — 86901 BLOOD TYPING SEROLOGIC RH(D): CPT | Performed by: EMERGENCY MEDICINE

## 2017-04-15 PROCEDURE — 25000128 H RX IP 250 OP 636: Performed by: INTERNAL MEDICINE

## 2017-04-15 PROCEDURE — 84484 ASSAY OF TROPONIN QUANT: CPT | Performed by: EMERGENCY MEDICINE

## 2017-04-15 PROCEDURE — A9270 NON-COVERED ITEM OR SERVICE: HCPCS | Mod: GY

## 2017-04-15 PROCEDURE — 80053 COMPREHEN METABOLIC PANEL: CPT | Performed by: EMERGENCY MEDICINE

## 2017-04-15 PROCEDURE — 25000125 ZZHC RX 250: Performed by: EMERGENCY MEDICINE

## 2017-04-15 PROCEDURE — 84484 ASSAY OF TROPONIN QUANT: CPT

## 2017-04-15 PROCEDURE — 94640 AIRWAY INHALATION TREATMENT: CPT

## 2017-04-15 PROCEDURE — A9270 NON-COVERED ITEM OR SERVICE: HCPCS | Mod: GY | Performed by: INTERNAL MEDICINE

## 2017-04-15 PROCEDURE — 86850 RBC ANTIBODY SCREEN: CPT | Performed by: EMERGENCY MEDICINE

## 2017-04-15 PROCEDURE — 71020 XR CHEST 2 VW: CPT

## 2017-04-15 PROCEDURE — 85730 THROMBOPLASTIN TIME PARTIAL: CPT | Performed by: EMERGENCY MEDICINE

## 2017-04-15 PROCEDURE — 82803 BLOOD GASES ANY COMBINATION: CPT

## 2017-04-15 PROCEDURE — 63400005 ZZH RX 634: Performed by: INTERNAL MEDICINE

## 2017-04-15 PROCEDURE — 84100 ASSAY OF PHOSPHORUS: CPT | Performed by: EMERGENCY MEDICINE

## 2017-04-15 PROCEDURE — 99285 EMERGENCY DEPT VISIT HI MDM: CPT | Mod: 25

## 2017-04-15 PROCEDURE — 93005 ELECTROCARDIOGRAM TRACING: CPT

## 2017-04-15 PROCEDURE — 85025 COMPLETE CBC W/AUTO DIFF WBC: CPT | Performed by: EMERGENCY MEDICINE

## 2017-04-15 PROCEDURE — 25000132 ZZH RX MED GY IP 250 OP 250 PS 637: Mod: GY | Performed by: INTERNAL MEDICINE

## 2017-04-15 PROCEDURE — 25000128 H RX IP 250 OP 636: Performed by: EMERGENCY MEDICINE

## 2017-04-15 PROCEDURE — 83880 ASSAY OF NATRIURETIC PEPTIDE: CPT | Performed by: EMERGENCY MEDICINE

## 2017-04-15 PROCEDURE — 12000007 ZZH R&B INTERMEDIATE

## 2017-04-15 PROCEDURE — 25000132 ZZH RX MED GY IP 250 OP 250 PS 637: Mod: GY

## 2017-04-15 PROCEDURE — 70450 CT HEAD/BRAIN W/O DYE: CPT

## 2017-04-15 PROCEDURE — 90937 HEMODIALYSIS REPEATED EVAL: CPT

## 2017-04-15 RX ORDER — GABAPENTIN 300 MG/1
600 CAPSULE ORAL EVERY EVENING
Status: DISCONTINUED | OUTPATIENT
Start: 2017-04-15 | End: 2017-04-18 | Stop reason: HOSPADM

## 2017-04-15 RX ORDER — NALOXONE HYDROCHLORIDE 0.4 MG/ML
.1-.4 INJECTION, SOLUTION INTRAMUSCULAR; INTRAVENOUS; SUBCUTANEOUS
Status: DISCONTINUED | OUTPATIENT
Start: 2017-04-15 | End: 2017-04-18 | Stop reason: HOSPADM

## 2017-04-15 RX ORDER — ONDANSETRON 2 MG/ML
4 INJECTION INTRAMUSCULAR; INTRAVENOUS EVERY 6 HOURS PRN
Status: DISCONTINUED | OUTPATIENT
Start: 2017-04-15 | End: 2017-04-18 | Stop reason: HOSPADM

## 2017-04-15 RX ORDER — HYDROCORTISONE ACETATE 25 MG/1
25 SUPPOSITORY RECTAL 2 TIMES DAILY PRN
Status: DISCONTINUED | OUTPATIENT
Start: 2017-04-15 | End: 2017-04-18 | Stop reason: HOSPADM

## 2017-04-15 RX ORDER — ACETAMINOPHEN 325 MG/1
650 TABLET ORAL EVERY 4 HOURS PRN
Status: DISCONTINUED | OUTPATIENT
Start: 2017-04-15 | End: 2017-04-18 | Stop reason: HOSPADM

## 2017-04-15 RX ORDER — HYDRALAZINE HYDROCHLORIDE 25 MG/1
25 TABLET, FILM COATED ORAL 2 TIMES DAILY PRN
Status: DISCONTINUED | OUTPATIENT
Start: 2017-04-15 | End: 2017-04-18 | Stop reason: HOSPADM

## 2017-04-15 RX ORDER — CLONAZEPAM 0.5 MG/1
0.5 TABLET ORAL
Status: DISCONTINUED | OUTPATIENT
Start: 2017-04-15 | End: 2017-04-18 | Stop reason: HOSPADM

## 2017-04-15 RX ORDER — ATORVASTATIN CALCIUM 40 MG/1
40 TABLET, FILM COATED ORAL AT BEDTIME
Status: DISCONTINUED | OUTPATIENT
Start: 2017-04-15 | End: 2017-04-18 | Stop reason: HOSPADM

## 2017-04-15 RX ORDER — ACETAMINOPHEN 500 MG
1000 TABLET ORAL ONCE
Status: COMPLETED | OUTPATIENT
Start: 2017-04-15 | End: 2017-04-15

## 2017-04-15 RX ORDER — AMLODIPINE BESYLATE 5 MG/1
5 TABLET ORAL DAILY PRN
COMMUNITY
End: 2017-11-21

## 2017-04-15 RX ORDER — POLYETHYLENE GLYCOL 3350 17 G/17G
17 POWDER, FOR SOLUTION ORAL DAILY PRN
Status: DISCONTINUED | OUTPATIENT
Start: 2017-04-15 | End: 2017-04-18 | Stop reason: HOSPADM

## 2017-04-15 RX ORDER — ALBUTEROL SULFATE 90 UG/1
2 AEROSOL, METERED RESPIRATORY (INHALATION) EVERY 6 HOURS PRN
Status: DISCONTINUED | OUTPATIENT
Start: 2017-04-15 | End: 2017-04-18 | Stop reason: HOSPADM

## 2017-04-15 RX ORDER — ISOSORBIDE MONONITRATE 60 MG/1
60 TABLET, EXTENDED RELEASE ORAL EVERY EVENING
Status: DISCONTINUED | OUTPATIENT
Start: 2017-04-15 | End: 2017-04-18 | Stop reason: HOSPADM

## 2017-04-15 RX ORDER — NITROGLYCERIN 0.4 MG/1
0.4 TABLET SUBLINGUAL EVERY 5 MIN PRN
Status: DISCONTINUED | OUTPATIENT
Start: 2017-04-15 | End: 2017-04-18 | Stop reason: HOSPADM

## 2017-04-15 RX ORDER — ONDANSETRON 4 MG/1
4 TABLET, ORALLY DISINTEGRATING ORAL EVERY 6 HOURS PRN
Status: DISCONTINUED | OUTPATIENT
Start: 2017-04-15 | End: 2017-04-18 | Stop reason: HOSPADM

## 2017-04-15 RX ORDER — AMOXICILLIN 250 MG
1-2 CAPSULE ORAL 2 TIMES DAILY PRN
Status: DISCONTINUED | OUTPATIENT
Start: 2017-04-15 | End: 2017-04-18 | Stop reason: HOSPADM

## 2017-04-15 RX ORDER — DEXTROSE MONOHYDRATE 25 G/50ML
25-50 INJECTION, SOLUTION INTRAVENOUS
Status: DISCONTINUED | OUTPATIENT
Start: 2017-04-15 | End: 2017-04-18 | Stop reason: HOSPADM

## 2017-04-15 RX ORDER — GUAIFENESIN/DEXTROMETHORPHAN 100-10MG/5
5 SYRUP ORAL EVERY 4 HOURS PRN
Status: DISCONTINUED | OUTPATIENT
Start: 2017-04-15 | End: 2017-04-18 | Stop reason: HOSPADM

## 2017-04-15 RX ORDER — ACETAMINOPHEN 500 MG
TABLET ORAL
Status: COMPLETED
Start: 2017-04-15 | End: 2017-04-15

## 2017-04-15 RX ORDER — AMLODIPINE BESYLATE 5 MG/1
5 TABLET ORAL DAILY PRN
Status: DISCONTINUED | OUTPATIENT
Start: 2017-04-15 | End: 2017-04-18 | Stop reason: HOSPADM

## 2017-04-15 RX ORDER — FOLIC AC/VIT BCOMP,C/ZN/VIT D3 0.8MG-2
1 TABLET ORAL DAILY
Status: DISCONTINUED | OUTPATIENT
Start: 2017-04-15 | End: 2017-04-15

## 2017-04-15 RX ORDER — GABAPENTIN 300 MG/1
300 CAPSULE ORAL EVERY MORNING
Status: DISCONTINUED | OUTPATIENT
Start: 2017-04-16 | End: 2017-04-18 | Stop reason: HOSPADM

## 2017-04-15 RX ORDER — PROCHLORPERAZINE MALEATE 5 MG
5 TABLET ORAL EVERY 6 HOURS PRN
Status: DISCONTINUED | OUTPATIENT
Start: 2017-04-15 | End: 2017-04-18 | Stop reason: HOSPADM

## 2017-04-15 RX ORDER — NICOTINE POLACRILEX 4 MG
15-30 LOZENGE BUCCAL
Status: DISCONTINUED | OUTPATIENT
Start: 2017-04-15 | End: 2017-04-18 | Stop reason: HOSPADM

## 2017-04-15 RX ORDER — DOXERCALCIFEROL 4 UG/2ML
1.5 INJECTION INTRAVENOUS
Status: COMPLETED | OUTPATIENT
Start: 2017-04-15 | End: 2017-04-15

## 2017-04-15 RX ORDER — CLOPIDOGREL BISULFATE 75 MG/1
75 TABLET ORAL EVERY EVENING
Status: DISCONTINUED | OUTPATIENT
Start: 2017-04-15 | End: 2017-04-18 | Stop reason: HOSPADM

## 2017-04-15 RX ORDER — MAGNESIUM OXIDE 400 MG/1
400 TABLET ORAL AT BEDTIME
Status: DISCONTINUED | OUTPATIENT
Start: 2017-04-15 | End: 2017-04-18 | Stop reason: HOSPADM

## 2017-04-15 RX ORDER — ABACAVIR 300 MG/1
600 TABLET ORAL EVERY EVENING
Status: DISCONTINUED | OUTPATIENT
Start: 2017-04-15 | End: 2017-04-18 | Stop reason: HOSPADM

## 2017-04-15 RX ORDER — HYDRALAZINE HYDROCHLORIDE 25 MG/1
25 TABLET, FILM COATED ORAL DAILY
Status: DISCONTINUED | OUTPATIENT
Start: 2017-04-15 | End: 2017-04-18 | Stop reason: HOSPADM

## 2017-04-15 RX ORDER — LOSARTAN POTASSIUM 100 MG/1
100 TABLET ORAL AT BEDTIME
Status: DISCONTINUED | OUTPATIENT
Start: 2017-04-15 | End: 2017-04-18 | Stop reason: HOSPADM

## 2017-04-15 RX ORDER — MYCOPHENOLATE MOFETIL 250 MG/1
500 CAPSULE ORAL 2 TIMES DAILY
Status: DISCONTINUED | OUTPATIENT
Start: 2017-04-15 | End: 2017-04-18 | Stop reason: HOSPADM

## 2017-04-15 RX ORDER — ALBUTEROL SULFATE 0.83 MG/ML
2.5 SOLUTION RESPIRATORY (INHALATION) ONCE
Status: COMPLETED | OUTPATIENT
Start: 2017-04-15 | End: 2017-04-15

## 2017-04-15 RX ORDER — PROCHLORPERAZINE 25 MG
12.5 SUPPOSITORY, RECTAL RECTAL EVERY 12 HOURS PRN
Status: DISCONTINUED | OUTPATIENT
Start: 2017-04-15 | End: 2017-04-18 | Stop reason: HOSPADM

## 2017-04-15 RX ORDER — DAPSONE 25 MG/1
100 TABLET ORAL AT BEDTIME
Status: DISCONTINUED | OUTPATIENT
Start: 2017-04-15 | End: 2017-04-18 | Stop reason: HOSPADM

## 2017-04-15 RX ORDER — LIDOCAINE 40 MG/G
CREAM TOPICAL
Status: DISCONTINUED | OUTPATIENT
Start: 2017-04-15 | End: 2017-04-18 | Stop reason: HOSPADM

## 2017-04-15 RX ADMIN — DOLUTEGRAVIR SODIUM 50 MG: 50 TABLET, FILM COATED ORAL at 22:30

## 2017-04-15 RX ADMIN — RITONAVIR 100 MG: 100 CAPSULE ORAL at 22:30

## 2017-04-15 RX ADMIN — ACETAMINOPHEN 1000 MG: 500 TABLET, FILM COATED ORAL at 09:20

## 2017-04-15 RX ADMIN — ISOSORBIDE MONONITRATE 60 MG: 60 TABLET, EXTENDED RELEASE ORAL at 20:15

## 2017-04-15 RX ADMIN — ABACAVIR 600 MG: 300 TABLET, FILM COATED ORAL at 22:32

## 2017-04-15 RX ADMIN — GABAPENTIN 600 MG: 300 CAPSULE ORAL at 20:15

## 2017-04-15 RX ADMIN — DAPSONE 100 MG: 25 TABLET ORAL at 22:30

## 2017-04-15 RX ADMIN — CALCIUM ACETATE 2001 MG: 667 CAPSULE ORAL at 18:03

## 2017-04-15 RX ADMIN — SODIUM CHLORIDE 250 ML: 9 INJECTION, SOLUTION INTRAVENOUS at 14:30

## 2017-04-15 RX ADMIN — MYCOPHENOLATE MOFETIL 500 MG: 250 CAPSULE ORAL at 20:15

## 2017-04-15 RX ADMIN — CLOPIDOGREL 75 MG: 75 TABLET, FILM COATED ORAL at 20:15

## 2017-04-15 RX ADMIN — ALBUTEROL SULFATE 2.5 MG: 2.5 SOLUTION RESPIRATORY (INHALATION) at 09:47

## 2017-04-15 RX ADMIN — Medication 1 CAPSULE: at 20:15

## 2017-04-15 RX ADMIN — DARUNAVIR 800 MG: 800 TABLET, FILM COATED ORAL at 22:30

## 2017-04-15 RX ADMIN — MAGNESIUM OXIDE TAB 400 MG (241.3 MG ELEMENTAL MG) 400 MG: 400 (241.3 MG) TAB at 22:30

## 2017-04-15 RX ADMIN — EPOETIN ALFA 11000 UNITS: 3000 SOLUTION INTRAVENOUS; SUBCUTANEOUS at 14:37

## 2017-04-15 RX ADMIN — ATORVASTATIN CALCIUM 40 MG: 40 TABLET, FILM COATED ORAL at 22:30

## 2017-04-15 RX ADMIN — OMEPRAZOLE 40 MG: 20 CAPSULE, DELAYED RELEASE ORAL at 18:03

## 2017-04-15 RX ADMIN — SODIUM CHLORIDE 250 ML: 9 INJECTION, SOLUTION INTRAVENOUS at 14:31

## 2017-04-15 RX ADMIN — DOXERCALCIFEROL 1.5 MCG: 2 INJECTION, SOLUTION INTRAVENOUS at 14:35

## 2017-04-15 RX ADMIN — LOSARTAN POTASSIUM 100 MG: 100 TABLET, FILM COATED ORAL at 22:30

## 2017-04-15 RX ADMIN — Medication 1000 MG: at 09:20

## 2017-04-15 RX ADMIN — HYDRALAZINE HYDROCHLORIDE 25 MG: 25 TABLET ORAL at 18:08

## 2017-04-15 RX ADMIN — SODIUM CHLORIDE 500 ML: 9 INJECTION, SOLUTION INTRAVENOUS at 08:54

## 2017-04-15 ASSESSMENT — ENCOUNTER SYMPTOMS
COUGH: 1
FEVER: 0
ABDOMINAL PAIN: 0
FATIGUE: 1

## 2017-04-15 NOTE — PHARMACY-ADMISSION MEDICATION HISTORY
Admission medication history interview status for the 4/15/2017 admission is complete. See Epic admission navigator for allergy information, pharmacy, prior to admission medications and immunization status.     Medication history interview sources:  patient    Changes made to PTA medication list (reason)  Added: hydralazine (prn), losartan (prn)  Deleted: none  Changed: amlodipine (taking prn), hydralazine (taking once daily),     Additional medication history information (including reliability of information, actions taken by pharmacist):  -Patient has dialysis on Tues, Thurs, and Sat  -iron sucrose: patient unsure which day he received iron sucrose injection  -hydrocortisone: last needed to use 5-6 months ago when having hemorrhoid  -most recent prn hydralazine and losartan doses unknown      Prior to Admission medications    Medication Sig Last Dose Taking? Auth Provider   amLODIPine (NORVASC) 5 MG tablet Take 5 mg by mouth daily as needed (only uses when blood pressure greater than 150-160) 4/14/2017 at Unknown time Yes Unknown, Entered By History   HYDRALAZINE HCL PO Take 25 mg by mouth daily 4/14/2017 at Unknown time Yes Unknown, Entered By History   HYDRALAZINE HCL PO Take 25 mg by mouth 2 times daily as needed for high blood pressure  Yes Unknown, Entered By History   LOSARTAN POTASSIUM PO Take 100 mg by mouth daily as needed (with breakfast)  Yes Unknown, Entered By History   albuterol (PROAIR HFA/PROVENTIL HFA/VENTOLIN HFA) 108 (90 BASE) MCG/ACT Inhaler Inhale 2 puffs into the lungs every 6 hours as needed for shortness of breath / dyspnea or wheezing 4/14/2017 at Unknown time Yes eNlson Worthy MD   guaiFENesin-dextromethorphan (ROBITUSSIN DM) 100-10 MG/5ML syrup Take 5 mLs by mouth every 4 hours as needed for cough 4/14/2017 at Unknown time Yes Nelson Worthy MD   Nutritional Supplements (NEPRO PO) 1-3 times daily 4/14/2017 at Unknown time Yes Unknown, Entered By History   omeprazole  (PRILOSEC) 40 MG capsule Take 40 mg by mouth daily 1 hour before dinner 4/14/2017 at Unknown time Yes Unknown, Entered By History   B complex-C-folic acid (NEPHROCAPS/TRIPHROCAPS) 1 MG capsule Take 1 capsule by mouth every evening 4/14/2017 at Unknown time Yes Unknown, Entered By History   insulin lispro (HUMALOG KWIKPEN) 100 UNIT/ML injection Inject 5 Units Subcutaneous 3 times daily (before meals) 4/14/2017 at Unknown time Yes Lorna Jhaveri MD   insulin lispro (HUMALOG KWIKPEN) 100 UNIT/ML injection Inject Subcutaneous 3 times daily (before meals) Sliding scale - 2 units per 50 over 150 4/14/2017 at Unknown time Yes Unknown, Entered By History   Multiple Vitamins-Minerals (DIALYVITE 800/ULTRA D) TABS Take 1 tablet by mouth daily 4/14/2017 at Unknown time Yes Unknown, Entered By History   mycophenolate (CELLCEPT - GENERIC EQUIVALENT) 500 MG tablet Take 500 mg by mouth 2 times daily On an empty stomach 4/14/2017 at Unknown time Yes Reported, Patient   isosorbide mononitrate (IMDUR) 30 MG 24 hr tablet Take 2 tablets (60 mg) by mouth every evening 4/14/2017 at Unknown time Yes Glenna Fernández MD   losartan (COZAAR) 100 MG tablet Take 1 tablet (100 mg) by mouth At Bedtime 4/14/2017 at Unknown time Yes Glenna Fernández MD   carvedilol (COREG) 12.5 MG tablet Take 1 tablet (12.5 mg) by mouth 2 times daily (with meals) 4/14/2017 at Unknown time Yes Glenna Fernández MD   gabapentin (NEURONTIN) 300 MG capsule Take 600 mg by mouth every evening 4/14/2017 at Unknown time Yes Unknown, Entered By History   insulin glargine (LANTUS) 100 UNIT/ML PEN Inject 50 Units Subcutaneous every morning 4/14/2017 at Unknown time Yes Unknown, Entered By History   nystatin (MYCOSTATIN) cream Apply topically daily as needed for dry skin 4/14/2017 at Unknown time Yes Reported, Patient   imiquimod (ALDARA) 5 % cream Apply topically as needed Past Week at Unknown time Yes Reported, Patient   DOXERCALCIFEROL IV Inject 6 mcg into the vein three  "times a week (with dialysis) 4/13/2017 Yes Reported, Patient   CLONAZEPAM PO Take 0.5 mg by mouth nightly as needed for anxiety (restless legs) Past Month at Unknown time Yes Unknown, Entered By History   CLOPIDOGREL BISULFATE PO Take 75 mg by mouth every evening  4/14/2017 at Unknown time Yes Unknown, Entered By History   abacavir (ZIAGEN) 300 MG tablet Take 600 mg by mouth every evening  4/14/2017 at Unknown time Yes Abstract, Provider   calcium acetate (PHOSLO) 667 MG CAPS 2,001 mg 3 times daily (with meals) Take 3 capsules three times a day with meals 4/14/2017 at Unknown time Yes Abstract, Provider   chlorhexidine (CHLORHEXIDINE) 0.12 % solution Rinse and gargle 15 ml by mouth twice a day as directed. 4/14/2017 at Unknown time Yes Abstract, Provider   hydrocortisone (ANUCORT-HC) 25 MG suppository Place 25 mg rectally 2 times daily as needed   Yes Abstract, Provider   terbinafine (LAMISIL AT) 1 % cream Apply topically 2 times daily as needed  Past Month at Unknown time Yes Abstract, Provider   atorvastatin (LIPITOR) 40 MG tablet Take 40 mg by mouth At Bedtime 4/14/2017 at Unknown time Yes Unknown, Entered By History   epoetin brittni (EPOGEN,PROCRIT) 65975 UNIT/ML injection Inject 11,000 Units Subcutaneous three times a week WITH DIALYSIS 4/13/2017 Yes Unknown, Entered By History   iron sucrose (VENOFER) 20 MG/ML injection Inject 50 mg into the vein once a week WIth dialysis  Yes Unknown, Entered By History   MAGNESIUM OXIDE PO Take 400 mg by mouth At Bedtime 4/14/2017 at Unknown time Yes Unknown, Entered By History   dolutegravir (TIVICAY) 50 MG tablet Take 50 mg by mouth At Bedtime 4/14/2017 at Unknown time Yes Unknown, Entered By History   nitroglycerin (NITROSTAT) 0.4 MG SL tablet One tablet under the tongue every 5 minutes if needed for chest pain. May repeat every 5 minutes for a maximum of 3 doses in 15 minutes\"  Yes Lay Paz MD   gabapentin (NEURONTIN) 300 MG capsule Take 300 mg by mouth every morning "  4/14/2017 at Unknown time Yes Unknown, Entered By History   dapsone 100 MG tablet Take 100 mg by mouth At Bedtime  4/14/2017 at Unknown time Yes Reported, Patient   Darunavir Ethanolate (PREZISTA PO) Take 800 mg by mouth At Bedtime. 4/14/2017 at Unknown time Yes Reported, Patient   ritonavir (NORVIR) 100 MG capsule Take 1 capsule by mouth At Bedtime  4/14/2017 at Unknown time Yes Reported, Patient   order for DME Equipment being ordered: Other: 4WW  Treatment Diagnosis: Decreased activity tolerance   Lorna Jhaveri MD         Medication history completed by: Chiquita Olivera, intern

## 2017-04-15 NOTE — H&P
CHIEF COMPLAINT:  Generalized weakness and bradycardia.      HISTORY OF PRESENT ILLNESS:  Mr. Donald Raza is a 67-year-old gentleman with a complex past medical history including end-stage renal disease on hemodialysis on Tuesday, Thursday, and Saturday, last dialysis was on Thursday history of HIV on antiretroviral medications; he was discharged from this hospital on 04/11 after 2 weeks stay for pneumonia, influenza A and anemia of unknown etiology requiring 6 packs of RBC.      The patient today presented to the emergency room by ambulance for generalized weakness.  This morning at 6 patient woke up planning to go for his hemodialysis, he felt generally weak and also felt there is heavy sensation on his chest and shoulder.  He could not get up and get ready for dialysis due to weakness and he called 911 service and brought in by ambulance.  The patient denied any nausea, no cough, no shortness of breath.  He denied any abdominal pain, no diarrhea or constipation.  The patient has on and off cough but overall it is getting better from the time of discharge.  He had heaviness on his shoulder which he felt when he had a non-ST elevation myocardial infarction in the past.  The patient continued to produce a little bit of urine.  The patient also found to be bradycardic, his heart rate was in the high 30s and low 40s and placed on transcutaneous pacer and admitted to the hospital.      PAST MEDICAL HISTORY:   1.  Diabetes mellitus type 2.   2.  History of transient ischemia attack.   3.  Non-ST elevation myocardial, with PCI.   4.  End-stage renal disease on hemodialysis.   5.  Coronary artery disease.   6.  Hyperlipidemia.   7.  Hypertension.   8.  History of healthcare-associated pneumonia.   9.  Influenza A.   10.  Human immunodeficiency virus without significant opportunistic infection.   11.  Anemia requiring 6 units of packed red blood cells transfusion last admission.      PAST SURGICAL HISTORY:   Cholecystectomy, appendectomy, history of colostomy in 1999 and angioplasty in 2016.      FAMILY HISTORY:  Brother with history of CABG and heart disease.  Sister with history of coronary artery disease and hypertension.      SOCIAL HISTORY:  The patient is accompanied by his brother.  .  Former smoker, does not drink alcohol, does not use illicit drugs.      REVIEW OF SYSTEMS:  Ten points reviewed, all are negative except those mentioned in history of present illness.      ALLERGIES:  Lisinopril, sulfa.      PHYSICAL EXAMINATION:   GENERAL:  The patient is awake, alert, oriented, comfortable not in any form of distress.   VITAL SIGNS:  Blood pressure 143/75, pulse rate 67, temperature 97, oxygen saturation 98%.   HEENT:  Pink nonicteric, extraocular muscle movement intact.   NECK:  Supple, no JVD, no thyromegaly.   CHEST:  Good air entry bilaterally anteriorly, basal decreased breath sounds.   CARDIOVASCULAR:  S1 and S2 were heard, no gallop or murmur, he was bradycardic earlier.  AV fistula on the left upper arm with good thrill.   ABDOMEN:  Soft, nontender, nondistended, positive bowel sounds, no organomegaly.   EXTREMITIES:  +1 bilateral lower extremity edema, pedal ankle and pretibial.   SKIN:  Warm and dry, no rash, lesion or ecchymosis.   NEURO:  Alert and oriented x3; responds to questions appropriately, no focal neurologic finding.   PSYCH:  Normal mood and affect, pleasant, keeps eye contact, responds to questions appropriately, cooperative.      DIAGNOSTIC TESTS OF INTEREST:  EKG showed marked sinus bradycardia at 39 beats per minute, QTc is 405.  Chest x-ray showed new opacity of the right lung base which represent a developing airspace disease including pneumonia but most likely it is clearing from previous pneumonia.  CT head without contrast is negative.  Sodium 138, potassium 5.1, BUN 73, creatinine 7.48, calcium 8.6, magnesium 2.8, phosphorus 6.9, albumin 3, bilirubin 0.4, ALT 22, AST 20,  alkaline phosphatase 77.  Troponin negative.  Lactic acid 1.8.  Glucose 196.  WBC 7.1, hemoglobin 7.1, platelets 114.  INR 1.  Chest x-ray as reported. Patient had echocardiogram in 01/2017 which showed ejection fraction, normal left ventricular size and normal ventricular systolic function, no regional wall motion abnormalities seen.      ASSESSMENT:  Donald Raza is a 67-year-old gentleman with history of end-stage renal disease recently had pneumonia and influenza was discharged from this hospital on 04/11/2017.  Felt weak and tired and brought in by ambulance to the emergency room found to be bradycardic and being admitted to the hospital for further inpatient evaluation and management.   1.  Sinus bradycardia.   2.  Anemia multifactorial due to end-stage renal disease and other medications.   3.  Headache.   4.  End-stage renal disease on hemodialysis on Tuesday, Thursday and Saturday.   5.  Generalized weakness and deconditioning.   6.  Diabetes mellitus type 2 on insulin.   7.  HIV infection on highly active antiretroviral drugs.      PLAN:  The patient is being admitted as an inpatient; expect at least 2 night stay in the hospital for close monitoring, he was bradycardic earlier which was significant in the 30s.  Patient has transcutaneous pace placed, atropine 0.4 mg every 5 hours x3 if needed is ordered but MD needs to be contacted prior to giving this medication.  Nephrologist is consulted for hemodialysis and cardiologist is also consulted to help manage his bradycardia, Coreg is discontinued. We will continue most of his home medication and also antiretroviral medications, patient will have his hemodialysis today.      I discussed with the patient the plan of care.  All his questions and concerns addressed, he showed understanding.      CODE STATUS:  In the event of cardiorespiratory arrest, he is full code.         SARAH MODI MD             D: 04/15/2017 15:33   T: 04/15/2017 16:39   MT: [id number]       Name:     GREGORIO BRUSH   MRN:      [id number]        Account:      [id number]   :      1950           Admitted:     [id number]      Document: [id number]

## 2017-04-15 NOTE — PROGRESS NOTES
Inpatient Dialysis Progress Note           Assessment and Plan:   ESRD, status quo.  Stable dialysis run today.  Respiratory sx's improved.  Next scheduled dialysis 4/18.  Evaluate in AM re: need for additional dialysis before then.      Bradycardia    * No resolved hospital problems. *               Interval History:   Examined during dialysis.  Readmitted with sudden weakness and dyspnea that began this AM.  Sinus bradycardia in ER.  Just discharged 4/11 after 2 week in-pt stay with Influenza A and pneumonia.  Had satisfactory dialysis at [facility] on 4/13.  Extensive medical history otherwise well documented in other recent notes.  VS improved during run.  Pulse up to 60's.  BP rising.  No fever.  Good SaO2.  Meds and labs reviewed.  Lytes ok; K+ 5.1.  BUN/Cr in good range.  Other chemistries ok, except Phos 6.9.  CBC ~same as earlier this week; Hgb 7.1, nl WBC, plts lower 114K.  CXR shows new infiltrate at right base, fluid vs infection.                   Dialysis Details:   Current weight: 96.2 kg (actual weight)  Dry Weight: TBD  Dialysis Temp: 36.5  C  Access Device: AVF  Access Site: left  Dialyzer: Optiflux 160  Dialysis Bath: K+ 2  Sodium Profile: no  UF Goal: 3-4L  Blood Flow Rate (mL/min): 450  Total Treatment Time (hrs): 3.5  Heparin: none    Medications:  EPO dose: 02571  Zemplar: 1.5mcg  IV Fe: no            Medications:       sodium chloride (PF)  3 mL Intracatheter Q8H     abacavir  600 mg Oral QPM     atorvastatin  40 mg Oral At Bedtime     B complex-C-folic acid  1 capsule Oral QPM     calcium acetate  2,001 mg Oral TID w/meals     clopidogrel (PLAVIX) tablet 75 mg  75 mg Oral QPM     darunavir  800 mg Oral At Bedtime     dolutegravir  50 mg Oral At Bedtime     [START ON 4/16/2017] gabapentin  300 mg Oral QAM     gabapentin  600 mg Oral QPM     hydrALAZINE (APRESOLINE) tablet 25 mg  25 mg Oral Daily     [START ON 4/18/2017] iron sucrose  50 mg Intravenous Weekly     isosorbide  mononitrate  60 mg Oral QPM     losartan  100 mg Oral At Bedtime     magnesium oxide (MAG-OX) tablet 400 mg  400 mg Oral At Bedtime     DIALYVITE 800/ULTRA D  1 tablet Oral Daily     mycophenolate  500 mg Oral BID     omeprazole  40 mg Oral Daily     ritonavir  100 mg Oral At Bedtime     dapsone  100 mg Oral At Bedtime     insulin aspart  1-7 Units Subcutaneous TID AC     insulin aspart  1-5 Units Subcutaneous At Bedtime     [START ON 4/16/2017] insulin glargine  40 Units Subcutaneous QAM AC     insulin aspart  6 Units Subcutaneous TID w/meals       - MEDICATION INSTRUCTIONS -                      Physical Exam:       Vital Sign Ranges  Temp:  [96.1  F (35.6  C)-97.5  F (36.4  C)] 97.5  F (36.4  C)  Pulse:  [40-68] 68  Heart Rate:  [37-47] 44  Resp:  [14-36] 14  BP: ()/(44-77) 151/77  SpO2:  [93 %-100 %] 97 %    Weight, current: 96.2 kg (actual weight)  Weight change:          Physical Exam:   General:  Patient comfortable, in no apparent distress.  Awake, alert, oriented x3.  Neck:  Supple, no JVD.  Lungs:  Few crackles to auscultation bilaterally.  Cardiac:  Regular rate and rhythm, no murmurs, rub, or gallops.  Abdomen:  Soft, nontender, physiologic sounds.  Extremities:  Without edema.  2+ pulses.  Skin:  Warm, dry.  Neurologic:  No focal deficits.             Data:        Lab Results   Component Value Date     04/15/2017    Lab Results   Component Value Date    CHLORIDE 99 04/15/2017    Lab Results   Component Value Date    BUN 73 (H) 04/15/2017      Lab Results   Component Value Date    POTASSIUM 5.1 04/15/2017    Lab Results   Component Value Date    CO2 26 04/15/2017    Lab Results   Component Value Date    CR 7.48 (H) 04/15/2017        Lab Results   Component Value Date     04/15/2017     04/11/2017     04/10/2017     Lab Results   Component Value Date    POTASSIUM 5.1 04/15/2017    POTASSIUM 4.8 04/11/2017    POTASSIUM 4.5 04/10/2017     Lab Results   Component Value Date     CHLORIDE 99 04/15/2017    CHLORIDE 101 04/11/2017    CHLORIDE 99 04/10/2017     Lab Results   Component Value Date    CO2 26 04/15/2017    CO2 24 04/11/2017    CO2 27 04/10/2017     Lab Results   Component Value Date    CR 7.48 (H) 04/15/2017    CR 8.39 (H) 04/11/2017    CR 6.95 (H) 04/10/2017     Lab Results   Component Value Date    BUN 73 (H) 04/15/2017     (H) 04/11/2017     (H) 04/10/2017     Lab Results   Component Value Date    HGB 7.1 (L) 04/15/2017    HGB 7.3 (L) 04/11/2017    HGB 7.4 (L) 04/10/2017     Lab Results   Component Value Date    PH 7.38 01/07/2017    PO2 262 (H) 01/07/2017    PCO2 42 01/07/2017    HCO3 25 01/07/2017    SARANYA 2.5 01/06/2017           Pankaj Hamilton MD  Nephrology; [employer] [employer]  [phone]

## 2017-04-15 NOTE — CONSULTS
See today's dialysis note and notes from my group from recent admission which concluded 4/11/17.    Pankaj Hamilton MD  Nephrology; [employer] [employer]  [phone]

## 2017-04-15 NOTE — PROVIDER NOTIFICATION
Dr Johnson notified of pt low HR.  Hr at it's lowest was 33 and highest 44. BP also low. Pt very sleepy.  He states he is very tired and weak. could not make it to dialysis today. Dr Johnson came up to see the pt.

## 2017-04-15 NOTE — ED PROVIDER NOTES
History     Chief Complaint:  Generalized Weakness      KELSEY Raza is a 67 year old male with a complex medical history including end stage renal disease on dialysis T/Thur/Sat (last dialyzed Thur), HIV, and recently discharged on 4/11 after two week stay for pneumonia, influenza, and anemia of unknown etiology requiring 6 packed RBCs.  The patient presents today via EMS with generalized weakness. He describes this morning at 0600, 2 hours ago, he awoke feeling generally weak with a heavy sensation in his shoulders. He was unable to do his normal morning activities due to his weakness, including tying his shoes, therefore called 911 services. The patient denies nausea, fevers, vomiting abdominal pain, chest pain, shortness of breath, or any associated symptoms. He continues to cough but states it has not been worsening or productive. Regarding his shoulder heaviness, the patient does note for previous NSTEMI he presented with symptoms of chest heaviness and back pain. Patient continues to make some urine but notes has had 4 softer than usual stools this morning. Some increased leg swelling recently. He denies history of bradycardia.    The patient also reports of a headache. He states he typically does not get headaches and when he does they are never this severe.       Allergies:  Lisinopril   Sulfa Drugs       Medications:    Insulin   Multiple Vitamins Minerals   Cellcept  Omega 3 Fish  Imdur  Appresoline  Cozaar   Coreg  Norvasc  Gabapentin   Lantus   Mycostatin   Aspirin  Clonazepam   Clopidogrel Bisulfate  Ziagen   Phoslol   Chlorhexidine   Anucort   Lamisil   Lipitor   Epoetin Dwayne  Venofer  Magnesium oxide  Tivicay   Nitroglycerin   Dapsone   Prezista  Norvir      Past Medical History:    Type 2 diabetes mellitus   HIV   Allergic rhinitis  Impotence of organic origin   Huang disease  Hypertension   CKD   Mixed hyperlipidemia   CAD  NSTEMI   Pulmonary hypertension   Near syncope   TIA  Increased  "prostate specific antigen velocity   Bilateral pneumonia   Prostate cancer  Colon cancer       Past Surgical History:   Cholecystectomy, laporoscopic   Colostomy (1999)   Appendectomy   Angiogram (2000, 2016)   Stent, coronary (2015 x2)  Heart cath, angioplasty (2016)       Family History:   Brother - Heart Disease, CABG, Dilated Aorta  Sister - Kidney Disease, Hypertension       Social History:  The patient was accompanied to the ED by his daughter.  Smoking Status: Former Smoker  Alcohol Use: Negative  Marital Status:      Review of Systems   Constitutional: Positive for fatigue (generalized weakness). Negative for fever.   Respiratory: Positive for cough.    Cardiovascular: Positive for leg swelling. Negative for chest pain.   Gastrointestinal: Negative for abdominal pain.   All other systems reviewed and are negative.    Physical Exam     Patient Vitals for the past 24 hrs:   BP Temp Temp src Pulse Heart Rate Resp SpO2 Height Weight   04/15/17 1121 - - - - 45 - 97 % - -   04/15/17 1120 110/61 - - - 46 - 98 % - -   04/15/17 1100 108/77 - - - 45 - 99 % - -   04/15/17 1050 115/64 - - - 45 - 100 % - -   04/15/17 1040 113/65 - - - - - - - -   04/15/17 1030 113/65 - - - 43 - 100 % - -   04/15/17 1020 110/60 - - - 44 - 98 % - -   04/15/17 1010 108/63 - - - - - - - -   04/15/17 1000 111/63 - - - 42 - 98 % - -   04/15/17 0950 109/63 - - - - - - - -   04/15/17 0947 - - - - 47 20 99 % - -   04/15/17 0940 112/65 - - - 42 - 99 % - -   04/15/17 0930 110/62 - - - 42 - - - -   04/15/17 0920 103/67 - - - 40 - 94 % - -   04/15/17 0850 91/71 - - - - - 93 % - -   04/15/17 0840 93/62 - - - - - - - -   04/15/17 0830 93/51 - - - - - - - -   04/15/17 0804 91/54 97.3  F (36.3  C) Oral (!) 40 - (!) 36 95 % 1.803 m (5' 11\") 90.7 kg (200 lb)        Physical Exam   General: Elderly male appearing older than stated age  Eyes: PERRL, Pale pink conjunctiva  ENT: Moist mucous membranes, oropharynx clear.   CV: Normal S1S2, no murmur, " rub or gallop. Bradycardic, AV fistula left upper arm with palpable thrill, right radial pulse palpable.  Resp: Expiratory wheezes and crackles in bases bilaterally, left greater than right. Normal work of breathing. Speaks in full sentences.  GI: Abdomen is soft, nontender and nondistended. No palpable masses. No rebound or guarding.  MSK: 1+ pitting edema bilaterally to distal lower extremities. Nontender on palpation of back and shoulder. Normal active range of motion.  Skin: Warm and dry. No rashes or lesions or ecchymoses on visible skin.  Neuro: Alert and oriented. Responds appropriately to all questions and commands. No focal findings appreciated. Normal muscle tone.  Psych: Normal mood and affect. Pleasant.    Emergency Department Course   ECG:   Completed at 0808.  Read at 0810.   Rate 39 bpm. MI interval 168. QRS duration 88. QT/QTc 304/405. P-R-T axes 42 44 62. Marked sinus bradycardia, abnormal ECG     Imaging:  Radiographic findings were communicated with the patient who voiced understanding of the findings.    Chest X-ray (PA & LAT):    New opacity at the right lung base. This could represent  developing airspace disease including pneumonia. Left chest is  obscured by an overlying object projected over the left mid to low  chest. Borderline cardiomegaly appears larger. Vascular congestion  appears stable.  Results per radiology.     CT Head w/o contrast:   No acute intracranial pathology.  Results per radiology.    Laboratory:  0824 ISTAT BVGL: pH 7.42, PCO2 40, PO2 24 (L), Bicarb 26, O2 Sat 45, Lactic 1.8.  POCT Troponin: 0.02   CBC: HGB 7.1 (L),  (L), ow wnl (WBC 7.1)  CMP:  (H), ANETA 73 (H), Creat 7.48 (H), Alb 3.0 (L), Prot tot 6.5 (L), ow wnl  0819 Troponin: 0.0159  Magnesium: 2.8 (H)   Phosphorus: 6.9 (H)   BNP: 37139 (H)   INR: 1.09  PTT: 32  Type and screen: O+, antibody neg    Interventions:  0.9% sodium chloride infusion 500 mL  Tylenol 1000 mg tablet  Albuterol neb solution 2.5  mg    Emergency Department Course:  Nursing notes and vitals reviewed.  I performed an exam of the patient as documented above.   The work up above was undertaken.     The patient received the intervention(s) above.     Patient reassessed. Requests tylenol for his headache. Denies any worsening symptoms. Denies chest pain.    Findings and plan explained to the Patient who consents to admission. Discussed the patient with the hospitalist service, who will admit the patientfor further monitoring, evaluation, and treatment.     Impression & Plan    Medical Decision Making:  Donald Raza is a 67 year old male with end stage renal disease on dialysis who presents to the emergency department with concerns for generalized weakness and shoulder tightness. Multiple etiologies considered on presentation including ACS, CHF, electrolyte abnormality, infection, and symptoms related to bradycardia as he was in sinus bradycardia on presentation. This is not a known history by him on past or by report but the beta blocker (carvedilol) use is likely contributing. He was noted to be hyperphosphoremic and slightly hypermagnesemic, probably secondary to his renal disease with dialysis scheduled for today. He is anemic as well and requires 6 transfusions on recent hospitalization. He is not below 7 and I do not think emergent transfusion was indicated. He had a new headache which in light of his ESRD, I considered worrisome for possible bleed however he had no neurologic abnormalities. Head CT was normal and I do not believe he needs a lumbar puncture. Infection seemed unlikely given lack of leukocytosis or fever. He does have a possibility of a right lung infiltrate which is new since previous but clinically does not exhibit other symptoms of pneumonia with a cough that has not change or worsened over past week. Antibiotics were avoided at this time. I do believe the patient needs admission for ongoing bradycardia likely contributing  to his symptoms. Initial troponin was normal but will require trending given short duration of his symptoms. He verbalized agreement and understanding of the plan. All questions were answered and he was admitted in stable condition. Hemodialysis arranged for later today.    Diagnosis:    ICD-10-CM   1. Sinus bradycardia R00.1   2. Anemia, unspecified type D64.9   3. Acute nonintractable headache, unspecified headache type R51   4. Generalized muscle weakness M62.81   5. ESRD (end stage renal disease) on dialysis (H) N18.6    Z99.2       Disposition:  Admission by Dr. Johnson  to a telemetry bed.    Iban BARRERA, am serving as a scribe at 11:02 AM on 4/15/2017 to document services personally performed by Dr. Castro, based on my observations and the provider's statements to me.    [facility] EMERGENCY DEPARTMENT       Jill Castro MD  04/15/17 5001

## 2017-04-15 NOTE — IP AVS SNAPSHOT
[place] [address] [facility]    [address]    [facility] [address]    Phone:  [phone]    Fax:  [phone]                                       After Visit Summary   4/15/2017    Donald Raza    MRN: [id number]           After Visit Summary Signature Page     I have received my discharge instructions, and my questions have been answered. I have discussed any challenges I see with this plan with the nurse or doctor.    ..........................................................................................................................................  Patient/Patient Representative Signature      ..........................................................................................................................................  Patient Representative Print Name and Relationship to Patient    ..................................................               ................................................  Date                                            Time    ..........................................................................................................................................  Reviewed by Signature/Title    ...................................................              ..............................................  Date                                                            Time

## 2017-04-15 NOTE — ED NOTES
Patient presents complaining of generalized weakness, chest heaviness, and shortness of breath that started upon awakening this morning. He arrives awake, weak, dyspneic on exertion, and tachypneic. He reports he was scheduled for dialysis this morning but felt too ill to go.

## 2017-04-15 NOTE — LETTER
Transition Communication Hand-off for Care Transitions to Next Level of Care Provider    Name: Donald Raza  : 1950  MRN #: [id number]  Reason for Hospitalization:  Sinus bradycardia [R00.1]  Generalized muscle weakness [M62.81]  ESRD (end stage renal disease) on dialysis (H) [N18.6, Z99.2]  Acute nonintractable headache, unspecified headache type [R51]  Anemia, unspecified type [D64.9]  Admit Date/Time: 4/15/2017  8:01 AM  Discharge Date:  2017    Reason for Communication Hand-off Referral: Fragility  Other Bradycardia    Discharge Plan:  Discharged to: Home with support                   Patient agreeable to post-hospital support suggestions:  Yes  Discharge Plan:   Home with support           Patient is on new medications:   No           MTM follow up recommended: Yes           Tel-Assurance program:  Declined          Follow-up specialty is recommended: Yes. Follow up with [facility] as scheduled. Also, Medication Therapy Management Referral has been made.   Follow-up plan:  Future Appointments  Date Time Provider Department Center   5/15/2017 8:45 AM Arnoldo Diaz MD [facility]   2017 8:40 AM Hal Rodriguez MD [facility]     Any outstanding tests or procedures:  No.       Referrals     Future Labs/Procedures    Follow-Up with Cardiac Advanced Practice Provider           Key Recommendations:  Patient was discharged on a regular dialysis diet with Nepro Oral Supplements between meals. Please assess his compliance. Also, his insulin dose was decreased while hospitalized. Lantus is now 36 units every AM. Once his eating habits resume, he may need to increase it again. Please assess this. His Coreg was stopped d/t bradycardia. He was started on Amlodipine 2.5mg daily instead of Coreg. He will need to be placed with an event monitor for his heart rate.     Communicated handoff via EPIC Comm Mg to Dr. Aida Thomas's CC at [phone].      Ruby Hinojosa RN, BSN, [facility]  [facility]  [phone]    AVS/Discharge Summary is the source of truth; this is a helpful guide for improved communication of patient story

## 2017-04-15 NOTE — IP AVS SNAPSHOT
MRN:[id number]                      After Visit Summary   4/15/2017    Donald Raza    MRN: [id number]           Thank you!     Thank you for choosing [facility] for your care. Our goal is always to provide you with excellent care. Hearing back from our patients is one way we can continue to improve our services. Please take a few minutes to complete the written survey that you may receive in the mail after you visit. If you would like to speak to someone directly about your visit please contact Patient Relations at [phone]. Thank you!          Patient Information     Date Of Birth          1950        Designated Caregiver       Most Recent Value    Caregiver    Will someone help with your care after discharge? no      About your hospital stay     You were admitted on:  April 15, 2017 You last received care in the:  [place] [address] [facility]    You were discharged on:  April 18, 2017        Reason for your hospital stay       You were hospitalized for low heart rate.                  Who to Call     For medical emergencies, please call 911.  For non-urgent questions about your medical care, please call your primary care provider or clinic, [phone]          Attending Provider     Provider Specialty    Jill Castro MD Emergency Medicine    [facility]       Primary Care Provider Office Phone # Fax #    Aida Thomas [phone]       [facility] [address] [place] [address]        After Care Instructions     Activity       Your activity upon discharge: activity as tolerated            Diet       Follow this diet upon discharge: Orders Placed This Encounter      Snacks/Supplements Adult: Nepro Oral Supplement; Between Meals      Combination Diet Regular Diet Adult; Dialysis Diet                  Follow-up Appointments     Follow-up and recommended labs and tests        Follow up  with Cardiology in clinic within one week.                  Your next 10 appointments already scheduled     May 15, 2017  8:45 AM CDT   Return Visit with Arnoldo Diaz MD   [facility] [place] ([facility])    [address]  [place] [address] [phone]            May 16, 2017  8:40 AM CDT   (Arrive by 8:25 AM)   RETURN WAIT LIST with Hal Rodriguez MD   [facility] ([facility])    [address]  [address] [facility] [address] [phone]              Additional Services     MED THERAPY MANAGE REFERRAL       Multiple chronic mediations.            Follow-Up with Cardiac Advanced Practice Provider                 Future tests that were ordered for you     Cardiac Event Monitor - Peds/Adult       At discharge                  Further instructions from your care team       1. Your insulin dose was decreased while you were hospitalized.  Your Lantus was decreased to 36 units every morning.  Once you are home your eating habits may change and you may need to increase your Lantus back to your normal dose.  Please follow up with your primary care doctor in the next few weeks to discuss this.  2. Follow up with Cardiology in clinic in the next week.  You will need to have an event monitor to monitor your heart rate.  3. Your Carvedilol was stopped.  Do not start taking this again as it will cause a low heart rate.  4. You should take Amlodipine 2.5 mg every day. (instead of as needed)    Pending Results     No orders found from 4/13/2017 to 4/16/2017.            Statement of Approval     Ordered          04/18/17 1139  I have reviewed and agree with all the recommendations and orders detailed in this document.  EFFECTIVE NOW     Approved and electronically signed by:  Clemencia Pal MD             Admission Information     Date & Time Provider Department Dept. Phone    4/15/2017 Isidro Johnson  "MD [facility] [place] [address] [facility] [phone]      Your Vitals Were     Blood Pressure Pulse Temperature Respirations Height Weight    153/79 63 98  F (36.7  C) 16 1.803 m (5' 11\") 89.7 kg (197 lb 12 oz)    Pulse Oximetry BMI (Body Mass Index)                96% 27.58 kg/m2          MyChart Information     [facility] lets you send messages to your doctor, view your test results, renew your prescriptions, schedule appointments and more. To sign up, go to [email or web address] . Click on \"Log in\" on the left side of the screen, which will take you to the Welcome page. Then click on \"Sign up Now\" on the right side of the page.     You will be asked to enter the access code listed below, as well as some personal information. Please follow the directions to create your username and password.     Your access code is: [email or web address]  Expires: 2017 10:31 AM     Your access code will  in 90 days. If you need help or a new code, please call your [facility] clinic or [phone].        Care EveryWhere ID     This is your Care EveryWhere ID. This could be used by other organizations to access your [facility] medical records  [id number]           Review of your medicines      CONTINUE these medicines which may have CHANGED, or have new prescriptions. If we are uncertain of the size of tablets/capsules you have at home, strength may be listed as something that might have changed.        Dose / Directions    * amLODIPine 5 MG tablet   Commonly known as:  NORVASC   This may have changed:  Another medication with the same name was added. Make sure you understand how and when to take each.        Dose:  5 mg   Take 5 mg by mouth daily as needed (only uses when blood pressure greater than 150-160)   Refills:  0       * amLODIPine 2.5 MG tablet   Commonly known as:  NORVASC   This may have changed:  You were already taking a medication with the same name, and this prescription was added. Make sure you understand " how and when to take each.   Used for:  Hypertension secondary to other renal disorders        Dose:  2.5 mg   Take 1 tablet (2.5 mg) by mouth daily   Quantity:  30 tablet   Refills:  0       insulin glargine 100 UNIT/ML injection   Commonly known as:  LANTUS   This may have changed:  how much to take   Used for:  Type 2 diabetes mellitus with chronic kidney disease on chronic dialysis, unspecified long term insulin use status (H)        Dose:  36 Units   Inject 36 Units Subcutaneous every morning   Refills:  0       * Notice:  This list has 2 medication(s) that are the same as other medications prescribed for you. Read the directions carefully, and ask your doctor or other care provider to review them with you.      CONTINUE these medicines which have NOT CHANGED        Dose / Directions    abacavir 300 MG tablet   Commonly known as:  ZIAGEN        Dose:  600 mg   Take 600 mg by mouth every evening   Refills:  0       albuterol 108 (90 BASE) MCG/ACT Inhaler   Commonly known as:  PROAIR HFA/PROVENTIL HFA/VENTOLIN HFA   Used for:  Cough        Dose:  2 puff   Inhale 2 puffs into the lungs every 6 hours as needed for shortness of breath / dyspnea or wheezing   Quantity:  1 Inhaler   Refills:  1       ANUCORT-HC 25 MG Suppository   Generic drug:  hydrocortisone        Dose:  25 mg   Place 25 mg rectally 2 times daily as needed   Refills:  0       atorvastatin 40 MG tablet   Commonly known as:  LIPITOR        Dose:  40 mg   Take 40 mg by mouth At Bedtime   Refills:  0       B complex-C-folic acid 1 MG capsule        Dose:  1 capsule   Take 1 capsule by mouth every evening   Refills:  0       calcium acetate 667 MG Caps capsule   Commonly known as:  PHOSLO        Dose:  2001 mg   2,001 mg 3 times daily (with meals) Take 3 capsules three times a day with meals   Refills:  0       chlorhexidine 0.12 % solution   Commonly known as:  PERIDEX        Rinse and gargle 15 ml by mouth twice a day as directed.   Refills:  0        CLONAZEPAM PO        Dose:  0.5 mg   Take 0.5 mg by mouth nightly as needed for anxiety (restless legs)   Refills:  0       CLOPIDOGREL BISULFATE PO        Dose:  75 mg   Take 75 mg by mouth every evening   Refills:  0       dapsone 100 MG tablet        Dose:  100 mg   Take 100 mg by mouth At Bedtime   Refills:  0       DIALYVITE 800/ULTRA D Tabs        Dose:  1 tablet   Take 1 tablet by mouth daily   Refills:  0       dolutegravir 50 MG tablet   Commonly known as:  TIVICAY        Dose:  50 mg   Take 50 mg by mouth At Bedtime   Refills:  0       DOXERCALCIFEROL IV        Dose:  6 mcg   Inject 6 mcg into the vein three times a week (with dialysis)   Refills:  0       epoetin brittni 24329 UNIT/ML injection   Commonly known as:  EPOGEN/PROCRIT        Dose:  02760 Units   Inject 11,000 Units Subcutaneous three times a week WITH DIALYSIS   Refills:  0       * gabapentin 300 MG capsule   Commonly known as:  NEURONTIN        Dose:  300 mg   Take 300 mg by mouth every morning   Refills:  0       * gabapentin 300 MG capsule   Commonly known as:  NEURONTIN        Dose:  600 mg   Take 600 mg by mouth every evening   Refills:  0       guaiFENesin-dextromethorphan 100-10 MG/5ML syrup   Commonly known as:  ROBITUSSIN DM   Used for:  Cough        Dose:  5 mL   Take 5 mLs by mouth every 4 hours as needed for cough   Quantity:  118 mL   Refills:  0       * HumaLOG KWIKpen 100 UNIT/ML injection   Generic drug:  insulin lispro        Inject Subcutaneous 3 times daily (before meals) Sliding scale - 2 units per 50 over 150   Refills:  0       * insulin lispro 100 UNIT/ML injection   Commonly known as:  HumaLOG KWIKpen   Used for:  Type 2 diabetes mellitus with chronic kidney disease on chronic dialysis, unspecified long term insulin use status (H)        Dose:  5 Units   Inject 5 Units Subcutaneous 3 times daily (before meals)   Refills:  0       * HYDRALAZINE HCL PO        Dose:  25 mg   Take 25 mg by mouth daily   Refills:  0       *  "HYDRALAZINE HCL PO        Dose:  25 mg   Take 25 mg by mouth 2 times daily as needed for high blood pressure   Refills:  0       imiquimod 5 % cream   Commonly known as:  ALDARA        Apply topically as needed   Refills:  0       iron sucrose 20 MG/ML injection   Commonly known as:  VENOFER        Dose:  50 mg   Inject 50 mg into the vein once a week WIth dialysis   Refills:  0       isosorbide mononitrate 30 MG 24 hr tablet   Commonly known as:  IMDUR   Used for:  Coronary artery disease involving native heart, angina presence unspecified, unspecified vessel or lesion type, Hypertension secondary to other renal disorders        Dose:  60 mg   Take 2 tablets (60 mg) by mouth every evening   Quantity:  30 tablet   Refills:  0       lamISIL AT 1 % cream   Generic drug:  terbinafine        Apply topically 2 times daily as needed   Refills:  0       * LOSARTAN POTASSIUM PO        Dose:  100 mg   Take 100 mg by mouth daily as needed (with breakfast)   Refills:  0       * losartan 100 MG tablet   Commonly known as:  COZAAR   Used for:  Hypertension secondary to other renal disorders        Dose:  100 mg   Take 1 tablet (100 mg) by mouth At Bedtime   Quantity:  30 tablet   Refills:  0       MAGNESIUM OXIDE PO        Dose:  400 mg   Take 400 mg by mouth At Bedtime   Refills:  0       mycophenolate 500 MG tablet   Commonly known as:  CELLCEPT - GENERIC EQUIVALENT        Dose:  500 mg   Take 500 mg by mouth 2 times daily On an empty stomach   Refills:  0       NEPRO PO        1-3 times daily   Refills:  0       nitroglycerin 0.4 MG sublingual tablet   Commonly known as:  NITROSTAT   Used for:  Coronary artery disease due to lipid rich plaque, Status post coronary angioplasty        One tablet under the tongue every 5 minutes if needed for chest pain. May repeat every 5 minutes for a maximum of 3 doses in 15 minutes\"   Quantity:  25 tablet   Refills:  3       nystatin cream   Commonly known as:  MYCOSTATIN        Apply " topically daily as needed for dry skin   Refills:  0       order for DME   Used for:  SOB (shortness of breath), Generalized muscle weakness        Equipment being ordered: Other: 4WW Treatment Diagnosis: Decreased activity tolerance   Quantity:  1 each   Refills:  0       PREZISTA PO        Dose:  800 mg   Take 800 mg by mouth At Bedtime.   Refills:  0       priLOSEC 40 MG capsule   Generic drug:  omeprazole        Dose:  40 mg   Take 40 mg by mouth daily 1 hour before dinner   Refills:  0       ritonavir 100 MG capsule   Commonly known as:  NORVIR        Dose:  1 capsule   Take 1 capsule by mouth At Bedtime   Refills:  0       * Notice:  This list has 8 medication(s) that are the same as other medications prescribed for you. Read the directions carefully, and ask your doctor or other care provider to review them with you.      STOP taking     carvedilol 12.5 MG tablet   Commonly known as:  COREG                Where to get your medicines      Some of these will need a paper prescription and others can be bought over the counter. Ask your nurse if you have questions.     Bring a paper prescription for each of these medications     amLODIPine 2.5 MG tablet       You don't need a prescription for these medications     insulin glargine 100 UNIT/ML injection                Protect others around you: Learn how to safely use, store and throw away your medicines at www.disposemymeds.org.             Medication List: This is a list of all your medications and when to take them. Check marks below indicate your daily home schedule. Keep this list as a reference.      Medications           Morning Afternoon Evening Bedtime As Needed    abacavir 300 MG tablet   Commonly known as:  ZIAGEN   Take 600 mg by mouth every evening   Last time this was given:  600 mg on 4/17/2017  9:00 PM                                   albuterol 108 (90 BASE) MCG/ACT Inhaler   Commonly known as:  PROAIR HFA/PROVENTIL HFA/VENTOLIN HFA   Inhale 2  puffs into the lungs every 6 hours as needed for shortness of breath / dyspnea or wheezing                                   * amLODIPine 5 MG tablet   Commonly known as:  NORVASC   Take 5 mg by mouth daily as needed (only uses when blood pressure greater than 150-160)   Last time this was given:  2.5 mg on 4/18/2017 12:56 PM                                   * amLODIPine 2.5 MG tablet   Commonly known as:  NORVASC   Take 1 tablet (2.5 mg) by mouth daily   Last time this was given:  2.5 mg on 4/18/2017 12:56 PM                                   ANUCORT-HC 25 MG Suppository   Place 25 mg rectally 2 times daily as needed   Generic drug:  hydrocortisone                                   atorvastatin 40 MG tablet   Commonly known as:  LIPITOR   Take 40 mg by mouth At Bedtime   Last time this was given:  40 mg on 4/17/2017  9:00 PM                                   B complex-C-folic acid 1 MG capsule   Take 1 capsule by mouth every evening   Last time this was given:  1 capsule on 4/17/2017  9:00 PM                                   calcium acetate 667 MG Caps capsule   Commonly known as:  PHOSLO   2,001 mg 3 times daily (with meals) Take 3 capsules three times a day with meals   Last time this was given:  2,001 mg on 4/18/2017 12:56 PM                                         chlorhexidine 0.12 % solution   Commonly known as:  PERIDEX   Rinse and gargle 15 ml by mouth twice a day as directed.                                   CLONAZEPAM PO   Take 0.5 mg by mouth nightly as needed for anxiety (restless legs)   Last time this was given:  0.5 mg on 4/16/2017 11:02 PM                                   CLOPIDOGREL BISULFATE PO   Take 75 mg by mouth every evening   Last time this was given:  75 mg on 4/17/2017  9:00 PM                                   dapsone 100 MG tablet   Take 100 mg by mouth At Bedtime   Last time this was given:  100 mg on 4/17/2017  9:00 PM                                   DIALYVITE 800/ULTRA D Tabs    Take 1 tablet by mouth daily                                   dolutegravir 50 MG tablet   Commonly known as:  TIVICAY   Take 50 mg by mouth At Bedtime   Last time this was given:  50 mg on 4/17/2017  9:00 PM                                   DOXERCALCIFEROL IV   Inject 6 mcg into the vein three times a week (with dialysis)   Last time this was given:  1.5 mcg on 4/15/2017  2:35 PM                                   epoetin brittni 38447 UNIT/ML injection   Commonly known as:  EPOGEN/PROCRIT   Inject 11,000 Units Subcutaneous three times a week WITH DIALYSIS   Last time this was given:  4/18/2017  9:17 AM                                   * gabapentin 300 MG capsule   Commonly known as:  NEURONTIN   Take 300 mg by mouth every morning   Last time this was given:  300 mg on 4/18/2017 12:56 PM                                   * gabapentin 300 MG capsule   Commonly known as:  NEURONTIN   Take 600 mg by mouth every evening   Last time this was given:  300 mg on 4/18/2017 12:56 PM                                   guaiFENesin-dextromethorphan 100-10 MG/5ML syrup   Commonly known as:  ROBITUSSIN DM   Take 5 mLs by mouth every 4 hours as needed for cough   Last time this was given:  5 mLs on 4/17/2017  3:47 PM                                   * HumaLOG KWIKpen 100 UNIT/ML injection   Inject Subcutaneous 3 times daily (before meals) Sliding scale - 2 units per 50 over 150   Generic drug:  insulin lispro                                         * insulin lispro 100 UNIT/ML injection   Commonly known as:  HumaLOG KWIKpen   Inject 5 Units Subcutaneous 3 times daily (before meals)                                         * HYDRALAZINE HCL PO   Take 25 mg by mouth daily   Last time this was given:  25 mg on 4/18/2017 12:56 PM                                   * HYDRALAZINE HCL PO   Take 25 mg by mouth 2 times daily as needed for high blood pressure   Last time this was given:  25 mg on 4/18/2017 12:56 PM                              "      imiquimod 5 % cream   Commonly known as:  ALDARA   Apply topically as needed                                   insulin glargine 100 UNIT/ML injection   Commonly known as:  LANTUS   Inject 36 Units Subcutaneous every morning                                   iron sucrose 20 MG/ML injection   Commonly known as:  VENOFER   Inject 50 mg into the vein once a week WIth dialysis   Last time this was given:  50 mg on 4/18/2017  9:17 AM                                   isosorbide mononitrate 30 MG 24 hr tablet   Commonly known as:  IMDUR   Take 2 tablets (60 mg) by mouth every evening   Last time this was given:  60 mg on 4/17/2017  9:00 PM                                   lamISIL AT 1 % cream   Apply topically 2 times daily as needed   Generic drug:  terbinafine                                   * LOSARTAN POTASSIUM PO   Take 100 mg by mouth daily as needed (with breakfast)   Last time this was given:  100 mg on 4/17/2017  9:00 PM                                   * losartan 100 MG tablet   Commonly known as:  COZAAR   Take 1 tablet (100 mg) by mouth At Bedtime   Last time this was given:  100 mg on 4/17/2017  9:00 PM                                   MAGNESIUM OXIDE PO   Take 400 mg by mouth At Bedtime   Last time this was given:  400 mg on 4/17/2017  9:00 PM                                   mycophenolate 500 MG tablet   Commonly known as:  CELLCEPT - GENERIC EQUIVALENT   Take 500 mg by mouth 2 times daily On an empty stomach                                      NEPRO PO   1-3 times daily                                   nitroglycerin 0.4 MG sublingual tablet   Commonly known as:  NITROSTAT   One tablet under the tongue every 5 minutes if needed for chest pain. May repeat every 5 minutes for a maximum of 3 doses in 15 minutes\"                                   nystatin cream   Commonly known as:  MYCOSTATIN   Apply topically daily as needed for dry skin                                   order for DME   Equipment " being ordered: Other: 4WW Treatment Diagnosis: Decreased activity tolerance                                   PREZISTA PO   Take 800 mg by mouth At Bedtime.   Last time this was given:  800 mg on 4/17/2017  9:00 PM                                   priLOSEC 40 MG capsule   Take 40 mg by mouth daily 1 hour before dinner   Last time this was given:  40 mg on 4/18/2017 12:56 PM   Generic drug:  omeprazole                                   ritonavir 100 MG capsule   Commonly known as:  NORVIR   Take 1 capsule by mouth At Bedtime   Last time this was given:  100 mg on 4/17/2017  9:00 PM                                   * Notice:  This list has 10 medication(s) that are the same as other medications prescribed for you. Read the directions carefully, and ask your doctor or other care provider to review them with you.

## 2017-04-16 LAB
ABO + RH BLD: NORMAL
ABO + RH BLD: NORMAL
ANION GAP SERPL CALCULATED.3IONS-SCNC: 9 MMOL/L (ref 3–14)
BASOPHILS # BLD AUTO: 0 10E9/L (ref 0–0.2)
BASOPHILS NFR BLD AUTO: 0.5 %
BLD GP AB SCN SERPL QL: NORMAL
BLD PROD TYP BPU: NORMAL
BLD UNIT ID BPU: 0
BLD UNIT ID BPU: 0
BLOOD BANK CMNT PATIENT-IMP: NORMAL
BLOOD PRODUCT CODE: NORMAL
BLOOD PRODUCT CODE: NORMAL
BPU ID: NORMAL
BPU ID: NORMAL
BUN SERPL-MCNC: 44 MG/DL (ref 7–30)
CALCIUM SERPL-MCNC: 8.4 MG/DL (ref 8.5–10.1)
CHLORIDE SERPL-SCNC: 101 MMOL/L (ref 94–109)
CO2 SERPL-SCNC: 29 MMOL/L (ref 20–32)
CREAT SERPL-MCNC: 5.3 MG/DL (ref 0.66–1.25)
DIFFERENTIAL METHOD BLD: ABNORMAL
EOSINOPHIL # BLD AUTO: 0.1 10E9/L (ref 0–0.7)
EOSINOPHIL NFR BLD AUTO: 1.4 %
ERYTHROCYTE [DISTWIDTH] IN BLOOD BY AUTOMATED COUNT: 16.7 % (ref 10–15)
GFR SERPL CREATININE-BSD FRML MDRD: 11 ML/MIN/1.7M2
GLUCOSE BLDC GLUCOMTR-MCNC: 110 MG/DL (ref 70–99)
GLUCOSE BLDC GLUCOMTR-MCNC: 112 MG/DL (ref 70–99)
GLUCOSE BLDC GLUCOMTR-MCNC: 159 MG/DL (ref 70–99)
GLUCOSE BLDC GLUCOMTR-MCNC: 171 MG/DL (ref 70–99)
GLUCOSE BLDC GLUCOMTR-MCNC: 231 MG/DL (ref 70–99)
GLUCOSE SERPL-MCNC: 192 MG/DL (ref 70–99)
HCT VFR BLD AUTO: 21.5 % (ref 40–53)
HGB BLD-MCNC: 6.5 G/DL (ref 13.3–17.7)
HGB BLD-MCNC: 8.6 G/DL (ref 13.3–17.7)
IMM GRANULOCYTES # BLD: 0 10E9/L (ref 0–0.4)
IMM GRANULOCYTES NFR BLD: 0.7 %
LYMPHOCYTES # BLD AUTO: 0.9 10E9/L (ref 0.8–5.3)
LYMPHOCYTES NFR BLD AUTO: 21.4 %
MCH RBC QN AUTO: 28.8 PG (ref 26.5–33)
MCHC RBC AUTO-ENTMCNC: 30.2 G/DL (ref 31.5–36.5)
MCV RBC AUTO: 95 FL (ref 78–100)
MONOCYTES # BLD AUTO: 0.4 10E9/L (ref 0–1.3)
MONOCYTES NFR BLD AUTO: 10.1 %
NEUTROPHILS # BLD AUTO: 2.9 10E9/L (ref 1.6–8.3)
NEUTROPHILS NFR BLD AUTO: 65.9 %
NRBC # BLD AUTO: 0 10*3/UL
NRBC BLD AUTO-RTO: 0 /100
NUM BPU REQUESTED: 2
PLATELET # BLD AUTO: 112 10E9/L (ref 150–450)
POTASSIUM SERPL-SCNC: 4.3 MMOL/L (ref 3.4–5.3)
RBC # BLD AUTO: 2.26 10E12/L (ref 4.4–5.9)
SODIUM SERPL-SCNC: 139 MMOL/L (ref 133–144)
SPECIMEN EXP DATE BLD: NORMAL
TRANSFUSION STATUS PATIENT QL: NORMAL
TROPONIN I SERPL-MCNC: NORMAL UG/L (ref 0–0.04)
TSH SERPL DL<=0.005 MIU/L-ACNC: 1.43 MU/L (ref 0.4–4)
WBC # BLD AUTO: 4.3 10E9/L (ref 4–11)

## 2017-04-16 PROCEDURE — P9040 RBC LEUKOREDUCED IRRADIATED: HCPCS | Performed by: EMERGENCY MEDICINE

## 2017-04-16 PROCEDURE — 25000131 ZZH RX MED GY IP 250 OP 636 PS 637: Mod: GY | Performed by: INTERNAL MEDICINE

## 2017-04-16 PROCEDURE — 00000146 ZZHCL STATISTIC GLUCOSE BY METER IP

## 2017-04-16 PROCEDURE — 25000132 ZZH RX MED GY IP 250 OP 250 PS 637: Mod: GY | Performed by: INTERNAL MEDICINE

## 2017-04-16 PROCEDURE — 99232 SBSQ HOSP IP/OBS MODERATE 35: CPT | Performed by: INTERNAL MEDICINE

## 2017-04-16 PROCEDURE — 85025 COMPLETE CBC W/AUTO DIFF WBC: CPT | Performed by: INTERNAL MEDICINE

## 2017-04-16 PROCEDURE — A9270 NON-COVERED ITEM OR SERVICE: HCPCS | Mod: GY | Performed by: INTERNAL MEDICINE

## 2017-04-16 PROCEDURE — 80048 BASIC METABOLIC PNL TOTAL CA: CPT | Performed by: INTERNAL MEDICINE

## 2017-04-16 PROCEDURE — 36415 COLL VENOUS BLD VENIPUNCTURE: CPT | Performed by: INTERNAL MEDICINE

## 2017-04-16 PROCEDURE — 84484 ASSAY OF TROPONIN QUANT: CPT | Performed by: INTERNAL MEDICINE

## 2017-04-16 PROCEDURE — 5A1D60Z PERFORMANCE OF URINARY FILTRATION, MULTIPLE: ICD-10-PCS | Performed by: INTERNAL MEDICINE

## 2017-04-16 PROCEDURE — 84443 ASSAY THYROID STIM HORMONE: CPT | Performed by: INTERNAL MEDICINE

## 2017-04-16 PROCEDURE — 99222 1ST HOSP IP/OBS MODERATE 55: CPT | Performed by: INTERNAL MEDICINE

## 2017-04-16 PROCEDURE — 12000007 ZZH R&B INTERMEDIATE

## 2017-04-16 PROCEDURE — 25000128 H RX IP 250 OP 636: Performed by: INTERNAL MEDICINE

## 2017-04-16 PROCEDURE — 85018 HEMOGLOBIN: CPT | Performed by: INTERNAL MEDICINE

## 2017-04-16 RX ADMIN — DARUNAVIR 800 MG: 800 TABLET, FILM COATED ORAL at 22:59

## 2017-04-16 RX ADMIN — CALCIUM ACETATE 2001 MG: 667 CAPSULE ORAL at 11:51

## 2017-04-16 RX ADMIN — RITONAVIR 100 MG: 100 CAPSULE ORAL at 22:13

## 2017-04-16 RX ADMIN — SODIUM CHLORIDE 250 ML: 9 INJECTION, SOLUTION INTRAVENOUS at 15:16

## 2017-04-16 RX ADMIN — GABAPENTIN 300 MG: 300 CAPSULE ORAL at 08:21

## 2017-04-16 RX ADMIN — Medication 1 CAPSULE: at 19:59

## 2017-04-16 RX ADMIN — DOLUTEGRAVIR SODIUM 50 MG: 50 TABLET, FILM COATED ORAL at 22:13

## 2017-04-16 RX ADMIN — LOSARTAN POTASSIUM 100 MG: 100 TABLET, FILM COATED ORAL at 22:07

## 2017-04-16 RX ADMIN — OMEPRAZOLE 40 MG: 20 CAPSULE, DELAYED RELEASE ORAL at 08:21

## 2017-04-16 RX ADMIN — MAGNESIUM OXIDE TAB 400 MG (241.3 MG ELEMENTAL MG) 400 MG: 400 (241.3 MG) TAB at 22:07

## 2017-04-16 RX ADMIN — MYCOPHENOLATE MOFETIL 500 MG: 250 CAPSULE ORAL at 08:20

## 2017-04-16 RX ADMIN — DAPSONE 100 MG: 25 TABLET ORAL at 22:06

## 2017-04-16 RX ADMIN — ATORVASTATIN CALCIUM 40 MG: 40 TABLET, FILM COATED ORAL at 22:07

## 2017-04-16 RX ADMIN — CALCIUM ACETATE 2001 MG: 667 CAPSULE ORAL at 18:16

## 2017-04-16 RX ADMIN — MYCOPHENOLATE MOFETIL 500 MG: 250 CAPSULE ORAL at 20:00

## 2017-04-16 RX ADMIN — CALCIUM ACETATE 2001 MG: 667 CAPSULE ORAL at 08:19

## 2017-04-16 RX ADMIN — INSULIN GLARGINE 40 UNITS: 100 INJECTION, SOLUTION SUBCUTANEOUS at 08:21

## 2017-04-16 RX ADMIN — ISOSORBIDE MONONITRATE 60 MG: 60 TABLET, EXTENDED RELEASE ORAL at 20:00

## 2017-04-16 RX ADMIN — HYDRALAZINE HYDROCHLORIDE 25 MG: 25 TABLET ORAL at 08:21

## 2017-04-16 RX ADMIN — INSULIN ASPART 1 UNITS: 100 INJECTION, SOLUTION INTRAVENOUS; SUBCUTANEOUS at 08:34

## 2017-04-16 RX ADMIN — CLOPIDOGREL 75 MG: 75 TABLET, FILM COATED ORAL at 20:00

## 2017-04-16 RX ADMIN — ABACAVIR 600 MG: 300 TABLET, FILM COATED ORAL at 20:00

## 2017-04-16 RX ADMIN — SODIUM CHLORIDE 250 ML: 9 INJECTION, SOLUTION INTRAVENOUS at 15:15

## 2017-04-16 RX ADMIN — INSULIN ASPART 1 UNITS: 100 INJECTION, SOLUTION INTRAVENOUS; SUBCUTANEOUS at 11:54

## 2017-04-16 RX ADMIN — CLONAZEPAM 0.5 MG: 0.5 TABLET ORAL at 23:02

## 2017-04-16 RX ADMIN — GABAPENTIN 600 MG: 300 CAPSULE ORAL at 19:59

## 2017-04-16 NOTE — CONSULTS
CARDIOLOGY CONSULTATION       REQUESTING PHYSICIAN:  Dr. Isidro Johnson.      REASON FOR CONSULTATION:  Bradycardia.      HISTORY OF PRESENT ILLNESS:  Mr. Donald Raza is a very pleasant 67-year-old gentleman who was admitted yesterday with profound bradycardia and heart rates in the 30s.  He states that he did feel a pain across his bilateral shoulders at onset and getting waking that morning; however, this was not typical of his previous anginal chest discomfort.  It was brief and self-limited and he subsequently complained predominantly of generalized weakness and discomfort all over his whole body.  He states that previously his angina was more back pain, which he did not experience this time.  Troponins have been negative.  He was found to be 13 pounds up since the beginning of a recent hospitalization for pneumonia from which he was discharged on 04/11/2017.  According to the patient, he did not have a chance to have any dietary noncompliance, never having gone home from TCU.  His dialysis nurse points out that he was up 10 pounds at the time of discharge.      The patient himself, as well as his nurse, notes that as he was dialyzed for 4 liters yesterday, his heart rate improved significantly as did his blood pressure.  Mr. Raza is on carvedilol at home.      He has felt well since his dialysis yesterday.  His heart rate remained in the 60s-70s.      Mr. Raza states that at home; however, he does get lightheadedness, which is not infrequent though he cannot tell an exact frequency.  He has not had celsa syncope.      He has a past medical history remarkable for coronary artery disease and non-ST elevation myocardial infarction, having undergone cardiac catheterization in 08/2016 with a PCI of a major diagonal.  He had proximal and mid LAD stenting in 2015 with subsequent diagonal stenting originating within the LAD stented segment in 12/2015 for this non-STEMI.  He has end-stage renal failure on  dialysis, as well as HIV.  During a hospitalization in January, he had atypical chest discomfort with a mild troponin rise and was catheterized again without obstructive disease.  His last echo in January demonstrated mild to moderate concentric LVH with normal systolic function and grade II diastolic dysfunction.      PAST MEDICAL HISTORY:  Remarkable for type 2 diabetes, history of TIA, non-ST elevation with PCI, end-stage renal disease on hemodialysis, hyperlipidemia, hypertension, health-care associated pneumonia, Influenza A and HIV and anemia last admission requiring 6 units of packed red blood cells.      PAST SURGICAL HISTORY:  Includes cholecystectomy, appendectomy, history of colostomy in 1999 and angioplasty in 2016 as outlined.      FAMILY HISTORY:  Remarkable for coronary disease in his family in siblings.      SOCIAL HISTORY:  The patient is a former smoker and does not drink alcohol or use illicit drugs.      REVIEW OF SYSTEMS:  As outlined in the H&P of Dr. Johnson dated 04/15/2017 and included in the patient's online medical record.      PHYSICAL EXAMINATION:   VITAL SIGNS:  Blood pressure is 137/72, heart rate 65 and regular, oxygen saturation 98%.  The patient is afebrile.   IN GENERAL:  This is a well-appearing gentleman in no apparent distress.  He is alert and oriented x3.   HEAD AND NECK:  Unremarkable.  Pupils are equal, reactive to light and accommodation.  Extraocular motions are intact.  Mucous membranes are moist.  There is no jaundice or icterus.   CHEST:  Clear to auscultation bilaterally anteriorly.   CARDIAC:  S1, S2 are regular without clear murmurs, rubs or gallops.  JVP cannot be well assessed and the patient is lying flat for dialysis.     ABDOMEN:  Benign.  Bowel sounds are normal.   EXTREMITIES:  With 1+ edema bilaterally.   NEUROLOGIC:  Motor and sensory grossly intact.   MUSCULOSKELETAL:  The patient moves all extremities equally.      LABORATORY:  Reviewed with a creatinine of  5.3, BUN 44, potassium 4.3, troponin less than 0.015, TSH 1.43, glucose 171.  White count 4.3, hemoglobin 6.5, and platelets 112.      ASSESSMENT AND PLAN:  Pleasant 67-year-old gentleman with bradycardia as outlined above.  This was in the setting of marked volume overload and seemed to improve with dialysis remarkably.  I am not sure if it could have been related also to his home carvedilol.  Carvedilol was not substantially dialyzed off, however.        He has had lightheadedness at home, though no presyncope or syncope.      We will continue to monitor telemetry.  This does not appear to be likely primarily ischemic with what even the patient describes as very atypical symptoms.  He has got negative troponins.  We certainly can consider a nuclear stress test; however, with the bilateral shoulder discomfort at onset of his presentation and bradycardia.  I would also repeat an echocardiogram to compare to prior.  These will be ordered tomorrow.      He is profoundly anemic and the nuclear stress test should be held until his hemoglobin could be corrected greater than 9.  I am not clear regarding the basis for his anemia.  It is stated likely multifactorial due to end-stage renal disease and other medications.  This can be clarified with his primary service to exclude anemia of blood loss.      If no significant bradycardia is found and pending the results of his telemetry here, we could consider an outpatient event recorder for question of candidacy of pacemaker.  We have repeated a TSH which was normal.      TOTAL TIME:  45 minutes, 40 in coordination of care and counseling.         DRISS GONZALEZ MD             D: 2017 16:57   T: 2017 17:30   MT: ABAD#[id number]      Name:     GREGORIO BRUSH   MRN:      [id number]        Account:       [id number]   :      1950           Consult Date:  2017      Document: [id number]       cc: Isidro Johnson MD

## 2017-04-16 NOTE — PROGRESS NOTES
Potassium   Date Value Ref Range Status   04/16/2017 4.3 3.4 - 5.3 mmol/L Final     Lab Results   Component Value Date    HGB 6.5 04/16/2017     Weight: 90.1 kg (198 lb 10.2 oz)  POST WT: 96.1kg  DIALYSIS PROCEDURE NOTE    Patient dialyzed for 3.5 hrs. on a  K3 bath with a net fluid removal of  3.5 L.    A BFR of 450 ml/min was obtained via a left AV fistula using 15 gauge needles.     Administered 1 unit PRBC per protocol.    The patient was seen by Dr. Hamilton during the treatment.    Total heparin received during the treatment: 0 units.   Sites held x 12 min then  pressure drsgs applied.    Meds  Given: none   Complications: None.    Procedure and ESRD teaching done and questions answered. See flowsheet in EPIC for further details and post assessment.  Machine water alarm in place and functioning.  Verified consent for dialysis.  Pt returned via wheelchair  Vascular Access: Aseptic prep done for both on/off.  Report received from: Radha Grossman RN  Report given to: Radha Grossman RN  HEPATITIS B SURFACE ANTIGEN: Negative DATE: 1/18/17   HEPATITIS B SURFACE ANTIBODY: Immune DATE: 1/18/17  Chlorine/Chloramine water system checked every 4 hours.  Outpatient Dialysis at [facility] Tu.Th.Sat.    Catina Blank RN  [facility] Acute Dialysis

## 2017-04-16 NOTE — PLAN OF CARE
Pt sat up in the chair the whole morning. Appetite good. VSS, afebrile.  Hr in the 60's. Pt received 1 unit of blood in the room and then went to dialysis this afternoon. Is receiving a 2nd unit in dialysis.  Pt is A&O x 4.  Family came to see pt before dialysis.  Pt still at dialysis at this time.

## 2017-04-16 NOTE — PROGRESS NOTES
[facility]  Hospitalist Progress Note  Isidro Johnson MD 04/16/2017    Reason for Stay (Diagnosis): Anemia, generalized weakness.         Assessment and Plan:      Summary of Stay: Donald Raza is a 67 year old male with a complicated PMH to include end-stage renal disease on hemodialysis Tuesdays, Thursdays and Saturdays, history of colon cancer, status post hemicolectomy, coronary artery disease, status post PCI to his LAD, hyperlipidemia, hypertension, HIV with most recent CD4 count of 184 admitted on 4/15/2017 with Generalized weakness, bradycardia.    Problem List:   1. Generalized weakness- multifactorial, deconditioning, anemia, underlying chronic illness- PT/OT.  2. Sinus bradycardia- resolved. The cause is not clear. Monitor on tele. Cardiology consulted. Held BB.  3. ESRD on HD- Had his scheduled HD on 04/15. Nephrology following.  4. Anemia- acute on chronic- due to chronic diseases- Hgb is down to 6.5 today. 2 units PRBC ordered. Blood bank requested Irradiated, as he had irradiated PRBC last admission as well. The reason was not clear at this point, will try to sort out and discuss with his previous provider. I discussed this with blood bank. Patient will get the PRBC with HD if blood is ready for him..  5. DM II- Continue insuline as ordered, glucose better controlled.  6. HIV infection- Continue his home medications, HAAR medications. Needs follow up as an out patient.    DVT Prophylaxis: Pneumatic Compression Devices.  Code Status: Full Code  Discharge Dispo: Home  Estimated Disch Date / # of Days until Disch: 1-2 days after transfusion, if continue to improve.  I discussed with him at length the plan of care.      Interval History (Subjective):      Patient seen and examined, feels much better today, concerned that his Hgb is low again. He also said he had irradiated blood last time, but did not tell me the reason.                  Physical Exam:      Last Vital Signs:  BP  "125/65  Pulse 60  Temp 96.2  F (35.7  C) (Oral)  Resp 18  Ht 1.803 m (5' 11\")  Wt 90.1 kg (198 lb 9.6 oz)  SpO2 95%  BMI 27.7 kg/m2    I/O last 3 completed shifts:  In: 243 [P.O.:240; I.V.:3]  Out: 4000 [Other:4000]  Vitals:    04/15/17 0804 04/15/17 1225 04/15/17 1323 04/16/17 0603   Weight: 90.7 kg (200 lb) 96.2 kg (212 lb) 96.2 kg (212 lb 1.3 oz) 90.1 kg (198 lb 9.6 oz)       Constitutional: Awake, alert, cooperative, no apparent distress   Respiratory: Clear to auscultation bilaterally, no crackles or wheezing   Cardiovascular: Regular rate and rhythm, normal S1 and S2, and no murmur noted   Abdomen: Normal bowel sounds, soft, non-distended, non-tender   Skin: No rashes, no cyanosis, dry to touch   Neuro: Alert and oriented x3, no weakness, numbness, memory loss   Extremities: No edema, normal range of motion   Other(s):        All other systems: Negative          Medications:      All current medications were reviewed with changes reflected in problem list.         Data:      All new lab and imaging data was reviewed.   Labs:  No results for input(s): CULT in the last 168 hours.    Recent Labs  Lab 04/16/17  0555 04/15/17  0819 04/11/17  0628    138 139   POTASSIUM 4.3 5.1 4.8   CHLORIDE 101 99 101   CO2 29 26 24   ANIONGAP 9 13 14   * 196* 145*   BUN 44* 73* 139*   CR 5.30* 7.48* 8.39*   GFRESTIMATED 11* 7* 6*   GFRESTBLACK 13* 9* 8*   PRABHA 8.4* 8.6 9.0       Recent Labs  Lab 04/16/17  0555 04/15/17  0819 04/11/17  0628   WBC 4.3 7.1 9.9   HGB 6.5* 7.1* 7.3*   HCT 21.5* 22.4* 23.0*   MCV 95 93 90   * 114* 181       Recent Labs  Lab 04/16/17  1153 04/16/17  0717 04/16/17  0555 04/16/17  0231 04/15/17  2224 04/15/17  1749 04/15/17  0819  04/11/17  0628  04/10/17  0729   GLC  --   --  192*  --   --   --  196*  --  145*  --  89   * 171*  --  231* 245* 75  --   < >  --   < >  --    < > = values in this interval not displayed.   Imaging:   Results for orders placed or performed " during the hospital encounter of 04/15/17   Chest XR,  PA & LAT    Narrative    CHEST TWO VIEWS    4/15/2017 9:13 AM     HISTORY: Cough    COMPARISON: Chest x-ray 4/7/2017.      Impression    IMPRESSION: New opacity at the right lung base. This could represent  developing airspace disease including pneumonia. Left chest is  obscured by an overlying object projected over the left mid to low  chest. Borderline cardiomegaly appears larger. Vascular congestion  appears stable.    JONY ESPAÑA MD   CT Head w/o Contrast    Narrative    CT HEAD W/O CONTRAST 4/15/2017 9:04 AM     HISTORY:  headache    TECHNIQUE:  Axial images of the head without IV contrast material.  Radiation dose for this scan was reduced using automated exposure  control, adjustment of the mA and/or kV according to patient size, or  iterative reconstruction technique.    COMPARISON: 5/19/2016    FINDINGS: No intracranial hemorrhage, mass lesion, or acute infarct is  seen. No skull fracture. Atrophy. There is bilateral white matter  low-density, likely related to small vessel ischemic disease. Mild  left maxillary sinus mucosal thickening.       Impression    IMPRESSION:  No acute intracranial pathology.    WOLFGANG ROGER MD

## 2017-04-16 NOTE — PLAN OF CARE
Pt returned from Dialysis.  Hr in the 60-70's and BP is up also.  Pt states he can breathe better. Cont to have coarse lung sounds.  Able to eat and take med's.  Bs was 75 and had no coverage. Cont to follow POc.

## 2017-04-16 NOTE — PROGRESS NOTES
X-Cover    Notified about Hb of 6.5, has been hovering around 7 range, required RBC transfusion during recent hospital stay as well. No obvious bleeding, Will order 1 unit of RBC    Update:  Notified about nursing staff that Blood Bank is requesting irradiated blood. Cannot find indication for that in chart. He is hemodynamically stable without any active bleeding and transfusion is not urgent right this moment, will defer to my colleague rounding in morning and nephrology.

## 2017-04-16 NOTE — CONSULTS
Care Transition Initial Assessment - RN    Reason For Consult: care coordination/care conference, discharge planning   Met with: Patient.    DATA   Active Problems:    Bradycardia       Cognitive Status: awake, alert and oriented.  Primary Care Clinic Name: [facility]  Primary Care MD Name: William  Contact information and PCP information verified: Yes    Lives With: child(jean), adult, spouse  Living Arrangements: house  Quality Of Family Relationships: supportive, helpful, involved  Description of Support System: Supportive, Involved   Who is your support system?: Wife   Support Assessment: Adequate family and caregiver support, Adequate social supports     Insurance concerns: No Insurance issues identified    ASSESSMENT  Patient currently receives the following services:  NONE        Identified issues/concerns regarding health management: Patient discharged from [facility] on 4/11, was home for 3 days.  Patient lives with spouse and daughter.  They are very supportive/helpful.     Patient is very frustrated with his health right now.  He was brought in to ED for bradycardia- which is new to him.  He states that he doesn't know what is causing that or the fluid build up, per pt has had significant weight gain in last few days.  He states he is compliant with his HD, last run on Saturday at [facility].  He is compliant with sodium restrictions and follows a renal diet.    Patient typically has dialysis every Tuesday/Thursday/Saturday at [facility]    Has DM2 - checks BS 3x per day.    PLAN  Patient anticipates discharging to home with family.     Patient anticipates needs for home equipment: No    Plan/Disposition: Home   Appointments: Pt will make own f/u appointments      Care  (CTS) will continue to follow as needed.      Zoe Fish, RN,BSN, CTS  [facility]  Care Coordination  [phone]

## 2017-04-16 NOTE — PROGRESS NOTES
"/54 (BP Location: Right arm)  Pulse 72  Temp 99.5  F (37.5  C) (Oral)  Resp 18  Ht 1.803 m (5' 11\")  Wt 96.2 kg (212 lb 1.3 oz)  SpO2 93%  BMI 29.58 kg/m2   Admitted-The patient presents today via EMS with generalized weakness. He describes this morning at 0600, 2 hours ago, he awoke feeling generally weak with a heavy sensation in his shoulders. He was unable to do his normal morning activities due to his weakness, including tying his shoes, therefore called 911 services. The patient denies nausea, fevers, vomiting abdominal pain, chest pain, shortness of breath, or any associated symptoms. He continues to cough but states it has not been worsening or productive. Regarding his shoulder heaviness, the patient does note for previous NSTEMI he presented with symptoms of chest heaviness and back pain. Patient continues to make some urine but notes has had 4 softer than usual stools this morning. Some increased leg swelling recently. He denies history of bradycardia.  Hx-end stage renal disease on dialysis T/Thur/Sat (last dialyzed Thur), HIV, and recently discharged on 4/11 after two week stay for pneumonia, influenza, and anemia of unknown etiology requiring 6 packed RBCs, DM2, transient ischemia attackNon-ST elevation myocardial with PCI, CAD, HTN, HLP  Code- Full  A/O x4 very pleasant kj [place] speaking but speak english very well  Lung Sound- diminished but clear on 2L NC  Heart tones-WNL Tele- SR   Edema-none  GI- BS-active Flatus-yes- Nausea-none BM-4/15  Tolerating dialysis Diet  - voids very small amounts  Skin- WNL dialysis shunt to L arm- bruit and thrill present   IV-sl'd  Pain-chest pain present but declines meds  Activity-has not been OOB due to bradycardia A2 for the first time OOB  Labs-BS checks ac and hs BUN 73 creat 7.48 mag 2.8 phos 6.9 k 5.1 N-terminal Pro BNP 12779  DC plan-The patient is being admitted as an inpatient; expect at least 2 night stay in the hospital for close " monitoring, he was bradycardic earlier which was significant in the 30s. Patient has transcutaneous pace placed, atropine 0.4 mg every 5 hours x3 if needed is ordered but MD needs to be contacted prior to giving this medication. Nephrologist is consulted for hemodialysis and cardiologist is also consulted to help manage his bradycardia, Coreg is discontinued. We will continue most of his home medication and also antiretroviral medications, patient will have his hemodialysis today.

## 2017-04-16 NOTE — CONSULTS
Pt seen- dictation pending. 66 yo man with ESRD and CAD. Has had frequent LH at home but no syncope. Noted to have profound bradycardia (30's) on admission with general malaise and 14 lbs volume up. Bradycardia improved with HD yesterday.   HD nurse also notes his weight increased 10 lbs during recent hospitalization for pneumonia. He has not been home to be noncompliant per pt ;)  He had gripping bilat shoulder discomfort at waking morning of admission per patient- this is not similar to his angina which he describes as back pain. Negative cath 1/2017. Neg trop x 2. He states he really felt more generalized discomfort rather than anginal discomfort.   Would follow telemetry - the patient is on carvedilol at home and this may have to be adjusted prior to consideration of PPM. Heart rate has been maintaining in 60-70s today  TSH WNLs 9/16- may redraw  Will check echo tomorrow. Considered nuc stress as well as discussed with patient-

## 2017-04-16 NOTE — PROGRESS NOTES
Inpatient Dialysis Progress Note           Assessment and Plan:   ESRD, status quo.  Stable dialysis run thus far.  Extra dialysis today recommended due to need for transfusion and ongoing O2 requirement probably related to volume excess.  Transfusion uncomplicated thus far.  Expect good chemical exchange and improved fluid balance by end of run.  Next HD probably 4/18, per usual schedule.       Bradycardia    * No resolved hospital problems. *               Interval History:   no new complaints, denies chest pain, denies abdominal pain and alert, oriented to person, place and time.  Examined earlier, again now shortly after dialysis initiation.  VS ok.  No fever.  Pulse ~60 NSR.  SaO2 mid-90's with NC supp.  Wt 90.1kg.  Out-pt goal wt 83.5.  Intake ok.  No UO.  Meds and labs reviewed.  Lytes nl.  BUN/Cr in expected range.  Hgb down to 6.5; CBC o/w stable.                 Dialysis Details:   Current weight: 90.1 kg (actual weight)  Dry Weight: 83.5  Dialysis Temp: 36.5  C  Access Device: AVF  Access Site: left  Dialyzer: Optiflux 160  Dialysis Bath: K+ 3  Sodium Profile: no  UF Goal: 4L  Blood Flow Rate (mL/min): 400  Total Treatment Time (hrs): 3.5  Heparin: none    Medications:  EPO dose: no  Zemplar: no  IV Fe: no            Medications:       sodium chloride 0.9%  250 mL Hemodialysis Machine Once     sodium chloride 0.9%  250-1,000 mL Intravenous Once in dialysis     - MEDICATION INSTRUCTIONS for Dialysis Patients -   Does not apply See Admin Instructions     sodium chloride (PF)  3 mL Intracatheter Q8H     abacavir  600 mg Oral QPM     atorvastatin  40 mg Oral At Bedtime     B complex-C-folic acid  1 capsule Oral QPM     calcium acetate  2,001 mg Oral TID w/meals     clopidogrel (PLAVIX) tablet 75 mg  75 mg Oral QPM     darunavir  800 mg Oral At Bedtime     dolutegravir  50 mg Oral At Bedtime     gabapentin  300 mg Oral QAM     gabapentin  600 mg Oral QPM     hydrALAZINE (APRESOLINE) tablet 25 mg  25 mg Oral  Daily     [START ON 4/18/2017] iron sucrose  50 mg Intravenous Weekly     isosorbide mononitrate  60 mg Oral QPM     losartan  100 mg Oral At Bedtime     magnesium oxide (MAG-OX) tablet 400 mg  400 mg Oral At Bedtime     mycophenolate  500 mg Oral BID     omeprazole  40 mg Oral Daily     ritonavir  100 mg Oral At Bedtime     dapsone  100 mg Oral At Bedtime     insulin aspart  1-7 Units Subcutaneous TID AC     insulin aspart  1-5 Units Subcutaneous At Bedtime     insulin glargine  40 Units Subcutaneous QAM AC     insulin aspart  6 Units Subcutaneous TID w/meals       - MEDICATION INSTRUCTIONS -                      Physical Exam:       Vital Sign Ranges  Temp:  [95.3  F (35.2  C)-99.5  F (37.5  C)] 97.4  F (36.3  C)  Pulse:  [59-72] 59  Heart Rate:  [58-74] 62  Resp:  [16-18] 18  BP: (123-158)/(54-81) 131/68  SpO2:  [93 %-99 %] 96 %    Weight, current: 90.1 kg (actual weight)  Weight change:     I/O last 3 completed shifts:  In: 243 [P.O.:240; I.V.:3]  Out: 4000 [Other:4000]    Physical Exam:   General:  Patient comfortable, in no apparent distress.  Awake, alert, oriented x3.  Neck:  Supple, no JVD.  Lungs:  Few crackles to auscultation bilaterally.  Cardiac:  Regular rate and rhythm, no murmurs, rub, or gallops.  Abdomen:  Soft, nontender, physiologic sounds.  Extremities:  Without edema.  2+ pulses.  Skin:  Warm, dry.  Neurologic:  No focal deficits.             Data:        Lab Results   Component Value Date     04/16/2017    Lab Results   Component Value Date    CHLORIDE 101 04/16/2017    Lab Results   Component Value Date    BUN 44 (H) 04/16/2017      Lab Results   Component Value Date    POTASSIUM 4.3 04/16/2017    Lab Results   Component Value Date    CO2 29 04/16/2017    Lab Results   Component Value Date    CR 5.30 (H) 04/16/2017        Lab Results   Component Value Date     04/16/2017     04/15/2017     04/11/2017     Lab Results   Component Value Date    POTASSIUM 4.3 04/16/2017     POTASSIUM 5.1 04/15/2017    POTASSIUM 4.8 04/11/2017     Lab Results   Component Value Date    CHLORIDE 101 04/16/2017    CHLORIDE 99 04/15/2017    CHLORIDE 101 04/11/2017     Lab Results   Component Value Date    CO2 29 04/16/2017    CO2 26 04/15/2017    CO2 24 04/11/2017     Lab Results   Component Value Date    CR 5.30 (H) 04/16/2017    CR 7.48 (H) 04/15/2017    CR 8.39 (H) 04/11/2017     Lab Results   Component Value Date    BUN 44 (H) 04/16/2017    BUN 73 (H) 04/15/2017     (H) 04/11/2017     Lab Results   Component Value Date    HGB 6.5 (LL) 04/16/2017    HGB 7.1 (L) 04/15/2017    HGB 7.3 (L) 04/11/2017     Lab Results   Component Value Date    PH 7.38 01/07/2017    PO2 262 (H) 01/07/2017    PCO2 42 01/07/2017    HCO3 25 01/07/2017    SARANYA 2.5 01/06/2017           Pankaj Hamilton MD  Nephrology; [employer] [employer]  [phone]

## 2017-04-17 ENCOUNTER — APPOINTMENT (OUTPATIENT)
Dept: CARDIOLOGY | Facility: CLINIC | Age: 67
DRG: 682 | End: 2017-04-17
Attending: INTERNAL MEDICINE
Payer: MEDICARE

## 2017-04-17 LAB
GLUCOSE BLDC GLUCOMTR-MCNC: 100 MG/DL (ref 70–99)
GLUCOSE BLDC GLUCOMTR-MCNC: 110 MG/DL (ref 70–99)
GLUCOSE BLDC GLUCOMTR-MCNC: 113 MG/DL (ref 70–99)
GLUCOSE BLDC GLUCOMTR-MCNC: 138 MG/DL (ref 70–99)
GLUCOSE BLDC GLUCOMTR-MCNC: 139 MG/DL (ref 70–99)
GLUCOSE BLDC GLUCOMTR-MCNC: 178 MG/DL (ref 70–99)
GLUCOSE BLDC GLUCOMTR-MCNC: 63 MG/DL (ref 70–99)
GLUCOSE BLDC GLUCOMTR-MCNC: 88 MG/DL (ref 70–99)
HGB BLD-MCNC: 9.1 G/DL (ref 13.3–17.7)
INTERPRETATION ECG - MUSE: NORMAL

## 2017-04-17 PROCEDURE — 12000007 ZZH R&B INTERMEDIATE

## 2017-04-17 PROCEDURE — 93308 TTE F-UP OR LMTD: CPT | Mod: 26 | Performed by: INTERNAL MEDICINE

## 2017-04-17 PROCEDURE — A9270 NON-COVERED ITEM OR SERVICE: HCPCS | Mod: GY | Performed by: INTERNAL MEDICINE

## 2017-04-17 PROCEDURE — 93308 TTE F-UP OR LMTD: CPT

## 2017-04-17 PROCEDURE — 99232 SBSQ HOSP IP/OBS MODERATE 35: CPT | Mod: 25 | Performed by: INTERNAL MEDICINE

## 2017-04-17 PROCEDURE — 93321 DOPPLER ECHO F-UP/LMTD STD: CPT | Mod: 26 | Performed by: INTERNAL MEDICINE

## 2017-04-17 PROCEDURE — 99232 SBSQ HOSP IP/OBS MODERATE 35: CPT | Performed by: INTERNAL MEDICINE

## 2017-04-17 PROCEDURE — 36415 COLL VENOUS BLD VENIPUNCTURE: CPT | Performed by: INTERNAL MEDICINE

## 2017-04-17 PROCEDURE — 93325 DOPPLER ECHO COLOR FLOW MAPG: CPT | Mod: 26 | Performed by: INTERNAL MEDICINE

## 2017-04-17 PROCEDURE — 85018 HEMOGLOBIN: CPT | Performed by: INTERNAL MEDICINE

## 2017-04-17 PROCEDURE — 25000132 ZZH RX MED GY IP 250 OP 250 PS 637: Mod: GY | Performed by: INTERNAL MEDICINE

## 2017-04-17 PROCEDURE — 00000146 ZZHCL STATISTIC GLUCOSE BY METER IP

## 2017-04-17 PROCEDURE — 25000131 ZZH RX MED GY IP 250 OP 636 PS 637: Mod: GY | Performed by: INTERNAL MEDICINE

## 2017-04-17 RX ORDER — AMLODIPINE BESYLATE 2.5 MG/1
2.5 TABLET ORAL DAILY
Status: DISCONTINUED | OUTPATIENT
Start: 2017-04-17 | End: 2017-04-17

## 2017-04-17 RX ADMIN — DAPSONE 100 MG: 25 TABLET ORAL at 21:00

## 2017-04-17 RX ADMIN — LOSARTAN POTASSIUM 100 MG: 100 TABLET, FILM COATED ORAL at 21:00

## 2017-04-17 RX ADMIN — GABAPENTIN 300 MG: 300 CAPSULE ORAL at 10:34

## 2017-04-17 RX ADMIN — MAGNESIUM OXIDE TAB 400 MG (241.3 MG ELEMENTAL MG) 400 MG: 400 (241.3 MG) TAB at 21:00

## 2017-04-17 RX ADMIN — INSULIN GLARGINE 40 UNITS: 100 INJECTION, SOLUTION SUBCUTANEOUS at 10:32

## 2017-04-17 RX ADMIN — MYCOPHENOLATE MOFETIL 500 MG: 250 CAPSULE ORAL at 10:35

## 2017-04-17 RX ADMIN — OMEPRAZOLE 40 MG: 20 CAPSULE, DELAYED RELEASE ORAL at 10:33

## 2017-04-17 RX ADMIN — GUAIFENESIN AND DEXTROMETHORPHAN 5 ML: 100; 10 SYRUP ORAL at 15:47

## 2017-04-17 RX ADMIN — ACETAMINOPHEN 650 MG: 325 TABLET, FILM COATED ORAL at 00:12

## 2017-04-17 RX ADMIN — DARUNAVIR 800 MG: 800 TABLET, FILM COATED ORAL at 21:00

## 2017-04-17 RX ADMIN — MYCOPHENOLATE MOFETIL 500 MG: 250 CAPSULE ORAL at 21:00

## 2017-04-17 RX ADMIN — HYDRALAZINE HYDROCHLORIDE 25 MG: 25 TABLET ORAL at 10:32

## 2017-04-17 RX ADMIN — CALCIUM ACETATE 2001 MG: 667 CAPSULE ORAL at 10:35

## 2017-04-17 RX ADMIN — ATORVASTATIN CALCIUM 40 MG: 40 TABLET, FILM COATED ORAL at 21:00

## 2017-04-17 RX ADMIN — ISOSORBIDE MONONITRATE 60 MG: 60 TABLET, EXTENDED RELEASE ORAL at 21:00

## 2017-04-17 RX ADMIN — GABAPENTIN 600 MG: 300 CAPSULE ORAL at 21:00

## 2017-04-17 RX ADMIN — RITONAVIR 100 MG: 100 CAPSULE ORAL at 21:00

## 2017-04-17 RX ADMIN — DOLUTEGRAVIR SODIUM 50 MG: 50 TABLET, FILM COATED ORAL at 21:00

## 2017-04-17 RX ADMIN — CLOPIDOGREL 75 MG: 75 TABLET, FILM COATED ORAL at 21:00

## 2017-04-17 RX ADMIN — ABACAVIR 600 MG: 300 TABLET, FILM COATED ORAL at 21:00

## 2017-04-17 RX ADMIN — CALCIUM ACETATE 2001 MG: 667 CAPSULE ORAL at 17:41

## 2017-04-17 RX ADMIN — Medication 1 CAPSULE: at 21:00

## 2017-04-17 RX ADMIN — CALCIUM ACETATE 2001 MG: 667 CAPSULE ORAL at 14:16

## 2017-04-17 NOTE — PROGRESS NOTES
[facility]  Hospitalist Progress Note  Isidro Johnson MD 04/17/2017    Reason for Stay (Diagnosis): Anemia, generalized weakness.         Assessment and Plan:      Summary of Stay: Donald Raza is a 67 year old male with a complicated PMH to include end-stage renal disease on hemodialysis Tuesdays, Thursdays and Saturdays, history of colon cancer, status post hemicolectomy, coronary artery disease, status post PCI to his LAD, hyperlipidemia, hypertension, HIV with most recent CD4 count of 184 admitted on 4/15/2017 with Generalized weakness, bradycardia.    Problem List:   1. Generalized weakness- Multifactorial, deconditioning, anemia, underlying chronic illness- PT/OT.  2. Sinus bradycardia- Resolved. The cause is not clear. Monitor on tele. Cardiology consulted. Held BB.  3. ESRD on HD- Had his scheduled HD on 04/15. Nephrology following.  4. Anemia- Acute on chronic- Due to chronic diseases- Hgb was down to 6.5 and got 2 units PRBC. Blood bank requested Irradiated, as he had irradiated PRBC last admission as well. The reason was not clear at this point, need to try will try to sort out and discuss with his previous provider. I discussed this with blood bank. Patient had his blood transfusion yesterday with HD ( Not his scheduled one).  5. DM II- Continue insuline as ordered, glucose better controlled.  6. HIV infection- Continue his home medications, HAAR medications. Needs follow up as an out patient.    DVT Prophylaxis: Pneumatic Compression Devices.  Code Status: Full Code  Discharge Dispo: Home  Estimated Disch Date / # of Days until Disch: 1 day, awaiting cardiology decision if he needs stress test during this hospitalization. I discussed with him at length the plan of care.        Interval History (Subjective):      Patient seen and examined, feels better today, his energy level improved after transfusion. He gets irradiated blood last time, but did not know the reason.                "   Physical Exam:      Last Vital Signs:  /65 (BP Location: Right arm)  Pulse 63  Temp 96.4  F (35.8  C) (Oral)  Resp 16  Ht 1.803 m (5' 11\")  Wt 89.6 kg (197 lb 8.7 oz)  SpO2 93%  BMI 27.55 kg/m2    I/O last 3 completed shifts:  In: 720 [P.O.:720]  Out: 3650 [Urine:150; Other:3500]  Vitals:    04/15/17 1225 04/15/17 1323 04/16/17 0603 04/16/17 1345   Weight: 96.2 kg (212 lb) 96.2 kg (212 lb 1.3 oz) 90.1 kg (198 lb 9.6 oz) 90.1 kg (198 lb 10.2 oz)    04/17/17 0700   Weight: 89.6 kg (197 lb 8.7 oz)       Constitutional: Awake, alert, cooperative, no apparent distress   Respiratory: Clear to auscultation bilaterally, no crackles or wheezing   Cardiovascular: Regular rate and rhythm, normal S1 and S2, and no murmur noted   Abdomen: Normal bowel sounds, soft, non-distended, non-tender   Skin: No rashes, no cyanosis, dry to touch   Neuro: Alert and oriented x3, no weakness, numbness, memory loss   Extremities: No edema, normal range of motion   Other(s):        All other systems: Negative          Medications:      All current medications were reviewed with changes reflected in problem list.         Data:      All new lab and imaging data was reviewed.   Labs:  No results for input(s): CULT in the last 168 hours.    Recent Labs  Lab 04/16/17  0555 04/15/17  0819 04/11/17  0628    138 139   POTASSIUM 4.3 5.1 4.8   CHLORIDE 101 99 101   CO2 29 26 24   ANIONGAP 9 13 14   * 196* 145*   BUN 44* 73* 139*   CR 5.30* 7.48* 8.39*   GFRESTIMATED 11* 7* 6*   GFRESTBLACK 13* 9* 8*   PRABHA 8.4* 8.6 9.0       Recent Labs  Lab 04/17/17  1110 04/16/17  2200 04/16/17  0555 04/15/17  0819 04/11/17  0628   WBC  --   --  4.3 7.1 9.9   HGB 9.1* 8.6* 6.5* 7.1* 7.3*   HCT  --   --  21.5* 22.4* 23.0*   MCV  --   --  95 93 90   PLT  --   --  112* 114* 181       Recent Labs  Lab 04/17/17  1323 04/17/17  1031 04/17/17  0836 04/17/17  0220 04/16/17  2203  04/16/17  0555  04/15/17  0819  04/11/17  0628   GLC  --   --   --   " --   --   --  192*  --  196*  --  145*   * 178* 88 139* 112*  < >  --   < >  --   < >  --    < > = values in this interval not displayed.   Imaging:   Results for orders placed or performed during the hospital encounter of 04/15/17   Chest XR,  PA & LAT    Narrative    CHEST TWO VIEWS    4/15/2017 9:13 AM     HISTORY: Cough    COMPARISON: Chest x-ray 4/7/2017.      Impression    IMPRESSION: New opacity at the right lung base. This could represent  developing airspace disease including pneumonia. Left chest is  obscured by an overlying object projected over the left mid to low  chest. Borderline cardiomegaly appears larger. Vascular congestion  appears stable.    JONY ESPAÑA MD   CT Head w/o Contrast    Narrative    CT HEAD W/O CONTRAST 4/15/2017 9:04 AM     HISTORY:  headache    TECHNIQUE:  Axial images of the head without IV contrast material.  Radiation dose for this scan was reduced using automated exposure  control, adjustment of the mA and/or kV according to patient size, or  iterative reconstruction technique.    COMPARISON: 5/19/2016    FINDINGS: No intracranial hemorrhage, mass lesion, or acute infarct is  seen. No skull fracture. Atrophy. There is bilateral white matter  low-density, likely related to small vessel ischemic disease. Mild  left maxillary sinus mucosal thickening.       Impression    IMPRESSION:  No acute intracranial pathology.    WOLFGANG ROGER MD

## 2017-04-17 NOTE — PLAN OF CARE
Problem: Goal Outcome Summary  Goal: Goal Outcome Summary  Outcome: Improving  Low grade temp 99.5, recheck temp 98.4. A&O. C/O foot pain, PRN APAP given x1. Tele SR. LUE fistula, positive thrill and bruit. Nephro following, next HD 4/18 per normal schedule. Up with Ax1. Bed alarm on. L/S clear/dimished with crackles in DOMO. 1+ edema in feet and ankles. 0200 . PRN klonopin given x1 per pt request. Cr 5.3. Hgb recheck 8.6, up from 6.5. Plan is echo today. Cardio following, nuclear stress test on hold till hgb  >9. D/c to home 1-2 days if continues to improve.

## 2017-04-17 NOTE — PLAN OF CARE
Problem: Goal Outcome Summary  Goal: Goal Outcome Summary  Outcome: Improving     VSS. Denies pain. Echo completed this shift. Possible discharge today pending cardiology recommendation of stress test inpatient vs outpatient (cardiology paged per hospitalist request). Tele - SR. Will continue to monitor.

## 2017-04-17 NOTE — PROGRESS NOTES
"Cardiology Progress Note  DEDRICK Gary          Assessment and Plan:   Admit (4/15) w/ generalized weakness/shoulder heaviness.  Evidence of significant bradycardia  Recent prolonged hospitalization for Influenza A & CAP.  PMH: ESRD/CAD/HTN/anemia/IDDM/labile HTN/HIV    Bradycardia:  -HRs 30-40 bpm on admit  No syncope  -in setting of significant fluid overload & electrolyte abnormalities  -was on coreg for past few months (now held)  -Improved HRs following dialysis/off bblockers--> 60-70 bpm overnight on tele  -Previous bradycardia on bblocker (5/2016)  -continue to hold bblockers and consider outpt event monitor    ESRD:  -on hemodialysis 3x/wk  -evidence of fluid overload on admission  (BNP 18,000) and underwent extra dialysis (4/16)  -(weight down 15lbs since admit)  212lb--> 197lb today    CAD:  -hx of LAD stenting (2015)/Diag stenting (12/15) and again in (8/16) after NSTEMI  -NL troponin/no significant ST abnormalities/ECHO pending  -fleeting back pain prior to admit--> atypical for his usual angina  No CP since admit  -consider stress  -Not on ASA given anemia.    -continue Imdur/statin/ARB/Plavix till (8/2017)    Anemia:  -intermittent ongoing issue  -hgb 6.5--> s/p 2U PRBC--> 8.6 now  -etiology unclear:  Some concern for small bowel bleed.  ASA has been held    HTN:  -labile in past (with dialysis)  -BP controlled on multiple agents               Interval History:   \"I feel a lot better\"  No CP/back pain  + cough  Denies SOB/palpitations                Medications:       - MEDICATION INSTRUCTIONS for Dialysis Patients -   Does not apply See Admin Instructions     sodium chloride (PF)  3 mL Intracatheter Q8H     abacavir  600 mg Oral QPM     atorvastatin  40 mg Oral At Bedtime     B complex-C-folic acid  1 capsule Oral QPM     calcium acetate  2,001 mg Oral TID w/meals     clopidogrel (PLAVIX) tablet 75 mg  75 mg Oral QPM     darunavir  800 mg Oral At Bedtime     dolutegravir  50 mg Oral At Bedtime     " "gabapentin  300 mg Oral QAM     gabapentin  600 mg Oral QPM     hydrALAZINE (APRESOLINE) tablet 25 mg  25 mg Oral Daily     [START ON 4/18/2017] iron sucrose  50 mg Intravenous Weekly     isosorbide mononitrate  60 mg Oral QPM     losartan  100 mg Oral At Bedtime     magnesium oxide (MAG-OX) tablet 400 mg  400 mg Oral At Bedtime     mycophenolate  500 mg Oral BID     omeprazole  40 mg Oral Daily     ritonavir  100 mg Oral At Bedtime     dapsone  100 mg Oral At Bedtime     insulin aspart  1-7 Units Subcutaneous TID AC     insulin aspart  1-5 Units Subcutaneous At Bedtime     insulin glargine  40 Units Subcutaneous QAM AC     insulin aspart  6 Units Subcutaneous TID w/meals            Physical Exam:   Blood pressure 137/65, pulse 63, temperature 96.4  F (35.8  C), temperature source Oral, resp. rate 16, height 1.803 m (5' 11\"), weight 89.6 kg (197 lb 8.7 oz), SpO2 93 %.  Wt Readings from Last 3 Encounters:   04/17/17 89.6 kg (197 lb 8.7 oz)   04/11/17 96.1 kg (211 lb 12.8 oz)   01/29/17 79.9 kg (176 lb 3.2 oz)     I/O last 3 completed shifts:  In: 1196 [P.O.:900]  Out: 3500 [Other:3500]    CONST:  Alert and oriented  LUNGS:   CTA bilat  CARDIO:  RRR, S1, S2  ABD:  Soft  +BS  EXT:  Trace ankle edema  2+DP bilat           Data:   TELE:  SR    CBC    Recent Labs  Lab 04/16/17  2200 04/16/17  0555 04/15/17  0819   WBC  --  4.3 7.1   HGB 8.6* 6.5* 7.1*   PLT  --  112* 114*       BMP    Recent Labs  Lab 04/16/17  0555 04/15/17  0819 04/11/17  0628    138 139   POTASSIUM 4.3 5.1 4.8   CHLORIDE 101 99 101   PRABHA 8.4* 8.6 9.0   CO2 29 26 24   BUN 44* 73* 139*   CR 5.30* 7.48* 8.39*   * 196* 145*     Recent Labs   Lab Test  01/13/17   0608  01/11/17   0446 09/19/16 02/01/16   0725 07/23/12 0712   CHOL   --    --   126  167   < >  298*   HDL   --    --   21*  44   < >  37*   LDL   --    --   31  47   < >  Cannot estimate LDL when triglyceride exceeds 400 mg/dL   TRIG  1297*  473*  587*  382*   < >  1541* "   CHOLHDLRATIO   --    --    --    --    --   8.1*    < > = values in this interval not displayed.       TROP  Lab Results   Component Value Date    TROPI  04/16/2017     <0.015  The 99th percentile for upper reference range is 0.045 ug/L.  Troponin values in   the range of 0.045 - 0.120 ug/L may be associated with risks of adverse   clinical events.      TROPI 0.019 04/15/2017    TROPI 0.025 03/31/2017    TROPI 0.022 03/30/2017    TROPI 0.023 03/30/2017    TROPONIN 0.02 04/15/2017    TROPONIN 0.02 05/19/2016       BNP    Recent Labs  Lab 04/15/17  0819   [id number]*

## 2017-04-18 VITALS
SYSTOLIC BLOOD PRESSURE: 153 MMHG | DIASTOLIC BLOOD PRESSURE: 79 MMHG | BODY MASS INDEX: 27.69 KG/M2 | WEIGHT: 197.75 LBS | OXYGEN SATURATION: 96 % | RESPIRATION RATE: 16 BRPM | HEART RATE: 63 BPM | HEIGHT: 71 IN | TEMPERATURE: 98 F

## 2017-04-18 LAB
ANION GAP SERPL CALCULATED.3IONS-SCNC: 12 MMOL/L (ref 3–14)
BUN SERPL-MCNC: 49 MG/DL (ref 7–30)
CALCIUM SERPL-MCNC: 8.9 MG/DL (ref 8.5–10.1)
CHLORIDE SERPL-SCNC: 99 MMOL/L (ref 94–109)
CO2 SERPL-SCNC: 28 MMOL/L (ref 20–32)
CREAT SERPL-MCNC: 6.06 MG/DL (ref 0.66–1.25)
ERYTHROCYTE [DISTWIDTH] IN BLOOD BY AUTOMATED COUNT: 16.5 % (ref 10–15)
GFR SERPL CREATININE-BSD FRML MDRD: 9 ML/MIN/1.7M2
GLUCOSE BLDC GLUCOMTR-MCNC: 113 MG/DL (ref 70–99)
GLUCOSE BLDC GLUCOMTR-MCNC: 66 MG/DL (ref 70–99)
GLUCOSE BLDC GLUCOMTR-MCNC: 90 MG/DL (ref 70–99)
GLUCOSE SERPL-MCNC: 99 MG/DL (ref 70–99)
HCT VFR BLD AUTO: 26.2 % (ref 40–53)
HGB BLD-MCNC: 8.2 G/DL (ref 13.3–17.7)
MCH RBC QN AUTO: 28.3 PG (ref 26.5–33)
MCHC RBC AUTO-ENTMCNC: 31.3 G/DL (ref 31.5–36.5)
MCV RBC AUTO: 90 FL (ref 78–100)
PLATELET # BLD AUTO: 110 10E9/L (ref 150–450)
POTASSIUM SERPL-SCNC: 4.4 MMOL/L (ref 3.4–5.3)
RBC # BLD AUTO: 2.9 10E12/L (ref 4.4–5.9)
SODIUM SERPL-SCNC: 139 MMOL/L (ref 133–144)
WBC # BLD AUTO: 5.7 10E9/L (ref 4–11)

## 2017-04-18 PROCEDURE — 36415 COLL VENOUS BLD VENIPUNCTURE: CPT | Performed by: INTERNAL MEDICINE

## 2017-04-18 PROCEDURE — 25000132 ZZH RX MED GY IP 250 OP 250 PS 637: Mod: GY | Performed by: INTERNAL MEDICINE

## 2017-04-18 PROCEDURE — 00000146 ZZHCL STATISTIC GLUCOSE BY METER IP

## 2017-04-18 PROCEDURE — 25000131 ZZH RX MED GY IP 250 OP 636 PS 637: Mod: GY | Performed by: INTERNAL MEDICINE

## 2017-04-18 PROCEDURE — A9270 NON-COVERED ITEM OR SERVICE: HCPCS | Mod: GY | Performed by: INTERNAL MEDICINE

## 2017-04-18 PROCEDURE — 25000128 H RX IP 250 OP 636: Performed by: INTERNAL MEDICINE

## 2017-04-18 PROCEDURE — 90937 HEMODIALYSIS REPEATED EVAL: CPT

## 2017-04-18 PROCEDURE — 63400005 ZZH RX 634: Performed by: INTERNAL MEDICINE

## 2017-04-18 PROCEDURE — 85027 COMPLETE CBC AUTOMATED: CPT | Performed by: INTERNAL MEDICINE

## 2017-04-18 PROCEDURE — 80048 BASIC METABOLIC PNL TOTAL CA: CPT | Performed by: INTERNAL MEDICINE

## 2017-04-18 PROCEDURE — 99239 HOSP IP/OBS DSCHRG MGMT >30: CPT | Performed by: INTERNAL MEDICINE

## 2017-04-18 RX ORDER — ALBUMIN (HUMAN) 12.5 G/50ML
50 SOLUTION INTRAVENOUS
Status: DISCONTINUED | OUTPATIENT
Start: 2017-04-18 | End: 2017-04-18

## 2017-04-18 RX ORDER — AMLODIPINE BESYLATE 2.5 MG/1
2.5 TABLET ORAL DAILY
Qty: 30 TABLET | Refills: 0 | Status: SHIPPED | OUTPATIENT
Start: 2017-04-18 | End: 2017-11-21

## 2017-04-18 RX ORDER — ALBUMIN, HUMAN INJ 5% 5 %
250 SOLUTION INTRAVENOUS
Status: DISCONTINUED | OUTPATIENT
Start: 2017-04-18 | End: 2017-04-18

## 2017-04-18 RX ORDER — AMLODIPINE BESYLATE 2.5 MG/1
2.5 TABLET ORAL DAILY
Status: DISCONTINUED | OUTPATIENT
Start: 2017-04-18 | End: 2017-04-18 | Stop reason: HOSPADM

## 2017-04-18 RX ADMIN — INSULIN GLARGINE 40 UNITS: 100 INJECTION, SOLUTION SUBCUTANEOUS at 07:17

## 2017-04-18 RX ADMIN — SODIUM CHLORIDE 250 ML: 9 INJECTION, SOLUTION INTRAVENOUS at 08:36

## 2017-04-18 RX ADMIN — SODIUM CHLORIDE 1000 ML: 9 INJECTION, SOLUTION INTRAVENOUS at 08:36

## 2017-04-18 RX ADMIN — MYCOPHENOLATE MOFETIL 500 MG: 250 CAPSULE ORAL at 12:55

## 2017-04-18 RX ADMIN — OMEPRAZOLE 40 MG: 20 CAPSULE, DELAYED RELEASE ORAL at 12:56

## 2017-04-18 RX ADMIN — HYDRALAZINE HYDROCHLORIDE 25 MG: 25 TABLET ORAL at 12:56

## 2017-04-18 RX ADMIN — CALCIUM ACETATE 2001 MG: 667 CAPSULE ORAL at 12:56

## 2017-04-18 RX ADMIN — IRON SUCROSE 50 MG: 20 INJECTION, SOLUTION INTRAVENOUS at 09:17

## 2017-04-18 RX ADMIN — AMLODIPINE BESYLATE 2.5 MG: 2.5 TABLET ORAL at 12:56

## 2017-04-18 RX ADMIN — ERYTHROPOIETIN 15000 UNITS: 10000 INJECTION, SOLUTION INTRAVENOUS; SUBCUTANEOUS at 09:17

## 2017-04-18 RX ADMIN — GABAPENTIN 300 MG: 300 CAPSULE ORAL at 12:56

## 2017-04-18 NOTE — PROGRESS NOTES
Pt is a/o x4. VSS, afebrile.tele; SR with elevated T's. Denies pain. Stand by asist.. Dialysis today. Would like to d/c after dialysis. All questions and concerns answered

## 2017-04-18 NOTE — DISCHARGE INSTRUCTIONS
1. Your insulin dose was decreased while you were hospitalized.  Your Lantus was decreased to 36 units every morning.  Once you are home your eating habits may change and you may need to increase your Lantus back to your normal dose.  Please follow up with your primary care doctor in the next few weeks to discuss this.  2. Follow up with Cardiology in clinic in the next week.  You will need to have an event monitor to monitor your heart rate.  3. Your Carvedilol was stopped.  Do not start taking this again as it will cause a low heart rate.  4. You should take Amlodipine 2.5 mg every day. (instead of as needed)

## 2017-04-18 NOTE — PLAN OF CARE
Problem: Goal Outcome Summary  Goal: Goal Outcome Summary  Outcome: Adequate for Discharge Date Met:  04/18/17  Pt. Completed dialysis , ran [id number].  Per Dialysis nurse, 2L off, Epo and Venofer were given.  Pt. Tolerated well, resting comfortably in chair, eating lunch.  Plan for DC at 1400.

## 2017-04-18 NOTE — PROGRESS NOTES
[facility]     Renal Progress Note       SHORTHAND KEY FOR MY NOTES:  c = with, s = without, p = after, a = before, x = except, asx = asymptomatic, tx = transplant or treatment, sx = symptoms or symptomatic, cx = canceled or culture, rxn = reaction, yday = yesterday, nl = normal, abx = antibiotics, fxn = function, dx = diagnosis, dz = disease, m/h = melena/hematochezia, c/d/l/ha = cramping/dizziness/lightheadedness/headache, d/c = discharge or diarrhea/constipation, f/c/n/v = fevers/chills/nausea/vomiting, cp/sob = chest pain/shortness of breath.         Assessment/Plan:     1.  ESKD.  Pt dialysed per TRS schedule. No issues.  A.  Next HD on Thursday.    2.  Anemia. Hb dropped again today.  Unsure why the hb continues to drop.  No need for transfusion.  No obvious bleeding.  A.  Monitor hb, clinically.    3.  Dispo.  Ok to dc home today from renal perspective.          Interval History:     Pt is feeling well and has no complaints.  He is breathing better and tolerated HD s probs.  Denies any bleeding sx.    He is ready to go home.              Medications and Allergies:       [START ON 4/19/2017] insulin glargine  36 Units Subcutaneous QAM AC     insulin aspart  5 Units Subcutaneous TID w/meals     amLODIPine  2.5 mg Oral Daily     - MEDICATION INSTRUCTIONS for Dialysis Patients -   Does not apply See Admin Instructions     sodium chloride (PF)  3 mL Intracatheter Q8H     abacavir  600 mg Oral QPM     atorvastatin  40 mg Oral At Bedtime     B complex-C-folic acid  1 capsule Oral QPM     calcium acetate  2,001 mg Oral TID w/meals     clopidogrel (PLAVIX) tablet 75 mg  75 mg Oral QPM     darunavir  800 mg Oral At Bedtime     dolutegravir  50 mg Oral At Bedtime     gabapentin  300 mg Oral QAM     gabapentin  600 mg Oral QPM     hydrALAZINE (APRESOLINE) tablet 25 mg  25 mg Oral Daily     iron sucrose  50 mg Intravenous Weekly     isosorbide mononitrate  60 mg Oral QPM     losartan  100 mg Oral At Bedtime  "    magnesium oxide (MAG-OX) tablet 400 mg  400 mg Oral At Bedtime     mycophenolate  500 mg Oral BID     omeprazole  40 mg Oral Daily     ritonavir  100 mg Oral At Bedtime     dapsone  100 mg Oral At Bedtime     insulin aspart  1-7 Units Subcutaneous TID AC     insulin aspart  1-5 Units Subcutaneous At Bedtime     Allergies   Allergen Reactions     Lisinopril      Sulfa Drugs           Physical Exam:     Vitals were reviewed    Heart Rate: 71, Blood pressure 153/79, pulse 63, temperature 98  F (36.7  C), resp. rate 16, height 1.803 m (5' 11\"), weight 89.7 kg (197 lb 12 oz), SpO2 96 %.  Wt Readings from Last 3 Encounters:   04/18/17 89.7 kg (197 lb 12 oz)   04/11/17 96.1 kg (211 lb 12.8 oz)   01/29/17 79.9 kg (176 lb 3.2 oz)     Intake/Output Summary (Last 24 hours) at 04/18/17 1430  Last data filed at 04/18/17 1150   Gross per 24 hour   Intake                0 ml   Output             2125 ml   Net            -2125 ml     GENERAL APPEARANCE: pleasant, NAD, alert, sitting in chair eating  HEENT:  Eyes/ears/nose/neck grossly normal  RESP: lungs cta b c good efforts, no crackles, rhonchi or wheezes  CV: RRR, nl S1/S2   ABDOMEN: o/s/nt/nd  EXTREMITIES/SKIN: no significant ble edema  OTHER: + LAF         Data:     CBC RESULTS:     Recent Labs  Lab 04/18/17  0630 04/17/17  1110 04/16/17  2200 04/16/17  0555 04/15/17  0819   WBC 5.7  --   --  4.3 7.1   RBC 2.90*  --   --  2.26* 2.40*   HGB 8.2* 9.1* 8.6* 6.5* 7.1*   HCT 26.2*  --   --  21.5* 22.4*   *  --   --  112* 114*     Basic Metabolic Panel:    Recent Labs  Lab 04/18/17  0630 04/16/17  0555 04/15/17  0819    139 138   POTASSIUM 4.4 4.3 5.1   CHLORIDE 99 101 99   CO2 28 29 26   BUN 49* 44* 73*   CR 6.06* 5.30* 7.48*   GLC 99 192* 196*   PRABHA 8.9 8.4* 8.6     INR  Recent Labs  Lab 04/15/17  0819   INR 1.09      Attestation:   I have reviewed today's relevant vital signs, notes, medications, labs and imaging.    Bipin Chaves MD  [facility] - " Nephrology  [phone]

## 2017-04-18 NOTE — DISCHARGE SUMMARY
[facility]  Discharge Summary  Name: Donald Raza    MRN: [id number]  YOB: 1950    Age: 67 year old  Date of Discharge:  4/18/2017  Date of Admission: 4/15/2017  Primary Care Provider: Aida Thomas  Discharge Physician:  Clemencia Pal MD  Discharging Service:  Hospitalist      Discharge Diagnoses:  1. Diffuse Weakness  2. Sinus Bradycardia  3. ESRD on HD  4. Acute on Chronic Anemia due to AOCD  5. IDDM2  6. HIV     Hospital Course:  Donald Raza is a 67 year old male with past medical history of ESRD on HD (TTS), colon cancer s/p hemicolectomy, CAD, HTN and HIV with recent admission (3/28-4/11) for influenza and PNA who was admitted 4/15/2017 with diffuse weakness and was found to have sinus bradycardia.      Patient was seen by Cardiology during his admission.  He had been on Carvedilol which was stopped.  Bradycardia subsequently resolved.  He did have TTE repeated this admission which is largely unchanged.  He has had gradual volume overload during his last hospitalization.  He did not have any ischemic symptoms and had a negative cath in January.  No further ischemic workup was done which inpatient.  Patient will need an event monitor at discharge and will also need to follow up with cardiology.  On day of discharge Amlodipine scheduled was also started due to hypertension.      Patient was also found to have acute on chronic anemia.  He has AOCD related to his underlying renal disease.  He did have a Hgb of 6.5 and received 2 units PRBC this admission.  Hgb has remained stable and no evidence of a GI source of blood loss.  His Hgb was 8.2 on day of discharge.    His glucose was also low and his Lantus was decreased from 50 units daily to 36 units daily.  His glucose may increase again once he is back on his home diet and will need to follow up with his PCP.     Discharge Disposition:  Discharged to home     Allergies:  Allergies   Allergen Reactions     Lisinopril      Sulfa  "Drugs         Condition on Discharge:  Discharge condition: Good   Discharge vitals: Blood pressure 143/64, pulse 63, temperature 98  F (36.7  C), resp. rate 16, height 1.803 m (5' 11\"), weight 89.7 kg (197 lb 12 oz), SpO2 96 %.   Code status on discharge: Full Code     History of Illness:  See detailed admission note for full details.    Physical Exam:  Blood pressure 143/64, pulse 63, temperature 98  F (36.7  C), resp. rate 16, height 1.803 m (5' 11\"), weight 89.7 kg (197 lb 12 oz), SpO2 96 %.  Wt Readings from Last 1 Encounters:   04/18/17 89.7 kg (197 lb 12 oz)     General: Alert, awake, no acute distress.  HEENT: Normocephalic, atraumatic, eyes anicteric and without scleral injection, EOMI, MMM.  Cardiac: RRR, normal S1, S2.  No m/g/r. No LE edema.  Pulmonary: Normal chest rise, normal work of breathing.  Lungs CTAB without crackles or wheezing  Abdomen: soft, non-tender, non-distended.  Normoactive BS.  No guarding or rebound tenderness.  Extremities: no deformities.  Warm, well perfused.  Skin: no rashes or lesions noted.  Warm and Dry.  Neuro: No focal deficits noted.  Speech clear.  Coordination and strength grossly normal.  Psych: Appropriate affect. Alert and oriented x3    Procedures other than Imaging:  PRBC x2 units  TTE  The visual ejection fraction is estimated at 55-60%.  The right ventricle is mildly dilated.  The left atrium is mildly dilated.  There is mild (1+) mitral regurgitation.  Right ventricular systolic pressure is elevated, consistent with moderate  pulmonary hypertension. Higher compared with previous study.  There is trace to mild aortic regurgitation.  _____________________________________________________________________________  __        Left Ventricle  The left ventricle is normal in size. There is normal left ventricular wall  thickness. The visual ejection fraction is estimated at 55-60%. The  transmitral spectral Doppler flow pattern is suggestive " of  pseudonormalization.     Right Ventricle  The right ventricle is mildly dilated. The right ventricular systolic function  is normal.     Atria  The left atrium is mildly dilated. Right atrial size is normal.     Mitral Valve  There is mild mitral annular calcification. The mitral valve leaflets are  mildly thickened. There is mild (1+) mitral regurgitation.        Tricuspid Valve  Normal tricuspid valve. Right ventricular systolic pressure is elevated,  consistent with moderate pulmonary hypertension. There is trace to mild  tricuspid regurgitation.     Aortic Valve  There is mild trileaflet aortic sclerosis. There is trace to mild aortic  regurgitation.     Pulmonic Valve  Normal pulmonic valve. There is trace to mild pulmonic valvular regurgitation.     Vessels  The aortic root is normal size. The inferior vena cava is not dilated.     Pericardium  There is no pericardial effusion.        Rhythm  The rhythm was normal sinus.     Imaging:  Results for orders placed or performed during the hospital encounter of 04/15/17   Chest XR,  PA & LAT    Narrative    CHEST TWO VIEWS    4/15/2017 9:13 AM     HISTORY: Cough    COMPARISON: Chest x-ray 4/7/2017.      Impression    IMPRESSION: New opacity at the right lung base. This could represent  developing airspace disease including pneumonia. Left chest is  obscured by an overlying object projected over the left mid to low  chest. Borderline cardiomegaly appears larger. Vascular congestion  appears stable.    JONY ESPAÑA MD   CT Head w/o Contrast    Narrative    CT HEAD W/O CONTRAST 4/15/2017 9:04 AM     HISTORY:  headache    TECHNIQUE:  Axial images of the head without IV contrast material.  Radiation dose for this scan was reduced using automated exposure  control, adjustment of the mA and/or kV according to patient size, or  iterative reconstruction technique.    COMPARISON: 5/19/2016    FINDINGS: No intracranial hemorrhage, mass lesion, or acute infarct is  seen. No  skull fracture. Atrophy. There is bilateral white matter  low-density, likely related to small vessel ischemic disease. Mild  left maxillary sinus mucosal thickening.       Impression    IMPRESSION:  No acute intracranial pathology.    WOLFGANG ROGER MD        Consultations:  Consultations This Hospital Stay   CARDIOLOGY IP CONSULT  NEPHROLOGY IP CONSULT  CARE COORDINATOR IP CONSULT     Recent Lab Results:    Recent Labs  Lab 04/18/17  0630 04/17/17  1110 04/16/17  2200 04/16/17  0555 04/15/17  0819   WBC 5.7  --   --  4.3 7.1   HGB 8.2* 9.1* 8.6* 6.5* 7.1*   HCT 26.2*  --   --  21.5* 22.4*   MCV 90  --   --  95 93   *  --   --  112* 114*       Recent Labs  Lab 04/18/17  0630 04/16/17  0555 04/15/17  0819    139 138   POTASSIUM 4.4 4.3 5.1   CHLORIDE 99 101 99   CO2 28 29 26   ANIONGAP 12 9 13   GLC 99 192* 196*   BUN 49* 44* 73*   CR 6.06* 5.30* 7.48*   GFRESTIMATED 9* 11* 7*   GFRESTBLACK 11* 13* 9*   PRABHA 8.9 8.4* 8.6          Pending Results:    Unresulted Labs Ordered in the Past 30 Days of this Admission     No orders found from 2/14/2017 to 4/16/2017.           Discharge Instructions and Follow-Up:   Discharge Orders     MED THERAPY MANAGE REFERRAL     Follow-Up with Cardiac Advanced Practice Provider     Cardiac Event Monitor - Peds/Adult   At discharge     Reason for your hospital stay   You were hospitalized for low heart rate.     Follow-up and recommended labs and tests    Follow up with Cardiology in clinic within one week.     Activity   Your activity upon discharge: activity as tolerated     Full Code     Diet   Follow this diet upon discharge: Orders Placed This Encounter     Snacks/Supplements Adult: Nepro Oral Supplement; Between Meals     Combination Diet Regular Diet Adult; Dialysis Diet       Discharge Medications   Current Discharge Medication List      START taking these medications    Details   !! amLODIPine (NORVASC) 2.5 MG tablet Take 1 tablet (2.5 mg) by mouth daily  Qty: 30  tablet, Refills: 0    Associated Diagnoses: Hypertension secondary to other renal disorders       !! - Potential duplicate medications found. Please discuss with provider.      CONTINUE these medications which have CHANGED    Details   insulin glargine (LANTUS) 100 UNIT/ML injection Inject 36 Units Subcutaneous every morning    Associated Diagnoses: Type 2 diabetes mellitus with chronic kidney disease on chronic dialysis, unspecified long term insulin use status (H)         CONTINUE these medications which have NOT CHANGED    Details   !! amLODIPine (NORVASC) 5 MG tablet Take 5 mg by mouth daily as needed (only uses when blood pressure greater than 150-160)      !! HYDRALAZINE HCL PO Take 25 mg by mouth daily      !! HYDRALAZINE HCL PO Take 25 mg by mouth 2 times daily as needed for high blood pressure      !! LOSARTAN POTASSIUM PO Take 100 mg by mouth daily as needed (with breakfast)      albuterol (PROAIR HFA/PROVENTIL HFA/VENTOLIN HFA) 108 (90 BASE) MCG/ACT Inhaler Inhale 2 puffs into the lungs every 6 hours as needed for shortness of breath / dyspnea or wheezing  Qty: 1 Inhaler, Refills: 1    Associated Diagnoses: Cough      guaiFENesin-dextromethorphan (ROBITUSSIN DM) 100-10 MG/5ML syrup Take 5 mLs by mouth every 4 hours as needed for cough  Qty: 118 mL, Refills: 0    Associated Diagnoses: Cough      Nutritional Supplements (NEPRO PO) 1-3 times daily      omeprazole (PRILOSEC) 40 MG capsule Take 40 mg by mouth daily 1 hour before dinner      B complex-C-folic acid (NEPHROCAPS/TRIPHROCAPS) 1 MG capsule Take 1 capsule by mouth every evening      !! insulin lispro (HUMALOG KWIKPEN) 100 UNIT/ML injection Inject 5 Units Subcutaneous 3 times daily (before meals)    Associated Diagnoses: Type 2 diabetes mellitus with chronic kidney disease on chronic dialysis, unspecified long term insulin use status (H)      !! insulin lispro (HUMALOG KWIKPEN) 100 UNIT/ML injection Inject Subcutaneous 3 times daily (before meals)  Sliding scale - 2 units per 50 over 150      Multiple Vitamins-Minerals (DIALYVITE 800/ULTRA D) TABS Take 1 tablet by mouth daily      mycophenolate (CELLCEPT - GENERIC EQUIVALENT) 500 MG tablet Take 500 mg by mouth 2 times daily On an empty stomach      isosorbide mononitrate (IMDUR) 30 MG 24 hr tablet Take 2 tablets (60 mg) by mouth every evening  Qty: 30 tablet, Refills: 0    Associated Diagnoses: Coronary artery disease involving native heart, angina presence unspecified, unspecified vessel or lesion type; Hypertension secondary to other renal disorders      !! losartan (COZAAR) 100 MG tablet Take 1 tablet (100 mg) by mouth At Bedtime  Qty: 30 tablet, Refills: 0    Associated Diagnoses: Hypertension secondary to other renal disorders      !! gabapentin (NEURONTIN) 300 MG capsule Take 600 mg by mouth every evening      nystatin (MYCOSTATIN) cream Apply topically daily as needed for dry skin      imiquimod (ALDARA) 5 % cream Apply topically as needed      DOXERCALCIFEROL IV Inject 6 mcg into the vein three times a week (with dialysis)      CLONAZEPAM PO Take 0.5 mg by mouth nightly as needed for anxiety (restless legs)      CLOPIDOGREL BISULFATE PO Take 75 mg by mouth every evening       abacavir (ZIAGEN) 300 MG tablet Take 600 mg by mouth every evening       calcium acetate (PHOSLO) 667 MG CAPS 2,001 mg 3 times daily (with meals) Take 3 capsules three times a day with meals      chlorhexidine (CHLORHEXIDINE) 0.12 % solution Rinse and gargle 15 ml by mouth twice a day as directed.      hydrocortisone (ANUCORT-HC) 25 MG suppository Place 25 mg rectally 2 times daily as needed       terbinafine (LAMISIL AT) 1 % cream Apply topically 2 times daily as needed       atorvastatin (LIPITOR) 40 MG tablet Take 40 mg by mouth At Bedtime      epoetin brittni (EPOGEN,PROCRIT) 31813 UNIT/ML injection Inject 11,000 Units Subcutaneous three times a week WITH DIALYSIS      iron sucrose (VENOFER) 20 MG/ML injection Inject 50 mg into  "the vein once a week WIth dialysis      MAGNESIUM OXIDE PO Take 400 mg by mouth At Bedtime      dolutegravir (TIVICAY) 50 MG tablet Take 50 mg by mouth At Bedtime      nitroglycerin (NITROSTAT) 0.4 MG SL tablet One tablet under the tongue every 5 minutes if needed for chest pain. May repeat every 5 minutes for a maximum of 3 doses in 15 minutes\"  Qty: 25 tablet, Refills: 3    Associated Diagnoses: Coronary artery disease due to lipid rich plaque; Status post coronary angioplasty      !! gabapentin (NEURONTIN) 300 MG capsule Take 300 mg by mouth every morning       dapsone 100 MG tablet Take 100 mg by mouth At Bedtime       Darunavir Ethanolate (PREZISTA PO) Take 800 mg by mouth At Bedtime.      ritonavir (NORVIR) 100 MG capsule Take 1 capsule by mouth At Bedtime       order for DME Equipment being ordered: Other: 4WW  Treatment Diagnosis: Decreased activity tolerance  Qty: 1 each, Refills: 0    Associated Diagnoses: SOB (shortness of breath); Generalized muscle weakness       !! - Potential duplicate medications found. Please discuss with provider.      STOP taking these medications       carvedilol (COREG) 12.5 MG tablet Comments:   Reason for Stopping:             Time Spent on this Encounter   I, Clemencia Pal, personally saw the patient today and spent greater than 30 minutes discharging this patient.    Clemencia Pal MD    "

## 2017-04-18 NOTE — PROGRESS NOTES
Discussed with Dr. Diaz- pt's outpatient cardiologist. Agrees with refraining from stress testing at this time.   He recommended addition of amlodipine for BP control- I have added 2.5 mg daily.  He agrees with outpatient event recorder. The patient has f/u arranged 5/13.   I would also recommend attempting to maintain Hb >8 (continues to drift down) and ensuring no other loss such as GI.   Please call with questions

## 2017-04-18 NOTE — PROGRESS NOTES
Per Cardiopulmonary, can place cardiac event monitor just prior to patient discharge.  MD paged to verify.  Would need order for this in epic, not DC orders.

## 2017-04-18 NOTE — PROGRESS NOTES
Potassium   Date Value Ref Range Status   04/18/2017 4.4 3.4 - 5.3 mmol/L Final   ]  Lab Results   Component Value Date    HGB 8.2 04/18/2017     Weight: 89.7 kg (197 lb 12 oz)    DIALYSIS PROCEDURE NOTE    Patient dialyzed for 3.5 hrs on a 3 K bath with a  net fluid removal of 2L.  A BFR of 450ml/min was obtained via LUAF and all connections secured.   Patient discussed with  during treatment.  Total heparin received during treatment:: 0units.   Meds given:EPO, Venofer. Complications:none. Goal decr due to cramping. Wts very undependable.   Procedure and ESRD teaching done and questions answered.  See flowsheet in EPIC for further details.    RO log completed  Prime given: NS  Saline double clamped and Arterial/Venous parameters set.   Patient transported via wc to the dialysis unit   Aseptic prep done for both on/off.  Transducer connectors checked q15 minutes with vital sign check  :  Report received from: VIDYA Low RN  Report given to: VIDYA Low RN  Outpatient Dialysis at  [place]    Hepatitis Antigen neg  Hepatitis Antibody immune 1/18/17

## 2017-04-19 ENCOUNTER — TELEPHONE (OUTPATIENT)
Dept: PHARMACY | Facility: OTHER | Age: 67
End: 2017-04-19

## 2017-04-19 ENCOUNTER — HOSPITAL ENCOUNTER (OUTPATIENT)
Dept: CARDIOLOGY | Facility: CLINIC | Age: 67
Discharge: HOME | End: 2017-04-19
Attending: INTERNAL MEDICINE | Admitting: INTERNAL MEDICINE
Payer: MEDICARE

## 2017-04-19 ENCOUNTER — CARE COORDINATION (OUTPATIENT)
Dept: CARDIOLOGY | Facility: CLINIC | Age: 67
End: 2017-04-19

## 2017-04-19 DIAGNOSIS — R00.1 SINUS BRADYCARDIA: ICD-10-CM

## 2017-04-19 PROCEDURE — 93270 REMOTE 30 DAY ECG REV/REPORT: CPT | Performed by: INTERNAL MEDICINE

## 2017-04-19 PROCEDURE — 93272 ECG/REVIEW INTERPRET ONLY: CPT | Performed by: INTERNAL MEDICINE

## 2017-04-19 NOTE — PROGRESS NOTES
Called patient and left  to discuss any post hospital d/c questions he may have, review medication changes, and confirm f/u appts. RN advised patient in  that he has an apt scheduled on 5/15/17 with Dr. Diaz. Patient advised in  to call clinic with any cardiac related questions or concerns prior to his apt on 5/15/17.

## 2017-04-19 NOTE — PROGRESS NOTES
Hand-off for Care Transitions to Next Level of Care Provider  Name: Donald Raza  : 1950  MRN #: [id number]  Reason for Hospitalization:  Sinus bradycardia [R00.1]  Generalized muscle weakness [M62.81]  ESRD (end stage renal disease) on dialysis (H) [N18.6, Z99.2]  Acute nonintractable headache, unspecified headache type [R51]  Anemia, unspecified type [D64.9]  Admit Date/Time: 4/15/2017  8:01 AM  Discharge Date:  2017    Reason for Communication Hand-off Referral: Fragility  Other Bradycardia    Discharge Plan:  Discharged to: Home with support                   Patient agreeable to post-hospital support suggestions:  Yes  Discharge Plan:   Home with support           Patient is on new medications:   No           MTM follow up recommended: Yes           Tel-[employer] program:  Declined          Follow-up specialty is recommended: Yes. Follow up with [facility] as scheduled. Also, Medication Therapy Management Referral has been made.   Follow-up plan:  Future Appointments  Date Time Provider Department Center   5/15/2017 8:45 AM Arnoldo Diaz MD [facility]   2017 8:40 AM Hal Rodriguez MD [facility]     Any outstanding tests or procedures:  No.       Referrals     Future Labs/Procedures    Follow-Up with Cardiac Advanced Practice Provider           Key Recommendations:  Patient was discharged on a regular dialysis diet with Nepro Oral Supplements between meals. Please assess his compliance. Also, his insulin dose was decreased while hospitalized. Lantus is now 36 units every AM. Once his eating habits resume, he may need to increase it again. Please assess this. His Coreg was stopped d/t bradycardia. He was started on Amlodipine 2.5mg daily instead of Coreg. He will need to be placed with an event monitor for his heart rate.     Communicated handoff via [place] [facility] [place] Aida Thomas's CC at [phone].      Ruby Hinojosa RN, BSN, CTS  [facility]  [phone]

## 2017-04-19 NOTE — TELEPHONE ENCOUNTER
MTM referral from: Transitions of Care (recent hospital discharge or ED visit)    MTM referral outreach attempt #1 on April 19, 2017 at 11:04 AM      Outcome: Patient is not interested at this time because they are not a [place] patient. Emailed Katarina Rascon MTM referral info for follow up, will route to MTM Pharmacist/Provider as an FYI. Thank you for the referral.     Peri Moore MTM Coordinator

## 2017-04-21 ENCOUNTER — DOCUMENTATION ONLY (OUTPATIENT)
Dept: CARDIOLOGY | Facility: CLINIC | Age: 67
End: 2017-04-21

## 2017-04-21 NOTE — PROGRESS NOTES
Baseline Cardionet transmission from 4/19 shows SR at 72 bpm. Patient was in hospital for bradycardia. Beta blocker was held on 4/17- sees Dr. Diaz in fu on 5/15. Abena

## 2017-05-03 ENCOUNTER — DOCUMENTATION ONLY (OUTPATIENT)
Dept: CARDIOLOGY | Facility: CLINIC | Age: 67
End: 2017-05-03

## 2017-05-03 NOTE — PROGRESS NOTES
Cardionet transmission from 4/30-symptomatic showing SR with isolated PVC-Rate 80 bpm-No activity or symptoms indicated. Return visit 5/15 with Dr. Diaz. Abena

## 2017-05-15 ENCOUNTER — TELEPHONE (OUTPATIENT)
Dept: CARDIOLOGY | Facility: CLINIC | Age: 67
End: 2017-05-15

## 2017-05-15 ENCOUNTER — OFFICE VISIT (OUTPATIENT)
Dept: CARDIOLOGY | Facility: CLINIC | Age: 67
End: 2017-05-15
Attending: INTERNAL MEDICINE
Payer: COMMERCIAL

## 2017-05-15 VITALS
SYSTOLIC BLOOD PRESSURE: 150 MMHG | WEIGHT: 191 LBS | BODY MASS INDEX: 26.74 KG/M2 | HEIGHT: 71 IN | HEART RATE: 72 BPM | DIASTOLIC BLOOD PRESSURE: 74 MMHG

## 2017-05-15 DIAGNOSIS — E78.2 MIXED HYPERLIPIDEMIA: ICD-10-CM

## 2017-05-15 DIAGNOSIS — I25.10 CORONARY ARTERY DISEASE INVOLVING NATIVE CORONARY ARTERY OF NATIVE HEART WITHOUT ANGINA PECTORIS: Primary | ICD-10-CM

## 2017-05-15 PROCEDURE — 99214 OFFICE O/P EST MOD 30 MIN: CPT | Performed by: INTERNAL MEDICINE

## 2017-05-15 NOTE — LETTER
5/15/2017    Cristina Ann Baker, MD [facility]   [address] [facility] [address]    RE: Donald Guevaraido       Dear Colleague,    I had the pleasure of seeing Donald Raza in the [facility].    REASON FOR CLINIC VISIT:  Followup CAD.      Mr. Raza is a very pleasant 67-year-old gentleman with history of CAD with history of mid-LAD drug-eluting stent PCI in 06/2015.  At that time, he had an inferior branch diagonal with obstructive disease which was medically managed and subsequently had another non-STEMI in 12/2015, and was found to have tight ostial diagonal stenosis that was treated with a drug-eluting stent PCI and LAD was dilated with a balloon in kissing fashion.  Subsequently, in 05/2016, he had other non-STEMI and was found to have in-stent restenosis of the diagonal and he underwent cutting balloon angioplasty for the same.  Then, he had a non-STEMI back in 08/2016, and was found to have diagonal in-stent restenosis, dilated and then stented with 3 x 8 Xience Alpine stent.  He then was admitted several times since then, the first in early January with non-STEMI and repeat coronary angiogram showed patent stents.  He then was admitted in late January with nausea, vomiting, diarrhea and in March with fever and influenza and then last month because of weakness and was found to have bradycardia and carvedilol was stopped.  He was discharged on an event monitor which he is still on and there was some CardioNet transmission from 04/30 that showed sinus rhythm with isolated PVCs.  Today, he is coming in routine followup.  Overall, he tells me he is doing quite well.  He is using amlodipine on a p.r.n. basis for blood pressure control.  In the past, he had hypotension on the days of dialysis.  He denies any chest discomfort or shortness of breath.  Remarkably, in January this year, he had a lipid panel that showed significantly elevated triglycerides at 297.  He is  on aspirin and Plavix.  He is also on amlodipine 2.5 mg daily and uses 5 mg as needed when blood pressures greater than 150 systolic.  He also takes hydralazine, insulin, Imdur, Lipitor.  LDL last year was 31.  Echocardiogram done last month showed normal LV function with mild mitral regurgitation.      PHYSICAL EXAMINATION:   VITAL SIGNS:  Blood pressure 150/74, heart rate 70 and regular, weight 191 pounds, BMI 26.69.   GENERAL:  The patient appears pleasant, comfortable.   NECK:  Normal JVP, no bruit.   CARDIOVASCULAR SYSTEM:  S1, S2 normal, no murmur, rub or gallop.   RESPIRATORY SYSTEM:  Clear to auscultation bilaterally.   ABDOMEN:  Soft, nontender.   EXTREMITIES:  No pitting pedal edema.   NEUROLOGICAL:  Alert and oriented x3.   PSYCH:  Normal affect.   SKIN:  No obvious rash.   HEENT:  No pallor or icterus.     Outpatient Encounter Prescriptions as of 5/15/2017   Medication Sig Dispense Refill     insulin glargine (LANTUS) 100 UNIT/ML injection Inject 36 Units Subcutaneous every morning       amLODIPine (NORVASC) 2.5 MG tablet Take 1 tablet (2.5 mg) by mouth daily 30 tablet 0     amLODIPine (NORVASC) 5 MG tablet Take 5 mg by mouth daily as needed (only uses when blood pressure greater than 150-160)       HYDRALAZINE HCL PO Take 25 mg by mouth 2 times daily as needed for high blood pressure       LOSARTAN POTASSIUM PO Take 100 mg by mouth daily as needed (with breakfast)       [DISCONTINUED] HYDRALAZINE HCL PO Take 25 mg by mouth daily       albuterol (PROAIR HFA/PROVENTIL HFA/VENTOLIN HFA) 108 (90 BASE) MCG/ACT Inhaler Inhale 2 puffs into the lungs every 6 hours as needed for shortness of breath / dyspnea or wheezing 1 Inhaler 1     guaiFENesin-dextromethorphan (ROBITUSSIN DM) 100-10 MG/5ML syrup Take 5 mLs by mouth every 4 hours as needed for cough 118 mL 0     Nutritional Supplements (NEPRO PO) 1-3 times daily       omeprazole (PRILOSEC) 40 MG capsule Take 40 mg by mouth daily 1 hour before dinner       B  complex-C-folic acid (NEPHROCAPS/TRIPHROCAPS) 1 MG capsule Take 1 capsule by mouth every evening       insulin lispro (HUMALOG KWIKPEN) 100 UNIT/ML injection Inject 5 Units Subcutaneous 3 times daily (before meals)       order for DME Equipment being ordered: Other: 4WW  Treatment Diagnosis: Decreased activity tolerance 1 each 0     insulin lispro (HUMALOG KWIKPEN) 100 UNIT/ML injection Inject Subcutaneous 3 times daily (before meals) Sliding scale - 2 units per 50 over 150       Multiple Vitamins-Minerals (DIALYVITE 800/ULTRA D) TABS Take 1 tablet by mouth daily       mycophenolate (CELLCEPT - GENERIC EQUIVALENT) 500 MG tablet Take 500 mg by mouth 2 times daily On an empty stomach       isosorbide mononitrate (IMDUR) 30 MG 24 hr tablet Take 2 tablets (60 mg) by mouth every evening 30 tablet 0     losartan (COZAAR) 100 MG tablet Take 1 tablet (100 mg) by mouth At Bedtime 30 tablet 0     gabapentin (NEURONTIN) 300 MG capsule Take 600 mg by mouth every evening       nystatin (MYCOSTATIN) cream Apply topically daily as needed for dry skin       imiquimod (ALDARA) 5 % cream Apply topically as needed       DOXERCALCIFEROL IV Inject 6 mcg into the vein three times a week (with dialysis)       CLONAZEPAM PO Take 0.5 mg by mouth nightly as needed for anxiety (restless legs)       CLOPIDOGREL BISULFATE PO Take 75 mg by mouth every evening        abacavir (ZIAGEN) 300 MG tablet Take 600 mg by mouth every evening        calcium acetate (PHOSLO) 667 MG CAPS 2,001 mg 3 times daily (with meals) Take 3 capsules three times a day with meals       chlorhexidine (CHLORHEXIDINE) 0.12 % solution Rinse and gargle 15 ml by mouth twice a day as directed.       hydrocortisone (ANUCORT-HC) 25 MG suppository Place 25 mg rectally 2 times daily as needed        terbinafine (LAMISIL AT) 1 % cream Apply topically 2 times daily as needed        atorvastatin (LIPITOR) 40 MG tablet Take 40 mg by mouth At Bedtime       epoetin brittni (EPOGEN,PROCRIT)  "13124 UNIT/ML injection Inject 11,000 Units Subcutaneous three times a week WITH DIALYSIS       iron sucrose (VENOFER) 20 MG/ML injection Inject 50 mg into the vein once a week WIth dialysis       MAGNESIUM OXIDE PO Take 400 mg by mouth At Bedtime       dolutegravir (TIVICAY) 50 MG tablet Take 50 mg by mouth At Bedtime       gabapentin (NEURONTIN) 300 MG capsule Take 300 mg by mouth every morning        dapsone 100 MG tablet Take 100 mg by mouth At Bedtime        Darunavir Ethanolate (PREZISTA PO) Take 800 mg by mouth At Bedtime.       ritonavir (NORVIR) 100 MG capsule Take 1 capsule by mouth At Bedtime        nitroglycerin (NITROSTAT) 0.4 MG SL tablet One tablet under the tongue every 5 minutes if needed for chest pain. May repeat every 5 minutes for a maximum of 3 doses in 15 minutes\" 25 tablet 3     No facility-administered encounter medications on file as of 5/15/2017.       IMPRESSION AND PLAN:  A very delightful 67-year-old gentleman with history of CAD with several non-STEMIs with last one in 08/2016 when he underwent diagonal in-stent restenosis with drug eluting PCI with Xience Alpine stent.  He has normal LV function.  He has other comorbidities of hypertension, end-stage renal dialysis on dialysis for the last 3 years, hypertension, diabetes, history of bradycardia on carvedilol and history of intermittent hypertension with labile blood pressure.  Overall, cardiovascular status wise he is doing reasonably well.  He does not appear to have any anginal symptoms.  His blood pressure is reasonably controlled at this time, I am not recommending any more aggressive blood pressure control as this usually leads to hypotension with adverse outcomes on the days of dialysis.  For now, he is using a combination of scheduled antihypertensive medication and as needed with reasonably well controlled low blood pressure.  His triglycerides were quite high and I recommend checking a fasting lipid panel.  I am recommending " follow up in 1 month's time with an RENETTA to make sure that cardiovascular status wise he is stable.  I would recommend continuing dual antiplatelet therapy at least until 08/2017 and even beyond given his aggressive nature of coronary artery disease.     Again, thank you for allowing me to participate in the care of your patient.      Sincerely,    Arnoldo Diaz MD     [facility]

## 2017-05-15 NOTE — MR AVS SNAPSHOT
After Visit Summary   5/15/2017    Donald Raza    MRN: [id number]           Patient Information     Date Of Birth          1950        Visit Information        Provider Department      5/15/2017 8:45 AM Arnoldo Diaz MD [facility]        Today's Diagnoses     Coronary artery disease involving native coronary artery of native heart without angina pectoris    -  1    Coronary artery disease        Mixed hyperlipidemia           Follow-ups after your visit        Additional Services     Follow-Up with Cardiac Advanced Practice Provider                 Your next 10 appointments already scheduled     May 16, 2017  8:40 AM CDT   (Arrive by 8:25 AM)   RETURN WAIT LIST with Hal Rodriguez MD   [facility] Trans[facility] ([facility])    [address]  [address] [facility] [address] [phone]            Jun 05, 2017 10:00 AM CDT   LAB with RU LAB   [facility] ([facility])    [address]  [place] [id number] [phone]           Patient must bring picture ID.  Patient should be prepared to give a urine specimen  Please do not eat 10-12 hours before your appointment if you are coming in fasting for labs on lipids, cholesterol, or glucose (sugar).  Pregnant women should follow their Care Team instructions. Water with medications is okay. Do not drink coffee or other fluids.   If you have concerns about taking  your medications, please ask at office or if scheduling via [employer], send a message by clicking on Secure Messaging, Message Your Care Team.            Jun 07, 2017  2:30 PM CDT   Return Visit with DEDRICK Glaser CNP   [facility] ([facility])    [address]  [place] [id number] [phone]            Aug 09, 2017  8:00 AM CDT   Ech Complete with [id number]    [facility] ([facility])    [address]  [place] [address]   [phone]           1.  Please bring or wear a comfortable two-piece outfit. 2.  You may eat, drink and take your normal medicines. 3.  For any questions that cannot be answered, please contact the ordering physician ***Please check-in at the [facility] Registration Office located in [address] in the [facility] building. When you are finished registering, please go to [address] and have a seat. The technician will call your name for the test.            Aug 09, 2017  9:00 AM CDT   LAB with RU LAB   [facility] ([facility])    [address]  [place] [id number] [phone]           Patient must bring picture ID.  Patient should be prepared to give a urine specimen  Please do not eat 10-12 hours before your appointment if you are coming in fasting for labs on lipids, cholesterol, or glucose (sugar).  Pregnant women should follow their Care Team instructions. Water with medications is okay. Do not drink coffee or other fluids.   If you have concerns about taking  your medications, please ask at office or if scheduling via [email or web address], send a message by clicking on Secure Messaging, Message Your Care Team.            Aug 10, 2017  3:15 PM CDT   Return Visit with Arnoldo Diaz MD   [facility] ([facility])    [address]  [place] [id number] [phone]              Future tests that were ordered for you today     Open Future Orders        Priority Expected Expires Ordered    Follow-Up with Cardiac Advanced Practice Provider Routine 5/22/2017 5/15/2018 5/15/2017    Lipid Profile Routine 5/22/2017 5/15/2018 5/15/2017    ALT Routine 5/22/2017 5/15/2018 5/15/2017            Who to contact     If you have questions or need follow up  "information about today's clinic visit or your schedule please contact [facility] [place] directly at [phone].  Normal or non-critical lab and imaging results will be communicated to you by MyChart, letter or phone within 4 business days after the clinic has received the results. If you do not hear from us within 7 days, please contact the clinic through MyColorScreenhart or phone. If you have a critical or abnormal lab result, we will notify you by phone as soon as possible.  Submit refill requests through [employer]a or call your pharmacy and they will forward the refill request to us. Please allow 3 business days for your refill to be completed.          Additional Information About Your Visit        [employer] Information     Xoinka lets you send messages to your doctor, view your test results, renew your prescriptions, schedule appointments and more. To sign up, go to [email or web address] . Click on \"Log in\" on the left side of the screen, which will take you to the Welcome page. Then click on \"Sign up Now\" on the right side of the page.     You will be asked to enter the access code listed below, as well as some personal information. Please follow the directions to create your username and password.     Your access code is: [email or web address]  Expires: 2017 10:31 AM     Your access code will  in 90 days. If you need help or a new code, please call your [facility] clinic or [phone].        Care EveryWhere ID     This is your Care EveryWhere ID. This could be used by other organizations to access your [facility] medical records  [id number]        Your Vitals Were     Pulse Height BMI (Body Mass Index)             72 1.803 m (5' 11\") 26.64 kg/m2          Blood Pressure from Last 3 Encounters:   05/15/17 150/74   17 153/79   17 146/62    Weight from Last 3 Encounters:   05/15/17 86.6 kg (191 lb)   17 89.7 kg (197 lb 12 oz)   17 96.1 kg (211 lb 12.8 oz)    "           We Performed the Following     Follow-Up with Cardiologist        Primary Care Provider Office Phone # Fax #    Aida Thomas [phone]       TAYLOR CORETTAVASU [address] [address]        Thank you!     Thank you for choosing [facility] [place]  for your care. Our goal is always to provide you with excellent care. Hearing back from our patients is one way we can continue to improve our services. Please take a few minutes to complete the written survey that you may receive in the mail after your visit with us. Thank you!             Your Updated Medication List - Protect others around you: Learn how to safely use, store and throw away your medicines at www.disposemymeds.org.          This list is accurate as of: 5/15/17  9:47 AM.  Always use your most recent med list.                   Brand Name Dispense Instructions for use    abacavir 300 MG tablet    ZIAGEN     Take 600 mg by mouth every evening       albuterol 108 (90 BASE) MCG/ACT Inhaler    PROAIR HFA/PROVENTIL HFA/VENTOLIN HFA    1 Inhaler    Inhale 2 puffs into the lungs every 6 hours as needed for shortness of breath / dyspnea or wheezing       * amLODIPine 5 MG tablet    NORVASC     Take 5 mg by mouth daily as needed (only uses when blood pressure greater than 150-160)       * amLODIPine 2.5 MG tablet    NORVASC    30 tablet    Take 1 tablet (2.5 mg) by mouth daily       ANUCORT-HC 25 MG Suppository   Generic drug:  hydrocortisone      Place 25 mg rectally 2 times daily as needed       atorvastatin 40 MG tablet    LIPITOR     Take 40 mg by mouth At Bedtime       B complex-C-folic acid 1 MG capsule      Take 1 capsule by mouth every evening       calcium acetate 667 MG Caps capsule    PHOSLO     2,001 mg 3 times daily (with meals) Take 3 capsules three times a day with meals       chlorhexidine 0.12 % solution    PERIDEX     Rinse and gargle 15 ml by mouth twice a day as  directed.       CLONAZEPAM PO      Take 0.5 mg by mouth nightly as needed for anxiety (restless legs)       CLOPIDOGREL BISULFATE PO      Take 75 mg by mouth every evening       dapsone 100 MG tablet      Take 100 mg by mouth At Bedtime       DIALYVITE 800/ULTRA D Tabs      Take 1 tablet by mouth daily       dolutegravir 50 MG tablet    TIVICAY     Take 50 mg by mouth At Bedtime       DOXERCALCIFEROL IV      Inject 6 mcg into the vein three times a week (with dialysis)       epoetin brittni 14062 UNIT/ML injection    EPOGEN/PROCRIT     Inject 11,000 Units Subcutaneous three times a week WITH DIALYSIS       * gabapentin 300 MG capsule    NEURONTIN     Take 300 mg by mouth every morning       * gabapentin 300 MG capsule    NEURONTIN     Take 600 mg by mouth every evening       guaiFENesin-dextromethorphan 100-10 MG/5ML syrup    ROBITUSSIN DM    118 mL    Take 5 mLs by mouth every 4 hours as needed for cough       * HumaLOG KWIKpen 100 UNIT/ML injection   Generic drug:  insulin lispro      Inject Subcutaneous 3 times daily (before meals) Sliding scale - 2 units per 50 over 150       * insulin lispro 100 UNIT/ML injection    HumaLOG KWIKpen     Inject 5 Units Subcutaneous 3 times daily (before meals)       * HYDRALAZINE HCL PO      Take 25 mg by mouth daily       * HYDRALAZINE HCL PO      Take 25 mg by mouth 2 times daily as needed for high blood pressure       imiquimod 5 % cream    ALDARA     Apply topically as needed       insulin glargine 100 UNIT/ML injection    LANTUS     Inject 36 Units Subcutaneous every morning       iron sucrose 20 MG/ML injection    VENOFER     Inject 50 mg into the vein once a week WIth dialysis       isosorbide mononitrate 30 MG 24 hr tablet    IMDUR    30 tablet    Take 2 tablets (60 mg) by mouth every evening       lamISIL AT 1 % cream   Generic drug:  terbinafine      Apply topically 2 times daily as needed       * LOSARTAN POTASSIUM PO      Take 100 mg by mouth daily as needed (with  "breakfast)       * losartan 100 MG tablet    COZAAR    30 tablet    Take 1 tablet (100 mg) by mouth At Bedtime       MAGNESIUM OXIDE PO      Take 400 mg by mouth At Bedtime       mycophenolate 500 MG tablet    CELLCEPT - GENERIC EQUIVALENT     Take 500 mg by mouth 2 times daily On an empty stomach       NEPRO PO      1-3 times daily       nitroglycerin 0.4 MG sublingual tablet    NITROSTAT    25 tablet    One tablet under the tongue every 5 minutes if needed for chest pain. May repeat every 5 minutes for a maximum of 3 doses in 15 minutes\"       nystatin cream    MYCOSTATIN     Apply topically daily as needed for dry skin       order for DME     1 each    Equipment being ordered: Other: 4WW Treatment Diagnosis: Decreased activity tolerance       PREZISTA PO      Take 800 mg by mouth At Bedtime.       priLOSEC 40 MG capsule   Generic drug:  omeprazole      Take 40 mg by mouth daily 1 hour before dinner       ritonavir 100 MG capsule    NORVIR     Take 1 capsule by mouth At Bedtime       * Notice:  This list has 10 medication(s) that are the same as other medications prescribed for you. Read the directions carefully, and ask your doctor or other care provider to review them with you.      "

## 2017-05-15 NOTE — PROGRESS NOTES
HPI and Plan:   See dictation(#[id number])    Orders Placed This Encounter   Procedures     Lipid Profile     ALT     Follow-Up with Cardiac Advanced Practice Provider       No orders of the defined types were placed in this encounter.      There are no discontinued medications.      Encounter Diagnoses   Name Primary?     Coronary artery disease      Mixed hyperlipidemia      Coronary artery disease involving native coronary artery of native heart without angina pectoris Yes       CURRENT MEDICATIONS:  Current Outpatient Prescriptions   Medication Sig Dispense Refill     insulin glargine (LANTUS) 100 UNIT/ML injection Inject 36 Units Subcutaneous every morning       amLODIPine (NORVASC) 2.5 MG tablet Take 1 tablet (2.5 mg) by mouth daily 30 tablet 0     amLODIPine (NORVASC) 5 MG tablet Take 5 mg by mouth daily as needed (only uses when blood pressure greater than 150-160)       HYDRALAZINE HCL PO Take 25 mg by mouth daily       HYDRALAZINE HCL PO Take 25 mg by mouth 2 times daily as needed for high blood pressure       LOSARTAN POTASSIUM PO Take 100 mg by mouth daily as needed (with breakfast)       albuterol (PROAIR HFA/PROVENTIL HFA/VENTOLIN HFA) 108 (90 BASE) MCG/ACT Inhaler Inhale 2 puffs into the lungs every 6 hours as needed for shortness of breath / dyspnea or wheezing 1 Inhaler 1     guaiFENesin-dextromethorphan (ROBITUSSIN DM) 100-10 MG/5ML syrup Take 5 mLs by mouth every 4 hours as needed for cough 118 mL 0     Nutritional Supplements (NEPRO PO) 1-3 times daily       omeprazole (PRILOSEC) 40 MG capsule Take 40 mg by mouth daily 1 hour before dinner       B complex-C-folic acid (NEPHROCAPS/TRIPHROCAPS) 1 MG capsule Take 1 capsule by mouth every evening       insulin lispro (HUMALOG KWIKPEN) 100 UNIT/ML injection Inject 5 Units Subcutaneous 3 times daily (before meals)       order for DME Equipment being ordered: Other: 4WW  Treatment Diagnosis: Decreased activity tolerance 1 each 0     insulin lispro (HUMALOG  KWIKPEN) 100 UNIT/ML injection Inject Subcutaneous 3 times daily (before meals) Sliding scale - 2 units per 50 over 150       Multiple Vitamins-Minerals (DIALYVITE 800/ULTRA D) TABS Take 1 tablet by mouth daily       mycophenolate (CELLCEPT - GENERIC EQUIVALENT) 500 MG tablet Take 500 mg by mouth 2 times daily On an empty stomach       isosorbide mononitrate (IMDUR) 30 MG 24 hr tablet Take 2 tablets (60 mg) by mouth every evening 30 tablet 0     losartan (COZAAR) 100 MG tablet Take 1 tablet (100 mg) by mouth At Bedtime 30 tablet 0     gabapentin (NEURONTIN) 300 MG capsule Take 600 mg by mouth every evening       nystatin (MYCOSTATIN) cream Apply topically daily as needed for dry skin       imiquimod (ALDARA) 5 % cream Apply topically as needed       DOXERCALCIFEROL IV Inject 6 mcg into the vein three times a week (with dialysis)       CLONAZEPAM PO Take 0.5 mg by mouth nightly as needed for anxiety (restless legs)       CLOPIDOGREL BISULFATE PO Take 75 mg by mouth every evening        abacavir (ZIAGEN) 300 MG tablet Take 600 mg by mouth every evening        calcium acetate (PHOSLO) 667 MG CAPS 2,001 mg 3 times daily (with meals) Take 3 capsules three times a day with meals       chlorhexidine (CHLORHEXIDINE) 0.12 % solution Rinse and gargle 15 ml by mouth twice a day as directed.       hydrocortisone (ANUCORT-HC) 25 MG suppository Place 25 mg rectally 2 times daily as needed        terbinafine (LAMISIL AT) 1 % cream Apply topically 2 times daily as needed        atorvastatin (LIPITOR) 40 MG tablet Take 40 mg by mouth At Bedtime       epoetin brittni (EPOGEN,PROCRIT) 56461 UNIT/ML injection Inject 11,000 Units Subcutaneous three times a week WITH DIALYSIS       iron sucrose (VENOFER) 20 MG/ML injection Inject 50 mg into the vein once a week WIth dialysis       MAGNESIUM OXIDE PO Take 400 mg by mouth At Bedtime       dolutegravir (TIVICAY) 50 MG tablet Take 50 mg by mouth At Bedtime       gabapentin (NEURONTIN) 300 MG  "capsule Take 300 mg by mouth every morning        dapsone 100 MG tablet Take 100 mg by mouth At Bedtime        Darunavir Ethanolate (PREZISTA PO) Take 800 mg by mouth At Bedtime.       ritonavir (NORVIR) 100 MG capsule Take 1 capsule by mouth At Bedtime        nitroglycerin (NITROSTAT) 0.4 MG SL tablet One tablet under the tongue every 5 minutes if needed for chest pain. 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, recurrent     Bradycardia 5/28/2016     CAD S/P percutaneous coronary angioplasty 6/15/2015     Chest pain      CKD (chronic kidney disease)     Hemodialysis     Coronary artery disease     stents     Dilated aortic root (H) 5/6/2016     ESRD (end stage renal disease) on dialysis (H) 5/6/2016     Human immunodeficiency virus (HIV) disease (H)      Hypertension 2010     Hypotension, unspecified hypotension type 5/22/2016     Impotence of organic origin      Increased prostate specific antigen (PSA) velocity 08/08/2016    Awaiting bx on blood thinner     Mixed hyperlipidemia      Near syncope 2016    with hemodialysis     NSTEMI (non-ST elevated myocardial infarction) (H) 12/2015, 5/2016     Pulmonary HTN (H)     Mod     TIA (transient ischemic attack) 5/2016     Type 2 diabetes mellitus (H) age 50     Unstable angina (H) 3/4/2016       PAST SURGICAL HISTORY:  Past Surgical History:   Procedure Laterality Date     ANGIOGRAM  03-04-16    No culprit lesions, stents widely patent      ANGIOGRAM  05-06-16    Cutting balloon ptca=Diag     APPENDECTOMY  2000     CHOLECYSTECTOMY, LAPOROSCOPIC       COLOSTOMY  09/30/1999    Temporary for diverticulitis     HEART CATH, " "ANGIOPLASTY  08-18-16    LAD PCI. Stented with a 3.0 x 8 mm Xience Alpine stent.     STENT, CORONARY, S660 15/18  12/2015    VANITA=Diag, PTCA=LAD     STENT, CORONARY, S660 15/18  06/2015    VANITA=LAD       FAMILY HISTORY:  Family History   Problem Relation Age of Onset     HEART DISEASE Brother 40     CABG     KIDNEY DISEASE Sister      Hypertension Sister      HEART DISEASE Brother      Dilated aorta       SOCIAL HISTORY:  Social History     Social History     Marital status:      Spouse name: N/A     Number of children: N/A     Years of education: N/A     Social History Main Topics     Smoking status: Former Smoker     Types: Cigarettes     Smokeless tobacco: None     Alcohol use No     Drug use: No     Sexual activity: Not Asked     Other Topics Concern     Parent/Sibling W/ Cabg, Mi Or Angioplasty Before 65f 55m? Yes     Caffeine Concern Yes     2 cups daily     Sleep Concern Yes     Special Diet No      more proteins     Exercise Yes     Cardiac rehab      Social History Narrative       Review of Systems:  Skin:  Negative       Eyes:  Positive for glasses    ENT:  Negative      Respiratory:  Positive for dyspnea on exertion occas. dyspnea with exertion    Cardiovascular:    Positive for;lightheadedness occas. lightheadedness  with standing   Gastroenterology: Positive for heartburn occas heartburn   Genitourinary:  Positive for retention;urgency;hesitancy;decreased urinary stream Dialysis - 3 days per week   Musculoskeletal:  Positive for foot pain;gout;back pain lower back pain - sharp lately   Neurologic:  Positive for numbness or tingling of feet;headaches toes ,  occas headaches - after dialysis   Psychiatric:  Positive for sleep disturbances    Heme/Lymph/Imm:  Positive for allergies RX,   Endocrine:  Positive for diabetes      Physical Exam:  Vitals: /74 (BP Location: Right arm, Patient Position: Chair, Cuff Size: Adult Regular)  Pulse 72  Ht 1.803 m (5' 11\")  Wt 86.6 kg (191 lb)  BMI 26.64 " kg/m2    Constitutional:           Skin:           Head:           Eyes:           ENT:           Neck:           Chest:             Cardiac:                    Abdomen:           Vascular:                                          Extremities and Back:                 Neurological:                 CC  Arnoldo Diaz MD   [facility]  [id number]  [place] [id number]

## 2017-05-15 NOTE — TELEPHONE ENCOUNTER
Phone call to patient to tell him that Dr. Diaz called me this morning shortly after his visit with him in clinic and realized that his last stenting was in August of 2016 and NOT May of last year. Therefore, he is NOT to hold Plavix for his colonoscopy later this month, but to remain on Plavix indefinitely. I stated this a  twice to patient to ensure understanding and he verbalized this to me that he did understand. Abena.

## 2017-05-15 NOTE — PROGRESS NOTES
REASON FOR CLINIC VISIT:  Followup CAD.      HISTORY OF PRESENT ILLNESS:  Mr. Raza is a very pleasant 67-year-old gentleman with history of CAD with history of mid-LAD drug-eluting stent PCI in 06/2015.  At that time, he had an inferior branch diagonal with obstructive disease which was medically managed and subsequently had another non-STEMI in 12/2015, and was found to have tight ostial diagonal stenosis that was treated with a drug-eluting stent PCI and LAD was dilated with a balloon in kissing fashion.  Subsequently, in 05/2016, he had other non-STEMI and was found to have in-stent restenosis of the diagonal and he underwent cutting balloon angioplasty for the same.  Then, he had a non-STEMI back in 08/2016, and was found to have diagonal in-stent restenosis, dilated and then stented with 3 x 8 Xience Alpine stent.  He then was admitted several times since then, the first in early January with non-STEMI and repeat coronary angiogram showed patent stents.  He then was admitted in late January with nausea, vomiting, diarrhea and in March with fever and influenza and then last month because of weakness and was found to have bradycardia and carvedilol was stopped.  He was discharged on an event monitor which he is still on and there was some CardioNet transmission from 04/30 that showed sinus rhythm with isolated PVCs.  Today, he is coming in routine followup.  Overall, he tells me he is doing quite well.  He is using amlodipine on a p.r.n. basis for blood pressure control.  In the past, he had hypotension on the days of dialysis.  He denies any chest discomfort or shortness of breath.  Remarkably, in January this year, he had a lipid panel that showed significantly elevated triglycerides at 297.  He is on aspirin and Plavix.  He is also on amlodipine 2.5 mg daily and uses 5 mg as needed when blood pressures greater than 150 systolic.  He also takes hydralazine, insulin, Imdur, Lipitor.  LDL last year was 31.   Echocardiogram done last month showed normal LV function with mild mitral regurgitation.      PHYSICAL EXAMINATION:   VITAL SIGNS:  Blood pressure 150/74, heart rate 70 and regular, weight 191 pounds, BMI 26.69.   GENERAL:  The patient appears pleasant, comfortable.   NECK:  Normal JVP, no bruit.   CARDIOVASCULAR SYSTEM:  S1, S2 normal, no murmur, rub or gallop.   RESPIRATORY SYSTEM:  Clear to auscultation bilaterally.   ABDOMEN:  Soft, nontender.   EXTREMITIES:  No pitting pedal edema.   NEUROLOGICAL:  Alert and oriented x3.   PSYCH:  Normal affect.   SKIN:  No obvious rash.   HEENT:  No pallor or icterus.      IMPRESSION AND PLAN:  A very delightful 67-year-old gentleman with history of CAD with several non-STEMIs with last one in 08/2016 when he underwent diagonal in-stent restenosis with drug eluting PCI with Xience Alpine stent.  He has normal LV function.  He has other comorbidities of hypertension, end-stage renal dialysis on dialysis for the last 3 years, hypertension, diabetes, history of bradycardia on carvedilol and history of intermittent hypertension with labile blood pressure.  Overall, cardiovascular status wise he is doing reasonably well.  He does not appear to have any anginal symptoms.  His blood pressure is reasonably controlled at this time, I am not recommending any more aggressive blood pressure control as this usually leads to hypotension with adverse outcomes on the days of dialysis.  For now, he is using a combination of scheduled antihypertensive medication and as needed with reasonably well controlled low blood pressure.  His triglycerides were quite high and I recommend checking a fasting lipid panel.  I am recommending follow up in 1 month's time with an RENETTA to make sure that cardiovascular status wise he is stable.  I would recommend continuing dual antiplatelet therapy at least until 08/2017 and even beyond given his aggressive nature of coronary artery disease.         CHAD BURNETT,  MD             D: 05/15/2017 09:44   T: 05/15/2017 15:16   MT: darian      Name:     GREGORIO BRUSH   MRN:      [id number]        Account:      [id number]   :      1950           Service Date: 05/15/2017      Document: [id number]

## 2017-05-16 ENCOUNTER — OFFICE VISIT (OUTPATIENT)
Dept: TRANSPLANT | Facility: CLINIC | Age: 67
End: 2017-05-16
Attending: SURGERY
Payer: COMMERCIAL

## 2017-05-16 ENCOUNTER — DOCUMENTATION ONLY (OUTPATIENT)
Dept: CARDIOLOGY | Facility: CLINIC | Age: 67
End: 2017-05-16

## 2017-05-16 VITALS
BODY MASS INDEX: 26.74 KG/M2 | SYSTOLIC BLOOD PRESSURE: 192 MMHG | HEIGHT: 71 IN | WEIGHT: 191 LBS | RESPIRATION RATE: 16 BRPM | DIASTOLIC BLOOD PRESSURE: 78 MMHG | TEMPERATURE: 97.8 F | HEART RATE: 86 BPM | OXYGEN SATURATION: 98 %

## 2017-05-16 DIAGNOSIS — E11.01 TYPE 2 DIABETES MELLITUS WITH HYPEROSMOLAR COMA, UNSPECIFIED LONG TERM INSULIN USE STATUS: ICD-10-CM

## 2017-05-16 DIAGNOSIS — N18.6 END STAGE RENAL DISEASE (H): Primary | ICD-10-CM

## 2017-05-16 DIAGNOSIS — I25.10 CORONARY ARTERY DISEASE, ANGINA PRESENCE UNSPECIFIED, UNSPECIFIED VESSEL OR LESION TYPE, UNSPECIFIED WHETHER NATIVE OR TRANSPLANTED HEART: ICD-10-CM

## 2017-05-16 NOTE — PROGRESS NOTES
"Transplant Surgery Progress Note    Medical record number: [id number]  YOB: 1950,   Date of Visit:  2017    S: Mr. Brush is here to discuss is candidacy for kidney transplant.  He is a very complex patient with significant underlying CAD.  He takes nitro for chest pain on occasion (during dialysis is most common), and is on plavix.  He is in need of colonoscopy and prostate biopsy to move forward with activation on the transplant list, however according to his cardiologist he is not able to hold plavix even for a short time. He is currently wearing a holter monitor due to recent heart issue (bradycardia) associated with fluid overload.  He is diabetic and has controlled HIV.  He has multiple cardiac stents.  He is not risk adverse but is pretty-happy-go-guillermo about all his health problems. He reports he makes his own decisions and that his family supports him in this. He relates that dialysis has really decreased his quality of life. He has some bleeding problems from his fistula, has had fistuloplasties and hopes to transition to PD when he can no longer use his AVF.      Transplant Coordinator: Colleen Howell     Transplant Office Phone Number: [phone]     O: Vitals: /78  Pulse 86  Temp 97.8  F (36.6  C) (Oral)  Resp 16  Ht 1.803 m (5' 11\")  Wt 86.6 kg (191 lb)  SpO2 98%  BMI 26.64 kg/m2  BMI= Body mass index is 26.64 kg/(m^2).  Focussed exam:    Abdomen: Rounded  Femoral pulses 1+ bilaterally.       [facility]  Echocardiography Laboratory  [address] [place] [address]        Name: GREGORIO BRUSH  MRN: [id number]  : 1950  Study Date: 2017 09:26 AM  Age: 67 yrs  Gender: Male  Patient Location: [facility]  Reason For Study: Bradycardia - Sinus  Ordering Physician: DRISS GONZALEZ  Referring Physician: Aida Thomas  Performed By: Christian Dozier ERIK     BSA: 2.1 m2  Height: 71 in  Weight: 198 lb  HR: 63  BP: 139/74 " mmHg    Interpretation Summary     The visual ejection fraction is estimated at 55-60%.  The right ventricle is mildly dilated.  The left atrium is mildly dilated.  There is mild (1+) mitral regurgitation.  Right ventricular systolic pressure is elevated, consistent with moderate pulmonary hypertension. Higher compared with previous study.  There is trace to mild aortic regurgitation.    Left Ventricle  The left ventricle is normal in size. There is normal left ventricular wall  thickness. The visual ejection fraction is estimated at 55-60%. The  transmitral spectral Doppler flow pattern is suggestive of  pseudonormalization.     Right Ventricle  The right ventricle is mildly dilated. The right ventricular systolic function  is normal.     Atria  The left atrium is mildly dilated. Right atrial size is normal.     Mitral Valve  There is mild mitral annular calcification. The mitral valve leaflets are  mildly thickened. There is mild (1+) mitral regurgitation.     Tricuspid Valve  Normal tricuspid valve. Right ventricular systolic pressure is elevated,  consistent with moderate pulmonary hypertension. There is trace to mild  tricuspid regurgitation.     Aortic Valve  There is mild trileaflet aortic sclerosis. There is trace to mild aortic  regurgitation.     Pulmonic Valve  Normal pulmonic valve. There is trace to mild pulmonic valvular regurgitation.     Vessels  The aortic root is normal size. The inferior vena cava is not dilated.     Pericardium  There is no pericardial effusion.        Rhythm  The rhythm was normal sinus.  ______________________________      Doppler Measurements & Calculations  MV A max danny: 63.7 cm/sec  MV dec time: 0.20 sec  TR max danny: 339.0 cm/sec  TR max P.0 mmHg      Report approved by: Digna Bishop 2017 11:02 AM    16  5:11 PM [id number] [facility] Cardiac Cath Lab        Narrative        1. Coronary angiogram    2. Percutaneous coronary intervention: Cutting Balloon  angioplasty of  in-stent restenosis in the first diagonal branch of LAD in (2.25 mm  device)    Approach: Right femoral artery    Complications: None noted    Indication: Mr. Raza has a history of multiple previous  percutaneous coronary interventions including stent implantation in  the mid and distal LAD and the first diagonal branch of the LAD. He  has end-stage renal disease on chronic hemodialysis and percent  inferior with chest pain and a small troponin rise compatible with  non-ST segment elevation myocardial infarction. A nuclear stress test  was nondiagnostic. His referring cardiologist Dr. Webb requested  diagnostic angiography followed by possible mechanical  revascularization. In view of of the patient's need for chronic  hemodialysis and are desire to avoid any intervention that might  result in inability to use the radial arteries for a dialysis shunt,  we elected to use a femoral approach. We have reviewed the risks of  death, myocardial infarction, stroke, hematoma, need for dual  antiplatelet therapy should a stent be placed, and the option of  medical therapy alone. The risk of peripheral vascular complications  was also discussed. The patient voiced understanding and wishes to  proceed    1) coronary angiogram    The right femoral region was prepped and draped in standard fashion  and infiltrated with 1% lidocaine. The right femoral head was  localized under fluoroscopy. The right femoral artery was entered with  a single and to wall percutaneous stick and a 4 Thai sheath was  placed in the right femoral artery. We advanced a Kandis 4.5 JL4  catheter and placed this catheter in the ascending aorta. The catheter  was seated in the ostium of left main. Left coronary angiography was  performed in orthogonal views. We then exchanged for a 3 DRC catheter  and placed this catheter in the ostium of the right coronary. Right  coronary angiography was performed in orthogonal views. We noted  there  was in-stent restenosis with a subtotal narrowing in the stented  segment of the ostial first diagonal branch. I I elected to proceed to  intervention.    2) percutaneous coronary intervention in-stent restenosis in the  ostial portion of the first diagonal branch of LAD    We exchanged for a EBU 3.75 guide.. We gave a bivalirudin bolus and  drip. The patient was given an extra 300 mg of clopidogrel has been on  clopidogrel long-term. We advanced a O14 run-through wire under  fluoroscopic guidance into and past  The ostial lesion in the first  diagonal branch. I then performed a series of dilatations with a 2.25  Cutting Balloon up to 8 atmospheres. We then removed the guidewire  took final views in the CHINCH[place]A and [place] projections confirming a very  result with no residual narrowing. There was good brisk STEVE 3 flow  down the vessel    We stopped at this point the catheter was removed from the sheath. The  sheath was affixed to the leg. The patient was returned back to the  ICU for observation. No complications were observed    Findings    Preintervention    Left main: No significant narrowing LAD: The proximal mid and distal  LAD contained no significant narrowing and both stented segments in  the mid and distal LAD are widely patent. The first diagonal branch  has a subtotal in-stent restenosis at the ostium. The first diagonal  branch bifurcates into superior and inferior rami, the largest ramus  being the superior ramus.    Circumflex: A nondominant vessel. No significant narrowing.     Right coronary: A dominant vessel. It has  atheromatous change with no  significant narrowing.    Post intervention    LAD: There is no residual narrowing within the previous site of  in-stent restenosis in the ostial segment of the first diagonal  branch. There is distal STEVE 3 flow. There is no change in the  appearance of the LAD    Assessment successful intervention for management of non-ST segment  myocardial infarction  secondary to in-stent restenosis in the first  diagonal branch of LAD.    Recommendations  1) continue long-term dual antiplatelet therapy that the bleeding  complications   2) high potency statin therapy  3) ideal blood pressure control  4) hemodialysis          TELLO BARR MD       A/P:   1. Kidney transplant candidacy: While no one problem is a clear contraindication, my concern is that he will be at significant risk for biopsy of a concerning prostate lesion due to need for ongoing plavix.  Biopsy will still be associated with a 25% risk of false negative biopsy per urology.  If it wasn't for transplant candidacy it is my understanding that he would be followed non-invasively.  Overall comorbidities likely make kidney transplant risk equal to remaining on dialysis, and may shorten his life expectancy due to complications either of surgery or immunosuppression.  These concerns were discussed with the patient in detail.  Final decision regarding candidacy will be discussed in the Multidisciplinary Selection Committee.    Total time: 25 minutes  Counselling time: 20 minutes            Hal Rodriguez MD  Department of Surgery

## 2017-05-16 NOTE — LETTER
"2017      RE: Gregorio Brush  [address]  [address]  [facility] [address]       Transplant Surgery Progress Note    Medical record number: [id number]  YOB: 1950,   Date of Visit:  2017    S: Mr. Brush is here to discuss is candidacy for kidney transplant.  He is a very complex patient with significant underlying CAD.  He takes nitro for chest pain on occasion (during dialysis is most common), and is on plavix.  He is in need of colonoscopy and prostate biopsy to move forward with activation on the transplant list, however according to his cardiologist he is not able to hold plavix even for a short time. He is currently wearing a holter monitor due to recent heart issue (bradycardia) associated with fluid overload.  He is diabetic and has controlled HIV.  He has multiple cardiac stents.  He is not risk adverse but is pretty-happy-go-guillermo about all his health problems. He reports he makes his own decisions and that his family supports him in this. He relates that dialysis has really decreased his quality of life. He has some bleeding problems from his fistula, has had fistuloplasties and hopes to transition to PD when he can no longer use his AVF.      Transplant Coordinator: Colleen Howell     Transplant Office Phone Number: [phone]     O: Vitals: /78  Pulse 86  Temp 97.8  F (36.6  C) (Oral)  Resp 16  Ht 1.803 m (5' 11\")  Wt 86.6 kg (191 lb)  SpO2 98%  BMI 26.64 kg/m2  BMI= Body mass index is 26.64 kg/(m^2).  Focussed exam:    Abdomen: Rounded  Femoral pulses 1+ bilaterally.       [facility]  Echocardiography Laboratory  [address] [place] [address]        Name: GREGORIO BRUSH  MRN: [id number]  : 1950  Study Date: 2017 09:26 AM  Age: 67 yrs  Gender: Male  Patient Location: [facility]  Reason For Study: Bradycardia - Sinus  Ordering Physician: DRISS GONZALEZ  Referring Physician: Aida Thomas  Performed By: Christian" GORAN Dozier     BSA: 2.1 m2  Height: 71 in  Weight: 198 lb  HR: 63  BP: 139/74 mmHg    Interpretation Summary     The visual ejection fraction is estimated at 55-60%.  The right ventricle is mildly dilated.  The left atrium is mildly dilated.  There is mild (1+) mitral regurgitation.  Right ventricular systolic pressure is elevated, consistent with moderate pulmonary hypertension. Higher compared with previous study.  There is trace to mild aortic regurgitation.    Left Ventricle  The left ventricle is normal in size. There is normal left ventricular wall  thickness. The visual ejection fraction is estimated at 55-60%. The  transmitral spectral Doppler flow pattern is suggestive of  pseudonormalization.     Right Ventricle  The right ventricle is mildly dilated. The right ventricular systolic function  is normal.     Atria  The left atrium is mildly dilated. Right atrial size is normal.     Mitral Valve  There is mild mitral annular calcification. The mitral valve leaflets are  mildly thickened. There is mild (1+) mitral regurgitation.     Tricuspid Valve  Normal tricuspid valve. Right ventricular systolic pressure is elevated,  consistent with moderate pulmonary hypertension. There is trace to mild  tricuspid regurgitation.     Aortic Valve  There is mild trileaflet aortic sclerosis. There is trace to mild aortic  regurgitation.     Pulmonic Valve  Normal pulmonic valve. There is trace to mild pulmonic valvular regurgitation.     Vessels  The aortic root is normal size. The inferior vena cava is not dilated.     Pericardium  There is no pericardial effusion.        Rhythm  The rhythm was normal sinus.  ______________________________      Doppler Measurements & Calculations  MV A max danny: 63.7 cm/sec  MV dec time: 0.20 sec  TR max danny: 339.0 cm/sec  TR max P.0 mmHg      Report approved by: Digna Bishop 2017 11:02 AM    16  5:11 PM [id number] [facility] Cardiac Cath Lab        Narrative         1. Coronary angiogram    2. Percutaneous coronary intervention: Cutting Balloon angioplasty of  in-stent restenosis in the first diagonal branch of LAD in (2.25 mm  device)    Approach: Right femoral artery    Complications: None noted    Indication: Mr. Raza has a history of multiple previous  percutaneous coronary interventions including stent implantation in  the mid and distal LAD and the first diagonal branch of the LAD. He  has end-stage renal disease on chronic hemodialysis and percent  inferior with chest pain and a small troponin rise compatible with  non-ST segment elevation myocardial infarction. A nuclear stress test  was nondiagnostic. His referring cardiologist Dr. Webb requested  diagnostic angiography followed by possible mechanical  revascularization. In view of of the patient's need for chronic  hemodialysis and are desire to avoid any intervention that might  result in inability to use the radial arteries for a dialysis shunt,  we elected to use a femoral approach. We have reviewed the risks of  death, myocardial infarction, stroke, hematoma, need for dual  antiplatelet therapy should a stent be placed, and the option of  medical therapy alone. The risk of peripheral vascular complications  was also discussed. The patient voiced understanding and wishes to  proceed    1) coronary angiogram    The right femoral region was prepped and draped in standard fashion  and infiltrated with 1% lidocaine. The right femoral head was  localized under fluoroscopy. The right femoral artery was entered with  a single and to wall percutaneous stick and a 4 Belarusian sheath was  placed in the right femoral artery. We advanced a Kandis 4.5 JL4  catheter and placed this catheter in the ascending aorta. The catheter  was seated in the ostium of left main. Left coronary angiography was  performed in orthogonal views. We then exchanged for a 3 DRC catheter  and placed this catheter in the ostium of the right  coronary. Right  coronary angiography was performed in orthogonal views. We noted there  was in-stent restenosis with a subtotal narrowing in the stented  segment of the ostial first diagonal branch. I I elected to proceed to  intervention.    2) percutaneous coronary intervention in-stent restenosis in the  ostial portion of the first diagonal branch of LAD    We exchanged for a EBU 3.75 guide.. We gave a bivalirudin bolus and  drip. The patient was given an extra 300 mg of clopidogrel has been on  clopidogrel long-term. We advanced a O14 run-through wire under  fluoroscopic guidance into and past  The ostial lesion in the first  diagonal branch. I then performed a series of dilatations with a 2.25  Cutting Balloon up to 8 atmospheres. We then removed the guidewire  took final views in the CHINCHILLA and SANDY projections confirming a very  result with no residual narrowing. There was good brisk STEVE 3 flow  down the vessel    We stopped at this point the catheter was removed from the sheath. The  sheath was affixed to the leg. The patient was returned back to the  ICU for observation. No complications were observed    Findings    Preintervention    Left main: No significant narrowing LAD: The proximal mid and distal  LAD contained no significant narrowing and both stented segments in  the mid and distal LAD are widely patent. The first diagonal branch  has a subtotal in-stent restenosis at the ostium. The first diagonal  branch bifurcates into superior and inferior rami, the largest ramus  being the superior ramus.    Circumflex: A nondominant vessel. No significant narrowing.     Right coronary: A dominant vessel. It has  atheromatous change with no  significant narrowing.    Post intervention    LAD: There is no residual narrowing within the previous site of  in-stent restenosis in the ostial segment of the first diagonal  branch. There is distal STEVE 3 flow. There is no change in the  appearance of the LAD    Assessment  successful intervention for management of non-ST segment  myocardial infarction secondary to in-stent restenosis in the first  diagonal branch of LAD.    Recommendations  1) continue long-term dual antiplatelet therapy that the bleeding  complications   2) high potency statin therapy  3) ideal blood pressure control  4) hemodialysis          TELLO BARR MD       A/P:   1. Kidney transplant candidacy: While no one problem is a clear contraindication, my concern is that he will be at significant risk for biopsy of a concerning prostate lesion due to need for ongoing plavix.  Biopsy will still be associated with a 25% risk of false negative biopsy per urology.  If it wasn't for transplant candidacy it is my understanding that he would be followed non-invasively.  Overall comorbidities likely make kidney transplant risk equal to remaining on dialysis, and may shorten his life expectancy due to complications either of surgery or immunosuppression.  These concerns were discussed with the patient in detail.  Final decision regarding candidacy will be discussed in the Multidisciplinary Selection Committee.    Total time: 25 minutes  Counselling time: 20 minutes            Hal Rodriguez MD  Department of Surgery

## 2017-05-16 NOTE — PROGRESS NOTES
Cardionet transmission from 5/15 showing SR with isolated PVC-Symptomatic event. Dr. Diaz saw patient yesterday ands reviewed all monitoring strips to date. Patient returns to see Yuki Hinojosa on 6/7. Monitor comes off on 5/18. Abena

## 2017-05-16 NOTE — MR AVS SNAPSHOT
After Visit Summary   5/16/2017    Donald Raza    MRN: [id number]           Patient Information     Date Of Birth          1950        Visit Information        Provider Department      5/16/2017 8:40 AM Hal Rodriguez MD [facility] Solid Organ Transplant        Today's Diagnoses     End stage renal disease (H)    -  1    Type 2 diabetes mellitus with hyperosmolar coma, unspecified long term insulin use status (H)        Coronary artery disease, angina presence unspecified, unspecified vessel or lesion type, unspecified whether native or transplanted heart           Follow-ups after your visit        Your next 10 appointments already scheduled     Jun 05, 2017 10:00 AM CDT   LAB with RU LAB   [facility] ([facility])    [id number] [place] [address] [address]  [facility] [id number] [phone]           Patient must bring picture ID.  Patient should be prepared to give a urine specimen  Please do not eat 10-12 hours before your appointment if you are coming in fasting for labs on lipids, cholesterol, or glucose (sugar).  Pregnant women should follow their Care Team instructions. Water with medications is okay. Do not drink coffee or other fluids.   If you have concerns about taking  your medications, please ask at office or if scheduling via Parchment, send a message by clicking on Secure Messaging, Message Your Care Team.            Jun 07, 2017  2:30 PM CDT   Return Visit with DEDRICK Glaser CNP   [facility] ([facility])    [address] [place] [address]  [facility] [phone] [phone]            Aug 09, 2017  8:00 AM CDT   Ech Complete with [id number] [facility] [place] ([facility])    [address] [place] [address]  [facility] [id number] [phone]           1.  Please bring or wear a comfortable two-piece outfit. 2.  You may eat, drink and take  your normal medicines. 3.  For any questions that cannot be answered, please contact the ordering physician ***Please check-in at the [place] Registration Office located in [address] in the [facility] building. When you are finished registering, please go to [address] and have a seat. The technician will call your name for the test.            Aug 09, 2017  9:00 AM CDT   LAB with RU LAB   [facility] ([facility])    [address]  [facility] [address] [phone]           Patient must bring picture ID.  Patient should be prepared to give a urine specimen  Please do not eat 10-12 hours before your appointment if you are coming in fasting for labs on lipids, cholesterol, or glucose (sugar).  Pregnant women should follow their Care Team instructions. Water with medications is okay. Do not drink coffee or other fluids.   If you have concerns about taking  your medications, please ask at office or if scheduling via Cocodot, send a message by clicking on Secure Messaging, Message Your Care Team.            Aug 10, 2017  3:15 PM CDT   Return Visit with Arnoldo Diaz MD   [facility] ([facility])    [address]  [facility] [address] [phone]              Who to contact     If you have questions or need follow up information about today's clinic visit or your schedule please contact [facility] directly at [phone].  Normal or non-critical lab and imaging results will be communicated to you by MyChart, letter or phone within 4 business days after the clinic has received the results. If you do not hear from us within 7 days, please contact the clinic through MyChart or phone. If you have a critical or abnormal lab result, we will notify you by phone as soon as possible.  Submit refill requests through Bringmet or call your pharmacy and they will  "forward the refill request to us. Please allow 3 business days for your refill to be completed.          Additional Information About Your Visit        MyChart Information     Arthena lets you send messages to your doctor, view your test results, renew your prescriptions, schedule appointments and more. To sign up, go to [email or web address] . Click on \"Log in\" on the left side of the screen, which will take you to the Welcome page. Then click on \"Sign up Now\" on the right side of the page.     You will be asked to enter the access code listed below, as well as some personal information. Please follow the directions to create your username and password.     Your access code is: [email or web address]  Expires: 2017 10:31 AM     Your access code will  in 90 days. If you need help or a new code, please call your [place] clinic or [phone].        Care EveryWhere ID     This is your Care EveryWhere ID. This could be used by other organizations to access your [place] medical records  [id number]        Your Vitals Were     Pulse Temperature Respirations Height Pulse Oximetry BMI (Body Mass Index)    86 97.8  F (36.6  C) (Oral) 16 1.803 m (5' 11\") 98% 26.64 kg/m2       Blood Pressure from Last 3 Encounters:   17 192/78   05/15/17 150/74   17 153/79    Weight from Last 3 Encounters:   17 86.6 kg (191 lb)   05/15/17 86.6 kg (191 lb)   17 89.7 kg (197 lb 12 oz)              Today, you had the following     No orders found for display       Primary Care Provider Office Phone # Fax #    Adia Thomas [phone]       [facility] [address] [place] [address]        Thank you!     Thank you for choosing [facility]  for your care. Our goal is always to provide you with excellent care. Hearing back from our patients is one way we can continue to improve our services. Please take a few minutes to complete the written survey that you " may receive in the mail after your visit with us. Thank you!             Your Updated Medication List - Protect others around you: Learn how to safely use, store and throw away your medicines at www.disposemymeds.org.          This list is accurate as of: 5/16/17 11:59 PM.  Always use your most recent med list.                   Brand Name Dispense Instructions for use    abacavir 300 MG tablet    ZIAGEN     Take 600 mg by mouth every evening       albuterol 108 (90 BASE) MCG/ACT Inhaler    PROAIR HFA/PROVENTIL HFA/VENTOLIN HFA    1 Inhaler    Inhale 2 puffs into the lungs every 6 hours as needed for shortness of breath / dyspnea or wheezing       * amLODIPine 5 MG tablet    NORVASC     Take 5 mg by mouth daily as needed (only uses when blood pressure greater than 150-160)       * amLODIPine 2.5 MG tablet    NORVASC    30 tablet    Take 1 tablet (2.5 mg) by mouth daily       ANUCORT-HC 25 MG Suppository   Generic drug:  hydrocortisone      Place 25 mg rectally 2 times daily as needed       atorvastatin 40 MG tablet    LIPITOR     Take 40 mg by mouth At Bedtime       B complex-C-folic acid 1 MG capsule      Take 1 capsule by mouth every evening       calcium acetate 667 MG Caps capsule    PHOSLO     2,001 mg 3 times daily (with meals) Take 3 capsules three times a day with meals       chlorhexidine 0.12 % solution    PERIDEX     Rinse and gargle 15 ml by mouth twice a day as directed.       CLONAZEPAM PO      Take 0.5 mg by mouth nightly as needed for anxiety (restless legs)       CLOPIDOGREL BISULFATE PO      Take 75 mg by mouth every evening       dapsone 100 MG tablet      Take 100 mg by mouth At Bedtime       DIALYVITE 800/ULTRA D Tabs      Take 1 tablet by mouth daily       dolutegravir 50 MG tablet    TIVICAY     Take 50 mg by mouth At Bedtime       DOXERCALCIFEROL IV      Inject 6 mcg into the vein three times a week (with dialysis)       epoetin brittni 13134 UNIT/ML injection    EPOGEN/PROCRIT     Inject 11,000  "Units Subcutaneous three times a week WITH DIALYSIS       * gabapentin 300 MG capsule    NEURONTIN     Take 300 mg by mouth every morning       * gabapentin 300 MG capsule    NEURONTIN     Take 600 mg by mouth every evening       guaiFENesin-dextromethorphan 100-10 MG/5ML syrup    ROBITUSSIN DM    118 mL    Take 5 mLs by mouth every 4 hours as needed for cough       * HumaLOG KWIKpen 100 UNIT/ML injection   Generic drug:  insulin lispro      Inject Subcutaneous 3 times daily (before meals) Sliding scale - 2 units per 50 over 150       * insulin lispro 100 UNIT/ML injection    HumaLOG KWIKpen     Inject 5 Units Subcutaneous 3 times daily (before meals)       * HYDRALAZINE HCL PO      Take 25 mg by mouth daily       * HYDRALAZINE HCL PO      Take 25 mg by mouth 2 times daily as needed for high blood pressure       imiquimod 5 % cream    ALDARA     Apply topically as needed       insulin glargine 100 UNIT/ML injection    LANTUS     Inject 36 Units Subcutaneous every morning       iron sucrose 20 MG/ML injection    VENOFER     Inject 50 mg into the vein once a week WIth dialysis       isosorbide mononitrate 30 MG 24 hr tablet    IMDUR    30 tablet    Take 2 tablets (60 mg) by mouth every evening       lamISIL AT 1 % cream   Generic drug:  terbinafine      Apply topically 2 times daily as needed       * LOSARTAN POTASSIUM PO      Take 100 mg by mouth daily as needed (with breakfast)       * losartan 100 MG tablet    COZAAR    30 tablet    Take 1 tablet (100 mg) by mouth At Bedtime       MAGNESIUM OXIDE PO      Take 400 mg by mouth At Bedtime       mycophenolate 500 MG tablet    CELLCEPT - GENERIC EQUIVALENT     Take 500 mg by mouth 2 times daily On an empty stomach       NEPRO PO      1-3 times daily       nitroglycerin 0.4 MG sublingual tablet    NITROSTAT    25 tablet    One tablet under the tongue every 5 minutes if needed for chest pain. May repeat every 5 minutes for a maximum of 3 doses in 15 minutes\"       " nystatin cream    MYCOSTATIN     Apply topically daily as needed for dry skin       order for DME     1 each    Equipment being ordered: Other: 4WW Treatment Diagnosis: Decreased activity tolerance       PREZISTA PO      Take 800 mg by mouth At Bedtime.       priLOSEC 40 MG capsule   Generic drug:  omeprazole      Take 40 mg by mouth daily 1 hour before dinner       ritonavir 100 MG capsule    NORVIR     Take 1 capsule by mouth At Bedtime       * Notice:  This list has 10 medication(s) that are the same as other medications prescribed for you. Read the directions carefully, and ask your doctor or other care provider to review them with you.

## 2017-05-17 ENCOUNTER — TELEPHONE (OUTPATIENT)
Dept: TRANSPLANT | Facility: CLINIC | Age: 67
End: 2017-05-17

## 2017-05-17 NOTE — TELEPHONE ENCOUNTER
Call returned to Donald, he is wondering what the next step will be. I will get in contact with Dr. Rodriguez and let him know when I have an answer.

## 2017-05-25 NOTE — TELEPHONE ENCOUNTER
Call to Donald, left message that we will review his case next week as Dr. Rodriguez was unavailable yesterday.

## 2017-05-31 ENCOUNTER — TELEPHONE (OUTPATIENT)
Dept: TRANSPLANT | Facility: CLINIC | Age: 67
End: 2017-05-31

## 2017-05-31 ENCOUNTER — TEAM CONFERENCE (OUTPATIENT)
Dept: TRANSPLANT | Facility: CLINIC | Age: 67
End: 2017-05-31

## 2017-05-31 NOTE — TELEPHONE ENCOUNTER
TEAM CONF:     ATTENDEES: Hermniia Tucker Dunn, [place], Social work, Pharmacy, Dietary and Coordiantors.     Discussion and Outcome: Reviewed vascular situation, inability to come off anticoagulation, concern for prostate cancer. Determined to no longer be a candidate.     Call to Donald, understands he is no longer a transplant candidate and accepts this decision.

## 2017-05-31 NOTE — LETTER
May 31, 2017    Donald Raza  [address]  [address]  [facility] [address]        Dear Mr. Raza,    The purpose of this letter is to let you know the [facility] Multi-Disciplinary Selection Team made the decision to remove you from the kidney transplant list.  This is because transplant was felt to be too high risk for you.   Important things you should know:    If you would like to discuss the decision, or if your medical status changes, you may call [phone] and ask to speak to your transplant coordinator.    We recommend that you continue to follow up with your primary care and referring physicians in order to manage your health concerns.  Enclosed is a letter from [employer] which describes the services offered to patients by [facility] and the Organ Procurement [facility] Transplantation Network.  Thank you for allowing us to participate in your care.  We wish you well.    Sincerely,    Kidney Transplant Team  Enclosure:  [employer] Letter     CC:   Dialysis, Dr. Chavez and Lay Givens, LEAH; Dr Dominik Chavez

## 2017-06-05 DIAGNOSIS — I25.10 CORONARY ARTERY DISEASE INVOLVING NATIVE CORONARY ARTERY OF NATIVE HEART WITHOUT ANGINA PECTORIS: ICD-10-CM

## 2017-06-05 LAB
ALT SERPL W P-5'-P-CCNC: 21 U/L (ref 0–70)
CHOLEST SERPL-MCNC: 120 MG/DL
HDLC SERPL-MCNC: 33 MG/DL
LDLC SERPL CALC-MCNC: 12 MG/DL
NONHDLC SERPL-MCNC: 87 MG/DL
TRIGL SERPL-MCNC: 373 MG/DL

## 2017-06-05 PROCEDURE — 80061 LIPID PANEL: CPT | Performed by: INTERNAL MEDICINE

## 2017-06-05 PROCEDURE — 84460 ALANINE AMINO (ALT) (SGPT): CPT | Performed by: INTERNAL MEDICINE

## 2017-06-05 PROCEDURE — 36415 COLL VENOUS BLD VENIPUNCTURE: CPT | Performed by: INTERNAL MEDICINE

## 2017-06-06 ENCOUNTER — DOCUMENTATION ONLY (OUTPATIENT)
Dept: CARDIOLOGY | Facility: CLINIC | Age: 67
End: 2017-06-06

## 2017-06-06 NOTE — PROGRESS NOTES
Lipid results ready for review. LDL is 12, with Triglycerides at 373. On Lipitor 40 mg daily.   Will forward to Dr. Diaz for review and any recommendations.     Component      Latest Ref Rng & Units 6/5/2017   Cholesterol      <200 mg/dL 120   Triglycerides      <150 mg/dL 373 (H)   HDL Cholesterol      >39 mg/dL 33 (L)   LDL Cholesterol Calculated      <100 mg/dL 12   Non HDL Cholesterol      <130 mg/dL 87   ALT      0 - 70 U/L 21

## 2017-06-07 ENCOUNTER — OFFICE VISIT (OUTPATIENT)
Dept: CARDIOLOGY | Facility: CLINIC | Age: 67
End: 2017-06-07
Attending: INTERNAL MEDICINE
Payer: COMMERCIAL

## 2017-06-07 ENCOUNTER — TELEPHONE (OUTPATIENT)
Dept: CARDIOLOGY | Facility: CLINIC | Age: 67
End: 2017-06-07

## 2017-06-07 VITALS
DIASTOLIC BLOOD PRESSURE: 64 MMHG | WEIGHT: 199 LBS | BODY MASS INDEX: 27.86 KG/M2 | HEART RATE: 72 BPM | HEIGHT: 71 IN | SYSTOLIC BLOOD PRESSURE: 124 MMHG

## 2017-06-07 DIAGNOSIS — I25.10 CORONARY ARTERY DISEASE INVOLVING NATIVE CORONARY ARTERY OF NATIVE HEART WITHOUT ANGINA PECTORIS: ICD-10-CM

## 2017-06-07 LAB — TROPONIN I SERPL-MCNC: 0.02 UG/L (ref 0–0.04)

## 2017-06-07 PROCEDURE — 36415 COLL VENOUS BLD VENIPUNCTURE: CPT | Performed by: NURSE PRACTITIONER

## 2017-06-07 PROCEDURE — 84484 ASSAY OF TROPONIN QUANT: CPT | Performed by: NURSE PRACTITIONER

## 2017-06-07 PROCEDURE — 93000 ELECTROCARDIOGRAM COMPLETE: CPT | Performed by: NURSE PRACTITIONER

## 2017-06-07 PROCEDURE — 99215 OFFICE O/P EST HI 40 MIN: CPT | Mod: 25 | Performed by: NURSE PRACTITIONER

## 2017-06-07 NOTE — TELEPHONE ENCOUNTER
I called and spoke to Pura, patient's daughter. She will let patient know that his labs today were normal.  I went over dates and times of patient's upcoming appts and procedures. Pura had no questions.     Gil SILVERMAN  [facility]

## 2017-06-07 NOTE — PROGRESS NOTES
HPI and Plan: #[id number]  See dictation    Advised pt to seek emergency services if pain worsens or does not go away with nitroglycerin.  Pt verbalized understanding  Orders Placed This Encounter   Procedures     NM Lexiscan stress test (nuc card)     Troponin I     EKG 12-lead complete w/read (completed today)       No orders of the defined types were placed in this encounter.      Medications Discontinued During This Encounter   Medication Reason     HYDRALAZINE HCL PO Medication Reconciliation Clean Up         Encounter Diagnosis   Name Primary?     Coronary artery disease involving native coronary artery of native heart without angina pectoris        CURRENT MEDICATIONS:  Current Outpatient Prescriptions   Medication Sig Dispense Refill     insulin glargine (LANTUS) 100 UNIT/ML injection Inject 36 Units Subcutaneous every morning       amLODIPine (NORVASC) 2.5 MG tablet Take 1 tablet (2.5 mg) by mouth daily 30 tablet 0     amLODIPine (NORVASC) 5 MG tablet Take 5 mg by mouth daily as needed (only uses when blood pressure greater than 150-160)       HYDRALAZINE HCL PO Take 25 mg by mouth 2 times daily as needed for high blood pressure       LOSARTAN POTASSIUM PO Take 100 mg by mouth daily as needed (with breakfast)       albuterol (PROAIR HFA/PROVENTIL HFA/VENTOLIN HFA) 108 (90 BASE) MCG/ACT Inhaler Inhale 2 puffs into the lungs every 6 hours as needed for shortness of breath / dyspnea or wheezing 1 Inhaler 1     guaiFENesin-dextromethorphan (ROBITUSSIN DM) 100-10 MG/5ML syrup Take 5 mLs by mouth every 4 hours as needed for cough 118 mL 0     Nutritional Supplements (NEPRO PO) 1-3 times daily       omeprazole (PRILOSEC) 40 MG capsule Take 40 mg by mouth daily 1 hour before dinner       B complex-C-folic acid (NEPHROCAPS/TRIPHROCAPS) 1 MG capsule Take 1 capsule by mouth every evening       insulin lispro (HUMALOG KWIKPEN) 100 UNIT/ML injection Inject 5 Units Subcutaneous 3 times daily (before meals)       order for  DME Equipment being ordered: Other: 4WW  Treatment Diagnosis: Decreased activity tolerance 1 each 0     insulin lispro (HUMALOG KWIKPEN) 100 UNIT/ML injection Inject Subcutaneous 3 times daily (before meals) Sliding scale - 2 units per 50 over 150       Multiple Vitamins-Minerals (DIALYVITE 800/ULTRA D) TABS Take 1 tablet by mouth daily       mycophenolate (CELLCEPT - GENERIC EQUIVALENT) 500 MG tablet Take 500 mg by mouth 2 times daily On an empty stomach       isosorbide mononitrate (IMDUR) 30 MG 24 hr tablet Take 2 tablets (60 mg) by mouth every evening 30 tablet 0     losartan (COZAAR) 100 MG tablet Take 1 tablet (100 mg) by mouth At Bedtime 30 tablet 0     gabapentin (NEURONTIN) 300 MG capsule Take 600 mg by mouth every evening       nystatin (MYCOSTATIN) cream Apply topically daily as needed for dry skin       imiquimod (ALDARA) 5 % cream Apply topically as needed       DOXERCALCIFEROL IV Inject 6 mcg into the vein three times a week (with dialysis)       CLONAZEPAM PO Take 0.5 mg by mouth nightly as needed for anxiety (restless legs)       CLOPIDOGREL BISULFATE PO Take 75 mg by mouth every evening        abacavir (ZIAGEN) 300 MG tablet Take 600 mg by mouth every evening        calcium acetate (PHOSLO) 667 MG CAPS 2,001 mg 3 times daily (with meals) Take 3 capsules three times a day with meals       chlorhexidine (CHLORHEXIDINE) 0.12 % solution Rinse and gargle 15 ml by mouth twice a day as directed.       hydrocortisone (ANUCORT-HC) 25 MG suppository Place 25 mg rectally 2 times daily as needed        terbinafine (LAMISIL AT) 1 % cream Apply topically 2 times daily as needed        atorvastatin (LIPITOR) 40 MG tablet Take 40 mg by mouth At Bedtime       epoetin brittni (EPOGEN,PROCRIT) 46991 UNIT/ML injection Inject 11,000 Units Subcutaneous three times a week WITH DIALYSIS       iron sucrose (VENOFER) 20 MG/ML injection Inject 50 mg into the vein once a week WIth dialysis       MAGNESIUM OXIDE PO Take 400 mg by  "mouth At Bedtime       dolutegravir (TIVICAY) 50 MG tablet Take 50 mg by mouth At Bedtime       nitroglycerin (NITROSTAT) 0.4 MG SL tablet One tablet under the tongue every 5 minutes if needed for chest pain. May repeat every 5 minutes for a maximum of 3 doses in 15 minutes\" 25 tablet 3     gabapentin (NEURONTIN) 300 MG capsule Take 300 mg by mouth every morning        dapsone 100 MG tablet Take 100 mg by mouth At Bedtime        Darunavir Ethanolate (PREZISTA PO) Take 800 mg by mouth At Bedtime.       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, recurrent     Bradycardia 5/28/2016     CAD S/P percutaneous coronary angioplasty 6/15/2015     Chest pain      CKD (chronic kidney disease)     Hemodialysis     Coronary artery disease     stents     Dilated aortic root (H) 5/6/2016     ESRD (end stage renal disease) on dialysis (H) 5/6/2016     Human immunodeficiency virus (HIV) disease (H)      Hypertension 2010     Hypotension, unspecified hypotension type 5/22/2016     Impotence of organic origin      Increased prostate specific antigen (PSA) velocity 08/08/2016    Awaiting bx on blood thinner     Mixed hyperlipidemia      Near syncope 2016    with hemodialysis     NSTEMI (non-ST elevated myocardial infarction) (H) 12/2015, 5/2016     Pulmonary HTN (H)     Mod     TIA (transient ischemic attack) 5/2016     Type 2 diabetes mellitus (H) age 50     Unstable angina (H) 3/4/2016       PAST SURGICAL HISTORY:  Past Surgical History:   Procedure Laterality Date     ANGIOGRAM  03-04-16    No culprit lesions, stents widely patent      ANGIOGRAM  05-06-16    " Cutting balloon ptca=Diag     APPENDECTOMY  2000     CHOLECYSTECTOMY, LAPOROSCOPIC       COLOSTOMY  09/30/1999    Temporary for diverticulitis     HEART CATH, ANGIOPLASTY  08-18-16    LAD PCI. Stented with a 3.0 x 8 mm Xience Alpine stent.     STENT, CORONARY, S660 15/18  12/2015    VANITA=Diag, PTCA=LAD     STENT, CORONARY, S660 15/18  06/2015    VANITA=LAD       FAMILY HISTORY:  Family History   Problem Relation Age of Onset     HEART DISEASE Brother 40     CABG     KIDNEY DISEASE Sister      Hypertension Sister      HEART DISEASE Brother      Dilated aorta       SOCIAL HISTORY:  Social History     Social History     Marital status:      Spouse name: N/A     Number of children: N/A     Years of education: N/A     Social History Main Topics     Smoking status: Former Smoker     Types: Cigarettes     Smokeless tobacco: None     Alcohol use No     Drug use: No     Sexual activity: Not Asked     Other Topics Concern     Parent/Sibling W/ Cabg, Mi Or Angioplasty Before 65f 55m? Yes     Caffeine Concern Yes     2 cups daily     Sleep Concern Yes     Special Diet No      more proteins     Exercise Yes     Cardiac rehab      Social History Narrative       Review of Systems:  Skin:  Negative       Eyes:  Positive for glasses    ENT:  Negative      Respiratory:  Positive for dyspnea on exertion occas. dyspnea with exertion    Cardiovascular:    Positive for;lightheadedness;dizziness;exercise intolerance;chest pain dizziness /  lightheadedness  with standing ,  chest pains - from  back- right side -  to  center of chest   Gastroenterology: Positive for heartburn occas heartburn   Genitourinary:  Positive for retention;urgency;hesitancy;decreased urinary stream;urinary frequency;prostate problem;nocturia Dialysis - 3 days per week   Musculoskeletal:  Positive for foot pain;gout;back pain lower back pain - sharp lately   Neurologic:  Positive for numbness or tingling of feet;headaches toes ,  occas headaches - after dialysis  "  Psychiatric:  Positive for sleep disturbances    Heme/Lymph/Imm:  Positive for allergies RX,   Endocrine:  Positive for diabetes      Physical Exam:  Vitals: /64 (BP Location: Right arm, Patient Position: Sitting, Cuff Size: Adult Regular)  Pulse 72  Ht 1.803 m (5' 11\")  Wt 90.3 kg (199 lb)  BMI 27.75 kg/m2    Constitutional:           Skin:           Head:           Eyes:           ENT:           Neck:           Chest:             Cardiac:                    Abdomen:           Vascular:                                          Extremities and Back:                 Neurological:                 CC  Arnoldo Diaz MD   [facility]  [address]  [place] [address]                  "

## 2017-06-07 NOTE — MR AVS SNAPSHOT
After Visit Summary   6/7/2017    Donald Raza    MRN: [id number]           Patient Information     Date Of Birth          1950        Visit Information        Provider Department      6/7/2017 2:30 PM Yuki Hinojosa APRN CNP [facility] [place]        Today's Diagnoses     Coronary artery disease involving native coronary artery of native heart without angina pectoris           Follow-ups after your visit        Your next 10 appointments already scheduled     Jun 09, 2017 12:00 PM CDT   NM MPI WITH LEXISCAN with RHNM1   [facility] ([facility])    [address]  [facility] [address]   [phone]           For a ONE day exam: Allow 3-4 hours for test. For a TWO day exam: Allow 2 hours PER day for test.  You may need to stop some medicines before the test. Follow your doctor s orders. - If you take a beta blocker: Follow your doctor s specific instructions on taking it prior to and on the day of your exam. - If you take Aggrenox or dipyridamole (Persantine, Permole), stop taking it 48 hours before your test. - If you take Viagra, Cialis or Levitra, stop taking it 48 hours before your test. - If you take theophylline or aminophylline, stop taking it 12 hours before your test.  For patients with diabetes: - If you take insulin, call your diabetes care team. Ask if you should take a 1/2 dose the morning of your test. - If you take diabetes medicine by mouth, don t take it on the morning of your test. Bring it with you to take after the test. (If you have questions, call your diabetes care team.)  Do not take nitrates on the day of your test. Do not wear your Nitro-Patch.  Stop all caffeine 12 hours before the test. This includes coffee, tea, soda pop, chocolate and certain medicines (such as Anacin, Excedrin and NoDoz). Also avoid decaf coffee and tea, as these contain small amounts of caffeine.  No alcohol, smoking or  other tobacco for 12 hours before the test.  Stop eating 3 hours before the test. You may drink water.  Please wear a loose two-piece outfit. If you will have an exercise test, bring rubber-soled walking shoes.  When you arrive, please tell us if you: - Have diabetes - Are breastfeeding - May be pregnant - Have a pacemaker of ICD (implantable defibrillator).  Please call your Imaging Department at your exam site with any questions.            Jun 09, 2017  1:30 PM CDT   Ekg Stress Nm Lexiscan with RESRM3   [facility] ([facility])    [address]  [facility] [id number] [phone]            Aug 09, 2017  8:00 AM CDT   Ech Complete with [facility] ([facility])    [address]  [facility] [id number] [phone]           1.  Please bring or wear a comfortable two-piece outfit. 2.  You may eat, drink and take your normal medicines. 3.  For any questions that cannot be answered, please contact the ordering physician ***Please check-in at the [facility] Registration Office located in [address] in the [facility] building. When you are finished registering, please go to [address] and have a seat. The technician will call your name for the test.            Aug 09, 2017  9:00 AM CDT   LAB with RU LAB   [facility] [place] ([facility])    [address]  [facility] [id number] [phone]           Patient must bring picture ID.  Patient should be prepared to give a urine specimen  Please do not eat 10-12 hours before your appointment if you are coming in fasting for labs on lipids, cholesterol, or glucose (sugar).  Pregnant women should follow their Care Team instructions. Water with medications is okay. Do not drink coffee or other fluids.   If you have concerns about taking  your medications, please ask at office or if  "scheduling via Clinithink, send a message by clicking on Secure Messaging, Message Your Care Team.            Aug 11, 2017 10:45 AM CDT   Return Visit with Arnoldo Diaz MD   [facility] [place] ([facility])    [address]  [facility] [id number] [phone]              Future tests that were ordered for you today     Open Future Orders        Priority Expected Expires Ordered    NM Lexiscan stress test (nuc card) Routine 2017            Who to contact     If you have questions or need follow up information about today's clinic visit or your schedule please contact [facility] [place] directly at [phone].  Normal or non-critical lab and imaging results will be communicated to you by [email or web address], letter or phone within 4 business days after the clinic has received the results. If you do not hear from us within 7 days, please contact the clinic through [email or web address] or phone. If you have a critical or abnormal lab result, we will notify you by phone as soon as possible.  Submit refill requests through Clinithink or call your pharmacy and they will forward the refill request to us. Please allow 3 business days for your refill to be completed.          Additional Information About Your Visit        Clinithink Information     Clinithink lets you send messages to your doctor, view your test results, renew your prescriptions, schedule appointments and more. To sign up, go to [email or web address] . Click on \"Log in\" on the left side of the screen, which will take you to the Welcome page. Then click on \"Sign up Now\" on the right side of the page.     You will be asked to enter the access code listed below, as well as some personal information. Please follow the directions to create your username and password.     Your access code is: [email or web address]  Expires: 2017 10:31 AM     Your access code will  in 90 days. " "If you need help or a new code, please call your [place] clinic or [phone].        Care EveryWhere ID     This is your Care EveryWhere ID. This could be used by other organizations to access your [place] medical records  [id number]        Your Vitals Were     Pulse Height BMI (Body Mass Index)             72 1.803 m (5' 11\") 27.75 kg/m2          Blood Pressure from Last 3 Encounters:   06/07/17 124/64   05/16/17 192/78   05/15/17 150/74    Weight from Last 3 Encounters:   06/07/17 90.3 kg (199 lb)   05/16/17 86.6 kg (191 lb)   05/15/17 86.6 kg (191 lb)              We Performed the Following     EKG 12-lead complete w/read (completed today)     Follow-Up with Cardiac Advanced Practice Provider        Primary Care Provider Office Phone # Fax #    Aida Thomas [phone]       [facility] [address]        Thank you!     Thank you for choosing [facility] [place]  for your care. Our goal is always to provide you with excellent care. Hearing back from our patients is one way we can continue to improve our services. Please take a few minutes to complete the written survey that you may receive in the mail after your visit with us. Thank you!             Your Updated Medication List - Protect others around you: Learn how to safely use, store and throw away your medicines at www.disposemymeds.org.          This list is accurate as of: 6/7/17  3:55 PM.  Always use your most recent med list.                   Brand Name Dispense Instructions for use    abacavir 300 MG tablet    ZIAGEN     Take 600 mg by mouth every evening       albuterol 108 (90 BASE) MCG/ACT Inhaler    PROAIR HFA/PROVENTIL HFA/VENTOLIN HFA    1 Inhaler    Inhale 2 puffs into the lungs every 6 hours as needed for shortness of breath / dyspnea or wheezing       * amLODIPine 5 MG tablet    NORVASC     Take 5 mg by mouth daily as needed (only uses when blood " pressure greater than 150-160)       * amLODIPine 2.5 MG tablet    NORVASC    30 tablet    Take 1 tablet (2.5 mg) by mouth daily       ANUCORT-HC 25 MG Suppository   Generic drug:  hydrocortisone      Place 25 mg rectally 2 times daily as needed       atorvastatin 40 MG tablet    LIPITOR     Take 40 mg by mouth At Bedtime       B complex-C-folic acid 1 MG capsule      Take 1 capsule by mouth every evening       calcium acetate 667 MG Caps capsule    PHOSLO     2,001 mg 3 times daily (with meals) Take 3 capsules three times a day with meals       chlorhexidine 0.12 % solution    PERIDEX     Rinse and gargle 15 ml by mouth twice a day as directed.       CLONAZEPAM PO      Take 0.5 mg by mouth nightly as needed for anxiety (restless legs)       CLOPIDOGREL BISULFATE PO      Take 75 mg by mouth every evening       dapsone 100 MG tablet      Take 100 mg by mouth At Bedtime       DIALYVITE 800/ULTRA D Tabs      Take 1 tablet by mouth daily       dolutegravir 50 MG tablet    TIVICAY     Take 50 mg by mouth At Bedtime       DOXERCALCIFEROL IV      Inject 6 mcg into the vein three times a week (with dialysis)       epoetin brittni 42806 UNIT/ML injection    EPOGEN/PROCRIT     Inject 11,000 Units Subcutaneous three times a week WITH DIALYSIS       * gabapentin 300 MG capsule    NEURONTIN     Take 300 mg by mouth every morning       * gabapentin 300 MG capsule    NEURONTIN     Take 600 mg by mouth every evening       guaiFENesin-dextromethorphan 100-10 MG/5ML syrup    ROBITUSSIN DM    118 mL    Take 5 mLs by mouth every 4 hours as needed for cough       * HumaLOG KWIKpen 100 UNIT/ML injection   Generic drug:  insulin lispro      Inject Subcutaneous 3 times daily (before meals) Sliding scale - 2 units per 50 over 150       * insulin lispro 100 UNIT/ML injection    HumaLOG KWIKpen     Inject 5 Units Subcutaneous 3 times daily (before meals)       HYDRALAZINE HCL PO      Take 25 mg by mouth 2 times daily as needed for high blood  "pressure       imiquimod 5 % cream    ALDARA     Apply topically as needed       insulin glargine 100 UNIT/ML injection    LANTUS     Inject 36 Units Subcutaneous every morning       iron sucrose 20 MG/ML injection    VENOFER     Inject 50 mg into the vein once a week WIth dialysis       isosorbide mononitrate 30 MG 24 hr tablet    IMDUR    30 tablet    Take 2 tablets (60 mg) by mouth every evening       lamISIL AT 1 % cream   Generic drug:  terbinafine      Apply topically 2 times daily as needed       * LOSARTAN POTASSIUM PO      Take 100 mg by mouth daily as needed (with breakfast)       * losartan 100 MG tablet    COZAAR    30 tablet    Take 1 tablet (100 mg) by mouth At Bedtime       MAGNESIUM OXIDE PO      Take 400 mg by mouth At Bedtime       mycophenolate 500 MG tablet    CELLCEPT - GENERIC EQUIVALENT     Take 500 mg by mouth 2 times daily On an empty stomach       NEPRO PO      1-3 times daily       nitroglycerin 0.4 MG sublingual tablet    NITROSTAT    25 tablet    One tablet under the tongue every 5 minutes if needed for chest pain. May repeat every 5 minutes for a maximum of 3 doses in 15 minutes\"       nystatin cream    MYCOSTATIN     Apply topically daily as needed for dry skin       order for DME     1 each    Equipment being ordered: Other: 4WW Treatment Diagnosis: Decreased activity tolerance       PREZISTA PO      Take 800 mg by mouth At Bedtime.       priLOSEC 40 MG capsule   Generic drug:  omeprazole      Take 40 mg by mouth daily 1 hour before dinner       ritonavir 100 MG capsule    NORVIR     Take 1 capsule by mouth At Bedtime       * Notice:  This list has 8 medication(s) that are the same as other medications prescribed for you. Read the directions carefully, and ask your doctor or other care provider to review them with you.      "

## 2017-06-07 NOTE — LETTER
6/7/2017    Cristina Ann Baker, MD [facility] [address] [facility] [address]    RE: Donald Raza       Dear Colleague,    I had the pleasure of seeing Donald Raza in the [facility].    This 67-year-old male presents to [facility] today for a followup visit.  He is a patient of Dr. Diaz seen in our clinic for coronary artery disease and end-stage renal disease, hypertension, mixed hyperlipidemia, HIV positive.      Donald has an extensive cardiovascular history.  He underwent LAD stenting in 06/2015.  Later that year, he developed a non-ST elevated myocardial infarction and underwent stenting to an ostial diagonal vessel and a kissing balloon angioplasty to his LAD.  One year later, he suffered a non-ST elevated myocardial infarction and underwent cutting balloon angioplasty for in-stent restenosis of the diagonal.  Again, in 08/2016, he underwent stenting to his diagonal vessel after a non-ST elevated myocardial infarction.      Since then he has had multiple hospitalizations for chest pain.  He underwent a coronary angiography in 01/2017 showing patency of his stents.  He has also had influenza and prolonged hospitalization in which his beta blocker was stopped due to bradycardia.  He has continued with Plavix.      Two months ago, he underwent a CardioNet monitor after one of his hospitalizations.  This showed sinus rhythm with occasional premature ventricular contractions and no significant arrhythmias or dysrhythmias.      Donald has a history of labile hypertension.  His blood pressure drops quite significantly after dialysis and he has now come upon a regimen of taking amlodipine as needed if he notes elevated blood pressure readings at home.  Otherwise, he has remained on hydralazine, losartan, low dose amlodipine and Imdur as scheduled.      Donald has end-stage renal disease and is getting hemodialysis 3 times a week.  He  was on the transplant list but no longer is a candidate and has been removed.  He has had markedly elevated triglyceride levels in the past and was recently asked to undergo repeat lipid panel.  He has been on atorvastatin.  He returns today for reassessment.      Donald tells me for the last week he has had upper back pain that radiates around the side to his chest.  This occurs at rest and movement does not make this pain any better.  At times he becomes more severe and he will take sublingual nitroglycerin which takes away his pain.  However, it has been prolonged and persistent for about a week.  It does not improve with rest.  He tells me in the past when he has had his myocardial infarction, he has had back pain and at times it has been chest pain.  Reviewing records from 08/2016 at the time of his night in-stent restenosis, he was complaining of upper back pain.        Donald has no significant lightheadedness.  He has not felt any palpitations or near-syncope.  He tells me his blood pressures have been better controlled in the outpatient setting.  He has not experienced any significant peripheral edema.  He recently did undergo an MRI of his spine in regards to leg weakness which did show some disk protrusion T6 through T7 and T9 through T10.      His blood pressure today is 124/64, heart rate is 72 beats per minute and is regular.  His lungs are clear.  There is no peripheral edema.  Weight 199 pounds.        I did have him undergo an EKG in our office, which I reviewed myself.  This shows sinus rhythm, heart rate is 70 beats per minute.  There are no significant ST changes.  I also had him undergo a troponin level which was normal and calculated at 0.016.     Outpatient Encounter Prescriptions as of 6/7/2017   Medication Sig Dispense Refill     insulin glargine (LANTUS) 100 UNIT/ML injection Inject 36 Units Subcutaneous every morning       amLODIPine (NORVASC) 2.5 MG tablet Take 1 tablet (2.5 mg) by mouth  daily 30 tablet 0     amLODIPine (NORVASC) 5 MG tablet Take 5 mg by mouth daily as needed (only uses when blood pressure greater than 150-160)       HYDRALAZINE HCL PO Take 25 mg by mouth 2 times daily as needed for high blood pressure       [DISCONTINUED] LOSARTAN POTASSIUM PO Take 100 mg by mouth daily as needed (with breakfast)       albuterol (PROAIR HFA/PROVENTIL HFA/VENTOLIN HFA) 108 (90 BASE) MCG/ACT Inhaler Inhale 2 puffs into the lungs every 6 hours as needed for shortness of breath / dyspnea or wheezing 1 Inhaler 1     [DISCONTINUED] guaiFENesin-dextromethorphan (ROBITUSSIN DM) 100-10 MG/5ML syrup Take 5 mLs by mouth every 4 hours as needed for cough 118 mL 0     Nutritional Supplements (NEPRO PO) 1-3 times daily       omeprazole (PRILOSEC) 40 MG capsule Take 40 mg by mouth daily 1 hour before dinner       [DISCONTINUED] B complex-C-folic acid (NEPHROCAPS/TRIPHROCAPS) 1 MG capsule Take 1 capsule by mouth every evening       insulin lispro (HUMALOG KWIKPEN) 100 UNIT/ML injection Inject 5 Units Subcutaneous 3 times daily (before meals)       order for DME Equipment being ordered: Other: 4WW  Treatment Diagnosis: Decreased activity tolerance 1 each 0     [DISCONTINUED] insulin lispro (HUMALOG KWIKPEN) 100 UNIT/ML injection Inject Subcutaneous 3 times daily (before meals) Sliding scale - 2 units per 50 over 150       [DISCONTINUED] Multiple Vitamins-Minerals (DIALYVITE 800/ULTRA D) TABS Take 1 tablet by mouth daily       mycophenolate (CELLCEPT - GENERIC EQUIVALENT) 500 MG tablet Take 500 mg by mouth 2 times daily On an empty stomach       losartan (COZAAR) 100 MG tablet Take 1 tablet (100 mg) by mouth At Bedtime 30 tablet 0     [DISCONTINUED] isosorbide mononitrate (IMDUR) 30 MG 24 hr tablet Take 2 tablets (60 mg) by mouth every evening 30 tablet 0     gabapentin (NEURONTIN) 300 MG capsule Take 600 mg by mouth every evening       imiquimod (ALDARA) 5 % cream Apply topically as needed       DOXERCALCIFEROL IV  "Inject 6 mcg into the vein three times a week (with dialysis)       [DISCONTINUED] nystatin (MYCOSTATIN) cream Apply topically daily as needed for dry skin       CLONAZEPAM PO Take 0.5 mg by mouth nightly as needed for anxiety (restless legs)       CLOPIDOGREL BISULFATE PO Take 75 mg by mouth every evening        abacavir (ZIAGEN) 300 MG tablet Take 600 mg by mouth every evening        chlorhexidine (CHLORHEXIDINE) 0.12 % solution Rinse and gargle 15 ml by mouth twice a day as directed.       terbinafine (LAMISIL AT) 1 % cream Apply topically 2 times daily as needed        [DISCONTINUED] calcium acetate (PHOSLO) 667 MG CAPS 2,001 mg 3 times daily (with meals) Take 3 capsules three times a day with meals       [DISCONTINUED] hydrocortisone (ANUCORT-HC) 25 MG suppository Place 25 mg rectally 2 times daily as needed        atorvastatin (LIPITOR) 40 MG tablet Take 40 mg by mouth At Bedtime       epoetin brittni (EPOGEN,PROCRIT) 11357 UNIT/ML injection Inject 11,000 Units Subcutaneous three times a week WITH DIALYSIS       iron sucrose (VENOFER) 20 MG/ML injection Inject 50 mg into the vein once a week WIth dialysis       MAGNESIUM OXIDE PO Take 400 mg by mouth At Bedtime       dolutegravir (TIVICAY) 50 MG tablet Take 50 mg by mouth At Bedtime       nitroglycerin (NITROSTAT) 0.4 MG SL tablet One tablet under the tongue every 5 minutes if needed for chest pain. May repeat every 5 minutes for a maximum of 3 doses in 15 minutes\" 25 tablet 3     gabapentin (NEURONTIN) 300 MG capsule Take 300 mg by mouth every morning        dapsone 100 MG tablet Take 100 mg by mouth At Bedtime        Darunavir Ethanolate (PREZISTA PO) Take 800 mg by mouth At Bedtime.       ritonavir (NORVIR) 100 MG capsule Take 1 capsule by mouth At Bedtime        [DISCONTINUED] HYDRALAZINE HCL PO Take 25 mg by mouth daily       No facility-administered encounter medications on file as of 6/7/2017.       IMPRESSION AND PLAN:   1.  Coronary artery disease.  " Extensive cardiovascular history with stenting to his LAD, diagonal vessel OM vessel multiple times in 2015 and 2016.  Since then he has had some atypical type chest pain and a repeat stress test and coronary angiography in 01/2017 showed patency of his stents.  Today, he has been complaining of a week long complaint of upper back pain that gets worse at times.  Imdur does seem to help this at times.  However, this is quite atypical for angina.  Today, fortunately, his troponin is normal.  There are no significant ST abnormalities on his electrocardiogram.   I further reviewed this with Dr. Diaz and the plan is to proceed with a Lexiscan nuclear stress test.  I will review this with him in the future over the phone.  We will continue with Imdur, ARB therapy and amlodipine.  Unfortunately, he is not on beta blocker due to significant bradycardia.   1.  Hypertension.  Blood pressure controlled.  He uses as-needed amlodipine when his blood pressure elevates.     2.  End-stage renal disease on hemodialysis, now off of the transplant list.   3.  HIV positive.   4.  Hyperlipidemia.  I reviewed his lipid panel with him showing improvement in his triglyceride levels now for 373 (down from 1297).  His LDL level is at goal on atorvastatin.      Thank you for allowing me to participate in this patient's care.  He has followup with Dr. Diaz planned in 2 months.     Sincerely,    DEDRICK Fuller CNP     [facility]

## 2017-06-08 NOTE — PROGRESS NOTES
HISTORY OF PRESENT ILLNESS:  This 67-year-old male presents to [facility] today for a followup visit.  He is a patient of Dr. Diaz seen in our clinic for coronary artery disease and end-stage renal disease, hypertension, mixed hyperlipidemia, HIV positive.      Donald has an extensive cardiovascular history.  He underwent LAD stenting in 06/2015.  Later that year, he developed a non-ST elevated myocardial infarction and underwent stenting to an ostial diagonal vessel and a kissing balloon angioplasty to his LAD.  One year later, he suffered a non-ST elevated myocardial infarction and underwent cutting balloon angioplasty for in-stent restenosis of the diagonal.  Again, in 08/2016, he underwent stenting to his diagonal vessel after a non-ST elevated myocardial infarction.      Since then he has had multiple hospitalizations for chest pain.  He underwent a coronary angiography in 01/2017 showing patency of his stents.  He has also had influenza and prolonged hospitalization in which his beta blocker was stopped due to bradycardia.  He has continued with Plavix.      Two months ago, he underwent a CardioNet monitor after one of his hospitalizations.  This showed sinus rhythm with occasional premature ventricular contractions and no significant arrhythmias or dysrhythmias.      Donald has a history of labile hypertension.  His blood pressure drops quite significantly after dialysis and he has now come upon a regimen of taking amlodipine as needed if he notes elevated blood pressure readings at home.  Otherwise, he has remained on hydralazine, losartan, low dose amlodipine and Imdur as scheduled.      Donald has end-stage renal disease and is getting hemodialysis 3 times a week.  He was on the transplant list but no longer is a candidate and has been removed.  He has had markedly elevated triglyceride levels in the past and was recently asked to undergo repeat lipid panel.  He has been on  atorvastatin.  He returns today for reassessment.      Donald tells me for the last week he has had upper back pain that radiates around the side to his chest.  This occurs at rest and movement does not make this pain any better.  At times he becomes more severe and he will take sublingual nitroglycerin which takes away his pain.  However, it has been prolonged and persistent for about a week.  It does not improve with rest.  He tells me in the past when he has had his myocardial infarction, he has had back pain and at times it has been chest pain.  Reviewing records from 08/2016 at the time of his night in-stent restenosis, he was complaining of upper back pain.        Donald has no significant lightheadedness.  He has not felt any palpitations or near-syncope.  He tells me his blood pressures have been better controlled in the outpatient setting.  He has not experienced any significant peripheral edema.  He recently did undergo an MRI of his spine in regards to leg weakness which did show some disk protrusion T6 through T7 and T9 through T10.      His blood pressure today is 124/64, heart rate is 72 beats per minute and is regular.  His lungs are clear.  There is no peripheral edema.  Weight 199 pounds.        I did have him undergo an EKG in our office, which I reviewed myself.  This shows sinus rhythm, heart rate is 70 beats per minute.  There are no significant ST changes.  I also had him undergo a troponin level which was normal and calculated at 0.016.      IMPRESSION AND PLAN:   1.  Coronary artery disease.  Extensive cardiovascular history with stenting to his LAD, diagonal vessel OM vessel multiple times in 2015 and 2016.  Since then he has had some atypical type chest pain and a repeat stress test and coronary angiography in 01/2017 showed patency of his stents.  Today, he has been complaining of a week long complaint of upper back pain that gets worse at times.  Imdur does seem to help this at times.   However, this is quite atypical for angina.  Today, fortunately, his troponin is normal.  There are no significant ST abnormalities on his electrocardiogram.   I further reviewed this with Dr. Diaz and the plan is to proceed with a Lexiscan nuclear stress test.  I will review this with him in the future over the phone.  We will continue with Imdur, ARB therapy and amlodipine.  Unfortunately, he is not on beta blocker due to significant bradycardia.   1.  Hypertension.  Blood pressure controlled.  He uses as-needed amlodipine when his blood pressure elevates.     2.  End-stage renal disease on hemodialysis, now off of the transplant list.   3.  HIV positive.   4.  Hyperlipidemia.  I reviewed his lipid panel with him showing improvement in his triglyceride levels now for 373 (down from 1297).  His LDL level is at goal on atorvastatin.      Thank you for allowing me to participate in this patient's care.  He has followup with Dr. Diaz planned in 2 months.         DEDRICK STEVENS, THOMAS             D: 2017 16:41   T: 2017 13:19   MT: CARLITOS      Name:     GREGORIO BRUSH   MRN:      [id number]        Account:      [id number]   :      1950           Service Date: 2017      Document: [id number]

## 2017-06-09 ENCOUNTER — HOSPITAL ENCOUNTER (OUTPATIENT)
Dept: CARDIOLOGY | Facility: CLINIC | Age: 67
End: 2017-06-09
Attending: NURSE PRACTITIONER
Payer: MEDICARE

## 2017-06-09 ENCOUNTER — HOSPITAL ENCOUNTER (OUTPATIENT)
Dept: NUCLEAR MEDICINE | Facility: CLINIC | Age: 67
Setting detail: NUCLEAR MEDICINE
Discharge: HOME | End: 2017-06-09
Attending: NURSE PRACTITIONER | Admitting: NURSE PRACTITIONER
Payer: MEDICARE

## 2017-06-09 DIAGNOSIS — I25.10 CORONARY ARTERY DISEASE INVOLVING NATIVE CORONARY ARTERY OF NATIVE HEART WITHOUT ANGINA PECTORIS: ICD-10-CM

## 2017-06-09 PROCEDURE — 34300033 ZZH RX 343: Performed by: NURSE PRACTITIONER

## 2017-06-09 PROCEDURE — 93016 CV STRESS TEST SUPVJ ONLY: CPT | Performed by: INTERNAL MEDICINE

## 2017-06-09 PROCEDURE — 93018 CV STRESS TEST I&R ONLY: CPT | Performed by: INTERNAL MEDICINE

## 2017-06-09 PROCEDURE — 78452 HT MUSCLE IMAGE SPECT MULT: CPT

## 2017-06-09 PROCEDURE — 78452 HT MUSCLE IMAGE SPECT MULT: CPT | Mod: 26 | Performed by: INTERNAL MEDICINE

## 2017-06-09 PROCEDURE — A9502 TC99M TETROFOSMIN: HCPCS | Performed by: NURSE PRACTITIONER

## 2017-06-09 RX ORDER — REGADENOSON 0.08 MG/ML
INJECTION, SOLUTION INTRAVENOUS
Status: COMPLETED
Start: 2017-06-09 | End: 2017-06-09

## 2017-06-09 RX ADMIN — TETROFOSMIN 11 MCI.: 1.38 INJECTION, POWDER, LYOPHILIZED, FOR SOLUTION INTRAVENOUS at 11:43

## 2017-06-09 RX ADMIN — REGADENOSON 0.4 MG: 0.08 INJECTION, SOLUTION INTRAVENOUS at 13:54

## 2017-06-09 RX ADMIN — TETROFOSMIN 31.7 MCI.: 1.38 INJECTION, POWDER, LYOPHILIZED, FOR SOLUTION INTRAVENOUS at 14:48

## 2017-06-09 NOTE — PROGRESS NOTES
Pre-procedure:    Initial vital signs: /68, HR 70, RR 11  Allergies: reviewed   Rhythm:   Medications taken within 48 hours of procedure:    Last Caffeine:   Lung sounds: CTA, no wheezing, crackles or rtx  Health History (COPD, Asthma, etc):     Procedure: Lexiscan  Reaction/symptoms after receiving Lindy injection:     Vital Signs:/61, HR 78, RR 14    Reversal agent:     Post:   Resolution of symptoms?: YES  Vital signs: /60, HR 75, RR 12  Walk: NO  Return to Radiology

## 2017-06-11 ENCOUNTER — TELEPHONE (OUTPATIENT)
Dept: CARDIOLOGY | Facility: CLINIC | Age: 67
End: 2017-06-11

## 2017-06-14 NOTE — TELEPHONE ENCOUNTER
Results were reviewed with patient over the phone. I instructed him to stay on his current medication regimen and to keep his appts in August. Patient had no questions.     Gil SILVERMAN  [facility]

## 2017-06-30 ENCOUNTER — TELEPHONE (OUTPATIENT)
Dept: TRANSPLANT | Facility: CLINIC | Age: 67
End: 2017-06-30

## 2017-08-05 ENCOUNTER — APPOINTMENT (OUTPATIENT)
Dept: GENERAL RADIOLOGY | Facility: CLINIC | Age: 67
End: 2017-08-05
Attending: EMERGENCY MEDICINE
Payer: MEDICARE

## 2017-08-05 ENCOUNTER — HOSPITAL ENCOUNTER (EMERGENCY)
Facility: CLINIC | Age: 67
Discharge: HOME | End: 2017-08-06
Attending: EMERGENCY MEDICINE | Admitting: EMERGENCY MEDICINE
Payer: MEDICARE

## 2017-08-05 DIAGNOSIS — R73.9 HYPERGLYCEMIA: ICD-10-CM

## 2017-08-05 DIAGNOSIS — R07.9 CHEST PAIN, UNSPECIFIED TYPE: ICD-10-CM

## 2017-08-05 DIAGNOSIS — M54.6 RIGHT-SIDED THORACIC BACK PAIN, UNSPECIFIED CHRONICITY: ICD-10-CM

## 2017-08-05 LAB
ANION GAP SERPL CALCULATED.3IONS-SCNC: 9 MMOL/L (ref 3–14)
BUN SERPL-MCNC: 39 MG/DL (ref 7–30)
CALCIUM SERPL-MCNC: 9 MG/DL (ref 8.5–10.1)
CHLORIDE SERPL-SCNC: 92 MMOL/L (ref 94–109)
CO2 SERPL-SCNC: 30 MMOL/L (ref 20–32)
CREAT SERPL-MCNC: 5.55 MG/DL (ref 0.66–1.25)
ERYTHROCYTE [DISTWIDTH] IN BLOOD BY AUTOMATED COUNT: 15.8 % (ref 10–15)
GFR SERPL CREATININE-BSD FRML MDRD: 10 ML/MIN/1.7M2
GLUCOSE SERPL-MCNC: 471 MG/DL (ref 70–99)
HCT VFR BLD AUTO: 29.1 % (ref 40–53)
HGB BLD-MCNC: 9.4 G/DL (ref 13.3–17.7)
MCH RBC QN AUTO: 29.3 PG (ref 26.5–33)
MCHC RBC AUTO-ENTMCNC: 32.3 G/DL (ref 31.5–36.5)
MCV RBC AUTO: 91 FL (ref 78–100)
PLATELET # BLD AUTO: 135 10E9/L (ref 150–450)
POTASSIUM SERPL-SCNC: 4.7 MMOL/L (ref 3.4–5.3)
RBC # BLD AUTO: 3.21 10E12/L (ref 4.4–5.9)
SODIUM SERPL-SCNC: 131 MMOL/L (ref 133–144)
WBC # BLD AUTO: 4.2 10E9/L (ref 4–11)

## 2017-08-05 PROCEDURE — 99285 EMERGENCY DEPT VISIT HI MDM: CPT | Mod: 25

## 2017-08-05 PROCEDURE — 25000132 ZZH RX MED GY IP 250 OP 250 PS 637: Mod: GY | Performed by: EMERGENCY MEDICINE

## 2017-08-05 PROCEDURE — 93005 ELECTROCARDIOGRAM TRACING: CPT

## 2017-08-05 PROCEDURE — 85027 COMPLETE CBC AUTOMATED: CPT | Performed by: EMERGENCY MEDICINE

## 2017-08-05 PROCEDURE — 84484 ASSAY OF TROPONIN QUANT: CPT | Performed by: EMERGENCY MEDICINE

## 2017-08-05 PROCEDURE — 71020 XR CHEST 2 VW: CPT

## 2017-08-05 PROCEDURE — 96372 THER/PROPH/DIAG INJ SC/IM: CPT

## 2017-08-05 PROCEDURE — 80048 BASIC METABOLIC PNL TOTAL CA: CPT | Performed by: EMERGENCY MEDICINE

## 2017-08-05 RX ORDER — HYDROCODONE BITARTRATE AND ACETAMINOPHEN 5; 325 MG/1; MG/1
2 TABLET ORAL ONCE
Status: COMPLETED | OUTPATIENT
Start: 2017-08-05 | End: 2017-08-05

## 2017-08-05 RX ADMIN — HYDROCODONE BITARTRATE AND ACETAMINOPHEN 2 TABLET: 5; 325 TABLET ORAL at 23:39

## 2017-08-05 NOTE — ED AVS SNAPSHOT
[facility]    [address]    [facility] [address]    Phone:  [phone]    Fax:  [phone]                                       Donald Raza   MRN: [id number]    Department:  [facility]   Date of Visit:  8/5/2017           After Visit Summary Signature Page     I have received my discharge instructions, and my questions have been answered. I have discussed any challenges I see with this plan with the nurse or doctor.    ..........................................................................................................................................  Patient/Patient Representative Signature      ..........................................................................................................................................  Patient Representative Print Name and Relationship to Patient    ..................................................               ................................................  Date                                            Time    ..........................................................................................................................................  Reviewed by Signature/Title    ...................................................              ..............................................  Date                                                            Time

## 2017-08-05 NOTE — ED AVS SNAPSHOT
[facility]    [address]    [facility] [address]    Phone:  [phone]    Fax:  [phone]                                       Donald Raza   MRN: [id number]    Department:  [facility] Emergency [facility]   Date of Visit:  8/5/2017           Patient Information     Date Of Birth          1950        Your diagnoses for this visit were:     Chest pain, unspecified type     Right-sided thoracic back pain, unspecified chronicity     Hyperglycemia        You were seen by Al Seaman MD.      Follow-up Information     Follow up with Aida Thomas MD. Schedule an appointment as soon as possible for a visit in 2 days.    Specialty:  Internal Medicine    Contact information:    [address]  [facility] [address]  [phone]          Discharge Instructions       Discharge Instructions  Chest Pain    You have been seen today for chest pain or discomfort.  At this time, your doctor has found no signs that your chest pain is due to a serious or life-threatening condition, (or you have declined more testing and/or admission to the hospital). However, sometimes there is a serious problem that does not show up right away. Your evaluation today may not be complete and you may need further testing and evaluation.     You need to follow-up with your regular doctor within 3 days.    Return to the Emergency Department if:    Your chest pain changes, gets worse, starts to happen more often, or comes with less activity.    You are short of breath.    You get very weak or tired.    You pass out or faint.    You have any new symptoms, like fever, cough, numb legs, or you cough up blood.    You have anything else that worries you.    Until you follow-up with your regular doctor please do the following:    Take one aspirin daily unless you have an allergy or are told not to by your doctor.    If a stress test appointment has  been made, go to the appointment.    If you have questions, contact your regular doctor.    If your doctor today has told you to follow-up with your regular doctor, it is very important that you make an appointment with your clinic and go to the appointment.  If you do not follow-up with your primary doctor, it may result in missing an important development which could result in permanent injury or disability and/or lasting pain.  If there is any problem keeping your appointment, call your doctor or return to the Emergency Department.    If you were given a prescription for medicine here today, be sure to read all of the information (including the package insert) that comes with your prescription.  This will include important information about the medicine, its side effects, and any warnings that you need to know about.  The pharmacist who fills the prescription can provide more information and answer questions you may have about the medicine.  If you have questions or concerns that the pharmacist cannot address, please call or return to the Emergency Department.       Discharge Instructions  Back Pain  You were seen today for back pain. Back pain can have many causes, but most will get better without surgery or other specific treatment. Sometimes there is a herniated ( slipped ) disc. We don t usually do MRI scans to look for these right away, since most herniated discs will get better on their own with time.  Today, we did not find any evidence that your back pain was caused by a serious condition, such as an infection, fracture, or tumor. However, sometimes symptoms develop over time and cannot be found during an emergency visit, so it is very important that you follow up with your primary doctor.  Return to the Emergency Department if:    You develop a fever with your back pain.     You have weakness or change in sensation in one or both legs.    You lose control of your bowels or bladder, or can t empty your  bladder.    Your pain gets much worse.     Follow-up with your doctor:    Unless your pain has completely gone away, please make an appointment with your doctor within one week.  You may need further management of your back pain, such as more pain medication, imaging such as an X-ray or MRI, or physical therapy.    What can I do to help myself?    Remain Active -- People are often afraid that they will hurt their back further or delay recovery by remaining active, but this is one of the best things you can do for your back. In fact, prolonged bed rest is not recommended. Studies have shown that people with low back pain recover faster when they remain active. Movement helps to bring blood flow to the muscles and relieve muscle spasms as well as preventing loss of muscle strength.    Heat -- Using a heating pad can help with low back pain during the first few weeks. Do not sleep with a heating pad, as you can be burned.     Pain medications - You may take a pain medication such as Tylenol  (acetaminophen), Advil , Nuprin  (ibuprofen) or Aleve  (naproxen).  If you have been given a narcotic such as Vicodin  (hydrocodone with acetaminophen), Percocet  (oxycodone with acetaminophen), codeine, or a muscle relaxant such as Flexeril  (cyclobenzaprine) or Soma  (carisoprodol), do not drive for four hours after you have taken it. If the narcotic contains Tylenol  (acetaminophen), do not take Tylenol  with it. All narcotics will cause constipation, so eat a high fiber diet.   If you were given a prescription for medicine here today, be sure to read all of the information (including the package insert) that comes with your prescription.  This will include important information about the medicine, its side effects, and any warnings that you need to know about.  The pharmacist who fills the prescription can provide more information and answer questions you may have about the medicine.  If you have questions or concerns that the  pharmacist cannot address, please call or return to the Emergency Department.   Discharge Instructions  Hyperglycemia, High Blood Sugar    Today we found your blood sugar (glucose) was high. This may mean that you have developed diabetes, or if you already know that you have diabetes, it may mean that your diabetes is not as well controlled as it should be. Signs of elevated blood sugar include increased thirst, frequent urination, blurred vision, fatigue, unexplained weight loss, poor wound healing, and frequent infections.     We sometimes give medicine in the Emergency Department to lower the blood sugar. We may also prescribe medicine for you to use at home, or increase the medicine that you already take. While we don t like to see your blood sugar high, it is much more dangerous to let your blood sugar get too low, so it is reasonable to take time to bring it down, or to wait and watch to see if it comes down on its own.     It is very important that you go to see your regular doctor to have additional tests to see what the high blood sugar test means in your case. See your doctor as directed today, or within 1 week.    Return to the Emergency Department if you develop:    Nausea and vomiting.    Confusion, disorientation, or being unable to wake up.    Fever greater than 101.5.    Blood sugar greater than 400.    Abdominal pain.    What can I do to help myself?    Check your blood sugar as instructed by your doctor.    Take medications prescribed by your doctor.    Follow a diabetic diet (low fat, low concentrated sweets, high fiber).    Exercise regularly.    Moderate or eliminate alcohol use.    Stop smoking.    Diabetes mellitus is a disease in which the body cannot regulate the amount of sugar (glucose) in the blood. Insulin allows glucose to move out of the blood into cells throughout the body where it is used for fuel. People with diabetes do not produce enough insulin (type 1 diabetes), or cannot use  insulin properly (type 2 diabetes), or both. This starves the cells that need the glucose for fuel, and also harms certain organs and tissues exposed to the high glucose levels.  Over a long period of time, uncontrolled diabetes can lead to heart and blood vessel disease, blindness, kidney failure, foot ulcers and many other problems.    About 17 million Americans (6.2% of adults) have diabetes. We think that about one third of adults with diabetes do not know they have diabetes.  The incidence of diabetes is increasing rapidly. This increase is due to many factors, but the most significant are our increasing weight and decreased activity levels.     Diabetes can be a very serious and life-threatening illness if not treated.  If you were given a prescription for medicine here today, be sure to read all of the information (including the package insert) that comes with your prescription.  This will include important information about the medicine, its side effects, and any warnings that you need to know about.  The pharmacist who fills the prescription can provide more information and answer questions you may have about the medicine.  If you have questions or concerns that the pharmacist cannot address, please call or return to the Emergency Department.       Future Appointments        Provider Department Dept Phone Center    8/9/2017 8:00 AM [facility] [phone] [facility]    8/9/2017 9:00 AM [facility] [place] [phone] [facility]    8/11/2017 10:45 AM Arnoldo Diaz MD [facility] [place] [phone] [facility]      24 Hour Appointment Hotline       To make an appointment at any [facility], call [phone] ([phone]). If you don't have a family doctor or clinic, we will help you find one. [facility] are conveniently located to serve the needs of you and your family.              Review of your medicines      Our records show that you are taking the medicines listed below. If these are incorrect, please call your family doctor or clinic.        Dose / Directions Last dose taken    abacavir 300 MG tablet   Commonly known as:  ZIAGEN   Dose:  600 mg        Take 600 mg by mouth every evening   Refills:  0        albuterol 108 (90 BASE) MCG/ACT Inhaler   Commonly known as:  PROAIR HFA/PROVENTIL HFA/VENTOLIN HFA   Dose:  2 puff   Quantity:  1 Inhaler        Inhale 2 puffs into the lungs every 6 hours as needed for shortness of breath / dyspnea or wheezing   Refills:  1        * amLODIPine 5 MG tablet   Commonly known as:  NORVASC   Dose:  5 mg        Take 5 mg by mouth daily as needed (only uses when blood pressure greater than 150-160)   Refills:  0        * amLODIPine 2.5 MG tablet   Commonly known as:  NORVASC   Dose:  2.5 mg   Quantity:  30 tablet        Take 1 tablet (2.5 mg) by mouth daily   Refills:  0        ANUCORT-HC 25 MG Suppository   Dose:  25 mg   Generic drug:  hydrocortisone        Place 25 mg rectally 2 times daily as needed   Refills:  0        atorvastatin 40 MG tablet   Commonly known as:  LIPITOR   Dose:  40 mg        Take 40 mg by mouth At Bedtime   Refills:  0        B complex-C-folic acid 1 MG capsule   Dose:  1 capsule        Take 1 capsule by mouth every evening   Refills:  0        calcium acetate 667 MG Caps capsule   Commonly known as:  PHOSLO   Dose:  2001 mg        2,001 mg 3 times daily (with meals) Take 3 capsules three times a day with meals   Refills:  0        chlorhexidine 0.12 % solution   Commonly known as:  PERIDEX        Rinse and gargle 15 ml by mouth twice a day as directed.   Refills:  0        CLONAZEPAM PO   Dose:  0.5 mg        Take 0.5 mg by mouth nightly as needed for anxiety (restless legs)   Refills:  0        CLOPIDOGREL BISULFATE PO   Dose:  75 mg        Take 75 mg by mouth every evening   Refills:  0        dapsone 100 MG tablet   Dose:   100 mg        Take 100 mg by mouth At Bedtime   Refills:  0        DIALYVITE 800/ULTRA D Tabs   Dose:  1 tablet        Take 1 tablet by mouth daily   Refills:  0        dolutegravir 50 MG tablet   Commonly known as:  TIVICAY   Dose:  50 mg        Take 50 mg by mouth At Bedtime   Refills:  0        DOXERCALCIFEROL IV   Dose:  6 mcg        Inject 6 mcg into the vein three times a week (with dialysis)   Refills:  0        epoetin brittni 15494 UNIT/ML injection   Commonly known as:  EPOGEN/PROCRIT   Dose:  12515 Units        Inject 11,000 Units Subcutaneous three times a week WITH DIALYSIS   Refills:  0        * gabapentin 300 MG capsule   Commonly known as:  NEURONTIN   Dose:  300 mg        Take 300 mg by mouth every morning   Refills:  0        * gabapentin 300 MG capsule   Commonly known as:  NEURONTIN   Dose:  600 mg        Take 600 mg by mouth every evening   Refills:  0        guaiFENesin-dextromethorphan 100-10 MG/5ML syrup   Commonly known as:  ROBITUSSIN DM   Dose:  5 mL   Quantity:  118 mL        Take 5 mLs by mouth every 4 hours as needed for cough   Refills:  0        * HumaLOG KWIKpen 100 UNIT/ML injection   Generic drug:  insulin lispro        Inject Subcutaneous 3 times daily (before meals) Sliding scale - 2 units per 50 over 150   Refills:  0        * insulin lispro 100 UNIT/ML injection   Commonly known as:  HumaLOG KWIKpen   Dose:  5 Units        Inject 5 Units Subcutaneous 3 times daily (before meals)   Refills:  0        HYDRALAZINE HCL PO   Dose:  25 mg        Take 25 mg by mouth 2 times daily as needed for high blood pressure   Refills:  0        imiquimod 5 % cream   Commonly known as:  ALDARA        Apply topically as needed   Refills:  0        insulin glargine 100 UNIT/ML injection   Commonly known as:  LANTUS   Dose:  36 Units        Inject 36 Units Subcutaneous every morning   Refills:  0        iron sucrose 20 MG/ML injection   Commonly known as:  VENOFER   Dose:  50 mg        Inject 50 mg  "into the vein once a week WIth dialysis   Refills:  0        isosorbide mononitrate 30 MG 24 hr tablet   Commonly known as:  IMDUR   Dose:  60 mg   Quantity:  30 tablet        Take 2 tablets (60 mg) by mouth every evening   Refills:  0        lamISIL AT 1 % cream   Generic drug:  terbinafine        Apply topically 2 times daily as needed   Refills:  0        * LOSARTAN POTASSIUM PO   Dose:  100 mg        Take 100 mg by mouth daily as needed (with breakfast)   Refills:  0        * losartan 100 MG tablet   Commonly known as:  COZAAR   Dose:  100 mg   Quantity:  30 tablet        Take 1 tablet (100 mg) by mouth At Bedtime   Refills:  0        MAGNESIUM OXIDE PO   Dose:  400 mg        Take 400 mg by mouth At Bedtime   Refills:  0        mycophenolate 500 MG tablet   Commonly known as:  CELLCEPT - GENERIC EQUIVALENT   Dose:  500 mg        Take 500 mg by mouth 2 times daily On an empty stomach   Refills:  0        NEPRO PO        1-3 times daily   Refills:  0        nitroGLYcerin 0.4 MG sublingual tablet   Commonly known as:  NITROSTAT   Quantity:  25 tablet        One tablet under the tongue every 5 minutes if needed for chest pain. May repeat every 5 minutes for a maximum of 3 doses in 15 minutes\"   Refills:  3        nystatin cream   Commonly known as:  MYCOSTATIN        Apply topically daily as needed for dry skin   Refills:  0        order for DME   Quantity:  1 each        Equipment being ordered: Other: 4WW Treatment Diagnosis: Decreased activity tolerance   Refills:  0        PREZISTA PO   Dose:  800 mg        Take 800 mg by mouth At Bedtime.   Refills:  0        priLOSEC 40 MG capsule   Dose:  40 mg   Generic drug:  omeprazole        Take 40 mg by mouth daily 1 hour before dinner   Refills:  0        ritonavir 100 MG capsule   Commonly known as:  NORVIR   Dose:  1 capsule        Take 1 capsule by mouth At Bedtime   Refills:  0        * Notice:  This list has 8 medication(s) that are the same as other medications " prescribed for you. Read the directions carefully, and ask your doctor or other care provider to review them with you.            Procedures and tests performed during your visit     Basic metabolic panel (BMP)    CBC (platelets, no diff)    Chest XR,  PA & LAT    Troponin I      Orders Needing Specimen Collection     None      Pending Results     Date and Time Order Name Status Description    8/5/2017 2315 Chest XR,  PA & LAT Preliminary             Pending Culture Results     No orders found for last 3 day(s).            Pending Results Instructions     If you had any lab results that were not finalized at the time of your Discharge, you can call the ED Lab Result RN at [phone]. You will be contacted by this team for any positive Lab results or changes in treatment. The nurses are available 7 days a week from 10A to 6:30P.  You can leave a message 24 hours per day and they will return your call.        Test Results From Your Hospital Stay        8/5/2017 11:43 PM      Component Results     Component Value Ref Range & Units Status    WBC 4.2 4.0 - 11.0 10e9/L Final    RBC Count 3.21 (L) 4.4 - 5.9 10e12/L Final    Hemoglobin 9.4 (L) 13.3 - 17.7 g/dL Final    Hematocrit 29.1 (L) 40.0 - 53.0 % Final    MCV 91 78 - 100 fl Final    MCH 29.3 26.5 - 33.0 pg Final    MCHC 32.3 31.5 - 36.5 g/dL Final    RDW 15.8 (H) 10.0 - 15.0 % Final    Platelet Count 135 (L) 150 - 450 10e9/L Final         8/5/2017 11:59 PM      Component Results     Component Value Ref Range & Units Status    Sodium 131 (L) 133 - 144 mmol/L Final    Potassium 4.7 3.4 - 5.3 mmol/L Final    Chloride 92 (L) 94 - 109 mmol/L Final    Carbon Dioxide 30 20 - 32 mmol/L Final    Anion Gap 9 3 - 14 mmol/L Final    Glucose 471 (H) 70 - 99 mg/dL Final    Urea Nitrogen 39 (H) 7 - 30 mg/dL Final    Creatinine 5.55 (H) 0.66 - 1.25 mg/dL Final    GFR Estimate 10 (L) >60 mL/min/1.7m2 Final    Non  GFR Calc    GFR Estimate If Black 12 (L) >60  mL/min/1.7m2 Final     GFR Calc    Calcium 9.0 8.5 - 10.1 mg/dL Final         8/6/2017 12:21 AM      Narrative     XR CHEST 2 VIEWS  8/6/2017 12:01 AM      HISTORY: Chest pain.     COMPARISON: 4/15/2017.    FINDINGS: The heart size is normal. The lungs are clear. No  pneumothorax or pleural effusion.        Impression     IMPRESSION:  No acute abnormality.         8/6/2017 12:34 AM      Component Results     Component Value Ref Range & Units Status    Troponin I ES 0.022 0.000 - 0.045 ug/L Final    The 99th percentile for upper reference range is 0.045 ug/L.  Troponin values   in   the range of 0.045 - 0.120 ug/L may be associated with risks of adverse   clinical events.                  Clinical Quality Measure: Blood Pressure Screening     Your blood pressure was checked while you were in the emergency department today. The last reading we obtained was  BP: 167/84 . Please read the guidelines below about what these numbers mean and what you should do about them.  If your systolic blood pressure (the top number) is less than 120 and your diastolic blood pressure (the bottom number) is less than 80, then your blood pressure is normal. There is nothing more that you need to do about it.  If your systolic blood pressure (the top number) is 120-139 or your diastolic blood pressure (the bottom number) is 80-89, your blood pressure may be higher than it should be. You should have your blood pressure rechecked within a year by a primary care provider.  If your systolic blood pressure (the top number) is 140 or greater or your diastolic blood pressure (the bottom number) is 90 or greater, you may have high blood pressure. High blood pressure is treatable, but if left untreated over time it can put you at risk for heart attack, stroke, or kidney failure. You should have your blood pressure rechecked by a primary care provider within the next 4 weeks.  If your provider in the emergency department today gave  "you specific instructions to follow-up with your doctor or provider even sooner than that, you should follow that instruction and not wait for up to 4 weeks for your follow-up visit.        Thank you for choosing [facility]       Thank you for choosing [facility] for your care. Our goal is always to provide you with excellent care. Hearing back from our patients is one way we can continue to improve our services. Please take a few minutes to complete the written survey that you may receive in the mail after you visit with us. Thank you!        [employer] Information     [employer] lets you send messages to your doctor, view your test results, renew your prescriptions, schedule appointments and more. To sign up, go to [email or web address] . Click on \"Log in\" on the left side of the screen, which will take you to the Welcome page. Then click on \"Sign up Now\" on the right side of the page.     You will be asked to enter the access code listed below, as well as some personal information. Please follow the directions to create your username and password.     Your access code is: [email or web address]  Expires: 2017  1:01 AM     Your access code will  in 90 days. If you need help or a new code, please call your [facility] clinic or [phone].        Care EveryWhere ID     This is your Care EveryWhere ID. This could be used by other organizations to access your [facility] medical records  [id number]        Equal Access to Services     JOON KERN : Hadii liza Dolan, joseda marlena, qawon dayalfatoumata brooks . So [facility] [phone].    ATENCIÓN: Si habla español, tiene a ayala disposición servicios gratuitos de asistencia lingüística. Joseame al [phone].    We comply with applicable federal civil rights laws and [place] laws. We do not discriminate on the basis of race, color, national origin, age, disability sex, sexual orientation or gender identity.            After " Visit Summary       This is your record. Keep this with you and show to your community pharmacist(s) and doctor(s) at your next visit.

## 2017-08-06 VITALS
WEIGHT: 198.41 LBS | BODY MASS INDEX: 27.67 KG/M2 | RESPIRATION RATE: 20 BRPM | TEMPERATURE: 97.6 F | HEART RATE: 86 BPM | OXYGEN SATURATION: 92 % | SYSTOLIC BLOOD PRESSURE: 155 MMHG | DIASTOLIC BLOOD PRESSURE: 84 MMHG

## 2017-08-06 LAB
GLUCOSE BLDC GLUCOMTR-MCNC: 478 MG/DL (ref 70–99)
INTERPRETATION ECG - MUSE: NORMAL
TROPONIN I SERPL-MCNC: 0.02 UG/L (ref 0–0.04)

## 2017-08-06 PROCEDURE — 96372 THER/PROPH/DIAG INJ SC/IM: CPT

## 2017-08-06 PROCEDURE — 00000146 ZZHCL STATISTIC GLUCOSE BY METER IP

## 2017-08-06 PROCEDURE — 25000131 ZZH RX MED GY IP 250 OP 636 PS 637: Performed by: EMERGENCY MEDICINE

## 2017-08-06 RX ADMIN — HUMAN INSULIN 12 UNITS: 100 INJECTION, SOLUTION SUBCUTANEOUS at 01:05

## 2017-08-06 ASSESSMENT — ENCOUNTER SYMPTOMS
ABDOMINAL PAIN: 1
FEVER: 1
BACK PAIN: 1
SHORTNESS OF BREATH: 1
CHILLS: 1
VOMITING: 0

## 2017-08-06 NOTE — ED PROVIDER NOTES
"  History     Chief Complaint:  Back Pain    HPI   Donald Raza is a 67 year old male with a history of NSTEMI x 2 who presents to the ED for evaluation of back and chest pain. The patient reports that he had a simultaneous onset of back and chest pain around [id number] today and notes he was doing dialysis at the time. He describes his pain as similar to his previous episodes of pain and reports his chest pain is right to midsternal and sharp, \"like someone stabbed [him] with a knife.\" He states his back pain migrates from the lumbar to the thoracic and cervical spine before returning to the low back. The pain is also to the paraspinal musculature bilateral. His pain has been intermittent but ongoing, lasting approximately for an hour per episode. He reports to taking one nitro approximately an hour prior to evaluation without significant change in pain. Patient additionally endorses stomach discomfort, slight shortness of breath, and subjective fever and chills. Denies any vomiting or syncope. Denies any personal history of PE/DVT.    PE/DVT Risk Factors  History of smoking - Former smoker  Personal history of PE/DVT - No  Recent travel - No  Recent surgery - No  Other immobilizations - No  Cancer - No    Allergies:  Diagnostic x-ray materials - PN: renal failure  Lisinopril   Sulfa drugs     Medications:    Lantus  Norvasc  Hydralazine  Losartan  Albuterol  Robitussin   Nepro  Prilosec  B complex-C-folic acid   Insulin lispro  Multiple vitamins-minerals   Mycophenolate  Imdur  Losartan  Gabapentin  Nystatin   Aldara  Doxercalciferol  Clonazepam  Clopidogrel bisulfate  Ziagen  Phoslo  Chlorhexidine  Hydrocortisone  Lamisil  Lipitor  Epoetin Dwayne  Venofer  Magnesium oxide  Tivicay   Nitroglycerin   Dapsone  Prezista  Ritonavir     Past Medical History:    Allergic rhinitis   Bilateral pneumonia - 1/7/2017  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  Bradycardia - 5/28/2016  CAD s/p percutaneous coronary angioplasty - " /15/2015  Chest pain   Chronic kidney disease  Coronary artery disease  Dilated aortic root - 5/16/2016  End stage renal disease - 5/16/2016  Human immunodeficiency virus disease  Hypertension - 2010  Hypotension - 5/22/2016  Impotence of organic origin   Increased prostate specific antigen (PSA) velocity   Mixed hyperlipidemia   Near syncope - 2016  NSTEMI - 12/2015, 5/2016  Pulmonary hypertension   Transient ischemic attack - 5/2016  Type II diabetes mellitus - age 50  Unstable angina - 3/4/2016    Past Surgical History:    Angiogram - no culprit lesions, stents widely patent - 3/4/2016  Angiogram - 5/6/2016  Appendectomy - 2000  Cholecystectomy, laparoscopic   Colostomy (temporary) - 9/30/1999  Heart cath, angioplasty - 8/18/2016  Coronary stent - 6/2015, 12/2015    Family History:    Brother - heart disease (CABG), dilated aorta  Sister - kidney disease, hypertension     Social History:  The patient presented to the ED alone.  Smoking Status: Former smoker (cigarettes).  Alcohol Use: No  Marital Status:       Review of Systems   Constitutional: Positive for chills (subjective) and fever (subjective).   Respiratory: Positive for shortness of breath.    Cardiovascular: Positive for chest pain.   Gastrointestinal: Positive for abdominal pain. Negative for vomiting.   Musculoskeletal: Positive for back pain.   Neurological: Negative for syncope.   All other systems reviewed and are negative.    Physical Exam   First Vitals:  BP: 188/88  Pulse: 86  Temp: 97.6  F (36.4  C)  Resp: 20  Weight: 90 kg (198 lb 6.6 oz)  SpO2: 96 %      Physical Exam    General:   Pleasant, age appropriate.  HEENT:    Oropharynx is moist, without lesions or trismus.  Eyes:    Conjunctiva normal  Neck:    Supple, no meningismus.     CV:     Regular rate and rhythm.      Grade 3/6 KELSEA. No rubs or gallops.       No JVD or unilateral leg swelling.       2+ radial pulses bilateral.       LUE fistula with thrill     No lower extremity  edema.  PULM:    Clear to auscultation bilateral.       No respiratory distress.      Good air exchange.     No rales or wheezing.     No stridor.  ABD:    Soft, non-tender, non-distended.       No pulsatile masses.       No rebound, guarding or rigidity.  MSK:     No gross deformity to all four extremities.      No focal bony tenderness to the thoracic/lumbar spine or paraspinal musculature  LYMPH:   No cervical lymphadenopathy.  NEURO:   Alert. Good muscle tone, no atrophy.     Strength 5/5 and sensation intact to upper and lower extremities.  Skin:    Warm, dry and intact.    Psych:    Mood is good and affect is appropriate.        Emergency Department Course   ECG done at 22:57. ECG read at 23:06:  Rate 87 bpm. CT interval 166. QRS duration 92. QT/QTc 370/445. P-R-T axes 52 29 50.  Normal sinus rhythm. Normal ECG. No significant change compared to EKG dated 6/7/2017.    Imaging:  Radiographic findings were communicated with the patient who voiced understanding of the findings.  Chest XR, PA & LAT:  No acute abnormality.   Per radiology.     Laboratory:  (0244) BMP:  low, chloride 92 low, glucose 471 high, BUN 39 high, creatinine 5.55 high, GFR estimate 10 low, o/w WNL   (0104) Glucose by meter: 478 high   CBC: HGB 9.4 low,  low, o/w WNL (WBC 4.2)   Troponin I: 0.022    Interventions:  (0589) Norco 2 tables PO  (0105) Insulin regular 12 units subcutaneous    Emergency Department Course:  Nursing notes and vitals reviewed.  I performed an exam of the patient as documented above.     Blood drawn. This was sent to the lab for further testing, results above.  The patient was sent for a chest XR while here in the emergency department, findings above.     The patient reported good relief after the above interventions.    (0042) I rechecked the patient at this time.    Findings and plan explained to the Patient. Patient discharged home with instructions regarding supportive care, medications, and reasons to  return. The importance of close follow-up was reviewed.     Impression & Plan      Medical Decision Making:  Donald Raza is a 67 year old male with a history of recurrent chest and back pain along with coronary artery disease presents to the ED under similar circumstances of right-sided chest pain and diffuse bilateral thoracic and lumbar back pain. On examination he appears well. He was evaluated for ACS in which EKG shows no ischemic changes. Troponin is within normal limits despite greater than 8 hours of constant pain, ruling out MI. He did have a recent stress test in June, which was unremarkable for developing coronary artery disease. He has no risk factors or findings concerning for pulmonary embolus, aortic dissection, aortic aneurysm. The cause of his symptoms are a bit uncertain at this time, but do not appear to be related to an emergent etiology. He was incidentally noted to have hyperglycemia in the absence of DKA. He was given subcutaneous insulin. It appears this is due to the patient not taking his insulin on the morning of dialysis as he is worried he will become hypoglycemic on dialysis days. I recommended he follow up with his endocrinologist for further discussion of his insulin regimen.     Diagnosis:    ICD-10-CM   1. Chest pain, unspecified type R07.9   2. Right-sided thoracic back pain, unspecified chronicity M54.6   3. Hyperglycemia R73.9     Disposition:  Discharged to home.    I, Adrienne Adams, am serving as a scribe at 11:09 PM on 8/5/2017 to document services personally performed by Al Seaman MD based on my observations and the provider's statements to me.   [facility] EMERGENCY DEPARTMENT       Al Saeman MD  08/06/17 0320

## 2017-08-06 NOTE — DISCHARGE INSTRUCTIONS
Discharge Instructions  Chest Pain    You have been seen today for chest pain or discomfort.  At this time, your doctor has found no signs that your chest pain is due to a serious or life-threatening condition, (or you have declined more testing and/or admission to the hospital). However, sometimes there is a serious problem that does not show up right away. Your evaluation today may not be complete and you may need further testing and evaluation.     You need to follow-up with your regular doctor within 3 days.    Return to the Emergency Department if:    Your chest pain changes, gets worse, starts to happen more often, or comes with less activity.    You are short of breath.    You get very weak or tired.    You pass out or faint.    You have any new symptoms, like fever, cough, numb legs, or you cough up blood.    You have anything else that worries you.    Until you follow-up with your regular doctor please do the following:    Take one aspirin daily unless you have an allergy or are told not to by your doctor.    If a stress test appointment has been made, go to the appointment.    If you have questions, contact your regular doctor.    If your doctor today has told you to follow-up with your regular doctor, it is very important that you make an appointment with your clinic and go to the appointment.  If you do not follow-up with your primary doctor, it may result in missing an important development which could result in permanent injury or disability and/or lasting pain.  If there is any problem keeping your appointment, call your doctor or return to the Emergency Department.    If you were given a prescription for medicine here today, be sure to read all of the information (including the package insert) that comes with your prescription.  This will include important information about the medicine, its side effects, and any warnings that you need to know about.  The pharmacist who fills the prescription can  provide more information and answer questions you may have about the medicine.  If you have questions or concerns that the pharmacist cannot address, please call or return to the Emergency Department.       Discharge Instructions  Back Pain  You were seen today for back pain. Back pain can have many causes, but most will get better without surgery or other specific treatment. Sometimes there is a herniated ( slipped ) disc. We don t usually do MRI scans to look for these right away, since most herniated discs will get better on their own with time.  Today, we did not find any evidence that your back pain was caused by a serious condition, such as an infection, fracture, or tumor. However, sometimes symptoms develop over time and cannot be found during an emergency visit, so it is very important that you follow up with your primary doctor.  Return to the Emergency Department if:    You develop a fever with your back pain.     You have weakness or change in sensation in one or both legs.    You lose control of your bowels or bladder, or can t empty your bladder.    Your pain gets much worse.     Follow-up with your doctor:    Unless your pain has completely gone away, please make an appointment with your doctor within one week.  You may need further management of your back pain, such as more pain medication, imaging such as an X-ray or MRI, or physical therapy.    What can I do to help myself?    Remain Active -- People are often afraid that they will hurt their back further or delay recovery by remaining active, but this is one of the best things you can do for your back. In fact, prolonged bed rest is not recommended. Studies have shown that people with low back pain recover faster when they remain active. Movement helps to bring blood flow to the muscles and relieve muscle spasms as well as preventing loss of muscle strength.    Heat -- Using a heating pad can help with low back pain during the first few weeks. Do  not sleep with a heating pad, as you can be burned.     Pain medications - You may take a pain medication such as Tylenol  (acetaminophen), Advil , Nuprin  (ibuprofen) or Aleve  (naproxen).  If you have been given a narcotic such as Vicodin  (hydrocodone with acetaminophen), Percocet  (oxycodone with acetaminophen), codeine, or a muscle relaxant such as Flexeril  (cyclobenzaprine) or Soma  (carisoprodol), do not drive for four hours after you have taken it. If the narcotic contains Tylenol  (acetaminophen), do not take Tylenol  with it. All narcotics will cause constipation, so eat a high fiber diet.   If you were given a prescription for medicine here today, be sure to read all of the information (including the package insert) that comes with your prescription.  This will include important information about the medicine, its side effects, and any warnings that you need to know about.  The pharmacist who fills the prescription can provide more information and answer questions you may have about the medicine.  If you have questions or concerns that the pharmacist cannot address, please call or return to the Emergency Department.   Discharge Instructions  Hyperglycemia, High Blood Sugar    Today we found your blood sugar (glucose) was high. This may mean that you have developed diabetes, or if you already know that you have diabetes, it may mean that your diabetes is not as well controlled as it should be. Signs of elevated blood sugar include increased thirst, frequent urination, blurred vision, fatigue, unexplained weight loss, poor wound healing, and frequent infections.     We sometimes give medicine in the Emergency Department to lower the blood sugar. We may also prescribe medicine for you to use at home, or increase the medicine that you already take. While we don t like to see your blood sugar high, it is much more dangerous to let your blood sugar get too low, so it is reasonable to take time to bring it  down, or to wait and watch to see if it comes down on its own.     It is very important that you go to see your regular doctor to have additional tests to see what the high blood sugar test means in your case. See your doctor as directed today, or within 1 week.    Return to the Emergency Department if you develop:    Nausea and vomiting.    Confusion, disorientation, or being unable to wake up.    Fever greater than 101.5.    Blood sugar greater than 400.    Abdominal pain.    What can I do to help myself?    Check your blood sugar as instructed by your doctor.    Take medications prescribed by your doctor.    Follow a diabetic diet (low fat, low concentrated sweets, high fiber).    Exercise regularly.    Moderate or eliminate alcohol use.    Stop smoking.    Diabetes mellitus is a disease in which the body cannot regulate the amount of sugar (glucose) in the blood. Insulin allows glucose to move out of the blood into cells throughout the body where it is used for fuel. People with diabetes do not produce enough insulin (type 1 diabetes), or cannot use insulin properly (type 2 diabetes), or both. This starves the cells that need the glucose for fuel, and also harms certain organs and tissues exposed to the high glucose levels.  Over a long period of time, uncontrolled diabetes can lead to heart and blood vessel disease, blindness, kidney failure, foot ulcers and many other problems.    About 17 million Americans (6.2% of adults) have diabetes. We think that about one third of adults with diabetes do not know they have diabetes.  The incidence of diabetes is increasing rapidly. This increase is due to many factors, but the most significant are our increasing weight and decreased activity levels.     Diabetes can be a very serious and life-threatening illness if not treated.  If you were given a prescription for medicine here today, be sure to read all of the information (including the package insert) that comes with  your prescription.  This will include important information about the medicine, its side effects, and any warnings that you need to know about.  The pharmacist who fills the prescription can provide more information and answer questions you may have about the medicine.  If you have questions or concerns that the pharmacist cannot address, please call or return to the Emergency Department.

## 2017-08-06 NOTE — ED NOTES
Back pain x 2 days. Pain is similar to when he had his heart attack. NO known injury. ABC intact alert and no distress.

## 2017-08-09 ENCOUNTER — HOSPITAL ENCOUNTER (OUTPATIENT)
Dept: CARDIOLOGY | Facility: CLINIC | Age: 67
Discharge: HOME | End: 2017-08-09
Attending: INTERNAL MEDICINE | Admitting: INTERNAL MEDICINE
Payer: MEDICARE

## 2017-08-09 DIAGNOSIS — I25.10 CORONARY ARTERY DISEASE INVOLVING NATIVE CORONARY ARTERY OF NATIVE HEART WITHOUT ANGINA PECTORIS: ICD-10-CM

## 2017-08-09 LAB
ALT SERPL W P-5'-P-CCNC: 22 U/L (ref 0–70)
CHOLEST SERPL-MCNC: 125 MG/DL
HDLC SERPL-MCNC: 33 MG/DL
LDLC SERPL CALC-MCNC: 30 MG/DL
NONHDLC SERPL-MCNC: 92 MG/DL
TRIGL SERPL-MCNC: 311 MG/DL

## 2017-08-09 PROCEDURE — 80061 LIPID PANEL: CPT | Performed by: INTERNAL MEDICINE

## 2017-08-09 PROCEDURE — 93306 TTE W/DOPPLER COMPLETE: CPT

## 2017-08-09 PROCEDURE — 84460 ALANINE AMINO (ALT) (SGPT): CPT | Performed by: INTERNAL MEDICINE

## 2017-08-09 PROCEDURE — 36415 COLL VENOUS BLD VENIPUNCTURE: CPT | Performed by: INTERNAL MEDICINE

## 2017-08-09 PROCEDURE — 93306 TTE W/DOPPLER COMPLETE: CPT | Mod: 26 | Performed by: INTERNAL MEDICINE

## 2017-08-11 ENCOUNTER — OFFICE VISIT (OUTPATIENT)
Dept: CARDIOLOGY | Facility: CLINIC | Age: 67
End: 2017-08-11
Attending: INTERNAL MEDICINE
Payer: MEDICARE

## 2017-08-11 VITALS
WEIGHT: 198 LBS | HEIGHT: 71 IN | BODY MASS INDEX: 27.72 KG/M2 | OXYGEN SATURATION: 91 % | SYSTOLIC BLOOD PRESSURE: 142 MMHG | DIASTOLIC BLOOD PRESSURE: 72 MMHG | HEART RATE: 65 BPM

## 2017-08-11 DIAGNOSIS — I15.1 HYPERTENSION SECONDARY TO OTHER RENAL DISORDERS: ICD-10-CM

## 2017-08-11 DIAGNOSIS — I25.10 CORONARY ARTERY DISEASE INVOLVING NATIVE CORONARY ARTERY OF NATIVE HEART WITHOUT ANGINA PECTORIS: ICD-10-CM

## 2017-08-11 DIAGNOSIS — I25.10 CORONARY ARTERY DISEASE INVOLVING NATIVE HEART, ANGINA PRESENCE UNSPECIFIED, UNSPECIFIED VESSEL OR LESION TYPE: ICD-10-CM

## 2017-08-11 PROCEDURE — 99214 OFFICE O/P EST MOD 30 MIN: CPT | Performed by: INTERNAL MEDICINE

## 2017-08-11 RX ORDER — ISOSORBIDE MONONITRATE 30 MG/1
90 TABLET, EXTENDED RELEASE ORAL EVERY EVENING
Qty: 30 TABLET | Refills: 0 | Status: SHIPPED | OUTPATIENT
Start: 2017-08-11 | End: 2017-08-11

## 2017-08-11 RX ORDER — ISOSORBIDE MONONITRATE 30 MG/1
90 TABLET, EXTENDED RELEASE ORAL EVERY EVENING
Qty: 90 TABLET | Refills: 0 | COMMUNITY
Start: 2017-08-11 | End: 2017-08-24

## 2017-08-11 NOTE — MR AVS SNAPSHOT
"              After Visit Summary   8/11/2017    Donald Raza    MRN: [id number]           Patient Information     Date Of Birth          1950        Visit Information        Provider Department      8/11/2017 10:45 AM Arnoldo Diaz MD [facility]        Today's Diagnoses     Coronary artery disease involving native coronary artery of native heart without angina pectoris        Coronary artery disease involving native heart, angina presence unspecified, unspecified vessel or lesion type        Hypertension secondary to other renal disorders           Follow-ups after your visit        Additional Services     Follow-Up with Cardiologist                 Future tests that were ordered for you today     Open Future Orders        Priority Expected Expires Ordered    Follow-Up with Cardiologist Routine 12/9/2017 8/11/2018 8/11/2017            Who to contact     If you have questions or need follow up information about today's clinic visit or your schedule please contact [facility] directly at [phone].  Normal or non-critical lab and imaging results will be communicated to you by MyChart, letter or phone within 4 business days after the clinic has received the results. If you do not hear from us within 7 days, please contact the clinic through Provigenthart or phone. If you have a critical or abnormal lab result, we will notify you by phone as soon as possible.  Submit refill requests through Zoove or call your pharmacy and they will forward the refill request to us. Please allow 3 business days for your refill to be completed.          Additional Information About Your Visit        Provigenthart Information     Zoove lets you send messages to your doctor, view your test results, renew your prescriptions, schedule appointments and more. To sign up, go to [email or web address] . Click on \"Log in\" on the left side of the screen, which " "will take you to the Welcome page. Then click on \"Sign up Now\" on the right side of the page.     You will be asked to enter the access code listed below, as well as some personal information. Please follow the directions to create your username and password.     Your access code is: [email or web address]  Expires: 2017  1:01 AM     Your access code will  in 90 days. If you need help or a new code, please call your [facility] clinic or [phone].        Care EveryWhere ID     This is your Care EveryWhere ID. This could be used by other organizations to access your [facility] medical records  [id number]        Your Vitals Were     Pulse Height Pulse Oximetry BMI (Body Mass Index)          65 1.803 m (5' 11\") 91% 27.62 kg/m2         Blood Pressure from Last 3 Encounters:   17 142/72   17 155/84   17 124/64    Weight from Last 3 Encounters:   17 89.8 kg (198 lb)   17 90 kg (198 lb 6.6 oz)   17 90.3 kg (199 lb)              We Performed the Following     Follow-Up with Cardiologist          Today's Medication Changes          These changes are accurate as of: 17 11:25 AM.  If you have any questions, ask your nurse or doctor.               These medicines have changed or have updated prescriptions.        Dose/Directions    isosorbide mononitrate 30 MG 24 hr tablet   Commonly known as:  IMDUR   This may have changed:  how much to take   Used for:  Coronary artery disease involving native heart, angina presence unspecified, unspecified vessel or lesion type, Hypertension secondary to other renal disorders   Changed by:  Arnoldo Diaz MD        Dose:  90 mg   Take 3 tablets (90 mg) by mouth every evening   Quantity:  30 tablet   Refills:  0         Stop taking these medicines if you haven't already. Please contact your care team if you have questions.     calcium acetate 667 MG Caps capsule   Commonly known as:  PHOSLO   Stopped by:  Arnoldo Diaz MD                Where to " get your medicines      Some of these will need a paper prescription and others can be bought over the counter.  Ask your nurse if you have questions.     Bring a paper prescription for each of these medications     isosorbide mononitrate 30 MG 24 hr tablet                Primary Care Provider Office Phone # Fax #    Aida Thomas [phone]       [facility] [address] [facility] [address]        Equal Access to Services     JOON KERN : Hadii aad ku hadasho Soomaali, waaxda luqadaha, qaybta kaalmada adeegyada, waxay idiin hayaan adeeg kharash lamanny . So [facility] [phone].    ATENCIÓN: Si lucia espcon, tiene a ayala disposición servicios gratuitos de asistencia lingüística. [facility] [phone].    We comply with applicable federal civil rights laws and [place] laws. We do not discriminate on the basis of race, color, national origin, age, disability sex, sexual orientation or gender identity.            Thank you!     Thank you for choosing [facility] [place]  for your care. Our goal is always to provide you with excellent care. Hearing back from our patients is one way we can continue to improve our services. Please take a few minutes to complete the written survey that you may receive in the mail after your visit with us. Thank you!             Your Updated Medication List - Protect others around you: Learn how to safely use, store and throw away your medicines at www.disposemymeds.org.          This list is accurate as of: 8/11/17 11:25 AM.  Always use your most recent med list.                   Brand Name Dispense Instructions for use Diagnosis    abacavir 300 MG tablet    ZIAGEN     Take 600 mg by mouth every evening        albuterol 108 (90 BASE) MCG/ACT Inhaler    PROAIR HFA/PROVENTIL HFA/VENTOLIN HFA    1 Inhaler    Inhale 2 puffs into the lungs every 6 hours as needed for shortness of breath / dyspnea or wheezing    Cough        * amLODIPine 5 MG tablet    NORVASC     Take 5 mg by mouth daily as needed (only uses when blood pressure greater than 150-160)        * amLODIPine 2.5 MG tablet    NORVASC    30 tablet    Take 1 tablet (2.5 mg) by mouth daily    Hypertension secondary to other renal disorders       atorvastatin 40 MG tablet    LIPITOR     Take 40 mg by mouth At Bedtime        B COMPLEX-C-FOLIC ACID PO           chlorhexidine 0.12 % solution    PERIDEX     Rinse and gargle 15 ml by mouth twice a day as directed.        CLONAZEPAM PO      Take 0.5 mg by mouth nightly as needed for anxiety (restless legs)        CLOPIDOGREL BISULFATE PO      Take 75 mg by mouth every evening        dapsone 100 MG tablet      Take 100 mg by mouth At Bedtime        dolutegravir 50 MG tablet    TIVICAY     Take 50 mg by mouth At Bedtime        DOXERCALCIFEROL IV      Inject 6 mcg into the vein three times a week (with dialysis)        epoetin brittni 19905 UNIT/ML injection    EPOGEN/PROCRIT     Inject 11,000 Units Subcutaneous three times a week WITH DIALYSIS        * gabapentin 300 MG capsule    NEURONTIN     Take 300 mg by mouth every morning        * gabapentin 300 MG capsule    NEURONTIN     Take 600 mg by mouth every evening        HYDRALAZINE HCL PO      Take 25 mg by mouth 2 times daily as needed for high blood pressure        imiquimod 5 % cream    ALDARA     Apply topically as needed        insulin glargine 100 UNIT/ML injection    LANTUS     Inject 36 Units Subcutaneous every morning    Type 2 diabetes mellitus with chronic kidney disease on chronic dialysis, unspecified long term insulin use status (H)       insulin lispro 100 UNIT/ML injection    HumaLOG KWIKpen     Inject 5 Units Subcutaneous 3 times daily (before meals)    Type 2 diabetes mellitus with chronic kidney disease on chronic dialysis, unspecified long term insulin use status (H)       iron sucrose 20 MG/ML injection    VENOFER     Inject 50 mg into the vein once a week WIth  "dialysis        isosorbide mononitrate 30 MG 24 hr tablet    IMDUR    30 tablet    Take 3 tablets (90 mg) by mouth every evening    Coronary artery disease involving native heart, angina presence unspecified, unspecified vessel or lesion type, Hypertension secondary to other renal disorders       lamISIL AT 1 % cream   Generic drug:  terbinafine      Apply topically 2 times daily as needed        losartan 100 MG tablet    COZAAR    30 tablet    Take 1 tablet (100 mg) by mouth At Bedtime    Hypertension secondary to other renal disorders       MAGNESIUM OXIDE PO      Take 400 mg by mouth At Bedtime        mycophenolate 500 MG tablet    GENERIC EQUIVALENT     Take 500 mg by mouth 2 times daily On an empty stomach        NEPRO PO      1-3 times daily        nitroGLYcerin 0.4 MG sublingual tablet    NITROSTAT    25 tablet    One tablet under the tongue every 5 minutes if needed for chest pain. May repeat every 5 minutes for a maximum of 3 doses in 15 minutes\"    Coronary artery disease due to lipid rich plaque, Status post coronary angioplasty       order for DME     1 each    Equipment being ordered: Other: 4WW Treatment Diagnosis: Decreased activity tolerance    SOB (shortness of breath), Generalized muscle weakness       PREZISTA PO      Take 800 mg by mouth At Bedtime.        priLOSEC 40 MG capsule   Generic drug:  omeprazole      Take 40 mg by mouth daily 1 hour before dinner        ritonavir 100 MG capsule    NORVIR     Take 1 capsule by mouth At Bedtime        * Notice:  This list has 4 medication(s) that are the same as other medications prescribed for you. Read the directions carefully, and ask your doctor or other care provider to review them with you.      "

## 2017-08-11 NOTE — PROGRESS NOTES
HPI and Plan:   Mr. Raza is a very pleasant 67-year-old gentleman originally from [place] with history of CAD with history of several non-STEMI is an PCI's. He has other comorbidities of  end-stage renal disease on hemodialysis, HIV, hypertension, dyslipidemia with significant hypertriglyceridemia, diabetes. In June 2015 he had mid LAD PCI.  At that time, he had an inferior branch diagonal with obstructive disease which was medically managed and subsequently had another non-STEMI in 12/2015, and was found to have tight ostial diagonal stenosis that was treated with a drug-eluting stent PCI and LAD was dilated with a balloon in kissing fashion.  Subsequently, in 05/2016, he had other non-STEMI and was found to have in-stent restenosis of the diagonal and he underwent cutting balloon angioplasty for the same.  Then, he had a non-STEMI back in 08/2016, and was found to have diagonal in-stent restenosis, dilated and then stented with 3 x 8 Xience Alpine stent.  He then was admitted several times since then, the first in early January with non-STEMI and repeat coronary angiogram showed patent stents.  He then was admitted in late January with nausea, vomiting, diarrhea and in March with fever and influenza and then last month because of weakness and was found to have bradycardia and carvedilol was stopped.  He was discharged on an event monitor which he is still on and there was some CardioNet transmission from 04/30 that showed sinus rhythm with isolated PVCs In June 2015 he had mid LAD PCI.  At that time, he had an inferior branch diagonal with obstructive disease which was medically managed and subsequently had another non-STEMI in 12/2015, and was found to have tight ostial diagonal stenosis that was treated with a drug-eluting stent PCI and LAD was dilated with a balloon in kissing fashion.  Subsequently, in 05/2016, he had other non-STEMI and was found to have in-stent restenosis of the diagonal and he underwent  cutting balloon angioplasty for the same.  Then, he had a non-STEMI back in 08/2016, and was found to have diagonal in-stent restenosis, dilated and then stented with 3 x 8 Xience Alpine stent.  He then was admitted several times since then, the first in early January 2017 with non-STEMI and repeat coronary angiogram showed patent stents.  He then was admitted in late January 2017 with nausea, vomiting, diarrhea and in March with fever and influenza and then last month because of weakness and was found to have bradycardia and carvedilol was stopped.  He was discharged on an event monitor which he is still on and there was some CardioNet transmission from 04/30 that showed sinus rhythm with isolated PVCs. To be noted he has labile hypertension and at times had hypotension during dialysis and in addition to baseline regimen of antihypertensive medication he also use amlodipine on an as-needed basis. Patient was seen in June 2017 and he was complaining of some back and chest discomfort, he underwent a Lexiscan stress test that did not reveal any ischemia there was evidence of inferior wall recognition artifact. Patient tells me that he and Mr. struggle with some intermittent back and chest discomfort at times it is responded to sublingual nitroglycerin at times it would go away on its own. He was recently seen in the ER for similar discomfort and was ruled out for acute conus syndrome without any EKG changes and negative troponin. Patient tells me overall he is quite satisfied his quality of life. He does struggle with fatigue is patient lay on the day when he get dialysis. He is fairly active and does yard work he gets tired with physical activity but does not experience any chest discomfort or back pain with physical activity. In the past he had significantly elevated triglycerides almost 1300. Lipid panel done 2 days ago showed triglycerides improved to 311, LDL well controlled at 30. He is on dual antiplatelet  therapy which he is tolerating quite well. Additionally he is on Imdur 60 mA q. daily, losartan 100 mg q. daily, atorvastatin 40 m q. Daily. He uses amlodipine on as-needed basis. He had an echocardiogram done recently that showed normal LVEF, mildly dilated LV, mildly dilated ascending aorta and aortic root.    Assessment and plan  A very delightful 67-year-old gentleman with CAD with several non-STEMI and PCI's as noted above, and end-stage renal disease on hemodialysis, hypertension, dyslipidemia, normal LVEF. He has intermittent back and chest discomfort that overall sounds atypical but there may be some element of coronary artery wheezes spasm involved as he gets some relief with sublingual nitroglycerin. Recent stress test did not reveal any ischemia. At this time I recommend increasing Imdur to 90 mg q. daily. He should continue dual  therapy, high intensity statin, angiotensin receptor blocker. He is not on beta blocker due to history of bradycardia. LDL is well controlled, blood pressure is reasonably controlled. At this time I recommend follow-up in about 3-4 months time, sooner if any changes in clinical status.    Orders Placed This Encounter   Procedures     Follow-Up with Cardiologist       Orders Placed This Encounter   Medications     B COMPLEX-C-FOLIC ACID PO     isosorbide mononitrate (IMDUR) 30 MG 24 hr tablet     Sig: Take 3 tablets (90 mg) by mouth every evening     Dispense:  30 tablet     Refill:  0       Medications Discontinued During This Encounter   Medication Reason     B complex-C-folic acid (NEPHROCAPS/TRIPHROCAPS) 1 MG capsule Therapy completed     calcium acetate (PHOSLO) 667 MG CAPS Side effects     guaiFENesin-dextromethorphan (ROBITUSSIN DM) 100-10 MG/5ML syrup Therapy completed     hydrocortisone (ANUCORT-HC) 25 MG suppository Therapy completed     insulin lispro (HUMALOG KWIKPEN) 100 UNIT/ML injection Medication Reconciliation Clean Up     LOSARTAN POTASSIUM PO  Medication Reconciliation Clean Up     Multiple Vitamins-Minerals (DIALYVITE 800/ULTRA D) TABS Therapy completed     nystatin (MYCOSTATIN) cream Medication Reconciliation Clean Up     isosorbide mononitrate (IMDUR) 30 MG 24 hr tablet Reorder         Encounter Diagnoses   Name Primary?     Coronary artery disease involving native coronary artery of native heart without angina pectoris      Coronary artery disease involving native heart, angina presence unspecified, unspecified vessel or lesion type      Hypertension secondary to other renal disorders        CURRENT MEDICATIONS:  Current Outpatient Prescriptions   Medication Sig Dispense Refill     B COMPLEX-C-FOLIC ACID PO        isosorbide mononitrate (IMDUR) 30 MG 24 hr tablet Take 3 tablets (90 mg) by mouth every evening 30 tablet 0     insulin glargine (LANTUS) 100 UNIT/ML injection Inject 36 Units Subcutaneous every morning       amLODIPine (NORVASC) 2.5 MG tablet Take 1 tablet (2.5 mg) by mouth daily 30 tablet 0     amLODIPine (NORVASC) 5 MG tablet Take 5 mg by mouth daily as needed (only uses when blood pressure greater than 150-160)       HYDRALAZINE HCL PO Take 25 mg by mouth 2 times daily as needed for high blood pressure       albuterol (PROAIR HFA/PROVENTIL HFA/VENTOLIN HFA) 108 (90 BASE) MCG/ACT Inhaler Inhale 2 puffs into the lungs every 6 hours as needed for shortness of breath / dyspnea or wheezing 1 Inhaler 1     Nutritional Supplements (NEPRO PO) 1-3 times daily       omeprazole (PRILOSEC) 40 MG capsule Take 40 mg by mouth daily 1 hour before dinner       insulin lispro (HUMALOG KWIKPEN) 100 UNIT/ML injection Inject 5 Units Subcutaneous 3 times daily (before meals)       order for DME Equipment being ordered: Other: 4WW  Treatment Diagnosis: Decreased activity tolerance 1 each 0     mycophenolate (CELLCEPT - GENERIC EQUIVALENT) 500 MG tablet Take 500 mg by mouth 2 times daily On an empty stomach       losartan (COZAAR) 100 MG tablet Take 1 tablet  "(100 mg) by mouth At Bedtime 30 tablet 0     gabapentin (NEURONTIN) 300 MG capsule Take 600 mg by mouth every evening       imiquimod (ALDARA) 5 % cream Apply topically as needed       DOXERCALCIFEROL IV Inject 6 mcg into the vein three times a week (with dialysis)       CLONAZEPAM PO Take 0.5 mg by mouth nightly as needed for anxiety (restless legs)       CLOPIDOGREL BISULFATE PO Take 75 mg by mouth every evening        abacavir (ZIAGEN) 300 MG tablet Take 600 mg by mouth every evening        chlorhexidine (CHLORHEXIDINE) 0.12 % solution Rinse and gargle 15 ml by mouth twice a day as directed.       terbinafine (LAMISIL AT) 1 % cream Apply topically 2 times daily as needed        atorvastatin (LIPITOR) 40 MG tablet Take 40 mg by mouth At Bedtime       epoetin brittni (EPOGEN,PROCRIT) 98106 UNIT/ML injection Inject 11,000 Units Subcutaneous three times a week WITH DIALYSIS       iron sucrose (VENOFER) 20 MG/ML injection Inject 50 mg into the vein once a week WIth dialysis       MAGNESIUM OXIDE PO Take 400 mg by mouth At Bedtime       dolutegravir (TIVICAY) 50 MG tablet Take 50 mg by mouth At Bedtime       nitroglycerin (NITROSTAT) 0.4 MG SL tablet One tablet under the tongue every 5 minutes if needed for chest pain. May repeat every 5 minutes for a maximum of 3 doses in 15 minutes\" 25 tablet 3     gabapentin (NEURONTIN) 300 MG capsule Take 300 mg by mouth every morning        dapsone 100 MG tablet Take 100 mg by mouth At Bedtime        Darunavir Ethanolate (PREZISTA PO) Take 800 mg by mouth At Bedtime.       ritonavir (NORVIR) 100 MG capsule Take 1 capsule by mouth At Bedtime        [DISCONTINUED] insulin lispro (HUMALOG KWIKPEN) 100 UNIT/ML injection Inject Subcutaneous 3 times daily (before meals) Sliding scale - 2 units per 50 over 150       [DISCONTINUED] isosorbide mononitrate (IMDUR) 30 MG 24 hr tablet Take 2 tablets (60 mg) by mouth every evening 30 tablet 0       ALLERGIES     Allergies   Allergen Reactions     " Calcium Acetate Other (See Comments)     Pain in chest area (sensitive skin)      Diagnostic X-Ray Materials Other (See Comments)     PN: renal failure     Lisinopril      Sulfa Drugs        PAST MEDICAL HISTORY:  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, recurrent     Bradycardia 5/28/2016     CAD S/P percutaneous coronary angioplasty 6/15/2015     Chest pain      CKD (chronic kidney disease)     Hemodialysis     Dilated aortic root (H) 5/6/2016     ESRD (end stage renal disease) on dialysis (H) 5/6/2016     Hemodialysis-associated hypotension 5/22/2016     Human immunodeficiency virus (HIV) disease (H)      Hypertension 2010     Hypotension, unspecified hypotension type 5/22/2016     Impotence of organic origin      Increased prostate specific antigen (PSA) velocity 08/08/2016    Awaiting bx on blood thinner     Mixed hyperlipidemia      Near syncope 2016    with hemodialysis     NSTEMI (non-ST elevated myocardial infarction) (H) 12/2015, 5/2016     Pulmonary HTN (H)     Mod     TIA (transient ischemic attack) 5/2016     Type 2 diabetes mellitus (H) age 50     Unstable angina (H) 3/4/2016       PAST SURGICAL HISTORY:  Past Surgical History:   Procedure Laterality Date     ANGIOGRAM  03-04-16    No culprit lesions, stents widely patent      ANGIOGRAM  05-06-16    Cutting balloon ptca=Diag     APPENDECTOMY  2000     CHOLECYSTECTOMY, LAPOROSCOPIC       COLOSTOMY  09/30/1999    Temporary for diverticulitis     HEART CATH, ANGIOPLASTY  08-18-16    LAD PCI. Stented with a 3.0 x 8 mm Xience Alpine stent.     STENT, CORONARY, S660 15/18  12/2015    VANITA=Diag, PTCA=LAD     STENT, CORONARY, S660 15/18  06/2015    VANITA=LAD       FAMILY HISTORY:  Family History   Problem Relation Age of Onset     HEART DISEASE Brother 40     CABG     KIDNEY DISEASE Sister       "Hypertension Sister      HEART DISEASE Brother      Dilated aorta       SOCIAL HISTORY:  Social History     Social History     Marital status:      Spouse name: N/A     Number of children: N/A     Years of education: N/A     Social History Main Topics     Smoking status: Former Smoker     Types: Cigarettes     Smokeless tobacco: Never Used     Alcohol use No     Drug use: No     Sexual activity: Not Asked     Other Topics Concern     Parent/Sibling W/ Cabg, Mi Or Angioplasty Before 65f 55m? Yes     Caffeine Concern Yes     2 cups daily     Sleep Concern Yes     Special Diet No      more proteins     Exercise Yes     Cardiac rehab      Social History Narrative       Review of Systems:  Skin:  Positive for itching     Eyes:  Positive for glasses    ENT:  Negative      Respiratory:  Negative       Cardiovascular:    lightheadedness;Positive for;palpitations;chest pain;edema everyday when waking up is lightheaded/dizzy specially after dialysis  Gastroenterology: Positive for reflux occas heartburn   Genitourinary:  Positive for urgency;decreased urinary stream sometimes it hurts   Musculoskeletal:  Positive for back pain lower back pain - sharp lately uses Nitro and pain goes away   Neurologic:  Positive for numbness or tingling of feet;headaches    Psychiatric:  Negative      Heme/Lymph/Imm:  Negative      Endocrine:  Positive for diabetes      Physical Exam:  Vitals: /72 (BP Location: Right arm, Patient Position: Sitting, Cuff Size: Adult Regular)  Pulse 65  Ht 1.803 m (5' 11\")  Wt 89.8 kg (198 lb)  SpO2 91%  BMI 27.62 kg/m2    General- appears comfortable  Neck- normal JVP, no bruit  Cardiovascular system- s1s2 normal, no m/r/g  Respiratory system- CTA b/l  Abdomen- soft, non tender  Extremities- no pedal edema  Neurological - alert, oriented  Psych- normal affect  HEENT- no pallor          CAILIN Diaz MD  [address]  [place] [address]              "

## 2017-08-11 NOTE — LETTER
8/11/2017    Cristina Ann Baker, MD [facility]   [address] [place] [address]    RE: Donald Guevaraido       Dear Colleague,    I had the pleasure of seeing Donald Raza in the [facility].    HPI and Plan:   Mr. Raza is a very pleasant 67-year-old gentleman originally from [place] with history of CAD with history of several non-STEMI is an PCI's. He has other comorbidities of  end-stage renal disease on hemodialysis, HIV, hypertension, dyslipidemia with significant hypertriglyceridemia, diabetes. In June 2015 he had mid LAD PCI.  At that time, he had an inferior branch diagonal with obstructive disease which was medically managed and subsequently had another non-STEMI in 12/2015, and was found to have tight ostial diagonal stenosis that was treated with a drug-eluting stent PCI and LAD was dilated with a balloon in kissing fashion.  Subsequently, in 05/2016, he had other non-STEMI and was found to have in-stent restenosis of the diagonal and he underwent cutting balloon angioplasty for the same.  Then, he had a non-STEMI back in 08/2016, and was found to have diagonal in-stent restenosis, dilated and then stented with 3 x 8 Xience Alpine stent.  He then was admitted several times since then, the first in early January with non-STEMI and repeat coronary angiogram showed patent stents.  He then was admitted in late January with nausea, vomiting, diarrhea and in March with fever and influenza and then last month because of weakness and was found to have bradycardia and carvedilol was stopped.  He was discharged on an event monitor which he is still on and there was some CardioNet transmission from 04/30 that showed sinus rhythm with isolated PVCs In June 2015 he had mid LAD PCI.  At that time, he had an inferior branch diagonal with obstructive disease which was medically managed and subsequently had another non-STEMI in 12/2015, and was found to have tight ostial  diagonal stenosis that was treated with a drug-eluting stent PCI and LAD was dilated with a balloon in kissing fashion.  Subsequently, in 05/2016, he had other non-STEMI and was found to have in-stent restenosis of the diagonal and he underwent cutting balloon angioplasty for the same.  Then, he had a non-STEMI back in 08/2016, and was found to have diagonal in-stent restenosis, dilated and then stented with 3 x 8 Xience Alpine stent.  He then was admitted several times since then, the first in early January 2017 with non-STEMI and repeat coronary angiogram showed patent stents.  He then was admitted in late January 2017 with nausea, vomiting, diarrhea and in March with fever and influenza and then last month because of weakness and was found to have bradycardia and carvedilol was stopped.  He was discharged on an event monitor which he is still on and there was some CardioNet transmission from 04/30 that showed sinus rhythm with isolated PVCs. To be noted he has labile hypertension and at times had hypotension during dialysis and in addition to baseline regimen of antihypertensive medication he also use amlodipine on an as-needed basis. Patient was seen in June 2017 and he was complaining of some back and chest discomfort, he underwent a Lexiscan stress test that did not reveal any ischemia there was evidence of inferior wall recognition artifact. Patient tells me that he and Mr. struggle with some intermittent back and chest discomfort at times it is responded to sublingual nitroglycerin at times it would go away on its own. He was recently seen in the ER for similar discomfort and was ruled out for acute conus syndrome without any EKG changes and negative troponin. Patient tells me overall he is quite satisfied his quality of life. He does struggle with fatigue is patient lay on the day when he get dialysis. He is fairly active and does yard work he gets tired with physical activity but does not experience any  chest discomfort or back pain with physical activity. In the past he had significantly elevated triglycerides almost 1300. Lipid panel done 2 days ago showed triglycerides improved to 311, LDL well controlled at 30. He is on dual antiplatelet therapy which he is tolerating quite well. Additionally he is on Imdur 60 mA q. daily, losartan 100 mg q. daily, atorvastatin 40 m q. Daily. He uses amlodipine on as-needed basis. He had an echocardiogram done recently that showed normal LVEF, mildly dilated LV, mildly dilated ascending aorta and aortic root.    Assessment and plan  A very delightful 67-year-old gentleman with CAD with several non-STEMI and PCI's as noted above, and end-stage renal disease on hemodialysis, hypertension, dyslipidemia, normal LVEF. He has intermittent back and chest discomfort that overall sounds atypical but there may be some element of coronary artery wheezes spasm involved as he gets some relief with sublingual nitroglycerin. Recent stress test did not reveal any ischemia. At this time I recommend increasing Imdur to 90 mg q. daily. He should continue dual  therapy, high intensity statin, angiotensin receptor blocker. He is not on beta blocker due to history of bradycardia. LDL is well controlled, blood pressure is reasonably controlled. At this time I recommend follow-up in about 3-4 months time, sooner if any changes in clinical status.    Orders Placed This Encounter   Procedures     Follow-Up with Cardiologist       Orders Placed This Encounter   Medications     B COMPLEX-C-FOLIC ACID PO     isosorbide mononitrate (IMDUR) 30 MG 24 hr tablet     Sig: Take 3 tablets (90 mg) by mouth every evening     Dispense:  30 tablet     Refill:  0       Medications Discontinued During This Encounter   Medication Reason     B complex-C-folic acid (NEPHROCAPS/TRIPHROCAPS) 1 MG capsule Therapy completed     calcium acetate (PHOSLO) 667 MG CAPS Side effects     guaiFENesin-dextromethorphan  (ROBITUSSIN DM) 100-10 MG/5ML syrup Therapy completed     hydrocortisone (ANUCORT-HC) 25 MG suppository Therapy completed     insulin lispro (HUMALOG KWIKPEN) 100 UNIT/ML injection Medication Reconciliation Clean Up     LOSARTAN POTASSIUM PO Medication Reconciliation Clean Up     Multiple Vitamins-Minerals (DIALYVITE 800/ULTRA D) TABS Therapy completed     nystatin (MYCOSTATIN) cream Medication Reconciliation Clean Up     isosorbide mononitrate (IMDUR) 30 MG 24 hr tablet Reorder         Encounter Diagnoses   Name Primary?     Coronary artery disease involving native coronary artery of native heart without angina pectoris      Coronary artery disease involving native heart, angina presence unspecified, unspecified vessel or lesion type      Hypertension secondary to other renal disorders        CURRENT MEDICATIONS:  Current Outpatient Prescriptions   Medication Sig Dispense Refill     B COMPLEX-C-FOLIC ACID PO        isosorbide mononitrate (IMDUR) 30 MG 24 hr tablet Take 3 tablets (90 mg) by mouth every evening 30 tablet 0     insulin glargine (LANTUS) 100 UNIT/ML injection Inject 36 Units Subcutaneous every morning       amLODIPine (NORVASC) 2.5 MG tablet Take 1 tablet (2.5 mg) by mouth daily 30 tablet 0     amLODIPine (NORVASC) 5 MG tablet Take 5 mg by mouth daily as needed (only uses when blood pressure greater than 150-160)       HYDRALAZINE HCL PO Take 25 mg by mouth 2 times daily as needed for high blood pressure       albuterol (PROAIR HFA/PROVENTIL HFA/VENTOLIN HFA) 108 (90 BASE) MCG/ACT Inhaler Inhale 2 puffs into the lungs every 6 hours as needed for shortness of breath / dyspnea or wheezing 1 Inhaler 1     Nutritional Supplements (NEPRO PO) 1-3 times daily       omeprazole (PRILOSEC) 40 MG capsule Take 40 mg by mouth daily 1 hour before dinner       insulin lispro (HUMALOG KWIKPEN) 100 UNIT/ML injection Inject 5 Units Subcutaneous 3 times daily (before meals)       order for DME Equipment being ordered:  "Other: 4WW  Treatment Diagnosis: Decreased activity tolerance 1 each 0     mycophenolate (CELLCEPT - GENERIC EQUIVALENT) 500 MG tablet Take 500 mg by mouth 2 times daily On an empty stomach       losartan (COZAAR) 100 MG tablet Take 1 tablet (100 mg) by mouth At Bedtime 30 tablet 0     gabapentin (NEURONTIN) 300 MG capsule Take 600 mg by mouth every evening       imiquimod (ALDARA) 5 % cream Apply topically as needed       DOXERCALCIFEROL IV Inject 6 mcg into the vein three times a week (with dialysis)       CLONAZEPAM PO Take 0.5 mg by mouth nightly as needed for anxiety (restless legs)       CLOPIDOGREL BISULFATE PO Take 75 mg by mouth every evening        abacavir (ZIAGEN) 300 MG tablet Take 600 mg by mouth every evening        chlorhexidine (CHLORHEXIDINE) 0.12 % solution Rinse and gargle 15 ml by mouth twice a day as directed.       terbinafine (LAMISIL AT) 1 % cream Apply topically 2 times daily as needed        atorvastatin (LIPITOR) 40 MG tablet Take 40 mg by mouth At Bedtime       epoetin brittni (EPOGEN,PROCRIT) 78217 UNIT/ML injection Inject 11,000 Units Subcutaneous three times a week WITH DIALYSIS       iron sucrose (VENOFER) 20 MG/ML injection Inject 50 mg into the vein once a week WIth dialysis       MAGNESIUM OXIDE PO Take 400 mg by mouth At Bedtime       dolutegravir (TIVICAY) 50 MG tablet Take 50 mg by mouth At Bedtime       nitroglycerin (NITROSTAT) 0.4 MG SL tablet One tablet under the tongue every 5 minutes if needed for chest pain. May repeat every 5 minutes for a maximum of 3 doses in 15 minutes\" 25 tablet 3     gabapentin (NEURONTIN) 300 MG capsule Take 300 mg by mouth every morning        dapsone 100 MG tablet Take 100 mg by mouth At Bedtime        Darunavir Ethanolate (PREZISTA PO) Take 800 mg by mouth At Bedtime.       ritonavir (NORVIR) 100 MG capsule Take 1 capsule by mouth At Bedtime        [DISCONTINUED] insulin lispro (HUMALOG KWIKPEN) 100 UNIT/ML injection Inject Subcutaneous 3 times " daily (before meals) Sliding scale - 2 units per 50 over 150       [DISCONTINUED] isosorbide mononitrate (IMDUR) 30 MG 24 hr tablet Take 2 tablets (60 mg) by mouth every evening 30 tablet 0       ALLERGIES     Allergies   Allergen Reactions     Calcium Acetate Other (See Comments)     Pain in chest area (sensitive skin)      Diagnostic X-Ray Materials Other (See Comments)     PN: renal failure     Lisinopril      Sulfa Drugs        PAST MEDICAL HISTORY:  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, recurrent     Bradycardia 5/28/2016     CAD S/P percutaneous coronary angioplasty 6/15/2015     Chest pain      CKD (chronic kidney disease)     Hemodialysis     Dilated aortic root (H) 5/6/2016     ESRD (end stage renal disease) on dialysis (H) 5/6/2016     Hemodialysis-associated hypotension 5/22/2016     Human immunodeficiency virus (HIV) disease (H)      Hypertension 2010     Hypotension, unspecified hypotension type 5/22/2016     Impotence of organic origin      Increased prostate specific antigen (PSA) velocity 08/08/2016    Awaiting bx on blood thinner     Mixed hyperlipidemia      Near syncope 2016    with hemodialysis     NSTEMI (non-ST elevated myocardial infarction) (H) 12/2015, 5/2016     Pulmonary HTN (H)     Mod     TIA (transient ischemic attack) 5/2016     Type 2 diabetes mellitus (H) age 50     Unstable angina (H) 3/4/2016       PAST SURGICAL HISTORY:  Past Surgical History:   Procedure Laterality Date     ANGIOGRAM  03-04-16    No culprit lesions, stents widely patent      ANGIOGRAM  05-06-16    Cutting balloon ptca=Diag     APPENDECTOMY  2000     CHOLECYSTECTOMY, LAPOROSCOPIC       COLOSTOMY  09/30/1999    Temporary for diverticulitis     HEART CATH, ANGIOPLASTY  08-18-16    LAD PCI. Stented with a 3.0 x 8 mm Xience Alpine stent.     STENT,  "CORONARY, S660 15/18  12/2015    VANITA=Diag, PTCA=LAD     STENT, CORONARY, S660 15/18  06/2015    VANITA=LAD       FAMILY HISTORY:  Family History   Problem Relation Age of Onset     HEART DISEASE Brother 40     CABG     KIDNEY DISEASE Sister      Hypertension Sister      HEART DISEASE Brother      Dilated aorta       SOCIAL HISTORY:  Social History     Social History     Marital status:      Spouse name: N/A     Number of children: N/A     Years of education: N/A     Social History Main Topics     Smoking status: Former Smoker     Types: Cigarettes     Smokeless tobacco: Never Used     Alcohol use No     Drug use: No     Sexual activity: Not Asked     Other Topics Concern     Parent/Sibling W/ Cabg, Mi Or Angioplasty Before 65f 55m? Yes     Caffeine Concern Yes     2 cups daily     Sleep Concern Yes     Special Diet No      more proteins     Exercise Yes     Cardiac rehab      Social History Narrative       Review of Systems:  Skin:  Positive for itching     Eyes:  Positive for glasses    ENT:  Negative      Respiratory:  Negative       Cardiovascular:    lightheadedness;Positive for;palpitations;chest pain;edema everyday when waking up is lightheaded/dizzy specially after dialysis  Gastroenterology: Positive for reflux occas heartburn   Genitourinary:  Positive for urgency;decreased urinary stream sometimes it hurts   Musculoskeletal:  Positive for back pain lower back pain - sharp lately uses Nitro and pain goes away   Neurologic:  Positive for numbness or tingling of feet;headaches    Psychiatric:  Negative      Heme/Lymph/Imm:  Negative      Endocrine:  Positive for diabetes      Physical Exam:  Vitals: /72 (BP Location: Right arm, Patient Position: Sitting, Cuff Size: Adult Regular)  Pulse 65  Ht 1.803 m (5' 11\")  Wt 89.8 kg (198 lb)  SpO2 91%  BMI 27.62 kg/m2    General- appears comfortable  Neck- normal JVP, no bruit  Cardiovascular system- s1s2 normal, no m/r/g  Respiratory system- CTA " b/l  Abdomen- soft, non tender  Extremities- no pedal edema  Neurological - alert, oriented  Psych- normal affect  HEENT- no pallor  Thank you for allowing me to participate in the care of your patient.    Sincerely,     Arnoldo Diaz MD     [facility]

## 2017-08-24 DIAGNOSIS — I25.10 CORONARY ARTERY DISEASE INVOLVING NATIVE HEART, ANGINA PRESENCE UNSPECIFIED, UNSPECIFIED VESSEL OR LESION TYPE: ICD-10-CM

## 2017-08-24 DIAGNOSIS — I15.1 HYPERTENSION SECONDARY TO OTHER RENAL DISORDERS: ICD-10-CM

## 2017-08-24 RX ORDER — ISOSORBIDE MONONITRATE 30 MG/1
90 TABLET, EXTENDED RELEASE ORAL EVERY EVENING
Qty: 270 TABLET | Refills: 1 | Status: ON HOLD | OUTPATIENT
Start: 2017-08-24 | End: 2017-12-28

## 2017-08-24 NOTE — TELEPHONE ENCOUNTER
Received refill request for:  Isosorbide mononitrate  Last OV was: 8/11/2017 with Dr. Diaz  Labs/EKG: n/a  F/U scheduled: orders in Epic for 12/2017  New script sent to: [facility]

## 2017-09-19 ENCOUNTER — APPOINTMENT (OUTPATIENT)
Dept: ULTRASOUND IMAGING | Facility: CLINIC | Age: 67
End: 2017-09-19
Attending: EMERGENCY MEDICINE
Payer: MEDICARE

## 2017-09-19 ENCOUNTER — TRANSFERRED RECORDS (OUTPATIENT)
Dept: HEALTH INFORMATION MANAGEMENT | Facility: CLINIC | Age: 67
End: 2017-09-19

## 2017-09-19 ENCOUNTER — HOSPITAL ENCOUNTER (EMERGENCY)
Facility: CLINIC | Age: 67
Discharge: ANOTHER HOSPITAL | End: 2017-09-19
Attending: EMERGENCY MEDICINE | Admitting: EMERGENCY MEDICINE
Payer: MEDICARE

## 2017-09-19 ENCOUNTER — HOSPITAL ENCOUNTER (OUTPATIENT)
Facility: CLINIC | Age: 67
Setting detail: OBSERVATION
Discharge: HOME | End: 2017-09-20
Attending: INTERNAL MEDICINE | Admitting: INTERNAL MEDICINE
Payer: MEDICARE

## 2017-09-19 VITALS
DIASTOLIC BLOOD PRESSURE: 97 MMHG | HEIGHT: 69 IN | OXYGEN SATURATION: 94 % | TEMPERATURE: 97.6 F | RESPIRATION RATE: 19 BRPM | SYSTOLIC BLOOD PRESSURE: 169 MMHG

## 2017-09-19 DIAGNOSIS — T82.590A DIALYSIS AV FISTULA MALFUNCTION, INITIAL ENCOUNTER (H): ICD-10-CM

## 2017-09-19 PROBLEM — J96.90 RESPIRATORY FAILURE (H): Status: RESOLVED | Noted: 2017-01-06 | Resolved: 2017-09-19

## 2017-09-19 PROBLEM — J18.9 PNEUMONIA: Status: RESOLVED | Noted: 2017-03-28 | Resolved: 2017-09-19

## 2017-09-19 LAB
ANION GAP SERPL CALCULATED.3IONS-SCNC: 8 MMOL/L (ref 3–14)
APTT PPP: 31 SEC (ref 22–37)
BASOPHILS # BLD AUTO: 0.1 10E9/L (ref 0–0.2)
BASOPHILS NFR BLD AUTO: 0.9 %
BUN SERPL-MCNC: 35 MG/DL (ref 7–30)
CALCIUM SERPL-MCNC: 9.1 MG/DL (ref 8.5–10.1)
CHLORIDE SERPL-SCNC: 93 MMOL/L (ref 94–109)
CO2 SERPL-SCNC: 29 MMOL/L (ref 20–32)
CREAT SERPL-MCNC: 5.53 MG/DL (ref 0.66–1.25)
DIFFERENTIAL METHOD BLD: ABNORMAL
EOSINOPHIL # BLD AUTO: 0.1 10E9/L (ref 0–0.7)
EOSINOPHIL NFR BLD AUTO: 1.4 %
ERYTHROCYTE [DISTWIDTH] IN BLOOD BY AUTOMATED COUNT: 18.3 % (ref 10–15)
GFR SERPL CREATININE-BSD FRML MDRD: 10 ML/MIN/1.7M2
GLUCOSE BLDC GLUCOMTR-MCNC: 119 MG/DL (ref 70–99)
GLUCOSE BLDC GLUCOMTR-MCNC: 137 MG/DL (ref 70–99)
GLUCOSE SERPL-MCNC: 151 MG/DL (ref 70–99)
HCT VFR BLD AUTO: 32.8 % (ref 40–53)
HGB BLD-MCNC: 9.8 G/DL (ref 13.3–17.7)
IMM GRANULOCYTES # BLD: 0 10E9/L (ref 0–0.4)
IMM GRANULOCYTES NFR BLD: 0.7 %
INR PPP: 0.94 (ref 0.86–1.14)
LYMPHOCYTES # BLD AUTO: 1 10E9/L (ref 0.8–5.3)
LYMPHOCYTES NFR BLD AUTO: 16.9 %
MCH RBC QN AUTO: 28 PG (ref 26.5–33)
MCHC RBC AUTO-ENTMCNC: 29.9 G/DL (ref 31.5–36.5)
MCV RBC AUTO: 94 FL (ref 78–100)
MONOCYTES # BLD AUTO: 0.4 10E9/L (ref 0–1.3)
MONOCYTES NFR BLD AUTO: 7.6 %
NEUTROPHILS # BLD AUTO: 4.2 10E9/L (ref 1.6–8.3)
NEUTROPHILS NFR BLD AUTO: 72.5 %
NRBC # BLD AUTO: 0 10*3/UL
NRBC BLD AUTO-RTO: 0 /100
PLATELET # BLD AUTO: 160 10E9/L (ref 150–450)
POTASSIUM SERPL-SCNC: 3.8 MMOL/L (ref 3.4–5.3)
RBC # BLD AUTO: 3.5 10E12/L (ref 4.4–5.9)
SODIUM SERPL-SCNC: 130 MMOL/L (ref 133–144)
WBC # BLD AUTO: 5.8 10E9/L (ref 4–11)

## 2017-09-19 PROCEDURE — 93990 DOPPLER FLOW TESTING: CPT

## 2017-09-19 PROCEDURE — 80048 BASIC METABOLIC PNL TOTAL CA: CPT | Performed by: EMERGENCY MEDICINE

## 2017-09-19 PROCEDURE — 96372 THER/PROPH/DIAG INJ SC/IM: CPT

## 2017-09-19 PROCEDURE — G0472 HEP C SCREEN HIGH RISK/OTHER: HCPCS | Performed by: EMERGENCY MEDICINE

## 2017-09-19 PROCEDURE — 85610 PROTHROMBIN TIME: CPT | Performed by: EMERGENCY MEDICINE

## 2017-09-19 PROCEDURE — A9270 NON-COVERED ITEM OR SERVICE: HCPCS | Mod: GY | Performed by: INTERNAL MEDICINE

## 2017-09-19 PROCEDURE — 99285 EMERGENCY DEPT VISIT HI MDM: CPT | Mod: 25

## 2017-09-19 PROCEDURE — 00000146 ZZHCL STATISTIC GLUCOSE BY METER IP

## 2017-09-19 PROCEDURE — 25000132 ZZH RX MED GY IP 250 OP 250 PS 637: Mod: GY | Performed by: INTERNAL MEDICINE

## 2017-09-19 PROCEDURE — 87389 HIV-1 AG W/HIV-1&-2 AB AG IA: CPT | Performed by: EMERGENCY MEDICINE

## 2017-09-19 PROCEDURE — 85730 THROMBOPLASTIN TIME PARTIAL: CPT | Performed by: EMERGENCY MEDICINE

## 2017-09-19 PROCEDURE — 99220 ZZC INITIAL OBSERVATION CARE,LEVL III: CPT | Performed by: INTERNAL MEDICINE

## 2017-09-19 PROCEDURE — 25000131 ZZH RX MED GY IP 250 OP 636 PS 637: Mod: GY | Performed by: INTERNAL MEDICINE

## 2017-09-19 PROCEDURE — 85025 COMPLETE CBC W/AUTO DIFF WBC: CPT | Performed by: EMERGENCY MEDICINE

## 2017-09-19 PROCEDURE — G0378 HOSPITAL OBSERVATION PER HR: HCPCS

## 2017-09-19 RX ORDER — AMOXICILLIN 250 MG
1-2 CAPSULE ORAL 2 TIMES DAILY
Status: DISCONTINUED | OUTPATIENT
Start: 2017-09-19 | End: 2017-09-20 | Stop reason: HOSPADM

## 2017-09-19 RX ORDER — ONDANSETRON 2 MG/ML
4 INJECTION INTRAMUSCULAR; INTRAVENOUS EVERY 6 HOURS PRN
Status: DISCONTINUED | OUTPATIENT
Start: 2017-09-19 | End: 2017-09-20 | Stop reason: HOSPADM

## 2017-09-19 RX ORDER — MYCOPHENOLATE MOFETIL 500 MG/1
500 TABLET ORAL 2 TIMES DAILY
Status: DISCONTINUED | OUTPATIENT
Start: 2017-09-19 | End: 2017-09-20 | Stop reason: HOSPADM

## 2017-09-19 RX ORDER — ATORVASTATIN CALCIUM 40 MG/1
40 TABLET, FILM COATED ORAL AT BEDTIME
Status: DISCONTINUED | OUTPATIENT
Start: 2017-09-19 | End: 2017-09-20 | Stop reason: HOSPADM

## 2017-09-19 RX ORDER — AMLODIPINE BESYLATE 2.5 MG/1
2.5 TABLET ORAL DAILY
Status: DISCONTINUED | OUTPATIENT
Start: 2017-09-20 | End: 2017-09-20 | Stop reason: HOSPADM

## 2017-09-19 RX ORDER — ABACAVIR 300 MG/1
600 TABLET ORAL EVERY EVENING
Status: DISCONTINUED | OUTPATIENT
Start: 2017-09-19 | End: 2017-09-20 | Stop reason: HOSPADM

## 2017-09-19 RX ORDER — ALBUTEROL SULFATE 90 UG/1
2 AEROSOL, METERED RESPIRATORY (INHALATION) EVERY 6 HOURS PRN
Status: DISCONTINUED | OUTPATIENT
Start: 2017-09-19 | End: 2017-09-20 | Stop reason: HOSPADM

## 2017-09-19 RX ORDER — DAPSONE 100 MG/1
100 TABLET ORAL AT BEDTIME
Status: DISCONTINUED | OUTPATIENT
Start: 2017-09-19 | End: 2017-09-20 | Stop reason: HOSPADM

## 2017-09-19 RX ORDER — DEXTROSE MONOHYDRATE 25 G/50ML
25-50 INJECTION, SOLUTION INTRAVENOUS
Status: DISCONTINUED | OUTPATIENT
Start: 2017-09-19 | End: 2017-09-20 | Stop reason: HOSPADM

## 2017-09-19 RX ORDER — HYDRALAZINE HYDROCHLORIDE 25 MG/1
25 TABLET, FILM COATED ORAL 2 TIMES DAILY PRN
Status: DISCONTINUED | OUTPATIENT
Start: 2017-09-19 | End: 2017-09-20 | Stop reason: HOSPADM

## 2017-09-19 RX ORDER — GABAPENTIN 300 MG/1
600 CAPSULE ORAL EVERY EVENING
Status: DISCONTINUED | OUTPATIENT
Start: 2017-09-19 | End: 2017-09-20 | Stop reason: HOSPADM

## 2017-09-19 RX ORDER — NICOTINE POLACRILEX 4 MG
15-30 LOZENGE BUCCAL
Status: DISCONTINUED | OUTPATIENT
Start: 2017-09-19 | End: 2017-09-20 | Stop reason: HOSPADM

## 2017-09-19 RX ORDER — ONDANSETRON 4 MG/1
4 TABLET, ORALLY DISINTEGRATING ORAL EVERY 6 HOURS PRN
Status: DISCONTINUED | OUTPATIENT
Start: 2017-09-19 | End: 2017-09-20 | Stop reason: HOSPADM

## 2017-09-19 RX ORDER — CLONAZEPAM 0.5 MG/1
0.5 TABLET ORAL
Status: DISCONTINUED | OUTPATIENT
Start: 2017-09-19 | End: 2017-09-20 | Stop reason: HOSPADM

## 2017-09-19 RX ORDER — MAGNESIUM OXIDE 400 MG/1
400 TABLET ORAL AT BEDTIME
Status: DISCONTINUED | OUTPATIENT
Start: 2017-09-19 | End: 2017-09-20 | Stop reason: HOSPADM

## 2017-09-19 RX ORDER — LOSARTAN POTASSIUM 100 MG/1
100 TABLET ORAL AT BEDTIME
Status: DISCONTINUED | OUTPATIENT
Start: 2017-09-19 | End: 2017-09-20 | Stop reason: HOSPADM

## 2017-09-19 RX ORDER — PANTOPRAZOLE SODIUM 40 MG/1
40 TABLET, DELAYED RELEASE ORAL EVERY MORNING
Status: DISCONTINUED | OUTPATIENT
Start: 2017-09-20 | End: 2017-09-20 | Stop reason: HOSPADM

## 2017-09-19 RX ORDER — ACETAMINOPHEN 325 MG/1
650 TABLET ORAL EVERY 4 HOURS PRN
Status: DISCONTINUED | OUTPATIENT
Start: 2017-09-19 | End: 2017-09-20 | Stop reason: HOSPADM

## 2017-09-19 RX ORDER — ISOSORBIDE MONONITRATE 30 MG/1
90 TABLET, EXTENDED RELEASE ORAL EVERY EVENING
Status: DISCONTINUED | OUTPATIENT
Start: 2017-09-19 | End: 2017-09-20 | Stop reason: HOSPADM

## 2017-09-19 RX ORDER — ACETAMINOPHEN 325 MG/1
650 TABLET ORAL ONCE
Status: DISCONTINUED | OUTPATIENT
Start: 2017-09-19 | End: 2017-09-19 | Stop reason: HOSPADM

## 2017-09-19 RX ORDER — NALOXONE HYDROCHLORIDE 0.4 MG/ML
.1-.4 INJECTION, SOLUTION INTRAMUSCULAR; INTRAVENOUS; SUBCUTANEOUS
Status: DISCONTINUED | OUTPATIENT
Start: 2017-09-19 | End: 2017-09-20 | Stop reason: HOSPADM

## 2017-09-19 RX ORDER — GABAPENTIN 300 MG/1
300 CAPSULE ORAL EVERY MORNING
Status: DISCONTINUED | OUTPATIENT
Start: 2017-09-20 | End: 2017-09-20 | Stop reason: HOSPADM

## 2017-09-19 RX ORDER — POLYETHYLENE GLYCOL 3350 17 G/17G
17 POWDER, FOR SOLUTION ORAL DAILY PRN
Status: DISCONTINUED | OUTPATIENT
Start: 2017-09-19 | End: 2017-09-20 | Stop reason: HOSPADM

## 2017-09-19 RX ADMIN — ABACAVIR 600 MG: 300 TABLET, FILM COATED ORAL at 23:43

## 2017-09-19 RX ADMIN — MYCOPHENOLATE MOFETIL 500 MG: 500 TABLET ORAL at 23:36

## 2017-09-19 RX ADMIN — ISOSORBIDE MONONITRATE 90 MG: 30 TABLET, EXTENDED RELEASE ORAL at 21:45

## 2017-09-19 RX ADMIN — SENNOSIDES AND DOCUSATE SODIUM 2 TABLET: 8.6; 5 TABLET ORAL at 21:45

## 2017-09-19 RX ADMIN — DAPSONE 100 MG: 100 TABLET ORAL at 23:36

## 2017-09-19 RX ADMIN — MAGNESIUM OXIDE TAB 400 MG (241.3 MG ELEMENTAL MG) 400 MG: 400 (241.3 MG) TAB at 21:45

## 2017-09-19 RX ADMIN — INSULIN GLARGINE 25 UNITS: 100 INJECTION, SOLUTION SUBCUTANEOUS at 23:36

## 2017-09-19 RX ADMIN — DOLUTEGRAVIR SODIUM 50 MG: 50 TABLET, FILM COATED ORAL at 23:36

## 2017-09-19 RX ADMIN — LOSARTAN POTASSIUM 100 MG: 100 TABLET, FILM COATED ORAL at 21:45

## 2017-09-19 RX ADMIN — GABAPENTIN 600 MG: 300 CAPSULE ORAL at 21:45

## 2017-09-19 RX ADMIN — ATORVASTATIN CALCIUM 40 MG: 40 TABLET, FILM COATED ORAL at 21:45

## 2017-09-19 RX ADMIN — DARUNAVIR 800 MG: 800 TABLET, FILM COATED ORAL at 23:36

## 2017-09-19 ASSESSMENT — ACTIVITIES OF DAILY LIVING (ADL)
COGNITION: 0 - NO COGNITION ISSUES REPORTED
RETIRED_COMMUNICATION: 0-->UNDERSTANDS/COMMUNICATES WITHOUT DIFFICULTY
SWALLOWING: 0-->SWALLOWS FOODS/LIQUIDS WITHOUT DIFFICULTY
RETIRED_EATING: 0-->INDEPENDENT
AMBULATION: 1-->ASSISTIVE EQUIPMENT
TRANSFERRING: 1-->ASSISTIVE EQUIPMENT
FALL_HISTORY_WITHIN_LAST_SIX_MONTHS: NO
DRESS: 0-->INDEPENDENT
TOILETING: 1-->ASSISTIVE EQUIPMENT
BATHING: 1-->ASSISTIVE EQUIPMENT

## 2017-09-19 NOTE — IP AVS SNAPSHOT
MRN:[id number]                      After Visit Summary   9/19/2017    Donald Raza    MRN: [id number]           Thank you!     Thank you for choosing [facility] for your care. Our goal is always to provide you with excellent care. Hearing back from our patients is one way we can continue to improve our services. Please take a few minutes to complete the written survey that you may receive in the mail after you visit with us. Thank you!        Patient Information     Date Of Birth          1950        About your hospital stay     You were admitted on:  September 19, 2017 You last received care in the:  [facility] [address] [facility]    You were discharged on:  September 20, 2017        Reason for your hospital stay       You were admitted to the hospital due to bleeding from the fistula site.                  Who to Call     For medical emergencies, please call 911.  For non-urgent questions about your medical care, please call your primary care provider or clinic, [phone]          Attending Provider     Provider Specialty    Alycia Del Rosario MD Internal Medicine    Marek, Nas Vazquez MD Internal Medicine    Li, MD Rachel Internal Medicine       Primary Care Provider Office Phone # Fax #    Aida Thomas [phone]      After Care Instructions     Activity       Your activity upon discharge: activity as tolerated            Diet       Follow this diet upon discharge: Orders Placed This Encounter      Combination Diet Dialysis Diet; 7005-1846 Calories: Moderate Consistent CHO (4-6 CHO units/meal)                  Follow-up Appointments     Follow-up and recommended labs and tests        Follow up with primary care provider, Aida Thomas, within 7 days for hospital follow- up.  The following labs/tests are recommended: hgb.                  Further instructions from your care team       GO TO DIALYSIS AS PREVIOUSLY SCHEDULED ON  "THURS.    TAKE IT EASY THE NEXT TWO DAYS AND HAVE SOMEONE WITH YOU AT HOME.    FOLLOW UP WITH DR. CARMEN IN 2 WEEKS. [phone]    Pending Results     Date and Time Order Name Status Description    2017 2128 IR Dialysis Fistulogram Left In process             Statement of Approval     Ordered          17 1424  I have reviewed and agree with all the recommendations and orders detailed in this document.  EFFECTIVE NOW     Approved and electronically signed by:  Rachel Li MD             Admission Information     Date & Time Provider Department Dept. Phone    2017 Rachel Li MD [place] [facility] [address] [facility] [phone]      Your Vitals Were     Blood Pressure Pulse Temperature Respirations Pulse Oximetry       151/76 (BP Location: Right arm) 75 98.5  F (36.9  C) (Oral) 20 93%       MyChart Information     [facility] lets you send messages to your doctor, view your test results, renew your prescriptions, schedule appointments and more. To sign up, go to [email or web address] . Click on \"Log in\" on the left side of the screen, which will take you to the Welcome page. Then click on \"Sign up Now\" on the right side of the page.     You will be asked to enter the access code listed below, as well as some personal information. Please follow the directions to create your username and password.     Your access code is: [email or web address]  Expires: 2017  1:01 AM     Your access code will  in 90 days. If you need help or a new code, please call your [facility] clinic or [phone].        Care EveryWhere ID     This is your Care EveryWhere ID. This could be used by other organizations to access your [facility] medical records  [id number]        Equal Access to Services     [facility] : Adelaida Dolan, waeric mendiola, qaybta kaalmarino núñez, fatoumata olivo. So [facility] [phone].    ATENCIÓN: Si habla español, tiene a ayala disposición " servicios gratuitos de asistencia lingüística. [facility] [phone].    We comply with applicable federal civil rights laws and [place] laws. We do not discriminate on the basis of race, color, national origin, age, disability sex, sexual orientation or gender identity.               Review of your medicines      CONTINUE these medicines which may have CHANGED, or have new prescriptions. If we are uncertain of the size of tablets/capsules you have at home, strength may be listed as something that might have changed.        Dose / Directions    * amLODIPine 5 MG tablet   Commonly known as:  NORVASC   This may have changed:  Another medication with the same name was changed. Make sure you understand how and when to take each.        Dose:  5 mg   Take 5 mg by mouth daily as needed (only uses when blood pressure greater than 150-160)   Refills:  0       * amLODIPine 2.5 MG tablet   Commonly known as:  NORVASC   This may have changed:  additional instructions   Used for:  Hypertension secondary to other renal disorders        Dose:  2.5 mg   Take 1 tablet (2.5 mg) by mouth daily   Quantity:  30 tablet   Refills:  0       insulin glargine 100 UNIT/ML injection   Commonly known as:  LANTUS   This may have changed:    - how much to take  - when to take this   Used for:  Type 2 diabetes mellitus with chronic kidney disease on chronic dialysis, unspecified long term insulin use status (H)        Dose:  36 Units   Inject 36 Units Subcutaneous every morning   Refills:  0       * Notice:  This list has 2 medication(s) that are the same as other medications prescribed for you. Read the directions carefully, and ask your doctor or other care provider to review them with you.      CONTINUE these medicines which have NOT CHANGED        Dose / Directions    abacavir 300 MG tablet   Commonly known as:  ZIAGEN        Dose:  600 mg   Take 600 mg by mouth every evening   Refills:  0       albuterol 108 (90 BASE) MCG/ACT Inhaler    Commonly known as:  PROAIR HFA/PROVENTIL HFA/VENTOLIN HFA   Used for:  Cough        Dose:  2 puff   Inhale 2 puffs into the lungs every 6 hours as needed for shortness of breath / dyspnea or wheezing   Quantity:  1 Inhaler   Refills:  1       atorvastatin 40 MG tablet   Commonly known as:  LIPITOR        Dose:  40 mg   Take 40 mg by mouth At Bedtime   Refills:  0       B COMPLEX-C-FOLIC ACID PO        Refills:  0       chlorhexidine 0.12 % solution   Commonly known as:  PERIDEX        Rinse and gargle 15 ml by mouth twice a day as directed.   Refills:  0       CLONAZEPAM PO        Dose:  0.5 mg   Take 0.5 mg by mouth nightly as needed for anxiety (restless legs)   Refills:  0       CLOPIDOGREL BISULFATE PO        Dose:  75 mg   Take 75 mg by mouth every evening   Refills:  0       dapsone 100 MG tablet        Dose:  100 mg   Take 100 mg by mouth At Bedtime   Refills:  0       dolutegravir 50 MG tablet   Commonly known as:  TIVICAY        Dose:  50 mg   Take 50 mg by mouth At Bedtime   Refills:  0       DOXERCALCIFEROL IV        Dose:  6 mcg   Inject 6 mcg into the vein three times a week (with dialysis)   Refills:  0       epoetin brittni 06113 UNIT/ML injection   Commonly known as:  EPOGEN/PROCRIT        Dose:  53931 Units   Inject 11,000 Units Subcutaneous three times a week WITH DIALYSIS   Refills:  0       * gabapentin 300 MG capsule   Commonly known as:  NEURONTIN        Dose:  300 mg   Take 300 mg by mouth every morning   Refills:  0       * gabapentin 300 MG capsule   Commonly known as:  NEURONTIN        Dose:  600 mg   Take 600 mg by mouth every evening   Refills:  0       HYDRALAZINE HCL PO        Dose:  25 mg   Take 25 mg by mouth 2 times daily as needed for high blood pressure   Refills:  0       imiquimod 5 % cream   Commonly known as:  ALDARA        Apply topically as needed   Refills:  0       insulin lispro 100 UNIT/ML injection   Commonly known as:  HumaLOG KWIKpen   Used for:  Type 2 diabetes  "mellitus with chronic kidney disease on chronic dialysis, unspecified long term insulin use status (H)        Dose:  5 Units   Inject 5 Units Subcutaneous 3 times daily (before meals)   Refills:  0       iron sucrose 20 MG/ML injection   Commonly known as:  VENOFER        Dose:  50 mg   Inject 50 mg into the vein once a week WIth dialysis   Refills:  0       isosorbide mononitrate 30 MG 24 hr tablet   Commonly known as:  IMDUR   Used for:  Coronary artery disease involving native heart, angina presence unspecified, unspecified vessel or lesion type, Hypertension secondary to other renal disorders        Dose:  90 mg   Take 3 tablets (90 mg) by mouth every evening   Quantity:  270 tablet   Refills:  1       lamISIL AT 1 % cream   Generic drug:  terbinafine        Apply topically 2 times daily as needed   Refills:  0       losartan 100 MG tablet   Commonly known as:  COZAAR   Used for:  Hypertension secondary to other renal disorders        Dose:  100 mg   Take 1 tablet (100 mg) by mouth At Bedtime   Quantity:  30 tablet   Refills:  0       MAGNESIUM OXIDE PO        Dose:  400 mg   Take 400 mg by mouth At Bedtime   Refills:  0       mycophenolate 500 MG tablet   Commonly known as:  GENERIC EQUIVALENT        Dose:  500 mg   Take 500 mg by mouth 2 times daily On an empty stomach   Refills:  0       NEPRO PO        1-3 times daily   Refills:  0       nitroGLYcerin 0.4 MG sublingual tablet   Commonly known as:  NITROSTAT   Used for:  Coronary artery disease due to lipid rich plaque, Status post coronary angioplasty        One tablet under the tongue every 5 minutes if needed for chest pain. May repeat every 5 minutes for a maximum of 3 doses in 15 minutes\"   Quantity:  25 tablet   Refills:  3       order for DME   Used for:  SOB (shortness of breath), Generalized muscle weakness        Equipment being ordered: Other: 4WW Treatment Diagnosis: Decreased activity tolerance   Quantity:  1 each   Refills:  0       PREZISTA PO "        Dose:  800 mg   Take 800 mg by mouth At Bedtime.   Refills:  0       priLOSEC 40 MG capsule   Generic drug:  omeprazole        Dose:  40 mg   Take 40 mg by mouth daily 1 hour before dinner   Refills:  0       ritonavir 100 MG capsule   Commonly known as:  NORVIR        Dose:  1 capsule   Take 1 capsule by mouth At Bedtime   Refills:  0       * Notice:  This list has 2 medication(s) that are the same as other medications prescribed for you. Read the directions carefully, and ask your doctor or other care provider to review them with you.             Protect others around you: Learn how to safely use, store and throw away your medicines at www.disposemymeds.org.             Medication List: This is a list of all your medications and when to take them. Check marks below indicate your daily home schedule. Keep this list as a reference.      Medications           Morning Afternoon Evening Bedtime As Needed    abacavir 300 MG tablet   Commonly known as:  ZIAGEN   Take 600 mg by mouth every evening   Last time this was given:  600 mg on 9/19/2017 11:43 PM   Next Dose Due:  This evening                                   albuterol 108 (90 BASE) MCG/ACT Inhaler   Commonly known as:  PROAIR HFA/PROVENTIL HFA/VENTOLIN HFA   Inhale 2 puffs into the lungs every 6 hours as needed for shortness of breath / dyspnea or wheezing                                   * amLODIPine 5 MG tablet   Commonly known as:  NORVASC   Take 5 mg by mouth daily as needed (only uses when blood pressure greater than 150-160)   Last time this was given:  2.5 mg on 9/20/2017 10:57 AM   Next Dose Due:  If needed                                     * amLODIPine 2.5 MG tablet   Commonly known as:  NORVASC   Take 1 tablet (2.5 mg) by mouth daily   Last time this was given:  2.5 mg on 9/20/2017 10:57 AM   Next Dose Due:  Tomorrow                                   atorvastatin 40 MG tablet   Commonly known as:  LIPITOR   Take 40 mg by mouth At Bedtime    Last time this was given:  40 mg on 9/19/2017  9:45 PM   Next Dose Due:  Bedtime tonight                                   B COMPLEX-C-FOLIC ACID PO   Next Dose Due:  Tomorrow                                     chlorhexidine 0.12 % solution   Commonly known as:  PERIDEX   Rinse and gargle 15 ml by mouth twice a day as directed.   Next Dose Due:  Restart at home                                      CLONAZEPAM PO   Take 0.5 mg by mouth nightly as needed for anxiety (restless legs)                                   CLOPIDOGREL BISULFATE PO   Take 75 mg by mouth every evening   Next Dose Due:  This evening                                   dapsone 100 MG tablet   Take 100 mg by mouth At Bedtime   Last time this was given:  100 mg on 9/19/2017 11:36 PM   Next Dose Due:  Bedtime tonight                                   dolutegravir 50 MG tablet   Commonly known as:  TIVICAY   Take 50 mg by mouth At Bedtime   Last time this was given:  50 mg on 9/19/2017 11:36 PM   Next Dose Due:  Bedtime                                     DOXERCALCIFEROL IV   Inject 6 mcg into the vein three times a week (with dialysis)   Next Dose Due:  Tomorrow with dialysis                                epoetin brittni 50431 UNIT/ML injection   Commonly known as:  EPOGEN/PROCRIT   Inject 11,000 Units Subcutaneous three times a week WITH DIALYSIS   Next Dose Due:  With dialysis                                * gabapentin 300 MG capsule   Commonly known as:  NEURONTIN   Take 300 mg by mouth every morning   Last time this was given:  300 mg on 9/20/2017 10:57 AM   Next Dose Due:  Tomorrow.                                   * gabapentin 300 MG capsule   Commonly known as:  NEURONTIN   Take 600 mg by mouth every evening   Last time this was given:  300 mg on 9/20/2017 10:57 AM   Next Dose Due:  Tonight                                     HYDRALAZINE HCL PO   Take 25 mg by mouth 2 times daily as needed for high blood pressure                                    imiquimod 5 % cream   Commonly known as:  ALDARA   Apply topically as needed                                   insulin glargine 100 UNIT/ML injection   Commonly known as:  LANTUS   Inject 36 Units Subcutaneous every morning   Last time this was given:  25 Units on 9/19/2017 11:36 PM   Next Dose Due:  tomorrow                                   insulin lispro 100 UNIT/ML injection   Commonly known as:  HumaLOG KWIKpen   Inject 5 Units Subcutaneous 3 times daily (before meals)   Next Dose Due:  With meal                                         iron sucrose 20 MG/ML injection   Commonly known as:  VENOFER   Inject 50 mg into the vein once a week WIth dialysis   Next Dose Due:  With dialysis                                isosorbide mononitrate 30 MG 24 hr tablet   Commonly known as:  IMDUR   Take 3 tablets (90 mg) by mouth every evening   Last time this was given:  90 mg on 9/19/2017  9:45 PM   Next Dose Due:  Tonight                                     lamISIL AT 1 % cream   Apply topically 2 times daily as needed   Generic drug:  terbinafine                                   losartan 100 MG tablet   Commonly known as:  COZAAR   Take 1 tablet (100 mg) by mouth At Bedtime   Last time this was given:  100 mg on 9/19/2017  9:45 PM   Next Dose Due:  Bedtime tonight                                     MAGNESIUM OXIDE PO   Take 400 mg by mouth At Bedtime   Last time this was given:  400 mg on 9/19/2017  9:45 PM   Next Dose Due:  Bedtime tonight                                     mycophenolate 500 MG tablet   Commonly known as:  GENERIC EQUIVALENT   Take 500 mg by mouth 2 times daily On an empty stomach   Last time this was given:  500 mg on 9/20/2017 10:57 AM   Next Dose Due:  Bedtime tonight                                        NEPRO PO   1-3 times daily                                nitroGLYcerin 0.4 MG sublingual tablet   Commonly known as:  NITROSTAT   One tablet under the tongue every 5 minutes if needed for  "chest pain. May repeat every 5 minutes for a maximum of 3 doses in 15 minutes\"                                   order for DME   Equipment being ordered: Other: 4WW Treatment Diagnosis: Decreased activity tolerance                                PREZISTA PO   Take 800 mg by mouth At Bedtime.   Last time this was given:  800 mg on 9/19/2017 11:36 PM   Next Dose Due:  Bedtime tonight                                   priLOSEC 40 MG capsule   Take 40 mg by mouth daily 1 hour before dinner   Generic drug:  omeprazole   Next Dose Due:  This evening                                   ritonavir 100 MG capsule   Commonly known as:  NORVIR   Take 1 capsule by mouth At Bedtime   Next Dose Due:  Bedtime tonight                                   * Notice:  This list has 4 medication(s) that are the same as other medications prescribed for you. Read the directions carefully, and ask your doctor or other care provider to review them with you.      "

## 2017-09-19 NOTE — ED PROVIDER NOTES
History     Chief Complaint:  Shunt Malfunction    HPI   Donald Raza is a 67 year old male who presents to the emergency department today via EMS for evaluation of a dialysis shunt bleed. The patient was undergoing dialysis earlier today when he had a malfunction with his dialysis shunt and they were unable to get it to stop bleeding. The patient states that he has had this before and states that the last time this happened the gel worked best for him to stop his bleeding.    Allergies:  Calcium acetate  Diagnostic X ray materials  Lisinopril  Sulfa drugs    Medications:    isosorbide mononitrate  B COMPLEX-C-FOLIC ACID  insulin glargine injection  amlodipine 2.5 MG tablet  amlodipine 5 MG tablet  HYDRALAZINE HCl  albuterol Inhaler  omeprazole  insulin lispro  mycophenolate  losartan  gabapentin  imiquimod  DOXERCALCIFEROL  CLONAZEPAM   CLOPIDOGREL BISULFATE  abacavir  chlorhexidine  terbinafine  atorvastatin  epoetin brittni  iron sucrose  MAGNESIUM OXIDE  dolutegravir  nitroglycerin  gabapentin  dapsone  Darunavir Ethanolate  ritonavir    Past Medical History:    ACS  Allergic rhinitis  Bilateral pneumonia  Huang disease  Bradycardia  CAD s/p percutaneous coronary angioplasty  CKD  dilated aortic root  ESRD  Hemodialysis associated hypotension  HIV  Hypertension  Hypotension  Impotence  Increased prostate specific antigen velocity  Mixed hyperlipidemia  Near syncope  NSTEMI  Pulmonary HTN TIA  Type 2 diabetes mellitus  Angina      Past Surgical History:    Angiogram  Appendectomy  laparoscopic cholecystectomy   Colostomy  Heart cath, angioplasty  Coronary stent    Family History:    Brother- CABG, dilated aorta  Sister- kidney disease, hypertension    Social History:  Marital Status:   [2]  Social History   Substance Use Topics     Smoking status: Former Smoker     Types: Cigarettes     Smokeless tobacco: Never Used     Alcohol use No        Review of Systems   Allergic/Immunologic: Positive for  immunocompromised state.   Hematological:        Bleeding that is not stopping   All other systems reviewed and are negative.    Physical Exam     Vitals:   BP Temp Temp src Heart Rate Resp SpO2   09/19/17 1245 177/89 97.6  F (36.4  C) Oral 76 17 97 %        Physical Exam  General/Appearance: appears stated age, well-groomed, appears comfortable  Eyes: EOMI, no scleral injection, no icterus  ENT: MMM  Neck: supple, nl ROM, no stiffness  Cardiovascular: RRR, nl S1S2, no m/r/g, 2+ pulses in all 4 extremities, palpable thrill to LUE, LUE fistula with active and forceful pulsatile bleeding,  Clamp on LUE bleeding site  Respiratory: CTAB, good air movement throughout, no wheezes/rhonchi/rales, no increased WOB, no retractions  MSK: VALENTIN, good tone, no bony abnormality  Skin: warm and well-perfused, no rash, no edema, no ecchymosis, nl turgor  Neuro: GCS 15, alert and oriented, no gross focal neuro deficits    Emergency Department Course     Imaging:  Radiology findings were communicated with the patient who voiced understanding of the findings.    US Ext Arterial Venous Dialysis Acs Graft  Final Result  IMPRESSION: Prolonged bleeding after dialysis likely related to  high-grade stenosis in the cephalic vein at the level of the proximal  humerus. Consider fistulogram with angioplasty.    ARMIDA MAYER MD  Reading per radiology     US Ext Arterial Venous Dialys Acs Graft   Final Result   IMPRESSION: Prolonged bleeding after dialysis likely related to   high-grade stenosis in the cephalic vein at the level of the proximal   humerus. Consider fistulogram with angioplasty.      ARMIDA MAYER MD        Laboratory:  Laboratory findings were communicated with the patient who voiced understanding of the findings.    INR: 0.94  PTT: 31  CBC: WBC 5.8, HGB 9.8(L),   BMP: Na 130 (L), Cl 93 (L), glucose 151 (H), BUN 35 (H), GFR 10 (L), o/w WNL (Creatinine 5.53)  Glucose 137 (H)  Hep B: pending  Hep C: pending  HIV antigen  antibody combo: pending    Interventions:  Medications   acetaminophen (TYLENOL) tablet 650 mg (not administered)     Emergency Department Course:  Nursing notes and vitals reviewed.  I performed an exam of the patient as documented above.   1353 Dr. Thompson would like a venous duplex  1408 clamp applied   Orders Placed This Encounter   Procedures     US Ext Arterial Venous Dialys Acs Graft     CBC with platelets differential     INR     PTT     Basic metabolic panel     Glucose by meter     Hepatitis B surface antigen     Hepatitis C antibody     HIV Antigen Antibody Combo     Hepatitis B surface antigen     Hepatitis C antibody     HIV Antigen Antibody Combo     Peripheral IV catheter       Impression & Plan      Medical Decision Making:  This patient is a 67-year-old, HIV-positive male, who presents from dialysis with an AV fistula site that they were unable to stop bleeding.  Here we attempted to examine the site and took the clamp off at which point there is significant pulsatile bleeding that did result in staff exposure. We reapplied direct pressure for 40 more minutes and Gelfoam.during this time I also spoke with Dr. Thompson with vascular surgery who agreed the patient should go to [facility] as it likely was pathology with the fistula causing this type of forceful bleeding.  He did ask that an venous ultrasound of the left upper extremity be obtained. This does show a severe stenosis of the cephalic vein. Bleeding continues to be stopped however the patient does need fistulogram versus other intervention and will be transferred to [facility].    Diagnosis:      ICD-10-CM    1. Dialysis AV fistula malfunction, initial encounter (H) T82.590A      Disposition:   Transfer to [place]    Scribe Disclosure:  Matty BARRERA, am serving as a scribe at 1:07 PM on 9/19/2017 to document services personally performed by Leilani Stratton MD based on my observations and the provider's statements to me.      SUSHILA  [facility] EMERGENCY DEPARTMENT       Leilani Stratton MD  09/19/17 7641

## 2017-09-19 NOTE — ED NOTES
Bed: ED09  Expected date: 9/19/17  Expected time: 12:28 PM  Means of arrival:   Comments:  A-593 66 yo M dialysis shunt bleed

## 2017-09-19 NOTE — ED NOTES
Report called to [facility]   HERRERA Ann. Arranging transport. Pt bleeding is maintained and currently asleep.

## 2017-09-19 NOTE — IP AVS SNAPSHOT
[facility] [facility]    [address] [place] [address]    Phone:  [phone]                                       After Visit Summary   9/19/2017    Donald Raza    MRN: [id number]           After Visit Summary Signature Page     I have received my discharge instructions, and my questions have been answered. I have discussed any challenges I see with this plan with the nurse or doctor.    ..........................................................................................................................................  Patient/Patient Representative Signature      ..........................................................................................................................................  Patient Representative Print Name and Relationship to Patient    ..................................................               ................................................  Date                                            Time    ..........................................................................................................................................  Reviewed by Signature/Title    ...................................................              ..............................................  Date                                                            Time

## 2017-09-19 NOTE — ED NOTES
Pt arrived via ems from [facility] this morning after finishing his run at 1000 and staff unable to control bleeding. Denies pain, reports intermittent HA. Pressure  clamp present to MALACHI MARIE at bedside to assess site. Clamp removed by MD and bleeding restarted at that time. Manual pressure in addition to clamp pt arrived with in place applied.     Vascular surgery paged. Gelfoam applied, manual pressure maintained.

## 2017-09-20 ENCOUNTER — APPOINTMENT (OUTPATIENT)
Dept: INTERVENTIONAL RADIOLOGY/VASCULAR | Facility: CLINIC | Age: 67
End: 2017-09-20
Attending: INTERNAL MEDICINE
Payer: MEDICARE

## 2017-09-20 VITALS
OXYGEN SATURATION: 93 % | HEART RATE: 75 BPM | DIASTOLIC BLOOD PRESSURE: 76 MMHG | RESPIRATION RATE: 20 BRPM | TEMPERATURE: 98.5 F | SYSTOLIC BLOOD PRESSURE: 151 MMHG

## 2017-09-20 LAB
ABO + RH BLD: NORMAL
ABO + RH BLD: NORMAL
ALBUMIN SERPL-MCNC: 3.4 G/DL (ref 3.4–5)
ALP SERPL-CCNC: 99 U/L (ref 40–150)
ALT SERPL W P-5'-P-CCNC: 23 U/L (ref 0–70)
ANION GAP SERPL CALCULATED.3IONS-SCNC: 11 MMOL/L (ref 3–14)
AST SERPL W P-5'-P-CCNC: 23 U/L (ref 0–45)
BILIRUB DIRECT SERPL-MCNC: 0.2 MG/DL (ref 0–0.2)
BILIRUB SERPL-MCNC: 0.6 MG/DL (ref 0.2–1.3)
BLD GP AB SCN SERPL QL: NORMAL
BLOOD BANK CMNT PATIENT-IMP: NORMAL
BUN SERPL-MCNC: 50 MG/DL (ref 7–30)
CALCIUM SERPL-MCNC: 9.3 MG/DL (ref 8.5–10.1)
CHLORIDE SERPL-SCNC: 93 MMOL/L (ref 94–109)
CO2 SERPL-SCNC: 28 MMOL/L (ref 20–32)
CREAT SERPL-MCNC: 8.12 MG/DL (ref 0.66–1.25)
ERYTHROCYTE [DISTWIDTH] IN BLOOD BY AUTOMATED COUNT: 17.9 % (ref 10–15)
GFR SERPL CREATININE-BSD FRML MDRD: 7 ML/MIN/1.7M2
GLUCOSE BLDC GLUCOMTR-MCNC: 118 MG/DL (ref 70–99)
GLUCOSE BLDC GLUCOMTR-MCNC: 166 MG/DL (ref 70–99)
GLUCOSE BLDC GLUCOMTR-MCNC: 76 MG/DL (ref 70–99)
GLUCOSE BLDC GLUCOMTR-MCNC: 77 MG/DL (ref 70–99)
GLUCOSE SERPL-MCNC: 129 MG/DL (ref 70–99)
HBA1C MFR BLD: 4.9 % (ref 4.3–6)
HCT VFR BLD AUTO: 30.1 % (ref 40–53)
HGB BLD-MCNC: 9.4 G/DL (ref 13.3–17.7)
INR PPP: 1.01 (ref 0.86–1.14)
MCH RBC QN AUTO: 27.5 PG (ref 26.5–33)
MCHC RBC AUTO-ENTMCNC: 31.2 G/DL (ref 31.5–36.5)
MCV RBC AUTO: 88 FL (ref 78–100)
PLATELET # BLD AUTO: 155 10E9/L (ref 150–450)
POTASSIUM SERPL-SCNC: 4.5 MMOL/L (ref 3.4–5.3)
PROT SERPL-MCNC: 7.2 G/DL (ref 6.8–8.8)
RBC # BLD AUTO: 3.42 10E12/L (ref 4.4–5.9)
SODIUM SERPL-SCNC: 132 MMOL/L (ref 133–144)
SPECIMEN EXP DATE BLD: NORMAL
WBC # BLD AUTO: 4.3 10E9/L (ref 4–11)

## 2017-09-20 PROCEDURE — 27210845 ZZH DEVICE INFLATION CR5

## 2017-09-20 PROCEDURE — 27210906 ZZH KIT CR8

## 2017-09-20 PROCEDURE — 00000146 ZZHCL STATISTIC GLUCOSE BY METER IP

## 2017-09-20 PROCEDURE — 83036 HEMOGLOBIN GLYCOSYLATED A1C: CPT | Performed by: INTERNAL MEDICINE

## 2017-09-20 PROCEDURE — 25000131 ZZH RX MED GY IP 250 OP 636 PS 637: Mod: GY | Performed by: INTERNAL MEDICINE

## 2017-09-20 PROCEDURE — C1769 GUIDE WIRE: HCPCS

## 2017-09-20 PROCEDURE — 80076 HEPATIC FUNCTION PANEL: CPT | Performed by: INTERNAL MEDICINE

## 2017-09-20 PROCEDURE — 25000128 H RX IP 250 OP 636: Performed by: RADIOLOGY

## 2017-09-20 PROCEDURE — 96372 THER/PROPH/DIAG INJ SC/IM: CPT

## 2017-09-20 PROCEDURE — 85610 PROTHROMBIN TIME: CPT | Performed by: INTERNAL MEDICINE

## 2017-09-20 PROCEDURE — G0378 HOSPITAL OBSERVATION PER HR: HCPCS

## 2017-09-20 PROCEDURE — 86850 RBC ANTIBODY SCREEN: CPT | Performed by: INTERNAL MEDICINE

## 2017-09-20 PROCEDURE — 27210886 ZZH ACCESSORY CR5

## 2017-09-20 PROCEDURE — A9270 NON-COVERED ITEM OR SERVICE: HCPCS | Mod: GY | Performed by: INTERNAL MEDICINE

## 2017-09-20 PROCEDURE — 99217 ZZC OBSERVATION CARE DISCHARGE: CPT | Performed by: INTERNAL MEDICINE

## 2017-09-20 PROCEDURE — 25000132 ZZH RX MED GY IP 250 OP 250 PS 637: Mod: GY | Performed by: INTERNAL MEDICINE

## 2017-09-20 PROCEDURE — 86901 BLOOD TYPING SEROLOGIC RH(D): CPT | Performed by: INTERNAL MEDICINE

## 2017-09-20 PROCEDURE — 27210742 ZZH CATH CR1

## 2017-09-20 PROCEDURE — 86900 BLOOD TYPING SEROLOGIC ABO: CPT | Performed by: INTERNAL MEDICINE

## 2017-09-20 PROCEDURE — 27210804 ZZH SHEATH CR3

## 2017-09-20 PROCEDURE — 36902 INTRO CATH DIALYSIS CIRCUIT: CPT

## 2017-09-20 PROCEDURE — C1725 CATH, TRANSLUMIN NON-LASER: HCPCS

## 2017-09-20 PROCEDURE — 25000125 ZZHC RX 250: Performed by: RADIOLOGY

## 2017-09-20 PROCEDURE — 80048 BASIC METABOLIC PNL TOTAL CA: CPT | Performed by: INTERNAL MEDICINE

## 2017-09-20 PROCEDURE — 36415 COLL VENOUS BLD VENIPUNCTURE: CPT | Performed by: INTERNAL MEDICINE

## 2017-09-20 PROCEDURE — 85027 COMPLETE CBC AUTOMATED: CPT | Performed by: INTERNAL MEDICINE

## 2017-09-20 RX ORDER — NALOXONE HYDROCHLORIDE 0.4 MG/ML
.1-.4 INJECTION, SOLUTION INTRAMUSCULAR; INTRAVENOUS; SUBCUTANEOUS
Status: DISCONTINUED | OUTPATIENT
Start: 2017-09-20 | End: 2017-09-20

## 2017-09-20 RX ORDER — RITONAVIR 100 MG/1
100 TABLET ORAL AT BEDTIME
Status: DISCONTINUED | OUTPATIENT
Start: 2017-09-20 | End: 2017-09-20 | Stop reason: HOSPADM

## 2017-09-20 RX ORDER — LIDOCAINE HYDROCHLORIDE 10 MG/ML
1-30 INJECTION, SOLUTION EPIDURAL; INFILTRATION; INTRACAUDAL; PERINEURAL
Status: COMPLETED | OUTPATIENT
Start: 2017-09-20 | End: 2017-09-20

## 2017-09-20 RX ORDER — HEPARIN SODIUM 1000 [USP'U]/ML
INJECTION, SOLUTION INTRAVENOUS; SUBCUTANEOUS
Status: DISCONTINUED
Start: 2017-09-20 | End: 2017-09-20 | Stop reason: HOSPADM

## 2017-09-20 RX ORDER — IOPAMIDOL 612 MG/ML
50 INJECTION, SOLUTION INTRAVASCULAR ONCE
Status: COMPLETED | OUTPATIENT
Start: 2017-09-20 | End: 2017-09-20

## 2017-09-20 RX ORDER — FENTANYL CITRATE 50 UG/ML
25-50 INJECTION, SOLUTION INTRAMUSCULAR; INTRAVENOUS EVERY 5 MIN PRN
Status: DISCONTINUED | OUTPATIENT
Start: 2017-09-20 | End: 2017-09-20 | Stop reason: HOSPADM

## 2017-09-20 RX ORDER — FENTANYL CITRATE 50 UG/ML
INJECTION, SOLUTION INTRAMUSCULAR; INTRAVENOUS
Status: DISCONTINUED
Start: 2017-09-20 | End: 2017-09-20 | Stop reason: HOSPADM

## 2017-09-20 RX ORDER — FLUMAZENIL 0.1 MG/ML
0.2 INJECTION, SOLUTION INTRAVENOUS
Status: DISCONTINUED | OUTPATIENT
Start: 2017-09-20 | End: 2017-09-20 | Stop reason: HOSPADM

## 2017-09-20 RX ORDER — LIDOCAINE HYDROCHLORIDE 10 MG/ML
INJECTION, SOLUTION INFILTRATION; PERINEURAL
Status: DISCONTINUED
Start: 2017-09-20 | End: 2017-09-20 | Stop reason: HOSPADM

## 2017-09-20 RX ADMIN — IOPAMIDOL 60 ML: 612 INJECTION, SOLUTION INTRAVENOUS at 09:21

## 2017-09-20 RX ADMIN — INSULIN ASPART 5 UNITS: 100 INJECTION, SOLUTION INTRAVENOUS; SUBCUTANEOUS at 13:59

## 2017-09-20 RX ADMIN — Medication 1 CAPSULE: at 10:57

## 2017-09-20 RX ADMIN — MIDAZOLAM HYDROCHLORIDE 0.5 MG: 1 INJECTION, SOLUTION INTRAMUSCULAR; INTRAVENOUS at 08:58

## 2017-09-20 RX ADMIN — PANTOPRAZOLE SODIUM 40 MG: 40 TABLET, DELAYED RELEASE ORAL at 10:57

## 2017-09-20 RX ADMIN — FENTANYL CITRATE 25 MCG: 50 INJECTION, SOLUTION INTRAMUSCULAR; INTRAVENOUS at 08:58

## 2017-09-20 RX ADMIN — AMLODIPINE BESYLATE 2.5 MG: 2.5 TABLET ORAL at 10:57

## 2017-09-20 RX ADMIN — FENTANYL CITRATE 25 MCG: 50 INJECTION, SOLUTION INTRAMUSCULAR; INTRAVENOUS at 08:41

## 2017-09-20 RX ADMIN — ACETAMINOPHEN 650 MG: 325 TABLET, FILM COATED ORAL at 10:52

## 2017-09-20 RX ADMIN — LIDOCAINE HYDROCHLORIDE 20 MG: 10 INJECTION, SOLUTION INFILTRATION; PERINEURAL at 08:45

## 2017-09-20 RX ADMIN — MIDAZOLAM HYDROCHLORIDE 0.5 MG: 1 INJECTION, SOLUTION INTRAMUSCULAR; INTRAVENOUS at 08:41

## 2017-09-20 RX ADMIN — MYCOPHENOLATE MOFETIL 500 MG: 500 TABLET ORAL at 10:57

## 2017-09-20 RX ADMIN — GABAPENTIN 300 MG: 300 CAPSULE ORAL at 10:57

## 2017-09-20 NOTE — PROGRESS NOTES
"SPIRITUAL HEALTH SERVICES Progress Note  [facility]    D: The patient reported health circumstances and difficulty of the routine. The patient reported career details working in a variety of occupations. The patient reported the change of pace in life from his career to now.     I:  offered non judgemental reflective listening.  offered a blessing.     A: The patient offered details of spiritual life that he is integrating with \"life review.\" The patient reported a Jain maureen. The patient reported coming to now being able to speak about the inner workings of himself.      P:  plans a follow up visit.         Jovi Luna  Chaplain Resident  "

## 2017-09-20 NOTE — PLAN OF CARE
Problem: Goal Outcome Summary  Goal: Goal Outcome Summary  Outcome: Adequate for Discharge Date Met:  09/20/17  Discharge information reviewed with pt. Belongings with pt, nothing in security. VSS, pt left in wheelchair.

## 2017-09-20 NOTE — PLAN OF CARE
Problem: Goal Outcome Summary  Goal: Goal Outcome Summary  Pt A&O x4. VSS on RA. Fistula at LUE c/d/i, no bleeding. Fistulagram today. NPO since midnight. Pt denied pain, SOB. Yellow/brown BM x2, formed. Hgb 9.8. Cr 5.53. GFR 10. Assist of 1, dizzy at times. . Carb count. HIV positive on droplet and contact precautions.

## 2017-09-20 NOTE — PROCEDURES
RADIOLOGY PROCEDURE NOTE  Patient name: Donald Raza  MRN: [id number]  : 1950    Pre-procedure diagnosis: Prolonged bleeding with hemodialysis  Post-procedure diagnosis: Same    Procedure Date/Time: 2017  9:25 AM  Procedure: Fistulagram with 8 mm PTA of fistulized vein in upper arm and centrally within stents.  Good result at completion.  No complications.  Tolerated well.  Estimated blood loss: 5 ml  Specimen(s) collected with description: Please see above  The patient tolerated the procedure well with no immediate complications.  Significant findings:  Please see above.    See imaging dictation for procedural details.    Provider name: Nael Andrade  Assistant(s):None

## 2017-09-20 NOTE — DISCHARGE INSTRUCTIONS
GO TO DIALYSIS AS PREVIOUSLY SCHEDULED ON THURS.    TAKE IT EASY THE NEXT TWO DAYS AND HAVE SOMEONE WITH YOU AT HOME.    FOLLOW UP WITH DR. CARMEN IN 2 WEEKS. [phone]

## 2017-09-20 NOTE — DISCHARGE SUMMARY
[facility]    Discharge Summary  Hospitalist    Date of Admission:  9/19/2017  Date of Discharge:  9/20/2017  Discharging Provider: Rachel Li MD    Discharge Diagnoses   Principal Problem:    Dialysis AV fistula malfunction (H) (9/19/2017)  Active Problems:    Type 2 diabetes mellitus (H) ()    Human immunodeficiency virus (HIV) disease (HCC) ()    Hypertension ()    Mixed hyperlipidemia ()    Coronary artery disease ()    ESRD (end stage renal disease) on dialysis (H) (5/6/2016)    AIDS (acquired immune deficiency syndrome) (H) (6/20/2016)    Anemia due to chronic kidney disease (10/21/2011)      History of Present Illness   Donald Raza is an 67 year old male who presented with bleeding from dialysis fistula.    Hospital Course   Patient is a 67-year-old male with past medical history of AIDS, end-stage renal disease on hemodialysis Tuesday, Thursdays and Saturdays, type 2 diabetes mellitus on insulin, essential hypertension, hyperlipidemia and coronary artery disease who was admitted on September 19 when he presented with bleeding from the dialysis shunt.  His dialysis access is through left AV fistula from where he developed uncontrollable bleeding after assessing for dialysis.  He is on Plavix.  Patient was admitted for further evaluation vascular surgery recommended fistulogram.  Patient was seen by vascular surgery, his hemoglobin was monitored, he was started on Lantus and sliding scale insulin.  His last viral load was undetectable on September 6, 2017 and CD4 counts were 149 he is on a complex five med regimen with abacavir, dolutegravir, darunavir, ritonavir, and mycophenolate. On dapsone for prophylaxis and was continued on his home medications.  She'll underwent fistulogram.  8 mm PTA of this generalized brain and upper arm and centrally within stones.  Good resultant completion patient tolerated the procedure well, patient was cleared from vascular surgery point a few to be  discharged home.  Patient was seen and examined on the day of discharge, patient did not have any concerns regarding chest pain, shortness of breath, palpitations or lightheadedness, he was able to walk with the bedside RN and tolerated the diet well after the fistulogram.  Patient was discharged in good health to home.    Rachel Li MD, FACP    Significant Results and Procedures   Pre-procedure diagnosis: Prolonged bleeding with hemodialysis  Post-procedure diagnosis: Same     Procedure Date/Time: September 20, 2017  9:25 AM  Procedure: Fistulagram with 8 mm PTA of fistulized vein in upper arm and centrally within stents.  Good result at completion.  No complications.  Tolerated well.  Estimated blood loss: 5 ml    Pending Results   None  Unresulted Labs Ordered in the Past 30 Days of this Admission     No orders found for last 61 day(s).          Code Status   Full Code       Primary Care Physician   Aida Thomas    Physical Exam   Temp: 97.4  F (36.3  C) Temp src: Oral BP: 137/73 Pulse: 75 Heart Rate: 62 Resp: 20 SpO2: 94 % O2 Device: None (Room air) Oxygen Delivery: 3 LPM  There were no vitals filed for this visit.  Vital Signs with Ranges  Temp:  [97.4  F (36.3  C)-97.8  F (36.6  C)] 97.4  F (36.3  C)  Pulse:  [73-80] 75  Heart Rate:  [61-71] 62  Resp:  [12-24] 20  BP: (129-187)/() 137/73  SpO2:  [92 %-97 %] 94 %  I/O last 3 completed shifts:  In: -   Out: 180 [Urine:180]    Constitutional: Awake, alert, cooperative, no apparent distress  Respiratory: Clear to auscultation bilaterally, no crackles or wheezing  Cardiovascular: Regular rate and rhythm, normal S1 and S2, and no murmur noted  GI: Normal bowel sounds, soft, non-distended, non-tender  Skin/Integumen: No rashes, no cyanosis, no edema.  Left AV fistula with palpable thrill  Other:     Discharge Disposition   Discharged to home  Condition at discharge: Stable    Consultations This Hospital Stay   VASCULAR SURGERY IP CONSULT  NEPHROLOGY IP  CONSULT    Time Spent on this Encounter   I, Rachel Li, personally saw the patient today and spent less than or equal to 30 minutes discharging this patient.    Discharge Orders     Reason for your hospital stay   You were admitted to the hospital due to bleeding from the fistula site.     Follow-up and recommended labs and tests    Follow up with primary care provider, Aida Thomas, within 7 days for hospital follow- up.  The following labs/tests are recommended: hgb.     Activity   Your activity upon discharge: activity as tolerated     Full Code     Diet   Follow this diet upon discharge: Orders Placed This Encounter     Combination Diet Dialysis Diet; 4773-7508 Calories: Moderate Consistent CHO (4-6 CHO units/meal)       Discharge Medications   Current Discharge Medication List      CONTINUE these medications which have NOT CHANGED    Details   isosorbide mononitrate (IMDUR) 30 MG 24 hr tablet Take 3 tablets (90 mg) by mouth every evening  Qty: 270 tablet, Refills: 1    Associated Diagnoses: Coronary artery disease involving native heart, angina presence unspecified, unspecified vessel or lesion type; Hypertension secondary to other renal disorders      B COMPLEX-C-FOLIC ACID PO       insulin glargine (LANTUS) 100 UNIT/ML injection Inject 36 Units Subcutaneous every morning    Associated Diagnoses: Type 2 diabetes mellitus with chronic kidney disease on chronic dialysis, unspecified long term insulin use status (H)      !! amLODIPine (NORVASC) 2.5 MG tablet Take 1 tablet (2.5 mg) by mouth daily  Qty: 30 tablet, Refills: 0    Associated Diagnoses: Hypertension secondary to other renal disorders      !! amLODIPine (NORVASC) 5 MG tablet Take 5 mg by mouth daily as needed (only uses when blood pressure greater than 150-160)      HYDRALAZINE HCL PO Take 25 mg by mouth 2 times daily as needed for high blood pressure      albuterol (PROAIR HFA/PROVENTIL HFA/VENTOLIN HFA) 108 (90 BASE) MCG/ACT Inhaler Inhale 2  puffs into the lungs every 6 hours as needed for shortness of breath / dyspnea or wheezing  Qty: 1 Inhaler, Refills: 1    Associated Diagnoses: Cough      Nutritional Supplements (NEPRO PO) 1-3 times daily      omeprazole (PRILOSEC) 40 MG capsule Take 40 mg by mouth daily 1 hour before dinner      insulin lispro (HUMALOG KWIKPEN) 100 UNIT/ML injection Inject 5 Units Subcutaneous 3 times daily (before meals)    Associated Diagnoses: Type 2 diabetes mellitus with chronic kidney disease on chronic dialysis, unspecified long term insulin use status (H)      mycophenolate (CELLCEPT - GENERIC EQUIVALENT) 500 MG tablet Take 500 mg by mouth 2 times daily On an empty stomach      losartan (COZAAR) 100 MG tablet Take 1 tablet (100 mg) by mouth At Bedtime  Qty: 30 tablet, Refills: 0    Associated Diagnoses: Hypertension secondary to other renal disorders      !! gabapentin (NEURONTIN) 300 MG capsule Take 600 mg by mouth every evening      imiquimod (ALDARA) 5 % cream Apply topically as needed      DOXERCALCIFEROL IV Inject 6 mcg into the vein three times a week (with dialysis)      CLONAZEPAM PO Take 0.5 mg by mouth nightly as needed for anxiety (restless legs)      CLOPIDOGREL BISULFATE PO Take 75 mg by mouth every evening       abacavir (ZIAGEN) 300 MG tablet Take 600 mg by mouth every evening       chlorhexidine (CHLORHEXIDINE) 0.12 % solution Rinse and gargle 15 ml by mouth twice a day as directed.      terbinafine (LAMISIL AT) 1 % cream Apply topically 2 times daily as needed       atorvastatin (LIPITOR) 40 MG tablet Take 40 mg by mouth At Bedtime      epoetin brittni (EPOGEN,PROCRIT) 28428 UNIT/ML injection Inject 11,000 Units Subcutaneous three times a week WITH DIALYSIS      iron sucrose (VENOFER) 20 MG/ML injection Inject 50 mg into the vein once a week WIth dialysis      MAGNESIUM OXIDE PO Take 400 mg by mouth At Bedtime      dolutegravir (TIVICAY) 50 MG tablet Take 50 mg by mouth At Bedtime      nitroglycerin  "(NITROSTAT) 0.4 MG SL tablet One tablet under the tongue every 5 minutes if needed for chest pain. May repeat every 5 minutes for a maximum of 3 doses in 15 minutes\"  Qty: 25 tablet, Refills: 3    Associated Diagnoses: Coronary artery disease due to lipid rich plaque; Status post coronary angioplasty      !! gabapentin (NEURONTIN) 300 MG capsule Take 300 mg by mouth every morning       dapsone 100 MG tablet Take 100 mg by mouth At Bedtime       Darunavir Ethanolate (PREZISTA PO) Take 800 mg by mouth At Bedtime.      ritonavir (NORVIR) 100 MG capsule Take 1 capsule by mouth At Bedtime       order for DME Equipment being ordered: Other: 4WW  Treatment Diagnosis: Decreased activity tolerance  Qty: 1 each, Refills: 0    Associated Diagnoses: SOB (shortness of breath); Generalized muscle weakness       !! - Potential duplicate medications found. Please discuss with provider.        Allergies   Allergies   Allergen Reactions     Calcium Acetate Other (See Comments)     Other reaction(s): Other, see comments  Pain in chest and back  Pain in chest area (sensitive skin)      Diagnostic X-Ray Materials Other (See Comments)     PN: renal failure     Lisinopril      Sulfa Drugs      Data   Most Recent 3 CBC's:  Recent Labs   Lab Test  09/20/17   1211  09/19/17   1309  08/05/17   2334   WBC  4.3  5.8  4.2   HGB  9.4*  9.8*  9.4*   MCV  88  94  91   PLT  155  160  135*      Most Recent 3 BMP's:  Recent Labs   Lab Test  09/20/17   1211  09/19/17   1309  08/05/17   2334   NA  132*  130*  131*   POTASSIUM  4.5  3.8  4.7   CHLORIDE  93*  93*  92*   CO2  28  29  30   BUN  50*  35*  39*   CR  8.12*  5.53*  5.55*   ANIONGAP  11  8  9   PRABHA  9.3  9.1  9.0   GLC  129*  151*  471*     Most Recent 2 LFT's:  Recent Labs   Lab Test  09/20/17   1211  08/09/17   0818   04/15/17   0819   AST  23   --    --   20   ALT  23  22   < >  22   ALKPHOS  99   --    --   77   BILITOTAL  0.6   --    --   0.4    < > = values in this interval not displayed. "     Most Recent INR's and Anticoagulation Dosing History:  Anticoagulation Dose History     Recent Dosing and Labs Latest Ref Rng & Units 5/2/2016 5/19/2016 8/15/2016 1/8/2017 4/15/2017 9/19/2017 9/20/2017    INR 0.86 - 1.14 1.00 1.08 0.99 1.41(H) 1.09 0.94 1.01        Most Recent 3 Troponin's:  Recent Labs   Lab Test  08/05/17   2334  06/07/17   1531  04/16/17   0555  04/15/17   0820   05/19/16   0951   TROPI  0.022  0.016  <0.015  The 99th percentile for upper reference range is 0.045 ug/L.  Troponin values in   the range of 0.045 - 0.120 ug/L may be associated with risks of adverse   clinical events.     --    < >   --    TROPONIN   --    --    --   0.02   --   0.02    < > = values in this interval not displayed.     Most Recent Cholesterol Panel:  Recent Labs   Lab Test  08/09/17   0818   CHOL  125   LDL  30   HDL  33*   TRIG  311*     Most Recent 6 Bacteria Isolates From Any Culture (See EPIC Reports for Culture Details):  Recent Labs   Lab Test  03/30/17   0800  03/29/17   0131  03/28/17   2044  03/28/17 2015 03/28/17   1953  01/07/17   1150   CULT  Light growth Normal subhash  Canceled, Test credited >10 Squamous epithelial cells/low power field indicates   oral contamination. Please recollect. Notification of test cancellation was   given to Jennifer Pacheco RN [id number] 03.29.17 CF  *  10,000 to 50,000 colonies/mL mixed urogenital subhash  Susceptibility testing not routinely done    No growth  No growth  No growth  Incorrectly ordered by PCU/Clinic Canceled, Test credited ordered sputum culture   instead.       Most Recent TSH, T4 and A1c Labs:  Recent Labs   Lab Test  09/20/17   1211  04/16/17   0555   TSH   --   1.43   A1C  4.9   --      Results for orders placed or performed during the hospital encounter of 09/19/17   US Ext Arterial Venous Dialys Acs Graft    Narrative    ULTRASOUND EXTREMITY ARTERIAL VENOUS DIALYSIS ACCESS GRAFT   9/19/2017  2:59 PM     HISTORY: Prolonged bleeding after  dialysis.    COMPARISON: 3/9/2016.    TECHNIQUE: Color Doppler and spectral waveform analysis performed.    FINDINGS: There is a left brachiocephalic upper arm dialysis fistula  that is patent. Outflow volume was not calculated. At the level of  proximal humerus, there is an area of narrowing at 4.8 mm with  elevated velocity measuring up to 825 cm/s. There is a stent in the  cephalic vein near the shoulder that remains patent.      Impression    IMPRESSION: Prolonged bleeding after dialysis likely related to  high-grade stenosis in the cephalic vein at the level of the proximal  humerus. Consider fistulogram with angioplasty.    ARMIDA MAYER MD

## 2017-09-20 NOTE — H&P
"[facility]    History and Physical  Hospitalist       Date of Admission:  9/19/2017    Assessment & Plan     Summary: Donald Raza is a 67 year old male with a history of AIDS, ESRD on HEMODIALYSIS, dm2, hypertension, hyperlipidemia, and CAD who was admitted on 9/19/2017 with bleeding from dialysis shunt.    Dialysis AV fistula malfunction (H)  Anemia due to chronic kidney disease with contributions from acute blood loss anemia  ESRD (end stage renal disease) on dialysis (H)  ESRD on TTS HEMODIALYSIS. Access is through left AV fistula. Since this AM has had uncontrollable bleeding from access site after accessing for dialysis. He is on plavix. Bleeding appears to have stopped with direct pressure. US at [facility] showed stenosis of left cephalic vein. Vascular surgery recommends fistulogram.  - obs admit for now  - nephrology consult for routine dialysis  - vascular surgery consult  - with direct pressure, there is no further bleeding  - will next recheck hgb in AM  - type and screen in case transfusion is needed  - further imaging and treatment per vascular surgery    Type 2 diabetes mellitus with ESRD (H)  Is on lantus 36 units in AM and aspart 5 units tid.  Last true a1c 6 in 1/3/17. Suspect < 3.5 in 3/29/17 a lab error.  - continue home regimen with sliding scale on top  - continue home gabapentin    Human immunodeficiency virus (HIV) disease (HCC)  AIDS (acquired immune deficiency syndrome) (H)  9/6/17 labs: CD4 149 and viral load undetectable. On complex 5 med regimen with abacavir, dolutegravir, darunavir, ritonavir, and mycophenolate. On dapsone for prophylaxis.  - continue home meds    Hypertension  - continue home amlodipine 2.5 mg daily, losartan 100 mg daily, imdur 90 mg qhs, and hydralazine prn on top    Mixed hyperlipidemia  Coronary artery disease  Per cardiology note from 5/15/17: \"CAD with several non-STEMIs with last one in 08/2016 when he underwent diagonal in-stent restenosis with " "drug eluting PCI with Xience Alpine stent\"  Off aspirin and on plavix monotherapy. LVEF normal.  - continue atorvastatin 40 mg daily, and imdur as above  - hold plavix due to bleeding      FEN: diabetic dialysis  GI Prophylaxis: home PPI  Isolation: no  Central Lines: no  Christie: no  Restraints: no  DVT Prophylaxis: Pneumatic Compression Devices  Code Status: FULL, discussed with patient    Disposition: Expected discharge in 1-2 days once dialysis access issues resolved.    Nas Jara MD    Primary Care Physician   Aida Thomas    Chief Complaint   Bleeding    History is obtained from the patient    History of Present Illness   Donald Raza is a 67 year old male with a history of AIDS, ESRD on HEMODIALYSIS, dm2, hypertension, hyperlipidemia, and CAD who presented to [facility] ED on 9/19/2017 with bleeding from dialysis shunt.    Normally undergoes dialysis TTS and today during his session had malfunction of his shunt access. Had bleeding that was unable to be controlled at his dialysis center so he was transferred to [facility] ER for further evaluation.    Has a posterior headache for a few days, intermittent. Has chronic intermittent chest pain that seem to onset randomly. No fevers, chills, shortness of breath, cough, nausea, vomiting, abdominal pain, constipation, diarrhea, rash, speech changes, focal weakness, or dysuria. Rest of ROS negative. He is on plavix.    In the ED, initially there was an attempt to hold pressure but despite prolonged pressure there was still pulsatile bleeding that resulted in staff exposure. A further 40 minutes of direct pressure was applied along with surgical gelfoam. Case was then discussed with Dr. Thompson of vascular surgery at [facility] who recommended venous ultrasound which showed severe stenosis of cephalic vein. Patient was then transferred to [facility] for fistulogram vs other intervention.      Past Medical History    I have reviewed this patient's medical history " and updated it with pertinent information if needed.   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, recurrent     Bradycardia 5/28/2016     CAD S/P percutaneous coronary angioplasty 6/15/2015     Chest pain      CKD (chronic kidney disease)     Hemodialysis     Dilated aortic root (H) 5/6/2016     ESRD (end stage renal disease) on dialysis (H) 5/6/2016     Hemodialysis-associated hypotension 5/22/2016     Human immunodeficiency virus (HIV) disease (H)      Hypertension 2010     Hypotension, unspecified hypotension type 5/22/2016     Impotence of organic origin      Increased prostate specific antigen (PSA) velocity 08/08/2016    Awaiting bx on blood thinner     Mixed hyperlipidemia      Near syncope 2016    with hemodialysis     NSTEMI (non-ST elevated myocardial infarction) (H) 12/2015, 5/2016     Pulmonary HTN (H)     Mod     TIA (transient ischemic attack) 5/2016     Type 2 diabetes mellitus (H) age 50     Unstable angina (H) 3/4/2016       Past Surgical History   I have reviewed this patient's surgical history and updated it with pertinent information if needed.  Past Surgical History:   Procedure Laterality Date     ANGIOGRAM  03-04-16    No culprit lesions, stents widely patent      ANGIOGRAM  05-06-16    Cutting balloon ptca=Diag     APPENDECTOMY  2000     CHOLECYSTECTOMY, LAPOROSCOPIC       COLOSTOMY  09/30/1999    Temporary for diverticulitis     HEART CATH, ANGIOPLASTY  08-18-16    LAD PCI. Stented with a 3.0 x 8 mm Xience Alpine stent.     STENT, CORONARY, S660 15/18  12/2015    VANITA=Diag, PTCA=LAD     STENT, CORONARY, S660 15/18  06/2015    VANITA=LAD       Prior to Admission Medications   Prior to Admission Medications   Prescriptions Last Dose Informant Patient Reported? Taking?   B COMPLEX-C-FOLIC ACID PO   Yes No   CLONAZEPAM PO  Self Yes No    Sig: Take 0.5 mg by mouth nightly as needed for anxiety (restless legs)   CLOPIDOGREL BISULFATE PO  Self Yes No   Sig: Take 75 mg by mouth every evening    DOXERCALCIFEROL IV   Yes No   Sig: Inject 6 mcg into the vein three times a week (with dialysis)   Darunavir Ethanolate (PREZISTA PO)  Self Yes No   Sig: Take 800 mg by mouth At Bedtime.   HYDRALAZINE HCL PO   Yes No   Sig: Take 25 mg by mouth 2 times daily as needed for high blood pressure   MAGNESIUM OXIDE PO  Self Yes No   Sig: Take 400 mg by mouth At Bedtime   Nutritional Supplements (NEPRO PO)   Yes No   Si-3 times daily   abacavir (ZIAGEN) 300 MG tablet  Self Yes No   Sig: Take 600 mg by mouth every evening    albuterol (PROAIR HFA/PROVENTIL HFA/VENTOLIN HFA) 108 (90 BASE) MCG/ACT Inhaler   No No   Sig: Inhale 2 puffs into the lungs every 6 hours as needed for shortness of breath / dyspnea or wheezing   amLODIPine (NORVASC) 2.5 MG tablet   No No   Sig: Take 1 tablet (2.5 mg) by mouth daily   amLODIPine (NORVASC) 5 MG tablet   Yes No   Sig: Take 5 mg by mouth daily as needed (only uses when blood pressure greater than 150-160)   atorvastatin (LIPITOR) 40 MG tablet  Self Yes No   Sig: Take 40 mg by mouth At Bedtime   chlorhexidine (CHLORHEXIDINE) 0.12 % solution  Self Yes No   Sig: Rinse and gargle 15 ml by mouth twice a day as directed.   dapsone 100 MG tablet  Self Yes No   Sig: Take 100 mg by mouth At Bedtime    dolutegravir (TIVICAY) 50 MG tablet  Self Yes No   Sig: Take 50 mg by mouth At Bedtime   epoetin brittni (EPOGEN,PROCRIT) 82170 UNIT/ML injection  Self Yes No   Sig: Inject 11,000 Units Subcutaneous three times a week WITH DIALYSIS   gabapentin (NEURONTIN) 300 MG capsule  Self Yes No   Sig: Take 300 mg by mouth every morning    gabapentin (NEURONTIN) 300 MG capsule   Yes No   Sig: Take 600 mg by mouth every evening   imiquimod (ALDARA) 5 % cream   Yes No   Sig: Apply topically as needed   insulin glargine (LANTUS) 100 UNIT/ML injection   No No  "  Sig: Inject 36 Units Subcutaneous every morning   insulin lispro (HUMALOG KWIKPEN) 100 UNIT/ML injection   No No   Sig: Inject 5 Units Subcutaneous 3 times daily (before meals)   iron sucrose (VENOFER) 20 MG/ML injection  Self Yes No   Sig: Inject 50 mg into the vein once a week WIth dialysis   isosorbide mononitrate (IMDUR) 30 MG 24 hr tablet   No No   Sig: Take 3 tablets (90 mg) by mouth every evening   losartan (COZAAR) 100 MG tablet   No No   Sig: Take 1 tablet (100 mg) by mouth At Bedtime   mycophenolate (CELLCEPT - GENERIC EQUIVALENT) 500 MG tablet   Yes No   Sig: Take 500 mg by mouth 2 times daily On an empty stomach   nitroglycerin (NITROSTAT) 0.4 MG SL tablet  Self No No   Sig: One tablet under the tongue every 5 minutes if needed for chest pain. May repeat every 5 minutes for a maximum of 3 doses in 15 minutes\"   omeprazole (PRILOSEC) 40 MG capsule   Yes No   Sig: Take 40 mg by mouth daily 1 hour before dinner   order for DME   No No   Sig: Equipment being ordered: Other: 4WW  Treatment Diagnosis: Decreased activity tolerance   ritonavir (NORVIR) 100 MG capsule  Self Yes No   Sig: Take 1 capsule by mouth At Bedtime    terbinafine (LAMISIL AT) 1 % cream  Self Yes No   Sig: Apply topically 2 times daily as needed       Facility-Administered Medications: None     Allergies   Allergies   Allergen Reactions     Calcium Acetate Other (See Comments)     Other reaction(s): Other, see comments  Pain in chest and back  Pain in chest area (sensitive skin)      Diagnostic X-Ray Materials Other (See Comments)     PN: renal failure     Lisinopril      Sulfa Drugs        Social History   I have reviewed this patient's social history and updated it with pertinent information if needed. Donald Guevaraido  reports that he has quit smoking. His smoking use included Cigarettes. He has never used smokeless tobacco. He reports that he does not drink alcohol or use illicit drugs.    Family History   I have reviewed this patient's " family history and updated it with pertinent information if needed.   Family History   Problem Relation Age of Onset     HEART DISEASE Brother 40     CABG     KIDNEY DISEASE Sister      Hypertension Sister      HEART DISEASE Brother      Dilated aorta       Review of Systems   The 10 point Review of Systems is negative other than noted in the HPI or here.    Physical Exam   Temp: 97.5  F (36.4  C) Temp src: Oral BP: 161/86 (Right Arm) Pulse: 73   Resp: 16 SpO2: 94 % O2 Device: None (Room air)    Vital Signs with Ranges  Temp:  [97.5  F (36.4  C)-97.6  F (36.4  C)] 97.5  F (36.4  C)  Pulse:  [73] 73  Heart Rate:  [76-78] 78  Resp:  [16-19] 16  BP: (161-187)/() 161/86  SpO2:  [93 %-97 %] 94 %  0 lbs 0 oz    Constitutional: awake, alert, no acute distress.  Head: Normocephalic, atraumatic  Eye: Anicteric, noninjected.  Mouth: Normal appearing teeth.  ENT: External ear normal. Neck soft and range of motion normal.  Cardiovascular: Normal rate. Regular rhythm. Normal heart sounds. No rubs or gallops. No murmurs.  Respiratory: Clear to auscultation bilaterally. No wheezing, rhonchi, or crackles.  Gastrointestinal: Soft. Nondistended. Nontender. No guarding or rebound. Normoactive bowel sounds.  MSK: Strength grossly normal bilaterally and able to sit up without assistance.  Peripheral: No pedal edema bilaterally. Left AV fistula with palpable thrill and clamp over what is presumed a bleeding vessel.  Skin: No rash.  Neuro: Speech normal. Following commands. Moving all extremities spontaneously. Grossly intact.    Data   Data reviewed today:   None          Recent Labs  Lab 09/19/17  1309   WBC 5.8   HGB 9.8*   MCV 94      INR 0.94   *   POTASSIUM 3.8   CHLORIDE 93*   CO2 29   BUN 35*   CR 5.53*   ANIONGAP 8   PRABHA 9.1   *       Imaging:  Recent Results (from the past 24 hour(s))   US Ext Arterial Venous Dialys Acs Graft    Narrative    ULTRASOUND EXTREMITY ARTERIAL VENOUS DIALYSIS ACCESS GRAFT    9/19/2017  2:59 PM     HISTORY: Prolonged bleeding after dialysis.    COMPARISON: 3/9/2016.    TECHNIQUE: Color Doppler and spectral waveform analysis performed.    FINDINGS: There is a left brachiocephalic upper arm dialysis fistula  that is patent. Outflow volume was not calculated. At the level of  proximal humerus, there is an area of narrowing at 4.8 mm with  elevated velocity measuring up to 825 cm/s. There is a stent in the  cephalic vein near the shoulder that remains patent.      Impression    IMPRESSION: Prolonged bleeding after dialysis likely related to  high-grade stenosis in the cephalic vein at the level of the proximal  humerus. Consider fistulogram with angioplasty.    ARMIDA MAYER MD

## 2017-09-20 NOTE — PLAN OF CARE
Problem: Goal Outcome Summary  Goal: Goal Outcome Summary  Outcome: No Change  A+Ox4, up with stand by + GB. Mod-carb diet . Limb alert due to fistula to LUE. Take BP on right arm only. Vascular assessed and removed manual clamp from LUE. Fistulagram tomorrow morning, NPO after midnight. Hgb 9.8 HIV positive on droplet/contact precautions. VSS, denies pain, will continue to monitor.

## 2017-09-20 NOTE — PROGRESS NOTES
Vascular Surgery Progress Note     Patient seen and evaluated at the bedside. No complaints overnight.  No bleeding. Fistula is intact. Clamp was removed yesterday.     Plan for fistulogram today of LUE brachiocephalic fistula.   Vascular Surgery will follow with further recommendations.     Gael Thompson MD   Vascular Surgery Fellow  Pager: [phone]

## 2017-09-20 NOTE — PROGRESS NOTES
Vascular Surgery Brief Consultation Note     Mrs. Raza is a 68 yo male with a history of DM II, HIV ,HTN, CAD/unstable angina vs. Multiple cardiac stents and ESRD on HD (T,R,Sat)  transferred from and [facility] for intractable bleeding from his left brachiocephalic fistula. Per ED physician patient lost 1 unit of blood. Clamp applied to the fistula to stop bleeding. Mr. Raza denies history of prolonged bleeding from his fistula after dialysis.  He claims he had bleeding 6-8 months ago.     /86  Pulse 73  Temp 97.5  F (36.4  C) (Oral)  Resp 16  SpO2 94%    Intake/Output Summary (Last 24 hours) at 09/19/17 2126  Last data filed at 09/19/17 2009   Gross per 24 hour   Intake                0 ml   Output              180 ml   Net             -180 ml       Exam: Pulsatile fistula with Annuersymal degeneration of cephalic outflow. No active bleeding. No ulceration. The left upper extremity is symmetrical to the right. No evidence of collateralization at the shoulder.     Assessment: Bleeding secondary to outflow obstruction. Duplex stenosis reveals stenosis of the  proximal cephalic vein with peak systolic velocities of 500+.    Plan:    1. Fistulogram in AM. Balloon angioplasty of stenosis should reduce transmural pressure in aneurysmal vein and reduce bleeding.  2. Patient may also require aneurysmorrhaphy. Will discuss with staff  3. NPO after midnight.   4. Full consultation to follow     Gael Thompson MD   Vascular Surgery Fellow  Pager: [phone]

## 2017-09-20 NOTE — PROGRESS NOTES
Interventional Radiology Intra-procedural Nursing Note    Patient Name: Donald Raza  Medical Record Number: [id number]  Today's Date: September 20, 2017    Start Time: 0813  End of procedure time: 0924  Procedure: fistologram lt arm with revascularization  Report given to: Gail SILVERMAN  Time pt departs:  0928    Other Notes: patient arrives to IR 2 accompanied by transport aide. Name, allergies and labs verified. MD bedside consenting for procedure. Patient transferred to table. Monitors applied and VS obtained. Resp regular and non labored on RA. Patient prepped and draped. Moderate sedation initiated. Time out performed with Dr Huynh.  Assessed blood sugar intraprocedure. BS result 77. Procedure completed without difficulty. Sheath removed and manual pressure held to puncture site. Hemostasis obtained at 0923. Site clean dry and intact. quickclot and tegaderm applied to site. Patient transferred back to room accompanied by transport aide. Report called to Gail SILVERMAN.     Joan Kim

## 2017-09-20 NOTE — PHARMACY-ADMISSION MEDICATION HISTORY
Admission medication history interview status for the 9/19/2017  admission is complete. See EPIC admission navigator for prior to admission medications     Medication history source reliability:Good    Actions taken by pharmacist (provider contacted, etc):None     Additional medication history information not noted on PTA med list :None    Medication reconciliation/reorder completed by provider prior to medication history? Yes    Time spent in this activity: 30 minutes    Prior to Admission medications    Medication Sig Last Dose Taking? Auth Provider   isosorbide mononitrate (IMDUR) 30 MG 24 hr tablet Take 3 tablets (90 mg) by mouth every evening 9/18/2017 at Unknown time Yes Arnoldo Diaz MD   B COMPLEX-C-FOLIC ACID PO  9/18/2017 at Unknown time Yes Reported, Patient   insulin glargine (LANTUS) 100 UNIT/ML injection Inject 36 Units Subcutaneous every morning  Patient taking differently: Inject 25 Units Subcutaneous At Bedtime  9/18/2017 at Unknown time Yes Clemencia Pal MD   amLODIPine (NORVASC) 2.5 MG tablet Take 1 tablet (2.5 mg) by mouth daily  Patient taking differently: Take 2.5 mg by mouth daily Takes on non-dialysis days 9/18/2017 at Unknown time Yes Clemencia Pal MD   amLODIPine (NORVASC) 5 MG tablet Take 5 mg by mouth daily as needed (only uses when blood pressure greater than 150-160) Past Week at Unknown time Yes Unknown, Entered By History   HYDRALAZINE HCL PO Take 25 mg by mouth 2 times daily as needed for high blood pressure 9/18/2017 at Unknown time Yes Unknown, Entered By History   albuterol (PROAIR HFA/PROVENTIL HFA/VENTOLIN HFA) 108 (90 BASE) MCG/ACT Inhaler Inhale 2 puffs into the lungs every 6 hours as needed for shortness of breath / dyspnea or wheezing Past Month at Unknown time Yes Nelson Worthy MD   Nutritional Supplements (NEPRO PO) 1-3 times daily 9/18/2017 at Unknown time Yes Unknown, Entered By History   omeprazole (PRILOSEC) 40 MG capsule Take 40 mg by mouth daily 1  hour before dinner 9/18/2017 at Unknown time Yes Unknown, Entered By History   insulin lispro (HUMALOG KWIKPEN) 100 UNIT/ML injection Inject 5 Units Subcutaneous 3 times daily (before meals) 9/19/2017 at Unknown time Yes Lorna Jhaveri MD   mycophenolate (CELLCEPT - GENERIC EQUIVALENT) 500 MG tablet Take 500 mg by mouth 2 times daily On an empty stomach 9/18/2017 at Unknown time Yes Reported, Patient   losartan (COZAAR) 100 MG tablet Take 1 tablet (100 mg) by mouth At Bedtime 9/18/2017 at Unknown time Yes Glenna Fernández MD   gabapentin (NEURONTIN) 300 MG capsule Take 600 mg by mouth every evening 9/18/2017 at Unknown time Yes Unknown, Entered By History   imiquimod (ALDARA) 5 % cream Apply topically as needed 9/18/2017 at Unknown time Yes Reported, Patient   DOXERCALCIFEROL IV Inject 6 mcg into the vein three times a week (with dialysis) 9/19/2017 at Unknown time Yes Reported, Patient   CLONAZEPAM PO Take 0.5 mg by mouth nightly as needed for anxiety (restless legs) Past Week at Unknown time Yes Unknown, Entered By History   CLOPIDOGREL BISULFATE PO Take 75 mg by mouth every evening  9/18/2017 at Unknown time Yes Unknown, Entered By History   abacavir (ZIAGEN) 300 MG tablet Take 600 mg by mouth every evening  9/18/2017 at Unknown time Yes Abstract, Provider   chlorhexidine (CHLORHEXIDINE) 0.12 % solution Rinse and gargle 15 ml by mouth twice a day as directed. 9/18/2017 at Unknown time Yes Abstract, Provider   terbinafine (LAMISIL AT) 1 % cream Apply topically 2 times daily as needed  9/18/2017 at Unknown time Yes Abstract, Provider   atorvastatin (LIPITOR) 40 MG tablet Take 40 mg by mouth At Bedtime 9/18/2017 at Unknown time Yes Unknown, Entered By History   epoetin brittni (EPOGEN,PROCRIT) 59492 UNIT/ML injection Inject 11,000 Units Subcutaneous three times a week WITH DIALYSIS 9/19/2017 at Unknown time Yes Unknown, Entered By History   iron sucrose (VENOFER) 20 MG/ML injection Inject 50 mg into the vein  "once a week WIth dialysis 9/16/2017 at Unknown time Yes Unknown, Entered By History   MAGNESIUM OXIDE PO Take 400 mg by mouth At Bedtime 9/18/2017 at Unknown time Yes Unknown, Entered By History   dolutegravir (TIVICAY) 50 MG tablet Take 50 mg by mouth At Bedtime 9/18/2017 at Unknown time Yes Unknown, Entered By History   nitroglycerin (NITROSTAT) 0.4 MG SL tablet One tablet under the tongue every 5 minutes if needed for chest pain. May repeat every 5 minutes for a maximum of 3 doses in 15 minutes\" Past Week at Unknown time Yes Lay Paz MD   gabapentin (NEURONTIN) 300 MG capsule Take 300 mg by mouth every morning  9/19/2017 at Unknown time Yes Unknown, Entered By History   dapsone 100 MG tablet Take 100 mg by mouth At Bedtime  9/18/2017 at Unknown time Yes Reported, Patient   Darunavir Ethanolate (PREZISTA PO) Take 800 mg by mouth At Bedtime. 9/18/2017 at Unknown time Yes Reported, Patient   ritonavir (NORVIR) 100 MG capsule Take 1 capsule by mouth At Bedtime  9/18/2017 at Unknown time Yes Reported, Patient   order for DME Equipment being ordered: Other: 4WW  Treatment Diagnosis: Decreased activity tolerance   Lorna Jhaveri MD     September 19, 2017  [facility].    "

## 2017-09-20 NOTE — CONSULTS
[facility] Surgery Consultation    Donald Raza MRN# [id number]   Age: 67 year old YOB: 1950     Date of Admission: 9/19/2017    Reason for consult: Bleeding from AV Fisutla       Requesting physician: Dr. Nas Jara MR       Level of consult: Consult, follow and place orders           Assessment and Plan:     Assessment:   Mr. Raza is a 68 yo male with multiple medical problems including diabetes mellitus I, HIV, CAD on hemodialysis T,R, Sat admitted for intractable bleeding from his left brachiocephalic fistula. Bleeding likely secondary to combination of outflow stenosis (high grade stenosis of cephalic vein) demonstrated on ultrasound and aneurysmal degenration of cephalic vein. Currently bleeding is controlled.       Plan:   1. Clamp removed. No active bleeding from fistula.   2. IR Fistulogram in AM with plan for balloon angioplasty.   3. Patient will likely require outpatient aneurysmorrhaphy if he chooses to follow with [facility] for fistula care vs. His primary vascular surgeon.   4. Thank you for involving us in the care of this patient.            Chief Complaint:     Bleeding from Left Upper Extremity AVF     History is obtained from the patient         History of Present Illness:          Mr. Raza is a 67 year old right handed [facility] male with a complex  medical history of of DM II, HIV ,HTN, CAD/unstable angina PCI with multiple cardiac stents and ESRD on HD (T,R,Sat)  transferred from and [facility] for intractable bleeding from his left brachiocephalic fistula. Per ED physician patient lost 1 unit of blood. Control of bleeding was ultimately controlled with pressure. Vascular Surgery was consulted for assistance and recommendations in control of bleeding.      Per interview Mr. Raza claims the fistula was placed approximately 4 years ago at an outside institution after a more distal attempt failed.  He denies history of prolonged bleeding from his fistula after dialysis,  increased pressures or problems with access.  He claims he had a similar episode of  bleeding 6-8 months ago. Mr. Raza was worked up with ultrasound that revealed high grade stenosis in the cephalic vein.           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, recurrent     Bradycardia 5/28/2016     CAD S/P percutaneous coronary angioplasty 6/15/2015     Chest pain      CKD (chronic kidney disease)     Hemodialysis     Dilated aortic root (H) 5/6/2016     ESRD (end stage renal disease) on dialysis (H) 5/6/2016     Hemodialysis-associated hypotension 5/22/2016     Human immunodeficiency virus (HIV) disease (H)      Hypertension 2010     Hypotension, unspecified hypotension type 5/22/2016     Impotence of organic origin      Increased prostate specific antigen (PSA) velocity 08/08/2016    Awaiting bx on blood thinner     Mixed hyperlipidemia      Near syncope 2016    with hemodialysis     NSTEMI (non-ST elevated myocardial infarction) (H) 12/2015, 5/2016     Pulmonary HTN (H)     Mod     TIA (transient ischemic attack) 5/2016     Type 2 diabetes mellitus (H) age 50     Unstable angina (H) 3/4/2016             Past Surgical History:     Past Surgical History:   Procedure Laterality Date     ANGIOGRAM  03-04-16    No culprit lesions, stents widely patent      ANGIOGRAM  05-06-16    Cutting balloon ptca=Diag     APPENDECTOMY  2000     CHOLECYSTECTOMY, LAPOROSCOPIC       COLOSTOMY  09/30/1999    Temporary for diverticulitis     HEART CATH, ANGIOPLASTY  08-18-16    LAD PCI. Stented with a 3.0 x 8 mm Xience Alpine stent.     STENT, CORONARY, S660 15/18  12/2015    VANITA=Diag, PTCA=LAD     STENT, CORONARY, S660 15/18  06/2015    VANITA=LAD             Social History:     Social History   Substance Use Topics     Smoking status: Former Smoker      Types: Cigarettes     Smokeless tobacco: Never Used     Alcohol use No             Allergies:     Allergies   Allergen Reactions     Calcium Acetate Other (See Comments)     Other reaction(s): Other, see comments  Pain in chest and back  Pain in chest area (sensitive skin)      Diagnostic X-Ray Materials Other (See Comments)     PN: renal failure     Lisinopril      Sulfa Drugs              Medications:     Prescriptions Prior to Admission   Medication Sig Dispense Refill Last Dose     isosorbide mononitrate (IMDUR) 30 MG 24 hr tablet Take 3 tablets (90 mg) by mouth every evening 270 tablet 1 9/18/2017 at Unknown time     B COMPLEX-C-FOLIC ACID PO    9/18/2017 at Unknown time     insulin glargine (LANTUS) 100 UNIT/ML injection Inject 36 Units Subcutaneous every morning (Patient taking differently: Inject 25 Units Subcutaneous At Bedtime )   9/18/2017 at Unknown time     amLODIPine (NORVASC) 2.5 MG tablet Take 1 tablet (2.5 mg) by mouth daily (Patient taking differently: Take 2.5 mg by mouth daily Takes on non-dialysis days) 30 tablet 0 9/18/2017 at Unknown time     amLODIPine (NORVASC) 5 MG tablet Take 5 mg by mouth daily as needed (only uses when blood pressure greater than 150-160)   Past Week at Unknown time     HYDRALAZINE HCL PO Take 25 mg by mouth 2 times daily as needed for high blood pressure   9/18/2017 at Unknown time     albuterol (PROAIR HFA/PROVENTIL HFA/VENTOLIN HFA) 108 (90 BASE) MCG/ACT Inhaler Inhale 2 puffs into the lungs every 6 hours as needed for shortness of breath / dyspnea or wheezing 1 Inhaler 1 Past Month at Unknown time     Nutritional Supplements (NEPRO PO) 1-3 times daily   9/18/2017 at Unknown time     omeprazole (PRILOSEC) 40 MG capsule Take 40 mg by mouth daily 1 hour before dinner   9/18/2017 at Unknown time     insulin lispro (HUMALOG KWIKPEN) 100 UNIT/ML injection Inject 5 Units Subcutaneous 3 times daily (before meals)   9/19/2017 at Unknown time     mycophenolate (CELLCEPT -  "GENERIC EQUIVALENT) 500 MG tablet Take 500 mg by mouth 2 times daily On an empty stomach   9/18/2017 at Unknown time     losartan (COZAAR) 100 MG tablet Take 1 tablet (100 mg) by mouth At Bedtime 30 tablet 0 9/18/2017 at Unknown time     gabapentin (NEURONTIN) 300 MG capsule Take 600 mg by mouth every evening   9/18/2017 at Unknown time     imiquimod (ALDARA) 5 % cream Apply topically as needed   9/18/2017 at Unknown time     DOXERCALCIFEROL IV Inject 6 mcg into the vein three times a week (with dialysis)   9/19/2017 at Unknown time     CLONAZEPAM PO Take 0.5 mg by mouth nightly as needed for anxiety (restless legs)   Past Week at Unknown time     CLOPIDOGREL BISULFATE PO Take 75 mg by mouth every evening    9/18/2017 at Unknown time     abacavir (ZIAGEN) 300 MG tablet Take 600 mg by mouth every evening    9/18/2017 at Unknown time     chlorhexidine (CHLORHEXIDINE) 0.12 % solution Rinse and gargle 15 ml by mouth twice a day as directed.   9/18/2017 at Unknown time     terbinafine (LAMISIL AT) 1 % cream Apply topically 2 times daily as needed    9/18/2017 at Unknown time     atorvastatin (LIPITOR) 40 MG tablet Take 40 mg by mouth At Bedtime   9/18/2017 at Unknown time     epoetin brittni (EPOGEN,PROCRIT) 87584 UNIT/ML injection Inject 11,000 Units Subcutaneous three times a week WITH DIALYSIS   9/19/2017 at Unknown time     iron sucrose (VENOFER) 20 MG/ML injection Inject 50 mg into the vein once a week WIth dialysis   9/16/2017 at Unknown time     MAGNESIUM OXIDE PO Take 400 mg by mouth At Bedtime   9/18/2017 at Unknown time     dolutegravir (TIVICAY) 50 MG tablet Take 50 mg by mouth At Bedtime   9/18/2017 at Unknown time     nitroglycerin (NITROSTAT) 0.4 MG SL tablet One tablet under the tongue every 5 minutes if needed for chest pain. May repeat every 5 minutes for a maximum of 3 doses in 15 minutes\" 25 tablet 3 Past Week at Unknown time     gabapentin (NEURONTIN) 300 MG capsule Take 300 mg by mouth every morning    " 9/19/2017 at Unknown time     dapsone 100 MG tablet Take 100 mg by mouth At Bedtime    9/18/2017 at Unknown time     Darunavir Ethanolate (PREZISTA PO) Take 800 mg by mouth At Bedtime.   9/18/2017 at Unknown time     ritonavir (NORVIR) 100 MG capsule Take 1 capsule by mouth At Bedtime    9/18/2017 at Unknown time     order for DME Equipment being ordered: Other: 4WW  Treatment Diagnosis: Decreased activity tolerance 1 each 0 Taking             Review of Systems:   C: NEGATIVE for fever, chills, change in weight  E/M: NEGATIVE for ear, mouth and throat problems  R: NEGATIVE for significant cough or SOB  CV: NEGATIVE for chest pain, palpitations or peripheral edema          Physical Exam (Resident / Clinician):   Vitals were reviewed  Temp: 97.5  F (36.4  C) Temp src: Oral BP: 161/86 (Right Arm) Pulse: 73   Resp: 16 SpO2: 94 % O2 Device: None (Room air)      Constitutional:   Elderly male awake, alert, cooperative, no apparent distress, and appears stated age     Neck:   Supple, symmetrical, trachea midline, no adenopathy, thyroid symmetric, not enlarged and no tenderness, skin normal     Lungs:   No increased work of breathing, good air exchange, clear to auscultation bilaterally, no crackles or wheezing     Cardiovascular:   Normal apical impulse, regular rate and rhythm, normal S1 and S2, no S3 or S4, and no murmur noted     Musculoskeletal:   Upper extremities are bilaterally symmetrical. Pulsatile brachiocephalic fistula with annuersymal degeneration of cephalic outflow. No active bleeding. No ulceration.  No evidence of collateralization at the shoulder. Radial pulses palpable. There is no redness, warmth, or swelling of the joints.  Full range of motion noted.  Motor strength is 5 out of 5 all extremities bilaterally.  Tone is normal.              Data:     Results for orders placed or performed during the hospital encounter of 09/19/17 (from the past 24 hour(s))   CBC with platelets differential   Result Value  Ref Range    WBC 5.8 4.0 - 11.0 10e9/L    RBC Count 3.50 (L) 4.4 - 5.9 10e12/L    Hemoglobin 9.8 (L) 13.3 - 17.7 g/dL    Hematocrit 32.8 (L) 40.0 - 53.0 %    MCV 94 78 - 100 fl    MCH 28.0 26.5 - 33.0 pg    MCHC 29.9 (L) 31.5 - 36.5 g/dL    RDW 18.3 (H) 10.0 - 15.0 %    Platelet Count 160 150 - 450 10e9/L    Diff Method Automated Method     % Neutrophils 72.5 %    % Lymphocytes 16.9 %    % Monocytes 7.6 %    % Eosinophils 1.4 %    % Basophils 0.9 %    % Immature Granulocytes 0.7 %    Nucleated RBCs 0 0 /100    Absolute Neutrophil 4.2 1.6 - 8.3 10e9/L    Absolute Lymphocytes 1.0 0.8 - 5.3 10e9/L    Absolute Monocytes 0.4 0.0 - 1.3 10e9/L    Absolute Eosinophils 0.1 0.0 - 0.7 10e9/L    Absolute Basophils 0.1 0.0 - 0.2 10e9/L    Abs Immature Granulocytes 0.0 0 - 0.4 10e9/L    Absolute Nucleated RBC 0.0    INR   Result Value Ref Range    INR 0.94 0.86 - 1.14   PTT   Result Value Ref Range    PTT 31 22 - 37 sec   Basic metabolic panel   Result Value Ref Range    Sodium 130 (L) 133 - 144 mmol/L    Potassium 3.8 3.4 - 5.3 mmol/L    Chloride 93 (L) 94 - 109 mmol/L    Carbon Dioxide 29 20 - 32 mmol/L    Anion Gap 8 3 - 14 mmol/L    Glucose 151 (H) 70 - 99 mg/dL    Urea Nitrogen 35 (H) 7 - 30 mg/dL    Creatinine 5.53 (H) 0.66 - 1.25 mg/dL    GFR Estimate 10 (L) >60 mL/min/1.7m2    GFR Estimate If Black 12 (L) >60 mL/min/1.7m2    Calcium 9.1 8.5 - 10.1 mg/dL   US Ext Arterial Venous Dialys Acs Graft    Narrative    ULTRASOUND EXTREMITY ARTERIAL VENOUS DIALYSIS ACCESS GRAFT   9/19/2017  2:59 PM     HISTORY: Prolonged bleeding after dialysis.    COMPARISON: 3/9/2016.    TECHNIQUE: Color Doppler and spectral waveform analysis performed.    FINDINGS: There is a left brachiocephalic upper arm dialysis fistula  that is patent. Outflow volume was not calculated. At the level of  proximal humerus, there is an area of narrowing at 4.8 mm with  elevated velocity measuring up to 825 cm/s. There is a stent in the  cephalic vein near the  shoulder that remains patent.      Impression    IMPRESSION: Prolonged bleeding after dialysis likely related to  high-grade stenosis in the cephalic vein at the level of the proximal  humerus. Consider fistulogram with angioplasty.    ARMIDA MAYER MD   Glucose by meter   Result Value Ref Range    Glucose 137 (H) 70 - 99 mg/dL   Gael Thompson MD   Vascular Surgery Fellow  Pager: [phone]

## 2017-09-20 NOTE — PLAN OF CARE
Problem: Goal Outcome Summary  Goal: Goal Outcome Summary  Outcome: Improving  No bleed to left arm fistula site before or after fistulogram procedure.  Hgb stable.  Tolerating diet.  Up with SBA/gait belt, walked to bathroom and arciniega with steady gait; states has cane at home to use and states has spouse at home.  He does c/o of some generalized weakness.  He will be discharged home today.  He states he has dialysis tomorrow on his usual schedule.

## 2017-10-10 ENCOUNTER — APPOINTMENT (OUTPATIENT)
Dept: GENERAL RADIOLOGY | Facility: CLINIC | Age: 67
DRG: 286 | End: 2017-10-10
Attending: INTERNAL MEDICINE
Payer: MEDICARE

## 2017-10-10 ENCOUNTER — HOSPITAL ENCOUNTER (INPATIENT)
Facility: CLINIC | Age: 67
LOS: 2 days | Discharge: HOME | DRG: 286 | End: 2017-10-12
Attending: EMERGENCY MEDICINE | Admitting: INTERNAL MEDICINE
Payer: MEDICARE

## 2017-10-10 DIAGNOSIS — E11.22 TYPE 2 DIABETES MELLITUS WITH CHRONIC KIDNEY DISEASE ON CHRONIC DIALYSIS, UNSPECIFIED LONG TERM INSULIN USE STATUS: Primary | Chronic | ICD-10-CM

## 2017-10-10 DIAGNOSIS — Z99.2 TYPE 2 DIABETES MELLITUS WITH CHRONIC KIDNEY DISEASE ON CHRONIC DIALYSIS, UNSPECIFIED LONG TERM INSULIN USE STATUS: Primary | Chronic | ICD-10-CM

## 2017-10-10 DIAGNOSIS — R07.9 ACUTE CHEST PAIN: ICD-10-CM

## 2017-10-10 DIAGNOSIS — Z86.79 HISTORY OF CORONARY ARTERY DISEASE: ICD-10-CM

## 2017-10-10 DIAGNOSIS — N18.6 TYPE 2 DIABETES MELLITUS WITH CHRONIC KIDNEY DISEASE ON CHRONIC DIALYSIS, UNSPECIFIED LONG TERM INSULIN USE STATUS: Primary | Chronic | ICD-10-CM

## 2017-10-10 PROBLEM — I24.9 ACS (ACUTE CORONARY SYNDROME) (H): Status: ACTIVE | Noted: 2017-10-10

## 2017-10-10 LAB
ANION GAP SERPL CALCULATED.3IONS-SCNC: 9 MMOL/L (ref 3–14)
BASOPHILS # BLD AUTO: 0 10E9/L (ref 0–0.2)
BASOPHILS NFR BLD AUTO: 0.5 %
BUN SERPL-MCNC: 29 MG/DL (ref 7–30)
CALCIUM SERPL-MCNC: 9.2 MG/DL (ref 8.5–10.1)
CHLORIDE SERPL-SCNC: 93 MMOL/L (ref 94–109)
CO2 SERPL-SCNC: 31 MMOL/L (ref 20–32)
CREAT SERPL-MCNC: 6.14 MG/DL (ref 0.66–1.25)
DIFFERENTIAL METHOD BLD: ABNORMAL
EOSINOPHIL # BLD AUTO: 0.1 10E9/L (ref 0–0.7)
EOSINOPHIL NFR BLD AUTO: 2.2 %
ERYTHROCYTE [DISTWIDTH] IN BLOOD BY AUTOMATED COUNT: 17.3 % (ref 10–15)
GFR SERPL CREATININE-BSD FRML MDRD: 9 ML/MIN/1.7M2
GLUCOSE SERPL-MCNC: 235 MG/DL (ref 70–99)
HCT VFR BLD AUTO: 28.2 % (ref 40–53)
HGB BLD-MCNC: 8.9 G/DL (ref 13.3–17.7)
IMM GRANULOCYTES # BLD: 0 10E9/L (ref 0–0.4)
IMM GRANULOCYTES NFR BLD: 0.3 %
INR PPP: 0.95 (ref 0.86–1.14)
INTERPRETATION ECG - MUSE: NORMAL
LYMPHOCYTES # BLD AUTO: 1.2 10E9/L (ref 0.8–5.3)
LYMPHOCYTES NFR BLD AUTO: 19.2 %
MAGNESIUM SERPL-MCNC: 1.9 MG/DL (ref 1.6–2.3)
MCH RBC QN AUTO: 27.6 PG (ref 26.5–33)
MCHC RBC AUTO-ENTMCNC: 31.6 G/DL (ref 31.5–36.5)
MCV RBC AUTO: 88 FL (ref 78–100)
MONOCYTES # BLD AUTO: 0.5 10E9/L (ref 0–1.3)
MONOCYTES NFR BLD AUTO: 7.5 %
NEUTROPHILS # BLD AUTO: 4.4 10E9/L (ref 1.6–8.3)
NEUTROPHILS NFR BLD AUTO: 70.3 %
NRBC # BLD AUTO: 0 10*3/UL
NRBC BLD AUTO-RTO: 0 /100
NT-PROBNP SERPL-MCNC: 6075 PG/ML (ref 0–900)
PLATELET # BLD AUTO: 143 10E9/L (ref 150–450)
POTASSIUM SERPL-SCNC: 4.1 MMOL/L (ref 3.4–5.3)
RBC # BLD AUTO: 3.22 10E12/L (ref 4.4–5.9)
SODIUM SERPL-SCNC: 133 MMOL/L (ref 133–144)
TROPONIN I SERPL-MCNC: 0.02 UG/L (ref 0–0.04)
TROPONIN I SERPL-MCNC: 0.02 UG/L (ref 0–0.04)
TSH SERPL DL<=0.005 MIU/L-ACNC: 2.19 MU/L (ref 0.4–4)
WBC # BLD AUTO: 6.2 10E9/L (ref 4–11)

## 2017-10-10 PROCEDURE — 25000125 ZZHC RX 250: Performed by: EMERGENCY MEDICINE

## 2017-10-10 PROCEDURE — A9270 NON-COVERED ITEM OR SERVICE: HCPCS | Mod: GY | Performed by: INTERNAL MEDICINE

## 2017-10-10 PROCEDURE — 25000128 H RX IP 250 OP 636

## 2017-10-10 PROCEDURE — 96375 TX/PRO/DX INJ NEW DRUG ADDON: CPT

## 2017-10-10 PROCEDURE — 21000000 ZZH R&B IMCU HEART CARE

## 2017-10-10 PROCEDURE — 25000132 ZZH RX MED GY IP 250 OP 250 PS 637: Mod: GY | Performed by: INTERNAL MEDICINE

## 2017-10-10 PROCEDURE — 25000132 ZZH RX MED GY IP 250 OP 250 PS 637: Mod: GY | Performed by: EMERGENCY MEDICINE

## 2017-10-10 PROCEDURE — 83880 ASSAY OF NATRIURETIC PEPTIDE: CPT | Performed by: INTERNAL MEDICINE

## 2017-10-10 PROCEDURE — 84484 ASSAY OF TROPONIN QUANT: CPT | Performed by: EMERGENCY MEDICINE

## 2017-10-10 PROCEDURE — 71020 XR CHEST 2 VW: CPT

## 2017-10-10 PROCEDURE — 99285 EMERGENCY DEPT VISIT HI MDM: CPT | Mod: 25

## 2017-10-10 PROCEDURE — 85610 PROTHROMBIN TIME: CPT | Performed by: INTERNAL MEDICINE

## 2017-10-10 PROCEDURE — 83735 ASSAY OF MAGNESIUM: CPT | Performed by: INTERNAL MEDICINE

## 2017-10-10 PROCEDURE — 93005 ELECTROCARDIOGRAM TRACING: CPT

## 2017-10-10 PROCEDURE — 99222 1ST HOSP IP/OBS MODERATE 55: CPT | Mod: AI | Performed by: INTERNAL MEDICINE

## 2017-10-10 PROCEDURE — 85025 COMPLETE CBC W/AUTO DIFF WBC: CPT | Performed by: EMERGENCY MEDICINE

## 2017-10-10 PROCEDURE — 96365 THER/PROPH/DIAG IV INF INIT: CPT

## 2017-10-10 PROCEDURE — 80048 BASIC METABOLIC PNL TOTAL CA: CPT | Performed by: EMERGENCY MEDICINE

## 2017-10-10 PROCEDURE — 84484 ASSAY OF TROPONIN QUANT: CPT | Performed by: INTERNAL MEDICINE

## 2017-10-10 PROCEDURE — A9270 NON-COVERED ITEM OR SERVICE: HCPCS | Mod: GY | Performed by: EMERGENCY MEDICINE

## 2017-10-10 PROCEDURE — 36415 COLL VENOUS BLD VENIPUNCTURE: CPT | Performed by: INTERNAL MEDICINE

## 2017-10-10 PROCEDURE — 84443 ASSAY THYROID STIM HORMONE: CPT | Performed by: INTERNAL MEDICINE

## 2017-10-10 RX ORDER — BISACODYL 10 MG
10 SUPPOSITORY, RECTAL RECTAL DAILY PRN
Status: DISCONTINUED | OUTPATIENT
Start: 2017-10-10 | End: 2017-10-12 | Stop reason: HOSPADM

## 2017-10-10 RX ORDER — ASPIRIN 81 MG/1
324 TABLET, CHEWABLE ORAL ONCE
Status: COMPLETED | OUTPATIENT
Start: 2017-10-10 | End: 2017-10-10

## 2017-10-10 RX ORDER — ASPIRIN 325 MG
325 TABLET ORAL DAILY
Status: DISCONTINUED | OUTPATIENT
Start: 2017-10-11 | End: 2017-10-11 | Stop reason: DRUGHIGH

## 2017-10-10 RX ORDER — CHLORHEXIDINE GLUCONATE ORAL RINSE 1.2 MG/ML
15 SOLUTION DENTAL 2 TIMES DAILY
Status: DISCONTINUED | OUTPATIENT
Start: 2017-10-10 | End: 2017-10-12 | Stop reason: HOSPADM

## 2017-10-10 RX ORDER — CLONAZEPAM 0.5 MG/1
0.5 TABLET ORAL
Status: DISCONTINUED | OUTPATIENT
Start: 2017-10-10 | End: 2017-10-12 | Stop reason: HOSPADM

## 2017-10-10 RX ORDER — LIDOCAINE 40 MG/G
CREAM TOPICAL
Status: DISCONTINUED | OUTPATIENT
Start: 2017-10-10 | End: 2017-10-12 | Stop reason: HOSPADM

## 2017-10-10 RX ORDER — ACETAMINOPHEN 650 MG/1
650 SUPPOSITORY RECTAL EVERY 4 HOURS PRN
Status: DISCONTINUED | OUTPATIENT
Start: 2017-10-10 | End: 2017-10-12 | Stop reason: HOSPADM

## 2017-10-10 RX ORDER — MORPHINE SULFATE 2 MG/ML
1 INJECTION, SOLUTION INTRAMUSCULAR; INTRAVENOUS
Status: DISCONTINUED | OUTPATIENT
Start: 2017-10-10 | End: 2017-10-12

## 2017-10-10 RX ORDER — ONDANSETRON 4 MG/1
4 TABLET, ORALLY DISINTEGRATING ORAL EVERY 6 HOURS PRN
Status: DISCONTINUED | OUTPATIENT
Start: 2017-10-10 | End: 2017-10-12 | Stop reason: HOSPADM

## 2017-10-10 RX ORDER — MAGNESIUM OXIDE 400 MG/1
400 TABLET ORAL AT BEDTIME
Status: DISCONTINUED | OUTPATIENT
Start: 2017-10-10 | End: 2017-10-12 | Stop reason: HOSPADM

## 2017-10-10 RX ORDER — RITONAVIR 100 MG/1
100 TABLET ORAL AT BEDTIME
Status: DISCONTINUED | OUTPATIENT
Start: 2017-10-10 | End: 2017-10-12 | Stop reason: HOSPADM

## 2017-10-10 RX ORDER — ONDANSETRON 2 MG/ML
4 INJECTION INTRAMUSCULAR; INTRAVENOUS EVERY 6 HOURS PRN
Status: DISCONTINUED | OUTPATIENT
Start: 2017-10-10 | End: 2017-10-12 | Stop reason: HOSPADM

## 2017-10-10 RX ORDER — ATORVASTATIN CALCIUM 40 MG/1
40 TABLET, FILM COATED ORAL AT BEDTIME
Status: DISCONTINUED | OUTPATIENT
Start: 2017-10-10 | End: 2017-10-12 | Stop reason: HOSPADM

## 2017-10-10 RX ORDER — LOSARTAN POTASSIUM 100 MG/1
100 TABLET ORAL AT BEDTIME
Status: DISCONTINUED | OUTPATIENT
Start: 2017-10-10 | End: 2017-10-12 | Stop reason: HOSPADM

## 2017-10-10 RX ORDER — ACETAMINOPHEN 325 MG/1
650 TABLET ORAL EVERY 4 HOURS PRN
Status: DISCONTINUED | OUTPATIENT
Start: 2017-10-10 | End: 2017-10-12 | Stop reason: HOSPADM

## 2017-10-10 RX ORDER — NICOTINE POLACRILEX 4 MG
15-30 LOZENGE BUCCAL
Status: DISCONTINUED | OUTPATIENT
Start: 2017-10-10 | End: 2017-10-12 | Stop reason: HOSPADM

## 2017-10-10 RX ORDER — MORPHINE SULFATE 4 MG/ML
4 INJECTION, SOLUTION INTRAMUSCULAR; INTRAVENOUS ONCE
Status: DISCONTINUED | OUTPATIENT
Start: 2017-10-10 | End: 2017-10-12

## 2017-10-10 RX ORDER — NITROGLYCERIN 20 MG/100ML
.07-2 INJECTION INTRAVENOUS CONTINUOUS
Status: DISCONTINUED | OUTPATIENT
Start: 2017-10-10 | End: 2017-10-11

## 2017-10-10 RX ORDER — GABAPENTIN 300 MG/1
300 CAPSULE ORAL EVERY MORNING
Status: DISCONTINUED | OUTPATIENT
Start: 2017-10-11 | End: 2017-10-12 | Stop reason: HOSPADM

## 2017-10-10 RX ORDER — DAPSONE 100 MG/1
100 TABLET ORAL AT BEDTIME
Status: DISCONTINUED | OUTPATIENT
Start: 2017-10-10 | End: 2017-10-12 | Stop reason: HOSPADM

## 2017-10-10 RX ORDER — NALOXONE HYDROCHLORIDE 0.4 MG/ML
.1-.4 INJECTION, SOLUTION INTRAMUSCULAR; INTRAVENOUS; SUBCUTANEOUS
Status: DISCONTINUED | OUTPATIENT
Start: 2017-10-10 | End: 2017-10-11

## 2017-10-10 RX ORDER — CLOPIDOGREL BISULFATE 75 MG/1
75 TABLET ORAL EVERY EVENING
Status: DISCONTINUED | OUTPATIENT
Start: 2017-10-10 | End: 2017-10-12 | Stop reason: HOSPADM

## 2017-10-10 RX ORDER — ABACAVIR 300 MG/1
600 TABLET ORAL EVERY EVENING
Status: DISCONTINUED | OUTPATIENT
Start: 2017-10-10 | End: 2017-10-12 | Stop reason: HOSPADM

## 2017-10-10 RX ORDER — MYCOPHENOLATE MOFETIL 500 MG/1
500 TABLET ORAL 2 TIMES DAILY
Status: DISCONTINUED | OUTPATIENT
Start: 2017-10-10 | End: 2017-10-12 | Stop reason: HOSPADM

## 2017-10-10 RX ORDER — DEXTROSE MONOHYDRATE 25 G/50ML
25-50 INJECTION, SOLUTION INTRAVENOUS
Status: DISCONTINUED | OUTPATIENT
Start: 2017-10-10 | End: 2017-10-12 | Stop reason: HOSPADM

## 2017-10-10 RX ORDER — AMOXICILLIN 250 MG
1-2 CAPSULE ORAL 2 TIMES DAILY PRN
Status: DISCONTINUED | OUTPATIENT
Start: 2017-10-10 | End: 2017-10-12 | Stop reason: HOSPADM

## 2017-10-10 RX ORDER — GABAPENTIN 300 MG/1
600 CAPSULE ORAL EVERY EVENING
Status: DISCONTINUED | OUTPATIENT
Start: 2017-10-10 | End: 2017-10-12 | Stop reason: HOSPADM

## 2017-10-10 RX ORDER — NITROGLYCERIN 0.4 MG/1
0.4 TABLET SUBLINGUAL EVERY 5 MIN PRN
Status: DISCONTINUED | OUTPATIENT
Start: 2017-10-10 | End: 2017-10-12 | Stop reason: HOSPADM

## 2017-10-10 RX ORDER — NITROGLYCERIN 20 MG/100ML
0.07-2 INJECTION INTRAVENOUS CONTINUOUS
Status: DISCONTINUED | OUTPATIENT
Start: 2017-10-10 | End: 2017-10-10

## 2017-10-10 RX ORDER — NITROGLYCERIN 20 MG/100ML
INJECTION INTRAVENOUS
Status: COMPLETED
Start: 2017-10-10 | End: 2017-10-10

## 2017-10-10 RX ORDER — DOXERCALCIFEROL 4 UG/2ML
6 INJECTION INTRAVENOUS
Status: DISCONTINUED | OUTPATIENT
Start: 2017-10-12 | End: 2017-10-12 | Stop reason: HOSPADM

## 2017-10-10 RX ORDER — ALBUTEROL SULFATE 90 UG/1
2 AEROSOL, METERED RESPIRATORY (INHALATION) EVERY 6 HOURS PRN
Status: DISCONTINUED | OUTPATIENT
Start: 2017-10-10 | End: 2017-10-12 | Stop reason: HOSPADM

## 2017-10-10 RX ADMIN — NITROGLYCERIN 0.07 MCG/KG/MIN: 20 INJECTION INTRAVENOUS at 21:17

## 2017-10-10 RX ADMIN — GABAPENTIN 600 MG: 300 CAPSULE ORAL at 23:58

## 2017-10-10 RX ADMIN — OMEPRAZOLE 40 MG: 20 CAPSULE, DELAYED RELEASE ORAL at 23:56

## 2017-10-10 RX ADMIN — ABACAVIR 600 MG: 300 TABLET, FILM COATED ORAL at 23:56

## 2017-10-10 RX ADMIN — CLOPIDOGREL 75 MG: 75 TABLET, FILM COATED ORAL at 23:56

## 2017-10-10 RX ADMIN — LIDOCAINE HYDROCHLORIDE 30 ML: 20 SOLUTION ORAL; TOPICAL at 19:57

## 2017-10-10 RX ADMIN — LOSARTAN POTASSIUM 100 MG: 100 TABLET, FILM COATED ORAL at 23:58

## 2017-10-10 RX ADMIN — B-COMPLEX W/ C & FOLIC ACID TAB 1 TABLET: TAB at 23:58

## 2017-10-10 RX ADMIN — ASPIRIN 81 MG 324 MG: 81 TABLET ORAL at 23:57

## 2017-10-10 RX ADMIN — MAGNESIUM OXIDE TAB 400 MG (241.3 MG ELEMENTAL MG) 400 MG: 400 (241.3 MG) TAB at 23:57

## 2017-10-10 RX ADMIN — DAPSONE 100 MG: 100 TABLET ORAL at 23:58

## 2017-10-10 RX ADMIN — DOLUTEGRAVIR SODIUM 50 MG: 50 TABLET, FILM COATED ORAL at 23:58

## 2017-10-10 RX ADMIN — RITONAVIR 100 MG: 100 TABLET, FILM COATED ORAL at 23:58

## 2017-10-10 RX ADMIN — ATORVASTATIN CALCIUM 40 MG: 40 TABLET, FILM COATED ORAL at 23:57

## 2017-10-10 ASSESSMENT — ACTIVITIES OF DAILY LIVING (ADL)
COGNITION: 0 - NO COGNITION ISSUES REPORTED
RETIRED_COMMUNICATION: 0-->UNDERSTANDS/COMMUNICATES WITHOUT DIFFICULTY
RETIRED_EATING: 0-->INDEPENDENT
AMBULATION: 1-->ASSISTIVE EQUIPMENT
TRANSFERRING: 1-->ASSISTIVE EQUIPMENT
BATHING: 1-->ASSISTIVE EQUIPMENT
TOILETING: 1-->ASSISTIVE EQUIPMENT
DRESS: 0-->INDEPENDENT
FALL_HISTORY_WITHIN_LAST_SIX_MONTHS: NO
SWALLOWING: 0-->SWALLOWS FOODS/LIQUIDS WITHOUT DIFFICULTY

## 2017-10-10 ASSESSMENT — ENCOUNTER SYMPTOMS
SHORTNESS OF BREATH: 1
FEVER: 0
DIARRHEA: 0
LIGHT-HEADEDNESS: 1
NAUSEA: 0
ABDOMINAL PAIN: 0
VOMITING: 0

## 2017-10-10 ASSESSMENT — PAIN DESCRIPTION - DESCRIPTORS
DESCRIPTORS: PRESSURE

## 2017-10-10 NOTE — ED NOTES
Bed: ED28  Expected date:   Expected time:   Means of arrival:   Comments:  Bud 594 Chest pain 67 male

## 2017-10-10 NOTE — ED PROVIDER NOTES
History     Chief Complaint:  Chest Pain     HPI   Donald Raza is a 67 year old male with a history of coronary artery disease, end stage renal disease on hemodialysis 3 days a week, hypertension, hyperlipidemia, diabetes, and HIV who presents with chest pain. The patient has an extensive cardiovascular history notable for LAD stenting in June 2015, NSTEMI and stenting to an ostial diagonal vessel and kissing balloon angioplasty to his LAD later in 2015, another NSTEMI with cutting balloon angioplasty for in-stent restenosis in 2016, and stenting to his diagonal vessel after another NSTEMI in August 2016. Since then he has had multiple hospitalizations for chest pain.  He underwent a coronary angiography in 01/2017 showing patency of his stents. He has continued with Plavix. In April 2017, he underwent a CardioNet monitor after one of his hospitalizations.This showed sinus rhythm with occasional premature ventricular contractions and no significant arrhythmias or dysrhythmias. He has a history of labile hypertension. The patient had a stent placed to his fistula on 9/20 due to prolonged bleeding with hemodialysis. He recently saw his cardiologist on 8/11; intermittent chest and back discomfort could be due to coronary artery wheezes spasm. Recent stress test did not reveal any ischemia    The patient reports he got home from dialysis this afternoon and started to feel nauseous, so he laid down in bed. While he was laying in bed around 3pm, he developed pain in his back and left chest. He took 1 Nitro with complete relief of symptoms, but then the pain came back 30 minutes later. He took anything Nitro and the pain again went away, but then he called 911. The patient was brought to the ED by EMS for evaluation. He was given 4 Nitro but EMS. On arrival to the ED, the patient reports he still has chest pain that he rates at 5-6/10 currently. He describes pain in his back radiating to his left chest and states it  feels similar to his previous heart attacks. The patient has a little bit of breathing difficulty. He states he feels light-headed. The patient denies nausea, vomiting, or abdominal pain.     CARDIAC RISK FACTORS:  Sex:    Male  Tobacco:   Former smoker  Hypertension:   Yes  Hyperlipidemia:  Yes  Diabetes:   Yes  Family History:  Yes    Allergies:  Calcium acetate  Diagnostic x-ray materials  Lisinopril  Sulfa      Medications:    isosorbide mononitrate (IMDUR) 30 MG 24 hr tablet  B COMPLEX-C-FOLIC ACID PO  insulin glargine (LANTUS) 100 UNIT/ML injection  amLODIPine (NORVASC) 2.5 MG tablet  amLODIPine (NORVASC) 5 MG tablet  HYDRALAZINE HCL PO  albuterol (PROAIR HFA/PROVENTIL HFA/VENTOLIN HFA) 108 (90 BASE) MCG/ACT Inhaler  omeprazole (PRILOSEC) 40 MG capsule  insulin lispro (HUMALOG KWIKPEN) 100 UNIT/ML injection  mycophenolate (CELLCEPT - GENERIC EQUIVALENT) 500 MG tablet  losartan (COZAAR) 100 MG tablet  gabapentin (NEURONTIN) 300 MG capsule  DOXERCALCIFEROL IV  CLONAZEPAM PO  CLOPIDOGREL BISULFATE PO  abacavir (ZIAGEN) 300 MG tablet  atorvastatin (LIPITOR) 40 MG tablet  epoetin brittni (EPOGEN,PROCRIT) 89289 UNIT/ML injection  iron sucrose (VENOFER) 20 MG/ML injection  MAGNESIUM OXIDE PO  dolutegravir (TIVICAY) 50 MG tablet  nitroglycerin (NITROSTAT) 0.4 MG SL tablet  gabapentin (NEURONTIN) 300 MG capsule  dapsone 100 MG tablet  Darunavir Ethanolate (PREZISTA PO)  ritonavir (NORVIR) 100 MG capsule    Past Medical History:    Acute coronary syndrome  Coronary artery disease  Huang disease  Bradycardia  Chest pain  CKD  Dilated aortic root  ESRD on hemodialysis  HIV  Hypertension  Hyperlipidemia  Increased PSA  NSTEMI  Pulmonary hypertension  TIA  Type 2 diabetes  Unstable angina   Dialysis AV fistula malfunction    Past Surgical History:    Appendectomy  Cholecystectomy  Colostomy  Heart cath, angioplasy  Coronary stent x2  Angiogram, cutting balloon ptca  Stent to fistula     Family History:    Brother heart disease  "at age 40  Kidney disease  Hypertension    Social History:  Smoking status: Former smoker  Alcohol use: No  Marital Status:   [2]     Review of Systems   Constitutional: Negative for fever.   Respiratory: Positive for shortness of breath.    Cardiovascular: Positive for chest pain.   Gastrointestinal: Negative for abdominal pain, diarrhea, nausea and vomiting.   Neurological: Positive for light-headedness.   All other systems reviewed and are negative.      Physical Exam   Patient Vitals for the past 24 hrs:   BP Temp Temp src Heart Rate Resp SpO2 Height Weight   10/10/17 1944 - - - 71 8 93 % - -   10/10/17 1915 - - - 69 15 95 % - -   10/10/17 1904 - - - 73 14 97 % - -   10/10/17 1843 158/85 98.8  F (37.1  C) Oral 73 18 92 % 1.803 m (5' 11\") 90 kg (198 lb 6.6 oz)   10/10/17 1842 - - - 75 12 95 % - -       Physical Exam  Eye:  Pupils are equal, round, and reactive.  Extraocular movements intact.    ENT:  No rhinorrhea.  Moist mucus membranes.  Normal tongue and tonsil.    Cardiac:  Regular rate and rhythm.  No murmurs, gallops, or rubs.    Pulmonary:  Clear to auscultation bilaterally.  No wheezes, rales, or rhonchi.    Abdomen:  Positive bowel sounds.  Abdomen is soft and non-distended, without focal tenderness.    Musculoskeletal:  Normal movement of all extremities without evidence for deficit.    Skin:  Warm and dry without rashes.    Neurologic:  Non-focal exam without asymmetric weakness or numbness.     Psychiatric:  Normal affect with appropriate interaction with examiner.    Emergency Department Course   ECG (18:39:21):  Rate 75 bpm. ID interval 130. QRS duration 98. QT/QTc 408/455. P-R-T axes -19 7 49. Normal sinus rhythm. Cannot rule out anterior infarct, age undetermined. Abnormal ECG   Interpreted at 1841 by Trierweiler, Chad A, MD.    Laboratory:  Troponin: 0.019  CBC: HGB 8.9(L), (L), o/w WNL (WBC 6.2)   BMP: Chloride 93(L), Glucose 235(H), Creatinine 6.14(H), GFR 9(L), o/w WNL "     Interventions:  1957: GI Cocktail - Maalox 15 mL, Viscous Lidocaine 15 mL, 30 mL suspension PO    Emergency Department Course:  Past medical records, nursing notes, and vitals reviewed.  1847: I performed an exam of the patient and obtained history, as documented above.  IV inserted and blood drawn. The patient was placed on continuous cardiac monitoring and pulse oximetry.  ECG obtained, results above.     1945: I rechecked the patient. Explained findings to the patient.    1957: I spoke to Dr. Kumari of the hospitalist service who accepts the patient for admission.     Findings and plan explained to the Patient who consents to admission.   Discussed the patient with Dr. Kumari, who will admit the patient to an obs [facility] bed for further monitoring, evaluation, and treatment.     Impression & Plan      Medical Decision Making:  This very complicated 67-year-old with severe vascular disease with multiple stents and revisions presents to us because of chest pain.  This is been an ongoing problem for him for which he has been advised to take nitroglycerin.  He describes having his typical left-sided chest pain with radiation into his back this afternoon which resolved after nitroglycerin.  However, the pain then returned, again resolving after nitro.  With him having 2 spells, he elected to call 911 provided him with an aspirin and nitro as well.  On my assessment here, he notes the discomfort continues to come and go and is associated with some shortness of breath and lightheadedness.  EKG does not show any evidence of acute myocardial infarction.  His laboratory investigation is reassuring with a troponin returning in the normal range.  He was given a GI cocktail along with further nitroglycerin with improvement in his pain.  With his very extensive cardiac history, I do feel it is prudent to admit him for a formal rule out to make sure he is not suffering an infarction.  However, with his normal evaluation here,  I do not believe he needs to be started on blood thinners or a nitroglycerin drip.  I did consider other intrathoracic pathology such as pulmonary embolism or aortic dissection, but I feel that these are unlikely in this gentleman with a long history of cardiac disease and classic symptoms that have plagued him before.  I spoke with Dr. Kumari of the hospitalist service who agrees to admit the patient to the [facility] under observation status for further rule out and possible testing.    Addendum: After assessment by the hospitalist, the admitting team would like the patient in the CCU with a nitroglycerin drip and have him placed on the inpatient status.  The patient continues to have 3-4 out of 10 chest pain, but continues to be stable.  Nitroglycerin will be titrated for pain control as blood pressure allows.  Her feeding was also prescribed.    Diagnosis:    ICD-10-CM   1. Acute chest pain R07.9   2. History of coronary artery disease Z86.79     Disposition: Admitted    Sury Laura  10/10/2017    EMERGENCY DEPARTMENT    I, Sury Mirandamore, am serving as a scribe at 6:47 PM on 10/10/2017 to document services personally performed by Trierweiler, Chad A, MD based on my observations and the provider's statements to me.        Trierweiler, Chad A, MD  10/10/17 2036       Trierweiler, Chad A, MD  10/10/17 2043

## 2017-10-10 NOTE — IP AVS SNAPSHOT
MRN:[id number]                      After Visit Summary   10/10/2017    Donald Raza    MRN: [id number]           Thank you!     Thank you for choosing [facility] for your care. Our goal is always to provide you with excellent care. Hearing back from our patients is one way we can continue to improve our services. Please take a few minutes to complete the written survey that you may receive in the mail after you visit with us. Thank you!        Patient Information     Date Of Birth          1950        About your hospital stay     You were admitted on:  October 10, 2017 You last received care in the:  [facility]    You were discharged on:  October 12, 2017        Reason for your hospital stay       Chest pain                  Who to Call     For medical emergencies, please call 911.  For non-urgent questions about your medical care, please call your primary care provider or clinic, [phone]          Attending Provider     Provider Specialty    Trierweiler, Chad A, MD Emergency Medicine    [place], Hui Orellana MD Internal Medicine       Primary Care Provider Office Phone # Fax #    Aida Thomas [phone]      After Care Instructions     Activity       Your activity upon discharge: activity as tolerated            Diet       Follow this diet upon discharge: Orders Placed This Encounter      Combination Diet Dialysis Diet                  Follow-up Appointments     Follow-up and recommended labs and tests        Follow up with primary care provider, Aida Thomas, within 2-3 weeks, for hospital follow- up. No follow up labs or test are needed.    Also needs follow-up with cardiology                  Your next 10 appointments already scheduled     Nov 10, 2017  8:00 AM CST   Return Discharge with DEDRICK Singh CNP   [facility] [place] ([facility])    [address]  [address]  [facility] [id number] [phone]              Additional Services     Follow-Up with Cardiac Advanced Practice Provider                 Pending Results     Date and Time Order Name Status Description    10/11/2017 1304 EKG 12-lead, tracing only Preliminary             Statement of Approval     Ordered          10/12/17 1520  I have reviewed and agree with all the recommendations and orders detailed in this document.  EFFECTIVE NOW     Approved and electronically signed by:  Manuelito Pacheco DO             Admission Information     Date & Time Provider Department Dept. Phone    10/10/2017 Hui Kumari MD [facility] [phone]      Your Vitals Were     Blood Pressure Pulse Temperature Respirations Height Weight    156/79 65 98.8  F (37.1  C) (Oral) 15 1.803 m (5' 11\") 89.8 kg (197 lb 15.6 oz)    Pulse Oximetry BMI (Body Mass Index)                90% 27.61 kg/m2          [employer] Information     [employer] lets you send messages to your doctor, view your test results, renew your prescriptions, schedule appointments and more. To sign up, go to [email or web address] . Click on \"Log in\" on the left side of the screen, which will take you to the Welcome page. Then click on \"Sign up Now\" on the right side of the page.     You will be asked to enter the access code listed below, as well as some personal information. Please follow the directions to create your username and password.     Your access code is: [email or web address]  Expires: 2017  1:01 AM     Your access code will  in 90 days. If you need help or a new code, please call your [facility] clinic or [phone].        Care EveryWhere ID     This is your Care EveryWhere ID. This could be used by other organizations to access your [facility] medical records  [id number]        Equal Access to Services     JOON KERN AH: Adelaida Dolan, sheryl mendiola, qawon kaunruly núñez, fatoumata greenfield " arik leonideslinda namratacassidy la'aan ah. So [facility] [phone].    ATENCIÓN: Si cherriela isaías, tiene a ayala disposición servicios gratuitos de asistencia lingüística. Cherry al [phone].    We comply with applicable federal civil rights laws and [place] laws. We do not discriminate on the basis of race, color, national origin, age, disability, sex, sexual orientation, or gender identity.               Review of your medicines      CONTINUE these medicines which may have CHANGED, or have new prescriptions. If we are uncertain of the size of tablets/capsules you have at home, strength may be listed as something that might have changed.        Dose / Directions    * amLODIPine 5 MG tablet   Commonly known as:  NORVASC   This may have changed:  Another medication with the same name was changed. Make sure you understand how and when to take each.        Dose:  5 mg   Take 5 mg by mouth daily as needed (only uses when blood pressure greater than 150-160)   Refills:  0       * amLODIPine 2.5 MG tablet   Commonly known as:  NORVASC   This may have changed:  additional instructions   Used for:  Hypertension secondary to other renal disorders        Dose:  2.5 mg   Take 1 tablet (2.5 mg) by mouth daily   Quantity:  30 tablet   Refills:  0       insulin glargine 100 UNIT/ML injection   Commonly known as:  LANTUS   This may have changed:    - how much to take  - when to take this  - additional instructions   Used for:  Type 2 diabetes mellitus with chronic kidney disease on chronic dialysis, unspecified long term insulin use status (H)        Dose:  36 Units   Inject 36 Units Subcutaneous every morning   Refills:  0       insulin lispro 100 UNIT/ML injection   Commonly known as:  HumaLOG KWIKpen   This may have changed:  how much to take   Used for:  Type 2 diabetes mellitus with chronic kidney disease on chronic dialysis, unspecified long term insulin use status (H)        Dose:  5 Units   Inject 5 Units Subcutaneous 3 times daily  (before meals)   Refills:  0       * Notice:  This list has 2 medication(s) that are the same as other medications prescribed for you. Read the directions carefully, and ask your doctor or other care provider to review them with you.      CONTINUE these medicines which have NOT CHANGED        Dose / Directions    abacavir 300 MG tablet   Commonly known as:  ZIAGEN        Dose:  600 mg   Take 600 mg by mouth every evening   Refills:  0       albuterol 108 (90 BASE) MCG/ACT Inhaler   Commonly known as:  PROAIR HFA/PROVENTIL HFA/VENTOLIN HFA   Used for:  Cough        Dose:  2 puff   Inhale 2 puffs into the lungs every 6 hours as needed for shortness of breath / dyspnea or wheezing   Quantity:  1 Inhaler   Refills:  1       atorvastatin 40 MG tablet   Commonly known as:  LIPITOR        Dose:  40 mg   Take 40 mg by mouth At Bedtime   Refills:  0       B COMPLEX-C-FOLIC ACID PO        Dose:  1 tablet   Take 1 tablet by mouth At Bedtime   Refills:  0       chlorhexidine 0.12 % solution   Commonly known as:  PERIDEX        Rinse and gargle 15 ml by mouth twice a day as directed.   Refills:  0       CLONAZEPAM PO        Dose:  0.5 mg   Take 0.5 mg by mouth nightly as needed for anxiety (restless legs)   Refills:  0       CLOPIDOGREL BISULFATE PO        Dose:  75 mg   Take 75 mg by mouth every evening   Refills:  0       dapsone 100 MG tablet        Dose:  100 mg   Take 100 mg by mouth At Bedtime   Refills:  0       dolutegravir 50 MG tablet   Commonly known as:  TIVICAY        Dose:  50 mg   Take 50 mg by mouth At Bedtime   Refills:  0       DOXERCALCIFEROL IV        Dose:  6 mcg   Inject 6 mcg into the vein three times a week (with dialysis)   Refills:  0       epoetin brittni 59722 UNIT/ML injection   Commonly known as:  EPOGEN/PROCRIT        Dose:  90930 Units   Inject 11,000 Units Subcutaneous three times a week WITH DIALYSIS   Refills:  0       * gabapentin 300 MG capsule   Commonly known as:  NEURONTIN        Dose:  300  "mg   Take 300 mg by mouth every morning   Refills:  0       * gabapentin 300 MG capsule   Commonly known as:  NEURONTIN        Dose:  600 mg   Take 600 mg by mouth every evening   Refills:  0       HYDRALAZINE HCL PO        Dose:  25 mg   Take 25 mg by mouth 2 times daily as needed for high blood pressure   Refills:  0       imiquimod 5 % cream   Commonly known as:  ALDARA        Apply topically as needed   Refills:  0       iron sucrose 20 MG/ML injection   Commonly known as:  VENOFER        Dose:  50 mg   Inject 50 mg into the vein once a week WIth dialysis   Refills:  0       isosorbide mononitrate 30 MG 24 hr tablet   Commonly known as:  IMDUR   Used for:  Coronary artery disease involving native heart, angina presence unspecified, unspecified vessel or lesion type, Hypertension secondary to other renal disorders        Dose:  90 mg   Take 3 tablets (90 mg) by mouth every evening   Quantity:  270 tablet   Refills:  1       lamISIL AT 1 % cream   Generic drug:  terbinafine        Apply topically 2 times daily as needed   Refills:  0       losartan 100 MG tablet   Commonly known as:  COZAAR   Used for:  Hypertension secondary to other renal disorders        Dose:  100 mg   Take 1 tablet (100 mg) by mouth At Bedtime   Quantity:  30 tablet   Refills:  0       MAGNESIUM OXIDE PO        Dose:  400 mg   Take 400 mg by mouth At Bedtime   Refills:  0       mycophenolate 500 MG tablet   Commonly known as:  GENERIC EQUIVALENT        Dose:  500 mg   Take 500 mg by mouth 2 times daily On an empty stomach   Refills:  0       NEPRO PO        1-3 times daily   Refills:  0       nitroGLYcerin 0.4 MG sublingual tablet   Commonly known as:  NITROSTAT   Used for:  Coronary artery disease due to lipid rich plaque, Status post coronary angioplasty        One tablet under the tongue every 5 minutes if needed for chest pain. May repeat every 5 minutes for a maximum of 3 doses in 15 minutes\"   Quantity:  25 tablet   Refills:  3       " order for DME   Used for:  SOB (shortness of breath), Generalized muscle weakness        Equipment being ordered: Other: 4WW Treatment Diagnosis: Decreased activity tolerance   Quantity:  1 each   Refills:  0       PREZISTA PO        Dose:  800 mg   Take 800 mg by mouth At Bedtime.   Refills:  0       priLOSEC 40 MG capsule   Generic drug:  omeprazole        Dose:  40 mg   Take 40 mg by mouth daily 1 hour before dinner   Refills:  0       ritonavir 100 MG capsule   Commonly known as:  NORVIR        Dose:  1 capsule   Take 1 capsule by mouth At Bedtime   Refills:  0       * Notice:  This list has 2 medication(s) that are the same as other medications prescribed for you. Read the directions carefully, and ask your doctor or other care provider to review them with you.             Protect others around you: Learn how to safely use, store and throw away your medicines at www.disposemymeds.org.             Medication List: This is a list of all your medications and when to take them. Check marks below indicate your daily home schedule. Keep this list as a reference.      Medications           Morning Afternoon Evening Bedtime As Needed    abacavir 300 MG tablet   Commonly known as:  ZIAGEN   Take 600 mg by mouth every evening   Last time this was given:  600 mg on 10/11/2017 11:04 PM   Next Dose Due:  tonight                                   albuterol 108 (90 BASE) MCG/ACT Inhaler   Commonly known as:  PROAIR HFA/PROVENTIL HFA/VENTOLIN HFA   Inhale 2 puffs into the lungs every 6 hours as needed for shortness of breath / dyspnea or wheezing                                   * amLODIPine 5 MG tablet   Commonly known as:  NORVASC   Take 5 mg by mouth daily as needed (only uses when blood pressure greater than 150-160)                                   * amLODIPine 2.5 MG tablet   Commonly known as:  NORVASC   Take 1 tablet (2.5 mg) by mouth daily                                atorvastatin 40 MG tablet   Commonly known as:   LIPITOR   Take 40 mg by mouth At Bedtime   Last time this was given:  40 mg on 10/11/2017 10:53 PM                                   B COMPLEX-C-FOLIC ACID PO   Take 1 tablet by mouth At Bedtime   Last time this was given:  1 tablet on 10/11/2017 10:55 PM                                   chlorhexidine 0.12 % solution   Commonly known as:  PERIDEX   Rinse and gargle 15 ml by mouth twice a day as directed.   Last time this was given:  15 mLs on 10/11/2017 10:09 AM                                      CLONAZEPAM PO   Take 0.5 mg by mouth nightly as needed for anxiety (restless legs)                                   CLOPIDOGREL BISULFATE PO   Take 75 mg by mouth every evening   Last time this was given:  75 mg on 10/11/2017 10:55 PM                                   dapsone 100 MG tablet   Take 100 mg by mouth At Bedtime   Last time this was given:  100 mg on 10/11/2017 10:55 PM                                   dolutegravir 50 MG tablet   Commonly known as:  TIVICAY   Take 50 mg by mouth At Bedtime   Last time this was given:  50 mg on 10/11/2017 10:53 PM                                   DOXERCALCIFEROL IV   Inject 6 mcg into the vein three times a week (with dialysis)   Last time this was given:  4 mcg on 10/12/2017  9:09 AM         WITH DIALYSIS                       epoetin brittni 23438 UNIT/ML injection   Commonly known as:  EPOGEN/PROCRIT   Inject 11,000 Units Subcutaneous three times a week WITH DIALYSIS   Last time this was given:  10,000 Units on 10/12/2017 10:03 AM         WITH DIALYSIS                       * gabapentin 300 MG capsule   Commonly known as:  NEURONTIN   Take 300 mg by mouth every morning   Last time this was given:  300 mg on 10/12/2017  2:30 PM                                   * gabapentin 300 MG capsule   Commonly known as:  NEURONTIN   Take 600 mg by mouth every evening   Last time this was given:  300 mg on 10/12/2017  2:30 PM                                   HYDRALAZINE HCL PO   Take  "25 mg by mouth 2 times daily as needed for high blood pressure                                   imiquimod 5 % cream   Commonly known as:  ALDARA   Apply topically as needed                                   insulin glargine 100 UNIT/ML injection   Commonly known as:  LANTUS   Inject 36 Units Subcutaneous every morning   Last time this was given:  36 Units on 10/12/2017 10:29 AM                                   insulin lispro 100 UNIT/ML injection   Commonly known as:  HumaLOG KWIKpen   Inject 5 Units Subcutaneous 3 times daily (before meals)                                         iron sucrose 20 MG/ML injection   Commonly known as:  VENOFER   Inject 50 mg into the vein once a week WIth dialysis         With Dialysis                       isosorbide mononitrate 30 MG 24 hr tablet   Commonly known as:  IMDUR   Take 3 tablets (90 mg) by mouth every evening                                   lamISIL AT 1 % cream   Apply topically 2 times daily as needed   Generic drug:  terbinafine                                   losartan 100 MG tablet   Commonly known as:  COZAAR   Take 1 tablet (100 mg) by mouth At Bedtime   Last time this was given:  100 mg on 10/11/2017 10:54 PM                                   MAGNESIUM OXIDE PO   Take 400 mg by mouth At Bedtime   Last time this was given:  400 mg on 10/11/2017 10:54 PM                                   mycophenolate 500 MG tablet   Commonly known as:  GENERIC EQUIVALENT   Take 500 mg by mouth 2 times daily On an empty stomach   Last time this was given:  500 mg on 10/11/2017 10:53 PM                                      NEPRO PO   1-3 times daily                                         nitroGLYcerin 0.4 MG sublingual tablet   Commonly known as:  NITROSTAT   One tablet under the tongue every 5 minutes if needed for chest pain. May repeat every 5 minutes for a maximum of 3 doses in 15 minutes\"                                   order for DME   Equipment being ordered: Other: 4WW " Treatment Diagnosis: Decreased activity tolerance                                PREZISTA PO   Take 800 mg by mouth At Bedtime.   Last time this was given:  800 mg on 10/11/2017 10:52 PM                                   priLOSEC 40 MG capsule   Take 40 mg by mouth daily 1 hour before dinner   Last time this was given:  40 mg on 10/12/2017  5:41 PM   Generic drug:  omeprazole   Next Dose Due:  10/13                                     ritonavir 100 MG capsule   Commonly known as:  NORVIR   Take 1 capsule by mouth At Bedtime                                   * Notice:  This list has 4 medication(s) that are the same as other medications prescribed for you. Read the directions carefully, and ask your doctor or other care provider to review them with you.

## 2017-10-10 NOTE — IP AVS SNAPSHOT
[facility]    [address]    [facility] [address]    Phone:  [phone]                                       After Visit Summary   10/10/2017    Donald Raza    MRN: [id number]           After Visit Summary Signature Page     I have received my discharge instructions, and my questions have been answered. I have discussed any challenges I see with this plan with the nurse or doctor.    ..........................................................................................................................................  Patient/Patient Representative Signature      ..........................................................................................................................................  Patient Representative Print Name and Relationship to Patient    ..................................................               ................................................  Date                                            Time    ..........................................................................................................................................  Reviewed by Signature/Title    ...................................................              ..............................................  Date                                                            Time

## 2017-10-11 ENCOUNTER — APPOINTMENT (OUTPATIENT)
Dept: CARDIOLOGY | Facility: CLINIC | Age: 67
DRG: 286 | End: 2017-10-11
Attending: INTERNAL MEDICINE
Payer: MEDICARE

## 2017-10-11 LAB
ERYTHROCYTE [DISTWIDTH] IN BLOOD BY AUTOMATED COUNT: 17.4 % (ref 10–15)
GLUCOSE BLDC GLUCOMTR-MCNC: 111 MG/DL (ref 70–99)
GLUCOSE BLDC GLUCOMTR-MCNC: 129 MG/DL (ref 70–99)
GLUCOSE BLDC GLUCOMTR-MCNC: 137 MG/DL (ref 70–99)
GLUCOSE BLDC GLUCOMTR-MCNC: 76 MG/DL (ref 70–99)
HCT VFR BLD AUTO: 27 % (ref 40–53)
HGB BLD-MCNC: 8.3 G/DL (ref 13.3–17.7)
MCH RBC QN AUTO: 27 PG (ref 26.5–33)
MCHC RBC AUTO-ENTMCNC: 30.7 G/DL (ref 31.5–36.5)
MCV RBC AUTO: 88 FL (ref 78–100)
PLATELET # BLD AUTO: 127 10E9/L (ref 150–450)
RBC # BLD AUTO: 3.07 10E12/L (ref 4.4–5.9)
TROPONIN I SERPL-MCNC: 0.02 UG/L (ref 0–0.04)
TROPONIN I SERPL-MCNC: 0.02 UG/L (ref 0–0.04)
WBC # BLD AUTO: 5.2 10E9/L (ref 4–11)

## 2017-10-11 PROCEDURE — 4A023N7 MEASUREMENT OF CARDIAC SAMPLING AND PRESSURE, LEFT HEART, PERCUTANEOUS APPROACH: ICD-10-PCS | Performed by: INTERNAL MEDICINE

## 2017-10-11 PROCEDURE — 93308 TTE F-UP OR LMTD: CPT | Mod: 26 | Performed by: INTERNAL MEDICINE

## 2017-10-11 PROCEDURE — C1769 GUIDE WIRE: HCPCS

## 2017-10-11 PROCEDURE — 27210946 ZZH KIT HC TOTES DISP CR8

## 2017-10-11 PROCEDURE — A9270 NON-COVERED ITEM OR SERVICE: HCPCS | Mod: GY | Performed by: INTERNAL MEDICINE

## 2017-10-11 PROCEDURE — 85027 COMPLETE CBC AUTOMATED: CPT | Performed by: INTERNAL MEDICINE

## 2017-10-11 PROCEDURE — 25000128 H RX IP 250 OP 636: Performed by: INTERNAL MEDICINE

## 2017-10-11 PROCEDURE — 99223 1ST HOSP IP/OBS HIGH 75: CPT | Mod: 25 | Performed by: INTERNAL MEDICINE

## 2017-10-11 PROCEDURE — 99152 MOD SED SAME PHYS/QHP 5/>YRS: CPT | Mod: GC | Performed by: INTERNAL MEDICINE

## 2017-10-11 PROCEDURE — 4A033BC MEASUREMENT OF ARTERIAL PRESSURE, CORONARY, PERCUTANEOUS APPROACH: ICD-10-PCS | Performed by: INTERNAL MEDICINE

## 2017-10-11 PROCEDURE — B2111ZZ FLUOROSCOPY OF MULTIPLE CORONARY ARTERIES USING LOW OSMOLAR CONTRAST: ICD-10-PCS | Performed by: INTERNAL MEDICINE

## 2017-10-11 PROCEDURE — 93458 L HRT ARTERY/VENTRICLE ANGIO: CPT

## 2017-10-11 PROCEDURE — 25000132 ZZH RX MED GY IP 250 OP 250 PS 637: Mod: GY | Performed by: INTERNAL MEDICINE

## 2017-10-11 PROCEDURE — 84484 ASSAY OF TROPONIN QUANT: CPT | Performed by: INTERNAL MEDICINE

## 2017-10-11 PROCEDURE — 25000131 ZZH RX MED GY IP 250 OP 636 PS 637: Mod: GY | Performed by: INTERNAL MEDICINE

## 2017-10-11 PROCEDURE — 25000125 ZZHC RX 250: Performed by: INTERNAL MEDICINE

## 2017-10-11 PROCEDURE — 27210795 ZZH PAD DEFIB QUICK CR4

## 2017-10-11 PROCEDURE — 27210856 ZZH ACCESS HEART CATH CR2

## 2017-10-11 PROCEDURE — 99152 MOD SED SAME PHYS/QHP 5/>YRS: CPT

## 2017-10-11 PROCEDURE — 99233 SBSQ HOSP IP/OBS HIGH 50: CPT | Performed by: INTERNAL MEDICINE

## 2017-10-11 PROCEDURE — 27210787 ZZH MANIFOLD CR2

## 2017-10-11 PROCEDURE — 93571 IV DOP VEL&/PRESS C FLO 1ST: CPT | Mod: 26 | Performed by: INTERNAL MEDICINE

## 2017-10-11 PROCEDURE — 93571 IV DOP VEL&/PRESS C FLO 1ST: CPT

## 2017-10-11 PROCEDURE — 00000146 ZZHCL STATISTIC GLUCOSE BY METER IP

## 2017-10-11 PROCEDURE — 99153 MOD SED SAME PHYS/QHP EA: CPT

## 2017-10-11 PROCEDURE — 93325 DOPPLER ECHO COLOR FLOW MAPG: CPT | Mod: 26 | Performed by: INTERNAL MEDICINE

## 2017-10-11 PROCEDURE — 93458 L HRT ARTERY/VENTRICLE ANGIO: CPT | Mod: 26 | Performed by: INTERNAL MEDICINE

## 2017-10-11 PROCEDURE — 21000000 ZZH R&B IMCU HEART CARE

## 2017-10-11 PROCEDURE — 27211089 ZZH KIT ACIST INJECTOR CR3

## 2017-10-11 PROCEDURE — 93321 DOPPLER ECHO F-UP/LMTD STD: CPT | Mod: 26 | Performed by: INTERNAL MEDICINE

## 2017-10-11 PROCEDURE — 25000128 H RX IP 250 OP 636

## 2017-10-11 PROCEDURE — 93308 TTE F-UP OR LMTD: CPT

## 2017-10-11 PROCEDURE — 93572 IV DOP VEL&/PRESS C FLO EA: CPT

## 2017-10-11 PROCEDURE — 93010 ELECTROCARDIOGRAM REPORT: CPT | Performed by: INTERNAL MEDICINE

## 2017-10-11 PROCEDURE — 36415 COLL VENOUS BLD VENIPUNCTURE: CPT | Performed by: INTERNAL MEDICINE

## 2017-10-11 PROCEDURE — 27210910 ZZH NEEDLE CR6

## 2017-10-11 PROCEDURE — 93005 ELECTROCARDIOGRAM TRACING: CPT

## 2017-10-11 RX ORDER — LIDOCAINE 40 MG/G
CREAM TOPICAL
Status: DISCONTINUED | OUTPATIENT
Start: 2017-10-11 | End: 2017-10-11 | Stop reason: HOSPADM

## 2017-10-11 RX ORDER — LORAZEPAM 0.5 MG/1
0.5 TABLET ORAL
Status: DISCONTINUED | OUTPATIENT
Start: 2017-10-11 | End: 2017-10-11 | Stop reason: HOSPADM

## 2017-10-11 RX ORDER — LORAZEPAM 2 MG/ML
.5-2 INJECTION INTRAMUSCULAR EVERY 4 HOURS PRN
Status: DISCONTINUED | OUTPATIENT
Start: 2017-10-11 | End: 2017-10-11 | Stop reason: HOSPADM

## 2017-10-11 RX ORDER — PROTAMINE SULFATE 10 MG/ML
25-100 INJECTION, SOLUTION INTRAVENOUS EVERY 5 MIN PRN
Status: DISCONTINUED | OUTPATIENT
Start: 2017-10-11 | End: 2017-10-11 | Stop reason: HOSPADM

## 2017-10-11 RX ORDER — FENTANYL CITRATE 50 UG/ML
25-50 INJECTION, SOLUTION INTRAMUSCULAR; INTRAVENOUS
Status: ACTIVE | OUTPATIENT
Start: 2017-10-11 | End: 2017-10-12

## 2017-10-11 RX ORDER — CLOPIDOGREL BISULFATE 75 MG/1
75 TABLET ORAL
Status: DISCONTINUED | OUTPATIENT
Start: 2017-10-11 | End: 2017-10-11 | Stop reason: HOSPADM

## 2017-10-11 RX ORDER — PRASUGREL 10 MG/1
10-60 TABLET, FILM COATED ORAL
Status: DISCONTINUED | OUTPATIENT
Start: 2017-10-11 | End: 2017-10-11 | Stop reason: HOSPADM

## 2017-10-11 RX ORDER — HYDRALAZINE HYDROCHLORIDE 20 MG/ML
10-20 INJECTION INTRAMUSCULAR; INTRAVENOUS
Status: DISCONTINUED | OUTPATIENT
Start: 2017-10-11 | End: 2017-10-11 | Stop reason: HOSPADM

## 2017-10-11 RX ORDER — DIPHENHYDRAMINE HYDROCHLORIDE 50 MG/ML
25-50 INJECTION INTRAMUSCULAR; INTRAVENOUS
Status: DISCONTINUED | OUTPATIENT
Start: 2017-10-11 | End: 2017-10-11 | Stop reason: HOSPADM

## 2017-10-11 RX ORDER — FENTANYL CITRATE 50 UG/ML
25-50 INJECTION, SOLUTION INTRAMUSCULAR; INTRAVENOUS
Status: DISCONTINUED | OUTPATIENT
Start: 2017-10-11 | End: 2017-10-11 | Stop reason: HOSPADM

## 2017-10-11 RX ORDER — NALOXONE HYDROCHLORIDE 0.4 MG/ML
.1-.4 INJECTION, SOLUTION INTRAMUSCULAR; INTRAVENOUS; SUBCUTANEOUS
Status: DISCONTINUED | OUTPATIENT
Start: 2017-10-11 | End: 2017-10-12 | Stop reason: HOSPADM

## 2017-10-11 RX ORDER — ASPIRIN 81 MG/1
81-324 TABLET, CHEWABLE ORAL
Status: DISCONTINUED | OUTPATIENT
Start: 2017-10-11 | End: 2017-10-11 | Stop reason: HOSPADM

## 2017-10-11 RX ORDER — VERAPAMIL HYDROCHLORIDE 2.5 MG/ML
1-2.5 INJECTION, SOLUTION INTRAVENOUS
Status: DISCONTINUED | OUTPATIENT
Start: 2017-10-11 | End: 2017-10-11 | Stop reason: HOSPADM

## 2017-10-11 RX ORDER — SODIUM CHLORIDE 9 MG/ML
INJECTION, SOLUTION INTRAVENOUS CONTINUOUS
Status: DISCONTINUED | OUTPATIENT
Start: 2017-10-11 | End: 2017-10-12 | Stop reason: HOSPADM

## 2017-10-11 RX ORDER — NITROGLYCERIN 5 MG/ML
100-200 VIAL (ML) INTRAVENOUS
Status: DISCONTINUED | OUTPATIENT
Start: 2017-10-11 | End: 2017-10-11 | Stop reason: HOSPADM

## 2017-10-11 RX ORDER — LORAZEPAM 2 MG/ML
0.5 INJECTION INTRAMUSCULAR
Status: DISCONTINUED | OUTPATIENT
Start: 2017-10-11 | End: 2017-10-11 | Stop reason: HOSPADM

## 2017-10-11 RX ORDER — FUROSEMIDE 10 MG/ML
20-100 INJECTION INTRAMUSCULAR; INTRAVENOUS
Status: DISCONTINUED | OUTPATIENT
Start: 2017-10-11 | End: 2017-10-11 | Stop reason: HOSPADM

## 2017-10-11 RX ORDER — FLUMAZENIL 0.1 MG/ML
0.2 INJECTION, SOLUTION INTRAVENOUS
Status: ACTIVE | OUTPATIENT
Start: 2017-10-11 | End: 2017-10-12

## 2017-10-11 RX ORDER — DOBUTAMINE HYDROCHLORIDE 200 MG/100ML
2-20 INJECTION INTRAVENOUS CONTINUOUS PRN
Status: DISCONTINUED | OUTPATIENT
Start: 2017-10-11 | End: 2017-10-11 | Stop reason: HOSPADM

## 2017-10-11 RX ORDER — NITROGLYCERIN 20 MG/100ML
.07-2 INJECTION INTRAVENOUS CONTINUOUS PRN
Status: DISCONTINUED | OUTPATIENT
Start: 2017-10-11 | End: 2017-10-11 | Stop reason: HOSPADM

## 2017-10-11 RX ORDER — LIDOCAINE HYDROCHLORIDE 10 MG/ML
1-10 INJECTION, SOLUTION EPIDURAL; INFILTRATION; INTRACAUDAL; PERINEURAL
Status: COMPLETED | OUTPATIENT
Start: 2017-10-11 | End: 2017-10-11

## 2017-10-11 RX ORDER — SODIUM CHLORIDE 9 MG/ML
INJECTION, SOLUTION INTRAVENOUS CONTINUOUS
Status: DISCONTINUED | OUTPATIENT
Start: 2017-10-11 | End: 2017-10-11 | Stop reason: HOSPADM

## 2017-10-11 RX ORDER — METHYLPREDNISOLONE SODIUM SUCCINATE 125 MG/2ML
125 INJECTION, POWDER, LYOPHILIZED, FOR SOLUTION INTRAMUSCULAR; INTRAVENOUS
Status: DISCONTINUED | OUTPATIENT
Start: 2017-10-11 | End: 2017-10-11 | Stop reason: HOSPADM

## 2017-10-11 RX ORDER — ASPIRIN 81 MG/1
81 TABLET ORAL DAILY
Status: DISCONTINUED | OUTPATIENT
Start: 2017-10-12 | End: 2017-10-12 | Stop reason: HOSPADM

## 2017-10-11 RX ORDER — NIFEDIPINE 10 MG/1
10 CAPSULE ORAL
Status: DISCONTINUED | OUTPATIENT
Start: 2017-10-11 | End: 2017-10-11 | Stop reason: HOSPADM

## 2017-10-11 RX ORDER — PROTAMINE SULFATE 10 MG/ML
1-5 INJECTION, SOLUTION INTRAVENOUS
Status: DISCONTINUED | OUTPATIENT
Start: 2017-10-11 | End: 2017-10-11 | Stop reason: HOSPADM

## 2017-10-11 RX ORDER — BUPIVACAINE HYDROCHLORIDE 2.5 MG/ML
1-10 INJECTION, SOLUTION EPIDURAL; INFILTRATION; INTRACAUDAL
Status: DISCONTINUED | OUTPATIENT
Start: 2017-10-11 | End: 2017-10-11 | Stop reason: HOSPADM

## 2017-10-11 RX ORDER — EPINEPHRINE 1 MG/ML
0.3 INJECTION, SOLUTION, CONCENTRATE INTRAVENOUS
Status: DISCONTINUED | OUTPATIENT
Start: 2017-10-11 | End: 2017-10-11 | Stop reason: HOSPADM

## 2017-10-11 RX ORDER — DEXTROSE MONOHYDRATE 25 G/50ML
12.5-5 INJECTION, SOLUTION INTRAVENOUS EVERY 30 MIN PRN
Status: DISCONTINUED | OUTPATIENT
Start: 2017-10-11 | End: 2017-10-11 | Stop reason: HOSPADM

## 2017-10-11 RX ORDER — ADENOSINE 3 MG/ML
12-12000 INJECTION, SOLUTION INTRAVENOUS
Status: DISCONTINUED | OUTPATIENT
Start: 2017-10-11 | End: 2017-10-11 | Stop reason: HOSPADM

## 2017-10-11 RX ORDER — NITROGLYCERIN 0.4 MG/1
0.4 TABLET SUBLINGUAL EVERY 5 MIN PRN
Status: DISCONTINUED | OUTPATIENT
Start: 2017-10-11 | End: 2017-10-11 | Stop reason: HOSPADM

## 2017-10-11 RX ORDER — NITROGLYCERIN 5 MG/ML
100-500 VIAL (ML) INTRAVENOUS
Status: DISCONTINUED | OUTPATIENT
Start: 2017-10-11 | End: 2017-10-11 | Stop reason: HOSPADM

## 2017-10-11 RX ORDER — SODIUM NITROPRUSSIDE 25 MG/ML
100-200 INJECTION INTRAVENOUS
Status: DISCONTINUED | OUTPATIENT
Start: 2017-10-11 | End: 2017-10-11 | Stop reason: HOSPADM

## 2017-10-11 RX ORDER — NALOXONE HYDROCHLORIDE 0.4 MG/ML
.2-.4 INJECTION, SOLUTION INTRAMUSCULAR; INTRAVENOUS; SUBCUTANEOUS
Status: ACTIVE | OUTPATIENT
Start: 2017-10-11 | End: 2017-10-12

## 2017-10-11 RX ORDER — FLUMAZENIL 0.1 MG/ML
0.2 INJECTION, SOLUTION INTRAVENOUS
Status: DISCONTINUED | OUTPATIENT
Start: 2017-10-11 | End: 2017-10-11 | Stop reason: HOSPADM

## 2017-10-11 RX ORDER — POTASSIUM CHLORIDE 1500 MG/1
20 TABLET, EXTENDED RELEASE ORAL
Status: DISCONTINUED | OUTPATIENT
Start: 2017-10-11 | End: 2017-10-11 | Stop reason: HOSPADM

## 2017-10-11 RX ORDER — PHENYLEPHRINE HCL IN 0.9% NACL 1 MG/10 ML
20-100 SYRINGE (ML) INTRAVENOUS
Status: DISCONTINUED | OUTPATIENT
Start: 2017-10-11 | End: 2017-10-11 | Stop reason: HOSPADM

## 2017-10-11 RX ORDER — ATROPINE SULFATE 0.1 MG/ML
0.5 INJECTION INTRAVENOUS EVERY 5 MIN PRN
Status: ACTIVE | OUTPATIENT
Start: 2017-10-11 | End: 2017-10-12

## 2017-10-11 RX ORDER — CLOPIDOGREL 300 MG/1
300-600 TABLET, FILM COATED ORAL
Status: DISCONTINUED | OUTPATIENT
Start: 2017-10-11 | End: 2017-10-11 | Stop reason: HOSPADM

## 2017-10-11 RX ORDER — ATROPINE SULFATE 0.1 MG/ML
.5-1 INJECTION INTRAVENOUS
Status: DISCONTINUED | OUTPATIENT
Start: 2017-10-11 | End: 2017-10-11 | Stop reason: HOSPADM

## 2017-10-11 RX ORDER — DOPAMINE HYDROCHLORIDE 160 MG/100ML
2-20 INJECTION, SOLUTION INTRAVENOUS CONTINUOUS PRN
Status: DISCONTINUED | OUTPATIENT
Start: 2017-10-11 | End: 2017-10-11 | Stop reason: HOSPADM

## 2017-10-11 RX ORDER — METOPROLOL TARTRATE 1 MG/ML
5 INJECTION, SOLUTION INTRAVENOUS EVERY 5 MIN PRN
Status: DISCONTINUED | OUTPATIENT
Start: 2017-10-11 | End: 2017-10-11 | Stop reason: HOSPADM

## 2017-10-11 RX ORDER — LIDOCAINE HYDROCHLORIDE 10 MG/ML
30 INJECTION, SOLUTION EPIDURAL; INFILTRATION; INTRACAUDAL; PERINEURAL
Status: DISCONTINUED | OUTPATIENT
Start: 2017-10-11 | End: 2017-10-11 | Stop reason: HOSPADM

## 2017-10-11 RX ORDER — POTASSIUM CHLORIDE 7.45 MG/ML
10 INJECTION INTRAVENOUS
Status: DISCONTINUED | OUTPATIENT
Start: 2017-10-11 | End: 2017-10-11 | Stop reason: HOSPADM

## 2017-10-11 RX ORDER — LIDOCAINE 40 MG/G
CREAM TOPICAL
Status: DISCONTINUED | OUTPATIENT
Start: 2017-10-11 | End: 2017-10-12 | Stop reason: HOSPADM

## 2017-10-11 RX ORDER — IOPAMIDOL 755 MG/ML
140 INJECTION, SOLUTION INTRAVASCULAR ONCE
Status: COMPLETED | OUTPATIENT
Start: 2017-10-11 | End: 2017-10-11

## 2017-10-11 RX ORDER — PROMETHAZINE HYDROCHLORIDE 25 MG/ML
6.25-25 INJECTION, SOLUTION INTRAMUSCULAR; INTRAVENOUS EVERY 4 HOURS PRN
Status: DISCONTINUED | OUTPATIENT
Start: 2017-10-11 | End: 2017-10-11 | Stop reason: HOSPADM

## 2017-10-11 RX ORDER — NALOXONE HYDROCHLORIDE 0.4 MG/ML
0.4 INJECTION, SOLUTION INTRAMUSCULAR; INTRAVENOUS; SUBCUTANEOUS EVERY 5 MIN PRN
Status: DISCONTINUED | OUTPATIENT
Start: 2017-10-11 | End: 2017-10-11 | Stop reason: HOSPADM

## 2017-10-11 RX ORDER — ENALAPRILAT 1.25 MG/ML
1.25-2.5 INJECTION INTRAVENOUS
Status: DISCONTINUED | OUTPATIENT
Start: 2017-10-11 | End: 2017-10-11 | Stop reason: HOSPADM

## 2017-10-11 RX ORDER — NICARDIPINE HYDROCHLORIDE 2.5 MG/ML
100 INJECTION INTRAVENOUS
Status: DISCONTINUED | OUTPATIENT
Start: 2017-10-11 | End: 2017-10-11 | Stop reason: HOSPADM

## 2017-10-11 RX ORDER — HEPARIN SODIUM 1000 [USP'U]/ML
1000-10000 INJECTION, SOLUTION INTRAVENOUS; SUBCUTANEOUS EVERY 5 MIN PRN
Status: DISCONTINUED | OUTPATIENT
Start: 2017-10-11 | End: 2017-10-11 | Stop reason: HOSPADM

## 2017-10-11 RX ORDER — MORPHINE SULFATE 2 MG/ML
1-2 INJECTION, SOLUTION INTRAMUSCULAR; INTRAVENOUS EVERY 5 MIN PRN
Status: DISCONTINUED | OUTPATIENT
Start: 2017-10-11 | End: 2017-10-11 | Stop reason: HOSPADM

## 2017-10-11 RX ORDER — HYDROCODONE BITARTRATE AND ACETAMINOPHEN 5; 325 MG/1; MG/1
1-2 TABLET ORAL EVERY 4 HOURS PRN
Status: DISCONTINUED | OUTPATIENT
Start: 2017-10-11 | End: 2017-10-12 | Stop reason: HOSPADM

## 2017-10-11 RX ORDER — POTASSIUM CHLORIDE 29.8 MG/ML
20 INJECTION INTRAVENOUS
Status: DISCONTINUED | OUTPATIENT
Start: 2017-10-11 | End: 2017-10-11 | Stop reason: HOSPADM

## 2017-10-11 RX ORDER — ONDANSETRON 2 MG/ML
4 INJECTION INTRAMUSCULAR; INTRAVENOUS EVERY 4 HOURS PRN
Status: DISCONTINUED | OUTPATIENT
Start: 2017-10-11 | End: 2017-10-11 | Stop reason: HOSPADM

## 2017-10-11 RX ORDER — ASPIRIN 325 MG
325 TABLET ORAL
Status: DISCONTINUED | OUTPATIENT
Start: 2017-10-11 | End: 2017-10-11 | Stop reason: HOSPADM

## 2017-10-11 RX ORDER — ACETAMINOPHEN 325 MG/1
325-650 TABLET ORAL EVERY 4 HOURS PRN
Status: DISCONTINUED | OUTPATIENT
Start: 2017-10-11 | End: 2017-10-11

## 2017-10-11 RX ADMIN — LOSARTAN POTASSIUM 100 MG: 100 TABLET, FILM COATED ORAL at 22:54

## 2017-10-11 RX ADMIN — INSULIN GLARGINE 18 UNITS: 100 INJECTION, SOLUTION SUBCUTANEOUS at 15:12

## 2017-10-11 RX ADMIN — ADENOSINE 240 MCG: 3 INJECTION, SOLUTION INTRAVENOUS at 19:36

## 2017-10-11 RX ADMIN — IOPAMIDOL 140 ML: 755 INJECTION, SOLUTION INTRAVASCULAR at 20:00

## 2017-10-11 RX ADMIN — Medication 15 ML: at 10:09

## 2017-10-11 RX ADMIN — CLOPIDOGREL 75 MG: 75 TABLET, FILM COATED ORAL at 22:55

## 2017-10-11 RX ADMIN — Medication 4800 UNITS: at 13:12

## 2017-10-11 RX ADMIN — ATORVASTATIN CALCIUM 40 MG: 40 TABLET, FILM COATED ORAL at 22:53

## 2017-10-11 RX ADMIN — NITROGLYCERIN 200 MCG: 5 INJECTION, SOLUTION INTRAVENOUS at 18:44

## 2017-10-11 RX ADMIN — DARUNAVIR 800 MG: 800 TABLET, FILM COATED ORAL at 22:52

## 2017-10-11 RX ADMIN — HEPARIN SODIUM 1250 UNITS/HR: 10000 INJECTION, SOLUTION INTRAVENOUS at 13:12

## 2017-10-11 RX ADMIN — ADENOSINE 240 MCG: 3 INJECTION, SOLUTION INTRAVENOUS at 19:35

## 2017-10-11 RX ADMIN — FENTANYL CITRATE 50 MCG: 50 INJECTION, SOLUTION INTRAMUSCULAR; INTRAVENOUS at 18:37

## 2017-10-11 RX ADMIN — GABAPENTIN 300 MG: 300 CAPSULE ORAL at 10:07

## 2017-10-11 RX ADMIN — SODIUM CHLORIDE: 9 INJECTION, SOLUTION INTRAVENOUS at 23:04

## 2017-10-11 RX ADMIN — MYCOPHENOLATE MOFETIL 500 MG: 500 TABLET ORAL at 22:53

## 2017-10-11 RX ADMIN — OMEPRAZOLE 40 MG: 20 CAPSULE, DELAYED RELEASE ORAL at 17:22

## 2017-10-11 RX ADMIN — RITONAVIR 100 MG: 100 TABLET, FILM COATED ORAL at 22:53

## 2017-10-11 RX ADMIN — LIDOCAINE HYDROCHLORIDE 100 MG: 10 INJECTION, SOLUTION EPIDURAL; INFILTRATION; INTRACAUDAL; PERINEURAL at 18:39

## 2017-10-11 RX ADMIN — NITROGLYCERIN 1.17 MCG/KG/MIN: 20 INJECTION INTRAVENOUS at 08:51

## 2017-10-11 RX ADMIN — NITROGLYCERIN 0.97 MCG/KG/MIN: 20 INJECTION INTRAVENOUS at 17:21

## 2017-10-11 RX ADMIN — MAGNESIUM OXIDE TAB 400 MG (241.3 MG ELEMENTAL MG) 400 MG: 400 (241.3 MG) TAB at 22:54

## 2017-10-11 RX ADMIN — B-COMPLEX W/ C & FOLIC ACID TAB 1 TABLET: TAB at 22:55

## 2017-10-11 RX ADMIN — MYCOPHENOLATE MOFETIL 500 MG: 500 TABLET ORAL at 00:05

## 2017-10-11 RX ADMIN — ASPIRIN 325 MG ORAL TABLET 325 MG: 325 PILL ORAL at 10:07

## 2017-10-11 RX ADMIN — GABAPENTIN 600 MG: 300 CAPSULE ORAL at 22:55

## 2017-10-11 RX ADMIN — MYCOPHENOLATE MOFETIL 500 MG: 500 TABLET ORAL at 06:42

## 2017-10-11 RX ADMIN — DARUNAVIR 800 MG: 800 TABLET, FILM COATED ORAL at 00:05

## 2017-10-11 RX ADMIN — ABACAVIR 600 MG: 300 TABLET, FILM COATED ORAL at 23:04

## 2017-10-11 RX ADMIN — MIDAZOLAM HYDROCHLORIDE 1 MG: 1 INJECTION, SOLUTION INTRAMUSCULAR; INTRAVENOUS at 18:37

## 2017-10-11 RX ADMIN — DAPSONE 100 MG: 100 TABLET ORAL at 22:55

## 2017-10-11 RX ADMIN — DOLUTEGRAVIR SODIUM 50 MG: 50 TABLET, FILM COATED ORAL at 22:53

## 2017-10-11 ASSESSMENT — PAIN DESCRIPTION - DESCRIPTORS
DESCRIPTORS: PRESSURE
DESCRIPTORS: PRESSURE

## 2017-10-11 NOTE — ED NOTES
".  [facility]  ED Nurse Handoff Report    ED Chief complaint: Chest Pain (Pt had sudden onset of CP at 3pm. Pt took 1 nitro which helped for 15min. Pt called EMS at 6pm when pain would not go away. Pt was given 4 nitro by EMS which reduced CP. Pt has hx of HIV, dialysis, and extensive cardiac.  )      ED Diagnosis:   Final diagnoses:   Acute chest pain   History of coronary artery disease       Code Status: Full Code    Allergies:   Allergies   Allergen Reactions     Calcium Acetate Other (See Comments)     Other reaction(s): Other, see comments  Pain in chest and back  Pain in chest area (sensitive skin)      Diagnostic X-Ray Materials Other (See Comments)     PN: renal failure     Lisinopril      Sulfa Drugs        Activity level - Baseline/Home:  Independent    Activity Level - Current:   Independent     Needed?: No    Isolation: No  Infection: Not Applicable    Bariatric?: No    Vital Signs:   Vitals:    10/10/17 1843 10/10/17 1904 10/10/17 1915 10/10/17 1944   BP: 158/85      Resp: 18 14 15 8   Temp: 98.8  F (37.1  C)      TempSrc: Oral      SpO2: 92% 97% 95% 93%   Weight: 90 kg (198 lb 6.6 oz)      Height: 1.803 m (5' 11\")          Cardiac Rhythm: ,        Pain level: 0-10 Pain Scale: 4    Is this patient confused?: No    Patient Report: Initial Complaint: Chest Pain  Focused Assessment: Received dialysis this morning (has 3 times a week- tu, th, sat). States he felt a little off ever since: chest pain into back, chills. Pain comes and goes, improved with nitro. Also given GI cocktail.  Tests Performed: Blood   Abnormal Results: WDL for patient  Treatments provided: GI cocktail, ASA, Nitro    Family Comments: Family present and very pleasant    OBS brochure/video discussed/provided to patient: N/A    ED Medications:   Medications   lidocaine (viscous) (XYLOCAINE) 2 % 15 mL, alum & mag hydroxide-simethicone (MYLANTA ES/MAALOX  ES) 15 mL GI Cocktail (not administered)   nitroGLYcerin " (NITROSTAT) sublingual tablet 0.4 mg (not administered)   lidocaine (viscous) (XYLOCAINE) 2 % 15 mL, alum & mag hydroxide-simethicone (MYLANTA ES/MAALOX  ES) 15 mL GI Cocktail (30 mLs Oral Given 10/10/17 1957)       Drips infusing?:  No      ED NURSE PHONE NUMBER: *[phone]

## 2017-10-11 NOTE — CONSULTS
[facility]    Inpatient Cardiology Consultation   Harris Dixon MD    Date of Admission:  10/10/2017    Inpatient cardiology consultation has been dictated. Reference number [id number]    Donald Raza is a 67 year old male who presents with unstable angina in the context of self discontinuing aspirin use for several months, while remaining on daily clopidogrel 75 mg. He is known to have end-stage renal disease (dialysis dependent), multivessel CAD with multiple stents and last stenting in August 2016, HIV, type 2 diabetes mellitus, ex-smoker status. His repeat coronary angiogram in January 2017 showed mild in-stent restenosis of the LAD stent but was otherwise unremarkable. He had a normal pharmacological stress test 3 months ago. His troponins are borderline elevated. On ECG, he has subtle new changes in the anteroseptal leads concerning for LAD territory lesion.    RECOMMENDATIONS:  1. Urgent coronary angiography with PCI if indicated. Informed consent obtained. Contrast exposure should not be a problem as he dialyzed yesterday and will do so again on Thursday.  2. Reload with aspirin 325 mg daily. Continue clopidogrel.  3. Start IV heparin infusion.  4. Continue IV nitroglycerin infusion as it is keeping his angina under control.    Cartilage and will follow.    REVIEW OF SYSTEMS:  A comprehensive 10 point review of systems was completed and the pertinent positives are documented in history of present illness.      MEDICATIONS:    Prior to Admission Medications   Prescriptions Last Dose Informant Patient Reported? Taking?   B COMPLEX-C-FOLIC ACID PO 10/9/2017 at Unknown time Self Yes Yes   Sig: Take 1 tablet by mouth At Bedtime    CLONAZEPAM PO  Self Yes Yes   Sig: Take 0.5 mg by mouth nightly as needed for anxiety (restless legs)   CLOPIDOGREL BISULFATE PO 10/9/2017 at Unknown time Self Yes Yes   Sig: Take 75 mg by mouth every evening    DOXERCALCIFEROL IV  Self Yes Yes   Sig: Inject 6 mcg  into the vein three times a week (with dialysis)   Darunavir Ethanolate (PREZISTA PO) 10/9/2017 at Unknown time Self Yes Yes   Sig: Take 800 mg by mouth At Bedtime.   HYDRALAZINE HCL PO 10/10/2017 at am Self Yes Yes   Sig: Take 25 mg by mouth 2 times daily as needed for high blood pressure   MAGNESIUM OXIDE PO 10/9/2017 at Unknown time Self Yes Yes   Sig: Take 400 mg by mouth At Bedtime   Nutritional Supplements (NEPRO PO)  Self Yes No   Si-3 times daily   abacavir (ZIAGEN) 300 MG tablet 10/9/2017 at Unknown time Self Yes Yes   Sig: Take 600 mg by mouth every evening    albuterol (PROAIR HFA/PROVENTIL HFA/VENTOLIN HFA) 108 (90 BASE) MCG/ACT Inhaler  Self No Yes   Sig: Inhale 2 puffs into the lungs every 6 hours as needed for shortness of breath / dyspnea or wheezing   amLODIPine (NORVASC) 2.5 MG tablet 10/9/2017 at Unknown time Self No Yes   Sig: Take 1 tablet (2.5 mg) by mouth daily   Patient taking differently: Take 2.5 mg by mouth daily Takes on non-dialysis days   amLODIPine (NORVASC) 5 MG tablet  Self Yes Yes   Sig: Take 5 mg by mouth daily as needed (only uses when blood pressure greater than 150-160)   atorvastatin (LIPITOR) 40 MG tablet 10/9/2017 at Unknown time Self Yes Yes   Sig: Take 40 mg by mouth At Bedtime   chlorhexidine (CHLORHEXIDINE) 0.12 % solution 10/10/2017 at Unknown time Self Yes Yes   Sig: Rinse and gargle 15 ml by mouth twice a day as directed.   dapsone 100 MG tablet 10/9/2017 at Unknown time Self Yes Yes   Sig: Take 100 mg by mouth At Bedtime    dolutegravir (TIVICAY) 50 MG tablet 10/9/2017 at Unknown time Self Yes Yes   Sig: Take 50 mg by mouth At Bedtime   epoetin brittni (EPOGEN,PROCRIT) 01343 UNIT/ML injection  Self Yes Yes   Sig: Inject 11,000 Units Subcutaneous three times a week WITH DIALYSIS   gabapentin (NEURONTIN) 300 MG capsule 10/10/2017 at Unknown time Self Yes Yes   Sig: Take 300 mg by mouth every morning    gabapentin (NEURONTIN) 300 MG capsule 10/9/2017 at Unknown time  "Self Yes Yes   Sig: Take 600 mg by mouth every evening   imiquimod (ALDARA) 5 % cream  Self Yes Yes   Sig: Apply topically as needed   insulin glargine (LANTUS) 100 UNIT/ML injection 10/9/2017 at Unknown time Self No Yes   Sig: Inject 36 Units Subcutaneous every morning   Patient taking differently: Inject 50 Units Subcutaneous daily Usually around middle of the day   insulin lispro (HUMALOG KWIKPEN) 100 UNIT/ML injection 10/10/2017 at Unknown time Self No Yes   Sig: Inject 5 Units Subcutaneous 3 times daily (before meals)   Patient taking differently: Inject 5-15 Units Subcutaneous 3 times daily (before meals)    iron sucrose (VENOFER) 20 MG/ML injection  Self Yes Yes   Sig: Inject 50 mg into the vein once a week WIth dialysis   isosorbide mononitrate (IMDUR) 30 MG 24 hr tablet 10/9/2017 at Unknown time Self No Yes   Sig: Take 3 tablets (90 mg) by mouth every evening   losartan (COZAAR) 100 MG tablet 10/9/2017 at Unknown time Self No Yes   Sig: Take 1 tablet (100 mg) by mouth At Bedtime   mycophenolate (CELLCEPT - GENERIC EQUIVALENT) 500 MG tablet 10/10/2017 at am Self Yes Yes   Sig: Take 500 mg by mouth 2 times daily On an empty stomach   nitroglycerin (NITROSTAT) 0.4 MG SL tablet  Self No Yes   Sig: One tablet under the tongue every 5 minutes if needed for chest pain. May repeat every 5 minutes for a maximum of 3 doses in 15 minutes\"   omeprazole (PRILOSEC) 40 MG capsule 10/9/2017 at Unknown time Self Yes Yes   Sig: Take 40 mg by mouth daily 1 hour before dinner   order for DME  Self No No   Sig: Equipment being ordered: Other: 4WW  Treatment Diagnosis: Decreased activity tolerance   ritonavir (NORVIR) 100 MG capsule 10/9/2017 at Unknown time Self Yes Yes   Sig: Take 1 capsule by mouth At Bedtime    terbinafine (LAMISIL AT) 1 % cream  Self Yes Yes   Sig: Apply topically 2 times daily as needed       Facility-Administered Medications: None       ALLERGIES:    Allergies   Allergen Reactions     Calcium Acetate " Other (See Comments)     Other reaction(s): Other, see comments  Pain in chest and back  Pain in chest area (sensitive skin)      Diagnostic X-Ray Materials Other (See Comments)     PN: renal failure     Lisinopril      Sulfa Drugs        PAST MEDICAL HISTORY:    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, recurrent     Bradycardia 5/28/2016     CAD S/P percutaneous coronary angioplasty 6/15/2015     Chest pain      CKD (chronic kidney disease)     Hemodialysis     Dilated aortic root (H) 5/6/2016     ESRD (end stage renal disease) on dialysis (H) 5/6/2016     Hemodialysis-associated hypotension 5/22/2016     Human immunodeficiency virus (HIV) disease      Hypertension 2010     Hypotension, unspecified hypotension type 5/22/2016     Impotence of organic origin      Increased prostate specific antigen (PSA) velocity 08/08/2016    Awaiting bx on blood thinner     Mixed hyperlipidemia      Near syncope 2016    with hemodialysis     NSTEMI (non-ST elevated myocardial infarction) (H) 12/2015, 5/2016     Pulmonary HTN     Mod     TIA (transient ischemic attack) 5/2016     Type 2 diabetes mellitus (H) age 50     Unstable angina (H) 3/4/2016       PAST SURGICAL HISTORY:    Past Surgical History:   Procedure Laterality Date     ANGIOGRAM  03-04-16    No culprit lesions, stents widely patent      ANGIOGRAM  05-06-16    Cutting balloon ptca=Diag     APPENDECTOMY  2000     CHOLECYSTECTOMY, LAPOROSCOPIC       COLOSTOMY  09/30/1999    Temporary for diverticulitis     HEART CATH, ANGIOPLASTY  08-18-16    LAD PCI. Stented with a 3.0 x 8 mm Xience Alpine stent.     STENT, CORONARY, S660 15/18  12/2015    VANITA=Diag, PTCA=LAD     STENT, CORONARY, S660 15/18  06/2015    VANITA=LAD       SOCIAL HISTORY:  Donald Guevaraido  reports that he has quit smoking. His smoking use included  Cigarettes. He has never used smokeless tobacco. He reports that he does not drink alcohol or use illicit drugs.    FAMILY HISTORY:    Family History   Problem Relation Age of Onset     HEART DISEASE Brother 40     CABG     KIDNEY DISEASE Sister      Hypertension Sister      HEART DISEASE Brother      Dilated aorta

## 2017-10-11 NOTE — PROGRESS NOTES
[facility]    Hospitalist Progress Note    Assessment & Plan   Donald Raza is a 67 year old male with a history of CAD who was admitted on 10/10/2017 for chest pain.    # Chest pain, concern for unstable angina  Patient described pain as like a screw  radiating to back.  It was present at rest and resolved after nitro concerning for possible unstable angina.  He does have a history of CAD with multiple PCIs.  Upon arrival the patient was started on a nitro drip.  Patient was initially not started on Heparin drip due to high risk of bleeding.  Serial troponins have been negative   - Appreciate cardiology seeing the patient and their recommendations.  They are planning to take the patient for urgent angiogram   - Heparin drip per cardiology  - Continue PTA Lipitor, Plavix, Cozaar     # ESRD  HD on T/Th/Sat  - Nephro consult for dialysis   - Continue Venofer weekly    # HIV  - Will continue home meds    # DM Type II with Neuropathy   On Lantus and Humalog prior to admission.   - Lantus 36 units when eating.  Will decrease to 1 time dose of 18 units today as NPO   - Novolog 5 Units QAC and SSI   - Continue neurontin   - On ARB    # GERD  - Continue PTA Prilosec    DVT Prophylaxis: Heparin drip   Code Status: Full Code    Disposition: Expected discharge tomorrow assuming no significant complications with cardiac catheterization     Manuelito Pacheco, DO  Text Page (7am to 6pm)    Interval History   Patient seen and examined.  He is currently chest pain free while on the nitro drip.  No difficulty breathing.  No fevers, chills, N/V.    -Data reviewed today: I reviewed all new labs and imaging results over the last 24 hours. I personally reviewed no images or EKG's today.    Physical Exam   Temp: 98.3  F (36.8  C) Temp src: Oral BP: 143/72   Heart Rate: 66 Resp: 16 SpO2: 92 % O2 Device: Nasal cannula Oxygen Delivery: 2 LPM  Vitals:    10/10/17 1843 10/11/17 0600   Weight: 90 kg (198 lb 6.6 oz)  86.6 kg (190 lb 14.7 oz)     Vital Signs with Ranges  Temp:  [98.2  F (36.8  C)-98.8  F (37.1  C)] 98.3  F (36.8  C)  Heart Rate:  [66-86] 66  Resp:  [7-27] 16  BP: (130-170)/(62-90) 143/72  SpO2:  [89 %-97 %] 92 %  I/O last 3 completed shifts:  In: 60 [P.O.:60]  Out: 150 [Urine:150]    Constitutional: Awake, alert, cooperative, no apparent distress  Respiratory: Clear to auscultation bilaterally, no crackles or wheezing  Cardiovascular: Regular rate and rhythm, normal S1 and S2, and no murmur noted  GI: Normal bowel sounds, soft, non-distended, non-tender  Skin/Integumen: No rashes, no cyanosis, no edema    Medications     NaCl       ASPIRIN NOT PRESCRIBED       HEParin 1,250 Units/hr (10/11/17 1312)     - MEDICATION INSTRUCTIONS -       nitroGLYcerin 1.07 mcg/kg/min (10/11/17 1024)     Reason ACE/ARB order not selected       Reason beta blocker order not selected       - MEDICATION INSTRUCTIONS -       Reason anticoagulation order not selected         - MEDICATION INSTRUCTIONS for Dialysis Patients -   Does not apply See Admin Instructions     perflutren diluted 1mL to 1mL with saline  3 mL Intravenous Once     sodium chloride (PF)  10 mL Intravenous Once     sodium chloride (PF)  3 mL Intracatheter Q8H     insulin glargine  18 Units Subcutaneous Once     morphine (PF)  4 mg Intravenous Once     sodium chloride (PF)  3 mL Intracatheter Q8H     aspirin  325 mg Oral Daily     abacavir  600 mg Oral QPM     atorvastatin  40 mg Oral At Bedtime     vitamin B complex with vitamin C  1 tablet Oral At Bedtime     chlorhexidine  15 mL Mouth/Throat BID     clopidogrel (PLAVIX) tablet 75 mg  75 mg Oral QPM     dapsone  100 mg Oral At Bedtime     darunavir  800 mg Oral At Bedtime     dolutegravir  50 mg Oral At Bedtime     [START ON 10/12/2017] doxercalciferol  6 mcg Intravenous Once per day on Tue Thu Sat     [START ON 10/12/2017] epoetin brittni  11,000 Units Subcutaneous Once per day on Tue Thu Sat     gabapentin  300 mg Oral  QAM     gabapentin  600 mg Oral QPM     insulin glargine  36 Units Subcutaneous QAM     insulin aspart  5 Units Subcutaneous TID AC     [START ON 10/17/2017] iron sucrose  50 mg Intravenous Weekly     losartan  100 mg Oral At Bedtime     mycophenolate  500 mg Oral BID     magnesium oxide (MAG-OX) tablet 400 mg  400 mg Oral At Bedtime     omeprazole  40 mg Oral Daily     ritonavir  100 mg Oral At Bedtime     insulin aspart  1-3 Units Subcutaneous TID AC     insulin aspart  1-3 Units Subcutaneous At Bedtime       Data     Recent Labs  Lab 10/11/17  1303 10/11/17  0630 10/11/17  0220 10/10/17  2225 10/10/17  1914   WBC 5.2  --   --   --  6.2   HGB 8.3*  --   --   --  8.9*   MCV 88  --   --   --  88   *  --   --   --  143*   INR  --   --   --  0.95  --    NA  --   --   --   --  133   POTASSIUM  --   --   --   --  4.1   CHLORIDE  --   --   --   --  93*   CO2  --   --   --   --  31   BUN  --   --   --   --  29   CR  --   --   --   --  6.14*   ANIONGAP  --   --   --   --  9   PRABHA  --   --   --   --  9.2   GLC  --   --   --   --  235*   TROPI  --  0.016 0.017 0.016 0.019       Imaging:   Recent Results (from the past 24 hour(s))   XR Chest 2 Views    Narrative    CHEST TWO VIEWS October 10, 2017 11:29 PM     HISTORY: ACS/chest pain.    COMPARISON: 8/5/2017.      Impression    IMPRESSION: Cardiomegaly is present. Pulmonary vasculature is  minimally prominent but there is no pulmonary edema or any pleural  effusions. No infiltrates.    DAVID HERNANDEZ MD

## 2017-10-11 NOTE — PROGRESS NOTES
Pt transferred from ER to CCU on Nitroglycerin drip for chest pain 8/10, down to 1-2/10 at transport, then denied pain once settled into unit bed. 's/80's, SR 70-80's. Pt went down for stat CXR (ordered in ER) with the flier at ~2300, chest pain was lingering at 2/10, so NTG gtt increased per eMAR. Upon return, NTG gtt steadily increased per eMAR to attempt to reach SBP goal of <120. While asleep, pt's O2 down to 88% with oxygen placed at 2L/nc. Pt is NPO except ice chips, voids per urinal. Pt had hemodialysis today and states he doesn't make much urine. Usually walks with a cane, has been steady on his feet here. Pt has a hx of multiple angiograms and 7 stents total with reocclusions in the past. Will continue to monitor.

## 2017-10-11 NOTE — CONSULTS
CARDIOLOGY CONSULTATION      REFERRAL SOURCE:  Hui Kumari MD.        REASON FOR CONSULTATION:  Unstable angina.      HISTORY OF PRESENT ILLNESS:  Donald Raza is a 67-year-old gentleman with known coronary artery disease, who is under the established care of my cardiologist partner, Arnoldo Diaz MD, and DEDRICK Ortiz CNP.  He was admitted last night with recurrent chest pain reminiscent of his angina with no ischemic ECG changes and a borderline flat troponin I of 0.016-0.017 (in the context of dialysis-dependent end-stage renal disease).  He is known to have coronary artery disease, status post multiple previous stents (the most recent for diagonal in-stent restenosis in 08/2016), end-stage renal disease on dialysis (Tuesday, Thursday and Saturday, left AV fistula), hypertension, HIV positive state and mixed hyperlipidemia.      His cardiac history is extensive, dating back to 06/2015 when he had a LAD stent, followed by an NSTEMI and repeat stenting to the ostial diagonal vessel.  A year later, he had another NSTEMI and underwent stenting treatment for severe in-stent restenosis of the diagonal vessel in 08/2016.  For ongoing chest pain, he underwent repeat coronary angiogram earlier this year in 01/2017 and was found to have no significant disease with mild in-stent restenosis of the LAD stent, but otherwise unremarkable vessels.  Earlier this year in 06/2017, he saw Juany Hinojosa in the clinic and reported recurrence of his angina, CCS class II, which is historically chest pain or upper back pain.  A pharmacological nuclear stress test in 06/2017 showed no reversible ischemia.  He saw Dr. Diaz again in the clinic a couple of months ago, who increased his Imdur to 90 mg daily and reiterated that he continue dual antiplatelet therapy.      However, the patient reports that he has not been taking aspirin for several months because he understood this was what was told to him.  Since yesterday, he has been  having recurrent chest pain radiating to the back, typical of his angina, responsive to sublingual nitroglycerin.  After some time, the pain became persistent and he came to the Emergency Room, where his ECG did not show ischemic changes.  Troponin I was flat, but he had ongoing chest pain, requiring IV nitroglycerin to keep it under control.  He has been hemodynamically stable.  His blood pressure is 134/76, heart rate 73 and saturations 92% on room air.  He was comfortable when I visited with him.      Please see my attached note in Epic for review of systems, medications, allergies, past medical history, past surgical history, social history and family history.      PHYSICAL EXAMINATION:   VITAL SIGNS:  Temperature is 98.3 Fahrenheit, blood pressure 134/76, heart rate 73 BPM and regular, respiratory rate 12 per minute and saturations 92% on room air.  Weight is 90 kg.   CONSTITUTIONAL:  Mildly obese body habitus.  He is comfortable at rest without ongoing chest pain on IV nitroglycerin.  He is cooperative, alert, oriented, well-developed and well-nourished.   VASCULAR:  He has a palpable left brachial AV fistula with bruit.   EYES:  No pallor, icterus or xanthelasma.     ENT:  Satisfactory dentition.  No cyanosis or pallor.   NECK:  No thyromegaly.   RESPIRATORY:  Normal respiratory effort.  Bilateral normal breath sounds.  No rales or wheeze.   CARDIOVASCULAR:  Normal jugular venous pressure.  No carotid bruit.  Apical impulse is normal to palpation.  No parasternal heave or thrill.  Heart sounds are normal and regular.  No murmur.  No S3 or S4.   ABDOMEN:  Soft and nontender.  No hepatosplenomegaly or masses.  Active bowel sounds.   MUSCULOSKELETAL:  Normal muscle tone and strength.     NEUROPSYCHIATRIC:  Oriented to time, place and person.  Normal affect.  No gross motor deficits.   EXTREMITIES:  No edema, clubbing or deformities.      DIAGNOSTIC DATA:  The patient was dialyzed yesterday.  Labs show a sodium  of 133, potassium of 4.1, BUN of 29 and creatinine of 6.14.  Troponin I has been 0.019, 0.016, 0.017 and 0.016.  Glucose is 137.  Hemoglobin is 8.9 and platelets 143,000.      ECG:  Sinus rhythm at 75 BPM.  Inferior Q-waves.  No acute ischemic changes.  There is less than 0.5 mm ST elevation in leads V1 and V2 with inverted T-wave in lead V3.  This is new compared to his ECG on 08/05/2017.      Limited transthoracic echocardiogram was done today.  I personally reviewed the images.  Moderate concentric left ventricular hypertrophy with normal systolic function.  LVEF of 60-65%.  Normal RV size and function.  Aortic valve sclerosis without stenosis.  Mild to moderate mitral regurgitation.      Pharmacological nuclear stress test on 06/09/2017 showed no reversible ischemia.      Coronary angiogram in 01/2017 is as documented in the HPI.      DIAGNOSES:   1.  Unstable angina in the context of discontinuation of aspirin use for several months.   2.  Known coronary artery disease, status post multiple previous stents and in-stent restenosis with most recent angiogram in 01/2017 showing no significant disease with mild in-stent restenosis of the LAD stent.   3.  End-stage renal disease, on dialysis via left AV fistula.     4.  HIV positive status.   5.  Type 2 diabetes, on insulin therapy.   6.  Hypertension with a history of labile blood pressure jayden-hemodialysis.   7.  Dyslipidemia.      ASSESSMENT:  This is a 67-year-old gentleman who presents with unstable angina, no significant troponin increase and subtle changes in his ECG suggesting LAD territory lesion.  Notably, he has not been taking aspirin for several months, but has been on daily clopidogrel 75 mg.  He thought he was told that he may discontinue aspirin.   However, Dr. Diaz' note from 08/2017 clearly reiterates that he should be on dual antiplatelet therapy.  His echocardiogram does not show any regional wall motion abnormalities.  He does have mild  ascending aorta dilatation of 3.8 cm, but the clinical probability of dissection is lower in the differential as his pain is responsive to nitroglycerin and very typical of his historical angina.      RECOMMENDATIONS:   1.  Diagnostic coronary angiography and stenting if needed.  He might have had progression of his LAD in-stent restenosis.   2.  Start IV heparin.   3.  He was reloaded with aspirin earlier today.  Continue clopidogrel.   4.  Of note, left radial arterial access is not an option on him because of the presence of left arm AV fistula.   5.  Cardiology will follow.         MAYA POWELL MD             D: 10/11/2017 13:14   T: 10/11/2017 14:20   MT: [id number]      Name:     GREGORIO BRUSH   MRN:      [id number]        Account:       [id number]   :      1950           Consult Date:  10/11/2017      Document: [id number]

## 2017-10-11 NOTE — PLAN OF CARE
Problem: Patient Care Overview  Goal: Plan of Care/Patient Progress Review  OT/CR: Per discussion with RN, not appropriate for OT/CR at this time. Awaiting cardiology consult and full work-up.

## 2017-10-11 NOTE — H&P
[facility]    History and Physical  Hospitalist       Date of Admission:  10/10/2017  Date of Service (when I saw the patient): 10/10/17    Assessment & Plan   Donald Raza is a 67 year old male who presents with chest pain in the setting of severe CAD.    1. Chest pain. I suspect unstable angina. Initial EKG and troponin is wnl, which is reassuring. However, his extensive hx of PCI/CAD puts him at high risk, as well as the fact that this CP is new, at rest. Previous episode was 4 months ago.  His CP has not improved with PO nitro. We will start him on nitro gtt and IV morphine prn. We will hold his imdur, amlodipine. OK to continue ARB. He is at high risk for bleed (given hx of multiple lifethreatening bleeds), so we will hold hep gtt at this point given normal trop/ekg. Continue xarelto. Cardiology consult in am. NPO after MN in case he needs an angiogram. Telemetry. Trend troponin/EKG. Echo. CXR.    2. ESRD. Continue HD T/Th/Sat. Continue PTA meds. Will consult renal.    3. DM2. Continue home insulin regimen for now, lantus and lispro. We will adjusted as needed.    4. HIV. Continue home meds.    DVT Prophylaxis: Pneumatic Compression Devices  Code Status: Full Code    Disposition: Expected discharge in 2-3 days once cardiac workup complete.    Hui Kumari MD    Primary Care Physician   Aida Thomas    Chief Complaint   Chest pain    History is obtained from the patient    History of Present Illness   Donald Raza is a 67 year old male who presents with extensive history of CAD/multiple PCIs,  end-stage renal disease on hemodialysis, HIV, hypertension, dyslipidemia with significant hypertriglyceridemia, diabetes, history of life threatening bleeding, who presents with chest pain. Pt states that his previous episode of CP was 4-5 months ago. He had dialysis today and following dialysis, he went home to lie down. Around 4-5 pm he experienced severe 7/10 left sided CP (like a screw  ) radiating to his back. He took a nitro and CP abated, however recurred 30 minutes later. He took another nitro and called 911. He currently continues to have L sided CP without radiation about 4/10. He feels lightheaded, but no diaphoresis, palpitations, or syncope. No weight changes. No PND, orthopnea, leg swelling. He does report some SOB, but not severe. He feels cold.     In the ER, pt had baseline EKG and normal trop x1. Given ongoing CP, he was started on IV morphine and nitro gtt.    Past Medical History    I have reviewed this patient's medical history and updated it with pertinent information if needed.   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, recurrent     Bradycardia 5/28/2016     CAD S/P percutaneous coronary angioplasty 6/15/2015     Chest pain      CKD (chronic kidney disease)     Hemodialysis     Dilated aortic root (H) 5/6/2016     ESRD (end stage renal disease) on dialysis (H) 5/6/2016     Hemodialysis-associated hypotension 5/22/2016     Human immunodeficiency virus (HIV) disease      Hypertension 2010     Hypotension, unspecified hypotension type 5/22/2016     Impotence of organic origin      Increased prostate specific antigen (PSA) velocity 08/08/2016    Awaiting bx on blood thinner     Mixed hyperlipidemia      Near syncope 2016    with hemodialysis     NSTEMI (non-ST elevated myocardial infarction) (H) 12/2015, 5/2016     Pulmonary HTN     Mod     TIA (transient ischemic attack) 5/2016     Type 2 diabetes mellitus (H) age 50     Unstable angina (H) 3/4/2016       Past Surgical History   I have reviewed this patient's surgical history and updated it with pertinent information if needed.  Past Surgical History:   Procedure Laterality Date     ANGIOGRAM  03-04-16    No culprit lesions, stents widely patent      ANGIOGRAM   16    Cutting balloon ptca=Diag     APPENDECTOMY       CHOLECYSTECTOMY, LAPOROSCOPIC       COLOSTOMY  1999    Temporary for diverticulitis     HEART CATH, ANGIOPLASTY  16    LAD PCI. Stented with a 3.0 x 8 mm Xience Alpine stent.     STENT, CORONARY, S660 15/18  2015    VANITA=Diag, PTCA=LAD     STENT, CORONARY, S660 15/18  2015    VANITA=LAD       Prior to Admission Medications   Prior to Admission Medications   Prescriptions Last Dose Informant Patient Reported? Taking?   B COMPLEX-C-FOLIC ACID PO 10/9/2017 at Unknown time Self Yes Yes   Sig: Take 1 tablet by mouth At Bedtime    CLONAZEPAM PO  Self Yes Yes   Sig: Take 0.5 mg by mouth nightly as needed for anxiety (restless legs)   CLOPIDOGREL BISULFATE PO 10/9/2017 at Unknown time Self Yes Yes   Sig: Take 75 mg by mouth every evening    DOXERCALCIFEROL IV  Self Yes Yes   Sig: Inject 6 mcg into the vein three times a week (with dialysis)   Darunavir Ethanolate (PREZISTA PO) 10/9/2017 at Unknown time Self Yes Yes   Sig: Take 800 mg by mouth At Bedtime.   HYDRALAZINE HCL PO 10/10/2017 at am Self Yes Yes   Sig: Take 25 mg by mouth 2 times daily as needed for high blood pressure   MAGNESIUM OXIDE PO 10/9/2017 at Unknown time Self Yes Yes   Sig: Take 400 mg by mouth At Bedtime   Nutritional Supplements (NEPRO PO)  Self Yes No   Si-3 times daily   abacavir (ZIAGEN) 300 MG tablet 10/9/2017 at Unknown time Self Yes Yes   Sig: Take 600 mg by mouth every evening    albuterol (PROAIR HFA/PROVENTIL HFA/VENTOLIN HFA) 108 (90 BASE) MCG/ACT Inhaler  Self No Yes   Sig: Inhale 2 puffs into the lungs every 6 hours as needed for shortness of breath / dyspnea or wheezing   amLODIPine (NORVASC) 2.5 MG tablet 10/9/2017 at Unknown time Self No Yes   Sig: Take 1 tablet (2.5 mg) by mouth daily   Patient taking differently: Take 2.5 mg by mouth daily Takes on non-dialysis days   amLODIPine (NORVASC) 5 MG tablet  Self Yes Yes   Sig: Take 5 mg by mouth daily as  needed (only uses when blood pressure greater than 150-160)   atorvastatin (LIPITOR) 40 MG tablet 10/9/2017 at Unknown time Self Yes Yes   Sig: Take 40 mg by mouth At Bedtime   chlorhexidine (CHLORHEXIDINE) 0.12 % solution 10/10/2017 at Unknown time Self Yes Yes   Sig: Rinse and gargle 15 ml by mouth twice a day as directed.   dapsone 100 MG tablet 10/9/2017 at Unknown time Self Yes Yes   Sig: Take 100 mg by mouth At Bedtime    dolutegravir (TIVICAY) 50 MG tablet 10/9/2017 at Unknown time Self Yes Yes   Sig: Take 50 mg by mouth At Bedtime   epoetin brittni (EPOGEN,PROCRIT) 05702 UNIT/ML injection  Self Yes Yes   Sig: Inject 11,000 Units Subcutaneous three times a week WITH DIALYSIS   gabapentin (NEURONTIN) 300 MG capsule 10/10/2017 at Unknown time Self Yes Yes   Sig: Take 300 mg by mouth every morning    gabapentin (NEURONTIN) 300 MG capsule 10/9/2017 at Unknown time Self Yes Yes   Sig: Take 600 mg by mouth every evening   imiquimod (ALDARA) 5 % cream  Self Yes Yes   Sig: Apply topically as needed   insulin glargine (LANTUS) 100 UNIT/ML injection 10/9/2017 at Unknown time Self No Yes   Sig: Inject 36 Units Subcutaneous every morning   Patient taking differently: Inject 50 Units Subcutaneous daily Usually around middle of the day   insulin lispro (HUMALOG KWIKPEN) 100 UNIT/ML injection 10/10/2017 at Unknown time Self No Yes   Sig: Inject 5 Units Subcutaneous 3 times daily (before meals)   Patient taking differently: Inject 5-15 Units Subcutaneous 3 times daily (before meals)    iron sucrose (VENOFER) 20 MG/ML injection  Self Yes Yes   Sig: Inject 50 mg into the vein once a week WIth dialysis   isosorbide mononitrate (IMDUR) 30 MG 24 hr tablet 10/9/2017 at Unknown time Self No Yes   Sig: Take 3 tablets (90 mg) by mouth every evening   losartan (COZAAR) 100 MG tablet 10/9/2017 at Unknown time Self No Yes   Sig: Take 1 tablet (100 mg) by mouth At Bedtime   mycophenolate (CELLCEPT - GENERIC EQUIVALENT) 500 MG tablet  "10/10/2017 at am Self Yes Yes   Sig: Take 500 mg by mouth 2 times daily On an empty stomach   nitroglycerin (NITROSTAT) 0.4 MG SL tablet  Self No Yes   Sig: One tablet under the tongue every 5 minutes if needed for chest pain. May repeat every 5 minutes for a maximum of 3 doses in 15 minutes\"   omeprazole (PRILOSEC) 40 MG capsule 10/9/2017 at Unknown time Self Yes Yes   Sig: Take 40 mg by mouth daily 1 hour before dinner   order for DME  Self No No   Sig: Equipment being ordered: Other: 4WW  Treatment Diagnosis: Decreased activity tolerance   ritonavir (NORVIR) 100 MG capsule 10/9/2017 at Unknown time Self Yes Yes   Sig: Take 1 capsule by mouth At Bedtime    terbinafine (LAMISIL AT) 1 % cream  Self Yes Yes   Sig: Apply topically 2 times daily as needed       Facility-Administered Medications: None     Allergies   Allergies   Allergen Reactions     Calcium Acetate Other (See Comments)     Other reaction(s): Other, see comments  Pain in chest and back  Pain in chest area (sensitive skin)      Diagnostic X-Ray Materials Other (See Comments)     PN: renal failure     Lisinopril      Sulfa Drugs        Social History   I have reviewed this patient's social history and updated it with pertinent information if needed. Donald Raza  reports that he has quit smoking. His smoking use included Cigarettes. He has never used smokeless tobacco. He reports that he does not drink alcohol or use illicit drugs. Aprox 80 pack year smoking hx but quit 20 yrs ago.    Family History   I have reviewed this patient's family history and updated it with pertinent information if needed.   Family History   Problem Relation Age of Onset     HEART DISEASE Brother 40     CABG     KIDNEY DISEASE Sister      Hypertension Sister      HEART DISEASE Brother      Dilated aorta       Review of Systems   The 10 point Review of Systems is negative other than noted in the HPI or here.     Physical Exam   Temp: 98.8  F (37.1  C) Temp src: Oral BP: 145/71  "  Heart Rate: 73 Resp: 24 SpO2: 92 % O2 Device: None (Room air)    Vital Signs with Ranges  Temp:  [98.8  F (37.1  C)] 98.8  F (37.1  C)  Heart Rate:  [69-75] 73  Resp:  [8-24] 24  BP: (145-158)/(71-85) 145/71  SpO2:  [92 %-97 %] 92 %  198 lbs 6.62 oz    Constitutional: NAD, in bed  Eyes: PERRL  HEENT: neck is supple, no LDA, no thyromegaly  Respiratory: CTAB, no rhonchi or wheezes  Cardiovascular: systolic murmur at RUSB, RRR, brisk peripheral pulses  GI: soft, mildly tender with deep palp, normal BS  Lymph/Hematologic: No bruising, no LDA  Skin: warm and dry, HD site on L arm c/d/i  Musculoskeletal: no joint swelling or ttp  Neurologic: + lack of sensation on bilateral toes  Psychiatric: normal mood and affect    Data   Data reviewed today:  I personally reviewed the EKG tracing showing SR at 75, no STW changes or elevations.    Recent Labs  Lab 10/10/17  1914   WBC 6.2   HGB 8.9*   MCV 88   *      POTASSIUM 4.1   CHLORIDE 93*   CO2 31   BUN 29   CR 6.14*   ANIONGAP 9   PRABHA 9.2   *   TROPI 0.019       No results found for this or any previous visit (from the past 24 hour(s)).

## 2017-10-11 NOTE — PHARMACY-ADMISSION MEDICATION HISTORY
Admission medication history interview status for the 10/10/2017  admission is complete. See EPIC admission navigator for prior to admission medications     Medication history source reliability:Good    Actions taken by pharmacist (provider contacted, etc):None   -- Pt stated that since discharged from here on 9/20/17, nothing has changed. Discussed Lantus dosing, and apparently he uses 50 units in the middle of the day based on BG.    Additional medication history information not noted on PTA med list :None    Medication reconciliation/reorder completed by provider prior to medication history? No    Time spent in this activity: 20 min    Prior to Admission medications    Medication Sig Last Dose Taking? Auth Provider   isosorbide mononitrate (IMDUR) 30 MG 24 hr tablet Take 3 tablets (90 mg) by mouth every evening 10/9/2017 at Unknown time Yes Arnoldo Diaz MD   B COMPLEX-C-FOLIC ACID PO Take 1 tablet by mouth At Bedtime  10/9/2017 at Unknown time Yes Reported, Patient   insulin glargine (LANTUS) 100 UNIT/ML injection Inject 36 Units Subcutaneous every morning  Patient taking differently: Inject 50 Units Subcutaneous daily Usually around middle of the day 10/9/2017 at Unknown time Yes Clemencia Pal MD   amLODIPine (NORVASC) 2.5 MG tablet Take 1 tablet (2.5 mg) by mouth daily  Patient taking differently: Take 2.5 mg by mouth daily Takes on non-dialysis days 10/9/2017 at Unknown time Yes Clemencia Pal MD   amLODIPine (NORVASC) 5 MG tablet Take 5 mg by mouth daily as needed (only uses when blood pressure greater than 150-160)  Yes Unknown, Entered By History   HYDRALAZINE HCL PO Take 25 mg by mouth 2 times daily as needed for high blood pressure 10/10/2017 at am Yes Unknown, Entered By History   albuterol (PROAIR HFA/PROVENTIL HFA/VENTOLIN HFA) 108 (90 BASE) MCG/ACT Inhaler Inhale 2 puffs into the lungs every 6 hours as needed for shortness of breath / dyspnea or wheezing  Yes Nelson Worthy MD    omeprazole (PRILOSEC) 40 MG capsule Take 40 mg by mouth daily 1 hour before dinner 10/9/2017 at Unknown time Yes Unknown, Entered By History   insulin lispro (HUMALOG KWIKPEN) 100 UNIT/ML injection Inject 5 Units Subcutaneous 3 times daily (before meals)  Patient taking differently: Inject 5-15 Units Subcutaneous 3 times daily (before meals)  10/10/2017 at Unknown time Yes Lorna Jhaveri MD   mycophenolate (CELLCEPT - GENERIC EQUIVALENT) 500 MG tablet Take 500 mg by mouth 2 times daily On an empty stomach 10/10/2017 at am Yes Reported, Patient   losartan (COZAAR) 100 MG tablet Take 1 tablet (100 mg) by mouth At Bedtime 10/9/2017 at Unknown time Yes Glenna Fernández MD   gabapentin (NEURONTIN) 300 MG capsule Take 600 mg by mouth every evening 10/9/2017 at Unknown time Yes Unknown, Entered By History   imiquimod (ALDARA) 5 % cream Apply topically as needed  Yes Reported, Patient   DOXERCALCIFEROL IV Inject 6 mcg into the vein three times a week (with dialysis)  Yes Reported, Patient   CLONAZEPAM PO Take 0.5 mg by mouth nightly as needed for anxiety (restless legs)  Yes Unknown, Entered By History   CLOPIDOGREL BISULFATE PO Take 75 mg by mouth every evening  10/9/2017 at Unknown time Yes Unknown, Entered By History   abacavir (ZIAGEN) 300 MG tablet Take 600 mg by mouth every evening  10/9/2017 at Unknown time Yes Abstract, Provider   chlorhexidine (CHLORHEXIDINE) 0.12 % solution Rinse and gargle 15 ml by mouth twice a day as directed. 10/10/2017 at Unknown time Yes Abstract, Provider   terbinafine (LAMISIL AT) 1 % cream Apply topically 2 times daily as needed   Yes Abstract, Provider   atorvastatin (LIPITOR) 40 MG tablet Take 40 mg by mouth At Bedtime 10/9/2017 at Unknown time Yes Unknown, Entered By History   epoetin brittni (EPOGEN,PROCRIT) 83146 UNIT/ML injection Inject 11,000 Units Subcutaneous three times a week WITH DIALYSIS  Yes Unknown, Entered By History   iron sucrose (VENOFER) 20 MG/ML injection  "Inject 50 mg into the vein once a week WIth dialysis  Yes Unknown, Entered By History   MAGNESIUM OXIDE PO Take 400 mg by mouth At Bedtime 10/9/2017 at Unknown time Yes Unknown, Entered By History   dolutegravir (TIVICAY) 50 MG tablet Take 50 mg by mouth At Bedtime 10/9/2017 at Unknown time Yes Unknown, Entered By History   nitroglycerin (NITROSTAT) 0.4 MG SL tablet One tablet under the tongue every 5 minutes if needed for chest pain. May repeat every 5 minutes for a maximum of 3 doses in 15 minutes\"  Yes Lay Paz MD   gabapentin (NEURONTIN) 300 MG capsule Take 300 mg by mouth every morning  10/10/2017 at Unknown time Yes Unknown, Entered By History   dapsone 100 MG tablet Take 100 mg by mouth At Bedtime  10/9/2017 at Unknown time Yes Reported, Patient   Darunavir Ethanolate (PREZISTA PO) Take 800 mg by mouth At Bedtime. 10/9/2017 at Unknown time Yes Reported, Patient   ritonavir (NORVIR) 100 MG capsule Take 1 capsule by mouth At Bedtime  10/9/2017 at Unknown time Yes Reported, Patient   Nutritional Supplements (NEPRO PO) 1-3 times daily   Unknown, Entered By History   order for DME Equipment being ordered: Other: 4WW  Treatment Diagnosis: Decreased activity tolerance   Lorna Jhaveri MD         "

## 2017-10-11 NOTE — PLAN OF CARE
Problem: Patient Care Overview  Goal: Plan of Care/Patient Progress Review  Outcome: No Change  Pt A/O x4, continued with left chest pain 1/10 most of day with Nitro gtt running. Heparin started about 1315. In cath lab now.

## 2017-10-12 VITALS
OXYGEN SATURATION: 90 % | SYSTOLIC BLOOD PRESSURE: 156 MMHG | WEIGHT: 197.97 LBS | TEMPERATURE: 97.7 F | BODY MASS INDEX: 27.72 KG/M2 | HEIGHT: 71 IN | DIASTOLIC BLOOD PRESSURE: 79 MMHG | HEART RATE: 65 BPM | RESPIRATION RATE: 16 BRPM

## 2017-10-12 PROBLEM — I24.9 ACS (ACUTE CORONARY SYNDROME) (H): Status: RESOLVED | Noted: 2017-10-10 | Resolved: 2017-10-12

## 2017-10-12 LAB
GLUCOSE BLDC GLUCOMTR-MCNC: 111 MG/DL (ref 70–99)
GLUCOSE BLDC GLUCOMTR-MCNC: 142 MG/DL (ref 70–99)
GLUCOSE BLDC GLUCOMTR-MCNC: 156 MG/DL (ref 70–99)
GLUCOSE BLDC GLUCOMTR-MCNC: 230 MG/DL (ref 70–99)
GLUCOSE BLDC GLUCOMTR-MCNC: 58 MG/DL (ref 70–99)

## 2017-10-12 PROCEDURE — 99238 HOSP IP/OBS DSCHRG MGMT 30/<: CPT | Performed by: INTERNAL MEDICINE

## 2017-10-12 PROCEDURE — 25000128 H RX IP 250 OP 636: Performed by: INTERNAL MEDICINE

## 2017-10-12 PROCEDURE — 00000146 ZZHCL STATISTIC GLUCOSE BY METER IP

## 2017-10-12 PROCEDURE — A9270 NON-COVERED ITEM OR SERVICE: HCPCS | Mod: GY | Performed by: INTERNAL MEDICINE

## 2017-10-12 PROCEDURE — 90937 HEMODIALYSIS REPEATED EVAL: CPT

## 2017-10-12 PROCEDURE — 25000131 ZZH RX MED GY IP 250 OP 636 PS 637: Mod: GY | Performed by: INTERNAL MEDICINE

## 2017-10-12 PROCEDURE — 25000132 ZZH RX MED GY IP 250 OP 250 PS 637: Mod: GY | Performed by: INTERNAL MEDICINE

## 2017-10-12 PROCEDURE — 99232 SBSQ HOSP IP/OBS MODERATE 35: CPT | Performed by: INTERNAL MEDICINE

## 2017-10-12 PROCEDURE — 63400005 ZZH RX 634: Performed by: INTERNAL MEDICINE

## 2017-10-12 RX ORDER — HEPARIN SODIUM 1000 [USP'U]/ML
500 INJECTION, SOLUTION INTRAVENOUS; SUBCUTANEOUS
Status: DISCONTINUED | OUTPATIENT
Start: 2017-10-12 | End: 2017-10-12

## 2017-10-12 RX ORDER — ALBUMIN (HUMAN) 12.5 G/50ML
50 SOLUTION INTRAVENOUS
Status: DISCONTINUED | OUTPATIENT
Start: 2017-10-12 | End: 2017-10-12

## 2017-10-12 RX ORDER — HEPARIN SODIUM 1000 [USP'U]/ML
500 INJECTION, SOLUTION INTRAVENOUS; SUBCUTANEOUS CONTINUOUS
Status: DISCONTINUED | OUTPATIENT
Start: 2017-10-12 | End: 2017-10-12

## 2017-10-12 RX ORDER — DOXERCALCIFEROL 4 UG/2ML
4 INJECTION INTRAVENOUS SEE ADMIN INSTRUCTIONS
Status: DISCONTINUED | OUTPATIENT
Start: 2017-10-12 | End: 2017-10-12

## 2017-10-12 RX ADMIN — ASPIRIN 81 MG: 81 TABLET, COATED ORAL at 14:30

## 2017-10-12 RX ADMIN — GABAPENTIN 300 MG: 300 CAPSULE ORAL at 14:30

## 2017-10-12 RX ADMIN — INSULIN ASPART 5 UNITS: 100 INJECTION, SOLUTION INTRAVENOUS; SUBCUTANEOUS at 17:38

## 2017-10-12 RX ADMIN — INSULIN GLARGINE 36 UNITS: 100 INJECTION, SOLUTION SUBCUTANEOUS at 10:29

## 2017-10-12 RX ADMIN — DOXERCALCIFEROL 4 MCG: 2 INJECTION, SOLUTION INTRAVENOUS at 09:09

## 2017-10-12 RX ADMIN — SODIUM CHLORIDE 250 ML: 9 INJECTION, SOLUTION INTRAVENOUS at 08:22

## 2017-10-12 RX ADMIN — SODIUM CHLORIDE 250 ML: 9 INJECTION, SOLUTION INTRAVENOUS at 08:21

## 2017-10-12 RX ADMIN — INSULIN ASPART 1 UNITS: 100 INJECTION, SOLUTION INTRAVENOUS; SUBCUTANEOUS at 17:38

## 2017-10-12 RX ADMIN — EPOETIN ALFA 10000 UNITS: 10000 SOLUTION INTRAVENOUS; SUBCUTANEOUS at 10:03

## 2017-10-12 RX ADMIN — OMEPRAZOLE 40 MG: 20 CAPSULE, DELAYED RELEASE ORAL at 17:41

## 2017-10-12 NOTE — PLAN OF CARE
Problem: Patient Care Overview  Goal: Plan of Care/Patient Progress Review  OT/CR: Orders rec'd. Patient admitted with chest pain which was deemed non cardiac in nature. Patient has been up I'ly per nurse, is discharging shortly. CR eval not indicated.

## 2017-10-12 NOTE — PROGRESS NOTES
Pt slept most of the night, denies pain. Right groin soft, flat, dry and intact. Up to the commode for liquid BM during the night. No void, pt will have hemodialysis today. VSS

## 2017-10-12 NOTE — PROGRESS NOTES
[facility]    Cardiology Progress Note     Harris Dixon MD    INTERVAL HISTORY:  Had an uncomplicated diagnostic coronary and 2 g via right femoral arterial access yesterday. Did not reveal any significant stenosis. The LAD stents are patent. The ostium of the diagonal branch had a stenosis that was negative by FFR. Therefore there was no lesion to stent. Patient dialyzed today and 2 L of fluid was removed. His chest pain has resolved. He states that he now has an almost constant upper back pain which sounds musculoskeletal and is localized to the scapula.    Comfortable, alert and oriented on exam, heart sounds are regular, no audible murmur, lungs are clear. Right femoral arterial site has a expected tenderness but no hematoma or bruit. No lower extremity edema.    DIAGNOSES:   1.  Noncardiac chest pain with patent stents and no significant coronary artery disease on coronary angiogram 10/11/2017 P   2.  End-stage renal disease, on dialysis via left AV fistula.     3.  HIV positive status.   4.  Type 2 diabetes, on insulin therapy.   5.  Hypertension with a history of labile blood pressure jayden-hemodialysis.   6.  Dyslipidemia.     ASSESSMENT/PLAN:  1. I reiterated to the patient that his pain has been constant for several hours, his angiogram has not shown any significant disease. Therefore likely noncardiac chest pain. His stress test three months ago was also negative.  2. Long-term dual antiplatelet therapy with aspirin 81 mg and clopidogrel 75 mg.   3. No changes to any of his other cardiac medications.   4. Discharge to home today.  5. Follow-up with cardiology RENETTA or his established cardiologist, Dr. Arnoldo Diaz in 3-4 weeks.          LABORATORY DATA:    Recent Labs  Lab 10/11/17  1303 10/11/17  0630 10/11/17  0220 10/10/17  2225 10/10/17  1914   WBC 5.2  --   --   --  6.2   HGB 8.3*  --   --   --  8.9*   MCV 88  --   --   --  88   *  --   --   --  143*   INR  --   --   --   0.95  --    NA  --   --   --   --  133   POTASSIUM  --   --   --   --  4.1   CHLORIDE  --   --   --   --  93*   CO2  --   --   --   --  31   BUN  --   --   --   --  29   CR  --   --   --   --  6.14*   ANIONGAP  --   --   --   --  9   PRABHA  --   --   --   --  9.2   GLC  --   --   --   --  235*   TROPI  --  0.016 0.017 0.016 0.019         CURRENT MEDICATIONS:      NaCl Stopped (10/12/17 0400)     - MEDICATION INSTRUCTIONS -       Reason ACE/ARB order not selected       Reason beta blocker order not selected       - MEDICATION INSTRUCTIONS -       Reason anticoagulation order not selected         - MEDICATION INSTRUCTIONS for Dialysis Patients -   Does not apply See Admin Instructions     sodium chloride (PF)  3 mL Intracatheter Q8H     aspirin EC  81 mg Oral Daily     zz extemporaneous template  11,000 Units Subcutaneous Once per day on Tue Thu Sat     sodium chloride (PF)  3 mL Intracatheter Q8H     abacavir  600 mg Oral QPM     atorvastatin  40 mg Oral At Bedtime     vitamin B complex with vitamin C  1 tablet Oral At Bedtime     chlorhexidine  15 mL Mouth/Throat BID     clopidogrel (PLAVIX) tablet 75 mg  75 mg Oral QPM     dapsone  100 mg Oral At Bedtime     darunavir  800 mg Oral At Bedtime     dolutegravir  50 mg Oral At Bedtime     doxercalciferol  6 mcg Intravenous Once per day on Tue Thu Sat     gabapentin  300 mg Oral QAM     gabapentin  600 mg Oral QPM     insulin glargine  36 Units Subcutaneous QAM     insulin aspart  5 Units Subcutaneous TID AC     [START ON 10/17/2017] iron sucrose  50 mg Intravenous Weekly     losartan  100 mg Oral At Bedtime     mycophenolate  500 mg Oral BID     magnesium oxide (MAG-OX) tablet 400 mg  400 mg Oral At Bedtime     omeprazole  40 mg Oral Daily     ritonavir  100 mg Oral At Bedtime     insulin aspart  1-3 Units Subcutaneous TID AC     insulin aspart  1-3 Units Subcutaneous At Bedtime       VITAL SIGNS:  Temp: 98.2  F (36.8  C) Temp src: Oral BP: 145/69 Pulse: 65 Heart Rate:  65 Resp: 14 SpO2: 95 % O2 Device: None (Room air)    10/07 1500 - 10/12 1459  In: 451.44 [P.O.:210; I.V.:241.44]  Out: 2150 [Urine:150]  Net: -1698.56  Vitals:    10/10/17 1843 10/11/17 0600 10/12/17 0400   Weight: 90 kg (198 lb 6.6 oz) 86.6 kg (190 lb 14.7 oz) 89.8 kg (197 lb 15.6 oz)

## 2017-10-12 NOTE — PROGRESS NOTES
Assessment and Plan:   ESRD: running 3.5 h for 2 L UF. Using LAF with 400 BFR. 3K and 35 HCO3. Getting EPO and hectorol on the run.   Discharge per Cardiology with F/U at outpt dialysis.     Weights (last 365 days)      Date/Time Weight Who     10/12/17 0400 89.8 kg (197 lb 15.6 oz) BN     10/11/17 0600 86.6 kg (190 lb 14.7 oz) BN     10/10/17 1843 90 kg (198 lb 6.6 oz)                  Interval History:   ASCVD:  ? Angina. Rx with heparin and went to angio. No interventions.      DM  HIV  Hypertension             Review of Systems:   No sx on dialysis.           Medications:       epoetin brittni (EPOGEN,PROCRIT) inj ESRD  10,000 Units Intravenous See Admin Instructions     doxercalciferol  4 mcg Intravenous See Admin Instructions     heparin (porcine)  500 Units Hemodialysis Machine Once in dialysis     - MEDICATION INSTRUCTIONS for Dialysis Patients -   Does not apply See Admin Instructions     sodium chloride (PF)  3 mL Intracatheter Q8H     aspirin EC  81 mg Oral Daily     zz extemporaneous template  11,000 Units Subcutaneous Once per day on Tue Thu Sat     morphine (PF)  4 mg Intravenous Once     sodium chloride (PF)  3 mL Intracatheter Q8H     abacavir  600 mg Oral QPM     atorvastatin  40 mg Oral At Bedtime     vitamin B complex with vitamin C  1 tablet Oral At Bedtime     chlorhexidine  15 mL Mouth/Throat BID     clopidogrel (PLAVIX) tablet 75 mg  75 mg Oral QPM     dapsone  100 mg Oral At Bedtime     darunavir  800 mg Oral At Bedtime     dolutegravir  50 mg Oral At Bedtime     doxercalciferol  6 mcg Intravenous Once per day on Tue Thu Sat     gabapentin  300 mg Oral QAM     gabapentin  600 mg Oral QPM     insulin glargine  36 Units Subcutaneous QAM     insulin aspart  5 Units Subcutaneous TID AC     [START ON 10/17/2017] iron sucrose  50 mg Intravenous Weekly     losartan  100 mg Oral At Bedtime     mycophenolate  500 mg Oral BID     magnesium oxide (MAG-OX) tablet 400 mg  400 mg Oral At Bedtime      omeprazole  40 mg Oral Daily     ritonavir  100 mg Oral At Bedtime     insulin aspart  1-3 Units Subcutaneous TID AC     insulin aspart  1-3 Units Subcutaneous At Bedtime       heparin (porcine)       NaCl Stopped (10/12/17 0400)     - MEDICATION INSTRUCTIONS -       Reason ACE/ARB order not selected       Reason beta blocker order not selected       - MEDICATION INSTRUCTIONS -       Reason anticoagulation order not selected       Current active medications and PTA medications reviewed, see medication list for details.            Physical Exam:   Vitals were reviewed  Patient Vitals for the past 24 hrs:   BP Temp Temp src Pulse Heart Rate Resp SpO2 Weight   10/12/17 0930 165/80 - - 70 - - 95 % -   10/12/17 0900 159/74 - - 68 - - 96 % -   10/12/17 0845 159/74 - - 68 - - 95 % -   10/12/17 0830 142/63 - - 62 - - 96 % -   10/12/17 0815 136/69 - - 59 - - 95 % -   10/12/17 0800 129/67 - - 57 - - - -   10/12/17 0745 157/68 98  F (36.7  C) Oral 63 59 18 94 % -   10/12/17 0728 - 98  F (36.7  C) Oral - - - - -   10/12/17 0700 131/71 - - - 60 18 - -   10/12/17 0600 144/69 - - - 64 11 - -   10/12/17 0500 118/65 - - - 59 10 95 % -   10/12/17 0430 - - - - 62 10 96 % -   10/12/17 0400 150/80 98.1  F (36.7  C) Oral - 62 11 96 % 89.8 kg (197 lb 15.6 oz)   10/12/17 0330 - - - - - - 98 % -   10/12/17 0300 141/71 - - - 65 17 - -   10/12/17 0230 154/73 - - - 74 20 - -   10/12/17 0200 143/66 - - - 66 9 - -   10/12/17 0130 141/66 - - - 66 10 - -   10/12/17 0100 140/69 - - - 70 10 96 % -   10/12/17 0030 145/73 - - - 73 11 - -   10/12/17 0000 150/71 - - - 71 11 96 % -   10/11/17 2330 154/71 - - - 71 13 - -   10/11/17 2300 158/73 - - - 73 11 95 % -   10/11/17 2230 160/72 - - - 76 13 - -   10/11/17 2200 154/82 - - - 74 12 96 % -   10/11/17 2145 157/77 - - - 75 11 - -   10/11/17 2130 165/72 - - - 73 15 - -   10/11/17 2115 140/70 - - - 67 14 - -   10/11/17 2100 142/72 - - - 70 15 96 % -   10/11/17 2056 - 97.6  F (36.4  C) Oral - - - - -    10/11/17 2045 155/77 - - - 70 18 - -   10/11/17 1800 142/78 - - - 69 15 - -   10/11/17 1700 149/78 - - - 67 11 - -   10/11/17 1600 161/77 - - - 69 21 - -   10/11/17 1539 - 97.5  F (36.4  C) Oral - - - - -   10/11/17 1500 155/76 - - - 74 23 - -   10/11/17 1400 146/75 - - - 67 20 - -   10/11/17 1300 143/72 - - - 66 16 - -   10/11/17 1200 130/62 - - - 68 16 - -   10/11/17 1100 153/74 98.3  F (36.8  C) Oral - 68 22 - -   10/11/17 1000 135/70 - - - 69 - - -       Temp:  [97.5  F (36.4  C)-98.3  F (36.8  C)] 98  F (36.7  C)  Pulse:  [57-70] 70  Heart Rate:  [59-76] 59  Resp:  [9-23] 18  BP: (118-165)/(62-82) 165/80  SpO2:  [94 %-98 %] 95 %    Temperatures:  Current - Temp: 98  F (36.7  C); Max - Temp  Av.9  F (36.6  C)  Min: 97.5  F (36.4  C)  Max: 98.3  F (36.8  C)  Respiration range: Resp  Av.4  Min: 9  Max: 23  Pulse range: Pulse  Av.9  Min: 57  Max: 70  Blood pressure range: Systolic (24hrs), Av , Min:118 , Max:165   ; Diastolic (24hrs), Av, Min:62, Max:82    Pulse oximetry range: SpO2  Av.7 %  Min: 94 %  Max: 98 %    I/O last 3 completed shifts:  In: 391.44 [P.O.:150; I.V.:241.44]  Out: -       Intake/Output Summary (Last 24 hours) at 10/12/17 0953  Last data filed at 10/12/17 0800   Gross per 24 hour   Intake           199.33 ml   Output                0 ml   Net           199.33 ml       Resting comfortably in bed  LAF with needles in place       Wt Readings from Last 4 Encounters:   10/12/17 89.8 kg (197 lb 15.6 oz)   17 89.8 kg (198 lb)   17 90 kg (198 lb 6.6 oz)   17 90.3 kg (199 lb)          Data:          Lab Results   Component Value Date     10/10/2017     2017     2017    Lab Results   Component Value Date    CHLORIDE 93 10/10/2017    CHLORIDE 93 2017    CHLORIDE 93 2017    Lab Results   Component Value Date    BUN 29 10/10/2017    BUN 50 2017    BUN 35 2017      Lab Results   Component Value Date     POTASSIUM 4.1 10/10/2017    POTASSIUM 4.5 09/20/2017    POTASSIUM 3.8 09/19/2017    Lab Results   Component Value Date    CO2 31 10/10/2017    CO2 28 09/20/2017    CO2 29 09/19/2017    Lab Results   Component Value Date    CR 6.14 10/10/2017    CR 8.12 09/20/2017    CR 5.53 09/19/2017        Recent Labs   Lab Test  10/11/17   1303  10/10/17   1914  09/20/17   1211   WBC  5.2  6.2  4.3   HGB  8.3*  8.9*  9.4*   HCT  27.0*  28.2*  30.1*   MCV  88  88  88   PLT  127*  143*  155     Recent Labs   Lab Test  09/20/17   1211  08/09/17   0818  06/05/17   1138  04/15/17   0819  04/09/17   0620  03/28/17   1953   07/06/14   2255   AST  23   --    --   20   --   23   < >  18   ALT  23  22  21  22   --   29   < >  17   ALKPHOS  99   --    --   77   --   120   < >  132   BILITOTAL  0.6   --    --   0.4  0.3  0.6   < >  0.5   BILICONJ   --    --    --    --    --    --    --   0.0    < > = values in this interval not displayed.       Recent Labs   Lab Test  10/10/17   2225  04/15/17   0819  04/09/17   0620   MAG  1.9  2.8*  2.6*     Recent Labs   Lab Test  04/15/17   0819  04/11/17   0628  04/10/17   0729   PHOS  6.9*  4.9*  4.2     Recent Labs   Lab Test  10/10/17   1914  09/20/17   1211  09/19/17   1309   PRABHA  9.2  9.3  9.1       Lab Results   Component Value Date    PRABHA 9.2 10/10/2017     Lab Results   Component Value Date    WBC 5.2 10/11/2017    HGB 8.3 (L) 10/11/2017    HCT 27.0 (L) 10/11/2017    MCV 88 10/11/2017     (L) 10/11/2017     Lab Results   Component Value Date     10/10/2017    POTASSIUM 4.1 10/10/2017    CHLORIDE 93 (L) 10/10/2017    CO2 31 10/10/2017     (H) 10/10/2017     Lab Results   Component Value Date    BUN 29 10/10/2017    CR 6.14 (H) 10/10/2017     Lab Results   Component Value Date    MAG 1.9 10/10/2017     Lab Results   Component Value Date    PHOS 6.9 (H) 04/15/2017       Creatinine   Date Value Ref Range Status   10/10/2017 6.14 (H) 0.66 - 1.25 mg/dL Final   09/20/2017 8.12 (H)  0.66 - 1.25 mg/dL Final   09/19/2017 5.53 (H) 0.66 - 1.25 mg/dL Final   08/05/2017 5.55 (H) 0.66 - 1.25 mg/dL Final   04/18/2017 6.06 (H) 0.66 - 1.25 mg/dL Final   04/16/2017 5.30 (H) 0.66 - 1.25 mg/dL Final       Attestation:  I have reviewed today's vital signs, notes, medications, labs and imaging.  Seen on dialysis.      Eric Bradford MD

## 2017-10-12 NOTE — PLAN OF CARE
Problem: Patient Care Overview  Goal: Plan of Care/Patient Progress Review  OT/CR: Having dialysis this AM. Cancel eval for the morning.

## 2017-10-12 NOTE — DISCHARGE SUMMARY
[facility]    Discharge Summary  Hospitalist    Date of Admission:  10/10/2017  Date of Discharge:  10/12/2017  Discharging Provider: Manuelito Pacheco DO    Discharge Diagnoses   1. Chest pain, non-cardiac.  May be GERD   2. ESRD, dialysis on T/Th/Sat  3. HIV, POA  4. DM with Neuropathy    History of Present Illness from H&P  Donald Raza is a 67 year old male who presented with extensive history of CAD/multiple PCIs,  end-stage renal disease on hemodialysis, HIV, hypertension, dyslipidemia with significant hypertriglyceridemia, diabetes, history of life threatening bleeding, who presented with chest pain. Patient stated that his previous episode of CP was 4-5 months ago. He had dialysis the day of admission and following dialysis, he went home to lie down. Around 4-5 pm he experienced severe 7/10 left sided CP (like a screw ) radiating to his back. He took a nitro and CP abated, however recurred 30 minutes later. He took another nitro and called 911.  He had associated lightheadedness, but no diaphoresis, palpitations, or syncope. No weight changes. No PND, orthopnea, leg swelling. He does report some SOB, but not severe.    Hospital Course   Donald Raza was admitted on 10/10/2017.  The following problems were addressed during his hospitalization:    Patient was initially admitted for chest pain concerning for unstable angina as it came on at rest and resolved with nitro.  Initially the patient was started on a nitro drip in the ED with resolution of his pain but not started on a heparin drip due to concerns of bleeding given his history.  Cardiology was consulted and they felt the benefits of Heparin drip outweighed the risks and this was started with plans for angiogram.  The patient's angiogram showed patent stents and no new lesions so it was felt his pain was non-cardiac.  They recommended the patient continue his medical management as previously and follow-up as planned.      While here nephrology was consulted for dialysis as well.  Patient underwent dialysis on the day of discharge without difficulty.     Manuelito Pacheco, DO    Significant Results and Procedures   See below    Pending Results   No pending results  Unresulted Labs Ordered in the Past 30 Days of this Admission     No orders found from 8/11/2017 to 10/11/2017.          Code Status   Full Code       Primary Care Physician   Aida Thomas    Physical Exam   Temp: 98.2  F (36.8  C) Temp src: Oral BP: 145/69 Pulse: 65 Heart Rate: 65 Resp: 14 SpO2: 95 % O2 Device: None (Room air)    Vitals:    10/10/17 1843 10/11/17 0600 10/12/17 0400   Weight: 90 kg (198 lb 6.6 oz) 86.6 kg (190 lb 14.7 oz) 89.8 kg (197 lb 15.6 oz)     Vital Signs with Ranges  Temp:  [97.5  F (36.4  C)-98.2  F (36.8  C)] 98.2  F (36.8  C)  Pulse:  [57-71] 65  Heart Rate:  [59-76] 65  Resp:  [9-21] 14  BP: (118-165)/(63-82) 145/69  SpO2:  [94 %-98 %] 95 %  I/O last 3 completed shifts:  In: 169.33 [P.O.:120; I.V.:49.33]  Out: 2000 [Other:2000]    Constitutional: Resting comfortably.  No apparent distress  Eyes: EOMI  Respiratory: Clear to auscultation to all lung fields.  No respiratory distress  Cardiovascular: RRR.  No murmurs  Musculoskeletal: No peripheral edema       Discharge Disposition   Discharged to home  Condition at discharge: Stable    Consultations This Hospital Stay   NEPHROLOGY IP CONSULT  CARDIAC REHAB IP CONSULT  CARDIOLOGY IP CONSULT  PHARMACY TO DOSE HEPARIN  PHARMACY IP CONSULT  PHARMACY IP CONSULT  SMOKING CESSATION PROGRAM IP CONSULT  SMOKING CESSATION PROGRAM IP CONSULT    Time Spent on this Encounter   I, Manuelito Pacheco, personally saw the patient today and spent less than or equal to 30 minutes discharging this patient.    Discharge Orders     Follow-Up with Cardiac Advanced Practice Provider     Reason for your hospital stay   Chest pain     Follow-up and recommended labs and tests    Follow up with primary care provider,  Aida Thomas, within 2-3 weeks, for hospital follow- up. No follow up labs or test are needed.    Also needs follow-up with cardiology     Activity   Your activity upon discharge: activity as tolerated     Full Code     Diet   Follow this diet upon discharge: Orders Placed This Encounter     Combination Diet Dialysis Diet       Discharge Medications   Current Discharge Medication List      CONTINUE these medications which have NOT CHANGED    Details   isosorbide mononitrate (IMDUR) 30 MG 24 hr tablet Take 3 tablets (90 mg) by mouth every evening  Qty: 270 tablet, Refills: 1    Associated Diagnoses: Coronary artery disease involving native heart, angina presence unspecified, unspecified vessel or lesion type; Hypertension secondary to other renal disorders      B COMPLEX-C-FOLIC ACID PO Take 1 tablet by mouth At Bedtime       insulin glargine (LANTUS) 100 UNIT/ML injection Inject 36 Units Subcutaneous every morning    Associated Diagnoses: Type 2 diabetes mellitus with chronic kidney disease on chronic dialysis, unspecified long term insulin use status (H)      !! amLODIPine (NORVASC) 2.5 MG tablet Take 1 tablet (2.5 mg) by mouth daily  Qty: 30 tablet, Refills: 0    Associated Diagnoses: Hypertension secondary to other renal disorders      !! amLODIPine (NORVASC) 5 MG tablet Take 5 mg by mouth daily as needed (only uses when blood pressure greater than 150-160)      HYDRALAZINE HCL PO Take 25 mg by mouth 2 times daily as needed for high blood pressure      albuterol (PROAIR HFA/PROVENTIL HFA/VENTOLIN HFA) 108 (90 BASE) MCG/ACT Inhaler Inhale 2 puffs into the lungs every 6 hours as needed for shortness of breath / dyspnea or wheezing  Qty: 1 Inhaler, Refills: 1    Associated Diagnoses: Cough      omeprazole (PRILOSEC) 40 MG capsule Take 40 mg by mouth daily 1 hour before dinner      insulin lispro (HUMALOG KWIKPEN) 100 UNIT/ML injection Inject 5 Units Subcutaneous 3 times daily (before meals)    Associated  "Diagnoses: Type 2 diabetes mellitus with chronic kidney disease on chronic dialysis, unspecified long term insulin use status (H)      mycophenolate (CELLCEPT - GENERIC EQUIVALENT) 500 MG tablet Take 500 mg by mouth 2 times daily On an empty stomach      losartan (COZAAR) 100 MG tablet Take 1 tablet (100 mg) by mouth At Bedtime  Qty: 30 tablet, Refills: 0    Associated Diagnoses: Hypertension secondary to other renal disorders      !! gabapentin (NEURONTIN) 300 MG capsule Take 600 mg by mouth every evening      imiquimod (ALDARA) 5 % cream Apply topically as needed      DOXERCALCIFEROL IV Inject 6 mcg into the vein three times a week (with dialysis)      CLONAZEPAM PO Take 0.5 mg by mouth nightly as needed for anxiety (restless legs)      CLOPIDOGREL BISULFATE PO Take 75 mg by mouth every evening       abacavir (ZIAGEN) 300 MG tablet Take 600 mg by mouth every evening       chlorhexidine (CHLORHEXIDINE) 0.12 % solution Rinse and gargle 15 ml by mouth twice a day as directed.      terbinafine (LAMISIL AT) 1 % cream Apply topically 2 times daily as needed       atorvastatin (LIPITOR) 40 MG tablet Take 40 mg by mouth At Bedtime      epoetin brittni (EPOGEN,PROCRIT) 20986 UNIT/ML injection Inject 11,000 Units Subcutaneous three times a week WITH DIALYSIS      iron sucrose (VENOFER) 20 MG/ML injection Inject 50 mg into the vein once a week WIth dialysis      MAGNESIUM OXIDE PO Take 400 mg by mouth At Bedtime      dolutegravir (TIVICAY) 50 MG tablet Take 50 mg by mouth At Bedtime      nitroglycerin (NITROSTAT) 0.4 MG SL tablet One tablet under the tongue every 5 minutes if needed for chest pain. May repeat every 5 minutes for a maximum of 3 doses in 15 minutes\"  Qty: 25 tablet, Refills: 3    Associated Diagnoses: Coronary artery disease due to lipid rich plaque; Status post coronary angioplasty      !! gabapentin (NEURONTIN) 300 MG capsule Take 300 mg by mouth every morning       dapsone 100 MG tablet Take 100 mg by mouth At " Bedtime       Darunavir Ethanolate (PREZISTA PO) Take 800 mg by mouth At Bedtime.      ritonavir (NORVIR) 100 MG capsule Take 1 capsule by mouth At Bedtime       Nutritional Supplements (NEPRO PO) 1-3 times daily      order for DME Equipment being ordered: Other: 4WW  Treatment Diagnosis: Decreased activity tolerance  Qty: 1 each, Refills: 0    Associated Diagnoses: SOB (shortness of breath); Generalized muscle weakness       !! - Potential duplicate medications found. Please discuss with provider.        Allergies   Allergies   Allergen Reactions     Calcium Acetate Other (See Comments)     Other reaction(s): Other, see comments  Pain in chest and back  Pain in chest area (sensitive skin)      Diagnostic X-Ray Materials Other (See Comments)     PN: renal failure     Lisinopril      Sulfa Drugs      Data   Most Recent 3 CBC's:  Recent Labs   Lab Test  10/11/17   1303  10/10/17   1914  09/20/17   1211   WBC  5.2  6.2  4.3   HGB  8.3*  8.9*  9.4*   MCV  88  88  88   PLT  127*  143*  155      Most Recent 3 BMP's:  Recent Labs   Lab Test  10/10/17   1914  09/20/17   1211  09/19/17   1309   NA  133  132*  130*   POTASSIUM  4.1  4.5  3.8   CHLORIDE  93*  93*  93*   CO2  31  28  29   BUN  29  50*  35*   CR  6.14*  8.12*  5.53*   ANIONGAP  9  11  8   PRABHA  9.2  9.3  9.1   GLC  235*  129*  151*     Most Recent 2 LFT's:  Recent Labs   Lab Test  09/20/17   1211  08/09/17   0818   04/15/17   0819   AST  23   --    --   20   ALT  23  22   < >  22   ALKPHOS  99   --    --   77   BILITOTAL  0.6   --    --   0.4    < > = values in this interval not displayed.     Most Recent INR's and Anticoagulation Dosing History:  Anticoagulation Dose History     Recent Dosing and Labs Latest Ref Rng & Units 5/19/2016 8/15/2016 1/8/2017 4/15/2017 9/19/2017 9/20/2017 10/10/2017    INR 0.86 - 1.14 1.08 0.99 1.41(H) 1.09 0.94 1.01 0.95        Most Recent 3 Troponin's:  Recent Labs   Lab Test  10/11/17   0630  10/11/17   0220  10/10/17   4246    04/15/17   0820   05/19/16   0951   TROPI  0.016  0.017  0.016   < >   --    < >   --    TROPONIN   --    --    --    --   0.02   --   0.02    < > = values in this interval not displayed.     Most Recent Cholesterol Panel:  Recent Labs   Lab Test  08/09/17   0818   CHOL  125   LDL  30   HDL  33*   TRIG  311*     Most Recent 6 Bacteria Isolates From Any Culture (See EPIC Reports for Culture Details):  Recent Labs   Lab Test  03/30/17   0800  03/29/17   0131  03/28/17   2044  03/28/17 2015 03/28/17 1953 01/07/17   1150   CULT  Light growth Normal subhash  Canceled, Test credited >10 Squamous epithelial cells/low power field indicates   oral contamination. Please recollect. Notification of test cancellation was   given to Jennifer Pacheco RN [id number] 03.29.17 CF  *  10,000 to 50,000 colonies/mL mixed urogenital subhash  Susceptibility testing not routinely done    No growth  No growth  No growth  Incorrectly ordered by PCU/Clinic Canceled, Test credited ordered sputum culture   instead.       Most Recent TSH, T4 and A1c Labs:  Recent Labs   Lab Test  10/10/17   2225  09/20/17   1211   TSH  2.19   --    A1C   --   4.9     Results for orders placed or performed during the hospital encounter of 10/10/17   XR Chest 2 Views    Narrative    CHEST TWO VIEWS October 10, 2017 11:29 PM     HISTORY: ACS/chest pain.    COMPARISON: 8/5/2017.      Impression    IMPRESSION: Cardiomegaly is present. Pulmonary vasculature is  minimally prominent but there is no pulmonary edema or any pleural  effusions. No infiltrates.    DAVID HERNANDEZ MD

## 2017-10-12 NOTE — PROGRESS NOTES
Potassium   Date Value Ref Range Status   10/10/2017 4.1 3.4 - 5.3 mmol/L Final     Lab Results   Component Value Date    HGB 8.3 10/11/2017   Results for GREGORIO BRUSH (MRN [id number]) as of 10/12/2017 07:43   Ref. Range 1/18/2017 08:30   Hep B Surface Agn Latest Ref Range: NR  Nonreactive   Hepatitis B Surface Antibody Latest Ref Range: <8.00 m[IU]/mL 20.44 (H)     Weight: 89.8 kg (197 lb 15.6 oz)  POST WT: -2 kg  EDW: 88.9 kg  DIALYSIS PROCEDURE NOTE    Patient dialyzed for 3.5 hrs. on a K3 bath with a net fluid removal of  2 L.    A BFR of 450 ml/min was obtained via a left upper-arm AV fistula using 15 gauge needles.      The patient was seen by Dr. Bradford during the treatment.    Total heparin received during the treatment: 1300 units.   Sites held x 20 min then  pressure drsgs applied.    Meds  Given: epogen, hectorol   Complications: none.    Procedure and ESRD teaching done and questions answered. See flowsheet in EPIC for further details and post assessment.  Machine water alarm in place and functioning.  Machine log book checked & verified negative hepatitis status.   Consent for dialysis signed prior to start of treatment.  Pt returned via wheelchair.  Vascular Access: Aseptic prep done for both on/off.  Report received from: DANIEL Box RN  Report given to: P. Dennie, RN  Chlorine/Chloramine water system checked every 4 hours.  Outpatient Dialysis at [facility]    Catina Blank RN  [facility]

## 2017-10-13 ENCOUNTER — CARE COORDINATION (OUTPATIENT)
Dept: CARDIOLOGY | Facility: CLINIC | Age: 67
End: 2017-10-13

## 2017-10-13 NOTE — PROGRESS NOTES
Called patient and left  to discuss any post hospital d/c questions he may have, review medication changes, and confirm f/u appts. RN advised patient in  that he has an apt scheduled on 11/10/17 with RENETTA Juany Hinoojsa. Patient advised in  to call clinic with any cardiac related questions or concerns prior to his apt on 11/10/17.

## 2017-10-13 NOTE — PLAN OF CARE
Problem: Patient Care Overview  Goal: Plan of Care/Patient Progress Review  Outcome: Adequate for Discharge Date Met:  10/12/17  Pt A/O x4, discharged to home accompanied by family with private transportation. Pt verbalized understanding of d/c instructions including diet, activity, meds and follow up appts. Right groin site c/d, no hematoma, no bruit. Pt appreciative of cares.

## 2017-10-19 ENCOUNTER — CARE COORDINATION (OUTPATIENT)
Dept: CARE COORDINATION | Facility: CLINIC | Age: 67
End: 2017-10-19

## 2017-10-19 LAB — INTERPRETATION ECG - MUSE: NORMAL

## 2017-10-19 NOTE — PROGRESS NOTES
Clinic Care Coordination Contact  [facility] Coord[facility] Outreach    [facility] from[facility]    Program Type: Diabetes    Program Length: 30 days post discharge    Clinical Data:  Outreach Type: Variance follow up call (NO PCP VISIT SCHEDULED - APPEARS TO BE NON [facility] PATIENT)     Survey Alerts, Oct 18, 2017  P3 - No MD appt. Scheduled - NON [facility] pcp listed as Deanne Thomas MD     Action: Actions: Left Voicemail for patient to return call to [facility] so she can clarify who pcp is and address variance        Plan: RN CC will continue to monitor patients responses. Will plan to outreach as needed and with any new variances or concerns.     Gail Moyer Care Coordinator RN  [facility]  [phone]  October 19, 2017

## 2017-10-20 NOTE — PROGRESS NOTES
Clinic Care Coordination Contact  [facility]/Voicemail    Referral Source: Tele-Monitoring  Clinical Data: Care Coordinator Outreach - sandip f/u   Unclear if patient is being seen by  clinic for primary   Outreach attempted x 2. Phone is not accepting calls at this time - unable to leave message     Gail Moyer Care Coordinator RN  [facility]  [phone]  October 20, 2017

## 2017-10-23 ENCOUNTER — CARE COORDINATION (OUTPATIENT)
Dept: CARE COORDINATION | Facility: CLINIC | Age: 67
End: 2017-10-23

## 2017-10-23 NOTE — PROGRESS NOTES
Closing this encounter patient has flagged again today on sandip Moyer Care Coordinator RN  [facility]  [phone]  October 23, 2017

## 2017-10-23 NOTE — PROGRESS NOTES
Clinic Care Coordination Contact  Reginald Meneses Care Coordination Outreach    Patient was enrolled in [place] [facility] upon Discharged from: Hospital    Program Type: Diabetes    Program Length: 30 days post discharge    Clinical Data:  Outreach Type: Variance follow up call (treating hypoglycemia)      Survey Alerts, Oct 21, 2017  P1 - Hypoglycemia Treatment - Lowest blood sugar reading was 130 - patient did not require any glucose tablets or food/drink to bring blood sugar up   Survey Alerts, Oct 20, 2017  P2 - No Home Health f/u does not have home health care in the home    P2 - Change in Activity/Stress/Meal Plan - trying to eat healthier     Action: Actions:        Plan: RN CC will  Transition to NON [facility] provider Jorge Moyer Care Coordinator RN  [facility]  [phone]  October 23, 2017

## 2017-10-27 ENCOUNTER — CARE COORDINATION (OUTPATIENT)
Dept: CARE COORDINATION | Facility: CLINIC | Age: 67
End: 2017-10-27

## 2017-10-27 NOTE — PROGRESS NOTES
Clinic Care Coordination Contact  [facility] Care Coordination Outreach    Patient was enrolled in [facility] upon Discharged from: Hospital    Program Type: Diabetes    Program Length: 30 days post discharge    Clinical Data:  Outreach Type: Variance follow up call        Action: LVM requesting callback.           Plan: Will route to clinic RN CCC  For follow up to outreach as needed and with any new variances or concerns.     MELVI RoblesN, RN, PHN  Clinic Care Coordinator  [facility]  [phone]

## 2017-10-30 ENCOUNTER — CARE COORDINATION (OUTPATIENT)
Dept: CARE COORDINATION | Facility: CLINIC | Age: 67
End: 2017-10-30

## 2017-10-30 NOTE — PROGRESS NOTES
Clinic Care Coordination Contact  [facility] Care Coordination Outreach    Patient was enrolled in [facility] upon Discharged from: Hospital    Program Type: Diabetes    Program Length: 30 days post discharge    Clinical Data:  Outreach Type: Variance follow up call     (Insert screen shot from [facility] if applicable)      Action: Actions: Left Voicemail              Plan: RN CC will continue to monitor patients responses. Will plan to outreach as needed and with any new variances or concerns.

## 2017-11-01 ENCOUNTER — CARE COORDINATION (OUTPATIENT)
Dept: CARE COORDINATION | Facility: CLINIC | Age: 67
End: 2017-11-01

## 2017-11-01 NOTE — PROGRESS NOTES
Clinic Care Coordination Contact  [place] [facility] Care Coordination Outreach    Patient was enrolled in [facility] upon Discharged from: Hospital    Program Type: Diabetes    Program Length: 30 days post discharge    Clinical Data:  Outreach Type: Variance follow up call     (Insert screen shot from [facility] if applicable)      Action: Actions: Left Voicemail                      Plan: RN CC will continue to monitor patients responses. Will plan to outreach as needed and with any new variances or concerns.

## 2017-11-06 ENCOUNTER — CARE COORDINATION (OUTPATIENT)
Dept: CARE COORDINATION | Facility: CLINIC | Age: 67
End: 2017-11-06

## 2017-11-06 NOTE — PROGRESS NOTES
Clinic Care Coordination Contact  [place] [facility] Care Coordination Outreach    Patient was enrolled in [facility] upon Discharged from: Hospital    Program Type: Diabetes    Program Length: 30 days post discharge    Clinical Data:  Outreach Type: Variance follow up call     (Insert screen shot from [facility] if applicable)      Action: Actions: Left Voicemail                         Plan: RN CC will continue to monitor patients responses. Will plan to outreach as needed and with any new variances or concerns.

## 2017-11-10 ENCOUNTER — TRANSFERRED RECORDS (OUTPATIENT)
Dept: HEALTH INFORMATION MANAGEMENT | Facility: CLINIC | Age: 67
End: 2017-11-10

## 2017-11-13 ENCOUNTER — CARE COORDINATION (OUTPATIENT)
Dept: CARE COORDINATION | Facility: CLINIC | Age: 67
End: 2017-11-13

## 2017-11-13 NOTE — PROGRESS NOTES
Clinic Care Coordination Contact  [place] [facility] Care Coordination Outreach    Patient was enrolled in [facility] upon Discharged from: Hospital    Program Type: Diabetes    Program Length: 30 days post discharge    Clinical Data:  Outreach Type: Variance follow up call     (Insert screen shot from [facility] if applicable)      Action: Actions: Left Voicemail                         Plan: RN CC will continue to monitor patients responses. Will plan to outreach as needed and with any new variances or concerns.

## 2017-11-15 ENCOUNTER — CARE COORDINATION (OUTPATIENT)
Dept: CARE COORDINATION | Facility: CLINIC | Age: 67
End: 2017-11-15

## 2017-11-15 NOTE — PROGRESS NOTES
Clinic Care Coordination Contact  [place] [facility] Care Coordination Outreach    Patient was enrolled in [facility] upon Discharged from: Hospital    Program Type: Diabetes    Program Length: 30 days post discharge    Clinical Data:  Outreach Type: Variance follow up call     (Insert screen shot from [facility] if applicable)      Action: Actions: Left Voicemail                      Plan: RN CC will continue to monitor patients responses. Will plan to outreach as needed and with any new variances or concerns.

## 2017-11-21 ENCOUNTER — APPOINTMENT (OUTPATIENT)
Dept: GENERAL RADIOLOGY | Facility: CLINIC | Age: 67
DRG: 177 | End: 2017-11-21
Attending: EMERGENCY MEDICINE
Payer: MEDICARE

## 2017-11-21 ENCOUNTER — HOSPITAL ENCOUNTER (INPATIENT)
Facility: CLINIC | Age: 67
LOS: 7 days | Discharge: HOME | DRG: 177 | End: 2017-11-28
Attending: EMERGENCY MEDICINE | Admitting: INTERNAL MEDICINE
Payer: MEDICARE

## 2017-11-21 ENCOUNTER — TRANSFERRED RECORDS (OUTPATIENT)
Dept: HEALTH INFORMATION MANAGEMENT | Facility: CLINIC | Age: 67
End: 2017-11-21

## 2017-11-21 ENCOUNTER — APPOINTMENT (OUTPATIENT)
Dept: CT IMAGING | Facility: CLINIC | Age: 67
DRG: 177 | End: 2017-11-21
Attending: EMERGENCY MEDICINE
Payer: MEDICARE

## 2017-11-21 DIAGNOSIS — E11.00 TYPE 2 DIABETES MELLITUS WITH HYPEROSMOLARITY WITHOUT COMA, WITHOUT LONG-TERM CURRENT USE OF INSULIN (H): Primary | Chronic | ICD-10-CM

## 2017-11-21 DIAGNOSIS — A41.9 SEVERE SEPSIS (H): ICD-10-CM

## 2017-11-21 DIAGNOSIS — R65.20 SEVERE SEPSIS (H): ICD-10-CM

## 2017-11-21 DIAGNOSIS — R07.9 ACUTE CHEST PAIN: ICD-10-CM

## 2017-11-21 DIAGNOSIS — N18.6 ESRD (END STAGE RENAL DISEASE) ON DIALYSIS (H): ICD-10-CM

## 2017-11-21 DIAGNOSIS — D64.9 ANEMIA: ICD-10-CM

## 2017-11-21 DIAGNOSIS — Z99.2 ESRD (END STAGE RENAL DISEASE) ON DIALYSIS (H): ICD-10-CM

## 2017-11-21 DIAGNOSIS — J18.9 COMMUNITY ACQUIRED PNEUMONIA, UNSPECIFIED LATERALITY: ICD-10-CM

## 2017-11-21 DIAGNOSIS — B20 HUMAN IMMUNODEFICIENCY VIRUS (HIV) DISEASE (H): ICD-10-CM

## 2017-11-21 LAB
ANION GAP SERPL CALCULATED.3IONS-SCNC: 8 MMOL/L (ref 3–14)
APTT PPP: 36 SEC (ref 22–37)
BASOPHILS # BLD AUTO: 0 10E9/L (ref 0–0.2)
BASOPHILS NFR BLD AUTO: 0.5 %
BLD GP AB SCN TITR SERPL: NORMAL {TITER}
BUN SERPL-MCNC: 25 MG/DL (ref 7–30)
CALCIUM SERPL-MCNC: 8.8 MG/DL (ref 8.5–10.1)
CHLORIDE SERPL-SCNC: 95 MMOL/L (ref 94–109)
CO2 SERPL-SCNC: 29 MMOL/L (ref 20–32)
CREAT SERPL-MCNC: 4.77 MG/DL (ref 0.66–1.25)
D DIMER PPP FEU-MCNC: 1.1 UG/ML FEU (ref 0–0.5)
DIFFERENTIAL METHOD BLD: ABNORMAL
EOSINOPHIL # BLD AUTO: 0.1 10E9/L (ref 0–0.7)
EOSINOPHIL NFR BLD AUTO: 1.3 %
ERYTHROCYTE [DISTWIDTH] IN BLOOD BY AUTOMATED COUNT: 16.3 % (ref 10–15)
GFR SERPL CREATININE-BSD FRML MDRD: 12 ML/MIN/1.7M2
GLUCOSE BLDC GLUCOMTR-MCNC: 185 MG/DL (ref 70–99)
GLUCOSE SERPL-MCNC: 273 MG/DL (ref 70–99)
HCT VFR BLD AUTO: 21.5 % (ref 40–53)
HGB BLD-MCNC: 6.8 G/DL (ref 13.3–17.7)
IMM GRANULOCYTES # BLD: 0 10E9/L (ref 0–0.4)
IMM GRANULOCYTES NFR BLD: 0.5 %
INR PPP: 0.97 (ref 0.86–1.14)
LACTATE SERPL-SCNC: 1.3 MMOL/L (ref 0.4–2)
LACTATE SERPL-SCNC: 2.7 MMOL/L (ref 0.4–2)
LYMPHOCYTES # BLD AUTO: 0.9 10E9/L (ref 0.8–5.3)
LYMPHOCYTES NFR BLD AUTO: 15 %
MCH RBC QN AUTO: 28.9 PG (ref 26.5–33)
MCHC RBC AUTO-ENTMCNC: 31.6 G/DL (ref 31.5–36.5)
MCV RBC AUTO: 92 FL (ref 78–100)
MONOCYTES # BLD AUTO: 0.6 10E9/L (ref 0–1.3)
MONOCYTES NFR BLD AUTO: 9.1 %
NEUTROPHILS # BLD AUTO: 4.5 10E9/L (ref 1.6–8.3)
NEUTROPHILS NFR BLD AUTO: 73.6 %
NRBC # BLD AUTO: 0 10*3/UL
NRBC BLD AUTO-RTO: 0 /100
PLATELET # BLD AUTO: 144 10E9/L (ref 150–450)
POTASSIUM SERPL-SCNC: 4.1 MMOL/L (ref 3.4–5.3)
PROCALCITONIN SERPL-MCNC: 2.53 NG/ML
RBC # BLD AUTO: 2.35 10E12/L (ref 4.4–5.9)
SODIUM SERPL-SCNC: 132 MMOL/L (ref 133–144)
TROPONIN I SERPL-MCNC: 0.02 UG/L (ref 0–0.04)
WBC # BLD AUTO: 6.1 10E9/L (ref 4–11)

## 2017-11-21 PROCEDURE — 96375 TX/PRO/DX INJ NEW DRUG ADDON: CPT

## 2017-11-21 PROCEDURE — 87040 BLOOD CULTURE FOR BACTERIA: CPT | Performed by: EMERGENCY MEDICINE

## 2017-11-21 PROCEDURE — 71260 CT THORAX DX C+: CPT

## 2017-11-21 PROCEDURE — 86886 COOMBS TEST INDIRECT TITER: CPT | Performed by: EMERGENCY MEDICINE

## 2017-11-21 PROCEDURE — 00000146 ZZHCL STATISTIC GLUCOSE BY METER IP

## 2017-11-21 PROCEDURE — 93005 ELECTROCARDIOGRAM TRACING: CPT

## 2017-11-21 PROCEDURE — 25000128 H RX IP 250 OP 636: Performed by: EMERGENCY MEDICINE

## 2017-11-21 PROCEDURE — 25000125 ZZHC RX 250: Performed by: INTERNAL MEDICINE

## 2017-11-21 PROCEDURE — 96367 TX/PROPH/DG ADDL SEQ IV INF: CPT

## 2017-11-21 PROCEDURE — 12000007 ZZH R&B INTERMEDIATE

## 2017-11-21 PROCEDURE — 84484 ASSAY OF TROPONIN QUANT: CPT | Performed by: EMERGENCY MEDICINE

## 2017-11-21 PROCEDURE — 25000132 ZZH RX MED GY IP 250 OP 250 PS 637: Mod: GY | Performed by: INTERNAL MEDICINE

## 2017-11-21 PROCEDURE — 84145 PROCALCITONIN (PCT): CPT | Performed by: EMERGENCY MEDICINE

## 2017-11-21 PROCEDURE — A9270 NON-COVERED ITEM OR SERVICE: HCPCS | Mod: GY | Performed by: INTERNAL MEDICINE

## 2017-11-21 PROCEDURE — 85610 PROTHROMBIN TIME: CPT | Performed by: EMERGENCY MEDICINE

## 2017-11-21 PROCEDURE — 86923 COMPATIBILITY TEST ELECTRIC: CPT | Performed by: EMERGENCY MEDICINE

## 2017-11-21 PROCEDURE — 36415 COLL VENOUS BLD VENIPUNCTURE: CPT | Performed by: INTERNAL MEDICINE

## 2017-11-21 PROCEDURE — 80048 BASIC METABOLIC PNL TOTAL CA: CPT | Performed by: EMERGENCY MEDICINE

## 2017-11-21 PROCEDURE — 85025 COMPLETE CBC W/AUTO DIFF WBC: CPT | Performed by: EMERGENCY MEDICINE

## 2017-11-21 PROCEDURE — 85379 FIBRIN DEGRADATION QUANT: CPT | Performed by: EMERGENCY MEDICINE

## 2017-11-21 PROCEDURE — 86901 BLOOD TYPING SEROLOGIC RH(D): CPT | Performed by: EMERGENCY MEDICINE

## 2017-11-21 PROCEDURE — 96365 THER/PROPH/DIAG IV INF INIT: CPT | Mod: 59

## 2017-11-21 PROCEDURE — 86850 RBC ANTIBODY SCREEN: CPT | Performed by: EMERGENCY MEDICINE

## 2017-11-21 PROCEDURE — 36415 COLL VENOUS BLD VENIPUNCTURE: CPT | Performed by: EMERGENCY MEDICINE

## 2017-11-21 PROCEDURE — 71020 XR CHEST 2 VW: CPT

## 2017-11-21 PROCEDURE — 99285 EMERGENCY DEPT VISIT HI MDM: CPT | Mod: 25

## 2017-11-21 PROCEDURE — 85730 THROMBOPLASTIN TIME PARTIAL: CPT | Performed by: EMERGENCY MEDICINE

## 2017-11-21 PROCEDURE — 83605 ASSAY OF LACTIC ACID: CPT | Performed by: EMERGENCY MEDICINE

## 2017-11-21 PROCEDURE — 86900 BLOOD TYPING SEROLOGIC ABO: CPT | Performed by: EMERGENCY MEDICINE

## 2017-11-21 PROCEDURE — 84484 ASSAY OF TROPONIN QUANT: CPT | Performed by: INTERNAL MEDICINE

## 2017-11-21 RX ORDER — MORPHINE SULFATE 4 MG/ML
4 INJECTION, SOLUTION INTRAMUSCULAR; INTRAVENOUS ONCE
Status: COMPLETED | OUTPATIENT
Start: 2017-11-21 | End: 2017-11-21

## 2017-11-21 RX ORDER — CALCIUM CARBONATE 500 MG/1
3 TABLET, CHEWABLE ORAL 4 TIMES DAILY
Status: ON HOLD | COMMUNITY
End: 2018-07-12

## 2017-11-21 RX ORDER — GABAPENTIN 300 MG/1
600 CAPSULE ORAL EVERY EVENING
Status: DISCONTINUED | OUTPATIENT
Start: 2017-11-21 | End: 2017-11-28 | Stop reason: HOSPADM

## 2017-11-21 RX ORDER — ATORVASTATIN CALCIUM 40 MG/1
40 TABLET, FILM COATED ORAL AT BEDTIME
Status: DISCONTINUED | OUTPATIENT
Start: 2017-11-21 | End: 2017-11-28 | Stop reason: HOSPADM

## 2017-11-21 RX ORDER — IOPAMIDOL 755 MG/ML
500 INJECTION, SOLUTION INTRAVASCULAR ONCE
Status: COMPLETED | OUTPATIENT
Start: 2017-11-21 | End: 2017-11-21

## 2017-11-21 RX ORDER — HYDRALAZINE HYDROCHLORIDE 25 MG/1
25 TABLET, FILM COATED ORAL 2 TIMES DAILY PRN
Status: DISCONTINUED | OUTPATIENT
Start: 2017-11-21 | End: 2017-11-28 | Stop reason: HOSPADM

## 2017-11-21 RX ORDER — CLONAZEPAM 0.5 MG/1
0.5 TABLET ORAL
Status: DISCONTINUED | OUTPATIENT
Start: 2017-11-21 | End: 2017-11-28 | Stop reason: HOSPADM

## 2017-11-21 RX ORDER — NITROGLYCERIN 0.4 MG/1
0.4 TABLET SUBLINGUAL EVERY 5 MIN PRN
Status: DISCONTINUED | OUTPATIENT
Start: 2017-11-21 | End: 2017-11-28 | Stop reason: HOSPADM

## 2017-11-21 RX ORDER — NALOXONE HYDROCHLORIDE 0.4 MG/ML
.1-.4 INJECTION, SOLUTION INTRAMUSCULAR; INTRAVENOUS; SUBCUTANEOUS
Status: DISCONTINUED | OUTPATIENT
Start: 2017-11-21 | End: 2017-11-28 | Stop reason: HOSPADM

## 2017-11-21 RX ORDER — LIDOCAINE 40 MG/G
CREAM TOPICAL
Status: DISCONTINUED | OUTPATIENT
Start: 2017-11-21 | End: 2017-11-28 | Stop reason: HOSPADM

## 2017-11-21 RX ORDER — CEFTRIAXONE SODIUM 1 G/50ML
1 INJECTION, SOLUTION INTRAVENOUS EVERY 24 HOURS
Status: DISCONTINUED | OUTPATIENT
Start: 2017-11-22 | End: 2017-11-23

## 2017-11-21 RX ORDER — MORPHINE SULFATE 2 MG/ML
2 INJECTION, SOLUTION INTRAMUSCULAR; INTRAVENOUS
Status: DISCONTINUED | OUTPATIENT
Start: 2017-11-21 | End: 2017-11-28 | Stop reason: HOSPADM

## 2017-11-21 RX ORDER — RITONAVIR 100 MG/1
100 TABLET ORAL AT BEDTIME
Status: DISCONTINUED | OUTPATIENT
Start: 2017-11-21 | End: 2017-11-28 | Stop reason: HOSPADM

## 2017-11-21 RX ORDER — CEFTRIAXONE 1 G/1
1 INJECTION, POWDER, FOR SOLUTION INTRAMUSCULAR; INTRAVENOUS ONCE
Status: COMPLETED | OUTPATIENT
Start: 2017-11-21 | End: 2017-11-21

## 2017-11-21 RX ORDER — GABAPENTIN 300 MG/1
300 CAPSULE ORAL EVERY MORNING
Status: DISCONTINUED | OUTPATIENT
Start: 2017-11-22 | End: 2017-11-28 | Stop reason: HOSPADM

## 2017-11-21 RX ORDER — LOSARTAN POTASSIUM 25 MG/1
25 TABLET ORAL EVERY EVENING
Status: DISCONTINUED | OUTPATIENT
Start: 2017-11-21 | End: 2017-11-28 | Stop reason: HOSPADM

## 2017-11-21 RX ORDER — METOPROLOL TARTRATE 1 MG/ML
5 INJECTION, SOLUTION INTRAVENOUS ONCE
Status: COMPLETED | OUTPATIENT
Start: 2017-11-21 | End: 2017-11-21

## 2017-11-21 RX ORDER — CLOPIDOGREL BISULFATE 75 MG/1
75 TABLET ORAL EVERY EVENING
Status: DISCONTINUED | OUTPATIENT
Start: 2017-11-21 | End: 2017-11-28 | Stop reason: HOSPADM

## 2017-11-21 RX ORDER — ALBUTEROL SULFATE 90 UG/1
2 AEROSOL, METERED RESPIRATORY (INHALATION) EVERY 6 HOURS PRN
Status: DISCONTINUED | OUTPATIENT
Start: 2017-11-21 | End: 2017-11-28 | Stop reason: HOSPADM

## 2017-11-21 RX ORDER — IOPAMIDOL 755 MG/ML
500 INJECTION, SOLUTION INTRAVASCULAR ONCE
Status: DISCONTINUED | OUTPATIENT
Start: 2017-11-21 | End: 2017-11-21 | Stop reason: CLARIF

## 2017-11-21 RX ORDER — AMLODIPINE BESYLATE 5 MG/1
5 TABLET ORAL DAILY
Status: DISCONTINUED | OUTPATIENT
Start: 2017-11-22 | End: 2017-11-28 | Stop reason: HOSPADM

## 2017-11-21 RX ORDER — CALCIUM CARBONATE 500 MG/1
1500 TABLET, CHEWABLE ORAL 3 TIMES DAILY
Status: DISCONTINUED | OUTPATIENT
Start: 2017-11-21 | End: 2017-11-28 | Stop reason: HOSPADM

## 2017-11-21 RX ORDER — AZITHROMYCIN 250 MG/1
250 TABLET, FILM COATED ORAL DAILY
Status: COMPLETED | OUTPATIENT
Start: 2017-11-22 | End: 2017-11-26

## 2017-11-21 RX ORDER — ABACAVIR 300 MG/1
600 TABLET ORAL EVERY EVENING
Status: DISCONTINUED | OUTPATIENT
Start: 2017-11-21 | End: 2017-11-28 | Stop reason: HOSPADM

## 2017-11-21 RX ORDER — ONDANSETRON 4 MG/1
4 TABLET, ORALLY DISINTEGRATING ORAL EVERY 6 HOURS PRN
Status: DISCONTINUED | OUTPATIENT
Start: 2017-11-21 | End: 2017-11-28 | Stop reason: HOSPADM

## 2017-11-21 RX ORDER — MAGNESIUM OXIDE 400 MG/1
400 TABLET ORAL AT BEDTIME
Status: DISCONTINUED | OUTPATIENT
Start: 2017-11-21 | End: 2017-11-28 | Stop reason: HOSPADM

## 2017-11-21 RX ORDER — ACETAMINOPHEN 325 MG/1
975 TABLET ORAL EVERY 6 HOURS PRN
Status: DISCONTINUED | OUTPATIENT
Start: 2017-11-21 | End: 2017-11-28 | Stop reason: HOSPADM

## 2017-11-21 RX ORDER — ONDANSETRON 2 MG/ML
4 INJECTION INTRAMUSCULAR; INTRAVENOUS EVERY 6 HOURS PRN
Status: DISCONTINUED | OUTPATIENT
Start: 2017-11-21 | End: 2017-11-28 | Stop reason: HOSPADM

## 2017-11-21 RX ADMIN — ABACAVIR 600 MG: 300 TABLET, FILM COATED ORAL at 22:22

## 2017-11-21 RX ADMIN — RITONAVIR 100 MG: 100 TABLET, FILM COATED ORAL at 22:22

## 2017-11-21 RX ADMIN — CLOPIDOGREL 75 MG: 75 TABLET, FILM COATED ORAL at 22:24

## 2017-11-21 RX ADMIN — ONDANSETRON 4 MG: 4 TABLET, ORALLY DISINTEGRATING ORAL at 21:07

## 2017-11-21 RX ADMIN — GABAPENTIN 600 MG: 300 CAPSULE ORAL at 22:23

## 2017-11-21 RX ADMIN — SODIUM CHLORIDE 500 ML: 9 INJECTION, SOLUTION INTRAVENOUS at 18:41

## 2017-11-21 RX ADMIN — CALCIUM CARBONATE (ANTACID) CHEW TAB 500 MG 1500 MG: 500 CHEW TAB at 22:20

## 2017-11-21 RX ADMIN — METOPROLOL TARTRATE 5 MG: 5 INJECTION INTRAVENOUS at 19:39

## 2017-11-21 RX ADMIN — MORPHINE SULFATE 4 MG: 4 INJECTION, SOLUTION INTRAMUSCULAR; INTRAVENOUS at 18:44

## 2017-11-21 RX ADMIN — SODIUM CHLORIDE 85 ML: 9 INJECTION, SOLUTION INTRAVENOUS at 17:47

## 2017-11-21 RX ADMIN — CEFTRIAXONE SODIUM 1 G: 1 INJECTION, POWDER, FOR SOLUTION INTRAMUSCULAR; INTRAVENOUS at 18:52

## 2017-11-21 RX ADMIN — LOSARTAN POTASSIUM 25 MG: 25 TABLET, FILM COATED ORAL at 22:20

## 2017-11-21 RX ADMIN — OMEPRAZOLE 40 MG: 20 CAPSULE, DELAYED RELEASE ORAL at 22:21

## 2017-11-21 RX ADMIN — ATORVASTATIN CALCIUM 40 MG: 40 TABLET, FILM COATED ORAL at 22:21

## 2017-11-21 RX ADMIN — MAGNESIUM OXIDE TAB 400 MG (241.3 MG ELEMENTAL MG) 400 MG: 400 (241.3 MG) TAB at 22:24

## 2017-11-21 RX ADMIN — IOPAMIDOL 71 ML: 755 INJECTION, SOLUTION INTRAVENOUS at 17:47

## 2017-11-21 RX ADMIN — AZITHROMYCIN MONOHYDRATE 500 MG: 500 INJECTION, POWDER, LYOPHILIZED, FOR SOLUTION INTRAVENOUS at 19:26

## 2017-11-21 RX ADMIN — DOLUTEGRAVIR SODIUM 50 MG: 50 TABLET, FILM COATED ORAL at 22:21

## 2017-11-21 RX ADMIN — ISOSORBIDE MONONITRATE 90 MG: 60 TABLET, EXTENDED RELEASE ORAL at 22:24

## 2017-11-21 RX ADMIN — DARUNAVIR 800 MG: 800 TABLET, FILM COATED ORAL at 22:22

## 2017-11-21 ASSESSMENT — ACTIVITIES OF DAILY LIVING (ADL)
RETIRED_COMMUNICATION: 0-->UNDERSTANDS/COMMUNICATES WITHOUT DIFFICULTY
DRESS: 2-->ASSISTIVE PERSON
FALL_HISTORY_WITHIN_LAST_SIX_MONTHS: NO
TOILETING: 1-->ASSISTIVE EQUIPMENT
RETIRED_EATING: 0-->INDEPENDENT
BATHING: 1-->ASSISTIVE EQUIPMENT
SWALLOWING: 0-->SWALLOWS FOODS/LIQUIDS WITHOUT DIFFICULTY
AMBULATION: 1-->ASSISTIVE EQUIPMENT
COGNITION: 0 - NO COGNITION ISSUES REPORTED
TRANSFERRING: 1-->ASSISTIVE EQUIPMENT

## 2017-11-21 ASSESSMENT — ENCOUNTER SYMPTOMS
FATIGUE: 1
SHORTNESS OF BREATH: 1

## 2017-11-21 NOTE — ED NOTES
Arrives via EMS from [facility], pt has had intermittent chest pain this am he went to dialysis this am, received dialysis went home and chest pain became more significant. Pt pain 5/10, VSS on arrival. Pt reports he has been feeling more weak and ill since Saturday, warm to touch but afebrile. A/ox 3, ABC's intact.

## 2017-11-21 NOTE — ED NOTES
Bed: ED32  Expected date: 11/21/17  Expected time: 1:24 PM  Means of arrival: Ambulance  Comments:  MOLINA 68 yo M CP

## 2017-11-21 NOTE — ED PROVIDER NOTES
History     Chief Complaint:  Chest pain    HPI   Donald Raza is a 67 year old male with a history of AIDS, ACS, CAD, end stage renal disease, and cardiac stent placement who presents with chest pain. The patient reports that he began experiencing some central chest pain at 0400 this morning. This chest pain did not radiate anywhere. There has been some related shortness of breath as well. He notes that this pain does not feel like when he has needed stent placement in the past. The patient additionally reports feeling increasingly fatigued for the past three days after his last round of dialysis. After dialysis today, pain worsened which is why pt presented for treatment. Chart review notes that pt had 2 heart caths within the last year.     Allergies:   Calcium acetate   Diagnostic x ray materials  Lisinopril  Sulfa drugs    Medications:    Imdur  Lantus  Amlodipine  Hydralazine  Albuterol  Omeprazole  Mycophenolate  Losartan  Gabapentin  Doxercalciferol  Clonazepam  Clopidogrel bisulfate  Ziagen  Prezista  Norvir    Past Medical History:    ACS  Allergic rhinitis  Bilateral pneumonia  Huang disease  Bradycardia  CAD  Chest pain  CKD  DIlated aortic root  ESRD  Hemodialysis-associated hypotension  Hypertension  Hypotension  Impotence  Increased prostate specific antigen velocity  Mixed hyperlipidemia  Near syncope  NSTEMI  Pulmonary hypertension  TIA  Unstable angina  Type 2 diabetes    Past Surgical History:    Angiogram  Appendectomy  Cholecystectomy  Colostomy  Heart cath  Stent, coronary    Family History:    CABG  Kidney disease  Hypertension  Dilated aorta    Social History:  The patient was accompanied to the ED by his friend.  Smoking Status: Former  Smokeless Tobacco: No  Alcohol Use: No   Marital Status:   [2]    Review of Systems   Constitutional: Positive for fatigue.   Respiratory: Positive for shortness of breath.    Cardiovascular: Positive for chest pain.   All other systems reviewed  and are negative.    Physical Exam   Vitals:  Patient Vitals for the past 24 hrs:   BP Temp Temp src Pulse Heart Rate Resp SpO2 Weight   11/21/17 2226 149/72 - - - 73 - - -   11/21/17 2019 124/58 98.8  F (37.1  C) Oral - 68 20 97 % 89.4 kg (197 lb)   11/21/17 1952 - - - - 67 21 94 % -   11/21/17 1950 144/73 - - - 68 - 94 % -   11/21/17 1944 151/76 - - - 71 18 94 % -   11/21/17 1930 151/79 - - - 76 - 91 % -   11/21/17 1915 156/80 - - - 77 - 93 % -   11/21/17 1900 151/83 - - - 74 - 95 % -   11/21/17 1845 160/81 - - - 78 - 96 % -   11/21/17 1830 162/79 - - - 81 16 94 % -   11/21/17 1815 162/79 - - - 79 19 95 % -   11/21/17 1806 - 99.1  F (37.3  C) Oral - - - - -   11/21/17 1803 154/77 - - - 78 19 95 % -   11/21/17 1739 - - - - 76 22 94 % -   11/21/17 1713 159/79 - - - - - 95 % -   11/21/17 1645 171/83 - - - 82 - 95 % -   11/21/17 1630 168/79 - - - 80 - 96 % -   11/21/17 1615 169/84 - - - 82 14 91 % -   11/21/17 1600 168/87 - - - 80 14 90 % -   11/21/17 1545 167/85 - - - 80 - 94 % -   11/21/17 1530 168/90 - - - 81 - 95 % -   11/21/17 1515 173/83 - - - 78 - 92 % -   11/21/17 1500 155/86 - - - - - 91 % -   11/21/17 1445 171/84 - - - - - 94 % -   11/21/17 1415 161/78 - - - - - 91 % -   11/21/17 1409 - - - - 80 16 96 % -   11/21/17 1408 161/76 - - - 79 - - -   11/21/17 1400 - - - - 76 15 96 % -   11/21/17 1333 166/81 - - - - - - -   11/21/17 1327 - 98.4  F (36.9  C) Oral 81 - 16 95 % 88.2 kg (194 lb 7.1 oz)        Physical Exam  Nursing note and vitals reviewed.  Constitutional: Cooperative.   HENT:   Mouth/Throat: Moist mucous membranes.   Eyes: EOMI, nonicteric sclera  Cardiovascular: Normal rate, regular rhythm, Holosystolic murmur, no rubs or gallops. Fistula in LUE with palpable thrill.  Pulmonary/Chest: Effort normal and breath sounds normal. No respiratory distress. No wheezes. No rales.   Abdominal: Soft. Nontender, nondistended, no guarding or rigidity. BS present.   Musculoskeletal: Normal range of motion.    Neurological: Alert. Moves all extremities spontaneously.   Skin: Skin is warm and dry. No rash noted.   Psychiatric: Normal mood and affect.     Emergency Department Course     ECG:  ECG taken at 11984, ECG read at 1357  Sinus rhythm with occasional premature ventriclar complexes. PVC new compared to 10/11/17. Otherwise normal ECG.   Rate 79 bpm. MO interval 170. QRS duration 98. QT/QTc 400/458. P-R-T axes 30, 8, 48.     Imaging:  Radiology findings were communicated with the patient who voiced understanding of the findings.  XR chest 2 views:  IMPRESSION: There are few small faint nodular densities in the right  upper lobe not seen on the previous exam from 10/10/2017. This could  represent some subtle infiltrate. Short-term follow-up recommended.  Reading per radiology.     Laboratory:  Laboratory findings were communicated with the patient who voiced understanding of the findings.  CBC: RBC: 2.35(L), HGB: 6.8(LL), HCT: 21.5(L), RDW: 16.3(H), PLT: 144(L), o/w WNL (WBC 6.1)  BMP: NA: 132(L), Glucose: 273(H), Creatinine: 4.77(H), GFR: 12(L), o/w WNL   Troponin (Collected 1405): 2.7(H)  Lactic acid (1405): 2.7(H)  D Dimer (Collected 1405): 1.1(H)   ABO/Rh type and screen: O Positive, antibody screen: Negative  INR: 0.97  PTT: 36    Interventions:   iopamidol (ISOVUE-370) solution 500 mL (71 mLs Intravenous Given 11/21/17 1747)   0.9% sodium chloride BOLUS (0 mLs Intravenous Stopped 11/21/17 1748)   cefTRIAXone (ROCEPHIN) 1 g vial to attach to  mL bag for ADULTS or NS 50 mL bag for PEDS (0 g Intravenous Stopped 11/21/17 1907)   morphine (PF) injection 4 mg (4 mg Intravenous Given 11/21/17 1844)   0.9% sodium chloride BOLUS (0 mLs Intravenous ED Infusing on Admission/transfer 11/21/17 1950)   azithromycin (ZITHROMAX) 500 mg in D5W 250 mL intermittent infusion (0 mg Intravenous ED Infusing on Admission/transfer 11/21/17 1950)         Emergency Department Course:  Nursing notes and vitals reviewed.  I performed  an exam of the patient as documented above.   IV was inserted and blood was drawn for laboratory testing, results above.  The patient was sent for a XR while in the emergency department, results above.      I personally reviewed the laboratory results with the Patient and answered all related questions prior to discharge.    Impression & Plan      Medical Decision Making:  Pt presents with CC chest pain. Pt with significant comorbidities and cardaic hx. The DDx of the patients chest pain includes ACS, angina, pericarditis, PE, pneumonia, pleuritis, dissection, aneurysmal disease, GERD, esophageal spasm, costochronditis/chest wall pain, etc as the most likely etiologies. CXR with equivocal findings. D-dimer elevated, therefore sent pt for CT chest PE protocol which shows likely multifocal pneumonia, though mild on CT. No leukocytosis. Lactate elevated and pt anemic. Lactate elevation may represent severe sepsis, but may also be due to dehydration and hypoperfusion from anemia/dialysis today. Only 500ml bolus given due to pt being on dialysis. Unit of blood given for acute on chronic anemia, but pt declines stating he only wants it with dialysis due to poor results in past. Antibx started for community acquired pneumonia. Blood cultures pending. Discussed admission with pt and all of his questions are answered. Dr. Cali accepts. Pt in agreement with plan.       Diagnosis:  Severe sepsis.   Community acquired pneumonia  ESRD  Chest pain  Anemia    Disposition:   Admit    CMS Diagnoses:   The patient has signs of Severe Sepsis as evidenced by:    1. 2 SIRS criteria, AND  2. Suspected infection, AND   3. Organ dysfunction: Lactic Acid >2    Time severe sepsis diagnosis confirmed = 1755 as this was the time when CT result returned confirming source of infection.         3 Hour Severe Sepsis Bundle Completion:  1. Initial Lactic Acid Result:   Recent Labs   Lab Test  11/21/17   1921  11/21/17   1405  04/15/17   0824    LACT  1.3  2.7*  1.8     2. Blood Cultures before Antibiotics: Yes  3. Broad Spectrum Antibiotics Administered: Yes     6 Hour Severe Sepsis Bundle Completion:  1. Repeat Lactic Acid Level: 1.3  2. MAP>65 after initial IVF bolus, will continue to monitor fluid status and vital signs            Scribe Disclosure:  I, Joshua Alcantara, am serving as a scribe at 1:58 PM on 11/21/2017 to document services personally performed by Jose Mack MD, based on my observations and the provider's statements to me.   [facility] EMERGENCY DEPARTMENT       Jose Mack MD  11/22/17 0022

## 2017-11-21 NOTE — LETTER
Transition Communication Hand-off for Care Transitions to Next Level of Care Provider    Name: Donald Raza  MRN #: [id number]  Primary Care Provider: Aida Thomas  Primary Care MD Name: Dr Thomas  Primary Clinic: [facility] [address] [place] [id number]  Primary Care Clinic Name: [facility]  Reason for Hospitalization:  Anemia [D64.9]  Admit Date/Time: 11/21/2017  1:26 PM  Discharge Date: 11/28/17  Payor Source: Payor: MEDICARE / Plan: MEDICARE / Product Type: Medicare /     Readmission Assessment Measure (MUSHTAQ) Risk Score/category: ELEVATED           Reason for Communication Hand-off Referral: Admission diagnoses: PN  Fragility  Multiple providers/specialties    Discharge Plan:       Concern for non-adherence with plan of care:   no  Discharge Needs Assessment:  Needs       Most Recent Value    Transportation Available car, family or friend will provide          Already enrolled in Tele-monitoring program and name of program:  na  Follow-up specialty is recommended: Yes    Follow-up plan:  Future Appointments  Date Time Provider Department Center   12/1/2017 8:00 AM Juany Hinojosa, APRN CNP [facility]       Any outstanding tests or procedures:             Follow-up and recommended labs and tests        Follow up with primary care provider, Aida Thomas, within 7 days for hospital follow- up.  The following labs/tests are recommended: blood glucose and CBC.       Insulin dose was reduced will need to adjust based on your diet upon discharge   Cellcept was held while in hospital secondary to infection. Can be resumed as out pt if OK per your physician based on clinical status and resolution of infection     Follow up with nephrology for dialysis                Key Recommendations:  Pt was admitted with PNA. This is his 6th admit this year and he has an elevated MUSHTAQ score. Pt lives at home with his wife, brother and daughter. His daughter is his PCA. He is  currently being treated with oxygen and antibiotics. He does not anticipate any dc needs. He will cont his MWF dialysis plan. Pt and wife will make their own follow up appts.    Karen Botello    AVS/Discharge Summary is the source of truth; this is a helpful guide for improved communication of patient story

## 2017-11-21 NOTE — IP AVS SNAPSHOT
MRN:[id number]                      After Visit Summary   11/21/2017    Donald Raza    MRN: [id number]           Thank you!     Thank you for choosing [facility] for your care. Our goal is always to provide you with excellent care. Hearing back from our patients is one way we can continue to improve our services. Please take a few minutes to complete the written survey that you may receive in the mail after you visit. If you would like to speak to someone directly about your visit please contact Patient Relations at [phone]. Thank you!          Patient Information     Date Of Birth          1950        About your hospital stay     You were admitted on:  November 21, 2017 You last received care in the:  [place] [address] [facility]    You were discharged on:  November 28, 2017        Reason for your hospital stay       Please refer to discharge summary. Briefly admitted for shortness of breath treated for aspiration pneumonis                  Who to Call     For medical emergencies, please call 911.  For non-urgent questions about your medical care, please call your primary care provider or clinic, [phone]          Attending Provider     Provider Specialty    Jose Mack MD Emergency Medicine    [facility]       Primary Care Provider Office Phone # Fax #    Aida Thomas [phone]      After Care Instructions     Activity       Your activity upon discharge: activity as tolerated            Diet       Follow this diet upon discharge: Orders Placed This Encounter      Dialysis Diet            Discharge Instructions       Please call or come if you have any new or worsening symptoms            Monitor and record       blood glucose 4 times a day, before meals and at bedtime                  Follow-up Appointments     Follow-up and recommended labs and tests        Follow up with primary care provider,  "Aida Thomas, within 7 days for hospital follow- up.  The following labs/tests are recommended: blood glucose and CBC.      Insulin dose was reduced will need to adjust based on your diet upon discharge  Cellcept was held while in hospital secondary to infection. Can be resumed as out pt if OK per your physician based on clinical status and resolution of infection    Follow up with nephrology for dialysis                  Your next 10 appointments already scheduled     Dec 01, 2017  8:00 AM CST   Return Visit with DEDRICK Singh CNP   [facility] ([facility])    [address]  [facility] [address] [phone]              Pending Results     Date and Time Order Name Status Description    11/25/2017 0345 Sputum Culture Aerobic Bacterial Preliminary             Statement of Approval     Ordered          11/28/17 7167  I have reviewed and agree with all the recommendations and orders detailed in this document.  EFFECTIVE NOW     Approved and electronically signed by:  Sherice Bernardo MD             Admission Information     Date & Time Provider Department Dept. Phone    11/21/2017 Adam Cali MD [place] [address] [facility] [phone]      Your Vitals Were     Blood Pressure Pulse Temperature Respirations Weight Pulse Oximetry    155/79 84 97.9  F (36.6  C) (Oral) 18 89.8 kg (197 lb 15.6 oz) 97%    BMI (Body Mass Index)                   27.61 kg/m2           [employer] Information     [employer] lets you send messages to your doctor, view your test results, renew your prescriptions, schedule appointments and more. To sign up, go to [email or web address] . Click on \"Log in\" on the left side of the screen, which will take you to the Welcome page. Then click on \"Sign up Now\" on the right side of the page.     You will be asked to enter the access code listed below, as well as some personal information. Please follow the " directions to create your username and password.     Your access code is: [email or web address]  Expires: 2018  1:13 PM     Your access code will  in 90 days. If you need help or a new code, please call your [facility] clinic or [phone].        Care EveryWhere ID     This is your Care EveryWhere ID. This could be used by other organizations to access your [facility] medical records  [id number]        Equal Access to Services     JOON KERN : Hadii aad ku hadasho Soomaali, waaxda luqadaha, qaybta kaalmada adeegyada, waxay gabrielain hayamann mitesh ottshelleysae culver . So [facility] [phone].    ATENCIÓN: Si habla español, tiene a ayala disposición servicios gratuitos de asistencia lingüística. [facility] [phone].    We comply with applicable federal civil rights laws and [place] laws. We do not discriminate on the basis of race, color, national origin, age, disability, sex, sexual orientation, or gender identity.               Review of your medicines      START taking        Dose / Directions    amoxicillin-clavulanate 500-125 MG per tablet   Commonly known as:  AUGMENTIN   Used for:  Community acquired pneumonia, unspecified laterality        Dose:  1 tablet   Take 1 tablet by mouth daily for 5 days   Quantity:  5 tablet   Refills:  0         CONTINUE these medicines which may have CHANGED, or have new prescriptions. If we are uncertain of the size of tablets/capsules you have at home, strength may be listed as something that might have changed.        Dose / Directions    insulin glargine 100 UNIT/ML injection   Commonly known as:  LANTUS   This may have changed:  how much to take   Used for:  Type 2 diabetes mellitus with hyperosmolarity without coma, without long-term current use of insulin (H)        Dose:  30 Units   Inject 30 Units Subcutaneous every morning Take at 10 am   Refills:  0         CONTINUE these medicines which have NOT CHANGED        Dose / Directions    abacavir 300 MG tablet   Commonly known as:   ZIAGEN        Dose:  600 mg   Take 600 mg by mouth every evening   Refills:  0       albuterol 108 (90 BASE) MCG/ACT Inhaler   Commonly known as:  PROAIR HFA/PROVENTIL HFA/VENTOLIN HFA   Used for:  Cough        Dose:  2 puff   Inhale 2 puffs into the lungs every 6 hours as needed for shortness of breath / dyspnea or wheezing   Quantity:  1 Inhaler   Refills:  1       AMLODIPINE BESYLATE PO        Dose:  5 mg   Take 5 mg by mouth daily   Refills:  0       atorvastatin 40 MG tablet   Commonly known as:  LIPITOR        Dose:  40 mg   Take 40 mg by mouth At Bedtime   Refills:  0       B COMPLEX-C-FOLIC ACID PO        Dose:  1 tablet   Take 1 tablet by mouth At Bedtime   Refills:  0       calcium carbonate 500 MG chewable tablet   Commonly known as:  TUMS        Dose:  3 chew tab   Take 3 chew tab by mouth 3 times daily   Refills:  0       chlorhexidine 0.12 % solution   Commonly known as:  PERIDEX        Rinse and gargle 15 ml by mouth twice a day as directed.   Refills:  0       CLONAZEPAM PO        Dose:  0.5 mg   Take 0.5 mg by mouth nightly as needed for anxiety (restless legs)   Refills:  0       CLOPIDOGREL BISULFATE PO        Dose:  75 mg   Take 75 mg by mouth every evening   Refills:  0       dapsone 100 MG tablet        Dose:  100 mg   Take 100 mg by mouth At Bedtime   Refills:  0       dolutegravir 50 MG tablet   Commonly known as:  TIVICAY        Dose:  50 mg   Take 50 mg by mouth At Bedtime   Refills:  0       DOXERCALCIFEROL IV        Dose:  6 mcg   Inject 6 mcg into the vein three times a week (with dialysis)   Refills:  0       epoetin brittni 13645 UNIT/ML injection   Commonly known as:  EPOGEN/PROCRIT        Dose:  18433 Units   Inject 11,000 Units Subcutaneous three times a week WITH DIALYSIS   Refills:  0       * gabapentin 300 MG capsule   Commonly known as:  NEURONTIN        Dose:  300 mg   Take 300 mg by mouth every morning   Refills:  0       * gabapentin 300 MG capsule   Commonly known as:   "NEURONTIN        Dose:  600 mg   Take 600 mg by mouth every evening   Refills:  0       HUMALOG PEN SC        Take 15 units TID and an additional 2 units if BG is over 150   Refills:  0       HYDRALAZINE HCL PO        Dose:  25 mg   Take 25 mg by mouth 2 times daily as needed for high blood pressure   Refills:  0       imiquimod 5 % cream   Commonly known as:  ALDARA        Apply topically as needed   Refills:  0       iron sucrose 20 MG/ML injection   Commonly known as:  VENOFER        Dose:  50 mg   Inject 50 mg into the vein once a week WIth dialysis   Refills:  0       isosorbide mononitrate 30 MG 24 hr tablet   Commonly known as:  IMDUR   Used for:  Coronary artery disease involving native heart, angina presence unspecified, unspecified vessel or lesion type, Hypertension secondary to other renal disorders        Dose:  90 mg   Take 3 tablets (90 mg) by mouth every evening   Quantity:  270 tablet   Refills:  1       lamISIL AT 1 % cream   Generic drug:  terbinafine        Apply topically 2 times daily as needed   Refills:  0       LOSARTAN POTASSIUM PO        Dose:  25 mg   Take 25 mg by mouth every evening   Refills:  0       MAGNESIUM OXIDE PO        Dose:  400 mg   Take 400 mg by mouth At Bedtime   Refills:  0       mycophenolate 500 MG tablet   Commonly known as:  GENERIC EQUIVALENT   Used for:  ESRD (end stage renal disease) on dialysis (H)        Dose:  500 mg   Take 1 tablet (500 mg) by mouth 2 times daily On an empty stomach   Refills:  0       NEPRO PO        1-3 times daily   Refills:  0       nitroGLYcerin 0.4 MG sublingual tablet   Commonly known as:  NITROSTAT   Used for:  Coronary artery disease due to lipid rich plaque, Status post coronary angioplasty        One tablet under the tongue every 5 minutes if needed for chest pain. May repeat every 5 minutes for a maximum of 3 doses in 15 minutes\"   Quantity:  25 tablet   Refills:  3       order for DME   Used for:  SOB (shortness of breath), " Generalized muscle weakness        Equipment being ordered: Other: 4WW Treatment Diagnosis: Decreased activity tolerance   Quantity:  1 each   Refills:  0       PREZISTA PO        Dose:  800 mg   Take 800 mg by mouth At Bedtime.   Refills:  0       priLOSEC 40 MG capsule   Generic drug:  omeprazole        Dose:  40 mg   Take 40 mg by mouth daily 1 hour before dinner   Refills:  0       ritonavir 100 MG capsule   Commonly known as:  NORVIR        Dose:  1 capsule   Take 1 capsule by mouth At Bedtime   Refills:  0       * Notice:  This list has 2 medication(s) that are the same as other medications prescribed for you. Read the directions carefully, and ask your doctor or other care provider to review them with you.         Where to get your medicines      These medications were sent to [facility] [address] [address]  [address] [address] [address]     Phone:  [phone]     amoxicillin-clavulanate 500-125 MG per tablet         Some of these will need a paper prescription and others can be bought over the counter. Ask your nurse if you have questions.     You don't need a prescription for these medications     insulin glargine 100 UNIT/ML injection    mycophenolate 500 MG tablet               ANTIBIOTIC INSTRUCTION     You've Been Prescribed an Antibiotic - Now What?  Your healthcare team thinks that you or your loved one might have an infection. Some infections can be treated with antibiotics, which are powerful, life-saving drugs. Like all medications, antibiotics have side effects and should only be used when necessary. There are some important things you should know about your antibiotic treatment.      Your healthcare team may run tests before you start taking an antibiotic.    Your team may take samples (e.g., from your blood, urine or other areas) to run tests to look for bacteria. These test can be important to determine if you need an antibiotic at all  and, if you do, which antibiotic will work best.      Within a few days, your healthcare team might change or even stop your antibiotic.    Your team may start you on an antibiotic while they are working to find out what is making you sick.    Your team might change your antibiotic because test results show that a different antibiotic would be better to treat your infection.    In some cases, once your team has more information, they learn that you do not need an antibiotic at all. They may find out that you don't have an infection, or that the antibiotic you're taking won't work against your infection. For example, an infection caused by a virus can't be treated with antibiotics. Staying on an antibiotic when you don't need it is more likely to be harmful than helpful.      You may experience side effects from your antibiotic.    Like all medications, antibiotics have side effects. Some of these can be serious.    Let you healthcare team know if you have any known allergies when you are admitted to the hospital.    One significant side effect of nearly all antibiotics is the risk of severe and sometimes deadly diarrhea caused by Clostridium difficile (C. Difficile). This occurs when a person takes antibiotics because some good germs are destroyed. Antibiotic use allows C. diificile to take over, putting patients at high risk for this serious infection.    As a patient or caregiver, it is important to understand your or your loved one's antibiotic treatment. It is especially important for caregivers to speak up when patients can't speak for themselves. Here are some important questions to ask your healthcare team.    What infection is this antibiotic treating and how do you know I have that infection?    What side effects might occur from this antibiotic?    How long will I need to take this antibiotic?    Is it safe to take this antibiotic with other medications or supplements (e.g., vitamins) that I am taking?      Are there any special directions I need to know about taking this antibiotic? For example, should I take it with food?    How will I be monitored to know whether my infection is responding to the antibiotic?    What tests may help to make sure the right antibiotic is prescribed for me?      Information provided by:  www.cdc.gov/getsmart  U.S. Department of Health and Human Services  Centers for disease Control and Prevention  National Center for Emerging and Zoonotic Infectious Diseases  Division of Healthcare Quality Promotion         Protect others around you: Learn how to safely use, store and throw away your medicines at www.disposemymeds.org.             Medication List: This is a list of all your medications and when to take them. Check marks below indicate your daily home schedule. Keep this list as a reference.      Medications           Morning Afternoon Evening Bedtime As Needed    abacavir 300 MG tablet   Commonly known as:  ZIAGEN   Take 600 mg by mouth every evening   Last time this was given:  600 mg on 11/27/2017  7:35 PM                    8:00pm               albuterol 108 (90 BASE) MCG/ACT Inhaler   Commonly known as:  PROAIR HFA/PROVENTIL HFA/VENTOLIN HFA   Inhale 2 puffs into the lungs every 6 hours as needed for shortness of breath / dyspnea or wheezing                                   AMLODIPINE BESYLATE PO   Take 5 mg by mouth daily   Last time this was given:  5 mg on 11/28/2017 12:12 PM            8:00am                       amoxicillin-clavulanate 500-125 MG per tablet   Commonly known as:  AUGMENTIN   Take 1 tablet by mouth daily for 5 days            8:00am                       atorvastatin 40 MG tablet   Commonly known as:  LIPITOR   Take 40 mg by mouth At Bedtime   Last time this was given:  40 mg on 11/27/2017  9:20 PM                        9:00pm           B COMPLEX-C-FOLIC ACID PO   Take 1 tablet by mouth At Bedtime                        9:00pm           calcium carbonate  500 MG chewable tablet   Commonly known as:  TUMS   Take 3 chew tab by mouth 3 times daily   Last time this was given:  1,500 mg on 11/28/2017 12:13 PM            8:00am       Noon       5:00pm               chlorhexidine 0.12 % solution   Commonly known as:  PERIDEX   Rinse and gargle 15 ml by mouth twice a day as directed.            8:00am           8:00pm               CLONAZEPAM PO   Take 0.5 mg by mouth nightly as needed for anxiety (restless legs)   Last time this was given:  0.5 mg on 11/26/2017  1:46 AM                                   CLOPIDOGREL BISULFATE PO   Take 75 mg by mouth every evening   Last time this was given:  75 mg on 11/27/2017  7:36 PM                    8:00pm               dapsone 100 MG tablet   Take 100 mg by mouth At Bedtime   Last time this was given:  100 mg on 11/27/2017  9:20 PM                        9:00pm           dolutegravir 50 MG tablet   Commonly known as:  TIVICAY   Take 50 mg by mouth At Bedtime   Last time this was given:  50 mg on 11/27/2017  9:20 PM                        9:00pm           DOXERCALCIFEROL IV   Inject 6 mcg into the vein three times a week (with dialysis)                         As previous with Dialysis       epoetin brittni 34559 UNIT/ML injection   Commonly known as:  EPOGEN/PROCRIT   Inject 11,000 Units Subcutaneous three times a week WITH DIALYSIS   Last time this was given:  11/28/2017  8:41 AM                         As previous with Dialysis       * gabapentin 300 MG capsule   Commonly known as:  NEURONTIN   Take 300 mg by mouth every morning   Last time this was given:  300 mg on 11/28/2017 12:13 PM            8:00am                       * gabapentin 300 MG capsule   Commonly known as:  NEURONTIN   Take 600 mg by mouth every evening   Last time this was given:  300 mg on 11/28/2017 12:13 PM                    8:00pm               HUMALOG PEN SC   Take 15 units TID and an additional 2 units if BG is over 150            8:00am       Noon      "  5:00pm               HYDRALAZINE HCL PO   Take 25 mg by mouth 2 times daily as needed for high blood pressure                                   imiquimod 5 % cream   Commonly known as:  ALDARA   Apply topically as needed                                   insulin glargine 100 UNIT/ML injection   Commonly known as:  LANTUS   Inject 30 Units Subcutaneous every morning Take at 10 am            10:00am                       iron sucrose 20 MG/ML injection   Commonly known as:  VENOFER   Inject 50 mg into the vein once a week WIth dialysis                         As previous with Dialysis       isosorbide mononitrate 30 MG 24 hr tablet   Commonly known as:  IMDUR   Take 3 tablets (90 mg) by mouth every evening   Last time this was given:  90 mg on 11/27/2017  7:36 PM                    8:00pm               lamISIL AT 1 % cream   Apply topically 2 times daily as needed   Generic drug:  terbinafine                                   LOSARTAN POTASSIUM PO   Take 25 mg by mouth every evening   Last time this was given:  25 mg on 11/27/2017  7:36 PM                    8:00pm               MAGNESIUM OXIDE PO   Take 400 mg by mouth At Bedtime   Last time this was given:  400 mg on 11/27/2017  9:20 PM                        9:00pm           mycophenolate 500 MG tablet   Commonly known as:  GENERIC EQUIVALENT   Take 1 tablet (500 mg) by mouth 2 times daily On an empty stomach            6:00am           4:00pm               NEPRO PO   1-3 times daily            8:00am       Noon       5:00pm               nitroGLYcerin 0.4 MG sublingual tablet   Commonly known as:  NITROSTAT   One tablet under the tongue every 5 minutes if needed for chest pain. May repeat every 5 minutes for a maximum of 3 doses in 15 minutes\"   Last time this was given:  0.4 mg on 11/27/2017  9:38 PM                                   order for DME   Equipment being ordered: Other: 4WW Treatment Diagnosis: Decreased activity tolerance                             "    PREZISTA PO   Take 800 mg by mouth At Bedtime.   Last time this was given:  800 mg on 11/27/2017  9:20 PM                        9:00pm           priLOSEC 40 MG capsule   Take 40 mg by mouth daily 1 hour before dinner   Last time this was given:  40 mg on 11/27/2017  5:13 PM   Generic drug:  omeprazole                    5:00pm               ritonavir 100 MG capsule   Commonly known as:  NORVIR   Take 1 capsule by mouth At Bedtime                        9:00pm           * Notice:  This list has 2 medication(s) that are the same as other medications prescribed for you. Read the directions carefully, and ask your doctor or other care provider to review them with you.              More Information        Pneumonia (Adult)  Pneumonia is an infection deep within the lungs. It is in the small air sacs (alveoli). Pneumonia may be caused by a virus or bacteria. Pneumonia caused by bacteria is usually treated with an antibiotic. Severe cases may need to be treated in the hospital. Milder cases can be treated at home. Symptoms usually start to get better during the first 2 days of treatment.    Home care  Follow these guidelines when caring for yourself at home:    Rest at home for the first 2 to 3 days, or until you feel stronger. Don t let yourself get overly tired when you go back to your activities.    Stay away from cigarette smoke - yours or other people s.    You may use acetaminophen or ibuprofen to control fever or pain, unless another medicine was prescribed. If you have chronic liver or kidney disease, talk with your healthcare provider before using these medicines. Also talk with your provider if you ve had a stomach ulcer or gastrointestinal bleeding. Don t give aspirin to anyone younger than 18 years of age who is ill with a fever. It may cause severe liver damage.    Your appetite may be poor, so a light diet is fine.    Drink 6 to 8 glasses of fluids every day to make sure you are getting enough fluids.  Beverages can include water, sport drinks, sodas without caffeine, juices, tea, or soup. Fluids will help loosen secretions in the lung. This will make it easier for you to cough up the phlegm (sputum). If you also have heart or kidney disease, check with your healthcare provider before you drink extra fluids.    Take antibiotic medicine prescribed until it is all gone, even if you are feeling better after a few days.  Follow-up care  Follow up with your healthcare provider in the next 2 to 3 days, or as advised. This is to be sure the medicine is helping you get better.  If you are 65 or older, you should get a pneumococcal vaccine and a yearly flu (influenza) shot. You should also get these vaccines if you have chronic lung disease like asthma, emphysema, or COPD. Recently, a second type of pneumonia vaccine has become available for everyone over 65 years old. This is in addition to the previous vaccine. Ask your provider about this.  When to seek medical advice  Call your healthcare provider right away if any of these occur:    You don t get better within the first 48 hours of treatment    Shortness of breath gets worse    Rapid breathing (more than 25 breaths per minute)    Coughing up blood    Chest pain gets worse with breathing    Fever of 100.4 F (38 C) or higher that doesn t get better with fever medicine    Weakness, dizziness, or fainting that gets worse    Thirst or dry mouth that gets worse    Sinus pain, headache, or a stiff neck    Chest pain not caused by coughing  Date Last Reviewed: 1/1/2017 2000-2017 [employer]. [address] [place] [address]. All rights reserved. This information is not intended as a substitute for professional medical care. Always follow your healthcare professional's instructions.

## 2017-11-22 LAB
ANION GAP SERPL CALCULATED.3IONS-SCNC: 9 MMOL/L (ref 3–14)
BLD PROD TYP BPU: NORMAL
BLD UNIT ID BPU: 0
BLOOD PRODUCT CODE: NORMAL
BPU ID: NORMAL
BUN SERPL-MCNC: 37 MG/DL (ref 7–30)
CALCIUM SERPL-MCNC: 8.8 MG/DL (ref 8.5–10.1)
CHLORIDE SERPL-SCNC: 96 MMOL/L (ref 94–109)
CO2 SERPL-SCNC: 29 MMOL/L (ref 20–32)
CREAT SERPL-MCNC: 6.8 MG/DL (ref 0.66–1.25)
ERYTHROCYTE [DISTWIDTH] IN BLOOD BY AUTOMATED COUNT: 15.8 % (ref 10–15)
GFR SERPL CREATININE-BSD FRML MDRD: 8 ML/MIN/1.7M2
GLUCOSE BLDC GLUCOMTR-MCNC: 141 MG/DL (ref 70–99)
GLUCOSE BLDC GLUCOMTR-MCNC: 189 MG/DL (ref 70–99)
GLUCOSE BLDC GLUCOMTR-MCNC: 221 MG/DL (ref 70–99)
GLUCOSE BLDC GLUCOMTR-MCNC: 239 MG/DL (ref 70–99)
GLUCOSE BLDC GLUCOMTR-MCNC: 259 MG/DL (ref 70–99)
GLUCOSE SERPL-MCNC: 163 MG/DL (ref 70–99)
HCT VFR BLD AUTO: 19.2 % (ref 40–53)
HGB BLD-MCNC: 5.9 G/DL (ref 13.3–17.7)
HGB BLD-MCNC: 7.6 G/DL (ref 13.3–17.7)
INTERPRETATION ECG - MUSE: NORMAL
LACTATE BLD-SCNC: 1.5 MMOL/L (ref 0.7–2)
MCH RBC QN AUTO: 28.8 PG (ref 26.5–33)
MCHC RBC AUTO-ENTMCNC: 30.7 G/DL (ref 31.5–36.5)
MCV RBC AUTO: 94 FL (ref 78–100)
PLATELET # BLD AUTO: 112 10E9/L (ref 150–450)
POTASSIUM SERPL-SCNC: 4.3 MMOL/L (ref 3.4–5.3)
RBC # BLD AUTO: 2.05 10E12/L (ref 4.4–5.9)
SODIUM SERPL-SCNC: 134 MMOL/L (ref 133–144)
TRANSFUSION STATUS PATIENT QL: NORMAL
TRANSFUSION STATUS PATIENT QL: NORMAL
TROPONIN I SERPL-MCNC: 0.02 UG/L (ref 0–0.04)
TROPONIN I SERPL-MCNC: 0.02 UG/L (ref 0–0.04)
TROPONIN I SERPL-MCNC: 0.03 UG/L (ref 0–0.04)
TROPONIN I SERPL-MCNC: <0.015 UG/L (ref 0–0.04)
WBC # BLD AUTO: 4.6 10E9/L (ref 4–11)

## 2017-11-22 PROCEDURE — 93005 ELECTROCARDIOGRAM TRACING: CPT

## 2017-11-22 PROCEDURE — 85027 COMPLETE CBC AUTOMATED: CPT | Performed by: INTERNAL MEDICINE

## 2017-11-22 PROCEDURE — 83605 ASSAY OF LACTIC ACID: CPT | Performed by: HOSPITALIST

## 2017-11-22 PROCEDURE — 93010 ELECTROCARDIOGRAM REPORT: CPT | Performed by: INTERNAL MEDICINE

## 2017-11-22 PROCEDURE — 85018 HEMOGLOBIN: CPT | Performed by: HOSPITALIST

## 2017-11-22 PROCEDURE — 36415 COLL VENOUS BLD VENIPUNCTURE: CPT | Performed by: HOSPITALIST

## 2017-11-22 PROCEDURE — 12000007 ZZH R&B INTERMEDIATE

## 2017-11-22 PROCEDURE — 36415 COLL VENOUS BLD VENIPUNCTURE: CPT | Performed by: INTERNAL MEDICINE

## 2017-11-22 PROCEDURE — 80048 BASIC METABOLIC PNL TOTAL CA: CPT | Performed by: INTERNAL MEDICINE

## 2017-11-22 PROCEDURE — 25000132 ZZH RX MED GY IP 250 OP 250 PS 637: Mod: GY | Performed by: INTERNAL MEDICINE

## 2017-11-22 PROCEDURE — 99233 SBSQ HOSP IP/OBS HIGH 50: CPT | Performed by: INTERNAL MEDICINE

## 2017-11-22 PROCEDURE — 94640 AIRWAY INHALATION TREATMENT: CPT

## 2017-11-22 PROCEDURE — A9270 NON-COVERED ITEM OR SERVICE: HCPCS | Mod: GY | Performed by: INTERNAL MEDICINE

## 2017-11-22 PROCEDURE — 25000131 ZZH RX MED GY IP 250 OP 636 PS 637: Mod: GY | Performed by: INTERNAL MEDICINE

## 2017-11-22 PROCEDURE — 25000128 H RX IP 250 OP 636: Performed by: INTERNAL MEDICINE

## 2017-11-22 PROCEDURE — P9040 RBC LEUKOREDUCED IRRADIATED: HCPCS | Performed by: EMERGENCY MEDICINE

## 2017-11-22 PROCEDURE — 87040 BLOOD CULTURE FOR BACTERIA: CPT | Performed by: HOSPITALIST

## 2017-11-22 PROCEDURE — 84484 ASSAY OF TROPONIN QUANT: CPT | Performed by: HOSPITALIST

## 2017-11-22 PROCEDURE — 00000146 ZZHCL STATISTIC GLUCOSE BY METER IP

## 2017-11-22 PROCEDURE — 84484 ASSAY OF TROPONIN QUANT: CPT | Performed by: INTERNAL MEDICINE

## 2017-11-22 PROCEDURE — 40000275 ZZH STATISTIC RCP TIME EA 10 MIN

## 2017-11-22 PROCEDURE — 85018 HEMOGLOBIN: CPT | Performed by: INTERNAL MEDICINE

## 2017-11-22 PROCEDURE — 25000125 ZZHC RX 250: Performed by: HOSPITALIST

## 2017-11-22 PROCEDURE — 25000128 H RX IP 250 OP 636: Performed by: HOSPITALIST

## 2017-11-22 RX ORDER — HYDROMORPHONE HCL/0.9% NACL/PF 0.2MG/0.2
0.2 SYRINGE (ML) INTRAVENOUS EVERY 6 HOURS PRN
Status: DISCONTINUED | OUTPATIENT
Start: 2017-11-22 | End: 2017-11-28 | Stop reason: HOSPADM

## 2017-11-22 RX ORDER — NICOTINE POLACRILEX 4 MG
15-30 LOZENGE BUCCAL
Status: DISCONTINUED | OUTPATIENT
Start: 2017-11-22 | End: 2017-11-28 | Stop reason: HOSPADM

## 2017-11-22 RX ORDER — DEXTROSE MONOHYDRATE 25 G/50ML
25-50 INJECTION, SOLUTION INTRAVENOUS
Status: DISCONTINUED | OUTPATIENT
Start: 2017-11-22 | End: 2017-11-28 | Stop reason: HOSPADM

## 2017-11-22 RX ORDER — FUROSEMIDE 10 MG/ML
80 INJECTION INTRAMUSCULAR; INTRAVENOUS ONCE
Status: DISCONTINUED | OUTPATIENT
Start: 2017-11-22 | End: 2017-11-22

## 2017-11-22 RX ORDER — FUROSEMIDE 10 MG/ML
80 INJECTION INTRAMUSCULAR; INTRAVENOUS ONCE
Status: COMPLETED | OUTPATIENT
Start: 2017-11-22 | End: 2017-11-22

## 2017-11-22 RX ORDER — IPRATROPIUM BROMIDE AND ALBUTEROL SULFATE 2.5; .5 MG/3ML; MG/3ML
3 SOLUTION RESPIRATORY (INHALATION)
Status: DISCONTINUED | OUTPATIENT
Start: 2017-11-22 | End: 2017-11-28 | Stop reason: HOSPADM

## 2017-11-22 RX ORDER — DAPSONE 100 MG/1
100 TABLET ORAL AT BEDTIME
Status: DISCONTINUED | OUTPATIENT
Start: 2017-11-22 | End: 2017-11-28 | Stop reason: HOSPADM

## 2017-11-22 RX ADMIN — CALCIUM CARBONATE (ANTACID) CHEW TAB 500 MG 1500 MG: 500 CHEW TAB at 08:25

## 2017-11-22 RX ADMIN — IPRATROPIUM BROMIDE AND ALBUTEROL SULFATE 3 ML: .5; 3 SOLUTION RESPIRATORY (INHALATION) at 20:33

## 2017-11-22 RX ADMIN — OMEPRAZOLE 40 MG: 20 CAPSULE, DELAYED RELEASE ORAL at 16:03

## 2017-11-22 RX ADMIN — Medication 0.2 MG: at 18:50

## 2017-11-22 RX ADMIN — INSULIN ASPART 3 UNITS: 100 INJECTION, SOLUTION INTRAVENOUS; SUBCUTANEOUS at 11:50

## 2017-11-22 RX ADMIN — RITONAVIR 100 MG: 100 TABLET, FILM COATED ORAL at 22:22

## 2017-11-22 RX ADMIN — INSULIN GLARGINE 20 UNITS: 100 INJECTION, SOLUTION SUBCUTANEOUS at 13:24

## 2017-11-22 RX ADMIN — DARUNAVIR 800 MG: 800 TABLET, FILM COATED ORAL at 22:23

## 2017-11-22 RX ADMIN — INSULIN ASPART 2 UNITS: 100 INJECTION, SOLUTION INTRAVENOUS; SUBCUTANEOUS at 17:21

## 2017-11-22 RX ADMIN — DOLUTEGRAVIR SODIUM 50 MG: 50 TABLET, FILM COATED ORAL at 22:21

## 2017-11-22 RX ADMIN — GABAPENTIN 600 MG: 300 CAPSULE ORAL at 20:16

## 2017-11-22 RX ADMIN — MAGNESIUM OXIDE TAB 400 MG (241.3 MG ELEMENTAL MG) 400 MG: 400 (241.3 MG) TAB at 22:21

## 2017-11-22 RX ADMIN — CALCIUM CARBONATE (ANTACID) CHEW TAB 500 MG 1500 MG: 500 CHEW TAB at 20:15

## 2017-11-22 RX ADMIN — CEFTRIAXONE SODIUM 1 G: 1 INJECTION, SOLUTION INTRAVENOUS at 17:48

## 2017-11-22 RX ADMIN — ISOSORBIDE MONONITRATE 90 MG: 60 TABLET, EXTENDED RELEASE ORAL at 20:06

## 2017-11-22 RX ADMIN — AZITHROMYCIN 250 MG: 250 TABLET, FILM COATED ORAL at 08:25

## 2017-11-22 RX ADMIN — ACETAMINOPHEN 975 MG: 325 TABLET, FILM COATED ORAL at 20:05

## 2017-11-22 RX ADMIN — AMLODIPINE BESYLATE 5 MG: 5 TABLET ORAL at 08:25

## 2017-11-22 RX ADMIN — FUROSEMIDE 80 MG: 10 INJECTION, SOLUTION INTRAVENOUS at 18:39

## 2017-11-22 RX ADMIN — GABAPENTIN 300 MG: 300 CAPSULE ORAL at 08:25

## 2017-11-22 RX ADMIN — ATORVASTATIN CALCIUM 40 MG: 40 TABLET, FILM COATED ORAL at 22:21

## 2017-11-22 RX ADMIN — DAPSONE 100 MG: 100 TABLET ORAL at 22:21

## 2017-11-22 RX ADMIN — CLOPIDOGREL 75 MG: 75 TABLET, FILM COATED ORAL at 20:06

## 2017-11-22 RX ADMIN — ABACAVIR 600 MG: 300 TABLET, FILM COATED ORAL at 20:16

## 2017-11-22 RX ADMIN — CALCIUM CARBONATE (ANTACID) CHEW TAB 500 MG 1500 MG: 500 CHEW TAB at 13:04

## 2017-11-22 RX ADMIN — LOSARTAN POTASSIUM 25 MG: 25 TABLET, FILM COATED ORAL at 20:22

## 2017-11-22 RX ADMIN — ACETAMINOPHEN 975 MG: 325 TABLET, FILM COATED ORAL at 08:25

## 2017-11-22 ASSESSMENT — ACTIVITIES OF DAILY LIVING (ADL)
ADLS_ACUITY_SCORE: 9
ADLS_ACUITY_SCORE: 15
ADLS_ACUITY_SCORE: 9
ADLS_ACUITY_SCORE: 9

## 2017-11-22 NOTE — ED NOTES
.  [facility]  ED Nurse Handoff Report    Donald Raza is a 67 year old male   ED Chief complaint: Chest Pain  . ED Diagnosis:   Final diagnoses:   Anemia     Allergies:   Allergies   Allergen Reactions     Calcium Acetate Other (See Comments)     Other reaction(s): Other, see comments  Pain in chest and back  Pain in chest area (sensitive skin)      Diagnostic X-Ray Materials Other (See Comments)     PN: renal failure     Lisinopril      Sulfa Drugs        Code Status: Full Code  Activity level - Baseline/Home:  Independent with cane. Activity Level - Current:   Stand with Assist. Lift room needed: No. Bariatric: No   Needed: No   Isolation: No. Infection: Not Applicable.     Vital Signs:   Vitals:    11/21/17 1830 11/21/17 1845 11/21/17 1900 11/21/17 1915   BP: 162/79 160/81 151/83 156/80   Pulse:       Resp: 16      Temp:       TempSrc:       SpO2: 94% 96% 95% 93%   Weight:           Cardiac Rhythm:  ,   Cardiac  Cardiac Rhythm: Normal sinus rhythm  Pain level: 0-10 Pain Scale: 2  Patient confused: No. Patient Falls Risk: Yes.   Elimination Status: Unable to void   Patient Report - Initial Complaint: history of AIDS, ACS, CAD, end stage renal disease, and cardiac stent placement who presents with chest pain. The patient reports that he began experiencing some central chest pain at 0400 this morning. This chest pain did not radiate anywhere. There has been some related shortness of breath as well. He notes that this pain does not feel like when he has needed stent placement in the past. The patient additionally reports feeling increasingly fatigued for the past three days after his last round of dialysis.   Focused Assessment: Cardiac - Cardiac WDL: heart sounds Heart Sounds: S1, S2  Review of Systems (Cardiac) - Cardiac Signs/Symptoms: chest pain  Chest Pain Assessment - Rating (0-10): 5 Precipitating Factors: at rest  Cardiac Monitoring - EKG Monitoring: Yes Cardiac Regularity: Regular  Cardiac Rhythm: NSR  Chest Pain Assessment - Chest Pain Location: midsternal   Respiratory - Respiratory WDL: cough Lung Fields: All Fields Cough Frequency: infrequent Cough Type: fair; dry Throughout All Lung Fields: clear  Tests Performed: EKG, labs, CT chest. Abnormal Results: .  Labs Ordered and Resulted from Time of ED Arrival Up to the Time of Departure from the ED   CBC WITH PLATELETS DIFFERENTIAL - Abnormal; Notable for the following:        Result Value    RBC Count 2.35 (*)     Hemoglobin 6.8 (*)     Hematocrit 21.5 (*)     RDW 16.3 (*)     Platelet Count 144 (*)     All other components within normal limits   BASIC METABOLIC PANEL - Abnormal; Notable for the following:     Sodium 132 (*)     Glucose 273 (*)     Creatinine 4.77 (*)     GFR Estimate 12 (*)     GFR Estimate If Black 15 (*)     All other components within normal limits   LACTIC ACID - Abnormal; Notable for the following:     Lactic Acid 2.7 (*)     All other components within normal limits   D DIMER QUANTITATIVE - Abnormal; Notable for the following:     D Dimer 1.1 (*)     All other components within normal limits   TROPONIN I   INR   PARTIAL THROMBOPLASTIN TIME   PROCALCITONIN   LACTIC ACID   ABO/RH TYPE AND SCREEN   RED BLOOD CELL PREPARE ORDER UNIT   ANTIBODY TITER RED CELL   BLOOD COMPONENT   BLOOD CULTURE   BLOOD CULTURE   .  Chest CT, IV contrast only - PE protocol   Preliminary Result   IMPRESSION:   1. There is no pulmonary embolus, aortic aneurysm or dissection.   2. Right upper lobe pneumonia. Probable minimal right lower lobe   pneumonia.    3. A few borderline and mildly enlarged right hilar and mediastinal   lymph nodes may be reactive.    4. Coronary artery atherosclerotic calcifications and cardiomegaly.      XR Chest 2 Views   Final Result   IMPRESSION: There are few small faint nodular densities in the right   upper lobe not seen on the previous exam from 10/10/2017. This could   represent some subtle infiltrate. Short-term  follow-up recommended.      VESNA LEDESAM MD      .   Treatments provided: see MAR    OBS brochure/video discussed/provided to patient:  N/A  ED Medications:   Medications   azithromycin (ZITHROMAX) 500 mg in D5W 250 mL intermittent infusion (500 mg Intravenous New Bag 11/21/17 1926)   metoprolol (LOPRESSOR) injection 5 mg (not administered)   iopamidol (ISOVUE-370) solution 500 mL (71 mLs Intravenous Given 11/21/17 1747)   0.9% sodium chloride BOLUS (0 mLs Intravenous Stopped 11/21/17 1748)   cefTRIAXone (ROCEPHIN) 1 g vial to attach to  mL bag for ADULTS or NS 50 mL bag for PEDS (0 g Intravenous Stopped 11/21/17 1907)   morphine (PF) injection 4 mg (4 mg Intravenous Given 11/21/17 1844)   0.9% sodium chloride BOLUS (500 mLs Intravenous New Bag 11/21/17 1841)     Drips infusing:  No  For the majority of the shift, the patient's behavior Green. Interventions performed were NA.     Severe Sepsis OR Septic Shock Diagnosis Present: No      ED Nurse Name/Phone Number: Madhuri Hou RN ERB  7:32 PM    RECEIVING UNIT ED HANDOFF REVIEW    Above ED Nurse Handoff Report was reviewed: Yes  Reviewed by: Cristopher Mendiola on November 21, 2017 at 7:39 PM

## 2017-11-22 NOTE — CONSULTS
Care Transition Initial Assessment - RN    Reason For Consult: discharge planning, care coordination/care conference   Met with: Patient.    DATA   Active Problems:    Chest pain       Cognitive Status: alert and oriented.  Primary Care Clinic Name: [facility]  Primary Care MD Name: Dr Thomas  Primary Cardiologist is Dr Diaz  Contact information and PCP information verified: Yes    Lives With: child(jean), adult, sibling(s), spouse  Living Arrangements: house  Quality Of Family Relationships: supportive, helpful, involved  Description of Support System: Supportive, Involved   Who is your support system?: Wife, Children, Sibling(s)   Support Assessment: Adequate family and caregiver support     Insurance concerns: No Insurance issues identified    ASSESSMENT  Patient currently receives the following services:  Pt lives at home with his wife, daughter and brother. He dialyzes at [facility] on [facility] and has a SAGE fistula. He does not currently receive Home Care Services and does not use home oxygen. His daughter is his PCA and she cares for him in the morning and the evenings. He does not know what PCA company she is working for. He is independent at home but uses a cane when out of the house. He still does some driving but his wife and daughter do most of the driving.        Identified issues/concerns regarding health management: none, pt newly admitted and will need additional IV antibiotics prior to dc  Met with pt at bedside regarding PNA diagnosis and elevated MUSHTAQ score. This is his 6th admit this year for various dx. Pt has had PNA in the past. He states he will probably be here for the next couple of days and anticipates discharging back home with his family. He does not have any questions or concerns about his dc back home. He feels he is able to manage his health at home. He and his wife will schedule follow up appts as needed.    PLAN  Patient/family is agreeable to the plan?   Yes  Patient anticipates discharging to home with family .  Resources List: PCA     Patient anticipates needs for home equipment: No  Discharge Planner   Discharge Plans in progress: home with family as prior  Barriers to discharge plan: none  Plan/Disposition: Home   Appointments: Pt states he and his wife will schedule follow up appts as needed on dc. Wife drives him to his appts.    Care  (CTS) will continue to follow as needed. Handoff will be sent via [employer] on dc to cts for PCP. Will cont to follow for dc plan of care.    Karen Botello RN CTS  Care Transitions  [phone]

## 2017-11-22 NOTE — PLAN OF CARE
Problem: Cardiac: ACS (Acute Coronary Syndrome) (Adult)  Goal: Signs and Symptoms of Listed Potential Problems Will be Absent, Minimized or Managed (Cardiac: ACS)  Signs and symptoms of listed potential problems will be absent, minimized or managed by discharge/transition of care (reference Cardiac: ACS (Acute Coronary Syndrome) (Adult) CPG).   Outcome: Improving  Orientation: Alert and oriented x4.   VS: Stable.   IVF: None at this time.   Tele: SR  LS: Clear and equal bilaterally.   GI: Passing gas. LB 11/20/2017  : Hemodialysis; urinal at bedside. Anusha yellow output.  Skin: Warm, dry, pale. AV fisulta on upper arm L.   Activity: SBA1  Pain: Denies

## 2017-11-22 NOTE — PLAN OF CARE
Problem: Patient Care Overview  Goal: Plan of Care/Patient Progress Review  Outcome: No Change  Up with assist of one, alert and oriented c/o weakness. C/o mid sternal chest pain that has been improving since admission per pt, on 2L NC O2 Sat 97%. LS clear, VSS, Tele SR. LBM 11/21, Left upper arm fistula Bruit and thrill present. C/o nausea Zofran given x 1 with pt report relief.

## 2017-11-22 NOTE — PHARMACY-ADMISSION MEDICATION HISTORY
Admission medication history interview status for this patient is complete. See EPIC admission navigator for allergy information, prior to admission medications and immunization status.     Medication history interview source(s):Patient  Medication history resources (including written lists, pill bottles, clinic record):Epic list and Dialysis list  Primary pharmacy:[facility]    Changes made to PTA medication list:  Added: tums  Deleted: prn amlodipine  Changed: amlodipine from 2.5 mg to 5 mg, losartan from, 100 mg daily to 25 mg daily. Lantus from 36 units to 50 units daily, adjusted Humalog from 5-15 units tid to 15 units tid with additional 2 units if BG above 150    Actions taken by pharmacist (provider contacted, etc):Calledb pt's pharmacy to double check strengths     Additional medication history information:Pt stated that he doesn't recall taking Cellcept. After investigating with pt's pharmacy, it was found that the pt last filled his cellcept prescription in January of 2017     Medication reconciliation/reorder completed by provider prior to medication history? No    Do you take OTC medications (eg tylenol, ibuprofen, fish oil, eye/ear drops, etc)? Y(Y/N)    For patients on insulin therapy: Y (Y/N)  Lantus/levemir/NPH/Mix 70/30 dose:   (Y) (see Med list for doses)   Sliding scale Novolog Y  If Yes, do you have a baseline novolog pre-meal dose:  15 units with meals    Time spent in this activity: 25 min    Prior to Admission medications    Medication Sig Last Dose Taking? Auth Provider   AMLODIPINE BESYLATE PO Take 5 mg by mouth daily 11/21/2017 at am Yes Unknown, Entered By History   insulin glargine (LANTUS) 100 UNIT/ML injection Inject 50 Units Subcutaneous every morning Take at 10 am 11/20/2017 at 1000 Yes Unknown, Entered By History   Insulin Lispro (HUMALOG PEN SC) Take 15 units TID and an additional 2 units if BG is over 150  Yes Unknown, Entered By History   LOSARTAN POTASSIUM PO  Take 25 mg by mouth every evening 11/20/2017 at pm Yes Unknown, Entered By History   calcium carbonate (TUMS) 500 MG chewable tablet Take 3 chew tab by mouth 3 times daily 11/21/2017 at am Yes Unknown, Entered By History   isosorbide mononitrate (IMDUR) 30 MG 24 hr tablet Take 3 tablets (90 mg) by mouth every evening 11/20/2017 at pm Yes Arnoldo Diaz MD   B COMPLEX-C-FOLIC ACID PO Take 1 tablet by mouth At Bedtime  11/20/2017 at pm Yes Reported, Patient   HYDRALAZINE HCL PO Take 25 mg by mouth 2 times daily as needed for high blood pressure  at prn Yes Unknown, Entered By History   albuterol (PROAIR HFA/PROVENTIL HFA/VENTOLIN HFA) 108 (90 BASE) MCG/ACT Inhaler Inhale 2 puffs into the lungs every 6 hours as needed for shortness of breath / dyspnea or wheezing  at prn Yes Nelson Worthy MD   Nutritional Supplements (NEPRO PO) 1-3 times daily 11/21/2017 at am Yes Unknown, Entered By History   omeprazole (PRILOSEC) 40 MG capsule Take 40 mg by mouth daily 1 hour before dinner 11/20/2017 at pm Yes Unknown, Entered By History   gabapentin (NEURONTIN) 300 MG capsule Take 600 mg by mouth every evening 11/20/2017 at pm Yes Unknown, Entered By History   imiquimod (ALDARA) 5 % cream Apply topically as needed  at prn Yes Reported, Patient   DOXERCALCIFEROL IV Inject 6 mcg into the vein three times a week (with dialysis)  Yes Reported, Patient   CLONAZEPAM PO Take 0.5 mg by mouth nightly as needed for anxiety (restless legs) Past Week at pm Yes Unknown, Entered By History   CLOPIDOGREL BISULFATE PO Take 75 mg by mouth every evening  11/20/2017 at pm Yes Unknown, Entered By History   abacavir (ZIAGEN) 300 MG tablet Take 600 mg by mouth every evening  11/20/2017 at pm Yes Abstract, Provider   chlorhexidine (CHLORHEXIDINE) 0.12 % solution Rinse and gargle 15 ml by mouth twice a day as directed. 11/21/2017 at am Yes Abstract, Provider   terbinafine (LAMISIL AT) 1 % cream Apply topically 2 times daily as needed   at prn Yes  "Abstract, Provider   atorvastatin (LIPITOR) 40 MG tablet Take 40 mg by mouth At Bedtime 11/20/2017 at pm Yes Unknown, Entered By History   epoetin brittni (EPOGEN,PROCRIT) 56052 UNIT/ML injection Inject 11,000 Units Subcutaneous three times a week WITH DIALYSIS  Yes Unknown, Entered By History   iron sucrose (VENOFER) 20 MG/ML injection Inject 50 mg into the vein once a week WIth dialysis  Yes Unknown, Entered By History   MAGNESIUM OXIDE PO Take 400 mg by mouth At Bedtime 11/20/2017 at pm Yes Unknown, Entered By History   dolutegravir (TIVICAY) 50 MG tablet Take 50 mg by mouth At Bedtime 11/20/2017 at pm Yes Unknown, Entered By History   nitroglycerin (NITROSTAT) 0.4 MG SL tablet One tablet under the tongue every 5 minutes if needed for chest pain. May repeat every 5 minutes for a maximum of 3 doses in 15 minutes\"  at prn Yes Lay Paz MD   gabapentin (NEURONTIN) 300 MG capsule Take 300 mg by mouth every morning  11/21/2017 at am Yes Unknown, Entered By History   dapsone 100 MG tablet Take 100 mg by mouth At Bedtime  11/20/2017 at pm Yes Reported, Patient   Darunavir Ethanolate (PREZISTA PO) Take 800 mg by mouth At Bedtime. 11/20/2017 at pm Yes Reported, Patient   ritonavir (NORVIR) 100 MG capsule Take 1 capsule by mouth At Bedtime  11/20/2017 at pm Yes Reported, Patient   order for DME Equipment being ordered: Other: 4WW  Treatment Diagnosis: Decreased activity tolerance   Lorna Jhaveri MD   mycophenolate (CELLCEPT - GENERIC EQUIVALENT) 500 MG tablet Take 500 mg by mouth 2 times daily On an empty stomach  at unsure if taking  Reported, Patient       "

## 2017-11-22 NOTE — H&P
IDENTIFICATION AND CHIEF COMPLAINT:  Mr. Donald Raza is a very complex 67-year-old gentleman who was directed to the Emergency Department from the [facility], I believe in [place], for further evaluation of chest pain.       HISTORY OF PRESENT ILLNESS:  Mr. Raza is a gentleman with diabetes and end-stage renal disease, for which he is on dialysis every Monday, Wednesday and Friday.  He was at dialysis today and apparently was told by the nurse that he may have pneumonia, and for that reason was directed to Urgent Care.  Evidently, the patient was noted to have some chest pain, which he states is similar to the chest pain for which he was evaluated last month.  He also though had been fatigued in the last couple of days with a mild cough.        In our Emergency Department, the patient underwent CT scanning that revealed a very mild patchy infiltrate on the right in the lower part of the upper lobe, but there was also concern for coronary artery disease based on the description of the patient's pain and his history of well-established coronary artery disease.  He did undergo an angiogram last month on 10/11, at which time he was felt to not have a clear cause for his discomfort.  This is despite evidence that he has very extensive disease in the left anterior descending.  He has much more diffuse on the right side.        In the Emergency Department, evaluation shows a normal white cell count and hemoglobin of 6.8, which is probably not far from his baseline.  He did undergo hemodialysis today and apparently slept through much of it.  His initial troponin is 0.021, which is well in the normal range.  EKG completed today is essentially normal.  Normal axes, normal intervals, no significant ST-T wave abnormalities.  Leads III and aVF appear slightly different compared with last month, but otherwise there are certainly no dynamic changes, nothing to suggest ongoing ischemia or recent acute  coronary syndrome.        REVIEW OF SYSTEMS:  The patient denies fevers, sweats, chills.  He has been very reliable on his usual medications.  He denies headaches and visual change.  No trouble with swallowing or eating.  Denies nausea, vomiting and stool change.  The patient describes pain that begins in his back and then seems to wrap around and rest more on the right side of the chest than the left.  He also describes some more intense pain centrally under the sternum.  He intermittently describes some shortness of breath with this.  When he was given morphine in the Emergency Department, he felt significant improvement.  Apparently, nitro also helped with his discomfort.  It is noted that the patient did have some increase in discomfort while he was active today.  The patient denies orthopnea and PND.  He has not had any wheezing.  Cough is minimal.  The patient does note that he has got rather chronic, persistent urinary frequency despite the fact that he urinates only a small amount daily.  He denies sores over his skin, but notes that he has pain on the bottoms of his feet associated with diabetic neuropathy.  The remainder of the comprehensive 10-system review is negative except as otherwise documented.        PRIOR MEDICAL HISTORY:     1.  Coronary artery disease, status post stenting multiple times.  Most recent coronary angiography 10/2017.    2.  End-stage renal disease, on hemodialysis.    3.  Hypertension.    4.  Hemodialysis-associated hypotension.   5.  HIV, for which the patient is very well managed and is compliant on medications.    6.  Urinary frequency.  There has been some issue about whether the patient is a good risk for biopsy, etc.        PRIOR SURGICAL HISTORY:     1.  Serial stenting and coronary angiography evaluations.    2.  History of diverticulitis, status post sigmoid resection with colostomy and followed by colostomy takedown.    3.  Remote laparoscopic cholecystectomy.    4.   Remote appendectomy.       FAMILY MEDICAL HISTORY:  Notable for patient's brother who had early aggressive coronary disease.  Hypertension and kidney disease is also noted in the family.        SOCIAL HISTORY:  The patient is from [place] and has a number of family members living close by.  He lives with his wife and a daughter as well as a brother.  He quit smoking more than 10 years ago.  He does not drink and does not use street drugs.       ALLERGIES:  Reported to calcium acetate, lisinopril, sulfa drugs.        MEDICATIONS:  Prior to this hospitalization:    1.  Amlodipine 5 mg p.o. daily.    2.  Lantus 50 units in the morning at 10:00 a.m.    3.  Lispro insulin 15 units t.i.d. with meals.    4.  Losartan 25 mg p.o. q. p.m.    5.  Calcium carbonate daily.   6.  Imdur 90 mg p.o. q. p.m.    7.  Vitamin B complex with vitamin C and folate 1 p.o. daily.    8.  Hydralazine 25 mg p.o. b.i.d.    9.  Albuterol 2 inhalations every 6 hours as needed.    10.  Nepro 1-3 times daily.    11.  Omeprazole 40 mg p.o. daily.    12.  Gabapentin 600 mg p.o. q. p.m.    13.  Clonazepam 0.5 mg p.o. q. p.m.    14.  Clopidogrel 75 mg p.o. q. p.m.    15.  Abacavir 600 mg p.o. q. p.m.    16.  Atorvastatin 40 mg p.o. at bedtime.    17.  Erythropoietin 11,000 units subcutaneously every dialysis.    18.  Venofer 50 mg IV weekly with dialysis.    19.  Magnesium oxide 400 mg p.o. at bedtime.    20.  Dolutegravir 50 mg p.o. at bedtime.    21.  Gabapentin 300 mg p.o. at bedtime.    22.  Dapsone 100 mg p.o. at bedtime.    23.  Darunavir 800 mg p.o. at bedtime.    24.  Ritonavir 1 capsule at bedtime.       PHYSICAL EXAMINATION:     GENERAL:  Mr. Raza is a very pleasant [place] male who is in no apparent distress when I met him in the Emergency Department.  He is articulate and coherent, although relatively tangential and sometimes difficult to understand.    VITAL SIGNS:  Temperature is 99.1, heart rate 67, blood pressure 144/73 (after the  patient received 5 mg IV metoprolol), respiration rate 18-22, oxygen saturation 94% on room air.  Weight on admission 88.2 kg as recorded in the chart.  Estimated body mass index 27.    HEENT:  Cranium is normocephalic and atraumatic.  The eyes are without remarkable scleral icterus of conjunctival injection.  Extraocular motions are conjugate.  Nares and oropharynx are free of obvious lesions.  There is no nasal discharge, no evidence for erythema in the back and throat.  The patient does, however, have posterior crowding in the oropharynx.     NECK:  Supple, without remarkable cervical or supraclavicular lymphadenopathy or thyromegaly.  Jugular venous distention is not significant elevated.    CHEST:  Notable for very fine rales, but no pain whatsoever with deep inspiration.  No wheeze, no rhonchi.    HEART:  Regular, without rubs, murmurs or gallops.  There is mild tenderness over the left anterior chest.     ABDOMEN:  Soft.  Mildly tender in the right upper quadrant/epigastric area.  No masses.  No hepatosplenomegaly.  No rebound or guarding is noted.  Bowel sounds are normal and active.     GENITOURINARY:  Deferred by me.    MUSCULOSKELETAL:  There is no remarkable tenderness over the posterior spinous processes or paraspinous muscles.  No deformities are noted in the extremities.  Distal pulses are palpable in all 4 extremities.  There are no lesions over the feet.  No remarkable edema over the ankles.  Muscle tone and bulk appears to be within normal limits.     NEUROLOGIC:  The patient is able to give a full history, although he is tangential and does take some extra time.  He is very pleasant and engaging, fully appropriate to the situation.  There is no evident lateralizing weakness.  Cranial nerves II-XII are grossly intact.     SKIN:  Free of obvious lesions over the exposed areas of the distal extremities, abdomen, back, head and neck.        DATA:  Laboratories:  Basic metabolic panel:  Creatinine  4.77, electrolytes normal, with an initial lactic acid of 2.7.  Troponin is 0.021 and glucose is 273.  White cell count 6.1 with hemoglobin of 6.8 and platelet count 144,000.  INR 0.97, PT 36, D-dimer 1.1.  Blood cultures were obtained, but unfortunately, I think these were completed after antibiotics were administered.       IMAGING:  Chest x-ray shows a few scattered densities in the right upper lobe, possible infiltrate.  CT scan of the chest also shows very mild findings on the right side in the mid lung field that are possibly associated with some type of infectious infiltrate.  No significant cardiomegaly.  No significant effusions.       EKG shows a normal sinus rhythm with normal axes and intervals.  No significant ST-T wave abnormalities.        ASSESSMENT:  Mr. Raza is a 67-year-old man with human immunodeficiency virus infection that is well controlled, end-stage renal disease for which he is on hemodialysis, and coronary artery disease.  I believe all of these issues are stable.  He also, of course, has anemia related to end-stage renal disease, some peripheral neuropathy related to diabetes, and hyperglycemia.  He presented with chest pain that sounds fairly similar to what he experienced last month, but I note that the evaluation at that time was negative and included echocardiogram as well as coronary angiography.  The patient's last stress test was completed in 06/2017, at which time there were No significant findings that could be associated with any apparent ischemic issue.        I am not convinced that the patient has either acute coronary syndrome or pneumonia.  He was started on ceftriaxone and azithromycin in the Emergency Department.  His blood pressure is elevated and I think it is appropriate to get this under better control, but otherwise I am not persuaded to start the patient on heparin for suspected acute coronary syndrome.  The patient's anemia is not very far from his baseline, and  although a unit of blood was ordered by the Emergency Room physician, the patient refused it, stating that he does not tolerate transfusion unless he is receiving it while on hemodialysis.       DIAGNOSES:    1.  Chest pain, unclear etiology.    2.  Pulmonary infiltrate on the right.  This is very mild appearing, and in the absence of cough, shortness of breath, hypoxia, elevated white cell count, I am not convinced that this is acute pneumonia.     3.  Anemia.  The patient's value of 6.8 today is slightly less than his baseline which runs between 7 and 9.  He does receive EPO on dialysis runs.  He may need a higher dose of erythropoietin.     4.  Inadequately controlled diabetes.  The glucose obviously is elevated, but I note that on chart review the patient's hemoglobin A1c's have been quite good.    5.  End-stage renal disease.  The patient will need to resume usual management.       PLAN:     1.  Admit to inpatient on Telemetry.    2.  Serial troponins.    3.  I am not convinced that the patient has pneumonia, but I think it is reasonable to continue with antibiotics at this point.  Will check a procalcitonin also.     4.  Consider cardiology consultation.    5.  Nephrology consultation for hemodialysis.    6.  Monitor hemoglobins.     7.  Usual medications resumed.         MAE LIM MD             D: 2017 20:28   T: 2017 22:14   MT: DIGNA      Name:     GREOGRIO BRUSH   MRN:      [id number]        Account:      [id number]   :      1950           Admitted:     [id number]      Document: [id number]

## 2017-11-22 NOTE — CONSULTS
[facility]    RENAL CONSULTATION NOTE    REFERRING MD:  Dr. Fortune    REASON FOR CONSULTATION:  ESKD, anemia, sob    DATE OF CONSULTATION: 11/22/17    SHORTHAND KEY FOR MY NOTES:  c = with, s = without, p = after, a = before, x = except, asx = asymptomatic, tx = transplant or treatment, sx = symptoms or symptomatic, cx = canceled or culture, rxn = reaction, yday = yesterday, nl = normal, abx = antibiotics, fxn = function, dx = diagnosis, dz = disease, m/h = melena/hematochezia, c/d/l/ha = cramping/dizziness/lightheadedness/headache, d/c = discharge or diarrhea/constipation, f/c/n/v = fevers/chills/nausea/vomiting, cp/sob = chest pain/shortness of breath.    HPI: Donald Raza is a 67 year old male c ESKD 2 DM2 who dialyses TRS via a LUAF at the [facility] under the care of Dr. Chavez and was admitted on 11/21/2017 c cp and malaise.    Pt states that he was not feeling well yday and had some cp/sob and fevers.  He didn't have any chills/n/v.  He did feel more fatigued than usual and had a cough.  The cp didn't radiate anywhere and was worse p HD.  As a result, he presented to the ER for further eval.    In the ER, a CXR showed some R-sided infiltrate and he was started on abx.  Today, he is feeling better and denies any f/c/n/v.  He is eating s probs.  His hb was low and he is getting blood.  He had some cramping in his legs, but that improved c o2.     ROS:  A complete review of systems was performed and is negative x as noted above.    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, recurrent     Bradycardia 5/28/2016     CAD S/P percutaneous coronary angioplasty 6/15/2015     Chest pain      CKD (chronic kidney disease)     Hemodialysis     Dilated aortic root (H) 5/6/2016     ESRD (end stage renal disease) on  dialysis (H) 5/6/2016     Hemodialysis-associated hypotension 5/22/2016     Human immunodeficiency virus (HIV) disease      Hypertension 2010     Hypotension, unspecified hypotension type 5/22/2016     Impotence of organic origin      Increased prostate specific antigen (PSA) velocity 08/08/2016    Awaiting bx on blood thinner     Mixed hyperlipidemia      Near syncope 2016    with hemodialysis     NSTEMI (non-ST elevated myocardial infarction) (H) 12/2015, 5/2016     Pulmonary HTN     Mod     TIA (transient ischemic attack) 5/2016     Type 2 diabetes mellitus (H) age 50     Unstable angina (H) 3/4/2016     PSH:    Past Surgical History:   Procedure Laterality Date     ANGIOGRAM  03-04-16    No culprit lesions, stents widely patent      ANGIOGRAM  05-06-16    Cutting balloon ptca=Diag     APPENDECTOMY  2000     CHOLECYSTECTOMY, LAPOROSCOPIC       COLOSTOMY  09/30/1999    Temporary for diverticulitis     HEART CATH, ANGIOPLASTY  08-18-16    LAD PCI. Stented with a 3.0 x 8 mm Xience Alpine stent.     STENT, CORONARY, S660 15/18  12/2015    VANITA=Diag, PTCA=LAD     STENT, CORONARY, S660 15/18  06/2015    VANITA=LAD     MEDICATIONS:      [START ON 11/23/2017] insulin aspart  5 Units Subcutaneous QAM AC     insulin aspart  1-7 Units Subcutaneous TID AC     insulin aspart  1-5 Units Subcutaneous At Bedtime     - MEDICATION INSTRUCTIONS for Dialysis Patients -   Does not apply See Admin Instructions     insulin glargine  20 Units Subcutaneous QAM AC     abacavir  600 mg Oral QPM     amLODIPine (NORVASC) tablet 5 mg  5 mg Oral Daily     atorvastatin  40 mg Oral At Bedtime     calcium carbonate  1,500 mg Oral TID     clopidogrel (PLAVIX) tablet 75 mg  75 mg Oral QPM     darunavir  800 mg Oral At Bedtime     dolutegravir  50 mg Oral At Bedtime     gabapentin  300 mg Oral QAM     gabapentin  600 mg Oral QPM     isosorbide mononitrate  90 mg Oral QPM     losartan (COZAAR) tablet 25 mg  25 mg Oral QPM     magnesium oxide (MAG-OX)  tablet 400 mg  400 mg Oral At Bedtime     omeprazole  40 mg Oral Daily     ritonavir  100 mg Oral At Bedtime     cefTRIAXone  1 g Intravenous Q24H     azithromycin  250 mg Oral Daily     sodium chloride (PF)  3 mL Intracatheter Q8H     ALLERGIES:    Allergies as of 11/21/2017 - Danny as Reviewed 11/21/2017   Allergen Reaction Noted     Calcium acetate Other (See Comments) 07/27/2017     Diagnostic x-ray materials Other (See Comments) 09/24/2012     Lisinopril  07/23/2012     Sulfa drugs  07/23/2012     FH:    Family History   Problem Relation Age of Onset     HEART DISEASE Brother 40     CABG     KIDNEY DISEASE Sister      Hypertension Sister      HEART DISEASE Brother      Dilated aorta     SH:    Social History     Social History     Marital status:      Spouse name: N/A     Number of children: N/A     Years of education: N/A     Occupational History     Not on file.     Social History Main Topics     Smoking status: Former Smoker     Types: Cigarettes     Smokeless tobacco: Never Used     Alcohol use No     Drug use: No     Sexual activity: Not on file     Other Topics Concern     Parent/Sibling W/ Cabg, Mi Or Angioplasty Before 65f 55m? Yes     Caffeine Concern Yes     2 cups daily     Sleep Concern Yes     Special Diet No      more proteins     Exercise Yes     Cardiac rehab      Social History Narrative     PHYSICAL EXAM:    /65  Pulse 81  Temp 98.8  F (37.1  C) (Oral)  Resp 18  Wt 88.4 kg (194 lb 14.4 oz)  SpO2 97%  BMI 27.18 kg/m2    GENERAL: awake, alert, NAD  HEENT:  Normocephalic. No gross abnormalities.  MMM.  Edentulous.  Pupils equal.  EOMI.  No scleral icterus.  CV: RRR c 2/6 murmurs, no clicks, gallops, or rubs, no significant ble edema.  RESP: Clear bilaterally with good efforts, + nc o2  GI: Abdomen o/s/nt/nd, BS present. No masses, organomegaly  MUSCULOSKELETAL: extremities nl - no gross deformities noted  SKIN: no suspicious lesions or rashes, dry to touch  NEURO:  Strength  normal and symmetric.   PSYCH: mood good, affect appropriate  LYMPH: No palpable ant/post cervical and supraclavicular adenopathy  OTHER:  Access - LUAF c good thrill/bruit    LABS:      CBC RESULTS:     Recent Labs  Lab 11/22/17  0547 11/21/17  1405   WBC 4.6 6.1   RBC 2.05* 2.35*   HGB 5.9* 6.8*   HCT 19.2* 21.5*   * 144*     BMP RESULTS:    Recent Labs  Lab 11/22/17  0547 11/21/17  1405    132*   POTASSIUM 4.3 4.1   CHLORIDE 96 95   CO2 29 29   BUN 37* 25   CR 6.80* 4.77*   * 273*   PRABHA 8.8 8.8     INR  Recent Labs  Lab 11/21/17  1405   INR 0.97      DIAGNOSTICS:  Personally reviewed CXR - possible R infiltrate    A/P:  Donald Raza is a 67 year old male c ESKD who has possible pneumonia and severe anemia.    1.  ESKD.  Pt runs on a TRS schedule.  Chemistries and volume are ok.  A.  HD tmrw.    2.  Anemia.  Pt's hb is low again.  This usually happens when he gets infected.  His hb is generally in the 8s.  He is getting blood now.  No signs of bleeding.  A.  Follow hb.  B.  If hb < 8 tmrw, then would give 1-2 units of blood.    3.  HTN.  Pt's BP is ok.  On multiple meds.  Volume is ok.  A.  Continue same meds.  B.  Follow BPs.    4.  R pneumonia.  He is on supplemental o2.  On abx.  A.  Complete abx course.    5.  FEN.  On a dialysis diet.  A.  Continue same diet.    Thank you for this consultation. We will follow c you.  Please call if any questions.    Attestation:   I have reviewed today's relevant vital signs, notes, medications, labs and imaging.    Bipin Chaves MD  [facility] - Nephrology  [phone]

## 2017-11-22 NOTE — PROGRESS NOTES
[facility]  Hospitalist Progress Note  Ezra Fortune MD 11/22/17    Reason for Stay (Diagnosis): pneumonia, anemia         Assessment and Plan:      Summary of Stay:   Donald Raza is a 67 year old male with pmhx of AIDs on HAART, ESRD on HD, HTN, CAD s/p stents, anemia, and thrombocytopenia who was admitted on 11/21/2017 with chest pain.  Found to have R sided pneumonia on CTPA and no PE.  Hg lower than baseline and down to 5.9 next day.  Receiving one unit RBCs for symptomatic anemia.  Is on a small amount of oxygen here via NC.  No fevers or leukocytosis.  procalcitonin elevated.  Started on ceftriaxone/azithromycin for pneumonia and will monitor.  Serial troponin negative and doubt ischemic etiology for pain.     Problem List/Assessment and Plan:   Suspect CAP: R sided chest pain that is constant and some sob.  Denies cough, but is actively coughing when I am in the room.  No fever or leukocytosis.  His chest CT shows no PE, but does show a R sided infiltrate.  His procalcitonin is elevated at > 2.  Viral load suppressed.  Last CD4 was 149.  Is on dapsone hs for pcp prophylaxis presumably.  Is on 2.5L O2 but high O2 sats.  -continue ceftriaxone/azithromycin for now  -low threshold for ID consultation if not improving given low CD4    Acute on chronic anemia: Hg 6.8 down to 5.9.  Anemia of chronic disease with HG 8-9 baseline.  Has had transfusions before and for some reason they have been irradiated.  I cannot see explanation as to why nor based on his history an indication for this.  He has HIV that is under control and ESRD, neither of which would require irradiation.  Tolerates transfusion much better on HD.  -transfuse one unit RBCs today slowly as he is symptomatic from his anemia and in setting of significant CAD.  I think future transfusion standard not irradiated should be fine  -repeat Hg 1-2 hours after transfusion, if Hg > 6-6.5 and asymptomatic likely can wait until tomorrow for  transfusion on dialysis  -plan for likely additional 1-2 units RBCs on HD tomorrow morning    Chest pain with significant CAD hx: hx of multiple stents and angiogram 10/2017 for chest pain that showed no obvious targets then to explain symptoms.  Medical management.  R sided chest pain radiating to back now that is worse I think is from his pneumonia as this is the location on CT and crackles on exam.  Serial troponin all 0.02.    -pain management for now and defer further ischemic w/u as less likely cardiac    ESRD on HD: HD T,Th,Sat.  Last HD 11/21 full run.  L arm fistula.    -nephrology consulted  -HD tomorrow    HTN: -150.  PTA regimen includes amlodipine 5mg daily, imdur 90mg daily, losartan 25mg daily, hydralazine prn.  -resume pta regimen    IDDM type II: pta on lantus 55U daily am and 15 U lispro tid with meals.  He has not taken any insulin in a few days and BG  140-270 here.    -restart lantus at 20U this am  -5U aspart with meals and medium dose SSI  -further adjustments as necessary     Thrombocytopenia: plts 115k.  Baseline.  Monitor.    AIDs: last CD4 149 with suppressed viral load.  On HAART and dapsone.  -resume pta HAART and dapsone    DVT Prophylaxis: Pneumatic Compression Devices  Code Status: Full Code  FEN: dialysis diet  Discharge Dispo: home  Estimated Disch Date / # of Days until Disch: 1-2 days if stable Hg and off O2        Interval History (Subjective):      Assumed care today.  Admitted for some chest pain and presumed pneumonia.  Has some mild ongoing R sided chest pain that radiates to back.  This has been ongoing for awhile and not changing today.  Is a little more sob today.  Also has been having more light headedness when sitting up that is now present laying down this morning.  Feels very fatigued/tired today.  Denies melena or hematochezia.                  Physical Exam:      Last Vital Signs:  /57 (BP Location: Right arm)  Pulse 81  Temp 98.5  F (36.9  C) (Oral)   Resp 18  Wt 88.4 kg (194 lb 14.4 oz)  SpO2 94%  BMI 27.18 kg/m2      Intake/Output Summary (Last 24 hours) at 11/22/17 1209  Last data filed at 11/21/17 2200   Gross per 24 hour   Intake              320 ml   Output                0 ml   Net              320 ml       Constitutional: Awake, NAD  Eyes: sclera white   HEENT:  MMM  Respiratory: few crackles on the R.  No wheeze  Cardiovascular: RRR.  No murmur   GI: soft, non-tender, not distended, bowel sounds present  Skin: no rash   Musculoskeletal/extremities:  No edema  Neurologic: A&O  Psychiatric: calm, cooperative         Medications:      All current medications were reviewed with changes reflected in problem list.         Data:      All new lab and imaging data was reviewed.   Labs:    Recent Labs  Lab 11/22/17  0547 11/21/17  1405    132*   POTASSIUM 4.3 4.1   CHLORIDE 96 95   CO2 29 29   ANIONGAP 9 8   * 273*   BUN 37* 25   CR 6.80* 4.77*   GFRESTIMATED 8* 12*   GFRESTBLACK 10* 15*   PRABHA 8.8 8.8       Recent Labs  Lab 11/22/17  0547 11/21/17  1405   WBC 4.6 6.1   HGB 5.9* 6.8*   HCT 19.2* 21.5*   MCV 94 92   * 144*       Recent Labs  Lab 11/22/17  0547 11/21/17  2346 11/21/17  1405   TROPI 0.024 0.026 0.021      Imaging:   None today      Ezra Fortune MD

## 2017-11-22 NOTE — PROGRESS NOTES
Paged re: new onset SOB, chills, exp wheezes - he had blood transfusion with dilaysis today    Pt does have PNA-     Will try duonebs, he still makes little urine so will give 1 dose of IV lasix   Check blood culture and lactic acid   Cont same abx   If no improvement, will obtain CXR and start BIPAP if needed     Paged re: L sided chest pain.      Will check trops, stat Hb and treat with low dose prn dilaudid . He may have anginal symptoms due to profound anemia

## 2017-11-22 NOTE — PROGRESS NOTES
Notes reviewed.  Full consult later.  Pt admitted c another pneumonia, anemia.  Had HD yday - TRS schedule.    1. ESKD.  HD tmrw.  2. R pneumonia.  On azithro.  3. Anemia.  Follow hb. If he needs a transfusion, we can give it tmrw on HD if he is still inpt.

## 2017-11-22 NOTE — PROVIDER NOTIFICATION
Dr. Fortune paged: critical lab: Hgb 5.9  Dr. Fortune and hospitalist paged: Blood done at 1545. new onset on SOB, chills, exp. Wheezes  1840: Dr. Kenyon paged: giving Lasix now- new onset of chest pain. Temp 100

## 2017-11-23 LAB
ABO + RH BLD: NORMAL
ABO + RH BLD: NORMAL
ANION GAP SERPL CALCULATED.3IONS-SCNC: 11 MMOL/L (ref 3–14)
BLD GP AB SCN SERPL QL: NORMAL
BLD PROD TYP BPU: NORMAL
BLD UNIT ID BPU: 0
BLD UNIT ID BPU: 0
BLOOD BANK CMNT PATIENT-IMP: NORMAL
BLOOD PRODUCT CODE: NORMAL
BLOOD PRODUCT CODE: NORMAL
BPU ID: NORMAL
BPU ID: NORMAL
BUN SERPL-MCNC: 58 MG/DL (ref 7–30)
CALCIUM SERPL-MCNC: 8.8 MG/DL (ref 8.5–10.1)
CHLORIDE SERPL-SCNC: 96 MMOL/L (ref 94–109)
CO2 SERPL-SCNC: 26 MMOL/L (ref 20–32)
CREAT SERPL-MCNC: 9.24 MG/DL (ref 0.66–1.25)
ERYTHROCYTE [DISTWIDTH] IN BLOOD BY AUTOMATED COUNT: 15.7 % (ref 10–15)
GFR SERPL CREATININE-BSD FRML MDRD: 6 ML/MIN/1.7M2
GLUCOSE BLDC GLUCOMTR-MCNC: 104 MG/DL (ref 70–99)
GLUCOSE BLDC GLUCOMTR-MCNC: 117 MG/DL (ref 70–99)
GLUCOSE BLDC GLUCOMTR-MCNC: 153 MG/DL (ref 70–99)
GLUCOSE BLDC GLUCOMTR-MCNC: 197 MG/DL (ref 70–99)
GLUCOSE BLDC GLUCOMTR-MCNC: 237 MG/DL (ref 70–99)
GLUCOSE BLDC GLUCOMTR-MCNC: 274 MG/DL (ref 70–99)
GLUCOSE SERPL-MCNC: 140 MG/DL (ref 70–99)
HCT VFR BLD AUTO: 21.7 % (ref 40–53)
HGB BLD-MCNC: 6.7 G/DL (ref 13.3–17.7)
MCH RBC QN AUTO: 28.8 PG (ref 26.5–33)
MCHC RBC AUTO-ENTMCNC: 30.9 G/DL (ref 31.5–36.5)
MCV RBC AUTO: 93 FL (ref 78–100)
NUM BPU REQUESTED: 3
PLATELET # BLD AUTO: 113 10E9/L (ref 150–450)
POTASSIUM SERPL-SCNC: 4.6 MMOL/L (ref 3.4–5.3)
RBC # BLD AUTO: 2.33 10E12/L (ref 4.4–5.9)
SODIUM SERPL-SCNC: 133 MMOL/L (ref 133–144)
SPECIMEN EXP DATE BLD: NORMAL
TRANSFUSION STATUS PATIENT QL: NORMAL
TROPONIN I SERPL-MCNC: 0.03 UG/L (ref 0–0.04)
WBC # BLD AUTO: 6.8 10E9/L (ref 4–11)

## 2017-11-23 PROCEDURE — 25000128 H RX IP 250 OP 636: Performed by: INTERNAL MEDICINE

## 2017-11-23 PROCEDURE — P9016 RBC LEUKOCYTES REDUCED: HCPCS | Performed by: EMERGENCY MEDICINE

## 2017-11-23 PROCEDURE — 25000132 ZZH RX MED GY IP 250 OP 250 PS 637: Mod: GY | Performed by: INTERNAL MEDICINE

## 2017-11-23 PROCEDURE — 63400005 ZZH RX 634: Performed by: INTERNAL MEDICINE

## 2017-11-23 PROCEDURE — 85027 COMPLETE CBC AUTOMATED: CPT | Performed by: INTERNAL MEDICINE

## 2017-11-23 PROCEDURE — 36415 COLL VENOUS BLD VENIPUNCTURE: CPT | Performed by: INTERNAL MEDICINE

## 2017-11-23 PROCEDURE — 99233 SBSQ HOSP IP/OBS HIGH 50: CPT | Performed by: INTERNAL MEDICINE

## 2017-11-23 PROCEDURE — 12000007 ZZH R&B INTERMEDIATE

## 2017-11-23 PROCEDURE — 00000146 ZZHCL STATISTIC GLUCOSE BY METER IP

## 2017-11-23 PROCEDURE — 40000274 ZZH STATISTIC RCP CONSULT EA 30 MIN

## 2017-11-23 PROCEDURE — 5A1D70Z PERFORMANCE OF URINARY FILTRATION, INTERMITTENT, LESS THAN 6 HOURS PER DAY: ICD-10-PCS | Performed by: INTERNAL MEDICINE

## 2017-11-23 PROCEDURE — 40000275 ZZH STATISTIC RCP TIME EA 10 MIN

## 2017-11-23 PROCEDURE — 25000131 ZZH RX MED GY IP 250 OP 636 PS 637: Mod: GY | Performed by: INTERNAL MEDICINE

## 2017-11-23 PROCEDURE — 84484 ASSAY OF TROPONIN QUANT: CPT | Performed by: HOSPITALIST

## 2017-11-23 PROCEDURE — 25000125 ZZHC RX 250: Performed by: HOSPITALIST

## 2017-11-23 PROCEDURE — 94640 AIRWAY INHALATION TREATMENT: CPT

## 2017-11-23 PROCEDURE — 80048 BASIC METABOLIC PNL TOTAL CA: CPT | Performed by: INTERNAL MEDICINE

## 2017-11-23 PROCEDURE — 94640 AIRWAY INHALATION TREATMENT: CPT | Mod: 76

## 2017-11-23 PROCEDURE — A9270 NON-COVERED ITEM OR SERVICE: HCPCS | Mod: GY | Performed by: INTERNAL MEDICINE

## 2017-11-23 PROCEDURE — 90937 HEMODIALYSIS REPEATED EVAL: CPT

## 2017-11-23 PROCEDURE — 36415 COLL VENOUS BLD VENIPUNCTURE: CPT | Performed by: HOSPITALIST

## 2017-11-23 RX ORDER — ALBUTEROL SULFATE 0.83 MG/ML
2.5 SOLUTION RESPIRATORY (INHALATION)
Status: DISCONTINUED | OUTPATIENT
Start: 2017-11-23 | End: 2017-11-28 | Stop reason: HOSPADM

## 2017-11-23 RX ORDER — ALBUMIN, HUMAN INJ 5% 5 %
250 SOLUTION INTRAVENOUS
Status: DISCONTINUED | OUTPATIENT
Start: 2017-11-23 | End: 2017-11-23

## 2017-11-23 RX ORDER — ALBUMIN (HUMAN) 12.5 G/50ML
50 SOLUTION INTRAVENOUS
Status: DISCONTINUED | OUTPATIENT
Start: 2017-11-23 | End: 2017-11-23

## 2017-11-23 RX ADMIN — IPRATROPIUM BROMIDE AND ALBUTEROL SULFATE 3 ML: .5; 3 SOLUTION RESPIRATORY (INHALATION) at 19:21

## 2017-11-23 RX ADMIN — IPRATROPIUM BROMIDE AND ALBUTEROL SULFATE 3 ML: .5; 3 SOLUTION RESPIRATORY (INHALATION) at 07:43

## 2017-11-23 RX ADMIN — DOLUTEGRAVIR SODIUM 50 MG: 50 TABLET, FILM COATED ORAL at 21:10

## 2017-11-23 RX ADMIN — CALCIUM CARBONATE (ANTACID) CHEW TAB 500 MG 1500 MG: 500 CHEW TAB at 16:10

## 2017-11-23 RX ADMIN — AZITHROMYCIN 250 MG: 250 TABLET, FILM COATED ORAL at 16:10

## 2017-11-23 RX ADMIN — DAPSONE 100 MG: 100 TABLET ORAL at 21:07

## 2017-11-23 RX ADMIN — MAGNESIUM OXIDE TAB 400 MG (241.3 MG ELEMENTAL MG) 400 MG: 400 (241.3 MG) TAB at 21:07

## 2017-11-23 RX ADMIN — DARUNAVIR 800 MG: 800 TABLET, FILM COATED ORAL at 21:09

## 2017-11-23 RX ADMIN — TAZOBACTAM SODIUM AND PIPERACILLIN SODIUM 2.25 G: 250; 2 INJECTION, SOLUTION INTRAVENOUS at 18:05

## 2017-11-23 RX ADMIN — ATORVASTATIN CALCIUM 40 MG: 40 TABLET, FILM COATED ORAL at 21:07

## 2017-11-23 RX ADMIN — AMLODIPINE BESYLATE 5 MG: 5 TABLET ORAL at 13:01

## 2017-11-23 RX ADMIN — LOSARTAN POTASSIUM 25 MG: 25 TABLET, FILM COATED ORAL at 21:07

## 2017-11-23 RX ADMIN — INSULIN GLARGINE 20 UNITS: 100 INJECTION, SOLUTION SUBCUTANEOUS at 10:06

## 2017-11-23 RX ADMIN — ABACAVIR 600 MG: 300 TABLET, FILM COATED ORAL at 21:10

## 2017-11-23 RX ADMIN — SODIUM CHLORIDE 250 ML: 9 INJECTION, SOLUTION INTRAVENOUS at 09:22

## 2017-11-23 RX ADMIN — ACETAMINOPHEN 975 MG: 325 TABLET, FILM COATED ORAL at 16:10

## 2017-11-23 RX ADMIN — GABAPENTIN 600 MG: 300 CAPSULE ORAL at 21:07

## 2017-11-23 RX ADMIN — VANCOMYCIN 2000 MG: 1 INJECTION, SOLUTION INTRAVENOUS at 12:54

## 2017-11-23 RX ADMIN — CLOPIDOGREL 75 MG: 75 TABLET, FILM COATED ORAL at 21:07

## 2017-11-23 RX ADMIN — ISOSORBIDE MONONITRATE 90 MG: 60 TABLET, EXTENDED RELEASE ORAL at 21:06

## 2017-11-23 RX ADMIN — CALCIUM CARBONATE (ANTACID) CHEW TAB 500 MG 1500 MG: 500 CHEW TAB at 21:06

## 2017-11-23 RX ADMIN — OMEPRAZOLE 40 MG: 20 CAPSULE, DELAYED RELEASE ORAL at 16:10

## 2017-11-23 RX ADMIN — GABAPENTIN 300 MG: 300 CAPSULE ORAL at 13:01

## 2017-11-23 RX ADMIN — CALCIUM CARBONATE (ANTACID) CHEW TAB 500 MG 1500 MG: 500 CHEW TAB at 13:00

## 2017-11-23 RX ADMIN — RITONAVIR 100 MG: 100 TABLET, FILM COATED ORAL at 21:09

## 2017-11-23 RX ADMIN — ERYTHROPOIETIN 15000 UNITS: 10000 INJECTION, SOLUTION INTRAVENOUS; SUBCUTANEOUS at 09:41

## 2017-11-23 RX ADMIN — ACETAMINOPHEN 975 MG: 325 TABLET, FILM COATED ORAL at 00:25

## 2017-11-23 RX ADMIN — INSULIN ASPART 2 UNITS: 100 INJECTION, SOLUTION INTRAVENOUS; SUBCUTANEOUS at 16:08

## 2017-11-23 ASSESSMENT — ACTIVITIES OF DAILY LIVING (ADL)
ADLS_ACUITY_SCORE: 9

## 2017-11-23 NOTE — PROGRESS NOTES
Reviewed chart this am.  Quite febrie overnight and more hypoxic.  Low cd4 of 149 most recently so opportunistic infection possible.  Will ask for ID assistance to see if appropriate abx initially.  Also Hg 6.7.  Ordered 1 unit RBCs to be given on dialysis.  Reviewed chart and I do not see any indication for irradiated RBCs so will go ahead and use non-irradiated RBCs    ADDENDUM:    Spoke with blood bank again today regarding patient not having indications for irradiated blood products.  I was informed it is in the system on their end that he does need them from the [facility] and that this cannot be changed without supervisor approval who will be back in on Monday.  He cannot release any non-irradiated products as the system does not allow him too.  He has already called for the irradiated red blood cells and they will be here in one hour.  I asked that he have this rushed to the dialysis unit on arrival so that it can still be given on dialysis.    ADDENDUM:  Spoke with lab again.  Supervisor was contacted.  Apparently was listed as having 4 medications that would indicate need for irradiated products, none of which is he on currently.  I cannot see any records that he was on them in the past either.  The meds listed are mostly for treatment a of a leukemia or lymphoma.  -will get non-irradiated products now  -discussed with nephrology will give 2 units RBCs on dialysis instead of 1

## 2017-11-23 NOTE — PROGRESS NOTES
"Former[facility] RCAT     Date: 11/23/17  Admission Dx: Chest Pain  Pulmonary History: PNA, CAD, AIDS  Home Nebulizer/MDI Use: No  Home Oxygen: No  Acuity Level (RCAT flow sheet): 3  Aerosol Therapy initiated: Duoneb QID, Alb Q2prn      Pulmonary Hygiene initiated: Deep breathing exercise      Volume Expansion initiated: IS      Current Oxygen Requirements: 3 liters NC  Current SpO2: 92%    Re-evaluation date: 11/26/17    Patient Education: Patient was explained about his breathing treatment.       See \"RT Assessments\" flow sheet for patient assessment scoring and Acuity Level Details.             "

## 2017-11-23 NOTE — PLAN OF CARE
Neuro: A&O x 4  Resp: SOB, exp. Wheezes, productive cough. 02 2.5L  Cardiac: SR  GI/: Fistula LUE with thrill and bruit  Mobility: assist x 1 with cane  Pain: R sided chest pain- chronic for pt. Denies need for intervention  IV: SL  Plan: Continue ABX, re-check hgb, dialysis tomorrow  Nephrology following  BC, LA, nebs, IV lasix x 1 dose for new onset of SOB, Exp. Wheezes, chills after blood.     Dr. Fortune paged: critical lab: Hgb 5.9  Dr. Fortune and hospitalist paged: Blood done at 1545. new onset on SOB, chills, exp. Wheezes  1840: Dr. Kenyon paged: giving Lasix now- new onset of chest pain. Temp 100   L sided CP, new for pt

## 2017-11-23 NOTE — CONSULTS
ID consult dictated IMP 1 68 yo male ESRD, HIv stable with low T cells, multiple episodes conventional resp infections in past, another episode now, unlikely OI but quite probably health care assoc    REC vanco/zosyn, complete Z oniel, continue HIV meds, if worsens ? Bronch

## 2017-11-23 NOTE — PROVIDER NOTIFICATION
MD paged - Tylenol given at 2000, pts temp remains above 101 w/ice packs in use. Please advise    MD returned call to give another dose of tylenol

## 2017-11-23 NOTE — CONSULTS
INFECTIOUS DISEASE CONSULTATION       REQUESTING PHYSICIAN:  Ezra Fortune MD      IMPRESSION:   1.  A 67-year-old male very well-known to me through multiple hospitalizations, admitted with acute respiratory symptoms, found to have a degree of infiltrate, probably conventional pneumonia.  In the past he has had multiple episodes similar to this generally responding to conventional antibiotics, no positive cultures to date, but clinical worsening on ceftriaxone and Zithromax.  I think that is more likely due to a more resistant subhash of bacteria, possibly health care-associated than due to opportunistic infection.   2.  History of longstanding HIV infection with T-cells generally in the 140-200 range, most recently 149, has been on chronic prophylactic dapsone, relatively unlikely to have opportunistic infection despite the low T-cells.   3.  End-stage renal disease on dialysis, thus frequent Health Care contact.   4.  Multiple respiratory infections in the past, severe influenza earlier this year, prior to that at least one intubated severe pneumonia, some perception of possible aspiration or chronic lung disease in the past, but neither formally diagnosed.   5.  History of coronary artery disease.   6.  Sulfa allergy.      RECOMMENDATIONS:   1.  Complete a Z-HELEN.   2.  Increase antibiotics to include vancomycin and Zosyn, the vancomycin of course easily accomplished here on dialysis, relatively unlikely to have MRSA, has never been colonized before, but should cover for now given the ease of coverage.   3.  If not clinically responding we will attempt to get further diagnostic studies, possibly even bronchoscopy. but I think for now simply treating is acceptable.   4.  Continue HIV medications and dapsone on same regimen that has been successful at controlling virus previously.      HISTORY OF PRESENT ILLNESS:  The patient Donald Raza is a 67-year-old male is very well- known to me.  I saw him in April 2017  when he was hospitalized, but he was hospitalized several months ago.  He has had a history of recurrent respiratory infections, generally conventional and responding to antibiotics.  One of the episodes required intubation, but most of them have responded to conventional antibiotics.  He has no history of opportunistic infection despite HIV infection with T-cells below 200 for a number of years.  He is on a regimen that is controlling the virus without difficulty, and T-cells are simply chronically low.  He has been on chronic prophylactic dapsone due to sulfa allergy, and thus Pneumocystis is very unlikely.  He is currently in Dialysis.      The current illness came on Tuesday when the patient was at dialysis.  He had shortness of breath which progressed to the point that he sought medical attention.  He did not perceive fever, but since admission he has developed temperatures into the 101 range.  If anything, worsening of his respiratory status has occurred on ceftriaxone and Zithromax.  There are no positive cultures to date.  He has no other focal or localizing symptoms.  For the most part his cough is nonproductive.      PAST MEDICAL HISTORY:     1.  Long and complicated history of coronary artery disease.   2.  History of end-stage renal disease on long-term dialysis.   3.  Longstanding history of HIV infection, well controlled on current regimen.   4.  Multiple respiratory infections previously as noted above, including influenza earlier this year.      SOCIAL HISTORY:  The patient has had extensive Health Care contacts including hospitalizations and chronic dialysis.      ALLERGIES:  Sulfa caused a rash in the past.      MEDICATIONS:  As listed.      REVIEW OF SYSTEMS:  Positive for cough, a sense of shortness of breath, had not noticed fevers and chills, but had both of those last night, no other focal pain, rash or skin lesions.  The patient has had no recent exposures and has not been around anyone who  has been ill.  He has done no recent traveling and has no history of tuberculosis or other major infection.      PHYSICAL EXAMINATION:   GENERAL:  The patient appears his stated age, does not look toxic or ill, but looks mildly hypoxic on oxygen.   VITAL SIGNS:  Respiratory rate of 24, febrile to 101 last p.m., down currently.   HEENT:  No thrush or intraoral lesions, no facial rash.   NECK:  Supple and nontender, no lymphadenopathy, thyromegaly, or meningismus.   HEART:  Regular rhythm, no murmur or gallop.   LUNGS:  Clear bilaterally, although with a few rhonchi at both bases and in the right lateral lung field.   ABDOMEN:  Soft and nontender.   EXTREMITIES:  There is a degree of edema.   SKIN:  Dialysis access looks unremarkable.   PSYCHIATRIC:  Mentation is at baseline.      LABORATORY AND IMAGING RESULTS:    1.  Chest x-ray with fluffy infiltrates.     2.  Blood cultures negative.     3.  White blood cell count generally in the normal range.   4.  There was lymphadenopathy seen on CT scan, but that has been seen in the past.      Thank you very much for this consultation.  I will follow the patient with you.         ASHOK MI MD             D: 2017 09:50   T: 2017 10:44   MT: [id number]      Name:     GREGORIO BRUSH   MRN:      [id number]        Account:       [id number]   :      1950           Consult Date:  2017      Document: [id number]       cc: Aida Fortune MD

## 2017-11-23 NOTE — PLAN OF CARE
Problem: Patient Care Overview  Goal: Plan of Care/Patient Progress Review  Outcome: No Change  Pt. On Droplet Iso for PNA. Pt. AxOx4, lethargic, Temp elevated up to 101.3, ice packs provided and tylenol was given at 2000. Bld cx were done. Tele: SR with peaked T waves, murmur present, up with assist of 1 and cane. Pt. Has baseline numbness to toes.  at HS. Lung sounds diminished, exp. Wheezes, coarse, congested cough, productive-yellow/brown phlegm. Troponin-neg thus far. Lactic acid 1.5. Dr. Kenyon's medical student did come to assess pt. And advised to administer tylenol for fever, and monitor troponin. Pt. Was given lasix earlier on evenings. Pt. Is a dialysis, Bladder scanned for 100cc. Pt. Has positive bruit and thrill to left arm fistula. Pt. Denies any needs at this time. Will continue with POC.

## 2017-11-23 NOTE — PROGRESS NOTES
Potassium   Date Value Ref Range Status   11/23/2017 4.6 3.4 - 5.3 mmol/L Final     Lab Results   Component Value Date    HGB 6.7 11/23/2017     Weight: 90.7 kg (200 lb)  POST WT 87.2kg  DIALYSIS PROCEDURE NOTE  Hepatitis status of previous patient on machine log was checked and verified ok to use with this patients hepatitis status.  Patient dialyzed for 3.5 hrs. on a 2 K bath with a net fluid removal of  3.5L.  A BFR of 400 ml/min was obtained via a LAF using 15gauge needles.Dr. Chaves was consulted of pt status during the treatment.  Total heparin received during the treatment: 0 units. Sites held x 25 min then  pressure drsgs applied.  Meds  Given:EPO 15,000units IV and 2 units PRBC's on the run. Complications: NONE.  Procedure and ESRD teaching done and questions answered. See flowsheet in EPIC for further details and post assessment.  Machine water alarm in place and functioning.  Pt returned via  wheelchair  Vascular Access: Aseptic prep done for both on/off.  Report received from:Sania Mendoza R.N.  Report given to:Sania Mendoza R.N,  HEPATITIS B SURFACE ANTIGEN neg DATE10/24/17 HEPATITIS B SURFACE ANTIBODY Immune DATE 1/18/17  Chlorine/Chloramine water system checked every 4 hours.  Outpatient Dialysis at [facility]

## 2017-11-23 NOTE — PLAN OF CARE
Patient had dialysis this am and 2 units of RBC infused. VSS Tele SR. Crackles in bilat base. L arm fistula with bruit. Weaning O2 to 3liters following dialysis. Up with SBA.

## 2017-11-23 NOTE — PLAN OF CARE
Problem: Patient Care Overview  Goal: Plan of Care/Patient Progress Review  Outcome: No Change  Orientation: Alert and oriented x4.   VSS. 90% on 4 LPM NC. T 101.3 PRN tylenol given/ice packs in use. Recheck 99.3.   IVF: SL  Tele: SR. HR 80s.   LS: Diminished w/exp wheezes.   GI: Passing gas. No BM. Denies N/V. Tolerating clear liquids.   :No urine output   Skin: bruising noted, Skin otherwise intact.   Activity: SBA to assist of 1 w/cane. Pt slept comfortably throughout shift.   Pain: No c/o pain. No PRN interventions utilized. Pt sleeping.   DC Plan: Continue with current cares.

## 2017-11-23 NOTE — PROGRESS NOTES
[facility]  Hospitalist Progress Note  Ezra Fortune MD 11/23/17    Reason for Stay (Diagnosis): pneumonia, anemia         Assessment and Plan:      Summary of Stay:   Donald Raza is a 67 year old male with pmhx of AIDs on HAART, ESRD on HD, HTN, CAD s/p stents, anemia, and thrombocytopenia who was admitted on 11/21/2017 with chest pain.  Found to have R sided pneumonia on CTPA and no PE.  Hg lower than baseline and down to 5.9 next day.  Received one unit RBCs for symptomatic anemia 11/22.  Is on a small amount of oxygen here via NC.  Procalcitonin elevated.  Started on ceftriaxone/azithromycin for pneumonia.  Serial troponin negative and doubt ischemic etiology for pain.  Developed fevers and hypoxia while here.  Low CD4 count so ID consulted and broadened abx to vanco/zosyn today along with azithromycin.  Nephrology consulted for HD last run 11/23.  Received additional 2 units RBCs on HD 11/23 (ok for non-irradiated products).      Problem List/Assessment and Plan:   Acute hypoxemic respiratory failure due to pneumonia, possible GNRs: R sided chest pain that is constant and some sob.  Denies cough, but is actively coughing when I am in the room.  No fever or leukocytosis.  His chest CT shows no PE, but does show a R sided infiltrate.  His procalcitonin is elevated at > 2.  Viral load suppressed.  Last CD4 was 149.  Is on dapsone hs for pcp prophylaxis presumably.  Is on 2.5L O2 but high O2 sats.  Initially on ctx/azithro however developed real fevers and hypoxia.  -ID consulted given low CD4 count and higher fevers  -now on vanco/zosyn/azithromycin    Acute on chronic anemia: Hg as low as 5.9 here.  Anemia of chronic disease with HG 8-9 baseline.  Has had transfusions before and for some reason they have been irradiated.  I cannot see explanation as to why nor based on his history an indication for this.  He has HIV that is under control and ESRD, neither of which would require irradiation.   Tolerates transfusion much better on HD. Received one unit RBCs 11/22.    -hg 6.7.  Nephrology recommend 2 units while on HD that was ordered.  Discussed with blood bank again after reading chart.  He does not have indication for irradiated products.  Reportedly this was in his chart because of multiple medications requiring irradiated products, none of which he is on and I see no hx of anything like this in his chart or through care everywhere.  The meds that were listed looked like treatment for leukemia/lymphoma including fludrabine which he is not on.  He can receive standard non-irradiated products    Chest pain with significant CAD hx: hx of multiple stents and angiogram 10/2017 for chest pain that showed no obvious targets then to explain symptoms.  Medical management.  R sided chest pain radiating to back now that is worse I think is from his pneumonia as this is the location on CT and crackles on exam.  Serial troponin all 0.02.    -pain management for now and defer further ischemic w/u as less likely cardiac    ESRD on HD: HD T,Th,Sat.  Last HD 11/23 full run.  L arm fistula.    -nephrology consulted  -routine HD while here    HTN: -150.  PTA regimen includes amlodipine 5mg daily, imdur 90mg daily, losartan 25mg daily, hydralazine prn.  -resumed pta regimen    IDDM type II: pta on lantus 55U daily am and 15 U lispro tid with meals.  He has not taken any insulin in a few days and fasting BG ~115 this am  -continue lower dose lantus at 20U for now  -continue lower dose 5U aspart with meals and medium dose SSI  -further adjustments as necessary     Thrombocytopenia: plts 115k.  Baseline.  Monitor.    AIDs: last CD4 149 with suppressed viral load.  On HAART and dapsone.  -resume pta HAART and dapsone    DVT Prophylaxis: Pneumatic Compression Devices  Code Status: Full Code  FEN: dialysis diet  Discharge Dispo: home  Estimated Disch Date / # of Days until Disch: likely at least 2-3 more days given now  having fevers and broad spectrum abx        Interval History (Subjective):      Febrile overnight and hypoxic.  He feels worse overall and more sob. R sided chest pain as before.  Tolerated transfusion yesterday.  Feels fatigued still.  Completed HD run today.                  Physical Exam:      Last Vital Signs:  /63 (BP Location: Right arm)  Pulse 131  Temp 98.6  F (37  C) (Oral)  Resp 15  Wt 90.7 kg (200 lb)  SpO2 97%  BMI 27.89 kg/m2    Intake/Output Summary (Last 24 hours) at 11/23/17 1411  Last data filed at 11/23/17 1300   Gross per 24 hour   Intake          1163.17 ml   Output             3500 ml   Net         -2336.83 ml     Constitutional: Awake, NAD  Eyes: sclera white   HEENT:  MMM  Respiratory: anteriorly clear.  No wheeze  Cardiovascular: RRR.  1/6 systolic murmur  GI: soft, non-tender, not distended, bowel sounds present  Skin: no rash   Musculoskeletal/extremities:  No edema.  Palpable thrill and audible bruit L arm  Neurologic: A&O  Psychiatric: calm, cooperative         Medications:      All current medications were reviewed with changes reflected in problem list.         Data:      All new lab and imaging data was reviewed.   Labs:    Recent Labs  Lab 11/23/17  0625 11/22/17  0547 11/21/17  1405    134 132*   POTASSIUM 4.6 4.3 4.1   CHLORIDE 96 96 95   CO2 26 29 29   ANIONGAP 11 9 8   * 163* 273*   BUN 58* 37* 25   CR 9.24* 6.80* 4.77*   GFRESTIMATED 6* 8* 12*   GFRESTBLACK 7* 10* 15*   PRABHA 8.8 8.8 8.8       Recent Labs  Lab 11/23/17  0625 11/22/17 1958 11/22/17  0547 11/21/17  1405   WBC 6.8  --  4.6 6.1   HGB 6.7* 7.6* 5.9* 6.8*   HCT 21.7*  --  19.2* 21.5*   MCV 93  --  94 92   *  --  112* 144*       Recent Labs  Lab 11/23/17  0250 11/22/17  2215 11/22/17  1900   TROPI 0.027 0.016 <0.015      Imaging:   None today      Ezra Fortune MD

## 2017-11-23 NOTE — PROGRESS NOTES
[facility]     Renal Progress Note       SHORTHAND KEY FOR MY NOTES:  c = with, s = without, p = after, a = before, x = except, asx = asymptomatic, tx = transplant or treatment, sx = symptoms or symptomatic, cx = canceled or culture, rxn = reaction, yday = yesterday, nl = normal, abx = antibiotics, fxn = function, dx = diagnosis, dz = disease, m/h = melena/hematochezia, c/d/l/ha = cramping/dizziness/lightheadedness/headache, d/c = discharge or diarrhea/constipation, f/c/n/v = fevers/chills/nausea/vomiting, cp/sob = chest pain/shortness of breath.         Assessment/Plan:     1.  ESKD. Pt dialyses per TRS schedule.  Unremarkable run today.  A.  Next run on Sat.    2.  Anemia.  Pt is not having any GIB sx.  He does seem like he's sick, though, which can cause bone marrow suppression.  He's had a need for blood in the past s an obv source found.  A.  Follow labs, sx.  B.  Will give EPO 15,000 HD.    3.  Fevers/chills.  Pt is being seen by Dr. Wooten given his chronically low CD4 count.  Abx have been broadened.   A.  Follow clinically.          Interval History:     Pt has been febrile to 101+F and was sob overnight.  He is sleeping right now.  HD was fine today.          Medications and Allergies:       piperacillin-tazobactam  2.25 g Intravenous Q8H JAN     vancomycin place koenig - receiving intermittent dosing  1 each Does not apply See Admin Instructions     insulin aspart  5 Units Subcutaneous QAM AC     insulin aspart  1-7 Units Subcutaneous TID AC     insulin aspart  1-5 Units Subcutaneous At Bedtime     - MEDICATION INSTRUCTIONS for Dialysis Patients -   Does not apply See Admin Instructions     insulin glargine  20 Units Subcutaneous QAM AC     dapsone  100 mg Oral At Bedtime     ipratropium - albuterol 0.5 mg/2.5 mg/3 mL  3 mL Nebulization 4x daily     abacavir  600 mg Oral QPM     amLODIPine (NORVASC) tablet 5 mg  5 mg Oral Daily     atorvastatin  40 mg Oral At Bedtime     calcium carbonate   1,500 mg Oral TID     clopidogrel (PLAVIX) tablet 75 mg  75 mg Oral QPM     darunavir  800 mg Oral At Bedtime     dolutegravir  50 mg Oral At Bedtime     gabapentin  300 mg Oral QAM     gabapentin  600 mg Oral QPM     isosorbide mononitrate  90 mg Oral QPM     losartan (COZAAR) tablet 25 mg  25 mg Oral QPM     magnesium oxide (MAG-OX) tablet 400 mg  400 mg Oral At Bedtime     omeprazole  40 mg Oral Daily     ritonavir  100 mg Oral At Bedtime     azithromycin  250 mg Oral Daily     sodium chloride (PF)  3 mL Intracatheter Q8H      Allergies   Allergen Reactions     Calcium Acetate Other (See Comments)     Other reaction(s): Other, see comments  Pain in chest and back  Pain in chest area (sensitive skin)      Diagnostic X-Ray Materials Other (See Comments)     PN: renal failure     Lisinopril      Sulfa Drugs           Physical Exam:     Vitals were reviewed    Heart Rate: 76, Blood pressure 140/63, pulse 131, temperature 98.6  F (37  C), temperature source Oral, resp. rate 15, weight 90.7 kg (200 lb), SpO2 94 %.  Wt Readings from Last 3 Encounters:   11/23/17 90.7 kg (200 lb)   10/12/17 89.8 kg (197 lb 15.6 oz)   08/11/17 89.8 kg (198 lb)     Intake/Output Summary (Last 24 hours) at 11/23/17 1504  Last data filed at 11/23/17 1300   Gross per 24 hour   Intake          1163.17 ml   Output             3500 ml   Net         -2336.83 ml     GENERAL APPEARANCE:  NAD, sleeping peacefully  HEENT:  Eyes/ears/nose/neck grossly normal  RESP: lungs cta b c good efforts  CV: RRR c 2/6m, nl S1/S2   ABDOMEN: o/s/nt/nd  EXTREMITIES/SKIN: no rashes/lesions on observed skin; no significant ble edema  OTHER:  + LUAF c good thrill/bruit         Data:     CBC RESULTS:     Recent Labs  Lab 11/23/17  0625 11/22/17  1958 11/22/17  0547 11/21/17  1405   WBC 6.8  --  4.6 6.1   RBC 2.33*  --  2.05* 2.35*   HGB 6.7* 7.6* 5.9* 6.8*   HCT 21.7*  --  19.2* 21.5*   *  --  112* 144*     Basic Metabolic Panel:    Recent Labs  Lab  11/23/17  0625 11/22/17  0547 11/21/17  1405    134 132*   POTASSIUM 4.6 4.3 4.1   CHLORIDE 96 96 95   CO2 26 29 29   BUN 58* 37* 25   CR 9.24* 6.80* 4.77*   * 163* 273*   PRABHA 8.8 8.8 8.8     INR  Recent Labs  Lab 11/21/17  1405   INR 0.97      Attestation:   I have reviewed today's relevant vital signs, notes, medications, labs and imaging.    Bipin Chaves MD  [facility] - Nephrology  [phone]

## 2017-11-23 NOTE — PHARMACY-VANCOMYCIN DOSING SERVICE
Pharmacy Vancomycin Initial Note  Date of Service 2017  Patient's  1950  67 year old, male    Indication: PNA (ID following).    Current estimated CrCl = Estimated Creatinine Clearance: 8.4 mL/min (based on Cr of 9.24).    Creatinine for last 3 days  2017:  2:05 PM Creatinine 4.77 mg/dL  2017:  5:47 AM Creatinine 6.80 mg/dL  2017:  6:25 AM Creatinine 9.24 mg/dL    Recent Vancomycin Level(s) for last 3 days  No results found for requested labs within last 72 hours.      Vancomycin IV Administrations (past 72 hours)      No vancomycin orders with administrations in past 72 hours.                Nephrotoxins and other renal medications (Future)    Start     Dose/Rate Route Frequency Ordered Stop    17 1400  piperacillin-tazobactam (ZOSYN) infusion 2.25 g     Comments:  Pharmacy can adjust dose based on renal function.    2.25 g  100 mL/hr over 30 Minutes Intravenous EVERY 8 HOURS SCHEDULED 17 0941      17 1045  vancomycin (VANCOCIN) 2,000 mg in NaCl 0.9 % 500 mL intermittent infusion      2,000 mg  over 2 Hours Intravenous ONCE 17 1031      17 1031  vancomycin place koenig - receiving intermittent dosing      1 each Does not apply SEE ADMIN INSTRUCTIONS 17 1031            Contrast Orders - past 72 hours (72h ago through future)    Start     Dose/Rate Route Frequency Ordered Stop    17 1747  iopamidol (ISOVUE-370) solution 500 mL      500 mL Intravenous ONCE 17 1746 17 1747    17 1657  iopamidol (ISOVUE-370) solution 500 mL  Status:  Discontinued      500 mL Intravenous ONCE 17 1656 17 1704                Plan:  1.  Start vancomycin  2000 mg IV x1 (intermittent dosing due to ESRD).  2.  Goal Trough Level: 15-20 mg/L.  3.  Pharmacy will check levels as appropriate in 1-3 Days.    4. Jackhorn method utilized to dose vancomycin therapy: Method 1.    Adriana Tolbert

## 2017-11-24 LAB
ANION GAP SERPL CALCULATED.3IONS-SCNC: 9 MMOL/L (ref 3–14)
BUN SERPL-MCNC: 40 MG/DL (ref 7–30)
CALCIUM SERPL-MCNC: 8.8 MG/DL (ref 8.5–10.1)
CHLORIDE SERPL-SCNC: 98 MMOL/L (ref 94–109)
CO2 SERPL-SCNC: 26 MMOL/L (ref 20–32)
CREAT SERPL-MCNC: 7.36 MG/DL (ref 0.66–1.25)
ERYTHROCYTE [DISTWIDTH] IN BLOOD BY AUTOMATED COUNT: 15.4 % (ref 10–15)
GFR SERPL CREATININE-BSD FRML MDRD: 7 ML/MIN/1.7M2
GLUCOSE BLDC GLUCOMTR-MCNC: 161 MG/DL (ref 70–99)
GLUCOSE BLDC GLUCOMTR-MCNC: 186 MG/DL (ref 70–99)
GLUCOSE BLDC GLUCOMTR-MCNC: 226 MG/DL (ref 70–99)
GLUCOSE BLDC GLUCOMTR-MCNC: 243 MG/DL (ref 70–99)
GLUCOSE BLDC GLUCOMTR-MCNC: 369 MG/DL (ref 70–99)
GLUCOSE SERPL-MCNC: 165 MG/DL (ref 70–99)
HCT VFR BLD AUTO: 25.8 % (ref 40–53)
HGB BLD-MCNC: 8.2 G/DL (ref 13.3–17.7)
MCH RBC QN AUTO: 29.5 PG (ref 26.5–33)
MCHC RBC AUTO-ENTMCNC: 31.8 G/DL (ref 31.5–36.5)
MCV RBC AUTO: 93 FL (ref 78–100)
PLATELET # BLD AUTO: 107 10E9/L (ref 150–450)
POTASSIUM SERPL-SCNC: 4.2 MMOL/L (ref 3.4–5.3)
RBC # BLD AUTO: 2.78 10E12/L (ref 4.4–5.9)
SODIUM SERPL-SCNC: 133 MMOL/L (ref 133–144)
WBC # BLD AUTO: 6.2 10E9/L (ref 4–11)

## 2017-11-24 PROCEDURE — 85027 COMPLETE CBC AUTOMATED: CPT | Performed by: INTERNAL MEDICINE

## 2017-11-24 PROCEDURE — 25000132 ZZH RX MED GY IP 250 OP 250 PS 637: Mod: GY | Performed by: INTERNAL MEDICINE

## 2017-11-24 PROCEDURE — 12000007 ZZH R&B INTERMEDIATE

## 2017-11-24 PROCEDURE — 94640 AIRWAY INHALATION TREATMENT: CPT | Mod: 76

## 2017-11-24 PROCEDURE — 99233 SBSQ HOSP IP/OBS HIGH 50: CPT | Performed by: INTERNAL MEDICINE

## 2017-11-24 PROCEDURE — 80048 BASIC METABOLIC PNL TOTAL CA: CPT | Performed by: INTERNAL MEDICINE

## 2017-11-24 PROCEDURE — A9270 NON-COVERED ITEM OR SERVICE: HCPCS | Mod: GY | Performed by: INTERNAL MEDICINE

## 2017-11-24 PROCEDURE — 00000146 ZZHCL STATISTIC GLUCOSE BY METER IP

## 2017-11-24 PROCEDURE — 36415 COLL VENOUS BLD VENIPUNCTURE: CPT | Performed by: INTERNAL MEDICINE

## 2017-11-24 PROCEDURE — 94640 AIRWAY INHALATION TREATMENT: CPT

## 2017-11-24 PROCEDURE — 25000128 H RX IP 250 OP 636: Performed by: INTERNAL MEDICINE

## 2017-11-24 PROCEDURE — 40000275 ZZH STATISTIC RCP TIME EA 10 MIN

## 2017-11-24 PROCEDURE — 25000131 ZZH RX MED GY IP 250 OP 636 PS 637: Mod: GY | Performed by: INTERNAL MEDICINE

## 2017-11-24 PROCEDURE — 25000125 ZZHC RX 250: Performed by: HOSPITALIST

## 2017-11-24 PROCEDURE — 25000125 ZZHC RX 250: Performed by: INTERNAL MEDICINE

## 2017-11-24 RX ORDER — BISACODYL 10 MG
10 SUPPOSITORY, RECTAL RECTAL DAILY PRN
Status: DISCONTINUED | OUTPATIENT
Start: 2017-11-24 | End: 2017-11-28 | Stop reason: HOSPADM

## 2017-11-24 RX ORDER — SENNOSIDES 8.6 MG
1-2 TABLET ORAL DAILY PRN
Status: DISCONTINUED | OUTPATIENT
Start: 2017-11-24 | End: 2017-11-28 | Stop reason: HOSPADM

## 2017-11-24 RX ADMIN — ABACAVIR 600 MG: 300 TABLET, FILM COATED ORAL at 20:14

## 2017-11-24 RX ADMIN — CALCIUM CARBONATE (ANTACID) CHEW TAB 500 MG 1500 MG: 500 CHEW TAB at 16:19

## 2017-11-24 RX ADMIN — DARUNAVIR 800 MG: 800 TABLET, FILM COATED ORAL at 21:38

## 2017-11-24 RX ADMIN — CLOPIDOGREL 75 MG: 75 TABLET, FILM COATED ORAL at 20:13

## 2017-11-24 RX ADMIN — IPRATROPIUM BROMIDE AND ALBUTEROL SULFATE 3 ML: .5; 3 SOLUTION RESPIRATORY (INHALATION) at 08:07

## 2017-11-24 RX ADMIN — CALCIUM CARBONATE (ANTACID) CHEW TAB 500 MG 1500 MG: 500 CHEW TAB at 08:43

## 2017-11-24 RX ADMIN — IPRATROPIUM BROMIDE AND ALBUTEROL SULFATE 3 ML: .5; 3 SOLUTION RESPIRATORY (INHALATION) at 12:05

## 2017-11-24 RX ADMIN — TAZOBACTAM SODIUM AND PIPERACILLIN SODIUM 2.25 G: 250; 2 INJECTION, SOLUTION INTRAVENOUS at 02:14

## 2017-11-24 RX ADMIN — INSULIN ASPART 1 UNITS: 100 INJECTION, SOLUTION INTRAVENOUS; SUBCUTANEOUS at 07:26

## 2017-11-24 RX ADMIN — ISOSORBIDE MONONITRATE 90 MG: 60 TABLET, EXTENDED RELEASE ORAL at 20:13

## 2017-11-24 RX ADMIN — GABAPENTIN 300 MG: 300 CAPSULE ORAL at 08:43

## 2017-11-24 RX ADMIN — AMLODIPINE BESYLATE 5 MG: 5 TABLET ORAL at 08:43

## 2017-11-24 RX ADMIN — GABAPENTIN 600 MG: 300 CAPSULE ORAL at 20:13

## 2017-11-24 RX ADMIN — RITONAVIR 100 MG: 100 TABLET, FILM COATED ORAL at 21:38

## 2017-11-24 RX ADMIN — DOLUTEGRAVIR SODIUM 50 MG: 50 TABLET, FILM COATED ORAL at 21:38

## 2017-11-24 RX ADMIN — INSULIN ASPART 2 UNITS: 100 INJECTION, SOLUTION INTRAVENOUS; SUBCUTANEOUS at 12:30

## 2017-11-24 RX ADMIN — MAGNESIUM OXIDE TAB 400 MG (241.3 MG ELEMENTAL MG) 400 MG: 400 (241.3 MG) TAB at 21:38

## 2017-11-24 RX ADMIN — DAPSONE 100 MG: 100 TABLET ORAL at 21:38

## 2017-11-24 RX ADMIN — IPRATROPIUM BROMIDE AND ALBUTEROL SULFATE 3 ML: .5; 3 SOLUTION RESPIRATORY (INHALATION) at 19:38

## 2017-11-24 RX ADMIN — ATORVASTATIN CALCIUM 40 MG: 40 TABLET, FILM COATED ORAL at 21:38

## 2017-11-24 RX ADMIN — CALCIUM CARBONATE (ANTACID) CHEW TAB 500 MG 1500 MG: 500 CHEW TAB at 21:38

## 2017-11-24 RX ADMIN — OMEPRAZOLE 40 MG: 20 CAPSULE, DELAYED RELEASE ORAL at 16:19

## 2017-11-24 RX ADMIN — IPRATROPIUM BROMIDE AND ALBUTEROL SULFATE 3 ML: .5; 3 SOLUTION RESPIRATORY (INHALATION) at 15:56

## 2017-11-24 RX ADMIN — AZITHROMYCIN 250 MG: 250 TABLET, FILM COATED ORAL at 16:19

## 2017-11-24 RX ADMIN — LOSARTAN POTASSIUM 25 MG: 25 TABLET, FILM COATED ORAL at 20:13

## 2017-11-24 RX ADMIN — ALBUTEROL SULFATE 2.5 MG: 2.5 SOLUTION RESPIRATORY (INHALATION) at 00:40

## 2017-11-24 RX ADMIN — INSULIN GLARGINE 20 UNITS: 100 INJECTION, SOLUTION SUBCUTANEOUS at 07:24

## 2017-11-24 RX ADMIN — INSULIN ASPART 3 UNITS: 100 INJECTION, SOLUTION INTRAVENOUS; SUBCUTANEOUS at 17:14

## 2017-11-24 RX ADMIN — TAZOBACTAM SODIUM AND PIPERACILLIN SODIUM 2.25 G: 250; 2 INJECTION, SOLUTION INTRAVENOUS at 10:16

## 2017-11-24 RX ADMIN — TAZOBACTAM SODIUM AND PIPERACILLIN SODIUM 2.25 G: 250; 2 INJECTION, SOLUTION INTRAVENOUS at 18:26

## 2017-11-24 ASSESSMENT — ACTIVITIES OF DAILY LIVING (ADL)
ADLS_ACUITY_SCORE: 9

## 2017-11-24 NOTE — PLAN OF CARE
Problem: Patient Care Overview  Goal: Plan of Care/Patient Progress Review  Outcome: No Change  Pt. AxOx3, up with assist of 1, Tele: SR, BGL at 4799=799. O2 at the start of the shift down to 88% on 3L, O2 increased to 4L and had pt. Use I.S. And increased sat's up to 90-93%. Neb given. Lung sounds coarse, Frequent productive cough. Pt. Was able to void 150 ml tonight. Fistula to left arm with positive bruit and thrill. Plan: Vanco, Zosyn, Azithromycin for PNA, Next Dialysis run on Saturday, Pt. Denies any needs at this time. Will continue with POC.

## 2017-11-24 NOTE — PROGRESS NOTES
[facility]  Infectious Disease Progress Note          Assessment and Plan:   IMPRESSION:   1.  A 67-year-old male very well-known to me through multiple hospitalizations, admitted with acute respiratory symptoms, found to have a degree of infiltrate, probably conventional pneumonia.  In the past he has had multiple episodes similar to this generally responding to conventional antibiotics, no positive cultures to date, but clinical worsening on ceftriaxone and Zithromax.  I think that is more likely due to a more resistant subhash of bacteria, possibly health care-associated than due to opportunistic infection.   2.  History of longstanding HIV infection with T-cells generally in the 140-200 range, most recently 149, has been on chronic prophylactic dapsone, relatively unlikely to have opportunistic infection despite the low T-cells.   3.  End-stage renal disease on dialysis, thus frequent Health Care contact.   4.  Multiple respiratory infections in the past, severe influenza earlier this year, prior to that at least one intubated severe pneumonia, some perception of possible aspiration or chronic lung disease in the past, but neither formally diagnosed.   5.  History of coronary artery disease.   6.  Sulfa allergy.       RECOMMENDATIONS:   1.  Complete Z-HELEN for atypicals.   2.   vancomycin and Zosyn, the vancomycin of course easily accomplished here on dialysis, relatively unlikely to have MRSA, has never been colonized before, but should cover for now given the ease of coverage and significant amt of HC contact.   3.  If not clinically responding we will attempt to get further diagnostic studies, possibly even bronchoscopy. but I think for now simply treating is acceptable.   4.  Continue HIV medications and dapsone on same regimen that has been successful at controlling virus previously.  5 call if issues this WE             Interval History:   no new complaints and doing better O2 OK on  3-4 L no cp, sob, n/v/d, or abd pain. Still some LGF, no new focal sxs, cxs neg              Medications:       piperacillin-tazobactam  2.25 g Intravenous Q8H JAN     vancomycin place koenig - receiving intermittent dosing  1 each Does not apply See Admin Instructions     insulin aspart  5 Units Subcutaneous QAM AC     insulin aspart  1-7 Units Subcutaneous TID AC     insulin aspart  1-5 Units Subcutaneous At Bedtime     - MEDICATION INSTRUCTIONS for Dialysis Patients -   Does not apply See Admin Instructions     insulin glargine  20 Units Subcutaneous QAM AC     dapsone  100 mg Oral At Bedtime     ipratropium - albuterol 0.5 mg/2.5 mg/3 mL  3 mL Nebulization 4x daily     abacavir  600 mg Oral QPM     amLODIPine (NORVASC) tablet 5 mg  5 mg Oral Daily     atorvastatin  40 mg Oral At Bedtime     calcium carbonate  1,500 mg Oral TID     clopidogrel (PLAVIX) tablet 75 mg  75 mg Oral QPM     darunavir  800 mg Oral At Bedtime     dolutegravir  50 mg Oral At Bedtime     gabapentin  300 mg Oral QAM     gabapentin  600 mg Oral QPM     isosorbide mononitrate  90 mg Oral QPM     losartan (COZAAR) tablet 25 mg  25 mg Oral QPM     magnesium oxide (MAG-OX) tablet 400 mg  400 mg Oral At Bedtime     omeprazole  40 mg Oral Daily     ritonavir  100 mg Oral At Bedtime     azithromycin  250 mg Oral Daily     sodium chloride (PF)  3 mL Intracatheter Q8H                  Physical Exam:   Blood pressure 146/64, pulse 131, temperature 98.5  F (36.9  C), temperature source Oral, resp. rate 18, weight 88.7 kg (195 lb 8 oz), SpO2 94 %.  Wt Readings from Last 2 Encounters:   11/24/17 88.7 kg (195 lb 8 oz)   10/12/17 89.8 kg (197 lb 15.6 oz)     Vital Signs with Ranges  Temp:  [98.3  F (36.8  C)-101.1  F (38.4  C)] 98.5  F (36.9  C)  Heart Rate:  [68-88] 77  Resp:  [15-20] 18  BP: (111-147)/(48-78) 146/64  SpO2:  [88 %-98 %] 94 %    Constitutional: Awake, alert, cooperative, no apparent distress looks Ok on O2   Lungs: Clear to auscultation  bilaterally, few crackles R sl  wheezing   Cardiovascular: Regular rate and rhythm, normal S1 and S2, and no murmur noted   Abdomen: Normal bowel sounds, soft, non-distended, non-tender   Skin: No rashes, no cyanosis, same edema   Other:           Data:   All microbiology laboratory data reviewed.  Recent Labs   Lab Test  11/24/17 0628 11/23/17 0625 11/22/17 1958 11/22/17 0547   WBC  6.2  6.8   --   4.6   HGB  8.2*  6.7*  7.6*  5.9*   HCT  25.8*  21.7*   --   19.2*   MCV  93  93   --   94   PLT  107*  113*   --   112*     Recent Labs   Lab Test  11/24/17 0628 11/23/17 0625 11/22/17   0547   CR  7.36*  9.24*  6.80*     No lab results found.  Recent Labs   Lab Test  11/22/17 2000 11/22/17 1958 11/21/17   1921  11/21/17   1405  03/30/17   0800  03/29/17   0131  03/28/17   2044  03/28/17 2015 03/28/17 1953   CULT  No growth after 2 days  No growth after 2 days  No growth after 3 days  No growth after 3 days  Light growth Normal subhash  Canceled, Test credited >10 Squamous epithelial cells/low power field indicates   oral contamination. Please recollect. Notification of test cancellation was   given to Jennifer Pacheco RN [id number] 03.29.17 CF  *  10,000 to 50,000 colonies/mL mixed urogenital subhash  Susceptibility testing not routinely done    No growth  No growth

## 2017-11-24 NOTE — PLAN OF CARE
Problem: Patient Care Overview  Goal: Plan of Care/Patient Progress Review  Outcome: No Change  Vss, no co pain/cp/sob.   Tele SR.  Up with Ax1, up in chair this shift, encouraged IS, HD in am, IVF continue.  , 226.  Dc in 2-3 days.  Continue poc and monitoring.

## 2017-11-24 NOTE — PROGRESS NOTES
[facility]  Hospitalist Progress Note  Sherice Bernardo MD 11/24/2017    Reason for Stay (Diagnosis): pneumonia, anemia         Assessment and Plan:      Summary of Stay:   Donald Raza is a 67 year old male with pmhx of AIDs on HAART, ESRD on HD, HTN, CAD s/p stents, anemia, and thrombocytopenia who was admitted on 11/21/2017 with chest pain.  Found to have R sided pneumonia on CTPA and no PE.  Hgb was lower than baseline and down to 5.9 next day.  Received one unit RBCs for symptomatic anemia on 11/22.  Is on a small amount of oxygen here via NC.  Procalcitonin elevated.  Started on ceftriaxone/azithromycin for pneumonia.  Serial troponin negative and doubt ischemic etiology for pain.  Developed fevers and hypoxia while here.  Low CD4 count so ID consulted and broadened abx to vanco/zosyn on 11/23 along with azithromycin.  Nephrology consulted for HD last run 11/23.  Received additional 2 units RBCs on HD 11/23 (ok for non-irradiated products).      Problem List/Assessment and Plan:   Acute hypoxemic respiratory failure due to pneumonia, possible GNRs:  -- Presented with R sided chest pain and some sob. No fever initially or leukocytosis.    --Chest CT shows no PE, but does show a R sided infiltrate.    --Procalcitonin is elevated at > 2.  Viral load suppressed.  Last CD4 was 149.    --Is on dapsone hs for pcp prophylaxis presumably.    --Is requiring  2.5L O2 but high O2 sats.    --Initially on ctx/azithro but with  developed  fevers and hypoxia and ABx were broadened to Vanco, zosyn and Azithro  --ID consulted and following given low CD4 count and higher fevers      Acute on chronic anemia:   -- presented with Hgb as low as 5.9 here.    -- has h/o Anemia of chronic disease with HG 8-9 baseline.  -- received 3 units PRBCs in hospital : 1 on 11/22, 2 units with HD on 11/23)   --hgb 8.2.      Chest pain with significant CAD hx:   --hx of multiple stents and angiogram 10/2017 for chest pain that  showed no obvious targets then to explain symptoms.    --R sided chest pain radiating to back likely  from his pneumonia  --Serial troponin all 0.02.    --pain management for now and defer further ischemic w/u as less likely cardiac    ESRD on HD: HD T,Th,Sat.  Last HD 11/23 full run.  L arm fistula.    --nephrology consulted  --routine HD while here    HTN: -150.  PTA regimen includes amlodipine 5mg daily, imdur 90mg daily, losartan 25mg daily, hydralazine prn.  -continued pta regimen    IDDM type II: pta on lantus 55U daily am and 15 U lispro tid with meals.  He has not taken any insulin in a few days and fasting BG ~115 this am  -INCREASE  dose lantus at 25U   -continue lower dose 5U aspart with meals and medium dose SSI  -further adjustments as necessary     Thrombocytopenia: plts 115k.  Baseline.  Monitor.    AIDs: last CD4 149 with suppressed viral load.  On HAART and dapsone.  -resume pta HAART and dapsone    DVT Prophylaxis: Pneumatic Compression Devices  Code Status: Full Code  FEN: dialysis diet  Discharge Dispo: home  Estimated Disch Date / # of Days until Disch: likely at least 2-3 more days needs broad spectrum abx        Interval History (Subjective):      Assumed care reviewed chart Still hypoxic, lethargic and weak . R sided chest pain as before. Review of all other systems negative                   Physical Exam:      Last Vital Signs:  /64 (BP Location: Right arm)  Pulse 131  Temp 98.5  F (36.9  C) (Oral)  Resp 18  Wt 88.7 kg (195 lb 8 oz)  SpO2 94%  BMI 27.27 kg/m2    Intake/Output Summary (Last 24 hours) at 11/23/17 1411  Last data filed at 11/23/17 1300   Gross per 24 hour   Intake          1163.17 ml   Output             3500 ml   Net         -2336.83 ml     Constitutional: Awake, NAD  Eyes: sclera white   HEENT:  MMM  Respiratory: anteriorly clear.  No wheeze  Cardiovascular: RRR.  1/6 systolic murmur  GI: soft, non-tender, not distended, bowel sounds present  Skin: no rash    Musculoskeletal/extremities:  No edema.  Palpable thrill and audible bruit L arm  Neurologic: A&O  Psychiatric: calm, cooperative         Medications:      All current medications were reviewed with changes reflected in problem list.         Data:      All new lab and imaging data was reviewed.   Labs:    Recent Labs  Lab 11/24/17 0628 11/23/17 0625 11/22/17  0547    133 134   POTASSIUM 4.2 4.6 4.3   CHLORIDE 98 96 96   CO2 26 26 29   ANIONGAP 9 11 9   * 140* 163*   BUN 40* 58* 37*   CR 7.36* 9.24* 6.80*   GFRESTIMATED 7* 6* 8*   GFRESTBLACK 9* 7* 10*   PRABHA 8.8 8.8 8.8       Recent Labs  Lab 11/24/17 0628 11/23/17 0625 11/22/17 1958 11/22/17  0547   WBC 6.2 6.8  --  4.6   HGB 8.2* 6.7* 7.6* 5.9*   HCT 25.8* 21.7*  --  19.2*   MCV 93 93  --  94   * 113*  --  112*       Recent Labs  Lab 11/23/17  0250 11/22/17  2215 11/22/17  1900   TROPI 0.027 0.016 <0.015      Imaging:   None today      Sherice Bernardo MD

## 2017-11-24 NOTE — PLAN OF CARE
Problem: Patient Care Overview  Goal: Plan of Care/Patient Progress Review  Outcome: No Change  /64 (BP Location: Right arm)  Pulse 131  Temp 100.4  F (38  C) (Axillary)  Resp 20  Wt 90.7 kg (200 lb)  SpO2 92%  BMI 27.89 kg/m2  Neuro: A&O, sleepy but easy to arouse  Pain: denies pain this shift  Resp: LS diminished, crackles to bases, on 3L O2 via NC  Cardiac: WDL, tele: SR  GI/: bowel sounds positive, no urine output, on hemodialysis  Diet: tolerating renal diet  Skin/mobility: bruising to arms, fistula to L arm, up with SBA  Tx/plan:  Vanco/zosyn/azithromycin for PNA, HD, received 2 units in dialysis for anemia  Will continue to monitor and provide supportive care.

## 2017-11-24 NOTE — PROGRESS NOTES
[facility]     Renal Progress Note       SHORTHAND KEY FOR MY NOTES:  c = with, s = without, p = after, a = before, x = except, asx = asymptomatic, tx = transplant or treatment, sx = symptoms or symptomatic, cx = canceled or culture, rxn = reaction, yday = yesterday, nl = normal, abx = antibiotics, fxn = function, dx = diagnosis, dz = disease, m/h = melena/hematochezia, c/d/l/ha = cramping/dizziness/lightheadedness/headache, d/c = discharge or diarrhea/constipation, f/c/n/v = fevers/chills/nausea/vomiting, cp/sob = chest pain/shortness of breath.         Assessment/Plan:     1.  ESKD. Pt dialyses per TRS schedule.  HD tmrw.  A.  Orders written for tmrw's run.    2.  Anemia.  Pt's hb is in the 8s today p 2 units of blood yday.  No signs of bleeding.  No heparin on the run.    A.  Continue EPO 15k qHD.  B.  Follow hb, clinically.  C.  Transfuse prn hb < 7.  Best to do on HD-days, though can give on non-HD days, if needed.    3.  Fevers/chills.  Pt is on broad abx per Dr. Wooten.    A.  Follow clinically.  B.  Renal dosing of abx.        Interval History:     Pt continues to have fevers and is pretty tired.  He is still sick and looks it...not his norm.  He thought we may have taken off too much fluid on Thursday as he was a bit more tired post-run.  He didn't have any c/d/l, though.          Medications and Allergies:       piperacillin-tazobactam  2.25 g Intravenous Q8H JAN     vancomycin place koenig - receiving intermittent dosing  1 each Does not apply See Admin Instructions     insulin aspart  5 Units Subcutaneous QAM AC     insulin aspart  1-7 Units Subcutaneous TID AC     insulin aspart  1-5 Units Subcutaneous At Bedtime     - MEDICATION INSTRUCTIONS for Dialysis Patients -   Does not apply See Admin Instructions     insulin glargine  20 Units Subcutaneous QAM AC     dapsone  100 mg Oral At Bedtime     ipratropium - albuterol 0.5 mg/2.5 mg/3 mL  3 mL Nebulization 4x daily     abacavir  600 mg Oral  QPM     amLODIPine (NORVASC) tablet 5 mg  5 mg Oral Daily     atorvastatin  40 mg Oral At Bedtime     calcium carbonate  1,500 mg Oral TID     clopidogrel (PLAVIX) tablet 75 mg  75 mg Oral QPM     darunavir  800 mg Oral At Bedtime     dolutegravir  50 mg Oral At Bedtime     gabapentin  300 mg Oral QAM     gabapentin  600 mg Oral QPM     isosorbide mononitrate  90 mg Oral QPM     losartan (COZAAR) tablet 25 mg  25 mg Oral QPM     magnesium oxide (MAG-OX) tablet 400 mg  400 mg Oral At Bedtime     omeprazole  40 mg Oral Daily     ritonavir  100 mg Oral At Bedtime     azithromycin  250 mg Oral Daily     sodium chloride (PF)  3 mL Intracatheter Q8H      Allergies   Allergen Reactions     Calcium Acetate Other (See Comments)     Other reaction(s): Other, see comments  Pain in chest and back  Pain in chest area (sensitive skin)      Diagnostic X-Ray Materials Other (See Comments)     PN: renal failure     Lisinopril      Sulfa Drugs           Physical Exam:     Vitals were reviewed    Heart Rate: 77, Blood pressure 146/64, pulse 131, temperature 98.5  F (36.9  C), temperature source Oral, resp. rate 18, weight 88.7 kg (195 lb 8 oz), SpO2 94 %.  Wt Readings from Last 3 Encounters:   11/24/17 88.7 kg (195 lb 8 oz)   10/12/17 89.8 kg (197 lb 15.6 oz)   08/11/17 89.8 kg (198 lb)     Intake/Output Summary (Last 24 hours) at 11/24/17 1108  Last data filed at 11/24/17 0804   Gross per 24 hour   Intake             1020 ml   Output             3650 ml   Net            -2630 ml     GENERAL APPEARANCE:  sleeping, but arousable; looks sick/tired  HEENT:  Eyes/ears/nose/neck grossly normal x + nc o2  OTHER:  + LUAF c good thrill/bruit         Data:     CBC RESULTS:    Recent Labs  Lab 11/24/17  0628 11/23/17  0625 11/22/17 1958 11/22/17  0547 11/21/17  1405   WBC 6.2 6.8  --  4.6 6.1   RBC 2.78* 2.33*  --  2.05* 2.35*   HGB 8.2* 6.7* 7.6* 5.9* 6.8*   HCT 25.8* 21.7*  --  19.2* 21.5*   * 113*  --  112* 144*     Basic Metabolic  Panel:    Recent Labs  Lab 11/24/17  0628 11/23/17  0625 11/22/17  0547 11/21/17  1405    133 134 132*   POTASSIUM 4.2 4.6 4.3 4.1   CHLORIDE 98 96 96 95   CO2 26 26 29 29   BUN 40* 58* 37* 25   CR 7.36* 9.24* 6.80* 4.77*   * 140* 163* 273*   PRABHA 8.8 8.8 8.8 8.8     INR    Recent Labs  Lab 11/21/17  1405   INR 0.97      Attestation:   I have reviewed today's relevant vital signs, notes, medications, labs and imaging.    Bipin Chaves MD  [facility] - Nephrology  [phone]

## 2017-11-25 LAB
ANION GAP SERPL CALCULATED.3IONS-SCNC: 11 MMOL/L (ref 3–14)
BASOPHILS # BLD AUTO: 0 10E9/L (ref 0–0.2)
BASOPHILS NFR BLD AUTO: 0.6 %
BUN SERPL-MCNC: 61 MG/DL (ref 7–30)
CALCIUM SERPL-MCNC: 9.2 MG/DL (ref 8.5–10.1)
CHLORIDE SERPL-SCNC: 95 MMOL/L (ref 94–109)
CO2 SERPL-SCNC: 25 MMOL/L (ref 20–32)
CREAT SERPL-MCNC: 9.46 MG/DL (ref 0.66–1.25)
DIFFERENTIAL METHOD BLD: ABNORMAL
EOSINOPHIL # BLD AUTO: 0.2 10E9/L (ref 0–0.7)
EOSINOPHIL NFR BLD AUTO: 2.8 %
ERYTHROCYTE [DISTWIDTH] IN BLOOD BY AUTOMATED COUNT: 15.1 % (ref 10–15)
GFR SERPL CREATININE-BSD FRML MDRD: 6 ML/MIN/1.7M2
GLUCOSE BLDC GLUCOMTR-MCNC: 109 MG/DL (ref 70–99)
GLUCOSE BLDC GLUCOMTR-MCNC: 142 MG/DL (ref 70–99)
GLUCOSE BLDC GLUCOMTR-MCNC: 165 MG/DL (ref 70–99)
GLUCOSE BLDC GLUCOMTR-MCNC: 201 MG/DL (ref 70–99)
GLUCOSE BLDC GLUCOMTR-MCNC: 206 MG/DL (ref 70–99)
GLUCOSE SERPL-MCNC: 165 MG/DL (ref 70–99)
GRAM STN SPEC: ABNORMAL
HCT VFR BLD AUTO: 24.4 % (ref 40–53)
HGB BLD-MCNC: 7.9 G/DL (ref 13.3–17.7)
IMM GRANULOCYTES # BLD: 0 10E9/L (ref 0–0.4)
IMM GRANULOCYTES NFR BLD: 0.4 %
LYMPHOCYTES # BLD AUTO: 0.8 10E9/L (ref 0.8–5.3)
LYMPHOCYTES NFR BLD AUTO: 14.6 %
Lab: ABNORMAL
MCH RBC QN AUTO: 29.7 PG (ref 26.5–33)
MCHC RBC AUTO-ENTMCNC: 32.4 G/DL (ref 31.5–36.5)
MCV RBC AUTO: 92 FL (ref 78–100)
MONOCYTES # BLD AUTO: 0.4 10E9/L (ref 0–1.3)
MONOCYTES NFR BLD AUTO: 7.9 %
NEUTROPHILS # BLD AUTO: 3.9 10E9/L (ref 1.6–8.3)
NEUTROPHILS NFR BLD AUTO: 73.7 %
NRBC # BLD AUTO: 0 10*3/UL
NRBC BLD AUTO-RTO: 0 /100
PLATELET # BLD AUTO: 117 10E9/L (ref 150–450)
POTASSIUM SERPL-SCNC: 4.8 MMOL/L (ref 3.4–5.3)
RBC # BLD AUTO: 2.66 10E12/L (ref 4.4–5.9)
SODIUM SERPL-SCNC: 131 MMOL/L (ref 133–144)
SPECIMEN SOURCE: ABNORMAL
VANCOMYCIN SERPL-MCNC: 22.4 MG/L
WBC # BLD AUTO: 5.3 10E9/L (ref 4–11)

## 2017-11-25 PROCEDURE — 80202 ASSAY OF VANCOMYCIN: CPT | Performed by: INTERNAL MEDICINE

## 2017-11-25 PROCEDURE — 25000132 ZZH RX MED GY IP 250 OP 250 PS 637: Mod: GY | Performed by: INTERNAL MEDICINE

## 2017-11-25 PROCEDURE — 90937 HEMODIALYSIS REPEATED EVAL: CPT

## 2017-11-25 PROCEDURE — 94640 AIRWAY INHALATION TREATMENT: CPT | Mod: 76

## 2017-11-25 PROCEDURE — 25000128 H RX IP 250 OP 636: Performed by: INTERNAL MEDICINE

## 2017-11-25 PROCEDURE — 94640 AIRWAY INHALATION TREATMENT: CPT

## 2017-11-25 PROCEDURE — 63400005 ZZH RX 634: Performed by: INTERNAL MEDICINE

## 2017-11-25 PROCEDURE — 12000007 ZZH R&B INTERMEDIATE

## 2017-11-25 PROCEDURE — 87070 CULTURE OTHR SPECIMN AEROBIC: CPT | Performed by: INTERNAL MEDICINE

## 2017-11-25 PROCEDURE — A9270 NON-COVERED ITEM OR SERVICE: HCPCS | Mod: GY | Performed by: INTERNAL MEDICINE

## 2017-11-25 PROCEDURE — 85027 COMPLETE CBC AUTOMATED: CPT | Performed by: INTERNAL MEDICINE

## 2017-11-25 PROCEDURE — 99233 SBSQ HOSP IP/OBS HIGH 50: CPT | Performed by: INTERNAL MEDICINE

## 2017-11-25 PROCEDURE — 85025 COMPLETE CBC W/AUTO DIFF WBC: CPT | Performed by: INTERNAL MEDICINE

## 2017-11-25 PROCEDURE — 25000131 ZZH RX MED GY IP 250 OP 636 PS 637: Mod: GY | Performed by: INTERNAL MEDICINE

## 2017-11-25 PROCEDURE — 00000146 ZZHCL STATISTIC GLUCOSE BY METER IP

## 2017-11-25 PROCEDURE — 36415 COLL VENOUS BLD VENIPUNCTURE: CPT | Performed by: INTERNAL MEDICINE

## 2017-11-25 PROCEDURE — 87205 SMEAR GRAM STAIN: CPT | Performed by: INTERNAL MEDICINE

## 2017-11-25 PROCEDURE — 80048 BASIC METABOLIC PNL TOTAL CA: CPT | Performed by: INTERNAL MEDICINE

## 2017-11-25 PROCEDURE — 40000275 ZZH STATISTIC RCP TIME EA 10 MIN

## 2017-11-25 PROCEDURE — 25000125 ZZHC RX 250: Performed by: HOSPITALIST

## 2017-11-25 RX ORDER — VANCOMYCIN HYDROCHLORIDE 1 G/200ML
1000 INJECTION, SOLUTION INTRAVENOUS ONCE
Status: COMPLETED | OUTPATIENT
Start: 2017-11-25 | End: 2017-11-25

## 2017-11-25 RX ORDER — ALBUMIN, HUMAN INJ 5% 5 %
250 SOLUTION INTRAVENOUS
Status: DISCONTINUED | OUTPATIENT
Start: 2017-11-25 | End: 2017-11-25

## 2017-11-25 RX ORDER — ALBUMIN (HUMAN) 12.5 G/50ML
50 SOLUTION INTRAVENOUS
Status: DISCONTINUED | OUTPATIENT
Start: 2017-11-25 | End: 2017-11-25

## 2017-11-25 RX ADMIN — DOLUTEGRAVIR SODIUM 50 MG: 50 TABLET, FILM COATED ORAL at 21:23

## 2017-11-25 RX ADMIN — RITONAVIR 100 MG: 100 TABLET, FILM COATED ORAL at 21:23

## 2017-11-25 RX ADMIN — IPRATROPIUM BROMIDE AND ALBUTEROL SULFATE 3 ML: .5; 3 SOLUTION RESPIRATORY (INHALATION) at 15:27

## 2017-11-25 RX ADMIN — GUAIFENESIN 10 ML: 100 SOLUTION ORAL at 00:04

## 2017-11-25 RX ADMIN — IPRATROPIUM BROMIDE AND ALBUTEROL SULFATE 3 ML: .5; 3 SOLUTION RESPIRATORY (INHALATION) at 21:02

## 2017-11-25 RX ADMIN — ATORVASTATIN CALCIUM 40 MG: 40 TABLET, FILM COATED ORAL at 21:23

## 2017-11-25 RX ADMIN — INSULIN GLARGINE 25 UNITS: 100 INJECTION, SOLUTION SUBCUTANEOUS at 09:49

## 2017-11-25 RX ADMIN — VANCOMYCIN HYDROCHLORIDE 1000 MG: 1 INJECTION, SOLUTION INTRAVENOUS at 13:14

## 2017-11-25 RX ADMIN — OMEPRAZOLE 40 MG: 20 CAPSULE, DELAYED RELEASE ORAL at 16:03

## 2017-11-25 RX ADMIN — GABAPENTIN 600 MG: 300 CAPSULE ORAL at 19:41

## 2017-11-25 RX ADMIN — ERYTHROPOIETIN 15000 UNITS: 10000 INJECTION, SOLUTION INTRAVENOUS; SUBCUTANEOUS at 10:13

## 2017-11-25 RX ADMIN — CALCIUM CARBONATE (ANTACID) CHEW TAB 500 MG 1500 MG: 500 CHEW TAB at 12:20

## 2017-11-25 RX ADMIN — CLOPIDOGREL 75 MG: 75 TABLET, FILM COATED ORAL at 19:41

## 2017-11-25 RX ADMIN — MAGNESIUM OXIDE TAB 400 MG (241.3 MG ELEMENTAL MG) 400 MG: 400 (241.3 MG) TAB at 21:23

## 2017-11-25 RX ADMIN — IPRATROPIUM BROMIDE AND ALBUTEROL SULFATE 3 ML: .5; 3 SOLUTION RESPIRATORY (INHALATION) at 07:19

## 2017-11-25 RX ADMIN — DARUNAVIR 800 MG: 800 TABLET, FILM COATED ORAL at 21:23

## 2017-11-25 RX ADMIN — INSULIN ASPART 2 UNITS: 100 INJECTION, SOLUTION INTRAVENOUS; SUBCUTANEOUS at 17:48

## 2017-11-25 RX ADMIN — TAZOBACTAM SODIUM AND PIPERACILLIN SODIUM 2.25 G: 250; 2 INJECTION, SOLUTION INTRAVENOUS at 12:25

## 2017-11-25 RX ADMIN — TAZOBACTAM SODIUM AND PIPERACILLIN SODIUM 2.25 G: 250; 2 INJECTION, SOLUTION INTRAVENOUS at 03:27

## 2017-11-25 RX ADMIN — TAZOBACTAM SODIUM AND PIPERACILLIN SODIUM 2.25 G: 250; 2 INJECTION, SOLUTION INTRAVENOUS at 18:06

## 2017-11-25 RX ADMIN — GABAPENTIN 300 MG: 300 CAPSULE ORAL at 12:20

## 2017-11-25 RX ADMIN — SENNOSIDES 2 TABLET: 8.6 TABLET, FILM COATED ORAL at 00:04

## 2017-11-25 RX ADMIN — ISOSORBIDE MONONITRATE 90 MG: 60 TABLET, EXTENDED RELEASE ORAL at 19:48

## 2017-11-25 RX ADMIN — AMLODIPINE BESYLATE 5 MG: 5 TABLET ORAL at 12:24

## 2017-11-25 RX ADMIN — ABACAVIR 600 MG: 300 TABLET, FILM COATED ORAL at 19:40

## 2017-11-25 RX ADMIN — AZITHROMYCIN 250 MG: 250 TABLET, FILM COATED ORAL at 16:03

## 2017-11-25 RX ADMIN — DAPSONE 100 MG: 100 TABLET ORAL at 21:23

## 2017-11-25 RX ADMIN — GUAIFENESIN 10 ML: 100 SOLUTION ORAL at 03:28

## 2017-11-25 RX ADMIN — SODIUM CHLORIDE 250 ML: 9 INJECTION, SOLUTION INTRAVENOUS at 10:11

## 2017-11-25 RX ADMIN — CALCIUM CARBONATE (ANTACID) CHEW TAB 500 MG 1500 MG: 500 CHEW TAB at 16:03

## 2017-11-25 RX ADMIN — CALCIUM CARBONATE (ANTACID) CHEW TAB 500 MG 1500 MG: 500 CHEW TAB at 20:30

## 2017-11-25 RX ADMIN — LOSARTAN POTASSIUM 25 MG: 25 TABLET, FILM COATED ORAL at 19:48

## 2017-11-25 ASSESSMENT — ACTIVITIES OF DAILY LIVING (ADL)
ADLS_ACUITY_SCORE: 9

## 2017-11-25 NOTE — PLAN OF CARE
Problem: Patient Care Overview  Goal: Plan of Care/Patient Progress Review  Outcome: No Change  Up SBA, ambulated in hallway and was up in chair most of this shift. Pt alert and oriented. No c/o chest pain or SOB, on 2L NC O2 Sat 96%. VSS, LS diminished with RML and RLL crackles. Tele SR.  and 369 SSI given.   Left upper arm fistula Bruit and thrill present.

## 2017-11-25 NOTE — PLAN OF CARE
Dialysis this am. Large BM this afternoon. Productive cough. Crackles though out bilat with exp wheezes. O2 2 liters N/C at rest and 4 liters when up to bathroom for BM or ambulating in the halls. VSS Tele SR

## 2017-11-25 NOTE — PLAN OF CARE
Problem: Patient Care Overview  Goal: Plan of Care/Patient Progress Review  Outcome: No Change  Orientation: Alert and oriented x4.   VSS. 94% on 3 LPM NC, Afebrile.  IVF: SL  Tele: SR. HR 70s.   LS: Diminished, Dyspneic on exertion, RML/RLL crackles, cough persistent, good, productive, sputum sample sent, de-Sating w/sleep O2 increased to 3 LPM  GI: Passing gas. small BM. Requested senna for comfort, Denies N/V.    : No urine output   Skin: bruising noted, Skin otherwise intact.   Activity: SBA to assist of 1. Pt slept comfortably throughout shift.   Pain: No c/o pain. Pt request something for cough PRN Robitussin given. Pt sleeping.   DC Plan: Continue with current cares. Pt would like a laxative after dialysis today

## 2017-11-25 NOTE — PROGRESS NOTES
Potassium   Date Value Ref Range Status   11/24/2017 4.2 3.4 - 5.3 mmol/L Final     Lab Results   Component Value Date    HGB 8.2 11/24/2017     Weight: 90.9 kg (200 lb 8 oz)  POST WT 87.4kg  DIALYSIS PROCEDURE NOTE  Hepatitis status of previous patient on machine log was checked and verified ok to use with this patients hepatitis status.  Patient dialyzed for 3.5 hrs. on a 2 K bath with a net fluid removal of  3.5L.  A BFR of 400 ml/min was obtained via a LAF using 15gauge needles.    The patient was seen by Dr. Bradford during the treatment.  Total heparin received during the treatment: 0 units. Sites held x 25 min then  pressure drsgs applied.  Meds  Given:EPO 15,000units IV Complications: NONE.  Procedure and ESRD teaching done and questions answered. See flowsheet in EPIC for further details and post assessment.  Machine water alarm in place and functioning.  Pt returned via wheelchair  Vascular Access: Aseptic prep done for both on/off.  Report received from:Sania Mendoza R.n.  Report given to:Sania Mendoza R.n.  HEPATITIS B SURFACE ANTIGEN neg DATE 10/24/17 HEPATITIS B SURFACE ANTIBODY Immune DATE 1/18/17  Chlorine/Chloramine water system checked every 4 hours.  Outpatient Dialysis at [facility]

## 2017-11-25 NOTE — PROGRESS NOTES
[facility]  Hospitalist Progress Note  Sherice Bernardo MD 11/25/2017    Reason for Stay (Diagnosis): pneumonia, anemia         Assessment and Plan:      Summary of Stay:   Donald Raza is a 67 year old male with pmhx of AIDs on HAART, ESRD on HD, HTN, CAD s/p stents, anemia, and thrombocytopenia who was admitted on 11/21/2017 with chest pain.  Found to have R sided pneumonia on CTPA and no PE.  Hgb was lower than baseline and down to 5.9 next day.  Received one unit RBCs for symptomatic anemia on 11/22.  Is on a small amount of oxygen here via NC.  Procalcitonin elevated.  Started on ceftriaxone/azithromycin for pneumonia.  Serial troponin negative and doubt ischemic etiology for pain.  Developed fevers and hypoxia while here.  Low CD4 count so ID consulted and broadened abx to vanco/zosyn on 11/23 along with azithromycin.  Nephrology consulted for HD.  Received additional 2 units RBCs on HD 11/23 (ok for non-irradiated products).      Problem List/Assessment and Plan:   Acute hypoxemic respiratory failure due to pneumonia, possible GNRs:  -- Presented with R sided chest pain and some sob. No fever initially or leukocytosis.    --Chest CT shows no PE, but does show a R sided infiltrate.    --Procalcitonin is elevated at > 2.  Viral load suppressed.  Last CD4 was 149.    --Is on dapsone hs for pcp prophylaxis presumably.    --Is requiring 3-4 liters O2 but high O2 sats.    --Initially on ctx/azithro but with  developed  fevers and hypoxia and ABx were broadened to Vanco, zosyn and Azithro  --ID consulted and following given low CD4 count and higher fevers      Acute on chronic anemia:   -- presented with Hgb as low as 5.9 here.    -- has h/o Anemia of chronic disease with HG 8-9 baseline.  -- received 3 units PRBCs in hospital : 1 on 11/22, 2 units with HD on 11/23)   --hgb 7.9  --tranfuse if less than 7      Chest pain with significant CAD hx:   --hx of multiple stents and angiogram 10/2017 for  chest pain that showed no obvious targets then to explain symptoms.    --R sided chest pain radiating to back likely  from his pneumonia  --Serial troponin all 0.02.    --pain management for now and defer further ischemic w/u as less likely cardiac    ESRD on HD: HD T,Th,Sat.  Last HD 11/23 full run.  L arm fistula.    --nephrology consulted  --routine HD while here    HTN: -150.  PTA regimen includes amlodipine 5mg daily, imdur 90mg daily, losartan 25mg daily, hydralazine prn.  -continued pta regimen    IDDM type II: pta on lantus 55U daily am and 15 U lispro tid with meals.    -INCREASE  dose lantus at 25U   -continue lower dose 5U aspart with meals TID and medium dose SSI  -further adjustments as necessary     Thrombocytopenia: plts 115k.  Baseline.  Monitor.    AIDs: last CD4 149 with suppressed viral load.  On HAART and dapsone.  -resume pta HAART and dapsone    DVT Prophylaxis: Pneumatic Compression Devices  Code Status: Full Code  FEN: dialysis diet  Discharge Dispo: home  Estimated Disch Date / # of Days until Disch: likely at least 2-3 more days needs broad spectrum abx        Interval History (Subjective):      Seen patient in dialysis, patient is volume up will likely take off fluids.  chart Still hypoxic, lethargic and weak . R sided chest pain as before. Review of all other systems negative                   Physical Exam:      Last Vital Signs:  /74  Pulse 131  Temp 97.1  F (36.2  C) (Oral)  Resp 18  Wt 90.9 kg (200 lb 8 oz)  SpO2 97%  BMI 27.96 kg/m2    Intake/Output Summary (Last 24 hours) at 11/23/17 1411  Last data filed at 11/23/17 1300   Gross per 24 hour   Intake          1163.17 ml   Output             3500 ml   Net         -2336.83 ml     Constitutional: Awake, NAD  Eyes: sclera white   HEENT:  MMM  Respiratory: anteriorly clear.  No wheeze  Cardiovascular: RRR.  1/6 systolic murmur  GI: soft, non-tender, not distended, bowel sounds present  Skin: no rash    Musculoskeletal/extremities:  No edema.  Palpable thrill and audible bruit L arm  Neurologic: A&O  Psychiatric: calm, cooperative         Medications:      All current medications were reviewed with changes reflected in problem list.         Data:      All new lab and imaging data was reviewed.   Labs:    Recent Labs  Lab 11/25/17 0659 11/24/17 0628 11/23/17  0625   * 133 133   POTASSIUM 4.8 4.2 4.6   CHLORIDE 95 98 96   CO2 25 26 26   ANIONGAP 11 9 11   * 165* 140*   BUN 61* 40* 58*   CR 9.46* 7.36* 9.24*   GFRESTIMATED 6* 7* 6*   GFRESTBLACK 7* 9* 7*   PRABHA 9.2 8.8 8.8       Recent Labs  Lab 11/25/17  0659 11/24/17  0628 11/23/17  0625   WBC 5.3 6.2 6.8   HGB 7.9* 8.2* 6.7*   HCT 24.4* 25.8* 21.7*   MCV 92 93 93   * 107* 113*       Recent Labs  Lab 11/23/17  0250 11/22/17  2215 11/22/17  1900   TROPI 0.027 0.016 <0.015      Imaging:   None today      Sherice Bernardo MD

## 2017-11-25 NOTE — PROGRESS NOTES
Assessment and Plan:   ESRD: typically runs at the [facility]. Running for 4L UF, Getting high dose EPO on the run. No heparin on the run.             Interval History:   Anemia: on EPO, S/P transfusion.   Pneumonia: hx of HIV, followed by ID service.             Review of Systems:   No sx on dialysis. C/O cough and fatigue.           Medications:       sodium chloride 0.9%  250-1,000 mL Intravenous Once in dialysis     epoetin brittni (EPOGEN,PROCRIT) inj ESRD  15,000 Units Intravenous See Admin Instructions     insulin aspart  5 Units Subcutaneous TID w/meals     insulin glargine  25 Units Subcutaneous QAM AC     piperacillin-tazobactam  2.25 g Intravenous Q8H JAN     vancomycin place koenig - receiving intermittent dosing  1 each Does not apply See Admin Instructions     insulin aspart  1-7 Units Subcutaneous TID AC     insulin aspart  1-5 Units Subcutaneous At Bedtime     - MEDICATION INSTRUCTIONS for Dialysis Patients -   Does not apply See Admin Instructions     dapsone  100 mg Oral At Bedtime     ipratropium - albuterol 0.5 mg/2.5 mg/3 mL  3 mL Nebulization 4x daily     abacavir  600 mg Oral QPM     amLODIPine (NORVASC) tablet 5 mg  5 mg Oral Daily     atorvastatin  40 mg Oral At Bedtime     calcium carbonate  1,500 mg Oral TID     clopidogrel (PLAVIX) tablet 75 mg  75 mg Oral QPM     darunavir  800 mg Oral At Bedtime     dolutegravir  50 mg Oral At Bedtime     gabapentin  300 mg Oral QAM     gabapentin  600 mg Oral QPM     isosorbide mononitrate  90 mg Oral QPM     losartan (COZAAR) tablet 25 mg  25 mg Oral QPM     magnesium oxide (MAG-OX) tablet 400 mg  400 mg Oral At Bedtime     omeprazole  40 mg Oral Daily     ritonavir  100 mg Oral At Bedtime     azithromycin  250 mg Oral Daily     sodium chloride (PF)  3 mL Intracatheter Q8H       - MEDICATION INSTRUCTIONS -       Current active medications and PTA medications reviewed, see medication list for details.            Physical Exam:   Vitals  were reviewed  Patient Vitals for the past 24 hrs:   BP Temp Temp src Heart Rate Resp SpO2 Weight   17 1000 (P) 142/74 - - (P) 78 - - -   17 0945 138/74 - - 78 - - -   17 0930 152/74 - - 78 - - -   17 0915 155/74 - - 78 - - -   17 0900 152/74 - - 78 - - -   17 0845 153/74 - - 78 15 - -   17 0830 142/74 - - 74 18 - -   17 0815 125/74 - - 74 18 - -   17 0800 138/71 97.1  F (36.2  C) Oral 72 18 97 % -   17 0745 142/74 - - 74 15 - -   17 0739 147/66 97  F (36.1  C) Oral 74 18 94 % 90.9 kg (200 lb 8 oz)   17 0719 - - - - - 93 % -   17 0329 - - - - - 94 % -   17 0300 - 99.1  F (37.3  C) Axillary - - 92 % -   17 2348 144/66 98.8  F (37.1  C) Oral 74 16 93 % -   17 1938 - - - - - 96 % -   17 1617 144/60 98.9  F (37.2  C) Oral 72 18 96 % -   17 1556 - - - 70 20 95 % -   17 1212 142/62 99.3  F (37.4  C) Oral 74 21 91 % -   17 1205 - - - 74 22 92 % -   17 1147 - - - - - 90 % -   17 1145 - - - - - (!) 87 % -       Temp:  [97  F (36.1  C)-99.3  F (37.4  C)] 97.1  F (36.2  C)  Heart Rate:  [70-78] (P) 78  Resp:  [15-22] 15  BP: (125-155)/(60-74) (P) 142/74  SpO2:  [87 %-97 %] 97 %    Temperatures:  Current - Temp: 97.1  F (36.2  C); Max - Temp  Av.4  F (36.9  C)  Min: 97  F (36.1  C)  Max: 99.3  F (37.4  C)  Respiration range: Resp  Av.1  Min: 15  Max: 22  Pulse range: No Data Recorded  Blood pressure range: Systolic (24hrs), Av , Min:125 , Max:155   ; Diastolic (24hrs), Av, Min:60, Max:74    Pulse oximetry range: SpO2  Av.1 %  Min: 87 %  Max: 97 %    I/O last 3 completed shifts:  In: 540 [P.O.:480; I.V.:60]  Out: -       Intake/Output Summary (Last 24 hours) at 17 1020  Last data filed at 17 1358   Gross per 24 hour   Intake              300 ml   Output                0 ml   Net              300 ml       Alert, responsive  LAF with needles in place       Wt  Readings from Last 4 Encounters:   11/25/17 90.9 kg (200 lb 8 oz)   10/12/17 89.8 kg (197 lb 15.6 oz)   08/11/17 89.8 kg (198 lb)   08/05/17 90 kg (198 lb 6.6 oz)          Data:          Lab Results   Component Value Date     11/25/2017     11/24/2017     11/23/2017    Lab Results   Component Value Date    CHLORIDE 95 11/25/2017    CHLORIDE 98 11/24/2017    CHLORIDE 96 11/23/2017    Lab Results   Component Value Date    BUN 61 11/25/2017    BUN 40 11/24/2017    BUN 58 11/23/2017      Lab Results   Component Value Date    POTASSIUM 4.8 11/25/2017    POTASSIUM 4.2 11/24/2017    POTASSIUM 4.6 11/23/2017    Lab Results   Component Value Date    CO2 25 11/25/2017    CO2 26 11/24/2017    CO2 26 11/23/2017    Lab Results   Component Value Date    CR 9.46 11/25/2017    CR 7.36 11/24/2017    CR 9.24 11/23/2017        Recent Labs   Lab Test  11/25/17   0659  11/24/17   0628  11/23/17   0625   WBC  5.3  6.2  6.8   HGB  7.9*  8.2*  6.7*   HCT  24.4*  25.8*  21.7*   MCV  92  93  93   PLT  117*  107*  113*     Recent Labs   Lab Test  09/20/17   1211  08/09/17   0818  06/05/17   1138  04/15/17   0819  04/09/17   0620  03/28/17   1953   07/06/14   2255   AST  23   --    --   20   --   23   < >  18   ALT  23  22  21  22   --   29   < >  17   ALKPHOS  99   --    --   77   --   120   < >  132   BILITOTAL  0.6   --    --   0.4  0.3  0.6   < >  0.5   BILICONJ   --    --    --    --    --    --    --   0.0    < > = values in this interval not displayed.       Recent Labs   Lab Test  10/10/17   2225  04/15/17   0819  04/09/17   0620   MAG  1.9  2.8*  2.6*     Recent Labs   Lab Test  04/15/17   0819  04/11/17   0628  04/10/17   0729   PHOS  6.9*  4.9*  4.2     Recent Labs   Lab Test  11/25/17   0659  11/24/17   0628 11/23/17   0625   PRABHA  9.2  8.8  8.8       Lab Results   Component Value Date    PRABHA 9.2 11/25/2017     Lab Results   Component Value Date    WBC 5.3 11/25/2017    HGB 7.9 (L) 11/25/2017    HCT 24.4 (L)  11/25/2017    MCV 92 11/25/2017     (L) 11/25/2017     Lab Results   Component Value Date     (L) 11/25/2017    POTASSIUM 4.8 11/25/2017    CHLORIDE 95 11/25/2017    CO2 25 11/25/2017     (H) 11/25/2017     Lab Results   Component Value Date    BUN 61 (H) 11/25/2017    CR 9.46 (H) 11/25/2017     Lab Results   Component Value Date    MAG 1.9 10/10/2017     Lab Results   Component Value Date    PHOS 6.9 (H) 04/15/2017       Creatinine   Date Value Ref Range Status   11/25/2017 9.46 (H) 0.66 - 1.25 mg/dL Final   11/24/2017 7.36 (H) 0.66 - 1.25 mg/dL Final   11/23/2017 9.24 (H) 0.66 - 1.25 mg/dL Final   11/22/2017 6.80 (H) 0.66 - 1.25 mg/dL Final   11/21/2017 4.77 (H) 0.66 - 1.25 mg/dL Final   10/10/2017 6.14 (H) 0.66 - 1.25 mg/dL Final       Attestation:  I have reviewed today's vital signs, notes, medications, labs and imaging.  Seen on dialysis.      Eric Bradford MD

## 2017-11-26 LAB
ANION GAP SERPL CALCULATED.3IONS-SCNC: 9 MMOL/L (ref 3–14)
BASOPHILS # BLD AUTO: 0 10E9/L (ref 0–0.2)
BASOPHILS NFR BLD AUTO: 0.9 %
BUN SERPL-MCNC: 41 MG/DL (ref 7–30)
CALCIUM SERPL-MCNC: 9.2 MG/DL (ref 8.5–10.1)
CHLORIDE SERPL-SCNC: 98 MMOL/L (ref 94–109)
CO2 SERPL-SCNC: 27 MMOL/L (ref 20–32)
CREAT SERPL-MCNC: 6.97 MG/DL (ref 0.66–1.25)
DIFFERENTIAL METHOD BLD: ABNORMAL
EOSINOPHIL # BLD AUTO: 0.2 10E9/L (ref 0–0.7)
EOSINOPHIL NFR BLD AUTO: 3.4 %
ERYTHROCYTE [DISTWIDTH] IN BLOOD BY AUTOMATED COUNT: 14.6 % (ref 10–15)
GFR SERPL CREATININE-BSD FRML MDRD: 8 ML/MIN/1.7M2
GLUCOSE BLDC GLUCOMTR-MCNC: 113 MG/DL (ref 70–99)
GLUCOSE BLDC GLUCOMTR-MCNC: 118 MG/DL (ref 70–99)
GLUCOSE BLDC GLUCOMTR-MCNC: 158 MG/DL (ref 70–99)
GLUCOSE BLDC GLUCOMTR-MCNC: 177 MG/DL (ref 70–99)
GLUCOSE BLDC GLUCOMTR-MCNC: 246 MG/DL (ref 70–99)
GLUCOSE SERPL-MCNC: 171 MG/DL (ref 70–99)
HCT VFR BLD AUTO: 26.6 % (ref 40–53)
HGB BLD-MCNC: 8.3 G/DL (ref 13.3–17.7)
IMM GRANULOCYTES # BLD: 0 10E9/L (ref 0–0.4)
IMM GRANULOCYTES NFR BLD: 0.7 %
INTERPRETATION ECG - MUSE: NORMAL
LYMPHOCYTES # BLD AUTO: 0.8 10E9/L (ref 0.8–5.3)
LYMPHOCYTES NFR BLD AUTO: 17 %
MCH RBC QN AUTO: 28.4 PG (ref 26.5–33)
MCHC RBC AUTO-ENTMCNC: 31.2 G/DL (ref 31.5–36.5)
MCV RBC AUTO: 91 FL (ref 78–100)
MONOCYTES # BLD AUTO: 0.4 10E9/L (ref 0–1.3)
MONOCYTES NFR BLD AUTO: 9.5 %
NEUTROPHILS # BLD AUTO: 3 10E9/L (ref 1.6–8.3)
NEUTROPHILS NFR BLD AUTO: 68.5 %
NRBC # BLD AUTO: 0 10*3/UL
NRBC BLD AUTO-RTO: 0 /100
PLATELET # BLD AUTO: 146 10E9/L (ref 150–450)
POTASSIUM SERPL-SCNC: 4.2 MMOL/L (ref 3.4–5.3)
RBC # BLD AUTO: 2.92 10E12/L (ref 4.4–5.9)
SODIUM SERPL-SCNC: 134 MMOL/L (ref 133–144)
WBC # BLD AUTO: 4.4 10E9/L (ref 4–11)

## 2017-11-26 PROCEDURE — 40000274 ZZH STATISTIC RCP CONSULT EA 30 MIN

## 2017-11-26 PROCEDURE — 80048 BASIC METABOLIC PNL TOTAL CA: CPT | Performed by: INTERNAL MEDICINE

## 2017-11-26 PROCEDURE — 36415 COLL VENOUS BLD VENIPUNCTURE: CPT | Performed by: INTERNAL MEDICINE

## 2017-11-26 PROCEDURE — A9270 NON-COVERED ITEM OR SERVICE: HCPCS | Mod: GY | Performed by: INTERNAL MEDICINE

## 2017-11-26 PROCEDURE — 25000128 H RX IP 250 OP 636: Performed by: INTERNAL MEDICINE

## 2017-11-26 PROCEDURE — 99233 SBSQ HOSP IP/OBS HIGH 50: CPT | Performed by: INTERNAL MEDICINE

## 2017-11-26 PROCEDURE — 85025 COMPLETE CBC W/AUTO DIFF WBC: CPT | Performed by: INTERNAL MEDICINE

## 2017-11-26 PROCEDURE — 94640 AIRWAY INHALATION TREATMENT: CPT | Mod: 76

## 2017-11-26 PROCEDURE — 25000132 ZZH RX MED GY IP 250 OP 250 PS 637: Mod: GY | Performed by: INTERNAL MEDICINE

## 2017-11-26 PROCEDURE — 00000146 ZZHCL STATISTIC GLUCOSE BY METER IP

## 2017-11-26 PROCEDURE — 12000007 ZZH R&B INTERMEDIATE

## 2017-11-26 PROCEDURE — 40000275 ZZH STATISTIC RCP TIME EA 10 MIN

## 2017-11-26 PROCEDURE — 25000125 ZZHC RX 250: Performed by: HOSPITALIST

## 2017-11-26 PROCEDURE — 94640 AIRWAY INHALATION TREATMENT: CPT

## 2017-11-26 PROCEDURE — 25000131 ZZH RX MED GY IP 250 OP 636 PS 637: Mod: GY | Performed by: INTERNAL MEDICINE

## 2017-11-26 RX ADMIN — OMEPRAZOLE 40 MG: 20 CAPSULE, DELAYED RELEASE ORAL at 17:47

## 2017-11-26 RX ADMIN — ISOSORBIDE MONONITRATE 90 MG: 60 TABLET, EXTENDED RELEASE ORAL at 23:54

## 2017-11-26 RX ADMIN — INSULIN ASPART 1 UNITS: 100 INJECTION, SOLUTION INTRAVENOUS; SUBCUTANEOUS at 09:26

## 2017-11-26 RX ADMIN — CALCIUM CARBONATE (ANTACID) CHEW TAB 500 MG 1500 MG: 500 CHEW TAB at 09:24

## 2017-11-26 RX ADMIN — AZITHROMYCIN 250 MG: 250 TABLET, FILM COATED ORAL at 16:01

## 2017-11-26 RX ADMIN — CALCIUM CARBONATE (ANTACID) CHEW TAB 500 MG 1500 MG: 500 CHEW TAB at 23:55

## 2017-11-26 RX ADMIN — DAPSONE 100 MG: 100 TABLET ORAL at 23:55

## 2017-11-26 RX ADMIN — INSULIN GLARGINE 25 UNITS: 100 INJECTION, SOLUTION SUBCUTANEOUS at 09:24

## 2017-11-26 RX ADMIN — MAGNESIUM OXIDE TAB 400 MG (241.3 MG ELEMENTAL MG) 400 MG: 400 (241.3 MG) TAB at 23:54

## 2017-11-26 RX ADMIN — AMLODIPINE BESYLATE 5 MG: 5 TABLET ORAL at 09:24

## 2017-11-26 RX ADMIN — GABAPENTIN 300 MG: 300 CAPSULE ORAL at 09:24

## 2017-11-26 RX ADMIN — CLONAZEPAM 0.5 MG: 0.5 TABLET ORAL at 01:46

## 2017-11-26 RX ADMIN — TAZOBACTAM SODIUM AND PIPERACILLIN SODIUM 2.25 G: 250; 2 INJECTION, SOLUTION INTRAVENOUS at 20:13

## 2017-11-26 RX ADMIN — TAZOBACTAM SODIUM AND PIPERACILLIN SODIUM 2.25 G: 250; 2 INJECTION, SOLUTION INTRAVENOUS at 10:11

## 2017-11-26 RX ADMIN — IPRATROPIUM BROMIDE AND ALBUTEROL SULFATE 3 ML: .5; 3 SOLUTION RESPIRATORY (INHALATION) at 15:51

## 2017-11-26 RX ADMIN — RITONAVIR 100 MG: 100 TABLET, FILM COATED ORAL at 23:54

## 2017-11-26 RX ADMIN — CALCIUM CARBONATE (ANTACID) CHEW TAB 500 MG 1500 MG: 500 CHEW TAB at 16:01

## 2017-11-26 RX ADMIN — ABACAVIR 600 MG: 300 TABLET, FILM COATED ORAL at 23:54

## 2017-11-26 RX ADMIN — ATORVASTATIN CALCIUM 40 MG: 40 TABLET, FILM COATED ORAL at 23:54

## 2017-11-26 RX ADMIN — DOLUTEGRAVIR SODIUM 50 MG: 50 TABLET, FILM COATED ORAL at 23:54

## 2017-11-26 RX ADMIN — GABAPENTIN 600 MG: 300 CAPSULE ORAL at 23:55

## 2017-11-26 RX ADMIN — CLOPIDOGREL 75 MG: 75 TABLET, FILM COATED ORAL at 23:55

## 2017-11-26 RX ADMIN — LOSARTAN POTASSIUM 25 MG: 25 TABLET, FILM COATED ORAL at 23:54

## 2017-11-26 RX ADMIN — TAZOBACTAM SODIUM AND PIPERACILLIN SODIUM 2.25 G: 250; 2 INJECTION, SOLUTION INTRAVENOUS at 01:03

## 2017-11-26 RX ADMIN — IPRATROPIUM BROMIDE AND ALBUTEROL SULFATE 3 ML: .5; 3 SOLUTION RESPIRATORY (INHALATION) at 19:40

## 2017-11-26 RX ADMIN — DARUNAVIR 800 MG: 800 TABLET, FILM COATED ORAL at 23:53

## 2017-11-26 RX ADMIN — IPRATROPIUM BROMIDE AND ALBUTEROL SULFATE 3 ML: .5; 3 SOLUTION RESPIRATORY (INHALATION) at 12:08

## 2017-11-26 ASSESSMENT — ACTIVITIES OF DAILY LIVING (ADL)
ADLS_ACUITY_SCORE: 9

## 2017-11-26 NOTE — PLAN OF CARE
Problem: Patient Care Overview  Goal: Plan of Care/Patient Progress Review  Outcome: No Change  Neuro: A & O  Tele: Sinus rhythm, peaked T waves  Pulmonary: Shallow, dyspnea  GI: Loose stool x1. Active BS  : Not voiding, dialysis patient  Diet: Dialysis/Renal diet  Activity: SBA  Plan: Home in 2-3 more days after broad spectrum abx.

## 2017-11-26 NOTE — PROGRESS NOTES
[facility]  Hospitalist Progress Note  Sherice Bernardo MD 11/26/2017    Reason for Stay (Diagnosis): pneumonia, anemia         Assessment and Plan:      Summary of Stay:   Donald Raza is a 67 year old male with pmhx of AIDs on HAART, ESRD on HD, HTN, CAD s/p stents, anemia, and thrombocytopenia who was admitted on 11/21/2017 with chest pain.  Found to have R sided pneumonia on CTPA and no PE.  Hgb was lower than baseline and down to 5.9 next day.  Received one unit RBCs for symptomatic anemia on 11/22.  Is on a small amount of oxygen here via NC.  Procalcitonin elevated.  Started on ceftriaxone/azithromycin for pneumonia.  Serial troponin negative and doubt ischemic etiology for pain.  Developed fevers and hypoxia while here.  Low CD4 count so ID consulted and broadened abx to vanco/zosyn on 11/23 along with azithromycin.  Nephrology consulted for HD.  Received additional 2 units RBCs on HD 11/23 (ok for non-irradiated products).      Problem List/Assessment and Plan:   Acute hypoxemic respiratory failure due to pneumonia, possible GNRs:  -- Presented with R sided chest pain and some sob. No fever initially or leukocytosis.    --Chest CT shows no PE, but does show a R sided infiltrate.    --Procalcitonin is elevated at > 2.  Viral load suppressed.  Last CD4 was 149.    --Is on dapsone hs for pcp prophylaxis presumably.    --Is requiring 3-4 liters O2 but high O2 sats.  Will wean O2 as able  --Initially on ctx/azithro but with  developed  fevers and hypoxia and ABx were broadened to Vanco, zosyn and Azithro  --ID consulted and following given low CD4 count and higher fevers      Acute on chronic anemia:   -- presented with Hgb as low as 5.9 here.    -- has h/o Anemia of chronic disease with HG 8-9 baseline.  -- received 3 units PRBCs in hospital : 1 on 11/22, 2 units with HD on 11/23)   --hgb 7.9  --tranfuse if less than 7      Chest pain with significant CAD hx:   --hx of multiple stents and  angiogram 10/2017 for chest pain that showed no obvious targets then to explain symptoms.    --R sided chest pain radiating to back likely  from his pneumonia  --Serial troponin all 0.02.    --pain management for now and defer further ischemic w/u as less likely cardiac    ESRD on HD: HD T,Th,Sat.  Last HD 11/23 full run.  L arm fistula.    --nephrology consulted  --routine HD while here    HTN: -150.  PTA regimen includes amlodipine 5mg daily, imdur 90mg daily, losartan 25mg daily, hydralazine prn.  -continued pta regimen    IDDM type II: pta on lantus 55U daily am and 15 U lispro tid with meals.    -INCREASE  dose lantus at 25U   -continue lower dose 5U aspart with meals TID and medium dose SSI  -further adjustments as necessary     Thrombocytopenia: plts 115k.  Baseline.  Monitor.    AIDs: last CD4 149 with suppressed viral load.  On HAART and dapsone.  -resume pta HAART and dapsone    DVT Prophylaxis: Pneumatic Compression Devices  Code Status: Full Code  FEN: dialysis diet  Discharge Dispo: home  Estimated Disch Date / # of Days until Disch: TBD until Off supplemental O2 and no further need for IV antibiotics        Interval History (Subjective):       Still hypoxic  but minimal improvement in breathing still has R sided chest pain as before. Review of all other systems negative                   Physical Exam:      Last Vital Signs:  /55 (BP Location: Right arm)  Pulse 79  Temp 97.4  F (36.3  C) (Oral)  Resp 18  Wt 88.7 kg (195 lb 9.6 oz)  SpO2 92%  BMI 27.28 kg/m2    Intake/Output Summary (Last 24 hours) at 11/23/17 1411  Last data filed at 11/23/17 1300   Gross per 24 hour   Intake          1163.17 ml   Output             3500 ml   Net         -2336.83 ml     Constitutional: Awake, NAD  Eyes: sclera white   HEENT:  MMM  Respiratory: anteriorly clear.  No wheeze  Cardiovascular: RRR.  1/6 systolic murmur  GI: soft, non-tender, not distended, bowel sounds present  Skin: no rash    Musculoskeletal/extremities:  No edema.  Palpable thrill and audible bruit L arm  Neurologic: A&O  Psychiatric: calm, cooperative         Medications:      All current medications were reviewed with changes reflected in problem list.         Data:      All new lab and imaging data was reviewed.   Labs:    Recent Labs  Lab 11/26/17  0650 11/25/17 0659 11/24/17  0628    131* 133   POTASSIUM 4.2 4.8 4.2   CHLORIDE 98 95 98   CO2 27 25 26   ANIONGAP 9 11 9   * 165* 165*   BUN 41* 61* 40*   CR 6.97* 9.46* 7.36*   GFRESTIMATED 8* 6* 7*   GFRESTBLACK 10* 7* 9*   PRABHA 9.2 9.2 8.8       Recent Labs  Lab 11/26/17  0650 11/25/17  0659 11/24/17  0628   WBC 4.4 5.3 6.2   HGB 8.3* 7.9* 8.2*   HCT 26.6* 24.4* 25.8*   MCV 91 92 93   * 117* 107*       Recent Labs  Lab 11/23/17  0250 11/22/17  2215 11/22/17  1900   TROPI 0.027 0.016 <0.015      Imaging:   None today      Sherice Bernardo MD

## 2017-11-26 NOTE — PLAN OF CARE
"[facility] RCAT     Date: 11/26/2017  Admission Dx: Hypoxemic resp failure, CT R side infiltrate  Pulmonary History PNA, AIDS  Home Nebulizer/MDI Use: no  Home Oxygen: no  Acuity Level (RCAT flow sheet):3  Aerosol Therapy initiated:  Duoneb Qis, Alb Q2prn    Pulmonary Hygiene initiated: Cough andDeep breathing, Aerobika       Volume Expansion initiated:IS      Current Oxygen Requirements:  1.5lpm NC  Current SpO2: 96%    Re-evaluation date:11/29/2017    Patient Education: Education delivered in regards to bronchodilator therapy, pt acknowledged understanding of material discussed      See \"RT Assessments\" flow sheet for patient assessment scoring and Acuity Level Details.               "

## 2017-11-26 NOTE — PLAN OF CARE
Problem: Patient Care Overview  Goal: Plan of Care/Patient Progress Review  Outcome: Improving  Patient A/O, denies pain and HA, reports dizziness when up. HR reg, SR on tele, mumur present, hypertensive, pulses palpable, no edema, numbness and tingling to BLE's and reports a cool feeling in these extremities. 2 L O2, sats adequate, SOB with activity, occasional productive cough, lungs dim with crackles in R upper lobe, encouraged to use I.S. BS+, no N/V, tolerating diet, passing gas, BM this shift, Bg stable. No void this shift, fistula to L arm with bruit and thrill. PIV x2 SL, ID following, AM labs, VSS.

## 2017-11-27 LAB
BACTERIA SPEC CULT: NO GROWTH
BACTERIA SPEC CULT: NO GROWTH
GLUCOSE BLDC GLUCOMTR-MCNC: 153 MG/DL (ref 70–99)
GLUCOSE BLDC GLUCOMTR-MCNC: 181 MG/DL (ref 70–99)
GLUCOSE BLDC GLUCOMTR-MCNC: 208 MG/DL (ref 70–99)
GLUCOSE BLDC GLUCOMTR-MCNC: 237 MG/DL (ref 70–99)
GLUCOSE BLDC GLUCOMTR-MCNC: 238 MG/DL (ref 70–99)
Lab: NORMAL
Lab: NORMAL
SPECIMEN SOURCE: NORMAL
SPECIMEN SOURCE: NORMAL

## 2017-11-27 PROCEDURE — 25000128 H RX IP 250 OP 636: Performed by: INTERNAL MEDICINE

## 2017-11-27 PROCEDURE — 12000007 ZZH R&B INTERMEDIATE

## 2017-11-27 PROCEDURE — A9270 NON-COVERED ITEM OR SERVICE: HCPCS | Mod: GY | Performed by: INTERNAL MEDICINE

## 2017-11-27 PROCEDURE — 25000125 ZZHC RX 250: Performed by: HOSPITALIST

## 2017-11-27 PROCEDURE — 25000132 ZZH RX MED GY IP 250 OP 250 PS 637: Mod: GY | Performed by: INTERNAL MEDICINE

## 2017-11-27 PROCEDURE — 40000275 ZZH STATISTIC RCP TIME EA 10 MIN

## 2017-11-27 PROCEDURE — 25000131 ZZH RX MED GY IP 250 OP 636 PS 637: Mod: GY | Performed by: INTERNAL MEDICINE

## 2017-11-27 PROCEDURE — 25000125 ZZHC RX 250: Performed by: INTERNAL MEDICINE

## 2017-11-27 PROCEDURE — 94640 AIRWAY INHALATION TREATMENT: CPT | Mod: 76

## 2017-11-27 PROCEDURE — 99233 SBSQ HOSP IP/OBS HIGH 50: CPT | Performed by: INTERNAL MEDICINE

## 2017-11-27 PROCEDURE — 94640 AIRWAY INHALATION TREATMENT: CPT

## 2017-11-27 PROCEDURE — 00000146 ZZHCL STATISTIC GLUCOSE BY METER IP

## 2017-11-27 RX ADMIN — RITONAVIR 100 MG: 100 TABLET, FILM COATED ORAL at 21:20

## 2017-11-27 RX ADMIN — ACETAMINOPHEN 975 MG: 325 TABLET, FILM COATED ORAL at 20:18

## 2017-11-27 RX ADMIN — INSULIN GLARGINE 25 UNITS: 100 INJECTION, SOLUTION SUBCUTANEOUS at 08:36

## 2017-11-27 RX ADMIN — CALCIUM CARBONATE (ANTACID) CHEW TAB 500 MG 1500 MG: 500 CHEW TAB at 17:15

## 2017-11-27 RX ADMIN — OMEPRAZOLE 40 MG: 20 CAPSULE, DELAYED RELEASE ORAL at 17:13

## 2017-11-27 RX ADMIN — IPRATROPIUM BROMIDE AND ALBUTEROL SULFATE 3 ML: .5; 3 SOLUTION RESPIRATORY (INHALATION) at 19:22

## 2017-11-27 RX ADMIN — DOLUTEGRAVIR SODIUM 50 MG: 50 TABLET, FILM COATED ORAL at 21:20

## 2017-11-27 RX ADMIN — NITROGLYCERIN 0.4 MG: 0.4 TABLET SUBLINGUAL at 21:38

## 2017-11-27 RX ADMIN — IPRATROPIUM BROMIDE AND ALBUTEROL SULFATE 3 ML: .5; 3 SOLUTION RESPIRATORY (INHALATION) at 15:34

## 2017-11-27 RX ADMIN — INSULIN ASPART 2 UNITS: 100 INJECTION, SOLUTION INTRAVENOUS; SUBCUTANEOUS at 08:35

## 2017-11-27 RX ADMIN — IPRATROPIUM BROMIDE AND ALBUTEROL SULFATE 3 ML: .5; 3 SOLUTION RESPIRATORY (INHALATION) at 08:29

## 2017-11-27 RX ADMIN — CALCIUM CARBONATE (ANTACID) CHEW TAB 500 MG 1500 MG: 500 CHEW TAB at 12:23

## 2017-11-27 RX ADMIN — CALCIUM CARBONATE (ANTACID) CHEW TAB 500 MG 1500 MG: 500 CHEW TAB at 08:34

## 2017-11-27 RX ADMIN — TAZOBACTAM SODIUM AND PIPERACILLIN SODIUM 2.25 G: 250; 2 INJECTION, SOLUTION INTRAVENOUS at 19:40

## 2017-11-27 RX ADMIN — ISOSORBIDE MONONITRATE 90 MG: 60 TABLET, EXTENDED RELEASE ORAL at 19:36

## 2017-11-27 RX ADMIN — IPRATROPIUM BROMIDE AND ALBUTEROL SULFATE 3 ML: .5; 3 SOLUTION RESPIRATORY (INHALATION) at 11:19

## 2017-11-27 RX ADMIN — INSULIN ASPART 1 UNITS: 100 INJECTION, SOLUTION INTRAVENOUS; SUBCUTANEOUS at 12:23

## 2017-11-27 RX ADMIN — GABAPENTIN 300 MG: 300 CAPSULE ORAL at 08:34

## 2017-11-27 RX ADMIN — DAPSONE 100 MG: 100 TABLET ORAL at 21:20

## 2017-11-27 RX ADMIN — LOSARTAN POTASSIUM 25 MG: 25 TABLET, FILM COATED ORAL at 19:36

## 2017-11-27 RX ADMIN — TAZOBACTAM SODIUM AND PIPERACILLIN SODIUM 2.25 G: 250; 2 INJECTION, SOLUTION INTRAVENOUS at 04:20

## 2017-11-27 RX ADMIN — GABAPENTIN 600 MG: 300 CAPSULE ORAL at 19:36

## 2017-11-27 RX ADMIN — CLOPIDOGREL 75 MG: 75 TABLET, FILM COATED ORAL at 19:36

## 2017-11-27 RX ADMIN — ATORVASTATIN CALCIUM 40 MG: 40 TABLET, FILM COATED ORAL at 21:20

## 2017-11-27 RX ADMIN — ALBUTEROL SULFATE 2.5 MG: 2.5 SOLUTION RESPIRATORY (INHALATION) at 23:17

## 2017-11-27 RX ADMIN — ABACAVIR 600 MG: 300 TABLET, FILM COATED ORAL at 19:35

## 2017-11-27 RX ADMIN — AMLODIPINE BESYLATE 5 MG: 5 TABLET ORAL at 08:34

## 2017-11-27 RX ADMIN — INSULIN ASPART 1 UNITS: 100 INJECTION, SOLUTION INTRAVENOUS; SUBCUTANEOUS at 17:40

## 2017-11-27 RX ADMIN — DARUNAVIR 800 MG: 800 TABLET, FILM COATED ORAL at 21:20

## 2017-11-27 RX ADMIN — TAZOBACTAM SODIUM AND PIPERACILLIN SODIUM 2.25 G: 250; 2 INJECTION, SOLUTION INTRAVENOUS at 11:43

## 2017-11-27 RX ADMIN — MAGNESIUM OXIDE TAB 400 MG (241.3 MG ELEMENTAL MG) 400 MG: 400 (241.3 MG) TAB at 21:20

## 2017-11-27 ASSESSMENT — ACTIVITIES OF DAILY LIVING (ADL)
ADLS_ACUITY_SCORE: 9

## 2017-11-27 NOTE — PROGRESS NOTES
[facility]  Infectious Disease Progress Note          Assessment and Plan:   IMPRESSION:   1.  A 67-year-old male very well-known to me through multiple hospitalizations, admitted with acute respiratory symptoms, found to have a degree of infiltrate, probably conventional pneumonia.  In the past he has had multiple episodes similar to this generally responding to conventional antibiotics, no positive cultures to date, but clinical worsening on ceftriaxone and Zithromax.  I think that is more likely due to a more resistant subhash of bacteria, possibly health care-associated than due to opportunistic infection.   2.  History of longstanding HIV infection with T-cells generally in the 140-200 range, most recently 149, has been on chronic prophylactic dapsone, relatively unlikely to have opportunistic infection despite the low T-cells.   3.  End-stage renal disease on dialysis, thus frequent Health Care contact.   4.  Multiple respiratory infections in the past, severe influenza earlier this year, prior to that at least one intubated severe pneumonia, some perception of possible aspiration or chronic lung disease in the past, but neither formally diagnosed.   5.  History of coronary artery disease.   6.  Sulfa allergy.       RECOMMENDATIONS:   1.  Completed Z-HELEN for atypicals.   2.   vancomycin on board and Zosyn, seems clearly better, sputum neg, likely Ok augmentin and disposition soon  3  Continue HIV medications and dapsone on same regimen that has been successful at controlling virus previously.               Interval History:   no new complaints and doing better O2 OK wo O2 no cp, sob, n/v/d, or abd pain. resolved LGF, no new focal sxs, cxs neg              Medications:       insulin aspart  5 Units Subcutaneous TID w/meals     insulin glargine  25 Units Subcutaneous QAM AC     piperacillin-tazobactam  2.25 g Intravenous Q8H [facility]     insulin aspart  1-7 Units Subcutaneous TID AC     insulin  aspart  1-5 Units Subcutaneous At Bedtime     - MEDICATION INSTRUCTIONS for Dialysis Patients -   Does not apply See Admin Instructions     dapsone  100 mg Oral At Bedtime     ipratropium - albuterol 0.5 mg/2.5 mg/3 mL  3 mL Nebulization 4x daily     abacavir  600 mg Oral QPM     amLODIPine (NORVASC) tablet 5 mg  5 mg Oral Daily     atorvastatin  40 mg Oral At Bedtime     calcium carbonate  1,500 mg Oral TID     clopidogrel (PLAVIX) tablet 75 mg  75 mg Oral QPM     darunavir  800 mg Oral At Bedtime     dolutegravir  50 mg Oral At Bedtime     gabapentin  300 mg Oral QAM     gabapentin  600 mg Oral QPM     isosorbide mononitrate  90 mg Oral QPM     losartan (COZAAR) tablet 25 mg  25 mg Oral QPM     magnesium oxide (MAG-OX) tablet 400 mg  400 mg Oral At Bedtime     omeprazole  40 mg Oral Daily     ritonavir  100 mg Oral At Bedtime     sodium chloride (PF)  3 mL Intracatheter Q8H                  Physical Exam:   Blood pressure 137/57, pulse 79, temperature 98.2  F (36.8  C), temperature source Oral, resp. rate 18, weight 89.1 kg (196 lb 6.4 oz), SpO2 91 %.  Wt Readings from Last 2 Encounters:   11/27/17 89.1 kg (196 lb 6.4 oz)   10/12/17 89.8 kg (197 lb 15.6 oz)     Vital Signs with Ranges  Temp:  [96  F (35.6  C)-98.2  F (36.8  C)] 98.2  F (36.8  C)  Heart Rate:  [65-78] 78  Resp:  [18-20] 18  BP: (137-151)/(57-70) 137/57  SpO2:  [90 %-96 %] 91 %    Constitutional: Awake, alert, cooperative, no apparent distress looks Ok on O2   Lungs: Clear to auscultation bilaterally, few crackles R sl  wheezing   Cardiovascular: Regular rate and rhythm, normal S1 and S2, and no murmur noted   Abdomen: Normal bowel sounds, soft, non-distended, non-tender   Skin: No rashes, no cyanosis, same edema   Other:           Data:   All microbiology laboratory data reviewed.  Recent Labs   Lab Test  11/26/17   0650  11/25/17   0659  11/24/17   0628   WBC  4.4  5.3  6.2   HGB  8.3*  7.9*  8.2*   HCT  26.6*  24.4*  25.8*   MCV  91  92  93   PLT   146*  117*  107*     Recent Labs   Lab Test  11/26/17   0650  11/25/17   0659  11/24/17   0628   CR  6.97*  9.46*  7.36*     No lab results found.  Recent Labs   Lab Test  11/25/17   0350  11/22/17 2000 11/22/17 1958 11/21/17   1921  11/21/17   1405  03/30/17   0800  03/29/17   0131  03/28/17   2044  03/28/17 2015   CULT  Culture negative monitoring continues  POS GRAM GNRF  Hold for Review    No growth after 5 days  No growth after 5 days  No growth  No growth  Light growth Normal subhash  Canceled, Test credited >10 Squamous epithelial cells/low power field indicates   oral contamination. Please recollect. Notification of test cancellation was   given to Jennifer Pacheco RN [id number] 03.29.17 CF  *  10,000 to 50,000 colonies/mL mixed urogenital subhash  Susceptibility testing not routinely done    No growth

## 2017-11-27 NOTE — PLAN OF CARE
Patient more tired today. Needs encouragement to get up for meals. Weaning O2- now 1.5 L N/C with sat at 93%. IV site loss. 2 unsuccessful attempts. Will request flyer attempt. VSS Tele SR with peaked T waves. Lung decreased with crackles through out. Productive cough with bloody sputum.

## 2017-11-27 NOTE — PLAN OF CARE
Problem: Patient Care Overview  Goal: Plan of Care/Patient Progress Review  Outcome: No Change  VS stable. 93% on 1.5L 02. Diminished LS with crackles in the bases. No complaints of pain. Tele is sinus rhythm. Slept well throughout the night.

## 2017-11-27 NOTE — PLAN OF CARE
Problem: Pneumonia (Adult)  Goal: Signs and Symptoms of Listed Potential Problems Will be Absent, Minimized or Managed (Pneumonia)  Signs and symptoms of listed potential problems will be absent, minimized or managed by discharge/transition of care (reference Pneumonia (Adult) CPG).   Outcome: Improving  VSS on Bp meds. Weaning o2. 90-94% RA. RT following with Nebs. HIV, on medications.  Dialysis on Tues-Thurs-Sat. On IV Zosyn for PNA. ID following,see note. Hgb 8.3 yesterday. Transfuse less than 7.

## 2017-11-27 NOTE — PROGRESS NOTES
[facility]  Hospitalist Progress Note  Sherice Bernardo MD 11/27/2017    Reason for Stay (Diagnosis): pneumonia, anemia         Assessment and Plan:      Summary of Stay:   Donald Raza is a 67 year old male with pmhx of AIDs on HAART, ESRD on HD, HTN, CAD s/p stents, anemia, and thrombocytopenia who was admitted on 11/21/2017 with chest pain.  Found to have R sided pneumonia on CTPA and no PE.  Hgb was lower than baseline and down to 5.9 next day.  Received one unit RBCs for symptomatic anemia on 11/22.  Is on a small amount of oxygen here via NC.  Procalcitonin elevated.  Started on ceftriaxone/azithromycin for pneumonia.  Serial troponin negative and doubt ischemic etiology for pain.  Developed fevers and hypoxia while here.  Low CD4 count so ID consulted and broadened abx to vanco/zosyn on 11/23 along with azithromycin.  Nephrology consulted for HD.  Received additional 2 units RBCs on HD 11/23 (ok for non-irradiated products).      Problem List/Assessment and Plan:   Acute hypoxemic respiratory failure due to pneumonia, possible GNRs:  -- Presented with R sided chest pain and some sob. No fever initially or leukocytosis.    --Chest CT shows no PE, but does show a R sided infiltrate.    --Procalcitonin is elevated at > 2.  Viral load suppressed.  Last CD4 was 149.    --Is on dapsone hs for pcp prophylaxis presumably.    --O2 needed only on exertion. Off O2 at rest  --Initially on ctx/azithro but with  developed  fevers and hypoxia and ABx were broadened to Vanco, zosyn and Azithro  --ID consulted and following given low CD4 count and higher fevers  -- clinically better will defer ABX management to ID      Acute on chronic anemia:   -- presented with Hgb as low as 5.9 here.    -- has h/o Anemia of chronic disease with HG 8-9 baseline.  -- received 3 units PRBCs in hospital : 1 on 11/22, 2 units with HD on 11/23)   --hgb 7.9  --tranfuse if less than 7    --Epo with tranfusions    Chest pain  with significant CAD hx:   --hx of multiple stents and angiogram 10/2017 for chest pain that showed no obvious targets then to explain symptoms.    --R sided chest pain radiating to back likely  from his pneumonia  --Serial troponin all 0.02.    --pain management for now and defer further ischemic w/u as less likely cardiac    ESRD on HD: HD T,Th,Sat.  Last HD 11/23 full run.  L arm fistula.    --nephrology consulted  --routine HD while here    HTN: -150.  PTA regimen includes amlodipine 5mg daily, imdur 90mg daily, losartan 25mg daily, hydralazine prn.  -continued pta regimen    IDDM type II: pta on lantus 55U daily am and 15 U lispro tid with meals.    -INCREASE  dose lantus at 25U   -continue lower dose 5U aspart with meals TID and medium dose SSI  -further adjustments as necessary     Thrombocytopenia: plts 115k.  Baseline.  Monitor.    AIDs: last CD4 149 with suppressed viral load.  On HAART and dapsone.  -resume pta HAART and dapsone    DVT Prophylaxis: Pneumatic Compression Devices  Code Status: Full Code  FEN: dialysis diet  Discharge Dispo: home  Estimated Disch Date / # of Days until Disch: 1-2 days once off supplemental O2  On exertion and no further need for IV antibiotics        Interval History (Subjective):      Off o2 at rest needs O2 on exertion. Improved breathing and R sided chest pain. Review of all other systems negative                   Physical Exam:      Last Vital Signs:  /61 (BP Location: Right arm)  Pulse 79  Temp 96  F (35.6  C) (Axillary)  Resp 20  Wt 89.1 kg (196 lb 6.4 oz)  SpO2 93%  BMI 27.39 kg/m2    Intake/Output Summary (Last 24 hours) at 11/23/17 1411  Last data filed at 11/23/17 1300   Gross per 24 hour   Intake          1163.17 ml   Output             3500 ml   Net         -2336.83 ml     Constitutional: Awake, NAD  Eyes: sclera white   HEENT:  MMM  Respiratory: anteriorly clear.  No wheeze  Cardiovascular: RRR.  1/6 systolic murmur  GI: soft, non-tender, not  distended, bowel sounds present  Skin: no rash   Musculoskeletal/extremities:  No edema.  Palpable thrill and audible bruit L arm  Neurologic: A&O  Psychiatric: calm, cooperative         Medications:      All current medications were reviewed with changes reflected in problem list.         Data:      All new lab and imaging data was reviewed.   Labs:    Recent Labs  Lab 11/26/17  0650 11/25/17  0659 11/24/17  0628    131* 133   POTASSIUM 4.2 4.8 4.2   CHLORIDE 98 95 98   CO2 27 25 26   ANIONGAP 9 11 9   * 165* 165*   BUN 41* 61* 40*   CR 6.97* 9.46* 7.36*   GFRESTIMATED 8* 6* 7*   GFRESTBLACK 10* 7* 9*   PRABHA 9.2 9.2 8.8       Recent Labs  Lab 11/26/17  0650 11/25/17  0659 11/24/17  0628   WBC 4.4 5.3 6.2   HGB 8.3* 7.9* 8.2*   HCT 26.6* 24.4* 25.8*   MCV 91 92 93   * 117* 107*       Recent Labs  Lab 11/23/17  0250 11/22/17  2215 11/22/17  1900   TROPI 0.027 0.016 <0.015      Imaging:   None today      Sherice Bernardo MD

## 2017-11-28 VITALS
BODY MASS INDEX: 27.61 KG/M2 | HEART RATE: 84 BPM | SYSTOLIC BLOOD PRESSURE: 155 MMHG | WEIGHT: 197.97 LBS | OXYGEN SATURATION: 97 % | RESPIRATION RATE: 18 BRPM | TEMPERATURE: 97.9 F | DIASTOLIC BLOOD PRESSURE: 79 MMHG

## 2017-11-28 LAB
BACTERIA SPEC CULT: NO GROWTH
BACTERIA SPEC CULT: NO GROWTH
GLUCOSE BLDC GLUCOMTR-MCNC: 101 MG/DL (ref 70–99)
GLUCOSE BLDC GLUCOMTR-MCNC: 145 MG/DL (ref 70–99)
GLUCOSE BLDC GLUCOMTR-MCNC: 151 MG/DL (ref 70–99)
Lab: NORMAL
Lab: NORMAL
SPECIMEN SOURCE: NORMAL
SPECIMEN SOURCE: NORMAL

## 2017-11-28 PROCEDURE — A9270 NON-COVERED ITEM OR SERVICE: HCPCS | Mod: GY | Performed by: INTERNAL MEDICINE

## 2017-11-28 PROCEDURE — 94640 AIRWAY INHALATION TREATMENT: CPT | Mod: 76

## 2017-11-28 PROCEDURE — 90937 HEMODIALYSIS REPEATED EVAL: CPT

## 2017-11-28 PROCEDURE — 94640 AIRWAY INHALATION TREATMENT: CPT

## 2017-11-28 PROCEDURE — 40000275 ZZH STATISTIC RCP TIME EA 10 MIN

## 2017-11-28 PROCEDURE — 25000125 ZZHC RX 250: Performed by: HOSPITALIST

## 2017-11-28 PROCEDURE — 25000132 ZZH RX MED GY IP 250 OP 250 PS 637: Mod: GY | Performed by: INTERNAL MEDICINE

## 2017-11-28 PROCEDURE — 00000146 ZZHCL STATISTIC GLUCOSE BY METER IP

## 2017-11-28 PROCEDURE — 25000131 ZZH RX MED GY IP 250 OP 636 PS 637: Mod: GY | Performed by: INTERNAL MEDICINE

## 2017-11-28 PROCEDURE — 99239 HOSP IP/OBS DSCHRG MGMT >30: CPT | Performed by: INTERNAL MEDICINE

## 2017-11-28 PROCEDURE — 25000128 H RX IP 250 OP 636: Performed by: INTERNAL MEDICINE

## 2017-11-28 PROCEDURE — 63400005 ZZH RX 634: Performed by: INTERNAL MEDICINE

## 2017-11-28 RX ORDER — HEPARIN SODIUM 1000 [USP'U]/ML
500 INJECTION, SOLUTION INTRAVENOUS; SUBCUTANEOUS CONTINUOUS
Status: DISCONTINUED | OUTPATIENT
Start: 2017-11-28 | End: 2017-11-28

## 2017-11-28 RX ORDER — ALBUMIN, HUMAN INJ 5% 5 %
250 SOLUTION INTRAVENOUS
Status: DISCONTINUED | OUTPATIENT
Start: 2017-11-28 | End: 2017-11-28

## 2017-11-28 RX ORDER — AMOXICILLIN AND CLAVULANATE POTASSIUM 500; 125 MG/1; MG/1
1 TABLET, FILM COATED ORAL DAILY
Qty: 5 TABLET | Refills: 0 | Status: SHIPPED | OUTPATIENT
Start: 2017-11-28 | End: 2017-12-03

## 2017-11-28 RX ORDER — MYCOPHENOLATE MOFETIL 500 MG/1
500 TABLET ORAL 2 TIMES DAILY
Status: ON HOLD
Start: 2017-11-28 | End: 2018-09-01

## 2017-11-28 RX ORDER — HEPARIN SODIUM 1000 [USP'U]/ML
500 INJECTION, SOLUTION INTRAVENOUS; SUBCUTANEOUS
Status: COMPLETED | OUTPATIENT
Start: 2017-11-28 | End: 2017-11-28

## 2017-11-28 RX ADMIN — TAZOBACTAM SODIUM AND PIPERACILLIN SODIUM 2.25 G: 250; 2 INJECTION, SOLUTION INTRAVENOUS at 12:21

## 2017-11-28 RX ADMIN — HEPARIN SODIUM 500 UNITS: 1000 INJECTION, SOLUTION INTRAVENOUS; SUBCUTANEOUS at 08:24

## 2017-11-28 RX ADMIN — IPRATROPIUM BROMIDE AND ALBUTEROL SULFATE 3 ML: .5; 3 SOLUTION RESPIRATORY (INHALATION) at 12:01

## 2017-11-28 RX ADMIN — SODIUM CHLORIDE 250 ML: 9 INJECTION, SOLUTION INTRAVENOUS at 08:24

## 2017-11-28 RX ADMIN — CALCIUM CARBONATE (ANTACID) CHEW TAB 500 MG 1500 MG: 500 CHEW TAB at 12:13

## 2017-11-28 RX ADMIN — HEPARIN SODIUM 500 UNITS/HR: 1000 INJECTION, SOLUTION INTRAVENOUS; SUBCUTANEOUS at 08:24

## 2017-11-28 RX ADMIN — AMLODIPINE BESYLATE 5 MG: 5 TABLET ORAL at 12:12

## 2017-11-28 RX ADMIN — ERYTHROPOIETIN 15000 UNITS: 10000 INJECTION, SOLUTION INTRAVENOUS; SUBCUTANEOUS at 08:41

## 2017-11-28 RX ADMIN — SODIUM CHLORIDE 1000 ML: 9 INJECTION, SOLUTION INTRAVENOUS at 08:23

## 2017-11-28 RX ADMIN — INSULIN GLARGINE 25 UNITS: 100 INJECTION, SOLUTION SUBCUTANEOUS at 12:14

## 2017-11-28 RX ADMIN — TAZOBACTAM SODIUM AND PIPERACILLIN SODIUM 2.25 G: 250; 2 INJECTION, SOLUTION INTRAVENOUS at 03:31

## 2017-11-28 RX ADMIN — GABAPENTIN 300 MG: 300 CAPSULE ORAL at 12:13

## 2017-11-28 RX ADMIN — IPRATROPIUM BROMIDE AND ALBUTEROL SULFATE 3 ML: .5; 3 SOLUTION RESPIRATORY (INHALATION) at 08:13

## 2017-11-28 ASSESSMENT — ACTIVITIES OF DAILY LIVING (ADL)
ADLS_ACUITY_SCORE: 9

## 2017-11-28 NOTE — PROGRESS NOTES
[facility]  Infectious Disease Progress Note          Assessment and Plan:   IMPRESSION:   1.  A 67-year-old male very well-known to me through multiple hospitalizations, admitted with acute respiratory symptoms, found to have a degree of infiltrate, probably conventional pneumonia.  In the past he has had multiple episodes similar to this generally responding to conventional antibiotics, no positive cultures to date, but clinical worsening on ceftriaxone and Zithromax.  I think that is more likely due to a more resistant subhash of bacteria, possibly health care-associated than due to opportunistic infection.   2.  History of longstanding HIV infection with T-cells generally in the 140-200 range, most recently 149, has been on chronic prophylactic dapsone, relatively unlikely to have opportunistic infection despite the low T-cells.   3.  End-stage renal disease on dialysis, thus frequent Health Care contact.   4.  Multiple respiratory infections in the past, severe influenza earlier this year, prior to that at least one intubated severe pneumonia, some perception of possible aspiration or chronic lung disease in the past, but neither formally diagnosed.   5.  History of coronary artery disease.   6.  Sulfa allergy.       RECOMMENDATIONS:   1.  Completed Z-HELEN for atypicals.   2.   vancomycin on board and Zosyn, seems clearly better, sputum neg, Ok augmentin 500 daily for 5 days more and disposition   3  Continue HIV medications and dapsone on same regimen that has been successful at controlling virus previously., FU PN ID               Interval History:   no new complaints and doing better O2 OK wo O2 no cp, sob, n/v/d, or abd pain. resolved LGF, no new focal sxs, cxs neg              Medications:       insulin aspart  5 Units Subcutaneous TID w/meals     insulin glargine  25 Units Subcutaneous QAM AC     piperacillin-tazobactam  2.25 g Intravenous Q8H [facility]     insulin aspart  1-7 Units  Subcutaneous TID AC     insulin aspart  1-5 Units Subcutaneous At Bedtime     - MEDICATION INSTRUCTIONS for Dialysis Patients -   Does not apply See Admin Instructions     dapsone  100 mg Oral At Bedtime     ipratropium - albuterol 0.5 mg/2.5 mg/3 mL  3 mL Nebulization 4x daily     abacavir  600 mg Oral QPM     amLODIPine (NORVASC) tablet 5 mg  5 mg Oral Daily     atorvastatin  40 mg Oral At Bedtime     calcium carbonate  1,500 mg Oral TID     clopidogrel (PLAVIX) tablet 75 mg  75 mg Oral QPM     darunavir  800 mg Oral At Bedtime     dolutegravir  50 mg Oral At Bedtime     gabapentin  300 mg Oral QAM     gabapentin  600 mg Oral QPM     isosorbide mononitrate  90 mg Oral QPM     losartan (COZAAR) tablet 25 mg  25 mg Oral QPM     magnesium oxide (MAG-OX) tablet 400 mg  400 mg Oral At Bedtime     omeprazole  40 mg Oral Daily     ritonavir  100 mg Oral At Bedtime     sodium chloride (PF)  3 mL Intracatheter Q8H                  Physical Exam:   Blood pressure 155/80, pulse 84, temperature 96.6  F (35.9  C), temperature source Axillary, resp. rate 18, weight 89.8 kg (197 lb 15.6 oz), SpO2 92 %.  Wt Readings from Last 2 Encounters:   11/28/17 89.8 kg (197 lb 15.6 oz)   10/12/17 89.8 kg (197 lb 15.6 oz)     Vital Signs with Ranges  Temp:  [96.6  F (35.9  C)-98.6  F (37  C)] 96.6  F (35.9  C)  Pulse:  [84] 84  Heart Rate:  [70-83] 77  Resp:  [18-20] 18  BP: (106-165)/(44-82) 155/80  SpO2:  [90 %-99 %] 92 %    Constitutional: Awake, alert, cooperative, no apparent distress looks Ok on O2   Lungs: Clear to auscultation bilaterally, few crackles R sl  wheezing   Cardiovascular: Regular rate and rhythm, normal S1 and S2, and no murmur noted   Abdomen: Normal bowel sounds, soft, non-distended, non-tender   Skin: No rashes, no cyanosis, same edema   Other:           Data:   All microbiology laboratory data reviewed.  Recent Labs   Lab Test  11/26/17   0650  11/25/17   0659  11/24/17   0628   WBC  4.4  5.3  6.2   HGB  8.3*  7.9*   8.2*   HCT  26.6*  24.4*  25.8*   MCV  91  92  93   PLT  146*  117*  107*     Recent Labs   Lab Test  11/26/17   0650  11/25/17   0659  11/24/17   0628   CR  6.97*  9.46*  7.36*     No lab results found.  Recent Labs   Lab Test  11/25/17   0350  11/22/17 2000 11/22/17   1958  11/21/17   1921  11/21/17   1405  03/30/17   0800  03/29/17   0131  03/28/17   2044  03/28/17 2015   CULT  Culture negative monitoring continues  POS GRAM GNRF  Hold for Review    No growth  No growth  No growth  No growth  Light growth Normal subhash  Canceled, Test credited >10 Squamous epithelial cells/low power field indicates   oral contamination. Please recollect. Notification of test cancellation was   given to Jennifer Pacheco RN [id number] 03.29.17 CF  *  10,000 to 50,000 colonies/mL mixed urogenital subhash  Susceptibility testing not routinely done    No growth

## 2017-11-28 NOTE — PROGRESS NOTES
Potassium   Date Value Ref Range Status   11/26/2017 4.2 3.4 - 5.3 mmol/L Final   ]  Lab Results   Component Value Date    HGB 8.3 11/26/2017     Weight: 89.8 kg (197 lb 15.6 oz)    DIALYSIS PROCEDURE NOTE    Patient dialyzed for 3.5 hrs on a 3 K bath with a  net fluid removal of 3L.  A BFR of 450ml/min was obtained via LUAF and all connections secured.   Patient was seen by  during treatment.  Total heparin received during treatment:: 1750units.   Meds given:EPO. Complications:none   Procedure and ESRD teaching done and questions answered.  See flowsheet in EPIC for further details.  Hepatitis status confirmed on previous patient.    RO log completed  Prime given: NS  Saline double clamped and Arterial/Venous parameters set.   Patient transported via wc to the dialysis unit   Aseptic prep done for both on/off.  Transducer connectors checked q15 minutes with vital sign check  :  Report received from: FRANCHESCA Patel RN  Report given to: FRANCHESCA Patel RN  Outpatient Dialysis at  [place]    Hepatitis Antigen neg 10/24/17  Hepatitis Antibody immune 1/18/17

## 2017-11-28 NOTE — DISCHARGE SUMMARY
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, recurrent     Bradycardia 5/28/2016     CAD S/P percutaneous coronary angioplasty 6/15/2015     Chest pain      CKD (chronic kidney disease)     Hemodialysis     Dilated aortic root (H) 5/6/2016     ESRD (end stage renal disease) on dialysis (H) 5/6/2016     Hemodialysis-associated hypotension 5/22/2016     Human immunodeficiency virus (HIV) disease      Hypertension 2010     Hypotension, unspecified hypotension type 5/22/2016     Impotence of organic origin      Increased prostate specific antigen (PSA) velocity 08/08/2016    Awaiting bx on blood thinner     Mixed hyperlipidemia      Near syncope 2016    with hemodialysis     NSTEMI (non-ST elevated myocardial infarction) (H) 12/2015, 5/2016     Pulmonary HTN     Mod     TIA (transient ischemic attack) 5/2016     Type 2 diabetes mellitus (H) age 50     Unstable angina (H) 3/4/2016          History of Present Illness   Donald Raza is an 67 year old male who presented with shortness of breath and chest pain. Admitted for pneumonia.  Please see the admission history and physical for full details.    Hospital Course     Donald Raza was admitted on 11/21/2017. He  is a 67 year old male with pmhx of AIDs on HAART, ESRD on HD, HTN, CAD s/p stents, anemia, and thrombocytopenia who was admitted  with chest pain.  Found to have  R sided pneumonia on CTPA and no PE.  Hgb was lower than baseline and down to 5.9 next day.  Received one unit RBCs for symptomatic anemia on 11/22. Required supplemental O2,  Procalcitonin elevated.  Started on ceftriaxone/azithromycin for pneumonia. Serial Troponins were negative. Subsequently developed fevers and hypoxia with  Low CD4 count so ID consulted and broadened abx to vanco/zosyn on 11/23 along with azithromycin.  Nephrology consulted for HD.  Received additional 2 units RBCs on HD 11/23.      The following problems were addressed during his hospitalization:    Acute hypoxemic respiratory failure due to pneumonia, possible GNRs:  -- Presented with R sided chest pain and some sob. No fever initially or leukocytosis.    --Chest CT shows no PE, but does show a R sided infiltrate.    --Procalcitonin is elevated at > 2.  Viral load suppressed.  Last CD4 was 149.    --Is on dapsone hs for pcp prophylaxis presumably.    --O2 needed only on exertion. Off O2 at rest  --Initially on ctx/azithro but with  developed  fevers and hypoxia and ABx were broadened to Vanco, zosyn and Azithro  --ID consulted and following given low CD4 count and higher fevers  -- clinically improved and is discharged on oral Augmentin for additional  5 days upon discharge        Acute on chronic anemia:   -- presented with Hgb as low as 5.9 here.    -- has h/o Anemia of chronic disease with HG 8-9 baseline.  -- received 3 units PRBCs in hospital : 1 on 11/22, 2 units with HD on 11/23)   --hgb 8.3  -- received Epo with tranfusions     Chest pain without significant CAD hx:   --hx of multiple stents and angiogram 10/2017 for chest pain that showed no obvious targets then to explain symptoms.    --R sided chest pain radiating to back likely  from his pneumonia  --Serial troponin all 0.02.    --pain management for now and defer further ischemic w/u as less likely cardiac     ESRD on HD: HD T,Th,Sat.  Last HD 11/23 full run.  L arm fistula.     --nephrology consulted  --routine HD as inpatient     HTN: -150.    -- Continued on PTA regimen amlodipine 5mg daily, imdur 90mg daily, losartan 25mg daily, hydralazine prn.       IDDM type II: pta on lantus 55U daily am and 15 U lispro tid with meals.    -Will dc on 30 units. To be adjusted by pcp on discharge  -further adjustments as necessary as outpatient     Thrombocytopenia: plts 115k.  Baseline.  stable      AIDs: last CD4 149 with suppressed viral load.  On HAART and dapsone.  -resumed pta HAART and dapsone  - follow up as outpt    Significant Results and Procedures   As noted    Pending Results   Unresulted Labs Ordered in the Past 30 Days of this Admission     Date and Time Order Name Status Description    11/25/2017 0345 Sputum Culture Aerobic Bacterial Preliminary           Code Status   Full Code       Primary Care Physician   Aida Thomas    Physical Exam   Temp: 97.9  F (36.6  C) Temp src: Oral BP: 155/79 Pulse: 84 Heart Rate: 75 Resp: 18 SpO2: 97 % O2 Device: None (Room air) Oxygen Delivery: 1 LPM  Vitals:    11/26/17 0705 11/27/17 0611 11/28/17 0730   Weight: 88.7 kg (195 lb 9.6 oz) 89.1 kg (196 lb 6.4 oz) 89.8 kg (197 lb 15.6 oz)     Vital Signs with Ranges  Temp:  [96.6  F (35.9  C)-98.6  F (37  C)] 97.9  F (36.6  C)  Pulse:  [84] 84  Heart Rate:  [70-83] 75  Resp:  [18-20] 18  BP: (106-165)/(44-82) 155/79  SpO2:  [90 %-99 %] 97 %  I/O last 3 completed shifts:  In: 770 [P.O.:770]  Out: 150 [Urine:150]    Constitutional: Awake, NAD  Eyes: sclera white   HEENT:  MMM  Respiratory: anteriorly clear.  No wheeze  Cardiovascular: RRR.  1/6 systolic murmur  GI: soft, non-tender, not distended, bowel sounds present  Skin: no rash   Musculoskeletal/extremities:  No edema.  Palpable thrill and audible bruit L arm  Neurologic: A&O  Psychiatric: calm, cooperative    Discharge Disposition   Discharged to home    Consultations This Hospital Stay   NEPHROLOGY IP CONSULT  CARE COORDINATOR IP  CONSULT  INFECTIOUS DISEASES IP CONSULT  PHARMACY TO DOSE VANCO    Time Spent on this Encounter   I, Sherice Bernardo, personally saw the patient today and spent greater than 30 minutes discharging this patient.    Discharge Orders     Reason for your hospital stay   Please refer to discharge summary. Briefly admitted for shortness of breath treated for aspiration pneumonis     Follow-up and recommended labs and tests    Follow up with primary care provider, Aida Thomas, within 7 days for hospital follow- up.  The following labs/tests are recommended: blood glucose and CBC.      Insulin dose was reduced will need to adjust based on your diet upon discharge  Cellcept was held while in hospital secondary to infection. Can be resumed as out pt if OK per your physician based on clinical status and resolution of infection    Follow up with nephrology for dialysis     Activity   Your activity upon discharge: activity as tolerated     Monitor and record   blood glucose 4 times a day, before meals and at bedtime     Discharge Instructions   Please call or come if you have any new or worsening symptoms     Full Code     Diet   Follow this diet upon discharge: Orders Placed This Encounter     Dialysis Diet       Discharge Medications   Current Discharge Medication List      START taking these medications    Details   amoxicillin-clavulanate (AUGMENTIN) 875-125 MG per tablet Take 1 tablet by mouth 2 times daily for 5 days  Qty: 10 tablet, Refills: 0    Associated Diagnoses: Community acquired pneumonia, unspecified laterality         CONTINUE these medications which have CHANGED    Details   mycophenolate (GENERIC EQUIVALENT) 500 MG tablet Take 1 tablet (500 mg) by mouth 2 times daily On an empty stomach    Comments: HOLD until seen by pcp/ renal within a week  Associated Diagnoses: ESRD (end stage renal disease) on dialysis (H)      insulin glargine (LANTUS) 100 UNIT/ML injection Inject 30 Units Subcutaneous every  morning Take at 10 am    Associated Diagnoses: Type 2 diabetes mellitus with hyperosmolarity without coma, without long-term current use of insulin (H)         CONTINUE these medications which have NOT CHANGED    Details   AMLODIPINE BESYLATE PO Take 5 mg by mouth daily      Insulin Lispro (HUMALOG PEN SC) Take 15 units TID and an additional 2 units if BG is over 150      LOSARTAN POTASSIUM PO Take 25 mg by mouth every evening      calcium carbonate (TUMS) 500 MG chewable tablet Take 3 chew tab by mouth 3 times daily      isosorbide mononitrate (IMDUR) 30 MG 24 hr tablet Take 3 tablets (90 mg) by mouth every evening  Qty: 270 tablet, Refills: 1    Associated Diagnoses: Coronary artery disease involving native heart, angina presence unspecified, unspecified vessel or lesion type; Hypertension secondary to other renal disorders      B COMPLEX-C-FOLIC ACID PO Take 1 tablet by mouth At Bedtime       HYDRALAZINE HCL PO Take 25 mg by mouth 2 times daily as needed for high blood pressure      albuterol (PROAIR HFA/PROVENTIL HFA/VENTOLIN HFA) 108 (90 BASE) MCG/ACT Inhaler Inhale 2 puffs into the lungs every 6 hours as needed for shortness of breath / dyspnea or wheezing  Qty: 1 Inhaler, Refills: 1    Associated Diagnoses: Cough      Nutritional Supplements (NEPRO PO) 1-3 times daily      omeprazole (PRILOSEC) 40 MG capsule Take 40 mg by mouth daily 1 hour before dinner      !! gabapentin (NEURONTIN) 300 MG capsule Take 600 mg by mouth every evening      imiquimod (ALDARA) 5 % cream Apply topically as needed      DOXERCALCIFEROL IV Inject 6 mcg into the vein three times a week (with dialysis)      CLONAZEPAM PO Take 0.5 mg by mouth nightly as needed for anxiety (restless legs)      CLOPIDOGREL BISULFATE PO Take 75 mg by mouth every evening       abacavir (ZIAGEN) 300 MG tablet Take 600 mg by mouth every evening       chlorhexidine (CHLORHEXIDINE) 0.12 % solution Rinse and gargle 15 ml by mouth twice a day as directed.     "  terbinafine (LAMISIL AT) 1 % cream Apply topically 2 times daily as needed       atorvastatin (LIPITOR) 40 MG tablet Take 40 mg by mouth At Bedtime      epoetin brittni (EPOGEN,PROCRIT) 31844 UNIT/ML injection Inject 11,000 Units Subcutaneous three times a week WITH DIALYSIS      iron sucrose (VENOFER) 20 MG/ML injection Inject 50 mg into the vein once a week WIth dialysis      MAGNESIUM OXIDE PO Take 400 mg by mouth At Bedtime      dolutegravir (TIVICAY) 50 MG tablet Take 50 mg by mouth At Bedtime      nitroglycerin (NITROSTAT) 0.4 MG SL tablet One tablet under the tongue every 5 minutes if needed for chest pain. May repeat every 5 minutes for a maximum of 3 doses in 15 minutes\"  Qty: 25 tablet, Refills: 3    Associated Diagnoses: Coronary artery disease due to lipid rich plaque; Status post coronary angioplasty      !! gabapentin (NEURONTIN) 300 MG capsule Take 300 mg by mouth every morning       dapsone 100 MG tablet Take 100 mg by mouth At Bedtime       Darunavir Ethanolate (PREZISTA PO) Take 800 mg by mouth At Bedtime.      ritonavir (NORVIR) 100 MG capsule Take 1 capsule by mouth At Bedtime       order for DME Equipment being ordered: Other: 4WW  Treatment Diagnosis: Decreased activity tolerance  Qty: 1 each, Refills: 0    Associated Diagnoses: SOB (shortness of breath); Generalized muscle weakness       !! - Potential duplicate medications found. Please discuss with provider.        Allergies   Allergies   Allergen Reactions     Calcium Acetate Other (See Comments)     Other reaction(s): Other, see comments  Pain in chest and back  Pain in chest area (sensitive skin)      Diagnostic X-Ray Materials Other (See Comments)     PN: renal failure     Lisinopril      Sulfa Drugs      Data   Most Recent 3 CBC's:  Recent Labs   Lab Test  11/26/17   0650  11/25/17   0659  11/24/17   0628   WBC  4.4  5.3  6.2   HGB  8.3*  7.9*  8.2*   MCV  91  92  93   PLT  146*  117*  107*      Most Recent 3 BMP's:  Recent Labs   Lab " Test  11/26/17   0650  11/25/17   0659  11/24/17   0628   NA  134  131*  133   POTASSIUM  4.2  4.8  4.2   CHLORIDE  98  95  98   CO2  27  25  26   BUN  41*  61*  40*   CR  6.97*  9.46*  7.36*   ANIONGAP  9  11  9   PRABHA  9.2  9.2  8.8   GLC  171*  165*  165*     Most Recent 2 LFT's:  Recent Labs   Lab Test  09/20/17   1211  08/09/17   0818   04/15/17   0819   AST  23   --    --   20   ALT  23  22   < >  22   ALKPHOS  99   --    --   77   BILITOTAL  0.6   --    --   0.4    < > = values in this interval not displayed.     Most Recent INR's and Anticoagulation Dosing History:  Anticoagulation Dose History     Recent Dosing and Labs Latest Ref Rng & Units 8/15/2016 1/8/2017 4/15/2017 9/19/2017 9/20/2017 10/10/2017 11/21/2017    INR 0.86 - 1.14 0.99 1.41(H) 1.09 0.94 1.01 0.95 0.97        Most Recent 3 Troponin's:  Recent Labs   Lab Test  11/23/17   0250  11/22/17   2215  11/22/17   1900   04/15/17   0820   05/19/16   0951   TROPI  0.027  0.016  <0.015   < >   --    < >   --    TROPONIN   --    --    --    --   0.02   --   0.02    < > = values in this interval not displayed.     Most Recent Cholesterol Panel:  Recent Labs   Lab Test  08/09/17   0818   CHOL  125   LDL  30   HDL  33*   TRIG  311*     Most Recent 6 Bacteria Isolates From Any Culture (See EPIC Reports for Culture Details):  Recent Labs   Lab Test  11/25/17   0350  11/22/17 2000 11/22/17   1958  11/21/17   1921  11/21/17   1405  03/30/17   0800   CULT  Culture negative monitoring continues  POS GRAM GNRF  Hold for Review    No growth  No growth  No growth  No growth  Light growth Normal subhash     Most Recent TSH, T4 and A1c Labs:  Recent Labs   Lab Test  10/10/17   2225  09/20/17   1211   TSH  2.19   --    A1C   --   4.9     Results for orders placed or performed during the hospital encounter of 11/21/17   XR Chest 2 Views    Narrative    XR CHEST 2 VW 11/21/2017 2:43 PM    HISTORY: chest pain, dyspnea;       Impression    IMPRESSION: There are few small  faint nodular densities in the right  upper lobe not seen on the previous exam from 10/10/2017. This could  represent some subtle infiltrate. Short-term follow-up recommended.    VESNA LEDESMA MD   Chest CT, IV contrast only - PE protocol    Narrative    CT CHEST PULMONARY EMBOLISM WITH CONTRAST 11/21/2017 5:55 PM      HISTORY: Chest pain, dyspnea, elevated d-dimer.    TECHNIQUE: CT chest with intravenous contrast using the pulmonary  embolus protocol. Radiation dose for this scan was reduced using  automated exposure control, adjustment of the mA and/or kV according  to patient size, or iterative reconstruction technique. 71 mL  Isovue-370.    COMPARISON: 8/16/2016.    FINDINGS: Evaluation of the pulmonary arterial system shows no  evidence of embolus. The thoracic aorta is calcified. No aortic  aneurysm or dissection. There are coronary artery atherosclerotic  calcifications. The heart is enlarged. There are a few borderline and  mildly enlarged mediastinal and right hilar lymph nodes. A pretracheal  node on image #46 measures 1.3 x 1.4 cm. There is emphysematous  disease in the upper lungs. There is a patchy infiltrate in the right  upper lobe. Minimal infiltrate in the right lower lobe. Dependent  atelectasis bilaterally. No pneumothorax or pleural effusion. Images  through the upper abdomen show no acute abnormality.      Impression    IMPRESSION:  1. There is no pulmonary embolus, aortic aneurysm or dissection.  2. Right upper lobe pneumonia. Probable minimal right lower lobe  pneumonia.   3. A few borderline and mildly enlarged right hilar and mediastinal  lymph nodes may be reactive.   4. Coronary artery atherosclerotic calcifications and cardiomegaly.    LIGIA RIVERO MD             Disclaimer: This note consists of symbols derived from keyboarding, dictation and/or voice recognition software. As a result, there may be errors in the script that have gone undetected. Please consider this when interpreting  information found in this chart.

## 2017-11-28 NOTE — PLAN OF CARE
Problem: Cardiac: ACS (Acute Coronary Syndrome) (Adult)  Goal: Signs and Symptoms of Listed Potential Problems Will be Absent, Minimized or Managed (Cardiac: ACS)  Signs and symptoms of listed potential problems will be absent, minimized or managed by discharge/transition of care (reference Cardiac: ACS (Acute Coronary Syndrome) (Adult) CPG).   Outcome: No Change  2300-0730: Denies CP, SOB. VSS afebrile, on oxygen at 1L per NC, sats mid 90's. Tele SR HR 80's. Lung sounds diminished. Cont on Zosyn for PNA. . PIV SL to R lower arm. Cr 6.97, Hgb 8.3. Plan for HD today. ID and Neph following. Up with SBA. Resting comfortably in between cares. Will cont with POC.

## 2017-11-28 NOTE — PLAN OF CARE
Problem: Pneumonia (Adult)  Goal: Signs and Symptoms of Listed Potential Problems Will be Absent, Minimized or Managed (Pneumonia)  Signs and symptoms of listed potential problems will be absent, minimized or managed by discharge/transition of care (reference Pneumonia (Adult) CPG).   Outcome: Improving  VS elevated at times, on BP meds. Dialysis Tues-Thurs-Sat. Dialysis this morning. O2 sats improving. 92-96% RA this morning. On Nebs and IV Zosyn, ID following, see note. Plan for Dc today. Went over dc paperwork with pt. Questions asked and answered. Pt verbalized understanding. Pt dc'd at 1439 with all belongings, paperwork and med.

## 2017-11-28 NOTE — PLAN OF CARE
Problem: Pneumonia (Adult)  Goal: Signs and Symptoms of Listed Potential Problems Will be Absent, Minimized or Managed (Pneumonia)  Signs and symptoms of listed potential problems will be absent, minimized or managed by discharge/transition of care (reference Pneumonia (Adult) CPG).   Outcome: No Change  A/Ox4. Pt. O2 sats dropped to 89% on RA during shift. Put back on 2 LPM NC. PRN tylenol given for L leg pain. Lung sounds dim with audible wheezing. RT paged for neb. Fistula in L arm, bruit present. Will get dialysis in am. HIV positive, on medications. On IV zosyn for PNA. Denies chest pain. Numbness and tingling present to BLE (toes). Will continue to monitor.     Pt. Complaining of chest pain, rating it a 6. Nitro given, brought pain down to 4. 92% on 1LPM.   Denies c/p 30 minutes after 1 nitro given.

## 2017-11-28 NOTE — PROGRESS NOTES
Inpatient Dialysis Progress Note        Assessment and Plan:     # ESRD  # HIV  # PNA  # Anemia  # CAD  # HTN         Interval History:     Pt says he feels a lot better. His cough and breathing are better.          Dialysis Parameters:     Vitals:    11/26/17 0705 11/27/17 0611 11/28/17 0730   Weight: 88.7 kg (195 lb 9.6 oz) 89.1 kg (196 lb 6.4 oz) 89.8 kg (197 lb 15.6 oz)     I/O last 3 completed shifts:  In: 770 [P.O.:770]  Out: 150 [Urine:150]  Temp:  [96.6  F (35.9  C)-98.6  F (37  C)] 97.9  F (36.6  C)  Pulse:  [84] 84  Heart Rate:  [70-83] 77  Resp:  [18-20] 18  BP: (106-165)/(44-82) 155/72  SpO2:  [90 %-99 %] 92 %    Current Weight: 89.8  Dry Weight: 88.5  Dialysis Temp: 36.5  C  Access Device: AVF  Access Site: L arm  Dialyzer: revaclear  Dialysis Bath: 3K  Sodium Profile: none  UF Goal: 3 liters net  Blood Flow Rate (mL/min): 450  Total Treatment Time (hrs): 3.5  Heparin: Low dose     Review of Systems:        Medications:     EPO dose: 68632       Physical Exam:     Vitals were reviewed  Patient Vitals for the past 24 hrs:   BP Temp Temp src Pulse Heart Rate Resp SpO2 Weight   11/28/17 1115 155/72 97.9  F (36.6  C) Oral - 77 - - -   11/28/17 1100 162/82 - - - 76 18 92 % -   11/28/17 1045 138/76 - - - 76 - - -   11/28/17 1030 155/80 - - - 77 - 92 % -   11/28/17 1015 158/80 - - - 77 - - -   11/28/17 1000 154/78 - - - 74 - 95 % -   11/28/17 0945 157/70 - - - 76 - - -   11/28/17 0930 159/78 - - - 74 18 93 % -   11/28/17 0915 106/44 - - - 76 - - -   11/28/17 0900 144/78 - - - 74 - 92 % -   11/28/17 0845 161/82 - - - 73 - - -   11/28/17 0830 154/76 - - - 74 - 94 % -   11/28/17 0815 142/70 - - - 70 - - -   11/28/17 0813 - - - - - - 99 % -   11/28/17 0800 147/73 - - - 72 - - -   11/28/17 0745 145/73 - - - 74 18 92 % -   11/28/17 0732 147/67 96.6  F (35.9  C) Axillary - 78 20 92 % -   11/28/17 0730 - - - - - - - 89.8 kg (197 lb 15.6 oz)   11/28/17 0500 - - - - - - 94 % -   11/28/17 0000 149/70 98.6  F (37  C)  Oral 84 - 20 93 % -   17 2317 - - - - - - 94 % -   17 2155 152/67 - - - 83 - 93 % -   17 2139 163/74 - - - 83 20 95 % -   17 1922 165/73 - - - 74 - 93 % -   17 1558 142/65 96.6  F (35.9  C) Axillary - 76 18 91 % -   17 1534 - - - - 75 18 92 % -   17 1408 - - - - - - 90 % -       Temperatures:  Current - Temp: 97.9  F (36.6  C); Max - Temp  Av.4  F (36.3  C)  Min: 96.6  F (35.9  C)  Max: 98.6  F (37  C)  Respiration range: Resp  Av.8  Min: 18  Max: 20  Pulse range: Pulse  Av  Min: 84  Max: 84  Blood pressure range: Systolic (24hrs), Av , Min:106 , Max:165   ; Diastolic (24hrs), Av, Min:44, Max:82    Pulse oximetry range: SpO2  Av.1 %  Min: 90 %  Max: 99 %  I/O last 3 completed shifts:  In: 770 [P.O.:770]  Out: 150 [Urine:150]    Intake/Output Summary (Last 24 hours) at 17 1134  Last data filed at 17 2320   Gross per 24 hour   Intake              500 ml   Output              125 ml   Net              375 ml       Gen:NAD. Pleasant.  CV: RRR, + murmur  Pulm: No wheezes or crackles  Abd: soft, NT  Ext: no edema  Neuro: a/o x3          Data:     Recent Labs   Lab Test  17   0650  17   0659   NA  134  131*   POTASSIUM  4.2  4.8   CHLORIDE  98  95   CO2  27  25   ANIONGAP  9  11   GLC  171*  165*   BUN  41*  61*   CR  6.97*  9.46*   PRABHA  9.2  9.2       Hemoglobin   Date Value Ref Range Status   2017 8.3 (L) 13.3 - 17.7 g/dL Final              Doyle Marcial MD

## 2017-11-29 NOTE — PROGRESS NOTES
Transition Communication Hand-off for Care Transitions to Next Level of Care Provider    Name: Donald Raza  MRN #: [id number]  Primary Care Provider: Aida Thomas  Primary Care MD Name: Dr Thomas  Primary Clinic: [facility] [address] [place] [id number]  Primary Care Clinic Name: [facility]  Reason for Hospitalization:  Anemia [D64.9]  Admit Date/Time: 11/21/2017  1:26 PM  Discharge Date: 11/28/17  Payor Source: Payor: MEDICARE / Plan: MEDICARE / Product Type: Medicare /     Readmission Assessment Measure (MUSHTAQ) Risk Score/category: ELEVATED           Reason for Communication Hand-off Referral: Admission diagnoses: PN  Fragility  Multiple providers/specialties    Discharge Plan:       Concern for non-adherence with plan of care:   no  Discharge Needs Assessment:  Needs       Most Recent Value    Transportation Available car, family or friend will provide          Already enrolled in Tele-monitoring program and name of program:  na  Follow-up specialty is recommended: Yes    Follow-up plan:  Future Appointments  Date Time Provider Department Center   12/1/2017 8:00 AM Juany Hinojosa, APRN CNP [facility]       Any outstanding tests or procedures:             Follow-up and recommended labs and tests        Follow up with primary care provider, Aida Thomas, within 7 days for hospital follow- up.  The following labs/tests are recommended: blood glucose and CBC.       Insulin dose was reduced will need to adjust based on your diet upon discharge   Cellcept was held while in hospital secondary to infection. Can be resumed as out pt if OK per your physician based on clinical status and resolution of infection     Follow up with nephrology for dialysis                Key Recommendations:  Pt was admitted with PNA. This is his 6th admit this year and he has an elevated MUSHTAQ score. Pt lives at home with his wife, brother and daughter. His daughter is his PCA. He is  currently being treated with oxygen and antibiotics. He does not anticipate any dc needs. He will cont his MWF dialysis plan. Pt and wife will make their own follow up appts.    Karen Botello    AVS/Discharge Summary is the source of truth; this is a helpful guide for improved communication of patient story

## 2017-11-30 LAB
BACTERIA SPEC CULT: NO GROWTH
SPECIMEN SOURCE: NORMAL

## 2017-12-01 ENCOUNTER — OFFICE VISIT (OUTPATIENT)
Dept: CARDIOLOGY | Facility: CLINIC | Age: 67
End: 2017-12-01
Attending: INTERNAL MEDICINE
Payer: MEDICARE

## 2017-12-01 VITALS
HEIGHT: 71 IN | HEART RATE: 72 BPM | SYSTOLIC BLOOD PRESSURE: 162 MMHG | BODY MASS INDEX: 27.56 KG/M2 | DIASTOLIC BLOOD PRESSURE: 74 MMHG | WEIGHT: 196.9 LBS

## 2017-12-01 DIAGNOSIS — E11.22 TYPE 2 DIABETES MELLITUS WITH CHRONIC KIDNEY DISEASE ON CHRONIC DIALYSIS, UNSPECIFIED LONG TERM INSULIN USE STATUS: Chronic | ICD-10-CM

## 2017-12-01 DIAGNOSIS — N18.6 TYPE 2 DIABETES MELLITUS WITH CHRONIC KIDNEY DISEASE ON CHRONIC DIALYSIS, UNSPECIFIED LONG TERM INSULIN USE STATUS: Chronic | ICD-10-CM

## 2017-12-01 DIAGNOSIS — Z99.2 TYPE 2 DIABETES MELLITUS WITH CHRONIC KIDNEY DISEASE ON CHRONIC DIALYSIS, UNSPECIFIED LONG TERM INSULIN USE STATUS: Chronic | ICD-10-CM

## 2017-12-01 DIAGNOSIS — Z86.79 HISTORY OF CORONARY ARTERY DISEASE: ICD-10-CM

## 2017-12-01 PROCEDURE — 99214 OFFICE O/P EST MOD 30 MIN: CPT | Performed by: NURSE PRACTITIONER

## 2017-12-01 NOTE — LETTER
12/1/2017      Cristina Ann Baker, MD [facility] [address] [facility] [address]      RE: Donald SABINE Raza       Dear Colleague,    I had the pleasure of seeing Donald Raza in the [facility].    HISTORY OF PRESENT ILLNESS:  Mr. Raza is a 67-year-old gentleman who I am seeing in followup after a recent hospitalization in 10/2017 for chest pain.  This gentleman has a history of coronary artery disease with several non-ST elevation myocardial infarctions with percutaneous intervention.  In 06/2015, he had a mid-LAD PCI procedure.  At that time, he had an inferior branch diagonal with obstructive disease which is medically managed.  Subsequently, in 12/2015, he had another non-ST elevation myocardial infarction and was taken back to the Cath Lab and was found to have a tight ostial diagonal stenosis that was treated with percutaneous intervention and his LAD was dilated with the balloon in a kissing fashion.  In 05/2016, he suffered another non-ST elevation myocardial infarction and was found to have an in-stent restenosis of the diagonal and underwent a cutting balloon angioplasty at that time.  In 08/2017, he had another ST elevation myocardial infarction and was found to have a diagonal in-stent restenosis, was dilated and then stented with a 3 x 8 Xience alpine stent.      In 01/2017, he had another non-ST elevation myocardial infarction and underwent a repeat coronary angiogram that showed his stents were patent.  Then more recently in October, he was admitted with chest pain.  Again, he was taken back to the Cath Lab, which demonstrated patent LAD stents.  The ostial of the diagonal branch had a stenosis that was negative by FFR.  There were already 2 layers of stents there, and given the negative FFR, it was felt it was unlikely to be the cause of his resting chest pain.  If needed in the future, consideration for bifurcation PCI if the symptoms warranted.  Donald  tells me today that he was back into the hospital for pneumonia and was discharged on 2017.  His past medical history is complicated by the followin.  End-stage renal disease, on hemodialysis.   2.  HIV positive.   3.  Hypertension.   4.  Dyslipidemia, with significant hypertriglyceridemia.   5.  Diabetes.      Today he presents with elevated blood pressure.  With 2 recent hospitalizations, he has undergone some medication changes.  Today we reviewed his current medication list from his most recent hospitalization.  Currently, he is on the following cardiac medications:   1.  Amlodipine 5 mg per day.   2.  Losartan 25 mg each day at bedtime.   3.  Isosorbide mononitrate 90 mg each day at bedtime.   4.  Hydralazine 25 mg twice daily as needed for high blood pressure.   5.  Plavix 75 mg q. day.      To note, he is not on an aspirin.  I am assuming this is from his dialysis or his history of a major bleed.      LABWORK REVIEWED:  His cholesterol was checked in August.  His total cholesterol was 125, HDL was 33, LDL 30, triglycerides 311.  Hemoglobin on 2017 was 8.3.  With his last hospitalization, his hemoglobin actually got down to 6.7.  He received 3 units of packed red blood cells in the hospital.  He also received EPO with transfusions.      PHYSICAL EXAMINATION:   VITAL SIGNS:  Blood pressure in our office today was elevated at 162/74, pulse is 72, weight today is 196 pounds, BMI is 27.52.   HEART:  Tones are regular with an S1, S2.  No murmur, no S3, S4.   ABDOMEN:  Soft, nontender.   NECK:  No thyromegaly.   LUNGS:  Clear without crackles or wheezes.  Bilateral normal breath sounds.   VASCULAR:  He has a palpable left brachial AV fistula.   EXTREMITIES:  Lower extremities have normal muscle tone and strength.   LOWER EXTREMITIES:  No edema, clubbing or deformities.      IMPRESSION AND PLAN:   1.  Patient with recent hospitalization for noncardiac chest pain with known coronary artery disease,  status post multiple previous stents with in-stent restenosis, with a recent coronary angiogram done in October that showed no significant disease with mild in-stent restenosis of the LAD.  The patient appears stable today without any new symptoms of angina, heart failure or arrhythmias.   2.  End-stage renal disease.  He is on dialysis via left AV fistula.   3.  Hypertension with labile blood pressures.  His blood pressure is elevated today.  I have recommended we increase his amlodipine to 10 mg per day.   4.  Dyslipidemia.  His LDL is at goal.  He remains with elevated triglycerides.  To note, the patient is not on an aspirin.  I am assuming maybe this has to do with his dialysis.  Will pose the question to his primary cardiologist, but according to the patient, he has never been on an aspirin, but I do see it as part of his history.      It has been my pleasure to meet this gentleman.     Outpatient Encounter Prescriptions as of 2017   Medication Sig Dispense Refill     mycophenolate (GENERIC EQUIVALENT) 500 MG tablet Take 1 tablet (500 mg) by mouth 2 times daily On an empty stomach       [] amoxicillin-clavulanate (AUGMENTIN) 500-125 MG per tablet Take 1 tablet by mouth daily for 5 days 5 tablet 0     [DISCONTINUED] insulin glargine (LANTUS) 100 UNIT/ML injection Inject 30 Units Subcutaneous every morning Take at 10 am       AMLODIPINE BESYLATE PO Take 10 mg by mouth daily       Insulin Lispro (HUMALOG PEN SC) Take 15 units TID and an additional 2 units if BG is over 150       calcium carbonate (TUMS) 500 MG chewable tablet Take 3 chew tab by mouth 3 times daily       [DISCONTINUED] LOSARTAN POTASSIUM PO Take 25 mg by mouth every evening       [DISCONTINUED] isosorbide mononitrate (IMDUR) 30 MG 24 hr tablet Take 3 tablets (90 mg) by mouth every evening 270 tablet 1     B COMPLEX-C-FOLIC ACID PO Take 1 tablet by mouth At Bedtime        [DISCONTINUED] HYDRALAZINE HCL PO Take 25 mg by mouth 2 times  "daily as needed for high blood pressure       albuterol (PROAIR HFA/PROVENTIL HFA/VENTOLIN HFA) 108 (90 BASE) MCG/ACT Inhaler Inhale 2 puffs into the lungs every 6 hours as needed for shortness of breath / dyspnea or wheezing 1 Inhaler 1     Nutritional Supplements (NEPRO PO) 1-3 times daily       omeprazole (PRILOSEC) 40 MG capsule Take 40 mg by mouth daily 1 hour before dinner       order for DME Equipment being ordered: Other: 4WW  Treatment Diagnosis: Decreased activity tolerance 1 each 0     gabapentin (NEURONTIN) 300 MG capsule Take 600 mg by mouth every evening       imiquimod (ALDARA) 5 % cream Apply topically as needed       DOXERCALCIFEROL IV Inject 6 mcg into the vein three times a week (with dialysis)       CLONAZEPAM PO Take 0.5 mg by mouth nightly as needed for anxiety (restless legs)       CLOPIDOGREL BISULFATE PO Take 75 mg by mouth every evening        abacavir (ZIAGEN) 300 MG tablet Take 600 mg by mouth every evening        chlorhexidine (CHLORHEXIDINE) 0.12 % solution Rinse and gargle 15 ml by mouth twice a day as directed.       terbinafine (LAMISIL AT) 1 % cream Apply topically 2 times daily as needed        atorvastatin (LIPITOR) 40 MG tablet Take 40 mg by mouth At Bedtime       epoetin brittni (EPOGEN,PROCRIT) 79738 UNIT/ML injection Inject 11,000 Units Subcutaneous three times a week WITH DIALYSIS       iron sucrose (VENOFER) 20 MG/ML injection Inject 50 mg into the vein once a week WIth dialysis       MAGNESIUM OXIDE PO Take 400 mg by mouth At Bedtime       dolutegravir (TIVICAY) 50 MG tablet Take 50 mg by mouth At Bedtime       nitroglycerin (NITROSTAT) 0.4 MG SL tablet One tablet under the tongue every 5 minutes if needed for chest pain. May repeat every 5 minutes for a maximum of 3 doses in 15 minutes\" 25 tablet 3     gabapentin (NEURONTIN) 300 MG capsule Take 300 mg by mouth every morning        dapsone 100 MG tablet Take 100 mg by mouth At Bedtime        Darunavir Ethanolate (PREZISTA PO) " Take 800 mg by mouth At Bedtime.       ritonavir (NORVIR) 100 MG capsule Take 1 capsule by mouth At Bedtime        No facility-administered encounter medications on file as of 12/1/2017.        Again, thank you for allowing me to participate in the care of your patient.      Sincerely,    DEDRICK Ortiz [facility]

## 2017-12-01 NOTE — MR AVS SNAPSHOT
After Visit Summary   12/1/2017    Donald Rzaa    MRN: [id number]           Patient Information     Date Of Birth          1950        Visit Information        Provider Department      12/1/2017 8:00 AM Juany Hinojosa APRN CNP [facility]        Today's Diagnoses     History of coronary artery disease        Type 2 diabetes mellitus with chronic kidney disease on chronic dialysis, unspecified long term insulin use status (H)          Care Instructions    For your high blood pressure:  Increase the amlodipine to 10 mg per day-     See Dr. Diaz in 1/29/18    Continue with the plavix    Get back into your walking routine          Follow-ups after your visit        Your next 10 appointments already scheduled     Jan 29, 2018  9:45 AM CST   Return Visit with Arnoldo Diaz MD   [facility] ([facility])    [address] [place] [id number] [phone]              Who to contact     If you have questions or need follow up information about today's clinic visit or your schedule please contact [facility] directly at [phone].  Normal or non-critical lab and imaging results will be communicated to you by [employer], letter or phone within 4 business days after the clinic has received the results. If you do not hear from us within 7 days, please contact the clinic through [employer] or phone. If you have a critical or abnormal lab result, we will notify you by phone as soon as possible.  Submit refill requests through [employer] or call your pharmacy and they will forward the refill request to us. Please allow 3 business days for your refill to be completed.          Additional Information About Your Visit        [employer] Information     [employer] lets you send messages to your doctor, view your test results, renew your prescriptions, schedule appointments  "and more. To sign up, go to [email or web address] . Click on \"Log in\" on the left side of the screen, which will take you to the Welcome page. Then click on \"Sign up Now\" on the right side of the page.     You will be asked to enter the access code listed below, as well as some personal information. Please follow the directions to create your username and password.     Your access code is: [email or web address]  Expires: 2018  1:13 PM     Your access code will  in 90 days. If you need help or a new code, please call your [place] clinic or [phone].        Care EveryWhere ID     This is your Care EveryWhere ID. This could be used by other organizations to access your [facility] medical records  [id number]        Your Vitals Were     Pulse Height BMI (Body Mass Index)             72 1.803 m (5' 11\") 27.46 kg/m2          Blood Pressure from Last 3 Encounters:   17 162/74   17 155/79   10/12/17 156/79    Weight from Last 3 Encounters:   17 89.3 kg (196 lb 14.4 oz)   17 89.8 kg (197 lb 15.6 oz)   10/12/17 89.8 kg (197 lb 15.6 oz)              We Performed the Following     Follow-Up with Cardiac Advanced Practice Provider        Primary Care Provider Office Phone # Fax #    Aida Thomas [phone]       [facility] [address] [place] [address]        Equal Access to Services     [facility]: Hadii aad ku hadasho Soomaali, waaxda luqadaha, qaybta kaalmada adeegmesha, fatoumata culver . So [facility] [phone].    ATENCIÓN: Si habla español, tiene a ayala disposición servicios gratuitos de asistencia lingüística. Llame al [phone].    We comply with applicable federal civil rights laws and [place] laws. We do not discriminate on the basis of race, color, national origin, age, disability, sex, sexual orientation, or gender identity.            Thank you!     Thank you for choosing [facility]   " EM[facility]  for your care. Our goal is always to provide you with excellent care. Hearing back from our patients is one way we can continue to improve our services. Please take a few minutes to complete the written survey that you may receive in the mail after your visit with us. Thank you!             Your Updated Medication List - Protect others around you: Learn how to safely use, store and throw away your medicines at www.disposemymeds.org.          This list is accurate as of: 12/1/17  8:34 AM.  Always use your most recent med list.                   Brand Name Dispense Instructions for use Diagnosis    abacavir 300 MG tablet    ZIAGEN     Take 600 mg by mouth every evening        albuterol 108 (90 BASE) MCG/ACT Inhaler    PROAIR HFA/PROVENTIL HFA/VENTOLIN HFA    1 Inhaler    Inhale 2 puffs into the lungs every 6 hours as needed for shortness of breath / dyspnea or wheezing    Cough       AMLODIPINE BESYLATE PO      Take 10 mg by mouth daily        amoxicillin-clavulanate 500-125 MG per tablet    AUGMENTIN    5 tablet    Take 1 tablet by mouth daily for 5 days    Community acquired pneumonia, unspecified laterality       atorvastatin 40 MG tablet    LIPITOR     Take 40 mg by mouth At Bedtime        B COMPLEX-C-FOLIC ACID PO      Take 1 tablet by mouth At Bedtime        calcium carbonate 500 MG chewable tablet    TUMS     Take 3 chew tab by mouth 3 times daily        chlorhexidine 0.12 % solution    PERIDEX     Rinse and gargle 15 ml by mouth twice a day as directed.        CLONAZEPAM PO      Take 0.5 mg by mouth nightly as needed for anxiety (restless legs)        CLOPIDOGREL BISULFATE PO      Take 75 mg by mouth every evening        dapsone 100 MG tablet      Take 100 mg by mouth At Bedtime        dolutegravir 50 MG tablet    TIVICAY     Take 50 mg by mouth At Bedtime        DOXERCALCIFEROL IV      Inject 6 mcg into the vein three times a week (with dialysis)        epoetin brittni 70326 UNIT/ML injection     "EPOGEN/PROCRIT     Inject 11,000 Units Subcutaneous three times a week WITH DIALYSIS        * gabapentin 300 MG capsule    NEURONTIN     Take 300 mg by mouth every morning        * gabapentin 300 MG capsule    NEURONTIN     Take 600 mg by mouth every evening        HUMALOG PEN SC      Take 15 units TID and an additional 2 units if BG is over 150        HYDRALAZINE HCL PO      Take 25 mg by mouth 2 times daily as needed for high blood pressure        imiquimod 5 % cream    ALDARA     Apply topically as needed        insulin glargine 100 UNIT/ML injection    LANTUS     Inject 30 Units Subcutaneous every morning Take at 10 am    Type 2 diabetes mellitus with hyperosmolarity without coma, without long-term current use of insulin (H)       iron sucrose 20 MG/ML injection    VENOFER     Inject 50 mg into the vein once a week WIth dialysis        isosorbide mononitrate 30 MG 24 hr tablet    IMDUR    270 tablet    Take 3 tablets (90 mg) by mouth every evening    Coronary artery disease involving native heart, angina presence unspecified, unspecified vessel or lesion type, Hypertension secondary to other renal disorders       lamISIL AT 1 % cream   Generic drug:  terbinafine      Apply topically 2 times daily as needed        LOSARTAN POTASSIUM PO      Take 25 mg by mouth every evening        MAGNESIUM OXIDE PO      Take 400 mg by mouth At Bedtime        mycophenolate 500 MG tablet    GENERIC EQUIVALENT     Take 1 tablet (500 mg) by mouth 2 times daily On an empty stomach    ESRD (end stage renal disease) on dialysis (H)       NEPRO PO      1-3 times daily        nitroGLYcerin 0.4 MG sublingual tablet    NITROSTAT    25 tablet    One tablet under the tongue every 5 minutes if needed for chest pain. May repeat every 5 minutes for a maximum of 3 doses in 15 minutes\"    Coronary artery disease due to lipid rich plaque, Status post coronary angioplasty       order for DME     1 each    Equipment being ordered: Other: 4WW " Treatment Diagnosis: Decreased activity tolerance    SOB (shortness of breath), Generalized muscle weakness       PREZISTA PO      Take 800 mg by mouth At Bedtime.        priLOSEC 40 MG capsule   Generic drug:  omeprazole      Take 40 mg by mouth daily 1 hour before dinner        ritonavir 100 MG capsule    NORVIR     Take 1 capsule by mouth At Bedtime        * Notice:  This list has 2 medication(s) that are the same as other medications prescribed for you. Read the directions carefully, and ask your doctor or other care provider to review them with you.

## 2017-12-01 NOTE — PATIENT INSTRUCTIONS
For your high blood pressure:  Increase the amlodipine to 10 mg per day-     See Dr. Diaz in 1/29/18    Continue with the plavix    Get back into your walking routine

## 2017-12-01 NOTE — PROGRESS NOTES
HISTORY OF PRESENT ILLNESS:  Mr. Raza is a 67-year-old gentleman who I am seeing in followup after a recent hospitalization in 10/2017 for chest pain.  This gentleman has a history of coronary artery disease with several non-ST elevation myocardial infarctions with percutaneous intervention.  In 2015, he had a mid-LAD PCI procedure.  At that time, he had an inferior branch diagonal with obstructive disease which is medically managed.  Subsequently, in 2015, he had another non-ST elevation myocardial infarction and was taken back to the Cath Lab and was found to have a tight ostial diagonal stenosis that was treated with percutaneous intervention and his LAD was dilated with the balloon in a kissing fashion.  In 2016, he suffered another non-ST elevation myocardial infarction and was found to have an in-stent restenosis of the diagonal and underwent a cutting balloon angioplasty at that time.  In 2017, he had another ST elevation myocardial infarction and was found to have a diagonal in-stent restenosis, was dilated and then stented with a 3 x 8 Xience alpine stent.      In 2017, he had another non-ST elevation myocardial infarction and underwent a repeat coronary angiogram that showed his stents were patent.  Then more recently in October, he was admitted with chest pain.  Again, he was taken back to the Cath Lab, which demonstrated patent LAD stents.  The ostial of the diagonal branch had a stenosis that was negative by FFR.  There were already 2 layers of stents there, and given the negative FFR, it was felt it was unlikely to be the cause of his resting chest pain.  If needed in the future, consideration for bifurcation PCI if the symptoms warranted.  Donald tells me today that he was back into the hospital for pneumonia and was discharged on 2017.  His past medical history is complicated by the followin.  End-stage renal disease, on hemodialysis.   2.  HIV positive.   3.   Hypertension.   4.  Dyslipidemia, with significant hypertriglyceridemia.   5.  Diabetes.      Today he presents with elevated blood pressure.  With 2 recent hospitalizations, he has undergone some medication changes.  Today we reviewed his current medication list from his most recent hospitalization.  Currently, he is on the following cardiac medications:   1.  Amlodipine 5 mg per day.   2.  Losartan 25 mg each day at bedtime.   3.  Isosorbide mononitrate 90 mg each day at bedtime.   4.  Hydralazine 25 mg twice daily as needed for high blood pressure.   5.  Plavix 75 mg q. day.      To note, he is not on an aspirin.  I am assuming this is from his dialysis or his history of a major bleed.      LABWORK REVIEWED:  His cholesterol was checked in August.  His total cholesterol was 125, HDL was 33, LDL 30, triglycerides 311.  Hemoglobin on 11/26/2017 was 8.3.  With his last hospitalization, his hemoglobin actually got down to 6.7.  He received 3 units of packed red blood cells in the hospital.  He also received EPO with transfusions.      PHYSICAL EXAMINATION:   VITAL SIGNS:  Blood pressure in our office today was elevated at 162/74, pulse is 72, weight today is 196 pounds, BMI is 27.52.   HEART:  Tones are regular with an S1, S2.  No murmur, no S3, S4.   ABDOMEN:  Soft, nontender.   NECK:  No thyromegaly.   LUNGS:  Clear without crackles or wheezes.  Bilateral normal breath sounds.   VASCULAR:  He has a palpable left brachial AV fistula.   EXTREMITIES:  Lower extremities have normal muscle tone and strength.   LOWER EXTREMITIES:  No edema, clubbing or deformities.      IMPRESSION AND PLAN:   1.  Patient with recent hospitalization for noncardiac chest pain with known coronary artery disease, status post multiple previous stents with in-stent restenosis, with a recent coronary angiogram done in October that showed no significant disease with mild in-stent restenosis of the LAD.  The patient appears stable today without  any new symptoms of angina, heart failure or arrhythmias.   2.  End-stage renal disease.  He is on dialysis via left AV fistula.   3.  Hypertension with labile blood pressures.  His blood pressure is elevated today.  I have recommended we increase his amlodipine to 10 mg per day.   4.  Dyslipidemia.  His LDL is at goal.  He remains with elevated triglycerides.  To note, the patient is not on an aspirin.  I am assuming maybe this has to do with his dialysis.  Will pose the question to his primary cardiologist, but according to the patient, he has never been on an aspirin, but I do see it as part of his history.      It has been my pleasure to meet this gentleman.      Juany Hinojosa MS, ANP         DEDRICK MONTES DE OCA, CNP             D: 2017 08:48   T: 2017 09:56   MT: al      Name:     GREGORIO BRUSH   MRN:      [id number]        Account:      [id number]   :      1950           Service Date: 2017      Document: [id number]

## 2017-12-01 NOTE — PROGRESS NOTES
HPI and Plan:   See dictation    No orders of the defined types were placed in this encounter.    No orders of the defined types were placed in this encounter.    There are no discontinued medications.      Encounter Diagnoses   Name Primary?     History of coronary artery disease      Type 2 diabetes mellitus with chronic kidney disease on chronic dialysis, unspecified long term insulin use status (H)        CURRENT MEDICATIONS:  Current Outpatient Prescriptions   Medication Sig Dispense Refill     mycophenolate (GENERIC EQUIVALENT) 500 MG tablet Take 1 tablet (500 mg) by mouth 2 times daily On an empty stomach       insulin glargine (LANTUS) 100 UNIT/ML injection Inject 30 Units Subcutaneous every morning Take at 10 am       amoxicillin-clavulanate (AUGMENTIN) 500-125 MG per tablet Take 1 tablet by mouth daily for 5 days 5 tablet 0     AMLODIPINE BESYLATE PO Take 10 mg by mouth daily       Insulin Lispro (HUMALOG PEN SC) Take 15 units TID and an additional 2 units if BG is over 150       LOSARTAN POTASSIUM PO Take 25 mg by mouth every evening       calcium carbonate (TUMS) 500 MG chewable tablet Take 3 chew tab by mouth 3 times daily       isosorbide mononitrate (IMDUR) 30 MG 24 hr tablet Take 3 tablets (90 mg) by mouth every evening 270 tablet 1     B COMPLEX-C-FOLIC ACID PO Take 1 tablet by mouth At Bedtime        HYDRALAZINE HCL PO Take 25 mg by mouth 2 times daily as needed for high blood pressure       albuterol (PROAIR HFA/PROVENTIL HFA/VENTOLIN HFA) 108 (90 BASE) MCG/ACT Inhaler Inhale 2 puffs into the lungs every 6 hours as needed for shortness of breath / dyspnea or wheezing 1 Inhaler 1     Nutritional Supplements (NEPRO PO) 1-3 times daily       omeprazole (PRILOSEC) 40 MG capsule Take 40 mg by mouth daily 1 hour before dinner       order for DME Equipment being ordered: Other: 4WW  Treatment Diagnosis: Decreased activity tolerance 1 each 0     gabapentin (NEURONTIN) 300 MG capsule Take 600 mg by mouth  "every evening       imiquimod (ALDARA) 5 % cream Apply topically as needed       DOXERCALCIFEROL IV Inject 6 mcg into the vein three times a week (with dialysis)       CLONAZEPAM PO Take 0.5 mg by mouth nightly as needed for anxiety (restless legs)       CLOPIDOGREL BISULFATE PO Take 75 mg by mouth every evening        abacavir (ZIAGEN) 300 MG tablet Take 600 mg by mouth every evening        chlorhexidine (CHLORHEXIDINE) 0.12 % solution Rinse and gargle 15 ml by mouth twice a day as directed.       terbinafine (LAMISIL AT) 1 % cream Apply topically 2 times daily as needed        atorvastatin (LIPITOR) 40 MG tablet Take 40 mg by mouth At Bedtime       epoetin brittni (EPOGEN,PROCRIT) 35625 UNIT/ML injection Inject 11,000 Units Subcutaneous three times a week WITH DIALYSIS       iron sucrose (VENOFER) 20 MG/ML injection Inject 50 mg into the vein once a week WIth dialysis       MAGNESIUM OXIDE PO Take 400 mg by mouth At Bedtime       dolutegravir (TIVICAY) 50 MG tablet Take 50 mg by mouth At Bedtime       nitroglycerin (NITROSTAT) 0.4 MG SL tablet One tablet under the tongue every 5 minutes if needed for chest pain. May repeat every 5 minutes for a maximum of 3 doses in 15 minutes\" 25 tablet 3     gabapentin (NEURONTIN) 300 MG capsule Take 300 mg by mouth every morning        dapsone 100 MG tablet Take 100 mg by mouth At Bedtime        Darunavir Ethanolate (PREZISTA PO) Take 800 mg by mouth At Bedtime.       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, recurrent     Bradycardia 5/28/2016     CAD S/P percutaneous coronary angioplasty 6/15/2015     Chest pain      CKD (chronic kidney disease)     Hemodialysis     Dilated aortic root (H) 5/6/2016     ESRD (end stage renal disease) on dialysis (H) 5/6/2016     Hemodialysis-associated hypotension 5/22/2016     Human immunodeficiency virus (HIV) disease      Hypertension 2010     Hypotension, unspecified hypotension type 5/22/2016     Impotence of organic origin      Increased prostate specific antigen (PSA) velocity 08/08/2016    Awaiting bx on blood thinner     Mixed hyperlipidemia      Near syncope 2016    with hemodialysis     NSTEMI (non-ST elevated myocardial infarction) (H) 12/2015, 5/2016     Pulmonary HTN     Mod     TIA (transient ischemic attack) 5/2016     Type 2 diabetes mellitus (H) age 50     Unstable angina (H) 3/4/2016       PAST SURGICAL HISTORY:  Past Surgical History:   Procedure Laterality Date     ANGIOGRAM  03-04-16    No culprit lesions, stents widely patent      ANGIOGRAM  05-06-16    Cutting balloon ptca=Diag     APPENDECTOMY  2000     CHOLECYSTECTOMY, LAPOROSCOPIC       COLOSTOMY  09/30/1999    Temporary for diverticulitis     HEART CATH, ANGIOPLASTY  08-18-16    LAD PCI. Stented with a 3.0 x 8 mm Xience Alpine stent.     STENT, CORONARY, S660 15/18  12/2015    VANITA=Diag, PTCA=LAD     STENT, CORONARY, S660 15/18  06/2015    VANITA=LAD       FAMILY HISTORY:  Family History   Problem Relation Age of Onset     HEART DISEASE Brother 40     CABG     KIDNEY DISEASE Sister      Hypertension Sister      HEART DISEASE Brother      Dilated aorta       SOCIAL HISTORY:  Social History     Social History     Marital status:      Spouse name: N/A     Number of children: N/A     Years of education: N/A     Social History Main Topics     Smoking status: Former Smoker     Types: Cigarettes     Smokeless tobacco:  "Never Used     Alcohol use No     Drug use: No     Sexual activity: Not on file     Other Topics Concern     Parent/Sibling W/ Cabg, Mi Or Angioplasty Before 65f 55m? Yes     Caffeine Concern Yes     2 cups daily     Sleep Concern Yes     Special Diet No      more proteins     Exercise Yes     Cardiac rehab      Social History Narrative       Review of Systems:  Skin:  Negative     Eyes:  Negative glasses  ENT:  Negative    Respiratory:  Negative    Cardiovascular:  palpitations;lightheadedness    Gastroenterology: Negative    Genitourinary:  Negative    Musculoskeletal:  Positive for back pain  Neurologic:  Positive for numbness or tingling of feet;headaches  Psychiatric:  Negative    Heme/Lymph/Imm:  Negative    Endocrine:  Positive for diabetes    Physical Exam:  Vitals: /74 (BP Location: Right arm, Patient Position: Chair, Cuff Size: Adult Large)  Pulse 72  Ht 1.803 m (5' 11\")  Wt 89.3 kg (196 lb 14.4 oz)  BMI 27.46 kg/m2    Constitutional:           Skin:  warm and dry to the touch          Head:  normocephalic        Eyes:  pupils equal and round;sclera white        Lymph:      ENT:  no pallor or cyanosis        Neck:  JVP normal bilateral carotid bruit      Respiratory:  clear to auscultation;normal symmetry;normal respiratory excursion         Cardiac: regular rhythm;normal S1 and S2     no presence of murmur          pulses full and equal                                        GI:  abdomen soft        Extremities and Muscular Skeletal:  no edema              Neurological:  no gross motor deficits        Psych:           Recent Lab Results:  LIPID RESULTS:  Lab Results   Component Value Date    CHOL 125 08/09/2017    HDL 33 (L) 08/09/2017    LDL 30 08/09/2017    TRIG 311 (H) 08/09/2017    CHOLHDLRATIO 8.1 (H) 07/23/2012       LIVER ENZYME RESULTS:  Lab Results   Component Value Date    AST 23 09/20/2017    ALT 23 09/20/2017       CBC RESULTS:  Lab Results   Component Value Date    WBC 4.4 " 11/26/2017    RBC 2.92 (L) 11/26/2017    HGB 8.3 (L) 11/26/2017    HCT 26.6 (L) 11/26/2017    MCV 91 11/26/2017    MCH 28.4 11/26/2017    MCHC 31.2 (L) 11/26/2017    RDW 14.6 11/26/2017     (L) 11/26/2017       BMP RESULTS:  Lab Results   Component Value Date     11/26/2017    POTASSIUM 4.2 11/26/2017    CHLORIDE 98 11/26/2017    CO2 27 11/26/2017    ANIONGAP 9 11/26/2017     (H) 11/26/2017    BUN 41 (H) 11/26/2017    CR 6.97 (H) 11/26/2017    GFRESTIMATED 8 (L) 11/26/2017    GFRESTBLACK 10 (L) 11/26/2017    PRABHA 9.2 11/26/2017        A1C RESULTS:  Lab Results   Component Value Date    A1C 4.9 09/20/2017       INR RESULTS:  Lab Results   Component Value Date    INR 0.97 11/21/2017    INR 0.95 10/10/2017           CC  Sophieilal Tanesha Dixon MD  [address] [place] [id number]

## 2017-12-08 ENCOUNTER — CARE COORDINATION (OUTPATIENT)
Dept: CARE COORDINATION | Facility: CLINIC | Age: 67
End: 2017-12-08

## 2017-12-08 NOTE — PROGRESS NOTES
Clinic Care Coordination Contact  Valmeyer Jorge/Sangeeta Care Coordination Outreach    Patient was enrolled in [facility] upon Discharged from: Hospital    Program Type: Diabetes    Program Length: 30 days post discharge    Clinical Data:  Outreach Type: Variance follow up call     (Insert screen shot from [facility] if applicable)      Action: Actions: Left Voicemail                      Plan: RN CC will continue to monitor patients responses. Will plan to outreach as needed and with any new variances or concerns.

## 2017-12-18 ENCOUNTER — CARE COORDINATION (OUTPATIENT)
Dept: CARE COORDINATION | Facility: CLINIC | Age: 67
End: 2017-12-18

## 2017-12-18 NOTE — PROGRESS NOTES
Clinic Care Coordination Contact  Bethesda Jorge/Sangeeta Care Coordination Outreach    Patient was enrolled in [facility] upon Discharged from: Hospital    Program Type: Diabetes    Program Length: 30 days post discharge    Clinical Data:  Outreach Type: Variance follow up call     (Insert screen shot from [facility] if applicable)      Action: Actions: Left Voicemail            Plan: RN CC will continue to monitor patients responses. Will plan to outreach as needed and with any new variances or concerns.

## 2017-12-21 ENCOUNTER — CARE COORDINATION (OUTPATIENT)
Dept: CARE COORDINATION | Facility: CLINIC | Age: 67
End: 2017-12-21

## 2017-12-25 ENCOUNTER — APPOINTMENT (OUTPATIENT)
Dept: GENERAL RADIOLOGY | Facility: CLINIC | Age: 67
DRG: 682 | End: 2017-12-25
Attending: EMERGENCY MEDICINE
Payer: MEDICARE

## 2017-12-25 ENCOUNTER — HOSPITAL ENCOUNTER (INPATIENT)
Facility: CLINIC | Age: 67
LOS: 3 days | Discharge: HOME | DRG: 682 | End: 2017-12-28
Attending: EMERGENCY MEDICINE | Admitting: INTERNAL MEDICINE
Payer: MEDICARE

## 2017-12-25 ENCOUNTER — APPOINTMENT (OUTPATIENT)
Dept: CT IMAGING | Facility: CLINIC | Age: 67
DRG: 682 | End: 2017-12-25
Attending: EMERGENCY MEDICINE
Payer: MEDICARE

## 2017-12-25 DIAGNOSIS — Z99.2 ESRD (END STAGE RENAL DISEASE) ON DIALYSIS (H): ICD-10-CM

## 2017-12-25 DIAGNOSIS — I20.0 UNSTABLE ANGINA PECTORIS (H): Primary | ICD-10-CM

## 2017-12-25 DIAGNOSIS — R07.9 CHEST PAIN, UNSPECIFIED TYPE: ICD-10-CM

## 2017-12-25 DIAGNOSIS — I25.10 CORONARY ARTERY DISEASE INVOLVING NATIVE HEART, ANGINA PRESENCE UNSPECIFIED, UNSPECIFIED VESSEL OR LESION TYPE: ICD-10-CM

## 2017-12-25 DIAGNOSIS — J90 BILATERAL PLEURAL EFFUSION: ICD-10-CM

## 2017-12-25 DIAGNOSIS — R06.00 DYSPNEA, UNSPECIFIED TYPE: ICD-10-CM

## 2017-12-25 DIAGNOSIS — N18.6 ESRD (END STAGE RENAL DISEASE) ON DIALYSIS (H): ICD-10-CM

## 2017-12-25 DIAGNOSIS — I15.1 HYPERTENSION SECONDARY TO OTHER RENAL DISORDERS: ICD-10-CM

## 2017-12-25 LAB
ANION GAP SERPL CALCULATED.3IONS-SCNC: 13 MMOL/L (ref 3–14)
BASOPHILS # BLD AUTO: 0 10E9/L (ref 0–0.2)
BASOPHILS NFR BLD AUTO: 0.4 %
BUN SERPL-MCNC: 60 MG/DL (ref 7–30)
CALCIUM SERPL-MCNC: 9 MG/DL (ref 8.5–10.1)
CHLORIDE SERPL-SCNC: 103 MMOL/L (ref 94–109)
CO2 SERPL-SCNC: 23 MMOL/L (ref 20–32)
CREAT SERPL-MCNC: 8.72 MG/DL (ref 0.66–1.25)
DIFFERENTIAL METHOD BLD: ABNORMAL
EOSINOPHIL # BLD AUTO: 0.1 10E9/L (ref 0–0.7)
EOSINOPHIL NFR BLD AUTO: 1.5 %
ERYTHROCYTE [DISTWIDTH] IN BLOOD BY AUTOMATED COUNT: 16.6 % (ref 10–15)
FLUAV+FLUBV AG SPEC QL: NEGATIVE
FLUAV+FLUBV AG SPEC QL: NEGATIVE
GFR SERPL CREATININE-BSD FRML MDRD: 6 ML/MIN/1.7M2
GLUCOSE BLDC GLUCOMTR-MCNC: 150 MG/DL (ref 70–99)
GLUCOSE BLDC GLUCOMTR-MCNC: 152 MG/DL (ref 70–99)
GLUCOSE BLDC GLUCOMTR-MCNC: 241 MG/DL (ref 70–99)
GLUCOSE SERPL-MCNC: 232 MG/DL (ref 70–99)
HBA1C MFR BLD: NORMAL % (ref 4.3–6)
HCT VFR BLD AUTO: 31.4 % (ref 40–53)
HGB BLD-MCNC: 10 G/DL (ref 13.3–17.7)
IMM GRANULOCYTES # BLD: 0 10E9/L (ref 0–0.4)
IMM GRANULOCYTES NFR BLD: 0.6 %
LIPASE SERPL-CCNC: 187 U/L (ref 73–393)
LMWH PPP CHRO-ACNC: <0.1 IU/ML
LYMPHOCYTES # BLD AUTO: 0.7 10E9/L (ref 0.8–5.3)
LYMPHOCYTES NFR BLD AUTO: 15.8 %
MCH RBC QN AUTO: 28.7 PG (ref 26.5–33)
MCHC RBC AUTO-ENTMCNC: 31.8 G/DL (ref 31.5–36.5)
MCV RBC AUTO: 90 FL (ref 78–100)
MONOCYTES # BLD AUTO: 0.3 10E9/L (ref 0–1.3)
MONOCYTES NFR BLD AUTO: 7.1 %
NEUTROPHILS # BLD AUTO: 3.5 10E9/L (ref 1.6–8.3)
NEUTROPHILS NFR BLD AUTO: 74.6 %
NRBC # BLD AUTO: 0 10*3/UL
NRBC BLD AUTO-RTO: 0 /100
NT-PROBNP SERPL-MCNC: ABNORMAL PG/ML (ref 0–900)
PLATELET # BLD AUTO: 108 10E9/L (ref 150–450)
PLATELET # BLD AUTO: 88 10E9/L (ref 150–450)
POTASSIUM SERPL-SCNC: 5 MMOL/L (ref 3.4–5.3)
RBC # BLD AUTO: 3.49 10E12/L (ref 4.4–5.9)
SODIUM SERPL-SCNC: 139 MMOL/L (ref 133–144)
SPECIMEN SOURCE: NORMAL
TROPONIN I BLD-MCNC: 0.02 UG/L (ref 0–0.1)
TROPONIN I SERPL-MCNC: 0.03 UG/L (ref 0–0.04)
WBC # BLD AUTO: 4.7 10E9/L (ref 4–11)

## 2017-12-25 PROCEDURE — A9270 NON-COVERED ITEM OR SERVICE: HCPCS | Mod: GY | Performed by: INTERNAL MEDICINE

## 2017-12-25 PROCEDURE — 94640 AIRWAY INHALATION TREATMENT: CPT

## 2017-12-25 PROCEDURE — 40000281 ZZH STATISTIC TRANSPORT TIME EA 15 MIN

## 2017-12-25 PROCEDURE — 99223 1ST HOSP IP/OBS HIGH 75: CPT | Mod: AI | Performed by: INTERNAL MEDICINE

## 2017-12-25 PROCEDURE — 25000131 ZZH RX MED GY IP 250 OP 636 PS 637: Mod: GY | Performed by: INTERNAL MEDICINE

## 2017-12-25 PROCEDURE — 85520 HEPARIN ASSAY: CPT | Performed by: INTERNAL MEDICINE

## 2017-12-25 PROCEDURE — 85049 AUTOMATED PLATELET COUNT: CPT | Performed by: INTERNAL MEDICINE

## 2017-12-25 PROCEDURE — 27211040 ZZH CONTINUOUS NEBULIZER MICRO PUMP

## 2017-12-25 PROCEDURE — 71020 XR CHEST 2 VW: CPT

## 2017-12-25 PROCEDURE — 87804 INFLUENZA ASSAY W/OPTIC: CPT | Performed by: EMERGENCY MEDICINE

## 2017-12-25 PROCEDURE — 84484 ASSAY OF TROPONIN QUANT: CPT | Performed by: EMERGENCY MEDICINE

## 2017-12-25 PROCEDURE — 40000275 ZZH STATISTIC RCP TIME EA 10 MIN

## 2017-12-25 PROCEDURE — 83880 ASSAY OF NATRIURETIC PEPTIDE: CPT | Performed by: EMERGENCY MEDICINE

## 2017-12-25 PROCEDURE — 5A1D70Z PERFORMANCE OF URINARY FILTRATION, INTERMITTENT, LESS THAN 6 HOURS PER DAY: ICD-10-PCS | Performed by: INTERNAL MEDICINE

## 2017-12-25 PROCEDURE — 25000132 ZZH RX MED GY IP 250 OP 250 PS 637: Mod: GY | Performed by: INTERNAL MEDICINE

## 2017-12-25 PROCEDURE — 40000983 ZZH STATISTIC HFNC ADULT NON-CPAP

## 2017-12-25 PROCEDURE — 84484 ASSAY OF TROPONIN QUANT: CPT

## 2017-12-25 PROCEDURE — 25000125 ZZHC RX 250

## 2017-12-25 PROCEDURE — 25000128 H RX IP 250 OP 636: Performed by: INTERNAL MEDICINE

## 2017-12-25 PROCEDURE — 25000128 H RX IP 250 OP 636: Performed by: EMERGENCY MEDICINE

## 2017-12-25 PROCEDURE — 36415 COLL VENOUS BLD VENIPUNCTURE: CPT | Performed by: INTERNAL MEDICINE

## 2017-12-25 PROCEDURE — 12000007 ZZH R&B INTERMEDIATE

## 2017-12-25 PROCEDURE — 83690 ASSAY OF LIPASE: CPT | Performed by: EMERGENCY MEDICINE

## 2017-12-25 PROCEDURE — 00000146 ZZHCL STATISTIC GLUCOSE BY METER IP

## 2017-12-25 PROCEDURE — A9270 NON-COVERED ITEM OR SERVICE: HCPCS | Mod: GY | Performed by: EMERGENCY MEDICINE

## 2017-12-25 PROCEDURE — 71260 CT THORAX DX C+: CPT

## 2017-12-25 PROCEDURE — 93005 ELECTROCARDIOGRAM TRACING: CPT

## 2017-12-25 PROCEDURE — 36415 COLL VENOUS BLD VENIPUNCTURE: CPT | Performed by: EMERGENCY MEDICINE

## 2017-12-25 PROCEDURE — 90937 HEMODIALYSIS REPEATED EVAL: CPT

## 2017-12-25 PROCEDURE — 99285 EMERGENCY DEPT VISIT HI MDM: CPT | Mod: 25

## 2017-12-25 PROCEDURE — 80048 BASIC METABOLIC PNL TOTAL CA: CPT | Performed by: EMERGENCY MEDICINE

## 2017-12-25 PROCEDURE — 85025 COMPLETE CBC W/AUTO DIFF WBC: CPT | Performed by: EMERGENCY MEDICINE

## 2017-12-25 PROCEDURE — 27210300 ZZH CANNULA HIGH FLOW, ADULT

## 2017-12-25 PROCEDURE — 25000132 ZZH RX MED GY IP 250 OP 250 PS 637: Mod: GY | Performed by: EMERGENCY MEDICINE

## 2017-12-25 PROCEDURE — 84484 ASSAY OF TROPONIN QUANT: CPT | Performed by: INTERNAL MEDICINE

## 2017-12-25 RX ORDER — AMLODIPINE BESYLATE 10 MG/1
10 TABLET ORAL DAILY
Status: DISCONTINUED | OUTPATIENT
Start: 2017-12-25 | End: 2017-12-28 | Stop reason: HOSPADM

## 2017-12-25 RX ORDER — NALOXONE HYDROCHLORIDE 0.4 MG/ML
.1-.4 INJECTION, SOLUTION INTRAMUSCULAR; INTRAVENOUS; SUBCUTANEOUS
Status: DISCONTINUED | OUTPATIENT
Start: 2017-12-25 | End: 2017-12-28 | Stop reason: HOSPADM

## 2017-12-25 RX ORDER — IOPAMIDOL 755 MG/ML
500 INJECTION, SOLUTION INTRAVASCULAR ONCE
Status: COMPLETED | OUTPATIENT
Start: 2017-12-25 | End: 2017-12-25

## 2017-12-25 RX ORDER — ALBUTEROL SULFATE 0.83 MG/ML
2.5 SOLUTION RESPIRATORY (INHALATION)
Status: DISCONTINUED | OUTPATIENT
Start: 2017-12-25 | End: 2017-12-28 | Stop reason: HOSPADM

## 2017-12-25 RX ORDER — CLOPIDOGREL BISULFATE 75 MG/1
75 TABLET ORAL EVERY EVENING
Status: DISCONTINUED | OUTPATIENT
Start: 2017-12-25 | End: 2017-12-28 | Stop reason: HOSPADM

## 2017-12-25 RX ORDER — CALCIUM CARBONATE 500 MG/1
1500 TABLET, CHEWABLE ORAL 3 TIMES DAILY
Status: DISCONTINUED | OUTPATIENT
Start: 2017-12-25 | End: 2017-12-28 | Stop reason: HOSPADM

## 2017-12-25 RX ORDER — GABAPENTIN 300 MG/1
600 CAPSULE ORAL EVERY EVENING
Status: DISCONTINUED | OUTPATIENT
Start: 2017-12-25 | End: 2017-12-28 | Stop reason: HOSPADM

## 2017-12-25 RX ORDER — ASPIRIN 81 MG/1
324 TABLET, CHEWABLE ORAL ONCE
Status: DISCONTINUED | OUTPATIENT
Start: 2017-12-25 | End: 2017-12-25

## 2017-12-25 RX ORDER — RITONAVIR 100 MG/1
100 TABLET ORAL AT BEDTIME
Status: DISCONTINUED | OUTPATIENT
Start: 2017-12-25 | End: 2017-12-28 | Stop reason: HOSPADM

## 2017-12-25 RX ORDER — DAPSONE 100 MG/1
100 TABLET ORAL AT BEDTIME
Status: DISCONTINUED | OUTPATIENT
Start: 2017-12-25 | End: 2017-12-28 | Stop reason: HOSPADM

## 2017-12-25 RX ORDER — ACETAMINOPHEN 500 MG
1000 TABLET ORAL
Status: COMPLETED | OUTPATIENT
Start: 2017-12-25 | End: 2017-12-25

## 2017-12-25 RX ORDER — ALBUTEROL SULFATE 0.83 MG/ML
SOLUTION RESPIRATORY (INHALATION)
Status: COMPLETED
Start: 2017-12-25 | End: 2017-12-25

## 2017-12-25 RX ORDER — ATORVASTATIN CALCIUM 40 MG/1
40 TABLET, FILM COATED ORAL AT BEDTIME
Status: DISCONTINUED | OUTPATIENT
Start: 2017-12-25 | End: 2017-12-28 | Stop reason: HOSPADM

## 2017-12-25 RX ORDER — GABAPENTIN 300 MG/1
300 CAPSULE ORAL EVERY MORNING
Status: DISCONTINUED | OUTPATIENT
Start: 2017-12-25 | End: 2017-12-28 | Stop reason: HOSPADM

## 2017-12-25 RX ORDER — LOSARTAN POTASSIUM 25 MG/1
25 TABLET ORAL EVERY EVENING
Status: DISCONTINUED | OUTPATIENT
Start: 2017-12-25 | End: 2017-12-26

## 2017-12-25 RX ORDER — ONDANSETRON 2 MG/ML
4 INJECTION INTRAMUSCULAR; INTRAVENOUS EVERY 6 HOURS PRN
Status: DISCONTINUED | OUTPATIENT
Start: 2017-12-25 | End: 2017-12-28 | Stop reason: HOSPADM

## 2017-12-25 RX ORDER — CLONAZEPAM 0.5 MG/1
0.5 TABLET ORAL
Status: DISCONTINUED | OUTPATIENT
Start: 2017-12-25 | End: 2017-12-28 | Stop reason: HOSPADM

## 2017-12-25 RX ORDER — NICOTINE POLACRILEX 4 MG
15-30 LOZENGE BUCCAL
Status: DISCONTINUED | OUTPATIENT
Start: 2017-12-25 | End: 2017-12-28 | Stop reason: HOSPADM

## 2017-12-25 RX ORDER — ALBUTEROL SULFATE 5 MG/ML
2.5 SOLUTION RESPIRATORY (INHALATION) ONCE
Status: DISCONTINUED | OUTPATIENT
Start: 2017-12-25 | End: 2017-12-28 | Stop reason: HOSPADM

## 2017-12-25 RX ORDER — ALBUTEROL SULFATE 90 UG/1
2 AEROSOL, METERED RESPIRATORY (INHALATION) EVERY 6 HOURS PRN
Status: DISCONTINUED | OUTPATIENT
Start: 2017-12-25 | End: 2017-12-28 | Stop reason: HOSPADM

## 2017-12-25 RX ORDER — MYCOPHENOLATE MOFETIL 250 MG/1
500 CAPSULE ORAL 2 TIMES DAILY
Status: DISCONTINUED | OUTPATIENT
Start: 2017-12-25 | End: 2017-12-28 | Stop reason: HOSPADM

## 2017-12-25 RX ORDER — NITROGLYCERIN 0.4 MG/1
0.4 TABLET SUBLINGUAL EVERY 5 MIN PRN
Status: DISCONTINUED | OUTPATIENT
Start: 2017-12-25 | End: 2017-12-28 | Stop reason: HOSPADM

## 2017-12-25 RX ORDER — ABACAVIR 300 MG/1
600 TABLET ORAL EVERY EVENING
Status: DISCONTINUED | OUTPATIENT
Start: 2017-12-25 | End: 2017-12-28 | Stop reason: HOSPADM

## 2017-12-25 RX ORDER — HYDROMORPHONE HYDROCHLORIDE 1 MG/ML
.3-.5 INJECTION, SOLUTION INTRAMUSCULAR; INTRAVENOUS; SUBCUTANEOUS
Status: DISCONTINUED | OUTPATIENT
Start: 2017-12-25 | End: 2017-12-28 | Stop reason: HOSPADM

## 2017-12-25 RX ORDER — HYDRALAZINE HYDROCHLORIDE 25 MG/1
25 TABLET, FILM COATED ORAL 4 TIMES DAILY PRN
Status: DISCONTINUED | OUTPATIENT
Start: 2017-12-25 | End: 2017-12-28 | Stop reason: HOSPADM

## 2017-12-25 RX ORDER — ACETAMINOPHEN 325 MG/1
650 TABLET ORAL EVERY 4 HOURS PRN
Status: DISCONTINUED | OUTPATIENT
Start: 2017-12-25 | End: 2017-12-28 | Stop reason: HOSPADM

## 2017-12-25 RX ORDER — DEXTROSE MONOHYDRATE 25 G/50ML
25-50 INJECTION, SOLUTION INTRAVENOUS
Status: DISCONTINUED | OUTPATIENT
Start: 2017-12-25 | End: 2017-12-28 | Stop reason: HOSPADM

## 2017-12-25 RX ORDER — ONDANSETRON 4 MG/1
4 TABLET, ORALLY DISINTEGRATING ORAL EVERY 6 HOURS PRN
Status: DISCONTINUED | OUTPATIENT
Start: 2017-12-25 | End: 2017-12-28 | Stop reason: HOSPADM

## 2017-12-25 RX ORDER — HEPARIN SODIUM 5000 [USP'U]/.5ML
5000 INJECTION, SOLUTION INTRAVENOUS; SUBCUTANEOUS EVERY 12 HOURS
Status: DISCONTINUED | OUTPATIENT
Start: 2017-12-25 | End: 2017-12-25

## 2017-12-25 RX ADMIN — RITONAVIR 100 MG: 100 TABLET, FILM COATED ORAL at 21:40

## 2017-12-25 RX ADMIN — Medication 4000 UNITS: at 22:56

## 2017-12-25 RX ADMIN — CALCIUM CARBONATE (ANTACID) CHEW TAB 500 MG 1500 MG: 500 CHEW TAB at 21:38

## 2017-12-25 RX ADMIN — ABACAVIR 600 MG: 300 TABLET, FILM COATED ORAL at 19:54

## 2017-12-25 RX ADMIN — GABAPENTIN 600 MG: 300 CAPSULE ORAL at 19:51

## 2017-12-25 RX ADMIN — ALBUTEROL SULFATE 2.5 MG: 2.5 SOLUTION RESPIRATORY (INHALATION) at 17:03

## 2017-12-25 RX ADMIN — IOPAMIDOL 72 ML: 755 INJECTION, SOLUTION INTRAVENOUS at 10:59

## 2017-12-25 RX ADMIN — AMLODIPINE BESYLATE 10 MG: 10 TABLET ORAL at 14:56

## 2017-12-25 RX ADMIN — Medication 4850 UNITS: at 16:39

## 2017-12-25 RX ADMIN — Medication 0.5 MG: at 16:19

## 2017-12-25 RX ADMIN — ACETAMINOPHEN 650 MG: 325 TABLET, FILM COATED ORAL at 16:15

## 2017-12-25 RX ADMIN — ISOSORBIDE MONONITRATE 90 MG: 60 TABLET, EXTENDED RELEASE ORAL at 19:51

## 2017-12-25 RX ADMIN — CLOPIDOGREL 75 MG: 75 TABLET, FILM COATED ORAL at 19:51

## 2017-12-25 RX ADMIN — MYCOPHENOLATE MOFETIL 500 MG: 250 CAPSULE ORAL at 20:58

## 2017-12-25 RX ADMIN — INSULIN ASPART 1 UNITS: 100 INJECTION, SOLUTION INTRAVENOUS; SUBCUTANEOUS at 16:00

## 2017-12-25 RX ADMIN — DARUNAVIR 800 MG: 800 TABLET, FILM COATED ORAL at 21:39

## 2017-12-25 RX ADMIN — DAPSONE 100 MG: 100 TABLET ORAL at 21:38

## 2017-12-25 RX ADMIN — DOLUTEGRAVIR SODIUM 50 MG: 50 TABLET, FILM COATED ORAL at 21:44

## 2017-12-25 RX ADMIN — CALCIUM CARBONATE (ANTACID) CHEW TAB 500 MG 1500 MG: 500 CHEW TAB at 15:56

## 2017-12-25 RX ADMIN — GABAPENTIN 300 MG: 300 CAPSULE ORAL at 14:56

## 2017-12-25 RX ADMIN — OMEPRAZOLE 40 MG: 20 CAPSULE, DELAYED RELEASE ORAL at 16:00

## 2017-12-25 RX ADMIN — LOSARTAN POTASSIUM 25 MG: 25 TABLET ORAL at 19:51

## 2017-12-25 RX ADMIN — SODIUM CHLORIDE 85 ML: 9 INJECTION, SOLUTION INTRAVENOUS at 10:59

## 2017-12-25 RX ADMIN — HEPARIN SODIUM 950 UNITS/HR: 10000 INJECTION, SOLUTION INTRAVENOUS at 16:37

## 2017-12-25 RX ADMIN — ATORVASTATIN CALCIUM 40 MG: 40 TABLET, FILM COATED ORAL at 21:38

## 2017-12-25 RX ADMIN — ACETAMINOPHEN 1000 MG: 500 TABLET, FILM COATED ORAL at 10:00

## 2017-12-25 ASSESSMENT — ACTIVITIES OF DAILY LIVING (ADL)
DRESS: 0-->INDEPENDENT
AMBULATION: 0-->INDEPENDENT
TRANSFERRING: 0-->INDEPENDENT
RETIRED_COMMUNICATION: 0-->UNDERSTANDS/COMMUNICATES WITHOUT DIFFICULTY
SWALLOWING: 0-->SWALLOWS FOODS/LIQUIDS WITHOUT DIFFICULTY
FALL_HISTORY_WITHIN_LAST_SIX_MONTHS: NO
COGNITION: 0 - NO COGNITION ISSUES REPORTED
ADLS_ACUITY_SCORE: 10
TOILETING: 0-->INDEPENDENT
ADLS_ACUITY_SCORE: 9
RETIRED_EATING: 0-->INDEPENDENT
BATHING: 0-->INDEPENDENT

## 2017-12-25 ASSESSMENT — ENCOUNTER SYMPTOMS
COUGH: 1
FEVER: 1
NAUSEA: 1
SHORTNESS OF BREATH: 1
VOMITING: 0

## 2017-12-25 NOTE — PHARMACY-ADMISSION MEDICATION HISTORY
Admission medication history interview status for this patient is complete. See [facility] admission navigator for allergy information, prior to admission medications and immunization status.     Medication history interview source(s):Patient  Medication history resources (including written lists, pill bottles, clinic record):care everywhere  Primary pharmacy:[facility]  Specialty pharmacy    Patient does not know doses on dialysis meds- or what days gets venofer.  Left doses as they were in pta list from previous admission- Venofer/doxercalciferol match Care everywhere     Changes made to PTA medication list:  Added:   Deleted:   Changed: lantus, hydralazine,    Actions taken by pharmacist (provider contacted, etc):None     Additional medication history information:None    Medication reconciliation/reorder completed by provider prior to medication history? Yes    Do you take OTC medications (eg tylenol, ibuprofen, fish oil, eye/ear drops, etc)? Y(Y/N)    For patients on insulin therapy: Y (Y/N)  Lantus  50 daily- takes about noon (see Med list for doses)   Sliding scale Novolog sort of- gives 2 units if over 150  If Yes, do you have a baseline novolog pre-meal dose:  units with meals Y 15 units  Patients eat three meals a day:   Y    Prior to Admission medications    Medication Sig Last Dose Taking? Auth Provider   insulin glargine (LANTUS) 100 UNIT/ML injection Inject 50 Units Subcutaneous daily Takes about noon 12/24/2017 at Unknown time Yes Unknown, Entered By History   HYDRALAZINE HCL PO Take 25 mg by mouth 2 times daily 12/24/2017 at Unknown time Yes Unknown, Entered By History   mycophenolate (GENERIC EQUIVALENT) 500 MG tablet Take 1 tablet (500 mg) by mouth 2 times daily On an empty stomach Past Week at Unknown time Yes Sherice Bernardo MD   Insulin Lispro (HUMALOG PEN SC) Take 15 units TID and an additional 2 units if BG is over 150 12/24/2017 at Unknown time Yes Unknown, Entered By History   calcium  "carbonate (TUMS) 500 MG chewable tablet Take 3 chew tab by mouth 3 times daily 12/24/2017 at Unknown time Yes Unknown, Entered By History   Nutritional Supplements (NEPRO PO) 1-3 times daily 12/24/2017 at Unknown time Yes Unknown, Entered By History   gabapentin (NEURONTIN) 300 MG capsule Take 600 mg by mouth every evening 12/23/2017 Yes Unknown, Entered By History   DOXERCALCIFEROL IV Inject 6 mcg into the vein three times a week (with dialysis) 12/23/2017 Yes Reported, Patient   CLONAZEPAM PO Take 0.5 mg by mouth nightly as needed for anxiety (restless legs) Past Week at Unknown time Yes Unknown, Entered By History   CLOPIDOGREL BISULFATE PO Take 75 mg by mouth every evening  12/23/2017 at Unknown time Yes Unknown, Entered By History   chlorhexidine (CHLORHEXIDINE) 0.12 % solution Rinse and gargle 15 ml by mouth twice a day as directed. 12/24/2017 at Unknown time Yes Abstract, Provider   terbinafine (LAMISIL AT) 1 % cream Apply topically 2 times daily as needed  Past Week at Unknown time Yes Abstract, Provider   iron sucrose (VENOFER) 20 MG/ML injection Inject 50 mg into the vein once a week WIth dialysis  Yes Unknown, Entered By History   nitroglycerin (NITROSTAT) 0.4 MG SL tablet One tablet under the tongue every 5 minutes if needed for chest pain. May repeat every 5 minutes for a maximum of 3 doses in 15 minutes\" 12/25/2017 at Unknown time Yes Lay Paz MD   gabapentin (NEURONTIN) 300 MG capsule Take 300 mg by mouth every morning  12/24/2017 at Unknown time Yes Unknown, Entered By History   AMLODIPINE BESYLATE PO Take 10 mg by mouth daily 12/23/2017  Unknown, Entered By History   LOSARTAN POTASSIUM PO Take 25 mg by mouth every evening 12/23/2017  Unknown, Entered By History   isosorbide mononitrate (IMDUR) 30 MG 24 hr tablet Take 3 tablets (90 mg) by mouth every evening 12/23/2017  Arnoldo Diaz MD   B COMPLEX-C-FOLIC ACID PO Take 1 tablet by mouth At Bedtime  12/23/2017  Reported, Patient   albuterol " (PROAIR HFA/PROVENTIL HFA/VENTOLIN HFA) 108 (90 BASE) MCG/ACT Inhaler Inhale 2 puffs into the lungs every 6 hours as needed for shortness of breath / dyspnea or wheezing More than a month at Unknown time  Nelson Worthy MD   omeprazole (PRILOSEC) 40 MG capsule Take 40 mg by mouth daily 1 hour before dinner 12/23/2017  Unknown, Entered By History   order for DME Equipment being ordered: Other: 4WW  Treatment Diagnosis: Decreased activity tolerance   Lorna Jhaveri MD   imiquimod (ALDARA) 5 % cream Apply topically as needed not recent  Reported, Patient   abacavir (ZIAGEN) 300 MG tablet Take 600 mg by mouth every evening  12/23/2017  Abstract, Provider   atorvastatin (LIPITOR) 40 MG tablet Take 40 mg by mouth At Bedtime 12/23/2017  Unknown, Entered By History   epoetin brittni (EPOGEN,PROCRIT) 46505 UNIT/ML injection Inject 11,000 Units Subcutaneous three times a week WITH DIALYSIS 12/23/2017  Unknown, Entered By History   MAGNESIUM OXIDE PO Take 400 mg by mouth At Bedtime 12/23/2017  Unknown, Entered By History   dolutegravir (TIVICAY) 50 MG tablet Take 50 mg by mouth At Bedtime 12/23/2017  Unknown, Entered By History   dapsone 100 MG tablet Take 100 mg by mouth At Bedtime  12/23/2017  Reported, Patient   Darunavir Ethanolate (PREZISTA PO) Take 800 mg by mouth At Bedtime. 12/23/2017  Reported, Patient   ritonavir (NORVIR) 100 MG capsule Take 1 capsule by mouth At Bedtime  12/23/2017  Reported, Patient

## 2017-12-25 NOTE — ED NOTES
Pt arrives via EMS from home d/t R CP and SOB that woke him up around 0330. Pt called EMS around 0700. Pt found to be ST with , and CP 7/10. Pt given 3 SL Nitro and then found to be in SR, , and CP 3/10. Pt also given 324 mg ASA and zofran. Pt not on home 02, sating 90-91% on RA. Hx stents, NSTEMI, and dialysis (T,Thr,Sat), last run on 12/23. ABC intact. A&O x4.

## 2017-12-25 NOTE — PROGRESS NOTES
"@1601 pt assessed with O2 Sat 88% 3L. Increased O2 flow with pt remained in lower 80's, Provider notified.    MD at bed side, new order for Neb and high flow. Per MD consulted nephrology planning on getting pt dialyzed now as soon as dialysis nurse arranged.    @1715 pt on high flow nasal canula O2 Sat 96%.  Will continue monitoring pt.    @1745 pt transferred to dialysis room.    @1848 this nurse spoke with dialysis nurse, per nurse pt doing well O2 Sat 100@ on high flow need to be reevaluated.     @1850 RT notified \"Pt being dialyzed, per dialysis nurse need to be reevaluated to adjust O2. Pt responding well to dialysis. Please see pt\"    @1935 pt back from dialysis VSS, breathing improving RT titrating FiO2(%). No c/o chest pain or difficulty breathing. Pt appear to be comfortable. C/o headache rating at 6/10 pain scale earlier tylenol given x1 still has some headache but decreased to 3/10.    Now pt comfortable sleeping, VSS, LS diminished with some expiratory wheezes. Tele SR.  and 241. O2 Sat 96% on high flow. No c/o chest pain or difficulty breathing. Dilaudid IV x1 for Right side back and chest pain with pt report relief.   "

## 2017-12-25 NOTE — H&P
CHIEF COMPLAINT:  Chest pain and shortness of breath.      HISTORY OF PRESENT ILLNESS:  Mr. Donald Raza is a 67-year-old man with a complex past medical history including coronary artery disease who woke up at about 4 this morning with right-sided chest pain and shortness of breath.  He said he feels like a 300 pound man and was unable to catch his breath.  He also reports the pain as a stabbing, going through to his back.  It has been continuous.  He did take nitroglycerin at home and also in the ambulance and with some improvement in his pain.  He was hospitalized in October at [facility] for chest pains and he said that these pains are different.  At that time he had an angiogram which showed that his previous stents were patent.  There was some stenosis of a diagonal branch and consideration of possible further PCI depending on his symptoms.  He also reports he has a mild stuffy nose and a mild cough.      PAST MEDICAL HISTORY:   1.  End-stage renal disease, on hemodialysis.   2.  HIV infection.   3.  Diabetes mellitus.   4.  Coronary artery disease with non-ST elevation myocardial infarction.  He has had stents in 2015 in the LAD and the 2 diagonals.  He had a repeat angiogram in October 2017 which showed stenosis of the ostium of the diagonal branch negative by FFR and plans to consider bifurcation PCI if symptoms warranted further intervention.   5.  Echocardiogram in October 2017 showed normal ejection fraction with inferior and lateral hypokinesis.   6.  History of diverticulitis with resection, colostomy and subsequent colostomy takedown.   7.  Laparoscopic cholecystectomy.   8.  Appendectomy.   9.  Dyslipidemia.   10.  Pulmonary hypertension.   11.  TIA.   12.  Hospitalization in November of this year at [facility] for right-sided pneumonia.  He was discharged on 11/28.      MEDICATIONS:  Have not yet been reconcile, but have recently included Ziagen, albuterol, amlodipine, atorvastatin, Tums,  Klonopin, Plavix, dapsone, Dolutegravir, gabapentin, Glargine insulin, Imdur, Cozaar, mycophenolate, nitroglycerin, Prilosec, Ritonavir, B complex vitamins, hydralazine, aloe vera cream, lispro insulin, magnesium oxide.      ALLERGIES:  CALCIUM ACETATE, LISINOPRIL, SULFA, CONTRAST DYE contributes to renal failure.      FAMILY HISTORY:  He has a sister had kidney disease, a sister with hypertension, a brother with heart disease.      SOCIAL HISTORY:  He was born in [place].  He smoked in the past, but has quit.  He lives with his wife and brother.        REVIEW OF SYSTEMS:  A 10-point review of systems was performed.  The pertinent positives can be seen in the history of present illness.      PHYSICAL EXAMINATION:   VITAL SIGNS:  Blood pressure is 188/90, pulse 76, temperature 98.9, oxygen saturations ranged from 88-97% on room air.   GENERAL:  He is sleepy but easily arouseable, pleasant and cooperative, in no apparent distress.   HEENT:  Pupils round and equal.  Conjunctiva, sclerae and lids are within normal limits.   NECK:  Supple, with no masses, no thyromegaly.  Oropharynx is pink and moist.  Teeth and lips are within normal limits.   CARDIOVASCULAR:  Heart regular rhythm, no murmurs.   LUNGS:  Clear to auscultation bilaterally with normal respiratory effort.  Chest pain is not reproducible with palpation of the right chest.   ABDOMEN:  Soft, nontender.  There are no masses.  Bowel sounds are active.   EXTREMITIES:  There is no edema.   NEUROLOGIC:  Strength and sensation are intact in all 4 extremities.  Cranial nerves II through XII are grossly intact.   PSYCHIATRIC:  Mood and affect appear within normal limits.   SKIN:  No rashes are noted on limited exam.      LABORATORY DATA:  Troponin is 0.026.  Sodium 139, potassium 5, chloride 103, bicarbonate 23, BUN 60, creatinine 8.7, glucose 232.  Brain natriuretic peptide is 14,577.  White blood cell count is 4.7, hemoglobin 10, platelets 88,000.  Influenza swab was  negative.  CT of the chest shows small bilateral pleural effusions, no PE.      I reviewed previous medical records and septic.  I discussed the case in detail with the physician in the Emergency Department and I personally visualized his chest x-ray which showed mild diffuse bilateral interstitial prominence      ASSESSMENT AND PLAN:  Mr. Raza is a 67-year-old man with a complex past medical history including coronary artery disease who presents with several hours of right-sided chest pain.  He has a history of well controlled HIV infection, end-stage renal disease, on hemodialysis, diabetes mellitus, pulmonary hypertension and dyslipidemia.   1.  Chest pain.  He had some typical and atypical features for unstable angina.  The chest pain is on the right side and has been constant, which would be atypical for acute coronary syndrome.  His symptoms, however, were improved with nitroglycerin and is accompanied by significant dyspnea.  As mentioned above, there was some concern for ongoing stenosis of the ostium of the diagonal branch and it is unclear if this would all be associated with that finding.  For now we will continue to check serial troponins and it is reassuring that the first troponin is negative after several hours of symptoms.  For potential presumed unstable angina we will treat with heparin and stop if troponins are negative.  We will monitor on telemetry.  Given the complex historical information, we will request a Cardiology consultation to help determine if further cardiac source should be pursued.  We will reconcile his medications and he will continue on platelet inhibitors and angiotensin receptor blocker.  It is not clear if he was on a beta blocker prior to admission.  He also does not appear to currently be on a statin therapy and we will clarified this.   2.  End-stage renal disease.  His BUN is quite high.  He has been attending dialysis and it seems to always run on the higher side.  We  will request Nephrology consultation to direct his hemodialysis.   3.  HIV.  We will clarify his antiretroviral medication regimen and resume.   4.  Diabetes mellitus.  We will continue his Lantus and premeal insulin and monitor blood sugars.   5.  Possible viral illness.  He does describe some nasal congestion and mild cough.  It is unclear if this is at all contributing to his symptoms and he does not clearly describe a pleuritic component to his pain.     See addendum in subsequent note.          DAVID SARAVIA MD             D: 2017 13:03   T: 2017 17:19   MT: [id number]      Name:     GREGORIO BRUSH   MRN:      [id number]        Account:      [id number]   :      1950           Admitted:     [id number]      Document: [id number]       cc: Aida Thomas MD

## 2017-12-25 NOTE — IP AVS SNAPSHOT
[place] [address] [facility]    [address]    [facility] [address]    Phone:  [phone]    Fax:  [phone]                                       After Visit Summary   12/25/2017    Donald Raza    MRN: [id number]           After Visit Summary Signature Page     I have received my discharge instructions, and my questions have been answered. I have discussed any challenges I see with this plan with the nurse or doctor.    ..........................................................................................................................................  Patient/Patient Representative Signature      ..........................................................................................................................................  Patient Representative Print Name and Relationship to Patient    ..................................................               ................................................  Date                                            Time    ..........................................................................................................................................  Reviewed by Signature/Title    ...................................................              ..............................................  Date                                                            Time

## 2017-12-25 NOTE — H&P
H and P dictated.  66 yo man with ESRD, HIV, CAD admitted with R chest pain and dyspnea that woke him up at 4am.  Not typical for [place] but improved with nitro and had cath in 10/2017 which showed some some narrowing of a diagonal artery.  CT of chest not remarkable  Trops negative.  Heparin, Tele, pain control, cards and neph consults.      ADDENDUM:  Patient became more dyspneic and hypoxic suddenly on the floor.  Has some rattling cough.  CT of chest did not give a clear reason for this.  RT is giving him a neb and he is requiring high flow O2 as he still had O2 sats in the low 80s on 15L by FM.  I discussed with Dr Marcial from nephrology and he was going to arrange urgent dialysis in case fluid overload is causing the hypoxia. I also discussed with the evening on call hospitalist.

## 2017-12-25 NOTE — PROGRESS NOTES
Respiratory Therapy Note    Patient placed on HFNC per MD order at 1700. Patient tolerated well. Patient currently on 40 LPM and FiO2 90%. Respiratory rate 26, breath sounds coarse/diminished, and SpO2 94%. Albuterol nebulizer given inline with HFNC. Patient was then transported to Dialysis by oxymask and was then placed back on HFNC for duration of treatment. RT to follow.    Marissa Feliz  5:03 PM December 25, 2017

## 2017-12-25 NOTE — LETTER
Hand-off for Care Transitions to Next Level of Care Provider  Name: Donald Raza  MRN #: [id number]  Reason for Hospitalization:  Bilateral pleural effusion [J90]  ESRD (end stage renal disease) on dialysis (H) [N18.6, Z99.2]  Dyspnea, unspecified type [R06.00]  Chest pain, unspecified type [R07.9]  Admit Date/Time: 12/25/2017  7:30 AM  Discharge Date: 12/28/2017    Reason for Communication Hand-off Referral: Other FYI on admission to [facility]    Follow-up plan:  Future Appointments  Date Time Provider Department Center   1/17/2018 2:30 PM Yuki Hinojosa APRN CNP [facility]   1/29/2018 9:45 AM [facility]       Any outstanding tests or procedures:        Referrals     Future Labs/Procedures    Follow-Up with Cardiac Advanced Practice Provider         Follow-up plan:  Future Appointments  Date Time Provider Department Center   1/29/2018 9:45 AM Arnoldo Diaz MD [facility]         Key Recommendations:  Pt was admitted with CP/elevated BNP 38377. He was followed by cardiology and nephrology in hospital. His Hgb was 6.7 on admit and received blood on dialysis. His follow up Hgb was 8.2. Per cardiology, pt's CP improved after HD and blood transfusion. They are recommending:  Add low dose nitrates.   Best to keep HB above 8  If recurrent pain would cath.   Pt anticipates he will dc back home with his family after dialysis on Thurs. His wife will schedule follow up appt as recommended.    Karen Botello    AVS/Discharge Summary is the source of truth; this is a helpful guide for improved communication of patient story

## 2017-12-25 NOTE — ED NOTES
[facility]  ED Nurse Handoff Report    Donald Raza is a 67 year old male   ED Chief complaint: Chest Pain and Shortness of Breath  . ED Diagnosis:   Final diagnoses:   Chest pain, unspecified type   Dyspnea, unspecified type   Bilateral pleural effusion   ESRD (end stage renal disease) on dialysis (H)     Allergies:   Allergies   Allergen Reactions     Calcium Acetate Other (See Comments)     Other reaction(s): Other, see comments  Pain in chest and back  Pain in chest area (sensitive skin)      Diagnostic X-Ray Materials Other (See Comments)     PN: renal failure     Lisinopril      Sulfa Drugs        Code Status: Full Code  Activity level - Baseline/Home:  Independent. Activity Level - Current:   Stand with Assist. Lift room needed: No. Bariatric: No   Needed: No   Isolation: No. Infection: Not Applicable.     Vital Signs:   Vitals:    12/25/17 1119 12/25/17 1130 12/25/17 1139 12/25/17 1142   BP:  188/90     Resp:       Temp:       TempSrc:       SpO2: 94% 93% (!) 88% 95%       Cardiac Rhythm:  ,   Cardiac  Cardiac Rhythm: Normal sinus rhythm  Pain level: 0-10 Pain Scale: 3  Patient confused: No. Patient Falls Risk: Yes.   Elimination Status: Has voided   Patient Report - Pt arrives via EMS from home d/t R CP and SOB that woke him up around 0330. Pt called EMS around 0700. Pt found to be ST with , and CP 7/10. Pt given 3 SL Nitro and then found to be in SR, , and CP 3/10. Pt also given 324 mg ASA and zofran. Pt not on home 02, sating 90-91% on RA. Hx stents, NSTEMI, and dialysis (T,Thr,Sat), last run on 12/23.    Focused Assessment:   Respiratory - Respiratory WDL: WDL Lung Fields: All Fields Throughout All Lung Fields: clear; equal bilaterally; diminished AV    09:02 Cardiac Cardiac - Capillary Refill, General: less than/equal to 3 secs  Review of Systems (Cardiac) - Cardiac Signs/Symptoms: chest pain; shortness of breath Cardiac Conditions: stent  Cardiac Monitoring -  EKG Monitoring: Yes Cardiac Regularity: Regular Cardiac Rhythm: NSR  Chest Pain Assessment - Chest Pain Location: other (see comments) Chest Pain Radiation: back     Pt placed on 2L O2 for sats of 88%. Does not wear O2 at home.   18G IV placed in upper left arm with ultrasound; multiple ultrasound attempts.   Tests Performed: Labs, CT. Abnormal Results:   Labs Ordered and Resulted from Time of ED Arrival Up to the Time of Departure from the ED   BASIC METABOLIC PANEL - Abnormal; Notable for the following:        Result Value    Glucose 232 (*)     Urea Nitrogen 60 (*)     Creatinine 8.72 (*)     GFR Estimate 6 (*)     GFR Estimate If Black 7 (*)     All other components within normal limits   NT PROBNP INPATIENT - Abnormal; Notable for the following:     N-Terminal Pro BNP Inpatient 82932 (*)     All other components within normal limits   CBC WITH PLATELETS DIFFERENTIAL - Abnormal; Notable for the following:     RBC Count 3.49 (*)     Hemoglobin 10.0 (*)     Hematocrit 31.4 (*)     RDW 16.6 (*)     Platelet Count 88 (*)     Absolute Lymphocytes 0.7 (*)     All other components within normal limits   TROPONIN I   PULSE OXIMETRY NURSING   CARDIAC CONTINUOUS MONITORING   PERIPHERAL IV CATHETER   ISTAT TROPONIN NURSING POCT   TROPONIN POCT   INFLUENZA A/B ANTIGEN     Chest CT, IV contrast only - PE protocol   Final Result   IMPRESSION:      1. No CT evidence of acute pulmonary embolus.   2. Small bilateral effusions with dependent atelectasis.   3. Cardiomegaly.      CHARLEEN JUNIOR MD      XR Chest 2 Views   Final Result   IMPRESSION: Mild diffuse bilateral interstitial prominence, possibly   mild venous congestion. No focal pulmonary opacities otherwise   appreciated. No pleural effusions or pneumothorax. Stable heart size.      CHARLEEN JUNIOR MD        .   Treatments provided: Tylenol  Family Comments: N/A  OBS brochure/video discussed/provided to patient:  Yes  ED Medications:   Medications   acetaminophen (TYLENOL)  tablet 1,000 mg (1,000 mg Oral Given 12/25/17 1000)   iopamidol (ISOVUE-370) solution 500 mL (72 mLs Intravenous Given 12/25/17 1059)   0.9% sodium chloride BOLUS (0 mLs Intravenous Stopped 12/25/17 1110)     Drips infusing:  No  For the majority of the shift, the patient's behavior Green. Interventions performed were N/A.     Severe Sepsis OR Septic Shock Diagnosis Present: No      ED Nurse Name/Phone Number: Rena Ponce,   12:05 PM    RECEIVING UNIT ED HANDOFF REVIEW    Above ED Nurse Handoff Report was reviewed: Yes  Reviewed by: Anette Kent on December 25, 2017 at 1:20 PM

## 2017-12-25 NOTE — ED PROVIDER NOTES
History     Chief Complaint:  Chest Pain, Shortness of Breath    HPI   Donald Raza is a 67 year old male with a complex medical history including ACS, CAD, NSTEMI, hypertension, hyperlipidemia, type 2 diabetes mellitus, ESRD on dialysis, and HIV who presents to the emergency department today for evaluation of chest pain and shortness of breath. The patient reports waking up at 0400 short of breath, nauseous, and with chest pain that radiates from the back to the right side. The patient has a history of this chest pain with reassuring angiograms, and notes that pain is identical to those episodes, last nearly a month ago. Pain does not change with breathing or movement, and seems to worsen with palpation, though he denies any recent straining or heavy lifting. He was given nitro and Zofran by EMS, and reports improvement of pain with nitro. He also reports a subjective fever and a slight cough, but no vomiting, leg swelling, history of clots, recent surgery, or recent travel.    Cardiac Risk Factors:  CAD:                 +  Hypertension :   +  Hyperlipidemia:  +  Diabetes:       +  Tobacco use:     Former smoker    Gender:      Male  Age:        67  Familial Hx of CAD:  +    PE/DVT Risk Factors:   Hx of PE/DVT:   -  Hx of clotting disorder:  -  Hx of cancer:    -  Tobacco use:    Former smoker  Hormone therapy:   -  Pregnancy:    -  Prolonged immobilization:  -  Recent surgery:   -  Recent travel:    -  Familial Hx of PE/DVT:  -    Allergies:  Calcium Acetate  Diagnostic X-Ray Materials  Lisinopril  Sulfa Drugs    Medications:    Mycophenolate  Lantus  Amlodipine  Humalog  Losartan  Imdur  Hydralazine  Albuterol  Omeprazole  Gabapentin  Plavix  Doxercalciferol  Clonazepam  Aldara cream  Terbinafine  Chlorhexadine  Atorvastatin  Epoetin  Venofer  Magnesium  Nitroglycerin  Abacavir  Dolutegravir  Darunavir ethanolate  Ritonavir    Past Medical History:    ACS (acute coronary syndrome) (H)   Allergic rhinitis,  cause unspecified   Bilateral pneumonia   Huang disease   Bradycardia   CAD S/P percutaneous coronary angioplasty   Chest pain   CKD (chronic kidney disease)   Dilated aortic root (H)   ESRD (end stage renal disease) on dialysis (H)   Hemodialysis-associated hypotension   Human immunodeficiency virus (HIV) disease   Hypertension   Hypotension, unspecified hypotension type   Impotence of organic origin   Increased prostate specific antigen (PSA) velocity   Mixed hyperlipidemia   Near syncope   NSTEMI (non-ST elevated myocardial infarction) (H)   Pulmonary HTN   TIA (transient ischemic attack)   Type 2 diabetes mellitus (H)   Unstable angina (H)      Past Surgical History:    Angiogram x2  Coronary stent x4  Colostomy  Cholecystectomy  Appendectomy    Family History:    Heart disease - brother  Kidney disease - sister  Hypertension - sister    Social History:  The patient was accompanied to the ED by EMS.  Smoking Status: Former smoker  Smokeless Tobacco: Never used  Alcohol Use: No  Marital Status:   [2]    Review of Systems   Constitutional: Positive for fever.   Respiratory: Positive for cough and shortness of breath.    Cardiovascular: Positive for chest pain. Negative for leg swelling.   Gastrointestinal: Positive for nausea. Negative for vomiting.   All other systems reviewed and are negative.    Physical Exam     Patient Vitals for the past 24 hrs:   BP Temp Temp src Heart Rate Resp SpO2   12/25/17 1355 171/82 - - - - -   12/25/17 1342 182/82 97.9  F (36.6  C) Oral 83 20 92 %   12/25/17 1330 183/90 - - - - 97 %   12/25/17 1300 179/87 - - 79 - 91 %   12/25/17 1245 167/87 - - 74 - 95 %   12/25/17 1230 168/84 - - 76 - 96 %   12/25/17 1215 (!) 178/92 - - 78 - 95 %   12/25/17 1200 (!) 181/92 - - 79 - 95 %   12/25/17 1145 183/90 - - 77 - 95 %   12/25/17 1142 - - - 76 - 95 %   12/25/17 1139 - - - 81 - (!) 88 %   12/25/17 1130 188/90 - - 81 - 93 %   12/25/17 1119 - - - 78 - 94 %   12/25/17 1118 179/86 - - - - -    12/25/17 0957 - 98.9  F (37.2  C) Temporal - - -   12/25/17 0945 - - - 77 - -   12/25/17 0930 - - - 83 - -   12/25/17 0915 - - - 77 - -   12/25/17 0900 - - - - - 96 %   12/25/17 0845 - - - 77 - 97 %   12/25/17 0830 - - - 90 - 95 %   12/25/17 0815 - - - 86 - 94 %   12/25/17 0800 - - - 84 - -   12/25/17 0745 172/84 - - 85 - 94 %   12/25/17 0739 (!) 178/91 - Oral 96 18 91 %   12/25/17 0730 - - - - - 92 %     Physical Exam  General: Elderly male sitting upright  Eyes: PERRL, Conjunctive within normal limits  ENT: Moist mucous membranes, oropharynx clear.   CV: Normal S1S2, no murmur, rub or gallop. Regular rate and rhythm  Resp: Crackles at the bilateral bases, left greater than right. More diminished on the right. No wheezes, rales or rhonchi. Normal respiratory effort.  GI: Abdomen is soft, nontender and nondistended. No palpable masses. No rebound or guarding.  MSK: No edema. Nontender. Normal active range of motion. Fistula in the left upper arm with palpable thrill. Trace edema bilateral lower legs at the ankles. No calf asymmetry or tenderness to palpation.  Skin: Warm and dry. No rashes or lesions or ecchymoses on visible skin.  Neuro: Alert and oriented. Responds appropriately to all questions and commands. No focal findings appreciated. Normal muscle tone.  Psych: Normal mood and affect. Pleasant.    Emergency Department Course     ECG:  ECG taken at 0732, ECG read at 0739  Normal sinus rhythm  Possible inferior infarct, age undetermined  Abnormal ECG  Rate 92 bpm. ME interval 172. QRS duration 88. QT/QTc 360/445. P-R-T axes 26 -2 47.    Imaging:  Radiology findings were communicated with the patient who voiced understanding of the findings.    Chest CT, IV contrast only  1. No CT evidence of acute pulmonary embolus.  2. Small bilateral effusions with dependent atelectasis.  3. Cardiomegaly.  Reading per radiology    XR Chest 2 Views  Mild diffuse bilateral interstitial prominence, possibly  mild venous  "congestion. No focal pulmonary opacities otherwise  appreciated. No pleural effusions or pneumothorax. Stable heart size.  Reading per radiology    Laboratory:  Laboratory findings were communicated with the patient who voiced understanding of the findings.    Influenza A/B antigen: Negative    CBC: HGB 10.0 (L), PLT 88 (L). (WBC 4.7)   BMP: Glucose 232 (H), BUN 60 (H), Creatinine 8.72 (H), GFR 6 (L), o/w WNL  Troponin POCT (Collected 0855): 0.026  BNP: 62646 (H)  Lipase: 187    Platelet count (Collected 1407): 108 (L)  Glucose by meter (Collected 1357): 150 (H)  Troponin (Collected 1407): 0.027  Hemoglobin A1c: Pending    Interventions:  1000 Acetaminophen 1,000 mg PO     Emergency Department Course:  Nursing notes and vitals reviewed.  0735: I performed an exam of the patient as documented above.   IV was inserted and blood was drawn for laboratory testing, results above.  The patient was sent for a XR Chest 2 Views while in the emergency department, results above.   Patient reassessed. Denies new concerns. States just \"not feeling well\".  Patient sent for CT chest with IV contrast.  1134: I rechecked the patient and updated him on the results of laboratory and imaging studies.  I discussed the treatment plan with the patient and he expressed understanding of this plan and consented to admission.  All questions were answered.  1202: I discussed the patient with Dr. Tang, who will admit the patient to a monitored bed for further evaluation and treatment.    Impression & Plan      Medical Decision Making:  Donald Raza is a 67 year old male with end-stage renal disease on dialysis who presents to the emergency department with concerns for sharp shooting pain in the back into the right chest, associated dyspnea, and generalized unwell feeling.  He was reporting subjective fevers at home.  Here, he is afebrile with no leukocytosis.  Influenza is negative.  No evidence of pneumonia.  He is HIV positive and on " last admission had a low CD4 count, so infection could not be excluded but no evidence of bacterial infection at this time.  He did have an elevated BNP which is not sensitive, but no evidence on my examination of significant congestive heart failure and had an echocardiogram done recently which showed normal ejection fraction.  It seems unlikely to represent ischemic pain and had a recent angiogram which shows reassuring endings.  He felt unwell to go home and it seems reasonable to admit him with unclear source of his symptoms in a complicated male.  It is possible that he requires more volume pulled at his next dialysis.  He felt comfortable with this plan as do I.  All questions were answered to admission.    Diagnosis:    ICD-10-CM    1. Chest pain, unspecified type R07.9    2. Dyspnea, unspecified type R06.00    3. Bilateral pleural effusion J90    4. ESRD (end stage renal disease) on dialysis (H) N18.6     Z99.2      Disposition:   The patient is admitted to Dr. Tang of the hospitalist service in stable condition.    Scribe Disclosure:  ICody, am serving as a scribe at 7:35 AM on 12/25/2017 to document services personally performed by Jill Castro MD, based on my observations and the provider's statements to me.    12/25/2017   [facility] EMERGENCY DEPARTMENT       Jill Castro MD  12/26/17 7419

## 2017-12-25 NOTE — IP AVS SNAPSHOT
MRN:[id number]                      After Visit Summary   12/25/2017    Donald Raza    MRN: [id number]           Thank you!     Thank you for choosing [facility] for your care. Our goal is always to provide you with excellent care. Hearing back from our patients is one way we can continue to improve our services. Please take a few minutes to complete the written survey that you may receive in the mail after you visit. If you would like to speak to someone directly about your visit please contact Patient Relations at [phone]. Thank you!          Patient Information     Date Of Birth          1950        About your hospital stay     You were admitted on:  December 25, 2017 You last received care in the:  [place] [address] [facility]    You were discharged on:  December 28, 2017        Reason for your hospital stay       You were hospitalized for chest pain.  This is likely from your heart but is due to having to low of hemoglobin or red blood cell count.  Symptoms resolved with blood transfusion.  You will need ongoing monitoring of your blood counts and as needed transfusions.  Your losartan and imdur doses have been increased.  Please monitor for low blood sugars at home as you may need adjustment in your insulin if this happens.                  Who to Call     For medical emergencies, please call 911.  For non-urgent questions about your medical care, please call your primary care provider or clinic, [phone]          Attending Provider     Provider Specialty    Jill Castro MD Emergency Medicine    [facility]       Primary Care Provider Office Phone # Fax #    Aida Thomas [phone]      After Care Instructions     Activity       Your activity upon discharge: activity as tolerated            Diet       Follow this diet upon discharge: Orders Placed This Encounter      Snacks/Supplements  Adult: Nepro Oral Supplement; With Meals      Combination Diet Dialysis Diet; 1816-0181 Calories: Moderate Consistent CHO (4-6 CHO units/meal)                  Follow-up Appointments     Follow-up and recommended labs and tests        Follow up with primary care provider, Aida Thomas, within 7 days for follow up of diabetes  Follow up will be arranged with an RENETTA in the cardiology clinic in 1-2 weeks.                  Your next 10 appointments already scheduled     Jan 16, 2018  2:30 PM CST   Return Discharge with DEDRICK Glaser CNP   [facility] ([facility])    [address]  [facility] [address] [phone]            Jan 29, 2018  9:45 AM CST   Return Visit with Arnoldo Diaz MD   [facility] ([facility])    [address]  [facility] [address] [phone]              Additional Services     Follow-Up with Cardiac Advanced Practice Provider                 Pending Results     No orders found from 12/23/2017 to 12/26/2017.            Statement of Approval     Ordered          12/28/17 1222  I have reviewed and agree with all the recommendations and orders detailed in this document.  EFFECTIVE NOW     Approved and electronically signed by:  Ezra Fortune MD             Admission Information     Date & Time Provider Department Dept. Phone    12/25/2017 Jovi Tang MD [place] [address] [facility] [phone]      Your Vitals Were     Blood Pressure Pulse Temperature Respirations Weight Pulse Oximetry    147/66 (BP Location: Right arm) 70 97.7  F (36.5  C) 16 85.9 kg (189 lb 6.4 oz) 96%    BMI (Body Mass Index)                   26.42 kg/m2           MyChart Information     [employer] lets you send messages to your doctor, view your test results, renew your prescriptions, schedule appointments and more. To sign up, go to [email or web address]  ". Click on \"Log in\" on the left side of the screen, which will take you to the Welcome page. Then click on \"Sign up Now\" on the right side of the page.     You will be asked to enter the access code listed below, as well as some personal information. Please follow the directions to create your username and password.     Your access code is: [email or web address]  Expires: 2018  1:13 PM     Your access code will  in 90 days. If you need help or a new code, please call your [facility] clinic or [phone].        Care EveryWhere ID     This is your Care EveryWhere ID. This could be used by other organizations to access your [facility] medical records  [id number]        Equal Access to Services     JOON KERN : Adelaida Dolan, waeric mendiola, qawon kaalmarino núñez, fatoumata olivo. [facility] [phone].    ATENCIÓN: Si habla español, tiene a ayala disposición servicios gratuitos de asistencia lingüística. [facility] [phone].    We comply with applicable federal civil rights laws and [place] laws. We do not discriminate on the basis of race, color, national origin, age, disability, sex, sexual orientation, or gender identity.               Review of your medicines      CONTINUE these medicines which may have CHANGED, or have new prescriptions. If we are uncertain of the size of tablets/capsules you have at home, strength may be listed as something that might have changed.        Dose / Directions    Isosorbide Mononitrate  MG Tb24   This may have changed:    - medication strength  - how much to take   Used for:  Coronary artery disease involving native heart, angina presence unspecified, unspecified vessel or lesion type, Hypertension secondary to other renal disorders        Dose:  120 mg   Take 1 tablet (120 mg) by mouth every evening   Quantity:  30 tablet   Refills:  1       losartan 50 MG tablet   Commonly known as:  COZAAR   This may have changed:    - " medication strength  - how much to take   Used for:  Hypertension secondary to other renal disorders        Dose:  50 mg   Take 1 tablet (50 mg) by mouth every evening   Quantity:  30 tablet   Refills:  1         CONTINUE these medicines which have NOT CHANGED        Dose / Directions    abacavir 300 MG tablet   Commonly known as:  ZIAGEN        Dose:  600 mg   Take 600 mg by mouth every evening   Refills:  0       albuterol 108 (90 BASE) MCG/ACT Inhaler   Commonly known as:  PROAIR HFA/PROVENTIL HFA/VENTOLIN HFA   Used for:  Cough        Dose:  2 puff   Inhale 2 puffs into the lungs every 6 hours as needed for shortness of breath / dyspnea or wheezing   Quantity:  1 Inhaler   Refills:  1       AMLODIPINE BESYLATE PO        Dose:  10 mg   Take 10 mg by mouth daily   Refills:  0       atorvastatin 40 MG tablet   Commonly known as:  LIPITOR        Dose:  40 mg   Take 40 mg by mouth At Bedtime   Refills:  0       B COMPLEX-C-FOLIC ACID PO        Dose:  1 tablet   Take 1 tablet by mouth At Bedtime   Refills:  0       calcium carbonate 500 MG chewable tablet   Commonly known as:  TUMS        Dose:  3 chew tab   Take 3 chew tab by mouth 3 times daily   Refills:  0       chlorhexidine 0.12 % solution   Commonly known as:  PERIDEX        Rinse and gargle 15 ml by mouth twice a day as directed.   Refills:  0       CLONAZEPAM PO        Dose:  0.5 mg   Take 0.5 mg by mouth nightly as needed for anxiety (restless legs)   Refills:  0       CLOPIDOGREL BISULFATE PO        Dose:  75 mg   Take 75 mg by mouth every evening   Refills:  0       dapsone 100 MG tablet        Dose:  100 mg   Take 100 mg by mouth At Bedtime   Refills:  0       dolutegravir 50 MG tablet   Commonly known as:  TIVICAY        Dose:  50 mg   Take 50 mg by mouth At Bedtime   Refills:  0       DOXERCALCIFEROL IV        Dose:  6 mcg   Inject 6 mcg into the vein three times a week (with dialysis)   Refills:  0       epoetin brittni 92275 UNIT/ML injection   Commonly  "known as:  EPOGEN/PROCRIT        Dose:  81192 Units   Inject 11,000 Units Subcutaneous three times a week WITH DIALYSIS   Refills:  0       * gabapentin 300 MG capsule   Commonly known as:  NEURONTIN        Dose:  300 mg   Take 300 mg by mouth every morning   Refills:  0       * gabapentin 300 MG capsule   Commonly known as:  NEURONTIN        Dose:  600 mg   Take 600 mg by mouth every evening   Refills:  0       HUMALOG PEN SC        Take 15 units TID and an additional 2 units if BG is over 150   Refills:  0       HYDRALAZINE HCL PO        Dose:  25 mg   Take 25 mg by mouth 2 times daily   Refills:  0       imiquimod 5 % cream   Commonly known as:  ALDARA        Apply topically as needed   Refills:  0       insulin glargine 100 UNIT/ML injection   Commonly known as:  LANTUS        Dose:  50 Units   Inject 50 Units Subcutaneous daily Takes about noon   Refills:  0       iron sucrose 20 MG/ML injection   Commonly known as:  VENOFER        Dose:  50 mg   Inject 50 mg into the vein once a week WIth dialysis   Refills:  0       lamISIL AT 1 % cream   Generic drug:  terbinafine        Apply topically 2 times daily as needed   Refills:  0       MAGNESIUM OXIDE PO        Dose:  400 mg   Take 400 mg by mouth At Bedtime   Refills:  0       mycophenolate 500 MG tablet   Commonly known as:  GENERIC EQUIVALENT   Used for:  ESRD (end stage renal disease) on dialysis (H)        Dose:  500 mg   Take 1 tablet (500 mg) by mouth 2 times daily On an empty stomach   Refills:  0       NEPRO PO        1-3 times daily   Refills:  0       nitroGLYcerin 0.4 MG sublingual tablet   Commonly known as:  NITROSTAT   Used for:  Coronary artery disease due to lipid rich plaque, Status post coronary angioplasty        One tablet under the tongue every 5 minutes if needed for chest pain. May repeat every 5 minutes for a maximum of 3 doses in 15 minutes\"   Quantity:  25 tablet   Refills:  3       order for DME   Used for:  SOB (shortness of breath), " Generalized muscle weakness        Equipment being ordered: Other: 4WW Treatment Diagnosis: Decreased activity tolerance   Quantity:  1 each   Refills:  0       PREZISTA PO        Dose:  800 mg   Take 800 mg by mouth At Bedtime.   Refills:  0       priLOSEC 40 MG capsule   Generic drug:  omeprazole        Dose:  40 mg   Take 40 mg by mouth daily 1 hour before dinner   Refills:  0       ritonavir 100 MG capsule   Commonly known as:  NORVIR        Dose:  1 capsule   Take 1 capsule by mouth At Bedtime   Refills:  0       * Notice:  This list has 2 medication(s) that are the same as other medications prescribed for you. Read the directions carefully, and ask your doctor or other care provider to review them with you.         Where to get your medicines      These medications were sent to [facility] [address]  [address] [facility] [address]     Phone:  [phone]     Isosorbide Mononitrate  MG Tb24    losartan 50 MG tablet                Protect others around you: Learn how to safely use, store and throw away your medicines at www.disposemymeds.org.             Medication List: This is a list of all your medications and when to take them. Check marks below indicate your daily home schedule. Keep this list as a reference.      Medications           Morning Afternoon Evening Bedtime As Needed    abacavir 300 MG tablet   Commonly known as:  ZIAGEN   Take 600 mg by mouth every evening   Last time this was given:  600 mg on 12/27/2017  8:34 PM   Next Dose Due:  12/28 tonight                                      albuterol 108 (90 BASE) MCG/ACT Inhaler   Commonly known as:  PROAIR HFA/PROVENTIL HFA/VENTOLIN HFA   Inhale 2 puffs into the lungs every 6 hours as needed for shortness of breath / dyspnea or wheezing                                AMLODIPINE BESYLATE PO   Take 10 mg by mouth daily   Last time this was given:  10 mg on 12/28/2017  2:31 PM   Next  Dose Due:  12/29                                   atorvastatin 40 MG tablet   Commonly known as:  LIPITOR   Take 40 mg by mouth At Bedtime   Last time this was given:  40 mg on 12/27/2017  9:58 PM   Next Dose Due:  12/28, tonight                                   B COMPLEX-C-FOLIC ACID PO   Take 1 tablet by mouth At Bedtime                                   calcium carbonate 500 MG chewable tablet   Commonly known as:  TUMS   Take 3 chew tab by mouth 3 times daily   Last time this was given:  1,500 mg on 12/28/2017  2:31 PM   Next Dose Due:  12/28, tonight                                      chlorhexidine 0.12 % solution   Commonly known as:  PERIDEX   Rinse and gargle 15 ml by mouth twice a day as directed.                                   CLONAZEPAM PO   Take 0.5 mg by mouth nightly as needed for anxiety (restless legs)                                   CLOPIDOGREL BISULFATE PO   Take 75 mg by mouth every evening   Last time this was given:  75 mg on 12/27/2017  8:28 PM                                dapsone 100 MG tablet   Take 100 mg by mouth At Bedtime   Last time this was given:  100 mg on 12/27/2017  9:58 PM                                dolutegravir 50 MG tablet   Commonly known as:  TIVICAY   Take 50 mg by mouth At Bedtime   Last time this was given:  50 mg on 12/27/2017  9:58 PM   Next Dose Due:  12/28, tonight                                    DOXERCALCIFEROL IV   Inject 6 mcg into the vein three times a week (with dialysis)                                epoetin brittni 20176 UNIT/ML injection   Commonly known as:  EPOGEN/PROCRIT   Inject 11,000 Units Subcutaneous three times a week WITH DIALYSIS   Last time this was given:  12/28/2017 12:37 AM                                * gabapentin 300 MG capsule   Commonly known as:  NEURONTIN   Take 300 mg by mouth every morning   Last time this was given:  300 mg on 12/28/2017  2:32 PM   Next Dose Due:  12/29                                   * gabapentin 300  MG capsule   Commonly known as:  NEURONTIN   Take 600 mg by mouth every evening   Last time this was given:  300 mg on 12/28/2017  2:32 PM                                   HUMALOG PEN SC   Take 15 units TID and an additional 2 units if BG is over 150                                HYDRALAZINE HCL PO   Take 25 mg by mouth 2 times daily   Last time this was given:  25 mg on 12/28/2017  2:31 PM                                imiquimod 5 % cream   Commonly known as:  ALDARA   Apply topically as needed                                insulin glargine 100 UNIT/ML injection   Commonly known as:  LANTUS   Inject 50 Units Subcutaneous daily Takes about noon   Last time this was given:  20 Units on 12/26/2017  9:08 AM                                iron sucrose 20 MG/ML injection   Commonly known as:  VENOFER   Inject 50 mg into the vein once a week WIth dialysis                                Isosorbide Mononitrate  MG Tb24   Take 1 tablet (120 mg) by mouth every evening   Last time this was given:  120 mg on 12/27/2017  8:28 PM   Next Dose Due:  12/28, tonight                                      lamISIL AT 1 % cream   Apply topically 2 times daily as needed   Generic drug:  terbinafine                                   losartan 50 MG tablet   Commonly known as:  COZAAR   Take 1 tablet (50 mg) by mouth every evening   Last time this was given:  50 mg on 12/27/2017  8:28 PM   Next Dose Due:  12/28, tonight                                    MAGNESIUM OXIDE PO   Take 400 mg by mouth At Bedtime                                mycophenolate 500 MG tablet   Commonly known as:  GENERIC EQUIVALENT   Take 1 tablet (500 mg) by mouth 2 times daily On an empty stomach                                   NEPRO PO   1-3 times daily                                nitroGLYcerin 0.4 MG sublingual tablet   Commonly known as:  NITROSTAT   One tablet under the tongue every 5 minutes if needed for chest pain. May repeat every 5 minutes for  "a maximum of 3 doses in 15 minutes\"                                   order for DME   Equipment being ordered: Other: 4WW Treatment Diagnosis: Decreased activity tolerance                                PREZISTA PO   Take 800 mg by mouth At Bedtime.   Last time this was given:  800 mg on 12/27/2017  9:58 PM   Next Dose Due:  12/28, tonight                                    priLOSEC 40 MG capsule   Take 40 mg by mouth daily 1 hour before dinner   Last time this was given:  40 mg on 12/27/2017  4:28 PM   Generic drug:  omeprazole   Next Dose Due:  12/28, tonight before dinner                                    ritonavir 100 MG capsule   Commonly known as:  NORVIR   Take 1 capsule by mouth At Bedtime                                   * Notice:  This list has 2 medication(s) that are the same as other medications prescribed for you. Read the directions carefully, and ask your doctor or other care provider to review them with you.      "

## 2017-12-25 NOTE — PROGRESS NOTES
Lab Results   Component Value Date    POTASSIUM 5.0 12/25/2017     Lab Results   Component Value Date    HGB 10.0 12/25/2017      Results for GREGORIO BRUSH (MRN [id number]) as of 12/25/2017 17:20   Ref. Range 1/18/2017 08:30   Hep B Surface Agn Latest Ref Range: NR  Nonreactive   Hepatitis B Surface Antibody Latest Ref Range: <8.00 m[IU]/mL 20.44 (H)       Hemodialysis Note:      All machine safety checks completed and passed.  Total chlorine checks less than 0.1ppm   All connections secured, saline line double clamped.  Venous and arterial parameters set  Report rec'd from Cristopher Mendiola RN    This is an extra emergent Rx, for fluid removal.  Rec'd pt per bed acc'd by transport team. Consent obtained for dialysis Rx this hospitalization.  Hepatitis B labs verified on machine log from previous patient.  Good circulation to fistula arm. Time out taken.    LUAF cannulated with 15 Ga needles with no difficulty.        Pt ran 1.5 hours on a K 2 bath, with 2L removed. Blood flow rate of 400 ml/min was obtained.   PRE-WEIGHT:89.3kgs,    TARGET WEIGHT 88.5kgs,     POST-WT 87.3kgs.      Complications: None.   Education regarding ESRD given to patient with good understanding.     Protocol explained to staff RN regarding possibility of incapacitated dialysis RN.  Vascular Access: Aseptic prep done for both on/off  Total Heparin given: On Heparin gtt    Meds given: None.       Transducers checked Q 15 minutes, remain clear throughout Rx.  Machine water alarms in place and functioning.  Total blood volume processed:35.1L  Hemostasis of needle sites achieved after 10 minutes. Patient dialyzes at [place] Q T-Th-Sat.  POST ASSESSMENTS: Skin warm and dry, alert, denies discomfort, Lungs diminished, Resp rate regular, apical rate regular. No edema.  See flowsheet in EPIC for further details.  Report given to Cristopher Madison RN.   [facility]

## 2017-12-25 NOTE — ED NOTES
Bed: ED08  Expected date: 12/25/17  Expected time: 7:15 AM  Means of arrival: Ambulance  Comments:  A 593 66 yo M chest pain

## 2017-12-25 NOTE — PLAN OF CARE
Problem: Cardiac: ACS (Acute Coronary Syndrome) (Adult)  Goal: Signs and Symptoms of Listed Potential Problems Will be Absent, Minimized or Managed (Cardiac: ACS)  Signs and symptoms of listed potential problems will be absent, minimized or managed by discharge/transition of care (reference Cardiac: ACS (Acute Coronary Syndrome) (Adult) CPG).  Outcome: No Change  A/O x 4. /82 all other VSS. Dyspnea on exertion noted. Pt. Complaining of R sided chest pain that radiates to the back. MD notified. Fistula in L arm. 2 LPM nasal cannula. Assist x 1.

## 2017-12-26 LAB
ANION GAP SERPL CALCULATED.3IONS-SCNC: 10 MMOL/L (ref 3–14)
BASOPHILS # BLD AUTO: 0 10E9/L (ref 0–0.2)
BASOPHILS NFR BLD AUTO: 0.4 %
BLD PROD TYP BPU: NORMAL
BLD UNIT ID BPU: 0
BLOOD PRODUCT CODE: NORMAL
BPU ID: NORMAL
BUN SERPL-MCNC: 52 MG/DL (ref 7–30)
CALCIUM SERPL-MCNC: 9.3 MG/DL (ref 8.5–10.1)
CHLORIDE SERPL-SCNC: 100 MMOL/L (ref 94–109)
CO2 SERPL-SCNC: 27 MMOL/L (ref 20–32)
CREAT SERPL-MCNC: 7.9 MG/DL (ref 0.66–1.25)
DIFFERENTIAL METHOD BLD: ABNORMAL
EOSINOPHIL # BLD AUTO: 0.1 10E9/L (ref 0–0.7)
EOSINOPHIL NFR BLD AUTO: 2 %
ERYTHROCYTE [DISTWIDTH] IN BLOOD BY AUTOMATED COUNT: 16.8 % (ref 10–15)
GFR SERPL CREATININE-BSD FRML MDRD: 7 ML/MIN/1.7M2
GLUCOSE BLDC GLUCOMTR-MCNC: 121 MG/DL (ref 70–99)
GLUCOSE BLDC GLUCOMTR-MCNC: 140 MG/DL (ref 70–99)
GLUCOSE BLDC GLUCOMTR-MCNC: 149 MG/DL (ref 70–99)
GLUCOSE BLDC GLUCOMTR-MCNC: 191 MG/DL (ref 70–99)
GLUCOSE BLDC GLUCOMTR-MCNC: 87 MG/DL (ref 70–99)
GLUCOSE SERPL-MCNC: 149 MG/DL (ref 70–99)
HCT VFR BLD AUTO: 21.5 % (ref 40–53)
HGB BLD-MCNC: 6.7 G/DL (ref 13.3–17.7)
IMM GRANULOCYTES # BLD: 0 10E9/L (ref 0–0.4)
IMM GRANULOCYTES NFR BLD: 0.4 %
INTERPRETATION ECG - MUSE: NORMAL
LMWH PPP CHRO-ACNC: 0.25 IU/ML
LMWH PPP CHRO-ACNC: <0.1 IU/ML
LYMPHOCYTES # BLD AUTO: 0.8 10E9/L (ref 0.8–5.3)
LYMPHOCYTES NFR BLD AUTO: 17 %
MCH RBC QN AUTO: 28.6 PG (ref 26.5–33)
MCHC RBC AUTO-ENTMCNC: 31.2 G/DL (ref 31.5–36.5)
MCV RBC AUTO: 92 FL (ref 78–100)
MONOCYTES # BLD AUTO: 0.4 10E9/L (ref 0–1.3)
MONOCYTES NFR BLD AUTO: 7.8 %
NEUTROPHILS # BLD AUTO: 3.2 10E9/L (ref 1.6–8.3)
NEUTROPHILS NFR BLD AUTO: 72.4 %
NRBC # BLD AUTO: 0 10*3/UL
NRBC BLD AUTO-RTO: 0 /100
PLATELET # BLD AUTO: 105 10E9/L (ref 150–450)
PLATELET # BLD AUTO: 109 10E9/L (ref 150–450)
POTASSIUM SERPL-SCNC: 5.5 MMOL/L (ref 3.4–5.3)
RBC # BLD AUTO: 2.34 10E12/L (ref 4.4–5.9)
SODIUM SERPL-SCNC: 137 MMOL/L (ref 133–144)
TRANSFUSION STATUS PATIENT QL: NORMAL
TRANSFUSION STATUS PATIENT QL: NORMAL
WBC # BLD AUTO: 4.5 10E9/L (ref 4–11)

## 2017-12-26 PROCEDURE — 00000146 ZZHCL STATISTIC GLUCOSE BY METER IP

## 2017-12-26 PROCEDURE — 86901 BLOOD TYPING SEROLOGIC RH(D): CPT | Performed by: INTERNAL MEDICINE

## 2017-12-26 PROCEDURE — A9270 NON-COVERED ITEM OR SERVICE: HCPCS | Mod: GY | Performed by: INTERNAL MEDICINE

## 2017-12-26 PROCEDURE — 25000128 H RX IP 250 OP 636: Performed by: INTERNAL MEDICINE

## 2017-12-26 PROCEDURE — 86850 RBC ANTIBODY SCREEN: CPT | Performed by: INTERNAL MEDICINE

## 2017-12-26 PROCEDURE — 86900 BLOOD TYPING SEROLOGIC ABO: CPT | Performed by: INTERNAL MEDICINE

## 2017-12-26 PROCEDURE — 25000132 ZZH RX MED GY IP 250 OP 250 PS 637: Mod: GY | Performed by: INTERNAL MEDICINE

## 2017-12-26 PROCEDURE — P9016 RBC LEUKOCYTES REDUCED: HCPCS | Performed by: INTERNAL MEDICINE

## 2017-12-26 PROCEDURE — 12000007 ZZH R&B INTERMEDIATE

## 2017-12-26 PROCEDURE — 99223 1ST HOSP IP/OBS HIGH 75: CPT | Performed by: INTERNAL MEDICINE

## 2017-12-26 PROCEDURE — 86923 COMPATIBILITY TEST ELECTRIC: CPT | Performed by: INTERNAL MEDICINE

## 2017-12-26 PROCEDURE — 85025 COMPLETE CBC W/AUTO DIFF WBC: CPT | Performed by: INTERNAL MEDICINE

## 2017-12-26 PROCEDURE — 40000275 ZZH STATISTIC RCP TIME EA 10 MIN

## 2017-12-26 PROCEDURE — 36415 COLL VENOUS BLD VENIPUNCTURE: CPT | Performed by: INTERNAL MEDICINE

## 2017-12-26 PROCEDURE — 25000131 ZZH RX MED GY IP 250 OP 636 PS 637: Mod: GY | Performed by: INTERNAL MEDICINE

## 2017-12-26 PROCEDURE — 63400005 ZZH RX 634: Performed by: INTERNAL MEDICINE

## 2017-12-26 PROCEDURE — 40000983 ZZH STATISTIC HFNC ADULT NON-CPAP

## 2017-12-26 PROCEDURE — 99233 SBSQ HOSP IP/OBS HIGH 50: CPT | Performed by: INTERNAL MEDICINE

## 2017-12-26 PROCEDURE — 80048 BASIC METABOLIC PNL TOTAL CA: CPT | Performed by: INTERNAL MEDICINE

## 2017-12-26 PROCEDURE — 90937 HEMODIALYSIS REPEATED EVAL: CPT

## 2017-12-26 PROCEDURE — 85520 HEPARIN ASSAY: CPT | Performed by: INTERNAL MEDICINE

## 2017-12-26 PROCEDURE — 94660 CPAP INITIATION&MGMT: CPT

## 2017-12-26 RX ORDER — HEPARIN SODIUM 1000 [USP'U]/ML
500 INJECTION, SOLUTION INTRAVENOUS; SUBCUTANEOUS
Status: COMPLETED | OUTPATIENT
Start: 2017-12-26 | End: 2017-12-28

## 2017-12-26 RX ORDER — POTASSIUM CHLORIDE 1500 MG/1
20 TABLET, EXTENDED RELEASE ORAL
Status: DISCONTINUED | OUTPATIENT
Start: 2017-12-26 | End: 2017-12-28 | Stop reason: HOSPADM

## 2017-12-26 RX ORDER — SODIUM CHLORIDE 9 MG/ML
INJECTION, SOLUTION INTRAVENOUS CONTINUOUS
Status: DISCONTINUED | OUTPATIENT
Start: 2017-12-26 | End: 2017-12-28 | Stop reason: HOSPADM

## 2017-12-26 RX ORDER — LOSARTAN POTASSIUM 50 MG/1
50 TABLET ORAL EVERY EVENING
Status: DISCONTINUED | OUTPATIENT
Start: 2017-12-26 | End: 2017-12-28 | Stop reason: HOSPADM

## 2017-12-26 RX ORDER — HYDRALAZINE HYDROCHLORIDE 25 MG/1
25 TABLET, FILM COATED ORAL 2 TIMES DAILY
Status: DISCONTINUED | OUTPATIENT
Start: 2017-12-26 | End: 2017-12-28 | Stop reason: HOSPADM

## 2017-12-26 RX ORDER — LIDOCAINE 40 MG/G
CREAM TOPICAL
Status: DISCONTINUED | OUTPATIENT
Start: 2017-12-26 | End: 2017-12-28 | Stop reason: HOSPADM

## 2017-12-26 RX ORDER — HEPARIN SODIUM 1000 [USP'U]/ML
500 INJECTION, SOLUTION INTRAVENOUS; SUBCUTANEOUS CONTINUOUS
Status: DISCONTINUED | OUTPATIENT
Start: 2017-12-26 | End: 2017-12-28 | Stop reason: HOSPADM

## 2017-12-26 RX ORDER — ALBUMIN, HUMAN INJ 5% 5 %
250 SOLUTION INTRAVENOUS
Status: DISCONTINUED | OUTPATIENT
Start: 2017-12-26 | End: 2017-12-28 | Stop reason: HOSPADM

## 2017-12-26 RX ADMIN — HYDRALAZINE HYDROCHLORIDE 25 MG: 25 TABLET ORAL at 21:16

## 2017-12-26 RX ADMIN — HEPARIN SODIUM 1500 UNITS/HR: 10000 INJECTION, SOLUTION INTRAVENOUS at 22:24

## 2017-12-26 RX ADMIN — HYDRALAZINE HYDROCHLORIDE 25 MG: 25 TABLET ORAL at 10:08

## 2017-12-26 RX ADMIN — ABACAVIR 600 MG: 300 TABLET, FILM COATED ORAL at 21:32

## 2017-12-26 RX ADMIN — CLOPIDOGREL 75 MG: 75 TABLET, FILM COATED ORAL at 21:16

## 2017-12-26 RX ADMIN — AMLODIPINE BESYLATE 10 MG: 10 TABLET ORAL at 09:48

## 2017-12-26 RX ADMIN — SODIUM CHLORIDE 250 ML: 9 INJECTION, SOLUTION INTRAVENOUS at 13:16

## 2017-12-26 RX ADMIN — LOSARTAN POTASSIUM 50 MG: 50 TABLET, FILM COATED ORAL at 21:16

## 2017-12-26 RX ADMIN — DARUNAVIR 800 MG: 800 TABLET, FILM COATED ORAL at 21:32

## 2017-12-26 RX ADMIN — GABAPENTIN 600 MG: 300 CAPSULE ORAL at 21:15

## 2017-12-26 RX ADMIN — DAPSONE 100 MG: 100 TABLET ORAL at 21:16

## 2017-12-26 RX ADMIN — INSULIN GLARGINE 20 UNITS: 100 INJECTION, SOLUTION SUBCUTANEOUS at 09:08

## 2017-12-26 RX ADMIN — ATORVASTATIN CALCIUM 40 MG: 40 TABLET, FILM COATED ORAL at 21:16

## 2017-12-26 RX ADMIN — ASPIRIN 325 MG: 325 TABLET, DELAYED RELEASE ORAL at 09:06

## 2017-12-26 RX ADMIN — OMEPRAZOLE 40 MG: 20 CAPSULE, DELAYED RELEASE ORAL at 15:55

## 2017-12-26 RX ADMIN — HEPARIN SODIUM 1500 UNITS/HR: 10000 INJECTION, SOLUTION INTRAVENOUS at 09:43

## 2017-12-26 RX ADMIN — RITONAVIR 100 MG: 100 TABLET, FILM COATED ORAL at 21:32

## 2017-12-26 RX ADMIN — MYCOPHENOLATE MOFETIL 500 MG: 250 CAPSULE ORAL at 09:52

## 2017-12-26 RX ADMIN — MYCOPHENOLATE MOFETIL 500 MG: 250 CAPSULE ORAL at 21:16

## 2017-12-26 RX ADMIN — Medication 4000 UNITS: at 09:46

## 2017-12-26 RX ADMIN — ISOSORBIDE MONONITRATE 90 MG: 60 TABLET, EXTENDED RELEASE ORAL at 21:15

## 2017-12-26 RX ADMIN — ERYTHROPOIETIN 15000 UNITS: 10000 INJECTION, SOLUTION INTRAVENOUS; SUBCUTANEOUS at 13:08

## 2017-12-26 RX ADMIN — CALCIUM CARBONATE (ANTACID) CHEW TAB 500 MG 1500 MG: 500 CHEW TAB at 21:15

## 2017-12-26 RX ADMIN — CALCIUM CARBONATE (ANTACID) CHEW TAB 500 MG 1500 MG: 500 CHEW TAB at 15:55

## 2017-12-26 RX ADMIN — GABAPENTIN 300 MG: 300 CAPSULE ORAL at 09:52

## 2017-12-26 RX ADMIN — SODIUM CHLORIDE: 9 INJECTION, SOLUTION INTRAVENOUS at 09:57

## 2017-12-26 RX ADMIN — DOLUTEGRAVIR SODIUM 50 MG: 50 TABLET, FILM COATED ORAL at 21:32

## 2017-12-26 ASSESSMENT — ACTIVITIES OF DAILY LIVING (ADL)
ADLS_ACUITY_SCORE: 11
ADLS_ACUITY_SCORE: 9
ADLS_ACUITY_SCORE: 10

## 2017-12-26 NOTE — PROGRESS NOTES
"SPIRITUAL HEALTH SERVICES  SPIRITUAL ASSESSMENT Progress Note  [facility] 3rd floor Med Surg    PRIMARY FOCUS:     Goals of care    Symptom/pain management    Support for coping    ILLNESS CIRCUMSTANCES:   Reviewed documentation. Reflective conversation shared with Donald which integrated elements of illness and family narratives.     Context of Serious Illness/Symptom(s) - Donald is here with chest pain and is having diagnostic testing to determine what is causing it. He is also a dialysis patient.    Persons/Resources Involved - Wife    DISTRESS:     Emotional/Existential/Relational Distress - Donald talked about having dialysis last January and said after that \"I don't remember anything.\" He said he woke up 4 weeks later in ICU.    Spiritual/Scientology Distress - None expressed.    Social/Cultural/Economic Distress - None expressed.    SPIRIT (Coping):     Anglican/Shania - Anabaptism    Spiritual Practice(s) - Prayer    Emotional/Existential/Relational Connections - Donald talked about his wife, his sister, 4 brothers, 2 daughters and his grandchildren.    SENSE-MAKING:    Goals of Care - Restorative. Donald is hoping that the doctors can help determine how to help him so he can return home. Although he said he \"is good whenever God calls him home.\"    Meaning/Sense-Making - Donald talked about trying to keep a positive attitude in life and even helping others through that. He repeated several times, \"do we really have a choice?\" He gave a narrative of his life going back to when he was 15 years old and moved to the [place] from [place]. He talked about living in [place], and [place] and how he met and  his wife. He said his wife is very supportive as well as his 2 daughters. He said one daughter (from [place]) is about to have a baby any day. Donald repeatedly said, \"if I live today, I do, and if God decides for me to be done, then I will be done. Do I have a choice?\" Donald said he is willing to do whatever the " "doctors ask him to do and isn't asking to die, but said \"if that happens, I am good.\"    PLAN: Spiritual health services remain available. .      Anne Espinoza  Chaplain Resident  Pager [phone]    "

## 2017-12-26 NOTE — PROGRESS NOTES
[facility]    Hospitalist Progress Note    Date of Service (when I saw the patient): 12/26/2017    Assessment & Plan     67-year-old man with a complex past medical history including coronary artery disease who woke up in the night with chest pressure and SOB. He has a history of well controlled HIV infection, end-stage renal disease, on hemodialysis, diabetes mellitus, known CAD, pulmonary hypertension and dyslipidemia. He has had multiple coronary interventions in the past, his last coronary angiogram was about 2 months ago and there was question of restenosis in diagonal although it did not require intervention at the time.     1. Acute hypoxic respiratory failure and chest pain - I suspect this is a combination of flash pulmonary edema due to elevated BP and also his anemia. His pain is better now and his oxygen requirements are improving  -- Plan to see how he does after blood transfusion for anemia and volume removal on dialysis. Initially, there was thought for angiogram and concern for unstable angina, although after his morning labs showed anemia, it is deferred for now. Appreciate cardiology and nephrology consultation     # Acute on chronic anemia - Discussed with Dr. Hamilton. The Hb of 10 from yesterday is likely erroneous, as patient's baseline is in 7-8 range, and was just checked at dialysis a week ago. His chronic anemia is likely due to HIV, AOCD, ESRD. Over time, it appears that he has almost developed a transfusion dependant anemia. Not very responsive to EPO   -- Hb of 6.7 today, plan for 1 unit RBC in dialysis, no signs of bleeding, monitor     2. HTN/CAD - resume on amlodipine 10 mg daily, losartan increased to 50 mg daily, Imdur 90 daily, hydralazine 25 BID. Also on asa/plavix , atorvastatin     3.  End-stage renal disease.  on HD. We will request Nephrology consultation to direct his hemodialysis.     4.  HIV.  Resume baseline home medications     4.  Insulin-dependant  Diabetes mellitus.  We will continue his Lantus and premeal insulin and monitor blood sugars. Was given a lowe dose this morning as he was NPO for possible angiogram, will increase Lantus back to baseline dose of 50 units     5.  Possible viral illness.  He does describe some nasal congestion and mild cough. Influenza Ag neg. Will check PCR    DVT Prophylaxis: Heparin    Code Status: Full Code    Disposition: Expected discharge in 1-2 days .    [facility]    Interval History   Chart reviewed and patient seen. Case discussed with nursing staff.     Patient is feeling improved, chest pain is much better and almost resolved, still gets intermittent twinges some times, breathing is more comfortable, feels like the High flow oxygen helped, No nausea, vomiting or diarrhea. No other complaints voiced.       -Data reviewed today: I reviewed all new labs and imaging results over the last 24 hours. I personally reviewed .    Physical Exam   Temp: 97.5  F (36.4  C) Temp src: Oral BP: 161/85   Heart Rate: 74 Resp: 18 SpO2: 96 % O2 Device: Nasal cannula Oxygen Delivery: 5 LPM  Vitals:    12/26/17 0609   Weight: 88.3 kg (194 lb 9.6 oz)     Vital Signs with Ranges  Temp:  [96.5  F (35.8  C)-98.5  F (36.9  C)] 97.5  F (36.4  C)  Heart Rate:  [70-85] 74  Resp:  [16-20] 18  BP: (126-180)/(56-91) 161/85  FiO2 (%):  [50 %-90 %] 50 %  SpO2:  [82 %-100 %] 96 %  I/O last 3 completed shifts:  In: 366 [P.O.:120; I.V.:246]  Out: 150 [Urine:150]      Constitutional: Awake, alert, cooperative, no apparent distress   Respiratory: Mild crackles at lung base, no wheezing noted, symmetric chest rise bilaterally   Cardiovascular: Regular rate and rhythm, normal S1 and S2, no loud murmur   Abdomen: Bowel sounds present, soft, non-distended, non-tender, no rebound or guarding is noted   Skin: No exanthems noted on exposed areas, no cyanosis, dry to touch   Neuro: Alert and oriented x3, no focal weakness or numbness   Extremities: No pitting  edema, normal range of motion, skin is warm to touch with signs of adequate peripheral perfusion    Psychiatric: Calm, not agitated, no active hallucinations             Medications     heparin (porcine)       NaCl 10 mL/hr at 12/26/17 0957     HEParin 1,500 Units/hr (12/26/17 0943)       sodium chloride 0.9%  1,000-2,000 mL Hemodialysis Machine Once     gelatin absorbable  1 each Topical During Hemodialysis (from stock)     heparin (porcine)  500 Units Hemodialysis Machine Once in dialysis     sodium chloride (PF)  3 mL Intracatheter Q8H     aspirin EC  325 mg Oral Daily     losartan (COZAAR) tablet 50 mg  50 mg Oral QPM     hydrALAZINE (APRESOLINE) tablet 25 mg  25 mg Oral BID     abacavir  600 mg Oral QPM     amLODIPine (NORVASC) tablet 10 mg  10 mg Oral Daily     atorvastatin  40 mg Oral At Bedtime     calcium carbonate  1,500 mg Oral TID     clopidogrel (PLAVIX) tablet 75 mg  75 mg Oral QPM     dapsone  100 mg Oral At Bedtime     darunavir  800 mg Oral At Bedtime     dolutegravir  50 mg Oral At Bedtime     gabapentin  300 mg Oral QAM     gabapentin  600 mg Oral QPM     insulin glargine  30 Units Subcutaneous QAM     isosorbide mononitrate  90 mg Oral QPM     mycophenolate  500 mg Oral BID     omeprazole  40 mg Oral Daily     ritonavir  100 mg Oral At Bedtime     insulin aspart  1-10 Units Subcutaneous TID AC     insulin aspart  1-7 Units Subcutaneous At Bedtime     insulin aspart  5 Units Subcutaneous TID w/meals     - MEDICATION INSTRUCTIONS for Dialysis Patients -   Does not apply See Admin Instructions     albuterol  2.5 mg Nebulization Once       Data   All new lab data and imaging results from today have been reviewed       Recent Labs  Lab 12/26/17  0631 12/25/17  1706 12/25/17  1407 12/25/17  0923 12/25/17  0856 12/25/17  0855   WBC 4.5  --   --  4.7  --   --    HGB 6.7*  --   --  10.0*  --   --    MCV 92  --   --  90  --   --    *  109*  --  108* 88*  --   --      --   --   --   --  139    POTASSIUM 5.5*  --   --   --   --  5.0   CHLORIDE 100  --   --   --   --  103   CO2 27  --   --   --   --  23   BUN 52*  --   --   --   --  60*   CR 7.90*  --   --   --   --  8.72*   ANIONGAP 10  --   --   --   --  13   PRABHA 9.3  --   --   --   --  9.0   *  --   --   --   --  232*   LIPASE  --   --   --   --   --  187   TROPI  --  0.026 0.027  --   --  0.026   TROPONIN  --   --   --   --  0.02  --        No results found for this or any previous visit (from the past 24 hour(s)).

## 2017-12-26 NOTE — PROGRESS NOTES
RT-Pt remains on HFNC 40L, 50%. Weaning fio2 as tolerates. RR 18. Bs diminished. Spo2 95%. RT will continue to follow.

## 2017-12-26 NOTE — CONSULTS
See dictation.    ESRD of longstanding.  Now admitted with suspected unstable angina and dyspnea.  Short dialysis 12/25 with UF 2L helped resp sx's somewhat.  Today's labs include higher K+, 5.5.  Plan dialysis now for 2 hrs, to improve chemistries and remove more fluid.  Then to cath lab for cor. angio.  Discussed with Dr. Rodriguez.    Thanks.    Pankaj Hamilton MD  Nephrology; [facility]  [phone]

## 2017-12-26 NOTE — PROGRESS NOTES
Inpatient Dialysis Progress Note           Assessment and Plan:   ESRD, status quo.  Stable run today.  Expect good chemical exchange and improved fluid balance by end of run.  Plan for coronary angio today abandoned when AM Hgb of 6.7 was compared to 12/19 Hgb at  [place] unit, 8.1, and likely erroneous Hgb of 10 yesterday on admn.  Hgb has been steadily falling from >9 since last transfusion in November, despite ongoing EPO Rx.  Await impact on pt's admn sx's of today's RBC transfusion.  Reconsider cor angio if still having angina and dyspnea with higher Hgb.         Unstable angina pectoris (H)    * No resolved hospital problems. *               Interval History:   no new complaints and doing well.  Re-examined during stable run.  BP stable throughout, despite vigorous fluid removal.  Transfusion during run uncomplicated.                   Dialysis Details:   Current weight: 88.3 kg (actual weight)  Dry Weight: TBD  Dialysis Temp: 36.5  C  Access Device: AVF  Access Site: left  Dialyzer: Optiflux 160  Dialysis Bath: K+   Sodium Profile: no  UF Goal: 3.5L  Blood Flow Rate (mL/min): 400  Total Treatment Time (hrs): 3.5  Heparin: Heparin infusion from earlier    Medications:  EPO dose: 25572  Zemplar: no  IV Fe: no            Medications:       sodium chloride 0.9%  1,000-2,000 mL Hemodialysis Machine Once     gelatin absorbable  1 each Topical During Hemodialysis (from stock)     heparin (porcine)  500 Units Hemodialysis Machine Once in dialysis     sodium chloride (PF)  3 mL Intracatheter Q8H     aspirin EC  325 mg Oral Daily     losartan (COZAAR) tablet 50 mg  50 mg Oral QPM     hydrALAZINE (APRESOLINE) tablet 25 mg  25 mg Oral BID     [START ON 12/27/2017] insulin glargine  50 Units Subcutaneous QAM     abacavir  600 mg Oral QPM     amLODIPine (NORVASC) tablet 10 mg  10 mg Oral Daily     atorvastatin  40 mg Oral At Bedtime     calcium carbonate  1,500 mg Oral TID     clopidogrel (PLAVIX) tablet 75 mg  75 mg  Oral QPM     dapsone  100 mg Oral At Bedtime     darunavir  800 mg Oral At Bedtime     dolutegravir  50 mg Oral At Bedtime     gabapentin  300 mg Oral QAM     gabapentin  600 mg Oral QPM     isosorbide mononitrate  90 mg Oral QPM     mycophenolate  500 mg Oral BID     omeprazole  40 mg Oral Daily     ritonavir  100 mg Oral At Bedtime     insulin aspart  1-10 Units Subcutaneous TID AC     insulin aspart  1-7 Units Subcutaneous At Bedtime     insulin aspart  5 Units Subcutaneous TID w/meals     - MEDICATION INSTRUCTIONS for Dialysis Patients -   Does not apply See Admin Instructions     albuterol  2.5 mg Nebulization Once       heparin (porcine)       NaCl 10 mL/hr at 12/26/17 0957     HEParin 1,500 Units/hr (12/26/17 0943)                    Physical Exam:       Vital Sign Ranges  Temp:  [96.5  F (35.8  C)-98.5  F (36.9  C)] 97.4  F (36.3  C)  Heart Rate:  [68-85] 68  Resp:  [16-20] 18  BP: (126-180)/(56-91) 167/85  FiO2 (%):  [50 %-90 %] 50 %  SpO2:  [85 %-100 %] 96 %    Weight, current: 88.3 kg (actual weight)  Weight change:     I/O last 3 completed shifts:  In: 366 [P.O.:120; I.V.:246]  Out: 150 [Urine:150]    Physical Exam:   General:  Patient comfortable, in no apparent distress.  Awake, alert, oriented x3.  Neck:  Supple, no JVD.  Lungs:  Clear to auscultation bilaterally.  Cardiac:  Regular rate and rhythm, no murmurs, rub, or gallops.  Abdomen:  Soft, nontender, physiologic sounds.  Extremities:  Trace edema.  2+ pulses.  Skin:  Warm, dry.  Neurologic:  No focal deficits.             Data:        Lab Results   Component Value Date     12/26/2017    Lab Results   Component Value Date    CHLORIDE 100 12/26/2017    Lab Results   Component Value Date    BUN 52 (H) 12/26/2017      Lab Results   Component Value Date    POTASSIUM 5.5 (H) 12/26/2017    Lab Results   Component Value Date    CO2 27 12/26/2017    Lab Results   Component Value Date    CR 7.90 (H) 12/26/2017        Lab Results   Component Value  Date     12/26/2017     12/25/2017     11/26/2017     Lab Results   Component Value Date    POTASSIUM 5.5 (H) 12/26/2017    POTASSIUM 5.0 12/25/2017    POTASSIUM 4.2 11/26/2017     Lab Results   Component Value Date    CHLORIDE 100 12/26/2017    CHLORIDE 103 12/25/2017    CHLORIDE 98 11/26/2017     Lab Results   Component Value Date    CO2 27 12/26/2017    CO2 23 12/25/2017    CO2 27 11/26/2017     Lab Results   Component Value Date    CR 7.90 (H) 12/26/2017    CR 8.72 (H) 12/25/2017    CR 6.97 (H) 11/26/2017     Lab Results   Component Value Date    BUN 52 (H) 12/26/2017    BUN 60 (H) 12/25/2017    BUN 41 (H) 11/26/2017     Lab Results   Component Value Date    HGB 6.7 (LL) 12/26/2017    HGB 10.0 (L) 12/25/2017    HGB 8.3 (L) 11/26/2017     Lab Results   Component Value Date    PH 7.38 01/07/2017    PO2 262 (H) 01/07/2017    PCO2 42 01/07/2017    HCO3 25 01/07/2017    SARANYA 2.5 01/06/2017           Pankaj Hamilton MD  Nephrology; [facility]  [phone]

## 2017-12-26 NOTE — PROGRESS NOTES
Potassium   Date Value Ref Range Status   12/26/2017 5.5 (H) 3.4 - 5.3 mmol/L Final     Lab Results   Component Value Date    HGB 6.7 12/26/2017     Weight: 88.3 kg (194 lb 9.6 oz)  POST WT  DIALYSIS PROCEDURE NOTE  Hepatitis status of previous patient on machine log was checked and verified ok to use with this patients hepatitis status.  Patient dialyzed for 3.5 hrs. on a 2 K bath with a net fluid removal of  3.5L.  A BFR of 450 ml/min was obtained via a LUE AVF using 15 gauge needles.    The patient was seen by Dr. Hamilton during the treatment.  Total heparin received during the treatment: on gtt. Sites held x 10 min then  pressure drsgs applied.  Meds  Given:Epo, 1 units PRBCs Complications: None.  Procedure and ESRD teaching done and questions answered. See flowsheet in EPIC for further details and post assessment.  Machine water alarm in place and functioning.  Pt returned via WC  Vascular Access: Aseptic prep done for both on/off.  Report received from: DANIEL Kent RN  Report given to: DANIEL Kent RN  HEPATITIS B SURFACE ANTIGEN Non-Reactive DATE 1/18/17   HEPATITIS B SURFACE ANTIBODY Immune DATE 1/18/17  Chlorine/Chloramine water system checked every 4 hours.  Outpatient Dialysis at [facility]

## 2017-12-26 NOTE — SIGNIFICANT EVENT
Per lab hgb 6.7  I spoke c , nephrology re this.  Per him there is no need to transfuse at this point.  He was also going to speak c cardiology, Dr. Rodriguez re this.  I will fyi page Dr. Downing with this value and notify the primary RN.

## 2017-12-26 NOTE — PLAN OF CARE
Problem: Cardiac: ACS (Acute Coronary Syndrome) (Adult)  Goal: Signs and Symptoms of Listed Potential Problems Will be Absent, Minimized or Managed (Cardiac: ACS)  Signs and symptoms of listed potential problems will be absent, minimized or managed by discharge/transition of care (reference Cardiac: ACS (Acute Coronary Syndrome) (Adult) CPG).   A/O x 4. K+ 5.5. Angio cancelled for today d/t priority of dialysis. Hgb 6.7. Pt. Receiving 1 unit of blood in dialysis. Heparin gtt infusing 15 ml/hr. Pt. Was on FIO2 but put on nasal cannula for dialysis, O2 sats remaining stable. Pt. Denies chest pain at this time. , 121- has been NPO all day for the possible angio that got cancelled. Will be back from dialysis around 1530. Follow up with diet orders.

## 2017-12-26 NOTE — PLAN OF CARE
Problem: Cardiac: ACS (Acute Coronary Syndrome) (Adult)  Goal: Signs and Symptoms of Listed Potential Problems Will be Absent, Minimized or Managed (Cardiac: ACS)  Signs and symptoms of listed potential problems will be absent, minimized or managed by discharge/transition of care (reference Cardiac: ACS (Acute Coronary Syndrome) (Adult) CPG).   Outcome: No Change  [id number]: A&O x 4, lethargic, sleeping through out this shift. No C/o chest pain. Cont on HFNC 40L, 50%, sats mid 90's, weaning off Fio2 as tolerates, RT following. SBP in 150's, afebrile. Tele SR HR 70's, lung sounds coarse/diminished. Hep drip infusing to R upper arm at 12mL/hr, fistula to left arm, drg intact. Nephrology following, BNP elevated, Cardiology consult due to recurrent chest pain. . Alarms on for safety, up with A 1-2. Plan for dialysis this morning. Will cont with POC.

## 2017-12-26 NOTE — PROGRESS NOTES
Hb returned at 6.7 g/dl. Will transfuse patient to see if symptoms improve as this alone could cause worsening of symptomatology. Coronary angiogram cancelled. Discussed case with Dr Hamilton from nephrology and Dr Jordan. x

## 2017-12-26 NOTE — CONSULTS
CARDIOLOGY CONSULTATION      REFERRING PHYSICIAN:  Hospitalist Service.      INDICATION FOR CARDIAC CONSULTATION:  Non-ST elevation myocardial infarct and chest pain.      HISTORY OF PRESENT ILLNESS:  It is my pleasure to see your patient, Donald Raza, was admitted yesterday with chest discomfort.  He describes the sudden onset of back discomfort that radiated around his right side toward the chest area.  The discomfort was described as a stabbing type discomfort.  However, he did feel that this was similar to the discomfort he had prior to previous interventions when he had a non-ST elevation myocardial infarct.  The discomfort was relieved somewhat by nitroglycerin.  His cardiac enzymes were not raised, but were in the borderline range at 0.026, 0.027 and 0.026.  The patient has had had multiple previous coronary interventions.  He has had stenting of the left anterior descending artery and sizable first diagonal artery.  He has had numerous interventions to the diagonal artery.  He has also a cutting balloon intervention.  His most recent coronary angiogram, which I personally reviewed, was performed in mid October.  This showed that the LAD stent was widely patent.  It does appear that there was a small area of the first diagonal artery at its ostium that is not fully covered by the stent.  In that area, there appears to be some restenosis.  However, fractional flows performed in October did not show any evidence of flow limitation.        The patient this morning is complaining of some stabbing discomfort in the right chest area.  The patient is on dialysis for end-stage renal disease.  The patient in the past was on carvedilol, but developed significant bradycardia, so he is not on beta blockers at present.      PAST MEDICAL HISTORY:   1.  Coronary artery disease as described above.   2.  End-stage renal disease as described above.   3.  HIV.   4.  Diabetes mellitus.   5.  Diverticulitis with resection,  colostomy and subsequent colostomy takedown.   6.  Laparoscopic cholecystectomy.   7.  Appendectomy.   8.  Dyslipidemia.   9.  Pulmonary hypertension.   10.  Transient ischemic attack.     11.  Right-sided pneumonia.      FAMILY HISTORY:  Brother had heart disease.  Sister had kidney disease, and another sister has hypertension.      SOCIAL HISTORY:  He was a smoker in the past, but stopped smoking.  He lives with his wife.      MEDICATIONS:   1. 600 mg every evening.   2.  Albuterol 2.5 mg nebulizer once.   3.  Amlodipine 10 mg per day.   4.  Atorvastatin 40 mg at bedtime.   5.  Calcium carbonate 1500 mg 3 times a day.   6.  Plavix 75 mg per day.   7.  Dapsone 100 mg orally at bedtime.   8.  Prezista 800 mg at bedtime.     9.  Dolutegravir 50 mg at bedtime.   10.  Gabapentin 300 mg every morning and 600 mg every evening.   11.  Heparin bolus plus infusion.   12.  Insulin sliding scale.     13.  NovoLog 5 units subcutaneously 3 times a day with meals.   14.  Lantus 30 units subcutaneously every morning.   15.  Isosorbide mononitrate 90 mg every evening.   16.  Losartan 25 mg every evening.   17.  Mycophenolate 500 mg 2 times a day.   18.  Omeprazole 40 mg per day.   19.  Norvir 100 mg orally at bedtime.      ALLERGIES:  Lisinopril, sulfa drugs and calcium acetate.      REVIEW OF SYSTEMS:   CONSTITUTIONAL:  Tiredness.   EYES:  He wears spectacles.   ENT:  Negative.   CARDIOVASCULAR:  As above.   RESPIRATORY:  As above.   GASTROINTESTINAL:  Nil.   GENITOURINARY:  Renal failure.  He has a shunt in his left arm.   NEUROLOGICAL:  Nil.   PSYCHIATRIC:  Nil.   ENDOCRINE:  Diabetes mellitus.   HEMATOLYMPHATIC:  Nil.   ALLERGY/IMMUNOLOGY:  As above.      PHYSICAL EXAMINATION:   GENERAL:  He is a very pleasant man.  He is in no apparent distress.   VITAL SIGNS:  His blood pressure is 142/71.  Pulse is 71 beats per minute, sinus rhythm on the monitor.  Temperature is 96.5.   HEENT:  Unremarkable.   NECK:  His jugular venous  pulse does not appear to be raised.  It was difficult to hear listen to his carotids because he is on continuous nasal oxygen, which was quite loud.  I will listen later when his oxygen has been removed.  His trachea is not deviated.  Thyroid gland is not enlarged.   HEART:  His apex beat is impalpable.  Heart sounds 1 and 2 are normal.  He has a soft 1/6 systolic ejection murmur heard in the aortic area.   ABDOMEN:  Unremarkable.  No hepatosplenomegaly.  No masses or bruits.     EXTREMITIES:  His pedal pulses are 1+.  No peripheral edema is noted.   NEUROLOGIC:  He moves all 4 limbs appropriately with no obvious sensory or motor loss.     PSYCHIATRIC:  He is fully oriented to time, place and person with a normal affect.      LABORATORY INVESTIGATIONS:  Troponins are as described above.  Sodium is 139, potassium 5.0, BUN 60 and creatinine 8.72.  ProBNP is raised at 14,577.  White cell count is 4.7, hemoglobin is 10.0 and platelet count is reduced at 88,000.      IMAGING:  CT scan of the chest showed no evidence of pulmonary embolism.  Small bilateral pleural effusions were present.  Cardiomegaly was noted.  Chest x-ray performed yesterday showed mild diffuse bilateral interstitial prominences, possibly consistent with mild venous congestion.      IMPRESSION:   1.  The chest discomfort is similar to the discomfort that he had previously when he had a myocardial infarction and intervention to the LAD and diagonal arteries.  This is a patient who has had multiple interventions to the diagonal artery.  Most recent cardiac catheterization in October showed that there was restenosis, but not flow limiting.  The cardiac enzymes are not raised, but the discomfort certainly sounds like his previous discomfort, and it also responded to nitroglycerin.   2.  Renal failure, on dialysis.   3.  HIV.   4.  Borderline hypertension.   5.  Evidence of possible venous congestion with bilateral pleural effusions.      PLAN:     1.  I do  think a cardiac catheterization is indicated in this patient with recurrent chest discomfort and discomfort that is similar to that for which had previous interventions.  I explained the risks and benefits of the procedure to the patient, including the risk of stroke, heart attack, death, bleeding, bruising, hematoma formation, vascular damage, dye allergy, dye nephropathy, the risks associated with conscious sedation, including aspiration and intubation, and the risks associated with blood transfusions.  I also explained the special risks associated with coronary interventions, including increased risk of death, myocardial infarction and emergency bypass surgery.  The patient told me he understood why we are doing the procedure.  He told me he understood the risks associated with the procedure.  Finally, he told me to that he wished to proceed.     2.  We will increase the dose of the losartan to 50 mg to try and reduce his blood pressure.     3.  We will also repeat his repeat his echocardiogram.  The reason for this is that he does have evidence of possible heart failure, although his ejection fraction was normal 2 months ago, but he does have evidence of some diastolic dysfunction on the previous echocardiogram.      It was a pleasure to be involved in the care of this very nice patient.  I look forward to seeing him again.         MAE STALEY MD, [facility]             D: 2017 08:38   T: 2017 11:34   MT: [id number]      Name:     GREGORIO BRUSH   MRN:      [id number]        Account:       [id number]   :      1950           Consult Date:  2017      Document: [id number]

## 2017-12-27 ENCOUNTER — APPOINTMENT (OUTPATIENT)
Dept: CARDIOLOGY | Facility: CLINIC | Age: 67
DRG: 682 | End: 2017-12-27
Attending: INTERNAL MEDICINE
Payer: MEDICARE

## 2017-12-27 LAB
ANION GAP SERPL CALCULATED.3IONS-SCNC: 5 MMOL/L (ref 3–14)
BUN SERPL-MCNC: 38 MG/DL (ref 7–30)
CALCIUM SERPL-MCNC: 9.2 MG/DL (ref 8.5–10.1)
CHLORIDE SERPL-SCNC: 97 MMOL/L (ref 94–109)
CO2 SERPL-SCNC: 31 MMOL/L (ref 20–32)
CREAT SERPL-MCNC: 5.63 MG/DL (ref 0.66–1.25)
ERYTHROCYTE [DISTWIDTH] IN BLOOD BY AUTOMATED COUNT: 16 % (ref 10–15)
FLUAV+FLUBV RNA SPEC QL NAA+PROBE: NEGATIVE
FLUAV+FLUBV RNA SPEC QL NAA+PROBE: NEGATIVE
GFR SERPL CREATININE-BSD FRML MDRD: 10 ML/MIN/1.7M2
GLUCOSE BLDC GLUCOMTR-MCNC: 145 MG/DL (ref 70–99)
GLUCOSE BLDC GLUCOMTR-MCNC: 148 MG/DL (ref 70–99)
GLUCOSE BLDC GLUCOMTR-MCNC: 155 MG/DL (ref 70–99)
GLUCOSE BLDC GLUCOMTR-MCNC: 171 MG/DL (ref 70–99)
GLUCOSE BLDC GLUCOMTR-MCNC: 247 MG/DL (ref 70–99)
GLUCOSE SERPL-MCNC: 148 MG/DL (ref 70–99)
HCT VFR BLD AUTO: 26.5 % (ref 40–53)
HGB BLD-MCNC: 8.2 G/DL (ref 13.3–17.7)
LMWH PPP CHRO-ACNC: 0.35 IU/ML
MAGNESIUM SERPL-MCNC: 2.2 MG/DL (ref 1.6–2.3)
MCH RBC QN AUTO: 28.2 PG (ref 26.5–33)
MCHC RBC AUTO-ENTMCNC: 30.9 G/DL (ref 31.5–36.5)
MCV RBC AUTO: 91 FL (ref 78–100)
PHOSPHATE SERPL-MCNC: 5.1 MG/DL (ref 2.5–4.5)
PLATELET # BLD AUTO: 122 10E9/L (ref 150–450)
POTASSIUM SERPL-SCNC: 4.8 MMOL/L (ref 3.4–5.3)
RBC # BLD AUTO: 2.91 10E12/L (ref 4.4–5.9)
RSV RNA SPEC NAA+PROBE: NEGATIVE
SODIUM SERPL-SCNC: 133 MMOL/L (ref 133–144)
SPECIMEN SOURCE: NORMAL
WBC # BLD AUTO: 4.7 10E9/L (ref 4–11)

## 2017-12-27 PROCEDURE — A9270 NON-COVERED ITEM OR SERVICE: HCPCS | Mod: GY | Performed by: INTERNAL MEDICINE

## 2017-12-27 PROCEDURE — 84100 ASSAY OF PHOSPHORUS: CPT | Performed by: INTERNAL MEDICINE

## 2017-12-27 PROCEDURE — 40000983 ZZH STATISTIC HFNC ADULT NON-CPAP

## 2017-12-27 PROCEDURE — 85027 COMPLETE CBC AUTOMATED: CPT | Performed by: INTERNAL MEDICINE

## 2017-12-27 PROCEDURE — 93321 DOPPLER ECHO F-UP/LMTD STD: CPT

## 2017-12-27 PROCEDURE — 99232 SBSQ HOSP IP/OBS MODERATE 35: CPT | Performed by: INTERNAL MEDICINE

## 2017-12-27 PROCEDURE — 80048 BASIC METABOLIC PNL TOTAL CA: CPT | Performed by: INTERNAL MEDICINE

## 2017-12-27 PROCEDURE — 36415 COLL VENOUS BLD VENIPUNCTURE: CPT | Performed by: INTERNAL MEDICINE

## 2017-12-27 PROCEDURE — 25000132 ZZH RX MED GY IP 250 OP 250 PS 637: Mod: GY | Performed by: INTERNAL MEDICINE

## 2017-12-27 PROCEDURE — 93321 DOPPLER ECHO F-UP/LMTD STD: CPT | Mod: 26 | Performed by: INTERNAL MEDICINE

## 2017-12-27 PROCEDURE — 25000131 ZZH RX MED GY IP 250 OP 636 PS 637: Mod: GY | Performed by: INTERNAL MEDICINE

## 2017-12-27 PROCEDURE — 87631 RESP VIRUS 3-5 TARGETS: CPT | Performed by: INTERNAL MEDICINE

## 2017-12-27 PROCEDURE — 93325 DOPPLER ECHO COLOR FLOW MAPG: CPT | Mod: 26 | Performed by: INTERNAL MEDICINE

## 2017-12-27 PROCEDURE — 83735 ASSAY OF MAGNESIUM: CPT | Performed by: INTERNAL MEDICINE

## 2017-12-27 PROCEDURE — 93308 TTE F-UP OR LMTD: CPT | Mod: 26 | Performed by: INTERNAL MEDICINE

## 2017-12-27 PROCEDURE — 12000007 ZZH R&B INTERMEDIATE

## 2017-12-27 PROCEDURE — 00000146 ZZHCL STATISTIC GLUCOSE BY METER IP

## 2017-12-27 PROCEDURE — 85520 HEPARIN ASSAY: CPT | Performed by: INTERNAL MEDICINE

## 2017-12-27 PROCEDURE — 40000275 ZZH STATISTIC RCP TIME EA 10 MIN

## 2017-12-27 RX ORDER — ISOSORBIDE MONONITRATE 60 MG/1
120 TABLET, EXTENDED RELEASE ORAL EVERY EVENING
Status: DISCONTINUED | OUTPATIENT
Start: 2017-12-27 | End: 2017-12-28 | Stop reason: HOSPADM

## 2017-12-27 RX ADMIN — ASPIRIN 325 MG: 325 TABLET, DELAYED RELEASE ORAL at 09:50

## 2017-12-27 RX ADMIN — HYDRALAZINE HYDROCHLORIDE 25 MG: 25 TABLET ORAL at 09:51

## 2017-12-27 RX ADMIN — CLOPIDOGREL 75 MG: 75 TABLET, FILM COATED ORAL at 20:28

## 2017-12-27 RX ADMIN — INSULIN ASPART 1 UNITS: 100 INJECTION, SOLUTION INTRAVENOUS; SUBCUTANEOUS at 13:32

## 2017-12-27 RX ADMIN — MYCOPHENOLATE MOFETIL 500 MG: 250 CAPSULE ORAL at 09:51

## 2017-12-27 RX ADMIN — INSULIN ASPART 1 UNITS: 100 INJECTION, SOLUTION INTRAVENOUS; SUBCUTANEOUS at 09:53

## 2017-12-27 RX ADMIN — DAPSONE 100 MG: 100 TABLET ORAL at 21:58

## 2017-12-27 RX ADMIN — GABAPENTIN 300 MG: 300 CAPSULE ORAL at 09:50

## 2017-12-27 RX ADMIN — LOSARTAN POTASSIUM 50 MG: 50 TABLET, FILM COATED ORAL at 20:28

## 2017-12-27 RX ADMIN — AMLODIPINE BESYLATE 10 MG: 10 TABLET ORAL at 09:50

## 2017-12-27 RX ADMIN — ACETAMINOPHEN 650 MG: 325 TABLET, FILM COATED ORAL at 08:39

## 2017-12-27 RX ADMIN — OMEPRAZOLE 40 MG: 20 CAPSULE, DELAYED RELEASE ORAL at 16:28

## 2017-12-27 RX ADMIN — CALCIUM CARBONATE (ANTACID) CHEW TAB 500 MG 1500 MG: 500 CHEW TAB at 09:50

## 2017-12-27 RX ADMIN — ABACAVIR 600 MG: 300 TABLET, FILM COATED ORAL at 20:34

## 2017-12-27 RX ADMIN — DARUNAVIR 800 MG: 800 TABLET, FILM COATED ORAL at 21:58

## 2017-12-27 RX ADMIN — CALCIUM CARBONATE (ANTACID) CHEW TAB 500 MG 1500 MG: 500 CHEW TAB at 21:58

## 2017-12-27 RX ADMIN — Medication 1 CAPSULE: at 21:58

## 2017-12-27 RX ADMIN — CALCIUM CARBONATE (ANTACID) CHEW TAB 500 MG 1500 MG: 500 CHEW TAB at 16:28

## 2017-12-27 RX ADMIN — GABAPENTIN 600 MG: 300 CAPSULE ORAL at 20:28

## 2017-12-27 RX ADMIN — ISOSORBIDE MONONITRATE 120 MG: 60 TABLET, EXTENDED RELEASE ORAL at 20:28

## 2017-12-27 RX ADMIN — DOLUTEGRAVIR SODIUM 50 MG: 50 TABLET, FILM COATED ORAL at 21:58

## 2017-12-27 RX ADMIN — RITONAVIR 100 MG: 100 TABLET, FILM COATED ORAL at 21:58

## 2017-12-27 RX ADMIN — INSULIN ASPART 5 UNITS: 100 INJECTION, SOLUTION INTRAVENOUS; SUBCUTANEOUS at 17:12

## 2017-12-27 RX ADMIN — ATORVASTATIN CALCIUM 40 MG: 40 TABLET, FILM COATED ORAL at 21:58

## 2017-12-27 RX ADMIN — INSULIN GLARGINE 35 UNITS: 100 INJECTION, SOLUTION SUBCUTANEOUS at 09:48

## 2017-12-27 RX ADMIN — HYDRALAZINE HYDROCHLORIDE 25 MG: 25 TABLET ORAL at 20:28

## 2017-12-27 RX ADMIN — ACETAMINOPHEN 650 MG: 325 TABLET, FILM COATED ORAL at 12:43

## 2017-12-27 RX ADMIN — MYCOPHENOLATE MOFETIL 500 MG: 250 CAPSULE ORAL at 20:28

## 2017-12-27 ASSESSMENT — ACTIVITIES OF DAILY LIVING (ADL)
ADLS_ACUITY_SCORE: 9
ORAL_HYGIENE: INDEPENDENT
GROOMING: INDEPENDENT
ADLS_ACUITY_SCORE: 11
DRESS: WITH ASSISTANCE
ADLS_ACUITY_SCORE: 9

## 2017-12-27 NOTE — PROGRESS NOTES
Infection Prevention:    Enteric precautions discontinued. Patient positive for Cdiff Jan, 2017; no current loose stools. Patient placed on Contact and Droplet for ruling out RSV. If negative, precautions can be removed. Please contact Infection Prevention with any questions/concerns at *[phone].    Mragaret De Jesus, ICP

## 2017-12-27 NOTE — PROGRESS NOTES
[facility] Cardiology Progress Note            Interval History:   Musculoskeletal pain. Reproducible on pressing under right scapula. Cath not performed yesterday as Hb came back at 6 which in itself could precipitate angina. Also K was 5.5. Transfused up to 8.2. No recurrence of CP which radiated to front. Troponins normal.              Medications:       insulin glargine  35 Units Subcutaneous QAM     sodium chloride 0.9%  1,000-2,000 mL Hemodialysis Machine Once     gelatin absorbable  1 each Topical During Hemodialysis (from stock)     heparin (porcine)  500 Units Hemodialysis Machine Once in dialysis     sodium chloride (PF)  3 mL Intracatheter Q8H     aspirin EC  325 mg Oral Daily     losartan (COZAAR) tablet 50 mg  50 mg Oral QPM     hydrALAZINE (APRESOLINE) tablet 25 mg  25 mg Oral BID     abacavir  600 mg Oral QPM     amLODIPine (NORVASC) tablet 10 mg  10 mg Oral Daily     atorvastatin  40 mg Oral At Bedtime     calcium carbonate  1,500 mg Oral TID     clopidogrel (PLAVIX) tablet 75 mg  75 mg Oral QPM     dapsone  100 mg Oral At Bedtime     darunavir  800 mg Oral At Bedtime     dolutegravir  50 mg Oral At Bedtime     gabapentin  300 mg Oral QAM     gabapentin  600 mg Oral QPM     isosorbide mononitrate  90 mg Oral QPM     mycophenolate  500 mg Oral BID     omeprazole  40 mg Oral Daily     ritonavir  100 mg Oral At Bedtime     insulin aspart  1-10 Units Subcutaneous TID AC     insulin aspart  1-7 Units Subcutaneous At Bedtime     insulin aspart  5 Units Subcutaneous TID w/meals     - MEDICATION INSTRUCTIONS for Dialysis Patients -   Does not apply See Admin Instructions     albuterol  2.5 mg Nebulization Once                 Physical Exam:   Blood pressure 118/60, temperature 97.8  F (36.6  C), temperature source Oral, resp. rate 20, weight 85.9 kg (189 lb 6.4 oz), SpO2 99 %.  Rhythm:  Sinus   Constitutional:   awake, alert, cooperative, no apparent distress, and appears stated age     Hematologic /  Lymphatic:   no cervical lymphadenopathy     Lungs:   No increased work of breathing, good air exchange, clear to auscultation bilaterally, no crackles or wheezing     Cardiovascular:   regular rate and rhythm, normal S1 and S2, S4 present, no murmur noted and no edema                    Data:          Lab Results   Component Value Date     12/27/2017     12/26/2017     12/25/2017    Lab Results   Component Value Date    CHLORIDE 97 12/27/2017    CHLORIDE 100 12/26/2017    CHLORIDE 103 12/25/2017    Lab Results   Component Value Date    BUN 38 12/27/2017    BUN 52 12/26/2017    BUN 60 12/25/2017      Lab Results   Component Value Date    POTASSIUM 4.8 12/27/2017    POTASSIUM 5.5 12/26/2017    POTASSIUM 5.0 12/25/2017    Lab Results   Component Value Date    CO2 31 12/27/2017    CO2 27 12/26/2017    CO2 23 12/25/2017    Lab Results   Component Value Date    CR 5.63 12/27/2017    CR 7.90 12/26/2017    CR 8.72 12/25/2017        Lab Results   Component Value Date    HGB 8.2 (L) 12/27/2017    HGB 6.7 (LL) 12/26/2017    HGB 10.0 (L) 12/25/2017     Lab Results   Component Value Date     (L) 12/27/2017     (L) 12/26/2017     (L) 12/26/2017     Lab Results   Component Value Date    TROPONIN 0.02 12/25/2017    TROPONIN 0.02 04/15/2017    TROPONIN 0.02 05/19/2016    TROPI 0.026 12/25/2017    TROPI 0.027 12/25/2017    TROPI 0.026 12/25/2017            EKG and Imaging results:    I have reviewed recent EKGs and imaging studies.         Assessment and Plan:   Assessment:   Problem List    Angina. Probably precipitated by low HB. No recurrence of pain.  Musculoskeletal right back pain.  Low HB ; transfused up to 8.2  ESRD on dialysis.    * No resolved hospital problems. *       Plan:   Add low dose nitrates.   Best to keep HB above 8  If recurrent pain would cath.       Attestation:  I have reviewed today's vital signs, notes, medications, labs and imaging.  Care coordination / counseling  time: 20 minutes  Total time: 30 minutes   Adam Rodriguez MD, MD 12/27/2017  8:52 AM

## 2017-12-27 NOTE — CONSULTS
NEPHROLOGY CONSULTATION      REQUESTING PHYSICIAN:  Dr. Jovi Tang.      PRIMARY CARE PHYSICIAN:  Dr. Trupti Thomas      PRIMARY NEPHROLOGIST:  Dr. Dominik Chavez      Donald Raza is a 67-year-old man with end-stage renal disease who dialyzes under the care of Dr. Chavez at the [facility].  Our group has been asked to see him since his admission yesterday with relatively sudden onset of back and right-sided chest discomfort.  The patient describes this discomfort as having a stabbing character.  He did feel, however, that it was at least consistent with previous symptoms during times when he had evidence of coronary ischemia.  The patient has a long history of coronary artery disease with multiple previous interventions percutaneously.  He has not had coronary bypass surgery.  Mr. Raza's ESRD is attributed to hypertension and type 2 diabetes mellitus.  He has a chronic anemia which is poorly responsive to erythropoietin therapy.  He has a diagnosis of HIV disease which is currently well controlled on usual oral antiretroviral medication.  He has been a smoker in the past but has not used tobacco products for several years.  His medication schedule includes an antihypertensive combination of amlodipine, hydralazine, losartan and isosorbide.  His diabetes is managed with glargine and short-acting insulin.  He is on low dose gabapentin for neuropathy symptoms.  He is a potential candidate for renal transplant and because of an ongoing need for red blood cell transfusions he is on mycophenolate 500 mg twice daily to prevent antibody development due to transfusions.  He is on high dose erythropoietin, 15,000 units with every dialysis treatment, and is on intermittent IV iron in the dialysis facility.      Mr. Raza's admission labs included normal electrolytes with a potassium of 5.0.  His BUN and creatinine were 80 and 8.72.  His hemoglobin was 10 with a white blood cell count and platelet count of  4700 and 108,000 respectively.  He does have a chronic thrombocytopenia of about this degree and white blood cell counts also are somewhat low in the normal range.  The hemoglobin of 10 is questionable.  The patient's last outpatient hemoglobin was 8.1 approximately 1 week ago.  He was last given a red blood cell transfusion in November and hemoglobin in late November was about 9.5.  It fell to 8.5 early in December.      Mr. Raza was in some respiratory distress on admission.  He had a short dialysis run at that time approximately 1-1/2 hours in length and during that time had ultrafiltration of 2 liters of fluid with some improvement in his respiratory distress.      Earlier today he was seen in consultation by Dr. Adam Rodriguez for his presumed unstable angina.  He did have 2 negative troponin I values after admission yesterday and his electrocardiogram showed no new diagnostic changes.  He had a sinus rhythm with a possible old inferior infarct and some voltage increase across the precordium.  ST segments and T waves look normal.  His chest x-ray showed some mild pulmonary congestion and a stable heart size.  He also had a CT scan of the chest with contrast that showed no sign of pulmonary emboli.  Bilateral dependent atelectasis and small bilateral effusions were noted.  There was mild cardiomegaly.  There was no significant lymphadenopathy.      Labs today showed a marked change in hemoglobin without any evidence of bleeding since yesterday, hemoglobin was down to 6.7, white blood cell count and platelet count about the same as yesterday.  Electrolytes showed a worsening of his potassium to 5.5.  Electrolytes otherwise normal.  BUN and creatinine slightly better than yesterday 52 and 7.9.  Arrangements were made initially for a short dialysis treatment in anticipation of his going for repeat coronary arteriography.  He had last had an angiogram in October of this year and was felt to have at that time  no significant critical stenoses but still substantial coronary disease.  When the very low hemoglobin value was received from the lab, a decision was made to postpone the coronary arteriogram and to administer 1 unit of packed red blood cells and observe for improvement in chest pain and respiratory symptoms with a higher hemoglobin.      Mr. Raza's antihypertensive combination of amlodipine, isosorbide and losartan were continued.  His hydralazine has been resumed today.  His usual HIV medications have been continued.  He continues on a daily aspirin and Clopidogrel.      PHYSICAL EXAMINATION:   GENERAL:  The patient is examined both before and during his dialysis.  He is alert and calm, in no distress.   VITAL SIGNS:  Initial vital signs include a normal temperature, a blood pressure of 112/71, a pulse of 85 and regular, respirations 16.  The patient has a saturation of 95% on high flow nasal oxygen.     NECK:  Neck veins are not distended.   HEENT:  Exam otherwise seems normal.   LUNGS:  Fields are essentially clear.  There are few scattered rhonchi at the bases, but no wheezing.   HEART:  Reveals no murmur, rub or gallop.  Peripheral pulses are strong and symmetric throughout.   ABDOMEN:  Negative without tenderness, masses or organomegaly.   EXTREMITIES:  Warm.  The patient's simple AV fistula in the left upper arm looks fine and has a good bruit.  There is no peripheral edema.   NEUROLOGIC:  Brief neurologic exam is negative.      ASSESSMENT:  Mr. Bellas ESRD overall seems well managed.  His last full dialysis in the outpatient setting was on 12/23 and was uncomplicated.  He finished that run near his estimated dry weight.  We have continued his run today to the usual time of 3.5 hours, and he has had good chemical exchange throughout with a flow rate of greater than 400 mL per minute.  He has been successfully ultra filtered of another 3.5 liters of water.  He has tolerated his 1 red cell unit without  complication.  At the end of his dialysis, his oxygen requirements are less.  Our plan will be to continue monitoring his fluid balance and blood chemistries closely.  For now his dialysis schedule will remain every Tuesday, Thursday and Saturday.  Further evaluation of signs and symptoms of coronary ischemia will be continued and coronary arteriography reconsidered should his symptoms not be resolved with the increase in hemoglobin after today's transfusion.      Thank you for the opportunity to assist again in Mr. Brush's care.  Our group will follow as appropriate during the remainder of his hospital stay.         SOFIE PETERSON MD             D: 2017 19:02   T: 2017 21:11   MT: [id number]      Name:     GREGORIO BRUSH   MRN:      [id number]        Account:       [id number]   :      1950           Consult Date:  2017      Document: [id number]       cc: Aida Chavez MD

## 2017-12-27 NOTE — CONSULTS
Care Transition Initial Assessment - RN    Reason For Consult: care coordination/care conference, discharge planning   Met with: Patient and Family.    DATA   Active Problems:    Unstable angina pectoris (H)       Cognitive Status: alert and oriented.  Primary Care Clinic Name: [facility]  Primary Care MD Name: Aida Thomas  Contact information and PCP information verified: Yes    Lives With: child(jean), adult, spouse, sibling(s)     Quality Of Family Relationships: supportive, helpful, involved  Description of Support System: Supportive, Involved   Who is your support system?: Wife, Children, Sibling(s)   Support Assessment: Adequate family and caregiver support     Insurance concerns: No Insurance issues identified    ASSESSMENT  Patient currently receives the following services:  Met with pt and wife at bedside. Pt was admitted with CP/CHF with BNP 77723. His MUSHTAQ is elevated. He is identified as high risk for readmission as this is his 7th admit this year. He was recently admitted from 11/21-11/28 with CHF/PNA. Pt lives at home with his wife, daughter and brother. He dialyzes at [facility] on [facility] and has a SAGE fistula. He does not have home care services and does not use home oxygen. His daughter is his PCA. He is independent at home and uses a cane when out of the house. He still does some driving but his daughter and wife do most of the driving and take him to dialysis. He states everything has been going well at home and he does not anticipate any additional needs. He is hoping to dc tomorrow after dialysis. He follows closely with cardiology as OP and is well educated about his heart condition.       Identified issues/concerns regarding health management: none    PLAN  Patient/family is agreeable to the plan?  Yes  Patient anticipates discharging to home with family .        Patient anticipates needs for home equipment: No  Discharge Planner   Discharge Plans in progress: home with  family  Barriers to discharge plan: none  Plan/Disposition: Home   Appointments: Pt's wife states she will schedule the follow up appt for pt as recommended on dc.        Care  (CTS) will continue to follow as needed. Will cont to follow for dc plan of care.    Karen Botello RN CTS  Care Transitions  [phone]

## 2017-12-27 NOTE — PROGRESS NOTES
Feels better; no further chest pain.  VS ok, except still on nasal O2.  Hgb up to 8.2, about as expected.  Chemistries and fluid balance look ok.    Dialysis in AM.  Transfuse another RBC unit then; aim for Hgb >9.  Maybe home afterward if off O2  and no further heart concerns.    Pankaj Hamilton MD  Nephrology; [employer]  [phone]

## 2017-12-27 NOTE — PLAN OF CARE
Problem: Cardiac: ACS (Acute Coronary Syndrome) (Adult)  Goal: Signs and Symptoms of Listed Potential Problems Will be Absent, Minimized or Managed (Cardiac: ACS)  Signs and symptoms of listed potential problems will be absent, minimized or managed by discharge/transition of care (reference Cardiac: ACS (Acute Coronary Syndrome) (Adult) CPG).   Outcome: Improving  VSS. High flow oxygen used to keep stats above 90. Pt denies CP.   Heparin gtt at 1500 units/hour. Assist of 1. 0200 bg 171. Enteric precautions. Echo scheduled for tomorrow. Tele---

## 2017-12-27 NOTE — PLAN OF CARE
Problem: Cardiac: ACS (Acute Coronary Syndrome) (Adult)  Goal: Signs and Symptoms of Listed Potential Problems Will be Absent, Minimized or Managed (Cardiac: ACS)  Signs and symptoms of listed potential problems will be absent, minimized or managed by discharge/transition of care (reference Cardiac: ACS (Acute Coronary Syndrome) (Adult) CPG).   Outcome: No Change  Pt has had no further CP this evening.  Heparin gtt at 1500 U/hr, recheck level in am.  Echo tomorrow, poss angiogram.   VSS.  On hi sterling O2 to keep sats above 90%, FRANCO.  Fistula C/D/I, + thrill & bruit.  Denies pain.  POC reviewed with pt, questions answered.

## 2017-12-27 NOTE — PROGRESS NOTES
Respiratory Therapy Note        Pt was taken off HFNC and placed on a 4 Lpm NC at 07:50.  No SOB reported, no increase in WOB.  SpO2 98%.    December 27, 2017 7:53 AM  Chidi Hernandez

## 2017-12-27 NOTE — PROGRESS NOTES
[facility]  Hospitalist Progress Note  Ezra Fortune MD 12/27/17    Reason for Stay (Diagnosis): chest pain, anemia         Assessment and Plan:      Summary of Stay: Donald Raza is a 67-year-old man with a complex past medical history including coronary artery disease who woke up in the night with chest pressure and SOB. He has a history of well controlled HIV infection, end-stage renal disease, on hemodialysis, diabetes mellitus, known CAD, pulmonary hypertension and dyslipidemia. He has had multiple coronary interventions in the past, his last coronary angiogram was about 2 months ago and there was question of restenosis in diagonal although it did not require intervention at the time.  He was admitted on 12/25/17 for possible nstemi due to mild elevation in troponin.  Cardiology was consulted.  A repeat Hg in the morning was 6.7 and he likely actually has demand ischemia from his acute on chronic anemia in setting of known coronary disease.  Symptoms resolved with transfusion of 1 unit RBCs.  Weaning oxygen off.      Problem List/Assessment and Plan:   Acute hypoxic respiratory failure and chest pain - I suspect this is a combination of flash pulmonary edema due to elevated BP and also his anemia. His pain is better now and his oxygen requirements are improving.  Cardiology consulted.  Initially angiogram planned, but this likely more represents demand ischemia and goal to keep Hg > 8.  If pain returns despite Hg being > 8 there may be consideration for angiogram.  -wean O2 to off     Acute on chronic anemia - Discussed with Dr. Hamilton. The Hb of 10 from yesterday is likely erroneous, as patient's baseline is in 7-8 range, and was just checked at dialysis a week ago. His chronic anemia is likely due to HIV, AOCD, ESRD.  He has developed a transfusion dependant anemia. Not very responsive to EPO per nephrology.  -repeat Hg tomorrow likely transfuse while on HD if < 8 or close to this       HTN, CAD - resumed amlodipine 10 mg daily, losartan increased to 50 mg daily, Imdur 90 daily, hydralazine 25 BID. Also on asa/plavix , atorvastatin      End-stage renal disease -  on HD. We will request Nephrology consultation to direct his hemodialysis.   -HD tomorrow morning per his routine schedule     HIV -  Resume baseline home medications      IDDM type II - We will continue his Lantus and premeal insulin and monitor blood sugars. Was given a lowe dose this morning as he was NPO for possible angiogram, will increase Lantus back to baseline dose of 50 units      Possible viral illness -  He does describe some nasal congestion and mild cough. Influenza Ag and PCR negative.     DVT Prophylaxis: Heparin     Code Status: Full Code     Disposition: Expected discharge in 1-2 days .        Interval History (Subjective):      No acute events overnight.  No recurrence of chest pain.  Is not sob or light headed.                  Physical Exam:      Last Vital Signs:  /59 (BP Location: Right arm)  Temp 96.7  F (35.9  C) (Oral)  Resp 20  Wt 85.9 kg (189 lb 6.4 oz)  SpO2 95%  BMI 26.42 kg/m2      Intake/Output Summary (Last 24 hours) at 12/27/17 1612  Last data filed at 12/27/17 1516   Gross per 24 hour   Intake             1387 ml   Output              100 ml   Net             1287 ml       Constitutional: Awake, NAD  Eyes: sclera white   HEENT:  MMM  Respiratory: no respiratory distress, few L basilar crackles  Cardiovascular: RRR.  1/6 systolic murmur  GI: non-tender, not distended, bowel sounds present  Skin: no rash   Musculoskeletal/extremities:   No edema. Fistula with palpable thrill/audible bruit  Neurologic: A&O   Psychiatric: calm, cooperative, normal affect         Medications:      All current medications were reviewed with changes reflected in problem list.         Data:      All new lab and imaging data was reviewed.   Labs:    Recent Labs  Lab 12/27/17  0720 12/26/17  0631 12/25/17  0855   NA  133 137 139   POTASSIUM 4.8 5.5* 5.0   CHLORIDE 97 100 103   CO2 31 27 23   ANIONGAP 5 10 13   * 149* 232*   BUN 38* 52* 60*   CR 5.63* 7.90* 8.72*   GFRESTIMATED 10* 7* 6*   GFRESTBLACK 12* 8* 7*   PRABHA 9.2 9.3 9.0       Recent Labs  Lab 12/27/17  0720 12/26/17  0631 12/25/17  1407 12/25/17  0923   WBC 4.7 4.5  --  4.7   HGB 8.2* 6.7*  --  10.0*   HCT 26.5* 21.5*  --  31.4*   MCV 91 92  --  90   * 105*  109* 108* 88*      Influenza pcr negative    Imaging:   TTE:  Interpretation Summary     The visual ejection fraction is estimated at 55-60%.  The right ventricle is normal in structure, function and size.  There is mild (1+) mitral regurgitation.  There is mild (1+) tricuspid regurgitation.  There is trace to mild pulmonic valvular regurgitation.  Compared to prior study, changes are noted.      Ezra Fortune MD

## 2017-12-28 VITALS
WEIGHT: 189.4 LBS | TEMPERATURE: 97.7 F | HEART RATE: 70 BPM | SYSTOLIC BLOOD PRESSURE: 147 MMHG | OXYGEN SATURATION: 96 % | DIASTOLIC BLOOD PRESSURE: 66 MMHG | BODY MASS INDEX: 26.42 KG/M2 | RESPIRATION RATE: 16 BRPM

## 2017-12-28 LAB
ABO + RH BLD: NORMAL
ABO + RH BLD: NORMAL
ANION GAP SERPL CALCULATED.3IONS-SCNC: 13 MMOL/L (ref 3–14)
BLD GP AB SCN SERPL QL: NORMAL
BLD PROD TYP BPU: NORMAL
BLD PROD TYP BPU: NORMAL
BLD UNIT ID BPU: 0
BLOOD BANK CMNT PATIENT-IMP: NORMAL
BLOOD PRODUCT CODE: NORMAL
BPU ID: NORMAL
BUN SERPL-MCNC: 59 MG/DL (ref 7–30)
CALCIUM SERPL-MCNC: 9.6 MG/DL (ref 8.5–10.1)
CHLORIDE SERPL-SCNC: 95 MMOL/L (ref 94–109)
CO2 SERPL-SCNC: 25 MMOL/L (ref 20–32)
CREAT SERPL-MCNC: 7.7 MG/DL (ref 0.66–1.25)
ERYTHROCYTE [DISTWIDTH] IN BLOOD BY AUTOMATED COUNT: 15.6 % (ref 10–15)
GFR SERPL CREATININE-BSD FRML MDRD: 7 ML/MIN/1.7M2
GLUCOSE BLDC GLUCOMTR-MCNC: 113 MG/DL (ref 70–99)
GLUCOSE BLDC GLUCOMTR-MCNC: 115 MG/DL (ref 70–99)
GLUCOSE BLDC GLUCOMTR-MCNC: 170 MG/DL (ref 70–99)
GLUCOSE SERPL-MCNC: 187 MG/DL (ref 70–99)
HCT VFR BLD AUTO: 25.8 % (ref 40–53)
HGB BLD-MCNC: 8.1 G/DL (ref 13.3–17.7)
MCH RBC QN AUTO: 28.6 PG (ref 26.5–33)
MCHC RBC AUTO-ENTMCNC: 31.4 G/DL (ref 31.5–36.5)
MCV RBC AUTO: 91 FL (ref 78–100)
NUM BPU REQUESTED: 2
PLATELET # BLD AUTO: 106 10E9/L (ref 150–450)
POTASSIUM SERPL-SCNC: 4.5 MMOL/L (ref 3.4–5.3)
RBC # BLD AUTO: 2.83 10E12/L (ref 4.4–5.9)
SODIUM SERPL-SCNC: 133 MMOL/L (ref 133–144)
SPECIMEN EXP DATE BLD: NORMAL
TRANSFUSION STATUS PATIENT QL: NORMAL
TRANSFUSION STATUS PATIENT QL: NORMAL
WBC # BLD AUTO: 3.7 10E9/L (ref 4–11)

## 2017-12-28 PROCEDURE — P9016 RBC LEUKOCYTES REDUCED: HCPCS | Performed by: INTERNAL MEDICINE

## 2017-12-28 PROCEDURE — 25000128 H RX IP 250 OP 636: Performed by: INTERNAL MEDICINE

## 2017-12-28 PROCEDURE — 00000146 ZZHCL STATISTIC GLUCOSE BY METER IP

## 2017-12-28 PROCEDURE — 25000132 ZZH RX MED GY IP 250 OP 250 PS 637: Mod: GY | Performed by: INTERNAL MEDICINE

## 2017-12-28 PROCEDURE — 99239 HOSP IP/OBS DSCHRG MGMT >30: CPT | Performed by: INTERNAL MEDICINE

## 2017-12-28 PROCEDURE — 63400005 ZZH RX 634: Performed by: INTERNAL MEDICINE

## 2017-12-28 PROCEDURE — A9270 NON-COVERED ITEM OR SERVICE: HCPCS | Mod: GY | Performed by: INTERNAL MEDICINE

## 2017-12-28 PROCEDURE — 85027 COMPLETE CBC AUTOMATED: CPT | Performed by: INTERNAL MEDICINE

## 2017-12-28 PROCEDURE — 25000131 ZZH RX MED GY IP 250 OP 636 PS 637: Mod: GY | Performed by: INTERNAL MEDICINE

## 2017-12-28 PROCEDURE — 36415 COLL VENOUS BLD VENIPUNCTURE: CPT | Performed by: INTERNAL MEDICINE

## 2017-12-28 PROCEDURE — 80048 BASIC METABOLIC PNL TOTAL CA: CPT | Performed by: INTERNAL MEDICINE

## 2017-12-28 PROCEDURE — 90937 HEMODIALYSIS REPEATED EVAL: CPT

## 2017-12-28 PROCEDURE — 99232 SBSQ HOSP IP/OBS MODERATE 35: CPT | Performed by: INTERNAL MEDICINE

## 2017-12-28 RX ORDER — LOSARTAN POTASSIUM 50 MG/1
50 TABLET ORAL EVERY EVENING
Qty: 30 TABLET | Refills: 1 | Status: SHIPPED | OUTPATIENT
Start: 2017-12-28 | End: 2018-01-17

## 2017-12-28 RX ORDER — HEPARIN SODIUM 1000 [USP'U]/ML
500 INJECTION, SOLUTION INTRAVENOUS; SUBCUTANEOUS CONTINUOUS
Status: DISCONTINUED | OUTPATIENT
Start: 2017-12-28 | End: 2017-12-28 | Stop reason: HOSPADM

## 2017-12-28 RX ORDER — HEPARIN SODIUM 1000 [USP'U]/ML
500 INJECTION, SOLUTION INTRAVENOUS; SUBCUTANEOUS
Status: DISCONTINUED | OUTPATIENT
Start: 2017-12-28 | End: 2017-12-28 | Stop reason: HOSPADM

## 2017-12-28 RX ORDER — ISOSORBIDE MONONITRATE 120 MG/1
120 TABLET, EXTENDED RELEASE ORAL EVERY EVENING
Qty: 30 TABLET | Refills: 1 | Status: SHIPPED | OUTPATIENT
Start: 2017-12-28 | End: 2018-01-17

## 2017-12-28 RX ADMIN — HEPARIN SODIUM 500 UNITS: 1000 INJECTION, SOLUTION INTRAVENOUS; SUBCUTANEOUS at 09:46

## 2017-12-28 RX ADMIN — INSULIN ASPART 2 UNITS: 100 INJECTION, SOLUTION INTRAVENOUS; SUBCUTANEOUS at 08:50

## 2017-12-28 RX ADMIN — GABAPENTIN 300 MG: 300 CAPSULE ORAL at 14:32

## 2017-12-28 RX ADMIN — HEPARIN SODIUM 500 UNITS/HR: 1000 INJECTION, SOLUTION INTRAVENOUS; SUBCUTANEOUS at 09:47

## 2017-12-28 RX ADMIN — ASPIRIN 325 MG: 325 TABLET, DELAYED RELEASE ORAL at 14:32

## 2017-12-28 RX ADMIN — HYDRALAZINE HYDROCHLORIDE 25 MG: 25 TABLET ORAL at 14:31

## 2017-12-28 RX ADMIN — AMLODIPINE BESYLATE 10 MG: 10 TABLET ORAL at 14:31

## 2017-12-28 RX ADMIN — SODIUM CHLORIDE 1000 ML: 9 INJECTION, SOLUTION INTRAVENOUS at 09:45

## 2017-12-28 RX ADMIN — MYCOPHENOLATE MOFETIL 500 MG: 250 CAPSULE ORAL at 08:50

## 2017-12-28 RX ADMIN — SODIUM CHLORIDE 1000 ML: 9 INJECTION, SOLUTION INTRAVENOUS at 09:46

## 2017-12-28 RX ADMIN — INSULIN GLARGINE 45 UNITS: 100 INJECTION, SOLUTION SUBCUTANEOUS at 08:50

## 2017-12-28 RX ADMIN — ERYTHROPOIETIN 15000 UNITS: 10000 INJECTION, SOLUTION INTRAVENOUS; SUBCUTANEOUS at 00:37

## 2017-12-28 RX ADMIN — CALCIUM CARBONATE (ANTACID) CHEW TAB 500 MG 1500 MG: 500 CHEW TAB at 14:31

## 2017-12-28 ASSESSMENT — ACTIVITIES OF DAILY LIVING (ADL)
ADLS_ACUITY_SCORE: 9

## 2017-12-28 NOTE — PROGRESS NOTES
Potassium   Date Value Ref Range Status   12/28/2017 4.5 3.4 - 5.3 mmol/L Final   ]  Lab Results   Component Value Date    HGB 8.1 12/28/2017     Weight: 85.9 kg (189 lb 6.4 oz)    DIALYSIS PROCEDURE NOTE    Patient dialyzed for 3.5 hrs on a 3 K bath with a  net fluid removal of 2.8L.  A BFR of 400ml/min was obtained via LUAF and all connections secured.   Patient was seen by  during treatment.  Total heparin received during treatment:: 2000units.   Meds given:none. 1uRBCs. Complications:none   Procedure and ESRD teaching done and questions answered.  See flowsheet in EPIC for further details.  Hepatitis status confirmed on previous patient.    RO log completed  Prime given: NS  Saline double clamped and Arterial/Venous parameters set.   Patient transported via wc to the dialysis unit   Aseptic prep done for both on/off.  Transducer connectors checked q15 minutes with vital sign check  :  Report received from: VIDYA Rm RN  Report given to: VIDYA rm RN  Outpatient Dialysis at  [facility]    Hepatitis Antigen neg   Hepatitis Antibody immune 1/18/17

## 2017-12-28 NOTE — PROGRESS NOTES
[facility]    Cardiology Progress Note    Date of Service (when I saw the patient): 12/28/2017     Assessment & Plan   Donald Raza is a 67 year old male with a cardiac h/o CAD with multiple prior NSTEMIs and multivessel stenting who was admitted on 12/25/2017 with CP and SOB. Other comorbidities include ESRD on dialysis, DM and HIV. Symptoms consistent with his prior angina. Initially plan was for repeat cardiac cath, but hgb that AM came back at 6. It was felt that the anemia and his elevated BP in the setting of known small vessel disease likely were the culprit for the angina. Pt was transfused and has not had recurrent CP.    1. CP/CAD. Likely angina in the setting of anemia, hgb 6, and his elevated BP in the setting of known small vessel disease.  No recurrent CP at this time. Would continue with medical management at this time. Can consider a repeat cath if he has recurrent CP. Not on a BB due to significant bradycardia in the past. Continue amlodipine 10mg, hydralazine 25mg BID, increased dose of Imdur now at 120mg nightly and increased dose of losartan 50mg. Continue with atorvastatin and antiplatelet therapy.    2. HTN. BPs have been ok during the day, but elevated at night. if BP continues to be elevated at night consider increasing his PM dose of hydralazine.    Will arrange follow up with a cardiology RENETTA in the next 1-2 weeks. Follow up already planned with Dr Diaz in late Jan.     Kylee Rosales PA-C      Interval History   Feels well this AM. No recurrent CP. Plan for dialysis today.    Physical Exam   Temp: 96.9  F (36.1  C) Temp src: Oral BP: 140/70   Heart Rate: 68 Resp: 20 SpO2: 92 % O2 Device: None (Room air) Oxygen Delivery: 1 LPM  Vitals:    12/26/17 0609 12/27/17 0721   Weight: 88.3 kg (194 lb 9.6 oz) 85.9 kg (189 lb 6.4 oz)     Vital Signs with Ranges  Temp:  [96.7  F (35.9  C)-97.6  F (36.4  C)] 96.9  F (36.1  C)  Heart Rate:  [59-82] 68  Resp:  [20] 20  BP:  (127-172)/(59-79) 140/70  SpO2:  [92 %-99 %] 92 %  I/O last 3 completed shifts:  In: 780 [P.O.:780]  Out: 100 [Urine:100]    Constitutional     alert and oriented, in no acute distress.     Skin     warm and dry to touch    Lungs  clear to auscultation     Cardiac  regular rhythm, S1 normal, S2 normal, No S3 or S4, no murmurs, no rubs    Abdomen     abdomen soft, bowel sounds normoactive, no hepatosplenomegaly    Extremities and Back     no clubbing, cyanosis. No edema observed.        Neurological     no gross motor deficits noted, affect appropriate, oriented to time, person and place.      Medications     heparin (porcine)       heparin (porcine)       NaCl 10 mL/hr at 12/26/17 0957       sodium chloride 0.9%  250 mL Intravenous Once in dialysis     sodium chloride 0.9%  1,000-2,000 mL Hemodialysis Machine Once     gelatin absorbable  1 each Topical During Hemodialysis (from stock)     heparin (porcine)  500 Units Hemodialysis Machine Once in dialysis     insulin glargine  45 Units Subcutaneous QAM     NEPHROCAPS  1 capsule Oral At Bedtime     isosorbide mononitrate  120 mg Oral QPM     sodium chloride 0.9%  1,000-2,000 mL Hemodialysis Machine Once     gelatin absorbable  1 each Topical During Hemodialysis (from stock)     heparin (porcine)  500 Units Hemodialysis Machine Once in dialysis     sodium chloride (PF)  3 mL Intracatheter Q8H     aspirin EC  325 mg Oral Daily     losartan (COZAAR) tablet 50 mg  50 mg Oral QPM     hydrALAZINE (APRESOLINE) tablet 25 mg  25 mg Oral BID     abacavir  600 mg Oral QPM     amLODIPine (NORVASC) tablet 10 mg  10 mg Oral Daily     atorvastatin  40 mg Oral At Bedtime     calcium carbonate  1,500 mg Oral TID     clopidogrel (PLAVIX) tablet 75 mg  75 mg Oral QPM     dapsone  100 mg Oral At Bedtime     darunavir  800 mg Oral At Bedtime     dolutegravir  50 mg Oral At Bedtime     gabapentin  300 mg Oral QAM     gabapentin  600 mg Oral QPM     mycophenolate  500 mg Oral BID      omeprazole  40 mg Oral Daily     ritonavir  100 mg Oral At Bedtime     insulin aspart  1-10 Units Subcutaneous TID AC     insulin aspart  1-7 Units Subcutaneous At Bedtime     insulin aspart  5 Units Subcutaneous TID w/meals     - MEDICATION INSTRUCTIONS for Dialysis Patients -   Does not apply See Admin Instructions     albuterol  2.5 mg Nebulization Once       Data   TTE yesterday (limited):  The visual ejection fraction is estimated at 55-60%.  The right ventricle is normal in structure, function and size.  There is mild (1+) mitral regurgitation.  There is mild (1+) tricuspid regurgitation.  There is trace to mild pulmonic valvular regurgitation.  **Prior inferior and inferolateral WMA resolved.

## 2017-12-28 NOTE — PLAN OF CARE
Problem: Patient Care Overview  Goal: Plan of Care/Patient Progress Review  Outcome: Adequate for Discharge Date Met: 12/28/17  Pt discharged home with wife as transport.  Discharge instructions reviewed with patient and wife, he verbalized understanding and all questions were answered.  IV removed.  Pt discharged with all personal belongings and discharge medications. Pt taken off unit via wheelchair.

## 2017-12-28 NOTE — PROGRESS NOTES
Inpatient Dialysis Progress Note           Assessment and Plan:   ESRD status quo.  Stable dialysis today.  Expect good chemical exchange and improved fluid balance by end of run.  Next HD 12/30 at [facility] unit.  I have called new orders to that unit, including recommendation re: reduction in goal wt.  Nephrology care with Dr. Chavez at time of discharge.  I am in favor of discharge later today.      Unstable angina pectoris (H)    * No resolved hospital problems. *               Interval History:   no new complaints, up and ambulating, alert, oriented to person, place and time and doing well; no cp, sob, n/v/d, or abd pain.  Examined during run.  Stable VS throughout.  No further chest sx's.  Transfused 1 unit RBC's during run without problem.  Good intake.  Makes some urine, likely oliguric range.  Meds and labs reviewed.  Lytes nl; K+ 4.5.  BUN/Cr in expected range.  Glucoses ok, ~150.  Hgb ~same as 12/27 AM, 8.1.  Rest of CBC stable also; plts 106K.                   Dialysis Details:   Current weight: 85.9 kg (actual weight)  Dry Weight: TBD; perhaps 83-84 kg.    Dialysis Temp: 36.5  C  Access Device: AVF  Access Site: left  Dialyzer: Optiflux 160  Dialysis Bath: K+ 3  Sodium Profile: no  UF Goal: 3L  Blood Flow Rate (mL/min): 400  Total Treatment Time (hrs): 3.5  Heparin: Low dose as required    Medications:  EPO dose: 59201 received earlier today  Zemplar: no  IV Fe: no            Medications:       sodium chloride 0.9%  250 mL Intravenous Once in dialysis     gelatin absorbable  1 each Topical During Hemodialysis (from stock)     heparin (porcine)  500 Units Hemodialysis Machine Once in dialysis     insulin glargine  45 Units Subcutaneous QAM     NEPHROCAPS  1 capsule Oral At Bedtime     isosorbide mononitrate  120 mg Oral QPM     gelatin absorbable  1 each Topical During Hemodialysis (from stock)     sodium chloride (PF)  3 mL Intracatheter Q8H     aspirin EC  325 mg Oral Daily     losartan  (COZAAR) tablet 50 mg  50 mg Oral QPM     hydrALAZINE (APRESOLINE) tablet 25 mg  25 mg Oral BID     abacavir  600 mg Oral QPM     amLODIPine (NORVASC) tablet 10 mg  10 mg Oral Daily     atorvastatin  40 mg Oral At Bedtime     calcium carbonate  1,500 mg Oral TID     clopidogrel (PLAVIX) tablet 75 mg  75 mg Oral QPM     dapsone  100 mg Oral At Bedtime     darunavir  800 mg Oral At Bedtime     dolutegravir  50 mg Oral At Bedtime     gabapentin  300 mg Oral QAM     gabapentin  600 mg Oral QPM     mycophenolate  500 mg Oral BID     omeprazole  40 mg Oral Daily     ritonavir  100 mg Oral At Bedtime     insulin aspart  1-10 Units Subcutaneous TID AC     insulin aspart  1-7 Units Subcutaneous At Bedtime     insulin aspart  5 Units Subcutaneous TID w/meals     - MEDICATION INSTRUCTIONS for Dialysis Patients -   Does not apply See Admin Instructions     albuterol  2.5 mg Nebulization Once       heparin (porcine) 500 Units/hr (12/28/17 0947)     heparin (porcine)       NaCl 10 mL/hr at 12/26/17 0957                    Physical Exam:       Vital Sign Ranges  Temp:  [96.7  F (35.9  C)-97.8  F (36.6  C)] 97.7  F (36.5  C)  Pulse:  [70] 70  Heart Rate:  [63-82] 70  Resp:  [16-20] 16  BP: (101-172)/(54-79) 132/65  SpO2:  [92 %-97 %] 96 %    Weight, current: 85.9 kg (actual weight)  Weight change:     I/O last 3 completed shifts:  In: 780 [P.O.:780]  Out: 100 [Urine:100]    Physical Exam:   General:  Patient comfortable, in no apparent distress.  Awake, alert, oriented x3.  Neck:  Supple, no JVD.  Lungs:  Few scattered rhonchi to auscultation bilaterally.  Cardiac:  Regular rate and rhythm, no murmurs, rub, or gallops.  Abdomen:  Soft, nontender, physiologic sounds.  Extremities:  Without edema.  2+ pulses.  Skin:  Warm, dry.  Neurologic:  No focal deficits.             Data:        Lab Results   Component Value Date     12/28/2017    Lab Results   Component Value Date    CHLORIDE 95 12/28/2017    Lab Results   Component  Value Date    BUN 59 (H) 12/28/2017      Lab Results   Component Value Date    POTASSIUM 4.5 12/28/2017    Lab Results   Component Value Date    CO2 25 12/28/2017    Lab Results   Component Value Date    CR 7.70 (H) 12/28/2017        Lab Results   Component Value Date     12/28/2017     12/27/2017     12/26/2017     Lab Results   Component Value Date    POTASSIUM 4.5 12/28/2017    POTASSIUM 4.8 12/27/2017    POTASSIUM 5.5 (H) 12/26/2017     Lab Results   Component Value Date    CHLORIDE 95 12/28/2017    CHLORIDE 97 12/27/2017    CHLORIDE 100 12/26/2017     Lab Results   Component Value Date    CO2 25 12/28/2017    CO2 31 12/27/2017    CO2 27 12/26/2017     Lab Results   Component Value Date    CR 7.70 (H) 12/28/2017    CR 5.63 (H) 12/27/2017    CR 7.90 (H) 12/26/2017     Lab Results   Component Value Date    BUN 59 (H) 12/28/2017    BUN 38 (H) 12/27/2017    BUN 52 (H) 12/26/2017     Lab Results   Component Value Date    HGB 8.1 (L) 12/28/2017    HGB 8.2 (L) 12/27/2017    HGB 6.7 (LL) 12/26/2017     Lab Results   Component Value Date    PH 7.38 01/07/2017    PO2 262 (H) 01/07/2017    PCO2 42 01/07/2017    HCO3 25 01/07/2017    SARANYA 2.5 01/06/2017           Pankaj Hamilton MD  Nephrology; [employer]  [phone]

## 2017-12-28 NOTE — PLAN OF CARE
Problem: Patient Care Overview  Goal: Plan of Care/Patient Progress Review  VS /75 (BP Location: Right arm)  Temp 97.2  F (36.2  C) (Oral)  Resp 20  Wt 85.9 kg (189 lb 6.4 oz)  SpO2 95%  BMI 26.42 kg/m2  Lung sounds Clear/diminished  O2 Room air  Tele SR  Tolerating diabetic/low fat/Na diet  IVF Saline locked  Activity up with 1 assist  Pain Denies  Patient/family centered care Plan of care discussed with patient.  Discharge plan d/c 1-2 days    Pt scheduedled for hemodialysis today, plan to transfuse 1 unit PRBC during appointment. ECHO completed yesterday 12/27 with an EF of 55-60%.

## 2017-12-29 ENCOUNTER — CARE COORDINATION (OUTPATIENT)
Dept: CARE COORDINATION | Facility: CLINIC | Age: 67
End: 2017-12-29

## 2017-12-29 ENCOUNTER — CARE COORDINATION (OUTPATIENT)
Dept: CARDIOLOGY | Facility: CLINIC | Age: 67
End: 2017-12-29

## 2017-12-29 NOTE — PROGRESS NOTES
Patient was evaluated by cardiology while inpatient for CP believed secondary to small vessel disease and anemia. Called patient to discuss any post hospital d/c questions he may have, review medication changes, and confirm f/u appts.Patient denied any questions regarding new medications or changes to some of his current medications that he was taking prior to admission. Patient denied any SOB, chest pain, or light headedness. RN confirmed with patient that he has an apt scheduled on 1/16/18 with RENETTA Yuki Hinojosa. Patient advised to call clinic with any cardiac related questions or concerns prior to his apt on 1/16/18. Patient verbalized understanding and agreed with plan.

## 2017-12-30 NOTE — PROGRESS NOTES
Patient admitted 12/25/17 to [facility] for Acute hypoxemic respiratory failure.   Was d/c'd to home with support of family.   Seen at [facility].   Followed by RN CCC through [facility].    Patient has f/u scheduled with cardiology and his home clinic.

## 2018-01-05 NOTE — ED NOTES
2 unsuccessful IV attempts. Charge RN notified.    history. Review of Systems:   Review of Systems   Constitutional: Negative. HENT: Negative. Eyes: Negative. Respiratory: Negative. Cardiovascular: Negative. Gastrointestinal: Negative. Genitourinary: Negative. Irregular menses   Musculoskeletal: Negative. Skin: Negative. Neurological: Negative. Endo/Heme/Allergies: Negative. Psychiatric/Behavioral: Negative. Physical exam:  vitals: /85 (Site: Right Arm, Position: Sitting, Cuff Size: Medium Adult)   Pulse 83   Resp 12   Ht 5' 5\" (1.651 m)   Wt 163 lb (73.9 kg)   LMP 12/16/2017   SpO2 100%   BMI 27.12 kg/m²   Gen: alert, no apparent distress  HEENT: No pathologic skin lesions noted,NC/AT, normal midline nontender thyroid   Lung Exam: Clear to auscultation in all fields bilaterally, without wheezes,rales or rhonchi. Cardiac Exam: Normal sinus rhythm and rate, without murmurs, rubs or gallops appreciated. Breast Exam: Symmetric without pathological skin changes, nontender without discrete suspicious masses palpated, supraclavicular or axillary adenopathy or nipple discharge noted. Abdominal Exam: Nontender to deep palpation without organomegaly, masses or CVAT appreciated, BS positive. No spinal deformation or tenderness. External Genitalia: Normal development without vulvar, vaginal or cervical lesions noted. Normal vaginal discharge, uterus anterior, 4-6 weeks without CMT. Adnexa nontender without abnormal masses bilaterally. Rectal Exam: Omitted. Extremities: Nontender without clubbing, cyanosis or edema. F.R.O.M. Neurologic Exam: Grossly intact without noted sensorimotor deficits and oriented x 3. Encounter Diagnoses   Name Primary?     Encounter for routine gynecological examination with Papanicolaou smear of cervix Yes    Screening mammogram, encounter for     Encounter for Papanicolaou smear of cervix     BMI 27.0-27.9,adult     Non-smoker     Irregular menses        Assessment/Plan:

## 2018-01-17 ENCOUNTER — OFFICE VISIT (OUTPATIENT)
Dept: CARDIOLOGY | Facility: CLINIC | Age: 68
End: 2018-01-17
Attending: PHYSICIAN ASSISTANT
Payer: MEDICARE

## 2018-01-17 VITALS
DIASTOLIC BLOOD PRESSURE: 66 MMHG | HEIGHT: 71 IN | HEART RATE: 66 BPM | BODY MASS INDEX: 27.44 KG/M2 | SYSTOLIC BLOOD PRESSURE: 136 MMHG | WEIGHT: 196 LBS

## 2018-01-17 DIAGNOSIS — I20.0 UNSTABLE ANGINA PECTORIS (H): ICD-10-CM

## 2018-01-17 DIAGNOSIS — I25.10 CORONARY ARTERY DISEASE INVOLVING NATIVE HEART, ANGINA PRESENCE UNSPECIFIED, UNSPECIFIED VESSEL OR LESION TYPE: ICD-10-CM

## 2018-01-17 DIAGNOSIS — I15.1 HYPERTENSION SECONDARY TO OTHER RENAL DISORDERS: ICD-10-CM

## 2018-01-17 PROCEDURE — 99213 OFFICE O/P EST LOW 20 MIN: CPT | Performed by: NURSE PRACTITIONER

## 2018-01-17 RX ORDER — ISOSORBIDE MONONITRATE 120 MG/1
120 TABLET, EXTENDED RELEASE ORAL EVERY EVENING
Qty: 30 TABLET | Refills: 11 | Status: ON HOLD | OUTPATIENT
Start: 2018-01-17 | End: 2020-01-01

## 2018-01-17 RX ORDER — LOSARTAN POTASSIUM 50 MG/1
50 TABLET ORAL EVERY EVENING
Qty: 30 TABLET | Refills: 3 | Status: ON HOLD | OUTPATIENT
Start: 2018-01-17 | End: 2018-03-16

## 2018-01-17 NOTE — PROGRESS NOTES
HPI and Plan:   See dictation #[id number]    No orders of the defined types were placed in this encounter.      Orders Placed This Encounter   Medications     losartan (COZAAR) 50 MG tablet     Sig: Take 1 tablet (50 mg) by mouth every evening     Dispense:  30 tablet     Refill:  3     Isosorbide Mononitrate  MG TB24     Sig: Take 1 tablet (120 mg) by mouth every evening     Dispense:  30 tablet     Refill:  11       Medications Discontinued During This Encounter   Medication Reason     losartan (COZAAR) 50 MG tablet Reorder     isosorbide mononitrate 120 MG TB24 Reorder         Encounter Diagnoses   Name Primary?     Unstable angina pectoris (H)      Hypertension secondary to other renal disorders      Coronary artery disease involving native heart, angina presence unspecified, unspecified vessel or lesion type        CURRENT MEDICATIONS:  Current Outpatient Prescriptions   Medication Sig Dispense Refill     losartan (COZAAR) 50 MG tablet Take 1 tablet (50 mg) by mouth every evening 30 tablet 3     Isosorbide Mononitrate  MG TB24 Take 1 tablet (120 mg) by mouth every evening 30 tablet 11     insulin glargine (LANTUS) 100 UNIT/ML injection Inject 50 Units Subcutaneous daily Takes about noon       HYDRALAZINE HCL PO Take 25 mg by mouth 2 times daily       mycophenolate (GENERIC EQUIVALENT) 500 MG tablet Take 1 tablet (500 mg) by mouth 2 times daily On an empty stomach       AMLODIPINE BESYLATE PO Take 10 mg by mouth daily       Insulin Lispro (HUMALOG PEN SC) Take 15 units TID and an additional 2 units if BG is over 150       calcium carbonate (TUMS) 500 MG chewable tablet Take 3 chew tab by mouth 3 times daily       B COMPLEX-C-FOLIC ACID PO Take 1 tablet by mouth At Bedtime        albuterol (PROAIR HFA/PROVENTIL HFA/VENTOLIN HFA) 108 (90 BASE) MCG/ACT Inhaler Inhale 2 puffs into the lungs every 6 hours as needed for shortness of breath / dyspnea or wheezing 1 Inhaler 1     Nutritional Supplements (NEPRO  "PO) 1-3 times daily       omeprazole (PRILOSEC) 40 MG capsule Take 40 mg by mouth daily 1 hour before dinner       order for DME Equipment being ordered: Other: 4WW  Treatment Diagnosis: Decreased activity tolerance 1 each 0     gabapentin (NEURONTIN) 300 MG capsule Take 600 mg by mouth every evening       imiquimod (ALDARA) 5 % cream Apply topically as needed       DOXERCALCIFEROL IV Inject 6 mcg into the vein three times a week (with dialysis)       CLONAZEPAM PO Take 0.5 mg by mouth nightly as needed for anxiety (restless legs)       CLOPIDOGREL BISULFATE PO Take 75 mg by mouth every evening        abacavir (ZIAGEN) 300 MG tablet Take 600 mg by mouth every evening        chlorhexidine (CHLORHEXIDINE) 0.12 % solution Rinse and gargle 15 ml by mouth twice a day as directed.       terbinafine (LAMISIL AT) 1 % cream Apply topically 2 times daily as needed        atorvastatin (LIPITOR) 40 MG tablet Take 40 mg by mouth At Bedtime       epoetin brittni (EPOGEN,PROCRIT) 78287 UNIT/ML injection Inject 11,000 Units Subcutaneous three times a week WITH DIALYSIS       iron sucrose (VENOFER) 20 MG/ML injection Inject 50 mg into the vein once a week WIth dialysis       MAGNESIUM OXIDE PO Take 400 mg by mouth At Bedtime       dolutegravir (TIVICAY) 50 MG tablet Take 50 mg by mouth At Bedtime       nitroglycerin (NITROSTAT) 0.4 MG SL tablet One tablet under the tongue every 5 minutes if needed for chest pain. May repeat every 5 minutes for a maximum of 3 doses in 15 minutes\" 25 tablet 3     gabapentin (NEURONTIN) 300 MG capsule Take 300 mg by mouth every morning        dapsone 100 MG tablet Take 100 mg by mouth At Bedtime        Darunavir Ethanolate (PREZISTA PO) Take 800 mg by mouth At Bedtime.       ritonavir (NORVIR) 100 MG capsule Take 1 capsule by mouth At Bedtime        [DISCONTINUED] isosorbide mononitrate 120 MG TB24 Take 1 tablet (120 mg) by mouth every evening 30 tablet 1     [DISCONTINUED] losartan (COZAAR) 50 MG tablet " Take 1 tablet (50 mg) by mouth every evening 30 tablet 1       ALLERGIES     Allergies   Allergen Reactions     Calcium Acetate Other (See Comments)     Other reaction(s): Other, see comments  Pain in chest and back  Pain in chest area (sensitive skin)      Diagnostic X-Ray Materials Other (See Comments)     PN: renal failure     Lisinopril      Sulfa Drugs        PAST MEDICAL HISTORY:  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, recurrent     Bradycardia 5/28/2016     CAD S/P percutaneous coronary angioplasty 6/15/2015     Chest pain      CKD (chronic kidney disease)     Hemodialysis     Dilated aortic root (H) 5/6/2016     ESRD (end stage renal disease) on dialysis (H) 5/6/2016     Hemodialysis-associated hypotension 5/22/2016     Human immunodeficiency virus (HIV) disease      Hypertension 2010     Hypotension, unspecified hypotension type 5/22/2016     Impotence of organic origin      Increased prostate specific antigen (PSA) velocity 08/08/2016    Awaiting bx on blood thinner     Mixed hyperlipidemia      Near syncope 2016    with hemodialysis     NSTEMI (non-ST elevated myocardial infarction) (H) 12/2015, 5/2016     Pulmonary HTN     Mod     TIA (transient ischemic attack) 5/2016     Type 2 diabetes mellitus (H) age 50     Unstable angina (H) 3/4/2016       PAST SURGICAL HISTORY:  Past Surgical History:   Procedure Laterality Date     ANGIOGRAM  03-04-16    No culprit lesions, stents widely patent      ANGIOGRAM  05-06-16    Cutting balloon ptca=Diag     APPENDECTOMY  2000     CHOLECYSTECTOMY, LAPOROSCOPIC       COLOSTOMY  09/30/1999    Temporary for diverticulitis     HEART CATH, ANGIOPLASTY  08-18-16    LAD PCI. Stented with a 3.0 x 8 mm Xience Alpine stent.     STENT, CORONARY, S660 15/18  12/2015    VANITA=Diag, PTCA=LAD     STENT, CORONARY, S660  "15/18  06/2015    VANITA=LAD       FAMILY HISTORY:  Family History   Problem Relation Age of Onset     HEART DISEASE Brother 40     CABG     KIDNEY DISEASE Sister      Hypertension Sister      HEART DISEASE Brother      Dilated aorta       SOCIAL HISTORY:  Social History     Social History     Marital status:      Spouse name: N/A     Number of children: N/A     Years of education: N/A     Social History Main Topics     Smoking status: Former Smoker     Types: Cigarettes     Quit date: 2003     Smokeless tobacco: Never Used     Alcohol use No     Drug use: No     Sexual activity: Not Asked     Other Topics Concern     Parent/Sibling W/ Cabg, Mi Or Angioplasty Before 65f 55m? Yes     Caffeine Concern Yes     2 cups daily     Sleep Concern Yes     Special Diet No      more proteins     Exercise Yes     Cardiac rehab      Social History Narrative       Review of Systems:  Skin:  Positive for itching skin on feet hurt sometimes    Eyes:  Positive for glasses    ENT:  Negative      Respiratory:  Negative       Cardiovascular:    Positive for;lightheadedness;fatigue occas. lightheadedness - when  waking up is lightheaded/dizzy specially after dialysis,   Gastroenterology: Positive for reflux;heartburn occas heartburn   Genitourinary:  Positive for prostate problem;dysuria;urgency sometimes urination  hurts-  prostate issues  Musculoskeletal:  Positive for back pain;foot pain occas lower back pain  Neurologic:  Positive for numbness or tingling of feet;headaches toes ,  occas headaches - after dialysis   Psychiatric:  Negative      Heme/Lymph/Imm:  Positive for allergies RX,   Endocrine:  Positive for diabetes      Physical Exam:  Vitals: /66 (BP Location: Right arm, Patient Position: Sitting, Cuff Size: Adult Regular)  Pulse 66  Ht 1.803 m (5' 11\")  Wt 88.9 kg (196 lb)  BMI 27.34 kg/m2    Constitutional:  cooperative        Skin:  warm and dry to the touch          Head:  normocephalic        Eyes:  pupils " equal and round;sclera white        Lymph:      ENT:  no pallor or cyanosis        Neck:  JVP normal bilateral carotid bruit      Respiratory:  clear to auscultation;normal symmetry;normal respiratory excursion         Cardiac: regular rhythm;normal S1 and S2     no presence of murmur          pulses full and equal                                        GI:  abdomen soft        Extremities and Muscular Skeletal:  no edema              Neurological:  no gross motor deficits        Psych:  Alert and Oriented x 3          CC  Kylee Rosales PA-C  [address] [place] [address]

## 2018-01-17 NOTE — MR AVS SNAPSHOT
"              After Visit Summary   1/17/2018    Donald Raza    MRN: [id number]           Patient Information     Date Of Birth          1950        Visit Information        Provider Department      1/17/2018 2:30 PM Yuki Hinojosa APRN CNP [facility]        Today's Diagnoses     Unstable angina pectoris (H)        Hypertension secondary to other renal disorders        Coronary artery disease involving native heart, angina presence unspecified, unspecified vessel or lesion type           Follow-ups after your visit        Your next 10 appointments already scheduled     Jan 29, 2018  9:45 AM CST   Return Visit with Arnoldo Diaz MD   [facility] ([facility])    [address] [place], [place] [id number]  [facility] [address] [phone]              Who to contact     If you have questions or need follow up information about today's clinic visit or your schedule please contact [facility] directly at [phone].  Normal or non-critical lab and imaging results will be communicated to you by SwapBeatshart, letter or phone within 4 business days after the clinic has received the results. If you do not hear from us within 7 days, please contact the clinic through SwapBeatshart or phone. If you have a critical or abnormal lab result, we will notify you by phone as soon as possible.  Submit refill requests through ChoiceMap or call your pharmacy and they will forward the refill request to us. Please allow 3 business days for your refill to be completed.          Additional Information About Your Visit        SwapBeatshart Information     ChoiceMap lets you send messages to your doctor, view your test results, renew your prescriptions, schedule appointments and more. To sign up, go to [email or web address] . Click on \"Log in\" on the left side of the screen, which will take you to the Welcome page. Then click " "on \"Sign up Now\" on the right side of the page.     You will be asked to enter the access code listed below, as well as some personal information. Please follow the directions to create your username and password.     Your access code is: [email or web address]  Expires: 2018  1:13 PM     Your access code will  in 90 days. If you need help or a new code, please call your [place] clinic or [phone].        Care EveryWhere ID     This is your Care EveryWhere ID. This could be used by other organizations to access your [place] medical records  [id number]        Your Vitals Were     Pulse Height BMI (Body Mass Index)             66 1.803 m (5' 11\") 27.34 kg/m2          Blood Pressure from Last 3 Encounters:   18 136/66   17 147/66   17 162/74    Weight from Last 3 Encounters:   18 88.9 kg (196 lb)   17 85.9 kg (189 lb 6.4 oz)   17 89.3 kg (196 lb 14.4 oz)              We Performed the Following     Follow-Up with Cardiac Advanced Practice Provider          Where to get your medicines      These medications were sent to [facility] [address] [place] [address] AT [address] [place] [address], [address]  [address] [place] [address]     Phone:  [phone]     Isosorbide Mononitrate  MG Tb24    losartan 50 MG tablet          Primary Care Provider Office Phone # Fax #    Aida Thomas [phone]       [facility] [address] [place] [address]        Equal Access to Services     JOON KERN : Hadii liza Dolan, waaxda luqadaha, qaybta kaalmada wilton, fatoumata olivo. So [facility] [phone].    ATENCIÓN: Si habla español, tiene a ayala disposición servicios gratuitos de asistencia lingüística. Llame al [phone].    We comply with applicable federal civil rights laws and [place] laws. We do not discriminate on the basis of race, color, national " origin, age, disability, sex, sexual orientation, or gender identity.            Thank you!     Thank you for choosing [facility]  for your care. Our goal is always to provide you with excellent care. Hearing back from our patients is one way we can continue to improve our services. Please take a few minutes to complete the written survey that you may receive in the mail after your visit with us. Thank you!             Your Updated Medication List - Protect others around you: Learn how to safely use, store and throw away your medicines at www.disposemymeds.org.          This list is accurate as of: 1/17/18  3:24 PM.  Always use your most recent med list.                   Brand Name Dispense Instructions for use Diagnosis    abacavir 300 MG tablet    ZIAGEN     Take 600 mg by mouth every evening        albuterol 108 (90 BASE) MCG/ACT Inhaler    PROAIR HFA/PROVENTIL HFA/VENTOLIN HFA    1 Inhaler    Inhale 2 puffs into the lungs every 6 hours as needed for shortness of breath / dyspnea or wheezing    Cough       AMLODIPINE BESYLATE PO      Take 10 mg by mouth daily        atorvastatin 40 MG tablet    LIPITOR     Take 40 mg by mouth At Bedtime        B COMPLEX-C-FOLIC ACID PO      Take 1 tablet by mouth At Bedtime        calcium carbonate 500 MG chewable tablet    TUMS     Take 3 chew tab by mouth 3 times daily        chlorhexidine 0.12 % solution    PERIDEX     Rinse and gargle 15 ml by mouth twice a day as directed.        CLONAZEPAM PO      Take 0.5 mg by mouth nightly as needed for anxiety (restless legs)        CLOPIDOGREL BISULFATE PO      Take 75 mg by mouth every evening        dapsone 100 MG tablet      Take 100 mg by mouth At Bedtime        dolutegravir 50 MG tablet    TIVICAY     Take 50 mg by mouth At Bedtime        DOXERCALCIFEROL IV      Inject 6 mcg into the vein three times a week (with dialysis)        epoetin brittni 62001 UNIT/ML injection    EPOGEN/PROCRIT     " Inject 11,000 Units Subcutaneous three times a week WITH DIALYSIS        * gabapentin 300 MG capsule    NEURONTIN     Take 300 mg by mouth every morning        * gabapentin 300 MG capsule    NEURONTIN     Take 600 mg by mouth every evening        HUMALOG PEN SC      Take 15 units TID and an additional 2 units if BG is over 150        HYDRALAZINE HCL PO      Take 25 mg by mouth 2 times daily        imiquimod 5 % cream    ALDARA     Apply topically as needed        insulin glargine 100 UNIT/ML injection    LANTUS     Inject 50 Units Subcutaneous daily Takes about noon        iron sucrose 20 MG/ML injection    VENOFER     Inject 50 mg into the vein once a week WIth dialysis        Isosorbide Mononitrate  MG Tb24     30 tablet    Take 1 tablet (120 mg) by mouth every evening    Coronary artery disease involving native heart, angina presence unspecified, unspecified vessel or lesion type, Hypertension secondary to other renal disorders       lamISIL AT 1 % cream   Generic drug:  terbinafine      Apply topically 2 times daily as needed        losartan 50 MG tablet    COZAAR    30 tablet    Take 1 tablet (50 mg) by mouth every evening    Hypertension secondary to other renal disorders       MAGNESIUM OXIDE PO      Take 400 mg by mouth At Bedtime        mycophenolate 500 MG tablet    GENERIC EQUIVALENT     Take 1 tablet (500 mg) by mouth 2 times daily On an empty stomach    ESRD (end stage renal disease) on dialysis (H)       NEPRO PO      1-3 times daily        nitroGLYcerin 0.4 MG sublingual tablet    NITROSTAT    25 tablet    One tablet under the tongue every 5 minutes if needed for chest pain. May repeat every 5 minutes for a maximum of 3 doses in 15 minutes\"    Coronary artery disease due to lipid rich plaque, Status post coronary angioplasty       order for DME     1 each    Equipment being ordered: Other: 4WW Treatment Diagnosis: Decreased activity tolerance    SOB (shortness of breath), Generalized muscle " weakness       PREZISTA PO      Take 800 mg by mouth At Bedtime.        priLOSEC 40 MG capsule   Generic drug:  omeprazole      Take 40 mg by mouth daily 1 hour before dinner        ritonavir 100 MG capsule    NORVIR     Take 1 capsule by mouth At Bedtime        * Notice:  This list has 2 medication(s) that are the same as other medications prescribed for you. Read the directions carefully, and ask your doctor or other care provider to review them with you.

## 2018-01-17 NOTE — LETTER
1/17/2018      Cristina Ann Baker, MD [facility] [address] [facility] [address]      RE: Donald Raza       Dear Colleague,    I had the pleasure of seeing Donald Raza in the [facility].    HISTORY OF PRESENT ILLNESS:  This is a 67-year-old male who presents to [facility] today for a followup visit.  He is a patient of Dr. Diaz seen in our clinic for coronary artery disease, end-stage renal disease, hypertension, hyperlipidemia, diabetes, HIV positive.      Donald has an extensive cardiovascular history.  He initially suffered a non-ST elevated myocardial infarction and underwent stenting to his mid-LAD in 2015.  At that time he was also found to have diagonal branch disease, conservatively managed.  Later that year, he again suffered a non-ST elevated myocardial infarction and underwent stenting to his ostial diagonal vessel with kissing balloon angioplasty to his LAD.  Again, 6 months later he suffered a myocardial infarction and underwent cutting balloon angioplasty of the diagonal due to in-stent restenosis.  Three months after that, in 08/2016 he suffered a myocardial infarction and underwent stenting of a drug-eluting stent to his diagonal vessel due to in-stent restenosis.  Since then, he has had episodes of chest pain and has undergone coronary angiograms on 2 different occasions, (last in 10/2017) showing patency of his stents with mild in-stent restenosis of his diagonal vessel.  He has been medically managed since then.      Unfortunately, 2 weeks ago he had a sudden onset of chest pain/back pain similar to his previous angina symptoms.  His troponin cayetano to 0.026 and remained flat.  Evaluation did show reproducible pain with palpitation and hemoglobin of 6.  After receiving blood transfusion, his chest pain resolved.  Echocardiogram demonstrated preserved LV function without any regional wall motion abnormalities,  normal right ventricular function and mild mitral regurgitation and tricuspid regurgitation.      Donald has had issues with hypertension and is on multiple agents to control.  At his last hospitalization, his losartan and Imdur were increased.  He returns today for reassessment.      Donald tells me for the last couple of weeks he has done well.  He has not had any significant chest pain with activity or at rest.  He is weak, especially on dialysis days.  He has some mild shortness of breath when going up the stairs, but this is relatively unchanged.  He denies any resting chest pain or shortness of breath.  Donald denies palpitations, lightheadedness, dizziness, orthopnea or peripheral edema.  He tells me his blood pressure is quite low after dialysis.      PHYSICAL EXAMINATION:  His blood pressure today is 136/66, heart rate is 66 beats per minute and is regular.  His lungs are clear.  There is no peripheral edema.  Weight is stable at 196 pounds.      IMPRESSION AND PLAN:   1.  Coronary artery disease.  History of mid-LAD stenting in 2015 and multiple stenting and percutaneous interventions to his ostial diagonal vessel, last being in 08/2016.  Since then, he has had atypical-type chest pains, with coronary angiography in October showing patency of his stents.  His last episode of chest pain was in the setting of a hemoglobin of 6.  His chest pain resolved with blood transfusion.  Today, he is doing well and tells me that they are planning transfusions if his hemoglobin drops below 7.  We will continue current medical regimen including beta blockade, ARB therapy, Imdur, aspirin and statin.   2.  Hypertension.  Blood pressure controlled on multiple agents.   3.  Mixed hyperlipidemia.   4.  Insulin-dependent diabetes.   5.  End-stage renal disease on hemodialysis.    6.  HIV positive     Thank you for allowing me to participate in this patient's care.  He has followup as planned.       Outpatient Encounter Prescriptions  as of 1/17/2018   Medication Sig Dispense Refill     losartan (COZAAR) 50 MG tablet Take 1 tablet (50 mg) by mouth every evening 30 tablet 3     Isosorbide Mononitrate  MG TB24 Take 1 tablet (120 mg) by mouth every evening 30 tablet 11     insulin glargine (LANTUS) 100 UNIT/ML injection Inject 50 Units Subcutaneous daily Takes about noon       HYDRALAZINE HCL PO Take 25 mg by mouth 2 times daily       mycophenolate (GENERIC EQUIVALENT) 500 MG tablet Take 1 tablet (500 mg) by mouth 2 times daily On an empty stomach       AMLODIPINE BESYLATE PO Take 5 mg by mouth daily        Insulin Lispro (HUMALOG PEN SC) Take 15 units TID and an additional 2 units if BG is over 150       calcium carbonate (TUMS) 500 MG chewable tablet Take 3 chew tab by mouth 4 times daily        B COMPLEX-C-FOLIC ACID PO Take 1 tablet by mouth At Bedtime        albuterol (PROAIR HFA/PROVENTIL HFA/VENTOLIN HFA) 108 (90 BASE) MCG/ACT Inhaler Inhale 2 puffs into the lungs every 6 hours as needed for shortness of breath / dyspnea or wheezing 1 Inhaler 1     Nutritional Supplements (NEPRO PO) 1-3 times daily       omeprazole (PRILOSEC) 40 MG capsule Take 40 mg by mouth daily 1 hour before dinner       order for DME Equipment being ordered: Other: 4WW  Treatment Diagnosis: Decreased activity tolerance 1 each 0     gabapentin (NEURONTIN) 300 MG capsule Take 600 mg by mouth every evening       imiquimod (ALDARA) 5 % cream Apply topically as needed       DOXERCALCIFEROL IV Inject 6 mcg into the vein three times a week (with dialysis)       CLONAZEPAM PO Take 0.5 mg by mouth nightly as needed for anxiety (restless legs)       CLOPIDOGREL BISULFATE PO Take 75 mg by mouth every evening        abacavir (ZIAGEN) 300 MG tablet Take 600 mg by mouth every evening        chlorhexidine (CHLORHEXIDINE) 0.12 % solution Rinse and gargle 15 ml by mouth twice a day as directed.       terbinafine (LAMISIL AT) 1 % cream Apply topically 2 times daily as needed         "atorvastatin (LIPITOR) 40 MG tablet Take 40 mg by mouth At Bedtime       epoetin brittni (EPOGEN,PROCRIT) 51927 UNIT/ML injection Inject 11,000 Units Subcutaneous three times a week WITH DIALYSIS       iron sucrose (VENOFER) 20 MG/ML injection Inject 50 mg into the vein once a week WIth dialysis       MAGNESIUM OXIDE PO Take 400 mg by mouth At Bedtime       dolutegravir (TIVICAY) 50 MG tablet Take 50 mg by mouth At Bedtime       nitroglycerin (NITROSTAT) 0.4 MG SL tablet One tablet under the tongue every 5 minutes if needed for chest pain. May repeat every 5 minutes for a maximum of 3 doses in 15 minutes\" 25 tablet 3     gabapentin (NEURONTIN) 300 MG capsule Take 300 mg by mouth every morning        dapsone 100 MG tablet Take 100 mg by mouth At Bedtime        Darunavir Ethanolate (PREZISTA PO) Take 800 mg by mouth At Bedtime.       ritonavir (NORVIR) 100 MG capsule Take 1 capsule by mouth At Bedtime        [DISCONTINUED] isosorbide mononitrate 120 MG TB24 Take 1 tablet (120 mg) by mouth every evening 30 tablet 1     [DISCONTINUED] losartan (COZAAR) 50 MG tablet Take 1 tablet (50 mg) by mouth every evening 30 tablet 1     No facility-administered encounter medications on file as of 1/17/2018.        Again, thank you for allowing me to participate in the care of your patient.      Sincerely,    DEDRICK Fuller CNP     [facility] [place] [facility]    "

## 2018-01-18 NOTE — PROGRESS NOTES
HISTORY OF PRESENT ILLNESS:  This is a 67-year-old male who presents to [facility] today for a followup visit.  He is a patient of Dr. Diaz seen in our clinic for coronary artery disease, end-stage renal disease, hypertension, hyperlipidemia, diabetes, HIV positive.      Donald has an extensive cardiovascular history.  He initially suffered a non-ST elevated myocardial infarction and underwent stenting to his mid-LAD in 2015.  At that time he was also found to have diagonal branch disease, conservatively managed.  Later that year, he again suffered a non-ST elevated myocardial infarction and underwent stenting to his ostial diagonal vessel with kissing balloon angioplasty to his LAD.  Again, 6 months later he suffered a myocardial infarction and underwent cutting balloon angioplasty of the diagonal due to in-stent restenosis.  Three months after that, in 08/2016 he suffered a myocardial infarction and underwent stenting of a drug-eluting stent to his diagonal vessel due to in-stent restenosis.  Since then, he has had episodes of chest pain and has undergone coronary angiograms on 2 different occasions, (last in 10/2017) showing patency of his stents with mild in-stent restenosis of his diagonal vessel.  He has been medically managed since then.      Unfortunately, 2 weeks ago he had a sudden onset of chest pain/back pain similar to his previous angina symptoms.  His troponin cayetano to 0.026 and remained flat.  Evaluation did show reproducible pain with palpitation and hemoglobin of 6.  After receiving blood transfusion, his chest pain resolved.  Echocardiogram demonstrated preserved LV function without any regional wall motion abnormalities, normal right ventricular function and mild mitral regurgitation and tricuspid regurgitation.      Donald has had issues with hypertension and is on multiple agents to control.  At his last hospitalization, his losartan and Imdur were increased.  He  returns today for reassessment.      Gregorio tells me for the last couple of weeks he has done well.  He has not had any significant chest pain with activity or at rest.  He is weak, especially on dialysis days.  He has some mild shortness of breath when going up the stairs, but this is relatively unchanged.  He denies any resting chest pain or shortness of breath.  Gregorio denies palpitations, lightheadedness, dizziness, orthopnea or peripheral edema.  He tells me his blood pressure is quite low after dialysis.      PHYSICAL EXAMINATION:  His blood pressure today is 136/66, heart rate is 66 beats per minute and is regular.  His lungs are clear.  There is no peripheral edema.  Weight is stable at 196 pounds.      IMPRESSION AND PLAN:   1.  Coronary artery disease.  History of mid-LAD stenting in  and multiple stenting and percutaneous interventions to his ostial diagonal vessel, last being in 2016.  Since then, he has had atypical-type chest pains, with coronary angiography in October showing patency of his stents.  His last episode of chest pain was in the setting of a hemoglobin of 6.  His chest pain resolved with blood transfusion.  Today, he is doing well and tells me that they are planning transfusions if his hemoglobin drops below 7.  We will continue current medical regimen including beta blockade, ARB therapy, Imdur, aspirin and statin.   2.  Hypertension.  Blood pressure controlled on multiple agents.   3.  Mixed hyperlipidemia.   4.  Insulin-dependent diabetes.   5.  End-stage renal disease on hemodialysis.    6.  HIV positive     Thank you for allowing me to participate in this patient's care.  He has followup as planned.         DEDRICK STEVENS, THOMAS             D: 2018 15:20   T: 2018 22:32   MT: PATRICK      Name:     GREGORIO BRUSH   MRN:      [id number]        Account:      [id number]   :      1950           Service Date: 2018      Document: [id number]

## 2018-01-19 ENCOUNTER — HOSPITAL ENCOUNTER (OUTPATIENT)
Facility: CLINIC | Age: 68
Setting detail: OBSERVATION
Discharge: HOME | End: 2018-01-21
Attending: INTERNAL MEDICINE | Admitting: INTERNAL MEDICINE
Payer: MEDICARE

## 2018-01-19 ENCOUNTER — TRANSFERRED RECORDS (OUTPATIENT)
Dept: HEALTH INFORMATION MANAGEMENT | Facility: CLINIC | Age: 68
End: 2018-01-19

## 2018-01-19 ENCOUNTER — APPOINTMENT (OUTPATIENT)
Dept: GENERAL RADIOLOGY | Facility: CLINIC | Age: 68
End: 2018-01-19
Attending: INTERNAL MEDICINE
Payer: MEDICARE

## 2018-01-19 DIAGNOSIS — J21.0 RSV BRONCHIOLITIS: ICD-10-CM

## 2018-01-19 DIAGNOSIS — J45.901 MILD ASTHMA WITH EXACERBATION, UNSPECIFIED WHETHER PERSISTENT: Primary | ICD-10-CM

## 2018-01-19 PROBLEM — J40 BRONCHITIS: Status: ACTIVE | Noted: 2018-01-19

## 2018-01-19 LAB
GLUCOSE BLDC GLUCOMTR-MCNC: 181 MG/DL (ref 70–99)
HBA1C MFR BLD: NORMAL % (ref 4.3–6)

## 2018-01-19 PROCEDURE — 25000131 ZZH RX MED GY IP 250 OP 636 PS 637: Mod: GY | Performed by: INTERNAL MEDICINE

## 2018-01-19 PROCEDURE — 87631 RESP VIRUS 3-5 TARGETS: CPT | Performed by: INTERNAL MEDICINE

## 2018-01-19 PROCEDURE — 00000146 ZZHCL STATISTIC GLUCOSE BY METER IP

## 2018-01-19 PROCEDURE — 25000125 ZZHC RX 250: Performed by: INTERNAL MEDICINE

## 2018-01-19 PROCEDURE — 71046 X-RAY EXAM CHEST 2 VIEWS: CPT

## 2018-01-19 PROCEDURE — A9270 NON-COVERED ITEM OR SERVICE: HCPCS | Mod: GY | Performed by: INTERNAL MEDICINE

## 2018-01-19 PROCEDURE — 25000132 ZZH RX MED GY IP 250 OP 250 PS 637: Mod: GY | Performed by: INTERNAL MEDICINE

## 2018-01-19 PROCEDURE — 99220 ZZC INITIAL OBSERVATION CARE,LEVL III: CPT | Performed by: INTERNAL MEDICINE

## 2018-01-19 PROCEDURE — G0378 HOSPITAL OBSERVATION PER HR: HCPCS

## 2018-01-19 RX ORDER — GABAPENTIN 300 MG/1
300 CAPSULE ORAL EVERY MORNING
Status: DISCONTINUED | OUTPATIENT
Start: 2018-01-20 | End: 2018-01-21 | Stop reason: HOSPADM

## 2018-01-19 RX ORDER — ATORVASTATIN CALCIUM 40 MG/1
40 TABLET, FILM COATED ORAL AT BEDTIME
Status: DISCONTINUED | OUTPATIENT
Start: 2018-01-19 | End: 2018-01-21 | Stop reason: HOSPADM

## 2018-01-19 RX ORDER — OMEGA-3-ACID ETHYL ESTERS 1 G/1
2 CAPSULE, LIQUID FILLED ORAL 2 TIMES DAILY
COMMUNITY
End: 2020-01-01

## 2018-01-19 RX ORDER — ONDANSETRON 4 MG/1
4 TABLET, ORALLY DISINTEGRATING ORAL EVERY 6 HOURS PRN
Status: DISCONTINUED | OUTPATIENT
Start: 2018-01-19 | End: 2018-01-21 | Stop reason: HOSPADM

## 2018-01-19 RX ORDER — ALBUTEROL SULFATE 90 UG/1
2 AEROSOL, METERED RESPIRATORY (INHALATION) EVERY 6 HOURS PRN
Status: DISCONTINUED | OUTPATIENT
Start: 2018-01-19 | End: 2018-01-21 | Stop reason: HOSPADM

## 2018-01-19 RX ORDER — CLOPIDOGREL BISULFATE 75 MG/1
75 TABLET ORAL EVERY EVENING
Status: DISCONTINUED | OUTPATIENT
Start: 2018-01-19 | End: 2018-01-21 | Stop reason: HOSPADM

## 2018-01-19 RX ORDER — RITONAVIR 100 MG/1
100 TABLET ORAL AT BEDTIME
Status: DISCONTINUED | OUTPATIENT
Start: 2018-01-19 | End: 2018-01-21 | Stop reason: HOSPADM

## 2018-01-19 RX ORDER — LOSARTAN POTASSIUM 50 MG/1
50 TABLET ORAL EVERY EVENING
Status: DISCONTINUED | OUTPATIENT
Start: 2018-01-19 | End: 2018-01-21 | Stop reason: HOSPADM

## 2018-01-19 RX ORDER — TEMAZEPAM 7.5 MG/1
7.5 CAPSULE ORAL
Status: DISCONTINUED | OUTPATIENT
Start: 2018-01-19 | End: 2018-01-21 | Stop reason: HOSPADM

## 2018-01-19 RX ORDER — ALBUTEROL SULFATE 0.83 MG/ML
2.5 SOLUTION RESPIRATORY (INHALATION)
Status: DISCONTINUED | OUTPATIENT
Start: 2018-01-19 | End: 2018-01-21 | Stop reason: HOSPADM

## 2018-01-19 RX ORDER — GABAPENTIN 300 MG/1
600 CAPSULE ORAL EVERY EVENING
Status: DISCONTINUED | OUTPATIENT
Start: 2018-01-19 | End: 2018-01-21 | Stop reason: HOSPADM

## 2018-01-19 RX ORDER — AMLODIPINE BESYLATE 5 MG/1
5 TABLET ORAL DAILY
Status: DISCONTINUED | OUTPATIENT
Start: 2018-01-20 | End: 2018-01-21 | Stop reason: HOSPADM

## 2018-01-19 RX ORDER — ISOSORBIDE MONONITRATE 60 MG/1
120 TABLET, EXTENDED RELEASE ORAL EVERY EVENING
Status: DISCONTINUED | OUTPATIENT
Start: 2018-01-19 | End: 2018-01-21 | Stop reason: HOSPADM

## 2018-01-19 RX ORDER — ACETAMINOPHEN 325 MG/1
650 TABLET ORAL EVERY 4 HOURS PRN
Status: DISCONTINUED | OUTPATIENT
Start: 2018-01-19 | End: 2018-01-21 | Stop reason: HOSPADM

## 2018-01-19 RX ORDER — DAPSONE 100 MG/1
100 TABLET ORAL AT BEDTIME
Status: DISCONTINUED | OUTPATIENT
Start: 2018-01-19 | End: 2018-01-21 | Stop reason: HOSPADM

## 2018-01-19 RX ORDER — HYDRALAZINE HYDROCHLORIDE 25 MG/1
25 TABLET, FILM COATED ORAL 2 TIMES DAILY
Status: DISCONTINUED | OUTPATIENT
Start: 2018-01-19 | End: 2018-01-21 | Stop reason: HOSPADM

## 2018-01-19 RX ORDER — NICOTINE POLACRILEX 4 MG
15-30 LOZENGE BUCCAL
Status: DISCONTINUED | OUTPATIENT
Start: 2018-01-19 | End: 2018-01-21 | Stop reason: HOSPADM

## 2018-01-19 RX ORDER — ACETAMINOPHEN 650 MG/1
650 SUPPOSITORY RECTAL EVERY 4 HOURS PRN
Status: DISCONTINUED | OUTPATIENT
Start: 2018-01-19 | End: 2018-01-21 | Stop reason: HOSPADM

## 2018-01-19 RX ORDER — PREDNISONE 20 MG/1
40 TABLET ORAL DAILY
Status: DISCONTINUED | OUTPATIENT
Start: 2018-01-19 | End: 2018-01-21 | Stop reason: HOSPADM

## 2018-01-19 RX ORDER — NALOXONE HYDROCHLORIDE 0.4 MG/ML
.1-.4 INJECTION, SOLUTION INTRAMUSCULAR; INTRAVENOUS; SUBCUTANEOUS
Status: DISCONTINUED | OUTPATIENT
Start: 2018-01-19 | End: 2018-01-21 | Stop reason: HOSPADM

## 2018-01-19 RX ORDER — DEXTROSE MONOHYDRATE 25 G/50ML
25-50 INJECTION, SOLUTION INTRAVENOUS
Status: DISCONTINUED | OUTPATIENT
Start: 2018-01-19 | End: 2018-01-21 | Stop reason: HOSPADM

## 2018-01-19 RX ORDER — CALCIUM CARBONATE 500 MG/1
1500 TABLET, CHEWABLE ORAL 4 TIMES DAILY
Status: DISCONTINUED | OUTPATIENT
Start: 2018-01-19 | End: 2018-01-21 | Stop reason: HOSPADM

## 2018-01-19 RX ORDER — ABACAVIR 300 MG/1
600 TABLET ORAL EVERY EVENING
Status: DISCONTINUED | OUTPATIENT
Start: 2018-01-19 | End: 2018-01-21 | Stop reason: HOSPADM

## 2018-01-19 RX ORDER — ONDANSETRON 2 MG/ML
4 INJECTION INTRAMUSCULAR; INTRAVENOUS EVERY 6 HOURS PRN
Status: DISCONTINUED | OUTPATIENT
Start: 2018-01-19 | End: 2018-01-21 | Stop reason: HOSPADM

## 2018-01-19 RX ORDER — MYCOPHENOLATE MOFETIL 250 MG/1
500 CAPSULE ORAL 2 TIMES DAILY
Status: DISCONTINUED | OUTPATIENT
Start: 2018-01-19 | End: 2018-01-21 | Stop reason: HOSPADM

## 2018-01-19 RX ORDER — CLONAZEPAM 0.5 MG/1
0.5 TABLET ORAL
Status: DISCONTINUED | OUTPATIENT
Start: 2018-01-19 | End: 2018-01-21 | Stop reason: HOSPADM

## 2018-01-19 RX ORDER — POLYETHYLENE GLYCOL 3350 17 G/17G
17 POWDER, FOR SOLUTION ORAL DAILY PRN
Status: DISCONTINUED | OUTPATIENT
Start: 2018-01-19 | End: 2018-01-21 | Stop reason: HOSPADM

## 2018-01-19 RX ADMIN — ABACAVIR 600 MG: 300 TABLET, FILM COATED ORAL at 23:51

## 2018-01-19 RX ADMIN — LOSARTAN POTASSIUM 50 MG: 50 TABLET, FILM COATED ORAL at 23:28

## 2018-01-19 RX ADMIN — ISOSORBIDE MONONITRATE 120 MG: 60 TABLET, EXTENDED RELEASE ORAL at 23:31

## 2018-01-19 RX ADMIN — DAPSONE 100 MG: 100 TABLET ORAL at 23:28

## 2018-01-19 RX ADMIN — MYCOPHENOLATE MOFETIL 500 MG: 250 CAPSULE ORAL at 23:30

## 2018-01-19 RX ADMIN — CALCIUM CARBONATE (ANTACID) CHEW TAB 500 MG 1500 MG: 500 CHEW TAB at 23:31

## 2018-01-19 RX ADMIN — RITONAVIR 100 MG: 100 TABLET, FILM COATED ORAL at 23:50

## 2018-01-19 RX ADMIN — PREDNISONE 40 MG: 20 TABLET ORAL at 21:25

## 2018-01-19 RX ADMIN — HYDRALAZINE HYDROCHLORIDE 25 MG: 25 TABLET ORAL at 23:30

## 2018-01-19 RX ADMIN — ATORVASTATIN CALCIUM 40 MG: 40 TABLET, FILM COATED ORAL at 23:29

## 2018-01-19 RX ADMIN — DARUNAVIR 800 MG: 800 TABLET, FILM COATED ORAL at 23:51

## 2018-01-19 RX ADMIN — DOLUTEGRAVIR SODIUM 50 MG: 50 TABLET, FILM COATED ORAL at 23:51

## 2018-01-19 RX ADMIN — CLOPIDOGREL 75 MG: 75 TABLET, FILM COATED ORAL at 23:29

## 2018-01-19 RX ADMIN — GABAPENTIN 600 MG: 300 CAPSULE ORAL at 23:27

## 2018-01-19 RX ADMIN — Medication 1 CAPSULE: at 23:30

## 2018-01-19 NOTE — LETTER
Transition Communication Hand-off for Care Transitions to Next Level of Care Provider    Hand-off for Care Transitions to Next Level of Care Provider  Name: Donald Raza  : 1950  MRN #: [id number]  Reason for Hospitalization:  Cough, SOB.  Bronchitis  Admit Date/Time: 2018  7:20 PM  Discharge Date: 2018    Reason for Communication Hand-off Referral: Other  RSV, Asthma exacerbation  Discharge Plan:  Discharged to: Home with support                   Patient agreeable to post-hospital support suggestions:  Yes    Patient is on new medications:   Yes   benzonatate 100 MG capsule. Take 1 capsule (100 mg) by mouth 3 times daily as  needed for cough   predniSONE 20 MG tablet. Start taking on: 2018  Take 2 tablets (40 mg) by  mouth daily for 4 days           [facility] follow up recommended: No Tel-Assurance program:  Declined  Follow-up specialty is recommended: Yes. Follow up with Dr. Arnoldo Diaz at [facility].   Follow-up plan:  Future Appointments  Date Time Provider Department Center   2018 9:45 AM Arnoldo Diaz MD [facility]   Any outstanding tests or procedures:  No. Follow your usual dialysis sessions routine.       Key Recommendations:  He was seen at [facility] and requested to be admitted here to [facility] for his symptoms.  Evaluation did not show pneumonia.  Testing was positive for RSV.  Symptoms improved with prednisone po for asthma exacerbation. Please evaluate him for further improvement in symptoms.     Communicated handoff via EPIC Comm Mgt to Joe Thomas's CC at [phone].      Ruby Hinojosa RN, BSN, CTS  [facility]  [phone]  AVS/Discharge Summary is the source of truth; this is a helpful guide for improved communication of patient story

## 2018-01-19 NOTE — IP AVS SNAPSHOT
MRN:[id number]                      After Visit Summary   1/19/2018    Donald Raza    MRN: [id number]           Thank you!     Thank you for choosing [facility] for your care. Our goal is always to provide you with excellent care. Hearing back from our patients is one way we can continue to improve our services. Please take a few minutes to complete the written survey that you may receive in the mail after you visit. If you would like to speak to someone directly about your visit please contact Patient Relations at [phone]. Thank you!          Patient Information     Date Of Birth          1950        About your hospital stay     You were admitted on:  January 19, 2018 You last received care in the:  [place] [address] [facility]    You were discharged on:  January 21, 2018        Reason for your hospital stay       You were hospitalized for a presumed respiratory infection and you tested positive for RSV infection.  This is a self limited viral infection that should continue to improve over the next few days.  You also are prescribed a short course of prednisone for an asthma exacerbation.                  Who to Call     For medical emergencies, please call 911.  For non-urgent questions about your medical care, please call your primary care provider or clinic, [phone]          Attending Provider     Provider Specialty    Jose De Jesus Orozco MD Internal Medicine    [facility] Internal Medicine       Primary Care Provider Office Phone # Fax #    Aida Thomas [phone]      After Care Instructions     Activity       Your activity upon discharge: activity as tolerated            Diet       Follow this diet upon discharge: Orders Placed This Encounter      Snacks/Supplements Adult: Nepro Oral Supplement; With Meals      Dialysis Diet                  Follow-up Appointments     Follow-up and recommended labs and tests        Follow up  "with primary care provider, Aida Thomas, as needed if symptoms persist > 1 week    Follow your routine dialysis sessions                  Your next 10 appointments already scheduled     2018  9:45 AM CST   Return Visit with Arnoldo Diaz MD   [facility] ([facility])    [address] [place], [place] [id number]  [facility] [id number] [phone]              Pending Results     No orders found from 2018 to 2018.            Statement of Approval     Ordered          18 1140  I have reviewed and agree with all the recommendations and orders detailed in this document.  EFFECTIVE NOW     Approved and electronically signed by:  Ezra Fortune MD             Admission Information     Date & Time Provider Department Dept. Phone    2018 Nelson Worthy MD [place] [address] [facility] [phone]      Your Vitals Were     Blood Pressure Pulse Temperature Respirations Height Weight    140/57 78 96.8  F (36  C) (Oral) 18 1.803 m (5' 11\") 85.3 kg (188 lb)    Pulse Oximetry BMI (Body Mass Index)                94% 26.22 kg/m2          MyChart Information     [employer] lets you send messages to your doctor, view your test results, renew your prescriptions, schedule appointments and more. To sign up, go to [email or web address] . Click on \"Log in\" on the left side of the screen, which will take you to the Welcome page. Then click on \"Sign up Now\" on the right side of the page.     You will be asked to enter the access code listed below, as well as some personal information. Please follow the directions to create your username and password.     Your access code is: [email or web address]  Expires: 2018  1:13 PM     Your access code will  in 90 days. If you need help or a new code, please call your [facility] or [phone].        Care EveryWhere ID     This is your Care EveryWhere ID. This could be used by other organizations to " access your [place] medical records  [id number]        Equal Access to Services     JOON KERN : Hadii aad ku hadtoshiachema Dolan, wamarioda marlena, qajaydata katiannalaci núñez, fatoumata ottshelleysae olivo. So [facility] [phone].    ATENCIÓN: Si habla español, tiene a ayala disposición servicios gratuitos de asistencia lingüística. Llame al [phone].    We comply with applicable federal civil rights laws and [place] laws. We do not discriminate on the basis of race, color, national origin, age, disability, sex, sexual orientation, or gender identity.               Review of your medicines      START taking        Dose / Directions    benzonatate 100 MG capsule   Commonly known as:  TESSALON   Used for:  RSV bronchiolitis        Dose:  100 mg   Take 1 capsule (100 mg) by mouth 3 times daily as needed for cough   Quantity:  42 capsule   Refills:  0       predniSONE 20 MG tablet   Commonly known as:  DELTASONE   Used for:  Mild asthma with exacerbation, unspecified whether persistent        Dose:  40 mg   Start taking on:  1/22/2018   Take 2 tablets (40 mg) by mouth daily for 4 days   Quantity:  8 tablet   Refills:  0         CONTINUE these medicines which have NOT CHANGED        Dose / Directions    abacavir 300 MG tablet   Commonly known as:  ZIAGEN        Dose:  600 mg   Take 600 mg by mouth every evening   Refills:  0       albuterol 108 (90 BASE) MCG/ACT Inhaler   Commonly known as:  PROAIR HFA/PROVENTIL HFA/VENTOLIN HFA   Used for:  Cough        Dose:  2 puff   Inhale 2 puffs into the lungs every 6 hours as needed for shortness of breath / dyspnea or wheezing   Quantity:  1 Inhaler   Refills:  1       AMLODIPINE BESYLATE PO        Dose:  5 mg   Take 5 mg by mouth daily   Refills:  0       atorvastatin 40 MG tablet   Commonly known as:  LIPITOR        Dose:  40 mg   Take 40 mg by mouth At Bedtime   Refills:  0       B COMPLEX-C-FOLIC ACID PO        Dose:  1 tablet   Take 1 tablet by mouth At Bedtime    Refills:  0       calcium carbonate 500 MG chewable tablet   Commonly known as:  TUMS        Dose:  3 chew tab   Take 3 chew tab by mouth 4 times daily   Refills:  0       chlorhexidine 0.12 % solution   Commonly known as:  PERIDEX        Rinse and gargle 15 ml by mouth twice a day as directed.   Refills:  0       CLONAZEPAM PO        Dose:  0.5 mg   Take 0.5 mg by mouth nightly as needed for anxiety (restless legs)   Refills:  0       CLOPIDOGREL BISULFATE PO        Dose:  75 mg   Take 75 mg by mouth every evening   Refills:  0       dapsone 100 MG tablet        Dose:  100 mg   Take 100 mg by mouth At Bedtime   Refills:  0       dolutegravir 50 MG tablet   Commonly known as:  TIVICAY        Dose:  50 mg   Take 50 mg by mouth At Bedtime   Refills:  0       DOXERCALCIFEROL IV        Dose:  6 mcg   Inject 6 mcg into the vein three times a week (with dialysis)   Refills:  0       epoetin brittni 36102 UNIT/ML injection   Commonly known as:  EPOGEN/PROCRIT        Dose:  64872 Units   Inject 11,000 Units Subcutaneous three times a week WITH DIALYSIS   Refills:  0       * gabapentin 300 MG capsule   Commonly known as:  NEURONTIN        Dose:  300 mg   Take 300 mg by mouth every morning   Refills:  0       * gabapentin 300 MG capsule   Commonly known as:  NEURONTIN        Dose:  600 mg   Take 600 mg by mouth every evening   Refills:  0       HUMALOG PEN SC        Take 15 units TID and an additional 2 units if BG is over 150   Refills:  0       HYDRALAZINE HCL PO        Dose:  25 mg   Take 25 mg by mouth 2 times daily   Refills:  0       imiquimod 5 % cream   Commonly known as:  ALDARA        Apply topically as needed   Refills:  0       insulin glargine 100 UNIT/ML injection   Commonly known as:  LANTUS        Dose:  50 Units   Inject 50 Units Subcutaneous daily Takes about noon   Refills:  0       iron sucrose 20 MG/ML injection   Commonly known as:  VENOFER        Dose:  50 mg   Inject 50 mg into the vein once a week WIth  "dialysis   Refills:  0       Isosorbide Mononitrate  MG Tb24   Used for:  Coronary artery disease involving native heart, angina presence unspecified, unspecified vessel or lesion type, Hypertension secondary to other renal disorders        Dose:  120 mg   Take 1 tablet (120 mg) by mouth every evening   Quantity:  30 tablet   Refills:  11       lamISIL AT 1 % cream   Generic drug:  terbinafine        Apply topically 2 times daily as needed   Refills:  0       losartan 50 MG tablet   Commonly known as:  COZAAR   Used for:  Hypertension secondary to other renal disorders        Dose:  50 mg   Take 1 tablet (50 mg) by mouth every evening   Quantity:  30 tablet   Refills:  3       MAGNESIUM OXIDE PO        Dose:  400 mg   Take 400 mg by mouth At Bedtime   Refills:  0       mycophenolate 500 MG tablet   Commonly known as:  GENERIC EQUIVALENT   Used for:  ESRD (end stage renal disease) on dialysis (H)        Dose:  500 mg   Take 1 tablet (500 mg) by mouth 2 times daily On an empty stomach   Refills:  0       NEPRO PO        1-3 times daily   Refills:  0       nitroGLYcerin 0.4 MG sublingual tablet   Commonly known as:  NITROSTAT   Used for:  Coronary artery disease due to lipid rich plaque, Status post coronary angioplasty        One tablet under the tongue every 5 minutes if needed for chest pain. May repeat every 5 minutes for a maximum of 3 doses in 15 minutes\"   Quantity:  25 tablet   Refills:  3       omega-3 acid ethyl esters 1 G capsule   Commonly known as:  Lovaza        Dose:  2 g   Take 2 g by mouth 2 times daily   Refills:  0       order for DME   Used for:  SOB (shortness of breath), Generalized muscle weakness        Equipment being ordered: Other: 4WW Treatment Diagnosis: Decreased activity tolerance   Quantity:  1 each   Refills:  0       PREZISTA PO        Dose:  800 mg   Take 800 mg by mouth At Bedtime.   Refills:  0       priLOSEC 40 MG capsule   Generic drug:  omeprazole        Dose:  40 mg   Take " 40 mg by mouth daily 1 hour before dinner   Refills:  0       ritonavir 100 MG capsule   Commonly known as:  NORVIR        Dose:  1 capsule   Take 1 capsule by mouth At Bedtime   Refills:  0       * Notice:  This list has 2 medication(s) that are the same as other medications prescribed for you. Read the directions carefully, and ask your doctor or other care provider to review them with you.         Where to get your medicines      These medications were sent to [place], [place] - [address] [facility]  [address] [facility] [address]     Phone:  [phone]     benzonatate 100 MG capsule    predniSONE 20 MG tablet                Protect others around you: Learn how to safely use, store and throw away your medicines at www.disposemymeds.org.             Medication List: This is a list of all your medications and when to take them. Check marks below indicate your daily home schedule. Keep this list as a reference.      Medications           Morning Afternoon Evening Bedtime As Needed    abacavir 300 MG tablet   Commonly known as:  ZIAGEN   Take 600 mg by mouth every evening   Last time this was given:  600 mg on 1/20/2018  7:15 PM                                   albuterol 108 (90 BASE) MCG/ACT Inhaler   Commonly known as:  PROAIR HFA/PROVENTIL HFA/VENTOLIN HFA   Inhale 2 puffs into the lungs every 6 hours as needed for shortness of breath / dyspnea or wheezing                                   AMLODIPINE BESYLATE PO   Take 5 mg by mouth daily   Last time this was given:  5 mg on 1/21/2018  8:56 AM                                   atorvastatin 40 MG tablet   Commonly known as:  LIPITOR   Take 40 mg by mouth At Bedtime   Last time this was given:  40 mg on 1/20/2018 10:02 PM                                   B COMPLEX-C-FOLIC ACID PO   Take 1 tablet by mouth At Bedtime                                   benzonatate 100 MG capsule   Commonly known as:  TESSALON   Take 1  capsule (100 mg) by mouth 3 times daily as needed for cough                                   calcium carbonate 500 MG chewable tablet   Commonly known as:  TUMS   Take 3 chew tab by mouth 4 times daily   Last time this was given:  1,500 mg on 1/21/2018  1:04 PM                                            chlorhexidine 0.12 % solution   Commonly known as:  PERIDEX   Rinse and gargle 15 ml by mouth twice a day as directed.                                CLONAZEPAM PO   Take 0.5 mg by mouth nightly as needed for anxiety (restless legs)                                   CLOPIDOGREL BISULFATE PO   Take 75 mg by mouth every evening   Last time this was given:  75 mg on 1/20/2018  7:16 PM                                   dapsone 100 MG tablet   Take 100 mg by mouth At Bedtime   Last time this was given:  100 mg on 1/20/2018 10:03 PM                                   dolutegravir 50 MG tablet   Commonly known as:  TIVICAY   Take 50 mg by mouth At Bedtime   Last time this was given:  50 mg on 1/20/2018 10:02 PM                                   DOXERCALCIFEROL IV   Inject 6 mcg into the vein three times a week (with dialysis)                                epoetin brittni 07415 UNIT/ML injection   Commonly known as:  EPOGEN/PROCRIT   Inject 11,000 Units Subcutaneous three times a week WITH DIALYSIS   Last time this was given:  1/20/2018  4:13 PM                                * gabapentin 300 MG capsule   Commonly known as:  NEURONTIN   Take 300 mg by mouth every morning   Last time this was given:  300 mg on 1/21/2018  8:56 AM                                   * gabapentin 300 MG capsule   Commonly known as:  NEURONTIN   Take 600 mg by mouth every evening   Last time this was given:  300 mg on 1/21/2018  8:56 AM                                   HUMALOG PEN SC   Take 15 units TID and an additional 2 units if BG is over 150                                HYDRALAZINE HCL PO   Take 25 mg by mouth 2 times daily   Last time this  "was given:  25 mg on 1/21/2018  8:56 AM                                      imiquimod 5 % cream   Commonly known as:  ALDARA   Apply topically as needed                                   insulin glargine 100 UNIT/ML injection   Commonly known as:  LANTUS   Inject 50 Units Subcutaneous daily Takes about noon                                iron sucrose 20 MG/ML injection   Commonly known as:  VENOFER   Inject 50 mg into the vein once a week WIth dialysis                                Isosorbide Mononitrate  MG Tb24   Take 1 tablet (120 mg) by mouth every evening   Last time this was given:  120 mg on 1/20/2018  7:16 PM                                   lamISIL AT 1 % cream   Apply topically 2 times daily as needed   Generic drug:  terbinafine                                losartan 50 MG tablet   Commonly known as:  COZAAR   Take 1 tablet (50 mg) by mouth every evening   Last time this was given:  50 mg on 1/20/2018  7:15 PM                                   MAGNESIUM OXIDE PO   Take 400 mg by mouth At Bedtime                                   mycophenolate 500 MG tablet   Commonly known as:  GENERIC EQUIVALENT   Take 1 tablet (500 mg) by mouth 2 times daily On an empty stomach                                      NEPRO PO   1-3 times daily                                         nitroGLYcerin 0.4 MG sublingual tablet   Commonly known as:  NITROSTAT   One tablet under the tongue every 5 minutes if needed for chest pain. May repeat every 5 minutes for a maximum of 3 doses in 15 minutes\"                                   omega-3 acid ethyl esters 1 G capsule   Commonly known as:  Lovaza   Take 2 g by mouth 2 times daily                                      order for DME   Equipment being ordered: Other: 4WW Treatment Diagnosis: Decreased activity tolerance                                predniSONE 20 MG tablet   Commonly known as:  DELTASONE   Take 2 tablets (40 mg) by mouth daily for 4 days   Start taking on:  " 1/22/2018   Last time this was given:  40 mg on 1/21/2018  8:56 AM                                   PREZISTA PO   Take 800 mg by mouth At Bedtime.   Last time this was given:  800 mg on 1/20/2018 10:01 PM                                   priLOSEC 40 MG capsule   Take 40 mg by mouth daily 1 hour before dinner   Last time this was given:  40 mg on 1/20/2018  6:56 PM   Generic drug:  omeprazole                                   ritonavir 100 MG capsule   Commonly known as:  NORVIR   Take 1 capsule by mouth At Bedtime                                   * Notice:  This list has 2 medication(s) that are the same as other medications prescribed for you. Read the directions carefully, and ask your doctor or other care provider to review them with you.

## 2018-01-19 NOTE — IP AVS SNAPSHOT
[place] [address] [facility]    [address]    [facility] [address]    Phone:  [phone]    Fax:  [phone]                                       After Visit Summary   1/19/2018    Donald Raza    MRN: [id number]           After Visit Summary Signature Page     I have received my discharge instructions, and my questions have been answered. I have discussed any challenges I see with this plan with the nurse or doctor.    ..........................................................................................................................................  Patient/Patient Representative Signature      ..........................................................................................................................................  Patient Representative Print Name and Relationship to Patient    ..................................................               ................................................  Date                                            Time    ..........................................................................................................................................  Reviewed by Signature/Title    ...................................................              ..............................................  Date                                                            Time

## 2018-01-20 LAB
ANION GAP SERPL CALCULATED.3IONS-SCNC: 10 MMOL/L (ref 3–14)
BUN SERPL-MCNC: 61 MG/DL (ref 7–30)
CALCIUM SERPL-MCNC: 9.2 MG/DL (ref 8.5–10.1)
CHLORIDE SERPL-SCNC: 96 MMOL/L (ref 94–109)
CO2 SERPL-SCNC: 25 MMOL/L (ref 20–32)
CREAT SERPL-MCNC: 9.1 MG/DL (ref 0.66–1.25)
ERYTHROCYTE [DISTWIDTH] IN BLOOD BY AUTOMATED COUNT: 15.6 % (ref 10–15)
FLUAV+FLUBV RNA SPEC QL NAA+PROBE: NEGATIVE
FLUAV+FLUBV RNA SPEC QL NAA+PROBE: NEGATIVE
GFR SERPL CREATININE-BSD FRML MDRD: 6 ML/MIN/1.7M2
GLUCOSE BLDC GLUCOMTR-MCNC: 105 MG/DL (ref 70–99)
GLUCOSE BLDC GLUCOMTR-MCNC: 231 MG/DL (ref 70–99)
GLUCOSE BLDC GLUCOMTR-MCNC: 268 MG/DL (ref 70–99)
GLUCOSE BLDC GLUCOMTR-MCNC: 327 MG/DL (ref 70–99)
GLUCOSE BLDC GLUCOMTR-MCNC: 350 MG/DL (ref 70–99)
GLUCOSE SERPL-MCNC: 341 MG/DL (ref 70–99)
HCT VFR BLD AUTO: 25.5 % (ref 40–53)
HGB BLD-MCNC: 8.1 G/DL (ref 13.3–17.7)
MCH RBC QN AUTO: 28.9 PG (ref 26.5–33)
MCHC RBC AUTO-ENTMCNC: 31.8 G/DL (ref 31.5–36.5)
MCV RBC AUTO: 91 FL (ref 78–100)
PLATELET # BLD AUTO: 117 10E9/L (ref 150–450)
POTASSIUM SERPL-SCNC: 4.7 MMOL/L (ref 3.4–5.3)
RBC # BLD AUTO: 2.8 10E12/L (ref 4.4–5.9)
RSV RNA SPEC NAA+PROBE: POSITIVE
SODIUM SERPL-SCNC: 131 MMOL/L (ref 133–144)
SPECIMEN SOURCE: ABNORMAL
WBC # BLD AUTO: 3.4 10E9/L (ref 4–11)

## 2018-01-20 PROCEDURE — 63400005 ZZH RX 634: Performed by: INTERNAL MEDICINE

## 2018-01-20 PROCEDURE — G0499 HEPB SCREEN HIGH RISK INDIV: HCPCS | Performed by: INTERNAL MEDICINE

## 2018-01-20 PROCEDURE — 25000132 ZZH RX MED GY IP 250 OP 250 PS 637: Mod: GY | Performed by: INTERNAL MEDICINE

## 2018-01-20 PROCEDURE — G0378 HOSPITAL OBSERVATION PER HR: HCPCS

## 2018-01-20 PROCEDURE — 85027 COMPLETE CBC AUTOMATED: CPT | Performed by: INTERNAL MEDICINE

## 2018-01-20 PROCEDURE — 25000131 ZZH RX MED GY IP 250 OP 636 PS 637: Mod: GY | Performed by: INTERNAL MEDICINE

## 2018-01-20 PROCEDURE — 86706 HEP B SURFACE ANTIBODY: CPT | Performed by: INTERNAL MEDICINE

## 2018-01-20 PROCEDURE — G0257 UNSCHED DIALYSIS ESRD PT HOS: HCPCS

## 2018-01-20 PROCEDURE — 80048 BASIC METABOLIC PNL TOTAL CA: CPT | Performed by: INTERNAL MEDICINE

## 2018-01-20 PROCEDURE — 36415 COLL VENOUS BLD VENIPUNCTURE: CPT | Performed by: INTERNAL MEDICINE

## 2018-01-20 PROCEDURE — A9270 NON-COVERED ITEM OR SERVICE: HCPCS | Mod: GY | Performed by: INTERNAL MEDICINE

## 2018-01-20 PROCEDURE — 00000146 ZZHCL STATISTIC GLUCOSE BY METER IP

## 2018-01-20 PROCEDURE — 99207 ZZC CDG-CODE CATEGORY CHANGED: CPT | Performed by: INTERNAL MEDICINE

## 2018-01-20 PROCEDURE — 25000128 H RX IP 250 OP 636: Performed by: INTERNAL MEDICINE

## 2018-01-20 PROCEDURE — 25000125 ZZHC RX 250: Performed by: INTERNAL MEDICINE

## 2018-01-20 PROCEDURE — 90937 HEMODIALYSIS REPEATED EVAL: CPT

## 2018-01-20 PROCEDURE — 99225 ZZC SUBSEQUENT OBSERVATION CARE,LEVEL II: CPT | Performed by: INTERNAL MEDICINE

## 2018-01-20 RX ORDER — HEPARIN SODIUM 1000 [USP'U]/ML
500 INJECTION, SOLUTION INTRAVENOUS; SUBCUTANEOUS CONTINUOUS
Status: DISCONTINUED | OUTPATIENT
Start: 2018-01-20 | End: 2018-01-20

## 2018-01-20 RX ORDER — HEPARIN SODIUM 1000 [USP'U]/ML
500 INJECTION, SOLUTION INTRAVENOUS; SUBCUTANEOUS
Status: DISCONTINUED | OUTPATIENT
Start: 2018-01-20 | End: 2018-01-20

## 2018-01-20 RX ADMIN — AMLODIPINE BESYLATE 5 MG: 5 TABLET ORAL at 08:30

## 2018-01-20 RX ADMIN — DAPSONE 100 MG: 100 TABLET ORAL at 22:03

## 2018-01-20 RX ADMIN — INSULIN GLARGINE 50 UNITS: 100 INJECTION, SOLUTION SUBCUTANEOUS at 08:46

## 2018-01-20 RX ADMIN — CALCIUM CARBONATE (ANTACID) CHEW TAB 500 MG 1500 MG: 500 CHEW TAB at 18:56

## 2018-01-20 RX ADMIN — RITONAVIR 100 MG: 100 TABLET, FILM COATED ORAL at 22:01

## 2018-01-20 RX ADMIN — MYCOPHENOLATE MOFETIL 500 MG: 250 CAPSULE ORAL at 08:30

## 2018-01-20 RX ADMIN — SODIUM CHLORIDE 250 ML: 9 INJECTION, SOLUTION INTRAVENOUS at 16:13

## 2018-01-20 RX ADMIN — ACETAMINOPHEN 650 MG: 325 TABLET, FILM COATED ORAL at 22:12

## 2018-01-20 RX ADMIN — DOLUTEGRAVIR SODIUM 50 MG: 50 TABLET, FILM COATED ORAL at 22:02

## 2018-01-20 RX ADMIN — ERYTHROPOIETIN 15000 UNITS: 10000 INJECTION, SOLUTION INTRAVENOUS; SUBCUTANEOUS at 16:13

## 2018-01-20 RX ADMIN — CLOPIDOGREL 75 MG: 75 TABLET, FILM COATED ORAL at 19:16

## 2018-01-20 RX ADMIN — ISOSORBIDE MONONITRATE 120 MG: 60 TABLET, EXTENDED RELEASE ORAL at 19:16

## 2018-01-20 RX ADMIN — INSULIN ASPART 2 UNITS: 100 INJECTION, SOLUTION INTRAVENOUS; SUBCUTANEOUS at 22:04

## 2018-01-20 RX ADMIN — SODIUM CHLORIDE 250 ML: 9 INJECTION, SOLUTION INTRAVENOUS at 16:14

## 2018-01-20 RX ADMIN — LOSARTAN POTASSIUM 50 MG: 50 TABLET, FILM COATED ORAL at 19:15

## 2018-01-20 RX ADMIN — MYCOPHENOLATE MOFETIL 500 MG: 250 CAPSULE ORAL at 22:02

## 2018-01-20 RX ADMIN — GABAPENTIN 300 MG: 300 CAPSULE ORAL at 08:31

## 2018-01-20 RX ADMIN — ABACAVIR 600 MG: 300 TABLET, FILM COATED ORAL at 19:15

## 2018-01-20 RX ADMIN — OMEPRAZOLE 40 MG: 20 CAPSULE, DELAYED RELEASE ORAL at 18:56

## 2018-01-20 RX ADMIN — CALCIUM CARBONATE (ANTACID) CHEW TAB 500 MG 1500 MG: 500 CHEW TAB at 22:02

## 2018-01-20 RX ADMIN — Medication 1 CAPSULE: at 22:02

## 2018-01-20 RX ADMIN — ATORVASTATIN CALCIUM 40 MG: 40 TABLET, FILM COATED ORAL at 22:02

## 2018-01-20 RX ADMIN — DARUNAVIR 800 MG: 800 TABLET, FILM COATED ORAL at 22:01

## 2018-01-20 RX ADMIN — CALCIUM CARBONATE (ANTACID) CHEW TAB 500 MG 1500 MG: 500 CHEW TAB at 12:20

## 2018-01-20 RX ADMIN — PREDNISONE 40 MG: 20 TABLET ORAL at 08:31

## 2018-01-20 RX ADMIN — HYDRALAZINE HYDROCHLORIDE 25 MG: 25 TABLET ORAL at 08:31

## 2018-01-20 RX ADMIN — HYDRALAZINE HYDROCHLORIDE 25 MG: 25 TABLET ORAL at 22:02

## 2018-01-20 RX ADMIN — CALCIUM CARBONATE (ANTACID) CHEW TAB 500 MG 1500 MG: 500 CHEW TAB at 08:30

## 2018-01-20 RX ADMIN — GABAPENTIN 600 MG: 300 CAPSULE ORAL at 19:16

## 2018-01-20 NOTE — PLAN OF CARE
"Problem: Patient Care Overview  Goal: Plan of Care/Patient Progress Review  Outcome: Improving  PRIMARY DIAGNOSIS: \"GENERIC\" NURSING  OUTPATIENT/OBSERVATION GOALS TO BE MET BEFORE DISCHARGE:  1. ADLs back to baseline: no    2. Activity and level of assistance: stand by assist}    3. Pain status: denies    4. Return to near baseline physical activity: no     Discharge Planner Nurse   Safe discharge environment identified: No  Barriers to discharge: Yes       Entered by: Meme Barnes 01/20/2018 2:12 PM     Please review provider order for any additional goals.   Nurse to notify provider when observation goals have been met and patient is ready for discharge.      "

## 2018-01-20 NOTE — CONSULTS
NUTRITION BRIEF NOTE    Consult received for patient/family request  [facility] RD/nutrition familiar with patient from past admits  Patient prefers Nepro TID with meals for protein push and dialysis diet restrictions. EPIC orders updated    Reina Longoria RDN, LD, CNSC  Pager - 3rd floor/ICU: [phone]  Pager - All other floors: [phone]  Pager - Weekend/holiday: [phone]  Office: [phone]

## 2018-01-20 NOTE — PLAN OF CARE
Problem: Patient Care Overview  Goal: Plan of Care/Patient Progress Review  Outcome: No Change  PRIMARY DIAGNOSIS: ACUTE PAIN  OUTPATIENT/OBSERVATION GOALS TO BE MET BEFORE DISCHARGE:  1. Pain Status: No Pain    2. Return to near baseline physical activity: Yes    3. Cleared for discharge by consultants (if involved): No    Discharge Planner Nurse   Safe discharge environment identified: Yes  Barriers to discharge: Yes, Nephrology Consult       Entered by: Stewart Villarreal 01/20/2018 1:36 AM     Please review provider order for any additional goals.   Nurse to notify provider when observation goals have been met and patient is ready for discharge.    VSS. Denies SOB/CP/Pain. A/Ox4. SBA. Positive for RSV. Droplet Isolation Maintained/Contact Isolation Initiated.

## 2018-01-20 NOTE — PLAN OF CARE
Problem: Patient Care Overview  Goal: Plan of Care/Patient Progress Review  Outcome: No Change  Pt arrived on unit from [facility] about 1930. Pt A/O cooperative, ambulated in room and hallway. Infrequent congested nonproductive cough. VSS, LS diminished with with expiratory wheezes. No c/o chest pain or difficulty breathing.

## 2018-01-20 NOTE — PROGRESS NOTES
Potassium   Date Value Ref Range Status   01/20/2018 4.7 3.4 - 5.3 mmol/L Final     Lab Results   Component Value Date    HGB 8.1 01/20/2018     Weight: 85.3 kg (188 lb)  POST WT 82.3 kg    DIALYSIS PROCEDURE NOTE  Hepatitis status of previous patient on machine log was checked and verified ok to use with this patients hepatitis status.    Patient dialyzed for 3.5 hrs. on a 2 K bath with a net fluid removal of  3L.  A BFR of 400 ml/min was obtained via a L AVF using 15 gauge needles.    The patient was seen by Dr. Wilkes during the treatment.  Total heparin received during the treatment: 0 units. Sites held x 15 min then  pressure drsgs applied.      Meds  Given: Epogen, see MAR     Complications: none.      Procedure and ESRD teaching done and questions answered. See flowsheet in EPIC for further details and post assessment.    Machine water alarm in place and functioning. Chlorine/Chloramine water system checked every 4 hours.    Pt returned via W/C.    Vascular Access: Aseptic prep done for both on/off.    Report received from: NATASHA Arcos RN    Report given to: DEMARCO Mendiola RN    HEPATITIS B SURFACE ANTIGEN Negative DATE 1/18/17 HEPATITIS B SURFACE ANTIBODY immune DATE 12/23/17    Outpatient Dialysis at [facility]      Laura Pavon RN

## 2018-01-20 NOTE — PLAN OF CARE
Problem: Patient Care Overview  Goal: Discharge Needs Assessment  Outcome: No Change  PRIMARY DIAGNOSIS: ACUTE PAIN  OUTPATIENT/OBSERVATION GOALS TO BE MET BEFORE DISCHARGE:  1. Pain Status: Pain free.    2. Return to near baseline physical activity: No    3. Cleared for discharge by consultants (if involved): No    Discharge Planner Nurse   Safe discharge environment identified: Yes  Barriers to discharge: No       Entered by: Cristopher Mendiola 01/19/2018 9:11 PM     Please review provider order for any additional goals.   Nurse to notify provider when observation goals have been met and patient is ready for discharge.

## 2018-01-20 NOTE — PROGRESS NOTES
01/20/18 0106   Significant Event   Significant Event Other (see comments)  ([facility] lab called, pt RSV (+))   Above information communicated to pt's bedside RN, Stewart Villarreal, by this RN. Pt placed in contact & droplet isolation/protocol.  Geetha Li, BSN, RN  Medical/Telemetry - 3

## 2018-01-20 NOTE — PROVIDER NOTIFICATION
0112 MD Paged: : FYI pt positive for RSV. Please advise and order as needed. Thank you.    0113: MD states no new orders at this time.

## 2018-01-20 NOTE — PLAN OF CARE
Problem: Patient Care Overview  Goal: Plan of Care/Patient Progress Review  Outcome: No Change  PRIMARY DIAGNOSIS: ACUTE PAIN  OUTPATIENT/OBSERVATION GOALS TO BE MET BEFORE DISCHARGE:  1. Pain Status: No Pain    2. Return to near baseline physical activity: Yes    3. Cleared for discharge by consultants (if involved): No    Discharge Planner Nurse   Safe discharge environment identified: Yes  Barriers to discharge: Yes, Nephrology Consult       Entered by: Stewart Villarreal 2018 4:25 AM     Please review provider order for any additional goals.   Nurse to notify provider when observation goals have been met and patient is ready for discharge.    VSS. Denies SOB/CP/Pain. A/Ox4. SBA. Positive for RSV. Droplet/Contact Isolation Initiated. B, after eating boxed meal. Saline Locked. LS: Diminished w/ expiratory wheezes.. Edema +1 BLE.

## 2018-01-20 NOTE — CONSULTS
[facility]    Nephrology Consultation     Date of Admission:  1/19/2018    Assessment & Plan      Donald Raza is a 67 year old male who was admitted on 1/19/2018.     1) Cough, Fever, SOB:  Due to RSV.    2)  ESRD:  He has HD at [facility] under Dr. Chavez.  TTS.  L AVF.  3.5 H.  .     3) HIV on HAART    4) Anemia:  HGB 8.1.  He is on high dose EPO.    Plan:    HD today  Home tomorrow if stable.      Rudolph Wilkes MD  [facility] - Nephrology  [phone]    Reason for Consult      I was asked to see the patient for ESRD.    Primary Care Physician      Aida Thomas    Chief Complaint      Cough, SOB    History is obtained from the patient and chart review.      History of Present Illness      Donald Raza is a 67 year old male who presents with cough, SOB and low grade fever for a few days.  Seen at Urgent Care and sent in for admission.    Nasal swab shows RSV.    He feels better today.      Past Medical History   I have reviewed this patient's medical history and updated it with pertinent information if needed.   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, recurrent     Bradycardia 5/28/2016     CAD S/P percutaneous coronary angioplasty 6/15/2015     Chest pain      CKD (chronic kidney disease)     Hemodialysis     Dilated aortic root (H) 5/6/2016     ESRD (end stage renal disease) on dialysis (H) 5/6/2016     Hemodialysis-associated hypotension 5/22/2016     Human immunodeficiency virus (HIV) disease      Hypertension 2010     Hypotension, unspecified hypotension type 5/22/2016     Impotence of organic origin      Increased prostate specific antigen (PSA) velocity 08/08/2016    Awaiting bx on blood thinner     Mixed hyperlipidemia      Near syncope 2016    with hemodialysis     NSTEMI (non-ST elevated  myocardial infarction) (H) 12/2015, 5/2016     Pulmonary HTN     Mod     TIA (transient ischemic attack) 5/2016     Type 2 diabetes mellitus (H) age 50     Unstable angina (H) 3/4/2016       Past Surgical History   I have reviewed this patient's surgical history and updated it with pertinent information if needed.  Past Surgical History:   Procedure Laterality Date     ANGIOGRAM  03-04-16    No culprit lesions, stents widely patent      ANGIOGRAM  05-06-16    Cutting balloon ptca=Diag     APPENDECTOMY  2000     CHOLECYSTECTOMY, LAPOROSCOPIC       COLOSTOMY  09/30/1999    Temporary for diverticulitis     HEART CATH, ANGIOPLASTY  08-18-16    LAD PCI. Stented with a 3.0 x 8 mm Xience Alpine stent.     STENT, CORONARY, S660 15/18  12/2015    VANITA=Diag, PTCA=LAD     STENT, CORONARY, S660 15/18  06/2015    VANITA=LAD       Prior to Admission Medications   Prior to Admission Medications   Prescriptions Last Dose Informant Patient Reported? Taking?   AMLODIPINE BESYLATE PO 1/19/2018 at AM  Yes Yes   Sig: Take 5 mg by mouth daily    B COMPLEX-C-FOLIC ACID PO 1/18/2018 at HS Self Yes Yes   Sig: Take 1 tablet by mouth At Bedtime    CLONAZEPAM PO 1/18/2018 at Unknown time Self Yes Yes   Sig: Take 0.5 mg by mouth nightly as needed for anxiety (restless legs)   CLOPIDOGREL BISULFATE PO 1/18/2018 at HS Self Yes Yes   Sig: Take 75 mg by mouth every evening    DOXERCALCIFEROL IV 1/18/2018 at Unknown time Self Yes Yes   Sig: Inject 6 mcg into the vein three times a week (with dialysis)   Darunavir Ethanolate (PREZISTA PO) 1/18/2018 at HS Self Yes Yes   Sig: Take 800 mg by mouth At Bedtime.   HYDRALAZINE HCL PO 1/19/2018 at PM  Yes Yes   Sig: Take 25 mg by mouth 2 times daily   Insulin Lispro (HUMALOG PEN SC) 1/19/2018 at PM  Yes Yes   Sig: Take 15 units TID and an additional 2 units if BG is over 150   Isosorbide Mononitrate  MG TB24 1/18/2018 at HS  No Yes   Sig: Take 1 tablet (120 mg) by mouth every evening   MAGNESIUM OXIDE PO  2018 at HS Self Yes Yes   Sig: Take 400 mg by mouth At Bedtime   Nutritional Supplements (NEPRO PO) 2018 at PM Self Yes Yes   Si-3 times daily   abacavir (ZIAGEN) 300 MG tablet 2018 at HS Self Yes Yes   Sig: Take 600 mg by mouth every evening    albuterol (PROAIR HFA/PROVENTIL HFA/VENTOLIN HFA) 108 (90 BASE) MCG/ACT Inhaler 2018 at Unknown time Self No Yes   Sig: Inhale 2 puffs into the lungs every 6 hours as needed for shortness of breath / dyspnea or wheezing   atorvastatin (LIPITOR) 40 MG tablet 2018 at HS Self Yes Yes   Sig: Take 40 mg by mouth At Bedtime   calcium carbonate (TUMS) 500 MG chewable tablet 2018 at PM  Yes Yes   Sig: Take 3 chew tab by mouth 4 times daily    chlorhexidine (CHLORHEXIDINE) 0.12 % solution 2018 at PM Self Yes Yes   Sig: Rinse and gargle 15 ml by mouth twice a day as directed.   dapsone 100 MG tablet 2018 at HS Self Yes Yes   Sig: Take 100 mg by mouth At Bedtime    dolutegravir (TIVICAY) 50 MG tablet 2018 at HS Self Yes Yes   Sig: Take 50 mg by mouth At Bedtime   epoetin brittni (EPOGEN,PROCRIT) 63373 UNIT/ML injection 2018 at Unknown time Self Yes Yes   Sig: Inject 11,000 Units Subcutaneous three times a week WITH DIALYSIS   gabapentin (NEURONTIN) 300 MG capsule 2018 at AM Self Yes Yes   Sig: Take 300 mg by mouth every morning    gabapentin (NEURONTIN) 300 MG capsule 2018 at HS Self Yes Yes   Sig: Take 600 mg by mouth every evening   imiquimod (ALDARA) 5 % cream Past Month at Unknown time Self Yes Yes   Sig: Apply topically as needed   insulin glargine (LANTUS) 100 UNIT/ML injection 2018 at noon  Yes Yes   Sig: Inject 50 Units Subcutaneous daily Takes about noon   iron sucrose (VENOFER) 20 MG/ML injection Past Week at Unknown time Self Yes Yes   Sig: Inject 50 mg into the vein once a week WIth dialysis   losartan (COZAAR) 50 MG tablet 2018 at HS  No Yes   Sig: Take 1 tablet (50 mg) by mouth every evening  "  mycophenolate (GENERIC EQUIVALENT) 500 MG tablet 1/19/2018 at Noon  No Yes   Sig: Take 1 tablet (500 mg) by mouth 2 times daily On an empty stomach   nitroglycerin (NITROSTAT) 0.4 MG SL tablet More than a month at Unknown time Self No No   Sig: One tablet under the tongue every 5 minutes if needed for chest pain. May repeat every 5 minutes for a maximum of 3 doses in 15 minutes\"   omega-3 acid ethyl esters (LOVAZA) 1 G capsule 1/19/2018 at PM  Yes Yes   Sig: Take 2 g by mouth 2 times daily   omeprazole (PRILOSEC) 40 MG capsule 1/19/2018 at PM Self Yes Yes   Sig: Take 40 mg by mouth daily 1 hour before dinner   order for DME DME at DME Self No No   Sig: Equipment being ordered: Other: 4WW  Treatment Diagnosis: Decreased activity tolerance   ritonavir (NORVIR) 100 MG capsule 1/18/2018 at HS Self Yes Yes   Sig: Take 1 capsule by mouth At Bedtime    terbinafine (LAMISIL AT) 1 % cream More than a month at Unknown time Self Yes No   Sig: Apply topically 2 times daily as needed       Facility-Administered Medications: None     Allergies   Allergies   Allergen Reactions     Calcium Acetate Other (See Comments)     Other reaction(s): Other, see comments  Pain in chest and back  Pain in chest area (sensitive skin)      Diagnostic X-Ray Materials Other (See Comments)     PN: renal failure     Lisinopril      Sulfa Drugs        Social History   I have reviewed this patient's social history and updated it with pertinent information if needed. Donald Guevaraido  reports that he quit smoking about 15 years ago. His smoking use included Cigarettes. He has never used smokeless tobacco. He reports that he does not drink alcohol or use illicit drugs.    Family History   I have reviewed this patient's family history and updated it with pertinent information if needed.   Family History   Problem Relation Age of Onset     HEART DISEASE Brother 40     CABG     KIDNEY DISEASE Sister      Hypertension Sister      HEART DISEASE Brother      " Dilated aorta       Review of Systems   The 10 point Review of Systems is negative other than noted in the HPI.      Physical Exam   Temp: 98  F (36.7  C) Temp src: Oral BP: 159/85 Pulse: 82   Resp: 18 SpO2: 95 % O2 Device: None (Room air)    Vital Signs with Ranges  Temp:  [97.9  F (36.6  C)-99.1  F (37.3  C)] 98  F (36.7  C)  Pulse:  [67-85] 82  Resp:  [16-20] 18  BP: (144-171)/(70-85) 159/85  SpO2:  [92 %-98 %] 95 %  188 lbs 0 oz    GENERAL: healthy, alert, NAD  HEENT:  Normocephalic. No gross abnormalities.  Pupils equal.  MMM.  Dentition is ok.  CV: RRR, no murmurs, no clicks, gallops, or rubs, no edema, no carotid bruits  RESP: expiratory wheezes  GI: Abdomen soft/nt/nd, BS normal. No masses, organomegaly  MUSCULOSKELETAL: extremities nl - no gross deformities noted  SKIN: no suspicious lesions or rashes, dry to touch  NEURO:  Strength normal and symmetric.   PSYCH: mood good, affect appropriate  LYMPH: No palpable ant/post cervical and supraclavicular adenopathy    Data   BMP  Recent Labs  Lab 01/20/18  0729   *   POTASSIUM 4.7   CHLORIDE 96   PRABHA 9.2   CO2 25   BUN 61*   CR 9.10*   *     Phos@[email or web address](phos:4)  CBC)  Recent Labs  Lab 01/20/18  0729   WBC 3.4*   HGB 8.1*   HCT 25.5*   MCV 91   *     No lab results found in last 7 days.    Invalid input(s): BILIRUBININDIRECT  No lab results found in last 7 days.  No results found for: D2VIT, D3VIT, DTOT    Recent Labs  Lab 01/20/18  0729   HGB 8.1*   HCT 25.5*   MCV 91     No results for input(s): PTHI in the last 168 hours.

## 2018-01-20 NOTE — H&P
CHIEF COMPLAINT:  Cough, shortness of breath.      HISTORY OF PRESENT ILLNESS:  Mr. Donald Raza is a very pleasant 67-year-old male with a history of HIV/AIDS, end-stage renal disease on dialysis 3 times a week, asthma, coronary artery disease with previous stent placement, who presents to the hospital today for concerns about cough.  He reports developing low-grade fever and chills the past 24-48 hours.  He had dialysis yesterday and did not feel very well during dialysis.  He says he had an uneventful run otherwise, which was completed.  Today, he continued to feel unwell with cough and shortness of breath.  He went to [facility] this evening.  During his visit at urgent care, his vital signs included a temperature of 37.4 degrees, heart rate of 70, respiratory rate of 16, blood pressure 150/70 and saturations 96% on room air.  He had lab workup done including a mildly elevated lactic acid at 2.1.  Basic metabolic panel was done, notable for creatinine of 7.6, glucose of 320, potassium 4.2 and sodium 136.  BUN was 40.  Calcium was 10.2.  Anion gap was 12.  White blood cell count was 4 and hemoglobin was 8.1.  Platelet count was 113.  Apparently, a chest x-ray was performed, but I do not have results of this.  The provider at Urgent Care contacted one of my colleagues, who agreed to admit the patient to the hospital.      The patient reports he is taking his medications as prescribed.  He does not know his last CD4 count, but on chart review, it appears to be 150 in 09/2017, this is the last one that I have seen in Care Everywhere.  He denies any history of opportunistic or significant infections related to his HIV.      PAST MEDICAL HISTORY:   1.  Huang disease.   2.  History of colon cancer, status post hemicolectomy in 2004.   3.  HIV/AIDS.  Most recent CD4 count that I saw was 150 in 09/2017.   4.  Diabetes mellitus type 2 with use of insulin.   5.  Diabetic neuropathy.   6.  Coronary artery  disease with previous stent placement.  He reports he has had 8 stents placed in his heart.   7.  History of myocardial infarction.   8.  End-stage renal disease, on dialysis 3 times a week.   9.  Anemia of chronic disease related to chronic kidney disease.   10.  Hyperlipidemia.   11.  Hypertension.   12.  Laparoscopic cholecystectomy.   13.  Appendectomy.   14.  History of pulmonary hypertension.   15.  History of transient ischemic attack.      CURRENT MEDICATIONS:  Await pharmacy reconciliation of medication list.  Medication list appears to include the followin.  Abacavir.   2.  Albuterol.   3.  Amlodipine.   4.  Atorvastatin.   5.  Nephrocaps.   6.  Tums.   7.  Chlorhexidine.   8.  Klonopin.   9.  Clopidogrel.   10.  Dapsone.   11.  Prezista.     12.  Tivicay.    13.  Doxercalciferol.   14.  Erythropoietin.   15.  Gabapentin.   16.  Hydralazine.   17.  Aldara.   18.  Glargine insulin.   19.  Humalog insulin.   20.  Iron sucrose.   21.  Imdur.   22.  Losartan.   23.  Magnesium oxide.   24.  Mycophenolate.   25.  Nitroglycerin.   26.  Omega-3 fatty acid.   28.  Omeprazole.   28.  Ritonavir.   29.  Terbinafine cream.      ALLERGIES:   1.  CALCIUM ACETATE.   2.  LISINOPRIL.   3.  SULFA.      FAMILY HISTORY:  Reviewed.  Noncontributory to this admission.      SOCIAL HISTORY:  The patient denies any alcohol or smoking.      REVIEW OF SYSTEMS:  Please see HPI for details.  Comprehensive greater than 10-point review of systems otherwise negative besides that detailed above.      PHYSICAL EXAMINATION:   VITAL SIGNS:  Blood pressure is currently 170/75 with heart rate of 65, afebrile, saturation 98% on room air.   GENERAL:  The patient appears nontoxic and in no acute distress.  He appears awake, alert and oriented x 3.  He is a very pleasant gentleman, laughing and joking with this examiner.  He does not appear to be in any significant distress.   HEENT:  Head is atraumatic.  Sclerae are white.  Eyelids are  normal.  Conjunctivae are normal.  Extraocular movements are intact.   NECK:  Supple.  No cervical or supraclavicular lymphadenopathy.   HEART:  Regular rate and rhythm.  No significant murmurs.  No lower extremity edema.   LUNGS:  Notable for scattered rhonchi with expiratory wheezes bilaterally.  No intercostal retractions.  No conversational dyspnea.   ABDOMEN:  Nontender, nondistended, soft.  No masses.  No organomegaly.   EXTREMITIES:  Show no edema.   SKIN:  Exam reveals no rash.  No jaundice.  Skin is dry to the touch.   NEUROLOGIC:  Cranial nerves II through XII are intact.  He moves all extremities appropriately.  Sensation is intact to light touch in the upper and lower extremities bilaterally.   PSYCHIATRIC:  The patient awake, alert and oriented x 3.  Mood and affect are normal and appropriate.      LABORATORY AND IMAGING DATA:  Reviewed above in HPI.      IMPRESSION AND PLAN:  Mr. Donald Raza is a 67-year-old male with a history of HIV/AIDS, end-stage renal disease on dialysis, coronary artery disease, asthma, who presents to the hospital today for concerns about a productive cough and low-grade fevers and chills for the past 24-48 hours.  He was seen at [facility] and requested to be admitted here to [facility] for his symptoms.   1.  Cough:  We will check chest x-ray for completeness.  I do not have a report from Urgent Care.  If chest x-ray is clear, would hold off on antibiotic therapy.  I suspect this may be a viral illness, likely viral bronchitis.  It appears based on his medication list that he is on dapsone for PCP prophylaxis.  SULFA ALLERGY NOTED.  If infiltrate present, would consider treatment for community-acquired pneumonia.   2.  Acute asthma exacerbation:  Secondary to above.  We will treat with prednisone 40 mg daily, along with nebulizers as needed.  Chest x-ray as above.  We will also check influenza titer.   3.  End-stage renal disease on dialysis.  Last  dialyzed yesterday.  Due for dialysis again tomorrow.  Will place a Nephrology consultation to assist with this.   4.  HIV/AIDS history:  Most recent CD4 count is 150 in September.  I have ordered a CD4 count to be drawn here, hopefully will be back by tomorrow.  It appears he is taking dapsone for PCP prophylaxis given a SULFA ALLERGY.  Would consider curbsiding Infection Disease tomorrow just to review the case with them to make sure that there are not any other recommendations they would have.  Resume his usual medications when clarified by pharmacy.      The patient is being admitted to observation status.  Anticipate discharge within 24-48 hours, assuming no significant findings on chest x-ray and he does well with the plan above.         AMY VERMA MD             D: 2018 20:28   T: 2018 21:32   MT: JAIRON      Name:     GREGORIO BRUSH   MRN:      [id number]        Account:      [id number]   :      1950           Admitted:     [id number]      Document: [id number]

## 2018-01-20 NOTE — PROVIDER NOTIFICATION
"Provider notified, \"Just FYI per [facility], group or lab that runs T-Cell Subset Profile test only works Monday to Friday. \"  "

## 2018-01-20 NOTE — CONSULTS
"Care Transition Initial Assessment - RN    Reason For Consult: care coordination/care conference, discharge planning   Met with: Patient.    DATA   Active Problems:    Bronchitis       Cognitive Status: alert, oriented   Primary Care Clinic [facility]: [facility]  Primary Care MD Name: Dr. Thomas  Contact information and PCP information verified: Yes    Lives With: spouse, child(jean), adult  Living Arrangements: house  Quality Of Family Relationships: supportive, involved, helpful  Description of Support System: Supportive, Involved   Who is your support system?: Wife, Children         Insurance concerns: No Insurance issues identified    ASSESSMENT  Patient currently receives the following services:  DaughterSuzi is PCA        Identified issues/concerns regarding health management: MUSHTAQ Elevated/Readmission.  Last admit was 12/25-12/28.  Pt is well known to Care Coordination.  Pt had 7 IP admissions last year.  Pt tells me when he starts to feel bad he comes in \"Its cheaper for me to come in early and stay a few days, then wait get really sick and I\"m in hosp for weeks\".     Met with patient at bedside.  He is alert and oriented. Verified he lives at home with his wife/daughter/brother.  DaughterSuzi provides PCA care.  Hx of ESRD has HD at [facility] on Tues/THurs/Sat schedule.  Uses a cane with mobility.  Reports no recent falls.  Transport needs provided by family.  Pt has good family support.  Does not anticipate any needs at PA.     PLAN  Patient anticipates discharging to home with family support .     Patient anticipates needs for home equipment: No     Appointments: wife to schedule f/u appointments    Care  (CTS) will continue to follow as needed.    Zoe Fish, RN,BSN, CTS  [facility]  Care Coordination  [phone]    "

## 2018-01-20 NOTE — PHARMACY-ADMISSION MEDICATION HISTORY
Admission medication history interview status for this patient is complete. See [facility] admission navigator for allergy information, prior to admission medications and immunization status.     Medication history interview source(s):Patient and med list  Medication history resources (including written lists, pill bottles, clinic record):None  Primary pharmacy:[facility] [address]    Changes made to PTA medication list:  Added: Lovaza  Deleted: NA  Changed: amlodipine    Actions taken by pharmacist (provider contacted, etc):None     Additional medication history information:  -Pt gets HD on Tues, Thur, Sat. Next HD due on 1/20/18    Medication reconciliation/reorder completed by provider prior to medication history? No    Do you take OTC medications (eg tylenol, ibuprofen, fish oil, eye/ear drops, etc)? Y(Y/N)    For patients on insulin therapy: Y (Y/N)  Lantus/levemir/NPH/Mix 70/30 dose:   (Y) (see Med list for doses)   Sliding scale Novolog Y  If Yes, do you have a baseline novolog pre-meal dose: 15 units with meals  Patients eat three meals a day:   some times  How many episodes of hypoglycemia do you have per week: __NA__  How many missed doses do you have per week: __NA__  How many times do you check your blood glucose per day: __NA_   Any Barriers to therapy - Be specific :  cost of medications, comfortable with giving injections (if applicable), comfortable and confident with current diabetes regimen: Y/N ______NA____      Prior to Admission medications    Medication Sig Last Dose Taking? Auth Provider   omega-3 acid ethyl esters (LOVAZA) 1 G capsule Take 2 g by mouth 2 times daily 1/19/2018 at PM Yes Unknown, Entered By History   losartan (COZAAR) 50 MG tablet Take 1 tablet (50 mg) by mouth every evening 1/18/2018 at HS Yes Yuki Hinojosa APRN CNP   Isosorbide Mononitrate  MG TB24 Take 1 tablet (120 mg) by mouth every evening 1/18/2018 at HS Yes Yuki Hinojosa APRN CNP   insulin glargine (LANTUS) 100 UNIT/ML  injection Inject 50 Units Subcutaneous daily Takes about noon 1/19/2018 at noon Yes Unknown, Entered By History   HYDRALAZINE HCL PO Take 25 mg by mouth 2 times daily 1/19/2018 at PM Yes Unknown, Entered By History   mycophenolate (GENERIC EQUIVALENT) 500 MG tablet Take 1 tablet (500 mg) by mouth 2 times daily On an empty stomach 1/19/2018 at Noon Yes Sherice Bernardo MD   AMLODIPINE BESYLATE PO Take 5 mg by mouth daily  1/19/2018 at AM Yes Unknown, Entered By History   Insulin Lispro (HUMALOG PEN SC) Take 15 units TID and an additional 2 units if BG is over 150 1/19/2018 at PM Yes Unknown, Entered By History   calcium carbonate (TUMS) 500 MG chewable tablet Take 3 chew tab by mouth 4 times daily  1/19/2018 at PM Yes Unknown, Entered By History   B COMPLEX-C-FOLIC ACID PO Take 1 tablet by mouth At Bedtime  1/18/2018 at HS Yes Reported, Patient   albuterol (PROAIR HFA/PROVENTIL HFA/VENTOLIN HFA) 108 (90 BASE) MCG/ACT Inhaler Inhale 2 puffs into the lungs every 6 hours as needed for shortness of breath / dyspnea or wheezing 1/18/2018 at Unknown time Yes Nelson Worthy MD   Nutritional Supplements (NEPRO PO) 1-3 times daily 1/19/2018 at PM Yes Unknown, Entered By History   omeprazole (PRILOSEC) 40 MG capsule Take 40 mg by mouth daily 1 hour before dinner 1/19/2018 at PM Yes Unknown, Entered By History   gabapentin (NEURONTIN) 300 MG capsule Take 600 mg by mouth every evening 1/18/2018 at HS Yes Unknown, Entered By History   imiquimod (ALDARA) 5 % cream Apply topically as needed Past Month at Unknown time Yes Reported, Patient   DOXERCALCIFEROL IV Inject 6 mcg into the vein three times a week (with dialysis) 1/18/2018 at Unknown time Yes Reported, Patient   CLONAZEPAM PO Take 0.5 mg by mouth nightly as needed for anxiety (restless legs) 1/18/2018 at Unknown time Yes Unknown, Entered By History   CLOPIDOGREL BISULFATE PO Take 75 mg by mouth every evening  1/18/2018 at HS Yes Unknown, Entered By History  "  abacavir (ZIAGEN) 300 MG tablet Take 600 mg by mouth every evening  1/18/2018 at HS Yes Abstract, Provider   chlorhexidine (CHLORHEXIDINE) 0.12 % solution Rinse and gargle 15 ml by mouth twice a day as directed. 1/19/2018 at PM Yes Abstract, Provider   atorvastatin (LIPITOR) 40 MG tablet Take 40 mg by mouth At Bedtime 1/18/2018 at HS Yes Unknown, Entered By History   epoetin brittni (EPOGEN,PROCRIT) 47624 UNIT/ML injection Inject 11,000 Units Subcutaneous three times a week WITH DIALYSIS 1/18/2018 at Unknown time Yes Unknown, Entered By History   iron sucrose (VENOFER) 20 MG/ML injection Inject 50 mg into the vein once a week WIth dialysis Past Week at Unknown time Yes Unknown, Entered By History   MAGNESIUM OXIDE PO Take 400 mg by mouth At Bedtime 1/18/2018 at HS Yes Unknown, Entered By History   dolutegravir (TIVICAY) 50 MG tablet Take 50 mg by mouth At Bedtime 1/18/2018 at HS Yes Unknown, Entered By History   gabapentin (NEURONTIN) 300 MG capsule Take 300 mg by mouth every morning  1/19/2018 at AM Yes Unknown, Entered By History   dapsone 100 MG tablet Take 100 mg by mouth At Bedtime  1/18/2018 at HS Yes Reported, Patient   Darunavir Ethanolate (PREZISTA PO) Take 800 mg by mouth At Bedtime. 1/18/2018 at HS Yes Reported, Patient   ritonavir (NORVIR) 100 MG capsule Take 1 capsule by mouth At Bedtime  1/18/2018 at HS Yes Reported, Patient   order for DME Equipment being ordered: Other: 4WW  Treatment Diagnosis: Decreased activity tolerance DME at DME  Lorna Jhaveri MD   terbinafine (LAMISIL AT) 1 % cream Apply topically 2 times daily as needed  More than a month at Unknown time  Abstract, Provider   nitroglycerin (NITROSTAT) 0.4 MG SL tablet One tablet under the tongue every 5 minutes if needed for chest pain. May repeat every 5 minutes for a maximum of 3 doses in 15 minutes\" More than a month at Unknown time  Lay Paz MD           "

## 2018-01-20 NOTE — PROGRESS NOTES
SPIRITUAL HEALTH SERVICES  SPIRITUAL ASSESSMENT Progress Note  [facility] MS3    Attempted to visit pt two times per request on admission.  On each attempt, Donald was absent from his room.    Will notify the on call  for 1/21 of the unfulfilled request.    Adam Marr M.Div.  Staff   Pager [phone]

## 2018-01-21 VITALS
RESPIRATION RATE: 18 BRPM | WEIGHT: 188 LBS | BODY MASS INDEX: 26.32 KG/M2 | DIASTOLIC BLOOD PRESSURE: 57 MMHG | OXYGEN SATURATION: 94 % | SYSTOLIC BLOOD PRESSURE: 140 MMHG | HEART RATE: 78 BPM | TEMPERATURE: 96.8 F | HEIGHT: 71 IN

## 2018-01-21 LAB
GLUCOSE BLDC GLUCOMTR-MCNC: 181 MG/DL (ref 70–99)
GLUCOSE BLDC GLUCOMTR-MCNC: 213 MG/DL (ref 70–99)
GLUCOSE BLDC GLUCOMTR-MCNC: 255 MG/DL (ref 70–99)
HBV SURFACE AB SERPL IA-ACNC: 36.5 M[IU]/ML
HBV SURFACE AG SERPL QL IA: NONREACTIVE

## 2018-01-21 PROCEDURE — 25000131 ZZH RX MED GY IP 250 OP 636 PS 637: Mod: GY | Performed by: INTERNAL MEDICINE

## 2018-01-21 PROCEDURE — 25000125 ZZHC RX 250: Performed by: INTERNAL MEDICINE

## 2018-01-21 PROCEDURE — 25000132 ZZH RX MED GY IP 250 OP 250 PS 637: Mod: GY | Performed by: INTERNAL MEDICINE

## 2018-01-21 PROCEDURE — A9270 NON-COVERED ITEM OR SERVICE: HCPCS | Mod: GY | Performed by: INTERNAL MEDICINE

## 2018-01-21 PROCEDURE — 00000146 ZZHCL STATISTIC GLUCOSE BY METER IP

## 2018-01-21 PROCEDURE — G0378 HOSPITAL OBSERVATION PER HR: HCPCS

## 2018-01-21 PROCEDURE — 99217 ZZC OBSERVATION CARE DISCHARGE: CPT | Performed by: INTERNAL MEDICINE

## 2018-01-21 RX ORDER — BENZONATATE 100 MG/1
100 CAPSULE ORAL 3 TIMES DAILY PRN
Qty: 42 CAPSULE | Refills: 0 | Status: ON HOLD | OUTPATIENT
Start: 2018-01-21 | End: 2018-07-12

## 2018-01-21 RX ORDER — PREDNISONE 20 MG/1
40 TABLET ORAL DAILY
Qty: 8 TABLET | Refills: 0 | Status: SHIPPED | OUTPATIENT
Start: 2018-01-22 | End: 2018-01-26

## 2018-01-21 RX ADMIN — HYDRALAZINE HYDROCHLORIDE 25 MG: 25 TABLET ORAL at 08:56

## 2018-01-21 RX ADMIN — CALCIUM CARBONATE (ANTACID) CHEW TAB 500 MG 1500 MG: 500 CHEW TAB at 13:04

## 2018-01-21 RX ADMIN — GABAPENTIN 300 MG: 300 CAPSULE ORAL at 08:56

## 2018-01-21 RX ADMIN — PREDNISONE 40 MG: 20 TABLET ORAL at 08:56

## 2018-01-21 RX ADMIN — CALCIUM CARBONATE (ANTACID) CHEW TAB 500 MG 1500 MG: 500 CHEW TAB at 08:56

## 2018-01-21 RX ADMIN — AMLODIPINE BESYLATE 5 MG: 5 TABLET ORAL at 08:56

## 2018-01-21 RX ADMIN — MYCOPHENOLATE MOFETIL 500 MG: 250 CAPSULE ORAL at 08:56

## 2018-01-21 RX ADMIN — INSULIN GLARGINE 50 UNITS: 100 INJECTION, SOLUTION SUBCUTANEOUS at 08:52

## 2018-01-21 NOTE — PLAN OF CARE
"Problem: Breathing Pattern Ineffective (Adult)  Goal: Effective Oxygenation/Ventilation  Patient will demonstrate the desired outcomes by discharge/transition of care.   Problem: Patient Care Overview  Goal: Discharge Needs Assessment  Outcome: Improving  PRIMARY DIAGNOSIS: \"GENERIC\" NURSING  OUTPATIENT/OBSERVATION GOALS TO BE MET BEFORE DISCHARGE:  1. ADLs back to baseline: YES     2. Activity and level of assistance: Up with standby assistance.     3. Pain status: Pain free     Return to near baseline physical activity: Yes    Discharge Planner Nurse   Safe discharge environment identified: Yes  Barriers to discharge: No      Pt D/C to home with spouse today. Pt verbalizes understanding of D/C paperwork and medication regimen. Prescriptions, and D/C paper and belongings sent with pt.       "

## 2018-01-21 NOTE — DISCHARGE SUMMARY
[facility]  Discharge Summary  Name: Donald Raza    MRN: [id number]  YOB: 1950    Age: 67 year old  Date of Discharge:  1/21/2018  Date of Admission: 1/19/2018  Primary Care Provider: Aida Thomas  Discharge Physician:  Ezra Fortune MD  Discharging Service:  Hospitalist      Discharge Diagnoses:  RSV bronchiolitis  Acute asthma exacerbation  ESRD on HD  IDDM type II  AIDS  CAD  Anemia     Hospital Course:  Summary of Stay:   Mr. Donald Raza is a 67-year-old male with a history of HIV/AIDS, end-stage renal disease on dialysis, coronary artery disease, and asthma.  He presented to the ED on 1/19/18 for evaluation of productive cough, low-grade fevers, and chills for 24-48 hours prior to presentation.  He was seen at [facility] and requested to be admitted here to [facility] for his symptoms.  Evaluation did not show pneumonia.  Testing was positive for RSV.  Symptoms improved with prednisone po for asthma exacerbation.     Problem list:     RSV bronchiolitis and acute asthma exacerbation:   presented with cough, sob, and low grade temps.  Chest xray without infiltrate.  RSV positive on PCR, influenza negative.  Very wheezy initially.  Prednisone 40mg for 7 days.  Prn tessalon pearls.    ESRD on HD:  He dialyzed late in the afternoon 1/20.  Resume his routine HD on T/Th/S    AIDS:  Most recent CD4 count is 150 in September.   CD4 count was drawn here.  He is taking dapsone for PCP prophylaxis given a SULFA ALLERGY.    CAD    Anemia:  High dose epogen.  Needed multiple transfusions recently.     Discharge Disposition:  Discharged to home     Allergies:  Allergies   Allergen Reactions     Calcium Acetate Other (See Comments)     Other reaction(s): Other, see comments  Pain in chest and back  Pain in chest area (sensitive skin)      Diagnostic X-Ray Materials Other (See Comments)     PN: renal failure     Lisinopril      Sulfa Drugs         Discharge  Medications:   Current Discharge Medication List      START taking these medications    Details   predniSONE (DELTASONE) 20 MG tablet Take 2 tablets (40 mg) by mouth daily for 4 days  Qty: 8 tablet, Refills: 0    Associated Diagnoses: Mild asthma with exacerbation, unspecified whether persistent      benzonatate (TESSALON) 100 MG capsule Take 1 capsule (100 mg) by mouth 3 times daily as needed for cough  Qty: 42 capsule, Refills: 0    Associated Diagnoses: RSV bronchiolitis         CONTINUE these medications which have NOT CHANGED    Details   omega-3 acid ethyl esters (LOVAZA) 1 G capsule Take 2 g by mouth 2 times daily      losartan (COZAAR) 50 MG tablet Take 1 tablet (50 mg) by mouth every evening  Qty: 30 tablet, Refills: 3    Associated Diagnoses: Hypertension secondary to other renal disorders      Isosorbide Mononitrate  MG TB24 Take 1 tablet (120 mg) by mouth every evening  Qty: 30 tablet, Refills: 11    Associated Diagnoses: Coronary artery disease involving native heart, angina presence unspecified, unspecified vessel or lesion type; Hypertension secondary to other renal disorders      insulin glargine (LANTUS) 100 UNIT/ML injection Inject 50 Units Subcutaneous daily Takes about noon      HYDRALAZINE HCL PO Take 25 mg by mouth 2 times daily      mycophenolate (GENERIC EQUIVALENT) 500 MG tablet Take 1 tablet (500 mg) by mouth 2 times daily On an empty stomach    Comments: HOLD until seen by pcp/ renal within a week  Associated Diagnoses: ESRD (end stage renal disease) on dialysis (H)      AMLODIPINE BESYLATE PO Take 5 mg by mouth daily       Insulin Lispro (HUMALOG PEN SC) Take 15 units TID and an additional 2 units if BG is over 150      calcium carbonate (TUMS) 500 MG chewable tablet Take 3 chew tab by mouth 4 times daily       B COMPLEX-C-FOLIC ACID PO Take 1 tablet by mouth At Bedtime       albuterol (PROAIR HFA/PROVENTIL HFA/VENTOLIN HFA) 108 (90 BASE) MCG/ACT Inhaler Inhale 2 puffs into the  lungs every 6 hours as needed for shortness of breath / dyspnea or wheezing  Qty: 1 Inhaler, Refills: 1    Associated Diagnoses: Cough      Nutritional Supplements (NEPRO PO) 1-3 times daily      omeprazole (PRILOSEC) 40 MG capsule Take 40 mg by mouth daily 1 hour before dinner      !! gabapentin (NEURONTIN) 300 MG capsule Take 600 mg by mouth every evening      imiquimod (ALDARA) 5 % cream Apply topically as needed      DOXERCALCIFEROL IV Inject 6 mcg into the vein three times a week (with dialysis)      CLONAZEPAM PO Take 0.5 mg by mouth nightly as needed for anxiety (restless legs)      CLOPIDOGREL BISULFATE PO Take 75 mg by mouth every evening       abacavir (ZIAGEN) 300 MG tablet Take 600 mg by mouth every evening       chlorhexidine (CHLORHEXIDINE) 0.12 % solution Rinse and gargle 15 ml by mouth twice a day as directed.      atorvastatin (LIPITOR) 40 MG tablet Take 40 mg by mouth At Bedtime      epoetin brittni (EPOGEN,PROCRIT) 31770 UNIT/ML injection Inject 11,000 Units Subcutaneous three times a week WITH DIALYSIS      iron sucrose (VENOFER) 20 MG/ML injection Inject 50 mg into the vein once a week WIth dialysis      MAGNESIUM OXIDE PO Take 400 mg by mouth At Bedtime      dolutegravir (TIVICAY) 50 MG tablet Take 50 mg by mouth At Bedtime      !! gabapentin (NEURONTIN) 300 MG capsule Take 300 mg by mouth every morning       dapsone 100 MG tablet Take 100 mg by mouth At Bedtime       Darunavir Ethanolate (PREZISTA PO) Take 800 mg by mouth At Bedtime.      ritonavir (NORVIR) 100 MG capsule Take 1 capsule by mouth At Bedtime       order for DME Equipment being ordered: Other: 4WW  Treatment Diagnosis: Decreased activity tolerance  Qty: 1 each, Refills: 0    Associated Diagnoses: SOB (shortness of breath); Generalized muscle weakness      terbinafine (LAMISIL AT) 1 % cream Apply topically 2 times daily as needed       nitroglycerin (NITROSTAT) 0.4 MG SL tablet One tablet under the tongue every 5 minutes if needed  "for chest pain. May repeat every 5 minutes for a maximum of 3 doses in 15 minutes\"  Qty: 25 tablet, Refills: 3    Associated Diagnoses: Coronary artery disease due to lipid rich plaque; Status post coronary angioplasty       !! - Potential duplicate medications found. Please discuss with provider.           Condition on Discharge:  Discharge condition: Stable   Discharge vitals: Blood pressure 140/57, pulse 78, temperature 96.8  F (36  C), temperature source Oral, resp. rate 18, height 1.803 m (5' 11\"), weight 85.3 kg (188 lb), SpO2 94 %.   Code status on discharge: Full Code     History of Illness:  See detailed admission note for full details.    Physical Exam:  Blood pressure 140/57, pulse 78, temperature 96.8  F (36  C), temperature source Oral, resp. rate 18, height 1.803 m (5' 11\"), weight 85.3 kg (188 lb), SpO2 94 %.  Wt Readings from Last 1 Encounters:   01/19/18 85.3 kg (188 lb)     Constitutional: Awake, NAD  Eyes: sclera white   HEENT: atraumatic, MMM  Respiratory: no respiratory distress.  No crackles or wheeze.  Coughing now.  Cardiovascular: RRR.  No murmur   GI: non-tender, not distended, bowel sounds present  Skin: no rash or lesions, acyanotic  Musculoskeletal/extremities: atraumatic, no major deformities. Trace LE edema  Neurologic: A&O, speech clear, strength and light touch sensation grossly normal  Psychiatric: calm, cooperative, normal affect    Procedures other than Imaging:  Hemodialysis     Imaging:  Results for orders placed or performed during the hospital encounter of 01/19/18   XR Chest 2 Views    Narrative    CHEST TWO VIEWS   1/19/2018 8:40 PM     HISTORY: Cough. HIV history.      COMPARISON: 12/25/2017.    FINDINGS: Negative. Heart size normal. Lungs clear. The perihilar  opacities present on 12/25/2017 have cleared.      Impression    IMPRESSION: Negative.    JULIANA RAYMUNDO MD        Consultations:  Consultation during this admission received from nephrology.       Recent Lab " Results:    Recent Labs  Lab 01/20/18  0729   WBC 3.4*   HGB 8.1*   HCT 25.5*   MCV 91   *     No results for input(s): CULT in the last 168 hours.    Recent Labs  Lab 01/20/18  0729   *   POTASSIUM 4.7   CHLORIDE 96   CO2 25   ANIONGAP 10   *   BUN 61*   CR 9.10*   GFRESTIMATED 6*   GFRESTBLACK 7*   PRABHA 9.2     RSV PCR positive  Influenza A/B PCR negative     Pending Results:    Unresulted Labs Ordered in the Past 30 Days of this Admission     Date and Time Order Name Status Description    1/20/2018 1439 Hepatitis B surface antigen In process     1/20/2018 1439 Hepatitis B Surface Antibody In process          These results will be followed up by patient's primary care provider.    Discharge Instructions and Follow-Up:     Discharge Procedure Orders  Reason for your hospital stay   Order Comments: You were hospitalized for a presumed respiratory infection and you tested positive for RSV infection.  This is a self limited viral infection that should continue to improve over the next few days.  You also are prescribed a short course of prednisone for an asthma exacerbation.     Follow-up and recommended labs and tests    Order Comments: Follow up with primary care provider, Aida Thomas, as needed if symptoms persist > 1 week    Follow your routine dialysis sessions     Activity   Order Comments: Your activity upon discharge: activity as tolerated   Order Specific Question Answer Comments   Is discharge order? Yes      Full Code     Diet   Order Comments: Follow this diet upon discharge: Orders Placed This Encounter     Snacks/Supplements Adult: Nepro Oral Supplement; With Meals     Dialysis Diet   Order Specific Question Answer Comments   Is discharge order? Yes          Recommendations:  -prednisone 40mg daily for 4 more days  -routine HD    I, Ezra Fortune, personally saw the patient today and spent greater than 30 minutes discharging this patient.      Ezra Fortune MD

## 2018-01-21 NOTE — PLAN OF CARE
Problem: Patient Care Overview  Goal: Plan of Care/Patient Progress Review  Outcome: No Change  PRIMARY DIAGNOSIS: ACUTE PAIN  OUTPATIENT/OBSERVATION GOALS TO BE MET BEFORE DISCHARGE:  1. Pain Status: No Pain    2. Return to near baseline physical activity: Yes, SBA    3. Cleared for discharge by consultants (if involved): Yes, if remains stable    Discharge Planner Nurse   Safe discharge environment identified: Yes  Barriers to discharge: No       Entered by: Stewart Villarreal 2018 5:26 AM     Please review provider order for any additional goals.   Nurse to notify provider when observation goals have been met and patient is ready for discharge.    VSS. Denies SOB/CP/Pain. A/Ox4. Positive for RSV. Droplet Isolation/Contact Isolation maintained. LS: Diminished w/ fine crackles. Edema +1 BLE. B

## 2018-01-21 NOTE — PROGRESS NOTES
"[facility]  Hospitalist Progress Note  Patient Name: Donald Raza    MRN: [id number]  Provider: Jovi Hayes MD  01/20/18    Initial presenting complaint/issue to hospital (Diagnosis): cough and shortness of breath         Assessment and Plan:      Summary of Stay: Mr. Donald Raza is a 67-year-old male with a history of HIV/AIDS, end-stage renal disease on dialysis, coronary artery disease, and asthma.  He presented to the ED on 1/19/18 for evaluation of productive cough, low-grade fevers, and chills for 24-48 hours prior to presentation.  He was seen at [facility] and requested to be admitted here to [facility] for his symptoms.  Evaluation did not show pneumonia.  Testing was positive for RSV.  Patient has been treated supportively with some improvement.    Problem list:    1.  Cough and shortness of breath due to RSV and acute asthma exacerbation.  Continue supportive cares with prednisone, nebs, and oxygen if needed.  He does seem to be slowly improving, but does not feel comfortable discharging home this evening after dialysis.  2.    Testing for influenza was negative.  3.  End-stage renal disease on dialysis.    He dialyzed late in the afternoon.  His next regular dialysis days Tuesday.  4.  HIV/AIDS history:  Most recent CD4 count is 150 in September.   CD4 count was drawn here.  He is taking dapsone for PCP prophylaxis given a SULFA ALLERGY.    Full code  Ambulate for DVT prophylaxis  Disposition: To the observation care.        Interval History:      Patient is still having cough and some shortness of breath.  He is improving.  He is tolerating dialysis.  No new problems.                  Physical Exam:      Last Vital Signs:  /85  Pulse 87  Temp 98  F (36.7  C) (Oral)  Resp 18  Ht 1.803 m (5' 11\")  Wt 85.3 kg (188 lb)  SpO2 95%  BMI 26.22 kg/m2    Intake/Output Summary (Last 24 hours) at 01/20/18 1811  Last data filed at 01/20/18 1245   Gross per 24 hour "   Intake              750 ml   Output               80 ml   Net              670 ml       GENERAL: He appears somewhat uncomfortable with cough and shortness of breath.  PSYCH: pleasant, oriented, No acute distress.  EYES: PERRLA, Normal conjunctiva.  HEART:  Regular rate and rhythm. No JVD. Pulses normal. No edema.  LUNGS: Scattered wheezes bilaterally to auscultation, normal Respiratory effort.  ABDOMEN:  Soft, no hepatosplenomegaly, normal bowel sounds.  EXTREMETIES: No clubbing, cyanosis or ischemia  SKIN:  Dry to touch, No rash.           Medications:      All current medications were reviewed.         Data:      All new lab and imaging data was reviewed.   Labs:  No results for input(s): CULT in the last 168 hours.       Lab Results   Component Value Date     01/20/2018     12/28/2017     12/27/2017    Lab Results   Component Value Date    CHLORIDE 96 01/20/2018    CHLORIDE 95 12/28/2017    CHLORIDE 97 12/27/2017    Lab Results   Component Value Date    BUN 61 01/20/2018    BUN 59 12/28/2017    BUN 38 12/27/2017      Lab Results   Component Value Date    POTASSIUM 4.7 01/20/2018    POTASSIUM 4.5 12/28/2017    POTASSIUM 4.8 12/27/2017    Lab Results   Component Value Date    CO2 25 01/20/2018    CO2 25 12/28/2017    CO2 31 12/27/2017    Lab Results   Component Value Date    CR 9.10 01/20/2018    CR 7.70 12/28/2017    CR 5.63 12/27/2017          Recent Labs  Lab 01/20/18  0729   WBC 3.4*   HGB 8.1*   HCT 25.5*   MCV 91   *

## 2018-01-21 NOTE — PROGRESS NOTES
"SPIRITUAL HEALTH SERVICES Progress Note  [facility] Med. Surg 3    Visited Donald Raza per an admission.  Offered reflective conversation, emotional support, and prayer.      Illness Narrative - Donald reported that he is being treated for respiratory problems.      Distress - He stated that he had no concerns about his illness or other aspects of his life.      Coping - Donald reviewed that he has good family support, and he welcomed prayer.      Meaning-Making - He shared \"I am ready to die\" and reflected on his serenity: \"I accept whatever happens\" and \"I ask God to help me to accept.\"  Donald sees that his stable childhood in [place], centered around Religion and family, contributed to his resiliency.        Plan - Donald expects that he will likely discharge tomorrow.  Reviewed how he can request another  visit per his needs.    Jero Martinez M.Div., [facility]  Staff   Pager [phone]  .  "

## 2018-01-21 NOTE — PLAN OF CARE
"Problem: Patient Care Overview  Goal: Discharge Needs Assessment  Outcome: Improving  PRIMARY DIAGNOSIS: \"GENERIC\" NURSING  OUTPATIENT/OBSERVATION GOALS TO BE MET BEFORE DISCHARGE:  1. ADLs back to baseline: No    2. Activity and level of assistance: Up with standby assistance.    3. Pain status: Pain free.    4. Return to near baseline physical activity: No     Discharge Planner Nurse   Safe discharge environment identified: Yes  Barriers to discharge: No       Entered by: Cristopher Mendiola 01/20/2018 7:38 PM     Please review provider order for any additional goals.   Nurse to notify provider when observation goals have been met and patient is ready for discharge.      "

## 2018-01-21 NOTE — PROGRESS NOTES
Hand-off for Care Transitions to Next Level of Care Provider  Name: Donald Raza  : 1950  MRN #: [id number]  Reason for Hospitalization:  Cough, SOB.  Bronchitis  Admit Date/Time: 2018  7:20 PM  Discharge Date: 2018    Reason for Communication Hand-off Referral: Other  RSV, Asthma exacerbation    Discharge Plan:  Discharged to: Home with support                   Patient agreeable to post-hospital support suggestions:  Yes  Discharge Plan:             Patient is on new medications:   Yes   benzonatate 100 MG capsule. Take 1 capsule (100 mg) by mouth 3 times daily as  needed for cough   predniSONE 20 MG tablet. Start taking on: 2018  Take 2 tablets (40 mg) by  mouth daily for 4 days             [facility] follow up recommended: No           [employer] program:  Declined  Follow-up specialty is recommended: Yes. Follow up with Dr. Arnoldo Diaz at [facility].   Follow-up plan:  Future Appointments  Date Time Provider Department Center   2018 9:45 AM Arnoldo Diaz MD [facility]     Any outstanding tests or procedures:  No. Follow your usual dialysis sessions routine.       Key Recommendations:  He was seen at [facility] and requested to be admitted here to [facility] for his symptoms.  Evaluation did not show pneumonia.  Testing was positive for RSV.  Symptoms improved with prednisone po for asthma exacerbation. Please evaluate him for further improvement in symptoms.     Communicated handoff via EPIC Comm Mgt to Joe Thomas's CC at [phone].      Ruby Hinojosa RN, BSN, CTS  [facility]  [phone]

## 2018-01-21 NOTE — PLAN OF CARE
Problem: Patient Care Overview  Goal: Discharge Needs Assessment  Outcome: No Change  Pt at dialysis, back to room from dialysis about 1840. No c/o chest pain or difficulty breathing. VSS with elevated BP has scheduled BP medications will recheck BP after meds given.  and 231. Ordered large meal, good appetite. BP now 153/73, had Temp of 100.1oral Tylenol given.

## 2018-01-21 NOTE — PLAN OF CARE
"Problem: Breathing Pattern Ineffective (Adult)  Goal: Identify Related Risk Factors and Signs and Symptoms  Related risk factors and signs and symptoms are identified upon initiation of Human [employer] Clinical Practice Guideline (CPG).   Outcome: Adequate for Discharge Date Met: 01/21/18  Problem: Patient Care Overview  Goal: Discharge Needs Assessment  Outcome: Improving  PRIMARY DIAGNOSIS: \"GENERIC\" NURSING  OUTPATIENT/OBSERVATION GOALS TO BE MET BEFORE DISCHARGE:  1. ADLs back to baseline: YES     2. Activity and level of assistance: Up with standby assistance.     3. Pain status: Pain free     Return to near baseline physical activity: Yes    Discharge Planner Nurse   Safe discharge environment identified: Yes  Barriers to discharge: No      "

## 2018-01-21 NOTE — PLAN OF CARE
Problem: Patient Care Overview  Goal: Plan of Care/Patient Progress Review  Outcome: No Change  PRIMARY DIAGNOSIS: ACUTE PAIN  OUTPATIENT/OBSERVATION GOALS TO BE MET BEFORE DISCHARGE:  1. Pain Status: No Pain    2. Return to near baseline physical activity: Yes, SBA    3. Cleared for discharge by consultants (if involved): Yes, if remains stable    Discharge Planner Nurse   Safe discharge environment identified: Yes  Barriers to discharge: No       Entered by: Stewart Villarreal 01/21/2018 2:38 AM     Please review provider order for any additional goals.   Nurse to notify provider when observation goals have been met and patient is ready for discharge.    VSS. Denies SOB/CP/Pain. A/Ox4. Positive for RSV. Droplet Isolation/Contact Isolation maintained.

## 2018-01-29 ENCOUNTER — OFFICE VISIT (OUTPATIENT)
Dept: CARDIOLOGY | Facility: CLINIC | Age: 68
End: 2018-01-29
Attending: INTERNAL MEDICINE
Payer: MEDICARE

## 2018-01-29 VITALS
DIASTOLIC BLOOD PRESSURE: 72 MMHG | BODY MASS INDEX: 28.18 KG/M2 | HEART RATE: 73 BPM | SYSTOLIC BLOOD PRESSURE: 142 MMHG | WEIGHT: 201.3 LBS | HEIGHT: 71 IN

## 2018-01-29 DIAGNOSIS — I25.10 CORONARY ARTERY DISEASE INVOLVING NATIVE CORONARY ARTERY OF NATIVE HEART WITHOUT ANGINA PECTORIS: ICD-10-CM

## 2018-01-29 PROCEDURE — 99214 OFFICE O/P EST MOD 30 MIN: CPT | Performed by: INTERNAL MEDICINE

## 2018-01-29 NOTE — MR AVS SNAPSHOT
"              After Visit Summary   1/29/2018    Donald Raza    MRN: [id number]           Patient Information     Date Of Birth          1950        Visit Information        Provider Department      1/29/2018 9:45 AM Arnoldo Diaz MD [facility]        Today's Diagnoses     Coronary artery disease involving native coronary artery of native heart without angina pectoris           Follow-ups after your visit        Additional Services     Follow-Up with Cardiac Advanced Practice Provider                 Future tests that were ordered for you today     Open Future Orders        Priority Expected Expires Ordered    Follow-Up with Cardiac Advanced Practice Provider Routine 7/28/2018 1/29/2019 1/29/2018            Who to contact     If you have questions or need follow up information about today's clinic visit or your schedule please contact [facility] directly at [phone].  Normal or non-critical lab and imaging results will be communicated to you by Selligyhart, letter or phone within 4 business days after the clinic has received the results. If you do not hear from us within 7 days, please contact the clinic through Selligyhart or phone. If you have a critical or abnormal lab result, we will notify you by phone as soon as possible.  Submit refill requests through uromovie or call your pharmacy and they will forward the refill request to us. Please allow 3 business days for your refill to be completed.          Additional Information About Your Visit        MyChart Information     uromovie lets you send messages to your doctor, view your test results, renew your prescriptions, schedule appointments and more. To sign up, go to [email or web address] . Click on \"Log in\" on the left side of the screen, which will take you to the Welcome page. Then click on \"Sign up Now\" on the right side of the page.     You will be asked to enter the access " "code listed below, as well as some personal information. Please follow the directions to create your username and password.     Your access code is: [email or web address]  Expires: 2018  1:13 PM     Your access code will  in 90 days. If you need help or a new code, please call your [place] clinic or [phone].        Care EveryWhere ID     This is your Care EveryWhere ID. This could be used by other organizations to access your [place] medical records  [id number]        Your Vitals Were     Pulse Height BMI (Body Mass Index)             73 1.803 m (5' 11\") 28.08 kg/m2          Blood Pressure from Last 3 Encounters:   18 142/72   18 140/57   18 136/66    Weight from Last 3 Encounters:   18 91.3 kg (201 lb 4.8 oz)   18 85.3 kg (188 lb)   18 88.9 kg (196 lb)              We Performed the Following     Follow-Up with Cardiologist        Primary Care Provider Office Phone # Fax #    Aida Thomas [phone]       [facility] [address] [place] [address]        Equal Access to Services     JONO KERN : Hadii aad ku hadasho Soomaali, waaxda luqadaha, qaybta kaalmada adeegyada, waxay idiin haydav culver . So [facility] [phone].    ATENCIÓN: Si habla español, tiene a ayala disposición servicios gratuitos de asistencia lingüística. Llame al [phone].    We comply with applicable federal civil rights laws and [place] laws. We do not discriminate on the basis of race, color, national origin, age, disability, sex, sexual orientation, or gender identity.            Thank you!     Thank you for choosing [facility]  for your care. Our goal is always to provide you with excellent care. Hearing back from our patients is one way we can continue to improve our services. Please take a few minutes to complete the written survey that you may receive in the mail after your visit with us. Thank " you!             Your Updated Medication List - Protect others around you: Learn how to safely use, store and throw away your medicines at www.disposemymeds.org.          This list is accurate as of 1/29/18 10:05 AM.  Always use your most recent med list.                   Brand Name Dispense Instructions for use Diagnosis    abacavir 300 MG tablet    ZIAGEN     Take 600 mg by mouth every evening        albuterol 108 (90 BASE) MCG/ACT Inhaler    PROAIR HFA/PROVENTIL HFA/VENTOLIN HFA    1 Inhaler    Inhale 2 puffs into the lungs every 6 hours as needed for shortness of breath / dyspnea or wheezing    Cough       AMLODIPINE BESYLATE PO      Take 5 mg by mouth daily        atorvastatin 40 MG tablet    LIPITOR     Take 40 mg by mouth At Bedtime        B COMPLEX-C-FOLIC ACID PO      Take 1 tablet by mouth At Bedtime        benzonatate 100 MG capsule    TESSALON    42 capsule    Take 1 capsule (100 mg) by mouth 3 times daily as needed for cough    RSV bronchiolitis       calcium carbonate 500 MG chewable tablet    TUMS     Take 3 chew tab by mouth 4 times daily        chlorhexidine 0.12 % solution    PERIDEX     Rinse and gargle 15 ml by mouth twice a day as directed.        CLONAZEPAM PO      Take 0.5 mg by mouth nightly as needed for anxiety (restless legs)        CLOPIDOGREL BISULFATE PO      Take 75 mg by mouth every evening        dapsone 100 MG tablet      Take 100 mg by mouth At Bedtime        dolutegravir 50 MG tablet    TIVICAY     Take 50 mg by mouth At Bedtime        DOXERCALCIFEROL IV      Inject 6 mcg into the vein three times a week (with dialysis)        epoetin brittni 50985 UNIT/ML injection    EPOGEN/PROCRIT     Inject 11,000 Units Subcutaneous three times a week WITH DIALYSIS        * gabapentin 300 MG capsule    NEURONTIN     Take 300 mg by mouth every morning        * gabapentin 300 MG capsule    NEURONTIN     Take 600 mg by mouth every evening        HUMALOG PEN SC      Take 15 units TID and an  "additional 2 units if BG is over 150        HYDRALAZINE HCL PO      Take 25 mg by mouth 2 times daily        imiquimod 5 % cream    ALDARA     Apply topically as needed        insulin glargine 100 UNIT/ML injection    LANTUS     Inject 50 Units Subcutaneous daily Takes about noon        iron sucrose 20 MG/ML injection    VENOFER     Inject 50 mg into the vein once a week WIth dialysis        Isosorbide Mononitrate  MG Tb24     30 tablet    Take 1 tablet (120 mg) by mouth every evening    Coronary artery disease involving native heart, angina presence unspecified, unspecified vessel or lesion type, Hypertension secondary to other renal disorders       lamISIL AT 1 % cream   Generic drug:  terbinafine      Apply topically 2 times daily as needed        losartan 50 MG tablet    COZAAR    30 tablet    Take 1 tablet (50 mg) by mouth every evening    Hypertension secondary to other renal disorders       MAGNESIUM OXIDE PO      Take 400 mg by mouth At Bedtime        mycophenolate 500 MG tablet    GENERIC EQUIVALENT     Take 1 tablet (500 mg) by mouth 2 times daily On an empty stomach    ESRD (end stage renal disease) on dialysis (H)       NEPRO PO      1-3 times daily        nitroGLYcerin 0.4 MG sublingual tablet    NITROSTAT    25 tablet    One tablet under the tongue every 5 minutes if needed for chest pain. May repeat every 5 minutes for a maximum of 3 doses in 15 minutes\"    Coronary artery disease due to lipid rich plaque, Status post coronary angioplasty       omega-3 acid ethyl esters 1 G capsule    Lovaza     Take 2 g by mouth 2 times daily        order for DME     1 each    Equipment being ordered: Other: 4WW Treatment Diagnosis: Decreased activity tolerance    SOB (shortness of breath), Generalized muscle weakness       PREZISTA PO      Take 800 mg by mouth At Bedtime.        priLOSEC 40 MG capsule   Generic drug:  omeprazole      Take 40 mg by mouth daily 1 hour before dinner        ritonavir 100 MG " capsule    NORVIR     Take 1 capsule by mouth At Bedtime        * Notice:  This list has 2 medication(s) that are the same as other medications prescribed for you. Read the directions carefully, and ask your doctor or other care provider to review them with you.

## 2018-01-29 NOTE — LETTER
1/29/2018      Cristina Ann Baker, MD [facility] [address] [facility] [address]      RE: Donald Guevaraido       Dear Colleague,    I had the pleasure of seeing Donald Raza in the [facility].    Service Date: 01/29/2018      REASON FOR CLINIC VISIT:  Followup coronary artery disease.      HISTORY OF PRESENT ILLNESS:  Mr. Raza is a very pleasant 67-year-old gentleman originally from [place] with history of CAD with history of several non-STEMIs and PCIs.  He has end-stage renal disease on hemodialysis, HIV positive, hypertension, dyslipidemia, significant hypertriglyceridemia and diabetes.  Today, he is coming for routine followup.        To be noted, the patient recently underwent a coronary angiogram in October for chest discomfort and the angiogram showed patent diagonal stent and patent mid to distal LAD stent.  The ostium of the diagonal had 60%-70% stenosis, which was FFR negative.  The diagonal itself was a branching vessel and the inferior pole was a small vessel with 70% stenosis.  The distal LAD were also underwent FFR which was negative.  There were minimal luminal irregularities in the circumflex system and mild disease in the RCA system.  Appropriately medical management was recommended.  The patient was then again admitted about a month ago with chest discomfort in the setting of significant anemia with hemoglobin of 6.  He received blood transfusion and his chest discomfort went away.  The patient was admitted about a week ago for acute bronchitis and was found to have RSV infection.      Today, he is coming for routine cardiac followup.  The patient is slowly recovering from the RSV infection.  No chest discomfort either at rest or with physical activity, breathing is stable.  His latest hemoglobin is 8.1.        He is on aspirin, Plavix, Lipitor, amlodipine, Imdur and losartan.  He was on beta blocker in the past but that has been stopped due to concern  for bradycardia.  LDL is well controlled at 30.        To be noted, he does not have any bleeding issues, any bright blood per rectum or melena.  It looks like anemia has been blamed on end-stage renal disease.      He also has some labile blood pressure and in fact patient tells me that if his blood pressure runs on the lower side, he can have issues with having successful dialysis.  He gets dialysis Tuesday, Thursday and Saturday.      PHYSICAL EXAMINATION:   VITAL SIGNS:  Blood pressure 142/72, heart rate 70, regular, weight 201 pounds, BMI 28.1.   GENERAL:  The patient appears pleasant, comfortable.   NECK:  Normal JVP, no bruit.   CARDIOVASCULAR SYSTEM:  S1, S2 normal, no murmur, rub or gallop.   RESPIRATORY SYSTEM:  Clear to auscultation bilaterally system.   ABDOMEN:  Soft, nontender.   EXTREMITIES:  No pitting pedal edema.   NEUROLOGICAL:  Alert, oriented x3.   PSYCHIATRIC:  Normal affect.   SKIN:  No obvious rash.   HEENT:  Pallor, no icterus.      IMPRESSION AND PLAN:  A very pleasant 67-year-old gentleman with CAD with history of several non-STEMIs and PCIs with last coronary angiogram in 10/2017 showing moderate LAD and ostial diagonal disease, FFR was negative with normal LV function with end-stage renal disease on hemodialysis, hypertension, dyslipidemia with recent episode of respiratory syncytial virus bronchitis.  Overall, cardiac status-wise he is doing well without any symptoms of angina or congestive heart failure.        At this time, I recommend continuing current cardiac medications of dual antiplatelet therapy, high-intensity statin and it interstitial blocker, long-acting nitrate, calcium channel blocker.  He had issues with anemia but this does not look like a GI blood loss tissue, likely due to end-stage renal disease.  However, in future if there is any concern about GI bleeding, Plavix can be discontinued and he can only use aspirin for antiplatelet therapy.  At this time, I am setting  up a followup in 6 months, sooner if patient notices any change in clinical status, especially if any exertional related chest discomfort.         CHAD BURNETT MD             D: 2018   T: 2018   MT: CARLITOS      Name:     GREGORIO BRUSH   MRN:      [id number]        Account:      [id number]   :      1950           Service Date: 2018      Document: [id number]           Outpatient Encounter Prescriptions as of 2018   Medication Sig Dispense Refill     benzonatate (TESSALON) 100 MG capsule Take 1 capsule (100 mg) by mouth 3 times daily as needed for cough 42 capsule 0     omega-3 acid ethyl esters (LOVAZA) 1 G capsule Take 2 g by mouth 2 times daily       losartan (COZAAR) 50 MG tablet Take 1 tablet (50 mg) by mouth every evening 30 tablet 3     Isosorbide Mononitrate  MG TB24 Take 1 tablet (120 mg) by mouth every evening 30 tablet 11     insulin glargine (LANTUS) 100 UNIT/ML injection Inject 50 Units Subcutaneous daily Takes about noon       HYDRALAZINE HCL PO Take 25 mg by mouth 2 times daily       mycophenolate (GENERIC EQUIVALENT) 500 MG tablet Take 1 tablet (500 mg) by mouth 2 times daily On an empty stomach       AMLODIPINE BESYLATE PO Take 5 mg by mouth daily        Insulin Lispro (HUMALOG PEN SC) Take 15 units TID and an additional 2 units if BG is over 150       calcium carbonate (TUMS) 500 MG chewable tablet Take 3 chew tab by mouth 4 times daily        B COMPLEX-C-FOLIC ACID PO Take 1 tablet by mouth At Bedtime        albuterol (PROAIR HFA/PROVENTIL HFA/VENTOLIN HFA) 108 (90 BASE) MCG/ACT Inhaler Inhale 2 puffs into the lungs every 6 hours as needed for shortness of breath / dyspnea or wheezing 1 Inhaler 1     Nutritional Supplements (NEPRO PO) 1-3 times daily       omeprazole (PRILOSEC) 40 MG capsule Take 40 mg by mouth daily 1 hour before dinner       gabapentin (NEURONTIN) 300 MG capsule Take 600 mg by mouth every evening       imiquimod (ALDARA) 5 % cream Apply  "topically as needed       DOXERCALCIFEROL IV Inject 6 mcg into the vein three times a week (with dialysis)       CLONAZEPAM PO Take 0.5 mg by mouth nightly as needed for anxiety (restless legs)       CLOPIDOGREL BISULFATE PO Take 75 mg by mouth every evening        abacavir (ZIAGEN) 300 MG tablet Take 600 mg by mouth every evening        chlorhexidine (CHLORHEXIDINE) 0.12 % solution Rinse and gargle 15 ml by mouth twice a day as directed.       terbinafine (LAMISIL AT) 1 % cream Apply topically 2 times daily as needed        atorvastatin (LIPITOR) 40 MG tablet Take 40 mg by mouth At Bedtime       epoetin brittni (EPOGEN,PROCRIT) 44107 UNIT/ML injection Inject 11,000 Units Subcutaneous three times a week WITH DIALYSIS       iron sucrose (VENOFER) 20 MG/ML injection Inject 50 mg into the vein once a week WIth dialysis       MAGNESIUM OXIDE PO Take 400 mg by mouth At Bedtime       dolutegravir (TIVICAY) 50 MG tablet Take 50 mg by mouth At Bedtime       nitroglycerin (NITROSTAT) 0.4 MG SL tablet One tablet under the tongue every 5 minutes if needed for chest pain. May repeat every 5 minutes for a maximum of 3 doses in 15 minutes\" 25 tablet 3     gabapentin (NEURONTIN) 300 MG capsule Take 300 mg by mouth every morning        dapsone 100 MG tablet Take 100 mg by mouth At Bedtime        Darunavir Ethanolate (PREZISTA PO) Take 800 mg by mouth At Bedtime.       ritonavir (NORVIR) 100 MG capsule Take 1 capsule by mouth At Bedtime        order for DME Equipment being ordered: Other: 4WW  Treatment Diagnosis: Decreased activity tolerance 1 each 0     No facility-administered encounter medications on file as of 1/29/2018.        Again, thank you for allowing me to participate in the care of your patient.      Sincerely,    Arnoldo Diaz MD     [facility]    "

## 2018-01-29 NOTE — PROGRESS NOTES
Service Date: 01/29/2018      REASON FOR CLINIC VISIT:  Followup coronary artery disease.      HISTORY OF PRESENT ILLNESS:  Mr. Raza is a very pleasant 67-year-old gentleman originally from [place] with history of CAD with history of several non-STEMIs and PCIs.  He has end-stage renal disease on hemodialysis, HIV positive, hypertension, dyslipidemia, significant hypertriglyceridemia and diabetes.  Today, he is coming for routine followup.        To be noted, the patient recently underwent a coronary angiogram in October for chest discomfort and the angiogram showed patent diagonal stent and patent mid to distal LAD stent.  The ostium of the diagonal had 60%-70% stenosis, which was FFR negative.  The diagonal itself was a branching vessel and the inferior pole was a small vessel with 70% stenosis.  The distal LAD were also underwent FFR which was negative.  There were minimal luminal irregularities in the circumflex system and mild disease in the RCA system.  Appropriately medical management was recommended.  The patient was then again admitted about a month ago with chest discomfort in the setting of significant anemia with hemoglobin of 6.  He received blood transfusion and his chest discomfort went away.  The patient was admitted about a week ago for acute bronchitis and was found to have RSV infection.      Today, he is coming for routine cardiac followup.  The patient is slowly recovering from the RSV infection.  No chest discomfort either at rest or with physical activity, breathing is stable.  His latest hemoglobin is 8.1.        He is on aspirin, Plavix, Lipitor, amlodipine, Imdur and losartan.  He was on beta blocker in the past but that has been stopped due to concern for bradycardia.  LDL is well controlled at 30.        To be noted, he does not have any bleeding issues, any bright blood per rectum or melena.  It looks like anemia has been blamed on end-stage renal disease.      He also has some labile  blood pressure and in fact patient tells me that if his blood pressure runs on the lower side, he can have issues with having successful dialysis.  He gets dialysis Tuesday, Thursday and Saturday.      PHYSICAL EXAMINATION:   VITAL SIGNS:  Blood pressure 142/72, heart rate 70, regular, weight 201 pounds, BMI 28.1.   GENERAL:  The patient appears pleasant, comfortable.   NECK:  Normal JVP, no bruit.   CARDIOVASCULAR SYSTEM:  S1, S2 normal, no murmur, rub or gallop.   RESPIRATORY SYSTEM:  Clear to auscultation bilaterally system.   ABDOMEN:  Soft, nontender.   EXTREMITIES:  No pitting pedal edema.   NEUROLOGICAL:  Alert, oriented x3.   PSYCHIATRIC:  Normal affect.   SKIN:  No obvious rash.   HEENT:  Pallor, no icterus.      IMPRESSION AND PLAN:  A very pleasant 67-year-old gentleman with CAD with history of several non-STEMIs and PCIs with last coronary angiogram in 10/2017 showing moderate LAD and ostial diagonal disease, FFR was negative with normal LV function with end-stage renal disease on hemodialysis, hypertension, dyslipidemia with recent episode of respiratory syncytial virus bronchitis.  Overall, cardiac status-wise he is doing well without any symptoms of angina or congestive heart failure.        At this time, I recommend continuing current cardiac medications of dual antiplatelet therapy, high-intensity statin and it interstitial blocker, long-acting nitrate, calcium channel blocker.  He had issues with anemia but this does not look like a GI blood loss tissue, likely due to end-stage renal disease.  However, in future if there is any concern about GI bleeding, Plavix can be discontinued and he can only use aspirin for antiplatelet therapy.  At this time, I am setting up a followup in 6 months, sooner if patient notices any change in clinical status, especially if any exertional related chest discomfort.         CHAD BURNETT MD             D: 01/29/2018   T: 01/29/2018   MT: CARLITOS      Name:     GONSALO  GREGORIO   MRN:      [id number]        Account:      [id number]   :      1950           Service Date: 2018      Document: [id number]

## 2018-01-29 NOTE — PROGRESS NOTES
HPI and Plan:   See dictation(#[id number])    Orders Placed This Encounter   Procedures     Follow-Up with Cardiac Advanced Practice Provider       No orders of the defined types were placed in this encounter.      There are no discontinued medications.      Encounter Diagnosis   Name Primary?     Coronary artery disease involving native coronary artery of native heart without angina pectoris        CURRENT MEDICATIONS:  Current Outpatient Prescriptions   Medication Sig Dispense Refill     benzonatate (TESSALON) 100 MG capsule Take 1 capsule (100 mg) by mouth 3 times daily as needed for cough 42 capsule 0     omega-3 acid ethyl esters (LOVAZA) 1 G capsule Take 2 g by mouth 2 times daily       losartan (COZAAR) 50 MG tablet Take 1 tablet (50 mg) by mouth every evening 30 tablet 3     Isosorbide Mononitrate  MG TB24 Take 1 tablet (120 mg) by mouth every evening 30 tablet 11     insulin glargine (LANTUS) 100 UNIT/ML injection Inject 50 Units Subcutaneous daily Takes about noon       HYDRALAZINE HCL PO Take 25 mg by mouth 2 times daily       mycophenolate (GENERIC EQUIVALENT) 500 MG tablet Take 1 tablet (500 mg) by mouth 2 times daily On an empty stomach       AMLODIPINE BESYLATE PO Take 5 mg by mouth daily        Insulin Lispro (HUMALOG PEN SC) Take 15 units TID and an additional 2 units if BG is over 150       calcium carbonate (TUMS) 500 MG chewable tablet Take 3 chew tab by mouth 4 times daily        B COMPLEX-C-FOLIC ACID PO Take 1 tablet by mouth At Bedtime        albuterol (PROAIR HFA/PROVENTIL HFA/VENTOLIN HFA) 108 (90 BASE) MCG/ACT Inhaler Inhale 2 puffs into the lungs every 6 hours as needed for shortness of breath / dyspnea or wheezing 1 Inhaler 1     Nutritional Supplements (NEPRO PO) 1-3 times daily       omeprazole (PRILOSEC) 40 MG capsule Take 40 mg by mouth daily 1 hour before dinner       gabapentin (NEURONTIN) 300 MG capsule Take 600 mg by mouth every evening       imiquimod (ALDARA) 5 % cream Apply  "topically as needed       DOXERCALCIFEROL IV Inject 6 mcg into the vein three times a week (with dialysis)       CLONAZEPAM PO Take 0.5 mg by mouth nightly as needed for anxiety (restless legs)       CLOPIDOGREL BISULFATE PO Take 75 mg by mouth every evening        abacavir (ZIAGEN) 300 MG tablet Take 600 mg by mouth every evening        chlorhexidine (CHLORHEXIDINE) 0.12 % solution Rinse and gargle 15 ml by mouth twice a day as directed.       terbinafine (LAMISIL AT) 1 % cream Apply topically 2 times daily as needed        atorvastatin (LIPITOR) 40 MG tablet Take 40 mg by mouth At Bedtime       epoetin brittni (EPOGEN,PROCRIT) 32945 UNIT/ML injection Inject 11,000 Units Subcutaneous three times a week WITH DIALYSIS       iron sucrose (VENOFER) 20 MG/ML injection Inject 50 mg into the vein once a week WIth dialysis       MAGNESIUM OXIDE PO Take 400 mg by mouth At Bedtime       dolutegravir (TIVICAY) 50 MG tablet Take 50 mg by mouth At Bedtime       nitroglycerin (NITROSTAT) 0.4 MG SL tablet One tablet under the tongue every 5 minutes if needed for chest pain. May repeat every 5 minutes for a maximum of 3 doses in 15 minutes\" 25 tablet 3     gabapentin (NEURONTIN) 300 MG capsule Take 300 mg by mouth every morning        dapsone 100 MG tablet Take 100 mg by mouth At Bedtime        Darunavir Ethanolate (PREZISTA PO) Take 800 mg by mouth At Bedtime.       ritonavir (NORVIR) 100 MG capsule Take 1 capsule by mouth At Bedtime        order for DME Equipment being ordered: Other: 4WW  Treatment Diagnosis: Decreased activity tolerance 1 each 0       ALLERGIES     Allergies   Allergen Reactions     Calcium Acetate Other (See Comments)     Other reaction(s): Other, see comments  Pain in chest and back  Pain in chest area (sensitive skin)      Diagnostic X-Ray Materials Other (See Comments)     PN: renal failure     Lisinopril      Sulfa Drugs        PAST MEDICAL HISTORY:  Past Medical History:   Diagnosis Date     ACS (acute " coronary syndrome) (H) 5/2/2016     Allergic rhinitis, cause unspecified      Bilateral pneumonia 1/7/2017     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, recurrent     Bradycardia 5/28/2016     CAD S/P percutaneous coronary angioplasty 6/15/2015     Chest pain      CKD (chronic kidney disease)     Hemodialysis     Dilated aortic root (H) 5/6/2016     ESRD (end stage renal disease) on dialysis (H) 5/6/2016     Hemodialysis-associated hypotension 5/22/2016     Human immunodeficiency virus (HIV) disease      Hypertension 2010     Hypotension, unspecified hypotension type 5/22/2016     Impotence of organic origin      Increased prostate specific antigen (PSA) velocity 08/08/2016    Awaiting bx on blood thinner     Mixed hyperlipidemia      Near syncope 2016    with hemodialysis     NSTEMI (non-ST elevated myocardial infarction) (H) 12/2015, 5/2016     Pulmonary HTN     Mod     TIA (transient ischemic attack) 5/2016     Type 2 diabetes mellitus (H) age 50     Unstable angina (H) 3/4/2016       PAST SURGICAL HISTORY:  Past Surgical History:   Procedure Laterality Date     ANGIOGRAM  03-04-16    No culprit lesions, stents widely patent      ANGIOGRAM  05-06-16    Cutting balloon ptca=Diag     APPENDECTOMY  2000     CHOLECYSTECTOMY, LAPOROSCOPIC       COLOSTOMY  09/30/1999    Temporary for diverticulitis     HEART CATH, ANGIOPLASTY  08-18-16    LAD PCI. Stented with a 3.0 x 8 mm Xience Alpine stent.     STENT, CORONARY, S660 15/18  12/2015    VANITA=Diag, PTCA=LAD     STENT, CORONARY, S660 15/18  06/2015    VANITA=LAD       FAMILY HISTORY:  Family History   Problem Relation Age of Onset     HEART DISEASE Brother 40     CABG     KIDNEY DISEASE Sister      Hypertension Sister      HEART DISEASE Brother      Dilated aorta       SOCIAL HISTORY:  Social History     Social History     Marital status:      Spouse name: N/A     Number of children: N/A     Years of education: N/A     Social History Main Topics     Smoking status:  "Former Smoker     Packs/day: 2.00     Years: 41.00     Types: Cigarettes     Quit date: 2003     Smokeless tobacco: Never Used     Alcohol use No     Drug use: No     Sexual activity: Not Asked     Other Topics Concern     Parent/Sibling W/ Cabg, Mi Or Angioplasty Before 65f 55m? Yes     Caffeine Concern Yes     2 cups daily     Sleep Concern Yes     Special Diet No      more proteins     Exercise Yes     Cardiac rehab      Social History Narrative       Review of Systems:  Skin:  Negative       Eyes:  Positive for glasses    ENT:  Negative      Respiratory:  Positive for dyspnea on exertion;cough;mucoid expectorant chest congestion   Cardiovascular:    dizziness;Positive for occ  Gastroenterology: Negative      Genitourinary:  Negative      Musculoskeletal:  Positive for arthritis    Neurologic:  Positive for numbness or tingling of feet    Psychiatric:  Negative      Heme/Lymph/Imm:  Negative      Endocrine:  Positive for diabetes;night sweats;hot flashes      Physical Exam:  Vitals: /72  Pulse 73  Ht 1.803 m (5' 11\")  Wt 91.3 kg (201 lb 4.8 oz)  BMI 28.08 kg/m2    Constitutional:           Skin:             Head:           Eyes:           Lymph:      ENT:           Neck:           Respiratory:            Cardiac:                                                           GI:           Extremities and Muscular Skeletal:                 Neurological:           Psych:             CC  Arnoldo Diaz MD  [address]  [place] [id number]                  "

## 2018-02-01 ENCOUNTER — HOSPITAL ENCOUNTER (INPATIENT)
Facility: CLINIC | Age: 68
LOS: 3 days | Discharge: HOME | DRG: 974 | End: 2018-02-04
Attending: HOSPITALIST | Admitting: HOSPITALIST
Payer: MEDICARE

## 2018-02-01 ENCOUNTER — HOSPITAL ENCOUNTER (EMERGENCY)
Facility: CLINIC | Age: 68
Discharge: ANOTHER HOSPITAL | End: 2018-02-01
Attending: EMERGENCY MEDICINE | Admitting: EMERGENCY MEDICINE
Payer: MEDICARE

## 2018-02-01 ENCOUNTER — APPOINTMENT (OUTPATIENT)
Dept: GENERAL RADIOLOGY | Facility: CLINIC | Age: 68
End: 2018-02-01
Attending: EMERGENCY MEDICINE
Payer: MEDICARE

## 2018-02-01 VITALS
SYSTOLIC BLOOD PRESSURE: 149 MMHG | TEMPERATURE: 99 F | BODY MASS INDEX: 27.77 KG/M2 | DIASTOLIC BLOOD PRESSURE: 71 MMHG | RESPIRATION RATE: 18 BRPM | WEIGHT: 199.08 LBS | OXYGEN SATURATION: 91 %

## 2018-02-01 DIAGNOSIS — N18.6 ESRD (END STAGE RENAL DISEASE) ON DIALYSIS (H): ICD-10-CM

## 2018-02-01 DIAGNOSIS — R06.00 DYSPNEA, UNSPECIFIED TYPE: ICD-10-CM

## 2018-02-01 DIAGNOSIS — D64.9 SEVERE ANEMIA: ICD-10-CM

## 2018-02-01 DIAGNOSIS — Z99.2 ESRD (END STAGE RENAL DISEASE) ON DIALYSIS (H): ICD-10-CM

## 2018-02-01 DIAGNOSIS — J96.01 ACUTE RESPIRATORY FAILURE WITH HYPOXIA (H): Primary | ICD-10-CM

## 2018-02-01 PROBLEM — J18.9 PNEUMONIA: Status: ACTIVE | Noted: 2018-02-01

## 2018-02-01 LAB
ABO + RH BLD: NORMAL
ABO + RH BLD: NORMAL
ALBUMIN SERPL-MCNC: 3.3 G/DL (ref 3.4–5)
ALP SERPL-CCNC: 121 U/L (ref 40–150)
ALT SERPL W P-5'-P-CCNC: 20 U/L (ref 0–70)
ANION GAP SERPL CALCULATED.3IONS-SCNC: 9 MMOL/L (ref 3–14)
AST SERPL W P-5'-P-CCNC: 16 U/L (ref 0–45)
BASOPHILS # BLD AUTO: 0 10E9/L (ref 0–0.2)
BASOPHILS NFR BLD AUTO: 0.1 %
BILIRUB DIRECT SERPL-MCNC: 0.2 MG/DL (ref 0–0.2)
BILIRUB SERPL-MCNC: 0.5 MG/DL (ref 0.2–1.3)
BLD GP AB SCN SERPL QL: NORMAL
BLD PROD TYP BPU: NORMAL
BLD PROD TYP BPU: NORMAL
BLD UNIT ID BPU: 0
BLOOD BANK CMNT PATIENT-IMP: NORMAL
BLOOD PRODUCT CODE: NORMAL
BPU ID: NORMAL
BUN SERPL-MCNC: 73 MG/DL (ref 7–30)
CALCIUM SERPL-MCNC: 8.7 MG/DL (ref 8.5–10.1)
CHLORIDE SERPL-SCNC: 105 MMOL/L (ref 94–109)
CO2 SERPL-SCNC: 23 MMOL/L (ref 20–32)
CREAT SERPL-MCNC: 8.85 MG/DL (ref 0.66–1.25)
DIFFERENTIAL METHOD BLD: ABNORMAL
EOSINOPHIL # BLD AUTO: 0.1 10E9/L (ref 0–0.7)
EOSINOPHIL NFR BLD AUTO: 1.4 %
ERYTHROCYTE [DISTWIDTH] IN BLOOD BY AUTOMATED COUNT: 16.8 % (ref 10–15)
GFR SERPL CREATININE-BSD FRML MDRD: 6 ML/MIN/1.7M2
GLUCOSE BLDC GLUCOMTR-MCNC: 142 MG/DL (ref 70–99)
GLUCOSE BLDC GLUCOMTR-MCNC: 175 MG/DL (ref 70–99)
GLUCOSE BLDC GLUCOMTR-MCNC: 206 MG/DL (ref 70–99)
GLUCOSE BLDC GLUCOMTR-MCNC: 326 MG/DL (ref 70–99)
GLUCOSE BLDC GLUCOMTR-MCNC: 331 MG/DL (ref 70–99)
GLUCOSE BLDC GLUCOMTR-MCNC: 354 MG/DL (ref 70–99)
GLUCOSE SERPL-MCNC: 225 MG/DL (ref 70–99)
HBA1C MFR BLD: NORMAL % (ref 4.3–6)
HCT VFR BLD AUTO: 20.5 % (ref 40–53)
HGB BLD-MCNC: 6.4 G/DL (ref 13.3–17.7)
HGB BLD-MCNC: 7.8 G/DL (ref 13.3–17.7)
IMM GRANULOCYTES # BLD: 0.1 10E9/L (ref 0–0.4)
IMM GRANULOCYTES NFR BLD: 1.1 %
INTERPRETATION ECG - MUSE: NORMAL
LYMPHOCYTES # BLD AUTO: 0.9 10E9/L (ref 0.8–5.3)
LYMPHOCYTES NFR BLD AUTO: 12.2 %
MCH RBC QN AUTO: 29.4 PG (ref 26.5–33)
MCHC RBC AUTO-ENTMCNC: 31.2 G/DL (ref 31.5–36.5)
MCV RBC AUTO: 94 FL (ref 78–100)
MONOCYTES # BLD AUTO: 0.6 10E9/L (ref 0–1.3)
MONOCYTES NFR BLD AUTO: 7.7 %
NEUTROPHILS # BLD AUTO: 5.7 10E9/L (ref 1.6–8.3)
NEUTROPHILS NFR BLD AUTO: 77.5 %
NRBC # BLD AUTO: 0 10*3/UL
NRBC BLD AUTO-RTO: 0 /100
NT-PROBNP SERPL-MCNC: ABNORMAL PG/ML (ref 0–900)
NUM BPU REQUESTED: 1
PLATELET # BLD AUTO: 90 10E9/L (ref 150–450)
POTASSIUM SERPL-SCNC: 4.4 MMOL/L (ref 3.4–5.3)
PROT SERPL-MCNC: 7.1 G/DL (ref 6.8–8.8)
RBC # BLD AUTO: 2.18 10E12/L (ref 4.4–5.9)
SODIUM SERPL-SCNC: 137 MMOL/L (ref 133–144)
SPECIMEN EXP DATE BLD: NORMAL
TRANSFUSION STATUS PATIENT QL: NORMAL
TRANSFUSION STATUS PATIENT QL: NORMAL
TROPONIN I SERPL-MCNC: 0.02 UG/L (ref 0–0.04)
WBC # BLD AUTO: 7.4 10E9/L (ref 4–11)

## 2018-02-01 PROCEDURE — 25000128 H RX IP 250 OP 636: Performed by: EMERGENCY MEDICINE

## 2018-02-01 PROCEDURE — 86900 BLOOD TYPING SEROLOGIC ABO: CPT | Performed by: PHYSICIAN ASSISTANT

## 2018-02-01 PROCEDURE — A9270 NON-COVERED ITEM OR SERVICE: HCPCS | Mod: GY | Performed by: PHYSICIAN ASSISTANT

## 2018-02-01 PROCEDURE — 86901 BLOOD TYPING SEROLOGIC RH(D): CPT | Performed by: PHYSICIAN ASSISTANT

## 2018-02-01 PROCEDURE — 5A1D70Z PERFORMANCE OF URINARY FILTRATION, INTERMITTENT, LESS THAN 6 HOURS PER DAY: ICD-10-PCS | Performed by: INTERNAL MEDICINE

## 2018-02-01 PROCEDURE — 00000146 ZZHCL STATISTIC GLUCOSE BY METER IP

## 2018-02-01 PROCEDURE — 94640 AIRWAY INHALATION TREATMENT: CPT

## 2018-02-01 PROCEDURE — 80048 BASIC METABOLIC PNL TOTAL CA: CPT | Performed by: EMERGENCY MEDICINE

## 2018-02-01 PROCEDURE — 94640 AIRWAY INHALATION TREATMENT: CPT | Mod: 76

## 2018-02-01 PROCEDURE — 25000125 ZZHC RX 250: Performed by: EMERGENCY MEDICINE

## 2018-02-01 PROCEDURE — 99285 EMERGENCY DEPT VISIT HI MDM: CPT | Mod: 25

## 2018-02-01 PROCEDURE — 90937 HEMODIALYSIS REPEATED EVAL: CPT

## 2018-02-01 PROCEDURE — 25000131 ZZH RX MED GY IP 250 OP 636 PS 637: Mod: GY | Performed by: PHYSICIAN ASSISTANT

## 2018-02-01 PROCEDURE — P9016 RBC LEUKOCYTES REDUCED: HCPCS | Performed by: PHYSICIAN ASSISTANT

## 2018-02-01 PROCEDURE — 40000275 ZZH STATISTIC RCP TIME EA 10 MIN

## 2018-02-01 PROCEDURE — 85025 COMPLETE CBC W/AUTO DIFF WBC: CPT | Performed by: EMERGENCY MEDICINE

## 2018-02-01 PROCEDURE — A9270 NON-COVERED ITEM OR SERVICE: HCPCS | Mod: GY | Performed by: HOSPITALIST

## 2018-02-01 PROCEDURE — 25000132 ZZH RX MED GY IP 250 OP 250 PS 637: Mod: GY | Performed by: HOSPITALIST

## 2018-02-01 PROCEDURE — 87040 BLOOD CULTURE FOR BACTERIA: CPT | Performed by: EMERGENCY MEDICINE

## 2018-02-01 PROCEDURE — 80076 HEPATIC FUNCTION PANEL: CPT | Performed by: EMERGENCY MEDICINE

## 2018-02-01 PROCEDURE — 84484 ASSAY OF TROPONIN QUANT: CPT | Performed by: EMERGENCY MEDICINE

## 2018-02-01 PROCEDURE — 25000125 ZZHC RX 250: Performed by: HOSPITALIST

## 2018-02-01 PROCEDURE — 96374 THER/PROPH/DIAG INJ IV PUSH: CPT

## 2018-02-01 PROCEDURE — 36415 COLL VENOUS BLD VENIPUNCTURE: CPT | Performed by: PHYSICIAN ASSISTANT

## 2018-02-01 PROCEDURE — 12000007 ZZH R&B INTERMEDIATE

## 2018-02-01 PROCEDURE — 25000131 ZZH RX MED GY IP 250 OP 636 PS 637: Mod: GY | Performed by: HOSPITALIST

## 2018-02-01 PROCEDURE — 27210995 ZZH RX 272: Performed by: HOSPITALIST

## 2018-02-01 PROCEDURE — 93005 ELECTROCARDIOGRAM TRACING: CPT

## 2018-02-01 PROCEDURE — 25000128 H RX IP 250 OP 636: Performed by: HOSPITALIST

## 2018-02-01 PROCEDURE — 63400005 ZZH RX 634: Performed by: INTERNAL MEDICINE

## 2018-02-01 PROCEDURE — 71046 X-RAY EXAM CHEST 2 VIEWS: CPT

## 2018-02-01 PROCEDURE — G0378 HOSPITAL OBSERVATION PER HR: HCPCS

## 2018-02-01 PROCEDURE — 99223 1ST HOSP IP/OBS HIGH 75: CPT | Performed by: HOSPITALIST

## 2018-02-01 PROCEDURE — 96375 TX/PRO/DX INJ NEW DRUG ADDON: CPT

## 2018-02-01 PROCEDURE — 83880 ASSAY OF NATRIURETIC PEPTIDE: CPT | Performed by: EMERGENCY MEDICINE

## 2018-02-01 PROCEDURE — 99207 ZZC APP CREDIT; MD BILLING SHARED VISIT: CPT | Performed by: PHYSICIAN ASSISTANT

## 2018-02-01 PROCEDURE — 96372 THER/PROPH/DIAG INJ SC/IM: CPT

## 2018-02-01 PROCEDURE — 99207 ZZC CDG-CODE CATEGORY CHANGED: CPT | Performed by: HOSPITALIST

## 2018-02-01 PROCEDURE — 86923 COMPATIBILITY TEST ELECTRIC: CPT | Performed by: PHYSICIAN ASSISTANT

## 2018-02-01 PROCEDURE — 25000132 ZZH RX MED GY IP 250 OP 250 PS 637: Mod: GY | Performed by: PHYSICIAN ASSISTANT

## 2018-02-01 PROCEDURE — 86850 RBC ANTIBODY SCREEN: CPT | Performed by: PHYSICIAN ASSISTANT

## 2018-02-01 PROCEDURE — 25000128 H RX IP 250 OP 636: Performed by: INTERNAL MEDICINE

## 2018-02-01 PROCEDURE — 85018 HEMOGLOBIN: CPT | Performed by: PHYSICIAN ASSISTANT

## 2018-02-01 PROCEDURE — 25000125 ZZHC RX 250: Performed by: PHYSICIAN ASSISTANT

## 2018-02-01 RX ORDER — IPRATROPIUM BROMIDE AND ALBUTEROL SULFATE 2.5; .5 MG/3ML; MG/3ML
3 SOLUTION RESPIRATORY (INHALATION)
Status: DISCONTINUED | OUTPATIENT
Start: 2018-02-01 | End: 2018-02-04 | Stop reason: HOSPADM

## 2018-02-01 RX ORDER — ACETAMINOPHEN 650 MG/1
650 SUPPOSITORY RECTAL EVERY 4 HOURS PRN
Status: DISCONTINUED | OUTPATIENT
Start: 2018-02-01 | End: 2018-02-01

## 2018-02-01 RX ORDER — ALBUMIN, HUMAN INJ 5% 5 %
250 SOLUTION INTRAVENOUS
Status: DISCONTINUED | OUTPATIENT
Start: 2018-02-01 | End: 2018-02-03

## 2018-02-01 RX ORDER — NICOTINE POLACRILEX 4 MG
15-30 LOZENGE BUCCAL
Status: DISCONTINUED | OUTPATIENT
Start: 2018-02-01 | End: 2018-02-04 | Stop reason: HOSPADM

## 2018-02-01 RX ORDER — ONDANSETRON 2 MG/ML
4 INJECTION INTRAMUSCULAR; INTRAVENOUS EVERY 6 HOURS PRN
Status: DISCONTINUED | OUTPATIENT
Start: 2018-02-01 | End: 2018-02-01

## 2018-02-01 RX ORDER — ACETAMINOPHEN 325 MG/1
650 TABLET ORAL EVERY 4 HOURS PRN
Status: DISCONTINUED | OUTPATIENT
Start: 2018-02-01 | End: 2018-02-01

## 2018-02-01 RX ORDER — LEVOFLOXACIN 5 MG/ML
500 INJECTION, SOLUTION INTRAVENOUS ONCE
Status: COMPLETED | OUTPATIENT
Start: 2018-02-01 | End: 2018-02-01

## 2018-02-01 RX ORDER — DOXERCALCIFEROL 4 UG/2ML
2 INJECTION INTRAVENOUS
Status: COMPLETED | OUTPATIENT
Start: 2018-02-01 | End: 2018-02-01

## 2018-02-01 RX ORDER — ACETAMINOPHEN 325 MG/1
650 TABLET ORAL EVERY 4 HOURS PRN
Status: DISCONTINUED | OUTPATIENT
Start: 2018-02-01 | End: 2018-02-04 | Stop reason: HOSPADM

## 2018-02-01 RX ORDER — LOSARTAN POTASSIUM 50 MG/1
50 TABLET ORAL EVERY EVENING
Status: DISCONTINUED | OUTPATIENT
Start: 2018-02-01 | End: 2018-02-04 | Stop reason: HOSPADM

## 2018-02-01 RX ORDER — IPRATROPIUM BROMIDE AND ALBUTEROL SULFATE 2.5; .5 MG/3ML; MG/3ML
3 SOLUTION RESPIRATORY (INHALATION)
Status: DISCONTINUED | OUTPATIENT
Start: 2018-02-01 | End: 2018-02-01

## 2018-02-01 RX ORDER — DEXTROSE MONOHYDRATE 25 G/50ML
25-50 INJECTION, SOLUTION INTRAVENOUS
Status: DISCONTINUED | OUTPATIENT
Start: 2018-02-01 | End: 2018-02-04 | Stop reason: HOSPADM

## 2018-02-01 RX ORDER — LIDOCAINE 40 MG/G
CREAM TOPICAL
Status: DISCONTINUED | OUTPATIENT
Start: 2018-02-01 | End: 2018-02-04 | Stop reason: HOSPADM

## 2018-02-01 RX ORDER — AMLODIPINE BESYLATE 5 MG/1
5 TABLET ORAL DAILY
Status: DISCONTINUED | OUTPATIENT
Start: 2018-02-01 | End: 2018-02-04 | Stop reason: HOSPADM

## 2018-02-01 RX ORDER — ISOSORBIDE MONONITRATE 60 MG/1
120 TABLET, EXTENDED RELEASE ORAL EVERY EVENING
Status: DISCONTINUED | OUTPATIENT
Start: 2018-02-01 | End: 2018-02-04 | Stop reason: HOSPADM

## 2018-02-01 RX ORDER — GUAIFENESIN 600 MG/1
TABLET, EXTENDED RELEASE ORAL PRN
Status: ON HOLD | COMMUNITY
End: 2019-01-29

## 2018-02-01 RX ORDER — HEPARIN SODIUM 1000 [USP'U]/ML
500 INJECTION, SOLUTION INTRAVENOUS; SUBCUTANEOUS CONTINUOUS
Status: DISCONTINUED | OUTPATIENT
Start: 2018-02-01 | End: 2018-02-03

## 2018-02-01 RX ORDER — ATORVASTATIN CALCIUM 20 MG/1
40 TABLET, FILM COATED ORAL AT BEDTIME
Status: DISCONTINUED | OUTPATIENT
Start: 2018-02-01 | End: 2018-02-04 | Stop reason: HOSPADM

## 2018-02-01 RX ORDER — CLONAZEPAM 0.5 MG/1
0.5 TABLET ORAL
Status: DISCONTINUED | OUTPATIENT
Start: 2018-02-01 | End: 2018-02-04 | Stop reason: HOSPADM

## 2018-02-01 RX ORDER — CALCIUM CARBONATE 500 MG/1
500-1000 TABLET, CHEWABLE ORAL
Status: DISCONTINUED | OUTPATIENT
Start: 2018-02-01 | End: 2018-02-03

## 2018-02-01 RX ORDER — ONDANSETRON 2 MG/ML
4 INJECTION INTRAMUSCULAR; INTRAVENOUS EVERY 6 HOURS PRN
Status: DISCONTINUED | OUTPATIENT
Start: 2018-02-01 | End: 2018-02-04 | Stop reason: HOSPADM

## 2018-02-01 RX ORDER — CLOPIDOGREL BISULFATE 75 MG/1
75 TABLET ORAL EVERY EVENING
Status: DISCONTINUED | OUTPATIENT
Start: 2018-02-01 | End: 2018-02-04 | Stop reason: HOSPADM

## 2018-02-01 RX ORDER — HYDRALAZINE HYDROCHLORIDE 25 MG/1
25 TABLET, FILM COATED ORAL 2 TIMES DAILY
Status: DISCONTINUED | OUTPATIENT
Start: 2018-02-01 | End: 2018-02-04 | Stop reason: HOSPADM

## 2018-02-01 RX ORDER — DAPSONE 100 MG/1
100 TABLET ORAL AT BEDTIME
Status: DISCONTINUED | OUTPATIENT
Start: 2018-02-01 | End: 2018-02-04 | Stop reason: HOSPADM

## 2018-02-01 RX ORDER — GABAPENTIN 300 MG/1
300 CAPSULE ORAL EVERY MORNING
Status: DISCONTINUED | OUTPATIENT
Start: 2018-02-01 | End: 2018-02-04 | Stop reason: HOSPADM

## 2018-02-01 RX ORDER — IPRATROPIUM BROMIDE AND ALBUTEROL SULFATE 2.5; .5 MG/3ML; MG/3ML
3 SOLUTION RESPIRATORY (INHALATION) ONCE
Status: COMPLETED | OUTPATIENT
Start: 2018-02-01 | End: 2018-02-01

## 2018-02-01 RX ORDER — BENZONATATE 100 MG/1
100 CAPSULE ORAL 3 TIMES DAILY PRN
Status: DISCONTINUED | OUTPATIENT
Start: 2018-02-01 | End: 2018-02-04 | Stop reason: HOSPADM

## 2018-02-01 RX ORDER — ONDANSETRON 4 MG/1
4 TABLET, ORALLY DISINTEGRATING ORAL EVERY 6 HOURS PRN
Status: DISCONTINUED | OUTPATIENT
Start: 2018-02-01 | End: 2018-02-01

## 2018-02-01 RX ORDER — ABACAVIR 300 MG/1
600 TABLET ORAL EVERY EVENING
Status: DISCONTINUED | OUTPATIENT
Start: 2018-02-01 | End: 2018-02-04 | Stop reason: HOSPADM

## 2018-02-01 RX ORDER — RITONAVIR 100 MG/1
100 TABLET ORAL AT BEDTIME
Status: DISCONTINUED | OUTPATIENT
Start: 2018-02-01 | End: 2018-02-04 | Stop reason: HOSPADM

## 2018-02-01 RX ORDER — ACETAMINOPHEN 650 MG/1
650 SUPPOSITORY RECTAL EVERY 4 HOURS PRN
Status: DISCONTINUED | OUTPATIENT
Start: 2018-02-01 | End: 2018-02-04 | Stop reason: HOSPADM

## 2018-02-01 RX ORDER — GABAPENTIN 300 MG/1
600 CAPSULE ORAL EVERY EVENING
Status: DISCONTINUED | OUTPATIENT
Start: 2018-02-01 | End: 2018-02-04 | Stop reason: HOSPADM

## 2018-02-01 RX ORDER — ONDANSETRON 4 MG/1
4 TABLET, ORALLY DISINTEGRATING ORAL EVERY 6 HOURS PRN
Status: DISCONTINUED | OUTPATIENT
Start: 2018-02-01 | End: 2018-02-04 | Stop reason: HOSPADM

## 2018-02-01 RX ORDER — PREDNISONE 20 MG/1
40 TABLET ORAL DAILY
Status: DISCONTINUED | OUTPATIENT
Start: 2018-02-01 | End: 2018-02-02

## 2018-02-01 RX ORDER — HEPARIN SODIUM 1000 [USP'U]/ML
500 INJECTION, SOLUTION INTRAVENOUS; SUBCUTANEOUS
Status: COMPLETED | OUTPATIENT
Start: 2018-02-01 | End: 2018-02-01

## 2018-02-01 RX ADMIN — IPRATROPIUM BROMIDE AND ALBUTEROL SULFATE 3 ML: .5; 3 SOLUTION RESPIRATORY (INHALATION) at 07:19

## 2018-02-01 RX ADMIN — CLOPIDOGREL 75 MG: 75 TABLET, FILM COATED ORAL at 21:11

## 2018-02-01 RX ADMIN — SODIUM CHLORIDE 300 ML: 9 INJECTION, SOLUTION INTRAVENOUS at 11:52

## 2018-02-01 RX ADMIN — EPOETIN ALFA 10000 UNITS: 10000 SOLUTION INTRAVENOUS; SUBCUTANEOUS at 11:50

## 2018-02-01 RX ADMIN — SODIUM CHLORIDE 250 ML: 9 INJECTION, SOLUTION INTRAVENOUS at 11:52

## 2018-02-01 RX ADMIN — HYDRALAZINE HYDROCHLORIDE 25 MG: 25 TABLET ORAL at 10:52

## 2018-02-01 RX ADMIN — ABACAVIR 600 MG: 300 TABLET, FILM COATED ORAL at 21:11

## 2018-02-01 RX ADMIN — ACETAMINOPHEN 650 MG: 325 TABLET, FILM COATED ORAL at 23:26

## 2018-02-01 RX ADMIN — AZITHROMYCIN MONOHYDRATE 500 MG: 500 INJECTION, POWDER, LYOPHILIZED, FOR SOLUTION INTRAVENOUS at 06:37

## 2018-02-01 RX ADMIN — DARUNAVIR 800 MG: 800 TABLET, FILM COATED ORAL at 21:53

## 2018-02-01 RX ADMIN — INSULIN GLARGINE 50 UNITS: 100 INJECTION, SOLUTION SUBCUTANEOUS at 17:40

## 2018-02-01 RX ADMIN — DOLUTEGRAVIR SODIUM 50 MG: 50 TABLET, FILM COATED ORAL at 21:53

## 2018-02-01 RX ADMIN — LEVOFLOXACIN 500 MG: 5 INJECTION, SOLUTION INTRAVENOUS at 04:54

## 2018-02-01 RX ADMIN — BENZONATATE 100 MG: 100 CAPSULE ORAL at 23:26

## 2018-02-01 RX ADMIN — ATORVASTATIN CALCIUM 40 MG: 20 TABLET, FILM COATED ORAL at 21:53

## 2018-02-01 RX ADMIN — GABAPENTIN 600 MG: 300 CAPSULE ORAL at 21:10

## 2018-02-01 RX ADMIN — AMLODIPINE BESYLATE 5 MG: 5 TABLET ORAL at 10:52

## 2018-02-01 RX ADMIN — IPRATROPIUM BROMIDE AND ALBUTEROL SULFATE 3 ML: .5; 3 SOLUTION RESPIRATORY (INHALATION) at 17:00

## 2018-02-01 RX ADMIN — RITONAVIR 100 MG: 100 TABLET, FILM COATED ORAL at 21:53

## 2018-02-01 RX ADMIN — CEFTRIAXONE 2 G: 10 INJECTION, POWDER, FOR SOLUTION INTRAVENOUS at 06:36

## 2018-02-01 RX ADMIN — HYDRALAZINE HYDROCHLORIDE 25 MG: 25 TABLET ORAL at 21:11

## 2018-02-01 RX ADMIN — HEPARIN SODIUM 500 UNITS: 1000 INJECTION, SOLUTION INTRAVENOUS; SUBCUTANEOUS at 11:49

## 2018-02-01 RX ADMIN — INSULIN ASPART 3 UNITS: 100 INJECTION, SOLUTION INTRAVENOUS; SUBCUTANEOUS at 16:37

## 2018-02-01 RX ADMIN — INSULIN ASPART 2 UNITS: 100 INJECTION, SOLUTION INTRAVENOUS; SUBCUTANEOUS at 06:37

## 2018-02-01 RX ADMIN — BENZONATATE 100 MG: 100 CAPSULE ORAL at 16:37

## 2018-02-01 RX ADMIN — HEPARIN SODIUM 500 UNITS/HR: 1000 INJECTION, SOLUTION INTRAVENOUS; SUBCUTANEOUS at 11:50

## 2018-02-01 RX ADMIN — IPRATROPIUM BROMIDE AND ALBUTEROL SULFATE 3 ML: .5; 3 SOLUTION RESPIRATORY (INHALATION) at 19:29

## 2018-02-01 RX ADMIN — DAPSONE 100 MG: 100 TABLET ORAL at 21:53

## 2018-02-01 RX ADMIN — GABAPENTIN 300 MG: 300 CAPSULE ORAL at 10:52

## 2018-02-01 RX ADMIN — IPRATROPIUM BROMIDE AND ALBUTEROL SULFATE 3 ML: .5; 3 SOLUTION RESPIRATORY (INHALATION) at 11:30

## 2018-02-01 RX ADMIN — LOSARTAN POTASSIUM 50 MG: 50 TABLET ORAL at 21:11

## 2018-02-01 RX ADMIN — DOXERCALCIFEROL 2 MCG: 4 INJECTION, SOLUTION INTRAVENOUS at 11:50

## 2018-02-01 RX ADMIN — IPRATROPIUM BROMIDE AND ALBUTEROL SULFATE 3 ML: .5; 3 SOLUTION RESPIRATORY (INHALATION) at 03:26

## 2018-02-01 RX ADMIN — PREDNISONE 40 MG: 20 TABLET ORAL at 08:30

## 2018-02-01 RX ADMIN — INSULIN ASPART 8 UNITS: 100 INJECTION, SOLUTION INTRAVENOUS; SUBCUTANEOUS at 18:50

## 2018-02-01 RX ADMIN — ISOSORBIDE MONONITRATE 120 MG: 60 TABLET, EXTENDED RELEASE ORAL at 21:11

## 2018-02-01 ASSESSMENT — ENCOUNTER SYMPTOMS
COUGH: 1
SHORTNESS OF BREATH: 1
FEVER: 1
SLEEP DISTURBANCE: 1

## 2018-02-01 NOTE — CONSULTS
RENAL CONSULTATION NOTE    REFERRING MD: Chago Meadows MD    REASON FOR CONSULTATION:  ESRD    HPI:  67 y.o gentleman with HIV on ARV and ESRD, who was admitted for worsening cough. 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 He came in for further evaluation for worsening cough over the last couple days. CXR showed new right infrahilar and basilar infiltrates consistent with pneumonia. He was started on Rocephin and Zithromax.     I saw and evaluated the patient while on hemodialysis treatment.     ROS:  A complete review of systems was performed and is negative except as noted above.    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, recurrent     Bradycardia 5/28/2016     CAD S/P percutaneous coronary angioplasty 6/15/2015     Chest pain      CKD (chronic kidney disease)     Hemodialysis     Dilated aortic root (H) 5/6/2016     ESRD (end stage renal disease) on dialysis (H) 5/6/2016     Hemodialysis-associated hypotension 5/22/2016     Human immunodeficiency virus (HIV) disease      Hypertension 2010     Hypotension, unspecified hypotension type 5/22/2016     Impotence of organic origin      Increased prostate specific antigen (PSA) velocity 08/08/2016    Awaiting bx on blood thinner     Mixed hyperlipidemia      Near syncope 2016    with hemodialysis     NSTEMI (non-ST elevated myocardial infarction) (H) 12/2015, 5/2016     Pulmonary HTN     Mod     TIA (transient ischemic attack) 5/2016     Type 2 diabetes mellitus (H) age 50     Unstable angina (H) 3/4/2016       PSH:    Past Surgical History:   Procedure Laterality Date     ANGIOGRAM  03-04-16    No culprit lesions, stents widely patent      ANGIOGRAM  05-06-16    Cutting balloon ptca=Diag     APPENDECTOMY  2000     CHOLECYSTECTOMY, LAPOROSCOPIC       COLOSTOMY   09/30/1999    Temporary for diverticulitis     HEART CATH, ANGIOPLASTY  08-18-16    LAD PCI. Stented with a 3.0 x 8 mm Xience Alpine stent.     STENT, CORONARY, S660 15/18  12/2015    VANITA=Diag, PTCA=LAD     STENT, CORONARY, S660 15/18  06/2015    VANITA=LAD       MEDICATIONS:      sodium chloride (PF)  3 mL Intracatheter Q8H     cefTRIAXone  2 g Intravenous Q24H     [START ON 2/2/2018] azithromycin  250 mg Intravenous Q24H     predniSONE  40 mg Oral Daily     insulin aspart  1-10 Units Subcutaneous TID AC     insulin aspart  1-7 Units Subcutaneous At Bedtime     - MEDICATION INSTRUCTIONS for Dialysis Patients -   Does not apply See Admin Instructions     abacavir  600 mg Oral QPM     amLODIPine (NORVASC) tablet 5 mg  5 mg Oral Daily     atorvastatin  40 mg Oral At Bedtime     clopidogrel (PLAVIX) tablet 75 mg  75 mg Oral QPM     dapsone  100 mg Oral At Bedtime     darunavir  800 mg Oral At Bedtime     dolutegravir  50 mg Oral At Bedtime     gabapentin  300 mg Oral QAM     gabapentin  600 mg Oral QPM     hydrALAZINE (APRESOLINE) tablet 25 mg  25 mg Oral BID     isosorbide mononitrate  120 mg Oral QPM     losartan  50 mg Oral QPM     ritonavir  100 mg Oral At Bedtime     insulin glargine  50 Units Subcutaneous Daily     insulin aspart  15 Units Subcutaneous TID w/meals     ipratropium - albuterol 0.5 mg/2.5 mg/3 mL  3 mL Nebulization Q4H While awake       ALLERGIES:    Allergies as of 02/01/2018 - Danny as Reviewed 02/01/2018   Allergen Reaction Noted     Calcium acetate Other (See Comments) 07/27/2017     Diagnostic x-ray materials Other (See Comments) 09/24/2012     Lisinopril  07/23/2012     Sulfa drugs  07/23/2012       FH:    Family History   Problem Relation Age of Onset     HEART DISEASE Brother 40     CABG     KIDNEY DISEASE Sister      Hypertension Sister      HEART DISEASE Brother      Dilated aorta       SH:    Social History     Social History     Marital status:      Spouse name: N/A     Number of  "children: N/A     Years of education: N/A     Occupational History     Not on file.     Social History Main Topics     Smoking status: Former Smoker     Packs/day: 2.00     Years: 41.00     Types: Cigarettes     Quit date: 2003     Smokeless tobacco: Never Used     Alcohol use No     Drug use: No     Sexual activity: Not on file     Other Topics Concern     Parent/Sibling W/ Cabg, Mi Or Angioplasty Before 65f 55m? Yes     Caffeine Concern Yes     2 cups daily     Sleep Concern Yes     Special Diet No      more proteins     Exercise Yes     Cardiac rehab      Social History Narrative       PHYSICAL EXAM:    /84  Pulse 76  Temp 98.1  F (36.7  C) (Axillary)  Resp 16  Ht 1.803 m (5' 11\")  Wt 87.8 kg (193 lb 9.6 oz)  SpO2 96%  BMI 27 kg/m2  GENERAL: pleasant, alert, NAD  HEENT:  Normocephalic. No gross abnormalities.  Pupils equal.  MMM.   CV: RRR, +murmur, no clicks, gallops, or rubs, no edema  RESP: Poor airflow. No wheezes.  GI: Abdomen soft, NT  MUSCULOSKELETAL: extremities nl - no gross deformities noted  SKIN: no suspicious lesions or rashes, dry to touch  NEURO:  Strength normal and symmetric. A/o x3  PSYCH: mood good, affect appropriate  LYMPH: No palpable ant/post cervical     LABS:      CBC RESULTS:     Recent Labs  Lab 02/01/18  0250   WBC 7.4   RBC 2.18*   HGB 6.4*   HCT 20.5*   PLT 90*       BMP RESULTS:    Recent Labs  Lab 02/01/18  0250      POTASSIUM 4.4   CHLORIDE 105   CO2 23   BUN 73*   CR 8.85*   *   PRABHA 8.7       INRNo lab results found in last 7 days.     DIAGNOSTICS:  Reviewed    A/P:  67 y.o gentleman with ESRD and HIV, admitted for HCAP.     # ESRD   -3.5hrs, LAVF, eDW 85.5 kg, 24759 units Epogen, 2 mcg of Hectorol    # HCAP. He is on Rocephin and and Zithromax    # Anemia. Hgb was 6.4 on admission.    -67382 units of Epogen   -he is getting one unit of PRBC on HD    # HTN. Blood pressure is controlled on current regimen    # CKD-MBD. Hectorol    # HIV on ARV    Doyle " ADITYA Marcial MD  [facility] - Nephrology  Office Phone: [phone]  Pager: [phone]

## 2018-02-01 NOTE — PLAN OF CARE
Problem: Patient Care Overview  Goal: Plan of Care/Patient Progress Review  Outcome: No Change   Observation goals PRIOR TO DISCHARGE     Comments: List all goals to be met before discharge home:   - Dyspnea improved and oxygen saturations greater than 88% on room air or prior home oxygen levels - partially met, O2 96% on RA, pt c/o SOB   - Tolerates oral antibiotics or has plans for home infusion set up. - IV antibiotics ordered and given  - Vital signs normal or at patient baseline - partially met, T99.0F, will monitor.  - Infection is improving - not met  - Return to baseline functional status - not met  - Safe disposition plan has been identified - not met  - Nurse to notify provider when observation goals have been met and patient is ready for discharge.        Pt is DA [facility], arrived to unit around 0545. SBA, c/o dizziness when ambulating, weakness, SOB on exertion. Mod carb and dialysis diet, receives dialysis T, Th, Sat. Rocephin and zithromax ordered, pneumonia seen on chest xray. Denies pain, nausea. Hgb 6.4, per pt receives transfusions when Hgb below 6.0, will monitor. LS coarse, rhonchi heard. Sputum and gram stain specimen needed, pt informed. Continue to monitor.

## 2018-02-01 NOTE — PLAN OF CARE
Problem: Patient Care Overview  Goal: Plan of Care/Patient Progress Review  Pt A&Ox4, VSS on RA, placed on 2LPM NC for comfort. LS coarse, rhonchi. Up SBA. Sputum culture still needs to be collected. Strong cough, nonproductive. Plan for pt to receive hemodialysis today and have blood transfusion during HD. Pt went to HD @ 1115 in wheelchair. Pt reported planning to eat lunch when he returns.

## 2018-02-01 NOTE — H&P
[facility]    History and Physical  Hospitalist       Date of Admission:  2/1/2018  Date of Service (when I saw the patient): 02/01/18    Assessment & Plan   Donald Raza is a markedly pleasant 67 year old gentleman with AIDS, ESRD on HD, chronic anemia, CAD, asthma, Type 2 Diabetes mellitus causing neuropathy, prior history of colon cancer, gout, hypertension, and dyslipidemia, recently diagnosed with acute RSV bronchiolitis who presents with progressive cough and is found to have right middle lobe pneumonia and acute anemia.     1) Post-viral right middle lobe pneumonia: Recently diagnosed last week with RSV and now he returns for progression of cough. He is afebrile in the [facility] ED with stable vital signs. WBC 7.4, HGB 6.4 down from chronic 8, PLT 90 k (near or just below his baseline thrombocytopenia). BNP is markedly elevated. Troponin is 0.022. CMP shows ESRD. CXR shows a right middle lobe infiltrate not seen on last week's CXR. Blood cultures were sent and he was given Levaquin.     Overall we suspect post-viral pneumonia; there are no signs of SIRS. Elevated BNP likely due to ESRD; there are no signs of hypervolemia.       -- Observation with droplet precautions      -- Ceftriaxone and Azithromycin ordered; can likely transition today or tomorrow to Cefpodoxime and Azithromycin      -- Follow up blood and sputum cultures      -- Prednisone 40 mg for 5 days      -- Nebs as needed for asthma      -- Tessalon, Robitussin    2) Acute on chronic anemia due to ESRD: He gets intermittent blood transfusions with dialysis and the last time his counts dropped this low seems to have been the end of December 2017. He does take Plavix. There are no clinical signs of bleeding thus far.      -- Nephrology consulted; he is due for dialysis today and perhaps could get transfused there      -- Monitor    3) AIDS: Last CD4 9/2017 was 149; he takes Dapsone for PCP prophylaxis due to a sulfa allergy.      --  Resume Dapsone and ART when verified    4) CAD: Status post prior stents, followed by Dr. Diaz of [facility]      -- Resume Amlodipine, statin, Plavix, Hydralazine, Imdur, Losartan when verified    5) Type 2 Diabetes mellitus: We expect hyperglycemia with the Prednisone; high dose ISS insulin ordered and would resume Lantus and standing Lispro this morning with meals when verified. Resume Neuronin for neuopathic pain when verified    DVT Prophylaxis: Pneumatic Compression Devices  Code Status: Full Code    Disposition: Expected discharge later today or tomorrow after dialysis, possible transfusion    Chago Meadows MD    Primary Care Physician   *Aida Thomas    Chief Complaint   Cough    History is obtained from the patient and the ED physician whom I have spoken with    History of Present Illness   Donald Raza is a markedly pleasant 67 year old gentleman who presents with worsening cough; this started about two weeks ago and has persisted since then; it is associated with chills and progressive weakness and insomnia at night from the cough, which is non productive. He also describes ongoing chest congestion. He was hospitalized for these symptoms at [facility] from 1/19 through 1/21/2018 and found to have acute RSV bronchitis, eventually feeling better and discharged but since then he says he can not shake the cough and it started to worsen again about two days ago. That day after he finished his dialysis run he felt extremely weak and the symptoms worsened further this evening causing him to come to the [facility] ED. The symptoms persist now. He has no other acute complaints.    Past Medical History    I have reviewed this patient's medical history and updated it with pertinent information if needed.   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, recurrent     Bradycardia 5/28/2016     CAD S/P percutaneous coronary angioplasty 6/15/2015     Chest pain      CKD (chronic kidney disease)     Hemodialysis     Dilated aortic root (H) 5/6/2016     ESRD (end stage renal disease) on dialysis (H) 5/6/2016     Hemodialysis-associated hypotension 5/22/2016     Human immunodeficiency virus (HIV) disease      Hypertension 2010     Hypotension, unspecified hypotension type 5/22/2016     Impotence of organic origin      Increased prostate specific antigen (PSA) velocity 08/08/2016    Awaiting bx on blood thinner     Mixed hyperlipidemia      Near syncope 2016    with hemodialysis     NSTEMI (non-ST elevated myocardial infarction) (H) 12/2015, 5/2016     Pulmonary HTN     Mod     TIA (transient ischemic attack) 5/2016     Type 2 diabetes mellitus (H) age 50     Unstable angina (H) 3/4/2016       Past Surgical History   I have reviewed this patient's surgical history and updated it with pertinent information if needed.  Past Surgical History:   Procedure Laterality Date     ANGIOGRAM  03-04-16    No culprit lesions, stents widely patent      ANGIOGRAM  05-06-16    Cutting balloon ptca=Diag     APPENDECTOMY  2000     CHOLECYSTECTOMY, LAPOROSCOPIC       COLOSTOMY  09/30/1999    Temporary for diverticulitis     HEART CATH, ANGIOPLASTY  08-18-16    LAD PCI. Stented with a 3.0 x 8 mm Xience Alpine stent.     STENT, CORONARY, S660 15/18  12/2015    VANITA=Diag, PTCA=LAD     STENT, CORONARY, S660 15/18  06/2015    VAINTA=LAD       Prior to Admission Medications   Prior to Admission Medications   Prescriptions Last Dose Informant Patient Reported? Taking?   AMLODIPINE BESYLATE PO   Yes No   Sig: Take 5 mg by mouth daily    B COMPLEX-C-FOLIC ACID PO  Self Yes No   Sig: Take 1 tablet by mouth At Bedtime    CLONAZEPAM PO  Self Yes No   Sig: Take 0.5 mg by mouth nightly as needed for anxiety (restless legs)   CLOPIDOGREL BISULFATE PO  Self Yes No   Sig: Take 75 mg by mouth every evening     DOXERCALCIFEROL IV  Self Yes No   Sig: Inject 6 mcg into the vein three times a week (with dialysis)   Darunavir Ethanolate (PREZISTA PO)  Self Yes No   Sig: Take 800 mg by mouth At Bedtime.   HYDRALAZINE HCL PO   Yes No   Sig: Take 25 mg by mouth 2 times daily   Insulin Lispro (HUMALOG PEN SC)   Yes No   Sig: Take 15 units TID and an additional 2 units if BG is over 150   Isosorbide Mononitrate  MG TB24   No No   Sig: Take 1 tablet (120 mg) by mouth every evening   MAGNESIUM OXIDE PO  Self Yes No   Sig: Take 400 mg by mouth At Bedtime   Nutritional Supplements (NEPRO PO)  Self Yes No   Si-3 times daily   abacavir (ZIAGEN) 300 MG tablet  Self Yes No   Sig: Take 600 mg by mouth every evening    albuterol (PROAIR HFA/PROVENTIL HFA/VENTOLIN HFA) 108 (90 BASE) MCG/ACT Inhaler  Self No No   Sig: Inhale 2 puffs into the lungs every 6 hours as needed for shortness of breath / dyspnea or wheezing   atorvastatin (LIPITOR) 40 MG tablet  Self Yes No   Sig: Take 40 mg by mouth At Bedtime   benzonatate (TESSALON) 100 MG capsule   No No   Sig: Take 1 capsule (100 mg) by mouth 3 times daily as needed for cough   calcium carbonate (TUMS) 500 MG chewable tablet   Yes No   Sig: Take 3 chew tab by mouth 4 times daily    chlorhexidine (CHLORHEXIDINE) 0.12 % solution  Self Yes No   Sig: Rinse and gargle 15 ml by mouth twice a day as directed.   dapsone 100 MG tablet  Self Yes No   Sig: Take 100 mg by mouth At Bedtime    dolutegravir (TIVICAY) 50 MG tablet  Self Yes No   Sig: Take 50 mg by mouth At Bedtime   epoetin brittni (EPOGEN,PROCRIT) 55217 UNIT/ML injection  Self Yes No   Sig: Inject 11,000 Units Subcutaneous three times a week WITH DIALYSIS   gabapentin (NEURONTIN) 300 MG capsule  Self Yes No   Sig: Take 300 mg by mouth every morning    gabapentin (NEURONTIN) 300 MG capsule  Self Yes No   Sig: Take 600 mg by mouth every evening   imiquimod (ALDARA) 5 % cream  Self Yes No   Sig: Apply topically as needed   insulin  "glargine (LANTUS) 100 UNIT/ML injection   Yes No   Sig: Inject 50 Units Subcutaneous daily Takes about noon   iron sucrose (VENOFER) 20 MG/ML injection  Self Yes No   Sig: Inject 50 mg into the vein once a week WIth dialysis   losartan (COZAAR) 50 MG tablet   No No   Sig: Take 1 tablet (50 mg) by mouth every evening   mycophenolate (GENERIC EQUIVALENT) 500 MG tablet   No No   Sig: Take 1 tablet (500 mg) by mouth 2 times daily On an empty stomach   nitroglycerin (NITROSTAT) 0.4 MG SL tablet  Self No No   Sig: One tablet under the tongue every 5 minutes if needed for chest pain. May repeat every 5 minutes for a maximum of 3 doses in 15 minutes\"   omega-3 acid ethyl esters (LOVAZA) 1 G capsule   Yes No   Sig: Take 2 g by mouth 2 times daily   omeprazole (PRILOSEC) 40 MG capsule  Self Yes No   Sig: Take 40 mg by mouth daily 1 hour before dinner   order for DME  Self No No   Sig: Equipment being ordered: Other: 4WW  Treatment Diagnosis: Decreased activity tolerance   ritonavir (NORVIR) 100 MG capsule  Self Yes No   Sig: Take 1 capsule by mouth At Bedtime    terbinafine (LAMISIL AT) 1 % cream  Self Yes No   Sig: Apply topically 2 times daily as needed       Facility-Administered Medications: None     Allergies   Allergies   Allergen Reactions     Calcium Acetate Other (See Comments)     Other reaction(s): Other, see comments  Pain in chest and back  Pain in chest area (sensitive skin)      Diagnostic X-Ray Materials Other (See Comments)     PN: renal failure     Lisinopril      Sulfa Drugs        Social History   I have reviewed this patient's social history and updated it with pertinent information if needed. Donald SABINE Raza  reports that he quit smoking about 15 years ago. His smoking use included Cigarettes. He has a 82.00 pack-year smoking history. He has never used smokeless tobacco. He reports that he does not drink alcohol or use illicit drugs.    Family History   Family history assessed and, except as above, is " non-contributory.    Family History   Problem Relation Age of Onset     HEART DISEASE Brother 40     CABG     KIDNEY DISEASE Sister      Hypertension Sister      HEART DISEASE Brother      Dilated aorta       Review of Systems   The 10 point Review of Systems is negative other than noted in the HPI or here.      Physical Exam                      Vital Signs with Ranges  Temp:  [99  F (37.2  C)] 99  F (37.2  C)  Heart Rate:  [91] 91  Resp:  [18] 18  BP: (149-162)/(71-81) 149/71  SpO2:  [91 %-95 %] 91 %  0 lbs 0 oz    Constitutional: Alert and oriented to person, place and time; no apparent distress  HEENT: normocephalic moist mucus membranes  Respiratory: lungs have rales in the right mid and lower lung zones and sound more clear on the left side to auscultation   Cardiovascular: regular S1 S2   GI: abdomen soft non tender non distended bowel sounds positive  Lymph/Hematologic: pale, no cervical lymphadenopathy  Skin: no rash, good turgor  Musculoskeletal: no clubbing, cyanosis or edema  Neurologic: extra-ocular muscles intact; moves all four extremities  Psychiatric: appropriate affect, insight and judgment    Data   Data reviewed today:  I personally reviewed the chest x-ray image(s) showing right middle lobe infiltrate.    Recent Labs  Lab 02/01/18  0250   WBC 7.4   HGB 6.4*   MCV 94   PLT 90*      POTASSIUM 4.4   CHLORIDE 105   CO2 23   BUN 73*   CR 8.85*   ANIONGAP 9   PRABHA 8.7   *   ALBUMIN 3.3*   PROTTOTAL 7.1   BILITOTAL 0.5   ALKPHOS 121   ALT 20   AST 16   TROPI 0.022       Recent Results (from the past 24 hour(s))   XR Chest 2 Views    Narrative    XR CHEST 2 VIEWS   2/1/2018 3:27 AM     HISTORY: Cough.     COMPARISON: 1/19/2018.    FINDINGS: The heart size is normal. The thoracic aorta is calcified.  There is a new right infrahilar infiltrate and right basilar  infiltrate medially. The lungs are otherwise clear. No pneumothorax or  pleural effusion.      Impression    IMPRESSION: Right  infrahilar pneumonia.    LIGIA RIVERO MD

## 2018-02-01 NOTE — PROGRESS NOTES
Potassium   Date Value Ref Range Status   02/01/2018 4.4 3.4 - 5.3 mmol/L Final     Lab Results   Component Value Date    HGB 6.4 02/01/2018     Weight: 87.8 kg (193 lb 9.6 oz)  POST WT  DIALYSIS PROCEDURE NOTE  Hepatitis status of previous patient on machine log was checked and verified ok to use with this patients hepatitis status.  Patient dialyzed for 3.5 hrs. on a 3 K bath with a net fluid removal of  2.5L.  A BFR of 450 ml/min was obtained via a LUE AVF using 15 gauge needles.    The patient was seen by Dr. Marcial during the treatment.  Total heparin received during the treatment: 1700 units. Sites held x 10 min then  pressure drsgs applied.  Meds  Given: Epogen, Hectorol, 1 unit PRBCs. Complications: None.  Procedure and ESRD teaching done and questions answered. See flowsheet in EPIC for further details and post assessment.  Machine water alarm in place and functioning.  Pt returned via WC  Vascular Access: Aseptic prep done for both on/off.  Report received from: ADITYA Llamas RN  Report given to: SOY Causey RN  HEPATITIS B SURFACE ANTIGEN Non-Reactive DATE 1/21/18   HEPATITIS B SURFACE ANTIBODY Immune DATE 1/21/18  Chlorine/Chloramine water system checked every 4 hours.  Outpatient Dialysis at [facility]

## 2018-02-01 NOTE — PLAN OF CARE
Problem: Pneumonia (Adult)  Goal: Signs and Symptoms of Listed Potential Problems Will be Absent, Minimized or Managed (Pneumonia)  Signs and symptoms of listed potential problems will be absent, minimized or managed by discharge/transition of care (reference Pneumonia (Adult) CPG).  Outcome: No Change  A/o x4. VSS. 2LPM. Dialysis diet. Tolerating well. Insulin coverage given per protocol. Cough, non productive, coarse lung sounds.  Thrill/Bruit present dressing CDI. Dialysis done today. Up SBA A/1. Pt will go to inpatient Report Given to Swathi RN [address]. All medications sent with pt.

## 2018-02-01 NOTE — IP AVS SNAPSHOT
MRN:[id number]                      After Visit Summary   2/1/2018    Donald Raza    MRN: [id number]           Thank you!     Thank you for choosing [facility] for your care. Our goal is always to provide you with excellent care. Hearing back from our patients is one way we can continue to improve our services. Please take a few minutes to complete the written survey that you may receive in the mail after you visit with us. Thank you!        Patient Information     Date Of Birth          1950        About your hospital stay     You were admitted on:  February 1, 2018 You last received care in the:  [address] [facility]    You were discharged on:  February 4, 2018        Reason for your hospital stay       Postviral pneumonia                  Who to Call     For medical emergencies, please call 911.  For non-urgent questions about your medical care, please call your primary care provider or clinic, [phone]          Attending Provider     Provider Specialty    Chago Meadows MD Internal Medicine    Ragland, Eric Romero MD Internal Medicine       Primary Care Provider Office Phone # Fax #    Aida Thomas [phone]      After Care Instructions     Activity       Your activity upon discharge: activity as tolerated            Diet       Follow this diet upon discharge: Orders Placed This Encounter      Combination Diet Regular Diet Adult; Dialysis Diet; [id number] Calories: Moderate Consistent CHO (4-6 CHO units/meal)            Discharge Instructions       Please keep a close eye on your blood sugars while you are taking the prednisone. If you are having frequent blood sugar checks of > 250 or less than 80 please call your primary doctor for help in adjusting your insulin.     You need to see your doctor within 7 days to make sure your diabetes is well controlled and that                  Follow-up Appointments     Follow-up and  "recommended labs and tests        Follow up with primary care provider, Aida Thomas, within 7 days to evaluate medication change and for hospital follow- up.  Needs follow up cbc                  Pending Results     Date and Time Order Name Status Description    2018 0409 Blood culture ONE site Preliminary     2018 0353 Blood culture ONE site Preliminary             Statement of Approval     Ordered          18 1038  I have reviewed and agree with all the recommendations and orders detailed in this document.  EFFECTIVE NOW     Approved and electronically signed by:  Catrachito Rosado DO             Admission Information     Date & Time Provider Department Dept. Phone    2018 Eric Ragland MD [place] [address] [facility] [phone]      Your Vitals Were     Blood Pressure Pulse Temperature Respirations Height Weight    132/68 (BP Location: Right arm) 78 97.7  F (36.5  C) (Oral) 20 1.803 m (5' 11\") 87.8 kg (193 lb 9.6 oz)    Pulse Oximetry BMI (Body Mass Index)                92% 27 kg/m2          Diagnoplex Information     [employer]x lets you send messages to your doctor, view your test results, renew your prescriptions, schedule appointments and more. To sign up, go to [email or web address] . Click on \"Log in\" on the left side of the screen, which will take you to the Welcome page. Then click on \"Sign up Now\" on the right side of the page.     You will be asked to enter the access code listed below, as well as some personal information. Please follow the directions to create your username and password.     Your access code is: [email or web address]  Expires: 2018  1:13 PM     Your access code will  in 90 days. If you need help or a new code, please call your [facility] clinic or [phone].        Care EveryWhere ID     This is your Care EveryWhere ID. This could be used by other organizations to access your [facility] medical records  [id number]      "   Equal Access to Services     [facility]: Hadii aad ku hadtoshiachema Inocenciasue, waaxda luqadaha, qaybta kaalmada miteshdarrenlaci, fatoumata olivo. So [facility] [phone].    ATENCIÓN: Si habla español, tiene a ayala disposición servicios gratuitos de asistencia lingüística. Llame al [phone].    We comply with applicable federal civil rights laws and [place] laws. We do not discriminate on the basis of race, color, national origin, age, disability, sex, sexual orientation, or gender identity.               Review of your medicines      START taking        Dose / Directions    amoxicillin-clavulanate 500-125 MG per tablet   Commonly known as:  AUGMENTIN   Indication:  Community Acquired Pneumonia        Dose:  1 tablet   Take 1 tablet by mouth every evening   Quantity:  7 tablet   Refills:  0       azithromycin 250 MG tablet   Commonly known as:  ZITHROMAX   Indication:  Community Acquired Pneumonia        Dose:  250 mg   Start taking on:  2/5/2018   Take 1 tablet (250 mg) by mouth daily for 4 days   Quantity:  4 tablet   Refills:  0       ipratropium - albuterol 0.5 mg/2.5 mg/3 mL 0.5-2.5 (3) MG/3ML neb solution   Commonly known as:  DUONEB        Dose:  1 vial   Take 1 vial (3 mLs) by nebulization every 6 hours as needed for shortness of breath / dyspnea or wheezing   Quantity:  90 vial   Refills:  1       predniSONE 10 MG tablet   Commonly known as:  DELTASONE        4 tabs daily for 3 days, then 3 tabs daily for 3 days, then 2 tabs daily for 3 days, then 1 tab daily for 3 days, then stop   Quantity:  30 tablet   Refills:  0         CONTINUE these medicines which have NOT CHANGED        Dose / Directions    abacavir 300 MG tablet   Commonly known as:  ZIAGEN        Dose:  600 mg   Take 600 mg by mouth every evening   Refills:  0       albuterol 108 (90 BASE) MCG/ACT Inhaler   Commonly known as:  PROAIR HFA/PROVENTIL HFA/VENTOLIN HFA   Used for:  Cough        Dose:  2 puff   Inhale 2 puffs into  the lungs every 6 hours as needed for shortness of breath / dyspnea or wheezing   Quantity:  1 Inhaler   Refills:  1       AMLODIPINE BESYLATE PO        Dose:  5 mg   Take 5 mg by mouth daily   Refills:  0       atorvastatin 40 MG tablet   Commonly known as:  LIPITOR        Dose:  40 mg   Take 40 mg by mouth At Bedtime   Refills:  0       B COMPLEX-C-FOLIC ACID PO        Dose:  1 tablet   Take 1 tablet by mouth At Bedtime   Refills:  0       benzonatate 100 MG capsule   Commonly known as:  TESSALON   Used for:  RSV bronchiolitis        Dose:  100 mg   Take 1 capsule (100 mg) by mouth 3 times daily as needed for cough   Quantity:  42 capsule   Refills:  0       calcium carbonate 500 MG chewable tablet   Commonly known as:  TUMS        Dose:  3 chew tab   Take 3 chew tab by mouth 4 times daily   Refills:  0       chlorhexidine 0.12 % solution   Commonly known as:  PERIDEX        Rinse and gargle 15 ml by mouth twice a day as directed.   Refills:  0       CLONAZEPAM PO        Dose:  0.5 mg   Take 0.5 mg by mouth nightly as needed for anxiety (restless legs)   Refills:  0       CLOPIDOGREL BISULFATE PO        Dose:  75 mg   Take 75 mg by mouth every evening   Refills:  0       dapsone 100 MG tablet        Dose:  100 mg   Take 100 mg by mouth At Bedtime   Refills:  0       dolutegravir 50 MG tablet   Commonly known as:  TIVICAY        Dose:  50 mg   Take 50 mg by mouth At Bedtime   Refills:  0       DOXERCALCIFEROL IV        Dose:  6 mcg   Inject 6 mcg into the vein three times a week (with dialysis)   Refills:  0       epoetin brittni 88471 UNIT/ML injection   Commonly known as:  EPOGEN/PROCRIT        Dose:  59642 Units   Inject 11,000 Units Subcutaneous three times a week WITH DIALYSIS   Refills:  0       * gabapentin 300 MG capsule   Commonly known as:  NEURONTIN        Dose:  300 mg   Take 300 mg by mouth every morning   Refills:  0       * gabapentin 300 MG capsule   Commonly known as:  NEURONTIN        Dose:  600 mg    Take 600 mg by mouth every evening   Refills:  0       guaiFENesin 600 MG 12 hr tablet   Commonly known as:  MUCINEX        Take by mouth as needed for congestion   Refills:  0       HUMALOG PEN SC        Take 15 units TID and an additional 2 units if BG is over 150   Refills:  0       HYDRALAZINE HCL PO        Dose:  25 mg   Take 25 mg by mouth 2 times daily   Refills:  0       imiquimod 5 % cream   Commonly known as:  ALDARA        Apply topically as needed   Refills:  0       insulin glargine 100 UNIT/ML injection   Commonly known as:  LANTUS        Dose:  50 Units   Inject 50 Units Subcutaneous daily Takes about noon   Refills:  0       iron sucrose 20 MG/ML injection   Commonly known as:  VENOFER        Dose:  50 mg   Inject 50 mg into the vein once a week WIth dialysis   Refills:  0       Isosorbide Mononitrate  MG Tb24   Used for:  Coronary artery disease involving native heart, angina presence unspecified, unspecified vessel or lesion type, Hypertension secondary to other renal disorders        Dose:  120 mg   Take 1 tablet (120 mg) by mouth every evening   Quantity:  30 tablet   Refills:  11       lamISIL AT 1 % cream   Generic drug:  terbinafine        Apply topically 2 times daily as needed   Refills:  0       losartan 50 MG tablet   Commonly known as:  COZAAR   Used for:  Hypertension secondary to other renal disorders        Dose:  50 mg   Take 1 tablet (50 mg) by mouth every evening   Quantity:  30 tablet   Refills:  3       MAGNESIUM OXIDE PO        Dose:  400 mg   Take 400 mg by mouth At Bedtime   Refills:  0       mycophenolate 500 MG tablet   Commonly known as:  GENERIC EQUIVALENT   Used for:  ESRD (end stage renal disease) on dialysis (H)        Dose:  500 mg   Take 1 tablet (500 mg) by mouth 2 times daily On an empty stomach   Refills:  0       NEPRO PO        1-3 times daily   Refills:  0       nitroGLYcerin 0.4 MG sublingual tablet   Commonly known as:  NITROSTAT   Used for:  Coronary  "artery disease due to lipid rich plaque, Status post coronary angioplasty        One tablet under the tongue every 5 minutes if needed for chest pain. May repeat every 5 minutes for a maximum of 3 doses in 15 minutes\"   Quantity:  25 tablet   Refills:  3       omega-3 acid ethyl esters 1 G capsule   Commonly known as:  Lovaza        Dose:  2 g   Take 2 g by mouth 2 times daily   Refills:  0       order for DME   Used for:  SOB (shortness of breath), Generalized muscle weakness        Equipment being ordered: Other: 4WW Treatment Diagnosis: Decreased activity tolerance   Quantity:  1 each   Refills:  0       PREZISTA PO        Dose:  800 mg   Take 800 mg by mouth At Bedtime.   Refills:  0       priLOSEC 40 MG capsule   Generic drug:  omeprazole        Dose:  40 mg   Take 40 mg by mouth daily 1 hour before dinner   Refills:  0       ritonavir 100 MG capsule   Commonly known as:  NORVIR        Dose:  1 capsule   Take 1 capsule by mouth At Bedtime   Refills:  0       TYLENOL PO        Take by mouth as needed for mild pain or fever   Refills:  0       * Notice:  This list has 2 medication(s) that are the same as other medications prescribed for you. Read the directions carefully, and ask your doctor or other care provider to review them with you.         Where to get your medicines      These medications were sent to [facility] [place], [place] - [address]  [address] [facility] [address], [place] [address]     Phone:  [phone]     amoxicillin-clavulanate 500-125 MG per tablet    azithromycin 250 MG tablet    ipratropium - albuterol 0.5 mg/2.5 mg/3 mL 0.5-2.5 (3) MG/3ML neb solution    predniSONE 10 MG tablet                Protect others around you: Learn how to safely use, store and throw away your medicines at www.disposemymeds.org.        ANTIBIOTIC INSTRUCTION     You've Been Prescribed an Antibiotic - Now What?  Your healthcare team thinks that you or your loved one might have an infection. Some " infections can be treated with antibiotics, which are powerful, life-saving drugs. Like all medications, antibiotics have side effects and should only be used when necessary. There are some important things you should know about your antibiotic treatment.      Your healthcare team may run tests before you start taking an antibiotic.    Your team may take samples (e.g., from your blood, urine or other areas) to run tests to look for bacteria. These test can be important to determine if you need an antibiotic at all and, if you do, which antibiotic will work best.      Within a few days, your healthcare team might change or even stop your antibiotic.    Your team may start you on an antibiotic while they are working to find out what is making you sick.    Your team might change your antibiotic because test results show that a different antibiotic would be better to treat your infection.    In some cases, once your team has more information, they learn that you do not need an antibiotic at all. They may find out that you don't have an infection, or that the antibiotic you're taking won't work against your infection. For example, an infection caused by a virus can't be treated with antibiotics. Staying on an antibiotic when you don't need it is more likely to be harmful than helpful.      You may experience side effects from your antibiotic.    Like all medications, antibiotics have side effects. Some of these can be serious.    Let you healthcare team know if you have any known allergies when you are admitted to the hospital.    One significant side effect of nearly all antibiotics is the risk of severe and sometimes deadly diarrhea caused by Clostridium difficile (C. Difficile). This occurs when a person takes antibiotics because some good germs are destroyed. Antibiotic use allows C. diificile to take over, putting patients at high risk for this serious infection.    As a patient or caregiver, it is important to  understand your or your loved one's antibiotic treatment. It is especially important for caregivers to speak up when patients can't speak for themselves. Here are some important questions to ask your healthcare team.    What infection is this antibiotic treating and how do you know I have that infection?    What side effects might occur from this antibiotic?    How long will I need to take this antibiotic?    Is it safe to take this antibiotic with other medications or supplements (e.g., vitamins) that I am taking?     Are there any special directions I need to know about taking this antibiotic? For example, should I take it with food?    How will I be monitored to know whether my infection is responding to the antibiotic?    What tests may help to make sure the right antibiotic is prescribed for me?      Information provided by:  www.cdc.gov/getsmart  U.S. Department of Health and Human Services  Centers for disease Control and Prevention  National Center for Emerging and Zoonotic Infectious Diseases  Division of Healthcare Quality Promotion             Medication List: This is a list of all your medications and when to take them. Check marks below indicate your daily home schedule. Keep this list as a reference.      Medications           Morning Afternoon Evening Bedtime As Needed    abacavir 300 MG tablet   Commonly known as:  ZIAGEN   Take 600 mg by mouth every evening   Last time this was given:  600 mg on 2/3/2018  9:17 PM   Next Dose Due:  2/4/18                                   albuterol 108 (90 BASE) MCG/ACT Inhaler   Commonly known as:  PROAIR HFA/PROVENTIL HFA/VENTOLIN HFA   Inhale 2 puffs into the lungs every 6 hours as needed for shortness of breath / dyspnea or wheezing   Next Dose Due:  Resume as ordered                                AMLODIPINE BESYLATE PO   Take 5 mg by mouth daily   Last time this was given:  5 mg on 2/4/2018  8:30 AM   Next Dose Due:  2/5/18                                    amoxicillin-clavulanate 500-125 MG per tablet   Commonly known as:  AUGMENTIN   Take 1 tablet by mouth every evening   Last time this was given:  1 tablet on 2/4/2018  8:30 AM   Next Dose Due:  2/5/18                                   atorvastatin 40 MG tablet   Commonly known as:  LIPITOR   Take 40 mg by mouth At Bedtime   Last time this was given:  40 mg on 2/3/2018  9:16 PM   Next Dose Due:  2/5/18                                   azithromycin 250 MG tablet   Commonly known as:  ZITHROMAX   Take 1 tablet (250 mg) by mouth daily for 4 days   Start taking on:  2/5/2018   Last time this was given:  250 mg on 2/4/2018  8:30 AM   Next Dose Due:  2/5/18                                   B COMPLEX-C-FOLIC ACID PO   Take 1 tablet by mouth At Bedtime   Next Dose Due:  Resume as ordered                                   benzonatate 100 MG capsule   Commonly known as:  TESSALON   Take 1 capsule (100 mg) by mouth 3 times daily as needed for cough   Last time this was given:  100 mg on 2/3/2018  9:49 PM   Next Dose Due:  As needed for cough                                   calcium carbonate 500 MG chewable tablet   Commonly known as:  TUMS   Take 3 chew tab by mouth 4 times daily   Last time this was given:  500 mg on 2/4/2018  1:07 PM   Next Dose Due:  2/4/18                                   chlorhexidine 0.12 % solution   Commonly known as:  PERIDEX   Rinse and gargle 15 ml by mouth twice a day as directed.                                CLONAZEPAM PO   Take 0.5 mg by mouth nightly as needed for anxiety (restless legs)   Next Dose Due:  Resume as ordered                                CLOPIDOGREL BISULFATE PO   Take 75 mg by mouth every evening   Last time this was given:  75 mg on 2/3/2018  9:17 PM   Next Dose Due:  2/4/18                                   dapsone 100 MG tablet   Take 100 mg by mouth At Bedtime   Last time this was given:  100 mg on 2/3/2018  9:17 PM   Next Dose Due:  02/4/18                                    dolutegravir 50 MG tablet   Commonly known as:  TIVICAY   Take 50 mg by mouth At Bedtime   Last time this was given:  50 mg on 2/3/2018  9:17 PM   Next Dose Due:  2/04/18                                   DOXERCALCIFEROL IV   Inject 6 mcg into the vein three times a week (with dialysis)   Last time this was given:  2 mcg on 2/3/2018  8:42 AM   Next Dose Due:  Three times per weed with dialysis                                epoetin brittni 32298 UNIT/ML injection   Commonly known as:  EPOGEN/PROCRIT   Inject 11,000 Units Subcutaneous three times a week WITH DIALYSIS   Last time this was given:  10,000 Units on 2/3/2018  9:17 AM   Next Dose Due:  With dialyis                                * gabapentin 300 MG capsule   Commonly known as:  NEURONTIN   Take 300 mg by mouth every morning   Last time this was given:  300 mg on 2/4/2018  8:30 AM   Next Dose Due:  2/5/18                                   * gabapentin 300 MG capsule   Commonly known as:  NEURONTIN   Take 600 mg by mouth every evening   Last time this was given:  300 mg on 2/4/2018  8:30 AM   Next Dose Due:  2/4/18                                   guaiFENesin 600 MG 12 hr tablet   Commonly known as:  MUCINEX   Take by mouth as needed for congestion   Next Dose Due:  Resume as ordered                                HUMALOG PEN SC   Take 15 units TID and an additional 2 units if BG is over 150   Next Dose Due:  Resume as ordered                                HYDRALAZINE HCL PO   Take 25 mg by mouth 2 times daily   Last time this was given:  25 mg on 2/4/2018  8:30 AM   Next Dose Due:  2/4/18                                   imiquimod 5 % cream   Commonly known as:  ALDARA   Apply topically as needed   Next Dose Due:  Resume as ordered                                insulin glargine 100 UNIT/ML injection   Commonly known as:  LANTUS   Inject 50 Units Subcutaneous daily Takes about noon   Last time this was given:  50 Units on 2/4/2018  1:06 PM   Next Dose  "Due:  2/5/18                                   ipratropium - albuterol 0.5 mg/2.5 mg/3 mL 0.5-2.5 (3) MG/3ML neb solution   Commonly known as:  DUONEB   Take 1 vial (3 mLs) by nebulization every 6 hours as needed for shortness of breath / dyspnea or wheezing   Last time this was given:  3 mLs on 2/4/2018 12:37 PM   Next Dose Due:  After 6 pm                                   iron sucrose 20 MG/ML injection   Commonly known as:  VENOFER   Inject 50 mg into the vein once a week WIth dialysis   Next Dose Due:  Resume as ordered                                Isosorbide Mononitrate  MG Tb24   Take 1 tablet (120 mg) by mouth every evening   Last time this was given:  120 mg on 2/3/2018  9:18 PM   Next Dose Due:  2/4/18                                   lamISIL AT 1 % cream   Apply topically 2 times daily as needed   Generic drug:  terbinafine   Next Dose Due:  Resume as ordered                                losartan 50 MG tablet   Commonly known as:  COZAAR   Take 1 tablet (50 mg) by mouth every evening   Last time this was given:  50 mg on 2/3/2018  9:17 PM   Next Dose Due:  2/4/18                                   MAGNESIUM OXIDE PO   Take 400 mg by mouth At Bedtime   Next Dose Due:  Resume as ordered                                mycophenolate 500 MG tablet   Commonly known as:  GENERIC EQUIVALENT   Take 1 tablet (500 mg) by mouth 2 times daily On an empty stomach   Next Dose Due:  Resume as ordered                                NEPRO PO   1-3 times daily   Next Dose Due:  Resume as ordered                                nitroGLYcerin 0.4 MG sublingual tablet   Commonly known as:  NITROSTAT   One tablet under the tongue every 5 minutes if needed for chest pain. May repeat every 5 minutes for a maximum of 3 doses in 15 minutes\"   Next Dose Due:  Resume as ordered                                omega-3 acid ethyl esters 1 G capsule   Commonly known as:  Lovaza   Take 2 g by mouth 2 times daily   Next Dose Due:  " Resume as ordered                                order for DME   Equipment being ordered: Other: 4WW Treatment Diagnosis: Decreased activity tolerance   Next Dose Due:  Resume as ordered                                predniSONE 10 MG tablet   Commonly known as:  DELTASONE   4 tabs daily for 3 days, then 3 tabs daily for 3 days, then 2 tabs daily for 3 days, then 1 tab daily for 3 days, then stop   Last time this was given:  40 mg on 2/2/2018  8:01 AM   Next Dose Due:  Resume as ordered                                PREZISTA PO   Take 800 mg by mouth At Bedtime.   Last time this was given:  800 mg on 2/3/2018  9:17 PM   Next Dose Due:  2/4/18                                   priLOSEC 40 MG capsule   Take 40 mg by mouth daily 1 hour before dinner   Generic drug:  omeprazole   Next Dose Due:  Resume as ordered                                ritonavir 100 MG capsule   Commonly known as:  NORVIR   Take 1 capsule by mouth At Bedtime   Next Dose Due:  Resume as ordered                                TYLENOL PO   Take by mouth as needed for mild pain or fever   Last time this was given:  650 mg on 2/3/2018  9:49 PM   Next Dose Due:  As needed                                   * Notice:  This list has 2 medication(s) that are the same as other medications prescribed for you. Read the directions carefully, and ask your doctor or other care provider to review them with you.              More Information        Pneumonia (Adult)  Pneumonia is an infection deep within the lungs. It is in the small air sacs (alveoli). Pneumonia may be caused by a virus or bacteria. Pneumonia caused by bacteria is usually treated with an antibiotic. Severe cases may need to be treated in the hospital. Milder cases can be treated at home. Symptoms usually start to get better during the first 2 days of treatment.    Home care  Follow these guidelines when caring for yourself at home:    Rest at home for the first 2 to 3 days, or until you feel  stronger. Don t let yourself get overly tired when you go back to your activities.    Stay away from cigarette smoke - yours or other people s.    You may use acetaminophen or ibuprofen to control fever or pain, unless another medicine was prescribed. If you have chronic liver or kidney disease, talk with your healthcare provider before using these medicines. Also talk with your provider if you ve had a stomach ulcer or gastrointestinal bleeding. Don t give aspirin to anyone younger than 18 years of age who is ill with a fever. It may cause severe liver damage.    Your appetite may be poor, so a light diet is fine.    Drink 6 to 8 glasses of fluids every day to make sure you are getting enough fluids. Beverages can include water, sport drinks, sodas without caffeine, juices, tea, or soup. Fluids will help loosen secretions in the lung. This will make it easier for you to cough up the phlegm (sputum). If you also have heart or kidney disease, check with your healthcare provider before you drink extra fluids.    Take antibiotic medicine prescribed until it is all gone, even if you are feeling better after a few days.  Follow-up care  Follow up with your healthcare provider in the next 2 to 3 days, or as advised. This is to be sure the medicine is helping you get better.  If you are 65 or older, you should get a pneumococcal vaccine and a yearly flu (influenza) shot. You should also get these vaccines if you have chronic lung disease like asthma, emphysema, or COPD. Recently, a second type of pneumonia vaccine has become available for everyone over 65 years old. This is in addition to the previous vaccine. Ask your provider about this.  When to seek medical advice  Call your healthcare provider right away if any of these occur:    You don t get better within the first 48 hours of treatment    Shortness of breath gets worse    Rapid breathing (more than 25 breaths per minute)    Coughing up blood    Chest pain gets  worse with breathing    Fever of 100.4 F (38 C) or higher that doesn t get better with fever medicine    Weakness, dizziness, or fainting that gets worse    Thirst or dry mouth that gets worse    Sinus pain, headache, or a stiff neck    Chest pain not caused by coughing  Date Last Reviewed: 1/1/2017 2000-2017 [employer]. [address] [place], [place] [address]. All rights reserved. This information is not intended as a substitute for professional medical care. Always follow your healthcare professional's instructions.

## 2018-02-01 NOTE — PHARMACY-ADMISSION MEDICATION HISTORY
Admission medication history interview status for the 2/1/2018  admission is complete. See EPIC admission navigator for prior to admission medications     Medication history source reliability:Good    Actions taken by pharmacist (provider contacted, etc): spoke to pt     Additional medication history information not noted on PTA med list : pt NOT currently taking mycophenolate.    Medication reconciliation/reorder completed by provider prior to medication history? Yes    Time spent in this activity: 20 minutes    Prior to Admission medications    Medication Sig Last Dose Taking? Auth Provider   Acetaminophen (TYLENOL PO) Take by mouth as needed for mild pain or fever prn med Yes Unknown, Entered By History   guaiFENesin (MUCINEX) 600 MG 12 hr tablet Take by mouth as needed for congestion prn med Yes Unknown, Entered By History   benzonatate (TESSALON) 100 MG capsule Take 1 capsule (100 mg) by mouth 3 times daily as needed for cough prn med Yes Ezra Fortune MD   omega-3 acid ethyl esters (LOVAZA) 1 G capsule Take 2 g by mouth 2 times daily 1/31/2018 at x2 Yes Unknown, Entered By History   losartan (COZAAR) 50 MG tablet Take 1 tablet (50 mg) by mouth every evening 1/31/2018 at Unknown time Yes Yuki Hinojosa APRN CNP   Isosorbide Mononitrate  MG TB24 Take 1 tablet (120 mg) by mouth every evening 1/31/2018 at Unknown time Yes Yuki Hinojosa APRN CNP   insulin glargine (LANTUS) 100 UNIT/ML injection Inject 50 Units Subcutaneous daily Takes about noon 1/31/2018 at Unknown time Yes Unknown, Entered By History   HYDRALAZINE HCL PO Take 25 mg by mouth 2 times daily 1/31/2018 at x2 Yes Unknown, Entered By History   AMLODIPINE BESYLATE PO Take 5 mg by mouth daily  1/31/2018 at Unknown time Yes Unknown, Entered By History   Insulin Lispro (HUMALOG PEN SC) Take 15 units TID and an additional 2 units if BG is over 150 1/31/2018 at x3 Yes Unknown, Entered By History   calcium carbonate (TUMS) 500 MG chewable  tablet Take 3 chew tab by mouth 4 times daily  1/31/2018 at x4 Yes Unknown, Entered By History   B COMPLEX-C-FOLIC ACID PO Take 1 tablet by mouth At Bedtime  1/31/2018 at Unknown time Yes Reported, Patient   albuterol (PROAIR HFA/PROVENTIL HFA/VENTOLIN HFA) 108 (90 BASE) MCG/ACT Inhaler Inhale 2 puffs into the lungs every 6 hours as needed for shortness of breath / dyspnea or wheezing prn med Yes Nelson Worthy MD   Nutritional Supplements (NEPRO PO) 1-3 times daily  Yes Unknown, Entered By History   omeprazole (PRILOSEC) 40 MG capsule Take 40 mg by mouth daily 1 hour before dinner 1/31/2018 at Unknown time Yes Unknown, Entered By History   gabapentin (NEURONTIN) 300 MG capsule Take 600 mg by mouth every evening 1/31/2018 at Unknown time Yes Unknown, Entered By History   imiquimod (ALDARA) 5 % cream Apply topically as needed prn med Yes Reported, Patient   DOXERCALCIFEROL IV Inject 6 mcg into the vein three times a week (with dialysis)  Yes Reported, Patient   CLONAZEPAM PO Take 0.5 mg by mouth nightly as needed for anxiety (restless legs) prn med Yes Unknown, Entered By History   CLOPIDOGREL BISULFATE PO Take 75 mg by mouth every evening  1/31/2018 at Unknown time Yes Unknown, Entered By History   abacavir (ZIAGEN) 300 MG tablet Take 600 mg by mouth every evening  1/31/2018 at Unknown time Yes Abstract, Provider   chlorhexidine (CHLORHEXIDINE) 0.12 % solution Rinse and gargle 15 ml by mouth twice a day as directed. 1/31/2018 at x2 Yes Abstract, Provider   terbinafine (LAMISIL AT) 1 % cream Apply topically 2 times daily as needed  prn med Yes Abstract, Provider   atorvastatin (LIPITOR) 40 MG tablet Take 40 mg by mouth At Bedtime 1/31/2018 at Unknown time Yes Unknown, Entered By History   epoetin brittni (EPOGEN,PROCRIT) 55972 UNIT/ML injection Inject 11,000 Units Subcutaneous three times a week WITH DIALYSIS  Yes Unknown, Entered By History   iron sucrose (VENOFER) 20 MG/ML injection Inject 50 mg into the vein  "once a week WIth dialysis  Yes Unknown, Entered By History   MAGNESIUM OXIDE PO Take 400 mg by mouth At Bedtime 1/31/2018 at Unknown time Yes Unknown, Entered By History   dolutegravir (TIVICAY) 50 MG tablet Take 50 mg by mouth At Bedtime 1/31/2018 at Unknown time Yes Unknown, Entered By History   nitroglycerin (NITROSTAT) 0.4 MG SL tablet One tablet under the tongue every 5 minutes if needed for chest pain. May repeat every 5 minutes for a maximum of 3 doses in 15 minutes\" prn med Yes Lay Paz MD   gabapentin (NEURONTIN) 300 MG capsule Take 300 mg by mouth every morning  1/31/2018 at Unknown time Yes Unknown, Entered By History   dapsone 100 MG tablet Take 100 mg by mouth At Bedtime  1/31/2018 at Unknown time Yes Reported, Patient   Darunavir Ethanolate (PREZISTA PO) Take 800 mg by mouth At Bedtime. 1/31/2018 at Unknown time Yes Reported, Patient   ritonavir (NORVIR) 100 MG capsule Take 1 capsule by mouth At Bedtime  1/31/2018 at Unknown time Yes Reported, Patient   mycophenolate (GENERIC EQUIVALENT) 500 MG tablet Take 1 tablet (500 mg) by mouth 2 times daily On an empty stomach   Sherice Bernardo MD   order for DME Equipment being ordered: Other: 4WW  Treatment Diagnosis: Decreased activity tolerance   Lorna Jhaveri MD Beth Mulder, PharmD      "

## 2018-02-01 NOTE — IP AVS SNAPSHOT
[address] [facility]    [address] [place] [address]    Phone:  [phone]                                       After Visit Summary   2/1/2018    Donald Raza    MRN: [id number]           After Visit Summary Signature Page     I have received my discharge instructions, and my questions have been answered. I have discussed any challenges I see with this plan with the nurse or doctor.    ..........................................................................................................................................  Patient/Patient Representative Signature      ..........................................................................................................................................  Patient Representative Print Name and Relationship to Patient    ..................................................               ................................................  Date                                            Time    ..........................................................................................................................................  Reviewed by Signature/Title    ...................................................              ..............................................  Date                                                            Time

## 2018-02-01 NOTE — ED PROVIDER NOTES
History     Chief Complaint:  Cough     HPI   Donald Raza is a 67-year-old male with complicated past medical history notable for HIV who presents for evaluation of cough.  The patient was seen last week for a cough and was admitted for RSV bronchiolitis.  The patient was subsequently discharged and he states that he felt better, however, he states that for the last 2-3 days he has had increasing cough again.  He remarks that the cough is worse at night and has been preventing him from sleeping.  He states he feels short of breath.  He adds that he has had recent fevers which have been controlled with Tylenol.  His last dialysis was 1/30.    Allergies:  Calcium Acetate  Diagn[employer] X[employer] Materials  Lisinopril  Sulfa Drugs     Medications:    Benzonatate  Cozaar  Insulin glargine  Hydralazine  Mycophenolate  Amlodipine  Insulin lispro  Omeprazole  Gabapentin  Clonazepam  Clopidogrel bisulfate  Ziagen  Lipitor  Epogen  Venofer  Tivicay  Norvir    Past Medical History:    Acute coronary syndrome   Acquired immunodeficiency syndrome   Huang disease   Bradycardia   Chronic kidney disease   Coronary artery disease   Diabetes, type 2   Dilated aortic root   End-stage renal disease   Hemodialysis-associated hypotension   Human immunodeficiency virus  Hyperlipidemia   Increased PSA   NSTEMI   Pulmonary hypertension   TIA   Unstable angina     Past Surgical History:    Coronary stenting, multiple  Appendectomy  Cholecystectomy  Colostomy  Angioplasty    Family History:    CABG--brother   Kidney disease--sister   Hypertension--sister   Dilated aorta--brother     Social History:  Marital Status:    Presents to the ED with friend   Tobacco Use: Former smoker, 82 pack year history, quit 2003.   Alcohol Use: No   PCP: Aida Thomas (Infectious disease)     Review of Systems   Constitutional: Positive for fever (controlled with Tylenol).   Respiratory: Positive for cough (worse at night) and shortness of breath.     Psychiatric/Behavioral: Positive for sleep disturbance (due to cough).   All other systems reviewed and are negative.    Physical Exam     Patient Vitals for the past 24 hrs:   BP Temp Heart Rate Resp SpO2 Weight   02/01/18 0315 - - - - 93 % -   02/01/18 0300 - - - - 94 % -   02/01/18 0245 - - - - 95 % -   02/01/18 0229 162/81 99  F (37.2  C) 91 18 95 % 90.3 kg (199 lb 1.2 oz)        Physical Exam  Vital signs and nursing notes reviewed.     Constitutional: Patient does not appear to be in any acute distress.  HENT: Oropharynx is clear and moist  Eyes: Conjunctivae are normal bilaterally. Pupils equal  Neck: normal range of motion  Cardiovascular: Normal rate, regular rhythm, normal heart sounds.   Pulmonary/Chest: Fairly frequent congested sounding cough.  No significant wheezing or rales noted. Effort normal and breath sounds normal. No respiratory distress.   Abdominal: Soft. No distension. Bowel sounds are normal. No tenderness to palpation. No rebound or guarding.   Musculoskeletal: No significant peripheral edema.  Neurological: Alert and oriented. No focal weakness. No focal neurologic findings.  No confusion.  Skin: Skin is warm and dry. No rash noted. Pallor  Psych: normal affect     Emergency Department Course   ECG:  @ 0310  Indication: Shortness of breath   Vent. Rate 74 bpm. TN interval 164 ms. QRS duration 88 ms. QT/QTc 380/421 ms. P-R-T axis -11 18 63.   Normal sinus rhythm. Normal ECG.   Read @ 0312 by Dr. Michaud.     Imaging:  XR Chest 2 Views  Right infrahilar pneumonia.     Radiographic findings were communicated with the patient and Admitting MD who voiced understanding of the findings.    Laboratory:  Blood:  CBC:  WBC 7.4, HGB 6.4, PLT 90, otherwise WNL   BMP: Glc 225, BUN 73, Creatinine 8.85, GFR 6, otherwise WNL    Hepatic panel: Bili Direct 0.2, Bili Total 0.5, Albumin 3.3, Protein Total 7.1, Alkphos 121, ALT 20, AST 16.  Pro-BNP: 74592   Troponin I: 0.022   Blood Culture: Pending ×2.       Interventions:  (0326) DuoNeb, 3 mL, nebulizer    (0454) Levaquin infusion, 500 mg, IV      Emergency Department Course:  Nursing notes and vitals reviewed.    (0245) I entered the room with my scribe, obtained the history, and performed an exam of the patient as documented above.    EKG was done, interpretation as above.     A peripheral IV was established. Blood was drawn from the patient. This was sent for laboratory testing, findings above.      The patient was sent for a chest x-ray while in the emergency department, findings above.       Findings and plan explained to the patient. Patient will be transferred to [facility] via EMS.    (0327) I discussed the case with Dr. Meadows, who will admit the patient to a monitored bed for further monitoring, evaluation, and treatment.      Impression & Plan    Medical Decision Making:  Donald Raza is a 67-year-old male who presents with increased cough over the last 2 days.  He also has felt generally weak for over 48 hours.  He was recently admitted to the hospital for a respiratory illness due to RSV.  He has normal vital signs without significant hypoxia here in the emergency department.  He does have a fairly consistent congested cough.  He has no significant rales to suggest pulmonary edema, or wheezing.  Chest x-ray showed findings of a new right middle infiltrate.  He has no definable fever or elevated white blood cell count, therefore, it is unclear whether this is related to a viral pneumonia.  However, he was recently hospitalized so felt that antibiotic coverage should be initiated.  Blood cultures were obtained and  we started Levaquin.  There is no evidence of overt pulmonary edema, but may be a contributory factor.   He has a significant anemia at 6.4 which is declined from 8.1 last week.  This is acute on chronic anemia and he has required transfusions in the past.  He has no indication of acute bleeding.  Based on his needing  hemodialysis I did not feel that acute transfusion was warranted at this time essentially as he is vitally stable about this would best be done with his next Dialysis run.  Unfortunately, we have no inpatient hospital beds available here at [facility] therefore I contacted Dr. Meadows from the hospitalist service at [facility] who graciously agreed to accept the patient and agrees with the current plan of therapy.  I reviewed the results of the patient and the reasons for transferring and he understands and is in agreement.    Critical Care time:  none    Diagnosis:    ICD-10-CM   1. Severe anemia D64.9   2. Dyspnea, unspecified type R06.00   3. ESRD (end stage renal disease) on dialysis (H) N18.6    Z99.2     Disposition:  Transferred via EMS to [facility] EMERGENCY DEPARTMENT      Scribe disclosure:  I, Santiago Bhandari, am serving as a scribe on 2/1/2018 at 2:45 AM to personally document services performed by Dr. Michaud based on my observations and the provider's statements to me.              Jose Michaud MD  02/01/18 0514

## 2018-02-01 NOTE — PROGRESS NOTES
Pt seen.     Still with upper expiratory wheezes, course rhonchi at bases   Feels improved compared to when he first came in   Plan for dialysis today, hoping to receive transfusion during this. Coordinating this.     [facility]    Hospitalist Progress Note    Date of Service (when I saw the patient): 02/01/2018    Assessment & Plan   Donald Raza is a markedly pleasant 67 year old gentleman with AIDS, ESRD on HD, chronic anemia, CAD s/p stent placement (2015), asthma, Type 2 Diabetes mellitus causing neuropathy, prior history of colon cancer, gout, hypertension, and dyslipidemia, recently diagnosed with acute RSV bronchiolitis who presented to the ED 2/1 with progressive cough, work-up revealing right middle lobe pneumonia and acute anemia. Vitals currently WNL. Pt currently stable.      Acute hypoxic respiratory failure in the setting of post-viral right middle lobe pneumonia: [facility] from 1/19-1/21, diagnosed with RSV and now he returns for progression of cough. He is afebrile in the [facility] ED with stable vital signs. WBC 7.4, HGB 6.4 down from chronic 8, PLT 90 k (near or just below his baseline thrombocytopenia). BNP is markedly elevated. Troponin is 0.022. CMP shows ESRD. CXR shows a right middle lobe infiltrate not seen on last week's CXR. Blood cultures were sent and he was given Levaquin.  Overall we suspect post-viral pneumonia; there are no signs of SIRS. Elevated BNP likely due to ESRD; there are no signs of hypervolemia. Episode of hypoxia this AM, now utilizing supplemental oxygen via NC at 2 LPM.   -- Transfer to inpatient status due to need for ongoing IV antibiotics, contacted by [facility]    -- Ceftriaxone and Azithromycin ordered; goal to transition to Cefpodoxime and Azithromycin  -- Follow up blood and sputum cultures  -- Prednisone 40 mg for 5 days  -- Duonebs q4 hours while awake, encourage pulmonary toilet, RCAT consulted, incentive spirometer and acapella ordered     -- Tessalon, Robitussin     Acute on chronic anemia due to ESRD: He gets intermittent blood transfusions with dialysis and the last time his counts dropped this low seems to have been the end of December 2017. He does take Plavix. There are no clinical signs of bleeding thus far. Followed by Kathy of Nephrology through [facility].   -- Nephrology consulted, appreciate assistance, dialysis run scheduled 2/1   -- Consent signed for transfusion, in chart   -- Order for 1 U PRBCs to be administered during dialysis      AIDS: Last CD4 9/2017 was 149; he takes Dapsone for PCP prophylaxis due to a sulfa allergy. Followed by Dr. Thomas of [facility].   [PTA regimen consists of abacavir, darunavir, tivicay, ritonavir]  -- Resume Dapsone and ART above      CAD: Status post prior stents (stent to diagonal (2016) and mid LAD (2015), followed by Dr. Diaz of [facility]  -- Continue Amlodipine, Statin, Plavix, Hydralazine, Imdur, Losartan. Parameters in place.      Type 2 Diabetes mellitus, insulin dependent, controlled: We expect hyperglycemia with the Prednisone. A1C 4.9 on 9/20/17. Followed by Dr. Guillen through [facility].  -- Lantus 50 U SQ at noon   -- Humalog 15 TID   -- High intensity SSI ordered   -- Gabapentin for neuropathy   -- Monitor for hypoglycemia     Recent Labs  Lab 02/01/18  0759 02/01/18  0612 02/01/18  0250   GLC  --   --  225*   * 175*  --      DVT Prophylaxis: Ambulate   Code Status: Full Code    Disposition: Expected discharge pending improvement in hemoglobin, ability to ween off oxygen, and transition to oral antibiotics     Basia Carter PA-C    This patient was discussed with Dr. Ragland of the Hospitalist Service who agrees with current plans as outlined above.     Interval History   No acute events reported since admission. One episode of hypoxia requiring supplemental oxygen at 2 LPM via NC. Clinically is improving from when he first arrived. Denies CP, abdominal  pain, nausea, vomiting, dizziness, lightheadedness. Afebrile. Dialysis today.     -Data reviewed today: See below     Physical Exam   Temp: 98.1  F (36.7  C) Temp src: Axillary BP: 168/83 Pulse: 76 Heart Rate: 88 Resp: 16 SpO2: 96 % O2 Device: Nasal cannula Oxygen Delivery: 2 LPM  Vitals:    02/01/18 0545   Weight: 87.8 kg (193 lb 9.6 oz)     Vital Signs with Ranges  Temp:  [98  F (36.7  C)-99  F (37.2  C)] 98.1  F (36.7  C)  Pulse:  [76] 76  Heart Rate:  [74-91] 88  Resp:  [16-18] 16  BP: (134-168)/(63-84) 168/83  SpO2:  [88 %-96 %] 96 %     CONSTITUTIONAL: Pt laying in bed, dressed in hospital garb. Appears comfortable. Cooperative with interview.   HEENT: Normocephalic, atraumatic. Negative for conjunctival redness or scleral icterus.    CARDIOVASCULAR: RRR, no murmurs, rubs, or extra heart sounds appreciated. Pulses +2/4 and regular in upper and lower extremities, bilaterally.   RESPIRATORY: No increased work of breathing.  Supplemental oxygenation via NC at 2 LPM. Upper expiratory wheezes appreciated, course rhonchi at bases.   GASTROINTESTINAL:  Abdomen soft, non-distended. BS auscultated in all four quadrants. Negative for tenderness to percussion or light and deep palpation.  No masses or organomegaly noted.  MUSCULOSKELETAL: No gross deformities noted. Normal muscle tone.   HEMATOLOGIC/LYMPHATIC/IMMUNOLOGIC: Negative for lower extremity edema, bilaterally.  NEUROLOGIC: Alert and oriented to person, place, and time.  strength intact. No focal neuro deficits appreciated.   SKIN: Warm, dry, intact. No jaundice noted. Negative for suspicious lesions, rashes, bruising, open sores or abrasions.     Medications     heparin (porcine) 500 Units/hr (02/01/18 1150)     - MEDICATION INSTRUCTIONS -         sodium chloride (PF)  3 mL Intracatheter Q8H     cefTRIAXone  2 g Intravenous Q24H     [START ON 2/2/2018] azithromycin  250 mg Intravenous Q24H     predniSONE  40 mg Oral Daily     insulin aspart  1-10 Units  Subcutaneous TID AC     insulin aspart  1-7 Units Subcutaneous At Bedtime     - MEDICATION INSTRUCTIONS for Dialysis Patients -   Does not apply See Admin Instructions     abacavir  600 mg Oral QPM     amLODIPine (NORVASC) tablet 5 mg  5 mg Oral Daily     atorvastatin  40 mg Oral At Bedtime     clopidogrel (PLAVIX) tablet 75 mg  75 mg Oral QPM     dapsone  100 mg Oral At Bedtime     darunavir  800 mg Oral At Bedtime     dolutegravir  50 mg Oral At Bedtime     gabapentin  300 mg Oral QAM     gabapentin  600 mg Oral QPM     hydrALAZINE (APRESOLINE) tablet 25 mg  25 mg Oral BID     isosorbide mononitrate  120 mg Oral QPM     losartan  50 mg Oral QPM     ritonavir  100 mg Oral At Bedtime     insulin glargine  50 Units Subcutaneous Daily     insulin aspart  15 Units Subcutaneous TID w/meals     ipratropium - albuterol 0.5 mg/2.5 mg/3 mL  3 mL Nebulization Q4H While awake       Data     Recent Labs  Lab 02/01/18  0250   WBC 7.4   HGB 6.4*   MCV 94   PLT 90*      POTASSIUM 4.4   CHLORIDE 105   CO2 23   BUN 73*   CR 8.85*   ANIONGAP 9   PRABHA 8.7   *   ALBUMIN 3.3*   PROTTOTAL 7.1   BILITOTAL 0.5   ALKPHOS 121   ALT 20   AST 16   TROPI 0.022       Recent Results (from the past 24 hour(s))   XR Chest 2 Views    Narrative    XR CHEST 2 VIEWS   2/1/2018 3:27 AM     HISTORY: Cough.     COMPARISON: 1/19/2018.    FINDINGS: The heart size is normal. The thoracic aorta is calcified.  There is a new right infrahilar infiltrate and right basilar  infiltrate medially. The lungs are otherwise clear. No pneumothorax or  pleural effusion.      Impression    IMPRESSION: Right infrahilar pneumonia.    LIGIA RIVERO MD

## 2018-02-01 NOTE — PROGRESS NOTES
Dyspnea improved and oxygen saturations greater than 88% on room air or prior home oxygen levels   - Tolerates oral antibiotics or has plans for home infusion set up. - NOT MET  - Vital signs normal or at patient baseline NOT MET  - Infection is improving NOT MET  - Return to baseline functional status NOT MET  - Safe disposition plan has been identified NOT MET  - Nurse to notify provider when observation goals have been met and patient is ready for discharge.    Pt will go inpatient.     Laura Causey RN BSN

## 2018-02-01 NOTE — PLAN OF CARE
Problem: Patient Care Overview  Goal: Plan of Care/Patient Progress Review  Outcome: No Change   Observation goals PRIOR TO DISCHARGE     Comments: List all goals to be met before discharge home:   - Dyspnea improved and oxygen saturations greater than 88% on room air or prior home oxygen levels - partially met, O2 90-92% on RA, pt reports still SOB at times, placed on 2LPM O2 for comfort   - Tolerates oral antibiotics or has plans for home infusion set up. - partially met, home infusion pending  - Vital signs normal or at patient baseline - partially met, afebrile this shift  - Infection is improving - not met  - Return to baseline functional status - not met  - Safe disposition plan has been identified - not met    - Nurse to notify provider when observation goals have been met and patient is ready for discharge.

## 2018-02-02 PROBLEM — Z71.89 ACP (ADVANCE CARE PLANNING): Chronic | Status: ACTIVE | Noted: 2018-02-02

## 2018-02-02 LAB
ANION GAP SERPL CALCULATED.3IONS-SCNC: 8 MMOL/L (ref 3–14)
BASOPHILS # BLD AUTO: 0 10E9/L (ref 0–0.2)
BASOPHILS NFR BLD AUTO: 0.2 %
BUN SERPL-MCNC: 46 MG/DL (ref 7–30)
CALCIUM SERPL-MCNC: 8.5 MG/DL (ref 8.5–10.1)
CHLORIDE SERPL-SCNC: 99 MMOL/L (ref 94–109)
CO2 SERPL-SCNC: 29 MMOL/L (ref 20–32)
CREAT SERPL-MCNC: 6.26 MG/DL (ref 0.66–1.25)
DIFFERENTIAL METHOD BLD: ABNORMAL
EOSINOPHIL # BLD AUTO: 0 10E9/L (ref 0–0.7)
EOSINOPHIL NFR BLD AUTO: 0.2 %
ERYTHROCYTE [DISTWIDTH] IN BLOOD BY AUTOMATED COUNT: 17.2 % (ref 10–15)
GFR SERPL CREATININE-BSD FRML MDRD: 9 ML/MIN/1.7M2
GLUCOSE BLDC GLUCOMTR-MCNC: 167 MG/DL (ref 70–99)
GLUCOSE BLDC GLUCOMTR-MCNC: 201 MG/DL (ref 70–99)
GLUCOSE BLDC GLUCOMTR-MCNC: 216 MG/DL (ref 70–99)
GLUCOSE BLDC GLUCOMTR-MCNC: 228 MG/DL (ref 70–99)
GLUCOSE BLDC GLUCOMTR-MCNC: 252 MG/DL (ref 70–99)
GLUCOSE BLDC GLUCOMTR-MCNC: 253 MG/DL (ref 70–99)
GLUCOSE SERPL-MCNC: 238 MG/DL (ref 70–99)
HCT VFR BLD AUTO: 22.8 % (ref 40–53)
HGB BLD-MCNC: 7.3 G/DL (ref 13.3–17.7)
HGB BLD-MCNC: 7.6 G/DL (ref 13.3–17.7)
IMM GRANULOCYTES # BLD: 0 10E9/L (ref 0–0.4)
IMM GRANULOCYTES NFR BLD: 0.2 %
LYMPHOCYTES # BLD AUTO: 0.8 10E9/L (ref 0.8–5.3)
LYMPHOCYTES NFR BLD AUTO: 15.1 %
MCH RBC QN AUTO: 28.6 PG (ref 26.5–33)
MCHC RBC AUTO-ENTMCNC: 32 G/DL (ref 31.5–36.5)
MCV RBC AUTO: 89 FL (ref 78–100)
MONOCYTES # BLD AUTO: 0.5 10E9/L (ref 0–1.3)
MONOCYTES NFR BLD AUTO: 8.7 %
NEUTROPHILS # BLD AUTO: 3.9 10E9/L (ref 1.6–8.3)
NEUTROPHILS NFR BLD AUTO: 75.6 %
NRBC # BLD AUTO: 0 10*3/UL
NRBC BLD AUTO-RTO: 0 /100
PLATELET # BLD AUTO: 80 10E9/L (ref 150–450)
POTASSIUM SERPL-SCNC: 4.4 MMOL/L (ref 3.4–5.3)
RBC # BLD AUTO: 2.55 10E12/L (ref 4.4–5.9)
SODIUM SERPL-SCNC: 136 MMOL/L (ref 133–144)
WBC # BLD AUTO: 5.2 10E9/L (ref 4–11)

## 2018-02-02 PROCEDURE — 25000132 ZZH RX MED GY IP 250 OP 250 PS 637: Mod: GY | Performed by: HOSPITALIST

## 2018-02-02 PROCEDURE — 25000131 ZZH RX MED GY IP 250 OP 636 PS 637: Mod: GY | Performed by: PHYSICIAN ASSISTANT

## 2018-02-02 PROCEDURE — 85018 HEMOGLOBIN: CPT | Performed by: INTERNAL MEDICINE

## 2018-02-02 PROCEDURE — 36415 COLL VENOUS BLD VENIPUNCTURE: CPT | Performed by: INTERNAL MEDICINE

## 2018-02-02 PROCEDURE — 94640 AIRWAY INHALATION TREATMENT: CPT

## 2018-02-02 PROCEDURE — A9270 NON-COVERED ITEM OR SERVICE: HCPCS | Mod: GY | Performed by: HOSPITALIST

## 2018-02-02 PROCEDURE — A9270 NON-COVERED ITEM OR SERVICE: HCPCS | Mod: GY | Performed by: PHYSICIAN ASSISTANT

## 2018-02-02 PROCEDURE — 36415 COLL VENOUS BLD VENIPUNCTURE: CPT | Performed by: PHYSICIAN ASSISTANT

## 2018-02-02 PROCEDURE — 25000125 ZZHC RX 250: Performed by: HOSPITALIST

## 2018-02-02 PROCEDURE — 80048 BASIC METABOLIC PNL TOTAL CA: CPT | Performed by: PHYSICIAN ASSISTANT

## 2018-02-02 PROCEDURE — 85025 COMPLETE CBC W/AUTO DIFF WBC: CPT | Performed by: PHYSICIAN ASSISTANT

## 2018-02-02 PROCEDURE — 25000125 ZZHC RX 250: Performed by: PHYSICIAN ASSISTANT

## 2018-02-02 PROCEDURE — 12000007 ZZH R&B INTERMEDIATE

## 2018-02-02 PROCEDURE — 94640 AIRWAY INHALATION TREATMENT: CPT | Mod: 76

## 2018-02-02 PROCEDURE — 25000132 ZZH RX MED GY IP 250 OP 250 PS 637: Mod: GY | Performed by: PHYSICIAN ASSISTANT

## 2018-02-02 PROCEDURE — 25000128 H RX IP 250 OP 636: Performed by: HOSPITALIST

## 2018-02-02 PROCEDURE — 00000146 ZZHCL STATISTIC GLUCOSE BY METER IP

## 2018-02-02 PROCEDURE — 27210995 ZZH RX 272: Performed by: HOSPITALIST

## 2018-02-02 PROCEDURE — 25000128 H RX IP 250 OP 636: Performed by: INTERNAL MEDICINE

## 2018-02-02 PROCEDURE — 40000275 ZZH STATISTIC RCP TIME EA 10 MIN

## 2018-02-02 PROCEDURE — 25000132 ZZH RX MED GY IP 250 OP 250 PS 637: Mod: GY | Performed by: INTERNAL MEDICINE

## 2018-02-02 PROCEDURE — A9270 NON-COVERED ITEM OR SERVICE: HCPCS | Mod: GY | Performed by: INTERNAL MEDICINE

## 2018-02-02 PROCEDURE — 99233 SBSQ HOSP IP/OBS HIGH 50: CPT | Performed by: INTERNAL MEDICINE

## 2018-02-02 RX ORDER — AZITHROMYCIN 250 MG/1
250 TABLET, FILM COATED ORAL DAILY
Status: DISCONTINUED | OUTPATIENT
Start: 2018-02-03 | End: 2018-02-02

## 2018-02-02 RX ORDER — METHYLPREDNISOLONE SODIUM SUCCINATE 125 MG/2ML
60 INJECTION, POWDER, LYOPHILIZED, FOR SOLUTION INTRAMUSCULAR; INTRAVENOUS EVERY 12 HOURS
Status: DISCONTINUED | OUTPATIENT
Start: 2018-02-02 | End: 2018-02-04 | Stop reason: HOSPADM

## 2018-02-02 RX ORDER — AZITHROMYCIN 250 MG/1
250 TABLET, FILM COATED ORAL EVERY 24 HOURS
Status: DISCONTINUED | OUTPATIENT
Start: 2018-02-03 | End: 2018-02-03

## 2018-02-02 RX ADMIN — AZITHROMYCIN MONOHYDRATE 250 MG: 500 INJECTION, POWDER, LYOPHILIZED, FOR SOLUTION INTRAVENOUS at 06:07

## 2018-02-02 RX ADMIN — DAPSONE 100 MG: 100 TABLET ORAL at 21:09

## 2018-02-02 RX ADMIN — BENZONATATE 100 MG: 100 CAPSULE ORAL at 16:14

## 2018-02-02 RX ADMIN — INSULIN ASPART 4 UNITS: 100 INJECTION, SOLUTION INTRAVENOUS; SUBCUTANEOUS at 17:33

## 2018-02-02 RX ADMIN — CALCIUM CARBONATE (ANTACID) CHEW TAB 500 MG 1000 MG: 500 CHEW TAB at 21:55

## 2018-02-02 RX ADMIN — IPRATROPIUM BROMIDE AND ALBUTEROL SULFATE 3 ML: .5; 3 SOLUTION RESPIRATORY (INHALATION) at 22:51

## 2018-02-02 RX ADMIN — IPRATROPIUM BROMIDE AND ALBUTEROL SULFATE 3 ML: .5; 3 SOLUTION RESPIRATORY (INHALATION) at 07:31

## 2018-02-02 RX ADMIN — GABAPENTIN 300 MG: 300 CAPSULE ORAL at 08:01

## 2018-02-02 RX ADMIN — IPRATROPIUM BROMIDE AND ALBUTEROL SULFATE 3 ML: .5; 3 SOLUTION RESPIRATORY (INHALATION) at 11:07

## 2018-02-02 RX ADMIN — AMLODIPINE BESYLATE 5 MG: 5 TABLET ORAL at 08:01

## 2018-02-02 RX ADMIN — LOSARTAN POTASSIUM 50 MG: 50 TABLET ORAL at 21:09

## 2018-02-02 RX ADMIN — CEFTRIAXONE 2 G: 10 INJECTION, POWDER, FOR SOLUTION INTRAVENOUS at 06:01

## 2018-02-02 RX ADMIN — DOLUTEGRAVIR SODIUM 50 MG: 50 TABLET, FILM COATED ORAL at 21:09

## 2018-02-02 RX ADMIN — DARUNAVIR 800 MG: 800 TABLET, FILM COATED ORAL at 21:10

## 2018-02-02 RX ADMIN — ACETAMINOPHEN 650 MG: 325 TABLET, FILM COATED ORAL at 21:50

## 2018-02-02 RX ADMIN — HYDRALAZINE HYDROCHLORIDE 25 MG: 25 TABLET ORAL at 21:09

## 2018-02-02 RX ADMIN — CLOPIDOGREL 75 MG: 75 TABLET, FILM COATED ORAL at 21:09

## 2018-02-02 RX ADMIN — INSULIN ASPART 4 UNITS: 100 INJECTION, SOLUTION INTRAVENOUS; SUBCUTANEOUS at 08:04

## 2018-02-02 RX ADMIN — ABACAVIR 600 MG: 300 TABLET, FILM COATED ORAL at 21:09

## 2018-02-02 RX ADMIN — RITONAVIR 100 MG: 100 TABLET, FILM COATED ORAL at 21:09

## 2018-02-02 RX ADMIN — ISOSORBIDE MONONITRATE 120 MG: 60 TABLET, EXTENDED RELEASE ORAL at 21:09

## 2018-02-02 RX ADMIN — BENZONATATE 100 MG: 100 CAPSULE ORAL at 22:15

## 2018-02-02 RX ADMIN — PREDNISONE 40 MG: 20 TABLET ORAL at 08:01

## 2018-02-02 RX ADMIN — HYDRALAZINE HYDROCHLORIDE 25 MG: 25 TABLET ORAL at 08:04

## 2018-02-02 RX ADMIN — ATORVASTATIN CALCIUM 40 MG: 20 TABLET, FILM COATED ORAL at 21:09

## 2018-02-02 RX ADMIN — GABAPENTIN 600 MG: 300 CAPSULE ORAL at 21:10

## 2018-02-02 RX ADMIN — INSULIN ASPART 2 UNITS: 100 INJECTION, SOLUTION INTRAVENOUS; SUBCUTANEOUS at 12:47

## 2018-02-02 RX ADMIN — INSULIN GLARGINE 50 UNITS: 100 INJECTION, SOLUTION SUBCUTANEOUS at 12:47

## 2018-02-02 RX ADMIN — METHYLPREDNISOLONE SODIUM SUCCINATE 62.5 MG: 125 INJECTION, POWDER, FOR SOLUTION INTRAMUSCULAR; INTRAVENOUS at 17:31

## 2018-02-02 RX ADMIN — IPRATROPIUM BROMIDE AND ALBUTEROL SULFATE 3 ML: .5; 3 SOLUTION RESPIRATORY (INHALATION) at 20:00

## 2018-02-02 RX ADMIN — IPRATROPIUM BROMIDE AND ALBUTEROL SULFATE 3 ML: .5; 3 SOLUTION RESPIRATORY (INHALATION) at 15:50

## 2018-02-02 ASSESSMENT — ACTIVITIES OF DAILY LIVING (ADL)
DRESS: 0-->INDEPENDENT
FALL_HISTORY_WITHIN_LAST_SIX_MONTHS: NO
SWALLOWING: 0-->SWALLOWS FOODS/LIQUIDS WITHOUT DIFFICULTY
COGNITION: 0 - NO COGNITION ISSUES REPORTED
BATHING: 0-->INDEPENDENT
RETIRED_EATING: 0-->INDEPENDENT
TOILETING: 0-->INDEPENDENT
TRANSFERRING: 1-->ASSISTIVE EQUIPMENT
AMBULATION: 1-->ASSISTIVE EQUIPMENT
RETIRED_COMMUNICATION: 0-->UNDERSTANDS/COMMUNICATES WITHOUT DIFFICULTY

## 2018-02-02 NOTE — PROGRESS NOTES
[facility] RCAT Note    Date: 2/1/2018  Admission Diagnosis: Pneumonia and RSV  Pulmonary History: Asthma  Home Nebulizer/ MDI Use: none  Home Oxygen Use: none  Acuity Level (from RT Assessment flow sheet): 4    Aerosol Therapy Initiated: Duoneb Q4w/a      Pulmonary Hygiene Initiated: Acapella      Volume Expansion Therapy Initiated:      Current Oxygen Requirement: 2LPM  Current SpO2: 95    Re-evaluation date: 2/4/2018    See 'RT Assessments' flow sheet for patient assessment scoring and Acuity Level Details.

## 2018-02-02 NOTE — PROGRESS NOTES
[facility]    Hospitalist Progress Note    Date of Service (when I saw the patient): 02/02/2018    Assessment & Plan   Donald Raza is a markedly pleasant 67 year old gentleman with AIDS, ESRD on HD, chronic anemia, CAD s/p stent placement (2015), asthma, Type 2 Diabetes mellitus causing neuropathy, prior history of colon cancer, gout, hypertension, and dyslipidemia, recently diagnosed with acute RSV bronchiolitis who presented to the ED 2/1 with progressive cough, work-up revealing right middle lobe pneumonia and acute anemia. Admitted to observation and transitioned to inpatient for ongoing management.        Acute hypoxic respiratory failure in the setting of post-viral right middle lobe pneumonia:   Hospitalized at [facility] from 1/19-1/21, diagnosed with RSV and now he returns for progression of cough. He was afebrile in the [facility] ED with stable vital signs. WBC 7.4, HGB 6.4 down from chronic 8, PLT 90 k (near or just below his baseline thrombocytopenia). BNP is markedly elevated. Troponin is 0.022. CMP shows ESRD. CXR shows a right middle lobe infiltrate not seen on last week's CXR. Blood cultures were sent and he was given Levaquin.  Overall we suspect post-viral pneumonia; there are no signs of SIRS. Elevated BNP likely due to ESRD; there are no signs of hypervolemia.   -- Episode of hypoxia AM 2/1, now utilizing supplemental oxygen via NC at 2 LPM. Continues on 2 L NC 2/2 with sat's 93%.    -- Ceftriaxone and Azithromycin ordered on admit.   -- If no improvement or worsening consider changing to Levaquin for pseudomonal coverage given recent hospital stay.     -- Blood cx's NGTD.  Sputum culture not yet obtained.   -- Started on Prednisone 40 mg on admit. Transitioned to IV Solumedrol 60 Q 12 2/2 for ongoing wheezing on exam.   -- Duonebs q4 hours while awake, encourage pulmonary toilet, RCAT consulted, incentive spirometer and acapella ordered    -- Tessalon, Robitussin      Acute on chronic  anemia due to ESRD: T/TH/Sat   He gets intermittent blood transfusions with dialysis and the last time his counts dropped this low seems to have been the end of December 2017. Baseline Hgb over the last year is 6.5-8.5.  He does take Plavix. There are no clinical signs of bleeding thus far. Followed by Kathy of Nephrology through [facility].   -- Nephrology consulted, appreciate assistance, dialysis run 2/1 completed with 1 unit PRBC transfused at that time.    -- Hgb post transfusion is 7.3<-7.8.  -- Follow hgb Q 12 today.   -- Transfuse in HD for < 7.        AIDS:   Last CD4 9/2017 was 149; he takes Dapsone for PCP prophylaxis due to a sulfa allergy. Followed by Dr. Thomas of [facility].   [PTA regimen consists of abacavir, darunavir, tivicay, ritonavir]  -- Resume Dapsone and ART above  -- No concern for atypical organism at present causing his PNA.        CAD:   Status post prior stents (stent to diagonal (2016) and mid LAD (2015), followed by Dr. Diaz of [facility]  -- Continue Amlodipine, Statin, Plavix, Hydralazine, Imdur, Losartan. Parameters in place.       Type 2 Diabetes mellitus, insulin dependent, controlled:   We expect hyperglycemia with the Prednisone. A1C 4.9 on 9/20/17. Followed by Dr. Guillen through [facility].  -- Lantus 50 U SQ at noon   -- Humalog 15 TID   -- High intensity SSI ordered   -- Gabapentin for neuropathy   -- Monitor for hypoglycemia     DVT Prophylaxis: Pneumatic Compression Devices  Code Status: Full Code       Disposition: Increased steroid therapy today for ongoing wheezing on exam. C/w current Abx. Follow Hgb and transfuse in HD for < 7. Pending improvement in resp status.       Eric Ragland       Interval History   No acute overnight events. Vitals stable and pt afebrile. Says he feels a little better today but still with a frequent cough but unable to bring any sputum up. Some loose stool x 2 since yesterday. No Abd pain, N/V.      -Data reviewed  today: I reviewed all new labs and imaging results over the last 24 hours. I personally reviewed the chest x-ray image(s) showing right sided infiltrate. .    Physical Exam   Temp: 97.4  F (36.3  C) Temp src: Oral BP: 127/66 Pulse: 65 Heart Rate: 90 Resp: 18 SpO2: 93 % O2 Device: Nasal cannula Oxygen Delivery: 2 LPM  Vitals:    02/01/18 0545   Weight: 87.8 kg (193 lb 9.6 oz)     Vital Signs with Ranges  Temp:  [97.4  F (36.3  C)-98.1  F (36.7  C)] 97.4  F (36.3  C)  Pulse:  [65] 65  Heart Rate:  [74-91] 90  Resp:  [16-20] 18  BP: (127-176)/(66-89) 127/66  SpO2:  [92 %-96 %] 93 %  I/O last 3 completed shifts:  In: 730 [P.O.:730]  Out: 2625 [Urine:125; Other:2500]    Gen: Patient in no acute distress.  Appears comfortable but with frequent coarse cough.  Heart:  S1S2+, regular rate and rhythm, No murmurs.  Lungs:  Coarse BS b/l, faint exp wheezing b/l, no rales.   Abdomen:  Soft, non tender, non distended, bowel sounds positive.  Extremities:  No edema.    Medications     heparin (porcine) 500 Units/hr (02/01/18 1150)     - MEDICATION INSTRUCTIONS -         sodium chloride (PF)  3 mL Intracatheter Q8H     cefTRIAXone  2 g Intravenous Q24H     azithromycin  250 mg Intravenous Q24H     predniSONE  40 mg Oral Daily     insulin aspart  1-10 Units Subcutaneous TID AC     insulin aspart  1-7 Units Subcutaneous At Bedtime     - MEDICATION INSTRUCTIONS for Dialysis Patients -   Does not apply See Admin Instructions     abacavir  600 mg Oral QPM     amLODIPine (NORVASC) tablet 5 mg  5 mg Oral Daily     atorvastatin  40 mg Oral At Bedtime     clopidogrel (PLAVIX) tablet 75 mg  75 mg Oral QPM     dapsone  100 mg Oral At Bedtime     darunavir  800 mg Oral At Bedtime     dolutegravir  50 mg Oral At Bedtime     gabapentin  300 mg Oral QAM     gabapentin  600 mg Oral QPM     hydrALAZINE (APRESOLINE) tablet 25 mg  25 mg Oral BID     isosorbide mononitrate  120 mg Oral QPM     losartan  50 mg Oral QPM     ritonavir  100 mg Oral At  Bedtime     insulin glargine  50 Units Subcutaneous Daily     insulin aspart  15 Units Subcutaneous TID w/meals     ipratropium - albuterol 0.5 mg/2.5 mg/3 mL  3 mL Nebulization Q4H While awake       Data     Recent Labs  Lab 02/02/18  0635 02/01/18  1712 02/01/18  0250   WBC 5.2  --  7.4   HGB 7.3* 7.8* 6.4*   MCV 89  --  94   PLT 80*  --  90*     --  137   POTASSIUM 4.4  --  4.4   CHLORIDE 99  --  105   CO2 29  --  23   BUN 46*  --  73*   CR 6.26*  --  8.85*   ANIONGAP 8  --  9   PRABHA 8.5  --  8.7   *  --  225*   ALBUMIN  --   --  3.3*   PROTTOTAL  --   --  7.1   BILITOTAL  --   --  0.5   ALKPHOS  --   --  121   ALT  --   --  20   AST  --   --  16   TROPI  --   --  0.022       No results found for this or any previous visit (from the past 24 hour(s)).

## 2018-02-02 NOTE — PROGRESS NOTES
"SPIRITUAL HEALTH SERVICES Progress Note  [facility] 53     visited pt on HCD request. Pt was sitting in a chair after finishing lunch. Pt was in good spirits and joked with  about getting in trouble with his wife for letting a different family member make decisions during a previous hospitalization. Pt is  and has 2 daughters and 5 grandchildren. Pt is Sikh and a member of [employer]. Pt shared that he believes God is infinite love and that hell is this world. Pt reports that he has been HIV positive for 20 years and credits God that the virus never became active. Pt said several times, \"I am ready to go whenever my time is up\". Pt venkatesh fully reflected on the death of his mother.     provided reflective nonjudgmental listening and support. Pt completed long form HCD and received the original and 2 copies. The other copy has been sent to Medical records. Pt is open to visit from [facility] if he is here next week and would appreciate a  visit.  made referral to Fr. Og.    Bebo Grove M.Div.  Chaplain Resident  [phone] Pager  "

## 2018-02-02 NOTE — PLAN OF CARE
Problem: Patient Care Overview  Goal: Plan of Care/Patient Progress Review  Outcome: Improving  Alert and oriented x4 . Up with assist of 1 and walker . Denies pain . Good appetite . Frequent non productive cough noted . Vital signs stable .

## 2018-02-02 NOTE — PLAN OF CARE
Problem: Patient Care Overview  Goal: Plan of Care/Patient Progress Review  Outcome: No Change  Pt A&O x4, VSS, on 2L O2 overnight. Has baseline numbness in toes. Has frequent non productive cough, lung sounds course. Up with 1 assist, on mod carb diet.  at 0200 after getting insulin in late evening. DC plan pending.

## 2018-02-02 NOTE — PLAN OF CARE
Problem: Patient Care Overview  Goal: Plan of Care/Patient Progress Review  Outcome: No Change  Patient arrived to unit from obs at 1750. A&Ox4. VS stable. Up with assist of 1. Tolerating diet. Denies pain. Has frequent non productive cough.

## 2018-02-03 LAB
GLUCOSE BLDC GLUCOMTR-MCNC: 167 MG/DL (ref 70–99)
GLUCOSE BLDC GLUCOMTR-MCNC: 171 MG/DL (ref 70–99)
GLUCOSE BLDC GLUCOMTR-MCNC: 202 MG/DL (ref 70–99)
GLUCOSE BLDC GLUCOMTR-MCNC: 300 MG/DL (ref 70–99)
GLUCOSE BLDC GLUCOMTR-MCNC: 322 MG/DL (ref 70–99)
GLUCOSE SERPL-MCNC: 293 MG/DL (ref 70–99)
HGB BLD-MCNC: 7.1 G/DL (ref 13.3–17.7)

## 2018-02-03 PROCEDURE — 36415 COLL VENOUS BLD VENIPUNCTURE: CPT | Performed by: HOSPITALIST

## 2018-02-03 PROCEDURE — 12000007 ZZH R&B INTERMEDIATE

## 2018-02-03 PROCEDURE — 85018 HEMOGLOBIN: CPT | Performed by: INTERNAL MEDICINE

## 2018-02-03 PROCEDURE — 00000146 ZZHCL STATISTIC GLUCOSE BY METER IP

## 2018-02-03 PROCEDURE — 25000128 H RX IP 250 OP 636: Performed by: INTERNAL MEDICINE

## 2018-02-03 PROCEDURE — 27210995 ZZH RX 272: Performed by: HOSPITALIST

## 2018-02-03 PROCEDURE — 40000275 ZZH STATISTIC RCP TIME EA 10 MIN

## 2018-02-03 PROCEDURE — 25000125 ZZHC RX 250: Performed by: PHYSICIAN ASSISTANT

## 2018-02-03 PROCEDURE — A9270 NON-COVERED ITEM OR SERVICE: HCPCS | Mod: GY | Performed by: HOSPITALIST

## 2018-02-03 PROCEDURE — 63400005 ZZH RX 634: Performed by: INTERNAL MEDICINE

## 2018-02-03 PROCEDURE — 82947 ASSAY GLUCOSE BLOOD QUANT: CPT | Performed by: HOSPITALIST

## 2018-02-03 PROCEDURE — 25000132 ZZH RX MED GY IP 250 OP 250 PS 637: Mod: GY | Performed by: HOSPITALIST

## 2018-02-03 PROCEDURE — 25000128 H RX IP 250 OP 636: Performed by: HOSPITALIST

## 2018-02-03 PROCEDURE — 99232 SBSQ HOSP IP/OBS MODERATE 35: CPT | Performed by: HOSPITALIST

## 2018-02-03 PROCEDURE — 94640 AIRWAY INHALATION TREATMENT: CPT | Mod: 76

## 2018-02-03 PROCEDURE — 94640 AIRWAY INHALATION TREATMENT: CPT

## 2018-02-03 PROCEDURE — 25000131 ZZH RX MED GY IP 250 OP 636 PS 637: Mod: GY | Performed by: PHYSICIAN ASSISTANT

## 2018-02-03 PROCEDURE — 25000132 ZZH RX MED GY IP 250 OP 250 PS 637: Mod: GY | Performed by: PHYSICIAN ASSISTANT

## 2018-02-03 PROCEDURE — 90937 HEMODIALYSIS REPEATED EVAL: CPT

## 2018-02-03 PROCEDURE — A9270 NON-COVERED ITEM OR SERVICE: HCPCS | Mod: GY | Performed by: PHYSICIAN ASSISTANT

## 2018-02-03 RX ORDER — AZITHROMYCIN 250 MG/1
250 TABLET, FILM COATED ORAL DAILY
Status: DISCONTINUED | OUTPATIENT
Start: 2018-02-04 | End: 2018-02-04 | Stop reason: HOSPADM

## 2018-02-03 RX ORDER — HEPARIN SODIUM 1000 [USP'U]/ML
500 INJECTION, SOLUTION INTRAVENOUS; SUBCUTANEOUS CONTINUOUS
Status: DISCONTINUED | OUTPATIENT
Start: 2018-02-03 | End: 2018-02-03

## 2018-02-03 RX ORDER — CALCIUM CARBONATE 500 MG/1
500 TABLET, CHEWABLE ORAL 4 TIMES DAILY
Status: DISCONTINUED | OUTPATIENT
Start: 2018-02-03 | End: 2018-02-04 | Stop reason: HOSPADM

## 2018-02-03 RX ORDER — DOXERCALCIFEROL 4 UG/2ML
2 INJECTION INTRAVENOUS
Status: COMPLETED | OUTPATIENT
Start: 2018-02-03 | End: 2018-02-03

## 2018-02-03 RX ORDER — HEPARIN SODIUM 1000 [USP'U]/ML
500 INJECTION, SOLUTION INTRAVENOUS; SUBCUTANEOUS
Status: COMPLETED | OUTPATIENT
Start: 2018-02-03 | End: 2018-02-03

## 2018-02-03 RX ORDER — ALBUMIN, HUMAN INJ 5% 5 %
250 SOLUTION INTRAVENOUS
Status: DISCONTINUED | OUTPATIENT
Start: 2018-02-03 | End: 2018-02-03

## 2018-02-03 RX ORDER — AMOXICILLIN AND CLAVULANATE POTASSIUM 500; 125 MG/1; MG/1
1 TABLET, FILM COATED ORAL
Status: DISCONTINUED | OUTPATIENT
Start: 2018-02-04 | End: 2018-02-04

## 2018-02-03 RX ADMIN — IPRATROPIUM BROMIDE AND ALBUTEROL SULFATE 3 ML: .5; 3 SOLUTION RESPIRATORY (INHALATION) at 23:31

## 2018-02-03 RX ADMIN — IPRATROPIUM BROMIDE AND ALBUTEROL SULFATE 3 ML: .5; 3 SOLUTION RESPIRATORY (INHALATION) at 16:57

## 2018-02-03 RX ADMIN — HYDRALAZINE HYDROCHLORIDE 25 MG: 25 TABLET ORAL at 21:16

## 2018-02-03 RX ADMIN — DOLUTEGRAVIR SODIUM 50 MG: 50 TABLET, FILM COATED ORAL at 21:17

## 2018-02-03 RX ADMIN — RITONAVIR 100 MG: 100 TABLET, FILM COATED ORAL at 21:16

## 2018-02-03 RX ADMIN — ACETAMINOPHEN 650 MG: 325 TABLET, FILM COATED ORAL at 21:49

## 2018-02-03 RX ADMIN — GABAPENTIN 600 MG: 300 CAPSULE ORAL at 21:17

## 2018-02-03 RX ADMIN — ABACAVIR 600 MG: 300 TABLET, FILM COATED ORAL at 21:17

## 2018-02-03 RX ADMIN — DOXERCALCIFEROL 2 MCG: 4 INJECTION, SOLUTION INTRAVENOUS at 08:42

## 2018-02-03 RX ADMIN — METHYLPREDNISOLONE SODIUM SUCCINATE 62.5 MG: 125 INJECTION, POWDER, FOR SOLUTION INTRAMUSCULAR; INTRAVENOUS at 18:15

## 2018-02-03 RX ADMIN — LOSARTAN POTASSIUM 50 MG: 50 TABLET ORAL at 21:17

## 2018-02-03 RX ADMIN — INSULIN ASPART 2 UNITS: 100 INJECTION, SOLUTION INTRAVENOUS; SUBCUTANEOUS at 18:15

## 2018-02-03 RX ADMIN — IPRATROPIUM BROMIDE AND ALBUTEROL SULFATE 3 ML: .5; 3 SOLUTION RESPIRATORY (INHALATION) at 07:30

## 2018-02-03 RX ADMIN — INSULIN ASPART 2 UNITS: 100 INJECTION, SOLUTION INTRAVENOUS; SUBCUTANEOUS at 12:30

## 2018-02-03 RX ADMIN — HYDRALAZINE HYDROCHLORIDE 25 MG: 25 TABLET ORAL at 07:34

## 2018-02-03 RX ADMIN — EPOETIN ALFA 10000 UNITS: 10000 SOLUTION INTRAVENOUS; SUBCUTANEOUS at 09:17

## 2018-02-03 RX ADMIN — INSULIN ASPART 7 UNITS: 100 INJECTION, SOLUTION INTRAVENOUS; SUBCUTANEOUS at 07:36

## 2018-02-03 RX ADMIN — AMLODIPINE BESYLATE 5 MG: 5 TABLET ORAL at 07:34

## 2018-02-03 RX ADMIN — DAPSONE 100 MG: 100 TABLET ORAL at 21:17

## 2018-02-03 RX ADMIN — HEPARIN SODIUM 500 UNITS/HR: 1000 INJECTION, SOLUTION INTRAVENOUS; SUBCUTANEOUS at 08:43

## 2018-02-03 RX ADMIN — HEPARIN SODIUM 500 UNITS: 1000 INJECTION, SOLUTION INTRAVENOUS; SUBCUTANEOUS at 08:42

## 2018-02-03 RX ADMIN — CLOPIDOGREL 75 MG: 75 TABLET, FILM COATED ORAL at 21:17

## 2018-02-03 RX ADMIN — INSULIN GLARGINE 50 UNITS: 100 INJECTION, SOLUTION SUBCUTANEOUS at 12:30

## 2018-02-03 RX ADMIN — ISOSORBIDE MONONITRATE 120 MG: 60 TABLET, EXTENDED RELEASE ORAL at 21:18

## 2018-02-03 RX ADMIN — CALCIUM CARBONATE (ANTACID) CHEW TAB 500 MG 500 MG: 500 CHEW TAB at 21:16

## 2018-02-03 RX ADMIN — GABAPENTIN 300 MG: 300 CAPSULE ORAL at 07:34

## 2018-02-03 RX ADMIN — ACETAMINOPHEN 650 MG: 325 TABLET, FILM COATED ORAL at 01:33

## 2018-02-03 RX ADMIN — BENZONATATE 100 MG: 100 CAPSULE ORAL at 21:49

## 2018-02-03 RX ADMIN — CEFTRIAXONE 2 G: 10 INJECTION, POWDER, FOR SOLUTION INTRAVENOUS at 06:09

## 2018-02-03 RX ADMIN — METHYLPREDNISOLONE SODIUM SUCCINATE 62.5 MG: 125 INJECTION, POWDER, FOR SOLUTION INTRAMUSCULAR; INTRAVENOUS at 06:05

## 2018-02-03 RX ADMIN — DARUNAVIR 800 MG: 800 TABLET, FILM COATED ORAL at 21:17

## 2018-02-03 RX ADMIN — CALCIUM CARBONATE (ANTACID) CHEW TAB 500 MG 500 MG: 500 CHEW TAB at 18:14

## 2018-02-03 RX ADMIN — ATORVASTATIN CALCIUM 40 MG: 20 TABLET, FILM COATED ORAL at 21:16

## 2018-02-03 NOTE — PROGRESS NOTES
Potassium   Date Value Ref Range Status   02/02/2018 4.4 3.4 - 5.3 mmol/L Final     Lab Results   Component Value Date    HGB 7.1 02/03/2018     Weight: 87.8 kg (193 lb 9.6 oz)  POST WT 84.8 kg    DIALYSIS PROCEDURE NOTE  Hepatitis status of previous patient on machine log was checked and verified ok to use with this patients hepatitis status.    Patient dialyzed for 3.5 hrs. on a 3 K bath with a net fluid removal of  3 L.  A BFR of 400 ml/min was obtained via a LUE AVF using 15 gauge needles.    The patient was seen by Dr. Marcial during the treatment.  Total heparin received during the treatment: 1700 units. Sites held x 12 min then  pressure drsgs applied.      Meds  Given: Epogen, Hectorol, Heparin, see MAR     Complications: none.      Procedure and ESRD teaching done and questions answered. See flowsheet in EPIC for further details and post assessment.    Machine water alarm in place and functioning. Chlorine/Chloramine water system checked every 4 hours.    Pt returned via W/C, A&ox4, calm, cooperative, denies pain    Vascular Access: Aseptic prep done for both on/off.    Report received from: ROBERT Lagunas RN    Report given to: ADITYA Connell RN    HEPATITIS B SURFACE ANTIGEN negative DATE 1/23/18 HEPATITIS B SURFACE ANTIBODY susceptible DATE 10/28/47    Outpatient Dialysis at [facility]

## 2018-02-03 NOTE — PROGRESS NOTES
[facility]    Hospitalist Progress Note    Date of Service (when I saw the patient): 02/03/2018    Assessment & Plan   Donald Raza is a markedly pleasant 67 year old gentleman with AIDS, ESRD on HD, chronic anemia, CAD s/p stent placement (2015), asthma, Type 2 Diabetes mellitus causing neuropathy, prior history of colon cancer, gout, hypertension, and dyslipidemia, recently diagnosed with acute RSV bronchiolitis who presented to the ED 2/1 with progressive cough, work-up revealing right middle lobe pneumonia and acute anemia. Admitted to observation and transitioned to inpatient for ongoing management.        Acute hypoxic respiratory failure in the setting of post-viral right middle lobe pneumonia:   Hospitalized at [facility] from 1/19-1/21, diagnosed with RSV and now he returns for progression of cough. He was afebrile in the [facility] ED with stable vital signs. WBC 7.4, HGB 6.4 down from chronic 8, PLT 90 k (near or just below his baseline thrombocytopenia). BNP is markedly elevated. Troponin is 0.022. CMP shows ESRD. CXR shows a right middle lobe infiltrate not seen on last week's CXR. Blood cultures were sent and he was given Levaquin.  Overall we suspect post-viral pneumonia; there are no signs of SIRS. Elevated BNP likely due to ESRD; there are no signs of hypervolemia.   -- augmentin and azithromycin  -- Blood cx's NGTD unable to obtain sputum  -- IV Solumedrol 60 will continue  -- Duonebs q4 hours while awake, encourage pulmonary toilet, RCAT consulted, incentive spirometer and acapella ordered    -- Tessalon, Robitussin  -- now on room air      Acute on chronic anemia due to ESRD: T/TH/Sat   He gets intermittent blood transfusions with dialysis and the last time his counts dropped this low seems to have been the end of December 2017. Baseline Hgb over the last year is 6.5-8.5.  He does take Plavix. There are no clinical signs of bleeding thus far. Followed by Kathy of Nephrology through [facility].   -- Nephrology consulted, appreciate assistance, dialysis run 2/1 completed with 1 unit PRBC transfused at that time.    -- Hgb post transfusion is 7.3<-7.8.  -- Follow hgb Q 12 today.   -- Transfuse in HD for < 7.        AIDS:   Last CD4 9/2017 was 149; he takes Dapsone for PCP prophylaxis due to a sulfa allergy. Followed by Dr. Thomas of [facility].   [PTA regimen consists of abacavir, darunavir, tivicay, ritonavir]  -- Resume Dapsone and ART above  -- No concern for atypical organism at present causing his PNA.        CAD:   Status post prior stents (stent to diagonal (2016) and mid LAD (2015), followed by Dr. Diaz of [facility]  -- Continue Amlodipine, Statin, Plavix, Hydralazine, Imdur, Losartan. Parameters in place.       Type 2 Diabetes mellitus, insulin dependent, controlled:   We expect hyperglycemia with the Prednisone. A1C 4.9 on 9/20/17. Followed by Dr. Guillen through [facility].  -- Lantus 50 U SQ at noon   -- Humalog 15 TID   -- High intensity SSI ordered   -- Gabapentin for neuropathy   -- Monitor for hypoglycemia     DVT Prophylaxis: Pneumatic Compression Devices  Code Status: Full Code       Disposition: hopefully tomorrow pending the hemoglobin recheck. Suspect he may need transfusion, would consider dialysis if needed.      Catrachito Rosado       Interval History   No acute overnight events.   hgb trended down, has had quite the workup for this in the past, thought 2/2 ESRD.  No obvious bleeding      -Data reviewed today: I reviewed all new labs and imaging results over the last 24 hours. I personally reviewed the chest x-ray image(s) showing right sided infiltrate. .    Physical Exam   Temp: 97.8  F (36.6  C) Temp src: Oral BP: 149/77   Heart Rate: 91 Resp: 16 SpO2: 94 % O2 Device: None (Room air) Oxygen Delivery: 2 LPM  Vitals:    02/01/18 0545   Weight: 87.8 kg (193 lb 9.6 oz)     Vital Signs with Ranges  Temp:  [97.6  F (36.4  C)-98.2  F (36.8  C)] 97.8  F (36.6   C)  Heart Rate:  [71-91] 91  Resp:  [16-18] 16  BP: (133-172)/(64-85) 149/77  SpO2:  [93 %-96 %] 94 %  I/O last 3 completed shifts:  In: 950 [P.O.:950]  Out: 3050 [Urine:50; Other:3000]    Gen: Patient in no acute distress.  Appears comfortable but with frequent coarse cough.  Heart:  S1S2+, regular rate and rhythm, No murmurs.  Lungs:  Coarse BS b/l, faint exp wheezing b/l, no rales.   Abdomen:  Soft, non tender, non distended, bowel sounds positive.  Extremities:  No edema.    Medications       [START ON 2/4/2018] amoxicillin-clavulanate  1 tablet Oral Q24H JAN     [START ON 2/4/2018] azithromycin  250 mg Oral Daily     methylPREDNISolone  62.5 mg Intravenous Q12H     sodium chloride (PF)  3 mL Intracatheter Q8H     insulin aspart  1-10 Units Subcutaneous TID AC     insulin aspart  1-7 Units Subcutaneous At Bedtime     - MEDICATION INSTRUCTIONS for Dialysis Patients -   Does not apply See Admin Instructions     abacavir  600 mg Oral QPM     amLODIPine (NORVASC) tablet 5 mg  5 mg Oral Daily     atorvastatin  40 mg Oral At Bedtime     clopidogrel (PLAVIX) tablet 75 mg  75 mg Oral QPM     dapsone  100 mg Oral At Bedtime     darunavir  800 mg Oral At Bedtime     dolutegravir  50 mg Oral At Bedtime     gabapentin  300 mg Oral QAM     gabapentin  600 mg Oral QPM     hydrALAZINE (APRESOLINE) tablet 25 mg  25 mg Oral BID     isosorbide mononitrate  120 mg Oral QPM     losartan  50 mg Oral QPM     ritonavir  100 mg Oral At Bedtime     insulin glargine  50 Units Subcutaneous Daily     insulin aspart  15 Units Subcutaneous TID w/meals     ipratropium - albuterol 0.5 mg/2.5 mg/3 mL  3 mL Nebulization Q4H While awake       Data     Recent Labs  Lab 02/03/18  0646 02/03/18  0645 02/02/18  1620 02/02/18  0635  02/01/18  0250   WBC  --   --   --  5.2  --  7.4   HGB  --  7.1* 7.6* 7.3*  < > 6.4*   MCV  --   --   --  89  --  94   PLT  --   --   --  80*  --  90*   NA  --   --   --  136  --  137   POTASSIUM  --   --   --  4.4  --   4.4   CHLORIDE  --   --   --  99  --  105   CO2  --   --   --  29  --  23   BUN  --   --   --  46*  --  73*   CR  --   --   --  6.26*  --  8.85*   ANIONGAP  --   --   --  8  --  9   PRABHA  --   --   --  8.5  --  8.7   *  --   --  238*  --  225*   ALBUMIN  --   --   --   --   --  3.3*   PROTTOTAL  --   --   --   --   --  7.1   BILITOTAL  --   --   --   --   --  0.5   ALKPHOS  --   --   --   --   --  121   ALT  --   --   --   --   --  20   AST  --   --   --   --   --  16   TROPI  --   --   --   --   --  0.022   < > = values in this interval not displayed.    No results found for this or any previous visit (from the past 24 hour(s)).

## 2018-02-03 NOTE — PLAN OF CARE
Problem: Patient Care Overview  Goal: Plan of Care/Patient Progress Review  Outcome: No Change  Pt down in dialysis from 0800 to 1200. VSS on RA, A&Ox4. Up SBA, mildly dyspneic on exertion. Frequent, non-productive cough. Contact and droplet precautions maintained. Possible d/c home tomorrow. Will continue to monitor.

## 2018-02-03 NOTE — PLAN OF CARE
Problem: Patient Care Overview  Goal: Plan of Care/Patient Progress Review  Outcome: Improving  Pt A&O. VSS on 2L O2. Frequent non-productive cough. Up with SBA. Tylenol given to manage pain. Continue to monitor.

## 2018-02-04 VITALS
HEIGHT: 71 IN | RESPIRATION RATE: 20 BRPM | BODY MASS INDEX: 27.1 KG/M2 | DIASTOLIC BLOOD PRESSURE: 68 MMHG | HEART RATE: 78 BPM | WEIGHT: 193.6 LBS | OXYGEN SATURATION: 92 % | TEMPERATURE: 97.7 F | SYSTOLIC BLOOD PRESSURE: 132 MMHG

## 2018-02-04 LAB
ANION GAP SERPL CALCULATED.3IONS-SCNC: 10 MMOL/L (ref 3–14)
BUN SERPL-MCNC: 63 MG/DL (ref 7–30)
CALCIUM SERPL-MCNC: 9.3 MG/DL (ref 8.5–10.1)
CHLORIDE SERPL-SCNC: 95 MMOL/L (ref 94–109)
CO2 SERPL-SCNC: 27 MMOL/L (ref 20–32)
CREAT SERPL-MCNC: 5.81 MG/DL (ref 0.66–1.25)
ERYTHROCYTE [DISTWIDTH] IN BLOOD BY AUTOMATED COUNT: 16.7 % (ref 10–15)
GFR SERPL CREATININE-BSD FRML MDRD: 10 ML/MIN/1.7M2
GLUCOSE BLDC GLUCOMTR-MCNC: 214 MG/DL (ref 70–99)
GLUCOSE BLDC GLUCOMTR-MCNC: 230 MG/DL (ref 70–99)
GLUCOSE BLDC GLUCOMTR-MCNC: 231 MG/DL (ref 70–99)
GLUCOSE BLDC GLUCOMTR-MCNC: 283 MG/DL (ref 70–99)
GLUCOSE SERPL-MCNC: 207 MG/DL (ref 70–99)
HCT VFR BLD AUTO: 25.3 % (ref 40–53)
HGB BLD-MCNC: 8.2 G/DL (ref 13.3–17.7)
MCH RBC QN AUTO: 28.8 PG (ref 26.5–33)
MCHC RBC AUTO-ENTMCNC: 32.4 G/DL (ref 31.5–36.5)
MCV RBC AUTO: 89 FL (ref 78–100)
PLATELET # BLD AUTO: 120 10E9/L (ref 150–450)
POTASSIUM SERPL-SCNC: 4.8 MMOL/L (ref 3.4–5.3)
RBC # BLD AUTO: 2.85 10E12/L (ref 4.4–5.9)
SODIUM SERPL-SCNC: 132 MMOL/L (ref 133–144)
WBC # BLD AUTO: 6.2 10E9/L (ref 4–11)

## 2018-02-04 PROCEDURE — 80048 BASIC METABOLIC PNL TOTAL CA: CPT | Performed by: HOSPITALIST

## 2018-02-04 PROCEDURE — 25000131 ZZH RX MED GY IP 250 OP 636 PS 637: Mod: GY | Performed by: PHYSICIAN ASSISTANT

## 2018-02-04 PROCEDURE — 00000146 ZZHCL STATISTIC GLUCOSE BY METER IP

## 2018-02-04 PROCEDURE — 94640 AIRWAY INHALATION TREATMENT: CPT | Mod: 76

## 2018-02-04 PROCEDURE — 94640 AIRWAY INHALATION TREATMENT: CPT

## 2018-02-04 PROCEDURE — A9270 NON-COVERED ITEM OR SERVICE: HCPCS | Mod: GY | Performed by: HOSPITALIST

## 2018-02-04 PROCEDURE — 36415 COLL VENOUS BLD VENIPUNCTURE: CPT | Performed by: HOSPITALIST

## 2018-02-04 PROCEDURE — 99239 HOSP IP/OBS DSCHRG MGMT >30: CPT | Performed by: HOSPITALIST

## 2018-02-04 PROCEDURE — 40000275 ZZH STATISTIC RCP TIME EA 10 MIN

## 2018-02-04 PROCEDURE — 25000132 ZZH RX MED GY IP 250 OP 250 PS 637: Mod: GY | Performed by: PHYSICIAN ASSISTANT

## 2018-02-04 PROCEDURE — 25000128 H RX IP 250 OP 636: Performed by: INTERNAL MEDICINE

## 2018-02-04 PROCEDURE — 25000132 ZZH RX MED GY IP 250 OP 250 PS 637: Mod: GY | Performed by: HOSPITALIST

## 2018-02-04 PROCEDURE — 85027 COMPLETE CBC AUTOMATED: CPT | Performed by: HOSPITALIST

## 2018-02-04 PROCEDURE — A9270 NON-COVERED ITEM OR SERVICE: HCPCS | Mod: GY | Performed by: PHYSICIAN ASSISTANT

## 2018-02-04 PROCEDURE — 25000125 ZZHC RX 250: Performed by: PHYSICIAN ASSISTANT

## 2018-02-04 RX ORDER — AZITHROMYCIN 250 MG/1
250 TABLET, FILM COATED ORAL DAILY
Qty: 4 TABLET | Refills: 0 | Status: SHIPPED | OUTPATIENT
Start: 2018-02-05 | End: 2018-02-09

## 2018-02-04 RX ORDER — IPRATROPIUM BROMIDE AND ALBUTEROL SULFATE 2.5; .5 MG/3ML; MG/3ML
1 SOLUTION RESPIRATORY (INHALATION) EVERY 6 HOURS PRN
Qty: 90 VIAL | Refills: 1 | Status: SHIPPED | OUTPATIENT
Start: 2018-02-04

## 2018-02-04 RX ORDER — PREDNISONE 10 MG/1
TABLET ORAL
Qty: 30 TABLET | Refills: 0 | Status: SHIPPED | OUTPATIENT
Start: 2018-02-04 | End: 2018-03-08

## 2018-02-04 RX ORDER — AMOXICILLIN AND CLAVULANATE POTASSIUM 500; 125 MG/1; MG/1
1 TABLET, FILM COATED ORAL
Status: DISCONTINUED | OUTPATIENT
Start: 2018-02-05 | End: 2018-02-04 | Stop reason: HOSPADM

## 2018-02-04 RX ORDER — AMOXICILLIN AND CLAVULANATE POTASSIUM 500; 125 MG/1; MG/1
1 TABLET, FILM COATED ORAL EVERY EVENING
Qty: 7 TABLET | Refills: 0 | Status: SHIPPED | OUTPATIENT
Start: 2018-02-04 | End: 2018-03-08

## 2018-02-04 RX ADMIN — AMOXICILLIN AND CLAVULANATE POTASSIUM 1 TABLET: 500; 125 TABLET, FILM COATED ORAL at 08:30

## 2018-02-04 RX ADMIN — METHYLPREDNISOLONE SODIUM SUCCINATE 62.5 MG: 125 INJECTION, POWDER, FOR SOLUTION INTRAMUSCULAR; INTRAVENOUS at 05:51

## 2018-02-04 RX ADMIN — INSULIN GLARGINE 50 UNITS: 100 INJECTION, SOLUTION SUBCUTANEOUS at 13:06

## 2018-02-04 RX ADMIN — INSULIN ASPART 4 UNITS: 100 INJECTION, SOLUTION INTRAVENOUS; SUBCUTANEOUS at 08:31

## 2018-02-04 RX ADMIN — IPRATROPIUM BROMIDE AND ALBUTEROL SULFATE 3 ML: .5; 3 SOLUTION RESPIRATORY (INHALATION) at 08:10

## 2018-02-04 RX ADMIN — INSULIN ASPART 6 UNITS: 100 INJECTION, SOLUTION INTRAVENOUS; SUBCUTANEOUS at 13:06

## 2018-02-04 RX ADMIN — GABAPENTIN 300 MG: 300 CAPSULE ORAL at 08:30

## 2018-02-04 RX ADMIN — AZITHROMYCIN 250 MG: 250 TABLET, FILM COATED ORAL at 08:30

## 2018-02-04 RX ADMIN — HYDRALAZINE HYDROCHLORIDE 25 MG: 25 TABLET ORAL at 08:30

## 2018-02-04 RX ADMIN — CALCIUM CARBONATE (ANTACID) CHEW TAB 500 MG 500 MG: 500 CHEW TAB at 13:07

## 2018-02-04 RX ADMIN — IPRATROPIUM BROMIDE AND ALBUTEROL SULFATE 3 ML: .5; 3 SOLUTION RESPIRATORY (INHALATION) at 12:37

## 2018-02-04 RX ADMIN — AMLODIPINE BESYLATE 5 MG: 5 TABLET ORAL at 08:30

## 2018-02-04 RX ADMIN — CALCIUM CARBONATE (ANTACID) CHEW TAB 500 MG 500 MG: 500 CHEW TAB at 08:30

## 2018-02-04 NOTE — PLAN OF CARE
Problem: Patient Care Overview  Goal: Plan of Care/Patient Progress Review  Outcome: Improving  Patient is A&O, VSS on RA, CMS intact, on combination of regular and dialysis diet. Up with SBA, on droplet and contact isolation. Tessalon given for cough and BG checks, discharge pending provided morning Hgb is acceptable. Will continue to monitor

## 2018-02-04 NOTE — PLAN OF CARE
Problem: Patient Care Overview  Goal: Plan of Care/Patient Progress Review  Pt a/o x 4. VSS. Denies pain. Frequent nonproductive cough. Lungs coarse crackles throughout. Dysnpnic with exertion. Dialysis pt, bruit and thrill present. Tolerating mod CHO/ dialysis diet, up with SBA.

## 2018-02-04 NOTE — DISCHARGE SUMMARY
[facility]    Discharge Summary  Hospitalist    Date of Admission:  2/1/2018  Date of Discharge:  2/4/2018  3:42 PM  Discharging Provider: Catrachito Rosado DO    Discharge Diagnoses   Acute hypoxemic respiratory failure  Post viral right middle lobe pneumonia  Acute on chronic anemia due to ESRD  AIDS    History of Present Illness   Donald Raza is an 67 year old male who presented with dyspnea after RSV infection. He was placed on antibiotics and steroids. He did improve. He needed a transfusion while on dialysis for chronic anemia, but he had no active bleeding. His other medications were continued. He will complete a course of steroids and antibiotics as an outpatient    Hospital Course   Donald Raza was admitted on 2/1/2018.  The following problems were addressed during his hospitalization:    Active Problems:    Pneumonia    Acute respiratory failure with hypoxia (H)      Catrachito Rosado DO    Significant Results and Procedures   As above    Pending Results   These results will be followed up by primary care doctor  Unresulted Labs Ordered in the Past 30 Days of this Admission     Date and Time Order Name Status Description    2/1/2018 0409 Blood culture ONE site Preliminary     2/1/2018 0353 Blood culture ONE site Preliminary           Code Status   Full Code       Primary Care Physician   Aida Thomas    Physical Exam   Temp: 98  F (36.7  C) Temp src: Oral BP: 137/73 Pulse: 78 Heart Rate: 79 Resp: 20 SpO2: 91 % O2 Device: None (Room air)    Vitals:    02/01/18 0545   Weight: 87.8 kg (193 lb 9.6 oz)     Vital Signs with Ranges  Temp:  [97.7  F (36.5  C)-98  F (36.7  C)] 98  F (36.7  C)  Pulse:  [78-88] 78  Heart Rate:  [71-91] 79  Resp:  [16-20] 20  BP: (137-168)/(72-85) 137/73  SpO2:  [91 %-95 %] 91 %  I/O last 3 completed shifts:  In: 0   Out: 3100 [Urine:100; Other:3000]    Constitutional: Awake, alert, cooperative, no apparent distress.  Eyes: Conjunctiva and pupils  examined and normal.  HEENT: Moist mucous membranes, normal dentition.  Respiratory: Clear to auscultation bilaterally, no crackles or wheezing.  Cardiovascular: Regular rate and rhythm, normal S1 and S2, and no murmur noted.  GI: Soft, non-distended, non-tender, normal bowel sounds.  Lymph/Hematologic: No anterior cervical or supraclavicular adenopathy.  Skin: No rashes, no cyanosis, no edema.  Musculoskeletal: No joint swelling, erythema or tenderness.  Neurologic: Cranial nerves 2-12 intact, normal strength and sensation.  Psychiatric: Alert, oriented to person, place and time, no obvious anxiety or depression.    Discharge Disposition   Discharged to home  Condition at discharge: Stable    Consultations This Hospital Stay   NEPHROLOGY IP CONSULT  ADVANCE DIRECTIVE IP CONSULT    Time Spent on this Encounter   ICatrachito, personally saw the patient today and spent greater than 30 minutes discharging this patient.    Discharge Orders     Reason for your hospital stay   Postviral pneumonia     Follow-up and recommended labs and tests    Follow up with primary care provider, Aida Thomas, within 7 days to evaluate medication change and for hospital follow- up.  Needs follow up cbc     Activity   Your activity upon discharge: activity as tolerated     Discharge Instructions   Please keep a close eye on your blood sugars while you are taking the prednisone. If you are having frequent blood sugar checks of > 250 or less than 80 please call your primary doctor for help in adjusting your insulin.     You need to see your doctor within 7 days to make sure your diabetes is well controlled and that     Full Code     Diet   Follow this diet upon discharge: Orders Placed This Encounter     Combination Diet Regular Diet Adult; Dialysis Diet; 8033-4317 Calories: Moderate Consistent CHO (4-6 CHO units/meal)       Discharge Medications   Current Discharge Medication List      START taking these medications     Details   amoxicillin-clavulanate (AUGMENTIN) 500-125 MG per tablet Take 1 tablet by mouth every evening  Qty: 7 tablet, Refills: 0    Associated Diagnoses: Acute respiratory failure with hypoxia (H)      azithromycin (ZITHROMAX) 250 MG tablet Take 1 tablet (250 mg) by mouth daily for 4 days  Qty: 4 tablet, Refills: 0    Associated Diagnoses: Acute respiratory failure with hypoxia (H)      predniSONE (DELTASONE) 10 MG tablet 4 tabs daily for 3 days, then 3 tabs daily for 3 days, then 2 tabs daily for 3 days, then 1 tab daily for 3 days, then stop  Qty: 30 tablet, Refills: 0    Associated Diagnoses: Acute respiratory failure with hypoxia (H)         CONTINUE these medications which have NOT CHANGED    Details   Acetaminophen (TYLENOL PO) Take by mouth as needed for mild pain or fever      guaiFENesin (MUCINEX) 600 MG 12 hr tablet Take by mouth as needed for congestion      benzonatate (TESSALON) 100 MG capsule Take 1 capsule (100 mg) by mouth 3 times daily as needed for cough  Qty: 42 capsule, Refills: 0    Associated Diagnoses: RSV bronchiolitis      omega-3 acid ethyl esters (LOVAZA) 1 G capsule Take 2 g by mouth 2 times daily      losartan (COZAAR) 50 MG tablet Take 1 tablet (50 mg) by mouth every evening  Qty: 30 tablet, Refills: 3    Associated Diagnoses: Hypertension secondary to other renal disorders      Isosorbide Mononitrate  MG TB24 Take 1 tablet (120 mg) by mouth every evening  Qty: 30 tablet, Refills: 11    Associated Diagnoses: Coronary artery disease involving native heart, angina presence unspecified, unspecified vessel or lesion type; Hypertension secondary to other renal disorders      insulin glargine (LANTUS) 100 UNIT/ML injection Inject 50 Units Subcutaneous daily Takes about noon      HYDRALAZINE HCL PO Take 25 mg by mouth 2 times daily      AMLODIPINE BESYLATE PO Take 5 mg by mouth daily       Insulin Lispro (HUMALOG PEN SC) Take 15 units TID and an additional 2 units if BG is over 150     "  calcium carbonate (TUMS) 500 MG chewable tablet Take 3 chew tab by mouth 4 times daily       B COMPLEX-C-FOLIC ACID PO Take 1 tablet by mouth At Bedtime       albuterol (PROAIR HFA/PROVENTIL HFA/VENTOLIN HFA) 108 (90 BASE) MCG/ACT Inhaler Inhale 2 puffs into the lungs every 6 hours as needed for shortness of breath / dyspnea or wheezing  Qty: 1 Inhaler, Refills: 1    Associated Diagnoses: Cough      Nutritional Supplements (NEPRO PO) 1-3 times daily      omeprazole (PRILOSEC) 40 MG capsule Take 40 mg by mouth daily 1 hour before dinner      !! gabapentin (NEURONTIN) 300 MG capsule Take 600 mg by mouth every evening      imiquimod (ALDARA) 5 % cream Apply topically as needed      DOXERCALCIFEROL IV Inject 6 mcg into the vein three times a week (with dialysis)      CLONAZEPAM PO Take 0.5 mg by mouth nightly as needed for anxiety (restless legs)      CLOPIDOGREL BISULFATE PO Take 75 mg by mouth every evening       abacavir (ZIAGEN) 300 MG tablet Take 600 mg by mouth every evening       chlorhexidine (CHLORHEXIDINE) 0.12 % solution Rinse and gargle 15 ml by mouth twice a day as directed.      terbinafine (LAMISIL AT) 1 % cream Apply topically 2 times daily as needed       atorvastatin (LIPITOR) 40 MG tablet Take 40 mg by mouth At Bedtime      epoetin brittni (EPOGEN,PROCRIT) 68135 UNIT/ML injection Inject 11,000 Units Subcutaneous three times a week WITH DIALYSIS      iron sucrose (VENOFER) 20 MG/ML injection Inject 50 mg into the vein once a week WIth dialysis      MAGNESIUM OXIDE PO Take 400 mg by mouth At Bedtime      dolutegravir (TIVICAY) 50 MG tablet Take 50 mg by mouth At Bedtime      nitroglycerin (NITROSTAT) 0.4 MG SL tablet One tablet under the tongue every 5 minutes if needed for chest pain. May repeat every 5 minutes for a maximum of 3 doses in 15 minutes\"  Qty: 25 tablet, Refills: 3    Associated Diagnoses: Coronary artery disease due to lipid rich plaque; Status post coronary angioplasty      !! gabapentin " (NEURONTIN) 300 MG capsule Take 300 mg by mouth every morning       dapsone 100 MG tablet Take 100 mg by mouth At Bedtime       Darunavir Ethanolate (PREZISTA PO) Take 800 mg by mouth At Bedtime.      ritonavir (NORVIR) 100 MG capsule Take 1 capsule by mouth At Bedtime       mycophenolate (GENERIC EQUIVALENT) 500 MG tablet Take 1 tablet (500 mg) by mouth 2 times daily On an empty stomach    Comments: HOLD until seen by pcp/ renal within a week  Associated Diagnoses: ESRD (end stage renal disease) on dialysis (H)      order for DME Equipment being ordered: Other: 4WW  Treatment Diagnosis: Decreased activity tolerance  Qty: 1 each, Refills: 0    Associated Diagnoses: SOB (shortness of breath); Generalized muscle weakness       !! - Potential duplicate medications found. Please discuss with provider.        Allergies   Allergies   Allergen Reactions     Calcium Acetate Other (See Comments)     Other reaction(s): Other, see comments  Pain in chest and back  Pain in chest area (sensitive skin)      Diagnostic X-Ray Materials Other (See Comments)     PN: renal failure     Lisinopril      Sulfa Drugs      Data   Most Recent 3 CBC's:  Recent Labs   Lab Test  02/04/18   0650  02/03/18   0645  02/02/18   1620  02/02/18   0635   02/01/18   0250   WBC  6.2   --    --   5.2   --   7.4   HGB  8.2*  7.1*  7.6*  7.3*   < >  6.4*   MCV  89   --    --   89   --   94   PLT  120*   --    --   80*   --   90*    < > = values in this interval not displayed.      Most Recent 3 BMP's:  Recent Labs   Lab Test  02/04/18   0650  02/03/18   0646  02/02/18   0635  02/01/18   0250   NA  132*   --   136  137   POTASSIUM  4.8   --   4.4  4.4   CHLORIDE  95   --   99  105   CO2  27   --   29  23   BUN  63*   --   46*  73*   CR  5.81*   --   6.26*  8.85*   ANIONGAP  10   --   8  9   PRABHA  9.3   --   8.5  8.7   GLC  207*  293*  238*  225*     Most Recent 2 LFT's:  Recent Labs   Lab Test  02/01/18   0250  09/20/17   1211   AST  16  23   ALT  20  23    ALKPHOS  121  99   BILITOTAL  0.5  0.6     Most Recent INR's and Anticoagulation Dosing History:  Anticoagulation Dose History     Recent Dosing and Labs Latest Ref Rng & Units 8/15/2016 1/8/2017 4/15/2017 9/19/2017 9/20/2017 10/10/2017 11/21/2017    INR 0.86 - 1.14 0.99 1.41(H) 1.09 0.94 1.01 0.95 0.97        Most Recent 3 Troponin's:  Recent Labs   Lab Test  02/01/18   0250  12/25/17   1706  12/25/17   1407  12/25/17   0856   04/15/17   0820   05/19/16   0951   TROPI  0.022  0.026  0.027   --    < >   --    < >   --    TROPONIN   --    --    --   0.02   --   0.02   --   0.02    < > = values in this interval not displayed.     Most Recent Cholesterol Panel:  Recent Labs   Lab Test  08/09/17   0818   CHOL  125   LDL  30   HDL  33*   TRIG  311*     Most Recent 6 Bacteria Isolates From Any Culture (See EPIC Reports for Culture Details):  Recent Labs   Lab Test  02/01/18   0440  02/01/18   0405  11/25/17   0350  11/22/17   2000  11/22/17   1958  11/21/17   1921   CULT  No growth after 3 days  No growth after 3 days  No growth  No growth  No growth  No growth     Most Recent TSH, T4 and A1c Labs:  Recent Labs   Lab Test  02/01/18   1130   10/10/17   2225   TSH   --    --   2.19   A1C  Canceled, Test credited   < >   --     < > = values in this interval not displayed.     Results for orders placed or performed during the hospital encounter of 02/01/18   XR Chest 2 Views    Narrative    XR CHEST 2 VIEWS   2/1/2018 3:27 AM     HISTORY: Cough.     COMPARISON: 1/19/2018.    FINDINGS: The heart size is normal. The thoracic aorta is calcified.  There is a new right infrahilar infiltrate and right basilar  infiltrate medially. The lungs are otherwise clear. No pneumothorax or  pleural effusion.      Impression    IMPRESSION: Right infrahilar pneumonia.    LIGIA RIVERO MD

## 2018-02-04 NOTE — PLAN OF CARE
Problem: Pneumonia (Adult)  Goal: Signs and Symptoms of Listed Potential Problems Will be Absent, Minimized or Managed (Pneumonia)  Signs and symptoms of listed potential problems will be absent, minimized or managed by discharge/transition of care (reference Pneumonia (Adult) CPG).   Outcome: No Change  Pt. A&o, up with SBA, no c/o pain, pt. Has chronic pain that is very frequent, d/c instruction reviewed, d/c medications given and pt. D/c to home with brother at 1535.

## 2018-02-05 ENCOUNTER — CARE COORDINATION (OUTPATIENT)
Dept: CARE COORDINATION | Facility: CLINIC | Age: 68
End: 2018-02-05

## 2018-02-05 NOTE — PROGRESS NOTES
[facility] Coord[facility]  [facility]    Patient was enrolled in [facility] upon Discharged from: Hospital    Program Type: Pneumonia    Program Length: 30 days post discharge    Clinical Data:  Outreach Type:  (discharge )          Action: Actions:  (pt completed program )

## 2018-02-07 LAB
BACTERIA SPEC CULT: NO GROWTH
BACTERIA SPEC CULT: NO GROWTH
Lab: NORMAL
Lab: NORMAL
SPECIMEN SOURCE: NORMAL
SPECIMEN SOURCE: NORMAL

## 2018-03-08 ENCOUNTER — APPOINTMENT (OUTPATIENT)
Dept: GENERAL RADIOLOGY | Facility: CLINIC | Age: 68
DRG: 280 | End: 2018-03-08
Attending: EMERGENCY MEDICINE
Payer: MEDICARE

## 2018-03-08 ENCOUNTER — HOSPITAL ENCOUNTER (INPATIENT)
Facility: CLINIC | Age: 68
LOS: 8 days | Discharge: HOME | DRG: 280 | End: 2018-03-16
Attending: EMERGENCY MEDICINE | Admitting: INTERNAL MEDICINE
Payer: MEDICARE

## 2018-03-08 DIAGNOSIS — Z99.2 ANEMIA IN CHRONIC KIDNEY DISEASE, ON CHRONIC DIALYSIS (H): ICD-10-CM

## 2018-03-08 DIAGNOSIS — I25.10 CORONARY ARTERY DISEASE, ANGINA PRESENCE UNSPECIFIED, UNSPECIFIED VESSEL OR LESION TYPE, UNSPECIFIED WHETHER NATIVE OR TRANSPLANTED HEART: Primary | Chronic | ICD-10-CM

## 2018-03-08 DIAGNOSIS — I15.1 HYPERTENSION SECONDARY TO OTHER RENAL DISORDERS: Chronic | ICD-10-CM

## 2018-03-08 DIAGNOSIS — D63.1 ANEMIA IN CHRONIC KIDNEY DISEASE, ON CHRONIC DIALYSIS (H): ICD-10-CM

## 2018-03-08 DIAGNOSIS — R07.9 ACUTE CHEST PAIN: ICD-10-CM

## 2018-03-08 DIAGNOSIS — J96.01 ACUTE RESPIRATORY FAILURE WITH HYPOXIA (H): ICD-10-CM

## 2018-03-08 DIAGNOSIS — J81.0 ACUTE PULMONARY EDEMA (H): ICD-10-CM

## 2018-03-08 DIAGNOSIS — N18.6 ANEMIA IN CHRONIC KIDNEY DISEASE, ON CHRONIC DIALYSIS (H): ICD-10-CM

## 2018-03-08 PROBLEM — J96.90 RESPIRATORY FAILURE (H): Status: ACTIVE | Noted: 2018-03-08

## 2018-03-08 LAB
ALBUMIN SERPL-MCNC: 3.5 G/DL (ref 3.4–5)
ALP SERPL-CCNC: 125 U/L (ref 40–150)
ALT SERPL W P-5'-P-CCNC: 16 U/L (ref 0–70)
ANION GAP SERPL CALCULATED.3IONS-SCNC: 11 MMOL/L (ref 3–14)
AST SERPL W P-5'-P-CCNC: 18 U/L (ref 0–45)
BASOPHILS # BLD AUTO: 0 10E9/L (ref 0–0.2)
BASOPHILS NFR BLD AUTO: 0.4 %
BILIRUB SERPL-MCNC: 0.5 MG/DL (ref 0.2–1.3)
BLD PROD TYP BPU: NORMAL
BLD UNIT ID BPU: 0
BLOOD PRODUCT CODE: NORMAL
BPU ID: NORMAL
BUN SERPL-MCNC: 61 MG/DL (ref 7–30)
CALCIUM SERPL-MCNC: 9.2 MG/DL (ref 8.5–10.1)
CHLORIDE SERPL-SCNC: 102 MMOL/L (ref 94–109)
CO2 SERPL-SCNC: 25 MMOL/L (ref 20–32)
CREAT SERPL-MCNC: 8.08 MG/DL (ref 0.66–1.25)
DIFFERENTIAL METHOD BLD: ABNORMAL
EOSINOPHIL # BLD AUTO: 0.1 10E9/L (ref 0–0.7)
EOSINOPHIL NFR BLD AUTO: 1.7 %
ERYTHROCYTE [DISTWIDTH] IN BLOOD BY AUTOMATED COUNT: 15.7 % (ref 10–15)
GFR SERPL CREATININE-BSD FRML MDRD: 7 ML/MIN/1.7M2
GLUCOSE BLDC GLUCOMTR-MCNC: 150 MG/DL (ref 70–99)
GLUCOSE BLDC GLUCOMTR-MCNC: 154 MG/DL (ref 70–99)
GLUCOSE BLDC GLUCOMTR-MCNC: 190 MG/DL (ref 70–99)
GLUCOSE BLDC GLUCOMTR-MCNC: 198 MG/DL (ref 70–99)
GLUCOSE BLDC GLUCOMTR-MCNC: 92 MG/DL (ref 70–99)
GLUCOSE SERPL-MCNC: 161 MG/DL (ref 70–99)
HBA1C MFR BLD: NORMAL % (ref 4.3–6)
HCT VFR BLD AUTO: 22.9 % (ref 40–53)
HGB BLD-MCNC: 7.1 G/DL (ref 13.3–17.7)
IMM GRANULOCYTES # BLD: 0.1 10E9/L (ref 0–0.4)
IMM GRANULOCYTES NFR BLD: 1.7 %
INTERPRETATION ECG - MUSE: NORMAL
LYMPHOCYTES # BLD AUTO: 0.7 10E9/L (ref 0.8–5.3)
LYMPHOCYTES NFR BLD AUTO: 15 %
MCH RBC QN AUTO: 29.2 PG (ref 26.5–33)
MCHC RBC AUTO-ENTMCNC: 31 G/DL (ref 31.5–36.5)
MCV RBC AUTO: 94 FL (ref 78–100)
MONOCYTES # BLD AUTO: 0.4 10E9/L (ref 0–1.3)
MONOCYTES NFR BLD AUTO: 8.6 %
NEUTROPHILS # BLD AUTO: 3.4 10E9/L (ref 1.6–8.3)
NEUTROPHILS NFR BLD AUTO: 72.6 %
NRBC # BLD AUTO: 0 10*3/UL
NRBC BLD AUTO-RTO: 0 /100
NT-PROBNP SERPL-MCNC: ABNORMAL PG/ML (ref 0–900)
PLATELET # BLD AUTO: 188 10E9/L (ref 150–450)
POTASSIUM SERPL-SCNC: 3.8 MMOL/L (ref 3.4–5.3)
PROT SERPL-MCNC: 7.3 G/DL (ref 6.8–8.8)
RBC # BLD AUTO: 2.43 10E12/L (ref 4.4–5.9)
SODIUM SERPL-SCNC: 138 MMOL/L (ref 133–144)
TRANSFUSION STATUS PATIENT QL: NORMAL
TRANSFUSION STATUS PATIENT QL: NORMAL
TROPONIN I BLD-MCNC: 0.02 UG/L (ref 0–0.1)
TROPONIN I SERPL-MCNC: 0.04 UG/L (ref 0–0.04)
TROPONIN I SERPL-MCNC: 0.04 UG/L (ref 0–0.04)
TROPONIN I SERPL-MCNC: 0.05 UG/L (ref 0–0.04)
WBC # BLD AUTO: 4.7 10E9/L (ref 4–11)

## 2018-03-08 PROCEDURE — 80053 COMPREHEN METABOLIC PANEL: CPT | Performed by: EMERGENCY MEDICINE

## 2018-03-08 PROCEDURE — 00000146 ZZHCL STATISTIC GLUCOSE BY METER IP

## 2018-03-08 PROCEDURE — 96365 THER/PROPH/DIAG IV INF INIT: CPT

## 2018-03-08 PROCEDURE — 5A1D70Z PERFORMANCE OF URINARY FILTRATION, INTERMITTENT, LESS THAN 6 HOURS PER DAY: ICD-10-PCS | Performed by: INTERNAL MEDICINE

## 2018-03-08 PROCEDURE — A9270 NON-COVERED ITEM OR SERVICE: HCPCS | Mod: GY | Performed by: EMERGENCY MEDICINE

## 2018-03-08 PROCEDURE — 83036 HEMOGLOBIN GLYCOSYLATED A1C: CPT | Performed by: EMERGENCY MEDICINE

## 2018-03-08 PROCEDURE — 84484 ASSAY OF TROPONIN QUANT: CPT | Performed by: EMERGENCY MEDICINE

## 2018-03-08 PROCEDURE — 99223 1ST HOSP IP/OBS HIGH 75: CPT | Mod: AI | Performed by: INTERNAL MEDICINE

## 2018-03-08 PROCEDURE — 63400005 ZZH RX 634: Performed by: INTERNAL MEDICINE

## 2018-03-08 PROCEDURE — 86923 COMPATIBILITY TEST ELECTRIC: CPT | Performed by: INTERNAL MEDICINE

## 2018-03-08 PROCEDURE — 94660 CPAP INITIATION&MGMT: CPT

## 2018-03-08 PROCEDURE — 12000047 ZZH R&B IMCU

## 2018-03-08 PROCEDURE — 40000275 ZZH STATISTIC RCP TIME EA 10 MIN

## 2018-03-08 PROCEDURE — A9270 NON-COVERED ITEM OR SERVICE: HCPCS | Mod: GY | Performed by: INTERNAL MEDICINE

## 2018-03-08 PROCEDURE — P9016 RBC LEUKOCYTES REDUCED: HCPCS | Performed by: INTERNAL MEDICINE

## 2018-03-08 PROCEDURE — 94640 AIRWAY INHALATION TREATMENT: CPT

## 2018-03-08 PROCEDURE — 99285 EMERGENCY DEPT VISIT HI MDM: CPT | Mod: 25

## 2018-03-08 PROCEDURE — 25000132 ZZH RX MED GY IP 250 OP 250 PS 637: Mod: GY | Performed by: INTERNAL MEDICINE

## 2018-03-08 PROCEDURE — 83880 ASSAY OF NATRIURETIC PEPTIDE: CPT | Performed by: EMERGENCY MEDICINE

## 2018-03-08 PROCEDURE — A9270 NON-COVERED ITEM OR SERVICE: HCPCS | Mod: GY | Performed by: PHYSICIAN ASSISTANT

## 2018-03-08 PROCEDURE — 90937 HEMODIALYSIS REPEATED EVAL: CPT

## 2018-03-08 PROCEDURE — 93005 ELECTROCARDIOGRAM TRACING: CPT

## 2018-03-08 PROCEDURE — 85025 COMPLETE CBC W/AUTO DIFF WBC: CPT | Performed by: EMERGENCY MEDICINE

## 2018-03-08 PROCEDURE — 25000125 ZZHC RX 250

## 2018-03-08 PROCEDURE — 25000131 ZZH RX MED GY IP 250 OP 636 PS 637: Mod: GY | Performed by: PHYSICIAN ASSISTANT

## 2018-03-08 PROCEDURE — 25000128 H RX IP 250 OP 636: Performed by: EMERGENCY MEDICINE

## 2018-03-08 PROCEDURE — 84484 ASSAY OF TROPONIN QUANT: CPT | Performed by: PHYSICIAN ASSISTANT

## 2018-03-08 PROCEDURE — 36415 COLL VENOUS BLD VENIPUNCTURE: CPT | Performed by: INTERNAL MEDICINE

## 2018-03-08 PROCEDURE — 84484 ASSAY OF TROPONIN QUANT: CPT

## 2018-03-08 PROCEDURE — 25000132 ZZH RX MED GY IP 250 OP 250 PS 637: Mod: GY | Performed by: PHYSICIAN ASSISTANT

## 2018-03-08 PROCEDURE — 25000132 ZZH RX MED GY IP 250 OP 250 PS 637: Mod: GY | Performed by: EMERGENCY MEDICINE

## 2018-03-08 PROCEDURE — 86900 BLOOD TYPING SEROLOGIC ABO: CPT | Performed by: INTERNAL MEDICINE

## 2018-03-08 PROCEDURE — 71045 X-RAY EXAM CHEST 1 VIEW: CPT

## 2018-03-08 PROCEDURE — 99223 1ST HOSP IP/OBS HIGH 75: CPT | Performed by: INTERNAL MEDICINE

## 2018-03-08 PROCEDURE — 36415 COLL VENOUS BLD VENIPUNCTURE: CPT | Performed by: PHYSICIAN ASSISTANT

## 2018-03-08 PROCEDURE — 86901 BLOOD TYPING SEROLOGIC RH(D): CPT | Performed by: INTERNAL MEDICINE

## 2018-03-08 PROCEDURE — 25000128 H RX IP 250 OP 636: Performed by: INTERNAL MEDICINE

## 2018-03-08 PROCEDURE — 86850 RBC ANTIBODY SCREEN: CPT | Performed by: INTERNAL MEDICINE

## 2018-03-08 RX ORDER — IPRATROPIUM BROMIDE AND ALBUTEROL SULFATE 2.5; .5 MG/3ML; MG/3ML
SOLUTION RESPIRATORY (INHALATION)
Status: COMPLETED
Start: 2018-03-08 | End: 2018-03-08

## 2018-03-08 RX ORDER — RITONAVIR 100 MG/1
100 TABLET ORAL AT BEDTIME
Status: DISCONTINUED | OUTPATIENT
Start: 2018-03-08 | End: 2018-03-16 | Stop reason: HOSPADM

## 2018-03-08 RX ORDER — IPRATROPIUM BROMIDE AND ALBUTEROL SULFATE 2.5; .5 MG/3ML; MG/3ML
3 SOLUTION RESPIRATORY (INHALATION) ONCE
Status: COMPLETED | OUTPATIENT
Start: 2018-03-08 | End: 2018-03-08

## 2018-03-08 RX ORDER — LIDOCAINE 40 MG/G
CREAM TOPICAL
Status: DISCONTINUED | OUTPATIENT
Start: 2018-03-08 | End: 2018-03-09

## 2018-03-08 RX ORDER — LOSARTAN POTASSIUM 50 MG/1
50 TABLET ORAL EVERY EVENING
Status: DISCONTINUED | OUTPATIENT
Start: 2018-03-08 | End: 2018-03-08

## 2018-03-08 RX ORDER — POLYETHYLENE GLYCOL 3350 17 G/17G
17 POWDER, FOR SOLUTION ORAL DAILY PRN
Status: DISCONTINUED | OUTPATIENT
Start: 2018-03-08 | End: 2018-03-16 | Stop reason: HOSPADM

## 2018-03-08 RX ORDER — DEXTROSE MONOHYDRATE 25 G/50ML
25-50 INJECTION, SOLUTION INTRAVENOUS
Status: DISCONTINUED | OUTPATIENT
Start: 2018-03-08 | End: 2018-03-16 | Stop reason: HOSPADM

## 2018-03-08 RX ORDER — DOXERCALCIFEROL 4 UG/2ML
4 INJECTION INTRAVENOUS
Status: COMPLETED | OUTPATIENT
Start: 2018-03-08 | End: 2018-03-08

## 2018-03-08 RX ORDER — CLOPIDOGREL BISULFATE 75 MG/1
75 TABLET ORAL EVERY EVENING
Status: DISCONTINUED | OUTPATIENT
Start: 2018-03-08 | End: 2018-03-16 | Stop reason: HOSPADM

## 2018-03-08 RX ORDER — ONDANSETRON 2 MG/ML
4 INJECTION INTRAMUSCULAR; INTRAVENOUS EVERY 6 HOURS PRN
Status: DISCONTINUED | OUTPATIENT
Start: 2018-03-08 | End: 2018-03-16 | Stop reason: HOSPADM

## 2018-03-08 RX ORDER — HYDRALAZINE HYDROCHLORIDE 25 MG/1
25 TABLET, FILM COATED ORAL 2 TIMES DAILY
Status: DISCONTINUED | OUTPATIENT
Start: 2018-03-08 | End: 2018-03-10

## 2018-03-08 RX ORDER — NITROGLYCERIN 0.4 MG/1
0.4 TABLET SUBLINGUAL EVERY 5 MIN PRN
Status: DISCONTINUED | OUTPATIENT
Start: 2018-03-08 | End: 2018-03-09

## 2018-03-08 RX ORDER — NITROGLYCERIN 20 MG/100ML
.07-2 INJECTION INTRAVENOUS CONTINUOUS
Status: DISCONTINUED | OUTPATIENT
Start: 2018-03-08 | End: 2018-03-08

## 2018-03-08 RX ORDER — ISOSORBIDE MONONITRATE 60 MG/1
120 TABLET, EXTENDED RELEASE ORAL EVERY EVENING
Status: DISCONTINUED | OUTPATIENT
Start: 2018-03-08 | End: 2018-03-16 | Stop reason: HOSPADM

## 2018-03-08 RX ORDER — IRBESARTAN 75 MG/1
150 TABLET ORAL AT BEDTIME
Status: DISCONTINUED | OUTPATIENT
Start: 2018-03-08 | End: 2018-03-11

## 2018-03-08 RX ORDER — AMLODIPINE BESYLATE 5 MG/1
5 TABLET ORAL DAILY
Status: DISCONTINUED | OUTPATIENT
Start: 2018-03-08 | End: 2018-03-13

## 2018-03-08 RX ORDER — OMEGA-3-ACID ETHYL ESTERS 1 G/1
2 CAPSULE, LIQUID FILLED ORAL 2 TIMES DAILY
Status: DISCONTINUED | OUTPATIENT
Start: 2018-03-08 | End: 2018-03-16 | Stop reason: HOSPADM

## 2018-03-08 RX ORDER — CLONAZEPAM 0.5 MG/1
0.5 TABLET ORAL
Status: DISCONTINUED | OUTPATIENT
Start: 2018-03-08 | End: 2018-03-16 | Stop reason: HOSPADM

## 2018-03-08 RX ORDER — ACETAMINOPHEN 325 MG/1
650 TABLET ORAL ONCE
Status: COMPLETED | OUTPATIENT
Start: 2018-03-08 | End: 2018-03-08

## 2018-03-08 RX ORDER — GABAPENTIN 300 MG/1
300 CAPSULE ORAL EVERY MORNING
Status: DISCONTINUED | OUTPATIENT
Start: 2018-03-08 | End: 2018-03-16 | Stop reason: HOSPADM

## 2018-03-08 RX ORDER — DAPSONE 100 MG/1
100 TABLET ORAL AT BEDTIME
Status: DISCONTINUED | OUTPATIENT
Start: 2018-03-08 | End: 2018-03-16 | Stop reason: HOSPADM

## 2018-03-08 RX ORDER — ABACAVIR 300 MG/1
600 TABLET ORAL EVERY EVENING
Status: DISCONTINUED | OUTPATIENT
Start: 2018-03-08 | End: 2018-03-16 | Stop reason: HOSPADM

## 2018-03-08 RX ORDER — NALOXONE HYDROCHLORIDE 0.4 MG/ML
.1-.4 INJECTION, SOLUTION INTRAMUSCULAR; INTRAVENOUS; SUBCUTANEOUS
Status: DISCONTINUED | OUTPATIENT
Start: 2018-03-08 | End: 2018-03-16 | Stop reason: HOSPADM

## 2018-03-08 RX ORDER — ACETAMINOPHEN 325 MG/1
650 TABLET ORAL EVERY 4 HOURS PRN
Status: DISCONTINUED | OUTPATIENT
Start: 2018-03-08 | End: 2018-03-16 | Stop reason: HOSPADM

## 2018-03-08 RX ORDER — AMOXICILLIN 250 MG
1 CAPSULE ORAL 2 TIMES DAILY PRN
Status: DISCONTINUED | OUTPATIENT
Start: 2018-03-08 | End: 2018-03-16 | Stop reason: HOSPADM

## 2018-03-08 RX ORDER — ONDANSETRON 4 MG/1
4 TABLET, ORALLY DISINTEGRATING ORAL EVERY 6 HOURS PRN
Status: DISCONTINUED | OUTPATIENT
Start: 2018-03-08 | End: 2018-03-16 | Stop reason: HOSPADM

## 2018-03-08 RX ORDER — NICOTINE POLACRILEX 4 MG
15-30 LOZENGE BUCCAL
Status: DISCONTINUED | OUTPATIENT
Start: 2018-03-08 | End: 2018-03-16 | Stop reason: HOSPADM

## 2018-03-08 RX ORDER — GABAPENTIN 300 MG/1
600 CAPSULE ORAL EVERY EVENING
Status: DISCONTINUED | OUTPATIENT
Start: 2018-03-08 | End: 2018-03-16 | Stop reason: HOSPADM

## 2018-03-08 RX ORDER — IPRATROPIUM BROMIDE AND ALBUTEROL SULFATE 2.5; .5 MG/3ML; MG/3ML
1 SOLUTION RESPIRATORY (INHALATION) EVERY 6 HOURS PRN
Status: DISCONTINUED | OUTPATIENT
Start: 2018-03-08 | End: 2018-03-16 | Stop reason: HOSPADM

## 2018-03-08 RX ORDER — MAGNESIUM OXIDE 400 MG/1
400 TABLET ORAL AT BEDTIME
Status: DISCONTINUED | OUTPATIENT
Start: 2018-03-08 | End: 2018-03-16 | Stop reason: HOSPADM

## 2018-03-08 RX ORDER — ATORVASTATIN CALCIUM 40 MG/1
40 TABLET, FILM COATED ORAL AT BEDTIME
Status: DISCONTINUED | OUTPATIENT
Start: 2018-03-08 | End: 2018-03-16 | Stop reason: HOSPADM

## 2018-03-08 RX ORDER — NITROGLYCERIN 0.4 MG/1
0.4 TABLET SUBLINGUAL EVERY 5 MIN PRN
Status: DISCONTINUED | OUTPATIENT
Start: 2018-03-08 | End: 2018-03-08

## 2018-03-08 RX ORDER — AMOXICILLIN 250 MG
2 CAPSULE ORAL 2 TIMES DAILY PRN
Status: DISCONTINUED | OUTPATIENT
Start: 2018-03-08 | End: 2018-03-16 | Stop reason: HOSPADM

## 2018-03-08 RX ADMIN — ISOSORBIDE MONONITRATE 120 MG: 60 TABLET, EXTENDED RELEASE ORAL at 19:44

## 2018-03-08 RX ADMIN — RITONAVIR 100 MG: 100 TABLET, FILM COATED ORAL at 21:37

## 2018-03-08 RX ADMIN — CLOPIDOGREL 75 MG: 75 TABLET, FILM COATED ORAL at 19:43

## 2018-03-08 RX ADMIN — INSULIN GLARGINE 50 UNITS: 100 INJECTION, SOLUTION SUBCUTANEOUS at 10:44

## 2018-03-08 RX ADMIN — GABAPENTIN 300 MG: 300 CAPSULE ORAL at 17:22

## 2018-03-08 RX ADMIN — DOXERCALCIFEROL 4 MCG: 4 INJECTION, SOLUTION INTRAVENOUS at 15:50

## 2018-03-08 RX ADMIN — ABACAVIR 600 MG: 300 TABLET, FILM COATED ORAL at 19:43

## 2018-03-08 RX ADMIN — INSULIN ASPART 10 UNITS: 100 INJECTION, SOLUTION INTRAVENOUS; SUBCUTANEOUS at 10:44

## 2018-03-08 RX ADMIN — OMEPRAZOLE 40 MG: 20 CAPSULE, DELAYED RELEASE ORAL at 17:22

## 2018-03-08 RX ADMIN — HYDRALAZINE HYDROCHLORIDE 25 MG: 25 TABLET ORAL at 17:23

## 2018-03-08 RX ADMIN — OMEGA-3-ACID ETHYL ESTERS 2 G: 1 CAPSULE, LIQUID FILLED ORAL at 17:22

## 2018-03-08 RX ADMIN — NITROGLYCERIN 0.07 MCG/KG/MIN: 20 INJECTION INTRAVENOUS at 06:39

## 2018-03-08 RX ADMIN — OMEGA-3-ACID ETHYL ESTERS 2 G: 1 CAPSULE, LIQUID FILLED ORAL at 21:37

## 2018-03-08 RX ADMIN — DAPSONE 100 MG: 100 TABLET ORAL at 21:38

## 2018-03-08 RX ADMIN — ATORVASTATIN CALCIUM 40 MG: 40 TABLET, FILM COATED ORAL at 21:39

## 2018-03-08 RX ADMIN — AMLODIPINE BESYLATE 5 MG: 5 TABLET ORAL at 17:23

## 2018-03-08 RX ADMIN — DARUNAVIR 800 MG: 800 TABLET, FILM COATED ORAL at 21:38

## 2018-03-08 RX ADMIN — IPRATROPIUM BROMIDE AND ALBUTEROL SULFATE 6 ML: 2.5; .5 SOLUTION RESPIRATORY (INHALATION) at 06:01

## 2018-03-08 RX ADMIN — IRBESARTAN 150 MG: 75 TABLET ORAL at 21:38

## 2018-03-08 RX ADMIN — ERYTHROPOIETIN 10000 UNITS: 10000 INJECTION, SOLUTION INTRAVENOUS; SUBCUTANEOUS at 15:50

## 2018-03-08 RX ADMIN — HYDRALAZINE HYDROCHLORIDE 25 MG: 25 TABLET ORAL at 21:39

## 2018-03-08 RX ADMIN — ACETAMINOPHEN 650 MG: 325 TABLET, FILM COATED ORAL at 07:32

## 2018-03-08 RX ADMIN — IPRATROPIUM BROMIDE AND ALBUTEROL SULFATE 6 ML: .5; 3 SOLUTION RESPIRATORY (INHALATION) at 06:01

## 2018-03-08 RX ADMIN — SODIUM CHLORIDE 250 ML: 9 INJECTION, SOLUTION INTRAVENOUS at 15:51

## 2018-03-08 RX ADMIN — MAGNESIUM OXIDE TAB 400 MG (241.3 MG ELEMENTAL MG) 400 MG: 400 (241.3 MG) TAB at 21:41

## 2018-03-08 RX ADMIN — Medication: at 21:45

## 2018-03-08 RX ADMIN — GABAPENTIN 600 MG: 300 CAPSULE ORAL at 19:43

## 2018-03-08 RX ADMIN — INSULIN ASPART 10 UNITS: 100 INJECTION, SOLUTION INTRAVENOUS; SUBCUTANEOUS at 17:27

## 2018-03-08 RX ADMIN — SODIUM CHLORIDE 300 ML: 9 INJECTION, SOLUTION INTRAVENOUS at 15:50

## 2018-03-08 RX ADMIN — Medication: at 10:47

## 2018-03-08 ASSESSMENT — ENCOUNTER SYMPTOMS
CHILLS: 0
ABDOMINAL PAIN: 0
SHORTNESS OF BREATH: 1
NECK PAIN: 0
BACK PAIN: 0
FEVER: 0
COUGH: 1

## 2018-03-08 ASSESSMENT — ACTIVITIES OF DAILY LIVING (ADL)
ADLS_ACUITY_SCORE: 11

## 2018-03-08 ASSESSMENT — PAIN DESCRIPTION - DESCRIPTORS
DESCRIPTORS: DISCOMFORT

## 2018-03-08 NOTE — IP AVS SNAPSHOT
MRN:[id number]                      After Visit Summary   3/8/2018    Donald Raza    MRN: [id number]           Thank you!     Thank you for choosing [facility] for your care. Our goal is always to provide you with excellent care. Hearing back from our patients is one way we can continue to improve our services. Please take a few minutes to complete the written survey that you may receive in the mail after you visit. If you would like to speak to someone directly about your visit please contact Patient Relations at [phone]. Thank you!          Patient Information     Date Of Birth          1950        About your hospital stay     You were admitted on:  March 8, 2018 You last received care in the:  [place] [address] [facility]    You were discharged on:  March 16, 2018        Reason for your hospital stay       You were hospitalized for low hemoglobin, difficulty breathing and chest pain.                  Who to Call     For medical emergencies, please call 911.  For non-urgent questions about your medical care, please call your primary care provider or clinic, [phone]          Attending Provider     Provider Specialty    Taylor Reno MD Emergency Medicine    [facility], Ronal GRUBER MD Internal Medicine       Primary Care Provider Office Phone # Fax #    Aida Thomas [phone]      After Care Instructions     Activity       Your activity upon discharge: activity as tolerated            Diet       Follow this diet upon discharge: Orders Placed This Encounter      Snacks/Supplements Adult: Nepro Oral Supplement; With Meals      Fluid restriction 1500 ML FLUID      Dialysis Diet                  Follow-up Appointments     Follow-up and recommended labs and tests        Follow up with your regular dialysis schedule.                  Further instructions from your care team       What is the [facility]?    Cardiomyopathy  Optimization  Rehabilitation  Education  The [facility] is a heart failure specialty clinic within [facility]. It is an outpatient disease management program that is based on a phase-by-phase approach, which is tailored to each patient's individual needs. The cardiologist, nurse practitioner, physician assistant and nurses provide an ongoing outpatient care and treatment plan that guides heart failure and cardiomyopathy patients from evaluation and education to stabilization. This team works with your current primary doctor and cardiologist to help you:    *  Avoid hospitalizations   *  Slow the progression of the disease   *  Improve  length and quality of life   *  Know who and when to call if heart failure symptoms appear   *  Receive easy access to quality health care and advice   *  Better understand your condition and treatment   *  Decrease the tremendous cost burden of heart failure care   *  Detect future heart problems before they become life threatening  Follow-up Appointments   ___An appointment with the [facility] may already have been made for you (see section  Your next appointments already scheduled ). If you have a question about your appointment, would like to speak to a C.O.R.E. nurse, or would like to become a [facility] Clinic patient, please call one of the following locations:     [facility])     [phone]    [facility])    [phone]    [facility] ([place])  [phone]  ___Your C.O.R.E. Clinic Team will continue to educate you on your heart failure and may adjust medications based on your vital signs, lab work, and how you are feeling. Therefore, it is very important to bring the following to all C.O.R.E. appointments:     An accurate list of your medications    Your medicine bottles    Your weight chart/calendar      General Recommendations To Control Heart Failure When  You Get Home       Heart Failure Instructions for Patients and Families: Please read and check off each of these important instructions as you do them when you get home.          Weight and Symptoms       ___ Put a scale in your bathroom.    ___ Post a weight chart or calendar next to your scale.    ___ Weigh yourself everyday as soon as you get up in the morning (before breakfast).  You should only be wearing your pajamas.  Write your weight on the chart/calendar.  ___ Bring your weight chart/calendar with you to all appointments.  ___ Call your doctor or nurse practitioner if you gain 2 pounds (in 1 day) or 5 pounds in (1 week) from your goal  good  weight.  Your good weight is also called your  dry  weight.  Your doctor or nurse will tell you what your good weight should be.    ___ Call your doctor or nurse practitioner if you have shortness of breath that gets worse over time, leg swelling or fatigue.       Medications and Diet       ___ Make sure to take your medication as prescribed.    ___ Bring a current list of your medication and all of your medicine bottles with you to all appointments.    ___ Limit fluids if you still have swelling or shortness of breath, or if your doctor tells you to do so.    ___ Eat less than 2000 mg of sodium (salt) every day. Read food labels, and do not add salt to meals. Remember, if you eat less salt you retain less fluid.  ___ Follow a heart healthy diet that is low in saturated fat.        Activity and Suggested Lifestyle Changes      ___ Stay active. Talk to your doctor about an exercise program that is safe for your heart.  ___ Stop smoking. Reduce alcohol use.      ___ Lose weight if you are overweight. Extra weight puts a lot of stress on the heart.                 Control for Leg Swelling     ___ Keep your legs elevated (raised) as needed for swelling. If swelling is uncomfortable or elevation doesn t help, ask your doctor about using ACE wraps or support stockings.         What is the [facility]?  Cardiomyopathy  Optimization  Rehabilitation  Education    The [facility] is a heart failure specialty clinic within [facility].  It is an outpatient disease management program that is based on a phase-by-phase approach, which is tailored to each patient s individual needs.  The cardiologist, nurse practitioner, physician assistant and nurses provide an ongoing outpatient care and treatment plan that guides heart failure and cardiomyopathy patients from evaluation and education to stabilization. This team works with your current primary care doctor and cardiologist to help you:    - Avoid hospitalizations  - Slow the progression of the disease  - Improve length and quality of life  - Know who and when to call if heart failure symptoms appear  - Receive easy access to quality health care and advice  - Better understand your condition and treatment  - Decrease the tremendous cost burden of heart failure care  - Detect future heart problems before they become life threatening                                 Follow-up Appointments     ___ An appointment with the [facility] may already have been made for you (see section   Your next appointments already scheduled ).  If you have a question about your appointment, would like to speak with a C.O.R.E. nurse, or would like to become a [facility] Clinic patient, please call one of the following locations:     - [facility]):                                             [phone]  - [facility]):                                            [phone]  - [facility] ([place]):                 [phone]      ___ Your [facility] Clinic Team will continue to educate you on your heart failure and may adjust medications based on your vital signs, lab work, and how you are feeling.  Therefore, it is very important to bring the following to all  "C.O.R.E. appointments:    - An accurate list of your medications  - Your medicine bottles  - Your weight chart/calendar                   Pending Results     No orders found from 3/6/2018 to 3/9/2018.            Statement of Approval     Ordered          18 1047  I have reviewed and agree with all the recommendations and orders detailed in this document.  EFFECTIVE NOW     Approved and electronically signed by:  Clemencia Pal MD             Admission Information     Date & Time Provider Department Dept. Phone    3/8/2018 Ronal Do MD [place] [address] [facility] [phone]      Your Vitals Were     Blood Pressure Temperature Respirations Height Weight Pulse Oximetry    117/65 (BP Location: Right arm) 96.4  F (35.8  C) (Oral) 18 1.803 m (5' 11\") 79.8 kg (175 lb 14.8 oz) 92%    BMI (Body Mass Index)                   24.54 kg/m2           [employer] Information     [employer] lets you send messages to your doctor, view your test results, renew your prescriptions, schedule appointments and more. To sign up, go to [email or web address] . Click on \"Log in\" on the left side of the screen, which will take you to the Welcome page. Then click on \"Sign up Now\" on the right side of the page.     You will be asked to enter the access code listed below, as well as some personal information. Please follow the directions to create your username and password.     Your access code is: [email or web address]  Expires: 2018  8:21 AM     Your access code will  in 90 days. If you need help or a new code, please call your [place] clinic or [phone].        Care EveryWhere ID     This is your Care EveryWhere ID. This could be used by other organizations to access your [place] medical records  [id number]        Equal Access to Services     JOON KERN : Adelaida Dolan, waeric mendiola, qajaydata kaunruly núñez, fatoumata olivo. So [facility] [phone].    ATENCIÓN: Si " lucia metz, tiene a ayala disposición servicios gratuitos de asistencia lingüística. [facility] [phone].    We comply with applicable federal civil rights laws and [place] laws. We do not discriminate on the basis of race, color, national origin, age, disability, sex, sexual orientation, or gender identity.               Review of your medicines      START taking        Dose / Directions    aspirin 81 MG EC tablet   Used for:  Coronary artery disease, angina presence unspecified, unspecified vessel or lesion type, unspecified whether native or transplanted heart        Dose:  81 mg   Start taking on:  3/17/2018   Take 1 tablet (81 mg) by mouth daily   Quantity:  30 tablet   Refills:  0       carvedilol 12.5 MG tablet   Commonly known as:  COREG   Used for:  Hypertension secondary to other renal disorders        Dose:  12.5 mg   Take 1 tablet (12.5 mg) by mouth 2 times daily (with meals)   Quantity:  60 tablet   Refills:  0       irbesartan 300 MG tablet   Commonly known as:  AVAPRO   Used for:  Hypertension secondary to other renal disorders        Dose:  300 mg   Take 1 tablet (300 mg) by mouth At Bedtime   Quantity:  30 tablet   Refills:  0         CONTINUE these medicines which may have CHANGED, or have new prescriptions. If we are uncertain of the size of tablets/capsules you have at home, strength may be listed as something that might have changed.        Dose / Directions    amLODIPine 5 MG tablet   Commonly known as:  NORVASC   This may have changed:    - medication strength  - how much to take   Used for:  Hypertension secondary to other renal disorders        Dose:  7.5 mg   Take 1.5 tablets (7.5 mg) by mouth daily   Quantity:  45 tablet   Refills:  0       hydrALAZINE 25 MG tablet   Commonly known as:  APRESOLINE   This may have changed:    - medication strength  - when to take this   Used for:  Hypertension secondary to other renal disorders        Dose:  25 mg   Take 1 tablet (25 mg) by mouth 3  times daily   Quantity:  90 tablet   Refills:  0         CONTINUE these medicines which have NOT CHANGED        Dose / Directions    abacavir 300 MG tablet   Commonly known as:  ZIAGEN        Dose:  600 mg   Take 600 mg by mouth every evening   Refills:  0       albuterol 108 (90 BASE) MCG/ACT Inhaler   Commonly known as:  PROAIR HFA/PROVENTIL HFA/VENTOLIN HFA   Used for:  Cough        Dose:  2 puff   Inhale 2 puffs into the lungs every 6 hours as needed for shortness of breath / dyspnea or wheezing   Quantity:  1 Inhaler   Refills:  1       atorvastatin 40 MG tablet   Commonly known as:  LIPITOR        Dose:  40 mg   Take 40 mg by mouth At Bedtime   Refills:  0       B COMPLEX-C-FOLIC ACID PO        Dose:  1 tablet   Take 1 tablet by mouth At Bedtime   Refills:  0       benzonatate 100 MG capsule   Commonly known as:  TESSALON   Used for:  RSV bronchiolitis        Dose:  100 mg   Take 1 capsule (100 mg) by mouth 3 times daily as needed for cough   Quantity:  42 capsule   Refills:  0       calcium carbonate 500 MG chewable tablet   Commonly known as:  TUMS        Dose:  3 chew tab   Take 3 chew tab by mouth 4 times daily   Refills:  0       chlorhexidine 0.12 % solution   Commonly known as:  PERIDEX        Rinse and gargle 15 ml by mouth twice a day as directed.   Refills:  0       CLONAZEPAM PO        Dose:  0.5 mg   Take 0.5 mg by mouth nightly as needed for anxiety (restless legs)   Refills:  0       CLOPIDOGREL BISULFATE PO        Dose:  75 mg   Take 75 mg by mouth every evening   Refills:  0       dapsone 100 MG tablet        Dose:  100 mg   Take 100 mg by mouth At Bedtime   Refills:  0       dolutegravir 50 MG tablet   Commonly known as:  TIVICAY        Dose:  50 mg   Take 50 mg by mouth At Bedtime   Refills:  0       DOXERCALCIFEROL IV        Dose:  6 mcg   Inject 6 mcg into the vein three times a week (with dialysis)   Refills:  0       epoetin brittni 64601 UNIT/ML injection   Commonly known as:   EPOGEN/PROCRIT        Dose:  47049 Units   Inject 11,000 Units Subcutaneous three times a week WITH DIALYSIS   Refills:  0       * gabapentin 300 MG capsule   Commonly known as:  NEURONTIN        Dose:  300 mg   Take 300 mg by mouth every morning   Refills:  0       * gabapentin 300 MG capsule   Commonly known as:  NEURONTIN        Dose:  600 mg   Take 600 mg by mouth every evening   Refills:  0       guaiFENesin 600 MG 12 hr tablet   Commonly known as:  MUCINEX        Take by mouth as needed for congestion   Refills:  0       HUMALOG PEN SC   Notes to Patient:  You were using novolog during your hospital stay. You last had 10units with breakfast this morning 3/16        Take 15 units TID and an additional 2 units if BG is over 150   Refills:  0       imiquimod 5 % cream   Commonly known as:  ALDARA        Apply topically as needed   Refills:  0       insulin glargine 100 UNIT/ML injection   Commonly known as:  LANTUS        Dose:  50 Units   Inject 50 Units Subcutaneous daily Takes about noon   Refills:  0       ipratropium - albuterol 0.5 mg/2.5 mg/3 mL 0.5-2.5 (3) MG/3ML neb solution   Commonly known as:  DUONEB   Used for:  Acute respiratory failure with hypoxia (H)        Dose:  1 vial   Take 1 vial (3 mLs) by nebulization every 6 hours as needed for shortness of breath / dyspnea or wheezing   Quantity:  90 vial   Refills:  1       iron sucrose 20 MG/ML injection   Commonly known as:  VENOFER        Dose:  50 mg   Inject 50 mg into the vein once a week WIth dialysis   Refills:  0       Isosorbide Mononitrate  MG Tb24   Used for:  Coronary artery disease involving native heart, angina presence unspecified, unspecified vessel or lesion type, Hypertension secondary to other renal disorders        Dose:  120 mg   Take 1 tablet (120 mg) by mouth every evening   Quantity:  30 tablet   Refills:  11       lamISIL AT 1 % cream   Generic drug:  terbinafine        Apply topically 2 times daily as needed   Refills:   "0       MAGNESIUM OXIDE PO        Dose:  400 mg   Take 400 mg by mouth At Bedtime   Refills:  0       mycophenolate 500 MG tablet   Commonly known as:  GENERIC EQUIVALENT   Used for:  ESRD (end stage renal disease) on dialysis (H)        Dose:  500 mg   Take 1 tablet (500 mg) by mouth 2 times daily On an empty stomach   Refills:  0       NEPRO PO        1-3 times daily   Refills:  0       nitroGLYcerin 0.4 MG sublingual tablet   Commonly known as:  NITROSTAT   Used for:  Coronary artery disease due to lipid rich plaque, Status post coronary angioplasty        One tablet under the tongue every 5 minutes if needed for chest pain. May repeat every 5 minutes for a maximum of 3 doses in 15 minutes\"   Quantity:  25 tablet   Refills:  3       omega-3 acid ethyl esters 1 G capsule   Commonly known as:  Lovaza        Dose:  2 g   Take 2 g by mouth 2 times daily   Refills:  0       order for DME   Used for:  SOB (shortness of breath), Generalized muscle weakness        Equipment being ordered: Other: 4WW Treatment Diagnosis: Decreased activity tolerance   Quantity:  1 each   Refills:  0       PREZISTA PO        Dose:  800 mg   Take 800 mg by mouth At Bedtime.   Refills:  0       priLOSEC 40 MG capsule   Generic drug:  omeprazole        Dose:  40 mg   Take 40 mg by mouth daily 1 hour before dinner   Refills:  0       ritonavir 100 MG capsule   Commonly known as:  NORVIR        Dose:  1 capsule   Take 1 capsule by mouth At Bedtime   Refills:  0       TYLENOL PO        Take by mouth as needed for mild pain or fever   Refills:  0       * Notice:  This list has 2 medication(s) that are the same as other medications prescribed for you. Read the directions carefully, and ask your doctor or other care provider to review them with you.      STOP taking     losartan 50 MG tablet   Commonly known as:  COZAAR                Where to get your medicines      These medications were sent to [id number] - [place], " [place] - [address] [facility] [address] [facility] [place], [address]  [address] [address]     Phone:  [phone]     amLODIPine 5 MG tablet    aspirin 81 MG EC tablet    carvedilol 12.5 MG tablet    hydrALAZINE 25 MG tablet    irbesartan 300 MG tablet                Protect others around you: Learn how to safely use, store and throw away your medicines at www.disposemymeds.org.             Medication List: This is a list of all your medications and when to take them. Check marks below indicate your daily home schedule. Keep this list as a reference.      Medications           Morning Afternoon Evening Bedtime As Needed    abacavir 300 MG tablet   Commonly known as:  ZIAGEN   Take 600 mg by mouth every evening   Last time this was given:  600 mg on 3/15/2018  9:06 PM   Next Dose Due:  Today 3/16 in the evening                                   albuterol 108 (90 BASE) MCG/ACT Inhaler   Commonly known as:  PROAIR HFA/PROVENTIL HFA/VENTOLIN HFA   Inhale 2 puffs into the lungs every 6 hours as needed for shortness of breath / dyspnea or wheezing   Next Dose Due:  Did not take during hospital stay. May take as directed                                amLODIPine 5 MG tablet   Commonly known as:  NORVASC   Take 1.5 tablets (7.5 mg) by mouth daily   Last time this was given:  7.5 mg on 3/16/2018  8:15 AM   Next Dose Due:  Tomorrow 3/17 in the morning                                   aspirin 81 MG EC tablet   Take 1 tablet (81 mg) by mouth daily   Start taking on:  3/17/2018   Last time this was given:  81 mg on 3/16/2018  8:15 AM   Next Dose Due:  Tomorrow 3/17 in the morning                                   atorvastatin 40 MG tablet   Commonly known as:  LIPITOR   Take 40 mg by mouth At Bedtime   Last time this was given:  40 mg on 3/15/2018  9:07 PM   Next Dose Due:  Today 3/16 at bedtime                                   B COMPLEX-C-FOLIC ACID PO   Take 1 tablet by mouth At Bedtime   Last time this was  given:  Today 3/16 at 8:15 AM   Next Dose Due:  Tomorrow 3/17.                                    benzonatate 100 MG capsule   Commonly known as:  TESSALON   Take 1 capsule (100 mg) by mouth 3 times daily as needed for cough   Next Dose Due:  Did not take during hospital stay. May take as directed                                calcium carbonate 500 MG chewable tablet   Commonly known as:  TUMS   Take 3 chew tab by mouth 4 times daily   Next Dose Due:  May take as directed                                            carvedilol 12.5 MG tablet   Commonly known as:  COREG   Take 1 tablet (12.5 mg) by mouth 2 times daily (with meals)   Last time this was given:  12.5 mg on 3/16/2018  8:15 AM   Next Dose Due:  Today 3/16 with evening meal                                      chlorhexidine 0.12 % solution   Commonly known as:  PERIDEX   Rinse and gargle 15 ml by mouth twice a day as directed.   Next Dose Due:  Did not take during hospital stay. May take as directed                                CLONAZEPAM PO   Take 0.5 mg by mouth nightly as needed for anxiety (restless legs)   Last time this was given:  0.5 mg on 3/14/2018 12:52 AM   Next Dose Due:  May take as directed                                   CLOPIDOGREL BISULFATE PO   Take 75 mg by mouth every evening   Last time this was given:  75 mg on 3/15/2018  9:07 PM   Next Dose Due:  Today 3/16 in the evening                                   dapsone 100 MG tablet   Take 100 mg by mouth At Bedtime   Last time this was given:  100 mg on 3/15/2018  9:07 PM   Next Dose Due:  Today 3/16 at bedtime                                   dolutegravir 50 MG tablet   Commonly known as:  TIVICAY   Take 50 mg by mouth At Bedtime   Last time this was given:  50 mg on 3/15/2018  9:07 PM   Next Dose Due:  Today 3/16 at bedtime                                   DOXERCALCIFEROL IV   Inject 6 mcg into the vein three times a week (with dialysis)   Last time this was given:  6 mcg on  3/13/2018  9:19 AM   Next Dose Due:  Tomorrow 3/17 with dialysis                                epoetin brittni 65088 UNIT/ML injection   Commonly known as:  EPOGEN/PROCRIT   Inject 11,000 Units Subcutaneous three times a week WITH DIALYSIS   Last time this was given:  3/15/2018  9:46 AM   Next Dose Due:  Tomorrow 3/17 at dialysis                                * gabapentin 300 MG capsule   Commonly known as:  NEURONTIN   Take 300 mg by mouth every morning   Last time this was given:  300 mg on 3/16/2018  8:14 AM   Next Dose Due:  Tomorrow 3/17 in the morning                                   * gabapentin 300 MG capsule   Commonly known as:  NEURONTIN   Take 600 mg by mouth every evening   Last time this was given:  300 mg on 3/16/2018  8:14 AM   Next Dose Due:  Today 3/16 in the evening                                   guaiFENesin 600 MG 12 hr tablet   Commonly known as:  MUCINEX   Take by mouth as needed for congestion   Next Dose Due:  Did not take during hospital stay. May take as directed                                HUMALOG PEN SC   Take 15 units TID and an additional 2 units if BG is over 150   Next Dose Due:  Today 3/16 with lunch and dinner meals   Notes to Patient:  You were using novolog during your hospital stay. You last had 10units with breakfast this morning 3/16                                         hydrALAZINE 25 MG tablet   Commonly known as:  APRESOLINE   Take 1 tablet (25 mg) by mouth 3 times daily   Last time this was given:  25 mg on 3/16/2018  6:03 AM   Next Dose Due:  Today 3/16 take two more times at 2:00PM and 10:00PM                                         imiquimod 5 % cream   Commonly known as:  ALDARA   Apply topically as needed   Next Dose Due:  Did not take during hospital stay. May take as directed                                insulin glargine 100 UNIT/ML injection   Commonly known as:  LANTUS   Inject 50 Units Subcutaneous daily Takes about noon                                    ipratropium - albuterol 0.5 mg/2.5 mg/3 mL 0.5-2.5 (3) MG/3ML neb solution   Commonly known as:  DUONEB   Take 1 vial (3 mLs) by nebulization every 6 hours as needed for shortness of breath / dyspnea or wheezing   Last time this was given:  6 mLs on 3/8/2018  6:01 AM   Next Dose Due:  Take as directed                                   irbesartan 300 MG tablet   Commonly known as:  AVAPRO   Take 1 tablet (300 mg) by mouth At Bedtime   Last time this was given:  300 mg on 3/15/2018  9:07 PM   Next Dose Due:  Today 3/16 at bedtime                                   iron sucrose 20 MG/ML injection   Commonly known as:  VENOFER   Inject 50 mg into the vein once a week WIth dialysis   Next Dose Due:  Tomorrow 3/17 with dialysis                                Isosorbide Mononitrate  MG Tb24   Take 1 tablet (120 mg) by mouth every evening   Last time this was given:  120 mg on 3/15/2018  9:07 PM   Next Dose Due:  Today 3/16 in the evening                                   lamISIL AT 1 % cream   Apply topically 2 times daily as needed   Generic drug:  terbinafine   Next Dose Due:  Did not take during hospital stay. May take as directed                                MAGNESIUM OXIDE PO   Take 400 mg by mouth At Bedtime   Last time this was given:  400 mg on 3/15/2018  9:07 PM   Next Dose Due:  Today 3/16 at bedtime                                   mycophenolate 500 MG tablet   Commonly known as:  GENERIC EQUIVALENT   Take 1 tablet (500 mg) by mouth 2 times daily On an empty stomach   Next Dose Due:  Did not take during hospital stay. May take as directed                                NEPRO PO   1-3 times daily   Last time this was given:  3/16/2018  8:17 AM   Next Dose Due:  Take as directed                                nitroGLYcerin 0.4 MG sublingual tablet   Commonly known as:  NITROSTAT   One tablet under the tongue every 5 minutes if needed for chest pain. May repeat every 5 minutes for a maximum of 3 doses in 15  "minutes\"   Last time this was given:  0.4 mg on 3/9/2018 12:21 AM                                   omega-3 acid ethyl esters 1 G capsule   Commonly known as:  Lovaza   Take 2 g by mouth 2 times daily   Last time this was given:  2 g on 3/16/2018  8:14 AM   Next Dose Due:  Today 3/16 in the evenin                                      order for DME   Equipment being ordered: Other: 4WW Treatment Diagnosis: Decreased activity tolerance                                PREZISTA PO   Take 800 mg by mouth At Bedtime.   Last time this was given:  800 mg on 3/15/2018  9:07 PM   Next Dose Due:  Today 3/16 at bedtime                                   priLOSEC 40 MG capsule   Take 40 mg by mouth daily 1 hour before dinner   Last time this was given:  40 mg on 3/15/2018  4:26 PM   Generic drug:  omeprazole   Next Dose Due:  Today 3/16 before dinner                                   ritonavir 100 MG capsule   Commonly known as:  NORVIR   Take 1 capsule by mouth At Bedtime   Last time this was given:  3/15 at 9:07PM   Next Dose Due:  Today 3/16 at bedtime                                   TYLENOL PO   Take by mouth as needed for mild pain or fever   Last time this was given:  650 mg on 3/15/2018  4:26 PM   Next Dose Due:  Take as directed                                   * Notice:  This list has 2 medication(s) that are the same as other medications prescribed for you. Read the directions carefully, and ask your doctor or other care provider to review them with you.      "

## 2018-03-08 NOTE — ED PROVIDER NOTES
History     Chief Complaint:  Chest Pain and Shortness of Breath    HPI   Donald Raza is a 68 year old male with a complex past medical history including acute coronary syndrome, bowel disease, coronary artery disease, chronic kidney disease on dialysis, transient ischemic attack, insulin-dependent diabetes mellitus, hypertension, NSTEMI, HIV, and others who presents to the emergency department today for evaluation of chest pain and shortness of breath. The patient states he woke up around 1215-4499 with some chest pain that felt like somebody was squeezing his entire chest. The patient reports his pain radiated into his upper abdomen, but denies any radiation into his legs, back, arms, or neck. The patient also states the pain made it very difficult to breathe and he felt short of breath as a result. The patient called EMS to bring him in for further evaluation, and notes EMS providers gave him one full strength Aspirin and three Nitroglycerin on the way here.  The patient was placed on BiPAP after being quickly evaluated by my colleague, Dr. Conte.      He states he feels better after these interventions, but reports his pain is still 4.5/10. The patient also reports a cough recently and notes his last round of dialysis was Tuesday, 2 days ago. He notes he has felt increasingly weak after dialysis recently.  The patient denies any recent fall, fevers, or pain or swelling in his legs. He states he has been taking all of his regularly scheduled medications.  He still produces small amounts of urine, but is not on diuretic.    Allergies:  Calcium Acetate  [employer]  Lisinopril  Sulfa Drugs     Medications:    Augmentin   Prednisone  Duoneb   Acetaminophen   Mucinex  Tessalon   Lovaza  Losartan   Isosorbide Mononitrate   Insulin glargine   Hydralazine   Mycophenolate   Amlodipine   Insulin Lispro  Tums  Proair  Omeprazole    Gabapentin    Imiquimod   Doxercalciferol  Clonazepam   Clopidogrel    Abacavir    Chlorhexidine   Terbinafine   Atorvastatin   Epoetin brittni  Iron sucrose   Magnesium oxide  Dolutegravir   Nitroglycerin   Dapsone   Darunavir Ethanolate   Ritonavir     Past Medical History:    ACS (acute coronary syndrome)   Allergic rhinitis, cause unspecified   Bilateral pneumonia   Huang disease   Bradycardia   CAD S/P percutaneous coronary angioplasty   Chest pain   CKD (chronic kidney disease)   Dilated aortic root   ESRD (end stage renal disease) on dialysis   Hemodialysis-associated hypotension   Human immunodeficiency virus (HIV) disease   Hypertension   Impotence of organic origin   Increased prostate specific antigen (PSA) velocity   Mixed hyperlipidemia   Near syncope   NSTEMI (non-ST elevated myocardial infarction)    Pulmonary HTN   TIA (transient ischemic attack)   Type 2 diabetes mellitus   Unstable angina     Past Surgical History:    Angiogram   Appendectomy  Cholecystectomy, laparoscopic   Colostomy   Heart catheter, angioplasty   Stent, coronary, S660 15/18, VANITA = Diag, PTCA = LAD  Stent ,coronary, S600 15/18, VANITA = LAD    Family History:    Heart disease  X2  Kidney disease  Hypertension     Social History:  Smoking Status: Former Smoker, 2 PPD, 41 years, Quit: 2003  Smokeless Tobacco: Never Used  Alcohol Use: Negative    Marital Status:  [2]    Review of Systems   Constitutional: Negative for chills and fever.   Respiratory: Positive for cough and shortness of breath.    Cardiovascular: Positive for chest pain. Negative for leg swelling.   Gastrointestinal: Negative for abdominal pain.   Musculoskeletal: Negative for back pain and neck pain.   All other systems reviewed and are negative.    Physical Exam     Patient Vitals for the past 24 hrs:   BP Temp Temp src Heart Rate Resp SpO2 Weight   03/08/18 0741 - - - - - - 87.6 kg (193 lb 2 oz)   03/08/18 0721 - - - - 24 95 % -   03/08/18 0716 - - - 89 - 93 % -   03/08/18 0715 163/80 - - - - - -   03/08/18 0645 167/82 - - 90 - 96  % -   03/08/18 0630 174/83 - - 90 - 96 % -   03/08/18 0615 165/85 - - 91 - 97 % -   03/08/18 0601 - - - - - 97 % -   03/08/18 0600 170/81 - - - - 92 % -   03/08/18 0551 - - - 97 - 91 % -   03/08/18 0550 - 97.5  F (36.4  C) Temporal 98 - (!) 83 % -   03/08/18 0545 - - - 94 - 91 % -   03/08/18 0535 - - - 101 - 94 % -   03/08/18 0533 - - - 110 - 92 % -   03/08/18 0532 (!) 204/106 - - - - - -     Physical Exam  Constitutional: Alert, attentive, GCS 15, elderly male on BiPAP, appears to be breathing comfortably  HENT:    Nose: Nose normal.    Mouth/Throat: Oropharynx is clear, mucous membranes are moist   Eyes: Normal conjunctiva. Pupils are equal, round, and reactive to light.   CV: regular rate and rhythm; no murmurs, rubs or gallups  Chest: Effort normal and bilateral rales at the bases, no reproducible chest wall tenderness  GI:  There is tenderness in upper abdomen with deep palpation. No distension. Normal bowel sounds  MSK: Normal range of motion, left arm fistula with palpable thrill, bilateral 1+ pitting edema   Neurological: Alert, attentive, oriented x4, strength intact   Skin: Skin is warm and dry.    Emergency Department Course   ECG (05:40:33):  Rate 97 bpm. UT interval 176. QRS duration 98. QT/QTc 376/477. P-R-T axes 53 4 66. Normal sinus rhythm. Normal ECG. No significant change compared to ECG dated 2/1/18. Interpreted at 0732 by Taylor Reno MD.    Imaging:  Radiographic findings were communicated with the patient who voiced understanding of the findings.    Chest XR, 1 view, Portable:  Mildly increased interstitial opacities in both lungs,  suspicious for pulmonary edema.  As read by Radiology.    Laboratory:  CBC: HGB 7.1 (L), o/w WNL (WBC 4.7, )   CMP: Glucose 161 (H), BUN 61 (H), Creatinine 8.08 (H), GFR 7 (L), o/w WNL  Troponin POCT (0554): 0.02  Troponin (0631): 0.043  BNP: 81859 (H)    Interventions:  0601: Duoneb, 6 mL, nebulization   0639: 50 mg Nitroglycerin in 250 mL D5W IV  infusion at 0.17 mcg/kg/min  0732: 650 mg Tylenol PO    Emergency Department Course:  The patient arrived in the emergency department via EMS.  0604: The patient had a portable chest x-ray performed prior to my examination, results above.  0608: The patient was placed on BiPAP prior to my examination as he had previously been on 5L oxygen via nasal cannula with continued low oxygen saturations and difficulty breathing.    Past medical records, nursing notes, and vitals reviewed.  0615: I performed an exam of the patient and obtained history, as documented above.   ECG performed, results above.  IV inserted and blood drawn.    0659: I rechecked the patient. Explained findings to the patient. He was taken off BiPAP at this time and placed on 3.5L oxygen via nasal cannula with improved saturations.    0746: I spoke to Noemi Rodriguez PA-C for Dr. Do of the hospitalist service who accepts the patient for admission.     0802: I spoke to Dr. Wilkes on-call for nephrology regarding the patient.    Findings and plan explained to the patient who consents to admission. Discussed the patient with Dr. Do, who will admit the patient to an [facility] bed for further monitoring, evaluation, and treatment.     Impression & Plan    Medical Decision Making:  Donald Raza is a 68 year old male with a complex medical history including end-stage renal disease on Tues/Th/Sat dialysis, coronary artery disease s/p multiple stents, pulmonary hypertension and HIV, who presents to the ED for evaluation of chest pain and shortness of breath.  A broad differential diagnosis was considered including ACS, CHF exacerbation, COPD, community-acquired pneumonia, opportunistic infection, bronchitis, pulmonary embolism, aortic dissection.  He was originally evaluated by my colleague who started him on BiPAP for increased work of breathing and a 5L oxygen requirement. He appears to be volume up on my exam with peripheral edema, rales at his lung  bases, and pulmonary edema on chest x-ray. His BNP is elevated and troponin is normal. He has no acute ST segment changes to indicate ischemia. We have been able to wean him off of the BiPAP and he appears to be breathing comfortably on 3.5L (normally has no oxygen requirement). He does not have leukocytosis or infectious symptoms such as cough or fever.      The patient is also having ongoing chest pain, which is concerning given his history of coronary artery disease. A Nitroglycerin drip was started for his ongoing chest pain and hypertension as well as hypervolemia. He is due for dialysis today, and I spoke with the on-call nephrologist who will arrange for this to be done as an inpatient. I discussed the patient with OFELIA Pulido with hospitalist service. He will be admitted to intermediate care unit bed given his ongoing Nitroglycerin drip. The patient will need serial troponins as well. This plan was discussed with the patient and admitting hospitalist team. All questions were answered and he was admitted in stable condition.        Diagnosis:    ICD-10-CM   1. Acute pulmonary edema (H) J81.0   2. Acute respiratory failure with hypoxia (H) J96.01   3. Acute chest pain R07.9     [place] [facility] [place]    Etelvina Nolen  3/8/2018   [facility] EMERGENCY DEPARTMENT    Etelvina BARRERA, am serving as a scribe at 6:15 AM on 3/8/2018 to document services personally performed by Taylor Reno MD based on my observations and the provider's statements to me.        Taylor Reno MD  03/08/18 0669

## 2018-03-08 NOTE — CONSULTS
"NUTRITION ASSESSMENT & EDUCATION NOTE    REASON FOR ASSESSMENT:  Nutrition education on 2 gram Na diet education  - Patient with significant medical history of HIV, ESRD on HD ([facility]), CAD, HTN, DM2 (insulin requiring) and chronic anemia.     NUTRITION HISTORY:  Information obtained from patient  Home diet: dialysis, high protein diet at home  Follows with RD at dialysis and takes Nepro TID to maintain adequate protein intake  Able to verbalize dialysis diet restrictions and endorses compliance  Writer familiar with patient from numerous recent admits    CURRENT DIET AND NOURISHMENT ORDER:  Diet: 2 gram Na  Current Intake/Tolerance: tolerated breakfast  Factors Affecting Nutrition Intake: none - good appetite    ANTHROPOMETRICS  Height: 5' 11\"  Weight: 87.4 kg  Body mass index is 26.86 kg/(m^2).  Weight Status:  Overweight BMI 25-29.9  IBW: 78.2 kg   % IBW: 112%  Weight History:  Weight appears relatively stable, as noted below - fluctuations anticipated with HD  Wt Readings from Last 10 Encounters:   03/08/18 87.4 kg (192 lb 9.6 oz)   02/01/18 87.8 kg (193 lb 9.6 oz)   02/01/18 90.3 kg (199 lb 1.2 oz)   01/29/18 91.3 kg (201 lb 4.8 oz)   01/19/18 85.3 kg (188 lb)   01/17/18 88.9 kg (196 lb)   12/27/17 85.9 kg (189 lb 6.4 oz)   12/01/17 89.3 kg (196 lb 14.4 oz)   11/28/17 89.8 kg (197 lb 15.6 oz)   10/12/17 89.8 kg (197 lb 15.6 oz)       ASSESSED NUTRIENT NEEDS (Dosing weight: 87 kg suspected dry weight):  Estimated Energy Needs: 1724-6332  kcals (25-30 Kcal/Kg)  Justification: maintenance  Estimated Protein Needs: 104-157 grams protein (1.2-1.8 g pro/Kg)  Justification: Hemodialysis  Estimated Fluid Needs: >1 mL/Kcal  Justification: maintenance, per provider pending fluid status    LABS/MEDICATIONS/PHYSICAL FINDINGS:  Labs reviewed  Meds reviewed  No obvious signs of fat wasting - baseline mild/lower muscle mass across shoulders/clavicle region    MALNUTRITION:  Patient does not meet two of the following criteria " necessary for diagnosing malnutrition: significant weight loss, reduced intake, subcutaneous fat loss, muscle loss or fluid retention    NUTRITION DIAGNOSIS:  Increased nutrient needs (protein) related to dialysis needs as evidenced by reliance on protein supplements to meet needs of 1.2-1.8 grams per kg    INTERVENTIONS:  Nutrition Prescription:  Recommended dialysis diet, Nepro TID with meals  Nephrocaps while on HD    Implementation:    Assessed learning needs, learning preferences and willingness to learn   a) Verbally reviewed current diet restrictions. No need at this time for further restriction. Patient in agreement.    Goals:  Patient to consume >75% of meals TID and 3 high protein supplements per day    Follow Up/Monitoring:  Progress towards goals will be monitored and evaluated per protocol and Practice Guidelines      Riena Longoria RDN, LD, CNSC  Pager - 3rd floor/ICU: [phone]  Pager - All other floors: [phone]  Pager - Weekend/holiday: [phone]  Office: [phone]

## 2018-03-08 NOTE — CONSULTS
Consult Date:  03/08/2018      CARDIOLOGY HOSPITAL CONSULTATION       PRIMARY CARE PHYSICIAN:  Aida Thomas MD      REASON FOR CONSULTATION:  I have been asked by Noemi Rodriguez PA-C, to see Donald Raza for evaluation of chest discomfort, shortness of breath and congestive heart failure.      HISTORY OF PRESENT ILLNESS:  Donald Raza is a delightful 68-year-old white male originally from [place] who has a history of coronary artery disease, end-stage renal disease on dialysis, diabetes mellitus, hypertension, hyperlipidemia, pulmonary hypertension, and now presents with chest pressure and shortness of breath.  The patient states that he was in his usual state of health yesterday and developed sudden onset shortness of breath during the middle of the night.  He called the nurses' line and was told to come to the emergency room.  In the ambulance, he was given 4 baby aspirin and a sublingual nitroglycerin with some relief of his discomfort.  He was given a second sublingual nitroglycerin before arriving in the emergency room, feeling better yet.  He was started on IV nitroglycerin drip with some improvement as well.  His initial oxygen saturation at room air was 83%.  He required BiPAP for a short time in the emergency room and then this was titrated down to 3 liters nasal cannula oxygen.  His BMP is elevated to 39,000.  Chest x-ray was performed and showed mild pulmonary edema.  He is on dialysis, Saturdays, Tuesdays and Thursdays.      The patient's past cardiac history includes several non-ST elevation myocardial infarctions and PCIs.  His last coronary angiogram in 2017 for chest discomfort showed patent diagonal stent and patent mid to distal LAD stent.  The ostium of the diagonal branch artery had a 60%-70% stenosis which was FFR negative.  The diagonal itself was a branching vessel and the inferior pole had a small vessel with 70% stenosis.  The distal LAD also underwent FFR which was also negative.  There  were minimal luminal irregularities in the circumflex system and mild disease in the right coronary artery.  Medical management was recommended.  The patient was admitted again in December for chest discomfort in the setting of hemoglobin of 6.  He received blood transfusion and his chest discomfort improved.  The patient does not exert himself to any major extent.  He uses a cane for ambulation.  He denies significant peripheral edema.  He has been placed on maximal dose isosorbide mononitrate.  His blood pressure has been quite labile and if his blood pressures run too low, he has issues with dialysis.      ALLERGIES:  CALCIUM ACETATE with pain in his chest and back, LISINOPRIL, unknown reaction, and SULFA DRUGS.      MEDICATIONS ON ADMISSION:   1.  DuoNeb nebulized every 6 hours as needed.   2.  Tylenol as needed.   3.  Mucinex 600 mg 12-hour tablets by mouth as needed.   4.  Tessalon 100 mg capsule 3 times daily as needed for cough.   5.  Lovaza 1 gram capsule 2 grams by mouth 2 times daily.   6.  Losartan 50 mg each evening.   7.  Isosorbide mononitrate  mg each evening.   8.  Lantus insulin 50 units subcutaneous at noon.   9.  Hydralazine 25 mg 2 times daily.   10.  Mycophenolate 500 mg tablet 2 times daily.   11.  Amlodipine 5 mg daily.   12.  Calcium carbonate 500 mg 4 times daily.   13.  B-Complex vitamin and folic acid at bedtime.   14.  Albuterol inhaler every 6 hours p.r.n.    15.  Nepro nutritional supplement 1-3 times daily.   16.  Omeprazole 40 mg by mouth before dinner.   17.  Gabapentin 600 mg each evening.   18.  Doxercalciferol IV 6 mcg into the vein 3 times a week with dialysis.   19.  Clonazepam 0.5 mg p.r.n.   20.  Clopidogrel 75 mg each evening.   21.  Ziagen 300 mg 2 tablets by mouth each evening.   22.  Lamisil 1% cream, apply topically 2 times daily as needed.   23.  Atorvastatin 40 mg at bedtime.   24.  Epogen 10,000 units per mL, inject 11,000 units subcutaneously 3 times a week.    25.  Iron 20 mg per mL injection, inject 50 mg into the vein once a week with dialysis.    26.  Magnesium oxide 400 mg at bedtime.   27.  Tivicay 50 mg tablet at bedtime.   28.  Nitrostat p.r.n.    29.  Gabapentin 300 mg each morning.   30.  Dapsone 100 mg tablet at bedtime.   31.  Prezista 800 mg tablet at bedtime.   32.  Norvir 100 mg capsule at bedtime.      PAST MEDICAL HISTORY:   1.  Previous non ST elevation MI and coronary artery disease as listed above.   2.  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   3.  Chronic kidney disease on hemodialysis.   4.  History of dilated aortic root with his last echocardiogram on 12/27/2017 demonstrating normal aortic root.  His ejection fraction was 55% -60% and there was 1+ mitral and tricuspid regurgitation.   5.  HIV positive, currently being treated.   6.  Essential hypertension since 2010.   7.  Mixed hyperlipidemia.   8.  Non-ST elevation myocardial infarction in 12/2015 and 05/2016.   9.  Moderate pulmonary hypertension.   10.  Previous TIA 05/2016.   11.  Type 2 diabetes mellitus, currently insulin-dependent since age 50.   12.  Status post appendectomy in 2000.   13.  Status post laparoscopic cholecystectomy.   14.  Status post colostomy temporarily for diverticulitis.   15.  Previous intracoronary stenting in 05/2015 to the diagonal and PTCA of the LAD.  Drug-eluting stent was placed in the LAD in 2015.   No history of peptic ulcer disease, tuberculosis, kidney stones, hyper or hypothyroidism.   16.  History of chronic anemia.      SOCIAL HISTORY:  The patient is .  He is a former smoker of 2 packs per day with 41 pack-years, quitting in 2003.  He does not drink alcohol.  The patient is retired.      FAMILY HISTORY:  No known premature atherosclerosis.  One brother with dilated aorta.      REVIEW OF SYSTEMS:  A 12-point review of system is performed with pertinent positives and negatives listed in the HPI.  All other review of systems  are asked and are negative.      PHYSICAL EXAMINATION:   VITAL SIGNS:  Current blood pressure is 153/74, pulse is 96 and regular.  The patient is afebrile, currently 94% oxygen saturation on room air.  Weight is 193 pounds (87.4 kg).   GENERAL:  The patient is an alert white male appearing his stated age, in no acute distress.   HEENT:  Normocephalic, atraumatic without xanthelasma.  No arcus senilis.  No oropharyngeal lesions.  Mucous membranes are moist.   NECK:  Supple without thyromegaly.  Carotid upstroke is brisk.  JVP is normal.  HJR is present.   CHEST:  Clear to auscultation with a few scattered rales.  No wheezes or rhonchi.  No kyphosis or scoliosis.   CARDIAC:  Regular rate and rhythm, normal S1 and S2 without S3 or S4 gallop.  There is a 2/6 mid to late blowing systolic murmur of mitral regurgitation, left lower sternal border to the apex.  No other murmurs heard.  No heave, rub or thrill.  Pulses are intact to the lower extremities.  No tenderness to palpation across the precordium.   ABDOMEN:  Obese, soft, nontender without organomegaly.  Bowel sounds are present.  No bruits.   EXTREMITIES:  Without cyanosis or clubbing.  There is trace bilateral pretibial edema.   SKIN:  Warm, dry and intact.   NEUROLOGIC:  Alert and oriented x 3.  Cranial nerves II-XII grossly intact.  Affect is normal.      EKG demonstrates normal sinus rhythm without ST-T wave abnormalities.  This appears to be a normal EKG.  QTc is borderline at 477 milliseconds.      Chest x-ray, probable pulmonary edema, had borderline cardiomegaly.  Atherosclerotic calcification in the thoracic aorta.  A vascular stent is seen in the left axillary region.      Troponin-I is 0.02 with troponin-I ES 0.043, followed by 0.045.  Creatinine 8.08, BUN 61, potassium 3.8, sodium 138, proBNP 39,640.  White count 4700, hemoglobin 7.1, platelet count 188,000.      ASSESSMENT:   1.  Donald Raza is a delightful 68-year-old male with known coronary artery  disease and his last coronary angiogram in 10/2017.  Previous LAD stenting in 06/2015 and ostial diagonal stenting and LAD angioplasty in 12/2015.  There was angioplasty on the diagonal branch of the LAD due to restenosis and LAD stenting in 08/2016.  The patient's current symptoms may represent angina but do not represent an acute myocardial infarction.  I would not, in fact, perform repeat coronary angiography.  Instead I suspect his angina is due to hypertension and anemia with hemoglobin of 7.1.  Maintaining hemoglobin greater than 8, would likely prevent some of this patient's angina.  Also, keeping his blood pressure in the 130/80 range would also help diminish his afterload.  This patient, however, has had some difficulties with hypotension and inability to dialyze.  I would suggest changing his losartan to irbesartan for better blood pressure control.  I would also suggest keeping his hemoglobin at least 8, if possible.   2.  Congestive heart failure.  This is likely acute pulmonary edema secondary to both hypertension and diastolic dysfunction of the left ventricle as well as his anemia.  We will endeavor to reduce his blood pressure and at the same time he will dialyze today.   3.  End-stage renal disease on dialysis.  He is due for his dialysis today.  The patient believes he is currently at his dry weight.   4.  Insulin-dependent diabetes mellitus.  This is being managed by the hospitalist.   5.  Hypertension, as above.  We will discontinue his IV nitroglycerin and make the appropriate change to his ARB agent.   6.  Human immunodeficiency virus (HIV).  The patient continues on his medications.      PLAN:   1.  Medication changes as above.   2.  No plans for coronary angiography at this time.   3.  Mobilize as able.   4.  Keep hemoglobin greater than 8, if possible.   5.  We will change his losartan to irbesartan between 150-300 mg daily and see if this improves his blood pressure.      It is my pleasure  to help in the care of Gregorio Raza.  All his questions were answered to his satisfaction.  I will follow as needed.         MINDI PROCTOR MD, [facility]             D: 2018   T: 2018   MT: CAILIN      Name:     GREGORIO RAZA   MRN:      [id number]        Account:       [id number]   :      1950           Consult Date:  2018      Document: [id number]

## 2018-03-08 NOTE — CONSULTS
Full Cardiology consultation dictated.  Normal troponins.  Mild pulmonary edema by CXR.  Hgb=7.1.  Hypertensive.  His isosorbide is at maximum dose.  Could add Ranexa if necessary.  Control of BP and keeping Hbg>8.0 is suggested.  No coronary angiography necessary at this time.  Chago Loaiza MD, FACC  March 8, 2018 10:22 AM  [id number]

## 2018-03-08 NOTE — ED NOTES
.  [facility]  ED Nurse Handoff Report    Donald Raza is a 68 year old male   ED Chief complaint: many issues  . ED Diagnosis:   Final diagnoses:   Acute pulmonary edema (H)   Acute respiratory failure with hypoxia (H)   Acute chest pain   Anemia in chronic kidney disease, on chronic dialysis (H)     Allergies:   Allergies   Allergen Reactions     Calcium Acetate Other (See Comments)     Other reaction(s): Other, see comments  Pain in chest and back  Pain in chest area (sensitive skin)      Diagnostic X-Ray Materials Other (See Comments)     PN: renal failure     Lisinopril      Sulfa Drugs        Code Status: Full Code  Activity level - Baseline/Home:  Independent. Activity Level - Current:   Stand with Assist. Lift room needed: No. Bariatric: No   Needed: No   Isolation: No. Infection: Not Applicable.     Vital Signs:   Vitals:    03/08/18 0741 03/08/18 0745 03/08/18 0746 03/08/18 0756   BP:  169/82     Resp:    16   Temp:       TempSrc:       SpO2:  93% 95%    Weight: 87.6 kg (193 lb 2 oz)          Cardiac Rhythm:  ,   Cardiac  Cardiac Rhythm: Normal sinus rhythm  Pain level: 0-10 Pain Scale: 1  Patient confused: No. Patient Falls Risk: Yes.   Elimination Status: has not voided    Patient Report - Initial Complaint: 68 year old male with a complex past medical history including acute coronary syndrome, bowel disease, coronary artery disease, chronic kidney disease on dialysis, transient ischemic attack, insulin-dependent diabetes mellitus, hypertension, NSTEMI, HIV, and others who presents to the emergency department today for evaluation of chest pain and shortness of breath. The patient states he woke up around 4202-2761 with some chest pain that felt like somebody was squeezing his entire chest. The patient reports his pain radiated into his upper abdomen, but denies any radiation into his legs, back, arms, or neck. The patient also states the pain made it very difficult to breathe  and he felt short of breath as a result. The patient called EMS to bring him in for further evaluation, and notes EMS providers gave him one full strength Aspirin and three Nitroglycerin on the way here. On my initial examination, the patient  was placed on BiPAP, and he states he feels better after these interventions, but reports his pain is still 4.5/10. The patient also reports a cough recently and notes his last round of dialysis was Tuesday, 2 days ago. He notes he has felt increasingly weak after dialysis recently.  The patient denies any recent fall, fevers, or pain or swelling in his legs. He states he has been taking all of his regularly scheduled medications   Focused Assessment: Respiratory - Respiratory WDL:  WDL except; all Rhythm/Pattern, Respiratory: tachypnea (RR=24) Lung Fields: All Fields Throughout All Lung Fields: Posterior:; clear (exp diminished; no wheezing or rhonchi noted at this time)   Cardiac - Cardiac WDL:  WDL except; all Cardiac Comment: pedal pulses present; radial pulses regular, stronger on right  Review of Systems (Cardiac) - Cardiac Signs/Symptoms: chest pain (0.5/10 nitro infusing (this is less than 1/10)) Cardiac Conditions: stent  Chest Pain Assessment - Rating (0-10):  (less than 1/10 with nitro infusing) Chest Pain Reproducible?: No  Cardiac Monitoring - EKG Monitoring: Yes Cardiac Regularity: Regular Cardiac Rhythm: NSR  Chest Pain Assessment - Chest Pain Location: other (see comments) (across entire chest)  Tests Performed: EKG, labs, CXR. Abnormal Results: .  Labs Ordered and Resulted from Time of ED Arrival Up to the Time of Departure from the ED   CBC WITH PLATELETS DIFFERENTIAL - Abnormal; Notable for the following:        Result Value    RBC Count 2.43 (*)     Hemoglobin 7.1 (*)     Hematocrit 22.9 (*)     MCHC 31.0 (*)     RDW 15.7 (*)     Absolute Lymphocytes 0.7 (*)     All other components within normal limits   COMPREHENSIVE METABOLIC PANEL - Abnormal; Notable  for the following:     Glucose 161 (*)     Urea Nitrogen 61 (*)     Creatinine 8.08 (*)     GFR Estimate 7 (*)     GFR Estimate If Black 8 (*)     All other components within normal limits   NT PROBNP INPATIENT - Abnormal; Notable for the following:     N-Terminal Pro BNP Inpatient [address]85 (*)     All other components within normal limits   TROPONIN I   TROPONIN POCT     .  Chest  XR, 1 view PORTABLE   Final Result   IMPRESSION: Mildly increased interstitial opacities in both lungs,   suspicious for pulmonary edema.      EN KINSEY MD        .   Treatments provided: was placed on bipap earlier; currently tolerating off of bipap on 3L via nc.   Family Comments: NA  OBS brochure/video discussed/provided to patient:  N/A  ED Medications:   Medications   nitroGLYcerin 50 mg in D5W 250 mL (adult std) infusion (0 mcg/kg/min × 90.3 kg Intravenous ED Infusing on Admission/transfer 3/8/18 9329)   ipratropium - albuterol 0.5 mg/2.5 mg/3 mL (DUONEB) neb solution 3 mL (6 mLs Nebulization Given 3/8/18 0601)   acetaminophen (TYLENOL) tablet 650 mg (650 mg Oral Given 3/8/18 0732)     Drips infusing:  Yes, nitro infusing  For the majority of the shift, the patient's behavior Green. Interventions performed were NA.     Severe Sepsis OR Septic Shock Diagnosis Present: No      ED Nurse Name/Phone Number: HERRERA Holley  8:00 AM    RECEIVING UNIT ED HANDOFF REVIEW    Above ED Nurse Handoff Report was reviewed: yes    Reviewed by: Colleen Ford on March 8, 2018 at 8:19 AM

## 2018-03-08 NOTE — LETTER
Transition Communication Hand-off for Care Transitions to Next Level of Care Provider    Name: Donald Raza  : 1950  MRN #: [id number]  Primary Care Provider: Aida Thomas  Primary Care MD Name: Dr. Aida Thomas ID  Primary Clinic: [facility] [address] [place] [id number]  Primary Care Clinic Name: [facility]  Reason for Hospitalization:  Acute pulmonary edema (H) [J81.0]  Acute chest pain [R07.9]  Acute respiratory failure with hypoxia (H) [J96.01]  Anemia in chronic kidney disease, on chronic dialysis (H) [N18.6, D63.1, Z99.2]  Admit Date/Time: 3/8/2018  5:28 AM  Discharge Date: 3/16/18  Payor Source: Payor: MEDICARE / Plan: MEDICARE / Product Type: Medicare /     Readmission Assessment Measure (MUSHTAQ) Risk Score/category: Very High    Reason for Communication Hand-off Referral: Admission diagnoses: CHF  Difficulty understanding plan of care  Multiple providers/specialties    Discharge Plan:  HOME     Discharge Needs Assessment:  Needs       Most Recent Value    Equipment Currently Used at Home cane, straight    Transportation Available car, family or friend will provide      Follow-up plan:  Future Appointments  Date Time Provider Department Center   3/9/2018 2:00 PM [address] R[facility]   3/10/2018 8:30 AM Fabienne Benton, OT [facility] [place] [facility]       Levi Recommendations:  Patient was admitted for CP/Resp failure w/hypoxia with BNP of  39,640. His MUSHTAQ is elevated from 8 admissions in the last 12 months. He was admitted for CP & respiratory failure w/hypoxia and possible NSTEMI. He had an angio on 3/12, no intervention required.  He has required the use of BiPap. He has dialyzed while admitted on his regular schedule.     Dinorah Hinojosa RN, CTS  Zoe Fish, RN,BSN, CTS  [facility]  Care Coordination  [phone]    AVS/Discharge Summary is the source of truth; this is a helpful guide for improved communication of patient story

## 2018-03-08 NOTE — CONSULTS
"Renal Medicine Inpatient Dialysis Note                                Donald Raza MRN# [id number]   Age: 68 year old YOB: 1950   Date of Admission: 3/8/2018 Hospital LOS: 0          Assessment/Plan:       Admitted with CP and SOB.  Seen regarding management of ESRD.    1.  ESRD   -[facility]   -AVF   -target weight 87 kg (off 03/06/17 at 86.7 kg)  2.  Anemia   -MIHAI  3.  Mineral Bone Disease   -Hectorol  4.  CHF  5.  DM  6.  HTN  7.  HIV      TTHS dialysis  UF to below dry as tolerated  Transfusion of dialysis  Medication changes as outlined by cardiology        Interval History:     Dialysis run parameters reviewed with dialysis RN at patient bedside.  3.5 hour run  via AVF.  UF 3 liter.  EPO and Hectorol on run.     Comfortable.  No CP or SOB at this time       ROS     GENERAL: NAD, No fever,chills  RESP:dyspnea on exertion  CV: intermittent CP  EXT: no change edema  ROS otherwise negative    Dialysis Parameters:     Vitals were reviewed  Patient Vitals for the past 8 hrs:   BP Temp Temp src Heart Rate Resp SpO2 Height Weight   03/08/18 1345 (!) 177/103 - - 88 - 96 % - -   03/08/18 1330 (!) 177/102 - - 88 - 96 % - -   03/08/18 1315 (!) 181/97 - - 89 - 95 % - -   03/08/18 1300 (!) 181/97 - - 91 - 93 % - -   03/08/18 1245 187/88 - - - - - - -   03/08/18 1215 167/84 - - 89 24 97 % - -   03/08/18 1146 173/85 - - 95 29 95 % - -   03/08/18 1100 168/87 - - 89 18 93 % - -   03/08/18 0902 153/74 96.7  F (35.9  C) Oral 88 18 - - -   03/08/18 0900 - - - - - 92 % 1.803 m (5' 11\") 87.4 kg (192 lb 9.6 oz)   03/08/18 0845 161/78 - - 87 16 - - -   03/08/18 0830 167/84 - - 88 18 - - -   03/08/18 0815 166/83 - - 90 - 95 % - -   03/08/18 0800 164/80 - - 90 - 95 % - -   03/08/18 0756 - - - - 16 - - -   03/08/18 0746 - - - 90 - 95 % - -   03/08/18 0745 169/82 - - 93 - 93 % - -   03/08/18 0741 - - - - - - - 87.6 kg (193 lb 2 oz)   03/08/18 0721 - - - - 24 95 % - -   03/08/18 0716 - - - 89 - 93 % - - " "  03/08/18 0715 163/80 - - - - - - -   03/08/18 0645 167/82 - - 90 - 96 % - -   03/08/18 0630 174/83 - - 90 - 96 % - -   03/08/18 0615 165/85 - - 91 - 97 % - -   03/08/18 0601 - - - - - 97 % - -   03/08/18 0600 170/81 - - - - 92 % - -   03/08/18 0554 - - - 97 - 91 % - -   03/08/18 0553 - - - 95 - 92 % - -          Vitals:    03/08/18 0741 03/08/18 0900   Weight: 87.6 kg (193 lb 2 oz) 87.4 kg (192 lb 9.6 oz)       Current Weight: 87.4  Dry Weight: outpatient 87  Dialysis Temp: 36.5  C  Access Device: AVF  Access Site: right  Dialyzer: Revaclear  Dialysis Bath: 3  Sodium Profile: n  UF Goal: 3  Blood Flow Rate (mL/min): 400  Total Treatment Time (hrs): 3.5  Heparin: Low dose as required      EPO dose: y  Zemplar: y  IV Fe: n      Medications and Allergies:     Reviewed      Physical Exam:     Seen and examined during course of dialysis run    BP (!) 177/102  Temp 96.7  F (35.9  C) (Oral)  Resp 24  Ht 1.803 m (5' 11\")  Wt 87.4 kg (192 lb 9.6 oz)  SpO2 96%  BMI 26.86 kg/m2    GENERAL: awake, alert, follows  HEENT: NC/AT, PERRLA, EOMI, non icteric, pharynx moist without lesion  NECK: supple, no masses or adenopathy  RESP: clear  CV: RRR, normal S1 S2  ABDOMEN: S/NT, BS present  MS: no edema  EXT: access canulated without issue  NEURO: speech normal and cranial nerves 2-12 intact  PSYCH: affect normal/bright    Data:         Recent Labs  Lab 03/08/18  0542      POTASSIUM 3.8   CHLORIDE 102   CO2 25   ANIONGAP 11   *   BUN 61*   CR 8.08*   GFRESTIMATED 7*   GFRESTBLACK 8*   PRABHA 9.2       Recent Labs  Lab 03/08/18  0542   HGB 7.1*         [facility] - Nephrology  [phone]          "

## 2018-03-08 NOTE — PLAN OF CARE
"Problem: Patient Care Overview  Goal: Plan of Care/Patient Progress Review  Outcome: Improving  ROOM # 307  Living Situation (if not independent, order SW consult): home w/spouse and daughter. Dialysis runs in [place].   Activity level at baseline: independent w/cane  Activity level on admit: assist 1 w/cane  Patient given Patient Bill of Rights; given the opportunity to ask questions about status and their plan of care.  Patient has been oriented to the room, bathroom and call light is in place.  Discussed discharge goals and expectations with patient/family.   Upon admit, Pt alert/oriented. BP systolic 150/160's. Cardiology consult & med recommendations.  Nitro gtt infusing until weaned off at 1140. On admit, Chest pain anterior mid sternal 1/10 \"pressure\" but \"way better\". Glucose 150. Voided 75ml, po intake good. 2gm na diet. Plan for dialysis today with blood transfusion on the run.  Per cardiology maintain hgb >8.  Addendum 1500:  Blood given during dialysis. Run to end at 1630. Pt stable on run.       "

## 2018-03-08 NOTE — PROGRESS NOTES
Patient placed on BIPAP 10/5 40% in ER for increased WOB. Received 6mL duoneb. BS diminished. Patient states he feels much better after BIPAP initiated. RT will continue to monitor.    RT Lisette 3/8/2018, 6:06 AM

## 2018-03-08 NOTE — PROGRESS NOTES
Patient was seen in dialysis again feels a little better.  I spoke with nephrologist and 1 unit of blood was ordered and patient to be followed closely.  Cardiology already seen patient and recommendation noted.

## 2018-03-08 NOTE — H&P
History and Physical     Donald Raza MRN# [id number]   YOB: 1950 Age: 68 year old      Date of Admission:  3/8/2018    Primary care provider: Aida Thomas          Assessment and Plan:   Donald Raza is a 68 year old male with a PMH significant for CAD, ESRD on dialysis, DM, HTN, HLP, pulmonary HTN and HIV, who presents with chest pain and SOB.     1. Chest pain - last cath 10/2017, stable disease. LAD initially stented in 6/2015, ostial diagonal stent and LAD angioplasty in 12/2015, 5/2016 angioplasty on diagonal branch of LAD due to restenosis and lastly LAD stent in 8/2016. Woke up today with chest pressure, similar to previous episodes needing stenting. Received SL nitro x3  By EMS without resolution of pain, started on nitro gtt in ED with significant improvement, continue. EKG is non ischemic. Troponin is detectable but still within normal ranges. Tele, serial troponins, echocardiogram and cardiology consult. Resume home Plavix, ARB, Imdur and statin.   2. Acute respiratory failure with hypoxia - SpO2 83% on room air, required BiPAP for a short time in ED with significant improvement, titrated down to nc at 3L. Not significantly fluid overloaded on exam, trace bilateral LE edema which pt states is baseline, lung sound are clear to me, pt states he is at his dry weight. BNP [place] [address] which seems to be on the high end for him, CXR shows pulmonary edema, and hypoxia (improved without diuresis). Denies fever or chills, does endorse recent dry cough, now improved, denies recent medication changes or diet indiscretions. Continue to wean O2 as able. Due for dialysis today, will hold off on lasix.  3. ESRD on dialysis - due for run today. States he is at his dry weight. Still produces some urine. Consult nephrology for dialysis.   4. DM - managed on Lantus 50 units daily and Humalog 15 units TID with additional 2 units for glucose over 150. Will resume lantus and meal time insulin  at 10 units TID with SSI. Glucose check 4 times daily.   5. HTN - BP significantly elevated on arrival, 204/106. Improved to 160s/80s. Resume home meds. On nitro gtt  6. HLP - resume statin  7. HIV - resume home meds. No evidence of acute infection but would have a low threshold to consult Infectious Disease.   8. Chronic anemia - due to CKD. Hgb 7.1 on admission. Baseline appears to range between 7-8. Has required transfusions in the past and has received Epogen with dialysis in the past. No evidence of acute bleeding. Recheck Hgb tomorrow.     Prophylaxis - on Plavix, mechanical  Code status - Full Code  Dispo - admit to inpatient, anticipate at least 2 nights stay                Chief Complaint:   Chest pain, SOB         History of Present Illness:   Donald Raza is a 68 year old male with a PMH significant for CAD, ESRD on dialysis, DM, HTN, HLP, pulmonary HTN and HIV, who presents with chest pain and SOB.  Patient reports he felt well before going to bed last night evening but woke up around 330 this morning with chest pressure, stating it felt like someone was squeezing his chest.  He also noted feeling short of breath.  He denies nausea or diaphoresis.  He denies recent medication changes or diet indiscretions.  He reports that the pain radiated into his upper abdomen but denies radiation to his neck or arm.  He received a full-strength aspirin and 3 nitro by EMS prior to arrival.  He denies increasing lower extremity edema, stating that they are at baseline.  He denies significant shortness of breath upon my examination.  He states he is at his dry weight currently.  He is on dialysis 3 times a week and is due for dialysis today.  He does note a recent cough but states that this has been a dry cough.  He denies fever, chills, vomiting, diarrhea or dysuria.  He denies missing any scheduled dialysis runs.  In the ED, hypertensive on arrival, 204/106, , afebrile, R 24 and 83% on room air. He was briefly  placed on BiPAP in the ED with significant improvement. They were able to discontinue BiPAP and place him on 5L via NC and wean down to 3L. CMP - Cr. 8.08, BUN 61, glucose 161 otherwise unremarkable. BNP 29964, troponin 0.043. CBC - WBC 4.7, Hgb 7.1 and platelets 188. CXR shows mildly increased interstitial opacities in both lungs suspicious for pulmonary edema. EKG NSR. He was given a Duoneb and started on a Nitro gtt. He will be admitted inpatient for further work up and management of his sx.     Hx obtained by speaking with ED physician, chart review and pt interview.               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, recurrent     Bradycardia 5/28/2016     CAD S/P percutaneous coronary angioplasty 6/15/2015     Chest pain      CKD (chronic kidney disease)     Hemodialysis     Dilated aortic root (H) 5/6/2016     ESRD (end stage renal disease) on dialysis (H) 5/6/2016     Hemodialysis-associated hypotension 5/22/2016     Human immunodeficiency virus (HIV) disease      Hypertension 2010     Hypotension, unspecified hypotension type 5/22/2016     Impotence of organic origin      Increased prostate specific antigen (PSA) velocity 08/08/2016    Awaiting bx on blood thinner     Mixed hyperlipidemia      Near syncope 2016    with hemodialysis     NSTEMI (non-ST elevated myocardial infarction) (H) 12/2015, 5/2016     Pulmonary HTN     Mod     TIA (transient ischemic attack) 5/2016     Type 2 diabetes mellitus (H) age 50     Unstable angina (H) 3/4/2016               Past Surgical History:     Past Surgical History:   Procedure Laterality Date     ANGIOGRAM  03-04-16    No culprit lesions, stents widely patent      ANGIOGRAM  05-06-16    Cutting balloon ptca=Diag     APPENDECTOMY  2000     CHOLECYSTECTOMY, LAPOROSCOPIC        COLOSTOMY  09/30/1999    Temporary for diverticulitis     HEART CATH, ANGIOPLASTY  08-18-16    LAD PCI. Stented with a 3.0 x 8 mm Xience Alpine stent.     STENT, CORONARY, S660 15/18  12/2015    VANITA=Diag, PTCA=LAD     STENT, CORONARY, S660 15/18  06/2015    VANITA=LAD               Social History:     Social History     Social History     Marital status:      Spouse name: N/A     Number of children: N/A     Years of education: N/A     Occupational History     Not on file.     Social History Main Topics     Smoking status: Former Smoker     Packs/day: 2.00     Years: 41.00     Types: Cigarettes     Quit date: 2003     Smokeless tobacco: Never Used     Alcohol use No     Drug use: No     Sexual activity: Not on file     Other Topics Concern     Parent/Sibling W/ Cabg, Mi Or Angioplasty Before 65f 55m? Yes     Caffeine Concern Yes     2 cups daily     Sleep Concern Yes     Special Diet No      more proteins     Exercise Yes     Cardiac rehab      Social History Narrative               Family History:     Family History   Problem Relation Age of Onset     HEART DISEASE Brother 40     CABG     KIDNEY DISEASE Sister      Hypertension Sister      HEART DISEASE Brother      Dilated aorta              Allergies:      Allergies   Allergen Reactions     Calcium Acetate Other (See Comments)     Other reaction(s): Other, see comments  Pain in chest and back  Pain in chest area (sensitive skin)      Diagnostic X-Ray Materials Other (See Comments)     PN: renal failure     Lisinopril      Sulfa Drugs                Medications:     Prior to Admission medications    Medication Sig Last Dose Taking? Auth Provider   ipratropium - albuterol 0.5 mg/2.5 mg/3 mL (DUONEB) 0.5-2.5 (3) MG/3ML neb solution Take 1 vial (3 mLs) by nebulization every 6 hours as needed for shortness of breath / dyspnea or wheezing  Yes Catrachito Rosado, DO   Acetaminophen (TYLENOL PO) Take by mouth as needed for mild pain or fever  Yes Unknown,  Entered By History   guaiFENesin (MUCINEX) 600 MG 12 hr tablet Take by mouth as needed for congestion  Yes Unknown, Entered By History   omega-3 acid ethyl esters (LOVAZA) 1 G capsule Take 2 g by mouth 2 times daily 3/7/2018 Yes Unknown, Entered By History   losartan (COZAAR) 50 MG tablet Take 1 tablet (50 mg) by mouth every evening 3/7/2018 Yes Yuki Hinojosa, DEDRICK GUZMAN   Isosorbide Mononitrate  MG TB24 Take 1 tablet (120 mg) by mouth every evening 3/7/2018 Yes Yuki Hinojosa, DEDRIKC GUZMAN   insulin glargine (LANTUS) 100 UNIT/ML injection Inject 50 Units Subcutaneous daily Takes about noon 3/7/2018 Yes Unknown, Entered By History   HYDRALAZINE HCL PO Take 25 mg by mouth 2 times daily 3/7/2018 Yes Unknown, Entered By History   mycophenolate (GENERIC EQUIVALENT) 500 MG tablet Take 1 tablet (500 mg) by mouth 2 times daily On an empty stomach 3/7/2018 Yes Sherice Bernardo MD   AMLODIPINE BESYLATE PO Take 5 mg by mouth daily  3/7/2018 Yes Unknown, Entered By History   Insulin Lispro (HUMALOG PEN SC) Take 15 units TID and an additional 2 units if BG is over 150 3/7/2018 Yes Unknown, Entered By History   calcium carbonate (TUMS) 500 MG chewable tablet Take 3 chew tab by mouth 4 times daily  3/7/2018 Yes Unknown, Entered By History   B COMPLEX-C-FOLIC ACID PO Take 1 tablet by mouth At Bedtime  3/7/2018 Yes Reported, Patient   albuterol (PROAIR HFA/PROVENTIL HFA/VENTOLIN HFA) 108 (90 BASE) MCG/ACT Inhaler Inhale 2 puffs into the lungs every 6 hours as needed for shortness of breath / dyspnea or wheezing  Yes Nelson Worthy MD   Nutritional Supplements (NEPRO PO) 1-3 times daily  Yes Unknown, Entered By History   omeprazole (PRILOSEC) 40 MG capsule Take 40 mg by mouth daily 1 hour before dinner 3/7/2018 Yes Unknown, Entered By History   order for DME Equipment being ordered: Other: 4WW  Treatment Diagnosis: Decreased activity tolerance  Yes Lorna Jhaveri MD   gabapentin (NEURONTIN) 300 MG capsule Take  "600 mg by mouth every evening 3/7/2018 Yes Unknown, Entered By History   imiquimod (ALDARA) 5 % cream Apply topically as needed  Yes Reported, Patient   DOXERCALCIFEROL IV Inject 6 mcg into the vein three times a week (with dialysis)  Yes Reported, Patient   CLONAZEPAM PO Take 0.5 mg by mouth nightly as needed for anxiety (restless legs)  Yes Unknown, Entered By History   CLOPIDOGREL BISULFATE PO Take 75 mg by mouth every evening  3/7/2018 Yes Unknown, Entered By History   abacavir (ZIAGEN) 300 MG tablet Take 600 mg by mouth every evening  3/7/2018 Yes Abstract, Provider   chlorhexidine (CHLORHEXIDINE) 0.12 % solution Rinse and gargle 15 ml by mouth twice a day as directed. 3/7/2018 Yes Abstract, Provider   terbinafine (LAMISIL AT) 1 % cream Apply topically 2 times daily as needed   Yes Abstract, Provider   atorvastatin (LIPITOR) 40 MG tablet Take 40 mg by mouth At Bedtime 3/7/2018 Yes Unknown, Entered By History   epoetin brittni (EPOGEN,PROCRIT) 28194 UNIT/ML injection Inject 11,000 Units Subcutaneous three times a week WITH DIALYSIS  Yes Unknown, Entered By History   iron sucrose (VENOFER) 20 MG/ML injection Inject 50 mg into the vein once a week WIth dialysis  Yes Unknown, Entered By History   MAGNESIUM OXIDE PO Take 400 mg by mouth At Bedtime 3/7/2018 Yes Unknown, Entered By History   dolutegravir (TIVICAY) 50 MG tablet Take 50 mg by mouth At Bedtime 3/7/2018 Yes Unknown, Entered By History   nitroglycerin (NITROSTAT) 0.4 MG SL tablet One tablet under the tongue every 5 minutes if needed for chest pain. May repeat every 5 minutes for a maximum of 3 doses in 15 minutes\"  Yes Lay Paz MD   gabapentin (NEURONTIN) 300 MG capsule Take 300 mg by mouth every morning  3/7/2018 Yes Unknown, Entered By History   dapsone 100 MG tablet Take 100 mg by mouth At Bedtime  3/7/2018 Yes Reported, Patient   Darunavir Ethanolate (PREZISTA PO) Take 800 mg by mouth At Bedtime. 3/7/2018 Yes Reported, Patient   ritonavir (NORVIR) " "100 MG capsule Take 1 capsule by mouth At Bedtime  3/7/2018 Yes Reported, Patient   benzonatate (TESSALON) 100 MG capsule Take 1 capsule (100 mg) by mouth 3 times daily as needed for cough   TongEzra MD              Review of Systems:   A Comprehensive greater than 10 system review of systems was carried out.  Pertinent positives and negatives are noted above.  Otherwise negative for contributory information.     Review Of Systems  Skin: negative  Eyes: negative  Ears/Nose/Throat: negative  Respiratory: No shortness of breath, dyspnea on exertion, cough, or hemoptysis  Cardiovascular: negative  Gastrointestinal: negative  Genitourinary: negative  Musculoskeletal: negative  Neurologic: negative  Psychiatric: negative  Hematologic/Lymphatic/Immunologic: negative  Endocrine: negative             Physical Exam:   Blood pressure 153/74, temperature 96.7  F (35.9  C), temperature source Oral, resp. rate 18, height 1.803 m (5' 11\"), weight 87.4 kg (192 lb 9.6 oz), SpO2 92 %.  Exam:  GENERAL:  Comfortable.  PSYCH: pleasant, oriented, No acute distress.  HEENT:  Atraumatic, normocephalic. Normal conjunctiva, normal hearing, and oropharynx is normal.  NECK:  Supple, no neck vein distention  HEART:  Normal S1, S2 with no murmur, no pericardial rub, gallops or S3 or S4.  LUNGS:  Clear to auscultation, normal Respiratory effort. No wheezing, rales or ronchi.  GI:  Soft, normal bowel sounds. Non-tender, non distended.   EXTREMITIES:  Trace bilateral LE edema, +2 pulses bilateral and equal.  SKIN:  Dry to touch, No rash, wound or ulcerations.  NEUROLOGIC:  CN 2-12 intact, BL 5/5 symmetric upper and lower extremity strength, sensation is intact with no focal deficits.               Data:       Recent Labs  Lab 03/08/18  0542   NTBNPI [id number]*       Recent Labs  Lab 03/08/18  0542   WBC 4.7   HGB 7.1*   HCT 22.9*   MCV 94          Recent Labs  Lab 03/08/18  0542      POTASSIUM 3.8   CHLORIDE 102   CO2 25 "   ANIONGAP 11   *   BUN 61*   CR 8.08*   GFRESTIMATED 7*   GFRESTBLACK 8*   PRABHA 9.2   PROTTOTAL 7.3   ALBUMIN 3.5   BILITOTAL 0.5   ALKPHOS 125   AST 18   ALT 16       Recent Labs  Lab 03/08/18  0542   TROPONIN 0.02   TROPI 0.043       Recent Results (from the past 48 hour(s))   Chest  XR, 1 view PORTABLE    Narrative    CHEST SINGLE VIEW PORTABLE  3/8/2018 5:54 AM     HISTORY: Shortness of breath.    COMPARISON: 2/1/2018.    FINDINGS: Mildly increased interstitial opacities in both lungs.  Borderline cardiomegaly. Atherosclerotic calcification in the thoracic  aorta. A vascular stent is present in the left axillary region.      Impression    IMPRESSION: Mildly increased interstitial opacities in both lungs,  suspicious for pulmonary edema.    MD Noemi CUEVAS PA-C    This patient was seen and discussed with Dr. Do who agrees with the current plans as outlined above.

## 2018-03-08 NOTE — PROGRESS NOTES
CM observed patient's MUSHTAQ score as very high. He has been admitted to [facility] with multiple diagnoses. He lives with spouse, brother & adult children. His daughter is his PCA. He dialyzes at [facility] on [facility]. CM will continue to monitor for any additional needs.     CM will continue to follow patient until discharge for any additional needs.     Ruby Hinojosa RN, BSN, CTS  [facility]  [phone]

## 2018-03-08 NOTE — PHARMACY-ADMISSION MEDICATION HISTORY
Admission medication history interview status for this patient is complete. See [facility] admission navigator for allergy information, prior to admission medications and immunization status.     Medication history interview source(s):Patient  Medication history resources (including written lists, pill bottles, clinic record):None    Changes made to PTA medication list:  Added: none  Deleted: Augmentin and prednisone (therapy completed)  Changed: none  For patients on insulin therapy: Y   Lantus/levemir/NPH/Mix 70/30 dose: Lantus (see Med list for doses)   Sliding scale Novolog: No  If Yes, do you have a baseline novolog pre-meal dose:  15 units with meals, takes additional 2 units if BG is over 150  Patients eat three meals a day:   -  How many episodes of hypoglycemia do you have per week: -   How many missed doses do you have per week: -  How many times do you check your blood glucose per day: -     Any Barriers to therapy - Be specific :  cost of medications, comfortable with giving injections (if applicable), comfortable and confident with current diabetes regimen: unable to assess    Actions taken by pharmacist (provider contacted, etc):None     Additional medication history information:None    Medication reconciliation/reorder completed by provider prior to medication history? No    Prior to Admission medications    Medication Sig Last Dose Taking? Auth Provider   ipratropium - albuterol 0.5 mg/2.5 mg/3 mL (DUONEB) 0.5-2.5 (3) MG/3ML neb solution Take 1 vial (3 mLs) by nebulization every 6 hours as needed for shortness of breath / dyspnea or wheezing  Yes Catrachito Rosado, DO   Acetaminophen (TYLENOL PO) Take by mouth as needed for mild pain or fever  Yes Unknown, Entered By History   guaiFENesin (MUCINEX) 600 MG 12 hr tablet Take by mouth as needed for congestion  Yes Unknown, Entered By History   omega-3 acid ethyl esters (LOVAZA) 1 G capsule Take 2 g by mouth 2 times daily 3/7/2018 Yes Unknown, Entered  By History   losartan (COZAAR) 50 MG tablet Take 1 tablet (50 mg) by mouth every evening 3/7/2018 Yes Yuki Hinojosa APRN CNP   Isosorbide Mononitrate  MG TB24 Take 1 tablet (120 mg) by mouth every evening 3/7/2018 Yes Yuki Hinojosa, DEDRICK GUZMAN   insulin glargine (LANTUS) 100 UNIT/ML injection Inject 50 Units Subcutaneous daily Takes about noon 3/7/2018 Yes Unknown, Entered By History   HYDRALAZINE HCL PO Take 25 mg by mouth 2 times daily 3/7/2018 Yes Unknown, Entered By History   mycophenolate (GENERIC EQUIVALENT) 500 MG tablet Take 1 tablet (500 mg) by mouth 2 times daily On an empty stomach 3/7/2018 Yes Sherice Bernardo MD   AMLODIPINE BESYLATE PO Take 5 mg by mouth daily  3/7/2018 Yes Unknown, Entered By History   Insulin Lispro (HUMALOG PEN SC) Take 15 units TID and an additional 2 units if BG is over 150 3/7/2018 Yes Unknown, Entered By History   calcium carbonate (TUMS) 500 MG chewable tablet Take 3 chew tab by mouth 4 times daily  3/7/2018 Yes Unknown, Entered By History   B COMPLEX-C-FOLIC ACID PO Take 1 tablet by mouth At Bedtime  3/7/2018 Yes Reported, Patient   albuterol (PROAIR HFA/PROVENTIL HFA/VENTOLIN HFA) 108 (90 BASE) MCG/ACT Inhaler Inhale 2 puffs into the lungs every 6 hours as needed for shortness of breath / dyspnea or wheezing  Yes Nelson oWrthy MD   Nutritional Supplements (NEPRO PO) 1-3 times daily  Yes Unknown, Entered By History   omeprazole (PRILOSEC) 40 MG capsule Take 40 mg by mouth daily 1 hour before dinner 3/7/2018 Yes Unknown, Entered By History   order for DME Equipment being ordered: Other: 4WW  Treatment Diagnosis: Decreased activity tolerance  Yes Lorna Jhaveri MD   gabapentin (NEURONTIN) 300 MG capsule Take 600 mg by mouth every evening 3/7/2018 Yes Unknown, Entered By History   imiquimod (ALDARA) 5 % cream Apply topically as needed  Yes Reported, Patient   DOXERCALCIFEROL IV Inject 6 mcg into the vein three times a week (with dialysis)  Yes  "Reported, Patient   CLONAZEPAM PO Take 0.5 mg by mouth nightly as needed for anxiety (restless legs)  Yes Unknown, Entered By History   CLOPIDOGREL BISULFATE PO Take 75 mg by mouth every evening  3/7/2018 Yes Unknown, Entered By History   abacavir (ZIAGEN) 300 MG tablet Take 600 mg by mouth every evening  3/7/2018 Yes Abstract, Provider   chlorhexidine (CHLORHEXIDINE) 0.12 % solution Rinse and gargle 15 ml by mouth twice a day as directed. 3/7/2018 Yes Abstract, Provider   terbinafine (LAMISIL AT) 1 % cream Apply topically 2 times daily as needed   Yes Abstract, Provider   atorvastatin (LIPITOR) 40 MG tablet Take 40 mg by mouth At Bedtime 3/7/2018 Yes Unknown, Entered By History   epoetin brittni (EPOGEN,PROCRIT) 35126 UNIT/ML injection Inject 11,000 Units Subcutaneous three times a week WITH DIALYSIS  Yes Unknown, Entered By History   iron sucrose (VENOFER) 20 MG/ML injection Inject 50 mg into the vein once a week WIth dialysis  Yes Unknown, Entered By History   MAGNESIUM OXIDE PO Take 400 mg by mouth At Bedtime 3/7/2018 Yes Unknown, Entered By History   dolutegravir (TIVICAY) 50 MG tablet Take 50 mg by mouth At Bedtime 3/7/2018 Yes Unknown, Entered By History   nitroglycerin (NITROSTAT) 0.4 MG SL tablet One tablet under the tongue every 5 minutes if needed for chest pain. May repeat every 5 minutes for a maximum of 3 doses in 15 minutes\"  Yes Lay Paz MD   gabapentin (NEURONTIN) 300 MG capsule Take 300 mg by mouth every morning  3/7/2018 Yes Unknown, Entered By History   dapsone 100 MG tablet Take 100 mg by mouth At Bedtime  3/7/2018 Yes Reported, Patient   Darunavir Ethanolate (PREZISTA PO) Take 800 mg by mouth At Bedtime. 3/7/2018 Yes Reported, Patient   ritonavir (NORVIR) 100 MG capsule Take 1 capsule by mouth At Bedtime  3/7/2018 Yes Reported, Patient   benzonatate (TESSALON) 100 MG capsule Take 1 capsule (100 mg) by mouth 3 times daily as needed for cough   Ezra Fortune MD           "

## 2018-03-08 NOTE — IP AVS SNAPSHOT
[place] [address] [facility]    [address]    [facility] [address]    Phone:  [phone]    Fax:  [phone]                                       After Visit Summary   3/8/2018    Donald Raza    MRN: [id number]           After Visit Summary Signature Page     I have received my discharge instructions, and my questions have been answered. I have discussed any challenges I see with this plan with the nurse or doctor.    ..........................................................................................................................................  Patient/Patient Representative Signature      ..........................................................................................................................................  Patient Representative Print Name and Relationship to Patient    ..................................................               ................................................  Date                                            Time    ..........................................................................................................................................  Reviewed by Signature/Title    ...................................................              ..............................................  Date                                                            Time

## 2018-03-08 NOTE — PROGRESS NOTES
Potassium   Date Value Ref Range Status   03/08/2018 3.8 3.4 - 5.3 mmol/L Final     Lab Results   Component Value Date    HGB 7.1 03/08/2018     Weight: 87.4 kg (192 lb 9.6 oz)  POST WT 84.9kg    DIALYSIS PROCEDURE NOTE  Hepatitis status of previous patient on machine log was checked and verified ok to use with this patients hepatitis status.    Patient dialyzed for 3.5 hrs. on a 3 K bath with a net fluid removal of  2.5L.  A BFR of 400 ml/min was obtained via a LUE AVF using 15 gauge needles.    The patient was seen by Dr. Fuller during the treatment.  Total heparin received during the treatment: 0 units. Sites held x 10 min then  pressure drsgs applied.      Meds  Given: RBC, Epogen & Hectorol, see MAR     Complications: none.      Procedure and ESRD teaching done and questions answered. See flowsheet in EPIC for further details and post assessment.    Machine water alarm in place and functioning. Chlorine/Chloramine water system checked every 4 hours.    Pt returned via wheelchair, A&Ox4, calm, cooperative, denies pain    Vascular Access: Aseptic prep done for both on/off.    Report received from: BRAULIO Danielson RN    Report given to: PEDRO Reilly RN    HEPATITIS B SURFACE ANTIGEN negative DATE 1/20/18 HEPATITIS B SURFACE ANTIBODY immune DATE 1/20/18    Outpatient Dialysis at [facility]    Laura Pavon RN

## 2018-03-09 ENCOUNTER — APPOINTMENT (OUTPATIENT)
Dept: GENERAL RADIOLOGY | Facility: CLINIC | Age: 68
DRG: 280 | End: 2018-03-09
Attending: INTERNAL MEDICINE
Payer: MEDICARE

## 2018-03-09 ENCOUNTER — APPOINTMENT (OUTPATIENT)
Dept: CARDIOLOGY | Facility: CLINIC | Age: 68
DRG: 280 | End: 2018-03-09
Attending: PHYSICIAN ASSISTANT
Payer: MEDICARE

## 2018-03-09 LAB
ALBUMIN SERPL-MCNC: 3.1 G/DL (ref 3.4–5)
ALP SERPL-CCNC: 104 U/L (ref 40–150)
ALT SERPL W P-5'-P-CCNC: 15 U/L (ref 0–70)
ANION GAP SERPL CALCULATED.3IONS-SCNC: 5 MMOL/L (ref 3–14)
ANION GAP SERPL CALCULATED.3IONS-SCNC: 8 MMOL/L (ref 3–14)
AST SERPL W P-5'-P-CCNC: 16 U/L (ref 0–45)
BASOPHILS # BLD AUTO: 0 10E9/L (ref 0–0.2)
BASOPHILS NFR BLD AUTO: 0.5 %
BILIRUB SERPL-MCNC: 0.6 MG/DL (ref 0.2–1.3)
BUN SERPL-MCNC: 36 MG/DL (ref 7–30)
BUN SERPL-MCNC: 44 MG/DL (ref 7–30)
CALCIUM SERPL-MCNC: 8.8 MG/DL (ref 8.5–10.1)
CALCIUM SERPL-MCNC: 8.8 MG/DL (ref 8.5–10.1)
CHLORIDE SERPL-SCNC: 100 MMOL/L (ref 94–109)
CHLORIDE SERPL-SCNC: 101 MMOL/L (ref 94–109)
CO2 SERPL-SCNC: 29 MMOL/L (ref 20–32)
CO2 SERPL-SCNC: 30 MMOL/L (ref 20–32)
CREAT SERPL-MCNC: 4.83 MG/DL (ref 0.66–1.25)
CREAT SERPL-MCNC: 5.56 MG/DL (ref 0.66–1.25)
DIFFERENTIAL METHOD BLD: ABNORMAL
EOSINOPHIL # BLD AUTO: 0.1 10E9/L (ref 0–0.7)
EOSINOPHIL NFR BLD AUTO: 0.8 %
ERYTHROCYTE [DISTWIDTH] IN BLOOD BY AUTOMATED COUNT: 15.6 % (ref 10–15)
ERYTHROCYTE [DISTWIDTH] IN BLOOD BY AUTOMATED COUNT: 15.7 % (ref 10–15)
GFR SERPL CREATININE-BSD FRML MDRD: 10 ML/MIN/1.7M2
GFR SERPL CREATININE-BSD FRML MDRD: 12 ML/MIN/1.7M2
GLUCOSE BLDC GLUCOMTR-MCNC: 120 MG/DL (ref 70–99)
GLUCOSE BLDC GLUCOMTR-MCNC: 122 MG/DL (ref 70–99)
GLUCOSE BLDC GLUCOMTR-MCNC: 130 MG/DL (ref 70–99)
GLUCOSE BLDC GLUCOMTR-MCNC: 179 MG/DL (ref 70–99)
GLUCOSE BLDC GLUCOMTR-MCNC: 237 MG/DL (ref 70–99)
GLUCOSE BLDC GLUCOMTR-MCNC: 89 MG/DL (ref 70–99)
GLUCOSE SERPL-MCNC: 131 MG/DL (ref 70–99)
GLUCOSE SERPL-MCNC: 138 MG/DL (ref 70–99)
HCT VFR BLD AUTO: 25.1 % (ref 40–53)
HCT VFR BLD AUTO: 29.3 % (ref 40–53)
HGB BLD-MCNC: 7.5 G/DL (ref 13.3–17.7)
HGB BLD-MCNC: 9.2 G/DL (ref 13.3–17.7)
IMM GRANULOCYTES # BLD: 0.1 10E9/L (ref 0–0.4)
IMM GRANULOCYTES NFR BLD: 1.2 %
LACTATE BLD-SCNC: 1 MMOL/L (ref 0.7–2)
LACTATE BLD-SCNC: NORMAL MMOL/L (ref 0.7–2)
LMWH PPP CHRO-ACNC: 0.11 IU/ML
LMWH PPP CHRO-ACNC: <0.1 IU/ML
LYMPHOCYTES # BLD AUTO: 1 10E9/L (ref 0.8–5.3)
LYMPHOCYTES NFR BLD AUTO: 16 %
MCH RBC QN AUTO: 29 PG (ref 26.5–33)
MCH RBC QN AUTO: 29.7 PG (ref 26.5–33)
MCHC RBC AUTO-ENTMCNC: 29.9 G/DL (ref 31.5–36.5)
MCHC RBC AUTO-ENTMCNC: 31.4 G/DL (ref 31.5–36.5)
MCV RBC AUTO: 95 FL (ref 78–100)
MCV RBC AUTO: 97 FL (ref 78–100)
MONOCYTES # BLD AUTO: 0.5 10E9/L (ref 0–1.3)
MONOCYTES NFR BLD AUTO: 8.2 %
NEUTROPHILS # BLD AUTO: 4.5 10E9/L (ref 1.6–8.3)
NEUTROPHILS NFR BLD AUTO: 73.3 %
NRBC # BLD AUTO: 0 10*3/UL
NRBC BLD AUTO-RTO: 0 /100
PLATELET # BLD AUTO: 174 10E9/L (ref 150–450)
PLATELET # BLD AUTO: 239 10E9/L (ref 150–450)
POTASSIUM SERPL-SCNC: 4.2 MMOL/L (ref 3.4–5.3)
POTASSIUM SERPL-SCNC: 4.3 MMOL/L (ref 3.4–5.3)
PROT SERPL-MCNC: 6.6 G/DL (ref 6.8–8.8)
RBC # BLD AUTO: 2.59 10E12/L (ref 4.4–5.9)
RBC # BLD AUTO: 3.1 10E12/L (ref 4.4–5.9)
SODIUM SERPL-SCNC: 136 MMOL/L (ref 133–144)
SODIUM SERPL-SCNC: 137 MMOL/L (ref 133–144)
TROPONIN I SERPL-MCNC: 0.06 UG/L (ref 0–0.04)
TROPONIN I SERPL-MCNC: 0.07 UG/L (ref 0–0.04)
TROPONIN I SERPL-MCNC: 0.07 UG/L (ref 0–0.04)
WBC # BLD AUTO: 6.1 10E9/L (ref 4–11)
WBC # BLD AUTO: 6.6 10E9/L (ref 4–11)

## 2018-03-09 PROCEDURE — 25000132 ZZH RX MED GY IP 250 OP 250 PS 637: Mod: GY | Performed by: PHYSICIAN ASSISTANT

## 2018-03-09 PROCEDURE — 25000132 ZZH RX MED GY IP 250 OP 250 PS 637: Mod: GY

## 2018-03-09 PROCEDURE — 25000128 H RX IP 250 OP 636

## 2018-03-09 PROCEDURE — A9270 NON-COVERED ITEM OR SERVICE: HCPCS | Mod: GY | Performed by: INTERNAL MEDICINE

## 2018-03-09 PROCEDURE — 71045 X-RAY EXAM CHEST 1 VIEW: CPT

## 2018-03-09 PROCEDURE — 00000146 ZZHCL STATISTIC GLUCOSE BY METER IP

## 2018-03-09 PROCEDURE — 93010 ELECTROCARDIOGRAM REPORT: CPT | Performed by: INTERNAL MEDICINE

## 2018-03-09 PROCEDURE — 25000128 H RX IP 250 OP 636: Performed by: INTERNAL MEDICINE

## 2018-03-09 PROCEDURE — 85025 COMPLETE CBC W/AUTO DIFF WBC: CPT | Performed by: INTERNAL MEDICINE

## 2018-03-09 PROCEDURE — 85520 HEPARIN ASSAY: CPT | Performed by: INTERNAL MEDICINE

## 2018-03-09 PROCEDURE — 99291 CRITICAL CARE FIRST HOUR: CPT | Performed by: INTERNAL MEDICINE

## 2018-03-09 PROCEDURE — A9270 NON-COVERED ITEM OR SERVICE: HCPCS | Mod: GY | Performed by: PHYSICIAN ASSISTANT

## 2018-03-09 PROCEDURE — 93321 DOPPLER ECHO F-UP/LMTD STD: CPT | Mod: 26 | Performed by: INTERNAL MEDICINE

## 2018-03-09 PROCEDURE — 83605 ASSAY OF LACTIC ACID: CPT | Performed by: INTERNAL MEDICINE

## 2018-03-09 PROCEDURE — 99233 SBSQ HOSP IP/OBS HIGH 50: CPT | Performed by: INTERNAL MEDICINE

## 2018-03-09 PROCEDURE — 85027 COMPLETE CBC AUTOMATED: CPT | Performed by: INTERNAL MEDICINE

## 2018-03-09 PROCEDURE — 36415 COLL VENOUS BLD VENIPUNCTURE: CPT | Performed by: INTERNAL MEDICINE

## 2018-03-09 PROCEDURE — 93321 DOPPLER ECHO F-UP/LMTD STD: CPT

## 2018-03-09 PROCEDURE — 93325 DOPPLER ECHO COLOR FLOW MAPG: CPT | Mod: 26 | Performed by: INTERNAL MEDICINE

## 2018-03-09 PROCEDURE — 25000132 ZZH RX MED GY IP 250 OP 250 PS 637: Mod: GY | Performed by: INTERNAL MEDICINE

## 2018-03-09 PROCEDURE — 80048 BASIC METABOLIC PNL TOTAL CA: CPT | Performed by: INTERNAL MEDICINE

## 2018-03-09 PROCEDURE — A9270 NON-COVERED ITEM OR SERVICE: HCPCS | Mod: GY

## 2018-03-09 PROCEDURE — 93005 ELECTROCARDIOGRAM TRACING: CPT

## 2018-03-09 PROCEDURE — 93308 TTE F-UP OR LMTD: CPT | Mod: 26 | Performed by: INTERNAL MEDICINE

## 2018-03-09 PROCEDURE — 84484 ASSAY OF TROPONIN QUANT: CPT | Performed by: INTERNAL MEDICINE

## 2018-03-09 PROCEDURE — 80053 COMPREHEN METABOLIC PANEL: CPT | Performed by: INTERNAL MEDICINE

## 2018-03-09 PROCEDURE — 40000275 ZZH STATISTIC RCP TIME EA 10 MIN

## 2018-03-09 PROCEDURE — 12000047 ZZH R&B IMCU

## 2018-03-09 PROCEDURE — 94660 CPAP INITIATION&MGMT: CPT

## 2018-03-09 RX ORDER — HEPARIN SODIUM 1000 [USP'U]/ML
INJECTION, SOLUTION INTRAVENOUS; SUBCUTANEOUS
Status: DISCONTINUED
Start: 2018-03-09 | End: 2018-03-09 | Stop reason: HOSPADM

## 2018-03-09 RX ORDER — NITROGLYCERIN 20 MG/100ML
.07-2 INJECTION INTRAVENOUS CONTINUOUS
Status: DISCONTINUED | OUTPATIENT
Start: 2018-03-09 | End: 2018-03-11

## 2018-03-09 RX ORDER — ASPIRIN 81 MG/1
162 TABLET, CHEWABLE ORAL ONCE
Status: DISCONTINUED | OUTPATIENT
Start: 2018-03-09 | End: 2018-03-09

## 2018-03-09 RX ORDER — FUROSEMIDE 10 MG/ML
INJECTION INTRAMUSCULAR; INTRAVENOUS
Status: COMPLETED
Start: 2018-03-09 | End: 2018-03-09

## 2018-03-09 RX ORDER — LORAZEPAM 0.5 MG/1
0.5 TABLET ORAL
Status: DISCONTINUED | OUTPATIENT
Start: 2018-03-09 | End: 2018-03-09 | Stop reason: HOSPADM

## 2018-03-09 RX ORDER — FUROSEMIDE 10 MG/ML
40 INJECTION INTRAMUSCULAR; INTRAVENOUS ONCE
Status: COMPLETED | OUTPATIENT
Start: 2018-03-09 | End: 2018-03-09

## 2018-03-09 RX ORDER — SODIUM CHLORIDE 9 MG/ML
INJECTION, SOLUTION INTRAVENOUS CONTINUOUS
Status: DISCONTINUED | OUTPATIENT
Start: 2018-03-09 | End: 2018-03-09 | Stop reason: HOSPADM

## 2018-03-09 RX ORDER — ASPIRIN 81 MG/1
TABLET, CHEWABLE ORAL
Status: COMPLETED
Start: 2018-03-09 | End: 2018-03-09

## 2018-03-09 RX ORDER — FENTANYL CITRATE 50 UG/ML
INJECTION, SOLUTION INTRAMUSCULAR; INTRAVENOUS
Status: DISCONTINUED
Start: 2018-03-09 | End: 2018-03-09 | Stop reason: WASHOUT

## 2018-03-09 RX ORDER — SODIUM CHLORIDE 9 MG/ML
INJECTION, SOLUTION INTRAVENOUS CONTINUOUS
Status: DISCONTINUED | OUTPATIENT
Start: 2018-03-09 | End: 2018-03-15 | Stop reason: ALTCHOICE

## 2018-03-09 RX ORDER — NITROGLYCERIN 0.4 MG/1
0.4 TABLET SUBLINGUAL EVERY 5 MIN PRN
Status: DISCONTINUED | OUTPATIENT
Start: 2018-03-09 | End: 2018-03-16 | Stop reason: HOSPADM

## 2018-03-09 RX ORDER — LORAZEPAM 2 MG/ML
0.5 INJECTION INTRAMUSCULAR
Status: DISCONTINUED | OUTPATIENT
Start: 2018-03-09 | End: 2018-03-09 | Stop reason: HOSPADM

## 2018-03-09 RX ORDER — LIDOCAINE 40 MG/G
CREAM TOPICAL
Status: DISCONTINUED | OUTPATIENT
Start: 2018-03-09 | End: 2018-03-09 | Stop reason: HOSPADM

## 2018-03-09 RX ORDER — LABETALOL HYDROCHLORIDE 5 MG/ML
40 INJECTION, SOLUTION INTRAVENOUS ONCE
Status: COMPLETED | OUTPATIENT
Start: 2018-03-09 | End: 2018-03-09

## 2018-03-09 RX ORDER — HYDROMORPHONE HYDROCHLORIDE 1 MG/ML
INJECTION, SOLUTION INTRAMUSCULAR; INTRAVENOUS; SUBCUTANEOUS
Status: COMPLETED
Start: 2018-03-09 | End: 2018-03-09

## 2018-03-09 RX ORDER — POTASSIUM CHLORIDE 1500 MG/1
20 TABLET, EXTENDED RELEASE ORAL
Status: DISCONTINUED | OUTPATIENT
Start: 2018-03-09 | End: 2018-03-09 | Stop reason: HOSPADM

## 2018-03-09 RX ORDER — HYDROMORPHONE HYDROCHLORIDE 1 MG/ML
0.5 INJECTION, SOLUTION INTRAMUSCULAR; INTRAVENOUS; SUBCUTANEOUS ONCE
Status: COMPLETED | OUTPATIENT
Start: 2018-03-09 | End: 2018-03-09

## 2018-03-09 RX ORDER — LIDOCAINE 40 MG/G
CREAM TOPICAL
Status: DISCONTINUED | OUTPATIENT
Start: 2018-03-09 | End: 2018-03-16 | Stop reason: HOSPADM

## 2018-03-09 RX ADMIN — Medication 40 MG: at 00:54

## 2018-03-09 RX ADMIN — GABAPENTIN 600 MG: 300 CAPSULE ORAL at 20:44

## 2018-03-09 RX ADMIN — HYDRALAZINE HYDROCHLORIDE 25 MG: 25 TABLET ORAL at 10:51

## 2018-03-09 RX ADMIN — HEPARIN SODIUM 1100 UNITS/HR: 10000 INJECTION, SOLUTION INTRAVENOUS at 22:05

## 2018-03-09 RX ADMIN — ASPIRIN 81 MG 81 MG: 81 TABLET ORAL at 00:56

## 2018-03-09 RX ADMIN — HYDROMORPHONE HYDROCHLORIDE 0.5 MG: 1 INJECTION, SOLUTION INTRAMUSCULAR; INTRAVENOUS; SUBCUTANEOUS at 00:25

## 2018-03-09 RX ADMIN — HYDRALAZINE HYDROCHLORIDE 25 MG: 25 TABLET ORAL at 20:44

## 2018-03-09 RX ADMIN — IRBESARTAN 150 MG: 75 TABLET ORAL at 22:24

## 2018-03-09 RX ADMIN — OMEGA-3-ACID ETHYL ESTERS 2 G: 1 CAPSULE, LIQUID FILLED ORAL at 20:44

## 2018-03-09 RX ADMIN — ASPIRIN 81 MG 81 MG: 81 TABLET ORAL at 00:55

## 2018-03-09 RX ADMIN — SODIUM CHLORIDE: 9 INJECTION, SOLUTION INTRAVENOUS at 23:12

## 2018-03-09 RX ADMIN — MAGNESIUM OXIDE TAB 400 MG (241.3 MG ELEMENTAL MG) 400 MG: 400 (241.3 MG) TAB at 22:24

## 2018-03-09 RX ADMIN — NITROGLYCERIN 0.4 MG: 0.4 TABLET SUBLINGUAL at 00:21

## 2018-03-09 RX ADMIN — CLOPIDOGREL 75 MG: 75 TABLET, FILM COATED ORAL at 20:44

## 2018-03-09 RX ADMIN — ATORVASTATIN CALCIUM 40 MG: 40 TABLET, FILM COATED ORAL at 22:24

## 2018-03-09 RX ADMIN — DOLUTEGRAVIR SODIUM 50 MG: 50 TABLET, FILM COATED ORAL at 22:24

## 2018-03-09 RX ADMIN — AMLODIPINE BESYLATE 5 MG: 5 TABLET ORAL at 10:51

## 2018-03-09 RX ADMIN — RITONAVIR 100 MG: 100 TABLET, FILM COATED ORAL at 22:23

## 2018-03-09 RX ADMIN — ISOSORBIDE MONONITRATE 120 MG: 60 TABLET, EXTENDED RELEASE ORAL at 20:44

## 2018-03-09 RX ADMIN — DARUNAVIR 800 MG: 800 TABLET, FILM COATED ORAL at 22:23

## 2018-03-09 RX ADMIN — HEPARIN SODIUM 1100 UNITS/HR: 10000 INJECTION, SOLUTION INTRAVENOUS at 19:22

## 2018-03-09 RX ADMIN — GABAPENTIN 300 MG: 300 CAPSULE ORAL at 10:52

## 2018-03-09 RX ADMIN — HEPARIN SODIUM 950 UNITS/HR: 10000 INJECTION, SOLUTION INTRAVENOUS at 00:45

## 2018-03-09 RX ADMIN — DOLUTEGRAVIR SODIUM 50 MG: 50 TABLET, FILM COATED ORAL at 04:28

## 2018-03-09 RX ADMIN — FUROSEMIDE 40 MG: 10 INJECTION, SOLUTION INTRAMUSCULAR; INTRAVENOUS at 00:25

## 2018-03-09 RX ADMIN — ASPIRIN 325 MG: 325 TABLET, DELAYED RELEASE ORAL at 10:52

## 2018-03-09 RX ADMIN — Medication 0.5 MG: at 00:25

## 2018-03-09 RX ADMIN — ABACAVIR 600 MG: 300 TABLET, FILM COATED ORAL at 20:44

## 2018-03-09 RX ADMIN — FUROSEMIDE: 10 INJECTION, SOLUTION INTRAVENOUS at 00:57

## 2018-03-09 RX ADMIN — Medication 2400 UNITS: at 09:21

## 2018-03-09 RX ADMIN — OMEPRAZOLE 40 MG: 20 CAPSULE, DELAYED RELEASE ORAL at 16:34

## 2018-03-09 RX ADMIN — FUROSEMIDE 40 MG: 10 INJECTION, SOLUTION INTRAMUSCULAR; INTRAVENOUS at 00:15

## 2018-03-09 RX ADMIN — Medication 4800 UNITS: at 00:45

## 2018-03-09 RX ADMIN — NITROGLYCERIN 0.4 MG: 0.4 TABLET SUBLINGUAL at 00:02

## 2018-03-09 RX ADMIN — DAPSONE 100 MG: 100 TABLET ORAL at 22:24

## 2018-03-09 RX ADMIN — Medication 4000 UNITS: at 23:13

## 2018-03-09 ASSESSMENT — ACTIVITIES OF DAILY LIVING (ADL)
ADLS_ACUITY_SCORE: 11
ADLS_ACUITY_SCORE: 11
ADLS_ACUITY_SCORE: 12
ADLS_ACUITY_SCORE: 11
ADLS_ACUITY_SCORE: 12
ADLS_ACUITY_SCORE: 11

## 2018-03-09 ASSESSMENT — PAIN DESCRIPTION - DESCRIPTORS: DESCRIPTORS: DISCOMFORT

## 2018-03-09 NOTE — PLAN OF CARE
Problem: Patient Care Overview  Goal: Plan of Care/Patient Progress Review  Transfers SBA.  A/Ox4.  BP has been hypertensive this shift.  Pt got some of his BP meds late today as he was in dialysis.  Pt had 2.5L removed.   Nephrology following.  Tele SR/ST.  Pt denies pain.  Cardiology saw pt see their note for POC.

## 2018-03-09 NOTE — PROGRESS NOTES
Hospitalist Progress Note  [facility]      Ronal Do MD 03/09/2018      Reason for Stay / current hospital problem list:    Acute coronary syndrome    Acute respiratory failure    End-stage renal disease on hemodialysis    HIV-AIDS           Assessment and Plan:        Summary of Stay:       Donald Raza is a 68 year old male with a PMH significant for CAD, ESRD on dialysis, DM, HTN, HLP, pulmonary HTN and HIV, who presents with chest pain and SOB.      1.  Acute coronary syndrome presented with chest pain requiring a nitroglycerin drip   --Patient was started on heparin, cardiology was consulted and plan is for coronary angiogram today or tomorrow, patient is currently stable, care plan discussed with patient and treatment team.  Continue current medications    2. Acute respiratory failure with hypoxia   --Multifactorial secondary to pulmonary edema, acute coronary syndrome, fluid overload patient had   --Patient is requiring oxygen supplement, had to dialysis transorally, nephrology following.  Received high-dose Lasix as well  --Patient may require oxygen on discharge if he qualifies for home oxygen  3. ESRD on dialysis -getting dialysis per nephrology recommendations     4. DM - managed on Lantus 50 units daily and Humalog 15 units TID with additional 2 units for glucose over 150. --Blood sugar is improving, continue current insulin regimen and titrate as needed     5. HTN - BP significantly elevated on arrival, 204/106. Improved to 160s/80s.  Continue nitroglycerin drip   6. HLP -  statin  7. HIV -continue home meds. No evidence of acute infection but would have a low threshold to consult Infectious Disease.   8. Chronic anemia -patient was anemic at hemoglobin of 7.1 and 1 unit of blood was transfused during dialysis yesterday, continue to monitor hemoglobin and transfuse if less than 8 per cardiology recommendation.     # Pain Assessment:   Current Pain Score 3/9/2018 3/9/2018  3/9/2018   Patient currently in pain? denies yes yes   Pain score (0-10) - 2 3   Pain location - Ankle Chest   Pain descriptors - Discomfort Discomfort   CPOT pain score - - -   Donald pa pain level was assessed and he currently denies pain.            DVT Prophylaxis: Heparin        Code Status: Full Code      Discharge Plan:Expected discharge home in the next 2 days after coronary angiogram and further recommendation by cardiology t obtained        Interval History (Subjective):             Patient was seen and examined by me today and medical record reviewed.Overnight events noted and care discussed with nursing staff and treatment team.  Events from last night was noted for increased shortness of breath and chest pain and decline overnight.  Patient was restarted on nitroglycerin drip which was stopped yesterday.                   Physical Exam:      Last Vital Signs:  Temp:  [95.6  F (35.3  C)-99.4  F (37.4  C)] 97.5  F (36.4  C)  Heart Rate:  [] 80  Resp:  [18-24] 18  BP: (110-247)/() 160/80  FiO2 (%):  [30 %] 30 %  SpO2:  [83 %-100 %] 94 %  I/O last 3 completed shifts:  In: 478 [P.O.:240; I.V.:238]  Out: 2575 [Urine:75; Other:2500]    Wt Readings from Last 5 Encounters:   03/08/18 87.4 kg (192 lb 9.6 oz)   02/01/18 87.8 kg (193 lb 9.6 oz)   02/01/18 90.3 kg (199 lb 1.2 oz)   01/29/18 91.3 kg (201 lb 4.8 oz)   01/19/18 85.3 kg (188 lb)       GEN:   Alert, oriented x 3, appears comfortable, NAD.  NECK:Supple ,no mass or thyromegaly   HEENT:   Normocephalic/atraumatic, no scleral icterus, no nasal discharge, mouth moist.  CV:   Regular rate and rhythm, no murmur or JVD.  S1 + S2 noted, no S3 or S4.  LUNGS: Mild increased work of breathing, generalized decreased air entry  ABD:  Active bowel sounds, soft, non-tender/non-distended.  No rebound/guarding/rigidity.  EXT: Trace edema.  No cyanosis.  No joint synovitis noted.  SKIN: Dry to touch, no exanthems noted in the visualized areas.  Neurologic:Grossly  intact,non focal                    Medications:      All current medications were reviewed with changes reflected in problem list.    Medications     nitroGLYcerin 0.07 mcg/kg/min (03/09/18 0924)     - MEDICATION INSTRUCTIONS -       - MEDICATION INSTRUCTIONS -       HEParin Stopped (03/09/18 1337)     Continuing ACE inhibitor/ARB/ARNI from home medication list OR ACE inhibitor/ARB order already placed during this visit       - MEDICATION INSTRUCTIONS -         abacavir  600 mg Oral QPM     amLODIPine (NORVASC) tablet 5 mg  5 mg Oral Daily     atorvastatin  40 mg Oral At Bedtime     clopidogrel (PLAVIX) tablet 75 mg  75 mg Oral QPM     darunavir  800 mg Oral At Bedtime     dolutegravir  50 mg Oral At Bedtime     gabapentin  300 mg Oral QAM     gabapentin  600 mg Oral QPM     hydrALAZINE (APRESOLINE) tablet 25 mg  25 mg Oral BID     insulin glargine  50 Units Subcutaneous Daily     isosorbide mononitrate  120 mg Oral QPM     magnesium oxide (MAG-OX) tablet 400 mg  400 mg Oral At Bedtime     NEPRO   Oral BID     omega-3 acid ethyl esters  2 g Oral BID     omeprazole  40 mg Oral Daily     ritonavir  100 mg Oral At Bedtime     dapsone  100 mg Oral At Bedtime     sodium chloride (PF)  3 mL Intracatheter Q8H     insulin aspart  10 Units Subcutaneous TID w/meals     insulin aspart  1-7 Units Subcutaneous TID AC     insulin aspart  1-5 Units Subcutaneous At Bedtime     sodium chloride (PF)  3 mL Intracatheter Q8H     irbesartan  150 mg Oral At Bedtime     NEPHROCAPS  1 capsule Oral Daily                Data:          Data reviewed today: I reviewed all new labs and imaging results over the last 24 hours. I personally reviewed no images or EKG's today        Recent Labs  Lab 03/09/18  0709 03/09/18  0050 03/08/18  0542   WBC 6.1 6.6 4.7   HGB 7.5* 9.2* 7.1*   HCT 25.1* 29.3* 22.9*   MCV 97 95 94    239 188     No results for input(s): CULT in the last 168 hours.    Recent Labs  Lab 03/09/18  0709 03/09/18  0023  03/08/18  0542    137 138   POTASSIUM 4.3 4.2 3.8   CHLORIDE 101 100 102   CO2 30 29 25   ANIONGAP 5 8 11   * 131* 161*   BUN 44* 36* 61*   CR 5.56* 4.83* 8.08*   GFRESTIMATED 10* 12* 7*   GFRESTBLACK 12* 15* 8*   PRABHA 8.8 8.8 9.2   PROTTOTAL 6.6*  --  7.3   ALBUMIN 3.1*  --  3.5   BILITOTAL 0.6  --  0.5   ALKPHOS 104  --  125   AST 16  --  18   ALT 15  --  16         Recent Labs  Lab 03/09/18  1157 03/09/18  0756 03/09/18  0709 03/09/18  0155 03/09/18  0023 03/09/18  0008 03/08/18  2209  03/08/18  0542   GLC  --   --  138*  --  131*  --   --   --  161*   * 130*  --  179*  --  120* 154*  < >  --    < > = values in this interval not displayed.        No results for input(s): INR in the last 168 hours.        Recent Labs  Lab 03/09/18  1016 03/09/18  0709 03/09/18  0023  03/08/18  0542   TROPONIN  --   --   --   --  0.02   TROPI 0.065* 0.074* 0.058*  < > 0.043   < > = values in this interval not displayed.    Recent Results (from the past 48 hour(s))   Chest  XR, 1 view PORTABLE    Narrative    CHEST SINGLE VIEW PORTABLE  3/8/2018 5:54 AM     HISTORY: Shortness of breath.    COMPARISON: 2/1/2018.    FINDINGS: Mildly increased interstitial opacities in both lungs.  Borderline cardiomegaly. Atherosclerotic calcification in the thoracic  aorta. A vascular stent is present in the left axillary region.      Impression    IMPRESSION: Mildly increased interstitial opacities in both lungs,  suspicious for pulmonary edema.    EN KINSEY MD   XR Chest Port 1 View    Narrative    CHEST SINGLE VIEW PORTABLE  3/9/2018 12:47 AM     HISTORY: Shortness of breath.    COMPARISON: 3/8/2018 at 0548 hours.    FINDINGS: Moderately increased interstitial opacities throughout both  lungs, increased since the comparison study. The lungs are otherwise  clear. Normal-sized cardiac silhouette.      Impression    IMPRESSION: Moderately increased interstitial opacities throughout  both lungs, increased since the comparison  study dated 3/8/2018 at  0548 hours. These findings likely relate to worsening pulmonary edema.  An infectious or inflammatory process could have a similar appearance.    EN KINSEY MD               Disclaimer: This note consists of symbols derived from keyboarding, dictation and/or voice recognition software. As a result, there may be errors in the script that have gone undetected. Please consider this when interpreting information found in this chart

## 2018-03-09 NOTE — PLAN OF CARE
"Problem: Patient Care Overview  Goal: Plan of Care/Patient Progress Review  OT/CR-Orders received and chart reviewed. Noted patient had RRT called last night due to increased CP, drop in O2 sats and EKG showed changes. Currently, patient is on heparin, nirto drip and patient is also on BiPAP. Will hold cardiac rehab eval until further MD assessment.     Addm: Per cardiology note date 3/9/18- \"Coronary angiography, possible PCI today.\" Noted patient scheduled for cath lab at 2pm today. Will reschedule cardiac rehab eval for 3/10.  "

## 2018-03-09 NOTE — DISCHARGE INSTRUCTIONS
What is the [facility] Clinic?   Cardiomyopathy  Optimization  Rehabilitation  Education  The [facility] Clinic is a heart failure specialty clinic within [facility]. It is an outpatient disease management program that is based on a phase-by-phase approach, which is tailored to each patient's individual needs. The cardiologist, nurse practitioner, physician assistant and nurses provide an ongoing outpatient care and treatment plan that guides heart failure and cardiomyopathy patients from evaluation and education to stabilization. This team works with your current primary doctor and cardiologist to help you:    *  Avoid hospitalizations   *  Slow the progression of the disease   *  Improve  length and quality of life   *  Know who and when to call if heart failure symptoms appear   *  Receive easy access to quality health care and advice   *  Better understand your condition and treatment   *  Decrease the tremendous cost burden of heart failure care   *  Detect future heart problems before they become life threatening  Follow-up Appointments   ___An appointment with the [facility] Clinic may already have been made for you (see section  Your next appointments already scheduled ). If you have a question about your appointment, would like to speak to a C.O.R.E. nurse, or would like to become a [facility] Clinic patient, please call one of the following locations:     [facility])     [phone]    [facility])    [phone]    [facility] ([place])  [phone]  ___Your C.O.R.E. Clinic Team will continue to educate you on your heart failure and may adjust medications based on your vital signs, lab work, and how you are feeling. Therefore, it is very important to bring the following to all C.O.R.E. appointments:     An accurate list of your medications    Your medicine bottles    Your weight chart/calendar

## 2018-03-09 NOTE — PROGRESS NOTES
Renal Medicine         Events of past 24 hours noted  Coronary angiogram later this afternoon    O2 sat 98 to 100% on RA  BP seems improved        Plan dialysis 03/10/18  Dialysis orders entered  UF as tolerated          Recent Labs  Lab 03/09/18  0709      POTASSIUM 4.3   CHLORIDE 101   CO2 30   ANIONGAP 5   *   BUN 44*   CR 5.56*   GFRESTIMATED 10*   GFRESTBLACK 12*   PRABHA 8.8           JAMAL Fuller    [facility]  [phone]

## 2018-03-09 NOTE — PROGRESS NOTES
RT NOTE: RRT called on pt. Pt appearing in great respiratory distress, c/o chest pain. BS coarse and with rhonchi/wz noted. Pt placed on BIPAP 10/5 with 30% FiO2. Repsiratory status improved. Pt remains on BIPAP at this time. Will continue to follow and monitor resp status.  Ramona Espino,RT  3/9/2018 1:50 AM

## 2018-03-09 NOTE — CONSULTS
Care Transition Initial Assessment - RN    Reason For Consult: care coordination/care conference, discharge planning   Met with: Patient.  DATA   Active Problems:    Acute chest pain    ESRD (end stage renal disease) on dialysis (H)    Acute respiratory failure with hypoxia (H)    Acute pulmonary edema (H)    Respiratory failure (H)       Cognitive Status: awake, alert and oriented.  Primary Care Clinic Name: [facility]  Primary Care MD Name: Dr. Aida BULL  Contact information and PCP information verified: Yes  Lives With: child(jean), adult, sibling(s), spouse  Living Arrangements: house     Description of Support System: Supportive, Involved   Who is your support system?: Wife       Insurance concerns: No Insurance issues identified  ASSESSMENT  Patient currently receives the following services:  Met with patient at bedside. Patient was admitted for CP/Resp failure w/hypoxia with BNP of  39,640. His MUSHTAQ is elevated from 8 admissions in the last 12 months. He lives in a house with wife, Jennifer, daughter and brother. He dialyzes at [facility] on [address]. [facility] fistual. He does not receive home care services outside of his dtr's PCA services and he is not on home oxygen. He still drives occasionally but his wife & daughter take him to dialysis. He follows with Dr. Cristina Baker [facility] as his PCP; his PN cardiologist is Dr. Rogers Childs; his PN nephrologist is Dr. Dominik Chavez; he recently started seeing a PN endocrinologist Dr. Marleni Mayo for his DM.        Identified issues/concerns regarding health management: He was admitted for CP & respiratory failure w/hypoxia. He had an angio on 3/9 and has required the use of BiPap. He has dialyzed while admitted on his regular schedule.     PLAN  Financial costs for the patient were not discussed.  Patient given options and choices for discharge.  Patient/family is agreeable to the plan?  Yes  Patient anticipates discharging back to  home.      Patient anticipates needs for home equipment: No, he is independent at home but uses a cane when out.      Discharge Planner   Discharge Plans in progress: Yes  Barriers to discharge plan: Pending results of angiography.  Plan/Disposition: Home   Appointments: Patient declined to have CM schedule follow up appointment. Pt follows with the 4 MDs listed above on a regular basis.     CM will continue to follow patient until discharge for any additional needs.     Ruby Hinojosa RN, BSN, CTS  [facility]  [phone]

## 2018-03-09 NOTE — PROGRESS NOTES
"X-cover    Called re: sudden onset of CP and SOB.  Pt c/o chest heaviness.  Having difficulty breathing due to SOB      BP (!) 189/103 (BP Location: Right arm)  Temp 97.5  F (36.4  C) (Oral)  Resp 24  Ht 1.803 m (5' 11\")  Wt 87.4 kg (192 lb 9.6 oz)  SpO2 90%  BMI 26.86 kg/m2   On face mask    working hard to breathe  Tachycardic with reg rhythm  Audible wet ronchi  Nt/nd.  Belly breathing  No edema    ecg done now shows new ST depressions in lateral leads.  No ST elevation    CXR pending    A/p  CP/SOB with ECG changes concerning for ACS and acute pulm edema  Severe HTN currently, ? Byproduct of above  ESRD on dialysis - dialyzed earlier today.  Says he makes some urine    Plan  Lasix 80 IV now  SL ntg now  Labetalol 40 IV now  CXR  Heparin gtt   mg now  BIPAP  ntg gtt to be resumed (was on earlier in day but turned off)  Serial troponin  Cardiology already seeing patient    > 30 min critical care time spent on this patient    "

## 2018-03-09 NOTE — PLAN OF CARE
"Problem: Patient Care Overview  Goal: Plan of Care/Patient Progress Review  Pt. Called nurse into room at 2345 stating \"just not feeling right, this came out of nowhere.\" CP felt crushing, like someone was sitting on his chest; restless activity. Pt. Resp were tachy, LS audibly coarse, w/ wheezes prev. clear. O2 quickly desatting from high 90's into the mid 80's despite O2 increase. BP increasing.  at this time. X1 nitro given with no effect and pt. increased Restlessness/anxiousness. RRT called at 0002. Nitro gtt re-initiated, IV lasix, ASA, Labetolol, x1 oral nitro, IV dilaudid all given, and heparin gtt w/ bolus started. BIPAP was placed on patient for resp distress. Urine output 75ml. See flowsheet for VS. Post RRT pt remained on nitro gtt, titrated down to 0.7 mcg/kg/min, heparin gtt at 950U/hr, tolerating BIPAP w/ sats at 100%. BP WNL. Pt. Rating CP minimally, clinically states he's feeling much better. Pt. A&Ox4, Transfers with SBA, dialysis pt. Receives it T,TH,S, fistula to left arm. Cardiology and nephrology following.       "

## 2018-03-10 LAB
ALBUMIN SERPL-MCNC: 3 G/DL (ref 3.4–5)
ALP SERPL-CCNC: 89 U/L (ref 40–150)
ALT SERPL W P-5'-P-CCNC: 17 U/L (ref 0–70)
ANION GAP SERPL CALCULATED.3IONS-SCNC: 9 MMOL/L (ref 3–14)
AST SERPL W P-5'-P-CCNC: 17 U/L (ref 0–45)
BASOPHILS # BLD AUTO: 0 10E9/L (ref 0–0.2)
BASOPHILS NFR BLD AUTO: 0.7 %
BILIRUB SERPL-MCNC: 0.4 MG/DL (ref 0.2–1.3)
BLD PROD TYP BPU: NORMAL
BLD UNIT ID BPU: 0
BLOOD PRODUCT CODE: NORMAL
BPU ID: NORMAL
BUN SERPL-MCNC: 57 MG/DL (ref 7–30)
CALCIUM SERPL-MCNC: 8.8 MG/DL (ref 8.5–10.1)
CHLORIDE SERPL-SCNC: 104 MMOL/L (ref 94–109)
CO2 SERPL-SCNC: 26 MMOL/L (ref 20–32)
CREAT SERPL-MCNC: 7.55 MG/DL (ref 0.66–1.25)
DIFFERENTIAL METHOD BLD: ABNORMAL
EOSINOPHIL # BLD AUTO: 0.1 10E9/L (ref 0–0.7)
EOSINOPHIL NFR BLD AUTO: 2.4 %
ERYTHROCYTE [DISTWIDTH] IN BLOOD BY AUTOMATED COUNT: 15.9 % (ref 10–15)
GFR SERPL CREATININE-BSD FRML MDRD: 7 ML/MIN/1.7M2
GLUCOSE BLDC GLUCOMTR-MCNC: 141 MG/DL (ref 70–99)
GLUCOSE BLDC GLUCOMTR-MCNC: 142 MG/DL (ref 70–99)
GLUCOSE BLDC GLUCOMTR-MCNC: 178 MG/DL (ref 70–99)
GLUCOSE BLDC GLUCOMTR-MCNC: 186 MG/DL (ref 70–99)
GLUCOSE BLDC GLUCOMTR-MCNC: 190 MG/DL (ref 70–99)
GLUCOSE BLDC GLUCOMTR-MCNC: 210 MG/DL (ref 70–99)
GLUCOSE SERPL-MCNC: 133 MG/DL (ref 70–99)
HCT VFR BLD AUTO: 23.3 % (ref 40–53)
HGB BLD-MCNC: 7.1 G/DL (ref 13.3–17.7)
IMM GRANULOCYTES # BLD: 0.1 10E9/L (ref 0–0.4)
IMM GRANULOCYTES NFR BLD: 1.2 %
LMWH PPP CHRO-ACNC: 0.12 IU/ML
LMWH PPP CHRO-ACNC: 0.23 IU/ML
LYMPHOCYTES # BLD AUTO: 1 10E9/L (ref 0.8–5.3)
LYMPHOCYTES NFR BLD AUTO: 17 %
MCH RBC QN AUTO: 28.7 PG (ref 26.5–33)
MCHC RBC AUTO-ENTMCNC: 30.5 G/DL (ref 31.5–36.5)
MCV RBC AUTO: 94 FL (ref 78–100)
MONOCYTES # BLD AUTO: 0.5 10E9/L (ref 0–1.3)
MONOCYTES NFR BLD AUTO: 8.3 %
NEUTROPHILS # BLD AUTO: 4.1 10E9/L (ref 1.6–8.3)
NEUTROPHILS NFR BLD AUTO: 70.4 %
NRBC # BLD AUTO: 0 10*3/UL
NRBC BLD AUTO-RTO: 0 /100
PLATELET # BLD AUTO: 160 10E9/L (ref 150–450)
POTASSIUM SERPL-SCNC: 4.9 MMOL/L (ref 3.4–5.3)
PROT SERPL-MCNC: 6.7 G/DL (ref 6.8–8.8)
RBC # BLD AUTO: 2.47 10E12/L (ref 4.4–5.9)
SODIUM SERPL-SCNC: 139 MMOL/L (ref 133–144)
TRANSFUSION STATUS PATIENT QL: NORMAL
TRANSFUSION STATUS PATIENT QL: NORMAL
WBC # BLD AUTO: 5.8 10E9/L (ref 4–11)

## 2018-03-10 PROCEDURE — 25000128 H RX IP 250 OP 636: Performed by: INTERNAL MEDICINE

## 2018-03-10 PROCEDURE — 99233 SBSQ HOSP IP/OBS HIGH 50: CPT | Performed by: INTERNAL MEDICINE

## 2018-03-10 PROCEDURE — 25000132 ZZH RX MED GY IP 250 OP 250 PS 637: Mod: GY | Performed by: INTERNAL MEDICINE

## 2018-03-10 PROCEDURE — 82668 ASSAY OF ERYTHROPOIETIN: CPT | Performed by: INTERNAL MEDICINE

## 2018-03-10 PROCEDURE — 80053 COMPREHEN METABOLIC PANEL: CPT | Performed by: INTERNAL MEDICINE

## 2018-03-10 PROCEDURE — 25000132 ZZH RX MED GY IP 250 OP 250 PS 637: Mod: GY | Performed by: PHYSICIAN ASSISTANT

## 2018-03-10 PROCEDURE — 25000131 ZZH RX MED GY IP 250 OP 636 PS 637: Mod: GY | Performed by: PHYSICIAN ASSISTANT

## 2018-03-10 PROCEDURE — 12000047 ZZH R&B IMCU

## 2018-03-10 PROCEDURE — 36415 COLL VENOUS BLD VENIPUNCTURE: CPT | Performed by: INTERNAL MEDICINE

## 2018-03-10 PROCEDURE — A9270 NON-COVERED ITEM OR SERVICE: HCPCS | Mod: GY | Performed by: INTERNAL MEDICINE

## 2018-03-10 PROCEDURE — 85025 COMPLETE CBC W/AUTO DIFF WBC: CPT | Performed by: INTERNAL MEDICINE

## 2018-03-10 PROCEDURE — 85520 HEPARIN ASSAY: CPT | Performed by: INTERNAL MEDICINE

## 2018-03-10 PROCEDURE — P9016 RBC LEUKOCYTES REDUCED: HCPCS | Performed by: INTERNAL MEDICINE

## 2018-03-10 PROCEDURE — 63400005 ZZH RX 634: Performed by: INTERNAL MEDICINE

## 2018-03-10 PROCEDURE — 90937 HEMODIALYSIS REPEATED EVAL: CPT

## 2018-03-10 PROCEDURE — A9270 NON-COVERED ITEM OR SERVICE: HCPCS | Mod: GY | Performed by: PHYSICIAN ASSISTANT

## 2018-03-10 PROCEDURE — 00000146 ZZHCL STATISTIC GLUCOSE BY METER IP

## 2018-03-10 RX ORDER — DOXERCALCIFEROL 4 UG/2ML
4 INJECTION INTRAVENOUS
Status: COMPLETED | OUTPATIENT
Start: 2018-03-10 | End: 2018-03-10

## 2018-03-10 RX ORDER — LABETALOL HYDROCHLORIDE 5 MG/ML
10 INJECTION, SOLUTION INTRAVENOUS
Status: DISCONTINUED | OUTPATIENT
Start: 2018-03-10 | End: 2018-03-16 | Stop reason: HOSPADM

## 2018-03-10 RX ORDER — HYDRALAZINE HYDROCHLORIDE 25 MG/1
25 TABLET, FILM COATED ORAL EVERY 8 HOURS SCHEDULED
Status: DISCONTINUED | OUTPATIENT
Start: 2018-03-10 | End: 2018-03-16 | Stop reason: HOSPADM

## 2018-03-10 RX ADMIN — HYDRALAZINE HYDROCHLORIDE 25 MG: 25 TABLET ORAL at 08:16

## 2018-03-10 RX ADMIN — OMEGA-3-ACID ETHYL ESTERS 2 G: 1 CAPSULE, LIQUID FILLED ORAL at 08:16

## 2018-03-10 RX ADMIN — HEPARIN SODIUM 1450 UNITS/HR: 10000 INJECTION, SOLUTION INTRAVENOUS at 11:17

## 2018-03-10 RX ADMIN — ATORVASTATIN CALCIUM 40 MG: 40 TABLET, FILM COATED ORAL at 21:10

## 2018-03-10 RX ADMIN — Medication: at 20:12

## 2018-03-10 RX ADMIN — INSULIN GLARGINE 50 UNITS: 100 INJECTION, SOLUTION SUBCUTANEOUS at 12:20

## 2018-03-10 RX ADMIN — INSULIN ASPART 10 UNITS: 100 INJECTION, SOLUTION INTRAVENOUS; SUBCUTANEOUS at 18:01

## 2018-03-10 RX ADMIN — METOPROLOL TARTRATE 12.5 MG: 25 TABLET, FILM COATED ORAL at 17:59

## 2018-03-10 RX ADMIN — SODIUM CHLORIDE 300 ML: 9 INJECTION, SOLUTION INTRAVENOUS at 15:05

## 2018-03-10 RX ADMIN — INSULIN ASPART 10 UNITS: 100 INJECTION, SOLUTION INTRAVENOUS; SUBCUTANEOUS at 08:21

## 2018-03-10 RX ADMIN — SODIUM CHLORIDE 250 ML: 9 INJECTION, SOLUTION INTRAVENOUS at 15:05

## 2018-03-10 RX ADMIN — NITROGLYCERIN 0.1 MCG/KG/MIN: 20 INJECTION INTRAVENOUS at 03:16

## 2018-03-10 RX ADMIN — Medication 1 CAPSULE: at 08:16

## 2018-03-10 RX ADMIN — IRBESARTAN 150 MG: 75 TABLET ORAL at 21:10

## 2018-03-10 RX ADMIN — OMEGA-3-ACID ETHYL ESTERS 2 G: 1 CAPSULE, LIQUID FILLED ORAL at 20:12

## 2018-03-10 RX ADMIN — AMLODIPINE BESYLATE 5 MG: 5 TABLET ORAL at 08:16

## 2018-03-10 RX ADMIN — Medication 2450 UNITS: at 09:03

## 2018-03-10 RX ADMIN — ACETAMINOPHEN 650 MG: 325 TABLET, FILM COATED ORAL at 08:19

## 2018-03-10 RX ADMIN — ABACAVIR 600 MG: 300 TABLET, FILM COATED ORAL at 20:12

## 2018-03-10 RX ADMIN — Medication: at 08:30

## 2018-03-10 RX ADMIN — Medication 10 MG: at 17:58

## 2018-03-10 RX ADMIN — ISOSORBIDE MONONITRATE 120 MG: 60 TABLET, EXTENDED RELEASE ORAL at 20:12

## 2018-03-10 RX ADMIN — GABAPENTIN 300 MG: 300 CAPSULE ORAL at 08:16

## 2018-03-10 RX ADMIN — ACETAMINOPHEN 650 MG: 325 TABLET, FILM COATED ORAL at 22:24

## 2018-03-10 RX ADMIN — INSULIN ASPART 10 UNITS: 100 INJECTION, SOLUTION INTRAVENOUS; SUBCUTANEOUS at 12:18

## 2018-03-10 RX ADMIN — NITROGLYCERIN 1.97 MCG/KG/MIN: 20 INJECTION INTRAVENOUS at 20:11

## 2018-03-10 RX ADMIN — NITROGLYCERIN 0.2 MCG/KG/MIN: 20 INJECTION INTRAVENOUS at 14:42

## 2018-03-10 RX ADMIN — HYDRALAZINE HYDROCHLORIDE 25 MG: 25 TABLET ORAL at 21:11

## 2018-03-10 RX ADMIN — DAPSONE 100 MG: 100 TABLET ORAL at 21:10

## 2018-03-10 RX ADMIN — MAGNESIUM OXIDE TAB 400 MG (241.3 MG ELEMENTAL MG) 400 MG: 400 (241.3 MG) TAB at 21:11

## 2018-03-10 RX ADMIN — DARUNAVIR 800 MG: 800 TABLET, FILM COATED ORAL at 21:11

## 2018-03-10 RX ADMIN — GABAPENTIN 600 MG: 300 CAPSULE ORAL at 20:12

## 2018-03-10 RX ADMIN — RITONAVIR 100 MG: 100 TABLET, FILM COATED ORAL at 21:11

## 2018-03-10 RX ADMIN — Medication 10 MG: at 11:14

## 2018-03-10 RX ADMIN — ERYTHROPOIETIN 12000 UNITS: 10000 INJECTION, SOLUTION INTRAVENOUS; SUBCUTANEOUS at 16:00

## 2018-03-10 RX ADMIN — CLOPIDOGREL 75 MG: 75 TABLET, FILM COATED ORAL at 20:12

## 2018-03-10 RX ADMIN — DOXERCALCIFEROL 4 MCG: 4 INJECTION, SOLUTION INTRAVENOUS at 15:59

## 2018-03-10 RX ADMIN — NITROGLYCERIN 1.97 MCG/KG/MIN: 20 INJECTION INTRAVENOUS at 09:59

## 2018-03-10 RX ADMIN — OMEPRAZOLE 40 MG: 20 CAPSULE, DELAYED RELEASE ORAL at 17:59

## 2018-03-10 RX ADMIN — HYDRALAZINE HYDROCHLORIDE 25 MG: 25 TABLET ORAL at 18:00

## 2018-03-10 ASSESSMENT — ACTIVITIES OF DAILY LIVING (ADL)
ADLS_ACUITY_SCORE: 11

## 2018-03-10 ASSESSMENT — PAIN DESCRIPTION - DESCRIPTORS: DESCRIPTORS: SHARP

## 2018-03-10 NOTE — PLAN OF CARE
Problem: Patient Care Overview  Goal: Plan of Care/Patient Progress Review  A&O. Up with SBA and cane. Lung sounds: clear. Weaned from Bipap to room air, carmen well. Tele: SR. Diet: 2g Na, no caff. PIV: Nitro gtt and hep gtt infusing. Pain: denies CP. Angio cancelled today d/t computer issues, cards will cont to follow. Cont POC.

## 2018-03-10 NOTE — PROGRESS NOTES
"[facility]    Cardiology Progress Note    Donald Raza is a 68 year old male who was admitted on 3/8/2018.      Assessment & Plan     A: 1)Relatively stable cardiac condition- no further angina at present         2)Renal failure with ESRD on dialysis      3)HBP-incomplete control      4)Anemia    P:  1)Telemetry       2)Continue IV heparin       3)Consider pRBC transfusion to HGB>8(closer to 9, if possible)       4)Dialysis       5)Reschedule cath for 3/12       6)Adjust hydralazine/amlodipine/nitrate as tolerated and consider cautious low dose metoprolol  Time Spent: > than 35 minutes of complex medical care for multiple problems.    Gerson Yan MD [facility]    Interval History   S:   Tired with mild SOB; no significant chest discomfort; somewhat discouraged about postponement of cath due to technical difficulties.    Physical Exam   Vitals: Blood pressure 178/81, temperature 96.1  F (35.6  C), temperature source Oral, resp. rate 16, height 1.803 m (5' 11\"), weight 91.9 kg (202 lb 9.6 oz), SpO2 94 %., Heart Rate: 82, Wt Readings from Last 4 Encounters:   03/10/18 91.9 kg (202 lb 9.6 oz)   02/01/18 87.8 kg (193 lb 9.6 oz)   02/01/18 90.3 kg (199 lb 1.2 oz)   01/29/18 91.3 kg (201 lb 4.8 oz)       I/O last 3 completed shifts:  In: 500 [P.O.:500]  Out: -     Lungs:  Decreased BS at bases with crackles and scattered rhonchi  Heart: RR; S4, 1-2/6 systolic murmur  Extremities: 1-2+ edema      Medications     HEParin 1,450 Units/hr (03/10/18 0904)     nitroGLYcerin 1.97 mcg/kg/min (03/10/18 0959)     - MEDICATION INSTRUCTIONS -       - MEDICATION INSTRUCTIONS -       sodium chloride 30 mL/hr at 03/09/18 2312     Continuing ACE inhibitor/ARB/ARNI from home medication list OR ACE inhibitor/ARB order already placed during this visit       - MEDICATION INSTRUCTIONS -         sodium chloride (PF)  3 mL Intracatheter Q8H     abacavir  600 mg Oral QPM     amLODIPine (NORVASC) tablet 5 mg  5 mg Oral Daily     " atorvastatin  40 mg Oral At Bedtime     clopidogrel (PLAVIX) tablet 75 mg  75 mg Oral QPM     darunavir  800 mg Oral At Bedtime     dolutegravir  50 mg Oral At Bedtime     gabapentin  300 mg Oral QAM     gabapentin  600 mg Oral QPM     hydrALAZINE (APRESOLINE) tablet 25 mg  25 mg Oral BID     insulin glargine  50 Units Subcutaneous Daily     isosorbide mononitrate  120 mg Oral QPM     magnesium oxide (MAG-OX) tablet 400 mg  400 mg Oral At Bedtime     NEPRO   Oral BID     omega-3 acid ethyl esters  2 g Oral BID     omeprazole  40 mg Oral Daily     ritonavir  100 mg Oral At Bedtime     dapsone  100 mg Oral At Bedtime     insulin aspart  10 Units Subcutaneous TID w/meals     insulin aspart  1-7 Units Subcutaneous TID AC     insulin aspart  1-5 Units Subcutaneous At Bedtime     irbesartan  150 mg Oral At Bedtime     NEPHROCAPS  1 capsule Oral Daily          All laboratory data reviewed  ROUTINE IP LABS (Last four results)  BMP  Recent Labs  Lab 03/10/18  0730 03/09/18  0709 03/09/18  0023 03/08/18  0542    136 137 138   POTASSIUM 4.9 4.3 4.2 3.8   CHLORIDE 104 101 100 102   PRABHA 8.8 8.8 8.8 9.2   CO2 26 30 29 25   BUN 57* 44* 36* 61*   CR 7.55* 5.56* 4.83* 8.08*   * 138* 131* 161*     CBC  Recent Labs  Lab 03/10/18  0730 03/09/18  0709 03/09/18  0050 03/08/18  0542   WBC 5.8 6.1 6.6 4.7   RBC 2.47* 2.59* 3.10* 2.43*   HGB 7.1* 7.5* 9.2* 7.1*   HCT 23.3* 25.1* 29.3* 22.9*   MCV 94 97 95 94   MCH 28.7 29.0 29.7 29.2   MCHC 30.5* 29.9* 31.4* 31.0*   RDW 15.9* 15.7* 15.6* 15.7*    174 239 188     INRNo lab results found in last 7 days. Lab Results   Component Value Date    TROPI 0.065 (H) 03/09/2018    TROPI 0.074 (H) 03/09/2018    TROPI 0.058 (H) 03/09/2018    TROPI 0.052 (H) 03/08/2018    TROPI 0.045 03/08/2018    TROPONIN 0.02 03/08/2018    TROPONIN 0.02 12/25/2017    TROPONIN 0.02 04/15/2017    TROPONIN 0.02 05/19/2016         EKG results:  Reviewed if available.   Performed on 3/10/2018         Results:  Sinus rhythm

## 2018-03-10 NOTE — PROGRESS NOTES
[facility]  Hospitalist Progress Note  Glenna Fernández MD 03/10/2018    Reason for Stay (Diagnosis): ACS with elevated troponin         Assessment and Plan:      Summary of Stay: Donald Raza is a 68 year old male with a history of CAD with prior stenting to the LAD, preserved EF  At 55-60 % by echo in 12/2017, ESRD on HD, T2dm, HIV on therapy, htn/hlp admitted on 3/8/2018 with CP and SOB with e/o hypoxic respiratory failure with sats 83 % requiring short term BiPAP rescue therapy, and persistent CP necessitating NTG gtt. He had mildly elevated trops.    Cardiology is on board and assisting with management, of note most recent angio completed 10/2017 showing no lesions that would require intervention and plan was for medical management.      His brief hospitalization is notable for difficult to control hypertension  Problem List:   1. ACS/NSTEMI:  With mildly elevated troponin in setting of ESRD-peaked at 0.074 .  Per initial cardiology consult-suspect CP and mildly elevated trop due to uncontrolled hypertension and chronic anemia please see change in meds as outlined in #3-and recs for hgb > 8.0.  Remains on heparin and ntg gtt and in discussion with Dr. Santillan today - angio on Monday 3/12-apparently computer mishap yesterday made it impossible to complete  2. Hypoxic respiratory failure-due to diastolic HF with exacerbation in the setting of uncontrolled hypertension.  Per renal no e/o significant volume overload as at baseline dry weight 87 kg on presentation.  Much improved and now only on 2L/nc.  Continue to wean as able  3. Htn:  Uncontrolled with BPs ranging from 150-210's.  pta medications were  mg, amlodipine 5 mg, hydralazine 25 mg bid, losartan 50 mg-currently on NTG gtt, amlodipine 5 mg, hydralazine 25 mg bid, and losartan switched to irbesartan 150 mg.  BPs still uncontrolled, have added in prn labetalol, not clear why he's not on a BB at baseline, discussed with cards-plan for  initiation of low dose metop bid (apparently ? If had troubles with bradycardia?) and hydralazine increased to 25 mg tid  4. Anemia; chronic-baseline hgb seems to be 8-9 range, but has had variable hgbs over the past year sometimes dipping into the 5 range.  Had screening EGD and colonoscopy in 2/17 and 5/17 respectively negative for bleeding. Denies any bleeding.  Last epo that I can find 16 in 2011 (normal 4-27)  Per cards should try to keep > 8.0-no active bleeding identified, ? Just due to HD and destruction vs BM suppression from medications.  Is on IV iron with HDrec'd  I U on admission for hgb 7.1- with good response to 9.2, but now 7.1 again within 48 hours.  Will transfuse additional unit while on HD this afternoon  5. T2dm:  Home regimen is glargine 50 U sq qd, lispro 15 u tid with meals, as well as additional lispro per SS-currently back on glargine at 50 u and lispro 10 u tid with ISS and fair control.   6. HIV:  Cont home regimen of antivirals  7. Underlying CAD:  Cont clopidogrel/statin, and attempt better BP control as outlined above  8. HLP:  Cont with atorva 40 mg  9. Anxiety:  Cont with prn clonazepam as at home  10. ESRD:  Appreciate nephrology input-ESRD typically Tuesday/thursday/saturday-plan for HD today   DVT Prophylaxis: currently on heparin gtt  Code Status: Full Code  # Pain Assessment:   Current Pain Score 3/10/2018 3/10/2018 3/9/2018   Patient currently in pain? yes denies yes   Pain score (0-10) 5 0 5   Pain location Ankle - Ankle   Pain descriptors Sharp - Aching;Discomfort   CPOT pain score - - -   - Donald is experiencing pain due to ankle pain. Pain management was discussed and the plan was created in a collaborative fashion.  Donald's response to the current recommendations: engaged  - Pharmacologic adjuvants: Acetaminophen        Discharge Dispo: home  Estimated Disch Date / # of Days until Disch: ? 2-3 days, depends on findings from angio         Interval History (Subjective):     "  Denies any bleeding tendencies, no black tarry stools, no bruising, no epistaxis.  Reports egd and colonoscopy last year to try and identify source and eval negative-I was able to review those results in care everywhere.  He is currently denying any cp to me, no real sob.  No n/v/d, reports po intake is good                   Physical Exam:      Last Vital Signs:  BP (!) 217/119 (BP Location: Left leg)  Temp 96.1  F (35.6  C) (Oral)  Resp 16  Ht 1.803 m (5' 11\")  Wt 91.9 kg (202 lb 9.6 oz)  SpO2 97%  BMI 28.26 kg/m2    I/O:  Net - 900 cc  Weight up 4.3 kg  Tele:  NSR    Pleasant nad looks stated age, appears fatigued.  Head nc/at sclera normal pigmentation. RRR no 2/6 lizzette no le edema no r/g abd s/nt/nd alert and oriented affect appropriate naidu            Medications:      All current medications were reviewed with changes reflected in problem list.         Data:      All new lab and imaging data was reviewed.   Labs:    Recent Labs  Lab 03/10/18  0730      POTASSIUM 4.9   CHLORIDE 104   CO2 26   ANIONGAP 9   *   BUN 57*   CR 7.55*   GFRESTIMATED 7*   GFRESTBLACK 9*   PRABHA 8.8       Recent Labs  Lab 03/10/18  0730   WBC 5.8   HGB 7.1*   HCT 23.3*   MCV 94         Imaging:       "

## 2018-03-10 NOTE — PLAN OF CARE
Problem: Patient Care Overview  Goal: Plan of Care/Patient Progress Review  Outcome: No Change  Orientation: Alert and oriented x4. Awake, alert, cooperative, no apparent distress.     VSS. Pt was initially at 88% on RA, 94% on  2 LPM NC. Afebrile.  IVF: NS infusing 30 ml/hr, Hep gtt infusing 13.0 ml/hr after 4000 unit bolus this shift, Nitro gtt infusing at 1.97 mcg/kg/min BPs stable in the 160's systolic, see provider notification note.  Tele: SR. HR 80s. Regular rate and rhythm, no murmur noted  LS: clear and equal bilaterally. Clear to auscultation bilaterally.  No wheezing  GI: Passing gas. No BM. Denies N/V.  Normal bowel sounds, soft, non-distended, non-tender  : No urine output this shift, pt on hemodialysis   Skin: bruising noted, Skin otherwise intact. No rashes, no cyanosis, dry to touch  Activity: SBA to assist of 1. Pt slept comfortably throughout shift.   Pain: C/o ankle discomfort. Elevated foot and applied ice pack, No PRN interventions utilized. Pt sleeping.   DC Plan: Continue with current cares. Dialysis today, possible angio to be determined.

## 2018-03-10 NOTE — PLAN OF CARE
Problem: Patient Care Overview  Goal: Plan of Care/Patient Progress Review  Cardiac Rehab:  eval order received and chart reviewed.  Pt admitted with chest pain, SOB and found to have acute coronary syndrome requiring nitro drip.  PMH:  CAD, ESRD on dialysis, DM, HTN, HLP and HIV.  Per RN, Pt not appropriate for eval and awaiting coronary angiography, possible PCI.  Plan to reschedule and continue as appropriate.

## 2018-03-10 NOTE — PROGRESS NOTES
[facility]     Renal Progress Note       SHORTHAND KEY FOR MY NOTES:  c = with, s = without, p = after, a = before, x = except, asx = asymptomatic, tx = transplant or treatment, sx = symptoms or symptomatic, cx = canceled or culture, rxn = reaction, yday = yesterday, nl = normal, abx = antibiotics, fxn = function, dx = diagnosis, dz = disease, m/h = melena/hematochezia, c/d/l/ha = cramping/dizziness/lightheadedness/headache, d/c = discharge or diarrhea/constipation, f/c/n/v = fevers/chills/nausea/vomiting, cp/sob = chest pain/shortness of breath.         Assessment/Plan:     1.  ESKD.  Pt is on a TRS schedule and is stable.    A.  Continue TRS schedule.  Next run on Tues.    2.  Chest pain.  Pt is on a NTG gtt.  Also, on heparin.  Due for a heart cath on Monday.  A.  Per Cards.    3.  Anemia.  He rec'd 1 unit of blood today.  A.  Agree c transfusion.  B.  Follow hb, clinically.        Interval History:     Pt is feeling ok right now.  No cp - on NTG gtt.  He is tolerating HD s probs.          Medications and Allergies:       - MEDICATION INSTRUCTIONS -   Does not apply Once     - MEDICATION INSTRUCTIONS for Dialysis Patients -   Does not apply See Admin Instructions     hydrALAZINE (APRESOLINE) tablet 25 mg  25 mg Oral Q8H JAN     metoprolol tartrate  12.5 mg Oral Q8H     sodium chloride (PF)  3 mL Intracatheter Q8H     abacavir  600 mg Oral QPM     amLODIPine (NORVASC) tablet 5 mg  5 mg Oral Daily     atorvastatin  40 mg Oral At Bedtime     clopidogrel (PLAVIX) tablet 75 mg  75 mg Oral QPM     darunavir  800 mg Oral At Bedtime     dolutegravir  50 mg Oral At Bedtime     gabapentin  300 mg Oral QAM     gabapentin  600 mg Oral QPM     insulin glargine  50 Units Subcutaneous Daily     isosorbide mononitrate  120 mg Oral QPM     magnesium oxide (MAG-OX) tablet 400 mg  400 mg Oral At Bedtime     NEPRO   Oral BID     omega-3 acid ethyl esters  2 g Oral BID     omeprazole  40 mg Oral Daily     ritonavir  100  "mg Oral At Bedtime     dapsone  100 mg Oral At Bedtime     insulin aspart  10 Units Subcutaneous TID w/meals     insulin aspart  1-7 Units Subcutaneous TID AC     insulin aspart  1-5 Units Subcutaneous At Bedtime     irbesartan  150 mg Oral At Bedtime     NEPHROCAPS  1 capsule Oral Daily        Allergies   Allergen Reactions     Calcium Acetate Other (See Comments)     Other reaction(s): Other, see comments  Pain in chest and back  Pain in chest area (sensitive skin)      Diagnostic X-Ray Materials Other (See Comments)     PN: renal failure     Lisinopril      Sulfa Drugs           Physical Exam:     Vitals were reviewed    Heart Rate: 77, Blood pressure 160/83, temperature 98.1  F (36.7  C), resp. rate 18, height 1.803 m (5' 11\"), weight 91.9 kg (202 lb 9.6 oz), SpO2 100 %.  Wt Readings from Last 3 Encounters:   03/10/18 91.9 kg (202 lb 9.6 oz)   02/01/18 87.8 kg (193 lb 9.6 oz)   02/01/18 90.3 kg (199 lb 1.2 oz)     Intake/Output Summary (Last 24 hours) at 03/10/18 1651  Last data filed at 03/10/18 1530   Gross per 24 hour   Intake             1280 ml   Output                0 ml   Net             1280 ml     GENERAL APPEARANCE: pleasant, NAD, alert  HEENT:  Eyes/ears/nose/neck grossly normal  RESP: lungs cta b c good efforts, no crackles, rhonchi or wheezes  CV: RRR c 2/6 m, nl S1/S2  ABDOMEN: o/s/nt/nd  EXTREMITIES/SKIN: tr ble edema    Pt seen on HD.  Stable run x BP remains in the 170s.  UF goal increased to -3.5L.  Good BFR via LAF.         Data:     CBC RESULTS:     Recent Labs  Lab 03/10/18  0730 03/09/18  0709 03/09/18  0050 03/08/18  0542   WBC 5.8 6.1 6.6 4.7   RBC 2.47* 2.59* 3.10* 2.43*   HGB 7.1* 7.5* 9.2* 7.1*   HCT 23.3* 25.1* 29.3* 22.9*    174 239 188     Basic Metabolic Panel:    Recent Labs  Lab 03/10/18  0730 03/09/18  0709 03/09/18  0023 03/08/18  0542    136 137 138   POTASSIUM 4.9 4.3 4.2 3.8   CHLORIDE 104 101 100 102   CO2 26 30 29 25   BUN 57* 44* 36* 61*   CR 7.55* 5.56* " 4.83* 8.08*   * 138* 131* 161*   PRABHA 8.8 8.8 8.8 9.2     INRNo lab results found in last 7 days.   Attestation:   I have reviewed today's relevant vital signs, notes, medications, labs and imaging.    Bipin Chaves MD  [facility] Nephrology  [phone]

## 2018-03-10 NOTE — PROGRESS NOTES
Potassium   Date Value Ref Range Status   03/10/2018 4.9 3.4 - 5.3 mmol/L Final   ]  Lab Results   Component Value Date    HGB 7.1 03/10/2018     Weight: 91.9 kg (202 lb 9.6 oz)    DIALYSIS PROCEDURE NOTE    Patient dialyzed for 3.5  hrs on a 2 K bath with a  net fluid removal of 3 L.  A BFR of 400 ml/min was obtained via left upper AVF and all connections secured.   Patient was seen by Dr. Chaves during treatment.  No heparin administered during tx. Pt received 1 unit of PRBCs during tx.  Meds given:Epogen, Hectoral. Complications:None.   Procedure and ESRD teaching done and questions answered.  See flowsheet in EPIC for further details.    Outpatient Dialysis at  [facility]    Kylee Buckley RN

## 2018-03-10 NOTE — PROVIDER NOTIFICATION
MD Paged - Pts Nitro gtt is maxed out at 2.0, BP's remain stable at 153/69,161/70, 167/76  Please advise    0612 Pts blood pressures remain in a acceptable level per Dr Worthy, Keep Nitro gtt at current setting 1.97 mcg/kg/min, continue to monitor

## 2018-03-11 LAB
ABO + RH BLD: NORMAL
ABO + RH BLD: NORMAL
ANION GAP SERPL CALCULATED.3IONS-SCNC: 9 MMOL/L (ref 3–14)
BLD GP AB SCN SERPL QL: NORMAL
BLD PROD TYP BPU: NORMAL
BLD UNIT ID BPU: 0
BLD UNIT ID BPU: 0
BLOOD BANK CMNT PATIENT-IMP: NORMAL
BLOOD PRODUCT CODE: NORMAL
BLOOD PRODUCT CODE: NORMAL
BPU ID: NORMAL
BPU ID: NORMAL
BUN SERPL-MCNC: 37 MG/DL (ref 7–30)
CALCIUM SERPL-MCNC: 8.9 MG/DL (ref 8.5–10.1)
CHLORIDE SERPL-SCNC: 99 MMOL/L (ref 94–109)
CO2 SERPL-SCNC: 27 MMOL/L (ref 20–32)
CREAT SERPL-MCNC: 5.14 MG/DL (ref 0.66–1.25)
GFR SERPL CREATININE-BSD FRML MDRD: 11 ML/MIN/1.7M2
GLUCOSE BLDC GLUCOMTR-MCNC: 133 MG/DL (ref 70–99)
GLUCOSE BLDC GLUCOMTR-MCNC: 145 MG/DL (ref 70–99)
GLUCOSE BLDC GLUCOMTR-MCNC: 159 MG/DL (ref 70–99)
GLUCOSE BLDC GLUCOMTR-MCNC: 164 MG/DL (ref 70–99)
GLUCOSE BLDC GLUCOMTR-MCNC: 68 MG/DL (ref 70–99)
GLUCOSE SERPL-MCNC: 134 MG/DL (ref 70–99)
HGB BLD-MCNC: 7.7 G/DL (ref 13.3–17.7)
LMWH PPP CHRO-ACNC: 0.17 IU/ML
LMWH PPP CHRO-ACNC: 0.2 IU/ML
LMWH PPP CHRO-ACNC: <0.1 IU/ML
NUM BPU REQUESTED: 4
POTASSIUM SERPL-SCNC: 4 MMOL/L (ref 3.4–5.3)
SODIUM SERPL-SCNC: 135 MMOL/L (ref 133–144)
SPECIMEN EXP DATE BLD: NORMAL
TRANSFUSION STATUS PATIENT QL: NORMAL

## 2018-03-11 PROCEDURE — 25000132 ZZH RX MED GY IP 250 OP 250 PS 637: Mod: GY | Performed by: INTERNAL MEDICINE

## 2018-03-11 PROCEDURE — P9016 RBC LEUKOCYTES REDUCED: HCPCS | Performed by: INTERNAL MEDICINE

## 2018-03-11 PROCEDURE — 25000128 H RX IP 250 OP 636: Performed by: INTERNAL MEDICINE

## 2018-03-11 PROCEDURE — A9270 NON-COVERED ITEM OR SERVICE: HCPCS | Mod: GY | Performed by: PHYSICIAN ASSISTANT

## 2018-03-11 PROCEDURE — 85018 HEMOGLOBIN: CPT | Performed by: INTERNAL MEDICINE

## 2018-03-11 PROCEDURE — A9270 NON-COVERED ITEM OR SERVICE: HCPCS | Mod: GY | Performed by: INTERNAL MEDICINE

## 2018-03-11 PROCEDURE — 25000131 ZZH RX MED GY IP 250 OP 636 PS 637: Mod: GY | Performed by: PHYSICIAN ASSISTANT

## 2018-03-11 PROCEDURE — 85520 HEPARIN ASSAY: CPT | Performed by: INTERNAL MEDICINE

## 2018-03-11 PROCEDURE — 80048 BASIC METABOLIC PNL TOTAL CA: CPT | Performed by: INTERNAL MEDICINE

## 2018-03-11 PROCEDURE — 99233 SBSQ HOSP IP/OBS HIGH 50: CPT | Performed by: INTERNAL MEDICINE

## 2018-03-11 PROCEDURE — 90937 HEMODIALYSIS REPEATED EVAL: CPT

## 2018-03-11 PROCEDURE — 36415 COLL VENOUS BLD VENIPUNCTURE: CPT | Performed by: INTERNAL MEDICINE

## 2018-03-11 PROCEDURE — 12000047 ZZH R&B IMCU

## 2018-03-11 PROCEDURE — 00000146 ZZHCL STATISTIC GLUCOSE BY METER IP

## 2018-03-11 PROCEDURE — 25000132 ZZH RX MED GY IP 250 OP 250 PS 637: Mod: GY | Performed by: PHYSICIAN ASSISTANT

## 2018-03-11 RX ORDER — IRBESARTAN 75 MG/1
150 TABLET ORAL ONCE
Status: COMPLETED | OUTPATIENT
Start: 2018-03-11 | End: 2018-03-11

## 2018-03-11 RX ORDER — ALBUMIN (HUMAN) 12.5 G/50ML
50 SOLUTION INTRAVENOUS
Status: DISCONTINUED | OUTPATIENT
Start: 2018-03-11 | End: 2018-03-11

## 2018-03-11 RX ORDER — IRBESARTAN 75 MG/1
300 TABLET ORAL AT BEDTIME
Status: DISCONTINUED | OUTPATIENT
Start: 2018-03-12 | End: 2018-03-16 | Stop reason: HOSPADM

## 2018-03-11 RX ORDER — METOPROLOL TARTRATE 25 MG/1
25 TABLET, FILM COATED ORAL EVERY 8 HOURS
Status: DISCONTINUED | OUTPATIENT
Start: 2018-03-11 | End: 2018-03-12

## 2018-03-11 RX ORDER — ALBUMIN, HUMAN INJ 5% 5 %
250 SOLUTION INTRAVENOUS
Status: DISCONTINUED | OUTPATIENT
Start: 2018-03-11 | End: 2018-03-11

## 2018-03-11 RX ADMIN — AMLODIPINE BESYLATE 5 MG: 5 TABLET ORAL at 09:32

## 2018-03-11 RX ADMIN — NITROGLYCERIN 1.97 MCG/KG/MIN: 20 INJECTION INTRAVENOUS at 06:53

## 2018-03-11 RX ADMIN — HYDRALAZINE HYDROCHLORIDE 25 MG: 25 TABLET ORAL at 05:59

## 2018-03-11 RX ADMIN — NITROGLYCERIN 1.97 MCG/KG/MIN: 20 INJECTION INTRAVENOUS at 16:46

## 2018-03-11 RX ADMIN — ISOSORBIDE MONONITRATE 120 MG: 60 TABLET, EXTENDED RELEASE ORAL at 21:08

## 2018-03-11 RX ADMIN — Medication 10 MG: at 21:28

## 2018-03-11 RX ADMIN — HYDRALAZINE HYDROCHLORIDE 25 MG: 25 TABLET ORAL at 13:03

## 2018-03-11 RX ADMIN — METOPROLOL TARTRATE 25 MG: 25 TABLET ORAL at 16:10

## 2018-03-11 RX ADMIN — GABAPENTIN 600 MG: 300 CAPSULE ORAL at 21:07

## 2018-03-11 RX ADMIN — NITROGLYCERIN: 5 INJECTION, SOLUTION INTRAVENOUS at 19:09

## 2018-03-11 RX ADMIN — Medication 4000 UNITS: at 10:07

## 2018-03-11 RX ADMIN — INSULIN GLARGINE 50 UNITS: 100 INJECTION, SOLUTION SUBCUTANEOUS at 12:59

## 2018-03-11 RX ADMIN — OMEGA-3-ACID ETHYL ESTERS 2 G: 1 CAPSULE, LIQUID FILLED ORAL at 09:32

## 2018-03-11 RX ADMIN — IRBESARTAN 150 MG: 75 TABLET ORAL at 21:28

## 2018-03-11 RX ADMIN — HEPARIN SODIUM 1450 UNITS/HR: 10000 INJECTION, SOLUTION INTRAVENOUS at 07:23

## 2018-03-11 RX ADMIN — INSULIN ASPART 10 UNITS: 100 INJECTION, SOLUTION INTRAVENOUS; SUBCUTANEOUS at 12:59

## 2018-03-11 RX ADMIN — Medication 10 MG: at 11:07

## 2018-03-11 RX ADMIN — ASPIRIN 325 MG: 325 TABLET, DELAYED RELEASE ORAL at 21:28

## 2018-03-11 RX ADMIN — NITROGLYCERIN 1.97 MCG/KG/MIN: 20 INJECTION INTRAVENOUS at 00:53

## 2018-03-11 RX ADMIN — DOLUTEGRAVIR SODIUM 50 MG: 50 TABLET, FILM COATED ORAL at 21:10

## 2018-03-11 RX ADMIN — METOPROLOL TARTRATE 25 MG: 25 TABLET ORAL at 23:24

## 2018-03-11 RX ADMIN — CLOPIDOGREL 75 MG: 75 TABLET, FILM COATED ORAL at 21:08

## 2018-03-11 RX ADMIN — DOLUTEGRAVIR SODIUM 50 MG: 50 TABLET, FILM COATED ORAL at 00:10

## 2018-03-11 RX ADMIN — Medication: at 09:38

## 2018-03-11 RX ADMIN — OMEPRAZOLE 40 MG: 20 CAPSULE, DELAYED RELEASE ORAL at 16:11

## 2018-03-11 RX ADMIN — DAPSONE 100 MG: 100 TABLET ORAL at 21:10

## 2018-03-11 RX ADMIN — IRBESARTAN 150 MG: 75 TABLET ORAL at 21:08

## 2018-03-11 RX ADMIN — INSULIN ASPART 10 UNITS: 100 INJECTION, SOLUTION INTRAVENOUS; SUBCUTANEOUS at 09:33

## 2018-03-11 RX ADMIN — NITROGLYCERIN 1.97 MCG/KG/MIN: 20 INJECTION INTRAVENOUS at 11:41

## 2018-03-11 RX ADMIN — Medication: at 21:43

## 2018-03-11 RX ADMIN — METOPROLOL TARTRATE 12.5 MG: 25 TABLET, FILM COATED ORAL at 09:33

## 2018-03-11 RX ADMIN — ABACAVIR 600 MG: 300 TABLET, FILM COATED ORAL at 21:08

## 2018-03-11 RX ADMIN — SODIUM CHLORIDE 250 ML: 9 INJECTION, SOLUTION INTRAVENOUS at 18:28

## 2018-03-11 RX ADMIN — MAGNESIUM OXIDE TAB 400 MG (241.3 MG ELEMENTAL MG) 400 MG: 400 (241.3 MG) TAB at 21:10

## 2018-03-11 RX ADMIN — DARUNAVIR 800 MG: 800 TABLET, FILM COATED ORAL at 21:10

## 2018-03-11 RX ADMIN — METOPROLOL TARTRATE 12.5 MG: 25 TABLET, FILM COATED ORAL at 00:10

## 2018-03-11 RX ADMIN — HEPARIN SODIUM 1600 UNITS/HR: 10000 INJECTION, SOLUTION INTRAVENOUS at 22:15

## 2018-03-11 RX ADMIN — OMEGA-3-ACID ETHYL ESTERS 2 G: 1 CAPSULE, LIQUID FILLED ORAL at 21:08

## 2018-03-11 RX ADMIN — HYDRALAZINE HYDROCHLORIDE 25 MG: 25 TABLET ORAL at 21:20

## 2018-03-11 RX ADMIN — GABAPENTIN 300 MG: 300 CAPSULE ORAL at 09:32

## 2018-03-11 RX ADMIN — RITONAVIR 100 MG: 100 TABLET, FILM COATED ORAL at 21:10

## 2018-03-11 RX ADMIN — Medication 1 CAPSULE: at 09:32

## 2018-03-11 RX ADMIN — ATORVASTATIN CALCIUM 40 MG: 40 TABLET, FILM COATED ORAL at 21:09

## 2018-03-11 ASSESSMENT — ACTIVITIES OF DAILY LIVING (ADL)
ADLS_ACUITY_SCORE: 11

## 2018-03-11 NOTE — PROGRESS NOTES
[facility]  Hospitalist Progress Note  Glenna Fernández MD 03/11/2018    Reason for Stay (Diagnosis): ACS with elevated troponin         Assessment and Plan:      Summary of Stay: Donald Raza is a 68 year old male with a history of CAD with prior stenting to the LAD, preserved EF  At 55-60 % by echo in 12/2017, ESRD on HD, T2dm, HIV on therapy, htn/hlp admitted on 3/8/2018 with CP and SOB with e/o hypoxic respiratory failure with sats 83 % requiring short term BiPAP rescue therapy, and persistent CP necessitating NTG gtt. He had mildly elevated trops.    Cardiology is on board and assisting with management, of note most recent angio completed 10/2017 showing no lesions that would require intervention and plan was for medical management but given persistence of symptoms despite ntg and heparin gtt plans are to pursue angio in am.     His brief hospitalization is notable for difficult to control hypertension, and ongoing troubles with anemia of unclear etiology necessitating 2 U pRBC transfusion   Problem List:   1. ACS/NSTEMI:  With mildly elevated troponin in setting of ESRD-peaked at 0.074 .  Per initial cardiology consult-suspect CP and mildly elevated trop due to uncontrolled hypertension and chronic anemia please see change in meds as outlined in #3-and recs for hgb > 8.0.  Remains on heparin and ntg gtt and in discussion with Dr. Santillan today - angio on Monday 3/12-apparently computer mishap yesterday made it impossible to complete  2. Hypoxic respiratory failure-due to diastolic HF with exacerbation in the setting of uncontrolled hypertension.  Per renal no e/o significant volume overload as at baseline dry weight 87 kg on presentation.  Was doing well but today seems more sob.  3. Htn:  Uncontrolled with BPs ranging from 150-210's.  pta medications were  mg, amlodipine 5 mg, hydralazine 25 mg bid, losartan 50 mg-currently on NTG gtt, amlodipine 5 mg, hydralazine 25 mg bid, and losartan  switched to irbesartan 150 mg.  BPs still uncontrolled, have added in prn labetalol, not clear why he's not on a BB at baseline, discussed with cards-plan for initiation of low dose metop bid (apparently ? If had troubles with bradycardia?) and hydralazine increased to 25 mg tid  4. Anemia; chronic-baseline hgb seems to be 8-9 range, but has had variable hgbs over the past year sometimes dipping into the 5 range.  Had screening EGD and colonoscopy in 2/17 and 5/17 respectively negative for bleeding. Denies any bleeding.  Last epo that I can find is in 2011 and measured at 16 (normal 4-27)  Per cards should try to keep > 8.0-no active bleeding identified, ? Just due to HD and destruction vs BM suppression from medications.  Is on IV iron with HD rec'd  1 U prbc on admission for hgb 7.1- with good response to 9.2, but then 7.1 again within 48 hours.  So transfused additional unit on HD 3/10/18 for a total of 2 U.  Response only to upper 7 range.  I;d be ok with additional transfusions but prefer to do it on HD-recheck in am   5. T2dm:  Home regimen is glargine 50 U sq qd, lispro 15 u tid with meals, as well as additional lispro per SS-currently back on glargine at 50 u and lispro 10 u tid with ISS and fair control.   6. HIV:  Cont home regimen of antivirals  7. Underlying CAD:  Cont clopidogrel/statin, and attempt better BP control as outlined above  8. HLP:  Cont with atorva 40 mg  9. Anxiety:  Cont with prn clonazepam as at home  10. ESRD:  Appreciate nephrology input-ESRD typically Tuesday/thursday/saturday  DVT Prophylaxis: currently on heparin gtt  Code Status: Full Code  # Pain Assessment:   Current Pain Score 3/11/2018 3/11/2018 3/10/2018   Patient currently in pain? denies yes sleeping: patient not able to self report   Pain score (0-10) - 0 -   Pain location - Foot -   Pain descriptors - - -   CPOT pain score - - -   - Donald is experiencing pain due to ankle pain. Pain management was discussed and the plan was  "created in a collaborative fashion.  Donald's response to the current recommendations: engaged  - Pharmacologic adjuvants: Acetaminophen        Discharge Dispo: home  Estimated Disch Date / # of Days until Disch: ? 2-3 days, depends on findings from angio/sob         Interval History (Subjective):      Denies any bleeding tendencies, no black tarry stools, no bruising, no epistaxis.  Reports egd and colonoscopy last year to try and identify source and eval negative-I was able to review those results in care everywhere.  \  He has been having some mild headaches but doesn't think it's the nitro-he says he has headaches  Periodically and this would not be out of the norm for him.  Denies sob but states he likes to have to oxygen on and wonders if he can get it at home.  Poor appetite.  No cp                   Physical Exam:      Last Vital Signs:  /80 (BP Location: Right leg)  Temp 97.6  F (36.4  C)  Resp 18  Ht 1.803 m (5' 11\")  Wt 91.9 kg (202 lb 9.6 oz)  SpO2 96%  BMI 28.26 kg/m2    I/O:  Net - 2.400 cc  Weight up 4.3 kg as on yesterday, not documented today   Tele:  NSR    Pleasant nad looks stated age, appears fatigued.  Head nc/at sclera normal pigmentation. RRR  3/6 harsh lizzette 1 +  le edema no r/g abd s/nt/nd alert and oriented affect appropriate evangelista looks like he feels poorer today and seems more sob but no accessory muscle use lungs diminished in the bases           Medications:      All current medications were reviewed with changes reflected in problem list.         Data:      All new lab and imaging data was reviewed.   Labs:    Recent Labs  Lab 03/11/18  1243      POTASSIUM 4.0   CHLORIDE 99   CO2 27   ANIONGAP 9   *   BUN 37*   CR 5.14*   GFRESTIMATED 11*   GFRESTBLACK 14*   PRABHA 8.9       Recent Labs  Lab 03/11/18  1243 03/10/18  0730   WBC  --  5.8   HGB 7.7* 7.1*   HCT  --  23.3*   MCV  --  94   PLT  --  160      Imaging:     "

## 2018-03-11 NOTE — PROGRESS NOTES
Potassium   Date Value Ref Range Status   03/11/2018 4.0 3.4 - 5.3 mmol/L Final     Lab Results   Component Value Date    HGB 7.7 03/11/2018     Weight: 90.2 kg (198 lb 12.8 oz)  POST WT 86.2kg  DIALYSIS PROCEDURE NOTE  Hepatitis status of previous patient on machine log was checked and verified ok to use with this patients hepatitis status.  Patient dialyzed for 2 hrs. on a 3 K bath with a net fluid removal of  4L.  A BFR of 400 ml/min was obtained via a LAF using 15gauge needles.Dr. Chaves was consulted of pt status and visited during the treatment.  Total heparin received during the treatment: 0 units. Sites held x 30 min then  pressure drsgs applied.  Meds  Given:2 UNITS PRBC's given on the run today. Complications: NONE.  Procedure and ESRD teaching done and questions answered. See flowsheet in EPIC for further details and post assessment.  Machine water alarm in place and functioning. Pt is on Nitro at 1.97mcg/kg/min and heparin at 1600units/hr IV  Pt returned via wheelchair  Vascular Access: Aseptic prep done for both on/off.  Report received from:Liliana WongRASHLEY  Report given to:Silvia Valladares R.n.  HEPATITIS B SURFACE ANTIGEN neg DATE 1/20/18 HEPATITIS B SURFACE ANTIBODY 1/20/18  Chlorine/Chloramine water system checked every 4 hours.  Outpatient Dialysis at [facility]

## 2018-03-11 NOTE — PROGRESS NOTES
[facility]     Renal Progress Note       SHORTHAND KEY FOR MY NOTES:  c = with, s = without, p = after, a = before, x = except, asx = asymptomatic, tx = transplant or treatment, sx = symptoms or symptomatic, cx = canceled or culture, rxn = reaction, yday = yesterday, nl = normal, abx = antibiotics, fxn = function, dx = diagnosis, dz = disease, m/h = melena/hematochezia, c/d/l/ha = cramping/dizziness/lightheadedness/headache, d/c = discharge or diarrhea/constipation, f/c/n/v = fevers/chills/nausea/vomiting, cp/sob = chest pain/shortness of breath.         Assessment/Plan:     1.  ESKD.  Pt dialysed yday and had 3L removed.  He is getting 75/h of fluids c his IVs and is already volume up again.    A.  Urgent UF run today - 2h c -4L as able.  B.  Next HD on Tues.    2.  Chest pain.  He is on NTG and heparin gtts.  Due for heart cath tmrw.  D/w Dr. Yan.  A.  Continue NTG gtt.  D/w PharmD to concentrate it so he doesn't get as much fluid.  B.  Cath tmrw.    3.  Anemia.  He rec'd 1 unit of blood c a slight rise in hb.  He is still pretty sob and has cardiac issues, so needs more blood.  A.  Plan to transfuse 2 units while on HD today.    4.  FEN.  Electrolytes are ok.  On low Na diet, but no k restriction.  A.  Add 1500 cc fluid restriction.        Interval History:     Pt initially says he feels fine, but when prodded, he says he isn't breathing well.  He also has some chest pressure at times.  He is getting 75/h of fluids (NTG, heparin, TKO).  He is also fatigued.      Tolerated HD yday s probs, but his BP remained high.  No HAs.          Medications and Allergies:       metoprolol tartrate  25 mg Oral Q8H     - MEDICATION INSTRUCTIONS for Dialysis Patients -   Does not apply See Admin Instructions     hydrALAZINE (APRESOLINE) tablet 25 mg  25 mg Oral Q8H JAN     sodium chloride (PF)  3 mL Intracatheter Q8H     abacavir  600 mg Oral QPM     amLODIPine (NORVASC) tablet 5 mg  5 mg Oral Daily      "atorvastatin  40 mg Oral At Bedtime     clopidogrel (PLAVIX) tablet 75 mg  75 mg Oral QPM     darunavir  800 mg Oral At Bedtime     dolutegravir  50 mg Oral At Bedtime     gabapentin  300 mg Oral QAM     gabapentin  600 mg Oral QPM     insulin glargine  50 Units Subcutaneous Daily     isosorbide mononitrate  120 mg Oral QPM     magnesium oxide (MAG-OX) tablet 400 mg  400 mg Oral At Bedtime     NEPRO   Oral BID     omega-3 acid ethyl esters  2 g Oral BID     omeprazole  40 mg Oral Daily     ritonavir  100 mg Oral At Bedtime     dapsone  100 mg Oral At Bedtime     insulin aspart  10 Units Subcutaneous TID w/meals     insulin aspart  1-7 Units Subcutaneous TID AC     insulin aspart  1-5 Units Subcutaneous At Bedtime     irbesartan  150 mg Oral At Bedtime     NEPHROCAPS  1 capsule Oral Daily        Allergies   Allergen Reactions     Calcium Acetate Other (See Comments)     Other reaction(s): Other, see comments  Pain in chest and back  Pain in chest area (sensitive skin)      Diagnostic X-Ray Materials Other (See Comments)     PN: renal failure     Lisinopril      Sulfa Drugs           Physical Exam:     Vitals were reviewed    Heart Rate: 81, Blood pressure 175/78, temperature 96.5  F (35.8  C), temperature source Oral, resp. rate 16, height 1.803 m (5' 11\"), weight 91.9 kg (202 lb 9.6 oz), SpO2 93 %.  Wt Readings from Last 3 Encounters:   03/10/18 91.9 kg (202 lb 9.6 oz)   02/01/18 87.8 kg (193 lb 9.6 oz)   02/01/18 90.3 kg (199 lb 1.2 oz)     Intake/Output Summary (Last 24 hours) at 03/11/18 1705  Last data filed at 03/11/18 0700   Gross per 24 hour   Intake             1650 ml   Output             3150 ml   Net            -1500 ml     GENERAL APPEARANCE: pleasant, sitting up in bed, doesn't look great  HEENT:  Eyes/ears/nose/neck grossly normal x + nc o2  RESP: lungs c some crackles, decreased bs at bases  CV: RRR c 2/6 m, nl S1/S2  ABDOMEN: o/s/nt/nd  EXTREMITIES/SKIN: tr ble edema         Data:     CBC " RESULTS:    Recent Labs  Lab 03/11/18  1243 03/10/18  0730 03/09/18  0709 03/09/18  0050 03/08/18  0542   WBC  --  5.8 6.1 6.6 4.7   RBC  --  2.47* 2.59* 3.10* 2.43*   HGB 7.7* 7.1* 7.5* 9.2* 7.1*   HCT  --  23.3* 25.1* 29.3* 22.9*   PLT  --  160 174 239 188     Basic Metabolic Panel:    Recent Labs  Lab 03/11/18  1243 03/10/18  0730 03/09/18  0709 03/09/18  0023 03/08/18  0542    139 136 137 138   POTASSIUM 4.0 4.9 4.3 4.2 3.8   CHLORIDE 99 104 101 100 102   CO2 27 26 30 29 25   BUN 37* 57* 44* 36* 61*   CR 5.14* 7.55* 5.56* 4.83* 8.08*   * 133* 138* 131* 161*   PRABHA 8.9 8.8 8.8 8.8 9.2     INRNo lab results found in last 7 days.   Attestation:   I have reviewed today's relevant vital signs, notes, medications, labs and imaging.    Bipin Chaves MD  [facility] - Nephrology  [phone]

## 2018-03-11 NOTE — PROGRESS NOTES
"[facility]    Cardiology Progress Note    Donald Raza is a 68 year old male who was admitted on 3/8/2018.      Assessment & Plan     A: 1)Chest pressure/SOB-?angina versus increased BP or both         2)HBP-incomplete control      3)Renal failure on dialysis      4)Anemia-Hgb pending after transfusion      5)ASCVD    P:  1)Telemetry       2)Continue IV heparin, NTG       3)Dialysis for volume management       4)Cardiac cath with coronary angiography rescheduled for 3/12-risks and benefits briefly discussed with patient(done prior to planned procedure on Friday)       5)Further adjust hydralazine/amlodipine/metoprolol  Time Spent: > than 35 minutes of complex medical care    Gerson Yan MD [facility]    Interval History   S:   C/o some chest heaviness with SOB this AM with increased BP-relieved when given anti-HBP Rx    Physical Exam   Vitals: Blood pressure 152/74, temperature 97.6  F (36.4  C), resp. rate 18, height 1.803 m (5' 11\"), weight 91.9 kg (202 lb 9.6 oz), SpO2 93 %., Heart Rate: 76, Wt Readings from Last 4 Encounters:   03/10/18 91.9 kg (202 lb 9.6 oz)   02/01/18 87.8 kg (193 lb 9.6 oz)   02/01/18 90.3 kg (199 lb 1.2 oz)   01/29/18 91.3 kg (201 lb 4.8 oz)       I/O last 3 completed shifts:  In: 1680 [P.O.:1380]  Out: 3150 [Urine:150; Other:3000]    Lungs:  Decreased BS at bases with crackles and scattered rhonchi  Heart: RR, S4, 1-2/6 systolic murmur  Extremities: 1-2+ edema      Medications     HEParin 1,600 Units/hr (03/11/18 1007)     nitroGLYcerin 1.97 mcg/kg/min (03/11/18 1141)     - MEDICATION INSTRUCTIONS -       - MEDICATION INSTRUCTIONS -       sodium chloride 30 mL/hr at 03/10/18 2110     Continuing ACE inhibitor/ARB/ARNI from home medication list OR ACE inhibitor/ARB order already placed during this visit       - MEDICATION INSTRUCTIONS -         - MEDICATION INSTRUCTIONS for Dialysis Patients -   Does not apply See Admin Instructions     hydrALAZINE (APRESOLINE) tablet 25 mg  25 " mg Oral Q8H JAN     metoprolol tartrate  12.5 mg Oral Q8H     sodium chloride (PF)  3 mL Intracatheter Q8H     abacavir  600 mg Oral QPM     amLODIPine (NORVASC) tablet 5 mg  5 mg Oral Daily     atorvastatin  40 mg Oral At Bedtime     clopidogrel (PLAVIX) tablet 75 mg  75 mg Oral QPM     darunavir  800 mg Oral At Bedtime     dolutegravir  50 mg Oral At Bedtime     gabapentin  300 mg Oral QAM     gabapentin  600 mg Oral QPM     insulin glargine  50 Units Subcutaneous Daily     isosorbide mononitrate  120 mg Oral QPM     magnesium oxide (MAG-OX) tablet 400 mg  400 mg Oral At Bedtime     NEPRO   Oral BID     omega-3 acid ethyl esters  2 g Oral BID     omeprazole  40 mg Oral Daily     ritonavir  100 mg Oral At Bedtime     dapsone  100 mg Oral At Bedtime     insulin aspart  10 Units Subcutaneous TID w/meals     insulin aspart  1-7 Units Subcutaneous TID AC     insulin aspart  1-5 Units Subcutaneous At Bedtime     irbesartan  150 mg Oral At Bedtime     NEPHROCAPS  1 capsule Oral Daily          All laboratory data reviewed  ROUTINE IP LABS (Last four results)  BMP  Recent Labs  Lab 03/10/18  0730 03/09/18  0709 03/09/18  0023 03/08/18  0542    136 137 138   POTASSIUM 4.9 4.3 4.2 3.8   CHLORIDE 104 101 100 102   PRABHA 8.8 8.8 8.8 9.2   CO2 26 30 29 25   BUN 57* 44* 36* 61*   CR 7.55* 5.56* 4.83* 8.08*   * 138* 131* 161*     CBC  Recent Labs  Lab 03/10/18  0730 03/09/18  0709 03/09/18  0050 03/08/18  0542   WBC 5.8 6.1 6.6 4.7   RBC 2.47* 2.59* 3.10* 2.43*   HGB 7.1* 7.5* 9.2* 7.1*   HCT 23.3* 25.1* 29.3* 22.9*   MCV 94 97 95 94   MCH 28.7 29.0 29.7 29.2   MCHC 30.5* 29.9* 31.4* 31.0*   RDW 15.9* 15.7* 15.6* 15.7*    174 239 188     INRNo lab results found in last 7 days. Lab Results   Component Value Date    TROPI 0.065 (H) 03/09/2018    TROPI 0.074 (H) 03/09/2018    TROPI 0.058 (H) 03/09/2018    TROPI 0.052 (H) 03/08/2018    TROPI 0.045 03/08/2018    TROPONIN 0.02 03/08/2018    TROPONIN 0.02 12/25/2017     TROPONIN 0.02 04/15/2017    TROPONIN 0.02 05/19/2016         EKG results:  Reviewed if available.   Performed on 3/11/2018        Results:  Sinus rhythm

## 2018-03-11 NOTE — PLAN OF CARE
Problem: Patient Care Overview  Goal: Plan of Care/Patient Progress Review  A/O. BP continues to be high, IV labetalol for >180, given x2. Metoprolol added and hydralazine increased per cardiology. Offered education on medications, pt refused handout and verbal review of medications provided. B, 178, and 141. Tele: SR. HD today. Plan for angio on Monday.

## 2018-03-11 NOTE — PLAN OF CARE
Problem: Patient Care Overview  Goal: Plan of Care/Patient Progress Review  Pt alert and oriented. Some complaints for SOB and dyspneic at times, 2 LPM oxygen. Pt currently getting dialysis with 2 units of PRBCs. Pt placed on 1500 ml FR.  and 133. BP remains high today 160-190's, IV labetalol given x1. Tele: SR. Heparin and nitro gtt infusing. Plan for angio tomorrow.

## 2018-03-11 NOTE — PLAN OF CARE
Problem: Patient Care Overview  Goal: Plan of Care/Patient Progress Review  Outcome: No Change  Orientation: Alert and oriented x4. Awake, alert, cooperative, no apparent distress.    VS. 89% on RA, switched to 1 LPM NC for sleep, pt Sats decrease when sleeping. Afebrile. BP's elevated, scheduled Hydralazine and metoprolol given, labetalol x1 dose given for systolic BP >180  IVF: NS @ 10 ml/hr, Nitro gtt @ 1.97 mcg/kg/min, Hep gtt 14.5 ml/hr  Tele: SR. HR 80s. Regular rate and rhythm, no murmur noted  LS: clear and equal bilaterally. Clear to auscultation bilaterally.  No wheezing  GI: Passing gas. No BM. Denies N/V.  Normal bowel sounds, soft, non-distended, non-tender  : No urine output, pt on hemodialysis    Skin: bruising noted, Skin otherwise intact. No rashes, no cyanosis, dry to touch  Activity: SBA. Pt slept comfortably throughout shift.   Pain: 7/10 HA pain. Tylenol given. Pt sleeping.  DC Plan: Nitro gtt infusing  At max for 24 hrs level with no change in BP's, Continue with current cares.

## 2018-03-12 ENCOUNTER — APPOINTMENT (OUTPATIENT)
Dept: CARDIOLOGY | Facility: CLINIC | Age: 68
DRG: 280 | End: 2018-03-12
Attending: INTERNAL MEDICINE
Payer: MEDICARE

## 2018-03-12 LAB
ANION GAP SERPL CALCULATED.3IONS-SCNC: 7 MMOL/L (ref 3–14)
BUN SERPL-MCNC: 32 MG/DL (ref 7–30)
CALCIUM SERPL-MCNC: 9.3 MG/DL (ref 8.5–10.1)
CHLORIDE SERPL-SCNC: 97 MMOL/L (ref 94–109)
CO2 SERPL-SCNC: 30 MMOL/L (ref 20–32)
CREAT SERPL-MCNC: 4.73 MG/DL (ref 0.66–1.25)
EPO SERPL-ACNC: 12 MU/ML (ref 4–27)
ERYTHROCYTE [DISTWIDTH] IN BLOOD BY AUTOMATED COUNT: 15.9 % (ref 10–15)
GFR SERPL CREATININE-BSD FRML MDRD: 12 ML/MIN/1.7M2
GLUCOSE BLDC GLUCOMTR-MCNC: 109 MG/DL (ref 70–99)
GLUCOSE BLDC GLUCOMTR-MCNC: 115 MG/DL (ref 70–99)
GLUCOSE BLDC GLUCOMTR-MCNC: 122 MG/DL (ref 70–99)
GLUCOSE BLDC GLUCOMTR-MCNC: 131 MG/DL (ref 70–99)
GLUCOSE BLDC GLUCOMTR-MCNC: 87 MG/DL (ref 70–99)
GLUCOSE BLDC GLUCOMTR-MCNC: 99 MG/DL (ref 70–99)
GLUCOSE SERPL-MCNC: 124 MG/DL (ref 70–99)
HCT VFR BLD AUTO: 29.3 % (ref 40–53)
HGB BLD-MCNC: 9.1 G/DL (ref 13.3–17.7)
LMWH PPP CHRO-ACNC: 0.13 IU/ML
MCH RBC QN AUTO: 29.3 PG (ref 26.5–33)
MCHC RBC AUTO-ENTMCNC: 31.1 G/DL (ref 31.5–36.5)
MCV RBC AUTO: 94 FL (ref 78–100)
PLATELET # BLD AUTO: 155 10E9/L (ref 150–450)
POTASSIUM SERPL-SCNC: 3.8 MMOL/L (ref 3.4–5.3)
RBC # BLD AUTO: 3.11 10E12/L (ref 4.4–5.9)
SODIUM SERPL-SCNC: 134 MMOL/L (ref 133–144)
WBC # BLD AUTO: 6.1 10E9/L (ref 4–11)

## 2018-03-12 PROCEDURE — 12000047 ZZH R&B IMCU

## 2018-03-12 PROCEDURE — A9270 NON-COVERED ITEM OR SERVICE: HCPCS | Mod: GY | Performed by: INTERNAL MEDICINE

## 2018-03-12 PROCEDURE — 25000132 ZZH RX MED GY IP 250 OP 250 PS 637: Mod: GY | Performed by: INTERNAL MEDICINE

## 2018-03-12 PROCEDURE — 85027 COMPLETE CBC AUTOMATED: CPT | Performed by: INTERNAL MEDICINE

## 2018-03-12 PROCEDURE — 27210742 ZZH CATH CR1

## 2018-03-12 PROCEDURE — 85520 HEPARIN ASSAY: CPT | Performed by: INTERNAL MEDICINE

## 2018-03-12 PROCEDURE — 93458 L HRT ARTERY/VENTRICLE ANGIO: CPT

## 2018-03-12 PROCEDURE — 99233 SBSQ HOSP IP/OBS HIGH 50: CPT | Performed by: INTERNAL MEDICINE

## 2018-03-12 PROCEDURE — 25000128 H RX IP 250 OP 636: Performed by: INTERNAL MEDICINE

## 2018-03-12 PROCEDURE — 4A023N7 MEASUREMENT OF CARDIAC SAMPLING AND PRESSURE, LEFT HEART, PERCUTANEOUS APPROACH: ICD-10-PCS | Performed by: INTERNAL MEDICINE

## 2018-03-12 PROCEDURE — 27210827 ZZH KIT ACIST INJECTOR CR6

## 2018-03-12 PROCEDURE — 25000132 ZZH RX MED GY IP 250 OP 250 PS 637: Mod: GY | Performed by: PHYSICIAN ASSISTANT

## 2018-03-12 PROCEDURE — 99232 SBSQ HOSP IP/OBS MODERATE 35: CPT | Mod: 24 | Performed by: INTERNAL MEDICINE

## 2018-03-12 PROCEDURE — 93458 L HRT ARTERY/VENTRICLE ANGIO: CPT | Mod: 26 | Performed by: INTERNAL MEDICINE

## 2018-03-12 PROCEDURE — 40000275 ZZH STATISTIC RCP TIME EA 10 MIN

## 2018-03-12 PROCEDURE — B2151ZZ FLUOROSCOPY OF LEFT HEART USING LOW OSMOLAR CONTRAST: ICD-10-PCS | Performed by: INTERNAL MEDICINE

## 2018-03-12 PROCEDURE — 25000131 ZZH RX MED GY IP 250 OP 636 PS 637: Mod: GY | Performed by: PHYSICIAN ASSISTANT

## 2018-03-12 PROCEDURE — A9270 NON-COVERED ITEM OR SERVICE: HCPCS | Mod: GY | Performed by: PHYSICIAN ASSISTANT

## 2018-03-12 PROCEDURE — 27210946 ZZH KIT HC TOTES DISP CR8

## 2018-03-12 PROCEDURE — 99153 MOD SED SAME PHYS/QHP EA: CPT

## 2018-03-12 PROCEDURE — 80048 BASIC METABOLIC PNL TOTAL CA: CPT | Performed by: INTERNAL MEDICINE

## 2018-03-12 PROCEDURE — 99152 MOD SED SAME PHYS/QHP 5/>YRS: CPT

## 2018-03-12 PROCEDURE — 36415 COLL VENOUS BLD VENIPUNCTURE: CPT | Performed by: INTERNAL MEDICINE

## 2018-03-12 PROCEDURE — 25000125 ZZHC RX 250: Performed by: INTERNAL MEDICINE

## 2018-03-12 PROCEDURE — 93010 ELECTROCARDIOGRAM REPORT: CPT | Mod: 59 | Performed by: INTERNAL MEDICINE

## 2018-03-12 PROCEDURE — 00000146 ZZHCL STATISTIC GLUCOSE BY METER IP

## 2018-03-12 PROCEDURE — C1769 GUIDE WIRE: HCPCS

## 2018-03-12 PROCEDURE — B2111ZZ FLUOROSCOPY OF MULTIPLE CORONARY ARTERIES USING LOW OSMOLAR CONTRAST: ICD-10-PCS | Performed by: INTERNAL MEDICINE

## 2018-03-12 PROCEDURE — 99152 MOD SED SAME PHYS/QHP 5/>YRS: CPT | Performed by: INTERNAL MEDICINE

## 2018-03-12 PROCEDURE — 93005 ELECTROCARDIOGRAM TRACING: CPT

## 2018-03-12 RX ORDER — CLOPIDOGREL BISULFATE 75 MG/1
75 TABLET ORAL
Status: DISCONTINUED | OUTPATIENT
Start: 2018-03-12 | End: 2018-03-12 | Stop reason: HOSPADM

## 2018-03-12 RX ORDER — PHENYLEPHRINE HCL IN 0.9% NACL 1 MG/10 ML
20-100 SYRINGE (ML) INTRAVENOUS
Status: DISCONTINUED | OUTPATIENT
Start: 2018-03-12 | End: 2018-03-12 | Stop reason: HOSPADM

## 2018-03-12 RX ORDER — NALOXONE HYDROCHLORIDE 0.4 MG/ML
.1-.4 INJECTION, SOLUTION INTRAMUSCULAR; INTRAVENOUS; SUBCUTANEOUS
Status: DISCONTINUED | OUTPATIENT
Start: 2018-03-12 | End: 2018-03-12

## 2018-03-12 RX ORDER — LIDOCAINE 40 MG/G
CREAM TOPICAL
Status: DISCONTINUED | OUTPATIENT
Start: 2018-03-12 | End: 2018-03-12

## 2018-03-12 RX ORDER — SODIUM CHLORIDE 9 MG/ML
INJECTION, SOLUTION INTRAVENOUS CONTINUOUS
Status: DISCONTINUED | OUTPATIENT
Start: 2018-03-12 | End: 2018-03-16 | Stop reason: HOSPADM

## 2018-03-12 RX ORDER — FENTANYL CITRATE 50 UG/ML
25-50 INJECTION, SOLUTION INTRAMUSCULAR; INTRAVENOUS
Status: ACTIVE | OUTPATIENT
Start: 2018-03-12 | End: 2018-03-13

## 2018-03-12 RX ORDER — DIPHENHYDRAMINE HYDROCHLORIDE 50 MG/ML
25-50 INJECTION INTRAMUSCULAR; INTRAVENOUS
Status: DISCONTINUED | OUTPATIENT
Start: 2018-03-12 | End: 2018-03-12 | Stop reason: HOSPADM

## 2018-03-12 RX ORDER — DOPAMINE HYDROCHLORIDE 160 MG/100ML
2-20 INJECTION, SOLUTION INTRAVENOUS CONTINUOUS PRN
Status: DISCONTINUED | OUTPATIENT
Start: 2018-03-12 | End: 2018-03-12 | Stop reason: HOSPADM

## 2018-03-12 RX ORDER — NIFEDIPINE 10 MG/1
10 CAPSULE ORAL
Status: DISCONTINUED | OUTPATIENT
Start: 2018-03-12 | End: 2018-03-12 | Stop reason: HOSPADM

## 2018-03-12 RX ORDER — PRASUGREL 10 MG/1
10-60 TABLET, FILM COATED ORAL
Status: DISCONTINUED | OUTPATIENT
Start: 2018-03-12 | End: 2018-03-12 | Stop reason: HOSPADM

## 2018-03-12 RX ORDER — NITROGLYCERIN 5 MG/ML
VIAL (ML) INTRAVENOUS
Status: DISCONTINUED
Start: 2018-03-12 | End: 2018-03-12 | Stop reason: HOSPADM

## 2018-03-12 RX ORDER — POTASSIUM CHLORIDE 29.8 MG/ML
20 INJECTION INTRAVENOUS
Status: DISCONTINUED | OUTPATIENT
Start: 2018-03-12 | End: 2018-03-12 | Stop reason: HOSPADM

## 2018-03-12 RX ORDER — POTASSIUM CHLORIDE 7.45 MG/ML
10 INJECTION INTRAVENOUS
Status: DISCONTINUED | OUTPATIENT
Start: 2018-03-12 | End: 2018-03-12 | Stop reason: HOSPADM

## 2018-03-12 RX ORDER — FUROSEMIDE 10 MG/ML
20-100 INJECTION INTRAMUSCULAR; INTRAVENOUS
Status: DISCONTINUED | OUTPATIENT
Start: 2018-03-12 | End: 2018-03-12 | Stop reason: HOSPADM

## 2018-03-12 RX ORDER — HEPARIN SODIUM 1000 [USP'U]/ML
INJECTION, SOLUTION INTRAVENOUS; SUBCUTANEOUS
Status: DISCONTINUED
Start: 2018-03-12 | End: 2018-03-12 | Stop reason: HOSPADM

## 2018-03-12 RX ORDER — NICARDIPINE HYDROCHLORIDE 2.5 MG/ML
100 INJECTION INTRAVENOUS
Status: DISCONTINUED | OUTPATIENT
Start: 2018-03-12 | End: 2018-03-12 | Stop reason: HOSPADM

## 2018-03-12 RX ORDER — LIDOCAINE HYDROCHLORIDE 10 MG/ML
1-10 INJECTION, SOLUTION EPIDURAL; INFILTRATION; INTRACAUDAL; PERINEURAL
Status: COMPLETED | OUTPATIENT
Start: 2018-03-12 | End: 2018-03-12

## 2018-03-12 RX ORDER — MORPHINE SULFATE 2 MG/ML
1-2 INJECTION, SOLUTION INTRAMUSCULAR; INTRAVENOUS EVERY 5 MIN PRN
Status: DISCONTINUED | OUTPATIENT
Start: 2018-03-12 | End: 2018-03-12 | Stop reason: HOSPADM

## 2018-03-12 RX ORDER — BUPIVACAINE HYDROCHLORIDE 2.5 MG/ML
1-10 INJECTION, SOLUTION EPIDURAL; INFILTRATION; INTRACAUDAL
Status: DISCONTINUED | OUTPATIENT
Start: 2018-03-12 | End: 2018-03-12 | Stop reason: HOSPADM

## 2018-03-12 RX ORDER — NITROGLYCERIN 5 MG/ML
100-500 VIAL (ML) INTRAVENOUS
Status: DISCONTINUED | OUTPATIENT
Start: 2018-03-12 | End: 2018-03-12 | Stop reason: HOSPADM

## 2018-03-12 RX ORDER — LORAZEPAM 0.5 MG/1
0.5 TABLET ORAL
Status: DISCONTINUED | OUTPATIENT
Start: 2018-03-12 | End: 2018-03-12 | Stop reason: HOSPADM

## 2018-03-12 RX ORDER — IOPAMIDOL 755 MG/ML
100 INJECTION, SOLUTION INTRAVASCULAR ONCE
Status: DISCONTINUED | OUTPATIENT
Start: 2018-03-12 | End: 2018-03-16 | Stop reason: CLARIF

## 2018-03-12 RX ORDER — METHYLPREDNISOLONE SODIUM SUCCINATE 125 MG/2ML
125 INJECTION, POWDER, LYOPHILIZED, FOR SOLUTION INTRAMUSCULAR; INTRAVENOUS
Status: DISCONTINUED | OUTPATIENT
Start: 2018-03-12 | End: 2018-03-12 | Stop reason: HOSPADM

## 2018-03-12 RX ORDER — LORAZEPAM 2 MG/ML
0.5 INJECTION INTRAMUSCULAR
Status: DISCONTINUED | OUTPATIENT
Start: 2018-03-12 | End: 2018-03-12 | Stop reason: HOSPADM

## 2018-03-12 RX ORDER — CARVEDILOL 12.5 MG/1
12.5 TABLET ORAL 2 TIMES DAILY WITH MEALS
Status: DISCONTINUED | OUTPATIENT
Start: 2018-03-12 | End: 2018-03-16 | Stop reason: HOSPADM

## 2018-03-12 RX ORDER — ENALAPRILAT 1.25 MG/ML
1.25-2.5 INJECTION INTRAVENOUS
Status: DISCONTINUED | OUTPATIENT
Start: 2018-03-12 | End: 2018-03-12 | Stop reason: HOSPADM

## 2018-03-12 RX ORDER — FLUMAZENIL 0.1 MG/ML
0.2 INJECTION, SOLUTION INTRAVENOUS
Status: DISCONTINUED | OUTPATIENT
Start: 2018-03-12 | End: 2018-03-12 | Stop reason: HOSPADM

## 2018-03-12 RX ORDER — LORAZEPAM 2 MG/ML
.5-2 INJECTION INTRAMUSCULAR EVERY 4 HOURS PRN
Status: DISCONTINUED | OUTPATIENT
Start: 2018-03-12 | End: 2018-03-12 | Stop reason: HOSPADM

## 2018-03-12 RX ORDER — CLOPIDOGREL 300 MG/1
300-600 TABLET, FILM COATED ORAL
Status: DISCONTINUED | OUTPATIENT
Start: 2018-03-12 | End: 2018-03-12 | Stop reason: HOSPADM

## 2018-03-12 RX ORDER — EPINEPHRINE 1 MG/ML
0.3 INJECTION, SOLUTION, CONCENTRATE INTRAVENOUS
Status: DISCONTINUED | OUTPATIENT
Start: 2018-03-12 | End: 2018-03-12 | Stop reason: HOSPADM

## 2018-03-12 RX ORDER — METOPROLOL TARTRATE 1 MG/ML
5 INJECTION, SOLUTION INTRAVENOUS EVERY 5 MIN PRN
Status: DISCONTINUED | OUTPATIENT
Start: 2018-03-12 | End: 2018-03-12 | Stop reason: HOSPADM

## 2018-03-12 RX ORDER — ATROPINE SULFATE 0.1 MG/ML
0.5 INJECTION INTRAVENOUS EVERY 5 MIN PRN
Status: ACTIVE | OUTPATIENT
Start: 2018-03-12 | End: 2018-03-13

## 2018-03-12 RX ORDER — LIDOCAINE HYDROCHLORIDE 10 MG/ML
INJECTION, SOLUTION EPIDURAL; INFILTRATION; INTRACAUDAL; PERINEURAL
Status: DISCONTINUED
Start: 2018-03-12 | End: 2018-03-12 | Stop reason: HOSPADM

## 2018-03-12 RX ORDER — SODIUM NITROPRUSSIDE 25 MG/ML
100-200 INJECTION INTRAVENOUS
Status: DISCONTINUED | OUTPATIENT
Start: 2018-03-12 | End: 2018-03-12 | Stop reason: HOSPADM

## 2018-03-12 RX ORDER — NITROGLYCERIN 20 MG/100ML
.07-2 INJECTION INTRAVENOUS CONTINUOUS PRN
Status: DISCONTINUED | OUTPATIENT
Start: 2018-03-12 | End: 2018-03-12 | Stop reason: HOSPADM

## 2018-03-12 RX ORDER — ONDANSETRON 2 MG/ML
4 INJECTION INTRAMUSCULAR; INTRAVENOUS EVERY 4 HOURS PRN
Status: DISCONTINUED | OUTPATIENT
Start: 2018-03-12 | End: 2018-03-12 | Stop reason: HOSPADM

## 2018-03-12 RX ORDER — ASPIRIN 81 MG/1
81-324 TABLET, CHEWABLE ORAL
Status: DISCONTINUED | OUTPATIENT
Start: 2018-03-12 | End: 2018-03-12 | Stop reason: HOSPADM

## 2018-03-12 RX ORDER — ASPIRIN 325 MG
325 TABLET ORAL
Status: DISCONTINUED | OUTPATIENT
Start: 2018-03-12 | End: 2018-03-12 | Stop reason: HOSPADM

## 2018-03-12 RX ORDER — HYDROCODONE BITARTRATE AND ACETAMINOPHEN 5; 325 MG/1; MG/1
1-2 TABLET ORAL EVERY 4 HOURS PRN
Status: DISCONTINUED | OUTPATIENT
Start: 2018-03-12 | End: 2018-03-16 | Stop reason: HOSPADM

## 2018-03-12 RX ORDER — LIDOCAINE 40 MG/G
CREAM TOPICAL
Status: DISCONTINUED | OUTPATIENT
Start: 2018-03-12 | End: 2018-03-15

## 2018-03-12 RX ORDER — DEXTROSE MONOHYDRATE 25 G/50ML
12.5-5 INJECTION, SOLUTION INTRAVENOUS EVERY 30 MIN PRN
Status: DISCONTINUED | OUTPATIENT
Start: 2018-03-12 | End: 2018-03-12 | Stop reason: HOSPADM

## 2018-03-12 RX ORDER — NITROGLYCERIN 0.4 MG/1
0.4 TABLET SUBLINGUAL EVERY 5 MIN PRN
Status: DISCONTINUED | OUTPATIENT
Start: 2018-03-12 | End: 2018-03-12 | Stop reason: HOSPADM

## 2018-03-12 RX ORDER — HEPARIN SODIUM 1000 [USP'U]/ML
1000-10000 INJECTION, SOLUTION INTRAVENOUS; SUBCUTANEOUS EVERY 5 MIN PRN
Status: DISCONTINUED | OUTPATIENT
Start: 2018-03-12 | End: 2018-03-12 | Stop reason: HOSPADM

## 2018-03-12 RX ORDER — DOBUTAMINE HYDROCHLORIDE 200 MG/100ML
2-20 INJECTION INTRAVENOUS CONTINUOUS PRN
Status: DISCONTINUED | OUTPATIENT
Start: 2018-03-12 | End: 2018-03-12 | Stop reason: HOSPADM

## 2018-03-12 RX ORDER — POTASSIUM CHLORIDE 1500 MG/1
20 TABLET, EXTENDED RELEASE ORAL
Status: DISCONTINUED | OUTPATIENT
Start: 2018-03-12 | End: 2018-03-12 | Stop reason: HOSPADM

## 2018-03-12 RX ORDER — HYDRALAZINE HYDROCHLORIDE 20 MG/ML
10-20 INJECTION INTRAMUSCULAR; INTRAVENOUS
Status: DISCONTINUED | OUTPATIENT
Start: 2018-03-12 | End: 2018-03-12 | Stop reason: HOSPADM

## 2018-03-12 RX ORDER — PROTAMINE SULFATE 10 MG/ML
25-100 INJECTION, SOLUTION INTRAVENOUS EVERY 5 MIN PRN
Status: DISCONTINUED | OUTPATIENT
Start: 2018-03-12 | End: 2018-03-12 | Stop reason: HOSPADM

## 2018-03-12 RX ORDER — NALOXONE HYDROCHLORIDE 0.4 MG/ML
.2-.4 INJECTION, SOLUTION INTRAMUSCULAR; INTRAVENOUS; SUBCUTANEOUS
Status: DISCONTINUED | OUTPATIENT
Start: 2018-03-12 | End: 2018-03-12

## 2018-03-12 RX ORDER — ACETAMINOPHEN 325 MG/1
325-650 TABLET ORAL EVERY 4 HOURS PRN
Status: DISCONTINUED | OUTPATIENT
Start: 2018-03-12 | End: 2018-03-12

## 2018-03-12 RX ORDER — ASPIRIN 81 MG/1
81 TABLET ORAL DAILY
Status: DISCONTINUED | OUTPATIENT
Start: 2018-03-13 | End: 2018-03-16 | Stop reason: HOSPADM

## 2018-03-12 RX ORDER — LIDOCAINE HYDROCHLORIDE 10 MG/ML
30 INJECTION, SOLUTION EPIDURAL; INFILTRATION; INTRACAUDAL; PERINEURAL
Status: DISCONTINUED | OUTPATIENT
Start: 2018-03-12 | End: 2018-03-12 | Stop reason: HOSPADM

## 2018-03-12 RX ORDER — ATROPINE SULFATE 0.1 MG/ML
.5-1 INJECTION INTRAVENOUS
Status: DISCONTINUED | OUTPATIENT
Start: 2018-03-12 | End: 2018-03-12 | Stop reason: HOSPADM

## 2018-03-12 RX ORDER — FENTANYL CITRATE 50 UG/ML
INJECTION, SOLUTION INTRAMUSCULAR; INTRAVENOUS
Status: DISCONTINUED
Start: 2018-03-12 | End: 2018-03-12 | Stop reason: HOSPADM

## 2018-03-12 RX ORDER — FENTANYL CITRATE 50 UG/ML
25-50 INJECTION, SOLUTION INTRAMUSCULAR; INTRAVENOUS
Status: DISCONTINUED | OUTPATIENT
Start: 2018-03-12 | End: 2018-03-12 | Stop reason: HOSPADM

## 2018-03-12 RX ORDER — PROTAMINE SULFATE 10 MG/ML
1-5 INJECTION, SOLUTION INTRAVENOUS
Status: DISCONTINUED | OUTPATIENT
Start: 2018-03-12 | End: 2018-03-12 | Stop reason: HOSPADM

## 2018-03-12 RX ORDER — PROMETHAZINE HYDROCHLORIDE 25 MG/ML
6.25-25 INJECTION, SOLUTION INTRAMUSCULAR; INTRAVENOUS EVERY 4 HOURS PRN
Status: DISCONTINUED | OUTPATIENT
Start: 2018-03-12 | End: 2018-03-12 | Stop reason: HOSPADM

## 2018-03-12 RX ORDER — NITROGLYCERIN 5 MG/ML
100-200 VIAL (ML) INTRAVENOUS
Status: DISCONTINUED | OUTPATIENT
Start: 2018-03-12 | End: 2018-03-12 | Stop reason: HOSPADM

## 2018-03-12 RX ORDER — FLUMAZENIL 0.1 MG/ML
0.2 INJECTION, SOLUTION INTRAVENOUS
Status: ACTIVE | OUTPATIENT
Start: 2018-03-12 | End: 2018-03-13

## 2018-03-12 RX ORDER — NALOXONE HYDROCHLORIDE 0.4 MG/ML
0.4 INJECTION, SOLUTION INTRAMUSCULAR; INTRAVENOUS; SUBCUTANEOUS EVERY 5 MIN PRN
Status: DISCONTINUED | OUTPATIENT
Start: 2018-03-12 | End: 2018-03-12 | Stop reason: HOSPADM

## 2018-03-12 RX ORDER — ADENOSINE 3 MG/ML
12-12000 INJECTION, SOLUTION INTRAVENOUS
Status: DISCONTINUED | OUTPATIENT
Start: 2018-03-12 | End: 2018-03-12 | Stop reason: HOSPADM

## 2018-03-12 RX ORDER — VERAPAMIL HYDROCHLORIDE 2.5 MG/ML
1-2.5 INJECTION, SOLUTION INTRAVENOUS
Status: DISCONTINUED | OUTPATIENT
Start: 2018-03-12 | End: 2018-03-12 | Stop reason: HOSPADM

## 2018-03-12 RX ORDER — IOPAMIDOL 755 MG/ML
100 INJECTION, SOLUTION INTRAVASCULAR ONCE
Status: COMPLETED | OUTPATIENT
Start: 2018-03-12 | End: 2018-03-12

## 2018-03-12 RX ADMIN — MAGNESIUM OXIDE TAB 400 MG (241.3 MG ELEMENTAL MG) 400 MG: 400 (241.3 MG) TAB at 21:58

## 2018-03-12 RX ADMIN — OMEGA-3-ACID ETHYL ESTERS 2 G: 1 CAPSULE, LIQUID FILLED ORAL at 10:44

## 2018-03-12 RX ADMIN — NITROGLYCERIN: 5 INJECTION, SOLUTION INTRAVENOUS at 04:40

## 2018-03-12 RX ADMIN — OMEPRAZOLE 40 MG: 20 CAPSULE, DELAYED RELEASE ORAL at 16:57

## 2018-03-12 RX ADMIN — DARUNAVIR 800 MG: 800 TABLET, FILM COATED ORAL at 21:58

## 2018-03-12 RX ADMIN — FENTANYL CITRATE 25 MCG: 50 INJECTION INTRAMUSCULAR; INTRAVENOUS at 13:02

## 2018-03-12 RX ADMIN — ISOSORBIDE MONONITRATE 120 MG: 60 TABLET, EXTENDED RELEASE ORAL at 20:01

## 2018-03-12 RX ADMIN — HYDRALAZINE HYDROCHLORIDE 25 MG: 25 TABLET ORAL at 21:57

## 2018-03-12 RX ADMIN — GABAPENTIN 600 MG: 300 CAPSULE ORAL at 20:01

## 2018-03-12 RX ADMIN — LIDOCAINE HYDROCHLORIDE 8 ML: 10 INJECTION, SOLUTION EPIDURAL; INFILTRATION; INTRACAUDAL; PERINEURAL at 13:02

## 2018-03-12 RX ADMIN — CLOPIDOGREL 75 MG: 75 TABLET, FILM COATED ORAL at 20:01

## 2018-03-12 RX ADMIN — IOPAMIDOL 85 ML: 755 INJECTION, SOLUTION INTRAVASCULAR at 12:15

## 2018-03-12 RX ADMIN — ATORVASTATIN CALCIUM 40 MG: 40 TABLET, FILM COATED ORAL at 21:58

## 2018-03-12 RX ADMIN — HYDRALAZINE HYDROCHLORIDE 25 MG: 25 TABLET ORAL at 14:45

## 2018-03-12 RX ADMIN — OMEGA-3-ACID ETHYL ESTERS 2 G: 1 CAPSULE, LIQUID FILLED ORAL at 21:57

## 2018-03-12 RX ADMIN — ASPIRIN 325 MG: 325 TABLET, DELAYED RELEASE ORAL at 10:44

## 2018-03-12 RX ADMIN — INSULIN GLARGINE 50 UNITS: 100 INJECTION, SOLUTION SUBCUTANEOUS at 14:34

## 2018-03-12 RX ADMIN — AMLODIPINE BESYLATE 5 MG: 5 TABLET ORAL at 10:44

## 2018-03-12 RX ADMIN — DOLUTEGRAVIR SODIUM 50 MG: 50 TABLET, FILM COATED ORAL at 21:57

## 2018-03-12 RX ADMIN — ABACAVIR 600 MG: 300 TABLET, FILM COATED ORAL at 20:01

## 2018-03-12 RX ADMIN — MIDAZOLAM HYDROCHLORIDE 0.5 MG: 1 INJECTION, SOLUTION INTRAMUSCULAR; INTRAVENOUS at 13:02

## 2018-03-12 RX ADMIN — DAPSONE 100 MG: 100 TABLET ORAL at 21:57

## 2018-03-12 RX ADMIN — IRBESARTAN 300 MG: 75 TABLET ORAL at 21:57

## 2018-03-12 RX ADMIN — RITONAVIR 100 MG: 100 TABLET, FILM COATED ORAL at 22:00

## 2018-03-12 RX ADMIN — GABAPENTIN 300 MG: 300 CAPSULE ORAL at 10:44

## 2018-03-12 RX ADMIN — Medication 1 CAPSULE: at 10:44

## 2018-03-12 RX ADMIN — CARVEDILOL 12.5 MG: 12.5 TABLET, FILM COATED ORAL at 16:57

## 2018-03-12 RX ADMIN — CARVEDILOL 12.5 MG: 12.5 TABLET, FILM COATED ORAL at 10:44

## 2018-03-12 RX ADMIN — HYDRALAZINE HYDROCHLORIDE 25 MG: 25 TABLET ORAL at 05:43

## 2018-03-12 ASSESSMENT — ACTIVITIES OF DAILY LIVING (ADL)
ADLS_ACUITY_SCORE: 12
ADLS_ACUITY_SCORE: 11
ADLS_ACUITY_SCORE: 12
ADLS_ACUITY_SCORE: 11
ADLS_ACUITY_SCORE: 12

## 2018-03-12 NOTE — PROGRESS NOTES
CM: Received phone call from Karen Lorenzo CM. [phone]  Pt has PCA services 7 days per week for 7.5 hours through [facility]. PT's daughter is PCA through agency  Pt has life line and attend dialysis Tu-TH- Sat  I/P: SWS not consult but will update CC . Will follow should needs arise

## 2018-03-12 NOTE — PLAN OF CARE
Problem: Patient Care Overview  Goal: Plan of Care/Patient Progress Review  Outcome: Improving  Weight up from dry weight. Plan HD tomorrow to take off more fluid. Had coronary angio without intervention. Groin site soft without  swelling or bruising. Pedal pulse palpable with baseline numbness. B/P stable. Bedrest until 1600.

## 2018-03-12 NOTE — PLAN OF CARE
Problem: Patient Care Overview  Goal: Plan of Care/Patient Progress Review  To cath lab. Heparin and nitro gtt continued.

## 2018-03-12 NOTE — PROCEDURES
LVEDP 40mmHg   EF 25-30%  No change in anatomy from 10/17    Med Rx for cardiomyopathy  Excellent dialysis  Low salt diet  Daily exercise.  Wt loss.

## 2018-03-12 NOTE — PLAN OF CARE
Problem: Patient Care Overview  Goal: Plan of Care/Patient Progress Review  Paged Dr Fernández About Continuous fluids not being needed to run with Hep gtt or nitro gtt per pharmacy. Dr Fernández called to verify ok to DC continuous IVF.

## 2018-03-12 NOTE — PLAN OF CARE
"Problem: Patient Care Overview  Goal: Plan of Care/Patient Progress Review  Outcome: No Change  Orientation: Alert and oriented x4. Awake, alert, cooperative, no apparent distress.     VSS : /75 (BP Location: Right leg)  Temp 95.6  F (35.3  C) (Axillary)  Resp 16  Ht 1.803 m (5' 11\")  Wt 90.2 kg (198 lb 12.8 oz)  SpO2 95%  BMI 27.73 kg/m2.  BP's elevated after dialysis, Avapro added and labetalol given.  Pt requiring O2 NC 1 LPM for sleep, Sats in the 80's, may need to consider Cpap for home   IVF: Hep gtt infusing 16.0, Nitro concentration changed and infusing at half the rate 27.5 ml/hr  Tele: SR. HR 70s. Regular rate and rhythm, no murmur noted  LS: clear and equal bilaterally. Clear to auscultation bilaterally but diminished throughout, No wheezing  GI: Passing gas. No BM. Denies N/V.  Normal bowel sounds, soft, non-distended, non-tender, good appetite, BS low after dialysis 68, pt given supper and recheck was 164.   : Adequate urine output. Clear yellow. On hemodialysis   Skin: bruising noted, Skin otherwise intact. No rashes, no cyanosis, dry to touch  Activity: SBA to assist of 1. Pt slept comfortably throughout shift.   Pain:No c/o pain. No PRN interventions utilized. Pt sleeping.   DC Plan: Continue with current cares.         "

## 2018-03-12 NOTE — PROGRESS NOTES
Cardiology Progress Note  DEDRICK Gary    Primary cardiologist:  Dr. Diaz          Assessment and Plan:   Admit (3/8) sudden onset of nocturnal SOB  Required BIPAP for O2 support.  Evidence of CHF exacerbation, HTN, anemia. Recurrent resting CP concerning for ACS. Known significant hx of CAD and multiple PCI  Recent concern for significant anemia requiring PRBC.    PMH:  Multiple NSTEMI w/ diag & LAD stenting, repeat LAD/Diag stenting due to in-stent restenosis (8/16), anemia, ESRD on dialysis, HTN, CHF, HIV , mixed hyperlipidemia, DM      ACS:  -troponin peak 0.07/no significant ST changes  -multiple NSTEMI and stenting to LAD & Diag, last done (8/2016)    -cath (10/17): patent Diag and mid/distal LAD stent.  70% ostial & distal Diag dx (FFR -)  -recurrent CP since admit, but none for past 2 days  -significant anemia/HTN complicating picture  -Plan for coronary angiogram today  -bblocker/ARB/ASA/amlodipine/Imdur/statin/heparin/NTG drip    Diastolic HF exacerbation  -BNP 39,600/pulmonary vascular congestion  - ECHO: (1/2018) LVEF 55%, grade II LVD  -significant anemia contributing  -improved symptoms w/ HD  -(weight up 5lbs from admit)  -ARB/hydralazine/Imdur    HTN  -on multiple medications  -bblocker added/losartan changed to Irbesartan/Hydralazine increased since admit  -BP still elevated at times.  Consider increasing Coreg    ESRD:  -HD 3x/wk--> done yest.  May need repeat dialysis today    Chronic anemia  -recurrent admissions for significant anemia  -hgb 7.1 on admit--> s/p multiple PRBC--> 9.1 today  -etiology unclear.  No bleeding issues identified  -keep hgb >9    Hypertriglyceridemia  -recent 311/LDL 30  -on statin    DM               Interval History:   Still on NTG drip  No CP this am  EKG w/o significant ST changes  Mild SOB                Medications:       carvedilol  12.5 mg Oral BID w/meals     aspirin EC  325 mg Oral Daily     irbesartan  300 mg Oral At Bedtime     - MEDICATION INSTRUCTIONS  "for Dialysis Patients -   Does not apply See Admin Instructions     hydrALAZINE (APRESOLINE) tablet 25 mg  25 mg Oral Q8H JAN     sodium chloride (PF)  3 mL Intracatheter Q8H     abacavir  600 mg Oral QPM     amLODIPine (NORVASC) tablet 5 mg  5 mg Oral Daily     atorvastatin  40 mg Oral At Bedtime     clopidogrel (PLAVIX) tablet 75 mg  75 mg Oral QPM     darunavir  800 mg Oral At Bedtime     dolutegravir  50 mg Oral At Bedtime     gabapentin  300 mg Oral QAM     gabapentin  600 mg Oral QPM     insulin glargine  50 Units Subcutaneous Daily     isosorbide mononitrate  120 mg Oral QPM     magnesium oxide (MAG-OX) tablet 400 mg  400 mg Oral At Bedtime     NEPRO   Oral BID     omega-3 acid ethyl esters  2 g Oral BID     omeprazole  40 mg Oral Daily     ritonavir  100 mg Oral At Bedtime     dapsone  100 mg Oral At Bedtime     insulin aspart  10 Units Subcutaneous TID w/meals     insulin aspart  1-7 Units Subcutaneous TID AC     insulin aspart  1-5 Units Subcutaneous At Bedtime     NEPHROCAPS  1 capsule Oral Daily            Physical Exam:   Blood pressure 159/82, temperature 96  F (35.6  C), temperature source Oral, resp. rate 18, height 1.803 m (5' 11\"), weight 90.2 kg (198 lb 12.8 oz), SpO2 93 %.  Wt Readings from Last 3 Encounters:   03/11/18 90.2 kg (198 lb 12.8 oz)   02/01/18 87.8 kg (193 lb 9.6 oz)   02/01/18 90.3 kg (199 lb 1.2 oz)     I/O last 3 completed shifts:  In: 1680 [P.O.:1150]  Out: 4100 [Urine:100; Other:4000]    CONST:  Alert and oriented  NAD  LUNGS:  Crackles in bases bilat  CARDIO:  RRR, s1, S2  II/VI blowing systolic murmur  ABD:  Soft  +BS  EXT:  No edema  Diminished pedal pulses bilat           Data:   TELE:  SR    CBC    Recent Labs  Lab 03/11/18  1243 03/10/18  0730 03/09/18  0709   WBC  --  5.8 6.1   HGB 7.7* 7.1* 7.5*   PLT  --  160 174       BMP    Recent Labs  Lab 03/11/18  1243 03/10/18  0730 03/09/18  0709 03/09/18  0023    139 136 137   POTASSIUM 4.0 4.9 4.3 4.2   CHLORIDE 99 104 101 " 100   PRABHA 8.9 8.8 8.8 8.8   CO2 27 26 30 29   BUN 37* 57* 44* 36*   CR 5.14* 7.55* 5.56* 4.83*   * 133* 138* 131*     Recent Labs   Lab Test  08/09/17   0818  06/05/17   1138   07/23/12   0712   CHOL  125  120   < >  298*   HDL  33*  33*   < >  37*   LDL  30  12   < >  Cannot estimate LDL when triglyceride exceeds 400 mg/dL   TRIG  311*  373*   < >  1541*   CHOLHDLRATIO   --    --    --   8.1*    < > = values in this interval not displayed.       TROP  Lab Results   Component Value Date    TROPI 0.065 (H) 03/09/2018    TROPI 0.074 (H) 03/09/2018    TROPI 0.058 (H) 03/09/2018    TROPI 0.052 (H) 03/08/2018    TROPI 0.045 03/08/2018    TROPONIN 0.02 03/08/2018    TROPONIN 0.02 12/25/2017    TROPONIN 0.02 04/15/2017    TROPONIN 0.02 05/19/2016       BNP    Recent Labs  Lab 03/08/18  0542   NTBNPI [id number]*

## 2018-03-12 NOTE — PROGRESS NOTES
Cath results noted.  LVEDP of 40 (though the note also says 14?)  We plan HD tomorrow.  We will have to pull his weight down many more KG if able.      Rudolph Wilkes MD

## 2018-03-12 NOTE — PROGRESS NOTES
[facility]  Hospitalist Progress Note  Glenna Fernández MD 03/12/2018    Reason for Stay (Diagnosis): ACS with elevated troponin         Assessment and Plan:      Summary of Stay: Donald Raza is a 68 year old male with a history of CAD with prior stenting to the LAD, preserved EF  At 55-60 % by echo in 12/2017, ESRD on HD, T2dm, HIV on therapy, htn/hlp admitted on 3/8/2018 with CP and SOB with e/o hypoxic respiratory failure with sats 83 % requiring short term BiPAP rescue therapy, and persistent CP necessitating NTG gtt. He had mildly elevated trops.    Cardiology is on board and assisting with management, of note most recent angio completed 10/2017 showing no lesions that would require intervention and plan was for medical management but given persistence of symptoms underwent angio am 3/12/18 showing no change so no intervention required    His brief hospitalization is notable for difficult to control hypertension, and ongoing troubles with anemia of unclear etiology necessitating 2 U pRBC transfusion   Problem List:   1. ACS/NSTEMI:  With mildly elevated troponin in setting of ESRD-peaked at 0.074 .  Per initial cardiology consult-suspect CP and mildly elevated trop due to uncontrolled hypertension and chronic anemia please see change in meds as outlined in #3-and recs for hgb > 8.0.  Underwent repeat angio without change from previous.  2. Hypoxic respiratory failure-due to diastolic HF with exacerbation in the setting of uncontrolled hypertension.  Per renal no e/o significant volume overload as at baseline dry weight 87 kg on presentation.  On frequent HD while in hospital, down 5 L, but weight up.  Curious.  Plan for additional HD tomorrow (usual schedule)  3. Htn:  Uncontrolled with BPs ranging from 150-210's.  pta medications were  mg, amlodipine 5 mg, hydralazine 25 mg bid, losartan 50 mg-currently on NTG gtt, amlodipine 5 mg, hydralazine 25 mg bid, and losartan switched to irbesartan  150 mg.  BPs still uncontrolled, have added in prn labetalol, not clear why he's not on a BB at baseline except ? Bradycardia with prior.  He is not having decent response to med dose metop, so will switch over to carvedilol 12.5 mg bid for better BP management, less likely chance for bradycardia.   4. Anemia; chronic-baseline hgb seems to be 8-9 range, but has had variable hgbs over the past year sometimes dipping into the 5 range.  Had screening EGD and colonoscopy in 2/17 and 5/17 respectively negative for bleeding. Denies any bleeding.  Last epo that I can find is in 2011 and measured at 16 (normal 4-27)  Per cards should try to keep > 8.0-no active bleeding identified, ? Just due to HD and destruction vs BM suppression from medications.  Is on IV iron with HD rec'd  1 U prbc on admission for hgb 7.1- with good response to 9.2, but then 7.1 again within 48 hours.  So transfused additional unit on HD 3/10/18 for a total of 2 U.  Response only to upper 7 range and so transfused 2 u while on HD pre angion 3/11/18 and now hgb in low 9 range.  EPO level back and wnl. I think he needs heme eval and probable bm bx in outpatient setting.   5. T2dm:  Home regimen is glargine 50 U sq qd, lispro 15 u tid with meals, as well as additional lispro per SS-currently back on glargine at 50 u and lispro 10 u tid with ISS and fair control.   6. HIV:  Cont home regimen of antivirals  7. Underlying CAD:  Cont clopidogrel/statin, and attempt better BP control as outlined above  8. HLP:  Cont with atorva 40 mg  9. Anxiety:  Cont with prn clonazepam as at home  10. ESRD:  Appreciate nephrology input-ESRD typically Tuesday/thursday/saturday  DVT Prophylaxis: currently on heparin gtt  Code Status: Full Code  # Pain Assessment:   Current Pain Score 3/12/2018 3/12/2018 3/12/2018   Patient currently in pain? denies denies denies   Pain score (0-10) - - -   Pain location - - -   Pain descriptors - - -   CPOT pain score - - -   - Donald is  "experiencing pain due to ankle pain. Pain management was discussed and the plan was created in a collaborative fashion.  Donald's response to the current recommendations: engaged  - Pharmacologic adjuvants: Acetaminophen        Discharge Dispo: home  Estimated Disch Date / # of Days until Disch:hopefully 1-2 days, will try to get up and ambulate in preparation for returning home        Interval History (Subjective):      Denies any bleeding tendencies, no black tarry stools, no bruising, no epistaxis.  Reports egd and colonoscopy last year to try and identify source and eval negative-I was able to review those results in care everywhere.      Feeling pretty good post cath, no more cp, denies any sob no n/v/d                  Physical Exam:      Last Vital Signs:  /76 (BP Location: Right arm)  Temp 96  F (35.6  C) (Oral)  Resp 18  Ht 1.803 m (5' 11\")  Wt 90.2 kg (198 lb 12.8 oz)  SpO2 97%  BMI 27.73 kg/m2    I/O:  Net - 5000 cc  Weight up 2.8 kg as on yesterday, not documented today   Tele:  NSR    Pleasant nad looks stated age, appears fatigued.  Head nc/at sclera normal pigmentation. RRR  3/6 harsh lizzette 1 +  le edema no r/g abd s/nt/nd alert and oriented affect appropriate evangelista looks like he feels poorer today and seems more sob but no accessory muscle use lungs diminished in the bases           Medications:      All current medications were reviewed with changes reflected in problem list.         Data:      All new lab and imaging data was reviewed.   Labs:    Recent Labs  Lab 03/12/18  0748      POTASSIUM 3.8   CHLORIDE 97   CO2 30   ANIONGAP 7   *   BUN 32*   CR 4.73*   GFRESTIMATED 12*   GFRESTBLACK 15*   PRABHA 9.3       Recent Labs  Lab 03/12/18  0748   WBC 6.1   HGB 9.1*   HCT 29.3*   MCV 94         Imaging:   "

## 2018-03-13 LAB
ALBUMIN SERPL-MCNC: 3.3 G/DL (ref 3.4–5)
ANION GAP SERPL CALCULATED.3IONS-SCNC: 9 MMOL/L (ref 3–14)
BASOPHILS # BLD AUTO: 0.1 10E9/L (ref 0–0.2)
BASOPHILS NFR BLD AUTO: 1 %
BUN SERPL-MCNC: 46 MG/DL (ref 7–30)
CALCIUM SERPL-MCNC: 9.2 MG/DL (ref 8.5–10.1)
CHLORIDE SERPL-SCNC: 94 MMOL/L (ref 94–109)
CO2 SERPL-SCNC: 27 MMOL/L (ref 20–32)
CREAT SERPL-MCNC: 6.17 MG/DL (ref 0.66–1.25)
DIFFERENTIAL METHOD BLD: ABNORMAL
EOSINOPHIL # BLD AUTO: 0.1 10E9/L (ref 0–0.7)
EOSINOPHIL NFR BLD AUTO: 1.1 %
ERYTHROCYTE [DISTWIDTH] IN BLOOD BY AUTOMATED COUNT: 15.8 % (ref 10–15)
GFR SERPL CREATININE-BSD FRML MDRD: 9 ML/MIN/1.7M2
GLUCOSE BLDC GLUCOMTR-MCNC: 104 MG/DL (ref 70–99)
GLUCOSE BLDC GLUCOMTR-MCNC: 110 MG/DL (ref 70–99)
GLUCOSE BLDC GLUCOMTR-MCNC: 118 MG/DL (ref 70–99)
GLUCOSE BLDC GLUCOMTR-MCNC: 119 MG/DL (ref 70–99)
GLUCOSE BLDC GLUCOMTR-MCNC: 129 MG/DL (ref 70–99)
GLUCOSE BLDC GLUCOMTR-MCNC: 70 MG/DL (ref 70–99)
GLUCOSE SERPL-MCNC: 75 MG/DL (ref 70–99)
HCT VFR BLD AUTO: 31.3 % (ref 40–53)
HGB BLD-MCNC: 9.7 G/DL (ref 13.3–17.7)
IMM GRANULOCYTES # BLD: 0.1 10E9/L (ref 0–0.4)
IMM GRANULOCYTES NFR BLD: 1 %
INTERPRETATION ECG - MUSE: NORMAL
LYMPHOCYTES # BLD AUTO: 1 10E9/L (ref 0.8–5.3)
LYMPHOCYTES NFR BLD AUTO: 15.5 %
MCH RBC QN AUTO: 29.6 PG (ref 26.5–33)
MCHC RBC AUTO-ENTMCNC: 31 G/DL (ref 31.5–36.5)
MCV RBC AUTO: 95 FL (ref 78–100)
MONOCYTES # BLD AUTO: 0.6 10E9/L (ref 0–1.3)
MONOCYTES NFR BLD AUTO: 9.3 %
NEUTROPHILS # BLD AUTO: 4.5 10E9/L (ref 1.6–8.3)
NEUTROPHILS NFR BLD AUTO: 72.1 %
NRBC # BLD AUTO: 0 10*3/UL
NRBC BLD AUTO-RTO: 0 /100
PHOSPHATE SERPL-MCNC: 5.9 MG/DL (ref 2.5–4.5)
PLATELET # BLD AUTO: 160 10E9/L (ref 150–450)
POTASSIUM SERPL-SCNC: 4.8 MMOL/L (ref 3.4–5.3)
RBC # BLD AUTO: 3.28 10E12/L (ref 4.4–5.9)
SODIUM SERPL-SCNC: 130 MMOL/L (ref 133–144)
WBC # BLD AUTO: 6.3 10E9/L (ref 4–11)

## 2018-03-13 PROCEDURE — 25000132 ZZH RX MED GY IP 250 OP 250 PS 637: Mod: GY | Performed by: NURSE PRACTITIONER

## 2018-03-13 PROCEDURE — 25000132 ZZH RX MED GY IP 250 OP 250 PS 637: Mod: GY | Performed by: PHYSICIAN ASSISTANT

## 2018-03-13 PROCEDURE — 25000131 ZZH RX MED GY IP 250 OP 636 PS 637: Mod: GY | Performed by: PHYSICIAN ASSISTANT

## 2018-03-13 PROCEDURE — 25000132 ZZH RX MED GY IP 250 OP 250 PS 637: Mod: GY | Performed by: INTERNAL MEDICINE

## 2018-03-13 PROCEDURE — 25000128 H RX IP 250 OP 636: Performed by: INTERNAL MEDICINE

## 2018-03-13 PROCEDURE — 85025 COMPLETE CBC W/AUTO DIFF WBC: CPT | Performed by: INTERNAL MEDICINE

## 2018-03-13 PROCEDURE — 00000146 ZZHCL STATISTIC GLUCOSE BY METER IP

## 2018-03-13 PROCEDURE — 99233 SBSQ HOSP IP/OBS HIGH 50: CPT | Performed by: INTERNAL MEDICINE

## 2018-03-13 PROCEDURE — A9270 NON-COVERED ITEM OR SERVICE: HCPCS | Mod: GY | Performed by: INTERNAL MEDICINE

## 2018-03-13 PROCEDURE — 12000007 ZZH R&B INTERMEDIATE

## 2018-03-13 PROCEDURE — A9270 NON-COVERED ITEM OR SERVICE: HCPCS | Mod: GY | Performed by: PHYSICIAN ASSISTANT

## 2018-03-13 PROCEDURE — A9270 NON-COVERED ITEM OR SERVICE: HCPCS | Mod: GY | Performed by: NURSE PRACTITIONER

## 2018-03-13 PROCEDURE — 80069 RENAL FUNCTION PANEL: CPT | Performed by: INTERNAL MEDICINE

## 2018-03-13 PROCEDURE — 63400005 ZZH RX 634: Performed by: INTERNAL MEDICINE

## 2018-03-13 PROCEDURE — 36415 COLL VENOUS BLD VENIPUNCTURE: CPT | Performed by: INTERNAL MEDICINE

## 2018-03-13 PROCEDURE — 90937 HEMODIALYSIS REPEATED EVAL: CPT

## 2018-03-13 PROCEDURE — 99232 SBSQ HOSP IP/OBS MODERATE 35: CPT | Mod: 24 | Performed by: INTERNAL MEDICINE

## 2018-03-13 RX ORDER — AMLODIPINE BESYLATE 2.5 MG/1
2.5 TABLET ORAL ONCE
Status: DISCONTINUED | OUTPATIENT
Start: 2018-03-13 | End: 2018-03-13

## 2018-03-13 RX ORDER — DOXERCALCIFEROL 4 UG/2ML
6 INJECTION INTRAVENOUS
Status: COMPLETED | OUTPATIENT
Start: 2018-03-13 | End: 2018-03-13

## 2018-03-13 RX ORDER — HEPARIN SODIUM 1000 [USP'U]/ML
1000 INJECTION, SOLUTION INTRAVENOUS; SUBCUTANEOUS CONTINUOUS
Status: DISCONTINUED | OUTPATIENT
Start: 2018-03-13 | End: 2018-03-13

## 2018-03-13 RX ORDER — HEPARIN SODIUM 1000 [USP'U]/ML
1000 INJECTION, SOLUTION INTRAVENOUS; SUBCUTANEOUS
Status: COMPLETED | OUTPATIENT
Start: 2018-03-13 | End: 2018-03-13

## 2018-03-13 RX ADMIN — RITONAVIR 100 MG: 100 TABLET, FILM COATED ORAL at 22:11

## 2018-03-13 RX ADMIN — HYDRALAZINE HYDROCHLORIDE 25 MG: 25 TABLET ORAL at 05:56

## 2018-03-13 RX ADMIN — GABAPENTIN 300 MG: 300 CAPSULE ORAL at 14:23

## 2018-03-13 RX ADMIN — OMEGA-3-ACID ETHYL ESTERS 2 G: 1 CAPSULE, LIQUID FILLED ORAL at 22:09

## 2018-03-13 RX ADMIN — IRBESARTAN 300 MG: 75 TABLET ORAL at 22:10

## 2018-03-13 RX ADMIN — INSULIN ASPART 10 UNITS: 100 INJECTION, SOLUTION INTRAVENOUS; SUBCUTANEOUS at 14:28

## 2018-03-13 RX ADMIN — EPOETIN ALFA 11000 UNITS: 3000 SOLUTION INTRAVENOUS; SUBCUTANEOUS at 11:53

## 2018-03-13 RX ADMIN — MAGNESIUM OXIDE TAB 400 MG (241.3 MG ELEMENTAL MG) 400 MG: 400 (241.3 MG) TAB at 22:10

## 2018-03-13 RX ADMIN — ISOSORBIDE MONONITRATE 120 MG: 60 TABLET, EXTENDED RELEASE ORAL at 22:09

## 2018-03-13 RX ADMIN — INSULIN ASPART 10 UNITS: 100 INJECTION, SOLUTION INTRAVENOUS; SUBCUTANEOUS at 18:23

## 2018-03-13 RX ADMIN — AMLODIPINE BESYLATE 7.5 MG: 2.5 TABLET ORAL at 14:21

## 2018-03-13 RX ADMIN — ATORVASTATIN CALCIUM 40 MG: 40 TABLET, FILM COATED ORAL at 22:09

## 2018-03-13 RX ADMIN — DARUNAVIR 800 MG: 800 TABLET, FILM COATED ORAL at 22:10

## 2018-03-13 RX ADMIN — DAPSONE 100 MG: 100 TABLET ORAL at 22:09

## 2018-03-13 RX ADMIN — Medication: at 22:16

## 2018-03-13 RX ADMIN — DOXERCALCIFEROL 6 MCG: 4 INJECTION, SOLUTION INTRAVENOUS at 09:19

## 2018-03-13 RX ADMIN — SODIUM CHLORIDE 250 ML: 9 INJECTION, SOLUTION INTRAVENOUS at 08:57

## 2018-03-13 RX ADMIN — HEPARIN SODIUM 1000 UNITS: 1000 INJECTION, SOLUTION INTRAVENOUS; SUBCUTANEOUS at 08:59

## 2018-03-13 RX ADMIN — HYDRALAZINE HYDROCHLORIDE 25 MG: 25 TABLET ORAL at 14:22

## 2018-03-13 RX ADMIN — Medication 1 CAPSULE: at 14:22

## 2018-03-13 RX ADMIN — CARVEDILOL 12.5 MG: 12.5 TABLET, FILM COATED ORAL at 14:22

## 2018-03-13 RX ADMIN — HEPARIN SODIUM 1000 UNITS/HR: 1000 INJECTION, SOLUTION INTRAVENOUS; SUBCUTANEOUS at 09:00

## 2018-03-13 RX ADMIN — OMEGA-3-ACID ETHYL ESTERS 2 G: 1 CAPSULE, LIQUID FILLED ORAL at 14:23

## 2018-03-13 RX ADMIN — INSULIN ASPART 10 UNITS: 100 INJECTION, SOLUTION INTRAVENOUS; SUBCUTANEOUS at 07:23

## 2018-03-13 RX ADMIN — HYDRALAZINE HYDROCHLORIDE 25 MG: 25 TABLET ORAL at 22:10

## 2018-03-13 RX ADMIN — ASPIRIN 81 MG: 81 TABLET, COATED ORAL at 14:24

## 2018-03-13 RX ADMIN — CLOPIDOGREL 75 MG: 75 TABLET, FILM COATED ORAL at 22:09

## 2018-03-13 RX ADMIN — INSULIN GLARGINE 50 UNITS: 100 INJECTION, SOLUTION SUBCUTANEOUS at 14:21

## 2018-03-13 RX ADMIN — ABACAVIR 600 MG: 300 TABLET, FILM COATED ORAL at 22:10

## 2018-03-13 RX ADMIN — DOLUTEGRAVIR SODIUM 50 MG: 50 TABLET, FILM COATED ORAL at 22:10

## 2018-03-13 RX ADMIN — OMEPRAZOLE 40 MG: 20 CAPSULE, DELAYED RELEASE ORAL at 17:51

## 2018-03-13 RX ADMIN — GABAPENTIN 600 MG: 300 CAPSULE ORAL at 22:10

## 2018-03-13 RX ADMIN — ACETAMINOPHEN 650 MG: 325 TABLET, FILM COATED ORAL at 00:00

## 2018-03-13 ASSESSMENT — ACTIVITIES OF DAILY LIVING (ADL)
ADLS_ACUITY_SCORE: 11
ADLS_ACUITY_SCORE: 12

## 2018-03-13 NOTE — PLAN OF CARE
"Problem: Patient Care Overview  Goal: Plan of Care/Patient Progress Review  Outcome: Improving  Orientation: Alert and oriented x4. Awake, alert, cooperative, no apparent distress.    VSS: /67 (BP Location: Right arm)  Temp 96.8  F (36  C) (Oral)  Resp 20  Ht 1.803 m (5' 11\")  Wt 90.2 kg (198 lb 12.8 oz)  SpO2 96%  BMI 27.73 kg/m2.   IVF: SL  Tele: SR. HR 60s. Regular rate and rhythm, no murmur noted  LS: clear and equal bilaterally. Clear to auscultation bilaterally but diminished throughout.  No wheezing  GI: Passing gas. Denies N/V.  Normal bowel sounds, soft, non-distended, non-tender  : Adequate urine output. Clear yellow.   Skin: bruising noted, Skin otherwise intact. No rashes, no cyanosis, dry to touch.  Angio insertion site Right groin WDL/CDI  Activity: SBA to assist of 1. Pt slept comfortably throughout shift.   Pain: 7/10 HA pain. Tylenol given. Pt sleeping.   DC Plan: Continue with current cares.     8534 - BS check 70, Pt states he \"does not like himself\", [place] and wendy crackers given.   7665 - BS recheck 104  "

## 2018-03-13 NOTE — PROGRESS NOTES
Inpatient Dialysis Progress Note            Assessment and Plan:   1.  ESKD.  Runs TTS at [facility] under Dr. Rocio SHEPHERDNANCY.  TW is 87 kg.      2. Recurrent CHF/Volume overload.  Monthly admissions since August (8 in all).  This must at least in part be related to LVEDP of 40 mmHg.  Though he is close to his outpt target weight, I think his true target weight is much lower.  We have set a goal of 4 kg for his run today.  I will plan on an extra run tomorrow to attempt to pull his weight down further.      2.  Anemia. HGB 9.7 after receiving 3 units RBCs.  His HGB should rise as we pull more fluid off him.  He is on EPO 11K units.    3.  HTN. BP is borderline controlled.  He is on amlodipine 7.5 mg daily, carvedilol 12.5 mg BID, irbesartan 300 mg daily, hydralazine 25 mg Q8H, Imdur 120 mg QPM.  I expect that his BP will be better controlled after we have pulled his weight down.    4.  FEN. K 4.8. On K2 bath.  Phos 5.9.      Plan:    HD again tomorrow to pull more fluid.          Interval History:   Donald feels OK.  No CP or SOB.  He did move his arm which dislodged one needle.  Blood loss was 100-200 mL.   Eating ok.  Denies n/v/f/c.  No dizziness/lightheadedness/cramping.  No abd pain/cp/sob.      Dialysis Parameters:     Wt Readings from Last 4 Encounters:   03/13/18 87.4 kg (192 lb 10.9 oz)   02/01/18 87.8 kg (193 lb 9.6 oz)   02/01/18 90.3 kg (199 lb 1.2 oz)   01/29/18 91.3 kg (201 lb 4.8 oz)     I/O last 3 completed shifts:  In: 1287 [P.O.:1090; I.V.:197]  Out: 50 [Urine:50]  BP Readings from Last 3 Encounters:   03/13/18 154/79   02/04/18 132/68   02/01/18 149/71       Routine, ONE TIME, Starting today For 1 Occurrences  Weight Loss (kg): 4  Dialysis Temp: 36.5  C  Access Device: AVF  Access Site: :L arm  Dialyzer: Revaclear  Dialysis Bath: K 2  Blood Flow Rate (mL/min): 400  Total Treatment Time (hrs): 4  Heparin: standard         Medications and Allergies:   Reviewed in EPIC      sodium chloride 0.9%  250 mL  Intravenous Once in dialysis     epoetin brittni (EPOGEN,PROCRIT) inj ESRD  11,000 Units Intravenous Once in dialysis     amLODIPine (NORVASC) tablet 7.5 mg  7.5 mg Oral Daily     carvedilol  12.5 mg Oral BID w/meals     iopamidol  100 mL INTRA-ARTERIAL Once     iopamidol  100 mL INTRA-ARTERIAL Once     sodium chloride (PF)  3 mL Intracatheter Q8H     aspirin EC  81 mg Oral Daily     irbesartan  300 mg Oral At Bedtime     - MEDICATION INSTRUCTIONS for Dialysis Patients -   Does not apply See Admin Instructions     hydrALAZINE (APRESOLINE) tablet 25 mg  25 mg Oral Q8H JAN     sodium chloride (PF)  3 mL Intracatheter Q8H     abacavir  600 mg Oral QPM     atorvastatin  40 mg Oral At Bedtime     clopidogrel (PLAVIX) tablet 75 mg  75 mg Oral QPM     darunavir  800 mg Oral At Bedtime     dolutegravir  50 mg Oral At Bedtime     gabapentin  300 mg Oral QAM     gabapentin  600 mg Oral QPM     insulin glargine  50 Units Subcutaneous Daily     isosorbide mononitrate  120 mg Oral QPM     magnesium oxide (MAG-OX) tablet 400 mg  400 mg Oral At Bedtime     NEPRO   Oral BID     omega-3 acid ethyl esters  2 g Oral BID     omeprazole  40 mg Oral Daily     ritonavir  100 mg Oral At Bedtime     dapsone  100 mg Oral At Bedtime     insulin aspart  10 Units Subcutaneous TID w/meals     insulin aspart  1-7 Units Subcutaneous TID AC     insulin aspart  1-5 Units Subcutaneous At Bedtime     NEPHROCAPS  1 capsule Oral Daily     sodium chloride 0.9%, lidocaine (buffered or not buffered), lidocaine (buffered or not buffered), - MEDICATION INSTRUCTIONS -, lidocaine (buffered or not buffered), lidocaine 4%, sodium chloride (PF), HOLD MEDICATION, HYDROcodone-acetaminophen, labetalol, - MEDICATION INSTRUCTIONS -, hypromellose-dextran, - MEDICATION INSTRUCTIONS -, lidocaine (buffered or not buffered), lidocaine 4%, sodium chloride (PF), nitroGLYcerin, clonazePAM (klonoPIN) tablet 0.5 mg, ipratropium - albuterol 0.5 mg/2.5 mg/3 mL, glucose **OR**  dextrose **OR** glucagon, naloxone, melatonin, acetaminophen, senna-docusate **OR** senna-docusate, polyethylene glycol, ondansetron **OR** ondansetron, Continuing ACE inhibitor/ARB/ARNI from home medication list OR ACE inhibitor/ARB order already placed during this visit, - MEDICATION INSTRUCTIONS -     Allergies   Allergen Reactions     Calcium Acetate Other (See Comments)     Other reaction(s): Other, see comments  Pain in chest and back  Pain in chest area (sensitive skin)      Diagnostic X-Ray Materials Other (See Comments)     PN: renal failure     Lisinopril      Sulfa Drugs               Labs:     BMP  Recent Labs  Lab 03/13/18  0609 03/12/18  0748 03/11/18  1243 03/10/18  0730   * 134 135 139   POTASSIUM 4.8 3.8 4.0 4.9   CHLORIDE 94 97 99 104   PRABHA 9.2 9.3 8.9 8.8   CO2 27 30 27 26   BUN 46* 32* 37* 57*   CR 6.17* 4.73* 5.14* 7.55*   GLC 75 124* 134* 133*     CBC  Recent Labs  Lab 03/13/18  0609 03/12/18  0748 03/11/18  1243 03/10/18  0730 03/09/18  0709   WBC 6.3 6.1  --  5.8 6.1   HGB 9.7* 9.1* 7.7* 7.1* 7.5*   HCT 31.3* 29.3*  --  23.3* 25.1*   MCV 95 94  --  94 97    155  --  160 174     Lab Results   Component Value Date    AST 17 03/10/2018    ALT 17 03/10/2018    ALKPHOS 89 03/10/2018    BILITOTAL 0.4 03/10/2018    BILICONJ 0.0 07/06/2014            Physical Exam:   Vitals were reviewed in EPIC    Wt Readings from Last 3 Encounters:   03/13/18 87.4 kg (192 lb 10.9 oz)   02/01/18 87.8 kg (193 lb 9.6 oz)   02/01/18 90.3 kg (199 lb 1.2 oz)       Intake/Output Summary (Last 24 hours) at 03/13/18 1139  Last data filed at 03/13/18 0811   Gross per 24 hour   Intake             1280 ml   Output                0 ml   Net             1280 ml       GENERAL APPEARANCE: pleasant, no distress, a & o  HEENT:  Eyes/ears/nose grossly normal, neck supple  RESP: few crackles bases  CV: regular rate and rhythm, normal S1 S2, soft systole M   ABDOMEN: soft, nontender, bowel sounds normal  EXTREMITIES/SKIN:  no edema, no rashes or lesions     Pt seen on dialysis.  Stable run.  Good BFR.      Attestation:  I have reviewed today's vital signs, notes, medications, labs and imaging.     Rudolph Wilkes MD  [facility] - Nephrology  [phone]

## 2018-03-13 NOTE — PLAN OF CARE
Problem: Patient Care Overview  Goal: Plan of Care/Patient Progress Review  Outcome: Improving  B/P improved. States he has had some dizzyness at times. To dialysis. Right groin site without swelling or bruising. CMS intact.  Back from dialysis-finishing meal without glucose check. Given insulin with meal dose and omitted the SSI. Check 119. Took off 4L in HD. Left arm fistula dressing clean, dry and intact.

## 2018-03-13 NOTE — PROGRESS NOTES
Cardiology Progress Note  DEDRICK Gary    Primary cardiologist:  Dr. Diaz          Assessment and Plan:   Admit (3/8) sudden onset of nocturnal SOB  Required BIPAP for O2 support.  Evidence of CHF exacerbation, HTN, anemia. Recurrent resting CP concerning for ACS. Known significant hx of CAD and multiple PCI  Recent concern for significant anemia requiring PRBC.    PMH:  Multiple NSTEMI w/ diag & LAD stenting, repeat LAD/Diag stenting due to in-stent restenosis (8/16), anemia, ESRD on dialysis, HTN, CHF, HIV , mixed hyperlipidemia, DM      ACS:  -troponin peak 0.07/no significant ST changes  -multiple NSTEMI and stenting to LAD & Diag, last done (8/2016)    -cath (10/17): patent Diag and mid/distal LAD stent.  70% ostial & distal Diag dx (FFR -)  -s/p cath (3/12): no significant change  Elevated LVEDp  LVEF 30% No PCI  -significant anemia/HTN complicating picture  -bblocker/ARB/ASA/amlodipine/Imdur/statin    Diastolic/systolic HF exacerbation  -BNP 39,600/pulmonary vascular congestion  - ECHO: (1/2018) LVEF 55%, grade II LVD.  30% per LV gram  -significant anemia contributing  -improved symptoms w/ HD  -low salt diet  -(weight up 5lbs from admit)  -ARB/hydralazine/Imdur    HTN  -on multiple medications  -bblocker added/losartan changed to Irbesartan/Hydralazine increased since admit  -BP still elevated at times.  Will increase Amlodipine to 7.5mg    ESRD:  -HD 3x/wk  Dialysis today    Chronic anemia  -recurrent admissions for significant anemia  -hgb 7.1 on admit--> s/p multiple PRBC--> 9.7 today  -etiology unclear.  No bleeding issues identified  -keep hgb >9    Hypertriglyceridemia  -recent 311/LDL 30  -on statin    DM               Interval History:   No CP   Mild SOB                Medications:       sodium chloride 0.9%  250 mL Intravenous Once in dialysis     epoetin brittni (EPOGEN,PROCRIT) inj ESRD  11,000 Units Intravenous Once in dialysis     carvedilol  12.5 mg Oral BID w/meals     iopamidol  100 mL  "INTRA-ARTERIAL Once     iopamidol  100 mL INTRA-ARTERIAL Once     sodium chloride (PF)  3 mL Intracatheter Q8H     aspirin EC  81 mg Oral Daily     irbesartan  300 mg Oral At Bedtime     - MEDICATION INSTRUCTIONS for Dialysis Patients -   Does not apply See Admin Instructions     hydrALAZINE (APRESOLINE) tablet 25 mg  25 mg Oral Q8H JAN     sodium chloride (PF)  3 mL Intracatheter Q8H     abacavir  600 mg Oral QPM     amLODIPine (NORVASC) tablet 5 mg  5 mg Oral Daily     atorvastatin  40 mg Oral At Bedtime     clopidogrel (PLAVIX) tablet 75 mg  75 mg Oral QPM     darunavir  800 mg Oral At Bedtime     dolutegravir  50 mg Oral At Bedtime     gabapentin  300 mg Oral QAM     gabapentin  600 mg Oral QPM     insulin glargine  50 Units Subcutaneous Daily     isosorbide mononitrate  120 mg Oral QPM     magnesium oxide (MAG-OX) tablet 400 mg  400 mg Oral At Bedtime     NEPRO   Oral BID     omega-3 acid ethyl esters  2 g Oral BID     omeprazole  40 mg Oral Daily     ritonavir  100 mg Oral At Bedtime     dapsone  100 mg Oral At Bedtime     insulin aspart  10 Units Subcutaneous TID w/meals     insulin aspart  1-7 Units Subcutaneous TID AC     insulin aspart  1-5 Units Subcutaneous At Bedtime     NEPHROCAPS  1 capsule Oral Daily            Physical Exam:   Blood pressure 138/61, temperature 97.7  F (36.5  C), temperature source Oral, resp. rate 18, height 1.803 m (5' 11\"), weight 87.4 kg (192 lb 10.9 oz), SpO2 94 %.  Wt Readings from Last 3 Encounters:   03/13/18 87.4 kg (192 lb 10.9 oz)   02/01/18 87.8 kg (193 lb 9.6 oz)   02/01/18 90.3 kg (199 lb 1.2 oz)     I/O last 3 completed shifts:  In: 1287 [P.O.:1090; I.V.:197]  Out: 50 [Urine:50]    CONST:  Alert and oriented  NAD  LUNGS:  Crackles in bases bilat  CARDIO:  RRR, s1, S2  II/VI blowing systolic murmur  ABD:  Soft  +BS  EXT:  No edema  Diminished pedal pulses bilat           Data:   TELE:  SR    CBC    Recent Labs  Lab 03/13/18  0609 03/12/18  0748   WBC 6.3 6.1   HGB 9.7* " 9.1*    155       BMP    Recent Labs  Lab 03/13/18  0609 03/12/18  0748 03/11/18  1243 03/10/18  0730   * 134 135 139   POTASSIUM 4.8 3.8 4.0 4.9   CHLORIDE 94 97 99 104   PRABHA 9.2 9.3 8.9 8.8   CO2 27 30 27 26   BUN 46* 32* 37* 57*   CR 6.17* 4.73* 5.14* 7.55*   GLC 75 124* 134* 133*     Recent Labs   Lab Test  08/09/17   0818  06/05/17   1138   07/23/12   0712   CHOL  125  120   < >  298*   HDL  33*  33*   < >  37*   LDL  30  12   < >  Cannot estimate LDL when triglyceride exceeds 400 mg/dL   TRIG  311*  373*   < >  1541*   CHOLHDLRATIO   --    --    --   8.1*    < > = values in this interval not displayed.       TROP  Lab Results   Component Value Date    TROPI 0.065 (H) 03/09/2018    TROPI 0.074 (H) 03/09/2018    TROPI 0.058 (H) 03/09/2018    TROPI 0.052 (H) 03/08/2018    TROPI 0.045 03/08/2018    TROPONIN 0.02 03/08/2018    TROPONIN 0.02 12/25/2017    TROPONIN 0.02 04/15/2017    TROPONIN 0.02 05/19/2016       BNP    Recent Labs  Lab 03/08/18  0542   NTBNPI [id number]*

## 2018-03-13 NOTE — PROGRESS NOTES
[facility]  Hospitalist Progress Note  Glenna Fernández MD 03/13/2018    Reason for Stay (Diagnosis): ACS with elevated troponin         Assessment and Plan:      Summary of Stay: Donald Raza is a 68 year old male with a history of CAD with prior stenting to the LAD, preserved EF  At 55-60 % by echo in 12/2017, ESRD on HD, T2dm, HIV on therapy, htn/hlp admitted on 3/8/2018 with CP and SOB with e/o hypoxic respiratory failure with sats 83 % requiring short term BiPAP rescue therapy, and persistent CP necessitating NTG gtt. He had mildly elevated trops.    Cardiology is on board and assisting with management, of note most recent angio completed 10/2017 showing no lesions that would require intervention and plan was for medical management but given persistence of symptoms underwent angio am 3/12/18 showing no change so no intervention required    His brief hospitalization is notable for difficult to control hypertension in part do to fluid retention, and ongoing troubles with anemia of unclear etiology necessitating 4 U pRBC transfusion   Problem List:   1. ACS/NSTEMI:  With mildly elevated troponin in setting of ESRD-peaked at 0.074 .  Per initial cardiology consult-suspect CP and mildly elevated trop due to uncontrolled hypertension and chronic anemia please see change in meds as outlined in #3-and recs for hgb > 8.0.  Underwent repeat angio without change from previous.  2. Hypoxic respiratory failure-due to diastolic HF with exacerbation in the setting of uncontrolled hypertension.  Per renal no e/o significant volume overload as at baseline dry weight 87 kg on presentation.  On frequent HD while in hospital, down 5 L, but weight up.  Curious.  Plan for additional HD tomorrow (usual schedule)  3. Htn:  Uncontrolled with BPs ranging from 150-210's.  pta medications were  mg, amlodipine 5 mg, hydralazine 25 mg bid, losartan 50 mg-currently on NTG gtt, amlodipine 5 mg, hydralazine 25 mg bid, and  losartan switched to irbesartan 150 mg.  BPs still uncontrolled, have added in prn labetalol, not clear why he's not on a BB at baseline except ? Bradycardia with prior.  He is not having decent response to med dose metop, so will switch over to carvedilol 12.5 mg bid for better BP management, less likely chance for bradycardia.   4. Anemia; chronic-baseline hgb seems to be 8-9 range, but has had variable hgbs over the past year sometimes dipping into the 5 range.  Had screening EGD and colonoscopy in 2/17 and 5/17 respectively negative for bleeding. Denies any bleeding.    Per cards should try to keep > 8.0-no active bleeding identified, ? Just due to HD and destruction vs BM suppression from medications.  Is on IV iron with HD rec'd  1 U prbc on admission for hgb 7.1- with good response to 9.2, but then 7.1 again within 48 hours.  So transfused additional unit on HD 3/10/18 for a total of 2 U.  Response only to upper 7 range and so transfused 2 u while on HD pre angio 3/11/18 and now hgb in low 9 range.  EPO level back and wnl. I think he needs heme eval and probable bm bx in outpatient setting. Total transfusion thus far 4 U  5. T2dm:  Home regimen is glargine 50 U sq qd, lispro 15 u tid with meals, as well as additional lispro per SS-currently back on glargine at 50 u and lispro 10 u tid with ISS and fair control.   6. HIV with chronically low CD4 150-200 range consistent with AIDs:  Cont home regimen of antivirals  7. Underlying CAD:  Cont clopidogrel/statin, and attempt better BP control as outlined above  8. HLP:  Cont with atorva 40 mg  9. Anxiety:  Cont with prn clonazepam as at home  10. ESRD:  Appreciate nephrology input-ESRD typically Tuesday/thursday/saturday-Now trying for aggressive fluid removal with QD HD to establish new dry weights  DVT Prophylaxis: was on heparin gtt  Code Status: Full Code  # Pain Assessment:   Current Pain Score 3/13/2018 3/13/2018 3/12/2018   Patient currently in pain? denies  "yes denies   Pain score (0-10) - 6 -   Pain location - Head -   Pain descriptors - Headache -   CPOT pain score - - -   - Donald is experiencing pain due to headchhe. Pain management was discussed and the plan was created in a collaborative fashion.  Donald's response to the current recommendations: engaged  - Pharmacologic adjuvants: Acetaminophen        Discharge Dispo: home  Estimated Disch Date / # of Days until Disch:hopefully 1-2 days, will try to get up and ambulate in preparation for returning home        Interval History (Subjective):      Denies any bleeding tendencies, no black tarry stools, no bruising, no epistaxis.  Reports egd and colonoscopy last year to try and identify source and eval negative-I was able to review those results in care everywhere.      Denies any cp, was on HD and apparently had a midhap with access and lost about 100-200 cc blood,mild intermittent headache, still hasn't been up walking around.                    Physical Exam:      Last Vital Signs:  /54  Temp 97.7  F (36.5  C) (Oral)  Resp 18  Ht 1.803 m (5' 11\")  Wt 87.4 kg (192 lb 10.9 oz)  SpO2 94%  BMI 26.87 kg/m2    I/O:  Net - 7000 cc post HD  Weight down .2kg from admission  Tele:  NSR    Pleasant nad looks stated age, appears fatigued.  Head nc/at sclera normal pigmentation. RRR  3/6 harsh lizzette 1 +  le edema no r/g abd s/nt/nd alert and oriented affect appropriate naidu  but no accessory muscle use lungs diminished in the bases            Medications:      All current medications were reviewed with changes reflected in problem list.         Data:      All new lab and imaging data was reviewed.   Labs:    Recent Labs  Lab 03/13/18  0609   *   POTASSIUM 4.8   CHLORIDE 94   CO2 27   ANIONGAP 9   GLC 75   BUN 46*   CR 6.17*   GFRESTIMATED 9*   GFRESTBLACK 11*   PRABHA 9.2       Recent Labs  Lab 03/13/18  0609   WBC 6.3   HGB 9.7*   HCT 31.3*   MCV 95         Imaging:   "

## 2018-03-13 NOTE — PLAN OF CARE
Problem: Patient Care Overview  Goal: Plan of Care/Patient Progress Review  Outcome: Improving  Up SBA with cane. Ambulated in room. A/O cooperative. VSS afebrile. LS clear. Angio today Right groin approach, no intervention,  no s/s of bleeding site intact. Tele SR. BS 87 Apple juice given recheck 122 at dinner time, at bed time . On 2g Na diet, good appetite. IVF stopped. No c/o chest pain or sob. O2 Sat 96% 2L NC.

## 2018-03-13 NOTE — PLAN OF CARE
Problem: Patient Care Overview  Goal: Plan of Care/Patient Progress Review  OT/CR: Order received, chart reviewed, pt admitted with chest pain and SOB, had respiratory distress leading to BiPap, underwent an angio yesterday, no intervention indicated. Per chart pt has diagnosis of cardiomyopathy, should be following low salt diet and have daily exercise. Pt's dtr is his PCA 7 days a week for 7.5 hrs daily, pt has dialysis 3x week including at time of attempted evaluation, will re-attempt later in day as schedule allows otherwise will be rescheduled

## 2018-03-13 NOTE — CONSULTS
[facility] Referral received from ADDIE Pulido. Pt is followed by Cardiology as outpatient by Dr. Diaz and Yuki Hinojosa NP. Pt has had numerous admissions in the last 6 months, but not all for CHF. Pt is on dialysis and fluid management via dialysis. Cardiology is following as inpatient. Discuss with Yuki Hinojosa NP, she does not feel pt needs [facility] at discharge. Will leave hospital follow up plans to hospitalist and Cardiology.         Cristina Ng RN  [facility] nurse  [phone]      [facility]: Cardiomyopathy, Optimization, Rehabilitation, Education   The [facility] is a heart failure specialty clinic within the [facility] where you will work with your cardiologist, nurse practitioners, physician assistants and registered nurses who specialize in heart failure care. They are dedicated to helping patients with heart failure to carefully adjust medications, receive education, and learn who and when to call if symptoms develop. They specialize in helping you better understand your condition, slow the progression of your disease, improve the length and quality of your life, help you detect future heart problems before they become life threatening, and avoid hospitalizations.

## 2018-03-13 NOTE — PROGRESS NOTES
Potassium   Date Value Ref Range Status   03/13/2018 4.8 3.4 - 5.3 mmol/L Final   ]  Lab Results   Component Value Date    HGB 9.7 03/13/2018     Results for GREGORIO BRUSH (MRN [id number]) as of 3/13/2018 09:11   Ref. Range 1/20/2018 14:40   Hep B Surface Agn Latest Ref Range: NR^Nonreactive  Nonreactive   Hepatitis B Surface Antibody Latest Ref Range: <8.00 m[IU]/mL 36.50 (H)     Weight: 87.4 kg (192 lb 10.9 oz)    POST WT 83.4 kg  EDW  87 kg    DIALYSIS PROCEDURE NOTE    Patient dialyzed for 4 hrs on a 2 K bath with a net fluid removal of 4 L.  A BFR of 450 ml/min was obtained via LUAVF.  Patient was seen by  during treatment.  Total heparin received during treatment:  4300 units.  Meds given: Hectorol and Epogen. Complications: Venous needle dislodged when pt repositioned, blood re circulated, new needle placed and run continued, EBL 150ml.  Dr. Wilkes informed.      Procedure teaching done, questions answered.  Consent verified yes  See flowsheet in EPIC for further details and post assessment.    Machine water alarm in place and functioning.  Chlorine and chloramines checked on water system every 4 hours.  All safety checks done, air detector on, venous and arterial parameters set. Transducer protectors checked every 15 min. Personally verified previous hepatitis result from last patient run on machine.    Pt returned via wheelchair.    Outpatient Dialysis at [facility] in [place] ([place]) on [address].    Navya Barrientos RN  [facility]

## 2018-03-14 ENCOUNTER — APPOINTMENT (OUTPATIENT)
Dept: OCCUPATIONAL THERAPY | Facility: CLINIC | Age: 68
DRG: 280 | End: 2018-03-14
Attending: INTERNAL MEDICINE
Payer: MEDICARE

## 2018-03-14 LAB
ANION GAP SERPL CALCULATED.3IONS-SCNC: 9 MMOL/L (ref 3–14)
BUN SERPL-MCNC: 39 MG/DL (ref 7–30)
CALCIUM SERPL-MCNC: 9.3 MG/DL (ref 8.5–10.1)
CHLORIDE SERPL-SCNC: 98 MMOL/L (ref 94–109)
CO2 SERPL-SCNC: 28 MMOL/L (ref 20–32)
CREAT SERPL-MCNC: 4.89 MG/DL (ref 0.66–1.25)
GFR SERPL CREATININE-BSD FRML MDRD: 12 ML/MIN/1.7M2
GLUCOSE BLDC GLUCOMTR-MCNC: 114 MG/DL (ref 70–99)
GLUCOSE BLDC GLUCOMTR-MCNC: 138 MG/DL (ref 70–99)
GLUCOSE BLDC GLUCOMTR-MCNC: 142 MG/DL (ref 70–99)
GLUCOSE BLDC GLUCOMTR-MCNC: 186 MG/DL (ref 70–99)
GLUCOSE BLDC GLUCOMTR-MCNC: 92 MG/DL (ref 70–99)
GLUCOSE SERPL-MCNC: 114 MG/DL (ref 70–99)
HGB BLD-MCNC: 9.6 G/DL (ref 13.3–17.7)
INTERPRETATION ECG - MUSE: NORMAL
POTASSIUM SERPL-SCNC: 4 MMOL/L (ref 3.4–5.3)
SODIUM SERPL-SCNC: 135 MMOL/L (ref 133–144)

## 2018-03-14 PROCEDURE — 25000132 ZZH RX MED GY IP 250 OP 250 PS 637: Mod: GY | Performed by: PHYSICIAN ASSISTANT

## 2018-03-14 PROCEDURE — 25000132 ZZH RX MED GY IP 250 OP 250 PS 637: Mod: GY | Performed by: INTERNAL MEDICINE

## 2018-03-14 PROCEDURE — A9270 NON-COVERED ITEM OR SERVICE: HCPCS | Mod: GY | Performed by: INTERNAL MEDICINE

## 2018-03-14 PROCEDURE — A9270 NON-COVERED ITEM OR SERVICE: HCPCS | Mod: GY | Performed by: PHYSICIAN ASSISTANT

## 2018-03-14 PROCEDURE — 97535 SELF CARE MNGMENT TRAINING: CPT | Mod: GO

## 2018-03-14 PROCEDURE — 25000132 ZZH RX MED GY IP 250 OP 250 PS 637: Mod: GY | Performed by: NURSE PRACTITIONER

## 2018-03-14 PROCEDURE — 80048 BASIC METABOLIC PNL TOTAL CA: CPT | Performed by: INTERNAL MEDICINE

## 2018-03-14 PROCEDURE — 85018 HEMOGLOBIN: CPT | Performed by: INTERNAL MEDICINE

## 2018-03-14 PROCEDURE — 25000131 ZZH RX MED GY IP 250 OP 636 PS 637: Mod: GY | Performed by: PHYSICIAN ASSISTANT

## 2018-03-14 PROCEDURE — 25000128 H RX IP 250 OP 636: Performed by: INTERNAL MEDICINE

## 2018-03-14 PROCEDURE — 40000133 ZZH STATISTIC OT WARD VISIT

## 2018-03-14 PROCEDURE — 99233 SBSQ HOSP IP/OBS HIGH 50: CPT | Performed by: INTERNAL MEDICINE

## 2018-03-14 PROCEDURE — 36415 COLL VENOUS BLD VENIPUNCTURE: CPT | Performed by: INTERNAL MEDICINE

## 2018-03-14 PROCEDURE — 00000146 ZZHCL STATISTIC GLUCOSE BY METER IP

## 2018-03-14 PROCEDURE — 97110 THERAPEUTIC EXERCISES: CPT | Mod: GO

## 2018-03-14 PROCEDURE — 12000007 ZZH R&B INTERMEDIATE

## 2018-03-14 PROCEDURE — A9270 NON-COVERED ITEM OR SERVICE: HCPCS | Mod: GY | Performed by: NURSE PRACTITIONER

## 2018-03-14 PROCEDURE — 90937 HEMODIALYSIS REPEATED EVAL: CPT

## 2018-03-14 PROCEDURE — 97166 OT EVAL MOD COMPLEX 45 MIN: CPT | Mod: GO

## 2018-03-14 RX ORDER — HEPARIN SODIUM 1000 [USP'U]/ML
500 INJECTION, SOLUTION INTRAVENOUS; SUBCUTANEOUS
Status: COMPLETED | OUTPATIENT
Start: 2018-03-14 | End: 2018-03-14

## 2018-03-14 RX ORDER — HEPARIN SODIUM 1000 [USP'U]/ML
500 INJECTION, SOLUTION INTRAVENOUS; SUBCUTANEOUS CONTINUOUS
Status: DISCONTINUED | OUTPATIENT
Start: 2018-03-14 | End: 2018-03-14

## 2018-03-14 RX ADMIN — HEPARIN SODIUM 500 UNITS: 1000 INJECTION, SOLUTION INTRAVENOUS; SUBCUTANEOUS at 11:29

## 2018-03-14 RX ADMIN — RITONAVIR 100 MG: 100 TABLET, FILM COATED ORAL at 22:16

## 2018-03-14 RX ADMIN — DAPSONE 100 MG: 100 TABLET ORAL at 22:15

## 2018-03-14 RX ADMIN — Medication: at 11:31

## 2018-03-14 RX ADMIN — CLOPIDOGREL 75 MG: 75 TABLET, FILM COATED ORAL at 19:22

## 2018-03-14 RX ADMIN — CARVEDILOL 12.5 MG: 12.5 TABLET, FILM COATED ORAL at 17:56

## 2018-03-14 RX ADMIN — OMEGA-3-ACID ETHYL ESTERS 2 G: 1 CAPSULE, LIQUID FILLED ORAL at 12:59

## 2018-03-14 RX ADMIN — CARVEDILOL 12.5 MG: 12.5 TABLET, FILM COATED ORAL at 12:59

## 2018-03-14 RX ADMIN — ATORVASTATIN CALCIUM 40 MG: 40 TABLET, FILM COATED ORAL at 22:15

## 2018-03-14 RX ADMIN — ISOSORBIDE MONONITRATE 120 MG: 60 TABLET, EXTENDED RELEASE ORAL at 19:21

## 2018-03-14 RX ADMIN — HYDRALAZINE HYDROCHLORIDE 25 MG: 25 TABLET ORAL at 22:15

## 2018-03-14 RX ADMIN — ABACAVIR 600 MG: 300 TABLET, FILM COATED ORAL at 19:22

## 2018-03-14 RX ADMIN — INSULIN ASPART 10 UNITS: 100 INJECTION, SOLUTION INTRAVENOUS; SUBCUTANEOUS at 17:58

## 2018-03-14 RX ADMIN — SODIUM CHLORIDE 250 ML: 9 INJECTION, SOLUTION INTRAVENOUS at 11:27

## 2018-03-14 RX ADMIN — GABAPENTIN 300 MG: 300 CAPSULE ORAL at 12:59

## 2018-03-14 RX ADMIN — Medication 1 CAPSULE: at 12:59

## 2018-03-14 RX ADMIN — OMEPRAZOLE 40 MG: 20 CAPSULE, DELAYED RELEASE ORAL at 17:56

## 2018-03-14 RX ADMIN — Medication: at 13:30

## 2018-03-14 RX ADMIN — IRBESARTAN 300 MG: 75 TABLET ORAL at 22:15

## 2018-03-14 RX ADMIN — Medication: at 19:05

## 2018-03-14 RX ADMIN — GABAPENTIN 600 MG: 300 CAPSULE ORAL at 19:22

## 2018-03-14 RX ADMIN — CLONAZEPAM 0.5 MG: 0.5 TABLET ORAL at 00:52

## 2018-03-14 RX ADMIN — OMEGA-3-ACID ETHYL ESTERS 2 G: 1 CAPSULE, LIQUID FILLED ORAL at 19:24

## 2018-03-14 RX ADMIN — HEPARIN SODIUM 500 UNITS/HR: 1000 INJECTION, SOLUTION INTRAVENOUS; SUBCUTANEOUS at 11:30

## 2018-03-14 RX ADMIN — AMLODIPINE BESYLATE 7.5 MG: 2.5 TABLET ORAL at 12:58

## 2018-03-14 RX ADMIN — HYDRALAZINE HYDROCHLORIDE 25 MG: 25 TABLET ORAL at 14:10

## 2018-03-14 RX ADMIN — DOLUTEGRAVIR SODIUM 50 MG: 50 TABLET, FILM COATED ORAL at 22:15

## 2018-03-14 RX ADMIN — ASPIRIN 81 MG: 81 TABLET, COATED ORAL at 12:59

## 2018-03-14 RX ADMIN — INSULIN ASPART 10 UNITS: 100 INJECTION, SOLUTION INTRAVENOUS; SUBCUTANEOUS at 12:00

## 2018-03-14 RX ADMIN — INSULIN GLARGINE 50 UNITS: 100 INJECTION, SOLUTION SUBCUTANEOUS at 12:00

## 2018-03-14 RX ADMIN — MAGNESIUM OXIDE TAB 400 MG (241.3 MG ELEMENTAL MG) 400 MG: 400 (241.3 MG) TAB at 22:16

## 2018-03-14 RX ADMIN — DARUNAVIR 800 MG: 800 TABLET, FILM COATED ORAL at 22:15

## 2018-03-14 ASSESSMENT — ACTIVITIES OF DAILY LIVING (ADL)
ADLS_ACUITY_SCORE: 11
ADLS_ACUITY_SCORE: 11
ADLS_ACUITY_SCORE: 10
ADLS_ACUITY_SCORE: 11
ADLS_ACUITY_SCORE: 10
ADLS_ACUITY_SCORE: 11

## 2018-03-14 NOTE — PLAN OF CARE
Problem: Patient Care Overview  Goal: Plan of Care/Patient Progress Review  Outcome: Improving  HD run 3L off. Great appetite. Glucose 114 and 92. Continues on fluid restriction. Hgb stable 9.6

## 2018-03-14 NOTE — PROGRESS NOTES
[facility]  Hospitalist Progress Note  Glenna Fernández MD 03/14/2018    Reason for Stay (Diagnosis): ACS with elevated troponin         Assessment and Plan:      Summary of Stay: Donald Raza is a 68 year old male with a history of CAD with prior stenting to the LAD, preserved EF  At 55-60 % by echo in 12/2017, ESRD on HD, T2dm, HIV on therapy, htn/hlp admitted on 3/8/2018 with CP and SOB with e/o hypoxic respiratory failure with sats 83 % requiring short term BiPAP rescue therapy, and persistent CP necessitating NTG gtt. He had mildly elevated trops.    Cardiology is on board and assisting with management, of note most recent angio completed 10/2017 showing no lesions that would require intervention and plan was for medical management but given persistence of symptoms underwent angio am 3/12/18 showing no change so no intervention required    His brief hospitalization is notable for difficult to control hypertension in part do to fluid retention, and ongoing troubles with anemia of unclear etiology necessitating 4 U pRBC transfusion.    He has been dialyzed frequently and is now down about 9 L and BPs under much better control and he is feeling better  Problem List:   1. ACS/NSTEMI:  With mildly elevated troponin in setting of ESRD-peaked at 0.074 .  Per initial cardiology consult-suspect CP and mildly elevated trop due to uncontrolled hypertension and chronic anemia please see change in meds as outlined in #3-and recs for hgb > 8.0.  Underwent repeat angio without change from previous.  2. Hypoxic respiratory failure-due to diastolic HF with exacerbation in the setting of uncontrolled hypertension.  Partly due to volume overload and needs new dry weight - previously 87 kg (which is where he came in at) now down 9 L and BPs and breathing dramatically improved.  Probably to HD tomorrow (usual sched is T/th/sat)  3. Htn:  Uncontrolled with BPs ranging from 150-210's.  pta medications were  mg,  amlodipine 5 mg, hydralazine 25 mg bid, losartan 50 mg-currently on amlodipine 5 mg, hydralazine 25 mg bid, and losartan switched to irbesartan 150 mg.  BPs still uncontrolled, have added in prn labetalol, not clear why he's not on a BB at baseline except ? Bradycardia with prior.  He is not having decent response to med dose metop, so switchede over to carvedilol 12.5 mg bid for better BP management, less likely chance for bradycardia and of course volume down which has also significantly helped  4. Anemia; chronic-baseline hgb seems to be 8-9 range, but has had variable hgbs over the past year sometimes dipping into the 5 range.  Had screening EGD and colonoscopy in 2/17 and 5/17 respectively negative for bleeding. Denies any bleeding.    Per cards should try to keep > 8.0-no active bleeding identified, ? Just due to HD and destruction vs BM suppression from medications.  Is on IV iron with HD rec'd  1 U prbc on admission for hgb 7.1- with good response to 9.2, but then 7.1 again within 48 hours.  So transfused additional unit on HD 3/10/18 for a total of 2 U.  Response only to upper 7 range and so transfused 2 u while on HD pre angio 3/11/18 and now hgb in low 9 range.  EPO level back and wnl. Total transfusion thus far 4 U.  Consider heme eval in OP setting if recurs or fails to resolve  5. T2dm:  Home regimen is glargine 50 U sq qd, lispro 15 u tid with meals, as well as additional lispro per SS-currently back on glargine at 50 u and lispro 10 u tid with ISS and good control.   6. HIV with chronically low CD4 150-200 range consistent with AIDs:  Cont home regimen of antivirals  7. Underlying CAD:  Cont clopidogrel/statin, and attempt better BP control as outlined above  8. HLP:  Cont with atorva 40 mg  9. Anxiety:  Cont with prn clonazepam as at home  10. ESRD:  Appreciate nephrology input-ESRD typically Tuesday/thursday/saturday-Now trying for aggressive fluid removal with QD HD to establish new dry  "weights  DVT Prophylaxis: was on heparin gtt  Code Status: Full Code  # Pain Assessment:   Current Pain Score 3/14/2018 3/14/2018 3/14/2018   Patient currently in pain? denies denies denies   Pain score (0-10) - - -   Pain location - - -   Pain descriptors - - -   CPOT pain score - - -   - not having any pain today         Discharge Dispo: home  Estimated Disch Date / # of Days until Disch:hopefully 1-2 days, will try to get up and ambulate in preparation for returning home        Interval History (Subjective):      Feeling much better today no cp or sob, po intake is good.  Was a little dizzy after HD which has resolved                  Physical Exam:      Last Vital Signs:  /83  Temp 97.8  F (36.6  C) (Oral)  Resp 16  Ht 1.803 m (5' 11\")  Wt 84.9 kg (187 lb 2.7 oz)  SpO2 98%  BMI 26.11 kg/m2    I/O:  Net - 9000 cc post HD  Weight down 3 kg (prior to HD today) from admission  Tele:  NSR    Pleasant nad looks stated age, appears fatigued.  Head nc/at sclera normal pigmentation. RRR  3/6 harsh lizzette 1 +  le edema no r/g abd s/nt/nd alert and oriented affect appropriate naidu  but no accessory muscle use lungs diminished in the bases            Medications:      All current medications were reviewed with changes reflected in problem list.         Data:      All new lab and imaging data was reviewed.   Labs:    Recent Labs  Lab 03/14/18  0829      POTASSIUM 4.0   CHLORIDE 98   CO2 28   ANIONGAP 9   *   BUN 39*   CR 4.89*   GFRESTIMATED 12*   GFRESTBLACK 14*   PRABHA 9.3       Recent Labs  Lab 03/14/18  0829 03/13/18  0609   WBC  --  6.3   HGB 9.6* 9.7*   HCT  --  31.3*   MCV  --  95   PLT  --  160      Imaging:   "

## 2018-03-14 NOTE — PLAN OF CARE
Problem: Patient Care Overview  Goal: Plan of Care/Patient Progress Review  Outcome: Improving  Patient alert and oriented, up with assist of one with a cane. Angio from right groin, CMS intact, no signs of bleeding. BP meds held on PM shift due to dialysis, BP stable 157/74. Patient will have an extra run in dialysis this morning, AV fistula in left arm, bruit present. AM hydralazine held. Denies shortness of breath and chest pain. Will continue POC.

## 2018-03-14 NOTE — PLAN OF CARE
Problem: Patient Care Overview  Goal: Plan of Care/Patient Progress Review  OT/CR: attempted to see pt for eval this am however he had left for dialysis just prior to arrival. Order received 3 days ago however not yet able to initiate given patient's medical needs including surgery/angio yesterday and dialysis. Will attempt to see this PM and proceed if patient able to tolerate participation following dialysis.

## 2018-03-14 NOTE — CONSULTS
NUTRITION ASSESSMENT BRIEF NOTE    REASON FOR ASSESSMENT:  LOS F/U    CURRENT DIET AND NOURISHMENT ORDER:  Diet: 2 gram Na, Nepro with meals  Current Intake/Tolerance: 100% intake for documented meals - patient reports good appetite and agreeable to renal diet restriction    LABS/MEDICATIONS/PHYSICAL FINDINGS:  Labs reviewed  Recent Labs   Lab Test  03/14/18   0829  03/13/18   0609  03/12/18   0748  03/11/18   1243  03/10/18   0730   POTASSIUM  4.0  4.8  3.8  4.0  4.9     Recent Labs   Lab Test  03/13/18   0609  12/27/17   0720  04/15/17   0819  04/11/17   0628  04/10/17   0729   PHOS  5.9*  5.1*  6.9*  4.9*  4.2     Recent Labs   Lab Test  12/27/17   0720  10/10/17   2225  04/15/17   0819  04/09/17   0620  01/25/17   1242   MAG  2.2  1.9  2.8*  2.6*  2.1         Recent Labs  Lab 03/14/18  0829 03/13/18  0609 03/12/18  0748 03/11/18  1243 03/10/18  0730 03/09/18  0709 03/09/18  0023 03/08/18  0542   * 75 124* 134* 133* 138* 131* 161*     Meds reviewed  3/13 HD; planned daily runs to achieve dry weight    MALNUTRITION:  Patient does not meet two of the following criteria necessary for diagnosing malnutrition: significant weight loss, reduced intake, subcutaneous fat loss, muscle loss or fluid retention    NUTRITION DIAGNOSIS:  Increased nutrient needs (protein) related to dialysis needs as evidenced by reliance on protein supplements to meet needs of 1.2-1.8 grams per kg    INTERVENTIONS:  Nutrition Prescription:  Recommended dialysis diet, Nepro TID with meals    Implementation:  Diet order: updated to include renal restrictions --> liberalize per MD discretion  Collaboration and Referral of care: Discussed patient during interdisciplinary care rounds this morning    Goals:  Patient to consume >75% of meals TID and 3 high protein supplements per day    Follow Up/Monitoring:  Progress towards goals will be monitored and evaluated per protocol and Practice Guidelines      Reina Longoria, WILMER, LD, CNSC  Pager -  3rd floor/ICU: [phone]  Pager - All other floors: [phone]  Pager - Weekend/holiday: [phone]  Office: [phone]

## 2018-03-14 NOTE — PLAN OF CARE
Problem: Patient Care Overview  Goal: Plan of Care/Patient Progress Review  Outcome: Improving  Pt up in chair most of this shift. Ambulated in hallway SBA with cane, fairly stable gait but c/o feeling dizziness with standing. VSS, LS clear. No c/o chest pain, sob, no discomfort. PIV saline locked.  at dinner time and 118 at bed time.

## 2018-03-14 NOTE — PROGRESS NOTES
[facility]     Renal Progress Note       SHORTHAND KEY FOR MY NOTES:  c = with, s = without, p = after, a = before, x = except, asx = asymptomatic, tx = transplant or treatment, sx = symptoms or symptomatic, cx = canceled or culture, rxn = reaction, yday = yesterday, nl = normal, abx = antibiotics, fxn = function, dx = diagnosis, dz = disease, m/h = melena/hematochezia, c/d/l/ha = cramping/dizziness/lightheadedness/headache, d/c = discharge or diarrhea/constipation, f/c/n/v = fevers/chills/nausea/vomiting, cp/sob = chest pain/shortness of breath.         Assessment/Plan:     1.  ESKD.  Pt dialysed again today and had 3L more removed.  He will need to keep pushing his EDW down.  He is getting drier, but still hasn't had any signs/sx that he's dry, yet (i.e. low BP, cramping).  A.  HD again tmrw.    B.  Keep pushing EDW lower.    2.  HTN.  Pt's BP is getting lower.  He is still on a lot of meds, and hopefully we will be able to wean some off as we get better control of his BP.  A.  Continue meds for now.  B.  If we are unable to pull fluid due to low BPs, then we will stop the amlod first.    3.  Anemia.  Hb stable in the 9s.    A.  Follow hb, clinically.    4.  FEN.  Electrolytes are ok.  On dialysis diet + 1500 cc fluid restriction.  A.  Continue same plan.        Interval History:     Pt feels ok and had another good run today.  No c/d/l.  He notes that his legs have no swelling.           Medications and Allergies:       amLODIPine (NORVASC) tablet 7.5 mg  7.5 mg Oral Daily     carvedilol  12.5 mg Oral BID w/meals     iopamidol  100 mL INTRA-ARTERIAL Once     iopamidol  100 mL INTRA-ARTERIAL Once     sodium chloride (PF)  3 mL Intracatheter Q8H     aspirin EC  81 mg Oral Daily     irbesartan  300 mg Oral At Bedtime     - MEDICATION INSTRUCTIONS for Dialysis Patients -   Does not apply See Admin Instructions     hydrALAZINE (APRESOLINE) tablet 25 mg  25 mg Oral Q8H JAN     sodium chloride (PF)  3 mL  "Intracatheter Q8H     abacavir  600 mg Oral QPM     atorvastatin  40 mg Oral At Bedtime     clopidogrel (PLAVIX) tablet 75 mg  75 mg Oral QPM     darunavir  800 mg Oral At Bedtime     dolutegravir  50 mg Oral At Bedtime     gabapentin  300 mg Oral QAM     gabapentin  600 mg Oral QPM     insulin glargine  50 Units Subcutaneous Daily     isosorbide mononitrate  120 mg Oral QPM     magnesium oxide (MAG-OX) tablet 400 mg  400 mg Oral At Bedtime     NEPRO   Oral BID     omega-3 acid ethyl esters  2 g Oral BID     omeprazole  40 mg Oral Daily     ritonavir  100 mg Oral At Bedtime     dapsone  100 mg Oral At Bedtime     insulin aspart  10 Units Subcutaneous TID w/meals     insulin aspart  1-7 Units Subcutaneous TID AC     insulin aspart  1-5 Units Subcutaneous At Bedtime     NEPHROCAPS  1 capsule Oral Daily     Allergies   Allergen Reactions     Calcium Acetate Other (See Comments)     Other reaction(s): Other, see comments  Pain in chest and back  Pain in chest area (sensitive skin)      Diagnostic X-Ray Materials Other (See Comments)     PN: renal failure     Lisinopril      Sulfa Drugs           Physical Exam:     Vitals were reviewed    Heart Rate: 69, Blood pressure 147/71, temperature 97.7  F (36.5  C), temperature source Oral, resp. rate 20, height 1.803 m (5' 11\"), weight 84.9 kg (187 lb 2.7 oz), SpO2 92 %.  Wt Readings from Last 3 Encounters:   03/14/18 84.9 kg (187 lb 2.7 oz)   02/01/18 87.8 kg (193 lb 9.6 oz)   02/01/18 90.3 kg (199 lb 1.2 oz)     Intake/Output Summary (Last 24 hours) at 03/14/18 1756  Last data filed at 03/14/18 1416   Gross per 24 hour   Intake              570 ml   Output             3011 ml   Net            -2441 ml     GENERAL APPEARANCE: pleasant, lying in bed, looks ok  HEENT:  Eyes/ears/nose/neck grossly normal  RESP: lungs pretty cta b  CV: RRR c 2/6 m, nl S1/S2  ABDOMEN: o/s/nt/nd  EXTREMITIES/SKIN: no ble edema  OTHER:  + LUAF - good thrill/bruit         Data:     CBC " RESULTS:    Recent Labs  Lab 03/14/18  0829 03/13/18  0609 03/12/18  0748 03/11/18  1243 03/10/18  0730 03/09/18  0709 03/09/18  0050 03/08/18  0542   WBC  --  6.3 6.1  --  5.8 6.1 6.6 4.7   RBC  --  3.28* 3.11*  --  2.47* 2.59* 3.10* 2.43*   HGB 9.6* 9.7* 9.1* 7.7* 7.1* 7.5* 9.2* 7.1*   HCT  --  31.3* 29.3*  --  23.3* 25.1* 29.3* 22.9*   PLT  --  160 155  --  160 174 239 188     Basic Metabolic Panel:    Recent Labs  Lab 03/14/18  0829 03/13/18  0609 03/12/18  0748 03/11/18  1243 03/10/18  0730 03/09/18  0709    130* 134 135 139 136   POTASSIUM 4.0 4.8 3.8 4.0 4.9 4.3   CHLORIDE 98 94 97 99 104 101   CO2 28 27 30 27 26 30   BUN 39* 46* 32* 37* 57* 44*   CR 4.89* 6.17* 4.73* 5.14* 7.55* 5.56*   * 75 124* 134* 133* 138*   PRABHA 9.3 9.2 9.3 8.9 8.8 8.8     INRNo lab results found in last 7 days.   Attestation:   I have reviewed today's relevant vital signs, notes, medications, labs and imaging.    Bipin Chaves MD  [facility] - Nephrology  [phone]

## 2018-03-14 NOTE — PROGRESS NOTES
Potassium   Date Value Ref Range Status   03/14/2018 4.0 3.4 - 5.3 mmol/L Final   ]  Lab Results   Component Value Date    HGB 9.6 03/14/2018     Results for GREGORIO BRUSH (MRN [id number]) as of 3/14/2018 09:44   Ref. Range 1/20/2018 14:40   Hep B Surface Agn Latest Ref Range: NR^Nonreactive  Nonreactive   Hepatitis B Surface Antibody Latest Ref Range: <8.00 m[IU]/mL 36.50 (H)       Weight: 84.9 kg (187 lb 3.2 oz)    POST WT 81.9 kg  EDW  87 kg    DIALYSIS PROCEDURE NOTE    Patient dialyzed for 3.5 hrs on a 2 K bath for 1 hour and a 3 K bath for 2.5 hours with a net fluid removal of 3 L.  A BFR of 400 ml/min was obtained via LUAVF.  Patient was seen by  during treatment.  Total heparin received during treatment:  1700 units.  Meds given: None. Complications: None.      Procedure teaching done, questions answered.  Consent verified yes  See flowsheet in EPIC for further details and post assessment.    Machine water alarm in place and functioning.  Chlorine and chloramines checked on water system every 4 hours.  All safety checks done, air detector on, venous and arterial parameters set. Transducer protectors checked every 15 min. Personally verified previous hepatitis result from last patient run on machine.    Pt returned via wheelchair.    Outpatient Dialysis at [facility] in [place] ([place] on [facility].    Navya Barrientos RN  [facility]

## 2018-03-14 NOTE — PROGRESS NOTES
03/14/18 1436   Quick Adds   Type of Visit Initial Occupational Therapy Evaluation   Living Environment   Lives With child(jean), adult;sibling(s);spouse   Living Arrangements house   Home Accessibility stairs to enter home;stairs within home;bed and bath are not on the first floor   Number of Stairs to Enter Home 1   Number of Stairs Within Home 16  (split; 2 sets of 8)   Stair Railings at Home inside, present on right side   Transportation Available family or friend will provide;car   Self-Care   Dominant Hand right   Usual Activity Tolerance moderate   Current Activity Tolerance fair   Regular Exercise no   Equipment Currently Used at Home cane, straight;grab bar;shower chair  (outside only)   Functional Level Prior   Ambulation 1-->assistive equipment   Transferring 0-->independent   Toileting 0-->independent   Bathing 0-->independent   Dressing 0-->independent   Eating 0-->independent   Communication 0-->understands/communicates without difficulty   Swallowing 0-->swallows foods/liquids without difficulty   Cognition 0 - no cognition issues reported   Fall history within last six months no   Prior Functional Level Comment Daughter and wife do cooking/cleaning/laundry and dtr assists patient with meds and finances Per chart, daughter is PCA 7days/week, has dialysis 3x/wk due to ESRD.   General Information   Onset of Illness/Injury or Date of Surgery - Date 03/08/18   Referring Physician Dr. Jordan   Patient/Family Goals Statement return home   Additional Occupational Profile Info/Pertinent History of Current Problem 67yo male admitted with SOB and chest pain, underwent angio on 3/13 and no intervention performed. Dx: cardiomyopathy. PMH includes CAD with prior stenting to the LAD, preserved EF  At 55-60 % by echo in 12/2017, ESRD on HD, T2dm, HIV on therapy,.ESRD on dialysis 3x/wk, DM.   Precautions/Limitations fall precautions   Cognitive Status Examination   Orientation orientation to person, place and time    Level of Consciousness alert   Able to Follow Commands success, 2-step commands   Visual Perception   Visual Perception Wears glasses  (has cataracts with plan in place to remove)   Sensory Examination   Sensory Comments Pt admits to B foot neuropathy causing occasionally and has numbness/tingling in L arm/hand frequently   Pain Assessment   Patient Currently in Pain No   Range of Motion (ROM)   ROM Quick Adds No deficits were identified   Strength   Manual Muscle Testing Quick Adds No deficits were identified   Coordination   Coordination Comments Pt reports that some FM tasks difficult due to diabetic neuropathy in hands   Mobility   Bed Mobility Comments SBA   Transfer Skill: Bed to Chair/Chair to Bed   Level of Boyle: Bed to Chair minimum assist (75% patients effort)   Physical Assist/Nonphysical Assist: Bed to Chair 1 person assist   Assistive Device - Transfer Skill Bed to Chair Chair to Bed Rehab Eval straight cane   Transfer Skill: Sit to Stand   Level of Boyle: Sit/Stand contact guard   Physical Assist/Nonphysical Assist: Sit/Stand 1 person assist   Transfer Skill: Toilet Transfer   Level of Boyle: Toilet stand-by assist   Physical Assist/Nonphysical Assist: Toilet supervision;verbal cues   Assistive Device rolling walker;grab bars   Balance   Balance Comments Min A of 1 with amb as slightly unsteady on feet using cane, indep static sitting   Lower Body Dressing   Level of Boyle: Dress Lower Body minimum assist (75% patients effort)   Physical Assist/Nonphysical Assist: Dress Lower Body 1 person assist   Toileting   Level of Boyle: Toilet stand-by assist   Physical Assist/Nonphysical Assist: Toilet supervision;verbal cues   Activities of Daily Living Analysis   Impairments Contributing to Impaired Activities of Daily Living balance impaired;strength decreased   General Therapy Interventions   Planned Therapy Interventions ADL retraining;IADL retraining;transfer  "training;home program guidelines;progressive activity/exercise;risk factor education   Clinical Impression   Criteria for Skilled Therapeutic Interventions Met yes, treatment indicated   OT Diagnosis decreased ADL/IADL performance   Influenced by the following impairments strength, endurance, balance   Assessment of Occupational Performance 1-3 Performance Deficits   Identified Performance Deficits functional mob, toileting, bathing   Clinical Decision Making (Complexity) Moderate complexity   Therapy Frequency daily   Predicted Duration of Therapy Intervention (days/wks) 5 days   Anticipated Discharge Disposition Home with Assist   Risks and Benefits of Treatment have been explained. Yes   Patient, Family & other staff in agreement with plan of care Yes   [facility] TM \"6 Clicks\"   2016, Trustees of [facility], under license to [employer]..  All rights reserved.   6 Clicks Short Forms Daily Activity Inpatient Short Form   [facility]  \"6 Clicks\" Daily Activity Inpatient Short Form   1. Putting on and taking off regular lower body clothing? 3 - A Little   2. Bathing (including washing, rinsing, drying)? 3 - A Little   3. Toileting, which includes using toilet, bedpan or urinal? 4 - None   4. Putting on and taking off regular upper body clothing? 4 - None   5. Taking care of personal grooming such as brushing teeth? 4 - None   6. Eating meals? 4 - None   Daily Activity Raw Score (Score out of 24.Lower scores equate to lower levels of function) 22   Total Evaluation Time   Total Evaluation Time (Minutes) 15     "

## 2018-03-14 NOTE — PLAN OF CARE
Problem: Patient Care Overview  Goal: Plan of Care/Patient Progress Review  OT/cardiac rehab evaluation completed and treatment initiated. See H&P and OT eval  for full report of patient admit and baseline information. Pt lives with family in split-level home and needs to be able to manage 16 steps to return home at [place].   Discharge Planner OT   Patient plan for discharge: home w/family assist  Current status: Indep bed mob, SBA toilet transfer and CGA-Min A with amb in hallway using cane as pt slightly unsteady. Tolerated amb approx 100ft x 2 before citing fatigue and needing to sit. No signficant change in VS. /73 to 149/83, HR 66-70. 02 95% to 92% on RA. Required set-up and Min A with dressing. OT to see daily as able to advance strength/activity tolerance with goal of return home at CT.   Barriers to return to prior living situation: stairs, general weakness  Recommendations for discharge: home w/family assist  Rationale for recommendations: Pt's family provides assist with all household tasks, driving, medications and finances.        Entered by: Cy Obrien 03/14/2018 5:12 PM

## 2018-03-15 LAB
GLUCOSE BLDC GLUCOMTR-MCNC: 107 MG/DL (ref 70–99)
GLUCOSE BLDC GLUCOMTR-MCNC: 145 MG/DL (ref 70–99)
GLUCOSE BLDC GLUCOMTR-MCNC: 160 MG/DL (ref 70–99)
GLUCOSE BLDC GLUCOMTR-MCNC: 200 MG/DL (ref 70–99)
GLUCOSE BLDC GLUCOMTR-MCNC: 63 MG/DL (ref 70–99)
HGB BLD-MCNC: 10.1 G/DL (ref 13.3–17.7)

## 2018-03-15 PROCEDURE — A9270 NON-COVERED ITEM OR SERVICE: HCPCS | Mod: GY | Performed by: INTERNAL MEDICINE

## 2018-03-15 PROCEDURE — 25000132 ZZH RX MED GY IP 250 OP 250 PS 637: Mod: GY | Performed by: INTERNAL MEDICINE

## 2018-03-15 PROCEDURE — 90937 HEMODIALYSIS REPEATED EVAL: CPT

## 2018-03-15 PROCEDURE — 25000128 H RX IP 250 OP 636: Performed by: INTERNAL MEDICINE

## 2018-03-15 PROCEDURE — 25000132 ZZH RX MED GY IP 250 OP 250 PS 637: Mod: GY | Performed by: NURSE PRACTITIONER

## 2018-03-15 PROCEDURE — A9270 NON-COVERED ITEM OR SERVICE: HCPCS | Mod: GY | Performed by: NURSE PRACTITIONER

## 2018-03-15 PROCEDURE — 99232 SBSQ HOSP IP/OBS MODERATE 35: CPT | Performed by: INTERNAL MEDICINE

## 2018-03-15 PROCEDURE — 00000146 ZZHCL STATISTIC GLUCOSE BY METER IP

## 2018-03-15 PROCEDURE — 36415 COLL VENOUS BLD VENIPUNCTURE: CPT | Performed by: INTERNAL MEDICINE

## 2018-03-15 PROCEDURE — A9270 NON-COVERED ITEM OR SERVICE: HCPCS | Mod: GY | Performed by: PHYSICIAN ASSISTANT

## 2018-03-15 PROCEDURE — 63400005 ZZH RX 634: Performed by: INTERNAL MEDICINE

## 2018-03-15 PROCEDURE — 25000132 ZZH RX MED GY IP 250 OP 250 PS 637: Mod: GY | Performed by: PHYSICIAN ASSISTANT

## 2018-03-15 PROCEDURE — 85018 HEMOGLOBIN: CPT | Performed by: INTERNAL MEDICINE

## 2018-03-15 PROCEDURE — 12000007 ZZH R&B INTERMEDIATE

## 2018-03-15 PROCEDURE — 25000131 ZZH RX MED GY IP 250 OP 636 PS 637: Mod: GY | Performed by: PHYSICIAN ASSISTANT

## 2018-03-15 RX ORDER — ALBUMIN (HUMAN) 12.5 G/50ML
50 SOLUTION INTRAVENOUS
Status: DISCONTINUED | OUTPATIENT
Start: 2018-03-15 | End: 2018-03-15

## 2018-03-15 RX ORDER — ALBUMIN, HUMAN INJ 5% 5 %
250 SOLUTION INTRAVENOUS
Status: DISCONTINUED | OUTPATIENT
Start: 2018-03-15 | End: 2018-03-15

## 2018-03-15 RX ADMIN — Medication: at 09:43

## 2018-03-15 RX ADMIN — ERYTHROPOIETIN 12000 UNITS: 10000 INJECTION, SOLUTION INTRAVENOUS; SUBCUTANEOUS at 09:46

## 2018-03-15 RX ADMIN — HYDRALAZINE HYDROCHLORIDE 25 MG: 25 TABLET ORAL at 14:36

## 2018-03-15 RX ADMIN — ACETAMINOPHEN 650 MG: 325 TABLET, FILM COATED ORAL at 16:26

## 2018-03-15 RX ADMIN — DARUNAVIR 800 MG: 800 TABLET, FILM COATED ORAL at 21:07

## 2018-03-15 RX ADMIN — SODIUM CHLORIDE 300 ML: 9 INJECTION, SOLUTION INTRAVENOUS at 09:42

## 2018-03-15 RX ADMIN — INSULIN ASPART 10 UNITS: 100 INJECTION, SOLUTION INTRAVENOUS; SUBCUTANEOUS at 07:40

## 2018-03-15 RX ADMIN — GABAPENTIN 300 MG: 300 CAPSULE ORAL at 14:36

## 2018-03-15 RX ADMIN — OMEPRAZOLE 40 MG: 20 CAPSULE, DELAYED RELEASE ORAL at 16:26

## 2018-03-15 RX ADMIN — HYDRALAZINE HYDROCHLORIDE 25 MG: 25 TABLET ORAL at 21:07

## 2018-03-15 RX ADMIN — SODIUM CHLORIDE 250 ML: 9 INJECTION, SOLUTION INTRAVENOUS at 09:41

## 2018-03-15 RX ADMIN — ATORVASTATIN CALCIUM 40 MG: 40 TABLET, FILM COATED ORAL at 21:07

## 2018-03-15 RX ADMIN — RITONAVIR 100 MG: 100 TABLET, FILM COATED ORAL at 21:07

## 2018-03-15 RX ADMIN — IRBESARTAN 300 MG: 75 TABLET ORAL at 21:07

## 2018-03-15 RX ADMIN — ASPIRIN 81 MG: 81 TABLET, COATED ORAL at 14:37

## 2018-03-15 RX ADMIN — OMEGA-3-ACID ETHYL ESTERS 2 G: 1 CAPSULE, LIQUID FILLED ORAL at 21:07

## 2018-03-15 RX ADMIN — ABACAVIR 600 MG: 300 TABLET, FILM COATED ORAL at 21:06

## 2018-03-15 RX ADMIN — GABAPENTIN 600 MG: 300 CAPSULE ORAL at 21:06

## 2018-03-15 RX ADMIN — CARVEDILOL 12.5 MG: 12.5 TABLET, FILM COATED ORAL at 18:42

## 2018-03-15 RX ADMIN — DAPSONE 100 MG: 100 TABLET ORAL at 21:07

## 2018-03-15 RX ADMIN — CLOPIDOGREL 75 MG: 75 TABLET, FILM COATED ORAL at 21:07

## 2018-03-15 RX ADMIN — INSULIN GLARGINE 50 UNITS: 100 INJECTION, SOLUTION SUBCUTANEOUS at 14:37

## 2018-03-15 RX ADMIN — INSULIN ASPART 10 UNITS: 100 INJECTION, SOLUTION INTRAVENOUS; SUBCUTANEOUS at 18:42

## 2018-03-15 RX ADMIN — OMEGA-3-ACID ETHYL ESTERS 2 G: 1 CAPSULE, LIQUID FILLED ORAL at 07:40

## 2018-03-15 RX ADMIN — Medication 1 CAPSULE: at 14:36

## 2018-03-15 RX ADMIN — MAGNESIUM OXIDE TAB 400 MG (241.3 MG ELEMENTAL MG) 400 MG: 400 (241.3 MG) TAB at 21:07

## 2018-03-15 RX ADMIN — AMLODIPINE BESYLATE 7.5 MG: 2.5 TABLET ORAL at 14:36

## 2018-03-15 RX ADMIN — INSULIN ASPART 10 UNITS: 100 INJECTION, SOLUTION INTRAVENOUS; SUBCUTANEOUS at 14:37

## 2018-03-15 RX ADMIN — DOLUTEGRAVIR SODIUM 50 MG: 50 TABLET, FILM COATED ORAL at 21:07

## 2018-03-15 RX ADMIN — ISOSORBIDE MONONITRATE 120 MG: 60 TABLET, EXTENDED RELEASE ORAL at 21:07

## 2018-03-15 ASSESSMENT — ACTIVITIES OF DAILY LIVING (ADL)
ADLS_ACUITY_SCORE: 10

## 2018-03-15 NOTE — PROGRESS NOTES
[facility]  Hospitalist Progress Note  Marquez Tolliver,  03/15/2018    Reason for Stay (Diagnosis): Acute coronary syndrome.         Assessment and Plan:      Summary of Stay: Donald Raza is a 68 year old male with a history of CAD with prior stenting to the LAD, preserved EF  At 55-60 % by echo in 12/2017, ESRD on HD, T2dm, HIV on therapy, htn/hlp admitted on 3/8/2018 with CP and SOB with e/o hypoxic respiratory failure with sats 83 % requiring short term BiPAP rescue therapy, and persistent CP necessitating NTG gtt. He had mildly elevated trops.     Cardiology is on board and assisting with management, of note most recent angio completed 10/2017 showing no lesions that would require intervention and plan was for medical management but given persistence of symptoms underwent angio am 3/12/18 showing no change so no intervention required     His hospitalization is notable for difficult to control hypertension in part do to fluid retention, and ongoing troubles with anemia of unclear etiology necessitating 4 U pRBC transfusion.       Problem List:   1. ACS/NSTEMI:  With mildly elevated troponin in setting of ESRD-peaked at 0.074 .    Seen in consultation by cardiology.  Did undergo coronary angiogram on 3/12/18.  No change noted on angiogram from previous.  Continue with medical management.  Continue aspirin, atorvastatin, carvedilol, clopidogrel, Imdur, and irbesartan.  2. Hypoxic respiratory failure-due to diastolic HF with exacerbation in the setting of uncontrolled hypertension.  Partly due to volume overload and needs new dry weight.  Nephrology following.  Plan is for dialysis again today, 3/15/18.  3. Htn:  Uncontrolled with BPs ranging from 150-210's.    Now improved.  Continue Imdur, irbesartan, carvedilol, hydralazine, and amlodipine.  4. Anemia; chronic-baseline hgb seems to be 8-9 range, but has had variable hgbs over the past year sometimes dipping into the 5 range.  Had screening  "EGD and colonoscopy in 2/17 and 5/17 respectively negative for bleeding. Denies any bleeding.     With his underlying cardiac issues, goal should be to keep his hemoglobin above 8.  Is on IV iron with HD rec'd  1 U prbc on admission for hgb 7.1- with good response to 9.2, but then 7.1 again within 48 hours.  So transfused additional unit on HD 3/10/18 for a total of 2 U.  Response only to upper 7 range and so transfused 2 u while on HD pre angio 3/11/18 and now hgb in low 9 range.  EPO level back and wnl. Total transfusion thus far 4 U.  Consider heme eval in OP setting if recurs or fails to resolve  5. Diabetes Mellitus.  Continue Lantus, scheduled Novolog, and Novolog SSI.    6. HIV with chronically low CD4 150-200 range consistent with AIDs:  Cont home regimen of antivirals  7. HLP:   Continue atorvastatin.  8. Anxiety:   Clonazepam as needed.  9. ESRD:  Appreciate nephrology input-ESRD typically Tuesday/thursday/saturday-Now trying for aggressive fluid removal with QD HD to establish new dry weights  10. DVT Prophylaxis: Pneumatic Compression Devices  Code Status: Full Code  Discharge Dispo: Home  Estimated Disch Date / # of Days until Disch: 1-2        Interval History (Subjective):      Still short of breath.  Chest feels tight.  Does feel mildly improved from previous.  Denies any chest pain.  No fevers, chills, nausea, vomiting, or diarrhea.                  Physical Exam:      Last Vital Signs:  /61  Temp 97.6  F (36.4  C) (Oral)  Resp 16  Ht 1.803 m (5' 11\")  Wt 84.2 kg (185 lb 10 oz)  SpO2 94%  BMI 25.89 kg/m2    Gen:  NAD, A&Ox3.  Eyes:  PERRL, sclera anicteric.  OP:  MMM, no lesions.  Neck:  Supple.  CV:  Regular, +2/6 murmur.  Lung:  CTA b/l, normal effort.  Ab:  +BS, soft.  Skin:  Warm, dry to touch.  No rash.  Ext:  1+ pitting edema LE b/l.           Medications:      All current medications were reviewed with changes reflected in problem list.         Data:      All new lab and imaging " data was reviewed.   Labs:    Recent Labs  Lab 03/14/18  0829      POTASSIUM 4.0   CHLORIDE 98   CO2 28   ANIONGAP 9   *   BUN 39*   CR 4.89*   GFRESTIMATED 12*   GFRESTBLACK 14*   PRABHA 9.3       Recent Labs  Lab 03/15/18  0707  03/13/18  0609   WBC  --   --  6.3   HGB 10.1*  < > 9.7*   HCT  --   --  31.3*   MCV  --   --  95   PLT  --   --  160   < > = values in this interval not displayed.   Imaging:   No results found for this or any previous visit (from the past 24 hour(s)).

## 2018-03-15 NOTE — PLAN OF CARE
Problem: Patient Care Overview  Goal: Plan of Care/Patient Progress Review  Outcome: Improving  Dialysis run with fluid off. Ambulated to and from dialysis room ! B/P improving. Anticipate possible d/c tomorrow.

## 2018-03-15 NOTE — PLAN OF CARE
Problem: Patient Care Overview  Goal: Plan of Care/Patient Progress Review  OT/cardiac rehab: attempted to see patient this am however he cited fatigue declining participation as had been up on feet with nursing, at dialysis and unable to tolerate further activity wanting to rest. Pt hopes to return home to tomorrow. Will resume tomorrow per pt tolerance.

## 2018-03-15 NOTE — PROGRESS NOTES
Inpatient Dialysis Progress Note            Assessment and Plan:   1.  ESKD. Running today for third day in a row.  Aggressive UF due to high LVEDP.  He has tolerated the fluid removal without any trouble. No hypotension or cramps. His weight post HD today should be ~81 kg.  This might not even be his target weight.  It will have to continue to be challenged as an outpt.  I will talk to his HD unit and to his nephrologist Dr. Chavez.      2.  Anemia.v He has had 3 units of RBCs since admission.  His HGB is now rising because of fluid removal.    3.  HTN. BP is controlled. As we pull more fluid, he may need his antihypertensives tapered - starting with amlodipine.      4.  FEN.K 4.0     Plan:    OK for D/C from my standpoint.  Next HD Saturday and [place].          Interval History:   He feels well.  No muscle cramps.  No dizziness.  Eating ok.  Denies n/v/f/c.  No dizziness/lightheadedness/cramping.  No abd pain/cp/sob.      Dialysis Parameters:     Wt Readings from Last 4 Encounters:   03/15/18 84.2 kg (185 lb 10 oz)   02/01/18 87.8 kg (193 lb 9.6 oz)   02/01/18 90.3 kg (199 lb 1.2 oz)   01/29/18 91.3 kg (201 lb 4.8 oz)     I/O last 3 completed shifts:  In: 810 [P.O.:810]  Out: 3011 [Other:3011]  BP Readings from Last 3 Encounters:   03/15/18 121/62   02/04/18 132/68   02/01/18 149/71       Routine, ONE TIME, Starting today For 1 Occurrences  Weight Loss (kg): 3-4  Dialysis Temp: 36.5  C  Access Device: AVF  Access Site: L arm  Dialyzer: Revaclear  Dialysis Bath: K 3  Blood Flow Rate (mL/min): 450  Total Treatment Time (hrs): 3.5  Heparin: none used         Medications and Allergies:   Reviewed in EPIC      amLODIPine (NORVASC) tablet 7.5 mg  7.5 mg Oral Daily     carvedilol  12.5 mg Oral BID w/meals     iopamidol  100 mL INTRA-ARTERIAL Once     iopamidol  100 mL INTRA-ARTERIAL Once     sodium chloride (PF)  3 mL Intracatheter Q8H     aspirin EC  81 mg Oral Daily     irbesartan  300 mg Oral At Bedtime     -  MEDICATION INSTRUCTIONS for Dialysis Patients -   Does not apply See Admin Instructions     hydrALAZINE (APRESOLINE) tablet 25 mg  25 mg Oral Q8H JAN     sodium chloride (PF)  3 mL Intracatheter Q8H     abacavir  600 mg Oral QPM     atorvastatin  40 mg Oral At Bedtime     clopidogrel (PLAVIX) tablet 75 mg  75 mg Oral QPM     darunavir  800 mg Oral At Bedtime     dolutegravir  50 mg Oral At Bedtime     gabapentin  300 mg Oral QAM     gabapentin  600 mg Oral QPM     insulin glargine  50 Units Subcutaneous Daily     isosorbide mononitrate  120 mg Oral QPM     magnesium oxide (MAG-OX) tablet 400 mg  400 mg Oral At Bedtime     NEPRO   Oral BID     omega-3 acid ethyl esters  2 g Oral BID     omeprazole  40 mg Oral Daily     ritonavir  100 mg Oral At Bedtime     dapsone  100 mg Oral At Bedtime     insulin aspart  10 Units Subcutaneous TID w/meals     insulin aspart  1-7 Units Subcutaneous TID AC     insulin aspart  1-5 Units Subcutaneous At Bedtime     NEPHROCAPS  1 capsule Oral Daily     sodium chloride 0.9%, albumin human, albumin human, lidocaine (buffered or not buffered), sodium chloride (PF), HOLD MEDICATION, HYDROcodone-acetaminophen, labetalol, - MEDICATION INSTRUCTIONS -, hypromellose-dextran, - MEDICATION INSTRUCTIONS -, lidocaine 4%, nitroGLYcerin, clonazePAM (klonoPIN) tablet 0.5 mg, ipratropium - albuterol 0.5 mg/2.5 mg/3 mL, glucose **OR** dextrose **OR** glucagon, naloxone, melatonin, acetaminophen, senna-docusate **OR** senna-docusate, polyethylene glycol, ondansetron **OR** ondansetron, Continuing ACE inhibitor/ARB/ARNI from home medication list OR ACE inhibitor/ARB order already placed during this visit, - MEDICATION INSTRUCTIONS -     Allergies   Allergen Reactions     Calcium Acetate Other (See Comments)     Other reaction(s): Other, see comments  Pain in chest and back  Pain in chest area (sensitive skin)      Diagnostic X-Ray Materials Other (See Comments)     PN: renal failure     Lisinopril       Sulfa Drugs               Labs:     BMP  Recent Labs  Lab 03/14/18  0829 03/13/18  0609 03/12/18  0748 03/11/18  1243    130* 134 135   POTASSIUM 4.0 4.8 3.8 4.0   CHLORIDE 98 94 97 99   PRABHA 9.3 9.2 9.3 8.9   CO2 28 27 30 27   BUN 39* 46* 32* 37*   CR 4.89* 6.17* 4.73* 5.14*   * 75 124* 134*     CBC  Recent Labs  Lab 03/15/18  0707 03/14/18  0829 03/13/18  0609 03/12/18  0748  03/10/18  0730 03/09/18  0709   WBC  --   --  6.3 6.1  --  5.8 6.1   HGB 10.1* 9.6* 9.7* 9.1*  < > 7.1* 7.5*   HCT  --   --  31.3* 29.3*  --  23.3* 25.1*   MCV  --   --  95 94  --  94 97   PLT  --   --  160 155  --  160 174   < > = values in this interval not displayed.  Lab Results   Component Value Date    AST 17 03/10/2018    ALT 17 03/10/2018    ALKPHOS 89 03/10/2018    BILITOTAL 0.4 03/10/2018    BILICONJ 0.0 07/06/2014            Physical Exam:   Vitals were reviewed in [facility]    Wt Readings from Last 3 Encounters:   03/15/18 84.2 kg (185 lb 10 oz)   02/01/18 87.8 kg (193 lb 9.6 oz)   02/01/18 90.3 kg (199 lb 1.2 oz)       Intake/Output Summary (Last 24 hours) at 03/15/18 1219  Last data filed at 03/15/18 0750   Gross per 24 hour   Intake              960 ml   Output                0 ml   Net              960 ml       GENERAL APPEARANCE: pleasant, no distress, a & o  HEENT:  Eyes/ears/nose grossly normal, neck supple  RESP: lungs clear to auscultation with good efforts, no crackles, rhonchi or wheezes  CV: regular rate and rhythm, normal S1 S2, no murmur, click or rub   ABDOMEN: soft, nontender, bowel sounds normal  EXTREMITIES/SKIN: no edema, no rashes or lesions     Pt seen on dialysis.  Stable run.  Good BFR.      Attestation:  I have reviewed today's vital signs, notes, medications, labs and imaging.     Rudolph Wilkes MD  [facility] - Nephrology  [phone]

## 2018-03-15 NOTE — PLAN OF CARE
Problem: Patient Care Overview  Goal: Plan of Care/Patient Progress Review  Outcome: Improving  Patient alert and oriented, up with assist of one with a cane. Patient will have an extra run in dialysis this morning, AV fistula in left arm, bruit present dressing clean dry and intact. AM hydralazine held for dialysis. Denies shortness of breath and chest pain. Blood sugar this shift; 200. Sating at 91% on room air, vital signs stable. Will continue POC.

## 2018-03-15 NOTE — PLAN OF CARE
Problem: Patient Care Overview  Goal: Plan of Care/Patient Progress Review  Outcome: Improving  A/O cooperative. Had dialysis AM shift. Up in chair most of this shift. Ambulated in room SBA with cane. Still c/o dizziness with activity. VSS, LS clear. Tele SR with peaked T's. No c/o chest pain, sob, no distress.  at dinner time and 142 at bed time. Plan for additional HD tomorrow.

## 2018-03-15 NOTE — PROGRESS NOTES
Potassium   Date Value Ref Range Status   03/14/2018 4.0 3.4 - 5.3 mmol/L Final     Lab Results   Component Value Date    HGB 10.1 03/15/2018     Weight: 84.2 kg (185 lb 10 oz)  POST WT  DIALYSIS PROCEDURE NOTE  Hepatitis status of previous patient on machine log was checked and verified ok to use with this patients hepatitis status.  Patient dialyzed for 3.5 hrs. on a 3 K bath with a net fluid removal of  3L.  A BFR of 450 ml/min was obtained via a LUE AVF using 15 gauge needles.    The patient was seen by Dr. Wilkes during the treatment.  Total heparin received during the treatment: 0 units. Sites held x 10 min then  pressure drsgs applied.  Meds  Given: Epogen 12,000 units Complications: None. Procedure and ESRD teaching done and questions answered. See flowsheet in EPIC for further details and post assessment.  Machine water alarm in place and functioning.  Pt returned via WC  Vascular Access: Aseptic prep done for both on/off.  Report received from: WILLIS Hinojosa RN  Report given to: WILLIS Hinojosa RN  HEPATITIS B SURFACE ANTIGEN Non-Reactive DATE 1/20/18   HEPATITIS B SURFACE ANTIBODY Immune DATE 1/20/18  Chlorine/Chloramine water system checked every 4 hours.  Outpatient Dialysis at [facility]

## 2018-03-16 VITALS
RESPIRATION RATE: 18 BRPM | WEIGHT: 175.93 LBS | TEMPERATURE: 96.4 F | DIASTOLIC BLOOD PRESSURE: 65 MMHG | OXYGEN SATURATION: 92 % | BODY MASS INDEX: 24.63 KG/M2 | SYSTOLIC BLOOD PRESSURE: 117 MMHG | HEIGHT: 71 IN

## 2018-03-16 LAB
GLUCOSE BLDC GLUCOMTR-MCNC: 102 MG/DL (ref 70–99)
GLUCOSE BLDC GLUCOMTR-MCNC: 130 MG/DL (ref 70–99)
GLUCOSE BLDC GLUCOMTR-MCNC: 133 MG/DL (ref 70–99)
GLUCOSE BLDC GLUCOMTR-MCNC: 177 MG/DL (ref 70–99)

## 2018-03-16 PROCEDURE — 00000146 ZZHCL STATISTIC GLUCOSE BY METER IP

## 2018-03-16 PROCEDURE — 25000132 ZZH RX MED GY IP 250 OP 250 PS 637: Mod: GY | Performed by: NURSE PRACTITIONER

## 2018-03-16 PROCEDURE — 25000132 ZZH RX MED GY IP 250 OP 250 PS 637: Mod: GY | Performed by: PHYSICIAN ASSISTANT

## 2018-03-16 PROCEDURE — 25000132 ZZH RX MED GY IP 250 OP 250 PS 637: Mod: GY | Performed by: INTERNAL MEDICINE

## 2018-03-16 PROCEDURE — A9270 NON-COVERED ITEM OR SERVICE: HCPCS | Mod: GY | Performed by: NURSE PRACTITIONER

## 2018-03-16 PROCEDURE — A9270 NON-COVERED ITEM OR SERVICE: HCPCS | Mod: GY | Performed by: PHYSICIAN ASSISTANT

## 2018-03-16 PROCEDURE — A9270 NON-COVERED ITEM OR SERVICE: HCPCS | Mod: GY | Performed by: INTERNAL MEDICINE

## 2018-03-16 PROCEDURE — 25000131 ZZH RX MED GY IP 250 OP 636 PS 637: Mod: GY | Performed by: INTERNAL MEDICINE

## 2018-03-16 PROCEDURE — 99239 HOSP IP/OBS DSCHRG MGMT >30: CPT | Performed by: INTERNAL MEDICINE

## 2018-03-16 RX ORDER — CARVEDILOL 12.5 MG/1
12.5 TABLET ORAL 2 TIMES DAILY WITH MEALS
Qty: 60 TABLET | Refills: 0 | Status: ON HOLD | OUTPATIENT
Start: 2018-03-16 | End: 2019-01-01

## 2018-03-16 RX ORDER — IRBESARTAN 300 MG/1
300 TABLET ORAL AT BEDTIME
Qty: 30 TABLET | Refills: 0 | Status: ON HOLD | OUTPATIENT
Start: 2018-03-16 | End: 2020-01-01

## 2018-03-16 RX ORDER — HYDRALAZINE HYDROCHLORIDE 25 MG/1
25 TABLET, FILM COATED ORAL 3 TIMES DAILY
Qty: 90 TABLET | Refills: 0 | Status: ON HOLD | OUTPATIENT
Start: 2018-03-16 | End: 2018-12-06

## 2018-03-16 RX ORDER — AMLODIPINE BESYLATE 5 MG/1
7.5 TABLET ORAL DAILY
Qty: 45 TABLET | Refills: 0 | Status: SHIPPED | OUTPATIENT
Start: 2018-03-16 | End: 2018-05-01

## 2018-03-16 RX ADMIN — GABAPENTIN 300 MG: 300 CAPSULE ORAL at 08:14

## 2018-03-16 RX ADMIN — Medication 1 CAPSULE: at 08:14

## 2018-03-16 RX ADMIN — HYDRALAZINE HYDROCHLORIDE 25 MG: 25 TABLET ORAL at 06:03

## 2018-03-16 RX ADMIN — INSULIN ASPART 10 UNITS: 100 INJECTION, SOLUTION INTRAVENOUS; SUBCUTANEOUS at 08:18

## 2018-03-16 RX ADMIN — AMLODIPINE BESYLATE 7.5 MG: 2.5 TABLET ORAL at 08:15

## 2018-03-16 RX ADMIN — CARVEDILOL 12.5 MG: 12.5 TABLET, FILM COATED ORAL at 08:15

## 2018-03-16 RX ADMIN — OMEGA-3-ACID ETHYL ESTERS 2 G: 1 CAPSULE, LIQUID FILLED ORAL at 08:14

## 2018-03-16 RX ADMIN — ASPIRIN 81 MG: 81 TABLET, COATED ORAL at 08:15

## 2018-03-16 RX ADMIN — INSULIN GLARGINE 45 UNITS: 100 INJECTION, SOLUTION SUBCUTANEOUS at 12:08

## 2018-03-16 RX ADMIN — INSULIN ASPART 10 UNITS: 100 INJECTION, SOLUTION INTRAVENOUS; SUBCUTANEOUS at 12:47

## 2018-03-16 RX ADMIN — Medication: at 08:17

## 2018-03-16 ASSESSMENT — ACTIVITIES OF DAILY LIVING (ADL)
ADLS_ACUITY_SCORE: 10

## 2018-03-16 NOTE — PLAN OF CARE
Problem: Patient Care Overview  Goal: Plan of Care/Patient Progress Review  Occupational Therapy Discharge Summary    Reason for therapy discharge:    Discharged to home.    Progress towards therapy goal(s). See goals on Care Plan in [facility] electronic health record for goal details.  Goals partially met.  Barriers to achieving goals:   discharge from facility.    Therapy recommendation(s):    No further therapy is recommended.     **This patient was not seen by writing OT, information for discharge summary taken from treating OT's notes.**

## 2018-03-16 NOTE — PLAN OF CARE
Problem: Patient Care Overview  Goal: Plan of Care/Patient Progress Review  Outcome: Adequate for Discharge Date Met: 03/16/18  Patient's After Visit Summary was reviewed with patient and spouse.   Patient verbalized understanding of After Visit Summary, recommended follow up and was given an opportunity to ask questions. Writer provided education on [facility]. Pt verbalized understanding and had no further questions/concerns at time of D/C.  Discharge medications sent home with patient/family: No, prescriptions sent to pt's home pharmacy   Discharged with spouse at 1345.

## 2018-03-16 NOTE — PLAN OF CARE
"Problem: Patient Care Overview  Goal: Plan of Care/Patient Progress Review  Outcome: Improving  Vitals: /63 (BP Location: Right arm)  Temp 97.9  F (36.6  C) (Oral)  Resp 18  Ht 1.803 m (5' 11\")  Wt 79.8 kg (175 lb 14.8 oz)  SpO2 94%  BMI 24.54 kg/m2  Tele: NSR  Pain: Pt denies pain at this time  LOC: Alert and oriented  Mobility: Up with stand by assist and cane  : Pt on hemo dialysis  GI:bowel sounds active   IV: Saline locked  Plan: Pt plans to discharge today.        "

## 2018-03-16 NOTE — PROVIDER NOTIFICATION
Bedtime BG 63. Juice and a sandwich given. Pt alert and feels good. MD notified.     MD modified future Lantus order. Recheck BG in at 0030

## 2018-03-16 NOTE — DISCHARGE SUMMARY
[facility]  Discharge Summary  Name: Donald Raza    MRN: [id number]  YOB: 1950    Age: 68 year old  Date of Discharge:  3/16/2018  1:40 PM  Date of Admission: 3/8/2018  Primary Care Provider: Aida Thomas  Discharge Physician:  Clemencia Pal MD  Discharging Service:  Hospitalist      Discharge Diagnoses:  1. NSTEMI  2.  Hypoxic respiratory failure  3.  Acute diastolic CHF exacerbation  4.  Uncontrolled hypertension  5.  Anemia  6.  Diabetes mellitus on insulin  7.  HIV  8.  Hyperlipidemia  9.  Anxiety  10.  End-stage renal disease on hemodialysis     Hospital Course:  Donald Raza is a 68 year old male with a history of CAD with prior stenting to the LAD, preserved EF (55-60 % by echo in 12/2017), ESRD on HD, T2dm, HIV on therapy, HTN/HLD who was admitted on 3/8/2018 with CP and SOB with e/o hypoxic respiratory failure with sats 83 % requiring short term BiPAP rescue therapy, and persistent CP necessitating NTG gtt. He had mildly elevated trops.      Cardiology followed throughout admission.  Of note most recent angio completed 10/2017 showing no lesions that would require intervention and plan was for medical management but given persistence of symptoms underwent angio am 3/12/18 showing no change so no intervention required.      His hospitalization was notable for difficult to control hypertension in part do to fluid retention, and ongoing troubles with anemia of unclear etiology necessitating 4 U PRBC transfusion.       ACS/NSTEMI:  With mildly elevated troponin in setting of ESRD-peaked at 0.074.  Seen in consultation by cardiology.  Did undergo coronary angiogram on 3/12/18.  No change noted on angiogram from previous.  Continue with medical management.  Continue aspirin, atorvastatin, carvedilol, clopidogrel, Imdur, and irbesartan.    Hypoxic respiratory failure-due to diastolic HF with exacerbation in the setting of uncontrolled hypertension.  Partly due to volume  overload and needs new dry weight.  Nephrology following (will discuss with outpatient nephrologist).    Last dialyzed on 3/15/18, will resume his regular hemodialysis schedule upon discharge.  His hypoxic respiratory failure had resolved by time of discharge.  He does have a lower dry weight and prior to admission.    HTN:  Uncontrolled with BPs ranging from 150-210's initially, this improved with titration of his medication regimen. Continue Imdur, irbesartan, carvedilol, hydralazine, and amlodipine.    Anemia: chronic-baseline Hgb seems to be 8-9 range, but has had variable hgbs over the past year sometimes dipping into the 5 range.  Had screening EGD and colonoscopy in 2/17 and 5/17 respectively negative for bleeding. Denies any bleeding recently or this admission.     With his underlying cardiac issues, goal should be to keep his hemoglobin above 8.  Is on IV iron with HD rec'd  1 U PRBC on admission for Hgb 7.1- with good response to 9.2, but then 7.1 again within 48 hours.  So transfused additional unit on HD 3/10/18 for a total of 2 U.  Response only to upper 7 range and so transfused 2 u while on HD pre angio 3/11/18 and now Hgb in low 9 range.  EPO level back and WNL. Total transfusion thus far 4 U.  Consider heme eval in OP setting if recurs or fails to resolve.  Hemoglobin remained stable prior to discharge.    Diabetes Mellitus.  Continue Lantus, scheduled Novolog, and Novolog SSI.      HIV with chronically low CD4 150-200 range consistent with AIDs:  Cont home regimen of antivirals    HLP:   Continue atorvastatin.    Anxiety:   Clonazepam as needed.    ESRD:  Appreciate nephrology input-ESRD typically Tuesday/Thursday/Saturday.  Patient now has a lower dry weight.  Nephrology will speak with outpatient nephrologist.    # Discharge Pain Plan:   - Patient currently has NO PAIN and is not being prescribed pain medications on discharge.         Discharge Disposition:  Discharged to home  "    Allergies:  Allergies   Allergen Reactions     Calcium Acetate Other (See Comments)     Other reaction(s): Other, see comments  Pain in chest and back  Pain in chest area (sensitive skin)      Diagnostic X-Ray Materials Other (See Comments)     PN: renal failure     Lisinopril      Sulfa Drugs         Condition on Discharge:  Discharge condition: Good   Discharge vitals: Blood pressure 117/65, temperature 96.4  F (35.8  C), temperature source Oral, resp. rate 18, height 1.803 m (5' 11\"), weight 79.8 kg (175 lb 14.8 oz), SpO2 92 %.   Code status on discharge: Full Code     History of Illness:  See detailed admission note for full details.    Physical Exam:  Blood pressure 117/65, temperature 96.4  F (35.8  C), temperature source Oral, resp. rate 18, height 1.803 m (5' 11\"), weight 79.8 kg (175 lb 14.8 oz), SpO2 92 %.  Wt Readings from Last 1 Encounters:   03/16/18 79.8 kg (175 lb 14.8 oz)     General: Alert, awake, no acute distress.  HEENT: Normocephalic, atraumatic, eyes anicteric and without scleral injection, EOMI, MMM.  Cardiac: RRR, normal S1, S2.  Flow murmur due to fistula, no g/r. No LE edema.  Pulmonary: Normal chest rise, normal work of breathing.  Lungs CTAB without crackles or wheezing  Abdomen: soft, non-tender, non-distended.  Normoactive BS.  No guarding or rebound tenderness.  Extremities: no deformities.  Warm, well perfused.  Skin: no rashes or lesions noted.  Warm and Dry.  Neuro: No focal deficits noted.  Speech clear.  Coordination and strength grossly normal.  Psych: Appropriate affect. Alert and oriented x3    Procedures other than Imaging:  HD  BiPAP  TTE  Angiogram  Conclusion:   1. Cardiomyopathy with ejection fraction of 30% and a left ventricular  end-diastolic pressure 40 mmHg.  2. Previously placed stents are widely patent. FFR was performed in  October and showed both the diagonal and distal left anterior  descending artery to have normal FFR measurements.  3. 50% ostial diagonal " stenosis and 50% stenosis in the lateral branch  of the bifurcating diagonal. There is a 50-70%stenosis in the small  caliber medial branch.    Recommendations: Continued aggressive medical management and risk  factor modification. Patient needs to be very well dialyzed and good  control of his weight. He needs to eat a low-salt diet. He needs  excellent control of blood pressure.     Imaging:  Results for orders placed or performed during the hospital encounter of 03/08/18   Chest  XR, 1 view PORTABLE    Narrative    CHEST SINGLE VIEW PORTABLE  3/8/2018 5:54 AM     HISTORY: Shortness of breath.    COMPARISON: 2/1/2018.    FINDINGS: Mildly increased interstitial opacities in both lungs.  Borderline cardiomegaly. Atherosclerotic calcification in the thoracic  aorta. A vascular stent is present in the left axillary region.      Impression    IMPRESSION: Mildly increased interstitial opacities in both lungs,  suspicious for pulmonary edema.    EN KINSEY MD   XR Chest Port 1 View    Narrative    CHEST SINGLE VIEW PORTABLE  3/9/2018 12:47 AM     HISTORY: Shortness of breath.    COMPARISON: 3/8/2018 at 0548 hours.    FINDINGS: Moderately increased interstitial opacities throughout both  lungs, increased since the comparison study. The lungs are otherwise  clear. Normal-sized cardiac silhouette.      Impression    IMPRESSION: Moderately increased interstitial opacities throughout  both lungs, increased since the comparison study dated 3/8/2018 at  0548 hours. These findings likely relate to worsening pulmonary edema.  An infectious or inflammatory process could have a similar appearance.    EN KINSEY MD        Consultations:  Consultations This Hospital Stay   CORE CLINIC EVALUATION IP CONSULT  CARDIAC REHAB IP CONSULT  CARE COORDINATOR IP CONSULT  NUTRITION SERVICES ADULT IP CONSULT  NEPHROLOGY IP CONSULT  CARDIOLOGY IP CONSULT  PHARMACY TO DOSE HEPARIN  PHARMACY IP CONSULT  PHARMACY IP CONSULT  SMOKING  CESSATION PROGRAM IP CONSULT     Recent Lab Results:    Recent Labs  Lab 03/15/18  0707 03/14/18  0829 03/13/18  0609 03/12/18  0748  03/10/18  0730   WBC  --   --  6.3 6.1  --  5.8   HGB 10.1* 9.6* 9.7* 9.1*  < > 7.1*   HCT  --   --  31.3* 29.3*  --  23.3*   MCV  --   --  95 94  --  94   PLT  --   --  160 155  --  160   < > = values in this interval not displayed.    Recent Labs  Lab 03/14/18  0829 03/13/18  0609 03/12/18  0748    130* 134   POTASSIUM 4.0 4.8 3.8   CHLORIDE 98 94 97   CO2 28 27 30   ANIONGAP 9 9 7   * 75 124*   BUN 39* 46* 32*   CR 4.89* 6.17* 4.73*   GFRESTIMATED 12* 9* 12*   GFRESTBLACK 14* 11* 15*   PRABHA 9.3 9.2 9.3     No results for input(s): TROPONIN, TROPI, TROPR in the last 168 hours.    Invalid input(s): TROP, TROPONINIES       Pending Results:    Unresulted Labs Ordered in the Past 30 Days of this Admission     No orders found from 1/7/2018 to 3/9/2018.           Discharge Instructions and Follow-Up:   Discharge Orders     Reason for your hospital stay   You were hospitalized for low hemoglobin, difficulty breathing and chest pain.     Follow-up and recommended labs and tests    Follow up with your regular dialysis schedule.     Activity   Your activity upon discharge: activity as tolerated     Full Code     Diet   Follow this diet upon discharge: Orders Placed This Encounter     Snacks/Supplements Adult: Nepro Oral Supplement; With Meals     Fluid restriction 1500 ML FLUID     Dialysis Diet       Discharge Medications   Current Discharge Medication List      START taking these medications    Details   aspirin EC 81 MG EC tablet Take 1 tablet (81 mg) by mouth daily  Qty: 30 tablet, Refills: 0    Associated Diagnoses: Coronary artery disease, angina presence unspecified, unspecified vessel or lesion type, unspecified whether native or transplanted heart      irbesartan (AVAPRO) 300 MG tablet Take 1 tablet (300 mg) by mouth At Bedtime  Qty: 30 tablet, Refills: 0    Associated  Diagnoses: Hypertension secondary to other renal disorders      carvedilol (COREG) 12.5 MG tablet Take 1 tablet (12.5 mg) by mouth 2 times daily (with meals)  Qty: 60 tablet, Refills: 0    Associated Diagnoses: Hypertension secondary to other renal disorders         CONTINUE these medications which have CHANGED    Details   hydrALAZINE (APRESOLINE) 25 MG tablet Take 1 tablet (25 mg) by mouth 3 times daily  Qty: 90 tablet, Refills: 0    Associated Diagnoses: Hypertension secondary to other renal disorders      amLODIPine (NORVASC) 5 MG tablet Take 1.5 tablets (7.5 mg) by mouth daily  Qty: 45 tablet, Refills: 0    Associated Diagnoses: Hypertension secondary to other renal disorders         CONTINUE these medications which have NOT CHANGED    Details   ipratropium - albuterol 0.5 mg/2.5 mg/3 mL (DUONEB) 0.5-2.5 (3) MG/3ML neb solution Take 1 vial (3 mLs) by nebulization every 6 hours as needed for shortness of breath / dyspnea or wheezing  Qty: 90 vial, Refills: 1    Associated Diagnoses: Acute respiratory failure with hypoxia (H)      Acetaminophen (TYLENOL PO) Take by mouth as needed for mild pain or fever      guaiFENesin (MUCINEX) 600 MG 12 hr tablet Take by mouth as needed for congestion      omega-3 acid ethyl esters (LOVAZA) 1 G capsule Take 2 g by mouth 2 times daily      Isosorbide Mononitrate  MG TB24 Take 1 tablet (120 mg) by mouth every evening  Qty: 30 tablet, Refills: 11    Associated Diagnoses: Coronary artery disease involving native heart, angina presence unspecified, unspecified vessel or lesion type; Hypertension secondary to other renal disorders      insulin glargine (LANTUS) 100 UNIT/ML injection Inject 50 Units Subcutaneous daily Takes about noon      mycophenolate (GENERIC EQUIVALENT) 500 MG tablet Take 1 tablet (500 mg) by mouth 2 times daily On an empty stomach    Comments: HOLD until seen by pcp/ renal within a week  Associated Diagnoses: ESRD (end stage renal disease) on dialysis (H)       Insulin Lispro (HUMALOG PEN SC) Take 15 units TID and an additional 2 units if BG is over 150      calcium carbonate (TUMS) 500 MG chewable tablet Take 3 chew tab by mouth 4 times daily       B COMPLEX-C-FOLIC ACID PO Take 1 tablet by mouth At Bedtime       albuterol (PROAIR HFA/PROVENTIL HFA/VENTOLIN HFA) 108 (90 BASE) MCG/ACT Inhaler Inhale 2 puffs into the lungs every 6 hours as needed for shortness of breath / dyspnea or wheezing  Qty: 1 Inhaler, Refills: 1    Associated Diagnoses: Cough      Nutritional Supplements (NEPRO PO) 1-3 times daily      omeprazole (PRILOSEC) 40 MG capsule Take 40 mg by mouth daily 1 hour before dinner      order for DME Equipment being ordered: Other: 4WW  Treatment Diagnosis: Decreased activity tolerance  Qty: 1 each, Refills: 0    Associated Diagnoses: SOB (shortness of breath); Generalized muscle weakness      !! gabapentin (NEURONTIN) 300 MG capsule Take 600 mg by mouth every evening      imiquimod (ALDARA) 5 % cream Apply topically as needed      DOXERCALCIFEROL IV Inject 6 mcg into the vein three times a week (with dialysis)      CLONAZEPAM PO Take 0.5 mg by mouth nightly as needed for anxiety (restless legs)      CLOPIDOGREL BISULFATE PO Take 75 mg by mouth every evening       abacavir (ZIAGEN) 300 MG tablet Take 600 mg by mouth every evening       chlorhexidine (CHLORHEXIDINE) 0.12 % solution Rinse and gargle 15 ml by mouth twice a day as directed.      terbinafine (LAMISIL AT) 1 % cream Apply topically 2 times daily as needed       atorvastatin (LIPITOR) 40 MG tablet Take 40 mg by mouth At Bedtime      epoetin brittni (EPOGEN,PROCRIT) 06069 UNIT/ML injection Inject 11,000 Units Subcutaneous three times a week WITH DIALYSIS      iron sucrose (VENOFER) 20 MG/ML injection Inject 50 mg into the vein once a week WIth dialysis      MAGNESIUM OXIDE PO Take 400 mg by mouth At Bedtime      dolutegravir (TIVICAY) 50 MG tablet Take 50 mg by mouth At Bedtime      nitroglycerin  "(NITROSTAT) 0.4 MG SL tablet One tablet under the tongue every 5 minutes if needed for chest pain. May repeat every 5 minutes for a maximum of 3 doses in 15 minutes\"  Qty: 25 tablet, Refills: 3    Associated Diagnoses: Coronary artery disease due to lipid rich plaque; Status post coronary angioplasty      !! gabapentin (NEURONTIN) 300 MG capsule Take 300 mg by mouth every morning       dapsone 100 MG tablet Take 100 mg by mouth At Bedtime       Darunavir Ethanolate (PREZISTA PO) Take 800 mg by mouth At Bedtime.      ritonavir (NORVIR) 100 MG capsule Take 1 capsule by mouth At Bedtime       benzonatate (TESSALON) 100 MG capsule Take 1 capsule (100 mg) by mouth 3 times daily as needed for cough  Qty: 42 capsule, Refills: 0    Associated Diagnoses: RSV bronchiolitis       !! - Potential duplicate medications found. Please discuss with provider.      STOP taking these medications       losartan (COZAAR) 50 MG tablet Comments:   Reason for Stopping:             Time Spent on this Encounter   I, Clemencia Pal, personally saw the patient today and spent greater than 30 minutes discharging this patient.    Clemencia Pal MD    "

## 2018-03-16 NOTE — PLAN OF CARE
Problem: Patient Care Overview  Goal: Plan of Care/Patient Progress Review  Outcome: Improving  VSS, BP improved. A/O. SBA with cane. Dialysis. Tele SR. Anticipate possible discharge to home tomorrow.

## 2018-03-19 ENCOUNTER — CARE COORDINATION (OUTPATIENT)
Dept: CARDIOLOGY | Facility: CLINIC | Age: 68
End: 2018-03-19

## 2018-03-19 DIAGNOSIS — I25.10 CAD (CORONARY ARTERY DISEASE): Primary | ICD-10-CM

## 2018-03-19 NOTE — PROGRESS NOTES
Patient was evaluated by cardiology while inpatient for NSTEMI and CHF (patint underwent coronary angiogram on 3/12/18 that did not require any mechanical intervention). Called patient to discuss any post hospital d/c questions he may have, review medication changes, and confirm f/u appts.Patient denied any questions regarding new medications or changes to some of his current medications that he was taking prior to admission. Patient denied any SOB, chest pain, or light headedness. RN confirmed with patient that we would like him to schedule f/u apt with [facility] within two weeks. Patient advised to call clinic with any cardiac related questions or concerns. Patient verbalized understanding and agreed with plan. RN transferred patient to scheduling to arrange f/u apt with [facility].

## 2018-04-11 ENCOUNTER — OFFICE VISIT (OUTPATIENT)
Dept: CARDIOLOGY | Facility: CLINIC | Age: 68
End: 2018-04-11
Attending: INTERNAL MEDICINE
Payer: MEDICARE

## 2018-04-11 VITALS
OXYGEN SATURATION: 96 % | HEIGHT: 71 IN | HEART RATE: 61 BPM | BODY MASS INDEX: 26.46 KG/M2 | WEIGHT: 189 LBS | DIASTOLIC BLOOD PRESSURE: 50 MMHG | SYSTOLIC BLOOD PRESSURE: 90 MMHG

## 2018-04-11 DIAGNOSIS — I25.10 CORONARY ARTERY DISEASE INVOLVING NATIVE CORONARY ARTERY OF NATIVE HEART WITHOUT ANGINA PECTORIS: ICD-10-CM

## 2018-04-11 PROCEDURE — 99214 OFFICE O/P EST MOD 30 MIN: CPT | Performed by: NURSE PRACTITIONER

## 2018-04-11 RX ORDER — PREGABALIN 75 MG/1
CAPSULE ORAL DAILY
Qty: 90 CAPSULE | Status: ON HOLD | COMMUNITY
Start: 2018-04-11 | End: 2018-07-12

## 2018-04-11 NOTE — MR AVS SNAPSHOT
"              After Visit Summary   4/11/2018    Donald Raza    MRN: [id number]           Patient Information     Date Of Birth          1950        Visit Information        Provider Department      4/11/2018 1:10 PM Yuki Hinojosa APRN CNP [facility]        Today's Diagnoses     Coronary artery disease involving native coronary artery of native heart without angina pectoris           Follow-ups after your visit        Your next 10 appointments already scheduled     Aug 13, 2018 10:45 AM CDT   Return Visit with Arnoldo Diaz MD   [facility] ([facility])    [address]  [facility] [facility] [address] [phone]              Who to contact     If you have questions or need follow up information about today's clinic visit or your schedule please contact [facility] directly at [phone].  Normal or non-critical lab and imaging results will be communicated to you by MyChart, letter or phone within 4 business days after the clinic has received the results. If you do not hear from us within 7 days, please contact the clinic through [employer] or phone. If you have a critical or abnormal lab result, we will notify you by phone as soon as possible.  Submit refill requests through [employer] or call your pharmacy and they will forward the refill request to us. Please allow 3 business days for your refill to be completed.          Additional Information About Your Visit        MyChart Information     [employer] lets you send messages to your doctor, view your test results, renew your prescriptions, schedule appointments and more. To sign up, go to [email or web address] . Click on \"Log in\" on the left side of the screen, which will take you to the Welcome page. Then click on \"Sign up Now\" on the right side of the page.     You will be asked to enter the access code " "listed below, as well as some personal information. Please follow the directions to create your username and password.     Your access code is: [email or web address]  Expires: 2018  8:21 AM     Your access code will  in 90 days. If you need help or a new code, please call your [place] clinic or [phone].        Care EveryWhere ID     This is your Care EveryWhere ID. This could be used by other organizations to access your [place] medical records  [id number]        Your Vitals Were     Pulse Height Pulse Oximetry BMI (Body Mass Index)          61 1.803 m (5' 11\") 96% 26.36 kg/m2         Blood Pressure from Last 3 Encounters:   18 90/50   18 117/65   18 132/68    Weight from Last 3 Encounters:   18 85.7 kg (189 lb)   18 79.8 kg (175 lb 14.8 oz)   18 87.8 kg (193 lb 9.6 oz)              We Performed the Following     Follow-Up with Cardiac Advanced Practice Provider          Today's Medication Changes          These changes are accurate as of 18  2:03 PM.  If you have any questions, ask your nurse or doctor.               Stop taking these medicines if you haven't already. Please contact your care team if you have questions.     gabapentin 300 MG capsule   Commonly known as:  NEURONTIN   Stopped by:  Yuki Hinojosa, DEDRICK CNP                    Primary Care Provider Office Phone # Fax #    Aida Thomas [phone]       [address] [facility] [address]        Equal Access to Services     [facility]: Hadii liza rouse Sosue, waaxda luqadaha, qaybta kaalmafatoumata porter . So [facility] [phone].    ATENCIÓN: Si habla español, tiene a ayala disposición servicios gratuitos de asistencia lingüística. Llame al [phone].    We comply with applicable federal civil rights laws and [place] laws. We do not discriminate on the basis of race, color, national origin, age, disability, sex, " sexual orientation, or gender identity.            Thank you!     Thank you for choosing [facility]  for your care. Our goal is always to provide you with excellent care. Hearing back from our patients is one way we can continue to improve our services. Please take a few minutes to complete the written survey that you may receive in the mail after your visit with us. Thank you!             Your Updated Medication List - Protect others around you: Learn how to safely use, store and throw away your medicines at www.disposemymeds.org.          This list is accurate as of 4/11/18  2:03 PM.  Always use your most recent med list.                   Brand Name Dispense Instructions for use Diagnosis    abacavir 300 MG tablet    ZIAGEN     Take 600 mg by mouth every evening        albuterol 108 (90 Base) MCG/ACT Inhaler    PROAIR HFA/PROVENTIL HFA/VENTOLIN HFA    1 Inhaler    Inhale 2 puffs into the lungs every 6 hours as needed for shortness of breath / dyspnea or wheezing    Cough       amLODIPine 5 MG tablet    NORVASC    45 tablet    Take 1.5 tablets (7.5 mg) by mouth daily    Hypertension secondary to other renal disorders       aspirin 81 MG EC tablet     30 tablet    Take 1 tablet (81 mg) by mouth daily    Coronary artery disease, angina presence unspecified, unspecified vessel or lesion type, unspecified whether native or transplanted heart       atorvastatin 40 MG tablet    LIPITOR     Take 40 mg by mouth At Bedtime        B COMPLEX-C-FOLIC ACID PO      Take 1 tablet by mouth At Bedtime        benzonatate 100 MG capsule    TESSALON    42 capsule    Take 1 capsule (100 mg) by mouth 3 times daily as needed for cough    RSV bronchiolitis       calcium carbonate 500 MG chewable tablet    TUMS     Take 3 chew tab by mouth 4 times daily        carvedilol 12.5 MG tablet    COREG    60 tablet    Take 1 tablet (12.5 mg) by mouth 2 times daily (with meals)    Hypertension secondary  to other renal disorders       chlorhexidine 0.12 % solution    PERIDEX     Rinse and gargle 15 ml by mouth twice a day as directed.        CLONAZEPAM PO      Take 0.5 mg by mouth nightly as needed for anxiety (restless legs)        CLOPIDOGREL BISULFATE PO      Take 75 mg by mouth every evening        dapsone 100 MG tablet      Take 100 mg by mouth At Bedtime        dolutegravir 50 MG tablet    TIVICAY     Take 50 mg by mouth At Bedtime        DOXERCALCIFEROL IV      Inject 6 mcg into the vein three times a week (with dialysis)        epoetin brittni 77818 UNIT/ML injection    EPOGEN/PROCRIT     Inject 11,000 Units Subcutaneous three times a week WITH DIALYSIS        guaiFENesin 600 MG 12 hr tablet    MUCINEX     Take by mouth as needed for congestion        HUMALOG PEN SC      Take 15 units TID and an additional 2 units if BG is over 150        hydrALAZINE 25 MG tablet    APRESOLINE    90 tablet    Take 1 tablet (25 mg) by mouth 3 times daily    Hypertension secondary to other renal disorders       imiquimod 5 % cream    ALDARA     Apply topically as needed        insulin glargine 100 UNIT/ML injection    LANTUS     Inject 50 Units Subcutaneous daily Pt states takes 25 units BID 04/10/2018.   Takes about noon        ipratropium - albuterol 0.5 mg/2.5 mg/3 mL 0.5-2.5 (3) MG/3ML neb solution    DUONEB    90 vial    Take 1 vial (3 mLs) by nebulization every 6 hours as needed for shortness of breath / dyspnea or wheezing    Acute respiratory failure with hypoxia (H)       irbesartan 300 MG tablet    AVAPRO    30 tablet    Take 1 tablet (300 mg) by mouth At Bedtime    Hypertension secondary to other renal disorders       iron sucrose 20 MG/ML injection    VENOFER     Inject 50 mg into the vein once a week WIth dialysis        Isosorbide Mononitrate  MG Tb24     30 tablet    Take 1 tablet (120 mg) by mouth every evening    Coronary artery disease involving native heart, angina presence unspecified, unspecified vessel  "or lesion type, Hypertension secondary to other renal disorders       lamISIL AT 1 % cream   Generic drug:  terbinafine      Apply topically 2 times daily as needed        * LYRICA PO      Pt states taking this one (maybe not sure of name) instead of the gabapentin. 04/10/2018        * LYRICA 75 MG capsule   Generic drug:  pregabalin     90 capsule    Take by mouth daily        MAGNESIUM OXIDE PO      Take 400 mg by mouth At Bedtime        mycophenolate 500 MG tablet    GENERIC EQUIVALENT     Take 1 tablet (500 mg) by mouth 2 times daily On an empty stomach    ESRD (end stage renal disease) on dialysis (H)       NEPRO PO      1-3 times daily        nitroGLYcerin 0.4 MG sublingual tablet    NITROSTAT    25 tablet    One tablet under the tongue every 5 minutes if needed for chest pain. May repeat every 5 minutes for a maximum of 3 doses in 15 minutes\"    Coronary artery disease due to lipid rich plaque, Status post coronary angioplasty       omega-3 acid ethyl esters 1 g capsule    Lovaza     Take 2 g by mouth 2 times daily        order for DME     1 each    Equipment being ordered: Other: 4WW Treatment Diagnosis: Decreased activity tolerance    SOB (shortness of breath), Generalized muscle weakness       PREZISTA PO      Take 800 mg by mouth At Bedtime.        priLOSEC 40 MG capsule   Generic drug:  omeprazole      Take 40 mg by mouth daily 1 hour before dinner        ritonavir 100 MG capsule    NORVIR     Take 1 capsule by mouth At Bedtime        TYLENOL PO      Take by mouth as needed for mild pain or fever        * Notice:  This list has 2 medication(s) that are the same as other medications prescribed for you. Read the directions carefully, and ask your doctor or other care provider to review them with you.      "

## 2018-04-11 NOTE — PROGRESS NOTES
HPI and Plan:  #[id number]  See dictation    No orders of the defined types were placed in this encounter.      Orders Placed This Encounter   Medications     Pregabalin (LYRICA PO)     Sig: Pt states taking this one (maybe not sure of name) instead of the gabapentin. 04/10/2018     pregabalin (LYRICA) 75 MG capsule     Sig: Take by mouth daily     Dispense:  90 capsule       Medications Discontinued During This Encounter   Medication Reason     gabapentin (NEURONTIN) 300 MG capsule      gabapentin (NEURONTIN) 300 MG capsule          Encounter Diagnosis   Name Primary?     Coronary artery disease involving native coronary artery of native heart without angina pectoris        CURRENT MEDICATIONS:  Current Outpatient Prescriptions   Medication Sig Dispense Refill     Pregabalin (LYRICA PO) Pt states taking this one (maybe not sure of name) instead of the gabapentin. 04/10/2018       pregabalin (LYRICA) 75 MG capsule Take by mouth daily 90 capsule      aspirin EC 81 MG EC tablet Take 1 tablet (81 mg) by mouth daily 30 tablet 0     hydrALAZINE (APRESOLINE) 25 MG tablet Take 1 tablet (25 mg) by mouth 3 times daily 90 tablet 0     carvedilol (COREG) 12.5 MG tablet Take 1 tablet (12.5 mg) by mouth 2 times daily (with meals) 60 tablet 0     ipratropium - albuterol 0.5 mg/2.5 mg/3 mL (DUONEB) 0.5-2.5 (3) MG/3ML neb solution Take 1 vial (3 mLs) by nebulization every 6 hours as needed for shortness of breath / dyspnea or wheezing 90 vial 1     Acetaminophen (TYLENOL PO) Take by mouth as needed for mild pain or fever       guaiFENesin (MUCINEX) 600 MG 12 hr tablet Take by mouth as needed for congestion       benzonatate (TESSALON) 100 MG capsule Take 1 capsule (100 mg) by mouth 3 times daily as needed for cough 42 capsule 0     omega-3 acid ethyl esters (LOVAZA) 1 G capsule Take 2 g by mouth 2 times daily       Isosorbide Mononitrate  MG TB24 Take 1 tablet (120 mg) by mouth every evening 30 tablet 11     insulin glargine  (LANTUS) 100 UNIT/ML injection Inject 50 Units Subcutaneous daily Pt states takes 25 units BID 04/10/2018.   Takes about noon       mycophenolate (GENERIC EQUIVALENT) 500 MG tablet Take 1 tablet (500 mg) by mouth 2 times daily On an empty stomach       Insulin Lispro (HUMALOG PEN SC) Take 15 units TID and an additional 2 units if BG is over 150       calcium carbonate (TUMS) 500 MG chewable tablet Take 3 chew tab by mouth 4 times daily        albuterol (PROAIR HFA/PROVENTIL HFA/VENTOLIN HFA) 108 (90 BASE) MCG/ACT Inhaler Inhale 2 puffs into the lungs every 6 hours as needed for shortness of breath / dyspnea or wheezing 1 Inhaler 1     Nutritional Supplements (NEPRO PO) 1-3 times daily       omeprazole (PRILOSEC) 40 MG capsule Take 40 mg by mouth daily 1 hour before dinner       imiquimod (ALDARA) 5 % cream Apply topically as needed       DOXERCALCIFEROL IV Inject 6 mcg into the vein three times a week (with dialysis)       CLONAZEPAM PO Take 0.5 mg by mouth nightly as needed for anxiety (restless legs)       CLOPIDOGREL BISULFATE PO Take 75 mg by mouth every evening        abacavir (ZIAGEN) 300 MG tablet Take 600 mg by mouth every evening        chlorhexidine (CHLORHEXIDINE) 0.12 % solution Rinse and gargle 15 ml by mouth twice a day as directed.       terbinafine (LAMISIL AT) 1 % cream Apply topically 2 times daily as needed        atorvastatin (LIPITOR) 40 MG tablet Take 40 mg by mouth At Bedtime       epoetin brittni (EPOGEN,PROCRIT) 95604 UNIT/ML injection Inject 11,000 Units Subcutaneous three times a week WITH DIALYSIS       iron sucrose (VENOFER) 20 MG/ML injection Inject 50 mg into the vein once a week WIth dialysis       MAGNESIUM OXIDE PO Take 400 mg by mouth At Bedtime       dolutegravir (TIVICAY) 50 MG tablet Take 50 mg by mouth At Bedtime       nitroglycerin (NITROSTAT) 0.4 MG SL tablet One tablet under the tongue every 5 minutes if needed for chest pain. May repeat every 5 minutes for a maximum of 3 doses  "in 15 minutes\" 25 tablet 3     dapsone 100 MG tablet Take 100 mg by mouth At Bedtime        Darunavir Ethanolate (PREZISTA PO) Take 800 mg by mouth At Bedtime.       ritonavir (NORVIR) 100 MG capsule Take 1 capsule by mouth At Bedtime        amLODIPine (NORVASC) 5 MG tablet Take 1.5 tablets (7.5 mg) by mouth daily (Patient not taking: Reported on 4/11/2018) 45 tablet 0     irbesartan (AVAPRO) 300 MG tablet Take 1 tablet (300 mg) by mouth At Bedtime 30 tablet 0     B COMPLEX-C-FOLIC ACID PO Take 1 tablet by mouth At Bedtime        order for DME Equipment being ordered: Other: 4WW  Treatment Diagnosis: Decreased activity tolerance 1 each 0       ALLERGIES     Allergies   Allergen Reactions     Calcium Acetate Other (See Comments)     Other reaction(s): Other, see comments  Pain in chest and back  Pain in chest area (sensitive skin)      Diagnostic X-Ray Materials Other (See Comments)     PN: renal failure     Lisinopril      Sulfa Drugs        PAST MEDICAL HISTORY:  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, recurrent     Bradycardia 5/28/2016     CAD S/P percutaneous coronary angioplasty 6/15/2015     Chest pain      CKD (chronic kidney disease)     Hemodialysis     Dilated aortic root (H) 5/6/2016     ESRD (end stage renal disease) on dialysis (H) 5/6/2016     Hemodialysis-associated hypotension 5/22/2016     Human immunodeficiency virus (HIV) disease      Hypertension 2010     Hypotension, unspecified hypotension type 5/22/2016     Impotence of organic origin      Increased prostate specific antigen (PSA) velocity 08/08/2016    Awaiting bx on blood thinner     Mixed hyperlipidemia      Near syncope 2016    with hemodialysis     NSTEMI (non-ST elevated myocardial infarction) (H) 12/2015, 5/2016     Pulmonary HTN     Mod     TIA (transient ischemic attack) " 5/2016     Type 2 diabetes mellitus (H) age 50     Unstable angina (H) 3/4/2016       PAST SURGICAL HISTORY:  Past Surgical History:   Procedure Laterality Date     ANGIOGRAM  03-04-16    No culprit lesions, stents widely patent      ANGIOGRAM  05-06-16    Cutting balloon ptca=Diag     APPENDECTOMY  2000     CHOLECYSTECTOMY, LAPOROSCOPIC       COLOSTOMY  09/30/1999    Temporary for diverticulitis     HC LEFT HEART CATHETERIZATION  03/12/2018    No significant change  Elevated LVEDp  LVEF 30% No PCI     HEART CATH, ANGIOPLASTY  08-18-16    LAD PCI. Stented with a 3.0 x 8 mm Xience Alpine stent.     STENT, CORONARY, S660 15/18  12/2015    VANITA=Diag, PTCA=LAD     STENT, CORONARY, S660 15/18  06/2015    VANITA=LAD       FAMILY HISTORY:  Family History   Problem Relation Age of Onset     HEART DISEASE Brother 40     CABG     KIDNEY DISEASE Sister      Hypertension Sister      HEART DISEASE Brother      Dilated aorta       SOCIAL HISTORY:  Social History     Social History     Marital status:      Spouse name: N/A     Number of children: N/A     Years of education: N/A     Social History Main Topics     Smoking status: Former Smoker     Packs/day: 2.00     Years: 41.00     Types: Cigarettes     Quit date: 2003     Smokeless tobacco: Never Used     Alcohol use No     Drug use: No     Sexual activity: Not Asked     Other Topics Concern     Parent/Sibling W/ Cabg, Mi Or Angioplasty Before 65f 55m? Yes     Caffeine Concern Yes     2 cups daily     Sleep Concern Yes     Special Diet No      more proteins     Exercise Yes     Cardiac rehab      Social History Narrative       Review of Systems:  Skin:  Negative       Eyes:  Positive for glasses    ENT:  Negative      Respiratory:  Positive for dyspnea on exertion chest congestion   Cardiovascular:  Negative;palpitations;syncope or near-syncope   occ  Gastroenterology: Negative      Genitourinary:  Negative      Musculoskeletal:  Positive for arthritis    Neurologic:  Positive  "for numbness or tingling of feet    Psychiatric:  Negative      Heme/Lymph/Imm:  Negative      Endocrine:  Positive for diabetes;night sweats;hot flashes      Physical Exam:  Vitals: BP 90/50 (BP Location: Right arm, Patient Position: Sitting, Cuff Size: Adult Regular)  Pulse 61  Ht 1.803 m (5' 11\")  Wt 85.7 kg (189 lb)  SpO2 96%  BMI 26.36 kg/m2    Constitutional:           Skin:             Head:           Eyes:           Lymph:      ENT:           Neck:           Respiratory:            Cardiac:                                                           GI:           Extremities and Muscular Skeletal:                 Neurological:           Psych:             CC  Arnoldo Diaz MD  [address]  [place] [address]                  "

## 2018-04-11 NOTE — LETTER
4/11/2018    Cristina Ann Baker, MD [facility] [address] [facility] [id number]    RE: Donald Raza       Dear Colleague,    I had the pleasure of seeing Donald Raza in the [facility].    HPI and Plan:  #[id number]  See dictation    No orders of the defined types were placed in this encounter.      Orders Placed This Encounter   Medications     Pregabalin (LYRICA PO)     Sig: Pt states taking this one (maybe not sure of name) instead of the gabapentin. 04/10/2018     pregabalin (LYRICA) 75 MG capsule     Sig: Take by mouth daily     Dispense:  90 capsule       Medications Discontinued During This Encounter   Medication Reason     gabapentin (NEURONTIN) 300 MG capsule      gabapentin (NEURONTIN) 300 MG capsule          Encounter Diagnosis   Name Primary?     Coronary artery disease involving native coronary artery of native heart without angina pectoris        CURRENT MEDICATIONS:  Current Outpatient Prescriptions   Medication Sig Dispense Refill     Pregabalin (LYRICA PO) Pt states taking this one (maybe not sure of name) instead of the gabapentin. 04/10/2018       pregabalin (LYRICA) 75 MG capsule Take by mouth daily 90 capsule      aspirin EC 81 MG EC tablet Take 1 tablet (81 mg) by mouth daily 30 tablet 0     hydrALAZINE (APRESOLINE) 25 MG tablet Take 1 tablet (25 mg) by mouth 3 times daily 90 tablet 0     carvedilol (COREG) 12.5 MG tablet Take 1 tablet (12.5 mg) by mouth 2 times daily (with meals) 60 tablet 0     ipratropium - albuterol 0.5 mg/2.5 mg/3 mL (DUONEB) 0.5-2.5 (3) MG/3ML neb solution Take 1 vial (3 mLs) by nebulization every 6 hours as needed for shortness of breath / dyspnea or wheezing 90 vial 1     Acetaminophen (TYLENOL PO) Take by mouth as needed for mild pain or fever       guaiFENesin (MUCINEX) 600 MG 12 hr tablet Take by mouth as needed for congestion       benzonatate (TESSALON) 100 MG capsule Take 1 capsule (100 mg) by mouth 3 times daily as  needed for cough 42 capsule 0     omega-3 acid ethyl esters (LOVAZA) 1 G capsule Take 2 g by mouth 2 times daily       Isosorbide Mononitrate  MG TB24 Take 1 tablet (120 mg) by mouth every evening 30 tablet 11     insulin glargine (LANTUS) 100 UNIT/ML injection Inject 50 Units Subcutaneous daily Pt states takes 25 units BID 04/10/2018.   Takes about noon       mycophenolate (GENERIC EQUIVALENT) 500 MG tablet Take 1 tablet (500 mg) by mouth 2 times daily On an empty stomach       Insulin Lispro (HUMALOG PEN SC) Take 15 units TID and an additional 2 units if BG is over 150       calcium carbonate (TUMS) 500 MG chewable tablet Take 3 chew tab by mouth 4 times daily        albuterol (PROAIR HFA/PROVENTIL HFA/VENTOLIN HFA) 108 (90 BASE) MCG/ACT Inhaler Inhale 2 puffs into the lungs every 6 hours as needed for shortness of breath / dyspnea or wheezing 1 Inhaler 1     Nutritional Supplements (NEPRO PO) 1-3 times daily       omeprazole (PRILOSEC) 40 MG capsule Take 40 mg by mouth daily 1 hour before dinner       imiquimod (ALDARA) 5 % cream Apply topically as needed       DOXERCALCIFEROL IV Inject 6 mcg into the vein three times a week (with dialysis)       CLONAZEPAM PO Take 0.5 mg by mouth nightly as needed for anxiety (restless legs)       CLOPIDOGREL BISULFATE PO Take 75 mg by mouth every evening        abacavir (ZIAGEN) 300 MG tablet Take 600 mg by mouth every evening        chlorhexidine (CHLORHEXIDINE) 0.12 % solution Rinse and gargle 15 ml by mouth twice a day as directed.       terbinafine (LAMISIL AT) 1 % cream Apply topically 2 times daily as needed        atorvastatin (LIPITOR) 40 MG tablet Take 40 mg by mouth At Bedtime       epoetin brittni (EPOGEN,PROCRIT) 46333 UNIT/ML injection Inject 11,000 Units Subcutaneous three times a week WITH DIALYSIS       iron sucrose (VENOFER) 20 MG/ML injection Inject 50 mg into the vein once a week WIth dialysis       MAGNESIUM OXIDE PO Take 400 mg by mouth At Bedtime        "dolutegravir (TIVICAY) 50 MG tablet Take 50 mg by mouth At Bedtime       nitroglycerin (NITROSTAT) 0.4 MG SL tablet One tablet under the tongue every 5 minutes if needed for chest pain. May repeat every 5 minutes for a maximum of 3 doses in 15 minutes\" 25 tablet 3     dapsone 100 MG tablet Take 100 mg by mouth At Bedtime        Darunavir Ethanolate (PREZISTA PO) Take 800 mg by mouth At Bedtime.       ritonavir (NORVIR) 100 MG capsule Take 1 capsule by mouth At Bedtime        amLODIPine (NORVASC) 5 MG tablet Take 1.5 tablets (7.5 mg) by mouth daily (Patient not taking: Reported on 4/11/2018) 45 tablet 0     irbesartan (AVAPRO) 300 MG tablet Take 1 tablet (300 mg) by mouth At Bedtime 30 tablet 0     B COMPLEX-C-FOLIC ACID PO Take 1 tablet by mouth At Bedtime        order for DME Equipment being ordered: Other: 4WW  Treatment Diagnosis: Decreased activity tolerance 1 each 0       ALLERGIES     Allergies   Allergen Reactions     Calcium Acetate Other (See Comments)     Other reaction(s): Other, see comments  Pain in chest and back  Pain in chest area (sensitive skin)      Diagnostic X-Ray Materials Other (See Comments)     PN: renal failure     Lisinopril      Sulfa Drugs        PAST MEDICAL HISTORY:  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, recurrent     Bradycardia 5/28/2016     CAD S/P percutaneous coronary angioplasty 6/15/2015     Chest pain      CKD (chronic kidney disease)     Hemodialysis     Dilated aortic root (H) 5/6/2016     ESRD (end stage renal disease) on dialysis (H) 5/6/2016     Hemodialysis-associated hypotension 5/22/2016     Human immunodeficiency virus (HIV) disease      Hypertension 2010     Hypotension, unspecified hypotension type 5/22/2016     Impotence of organic origin      Increased prostate specific antigen (PSA) velocity " 08/08/2016    Awaiting bx on blood thinner     Mixed hyperlipidemia      Near syncope 2016    with hemodialysis     NSTEMI (non-ST elevated myocardial infarction) (H) 12/2015, 5/2016     Pulmonary HTN     Mod     TIA (transient ischemic attack) 5/2016     Type 2 diabetes mellitus (H) age 50     Unstable angina (H) 3/4/2016       PAST SURGICAL HISTORY:  Past Surgical History:   Procedure Laterality Date     ANGIOGRAM  03-04-16    No culprit lesions, stents widely patent      ANGIOGRAM  05-06-16    Cutting balloon ptca=Diag     APPENDECTOMY  2000     CHOLECYSTECTOMY, LAPOROSCOPIC       COLOSTOMY  09/30/1999    Temporary for diverticulitis     HC LEFT HEART CATHETERIZATION  03/12/2018    No significant change  Elevated LVEDp  LVEF 30% No PCI     HEART CATH, ANGIOPLASTY  08-18-16    LAD PCI. Stented with a 3.0 x 8 mm Xience Alpine stent.     STENT, CORONARY, S660 15/18  12/2015    VANITA=Diag, PTCA=LAD     STENT, CORONARY, S660 15/18  06/2015    VANITA=LAD       FAMILY HISTORY:  Family History   Problem Relation Age of Onset     HEART DISEASE Brother 40     CABG     KIDNEY DISEASE Sister      Hypertension Sister      HEART DISEASE Brother      Dilated aorta       SOCIAL HISTORY:  Social History     Social History     Marital status:      Spouse name: N/A     Number of children: N/A     Years of education: N/A     Social History Main Topics     Smoking status: Former Smoker     Packs/day: 2.00     Years: 41.00     Types: Cigarettes     Quit date: 2003     Smokeless tobacco: Never Used     Alcohol use No     Drug use: No     Sexual activity: Not Asked     Other Topics Concern     Parent/Sibling W/ Cabg, Mi Or Angioplasty Before 65f 55m? Yes     Caffeine Concern Yes     2 cups daily     Sleep Concern Yes     Special Diet No      more proteins     Exercise Yes     Cardiac rehab      Social History Narrative       Review of Systems:  Skin:  Negative       Eyes:  Positive for glasses    ENT:  Negative      Respiratory:   "Positive for dyspnea on exertion chest congestion   Cardiovascular:  Negative;palpitations;syncope or near-syncope   occ  Gastroenterology: Negative      Genitourinary:  Negative      Musculoskeletal:  Positive for arthritis    Neurologic:  Positive for numbness or tingling of feet    Psychiatric:  Negative      Heme/Lymph/Imm:  Negative      Endocrine:  Positive for diabetes;night sweats;hot flashes      Physical Exam:  Vitals: BP 90/50 (BP Location: Right arm, Patient Position: Sitting, Cuff Size: Adult Regular)  Pulse 61  Ht 1.803 m (5' 11\")  Wt 85.7 kg (189 lb)  SpO2 96%  BMI 26.36 kg/m2    Constitutional:           Skin:             Head:           Eyes:           Lymph:      ENT:           Neck:           Respiratory:            Cardiac:                                                           GI:           Extremities and Muscular Skeletal:                 Neurological:           Psych:             CC  Arnoldo Diaz MD  [address]  [place] [id number]                    Thank you for allowing me to participate in the care of your patient.      Sincerely,     DEDRICK Fuller [facility]    cc:   Arnoldo Diaz MD  [address]  [place] [id number]        "

## 2018-04-11 NOTE — LETTER
4/11/2018      Cristina Ann Baker, MD [facility] [address] [facility] [address]      RE: Donald Raza       Dear Colleague,    I had the pleasure of seeing Donald Raza in the [facility].    Service Date: 04/11/2018      HISTORY OF PRESENT ILLNESS:  This 68-year-old male presents to [facility] today for a followup visit.  He is a patient of Dr. Diaz seen in our clinic for coronary artery disease, end-stage renal disease, hypertension, insulin-dependent diabetes, mixed hyperlipidemia and HIV positive.      Donald has a complex cardiovascular history.  Originally he suffered a myocardial infarction and underwent stenting to his LAD in 2015.  Since then he has undergone stenting to an ostial diagonal vessel, repeat angioplasty to an LAD, and multiple stenting to his diagonal vessel due to in-stent restenosis, last being in 08/2016.  Since then he has had issues with chest pain and has undergone repeat coronary angiography, in which no PCIs have been done.   Donald has been admitted to the hospital with resting chest pain on 03/08/2018.  He was noted to have a CHF exacerbation, quite hypertensive and significant anemia with hemoglobin of 7.1.  He has known end-stage renal disease and did undergo dialysis.  His troponin level peaked at 0.07.  There were no ST changes.  He underwent repeat coronary angiography showing no significant change, but elevated left ventricular end-diastolic pressure and an ejection fraction of 30%.  There was no PCI performed.  His symptoms did improve with hemodialysis and they lowered his dry weight and subsequently he has undergone more frequent hemodialysis in the outpatient setting.  His anemia was corrected with 4 units of packed red blood cells.  His antihypertensive agents were intensified by increasing his hydralazine and changing his losartan to irbesartan.  An echocardiogram showed preserved LV  "function without any regional wall motion abnormalities, normal right ventricular function and mild mitral regurgitation.  He returns today for reassessment.      Donald tells me he has been fairly stable over the last month.  He has had 1 episode of chest pain that occurred during dialysis which was quickly relieved with nitroglycerin.  Other than that, he has been free from any angina symptoms.  He has not been particularly short of breath.  His main complaint is a lot of fatigue that he experiences after his hemodialysis.  He does feel like they are \"pulling off too much fluid.\"  He denies any significant shortness of breath or peripheral edema.  He has not had any sensations of palpitations, lightheadedness or near-syncope.  He does monitor his blood pressure at home and tells me his systolic blood pressure is ranging between 135 mmHg to 90 mmHg.      PHYSICAL EXAMINATION:  His blood pressure today is 90/50, heart rate is 61 beats per minute and is regular.  His lungs are clear.  There is no peripheral edema.  Weight now at 189 pounds which is down greater than 10 pounds since the last time he was here.      IMPRESSION AND PLAN:   1.  Coronary artery disease.  History of multiple myocardial infarctions and stenting to the mid-LAD and multiple stenting to diagonal vessel due to in-stent restenosis.  Recent hospitalizations for chest pain were related to significant anemia and heart failure exacerbations.  Since then, he has received multiple blood transfusions and increased frequency of dialysis with a lower dry weight.  He has now been stable for the last month.  We will continue current medical regimen.  He has not had significant chest pain.   2.  End-stage renal disease on hemodialysis.   3.  Hypertension.  He is on multiple agents to control his blood pressure.  He does follow closely with Nephrology.   4.  Insulin-dependent diabetes.   5.  Treated mixed hyperlipidemia.      Thank you for allowing me to " participate in this patient's care.  He will follow up in about 6 months with Dr. Diaz.         DEDRICK STEVENS CNP             D: 2018   T: 2018   MT: JOHN      Name:     GREGORIO BRUSH   MRN:      [id number]        Account:      [id number]   :      1950           Service Date: 2018      Document: [id number]           Outpatient Encounter Prescriptions as of 2018   Medication Sig Dispense Refill     Pregabalin (LYRICA PO) Pt states taking this one (maybe not sure of name) instead of the gabapentin. 04/10/2018       pregabalin (LYRICA) 75 MG capsule Take by mouth daily 90 capsule      aspirin EC 81 MG EC tablet Take 1 tablet (81 mg) by mouth daily 30 tablet 0     hydrALAZINE (APRESOLINE) 25 MG tablet Take 1 tablet (25 mg) by mouth 3 times daily 90 tablet 0     carvedilol (COREG) 12.5 MG tablet Take 1 tablet (12.5 mg) by mouth 2 times daily (with meals) 60 tablet 0     ipratropium - albuterol 0.5 mg/2.5 mg/3 mL (DUONEB) 0.5-2.5 (3) MG/3ML neb solution Take 1 vial (3 mLs) by nebulization every 6 hours as needed for shortness of breath / dyspnea or wheezing 90 vial 1     Acetaminophen (TYLENOL PO) Take by mouth as needed for mild pain or fever       guaiFENesin (MUCINEX) 600 MG 12 hr tablet Take by mouth as needed for congestion       benzonatate (TESSALON) 100 MG capsule Take 1 capsule (100 mg) by mouth 3 times daily as needed for cough 42 capsule 0     omega-3 acid ethyl esters (LOVAZA) 1 G capsule Take 2 g by mouth 2 times daily       Isosorbide Mononitrate  MG TB24 Take 1 tablet (120 mg) by mouth every evening 30 tablet 11     insulin glargine (LANTUS) 100 UNIT/ML injection Inject 50 Units Subcutaneous daily Pt states takes 25 units BID 04/10/2018.   Takes about noon       mycophenolate (GENERIC EQUIVALENT) 500 MG tablet Take 1 tablet (500 mg) by mouth 2 times daily On an empty stomach       Insulin Lispro (HUMALOG PEN SC) Take 15 units TID and an additional 2 units if BG  "is over 150       calcium carbonate (TUMS) 500 MG chewable tablet Take 3 chew tab by mouth 4 times daily        albuterol (PROAIR HFA/PROVENTIL HFA/VENTOLIN HFA) 108 (90 BASE) MCG/ACT Inhaler Inhale 2 puffs into the lungs every 6 hours as needed for shortness of breath / dyspnea or wheezing 1 Inhaler 1     Nutritional Supplements (NEPRO PO) 1-3 times daily       omeprazole (PRILOSEC) 40 MG capsule Take 40 mg by mouth daily 1 hour before dinner       imiquimod (ALDARA) 5 % cream Apply topically as needed       DOXERCALCIFEROL IV Inject 6 mcg into the vein three times a week (with dialysis)       CLONAZEPAM PO Take 0.5 mg by mouth nightly as needed for anxiety (restless legs)       CLOPIDOGREL BISULFATE PO Take 75 mg by mouth every evening        abacavir (ZIAGEN) 300 MG tablet Take 600 mg by mouth every evening        chlorhexidine (CHLORHEXIDINE) 0.12 % solution Rinse and gargle 15 ml by mouth twice a day as directed.       terbinafine (LAMISIL AT) 1 % cream Apply topically 2 times daily as needed        atorvastatin (LIPITOR) 40 MG tablet Take 40 mg by mouth At Bedtime       epoetin brittni (EPOGEN,PROCRIT) 42478 UNIT/ML injection Inject 11,000 Units Subcutaneous three times a week WITH DIALYSIS       iron sucrose (VENOFER) 20 MG/ML injection Inject 50 mg into the vein once a week WIth dialysis       MAGNESIUM OXIDE PO Take 400 mg by mouth At Bedtime       dolutegravir (TIVICAY) 50 MG tablet Take 50 mg by mouth At Bedtime       nitroglycerin (NITROSTAT) 0.4 MG SL tablet One tablet under the tongue every 5 minutes if needed for chest pain. May repeat every 5 minutes for a maximum of 3 doses in 15 minutes\" 25 tablet 3     dapsone 100 MG tablet Take 100 mg by mouth At Bedtime        Darunavir Ethanolate (PREZISTA PO) Take 800 mg by mouth At Bedtime.       ritonavir (NORVIR) 100 MG capsule Take 1 capsule by mouth At Bedtime        amLODIPine (NORVASC) 5 MG tablet Take 1.5 tablets (7.5 mg) by mouth daily (Patient not " taking: Reported on 4/11/2018) 45 tablet 0     irbesartan (AVAPRO) 300 MG tablet Take 1 tablet (300 mg) by mouth At Bedtime 30 tablet 0     B COMPLEX-C-FOLIC ACID PO Take 1 tablet by mouth At Bedtime        order for DME Equipment being ordered: Other: 4WW  Treatment Diagnosis: Decreased activity tolerance 1 each 0     [DISCONTINUED] gabapentin (NEURONTIN) 300 MG capsule Take 600 mg by mouth every evening       [DISCONTINUED] gabapentin (NEURONTIN) 300 MG capsule Take 300 mg by mouth every morning        No facility-administered encounter medications on file as of 4/11/2018.        Again, thank you for allowing me to participate in the care of your patient.      Sincerely,    DEDRICK Fuller [facility]

## 2018-04-12 DIAGNOSIS — I25.10 CAD IN NATIVE ARTERY: Primary | ICD-10-CM

## 2018-04-12 NOTE — PROGRESS NOTES
Per Yuki Hinojosa NP patient to f/u with Dr. Diaz in 6 months.     Ordered - 6 month f/u with Dr. Emily Cordero RN  [facility]

## 2018-05-01 ENCOUNTER — TELEPHONE (OUTPATIENT)
Dept: CARDIOLOGY | Facility: CLINIC | Age: 68
End: 2018-05-01

## 2018-05-01 NOTE — TELEPHONE ENCOUNTER
"Received a fax from Clemencia Gaona [facility] from [facility] ([phone])    Clemencia wanted to see if patient should continue dual antiplatelet therapy. Patient has been on Plavix 75 mg/d day and ASA 81 mg/d since 2015.    Dr. Diaz OV notes from 01-29-18  \"At this time, I recommend continuing current cardiac medications of dual antiplatelet therapy, high-intensity statin and it interstitial blocker, long-acting nitrate, calcium channel blocker.  He had issues with anemia but this does not look like a GI blood loss tissue, likely due to end-stage renal disease.  However, in future if there is any concern about GI bleeding, Plavix can be discontinued and he can only use aspirin for antiplatelet therapy.  At this time, I am setting up a followup in 6 months, sooner if patient notices any change in clinical status, especially if any exertional related chest discomfort.\"    Yuki Hinojosa NP OV notes from  04-11-18  \"Coronary artery disease.  History of multiple myocardial infarctions and stenting to the mid-LAD and multiple stenting to diagonal vessel due to in-stent restenosis.  Recent hospitalizations for chest pain were related to significant anemia and heart failure exacerbations.  Since then, he has received multiple blood transfusions and increased frequency of dialysis with a lower dry weight.  He has now been stable for the last month.  We will continue current medical regimen.  He has not had significant chest pain.\"    Patient should continue dual antiplatelet therapy.    Clemencia also wanted to inform us that patient was taken off his Amlodpine by [facility]. Our med list has Amlodipine listed, but states that patient is not taking it. Amlodipine removed from med list.    LM for Clemencia to return my call, to review \"the above.\"    Sent message/FYI to Yuki Hinojosa NP. ~Gil SILVERMAN  --------------------------------------------------------------------------------------------------------------------  Addendum 05-10-18  (2:49 " "pm)    Spoke to Clemencia and \"reviewed above.\" Patient should continue dual antiplatelet therapy and f/u in 5 months as planned. Clemencia had no questions.  ~Gil SILVERMAN               "

## 2018-07-12 ENCOUNTER — APPOINTMENT (OUTPATIENT)
Dept: CT IMAGING | Facility: CLINIC | Age: 68
End: 2018-07-12
Payer: MEDICARE

## 2018-07-12 ENCOUNTER — HOSPITAL ENCOUNTER (OUTPATIENT)
Facility: CLINIC | Age: 68
Setting detail: OBSERVATION
Discharge: HOME | End: 2018-07-14
Attending: EMERGENCY MEDICINE | Admitting: INTERNAL MEDICINE
Payer: MEDICARE

## 2018-07-12 ENCOUNTER — APPOINTMENT (OUTPATIENT)
Dept: GENERAL RADIOLOGY | Facility: CLINIC | Age: 68
End: 2018-07-12
Payer: MEDICARE

## 2018-07-12 DIAGNOSIS — Z99.2 ESRD (END STAGE RENAL DISEASE) ON DIALYSIS (H): ICD-10-CM

## 2018-07-12 DIAGNOSIS — N18.6 ESRD (END STAGE RENAL DISEASE) ON DIALYSIS (H): ICD-10-CM

## 2018-07-12 DIAGNOSIS — R53.1 GENERALIZED WEAKNESS: ICD-10-CM

## 2018-07-12 LAB
ALBUMIN SERPL-MCNC: 4 G/DL (ref 3.4–5)
ALBUMIN UR-MCNC: >499 MG/DL
ALP SERPL-CCNC: 127 U/L (ref 40–150)
ALT SERPL W P-5'-P-CCNC: 22 U/L (ref 0–70)
ANION GAP SERPL CALCULATED.3IONS-SCNC: 8 MMOL/L (ref 3–14)
APPEARANCE UR: ABNORMAL
AST SERPL W P-5'-P-CCNC: 12 U/L (ref 0–45)
BASOPHILS # BLD AUTO: 0 10E9/L (ref 0–0.2)
BASOPHILS NFR BLD AUTO: 0.2 %
BILIRUB SERPL-MCNC: 0.6 MG/DL (ref 0.2–1.3)
BILIRUB UR QL STRIP: NEGATIVE
BUN SERPL-MCNC: 25 MG/DL (ref 7–30)
CALCIUM SERPL-MCNC: 9.2 MG/DL (ref 8.5–10.1)
CHLORIDE SERPL-SCNC: 98 MMOL/L (ref 94–109)
CO2 SERPL-SCNC: 30 MMOL/L (ref 20–32)
COLOR UR AUTO: YELLOW
CREAT SERPL-MCNC: 5.3 MG/DL (ref 0.66–1.25)
DIFFERENTIAL METHOD BLD: ABNORMAL
EOSINOPHIL # BLD AUTO: 0.1 10E9/L (ref 0–0.7)
EOSINOPHIL NFR BLD AUTO: 1.9 %
ERYTHROCYTE [DISTWIDTH] IN BLOOD BY AUTOMATED COUNT: 14.8 % (ref 10–15)
GFR SERPL CREATININE-BSD FRML MDRD: 11 ML/MIN/1.7M2
GLUCOSE BLDC GLUCOMTR-MCNC: 131 MG/DL (ref 70–99)
GLUCOSE SERPL-MCNC: 223 MG/DL (ref 70–99)
GLUCOSE UR STRIP-MCNC: >499 MG/DL
HCT VFR BLD AUTO: 29.7 % (ref 40–53)
HGB BLD-MCNC: 9.2 G/DL (ref 13.3–17.7)
HGB UR QL STRIP: NEGATIVE
HYALINE CASTS #/AREA URNS LPF: 1 /LPF (ref 0–2)
IMM GRANULOCYTES # BLD: 0 10E9/L (ref 0–0.4)
IMM GRANULOCYTES NFR BLD: 0.4 %
KETONES UR STRIP-MCNC: NEGATIVE MG/DL
LACTATE BLD-SCNC: 2 MMOL/L (ref 0.7–2)
LEUKOCYTE ESTERASE UR QL STRIP: NEGATIVE
LYMPHOCYTES # BLD AUTO: 0.9 10E9/L (ref 0.8–5.3)
LYMPHOCYTES NFR BLD AUTO: 18.8 %
MCH RBC QN AUTO: 30.4 PG (ref 26.5–33)
MCHC RBC AUTO-ENTMCNC: 31 G/DL (ref 31.5–36.5)
MCV RBC AUTO: 98 FL (ref 78–100)
MONOCYTES # BLD AUTO: 0.5 10E9/L (ref 0–1.3)
MONOCYTES NFR BLD AUTO: 10.1 %
MUCOUS THREADS #/AREA URNS LPF: PRESENT /LPF
NEUTROPHILS # BLD AUTO: 3.2 10E9/L (ref 1.6–8.3)
NEUTROPHILS NFR BLD AUTO: 68.6 %
NITRATE UR QL: NEGATIVE
NRBC # BLD AUTO: 0 10*3/UL
NRBC BLD AUTO-RTO: 0 /100
PH UR STRIP: 8 PH (ref 5–7)
PLATELET # BLD AUTO: 126 10E9/L (ref 150–450)
POTASSIUM SERPL-SCNC: 4.1 MMOL/L (ref 3.4–5.3)
PROT SERPL-MCNC: 8.1 G/DL (ref 6.8–8.8)
RBC # BLD AUTO: 3.03 10E12/L (ref 4.4–5.9)
RBC #/AREA URNS AUTO: 3 /HPF (ref 0–2)
SODIUM SERPL-SCNC: 136 MMOL/L (ref 133–144)
SOURCE: ABNORMAL
SP GR UR STRIP: 1.01 (ref 1–1.03)
SQUAMOUS #/AREA URNS AUTO: 1 /HPF (ref 0–1)
TROPONIN I SERPL-MCNC: <0.015 UG/L (ref 0–0.04)
UROBILINOGEN UR STRIP-MCNC: 0 MG/DL (ref 0–2)
WBC # BLD AUTO: 4.7 10E9/L (ref 4–11)
WBC #/AREA URNS AUTO: 4 /HPF (ref 0–5)

## 2018-07-12 PROCEDURE — 99220 ZZC INITIAL OBSERVATION CARE,LEVL III: CPT | Performed by: INTERNAL MEDICINE

## 2018-07-12 PROCEDURE — 96372 THER/PROPH/DIAG INJ SC/IM: CPT

## 2018-07-12 PROCEDURE — 84484 ASSAY OF TROPONIN QUANT: CPT | Performed by: PHYSICIAN ASSISTANT

## 2018-07-12 PROCEDURE — 00000146 ZZHCL STATISTIC GLUCOSE BY METER IP

## 2018-07-12 PROCEDURE — 71046 X-RAY EXAM CHEST 2 VIEWS: CPT

## 2018-07-12 PROCEDURE — 99285 EMERGENCY DEPT VISIT HI MDM: CPT | Mod: 25

## 2018-07-12 PROCEDURE — 93005 ELECTROCARDIOGRAM TRACING: CPT

## 2018-07-12 PROCEDURE — A9270 NON-COVERED ITEM OR SERVICE: HCPCS | Mod: GY | Performed by: INTERNAL MEDICINE

## 2018-07-12 PROCEDURE — 25000132 ZZH RX MED GY IP 250 OP 250 PS 637: Mod: GY | Performed by: INTERNAL MEDICINE

## 2018-07-12 PROCEDURE — 25000131 ZZH RX MED GY IP 250 OP 636 PS 637: Mod: GY | Performed by: INTERNAL MEDICINE

## 2018-07-12 PROCEDURE — 80053 COMPREHEN METABOLIC PANEL: CPT | Performed by: PHYSICIAN ASSISTANT

## 2018-07-12 PROCEDURE — 83605 ASSAY OF LACTIC ACID: CPT | Performed by: PHYSICIAN ASSISTANT

## 2018-07-12 PROCEDURE — 81001 URINALYSIS AUTO W/SCOPE: CPT | Performed by: PHYSICIAN ASSISTANT

## 2018-07-12 PROCEDURE — G0378 HOSPITAL OBSERVATION PER HR: HCPCS

## 2018-07-12 PROCEDURE — 70450 CT HEAD/BRAIN W/O DYE: CPT

## 2018-07-12 PROCEDURE — 25000128 H RX IP 250 OP 636: Performed by: PHYSICIAN ASSISTANT

## 2018-07-12 PROCEDURE — 85025 COMPLETE CBC W/AUTO DIFF WBC: CPT | Performed by: PHYSICIAN ASSISTANT

## 2018-07-12 PROCEDURE — 93005 ELECTROCARDIOGRAM TRACING: CPT | Mod: 76

## 2018-07-12 RX ORDER — NICOTINE POLACRILEX 4 MG
15-30 LOZENGE BUCCAL
Status: DISCONTINUED | OUTPATIENT
Start: 2018-07-12 | End: 2018-07-14 | Stop reason: HOSPADM

## 2018-07-12 RX ORDER — ACETAMINOPHEN 325 MG/1
650 TABLET ORAL EVERY 4 HOURS PRN
Status: DISCONTINUED | OUTPATIENT
Start: 2018-07-12 | End: 2018-07-14 | Stop reason: HOSPADM

## 2018-07-12 RX ORDER — HYDRALAZINE HYDROCHLORIDE 25 MG/1
25 TABLET, FILM COATED ORAL 3 TIMES DAILY
Status: DISCONTINUED | OUTPATIENT
Start: 2018-07-12 | End: 2018-07-14 | Stop reason: HOSPADM

## 2018-07-12 RX ORDER — NALOXONE HYDROCHLORIDE 0.4 MG/ML
.1-.4 INJECTION, SOLUTION INTRAMUSCULAR; INTRAVENOUS; SUBCUTANEOUS
Status: DISCONTINUED | OUTPATIENT
Start: 2018-07-12 | End: 2018-07-14 | Stop reason: HOSPADM

## 2018-07-12 RX ORDER — ISOSORBIDE MONONITRATE 60 MG/1
120 TABLET, EXTENDED RELEASE ORAL EVERY EVENING
Status: DISCONTINUED | OUTPATIENT
Start: 2018-07-12 | End: 2018-07-14 | Stop reason: HOSPADM

## 2018-07-12 RX ORDER — ALBUTEROL SULFATE 90 UG/1
2 AEROSOL, METERED RESPIRATORY (INHALATION) EVERY 6 HOURS PRN
Status: DISCONTINUED | OUTPATIENT
Start: 2018-07-12 | End: 2018-07-14 | Stop reason: HOSPADM

## 2018-07-12 RX ORDER — PREGABALIN 25 MG/1
25 CAPSULE ORAL
Status: ON HOLD | COMMUNITY
End: 2018-09-01

## 2018-07-12 RX ORDER — DAPSONE 100 MG/1
100 TABLET ORAL AT BEDTIME
Status: DISCONTINUED | OUTPATIENT
Start: 2018-07-12 | End: 2018-07-14 | Stop reason: HOSPADM

## 2018-07-12 RX ORDER — DEXTROSE MONOHYDRATE 25 G/50ML
25-50 INJECTION, SOLUTION INTRAVENOUS
Status: DISCONTINUED | OUTPATIENT
Start: 2018-07-12 | End: 2018-07-14 | Stop reason: HOSPADM

## 2018-07-12 RX ORDER — ACETAMINOPHEN 650 MG/1
650 SUPPOSITORY RECTAL EVERY 4 HOURS PRN
Status: DISCONTINUED | OUTPATIENT
Start: 2018-07-12 | End: 2018-07-14 | Stop reason: HOSPADM

## 2018-07-12 RX ORDER — CLONAZEPAM 0.5 MG/1
0.5 TABLET ORAL
Status: DISCONTINUED | OUTPATIENT
Start: 2018-07-12 | End: 2018-07-14 | Stop reason: HOSPADM

## 2018-07-12 RX ORDER — ASPIRIN 81 MG/1
81 TABLET ORAL DAILY
Status: DISCONTINUED | OUTPATIENT
Start: 2018-07-12 | End: 2018-07-14 | Stop reason: HOSPADM

## 2018-07-12 RX ORDER — SODIUM CHLORIDE 9 MG/ML
1000 INJECTION, SOLUTION INTRAVENOUS CONTINUOUS
Status: DISCONTINUED | OUTPATIENT
Start: 2018-07-12 | End: 2018-07-14 | Stop reason: HOSPADM

## 2018-07-12 RX ORDER — IPRATROPIUM BROMIDE AND ALBUTEROL SULFATE 2.5; .5 MG/3ML; MG/3ML
1 SOLUTION RESPIRATORY (INHALATION) EVERY 6 HOURS PRN
Status: DISCONTINUED | OUTPATIENT
Start: 2018-07-12 | End: 2018-07-14 | Stop reason: HOSPADM

## 2018-07-12 RX ORDER — MYCOPHENOLATE MOFETIL 500 MG/1
500 TABLET ORAL 2 TIMES DAILY
Status: DISCONTINUED | OUTPATIENT
Start: 2018-07-12 | End: 2018-07-12 | Stop reason: CLARIF

## 2018-07-12 RX ORDER — ABACAVIR 300 MG/1
600 TABLET ORAL EVERY EVENING
Status: DISCONTINUED | OUTPATIENT
Start: 2018-07-12 | End: 2018-07-14 | Stop reason: HOSPADM

## 2018-07-12 RX ORDER — IRBESARTAN 300 MG/1
300 TABLET ORAL AT BEDTIME
Status: DISCONTINUED | OUTPATIENT
Start: 2018-07-12 | End: 2018-07-14 | Stop reason: HOSPADM

## 2018-07-12 RX ORDER — CARVEDILOL 12.5 MG/1
12.5 TABLET ORAL 2 TIMES DAILY WITH MEALS
Status: DISCONTINUED | OUTPATIENT
Start: 2018-07-12 | End: 2018-07-14 | Stop reason: HOSPADM

## 2018-07-12 RX ORDER — ONDANSETRON 2 MG/ML
4 INJECTION INTRAMUSCULAR; INTRAVENOUS EVERY 6 HOURS PRN
Status: DISCONTINUED | OUTPATIENT
Start: 2018-07-12 | End: 2018-07-14 | Stop reason: HOSPADM

## 2018-07-12 RX ORDER — ONDANSETRON 4 MG/1
4 TABLET, ORALLY DISINTEGRATING ORAL EVERY 6 HOURS PRN
Status: DISCONTINUED | OUTPATIENT
Start: 2018-07-12 | End: 2018-07-14 | Stop reason: HOSPADM

## 2018-07-12 RX ORDER — PREGABALIN 100 MG/1
100 CAPSULE ORAL AT BEDTIME
Status: ON HOLD | COMMUNITY
End: 2018-09-01

## 2018-07-12 RX ORDER — MYCOPHENOLATE MOFETIL 250 MG/1
500 CAPSULE ORAL 2 TIMES DAILY
Status: DISCONTINUED | OUTPATIENT
Start: 2018-07-12 | End: 2018-07-14 | Stop reason: HOSPADM

## 2018-07-12 RX ORDER — CLOPIDOGREL BISULFATE 75 MG/1
75 TABLET ORAL EVERY EVENING
Status: DISCONTINUED | OUTPATIENT
Start: 2018-07-12 | End: 2018-07-14 | Stop reason: HOSPADM

## 2018-07-12 RX ADMIN — SODIUM CHLORIDE 500 ML: 9 INJECTION, SOLUTION INTRAVENOUS at 17:24

## 2018-07-12 RX ADMIN — CLOPIDOGREL 75 MG: 75 TABLET, FILM COATED ORAL at 21:57

## 2018-07-12 RX ADMIN — DAPSONE 100 MG: 100 TABLET ORAL at 22:00

## 2018-07-12 RX ADMIN — OMEPRAZOLE 40 MG: 20 CAPSULE, DELAYED RELEASE ORAL at 21:58

## 2018-07-12 RX ADMIN — ACETAMINOPHEN 650 MG: 325 TABLET, FILM COATED ORAL at 22:18

## 2018-07-12 RX ADMIN — ASPIRIN 81 MG: 81 TABLET, COATED ORAL at 21:57

## 2018-07-12 RX ADMIN — DOLUTEGRAVIR SODIUM 50 MG: 50 TABLET, FILM COATED ORAL at 21:56

## 2018-07-12 RX ADMIN — HYDRALAZINE HYDROCHLORIDE 25 MG: 25 TABLET ORAL at 21:52

## 2018-07-12 RX ADMIN — ISOSORBIDE MONONITRATE 120 MG: 60 TABLET, EXTENDED RELEASE ORAL at 21:58

## 2018-07-12 RX ADMIN — IRBESARTAN 300 MG: 300 TABLET ORAL at 22:00

## 2018-07-12 RX ADMIN — CARVEDILOL 12.5 MG: 12.5 TABLET, FILM COATED ORAL at 21:59

## 2018-07-12 RX ADMIN — ABACAVIR 600 MG: 300 TABLET, FILM COATED ORAL at 21:51

## 2018-07-12 RX ADMIN — MYCOPHENOLATE MOFETIL 500 MG: 250 CAPSULE ORAL at 21:50

## 2018-07-12 RX ADMIN — INSULIN GLARGINE 50 UNITS: 100 INJECTION, SOLUTION SUBCUTANEOUS at 22:01

## 2018-07-12 ASSESSMENT — ENCOUNTER SYMPTOMS
DIARRHEA: 0
ABDOMINAL PAIN: 0
VOMITING: 0
HEADACHES: 1
SHORTNESS OF BREATH: 0
WEAKNESS: 1
FEVER: 0
COUGH: 1
NAUSEA: 0
FATIGUE: 1

## 2018-07-12 NOTE — ED PROVIDER NOTES
History     Chief Complaint:  Generalized Weakness    HPI   Donald Raza is a 68 year old male with a history of CAD, hypertension, hyperlipidemia, HIV/AIDS, type II diabetes mellitus, and ESRD on dialysis last performed earlier this morning who presents for evaluation of generalized weakness. On the evening of 7/11/2018 around 2200, the patient started to develop generalized weakness and feelings of fatigue. This morning, the patient slept through his alarm, which is abnormal for him, and since waking up he has felt abnormally fatigued and generally weak. He did complete dialysis this morning and reports that his fatigue has persistent since then. He initially sought evaluation in an urgent care but was referred to the ED for further evaluation and management. He also complains of a headache and a slight dry cough, however he reports that he has these symptoms intermittently at baseline and they have not been significantly worse recently. Otherwise, he denies any recent fever, chest pain, shortness of breath, abdominal pain, nausea, vomiting, or diarrhea.     Allergies:  Calcium acetate  Diagnostic X-ray materials  Lisinopril  Sulfa drugs      Medications:    abacavir (ZIAGEN) 300 MG tablet  Acetaminophen (TYLENOL PO)  albuterol (PROAIR HFA/PROVENTIL HFA/VENTOLIN HFA) 108 (90 BASE) MCG/ACT Inhaler  aspirin EC 81 MG EC tablet  atorvastatin (LIPITOR) 40 MG tablet  B COMPLEX-C-FOLIC ACID PO  benzonatate (TESSALON) 100 MG capsule  calcium carbonate (TUMS) 500 MG chewable tablet  carvedilol (COREG) 12.5 MG tablet  chlorhexidine (CHLORHEXIDINE) 0.12 % solution  CLONAZEPAM PO  CLOPIDOGREL BISULFATE PO  dapsone 100 MG tablet  Darunavir Ethanolate (PREZISTA PO)  dolutegravir (TIVICAY) 50 MG tablet  DOXERCALCIFEROL IV  epoetin brittni (EPOGEN,PROCRIT) 82860 UNIT/ML injection  guaiFENesin (MUCINEX) 600 MG 12 hr tablet  hydrALAZINE (APRESOLINE) 25 MG tablet  imiquimod (ALDARA) 5 % cream  insulin glargine (LANTUS) 100 UNIT/ML  injection  Insulin Lispro (HUMALOG PEN SC)  ipratropium - albuterol 0.5 mg/2.5 mg/3 mL (DUONEB) 0.5-2.5 (3) MG/3ML neb solution  irbesartan (AVAPRO) 300 MG tablet  iron sucrose (VENOFER) 20 MG/ML injection  Isosorbide Mononitrate  MG TB24  MAGNESIUM OXIDE PO  mycophenolate (GENERIC EQUIVALENT) 500 MG tablet  nitroglycerin (NITROSTAT) 0.4 MG SL tablet  Nutritional Supplements (NEPRO PO)  omega-3 acid ethyl esters (LOVAZA) 1 G capsule  omeprazole (PRILOSEC) 40 MG capsule  Pregabalin (LYRICA PO)  pregabalin (LYRICA) 75 MG capsule  ritonavir (NORVIR) 100 MG capsule  terbinafine (LAMISIL AT) 1 % cream     Past Medical History:    ACS   CAD s/p percutaneous coronary angioplasty  Huang disease  Bradycardia  AIDS  Chronic kidney disease  Dilated aortic root  ESRD on dialysis   Hemodialysis-associated hypotension  HIV/AIDS  Hypertension  Impotence of organic origin  Increased prostate specific antigen velocity   Hyperlipidemia  NSTEMI   TIA  Diabetes mellitus, type II   Unstable angina     Past Surgical History:    Angiogram   Appendectomy   Cholecystectomy, laparoscopic  Colostomy   Left heart catheterization  Heart cath angioplasty  STENT, coronary     Family History:    Heart disease - Brother   Hypertension - Sister    Kidney disease - Sister    Social History:  Tobacco use:    Former smoker - 2.00 packs / day for 41 years, quit 2003   Alcohol use:    Negative  Marital status:       Accompanied to ED by:  Family member      Review of Systems   Constitutional: Positive for fatigue. Negative for fever.   Respiratory: Positive for cough. Negative for shortness of breath.    Cardiovascular: Negative for chest pain.   Gastrointestinal: Negative for abdominal pain, diarrhea, nausea and vomiting.   Neurological: Positive for weakness and headaches.   All other systems reviewed and are negative.    Physical Exam   First Vitals:  BP: 139/80  Pulse: 75  Heart Rate: 75  Temp: 98.7  F (37.1  C)  Weight: 84 kg (185 lb 3  oz)  SpO2: 98 %    Physical Exam  General: Well appearing, well nourished. Normal mood and affect.  Skin: Good turgor, no rash, no unusual bruising or prominent lesions.  HEENT: Head: Normocephalic, atraumatic, no visible masses.   Eyes: Conjunctiva clear, sclera non-icteric, EOM intact, PERRL.   Cardiac: Normal rate and regular rhythm, no murmur or gallop.   Lungs: Clear to auscultation and percussion   Abdomen: Mild lower abdominal tenderness to palpation. Bowel sounds normal, no organomegaly, masses, or hernia. No guarding or rebound tenderness.   Musculoskeletal: 5/5 strength in upper and lower extremities. 4/5 strength in lower extremities. Normal gait and station. No calf tenderness or swelling.   Neurologic: Oriented x 3. CN 2-12 normal. Sensation to pain and touch intact throughout upper and lower extremities.   Psychiatric: Intact recent and remote memory, judgment and insight, normal mood and affect.     Emergency Department Course   ECG (15:20:09):  Indication: Screening for cardiovascular disease.   Rate 74 bpm. VA interval 158 ms. QRS duration 104 ms. QT/QTc 398/441 ms. P-R-T axes 21 -10 62.   Interpretation: Normal sinus rhythm, Minimal voltage criteria for LVH may be normal variant, Borderline ECG   Agree with computer interpretation. Yes   Interpreted at 1523 by Dr. Connelly.       ECG (17:30:43):  Indication: Screening for cardiovascular disease.   Rate 71 bpm. VA interval 166 ms. QRS duration 102 ms. QT/QTc 410/445 ms. P-R-T axes 24 -12 68.   Interpretation: Normal sinus rhythm, Incomplete right bundle branch block, Minimal voltage criteria for LVH may be normal variant, Borderline ECG   Agree with computer interpretation. Yes .   Interpreted at 1733 by Dr. Connelly.      Imaging:  Radiographic findings were communicated with the patient and family who voiced understanding of the findings.    XR Chest:  IMPRESSION: There has been improvement of what is now only mild diffuse interstitial prominence,  likely representing vascular congestion or pulmonary edema. No other abnormality or change is seen.  The heart size is normal and no pleural effusion is seen.   Per radiology.     CT Head w/o Contrast:  IMPRESSION: Diffuse cerebral volume loss and cerebral white matter changes consistent with chronic small vessel ischemic disease. No evidence for acute intracranial pathology.  Preliminary report per radiology.     Laboratory:  CBC: HGB 9.2 low,  low, o/w WNL (WBC 4.7)  CMP: Glucose 223 high, Creatinine 5.30 high, GFR Estimate 11 low, o/w WNL   Lactic acid: 2.0   Troponin I 1530: <0.015   UA with Microscopic: GLC >499, pH 8.0 high, Protein albumin >499, RBC 3 high, Mucous present, o/w Negative      Interventions:  1724  mL IV     Emergency Department Course:  Nursing notes and vitals reviewed.  1527: I performed an exam of the patient as documented above.     1730 Dr. Orozco of hospitalist staff was consulted.     Findings and plan explained to the Patient who consents to admission. Discussed the patient with Dr. Orozco, who will admit the patient to a med/surg bed for further monitoring, evaluation, and treatment.     Impression & Plan    Medical Decision Making:  Donald Raza is a 68 male who presented to the ED today for evaluation of generalized weakness.  The patient has a complex medical history that can be noted in the HPI.  Differential diagnosis here today included ACS, infection, electrolyte abnormalities, anemia, hypovolemia, stroke, amongst others.  Workup here today included no acute sources of infection.  UA was clear, chest x-ray showed no acute abnormalities.  Patient had a mildly low hemoglobin at 9.2.  His creatinine was also elevated, this is approximately where his renal function typically runs.  Lactic acid was within normal limits.  EKG showed no acute abnormalities.  Head CT showed no signs of intracranial bleed.  Upon reevaluation, the patient appears comfortable but still expresses  symptoms of weakness.  Due to the patient's complex past medical history and his persistent generalized weakness, I did not feel comfortable with the patient's discharge.  His case was discussed with Dr. Orozco who agreed to admit the patient to the hospital for further observation and care.  All of the patient's workup findings were discussed with him and his significant other.  He was in agreement for admission and the plan stated above.    Diagnosis:    ICD-10-CM   1. Generalized weakness R53.1   2. ESRD (end stage renal disease) on dialysis (H) N18.6    Z99.2     Disposition:  Admitted to Dr. Orozco.     BRUCE, Nitesh He, am serving as a scribe at 3:27 PM on 7/12/2018 to document services personally performed by Danika REAL, based on my observations and the provider's statements to me.    [facility] EMERGENCY DEPARTMENT       Danika Mcbride PA  07/12/18 8131

## 2018-07-12 NOTE — IP AVS SNAPSHOT
[facility] [address] [facility]    [address]    [facility] [address]    Phone:  [phone]    Fax:  [phone]                                       After Visit Summary   7/12/2018    Donald Raza    MRN: [id number]           After Visit Summary Signature Page     I have received my discharge instructions, and my questions have been answered. I have discussed any challenges I see with this plan with the nurse or doctor.    ..........................................................................................................................................  Patient/Patient Representative Signature      ..........................................................................................................................................  Patient Representative Print Name and Relationship to Patient    ..................................................               ................................................  Date                                            Time    ..........................................................................................................................................  Reviewed by Signature/Title    ...................................................              ..............................................  Date                                                            Time

## 2018-07-12 NOTE — ED NOTES
[facility]  ED Nurse Handoff Report    Donald Raza is a 68 year old male   ED Chief complaint: Generalized Weakness  . ED Diagnosis:   Final diagnoses:   Generalized weakness   ESRD (end stage renal disease) on dialysis (H)     Allergies:   Allergies   Allergen Reactions     Calcium Acetate Other (See Comments)     Other reaction(s): Other, see comments  Pain in chest and back  Pain in chest area (sensitive skin)      Diagnostic X-Ray Materials Other (See Comments)     PN: renal failure     Lisinopril      Sulfa Drugs        Code Status: Not on file  Activity level - Baseline/Home:  Independent. Activity Level - Current:   Stand with Assist. Lift room needed: No. Bariatric: No   Needed: No   Isolation: No. Infection: Not Applicable  AIDS.     Vital Signs:   Vitals:    07/12/18 1522 07/12/18 1526 07/12/18 1602 07/12/18 1603   BP:  139/80 154/74    Pulse:       Temp: 98.7  F (37.1  C)      TempSrc: Oral      SpO2:    97%   Weight:           Cardiac Rhythm:  ,      Pain level: 0-10 Pain Scale: 7  Patient confused: No. Patient Falls Risk: Yes.   Elimination Status: Has voided   Patient Report - Initial Complaint: Weakness. Focused Assessment:  Pt noticed increased general weakness that started yesterday.  States he also has a cough, but denies any shortness breath.  Lung sounds clears.  Denies any other symptoms.  On dialysis T-TH-S, had it done today.  Left limb alert.  Pt has HIV/AIDS.      Tests Performed:   Labs Ordered and Resulted from Time of ED Arrival Up to the Time of Departure from the ED   CBC WITH PLATELETS DIFFERENTIAL - Abnormal; Notable for the following:        Result Value    RBC Count 3.03 (*)     Hemoglobin 9.2 (*)     Hematocrit 29.7 (*)     MCHC 31.0 (*)     Platelet Count 126 (*)     All other components within normal limits   ROUTINE UA WITH MICROSCOPIC - Abnormal; Notable for the following:     Glucose Urine >499 (*)     pH Urine 8.0 (*)     Protein Albumin Urine >499  (*)     RBC Urine 3 (*)     Mucous Urine Present (*)     All other components within normal limits   COMPREHENSIVE METABOLIC PANEL - Abnormal; Notable for the following:     Glucose 223 (*)     Creatinine 5.30 (*)     GFR Estimate 11 (*)     GFR Estimate If Black 13 (*)     All other components within normal limits   TROPONIN I   LACTIC ACID WHOLE BLOOD   NURSING DRAW AND HOLD     CT Head w/o Contrast   Preliminary Result   IMPRESSION: Diffuse cerebral volume loss and cerebral white matter   changes consistent with chronic small vessel ischemic disease. No   evidence for acute intracranial pathology.            Radiation dose for this scan was reduced using automated exposure   control, adjustment of the mA and/or kV according to patient size, or   iterative reconstruction technique.         XR Chest 2 Views   Final Result   IMPRESSION: There has been improvement of what is now only mild   diffuse interstitial prominence, likely representing vascular   congestion or pulmonary edema. No other abnormality or change is seen.   The heart size is normal and no pleural effusion is seen.       CHIKA SUTHERLAND MD          Treatments provided: See MAR  Family Comments: None present  OBS brochure/video discussed/provided to patient:  N/A  ED Medications:   Medications   0.9% sodium chloride BOLUS (500 mLs Intravenous New Bag 7/12/18 7163)     Followed by   sodium chloride 0.9% infusion (not administered)     Drips infusing:  No  For the majority of the shift, the patient's behavior Green. Interventions performed were Rounding.     Severe Sepsis OR Septic Shock Diagnosis Present: No      ED Nurse Name/Phone Number: Rosalba Lala,   5:28 PM    RECEIVING UNIT ED HANDOFF REVIEW    Above ED Nurse Handoff Report was reviewed: Yes  Reviewed by: Chino Reyez on July 12, 2018 at 6:32 PM

## 2018-07-12 NOTE — IP AVS SNAPSHOT
MRN:[id number]                      After Visit Summary   7/12/2018    Donald Raza    MRN: [id number]           Thank you!     Thank you for choosing [facility] for your care. Our goal is always to provide you with excellent care. Hearing back from our patients is one way we can continue to improve our services. Please take a few minutes to complete the written survey that you may receive in the mail after you visit. If you would like to speak to someone directly about your visit please contact Patient Relations at [phone]. Thank you!          Patient Information     Date Of Birth          1950        About your hospital stay     You were admitted on:  July 12, 2018 You last received care in the:  [place] [address] [facility]    You were discharged on:  July 14, 2018        Reason for your hospital stay       Acute on chronic anemia.                  Who to Call     For medical emergencies, please call 911.  For non-urgent questions about your medical care, please call your primary care provider or clinic, [phone]          Attending Provider     Provider Specialty    Lionel Connelly MD Emergency Medicine    [facility]       Primary Care Provider Office Phone # Fax #    Aida Thomas [phone]      After Care Instructions     Activity       Your activity upon discharge: activity as tolerated            Diet       Follow this diet upon discharge: dialysis diet                  Follow-up Appointments     Follow-up and recommended labs and tests        Follow up with primary care provider, Aida Thomas, within 7 days for hospital follow- up.  No follow up labs or test are needed.                  Pending Results     No orders found from 7/10/2018 to 7/13/2018.            Statement of Approval     Ordered          07/14/18 1304  I have reviewed and agree with all the recommendations and orders detailed in this  "document.  EFFECTIVE NOW     Approved and electronically signed by:  Eamon Brady MD             Admission Information     Date & Time Provider Department Dept. Phone    2018 HerJose De Jesus MD [place] [address] [facility] [phone]      Your Vitals Were     Blood Pressure Pulse Temperature Respirations Height Weight    149/74 (BP Location: Right arm) 69 98  F (36.7  C) (Oral) 20 1.803 m (5' 11\") 86.6 kg (190 lb 14.7 oz)    Pulse Oximetry BMI (Body Mass Index)                96% 26.63 kg/m2          MyChart Information     [employer] lets you send messages to your doctor, view your test results, renew your prescriptions, schedule appointments and more. To sign up, go to [email or web address] . Click on \"Log in\" on the left side of the screen, which will take you to the Welcome page. Then click on \"Sign up Now\" on the right side of the page.     You will be asked to enter the access code listed below, as well as some personal information. Please follow the directions to create your username and password.     Your access code is: [email or web address]  Expires: 10/12/2018  2:49 PM     Your access code will  in 90 days. If you need help or a new code, please call your [facility] clinic or [phone].        Care EveryWhere ID     This is your Care EveryWhere ID. This could be used by other organizations to access your [facility] medical records  [id number]        Equal Access to Services     [facility]: Hadii liza fulton hadasho Sopamali, waaxda luqadaha, qaybta kaalmada miteshyada, fatoumata olivo. So [facility] [phone].    ATENCIÓN: Si habla español, tiene a ayala disposición servicios gratuitos de asistencia lingüística. Llame al [phone].    We comply with applicable federal civil rights laws and [place] laws. We do not discriminate on the basis of race, color, national origin, age, disability, sex, sexual orientation, or gender identity.               Review of your medicines    "   CONTINUE these medicines which have NOT CHANGED        Dose / Directions    abacavir 300 MG tablet   Commonly known as:  ZIAGEN        Dose:  600 mg   Take 600 mg by mouth every evening   Refills:  0       albuterol 108 (90 Base) MCG/ACT Inhaler   Commonly known as:  PROAIR HFA/PROVENTIL HFA/VENTOLIN HFA   Used for:  Cough        Dose:  2 puff   Inhale 2 puffs into the lungs every 6 hours as needed for shortness of breath / dyspnea or wheezing   Quantity:  1 Inhaler   Refills:  1       aspirin 81 MG EC tablet   Used for:  Coronary artery disease, angina presence unspecified, unspecified vessel or lesion type, unspecified whether native or transplanted heart        Dose:  81 mg   Take 1 tablet (81 mg) by mouth daily   Quantity:  30 tablet   Refills:  0       atorvastatin 40 MG tablet   Commonly known as:  LIPITOR        Dose:  40 mg   Take 40 mg by mouth At Bedtime   Refills:  0       B COMPLEX-C-FOLIC ACID PO        Dose:  1 tablet   Take 1 tablet by mouth At Bedtime   Refills:  0       carvedilol 12.5 MG tablet   Commonly known as:  COREG   Used for:  Hypertension secondary to other renal disorders        Dose:  12.5 mg   Take 1 tablet (12.5 mg) by mouth 2 times daily (with meals)   Quantity:  60 tablet   Refills:  0       chlorhexidine 0.12 % solution   Commonly known as:  PERIDEX        Rinse and gargle 15 ml by mouth twice a day as directed.   Refills:  0       CLONAZEPAM PO        Dose:  0.5 mg   Take 0.5 mg by mouth nightly as needed for anxiety (restless legs)   Refills:  0       CLOPIDOGREL BISULFATE PO        Dose:  75 mg   Take 75 mg by mouth every evening   Refills:  0       dapsone 100 MG tablet        Dose:  100 mg   Take 100 mg by mouth At Bedtime   Refills:  0       dolutegravir 50 MG tablet   Commonly known as:  TIVICAY        Dose:  50 mg   Take 50 mg by mouth At Bedtime   Refills:  0       DOXERCALCIFEROL IV        Dose:  6 mcg   Inject 6 mcg into the vein three times a week (with dialysis)    Refills:  0       epoetin brittni 90087 UNIT/ML injection   Commonly known as:  EPOGEN/PROCRIT        Dose:  06058 Units   Inject 11,000 Units Subcutaneous three times a week WITH DIALYSIS   Refills:  0       guaiFENesin 600 MG 12 hr tablet   Commonly known as:  MUCINEX        Take by mouth as needed for congestion   Refills:  0       HUMALOG PEN SC        Take 15 units TID and an additional 2 units if BG is over 150   Refills:  0       hydrALAZINE 25 MG tablet   Commonly known as:  APRESOLINE   Used for:  Hypertension secondary to other renal disorders        Dose:  25 mg   Take 1 tablet (25 mg) by mouth 3 times daily   Quantity:  90 tablet   Refills:  0       imiquimod 5 % cream   Commonly known as:  ALDARA        Apply topically as needed   Refills:  0       insulin glargine 100 UNIT/ML injection   Commonly known as:  LANTUS        Dose:  25 Units   Inject 25 Units Subcutaneous 2 times daily   Refills:  0       ipratropium - albuterol 0.5 mg/2.5 mg/3 mL 0.5-2.5 (3) MG/3ML neb solution   Commonly known as:  DUONEB   Used for:  Acute respiratory failure with hypoxia (H)        Dose:  1 vial   Take 1 vial (3 mLs) by nebulization every 6 hours as needed for shortness of breath / dyspnea or wheezing   Quantity:  90 vial   Refills:  1       irbesartan 300 MG tablet   Commonly known as:  AVAPRO   Used for:  Hypertension secondary to other renal disorders        Dose:  300 mg   Take 1 tablet (300 mg) by mouth At Bedtime   Quantity:  30 tablet   Refills:  0       iron sucrose 20 MG/ML injection   Commonly known as:  VENOFER        Dose:  50 mg   Inject 50 mg into the vein once a week WIth dialysis   Refills:  0       Isosorbide Mononitrate  MG Tb24   Used for:  Coronary artery disease involving native heart, angina presence unspecified, unspecified vessel or lesion type, Hypertension secondary to other renal disorders        Dose:  120 mg   Take 1 tablet (120 mg) by mouth every evening   Quantity:  30 tablet  "  Refills:  11       lamISIL AT 1 % cream   Generic drug:  terbinafine        Apply topically 2 times daily as needed   Refills:  0       * LYRICA 100 MG capsule   Generic drug:  pregabalin        Dose:  100 mg   Take 100 mg by mouth At Bedtime   Refills:  0       * LYRICA 25 MG capsule   Generic drug:  pregabalin        Dose:  25 mg   Take 25 mg by mouth On dialysis days in AM   Refills:  0       MAGNESIUM OXIDE PO        Dose:  400 mg   Take 400 mg by mouth At Bedtime   Refills:  0       mycophenolate 500 MG tablet   Commonly known as:  GENERIC EQUIVALENT   Used for:  ESRD (end stage renal disease) on dialysis (H)        Dose:  500 mg   Take 1 tablet (500 mg) by mouth 2 times daily On an empty stomach   Refills:  0       NEPRO PO        1-3 times daily   Refills:  0       nitroGLYcerin 0.4 MG sublingual tablet   Commonly known as:  NITROSTAT   Used for:  Coronary artery disease due to lipid rich plaque, Status post coronary angioplasty        One tablet under the tongue every 5 minutes if needed for chest pain. May repeat every 5 minutes for a maximum of 3 doses in 15 minutes\"   Quantity:  25 tablet   Refills:  3       omega-3 acid ethyl esters 1 g capsule   Commonly known as:  Lovaza        Dose:  2 g   Take 2 g by mouth 2 times daily   Refills:  0       order for DME   Used for:  SOB (shortness of breath), Generalized muscle weakness        Equipment being ordered: Other: 4WW Treatment Diagnosis: Decreased activity tolerance   Quantity:  1 each   Refills:  0       PREZISTA PO        Dose:  800 mg   Take 800 mg by mouth At Bedtime.   Refills:  0       priLOSEC 40 MG capsule   Generic drug:  omeprazole        Dose:  40 mg   Take 40 mg by mouth daily 1 hour before dinner   Refills:  0       ritonavir 100 MG capsule   Commonly known as:  NORVIR        Dose:  1 capsule   Take 1 capsule by mouth At Bedtime   Refills:  0       TYLENOL PO        Take by mouth as needed for mild pain or fever   Refills:  0       VELPHORO " 500 MG Chew chewable tablet   Generic drug:  sucroferric oxyhydroxide        Dose:  500 mg   Take 500 mg by mouth 3 times daily (with meals)   Refills:  0       * Notice:  This list has 2 medication(s) that are the same as other medications prescribed for you. Read the directions carefully, and ask your doctor or other care provider to review them with you.             Protect others around you: Learn how to safely use, store and throw away your medicines at www.disposemymeds.org.             Medication List: This is a list of all your medications and when to take them. Check marks below indicate your daily home schedule. Keep this list as a reference.      Medications           Morning Afternoon Evening Bedtime As Needed    abacavir 300 MG tablet   Commonly known as:  ZIAGEN   Take 600 mg by mouth every evening   Last time this was given:  600 mg on 7/13/2018  7:36 PM                                albuterol 108 (90 Base) MCG/ACT Inhaler   Commonly known as:  PROAIR HFA/PROVENTIL HFA/VENTOLIN HFA   Inhale 2 puffs into the lungs every 6 hours as needed for shortness of breath / dyspnea or wheezing                                aspirin 81 MG EC tablet   Take 1 tablet (81 mg) by mouth daily   Last time this was given:  81 mg on 7/14/2018  1:45 PM                                atorvastatin 40 MG tablet   Commonly known as:  LIPITOR   Take 40 mg by mouth At Bedtime                                B COMPLEX-C-FOLIC ACID PO   Take 1 tablet by mouth At Bedtime                                carvedilol 12.5 MG tablet   Commonly known as:  COREG   Take 1 tablet (12.5 mg) by mouth 2 times daily (with meals)   Last time this was given:  12.5 mg on 7/14/2018  1:46 PM                                chlorhexidine 0.12 % solution   Commonly known as:  PERIDEX   Rinse and gargle 15 ml by mouth twice a day as directed.                                CLONAZEPAM PO   Take 0.5 mg by mouth nightly as needed for anxiety (restless legs)                                 CLOPIDOGREL BISULFATE PO   Take 75 mg by mouth every evening   Last time this was given:  75 mg on 7/13/2018  7:36 PM                                dapsone 100 MG tablet   Take 100 mg by mouth At Bedtime   Last time this was given:  100 mg on 7/13/2018 10:12 PM                                dolutegravir 50 MG tablet   Commonly known as:  TIVICAY   Take 50 mg by mouth At Bedtime   Last time this was given:  50 mg on 7/13/2018 10:13 PM                                DOXERCALCIFEROL IV   Inject 6 mcg into the vein three times a week (with dialysis)   Last time this was given:  2 mcg on 7/14/2018  8:49 AM                                epoetin brittni 15353 UNIT/ML injection   Commonly known as:  EPOGEN/PROCRIT   Inject 11,000 Units Subcutaneous three times a week WITH DIALYSIS   Last time this was given:  10,000 Units on 7/14/2018  8:49 AM                                guaiFENesin 600 MG 12 hr tablet   Commonly known as:  MUCINEX   Take by mouth as needed for congestion                                HUMALOG PEN SC   Take 15 units TID and an additional 2 units if BG is over 150                                hydrALAZINE 25 MG tablet   Commonly known as:  APRESOLINE   Take 1 tablet (25 mg) by mouth 3 times daily   Last time this was given:  25 mg on 7/14/2018  1:45 PM                                imiquimod 5 % cream   Commonly known as:  ALDARA   Apply topically as needed                                insulin glargine 100 UNIT/ML injection   Commonly known as:  LANTUS   Inject 25 Units Subcutaneous 2 times daily                                ipratropium - albuterol 0.5 mg/2.5 mg/3 mL 0.5-2.5 (3) MG/3ML neb solution   Commonly known as:  DUONEB   Take 1 vial (3 mLs) by nebulization every 6 hours as needed for shortness of breath / dyspnea or wheezing                                irbesartan 300 MG tablet   Commonly known as:  AVAPRO   Take 1 tablet (300 mg) by mouth At Bedtime   Last  "time this was given:  300 mg on 7/13/2018 10:12 PM                                iron sucrose 20 MG/ML injection   Commonly known as:  VENOFER   Inject 50 mg into the vein once a week WIth dialysis                                Isosorbide Mononitrate  MG Tb24   Take 1 tablet (120 mg) by mouth every evening   Last time this was given:  120 mg on 7/13/2018  7:36 PM                                lamISIL AT 1 % cream   Apply topically 2 times daily as needed   Generic drug:  terbinafine                                * LYRICA 100 MG capsule   Take 100 mg by mouth At Bedtime   Generic drug:  pregabalin                                * LYRICA 25 MG capsule   Take 25 mg by mouth On dialysis days in AM   Generic drug:  pregabalin                                MAGNESIUM OXIDE PO   Take 400 mg by mouth At Bedtime                                mycophenolate 500 MG tablet   Commonly known as:  GENERIC EQUIVALENT   Take 1 tablet (500 mg) by mouth 2 times daily On an empty stomach                                NEPRO PO   1-3 times daily                                nitroGLYcerin 0.4 MG sublingual tablet   Commonly known as:  NITROSTAT   One tablet under the tongue every 5 minutes if needed for chest pain. May repeat every 5 minutes for a maximum of 3 doses in 15 minutes\"                                omega-3 acid ethyl esters 1 g capsule   Commonly known as:  Lovaza   Take 2 g by mouth 2 times daily                                order for DME   Equipment being ordered: Other: 4WW Treatment Diagnosis: Decreased activity tolerance                                PREZISTA PO   Take 800 mg by mouth At Bedtime.   Last time this was given:  800 mg on 7/13/2018 10:12 PM                                priLOSEC 40 MG capsule   Take 40 mg by mouth daily 1 hour before dinner   Last time this was given:  40 mg on 7/14/2018  1:45 PM   Generic drug:  omeprazole                                ritonavir 100 MG capsule   Commonly " known as:  NORVIR   Take 1 capsule by mouth At Bedtime   Last time this was given:  100 mg on 7/13/2018 10:19 PM                                TYLENOL PO   Take by mouth as needed for mild pain or fever   Last time this was given:  650 mg on 7/14/2018 10:19 AM                                VELPHORO 500 MG Chew chewable tablet   Take 500 mg by mouth 3 times daily (with meals)   Generic drug:  sucroferric oxyhydroxide                                * Notice:  This list has 2 medication(s) that are the same as other medications prescribed for you. Read the directions carefully, and ask your doctor or other care provider to review them with you.

## 2018-07-12 NOTE — ED TRIAGE NOTES
Pt arrives to the ED for increased generalized weakness that started yesterday. Pt states that he developed a cough yesterday as well, denies SOB. Denies any fevers. Normally uses a cane at home.

## 2018-07-13 LAB
ABO + RH BLD: NORMAL
ABO + RH BLD: NORMAL
ANION GAP SERPL CALCULATED.3IONS-SCNC: 9 MMOL/L (ref 3–14)
BLD GP AB SCN SERPL QL: NORMAL
BLD PROD TYP BPU: NORMAL
BLOOD BANK CMNT PATIENT-IMP: NORMAL
BUN SERPL-MCNC: 39 MG/DL (ref 7–30)
CALCIUM SERPL-MCNC: 9 MG/DL (ref 8.5–10.1)
CHLORIDE SERPL-SCNC: 103 MMOL/L (ref 94–109)
CO2 SERPL-SCNC: 26 MMOL/L (ref 20–32)
CREAT SERPL-MCNC: 6.76 MG/DL (ref 0.66–1.25)
ERYTHROCYTE [DISTWIDTH] IN BLOOD BY AUTOMATED COUNT: 14.5 % (ref 10–15)
GFR SERPL CREATININE-BSD FRML MDRD: 8 ML/MIN/1.7M2
GLUCOSE BLDC GLUCOMTR-MCNC: 152 MG/DL (ref 70–99)
GLUCOSE BLDC GLUCOMTR-MCNC: 155 MG/DL (ref 70–99)
GLUCOSE BLDC GLUCOMTR-MCNC: 158 MG/DL (ref 70–99)
GLUCOSE BLDC GLUCOMTR-MCNC: 203 MG/DL (ref 70–99)
GLUCOSE BLDC GLUCOMTR-MCNC: 257 MG/DL (ref 70–99)
GLUCOSE SERPL-MCNC: 114 MG/DL (ref 70–99)
HCT VFR BLD AUTO: 27.4 % (ref 40–53)
HGB BLD-MCNC: 8.3 G/DL (ref 13.3–17.7)
INTERPRETATION ECG - MUSE: NORMAL
INTERPRETATION ECG - MUSE: NORMAL
MCH RBC QN AUTO: 29.7 PG (ref 26.5–33)
MCHC RBC AUTO-ENTMCNC: 30.3 G/DL (ref 31.5–36.5)
MCV RBC AUTO: 98 FL (ref 78–100)
NUM BPU REQUESTED: 1
PLATELET # BLD AUTO: 103 10E9/L (ref 150–450)
POTASSIUM SERPL-SCNC: 3.9 MMOL/L (ref 3.4–5.3)
RBC # BLD AUTO: 2.79 10E12/L (ref 4.4–5.9)
SODIUM SERPL-SCNC: 138 MMOL/L (ref 133–144)
SPECIMEN EXP DATE BLD: NORMAL
WBC # BLD AUTO: 4.1 10E9/L (ref 4–11)

## 2018-07-13 PROCEDURE — G0378 HOSPITAL OBSERVATION PER HR: HCPCS

## 2018-07-13 PROCEDURE — A9270 NON-COVERED ITEM OR SERVICE: HCPCS | Mod: GY | Performed by: INTERNAL MEDICINE

## 2018-07-13 PROCEDURE — 86901 BLOOD TYPING SEROLOGIC RH(D): CPT | Performed by: HOSPITALIST

## 2018-07-13 PROCEDURE — 00000146 ZZHCL STATISTIC GLUCOSE BY METER IP

## 2018-07-13 PROCEDURE — 25000132 ZZH RX MED GY IP 250 OP 250 PS 637: Mod: GY | Performed by: HOSPITALIST

## 2018-07-13 PROCEDURE — 36415 COLL VENOUS BLD VENIPUNCTURE: CPT | Performed by: INTERNAL MEDICINE

## 2018-07-13 PROCEDURE — 96372 THER/PROPH/DIAG INJ SC/IM: CPT

## 2018-07-13 PROCEDURE — 80048 BASIC METABOLIC PNL TOTAL CA: CPT | Performed by: INTERNAL MEDICINE

## 2018-07-13 PROCEDURE — 25000132 ZZH RX MED GY IP 250 OP 250 PS 637: Mod: GY | Performed by: INTERNAL MEDICINE

## 2018-07-13 PROCEDURE — 96374 THER/PROPH/DIAG INJ IV PUSH: CPT

## 2018-07-13 PROCEDURE — 99226 ZZC SUBSEQUENT OBSERVATION CARE,LEVEL III: CPT | Performed by: HOSPITALIST

## 2018-07-13 PROCEDURE — 99207 ZZC CDG-CODE CATEGORY CHANGED: CPT | Performed by: HOSPITALIST

## 2018-07-13 PROCEDURE — 86900 BLOOD TYPING SEROLOGIC ABO: CPT | Performed by: HOSPITALIST

## 2018-07-13 PROCEDURE — 25000131 ZZH RX MED GY IP 250 OP 636 PS 637: Mod: GY | Performed by: INTERNAL MEDICINE

## 2018-07-13 PROCEDURE — 86850 RBC ANTIBODY SCREEN: CPT | Performed by: HOSPITALIST

## 2018-07-13 PROCEDURE — 86923 COMPATIBILITY TEST ELECTRIC: CPT | Performed by: HOSPITALIST

## 2018-07-13 PROCEDURE — 25000128 H RX IP 250 OP 636: Performed by: INTERNAL MEDICINE

## 2018-07-13 PROCEDURE — 85027 COMPLETE CBC AUTOMATED: CPT | Performed by: INTERNAL MEDICINE

## 2018-07-13 PROCEDURE — 36415 COLL VENOUS BLD VENIPUNCTURE: CPT | Performed by: HOSPITALIST

## 2018-07-13 RX ORDER — RITONAVIR 100 MG/1
100 TABLET ORAL AT BEDTIME
Status: DISCONTINUED | OUTPATIENT
Start: 2018-07-14 | End: 2018-07-14 | Stop reason: HOSPADM

## 2018-07-13 RX ORDER — AMLODIPINE BESYLATE 5 MG/1
5 TABLET ORAL ONCE
Status: COMPLETED | OUTPATIENT
Start: 2018-07-13 | End: 2018-07-13

## 2018-07-13 RX ORDER — HYDRALAZINE HYDROCHLORIDE 20 MG/ML
10 INJECTION INTRAMUSCULAR; INTRAVENOUS EVERY 4 HOURS PRN
Status: DISCONTINUED | OUTPATIENT
Start: 2018-07-13 | End: 2018-07-14 | Stop reason: HOSPADM

## 2018-07-13 RX ADMIN — AMLODIPINE BESYLATE 5 MG: 5 TABLET ORAL at 19:36

## 2018-07-13 RX ADMIN — HYDRALAZINE HYDROCHLORIDE 10 MG: 20 INJECTION INTRAMUSCULAR; INTRAVENOUS at 18:49

## 2018-07-13 RX ADMIN — DAPSONE 100 MG: 100 TABLET ORAL at 22:12

## 2018-07-13 RX ADMIN — INSULIN ASPART 1 UNITS: 100 INJECTION, SOLUTION INTRAVENOUS; SUBCUTANEOUS at 12:45

## 2018-07-13 RX ADMIN — IRBESARTAN 300 MG: 300 TABLET ORAL at 22:12

## 2018-07-13 RX ADMIN — MYCOPHENOLATE MOFETIL 500 MG: 250 CAPSULE ORAL at 08:14

## 2018-07-13 RX ADMIN — HYDRALAZINE HYDROCHLORIDE 25 MG: 25 TABLET ORAL at 22:13

## 2018-07-13 RX ADMIN — ABACAVIR 600 MG: 300 TABLET, FILM COATED ORAL at 19:36

## 2018-07-13 RX ADMIN — DARUNAVIR 800 MG: 800 TABLET, FILM COATED ORAL at 22:12

## 2018-07-13 RX ADMIN — HYDRALAZINE HYDROCHLORIDE 25 MG: 25 TABLET ORAL at 08:13

## 2018-07-13 RX ADMIN — MYCOPHENOLATE MOFETIL 500 MG: 250 CAPSULE ORAL at 20:43

## 2018-07-13 RX ADMIN — CARVEDILOL 12.5 MG: 12.5 TABLET, FILM COATED ORAL at 08:13

## 2018-07-13 RX ADMIN — INSULIN ASPART 3 UNITS: 100 INJECTION, SOLUTION INTRAVENOUS; SUBCUTANEOUS at 18:51

## 2018-07-13 RX ADMIN — Medication 1 LOZENGE: at 19:49

## 2018-07-13 RX ADMIN — DOLUTEGRAVIR SODIUM 50 MG: 50 TABLET, FILM COATED ORAL at 22:13

## 2018-07-13 RX ADMIN — RITONAVIR 100 MG: 100 TABLET, FILM COATED ORAL at 00:25

## 2018-07-13 RX ADMIN — RITONAVIR 100 MG: 100 TABLET, FILM COATED ORAL at 22:19

## 2018-07-13 RX ADMIN — ACETAMINOPHEN 650 MG: 325 TABLET, FILM COATED ORAL at 19:22

## 2018-07-13 RX ADMIN — OMEPRAZOLE 40 MG: 20 CAPSULE, DELAYED RELEASE ORAL at 08:13

## 2018-07-13 RX ADMIN — CLOPIDOGREL 75 MG: 75 TABLET, FILM COATED ORAL at 19:36

## 2018-07-13 RX ADMIN — INSULIN GLARGINE 50 UNITS: 100 INJECTION, SOLUTION SUBCUTANEOUS at 20:25

## 2018-07-13 RX ADMIN — HYDRALAZINE HYDROCHLORIDE 25 MG: 25 TABLET ORAL at 15:36

## 2018-07-13 RX ADMIN — CARVEDILOL 12.5 MG: 12.5 TABLET, FILM COATED ORAL at 18:10

## 2018-07-13 RX ADMIN — DARUNAVIR 800 MG: 800 TABLET, FILM COATED ORAL at 00:09

## 2018-07-13 RX ADMIN — Medication 1 LOZENGE: at 08:13

## 2018-07-13 RX ADMIN — ASPIRIN 81 MG: 81 TABLET, COATED ORAL at 08:13

## 2018-07-13 RX ADMIN — ISOSORBIDE MONONITRATE 120 MG: 60 TABLET, EXTENDED RELEASE ORAL at 19:36

## 2018-07-13 NOTE — PROGRESS NOTES
Renal Medicine       Outpatient dialysis parameters reviewed  [facility] per Dr. Chavez    Dialysis orders placed for am         Recent Labs  Lab 07/13/18  0656      POTASSIUM 3.9   CHLORIDE 103   CO2 26   ANIONGAP 9   *   BUN 39*   CR 6.76*   GFRESTIMATED 8*   GFRESTBLACK 10*   PRABHA 9.0           JAMAL Fuller    [facility]  [phone]

## 2018-07-13 NOTE — PLAN OF CARE
Problem: Patient Care Overview  Goal: Plan of Care/Patient Progress Review  Outcome: No Change   07/13/18 0018   OTHER   Plan Of Care Reviewed With patient   Plan of Care Review   Progress no change     Physical assessment WDL w/exceptions as noted on OBS Adult PCS flowsheet. Refer to VS, I/O, OBS Adult PCS for assessment & shift details. Disposition anticipated for return to home tomorrow pending improved strength.  MELVI LinN, RN  Medical/Telemetry - 3         Problem: Hemodialysis (Adult)  Goal: Signs and Symptoms of Listed Potential Problems Will be Absent, Minimized or Managed (Hemodialysis)  Signs and symptoms of listed potential problems will be absent, minimized or managed by discharge/transition of care (reference Hemodialysis (Adult) CPG).  Outcome: No Change   07/13/18 0018   Hemodialysis   Problems Assessed (Hemodialysis) all   Problems Present (Hemodialysis) cardiovascular complications;fluid imbalance;infection;situational response       Problem: Diabetes Comorbidity  Goal: Diabetes  Patient comorbidity will be monitored for signs and symptoms of hyperglycemia or hypoglycemia. Problems will be absent, minimized or managed by discharge/transition of care.  Outcome: No Change  Insulin dependent DM-2 w/BG range since admission today of 131-223 mg/dL. A1C has not been done in > 1 year D/T results being inaccurate in HD patients D/T low amount of mature RBCs.    Problem: Cardiac Disease Comorbidity  Goal: Cardiac Disease  Patient comorbidity will be monitored for signs and symptoms of Cardiac Disease.  Problems will be absent, minimized or managed by discharge/transition of care.  Outcome: No Change  CAD, HTN, HLD, NSTEMI, Angio w/stent. Refer to VS flowsheet for BP & P

## 2018-07-13 NOTE — PROGRESS NOTES
SPIRITUAL HEALTH SERVICES  SPIRITUAL ASSESSMENT Progress Note  [facility] 3 Med Surg    PRIMARY FOCUS:     Goals of care    Symptom/pain management    Emotional/spiritual/Hinduism distress    Support for coping    ILLNESS CIRCUMSTANCES:   Reviewed documentation. Reflective conversation shared with pt, Donald which integrated elements of illness and family narratives.     Context of Serious Illness/Symptom(s) - Donald discussed his dialysis, placement of stents and difficulties with diet.    Resources for Support - Family    DISTRESS:     Emotional/Existential/Relational Distress - Donald' health limits his activities and his diet is complicated.    Spiritual/Baptism Distress - None expressed    Social/Cultural/Economic Distress - None expressed    SPIRITUAL/Amish COPING:     Buddhism/Shania - Scientologist    Spiritual Practice(s) - Prayer was offered and welcomed    Emotional/Existential/Relational Connections - Donald' family is very important to him.    GOALS OF CARE:    Goals of Care - Not discussed.    Meaning/Sense-Making -   o Is proud of his [place] upbringing.  o Shared pictures of his grandchildren and told stories about them.  o Spoke of his various vocations while in the [place].  o Has a great sense of humor and still speaks with an accent.     PLAN: No further plans as pt. expects to be discharged tomorrow after dialysis..      Colleen Hernandez   Intern  Pager [phone]

## 2018-07-13 NOTE — PROGRESS NOTES
PRIMARY DIAGNOSIS: GENERALIZED WEAKNESS    OUTPATIENT/OBSERVATION GOALS TO BE MET BEFORE DISCHARGE  1. Orthostatic performed: No    2. Tolerating PO medications: Yes    3. Return to near baseline physical activity: Yes    4. Cleared for discharge by consultants (if involved): No    Discharge Planner Nurse   Safe discharge environment identified: Yes  Barriers to discharge: No       Entered by: Marissa Iniguez 07/13/2018 1:41 AM     Please review provider order for any additional goals.   Nurse to notify provider when observation goals have been met and patient is ready for discharge.

## 2018-07-13 NOTE — H&P
[facility]  Hospitalist Admission Note  July 12, 2018  Name: Donald Raza    MRN: [id number]  YOB: 1950    Age: 68 year old  Date of admission: 7/12/2018  Primary care provider: Aida Thomas      ASSESSMENT AND PLAN:    Pt is a  68-year-old gentleman with a history of coronary artery disease, hypertension, hyperlipidemia, HIV, type 2 diabetes, and end-stage renal dialysis on scheduled Tuesdays, Thursdays, Saturdays, who presents here with generalized weakness.     1.  Generalized weakness:  No obvious objective findings at this point in time.  He is actually afebrile and moderately hypertensive.  UA was unremarkable for infection with the exception of some hyperglycemia and proteinuria, which is consistent with his previous history of diabetes.  Chest x-ray was negative.  CT of the head was otherwise unremarkable and EKG did not demonstrate any significant arrhythmia that would explain his symptoms.  At this time, we will monitor him overnight under observation.  If nothing pronounces itself, could conceivably discharge in the morning.   2.  Type 2 diabetes:  We will place him on a medium intensity sliding scale for now and resume his chronic Lantus.   3.  Hypertension:  Resume his chronic Avapro, Imdur and carvedilol.   4.  Coronary artery disease:  Resume medications as above in addition to aspirin.   5.  History of HIV:  Resume his highly active antiretroviral medications.  Okay to use the patient's own supply as the patient is under observation status.   6.  ESRD: on HD on T/R/Sat; completed full session today and if still inpt, could cst nephrology for inpt dialysis session scheduled for Sat     DISPOSITION:  Okay to home in the morning if patient feels better and nothing pronounces itself.     CHIEF COMPLAINT:  Generalized weakness.      HISTORY OF PRESENT ILLNESS:  The patient is a 68-year-old gentleman with a complex past medical history including coronary  artery disease, hypertension, hyperlipidemia, HIV, type 2 diabetes, and end-stage renal disease who presents here with generalized weakness.  He reported that yesterday evening, he felt weak with some associated generalized malaise.  He does not report a subjective fever and this morning, he slept through his alarm clock, which is unusual for him.  He was feeling fatigued and generally weak.  He did go to his usual dialysis in the morning, which is scheduled on Tuesdays, Thursdays and Saturdays.  He did complete his dialysis session, but presented to urgent care because he was feeling weak and unwell.  He had a sense of doom and felt that he should probably be monitored closely in the hospital setting in the event that he may rapidly decline, which has happened in the past given his underlying immune compromised state.  He does report a dry cough, but nonproductive.  On presentation to the ED, workup was fairly unremarkable.  He was afebrile, hemodynamically stable and moderately hypertensive. Labs only demonstrated a hemoglobin of 9.2 and he was mildly hyperglycemic.  The patient will be admitted under observation for watchful waiting.        I did discuss the case in detail with the ED physician.     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, recurrent     Bradycardia 5/28/2016     CAD S/P percutaneous coronary angioplasty 6/15/2015     Cancer (H)      Chest pain      CKD (chronic kidney disease)     Hemodialysis     Dilated aortic root (H) 5/6/2016     ESRD (end stage renal disease) on dialysis (H) 5/6/2016     Hemodialysis-associated hypotension 5/22/2016     Human immunodeficiency virus (HIV) disease      Hypertension 2010     Hypotension, unspecified hypotension type 5/22/2016     Impotence of organic origin      Increased  prostate specific antigen (PSA) velocity 08/08/2016    Awaiting bx on blood thinner     Mixed hyperlipidemia      Near syncope 2016    with hemodialysis     NSTEMI (non-ST elevated myocardial infarction) (H) 12/2015, 5/2016     Pulmonary HTN     Mod     TIA (transient ischemic attack) 5/2016     Type 2 diabetes mellitus (H) age 50     Unstable angina (H) 3/4/2016     Past Surgical History:     Past Surgical History:   Procedure Laterality Date     ANGIOGRAM  03-04-16    No culprit lesions, stents widely patent      ANGIOGRAM  05-06-16    Cutting balloon ptca=Diag     APPENDECTOMY  2000     CHOLECYSTECTOMY, LAPOROSCOPIC       COLOSTOMY  09/30/1999    Temporary for diverticulitis     HC LEFT HEART CATHETERIZATION  03/12/2018    No significant change  Elevated LVEDp  LVEF 30% No PCI     HEART CATH, ANGIOPLASTY  08-18-16    LAD PCI. Stented with a 3.0 x 8 mm Xience Alpine stent.     STENT, CORONARY, S660 15/18  12/2015    VANITA=Diag, PTCA=LAD     STENT, CORONARY, S660 15/18  06/2015    VANITA=LAD     Social History:     Social History   Substance Use Topics     Smoking status: Former Smoker     Packs/day: 2.00     Years: 41.00     Types: Cigarettes     Quit date: 2003     Smokeless tobacco: Never Used     Alcohol use No     Family History:  Family history reviewed. NO pertinent family history     Allergies:     Allergies   Allergen Reactions     Calcium Acetate Other (See Comments)     Other reaction(s): Other, see comments  Pain in chest and back  Pain in chest area (sensitive skin)      Diagnostic X-Ray Materials Other (See Comments)     PN: renal failure     Lisinopril      Sulfa Drugs      Medications:     Prescriptions Prior to Admission   Medication Sig Dispense Refill Last Dose     abacavir (ZIAGEN) 300 MG tablet Take 600 mg by mouth every evening    7/11/2018 at Unknown time     Acetaminophen (TYLENOL PO) Take by mouth as needed for mild pain or fever   7/12/2018 at Unknown time     albuterol (PROAIR HFA/PROVENTIL  HFA/VENTOLIN HFA) 108 (90 BASE) MCG/ACT Inhaler Inhale 2 puffs into the lungs every 6 hours as needed for shortness of breath / dyspnea or wheezing 1 Inhaler 1 Taking     aspirin EC 81 MG EC tablet Take 1 tablet (81 mg) by mouth daily 30 tablet 0 7/11/2018 at hs     atorvastatin (LIPITOR) 40 MG tablet Take 40 mg by mouth At Bedtime   7/11/2018 at Unknown time     B COMPLEX-C-FOLIC ACID PO Take 1 tablet by mouth At Bedtime    7/11/2018 at Unknown time     carvedilol (COREG) 12.5 MG tablet Take 1 tablet (12.5 mg) by mouth 2 times daily (with meals) 60 tablet 0 7/11/2018 at Unknown time     chlorhexidine (CHLORHEXIDINE) 0.12 % solution Rinse and gargle 15 ml by mouth twice a day as directed.   7/11/2018 at Unknown time     CLONAZEPAM PO Take 0.5 mg by mouth nightly as needed for anxiety (restless legs)   Taking     CLOPIDOGREL BISULFATE PO Take 75 mg by mouth every evening    7/11/2018 at Unknown time     dapsone 100 MG tablet Take 100 mg by mouth At Bedtime    7/11/2018 at Unknown time     Darunavir Ethanolate (PREZISTA PO) Take 800 mg by mouth At Bedtime.   7/11/2018 at Unknown time     dolutegravir (TIVICAY) 50 MG tablet Take 50 mg by mouth At Bedtime   7/11/2018 at Unknown time     DOXERCALCIFEROL IV Inject 6 mcg into the vein three times a week (with dialysis)   Taking     epoetin brittni (EPOGEN,PROCRIT) 49627 UNIT/ML injection Inject 11,000 Units Subcutaneous three times a week WITH DIALYSIS   Taking     guaiFENesin (MUCINEX) 600 MG 12 hr tablet Take by mouth as needed for congestion   Taking     hydrALAZINE (APRESOLINE) 25 MG tablet Take 1 tablet (25 mg) by mouth 3 times daily 90 tablet 0 7/11/2018 at Unknown time     imiquimod (ALDARA) 5 % cream Apply topically as needed   Taking     insulin glargine (LANTUS) 100 UNIT/ML injection Inject 25 Units Subcutaneous 2 times daily    7/11/2018 at Unknown time     Insulin Lispro (HUMALOG PEN SC) Take 15 units TID and an additional 2 units if BG is over 150   Taking      "ipratropium - albuterol 0.5 mg/2.5 mg/3 mL (DUONEB) 0.5-2.5 (3) MG/3ML neb solution Take 1 vial (3 mLs) by nebulization every 6 hours as needed for shortness of breath / dyspnea or wheezing 90 vial 1 Taking     irbesartan (AVAPRO) 300 MG tablet Take 1 tablet (300 mg) by mouth At Bedtime 30 tablet 0 7/11/2018 at Unknown time     iron sucrose (VENOFER) 20 MG/ML injection Inject 50 mg into the vein once a week WIth dialysis   Taking     Isosorbide Mononitrate  MG TB24 Take 1 tablet (120 mg) by mouth every evening 30 tablet 11 7/11/2018 at Unknown time     MAGNESIUM OXIDE PO Take 400 mg by mouth At Bedtime   7/11/2018 at Unknown time     nitroglycerin (NITROSTAT) 0.4 MG SL tablet One tablet under the tongue every 5 minutes if needed for chest pain. May repeat every 5 minutes for a maximum of 3 doses in 15 minutes\" 25 tablet 3 Taking     Nutritional Supplements (NEPRO PO) 1-3 times daily   Taking     omega-3 acid ethyl esters (LOVAZA) 1 G capsule Take 2 g by mouth 2 times daily   7/11/2018 at Unknown time     omeprazole (PRILOSEC) 40 MG capsule Take 40 mg by mouth daily 1 hour before dinner   7/11/2018 at Unknown time     pregabalin (LYRICA) 100 MG capsule Take 100 mg by mouth At Bedtime        pregabalin (LYRICA) 25 MG capsule Take 25 mg by mouth On dialysis days in AM        ritonavir (NORVIR) 100 MG capsule Take 1 capsule by mouth At Bedtime    7/11/2018 at Unknown time     sucroferric oxyhydroxide (VELPHORO) 500 MG CHEW chewable tablet Take 500 mg by mouth 3 times daily (with meals)        terbinafine (LAMISIL AT) 1 % cream Apply topically 2 times daily as needed    Taking     mycophenolate (GENERIC EQUIVALENT) 500 MG tablet Take 1 tablet (500 mg) by mouth 2 times daily On an empty stomach   Not at this time, using when gets blood transfusion     order for DME Equipment being ordered: Other: 4WW  Treatment Diagnosis: Decreased activity tolerance 1 each 0 Unknown       Review of Systems:   A Comprehensive " "greater than 10 system review of systems was carried out.  Pertinent positives and negatives are noted above.  Otherwise negative for contributory information.        Physical Exam:  Blood pressure 151/72, pulse 78, temperature 98.3  F (36.8  C), temperature source Oral, resp. rate 16, height 1.803 m (5' 11\"), weight 84.6 kg (186 lb 6.4 oz), SpO2 95 %.  Gen: Pleasant in no acute distress.  HEENT: NCAT. EOMI. PERRL.  Neck: Normal inspection. No bruit, JVD or thyromegaly.  Lungs: Normal respiratory effort. Clear to auscultation bilaterally with no crackles or wheezes.  Card: N s1s2. RRR. No M/R/G.  Peripheral pulses present and symmetric.   Abd: Soft NT ND. No mass. Normal bowel sounds.  Skin: No rash. Warm to the touch  Extr: No edema. CMS intact  Psychiatric: Patient alert oriented ×3.  Normal affect  Neurologic: Cranial nerves II-XII are intact.  Sensation normal.  Motor strength 5/5    Data:       Recent Labs  Lab 07/12/18  1530   WBC 4.7   HGB 9.2*   HCT 29.7*   MCV 98   *       Recent Labs  Lab 07/12/18  1530      POTASSIUM 4.1   CHLORIDE 98   CO2 30   ANIONGAP 8   *   BUN 25   CR 5.30*   GFRESTIMATED 11*   GFRESTBLACK 13*   PRABHA 9.2   PROTTOTAL 8.1   ALBUMIN 4.0   BILITOTAL 0.6   ALKPHOS 127   AST 12   ALT 22       Imaging:   Recent Results (from the past 24 hour(s))   CT Head w/o Contrast    Narrative    CT OF THE HEAD WITHOUT CONTRAST 7/12/2018 4:36 PM     COMPARISON: Head CT 4/15/2017    HISTORY: Weakness.     TECHNIQUE: 5 mm thick axial CT images of the head were acquired  without IV contrast material.    FINDINGS: There is mild diffuse cerebral volume loss. There are subtle  patchy areas of decreased density in the cerebral white matter  bilaterally that are consistent with sequela of chronic small vessel  ischemic disease.    The ventricles and basal cisterns are within normal limits in  configuration given the degree of cerebral volume loss. There is no  midline shift. There are no " extra-axial fluid collections.    No intracranial hemorrhage, mass or recent infarct.    The visualized paranasal sinuses are well-aerated. There is no  mastoiditis. There are no fractures of the visualized bones.      Impression    IMPRESSION: Diffuse cerebral volume loss and cerebral white matter  changes consistent with chronic small vessel ischemic disease. No  evidence for acute intracranial pathology.        Radiation dose for this scan was reduced using automated exposure  control, adjustment of the mA and/or kV according to patient size, or  iterative reconstruction technique.    MIKEY GOLDSTEIN MD   XR Chest 2 Views    Narrative    CHEST TWO VIEWS   2018 4:44 PM    HISTORY: Cough, weakness.     COMPARISON: 3/9/2018      Impression    IMPRESSION: There has been improvement of what is now only mild  diffuse interstitial prominence, likely representing vascular  congestion or pulmonary edema. No other abnormality or change is seen.  The heart size is normal and no pleural effusion is seen.     MD Jose De Jesus RODRIGUES MD Pager [phone]              JOSE DE JESUS KAUR MD             D: 2018   T: 2018   MT:       Name:     GREGORIO BRUSH   MRN:      [id number]        Account:      [id number]   :      1950        Admitted:     2018                   Document: [id number]

## 2018-07-13 NOTE — PLAN OF CARE
Problem: Patient Care Overview  Goal: Plan of Care/Patient Progress Review  Outcome: Improving  PRIMARY DIAGNOSIS: GENERALIZED WEAKNESS    OUTPATIENT/OBSERVATION GOALS TO BE MET BEFORE DISCHARGE  1. Orthostatic performed: No    2. Tolerating PO medications: Yes    3. Return to near baseline physical activity: Yes    4. Cleared for discharge by consultants (if involved): No    Discharge Planner Nurse   Safe discharge environment identified: Yes  Barriers to discharge: No       Entered by: Marissa Iniguez 07/13/2018 5:09 AM     Please review provider order for any additional goals.   Nurse to notify provider when observation goals have been met and patient is ready for discharge.    VSS. A&Ox4. LS clear, equal bilateral. AV fistula felt with palpable thrill and audible bruit. Bedtime . On plavix for anticoagulation, antiretroviral medications ordered from pharmacy and administered. Consistent CHO diet, SBA with cane. Anticipated discharge home in <2 days.     Marissa Iniguez RN

## 2018-07-13 NOTE — PROGRESS NOTES
[facility]    Hospitalist Progress Note    Date of Service (when I saw the patient): 07/13/2018  Provider:  Eamon Brady MD     Initial presenting complaint/issue to hospital (Diagnosis): weakness  Assessment & Plan   Pt is a  68-year-old gentleman with a history of coronary artery disease, hypertension, hyperlipidemia, HIV, type 2 diabetes, and end-stage renal dialysis on scheduled Tuesdays, Thursdays, Saturdays, who presents here with generalized weakness.      1.  Acute on chronic symptomatic anemia with secondary generalized weakness: Transfuse one unit of PRCB since he is refractory to current Fe supp and EPO, becoming more transfusion dependent. He will stay overnight due to minor issues with blood in the past.     2.  Type 2 diabetes:  Dialysis diet and a medium intensity sliding scale for now and resume his chronic Lantus.   3.  Hypertension: Continue  Avapro, Imdur and carvedilol.   4.  Coronary artery disease: Aspirin,  Avapro, Imdur and carvedilol.   5.  History of HIV:  Continue ART as PTA using patient's own supply.   6.  ESRD: on HD on T/R/Sat; Nephrology inpt appct.  dialysis session scheduled for Sat       # Pain Assessment:  Current Pain Score 7/13/2018   Patient currently in pain? denies   Pain score (0-10) 0   Pain location -   Pain descriptors -   CPOT pain score -   Donald s pain level was assessed and he currently denies pain.        DVT Prophylaxis: Pneumatic Compression Devices  Code Status: Full Code    Disposition: Expected discharge in 24 hour after dialysis.    Interval History   Patient is reporting dyspnea and palpitations on exertion with generalized weakness, lack of energy similar as preceding drops of hgb requiring transfusions. No evidence of bleeding.     -Data reviewed today: I reviewed all new labs and imaging results over the last 24 hours. I personally reviewed the EKG tracing showing NSR with incomplete RBB and some evidence of LVH .    Physical Exam   Temp:  97.5  F (36.4  C) Temp src: Oral BP: 167/82 Pulse: 78 Heart Rate: 67 Resp: 16 SpO2: 96 % O2 Device: None (Room air)    Vitals:    07/12/18 1520 07/12/18 1857   Weight: 84 kg (185 lb 3 oz) 84.6 kg (186 lb 6.4 oz)     Vital Signs with Ranges  Temp:  [97.5  F (36.4  C)-98.3  F (36.8  C)] 97.5  F (36.4  C)  Pulse:  [78] 78  Heart Rate:  [61-79] 67  Resp:  [16-18] 16  BP: (151-193)/(72-89) 167/82  SpO2:  [95 %-96 %] 96 %  I/O last 3 completed shifts:  In: 400 [P.O.:400]  Out: -     GEN:  Alert, oriented x 3, appears comfortable, NAD.  HEENT:  Normocephalic/atraumatic, no scleral icterus, no nasal discharge, mouth moist.  CV:  Regular rate and rhythm, no murmur or JVD.  S1 + S2 noted, no S3 or S4.  LUNGS:  Clear to auscultation bilaterally without rales/rhonchi/wheezing/retractions.  Symmetric chest rise on inhalation noted.  ABD:  Active bowel sounds, soft, non-tender/non-distended.  No rebound/guarding/rigidity.  EXT:  No edema or cyanosis.  No joint synovitis noted.  SKIN:  Dry to touch, no exanthems noted in the visualized areas.       Medications     sodium chloride         - MEDICATION INSTRUCTIONS for Dialysis Patients -   Does not apply See Admin Instructions     abacavir  600 mg Oral QPM     aspirin  81 mg Oral Daily     carvedilol  12.5 mg Oral BID w/meals     clopidogrel (PLAVIX) tablet 75 mg  75 mg Oral QPM     dapsone  100 mg Oral At Bedtime     darunavir  800 mg Oral At Bedtime     dolutegravir  50 mg Oral At Bedtime     hydrALAZINE  25 mg Oral TID     insulin aspart  1-7 Units Subcutaneous TID AC     insulin aspart  1-5 Units Subcutaneous At Bedtime     insulin glargine  50 Units Subcutaneous QPM     irbesartan  300 mg Oral At Bedtime     isosorbide mononitrate  120 mg Oral QPM     mycophenolate  500 mg Oral BID     omeprazole  40 mg Oral Daily     ritonavir  100 mg Oral At Bedtime       Data     Recent Labs  Lab 07/13/18  0656 07/12/18  1530   WBC 4.1 4.7   HGB 8.3* 9.2*   MCV 98 98   * 126*   NA  138 136   POTASSIUM 3.9 4.1   CHLORIDE 103 98   CO2 26 30   BUN 39* 25   CR 6.76* 5.30*   ANIONGAP 9 8   PRABHA 9.0 9.2   * 223*   ALBUMIN  --  4.0   PROTTOTAL  --  8.1   BILITOTAL  --  0.6   ALKPHOS  --  127   ALT  --  22   AST  --  12   TROPI  --  <0.015       No results found for this or any previous visit (from the past 24 hour(s)).    Disclaimer: This note consists of symbols derived from keyboarding, dictation and/or voice recognition software. As a result, there may be errors in the script that have gone undetected. Please consider this when interpreting information found in this chart.

## 2018-07-13 NOTE — PROGRESS NOTES
PRIMARY DIAGNOSIS: GENERALIZED WEAKNESS    OUTPATIENT/OBSERVATION GOALS TO BE MET BEFORE DISCHARGE  1. Orthostatic performed: No    2. Tolerating PO medications: Yes    3. Return to near baseline physical activity: Yes    4. Cleared for discharge by consultants (if involved): No, new admission to hospital.     Pt has ESKD on HD. He had a HD run earlier this morning. C/O generalized weakness & fatigue started last evening & continued through his dialysis run. He went to  & was sent to ED. In the ED he had a CXR, head CT both negative for any acute issues. Pt has a lengthy PMH including DM2, HIV/AIDS, NSTEMI, CAD with S/P angio w/stent placement, & unstable angina. Pt well know to our facility & staff.    Discharge Planner Nurse   Safe discharge environment identified: None identified  Barriers to discharge: Yes, identify etiology for generalized weakness       Entered by: Geetha Li 07/12/2018 8:57 PM     Please review provider order for any additional goals.   Nurse to notify provider when observation goals have been met and patient is ready for discharge.  Geetha Li, BSN, RN  Medical/Telemetry - 3

## 2018-07-13 NOTE — PLAN OF CARE
Problem: Patient Care Overview  Goal: Plan of Care/Patient Progress Review  PRIMARY DIAGNOSIS: GENERALIZED WEAKNESS    OUTPATIENT/OBSERVATION GOALS TO BE MET BEFORE DISCHARGE  1. Orthostatic performed: N/A    2. Tolerating PO medications: Yes    3. Return to near baseline physical activity: Yes    4. Cleared for discharge by consultants (if involved): No    Discharge Planner Nurse   Safe discharge environment identified: Yes  Barriers to discharge: Yes       Entered by: Marissa Beebe 07/13/2018 4:47 PM     Please review provider order for any additional goals.   Nurse to notify provider when observation goals have been met and patient is ready for discharge.

## 2018-07-14 VITALS
SYSTOLIC BLOOD PRESSURE: 149 MMHG | BODY MASS INDEX: 26.73 KG/M2 | DIASTOLIC BLOOD PRESSURE: 74 MMHG | RESPIRATION RATE: 20 BRPM | HEIGHT: 71 IN | TEMPERATURE: 98 F | WEIGHT: 190.92 LBS | OXYGEN SATURATION: 96 % | HEART RATE: 69 BPM

## 2018-07-14 LAB
BLD PROD TYP BPU: NORMAL
BLD UNIT ID BPU: 0
BLOOD PRODUCT CODE: NORMAL
BPU ID: NORMAL
GLUCOSE BLDC GLUCOMTR-MCNC: 112 MG/DL (ref 70–99)
GLUCOSE BLDC GLUCOMTR-MCNC: 143 MG/DL (ref 70–99)
GLUCOSE BLDC GLUCOMTR-MCNC: 85 MG/DL (ref 70–99)
TRANSFUSION STATUS PATIENT QL: NORMAL
TRANSFUSION STATUS PATIENT QL: NORMAL

## 2018-07-14 PROCEDURE — G0378 HOSPITAL OBSERVATION PER HR: HCPCS

## 2018-07-14 PROCEDURE — 99217 ZZC OBSERVATION CARE DISCHARGE: CPT | Performed by: HOSPITALIST

## 2018-07-14 PROCEDURE — 90937 HEMODIALYSIS REPEATED EVAL: CPT

## 2018-07-14 PROCEDURE — 63400005 ZZH RX 634: Performed by: INTERNAL MEDICINE

## 2018-07-14 PROCEDURE — P9016 RBC LEUKOCYTES REDUCED: HCPCS | Performed by: HOSPITALIST

## 2018-07-14 PROCEDURE — 25000128 H RX IP 250 OP 636: Performed by: INTERNAL MEDICINE

## 2018-07-14 PROCEDURE — 25000131 ZZH RX MED GY IP 250 OP 636 PS 637: Mod: GY | Performed by: INTERNAL MEDICINE

## 2018-07-14 PROCEDURE — 25000132 ZZH RX MED GY IP 250 OP 250 PS 637: Mod: GY | Performed by: INTERNAL MEDICINE

## 2018-07-14 PROCEDURE — 00000146 ZZHCL STATISTIC GLUCOSE BY METER IP

## 2018-07-14 PROCEDURE — A9270 NON-COVERED ITEM OR SERVICE: HCPCS | Mod: GY | Performed by: INTERNAL MEDICINE

## 2018-07-14 PROCEDURE — G0257 UNSCHED DIALYSIS ESRD PT HOS: HCPCS

## 2018-07-14 PROCEDURE — 99207 ZZC CDG-CODE CATEGORY CHANGED: CPT | Performed by: HOSPITALIST

## 2018-07-14 RX ORDER — DOXERCALCIFEROL 4 UG/2ML
2 INJECTION INTRAVENOUS
Status: COMPLETED | OUTPATIENT
Start: 2018-07-14 | End: 2018-07-14

## 2018-07-14 RX ADMIN — ASPIRIN 81 MG: 81 TABLET, COATED ORAL at 13:45

## 2018-07-14 RX ADMIN — SODIUM CHLORIDE 250 ML: 9 INJECTION, SOLUTION INTRAVENOUS at 09:21

## 2018-07-14 RX ADMIN — CARVEDILOL 12.5 MG: 12.5 TABLET, FILM COATED ORAL at 13:46

## 2018-07-14 RX ADMIN — HYDRALAZINE HYDROCHLORIDE 25 MG: 25 TABLET ORAL at 13:45

## 2018-07-14 RX ADMIN — DOXERCALCIFEROL 2 MCG: 4 INJECTION, SOLUTION INTRAVENOUS at 08:49

## 2018-07-14 RX ADMIN — ACETAMINOPHEN 650 MG: 325 TABLET, FILM COATED ORAL at 10:19

## 2018-07-14 RX ADMIN — ERYTHROPOIETIN 10000 UNITS: 10000 INJECTION, SOLUTION INTRAVENOUS; SUBCUTANEOUS at 08:49

## 2018-07-14 RX ADMIN — OMEPRAZOLE 40 MG: 20 CAPSULE, DELAYED RELEASE ORAL at 13:45

## 2018-07-14 RX ADMIN — ACETAMINOPHEN 650 MG: 325 TABLET, FILM COATED ORAL at 02:06

## 2018-07-14 RX ADMIN — MYCOPHENOLATE MOFETIL 500 MG: 250 CAPSULE ORAL at 13:45

## 2018-07-14 NOTE — PROGRESS NOTES
PRIMARY DIAGNOSIS: GENERALIZED WEAKNESS    OUTPATIENT/OBSERVATION GOALS TO BE MET BEFORE DISCHARGE  1. Orthostatic performed: No    2. Tolerating PO medications: Yes    3. Return to near baseline physical activity: Yes    4. Cleared for discharge by consultants (if involved): Yes    Discharge Planner Nurse   Safe discharge environment identified: Yes  Barriers to discharge: Yes       Entered by: Crystal Mustafa 07/14/2018 2:47 PM     Please review provider order for any additional goals.   Nurse to notify provider when observation goals have been met and patient is ready for discharge.    Pt a/o x 4; VSS, pt denies pain, dizziness, n/v & SOB. Pt reports headache while at dialysis, administered Tylenol. BS 85, 114. Dialysis today, with 1 unit blood, possible d/c today. Will continue with plan of care.

## 2018-07-14 NOTE — PLAN OF CARE
Problem: Patient Care Overview  Goal: Plan of Care/Patient Progress Review  Outcome: No Change  PRIMARY DIAGNOSIS: GENERALIZED WEAKNESS    OUTPATIENT/OBSERVATION GOALS TO BE MET BEFORE DISCHARGE  1. Orthostatic performed: No    2. Tolerating PO medications: Yes    3. Return to near baseline physical activity: Yes    4. Cleared for discharge by consultants (if involved): No    Discharge Planner Nurse   Safe discharge environment identified: Yes  Barriers to discharge: Yes       Entered by: Radha Menard 07/14/2018 2:47 AM     Please review provider order for any additional goals.   Nurse to notify provider when observation goals have been met and patient is ready for discharge.

## 2018-07-14 NOTE — PLAN OF CARE
"Problem: Patient Care Overview  Goal: Plan of Care/Patient Progress Review  Outcome: No Change  PRIMARY DIAGNOSIS: GENERALIZED WEAKNESS    OUTPATIENT/OBSERVATION GOALS TO BE MET BEFORE DISCHARGE  1. Orthostatic performed: No    2. Tolerating PO medications: Yes    3. Return to near baseline physical activity: Yes    4. Cleared for discharge by consultants (if involved): No    Discharge Planner Nurse   Safe discharge environment identified: Yes  Barriers to discharge: Yes. A&O. BP elevated, but decreased after norvasc one time dose, other VSS on RA. LS clear. Denies pain. Tylenol PRN given. Bruit/thrill present to L arm. C/o cough-PRN lozenge given. , 152, 143. Up ambulating the halls x1, SBA with cane. Nephrology following. Plan for hemodialysis today.     /68 (BP Location: Right arm)  Pulse 78  Temp 98.2  F (36.8  C) (Oral)  Resp 16  Ht 1.803 m (5' 11\")  Wt 86.6 kg (190 lb 14.4 oz)  SpO2 95%  BMI 26.63 kg/m2       Entered by: Radha eMnard 07/14/2018 6:07 AM     Please review provider order for any additional goals.   Nurse to notify provider when observation goals have been met and patient is ready for discharge.      "

## 2018-07-14 NOTE — PROGRESS NOTES
PRIMARY DIAGNOSIS: GENERALIZED WEAKNESS    OUTPATIENT/OBSERVATION GOALS TO BE MET BEFORE DISCHARGE  1. Orthostatic performed: No    2. Tolerating PO medications: Yes    3. Return to near baseline physical activity: Yes    4. Cleared for discharge by consultants (if involved): Yes    Discharge Planner Nurse   Safe discharge environment identified: Yes  Barriers to discharge: No       Entered by: Crystal Mustafa 07/14/2018 2:45 PM     Please review provider order for any additional goals.   Nurse to notify provider when observation goals have been met and patient is ready for discharge.    Pt a/o x 4; VSS, pt denies pain, headache, dizziness, n/v & SOB. BS 85, 114. Dialysis today, with 1 unit blood, possible d/c today. Will continue with plan of care.

## 2018-07-14 NOTE — PLAN OF CARE
Problem: Patient Care Overview  Goal: Plan of Care/Patient Progress Review  PRIMARY DIAGNOSIS: GENERALIZED WEAKNESS    OUTPATIENT/OBSERVATION GOALS TO BE MET BEFORE DISCHARGE  1. Orthostatic performed: N/A    2. Tolerating PO medications: Yes    3. Return to near baseline physical activity: Yes    4. Cleared for discharge by consultants (if involved): No    Discharge Planner Nurse   Safe discharge environment identified: Yes  Barriers to discharge: Yes       Entered by: Marissa Beebe 07/13/2018 6:59 PM     Please review provider order for any additional goals.   Nurse to notify provider when observation goals have been met and patient is ready for discharge.    MD paged for /88, PRN hydralazine ordered. BP recheck was 204/87, MD notified.

## 2018-07-14 NOTE — PLAN OF CARE
Problem: Patient Care Overview  Goal: Plan of Care/Patient Progress Review  Outcome: No Change  PRIMARY DIAGNOSIS: GENERALIZED WEAKNESS    OUTPATIENT/OBSERVATION GOALS TO BE MET BEFORE DISCHARGE  1. Orthostatic performed: No    2. Tolerating PO medications: Yes    3. Return to near baseline physical activity: Yes    4. Cleared for discharge by consultants (if involved): No    Discharge Planner Nurse   Safe discharge environment identified: Yes  Barriers to discharge: Yes. MD notified of /87, amlodipine one time dose ordered, BP currently 162/79.       Entered by: Radha Menard 07/13/2018 8:48 PM     Please review provider order for any additional goals.   Nurse to notify provider when observation goals have been met and patient is ready for discharge.

## 2018-07-14 NOTE — PROGRESS NOTES
DC instructions given to pt and family, verbalized understanding.  All belongings with pt, IV DC'd and documented.     Pt left unit via wheelchair.

## 2018-07-14 NOTE — PROGRESS NOTES
Potassium   Date Value Ref Range Status   07/13/2018 3.9 3.4 - 5.3 mmol/L Final   ]  Lab Results   Component Value Date    HGB 8.3 07/13/2018     Weight: 86.6 kg (190 lb 14.7 oz)  POST WT  DIALYSIS PROCEDURE NOTE  Hepatitis status of previous patient on machine log was checked and ok to use with this patient hepatitis status.  Patient dialyzed for 3.5 hrs on a 3 K bath with a  net fluid removal of 3 L.  A BFR of 450 ml/min was obtained via LUE AVF.  Patient was seen by  during treatment.  Total heparin received during treatment:: 0units.  Meds given:epogen 10,000 units, hectorol 2 mcg and 1 unit PRBC Complications:none   Procedure and ESRD teaching done and questions answered. Patient educated regarding procedure, patient verbalized understanding. See flowsheet in EPIC for further details and post assessment.  Machine water alarm in place and functioning.  CHLORINE AND CHLORAMINES CHECK on WATER SYSTEM EVERY 4 hours.   PT returned via wheelchair  Catheter Access: Aseptic prep done for both on/off.  Report received from: FRANCHESCA Menard RN  Report given to: BRANDY Mustafa RN  Outpatient Dialysis at [facility]

## 2018-07-14 NOTE — DISCHARGE SUMMARY
[facility]    Discharge Summary  Hospitalist    Date of Admission:  7/12/2018  Date of Discharge:  7/14/2018  Provider:  Eamon Brady MD  Date of Service (when I last saw the patient): 07/14/18    Discharge Diagnoses   1.  Acute on chronic symptomatic anemia with secondary generalized weakness:   2.  Type 2 diabetes   3.  Hypertension    4.  Coronary artery disease    5.  History of HIV   6.  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, recurrent     Bradycardia 5/28/2016     CAD S/P percutaneous coronary angioplasty 6/15/2015     Cancer (H)      Chest pain      CKD (chronic kidney disease)     Hemodialysis     Dilated aortic root (H) 5/6/2016     ESRD (end stage renal disease) on dialysis (H) 5/6/2016     Hemodialysis-associated hypotension 5/22/2016     Human immunodeficiency virus (HIV) disease      Hypertension 2010     Hypotension, unspecified hypotension type 5/22/2016     Impotence of organic origin      Increased prostate specific antigen (PSA) velocity 08/08/2016    Awaiting bx on blood thinner     Mixed hyperlipidemia      Near syncope 2016    with hemodialysis     NSTEMI (non-ST elevated myocardial infarction) (H) 12/2015, 5/2016     Pulmonary HTN     Mod     TIA (transient ischemic attack) 5/2016     Type 2 diabetes mellitus (H) age 50     Unstable angina (H) 3/4/2016       History of Present Illness   Donald Raza is an 68 year old male who presented with weakness.  Please see the admission history and physical for full details.    Hospital Course   Pt is a  68-year-old gentleman with a history of coronary artery disease, hypertension, hyperlipidemia, HIV, type 2 diabetes, and end-stage renal dialysis on scheduled Tuesdays, Thursdays, Saturdays, who presents here with  generalized weakness.       1.  Acute on chronic symptomatic anemia with secondary generalized weakness: Transfuse one unit of PRCB since he is refractory to current Fe supp and EPO, becoming more transfusion dependent. He had no issues during and after transfusion.       2.  Type 2 diabetes:  Dialysis diet and a medium intensity sliding scale , PTA Lantus.   3.  Hypertension: Avapro, Imdur and carvedilol.   4.  Coronary artery disease: Aspirin,  Avapro, Imdur and carvedilol.   5.  History of HIV:  Continue ART as PTA using patient's own supply.   6.  ESRD: on HD on T/T/Sat; Nephrology inpt appct.  dialysis completed.      # Discharge Pain Plan:    - Patient currently has NO PAIN and is not being prescribed pain medications on discharge.    Significant Results and Procedures   See below    Pending Results   Unresulted Labs Ordered in the Past 30 Days of this Admission     No orders found from 5/13/2018 to 7/13/2018.        Code Status   Full Code       Primary Care Physician   Aida Thomas    GEN:  Alert, oriented x 3, appears comfortable, NAD.  HEENT:  Normocephalic/atraumatic, no scleral icterus, no nasal discharge, mouth moist.  CV:  Regular rate and rhythm, no murmur or JVD.  S1 + S2 noted, no S3 or S4.  LUNGS:  Clear to auscultation bilaterally without rales/rhonchi/wheezing/retractions.  Symmetric chest rise on inhalation noted.  ABD:  Active bowel sounds, soft, non-tender/non-distended.  No rebound/guarding/rigidity.  EXT:  No edema or cyanosis.  No joint synovitis noted.  SKIN:  Dry to touch, no exanthems noted in the visualized areas.     Discharge Disposition   Discharged to home    Consultations This Hospital Stay   [facility] IP CONSULT    Time Spent on this Encounter   IEamon, personally saw the patient today and spent greater than 30 minutes discharging this patient.    Discharge Orders     Reason for your hospital stay   Acute on chronic anemia.     Follow-up and  recommended labs and tests    Follow up with primary care provider, Aida Thomas, within 7 days for hospital follow- up.  No follow up labs or test are needed.     Activity   Your activity upon discharge: activity as tolerated     Full Code     Diet   Follow this diet upon discharge: dialysis diet       Discharge Medications   Current Discharge Medication List      CONTINUE these medications which have NOT CHANGED    Details   abacavir (ZIAGEN) 300 MG tablet Take 600 mg by mouth every evening       Acetaminophen (TYLENOL PO) Take by mouth as needed for mild pain or fever      albuterol (PROAIR HFA/PROVENTIL HFA/VENTOLIN HFA) 108 (90 BASE) MCG/ACT Inhaler Inhale 2 puffs into the lungs every 6 hours as needed for shortness of breath / dyspnea or wheezing  Qty: 1 Inhaler, Refills: 1    Associated Diagnoses: Cough      aspirin EC 81 MG EC tablet Take 1 tablet (81 mg) by mouth daily  Qty: 30 tablet, Refills: 0    Associated Diagnoses: Coronary artery disease, angina presence unspecified, unspecified vessel or lesion type, unspecified whether native or transplanted heart      atorvastatin (LIPITOR) 40 MG tablet Take 40 mg by mouth At Bedtime      B COMPLEX-C-FOLIC ACID PO Take 1 tablet by mouth At Bedtime       carvedilol (COREG) 12.5 MG tablet Take 1 tablet (12.5 mg) by mouth 2 times daily (with meals)  Qty: 60 tablet, Refills: 0    Associated Diagnoses: Hypertension secondary to other renal disorders      chlorhexidine (CHLORHEXIDINE) 0.12 % solution Rinse and gargle 15 ml by mouth twice a day as directed.      CLONAZEPAM PO Take 0.5 mg by mouth nightly as needed for anxiety (restless legs)      CLOPIDOGREL BISULFATE PO Take 75 mg by mouth every evening       dapsone 100 MG tablet Take 100 mg by mouth At Bedtime       Darunavir Ethanolate (PREZISTA PO) Take 800 mg by mouth At Bedtime.      dolutegravir (TIVICAY) 50 MG tablet Take 50 mg by mouth At Bedtime      DOXERCALCIFEROL IV Inject 6 mcg into the vein three  "times a week (with dialysis)      epoetin brittni (EPOGEN,PROCRIT) 18372 UNIT/ML injection Inject 11,000 Units Subcutaneous three times a week WITH DIALYSIS      guaiFENesin (MUCINEX) 600 MG 12 hr tablet Take by mouth as needed for congestion      hydrALAZINE (APRESOLINE) 25 MG tablet Take 1 tablet (25 mg) by mouth 3 times daily  Qty: 90 tablet, Refills: 0    Associated Diagnoses: Hypertension secondary to other renal disorders      imiquimod (ALDARA) 5 % cream Apply topically as needed      insulin glargine (LANTUS) 100 UNIT/ML injection Inject 25 Units Subcutaneous 2 times daily       Insulin Lispro (HUMALOG PEN SC) Take 15 units TID and an additional 2 units if BG is over 150      ipratropium - albuterol 0.5 mg/2.5 mg/3 mL (DUONEB) 0.5-2.5 (3) MG/3ML neb solution Take 1 vial (3 mLs) by nebulization every 6 hours as needed for shortness of breath / dyspnea or wheezing  Qty: 90 vial, Refills: 1    Associated Diagnoses: Acute respiratory failure with hypoxia (H)      irbesartan (AVAPRO) 300 MG tablet Take 1 tablet (300 mg) by mouth At Bedtime  Qty: 30 tablet, Refills: 0    Associated Diagnoses: Hypertension secondary to other renal disorders      iron sucrose (VENOFER) 20 MG/ML injection Inject 50 mg into the vein once a week WIth dialysis      Isosorbide Mononitrate  MG TB24 Take 1 tablet (120 mg) by mouth every evening  Qty: 30 tablet, Refills: 11    Associated Diagnoses: Coronary artery disease involving native heart, angina presence unspecified, unspecified vessel or lesion type; Hypertension secondary to other renal disorders      MAGNESIUM OXIDE PO Take 400 mg by mouth At Bedtime      nitroglycerin (NITROSTAT) 0.4 MG SL tablet One tablet under the tongue every 5 minutes if needed for chest pain. May repeat every 5 minutes for a maximum of 3 doses in 15 minutes\"  Qty: 25 tablet, Refills: 3    Associated Diagnoses: Coronary artery disease due to lipid rich plaque; Status post coronary angioplasty    "   Nutritional Supplements (NEPRO PO) 1-3 times daily      omega-3 acid ethyl esters (LOVAZA) 1 G capsule Take 2 g by mouth 2 times daily      omeprazole (PRILOSEC) 40 MG capsule Take 40 mg by mouth daily 1 hour before dinner      !! pregabalin (LYRICA) 100 MG capsule Take 100 mg by mouth At Bedtime      !! pregabalin (LYRICA) 25 MG capsule Take 25 mg by mouth On dialysis days in AM      ritonavir (NORVIR) 100 MG capsule Take 1 capsule by mouth At Bedtime       sucroferric oxyhydroxide (VELPHORO) 500 MG CHEW chewable tablet Take 500 mg by mouth 3 times daily (with meals)      terbinafine (LAMISIL AT) 1 % cream Apply topically 2 times daily as needed       mycophenolate (GENERIC EQUIVALENT) 500 MG tablet Take 1 tablet (500 mg) by mouth 2 times daily On an empty stomach    Comments: HOLD until seen by pcp/ renal within a week  Associated Diagnoses: ESRD (end stage renal disease) on dialysis (H)      order for DME Equipment being ordered: Other: 4WW  Treatment Diagnosis: Decreased activity tolerance  Qty: 1 each, Refills: 0    Associated Diagnoses: SOB (shortness of breath); Generalized muscle weakness       !! - Potential duplicate medications found. Please discuss with provider.        Allergies   Allergies   Allergen Reactions     Calcium Acetate Other (See Comments)     Other reaction(s): Other, see comments  Pain in chest and back  Pain in chest area (sensitive skin)      Diagnostic X-Ray Materials Other (See Comments)     PN: renal failure     Lisinopril      Sulfa Drugs      Data   Most Recent 3 CBC's:  Recent Labs   Lab Test  07/13/18   0656  07/12/18   1530  03/15/18   0707   03/13/18   0609   WBC  4.1  4.7   --    --   6.3   HGB  8.3*  9.2*  10.1*   < >  9.7*   MCV  98  98   --    --   95   PLT  103*  126*   --    --   160    < > = values in this interval not displayed.      Most Recent 3 BMP's:  Recent Labs   Lab Test  07/13/18   0656  07/12/18   1530  03/14/18   0829   NA  138  136  135   POTASSIUM  3.9  4.1   4.0   CHLORIDE  103  98  98   CO2  26  30  28   BUN  39*  25  39*   CR  6.76*  5.30*  4.89*   ANIONGAP  9  8  9   PRABHA  9.0  9.2  9.3   GLC  114*  223*  114*     Most Recent 2 LFT's:  Recent Labs   Lab Test  07/12/18   1530  03/10/18   0730   AST  12  17   ALT  22  17   ALKPHOS  127  89   BILITOTAL  0.6  0.4     Most Recent INR's and Anticoagulation Dosing History:  Anticoagulation Dose History     Recent Dosing and Labs Latest Ref Rng & Units 8/15/2016 1/8/2017 4/15/2017 9/19/2017 9/20/2017 10/10/2017 11/21/2017    INR 0.86 - 1.14 0.99 1.41(H) 1.09 0.94 1.01 0.95 0.97        Most Recent 3 Troponin's:  Recent Labs   Lab Test  07/12/18   1530  03/09/18   1016  03/09/18   0709   03/08/18   0542   12/25/17   0856   04/15/17   0820   TROPI  <0.015  0.065*  0.074*   < >  0.043   < >   --    < >   --    TROPONIN   --    --    --    --   0.02   --   0.02   --   0.02    < > = values in this interval not displayed.     Most Recent Cholesterol Panel:  Recent Labs   Lab Test  08/09/17   0818   CHOL  125   LDL  30   HDL  33*   TRIG  311*     Most Recent 6 Bacteria Isolates From Any Culture (See EPIC Reports for Culture Details):  Recent Labs   Lab Test  02/01/18   0440  02/01/18   0405  11/25/17   0350  11/22/17 2000 11/22/17   1958  11/21/17   1921   CULT  No growth  No growth  No growth  No growth  No growth  No growth     Most Recent TSH, T4 and A1c Labs:  Recent Labs   Lab Test  03/08/18   0542   10/10/17   2225   TSH   --    --   2.19   A1C  Canceled, Test credited   < >   --     < > = values in this interval not displayed.     Results for orders placed or performed during the hospital encounter of 07/12/18   XR Chest 2 Views    Narrative    CHEST TWO VIEWS   7/12/2018 4:44 PM    HISTORY: Cough, weakness.     COMPARISON: 3/9/2018      Impression    IMPRESSION: There has been improvement of what is now only mild  diffuse interstitial prominence, likely representing vascular  congestion or pulmonary edema. No other  abnormality or change is seen.  The heart size is normal and no pleural effusion is seen.     CHIKA SUTHERLAND MD   CT Head w/o Contrast    Narrative    CT OF THE HEAD WITHOUT CONTRAST 7/12/2018 4:36 PM     COMPARISON: Head CT 4/15/2017    HISTORY: Weakness.     TECHNIQUE: 5 mm thick axial CT images of the head were acquired  without IV contrast material.    FINDINGS: There is mild diffuse cerebral volume loss. There are subtle  patchy areas of decreased density in the cerebral white matter  bilaterally that are consistent with sequela of chronic small vessel  ischemic disease.    The ventricles and basal cisterns are within normal limits in  configuration given the degree of cerebral volume loss. There is no  midline shift. There are no extra-axial fluid collections.    No intracranial hemorrhage, mass or recent infarct.    The visualized paranasal sinuses are well-aerated. There is no  mastoiditis. There are no fractures of the visualized bones.      Impression    IMPRESSION: Diffuse cerebral volume loss and cerebral white matter  changes consistent with chronic small vessel ischemic disease. No  evidence for acute intracranial pathology.        Radiation dose for this scan was reduced using automated exposure  control, adjustment of the mA and/or kV according to patient size, or  iterative reconstruction technique.    MIKEY GOLDSTEIN MD     Disclaimer: This note consists of symbols derived from keyboarding, dictation and/or voice recognition software. As a result, there may be errors in the script that have gone undetected. Please consider this when interpreting information found in this chart.

## 2018-09-01 ENCOUNTER — APPOINTMENT (OUTPATIENT)
Dept: CT IMAGING | Facility: CLINIC | Age: 68
DRG: 312 | End: 2018-09-01
Attending: EMERGENCY MEDICINE
Payer: MEDICARE

## 2018-09-01 ENCOUNTER — TRANSFERRED RECORDS (OUTPATIENT)
Dept: HEALTH INFORMATION MANAGEMENT | Facility: CLINIC | Age: 68
End: 2018-09-01

## 2018-09-01 ENCOUNTER — HOSPITAL ENCOUNTER (INPATIENT)
Facility: CLINIC | Age: 68
LOS: 7 days | Discharge: HOME HEALTH | DRG: 312 | End: 2018-09-08
Attending: EMERGENCY MEDICINE | Admitting: INTERNAL MEDICINE
Payer: MEDICARE

## 2018-09-01 ENCOUNTER — APPOINTMENT (OUTPATIENT)
Dept: MRI IMAGING | Facility: CLINIC | Age: 68
DRG: 312 | End: 2018-09-01
Attending: EMERGENCY MEDICINE
Payer: MEDICARE

## 2018-09-01 DIAGNOSIS — R10.84 ABDOMINAL PAIN, GENERALIZED: ICD-10-CM

## 2018-09-01 DIAGNOSIS — R47.01 EXPRESSIVE APHASIA: ICD-10-CM

## 2018-09-01 DIAGNOSIS — R53.1 GENERALIZED WEAKNESS: ICD-10-CM

## 2018-09-01 DIAGNOSIS — R29.6 FALLING: ICD-10-CM

## 2018-09-01 DIAGNOSIS — G45.9 TRANSIENT CEREBRAL ISCHEMIA, UNSPECIFIED TYPE: Primary | ICD-10-CM

## 2018-09-01 LAB
ALBUMIN SERPL-MCNC: 4.1 G/DL (ref 3.4–5)
ALP SERPL-CCNC: 146 U/L (ref 40–150)
ALT SERPL W P-5'-P-CCNC: 19 U/L (ref 0–70)
ANION GAP SERPL CALCULATED.3IONS-SCNC: 11 MMOL/L (ref 3–14)
AST SERPL W P-5'-P-CCNC: 19 U/L (ref 0–45)
BASOPHILS # BLD AUTO: 0 10E9/L (ref 0–0.2)
BASOPHILS NFR BLD AUTO: 0.5 %
BILIRUB SERPL-MCNC: 0.5 MG/DL (ref 0.2–1.3)
BUN SERPL-MCNC: 34 MG/DL (ref 7–30)
CALCIUM SERPL-MCNC: 9.3 MG/DL (ref 8.5–10.1)
CHLORIDE SERPL-SCNC: 96 MMOL/L (ref 94–109)
CO2 BLDCOV-SCNC: 26 MMOL/L (ref 21–28)
CO2 SERPL-SCNC: 26 MMOL/L (ref 20–32)
CREAT SERPL-MCNC: 5.38 MG/DL (ref 0.66–1.25)
DIFFERENTIAL METHOD BLD: ABNORMAL
EOSINOPHIL # BLD AUTO: 0 10E9/L (ref 0–0.7)
EOSINOPHIL NFR BLD AUTO: 0.7 %
ERYTHROCYTE [DISTWIDTH] IN BLOOD BY AUTOMATED COUNT: 15.3 % (ref 10–15)
GFR SERPL CREATININE-BSD FRML MDRD: 11 ML/MIN/1.7M2
GLUCOSE BLDC GLUCOMTR-MCNC: 114 MG/DL (ref 70–99)
GLUCOSE BLDC GLUCOMTR-MCNC: 132 MG/DL (ref 70–99)
GLUCOSE SERPL-MCNC: 171 MG/DL (ref 70–99)
HCT VFR BLD AUTO: 30.4 % (ref 40–53)
HGB BLD-MCNC: 9.4 G/DL (ref 13.3–17.7)
IMM GRANULOCYTES # BLD: 0.1 10E9/L (ref 0–0.4)
IMM GRANULOCYTES NFR BLD: 0.8 %
LACTATE BLD-SCNC: 2.8 MMOL/L (ref 0.7–2)
LACTATE BLD-SCNC: 2.8 MMOL/L (ref 0.7–2.1)
LIPASE SERPL-CCNC: 205 U/L (ref 73–393)
LYMPHOCYTES # BLD AUTO: 0.7 10E9/L (ref 0.8–5.3)
LYMPHOCYTES NFR BLD AUTO: 11.8 %
MAGNESIUM SERPL-MCNC: 2.1 MG/DL (ref 1.6–2.3)
MCH RBC QN AUTO: 29.9 PG (ref 26.5–33)
MCHC RBC AUTO-ENTMCNC: 30.9 G/DL (ref 31.5–36.5)
MCV RBC AUTO: 97 FL (ref 78–100)
MONOCYTES # BLD AUTO: 0.5 10E9/L (ref 0–1.3)
MONOCYTES NFR BLD AUTO: 8 %
NEUTROPHILS # BLD AUTO: 4.8 10E9/L (ref 1.6–8.3)
NEUTROPHILS NFR BLD AUTO: 78.2 %
NRBC # BLD AUTO: 0 10*3/UL
NRBC BLD AUTO-RTO: 0 /100
PCO2 BLDV: 37 MM HG (ref 40–50)
PH BLDV: 7.45 PH (ref 7.32–7.43)
PHOSPHATE SERPL-MCNC: 3.3 MG/DL (ref 2.5–4.5)
PLATELET # BLD AUTO: 123 10E9/L (ref 150–450)
PLATELET REACTIVITY P2Y12: 293 PRU
PO2 BLDV: 24 MM HG (ref 25–47)
POTASSIUM SERPL-SCNC: 4.2 MMOL/L (ref 3.4–5.3)
PROT SERPL-MCNC: 8 G/DL (ref 6.8–8.8)
RADIOLOGIST FLAGS: ABNORMAL
RBC # BLD AUTO: 3.14 10E12/L (ref 4.4–5.9)
SAO2 % BLDV FROM PO2: 45 %
SODIUM SERPL-SCNC: 133 MMOL/L (ref 133–144)
TROPONIN I SERPL-MCNC: <0.015 UG/L (ref 0–0.04)
WBC # BLD AUTO: 6.1 10E9/L (ref 4–11)

## 2018-09-01 PROCEDURE — 81001 URINALYSIS AUTO W/SCOPE: CPT | Performed by: EMERGENCY MEDICINE

## 2018-09-01 PROCEDURE — 83605 ASSAY OF LACTIC ACID: CPT

## 2018-09-01 PROCEDURE — A9270 NON-COVERED ITEM OR SERVICE: HCPCS | Mod: GY | Performed by: INTERNAL MEDICINE

## 2018-09-01 PROCEDURE — 70544 MR ANGIOGRAPHY HEAD W/O DYE: CPT

## 2018-09-01 PROCEDURE — 00000146 ZZHCL STATISTIC GLUCOSE BY METER IP

## 2018-09-01 PROCEDURE — 96376 TX/PRO/DX INJ SAME DRUG ADON: CPT

## 2018-09-01 PROCEDURE — 83690 ASSAY OF LIPASE: CPT | Performed by: EMERGENCY MEDICINE

## 2018-09-01 PROCEDURE — 80053 COMPREHEN METABOLIC PANEL: CPT | Performed by: EMERGENCY MEDICINE

## 2018-09-01 PROCEDURE — 25000128 H RX IP 250 OP 636: Performed by: EMERGENCY MEDICINE

## 2018-09-01 PROCEDURE — 25000132 ZZH RX MED GY IP 250 OP 250 PS 637: Mod: GY | Performed by: INTERNAL MEDICINE

## 2018-09-01 PROCEDURE — 25000132 ZZH RX MED GY IP 250 OP 250 PS 637: Mod: GY | Performed by: EMERGENCY MEDICINE

## 2018-09-01 PROCEDURE — 99285 EMERGENCY DEPT VISIT HI MDM: CPT | Mod: 25

## 2018-09-01 PROCEDURE — 85025 COMPLETE CBC W/AUTO DIFF WBC: CPT | Performed by: EMERGENCY MEDICINE

## 2018-09-01 PROCEDURE — 70496 CT ANGIOGRAPHY HEAD: CPT

## 2018-09-01 PROCEDURE — 70496 CT ANGIOGRAPHY HEAD: CPT | Mod: XS

## 2018-09-01 PROCEDURE — A9270 NON-COVERED ITEM OR SERVICE: HCPCS | Mod: GY | Performed by: EMERGENCY MEDICINE

## 2018-09-01 PROCEDURE — 84484 ASSAY OF TROPONIN QUANT: CPT | Performed by: EMERGENCY MEDICINE

## 2018-09-01 PROCEDURE — 85576 BLOOD PLATELET AGGREGATION: CPT | Performed by: EMERGENCY MEDICINE

## 2018-09-01 PROCEDURE — 12000007 ZZH R&B INTERMEDIATE

## 2018-09-01 PROCEDURE — 70450 CT HEAD/BRAIN W/O DYE: CPT

## 2018-09-01 PROCEDURE — 93005 ELECTROCARDIOGRAM TRACING: CPT

## 2018-09-01 PROCEDURE — 84100 ASSAY OF PHOSPHORUS: CPT | Performed by: EMERGENCY MEDICINE

## 2018-09-01 PROCEDURE — 96375 TX/PRO/DX INJ NEW DRUG ADDON: CPT

## 2018-09-01 PROCEDURE — 82803 BLOOD GASES ANY COMBINATION: CPT

## 2018-09-01 PROCEDURE — 70551 MRI BRAIN STEM W/O DYE: CPT

## 2018-09-01 PROCEDURE — 96374 THER/PROPH/DIAG INJ IV PUSH: CPT | Mod: 59

## 2018-09-01 PROCEDURE — 36415 COLL VENOUS BLD VENIPUNCTURE: CPT | Performed by: EMERGENCY MEDICINE

## 2018-09-01 PROCEDURE — 83735 ASSAY OF MAGNESIUM: CPT | Performed by: EMERGENCY MEDICINE

## 2018-09-01 PROCEDURE — 25000131 ZZH RX MED GY IP 250 OP 636 PS 637: Mod: GY | Performed by: INTERNAL MEDICINE

## 2018-09-01 PROCEDURE — 99223 1ST HOSP IP/OBS HIGH 75: CPT | Mod: AI | Performed by: INTERNAL MEDICINE

## 2018-09-01 PROCEDURE — 74176 CT ABD & PELVIS W/O CONTRAST: CPT

## 2018-09-01 PROCEDURE — 83605 ASSAY OF LACTIC ACID: CPT | Performed by: EMERGENCY MEDICINE

## 2018-09-01 RX ORDER — HYDROMORPHONE HYDROCHLORIDE 2 MG/1
2 TABLET ORAL
Status: DISCONTINUED | OUTPATIENT
Start: 2018-09-01 | End: 2018-09-08 | Stop reason: HOSPADM

## 2018-09-01 RX ORDER — DAPSONE 100 MG/1
100 TABLET ORAL AT BEDTIME
Status: DISCONTINUED | OUTPATIENT
Start: 2018-09-01 | End: 2018-09-08 | Stop reason: HOSPADM

## 2018-09-01 RX ORDER — MIRTAZAPINE 15 MG/1
15 TABLET, FILM COATED ORAL AT BEDTIME
Status: DISCONTINUED | OUTPATIENT
Start: 2018-09-01 | End: 2018-09-08 | Stop reason: HOSPADM

## 2018-09-01 RX ORDER — PREGABALIN 25 MG/1
25 CAPSULE ORAL DAILY PRN
Status: DISCONTINUED | OUTPATIENT
Start: 2018-09-01 | End: 2018-09-01

## 2018-09-01 RX ORDER — IOPAMIDOL 755 MG/ML
500 INJECTION, SOLUTION INTRAVASCULAR ONCE
Status: COMPLETED | OUTPATIENT
Start: 2018-09-01 | End: 2018-09-01

## 2018-09-01 RX ORDER — NICOTINE POLACRILEX 4 MG
15-30 LOZENGE BUCCAL
Status: DISCONTINUED | OUTPATIENT
Start: 2018-09-01 | End: 2018-09-08 | Stop reason: HOSPADM

## 2018-09-01 RX ORDER — ONDANSETRON 2 MG/ML
4 INJECTION INTRAMUSCULAR; INTRAVENOUS EVERY 6 HOURS PRN
Status: DISCONTINUED | OUTPATIENT
Start: 2018-09-01 | End: 2018-09-08 | Stop reason: HOSPADM

## 2018-09-01 RX ORDER — ASPIRIN 325 MG
325 TABLET ORAL ONCE
Status: COMPLETED | OUTPATIENT
Start: 2018-09-01 | End: 2018-09-01

## 2018-09-01 RX ORDER — IPRATROPIUM BROMIDE AND ALBUTEROL SULFATE 2.5; .5 MG/3ML; MG/3ML
1 SOLUTION RESPIRATORY (INHALATION) EVERY 6 HOURS PRN
Status: DISCONTINUED | OUTPATIENT
Start: 2018-09-01 | End: 2018-09-08 | Stop reason: HOSPADM

## 2018-09-01 RX ORDER — NITROGLYCERIN 0.4 MG/1
0.4 TABLET SUBLINGUAL EVERY 5 MIN PRN
Status: DISCONTINUED | OUTPATIENT
Start: 2018-09-01 | End: 2018-09-03

## 2018-09-01 RX ORDER — GABAPENTIN 300 MG/1
300 CAPSULE ORAL 2 TIMES DAILY
Status: DISCONTINUED | OUTPATIENT
Start: 2018-09-01 | End: 2018-09-08 | Stop reason: HOSPADM

## 2018-09-01 RX ORDER — MIRTAZAPINE 15 MG/1
15 TABLET, FILM COATED ORAL AT BEDTIME
COMMUNITY

## 2018-09-01 RX ORDER — DEXTROSE MONOHYDRATE 25 G/50ML
25-50 INJECTION, SOLUTION INTRAVENOUS
Status: DISCONTINUED | OUTPATIENT
Start: 2018-09-01 | End: 2018-09-08 | Stop reason: HOSPADM

## 2018-09-01 RX ORDER — CARVEDILOL 12.5 MG/1
12.5 TABLET ORAL 2 TIMES DAILY WITH MEALS
Status: DISCONTINUED | OUTPATIENT
Start: 2018-09-01 | End: 2018-09-08 | Stop reason: HOSPADM

## 2018-09-01 RX ORDER — NALOXONE HYDROCHLORIDE 0.4 MG/ML
.1-.4 INJECTION, SOLUTION INTRAMUSCULAR; INTRAVENOUS; SUBCUTANEOUS
Status: DISCONTINUED | OUTPATIENT
Start: 2018-09-01 | End: 2018-09-01

## 2018-09-01 RX ORDER — ACETAMINOPHEN 325 MG/1
650 TABLET ORAL EVERY 4 HOURS PRN
Status: DISCONTINUED | OUTPATIENT
Start: 2018-09-01 | End: 2018-09-08 | Stop reason: HOSPADM

## 2018-09-01 RX ORDER — KETOCONAZOLE 20 MG/G
CREAM TOPICAL DAILY
COMMUNITY

## 2018-09-01 RX ORDER — CLOPIDOGREL BISULFATE 75 MG/1
75 TABLET ORAL EVERY EVENING
Status: DISCONTINUED | OUTPATIENT
Start: 2018-09-01 | End: 2018-09-08 | Stop reason: HOSPADM

## 2018-09-01 RX ORDER — ONDANSETRON 4 MG/1
4 TABLET, ORALLY DISINTEGRATING ORAL EVERY 6 HOURS PRN
Status: DISCONTINUED | OUTPATIENT
Start: 2018-09-01 | End: 2018-09-08 | Stop reason: HOSPADM

## 2018-09-01 RX ORDER — RITONAVIR 100 MG/1
100 TABLET ORAL AT BEDTIME
Status: DISCONTINUED | OUTPATIENT
Start: 2018-09-01 | End: 2018-09-08 | Stop reason: HOSPADM

## 2018-09-01 RX ORDER — ATORVASTATIN CALCIUM 40 MG/1
40 TABLET, FILM COATED ORAL AT BEDTIME
Status: DISCONTINUED | OUTPATIENT
Start: 2018-09-01 | End: 2018-09-08 | Stop reason: HOSPADM

## 2018-09-01 RX ORDER — IRBESARTAN 150 MG/1
300 TABLET ORAL AT BEDTIME
Status: DISCONTINUED | OUTPATIENT
Start: 2018-09-01 | End: 2018-09-08 | Stop reason: HOSPADM

## 2018-09-01 RX ORDER — BISACODYL 10 MG
10 SUPPOSITORY, RECTAL RECTAL ONCE
Status: COMPLETED | OUTPATIENT
Start: 2018-09-01 | End: 2018-09-01

## 2018-09-01 RX ORDER — NALOXONE HYDROCHLORIDE 0.4 MG/ML
.1-.4 INJECTION, SOLUTION INTRAMUSCULAR; INTRAVENOUS; SUBCUTANEOUS
Status: DISCONTINUED | OUTPATIENT
Start: 2018-09-01 | End: 2018-09-08 | Stop reason: HOSPADM

## 2018-09-01 RX ORDER — ASPIRIN 325 MG
325 TABLET ORAL DAILY
Status: DISCONTINUED | OUTPATIENT
Start: 2018-09-02 | End: 2018-09-08 | Stop reason: HOSPADM

## 2018-09-01 RX ORDER — POLYETHYLENE GLYCOL 3350 17 G/17G
17 POWDER, FOR SOLUTION ORAL ONCE
Status: COMPLETED | OUTPATIENT
Start: 2018-09-01 | End: 2018-09-01

## 2018-09-01 RX ORDER — LIDOCAINE 40 MG/G
CREAM TOPICAL
Status: DISCONTINUED | OUTPATIENT
Start: 2018-09-01 | End: 2018-09-08 | Stop reason: HOSPADM

## 2018-09-01 RX ORDER — ONDANSETRON 2 MG/ML
4 INJECTION INTRAMUSCULAR; INTRAVENOUS EVERY 30 MIN PRN
Status: DISCONTINUED | OUTPATIENT
Start: 2018-09-01 | End: 2018-09-01

## 2018-09-01 RX ORDER — ISOSORBIDE MONONITRATE 60 MG/1
120 TABLET, EXTENDED RELEASE ORAL EVERY EVENING
Status: DISCONTINUED | OUTPATIENT
Start: 2018-09-01 | End: 2018-09-08 | Stop reason: HOSPADM

## 2018-09-01 RX ORDER — HYDROMORPHONE HYDROCHLORIDE 1 MG/ML
0.5 INJECTION, SOLUTION INTRAMUSCULAR; INTRAVENOUS; SUBCUTANEOUS
Status: DISCONTINUED | OUTPATIENT
Start: 2018-09-01 | End: 2018-09-01

## 2018-09-01 RX ORDER — HYDRALAZINE HYDROCHLORIDE 25 MG/1
25 TABLET, FILM COATED ORAL 3 TIMES DAILY
Status: DISCONTINUED | OUTPATIENT
Start: 2018-09-01 | End: 2018-09-04

## 2018-09-01 RX ORDER — CLONAZEPAM 0.25 MG/1
0.5 TABLET, ORALLY DISINTEGRATING ORAL
Status: DISCONTINUED | OUTPATIENT
Start: 2018-09-01 | End: 2018-09-08 | Stop reason: HOSPADM

## 2018-09-01 RX ORDER — ABACAVIR 300 MG/1
600 TABLET ORAL EVERY EVENING
Status: DISCONTINUED | OUTPATIENT
Start: 2018-09-01 | End: 2018-09-08 | Stop reason: HOSPADM

## 2018-09-01 RX ORDER — GABAPENTIN 300 MG/1
300 CAPSULE ORAL 2 TIMES DAILY
Status: ON HOLD | COMMUNITY
End: 2019-01-29

## 2018-09-01 RX ORDER — ALBUTEROL SULFATE 90 UG/1
2 AEROSOL, METERED RESPIRATORY (INHALATION) EVERY 6 HOURS PRN
Status: DISCONTINUED | OUTPATIENT
Start: 2018-09-01 | End: 2018-09-08 | Stop reason: HOSPADM

## 2018-09-01 RX ADMIN — Medication 0.5 MG: at 12:33

## 2018-09-01 RX ADMIN — IRBESARTAN 300 MG: 150 TABLET ORAL at 22:17

## 2018-09-01 RX ADMIN — ONDANSETRON 4 MG: 2 INJECTION INTRAMUSCULAR; INTRAVENOUS at 12:34

## 2018-09-01 RX ADMIN — MIRTAZAPINE 15 MG: 15 TABLET, FILM COATED ORAL at 22:17

## 2018-09-01 RX ADMIN — RITONAVIR 100 MG: 100 TABLET, FILM COATED ORAL at 22:16

## 2018-09-01 RX ADMIN — ABACAVIR 600 MG: 300 TABLET, FILM COATED ORAL at 19:59

## 2018-09-01 RX ADMIN — HYDRALAZINE HYDROCHLORIDE 25 MG: 25 TABLET ORAL at 22:17

## 2018-09-01 RX ADMIN — DARUNAVIR 800 MG: 800 TABLET, FILM COATED ORAL at 22:16

## 2018-09-01 RX ADMIN — SODIUM CHLORIDE 80 ML: 900 INJECTION, SOLUTION INTRAVENOUS at 12:10

## 2018-09-01 RX ADMIN — GABAPENTIN 300 MG: 300 CAPSULE ORAL at 22:17

## 2018-09-01 RX ADMIN — POLYETHYLENE GLYCOL 3350 17 G: 17 POWDER, FOR SOLUTION ORAL at 19:56

## 2018-09-01 RX ADMIN — HYDROMORPHONE HYDROCHLORIDE 2 MG: 2 TABLET ORAL at 17:41

## 2018-09-01 RX ADMIN — Medication 0.5 MG: at 13:30

## 2018-09-01 RX ADMIN — ISOSORBIDE MONONITRATE 120 MG: 60 TABLET, EXTENDED RELEASE ORAL at 19:57

## 2018-09-01 RX ADMIN — IOPAMIDOL 120 ML: 755 INJECTION, SOLUTION INTRAVENOUS at 12:03

## 2018-09-01 RX ADMIN — DOLUTEGRAVIR SODIUM 50 MG: 50 TABLET, FILM COATED ORAL at 22:16

## 2018-09-01 RX ADMIN — CLOPIDOGREL 75 MG: 75 TABLET, FILM COATED ORAL at 19:57

## 2018-09-01 RX ADMIN — CARVEDILOL 12.5 MG: 12.5 TABLET, FILM COATED ORAL at 19:57

## 2018-09-01 RX ADMIN — BISACODYL 10 MG: 10 SUPPOSITORY RECTAL at 20:04

## 2018-09-01 RX ADMIN — ATORVASTATIN CALCIUM 40 MG: 40 TABLET, FILM COATED ORAL at 22:17

## 2018-09-01 RX ADMIN — DAPSONE 100 MG: 100 TABLET ORAL at 22:16

## 2018-09-01 RX ADMIN — INSULIN GLARGINE 20 UNITS: 100 INJECTION, SOLUTION SUBCUTANEOUS at 22:17

## 2018-09-01 RX ADMIN — ASPIRIN 325 MG ORAL TABLET 325 MG: 325 PILL ORAL at 15:52

## 2018-09-01 RX ADMIN — OMEPRAZOLE 40 MG: 20 CAPSULE, DELAYED RELEASE ORAL at 19:59

## 2018-09-01 ASSESSMENT — ENCOUNTER SYMPTOMS
VOMITING: 0
SPEECH DIFFICULTY: 1
CONFUSION: 1
HEADACHES: 1
ABDOMINAL PAIN: 1
SHORTNESS OF BREATH: 1

## 2018-09-01 ASSESSMENT — ACTIVITIES OF DAILY LIVING (ADL): ADLS_ACUITY_SCORE: 10

## 2018-09-01 NOTE — ED PROVIDER NOTES
History     Chief Complaint:  Abdominal Pain    HPI   Donald Raza is a 68 year old male with a history of CKD and CAD who is on Plavix and has dialysis Tuesday, Thursday and Saturday. He presents to the emergency department today for evaluation of abdominal pain and nausea. EMS reports the patient was FDC through his dialysis treatment today when he complained of abdominal pain, was diaphoretic a nauseous. His dialysis center referred him to urgent care. His brother drove him and when he arrived, the urgent care contacted EMS who brought the patient to the emergency department. With EMS, he was pale and weak. He was given Zofran en route and did not vomit. He has not had a bowel movement in the past three days and his BP was 208 systolic. Here, the patient rates his pain 4/10 and reports some shortness of breath. He reports having a headache he rates 8/10 which began since EMS picked him up but has been complaining of headaches over the past month. For the past two weeks, he has been dealing with constipation. He denies chest pain. The brother reports the patient has been off balance when walking, is having some speech problems over the past week and almost fell yesterday.     Allergies:  Calcium Acetate  Diagnostic X-Ray Materials  Lisinopril  Sulfa Drugs    Medications:    abacavir (ZIAGEN) 300 MG tablet  aspirin EC 81 MG EC tablet  atorvastatin (LIPITOR) 40 MG tablet  B COMPLEX-C-FOLIC ACID PO  carvedilol (COREG) 12.5 MG tablet  CLONAZEPAM PO  CLOPIDOGREL BISULFATE PO  dapsone 100 MG tablet  Darunavir Ethanolate (PREZISTA PO)  dolutegravir (TIVICAY) 50 MG tablet  guaiFENesin (MUCINEX) 600 MG 12 hr tablet  hydrALAZINE (APRESOLINE) 25 MG tablet  irbesartan (AVAPRO) 300 MG tablet  Isosorbide Mononitrate  MG TB24  MAGNESIUM OXIDE PO  mycophenolate (GENERIC EQUIVALENT) 500 MG tablet  nitroglycerin (NITROSTAT) 0.4 MG SL tablet  Nutritional Supplements (NEPRO PO)  omega-3 acid ethyl esters (LOVAZA) 1 G  capsule  omeprazole (PRILOSEC) 40 MG capsule  pregabalin (LYRICA) 100 MG capsule  ritonavir (NORVIR) 100 MG capsule  sucroferric oxyhydroxide (VELPHORO) 500 MG CHEW chewable tablet    Past Medical History:    ACS (acute coronary syndrome)   Allergic rhinitis, cause unspecified   Bilateral pneumonia   Huang disease   Bradycardia   CAD S/P percutaneous coronary angioplasty   Cancer    Chest pain   CKD (chronic kidney disease)   Dilated aortic root   ESRD (end stage renal disease) on dialysis    Hemodialysis-associated hypotension   Human immunodeficiency virus (HIV) disease   Hypertension   Hypotension, unspecified hypotension type   Impotence of organic origin   Increased prostate specific antigen (PSA) velocity   Mixed hyperlipidemia   NSTEMI (non-ST elevated myocardial infarction)    Pulmonary HTN   TIA (transient ischemic attack)   Type 2 diabetes mellitus    Unstable angina     Past Surgical History:    Appendectomy  Cholecystectomy  Colostomy  Angiogram    Family History:    Heart Disease  Hypertension  Kidney Disease    Social History:  The patient was accompanied to the ED by his brother.  Smoking Status: former  Smokeless Tobacco: never  Alcohol Use: no  Marital Status:        Review of Systems   Respiratory: Positive for shortness of breath.    Cardiovascular: Negative for chest pain.   Gastrointestinal: Positive for abdominal pain. Negative for vomiting.   Neurological: Positive for speech difficulty and headaches.   Psychiatric/Behavioral: Positive for confusion.   All other systems reviewed and are negative.    Physical Exam   First Vitals: BP (!) 189/91  Pulse 69  Temp 97.6  F (36.4  C) (Oral)  Resp 27  SpO2 100%    Physical Exam  General: Patient is mild distress; complaining of feeling constipated.  HEENT:   The scalp and head appear normal    The pupils are equal, round, and reactive to light    Extraocular muscles are intact.    The nose is normal.    The oropharynx is normal.      Uvula  is in the midline.    Neck:  Normal range of motion.    Lungs:  Clear.      No rales, no wheezing.      There is no tachypnea.  Non-labored.  Cardiac: Holosystolic murmur best heard at left upper sternal border     Regular rate.      Normal S1 and S2.    Abdomen: Slightly distended, no localized tenderness, bowel sounds decreased, skin consistent   with dialysis patient's, soft.  MS:  Normal tone. Normal movement of all extremities.   Neurologic:     Expressive aphasia. Hand grasps weak but symmetrical. He can raise both legs off one   at a time. Normal mentation.  No cranial nerve deficits. Finger-Nose-Finger and   Heel-to-Shin done hesitantly but accurately. Partial language barrier, but he also has a       severe expressive aphasia.     .  Psych:  Awake.     Alert.      Normal affect.      Appropriate interactions.  Skin:  No rash.      No lesions.    Emergency Department Course     ECG:  Indication: shortness of breath   Completed at 1130.  Read at 1134.   Normal sinus rhythm  Minimal voltage criteria for LVH, may be normal variant  Borderline ECG  Rate 68 bpm. IN interval 172. QRS duration 94. QT/QTc 420/446. P-R-T axes 27 0 77.    Imaging:  Radiology findings were communicated with the patient who voiced understanding of the findings.    MRV Brain wo Contrast   Final Result   IMPRESSION: Normal MR venogram of the head.      MAI FAUSTIN MD      MR Brain w/o Contrast   Final Result   IMPRESSION: Volume loss and extensive confluent white matter T2 FLAIR   hyperintensity which likely represents chronic small vessel ischemic   change, advanced for age. No evidence of acute ischemia or hemorrhage.      MAI FAUSTIN MD      CT Head w/o Contrast   Final Result   Abnormal   IMPRESSION: No evidence of acute ischemia or hemorrhage.      [Critical Result: Stroke code communication]      Finding was identified on 9/1/2018 12:08 PM.       Dr. Sotomayor was contacted by me on 9/1/2018 12:10 PM and verbalized    understanding of the critical result.       MAI FAUSTIN MD      CT Head Neck Angio w/o & w Contrast   Final Result   IMPRESSION: Irregularity versus possible short segment (1 to 2 mm)   occlusion of a left middle cerebral artery inferior division temporal   branch. No large vessel occlusion. Anterior and posterior circulation   are otherwise widely patent. No flow-limiting stenosis of the carotid   arteries.      MAI FAUSTIN MD      CT Head Perfusion w Contrast   Final Result   IMPRESSION: Transit time prolongation to the left temporal lobe   suspicious for ischemia versus artifact. MRI could be considered.      Findings were discussed by phone between Dr. Faustin and Dr. Sotomayor at   12:34 PM on 9/1/2018      MAI FAUSTIN MD      CT Abdomen Pelvis w/o Contrast   Final Result   IMPRESSION:      1. Diffuse bladder wall thickening, which could reflect cystitis.   2. Status post cholecystectomy.   3. Scattered colonic diverticula without evidence of diverticulitis.       CHARLEEN JUNIOR MD        Laboratory:  Laboratory findings were communicated with the patient who voiced understanding of the findings.    Troponin: <0.015  Lactic Acid: 2.8 (H)  Magnesium: 2.1  Phosphorous: 3.3  CMP: Creatinine 5.38 (H), glucose 171 (H), BUN 34 (H), GFR 11 (L)  CBC: WBC 6.1, HGB 9.4 (L),  (L)  ISTAT gases: Ph Venous 7.45 (H), PCO2 Venous 37 (L), PO2 Venous 24 (L), lactic acid 2.8 (H)    Interventions:  1234: Zofran 4 mg IV  1330: Dilaudid 0.5 mg IV  1552: Aspirin 325 mg PO     Emergency Department Course:  Nursing notes and vitals reviewed.  I performed an exam of the patient as documented above.   The patient was sent for a CT Head Perfusion while in the emergency department, results above.   IV was inserted and blood was drawn for laboratory testing, results above.    1146: I spoke with Dr. Ibrahim of the Nephrology service regarding patient's presentation, findings, and plan of care.    1150: Patient rechecked and  updated. Code stroke called.     1154: I spoke with Dr. Haro  of the Stroke Neurology service regarding patient's presentation, findings, and plan of care.    1209: I spoke with Dr. Puente of the radiology service regarding patient's presentation, findings, and plan of care.    1215: I spoke with Dr. Chaves of the Nephrology service from [facility] regarding patient's presentation, findings, and plan of care.    1227: I spoke with Dr. Haro  of the Stroke Neurology service from regarding patient's presentation, findings, and plan of care.    1231: I spoke with Dr. Puente of the radiology service regarding patient's presentation, findings, and plan of care.    1437: I spoke with Dr. Haro  of the Stroke Neurology service from regarding patient's presentation, findings, and plan of care.     I personally reviewed the laboratory and imaging results with the Patient and brother and answered all related questions prior to admission.   Findings and plan explained to the Patient who consents to admission. Discussed the patient with Dr. Zee, who will admit the patient to a med/tele bed for further monitoring, evaluation, and treatment.    Impression & Plan        CMS Diagnoses: The patient has stroke symptoms:           ED Stroke specific documentation           NIHSS PDF          Protocol PDF     Patient last known well time: one week or more?  ED Provider first to bedside at: 1135  CT Results received at: 1209  Patient was not treated with TPA due to the following reason(s):  Mild stroke symptoms ( NIHSS < 4 and not globally aphasic)    National Institutes of Health Stroke Scale (Baseline)  Time Performed: 1145      Score    Level of consciousness: (0)   Alert, keenly responsive    LOC questions: (0)   Answers both questions correctly    LOC commands: (0)   Performs both tasks correctly    Best gaze: (0)   Normal    Visual: (0)   No visual loss    Facial palsy: (0)   Normal symmetrical movements    Motor  arm (left): (0)   No drift    Motor arm (right): (0)   No drift    Motor leg (left): (0)   No drift    Motor leg (right): (0)   No drift    Limb ataxia: (0)   Absent    Sensory: (0)   Normal- no sensory loss    Best language: (1)   Mild to moderate aphasia    Dysarthria: (0)   Normal    Extinction and inattention: (0)   No abnormality        Total Score:  1        Stroke Mimics were considered (including migraine headache, seizure disorder, hypoglycemia (or hyperglycemia), head or spinal trauma, CNS infection, Toxin ingestion and shock state (e.g. sepsis) .              National Institutes of Health Stroke Scale  Time Performed: 1400  Total Score: 1  (unchanged from last stroke score)              Critical Care Time: 40 minutes    Medical Decision Making:  Donald Raza is a 68 year old male with n-stage renal disease who was in the middle of his dialysis run today when he developed abdominal pain, became diaphoretic, nauseous. His dialysis run ended early and he was sent to the emergency department. Here, the patient began complaining of headache and developed expressive aphasia. Initially, I thought this was the language barrier then it became clear the patient was getting frustratied trying to speak and not able to say what he waned to. His brother then arrived a stated he noted the same thing occurring this week where the patient would have problems expressing what he wanted to say and expressed frustration. The brother also added the patient had been falling which is new for him and losing his balanace and feeling off balance.     At this point, focus shifted from his abdomen which seemed relatively benign on examination with no localized tenderness to his central nervous system. Because he rapidly seemed to be developing a headache on the left side and demonstrating what appeared to be expressed aphasia and a further history of falling and symptoms this week, we called a code stroke. He had a CT and a CT  angiogram of the head and neck. After discussion with the nephrologist regarding his creatinine and the fact he does not make much urine, he instructed us to go on with the CT with contrast which we did. It did not show any large vessel occlusion. I spoke with the neuro intensivist who recommended NMR Venogram and MRI which showed no acute findings. Was felt by the neuro intensivist that the patient may have had a small acute lesion in the left temporal area which could account for his symptomology possibly and recommended a full aspirin and admission for further work up including structural echocardiogram and stroke evaluation.     CT of the abdomen was done and showed no acute abdominal catastrophe. There is no free air and no explanation at this point for the patient's abdominal pain although the history is suggestive of constipation.     I am going to contact the hospitalist for admission to the medical telemetry bed.     Diagnosis:      1. Falling   2. Expressive aphasia   3. Abdominal pain, generalized     Disposition:  Admitted to a med/tele bed.     Scribe Disclosure:  I, Catrachito Cannon, am serving as a scribe at 11:36 AM on 9/1/2018 to document services personally performed by David Sotomayor MD based on my observations and the provider's statements to me.     9/1/2018   [facility] EMERGENCY DEPARTMENT              David Sotomayor MD  09/01/18 0913

## 2018-09-01 NOTE — ED NOTES
Bed: ED36  Expected date: 9/1/18  Expected time: 11:06 AM  Means of arrival: Ambulance  Comments:  BV3

## 2018-09-01 NOTE — ED NOTES
"During assessment by MD, patient having difficulty with speech, difficulty with word finding. Family member states he has been having problems with speech and balance for about a week. Patient also reporting headache that \"started when the ambulance got there\". Now unsure when headache started.   "

## 2018-09-01 NOTE — IP AVS SNAPSHOT
[place] [address] [facility]    [address]    [facility] [address]    Phone:  [phone]    Fax:  [phone]                                       After Visit Summary   9/1/2018    Donald Raza    MRN: [id number]           After Visit Summary Signature Page     I have received my discharge instructions, and my questions have been answered. I have discussed any challenges I see with this plan with the nurse or doctor.    ..........................................................................................................................................  Patient/Patient Representative Signature      ..........................................................................................................................................  Patient Representative Print Name and Relationship to Patient    ..................................................               ................................................  Date                                            Time    ..........................................................................................................................................  Reviewed by Signature/Title    ...................................................              ..............................................  Date                                                            Time          22EPIC Rev 08/18

## 2018-09-01 NOTE — ED TRIAGE NOTES
Patient was at [facility] for dialysis, got half way through and started having abdominal pain and nausea, vomiting. Sent to urgent care and they called EMS to bring him to the ED.

## 2018-09-01 NOTE — ED NOTES
[facility]  ED Nurse Handoff Report    Donald Raza is a 68 year old male   ED Chief complaint: Abdominal Pain and Shortness of Breath  . ED Diagnosis:   Final diagnoses:   Falling   Expressive aphasia   Abdominal pain, generalized     Allergies:   Allergies   Allergen Reactions     Calcium Acetate Other (See Comments)     Other reaction(s): Other, see comments  Pain in chest and back  Pain in chest area (sensitive skin)      Diagnostic X-Ray Materials Other (See Comments)     PN: renal failure     Lisinopril      Sulfa Drugs        Code Status: Full Code  Activity level - Baseline/Home:  Independent. Activity Level - Current:   Stand with Assist of 2. Lift room needed: No. Bariatric: No   Needed: No   Isolation: No. Infection: Not Applicable.     Vital Signs:   Vitals:    09/01/18 1445 09/01/18 1500 09/01/18 1515 09/01/18 1530   BP: (!) 200/92 (!) 183/94 (!) 188/91 (!) 190/92   Pulse:       Resp: 16  (!) 33 14   Temp:       TempSrc:       SpO2: 100%  99% 100%       Cardiac Rhythm:  ,   Cardiac  Cardiac Rhythm: Normal sinus rhythm  Pain level: 0-10 Pain Scale: 9  Patient confused: No. Patient Falls Risk: Yes.   Elimination Status: Has voided and dialysis patient T, Th, Sa  Patient Report - Initial Complaint: Abdominal pain, SOB. Focused Assessment: Patient having symptoms of a stroke initially, slurred speech, headache and word finding difficulty. Scan showed area of poor perfusion   Tests Performed:   MRV Brain wo Contrast   Final Result   IMPRESSION: Normal MR venogram of the head.      MAI FAUSTIN MD      MR Brain w/o Contrast   Final Result   IMPRESSION: Volume loss and extensive confluent white matter T2 FLAIR   hyperintensity which likely represents chronic small vessel ischemic   change, advanced for age. No evidence of acute ischemia or hemorrhage.      MAI FAUSTIN MD      CT Head w/o Contrast   Final Result   Abnormal   IMPRESSION: No evidence of acute ischemia or  hemorrhage.      [Critical Result: Stroke code communication]      Finding was identified on 9/1/2018 12:08 PM.       Dr. Sotomayor was contacted by me on 9/1/2018 12:10 PM and verbalized   understanding of the critical result.       MAI PUENTE MD      CT Head Neck Angio w/o & w Contrast   Final Result   IMPRESSION: Irregularity versus possible short segment (1 to 2 mm)   occlusion of a left middle cerebral artery inferior division temporal   branch. No large vessel occlusion. Anterior and posterior circulation   are otherwise widely patent. No flow-limiting stenosis of the carotid   arteries.      MAI PUENTE MD      CT Head Perfusion w Contrast   Final Result   IMPRESSION: Transit time prolongation to the left temporal lobe   suspicious for ischemia versus artifact. MRI could be considered.      Findings were discussed by phone between Dr. Puente and Dr. Sotomayor at   12:34 PM on 9/1/2018      MAI PUENTE MD      CT Abdomen Pelvis w/o Contrast   Final Result   IMPRESSION:      1. Diffuse bladder wall thickening, which could reflect cystitis.   2. Status post cholecystectomy.   3. Scattered colonic diverticula without evidence of diverticulitis.       CHARLEEN JUNIOR MD      . Abnormal Results:   Labs Ordered and Resulted from Time of ED Arrival Up to the Time of Departure from the ED   CBC WITH PLATELETS DIFFERENTIAL - Abnormal; Notable for the following:        Result Value    RBC Count 3.14 (*)     Hemoglobin 9.4 (*)     Hematocrit 30.4 (*)     MCHC 30.9 (*)     RDW 15.3 (*)     Platelet Count 123 (*)     Absolute Lymphocytes 0.7 (*)     All other components within normal limits   LACTIC ACID WHOLE BLOOD - Abnormal; Notable for the following:     Lactic Acid 2.8 (*)     All other components within normal limits   COMPREHENSIVE METABOLIC PANEL - Abnormal; Notable for the following:     Glucose 171 (*)     Urea Nitrogen 34 (*)     Creatinine 5.38 (*)     GFR Estimate 11 (*)     GFR Estimate If Black 13 (*)      All other components within normal limits   ISTAT  GASES LACTATE FEMI POCT - Abnormal; Notable for the following:     Ph Venous 7.45 (*)     PCO2 Venous 37 (*)     PO2 Venous 24 (*)     Lactic Acid 2.8 (*)     All other components within normal limits   TROPONIN I   MAGNESIUM   PHOSPHORUS   PLATELET FUNCTION P2Y12   ROUTINE UA WITH MICROSCOPIC   PULSE OXIMETRY NURSING   CARDIAC CONTINUOUS MONITORING   PERIPHERAL IV CATHETER   .   Treatments provided: IV, imaging, fluids  Family Comments: Brother at the bedside  OBS brochure/video discussed/provided to patient:  N/A  ED Medications:   Medications   lidocaine 1 % 1 mL (not administered)   lidocaine (LMX4) kit (not administered)   sodium chloride (PF) 0.9% PF flush 3 mL (not administered)   HYDROmorphone (PF) (DILAUDID) injection 0.5 mg (0.5 mg Intravenous Given 9/1/18 1330)   ondansetron (ZOFRAN) injection 4 mg (4 mg Intravenous Given 9/1/18 1234)   aspirin tablet 325 mg (not administered)   iopamidol (ISOVUE-370) solution 500 mL (120 mLs Intravenous Given 9/1/18 1203)   0.9% sodium chloride BOLUS (0 mLs Intravenous Stopped 9/1/18 1211)     Drips infusing:  No  For the majority of the shift, the patient's behavior Green. Interventions performed were N/A.     Severe Sepsis OR Septic Shock Diagnosis Present: No      ED Nurse Name/Phone Number: Alycia Bingham, (Vocera: Alycia MILES)  3:43 PM  RECEIVING UNIT ED HANDOFF REVIEW    Above ED Nurse Handoff Report was reviewed: Yes  Reviewed by: Jennifer San on September 1, 2018 at 4:25 PM

## 2018-09-01 NOTE — PHARMACY-ADMISSION MEDICATION HISTORY
Admission medication history interview status for this patient is complete. See [facility] admission navigator for allergy information, prior to admission medications and immunization status.     Medication history interview source(s):Patient  Medication history resources (including written lists, pill bottles, clinic record):None  Primary pharmacy: [facility] - [place]    Changes made to PTA medication list:  Added: gabapentin, ketoconzaole 2% cream (changed to this from terbinafine 1% cream), mirtazapine 15 mg (new start in August)  Deleted: Lyrica, mycophenolate (see note below)  Changed: none    Actions taken by pharmacist (provider contacted, etc): Reviewed PTA med list, outside med list and Care everywhere. I spoke with Donald in his room and affirmed changes.     Additional medication history information: Donald has used Incruse Ellepta 62.5 1 puff daily, however this was only while his nebulizer was not working and until he got a new one; not currently an active medication. Donald has tried docusate 100 mg but said it was not effective and he is no longer taking it for his constipation. He also uses a topical pain reliever before dialysis but did not recall the medication.    Donald took mycophenolate 500 mg twice daily but Reginald had a note it was put on hold back in February 2018. Donald confirmed he was then changed to gabapentin 300 mg twice daily with a 3rd optional dose after dialysis. He said this worked well for him. Then he was changed to Lyrica 125 mg daily however this was not effective and he had worse side effects. He has changed back to gabapentin 300 mg.    Medication reconciliation/reorder completed by provider prior to medication history? Yes    Do you take OTC medications (eg tylenol, ibuprofen, fish oil, eye/ear drops, etc)? Y (Y/N)      Prior to Admission medications    Medication Sig Last Dose Taking? Auth Provider   abacavir (ZIAGEN) 300 MG tablet Take 600 mg by mouth every evening  8/31/2018 at 2200  Yes Abstract, Provider   Acetaminophen (TYLENOL PO) Take by mouth as needed for mild pain or fever 9/1/2018 Yes Unknown, Entered By History   albuterol (PROAIR HFA/PROVENTIL HFA/VENTOLIN HFA) 108 (90 BASE) MCG/ACT Inhaler Inhale 2 puffs into the lungs every 6 hours as needed for shortness of breath / dyspnea or wheezing Past Month Yes Nelson Worthy MD   aspirin EC 81 MG EC tablet Take 1 tablet (81 mg) by mouth daily 8/31/2018 at 2200 Yes Clemencia Pal MD   atorvastatin (LIPITOR) 40 MG tablet Take 40 mg by mouth At Bedtime 8/31/2018 at 2200 Yes Unknown, Entered By History   B COMPLEX-C-FOLIC ACID PO Take 1 tablet by mouth At Bedtime  8/31/2018 at 2200 Yes Reported, Patient   carvedilol (COREG) 12.5 MG tablet Take 1 tablet (12.5 mg) by mouth 2 times daily (with meals) 8/31/2018 at 2200 Yes Clemencia Pal MD   chlorhexidine (CHLORHEXIDINE) 0.12 % solution Rinse and gargle 15 ml by mouth twice a day as directed. 8/31/2018 at 2200 Yes Abstract, Provider   CLOPIDOGREL BISULFATE PO Take 75 mg by mouth every evening  8/31/2018 at 2200 Yes Unknown, Entered By History   dapsone 100 MG tablet Take 100 mg by mouth At Bedtime  8/31/2018 at 2200 Yes Reported, Patient   Darunavir Ethanolate (PREZISTA PO) Take 800 mg by mouth At Bedtime. 8/31/2018 at 2200 Yes Reported, Patient   dolutegravir (TIVICAY) 50 MG tablet Take 50 mg by mouth At Bedtime 8/31/2018 at 2200 Yes Unknown, Entered By History   DOXERCALCIFEROL IV Inject 6 mcg into the vein three times a week (with dialysis) 8/30/2018 Yes Reported, Patient   epoetin brittni (EPOGEN,PROCRIT) 85639 UNIT/ML injection Inject 11,000 Units Subcutaneous three times a week WITH DIALYSIS 8/30/2018 Yes Unknown, Entered By History   gabapentin (NEURONTIN) 300 MG capsule Take 300 mg by mouth 2 times daily May take a third dose after dialysis. 8/31/2018 Yes Reported, Patient   hydrALAZINE (APRESOLINE) 25 MG tablet Take 1 tablet (25 mg) by mouth 3 times daily 8/31/2018 at 2200 Yes  Clemencia Pal MD   imiquimod (ALDARA) 5 % cream Apply topically as needed 8/31/2018 at PM Yes Reported, Patient   insulin glargine (LANTUS) 100 UNIT/ML injection Inject 25 Units Subcutaneous 2 times daily  8/31/2018 at 2200 Yes Unknown, Entered By History   Insulin Lispro (HUMALOG PEN SC) Take 15 units TID and an additional 2 units if BG is over 150 8/31/2018 at PM Yes Unknown, Entered By History   ipratropium - albuterol 0.5 mg/2.5 mg/3 mL (DUONEB) 0.5-2.5 (3) MG/3ML neb solution Take 1 vial (3 mLs) by nebulization every 6 hours as needed for shortness of breath / dyspnea or wheezing Past Month Yes Catrachito Rosado,    irbesartan (AVAPRO) 300 MG tablet Take 1 tablet (300 mg) by mouth At Bedtime 8/31/2018 at 2200 Yes Clemencia Pal MD   iron sucrose (VENOFER) 20 MG/ML injection Inject 50 mg into the vein once a week WIth dialysis 8/30/2018 Yes Unknown, Entered By History   Isosorbide Mononitrate  MG TB24 Take 1 tablet (120 mg) by mouth every evening 8/31/2018 at 2200 Yes Yuik Hinojosa APRN CNP   ketoconazole (NIZORAL) 2 % cream Apply topically 2 times daily as needed 8/31/2018 at PM Yes Reported, Patient   MAGNESIUM OXIDE PO Take 400 mg by mouth At Bedtime 8/31/2018 at 2200 Yes Unknown, Entered By History   mirtazapine (REMERON) 15 MG tablet Take 15 mg by mouth At Bedtime 8/31/2018 Yes Reported, Patient   Nutritional Supplements (NEPRO PO) 1-3 times daily 8/31/2018 Yes Unknown, Entered By History   omega-3 acid ethyl esters (LOVAZA) 1 G capsule Take 2 g by mouth 2 times daily 8/31/2018 at 2200 Yes Unknown, Entered By History   omeprazole (PRILOSEC) 40 MG capsule Take 40 mg by mouth daily 1 hour before dinner 8/31/2018 at 2200 Yes Unknown, Entered By History   ritonavir (NORVIR) 100 MG capsule Take 1 capsule by mouth At Bedtime  8/31/2018 at 2200 Yes Reported, Patient   sucroferric oxyhydroxide (VELPHORO) 500 MG CHEW chewable tablet Take 500 mg by mouth 3 times daily (with meals) 8/31/2018 at  "PM Yes Unknown, Entered By History   CLONAZEPAM PO Take 0.5 mg by mouth nightly as needed for anxiety (restless legs) Unknown at 2200  Unknown, Entered By History   guaiFENesin (MUCINEX) 600 MG 12 hr tablet Take by mouth as needed for congestion Unknown  Unknown, Entered By History   nitroglycerin (NITROSTAT) 0.4 MG SL tablet One tablet under the tongue every 5 minutes if needed for chest pain. May repeat every 5 minutes for a maximum of 3 doses in 15 minutes\" Unknown  Lay Paz MD   order for DME Equipment being ordered: Other: 4WW  Treatment Diagnosis: Decreased activity tolerance   Lorna Jhaveri MD           "

## 2018-09-01 NOTE — ED NOTES
"Patient with worsening headache that he reports started in the ambulance. Now with worsening slurring and intermittent aphasia. Continues to look very pale/ashen, mildly diaphoretic. Headache is \"sharp\" and he points to the left side of his head. MD in room, code stroke called at this time.  "

## 2018-09-01 NOTE — IP AVS SNAPSHOT
MRN:[id number]                      After Visit Summary   9/1/2018    Donald Raza    MRN: [id number]           Thank you!     Thank you for choosing [facility] for your care. Our goal is always to provide you with excellent care. Hearing back from our patients is one way we can continue to improve our services. Please take a few minutes to complete the written survey that you may receive in the mail after you visit. If you would like to speak to someone directly about your visit please contact Patient Relations at [phone]. Thank you!          Patient Information     Date Of Birth          1950        About your hospital stay     You were admitted on:  September 1, 2018 You last received care in the:  [place] [address] [facility]    You were discharged on:  September 8, 2018        Reason for your hospital stay       Confusional episodes thought due to inadequate blood pressure.                  Who to Call     For medical emergencies, please call 911.  For non-urgent questions about your medical care, please call your primary care provider or clinic, [phone]          Attending Provider     Provider Specialty    David Sotomayor MD Emergency Medicine    [facility]       Primary Care Provider Office Phone # Fax #    Aida Thomas [phone]      After Care Instructions     Activity       Your activity upon discharge: activity as tolerated            Diet       Follow this diet upon discharge:       Combination Diet Dialysis Diet; 3350-3450 Calories: High Consistent CHO (4-7 CHO units/meal)                  Follow-up Appointments     Follow-up and recommended labs and tests        Follow up with HD as planned on Monday, Tuesday, Thursday and Saturday.                  Additional Services     Home Care OT Referral for Hospital Discharge       OT to eval and treat    Your provider has ordered home care -  "occupational therapy. If you have not been contacted within 2 days of your discharge please call the department phone number listed on the top of this document.            Home Care PT Referral for Hospital Discharge       PT to eval and treat    Your provider has ordered home care - physical therapy. If you have not been contacted within 2 days of your discharge please call the department phone number listed on the top of this document.                  Future tests that were ordered for you     Zio Patch Holter       As recommended by Neurology to evaluate for quantifying \"burden of A fib\"                  Pending Results     Date and Time Order Name Status Description    9/3/2018 1807 Blood culture Preliminary     9/3/2018 1807 Blood culture Preliminary             Statement of Approval     Ordered          09/08/18 145  I have reviewed and agree with all the recommendations and orders detailed in this document.  EFFECTIVE NOW     Approved and electronically signed by:  Adam Cali MD             Admission Information     Date & Time Provider Department Dept. Phone    9/1/2018 Jovi Tang MD [place] [address] [facility] [phone]      Your Vitals Were     Blood Pressure Pulse Temperature Respirations Height Weight    152/69 82 98.1  F (36.7  C) (Oral) 20 1.829 m (6') 86.9 kg (191 lb 8 oz)    Pulse Oximetry BMI (Body Mass Index)                97% 25.97 kg/m2          MyChart Information     [facility] lets you send messages to your doctor, view your test results, renew your prescriptions, schedule appointments and more. To sign up, go to [email or web address] . Click on \"Log in\" on the left side of the screen, which will take you to the Welcome page. Then click on \"Sign up Now\" on the right side of the page.     You will be asked to enter the access code listed below, as well as some personal information. Please follow the directions to create your username and password.     Your access " code is: [email or web address]  Expires: 10/12/2018  2:49 PM     Your access code will  in 90 days. If you need help or a new code, please call your [facility] clinic or [phone].        Care EveryWhere ID     This is your Care EveryWhere ID. This could be used by other organizations to access your [facility] medical records  [id number]        Equal Access to Services     BOB KERN : Hadii aad ku hadasho Soomaali, waaxda luqadaha, qaybta kaalmada adeegyada, waxay idiin hayamann adeeg teofilo lamariposaradha . So [facility] [phone].    ATENCIÓN: Si habla isaías, tiene a ayala disposición servicios gratuitos de asistencia lingüística. Llame al [phone].    We comply with applicable federal civil rights laws and [place] laws. We do not discriminate on the basis of race, color, national origin, age, disability, sex, sexual orientation, or gender identity.               Review of your medicines      CONTINUE these medicines which have NOT CHANGED        Dose / Directions    abacavir 300 MG tablet   Commonly known as:  ZIAGEN        Dose:  600 mg   Take 600 mg by mouth every evening   Refills:  0       albuterol 108 (90 Base) MCG/ACT inhaler   Commonly known as:  PROAIR HFA/PROVENTIL HFA/VENTOLIN HFA   Used for:  Cough        Dose:  2 puff   Inhale 2 puffs into the lungs every 6 hours as needed for shortness of breath / dyspnea or wheezing   Quantity:  1 Inhaler   Refills:  1       aspirin 81 MG EC tablet   Used for:  Coronary artery disease, angina presence unspecified, unspecified vessel or lesion type, unspecified whether native or transplanted heart        Dose:  81 mg   Take 1 tablet (81 mg) by mouth daily   Quantity:  30 tablet   Refills:  0       atorvastatin 40 MG tablet   Commonly known as:  LIPITOR        Dose:  40 mg   Take 40 mg by mouth At Bedtime   Refills:  0       B COMPLEX-C-FOLIC ACID PO        Dose:  1 tablet   Take 1 tablet by mouth At Bedtime   Refills:  0       carvedilol 12.5 MG tablet   Commonly known  as:  COREG   Used for:  Hypertension secondary to other renal disorders        Dose:  12.5 mg   Take 1 tablet (12.5 mg) by mouth 2 times daily (with meals)   Quantity:  60 tablet   Refills:  0       chlorhexidine 0.12 % solution   Commonly known as:  PERIDEX        Rinse and gargle 15 ml by mouth twice a day as directed.   Refills:  0       CLONAZEPAM PO        Dose:  0.5 mg   Take 0.5 mg by mouth nightly as needed for anxiety (restless legs)   Refills:  0       CLOPIDOGREL BISULFATE PO        Dose:  75 mg   Take 75 mg by mouth every evening   Refills:  0       dapsone 100 MG tablet        Dose:  100 mg   Take 100 mg by mouth At Bedtime   Refills:  0       dolutegravir 50 MG tablet   Commonly known as:  TIVICAY        Dose:  50 mg   Take 50 mg by mouth At Bedtime   Refills:  0       DOXERCALCIFEROL IV        Dose:  6 mcg   Inject 6 mcg into the vein three times a week (with dialysis)   Refills:  0       epoetin brittni 52848 UNIT/ML injection   Commonly known as:  EPOGEN/PROCRIT        Dose:  14311 Units   Inject 11,000 Units Subcutaneous three times a week WITH DIALYSIS   Refills:  0       gabapentin 300 MG capsule   Commonly known as:  NEURONTIN        Dose:  300 mg   Take 300 mg by mouth 2 times daily May take a third dose after dialysis.   Refills:  0       guaiFENesin 600 MG 12 hr tablet   Commonly known as:  MUCINEX        Take by mouth as needed for congestion   Refills:  0       HUMALOG PEN SC        Take 15 units TID and an additional 2 units if BG is over 150   Refills:  0       hydrALAZINE 25 MG tablet   Commonly known as:  APRESOLINE   Used for:  Hypertension secondary to other renal disorders        Dose:  25 mg   Take 1 tablet (25 mg) by mouth 3 times daily   Quantity:  90 tablet   Refills:  0       imiquimod 5 % cream   Commonly known as:  ALDARA        Apply topically as needed   Refills:  0       insulin glargine 100 UNIT/ML injection   Commonly known as:  LANTUS        Dose:  25 Units   Inject 25  "Units Subcutaneous 2 times daily   Refills:  0       ipratropium - albuterol 0.5 mg/2.5 mg/3 mL 0.5-2.5 (3) MG/3ML neb solution   Commonly known as:  DUONEB   Used for:  Acute respiratory failure with hypoxia (H)        Dose:  1 vial   Take 1 vial (3 mLs) by nebulization every 6 hours as needed for shortness of breath / dyspnea or wheezing   Quantity:  90 vial   Refills:  1       irbesartan 300 MG tablet   Commonly known as:  AVAPRO   Used for:  Hypertension secondary to other renal disorders        Dose:  300 mg   Take 1 tablet (300 mg) by mouth At Bedtime   Quantity:  30 tablet   Refills:  0       iron sucrose 20 MG/ML injection   Commonly known as:  VENOFER        Dose:  50 mg   Inject 50 mg into the vein once a week WIth dialysis   Refills:  0       Isosorbide Mononitrate  MG Tb24   Used for:  Coronary artery disease involving native heart, angina presence unspecified, unspecified vessel or lesion type, Hypertension secondary to other renal disorders        Dose:  120 mg   Take 1 tablet (120 mg) by mouth every evening   Quantity:  30 tablet   Refills:  11       ketoconazole 2 % cream   Commonly known as:  NIZORAL        Apply topically 2 times daily as needed   Refills:  0       MAGNESIUM OXIDE PO        Dose:  400 mg   Take 400 mg by mouth At Bedtime   Refills:  0       mirtazapine 15 MG tablet   Commonly known as:  REMERON        Dose:  15 mg   Take 15 mg by mouth At Bedtime   Refills:  0       NEPRO PO        1-3 times daily   Refills:  0       nitroGLYcerin 0.4 MG sublingual tablet   Commonly known as:  NITROSTAT   Used for:  Coronary artery disease due to lipid rich plaque, Status post coronary angioplasty        One tablet under the tongue every 5 minutes if needed for chest pain. May repeat every 5 minutes for a maximum of 3 doses in 15 minutes\"   Quantity:  25 tablet   Refills:  3       omega-3 acid ethyl esters 1 g capsule   Commonly known as:  Lovaza        Dose:  2 g   Take 2 g by mouth 2 times " daily   Refills:  0       order for DME   Used for:  SOB (shortness of breath), Generalized muscle weakness        Equipment being ordered: Other: 4WW Treatment Diagnosis: Decreased activity tolerance   Quantity:  1 each   Refills:  0       PREZISTA PO        Dose:  800 mg   Take 800 mg by mouth At Bedtime.   Refills:  0       priLOSEC 40 MG capsule   Generic drug:  omeprazole        Dose:  40 mg   Take 40 mg by mouth daily 1 hour before dinner   Refills:  0       ritonavir 100 MG capsule   Commonly known as:  NORVIR        Dose:  1 capsule   Take 1 capsule by mouth At Bedtime   Refills:  0       TYLENOL PO        Take by mouth as needed for mild pain or fever   Refills:  0       VELPHORO 500 MG Chew chewable tablet   Generic drug:  sucroferric oxyhydroxide        Dose:  500 mg   Take 500 mg by mouth 3 times daily (with meals)   Refills:  0                Protect others around you: Learn how to safely use, store and throw away your medicines at www.disposemymeds.org.             Medication List: This is a list of all your medications and when to take them. Check marks below indicate your daily home schedule. Keep this list as a reference.      Medications           Morning Afternoon Evening Bedtime As Needed    abacavir 300 MG tablet   Commonly known as:  ZIAGEN   Take 600 mg by mouth every evening   Last time this was given:  600 mg on 9/7/2018 10:10 PM                                   albuterol 108 (90 Base) MCG/ACT inhaler   Commonly known as:  PROAIR HFA/PROVENTIL HFA/VENTOLIN HFA   Inhale 2 puffs into the lungs every 6 hours as needed for shortness of breath / dyspnea or wheezing                                   aspirin 81 MG EC tablet   Take 1 tablet (81 mg) by mouth daily                                   atorvastatin 40 MG tablet   Commonly known as:  LIPITOR   Take 40 mg by mouth At Bedtime   Last time this was given:  40 mg on 9/7/2018 10:10 PM                                   B COMPLEX-C-FOLIC ACID PO    Take 1 tablet by mouth At Bedtime                                   carvedilol 12.5 MG tablet   Commonly known as:  COREG   Take 1 tablet (12.5 mg) by mouth 2 times daily (with meals)   Last time this was given:  12.5 mg on 9/8/2018 12:58 PM                                      chlorhexidine 0.12 % solution   Commonly known as:  PERIDEX   Rinse and gargle 15 ml by mouth twice a day as directed.                                      CLONAZEPAM PO   Take 0.5 mg by mouth nightly as needed for anxiety (restless legs)                                   CLOPIDOGREL BISULFATE PO   Take 75 mg by mouth every evening   Last time this was given:  75 mg on 9/7/2018 10:11 PM                                   dapsone 100 MG tablet   Take 100 mg by mouth At Bedtime   Last time this was given:  100 mg on 9/7/2018 10:11 PM                                   dolutegravir 50 MG tablet   Commonly known as:  TIVICAY   Take 50 mg by mouth At Bedtime   Last time this was given:  50 mg on 9/7/2018 10:10 PM                                   DOXERCALCIFEROL IV   Inject 6 mcg into the vein three times a week (with dialysis)   Last time this was given:  2 mcg on 9/3/2018  8:45 PM                                epoetin brittni 11450 UNIT/ML injection   Commonly known as:  EPOGEN/PROCRIT   Inject 11,000 Units Subcutaneous three times a week WITH DIALYSIS   Last time this was given:  10,000 Units on 9/8/2018  8:55 AM                                gabapentin 300 MG capsule   Commonly known as:  NEURONTIN   Take 300 mg by mouth 2 times daily May take a third dose after dialysis.   Last time this was given:  300 mg on 9/8/2018 12:57 PM                                      guaiFENesin 600 MG 12 hr tablet   Commonly known as:  MUCINEX   Take by mouth as needed for congestion                                   HUMALOG PEN SC   Take 15 units TID and an additional 2 units if BG is over 150                                         hydrALAZINE 25 MG tablet  "  Commonly known as:  APRESOLINE   Take 1 tablet (25 mg) by mouth 3 times daily   Last time this was given:  25 mg on 9/8/2018 12:58 PM                                         imiquimod 5 % cream   Commonly known as:  ALDARA   Apply topically as needed                                   insulin glargine 100 UNIT/ML injection   Commonly known as:  LANTUS   Inject 25 Units Subcutaneous 2 times daily                                      ipratropium - albuterol 0.5 mg/2.5 mg/3 mL 0.5-2.5 (3) MG/3ML neb solution   Commonly known as:  DUONEB   Take 1 vial (3 mLs) by nebulization every 6 hours as needed for shortness of breath / dyspnea or wheezing   Last time this was given:  3 mLs on 9/3/2018  4:57 PM                                   irbesartan 300 MG tablet   Commonly known as:  AVAPRO   Take 1 tablet (300 mg) by mouth At Bedtime   Last time this was given:  300 mg on 9/7/2018 10:11 PM                                   iron sucrose 20 MG/ML injection   Commonly known as:  VENOFER   Inject 50 mg into the vein once a week WIth dialysis                                Isosorbide Mononitrate  MG Tb24   Take 1 tablet (120 mg) by mouth every evening   Last time this was given:  120 mg on 9/7/2018 10:11 PM                                   ketoconazole 2 % cream   Commonly known as:  NIZORAL   Apply topically 2 times daily as needed                                   MAGNESIUM OXIDE PO   Take 400 mg by mouth At Bedtime                                   mirtazapine 15 MG tablet   Commonly known as:  REMERON   Take 15 mg by mouth At Bedtime   Last time this was given:  15 mg on 9/7/2018 10:10 PM                                   NEPRO PO   1-3 times daily                                         nitroGLYcerin 0.4 MG sublingual tablet   Commonly known as:  NITROSTAT   One tablet under the tongue every 5 minutes if needed for chest pain. May repeat every 5 minutes for a maximum of 3 doses in 15 minutes\"                          "          omega-3 acid ethyl esters 1 g capsule   Commonly known as:  Lovaza   Take 2 g by mouth 2 times daily                                      order for DME   Equipment being ordered: Other: 4WW Treatment Diagnosis: Decreased activity tolerance                                PREZISTA PO   Take 800 mg by mouth At Bedtime.   Last time this was given:  800 mg on 9/7/2018 10:14 PM                                   priLOSEC 40 MG capsule   Take 40 mg by mouth daily 1 hour before dinner   Last time this was given:  40 mg on 9/8/2018 12:57 PM   Generic drug:  omeprazole                                   ritonavir 100 MG capsule   Commonly known as:  NORVIR   Take 1 capsule by mouth At Bedtime                                   TYLENOL PO   Take by mouth as needed for mild pain or fever   Last time this was given:  650 mg on 9/7/2018 12:20 AM                                   VELPHORO 500 MG Chew chewable tablet   Take 500 mg by mouth 3 times daily (with meals)   Last time this was given:  500 mg on 9/8/2018  1:52 PM   Generic drug:  sucroferric oxyhydroxide

## 2018-09-02 ENCOUNTER — APPOINTMENT (OUTPATIENT)
Dept: SPEECH THERAPY | Facility: CLINIC | Age: 68
DRG: 312 | End: 2018-09-02
Payer: MEDICARE

## 2018-09-02 ENCOUNTER — APPOINTMENT (OUTPATIENT)
Dept: PHYSICAL THERAPY | Facility: CLINIC | Age: 68
DRG: 312 | End: 2018-09-02
Attending: INTERNAL MEDICINE
Payer: MEDICARE

## 2018-09-02 LAB
ALBUMIN UR-MCNC: >499 MG/DL
ANION GAP SERPL CALCULATED.3IONS-SCNC: 11 MMOL/L (ref 3–14)
APPEARANCE UR: CLEAR
BILIRUB UR QL STRIP: NEGATIVE
BUN SERPL-MCNC: 53 MG/DL (ref 7–30)
CALCIUM SERPL-MCNC: 8.8 MG/DL (ref 8.5–10.1)
CHLORIDE SERPL-SCNC: 95 MMOL/L (ref 94–109)
CHOLEST SERPL-MCNC: 127 MG/DL
CO2 SERPL-SCNC: 25 MMOL/L (ref 20–32)
COLOR UR AUTO: YELLOW
CREAT SERPL-MCNC: 7.69 MG/DL (ref 0.66–1.25)
ERYTHROCYTE [DISTWIDTH] IN BLOOD BY AUTOMATED COUNT: 15.5 % (ref 10–15)
GFR SERPL CREATININE-BSD FRML MDRD: 7 ML/MIN/1.7M2
GLUCOSE BLDC GLUCOMTR-MCNC: 109 MG/DL (ref 70–99)
GLUCOSE BLDC GLUCOMTR-MCNC: 192 MG/DL (ref 70–99)
GLUCOSE BLDC GLUCOMTR-MCNC: 43 MG/DL (ref 70–99)
GLUCOSE BLDC GLUCOMTR-MCNC: 65 MG/DL (ref 70–99)
GLUCOSE BLDC GLUCOMTR-MCNC: 69 MG/DL (ref 70–99)
GLUCOSE BLDC GLUCOMTR-MCNC: 70 MG/DL (ref 70–99)
GLUCOSE BLDC GLUCOMTR-MCNC: 88 MG/DL (ref 70–99)
GLUCOSE SERPL-MCNC: 82 MG/DL (ref 70–99)
GLUCOSE UR STRIP-MCNC: 150 MG/DL
HBA1C MFR BLD: NORMAL % (ref 0–5.6)
HCT VFR BLD AUTO: 27.3 % (ref 40–53)
HDLC SERPL-MCNC: 26 MG/DL
HGB BLD-MCNC: 8.3 G/DL (ref 13.3–17.7)
HGB UR QL STRIP: NEGATIVE
HYALINE CASTS #/AREA URNS LPF: 1 /LPF (ref 0–2)
INTERPRETATION ECG - MUSE: NORMAL
INTERPRETATION ECG - MUSE: NORMAL
KETONES UR STRIP-MCNC: NEGATIVE MG/DL
LDLC SERPL CALC-MCNC: ABNORMAL MG/DL
LDLC SERPL DIRECT ASSAY-MCNC: 48 MG/DL
LEUKOCYTE ESTERASE UR QL STRIP: NEGATIVE
MCH RBC QN AUTO: 29.7 PG (ref 26.5–33)
MCHC RBC AUTO-ENTMCNC: 30.4 G/DL (ref 31.5–36.5)
MCV RBC AUTO: 98 FL (ref 78–100)
NITRATE UR QL: NEGATIVE
NONHDLC SERPL-MCNC: 101 MG/DL
PH UR STRIP: 8 PH (ref 5–7)
PLATELET # BLD AUTO: 106 10E9/L (ref 150–450)
PLATELET FUNCTION ASA: 394 ARU
POTASSIUM SERPL-SCNC: 5.1 MMOL/L (ref 3.4–5.3)
RBC # BLD AUTO: 2.79 10E12/L (ref 4.4–5.9)
RBC #/AREA URNS AUTO: 2 /HPF (ref 0–2)
SODIUM SERPL-SCNC: 131 MMOL/L (ref 133–144)
SOURCE: ABNORMAL
SP GR UR STRIP: 1.01 (ref 1–1.03)
SQUAMOUS #/AREA URNS AUTO: <1 /HPF (ref 0–1)
TRIGL SERPL-MCNC: 447 MG/DL
UROBILINOGEN UR STRIP-MCNC: 0 MG/DL (ref 0–2)
WBC # BLD AUTO: 4.7 10E9/L (ref 4–11)
WBC #/AREA URNS AUTO: 2 /HPF (ref 0–5)

## 2018-09-02 PROCEDURE — G0425 INPT/ED TELECONSULT30: HCPCS | Performed by: PSYCHIATRY & NEUROLOGY

## 2018-09-02 PROCEDURE — 25000132 ZZH RX MED GY IP 250 OP 250 PS 637: Mod: GY | Performed by: INTERNAL MEDICINE

## 2018-09-02 PROCEDURE — A9270 NON-COVERED ITEM OR SERVICE: HCPCS | Mod: GY | Performed by: INTERNAL MEDICINE

## 2018-09-02 PROCEDURE — 80061 LIPID PANEL: CPT | Performed by: INTERNAL MEDICINE

## 2018-09-02 PROCEDURE — 83721 ASSAY OF BLOOD LIPOPROTEIN: CPT | Performed by: INTERNAL MEDICINE

## 2018-09-02 PROCEDURE — 97116 GAIT TRAINING THERAPY: CPT | Mod: GP

## 2018-09-02 PROCEDURE — A9270 NON-COVERED ITEM OR SERVICE: HCPCS | Mod: GY | Performed by: PSYCHIATRY & NEUROLOGY

## 2018-09-02 PROCEDURE — 92526 ORAL FUNCTION THERAPY: CPT | Mod: GN | Performed by: SPEECH-LANGUAGE PATHOLOGIST

## 2018-09-02 PROCEDURE — 00000146 ZZHCL STATISTIC GLUCOSE BY METER IP

## 2018-09-02 PROCEDURE — 12000007 ZZH R&B INTERMEDIATE

## 2018-09-02 PROCEDURE — 85027 COMPLETE CBC AUTOMATED: CPT | Performed by: INTERNAL MEDICINE

## 2018-09-02 PROCEDURE — 36415 COLL VENOUS BLD VENIPUNCTURE: CPT | Performed by: INTERNAL MEDICINE

## 2018-09-02 PROCEDURE — 97530 THERAPEUTIC ACTIVITIES: CPT | Mod: GP

## 2018-09-02 PROCEDURE — 81001 URINALYSIS AUTO W/SCOPE: CPT | Performed by: INTERNAL MEDICINE

## 2018-09-02 PROCEDURE — 80048 BASIC METABOLIC PNL TOTAL CA: CPT | Performed by: INTERNAL MEDICINE

## 2018-09-02 PROCEDURE — 25000131 ZZH RX MED GY IP 250 OP 636 PS 637: Mod: GY | Performed by: INTERNAL MEDICINE

## 2018-09-02 PROCEDURE — 99233 SBSQ HOSP IP/OBS HIGH 50: CPT | Performed by: INTERNAL MEDICINE

## 2018-09-02 PROCEDURE — 92522 EVALUATE SPEECH PRODUCTION: CPT | Mod: GN | Performed by: SPEECH-LANGUAGE PATHOLOGIST

## 2018-09-02 PROCEDURE — 97161 PT EVAL LOW COMPLEX 20 MIN: CPT | Mod: GP

## 2018-09-02 PROCEDURE — 40000225 ZZH STATISTIC SLP WARD VISIT: Performed by: SPEECH-LANGUAGE PATHOLOGIST

## 2018-09-02 PROCEDURE — 40000193 ZZH STATISTIC PT WARD VISIT

## 2018-09-02 PROCEDURE — 25000132 ZZH RX MED GY IP 250 OP 250 PS 637: Mod: GY | Performed by: PSYCHIATRY & NEUROLOGY

## 2018-09-02 RX ORDER — CLOPIDOGREL BISULFATE 75 MG/1
300 TABLET ORAL ONCE
Status: COMPLETED | OUTPATIENT
Start: 2018-09-02 | End: 2018-09-02

## 2018-09-02 RX ORDER — TROLAMINE SALICYLATE 10 G/100G
CREAM TOPICAL 4 TIMES DAILY PRN
Status: DISCONTINUED | OUTPATIENT
Start: 2018-09-02 | End: 2018-09-08 | Stop reason: HOSPADM

## 2018-09-02 RX ADMIN — INSULIN ASPART 2 UNITS: 100 INJECTION, SOLUTION INTRAVENOUS; SUBCUTANEOUS at 18:40

## 2018-09-02 RX ADMIN — RITONAVIR 100 MG: 100 TABLET, FILM COATED ORAL at 20:24

## 2018-09-02 RX ADMIN — HYDRALAZINE HYDROCHLORIDE 25 MG: 25 TABLET ORAL at 11:10

## 2018-09-02 RX ADMIN — HYDRALAZINE HYDROCHLORIDE 25 MG: 25 TABLET ORAL at 17:58

## 2018-09-02 RX ADMIN — OMEPRAZOLE 40 MG: 20 CAPSULE, DELAYED RELEASE ORAL at 11:10

## 2018-09-02 RX ADMIN — ISOSORBIDE MONONITRATE 120 MG: 60 TABLET, EXTENDED RELEASE ORAL at 20:26

## 2018-09-02 RX ADMIN — IRBESARTAN 300 MG: 150 TABLET ORAL at 20:24

## 2018-09-02 RX ADMIN — CARVEDILOL 12.5 MG: 12.5 TABLET, FILM COATED ORAL at 17:58

## 2018-09-02 RX ADMIN — MIRTAZAPINE 15 MG: 15 TABLET, FILM COATED ORAL at 20:27

## 2018-09-02 RX ADMIN — INSULIN GLARGINE 20 UNITS: 100 INJECTION, SOLUTION SUBCUTANEOUS at 11:19

## 2018-09-02 RX ADMIN — DAPSONE 100 MG: 100 TABLET ORAL at 20:26

## 2018-09-02 RX ADMIN — CLOPIDOGREL 300 MG: 75 TABLET, FILM COATED ORAL at 14:06

## 2018-09-02 RX ADMIN — DARUNAVIR 800 MG: 800 TABLET, FILM COATED ORAL at 20:29

## 2018-09-02 RX ADMIN — DOLUTEGRAVIR SODIUM 50 MG: 50 TABLET, FILM COATED ORAL at 20:26

## 2018-09-02 RX ADMIN — TROLAMINE SALICYLATE: 10 CREAM TOPICAL at 20:29

## 2018-09-02 RX ADMIN — GABAPENTIN 300 MG: 300 CAPSULE ORAL at 11:10

## 2018-09-02 RX ADMIN — ABACAVIR 600 MG: 300 TABLET, FILM COATED ORAL at 20:26

## 2018-09-02 RX ADMIN — GABAPENTIN 300 MG: 300 CAPSULE ORAL at 20:27

## 2018-09-02 RX ADMIN — HYDRALAZINE HYDROCHLORIDE 25 MG: 25 TABLET ORAL at 20:24

## 2018-09-02 RX ADMIN — CLOPIDOGREL 75 MG: 75 TABLET, FILM COATED ORAL at 20:26

## 2018-09-02 RX ADMIN — ATORVASTATIN CALCIUM 40 MG: 40 TABLET, FILM COATED ORAL at 20:26

## 2018-09-02 RX ADMIN — CARVEDILOL 12.5 MG: 12.5 TABLET, FILM COATED ORAL at 11:10

## 2018-09-02 RX ADMIN — ASPIRIN 325 MG ORAL TABLET 325 MG: 325 PILL ORAL at 11:10

## 2018-09-02 ASSESSMENT — ACTIVITIES OF DAILY LIVING (ADL)
ADLS_ACUITY_SCORE: 13
ADLS_ACUITY_SCORE: 13
ADLS_ACUITY_SCORE: 12
ADLS_ACUITY_SCORE: 12
ADLS_ACUITY_SCORE: 13
ADLS_ACUITY_SCORE: 12

## 2018-09-02 NOTE — PLAN OF CARE
Problem: Patient Care Overview  Goal: Plan of Care/Patient Progress Review  Discharge Planner SLP   Patient plan for discharge: not stated  Current status: Pt seen for dysarthria evaluation.  Pt reports that sometimes he cannot get his words out, his friend reports that he sounds like he is drunk but usually able to get his words out.  Pt has very strong Khmer accent (from [place]) and friend reports that his English has always been more broken-English.  Attempted formal evaluation of patient but he kept falling asleep.  Speech is very slurred with imprecise articulation, talking to end of his breath, and attempting to talk at a fast rate which further impairs speech sounds.  Pt able to stay attentive for short therapy for dysarthria.  He imitated overexaggerated speech at a slow rate in naming days of week and months of year in imitation with mod cues with 80% accuracy.  Friend was also educated on how to help him use clear speech strategies to improve intelligibility of speech.    Barriers to return to prior living situation: Speech intelligibility is reportedly waxing and waning from highly unintelligible to moderately unintelligible  Recommendations for discharge: Ongoing SLP services to improve communication  Rationale for recommendations: speech is very difficult to understand and pt requires daily slp treatment for dysarthria and speech-language evaluation.       Entered by: Dk Ji 09/02/2018 1:01 PM

## 2018-09-02 NOTE — PLAN OF CARE
Problem: Patient Care Overview  Goal: Plan of Care/Patient Progress Review  Outcome: No Change  R/O stroke.  Possible left frontal embolic. Continues to have word find trouble and slurred speech. He is very unsteady and shaky when he stands. [place] belt, walker and assist of 2 to pivot the the bedside commode. Dialysis run T,T S.  Eval tomorrow for potassium level. Patient continues to be very lethargic. Possible discharge tomorrow, but unlikely.

## 2018-09-02 NOTE — PLAN OF CARE
Problem: Kidney Disease, Chronic/End Stage Renal Disease (Adult)  Goal: Signs and Symptoms of Listed Potential Problems Will be Absent, Minimized or Managed (Kidney Disease, Chronic/End Stage Renal Disease)  Signs and symptoms of listed potential problems will be absent, minimized or managed by discharge/transition of care (reference Kidney Disease, Chronic/End Stage Renal Disease (Adult) CPG).  Outcome: Therapy, progress toward functional goals as expected  Neurology following

## 2018-09-02 NOTE — CONSULTS
Addendum to my note below:    Due to the significant high risk drug interaction of Ticagrelor with patients anti-HIV meds I would suggest the following:    - Continue ASA 325mg po daily  - Plavix load 300mg po x1.   - Check p2y12 plt sensitivity tomorrow. If sensitive, to continue Plavix 75mg daily for 3 weeks then discontinue it.    Lynn Haro MD  Stroke Neurology    -------------------------------------    Telestroke Service Details:    Type of service telemedicine diagnostic assessment of acute neurological changes   Time/date service began 09/02/2018 10:20   Time/date service ended 09/02/2018 11:04   Reason telemedicine is appropriate patient requires assessment with a specialist for diagnosis and treatment of neurological symptoms   Mode of transmission secure interactive audio and video communication per Agulia   Originating site (patient location) [facility]    Distant site (provider location) [facility], [facility]     Stroke Note    HPI  Donald Raza is a 68 year old male with pmhx of HIV, DMII on insulin, Retinopathy/length dependent neuropathy, HTN, DLP, CAD s/p stent, ESRD on hemodialysis 3/week (~6 years), and patient denies TIA or strokes. He is on DAPT for > 2 years probably for CAD. He presented to [facility] ED yesterday after developing abdominal pain, nausea, and later headache correction through his dialysis session. In the ED he was noted to have expressive aphasia on exam, later patient admits speech difficulty. There was initially confusion when the speech difficulty started because patient didn't complain of it and his brother said he had speech issues this week too. The patient confirm that his word finding was evident on presentation but then gradually resolved. He denies difficulty with speech earlier this week. The was a concern that patient imbalance was getting worse last week. Patient said his imbalance is slowly  deteriorating for > 6 months and he started using a cane. He has severe neuropathy in the legs and he doesn't feel the ground.     Over the last few months he noticed that his dialysis session are associated with blood pressure drop, headaches, nausea and generalized weakness. He noticed these changes especially with high volume dialysis. He denies any focal neurological deficit during prior dialysis session.    He denies any new weakness, numbness, dizziness, double vision, neck or back pain. No sphincter problems. His speech currently is almost back to baseline.     CTA: Minimal arch plaque, bilateral ICA plaque (calcium/soft) without significant flow limiting stenosis, left temporal branch of MCA with area of possible high grade stenosis.  MRI: Possible left frontal cortical punctate DWI restriction, Some ADC/FLAIR correlate. Mod-severe CIMC.  TTE: recent 3/2018: cLVH, EF 55-60%, mild left atrial enlarge, mod pulm HTN, no pFO.  HbA1C: unable to detect  LDL: waiting for the direct (too high tri to just calculate)  Plavix P2Y12: 293, showing inappropriate antiplt response (non-responsive)      Impression and recommendations:    1) TIA vs minor stroke (possible punctate cortical stroke left frontal). Etiology probably an embolic stroke: thrombo-embolic vs cardioembolic. I favour the possibility of thrombo-embolic as patient have evidence of intracranial stenosis and carotid plaque. Symptoms might also be related to cerebral blood flow, especially when there is cerebral vessel stenosis which make patient more prone to cerebral flow changes hypotension. His stenosed temporal artery is probably supplying part of his temporal lobe.     - Continue ASA 325mg po daily for 3 weeks then discontinue ASA (His last cardiac stent >1 year ago so should be fine to stop). DAPT in stroke/tia cases for prolonged periods increases risk of bleeding complications.  - I suggest switching plavix to ticagrelor 90mg po bid. (Do not stop  after 3 weeks, continue it)  - Continue treating his blood pressure (his long term target is <140/90)  - Avoid sudden/rapid hypotension. I am concerned that during dialysis he has large shifts in Bp. I would suggest more frequent, lower volume and lower flow dialysis especially for the short term.  - Continue atorvastatin 40mg po daily (adjust dose if LDL out of this range )  - HBA1C target < 7 is a long term target for DM control  - TTE repeat not required.  - 30 day external cardiac monitor to evaluate for Afib burden.   - PT/OT  - PCP followup in 1 week  - Stroke clinic in 4-6 weeks      2) Progressive gait unsteadiness is most likely related to severe neuropathy in the lower extremities. He had length dependent severe neuronopathy when examined clinically involving both distal arms and legs. There is no suspicion of spine pathology clinically. MRI brain without acute cerebellar findings. Patient denies acute change.  - PT/OT assessment  - Might need a walker  - Continue his neuropathy meds  - Consider inpatient inperson general neurology consultation if symptom worse than baseline. Otherwise outpatient followup with general neurology.    3) Possible some clinical features of mild dialysis dysequilibrium syndrome. He is having more symptoms suggesting it lately. His main risk factor is his age.  - I would suggest more frequent, lower volume and lower flow dialysis especially for the short term. This will also help to avoid rapid/sudden drop of blood pressure during his dialysis which might effect cerebral flow especially when there is intracranial stenosis.    Stroke Team will sign off. Please call me back with any questions or concerns.    Lynn Haro MD  Stroke Neurology  ____________________________________    Physical Examination:  Vital Signs:  B/P: 151/63,  T: 97.2,  P: 74,  R: 18    Stroke Scales      National Institutes of Health Stroke Scale (at presentation)   NIHSS done at:  time patient  seen      Score    Level of consciousness:  (0)   Alert, keenly responsive    LOC questions:  (0)   Answers both questions correctly    LOC commands:  (0)   Performs both tasks correctly    Best gaze:  (0)   Normal    Visual:  (0)   No visual loss    Facial palsy:  (0)   Normal symmetrical movements    Motor arm (left):  (0)   No drift    Motor arm (right):  (0)   No drift    Motor leg (left):  (0)   No drift    Motor leg (right):  (0)   No drift    Limb ataxia:  (2)   Present in two limbs    Sensory:  (1)   Mild to moderate sensory loss    Best language:  (0)   Normal- no aphasia    Dysarthria:  (0)   Normal    Extinction and inattention:  (0)   No abnormality        NIHSS Total Score:  3      -No aphasia in speech assessment. However rare word finding difficulty I think is related to English being 2nd language.  -Normal facial activation. However with emotional activation Rt side rises only.  -Visual field is concentric (poor peripheral vision bilaterally). Intact central vision. (Patient had diabetic retinopathy in both eyes). It doesn't seem to be a new finding.   - decrease pinprick and soft touch in both arms and legs. Looks to be length dependent worse distally and normalize as I go up the limb.  - Patient has dysmetria in both his legs.  - Able to stand but with some bed support. Uses a cane at home.    Labs/Studies:    CBC     Recent Labs   Lab Test  09/01/18   1147   WBC  6.1   RBC  3.14*   HGB  9.4*   HCT  30.4*   PLT  123*       Basic Metabolic Panel  Recent Labs   Lab Test  09/02/18   0629   NA  131*   POTASSIUM  5.1   CHLORIDE  95   CO2  25   BUN  53*   CR  7.69*   GLC  82   PRABHA  8.8       Liver panel  Recent Labs   Lab Test  09/01/18   1146   PROTTOTAL  8.0   ALBUMIN  4.1   BILITOTAL  0.5   ALKPHOS  146   AST  19   ALT  19       INR    Recent Labs   Lab Test  11/21/17   1405   INR  0.97        Lipid Profile  Recent Labs   Lab Test  08/09/17   0818   07/23/12   0712   CHOL  125   < >  298*   HDL  33*    < >  37*   LDL  30   < >  Cannot estimate LDL when triglyceride exceeds 400 mg/dL   TRIG  311*   < >  1541*   CHOLHDLRATIO   --    --   8.1*    < > = values in this interval not displayed.       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, recurrent     Bradycardia 5/28/2016     CAD S/P percutaneous coronary angioplasty 6/15/2015     Cancer (H)      Chest pain      CKD (chronic kidney disease)     Hemodialysis     Dilated aortic root (H) 5/6/2016     ESRD (end stage renal disease) on dialysis (H) 5/6/2016     Hemodialysis-associated hypotension 5/22/2016     Human immunodeficiency virus (HIV) disease      Hypertension 2010     Hypotension, unspecified hypotension type 5/22/2016     Impotence of organic origin      Increased prostate specific antigen (PSA) velocity 08/08/2016    Awaiting bx on blood thinner     Mixed hyperlipidemia      Near syncope 2016    with hemodialysis     NSTEMI (non-ST elevated myocardial infarction) (H) 12/2015, 5/2016     Pulmonary HTN     Mod     TIA (transient ischemic attack) 5/2016     Type 2 diabetes mellitus (H) age 50     Unstable angina (H) 3/4/2016       Past Surgical History   Past Surgical History:   Procedure Laterality Date     ANGIOGRAM  03-04-16    No culprit lesions, stents widely patent      ANGIOGRAM  05-06-16    Cutting balloon ptca=Diag     APPENDECTOMY  2000     CHOLECYSTECTOMY, LAPOROSCOPIC       COLOSTOMY  09/30/1999    Temporary for diverticulitis     HC LEFT HEART CATHETERIZATION  03/12/2018     No significant change  Elevated LVEDp  LVEF 30% No PCI     HEART CATH, ANGIOPLASTY  08-18-16    LAD PCI. Stented with a 3.0 x 8 mm Xience Alpine stent.     STENT, CORONARY, S660 15/18  12/2015    VANITA=Diag, PTCA=LAD     STENT, CORONARY, S660 15/18  06/2015    VANITA=LAD       Family History   Family History   Problem Relation Age of Onset     HEART DISEASE Brother 40     CABG     KIDNEY DISEASE Sister      Hypertension Sister      HEART DISEASE Brother      Dilated aorta       Social History   Social History     Social History     Marital status:      Spouse name: N/A     Number of children: N/A     Years of education: N/A     Occupational History     Not on file.     Social History Main Topics     Smoking status: Former Smoker     Packs/day: 2.00     Years: 41.00     Types: Cigarettes     Quit date: 2003     Smokeless tobacco: Never Used     Alcohol use No     Drug use: No     Sexual activity: Not on file     Other Topics Concern     Parent/Sibling W/ Cabg, Mi Or Angioplasty Before 65f 55m? Yes     Caffeine Concern Yes     2 cups daily     Sleep Concern Yes     Special Diet No      more proteins     Exercise Yes     Cardiac rehab      Social History Narrative          Allergies   Allergen Reactions     Calcium Acetate Other (See Comments)     Other reaction(s): Other, see comments  Pain in chest and back  Pain in chest area (sensitive skin)      Diagnostic X-Ray Materials Other (See Comments)     PN: renal failure     Lisinopril      Sulfa Drugs        MedicationsNo current outpatient prescriptions on file.       Please contact the Stroke Service with any questions.  Thank you.    Lynn Haro MD     I spent 70 minutes of critical care time supervising the patient's code stroke activation. I personally reviewed all relevant labs and neuroimaging.

## 2018-09-02 NOTE — PROGRESS NOTES
[facility]    Hospitalist Progress Note    Date of Service (when I saw the patient): 09/02/2018    Assessment & Plan     68-year-old man who presents with abdominal pain while on dialysis, headache, episode of slurred speech, episodes of lightheadedness and near-syncope, and constipation.  He has a history of end-stage renal disease on hemodialysis, diabetes mellitus type 2, hypertension, HIV, coronary artery disease, diastolic heart failure and chronic anemia.    1.  Episode of slurred speech.  There is concern for CVA. Certainly is at risk as he has several risk factors for vascular disease.  According to his brother, he has had a couple of episodes where he is having word finding difficulties and slurred speech and then it resolved spontaneously.  He underwent imaging with CTA and MRI brain. He has had several echocardiograms and had a bubble study in 01/2017, which did not show any shunt.    -- Discussed case with stroke neurology, On his CTA there is maybe a short area of occlusion in a small branch of MCA. His MRI brain was read as having no acute stroke, although d/w neurology and there maybe a punctate area of stroke in frontal lobe.     -- Continue asa 325 mg daily, given a Clopidogrel load of 300 mg daily today, and on 75 mg daily, there was plan to start Brilinta but held due to interaction with HIV meds. Plan to check platelets sensitivity assay  -- Tele, cardiac monitor on discharge  -- Statin   -- PT/OT  -- It is also possible that he is having some transient hypotension, especially on dialysis which may contribute to these episodes of speech difficulties as well as his episodes of near-syncope.      2.  Abdominal pain.  The differential diagnosis includes secondary to constipation versus transient ischemia related to dialysis versus gas pains versus other.  Fortunately, his pain seems to have resolved.  He says he does not recall having this previously on dialysis.  He has been having  problems with constipation.  CT abdomen  did not reveal any specific abnormalities.    -- We will treat with a bowel regimen.  Pain resolved now.     3.  Near-syncope.  He says he has had a few of these over the past week.  He has had less intense episodes in the past, but he feels that these are worsening.  We will check orthostatic vital signs and monitor his blood pressure.  We request physical therapy evaluation to assess his walking and if he is becoming orthostatic.   4.  Headache.  He does say that he gets these periodically after dialysis but they usually resolved with Tylenol.  We will treat with pain medications for now and monitor.   5.  Hypertension/CAD.  He is on several antihypertensive medications, resume baseline home medication, he has accelerated HTN, with systolic upto 200 at times, although has had high BP in past as well, We will just resume him on the baseline medications for now , last cath was in march 2018, which showed patent stents, aggressive medical management was advised   6.  Diabetes mellitus.  We will reconcile his insulin regimen and resume.   7.  Human immunodeficiency virus.  We will reconcile his antiviral medications and resume.   8.  End-stage renal disease.  We will request a Nephrology consultation to assess his dialysis regimen if any changes may be indicated. Neurology is suggesting lower volume more frequent runs, no acute indication for dialysis today   9.  Possible cystitis on CT.  He is currently not having any symptoms associated with this.  UA negative. fup as outpatient         DVT Prophylaxis: Pneumatic Compression Devices    Code Status: Full Code    Disposition: Expected discharge in 1-2 days pending PT/OT evaluation     Tauseef Ur Chang    Interval History   Chart reviewed and patient seen. Case discussed with nursing staff.     Patient feels better today, Denies any chest pain, shortness of breath, No abdominal pain. No focal weakness or numbness, No other  complaints voiced.     -Data reviewed today: I reviewed all new labs and imaging results over the last 24 hours. I personally reviewed .  # Pain Assessment:  Current Pain Score 9/2/2018   Patient currently in pain? denies   Pain score (0-10) -   Pain location -   Pain descriptors -   CPOT pain score -        Physical Exam   Temp: 97.2  F (36.2  C) Temp src: Axillary BP: 151/63   Heart Rate: 61 Resp: 18 SpO2: 94 % O2 Device: Nasal cannula Oxygen Delivery: 2 LPM  Vitals:    09/01/18 1711   Weight: 84.7 kg (186 lb 12.8 oz)     Vital Signs with Ranges  Temp:  [97.2  F (36.2  C)-98.6  F (37  C)] 97.2  F (36.2  C)  Heart Rate:  [61-73] 61  Resp:  [12-29] 18  BP: (151-198)/(63-93) 151/63  SpO2:  [94 %-100 %] 94 %  I/O last 3 completed shifts:  In: 480 [P.O.:480]  Out: 200 [Urine:200]    GENERAL:  Awake and alert, No acute distress.  PSYCH: appropriate affect, no acute agitation   HEENT:  Neck is Supple, trachea is midline, EOMI, conjunctiva clear  CARDIOVASCULAR: Regular rate and rhythm, Normal S1, S2, no loud murmurs  PULMONARY:  Clear to auscultation bilaterally  GI: Abdomen is soft, non tender, non-distended, bowel sounds present. No rebound or guarding   SKIN:  No cyanosis or clubbing  MSK: Extremities are warm and well perfused. No pitting edema   Neuro: Awake and oriented x 3. Moving all extremities with good strength       Medications       abacavir  600 mg Oral QPM     aspirin  325 mg Oral Daily     atorvastatin  40 mg Oral At Bedtime     carvedilol  12.5 mg Oral BID w/meals     clopidogrel (PLAVIX) tablet 75 mg  75 mg Oral QPM     dapsone  100 mg Oral At Bedtime     darunavir  800 mg Oral At Bedtime     dolutegravir  50 mg Oral At Bedtime     gabapentin  300 mg Oral BID     hydrALAZINE  25 mg Oral TID     insulin aspart  15 Units Subcutaneous TID AC     insulin aspart  1-7 Units Subcutaneous TID AC     insulin aspart  1-5 Units Subcutaneous At Bedtime     insulin glargine  20 Units Subcutaneous BID     irbesartan   300 mg Oral At Bedtime     isosorbide mononitrate  120 mg Oral QPM     mirtazapine  15 mg Oral At Bedtime     omeprazole  40 mg Oral Daily     ritonavir  100 mg Oral At Bedtime     sucroferric oxyhydroxide  500 mg Oral TID w/meals       Data   All new lab data and imaging results from today have been reviewed       Recent Labs  Lab 09/02/18  1228 09/02/18  0629 09/01/18  1147 09/01/18  1146   WBC 4.7  --  6.1  --    HGB 8.3*  --  9.4*  --    MCV 98  --  97  --    *  --  123*  --    NA  --  131*  --  133   POTASSIUM  --  5.1  --  4.2   CHLORIDE  --  95  --  96   CO2  --  25  --  26   BUN  --  53*  --  34*   CR  --  7.69*  --  5.38*   ANIONGAP  --  11  --  11   PRABHA  --  8.8  --  9.3   GLC  --  82  --  171*   ALBUMIN  --   --   --  4.1   PROTTOTAL  --   --   --  8.0   BILITOTAL  --   --   --  0.5   ALKPHOS  --   --   --  146   ALT  --   --   --  19   AST  --   --   --  19   LIPASE  --   --   --  205   TROPI  --   --   --  <0.015       No results found for this or any previous visit (from the past 24 hour(s)).

## 2018-09-02 NOTE — H&P
Admitted:     09/01/2018      CHIEF COMPLAINT:  Abdominal pain and subsequent slurred speech.      HISTORY OF PRESENT ILLNESS:  Mr. Raza is a 68-year-old man with a very complex past medical history who began feeling unwell while on hemodialysis today.  He developed diffuse abdominal pains and had to stop his run early.  He said he thought he made about 2 hours of a 4-hour run.  He also was having nausea and was eventually directed to the Emergency Department.  The patient reports he has been constipated and not able to have a bowel movement for the past 3-4 days.  In the Emergency Department, the ER physician noticed that he began having some garbled speech.  Also, having difficulty finding words.  The patient's brother is also in the room and said that this also happened a couple of other times this week and resolved spontaneously.  In addition, the patient has been having worsening balance problems recently.  He has not fallen but said he has had a couple episodes where thought he was going to pass out.  His speech is now back to normal.  His abdominal pain is also better.  He also developed a severe headache shortly after dialysis, which has persisted.  He also feels very weak and tired.      PAST MEDICAL HISTORY:   1.  End-stage renal disease on hemodialysis Tuesday, Thursday, Saturday.   2.  Type 2 diabetes.   3.  Hypertension.   4.  Human immunodeficiency virus.   5.  History of pneumonias.  He had respiratory failure due to pneumonia in 2017 requiring mechanical ventilation.   6.  Coronary artery disease.  He has had previous stents placed in the left anterior descending artery in 2015.  Most recent coronary angiography in 03/2018 showed no significantly stenotic lesions.   7.  Transient ischemic attack.   8.  Pulmonary hypertension.     9.  Diastolic congestive heart failure.  Echocardiogram in 03/2018 showed moderate left ventricular hypertrophy with ejection fraction of 55%-60%.  Previous  echocardiogram 2017 did have a bubble study which showed no shunt.   10.  Appendectomy.   11.  Cholecystectomy.   12.  Anemia requiring previous transfusions.      MEDICATIONS:  Have not yet been reconciled, but a previous list included the followin.  Mycophenolate.   2.  Ziagen.   3.  Albuterol as needed.   4.  Lipitor.   5.  Carvedilol.   6.  Clonazepam.   7.  Clopidogrel.   8.  Dapsone.   9.  Prezista.   10.  Tivicay.    11.  Hydralazine.   12.  Lantus insulin 25 units twice a day.   13.  DuoNeb every 6 hours as needed.   14.  Avapro 300 mg a day.   15.  Isosorbide mononitrate 120 mg a day.   16.  Nitroglycerin as needed.   17.  Prilosec.   18.  Gabapentin.   19.  Norvir.   20.  Velphoro chewable tablet 3 times a day.   21.  Acetaminophen as needed.   22.  Aspirin 81 mg a day.   23.  B complex vitamin.   24.  Chlorhexidine gargle.   25.  Mucinex as needed.   26.  Aldara cream.   27.  Lispro insulin t.i.d. with meals.   28.  Venofer on dialysis.   29.  Magnesium oxide.   30.  Nepro nutritional supplement.   31.  Epogen.   32.  Doxercalciferol.      ALLERGIES:  CALCIUM ACETATE, LISINOPRIL, SULFA.      FAMILY HISTORY:  Reviewed and noncontributory.      SOCIAL HISTORY:  He smoked heavily in the past but quit about 15 years ago.  He lives with his brother wife and daughter.  He is originally from [place].      REVIEW OF SYSTEMS:  A complete 10-point review of systems was performed and was negative other than the pertinent positives seen in history of present illness.  It is somewhat difficult to obtain.      PHYSICAL EXAMINATION:   VITAL SIGNS:  Blood pressure is 190/92, pulse 68, temperature 98, oxygen saturation 100% on room air.   GENERAL:  He appears tired, pleasant and cooperative, somewhat anxious.   HEENT:  Pupils are round and equal.  Conjunctiva, sclerae and lids are within normal limits.   NECK:  Supple, with no masses, no thyromegaly.  Oropharynx is pink and moist.  Dentition is fair.  Lips are  within normal limits.   CARDIOVASCULAR:  Regular rhythm, no murmurs.   LUNGS:  Clear to auscultation bilaterally with normal respiratory effort.   ABDOMEN:  Soft, some tenderness in the mid epigastrium.  There is no guarding.  Bowel sounds are active.   EXTREMITIES:  There is no edema.   NEUROLOGIC:  Strength and sensation are intact in all 4 extremities.  Cranial nerves II through XII are grossly intact.   PSYCHIATRIC:  He is anxious and a bit tearful at times.  He is frustrated with his medical problems.   SKIN:  He has a dry, small patchy rash on his abdomen.      LABORATORY DATA:  Sodium 133, potassium 4.2, chloride 96, bicarbonate 26, BUN 34, creatinine 5.3, glucose 171.  Lactic acid is 2.8.  White blood cell count is 6.1, hemoglobin 9.4, platelets 123.  MR venogram of the brain shows no flow-limiting lesions.  MRI of the brain shows no stroke or acute findings.  There is small vessel ischemic disease.  CT of the abdomen shows no acute findings, maybe some cystitis.  A CT angiogram of the brain shows irregularity or possible occlusion of the left middle cerebral artery branch.      ASSESSMENT AND PLAN:  Mr. Raza is a 68-year-old man who presents with abdominal pain while on dialysis, headache, episode of slurred speech, episodes of lightheadedness and near-syncope, and constipation.  He has a history of end-stage renal disease on hemodialysis, diabetes mellitus type 2, hypertension, HIV, coronary artery disease, diastolic heart failure and chronic anemia.   1.  Abdominal pain.  The differential diagnosis includes secondary to constipation versus transient ischemia related to dialysis versus gas pains versus other.  Fortunately, his pain seems to have resolved.  He says he does not recall having this previously on dialysis.  He has been having problems with constipation.  We will treat with a bowel regimen.  CT abdomen  did not reveal any specific abnormalities.  We also will check a lipase.  Certainly  ischemia is possible as he has a mildly elevated lactic acid.  If his symptoms do not return, we will not pursue further workup at this point.   2.  Episode of slurred speech.  There is concern for TIA.  Certainly is at risk as he has several risk factors for vascular disease.  According to his brother, he has had a couple of episodes where he is having word finding difficulties and slurred speech and then it resolved spontaneously.  He underwent imaging as described above without any clear etiology for his symptoms.  The case was also discussed by the ER physician with the on-call neurologist.  The plan is to continue with full-strength aspirin a day.  He has had several echocardiograms and had a bubble study in 01/2017, which did not show any shunt.  We will defer further echocardiogram for now unless requested by Neurology.  We will request a formal Stroke Neurology consultation.  It is also possible that he is having some transient hypotension, especially on dialysis which may contribute to these episodes of speech difficulties as well as his episodes of near-syncope.   3.  Near-syncope.  He says he has had a few of these over the past week.  He has had less intense episodes in the past, but he feels that these are worsening.  We will check orthostatic vital signs and monitor his blood pressure.  We request physical therapy evaluation to assess his walking and if he is becoming orthostatic.   4.  Headache.  He does say that he gets these periodically after dialysis but they usually resolved with Tylenol.  This one is not yet resolving.  As mentioned above, imaging does not reveal any significant pathology.  We will treat with pain medications for now and monitor.   5.  Hypertension.  He is on several antihypertensive medications as listed above.  We will reconcile those and resume and monitor blood pressure and make sure it is not getting too low.   6.  Diabetes mellitus.  We will reconcile his insulin regimen  and resume.   7.  Human immunodeficiency virus.  We will reconcile his antiviral medications and resume.   8.  End-stage renal disease.  We will request a Nephrology consultation to assess his dialysis regimen if any changes may be indicated.  He also did not complete his dialysis run today and they can assess when his next dialysis should occur.   9.  Possible cystitis on CT.  He is currently not having any symptoms associated with this.  We will defer any further workup unless he develops new symptoms.         DAVID SARAVIA MD             D: 2018   T: 2018   MT: NTS      Name:     GREGORIO BRUSH   MRN:      [id number]        Account:      [id number]   :      1950        Admitted:     2018                   Document: [id number]       cc: Aida Thomas MD

## 2018-09-02 NOTE — PROGRESS NOTES
D/w Dr. Downing.  Pt is breathing ok and his BP is in the 150s.  He had a TIA vs possible CVA.    Pt is likely going to be dc'd tmrw, and then will run at his regular unit on Tuesday.  If he needs HD prior to dc, we will be called and a formal consult will be done at that time.  Thanks.

## 2018-09-02 NOTE — PROGRESS NOTES
09/02/18 1300   General Information, SLP   Type of Evaluation Dysarthria   Type of Visit Initial   Start of Care Date 09/02/18   Onset of Illness/Injury or Date of Surgery - Date 09/01/18   Referring Physician Dr. Olivera   Patient/Family Goals Statement speak more clearly   Pertinent History of Current Problem Pt has been admitted 3 times in past 6 months for fall/expressive aphasia, generalized weaknesses and CAD.   General Info Comments Per MD note:   Mr. Raza is a 68-year-old man with a very complex past medical history who began feeling unwell while on hemodialysis today.  He developed diffuse abdominal pains and had to stop his run early.  He said he thought he made about 2 hours of a 4-hour run.  He also was having nausea and was eventually directed to the Emergency Department.  The patient reports he has been constipated and not able to have a bowel movement for the past 3-4 days.  In the Emergency Department, the ER physician noticed that he began having some garbled speech.  Also, having difficulty finding words.  The patient's brother is also in the room and said that this also happened a couple of other times this week and resolved spontaneously.  In addition, the patient has been having worsening balance problems recently.  He has not fallen but said he has had a couple episodes where thought he was going to pass out.  His speech is now back to normal.  His abdominal pain is also better.  He also developed a severe headache shortly after dialysis, which has persisted.  He also feels very weak and tired.    Speech   Deficits in Speech Respiration Shallow   Deficits in Phonation Breathy quality   Deficits in Articulation Hypokinetic dysarthria   Deficits in Resonance None   Deficits in Prosody None   General Therapy Interventions   Planned Therapy Interventions Communication   Communication Improve speech intelligibility   Intervention Comments clear speech strategies,sp/casandra moraes   Clinical  Impression, SLP Eval   Criteria for Skilled Therapeutic Interventions Met Yes;Treatment indicated   SLP Diagnosis moderate dsyarthria characterized by imprecise articulation, poor breath support, possible o/m weaknesses   Functional limitations due to impairments Pt waxes and wanes from highly unintelligible to moderately intelligible   Rehab Potential Good, to achieve stated therapy goals   Rehab potential affected by he is highly motivated   Therapy Frequency Daily   Predicted Duration of Therapy Intervention (days/wks) (1 week)   Anticipated Discharge Disposition Acute Rehabilitation Facility   Risks and Benefits of Treatment have been explained. Yes   Patient, Family & other staff in agreement with plan of care Yes   Clinical Impression Comments Pt seen for dysarthria evaluation.  Pt reports that sometimes he cannot get his words out, his friend reports that he sounds like he is drunk but usually able to get his words out.  Pt has very strong [place] accent (from [place]) and friend reports that his English has always been more broken-English.  Attempted formal evaluation of patient but he kept falling asleep.  Speech is very slurred with imprecise articulation, talking to end of his breath, and attempting to talk at a fast rate which further impairs speech sounds.  Pt able to stay attentive for short therapy for dysarthria.  He imitated overexaggerated speech at a slow rate in naming days of week and months of year in imitation with mod cues with 80% accuracy.  Friend was also educated on how to help him use clear speech strategies to improve intelligibility of speech.     Total Evaluation Time      Total Evaluation Time (Minutes) 9

## 2018-09-02 NOTE — PLAN OF CARE
Problem: Patient Care Overview  Goal: Plan of Care/Patient Progress Review  Outcome: No Change  Pt AOx4 Came to floor from ED after becoming nausea and abd pain at dialysis clinic. Brother at bedside. Transferred Ext.2. Tolerating mod carb diet. IV to RA saline locked. Tele : SR w/ prolonged QT. Neurology and nephrology consulted. Continue POC.

## 2018-09-02 NOTE — PROGRESS NOTES
09/02/18 1100   Quick Adds   Type of Visit Initial PT Evaluation   Living Environment   Lives With spouse;child(jean), adult;sibling(s)   Home Accessibility bed and bath are not on the first floor;stairs to enter home;stairs within home   Number of Stairs to Enter Home 1   Number of Stairs Within Home 5  (Split level, 2 sets of 5)   Stair Railings at Home inside, present on right side   Transportation Available family or friend will provide   Living Environment Comment Split level home, needs up perform two 5 stair flights for needs, somewhat unclear on home set-up due to slurring speech   Self-Care   Usual Activity Tolerance moderate   Current Activity Tolerance poor   Equipment Currently Used at Home cane, straight   Activity/Exercise/Self-Care Comment mod-I with mobility and ADLs - per chart review pt recieves assistance with ADLs   Functional Level Prior   Ambulation 1-->assistive equipment   Transferring 1-->assistive equipment   Toileting 0-->independent   Bathing 0-->independent   Dressing 0-->independent   Eating 0-->independent   Fall history within last six months no   Which of the above functional risks had a recent onset or change? ambulation;transferring   Prior Functional Level Comment Daughter and wife do cooking/cleaning/laundry and dtr assists patient with meds and finances Per chart, daughter is PCA 7days/week, has dialysis 3x/wk due to ESRD.   General Information   Onset of Illness/Injury or Date of Surgery - Date 09/01/18   Referring Physician Lynn Haro MD   Patient/Family Goals Statement Return home, open to rehab   Pertinent History of Current Problem (include personal factors and/or comorbidities that impact the POC) 68 year old male with a history of CKD and CAD who is on Plavix and has dialysis. Admitted 9/1/18 with a HA, abdominal pain, balance complaints and slurred speech, suspected TIA   Precautions/Limitations fall precautions   Cognitive Status Examination  "  Orientation orientation to person, place and time   Level of Consciousness alert   Pain Assessment   Patient Currently in Pain No   Posture    Posture Forward head position;Kyphosis   Range of Motion (ROM)   ROM Comment BUE/BLE WNL   Strength   Strength Comments knee ext B 5/5, hip flex B 4/5   Bed Mobility   Bed Mobility Comments SBA using bedrail and HOB elevated 60deg supine<>sit   Transfer Skills   Transfer Comments min-A sit<>stand to FWW   Gait   Gait Comments CGA/mod-A with FWW, step-to, dec step length, minimal foot clearance   Sensory Examination   Sensory Perception Comments BLE neuropathy, severe   Coordination   Coordination Comments Slowed finger to nose, dysmetria B   General Therapy Interventions   Planned Therapy Interventions bed mobility training;neuromuscular re-education;gait training;strengthening;progressive activity/exercise;home program guidelines;risk factor education;transfer training   Clinical Impression   Criteria for Skilled Therapeutic Intervention yes, treatment indicated   PT Diagnosis Impaired gait and mobility   Influenced by the following impairments Balance impairment, generalized weakness   Functional limitations due to impairments Decreased independence with gait and mobility   Clinical Presentation Stable/Uncomplicated   Clinical Presentation Rationale Medically stable   Clinical Decision Making (Complexity) Low complexity   Therapy Frequency` daily   Predicted Duration of Therapy Intervention (days/wks) 3 days   Anticipated Equipment Needs at Discharge front wheeled walker   Anticipated Discharge Disposition Acute Rehabilitation Facility   Risk & Benefits of therapy have been explained Yes   Patient, Family & other staff in agreement with plan of care Yes   [facility] [employer] TM \"6 Clicks\"   2016, Trustees of [facility], under license to [employer].  All rights reserved.   6 Clicks Short Forms Basic Mobility Inpatient Short Form   [facility] AM-PAC  \"6 " "Clicks\" V.2 Basic Mobility Inpatient Short Form   1. Turning from your back to your side while in a flat bed without using bedrails? 3 - A Little   2. Moving from lying on your back to sitting on the side of a flat bed without using bedrails? 3 - A Little   3. Moving to and from a bed to a chair (including a wheelchair)? 2 - A Lot   4. Standing up from a chair using your arms (e.g., wheelchair, or bedside chair)? 3 - A Little   5. To walk in hospital room? 2 - A Lot   6. Climbing 3-5 steps with a railing? 2 - A Lot   Basic Mobility Raw Score (Score out of 24.Lower scores equate to lower levels of function) 15   Total Evaluation Time   Total Evaluation Time (Minutes) 10     "

## 2018-09-02 NOTE — PROGRESS NOTES
CM observed patient's MUSHTAQ score is elevated. He lives at home with his wife, Jennifer, daughter & brother. He has no supportive services or home care.  He has been admitted 3 times in the last 6 months for fall/expressive aphasia, generalized weakness & CAD. He has had no ED only visits. His PMH includes DM2 & CHF which is a new diagnosis this year. He is on hemodialysis through [facility] on Tues/Thur/Sat schedule. His fistula is on SAGE. He has greater than 10 prescription medications that include Plavix, Lantus, Lispro (Humalog), Remeron. His anti-virals include Ritonavir, Abacavir, Darunavir & Dolutegravir.     CM will continue to follow patient until discharge for any additional needs.     Ruby Hinojosa RN, BSN, [facility]  [phone]

## 2018-09-02 NOTE — PROGRESS NOTES
SPIRITUAL HEALTH SERVICES Progress Note  FR Med. Surg. 3    Attempted twice to see pt Donald per his request for  support.  However, upon each attempt he was sleeping.    Plan: Holiday on-call  will follow up tomorrow.    Jero Martinez M.Div., [facility]  Staff   Pager [phone]

## 2018-09-02 NOTE — PROGRESS NOTES
X coverage: notified by pharmacy that pt is not on lyrica but gabapentin. Changes made. Additionally, bg 114. Will reduce lantus by 20% to 20 units subcutaneous bid. Adjust prn.

## 2018-09-02 NOTE — PLAN OF CARE
Problem: Constipation (Adult)  Goal: Identify Related Risk Factors and Signs and Symptoms  Related risk factors and signs and symptoms are identified upon initiation of Human Response Clinical Practice Guideline (CPG).   Outcome: Completed Date Met: 09/02/18  States he no longer has stomach discomfort and he states he had a bm 3 days ago.

## 2018-09-02 NOTE — PLAN OF CARE
Problem: Constipation (Adult)  Goal: Identify Related Risk Factors and Signs and Symptoms  Related risk factors and signs and symptoms are identified upon initiation of Human Response Clinical Practice Guideline (CPG).   Outcome: Improving  Patient is alert and oriented, heavy assist of one with a gait belt. Denies chest pain and shortness of breath. 2 stools this shift. Vital signs stable, tele SR. Plan for nephrology to see.

## 2018-09-02 NOTE — PLAN OF CARE
Problem: Patient Care Overview  Goal: Plan of Care/Patient Progress Review  Discharge Planner PT      PT: Orders received, eval completed, tx initiated. 68 year old male with a history of CKD and CAD who is on Plavix and has dialysis. Admitted 9/1/18 with a HA, abdominal pain, balance complaints and slurred speech, suspected TIA.  Pt lives in a split-level home with many family members. At baseline ambulates mod-I with SEC and spouse/family help with most meals/cleaning, full ADL assistance unclear.    Patient plan for discharge: Home, open to rehab  Current status: Pt ambulates 40' CGA/mod-A with FWW. Occasional buckling (R>L) requires mod-A. Significantly dec step-lenth with step-to gait, temporary improvement with cueing, cues for FWW safety and to remain close. SBA supine<>sit with HOB elevated, min-A sit<>stand to FWW. Okay to ambulated to restroom with nursing or stand/pivot to commode with FWW.  Barriers to return to prior living situation: Stairs, current mobility  Recommendations for discharge: ARU  Rationale for recommendations: Will need ongoing skilled PT intervention to improve independence and safety with functional mobility skills prior to returning home.       Entered by: Randall Heard 09/02/2018 12:48 PM

## 2018-09-03 ENCOUNTER — APPOINTMENT (OUTPATIENT)
Dept: GENERAL RADIOLOGY | Facility: CLINIC | Age: 68
DRG: 312 | End: 2018-09-03
Attending: INTERNAL MEDICINE
Payer: MEDICARE

## 2018-09-03 ENCOUNTER — APPOINTMENT (OUTPATIENT)
Dept: OCCUPATIONAL THERAPY | Facility: CLINIC | Age: 68
DRG: 312 | End: 2018-09-03
Payer: MEDICARE

## 2018-09-03 ENCOUNTER — APPOINTMENT (OUTPATIENT)
Dept: PHYSICAL THERAPY | Facility: CLINIC | Age: 68
DRG: 312 | End: 2018-09-03
Payer: MEDICARE

## 2018-09-03 ENCOUNTER — APPOINTMENT (OUTPATIENT)
Dept: SPEECH THERAPY | Facility: CLINIC | Age: 68
DRG: 312 | End: 2018-09-03
Payer: MEDICARE

## 2018-09-03 LAB
ANION GAP SERPL CALCULATED.3IONS-SCNC: 13 MMOL/L (ref 3–14)
ANION GAP SERPL CALCULATED.3IONS-SCNC: 16 MMOL/L (ref 3–14)
BASE DEFICIT BLDA-SCNC: 5 MMOL/L
BUN SERPL-MCNC: 73 MG/DL (ref 7–30)
BUN SERPL-MCNC: 77 MG/DL (ref 7–30)
CALCIUM SERPL-MCNC: 8.4 MG/DL (ref 8.5–10.1)
CALCIUM SERPL-MCNC: 8.6 MG/DL (ref 8.5–10.1)
CHLORIDE SERPL-SCNC: 91 MMOL/L (ref 94–109)
CHLORIDE SERPL-SCNC: 92 MMOL/L (ref 94–109)
CO2 SERPL-SCNC: 22 MMOL/L (ref 20–32)
CO2 SERPL-SCNC: 22 MMOL/L (ref 20–32)
CREAT SERPL-MCNC: 10.1 MG/DL (ref 0.66–1.25)
CREAT SERPL-MCNC: 9.77 MG/DL (ref 0.66–1.25)
ERYTHROCYTE [DISTWIDTH] IN BLOOD BY AUTOMATED COUNT: 15.5 % (ref 10–15)
GFR SERPL CREATININE-BSD FRML MDRD: 5 ML/MIN/1.7M2
GFR SERPL CREATININE-BSD FRML MDRD: 5 ML/MIN/1.7M2
GLUCOSE BLDC GLUCOMTR-MCNC: 108 MG/DL (ref 70–99)
GLUCOSE BLDC GLUCOMTR-MCNC: 120 MG/DL (ref 70–99)
GLUCOSE BLDC GLUCOMTR-MCNC: 122 MG/DL (ref 70–99)
GLUCOSE BLDC GLUCOMTR-MCNC: 124 MG/DL (ref 70–99)
GLUCOSE BLDC GLUCOMTR-MCNC: 126 MG/DL (ref 70–99)
GLUCOSE BLDC GLUCOMTR-MCNC: 126 MG/DL (ref 70–99)
GLUCOSE BLDC GLUCOMTR-MCNC: 127 MG/DL (ref 70–99)
GLUCOSE BLDC GLUCOMTR-MCNC: 127 MG/DL (ref 70–99)
GLUCOSE BLDC GLUCOMTR-MCNC: 134 MG/DL (ref 70–99)
GLUCOSE BLDC GLUCOMTR-MCNC: 141 MG/DL (ref 70–99)
GLUCOSE BLDC GLUCOMTR-MCNC: 174 MG/DL (ref 70–99)
GLUCOSE BLDC GLUCOMTR-MCNC: 80 MG/DL (ref 70–99)
GLUCOSE SERPL-MCNC: 112 MG/DL (ref 70–99)
GLUCOSE SERPL-MCNC: 112 MG/DL (ref 70–99)
HBA1C MFR BLD: NORMAL % (ref 0–5.6)
HCO3 BLD-SCNC: 20 MMOL/L (ref 21–28)
HCT VFR BLD AUTO: 27.7 % (ref 40–53)
HGB BLD-MCNC: 8.6 G/DL (ref 13.3–17.7)
LACTATE BLD-SCNC: 2.1 MMOL/L (ref 0.7–2)
MCH RBC QN AUTO: 30 PG (ref 26.5–33)
MCHC RBC AUTO-ENTMCNC: 31 G/DL (ref 31.5–36.5)
MCV RBC AUTO: 97 FL (ref 78–100)
MRSA DNA SPEC QL NAA+PROBE: NEGATIVE
O2/TOTAL GAS SETTING VFR VENT: 10 %
OXYHGB MFR BLD: 90 % (ref 92–100)
PCO2 BLD: 36 MM HG (ref 35–45)
PH BLD: 7.36 PH (ref 7.35–7.45)
PLATELET # BLD AUTO: 122 10E9/L (ref 150–450)
PLATELET REACTIVITY P2Y12: 312 PRU
PO2 BLD: 82 MM HG (ref 80–105)
POTASSIUM SERPL-SCNC: 4 MMOL/L (ref 3.4–5.3)
POTASSIUM SERPL-SCNC: 5.5 MMOL/L (ref 3.4–5.3)
POTASSIUM SERPL-SCNC: 7.1 MMOL/L (ref 3.4–5.3)
RBC # BLD AUTO: 2.87 10E12/L (ref 4.4–5.9)
SODIUM SERPL-SCNC: 126 MMOL/L (ref 133–144)
SODIUM SERPL-SCNC: 130 MMOL/L (ref 133–144)
SPECIMEN SOURCE: NORMAL
WBC # BLD AUTO: 6 10E9/L (ref 4–11)

## 2018-09-03 PROCEDURE — 87641 MR-STAPH DNA AMP PROBE: CPT | Performed by: INTERNAL MEDICINE

## 2018-09-03 PROCEDURE — 36415 COLL VENOUS BLD VENIPUNCTURE: CPT | Performed by: INTERNAL MEDICINE

## 2018-09-03 PROCEDURE — 40000193 ZZH STATISTIC PT WARD VISIT: Performed by: PHYSICAL THERAPIST

## 2018-09-03 PROCEDURE — 25000128 H RX IP 250 OP 636: Performed by: INTERNAL MEDICINE

## 2018-09-03 PROCEDURE — 80048 BASIC METABOLIC PNL TOTAL CA: CPT | Performed by: INTERNAL MEDICINE

## 2018-09-03 PROCEDURE — 97535 SELF CARE MNGMENT TRAINING: CPT | Mod: GO

## 2018-09-03 PROCEDURE — 20000003 ZZH R&B ICU

## 2018-09-03 PROCEDURE — 97166 OT EVAL MOD COMPLEX 45 MIN: CPT | Mod: GO

## 2018-09-03 PROCEDURE — 87040 BLOOD CULTURE FOR BACTERIA: CPT | Performed by: INTERNAL MEDICINE

## 2018-09-03 PROCEDURE — 90937 HEMODIALYSIS REPEATED EVAL: CPT

## 2018-09-03 PROCEDURE — 63400005 ZZH RX 634: Performed by: INTERNAL MEDICINE

## 2018-09-03 PROCEDURE — 25000132 ZZH RX MED GY IP 250 OP 250 PS 637: Mod: GY | Performed by: INTERNAL MEDICINE

## 2018-09-03 PROCEDURE — 40000133 ZZH STATISTIC OT WARD VISIT

## 2018-09-03 PROCEDURE — 36415 COLL VENOUS BLD VENIPUNCTURE: CPT | Performed by: PSYCHIATRY & NEUROLOGY

## 2018-09-03 PROCEDURE — 5A1D70Z PERFORMANCE OF URINARY FILTRATION, INTERMITTENT, LESS THAN 6 HOURS PER DAY: ICD-10-PCS | Performed by: INTERNAL MEDICINE

## 2018-09-03 PROCEDURE — 25800025 ZZH RX 258: Performed by: INTERNAL MEDICINE

## 2018-09-03 PROCEDURE — 40000275 ZZH STATISTIC RCP TIME EA 10 MIN

## 2018-09-03 PROCEDURE — 92507 TX SP LANG VOICE COMM INDIV: CPT | Mod: GN | Performed by: SPEECH-LANGUAGE PATHOLOGIST

## 2018-09-03 PROCEDURE — 84132 ASSAY OF SERUM POTASSIUM: CPT | Performed by: INTERNAL MEDICINE

## 2018-09-03 PROCEDURE — 97530 THERAPEUTIC ACTIVITIES: CPT | Mod: GP | Performed by: PHYSICAL THERAPIST

## 2018-09-03 PROCEDURE — 94640 AIRWAY INHALATION TREATMENT: CPT

## 2018-09-03 PROCEDURE — G0408 INPT/TELE FOLLOW UP 35: HCPCS | Performed by: PSYCHIATRY & NEUROLOGY

## 2018-09-03 PROCEDURE — 71045 X-RAY EXAM CHEST 1 VIEW: CPT

## 2018-09-03 PROCEDURE — 40000225 ZZH STATISTIC SLP WARD VISIT: Performed by: SPEECH-LANGUAGE PATHOLOGIST

## 2018-09-03 PROCEDURE — 80048 BASIC METABOLIC PNL TOTAL CA: CPT | Performed by: PSYCHIATRY & NEUROLOGY

## 2018-09-03 PROCEDURE — 85027 COMPLETE CBC AUTOMATED: CPT | Performed by: PSYCHIATRY & NEUROLOGY

## 2018-09-03 PROCEDURE — 83036 HEMOGLOBIN GLYCOSYLATED A1C: CPT | Performed by: PSYCHIATRY & NEUROLOGY

## 2018-09-03 PROCEDURE — 25000131 ZZH RX MED GY IP 250 OP 636 PS 637: Mod: GY | Performed by: INTERNAL MEDICINE

## 2018-09-03 PROCEDURE — 99291 CRITICAL CARE FIRST HOUR: CPT | Performed by: INTERNAL MEDICINE

## 2018-09-03 PROCEDURE — 87640 STAPH A DNA AMP PROBE: CPT | Performed by: INTERNAL MEDICINE

## 2018-09-03 PROCEDURE — 25000125 ZZHC RX 250: Performed by: INTERNAL MEDICINE

## 2018-09-03 PROCEDURE — 36600 WITHDRAWAL OF ARTERIAL BLOOD: CPT

## 2018-09-03 PROCEDURE — A9270 NON-COVERED ITEM OR SERVICE: HCPCS | Mod: GY | Performed by: INTERNAL MEDICINE

## 2018-09-03 PROCEDURE — 85576 BLOOD PLATELET AGGREGATION: CPT | Performed by: PSYCHIATRY & NEUROLOGY

## 2018-09-03 PROCEDURE — 82805 BLOOD GASES W/O2 SATURATION: CPT | Performed by: INTERNAL MEDICINE

## 2018-09-03 PROCEDURE — 93010 ELECTROCARDIOGRAM REPORT: CPT | Performed by: INTERNAL MEDICINE

## 2018-09-03 PROCEDURE — A9270 NON-COVERED ITEM OR SERVICE: HCPCS | Mod: GY | Performed by: PSYCHIATRY & NEUROLOGY

## 2018-09-03 PROCEDURE — 00000146 ZZHCL STATISTIC GLUCOSE BY METER IP

## 2018-09-03 PROCEDURE — 25000132 ZZH RX MED GY IP 250 OP 250 PS 637: Mod: GY | Performed by: PSYCHIATRY & NEUROLOGY

## 2018-09-03 PROCEDURE — 93005 ELECTROCARDIOGRAM TRACING: CPT

## 2018-09-03 PROCEDURE — 83605 ASSAY OF LACTIC ACID: CPT | Performed by: INTERNAL MEDICINE

## 2018-09-03 RX ORDER — SODIUM POLYSTYRENE SULFONATE 15 G/60ML
30 SUSPENSION ORAL; RECTAL ONCE
Status: DISCONTINUED | OUTPATIENT
Start: 2018-09-03 | End: 2018-09-08

## 2018-09-03 RX ORDER — FUROSEMIDE 10 MG/ML
40 INJECTION INTRAMUSCULAR; INTRAVENOUS ONCE
Status: COMPLETED | OUTPATIENT
Start: 2018-09-03 | End: 2018-09-03

## 2018-09-03 RX ORDER — LANOLIN ALCOHOL/MO/W.PET/CERES
100 CREAM (GRAM) TOPICAL
Status: DISCONTINUED | OUTPATIENT
Start: 2018-09-03 | End: 2018-09-08 | Stop reason: HOSPADM

## 2018-09-03 RX ORDER — FOLIC ACID 1 MG/1
1 TABLET ORAL ONCE
Status: COMPLETED | OUTPATIENT
Start: 2018-09-03 | End: 2018-09-03

## 2018-09-03 RX ORDER — DEXTROSE MONOHYDRATE 25 G/50ML
25 INJECTION, SOLUTION INTRAVENOUS ONCE
Status: COMPLETED | OUTPATIENT
Start: 2018-09-03 | End: 2018-09-03

## 2018-09-03 RX ORDER — NITROGLYCERIN 20 MG/100ML
0.07-2 INJECTION INTRAVENOUS CONTINUOUS
Status: DISCONTINUED | OUTPATIENT
Start: 2018-09-03 | End: 2018-09-08 | Stop reason: HOSPADM

## 2018-09-03 RX ORDER — LABETALOL HYDROCHLORIDE 5 MG/ML
20 INJECTION, SOLUTION INTRAVENOUS
Status: DISCONTINUED | OUTPATIENT
Start: 2018-09-03 | End: 2018-09-08 | Stop reason: HOSPADM

## 2018-09-03 RX ORDER — DOXERCALCIFEROL 4 UG/2ML
2 INJECTION INTRAVENOUS
Status: COMPLETED | OUTPATIENT
Start: 2018-09-03 | End: 2018-09-03

## 2018-09-03 RX ORDER — NITROGLYCERIN 0.4 MG/1
0.4 TABLET SUBLINGUAL EVERY 5 MIN PRN
Status: DISCONTINUED | OUTPATIENT
Start: 2018-09-03 | End: 2018-09-08 | Stop reason: HOSPADM

## 2018-09-03 RX ORDER — HYDRALAZINE HYDROCHLORIDE 20 MG/ML
10 INJECTION INTRAMUSCULAR; INTRAVENOUS EVERY 4 HOURS PRN
Status: DISCONTINUED | OUTPATIENT
Start: 2018-09-03 | End: 2018-09-08 | Stop reason: HOSPADM

## 2018-09-03 RX ADMIN — FOLIC ACID 1 MG: 1 TABLET ORAL at 13:50

## 2018-09-03 RX ADMIN — TAZOBACTAM SODIUM AND PIPERACILLIN SODIUM 2.25 G: 250; 2 INJECTION, SOLUTION INTRAVENOUS at 23:26

## 2018-09-03 RX ADMIN — FUROSEMIDE 40 MG: 10 INJECTION, SOLUTION INTRAVENOUS at 17:47

## 2018-09-03 RX ADMIN — HYDRALAZINE HYDROCHLORIDE 25 MG: 25 TABLET ORAL at 08:23

## 2018-09-03 RX ADMIN — GABAPENTIN 300 MG: 300 CAPSULE ORAL at 08:23

## 2018-09-03 RX ADMIN — DEXTROSE MONOHYDRATE 25 G: 25 INJECTION, SOLUTION INTRAVENOUS at 19:12

## 2018-09-03 RX ADMIN — EPOETIN ALFA 6000 UNITS: 3000 SOLUTION INTRAVENOUS; SUBCUTANEOUS at 20:45

## 2018-09-03 RX ADMIN — SODIUM BICARBONATE 25 MEQ: 84 INJECTION INTRAVENOUS at 19:18

## 2018-09-03 RX ADMIN — HYDRALAZINE HYDROCHLORIDE 25 MG: 25 TABLET ORAL at 16:13

## 2018-09-03 RX ADMIN — IPRATROPIUM BROMIDE AND ALBUTEROL SULFATE 3 ML: 2.5; .5 SOLUTION RESPIRATORY (INHALATION) at 16:57

## 2018-09-03 RX ADMIN — INSULIN GLARGINE 12 UNITS: 100 INJECTION, SOLUTION SUBCUTANEOUS at 22:38

## 2018-09-03 RX ADMIN — ASPIRIN 325 MG ORAL TABLET 325 MG: 325 PILL ORAL at 08:23

## 2018-09-03 RX ADMIN — SODIUM CHLORIDE 300 ML: 9 INJECTION, SOLUTION INTRAVENOUS at 20:33

## 2018-09-03 RX ADMIN — SODIUM CHLORIDE 5 UNITS: 9 INJECTION, SOLUTION INTRAVENOUS at 19:15

## 2018-09-03 RX ADMIN — OMEPRAZOLE 40 MG: 20 CAPSULE, DELAYED RELEASE ORAL at 08:23

## 2018-09-03 RX ADMIN — DOXERCALCIFEROL 2 MCG: 4 INJECTION, SOLUTION INTRAVENOUS at 20:45

## 2018-09-03 RX ADMIN — CARVEDILOL 12.5 MG: 12.5 TABLET, FILM COATED ORAL at 08:23

## 2018-09-03 RX ADMIN — HYDRALAZINE HYDROCHLORIDE 10 MG: 20 INJECTION INTRAMUSCULAR; INTRAVENOUS at 18:38

## 2018-09-03 RX ADMIN — Medication 100 MG: at 16:13

## 2018-09-03 RX ADMIN — SODIUM CHLORIDE 300 ML: 9 INJECTION, SOLUTION INTRAVENOUS at 20:32

## 2018-09-03 RX ADMIN — Medication: at 20:36

## 2018-09-03 ASSESSMENT — ACTIVITIES OF DAILY LIVING (ADL)
ADLS_ACUITY_SCORE: 15
ADLS_ACUITY_SCORE: 14
ADLS_ACUITY_SCORE: 14
ADLS_ACUITY_SCORE: 15
ADLS_ACUITY_SCORE: 14
ADLS_ACUITY_SCORE: 15

## 2018-09-03 NOTE — PROGRESS NOTES
Care Transition Initial Assessment - DEAN  Reason For Consult: discharge planning  Met with: Patient    Active Problems:    TIA (transient ischemic attack)       DATA  Lives With: child(jean), adult  Living Arrangements: house  Description of Support System: Supportive, Involved  Who is your support system?: Children  Support Assessment: Adequate family and caregiver support, Adequate social supports.   Identified issues/concerns regarding health management: SW met with pts two daughters, Pura and Suzi, and pts niece outside of pt room. They report concerns about pt returning home at discharge given pts current status and needs. Pura reports that she lives with pt and is his PCA but that she works 8-5 M-F and pt is typically alone during that time. Suzi noted that she does not think pt can be alone for that time. They report living in a split level home and that pt would have to do stairs during the day.       Quality Of Family Relationships: supportive, involved  Transportation Available: car, family or friend will provide    ASSESSMENT  Cognitive Status:  alert, oriented and forgetful  Concerns to be addressed: SW informed pts daughters and niece of recommendation for ARU from PT/OT. They stated understanding and agreement with this recommendation. Discussed the requirements for ARU and that TCU could be a possible back-up option as needed. Pts niece noted that they had attempted to get pt to a TCU over a year ago but then he was discharged to home instead.     DEAN met with pt with the above family present. Pt was sleepy and reported being very cold, nursing came to the room to assist while SW present. SW informed pt that she had been speaking with pts daughters about discharge planning and recommendation for ARU. He stated agreement with this plan. Discussed possible TCU as well and pt agreeable but then closed his eyes and seemed to be sleeping through rest of conversation. SW discussed plan for SW to make  referral to ARU, provided information on this level of care. Family asked about options for location, discussed that many people go to the  but that other hospitals also have this level of care. They stated understanding. SW provided TCU list for review for options as needed. Pts daughter Suzi asked about alternative options if pt were unable to go to a facility. Pura reports that pt does have MA and that she provides PCA care for the pt. Discussed that when pt returns home it would be important to be in contact with pts  about any increased needs, need for increased PCA hours and also for possible skilled home care. They stated understanding. Discussed that SW will assist with working on placement at this time.     SW left  for ARU admissions, requesting they review pt, referral also sent.     PLAN  Patient given options and choices for discharge Yes .  Patient/family is agreeable to the plan?  Yes:   Patient Goals and Preferences: ARU .  Patient anticipates discharging to:  ARU pending acceptance .    Alley Nolen, BRIEN, LICSW

## 2018-09-03 NOTE — PROGRESS NOTES
[facility]    Hospitalist Progress Note    Date of Service (when I saw the patient): 09/03/2018    Assessment & Plan     68-year-old man who presents with abdominal pain while on dialysis, headache, episode of slurred speech, episodes of lightheadedness and near-syncope, and constipation.  He has a history of end-stage renal disease on hemodialysis, diabetes mellitus type 2, hypertension, HIV, coronary artery disease, diastolic heart failure and chronic anemia.    1.  Episode of slurred speech.  There is concern for CVA. Certainly is at risk as he has several risk factors for vascular disease.  According to his brother, he has had a couple of episodes where he is having word finding difficulties and slurred speech and then it resolved spontaneously.  He underwent imaging with CTA and MRI brain. He has had several echocardiograms and had a bubble study in 01/2017, which did not show any shunt.    -- Discussed case with stroke neurology, On his CTA there is maybe a short area of occlusion in a small branch of MCA. His MRI brain was read as having no acute stroke, although d/w neurology and there maybe a punctate area of stroke in frontal lobe.     -- Continue asa 325 mg daily, given a Clopidogrel load of 300 mg, and do Plavix 75 mg daily x 3 weeks  -- Tele, cardiac monitor on discharge  -- Statin   -- PT/OT  -- It is also possible that he is having some transient hypotension, especially on dialysis which may contribute to these episodes of speech difficulties as well as his episodes of near-syncope.     2.  Ataxia, gait changes. Possible neuropathy. I discussed case with stroke neurology who did not feel these symptoms are explained by the stroke. They recommended general neurology consult to evaluate the patient      2.  Abdominal pain.  The differential diagnosis includes secondary to constipation versus transient ischemia related to dialysis versus gas pains versus other.  Fortunately, his pain seems  to have resolved.  He says he does not recall having this previously on dialysis.  He has been having problems with constipation.  CT abdomen  did not reveal any specific abnormalities.    -- We will treat with a bowel regimen.  Pain resolved now.     3.  Near-syncope.  He says he has had a few of these over the past week.  He has had less intense episodes in the past, but he feels that these are worsening.  We will check orthostatic vital signs and monitor his blood pressure.  We request physical therapy evaluation to assess his walking and if he is becoming orthostatic.   4.  Headache.  He does say that he gets these periodically after dialysis but they usually resolved with Tylenol.  We will treat with pain medications for now and monitor.   5.  Hypertension/CAD.  He is on several antihypertensive medications, resume baseline home medication, he has accelerated HTN, with systolic upto 200 at times, although has had high BP in past as well, We will just resume him on the baseline medications for now and adjust based on trend, BP seems slightly better this morning, could probably go up on hydralazine if needed despite volume removal on dialyisis, last cath was in march 2018, which showed patent stents, aggressive medical management was advised   6.  Diabetes mellitus.  resume insulin Lantus, decrease to 12 units BID and also decrease dose of prandial insulin   7.  Human immunodeficiency virus.  We will reconcile his antiviral medications and resume.   8.  End-stage renal disease.  We will request a Nephrology consultation, plan for dialysis tomorrow   9.  Possible cystitis on CT.  He is currently not having any symptoms associated with this.  UA negative. fup as outpatient         DVT Prophylaxis: Pneumatic Compression Devices    Code Status: Full Code    Disposition: Expected discharge in 2 days pending further neuro evaluation     [facility]    Interval History   Chart reviewed and patient seen. Case  discussed with nursing staff.     Patient feels a little better, still tired, having difficulty with gait, had low sugar, Denies any chest pain, shortness of breath, No abdominal pain. Speech is better, No other focal weakness or numbness, No other complaints voiced.     -Data reviewed today: I reviewed all new labs and imaging results over the last 24 hours. I personally reviewed .  # Pain Assessment:  Current Pain Score 9/3/2018   Patient currently in pain? denies   Pain score (0-10) -   Pain location -   Pain descriptors -   CPOT pain score -        Physical Exam   Temp: 99.6  F (37.6  C) Temp src: Temporal BP: 159/68 Pulse: 60 Heart Rate: 59 Resp: 16 SpO2: 95 % O2 Device: None (Room air)    Vitals:    09/01/18 1711 09/03/18 0445   Weight: 84.7 kg (186 lb 12.8 oz) 87.5 kg (193 lb)     Vital Signs with Ranges  Temp:  [96.3  F (35.7  C)-99.6  F (37.6  C)] 99.6  F (37.6  C)  Pulse:  [60] 60  Heart Rate:  [59-64] 59  Resp:  [16-20] 16  BP: (159-183)/(68-78) 159/68  SpO2:  [94 %-98 %] 95 %       GENERAL:  Awake and alert, No acute distress.  PSYCH: appropriate affect, no acute agitation   HEENT:  Neck is Supple, trachea is midline, EOMI, conjunctiva clear  CARDIOVASCULAR: Regular rate and rhythm, Normal S1, S2, no loud murmurs  PULMONARY:  Clear to auscultation bilaterally  GI: Abdomen is soft, non tender, non-distended, bowel sounds present. No rebound or guarding   SKIN:  No cyanosis or clubbing  MSK: Extremities are warm and well perfused. No pitting edema   Neuro: Awake and oriented x 3. Moving all extremities with good strength, mild dysmetria with both arms, possible subtle left facial droop        Medications       - MEDICATION INSTRUCTIONS for Dialysis Patients -   Does not apply See Admin Instructions     abacavir  600 mg Oral QPM     aspirin  325 mg Oral Daily     atorvastatin  40 mg Oral At Bedtime     carvedilol  12.5 mg Oral BID w/meals     clopidogrel (PLAVIX) tablet 75 mg  75 mg Oral QPM     dapsone   100 mg Oral At Bedtime     darunavir  800 mg Oral At Bedtime     dolutegravir  50 mg Oral At Bedtime     gabapentin  300 mg Oral BID     hydrALAZINE  25 mg Oral TID     insulin aspart  10 Units Subcutaneous TID AC     insulin aspart  1-7 Units Subcutaneous TID AC     insulin aspart  1-5 Units Subcutaneous At Bedtime     insulin glargine  12 Units Subcutaneous BID     irbesartan  300 mg Oral At Bedtime     isosorbide mononitrate  120 mg Oral QPM     mirtazapine  15 mg Oral At Bedtime     omeprazole  40 mg Oral Daily     ritonavir  100 mg Oral At Bedtime     sucroferric oxyhydroxide  500 mg Oral TID w/meals     thiamine  100 mg Oral 3 times daily       Data   All new lab data and imaging results from today have been reviewed       Recent Labs  Lab 09/03/18  0848 09/02/18  1228 09/02/18  0629 09/01/18  1147 09/01/18  1146   WBC 6.0 4.7  --  6.1  --    HGB 8.6* 8.3*  --  9.4*  --    MCV 97 98  --  97  --    * 106*  --  123*  --    *  --  131*  --  133   POTASSIUM 5.5*  --  5.1  --  4.2   CHLORIDE 92*  --  95  --  96   CO2 22  --  25  --  26   BUN 73*  --  53*  --  34*   CR 9.77*  --  7.69*  --  5.38*   ANIONGAP 16*  --  11  --  11   PRABHA 8.4*  --  8.8  --  9.3   *  --  82  --  171*   ALBUMIN  --   --   --   --  4.1   PROTTOTAL  --   --   --   --  8.0   BILITOTAL  --   --   --   --  0.5   ALKPHOS  --   --   --   --  146   ALT  --   --   --   --  19   AST  --   --   --   --  19   LIPASE  --   --   --   --  205   TROPI  --   --   --   --  <0.015       No results found for this or any previous visit (from the past 24 hour(s)).

## 2018-09-03 NOTE — PROGRESS NOTES
SPIRITUAL HEALTH SERVICES  Progress Note  [facility]  351    Visit with pt per  request.  Donald was eating his dinner and visiting with his brother, Javier.  He wanted me to say a prayer for healing.  I let him know that [facility] is available if he wanted another visit or prayer.        Amanda Fuller    Pager [phone]

## 2018-09-03 NOTE — PLAN OF CARE
Patient is alert and oriented, heavy assist of one with a gait belt. Denies chest pain and shortness of breath. 2 stools this shift. Vital signs stable, tele SR/SB. HD, possible run tomorrow. Nephro following. PT/OT. Low BS this shift, 33-05-91-. MD notified and HS insulin held. Asymptomatic.

## 2018-09-03 NOTE — PROGRESS NOTES
Renal Medicine       Plan am dialysis  Dialysis orders entered          Recent Labs  Lab 09/03/18  0848   *   POTASSIUM 5.5*   CHLORIDE 92*   CO2 22   ANIONGAP 16*   *   BUN 73*   CR 9.77*   GFRESTIMATED 5*   GFRESTBLACK 6*   PRABHA 8.4*           JAMAL Fuller    [facility]  [phone]

## 2018-09-03 NOTE — PLAN OF CARE
Problem: Patient Care Overview  Goal: Plan of Care/Patient Progress Review  Outcome: Improving  Admitted for R/o CVA.  Tele neuro following. Likely TIA. Dysarthria, speech following. Dialysis run tomorrow. Nepho following. Noc/early am Blood sugars are low. MD to evaluate. Possible discharge to home after tomorrows run.

## 2018-09-03 NOTE — PROGRESS NOTES
09/03/18 0857   Quick Adds   Type of Visit Initial Occupational Therapy Evaluation   Living Environment   Lives With spouse;child(jean), adult;sibling(s)   Living Arrangements house   Home Accessibility bed and bath are not on the first floor;stairs to enter home;stairs within home   Number of Stairs to Enter Home 1   Number of Stairs Within Home 5  (Split level, 2 sets of 5)   Stair Railings at Home inside, present on right side   Transportation Available car;family or friend will provide   Living Environment Comment Pt lives with spouse, brother and daughter in split level house, needs to perform cruz 5 stair flights to needs, tub/shower with grab bars and shower chair, RTS.    Self-Care   Dominant Hand right   Usual Activity Tolerance moderate   Current Activity Tolerance fair   Regular Exercise no   Equipment Currently Used at Home cane, straight;grab bar   Functional Level Prior   Ambulation 1-->assistive equipment   Transferring 1-->assistive equipment   Toileting 0-->independent   Bathing 1-->assistive equipment   Dressing 0-->independent   Eating 0-->independent   Communication 0-->understands/communicates without difficulty   Swallowing 0-->swallows foods/liquids without difficulty   Cognition 0 - no cognition issues reported   Fall history within last six months no   Which of the above functional risks had a recent onset or change? transferring;toileting;bathing;dressing   Prior Functional Level Comment Pt reports mod I/indep in all ADLs, daughter/spouse complete cooking, cleaning, laundry and daughter assists with meds and finances. Per chart, daughter is PCA 7 days/week, pt has dialysis 3x/wk due to ESRD   General Information   Onset of Illness/Injury or Date of Surgery - Date 09/01/18   Referring Physician Lynn Haro MD   Patient/Family Goals Statement Pt's goal is to d/c home   Additional Occupational Profile Info/Pertinent History of Current Problem Per chart: Pt  is a 68 year old male  with a history of CKD and CAD who is on Plavix and has dialysis. Admitted 9/1/18 with a HA, abdominal pain, balance complaints and slurred speech, TIA vs minor stroke (possible punctate cortical stroke left frontal).   Precautions/Limitations fall precautions   Cognitive Status Examination   Orientation person;place   Level of Consciousness alert   Able to Follow Commands WNL/WFL   Visual Perception   Visual Perception Wears glasses   Sensory Examination   Sensory Comments Pt reports baseline neuropathy in BUEs and BLEs   Pain Assessment   Patient Currently in Pain No   Range of Motion (ROM)   ROM Comment Limited B shoulder ROM, all other joints WFL   Strength   Strength Comments BUE MMT:  4-/5   Hand Strength   Hand Strength Comments Generalized weakness B gross grasp   Coordination   Upper Extremity Coordination Left UE impaired;Right UE impaired   Fine Motor Coordination BUEs impaired   Coordination Comments Noted tremoring in BUEs impacting ability to manipulate items on breakfast tray   Transfer Skills   Transfer Comments Initiated - see OT daily note for details   Transfer Skill: Bed to Chair/Chair to Bed   Level of [place]: Bed to Chair minimum assist (75% patients effort)   Physical Assist/Nonphysical Assist: Bed to Chair 1 person assist   Weight-Bearing Restrictions weight-bearing as tolerated   Assistive Device - Transfer Skill Bed to Chair Chair to Bed Rehab Eval rolling walker   Transfer Skill: Sit to Stand   Level of [place]: Sit/Stand minimum assist (75% patients effort)   Physical Assist/Nonphysical Assist: Sit/Stand 1 person assist   Transfer Skill: Sit to Stand weight-bearing as tolerated   Assistive Device for Transfer: Sit/Stand rolling walker   Balance   Balance Comments CGA/min A while in stance FWW level   Upper Body Dressing   Level of [place]: Dress Upper Body minimum assist (75% patients effort)   Physical Assist/Nonphysical Assist: Dress Upper Body 1 person assist   Lower  Body Dressing   Level of [place]: Dress Lower Body moderate assist (50% patients effort)   Physical Assist/Nonphysical Assist: Dress Lower Body 1 person assist   Toileting   Level of [place]: Toilet maximum assist (25% patients effort)   Physical Assist/Nonphysical Assist: Toilet 1 person assist   Grooming   Level of [place]: Grooming minimum assist (75% patients effort)   Physical Assist/Nonphysical Assist: Grooming 1 person assist   Eating/Self Feeding   Level of [place]: Eating minimum assist (75% patients effort)   Physical Assist/Nonphysical Assist: Eating 1 person assist   Instrumental Activities of Daily Living (IADL)   IADL Comments Daughter/spouse assist with IADLs   Activities of Daily Living Analysis   Impairments Contributing to Impaired Activities of Daily Living balance impaired;cognition impaired;coordination impaired;motor control impaired;strength decreased;sensation decreased   ADL Comments Pt presents to OT below baseline level of functioning in all areas of self cares including bathing, dressing, grooming, toileting, transfers   General Therapy Interventions   Planned Therapy Interventions ADL retraining;IADL retraining;cognition;fine motor coordination training;strengthening;transfer training   Clinical Impression   Criteria for Skilled Therapeutic Interventions Met yes, treatment indicated   OT Diagnosis Impaired ADLs, IADLs and mobility tasks   Influenced by the following impairments Decreased strength and functional activity tolerance, impaired BUE coordination, impaired balance   Assessment of Occupational Performance 5 or more Performance Deficits   Identified Performance Deficits Bathing, dressing, grooming, toileting, transfers   Clinical Decision Making (Complexity) Moderate complexity   Therapy Frequency daily   Predicted Duration of Therapy Intervention (days/wks) 4 days   Anticipated Discharge Disposition Acute Rehabilitation Facility   Risks and Benefits of  "Treatment have been explained. Yes   Patient, Family & other staff in agreement with plan of care Yes   Clinical Impression Comments Pt would benefit from skilled OT to maximize safety and indep in all ADLs, IADLs and mobility tasks due to current deficits impacting function    [facility] AM-PAC  \"6 Clicks\" Daily Activity Inpatient Short Form   1. Putting on and taking off regular lower body clothing? 2 - A Lot   2. Bathing (including washing, rinsing, drying)? 2 - A Lot   3. Toileting, which includes using toilet, bedpan or urinal? 2 - A Lot   4. Putting on and taking off regular upper body clothing? 3 - A Little   5. Taking care of personal grooming such as brushing teeth? 3 - A Little   6. Eating meals? 3 - A Little   Daily Activity Raw Score (Score out of 24.Lower scores equate to lower levels of function) 15   Total Evaluation Time   Total Evaluation Time (Minutes) 10     "

## 2018-09-03 NOTE — PROVIDER NOTIFICATION
Pt satting at 84% RA, placed on 10L oxymask with resp therapy present, O2 sats will not go above 90%, Are you able to come see pt? Thanks    Cross covering hospitalist arrived to see pt. /68. Starting PRN BP meds and setting up for bIPAP if needed along with tx to ICU.

## 2018-09-03 NOTE — PROVIDER NOTIFICATION
"Critical potassium 7.1.   MD paged      1923 \"/53.Cont HR 56. Do you want to continue with nitro drip? Pt too lethargic for oral k-acelate. \"  "

## 2018-09-03 NOTE — PLAN OF CARE
Problem: Patient Care Overview  Goal: Plan of Care/Patient Progress Review  Discharge Planner PT    Patient plan for discharge: Home, open to rehab  Current status: Mod A x 1 sit <> stand transfers, tremors/incoordinative shaking of BLEs with sit <> stand transitions.  HA reported and some dizziness, BP noted to be elevated 179/73, deferred further activity.  Impaired balance noted.  Barriers to return to prior living situation: Stairs, current mobility  Recommendations for discharge: ARU  Rationale for recommendations: Will need ongoing skilled PT intervention to improve independence and safety with functional mobility skills prior to returning home.  Appears may be good candidate for ARU given complexity of deficits, need for multi-disciplinary therapy involvement and close medical monitoring.  Discharge Planner PT          Entered by: Nasim Calderon 09/03/2018 3:41 PM

## 2018-09-03 NOTE — PLAN OF CARE
Problem: Patient Care Overview  Goal: Plan of Care/Patient Progress Review  Discharge Planner SLP   Patient plan for discharge: not stated  Current status: Pt seen for dysarthria treatment.  Instructed patient in clear speech strategies and he was able to repeat them back to therapist immediately and 5 minutes into session during tasks.  Pt practiced clear speech strategies in imitation of adl 1-2 word phrases with 90% accuracy.  He imitated 3-5 word phrases with overexaggerated mouth movements, slow speech and saying each sound in the word with 85% acc with min cue to separate words.  He was able to explain why he needs to slow his speech down with 100% accuracy.  Fifteen minutes into session pt c/o of not being able to stay awake and wanting to stop and go to sleep.  Eyes closed during last 5 minutes of session.  Left practice sheets with instructions bedside.  Low tolerance for tx 2 days now due to fatigue.  Pt is motivated to improve his speech intelligibility. Nursing reports that his blood sugars are very low and that could be a reason for extreme sleepiness at this time.  Barriers to return to prior living situation: Speech intelligibility waxing and waning from highly unintelligible to moderately intelligible  Recommendations for discharge: Ongoing SLP services to address communication  Rationale for recommendations: Speech can be highly unintelligible, needs daily dysarthria treatment and speech/language evaluation when able to remain awake for more than 15 minutes.       Entered by: Dk Ji 09/03/2018 8:11 AM

## 2018-09-03 NOTE — PROGRESS NOTES
"Critical Care/Cross Coverage:    I was contacted by the patient's nurse regarding fairly rapid respiratory decompensation over the course of the past few hours.  Chart reviewed, patient seen and examined.  Briefly, the patient is a 68-year-old man with history of end-stage renal disease, accelerated hypertension, diastolic CHF and HIV on retroviral therapy who presented with possible strokelike symptoms on 9/1.  He is due for dialysis tomorrow morning.    /61 (BP Location: Right arm)  Pulse 60  Temp 99.7  F (37.6  C) (Axillary)  Resp (!) 32  Ht 1.803 m (5' 11\")  Wt 87.5 kg (193 lb)  SpO2 95%  BMI 26.92 kg/m2  On my initial evaluation his blood pressure was actually in the 190 systolic.  Respiratory rate was about 36 at that point.  On exam he is alert and awake though looks ill and fatigued  Lungs with bilateral coarse lung sounds  Heart regular rate and rhythm  Abdomen soft and benign  No focal neurologic deficits, able to answer questions within reason.  Extremities with trace edema    A/P:  acute hypoxemic respiratory failure With wet/coarse lung sounds. Most likely differential diagnosis includes Pulmonary edema from acute on chronic diastolic heart failure in the setting of accelerated hypertension versus aspiration pneumonitis versus pneumonia in the setting of recent strokelike symptoms.  At this point is requiring 10 L of supplemental oxygen by Oxymizer mask, up from 2-3 L earlier this afternoon.  --Stat chest x-ray appears to show possible right upper lobe infiltrate.  Will check blood cultures, lactic acid and start Zosyn  --Blood pressure control with IV labetalol and IV hydralazine to start with.  We will also trial BiPAP in the short-term as I think there is a good chance a component of this is heart failure.  If bp control not adequate with the above   --Discussed CODE STATUS with him, if need be he would be open to intubation.  --Transferred to the ICU for close monitoring and " anticipatory cares.  --If hypoxia/symptoms do not improve dramatically with the above cares he may need hemodialysis tonight.    Update: Potassium returned at 7.1.  EKG shows peaked T waves.  I discussed the case with nephrology(Dr. Fuller) who is making arrangements for urgent dialysis tonight.  In the meantime I am giving shifting medications with IV insulin, dextrose, bicarbonate.  He has a history of an allergic reaction with calcium so that will be held for now.      Evaluation and management time exclusive of procedures was 50 minutes critical care time including: urgent examination and evaluation of the patient, discussion of the patient's condition with other physicians and members of the care team, reviewing data and chart related to the patient, discussion of patient's condition with the family and time utilizing the EMR for documentation of this patient's care.

## 2018-09-03 NOTE — PLAN OF CARE
Problem: Patient Care Overview  Goal: Plan of Care/Patient Progress Review  OT: Order received, eval completed and treatment initiated. Per chart: Pt  is a 68 year old male with a history of CKD and CAD who is on Plavix and has dialysis. Admitted 9/1/18 with a HA, abdominal pain, balance complaints and slurred speech, TIA vs minor stroke (possible punctate cortical stroke left frontal). Pt lives with spouse, brother and daughter in split level house, needs to perform cruz 5 stair flights to needs, tub/shower with grab bars and shower chair, RTS. Pt reports mod I/indep in all ADLs, daughter/spouse complete cooking, cleaning, laundry and daughter assists with meds and finances. Per chart, daughter is PCA 7 days/week, pt has dialysis 3x/wk due to ESRD    Discharge Planner OT   Patient plan for discharge: Open to rehab options  Current status: Pt required min A for sit <> stand from chair to FWW, unsteady balance noted. Pt required mod/max A for LE dressing task, min a for grooming and self feeding as pt with BUE tremoring impacting ability to manage and manipulate small packages on breakfast tray. Decreased UE strength noted B. Limited session as pt requesting to eat.   Barriers to return to prior living situation: Stairs, current level of assist for ADLs/transfers, impaired balance, decreased strength/functional activity tolerance, impaired BUE coordination/tremoring  Recommendations for discharge: ARU  Rationale for recommendations: Pt is significantly below baseline level of functioning with ADLs and transfers and would benefit from skilled OT to maximize safety and indep in these areas through improved strength, functional activity tolerance, balance, coordination and safety. Pt motivated to participate.        Entered by: Areli Ramírez 09/03/2018 9:21 AM

## 2018-09-03 NOTE — PROGRESS NOTES
"    Telestroke Service Details:    Type of service telemedicine diagnostic assessment of acute neurological changes   Time/date service began 09/03/2018 12:00   Time/date service ended 09/03/2018 12:30   Reason telemedicine is appropriate patient requires assessment with a specialist for diagnosis and treatment of neurological symptoms   Mode of transmission secure interactive audio and video communication per [employer]   Originating site (patient location) [facility]    Distant site (provider location) [facility], [place]     .  Vascular Neurology Progress Note  ____________________________________________________________    Admission Summary:  Donald Raza is a 68 year old male with pmhx of HIV, DMII on insulin, Retinopathy/length dependent neuropathy, HTN, DLP, CAD s/p stent, ESRD on hemodialysis 3/week (~6 years), and patient denies TIA or strokes. He is on DAPT for > 2 years for CAD(stents). He presented to [facility] ED  after developing abdominal pain, nausea, and later headache longterm through his dialysis session. In the ED he was noted to have expressive aphasia on exam, later patient admits speech difficulty.   CTA: Minimal arch plaque, bilateral ICA plaque (calcium/soft) without significant flow limiting stenosis, left anterior temporal branch of MCA with area of possible high grade stenosis.  MRI: Possible left frontal cortical punctate DWI restriction, Some ADC/FLAIR correlate. Mod-severe CIMC.  TTE: recent 3/2018: cLVH, EF 55-60%, mild left atrial enlarge, mod pulm HTN, no pFO.  LDL: 48  Plavix P2Y12: 293, --> 312 after plavix 300mg load.  ASA sen: 394 (adequate response)    Last 24 hours:      Dysarthria persists   Inability to walk without help  Significant astrixes  Hypoglycemia    Impression and recommendations  1) TIA. Left \"hemispheric\". Etiology either thrombo-embolic vs cardioembolic.   - Continue ASA 325mg po daily (no resistance)  - Continue plavix 75mg po " daily for 3 weeks then discontinue plavix. P2y12 assay show weak response. Reliability of these assays in the literature are questionable in stroke patients, so I think its okay to continue. There is a lot drug interaction with Ticagrelor so wasn't a good choice.  - Continue treating his blood pressure (his long term target is <140/90)  - Continue atorvastatin 40mg po daily (Target LDL )  - long term A1C target < 7  - I would suggest more frequent, lower volume and lower flow dialysis especially for the short term. This will help to avoid rapid/sudden drop of blood pressure during his dialysis which might effect cerebral flow.  - 30 day event monitor on discharge  - PT/OT  - PCP followup in 1 week  - Stroke clinic in 4-6 weeks    2) Significant change in gait. Its unlikely related to his TIA.  He is known with lower limb neuropathy which required him to use a cane in the past. He complains of minimal back pain, no urinary retention, spotting in stool for last week, no clear new weakness, however significant dysmetria/aterixes in his legs. Asterixis in arms too.  My examination through Telemedicine is very limited. There are many possibilities that need to be evaluated.   - I suggested to consult general neurology for detail evaluation, localization and management.   - PT/OT  - Thiamine     3) Dysarthria. Still persistent but stable. Could be related to not having his dentures. No facial weakness or hx of aspiration.     Stroke Team will sign off as I do not think his current ongoing symptoms are due to a vascular event. I suggest consulting General Neurology. Please call me back with any questions or concerns.     Thank you.    Lynn Haro  Vascular Neurology    ____________________________________________________________________        Medications:      Current Facility-Administered Medications:      - MEDICATION INSTRUCTIONS for Dialysis Patients -, , Does not apply, See Admin Instructions, Clyde  Jovi Quiñones MD     abacavir (ZIAGEN) tablet 600 mg, 600 mg, Oral, QPM, Jovi Tang MD, 600 mg at 09/02/18 2026     acetaminophen (TYLENOL) tablet 650 mg, 650 mg, Oral, Q4H PRN, Jovi Tang MD     albuterol (PROAIR HFA/PROVENTIL HFA/VENTOLIN HFA) 108 (90 Base) MCG/ACT inhaler 2 puff, 2 puff, Inhalation, Q6H PRN, Jovi Tang MD     aspirin tablet 325 mg, 325 mg, Oral, Daily, Jovi Tang MD, 325 mg at 09/03/18 0823     atorvastatin (LIPITOR) tablet 40 mg, 40 mg, Oral, At Bedtime, Jovi Tang MD, 40 mg at 09/02/18 2026     carvedilol (COREG) tablet 12.5 mg, 12.5 mg, Oral, BID w/meals, Jovi Tang MD, 12.5 mg at 09/03/18 0823     clonazePAM (klonoPIN) ODT tab 0.5 mg, 0.5 mg, Oral, At Bedtime PRN, Jovi Tang MD     clopidogrel (PLAVIX) tablet 75 mg, 75 mg, Oral, QPM, Jovi Tang MD, 75 mg at 09/02/18 2026     dapsone tablet 100 mg, 100 mg, Oral, At Bedtime, Jovi Tang MD, 100 mg at 09/02/18 2026     darunavir (PREZISTA) tablet 800 mg, 800 mg, Oral, At Bedtime, Jovi Tang MD, 800 mg at 09/02/18 2029     glucose gel 15-30 g, 15-30 g, Oral, Q15 Min PRN **OR** dextrose 50 % injection 25-50 mL, 25-50 mL, Intravenous, Q15 Min PRN **OR** glucagon injection 1 mg, 1 mg, Subcutaneous, Q15 Min PRN, Jovi Tang MD     dolutegravir (TIVICAY) tablet 50 mg, 50 mg, Oral, At Bedtime, Jovi Tang MD, 50 mg at 09/02/18 2026     gabapentin (NEURONTIN) capsule 300 mg, 300 mg, Oral, BID, Her, MD Jose De Jesus, 300 mg at 09/03/18 0823     hydrALAZINE (APRESOLINE) tablet 25 mg, 25 mg, Oral, TID, Jovi Tang MD, 25 mg at 09/03/18 0823     HYDROmorphone (DILAUDID) tablet 2 mg, 2 mg, Oral, Q3H PRN, Jovi Tang MD, 2 mg at 09/01/18 1741     insulin aspart (NovoLOG) inj (RAPID ACTING), 15 Units, Subcutaneous, TID AC, Jovi Tang MD, Stopped at 09/03/18 0833     insulin aspart  (NovoLOG) inj (RAPID ACTING), 1-7 Units, Subcutaneous, TID AC, Jovi Tang MD, 2 Units at 09/02/18 1840     insulin aspart (NovoLOG) inj (RAPID ACTING), 1-5 Units, Subcutaneous, At Bedtime, Jovi Tang MD, Stopped at 09/02/18 2211     insulin glargine (LANTUS) injection 20 Units, 20 Units, Subcutaneous, BID, Jose De Jesus Orozco MD, Stopped at 09/02/18 2211     ipratropium - albuterol 0.5 mg/2.5 mg/3 mL (DUONEB) neb solution 3 mL, 1 vial, Nebulization, Q6H PRN, Jovi Tang MD     irbesartan (AVAPRO) tablet 300 mg, 300 mg, Oral, At Bedtime, Jovi Tang MD, 300 mg at 09/02/18 2024     isosorbide mononitrate (IMDUR) 24 hr tablet 120 mg, 120 mg, Oral, QPM, Jovi Tang MD, 120 mg at 09/02/18 2026     lidocaine (LMX4) kit, , Topical, Q1H PRN, David Sotomayor MD     lidocaine 1 % 1 mL, 1 mL, Other, Q1H PRN, David Sotomayor MD     melatonin tablet 1 mg, 1 mg, Oral, At Bedtime PRN, Jovi Tang MD     mirtazapine (REMERON) tablet 15 mg, 15 mg, Oral, At Bedtime, Jose De Jesus Orozco MD, 15 mg at 09/02/18 2027     naloxone (NARCAN) injection 0.1-0.4 mg, 0.1-0.4 mg, Intravenous, Q2 Min PRN, Jovi Tang MD     nitroGLYcerin (NITROSTAT) sublingual tablet 0.4 mg, 0.4 mg, Sublingual, Q5 Min PRN, Jovi Tang MD     omeprazole (priLOSEC) CR capsule 40 mg, 40 mg, Oral, Daily, Jovi Tang MD, 40 mg at 09/03/18 0823     ondansetron (ZOFRAN-ODT) ODT tab 4 mg, 4 mg, Oral, Q6H PRN **OR** ondansetron (ZOFRAN) injection 4 mg, 4 mg, Intravenous, Q6H PRN, Jovi Tang MD     ritonavir (NORVIR) tablet 100 mg, 100 mg, Oral, At Bedtime, Jovi Tang MD, 100 mg at 09/02/18 2024     sodium chloride (PF) 0.9% PF flush 3 mL, 3 mL, Intracatheter, Q1H PRN, David Sotomayor MD     sucroferric oxyhydroxide (VELPHORO) chewable tablet 500 mg, 500 mg, Oral, TID w/meals, Jovi Tang MD     trolamine salicylate (ASPERCREME) 10 % cream, ,  "Topical, 4x Daily PRN, Nelson Worthy MD      /68 (BP Location: Right arm)  Pulse 60  Temp 96.5  F (35.8  C) (Axillary)  Resp 16  Ht 1.803 m (5' 11\")  Wt 87.5 kg (193 lb)  SpO2 95%  BMI 26.92 kg/m2  General: Awake and alert, not in any acute distress, cooperative    [employer] Stroke Scale     Score    Level of consciousness: (0)   Alert, keenly responsive    LOC questions: (0)   Answers both questions correctly    LOC commands: (0)   Performs both tasks correctly    Best gaze: (0)   Normal    Visual: (0)   No visual loss    Facial palsy: (0)   Normal symmetrical movements    Motor arm (left): (0)   No drift    Motor arm (right): (0)   No drift    Motor leg (left): (0)   No drift    Motor leg (right): (0)   No drift    Limb ataxia: (2)   Present in two limbs    Sensory: (0)   Normal- no sensory loss    Best language: (0)   Normal- no aphasia    Dysarthria: (1)   Mild to moderate dysarthria    Extinction and inattention: (0)   No abnormality        Total Score:  3   Dysarthria  Asterixes in all 4 limbs  Dysmetria mainly in legs. Superimposed on Asterixes (difficult to bring them apart)  Labs/Studies:  CBC:     Recent Labs  Lab 09/03/18  0848 09/02/18  1228 09/01/18  1147   WBC 6.0 4.7 6.1   RBC 2.87* 2.79* 3.14*   HGB 8.6* 8.3* 9.4*   HCT 27.7* 27.3* 30.4*   * 106* 123*     Basic Metabolic Panel:   Recent Labs   Lab Test  09/03/18   0848  09/02/18   0629  09/01/18   1146   NA  130*  131*  133   POTASSIUM  5.5*  5.1  4.2   CHLORIDE  92*  95  96   CO2  22  25  26   BUN  73*  53*  34*   CR  9.77*  7.69*  5.38*   GLC  112*  82  171*   PRABHA  8.4*  8.8  9.3     Liver panel:  Recent Labs   Lab Test  09/01/18   1146  07/12/18   1530  03/13/18   0609  03/10/18   0730  03/09/18   0709  03/08/18   0542   PROTTOTAL  8.0  8.1   --   6.7*  6.6*  7.3   ALBUMIN  4.1  4.0  3.3*  3.0*  3.1*  3.5   BILITOTAL  0.5  0.6   --   0.4  0.6  0.5   ALKPHOS  146  127   --   89  104  125   AST  19  12 "   --   17  16  18   ALT  19  22   --   17  15  16     INR:  Recent Labs   Lab Test  11/21/17   1405  10/10/17   2225  09/20/17   1211  09/19/17   1309  04/15/17   0819   INR  0.97  0.95  1.01  0.94  1.09      Lipid Profile:  Recent Labs   Lab Test  09/02/18   0629  08/09/17   0818  06/05/17   1138  01/13/17   0608  01/11/17   0446 09/19/16 02/01/16   0725   07/23/12   0712   CHOL  127  125  120   --    --   126  167   < >  298*   HDL  26*  33*  33*   --    --   21*  44   < >  37*   LDL  Cannot estimate LDL when triglyceride exceeds 400 mg/dL  48  30  12   --    --   31  47   < >  Cannot estimate LDL when triglyceride exceeds 400 mg/dL   TRIG  447*  311*  373*  1297*  473*  587*  382*   < >  1541*   CHOLHDLRATIO   --    --    --    --    --    --    --    --   8.1*    < > = values in this interval not displayed.     A1C:   Recent Labs   Lab Test  09/03/18   0848  09/02/18   0629  03/08/18   0542  02/01/18   1130  01/19/18   2026   A1C  Canceled, Test credited  Canceled, Test credited  Canceled, Test credited  Canceled, Test credited  Canceled, Test credited     Troponin I:   Recent Labs   Lab Test  09/01/18   1146  07/12/18   1530  03/09/18   1016  03/09/18   0709  03/09/18   0023  03/08/18   1134  03/08/18   0941  03/08/18   0542   TROPI  <0.015  <0.015  0.065*  0.074*  0.058*  0.052*  0.045  0.043         Imaging:  Relevant findings as per the Impression above.

## 2018-09-03 NOTE — PROGRESS NOTES
X-cover    Notified that BS low at 43.  Pt on insulin.  Instructed nurse to hold evening dose of Lantus (receiving 20 units bid)

## 2018-09-04 ENCOUNTER — APPOINTMENT (OUTPATIENT)
Dept: CT IMAGING | Facility: CLINIC | Age: 68
DRG: 312 | End: 2018-09-04
Attending: INTERNAL MEDICINE
Payer: MEDICARE

## 2018-09-04 ENCOUNTER — APPOINTMENT (OUTPATIENT)
Dept: SPEECH THERAPY | Facility: CLINIC | Age: 68
DRG: 312 | End: 2018-09-04
Payer: MEDICARE

## 2018-09-04 PROBLEM — Z71.89 ACP (ADVANCE CARE PLANNING): Chronic | Status: RESOLVED | Noted: 2018-02-02 | Resolved: 2018-09-04

## 2018-09-04 LAB
ANION GAP SERPL CALCULATED.3IONS-SCNC: 13 MMOL/L (ref 3–14)
ANION GAP SERPL CALCULATED.3IONS-SCNC: 8 MMOL/L (ref 3–14)
BASOPHILS # BLD AUTO: 0 10E9/L (ref 0–0.2)
BASOPHILS NFR BLD AUTO: 0.4 %
BUN SERPL-MCNC: 20 MG/DL (ref 7–30)
BUN SERPL-MCNC: 48 MG/DL (ref 7–30)
CALCIUM SERPL-MCNC: 8.6 MG/DL (ref 8.5–10.1)
CALCIUM SERPL-MCNC: 9.4 MG/DL (ref 8.5–10.1)
CHLORIDE SERPL-SCNC: 92 MMOL/L (ref 94–109)
CHLORIDE SERPL-SCNC: 98 MMOL/L (ref 94–109)
CO2 SERPL-SCNC: 26 MMOL/L (ref 20–32)
CO2 SERPL-SCNC: 27 MMOL/L (ref 20–32)
CREAT SERPL-MCNC: 4.07 MG/DL (ref 0.66–1.25)
CREAT SERPL-MCNC: 7.09 MG/DL (ref 0.66–1.25)
DIFFERENTIAL METHOD BLD: ABNORMAL
EOSINOPHIL # BLD AUTO: 0.1 10E9/L (ref 0–0.7)
EOSINOPHIL NFR BLD AUTO: 0.8 %
ERYTHROCYTE [DISTWIDTH] IN BLOOD BY AUTOMATED COUNT: 15.8 % (ref 10–15)
GFR SERPL CREATININE-BSD FRML MDRD: 15 ML/MIN/1.7M2
GFR SERPL CREATININE-BSD FRML MDRD: 8 ML/MIN/1.7M2
GLUCOSE BLDC GLUCOMTR-MCNC: 118 MG/DL (ref 70–99)
GLUCOSE BLDC GLUCOMTR-MCNC: 126 MG/DL (ref 70–99)
GLUCOSE BLDC GLUCOMTR-MCNC: 155 MG/DL (ref 70–99)
GLUCOSE BLDC GLUCOMTR-MCNC: 167 MG/DL (ref 70–99)
GLUCOSE BLDC GLUCOMTR-MCNC: 212 MG/DL (ref 70–99)
GLUCOSE BLDC GLUCOMTR-MCNC: 67 MG/DL (ref 70–99)
GLUCOSE BLDC GLUCOMTR-MCNC: 76 MG/DL (ref 70–99)
GLUCOSE BLDC GLUCOMTR-MCNC: 77 MG/DL (ref 70–99)
GLUCOSE BLDC GLUCOMTR-MCNC: 81 MG/DL (ref 70–99)
GLUCOSE BLDC GLUCOMTR-MCNC: 92 MG/DL (ref 70–99)
GLUCOSE BLDC GLUCOMTR-MCNC: 97 MG/DL (ref 70–99)
GLUCOSE SERPL-MCNC: 76 MG/DL (ref 70–99)
GLUCOSE SERPL-MCNC: 96 MG/DL (ref 70–99)
HCT VFR BLD AUTO: 26.2 % (ref 40–53)
HGB BLD-MCNC: 8.5 G/DL (ref 13.3–17.7)
IMM GRANULOCYTES # BLD: 0 10E9/L (ref 0–0.4)
IMM GRANULOCYTES NFR BLD: 0.4 %
INTERPRETATION ECG - MUSE: NORMAL
LACTATE BLD-SCNC: 1.7 MMOL/L (ref 0.7–2)
LYMPHOCYTES # BLD AUTO: 1.1 10E9/L (ref 0.8–5.3)
LYMPHOCYTES NFR BLD AUTO: 14.1 %
MCH RBC QN AUTO: 29.9 PG (ref 26.5–33)
MCHC RBC AUTO-ENTMCNC: 32.4 G/DL (ref 31.5–36.5)
MCV RBC AUTO: 92 FL (ref 78–100)
MONOCYTES # BLD AUTO: 0.7 10E9/L (ref 0–1.3)
MONOCYTES NFR BLD AUTO: 9.6 %
NEUTROPHILS # BLD AUTO: 5.6 10E9/L (ref 1.6–8.3)
NEUTROPHILS NFR BLD AUTO: 74.7 %
NRBC # BLD AUTO: 0 10*3/UL
NRBC BLD AUTO-RTO: 0 /100
PLATELET # BLD AUTO: 103 10E9/L (ref 150–450)
POTASSIUM SERPL-SCNC: 4.3 MMOL/L (ref 3.4–5.3)
POTASSIUM SERPL-SCNC: 5.5 MMOL/L (ref 3.4–5.3)
RBC # BLD AUTO: 2.84 10E12/L (ref 4.4–5.9)
SODIUM SERPL-SCNC: 131 MMOL/L (ref 133–144)
SODIUM SERPL-SCNC: 133 MMOL/L (ref 133–144)
TROPONIN I SERPL-MCNC: <0.015 UG/L (ref 0–0.04)
WBC # BLD AUTO: 7.4 10E9/L (ref 4–11)

## 2018-09-04 PROCEDURE — 25800025 ZZH RX 258: Performed by: INTERNAL MEDICINE

## 2018-09-04 PROCEDURE — 25000132 ZZH RX MED GY IP 250 OP 250 PS 637: Mod: GY | Performed by: INTERNAL MEDICINE

## 2018-09-04 PROCEDURE — 25000132 ZZH RX MED GY IP 250 OP 250 PS 637: Mod: GY | Performed by: PSYCHIATRY & NEUROLOGY

## 2018-09-04 PROCEDURE — A9270 NON-COVERED ITEM OR SERVICE: HCPCS | Mod: GY | Performed by: INTERNAL MEDICINE

## 2018-09-04 PROCEDURE — 25000128 H RX IP 250 OP 636: Performed by: INTERNAL MEDICINE

## 2018-09-04 PROCEDURE — 93005 ELECTROCARDIOGRAM TRACING: CPT

## 2018-09-04 PROCEDURE — 70450 CT HEAD/BRAIN W/O DYE: CPT

## 2018-09-04 PROCEDURE — 84484 ASSAY OF TROPONIN QUANT: CPT | Performed by: INTERNAL MEDICINE

## 2018-09-04 PROCEDURE — 12000007 ZZH R&B INTERMEDIATE

## 2018-09-04 PROCEDURE — 92610 EVALUATE SWALLOWING FUNCTION: CPT | Mod: GN | Performed by: SPEECH-LANGUAGE PATHOLOGIST

## 2018-09-04 PROCEDURE — 92507 TX SP LANG VOICE COMM INDIV: CPT | Mod: GN | Performed by: SPEECH-LANGUAGE PATHOLOGIST

## 2018-09-04 PROCEDURE — 83605 ASSAY OF LACTIC ACID: CPT | Performed by: INTERNAL MEDICINE

## 2018-09-04 PROCEDURE — 40000275 ZZH STATISTIC RCP TIME EA 10 MIN

## 2018-09-04 PROCEDURE — 93010 ELECTROCARDIOGRAM REPORT: CPT | Performed by: INTERNAL MEDICINE

## 2018-09-04 PROCEDURE — 36415 COLL VENOUS BLD VENIPUNCTURE: CPT | Performed by: INTERNAL MEDICINE

## 2018-09-04 PROCEDURE — 40000225 ZZH STATISTIC SLP WARD VISIT: Performed by: SPEECH-LANGUAGE PATHOLOGIST

## 2018-09-04 PROCEDURE — 90937 HEMODIALYSIS REPEATED EVAL: CPT

## 2018-09-04 PROCEDURE — 00000146 ZZHCL STATISTIC GLUCOSE BY METER IP

## 2018-09-04 PROCEDURE — 80048 BASIC METABOLIC PNL TOTAL CA: CPT | Performed by: INTERNAL MEDICINE

## 2018-09-04 PROCEDURE — 99233 SBSQ HOSP IP/OBS HIGH 50: CPT | Performed by: INTERNAL MEDICINE

## 2018-09-04 PROCEDURE — 70496 CT ANGIOGRAPHY HEAD: CPT

## 2018-09-04 PROCEDURE — 85025 COMPLETE CBC W/AUTO DIFF WBC: CPT | Performed by: INTERNAL MEDICINE

## 2018-09-04 PROCEDURE — 70496 CT ANGIOGRAPHY HEAD: CPT | Mod: XS

## 2018-09-04 PROCEDURE — 95816 EEG AWAKE AND DROWSY: CPT

## 2018-09-04 RX ORDER — HYDRALAZINE HYDROCHLORIDE 25 MG/1
25 TABLET, FILM COATED ORAL 3 TIMES DAILY
Status: DISCONTINUED | OUTPATIENT
Start: 2018-09-04 | End: 2018-09-08 | Stop reason: HOSPADM

## 2018-09-04 RX ORDER — IOPAMIDOL 755 MG/ML
500 INJECTION, SOLUTION INTRAVASCULAR ONCE
Status: COMPLETED | OUTPATIENT
Start: 2018-09-04 | End: 2018-09-04

## 2018-09-04 RX ADMIN — OMEPRAZOLE 40 MG: 20 CAPSULE, DELAYED RELEASE ORAL at 14:03

## 2018-09-04 RX ADMIN — IOPAMIDOL 120 ML: 755 INJECTION, SOLUTION INTRAVENOUS at 16:26

## 2018-09-04 RX ADMIN — DEXTROSE MONOHYDRATE 25 ML: 25 INJECTION, SOLUTION INTRAVENOUS at 03:59

## 2018-09-04 RX ADMIN — DAPSONE 100 MG: 100 TABLET ORAL at 21:52

## 2018-09-04 RX ADMIN — RITONAVIR 100 MG: 100 TABLET, FILM COATED ORAL at 21:51

## 2018-09-04 RX ADMIN — SODIUM CHLORIDE 250 ML: 9 INJECTION, SOLUTION INTRAVENOUS at 15:38

## 2018-09-04 RX ADMIN — TAZOBACTAM SODIUM AND PIPERACILLIN SODIUM 2.25 G: 250; 2 INJECTION, SOLUTION INTRAVENOUS at 14:19

## 2018-09-04 RX ADMIN — MIRTAZAPINE 15 MG: 15 TABLET, FILM COATED ORAL at 21:51

## 2018-09-04 RX ADMIN — Medication 100 MG: at 20:52

## 2018-09-04 RX ADMIN — ASPIRIN 325 MG ORAL TABLET 325 MG: 325 PILL ORAL at 14:03

## 2018-09-04 RX ADMIN — TROLAMINE SALICYLATE: 10 CREAM TOPICAL at 01:58

## 2018-09-04 RX ADMIN — HYDRALAZINE HYDROCHLORIDE 10 MG: 20 INJECTION INTRAMUSCULAR; INTRAVENOUS at 04:15

## 2018-09-04 RX ADMIN — Medication 100 MG: at 14:03

## 2018-09-04 RX ADMIN — GABAPENTIN 300 MG: 300 CAPSULE ORAL at 14:04

## 2018-09-04 RX ADMIN — GABAPENTIN 300 MG: 300 CAPSULE ORAL at 20:52

## 2018-09-04 RX ADMIN — INSULIN ASPART 2 UNITS: 100 INJECTION, SOLUTION INTRAVENOUS; SUBCUTANEOUS at 17:47

## 2018-09-04 RX ADMIN — ATORVASTATIN CALCIUM 40 MG: 40 TABLET, FILM COATED ORAL at 21:52

## 2018-09-04 RX ADMIN — HYDRALAZINE HYDROCHLORIDE 25 MG: 25 TABLET ORAL at 20:51

## 2018-09-04 RX ADMIN — ISOSORBIDE MONONITRATE 120 MG: 60 TABLET, EXTENDED RELEASE ORAL at 20:52

## 2018-09-04 RX ADMIN — DOLUTEGRAVIR SODIUM 50 MG: 50 TABLET, FILM COATED ORAL at 21:51

## 2018-09-04 RX ADMIN — DARUNAVIR 800 MG: 800 TABLET, FILM COATED ORAL at 22:09

## 2018-09-04 RX ADMIN — SODIUM CHLORIDE 60 ML: 900 INJECTION, SOLUTION INTRAVENOUS at 16:27

## 2018-09-04 RX ADMIN — IRBESARTAN 300 MG: 150 TABLET ORAL at 21:52

## 2018-09-04 RX ADMIN — CLOPIDOGREL 75 MG: 75 TABLET, FILM COATED ORAL at 20:52

## 2018-09-04 RX ADMIN — CARVEDILOL 12.5 MG: 12.5 TABLET, FILM COATED ORAL at 20:52

## 2018-09-04 RX ADMIN — CARVEDILOL 12.5 MG: 12.5 TABLET, FILM COATED ORAL at 14:03

## 2018-09-04 RX ADMIN — TAZOBACTAM SODIUM AND PIPERACILLIN SODIUM 2.25 G: 250; 2 INJECTION, SOLUTION INTRAVENOUS at 20:56

## 2018-09-04 RX ADMIN — HYDRALAZINE HYDROCHLORIDE 25 MG: 25 TABLET ORAL at 14:03

## 2018-09-04 RX ADMIN — ABACAVIR 600 MG: 300 TABLET, FILM COATED ORAL at 21:51

## 2018-09-04 ASSESSMENT — ACTIVITIES OF DAILY LIVING (ADL)
ADLS_ACUITY_SCORE: 13
ADLS_ACUITY_SCORE: 13
ADLS_ACUITY_SCORE: 14
ADLS_ACUITY_SCORE: 14
ADLS_ACUITY_SCORE: 13
ADLS_ACUITY_SCORE: 13

## 2018-09-04 NOTE — PLAN OF CARE
Problem: Patient Care Overview  Goal: Plan of Care/Patient Progress Review  Outcome: No Change  .ICU End of Shift Summary.  For vital signs and complete assessments, please see documentation flowsheets.     Pertinent assessments: New transfer to ICU at 2000 last evening.  Patient is lethargic upon arrival; arouses to voice and was able to answer some questions but then drifts back off to sleep.  Not awake enough to safely administer oral medications.  Speech garbled; hard to understand.  Moving all extremities.  Denies pain.  Patient is on 3 L of Oxymask.  Tele SR.  LS coarse.  BS active.  One small stool overnight.  Nitroglycerin drip not started as patient's BP somewhat improved with dialysis and SBP mainly in 140s.  PRN Hydralazine x 1 per tele hub.  Will continue to monitor BP.    Major Shift Events: Emergent dialysis last night.  BG dropped to 67; 1/2 amp of D50 given with good results.  Recheck came back at 126.    Plan (Upcoming Events): Plan for dialysis again today.    Discharge/Transfer Needs: TBD    Bedside Shift Report Completed :   Bedside Safety Check Completed:

## 2018-09-04 NOTE — PROGRESS NOTES
Potassium   Date Value Ref Range Status   09/03/2018 7.1 (HH) 3.4 - 5.3 mmol/L Final     Comment:     Critical Value called to and read back by  AUGIE GALVEZ MS3 ON 9.3.18 AT 1821 BY RS       Lab Results   Component Value Date    HGB 8.6 09/03/2018     Weight: 89 kg (196 lb 3.4 oz)  POST WT  DIALYSIS PROCEDURE NOTE  Hepatitis status of previous patient on machine log was checked and verified ok to use with this patients hepatitis status.  Patient dialyzed for 2.5 hrs. on a 1 K bath for 1 hour, K2 for final 1.5 hours with a net fluid removal of  3L.  A BFR of 450 ml/min was obtained via a LUE AVF using 15gauge needles.    The patient was seen by Dr. Reed during the treatment.  Total heparin received during the treatment: 0 units. Sites held x 15 min then  pressure drsgs applied.  Meds  Given: Epogen, Hectorol. Complications: None.  Procedure and ESRD teaching done and questions answered. See flowsheet in EPIC for further details and post assessment.  Machine water alarm in place and functioning.  Patient dialyzed emergently in ICU  Vascular Access: Aseptic prep done for both on/off.  Report received from: ADITYA Rodríguez RN  Report given to: ADITYA Rodríguez RN  HEPATITIS B SURFACE ANTIGEN Non-Reactive DATE 1/20/18   HEPATITIS B SURFACE ANTIBODY Immune DATE 1/20/18  Chlorine/Chloramine water system checked every 4 hours.  Outpatient Dialysis at [facility]

## 2018-09-04 NOTE — PROGRESS NOTES
Respiratory Therapy Note        EKG done and given to physician.    September 4, 2018 4:11 PM  Chidi Hernandez

## 2018-09-04 NOTE — PLAN OF CARE
Problem: Patient Care Overview  Goal: Plan of Care/Patient Progress Review  OT session attempted. Pt currently working with SLP as well as receiving dialysis.

## 2018-09-04 NOTE — PROGRESS NOTES
1209; Report given to Sania SILVERMAN.  Dialysis completed.  VSS.  AV Fistula dressing CDI.  Transferring patient cart with belongings.

## 2018-09-04 NOTE — PLAN OF CARE
Problem: Patient Care Overview  Goal: Plan of Care/Patient Progress Review    PT: Attempted to see patient for physical therapy session x2; patient in dialysis, then EEG, then confused in room (attempted calling 911 for assistance) with soft BPs per nursing.  Will reschedule physical therapy session for tomorrow.

## 2018-09-04 NOTE — PLAN OF CARE
Problem: Patient Care Overview  Goal: Plan of Care/Patient Progress Review  Discharge Planner SLP   Patient plan for discharge: Did not state  Current status: Swallow: Pt presents with functional swallow on clinical swallow evaluation. Oral motor exam WFL. Pt is edentulous and reported dentures at home. Adequate oral phase and no overt s/sx of aspiration with thin liquids by cup and and regular texture crackers. Pt denied dysphagia symptoms and verbalized agreement with recommended: regular textures (mod carb/renal diet), thin liquids. Upright for all intake with small bites and sips. No further SLP services indicated for swallowing.   Speech/language: Speech intelligibility noted to be 95% in complex conversation today which is likely effected by accent and SLP being an unfamiliar communication partner. Pt was able to demonstrate understanding of strategies to improve speech intelligibility. Pt reported new difficulty finding words. Pt did not recall yesterday events. MD present during the session and discussed likely effects of dialysis/pressures and possible seizures effecting pt's speech. Pt is able to communicate all wants and needs and interact socially with slight difficulty. Will hold further SLP services pending further medical/neurological work up for cause of waxing and waning dysarthria/word finding difficulty. Per LSW note, ARU recommended. Pt would be best served at the next level of care for full speech/language/cognitive evaluation. ST will sign off.   Barriers to return to prior living situation: None from SLP  Recommendations for discharge: Per PT/OT  Rationale for recommendations: No further SLP needs identified at this level of care. Will sign off.        Entered by: Daisy Josue 09/04/2018 10:42 AM

## 2018-09-04 NOTE — PLAN OF CARE
Problem: Patient Care Overview  Goal: Plan of Care/Patient Progress Review  Outcome: Improving  Transfer from ICU at approximately 1345, pt had EEG done before arriving on unit. Orientation to room and safety checks completed. Pt VSS, denies pain. o2 sats at 95% on RA. LS clear, fine crackles in LLL. A/O x4. Lift. Normally SBA w/cane. HD. Dialysis done this AM. Shunt left arm. AM meds held until arrival to unit. IV zosyn. Makes minimal urine. Home meds in lockbox in room. BG 97. Tele shows SR w/PVCs. Will continue with current POC. Possible discharge TBD.

## 2018-09-04 NOTE — PROGRESS NOTES
09/04/18 1053   General Information   Onset Date 09/01/18   Start of Care Date 09/02/18   Referring Physician Lucita Downing MD   Patient Profile Review/OT: Additional Occupational Profile Info See Profile for full history and prior level of function   Patient/Family Goals Statement to eat/drink   Swallowing Evaluation Bedside swallow evaluation   Behaviorial Observations WFL (within functional limits)   Mode of current nutrition NPO   Respiratory Status Room air   Comments SLP: Swallow orders placed for possible change in neuro status. Work up pending. Per MD note:   Mr. Raza is a 68-year-old man with a very complex past medical history who began feeling unwell while on hemodialysis today.  He developed diffuse abdominal pains and had to stop his run early.  He said he thought he made about 2 hours of a 4-hour run.  He also was having nausea and was eventually directed to the Emergency Department.  The patient reports he has been constipated and not able to have a bowel movement for the past 3-4 days.  In the Emergency Department, the ER physician noticed that he began having some garbled speech.  Also, having difficulty finding words.  The patient's brother is also in the room and said that this also happened a couple of other times this week and resolved spontaneously.  In addition, the patient has been having worsening balance problems recently.  He has not fallen but said he has had a couple episodes where thought he was going to pass out.  His speech is now back to normal.  His abdominal pain is also better.  He also developed a severe headache shortly after dialysis, which has persisted.  He also feels very weak and tired.    Clinical Swallow Evaluation   Oral Musculature generally intact   Structural Abnormalities none present   Dentition edentulous, does not have dentures  (dentures at home)   Mucosal Quality good   Mandibular Strength and Mobility intact   Oral Labial Strength and Mobility WFL    Lingual Strength and Mobility WFL   Velar Elevation intact   Buccal Strength and Mobility intact   Laryngeal Function Swallow;Voicing initiated   Oral Musculature Comments WFL   Esophageal Phase of Swallow   Patient reports or presents with symptoms of esophageal dysphagia No   Swallow Eval: Clinical Impressions   Skilled Criteria for Therapy Intervention No problems identified which require skilled intervention   Functional Assessment Scale (FAS) 7   Diet texture recommendations Regular diet;Thin liquids   Anticipated Discharge Disposition inpatient rehabilitation facility   Risks and Benefits of Treatment have been explained. Yes   Patient, family and/or staff in agreement with Plan of Care Yes   Clinical Impression Comments Swallow: Pt presents with functional swallow on clinical swallow evaluation. Oral motor exam WFL. Pt is edentulous and reported dentures at home. Adequate oral phase and no overt s/sx of aspiration with thin liquids by cup and and regular texture crackers. Pt denied dysphagia symptoms and verbalized agreement with recommended: regular textures (mod carb/renal diet), thin liquids. Upright for all intake with small bites and sips. No further SLP services indicated for swallowing.    Total Evaluation Time   Total Evaluation Time (Minutes) 18

## 2018-09-04 NOTE — PROGRESS NOTES
[facility]  Hospitalist Progress Note    Date of Service (when I saw the patient): 09/04/2018    Assessment & Plan     68-year-old man who presents with abdominal pain while on dialysis, headache, episode of slurred speech, episodes of lightheadedness and near-syncope, and constipation.  He has a history of end-stage renal disease on hemodialysis, diabetes mellitus type 2, hypertension, HIV, coronary artery disease, diastolic heart failure and chronic anemia. There was concern for possible CVA/TIA on admission. Patient was seen by neurology. He had acute and rapid onset of hypoxia on evening of 9/3, His K was found to be 7.1, and new infiltrate on CXR. He was transferred to ICU and received dialysis with dramatic improvement in condition.     # Acute hypoxic respiratory failure. This happened on evening of 9/3, this has improved dramatically after couple of sessions of dialysis, suspect component of volume overload, pneumonia. A question of seizure was brought up as pt was shivering prior to this episode, along with some lethargy afterwards, however, on speaking to family who were present at bedside the description does not sound typical for seizure, and could be related to the marked hypoxia.   -- EEG today  -- Respiratory status much better and now weaned to room air , transfer out of ICU     # Possible RUL pneumonia. Continue IV Piperacillin/Tazobactam      # Episode of slurred speech.  There is concern for CVA. Certainly is at risk as he has several risk factors for vascular disease.  According to his brother, he has had a couple of episodes where he is having word finding difficulties and slurred speech and then it resolved spontaneously.  He underwent imaging with CTA and MRI brain. He has had several echocardiograms and had a bubble study in 01/2017, which did not show any shunt.    -- Discussed case with stroke neurology, On his CTA there is maybe a short area of occlusion in a small branch of  MCA. His MRI brain was read as having no acute stroke, although d/w neurology and there maybe a punctate area of stroke in frontal lobe.   -- Continue asa 325 mg daily, given a Clopidogrel load of 300 mg, and do Plavix 75 mg daily x 3 weeks  -- Tele, cardiac monitor on discharge  -- Statin   -- PT/OT  -- It is also possible that he is having some transient hypotension, especially on dialysis which may contribute to these episodes of speech difficulties as well as his episodes of near-syncope.     #  Ataxia, gait changes. Possible neuropathy. Could also be related to his overall medical issues, will see how he does with PT after treatment with ABx and dialysis     #  Abdominal pain. Most likely related to constipation, resolved now after BM     #  Near-syncope.  He says he has had a few of these over the past week.  He has had less intense episodes in the past, but he feels that these are worsening.    -- We request physical therapy evaluation to assess his walking and monitor for recurrence.     #  Hypertension/CAD.  He is on several antihypertensive medications, resume baseline home medication, he has accelerated HTN, with systolic upto 200 at times, although has had high BP in past as well, We will just resume him on the baseline medications for now and adjust based on trend, last cath was in march 2018, which showed patent stents, aggressive medical management was advised   -- BP is better now after dialysis, will adjust based on trend if remains hypertensive     #  IDDM. With several episodes of hypoglycemia, will stop all insulin for now and only use sliding scale insulin. Resume Lantus once oral intake improves   #  Human immunodeficiency virus.  We will reconcile his antiviral medications and resume.   #  End-stage renal disease.  Nephrology following to manage dialysis   #  Possible cystitis on CT.  He is currently not having any symptoms associated with this.  UA negative. fup as outpatient         DVT  Prophylaxis: Pneumatic Compression Devices    Code Status: Full Code    Disposition: Expected discharge in 2-3 days     [facility]    Interval History   Chart reviewed and patient seen. Case discussed with nursing staff.     Patient feels much better after dialysis session this morning, awake, alert, now on room air, discussed with family at bedside, no furthr chest pain or SOB, No abdominal pain. Speech is better, no clear cut seizure activity, No other focal weakness or numbness, No other complaints voiced.     -Data reviewed today: I reviewed all new labs and imaging results over the last 24 hours. I personally reviewed .  # Pain Assessment:  Current Pain Score 9/4/2018   Patient currently in pain? denies   Pain score (0-10) -   Pain location -   Pain descriptors -   CPOT pain score -        Physical Exam   Temp: 98.1  F (36.7  C) Temp src: Oral BP: 147/78 Pulse: 75 Heart Rate: 74 Resp: 18 SpO2: 95 % O2 Device: None (Room air) Oxygen Delivery: 3 LPM  Vitals:    09/03/18 2010 09/04/18 0200 09/04/18 0758   Weight: 89 kg (196 lb 3.4 oz) 85.2 kg (187 lb 13.3 oz) 85.2 kg (187 lb 13.3 oz)     Vital Signs with Ranges  Temp:  [98.1  F (36.7  C)-100.2  F (37.9  C)] 98.1  F (36.7  C)  Pulse:  [75] 75  Heart Rate:  [51-86] 74  Resp:  [12-32] 18  BP: ()/(51-92) 147/78  SpO2:  [84 %-100 %] 95 %  I/O last 3 completed shifts:  In: 0   Out: 3000 [Other:3000]    GENERAL:  Awake and alert, No acute distress.  PSYCH: appropriate affect, no acute agitation   HEENT:  Neck is Supple, trachea is midline, EOMI, conjunctiva clear  CARDIOVASCULAR: Regular rate and rhythm, Normal S1, S2, no loud murmurs  PULMONARY:  Clear to auscultation , no wheezing   GI: Abdomen is soft, non tender, non-distended, bowel sounds present. No rebound or guarding   SKIN:  No cyanosis or clubbing  MSK: Extremities are warm and well perfused. No pitting edema   Neuro: Awake and oriented x 3. Moving all extremities with good strength, mild  dysmetria with both arms, possible subtle left facial droop looks improved      Medications     nitroGLYcerin Stopped (09/03/18 2000)     - MEDICATION INSTRUCTIONS -       - MEDICATION INSTRUCTIONS -         - MEDICATION INSTRUCTIONS for Dialysis Patients -   Does not apply See Admin Instructions     abacavir  600 mg Oral QPM     aspirin  325 mg Oral Daily     atorvastatin  40 mg Oral At Bedtime     carvedilol  12.5 mg Oral BID w/meals     clopidogrel (PLAVIX) tablet 75 mg  75 mg Oral QPM     dapsone  100 mg Oral At Bedtime     darunavir  800 mg Oral At Bedtime     dolutegravir  50 mg Oral At Bedtime     gabapentin  300 mg Oral BID     hydrALAZINE  25 mg Oral TID     insulin aspart  1-7 Units Subcutaneous TID AC     insulin aspart  1-5 Units Subcutaneous At Bedtime     irbesartan  300 mg Oral At Bedtime     isosorbide mononitrate  120 mg Oral QPM     mirtazapine  15 mg Oral At Bedtime     omeprazole  40 mg Oral Daily     piperacillin-tazobactam  2.25 g Intravenous Q8H JAN     ritonavir  100 mg Oral At Bedtime     sodium bicarbonate  25 mEq Intravenous Once     sodium polystyrene  30 g Oral Once     sucroferric oxyhydroxide  500 mg Oral TID w/meals     thiamine  100 mg Oral 3 times daily       Data   All new lab data and imaging results from today have been reviewed       Recent Labs  Lab 09/04/18  0531 09/03/18  2137 09/03/18  1750 09/03/18  0848 09/02/18  1228  09/01/18  1146   WBC 7.4  --   --  6.0 4.7  < >  --    HGB 8.5*  --   --  8.6* 8.3*  < >  --    MCV 92  --   --  97 98  < >  --    *  --   --  122* 106*  < >  --    *  --  126* 130*  --   < > 133   POTASSIUM 5.5* 4.0 7.1* 5.5*  --   < > 4.2   CHLORIDE 92*  --  91* 92*  --   < > 96   CO2 26  --  22 22  --   < > 26   BUN 48*  --  77* 73*  --   < > 34*   CR 7.09*  --  10.10* 9.77*  --   < > 5.38*   ANIONGAP 13  --  13 16*  --   < > 11   PRABHA 8.6  --  8.6 8.4*  --   < > 9.3   GLC 76  --  112* 112*  --   < > 171*   ALBUMIN  --   --   --   --   --   --   4.1   PROTTOTAL  --   --   --   --   --   --  8.0   BILITOTAL  --   --   --   --   --   --  0.5   ALKPHOS  --   --   --   --   --   --  146   ALT  --   --   --   --   --   --  19   AST  --   --   --   --   --   --  19   LIPASE  --   --   --   --   --   --  205   TROPI  --   --   --   --   --   --  <0.015   < > = values in this interval not displayed.    Recent Results (from the past 24 hour(s))   XR Chest Port 1 View    Narrative    XR CHEST PORT 1 VW  9/3/2018 6:02 PM     HISTORY:  ESRD patient, recently only partial run of HD.  now  hypertensive and hypoxic.  recent CVA symptoms.  ?infiltrate vs edema  vs other;     COMPARISON: Film dated 7/12/2018    FINDINGS:  There is a new focal infiltrate in the right upper lobe.  This would be consistent with pneumonia. The left lung appears clear.  Cardiac silhouette is mildly enlarged.      Impression    IMPRESSION: New right upper lobe infiltrate compatible with pneumonia.      MEET MIMS MD

## 2018-09-04 NOTE — PROGRESS NOTES
X-Cover    Notified about pt having episode of confusion, on my interview, pt has soft /52, normal oxygenation, vitals stable otherwise. he does not feel very well although cannot specify why, he has slurring of speech, otherwise neuro exam is without any changes. Remembers me and knows that he is in hospital. I had seen him recently about an hour ago when he was completely awake, alert, and conversing with family.     Exact etiology of this episode remains unclear, may be hypoperfusion after dialysis, encephalopathy? Dialysis dysequilibrium? TIA/CVA also remains in differential. He has no chest pain and EKG does not show any evidence for ischemia. Code stroke was called and patient taken for CT head. Lactic acid, troponin pending. Discussed with on-call neuro. If CT head is negative, then proceed to MRI Brain. In the meanwhile, he was given 250 ml fluid bolus and starting to feel a little better.     Lucita Downing MD  9/4/2018    4:34 PM    UPDATE:  CT head and perfusion study is negative. Patient re-evaluated and doing much better, ordering dinner on phone. SBP up to 130's, will proceed with MR brain w/o contrast, although suspect the decrease in BP in the setting of pneumonia as probably etiology. Lactic acid and troponin pending.

## 2018-09-04 NOTE — PROGRESS NOTES
[facility]     Renal Progress Note       SHORTHAND KEY FOR MY NOTES:  c = with, s = without, p = after, a = before, x = except, asx = asymptomatic, tx = transplant or treatment, sx = symptoms or symptomatic, cx = canceled or culture, rxn = reaction, yday = yesterday, nl = normal, abx = antibiotics, fxn = function, dx = diagnosis, dz = disease, m/h = melena/hematochezia, c/d/l/ha = cramping/dizziness/lightheadedness/headache, d/c = discharge or diarrhea/constipation, f/c/n/v = fevers/chills/nausea/vomiting, cp/sob = chest pain/shortness of breath.         Assessment/Plan:     1.  ESKD.  Pt's k was elevated again this AM despite 3.5h of HD last night.  He is also pulling another 2.5L of fluid today.  He has been above his EDW, but that isn't the primary issue right now.  A.  HD today per TRS schedule.  B.  May benefit from 4d/wk schedule.  Will d/w pt, primary neph (Dr. Chavez).    2.  Hyperkalemia.  The pt's k was elevated again today despite HD last night.  Part of it may have been due to shifts from the med mgmt.  He isn't eating excessively high k foods and his access is seemingly working ok.  There is a possibility of recirc, so if it persists, we will get an u/s of the fistula.  Of note, the transient lactic acidosis could have contributed last night.  A.  HD today c k2.  B.  Follow k - check again at 3 pm.    3.  Neuro issues - CVA vs TIA vs sz.  He has no recollection of the event yday.  Combined c the transient lactic acidosis, and fairly quick recovery, wonder if he had a sz.    A.  Consider EEG.  D/w Dr. Downing.    4.  HTN.  Controlled.  On carvedilol, irbesartan, iso mono.  We are adjusting dry wt first, and then adjust meds thereafter.  A.  Goal BP > 120.  B.  We will adjust meds accordingly.  First step if BP drops - decrease irbesartan.    5.  FEN.  NPO for now, until he passes swallow eval, which he will.    A.  When officially cleared, he should be on a renal diet (lower k than dialysis  diet.)        Interval History:     Pt feels better this AM.  No cp/sob.  He had constipation, but it's better.      He was transferred to the ICU yday p another episode of garbled speech.  Per the notes, he seemed to recover fairly quickly.  Pt has no recollection of the event.  His k was high, lactic acid elevated and he had urgent HD last night.  3L removed and he is breathing fine.          Medications and Allergies:       - MEDICATION INSTRUCTIONS for Dialysis Patients -   Does not apply See Admin Instructions     sodium chloride 0.9%  250 mL Intravenous Once in dialysis     sodium chloride 0.9%  250 mL Intravenous Once in dialysis     abacavir  600 mg Oral QPM     aspirin  325 mg Oral Daily     atorvastatin  40 mg Oral At Bedtime     carvedilol  12.5 mg Oral BID w/meals     clopidogrel (PLAVIX) tablet 75 mg  75 mg Oral QPM     dapsone  100 mg Oral At Bedtime     darunavir  800 mg Oral At Bedtime     dolutegravir  50 mg Oral At Bedtime     gabapentin  300 mg Oral BID     hydrALAZINE  25 mg Oral TID     insulin aspart  1-7 Units Subcutaneous TID AC     insulin aspart  1-5 Units Subcutaneous At Bedtime     irbesartan  300 mg Oral At Bedtime     isosorbide mononitrate  120 mg Oral QPM     mirtazapine  15 mg Oral At Bedtime     omeprazole  40 mg Oral Daily     piperacillin-tazobactam  2.25 g Intravenous Q8H JAN     ritonavir  100 mg Oral At Bedtime     sodium bicarbonate  25 mEq Intravenous Once     sodium polystyrene  30 g Oral Once     sucroferric oxyhydroxide  500 mg Oral TID w/meals     thiamine  100 mg Oral 3 times daily      Allergies   Allergen Reactions     Calcium Acetate Other (See Comments)     Other reaction(s): Other, see comments  Pain in chest and back  Pain in chest area (sensitive skin)      Diagnostic X-Ray Materials Other (See Comments)     PN: renal failure     Lisinopril      Sulfa Drugs           Physical Exam:     Vitals were reviewed    Heart Rate: 68, Blood pressure 130/70, pulse 60,  temperature 99.5  F (37.5  C), temperature source Oral, resp. rate 25, height 1.829 m (6'), weight 85.2 kg (187 lb 13.3 oz), SpO2 95 %.  Wt Readings from Last 3 Encounters:   09/04/18 85.2 kg (187 lb 13.3 oz)   07/14/18 86.6 kg (190 lb 14.7 oz)   04/11/18 85.7 kg (189 lb)     Intake/Output Summary (Last 24 hours) at 09/04/18 0945  Last data filed at 09/03/18 2300   Gross per 24 hour   Intake                0 ml   Output             3000 ml   Net            -3000 ml     GENERAL APPEARANCE: pleasant, NAD, alert, interactive, appropriate  HEENT:  Eyes/ears/nose/neck grossly normal  RESP: lungs c crackles - R > L  CV: RRR c 2/6 m, nl S1/S2   ABDOMEN: o/s/nt/nd  EXTREMITIES/SKIN: no significant ble edema  OTHER:  LUAF - aneurysmal    Pt seen on HD.  Stable run so far.  -2.5L UF goal.  BFR ok via LUAF.         Data:     CBC RESULTS:     Recent Labs  Lab 09/04/18  0531 09/03/18  0848 09/02/18  1228 09/01/18  1147   WBC 7.4 6.0 4.7 6.1   RBC 2.84* 2.87* 2.79* 3.14*   HGB 8.5* 8.6* 8.3* 9.4*   HCT 26.2* 27.7* 27.3* 30.4*   * 122* 106* 123*     Basic Metabolic Panel:    Recent Labs  Lab 09/04/18  0531 09/03/18  2137 09/03/18  1750 09/03/18  0848 09/02/18  0629 09/01/18  1146   *  --  126* 130* 131* 133   POTASSIUM 5.5* 4.0 7.1* 5.5* 5.1 4.2   CHLORIDE 92*  --  91* 92* 95 96   CO2 26  --  22 22 25 26   BUN 48*  --  77* 73* 53* 34*   CR 7.09*  --  10.10* 9.77* 7.69* 5.38*   GLC 76  --  112* 112* 82 171*   PRABHA 8.6  --  8.6 8.4* 8.8 9.3     INRNo lab results found in last 7 days.   Attestation:   I have reviewed today's relevant vital signs, notes, medications, labs and imaging.    Bipin Chaves MD  [facility] - Nephrology  [phone]

## 2018-09-04 NOTE — PLAN OF CARE
Problem: Patient Care Overview  Goal: Plan of Care/Patient Progress Review  Outcome: No Change  Pt family at bedside including daughter Pura. Drgt reported pt seemed more SOB than previous and he felt really warm, VS /73. Temp 99.7. R 32, HR 53, O2 84 RA. Rt was paged for a neb with no improvement. Pt was placed on oxy mask 10L satting at 89-90%.  MD was paged due to decrease in resp status. Primary MD returned called and said to page MD on call. MD was paged and responded to bedside. IV hydralazine given due to elevated BP.  BG  126. MD ordered labs and tx to ICU to possible place on bipap. Critical potassium came back at 7.1. MD paged. Lactic back at 2.1. EKG ordered SB with peaked T-waves. Sodium bicarb 25 meq given x1. Dextrose 50% 25g given x1 IV along with Regular Insulin 5 units IV x1. IV lasix 40 mg given. LS coarse crackles. HR dips to 48-49 at times but remains in 50's.     Pt does not produce urine. Hemodialysis 3/week with next run tomorrow (Tues). Neph, PT, OT, Speech, Tele stroke Neuro consulted. PRN nebs, Right arm fistula bruit and thrill present, A&O with times of lethargy during resp distress episode.  BG checked q30 x4 per Insulin IV protocol. , 172, and 134. NPO diet, Ax1 with gb and walker on previous shift. Pt became more alert right before tx to ICU including laughing and smiling with staff, He reports he feels slightly better and his breathing has improved.     Daughter Pura was updated with ICU tx via phone. Nursing report was given to ICU nurse along with home meds brought in and personal belongings. BP at tx was 148/55, HR 53, O2 97% 10L oxymask.

## 2018-09-04 NOTE — PROGRESS NOTES
Dialysis Run:  Previous Hep B status of Patient on machine verified by me   Pt dialyzed at bedside in ICU  Time out taken  Consent signed Pt unable with 1st run  Pt ran for 3.5 hours on a K2 with a net fluid removal of 2.5L  A BF of 450 was maintained via LAVF which was cannulated with 15 gauge needles without difficulty  Meds :None  No Heparin given during the run  Complications:None  Pt was seen by Dr Chaves during run  Aseptic prep both on and off procedure.  Hemostasis of needles sites achieved after 10 minutes  Procedure and ESRD discussed and all questions answered  VSS post run See EPIC for more details  Water detection monitor on floor during run  Pt dialyzes at [facility]  Blood Volume Processed 88L  Pre Weight   85.2 kg    Post Weight   82.7      Potassium   Date Value Ref Range Status   09/04/2018 5.5 (H) 3.4 - 5.3 mmol/L Final     Comment:     Specimen slightly hemolyzed, potassium may be falsely elevated   ]  Hemoglobin   Date Value Ref Range Status   09/04/2018 8.5 (L) 13.3 - 17.7 g/dL Final   ]  Creatinine   Date Value Ref Range Status   09/04/2018 7.09 (H) 0.66 - 1.25 mg/dL Final   ]      Emerita Ariza RN [facility]

## 2018-09-05 ENCOUNTER — APPOINTMENT (OUTPATIENT)
Dept: OCCUPATIONAL THERAPY | Facility: CLINIC | Age: 68
DRG: 312 | End: 2018-09-05
Payer: MEDICARE

## 2018-09-05 ENCOUNTER — APPOINTMENT (OUTPATIENT)
Dept: MRI IMAGING | Facility: CLINIC | Age: 68
DRG: 312 | End: 2018-09-05
Attending: INTERNAL MEDICINE
Payer: MEDICARE

## 2018-09-05 ENCOUNTER — APPOINTMENT (OUTPATIENT)
Dept: CARDIOLOGY | Facility: CLINIC | Age: 68
DRG: 312 | End: 2018-09-05
Attending: INTERNAL MEDICINE
Payer: MEDICARE

## 2018-09-05 LAB
ANION GAP SERPL CALCULATED.3IONS-SCNC: 9 MMOL/L (ref 3–14)
BUN SERPL-MCNC: 35 MG/DL (ref 7–30)
CALCIUM SERPL-MCNC: 9.5 MG/DL (ref 8.5–10.1)
CHLORIDE SERPL-SCNC: 95 MMOL/L (ref 94–109)
CO2 SERPL-SCNC: 28 MMOL/L (ref 20–32)
CREAT SERPL-MCNC: 6.17 MG/DL (ref 0.66–1.25)
ERYTHROCYTE [DISTWIDTH] IN BLOOD BY AUTOMATED COUNT: 16 % (ref 10–15)
GFR SERPL CREATININE-BSD FRML MDRD: 9 ML/MIN/1.7M2
GLUCOSE BLDC GLUCOMTR-MCNC: 129 MG/DL (ref 70–99)
GLUCOSE BLDC GLUCOMTR-MCNC: 135 MG/DL (ref 70–99)
GLUCOSE BLDC GLUCOMTR-MCNC: 152 MG/DL (ref 70–99)
GLUCOSE BLDC GLUCOMTR-MCNC: 154 MG/DL (ref 70–99)
GLUCOSE BLDC GLUCOMTR-MCNC: 202 MG/DL (ref 70–99)
GLUCOSE SERPL-MCNC: 127 MG/DL (ref 70–99)
HCT VFR BLD AUTO: 27.5 % (ref 40–53)
HGB BLD-MCNC: 8.4 G/DL (ref 13.3–17.7)
INTERPRETATION ECG - MUSE: NORMAL
MCH RBC QN AUTO: 29.7 PG (ref 26.5–33)
MCHC RBC AUTO-ENTMCNC: 30.5 G/DL (ref 31.5–36.5)
MCV RBC AUTO: 97 FL (ref 78–100)
PLATELET # BLD AUTO: 111 10E9/L (ref 150–450)
POTASSIUM SERPL-SCNC: 4.4 MMOL/L (ref 3.4–5.3)
RBC # BLD AUTO: 2.83 10E12/L (ref 4.4–5.9)
SODIUM SERPL-SCNC: 132 MMOL/L (ref 133–144)
WBC # BLD AUTO: 4.9 10E9/L (ref 4–11)

## 2018-09-05 PROCEDURE — 40000133 ZZH STATISTIC OT WARD VISIT

## 2018-09-05 PROCEDURE — 25000132 ZZH RX MED GY IP 250 OP 250 PS 637: Mod: GY | Performed by: PSYCHIATRY & NEUROLOGY

## 2018-09-05 PROCEDURE — 25000132 ZZH RX MED GY IP 250 OP 250 PS 637: Mod: GY | Performed by: INTERNAL MEDICINE

## 2018-09-05 PROCEDURE — 93306 TTE W/DOPPLER COMPLETE: CPT

## 2018-09-05 PROCEDURE — A9270 NON-COVERED ITEM OR SERVICE: HCPCS | Mod: GY | Performed by: INTERNAL MEDICINE

## 2018-09-05 PROCEDURE — 85027 COMPLETE CBC AUTOMATED: CPT | Performed by: INTERNAL MEDICINE

## 2018-09-05 PROCEDURE — 99233 SBSQ HOSP IP/OBS HIGH 50: CPT | Performed by: INTERNAL MEDICINE

## 2018-09-05 PROCEDURE — 36415 COLL VENOUS BLD VENIPUNCTURE: CPT | Performed by: INTERNAL MEDICINE

## 2018-09-05 PROCEDURE — 00000146 ZZHCL STATISTIC GLUCOSE BY METER IP

## 2018-09-05 PROCEDURE — 97535 SELF CARE MNGMENT TRAINING: CPT | Mod: GO

## 2018-09-05 PROCEDURE — 93306 TTE W/DOPPLER COMPLETE: CPT | Mod: 26 | Performed by: INTERNAL MEDICINE

## 2018-09-05 PROCEDURE — 70551 MRI BRAIN STEM W/O DYE: CPT

## 2018-09-05 PROCEDURE — 25000128 H RX IP 250 OP 636: Performed by: INTERNAL MEDICINE

## 2018-09-05 PROCEDURE — 12000007 ZZH R&B INTERMEDIATE

## 2018-09-05 PROCEDURE — 80048 BASIC METABOLIC PNL TOTAL CA: CPT | Performed by: INTERNAL MEDICINE

## 2018-09-05 RX ADMIN — ACETAMINOPHEN 325 MG: 325 TABLET, FILM COATED ORAL at 23:47

## 2018-09-05 RX ADMIN — Medication 100 MG: at 01:01

## 2018-09-05 RX ADMIN — TAZOBACTAM SODIUM AND PIPERACILLIN SODIUM 2.25 G: 250; 2 INJECTION, SOLUTION INTRAVENOUS at 20:34

## 2018-09-05 RX ADMIN — CLOPIDOGREL 75 MG: 75 TABLET, FILM COATED ORAL at 19:02

## 2018-09-05 RX ADMIN — Medication 100 MG: at 09:15

## 2018-09-05 RX ADMIN — HYDRALAZINE HYDROCHLORIDE 25 MG: 25 TABLET ORAL at 17:07

## 2018-09-05 RX ADMIN — ASPIRIN 325 MG ORAL TABLET 325 MG: 325 PILL ORAL at 12:53

## 2018-09-05 RX ADMIN — Medication 100 MG: at 23:45

## 2018-09-05 RX ADMIN — INSULIN ASPART 1 UNITS: 100 INJECTION, SOLUTION INTRAVENOUS; SUBCUTANEOUS at 12:51

## 2018-09-05 RX ADMIN — DOLUTEGRAVIR SODIUM 50 MG: 50 TABLET, FILM COATED ORAL at 23:01

## 2018-09-05 RX ADMIN — DARUNAVIR 800 MG: 800 TABLET, FILM COATED ORAL at 23:04

## 2018-09-05 RX ADMIN — GABAPENTIN 300 MG: 300 CAPSULE ORAL at 19:01

## 2018-09-05 RX ADMIN — TAZOBACTAM SODIUM AND PIPERACILLIN SODIUM 2.25 G: 250; 2 INJECTION, SOLUTION INTRAVENOUS at 12:09

## 2018-09-05 RX ADMIN — GABAPENTIN 300 MG: 300 CAPSULE ORAL at 09:15

## 2018-09-05 RX ADMIN — INSULIN ASPART 2 UNITS: 100 INJECTION, SOLUTION INTRAVENOUS; SUBCUTANEOUS at 18:55

## 2018-09-05 RX ADMIN — Medication 100 MG: at 19:03

## 2018-09-05 RX ADMIN — RITONAVIR 100 MG: 100 TABLET, FILM COATED ORAL at 23:01

## 2018-09-05 RX ADMIN — OMEPRAZOLE 40 MG: 20 CAPSULE, DELAYED RELEASE ORAL at 09:14

## 2018-09-05 RX ADMIN — CARVEDILOL 12.5 MG: 12.5 TABLET, FILM COATED ORAL at 09:14

## 2018-09-05 RX ADMIN — IRBESARTAN 300 MG: 150 TABLET ORAL at 23:01

## 2018-09-05 RX ADMIN — HYDRALAZINE HYDROCHLORIDE 25 MG: 25 TABLET ORAL at 23:03

## 2018-09-05 RX ADMIN — DAPSONE 100 MG: 100 TABLET ORAL at 23:05

## 2018-09-05 RX ADMIN — CARVEDILOL 12.5 MG: 12.5 TABLET, FILM COATED ORAL at 19:02

## 2018-09-05 RX ADMIN — ATORVASTATIN CALCIUM 40 MG: 40 TABLET, FILM COATED ORAL at 19:02

## 2018-09-05 RX ADMIN — TAZOBACTAM SODIUM AND PIPERACILLIN SODIUM 2.25 G: 250; 2 INJECTION, SOLUTION INTRAVENOUS at 01:01

## 2018-09-05 RX ADMIN — MIRTAZAPINE 15 MG: 15 TABLET, FILM COATED ORAL at 23:01

## 2018-09-05 RX ADMIN — HYDRALAZINE HYDROCHLORIDE 25 MG: 25 TABLET ORAL at 09:15

## 2018-09-05 RX ADMIN — ABACAVIR 600 MG: 300 TABLET, FILM COATED ORAL at 19:02

## 2018-09-05 RX ADMIN — ISOSORBIDE MONONITRATE 120 MG: 60 TABLET, EXTENDED RELEASE ORAL at 19:02

## 2018-09-05 ASSESSMENT — ACTIVITIES OF DAILY LIVING (ADL)
ADLS_ACUITY_SCORE: 14
ADLS_ACUITY_SCORE: 14
ADLS_ACUITY_SCORE: 13
ADLS_ACUITY_SCORE: 14
ADLS_ACUITY_SCORE: 13
ADLS_ACUITY_SCORE: 13

## 2018-09-05 ASSESSMENT — PAIN DESCRIPTION - DESCRIPTORS: DESCRIPTORS: BURNING

## 2018-09-05 NOTE — PLAN OF CARE
Problem: Patient Care Overview  Goal: Plan of Care/Patient Progress Review  PT: Attempted to see pt 2x this afternoon, both times pt soundly sleeping and not awaking to name. Family in room states pt has been very tired this afternoon and not wanting to get up. Tx cancelled.

## 2018-09-05 NOTE — PLAN OF CARE
Problem: Patient Care Overview  Goal: Plan of Care/Patient Progress Review  Outcome: No Change  A&OX4,VSS.FRANCO, on room air. Denies SOB/CP.Tele- SR. On renal diet, . Up with assist of 2 with lift. On IV Zosyn. AV Fistula on left arm CDI. Plan for MRI today.

## 2018-09-05 NOTE — PLAN OF CARE
Problem: Patient Care Overview  Goal: Plan of Care/Patient Progress Review  Discharge Planner OT   Patient plan for discharge: Open to rehab options  Current status: mod I for sup>sit with use of bedrail. Min A to readjust socks. Min A for sit>stand with heavy reliance on FWW.  Min A to ambulate to bathroom. Completed 3 g/h in stance, 1 LOB noted while leaning down to throw away a paper towel, self-corrected.  Pt ambulated bathroom>chair Min A with FWW, 1 LOB noted, self-corrected, vc's needed to increase step length and stay inside of walker as pt continues to have heavy reliance through UE.  Pt CGA for stand>sit.  C/o of lightheadedness. BP O2 stable throughout, 152/80 prior to activity and 159/76 following activity.  Barriers to return to prior living situation: Stairs, current level of assist for ADLs/transfers, impaired balance, decreased strength/functional activity tolerance, impaired BUE coordination  Recommendations for discharge: ARU  Rationale for recommendations: Pt is significantly below baseline level of functioning with ADLs and transfers and would benefit from skilled OT to maximize safety and indep in these areas through improved strength, functional activity tolerance, balance, coordination and safety. Pt motivated to participate.        Entered by: Saray Prado 09/05/2018 12:00 PM

## 2018-09-05 NOTE — PROCEDURES
Procedure Date: 2018      ELECTROENCEPHALOGRAM      EEG:  #.      SERVICE DATE:  2018       TIME:  2:31 p.m.      CONDITION OF PATIENT:  Noted to be awake and alert.      REASON FOR TEST:  Evaluate for seizure, episodes of slurred speech, word finding difficulties, near-syncope.  This happened a few times over the past week.      HISTORY:  End-stage renal disease on hemodialysis, type 2 diabetes, coronary artery disease, pulmonary hypertension, accelerated hypertension, diastolic CHF, and HIV on antiretroviral therapy.      MEDICATIONS:  Coreg, aspirin, Neurontin, hydralazine, insulin, Prilosec, Zosyn, Velphoro, thiamin, Norvir, Remeron, Avapro, DuoNeb, and Lasix.      IMPRESSION:  This is a grossly symmetric recording.  There is a posterior dominant rhythm of approximately 8 Hz, but this is not consistent or particularly well formed.  There is frequent generalized slowing in the theta to delta range at times as well as 2-5 Hz.  The slowing is at times semi-rhythmic but not particularly sharply contoured or indicative or suggestive of epileptiform discharges.  The recording is done as an awake recording only.  Photic stimulation was done without any associated abnormalities.  No hyperventilation was done.      INTERPRETATION:  This is an abnormal EEG due to the presence of diffuse slowing at times in the delta frequency.  There are no focal findings or epileptiform discharges.  This recording would be most consistent with an underlying encephalopathy.  Please note that absence of epileptiform discharges or electrographic seizures does not rule out an underlying seizure disorder and further clinical correlation is recommended.         SUE EGAN MD             D: 2018   T: 2018   MT: CAILIN      Name:     GREGORIO BRUSH   MRN:      [id number]        Account:        [id number]   :      1950           Procedure Date: 2018      Document: [id number]

## 2018-09-05 NOTE — PROGRESS NOTES
[facility]  Hospitalist Progress Note    Date of Service (when I saw the patient): 09/05/2018    Assessment & Plan     68-year-old man who presents with abdominal pain while on dialysis, headache, episode of slurred speech, episodes of lightheadedness and near-syncope, and constipation.  He has a history of end-stage renal disease on hemodialysis, diabetes mellitus type 2, hypertension, HIV, coronary artery disease, diastolic heart failure and chronic anemia. There was concern for possible CVA/TIA on admission. Patient was seen by neurology. He had acute and rapid onset of hypoxia on evening of 9/3, His K was found to be 7.1, and new infiltrate on CXR. He was transferred to ICU and received dialysis with dramatic improvement in condition. Had another episode of slurring speech on 9/4, which resolved spontaneously. Repeat CT, CTA and MRI was negative.      # Acute hypoxic respiratory failure. This happened on evening of 9/3, this has improved dramatically after couple of sessions of dialysis, suspect component of volume overload, pneumonia. A question of seizure was brought up as pt was shivering prior to this episode, along with some lethargy afterwards, however, on speaking to family who were present at bedside the description does not sound typical for seizure, and could be related to the marked hypoxia.   -- EEG was negative   -- Respiratory status much better and now weaned to room air , transfered out of ICU     # RUL pneumonia. Possible aspiration. Continue IV Piperacillin/Tazobactam      # Episode of slurred speech.  There is concern for CVA. Certainly is at risk as he has several risk factors for vascular disease.  According to his brother, he has had a couple of episodes where he is having word finding difficulties and slurred speech and then it resolved spontaneously.  He has had several echocardiograms and had a bubble study in 01/2017, which did not show any shunt.    -- Discussed case with  stroke neurology, On his original CTA there is maybe a short area of occlusion in a small branch of MCA. His MRI brain was read as having no acute stroke, although d/w neurology and there maybe a punctate area of stroke in frontal lobe. The repeat CTA is negative   -- Continue asa 325 mg daily, given a Clopidogrel load of 300 mg, and do Plavix 75 mg daily x 3 weeks  -- Tele, cardiac monitor on discharge  -- Statin   -- PT/OT  -- It is also possible that he is having some transient hypotension, especially related to dialysis which may contribute to these episodes of speech difficulties as well as his episodes of near-syncope. Question of dialysis dysequilibrium has been brought up, defer to nephrology     #  Abdominal pain. Most likely related to constipation, resolved now after BM     #  Near-syncope.  He says he has had a few of these over the past week.  He has had less intense episodes in the past, but he feels that these are worsening.   -- We request physical therapy evaluation to assess his walking and monitor for recurrence.   -- Monitor BP especially on dialysis days to see if there is a corelation   -- I will check cortisol to eval for adrenal insufficieny     #  Hypertension/CAD.  He is on several antihypertensive medications, resume baseline home medication, he has accelerated HTN, with systolic upto 200 at times, although has had high BP in past as well, We will just resume him on the baseline medications for now and adjust based on trend, last cath was in march 2018, which showed patent stents, aggressive medical management was advised   -- BP is better now after dialysis, will adjust based on trend if remains hypertensive     #  IDDM. With several episodes of hypoglycemia, will stop all insulin for now and only use sliding scale insulin. Resume Lantus once oral intake improves   #  Human immunodeficiency virus.  We will reconcile his antiviral medications and resume.   #  End-stage renal disease.   Nephrology following to manage dialysis   #  Possible cystitis on CT.  He is currently not having any symptoms associated with this.  UA negative. fup as outpatient         DVT Prophylaxis: Pneumatic Compression Devices    Code Status: Full Code    Disposition: Expected discharge in 2 days to TCU vs ARU    Tauseef Ur Chang    Interval History   Chart reviewed and patient seen. Case discussed with nursing staff.     Patient feels well, recovered from episode last night, awake, alert, mild headache, no chest pain or SOB, No abdominal pain. Speech is better, No other focal weakness or numbness, No other complaints voiced.     -Data reviewed today: I reviewed all new labs and imaging results over the last 24 hours. I personally reviewed .  # Pain Assessment:  Current Pain Score 9/5/2018   Patient currently in pain? denies   Pain score (0-10) -   Pain location -   Pain descriptors -   CPOT pain score -        Physical Exam   Temp: 98  F (36.7  C) Temp src: Oral BP: 159/76 Pulse: 82 Heart Rate: 70 Resp: 18 SpO2: 97 % O2 Device: None (Room air)    Vitals:    09/03/18 2010 09/04/18 0200 09/04/18 0758   Weight: 89 kg (196 lb 3.4 oz) 85.2 kg (187 lb 13.3 oz) 85.2 kg (187 lb 13.3 oz)     Vital Signs with Ranges  Temp:  [98  F (36.7  C)-99  F (37.2  C)] 98  F (36.7  C)  Pulse:  [75-86] 82  Heart Rate:  [64-80] 70  Resp:  [18] 18  BP: (118-159)/(59-80) 159/76  SpO2:  [93 %-98 %] 97 %  I/O last 3 completed shifts:  In: -   Out: 2500 [Other:2500]    GENERAL:  Awake and alert, No acute distress.  PSYCH: appropriate affect, no acute agitation   HEENT:  Neck is Supple, trachea is midline, EOMI, conjunctiva clear  CARDIOVASCULAR: Regular rate and rhythm, Normal S1, S2, no loud murmurs  PULMONARY:  Clear to auscultation , no wheezing, occasional crackle on right side   GI: Abdomen is soft, non tender, non-distended, bowel sounds present. No rebound or guarding   SKIN:  No cyanosis or clubbing  MSK: Extremities are warm and well  perfused. No pitting edema   Neuro: Awake and oriented x 3. Moving all extremities with good strength, mild dysmetria on both sides, no facial droop noted      Medications     nitroGLYcerin Stopped (09/03/18 2000)     - MEDICATION INSTRUCTIONS -       - MEDICATION INSTRUCTIONS -         - MEDICATION INSTRUCTIONS for Dialysis Patients -   Does not apply See Admin Instructions     abacavir  600 mg Oral QPM     aspirin  325 mg Oral Daily     atorvastatin  40 mg Oral At Bedtime     carvedilol  12.5 mg Oral BID w/meals     clopidogrel (PLAVIX) tablet 75 mg  75 mg Oral QPM     dapsone  100 mg Oral At Bedtime     darunavir  800 mg Oral At Bedtime     dolutegravir  50 mg Oral At Bedtime     gabapentin  300 mg Oral BID     hydrALAZINE  25 mg Oral TID     insulin aspart  1-7 Units Subcutaneous TID AC     insulin aspart  1-5 Units Subcutaneous At Bedtime     irbesartan  300 mg Oral At Bedtime     isosorbide mononitrate  120 mg Oral QPM     mirtazapine  15 mg Oral At Bedtime     omeprazole  40 mg Oral Daily     piperacillin-tazobactam  2.25 g Intravenous Q8H JAN     ritonavir  100 mg Oral At Bedtime     sodium bicarbonate  25 mEq Intravenous Once     sodium polystyrene  30 g Oral Once     sucroferric oxyhydroxide  500 mg Oral TID w/meals     thiamine  100 mg Oral 3 times daily       Data   All new lab data and imaging results from today have been reviewed       Recent Labs  Lab 09/05/18  0653 09/04/18  1754 09/04/18  1447 09/04/18  0531  09/03/18  0848  09/01/18  1146   WBC 4.9  --   --  7.4  --  6.0  < >  --    HGB 8.4*  --   --  8.5*  --  8.6*  < >  --    MCV 97  --   --  92  --  97  < >  --    *  --   --  103*  --  122*  < >  --    *  --  133 131*  < > 130*  < > 133   POTASSIUM 4.4  --  4.3 5.5*  < > 5.5*  < > 4.2   CHLORIDE 95  --  98 92*  < > 92*  < > 96   CO2 28  --  27 26  < > 22  < > 26   BUN 35*  --  20 48*  < > 73*  < > 34*   CR 6.17*  --  4.07* 7.09*  < > 9.77*  < > 5.38*   ANIONGAP 9  --  8 13  < > 16*   < > 11   PRABHA 9.5  --  9.4 8.6  < > 8.4*  < > 9.3   *  --  96 76  < > 112*  < > 171*   ALBUMIN  --   --   --   --   --   --   --  4.1   PROTTOTAL  --   --   --   --   --   --   --  8.0   BILITOTAL  --   --   --   --   --   --   --  0.5   ALKPHOS  --   --   --   --   --   --   --  146   ALT  --   --   --   --   --   --   --  19   AST  --   --   --   --   --   --   --  19   LIPASE  --   --   --   --   --   --   --  205   TROPI  --  <0.015  --   --   --   --   --  <0.015   < > = values in this interval not displayed.    Recent Results (from the past 24 hour(s))   CT Head w/o Contrast    Narrative    CT OF THE HEAD WITHOUT CONTRAST 9/4/2018 4:34 PM     COMPARISON: Brain MR 9/1/2018    HISTORY: Dysarthria, recent possible frontal stroke.     TECHNIQUE: 5 mm thick axial CT images of the head were acquired  without IV contrast material.    FINDINGS: There is mild diffuse cerebral volume loss. There are  extensive confluent areas of decreased density in the cerebral white  matter bilaterally that are consistent with sequela of chronic small  vessel ischemic disease.    The ventricles and basal cisterns are within normal limits in  configuration given the degree of cerebral volume loss. There is no  midline shift. There are no extra-axial fluid collections.    No intracranial hemorrhage, mass or recent infarct.    The visualized paranasal sinuses are well-aerated. There is no  mastoiditis. There are no fractures of the visualized bones.      Impression    IMPRESSION: Diffuse cerebral volume loss and cerebral white matter  changes consistent with chronic small vessel ischemic disease. No  evidence for acute intracranial pathology. No change from the recent  comparison study.      Radiation dose for this scan was reduced using automated exposure  control, adjustment of the mA and/or kV according to patient size, or  iterative reconstruction technique.    MIKEY GOLDSTEIN MD   CTA Head Neck with Contrast    Narrative    CT  ANGIOGRAM OF THE HEAD AND NECK WITHOUT AND WITH CONTRAST  9/4/2018  4:43 PM     COMPARISON: Head and neck CT angiogram 9/1/2018.    HISTORY: Dysarthria.     TECHNIQUE:  Precontrast localizing scans were followed by CT  angiography with an injection of 70 mL Isovue-370 nonionic intravenous  contrast material with scans through the head and neck.  Images were  transferred to a separate 3-D workstation where multiplanar  reformations and 3-D images were created.  Estimates of carotid  stenoses are made relative to the distal internal carotid artery  diameters except as noted.      FINDINGS: Incidental note is made of airspace opacities in the right  upper lobe possibly representing pneumonia.    Neck CTA: The common carotid arteries bilaterally are patent without  stenosis. There is calcified atherosclerotic plaque at the origins of  the internal carotid arteries on both sides that results in mild  narrowing (less than 50% luminal diameter stenosis) bilaterally.  Cervical internal carotid arteries bilaterally are tortuous but are  otherwise patent with no additional areas of vascular narrowing. The  vertebral arteries bilaterally are patent without stenosis.    Head CTA: There is calcified atherosclerotic plaque of the  intracranial distal internal carotid arteries on both sides that  results in mild narrowing of the supraclinoid segment on the left but  no other significant narrowing. The basilar, bilateral anterior  cerebral, bilateral middle cerebral and bilateral posterior cerebral  arteries are patent and unremarkable. The anterior communicating and  right posterior communicating arteries are patent and unremarkable.      Impression    IMPRESSION:  1. Airspace opacities in the right upper lobe that could represent  pneumonia.  2. Calcified atherosclerotic plaque of the proximal and distal  internal carotid arteries on both sides that does not result in any  significant narrowing on either side.  3. Otherwise,  normal neck and head CTA. No change from the recent  comparison study.      Radiation dose for this scan was reduced using automated exposure  control, adjustment of the mA and/or kV according to patient size, or  iterative reconstruction technique    MIKEY GOLDSTEIN MD   CT Head Perfusion w Contrast    Narrative    CT BRAIN PERFUSION 9/4/2018 4:50 PM    COMPARISON: None    HISTORY: Code stroke.     TECHNIQUE: Time sequential axial CT images of the head were acquired  during the administration of intravenous contrast (120 mL Isovue-370).  CTA images of the [place] as well as color perfusion maps of  the brain were created from this time sequential axial source data.    FINDINGS: There are no focal or regional perfusion defects in the  brain.      Impression    IMPRESSION: Normal CT perfusion of the brain.      Radiation dose for this scan was reduced using automated exposure  control, adjustment of the mA and/or kV according to patient size, or  iterative reconstruction technique    MIKEY GOLDSTEIN MD   MR Brain w/o Contrast    Narrative    MRI BRAIN WITHOUT CONTRAST  9/5/2018 8:54 AM    HISTORY:  Episode of slurred speech.     TECHNIQUE:  Multiplanar, multisequence MRI of the brain without  gadolinium IV contrast material.      COMPARISON:  CT 5/4/2018. Brain MR 9/1/2018.    FINDINGS: There is no evidence of hemorrhage, mass, acute infarct, or  anomaly. There is generalized atrophy of the brain. White matter T2  hyperintensities are seen in the cerebral hemispheres consistent with  sequelae of small vessel ischemic disease. Ventricular size is within  normal limits without evidence of hydrocephalus.     The facial structures appear normal. The arteries at the base of the  brain and the dural venous sinuses appear patent.      Impression    IMPRESSION:    1. No evidence of acute infarct, mass, hemorrhage, or herniation.  2. There is generalized atrophy of the brain. White matter changes are  present in the  cerebral hemispheres that are consistent with small  vessel ischemic disease in this age patient.  No significant change  since previous MR 9/1/2018.     RACHEL KESSLER MD

## 2018-09-05 NOTE — PLAN OF CARE
Problem: Patient Care Overview  Goal: Plan of Care/Patient Progress Review  Outcome: No Change  VSS. Tele - SR. Alert and oriented x4, forget at times. Lung sounds diminished. Blood sugars 129 and 154. MRI this morning showed no evidence of acute infarct, mass, hemorrhage, or herniation. Continuing IV zosyn every 8 hours for pneumonia. Echo completed - EF 60-65%. Tolerating renal/diabetic diet. Up with 1 and walker in room. PT/OT following. Plan: discharge 2 days to TCU vs ARU.

## 2018-09-05 NOTE — PLAN OF CARE
Problem: Patient Care Overview  Goal: Plan of Care/Patient Progress Review  Outcome: No Change  VSS, /69, A/OX4, LS dim, 4 stools throughout shift, dark brown/ green and soft, assist of 1 with gate belt, code stroke called on pt at 1600 for decrease in LOC, went to CT stat, CT shows no signs of stoke, MRI scheduled for tomorrow morning, NSR on tele, creatinine-4.07, trops negative, lactic-1.7, DC 2-3 days.

## 2018-09-06 LAB
ALBUMIN SERPL-MCNC: 3.3 G/DL (ref 3.4–5)
ALP SERPL-CCNC: 124 U/L (ref 40–150)
ALT SERPL W P-5'-P-CCNC: 14 U/L (ref 0–70)
ANION GAP SERPL CALCULATED.3IONS-SCNC: 11 MMOL/L (ref 3–14)
AST SERPL W P-5'-P-CCNC: 16 U/L (ref 0–45)
BILIRUB SERPL-MCNC: 0.4 MG/DL (ref 0.2–1.3)
BUN SERPL-MCNC: 58 MG/DL (ref 7–30)
CALCIUM SERPL-MCNC: 8.4 MG/DL (ref 8.5–10.1)
CHLORIDE SERPL-SCNC: 95 MMOL/L (ref 94–109)
CO2 SERPL-SCNC: 26 MMOL/L (ref 20–32)
CORTIS SERPL-MCNC: 10.8 UG/DL (ref 4–22)
CREAT SERPL-MCNC: 8.14 MG/DL (ref 0.66–1.25)
ERYTHROCYTE [DISTWIDTH] IN BLOOD BY AUTOMATED COUNT: 15.8 % (ref 10–15)
GFR SERPL CREATININE-BSD FRML MDRD: 7 ML/MIN/1.7M2
GLUCOSE BLDC GLUCOMTR-MCNC: 100 MG/DL (ref 70–99)
GLUCOSE BLDC GLUCOMTR-MCNC: 148 MG/DL (ref 70–99)
GLUCOSE BLDC GLUCOMTR-MCNC: 159 MG/DL (ref 70–99)
GLUCOSE BLDC GLUCOMTR-MCNC: 186 MG/DL (ref 70–99)
GLUCOSE BLDC GLUCOMTR-MCNC: 225 MG/DL (ref 70–99)
GLUCOSE SERPL-MCNC: 144 MG/DL (ref 70–99)
HCT VFR BLD AUTO: 26.2 % (ref 40–53)
HGB BLD-MCNC: 8.1 G/DL (ref 13.3–17.7)
MCH RBC QN AUTO: 30 PG (ref 26.5–33)
MCHC RBC AUTO-ENTMCNC: 30.9 G/DL (ref 31.5–36.5)
MCV RBC AUTO: 97 FL (ref 78–100)
PLATELET # BLD AUTO: 121 10E9/L (ref 150–450)
POTASSIUM SERPL-SCNC: 4.7 MMOL/L (ref 3.4–5.3)
PROCALCITONIN SERPL-MCNC: 1.6 NG/ML
PROT SERPL-MCNC: 6.8 G/DL (ref 6.8–8.8)
RBC # BLD AUTO: 2.7 10E12/L (ref 4.4–5.9)
SODIUM SERPL-SCNC: 132 MMOL/L (ref 133–144)
WBC # BLD AUTO: 5.4 10E9/L (ref 4–11)

## 2018-09-06 PROCEDURE — 85027 COMPLETE CBC AUTOMATED: CPT | Performed by: INTERNAL MEDICINE

## 2018-09-06 PROCEDURE — 63400005 ZZH RX 634: Performed by: INTERNAL MEDICINE

## 2018-09-06 PROCEDURE — 25000132 ZZH RX MED GY IP 250 OP 250 PS 637: Mod: GY | Performed by: PSYCHIATRY & NEUROLOGY

## 2018-09-06 PROCEDURE — 00000146 ZZHCL STATISTIC GLUCOSE BY METER IP

## 2018-09-06 PROCEDURE — 99233 SBSQ HOSP IP/OBS HIGH 50: CPT | Performed by: INTERNAL MEDICINE

## 2018-09-06 PROCEDURE — 36415 COLL VENOUS BLD VENIPUNCTURE: CPT | Performed by: INTERNAL MEDICINE

## 2018-09-06 PROCEDURE — 80053 COMPREHEN METABOLIC PANEL: CPT | Performed by: INTERNAL MEDICINE

## 2018-09-06 PROCEDURE — 25000132 ZZH RX MED GY IP 250 OP 250 PS 637: Mod: GY | Performed by: INTERNAL MEDICINE

## 2018-09-06 PROCEDURE — 25000128 H RX IP 250 OP 636: Performed by: INTERNAL MEDICINE

## 2018-09-06 PROCEDURE — A9270 NON-COVERED ITEM OR SERVICE: HCPCS | Mod: GY | Performed by: INTERNAL MEDICINE

## 2018-09-06 PROCEDURE — 84145 PROCALCITONIN (PCT): CPT | Performed by: INTERNAL MEDICINE

## 2018-09-06 PROCEDURE — 82533 TOTAL CORTISOL: CPT | Performed by: INTERNAL MEDICINE

## 2018-09-06 PROCEDURE — 90937 HEMODIALYSIS REPEATED EVAL: CPT

## 2018-09-06 PROCEDURE — 12000007 ZZH R&B INTERMEDIATE

## 2018-09-06 RX ORDER — ALBUMIN, HUMAN INJ 5% 5 %
250 SOLUTION INTRAVENOUS
Status: DISCONTINUED | OUTPATIENT
Start: 2018-09-06 | End: 2018-09-06

## 2018-09-06 RX ORDER — ALBUMIN (HUMAN) 12.5 G/50ML
50 SOLUTION INTRAVENOUS
Status: DISCONTINUED | OUTPATIENT
Start: 2018-09-06 | End: 2018-09-06

## 2018-09-06 RX ADMIN — RITONAVIR 100 MG: 100 TABLET, FILM COATED ORAL at 21:38

## 2018-09-06 RX ADMIN — IRBESARTAN 300 MG: 150 TABLET ORAL at 21:39

## 2018-09-06 RX ADMIN — SODIUM CHLORIDE 250 ML: 9 INJECTION, SOLUTION INTRAVENOUS at 13:27

## 2018-09-06 RX ADMIN — MIRTAZAPINE 15 MG: 15 TABLET, FILM COATED ORAL at 21:38

## 2018-09-06 RX ADMIN — ABACAVIR 600 MG: 300 TABLET, FILM COATED ORAL at 21:37

## 2018-09-06 RX ADMIN — GABAPENTIN 300 MG: 300 CAPSULE ORAL at 21:38

## 2018-09-06 RX ADMIN — DARUNAVIR 800 MG: 800 TABLET, FILM COATED ORAL at 21:42

## 2018-09-06 RX ADMIN — TAZOBACTAM SODIUM AND PIPERACILLIN SODIUM 2.25 G: 250; 2 INJECTION, SOLUTION INTRAVENOUS at 03:56

## 2018-09-06 RX ADMIN — CARVEDILOL 12.5 MG: 12.5 TABLET, FILM COATED ORAL at 21:38

## 2018-09-06 RX ADMIN — HYDRALAZINE HYDROCHLORIDE 25 MG: 25 TABLET ORAL at 17:47

## 2018-09-06 RX ADMIN — TAZOBACTAM SODIUM AND PIPERACILLIN SODIUM 2.25 G: 250; 2 INJECTION, SOLUTION INTRAVENOUS at 21:45

## 2018-09-06 RX ADMIN — DAPSONE 100 MG: 100 TABLET ORAL at 21:38

## 2018-09-06 RX ADMIN — SODIUM CHLORIDE 300 ML: 9 INJECTION, SOLUTION INTRAVENOUS at 13:26

## 2018-09-06 RX ADMIN — ISOSORBIDE MONONITRATE 120 MG: 60 TABLET, EXTENDED RELEASE ORAL at 21:38

## 2018-09-06 RX ADMIN — ATORVASTATIN CALCIUM 40 MG: 40 TABLET, FILM COATED ORAL at 21:37

## 2018-09-06 RX ADMIN — DOLUTEGRAVIR SODIUM 50 MG: 50 TABLET, FILM COATED ORAL at 21:38

## 2018-09-06 RX ADMIN — EPOETIN ALFA 6000 UNITS: 3000 SOLUTION INTRAVENOUS; SUBCUTANEOUS at 14:26

## 2018-09-06 RX ADMIN — Medication 100 MG: at 21:39

## 2018-09-06 RX ADMIN — CLOPIDOGREL 75 MG: 75 TABLET, FILM COATED ORAL at 21:39

## 2018-09-06 RX ADMIN — HYDRALAZINE HYDROCHLORIDE 25 MG: 25 TABLET ORAL at 21:38

## 2018-09-06 ASSESSMENT — ACTIVITIES OF DAILY LIVING (ADL)
ADLS_ACUITY_SCORE: 13

## 2018-09-06 NOTE — PROGRESS NOTES
[facility]  Hospitalist Progress Note    Date of Service (when I saw the patient): 09/06/2018    Assessment & Plan     68-year-old man who presents with abdominal pain while on dialysis, headache, episode of slurred speech, episodes of lightheadedness and near-syncope, and constipation.  He has a history of end-stage renal disease on hemodialysis, diabetes mellitus type 2, hypertension, HIV, coronary artery disease, diastolic heart failure and chronic anemia. There was concern for possible CVA/TIA on admission. Patient was seen by neurology. He had acute and rapid onset of hypoxia on evening of 9/3, His K was found to be 7.1, and new infiltrate on CXR. He was transferred to ICU and received dialysis with dramatic improvement in condition. Had another episode of slurring speech on 9/4, which resolved spontaneously. Repeat CT, CTA and MRI was negative.      # Recurrent episodes of near-syncope, dizziness, slurred speech. No clear cut etiology has been found for these episodes so far. Concern for CVA, although I am not sure if that alone explains all of the symptoms. ECHO was done which was unchanged. Troponin negative.   --  It is also possible that he is having some transient hypotension, especially related to dialysis which may contribute to these episodes. Question of dialysis dysequilibrium has been brought up, and if he would benefit from shorter more frequent runs, defer to nephrology   -- Thought about adrenal insufficiency, although random AM cortisol is 10 making it less likely, could consider stim test if no improvement   -- occasional temporal headache? Arteritis?  Check ESR, CRP    # Acute hypoxic respiratory failure. This happened on evening of 9/3, this has improved dramatically after couple of sessions of dialysis, suspect component of volume overload, pneumonia. A question of seizure was brought up as pt was shivering prior to this episode, along with some lethargy afterwards, however, on  speaking to family who were present at bedside the description does not sound typical for seizure  -- EEG was negative   -- Respiratory status much better and now weaned to room air , transfered out of ICU     # RUL pneumonia. Possible aspiration. although no cough or other symptoms of pneumonia, Continue IV Piperacillin/Tazobactam, check pro-calcitonin to guide ABx course,     # Episode of slurred speech.  There is concern for CVA. Certainly is at risk as he has several risk factors for vascular disease.  According to his brother, he has had a couple of episodes where he is having word finding difficulties and slurred speech and then it resolved spontaneously.  He has had several echocardiograms and had a bubble study in 01/2017, which did not show any shunt.    -- Discussed case with stroke neurology, On his original CTA there is maybe a short area of occlusion in a small branch of MCA. His MRI brain was read as having no acute stroke, although d/w neurology and there maybe a punctate area of stroke in frontal lobe. The repeat CTA is negative   -- Continue asa 325 mg daily, given a Clopidogrel load of 300 mg, and do Plavix 75 mg daily x 3 weeks  -- Tele, cardiac monitor on discharge  -- Statin   -- PT/OT    #  Abdominal pain. Most likely related to constipation, resolved now after BM     #  Near-syncope.  He says he has had a few of these over the past week.  He has had less intense episodes in the past, but he feels that these are worsening.   -- We request physical therapy evaluation to assess his walking and monitor for recurrence.   -- Monitor BP especially on dialysis days to see if there is a corelation   -- Work up as above    #  Hypertension/CAD.  He is on several antihypertensive medications, resume baseline home medication, he has accelerated HTN, with systolic upto 200 at times, although has had high BP in past as well, We will just resume him on the baseline medications for now and adjust based on  trend, last cath was in march 2018, which showed patent stents, aggressive medical management was advised   -- BP is better now after dialysis, will adjust based on trend if remains hypertensive, may have to cut down on meds if gets hypotensive after dialysis      #  IDDM. With several episodes of hypoglycemia, will stop all insulin for now and only use sliding scale insulin. Resume Lantus once oral intake improves   #  Human immunodeficiency virus.  We will reconcile his antiviral medications and resume.   #  End-stage renal disease.  Nephrology following to manage dialysis   #  Possible cystitis on CT.  He is currently not having any symptoms associated with this.  UA negative. fup as outpatient         DVT Prophylaxis: Pneumatic Compression Devices    Code Status: Full Code    Disposition: Expected discharge in 2 days to TCU vs ARU if remains stable after dialysis without further spells     [facility]    Interval History   Chart reviewed and patient seen. Case discussed with nursing staff.     Patient feels ok today, no recurrence of symptoms yet, still generalized weakness persists, feels below his baseline, no headache, no chest pain or SOB, no cough, Speech is better, No other focal weakness or numbness, No other complaints voiced.     -Data reviewed today: I reviewed all new labs and imaging results over the last 24 hours. I personally reviewed .  # Pain Assessment:  Current Pain Score 9/5/2018   Patient currently in pain? yes   Pain score (0-10) 5   Pain location Foot   Pain descriptors Burning   CPOT pain score -        Physical Exam   Temp: 97.5  F (36.4  C) Temp src: Axillary BP: 149/75   Heart Rate: 66 Resp: 18 SpO2: 90 % O2 Device: None (Room air)    Vitals:    09/03/18 2010 09/04/18 0200 09/04/18 0758   Weight: 89 kg (196 lb 3.4 oz) 85.2 kg (187 lb 13.3 oz) 85.2 kg (187 lb 13.3 oz)     Vital Signs with Ranges  Temp:  [97.5  F (36.4  C)-98.5  F (36.9  C)] 97.5  F (36.4  C)  Heart Rate:  [65-79]  66  Resp:  [16-18] 18  BP: (149-181)/(75-88) 149/75  SpO2:  [90 %-97 %] 90 %  I/O last 3 completed shifts:  In: 1320 [P.O.:1320]  Out: -     GENERAL:  Awake and alert, No acute distress.  PSYCH: appropriate affect, no acute agitation   HEENT:  Neck is Supple, trachea is midline, EOMI, conjunctiva clear  CARDIOVASCULAR: Regular rate and rhythm, Normal S1, S2, no loud murmurs  PULMONARY:  Clear to auscultation , no wheezing, occasional crackle on right side   GI: Abdomen is soft, non tender, non-distended, bowel sounds present. No rebound or guarding   SKIN:  No cyanosis or clubbing  MSK: Extremities are warm and well perfused. No pitting edema   Neuro: Awake and oriented x 3. Moving all extremities with good strength, mild dysmetria on both sides, no facial droop noted      Medications     nitroGLYcerin Stopped (09/03/18 2000)     - MEDICATION INSTRUCTIONS -       - MEDICATION INSTRUCTIONS -       - MEDICATION INSTRUCTIONS -         - MEDICATION INSTRUCTIONS for Dialysis Patients -   Does not apply See Admin Instructions     sodium chloride 0.9%  250 mL Intravenous Once in dialysis     sodium chloride 0.9%  300 mL Hemodialysis Machine Once     abacavir  600 mg Oral QPM     aspirin  325 mg Oral Daily     atorvastatin  40 mg Oral At Bedtime     carvedilol  12.5 mg Oral BID w/meals     clopidogrel (PLAVIX) tablet 75 mg  75 mg Oral QPM     dapsone  100 mg Oral At Bedtime     darunavir  800 mg Oral At Bedtime     dolutegravir  50 mg Oral At Bedtime     epoetin brittni (EPOGEN,PROCRIT) inj ESRD  6,000 Units Intravenous Once in dialysis     gabapentin  300 mg Oral BID     hydrALAZINE  25 mg Oral TID     insulin aspart  1-7 Units Subcutaneous TID AC     insulin aspart  1-5 Units Subcutaneous At Bedtime     irbesartan  300 mg Oral At Bedtime     isosorbide mononitrate  120 mg Oral QPM     mirtazapine  15 mg Oral At Bedtime     - MEDICATION INSTRUCTIONS -   Does not apply Once     omeprazole  40 mg Oral Daily      piperacillin-tazobactam  2.25 g Intravenous Q8H JAN     ritonavir  100 mg Oral At Bedtime     sodium bicarbonate  25 mEq Intravenous Once     sodium polystyrene  30 g Oral Once     sucroferric oxyhydroxide  500 mg Oral TID w/meals     thiamine  100 mg Oral 3 times daily       Data   All new lab data and imaging results from today have been reviewed       Recent Labs  Lab 09/06/18  0622 09/05/18  0653 09/04/18  1754 09/04/18  1447 09/04/18  0531  09/01/18  1146   WBC 5.4 4.9  --   --  7.4  < >  --    HGB 8.1* 8.4*  --   --  8.5*  < >  --    MCV 97 97  --   --  92  < >  --    * 111*  --   --  103*  < >  --    * 132*  --  133 131*  < > 133   POTASSIUM 4.7 4.4  --  4.3 5.5*  < > 4.2   CHLORIDE 95 95  --  98 92*  < > 96   CO2 26 28  --  27 26  < > 26   BUN 58* 35*  --  20 48*  < > 34*   CR 8.14* 6.17*  --  4.07* 7.09*  < > 5.38*   ANIONGAP 11 9  --  8 13  < > 11   PRABHA 8.4* 9.5  --  9.4 8.6  < > 9.3   * 127*  --  96 76  < > 171*   ALBUMIN 3.3*  --   --   --   --   --  4.1   PROTTOTAL 6.8  --   --   --   --   --  8.0   BILITOTAL 0.4  --   --   --   --   --  0.5   ALKPHOS 124  --   --   --   --   --  146   ALT 14  --   --   --   --   --  19   AST 16  --   --   --   --   --  19   LIPASE  --   --   --   --   --   --  205   TROPI  --   --  <0.015  --   --   --  <0.015   < > = values in this interval not displayed.    No results found for this or any previous visit (from the past 24 hour(s)).

## 2018-09-06 NOTE — PLAN OF CARE
Problem: Patient Care Overview  Goal: Plan of Care/Patient Progress Review  Outcome: Improving  Pt up sitting in chair for several hours this shift. A/o cooperative, family at bed side. VSS afebrile. LS clear. Tele SR. NEURO:  equal  strength bilaterally, finger to nose normal, ambulates without difficulty SBA with walker. PERRLA no focal deficits noted. No c/o chest pain or difficulty breathing. Left Hemodialysis fistula clean dry intact bruit and thrill present. On renal diet good appetite.  at dinner time and 152 at bed time. Plan for HD tomorrow after 11 AM or noon per [facility]. Will monitor and continue POC.

## 2018-09-06 NOTE — PROGRESS NOTES
SWS  Received phone call from Maura Blevins CM:  [phone].  Provided update on current status and phone number for Corinne White.  P:  SW remains available

## 2018-09-06 NOTE — PROGRESS NOTES
[facility]     Renal Progress Note       SHORTHAND KEY FOR MY NOTES:  c = with, s = without, p = after, a = before, x = except, asx = asymptomatic, tx = transplant or treatment, sx = symptoms or symptomatic, cx = canceled or culture, rxn = reaction, yday = yesterday, nl = normal, abx = antibiotics, fxn = function, dx = diagnosis, dz = disease, m/h = melena/hematochezia, c/d/l/ha = cramping/dizziness/lightheadedness/headache, d/c = discharge or diarrhea/constipation, f/c/n/v = fevers/chills/nausea/vomiting, cp/sob = chest pain/shortness of breath.         Assessment/Plan:     1.  ESKD.  Pt is due to run today.  He had stroke sx again p the run on Tues, so we will be more gentle in pulling fluid and keeping SBP > 120.  A.  HD today.  Goal -2-3L UF (will prob end up being closer to 2L), but we will keep SBP > 120.  B.  Still consider 4d/wk schedule.    2.  Possible CVA.  EEG was neg.  Last sx were on Tues.  A.  Follow clinically during/after the run today.    3.  HTN.  Controlled c multiple meds.    A.  Goal SBP > 120.  B.  Adjust meds based on if he drops too low.  First change is to stop hydralazine.        Interval History:     Pt felt dizzy in the shower today.  No f/c/n/v.  No significant cp/sob.  He does have abd discomfort and has been constipated.  Eating ok.      He has not had any stroke/TIA sx today.  He notes that he's had a HA the last few runs.          Medications and Allergies:       - MEDICATION INSTRUCTIONS for Dialysis Patients -   Does not apply See Admin Instructions     abacavir  600 mg Oral QPM     aspirin  325 mg Oral Daily     atorvastatin  40 mg Oral At Bedtime     carvedilol  12.5 mg Oral BID w/meals     clopidogrel (PLAVIX) tablet 75 mg  75 mg Oral QPM     dapsone  100 mg Oral At Bedtime     darunavir  800 mg Oral At Bedtime     dolutegravir  50 mg Oral At Bedtime     gabapentin  300 mg Oral BID     hydrALAZINE  25 mg Oral TID     insulin aspart  1-7 Units Subcutaneous TID AC      insulin aspart  1-5 Units Subcutaneous At Bedtime     irbesartan  300 mg Oral At Bedtime     isosorbide mononitrate  120 mg Oral QPM     mirtazapine  15 mg Oral At Bedtime     omeprazole  40 mg Oral Daily     piperacillin-tazobactam  2.25 g Intravenous Q8H JAN     ritonavir  100 mg Oral At Bedtime     sodium bicarbonate  25 mEq Intravenous Once     sodium polystyrene  30 g Oral Once     sucroferric oxyhydroxide  500 mg Oral TID w/meals     thiamine  100 mg Oral 3 times daily      Allergies   Allergen Reactions     Calcium Acetate Other (See Comments)     Other reaction(s): Other, see comments  Pain in chest and back  Pain in chest area (sensitive skin)      Diagnostic X-Ray Materials Other (See Comments)     PN: renal failure     Lisinopril      Sulfa Drugs           Physical Exam:     Vitals were reviewed    Heart Rate: 66, Blood pressure 149/75, pulse 82, temperature 97.5  F (36.4  C), temperature source Axillary, resp. rate 18, height 1.829 m (6'), weight 85.2 kg (187 lb 13.3 oz), SpO2 90 %.  Wt Readings from Last 3 Encounters:   09/04/18 85.2 kg (187 lb 13.3 oz)   07/14/18 86.6 kg (190 lb 14.7 oz)   04/11/18 85.7 kg (189 lb)     Intake/Output Summary (Last 24 hours) at 09/06/18 1119  Last data filed at 09/06/18 1000   Gross per 24 hour   Intake             1320 ml   Output                0 ml   Net             1320 ml     GENERAL APPEARANCE: pleasant, NAD, alert, interactive, appropriate  HEENT:  Eyes/ears/nose/neck grossly normal  RESP: lungs seem ok  CV: RRR c 2/6 m, nl S1/S2   ABDOMEN: o/s/nt/nd  EXTREMITIES/SKIN: no significant ble edema  OTHER:  LUAF - aneurysmal         Data:     CBC RESULTS:    Recent Labs  Lab 09/06/18  0622 09/05/18  0653 09/04/18  0531 09/03/18  0848 09/02/18  1228 09/01/18  1147   WBC 5.4 4.9 7.4 6.0 4.7 6.1   RBC 2.70* 2.83* 2.84* 2.87* 2.79* 3.14*   HGB 8.1* 8.4* 8.5* 8.6* 8.3* 9.4*   HCT 26.2* 27.5* 26.2* 27.7* 27.3* 30.4*   * 111* 103* 122* 106* 123*     Basic Metabolic  Panel:    Recent Labs  Lab 09/06/18  0622 09/05/18  0653 09/04/18  1447 09/04/18  0531 09/03/18  2137 09/03/18  1750 09/03/18  0848   * 132* 133 131*  --  126* 130*   POTASSIUM 4.7 4.4 4.3 5.5* 4.0 7.1* 5.5*   CHLORIDE 95 95 98 92*  --  91* 92*   CO2 26 28 27 26  --  22 22   BUN 58* 35* 20 48*  --  77* 73*   CR 8.14* 6.17* 4.07* 7.09*  --  10.10* 9.77*   * 127* 96 76  --  112* 112*   PRABHA 8.4* 9.5 9.4 8.6  --  8.6 8.4*     INRNo lab results found in last 7 days.   Attestation:   I have reviewed today's relevant vital signs, notes, medications, labs and imaging.    Bipin Chaves MD  [facility] - Nephrology  [phone]

## 2018-09-06 NOTE — PLAN OF CARE
Problem: Patient Care Overview  Goal: Plan of Care/Patient Progress Review  PT: Attempted to see pt this afternoon as chart started pt would be receiving HD in the morning. Pt did not start HD until after 1pm, PT re-scheduled for tomorrow.

## 2018-09-06 NOTE — PROGRESS NOTES
Potassium   Date Value Ref Range Status   09/06/2018 4.7 3.4 - 5.3 mmol/L Final   ]  Lab Results   Component Value Date    HGB 8.1 09/06/2018     Weight: 85.2 kg (187 lb 13.3 oz)    DIALYSIS PROCEDURE NOTE    Patient dialyzed for 3.5 hrs on a 2 K bath with a  net fluid removal of 2L.  A BFR of 400ml/min was obtained via LUAF and all connections secured.   Patient was discussed with  during treatment.  Total heparin received during treatment:: 0units.   Meds given:EPO. Complications:none   Procedure and ESRD teaching done and questions answered.  See flowsheet in EPIC for further details.  Hepatitis status confirmed on previous patient.    RO log completed  Prime given: NS  Saline double clamped and Arterial/Venous parameters set.   Patient transported via walker to the dialysis unit   Aseptic prep done for both on/off.  Transducer connectors checked q15 minutes with vital sign check  :  Report received from: ALLYN Syed RN  Report given to: LUIS ARMANDO Mendiola RN  Outpatient Dialysis at  [place]    Hepatitis Antigen neg   Hepatitis Antibody immune 1/20/18

## 2018-09-06 NOTE — PLAN OF CARE
Problem: Patient Care Overview  Goal: Plan of Care/Patient Progress Review  Outcome: No Change  VSS. Tele - SR. Blood sugars 159 and 225. Fistula to left arm. Lung sounds clear. Currently in HD. Continuing Zosyn. Plan: possibly discharge tomorrow.

## 2018-09-06 NOTE — PLAN OF CARE
Problem: Patient Care Overview  Goal: Plan of Care/Patient Progress Review  Outcome: No Change  All VSS, LS clear and satting well on RA.  , tele: SR.  Receiving IV Zosyn.  Plan is for HD this morning.  PT, OT and neuro following.  Plan is to discharge in 2 days to TCU vs ARU with  Cardiac monitor.  Continue with POC.

## 2018-09-07 ENCOUNTER — APPOINTMENT (OUTPATIENT)
Dept: OCCUPATIONAL THERAPY | Facility: CLINIC | Age: 68
DRG: 312 | End: 2018-09-07
Payer: MEDICARE

## 2018-09-07 ENCOUNTER — APPOINTMENT (OUTPATIENT)
Dept: PHYSICAL THERAPY | Facility: CLINIC | Age: 68
DRG: 312 | End: 2018-09-07
Payer: MEDICARE

## 2018-09-07 LAB
ANION GAP SERPL CALCULATED.3IONS-SCNC: 11 MMOL/L (ref 3–14)
BUN SERPL-MCNC: 36 MG/DL (ref 7–30)
CALCIUM SERPL-MCNC: 9 MG/DL (ref 8.5–10.1)
CHLORIDE SERPL-SCNC: 97 MMOL/L (ref 94–109)
CO2 SERPL-SCNC: 26 MMOL/L (ref 20–32)
CREAT SERPL-MCNC: 5.56 MG/DL (ref 0.66–1.25)
CRP SERPL-MCNC: 30.9 MG/L (ref 0–8)
ERYTHROCYTE [DISTWIDTH] IN BLOOD BY AUTOMATED COUNT: 15.4 % (ref 10–15)
ERYTHROCYTE [SEDIMENTATION RATE] IN BLOOD BY WESTERGREN METHOD: 66 MM/H (ref 0–20)
GFR SERPL CREATININE-BSD FRML MDRD: 10 ML/MIN/1.7M2
GLUCOSE BLDC GLUCOMTR-MCNC: 100 MG/DL (ref 70–99)
GLUCOSE BLDC GLUCOMTR-MCNC: 143 MG/DL (ref 70–99)
GLUCOSE BLDC GLUCOMTR-MCNC: 172 MG/DL (ref 70–99)
GLUCOSE BLDC GLUCOMTR-MCNC: 202 MG/DL (ref 70–99)
GLUCOSE BLDC GLUCOMTR-MCNC: 209 MG/DL (ref 70–99)
GLUCOSE SERPL-MCNC: 120 MG/DL (ref 70–99)
HCT VFR BLD AUTO: 25.1 % (ref 40–53)
HGB BLD-MCNC: 7.8 G/DL (ref 13.3–17.7)
MCH RBC QN AUTO: 30 PG (ref 26.5–33)
MCHC RBC AUTO-ENTMCNC: 31.1 G/DL (ref 31.5–36.5)
MCV RBC AUTO: 97 FL (ref 78–100)
PLATELET # BLD AUTO: 102 10E9/L (ref 150–450)
POTASSIUM SERPL-SCNC: 3.8 MMOL/L (ref 3.4–5.3)
RBC # BLD AUTO: 2.6 10E12/L (ref 4.4–5.9)
SODIUM SERPL-SCNC: 134 MMOL/L (ref 133–144)
WBC # BLD AUTO: 4.1 10E9/L (ref 4–11)

## 2018-09-07 PROCEDURE — 97116 GAIT TRAINING THERAPY: CPT | Mod: GP | Performed by: PHYSICAL THERAPY ASSISTANT

## 2018-09-07 PROCEDURE — 25000132 ZZH RX MED GY IP 250 OP 250 PS 637: Mod: GY | Performed by: INTERNAL MEDICINE

## 2018-09-07 PROCEDURE — 12000007 ZZH R&B INTERMEDIATE

## 2018-09-07 PROCEDURE — 85652 RBC SED RATE AUTOMATED: CPT | Performed by: INTERNAL MEDICINE

## 2018-09-07 PROCEDURE — 97110 THERAPEUTIC EXERCISES: CPT | Mod: GP | Performed by: PHYSICAL THERAPY ASSISTANT

## 2018-09-07 PROCEDURE — 25000128 H RX IP 250 OP 636: Performed by: INTERNAL MEDICINE

## 2018-09-07 PROCEDURE — 99233 SBSQ HOSP IP/OBS HIGH 50: CPT | Performed by: INTERNAL MEDICINE

## 2018-09-07 PROCEDURE — 97535 SELF CARE MNGMENT TRAINING: CPT | Mod: GO

## 2018-09-07 PROCEDURE — A9270 NON-COVERED ITEM OR SERVICE: HCPCS | Mod: GY | Performed by: INTERNAL MEDICINE

## 2018-09-07 PROCEDURE — 97530 THERAPEUTIC ACTIVITIES: CPT | Mod: GO

## 2018-09-07 PROCEDURE — 00000146 ZZHCL STATISTIC GLUCOSE BY METER IP

## 2018-09-07 PROCEDURE — 40000133 ZZH STATISTIC OT WARD VISIT

## 2018-09-07 PROCEDURE — 25000132 ZZH RX MED GY IP 250 OP 250 PS 637: Mod: GY | Performed by: PSYCHIATRY & NEUROLOGY

## 2018-09-07 PROCEDURE — 86140 C-REACTIVE PROTEIN: CPT | Performed by: INTERNAL MEDICINE

## 2018-09-07 PROCEDURE — 36415 COLL VENOUS BLD VENIPUNCTURE: CPT | Performed by: INTERNAL MEDICINE

## 2018-09-07 PROCEDURE — 80048 BASIC METABOLIC PNL TOTAL CA: CPT | Performed by: INTERNAL MEDICINE

## 2018-09-07 PROCEDURE — 85027 COMPLETE CBC AUTOMATED: CPT | Performed by: INTERNAL MEDICINE

## 2018-09-07 PROCEDURE — 40000193 ZZH STATISTIC PT WARD VISIT: Performed by: PHYSICAL THERAPY ASSISTANT

## 2018-09-07 RX ADMIN — Medication 100 MG: at 00:20

## 2018-09-07 RX ADMIN — GABAPENTIN 300 MG: 300 CAPSULE ORAL at 22:10

## 2018-09-07 RX ADMIN — HYDRALAZINE HYDROCHLORIDE 25 MG: 25 TABLET ORAL at 08:38

## 2018-09-07 RX ADMIN — CLOPIDOGREL 75 MG: 75 TABLET, FILM COATED ORAL at 22:11

## 2018-09-07 RX ADMIN — ABACAVIR 600 MG: 300 TABLET, FILM COATED ORAL at 22:10

## 2018-09-07 RX ADMIN — IRBESARTAN 300 MG: 150 TABLET ORAL at 22:11

## 2018-09-07 RX ADMIN — Medication 100 MG: at 08:39

## 2018-09-07 RX ADMIN — HYDRALAZINE HYDROCHLORIDE 25 MG: 25 TABLET ORAL at 15:48

## 2018-09-07 RX ADMIN — OMEPRAZOLE 40 MG: 20 CAPSULE, DELAYED RELEASE ORAL at 08:39

## 2018-09-07 RX ADMIN — GABAPENTIN 300 MG: 300 CAPSULE ORAL at 08:39

## 2018-09-07 RX ADMIN — ATORVASTATIN CALCIUM 40 MG: 40 TABLET, FILM COATED ORAL at 22:10

## 2018-09-07 RX ADMIN — RITONAVIR 100 MG: 100 TABLET, FILM COATED ORAL at 22:10

## 2018-09-07 RX ADMIN — INSULIN ASPART 1 UNITS: 100 INJECTION, SOLUTION INTRAVENOUS; SUBCUTANEOUS at 12:59

## 2018-09-07 RX ADMIN — DOLUTEGRAVIR SODIUM 50 MG: 50 TABLET, FILM COATED ORAL at 22:10

## 2018-09-07 RX ADMIN — ISOSORBIDE MONONITRATE 120 MG: 60 TABLET, EXTENDED RELEASE ORAL at 22:11

## 2018-09-07 RX ADMIN — HYDRALAZINE HYDROCHLORIDE 25 MG: 25 TABLET ORAL at 22:10

## 2018-09-07 RX ADMIN — ASPIRIN 325 MG ORAL TABLET 325 MG: 325 PILL ORAL at 12:58

## 2018-09-07 RX ADMIN — CARVEDILOL 12.5 MG: 12.5 TABLET, FILM COATED ORAL at 08:39

## 2018-09-07 RX ADMIN — DAPSONE 100 MG: 100 TABLET ORAL at 22:11

## 2018-09-07 RX ADMIN — CARVEDILOL 12.5 MG: 12.5 TABLET, FILM COATED ORAL at 22:10

## 2018-09-07 RX ADMIN — TROLAMINE SALICYLATE: 10 CREAM TOPICAL at 00:24

## 2018-09-07 RX ADMIN — DARUNAVIR 800 MG: 800 TABLET, FILM COATED ORAL at 22:14

## 2018-09-07 RX ADMIN — ACETAMINOPHEN 650 MG: 325 TABLET, FILM COATED ORAL at 00:20

## 2018-09-07 RX ADMIN — MIRTAZAPINE 15 MG: 15 TABLET, FILM COATED ORAL at 22:10

## 2018-09-07 RX ADMIN — TAZOBACTAM SODIUM AND PIPERACILLIN SODIUM 2.25 G: 250; 2 INJECTION, SOLUTION INTRAVENOUS at 04:19

## 2018-09-07 RX ADMIN — INSULIN ASPART 2 UNITS: 100 INJECTION, SOLUTION INTRAVENOUS; SUBCUTANEOUS at 18:39

## 2018-09-07 RX ADMIN — TAZOBACTAM SODIUM AND PIPERACILLIN SODIUM 2.25 G: 250; 2 INJECTION, SOLUTION INTRAVENOUS at 12:58

## 2018-09-07 ASSESSMENT — ACTIVITIES OF DAILY LIVING (ADL)
ADLS_ACUITY_SCORE: 13

## 2018-09-07 NOTE — PROGRESS NOTES
CM received call from Ariel Montalvo to ARU. Patient's HD was changed to 4 days/week (MTRS). ARU will NOT take pt with 4 days of HD d/t missing too many therapy sessions. Hospitalist is planning on pt returning to home this weekend.     SW/GWYN will continue to follow patient for discharge planning.     Ruby Hinojosa RN, BSN, [facility]  [phone]

## 2018-09-07 NOTE — PLAN OF CARE
Problem: Patient Care Overview  Goal: Plan of Care/Patient Progress Review  Discharge Planner PT   Patient plan for discharge: home, but open  Current status: able to particiapte in PT today did walk with RW to 75 feet with no issues noted HEP established for LE  Barriers to return to prior living situation: stairs  Recommendations for discharge: ARC per plan established by the PT.  Rationale for recommendations: more able to particiapte in sessions and with continued skilled PT will progress.       Entered by: Karen Machado 09/07/2018 11:53 AM

## 2018-09-07 NOTE — PLAN OF CARE
Problem: Patient Care Overview  Goal: Plan of Care/Patient Progress Review  Outcome: Improving  Pt up SBA with walker and gait belt.  HD today. Pt reported feeling light headed post dialysis. A/O awake cooperative, family at bed side. VSS afebrile. LS clear RA. Tele SR. Left arm Fistula clear dry intact thrill and bruit present. On IV abx Zosyn.  at dinner time and 186 at bed time. Will monitor and continue POC.

## 2018-09-07 NOTE — CONSULTS
"CLINICAL NUTRITION SERVICES  -  ASSESSMENT NOTE      MALNUTRITION:  % Weight Loss:  None noted  % Intake:  No decreased intake noted  Subcutaneous Fat Loss:  None observed  Muscle Loss:  Temporal region mild depletion, Acromion bone region mild depletion and Dorsal hand region mild depletion  Fluid Retention:  None noted    Malnutrition Diagnosis: Patient does not meet two of the above criteria necessary for diagnosing malnutrition        REASON FOR ASSESSMENT  Donald Raza is a 68 year old male seen by Registered Dietitian for LOS.      NUTRITION HISTORY  - Information obtained from patient and chart.    - Patient with a h/o ESRD on HD, DMII (on insulin PTA), HIV, CAD, CHF.    - Patient has been residing with one of daughters.  - Per previous RD documentation, able to verbalize dialysis diet restrictions, takes Nepro daily for increased protein intake, follows with HD RD.  - This admit patient reports he follows a \"Donadl\" diet.   - Meals TID is baseline.  Also takes 3 Nepro daily with meals (likes butter pecan only).  - Denies decrease in PO intakes PTA.   - NKFA.      CURRENT NUTRITION ORDERS  Diet Order:     Combination renal/mod CHO  Nepro BID between meals    Current Intake/Tolerance:  SLP initially following for dysarthria treatment.  Bedside eval 9/04 d/t possible change in status, regular diet with thins recommended.  % meal consumption since admit.  Consistently ordering meals BID-TID.  Consuming 2 supplements daily.  Would prefer to receive different flavor with meals as opposed to between.  He is not fond of our food or his diet restrictions.        PHYSICAL FINDINGS  Observed  See below   Obtained from Chart/Interdisciplinary Team  HD runs 9/03, 9/04, 9/06  Frequent BMs    ANTHROPOMETRICS  Height: 6' 0\"  Weight: 86.8 kg (191#)  Body mass index is 25.97 kg/(m^2).  Weight Status:  Overweight BMI 25-29.9  IBW: 80.9 kg (178#)  % IBW: 107%  Weight History:  Wt Readings from Last 10 Encounters: "   09/07/18 86.9 kg (191 lb 8 oz)   07/14/18 86.6 kg (190 lb 14.7 oz)   04/11/18 85.7 kg (189 lb)   03/16/18 79.8 kg (175 lb 14.8 oz)   02/01/18 87.8 kg (193 lb 9.6 oz)   02/01/18 90.3 kg (199 lb 1.2 oz)   01/29/18 91.3 kg (201 lb 4.8 oz)   01/19/18 85.3 kg (188 lb)   01/17/18 88.9 kg (196 lb)   12/27/17 85.9 kg (189 lb 6.4 oz)   - Current wt consistent with that in 4/2018 and 7/2018.     LABS  Labs reviewed including BG trending during admit  Lab Results   Component Value Date    A1C Canceled, Test credited 09/03/2018    A1C Canceled, Test credited 09/02/2018    A1C Canceled, Test credited 03/08/2018    A1C Canceled, Test credited 02/01/2018    A1C Canceled, Test credited 01/19/2018       MEDICATIONS  Medications reviewed:  - Insulin regimen reviewed  - 15 mg Remeron at HS  - Velphoro TID  - No MVI      ASSESSED NUTRITION NEEDS PER APPROVED PRACTICE GUIDELINES:    Dosing Weight 86.8 kg   Estimated Energy Needs: 1742-4258 kcals (25-30 Kcal/Kg)  Justification: maintenance  Estimated Protein Needs: 104-130 grams protein (1.2-1.5 g pro/Kg)  Justification: dialysis and preservation of lean body mass  Estimated Fluid Needs:  1 mL/Kcal  Justification: per provider pending fluid status given HD pt    MALNUTRITION:  % Weight Loss:  None noted  % Intake:  No decreased intake noted  Subcutaneous Fat Loss:  None observed  Muscle Loss:  Temporal region mild depletion, Acromion bone region mild depletion and Dorsal hand region mild depletion  Fluid Retention:  None noted    Malnutrition Diagnosis: Patient does not meet two of the above criteria necessary for diagnosing malnutrition    NUTRITION DIAGNOSIS:  Increased nutrient needs (protein) related to HD patient with protein needs >/=1.2 g/kg/day.      NUTRITION INTERVENTIONS  Recommendations / Nutrition Prescription  Change to dialysis combination (not renal) diet, change to high CHO combination d/t assessed needs as above.    Change flavor and timings/frequency of  supplements.    Add renal MVI.      Implementation  Nutrition education: Provided education on change in supplements as above. Discussed current diet.    Medical Food Supplement, Multivitamin/Mineral: As above.    Collaboration and Referral of Nutrition care: Discussed POC with team during rounds.      Nutrition Goals  Patient to consume at least 75% of meals TID + 1-2 supplements daily.      MONITORING AND EVALUATION:  Progress towards goals will be monitored and evaluated per protocol and Practice Guidelines        Kylee Modi RD, LD  Clinical Dietitian  3rd floor/ICU: [phone]  All other floors: [phone]  Weekend/holiday: [phone]

## 2018-09-07 NOTE — PLAN OF CARE
Problem: Kidney Disease, Chronic/End Stage Renal Disease (Adult)  Goal: Signs and Symptoms of Listed Potential Problems Will be Absent, Minimized or Managed (Kidney Disease, Chronic/End Stage Renal Disease)  Signs and symptoms of listed potential problems will be absent, minimized or managed by discharge/transition of care (reference Kidney Disease, Chronic/End Stage Renal Disease (Adult) CPG).   Outcome: No Change  VS stable. Complained of bilateral toe pain and pain med's and ice given. Tele is sinus rhythm. Slept on and off throughout the night.

## 2018-09-07 NOTE — PROGRESS NOTES
[facility]     Renal Progress Note       SHORTHAND KEY FOR MY NOTES:  c = with, s = without, p = after, a = before, x = except, asx = asymptomatic, tx = transplant or treatment, sx = symptoms or symptomatic, cx = canceled or culture, rxn = reaction, yday = yesterday, nl = normal, abx = antibiotics, fxn = function, dx = diagnosis, dz = disease, m/h = melena/hematochezia, c/d/l/ha = cramping/dizziness/lightheadedness/headache, d/c = discharge or diarrhea/constipation, f/c/n/v = fevers/chills/nausea/vomiting, cp/sob = chest pain/shortness of breath.         Assessment/Plan:     1.  ESKD.  Pt will run tmrw per TRS schedule.  Pt is agreeable to the increased regimen.  Will d/w Dr. Chavez.  KITA.  HD planned for tmrw.   B.  Keep SBP > 120 - orders also called to [facility].  C.  He should run on a MTRS schedule (4d/wk) so that he doesn't accumulate too much fluid.    2.  Possible CVA/TIA.  No sx p HD yday.  A.  Follow clinically.    3.  HTN.  Controlled c multiple meds.    A.  Goal SBP > 120.  B.  Adjust meds prn.  First change is to stop hydralazine.    4.  Anemia.  Hb is lower again despite volume removal.  He denies any GIB, but he did have diarrhea and abd pain yday.  No other signs of bleeding.  He is on clopidogrel and takes oral fe.  A.  Follow hb, clinically.        Interval History:     Pt had diarrhea x 4-5 post-HD yday.  He still has some abd discomfort, but the abd is softer.  No f/c/n/v.  No cp/sob.  Eating ok.            Medications and Allergies:       - MEDICATION INSTRUCTIONS for Dialysis Patients -   Does not apply See Admin Instructions     abacavir  600 mg Oral QPM     aspirin  325 mg Oral Daily     atorvastatin  40 mg Oral At Bedtime     carvedilol  12.5 mg Oral BID w/meals     clopidogrel (PLAVIX) tablet 75 mg  75 mg Oral QPM     dapsone  100 mg Oral At Bedtime     darunavir  800 mg Oral At Bedtime     dolutegravir  50 mg Oral At Bedtime     gabapentin  300 mg Oral BID      hydrALAZINE  25 mg Oral TID     insulin aspart  1-7 Units Subcutaneous TID AC     insulin aspart  1-5 Units Subcutaneous At Bedtime     irbesartan  300 mg Oral At Bedtime     isosorbide mononitrate  120 mg Oral QPM     mirtazapine  15 mg Oral At Bedtime     [START ON 9/8/2018] NEPHROCAPS  1 capsule Oral Daily     omeprazole  40 mg Oral Daily     piperacillin-tazobactam  2.25 g Intravenous Q8H JAN     ritonavir  100 mg Oral At Bedtime     sodium bicarbonate  25 mEq Intravenous Once     sodium polystyrene  30 g Oral Once     sucroferric oxyhydroxide  500 mg Oral TID w/meals     thiamine  100 mg Oral 3 times daily      Allergies   Allergen Reactions     Calcium Acetate Other (See Comments)     Other reaction(s): Other, see comments  Pain in chest and back  Pain in chest area (sensitive skin)      Diagnostic X-Ray Materials Other (See Comments)     PN: renal failure     Lisinopril      Sulfa Drugs           Physical Exam:     Vitals were reviewed    Heart Rate: 65, Blood pressure 141/80, pulse 82, temperature 97.6  F (36.4  C), temperature source Oral, resp. rate 16, height 1.829 m (6'), weight 86.9 kg (191 lb 8 oz), SpO2 94 %.  Wt Readings from Last 3 Encounters:   09/07/18 86.9 kg (191 lb 8 oz)   07/14/18 86.6 kg (190 lb 14.7 oz)   04/11/18 85.7 kg (189 lb)     Intake/Output Summary (Last 24 hours) at 09/07/18 1025  Last data filed at 09/07/18 0900   Gross per 24 hour   Intake              960 ml   Output             2000 ml   Net            -1040 ml     GENERAL APPEARANCE: pleasant, NAD, alert  HEENT:  Eyes/ears/nose/neck grossly normal  RESP: lungs seem ok  CV: RRR c 2/6 m, nl S1/S2   ABDOMEN: o/s/nt/nd  EXTREMITIES/SKIN: no significant ble edema  OTHER:  LUAF - aneurysmal, good thrill/bruit         Data:     CBC RESULTS:    Recent Labs  Lab 09/07/18  0547 09/06/18  0622 09/05/18  0653 09/04/18  0531 09/03/18  0848 09/02/18  1228   WBC 4.1 5.4 4.9 7.4 6.0 4.7   RBC 2.60* 2.70* 2.83* 2.84* 2.87* 2.79*   HGB 7.8* 8.1*  8.4* 8.5* 8.6* 8.3*   HCT 25.1* 26.2* 27.5* 26.2* 27.7* 27.3*   * 121* 111* 103* 122* 106*     Basic Metabolic Panel:    Recent Labs  Lab 09/07/18  0547 09/06/18  0622 09/05/18  0653 09/04/18  1447 09/04/18  0531 09/03/18  2137 09/03/18  1750    132* 132* 133 131*  --  126*   POTASSIUM 3.8 4.7 4.4 4.3 5.5* 4.0 7.1*   CHLORIDE 97 95 95 98 92*  --  91*   CO2 26 26 28 27 26  --  22   BUN 36* 58* 35* 20 48*  --  77*   CR 5.56* 8.14* 6.17* 4.07* 7.09*  --  10.10*   * 144* 127* 96 76  --  112*   PRABHA 9.0 8.4* 9.5 9.4 8.6  --  8.6     INRNo lab results found in last 7 days.   Attestation:   I have reviewed today's relevant vital signs, notes, medications, labs and imaging.    Bipin Chaves MD  [facility] - Nephrology  [phone]

## 2018-09-07 NOTE — PLAN OF CARE
Problem: Kidney Disease, Chronic/End Stage Renal Disease (Adult)  Goal: Signs and Symptoms of Listed Potential Problems Will be Absent, Minimized or Managed (Kidney Disease, Chronic/End Stage Renal Disease)  Signs and symptoms of listed potential problems will be absent, minimized or managed by discharge/transition of care (reference Kidney Disease, Chronic/End Stage Renal Disease (Adult) CPG).   Outcome: Improving  Creatine 5.56  Recommendations per nephrologist:  To run patient 4x/week rather than the 3x  Speech near baseline    Improved.  Will have HD tomorrow.    PT/OT follow  Patient anticipates will discharge to home.    Lung sounds crackles left base.  IS reviewed.  To perform 10x/hr/wa

## 2018-09-07 NOTE — PROGRESS NOTES
"[facility]  Hospitalist Progress Note    Date of Service (when I saw the patient): 09/07/2018    Assessment & Plan     68-year-old man who presents with nausea, episode of slurred speech, episodes of lightheadedness and near-syncope that occurred while \"mid-way through\" dialysis.      PMH is notable for ESRD on HD, IDDM, HTN, HIV, CAD, diastolic heart failure and chronic anemia.     In the ED, he had complained of HA and at the time of initial evaluation was noted to be significantly hypertensive. He apparently has had significant constipation over the last several days and has been observed to be more ataxic than usual.      There initially was concern for possible CVA/TIA, but imaging has really been almost unequivocally reassuring. Patient was seen by neurology and Dr. Downing spoke directly with stroke neurology. Ultimately, although the possibility of a \"pin-point frontal lesion\" frontal stroke is considered, the symptoms and timing (recurrence) do not fit well with a CVA.  The \"finding\" of possible  does not seem impressive enough to explain the period of symptoms.    He had acute and rapid onset of hypoxia on evening of 9/3, His K was found to be 7.1, and new infiltrate on CXR. He was transferred to ICU and received dialysis with dramatic improvement in condition.     An additional episode of slurred speech and confusion occurred on 9/4, which was noted while pt had relatively low BP (107/52), resolved spontaneously. Repeat CT, CTA and MRI was negative.      # Recurrent episodes of near-syncope, dizziness, slurred speech. No clear cut etiology has been found for these episodes so far, though it seems most likely that these are episodes of cerebral hypoperfusion associated with mildly excessive removal of fluid.   --  It is also possible that he is having some transient hypotension, especially related to dialysis which may contribute to these episodes. This si NOT dialysis disequilibrium as the " BUN is not remarkably high, and it does not seen that shifting of cerebral osms would be the issue.  -- Thought about adrenal insufficiency, although random AM cortisol is 10 making it less likely, could consider stim test if no improvement   -- occasional temporal headache? Arteritis?  ESR and CRP are elevated, but multiple other possible explanations for those findings.   -- Discussed case with stroke neurology, On his original CTA there is maybe a short area of occlusion in a small branch of MCA. His MRI brain was read as having no acute stroke, although d/w neurology and there maybe a punctate area of stroke in frontal lobe. The repeat CTA is negative   -- Continue asa 325 mg daily, given a Clopidogrel load of 300 mg, and do Plavix 75 mg daily x 3 weeks      # Acute hypoxic respiratory failure, resolved. This happened on evening of 9/3, this has improved dramatically after couple of sessions of dialysis, suspect component of volume overload, pneumonia. A question of seizure was brought up as pt was shivering prior to this episode, along with some lethargy afterwards, however, on speaking to family who were present at bedside the description does not sound typical for seizure  -- EEG was negative   -- Respiratory status normalized and now weaned to room air, transfered out of ICU     # RUL pneumonia. Possible aspiration. although no cough or other symptoms of pneumonia.  Empirically on IV Piperacillin/Tazobactam. Course not compatible with PNA. Will stop abx and follow.    #  Hypertension/CAD.  He is on several antihypertensive medications, resume baseline home medication, he has accelerated HTN, with systolic upto 200 at times, although has had high BP in past as well, We will just resume him on the baseline medications for now and adjust based on trend, last cath was in march 2018, which showed patent stents, aggressive medical management was advised   -- BP is better now after dialysis. Nephrology recommends  "aiming for SBP > 120.    #  IDDM. With several episodes of hypoglycemia, will stop all insulin for now and only use sliding scale insulin. Resume Lantus once oral intake improves   #  Human immunodeficiency virus.  We will reconcile his antiviral medications and resume.   #  End-stage renal disease.  Nephrology following to manage dialysis   #  Possible cystitis on CT.  He is currently not having any symptoms associated with this.  UA negative. fup as outpatient         DVT Prophylaxis: Pneumatic Compression Devices    Code Status: Full Code    Disposition: Expected discharge tomorrow. Pt feels he can return directly home. OT/PT recommend skilled therapy to regain strength. Will discuss with family whether they feel pt can remain safe with the available supervision.    Adam Cali    Interval History   Chart reviewed and patient seen. Case discussed with nursing staff.     Patient feels ok today.  Reports that he thinks that Nephrology is removing too much volume and that his \"dry weight\" is too low. He is less uncomfortable today.     -Data reviewed today: I reviewed all new labs and imaging results over the last 24 hours. I personally reviewed .  # Pain Assessment:  Current Pain Score 9/7/2018   Patient currently in pain? denies   Pain score (0-10) -   Pain location -   Pain descriptors -   CPOT pain score -        Physical Exam   Temp: 97.9  F (36.6  C) Temp src: Oral BP: 164/73   Heart Rate: 72 Resp: 18 SpO2: 94 % O2 Device: None (Room air)    Vitals:    09/04/18 0200 09/04/18 0758 09/07/18 0629   Weight: 85.2 kg (187 lb 13.3 oz) 85.2 kg (187 lb 13.3 oz) 86.9 kg (191 lb 8 oz)     Vital Signs with Ranges  Temp:  [97.6  F (36.4  C)-98.6  F (37  C)] 97.9  F (36.6  C)  Heart Rate:  [63-84] 72  Resp:  [16-18] 18  BP: (135-195)/(65-87) 164/73  SpO2:  [93 %-96 %] 94 %  I/O last 3 completed shifts:  In: 1440 [P.O.:1440]  Out: 2000 [Other:2000]    GENERAL:  Awake and alert, No acute distress.  PSYCH: appropriate " affect, no acute agitation   HEENT:  Neck is Supple, trachea is midline, EOMI, conjunctiva clear  CARDIOVASCULAR: Regular rate and rhythm, Normal S1, S2, no loud murmurs  PULMONARY:  Clear to auscultation , no wheezing, occasional crackle on right side   GI: Abdomen is soft, non tender, non-distended, bowel sounds present. No rebound or guarding   SKIN:  No cyanosis or clubbing  MSK: Extremities are warm and well perfused. No pitting edema   Neuro: Awake and oriented x 3. Moving all extremities with good strength, mild dysmetria on both sides, no facial droop noted      Medications     nitroGLYcerin Stopped (09/03/18 2000)     - MEDICATION INSTRUCTIONS -       - MEDICATION INSTRUCTIONS -         - MEDICATION INSTRUCTIONS for Dialysis Patients -   Does not apply See Admin Instructions     abacavir  600 mg Oral QPM     aspirin  325 mg Oral Daily     atorvastatin  40 mg Oral At Bedtime     carvedilol  12.5 mg Oral BID w/meals     clopidogrel (PLAVIX) tablet 75 mg  75 mg Oral QPM     dapsone  100 mg Oral At Bedtime     darunavir  800 mg Oral At Bedtime     dolutegravir  50 mg Oral At Bedtime     gabapentin  300 mg Oral BID     hydrALAZINE  25 mg Oral TID     insulin aspart  1-7 Units Subcutaneous TID AC     insulin aspart  1-5 Units Subcutaneous At Bedtime     irbesartan  300 mg Oral At Bedtime     isosorbide mononitrate  120 mg Oral QPM     mirtazapine  15 mg Oral At Bedtime     [START ON 9/8/2018] NEPHROCAPS  1 capsule Oral Daily     omeprazole  40 mg Oral Daily     piperacillin-tazobactam  2.25 g Intravenous Q8H JAN     ritonavir  100 mg Oral At Bedtime     sodium bicarbonate  25 mEq Intravenous Once     sodium polystyrene  30 g Oral Once     sucroferric oxyhydroxide  500 mg Oral TID w/meals     thiamine  100 mg Oral 3 times daily       Data   All new lab data and imaging results from today have been reviewed       Recent Labs  Lab 09/07/18  0547 09/06/18  0622 09/05/18  0653 09/04/18  1754  09/01/18  1146   WBC 4.1  5.4 4.9  --   < >  --    HGB 7.8* 8.1* 8.4*  --   < >  --    MCV 97 97 97  --   < >  --    * 121* 111*  --   < >  --     132* 132*  --   < > 133   POTASSIUM 3.8 4.7 4.4  --   < > 4.2   CHLORIDE 97 95 95  --   < > 96   CO2 26 26 28  --   < > 26   BUN 36* 58* 35*  --   < > 34*   CR 5.56* 8.14* 6.17*  --   < > 5.38*   ANIONGAP 11 11 9  --   < > 11   PRABHA 9.0 8.4* 9.5  --   < > 9.3   * 144* 127*  --   < > 171*   ALBUMIN  --  3.3*  --   --   --  4.1   PROTTOTAL  --  6.8  --   --   --  8.0   BILITOTAL  --  0.4  --   --   --  0.5   ALKPHOS  --  124  --   --   --  146   ALT  --  14  --   --   --  19   AST  --  16  --   --   --  19   LIPASE  --   --   --   --   --  205   TROPI  --   --   --  <0.015  --  <0.015   < > = values in this interval not displayed.    No results found for this or any previous visit (from the past 24 hour(s)).

## 2018-09-07 NOTE — PLAN OF CARE
Problem: Patient Care Overview  Goal: Plan of Care/Patient Progress Review  Discharge Planner OT   Patient plan for discharge: Home  Current status: Pt completed toilet transfer on/off toilet with use of grab bar with CGA for safety. Pt required min A for toileting tasks for management of brief while in stance, able to complete hygiene while seated with SBA. Pt completed hand washing task in stance with CGA, mild unsteadiness noted. Pt required min A for LB dressing task while seated for B socks.   Barriers to return to prior living situation: Stairs, current level of assist for ADLs/transfers, impaired balance, decreased strength/functional activity tolerance  Recommendations for discharge: ARU  Rationale for recommendations: Pt making good progress towards goals, however continues to require assist with ADLs and mobility tasks and is below baseline. Pt would benefit from continued skilled OT to maximize safety and indep in ADLs/IADLs and mobility through improved strength and functional activity tolerance.         Entered by: Areli Ramírez 09/07/2018 12:22 PM

## 2018-09-08 ENCOUNTER — APPOINTMENT (OUTPATIENT)
Dept: OCCUPATIONAL THERAPY | Facility: CLINIC | Age: 68
DRG: 312 | End: 2018-09-08
Payer: MEDICARE

## 2018-09-08 ENCOUNTER — APPOINTMENT (OUTPATIENT)
Dept: PHYSICAL THERAPY | Facility: CLINIC | Age: 68
DRG: 312 | End: 2018-09-08
Payer: MEDICARE

## 2018-09-08 VITALS
OXYGEN SATURATION: 97 % | HEIGHT: 72 IN | TEMPERATURE: 98.1 F | RESPIRATION RATE: 20 BRPM | BODY MASS INDEX: 25.94 KG/M2 | WEIGHT: 191.5 LBS | HEART RATE: 82 BPM | SYSTOLIC BLOOD PRESSURE: 152 MMHG | DIASTOLIC BLOOD PRESSURE: 69 MMHG

## 2018-09-08 PROBLEM — M62.81 GENERALIZED MUSCLE WEAKNESS: Status: ACTIVE | Noted: 2018-09-08

## 2018-09-08 PROBLEM — R47.01 EXPRESSIVE APHASIA: Status: ACTIVE | Noted: 2018-09-08

## 2018-09-08 PROBLEM — I67.9 CEREBRAL HYPOPERFUSION: Status: ACTIVE | Noted: 2018-09-08

## 2018-09-08 LAB
ALBUMIN SERPL-MCNC: 3.3 G/DL (ref 3.4–5)
ANION GAP SERPL CALCULATED.3IONS-SCNC: 12 MMOL/L (ref 3–14)
BUN SERPL-MCNC: 55 MG/DL (ref 7–30)
CALCIUM SERPL-MCNC: 9 MG/DL (ref 8.5–10.1)
CHLORIDE SERPL-SCNC: 95 MMOL/L (ref 94–109)
CO2 SERPL-SCNC: 26 MMOL/L (ref 20–32)
CREAT SERPL-MCNC: 7.63 MG/DL (ref 0.66–1.25)
ERYTHROCYTE [DISTWIDTH] IN BLOOD BY AUTOMATED COUNT: 15.5 % (ref 10–15)
FERRITIN SERPL-MCNC: 1547 NG/ML (ref 26–388)
GFR SERPL CREATININE-BSD FRML MDRD: 7 ML/MIN/1.7M2
GLUCOSE BLDC GLUCOMTR-MCNC: 131 MG/DL (ref 70–99)
GLUCOSE BLDC GLUCOMTR-MCNC: 142 MG/DL (ref 70–99)
GLUCOSE BLDC GLUCOMTR-MCNC: 201 MG/DL (ref 70–99)
GLUCOSE SERPL-MCNC: 133 MG/DL (ref 70–99)
HCT VFR BLD AUTO: 25 % (ref 40–53)
HGB BLD-MCNC: 7.7 G/DL (ref 13.3–17.7)
IRON SATN MFR SERPL: 56 % (ref 15–46)
IRON SERPL-MCNC: 92 UG/DL (ref 35–180)
MCH RBC QN AUTO: 29.7 PG (ref 26.5–33)
MCHC RBC AUTO-ENTMCNC: 30.8 G/DL (ref 31.5–36.5)
MCV RBC AUTO: 97 FL (ref 78–100)
PHOSPHATE SERPL-MCNC: 6.2 MG/DL (ref 2.5–4.5)
PLATELET # BLD AUTO: 109 10E9/L (ref 150–450)
POTASSIUM SERPL-SCNC: 4.3 MMOL/L (ref 3.4–5.3)
RBC # BLD AUTO: 2.59 10E12/L (ref 4.4–5.9)
SODIUM SERPL-SCNC: 133 MMOL/L (ref 133–144)
TIBC SERPL-MCNC: 164 UG/DL (ref 240–430)
WBC # BLD AUTO: 4.2 10E9/L (ref 4–11)

## 2018-09-08 PROCEDURE — 97116 GAIT TRAINING THERAPY: CPT | Mod: GP

## 2018-09-08 PROCEDURE — 00000146 ZZHCL STATISTIC GLUCOSE BY METER IP

## 2018-09-08 PROCEDURE — 40000193 ZZH STATISTIC PT WARD VISIT

## 2018-09-08 PROCEDURE — 90937 HEMODIALYSIS REPEATED EVAL: CPT

## 2018-09-08 PROCEDURE — 25000132 ZZH RX MED GY IP 250 OP 250 PS 637: Mod: GY | Performed by: PSYCHIATRY & NEUROLOGY

## 2018-09-08 PROCEDURE — 25000132 ZZH RX MED GY IP 250 OP 250 PS 637: Mod: GY | Performed by: INTERNAL MEDICINE

## 2018-09-08 PROCEDURE — A9270 NON-COVERED ITEM OR SERVICE: HCPCS | Mod: GY | Performed by: INTERNAL MEDICINE

## 2018-09-08 PROCEDURE — 36415 COLL VENOUS BLD VENIPUNCTURE: CPT | Performed by: INTERNAL MEDICINE

## 2018-09-08 PROCEDURE — 80069 RENAL FUNCTION PANEL: CPT | Performed by: INTERNAL MEDICINE

## 2018-09-08 PROCEDURE — 82728 ASSAY OF FERRITIN: CPT | Performed by: INTERNAL MEDICINE

## 2018-09-08 PROCEDURE — 25000128 H RX IP 250 OP 636: Performed by: INTERNAL MEDICINE

## 2018-09-08 PROCEDURE — 63400005 ZZH RX 634: Performed by: INTERNAL MEDICINE

## 2018-09-08 PROCEDURE — 99239 HOSP IP/OBS DSCHRG MGMT >30: CPT | Performed by: INTERNAL MEDICINE

## 2018-09-08 PROCEDURE — 83550 IRON BINDING TEST: CPT | Performed by: INTERNAL MEDICINE

## 2018-09-08 PROCEDURE — 85027 COMPLETE CBC AUTOMATED: CPT | Performed by: INTERNAL MEDICINE

## 2018-09-08 PROCEDURE — 83540 ASSAY OF IRON: CPT | Performed by: INTERNAL MEDICINE

## 2018-09-08 RX ORDER — ALBUMIN, HUMAN INJ 5% 5 %
250 SOLUTION INTRAVENOUS
Status: DISCONTINUED | OUTPATIENT
Start: 2018-09-08 | End: 2018-09-08

## 2018-09-08 RX ORDER — ALBUMIN (HUMAN) 12.5 G/50ML
50 SOLUTION INTRAVENOUS
Status: DISCONTINUED | OUTPATIENT
Start: 2018-09-08 | End: 2018-09-08

## 2018-09-08 RX ADMIN — ASPIRIN 325 MG ORAL TABLET 325 MG: 325 PILL ORAL at 12:57

## 2018-09-08 RX ADMIN — ERYTHROPOIETIN 10000 UNITS: 10000 INJECTION, SOLUTION INTRAVENOUS; SUBCUTANEOUS at 08:55

## 2018-09-08 RX ADMIN — Medication 1 CAPSULE: at 12:58

## 2018-09-08 RX ADMIN — INSULIN ASPART 1 UNITS: 100 INJECTION, SOLUTION INTRAVENOUS; SUBCUTANEOUS at 13:01

## 2018-09-08 RX ADMIN — SODIUM CHLORIDE 250 ML: 9 INJECTION, SOLUTION INTRAVENOUS at 08:42

## 2018-09-08 RX ADMIN — Medication 100 MG: at 00:33

## 2018-09-08 RX ADMIN — OMEPRAZOLE 40 MG: 20 CAPSULE, DELAYED RELEASE ORAL at 12:57

## 2018-09-08 RX ADMIN — Medication: at 08:41

## 2018-09-08 RX ADMIN — Medication 100 MG: at 13:11

## 2018-09-08 RX ADMIN — CARVEDILOL 12.5 MG: 12.5 TABLET, FILM COATED ORAL at 12:58

## 2018-09-08 RX ADMIN — SODIUM CHLORIDE 300 ML: 9 INJECTION, SOLUTION INTRAVENOUS at 08:42

## 2018-09-08 RX ADMIN — GABAPENTIN 300 MG: 300 CAPSULE ORAL at 12:57

## 2018-09-08 RX ADMIN — INSULIN ASPART 2 UNITS: 100 INJECTION, SOLUTION INTRAVENOUS; SUBCUTANEOUS at 08:04

## 2018-09-08 RX ADMIN — HYDRALAZINE HYDROCHLORIDE 25 MG: 25 TABLET ORAL at 12:58

## 2018-09-08 ASSESSMENT — ACTIVITIES OF DAILY LIVING (ADL)
ADLS_ACUITY_SCORE: 13

## 2018-09-08 NOTE — PROGRESS NOTES
D: SWS is following to coordinate DC. The recommendation is TCU. Pt is refusing TCU and would like to go home. Pt has a DC order to return home with OT/PT home care.   A: SW met with pt. Pt was preparing to leave. SW inquired about the home care agency he uses and gave him a list to refresh his memory. Pt indicated that he uses [facility]. SW sent the HC order to [facility].   P: Pt to DC today home with home care and assistance from family.     BRIEN Castrejon  Casual SW [id number]

## 2018-09-08 NOTE — PROGRESS NOTES
Renal Medicine Progress Note            Assessment/Plan:     # ESRD  # HIV  # Anemia  # Hyperphosphatemia  # Hypertension    Plan.   # Next HD treatment is Monday or Tuesday if he remains here. I encouraged him to discuss with Dr. Chavez regarding 4x/week IHD treatment to avoid frequent admission for volume overlaod          Interval History:     He had an uneventful HD treatment. Net 2 liters fluid removed. He says his breathing is fine. No chest pain, N/V. He is going to discuss with Dr. Chavez regarding 4x/week HD treatment.            Medications and Allergies:       - MEDICATION INSTRUCTIONS for Dialysis Patients -   Does not apply See Admin Instructions     abacavir  600 mg Oral QPM     aspirin  325 mg Oral Daily     atorvastatin  40 mg Oral At Bedtime     carvedilol  12.5 mg Oral BID w/meals     clopidogrel (PLAVIX) tablet 75 mg  75 mg Oral QPM     dapsone  100 mg Oral At Bedtime     darunavir  800 mg Oral At Bedtime     dolutegravir  50 mg Oral At Bedtime     gabapentin  300 mg Oral BID     hydrALAZINE  25 mg Oral TID     insulin aspart  1-7 Units Subcutaneous TID AC     insulin aspart  1-5 Units Subcutaneous At Bedtime     irbesartan  300 mg Oral At Bedtime     isosorbide mononitrate  120 mg Oral QPM     mirtazapine  15 mg Oral At Bedtime     NEPHROCAPS  1 capsule Oral Daily     omeprazole  40 mg Oral Daily     ritonavir  100 mg Oral At Bedtime     sodium bicarbonate  25 mEq Intravenous Once     sucroferric oxyhydroxide  500 mg Oral TID w/meals     thiamine  100 mg Oral 3 times daily        Allergies   Allergen Reactions     Calcium Acetate Other (See Comments)     Other reaction(s): Other, see comments  Pain in chest and back  Pain in chest area (sensitive skin)      Diagnostic X-Ray Materials Other (See Comments)     PN: renal failure     Lisinopril      Sulfa Drugs             Physical Exam:   Vitals were reviewed  Heart Rate: 66, Blood pressure 152/69, pulse 82, temperature 97.9  F (36.6  C), temperature  source Oral, resp. rate 20, height 1.829 m (6'), weight 86.9 kg (191 lb 8 oz), SpO2 97 %.    Wt Readings from Last 3 Encounters:   09/07/18 86.9 kg (191 lb 8 oz)   07/14/18 86.6 kg (190 lb 14.7 oz)   04/11/18 85.7 kg (189 lb)       Intake/Output Summary (Last 24 hours) at 09/08/18 1441  Last data filed at 09/08/18 1354   Gross per 24 hour   Intake              800 ml   Output             2100 ml   Net            -1300 ml       GENERAL APPEARANCE: NAD  HEENT:  Eyes/ears/nose/neck grossly normal  RESP: Poor airflow but clear and no wheezes.  CV: RRR, nl S1/S2,   ABDOMEN: obese, soft, NT  EXTREMITIES/SKIN: no rashes/lesions on observed skin; no edema  Neuro: a/o x3         Data:     CBC RESULTS:     Recent Labs  Lab 09/08/18  0557 09/07/18  0547 09/06/18  0622 09/05/18  0653 09/04/18  0531 09/03/18  0848   WBC 4.2 4.1 5.4 4.9 7.4 6.0   RBC 2.59* 2.60* 2.70* 2.83* 2.84* 2.87*   HGB 7.7* 7.8* 8.1* 8.4* 8.5* 8.6*   HCT 25.0* 25.1* 26.2* 27.5* 26.2* 27.7*   * 102* 121* 111* 103* 122*       Basic Metabolic Panel:    Recent Labs  Lab 09/08/18  0557 09/07/18  0547 09/06/18  0622 09/05/18  0653 09/04/18  1447 09/04/18  0531    134 132* 132* 133 131*   POTASSIUM 4.3 3.8 4.7 4.4 4.3 5.5*   CHLORIDE 95 97 95 95 98 92*   CO2 26 26 26 28 27 26   BUN 55* 36* 58* 35* 20 48*   CR 7.63* 5.56* 8.14* 6.17* 4.07* 7.09*   * 120* 144* 127* 96 76   PRABHA 9.0 9.0 8.4* 9.5 9.4 8.6       INRNo lab results found in last 7 days.   Attestation:   I have reviewed today's relevant vital signs, notes, medications, labs and imaging.    Doyle Marcial MD  [facility] - Nephrology  Office phone :[phone]  Pager: [phone]

## 2018-09-08 NOTE — PLAN OF CARE
Problem: Patient Care Overview  Goal: Plan of Care/Patient Progress Review  Discharge Planner OT   Patient plan for discharge: Home  Current status: OT: Pt up in chair, just inished dialysis and lunch.  Planning to go home when family comes and brings some clothes.  Provided handouts for energy conservation as well as a B UE home exercise program with red and green theraband.  Reviewed exercises, pt able to complete one rep of each exercise as an example.  Barriers to return to prior living situation: Stairs, current level of assist for ADLs/transfers, impaired balance, decreased strength/functional activity tolerance  Recommendations for discharge: Home with family assist  Rationale for recommendations: Pt making good progress towards goals, however continues to require assist with ADLs and mobility tasks and is below baseline. Due to pt's dialysis schedule he is not able to go to ARU.     Entered by: Christian Marsh 09/08/2018 2:12 PM       Occupational Therapy Discharge Summary    Reason for therapy discharge:    Discharged to home.    Progress towards therapy goal(s). See goals on Care Plan in [facility] electronic health record for goal details.  Goals partially met.  Barriers to achieving goals:   discharge from facility.    Therapy recommendation(s):    If home services were available pt could benefit from continued therapy to gain strength and endurance in ADLS/IADLS.

## 2018-09-08 NOTE — PLAN OF CARE
Problem: Patient Care Overview  Goal: Plan of Care/Patient Progress Review  Outcome: Improving  Orientation: A&O, forgetful  VS: stable  O2: 97 RA  Tele: discontinued  LS: dim, crackles fine  BS:  Audible and active  : voiding w/o difficulty   Skin: bruised  Activity: SBA  IVF:  Saline locked  Labs: , Cr 5.56, Hgb 7.8  Pain: denies  Consults: neurology, OT, PT, and nephrology following  Plan: discharge today, PT/OT recommending therapy on outpatient basis. Hemodialysis appointment today

## 2018-09-08 NOTE — PLAN OF CARE
Problem: Patient Care Overview  Goal: Plan of Care/Patient Progress Review  Outcome: Improving  VSS, A/OX4, adequate for DC, DC paperwork reviewed w/ pt, pt verbalized understanding, pt discharge from the floor at 1650 to home via personal vehicle.

## 2018-09-08 NOTE — PROGRESS NOTES
D: SWS is following to coordinate DC.   A: SW went to pt's room several times, pt in dialysis.   P: SW will return to discuss DC plans.     BRIEN Castrejon  Casual SW [id number]

## 2018-09-08 NOTE — PLAN OF CARE
Problem: Patient Care Overview  Goal: Plan of Care/Patient Progress Review  Outcome: Improving  A/OX4 but forgetful, VSS except pt slightly hypertensive at 174/82, LS dim w/ fine crackles, assist of 1 w/ walker, creatinine-5.56, hem-7.8, DC tomorrow.

## 2018-09-08 NOTE — PROGRESS NOTES
Potassium   Date Value Ref Range Status   09/08/2018 4.3 3.4 - 5.3 mmol/L Final   ]  Lab Results   Component Value Date    HGB 7.7 09/08/2018     Weight: 86.9 kg (191 lb 8 oz)    DIALYSIS PROCEDURE NOTE    Patient dialyzed for 3.5 hrs. on a 3 K bath with a net fluid removal of  2L.  A BFR of 400 ml/min was obtained via a LAF using 15guage needles and all connections were secured. The patient treatment plan was discussed with Dr. Marcial prior to run.  Total heparin received during the treatment: 0 units. Sites held x 10 min then  pressure drsgs applied.  Meds  given:epogen Complications: none.  Procedure and ESRD teaching done and questions answered. See flowsheet in EPIC for further details.  Hepatitis B labs verified on machine log from previous patient.  Medical aseptic prep utilized  Patient transported via  W/c to the dialysis unit.  Water alarm in place and used.  [facility] consent form signed yes

## 2018-09-08 NOTE — PLAN OF CARE
Problem: Patient Care Overview  Goal: Plan of Care/Patient Progress Review  PT: Pt in dialysis this AM during schedule tx. Per chart pt discharging home today vs ARU due to amount of missed appoints with dialysis. Need to evaluate stairs, need for walker if returning home. Will check back as time allows.

## 2018-09-08 NOTE — PLAN OF CARE
Presentation/Diagnosis: Pt admitted 9/1 due to diaphoresis, nausea, weakness, and speech problems at home.   History: ESRD, HTN, HIV, anemia, and CAD.   Labs/Protocols: K: 4.3, Creatinine: 7.63, Phosphorous: 6.2, Hgb: 7.7, Platelets: 109. QID accuchecks, sugars this shift: 201 and 142.  Vitals: VSS on RA. Pt denies any pain this shift.   Cardiac: WDL.   Respiratory: Diminished lung sounds to LLL.   Neuro: A&Ox4, calm and cooperative with cares.   GI/: Oliguric due to hemodialysis.   Skin: Scattered bruising.   LDAs: 2 PIVs to R FA, SL. AV fistula to L arm.   Diet: Dialysis and high consistent carb diet in place, good appetite.   Activity: SBA with FWW.   Teaching: Pt educated on plan of care.   Plan: Possible discharge to home today.

## 2018-09-08 NOTE — PLAN OF CARE
Problem: Patient Care Overview  Goal: Plan of Care/Patient Progress Review  Discharge Planner PT    Patient plan for discharge: Home  Current status: Pt ambulates 100' SBA with FWW, very fatigued from dialysis this AM. Up/down 6 stairs CGA with BUE on unilat rail. Recommending pt has family help him with stairs. Discussed HHPT rec, pt agreeable. Mod-I bed mobilty with rail.  Barriers to return to prior living situation: Current mobility, activity tolerance  Recommendations for discharge: Home with HHPT  Rationale for recommendations: Pt discharging home today, originally recommended ARU, but pt was not accepted due to number of missed tx due to dialysis. Pt agreeable to HHPT plan.  Discharge Planner PT          Entered by: Randall Heard 09/08/2018 3:41 PM     Physical Therapy Discharge Summary    Reason for therapy discharge:    Discharged to home.    Progress towards therapy goal(s). See goals on Care Plan in [facility] electronic health record for goal details.  Goals partially met.  Barriers to achieving goals:   discharge from facility.    Therapy recommendation(s):    Continued therapy is recommended.  Rationale/Recommendations:  HHPT to progress IND with mobility..

## 2018-09-08 NOTE — DISCHARGE SUMMARY
[facility] Discharge Summary    Donald Raza MRN# [id number]   Age: 68 year old YOB: 1950     Date of Admission:  9/1/2018  Date of Discharge::  9/8/2018  Admitting Physician:  Jovi Tang MD  Discharge Physician:  Adam Cali MD     Home clinic: [facility], [place]          Admission Diagnoses:   Abdominal pain, generalized [R10.84]  Expressive aphasia [R47.01]  Falling [R29.6]          Discharge Diagnosis:   Principal Problem:    Cerebral hypoperfusion, transient and thought primarily to low volume/BP assoc with dialysis  Active Problems:    Type 2 diabetes mellitus (H)    ESRD (end stage renal disease) on dialysis (H)    Anemia due to chronic kidney disease    TIA (transient ischemic attack)    Expressive aphasia    Generalized muscle weakness            Procedures:   CT Head without contrast x 2.   MRI Brain without contrast x 2.   CT Head and neck angiogram x 2.  CT Abd and pelvis without contrast.  CXR.          Medications Prior to Admission:     Prescriptions Prior to Admission   Medication Sig Dispense Refill Last Dose     abacavir (ZIAGEN) 300 MG tablet Take 600 mg by mouth every evening    8/31/2018 at 2200     Acetaminophen (TYLENOL PO) Take by mouth as needed for mild pain or fever   9/1/2018     albuterol (PROAIR HFA/PROVENTIL HFA/VENTOLIN HFA) 108 (90 BASE) MCG/ACT Inhaler Inhale 2 puffs into the lungs every 6 hours as needed for shortness of breath / dyspnea or wheezing 1 Inhaler 1 Past Month     aspirin EC 81 MG EC tablet Take 1 tablet (81 mg) by mouth daily 30 tablet 0 8/31/2018 at 2200     atorvastatin (LIPITOR) 40 MG tablet Take 40 mg by mouth At Bedtime   8/31/2018 at 2200     B COMPLEX-C-FOLIC ACID PO Take 1 tablet by mouth At Bedtime    8/31/2018 at 2200     carvedilol (COREG) 12.5 MG tablet Take 1 tablet (12.5 mg) by mouth 2 times daily (with meals) 60 tablet 0 8/31/2018 at 2200     chlorhexidine (CHLORHEXIDINE) 0.12 % solution Rinse and  gargle 15 ml by mouth twice a day as directed.   8/31/2018 at 2200     CLOPIDOGREL BISULFATE PO Take 75 mg by mouth every evening    8/31/2018 at 2200     dapsone 100 MG tablet Take 100 mg by mouth At Bedtime    8/31/2018 at 2200     Darunavir Ethanolate (PREZISTA PO) Take 800 mg by mouth At Bedtime.   8/31/2018 at 2200     dolutegravir (TIVICAY) 50 MG tablet Take 50 mg by mouth At Bedtime   8/31/2018 at 2200     DOXERCALCIFEROL IV Inject 6 mcg into the vein three times a week (with dialysis)   8/30/2018     epoetin brittni (EPOGEN,PROCRIT) 74951 UNIT/ML injection Inject 11,000 Units Subcutaneous three times a week WITH DIALYSIS   8/30/2018     gabapentin (NEURONTIN) 300 MG capsule Take 300 mg by mouth 2 times daily May take a third dose after dialysis.   8/31/2018     hydrALAZINE (APRESOLINE) 25 MG tablet Take 1 tablet (25 mg) by mouth 3 times daily 90 tablet 0 8/31/2018 at 2200     imiquimod (ALDARA) 5 % cream Apply topically as needed   8/31/2018 at PM     insulin glargine (LANTUS) 100 UNIT/ML injection Inject 25 Units Subcutaneous 2 times daily    8/31/2018 at 2200     Insulin Lispro (HUMALOG PEN SC) Take 15 units TID and an additional 2 units if BG is over 150   8/31/2018 at PM     ipratropium - albuterol 0.5 mg/2.5 mg/3 mL (DUONEB) 0.5-2.5 (3) MG/3ML neb solution Take 1 vial (3 mLs) by nebulization every 6 hours as needed for shortness of breath / dyspnea or wheezing 90 vial 1 Past Month     irbesartan (AVAPRO) 300 MG tablet Take 1 tablet (300 mg) by mouth At Bedtime 30 tablet 0 8/31/2018 at 2200     iron sucrose (VENOFER) 20 MG/ML injection Inject 50 mg into the vein once a week WIth dialysis   8/30/2018     Isosorbide Mononitrate  MG TB24 Take 1 tablet (120 mg) by mouth every evening 30 tablet 11 8/31/2018 at 2200     ketoconazole (NIZORAL) 2 % cream Apply topically 2 times daily as needed   8/31/2018 at PM     MAGNESIUM OXIDE PO Take 400 mg by mouth At Bedtime   8/31/2018 at 2200     mirtazapine  "(REMERON) 15 MG tablet Take 15 mg by mouth At Bedtime   8/31/2018     Nutritional Supplements (NEPRO PO) 1-3 times daily   8/31/2018     omega-3 acid ethyl esters (LOVAZA) 1 G capsule Take 2 g by mouth 2 times daily   8/31/2018 at 2200     omeprazole (PRILOSEC) 40 MG capsule Take 40 mg by mouth daily 1 hour before dinner   8/31/2018 at 2200     ritonavir (NORVIR) 100 MG capsule Take 1 capsule by mouth At Bedtime    8/31/2018 at 2200     sucroferric oxyhydroxide (VELPHORO) 500 MG CHEW chewable tablet Take 500 mg by mouth 3 times daily (with meals)   8/31/2018 at PM     CLONAZEPAM PO Take 0.5 mg by mouth nightly as needed for anxiety (restless legs)   Unknown at 2200     guaiFENesin (MUCINEX) 600 MG 12 hr tablet Take by mouth as needed for congestion   Unknown     nitroglycerin (NITROSTAT) 0.4 MG SL tablet One tablet under the tongue every 5 minutes if needed for chest pain. May repeat every 5 minutes for a maximum of 3 doses in 15 minutes\" 25 tablet 3 Unknown     order for DME Equipment being ordered: Other: 4WW  Treatment Diagnosis: Decreased activity tolerance 1 each 0 Unknown             Discharge Medications:     Current Discharge Medication List      CONTINUE these medications which have NOT CHANGED    Details   abacavir (ZIAGEN) 300 MG tablet Take 600 mg by mouth every evening       Acetaminophen (TYLENOL PO) Take by mouth as needed for mild pain or fever      albuterol (PROAIR HFA/PROVENTIL HFA/VENTOLIN HFA) 108 (90 BASE) MCG/ACT Inhaler Inhale 2 puffs into the lungs every 6 hours as needed for shortness of breath / dyspnea or wheezing  Qty: 1 Inhaler, Refills: 1    Associated Diagnoses: Cough      aspirin EC 81 MG EC tablet Take 1 tablet (81 mg) by mouth daily  Qty: 30 tablet, Refills: 0    Associated Diagnoses: Coronary artery disease, angina presence unspecified, unspecified vessel or lesion type, unspecified whether native or transplanted heart      atorvastatin (LIPITOR) 40 MG tablet Take 40 mg by mouth " At Bedtime      B COMPLEX-C-FOLIC ACID PO Take 1 tablet by mouth At Bedtime       carvedilol (COREG) 12.5 MG tablet Take 1 tablet (12.5 mg) by mouth 2 times daily (with meals)  Qty: 60 tablet, Refills: 0    Associated Diagnoses: Hypertension secondary to other renal disorders      chlorhexidine (CHLORHEXIDINE) 0.12 % solution Rinse and gargle 15 ml by mouth twice a day as directed.      CLOPIDOGREL BISULFATE PO Take 75 mg by mouth every evening       dapsone 100 MG tablet Take 100 mg by mouth At Bedtime       Darunavir Ethanolate (PREZISTA PO) Take 800 mg by mouth At Bedtime.      dolutegravir (TIVICAY) 50 MG tablet Take 50 mg by mouth At Bedtime      DOXERCALCIFEROL IV Inject 6 mcg into the vein three times a week (with dialysis)      epoetin brittni (EPOGEN,PROCRIT) 05593 UNIT/ML injection Inject 11,000 Units Subcutaneous three times a week WITH DIALYSIS      gabapentin (NEURONTIN) 300 MG capsule Take 300 mg by mouth 2 times daily May take a third dose after dialysis.      hydrALAZINE (APRESOLINE) 25 MG tablet Take 1 tablet (25 mg) by mouth 3 times daily  Qty: 90 tablet, Refills: 0    Associated Diagnoses: Hypertension secondary to other renal disorders      imiquimod (ALDARA) 5 % cream Apply topically as needed      insulin glargine (LANTUS) 100 UNIT/ML injection Inject 25 Units Subcutaneous 2 times daily       Insulin Lispro (HUMALOG PEN SC) Take 15 units TID and an additional 2 units if BG is over 150      ipratropium - albuterol 0.5 mg/2.5 mg/3 mL (DUONEB) 0.5-2.5 (3) MG/3ML neb solution Take 1 vial (3 mLs) by nebulization every 6 hours as needed for shortness of breath / dyspnea or wheezing  Qty: 90 vial, Refills: 1    Associated Diagnoses: Acute respiratory failure with hypoxia (H)      irbesartan (AVAPRO) 300 MG tablet Take 1 tablet (300 mg) by mouth At Bedtime  Qty: 30 tablet, Refills: 0    Associated Diagnoses: Hypertension secondary to other renal disorders      iron sucrose (VENOFER) 20 MG/ML injection  "Inject 50 mg into the vein once a week WIth dialysis      Isosorbide Mononitrate  MG TB24 Take 1 tablet (120 mg) by mouth every evening  Qty: 30 tablet, Refills: 11    Associated Diagnoses: Coronary artery disease involving native heart, angina presence unspecified, unspecified vessel or lesion type; Hypertension secondary to other renal disorders      ketoconazole (NIZORAL) 2 % cream Apply topically 2 times daily as needed      MAGNESIUM OXIDE PO Take 400 mg by mouth At Bedtime      mirtazapine (REMERON) 15 MG tablet Take 15 mg by mouth At Bedtime      Nutritional Supplements (NEPRO PO) 1-3 times daily      omega-3 acid ethyl esters (LOVAZA) 1 G capsule Take 2 g by mouth 2 times daily      omeprazole (PRILOSEC) 40 MG capsule Take 40 mg by mouth daily 1 hour before dinner      ritonavir (NORVIR) 100 MG capsule Take 1 capsule by mouth At Bedtime       sucroferric oxyhydroxide (VELPHORO) 500 MG CHEW chewable tablet Take 500 mg by mouth 3 times daily (with meals)      CLONAZEPAM PO Take 0.5 mg by mouth nightly as needed for anxiety (restless legs)      guaiFENesin (MUCINEX) 600 MG 12 hr tablet Take by mouth as needed for congestion      nitroglycerin (NITROSTAT) 0.4 MG SL tablet One tablet under the tongue every 5 minutes if needed for chest pain. May repeat every 5 minutes for a maximum of 3 doses in 15 minutes\"  Qty: 25 tablet, Refills: 3    Associated Diagnoses: Coronary artery disease due to lipid rich plaque; Status post coronary angioplasty      order for DME Equipment being ordered: Other: 4WW  Treatment Diagnosis: Decreased activity tolerance  Qty: 1 each, Refills: 0    Associated Diagnoses: SOB (shortness of breath); Generalized muscle weakness                   Consultations:   Consultation during this admission received from nephrology and neurology.          Hospital Course:   Donald Raza is a complex 68 year old man who came to attention on 9/1/2018 following abdominal pain, nausea followed by " "headache and expressive aphasia that had onset while he was midway through hemodialysis.      He is a very complex man well known to the [facility] with prior medical history significant for type 2 diabetes, hypertension, end-stage renal disease now requiring hemodialysis, coronary artery disease and HIV infection.  He is generally remarkably stable and has not been hospitalized since a short admission in the beginning of July when he had symptomatic anemia.    The patient was noted to be alert and anxious in the emergency department with documentation of expressive aphasia.  He had no focal neurologic deficits otherwise.  By the time the patient was admitted to medical bed, his episode of slurred speech and word finding difficulty had fully resolved.      While in the emergency department, Mr. Raza underwent usual stat stroke evaluation.  That showed no acute abnormality and he was admitted to medical bed.  Stroke neurology from [facility] was consulted and ultimately felt that the patient had not had an acute cerebrovascular accident.      At the recommendation of neurology, the patient was continued on aspirin and switched to Plavix.  The possibility of \"dialysis disequilibrium syndrome\" was excluded on clinical grounds.  Consideration was given to more frequent, lower volume and lower flow dialysis in order to minimize cerebral hypoperfusion.  Evidently nephrology agreed with the recommendation that is the plan going forward: The patient will be dialyzed Monday Tuesday Thursday and Saturday.  Apparently Dr. Chaves was planning on talking with the patient's primary nephrologist, Dr. Chavez in regard to this regimen change.    In addition, Dr. Haro requested that the patient undergo 30 day monitoring for atrial fibrillation.     /69  Pulse 82  Temp 97.9  F (36.6  C) (Oral)  Resp 20  Ht 1.829 m (6')  Wt 86.9 kg (191 lb 8 oz)  SpO2 97%  BMI 25.97 kg/m2   On the date of discharge, " Mr. Raza underwent dialysis and is eager for discharge home.  He is noted to be below his baseline level of activity and requires assistance with activities of daily living as well as transfers due to impaired balance and decreased strength.  Occupational physical therapy of recommended ongoing outpatient therapies.  Patient is alert, coherent and in no apparent distress.  He is articulate and does not seem to have any difficulty with expressing his thoughts.  Chest: Clear  Heart: Regular rate and rhythm with a 2/6 systolic murmur.  Abdomen: Soft, nondistended, nontender.  Extremities: No remarkable edema.  Left upper extremity AV fistula.          Discharge Instructions and Follow-Up:   Discharge diet:  dialysis diet.   Discharge activity: Activity as tolerated at this time patient is advised to use a walker.   Discharge follow-up: With HD on Monday, Tuesday, Thursday, Saturday           Discharge Disposition:   Discharged to home      Attestation:  I have reviewed today's vital signs, notes, medications, labs and imaging.  Total time: 45 minutes    Adam Cali MD

## 2018-09-19 ENCOUNTER — HOSPITAL ENCOUNTER (OUTPATIENT)
Dept: CARDIOLOGY | Facility: CLINIC | Age: 68
Discharge: HOME | End: 2018-09-19
Attending: INTERNAL MEDICINE | Admitting: INTERNAL MEDICINE
Payer: MEDICARE

## 2018-09-19 DIAGNOSIS — G45.9 TRANSIENT CEREBRAL ISCHEMIA, UNSPECIFIED TYPE: ICD-10-CM

## 2018-09-19 DIAGNOSIS — R47.01 EXPRESSIVE APHASIA: ICD-10-CM

## 2018-09-19 PROCEDURE — 0298T ZZC EXT ECG > 48HR TO 21 DAY REVIEW AND INTERPRETATN: CPT | Performed by: INTERNAL MEDICINE

## 2018-09-19 PROCEDURE — 0296T ZIO PATCH HOLTER: CPT | Performed by: INTERNAL MEDICINE

## 2018-10-12 ENCOUNTER — TELEPHONE (OUTPATIENT)
Dept: CARDIOLOGY | Facility: CLINIC | Age: 68
End: 2018-10-12

## 2018-12-05 ENCOUNTER — APPOINTMENT (OUTPATIENT)
Dept: CT IMAGING | Facility: CLINIC | Age: 68
DRG: 974 | End: 2018-12-05
Attending: EMERGENCY MEDICINE
Payer: MEDICARE

## 2018-12-05 ENCOUNTER — HOSPITAL ENCOUNTER (INPATIENT)
Facility: CLINIC | Age: 68
LOS: 8 days | Discharge: HOME HEALTH | DRG: 974 | End: 2018-12-13
Attending: EMERGENCY MEDICINE | Admitting: INTERNAL MEDICINE
Payer: MEDICARE

## 2018-12-05 ENCOUNTER — APPOINTMENT (OUTPATIENT)
Dept: GENERAL RADIOLOGY | Facility: CLINIC | Age: 68
DRG: 974 | End: 2018-12-05
Attending: EMERGENCY MEDICINE
Payer: MEDICARE

## 2018-12-05 DIAGNOSIS — I15.1 HYPERTENSION SECONDARY TO OTHER RENAL DISORDERS: Chronic | ICD-10-CM

## 2018-12-05 DIAGNOSIS — R50.9 FEBRILE ILLNESS: ICD-10-CM

## 2018-12-05 DIAGNOSIS — N18.6 END STAGE RENAL DISEASE ON DIALYSIS (H): ICD-10-CM

## 2018-12-05 DIAGNOSIS — Z99.2 END STAGE RENAL DISEASE ON DIALYSIS (H): ICD-10-CM

## 2018-12-05 DIAGNOSIS — M62.81 GENERALIZED MUSCLE WEAKNESS: ICD-10-CM

## 2018-12-05 DIAGNOSIS — J18.9 PNEUMONIA DUE TO INFECTIOUS ORGANISM, UNSPECIFIED LATERALITY, UNSPECIFIED PART OF LUNG: Primary | ICD-10-CM

## 2018-12-05 LAB
ALBUMIN SERPL-MCNC: 3.8 G/DL (ref 3.4–5)
ALP SERPL-CCNC: 139 U/L (ref 40–150)
ALT SERPL W P-5'-P-CCNC: 13 U/L (ref 0–70)
ANION GAP SERPL CALCULATED.3IONS-SCNC: 11 MMOL/L (ref 3–14)
APTT PPP: 20 SEC (ref 22–37)
AST SERPL W P-5'-P-CCNC: 15 U/L (ref 0–45)
BASOPHILS # BLD AUTO: 0 10E9/L (ref 0–0.2)
BASOPHILS NFR BLD AUTO: 0.3 %
BILIRUB SERPL-MCNC: 0.6 MG/DL (ref 0.2–1.3)
BUN SERPL-MCNC: 61 MG/DL (ref 7–30)
CALCIUM SERPL-MCNC: 9.4 MG/DL (ref 8.5–10.1)
CHLORIDE SERPL-SCNC: 100 MMOL/L (ref 94–109)
CO2 SERPL-SCNC: 26 MMOL/L (ref 20–32)
CREAT SERPL-MCNC: 8.49 MG/DL (ref 0.66–1.25)
DIFFERENTIAL METHOD BLD: ABNORMAL
EOSINOPHIL # BLD AUTO: 0.1 10E9/L (ref 0–0.7)
EOSINOPHIL NFR BLD AUTO: 1.1 %
ERYTHROCYTE [DISTWIDTH] IN BLOOD BY AUTOMATED COUNT: 18.1 % (ref 10–15)
FLUAV+FLUBV AG SPEC QL: NEGATIVE
FLUAV+FLUBV AG SPEC QL: NEGATIVE
GFR SERPL CREATININE-BSD FRML MDRD: 6 ML/MIN/1.7M2
GLUCOSE SERPL-MCNC: 195 MG/DL (ref 70–99)
HCT VFR BLD AUTO: 28.2 % (ref 40–53)
HGB BLD-MCNC: 8.6 G/DL (ref 13.3–17.7)
IMM GRANULOCYTES # BLD: 0 10E9/L (ref 0–0.4)
IMM GRANULOCYTES NFR BLD: 0.3 %
INR PPP: 1.03 (ref 0.86–1.14)
LACTATE BLD-SCNC: 2.2 MMOL/L (ref 0.7–2)
LYMPHOCYTES # BLD AUTO: 0.6 10E9/L (ref 0.8–5.3)
LYMPHOCYTES NFR BLD AUTO: 9.6 %
MCH RBC QN AUTO: 28.4 PG (ref 26.5–33)
MCHC RBC AUTO-ENTMCNC: 30.5 G/DL (ref 31.5–36.5)
MCV RBC AUTO: 93 FL (ref 78–100)
MONOCYTES # BLD AUTO: 0.4 10E9/L (ref 0–1.3)
MONOCYTES NFR BLD AUTO: 6.6 %
NEUTROPHILS # BLD AUTO: 5.2 10E9/L (ref 1.6–8.3)
NEUTROPHILS NFR BLD AUTO: 82.1 %
NRBC # BLD AUTO: 0 10*3/UL
NRBC BLD AUTO-RTO: 0 /100
PLATELET # BLD AUTO: 128 10E9/L (ref 150–450)
POTASSIUM SERPL-SCNC: 4.4 MMOL/L (ref 3.4–5.3)
PROT SERPL-MCNC: 8.1 G/DL (ref 6.8–8.8)
RBC # BLD AUTO: 3.03 10E12/L (ref 4.4–5.9)
SODIUM SERPL-SCNC: 137 MMOL/L (ref 133–144)
SPECIMEN SOURCE: NORMAL
WBC # BLD AUTO: 6.4 10E9/L (ref 4–11)

## 2018-12-05 PROCEDURE — 96365 THER/PROPH/DIAG IV INF INIT: CPT

## 2018-12-05 PROCEDURE — 71046 X-RAY EXAM CHEST 2 VIEWS: CPT

## 2018-12-05 PROCEDURE — 85730 THROMBOPLASTIN TIME PARTIAL: CPT | Performed by: EMERGENCY MEDICINE

## 2018-12-05 PROCEDURE — 80053 COMPREHEN METABOLIC PANEL: CPT | Performed by: EMERGENCY MEDICINE

## 2018-12-05 PROCEDURE — 96367 TX/PROPH/DG ADDL SEQ IV INF: CPT

## 2018-12-05 PROCEDURE — 85610 PROTHROMBIN TIME: CPT | Performed by: EMERGENCY MEDICINE

## 2018-12-05 PROCEDURE — 93005 ELECTROCARDIOGRAM TRACING: CPT

## 2018-12-05 PROCEDURE — 87804 INFLUENZA ASSAY W/OPTIC: CPT | Performed by: EMERGENCY MEDICINE

## 2018-12-05 PROCEDURE — 25000132 ZZH RX MED GY IP 250 OP 250 PS 637: Mod: GY | Performed by: EMERGENCY MEDICINE

## 2018-12-05 PROCEDURE — A9270 NON-COVERED ITEM OR SERVICE: HCPCS | Mod: GY | Performed by: EMERGENCY MEDICINE

## 2018-12-05 PROCEDURE — 99223 1ST HOSP IP/OBS HIGH 75: CPT | Mod: AI | Performed by: INTERNAL MEDICINE

## 2018-12-05 PROCEDURE — 36415 COLL VENOUS BLD VENIPUNCTURE: CPT | Performed by: EMERGENCY MEDICINE

## 2018-12-05 PROCEDURE — 25000128 H RX IP 250 OP 636: Performed by: EMERGENCY MEDICINE

## 2018-12-05 PROCEDURE — 87040 BLOOD CULTURE FOR BACTERIA: CPT | Performed by: EMERGENCY MEDICINE

## 2018-12-05 PROCEDURE — 85025 COMPLETE CBC W/AUTO DIFF WBC: CPT | Performed by: EMERGENCY MEDICINE

## 2018-12-05 PROCEDURE — 83605 ASSAY OF LACTIC ACID: CPT | Performed by: EMERGENCY MEDICINE

## 2018-12-05 PROCEDURE — 25000131 ZZH RX MED GY IP 250 OP 636 PS 637: Performed by: EMERGENCY MEDICINE

## 2018-12-05 PROCEDURE — 12000007 ZZH R&B INTERMEDIATE

## 2018-12-05 PROCEDURE — 96366 THER/PROPH/DIAG IV INF ADDON: CPT

## 2018-12-05 PROCEDURE — A9270 NON-COVERED ITEM OR SERVICE: HCPCS | Performed by: EMERGENCY MEDICINE

## 2018-12-05 PROCEDURE — 71250 CT THORAX DX C-: CPT

## 2018-12-05 PROCEDURE — 99285 EMERGENCY DEPT VISIT HI MDM: CPT | Mod: 25

## 2018-12-05 RX ORDER — CEFAZOLIN SODIUM 1 G/50ML
2000 SOLUTION INTRAVENOUS ONCE
Status: COMPLETED | OUTPATIENT
Start: 2018-12-05 | End: 2018-12-06

## 2018-12-05 RX ORDER — METOCLOPRAMIDE 5 MG/1
2.5 TABLET ORAL ONCE
Status: COMPLETED | OUTPATIENT
Start: 2018-12-05 | End: 2018-12-05

## 2018-12-05 RX ORDER — ACETAMINOPHEN 500 MG
1000 TABLET ORAL ONCE
Status: DISCONTINUED | OUTPATIENT
Start: 2018-12-05 | End: 2018-12-05

## 2018-12-05 RX ORDER — ACETAMINOPHEN 500 MG
1000 TABLET ORAL ONCE
Status: COMPLETED | OUTPATIENT
Start: 2018-12-05 | End: 2018-12-05

## 2018-12-05 RX ADMIN — TAZOBACTAM SODIUM AND PIPERACILLIN SODIUM 2.25 G: 250; 2 INJECTION, SOLUTION INTRAVENOUS at 22:27

## 2018-12-05 RX ADMIN — ACETAMINOPHEN 1000 MG: 500 TABLET, FILM COATED ORAL at 21:06

## 2018-12-05 RX ADMIN — METOCLOPRAMIDE HYDROCHLORIDE 2.5 MG: 5 TABLET ORAL at 23:31

## 2018-12-05 RX ADMIN — VANCOMYCIN HYDROCHLORIDE 2000 MG: 1 INJECTION, POWDER, LYOPHILIZED, FOR SOLUTION INTRAVENOUS at 22:54

## 2018-12-05 RX ADMIN — SODIUM CHLORIDE 500 ML: 9 INJECTION, SOLUTION INTRAVENOUS at 22:00

## 2018-12-05 ASSESSMENT — ENCOUNTER SYMPTOMS
HEADACHES: 1
FATIGUE: 1
FEVER: 1
WEAKNESS: 1
COUGH: 1

## 2018-12-05 NOTE — LETTER
Key Recommendations:  CTS identifies pt as high risk due to elevated MUSHTAQ. Pt currently admitted for Sepsis, this is his 3rd admission in 6 months. Per chart review, resides at home w/ wife, his dtr is PCA 9 hrs/day M-F. Pt attends HD T,Th,Sat at [facility]. Per notes, his baseline mobility is ambulating w/ cane. ***He may require PT eval while here. *** He has had [facility] in the past but was discharged from them in October 2018. Plan on discharge. ***    Radha Mayfield    AVS/Discharge Summary is the source of truth; this is a helpful guide for improved communication of patient story

## 2018-12-05 NOTE — LETTER
Transition Communication Hand-off for Care Transitions to Next Level of Care Provider    Hand-off for Care Transitions to Next Level of Care Provider  Name: Donald Raza  : 1950  MRN #: [id number]  Reason for Hospitalization:  Generalized muscle weakness [M62.81]  End stage renal disease on dialysis (H) [N18.6, Z99.2]  Febrile illness [R50.9]  Admit Date/Time: 2018  8:45 PM  Discharge Date: 2018    Reason for Communication Hand-off Referral: Fragility  Other  Sepsis    Discharge Plan:  Discharged to: Home with homecare                   Patient agreeable to post-hospital support suggestions:  Yes    Patient is on new medications:   Yes    MTM follow up recommended: Yes    Tel-Assurance program:  Already enrolled in a TA program    Patient will receive  HOME CARE  at:  discharge through [facility].     Concern for non-adherence with plan of care:  No    Follow-up specialty is recommended: Yes. Follow up with [facility] for OT, PT & RN services. Follow up with Cardiology Clinic as recommended. Also, also f/u with MTM referral as ordered.     Follow-up plan:  No future appointments.    Any outstanding tests or procedures:  No.       Referrals     Future Labs/Procedures    Home care nursing referral     Comments:    RN skilled nursing visit. RN to assess vital signs and weight, patients ability to take and record daily blood pressure, temp and weight, pain level and activity tolerance, hydration, nutrition and bowel status and home safety.      Your provider has ordered home care nursing services. If you have not been contacted within 2 days of your discharge please call the inpatient department phone number at [phone] .    Home Care OT Referral for Hospital Discharge     Comments:    OT to eval and treat    Your provider has ordered home care - occupational therapy. If you have not been contacted within 2 days of your discharge please call the department phone number listed on  the top of this document.    Home Care PT Referral for Hospital Discharge     Comments:    PT to eval and treat    Your provider has ordered home care - physical therapy. If you have not been contacted within 2 days of your discharge please call the department phone number listed on the top of this document.    Medication Therapy Management Referral     Comments:    MTM referral reason            Patient had a hospital or ED visit in last 6 months and has more than 10   PTA or Discharge medications    Patient has 5 PTA or Discharge Medications AND one of the following   diagnoses: DM,HF,COPD,AMI DX,PULM HTN       This service is designed to help you get the most from your medications.  A specially trained pharmacist will work closely with you and your doctors  to solve any problems related to your medications and to help you get the   best results from taking them.      The Medication Therapy Management staff will call you to schedule an appointment.          Key Recommendations:   CTS identifies pt as high risk due to elevated MUSHTAQ. Pt currently admitted for Sepsis, this is his 3rd admission in 6 months. Per chart review, resides at home w/ wife, his dtr is PCA 9 hrs/day M-F. Pt [facility]Sat [facility]. Per notes, his baseline mobility is ambulating w/ cane. He had a PT eval while here. He has had [facility] in the past but was discharged from them in October 2018. Plan on discharge is to resume [facility] for OT, PT & RN services.    Radha Mayfield    Communicated handoff via EPIC Comm Mgt to Dr. Aida Thomas's CC at [phone].     Sent by Ruby Hinojosa, RN, BSN, CTS  [facility]  [phone]    AVS/Discharge Summary is the source of truth; this is a helpful guide for improved communication of patient story

## 2018-12-06 PROBLEM — A41.9 SEPSIS (H): Status: ACTIVE | Noted: 2018-12-06

## 2018-12-06 LAB
ALBUMIN UR-MCNC: >499 MG/DL
ANION GAP SERPL CALCULATED.3IONS-SCNC: 11 MMOL/L (ref 3–14)
APPEARANCE UR: CLEAR
BASOPHILS # BLD AUTO: 0 10E9/L (ref 0–0.2)
BASOPHILS NFR BLD AUTO: 0.6 %
BILIRUB UR QL STRIP: NEGATIVE
BUN SERPL-MCNC: 69 MG/DL (ref 7–30)
CALCIUM SERPL-MCNC: 8.9 MG/DL (ref 8.5–10.1)
CHLORIDE SERPL-SCNC: 103 MMOL/L (ref 94–109)
CO2 SERPL-SCNC: 24 MMOL/L (ref 20–32)
COLOR UR AUTO: YELLOW
CREAT SERPL-MCNC: 9.63 MG/DL (ref 0.66–1.25)
DIFFERENTIAL METHOD BLD: ABNORMAL
EOSINOPHIL # BLD AUTO: 0.1 10E9/L (ref 0–0.7)
EOSINOPHIL NFR BLD AUTO: 1.8 %
ERYTHROCYTE [DISTWIDTH] IN BLOOD BY AUTOMATED COUNT: 17.4 % (ref 10–15)
GFR SERPL CREATININE-BSD FRML MDRD: 5 ML/MIN/1.7M2
GLUCOSE BLDC GLUCOMTR-MCNC: 104 MG/DL (ref 70–99)
GLUCOSE BLDC GLUCOMTR-MCNC: 119 MG/DL (ref 70–99)
GLUCOSE BLDC GLUCOMTR-MCNC: 152 MG/DL (ref 70–99)
GLUCOSE BLDC GLUCOMTR-MCNC: 154 MG/DL (ref 70–99)
GLUCOSE BLDC GLUCOMTR-MCNC: 167 MG/DL (ref 70–99)
GLUCOSE BLDC GLUCOMTR-MCNC: 192 MG/DL (ref 70–99)
GLUCOSE SERPL-MCNC: 152 MG/DL (ref 70–99)
GLUCOSE UR STRIP-MCNC: >499 MG/DL
HBA1C MFR BLD: 4.7 % (ref 0–5.6)
HCT VFR BLD AUTO: 26.2 % (ref 40–53)
HGB BLD-MCNC: 8.1 G/DL (ref 13.3–17.7)
HGB UR QL STRIP: NEGATIVE
IMM GRANULOCYTES # BLD: 0 10E9/L (ref 0–0.4)
IMM GRANULOCYTES NFR BLD: 0.2 %
INTERPRETATION ECG - MUSE: NORMAL
INTERPRETATION ECG - MUSE: NORMAL
KETONES UR STRIP-MCNC: NEGATIVE MG/DL
LACTATE BLD-SCNC: 0.9 MMOL/L (ref 0.7–2)
LACTATE BLD-SCNC: 1 MMOL/L (ref 0.7–2)
LACTATE BLD-SCNC: 1 MMOL/L (ref 0.7–2)
LEUKOCYTE ESTERASE UR QL STRIP: NEGATIVE
LYMPHOCYTES # BLD AUTO: 0.8 10E9/L (ref 0.8–5.3)
LYMPHOCYTES NFR BLD AUTO: 11.9 %
MCH RBC QN AUTO: 28.5 PG (ref 26.5–33)
MCHC RBC AUTO-ENTMCNC: 30.9 G/DL (ref 31.5–36.5)
MCV RBC AUTO: 92 FL (ref 78–100)
MONOCYTES # BLD AUTO: 0.4 10E9/L (ref 0–1.3)
MONOCYTES NFR BLD AUTO: 6.6 %
MUCOUS THREADS #/AREA URNS LPF: PRESENT /LPF
NEUTROPHILS # BLD AUTO: 5.2 10E9/L (ref 1.6–8.3)
NEUTROPHILS NFR BLD AUTO: 78.9 %
NITRATE UR QL: NEGATIVE
NRBC # BLD AUTO: 0 10*3/UL
NRBC BLD AUTO-RTO: 0 /100
PH UR STRIP: 9 PH (ref 5–7)
PLATELET # BLD AUTO: 96 10E9/L (ref 150–450)
POTASSIUM SERPL-SCNC: 4.4 MMOL/L (ref 3.4–5.3)
PROCALCITONIN SERPL-MCNC: 0.53 NG/ML
RBC # BLD AUTO: 2.84 10E12/L (ref 4.4–5.9)
RBC #/AREA URNS AUTO: 1 /HPF (ref 0–2)
SODIUM SERPL-SCNC: 138 MMOL/L (ref 133–144)
SOURCE: ABNORMAL
SP GR UR STRIP: 1.01 (ref 1–1.03)
SQUAMOUS #/AREA URNS AUTO: <1 /HPF (ref 0–1)
TRANS CELLS #/AREA URNS HPF: <1 /HPF (ref 0–1)
UROBILINOGEN UR STRIP-MCNC: 0 MG/DL (ref 0–2)
WBC # BLD AUTO: 6.6 10E9/L (ref 4–11)
WBC #/AREA URNS AUTO: 3 /HPF (ref 0–5)

## 2018-12-06 PROCEDURE — 87088 URINE BACTERIA CULTURE: CPT | Performed by: INTERNAL MEDICINE

## 2018-12-06 PROCEDURE — 63400005 ZZH RX 634: Performed by: INTERNAL MEDICINE

## 2018-12-06 PROCEDURE — 25000128 H RX IP 250 OP 636: Performed by: INTERNAL MEDICINE

## 2018-12-06 PROCEDURE — 00000146 ZZHCL STATISTIC GLUCOSE BY METER IP

## 2018-12-06 PROCEDURE — 25000131 ZZH RX MED GY IP 250 OP 636 PS 637: Mod: GY | Performed by: INTERNAL MEDICINE

## 2018-12-06 PROCEDURE — 80048 BASIC METABOLIC PNL TOTAL CA: CPT | Performed by: INTERNAL MEDICINE

## 2018-12-06 PROCEDURE — 83605 ASSAY OF LACTIC ACID: CPT | Performed by: INTERNAL MEDICINE

## 2018-12-06 PROCEDURE — A9270 NON-COVERED ITEM OR SERVICE: HCPCS | Mod: GY | Performed by: INTERNAL MEDICINE

## 2018-12-06 PROCEDURE — 36415 COLL VENOUS BLD VENIPUNCTURE: CPT | Performed by: INTERNAL MEDICINE

## 2018-12-06 PROCEDURE — 99233 SBSQ HOSP IP/OBS HIGH 50: CPT | Performed by: INTERNAL MEDICINE

## 2018-12-06 PROCEDURE — 85025 COMPLETE CBC W/AUTO DIFF WBC: CPT | Performed by: INTERNAL MEDICINE

## 2018-12-06 PROCEDURE — 12000007 ZZH R&B INTERMEDIATE

## 2018-12-06 PROCEDURE — 87186 SC STD MICRODIL/AGAR DIL: CPT | Performed by: INTERNAL MEDICINE

## 2018-12-06 PROCEDURE — 40000275 ZZH STATISTIC RCP TIME EA 10 MIN

## 2018-12-06 PROCEDURE — 81001 URINALYSIS AUTO W/SCOPE: CPT | Performed by: INTERNAL MEDICINE

## 2018-12-06 PROCEDURE — 83605 ASSAY OF LACTIC ACID: CPT | Performed by: EMERGENCY MEDICINE

## 2018-12-06 PROCEDURE — 25000132 ZZH RX MED GY IP 250 OP 250 PS 637: Mod: GY | Performed by: INTERNAL MEDICINE

## 2018-12-06 PROCEDURE — 90937 HEMODIALYSIS REPEATED EVAL: CPT

## 2018-12-06 PROCEDURE — 93010 ELECTROCARDIOGRAM REPORT: CPT | Performed by: INTERNAL MEDICINE

## 2018-12-06 PROCEDURE — 83036 HEMOGLOBIN GLYCOSYLATED A1C: CPT | Performed by: INTERNAL MEDICINE

## 2018-12-06 PROCEDURE — 87633 RESP VIRUS 12-25 TARGETS: CPT | Performed by: INTERNAL MEDICINE

## 2018-12-06 PROCEDURE — 93005 ELECTROCARDIOGRAM TRACING: CPT

## 2018-12-06 PROCEDURE — 87086 URINE CULTURE/COLONY COUNT: CPT | Performed by: INTERNAL MEDICINE

## 2018-12-06 PROCEDURE — 84145 PROCALCITONIN (PCT): CPT | Performed by: INTERNAL MEDICINE

## 2018-12-06 RX ORDER — ISOSORBIDE MONONITRATE 60 MG/1
120 TABLET, EXTENDED RELEASE ORAL EVERY EVENING
Status: DISCONTINUED | OUTPATIENT
Start: 2018-12-06 | End: 2018-12-13 | Stop reason: HOSPADM

## 2018-12-06 RX ORDER — DOXERCALCIFEROL 4 UG/2ML
1.5 INJECTION INTRAVENOUS
Status: COMPLETED | OUTPATIENT
Start: 2018-12-06 | End: 2018-12-06

## 2018-12-06 RX ORDER — ATORVASTATIN CALCIUM 40 MG/1
40 TABLET, FILM COATED ORAL AT BEDTIME
Status: DISCONTINUED | OUTPATIENT
Start: 2018-12-06 | End: 2018-12-13 | Stop reason: HOSPADM

## 2018-12-06 RX ORDER — PANTOPRAZOLE SODIUM 40 MG/1
40 TABLET, DELAYED RELEASE ORAL DAILY
Status: DISCONTINUED | OUTPATIENT
Start: 2018-12-06 | End: 2018-12-13 | Stop reason: HOSPADM

## 2018-12-06 RX ORDER — NITROGLYCERIN 0.4 MG/1
0.4 TABLET SUBLINGUAL EVERY 5 MIN PRN
Status: DISCONTINUED | OUTPATIENT
Start: 2018-12-06 | End: 2018-12-13 | Stop reason: HOSPADM

## 2018-12-06 RX ORDER — HYDRALAZINE HYDROCHLORIDE 25 MG/1
25 TABLET, FILM COATED ORAL 3 TIMES DAILY
Status: DISCONTINUED | OUTPATIENT
Start: 2018-12-06 | End: 2018-12-13 | Stop reason: HOSPADM

## 2018-12-06 RX ORDER — CLOPIDOGREL BISULFATE 75 MG/1
75 TABLET ORAL EVERY EVENING
Status: DISCONTINUED | OUTPATIENT
Start: 2018-12-06 | End: 2018-12-13 | Stop reason: HOSPADM

## 2018-12-06 RX ORDER — ALBUMIN (HUMAN) 12.5 G/50ML
50 SOLUTION INTRAVENOUS
Status: DISCONTINUED | OUTPATIENT
Start: 2018-12-06 | End: 2018-12-06

## 2018-12-06 RX ORDER — GABAPENTIN 300 MG/1
300 CAPSULE ORAL 2 TIMES DAILY
Status: DISCONTINUED | OUTPATIENT
Start: 2018-12-06 | End: 2018-12-13 | Stop reason: HOSPADM

## 2018-12-06 RX ORDER — ABACAVIR 300 MG/1
600 TABLET ORAL EVERY EVENING
Status: DISCONTINUED | OUTPATIENT
Start: 2018-12-06 | End: 2018-12-13 | Stop reason: HOSPADM

## 2018-12-06 RX ORDER — ONDANSETRON 4 MG/1
4 TABLET, ORALLY DISINTEGRATING ORAL EVERY 6 HOURS PRN
Status: DISCONTINUED | OUTPATIENT
Start: 2018-12-06 | End: 2018-12-13 | Stop reason: HOSPADM

## 2018-12-06 RX ORDER — RITONAVIR 100 MG/1
100 TABLET ORAL AT BEDTIME
Status: DISCONTINUED | OUTPATIENT
Start: 2018-12-06 | End: 2018-12-13 | Stop reason: HOSPADM

## 2018-12-06 RX ORDER — DEXTROSE MONOHYDRATE 25 G/50ML
25-50 INJECTION, SOLUTION INTRAVENOUS
Status: DISCONTINUED | OUTPATIENT
Start: 2018-12-06 | End: 2018-12-13 | Stop reason: HOSPADM

## 2018-12-06 RX ORDER — PANTOPRAZOLE SODIUM 40 MG/1
40 TABLET, DELAYED RELEASE ORAL DAILY
COMMUNITY

## 2018-12-06 RX ORDER — NICOTINE POLACRILEX 4 MG
15-30 LOZENGE BUCCAL
Status: DISCONTINUED | OUTPATIENT
Start: 2018-12-06 | End: 2018-12-13 | Stop reason: HOSPADM

## 2018-12-06 RX ORDER — CLONAZEPAM 0.5 MG/1
0.5 TABLET ORAL
Status: DISCONTINUED | OUTPATIENT
Start: 2018-12-06 | End: 2018-12-13 | Stop reason: HOSPADM

## 2018-12-06 RX ORDER — NALOXONE HYDROCHLORIDE 0.4 MG/ML
.1-.4 INJECTION, SOLUTION INTRAMUSCULAR; INTRAVENOUS; SUBCUTANEOUS
Status: DISCONTINUED | OUTPATIENT
Start: 2018-12-06 | End: 2018-12-13 | Stop reason: HOSPADM

## 2018-12-06 RX ORDER — IRBESARTAN 75 MG/1
300 TABLET ORAL AT BEDTIME
Status: DISCONTINUED | OUTPATIENT
Start: 2018-12-06 | End: 2018-12-13 | Stop reason: HOSPADM

## 2018-12-06 RX ORDER — ALBUTEROL SULFATE 90 UG/1
2 AEROSOL, METERED RESPIRATORY (INHALATION) EVERY 6 HOURS PRN
Status: DISCONTINUED | OUTPATIENT
Start: 2018-12-06 | End: 2018-12-13 | Stop reason: HOSPADM

## 2018-12-06 RX ORDER — ASPIRIN 81 MG/1
81 TABLET ORAL DAILY
Status: DISCONTINUED | OUTPATIENT
Start: 2018-12-06 | End: 2018-12-13 | Stop reason: HOSPADM

## 2018-12-06 RX ORDER — MAGNESIUM OXIDE 400 MG/1
400 TABLET ORAL AT BEDTIME
Status: DISCONTINUED | OUTPATIENT
Start: 2018-12-06 | End: 2018-12-13 | Stop reason: HOSPADM

## 2018-12-06 RX ORDER — IPRATROPIUM BROMIDE AND ALBUTEROL SULFATE 2.5; .5 MG/3ML; MG/3ML
1 SOLUTION RESPIRATORY (INHALATION) EVERY 6 HOURS PRN
Status: DISCONTINUED | OUTPATIENT
Start: 2018-12-06 | End: 2018-12-13 | Stop reason: HOSPADM

## 2018-12-06 RX ORDER — ALBUMIN, HUMAN INJ 5% 5 %
250 SOLUTION INTRAVENOUS
Status: DISCONTINUED | OUTPATIENT
Start: 2018-12-06 | End: 2018-12-06

## 2018-12-06 RX ORDER — DAPSONE 100 MG/1
100 TABLET ORAL AT BEDTIME
Status: DISCONTINUED | OUTPATIENT
Start: 2018-12-06 | End: 2018-12-13 | Stop reason: HOSPADM

## 2018-12-06 RX ORDER — ACETAMINOPHEN 325 MG/1
650 TABLET ORAL EVERY 4 HOURS PRN
Status: DISCONTINUED | OUTPATIENT
Start: 2018-12-06 | End: 2018-12-13 | Stop reason: HOSPADM

## 2018-12-06 RX ORDER — ONDANSETRON 2 MG/ML
4 INJECTION INTRAMUSCULAR; INTRAVENOUS EVERY 6 HOURS PRN
Status: DISCONTINUED | OUTPATIENT
Start: 2018-12-06 | End: 2018-12-13 | Stop reason: HOSPADM

## 2018-12-06 RX ORDER — HYDRALAZINE HYDROCHLORIDE 20 MG/ML
10 INJECTION INTRAMUSCULAR; INTRAVENOUS EVERY 4 HOURS PRN
Status: DISCONTINUED | OUTPATIENT
Start: 2018-12-06 | End: 2018-12-11

## 2018-12-06 RX ORDER — MIRTAZAPINE 15 MG/1
15 TABLET, FILM COATED ORAL AT BEDTIME
Status: DISCONTINUED | OUTPATIENT
Start: 2018-12-06 | End: 2018-12-13 | Stop reason: HOSPADM

## 2018-12-06 RX ORDER — GUAIFENESIN 600 MG/1
600 TABLET, EXTENDED RELEASE ORAL 2 TIMES DAILY
Status: DISCONTINUED | OUTPATIENT
Start: 2018-12-06 | End: 2018-12-13 | Stop reason: HOSPADM

## 2018-12-06 RX ORDER — CARVEDILOL 12.5 MG/1
12.5 TABLET ORAL 2 TIMES DAILY WITH MEALS
Status: DISCONTINUED | OUTPATIENT
Start: 2018-12-06 | End: 2018-12-13 | Stop reason: HOSPADM

## 2018-12-06 RX ADMIN — CARVEDILOL 12.5 MG: 12.5 TABLET, FILM COATED ORAL at 10:03

## 2018-12-06 RX ADMIN — IRBESARTAN 300 MG: 75 TABLET ORAL at 21:52

## 2018-12-06 RX ADMIN — HYDRALAZINE HYDROCHLORIDE 10 MG: 20 INJECTION INTRAMUSCULAR; INTRAVENOUS at 07:40

## 2018-12-06 RX ADMIN — ABACAVIR 600 MG: 300 TABLET, FILM COATED ORAL at 21:51

## 2018-12-06 RX ADMIN — SODIUM CHLORIDE 300 ML: 9 INJECTION, SOLUTION INTRAVENOUS at 13:49

## 2018-12-06 RX ADMIN — INSULIN GLARGINE 25 UNITS: 100 INJECTION, SOLUTION SUBCUTANEOUS at 22:22

## 2018-12-06 RX ADMIN — DOXERCALCIFEROL 1.5 MCG: 4 INJECTION, SOLUTION INTRAVENOUS at 14:06

## 2018-12-06 RX ADMIN — SODIUM CHLORIDE 250 ML: 9 INJECTION, SOLUTION INTRAVENOUS at 13:49

## 2018-12-06 RX ADMIN — TAZOBACTAM SODIUM AND PIPERACILLIN SODIUM 2.25 G: 250; 2 INJECTION, SOLUTION INTRAVENOUS at 10:04

## 2018-12-06 RX ADMIN — PANTOPRAZOLE SODIUM 40 MG: 40 TABLET, DELAYED RELEASE ORAL at 18:41

## 2018-12-06 RX ADMIN — CARVEDILOL 12.5 MG: 12.5 TABLET, FILM COATED ORAL at 18:41

## 2018-12-06 RX ADMIN — DARUNAVIR 800 MG: 800 TABLET, FILM COATED ORAL at 21:53

## 2018-12-06 RX ADMIN — HYDRALAZINE HYDROCHLORIDE 25 MG: 25 TABLET ORAL at 10:03

## 2018-12-06 RX ADMIN — HYDRALAZINE HYDROCHLORIDE 10 MG: 20 INJECTION INTRAMUSCULAR; INTRAVENOUS at 12:33

## 2018-12-06 RX ADMIN — ATORVASTATIN CALCIUM 40 MG: 40 TABLET, FILM COATED ORAL at 21:52

## 2018-12-06 RX ADMIN — RITONAVIR 100 MG: 100 TABLET, FILM COATED ORAL at 21:53

## 2018-12-06 RX ADMIN — GABAPENTIN 300 MG: 300 CAPSULE ORAL at 19:42

## 2018-12-06 RX ADMIN — ACETAMINOPHEN 650 MG: 325 TABLET, FILM COATED ORAL at 19:42

## 2018-12-06 RX ADMIN — ERYTHROPOIETIN 10000 UNITS: 10000 INJECTION, SOLUTION INTRAVENOUS; SUBCUTANEOUS at 14:06

## 2018-12-06 RX ADMIN — Medication: at 13:50

## 2018-12-06 RX ADMIN — INSULIN ASPART 1 UNITS: 100 INJECTION, SOLUTION INTRAVENOUS; SUBCUTANEOUS at 12:38

## 2018-12-06 RX ADMIN — MIRTAZAPINE 15 MG: 15 TABLET, FILM COATED ORAL at 21:53

## 2018-12-06 RX ADMIN — CLOPIDOGREL 75 MG: 75 TABLET, FILM COATED ORAL at 19:42

## 2018-12-06 RX ADMIN — TAZOBACTAM SODIUM AND PIPERACILLIN SODIUM 2.25 G: 250; 2 INJECTION, SOLUTION INTRAVENOUS at 18:44

## 2018-12-06 RX ADMIN — ACETAMINOPHEN 650 MG: 325 TABLET, FILM COATED ORAL at 12:36

## 2018-12-06 RX ADMIN — Medication 400 MG: at 21:53

## 2018-12-06 RX ADMIN — DAPSONE 100 MG: 100 TABLET ORAL at 21:52

## 2018-12-06 RX ADMIN — DOLUTEGRAVIR SODIUM 50 MG: 50 TABLET, FILM COATED ORAL at 21:54

## 2018-12-06 RX ADMIN — HYDRALAZINE HYDROCHLORIDE 25 MG: 25 TABLET ORAL at 18:41

## 2018-12-06 RX ADMIN — TAZOBACTAM SODIUM AND PIPERACILLIN SODIUM 2.25 G: 250; 2 INJECTION, SOLUTION INTRAVENOUS at 04:46

## 2018-12-06 RX ADMIN — ISOSORBIDE MONONITRATE 120 MG: 60 TABLET, EXTENDED RELEASE ORAL at 19:42

## 2018-12-06 ASSESSMENT — ACTIVITIES OF DAILY LIVING (ADL)
ADLS_ACUITY_SCORE: 17
ADLS_ACUITY_SCORE: 18
ADLS_ACUITY_SCORE: 18
ADLS_ACUITY_SCORE: 17
ADLS_ACUITY_SCORE: 17

## 2018-12-06 NOTE — ED PROVIDER NOTES
"  History     Chief Complaint:  Generalized weakness    HPI   Donald Raza is a 68 year old male, with a history of HIV, hypertension, ESRD, and hyperlipidemia, who presents with generalized weakness. The patient's daughter states that the patient had dialysis and blood transfusion yesterday and was more fatigue, which is normal after dialysis. This morning, at approximately 0500, the patient had told his daughter that he was unable to sleep due to a headache and took Tylenol. The patient's daughter states that she had returned home from work and the patient was still sleeping, appeared more weak than his baseline, slower responses, and continued headache, prompting his ED visit. Here, the patient states that he also is having pain in his legs and intermittent cough, but denies any shortness of breath, rash. The patient states that his CD4 count is \"very good\" with no defining AIDS illness.     Allergies:  Calcium Acetate  Diagnostic X-Ray Materials  Lisinopril  Sulfa Drugs      Medications:    Albuterol  Aspirin  Atorvastatin  Coreg  Chlorhexidine  Clonazepam  Clopidogrel  Dapsone  Prezista  Tivicay  Cholecalciferol  Epogen  Gabapentin  Hydralazine  Aldara  Lantus  Humalog pen  Duoneb  Avapro  Remeron  Nepro  Lovaza  Nitroglycerin  Nepro  Omeprazole   Velphoro    Past Medical History:    ACS  Anemia due to CKD  Huang disease  Bradycardia  CAD  Cancer  Dilated aorta root  ERSD  Hemodialysis-associated hypotension  HIV  Hypertension  Hyperlipidemia  NSTEMI  TIA  Type 2 diabetes  Unstable angina     Past Surgical History:    Angiogram  Appendectomy  Cholecystectomy  Colostomy  Left heart catheterization  Heart cath, angioplasty  Stent coronary x2    Family History:    CABG  Kidney disease  Dilated aorta    Social History:  Presents with his daughter and brother.  Former Smoker - quit 2003   Negative for alcohol use.   Nephrologist Dr. Carmichael   Marital Status:   [2]     Review of Systems   Constitutional: " Positive for fatigue and fever.   Respiratory: Positive for cough.    Musculoskeletal:        Leg pain    Neurological: Positive for weakness (generalized) and headaches.   All other systems reviewed and are negative.      Physical Exam   First Vitals:  BP: (!) 214/109  Heart Rate: 102  Temp: 101.3  F (38.5  C)  Resp: 22  SpO2: 99 %    Physical Exam    General:   Pleasant, age appropriate mildly ill appearing male.  HEENT:    Oropharynx is moist  Eyes:    Conjunctiva normal     PERRL  Neck:    Supple, no meningismus.     CV:     Regular rate and rhythm.      Grade 2/6 systolic murmur     No rubs or gallops.       No unilateral leg swelling.       LUE fistula with thrill     1+ lower extremity edema.  PULM:    Scattered inspiratory rales     No respiratory distress.      Good air exchange.     No wheezing.     No stridor.  ABD:    Soft, non-tender, non-distended.       No pulsatile masses.       No rebound, guarding or rigidity.  MSK:     No gross deformity to all four extremities.   LYMPH:   No cervical lymphadenopathy.  NEURO:   Mild somnolence.      speech is mildly slurred     CN II-XII intact      Strength is globally weak yet symmetric all 4 extremities.  Sensation is intact.       Normal muscular tone, no tremor.  Skin:    Hot, dry and intact.    Psych:    Mood is good and affect is appropriate.    Emergency Department Course   ECG:  Indication: generalized weakness  Time: 2126  Vent. Rate 95 bpm. RI interval 174. QRS duration 96. QT/QTc 384/482. P-R-T axis 42 23 55. Normal sinus rhythm. Incomplete right bundle branch block. Possible inferior infarct, age undetermined. Abnormal ECG. Read time: 2140. No significant changes to EKG dated 9/4/18.      Imaging:  Radiographic findings were communicated with the patient who voiced understanding of the findings.  XR Chest 2 views:   Lateral view is limited due to motion artifact. Lungs are  hypoinflated with perihilar and basilar opacities which are favored  to  represent atelectasis. Superimposed subtle infection or aspiration  cannot be completely excluded. Right upper lobe aeration has improved.  Heart size is unchanged. As per radiology.     Laboratory:  CBC: WBC: 6.4, HGB: 8.6 (L), PLT: 128 (L)  CMP: Glucose 195 (H), BUN: 61 (H), Creatinine: 8.49 (H), GFR: 6 (L), o/w WNL    Lactic acid: 2.2 (H)  INR: 1.03  Partial thromboplastin time: 20 (L)    Blood culture: In process    Interventions:  2106 Tylenol, 1000 mg, PO   2200 0.9% Sodium Chloride Bolus, 1L, IV   2227 Zosyn, 2.25 g, IV  Vancocin,  2000 mg, IV: ordered    Emergency Department Course:  Nursing notes and vitals reviewed. EKG was obtained, findings above.     2058  I performed an exam of the patient as documented above.     IV inserted. Medicine administered as documented above. Blood drawn. This was sent to the lab for further testing, results above.    The patient was sent for a chest XR while in the emergency department, findings above.     2256 I rechecked the patient and discussed the results of his workup thus far.     2311  I consulted with Dr. Diaz of the hospitalist services. They are in agreement to accept the patient for admission.    Findings and plan explained to the Patient who consents to admission. Discussed the patient with Dr. Diaz, who will admit the patient to a medical bed for further monitoring, evaluation, and treatment.        Impression & Plan    CMS Diagnoses:   The patient has signs of Severe Sepsis as evidenced by:    1. 2 SIRS criteria, AND  2. Suspected infection, AND   3. Organ dysfunction: Lactic Acid > 1.9    Time severe sepsis diagnosis confirmed = 2140 as this was the time when Lactate resulted, and the level was > 1.9      3 Hour Severe Sepsis Bundle Completion:  1. Initial Lactic Acid Result:   Recent Labs   Lab Test  12/05/18   2140  09/04/18   1754  09/03/18   1750   LACT  2.2*  1.7  2.1*     2. Blood Cultures before Antibiotics: Yes  3. Broad Spectrum Antibiotics  Administered: Yes     Anti-infectives (Future)    Start     Dose/Rate Route Frequency Ordered Stop    18 210  vancomycin (VANCOCIN) 2,000 mg in sodium chloride 0.9 % 500 mL intermittent infusion      2,000 mg  over 2 Hours Intravenous ONCE 18 2100          4. 1000 ml of IV fluids.  Ideal body weight: 77.6 kg (171 lb 1.2 oz)  Adjusted ideal body weight: 81.3 kg (179 lb 3.9 oz)    Severe Sepsis reassessment:  1. Repeat Lactic Acid Level: pending  2. MAP>65 after initial IVF bolus, will continue to monitor fluid status and vital signs        Medical Decision Makin-year-old male with a history of end-stage renal disease on hemodialysis as well as HIV with self-reported normal CD4 counts presents with fever and generalized weakness.  In regards to his fever, the exact source is uncertain.  He has no overt signs of soft tissue infection.  He has no celsa respiratory symptoms.  Chest x-ray is equivocal.  I will order noncontrast CT scan to evaluate for occult pneumonia.  He has a mild headache but no objective signs of pain at rest and no other meningeal signs.  I do not feel that lumbar puncture is indicated at this time.  He has no abdominal discomfort to draw concern for occult intra-abdominal infection.  Patient is at high risk for bacteremia.  Blood cultures are pending.  Lactic mildly elevated.  Limited fluids provided as he is mildly hypervolemic and on ESRD.  Broad-spectrum antibiotics provided.  Patient will transfer to the floor for further evaluation and management.    Diagnosis:    ICD-10-CM    1. Febrile illness R50.9 Influenza A/B antigen   2. End stage renal disease on dialysis (H) N18.6     Z99.2    3. Generalized muscle weakness M62.81        Disposition:  Admitted to Mariana Swenson, am serving as a scribe on 2018 at 9:03 PM to personally document services performed by Al Seaman MD based on my observations and the provider's statements to me.       Mariana Macedo  12/5/2018   [facility] EMERGENCY DEPARTMENT       Al Seaman MD  12/05/18 [id number]

## 2018-12-06 NOTE — PHARMACY-ADMISSION MEDICATION HISTORY
Admission medication history interview status for this patient is complete. See [facility] admission navigator for allergy information, prior to admission medications and immunization status.     Medication history interview source(s):Patient  Medication history resources (including written lists, pill bottles, clinic record):None    Changes made to PTA medication list:  Added: pantoprazole per Care Everywhere  Deleted: omeprazole, hydralazine (patient states med was discontinued due to hypotension)   Changed: re-entered insulin lispro sliding scale as a separate entry  For patients on insulin therapy: Y   Lantus/levemir/NPH/Mix 70/30 dose: yes   (see Med list for doses)   Sliding scale Novolog: yes  If Yes, do you have a baseline novolog pre-meal dose:   15 units with meals  Patients eat three meals a day:  -   How many episodes of hypoglycemia do you have per week: unable to assess   How many missed doses do you have per week: patient unable to assess  How many times do you check your blood glucose per day: -     Any Barriers to therapy - Be specific :  cost of medications, comfortable with giving injections (if applicable), comfortable and confident with current diabetes regimen: -       Actions taken by pharmacist (provider contacted, etc): called patient's daughter, no option to leave a voice mail     Additional medication history information: Patient states that hydralazine was discontinued recently due to hypotension. Hydralazine is not on [facility] record in Care Everywhere, so removed hydralazine from [facility] PTA med list.   Medication reconciliation/reorder completed by provider prior to medication history? yes    Prior to Admission medications    Medication Sig Last Dose Taking? Auth Provider   abacavir (ZIAGEN) 300 MG tablet Take 600 mg by mouth every evening  12/3/2018 Yes Abstract, Provider   Acetaminophen (TYLENOL PO) Take by mouth as needed for mild pain or fever 12/3/2018 Yes Unknown, Entered By  History   albuterol (PROAIR HFA/PROVENTIL HFA/VENTOLIN HFA) 108 (90 BASE) MCG/ACT Inhaler Inhale 2 puffs into the lungs every 6 hours as needed for shortness of breath / dyspnea or wheezing  Yes Nelson Worthy MD   aspirin EC 81 MG EC tablet Take 1 tablet (81 mg) by mouth daily 12/3/2018 Yes Clemencia Pal MD   atorvastatin (LIPITOR) 40 MG tablet Take 40 mg by mouth At Bedtime 12/3/2018 Yes Unknown, Entered By History   B COMPLEX-C-FOLIC ACID PO Take 1 tablet by mouth At Bedtime  12/3/2018 Yes Reported, Patient   carvedilol (COREG) 12.5 MG tablet Take 1 tablet (12.5 mg) by mouth 2 times daily (with meals) 12/3/2018 Yes Clemencia Pal MD   chlorhexidine (CHLORHEXIDINE) 0.12 % solution Rinse and gargle 15 ml by mouth twice a day as directed.  Yes Abstract, Provider   CLONAZEPAM PO Take 0.5 mg by mouth nightly as needed for anxiety (restless legs)  Yes Unknown, Entered By History   CLOPIDOGREL BISULFATE PO Take 75 mg by mouth every evening  12/3/2018 Yes Unknown, Entered By History   dapsone 100 MG tablet Take 100 mg by mouth At Bedtime  12/3/2018 Yes Reported, Patient   Darunavir Ethanolate (PREZISTA PO) Take 800 mg by mouth At Bedtime. 12/3/2018 Yes Reported, Patient   dolutegravir (TIVICAY) 50 MG tablet Take 50 mg by mouth At Bedtime 12/3/2018 Yes Unknown, Entered By History   DOXERCALCIFEROL IV Inject 6 mcg into the vein three times a week (with dialysis)  Yes Reported, Patient   epoetin brittni (EPOGEN,PROCRIT) 01762 UNIT/ML injection Inject 11,000 Units Subcutaneous three times a week WITH DIALYSIS  Yes Unknown, Entered By History   gabapentin (NEURONTIN) 300 MG capsule Take 300 mg by mouth 2 times daily May take a third dose after dialysis. 12/3/2018 Yes Reported, Patient   guaiFENesin (MUCINEX) 600 MG 12 hr tablet Take by mouth as needed for congestion  Yes Unknown, Entered By History   insulin glargine (LANTUS) 100 UNIT/ML injection Inject 25 Units Subcutaneous 2 times daily  12/3/2018 Yes Unknown,  Entered By History   Insulin Lispro (HUMALOG PEN SC) Take 15 units TID and an additional 2 units for every 50 mg/dL if BG is over 150 12/3/2018 Yes Unknown, Entered By History   insulin lispro (HUMALOG PEN) 100 UNIT/ML pen Inject Subcutaneous 3 times daily (before meals) Sliding scale: takes 2 units for every 50 mg/dL over 150.  Yes Unknown, Entered By History   MAGNESIUM OXIDE PO Take 400 mg by mouth At Bedtime 12/3/2018 Yes Unknown, Entered By History   mirtazapine (REMERON) 15 MG tablet Take 15 mg by mouth At Bedtime 12/3/2018 Yes Reported, Patient   omega-3 acid ethyl esters (LOVAZA) 1 G capsule Take 2 g by mouth 2 times daily 12/3/2018 Yes Unknown, Entered By History   pantoprazole (PROTONIX) 40 MG EC tablet Take 40 mg by mouth daily 12/3/2018 Yes Unknown, Entered By History   ritonavir (NORVIR) 100 MG capsule Take 1 capsule by mouth At Bedtime  12/3/2018 Yes Reported, Patient   sucroferric oxyhydroxide (VELPHORO) 500 MG CHEW chewable tablet Take 500 mg by mouth 3 times daily (with meals) 12/3/2018 Yes Unknown, Entered By History   imiquimod (ALDARA) 5 % cream Apply topically as needed   Reported, Patient   ipratropium - albuterol 0.5 mg/2.5 mg/3 mL (DUONEB) 0.5-2.5 (3) MG/3ML neb solution Take 1 vial (3 mLs) by nebulization every 6 hours as needed for shortness of breath / dyspnea or wheezing   Catrachito Rosado DO   irbesartan (AVAPRO) 300 MG tablet Take 1 tablet (300 mg) by mouth At Bedtime 12/3/2018  Clemencia Pal MD   iron sucrose (VENOFER) 20 MG/ML injection Inject 50 mg into the vein once a week WIth dialysis   Unknown, Entered By History   Isosorbide Mononitrate  MG TB24 Take 1 tablet (120 mg) by mouth every evening 12/3/2018  Yuki Hinojosa APRN CNP   ketoconazole (NIZORAL) 2 % cream Apply topically 2 times daily as needed   Reported, Patient   nitroglycerin (NITROSTAT) 0.4 MG SL tablet One tablet under the tongue every 5 minutes if needed for chest pain. May repeat every 5 minutes  "for a maximum of 3 doses in 15 minutes\"   Lay Paz MD   Nutritional Supplements (NEPRO PO) 1-3 times daily   Unknown, Entered By History   order for DME Equipment being ordered: Other: 4WW  Treatment Diagnosis: Decreased activity tolerance   Lorna Jhaveri MD           "

## 2018-12-06 NOTE — CONSULTS
[facility]    RENAL CONSULTATION NOTE    REFERRING MD:  Dr. Diaz    REASON FOR CONSULTATION:  ESKD, anemia, sob    DATE OF CONSULTATION: 12/06/18    SHORTHAND KEY FOR MY NOTES:  c = with, s = without, p = after, a = before, x = except, asx = asymptomatic, tx = transplant or treatment, sx = symptoms or symptomatic, cx = canceled or culture, rxn = reaction, yday = yesterday, nl = normal, abx = antibiotics, fxn = function, dx = diagnosis, dz = disease, m/h = melena/hematochezia, c/d/l/ha = cramping/dizziness/lightheadedness/headache, d/c = discharge or diarrhea/constipation, f/c/n/v = fevers/chills/nausea/vomiting, cp/sob = chest pain/shortness of breath.    HPI: Donald Raza is a 68 year old male c ESKD 2 DM2 who dialyses TRS via a LAF at the [facility] under the care of Dr. Chavez ([facility]) and was admitted on 12/5/2018 c fever, weakness and sob and found to have pneumonia.    Pt was at [facility] on Tuesday for a blood transfusion and HD, and went home later that day.  He was feeling tired, per his usual post-HD routine, and then slept most of the day.  When his dtr came back from work, he was still quite fatigued and seemed a bit confused c a HA.  As a result, he was brought here for further eval.    In the ER, he had imaging done that suggested pneumonia.  He was started on Vanco/Zosyn and given 1L IVF.  He was admitted to the hospital for further tx.  He has remained febrile overnight and continues to feel weak and sob.  He still has a HA.  No n/v, but he has some mild abd pain.  He is due for HD today.    ROS:  A complete review of systems was performed and is negative x as noted above.    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, recurrent      Bradycardia 5/28/2016     CAD S/P percutaneous coronary angioplasty 6/15/2015     Cancer (H)      Chest pain      Dilated aortic root (H) 5/6/2016     ESRD (end stage renal disease) on dialysis (H) 5/6/2016     Generalized muscle weakness 9/8/2018     Hemodialysis-associated hypotension 5/22/2016     Human immunodeficiency virus (HIV) disease (H)      Hypertension 2010     Hypotension, unspecified hypotension type 5/22/2016     Impotence of organic origin      Increased prostate specific antigen (PSA) velocity 08/08/2016    Awaiting bx on blood thinner     Mixed hyperlipidemia      Near syncope 2016    with hemodialysis     NSTEMI (non-ST elevated myocardial infarction) (H) 12/2015, 5/2016     Pulmonary HTN (H)     Mod     TIA (transient ischemic attack) 5/2016     Type 2 diabetes mellitus (H) age 50     Unstable angina (H) 3/4/2016     PSH:    Past Surgical History:   Procedure Laterality Date     ANGIOGRAM  03-04-16    No culprit lesions, stents widely patent      ANGIOGRAM  05-06-16    Cutting balloon ptca=Diag     APPENDECTOMY  2000     CHOLECYSTECTOMY, LAPOROSCOPIC       COLOSTOMY  09/30/1999    Temporary for diverticulitis     HC LEFT HEART CATHETERIZATION  03/12/2018    No significant change  Elevated LVEDp  LVEF 30% No PCI     HEART CATH, ANGIOPLASTY  08-18-16    LAD PCI. Stented with a 3.0 x 8 mm Xience Alpine stent.     STENT, CORONARY, S660 15/18  12/2015    VANITA=Diag, PTCA=LAD     STENT, CORONARY, S660 15/18  06/2015    AVNITA=LAD     MEDICATIONS:      abacavir  600 mg Oral QPM     aspirin  81 mg Oral Daily     atorvastatin  40 mg Oral At Bedtime     carvedilol  12.5 mg Oral BID w/meals     clopidogrel (PLAVIX) tablet 75 mg  75 mg Oral QPM     dapsone  100 mg Oral At Bedtime     darunavir  800 mg Oral At Bedtime     dolutegravir  50 mg Oral At Bedtime     gabapentin  300 mg Oral BID     guaiFENesin  600 mg Oral BID     hydrALAZINE  25 mg Oral TID     insulin aspart  1-7 Units Subcutaneous TID AC     insulin  "aspart  1-5 Units Subcutaneous At Bedtime     insulin glargine  25 Units Subcutaneous BID     irbesartan  300 mg Oral At Bedtime     isosorbide mononitrate CR  120 mg Oral QPM     magnesium oxide (MAG-OX) tablet 400 mg  400 mg Oral At Bedtime     mirtazapine  15 mg Oral At Bedtime     omeprazole  40 mg Oral Daily     piperacillin-tazobactam  2.25 g Intravenous Q6H     ritonavir  100 mg Oral At Bedtime     sucroferric oxyhydroxide  500 mg Oral TID w/meals     vancomycin place koenig - receiving intermittent dosing  1 each Intravenous See Admin Instructions     ALLERGIES:    Allergies as of 12/05/2018 - Danny as Reviewed 12/05/2018   Allergen Reaction Noted     Calcium acetate Other (See Comments) 07/27/2017     Diagnostic x-ray materials Other (See Comments) 09/24/2012     Lisinopril  07/23/2012     Sulfa drugs  07/23/2012     FH:    Family History   Problem Relation Age of Onset     Heart Disease Brother 40     CABG     KIDNEY DISEASE Sister      Hypertension Sister      Heart Disease Brother      Dilated aorta     SH:    Social History     Social History     Marital status:      Spouse name: N/A     Number of children: N/A     Years of education: N/A     Occupational History     Not on file.     Social History Main Topics     Smoking status: Former Smoker     Packs/day: 2.00     Years: 41.00     Types: Cigarettes     Quit date: 2003     Smokeless tobacco: Never Used     Alcohol use No     Drug use: No     Sexual activity: Not on file     Other Topics Concern     Parent/Sibling W/ Cabg, Mi Or Angioplasty Before 65f 55m? Yes     Caffeine Concern Yes     2 cups daily     Sleep Concern Yes     Special Diet No      more proteins     Exercise Yes     Cardiac rehab      Social History Narrative     PHYSICAL EXAM:    /89  Temp 99  F (37.2  C) (Oral)  Resp 26  Ht 1.803 m (5' 11\")  Wt 91.8 kg (202 lb 4.8 oz)  SpO2 92%  BMI 28.22 kg/m2    GENERAL: awake, alert, looks sick  HEENT:  Normocephalic. No gross " abnormalities.  MMM.  Edentulous.  Pupils equal.  EOMI.  No scleral icterus.  CV: RRR c 2/6 murmurs, no clicks, gallops, or rubs, tr ble edema.  RESP: wheezes, few crackles bilaterally with decent efforts  GI: abdomen o/s/nt/nd, BS present. No masses, organomegaly  MUSCULOSKELETAL: extremities nl - no gross deformities noted  SKIN:  Diaphoretic, warm  NEURO:  strength weak, symmetric  PSYCH: mood good, affect appropriate  LYMPH:  no palpable ant/post cervical and supraclavicular adenopathy  OTHER:  Access - LAF    Pt seen on HD.  Stable run expected.  -3-4L UF goal.    LABS:      CBC RESULTS:     Recent Labs  Lab 12/06/18  0933 12/05/18  2140   WBC 6.6 6.4   RBC 2.84* 3.03*   HGB 8.1* 8.6*   HCT 26.2* 28.2*   PLT 96* 128*     BMP RESULTS:    Recent Labs  Lab 12/06/18  0933 12/05/18  2140    137   POTASSIUM 4.4 4.4   CHLORIDE 103 100   CO2 24 26   BUN 69* 61*   CR 9.63* 8.49*   * 195*   PRABHA 8.9 9.4     INR  Recent Labs  Lab 12/05/18  2214   INR 1.03      DIAGNOSTICS:  Personally reviewed CXR, chest CT    A/P:  Donald Raza is a 68 year old male c ESKD who has pneumonia and is sick.    1.  ESKD.  Pt dialysing today per MWF schedule.  He is 5 kg above his EDW, and has crackles on exam.  We will attempt to pull fluid today, but will not achieve EDW today.    A.  HD per TRS schedule.  B.  Will try to remove 3-4L of fluid today.  C.  No IVF.    2.  Pneumonia/SOB.  Pt is sick.  He is on supp o2 at present.  Still has a LGF p aceta.    A.  Continue o2.  B.  Follow clinically.  C.  Abx at proper renal dose.    3.  HTN.  Pt's BP is quite high, in part due to fluid and also due to prob not taking meds last night.  A.  Push EDW as able.  B.  Continue BP meds.    4.  Anemia.  Pt's hb is down to the low 8s.  He just rec'd 2 units of blood on Tuesday.  Long h/o transfusion dependence.  A.  Follow hb, clinically.  B.  EPO 10k c HD.    5.  HIV.  Stable.  A.  Continue all meds.    6.  FEN.  Electrolytes are ok.  A.   Dialysis diabetic diet.    Thank you for this consultation. We will follow c you.  Please call if any questions.    Attestation:   I have reviewed today's relevant vital signs, notes, medications, labs and imaging.    Bipin Chaves MD  [facility] - Nephrology  [phone]

## 2018-12-06 NOTE — ED TRIAGE NOTES
Pt dialyzed yesterday and was transfused with two units of blood during dialysis.  States since then he's not feeling well in general.  C/o dizziness, poor balance, poor appetite, and difficulty breathing.  Pt also notes headache.

## 2018-12-06 NOTE — PLAN OF CARE
Problem: Patient Care Overview  Goal: Plan of Care/Patient Progress Review  Outcome: Declining  Pt lethargic and weak all day. Remains on 3L oxygen. Continues to be hypertensive- home meds restarted and PRN Hydralazine Q4 hrs. Febrile and c/o BLE pain- PRN Tylenol given. SIRS fired- Lactic drawn. Pt at Dialysis this afternoon.     Tammie Howard RN on 12/6/2018 at 2:18 PM

## 2018-12-06 NOTE — PROGRESS NOTES
0655 - MD paged - /88, pulse 88 - pt reporting chest pressure, placed on tele momentarily - currently in SR -  pt was weaned from O2 and now requiring 3L to maintain sats at 89% - please review and order as appropriate.

## 2018-12-06 NOTE — PROGRESS NOTES
Potassium   Date Value Ref Range Status   12/06/2018 4.4 3.4 - 5.3 mmol/L Final     Lab Results   Component Value Date    HGB 8.1 12/06/2018     Weight: 91.8 kg (202 lb 4.8 oz)  POST WT  DIALYSIS PROCEDURE NOTE  Hepatitis status of previous patient on machine log was checked and verified ok to use with this patients hepatitis status.  Patient dialyzed for 3.75 hrs. on a 3 K bath with a net fluid removal of  4L.  A BFR of 400 ml/min was obtained via a LUE AVF using 15gauge needles.    The patient was seen by Dr. Chaves during the treatment.  Total heparin received during the treatment: 0 units. Sites held x 7 min then  pressure drsgs applied.  Meds  Given: Epogen, Hectorol. Complications: None.  Procedure and ESRD teaching provided, patient demonstrated understanding. See flowsheet in EPIC for further details and post assessment.  Machine water alarm in place and functioning. Transducer pods intact and checked every 15min.  Pt returned via WC  Vascular Access: Aseptic prep done for both on/off.  Report received from: ROBERT Howard RN  Report given to: MESSI Adams RN  HEPATITIS B SURFACE ANTIGEN Non-Reactive DATE 1/20/18   HEPATITIS B SURFACE ANTIBODY Immune DATE 1/20/18  Chlorine/Chloramine water system checked every 4 hours.  Outpatient Dialysis at [facility]

## 2018-12-06 NOTE — PROGRESS NOTES
[facility]    Hospitalist Progress Note  Provider : Lorna Jhaveri MD  Date of Service (when I saw the patient): 12/06/2018    Assessment & Plan    This is a 68-year-old gentleman with a history of end-stage renal disease on hemodialysis Tuesdays, Thursdays, Saturdays; HIV with CD4 count greater than 400 per the patient; hyperlipidemia; coronary artery disease with prior PCI; prior history of TIA, who gets periodic blood transfusions every 3-4 months, came for evaluation for generalized weakness and fever. Also had mild headaches.     1.  Sepsis, likely viral, however, cannot exclude underlying bacterial etiology given his immunocompromised status with HIV.  He has been initiated on Zosyn and vancomycin, which I will continue for now.   --Will check procalcitonin  --Cultures pending. Influenza A/B negative     2.  End-stage renal disease on hemodialysis. Nephrology consulted. Due for HD today.     3.  History of anemia, status post blood transfusion.  He denies any active bleeding, Will monitor him.       4.  Diabetes.  Will place him on Accu-Cheks along with insulin. Will monitor blood sugar    5. CAD s/p PCI: Will resume his cardiac medications.     5. History of HIV: Will resume his antiretroviral drugs.       CODE STATUS:  Full code.     DVT Prophylaxis: Pneumatic Compression Devices    Code Status: Full Code    Disposition: Expected discharge in 1-2 days if he continues to improve and culture remains negative    Lorna Jhaveri MD    Interval History   Patient seen and examined. He stated that he is feeling a little better. He has no nausea or vomiting. He denies abdominal pain. No fever today. Also denies nausea or vomiting.     -Data reviewed today: I reviewed all new labs and imaging results over the last 24 hours. I personally reviewed    Physical Exam   Temp: 101.1  F (38.4  C) Temp src: Oral BP: 193/89   Heart Rate: 100 Resp: 26 SpO2: 90 % O2 Device: Nasal cannula Oxygen Delivery:  3 LPM  Vitals:    12/06/18 0136   Weight: 91.8 kg (202 lb 4.8 oz)     Vital Signs with Ranges  Temp:  [96.6  F (35.9  C)-101.3  F (38.5  C)] 101.1  F (38.4  C)  Heart Rate:  [] 100  Resp:  [19-26] 26  BP: (171-214)/() 193/89  SpO2:  [90 %-99 %] 90 %  I/O last 3 completed shifts:  In: 150 [P.O.:150]  Out: -     GEN:  Alert, oriented x 3, appears comfortable, NAD.  HEENT:  Normocephalic/atraumatic, no scleral icterus, no nasal discharge, mouth moist.  CV:  Regular rate and rhythm, no murmur or JVD.  S1 + S2 noted, no S3 or S4.  LUNGS:  Clear to auscultation bilaterally without rales/rhonchi/wheezing/retractions.  Symmetric chest rise on inhalation noted.  ABD:  Active bowel sounds, soft, non-tender/non-distended.  No rebound/guarding/rigidity.  EXT:  No edema or cyanosis.  Hands/feet warm to touch with good signs of peripheral perfusion.  No joint synovitis noted.  SKIN:  Dry to touch, no exanthems noted in the visualized areas.  NEURO:  Symmetric muscle strength, sensation to touch grossly intact.  No new focal deficits appreciated.    Medications       abacavir  600 mg Oral QPM     atorvastatin  40 mg Oral At Bedtime     carvedilol  12.5 mg Oral BID w/meals     clopidogrel (PLAVIX) tablet 75 mg  75 mg Oral QPM     gabapentin  300 mg Oral BID     hydrALAZINE  25 mg Oral TID     insulin aspart  1-7 Units Subcutaneous TID AC     insulin aspart  1-5 Units Subcutaneous At Bedtime     insulin glargine  25 Units Subcutaneous BID     irbesartan  300 mg Oral At Bedtime     piperacillin-tazobactam  2.25 g Intravenous Q6H     vancomycin place koenig - receiving intermittent dosing  1 each Intravenous See Admin Instructions       Data     Recent Labs  Lab 12/06/18  0933 12/05/18  2214 12/05/18  2140   WBC 6.6  --  6.4   HGB 8.1*  --  8.6*   MCV 92  --  93   PLT 96*  --  128*   INR  --  1.03  --      --  137   POTASSIUM 4.4  --  4.4   CHLORIDE 103  --  100   CO2 24  --  26   BUN 69*  --  61*   CR 9.63*  --   8.49*   ANIONGAP 11  --  11   PRABHA 8.9  --  9.4   *  --  195*   ALBUMIN  --   --  3.8   PROTTOTAL  --   --  8.1   BILITOTAL  --   --  0.6   ALKPHOS  --   --  139   ALT  --   --  13   AST  --   --  15       Recent Results (from the past 24 hour(s))   XR Chest 2 Views    Narrative    CHEST TWO VIEW   12/5/2018 10:42 PM     HISTORY: Fever.     COMPARISON: 9/3/2018      Impression    IMPRESSION: Lateral view is limited due to motion artifact. Lungs are  hypoinflated with perihilar and basilar opacities which are favored to  represent atelectasis. Superimposed subtle infection or aspiration  cannot be completely excluded. Right upper lobe aeration has improved.  Heart size is unchanged.    MAI FAUSTIN MD   Chest CT w/o contrast    Narrative    CT CHEST W/O CONTRAST  12/5/2018 11:44 PM    HISTORY: Fever. Evaluate for occult pneumonia.    TECHNIQUE: Scans obtained from the apices through the diaphragm  without IV contrast. Radiation dose for this scan was reduced using  automated exposure control, adjustment of the mA and/or kV according  to patient size, or iterative reconstruction technique.    COMPARISON: 12/25/2017.    FINDINGS: There is patchy infiltrate in the left upper lobe and to a  lesser extent in the right upper lobe and left lower lobe. There is  dependent atelectasis bilaterally. No pneumothorax or pleural  effusion. There are coronary artery atherosclerotic calcifications.  The heart is enlarged. The thoracic aorta is calcified and tortuous.  No aortic aneurysm. There is a borderline enlarged precarinal lymph  node. No lymph node enlargement. Images through the upper abdomen show  no acute abnormalities. The gallbladder is absent. Multiple vascular  calcifications.      Impression    IMPRESSION: Mild left upper lobe pneumonia and minimal infiltrates in  the right upper lobe and left lower lobe.    LIGIA RIVERO MD

## 2018-12-06 NOTE — PLAN OF CARE
Problem: Patient Care Overview  Goal: Plan of Care/Patient Progress Review  Outcome: No Change  Admitted pt to floor at 0130, A/Ox4, VS - BP 170s/70s afebrile, c/o pain (headache) 5/10 denies interventions at this time, pt reports feeling weak and lethargic, uses straight cane at home - requires walker and assistx2 - used yanna steady x1 due to feelings of weakness and fatigue, daughter called for updates on pt this AM - states she will be in to visit this afternoon around 4pm, She reports dialysis is 4 days per wk ( M, T, Th and Sat) - pt will need dialysis today, , IV zosyn, fistula to L upper extremity with thrill noted, several stools this shift - soft green/black in color, pt now on RA sats 92-94%, pt reports he makes minimal urine and has not urinated this shift, continue with plan of care.

## 2018-12-06 NOTE — ED NOTES
[facility]  ED Nurse Handoff Report    Donald Raza is a 68 year old male   ED Chief complaint: Generalized Weakness  . ED Diagnosis:   Final diagnoses:   Febrile illness   End stage renal disease on dialysis (H)   Generalized muscle weakness     Allergies:   Allergies   Allergen Reactions     Calcium Acetate Other (See Comments)     Other reaction(s): Other, see comments  Pain in chest and back  Pain in chest area (sensitive skin)      Diagnostic X-Ray Materials Other (See Comments)     PN: renal failure     Lisinopril      Sulfa Drugs        Code Status: Full Code  Activity level - Baseline/Home:  Stand with Assist. Activity Level - Current:   Stand with Assist. Lift room needed: No. Bariatric: No   Needed: No   Isolation: No. Infection: Not Applicable.     Vital Signs:   Vitals:    12/05/18 2215 12/05/18 2300 12/05/18 2311 12/05/18 2315   BP: 190/84 190/86  184/89   Resp: 25   20   Temp:   99.4  F (37.4  C)    TempSrc:   Oral    SpO2: 96% 94%  94%       Cardiac Rhythm:  ,      Pain level: 0-10 Pain Scale: 5  Patient confused: No. Patient Falls Risk: Yes.   Elimination Status: Unable to void   Patient Report - Initial Complaint: generalized weakness. Focused Assessment: pt reports increased sob, weakness and fatigue since completing dialysis treatment yesterday.  Tests Performed:   Labs Ordered and Resulted from Time of ED Arrival Up to the Time of Departure from the ED   CBC WITH PLATELETS DIFFERENTIAL - Abnormal; Notable for the following:        Result Value    RBC Count 3.03 (*)     Hemoglobin 8.6 (*)     Hematocrit 28.2 (*)     MCHC 30.5 (*)     RDW 18.1 (*)     Platelet Count 128 (*)     Absolute Lymphocytes 0.6 (*)     All other components within normal limits   COMPREHENSIVE METABOLIC PANEL - Abnormal; Notable for the following:     Glucose 195 (*)     Urea Nitrogen 61 (*)     Creatinine 8.49 (*)     GFR Estimate 6 (*)     GFR Estimate If Black 8 (*)     All other components  within normal limits   LACTIC ACID WHOLE BLOOD - Abnormal; Notable for the following:     Lactic Acid 2.2 (*)     All other components within normal limits   PARTIAL THROMBOPLASTIN TIME - Abnormal; Notable for the following:     PTT 20 (*)     All other components within normal limits   INR   LACTIC ACID WHOLE BLOOD   ROUTINE UA WITH MICROSCOPIC   BLOOD GAS VENOUS   PULSE OXIMETRY NURSING   CARDIAC CONTINUOUS MONITORING   PATIENT CARE ORDER   CARDIAC CONTINUOUS MONITORING   MEASURE URINE OUTPUT   BLOOD CULTURE   BLOOD CULTURE   INFLUENZA A/B ANTIGEN   URINE CULTURE AEROBIC BACTERIAL     Chest CT w/o contrast   Preliminary Result   IMPRESSION: Mild left upper lobe pneumonia and minimal infiltrates in   the right upper lobe and left lower lobe.      XR Chest 2 Views   Final Result   IMPRESSION: Lateral view is limited due to motion artifact. Lungs are   hypoinflated with perihilar and basilar opacities which are favored to   represent atelectasis. Superimposed subtle infection or aspiration   cannot be completely excluded. Right upper lobe aeration has improved.   Heart size is unchanged.      MAI FAUSTIN MD        Treatments provided: see mar  Family Comments: n/a  OBS brochure/video discussed/provided to patient:  No  ED Medications:   Medications   vancomycin (VANCOCIN) 2,000 mg in sodium chloride 0.9 % 500 mL intermittent infusion (2,000 mg Intravenous Given 12/5/18 2254)   0.9% sodium chloride BOLUS (250 mLs Intravenous Not Given 12/6/18 0022)   piperacillin-tazobactam (ZOSYN) infusion 2.25 g (0 g Intravenous Stopped 12/5/18 2255)   acetaminophen (TYLENOL) tablet 1,000 mg (1,000 mg Oral Given 12/5/18 2106)   0.9% sodium chloride BOLUS (0 mLs Intravenous Stopped 12/5/18 2320)   metoclopramide (REGLAN) half-tab 2.5 mg (2.5 mg Oral Given 12/5/18 2331)     Drips infusing:  no  For the majority of the shift, the patient's behavior Green. Interventions performed were none.     Severe Sepsis OR Septic Shock  Diagnosis Present: No      ED Nurse Name/Phone Number: Chiquita Lei,   12:24 AM    RECEIVING UNIT ED HANDOFF REVIEW    Above ED Nurse Handoff Report was reviewed: Yes  Reviewed by: Geetha Li on December 6, 2018 at 1:00 AM

## 2018-12-06 NOTE — H&P
Admitted:     12/05/2018      CHIEF COMPLAINT:  Weakness.      HISTORY OF PRESENT ILLNESS:  This is a 68-year-old gentleman with a history of end-stage renal disease on hemodialysis Tuesdays, Thursdays, Saturdays; HIV with CD4 count greater than 400 per the patient; hyperlipidemia; coronary artery disease with prior PCI; prior history of TIA, who gets periodic blood transfusions every 3-4 months.  The patient states that he had his hemodialysis yesterday and after that he felt a weak.  He has had a fever.  He denies any productive cough but has had a dry cough for the past 2 weeks or so.  He has also been a bit more short-winded.  He complained of a mild headache, was noted to be febrile and subsequently was brought in by his family here to the ER for further evaluation.  In the ER over here, he was seen by Dr. Al Seaman.  I discussed care with him.  I am asked to admit him for further evaluation.      PAST MEDICAL HISTORY:  Significant for type 2 diabetes, TIA, coronary artery disease status post PCI, HIV.  His most recent CD4 count was 141 in 2016; however, the patient states that this has since improved.        PAST SURGICAL HISTORY:  Significant for cholecystectomy, prior colostomy, appendectomy and multiple angiograms.      FAMILY HISTORY:  Significant for kidney disease in his sister.      SOCIAL HISTORY:  He does not smoke.  He does not drink alcohol.      ALLERGIES:  CALCIUM ACETATE, LISINOPRIL, SULFA.      HOME MEDICATION LIST:  Awaiting reconciliation, but includes Lipitor, Coreg, Plavix, antiretrovirals, Imdur, Remeron, Lovaza, insulin.      REVIEW OF SYSTEMS:  As mentioned in the HPI.  He denies any productive cough.  He has headache is mild, 4/10.  There is no associated photophobia or neck pain.   He denies any rash.  He denies any emesis or abdominal pain.  He denies any diarrhea.  He makes minimal urine.  He denies any sick contacts or recent travel.  All other systems are extensively  reviewed and deemed unremarkable and negative.      PHYSICAL EXAMINATION:   VITAL SIGNS:  As follows:  His initial temperature was 101.3, it is presently 99.4.  His pulse is 102.  His blood pressure is 190/86, respiratory rate 25, O2 sat is 94% on 2 liters.   GENERAL:  He is alert, awake, oriented, coherent, lying in bed.   HEENT:  Pupils equal, round, react to light.  Pharynx, there is no exudate noted.   LUNGS:  Clear to auscultation with maybe a few crackles in the bases.   HEART:  Regular rate.  S1, S2 normal.  No murmurs or gallops.   ABDOMEN:  Soft, nontender, with good bowel sounds.   EXTREMITIES:  There is no edema.  He has an AV fistula on his left upper extremity with a good thrill.     SKIN:  There is no rash.   NEUROLOGIC:  Cranial nerves II-XII are grossly within normal limits.  Motor:  He moves all his extremities, though he is weak globally.      LABORATORY DATA:  Labwork obtained here shows the following:  Sodium is 137, potassium 4.4, chloride 100, bicarbonate 26, BUN 61, creatinine 8.49.  GFR of 6.  Calcium 9.4.  Anion gap 11.  Albumin 3.8, total protein 8.1, total bilirubin 0.6, alkaline phosphatase 139, AST of 13, AST of 15.  His lactic acid is 2.2.  Glucose of 195.  His CBC, his white cell count is 6.4, hemoglobin 8.6, hematocrit 28.2, platelet count of 128.  His diff is grossly within normal limits.  Blood cultures obtained here in the ER are pending.      Chest x-ray showed that the lungs are  with perihilar and basilar opacities which are favored to represent atelectasis, superimposed subtle infection or aspiration cannot be completely excluded.  Right upper aeration has improved.  Heart size is unchanged.  An EKG obtained over here shows normal sinus rhythm at 95 beats per minute.  There is no significant ST elevation.        Influenza screen is pending.      ASSESSMENT AND PLAN:   1.  Sepsis, likely viral, however, cannot exclude underlying bacterial etiology given his immunocompromised  status with HIV.  He has been initiated on Zosyn and vancomycin, which I will continue for now.  In addition, we will send off a viral panel on him.   2.  End-stage renal disease on hemodialysis.  We will consult Nephrology.   3.  History of anemia, status post blood transfusion.  He denies any active bleeding, we will monitor him.     4.  Diabetes.  We will place him on Accu-Cheks along with insulin.      CODE STATUS:  Full code.      He will be admitted as an inpatient.         REJI PAYTON MD             D: 2018   T: 2018   MT: MANNY      Name:     GREGORIO BRUSH   MRN:      [id number]        Account:      [id number]   :      1950        Admitted:     2018                   Document: [id number]

## 2018-12-06 NOTE — PROGRESS NOTES
CTS identifies pt as high risk due to elevated MUSHTAQ. Pt currently admitted for Sepsis, this is his 3rd admission in 6 months. Per chart review, resides at home w/ wife, his dtr is [facility] 9 hrs/day M-F. Pt attends HD T,Th,Sat at [facility]. Per notes, his baseline mobility is ambulating w/ cane. He may require PT eval while here. He has had [facility] in the past but was discharged from them in October 2018.     Will need to schedule a hospital follow-up appointment once discharge plan identified.      Handoff will be given to PCP clinic at discharge.     CM will continue to follow patient for any additional discharge needs.     Radha Mayfield RN BSN CTS   [phone]  Care Transitions Team  [facility]

## 2018-12-06 NOTE — PROVIDER NOTIFICATION
12/06/18 0846 12/06/18 0900   Vital Signs   /81 189/90   BP Location Right arm Right arm     Pt hypertensive today. MD aware and restarted pts home BP medications. PRN hydralazine given Q4 hrs. Pt taken to Dialysis this afternoon.     Tammie Howard RN on 12/6/2018 at 2:13 PM

## 2018-12-07 ENCOUNTER — APPOINTMENT (OUTPATIENT)
Dept: PHYSICAL THERAPY | Facility: CLINIC | Age: 68
DRG: 974 | End: 2018-12-07
Attending: INTERNAL MEDICINE
Payer: MEDICARE

## 2018-12-07 LAB
ANION GAP SERPL CALCULATED.3IONS-SCNC: 8 MMOL/L (ref 3–14)
BUN SERPL-MCNC: 39 MG/DL (ref 7–30)
CALCIUM SERPL-MCNC: 9 MG/DL (ref 8.5–10.1)
CHLORIDE SERPL-SCNC: 102 MMOL/L (ref 94–109)
CO2 SERPL-SCNC: 30 MMOL/L (ref 20–32)
CREAT SERPL-MCNC: 6.59 MG/DL (ref 0.66–1.25)
ERYTHROCYTE [DISTWIDTH] IN BLOOD BY AUTOMATED COUNT: 16.8 % (ref 10–15)
FLUAV H1 2009 PAND RNA SPEC QL NAA+PROBE: NEGATIVE
FLUAV H1 RNA SPEC QL NAA+PROBE: NEGATIVE
FLUAV H3 RNA SPEC QL NAA+PROBE: NEGATIVE
FLUAV RNA SPEC QL NAA+PROBE: NEGATIVE
FLUBV RNA SPEC QL NAA+PROBE: NEGATIVE
GFR SERPL CREATININE-BSD FRML MDRD: 8 ML/MIN/1.7M2
GLUCOSE BLDC GLUCOMTR-MCNC: 109 MG/DL (ref 70–99)
GLUCOSE BLDC GLUCOMTR-MCNC: 112 MG/DL (ref 70–99)
GLUCOSE BLDC GLUCOMTR-MCNC: 153 MG/DL (ref 70–99)
GLUCOSE BLDC GLUCOMTR-MCNC: 192 MG/DL (ref 70–99)
GLUCOSE BLDC GLUCOMTR-MCNC: 223 MG/DL (ref 70–99)
GLUCOSE SERPL-MCNC: 114 MG/DL (ref 70–99)
HADV DNA SPEC QL NAA+PROBE: NEGATIVE
HADV DNA SPEC QL NAA+PROBE: NEGATIVE
HCT VFR BLD AUTO: 25.7 % (ref 40–53)
HGB BLD-MCNC: 7.7 G/DL (ref 13.3–17.7)
HMPV RNA SPEC QL NAA+PROBE: NEGATIVE
HPIV1 RNA SPEC QL NAA+PROBE: NEGATIVE
HPIV2 RNA SPEC QL NAA+PROBE: NEGATIVE
HPIV3 RNA SPEC QL NAA+PROBE: NEGATIVE
MCH RBC QN AUTO: 28.1 PG (ref 26.5–33)
MCHC RBC AUTO-ENTMCNC: 30 G/DL (ref 31.5–36.5)
MCV RBC AUTO: 94 FL (ref 78–100)
MICROBIOLOGIST REVIEW: NORMAL
PLATELET # BLD AUTO: 85 10E9/L (ref 150–450)
POTASSIUM SERPL-SCNC: 4.2 MMOL/L (ref 3.4–5.3)
RBC # BLD AUTO: 2.74 10E12/L (ref 4.4–5.9)
RHINOVIRUS RNA SPEC QL NAA+PROBE: NEGATIVE
RSV RNA SPEC QL NAA+PROBE: NEGATIVE
RSV RNA SPEC QL NAA+PROBE: NEGATIVE
SODIUM SERPL-SCNC: 140 MMOL/L (ref 133–144)
SPECIMEN SOURCE: NORMAL
VANCOMYCIN SERPL-MCNC: 13.7 MG/L
WBC # BLD AUTO: 5.2 10E9/L (ref 4–11)

## 2018-12-07 PROCEDURE — 99233 SBSQ HOSP IP/OBS HIGH 50: CPT | Performed by: INTERNAL MEDICINE

## 2018-12-07 PROCEDURE — A9270 NON-COVERED ITEM OR SERVICE: HCPCS | Mod: GY | Performed by: INTERNAL MEDICINE

## 2018-12-07 PROCEDURE — 97530 THERAPEUTIC ACTIVITIES: CPT | Mod: GP | Performed by: PHYSICAL THERAPIST

## 2018-12-07 PROCEDURE — 40000193 ZZH STATISTIC PT WARD VISIT: Performed by: PHYSICAL THERAPIST

## 2018-12-07 PROCEDURE — 00000146 ZZHCL STATISTIC GLUCOSE BY METER IP

## 2018-12-07 PROCEDURE — 85027 COMPLETE CBC AUTOMATED: CPT | Performed by: INTERNAL MEDICINE

## 2018-12-07 PROCEDURE — 25000132 ZZH RX MED GY IP 250 OP 250 PS 637: Mod: GY | Performed by: INTERNAL MEDICINE

## 2018-12-07 PROCEDURE — 97161 PT EVAL LOW COMPLEX 20 MIN: CPT | Mod: GP | Performed by: PHYSICAL THERAPIST

## 2018-12-07 PROCEDURE — 25000131 ZZH RX MED GY IP 250 OP 636 PS 637: Mod: GY | Performed by: INTERNAL MEDICINE

## 2018-12-07 PROCEDURE — 36415 COLL VENOUS BLD VENIPUNCTURE: CPT | Performed by: INTERNAL MEDICINE

## 2018-12-07 PROCEDURE — 12000007 ZZH R&B INTERMEDIATE

## 2018-12-07 PROCEDURE — 25000128 H RX IP 250 OP 636: Performed by: INTERNAL MEDICINE

## 2018-12-07 PROCEDURE — 80202 ASSAY OF VANCOMYCIN: CPT | Performed by: INTERNAL MEDICINE

## 2018-12-07 PROCEDURE — 80048 BASIC METABOLIC PNL TOTAL CA: CPT | Performed by: INTERNAL MEDICINE

## 2018-12-07 RX ORDER — PANTOPRAZOLE SODIUM 40 MG/1
40 TABLET, DELAYED RELEASE ORAL DAILY
Status: DISCONTINUED | OUTPATIENT
Start: 2018-12-07 | End: 2018-12-07

## 2018-12-07 RX ADMIN — HYDRALAZINE HYDROCHLORIDE 25 MG: 25 TABLET ORAL at 09:41

## 2018-12-07 RX ADMIN — CARVEDILOL 12.5 MG: 12.5 TABLET, FILM COATED ORAL at 09:41

## 2018-12-07 RX ADMIN — ATORVASTATIN CALCIUM 40 MG: 40 TABLET, FILM COATED ORAL at 21:09

## 2018-12-07 RX ADMIN — CARVEDILOL 12.5 MG: 12.5 TABLET, FILM COATED ORAL at 17:42

## 2018-12-07 RX ADMIN — HYDRALAZINE HYDROCHLORIDE 25 MG: 25 TABLET ORAL at 21:09

## 2018-12-07 RX ADMIN — ISOSORBIDE MONONITRATE 120 MG: 60 TABLET, EXTENDED RELEASE ORAL at 21:10

## 2018-12-07 RX ADMIN — GABAPENTIN 300 MG: 300 CAPSULE ORAL at 21:10

## 2018-12-07 RX ADMIN — ABACAVIR 600 MG: 300 TABLET, FILM COATED ORAL at 21:09

## 2018-12-07 RX ADMIN — GUAIFENESIN 600 MG: 600 TABLET, EXTENDED RELEASE ORAL at 09:41

## 2018-12-07 RX ADMIN — TAZOBACTAM SODIUM AND PIPERACILLIN SODIUM 2.25 G: 250; 2 INJECTION, SOLUTION INTRAVENOUS at 00:20

## 2018-12-07 RX ADMIN — DOLUTEGRAVIR SODIUM 50 MG: 50 TABLET, FILM COATED ORAL at 21:13

## 2018-12-07 RX ADMIN — TAZOBACTAM SODIUM AND PIPERACILLIN SODIUM 2.25 G: 250; 2 INJECTION, SOLUTION INTRAVENOUS at 15:08

## 2018-12-07 RX ADMIN — ASPIRIN 81 MG: 81 TABLET, COATED ORAL at 09:41

## 2018-12-07 RX ADMIN — Medication 400 MG: at 21:09

## 2018-12-07 RX ADMIN — TAZOBACTAM SODIUM AND PIPERACILLIN SODIUM 2.25 G: 250; 2 INJECTION, SOLUTION INTRAVENOUS at 21:13

## 2018-12-07 RX ADMIN — IRBESARTAN 300 MG: 75 TABLET ORAL at 21:10

## 2018-12-07 RX ADMIN — INSULIN ASPART 2 UNITS: 100 INJECTION, SOLUTION INTRAVENOUS; SUBCUTANEOUS at 12:49

## 2018-12-07 RX ADMIN — TAZOBACTAM SODIUM AND PIPERACILLIN SODIUM 2.25 G: 250; 2 INJECTION, SOLUTION INTRAVENOUS at 06:38

## 2018-12-07 RX ADMIN — GUAIFENESIN 600 MG: 600 TABLET, EXTENDED RELEASE ORAL at 21:09

## 2018-12-07 RX ADMIN — RITONAVIR 100 MG: 100 TABLET, FILM COATED ORAL at 21:13

## 2018-12-07 RX ADMIN — GABAPENTIN 300 MG: 300 CAPSULE ORAL at 09:41

## 2018-12-07 RX ADMIN — INSULIN GLARGINE 25 UNITS: 100 INJECTION, SOLUTION SUBCUTANEOUS at 09:41

## 2018-12-07 RX ADMIN — CLOPIDOGREL 75 MG: 75 TABLET, FILM COATED ORAL at 21:10

## 2018-12-07 RX ADMIN — HYDRALAZINE HYDROCHLORIDE 25 MG: 25 TABLET ORAL at 16:07

## 2018-12-07 RX ADMIN — DARUNAVIR 800 MG: 800 TABLET, FILM COATED ORAL at 21:14

## 2018-12-07 RX ADMIN — INSULIN ASPART 2 UNITS: 100 INJECTION, SOLUTION INTRAVENOUS; SUBCUTANEOUS at 17:38

## 2018-12-07 RX ADMIN — MIRTAZAPINE 15 MG: 15 TABLET, FILM COATED ORAL at 21:10

## 2018-12-07 RX ADMIN — DAPSONE 100 MG: 100 TABLET ORAL at 21:09

## 2018-12-07 RX ADMIN — VANCOMYCIN HYDROCHLORIDE 1500 MG: 10 INJECTION, POWDER, LYOPHILIZED, FOR SOLUTION INTRAVENOUS at 12:08

## 2018-12-07 RX ADMIN — INSULIN GLARGINE 25 UNITS: 100 INJECTION, SOLUTION SUBCUTANEOUS at 21:13

## 2018-12-07 RX ADMIN — PANTOPRAZOLE SODIUM 40 MG: 40 TABLET, DELAYED RELEASE ORAL at 06:38

## 2018-12-07 RX ADMIN — ACETAMINOPHEN 650 MG: 325 TABLET, FILM COATED ORAL at 14:03

## 2018-12-07 ASSESSMENT — ACTIVITIES OF DAILY LIVING (ADL)
ADLS_ACUITY_SCORE: 15
ADLS_ACUITY_SCORE: 15
ADLS_ACUITY_SCORE: 14
ADLS_ACUITY_SCORE: 15
ADLS_ACUITY_SCORE: 18
ADLS_ACUITY_SCORE: 14

## 2018-12-07 ASSESSMENT — PAIN DESCRIPTION - DESCRIPTORS
DESCRIPTORS: PRESSURE
DESCRIPTORS: PRESSURE

## 2018-12-07 NOTE — PLAN OF CARE
Problem: Patient Care Overview  Goal: Plan of Care/Patient Progress Review  A&O. Denies pain. Temp 99.4. IV SL. 0200 BG  112. Alicia steady with 2 staff for transfers. LS coarse O2 93% on 3 L.

## 2018-12-07 NOTE — PROGRESS NOTES
12/07/18 1335   Quick Adds   Type of Visit Initial PT Evaluation   Living Environment   Lives With child(jean), adult;sibling(s);spouse   Living Arrangements house   Home Accessibility bed and bath are not on the first floor;stairs to enter home;stairs within home   Number of Stairs to Enter Home 1   Number of Stairs Within Home 5   Stair Railings at Home inside, present on right side   Self-Care   Usual Activity Tolerance good   Regular Exercise no   Equipment Currently Used at Home cane, straight;shower chair;grab bar   Activity/Exercise/Self-Care Comment Pt typically does drive but not to dialysis   Functional Level Prior   Ambulation 1-->assistive equipment  (out of the house uses SEC)   Transferring 0-->independent   Toileting 0-->independent   Bathing 1-->assistive equipment  (Shower chair)   Dressing 1-->assistive equipment  (ocassional assist)   Eating 1-->assistive equipment   Communication 0-->understands/communicates without difficulty   Swallowing 0-->swallows foods/liquids without difficulty   Cognition 0 - no cognition issues reported   Fall history within last six months yes   Number of times patient has fallen within last six months 3   General Information   Onset of Illness/Injury or Date of Surgery - Date 12/05/18   Referring Physician  Her   Patient/Family Goals Statement Pt hoping to D/C home   Pertinent History of Current Problem (include personal factors and/or comorbidities that impact the POC) This is a 68-year-old gentleman with a history of end-stage renal disease on hemodialysis Tuesdays, Thursdays, Saturdays; HIV with CD4 count greater than 400 per the patient; hyperlipidemia; coronary artery disease with prior PCI; prior history of TIA, who gets periodic blood transfusions every 3-4 months, came for evaluation for generalized weakness and fever. Also had mild headaches.    Precautions/Limitations fall precautions   General Info Comments Pt agreeable to PT.   Cognitive Status  "Examination   Orientation person;place  (thought it was January)   Level of Consciousness alert   Follows Commands and Answers Questions 100% of the time;able to follow multistep instructions   Pain Assessment   Patient Currently in Pain Yes, see Vital Sign flowsheet   Posture    Posture Forward head position;Protracted shoulders   Range of Motion (ROM)   ROM Comment WFL B LEs   Strength   Strength Comments 3-/5 with B LEs   Bed Mobility   Bed Mobility Comments min A for repositioning in bed   Transfer Skills   Transfer Comments min A for sit to stand from chair   Gait   Gait Comments unable to perform more than 3-4 steps to bed, feeling weak and fatigued with this   Balance   Balance Comments good static balance but needing FWW for stability   General Therapy Interventions   Planned Therapy Interventions balance training;bed mobility training;gait training;strengthening;transfer training;risk factor education;home program guidelines;progressive activity/exercise   Clinical Impression   Criteria for Skilled Therapeutic Intervention yes, treatment indicated   PT Diagnosis decreased functional mobility    Influenced by the following impairments decreased strength   Functional limitations due to impairments decreased transfers, ambulation, bed mob   Clinical Presentation Stable/Uncomplicated   Clinical Presentation Rationale improving since admit- was using yanna steady yesterday now A x 1   Clinical Decision Making (Complexity) Low complexity   Therapy Frequency` daily   Predicted Duration of Therapy Intervention (days/wks) 3 days   Anticipated Discharge Disposition Transitional Care Facility   Risk & Benefits of therapy have been explained Yes   Patient, Family & other staff in agreement with plan of care Yes   [employer] AM-PAC TM \"6 Clicks\"   2016, Trustees of [employer], under license to [employer].  All rights reserved.   6 Clicks Short Forms Basic Mobility Inpatient Short Form   [employer] " "AM-PAC  \"6 Clicks\" V.2 Basic Mobility Inpatient Short Form   1. Turning from your back to your side while in a flat bed without using bedrails? 3 - A Little   2. Moving from lying on your back to sitting on the side of a flat bed without using bedrails? 3 - A Little   3. Moving to and from a bed to a chair (including a wheelchair)? 3 - A Little   4. Standing up from a chair using your arms (e.g., wheelchair, or bedside chair)? 3 - A Little   5. To walk in hospital room? 2 - A Lot   6. Climbing 3-5 steps with a railing? 2 - A Lot   Basic Mobility Raw Score (Score out of 24.Lower scores equate to lower levels of function) 16   Total Evaluation Time   Total Evaluation Time (Minutes) 10     "

## 2018-12-07 NOTE — PROGRESS NOTES
[facility]     Renal Progress Note       SHORTHAND KEY FOR MY NOTES:  c = with, s = without, p = after, a = before, x = except, asx = asymptomatic, tx = transplant or treatment, sx = symptoms or symptomatic, cx = canceled or culture, rxn = reaction, yday = yesterday, nl = normal, abx = antibiotics, fxn = function, dx = diagnosis, dz = disease, m/h = melena/hematochezia, c/d/l/ha = cramping/dizziness/lightheadedness/headache, d/c = discharge or diarrhea/constipation, f/c/n/v = fevers/chills/nausea/vomiting, cp/sob = chest pain/shortness of breath.         Assessment/Plan:     1.  ESKD.  Pt is due to run tmrw.  He has fluid to remove so will try for another 4L tmrw over 4h.  Pt is agreeable.  A.  HD tmrw per TRS schedule.    2.  Pneumonia.  Pt is much better, clinically, today.  Afebrile.  A.  Continue supp o2 prn.  B.  Abx at proper renal doses.    3.  Anemia.  Hb is trending down again.  No obv bleeding.  He has required multiple transfusions before.  A.  Follow hb, clinically.  B.  Check type and screen in case he needs blood again tmrw.        Interval History:     Pt feels well and has no complaints today.  He feels much better c his breathing, so he is still weak.  No significant f/c/n/v.  He is eating ok right now.         Medications and Allergies:       - MEDICATION INSTRUCTIONS for Dialysis Patients -   Does not apply See Admin Instructions     abacavir  600 mg Oral QPM     aspirin  81 mg Oral Daily     atorvastatin  40 mg Oral At Bedtime     carvedilol  12.5 mg Oral BID w/meals     clopidogrel (PLAVIX) tablet 75 mg  75 mg Oral QPM     dapsone  100 mg Oral At Bedtime     darunavir  800 mg Oral At Bedtime     dolutegravir  50 mg Oral At Bedtime     gabapentin  300 mg Oral BID     guaiFENesin  600 mg Oral BID     hydrALAZINE  25 mg Oral TID     insulin aspart  1-7 Units Subcutaneous TID AC     insulin aspart  1-5 Units Subcutaneous At Bedtime     insulin glargine  25 Units Subcutaneous BID      "irbesartan  300 mg Oral At Bedtime     isosorbide mononitrate  120 mg Oral QPM     magnesium oxide (MAG-OX) tablet 400 mg  400 mg Oral At Bedtime     mirtazapine  15 mg Oral At Bedtime     pantoprazole  40 mg Oral Daily     piperacillin-tazobactam  2.25 g Intravenous Q6H     ritonavir  100 mg Oral At Bedtime     sucroferric oxyhydroxide  500 mg Oral TID w/meals     vancomycin place koenig - receiving intermittent dosing  1 each Intravenous See Admin Instructions     Allergies   Allergen Reactions     Calcium Acetate Other (See Comments)     Other reaction(s): Other, see comments  Pain in chest and back  Pain in chest area (sensitive skin)      Diagnostic X-Ray Materials Other (See Comments)     PN: renal failure     Lisinopril      Sulfa Drugs           Physical Exam:     Vitals were reviewed    Heart Rate: 71, Blood pressure 131/61, temperature 96.7  F (35.9  C), temperature source Oral, resp. rate 18, height 1.803 m (5' 11\"), weight 91.8 kg (202 lb 4.8 oz), SpO2 94 %.  Wt Readings from Last 3 Encounters:   12/06/18 91.8 kg (202 lb 4.8 oz)   09/07/18 86.9 kg (191 lb 8 oz)   07/14/18 86.6 kg (190 lb 14.7 oz)     Intake/Output Summary (Last 24 hours) at 12/07/18 1440  Last data filed at 12/07/18 0842   Gross per 24 hour   Intake             1050 ml   Output             4000 ml   Net            -2950 ml     GENERAL APPEARANCE: pleasant, NAD, alert, sitting up in chair  HEENT:  Eyes/ears/nose/neck grossly normal  RESP: lungs c some basilar crackles, rhonchi; diminished bs  CV: RRR, nl S1/S2  ABDOMEN: o/s/nt/nd  EXTREMITIES/SKIN: no significant ble edema  OTHER:  + LAF c good thrill/bruit, + aneurysmal         Data:     CBC RESULTS:     Recent Labs  Lab 12/07/18  0622 12/06/18  0933 12/05/18  2140   WBC 5.2 6.6 6.4   RBC 2.74* 2.84* 3.03*   HGB 7.7* 8.1* 8.6*   HCT 25.7* 26.2* 28.2*   PLT 85* 96* 128*     Basic Metabolic Panel:    Recent Labs  Lab 12/07/18  0622 12/06/18  0933 12/05/18  2140    138 137   POTASSIUM " 4.2 4.4 4.4   CHLORIDE 102 103 100   CO2 30 24 26   BUN 39* 69* 61*   CR 6.59* 9.63* 8.49*   * 152* 195*   PRABHA 9.0 8.9 9.4     INR  Recent Labs  Lab 12/05/18  2214   INR 1.03      Attestation:   I have reviewed today's relevant vital signs, notes, medications, labs and imaging.    Bipin Chaves MD  [facility] - Nephrology  [phone]

## 2018-12-07 NOTE — PLAN OF CARE
Problem: Patient Care Overview  Goal: Plan of Care/Patient Progress Review  PT: PT eval completed, but limited. Pt lives with wife, daughter and brother in split level home. Pt typically ambulatory with [facility] for outside of the home, pt reports he does own ADLs(some help at times with shoes). Pt reports he continues to drive but not to dialysis because he tends to feel fatigued following.   Discharge Planner PT   Patient plan for discharge: not stated, hoping to go home but reports has been to rehab in the past  Current status: Pt very fatigued. Pt had walked with RN to BR earlier in the day with FWW. Pt currently up in chair. Pt agreeable to attempting ambulation.Pt cued for sit to stand, unable to perform successfully on his own despite cues. Pt needing mod A for performance.Pt was cued for ambulation- unable to ambulate more than 3-4 steps as pt reporting excessive fatigue. VSS. On 1 liter of O2. Pt cued for return to supine, CGA.   Barriers to return to prior living situation: limited ambulation tolerance, needing A with transfers  Recommendations for discharge: TCU, will monitor closely for progression as was using yanna steady yesterday and improving to A x 1 today.   Rationale for recommendations: Pt currently low tolerance to ambulation, needing A for transfers. Pt much below his baseline as typically ambulates without FWW in the home. Pt would benefit from continued PT to progress tolerance to ambulation and transfers.        Entered by: Colleen Sanz 12/07/2018 2:35 PM

## 2018-12-07 NOTE — PLAN OF CARE
Problem: Renal Insufficiency Comorbidity  Goal: Renal Insufficiency  Patient comorbidity will be monitored for signs and symptoms of Renal Insufficiency (Chronic) condition.  Problems will be absent, minimized or managed by discharge/transition of care.   Outcome: No Change  Pt VSS, afebrile.  O2 sats mid 90's on 3L.  Lungs coarse, especially in the bases.  FRANCO.  Had HD today, see their note.  Very sleepy since HD, but was able to take his  meds and eat some supper.  Sera steady and a of 2 to get pt from w/c to bed, pt did well.  Denies pain.  Cont on IV Zosyn and IV Vanco for PNA.   and 192, did not require sliding scale insulin.  POC reviewed with pt, questions answered.

## 2018-12-07 NOTE — PHARMACY-VANCOMYCIN DOSING SERVICE
Pharmacy Vancomycin Note  Date of Service 2018  Patient's  1950   68 year old, male    Indication: Sepsis  Goal Trough Level: 15-20 mg/L  Day of Therapy: 3.  Pt is also on zosyn.  Pt on antivirals as well for HIV.    WBC 5.2 today, blood and urine cx NGTD.  Influenza negative.    Current Vancomycin regimen:  Intermittent dosing in chronic HD pt.  Pt received one time vanco dose in ER on 18    Current estimated CrCl = Estimated Creatinine Clearance: 12.1 mL/min (based on Cr of 6.59).    Creatinine for last 3 days  2018:  9:40 PM Creatinine 8.49 mg/dL  2018:  9:33 AM Creatinine 9.63 mg/dL  2018:  6:22 AM Creatinine 6.59 mg/dL    Recent Vancomycin Levels (past 3 days)  2018:  6:22 AM Vancomycin Level 13.7 mg/L    Vancomycin IV Administrations (past 72 hours)                   vancomycin (VANCOCIN) 2,000 mg in sodium chloride 0.9 % 500 mL intermittent infusion (mg) 2,000 mg Given 18 2254                Nephrotoxins and other renal medications (Future)    Start     Dose/Rate Route Frequency Ordered Stop    18 1100  vancomycin (VANCOCIN) 1,500 mg in sodium chloride 0.9 % 250 mL intermittent infusion      1,500 mg  over 90 Minutes Intravenous ONCE 18 1039      18 0400  piperacillin-tazobactam (ZOSYN) infusion 2.25 g     Comments:  Pharmacy can adjust dose based on renal function.    2.25 g  100 mL/hr over 30 Minutes Intravenous EVERY 6 HOURS 18 0125      18 0157  vancomycin place koenig - receiving intermittent dosing      1 each Intravenous SEE ADMIN INSTRUCTIONS 18 0157               Contrast Orders - past 72 hours     None          Interpretation of levels and current regimen:  Random level of 13.7 indicated redosing indicated today.    Has serum creatinine changed > 50% in last 72 hours: N/A, chronic HD pt    Urine output:  Chronic HD pt    Renal Function: ESRD on Dialysis    Plan:  1.  redose 1500mg x 1 today.  check random level in  am (pre-HD)  2.  Pharmacy will check trough levels as appropriate in 1-3 Days.    3. Serum creatinine levels will be ordered per MD in chronic HD pt.      Amie Anthony        .

## 2018-12-08 LAB
ABO + RH BLD: NORMAL
ABO + RH BLD: NORMAL
ALBUMIN SERPL-MCNC: 2.8 G/DL (ref 3.4–5)
ANION GAP SERPL CALCULATED.3IONS-SCNC: 10 MMOL/L (ref 3–14)
BLD GP AB SCN SERPL QL: NORMAL
BLOOD BANK CMNT PATIENT-IMP: NORMAL
BUN SERPL-MCNC: 56 MG/DL (ref 7–30)
CALCIUM SERPL-MCNC: 9.3 MG/DL (ref 8.5–10.1)
CHLORIDE SERPL-SCNC: 99 MMOL/L (ref 94–109)
CO2 SERPL-SCNC: 27 MMOL/L (ref 20–32)
CREAT SERPL-MCNC: 8.38 MG/DL (ref 0.66–1.25)
ERYTHROCYTE [DISTWIDTH] IN BLOOD BY AUTOMATED COUNT: 16.1 % (ref 10–15)
GFR SERPL CREATININE-BSD FRML MDRD: 6 ML/MIN/1.7M2
GLUCOSE BLDC GLUCOMTR-MCNC: 102 MG/DL (ref 70–99)
GLUCOSE BLDC GLUCOMTR-MCNC: 128 MG/DL (ref 70–99)
GLUCOSE BLDC GLUCOMTR-MCNC: 149 MG/DL (ref 70–99)
GLUCOSE BLDC GLUCOMTR-MCNC: 188 MG/DL (ref 70–99)
GLUCOSE BLDC GLUCOMTR-MCNC: 227 MG/DL (ref 70–99)
GLUCOSE BLDC GLUCOMTR-MCNC: 79 MG/DL (ref 70–99)
GLUCOSE SERPL-MCNC: 83 MG/DL (ref 70–99)
HCT VFR BLD AUTO: 24.4 % (ref 40–53)
HGB BLD-MCNC: 7.4 G/DL (ref 13.3–17.7)
MCH RBC QN AUTO: 28.1 PG (ref 26.5–33)
MCHC RBC AUTO-ENTMCNC: 30.3 G/DL (ref 31.5–36.5)
MCV RBC AUTO: 93 FL (ref 78–100)
PHOSPHATE SERPL-MCNC: 6.7 MG/DL (ref 2.5–4.5)
PLATELET # BLD AUTO: 92 10E9/L (ref 150–450)
POTASSIUM SERPL-SCNC: 3.9 MMOL/L (ref 3.4–5.3)
RBC # BLD AUTO: 2.63 10E12/L (ref 4.4–5.9)
SODIUM SERPL-SCNC: 136 MMOL/L (ref 133–144)
SPECIMEN EXP DATE BLD: NORMAL
VANCOMYCIN SERPL-MCNC: 26.8 MG/L
WBC # BLD AUTO: 4.2 10E9/L (ref 4–11)

## 2018-12-08 PROCEDURE — 25000128 H RX IP 250 OP 636: Performed by: INTERNAL MEDICINE

## 2018-12-08 PROCEDURE — 00000146 ZZHCL STATISTIC GLUCOSE BY METER IP

## 2018-12-08 PROCEDURE — 25000132 ZZH RX MED GY IP 250 OP 250 PS 637: Mod: GY | Performed by: INTERNAL MEDICINE

## 2018-12-08 PROCEDURE — A9270 NON-COVERED ITEM OR SERVICE: HCPCS | Mod: GY | Performed by: INTERNAL MEDICINE

## 2018-12-08 PROCEDURE — 12000007 ZZH R&B INTERMEDIATE

## 2018-12-08 PROCEDURE — 36415 COLL VENOUS BLD VENIPUNCTURE: CPT | Performed by: INTERNAL MEDICINE

## 2018-12-08 PROCEDURE — 90937 HEMODIALYSIS REPEATED EVAL: CPT

## 2018-12-08 PROCEDURE — 99233 SBSQ HOSP IP/OBS HIGH 50: CPT | Performed by: INTERNAL MEDICINE

## 2018-12-08 PROCEDURE — 86901 BLOOD TYPING SEROLOGIC RH(D): CPT | Performed by: INTERNAL MEDICINE

## 2018-12-08 PROCEDURE — 80069 RENAL FUNCTION PANEL: CPT | Performed by: INTERNAL MEDICINE

## 2018-12-08 PROCEDURE — 86850 RBC ANTIBODY SCREEN: CPT | Performed by: INTERNAL MEDICINE

## 2018-12-08 PROCEDURE — 86900 BLOOD TYPING SEROLOGIC ABO: CPT | Performed by: INTERNAL MEDICINE

## 2018-12-08 PROCEDURE — 80202 ASSAY OF VANCOMYCIN: CPT | Performed by: INTERNAL MEDICINE

## 2018-12-08 PROCEDURE — 25000131 ZZH RX MED GY IP 250 OP 636 PS 637: Mod: GY | Performed by: INTERNAL MEDICINE

## 2018-12-08 PROCEDURE — 85027 COMPLETE CBC AUTOMATED: CPT | Performed by: INTERNAL MEDICINE

## 2018-12-08 PROCEDURE — 63400005 ZZH RX 634: Performed by: INTERNAL MEDICINE

## 2018-12-08 RX ORDER — ALBUMIN (HUMAN) 12.5 G/50ML
50 SOLUTION INTRAVENOUS
Status: DISCONTINUED | OUTPATIENT
Start: 2018-12-08 | End: 2018-12-08

## 2018-12-08 RX ORDER — VANCOMYCIN HYDROCHLORIDE 1 G/200ML
1000 INJECTION, SOLUTION INTRAVENOUS ONCE
Status: COMPLETED | OUTPATIENT
Start: 2018-12-08 | End: 2018-12-09

## 2018-12-08 RX ORDER — ALBUMIN, HUMAN INJ 5% 5 %
250 SOLUTION INTRAVENOUS
Status: DISCONTINUED | OUTPATIENT
Start: 2018-12-08 | End: 2018-12-08

## 2018-12-08 RX ADMIN — INSULIN ASPART 2 UNITS: 100 INJECTION, SOLUTION INTRAVENOUS; SUBCUTANEOUS at 18:24

## 2018-12-08 RX ADMIN — Medication 400 MG: at 21:31

## 2018-12-08 RX ADMIN — ASPIRIN 81 MG: 81 TABLET, COATED ORAL at 13:33

## 2018-12-08 RX ADMIN — ERYTHROPOIETIN 10000 UNITS: 10000 INJECTION, SOLUTION INTRAVENOUS; SUBCUTANEOUS at 12:14

## 2018-12-08 RX ADMIN — INSULIN GLARGINE 25 UNITS: 100 INJECTION, SOLUTION SUBCUTANEOUS at 13:30

## 2018-12-08 RX ADMIN — GABAPENTIN 300 MG: 300 CAPSULE ORAL at 13:33

## 2018-12-08 RX ADMIN — CARVEDILOL 12.5 MG: 12.5 TABLET, FILM COATED ORAL at 18:22

## 2018-12-08 RX ADMIN — TAZOBACTAM SODIUM AND PIPERACILLIN SODIUM 2.25 G: 250; 2 INJECTION, SOLUTION INTRAVENOUS at 21:35

## 2018-12-08 RX ADMIN — TAZOBACTAM SODIUM AND PIPERACILLIN SODIUM 2.25 G: 250; 2 INJECTION, SOLUTION INTRAVENOUS at 18:21

## 2018-12-08 RX ADMIN — HYDRALAZINE HYDROCHLORIDE 25 MG: 25 TABLET ORAL at 21:31

## 2018-12-08 RX ADMIN — CARVEDILOL 12.5 MG: 12.5 TABLET, FILM COATED ORAL at 13:32

## 2018-12-08 RX ADMIN — GUAIFENESIN 600 MG: 600 TABLET, EXTENDED RELEASE ORAL at 21:31

## 2018-12-08 RX ADMIN — PANTOPRAZOLE SODIUM 40 MG: 40 TABLET, DELAYED RELEASE ORAL at 06:41

## 2018-12-08 RX ADMIN — CLOPIDOGREL 75 MG: 75 TABLET, FILM COATED ORAL at 19:48

## 2018-12-08 RX ADMIN — ISOSORBIDE MONONITRATE 120 MG: 60 TABLET, EXTENDED RELEASE ORAL at 19:48

## 2018-12-08 RX ADMIN — TAZOBACTAM SODIUM AND PIPERACILLIN SODIUM 2.25 G: 250; 2 INJECTION, SOLUTION INTRAVENOUS at 03:00

## 2018-12-08 RX ADMIN — HYDRALAZINE HYDROCHLORIDE 25 MG: 25 TABLET ORAL at 13:32

## 2018-12-08 RX ADMIN — DOLUTEGRAVIR SODIUM 50 MG: 50 TABLET, FILM COATED ORAL at 21:34

## 2018-12-08 RX ADMIN — DAPSONE 100 MG: 100 TABLET ORAL at 21:31

## 2018-12-08 RX ADMIN — ABACAVIR 600 MG: 300 TABLET, FILM COATED ORAL at 19:47

## 2018-12-08 RX ADMIN — MIRTAZAPINE 15 MG: 15 TABLET, FILM COATED ORAL at 21:31

## 2018-12-08 RX ADMIN — GABAPENTIN 300 MG: 300 CAPSULE ORAL at 21:31

## 2018-12-08 RX ADMIN — GUAIFENESIN 600 MG: 600 TABLET, EXTENDED RELEASE ORAL at 13:33

## 2018-12-08 RX ADMIN — IRBESARTAN 300 MG: 75 TABLET ORAL at 21:31

## 2018-12-08 RX ADMIN — ATORVASTATIN CALCIUM 40 MG: 40 TABLET, FILM COATED ORAL at 21:31

## 2018-12-08 RX ADMIN — VANCOMYCIN HYDROCHLORIDE 1000 MG: 1 INJECTION, SOLUTION INTRAVENOUS at 16:40

## 2018-12-08 RX ADMIN — RITONAVIR 100 MG: 100 TABLET, FILM COATED ORAL at 21:34

## 2018-12-08 RX ADMIN — SODIUM CHLORIDE 250 ML: 9 INJECTION, SOLUTION INTRAVENOUS at 08:25

## 2018-12-08 RX ADMIN — INSULIN GLARGINE 25 UNITS: 100 INJECTION, SOLUTION SUBCUTANEOUS at 21:37

## 2018-12-08 RX ADMIN — DARUNAVIR 800 MG: 800 TABLET, FILM COATED ORAL at 21:34

## 2018-12-08 ASSESSMENT — ACTIVITIES OF DAILY LIVING (ADL)
ADLS_ACUITY_SCORE: 14

## 2018-12-08 NOTE — PHARMACY-VANCOMYCIN DOSING SERVICE
Pharmacy Vancomycin Note  Date of Service 2018  Patient's  1950   68 year old, male    Indication: Sepsis  Goal Trough Level: 15-20 mg/L  Day of Therapy: 4  Current Vancomycin regimen:  Intermittent dosing in HD pt    Current estimated CrCl = Estimated Creatinine Clearance: 9.5 mL/min (based on Cr of 8.38).    Creatinine for last 3 days  2018:  9:40 PM Creatinine 8.49 mg/dL  2018:  9:33 AM Creatinine 9.63 mg/dL  2018:  6:22 AM Creatinine 6.59 mg/dL  2018:  7:02 AM Creatinine 8.38 mg/dL    Recent Vancomycin Levels (past 3 days)  2018:  6:22 AM Vancomycin Level 13.7 mg/L  2018:  7:02 AM Vancomycin Level 26.8 mg/L    Vancomycin IV Administrations (past 72 hours)                   vancomycin (VANCOCIN) 1,500 mg in sodium chloride 0.9 % 250 mL intermittent infusion (mg) 1,500 mg New Bag 18 1208                Nephrotoxins and other renal medications (Future)    Start     Dose/Rate Route Frequency Ordered Stop    18 1400  vancomycin (VANCOCIN) 1000 mg in dextrose 5% 200 mL PREMIX      1,000 mg  200 mL/hr over 1 Hours Intravenous ONCE 18 1238      18 0400  piperacillin-tazobactam (ZOSYN) infusion 2.25 g     Comments:  Pharmacy can adjust dose based on renal function.    2.25 g  100 mL/hr over 30 Minutes Intravenous EVERY 6 HOURS 18 0125      18 0157  vancomycin place koenig - receiving intermittent dosing      1 each Intravenous SEE ADMIN INSTRUCTIONS 18 0157               Contrast Orders - past 72 hours     None          Interpretation of levels and current regimen:  Trough level is  Supratherapeutic-per protocol, due to redose after HD run today    Has serum creatinine changed > 50% in last 72 hours: chronic HD pt    Urine output:  Chronic HD pt    Renal Function: ESRD on Dialysis    Plan:  1.  Level prior to HD 26.8, per protocol, redose 10 mg/kg (1000mg) post-HD today.  2.  Pharmacy will check trough levels as appropriate in 3-5  Days.  (prior to next HD run)  3. Serum creatinine levels will be ordered per MD in HD pt.      Amie Anthony        .

## 2018-12-08 NOTE — PROGRESS NOTES
[facility]  Hospitalist Progress Note  Name: Donald Raza    MRN: [id number]  YOB: 1950    Age: 68 year old  Date of admission: 12/5/2018  Primary care provider: Aida Thomas      Reason for Stay (Diagnosis): Acute sepsis         Assessment and Plan:      Summary of Stay:  This is a 68-year-old gentleman with a history of end-stage renal disease on hemodialysis Tuesdays, Thursdays, Saturdays; HIV with CD4 count greater than 400 per the patient; hyperlipidemia; coronary artery disease with prior PCI; prior history of TIA, who gets periodic blood transfusions every 3-4 months, came for evaluation for generalized weakness, cough, and fever. Also had mild headaches.  He did have some clinical evidence of acute sepsis which was suspected to be secondary to viral etiology but cannot rule out atypical bacteria or bacterial infections given his immune suppressed state.    Problem List/Plan:  1. Acute sepsis as evident by fever, lactic acidosis, and notable source of infection: Suspect viral in etiology but cannot rule out atypical bacterial infections in a patient who is chronically immune suppressed.  Influenza a and B were negative.  Chest x-ray on presentation did demonstrate what appears to be bibasilar atelectasis but cannot rule out aspiration or infection.  Urine culture also growing out strep group C/G.  Discontinue vancomycin but we will continue Zosyn.  2. History of HIV: Continue his antiretroviral drugs  3. End-stage kidney disease: Chronically on hemodialysis.  Schedules are Tuesdays, Thursdays, and Saturdays.  4. History of chronic anemia: Does require periodic transfusions.  Monitor for now.  5. Type 2 diabetes: Continue his Lantus 25 units subcu twice daily.  He is to take scheduled Humalog 15 units 3 times daily with meals along with a sliding scale but blood glucose seems to be reasonable.  Only continue medium intensity sliding scale for  "now.  6. History of coronary artery disease status post PCI: Continue baby aspirin, atorvastatin, Plavix, carvedilol, hydralazine, and Imdur.  7. Generalized weakness: Secondary to problem 1 requiring 2 person assist and Alicia steady to transfer.  Will request PT consultation      DVT Prophylaxis: Pneumatic Compression Devices  Code Status: Full Code  Discharge Dispo: PT input appreciated.  Recommending TCU at this point.  We will also request OT and social work consultation  Estimated Disch Date / # of Days until Disch: We will plan on Monday.    Time spent: Greater than 35 minutes.      Interval History (Subjective):      Still feels generally weak but no other complaints.  Seen during dialysis session.         Physical Exam:      Vital signs:  Temp: 97.1  F (36.2  C) Temp src: Oral BP: 157/68 Pulse: 75 Heart Rate: 75 Resp: 18 SpO2: 94 % O2 Device: Nasal cannula Oxygen Delivery: 1 LPM Height: 180.3 cm (5' 11\") Weight: 91.8 kg (202 lb 6.1 oz)  Estimated body mass index is 28.23 kg/(m^2) as calculated from the following:    Height as of this encounter: 1.803 m (5' 11\").    Weight as of this encounter: 91.8 kg (202 lb 6.1 oz).    I/O last 3 completed shifts:  In: 400 [P.O.:400]  Out: 4225 [Urine:25; Other:4200]  Vitals:    12/06/18 0136 12/06/18 1753 12/08/18 0744   Weight: 91.8 kg (202 lb 4.8 oz) 91.8 kg (202 lb 4.8 oz) 91.8 kg (202 lb 6.1 oz)       Constitutional: Awake, alert, cooperative, but ill-appearing.  Otherwise, no apparent distress   Respiratory: Nl work of breathing.  Diminished breath sounds at the bases   Cardiovascular: Regular rate and rhythm, normal S1 and S2, and no murmur noted   Abdomen: Normal bowel sounds, soft, non-distended, non-tender   Skin: No rashes, no cyanosis, dry to touch   Neuro: CN 2-12 intact, no localizing weakness   Extremities: No edema, normal range of motion   HEENT Normocephalic, atraumatic, normal nasal turbinates; oropharynx clear   Neck Supple; nl inspection; trachea " midline; no thryomegaly   Psychiatric: A+O x3. Normal affect          Medications:      All current medications were reviewed with changes reflected in problem list.         Data:      All new lab and imaging data was reviewed.   Labs:    Recent Labs  Lab 12/06/18 0920 12/05/18 2213 12/05/18  2140   CULT 10,000 to 50,000 colonies/mLStreptococcus dysgalactiae serogroup C/GSusceptibility testing in progress*  10,000 to 50,000 colonies/mLmixed urogenital subhash No growth after 2 days No growth after 2 days       Recent Labs  Lab 12/08/18  0702 12/07/18  0622 12/06/18  0933   WBC 4.2 5.2 6.6   HGB 7.4* 7.7* 8.1*   HCT 24.4* 25.7* 26.2*   MCV 93 94 92   PLT 92* 85* 96*       Recent Labs  Lab 12/08/18 0702 12/07/18 0622 12/06/18 0933 12/05/18  2140    140 138 137   POTASSIUM 3.9 4.2 4.4 4.4   CHLORIDE 99 102 103 100   CO2 27 30 24 26   ANIONGAP 10 8 11 11   GLC 83 114* 152* 195*   BUN 56* 39* 69* 61*   CR 8.38* 6.59* 9.63* 8.49*   GFRESTIMATED 6* 8* 5* 6*   GFRESTBLACK 8* 10* 7* 8*   PRABHA 9.3 9.0 8.9 9.4   PHOS 6.7*  --   --   --    PROTTOTAL  --   --   --  8.1   ALBUMIN 2.8*  --   --  3.8   BILITOTAL  --   --   --  0.6   ALKPHOS  --   --   --  139   AST  --   --   --  15   ALT  --   --   --  13       Recent Labs  Lab 12/06/18 0920   COLOR Yellow   APPEARANCE Clear   URINEGLC >499*   URINEBILI Negative   URINEKETONE Negative   SG 1.008   UBLD Negative   URINEPH 9.0*   PROTEIN >499*   NITRITE Negative   LEUKEST Negative   RBCU 1   WBCU 3      Imaging:   No results found for this or any previous visit (from the past 24 hour(s)).    Jose De Jesus Orozco -[phone]

## 2018-12-08 NOTE — PROGRESS NOTES
Dialysis Run: Pt arrived to dialysis in wheelchair, time out taken  Previous Hep B status of Patient on machine verified by me   Pt ran for 4 hours on a K3 for 2 hours and K4 for 2 hours  with a net fluid removal of 4.2 L  A BF of 350 was maintained via LAVF which was cannulated with 16 gauge needles without difficulty'  Meds :epo  Complications:None  Hemostasis og needles sites achieved after 8 minutes dressing dry and intact  Pt was seen by Dr Bradford during run  Aseptic prep both on and off procedure.  Procedure and ESRD discussed and all questions answered  VSS post run See EPIC for more details  Water detection monitor on floor during run  Pt dialyzes at [place]  TTAT  Pre Weight  91.8    Post Weight   87.6 kg     Potassium   Date Value Ref Range Status   12/08/2018 3.9 3.4 - 5.3 mmol/L Final   ]  Hemoglobin   Date Value Ref Range Status   12/08/2018 7.4 (L) 13.3 - 17.7 g/dL Final   ]  Creatinine   Date Value Ref Range Status   12/08/2018 8.38 (H) 0.66 - 1.25 mg/dL Final   ]      Emerita Ariza RN [facility]

## 2018-12-08 NOTE — PLAN OF CARE
Problem: Patient Care Overview  Goal: Plan of Care/Patient Progress Review  Outcome: No Change  Pt has hemodialysis today.  A-1 with walker/gait belt.  PT following.  IV zosyn. Left arm limb alert.  1L O2 low 90%.  Fatigue.  Hgb 7.7 with labs in am.  .  Pt has loose stools.

## 2018-12-08 NOTE — PLAN OF CARE
Problem: Patient Care Overview  Goal: Plan of Care/Patient Progress Review  PT- attempted was at dialysis..

## 2018-12-08 NOTE — PLAN OF CARE
Problem: Patient Care Overview  Goal: Plan of Care/Patient Progress Review    VSS ex O2 weaned to 1L, 86% on RA after activity. Posterior neck pressure improved with tylenol and heating pad. Ambulated to bathroom/up in chair with A1 and walker. PT consulted. Plan for dialysis tomorrow. Will continue to monitor.

## 2018-12-08 NOTE — PROGRESS NOTES
Assessment and Plan:   ESRD: HD today for UF and clearance. Ran 4 h with net UF of 4.2 L .  via LAF. Ran on 3K and 4K, EPO given during the run.  Continue HD T Th S while inpatient. Tolerated procedure well without complications.             Interval History:   HIV infection  ASCVD, Anemia  ? Viral infection: febrile illness. ON zosyn and vanco.  DM                   Review of Systems:   Feels well. No sx on dialysis.           Medications:       - MEDICATION INSTRUCTIONS for Dialysis Patients -   Does not apply See Admin Instructions     abacavir  600 mg Oral QPM     aspirin  81 mg Oral Daily     atorvastatin  40 mg Oral At Bedtime     carvedilol  12.5 mg Oral BID w/meals     clopidogrel (PLAVIX) tablet 75 mg  75 mg Oral QPM     dapsone  100 mg Oral At Bedtime     darunavir  800 mg Oral At Bedtime     dolutegravir  50 mg Oral At Bedtime     gabapentin  300 mg Oral BID     guaiFENesin  600 mg Oral BID     hydrALAZINE  25 mg Oral TID     insulin aspart  1-7 Units Subcutaneous TID AC     insulin aspart  1-5 Units Subcutaneous At Bedtime     insulin glargine  25 Units Subcutaneous BID     irbesartan  300 mg Oral At Bedtime     isosorbide mononitrate  120 mg Oral QPM     magnesium oxide (MAG-OX) tablet 400 mg  400 mg Oral At Bedtime     mirtazapine  15 mg Oral At Bedtime     pantoprazole  40 mg Oral Daily     piperacillin-tazobactam  2.25 g Intravenous Q6H     ritonavir  100 mg Oral At Bedtime     sucroferric oxyhydroxide  500 mg Oral TID w/meals     vancomycin (VANCOCIN) IV  1,000 mg Intravenous Once     vancomycin place koenig - receiving intermittent dosing  1 each Intravenous See Admin Instructions         Current active medications and PTA medications reviewed, see medication list for details.            Physical Exam:   Vitals were reviewed  Patient Vitals for the past 24 hrs:   BP Temp Temp src Pulse Heart Rate Resp SpO2 Weight   12/08/18 1332 166/70 - - - 74 - - -   12/08/18 1240 157/74 97.7  F  (36.5  C) - - 75 18 96 % -   18 1230 146/72 97.7  F (36.5  C) Oral - 75 - - -   18 1225 140/75 - - - 72 - - -   18 1215 126/72 - - 75 - - - -   18 1200 (!) 151/92 - - 75 - - - -   18 1145 140/68 - - 72 - - - -   18 1130 154/81 - - 74 - - - -   18 1115 152/85 - - - 70 - - -   18 1100 162/81 - - - 72 - - -   18 1045 155/81 - - - 71 - - -   18 1030 166/77 - - - 75 - - -   18 1015 (!) 191/94 - - - 71 - - -   18 1000 155/78 - - - 72 - - -   18 0945 123/74 - - - 71 - - -   18 0930 148/78 - - - 72 - - -   18 0915 (!) 169/92 - - - 69 - - -   18 0900 170/84 - - - 70 - - -   18 0845 176/84 - - - 71 - - -   18 0830 174/78 - - - 74 - - -   18 0820 175/80 - - - 80 - - -   18 0813 168/74 98.8  F (37.1  C) Oral - 72 20 96 % -   18 0755 179/84 97.4  F (36.3  C) Oral - 77 16 94 % -   18 0744 - - - - - - - 91.8 kg (202 lb 6.1 oz)   18 0419 159/65 98.8  F (37.1  C) Oral - 75 20 91 % -   18 2316 151/65 97  F (36.1  C) Oral - 75 16 91 % -   18 2058 170/76 98.7  F (37.1  C) Oral - 71 16 94 % -   18 1741 139/62 - - - 74 - - -   18 1609 - - - - - - 93 % -   18 1530 146/67 98  F (36.7  C) Oral - 70 18 - -       Temp:  [97  F (36.1  C)-98.8  F (37.1  C)] 97.7  F (36.5  C)  Pulse:  [72-75] 75  Heart Rate:  [69-80] 74  Resp:  [16-20] 18  BP: (123-191)/(62-94) 166/70  SpO2:  [91 %-96 %] 96 %    Temperatures:  Current - Temp: 97.7  F (36.5  C); Max - Temp  Av  F (36.7  C)  Min: 97  F (36.1  C)  Max: 98.8  F (37.1  C)  Respiration range: Resp  Av.7  Min: 16  Max: 20  Pulse range: Pulse  Av  Min: 72  Max: 75  Blood pressure range: Systolic (24hrs), Av , Min:123 , Max:191   ; Diastolic (24hrs), Av, Min:62, Max:94    Pulse oximetry range: SpO2  Av.6 %  Min: 91 %  Max: 96 %    I/O last 3 completed shifts:  In: 1330 [P.O.:1330]  Out: 25  [Urine:25]      Intake/Output Summary (Last 24 hours) at 12/08/18 1349  Last data filed at 12/08/18 1240   Gross per 24 hour   Intake              400 ml   Output             4225 ml   Net            -3825 ml       Alert, sitting up in chair  LAF with good bruit and pulse  Lungs clear BS  Cor RRR nl S1 S2 no M  LE no edema       Wt Readings from Last 4 Encounters:   12/08/18 91.8 kg (202 lb 6.1 oz)   09/07/18 86.9 kg (191 lb 8 oz)   07/14/18 86.6 kg (190 lb 14.7 oz)   04/11/18 85.7 kg (189 lb)          Data:          Lab Results   Component Value Date     12/08/2018     12/07/2018     12/06/2018    Lab Results   Component Value Date    CHLORIDE 99 12/08/2018    CHLORIDE 102 12/07/2018    CHLORIDE 103 12/06/2018    Lab Results   Component Value Date    BUN 56 12/08/2018    BUN 39 12/07/2018    BUN 69 12/06/2018      Lab Results   Component Value Date    POTASSIUM 3.9 12/08/2018    POTASSIUM 4.2 12/07/2018    POTASSIUM 4.4 12/06/2018    Lab Results   Component Value Date    CO2 27 12/08/2018    CO2 30 12/07/2018    CO2 24 12/06/2018    Lab Results   Component Value Date    CR 8.38 12/08/2018    CR 6.59 12/07/2018    CR 9.63 12/06/2018        Recent Labs   Lab Test  12/08/18   0702  12/07/18   0622  12/06/18   0933   WBC  4.2  5.2  6.6   HGB  7.4*  7.7*  8.1*   HCT  24.4*  25.7*  26.2*   MCV  93  94  92   PLT  92*  85*  96*     Recent Labs   Lab Test  12/05/18   2140  09/06/18   0622  09/01/18   1146   07/06/14   2255   AST  15  16  19   < >  18   ALT  13  14  19   < >  17   ALKPHOS  139  124  146   < >  132   BILITOTAL  0.6  0.4  0.5   < >  0.5   BILICONJ   --    --    --    --   0.0    < > = values in this interval not displayed.       Recent Labs   Lab Test  09/01/18   1146  12/27/17   0720  10/10/17   2225   MAG  2.1  2.2  1.9     Recent Labs   Lab Test  12/08/18   0702  09/08/18   0557  09/01/18   1146   PHOS  6.7*  6.2*  3.3     Recent Labs   Lab Test  12/08/18   0702  12/07/18   0622   12/06/18   0933   PRABHA  9.3  9.0  8.9       Lab Results   Component Value Date    PRABHA 9.3 12/08/2018     Lab Results   Component Value Date    WBC 4.2 12/08/2018    HGB 7.4 (L) 12/08/2018    HCT 24.4 (L) 12/08/2018    MCV 93 12/08/2018    PLT 92 (L) 12/08/2018     Lab Results   Component Value Date     12/08/2018    POTASSIUM 3.9 12/08/2018    CHLORIDE 99 12/08/2018    CO2 27 12/08/2018    GLC 83 12/08/2018     Lab Results   Component Value Date    BUN 56 (H) 12/08/2018    CR 8.38 (H) 12/08/2018     Lab Results   Component Value Date    MAG 2.1 09/01/2018     Lab Results   Component Value Date    PHOS 6.7 (H) 12/08/2018       Creatinine   Date Value Ref Range Status   12/08/2018 8.38 (H) 0.66 - 1.25 mg/dL Final   12/07/2018 6.59 (H) 0.66 - 1.25 mg/dL Final   12/06/2018 9.63 (H) 0.66 - 1.25 mg/dL Final   12/05/2018 8.49 (H) 0.66 - 1.25 mg/dL Final   09/08/2018 7.63 (H) 0.66 - 1.25 mg/dL Final   09/07/2018 5.56 (H) 0.66 - 1.25 mg/dL Final       Attestation:  I have reviewed today's vital signs, notes, medications, labs and imaging.     Eric Bradford MD

## 2018-12-08 NOTE — PLAN OF CARE
Problem: Patient Care Overview  Goal: Plan of Care/Patient Progress Review  Outcome: No Change  A&O, Ax1 with walker, IV Zosyn and vanco (likely stop tomorrow), hgb 7.7, Type and screen in AM, right PIV redressed, hemodialysis T, TH, Sa, fistula with bruit and thrill in left arm, had 2 loose/watery stools this shift, . PT following, voided 25 ml yellow clear urine. Continue to monitor.

## 2018-12-08 NOTE — PLAN OF CARE
Problem: Patient Care Overview  Goal: Plan of Care/Patient Progress Review  Outcome: Improving  VSS A&Ox4. 1 assist w/ walker. Pt went to HD from 6528-3558. 4.3L removed. BG - 79 & 129. HGB 7.4. Vanco 26.8. All meds held and given after HB - Zosyn started but IV infiltrated - waiting for flyer RN to place new one. Will continue to monitor and continue w/ POC.

## 2018-12-08 NOTE — PROGRESS NOTES
[facility]  Hospitalist Progress Note  Name: Donald Raza    MRN: [id number]  YOB: 1950    Age: 68 year old  Date of admission: 12/5/2018  Primary care provider: Aida Thomas      Reason for Stay (Diagnosis): Acute sepsis         Assessment and Plan:      Summary of Stay:  This is a 68-year-old gentleman with a history of end-stage renal disease on hemodialysis Tuesdays, Thursdays, Saturdays; HIV with CD4 count greater than 400 per the patient; hyperlipidemia; coronary artery disease with prior PCI; prior history of TIA, who gets periodic blood transfusions every 3-4 months, came for evaluation for generalized weakness, cough, and fever. Also had mild headaches.  He did have some clinical evidence of acute sepsis which was suspected to be secondary to viral etiology but cannot rule out atypical bacteria or bacterial infections given his immune suppressed state.    Problem List/Plan:  1. Acute sepsis as evident by fever, lactic acidosis, and notable source of infection: Suspect viral in etiology but cannot rule out atypical bacterial infections in a patient who is chronically immune suppressed.  Influenza a and B were negative.  Chest x-ray on presentation did demonstrate what appears to be bibasilar atelectasis but cannot rule out aspiration or infection.  Urine culture also growing out strep group C/G.  Continue vancomycin and Zosyn for now but if blood cultures remain negative, could conceivably stop vancomycin tomorrow.  2. History of HIV: Continue his antiretroviral drugs  3. End-stage kidney disease: Chronically on hemodialysis.  Schedules are Tuesdays, Thursdays, and Saturdays.  4. History of chronic anemia: Does require periodic transfusions.  Will repeat CBC tomorrow and if hemoglobin continues to drop below 7, we will order a unit of packed RBC during his dialysis sessions.  5. Type 2 diabetes: Continue his Lantus 25 units subcu twice daily.  He is to  "take scheduled Humalog 15 units 3 times daily with meals along with a sliding scale but blood glucose seems to be reasonable.  Only utilize a medium intensity sliding scale for now.  6. History of coronary artery disease status post PCI: Continue baby aspirin, atorvastatin, Plavix, carvedilol, hydralazine, and Imdur.  7. Generalized weakness: Secondary to problem 1 requiring 2 person assist and Alicia steady to transfer.  Will request PT consultation      DVT Prophylaxis: Pneumatic Compression Devices  Code Status: Full Code  Discharge Dispo: Home with wife when able but given significant weakness, will depend on how he does with PT  Estimated Disch Date / # of Days until Disch: Suspect 2-3 more days        Interval History (Subjective):      Discussed with nursing staff.  Feeling generally weak and still unwell but overall better than yesterday per patient report.  Occasional cough and reports some congestion.         Physical Exam:      Vital signs:  Temp: 98  F (36.7  C) Temp src: Oral BP: 139/62   Heart Rate: 74 Resp: 18 SpO2: 93 % O2 Device: Nasal cannula Oxygen Delivery: 1 LPM Height: 180.3 cm (5' 11\") Weight: 91.8 kg (202 lb 4.8 oz)  Estimated body mass index is 28.22 kg/(m^2) as calculated from the following:    Height as of this encounter: 1.803 m (5' 11\").    Weight as of this encounter: 91.8 kg (202 lb 4.8 oz).    I/O last 3 completed shifts:  In: 1050 [P.O.:1050]  Out: 4000 [Other:4000]  Vitals:    12/06/18 0136 12/06/18 1753   Weight: 91.8 kg (202 lb 4.8 oz) 91.8 kg (202 lb 4.8 oz)       Constitutional: Awake, alert, cooperative, but ill-appearing.  Otherwise, no apparent distress   Respiratory: Nl work of breathing.  Diminished breath sounds at the bases   Cardiovascular: Regular rate and rhythm, normal S1 and S2, and no murmur noted   Abdomen: Normal bowel sounds, soft, non-distended, non-tender   Skin: No rashes, no cyanosis, dry to touch   Neuro: CN 2-12 intact, no localizing weakness   Extremities: " No edema, normal range of motion   HEENT Normocephalic, atraumatic, normal nasal turbinates; oropharynx clear   Neck Supple; nl inspection; trachea midline; no thryomegaly   Psychiatric: A+O x3. Normal affect          Medications:      All current medications were reviewed with changes reflected in problem list.         Data:      All new lab and imaging data was reviewed.   Labs:    Recent Labs  Lab 12/06/18  0920 12/05/18  2213 12/05/18  2140   CULT 10,000 to 50,000 colonies/mLStreptococcus dysgalactiae serogroup C/G*  Culture in progress No growth after 1 day No growth after 1 day       Recent Labs  Lab 12/07/18  0622 12/06/18  0933 12/05/18  2140   WBC 5.2 6.6 6.4   HGB 7.7* 8.1* 8.6*   HCT 25.7* 26.2* 28.2*   MCV 94 92 93   PLT 85* 96* 128*       Recent Labs  Lab 12/07/18 0622 12/06/18  0933 12/05/18  2140    138 137   POTASSIUM 4.2 4.4 4.4   CHLORIDE 102 103 100   CO2 30 24 26   ANIONGAP 8 11 11   * 152* 195*   BUN 39* 69* 61*   CR 6.59* 9.63* 8.49*   GFRESTIMATED 8* 5* 6*   GFRESTBLACK 10* 7* 8*   PRABHA 9.0 8.9 9.4   PROTTOTAL  --   --  8.1   ALBUMIN  --   --  3.8   BILITOTAL  --   --  0.6   ALKPHOS  --   --  139   AST  --   --  15   ALT  --   --  13       Recent Labs  Lab 12/06/18  0920   COLOR Yellow   APPEARANCE Clear   URINEGLC >499*   URINEBILI Negative   URINEKETONE Negative   SG 1.008   UBLD Negative   URINEPH 9.0*   PROTEIN >499*   NITRITE Negative   LEUKEST Negative   RBCU 1   WBCU 3      Imaging:   No results found for this or any previous visit (from the past 24 hour(s)).    Jose De Jesus Orozco -[phone]

## 2018-12-09 ENCOUNTER — APPOINTMENT (OUTPATIENT)
Dept: PHYSICAL THERAPY | Facility: CLINIC | Age: 68
DRG: 974 | End: 2018-12-09
Payer: MEDICARE

## 2018-12-09 LAB
BACTERIA SPEC CULT: ABNORMAL
BACTERIA SPEC CULT: ABNORMAL
GLUCOSE BLDC GLUCOMTR-MCNC: 113 MG/DL (ref 70–99)
GLUCOSE BLDC GLUCOMTR-MCNC: 120 MG/DL (ref 70–99)
GLUCOSE BLDC GLUCOMTR-MCNC: 145 MG/DL (ref 70–99)
GLUCOSE BLDC GLUCOMTR-MCNC: 173 MG/DL (ref 70–99)
GLUCOSE BLDC GLUCOMTR-MCNC: 187 MG/DL (ref 70–99)
HGB BLD-MCNC: 8.3 G/DL (ref 13.3–17.7)
Lab: ABNORMAL
SPECIMEN SOURCE: ABNORMAL

## 2018-12-09 PROCEDURE — 40000193 ZZH STATISTIC PT WARD VISIT

## 2018-12-09 PROCEDURE — 12000007 ZZH R&B INTERMEDIATE

## 2018-12-09 PROCEDURE — 00000146 ZZHCL STATISTIC GLUCOSE BY METER IP

## 2018-12-09 PROCEDURE — 99232 SBSQ HOSP IP/OBS MODERATE 35: CPT | Performed by: INTERNAL MEDICINE

## 2018-12-09 PROCEDURE — 97116 GAIT TRAINING THERAPY: CPT | Mod: GP

## 2018-12-09 PROCEDURE — 25000131 ZZH RX MED GY IP 250 OP 636 PS 637: Mod: GY | Performed by: INTERNAL MEDICINE

## 2018-12-09 PROCEDURE — 97110 THERAPEUTIC EXERCISES: CPT | Mod: GP

## 2018-12-09 PROCEDURE — A9270 NON-COVERED ITEM OR SERVICE: HCPCS | Mod: GY | Performed by: INTERNAL MEDICINE

## 2018-12-09 PROCEDURE — 36415 COLL VENOUS BLD VENIPUNCTURE: CPT | Performed by: INTERNAL MEDICINE

## 2018-12-09 PROCEDURE — 25000132 ZZH RX MED GY IP 250 OP 250 PS 637: Mod: GY | Performed by: INTERNAL MEDICINE

## 2018-12-09 PROCEDURE — 25000128 H RX IP 250 OP 636: Performed by: INTERNAL MEDICINE

## 2018-12-09 PROCEDURE — 85018 HEMOGLOBIN: CPT | Performed by: INTERNAL MEDICINE

## 2018-12-09 RX ADMIN — Medication 400 MG: at 21:59

## 2018-12-09 RX ADMIN — HYDRALAZINE HYDROCHLORIDE 25 MG: 25 TABLET ORAL at 15:29

## 2018-12-09 RX ADMIN — ISOSORBIDE MONONITRATE 120 MG: 60 TABLET, EXTENDED RELEASE ORAL at 20:11

## 2018-12-09 RX ADMIN — CARVEDILOL 12.5 MG: 12.5 TABLET, FILM COATED ORAL at 17:52

## 2018-12-09 RX ADMIN — GUAIFENESIN 600 MG: 600 TABLET, EXTENDED RELEASE ORAL at 08:44

## 2018-12-09 RX ADMIN — DAPSONE 100 MG: 100 TABLET ORAL at 21:59

## 2018-12-09 RX ADMIN — GUAIFENESIN 600 MG: 600 TABLET, EXTENDED RELEASE ORAL at 20:11

## 2018-12-09 RX ADMIN — GABAPENTIN 300 MG: 300 CAPSULE ORAL at 08:44

## 2018-12-09 RX ADMIN — PANTOPRAZOLE SODIUM 40 MG: 40 TABLET, DELAYED RELEASE ORAL at 06:34

## 2018-12-09 RX ADMIN — CLOPIDOGREL 75 MG: 75 TABLET, FILM COATED ORAL at 20:11

## 2018-12-09 RX ADMIN — RITONAVIR 100 MG: 100 TABLET, FILM COATED ORAL at 22:00

## 2018-12-09 RX ADMIN — HYDRALAZINE HYDROCHLORIDE 25 MG: 25 TABLET ORAL at 08:44

## 2018-12-09 RX ADMIN — ABACAVIR 600 MG: 300 TABLET, FILM COATED ORAL at 20:17

## 2018-12-09 RX ADMIN — INSULIN GLARGINE 25 UNITS: 100 INJECTION, SOLUTION SUBCUTANEOUS at 20:10

## 2018-12-09 RX ADMIN — TAZOBACTAM SODIUM AND PIPERACILLIN SODIUM 2.25 G: 250; 2 INJECTION, SOLUTION INTRAVENOUS at 14:51

## 2018-12-09 RX ADMIN — DARUNAVIR 800 MG: 800 TABLET, FILM COATED ORAL at 22:00

## 2018-12-09 RX ADMIN — GABAPENTIN 300 MG: 300 CAPSULE ORAL at 20:11

## 2018-12-09 RX ADMIN — MIRTAZAPINE 15 MG: 15 TABLET, FILM COATED ORAL at 21:59

## 2018-12-09 RX ADMIN — CARVEDILOL 12.5 MG: 12.5 TABLET, FILM COATED ORAL at 08:44

## 2018-12-09 RX ADMIN — TAZOBACTAM SODIUM AND PIPERACILLIN SODIUM 2.25 G: 250; 2 INJECTION, SOLUTION INTRAVENOUS at 02:50

## 2018-12-09 RX ADMIN — ASPIRIN 81 MG: 81 TABLET, COATED ORAL at 08:44

## 2018-12-09 RX ADMIN — TAZOBACTAM SODIUM AND PIPERACILLIN SODIUM 2.25 G: 250; 2 INJECTION, SOLUTION INTRAVENOUS at 20:50

## 2018-12-09 RX ADMIN — INSULIN GLARGINE 25 UNITS: 100 INJECTION, SOLUTION SUBCUTANEOUS at 08:44

## 2018-12-09 RX ADMIN — DOLUTEGRAVIR SODIUM 50 MG: 50 TABLET, FILM COATED ORAL at 22:00

## 2018-12-09 RX ADMIN — ATORVASTATIN CALCIUM 40 MG: 40 TABLET, FILM COATED ORAL at 21:59

## 2018-12-09 RX ADMIN — IRBESARTAN 300 MG: 75 TABLET ORAL at 21:59

## 2018-12-09 RX ADMIN — TAZOBACTAM SODIUM AND PIPERACILLIN SODIUM 2.25 G: 250; 2 INJECTION, SOLUTION INTRAVENOUS at 09:46

## 2018-12-09 RX ADMIN — HYDRALAZINE HYDROCHLORIDE 25 MG: 25 TABLET ORAL at 21:59

## 2018-12-09 RX ADMIN — INSULIN ASPART 1 UNITS: 100 INJECTION, SOLUTION INTRAVENOUS; SUBCUTANEOUS at 12:52

## 2018-12-09 ASSESSMENT — ACTIVITIES OF DAILY LIVING (ADL)
ADLS_ACUITY_SCORE: 15
ADLS_ACUITY_SCORE: 19

## 2018-12-09 NOTE — PROGRESS NOTES
Care Transition Initial Assessment -   Reason For Consult: discharge planning  Met with: Patient   Active Problems:    Sepsis (H)       DATA  Lives With: spouse and one adult daughter  Living Arrangements: house  Description of Support System: Supportive, Involved  Who is your support system?: Wife, adult child  Support Assessment: Adequate family and caregiver support.  Identified issues/concerns regarding health management:  Concerns regarding finances as well as worsening health.                     ASSESSMENT  Cognitive Status: pleasant, articulate  Concerns to be addressed: adjustment regarding changes in health and inability to continue employment.     PLAN  Financial costs for the patient includes If patient agrees to go to TCU he would not be able to afford cost of private room and does not want to share a room.  Patient states he only receives Social Security and he has no other resources for income.  Patient given options and choices for discharge. Patient offered inpatient rehab which was declined.   In stead pt requesting home PT/OT upon discharge.   Patient  Yes   Patient Goals and Preferences: Patient wants to return home where he lives with his wife and adult daughter.  Patient is willing to have PT/OT in his home as he is homebound.   Patient anticipates discharging to:  Home with PT/OT.

## 2018-12-09 NOTE — PLAN OF CARE
Problem: Patient Care Overview  Goal: Plan of Care/Patient Progress Review  Outcome: Improving    Bp's slightly elevated, all other VSS. A&O. On 1L O2 via NC, sating appropriately. No complaints of pain this shift. No N/V. , and 188. 2 unit(s) of insulin given. HD done today 4.3 liters removed. On IV zosyn for + UA.  Hgb 7.4, orders to transfuse if left than 7. Cr 8.38. Plan for discharge Monday possibly to a TCU. Will continue to monitor.

## 2018-12-09 NOTE — PROGRESS NOTES
[facility]  Hospitalist Progress Note  Name: Donald Raza    MRN: [id number]  YOB: 1950    Age: 68 year old  Date of admission: 12/5/2018  Primary care provider: Aida Thomas      Reason for Stay (Diagnosis): Acute sepsis         Assessment and Plan:      Summary of Stay:  This is a 68-year-old gentleman with a history of end-stage renal disease on hemodialysis Tuesdays, Thursdays, Saturdays; HIV with CD4 count greater than 400 per the patient; hyperlipidemia; coronary artery disease with prior PCI; prior history of TIA, who gets periodic blood transfusions every 3-4 months, came for evaluation for generalized weakness, cough, and fever. Also had mild headaches.  He did have some clinical evidence of acute sepsis which was suspected to be secondary to viral etiology but cannot rule out atypical bacteria or bacterial infections given his immune suppressed state.    Problem List/Plan:  1. Acute sepsis as evident by fever, lactic acidosis, and notable source of infection: Suspect viral in etiology but cannot rule out atypical bacterial infections in a patient who is chronically immune suppressed.  Influenza a and B were negative.  Chest x-ray on presentation did demonstrate what appears to be bibasilar atelectasis but cannot rule out aspiration or infection.  Urine culture also growing out strep group C/G.  Continue Zosyn and transition to p.o. Augmentin tomorrow.  2. History of HIV: Continue his antiretroviral drugs  3. End-stage kidney disease: Chronically on hemodialysis.  Schedules are Tuesdays, Thursdays, and Saturdays.  4. History of chronic anemia: Does require periodic transfusions.  Monitor for now.  5. Type 2 diabetes: Continue his Lantus 25 units subcu twice daily.  He is to take scheduled Humalog 15 units 3 times daily with meals along with a sliding scale but blood glucose seems to be reasonable.  Only continue medium intensity sliding scale for  "now.  6. History of coronary artery disease status post PCI: Continue baby aspirin, atorvastatin, Plavix, carvedilol, hydralazine, and Imdur.  7. Generalized weakness: Improved and was able to ambulate with standby assist today to the bathroom.  Patient preferred to go home instead of TCU.      DVT Prophylaxis: Pneumatic Compression Devices  Code Status: Full Code  Discharge Dispo: Hopefully home with wife and home health care RN/PT/OT.  Estimated Disch Date / # of Days until Disch: Tomorrow if respiratory status is stable.        Interval History (Subjective):      Feels stronger and breathing continues to improve.  No significant cough or phlegm.         Physical Exam:      Vital signs:  Temp: 96  F (35.6  C) Temp src: Oral BP: 165/74 Pulse: 72 Heart Rate: 71 Resp: 18 SpO2: 93 % O2 Device: None (Room air) Oxygen Delivery: 1 LPM Height: 180.3 cm (5' 11\") Weight: 91.8 kg (202 lb 6.1 oz)  Estimated body mass index is 28.23 kg/m  as calculated from the following:    Height as of this encounter: 1.803 m (5' 11\").    Weight as of this encounter: 91.8 kg (202 lb 6.1 oz).    I/O last 3 completed shifts:  In: 780 [P.O.:780]  Out: -   Vitals:    12/06/18 0136 12/06/18 1753 12/08/18 0744   Weight: 91.8 kg (202 lb 4.8 oz) 91.8 kg (202 lb 4.8 oz) 91.8 kg (202 lb 6.1 oz)       Constitutional: Awake, alert, cooperative, but ill-appearing.  Otherwise, no apparent distress   Respiratory: Nl work of breathing.  Diminished breath sounds at the bases   Cardiovascular: Regular rate and rhythm, normal S1 and S2, and no murmur noted   Abdomen: Normal bowel sounds, soft, non-distended, non-tender   Skin: No rashes, no cyanosis, dry to touch   Neuro: CN 2-12 intact, no localizing weakness   Extremities: No edema, normal range of motion   HEENT Normocephalic, atraumatic, normal nasal turbinates; oropharynx clear   Neck Supple; nl inspection; trachea midline; no thryomegaly   Psychiatric: A+O x3. Normal affect          Medications:      All " current medications were reviewed with changes reflected in problem list.         Data:      All new lab and imaging data was reviewed.   Labs:  Recent Labs   Lab 12/06/18  0920 12/05/18  2213 12/05/18  2140   CULT 10,000 to 50,000 colonies/mL  Streptococcus dysgalactiae serogroup C/G  *  10,000 to 50,000 colonies/mL  mixed urogenital subhash   No growth after 3 days No growth after 3 days     Recent Labs   Lab 12/09/18  0647 12/08/18  0702 12/07/18  0622 12/06/18  0933   WBC  --  4.2 5.2 6.6   HGB 8.3* 7.4* 7.7* 8.1*   HCT  --  24.4* 25.7* 26.2*   MCV  --  93 94 92   PLT  --  92* 85* 96*     Recent Labs   Lab 12/08/18  0702 12/07/18  0622 12/06/18  0933 12/05/18  2140    140 138 137   POTASSIUM 3.9 4.2 4.4 4.4   CHLORIDE 99 102 103 100   CO2 27 30 24 26   ANIONGAP 10 8 11 11   GLC 83 114* 152* 195*   BUN 56* 39* 69* 61*   CR 8.38* 6.59* 9.63* 8.49*   GFRESTIMATED 6* 8* 5* 6*   GFRESTBLACK 8* 10* 7* 8*   PRABHA 9.3 9.0 8.9 9.4   PHOS 6.7*  --   --   --    PROTTOTAL  --   --   --  8.1   ALBUMIN 2.8*  --   --  3.8   BILITOTAL  --   --   --  0.6   ALKPHOS  --   --   --  139   AST  --   --   --  15   ALT  --   --   --  13     Recent Labs   Lab 12/06/18 0920   COLOR Yellow   APPEARANCE Clear   URINEGLC >499*   URINEBILI Negative   URINEKETONE Negative   SG 1.008   UBLD Negative   URINEPH 9.0*   PROTEIN >499*   NITRITE Negative   LEUKEST Negative   RBCU 1   WBCU 3      Imaging:   No results found for this or any previous visit (from the past 24 hour(s)).    Jose De Jesus Orozco -[phone]

## 2018-12-09 NOTE — PLAN OF CARE
Problem: Patient Care Overview  Goal: Plan of Care/Patient Progress Review  Discharge Planner PT   Patient plan for discharge: Home  Current status: Alert, but tangential with thoughts. Supine to sit with SBA. Sit to/from stand with CGA. Multiple attempts needed to stand from the bed. Ambulates 175' with FWW and CGA. Pt demonstrates decreased gait speed, variable step length and intermittent forward flexed posture. Tolerates seated/standing exercises with rest breaks.   Barriers to return to prior living situation: Impaired balance, fall risk, decreased activity tolerance, questionable cognition/safety, fall history  Recommendations for discharge: TCU. Pt currently declining. If pt refuses; recommend FWW with all mobility, SBA on all stair performance, home PT/OT services.   Rationale for recommendations: Pt currently demonstrating impaired balance and decreased activity tolerance. Would benefit from rehab in the TCU setting        Entered by: Ofelia Last 12/09/2018 12:48 PM         Recommend pt to ambulate 3x/day outside of therapy to improve activity tolerance and strength.

## 2018-12-09 NOTE — PLAN OF CARE
Crackles in left lung base. No edema. VSS Up with SBA and walker. Patient desires to discharge home with home care.Denies pain and SOB.

## 2018-12-09 NOTE — DOWNTIME EVENT NOTE
The EMR was down for 5 1/2 hours on 12/9/2018.     was responsible for completing the paper charting during this time period.     The following information was re-entered into the system by Maricruz Cueva: MAR    The following information will remain in the paper chart: vitals, assessment, I&Os    Maricruz Cueva  12/9/2018

## 2018-12-09 NOTE — PLAN OF CARE
Vitals Q4hrs.  No pain, n/v.  Possible discharge Monday to TCU.  IV ZOSYN.  1L O2 97%.  Left are fistula.  HD yesterday 4.3L removed.  Hgb 7.4, if less than 7 needs transfusion.  OT and SW consulting.

## 2018-12-10 ENCOUNTER — APPOINTMENT (OUTPATIENT)
Dept: PHYSICAL THERAPY | Facility: CLINIC | Age: 68
DRG: 974 | End: 2018-12-10
Payer: MEDICARE

## 2018-12-10 LAB
ANION GAP SERPL CALCULATED.3IONS-SCNC: 6 MMOL/L (ref 3–14)
BUN SERPL-MCNC: 43 MG/DL (ref 7–30)
CALCIUM SERPL-MCNC: 9.3 MG/DL (ref 8.5–10.1)
CHLORIDE SERPL-SCNC: 98 MMOL/L (ref 94–109)
CO2 SERPL-SCNC: 30 MMOL/L (ref 20–32)
CREAT SERPL-MCNC: 8.06 MG/DL (ref 0.66–1.25)
ERYTHROCYTE [DISTWIDTH] IN BLOOD BY AUTOMATED COUNT: 15.3 % (ref 10–15)
GFR SERPL CREATININE-BSD FRML MDRD: 7 ML/MIN/1.7M2
GLUCOSE BLDC GLUCOMTR-MCNC: 127 MG/DL (ref 70–99)
GLUCOSE BLDC GLUCOMTR-MCNC: 134 MG/DL (ref 70–99)
GLUCOSE BLDC GLUCOMTR-MCNC: 172 MG/DL (ref 70–99)
GLUCOSE BLDC GLUCOMTR-MCNC: 194 MG/DL (ref 70–99)
GLUCOSE SERPL-MCNC: 100 MG/DL (ref 70–99)
HCT VFR BLD AUTO: 27.8 % (ref 40–53)
HGB BLD-MCNC: 8.2 G/DL (ref 13.3–17.7)
MCH RBC QN AUTO: 27.8 PG (ref 26.5–33)
MCHC RBC AUTO-ENTMCNC: 29.5 G/DL (ref 31.5–36.5)
MCV RBC AUTO: 94 FL (ref 78–100)
PLATELET # BLD AUTO: 111 10E9/L (ref 150–450)
POTASSIUM SERPL-SCNC: 4.3 MMOL/L (ref 3.4–5.3)
RBC # BLD AUTO: 2.95 10E12/L (ref 4.4–5.9)
SODIUM SERPL-SCNC: 134 MMOL/L (ref 133–144)
WBC # BLD AUTO: 4.4 10E9/L (ref 4–11)

## 2018-12-10 PROCEDURE — 97116 GAIT TRAINING THERAPY: CPT | Mod: GP | Performed by: PHYSICAL THERAPY ASSISTANT

## 2018-12-10 PROCEDURE — 12000007 ZZH R&B INTERMEDIATE

## 2018-12-10 PROCEDURE — A9270 NON-COVERED ITEM OR SERVICE: HCPCS | Mod: GY | Performed by: INTERNAL MEDICINE

## 2018-12-10 PROCEDURE — 85027 COMPLETE CBC AUTOMATED: CPT | Performed by: INTERNAL MEDICINE

## 2018-12-10 PROCEDURE — 40000193 ZZH STATISTIC PT WARD VISIT: Performed by: PHYSICAL THERAPY ASSISTANT

## 2018-12-10 PROCEDURE — 25000132 ZZH RX MED GY IP 250 OP 250 PS 637: Mod: GY | Performed by: INTERNAL MEDICINE

## 2018-12-10 PROCEDURE — 99232 SBSQ HOSP IP/OBS MODERATE 35: CPT | Performed by: INTERNAL MEDICINE

## 2018-12-10 PROCEDURE — 36415 COLL VENOUS BLD VENIPUNCTURE: CPT | Performed by: INTERNAL MEDICINE

## 2018-12-10 PROCEDURE — 80048 BASIC METABOLIC PNL TOTAL CA: CPT | Performed by: INTERNAL MEDICINE

## 2018-12-10 PROCEDURE — 25000131 ZZH RX MED GY IP 250 OP 636 PS 637: Mod: GY | Performed by: INTERNAL MEDICINE

## 2018-12-10 PROCEDURE — 00000146 ZZHCL STATISTIC GLUCOSE BY METER IP

## 2018-12-10 PROCEDURE — 25000128 H RX IP 250 OP 636: Performed by: INTERNAL MEDICINE

## 2018-12-10 RX ORDER — AMOXICILLIN AND CLAVULANATE POTASSIUM 500; 125 MG/1; MG/1
1 TABLET, FILM COATED ORAL EVERY EVENING
Status: DISCONTINUED | OUTPATIENT
Start: 2018-12-10 | End: 2018-12-13 | Stop reason: HOSPADM

## 2018-12-10 RX ADMIN — GUAIFENESIN 600 MG: 600 TABLET, EXTENDED RELEASE ORAL at 08:26

## 2018-12-10 RX ADMIN — ABACAVIR 600 MG: 300 TABLET, FILM COATED ORAL at 20:54

## 2018-12-10 RX ADMIN — TAZOBACTAM SODIUM AND PIPERACILLIN SODIUM 2.25 G: 250; 2 INJECTION, SOLUTION INTRAVENOUS at 03:10

## 2018-12-10 RX ADMIN — DARUNAVIR 800 MG: 800 TABLET, FILM COATED ORAL at 20:49

## 2018-12-10 RX ADMIN — ASPIRIN 81 MG: 81 TABLET, COATED ORAL at 08:26

## 2018-12-10 RX ADMIN — INSULIN ASPART 1 UNITS: 100 INJECTION, SOLUTION INTRAVENOUS; SUBCUTANEOUS at 18:13

## 2018-12-10 RX ADMIN — CARVEDILOL 12.5 MG: 12.5 TABLET, FILM COATED ORAL at 08:26

## 2018-12-10 RX ADMIN — TAZOBACTAM SODIUM AND PIPERACILLIN SODIUM 2.25 G: 250; 2 INJECTION, SOLUTION INTRAVENOUS at 14:31

## 2018-12-10 RX ADMIN — IRBESARTAN 300 MG: 75 TABLET ORAL at 20:49

## 2018-12-10 RX ADMIN — AMOXICILLIN AND CLAVULANATE POTASSIUM 1 TABLET: 500; 125 TABLET, FILM COATED ORAL at 20:49

## 2018-12-10 RX ADMIN — GABAPENTIN 300 MG: 300 CAPSULE ORAL at 20:54

## 2018-12-10 RX ADMIN — RITONAVIR 100 MG: 100 TABLET, FILM COATED ORAL at 20:46

## 2018-12-10 RX ADMIN — Medication 400 MG: at 20:55

## 2018-12-10 RX ADMIN — INSULIN GLARGINE 25 UNITS: 100 INJECTION, SOLUTION SUBCUTANEOUS at 08:26

## 2018-12-10 RX ADMIN — TAZOBACTAM SODIUM AND PIPERACILLIN SODIUM 2.25 G: 250; 2 INJECTION, SOLUTION INTRAVENOUS at 08:34

## 2018-12-10 RX ADMIN — CARVEDILOL 12.5 MG: 12.5 TABLET, FILM COATED ORAL at 18:10

## 2018-12-10 RX ADMIN — ISOSORBIDE MONONITRATE 120 MG: 60 TABLET, EXTENDED RELEASE ORAL at 20:53

## 2018-12-10 RX ADMIN — HYDRALAZINE HYDROCHLORIDE 25 MG: 25 TABLET ORAL at 20:50

## 2018-12-10 RX ADMIN — DAPSONE 100 MG: 100 TABLET ORAL at 20:55

## 2018-12-10 RX ADMIN — MIRTAZAPINE 15 MG: 15 TABLET, FILM COATED ORAL at 20:55

## 2018-12-10 RX ADMIN — CLOPIDOGREL 75 MG: 75 TABLET, FILM COATED ORAL at 20:55

## 2018-12-10 RX ADMIN — ATORVASTATIN CALCIUM 40 MG: 40 TABLET, FILM COATED ORAL at 20:53

## 2018-12-10 RX ADMIN — HYDRALAZINE HYDROCHLORIDE 25 MG: 25 TABLET ORAL at 08:25

## 2018-12-10 RX ADMIN — INSULIN GLARGINE 25 UNITS: 100 INJECTION, SOLUTION SUBCUTANEOUS at 20:46

## 2018-12-10 RX ADMIN — PANTOPRAZOLE SODIUM 40 MG: 40 TABLET, DELAYED RELEASE ORAL at 08:26

## 2018-12-10 RX ADMIN — DOLUTEGRAVIR SODIUM 50 MG: 50 TABLET, FILM COATED ORAL at 20:48

## 2018-12-10 RX ADMIN — GUAIFENESIN 600 MG: 600 TABLET, EXTENDED RELEASE ORAL at 20:55

## 2018-12-10 RX ADMIN — HYDRALAZINE HYDROCHLORIDE 25 MG: 25 TABLET ORAL at 15:57

## 2018-12-10 RX ADMIN — GABAPENTIN 300 MG: 300 CAPSULE ORAL at 08:26

## 2018-12-10 ASSESSMENT — ACTIVITIES OF DAILY LIVING (ADL)
ADLS_ACUITY_SCORE: 15

## 2018-12-10 ASSESSMENT — MIFFLIN-ST. JEOR: SCORE: 1690.23

## 2018-12-10 NOTE — PROGRESS NOTES
[facility]  Hospitalist Progress Note  Name: Donald Raza    MRN: [id number]  YOB: 1950    Age: 68 year old  Date of admission: 12/5/2018  Primary care provider: Aida Thomas      Reason for Stay (Diagnosis): Acute sepsis         Assessment and Plan:      Summary of Stay:  This is a 68-year-old gentleman with a history of end-stage renal disease on hemodialysis Tuesdays, Thursdays, Saturdays; HIV with CD4 count greater than 400 per the patient; hyperlipidemia; coronary artery disease with prior PCI; prior history of TIA, who gets periodic blood transfusions every 3-4 months, came for evaluation for generalized weakness, cough, and fever. Also had mild headaches.  He did have some clinical evidence of acute sepsis which was suspected to be secondary to viral etiology but cannot rule out atypical bacteria or bacterial infections given his immune suppressed state.    Problem List/Plan:  1. Acute sepsis as evident by fever, lactic acidosis, and notable source of infection: Suspect viral in etiology but cannot rule out atypical bacterial infections in a patient who is chronically immune suppressed.  Influenza a and B were negative.  Chest x-ray on presentation did demonstrate what appears to be bibasilar atelectasis but cannot rule out aspiration or infection.  Urine culture also growing out strep group C/G.   Will transition to p.o. Augmentin  2. History of HIV: Continue his antiretroviral drugs  3. End-stage kidney disease: Chronically on hemodialysis.  Schedules are Tuesdays, Thursdays, and Saturdays.  4. History of chronic anemia: Does require periodic transfusions.  Monitor for now.  5. Type 2 diabetes: Continue his Lantus 25 units subcu twice daily.  He is to take scheduled Humalog 15 units 3 times daily with meals along with a sliding scale but blood glucose seems to be reasonable.  Continue medium intensity sliding scale for now.  6. History of coronary artery  "disease status post PCI: Continue baby aspirin, atorvastatin, Plavix, carvedilol, hydralazine, and Imdur.  7. Generalized weakness: Improved and was able to ambulate with standby assist today to the bathroom.  Patient preferred to go home instead of TCU.      DVT Prophylaxis: Pneumatic Compression Devices  Code Status: Full Code  Discharge Dispo: Patient still very weak.  Recommending TCU which he is receptive to despite reporting things that he needs to take care at home.  Estimated Disch Date / # of Days until Disch: Tomorrow if TCU is available.        Interval History (Subjective):      Feels tired and weak.  Breathing is otherwise okay.         Physical Exam:      Vital signs:  Temp: 96.3  F (35.7  C) Temp src: Oral BP: 138/66 Pulse: 72 Heart Rate: 71 Resp: 18 SpO2: 94 % O2 Device: None (Room air) Oxygen Delivery: 1 LPM Height: 180.3 cm (5' 11\") Weight: 90.8 kg (200 lb 3.2 oz)  Estimated body mass index is 27.92 kg/m  as calculated from the following:    Height as of this encounter: 1.803 m (5' 11\").    Weight as of this encounter: 90.8 kg (200 lb 3.2 oz).    I/O last 3 completed shifts:  In: 960 [P.O.:960]  Out: -   Vitals:    12/06/18 0136 12/06/18 1753 12/08/18 0744 12/10/18 0638   Weight: 91.8 kg (202 lb 4.8 oz) 91.8 kg (202 lb 4.8 oz) 91.8 kg (202 lb 6.1 oz) 90.8 kg (200 lb 3.2 oz)       Constitutional: Awake, alert, cooperative, but ill-appearing.  Otherwise, no apparent distress   Respiratory: Nl work of breathing.  Diminished breath sounds at the bases   Cardiovascular: Regular rate and rhythm, normal S1 and S2, and no murmur noted       Skin: No rashes, no cyanosis, dry to touch   Neuro: CN 2-12 intact, no localizing weakness   Extremities: No edema, normal range of motion   HEENT Normocephalic, atraumatic, normal nasal turbinates; oropharynx clear   Neck Supple; nl inspection; trachea midline; no thryomegaly   Psychiatric: A+O x3. Normal affect          Medications:      All current medications were " reviewed with changes reflected in problem list.         Data:      All new lab and imaging data was reviewed.   Labs:  Recent Labs   Lab 12/06/18 0920 12/05/18 2213 12/05/18  2140   CULT 10,000 to 50,000 colonies/mL  Streptococcus dysgalactiae serogroup C/G  *  10,000 to 50,000 colonies/mL  mixed urogenital subhash   No growth after 4 days No growth after 4 days     Recent Labs   Lab 12/10/18  0547 12/09/18  0647 12/08/18 0702 12/07/18 0622   WBC 4.4  --  4.2 5.2   HGB 8.2* 8.3* 7.4* 7.7*   HCT 27.8*  --  24.4* 25.7*   MCV 94  --  93 94   *  --  92* 85*     Recent Labs   Lab 12/10/18  0547 12/08/18  0702 12/07/18  0622  12/05/18  2140    136 140   < > 137   POTASSIUM 4.3 3.9 4.2   < > 4.4   CHLORIDE 98 99 102   < > 100   CO2 30 27 30   < > 26   ANIONGAP 6 10 8   < > 11   * 83 114*   < > 195*   BUN 43* 56* 39*   < > 61*   CR 8.06* 8.38* 6.59*   < > 8.49*   GFRESTIMATED 7* 6* 8*   < > 6*   GFRESTBLACK 8* 8* 10*   < > 8*   PRABHA 9.3 9.3 9.0   < > 9.4   PHOS  --  6.7*  --   --   --    PROTTOTAL  --   --   --   --  8.1   ALBUMIN  --  2.8*  --   --  3.8   BILITOTAL  --   --   --   --  0.6   ALKPHOS  --   --   --   --  139   AST  --   --   --   --  15   ALT  --   --   --   --  13    < > = values in this interval not displayed.     Recent Labs   Lab 12/06/18 0920   COLOR Yellow   APPEARANCE Clear   URINEGLC >499*   URINEBILI Negative   URINEKETONE Negative   SG 1.008   UBLD Negative   URINEPH 9.0*   PROTEIN >499*   NITRITE Negative   LEUKEST Negative   RBCU 1   WBCU 3      Imaging:   No results found for this or any previous visit (from the past 24 hour(s)).    Jose De Jesus Orozco -[phone]

## 2018-12-10 NOTE — PROGRESS NOTES
[facility]  Hospitalist Progress Note  Name: Donald Raza    MRN: [id number]  YOB: 1950    Age: 68 year old  Date of admission: 12/5/2018  Primary care provider: Aida Thomas      Reason for Stay (Diagnosis): Acute sepsis         Assessment and Plan:      Summary of Stay:  This is a 68-year-old gentleman with a history of end-stage renal disease on hemodialysis Tuesdays, Thursdays, Saturdays; HIV with CD4 count greater than 400 per the patient; hyperlipidemia; coronary artery disease with prior PCI; prior history of TIA, who gets periodic blood transfusions every 3-4 months, came for evaluation for generalized weakness, cough, and fever. Also had mild headaches.  He did have some clinical evidence of acute sepsis which was suspected to be secondary to viral etiology but cannot rule out atypical bacteria or bacterial infections given his immune suppressed state.    Problem List/Plan:  1. Acute sepsis as evident by fever, lactic acidosis, and notable source of infection: Suspect viral in etiology but cannot rule out atypical bacterial infections in a patient who is chronically immune suppressed.  Influenza a and B were negative.  Chest x-ray on presentation did demonstrate what appears to be bibasilar atelectasis but cannot rule out aspiration or infection.  Urine culture also growing out strep group C/G.   Will transition to p.o. Augmentin  2. History of HIV: Continue his antiretroviral drugs  3. End-stage kidney disease: Chronically on hemodialysis.  Schedules are Tuesdays, Thursdays, and Saturdays.  4. History of chronic anemia: Does require periodic transfusions.  Monitor for now.  5. Type 2 diabetes: Continue his Lantus 25 units subcu twice daily.  He is to take scheduled Humalog 15 units 3 times daily with meals along with a sliding scale but blood glucose seems to be reasonable.  Continue medium intensity sliding scale for now.  6. History of coronary artery  "disease status post PCI: Continue baby aspirin, atorvastatin, Plavix, carvedilol, hydralazine, and Imdur.  7. Generalized weakness: Plan on TCU placement at [facility].      DVT Prophylaxis: Pneumatic Compression Devices  Code Status: Full Code  Discharge Dispo: TCU  Estimated Disch Date / # of Days until Disch: Tomorrow if TCU is available.        Interval History (Subjective):      No new complaints         Physical Exam:      Vital signs:  Temp: 97.9  F (36.6  C) Temp src: Axillary BP: 189/86   Heart Rate: 76 Resp: 16 SpO2: 94 % O2 Device: None (Room air) Oxygen Delivery: 1 LPM Height: 180.3 cm (5' 11\") Weight: 90.8 kg (200 lb 3.2 oz)  Estimated body mass index is 27.92 kg/m  as calculated from the following:    Height as of this encounter: 1.803 m (5' 11\").    Weight as of this encounter: 90.8 kg (200 lb 3.2 oz).    I/O last 3 completed shifts:  In: 960 [P.O.:960]  Out: 100 [Urine:100]  Vitals:    12/06/18 0136 12/06/18 1753 12/08/18 0744 12/10/18 0638   Weight: 91.8 kg (202 lb 4.8 oz) 91.8 kg (202 lb 4.8 oz) 91.8 kg (202 lb 6.1 oz) 90.8 kg (200 lb 3.2 oz)       Constitutional: Awake, alert, cooperative, but ill-appearing.  Otherwise, no apparent distress   Respiratory: Nl work of breathing.  Diminished breath sounds at the bases   Cardiovascular: Regular rate and rhythm, normal S1 and S2, and no murmur noted       Skin: No rashes, no cyanosis, dry to touch   Neuro: CN 2-12 intact, no localizing weakness   Extremities: No edema, normal range of motion   HEENT Normocephalic, atraumatic, normal nasal turbinates; oropharynx clear   Neck Supple; nl inspection; trachea midline; no thryomegaly   Psychiatric: A+O x3. Normal affect          Medications:      All current medications were reviewed with changes reflected in problem list.         Data:      All new lab and imaging data was reviewed.   Labs:  Recent Labs   Lab 12/06/18  0920 12/05/18  2213 12/05/18  214   CULT 10,000 to 50,000 " colonies/mL  Streptococcus dysgalactiae serogroup C/G  *  10,000 to 50,000 colonies/mL  mixed urogenital subhash   No growth after 4 days No growth after 4 days     Recent Labs   Lab 12/10/18  0547 12/09/18  0647 12/08/18  0702 12/07/18 0622   WBC 4.4  --  4.2 5.2   HGB 8.2* 8.3* 7.4* 7.7*   HCT 27.8*  --  24.4* 25.7*   MCV 94  --  93 94   *  --  92* 85*     Recent Labs   Lab 12/10/18  0547 12/08/18  0702 12/07/18  0622 12/05/18  2140    136 140   < > 137   POTASSIUM 4.3 3.9 4.2   < > 4.4   CHLORIDE 98 99 102   < > 100   CO2 30 27 30   < > 26   ANIONGAP 6 10 8   < > 11   * 83 114*   < > 195*   BUN 43* 56* 39*   < > 61*   CR 8.06* 8.38* 6.59*   < > 8.49*   GFRESTIMATED 7* 6* 8*   < > 6*   GFRESTBLACK 8* 8* 10*   < > 8*   PRABHA 9.3 9.3 9.0   < > 9.4   PHOS  --  6.7*  --   --   --    PROTTOTAL  --   --   --   --  8.1   ALBUMIN  --  2.8*  --   --  3.8   BILITOTAL  --   --   --   --  0.6   ALKPHOS  --   --   --   --  139   AST  --   --   --   --  15   ALT  --   --   --   --  13    < > = values in this interval not displayed.     Recent Labs   Lab 12/06/18  0920   COLOR Yellow   APPEARANCE Clear   URINEGLC >499*   URINEBILI Negative   URINEKETONE Negative   SG 1.008   UBLD Negative   URINEPH 9.0*   PROTEIN >499*   NITRITE Negative   LEUKEST Negative   RBCU 1   WBCU 3      Imaging:   No results found for this or any previous visit (from the past 24 hour(s)).    Jose De Jesus Orozco -[phone]

## 2018-12-10 NOTE — PLAN OF CARE
Blood pressure elevated at 164/74, 179/80, and 176/80, scheduled blood pressure medication given but did not meet parameters for PRN blood pressure medications. Diminished LS. No complaints of pain. Blood sugars of 173 and 134. Had BM. Slept well throughout the night.

## 2018-12-10 NOTE — CONSULTS
Sw consult completed on 12/9.      Sw followed up with pt regarding TCU placement as he is now open to TCU.    D:  Per record review, pt's discharge recommendation is TCU.    I:  Sw spoke with pt who is open to TCU.  He identified [facility] as a facility he wants referrals sent to.  He said that he does dialysis in [place] so it would be the most convenient facility.  Sw sent the referral to [facility].  Pt said that he will need transport arranged for him to get from the facility to dialysis three times a week.  Pt stated he will need HE wheelchair transport to the facility.    A/P:  Sw will continue with discharge planning and will be available as needed.

## 2018-12-10 NOTE — PLAN OF CARE
Problem: Patient Care Overview  Goal: Plan of Care/Patient Progress Review  Discharge Planner PT   Patient plan for discharge: Home  Current status: PT - Pt amb 225' with ww with step thru gait pattern with SBA . Note pt to amb with kyphotic posture and no SOB noted during or following amb.    PT - Pt transfers supine to/ from sit with supervsion to indep.  Pt is indep with sit to/from stand and bed to bed with ww.   Barriers to return to prior living situation: Impaired balance, fall risk, decreased activity tolerance, questionable cognition/safety, fall history  Recommendations for discharge: TCU. Pt currently declining. If pt refuses; recommend FWW with all mobility, SBA on all stair performance, home PT/OT services. per plan established by the PT.    Rationale for recommendations: Note improvement with transfers and gait requiring SBA.  Pt reporting he would feel better going to rehab for a few days.        Entered by: Barbara Suero 12/10/2018 12:09 PM         Recommend pt to ambulate 3x/day outside of therapy to improve activity tolerance and strength.

## 2018-12-11 LAB
ALBUMIN SERPL-MCNC: 2.8 G/DL (ref 3.4–5)
ANION GAP SERPL CALCULATED.3IONS-SCNC: 10 MMOL/L (ref 3–14)
BUN SERPL-MCNC: 55 MG/DL (ref 7–30)
CALCIUM SERPL-MCNC: 9.2 MG/DL (ref 8.5–10.1)
CHLORIDE SERPL-SCNC: 95 MMOL/L (ref 94–109)
CO2 SERPL-SCNC: 28 MMOL/L (ref 20–32)
CREAT SERPL-MCNC: 9.63 MG/DL (ref 0.66–1.25)
GFR SERPL CREATININE-BSD FRML MDRD: 5 ML/MIN/1.7M2
GLUCOSE BLDC GLUCOMTR-MCNC: 103 MG/DL (ref 70–99)
GLUCOSE BLDC GLUCOMTR-MCNC: 104 MG/DL (ref 70–99)
GLUCOSE BLDC GLUCOMTR-MCNC: 113 MG/DL (ref 70–99)
GLUCOSE BLDC GLUCOMTR-MCNC: 127 MG/DL (ref 70–99)
GLUCOSE BLDC GLUCOMTR-MCNC: 187 MG/DL (ref 70–99)
GLUCOSE BLDC GLUCOMTR-MCNC: 193 MG/DL (ref 70–99)
GLUCOSE SERPL-MCNC: 126 MG/DL (ref 70–99)
HGB BLD-MCNC: 7.9 G/DL (ref 13.3–17.7)
INTERPRETATION ECG - MUSE: NORMAL
PHOSPHATE SERPL-MCNC: 6.1 MG/DL (ref 2.5–4.5)
POTASSIUM SERPL-SCNC: 4.4 MMOL/L (ref 3.4–5.3)
SODIUM SERPL-SCNC: 133 MMOL/L (ref 133–144)
TROPONIN I SERPL-MCNC: 0.03 UG/L (ref 0–0.04)
TROPONIN I SERPL-MCNC: 0.04 UG/L (ref 0–0.04)

## 2018-12-11 PROCEDURE — A9270 NON-COVERED ITEM OR SERVICE: HCPCS | Mod: GY | Performed by: INTERNAL MEDICINE

## 2018-12-11 PROCEDURE — 25000132 ZZH RX MED GY IP 250 OP 250 PS 637: Mod: GY | Performed by: INTERNAL MEDICINE

## 2018-12-11 PROCEDURE — 36415 COLL VENOUS BLD VENIPUNCTURE: CPT | Performed by: INTERNAL MEDICINE

## 2018-12-11 PROCEDURE — 25000128 H RX IP 250 OP 636: Performed by: INTERNAL MEDICINE

## 2018-12-11 PROCEDURE — 85018 HEMOGLOBIN: CPT | Performed by: INTERNAL MEDICINE

## 2018-12-11 PROCEDURE — 84484 ASSAY OF TROPONIN QUANT: CPT | Performed by: INTERNAL MEDICINE

## 2018-12-11 PROCEDURE — 90937 HEMODIALYSIS REPEATED EVAL: CPT

## 2018-12-11 PROCEDURE — 80069 RENAL FUNCTION PANEL: CPT | Performed by: INTERNAL MEDICINE

## 2018-12-11 PROCEDURE — 99233 SBSQ HOSP IP/OBS HIGH 50: CPT | Performed by: INTERNAL MEDICINE

## 2018-12-11 PROCEDURE — 00000146 ZZHCL STATISTIC GLUCOSE BY METER IP

## 2018-12-11 PROCEDURE — 12000007 ZZH R&B INTERMEDIATE

## 2018-12-11 PROCEDURE — 93005 ELECTROCARDIOGRAM TRACING: CPT

## 2018-12-11 PROCEDURE — 63400005 ZZH RX 634: Performed by: INTERNAL MEDICINE

## 2018-12-11 PROCEDURE — 25000131 ZZH RX MED GY IP 250 OP 636 PS 637: Mod: GY | Performed by: INTERNAL MEDICINE

## 2018-12-11 RX ORDER — HYDRALAZINE HYDROCHLORIDE 20 MG/ML
20 INJECTION INTRAMUSCULAR; INTRAVENOUS EVERY 4 HOURS PRN
Status: DISCONTINUED | OUTPATIENT
Start: 2018-12-11 | End: 2018-12-11

## 2018-12-11 RX ORDER — HYDRALAZINE HYDROCHLORIDE 20 MG/ML
10-20 INJECTION INTRAMUSCULAR; INTRAVENOUS EVERY 4 HOURS PRN
Status: DISCONTINUED | OUTPATIENT
Start: 2018-12-11 | End: 2018-12-13 | Stop reason: HOSPADM

## 2018-12-11 RX ADMIN — AMOXICILLIN AND CLAVULANATE POTASSIUM 1 TABLET: 500; 125 TABLET, FILM COATED ORAL at 20:17

## 2018-12-11 RX ADMIN — DOLUTEGRAVIR SODIUM 50 MG: 50 TABLET, FILM COATED ORAL at 21:33

## 2018-12-11 RX ADMIN — CARVEDILOL 12.5 MG: 12.5 TABLET, FILM COATED ORAL at 13:13

## 2018-12-11 RX ADMIN — CLOPIDOGREL 75 MG: 75 TABLET, FILM COATED ORAL at 20:17

## 2018-12-11 RX ADMIN — INSULIN GLARGINE 25 UNITS: 100 INJECTION, SOLUTION SUBCUTANEOUS at 21:30

## 2018-12-11 RX ADMIN — PANTOPRAZOLE SODIUM 40 MG: 40 TABLET, DELAYED RELEASE ORAL at 06:44

## 2018-12-11 RX ADMIN — HYDRALAZINE HYDROCHLORIDE 25 MG: 25 TABLET ORAL at 21:31

## 2018-12-11 RX ADMIN — HYDRALAZINE HYDROCHLORIDE 25 MG: 25 TABLET ORAL at 13:12

## 2018-12-11 RX ADMIN — GABAPENTIN 300 MG: 300 CAPSULE ORAL at 13:13

## 2018-12-11 RX ADMIN — Medication 400 MG: at 21:32

## 2018-12-11 RX ADMIN — ERYTHROPOIETIN 10000 UNITS: 10000 INJECTION, SOLUTION INTRAVENOUS; SUBCUTANEOUS at 08:40

## 2018-12-11 RX ADMIN — RITONAVIR 100 MG: 100 TABLET, FILM COATED ORAL at 21:34

## 2018-12-11 RX ADMIN — ABACAVIR 600 MG: 300 TABLET, FILM COATED ORAL at 20:17

## 2018-12-11 RX ADMIN — MIRTAZAPINE 15 MG: 15 TABLET, FILM COATED ORAL at 21:32

## 2018-12-11 RX ADMIN — CARVEDILOL 12.5 MG: 12.5 TABLET, FILM COATED ORAL at 17:50

## 2018-12-11 RX ADMIN — ACETAMINOPHEN 650 MG: 325 TABLET, FILM COATED ORAL at 00:44

## 2018-12-11 RX ADMIN — ATORVASTATIN CALCIUM 40 MG: 40 TABLET, FILM COATED ORAL at 21:31

## 2018-12-11 RX ADMIN — ISOSORBIDE MONONITRATE 120 MG: 60 TABLET, EXTENDED RELEASE ORAL at 20:17

## 2018-12-11 RX ADMIN — GABAPENTIN 300 MG: 300 CAPSULE ORAL at 20:17

## 2018-12-11 RX ADMIN — ACETAMINOPHEN 650 MG: 325 TABLET, FILM COATED ORAL at 08:19

## 2018-12-11 RX ADMIN — IRBESARTAN 300 MG: 75 TABLET ORAL at 21:32

## 2018-12-11 RX ADMIN — HYDRALAZINE HYDROCHLORIDE 10 MG: 20 INJECTION INTRAMUSCULAR; INTRAVENOUS at 00:04

## 2018-12-11 RX ADMIN — GUAIFENESIN 600 MG: 600 TABLET, EXTENDED RELEASE ORAL at 13:12

## 2018-12-11 RX ADMIN — DARUNAVIR 800 MG: 800 TABLET, FILM COATED ORAL at 21:34

## 2018-12-11 RX ADMIN — INSULIN ASPART 1 UNITS: 100 INJECTION, SOLUTION INTRAVENOUS; SUBCUTANEOUS at 17:54

## 2018-12-11 RX ADMIN — SODIUM CHLORIDE 250 ML: 9 INJECTION, SOLUTION INTRAVENOUS at 08:39

## 2018-12-11 RX ADMIN — ASPIRIN 81 MG: 81 TABLET, COATED ORAL at 13:13

## 2018-12-11 RX ADMIN — DAPSONE 100 MG: 100 TABLET ORAL at 21:32

## 2018-12-11 RX ADMIN — INSULIN GLARGINE 25 UNITS: 100 INJECTION, SOLUTION SUBCUTANEOUS at 13:13

## 2018-12-11 RX ADMIN — GUAIFENESIN 600 MG: 600 TABLET, EXTENDED RELEASE ORAL at 20:17

## 2018-12-11 RX ADMIN — NITROGLYCERIN 0.4 MG: 0.4 TABLET, ORALLY DISINTEGRATING SUBLINGUAL at 03:23

## 2018-12-11 ASSESSMENT — PAIN DESCRIPTION - DESCRIPTORS: DESCRIPTORS: SHARP;NUMBNESS

## 2018-12-11 ASSESSMENT — MIFFLIN-ST. JEOR: SCORE: 1690.13

## 2018-12-11 ASSESSMENT — ACTIVITIES OF DAILY LIVING (ADL)
ADLS_ACUITY_SCORE: 15
ADLS_ACUITY_SCORE: 17

## 2018-12-11 NOTE — PLAN OF CARE
A&O x 4, assist x 1 w/walker, carb count & dialysis diet, , dialysis scheduled for today, tele SR, O2 sats 90-93% RA, NC 1.5L 94-95%, pt developed CP during shift, EKG ordered, doctor paged and 1 nitroglycerin given, CP resolved, PRN hydralazine given once during shift for elevated BP readings, Tylenol given for 9/10 headache, possible discharge today to [facility], will continue with POC.

## 2018-12-11 NOTE — PROGRESS NOTES
[facility]  Hospitalist Progress Note  Name: Donald Raza    MRN: [id number]  YOB: 1950    Age: 68 year old  Date of admission: 12/5/2018  Primary care provider: Aida Thomas      Reason for Stay (Diagnosis): Acute sepsis         Assessment and Plan:      Summary of Stay:  This is a 68-year-old gentleman with a history of end-stage renal disease on hemodialysis Tuesdays, Thursdays, Saturdays; HIV with CD4 count greater than 400 per the patient; hyperlipidemia; coronary artery disease with prior PCI; prior history of TIA, who gets periodic blood transfusions every 3-4 months, came for evaluation for generalized weakness, cough, and fever. Also had mild headaches.  He did have some clinical evidence of acute sepsis which was suspected to be secondary to viral etiology but cannot rule out atypical bacteria or bacterial infections given his immune suppressed state.  On the early a.m. of 12/11/18, patient did develop some chest pain which was relieved with nitroglycerin.  Currently chest pain-free.    Problem List/Plan:  1. Acute sepsis as evident by fever, lactic acidosis, and notable source of infection: Suspect viral in etiology but cannot rule out atypical bacterial infections in a patient who is chronically immune suppressed.  Influenza a and B were negative.  Chest x-ray on presentation did demonstrate what appears to be bibasilar atelectasis but cannot rule out aspiration or infection.  Urine culture also growing out strep group C/G.   Continue p.o. Augmentin  2. History of HIV: Continue his antiretroviral drugs  3. End-stage kidney disease: Chronically on hemodialysis.  Schedules are Tuesdays, Thursdays, and Saturdays.  4. History of chronic anemia: Does require periodic transfusions.  Monitor for now.  5. Type 2 diabetes: Continue his Lantus 25 units subcu twice daily.  He is to take scheduled Humalog 15 units 3 times daily with meals along with a sliding  "scale but blood glucose seems to be reasonable.  Continue medium intensity sliding scale for now.  6. History of coronary artery disease status post PCI: Continue baby aspirin, atorvastatin, Plavix, carvedilol, hydralazine, and Imdur.  Given chest pain, previous angiogram in March 2018 reviewed and does show some stenosis of the bifurcated diagonal artery.  We will continue medications and plan on a stress test tomorrow for further risk stratification.  7. Generalized weakness: No changes mind and wanted to go home with wife instead.      DVT Prophylaxis: Pneumatic Compression Devices  Code Status: Full Code  Discharge Dispo: Discussed with patient.  Did encourage him to ambulate with PT and nursing staff and if he passes PT, could conceivably go home tomorrow a stress test is negative.  Estimated Disch Date / # of Days until Disch: Tomorrow if stress test is negative and passes PT.        Interval History (Subjective):      Notable chest pain this morning at around 3 AM that was relieved with one nitroglycerin.  Currently chest pain-free and breathing is \"okay\".         Physical Exam:      Vital signs:  Temp: 97.7  F (36.5  C) Temp src: Oral BP: 170/84 Pulse: 77 Heart Rate: 65 Resp: 16 SpO2: 97 % O2 Device: Nasal cannula Oxygen Delivery: 2 LPM Height: 180.3 cm (5' 11\") Weight: 90.8 kg (200 lb 2.8 oz)  Estimated body mass index is 27.92 kg/m  as calculated from the following:    Height as of this encounter: 1.803 m (5' 11\").    Weight as of this encounter: 90.8 kg (200 lb 2.8 oz).    I/O last 3 completed shifts:  In: 480 [P.O.:480]  Out: 200 [Urine:200]  Vitals:    12/06/18 0136 12/06/18 1753 12/08/18 0744 12/10/18 0638   Weight: 91.8 kg (202 lb 4.8 oz) 91.8 kg (202 lb 4.8 oz) 91.8 kg (202 lb 6.1 oz) 90.8 kg (200 lb 3.2 oz)    12/11/18 0713   Weight: 90.8 kg (200 lb 2.8 oz)       Constitutional: Awake, alert, cooperative, but ill-appearing.  Otherwise, no apparent distress   Respiratory: Nl work of breathing.  " Diminished breath sounds at the bases   Cardiovascular: Regular rate and rhythm, normal S1 and S2, and no murmur noted       Skin: No rashes, no cyanosis, dry to touch   Neuro: CN 2-12 intact, no localizing weakness   Extremities: No edema, normal range of motion   HEENT Normocephalic, atraumatic, normal nasal turbinates; oropharynx clear   Neck Supple; nl inspection; trachea midline; no thryomegaly   Psychiatric: A+O x3. Normal affect          Medications:      All current medications were reviewed with changes reflected in problem list.         Data:      All new lab and imaging data was reviewed.   Labs:  Recent Labs   Lab 12/06/18  0920 12/05/18  2213 12/05/18  2140   CULT 10,000 to 50,000 colonies/mL  Streptococcus dysgalactiae serogroup C/G  *  10,000 to 50,000 colonies/mL  mixed urogenital subhash   No growth after 5 days No growth after 5 days     Recent Labs   Lab 12/11/18  0640 12/10/18  0547 12/09/18  0647 12/08/18  0702 12/07/18  0622   WBC  --  4.4  --  4.2 5.2   HGB 7.9* 8.2* 8.3* 7.4* 7.7*   HCT  --  27.8*  --  24.4* 25.7*   MCV  --  94  --  93 94   PLT  --  111*  --  92* 85*     Recent Labs   Lab 12/11/18  0640 12/10/18  0547 12/08/18  0702  12/05/18  2140    134 136   < > 137   POTASSIUM 4.4 4.3 3.9   < > 4.4   CHLORIDE 95 98 99   < > 100   CO2 28 30 27   < > 26   ANIONGAP 10 6 10   < > 11   * 100* 83   < > 195*   BUN 55* 43* 56*   < > 61*   CR 9.63* 8.06* 8.38*   < > 8.49*   GFRESTIMATED 5* 7* 6*   < > 6*   GFRESTBLACK 7* 8* 8*   < > 8*   PRABHA 9.2 9.3 9.3   < > 9.4   PHOS 6.1*  --  6.7*  --   --    PROTTOTAL  --   --   --   --  8.1   ALBUMIN 2.8*  --  2.8*  --  3.8   BILITOTAL  --   --   --   --  0.6   ALKPHOS  --   --   --   --  139   AST  --   --   --   --  15   ALT  --   --   --   --  13    < > = values in this interval not displayed.     Recent Labs   Lab 12/06/18  0920   COLOR Yellow   APPEARANCE Clear   URINEGLC >499*   URINEBILI Negative   URINEKETONE Negative   SG 1.008   UBLD  Negative   URINEPH 9.0*   PROTEIN >499*   NITRITE Negative   LEUKEST Negative   RBCU 1   WBCU 3      Imaging:   No results found for this or any previous visit (from the past 24 hour(s)).    Jose De Jesus Orozco -[phone]

## 2018-12-11 NOTE — PLAN OF CARE
VSS, on 1L O2 per patient request. R hip pain controlled with tylenol this AM. Denies chest pain. Trops negative. Dialysis done today. Plan for lexiscan tomorrow. Tele - SR. Will continue to monitor.

## 2018-12-11 NOTE — PROGRESS NOTES
Potassium   Date Value Ref Range Status   12/11/2018 4.4 3.4 - 5.3 mmol/L Final     Lab Results   Component Value Date    HGB 7.9 12/11/2018     Weight: 90.8 kg (200 lb 2.8 oz)  POST WT 86.8 kg   DIALYSIS PROCEDURE NOTE  Hepatitis status of previous patient on machine log was checked and verified ok to use with this patients hepatitis status.  Patient dialyzed for 3.75 hrs. on a 3 K bath with a net fluid removal of  4L.  A BFR of 400 ml/min was obtained via a Left AVF using  15 gauge needles.    The patient was seen by Dr. Marcial  during the treatment.  Total heparin received during the treatment: 0 units. Sites held x 20 min then  pressure drsgs applied.    Meds  Given:Epogen. Complications: None.  Procedure and ESRD teaching done.   See flowsheet in EPIC for further details and post assessment.  Machine water alarm in place and functioning. Transducer pods intact and checked every 15min.  Pt returned via Wheelchair  Vascular Access: Aseptic prep done for both on/off.  Report received from: Vicki Cartagena RN  Report given to: Cristina Nicole RN   HEPATITIS B SURFACE ANTIGEN: Negative.  DATE: 01/20/2018    HEPATITIS B SURFACE ANTIBODY: Immune. DATE: 01/20/2018  Chlorine/Chloramine water system checked every 4 hours.  Outpatient Dialysis at [facility]

## 2018-12-11 NOTE — PROGRESS NOTES
Renal Medicine Progress Note            Assessment/Plan:   68 y.o gentleman with ESRD, admitted weakness, cough and fever.     # ESRD  # HIV  # CAD  # Anemia  # HTN  # CKD-MBD  # Infection? On Augmentin.           Interval History:      Afebrile. VSS. Pt says he feels better. Denies worsening shortness of breath. He is getting dialysis treatment.     Plan.  # 3.45hrs, UF goal 4 liters UF, 10K units of Epogen, Qb 400, 3K, no heparin, LAVF  # Next treatment on Wednesday         Medications and Allergies:       - MEDICATION INSTRUCTIONS for Dialysis Patients -   Does not apply See Admin Instructions     sodium chloride 0.9%  300 mL Hemodialysis Machine Once     abacavir  600 mg Oral QPM     amoxicillin-clavulanate  1 tablet Oral QPM     aspirin  81 mg Oral Daily     atorvastatin  40 mg Oral At Bedtime     carvedilol  12.5 mg Oral BID w/meals     clopidogrel (PLAVIX) tablet 75 mg  75 mg Oral QPM     dapsone  100 mg Oral At Bedtime     darunavir  800 mg Oral At Bedtime     dolutegravir  50 mg Oral At Bedtime     gabapentin  300 mg Oral BID     guaiFENesin  600 mg Oral BID     hydrALAZINE  25 mg Oral TID     insulin aspart  1-7 Units Subcutaneous TID AC     insulin aspart  1-5 Units Subcutaneous At Bedtime     insulin glargine  25 Units Subcutaneous BID     irbesartan  300 mg Oral At Bedtime     isosorbide mononitrate  120 mg Oral QPM     magnesium oxide (MAG-OX) tablet 400 mg  400 mg Oral At Bedtime     mirtazapine  15 mg Oral At Bedtime     - MEDICATION INSTRUCTIONS -   Does not apply Once     pantoprazole  40 mg Oral Daily     ritonavir  100 mg Oral At Bedtime     sucroferric oxyhydroxide  500 mg Oral TID w/meals        Allergies   Allergen Reactions     Calcium Acetate Other (See Comments)     Other reaction(s): Other, see comments  Pain in chest and back  Pain in chest area (sensitive skin)      Diagnostic X-Ray Materials Other (See Comments)     PN: renal failure     Lisinopril      Sulfa Drugs             Physical  "Exam:   Vitals were reviewed  Heart Rate: 68, Blood pressure 150/76, pulse 77, temperature 97.7  F (36.5  C), temperature source Oral, resp. rate 16, height 1.803 m (5' 11\"), weight 90.8 kg (200 lb 2.8 oz), SpO2 95 %.    Wt Readings from Last 3 Encounters:   12/11/18 90.8 kg (200 lb 2.8 oz)   09/07/18 86.9 kg (191 lb 8 oz)   07/14/18 86.6 kg (190 lb 14.7 oz)       Intake/Output Summary (Last 24 hours) at 12/11/2018 0953  Last data filed at 12/11/2018 0500  Gross per 24 hour   Intake 480 ml   Output 100 ml   Net 380 ml       GENERAL APPEARANCE: pleasant, NAD, alert  HEENT:  Eyes/ears/nose/neck grossly normal  RESP: lungs cta b c good efforts, no crackles, rhonchi or wheezes  CV: RRR, nl S1/S2, + murmur  ABDOMEN: o/s/nt/nd, bs present  EXTREMITIES/SKIN: no rashes/lesions on observed skin; no edema  Neuro: a/o x 3         Data:     CBC RESULTS:     Recent Labs   Lab 12/11/18  0640 12/10/18  0547 12/09/18  0647 12/08/18  0702 12/07/18  0622 12/06/18  0933 12/05/18  2140   WBC  --  4.4  --  4.2 5.2 6.6 6.4   RBC  --  2.95*  --  2.63* 2.74* 2.84* 3.03*   HGB 7.9* 8.2* 8.3* 7.4* 7.7* 8.1* 8.6*   HCT  --  27.8*  --  24.4* 25.7* 26.2* 28.2*   PLT  --  111*  --  92* 85* 96* 128*       Basic Metabolic Panel:  Recent Labs   Lab 12/11/18  0640 12/10/18  0547 12/08/18  0702 12/07/18  0622 12/06/18  0933 12/05/18  2140    134 136 140 138 137   POTASSIUM 4.4 4.3 3.9 4.2 4.4 4.4   CHLORIDE 95 98 99 102 103 100   CO2 28 30 27 30 24 26   BUN 55* 43* 56* 39* 69* 61*   CR 9.63* 8.06* 8.38* 6.59* 9.63* 8.49*   * 100* 83 114* 152* 195*   PRABHA 9.2 9.3 9.3 9.0 8.9 9.4       INR  Recent Labs   Lab 12/05/18  2214   INR 1.03      Attestation:   I have reviewed today's relevant vital signs, notes, medications, labs and imaging.    Doyle Marcial MD  [facility] Nephrology  Office phone :[phone]  Pager: [phone]  "

## 2018-12-12 ENCOUNTER — APPOINTMENT (OUTPATIENT)
Dept: PHYSICAL THERAPY | Facility: CLINIC | Age: 68
DRG: 974 | End: 2018-12-12
Payer: MEDICARE

## 2018-12-12 ENCOUNTER — PATIENT OUTREACH (OUTPATIENT)
Dept: CARE COORDINATION | Facility: CLINIC | Age: 68
End: 2018-12-12

## 2018-12-12 ENCOUNTER — APPOINTMENT (OUTPATIENT)
Dept: NUCLEAR MEDICINE | Facility: CLINIC | Age: 68
DRG: 974 | End: 2018-12-12
Attending: INTERNAL MEDICINE
Payer: MEDICARE

## 2018-12-12 LAB
BACTERIA SPEC CULT: NO GROWTH
BACTERIA SPEC CULT: NO GROWTH
GLUCOSE BLDC GLUCOMTR-MCNC: 155 MG/DL (ref 70–99)
GLUCOSE BLDC GLUCOMTR-MCNC: 157 MG/DL (ref 70–99)
GLUCOSE BLDC GLUCOMTR-MCNC: 205 MG/DL (ref 70–99)
Lab: NORMAL
Lab: NORMAL
SPECIMEN SOURCE: NORMAL
SPECIMEN SOURCE: NORMAL

## 2018-12-12 PROCEDURE — 78452 HT MUSCLE IMAGE SPECT MULT: CPT | Mod: 26 | Performed by: INTERNAL MEDICINE

## 2018-12-12 PROCEDURE — 93017 CV STRESS TEST TRACING ONLY: CPT

## 2018-12-12 PROCEDURE — 25000131 ZZH RX MED GY IP 250 OP 636 PS 637: Mod: GY | Performed by: INTERNAL MEDICINE

## 2018-12-12 PROCEDURE — 25000128 H RX IP 250 OP 636

## 2018-12-12 PROCEDURE — 97110 THERAPEUTIC EXERCISES: CPT | Mod: GP | Performed by: PHYSICAL THERAPIST

## 2018-12-12 PROCEDURE — 00000146 ZZHCL STATISTIC GLUCOSE BY METER IP

## 2018-12-12 PROCEDURE — 99233 SBSQ HOSP IP/OBS HIGH 50: CPT | Performed by: INTERNAL MEDICINE

## 2018-12-12 PROCEDURE — A9502 TC99M TETROFOSMIN: HCPCS | Performed by: INTERNAL MEDICINE

## 2018-12-12 PROCEDURE — 25000132 ZZH RX MED GY IP 250 OP 250 PS 637: Mod: GY | Performed by: INTERNAL MEDICINE

## 2018-12-12 PROCEDURE — 93018 CV STRESS TEST I&R ONLY: CPT | Performed by: INTERNAL MEDICINE

## 2018-12-12 PROCEDURE — 34300033 ZZH RX 343: Performed by: INTERNAL MEDICINE

## 2018-12-12 PROCEDURE — 12000007 ZZH R&B INTERMEDIATE

## 2018-12-12 PROCEDURE — A9270 NON-COVERED ITEM OR SERVICE: HCPCS | Mod: GY | Performed by: INTERNAL MEDICINE

## 2018-12-12 PROCEDURE — 97116 GAIT TRAINING THERAPY: CPT | Mod: GP | Performed by: PHYSICAL THERAPIST

## 2018-12-12 PROCEDURE — 78452 HT MUSCLE IMAGE SPECT MULT: CPT

## 2018-12-12 PROCEDURE — 93016 CV STRESS TEST SUPVJ ONLY: CPT | Performed by: INTERNAL MEDICINE

## 2018-12-12 PROCEDURE — 40000193 ZZH STATISTIC PT WARD VISIT: Performed by: PHYSICAL THERAPIST

## 2018-12-12 RX ORDER — AMINOPHYLLINE 25 MG/ML
INJECTION, SOLUTION INTRAVENOUS
Status: DISPENSED
Start: 2018-12-12 | End: 2018-12-12

## 2018-12-12 RX ORDER — REGADENOSON 0.08 MG/ML
INJECTION, SOLUTION INTRAVENOUS
Status: COMPLETED
Start: 2018-12-12 | End: 2018-12-12

## 2018-12-12 RX ADMIN — IRBESARTAN 300 MG: 75 TABLET ORAL at 22:22

## 2018-12-12 RX ADMIN — REGADENOSON 0.4 MG: 0.08 INJECTION, SOLUTION INTRAVENOUS at 08:47

## 2018-12-12 RX ADMIN — HYDRALAZINE HYDROCHLORIDE 25 MG: 25 TABLET ORAL at 22:23

## 2018-12-12 RX ADMIN — CLOPIDOGREL 75 MG: 75 TABLET, FILM COATED ORAL at 20:02

## 2018-12-12 RX ADMIN — GABAPENTIN 300 MG: 300 CAPSULE ORAL at 22:22

## 2018-12-12 RX ADMIN — TETROFOSMIN 10.7 MCI.: 1.38 INJECTION, POWDER, LYOPHILIZED, FOR SOLUTION INTRAVENOUS at 07:18

## 2018-12-12 RX ADMIN — INSULIN GLARGINE 25 UNITS: 100 INJECTION, SOLUTION SUBCUTANEOUS at 11:14

## 2018-12-12 RX ADMIN — PANTOPRAZOLE SODIUM 40 MG: 40 TABLET, DELAYED RELEASE ORAL at 06:36

## 2018-12-12 RX ADMIN — CARVEDILOL 12.5 MG: 12.5 TABLET, FILM COATED ORAL at 17:43

## 2018-12-12 RX ADMIN — DOLUTEGRAVIR SODIUM 50 MG: 50 TABLET, FILM COATED ORAL at 22:22

## 2018-12-12 RX ADMIN — INSULIN GLARGINE 25 UNITS: 100 INJECTION, SOLUTION SUBCUTANEOUS at 22:20

## 2018-12-12 RX ADMIN — DAPSONE 100 MG: 100 TABLET ORAL at 22:22

## 2018-12-12 RX ADMIN — ASPIRIN 81 MG: 81 TABLET, COATED ORAL at 11:12

## 2018-12-12 RX ADMIN — TETROFOSMIN 32.6 MCI.: 1.38 INJECTION, POWDER, LYOPHILIZED, FOR SOLUTION INTRAVENOUS at 09:41

## 2018-12-12 RX ADMIN — GABAPENTIN 300 MG: 300 CAPSULE ORAL at 11:13

## 2018-12-12 RX ADMIN — MIRTAZAPINE 15 MG: 15 TABLET, FILM COATED ORAL at 22:23

## 2018-12-12 RX ADMIN — AMOXICILLIN AND CLAVULANATE POTASSIUM 1 TABLET: 500; 125 TABLET, FILM COATED ORAL at 20:02

## 2018-12-12 RX ADMIN — GUAIFENESIN 600 MG: 600 TABLET, EXTENDED RELEASE ORAL at 22:22

## 2018-12-12 RX ADMIN — INSULIN ASPART 1 UNITS: 100 INJECTION, SOLUTION INTRAVENOUS; SUBCUTANEOUS at 17:44

## 2018-12-12 RX ADMIN — HYDRALAZINE HYDROCHLORIDE 25 MG: 25 TABLET ORAL at 15:45

## 2018-12-12 RX ADMIN — ABACAVIR 600 MG: 300 TABLET, FILM COATED ORAL at 20:02

## 2018-12-12 RX ADMIN — DARUNAVIR 800 MG: 800 TABLET, FILM COATED ORAL at 22:21

## 2018-12-12 RX ADMIN — ISOSORBIDE MONONITRATE 120 MG: 60 TABLET, EXTENDED RELEASE ORAL at 20:01

## 2018-12-12 RX ADMIN — ATORVASTATIN CALCIUM 40 MG: 40 TABLET, FILM COATED ORAL at 22:22

## 2018-12-12 RX ADMIN — GUAIFENESIN 600 MG: 600 TABLET, EXTENDED RELEASE ORAL at 11:13

## 2018-12-12 RX ADMIN — RITONAVIR 100 MG: 100 TABLET, FILM COATED ORAL at 22:20

## 2018-12-12 RX ADMIN — HYDRALAZINE HYDROCHLORIDE 25 MG: 25 TABLET ORAL at 11:12

## 2018-12-12 RX ADMIN — CARVEDILOL 12.5 MG: 12.5 TABLET, FILM COATED ORAL at 11:13

## 2018-12-12 RX ADMIN — Medication 400 MG: at 22:22

## 2018-12-12 RX ADMIN — INSULIN ASPART 1 UNITS: 100 INJECTION, SOLUTION INTRAVENOUS; SUBCUTANEOUS at 11:15

## 2018-12-12 ASSESSMENT — ACTIVITIES OF DAILY LIVING (ADL)
ADLS_ACUITY_SCORE: 17

## 2018-12-12 NOTE — PROGRESS NOTES
D:  Per record review, pt's discharge recommendation is TCU.    I:  Sw spoke with pt who said that he now wants to discharge home.  He said that his wife and dtr will be able to provide him the cares that he will need.  He said he does not have any concerns with returning home.  He walks around the house for exercise and does other exercises.  He said that his dtr Pura takes him to dialysis and his brother picks him.  If his family cannot transport him, he uses a taxi.    A/P:  Sw will continue with discharge planning and will be available as needed.

## 2018-12-12 NOTE — CONSULTS
"CLINICAL NUTRITION SERVICES  -  BRIEF ASSESSMENT NOTE      Malnutrition: Patient does not meet malnutrition criteria at this time        REASON FOR ASSESSMENT  Donald Raza is a 68 year old male seen by Registered Dietitian for LOS.      NUTRITION HISTORY  - Information obtained from patient and chart.  - Patient with a h/o ESRD on HD, HIV, CAD, previous TIA, DMII (on insulin PTA).    - Meals TID is baseline.  Takes 1 Nepro daily with meals.    - No recent changes to oral intakes.  Eating well per his report.   - NKFA.      CURRENT NUTRITION ORDERS  Diet Order:     Dialysis  Nepro with meals    Current Intake/Tolerance:  Previously on combination mod CHO/dialysis diet.  100% intakes since admission.  Consistently ordering meals TID per review of system.        PHYSICAL FINDINGS  Observed  See below  Obtained from Chart/Interdisciplinary Team  HD runs 12/06, 12/08, and yesterday.    ANTHROPOMETRICS  Height: 5' 11\"  Weight: 90.8 kg (200#)  Body mass index is 27.92 kg/m .  Weight Status:  Overweight BMI 25-29.9  IBW: 78.2 kg (172#)  % IBW: 116%  Weight History:  Wt Readings from Last 10 Encounters:   12/11/18 90.8 kg (200 lb 2.8 oz)   09/07/18 86.9 kg (191 lb 8 oz)   07/14/18 86.6 kg (190 lb 14.7 oz)   04/11/18 85.7 kg (189 lb)   03/16/18 79.8 kg (175 lb 14.8 oz)   02/01/18 87.8 kg (193 lb 9.6 oz)   02/01/18 90.3 kg (199 lb 1.2 oz)   01/29/18 91.3 kg (201 lb 4.8 oz)   01/19/18 85.3 kg (188 lb)   01/17/18 88.9 kg (196 lb)   - Likely fluid up.  Wt shifts are the result of fluids.    LABS  Labs reviewed including phos and K trending:  Lab Results   Component Value Date    A1C 4.7 12/06/2018    A1C Canceled, Test credited 09/03/2018    A1C Canceled, Test credited 09/02/2018    A1C Canceled, Test credited 03/08/2018    A1C Canceled, Test credited 02/01/2018       MEDICATIONS  Medications reviewed      MALNUTRITION:  % Weight Loss:  None noted  % Intake:  No decreased intake noted  Subcutaneous Fat Loss:  None " observed  Muscle Loss:  None observed  Fluid Retention:  None noted    Malnutrition Diagnosis: Patient does not meet two of the above criteria necessary for diagnosing malnutrition    NUTRITION DIAGNOSIS:  No nutrition diagnosis at this time.      NUTRITION INTERVENTIONS  Recommendations / Nutrition Prescription  Continue dialysis diet.  Do not recommend adding back CHO restriction at this time based on most recent A1C.    Continue Nepro with meals TID per patient request.      Implementation  Nutrition education: Per Provider order if indicated.    Collaboration and Referral of Nutrition care: An RD discussed POC with team during rounds.      MONITORING AND EVALUATION:  Progress towards goals will be monitored and evaluated per protocol and Practice Guidelines        Kylee Modi RD, LD  Clinical Dietitian  3rd floor/ICU: [phone]  All other floors: [phone]  Weekend/holiday: [phone]

## 2018-12-12 NOTE — PROGRESS NOTES
[facility]  Hospitalist Progress Note  Name: Donald Raza    MRN: [id number]  YOB: 1950    Age: 68 year old  Date of admission: 12/5/2018  Primary care provider: Aida Thomas      Reason for Stay (Diagnosis): Acute sepsis         Assessment and Plan:      Summary of Stay:  This is a 68-year-old gentleman with a history of end-stage renal disease on hemodialysis Tuesdays, Thursdays, Saturdays; HIV with CD4 count greater than 400 per the patient; hyperlipidemia; coronary artery disease with prior PCI; prior history of TIA, who gets periodic blood transfusions every 3-4 months, came for evaluation for generalized weakness, cough, and fever. Also had mild headaches.  He did have some clinical evidence of acute sepsis which was suspected to be secondary to viral etiology but cannot rule out atypical bacteria or bacterial infections given his immune suppressed state.  Since admission, patient's progress have been waxing and waning but seems to be gradually improving with improvement of strength.  There was some initial plans for discharge to TCU but patient seemingly stronger and is quite reluctant to go to TCU.  He has been able to ambulate with standby assist and walker.  Only source of infection is a suspected urinary tract infection in which the urine culture did grow out strep group C/G.  Should be adequately covered by antibiotics.  Patient was initially covered with vancomycin and Zosyn in the ED but then transitioned to Zosyn alone on 12/10/18 and now currently on Augmentin.    Problem List/Plan:  1. Acute sepsis as evident by fever, lactic acidosis, and notable source of infection: Suspect viral in etiology but cannot rule out atypical bacterial infections in a patient who is chronically immune suppressed.  Influenza a and B were negative.  Chest x-ray on presentation did demonstrate what appears to be bibasilar atelectasis but cannot rule out aspiration or  infection.  Urine culture also growing out strep group C/G.  If able to discharge tomorrow, would only recommend 5 additional days of p.o. Augmentin.  2. History of HIV: Continue his antiretroviral drugs  3. End-stage kidney disease: Chronically on hemodialysis.  Schedules are Tuesdays, Thursdays, and Saturdays.  Next dialysis session planned for tomorrow  4. History of chronic anemia: Does require periodic transfusions on a chronic basis.  Monitor for now.  We will repeat CBC tomorrow and if hemoglobin is less than 7, would recommend transfusion during dialysis.  5. Type 2 diabetes: Continue his Lantus 25 units subcu twice daily.  He previously took scheduled Humalog 15 units 3 times daily with meals along with a sliding scale but blood glucose seems to be reasonable.  Continue only medium intensity sliding scale for now.  6. History of coronary artery disease status post PCI: On the early a.m. of 12/11/18, patient did develop some chest pain that was relieved with nitroglycerin.  Given his known history of coronary artery disease and previous angiogram in March 2018 that reviewed some stenosis of the bifurcated diagonal artery, a Lindy stress scan on 12/12/18 was performed which demonstrated no significant areas of ischemia and otherwise unchanged from 2017.  Please see results as outlined below.  Will continue baby aspirin, atorvastatin, Plavix, carvedilol, hydralazine, and Imdur.  No ace inhibitors or ARB in the setting of end-stage kidney disease  7. Generalized weakness: Patient reluctant for TCU placement and plan on home with home health care.    GATED MYOCARDIAL PERFUSION SCINTIGRAPHY WITH INTRAVENOUS PHARMACOLOGIC  VASODILATATION TAVARESAN -ONE DAY STUDY      12/12/2018 9:42 AM  GREGORIO BRUSH  68 years  Male  1950.     Indication/Clinical History: Chest pain     Impression  1.  Myocardial perfusion imaging using single isotope technique  demonstrated normal myocardial perfusion.   2. Gated images  "demonstrated normal wall motion.  The left ventricular  systolic function is normal with a calculated ejection fraction of  59%.  3. Compared to the prior study from 2017, no significant changes are  noted .      DVT Prophylaxis: Pneumatic Compression Devices  Code Status: Full Code  Discharge Dispo: Home with home health care  Estimated Disch Date / # of Days until Disch: Tomorrow after dialysis if patient is stable enough to go home.    Time spent: Greater than 35 minutes.        Interval History (Subjective):      No new complaints         Physical Exam:      Vital signs:  Temp: 97.4  F (36.3  C) Temp src: Oral BP: 153/75 Pulse: 69 Heart Rate: 77 Resp: 16 SpO2: 92 % O2 Device: None (Room air) Oxygen Delivery: 1 LPM Height: 180.3 cm (5' 11\") Weight: 90.8 kg (200 lb 2.8 oz)  Estimated body mass index is 27.92 kg/m  as calculated from the following:    Height as of this encounter: 1.803 m (5' 11\").    Weight as of this encounter: 90.8 kg (200 lb 2.8 oz).    I/O last 3 completed shifts:  In: 960 [P.O.:960]  Out: -   Vitals:    12/06/18 0136 12/06/18 1753 12/08/18 0744 12/10/18 0638   Weight: 91.8 kg (202 lb 4.8 oz) 91.8 kg (202 lb 4.8 oz) 91.8 kg (202 lb 6.1 oz) 90.8 kg (200 lb 3.2 oz)    12/11/18 0713   Weight: 90.8 kg (200 lb 2.8 oz)       Constitutional: Awake, alert, cooperative, but ill-appearing.  Otherwise, no apparent distress   Respiratory: Nl work of breathing.  Diminished breath sounds at the bases   Cardiovascular: Regular rate and rhythm, normal S1 and S2, and no murmur noted       Skin: No rashes, no cyanosis, dry to touch   Neuro: CN 2-12 intact, no localizing weakness   Extremities: No edema, normal range of motion   HEENT Normocephalic, atraumatic, normal nasal turbinates; oropharynx clear   Neck Supple; nl inspection; trachea midline; no thryomegaly   Psychiatric: A+O x3. Normal affect          Medications:      All current medications were reviewed with changes reflected in problem list.         " Data:      All new lab and imaging data was reviewed.   Labs:  Recent Labs   Lab 12/06/18  0920 12/05/18  2213 12/05/18  2140   CULT 10,000 to 50,000 colonies/mL  Streptococcus dysgalactiae serogroup C/G  *  10,000 to 50,000 colonies/mL  mixed urogenital subhash   No growth No growth     Recent Labs   Lab 12/11/18  0640 12/10/18  0547 12/09/18  0647 12/08/18  0702 12/07/18  0622   WBC  --  4.4  --  4.2 5.2   HGB 7.9* 8.2* 8.3* 7.4* 7.7*   HCT  --  27.8*  --  24.4* 25.7*   MCV  --  94  --  93 94   PLT  --  111*  --  92* 85*     Recent Labs   Lab 12/11/18  0640 12/10/18  0547 12/08/18  0702  12/05/18  2140    134 136   < > 137   POTASSIUM 4.4 4.3 3.9   < > 4.4   CHLORIDE 95 98 99   < > 100   CO2 28 30 27   < > 26   ANIONGAP 10 6 10   < > 11   * 100* 83   < > 195*   BUN 55* 43* 56*   < > 61*   CR 9.63* 8.06* 8.38*   < > 8.49*   GFRESTIMATED 5* 7* 6*   < > 6*   GFRESTBLACK 7* 8* 8*   < > 8*   PRABHA 9.2 9.3 9.3   < > 9.4   PHOS 6.1*  --  6.7*  --   --    PROTTOTAL  --   --   --   --  8.1   ALBUMIN 2.8*  --  2.8*  --  3.8   BILITOTAL  --   --   --   --  0.6   ALKPHOS  --   --   --   --  139   AST  --   --   --   --  15   ALT  --   --   --   --  13    < > = values in this interval not displayed.     Recent Labs   Lab 12/06/18 0920   COLOR Yellow   APPEARANCE Clear   URINEGLC >499*   URINEBILI Negative   URINEKETONE Negative   SG 1.008   UBLD Negative   URINEPH 9.0*   PROTEIN >499*   NITRITE Negative   LEUKEST Negative   RBCU 1   WBCU 3      Imaging:   No results found for this or any previous visit (from the past 24 hour(s)).    Jose De Jesus Orozco [phone]

## 2018-12-12 NOTE — PLAN OF CARE
Discharge Planner PT   Patient plan for discharge:   Home with assist  Current status: Pt transfers sit <> stand to FWW with S.  Pt amb ~160' with FWW and S with slow steady gait.  Cueing for posture and to relax shoulders.  Pt also amb 100' with his SEC with unsteady gait and close SBA.  Pt advised to use FWW at all times at home.  Pt amb up/down 12 steps with single rail and SBA using a step to pattern.  O2 sats 93% after gait on RA.    Barriers to return to prior living situation: none  Recommendations for discharge: Home with Home PT  Rationale for recommendations: continued skilled PT to increase strength and balance, progress gait and increase activity tolerance as pt reports that he is often too fatigued after dialysis to move.       Entered by: Fany Meredith 12/12/2018 12:08 PM

## 2018-12-12 NOTE — PLAN OF CARE
VS stable. A & O x 4, bed alarms placed for safety.   Tele:sinus rhythm.   Diet:dialysis diet with mod cho, no caffeine this hali for lexiscan tomorrow morning   Ambulates:with assist  of 1, walker  and gait belt. Walked in hallway with support staff, tolerated well.   IV meds:saline locked.   B, 193  Pain:denies   Abnormal labs: troponin continues to trend down, elevated labs do to CKD.   Abnormal assessments:diminished lung sounds, some baseline numbness/tingling in bilateral legs.  Small scratch marks/scabs on left lower arm. No shortness of breath reported with activity tonight.     Intake & output: patient on hemodialysis. 3 soft brown stools this hali. Tolerated food well.   Drains/devices : fistulas in left arm.   Discharge plan: likely  to discharge tomorrow. Unsure at this time if it  will be to TCU or home. Patient's  daughter  would like to be called in am by social work for insurance purposes. Will continue to monitor and review plan of care.

## 2018-12-12 NOTE — PROGRESS NOTES
Pre-procedure:  Are you having any pain or shortness of breath (prior to starting)? No   Initial vital signs: /81, HR 73, RR 16  Allergies reviewed: yes   Rhythm: Sinus  Medications taken within 48 hours of procedure: see epic   Last Caffeine: none today  Lung sounds: CTA, no wheezing, crackles or rtx  Health History (COPD, Asthma, etc): none           Procedure: Lexiscan  Reaction/symptoms after receiving Lindy injection: Shortness of breath, headache, left sided pain  Intensity of Pain: mild  Rhythm: sinus  1. Vital Signs:/65, HR 81, RR 16  2. Vital Signs:/71, HR 81, RR 15     Reversal agent: N/A    Post:   Resolution of symptoms?: YES  Vital signs: /76, HR 82, RR 14  Rhythm: sinus  Walk: NO  Comment: Patient denies any chest pain or shortness of breath. Transport back to    Radiology

## 2018-12-12 NOTE — PLAN OF CARE
A/Ox4, VSS, SBA for all transfers, Tele SR, dialysis diet with mod cho diet no caffeine - NPO at 0400 for lexiscan today, refused 0200 BS - requested to sleep, denies pain, LS diminished, fistulas to L arm, possible discharge tomorrow, continue with plan of care.

## 2018-12-13 VITALS
OXYGEN SATURATION: 97 % | BODY MASS INDEX: 27.93 KG/M2 | TEMPERATURE: 98.7 F | RESPIRATION RATE: 20 BRPM | WEIGHT: 199.52 LBS | SYSTOLIC BLOOD PRESSURE: 158 MMHG | DIASTOLIC BLOOD PRESSURE: 79 MMHG | HEART RATE: 74 BPM | HEIGHT: 71 IN

## 2018-12-13 LAB
ANION GAP SERPL CALCULATED.3IONS-SCNC: 10 MMOL/L (ref 3–14)
BUN SERPL-MCNC: 62 MG/DL (ref 7–30)
CALCIUM SERPL-MCNC: 9.1 MG/DL (ref 8.5–10.1)
CHLORIDE SERPL-SCNC: 95 MMOL/L (ref 94–109)
CO2 SERPL-SCNC: 28 MMOL/L (ref 20–32)
CREAT SERPL-MCNC: 8.89 MG/DL (ref 0.66–1.25)
ERYTHROCYTE [DISTWIDTH] IN BLOOD BY AUTOMATED COUNT: 15.9 % (ref 10–15)
GFR SERPL CREATININE-BSD FRML MDRD: 6 ML/MIN/1.7M2
GLUCOSE BLDC GLUCOMTR-MCNC: 142 MG/DL (ref 70–99)
GLUCOSE BLDC GLUCOMTR-MCNC: 186 MG/DL (ref 70–99)
GLUCOSE BLDC GLUCOMTR-MCNC: 235 MG/DL (ref 70–99)
GLUCOSE SERPL-MCNC: 235 MG/DL (ref 70–99)
HCT VFR BLD AUTO: 25.5 % (ref 40–53)
HGB BLD-MCNC: 7.9 G/DL (ref 13.3–17.7)
MCH RBC QN AUTO: 29 PG (ref 26.5–33)
MCHC RBC AUTO-ENTMCNC: 31 G/DL (ref 31.5–36.5)
MCV RBC AUTO: 94 FL (ref 78–100)
PLATELET # BLD AUTO: 135 10E9/L (ref 150–450)
POTASSIUM SERPL-SCNC: 4.3 MMOL/L (ref 3.4–5.3)
RBC # BLD AUTO: 2.72 10E12/L (ref 4.4–5.9)
SODIUM SERPL-SCNC: 133 MMOL/L (ref 133–144)
WBC # BLD AUTO: 5.6 10E9/L (ref 4–11)

## 2018-12-13 PROCEDURE — A9270 NON-COVERED ITEM OR SERVICE: HCPCS | Mod: GY | Performed by: INTERNAL MEDICINE

## 2018-12-13 PROCEDURE — 90937 HEMODIALYSIS REPEATED EVAL: CPT

## 2018-12-13 PROCEDURE — 63400005 ZZH RX 634: Performed by: INTERNAL MEDICINE

## 2018-12-13 PROCEDURE — 80048 BASIC METABOLIC PNL TOTAL CA: CPT | Performed by: INTERNAL MEDICINE

## 2018-12-13 PROCEDURE — 25000132 ZZH RX MED GY IP 250 OP 250 PS 637: Mod: GY | Performed by: INTERNAL MEDICINE

## 2018-12-13 PROCEDURE — 25000131 ZZH RX MED GY IP 250 OP 636 PS 637: Mod: GY | Performed by: INTERNAL MEDICINE

## 2018-12-13 PROCEDURE — 99239 HOSP IP/OBS DSCHRG MGMT >30: CPT | Performed by: INTERNAL MEDICINE

## 2018-12-13 PROCEDURE — 00000146 ZZHCL STATISTIC GLUCOSE BY METER IP

## 2018-12-13 PROCEDURE — 85027 COMPLETE CBC AUTOMATED: CPT | Performed by: INTERNAL MEDICINE

## 2018-12-13 PROCEDURE — 25000128 H RX IP 250 OP 636: Performed by: INTERNAL MEDICINE

## 2018-12-13 RX ORDER — HYDRALAZINE HYDROCHLORIDE 25 MG/1
25 TABLET, FILM COATED ORAL 3 TIMES DAILY
Qty: 90 TABLET | Refills: 0 | Status: ON HOLD | COMMUNITY
Start: 2018-12-13 | End: 2019-01-01

## 2018-12-13 RX ORDER — ALBUMIN (HUMAN) 12.5 G/50ML
50 SOLUTION INTRAVENOUS
Status: DISCONTINUED | OUTPATIENT
Start: 2018-12-13 | End: 2018-12-13

## 2018-12-13 RX ORDER — AMOXICILLIN AND CLAVULANATE POTASSIUM 500; 125 MG/1; MG/1
1 TABLET, FILM COATED ORAL EVERY EVENING
Qty: 7 TABLET | Refills: 0 | Status: ON HOLD | OUTPATIENT
Start: 2018-12-13 | End: 2019-05-03

## 2018-12-13 RX ORDER — ALBUMIN, HUMAN INJ 5% 5 %
250 SOLUTION INTRAVENOUS
Status: DISCONTINUED | OUTPATIENT
Start: 2018-12-13 | End: 2018-12-13

## 2018-12-13 RX ADMIN — GUAIFENESIN 600 MG: 600 TABLET, EXTENDED RELEASE ORAL at 13:21

## 2018-12-13 RX ADMIN — SODIUM CHLORIDE 300 ML: 9 INJECTION, SOLUTION INTRAVENOUS at 08:20

## 2018-12-13 RX ADMIN — INSULIN ASPART 1 UNITS: 100 INJECTION, SOLUTION INTRAVENOUS; SUBCUTANEOUS at 13:25

## 2018-12-13 RX ADMIN — ERYTHROPOIETIN 10000 UNITS: 10000 INJECTION, SOLUTION INTRAVENOUS; SUBCUTANEOUS at 08:50

## 2018-12-13 RX ADMIN — HYDRALAZINE HYDROCHLORIDE 10 MG: 20 INJECTION INTRAMUSCULAR; INTRAVENOUS at 00:07

## 2018-12-13 RX ADMIN — INSULIN ASPART 2 UNITS: 100 INJECTION, SOLUTION INTRAVENOUS; SUBCUTANEOUS at 07:30

## 2018-12-13 RX ADMIN — PANTOPRAZOLE SODIUM 40 MG: 40 TABLET, DELAYED RELEASE ORAL at 06:52

## 2018-12-13 RX ADMIN — Medication: at 08:21

## 2018-12-13 RX ADMIN — CARVEDILOL 12.5 MG: 12.5 TABLET, FILM COATED ORAL at 13:22

## 2018-12-13 RX ADMIN — GABAPENTIN 300 MG: 300 CAPSULE ORAL at 13:22

## 2018-12-13 RX ADMIN — ASPIRIN 81 MG: 81 TABLET, COATED ORAL at 13:22

## 2018-12-13 RX ADMIN — INSULIN GLARGINE 25 UNITS: 100 INJECTION, SOLUTION SUBCUTANEOUS at 07:32

## 2018-12-13 RX ADMIN — ACETAMINOPHEN 650 MG: 325 TABLET, FILM COATED ORAL at 10:09

## 2018-12-13 RX ADMIN — HYDRALAZINE HYDROCHLORIDE 25 MG: 25 TABLET ORAL at 13:22

## 2018-12-13 RX ADMIN — SODIUM CHLORIDE 250 ML: 9 INJECTION, SOLUTION INTRAVENOUS at 08:20

## 2018-12-13 ASSESSMENT — ACTIVITIES OF DAILY LIVING (ADL)
ADLS_ACUITY_SCORE: 19
ADLS_ACUITY_SCORE: 17
ADLS_ACUITY_SCORE: 19
ADLS_ACUITY_SCORE: 19
ADLS_ACUITY_SCORE: 17

## 2018-12-13 ASSESSMENT — MIFFLIN-ST. JEOR: SCORE: 1687.13

## 2018-12-13 NOTE — PROGRESS NOTES
Prescription for Augmentin given; pt and wife deny questions; leaving via wheelchair per volunteer

## 2018-12-13 NOTE — PLAN OF CARE
[id number]: Assumed care of pt.  Tele: SR, HR 73. Pt AVF w/(+) bruit & thrill,  Disposition anticipated for home w/home PT after dialysis run on 12/13/18. Report given to on-coming RN.  Geetha Li, BSN, RN  Medical/Telemetry - 3

## 2018-12-13 NOTE — PLAN OF CARE
/71, came down to 153/75 after scheduled hydralazine given. Denies pain. Lexiscan stress test done today. Tolerating diet,  and 157. Ambulated in hallway x2 today. Plan for possible discharge home tomorrow after dialysis. Tele - SR. Will continue to monitor.

## 2018-12-13 NOTE — PLAN OF CARE
A/Ox4, VS - SBP 190s - PRN hydralazine given SBP 170s, SBA with walker for all transfers, Tele SR, dialysis, , denies pain, LS diminished, PO Augmentin, fistulas to L arm, possible discharge today - home with [facility], continue with plan of care.

## 2018-12-13 NOTE — PROGRESS NOTES
Discharge Planner   Discharge Plans in progress: Home with [facility]  Barriers to discharge plan: None anticipated  Follow up plan: Pt will discharge home with homecare after dialysis. Met with pt-prefers [facility]. Referral to be sent for HC at discharge-likely PT/OT/RN.        Entered by: Usha Land 12/13/2018 10:28 AM

## 2018-12-13 NOTE — PROGRESS NOTES
Potassium   Date Value Ref Range Status   12/13/2018 4.3 3.4 - 5.3 mmol/L Final   ]  Lab Results   Component Value Date    HGB 7.9 12/13/2018     Weight: 90.5 kg (199 lb 8.3 oz)    DIALYSIS PROCEDURE NOTE    Patient dialyzed for 3.75 hrs. on a 3 K bath with a net fluid removal of  3L.  A BFR of 400 ml/min was obtained via a LUAF using 15guage needles and all connections were secured. The patient was seen by Dr. Marcial during the treatment.  Total heparin received during the treatment: 0 units. Sites held x 15 min then  pressure drsgs applied.  Meds  given:epogen Complications: none.  Procedure and ESRD teaching done and questions answered. See flowsheet in EPIC for further details.  Hepatitis B labs verified on machine log from previous patient.  Medical aseptic prep utilized  Patient transported via  W/c to the dialysis unit.  Water alarm in place and used.  [facility] consent form signed yes  Incapacitated dialysis nurse policy reviewed.

## 2018-12-13 NOTE — PROGRESS NOTES
Inpatient Dialysis Progress Note        Assessment and Plan:     68 y.o gentleman with ESRD, admitted weakness, cough and fever.      # ESRD  # HIV  # CAD  # Anemia  # HTN  # CKD-MBD  # Infection? On Augmentin.            Interval History:     Afebrile. VSS. He is read to go home. No issue with HD treatment. No cardiopulmonary complaints.          Dialysis Parameters:     Vitals:    12/10/18 0638 12/11/18 0713 12/13/18 0747   Weight: 90.8 kg (200 lb 3.2 oz) 90.8 kg (200 lb 2.8 oz) 90.5 kg (199 lb 8.3 oz)     I/O last 3 completed shifts:  In: 480 [P.O.:480]  Out: -   Temp:  [97.4  F (36.3  C)-98.2  F (36.8  C)] 97.8  F (36.6  C)  Pulse:  [74-80] 74  Heart Rate:  [66-77] 74  Resp:  [16-20] 20  BP: (149-203)/(65-96) 173/82  SpO2:  [92 %-98 %] 97 %    Current Weight: 199  Dry Weight:   Dialysis Temp: 36.5  C  Access Device: AVF  Access Site: left arm  Dialyzer: Revaclear  Dialysis Bath: 3K  Sodium Profile: None  UF Goal: 3 liters  Blood Flow Rate (mL/min): 400  Total Treatment Time (hrs): 3.45  Heparin: none    Review of Systems:        Medications:     EPO dose: 13083 units    Physical Exam:     Vitals were reviewed  Patient Vitals for the past 24 hrs:   BP Temp Temp src Pulse Heart Rate Resp SpO2 Weight   12/13/18 1130 173/82 -- -- -- 74 -- -- --   12/13/18 1115 156/84 -- -- -- 72 -- -- --   12/13/18 1100 182/84 -- -- -- 74 -- -- --   12/13/18 1045 155/82 -- -- -- 73 -- -- --   12/13/18 1030 179/80 -- -- -- 73 -- -- --   12/13/18 1015 171/78 -- -- -- 72 -- -- --   12/13/18 1000 152/73 -- -- -- 73 -- -- --   12/13/18 0945 161/69 -- -- -- 70 -- -- --   12/13/18 0930 149/65 -- -- -- 71 -- -- --   12/13/18 0915 (!) 191/91 -- -- -- 75 -- -- --   12/13/18 0900 187/88 -- -- -- 71 -- -- --   12/13/18 0845 181/85 -- -- -- 75 -- -- --   12/13/18 0830 (!) 203/96 -- -- -- 72 -- -- --   12/13/18 0815 (!) 178/94 -- -- -- 66 -- -- --   12/13/18 0800 (!) 186/94 -- -- -- 69 -- -- --   12/13/18 0747 (!) 190/95 97.8  F (36.6  C) Oral  -- 67 20 97 % 90.5 kg (199 lb 8.3 oz)   18 0718 170/80 97.6  F (36.4  C) Oral 74 -- 18 98 % --   18 0034 174/76 -- -- -- 76 -- -- --   18 2339 198/77 98.2  F (36.8  C) Oral 80 -- 16 92 % --   18 2312 185/77 97.8  F (36.6  C) Oral -- 73 16 92 % --   18 2223 182/74 -- -- -- -- -- -- --   18 1651 153/75 -- -- -- 77 -- -- --   18 1522 181/71 97.4  F (36.3  C) Oral -- 72 16 92 % --       Temperatures:  Current - Temp: 97.8  F (36.6  C); Max - Temp  Av.8  F (36.6  C)  Min: 97.4  F (36.3  C)  Max: 98.2  F (36.8  C)  Respiration range: Resp  Av.2  Min: 16  Max: 20  Pulse range: Pulse  Av  Min: 74  Max: 80  Blood pressure range: Systolic (24hrs), Av , Min:149 , Max:203   ; Diastolic (24hrs), Av, Min:65, Max:96    Pulse oximetry range: SpO2  Av.2 %  Min: 92 %  Max: 98 %  I/O last 3 completed shifts:  In: 480 [P.O.:480]  Out: -     Intake/Output Summary (Last 24 hours) at 2018 1141  Last data filed at 2018 1011  Gross per 24 hour   Intake 580 ml   Output --   Net 580 ml       GENERAL APPEARANCE: pleasant, NAD, alert  HEENT:  Eyes/ears/nose/neck grossly normal  RESP: lungs cta b c good efforts, no crackles, rhonchi or wheezes  CV: RRR, nl S1/S2, + murmur  ABDOMEN: o/s/nt/nd, bs present  EXTREMITIES/SKIN: no rashes/lesions on observed skin; no edema  Neuro: a/o x 3             Data:     Recent Labs   Lab Test 18  0800 18  0640    133   POTASSIUM 4.3 4.4   CHLORIDE 95 95   CO2 28 28   ANIONGAP 10 10   * 126*   BUN 62* 55*   CR 8.89* 9.63*   PRABHA 9.1 9.2       Hemoglobin   Date Value Ref Range Status   2018 7.9 (L) 13.3 - 17.7 g/dL Final              Doyle Jerardo Marcial MD

## 2018-12-14 ENCOUNTER — TELEPHONE (OUTPATIENT)
Dept: PHARMACY | Facility: OTHER | Age: 68
End: 2018-12-14

## 2018-12-14 NOTE — PROGRESS NOTES
Hand-off for Care Transitions to Next Level of Care Provider  Name: Donald Raza  : 1950  MRN #: [id number]  Reason for Hospitalization:  Generalized muscle weakness [M62.81]  End stage renal disease on dialysis (H) [N18.6, Z99.2]  Febrile illness [R50.9]  Admit Date/Time: 2018  8:45 PM  Discharge Date: 2018    Reason for Communication Hand-off Referral: Fragility  Other  Sepsis    Discharge Plan:  Discharged to: Home with homecare                   Patient agreeable to post-hospital support suggestions:  Yes    Patient is on new medications:   Yes    MTM follow up recommended: Yes    Tel-Assurance program:  Already enrolled in a TA program    Patient will receive  HOME CARE  at:  discharge through [facility].     Concern for non-adherence with plan of care:  No    Follow-up specialty is recommended: Yes. Follow up with [facility] for OT, PT & RN services. Follow up with Cardiology Clinic as recommended. Also, also f/u with MTM referral as ordered.     Follow-up plan:  No future appointments.    Any outstanding tests or procedures:  No.       Referrals     Future Labs/Procedures    Home care nursing referral     Comments:    RN skilled nursing visit. RN to assess vital signs and weight, patients ability to take and record daily blood pressure, temp and weight, pain level and activity tolerance, hydration, nutrition and bowel status and home safety.      Your provider has ordered home care nursing services. If you have not been contacted within 2 days of your discharge please call the inpatient department phone number at [phone] .    Home Care OT Referral for Hospital Discharge     Comments:    OT to eval and treat    Your provider has ordered home care - occupational therapy. If you have not been contacted within 2 days of your discharge please call the department phone number listed on the top of this document.    Home Care PT Referral for Hospital Discharge     Comments:     PT to eval and treat    Your provider has ordered home care - physical therapy. If you have not been contacted within 2 days of your discharge please call the department phone number listed on the top of this document.    Medication Therapy Management Referral     Comments:    MTM referral reason            Patient had a hospital or ED visit in last 6 months and has more than 10   PTA or Discharge medications    Patient has 5 PTA or Discharge Medications AND one of the following   diagnoses: DM,HF,COPD,AMI DX,PULM HTN       This service is designed to help you get the most from your medications.  A specially trained pharmacist will work closely with you and your doctors  to solve any problems related to your medications and to help you get the   best results from taking them.      The Medication Therapy Management staff will call you to schedule an appointment.          Key Recommendations:   CTS identifies pt as high risk due to elevated MUSHTAQ. Pt currently admitted for Sepsis, this is his 3rd admission in 6 months. Per chart review, resides at home w/ wife, his dtr is PCA 9 hrs/day M-F. Pt [facility] HD T[facility]Th,Sat [facility]. Per notes, his baseline mobility is ambulating w/ cane. He had a PT eval while here. He has had [facility] in the past but was discharged from them in October 2018. Plan on discharge is to resume [facility] for OT, PT & RN services.    Radha Mayfield    Communicated handoff via EPIC Comm Mgt to Dr. Aida Thomas's CC at [phone].     Sent by Ruby Hinojosa, RN, BSN, CTS  [facility]  [phone]

## 2018-12-14 NOTE — PLAN OF CARE
Physical Therapy Discharge Summary    Reason for therapy discharge:    Discharged to home with home therapy.    Progress towards therapy goal(s). See goals on Care Plan in [facility] electronic health record for goal details.  Goals partially met.  Barriers to achieving goals:   discharge from facility.    Therapy recommendation(s):    Continued therapy is recommended.  Rationale/Recommendations:  HHPT to maximize safety and indep within the home.     Note: Pt not seen by documenting PT on this date. Information obtained from chart review.

## 2018-12-21 NOTE — DISCHARGE SUMMARY
Discharge Summary  [facility]     Donald Raza MRN# [id number]   YOB: 1950 Age: 68 year old     Date of Admission:  12/5/2018  Date of Discharge:  12/13/2018  4:03 PM  Admitting Physician:  Joshua Diaz MD  Discharge Physician:  Lucita Downing MD  Discharging Service:  Hospitalist    Primary Provider: Aida Thomas            Discharge Diagnosis:   # Sepsis 2/2 suspected community acquired pneumonia   # ESRD on dialysis   # Bacteriuria  # Underlying HTN/CAD  # Episode of chest pain. Stress test done during hospital stay   # IDDM  # HIV. Well controlled per report           Brief History of Illness:   Please refer to admission H&P for details of presenting illness. In brief, this is a  68 year old male who presented with weakness, cough and fever.           Hospital Course:      This is a 68-year-old gentleman with a history of end-stage renal disease on hemodialysis Tuesdays, Thursdays, Saturdays; HIV with CD4 count greater than 400 per the patient; hyperlipidemia; coronary artery disease with prior PCI; prior history of TIA, who gets periodic blood transfusions every 3-4 months, came for evaluation for generalized weakness, cough, and fever. Also had mild headaches.  He did have some clinical evidence of acute sepsis which was suspected to be secondary to viral etiology but unable to rule out bacterial infection and pneumonia. Also had positive urine culture did grow out strep group C/G. Patient was initially covered with vancomycin and Zosyn in the ED but then transitioned to Zosyn alone on 12/10/18 and now currently on Augmentin.    Since admission, patient's progress have been waxing and waning but seems to be gradually improving with improvement of strength.  There was some initial plans for discharge to TCU but patient has progressed well, become much stronger and is quite reluctant to go to TCU.  Now plan is for to discharge home. He has been able to ambulate with  standby assist and walker.        Patient was seen by me on the day of discharge. Plan was for discharge home today per previous provider if tolerating diet, ambulating and Hb stable. He was seen after dialysis and feels quite well, requesting discharge home. This is a complicated patient with multiple co-morbids although appears stable for discharge at this time. Individual issues addressed during the hospital course are as follows:    1. Acute sepsis as evident by fever, lactic acidosis, and notable source of infection: Most likely 22 community acquired pneumonia. mission CT showed mild bilateral infiltrates. Suspect viral etiology but cannot rule out bacterial infection and community acquired pneumonia. Urine culture also growing out strep although unclear if true infection in this patient on dialysis and no pyuria on UA. Will discharge home on Augmentin to complete the course of antibiotics.     2. History of coronary artery disease status post PCI: On the early a.m. of 12/11/18, patient did develop some chest pain that was relieved with nitroglycerin.  Given his known history of coronary artery disease and previous angiogram in March 2018 that reviewed some stenosis of the bifurcated diagonal artery, a Lindy stress scan on 12/12/18 was performed which demonstrated no significant areas of ischemia and otherwise unchanged from 2017.  Will continue baby aspirin, atorvastatin, Plavix, carvedilol, hydralazine, and Imdur.   3. Of note, patient tells me that he is no longer taking his hydralazine and wondering if he should resume it. In the past, he had issues with low BP with dialysis causing hypoperfusion and TIA like symptoms. However, his BP in the hospital during this stay was on high side. I advised to resume the hydralazine as he was receiving it during the hospital as well and certainly a major risk factor for stroke if BP is not adequately controlled. I have advised him to monitor his BP and discuss with  PCP/nephrologist about stopping it if did have issues with post-dialysis hypotension.   4. Generalized weakness: Patient reluctant for TCU placement and now plan for discharge home.   5. ESRD on dialysis   6. HIV     Follow up after Discharge:    Follow up with primary care provider,  within 7 days for hospital follow- up.      Continue follow up with cardiology clinic     Continue to monitor the blood pressure at home and with dialysis. Stop taking the hydralazine if having issues with low blood pressure    GATED MYOCARDIAL PERFUSION SCINTIGRAPHY WITH INTRAVENOUS PHARMACOLOGIC  VASODILATATION LEXISCAN -ONE DAY STUDY      12/12/2018 9:42 AM  GREGORIO BRUSH  68 years  Male  1950.     Indication/Clinical History: Chest pain     Impression  1.  Myocardial perfusion imaging using single isotope technique  demonstrated normal myocardial perfusion.   2. Gated images demonstrated normal wall motion.  The left ventricular  systolic function is normal with a calculated ejection fraction of  59%.  3. Compared to the prior study from 2017, no significant changes are  noted .         Discharge Procedure Orders   Medication Therapy Management Referral   Referral Type: Med Therapy Management     Home care nursing referral   Referral Type: Home Health Therapies & Aides   Number of Visits Requested: 1     Home Care PT Referral for Hospital Discharge   Referral Type: Home Health Therapies & Aides   Number of Visits Requested: 1     Home Care OT Referral for Hospital Discharge   Number of Visits Requested: 1     Reason for your hospital stay   Order Comments: Concern for Pneumonia     Follow-up and recommended labs and tests    Order Comments: Follow up with primary care provider,  within 7 days for hospital follow- up.      Continue follow up with cardiology clinic     Continue to monitor the blood pressure at home and with dialysis. Stop taking the hydralazine if having issues with low blood pressure     Activity   Order  Comments: Your activity upon discharge: activity as tolerated     Order Specific Question Answer Comments   Is discharge order? Yes      MD face to face encounter   Order Comments: Documentation of Face to Face and Certification for Home Health Services    I certify that patient: Donald Raza is under my care and that I, or a nurse practitioner or physician's assistant working with me, had a face-to-face encounter that meets the physician face-to-face encounter requirements with this patient on: 12/13/2018.    This encounter with the patient was in whole, or in part, for the following medical condition, which is the primary reason for home health care: pneumonia, esrd on dialysis    I certify that, based on my findings, the following services are medically necessary home health services: Nursing, Occupational Therapy and Physical Therapy.    My clinical findings support the need for the above services because: Nurse is needed: To assess vitals after changes in medications or other medical regimen.., Occupational Therapy Services are needed to assess and treat cognitive ability and address ADL safety due to impairment in cognition. and Physical Therapy Services are needed to assess and treat the following functional impairments: mobility.    Further, I certify that my clinical findings support that this patient is homebound (i.e. absences from home require considerable and taxing effort and are for medical reasons or [employer] services or infrequently or of short duration when for other reasons) because: Requires assistance of another person or specialized equipment to access medical services because patient: Is unable to exit home safely on own     Based on the above findings. I certify that this patient is confined to the home and needs intermittent skilled nursing care, physical therapy and/or speech therapy.  The patient is under my care, and I have initiated the establishment of the plan of care.  This patient  will be followed by a physician who will periodically review the plan of care.  Physician/Provider to provide follow up care: Aida Thomas    Attending hospital physician (the Medicare certified [place] provider): Lucita Downing  Physician Signature: See electronic signature associated with these discharge orders.  Date: 12/13/2018     Diet   Order Comments: Follow this diet upon discharge: Orders Placed This Encounter      Snacks/Supplements Adult: Nepro Oral Supplement; With Meals      Combination Diet Dialysis Diet;     Order Specific Question Answer Comments   Is discharge order? Yes             Discharge Medications:     Discharge Medication List as of 12/13/2018  2:51 PM      START taking these medications    Details   amoxicillin-clavulanate (AUGMENTIN) 500-125 MG tablet Take 1 tablet by mouth every evening for 7 days, Disp-7 tablet, R-0, E-Prescribe         CONTINUE these medications which have CHANGED    Details   hydrALAZINE (APRESOLINE) 25 MG tablet Take 1 tablet (25 mg) by mouth 3 times daily, Disp-90 tablet, R-0, HistoricalFuture refills by PCP Dr. Aida Thomas with phone number [phone].         CONTINUE these medications which have NOT CHANGED    Details   abacavir (ZIAGEN) 300 MG tablet Take 600 mg by mouth every evening , Historical      Acetaminophen (TYLENOL PO) Take by mouth as needed for mild pain or fever, Historical      albuterol (PROAIR HFA/PROVENTIL HFA/VENTOLIN HFA) 108 (90 BASE) MCG/ACT Inhaler Inhale 2 puffs into the lungs every 6 hours as needed for shortness of breath / dyspnea or wheezing, Disp-1 Inhaler, R-1, E-Prescribe      aspirin EC 81 MG EC tablet Take 1 tablet (81 mg) by mouth daily, Disp-30 tablet, R-0, E-Prescribe      atorvastatin (LIPITOR) 40 MG tablet Take 40 mg by mouth At Bedtime, Historical      B COMPLEX-C-FOLIC ACID PO Take 1 tablet by mouth At Bedtime , Historical      carvedilol (COREG) 12.5 MG tablet Take 1 tablet (12.5 mg) by mouth 2 times daily  (with meals), Disp-60 tablet, R-0, E-Prescribe      chlorhexidine (CHLORHEXIDINE) 0.12 % solution Rinse and gargle 15 ml by mouth twice a day as directed., Historical      CLONAZEPAM PO Take 0.5 mg by mouth nightly as needed for anxiety (restless legs), Historical      CLOPIDOGREL BISULFATE PO Take 75 mg by mouth every evening , Historical      dapsone 100 MG tablet Take 100 mg by mouth At Bedtime , Historical      Darunavir Ethanolate (PREZISTA PO) Take 800 mg by mouth At Bedtime., Historical      dolutegravir (TIVICAY) 50 MG tablet Take 50 mg by mouth At Bedtime, Historical      DOXERCALCIFEROL IV Inject 6 mcg into the vein three times a week (with dialysis), Historical      epoetin brittni (EPOGEN,PROCRIT) 37799 UNIT/ML injection Inject 11,000 Units Subcutaneous three times a week WITH DIALYSIS, Historical      gabapentin (NEURONTIN) 300 MG capsule Take 300 mg by mouth 2 times daily May take a third dose after dialysis., Historical      guaiFENesin (MUCINEX) 600 MG 12 hr tablet Take by mouth as needed for congestion, Historical      imiquimod (ALDARA) 5 % cream Apply topically as neededHistorical      insulin glargine (LANTUS) 100 UNIT/ML injection Inject 25 Units Subcutaneous 2 times daily , Historical      !! Insulin Lispro (HUMALOG PEN SC) Take 15 units TID and an additional 2 units for every 50 mg/dL if BG is over 150, Historical      !! insulin lispro (HUMALOG PEN) 100 UNIT/ML pen Inject Subcutaneous 3 times daily (before meals) Sliding scale: takes 2 units for every 50 mg/dL over 150., Historical      ipratropium - albuterol 0.5 mg/2.5 mg/3 mL (DUONEB) 0.5-2.5 (3) MG/3ML neb solution Take 1 vial (3 mLs) by nebulization every 6 hours as needed for shortness of breath / dyspnea or wheezing, Disp-90 vial, R-1, E-Prescribe      irbesartan (AVAPRO) 300 MG tablet Take 1 tablet (300 mg) by mouth At Bedtime, Disp-30 tablet, R-0, E-Prescribe      iron sucrose (VENOFER) 20 MG/ML injection Inject 50 mg into the vein once  "a week WIth dialysis, Historical      Isosorbide Mononitrate  MG TB24 Take 1 tablet (120 mg) by mouth every evening, Disp-30 tablet, R-11, E-Prescribe      ketoconazole (NIZORAL) 2 % cream Apply topically 2 times daily as neededHistorical      MAGNESIUM OXIDE PO Take 400 mg by mouth At Bedtime, Historical      mirtazapine (REMERON) 15 MG tablet Take 15 mg by mouth At Bedtime, Historical      nitroglycerin (NITROSTAT) 0.4 MG SL tablet One tablet under the tongue every 5 minutes if needed for chest pain. May repeat every 5 minutes for a maximum of 3 doses in 15 minutes\", Disp-25 tablet, R-3, Fax      Nutritional Supplements (NEPRO PO) 1-3 times daily, Historical      omega-3 acid ethyl esters (LOVAZA) 1 G capsule Take 2 g by mouth 2 times daily, Historical      order for DME Equipment being ordered: Other: 4WW  Treatment Diagnosis: Decreased activity toleranceDisp-1 each, R-0, Local Print      pantoprazole (PROTONIX) 40 MG EC tablet Take 40 mg by mouth daily, Historical      ritonavir (NORVIR) 100 MG capsule Take 1 capsule by mouth At Bedtime , Historical      sucroferric oxyhydroxide (VELPHORO) 500 MG CHEW chewable tablet Take 500 mg by mouth 3 times daily (with meals), Historical       !! - Potential duplicate medications found. Please discuss with provider.                 Condition on Discharge:   Discharge condition: Stable   Discharge vitals: Blood pressure 158/79, pulse 74, temperature 98.7  F (37.1  C), temperature source Oral, resp. rate 20, height 1.803 m (5' 11\"), weight 90.5 kg (199 lb 8.3 oz), SpO2 97 %.   Patient appears alert comfortable without any signs of distress   Lungs are clear to auscultation bilaterally, Regular rate and rhythm and normal S1, S2, Abdomen is soft and non-tender, mentation is clear.          Consultations:   PHARMACY TO DOSE [facility]  PHARMACY TO DOSE [facility]  NEPHROLOGY IP CONSULT  PHYSICAL THERAPY ADULT IP CONSULT  OCCUPATIONAL THERAPY ADULT IP CONSULT  SOCIAL WORK IP " CONSULT  PHARMACY DISCHARGE EDUCATION BY PHARMACIST  SOCIAL WORK IP CONSULT                 Pending Results:   Unresulted Labs Ordered in the Past 30 Days of this Admission     No orders found from 10/6/2018 to 12/6/2018.                    Procedures / Labs / Imaging:     Results for orders placed or performed during the hospital encounter of 12/05/18   XR Chest 2 Views    Narrative    CHEST TWO VIEW   12/5/2018 10:42 PM     HISTORY: Fever.     COMPARISON: 9/3/2018      Impression    IMPRESSION: Lateral view is limited due to motion artifact. Lungs are  hypoinflated with perihilar and basilar opacities which are favored to  represent atelectasis. Superimposed subtle infection or aspiration  cannot be completely excluded. Right upper lobe aeration has improved.  Heart size is unchanged.    MAI FAUSTIN MD   Chest CT w/o contrast    Narrative    CT CHEST W/O CONTRAST  12/5/2018 11:44 PM    HISTORY: Fever. Evaluate for occult pneumonia.    TECHNIQUE: Scans obtained from the apices through the diaphragm  without IV contrast. Radiation dose for this scan was reduced using  automated exposure control, adjustment of the mA and/or kV according  to patient size, or iterative reconstruction technique.    COMPARISON: 12/25/2017.    FINDINGS: There is patchy infiltrate in the left upper lobe and to a  lesser extent in the right upper lobe and left lower lobe. There is  dependent atelectasis bilaterally. No pneumothorax or pleural  effusion. There are coronary artery atherosclerotic calcifications.  The heart is enlarged. The thoracic aorta is calcified and tortuous.  No aortic aneurysm. There is a borderline enlarged precarinal lymph  node. No lymph node enlargement. Images through the upper abdomen show  no acute abnormalities. The gallbladder is absent. Multiple vascular  calcifications.      Impression    IMPRESSION: Mild left upper lobe pneumonia and minimal infiltrates in  the right upper lobe and left lower  lobe.    LIGIA RIVERO MD   NM MPI w Lexiscan    Narrative    GATED MYOCARDIAL PERFUSION SCINTIGRAPHY WITH INTRAVENOUS PHARMACOLOGIC  VASODILATATION LEXISCAN -ONE DAY STUDY     12/12/2018 9:42 AM  GREGORIO BRUSH  68 years  Male  1950.    Indication/Clinical History: Chest pain    Impression  1.  Myocardial perfusion imaging using single isotope technique  demonstrated normal myocardial perfusion.   2. Gated images demonstrated normal wall motion.  The left ventricular  systolic function is normal with a calculated ejection fraction of  59%.  3. Compared to the prior study from 2017, no significant changes are  noted .    Procedure  Pharmacologic stress testing was performed with Lexiscan at a rate of  0.08 mg/ml rapid bolus injection, for 15 seconds, 0.4 mg/5ml  intravenously. Low-level exercise was not performed along with the  vasodilator infusion.  The heart rate was 73 at baseline and cayetano to  82 beats per minute during the Lexiscan infusion. The rest blood  pressure was 183/81 mmHg and was 148/65 mm Hg during Lexiscan  infusion. The patient experienced shortness of breath, headache,  left-sided chest pain  during the test.    Myocardial perfusion imaging was performed at rest, approximately 45  minutes after the injection intravenously of 10.7 mCi of Tc-99m  Myoview. At peak pharmacologic effect, 10-20 seconds after Lexiscan,   the patient was injected intravenously with 32.6 mCi of  Tc-99m  Myoview. The post-stress tomographic imaging was performed  approximately 60 minutes after stress.    EKG Findings  The resting EKG demonstrated normal findings. The stress EKG  demonstrated no significant ST segment changes.    Tomographic Findings  Overall, the study quality is adequate . On the stress images, there  is a small inferior wall defect with a mild to moderate reduction in  radiotracer uptake. On the rest images, no significant changes were  seen to this small inferior wall defect . Gated images  demonstrated  normal wall motion. The small inferior wall defect with normal wall  motion is likely due to diaphragmatic attenuation. The left  ventricular ejection fraction was calculated to be 59%. TID was  absent. Body mass index 27.9.    SUSAN FRANKS MD       Plan of care and discharge instructions were reviewed with the patient in great detail. All questions were answered appropriately. Patient is comfortable with the plan and agrees with it. All new medications including the side effects were reviewed with the patient.     Total time spent in face to face contact with the patient and coordinating discharge was: Greater than 30 Minutes      Allergies:      Allergies   Allergen Reactions     Calcium Acetate Other (See Comments)     Other reaction(s): Other, see comments  Pain in chest and back  Pain in chest area (sensitive skin)      Diagnostic X-Ray Materials Other (See Comments)     PN: renal failure     Lisinopril      Sulfa Drugs          CC: Aida Thomas

## 2019-01-01 ENCOUNTER — APPOINTMENT (OUTPATIENT)
Dept: GENERAL RADIOLOGY | Facility: CLINIC | Age: 69
DRG: 189 | End: 2019-01-01
Attending: EMERGENCY MEDICINE
Payer: MEDICARE

## 2019-01-01 ENCOUNTER — HOSPITAL ENCOUNTER (INPATIENT)
Facility: CLINIC | Age: 69
LOS: 2 days | Discharge: HOME HEALTH | DRG: 304 | End: 2019-05-15
Attending: EMERGENCY MEDICINE | Admitting: INTERNAL MEDICINE
Payer: MEDICARE

## 2019-01-01 ENCOUNTER — APPOINTMENT (OUTPATIENT)
Dept: GENERAL RADIOLOGY | Facility: CLINIC | Age: 69
DRG: 304 | End: 2019-01-01
Attending: EMERGENCY MEDICINE
Payer: MEDICARE

## 2019-01-01 ENCOUNTER — APPOINTMENT (OUTPATIENT)
Dept: PHYSICAL THERAPY | Facility: CLINIC | Age: 69
End: 2019-01-01
Attending: PHYSICIAN ASSISTANT
Payer: MEDICARE

## 2019-01-01 ENCOUNTER — APPOINTMENT (OUTPATIENT)
Dept: GENERAL RADIOLOGY | Facility: CLINIC | Age: 69
End: 2019-01-01
Attending: EMERGENCY MEDICINE
Payer: MEDICARE

## 2019-01-01 ENCOUNTER — SURGERY - HEALTHEAST (OUTPATIENT)
Dept: CARDIOLOGY | Facility: CLINIC | Age: 69
End: 2019-01-01

## 2019-01-01 ENCOUNTER — APPOINTMENT (OUTPATIENT)
Dept: OCCUPATIONAL THERAPY | Facility: CLINIC | Age: 69
DRG: 304 | End: 2019-01-01
Attending: INTERNAL MEDICINE
Payer: MEDICARE

## 2019-01-01 ENCOUNTER — HOSPITAL ENCOUNTER (INPATIENT)
Facility: CLINIC | Age: 69
LOS: 1 days | Discharge: HOME | DRG: 391 | End: 2019-07-27
Attending: EMERGENCY MEDICINE | Admitting: HOSPITALIST
Payer: MEDICARE

## 2019-01-01 ENCOUNTER — APPOINTMENT (OUTPATIENT)
Dept: PHYSICAL THERAPY | Facility: CLINIC | Age: 69
DRG: 304 | End: 2019-01-01
Payer: MEDICARE

## 2019-01-01 ENCOUNTER — ANCILLARY PROCEDURE (OUTPATIENT)
Dept: CARDIOLOGY | Facility: CLINIC | Age: 69
End: 2019-01-01
Attending: INTERNAL MEDICINE
Payer: MEDICARE

## 2019-01-01 ENCOUNTER — APPOINTMENT (OUTPATIENT)
Dept: GENERAL RADIOLOGY | Facility: CLINIC | Age: 69
DRG: 391 | End: 2019-01-01
Attending: EMERGENCY MEDICINE
Payer: MEDICARE

## 2019-01-01 ENCOUNTER — APPOINTMENT (OUTPATIENT)
Dept: PHYSICAL THERAPY | Facility: CLINIC | Age: 69
DRG: 304 | End: 2019-01-01
Attending: INTERNAL MEDICINE
Payer: MEDICARE

## 2019-01-01 ENCOUNTER — DOCUMENTATION ONLY (OUTPATIENT)
Dept: CARDIOLOGY | Facility: CLINIC | Age: 69
End: 2019-01-01

## 2019-01-01 ENCOUNTER — HOSPITAL ENCOUNTER (INPATIENT)
Facility: CLINIC | Age: 69
LOS: 11 days | Discharge: HOME HEALTH | DRG: 304 | End: 2019-10-26
Attending: EMERGENCY MEDICINE | Admitting: INTERNAL MEDICINE
Payer: MEDICARE

## 2019-01-01 ENCOUNTER — APPOINTMENT (OUTPATIENT)
Dept: GENERAL RADIOLOGY | Facility: CLINIC | Age: 69
DRG: 189 | End: 2019-01-01
Attending: INTERNAL MEDICINE
Payer: MEDICARE

## 2019-01-01 ENCOUNTER — HOME CARE/HOSPICE - HEALTHEAST (OUTPATIENT)
Dept: HOME HEALTH SERVICES | Facility: HOME HEALTH | Age: 69
End: 2019-01-01

## 2019-01-01 ENCOUNTER — DOCUMENTATION ONLY (OUTPATIENT)
Dept: CARE COORDINATION | Facility: CLINIC | Age: 69
End: 2019-01-01

## 2019-01-01 ENCOUNTER — APPOINTMENT (OUTPATIENT)
Dept: NUCLEAR MEDICINE | Facility: CLINIC | Age: 69
End: 2019-01-01
Attending: EMERGENCY MEDICINE
Payer: MEDICARE

## 2019-01-01 ENCOUNTER — TELEPHONE (OUTPATIENT)
Dept: CARDIOLOGY | Facility: CLINIC | Age: 69
End: 2019-01-01

## 2019-01-01 ENCOUNTER — OFFICE VISIT (OUTPATIENT)
Dept: CARDIOLOGY | Facility: CLINIC | Age: 69
End: 2019-01-01
Attending: NURSE PRACTITIONER
Payer: MEDICARE

## 2019-01-01 ENCOUNTER — HOSPITAL ENCOUNTER (EMERGENCY)
Facility: CLINIC | Age: 69
Discharge: ANOTHER HOSPITAL | End: 2019-12-03
Attending: EMERGENCY MEDICINE | Admitting: EMERGENCY MEDICINE
Payer: MEDICARE

## 2019-01-01 ENCOUNTER — HOSPITAL ENCOUNTER (INPATIENT)
Facility: CLINIC | Age: 69
LOS: 1 days | Discharge: HOME HEALTH | DRG: 189 | End: 2019-11-27
Attending: EMERGENCY MEDICINE | Admitting: INTERNAL MEDICINE
Payer: MEDICARE

## 2019-01-01 ENCOUNTER — HOSPITAL ENCOUNTER (OUTPATIENT)
Facility: CLINIC | Age: 69
Setting detail: OBSERVATION
Discharge: HOME | End: 2019-09-25
Attending: EMERGENCY MEDICINE | Admitting: HOSPITALIST
Payer: MEDICARE

## 2019-01-01 ENCOUNTER — TRANSFERRED RECORDS (OUTPATIENT)
Dept: HEALTH INFORMATION MANAGEMENT | Facility: CLINIC | Age: 69
End: 2019-01-01

## 2019-01-01 ENCOUNTER — APPOINTMENT (OUTPATIENT)
Dept: PHYSICAL THERAPY | Facility: CLINIC | Age: 69
DRG: 242 | End: 2019-01-01
Payer: MEDICARE

## 2019-01-01 ENCOUNTER — APPOINTMENT (OUTPATIENT)
Dept: CARDIOLOGY | Facility: CLINIC | Age: 69
DRG: 304 | End: 2019-01-01
Attending: INTERNAL MEDICINE
Payer: MEDICARE

## 2019-01-01 ENCOUNTER — HOSPITAL ENCOUNTER (EMERGENCY)
Facility: CLINIC | Age: 69
Discharge: HOME | End: 2019-08-08
Attending: EMERGENCY MEDICINE | Admitting: EMERGENCY MEDICINE
Payer: MEDICARE

## 2019-01-01 ENCOUNTER — APPOINTMENT (OUTPATIENT)
Dept: GENERAL RADIOLOGY | Facility: CLINIC | Age: 69
DRG: 304 | End: 2019-01-01
Attending: INTERNAL MEDICINE
Payer: MEDICARE

## 2019-01-01 ENCOUNTER — AMBULATORY - HEALTHEAST (OUTPATIENT)
Dept: CARDIAC REHAB | Facility: CLINIC | Age: 69
End: 2019-01-01

## 2019-01-01 ENCOUNTER — APPOINTMENT (OUTPATIENT)
Dept: CT IMAGING | Facility: CLINIC | Age: 69
DRG: 304 | End: 2019-01-01
Attending: INTERNAL MEDICINE
Payer: MEDICARE

## 2019-01-01 VITALS
SYSTOLIC BLOOD PRESSURE: 153 MMHG | RESPIRATION RATE: 18 BRPM | DIASTOLIC BLOOD PRESSURE: 67 MMHG | TEMPERATURE: 98.5 F | WEIGHT: 196.9 LBS | HEART RATE: 67 BPM | OXYGEN SATURATION: 92 % | HEIGHT: 71 IN | BODY MASS INDEX: 27.56 KG/M2

## 2019-01-01 VITALS
OXYGEN SATURATION: 92 % | BODY MASS INDEX: 29 KG/M2 | TEMPERATURE: 97.7 F | RESPIRATION RATE: 16 BRPM | SYSTOLIC BLOOD PRESSURE: 179 MMHG | DIASTOLIC BLOOD PRESSURE: 75 MMHG | HEART RATE: 63 BPM | WEIGHT: 207.9 LBS

## 2019-01-01 VITALS
SYSTOLIC BLOOD PRESSURE: 169 MMHG | BODY MASS INDEX: 27.99 KG/M2 | OXYGEN SATURATION: 95 % | WEIGHT: 199.96 LBS | TEMPERATURE: 98.3 F | HEIGHT: 71 IN | HEART RATE: 68 BPM | RESPIRATION RATE: 18 BRPM | DIASTOLIC BLOOD PRESSURE: 72 MMHG

## 2019-01-01 VITALS
WEIGHT: 195.11 LBS | DIASTOLIC BLOOD PRESSURE: 57 MMHG | SYSTOLIC BLOOD PRESSURE: 143 MMHG | RESPIRATION RATE: 16 BRPM | BODY MASS INDEX: 27.21 KG/M2 | TEMPERATURE: 98.1 F | OXYGEN SATURATION: 89 % | HEART RATE: 62 BPM

## 2019-01-01 VITALS
WEIGHT: 181 LBS | SYSTOLIC BLOOD PRESSURE: 106 MMHG | DIASTOLIC BLOOD PRESSURE: 83 MMHG | BODY MASS INDEX: 25.24 KG/M2 | RESPIRATION RATE: 26 BRPM | HEART RATE: 60 BPM | OXYGEN SATURATION: 100 % | TEMPERATURE: 96.5 F

## 2019-01-01 VITALS
TEMPERATURE: 98.2 F | RESPIRATION RATE: 16 BRPM | WEIGHT: 195.4 LBS | DIASTOLIC BLOOD PRESSURE: 82 MMHG | BODY MASS INDEX: 27.25 KG/M2 | SYSTOLIC BLOOD PRESSURE: 150 MMHG | OXYGEN SATURATION: 100 % | HEART RATE: 60 BPM

## 2019-01-01 VITALS
OXYGEN SATURATION: 94 % | TEMPERATURE: 98.5 F | DIASTOLIC BLOOD PRESSURE: 80 MMHG | RESPIRATION RATE: 21 BRPM | SYSTOLIC BLOOD PRESSURE: 165 MMHG | HEART RATE: 67 BPM

## 2019-01-01 VITALS
HEART RATE: 71 BPM | TEMPERATURE: 97.6 F | DIASTOLIC BLOOD PRESSURE: 69 MMHG | RESPIRATION RATE: 16 BRPM | OXYGEN SATURATION: 91 % | BODY MASS INDEX: 26.6 KG/M2 | SYSTOLIC BLOOD PRESSURE: 125 MMHG | WEIGHT: 190.7 LBS

## 2019-01-01 VITALS
HEIGHT: 71 IN | HEART RATE: 60 BPM | WEIGHT: 203 LBS | SYSTOLIC BLOOD PRESSURE: 143 MMHG | OXYGEN SATURATION: 96 % | BODY MASS INDEX: 28.42 KG/M2 | DIASTOLIC BLOOD PRESSURE: 73 MMHG

## 2019-01-01 DIAGNOSIS — R07.9 CHEST PAIN, UNSPECIFIED TYPE: ICD-10-CM

## 2019-01-01 DIAGNOSIS — I50.21 ACUTE SYSTOLIC CONGESTIVE HEART FAILURE (H): ICD-10-CM

## 2019-01-01 DIAGNOSIS — Z99.2 ESRD (END STAGE RENAL DISEASE) ON DIALYSIS (H): Chronic | ICD-10-CM

## 2019-01-01 DIAGNOSIS — I16.0 HYPERTENSIVE URGENCY: ICD-10-CM

## 2019-01-01 DIAGNOSIS — R53.81 PHYSICAL DECONDITIONING: Primary | ICD-10-CM

## 2019-01-01 DIAGNOSIS — Z99.2 ESRD (END STAGE RENAL DISEASE) ON DIALYSIS (H): ICD-10-CM

## 2019-01-01 DIAGNOSIS — M62.81 MUSCLE WEAKNESS (GENERALIZED): ICD-10-CM

## 2019-01-01 DIAGNOSIS — I16.1 HYPERTENSIVE EMERGENCY: ICD-10-CM

## 2019-01-01 DIAGNOSIS — Z95.0 CARDIAC PACEMAKER IN SITU: Primary | ICD-10-CM

## 2019-01-01 DIAGNOSIS — R79.89 ELEVATED TROPONIN I LEVEL: ICD-10-CM

## 2019-01-01 DIAGNOSIS — I16.1 HYPERTENSIVE EMERGENCY: Primary | ICD-10-CM

## 2019-01-01 DIAGNOSIS — E87.20 LACTIC ACIDOSIS: ICD-10-CM

## 2019-01-01 DIAGNOSIS — N18.6 ESRD ON HEMODIALYSIS (H): ICD-10-CM

## 2019-01-01 DIAGNOSIS — N18.6 ESRD (END STAGE RENAL DISEASE) ON DIALYSIS (H): ICD-10-CM

## 2019-01-01 DIAGNOSIS — J96.02 ACUTE RESPIRATORY FAILURE WITH HYPERCAPNIA (H): ICD-10-CM

## 2019-01-01 DIAGNOSIS — Z95.0 CARDIAC PACEMAKER IN SITU: ICD-10-CM

## 2019-01-01 DIAGNOSIS — Z99.2 ESRD ON HEMODIALYSIS (H): ICD-10-CM

## 2019-01-01 DIAGNOSIS — E11.00 TYPE 2 DIABETES MELLITUS WITH HYPEROSMOLARITY WITHOUT COMA, WITH LONG-TERM CURRENT USE OF INSULIN (H): Chronic | ICD-10-CM

## 2019-01-01 DIAGNOSIS — M62.81 GENERALIZED MUSCLE WEAKNESS: ICD-10-CM

## 2019-01-01 DIAGNOSIS — N28.9 RENAL INSUFFICIENCY: Primary | ICD-10-CM

## 2019-01-01 DIAGNOSIS — Z79.4 TYPE 2 DIABETES MELLITUS WITH HYPEROSMOLARITY WITHOUT COMA, WITH LONG-TERM CURRENT USE OF INSULIN (H): Chronic | ICD-10-CM

## 2019-01-01 DIAGNOSIS — I16.9 HYPERTENSIVE CRISIS: ICD-10-CM

## 2019-01-01 DIAGNOSIS — K92.1 GASTROINTESTINAL HEMORRHAGE WITH MELENA: ICD-10-CM

## 2019-01-01 DIAGNOSIS — I10 UNCONTROLLED HYPERTENSION: ICD-10-CM

## 2019-01-01 DIAGNOSIS — I25.10 CORONARY ARTERY DISEASE INVOLVING NATIVE CORONARY ARTERY OF NATIVE HEART WITHOUT ANGINA PECTORIS: Primary | Chronic | ICD-10-CM

## 2019-01-01 DIAGNOSIS — R79.89 ELEVATED TROPONIN: ICD-10-CM

## 2019-01-01 DIAGNOSIS — M62.81 GENERALIZED MUSCLE WEAKNESS: Primary | ICD-10-CM

## 2019-01-01 DIAGNOSIS — N18.6 ESRD (END STAGE RENAL DISEASE) ON DIALYSIS (H): Chronic | ICD-10-CM

## 2019-01-01 DIAGNOSIS — J81.0 ACUTE PULMONARY EDEMA (H): ICD-10-CM

## 2019-01-01 DIAGNOSIS — D63.8 ANEMIA OF CHRONIC DISEASE: ICD-10-CM

## 2019-01-01 DIAGNOSIS — I50.1 PULMONARY EDEMA CARDIAC CAUSE (H): ICD-10-CM

## 2019-01-01 DIAGNOSIS — I44.7 LBBB (LEFT BUNDLE BRANCH BLOCK): ICD-10-CM

## 2019-01-01 LAB
ABO + RH BLD: NORMAL
ALBUMIN SERPL-MCNC: 3.1 G/DL (ref 3.4–5)
ALBUMIN SERPL-MCNC: 3.4 G/DL (ref 3.4–5)
ALBUMIN SERPL-MCNC: 3.7 G/DL (ref 3.4–5)
ALBUMIN SERPL-MCNC: 3.7 G/DL (ref 3.4–5)
ALBUMIN SERPL-MCNC: 3.8 G/DL (ref 3.4–5)
ALP SERPL-CCNC: 104 U/L (ref 40–150)
ALP SERPL-CCNC: 123 U/L (ref 40–150)
ALP SERPL-CCNC: 76 U/L (ref 40–150)
ALT SERPL W P-5'-P-CCNC: 12 U/L (ref 0–70)
ALT SERPL W P-5'-P-CCNC: 16 U/L (ref 0–70)
ALT SERPL W P-5'-P-CCNC: 18 U/L (ref 0–70)
AMMONIA PLAS-SCNC: 28 UMOL/L (ref 10–50)
ANION GAP SERPL CALCULATED.3IONS-SCNC: 10 MMOL/L (ref 3–14)
ANION GAP SERPL CALCULATED.3IONS-SCNC: 10 MMOL/L (ref 3–14)
ANION GAP SERPL CALCULATED.3IONS-SCNC: 11 MMOL/L (ref 3–14)
ANION GAP SERPL CALCULATED.3IONS-SCNC: 11 MMOL/L (ref 6–17)
ANION GAP SERPL CALCULATED.3IONS-SCNC: 12 MMOL/L (ref 3–14)
ANION GAP SERPL CALCULATED.3IONS-SCNC: 13 MMOL/L (ref 3–14)
ANION GAP SERPL CALCULATED.3IONS-SCNC: 18 MMOL/L (ref 3–14)
ANION GAP SERPL CALCULATED.3IONS-SCNC: 7 MMOL/L (ref 3–14)
ANION GAP SERPL CALCULATED.3IONS-SCNC: 7 MMOL/L (ref 3–14)
ANION GAP SERPL CALCULATED.3IONS-SCNC: 8 MMOL/L (ref 3–14)
ANION GAP SERPL CALCULATED.3IONS-SCNC: 8 MMOL/L (ref 3–14)
ANION GAP SERPL CALCULATED.3IONS-SCNC: 9 MMOL/L (ref 3–14)
ANISOCYTOSIS BLD QL SMEAR: ABNORMAL
AST SERPL W P-5'-P-CCNC: 15 U/L (ref 0–45)
AST SERPL W P-5'-P-CCNC: 15 U/L (ref 0–45)
AST SERPL W P-5'-P-CCNC: 18 U/L (ref 0–45)
BACTERIA SPEC CULT: NO GROWTH
BACTERIA SPEC CULT: NO GROWTH
BASE DEFICIT BLDA-SCNC: 3.1 MMOL/L
BASE DEFICIT BLDV-SCNC: 3.1 MMOL/L
BASE DEFICIT BLDV-SCNC: 4.6 MMOL/L
BASE EXCESS BLDA CALC-SCNC: 2.5 MMOL/L
BASE EXCESS BLDV CALC-SCNC: 5.9 MMOL/L
BASOPHILS # BLD AUTO: 0 10E9/L (ref 0–0.2)
BASOPHILS # BLD AUTO: 0.2 10E9/L (ref 0–0.2)
BASOPHILS NFR BLD AUTO: 0.1 %
BASOPHILS NFR BLD AUTO: 0.4 %
BASOPHILS NFR BLD AUTO: 0.5 %
BASOPHILS NFR BLD AUTO: 0.6 %
BASOPHILS NFR BLD AUTO: 0.6 %
BASOPHILS NFR BLD AUTO: 0.8 %
BASOPHILS NFR BLD AUTO: 1 %
BILIRUB DIRECT SERPL-MCNC: 0.1 MG/DL (ref 0–0.2)
BILIRUB SERPL-MCNC: 0.4 MG/DL (ref 0.2–1.3)
BILIRUB SERPL-MCNC: 0.5 MG/DL (ref 0.2–1.3)
BILIRUB SERPL-MCNC: 0.6 MG/DL (ref 0.2–1.3)
BLD GP AB SCN SERPL QL: NORMAL
BLD PROD TYP BPU: NORMAL
BLD UNIT ID BPU: 0
BLOOD BANK CMNT PATIENT-IMP: NORMAL
BLOOD PRODUCT CODE: NORMAL
BPU ID: NORMAL
BUN SERPL-MCNC: 100 MG/DL (ref 7–30)
BUN SERPL-MCNC: 123 MG/DL (ref 7–30)
BUN SERPL-MCNC: 32 MG/DL (ref 7–30)
BUN SERPL-MCNC: 46 MG/DL (ref 7–30)
BUN SERPL-MCNC: 54 MG/DL (ref 7–30)
BUN SERPL-MCNC: 55 MG/DL (ref 7–30)
BUN SERPL-MCNC: 59 MG/DL (ref 7–30)
BUN SERPL-MCNC: 60 MG/DL (ref 7–30)
BUN SERPL-MCNC: 64 MG/DL (ref 7–30)
BUN SERPL-MCNC: 65 MG/DL (ref 7–30)
BUN SERPL-MCNC: 66 MG/DL (ref 7–30)
BUN SERPL-MCNC: 70 MG/DL (ref 7–30)
BUN SERPL-MCNC: 76 MG/DL (ref 7–30)
BUN SERPL-MCNC: 76 MG/DL (ref 7–30)
BUN SERPL-MCNC: 80 MG/DL (ref 7–30)
BUN SERPL-MCNC: 83 MG/DL (ref 7–30)
BUN SERPL-MCNC: 83 MG/DL (ref 7–30)
BUN SERPL-MCNC: 87 MG/DL (ref 7–30)
BUN SERPL-MCNC: 89 MG/DL (ref 7–30)
BUN SERPL-MCNC: 91 MG/DL (ref 7–30)
BUN SERPL-MCNC: 92 MG/DL (ref 7–30)
BUN SERPL-MCNC: 96 MG/DL (ref 7–30)
BUN SERPL-MCNC: 96 MG/DL (ref 7–30)
BUN SERPL-MCNC: 97 MG/DL (ref 7–30)
CA-I BLD-SCNC: 4.9 MG/DL (ref 4.4–5.2)
CALCIUM SERPL-MCNC: 10 MG/DL (ref 8.5–10.1)
CALCIUM SERPL-MCNC: 10.2 MG/DL (ref 8.5–10.1)
CALCIUM SERPL-MCNC: 10.3 MG/DL (ref 8.5–10.1)
CALCIUM SERPL-MCNC: 9.2 MG/DL (ref 8.5–10.1)
CALCIUM SERPL-MCNC: 9.2 MG/DL (ref 8.5–10.1)
CALCIUM SERPL-MCNC: 9.4 MG/DL (ref 8.5–10.1)
CALCIUM SERPL-MCNC: 9.5 MG/DL (ref 8.5–10.1)
CALCIUM SERPL-MCNC: 9.6 MG/DL (ref 8.5–10.1)
CALCIUM SERPL-MCNC: 9.7 MG/DL (ref 8.5–10.1)
CALCIUM SERPL-MCNC: 9.7 MG/DL (ref 8.5–10.1)
CALCIUM SERPL-MCNC: 9.8 MG/DL (ref 8.5–10.1)
CALCIUM SERPL-MCNC: 9.8 MG/DL (ref 8.5–10.1)
CHLORIDE BLD-SCNC: 95 MMOL/L (ref 94–109)
CHLORIDE SERPL-SCNC: 100 MMOL/L (ref 94–109)
CHLORIDE SERPL-SCNC: 89 MMOL/L (ref 94–109)
CHLORIDE SERPL-SCNC: 90 MMOL/L (ref 94–109)
CHLORIDE SERPL-SCNC: 91 MMOL/L (ref 94–109)
CHLORIDE SERPL-SCNC: 92 MMOL/L (ref 94–109)
CHLORIDE SERPL-SCNC: 93 MMOL/L (ref 94–109)
CHLORIDE SERPL-SCNC: 94 MMOL/L (ref 94–109)
CHLORIDE SERPL-SCNC: 95 MMOL/L (ref 94–109)
CHLORIDE SERPL-SCNC: 96 MMOL/L (ref 94–109)
CHLORIDE SERPL-SCNC: 96 MMOL/L (ref 94–109)
CHLORIDE SERPL-SCNC: 97 MMOL/L (ref 94–109)
CHLORIDE SERPL-SCNC: 98 MMOL/L (ref 94–109)
CHLORIDE SERPL-SCNC: 99 MMOL/L (ref 94–109)
CHLORIDE SERPL-SCNC: 99 MMOL/L (ref 94–109)
CO2 BLD-SCNC: 25 MMOL/L (ref 20–32)
CO2 BLDCOV-SCNC: 22 MMOL/L (ref 21–28)
CO2 BLDCOV-SCNC: 23 MMOL/L (ref 21–28)
CO2 SERPL-SCNC: 22 MMOL/L (ref 20–32)
CO2 SERPL-SCNC: 24 MMOL/L (ref 20–32)
CO2 SERPL-SCNC: 24 MMOL/L (ref 20–32)
CO2 SERPL-SCNC: 25 MMOL/L (ref 20–32)
CO2 SERPL-SCNC: 25 MMOL/L (ref 20–32)
CO2 SERPL-SCNC: 26 MMOL/L (ref 20–32)
CO2 SERPL-SCNC: 27 MMOL/L (ref 20–32)
CO2 SERPL-SCNC: 28 MMOL/L (ref 20–32)
CO2 SERPL-SCNC: 29 MMOL/L (ref 20–32)
CO2 SERPL-SCNC: 29 MMOL/L (ref 20–32)
CO2 SERPL-SCNC: 30 MMOL/L (ref 20–32)
CREAT BLD-MCNC: 10.5 MG/DL (ref 0.66–1.25)
CREAT SERPL-MCNC: 10.3 MG/DL (ref 0.66–1.25)
CREAT SERPL-MCNC: 10.7 MG/DL (ref 0.66–1.25)
CREAT SERPL-MCNC: 10.9 MG/DL (ref 0.66–1.25)
CREAT SERPL-MCNC: 12.1 MG/DL (ref 0.66–1.25)
CREAT SERPL-MCNC: 4.73 MG/DL (ref 0.66–1.25)
CREAT SERPL-MCNC: 6.08 MG/DL (ref 0.66–1.25)
CREAT SERPL-MCNC: 6.23 MG/DL (ref 0.66–1.25)
CREAT SERPL-MCNC: 6.94 MG/DL (ref 0.66–1.25)
CREAT SERPL-MCNC: 7.41 MG/DL (ref 0.66–1.25)
CREAT SERPL-MCNC: 7.69 MG/DL (ref 0.66–1.25)
CREAT SERPL-MCNC: 7.88 MG/DL (ref 0.66–1.25)
CREAT SERPL-MCNC: 8.01 MG/DL (ref 0.66–1.25)
CREAT SERPL-MCNC: 8.34 MG/DL (ref 0.66–1.25)
CREAT SERPL-MCNC: 8.55 MG/DL (ref 0.66–1.25)
CREAT SERPL-MCNC: 8.69 MG/DL (ref 0.66–1.25)
CREAT SERPL-MCNC: 8.79 MG/DL (ref 0.66–1.25)
CREAT SERPL-MCNC: 9.07 MG/DL (ref 0.66–1.25)
CREAT SERPL-MCNC: 9.13 MG/DL (ref 0.66–1.25)
CREAT SERPL-MCNC: 9.26 MG/DL (ref 0.66–1.25)
CREAT SERPL-MCNC: 9.31 MG/DL (ref 0.66–1.25)
CREAT SERPL-MCNC: 9.49 MG/DL (ref 0.66–1.25)
CREAT SERPL-MCNC: 9.49 MG/DL (ref 0.66–1.25)
CREAT SERPL-MCNC: 9.6 MG/DL (ref 0.66–1.25)
D DIMER PPP FEU-MCNC: 1.1 UG/ML FEU (ref 0–0.5)
DIFFERENTIAL METHOD BLD: ABNORMAL
EOSINOPHIL # BLD AUTO: 0 10E9/L (ref 0–0.7)
EOSINOPHIL # BLD AUTO: 0 10E9/L (ref 0–0.7)
EOSINOPHIL # BLD AUTO: 0.1 10E9/L (ref 0–0.7)
EOSINOPHIL NFR BLD AUTO: 0 %
EOSINOPHIL NFR BLD AUTO: 0.7 %
EOSINOPHIL NFR BLD AUTO: 1 %
EOSINOPHIL NFR BLD AUTO: 1.1 %
EOSINOPHIL NFR BLD AUTO: 1.2 %
EOSINOPHIL NFR BLD AUTO: 1.3 %
EOSINOPHIL NFR BLD AUTO: 1.4 %
EOSINOPHIL NFR BLD AUTO: 1.6 %
EOSINOPHIL NFR BLD AUTO: 1.9 %
EOSINOPHIL NFR BLD AUTO: 2.2 %
ERYTHROCYTE [DISTWIDTH] IN BLOOD BY AUTOMATED COUNT: 14.5 % (ref 10–15)
ERYTHROCYTE [DISTWIDTH] IN BLOOD BY AUTOMATED COUNT: 14.6 % (ref 10–15)
ERYTHROCYTE [DISTWIDTH] IN BLOOD BY AUTOMATED COUNT: 14.7 % (ref 10–15)
ERYTHROCYTE [DISTWIDTH] IN BLOOD BY AUTOMATED COUNT: 14.8 % (ref 10–15)
ERYTHROCYTE [DISTWIDTH] IN BLOOD BY AUTOMATED COUNT: 14.8 % (ref 10–15)
ERYTHROCYTE [DISTWIDTH] IN BLOOD BY AUTOMATED COUNT: 14.9 % (ref 10–15)
ERYTHROCYTE [DISTWIDTH] IN BLOOD BY AUTOMATED COUNT: 15.1 % (ref 10–15)
ERYTHROCYTE [DISTWIDTH] IN BLOOD BY AUTOMATED COUNT: 15.3 % (ref 10–15)
ERYTHROCYTE [DISTWIDTH] IN BLOOD BY AUTOMATED COUNT: 15.3 % (ref 10–15)
ERYTHROCYTE [DISTWIDTH] IN BLOOD BY AUTOMATED COUNT: 15.8 % (ref 10–15)
ERYTHROCYTE [DISTWIDTH] IN BLOOD BY AUTOMATED COUNT: 16 % (ref 10–15)
ERYTHROCYTE [DISTWIDTH] IN BLOOD BY AUTOMATED COUNT: 16.3 % (ref 10–15)
ERYTHROCYTE [DISTWIDTH] IN BLOOD BY AUTOMATED COUNT: 16.5 % (ref 10–15)
ERYTHROCYTE [DISTWIDTH] IN BLOOD BY AUTOMATED COUNT: 16.8 % (ref 10–15)
ERYTHROCYTE [DISTWIDTH] IN BLOOD BY AUTOMATED COUNT: 16.8 % (ref 10–15)
ERYTHROCYTE [DISTWIDTH] IN BLOOD BY AUTOMATED COUNT: 16.9 % (ref 10–15)
ERYTHROCYTE [DISTWIDTH] IN BLOOD BY AUTOMATED COUNT: 17.1 % (ref 10–15)
ERYTHROCYTE [DISTWIDTH] IN BLOOD BY AUTOMATED COUNT: 18.7 % (ref 10–15)
FERRITIN SERPL-MCNC: 1667 NG/ML (ref 26–388)
GFR SERPL CREATININE-BSD FRML MDRD: 11 ML/MIN/{1.73_M2}
GFR SERPL CREATININE-BSD FRML MDRD: 4 ML/MIN/{1.73_M2}
GFR SERPL CREATININE-BSD FRML MDRD: 5 ML/MIN/{1.73_M2}
GFR SERPL CREATININE-BSD FRML MDRD: 6 ML/MIN/{1.73_M2}
GFR SERPL CREATININE-BSD FRML MDRD: 7 ML/MIN/{1.73_M2}
GFR SERPL CREATININE-BSD FRML MDRD: 7 ML/MIN/{1.73_M2}
GFR SERPL CREATININE-BSD FRML MDRD: 8 ML/MIN/{1.73_M2}
GFR SERPL CREATININE-BSD FRML MDRD: 8 ML/MIN/{1.73_M2}
GLUCOSE BLD-MCNC: 346 MG/DL (ref 70–99)
GLUCOSE BLDC GLUCOMTR-MCNC: 101 MG/DL (ref 70–99)
GLUCOSE BLDC GLUCOMTR-MCNC: 101 MG/DL (ref 70–99)
GLUCOSE BLDC GLUCOMTR-MCNC: 102 MG/DL (ref 70–99)
GLUCOSE BLDC GLUCOMTR-MCNC: 103 MG/DL (ref 70–99)
GLUCOSE BLDC GLUCOMTR-MCNC: 104 MG/DL (ref 70–99)
GLUCOSE BLDC GLUCOMTR-MCNC: 105 MG/DL (ref 70–99)
GLUCOSE BLDC GLUCOMTR-MCNC: 106 MG/DL (ref 70–99)
GLUCOSE BLDC GLUCOMTR-MCNC: 106 MG/DL (ref 70–99)
GLUCOSE BLDC GLUCOMTR-MCNC: 107 MG/DL (ref 70–99)
GLUCOSE BLDC GLUCOMTR-MCNC: 108 MG/DL (ref 70–99)
GLUCOSE BLDC GLUCOMTR-MCNC: 108 MG/DL (ref 70–99)
GLUCOSE BLDC GLUCOMTR-MCNC: 109 MG/DL (ref 70–99)
GLUCOSE BLDC GLUCOMTR-MCNC: 111 MG/DL (ref 70–99)
GLUCOSE BLDC GLUCOMTR-MCNC: 112 MG/DL (ref 70–99)
GLUCOSE BLDC GLUCOMTR-MCNC: 113 MG/DL (ref 70–99)
GLUCOSE BLDC GLUCOMTR-MCNC: 113 MG/DL (ref 70–99)
GLUCOSE BLDC GLUCOMTR-MCNC: 114 MG/DL (ref 70–99)
GLUCOSE BLDC GLUCOMTR-MCNC: 115 MG/DL (ref 70–99)
GLUCOSE BLDC GLUCOMTR-MCNC: 117 MG/DL (ref 70–99)
GLUCOSE BLDC GLUCOMTR-MCNC: 118 MG/DL (ref 70–99)
GLUCOSE BLDC GLUCOMTR-MCNC: 119 MG/DL (ref 70–99)
GLUCOSE BLDC GLUCOMTR-MCNC: 119 MG/DL (ref 70–99)
GLUCOSE BLDC GLUCOMTR-MCNC: 120 MG/DL (ref 70–99)
GLUCOSE BLDC GLUCOMTR-MCNC: 123 MG/DL (ref 70–99)
GLUCOSE BLDC GLUCOMTR-MCNC: 129 MG/DL (ref 70–99)
GLUCOSE BLDC GLUCOMTR-MCNC: 130 MG/DL (ref 70–99)
GLUCOSE BLDC GLUCOMTR-MCNC: 130 MG/DL (ref 70–99)
GLUCOSE BLDC GLUCOMTR-MCNC: 131 MG/DL (ref 70–99)
GLUCOSE BLDC GLUCOMTR-MCNC: 131 MG/DL (ref 70–99)
GLUCOSE BLDC GLUCOMTR-MCNC: 133 MG/DL (ref 70–99)
GLUCOSE BLDC GLUCOMTR-MCNC: 133 MG/DL (ref 70–99)
GLUCOSE BLDC GLUCOMTR-MCNC: 134 MG/DL (ref 70–99)
GLUCOSE BLDC GLUCOMTR-MCNC: 135 MG/DL (ref 70–99)
GLUCOSE BLDC GLUCOMTR-MCNC: 136 MG/DL (ref 70–99)
GLUCOSE BLDC GLUCOMTR-MCNC: 137 MG/DL (ref 70–99)
GLUCOSE BLDC GLUCOMTR-MCNC: 137 MG/DL (ref 70–99)
GLUCOSE BLDC GLUCOMTR-MCNC: 138 MG/DL (ref 70–99)
GLUCOSE BLDC GLUCOMTR-MCNC: 139 MG/DL (ref 70–99)
GLUCOSE BLDC GLUCOMTR-MCNC: 139 MG/DL (ref 70–99)
GLUCOSE BLDC GLUCOMTR-MCNC: 141 MG/DL (ref 70–99)
GLUCOSE BLDC GLUCOMTR-MCNC: 142 MG/DL (ref 70–99)
GLUCOSE BLDC GLUCOMTR-MCNC: 142 MG/DL (ref 70–99)
GLUCOSE BLDC GLUCOMTR-MCNC: 147 MG/DL (ref 70–99)
GLUCOSE BLDC GLUCOMTR-MCNC: 147 MG/DL (ref 70–99)
GLUCOSE BLDC GLUCOMTR-MCNC: 149 MG/DL (ref 70–99)
GLUCOSE BLDC GLUCOMTR-MCNC: 149 MG/DL (ref 70–99)
GLUCOSE BLDC GLUCOMTR-MCNC: 150 MG/DL (ref 70–99)
GLUCOSE BLDC GLUCOMTR-MCNC: 151 MG/DL (ref 70–99)
GLUCOSE BLDC GLUCOMTR-MCNC: 152 MG/DL (ref 70–99)
GLUCOSE BLDC GLUCOMTR-MCNC: 154 MG/DL (ref 70–99)
GLUCOSE BLDC GLUCOMTR-MCNC: 154 MG/DL (ref 70–99)
GLUCOSE BLDC GLUCOMTR-MCNC: 155 MG/DL (ref 70–99)
GLUCOSE BLDC GLUCOMTR-MCNC: 156 MG/DL (ref 70–99)
GLUCOSE BLDC GLUCOMTR-MCNC: 156 MG/DL (ref 70–99)
GLUCOSE BLDC GLUCOMTR-MCNC: 159 MG/DL (ref 70–99)
GLUCOSE BLDC GLUCOMTR-MCNC: 160 MG/DL (ref 70–99)
GLUCOSE BLDC GLUCOMTR-MCNC: 160 MG/DL (ref 70–99)
GLUCOSE BLDC GLUCOMTR-MCNC: 161 MG/DL (ref 70–99)
GLUCOSE BLDC GLUCOMTR-MCNC: 162 MG/DL (ref 70–99)
GLUCOSE BLDC GLUCOMTR-MCNC: 164 MG/DL (ref 70–99)
GLUCOSE BLDC GLUCOMTR-MCNC: 164 MG/DL (ref 70–99)
GLUCOSE BLDC GLUCOMTR-MCNC: 165 MG/DL (ref 70–99)
GLUCOSE BLDC GLUCOMTR-MCNC: 168 MG/DL (ref 70–99)
GLUCOSE BLDC GLUCOMTR-MCNC: 170 MG/DL (ref 70–99)
GLUCOSE BLDC GLUCOMTR-MCNC: 171 MG/DL (ref 70–99)
GLUCOSE BLDC GLUCOMTR-MCNC: 172 MG/DL (ref 70–99)
GLUCOSE BLDC GLUCOMTR-MCNC: 173 MG/DL (ref 70–99)
GLUCOSE BLDC GLUCOMTR-MCNC: 177 MG/DL (ref 70–99)
GLUCOSE BLDC GLUCOMTR-MCNC: 177 MG/DL (ref 70–99)
GLUCOSE BLDC GLUCOMTR-MCNC: 178 MG/DL (ref 70–99)
GLUCOSE BLDC GLUCOMTR-MCNC: 178 MG/DL (ref 70–99)
GLUCOSE BLDC GLUCOMTR-MCNC: 185 MG/DL (ref 70–99)
GLUCOSE BLDC GLUCOMTR-MCNC: 186 MG/DL (ref 70–99)
GLUCOSE BLDC GLUCOMTR-MCNC: 186 MG/DL (ref 70–99)
GLUCOSE BLDC GLUCOMTR-MCNC: 189 MG/DL (ref 70–99)
GLUCOSE BLDC GLUCOMTR-MCNC: 198 MG/DL (ref 70–99)
GLUCOSE BLDC GLUCOMTR-MCNC: 199 MG/DL (ref 70–99)
GLUCOSE BLDC GLUCOMTR-MCNC: 207 MG/DL (ref 70–99)
GLUCOSE BLDC GLUCOMTR-MCNC: 208 MG/DL (ref 70–99)
GLUCOSE BLDC GLUCOMTR-MCNC: 211 MG/DL (ref 70–99)
GLUCOSE BLDC GLUCOMTR-MCNC: 212 MG/DL (ref 70–99)
GLUCOSE BLDC GLUCOMTR-MCNC: 213 MG/DL (ref 70–99)
GLUCOSE BLDC GLUCOMTR-MCNC: 213 MG/DL (ref 70–99)
GLUCOSE BLDC GLUCOMTR-MCNC: 216 MG/DL (ref 70–99)
GLUCOSE BLDC GLUCOMTR-MCNC: 228 MG/DL (ref 70–99)
GLUCOSE BLDC GLUCOMTR-MCNC: 231 MG/DL (ref 70–99)
GLUCOSE BLDC GLUCOMTR-MCNC: 232 MG/DL (ref 70–99)
GLUCOSE BLDC GLUCOMTR-MCNC: 238 MG/DL (ref 70–99)
GLUCOSE BLDC GLUCOMTR-MCNC: 242 MG/DL (ref 70–99)
GLUCOSE BLDC GLUCOMTR-MCNC: 249 MG/DL (ref 70–99)
GLUCOSE BLDC GLUCOMTR-MCNC: 251 MG/DL (ref 70–99)
GLUCOSE BLDC GLUCOMTR-MCNC: 296 MG/DL (ref 70–99)
GLUCOSE BLDC GLUCOMTR-MCNC: 63 MG/DL (ref 70–99)
GLUCOSE BLDC GLUCOMTR-MCNC: 67 MG/DL (ref 70–99)
GLUCOSE BLDC GLUCOMTR-MCNC: 72 MG/DL (ref 70–99)
GLUCOSE BLDC GLUCOMTR-MCNC: 76 MG/DL (ref 70–99)
GLUCOSE BLDC GLUCOMTR-MCNC: 81 MG/DL (ref 70–99)
GLUCOSE BLDC GLUCOMTR-MCNC: 85 MG/DL (ref 70–99)
GLUCOSE BLDC GLUCOMTR-MCNC: 85 MG/DL (ref 70–99)
GLUCOSE BLDC GLUCOMTR-MCNC: 86 MG/DL (ref 70–99)
GLUCOSE BLDC GLUCOMTR-MCNC: 87 MG/DL (ref 70–99)
GLUCOSE BLDC GLUCOMTR-MCNC: 88 MG/DL (ref 70–99)
GLUCOSE BLDC GLUCOMTR-MCNC: 92 MG/DL (ref 70–99)
GLUCOSE BLDC GLUCOMTR-MCNC: 93 MG/DL (ref 70–99)
GLUCOSE BLDC GLUCOMTR-MCNC: 95 MG/DL (ref 70–99)
GLUCOSE BLDC GLUCOMTR-MCNC: 96 MG/DL (ref 70–99)
GLUCOSE BLDC GLUCOMTR-MCNC: 96 MG/DL (ref 70–99)
GLUCOSE BLDC GLUCOMTR-MCNC: 97 MG/DL (ref 70–99)
GLUCOSE BLDC GLUCOMTR-MCNC: 98 MG/DL (ref 70–99)
GLUCOSE BLDC GLUCOMTR-MCNC: 98 MG/DL (ref 70–99)
GLUCOSE SERPL-MCNC: 103 MG/DL (ref 70–99)
GLUCOSE SERPL-MCNC: 104 MG/DL (ref 70–99)
GLUCOSE SERPL-MCNC: 105 MG/DL (ref 70–99)
GLUCOSE SERPL-MCNC: 110 MG/DL (ref 70–99)
GLUCOSE SERPL-MCNC: 116 MG/DL (ref 70–99)
GLUCOSE SERPL-MCNC: 116 MG/DL (ref 70–99)
GLUCOSE SERPL-MCNC: 128 MG/DL (ref 70–99)
GLUCOSE SERPL-MCNC: 136 MG/DL (ref 70–99)
GLUCOSE SERPL-MCNC: 149 MG/DL (ref 70–99)
GLUCOSE SERPL-MCNC: 156 MG/DL (ref 70–99)
GLUCOSE SERPL-MCNC: 157 MG/DL (ref 70–99)
GLUCOSE SERPL-MCNC: 158 MG/DL (ref 70–99)
GLUCOSE SERPL-MCNC: 162 MG/DL (ref 70–99)
GLUCOSE SERPL-MCNC: 174 MG/DL (ref 70–99)
GLUCOSE SERPL-MCNC: 182 MG/DL (ref 70–99)
GLUCOSE SERPL-MCNC: 204 MG/DL (ref 70–99)
GLUCOSE SERPL-MCNC: 217 MG/DL (ref 70–99)
GLUCOSE SERPL-MCNC: 225 MG/DL (ref 70–99)
GLUCOSE SERPL-MCNC: 279 MG/DL (ref 70–99)
GLUCOSE SERPL-MCNC: 280 MG/DL (ref 70–99)
GLUCOSE SERPL-MCNC: 280 MG/DL (ref 70–99)
GLUCOSE SERPL-MCNC: 348 MG/DL (ref 70–99)
GLUCOSE SERPL-MCNC: 88 MG/DL (ref 70–99)
HBA1C MFR BLD: 5.3 % (ref 0–5.6)
HBA1C MFR BLD: NORMAL % (ref 0–5.6)
HCO3 BLD-SCNC: 23 MMOL/L (ref 21–28)
HCO3 BLD-SCNC: 28 MMOL/L (ref 21–28)
HCO3 BLDV-SCNC: 22 MMOL/L (ref 21–28)
HCO3 BLDV-SCNC: 24 MMOL/L (ref 21–28)
HCO3 BLDV-SCNC: 31 MMOL/L (ref 21–28)
HCT VFR BLD AUTO: 20.5 % (ref 40–53)
HCT VFR BLD AUTO: 21.1 % (ref 40–53)
HCT VFR BLD AUTO: 21.5 % (ref 40–53)
HCT VFR BLD AUTO: 24.3 % (ref 40–53)
HCT VFR BLD AUTO: 26.3 % (ref 40–53)
HCT VFR BLD AUTO: 26.5 % (ref 40–53)
HCT VFR BLD AUTO: 26.6 % (ref 40–53)
HCT VFR BLD AUTO: 26.8 % (ref 40–53)
HCT VFR BLD AUTO: 26.9 % (ref 40–53)
HCT VFR BLD AUTO: 27.7 % (ref 40–53)
HCT VFR BLD AUTO: 28 % (ref 40–53)
HCT VFR BLD AUTO: 28.2 % (ref 40–53)
HCT VFR BLD AUTO: 28.5 % (ref 40–53)
HCT VFR BLD AUTO: 28.7 % (ref 40–53)
HCT VFR BLD AUTO: 29.3 % (ref 40–53)
HCT VFR BLD AUTO: 31 % (ref 40–53)
HCT VFR BLD AUTO: 31.2 % (ref 40–53)
HCT VFR BLD AUTO: 31.4 % (ref 40–53)
HCT VFR BLD AUTO: 31.9 % (ref 40–53)
HCT VFR BLD AUTO: 34.1 % (ref 40–53)
HCT VFR BLD CALC: 34 %PCV (ref 40–53)
HEMOCCULT STL QL: POSITIVE
HGB BLD CALC-MCNC: 11.6 G/DL (ref 13.3–17.7)
HGB BLD-MCNC: 10.3 G/DL (ref 13.3–17.7)
HGB BLD-MCNC: 6.3 G/DL (ref 13.3–17.7)
HGB BLD-MCNC: 6.6 G/DL (ref 13.3–17.7)
HGB BLD-MCNC: 6.7 G/DL (ref 13.3–17.7)
HGB BLD-MCNC: 7.8 G/DL (ref 13.3–17.7)
HGB BLD-MCNC: 8 G/DL (ref 13.3–17.7)
HGB BLD-MCNC: 8 G/DL (ref 13.3–17.7)
HGB BLD-MCNC: 8.1 G/DL (ref 13.3–17.7)
HGB BLD-MCNC: 8.1 G/DL (ref 13.3–17.7)
HGB BLD-MCNC: 8.2 G/DL (ref 13.3–17.7)
HGB BLD-MCNC: 8.4 G/DL (ref 13.3–17.7)
HGB BLD-MCNC: 8.5 G/DL (ref 13.3–17.7)
HGB BLD-MCNC: 8.5 G/DL (ref 13.3–17.7)
HGB BLD-MCNC: 8.6 G/DL (ref 13.3–17.7)
HGB BLD-MCNC: 8.9 G/DL (ref 13.3–17.7)
HGB BLD-MCNC: 9 G/DL (ref 13.3–17.7)
HGB BLD-MCNC: 9.2 G/DL (ref 13.3–17.7)
HGB BLD-MCNC: 9.3 G/DL (ref 13.3–17.7)
HGB BLD-MCNC: 9.7 G/DL (ref 13.3–17.7)
HGB BLD-MCNC: 9.7 G/DL (ref 13.3–17.7)
IMM GRANULOCYTES # BLD: 0 10E9/L (ref 0–0.4)
IMM GRANULOCYTES # BLD: 0.1 10E9/L (ref 0–0.4)
IMM GRANULOCYTES NFR BLD: 0.1 %
IMM GRANULOCYTES NFR BLD: 0.4 %
IMM GRANULOCYTES NFR BLD: 0.4 %
IMM GRANULOCYTES NFR BLD: 0.5 %
IMM GRANULOCYTES NFR BLD: 0.5 %
IMM GRANULOCYTES NFR BLD: 0.6 %
IMM GRANULOCYTES NFR BLD: 1.6 %
INR PPP: 1.1 (ref 0.86–1.14)
INTERPRETATION ECG - MUSE: NORMAL
IRON SATN MFR SERPL: 47 % (ref 15–46)
IRON SERPL-MCNC: 81 UG/DL (ref 35–180)
LACTATE BLD-SCNC: 1 MMOL/L (ref 0.7–2.1)
LACTATE BLD-SCNC: 1.3 MMOL/L (ref 0.7–2)
LACTATE BLD-SCNC: 5.5 MMOL/L (ref 0.7–2.1)
LIPASE SERPL-CCNC: 168 U/L (ref 73–393)
LMWH PPP CHRO-ACNC: <0.1 IU/ML
LYMPHOCYTES # BLD AUTO: 0.6 10E9/L (ref 0.8–5.3)
LYMPHOCYTES # BLD AUTO: 0.7 10E9/L (ref 0.8–5.3)
LYMPHOCYTES # BLD AUTO: 0.8 10E9/L (ref 0.8–5.3)
LYMPHOCYTES # BLD AUTO: 0.8 10E9/L (ref 0.8–5.3)
LYMPHOCYTES # BLD AUTO: 0.9 10E9/L (ref 0.8–5.3)
LYMPHOCYTES # BLD AUTO: 1 10E9/L (ref 0.8–5.3)
LYMPHOCYTES # BLD AUTO: 1 10E9/L (ref 0.8–5.3)
LYMPHOCYTES # BLD AUTO: 4.1 10E9/L (ref 0.8–5.3)
LYMPHOCYTES NFR BLD AUTO: 10.3 %
LYMPHOCYTES NFR BLD AUTO: 13.3 %
LYMPHOCYTES NFR BLD AUTO: 13.4 %
LYMPHOCYTES NFR BLD AUTO: 14.5 %
LYMPHOCYTES NFR BLD AUTO: 14.9 %
LYMPHOCYTES NFR BLD AUTO: 17.5 %
LYMPHOCYTES NFR BLD AUTO: 17.7 %
LYMPHOCYTES NFR BLD AUTO: 18 %
LYMPHOCYTES NFR BLD AUTO: 18.8 %
LYMPHOCYTES NFR BLD AUTO: 8 %
Lab: NORMAL
Lab: NORMAL
MACROCYTES BLD QL SMEAR: PRESENT
MAGNESIUM SERPL-MCNC: 2.2 MG/DL (ref 1.6–2.3)
MAGNESIUM SERPL-MCNC: 2.3 MG/DL (ref 1.6–2.3)
MAGNESIUM SERPL-MCNC: 2.3 MG/DL (ref 1.6–2.3)
MAGNESIUM SERPL-MCNC: 2.7 MG/DL (ref 1.6–2.3)
MAGNESIUM SERPL-MCNC: 2.9 MG/DL (ref 1.6–2.3)
MCH RBC QN AUTO: 27.2 PG (ref 26.5–33)
MCH RBC QN AUTO: 27.3 PG (ref 26.5–33)
MCH RBC QN AUTO: 27.4 PG (ref 26.5–33)
MCH RBC QN AUTO: 27.7 PG (ref 26.5–33)
MCH RBC QN AUTO: 27.9 PG (ref 26.5–33)
MCH RBC QN AUTO: 28 PG (ref 26.5–33)
MCH RBC QN AUTO: 28.5 PG (ref 26.5–33)
MCH RBC QN AUTO: 28.9 PG (ref 26.5–33)
MCH RBC QN AUTO: 29.8 PG (ref 26.5–33)
MCH RBC QN AUTO: 30 PG (ref 26.5–33)
MCH RBC QN AUTO: 30.2 PG (ref 26.5–33)
MCH RBC QN AUTO: 30.4 PG (ref 26.5–33)
MCH RBC QN AUTO: 31 PG (ref 26.5–33)
MCH RBC QN AUTO: 31 PG (ref 26.5–33)
MCH RBC QN AUTO: 31.1 PG (ref 26.5–33)
MCH RBC QN AUTO: 32.5 PG (ref 26.5–33)
MCHC RBC AUTO-ENTMCNC: 29 G/DL (ref 31.5–36.5)
MCHC RBC AUTO-ENTMCNC: 29.2 G/DL (ref 31.5–36.5)
MCHC RBC AUTO-ENTMCNC: 29.2 G/DL (ref 31.5–36.5)
MCHC RBC AUTO-ENTMCNC: 29.3 G/DL (ref 31.5–36.5)
MCHC RBC AUTO-ENTMCNC: 29.8 G/DL (ref 31.5–36.5)
MCHC RBC AUTO-ENTMCNC: 30.1 G/DL (ref 31.5–36.5)
MCHC RBC AUTO-ENTMCNC: 30.2 G/DL (ref 31.5–36.5)
MCHC RBC AUTO-ENTMCNC: 30.4 G/DL (ref 31.5–36.5)
MCHC RBC AUTO-ENTMCNC: 30.7 G/DL (ref 31.5–36.5)
MCHC RBC AUTO-ENTMCNC: 30.7 G/DL (ref 31.5–36.5)
MCHC RBC AUTO-ENTMCNC: 30.9 G/DL (ref 31.5–36.5)
MCHC RBC AUTO-ENTMCNC: 30.9 G/DL (ref 31.5–36.5)
MCHC RBC AUTO-ENTMCNC: 31.1 G/DL (ref 31.5–36.5)
MCHC RBC AUTO-ENTMCNC: 31.2 G/DL (ref 31.5–36.5)
MCHC RBC AUTO-ENTMCNC: 31.6 G/DL (ref 31.5–36.5)
MCHC RBC AUTO-ENTMCNC: 31.7 G/DL (ref 31.5–36.5)
MCHC RBC AUTO-ENTMCNC: 31.8 G/DL (ref 31.5–36.5)
MCHC RBC AUTO-ENTMCNC: 31.9 G/DL (ref 31.5–36.5)
MCHC RBC AUTO-ENTMCNC: 32.1 G/DL (ref 31.5–36.5)
MCHC RBC AUTO-ENTMCNC: 32.1 G/DL (ref 31.5–36.5)
MCV RBC AUTO: 100 FL (ref 78–100)
MCV RBC AUTO: 101 FL (ref 78–100)
MCV RBC AUTO: 90 FL (ref 78–100)
MCV RBC AUTO: 92 FL (ref 78–100)
MCV RBC AUTO: 93 FL (ref 78–100)
MCV RBC AUTO: 94 FL (ref 78–100)
MCV RBC AUTO: 95 FL (ref 78–100)
MCV RBC AUTO: 96 FL (ref 78–100)
MDC_IDC_EPISODE_DTM: NORMAL
MDC_IDC_EPISODE_ID: NORMAL
MDC_IDC_EPISODE_TYPE: NORMAL
MDC_IDC_LEAD_IMPLANT_DT: NORMAL
MDC_IDC_LEAD_LOCATION: NORMAL
MDC_IDC_LEAD_LOCATION_DETAIL_1: NORMAL
MDC_IDC_LEAD_MFG: NORMAL
MDC_IDC_LEAD_MODEL: NORMAL
MDC_IDC_LEAD_POLARITY_TYPE: NORMAL
MDC_IDC_LEAD_SERIAL: NORMAL
MDC_IDC_MSMT_BATTERY_DTM: NORMAL
MDC_IDC_MSMT_BATTERY_REMAINING_LONGEVITY: 168 MO
MDC_IDC_MSMT_BATTERY_REMAINING_LONGEVITY: 174 MO
MDC_IDC_MSMT_BATTERY_REMAINING_PERCENTAGE: 100 %
MDC_IDC_MSMT_BATTERY_STATUS: NORMAL
MDC_IDC_MSMT_LEADCHNL_RA_IMPEDANCE_VALUE: 580 OHM
MDC_IDC_MSMT_LEADCHNL_RA_IMPEDANCE_VALUE: 590 OHM
MDC_IDC_MSMT_LEADCHNL_RA_IMPEDANCE_VALUE: 611 OHM
MDC_IDC_MSMT_LEADCHNL_RA_PACING_THRESHOLD_AMPLITUDE: 0.7 V
MDC_IDC_MSMT_LEADCHNL_RA_PACING_THRESHOLD_AMPLITUDE: 0.8 V
MDC_IDC_MSMT_LEADCHNL_RA_PACING_THRESHOLD_AMPLITUDE: 1 V
MDC_IDC_MSMT_LEADCHNL_RA_PACING_THRESHOLD_PULSEWIDTH: 0.4 MS
MDC_IDC_MSMT_LEADCHNL_RA_SENSING_INTR_AMPL: 4.4 MV
MDC_IDC_MSMT_LEADCHNL_RA_SENSING_INTR_AMPL: 4.8 MV
MDC_IDC_MSMT_LEADCHNL_RV_IMPEDANCE_VALUE: 714 OHM
MDC_IDC_MSMT_LEADCHNL_RV_IMPEDANCE_VALUE: 750 OHM
MDC_IDC_MSMT_LEADCHNL_RV_IMPEDANCE_VALUE: 879 OHM
MDC_IDC_MSMT_LEADCHNL_RV_PACING_THRESHOLD_AMPLITUDE: 0.6 V
MDC_IDC_MSMT_LEADCHNL_RV_PACING_THRESHOLD_PULSEWIDTH: 0.4 MS
MDC_IDC_MSMT_LEADCHNL_RV_SENSING_INTR_AMPL: 17.3 MV
MDC_IDC_MSMT_LEADCHNL_RV_SENSING_INTR_AMPL: 20 MV
MDC_IDC_PG_IMPLANT_DTM: NORMAL
MDC_IDC_PG_MFG: NORMAL
MDC_IDC_PG_MODEL: NORMAL
MDC_IDC_PG_SERIAL: NORMAL
MDC_IDC_PG_TYPE: NORMAL
MDC_IDC_SESS_CLINIC_NAME: NORMAL
MDC_IDC_SESS_DTM: NORMAL
MDC_IDC_SESS_TYPE: NORMAL
MDC_IDC_SET_BRADY_AT_MODE_SWITCH_MODE: NORMAL
MDC_IDC_SET_BRADY_AT_MODE_SWITCH_RATE: 170 {BEATS}/MIN
MDC_IDC_SET_BRADY_HYSTRATE: NORMAL
MDC_IDC_SET_BRADY_HYSTRATE: NORMAL
MDC_IDC_SET_BRADY_LOWRATE: 60 {BEATS}/MIN
MDC_IDC_SET_BRADY_MAX_SENSOR_RATE: 130 {BEATS}/MIN
MDC_IDC_SET_BRADY_MAX_TRACKING_RATE: 130 {BEATS}/MIN
MDC_IDC_SET_BRADY_MODE: NORMAL
MDC_IDC_SET_BRADY_PAV_DELAY_HIGH: 150 MS
MDC_IDC_SET_BRADY_PAV_DELAY_LOW: 200 MS
MDC_IDC_SET_BRADY_SAV_DELAY_HIGH: 150 MS
MDC_IDC_SET_BRADY_SAV_DELAY_LOW: 200 MS
MDC_IDC_SET_LEADCHNL_RA_PACING_AMPLITUDE: 2 V
MDC_IDC_SET_LEADCHNL_RA_PACING_ANODE_ELECTRODE_1: NORMAL
MDC_IDC_SET_LEADCHNL_RA_PACING_ANODE_ELECTRODE_1: NORMAL
MDC_IDC_SET_LEADCHNL_RA_PACING_ANODE_LOCATION_1: NORMAL
MDC_IDC_SET_LEADCHNL_RA_PACING_ANODE_LOCATION_1: NORMAL
MDC_IDC_SET_LEADCHNL_RA_PACING_CAPTURE_MODE: NORMAL
MDC_IDC_SET_LEADCHNL_RA_PACING_CATHODE_ELECTRODE_1: NORMAL
MDC_IDC_SET_LEADCHNL_RA_PACING_CATHODE_ELECTRODE_1: NORMAL
MDC_IDC_SET_LEADCHNL_RA_PACING_CATHODE_LOCATION_1: NORMAL
MDC_IDC_SET_LEADCHNL_RA_PACING_CATHODE_LOCATION_1: NORMAL
MDC_IDC_SET_LEADCHNL_RA_PACING_POLARITY: NORMAL
MDC_IDC_SET_LEADCHNL_RA_PACING_PULSEWIDTH: 0.4 MS
MDC_IDC_SET_LEADCHNL_RA_SENSING_ADAPTATION_MODE: NORMAL
MDC_IDC_SET_LEADCHNL_RA_SENSING_ANODE_ELECTRODE_1: NORMAL
MDC_IDC_SET_LEADCHNL_RA_SENSING_ANODE_ELECTRODE_1: NORMAL
MDC_IDC_SET_LEADCHNL_RA_SENSING_ANODE_LOCATION_1: NORMAL
MDC_IDC_SET_LEADCHNL_RA_SENSING_ANODE_LOCATION_1: NORMAL
MDC_IDC_SET_LEADCHNL_RA_SENSING_CATHODE_ELECTRODE_1: NORMAL
MDC_IDC_SET_LEADCHNL_RA_SENSING_CATHODE_ELECTRODE_1: NORMAL
MDC_IDC_SET_LEADCHNL_RA_SENSING_CATHODE_LOCATION_1: NORMAL
MDC_IDC_SET_LEADCHNL_RA_SENSING_CATHODE_LOCATION_1: NORMAL
MDC_IDC_SET_LEADCHNL_RA_SENSING_POLARITY: NORMAL
MDC_IDC_SET_LEADCHNL_RA_SENSING_SENSITIVITY: 0.25 MV
MDC_IDC_SET_LEADCHNL_RV_PACING_AMPLITUDE: 1.1 V
MDC_IDC_SET_LEADCHNL_RV_PACING_AMPLITUDE: 1.2 V
MDC_IDC_SET_LEADCHNL_RV_PACING_AMPLITUDE: 1.2 V
MDC_IDC_SET_LEADCHNL_RV_PACING_ANODE_ELECTRODE_1: NORMAL
MDC_IDC_SET_LEADCHNL_RV_PACING_ANODE_ELECTRODE_1: NORMAL
MDC_IDC_SET_LEADCHNL_RV_PACING_ANODE_LOCATION_1: NORMAL
MDC_IDC_SET_LEADCHNL_RV_PACING_ANODE_LOCATION_1: NORMAL
MDC_IDC_SET_LEADCHNL_RV_PACING_CAPTURE_MODE: NORMAL
MDC_IDC_SET_LEADCHNL_RV_PACING_CATHODE_ELECTRODE_1: NORMAL
MDC_IDC_SET_LEADCHNL_RV_PACING_CATHODE_ELECTRODE_1: NORMAL
MDC_IDC_SET_LEADCHNL_RV_PACING_CATHODE_LOCATION_1: NORMAL
MDC_IDC_SET_LEADCHNL_RV_PACING_CATHODE_LOCATION_1: NORMAL
MDC_IDC_SET_LEADCHNL_RV_PACING_POLARITY: NORMAL
MDC_IDC_SET_LEADCHNL_RV_PACING_PULSEWIDTH: 0.4 MS
MDC_IDC_SET_LEADCHNL_RV_SENSING_ADAPTATION_MODE: NORMAL
MDC_IDC_SET_LEADCHNL_RV_SENSING_ANODE_ELECTRODE_1: NORMAL
MDC_IDC_SET_LEADCHNL_RV_SENSING_ANODE_ELECTRODE_1: NORMAL
MDC_IDC_SET_LEADCHNL_RV_SENSING_ANODE_LOCATION_1: NORMAL
MDC_IDC_SET_LEADCHNL_RV_SENSING_ANODE_LOCATION_1: NORMAL
MDC_IDC_SET_LEADCHNL_RV_SENSING_CATHODE_ELECTRODE_1: NORMAL
MDC_IDC_SET_LEADCHNL_RV_SENSING_CATHODE_ELECTRODE_1: NORMAL
MDC_IDC_SET_LEADCHNL_RV_SENSING_CATHODE_LOCATION_1: NORMAL
MDC_IDC_SET_LEADCHNL_RV_SENSING_CATHODE_LOCATION_1: NORMAL
MDC_IDC_SET_LEADCHNL_RV_SENSING_POLARITY: NORMAL
MDC_IDC_SET_LEADCHNL_RV_SENSING_SENSITIVITY: 1.5 MV
MDC_IDC_SET_ZONE_DETECTION_INTERVAL: 375 MS
MDC_IDC_SET_ZONE_TYPE: NORMAL
MDC_IDC_SET_ZONE_VENDOR_TYPE: NORMAL
MDC_IDC_STAT_AT_BURDEN_PERCENT: 0 %
MDC_IDC_STAT_AT_DTM_END: NORMAL
MDC_IDC_STAT_AT_DTM_START: NORMAL
MDC_IDC_STAT_BRADY_DTM_END: NORMAL
MDC_IDC_STAT_BRADY_DTM_START: NORMAL
MDC_IDC_STAT_BRADY_RA_PERCENT_PACED: 18 %
MDC_IDC_STAT_BRADY_RA_PERCENT_PACED: 21 %
MDC_IDC_STAT_BRADY_RV_PERCENT_PACED: 0 %
MDC_IDC_STAT_BRADY_RV_PERCENT_PACED: 100 %
MDC_IDC_STAT_EPISODE_RECENT_COUNT: 0
MDC_IDC_STAT_EPISODE_RECENT_COUNT_DTM_END: NORMAL
MDC_IDC_STAT_EPISODE_RECENT_COUNT_DTM_START: NORMAL
MDC_IDC_STAT_EPISODE_TOTAL_COUNT: 0
MDC_IDC_STAT_EPISODE_TOTAL_COUNT: 0
MDC_IDC_STAT_EPISODE_TOTAL_COUNT_DTM_END: NORMAL
MDC_IDC_STAT_EPISODE_TOTAL_COUNT_DTM_END: NORMAL
MDC_IDC_STAT_EPISODE_TYPE: NORMAL
MDC_IDC_STAT_EPISODE_VENDOR_TYPE: NORMAL
MICROCYTES BLD QL SMEAR: PRESENT
MONOCYTES # BLD AUTO: 0.2 10E9/L (ref 0–1.3)
MONOCYTES # BLD AUTO: 0.4 10E9/L (ref 0–1.3)
MONOCYTES # BLD AUTO: 0.5 10E9/L (ref 0–1.3)
MONOCYTES # BLD AUTO: 0.6 10E9/L (ref 0–1.3)
MONOCYTES # BLD AUTO: 0.6 10E9/L (ref 0–1.3)
MONOCYTES # BLD AUTO: 0.9 10E9/L (ref 0–1.3)
MONOCYTES NFR BLD AUTO: 10.9 %
MONOCYTES NFR BLD AUTO: 4 %
MONOCYTES NFR BLD AUTO: 4.9 %
MONOCYTES NFR BLD AUTO: 6.1 %
MONOCYTES NFR BLD AUTO: 6.4 %
MONOCYTES NFR BLD AUTO: 7 %
MONOCYTES NFR BLD AUTO: 7.2 %
MONOCYTES NFR BLD AUTO: 7.5 %
MONOCYTES NFR BLD AUTO: 9.3 %
MONOCYTES NFR BLD AUTO: 9.7 %
MRSA DNA SPEC QL NAA+PROBE: NEGATIVE
NEUTROPHILS # BLD AUTO: 17.3 10E9/L (ref 1.6–8.3)
NEUTROPHILS # BLD AUTO: 3.5 10E9/L (ref 1.6–8.3)
NEUTROPHILS # BLD AUTO: 3.5 10E9/L (ref 1.6–8.3)
NEUTROPHILS # BLD AUTO: 3.6 10E9/L (ref 1.6–8.3)
NEUTROPHILS # BLD AUTO: 3.6 10E9/L (ref 1.6–8.3)
NEUTROPHILS # BLD AUTO: 3.9 10E9/L (ref 1.6–8.3)
NEUTROPHILS # BLD AUTO: 4.4 10E9/L (ref 1.6–8.3)
NEUTROPHILS # BLD AUTO: 4.8 10E9/L (ref 1.6–8.3)
NEUTROPHILS # BLD AUTO: 5.5 10E9/L (ref 1.6–8.3)
NEUTROPHILS # BLD AUTO: 7 10E9/L (ref 1.6–8.3)
NEUTROPHILS NFR BLD AUTO: 69.1 %
NEUTROPHILS NFR BLD AUTO: 69.3 %
NEUTROPHILS NFR BLD AUTO: 72.4 %
NEUTROPHILS NFR BLD AUTO: 73.9 %
NEUTROPHILS NFR BLD AUTO: 75.2 %
NEUTROPHILS NFR BLD AUTO: 76.8 %
NEUTROPHILS NFR BLD AUTO: 77 %
NEUTROPHILS NFR BLD AUTO: 79 %
NEUTROPHILS NFR BLD AUTO: 82.4 %
NEUTROPHILS NFR BLD AUTO: 82.6 %
NRBC # BLD AUTO: 0 10*3/UL
NRBC BLD AUTO-RTO: 0 /100
NT-PROBNP SERPL-MCNC: ABNORMAL PG/ML (ref 0–900)
NUM BPU REQUESTED: 1
NUM BPU REQUESTED: 2
NUM BPU REQUESTED: 2
O2/TOTAL GAS SETTING VFR VENT: 2 %
O2/TOTAL GAS SETTING VFR VENT: 95 %
O2/TOTAL GAS SETTING VFR VENT: ABNORMAL %
O2/TOTAL GAS SETTING VFR VENT: NORMAL %
OXYHGB MFR BLD: 94 % (ref 92–100)
OXYHGB MFR BLDV: 38 %
OXYHGB MFR BLDV: 72 %
OXYHGB MFR BLDV: 75 %
PCO2 BLD: 44 MM HG (ref 35–45)
PCO2 BLD: 44 MM HG (ref 35–45)
PCO2 BLDV: 38 MM HG (ref 40–50)
PCO2 BLDV: 41 MM HG (ref 40–50)
PCO2 BLDV: 48 MM HG (ref 40–50)
PCO2 BLDV: 63 MM HG (ref 40–50)
PCO2 BLDV: 64 MM HG (ref 40–50)
PH BLD: 7.32 PH (ref 7.35–7.45)
PH BLD: 7.41 PH (ref 7.35–7.45)
PH BLDV: 7.17 PH (ref 7.32–7.43)
PH BLDV: 7.19 PH (ref 7.32–7.43)
PH BLDV: 7.35 PH (ref 7.32–7.43)
PH BLDV: 7.36 PH (ref 7.32–7.43)
PH BLDV: 7.42 PH (ref 7.32–7.43)
PHOSPHATE SERPL-MCNC: 4.1 MG/DL (ref 2.5–4.5)
PHOSPHATE SERPL-MCNC: 5.9 MG/DL (ref 2.5–4.5)
PHOSPHATE SERPL-MCNC: 6.6 MG/DL (ref 2.5–4.5)
PHOSPHATE SERPL-MCNC: 6.6 MG/DL (ref 2.5–4.5)
PHOSPHATE SERPL-MCNC: 6.8 MG/DL (ref 2.5–4.5)
PHOSPHATE SERPL-MCNC: 7.1 MG/DL (ref 2.5–4.5)
PHOSPHATE SERPL-MCNC: 7.3 MG/DL (ref 2.5–4.5)
PLATELET # BLD AUTO: 101 10E9/L (ref 150–450)
PLATELET # BLD AUTO: 104 10E9/L (ref 150–450)
PLATELET # BLD AUTO: 117 10E9/L (ref 150–450)
PLATELET # BLD AUTO: 118 10E9/L (ref 150–450)
PLATELET # BLD AUTO: 119 10E9/L (ref 150–450)
PLATELET # BLD AUTO: 120 10E9/L (ref 150–450)
PLATELET # BLD AUTO: 126 10E9/L (ref 150–450)
PLATELET # BLD AUTO: 128 10E9/L (ref 150–450)
PLATELET # BLD AUTO: 131 10E9/L (ref 150–450)
PLATELET # BLD AUTO: 134 10E9/L (ref 150–450)
PLATELET # BLD AUTO: 136 10E9/L (ref 150–450)
PLATELET # BLD AUTO: 142 10E9/L (ref 150–450)
PLATELET # BLD AUTO: 146 10E9/L (ref 150–450)
PLATELET # BLD AUTO: 146 10E9/L (ref 150–450)
PLATELET # BLD AUTO: 148 10E9/L (ref 150–450)
PLATELET # BLD AUTO: 154 10E9/L (ref 150–450)
PLATELET # BLD AUTO: 157 10E9/L (ref 150–450)
PLATELET # BLD AUTO: 160 10E9/L (ref 150–450)
PLATELET # BLD AUTO: 340 10E9/L (ref 150–450)
PLATELET # BLD AUTO: 87 10E9/L (ref 150–450)
PLATELET # BLD AUTO: 89 10E9/L (ref 150–450)
PLATELET # BLD AUTO: 94 10E9/L (ref 150–450)
PLATELET # BLD AUTO: 97 10E9/L (ref 150–450)
PLATELET # BLD EST: ABNORMAL 10*3/UL
PO2 BLD: 161 MM HG (ref 80–105)
PO2 BLD: 85 MM HG (ref 80–105)
PO2 BLDV: 29 MM HG (ref 25–47)
PO2 BLDV: 30 MM HG (ref 25–47)
PO2 BLDV: 44 MM HG (ref 25–47)
PO2 BLDV: 46 MM HG (ref 25–47)
PO2 BLDV: 48 MM HG (ref 25–47)
POTASSIUM BLD-SCNC: 5.2 MMOL/L (ref 3.4–5.3)
POTASSIUM SERPL-SCNC: 3.3 MMOL/L (ref 3.4–5.3)
POTASSIUM SERPL-SCNC: 3.9 MMOL/L (ref 3.4–5.3)
POTASSIUM SERPL-SCNC: 4.2 MMOL/L (ref 3.4–5.3)
POTASSIUM SERPL-SCNC: 4.2 MMOL/L (ref 3.4–5.3)
POTASSIUM SERPL-SCNC: 4.4 MMOL/L (ref 3.4–5.3)
POTASSIUM SERPL-SCNC: 4.5 MMOL/L (ref 3.4–5.3)
POTASSIUM SERPL-SCNC: 4.6 MMOL/L (ref 3.4–5.3)
POTASSIUM SERPL-SCNC: 4.7 MMOL/L (ref 3.4–5.3)
POTASSIUM SERPL-SCNC: 4.8 MMOL/L (ref 3.4–5.3)
POTASSIUM SERPL-SCNC: 4.9 MMOL/L (ref 3.4–5.3)
POTASSIUM SERPL-SCNC: 4.9 MMOL/L (ref 3.4–5.3)
POTASSIUM SERPL-SCNC: 5 MMOL/L (ref 3.4–5.3)
POTASSIUM SERPL-SCNC: 5 MMOL/L (ref 3.4–5.3)
POTASSIUM SERPL-SCNC: 5.1 MMOL/L (ref 3.4–5.3)
POTASSIUM SERPL-SCNC: 5.3 MMOL/L (ref 3.4–5.3)
POTASSIUM SERPL-SCNC: 6.5 MMOL/L (ref 3.4–5.3)
PROT SERPL-MCNC: 7 G/DL (ref 6.8–8.8)
PROT SERPL-MCNC: 7.7 G/DL (ref 6.8–8.8)
PROT SERPL-MCNC: 8 G/DL (ref 6.8–8.8)
RBC # BLD AUTO: 2.03 10E12/L (ref 4.4–5.9)
RBC # BLD AUTO: 2.13 10E12/L (ref 4.4–5.9)
RBC # BLD AUTO: 2.23 10E12/L (ref 4.4–5.9)
RBC # BLD AUTO: 2.57 10E12/L (ref 4.4–5.9)
RBC # BLD AUTO: 2.64 10E12/L (ref 4.4–5.9)
RBC # BLD AUTO: 2.8 10E12/L (ref 4.4–5.9)
RBC # BLD AUTO: 2.81 10E12/L (ref 4.4–5.9)
RBC # BLD AUTO: 2.83 10E12/L (ref 4.4–5.9)
RBC # BLD AUTO: 2.92 10E12/L (ref 4.4–5.9)
RBC # BLD AUTO: 2.94 10E12/L (ref 4.4–5.9)
RBC # BLD AUTO: 2.97 10E12/L (ref 4.4–5.9)
RBC # BLD AUTO: 3 10E12/L (ref 4.4–5.9)
RBC # BLD AUTO: 3.01 10E12/L (ref 4.4–5.9)
RBC # BLD AUTO: 3.07 10E12/L (ref 4.4–5.9)
RBC # BLD AUTO: 3.12 10E12/L (ref 4.4–5.9)
RBC # BLD AUTO: 3.25 10E12/L (ref 4.4–5.9)
RBC # BLD AUTO: 3.25 10E12/L (ref 4.4–5.9)
RBC # BLD AUTO: 3.36 10E12/L (ref 4.4–5.9)
RBC # BLD AUTO: 3.4 10E12/L (ref 4.4–5.9)
RBC # BLD AUTO: 3.68 10E12/L (ref 4.4–5.9)
SAO2 % BLDV FROM PO2: 39 %
SAO2 % BLDV FROM PO2: 78 %
SODIUM BLD-SCNC: 131 MMOL/L (ref 133–144)
SODIUM SERPL-SCNC: 127 MMOL/L (ref 133–144)
SODIUM SERPL-SCNC: 128 MMOL/L (ref 133–144)
SODIUM SERPL-SCNC: 129 MMOL/L (ref 133–144)
SODIUM SERPL-SCNC: 129 MMOL/L (ref 133–144)
SODIUM SERPL-SCNC: 130 MMOL/L (ref 133–144)
SODIUM SERPL-SCNC: 131 MMOL/L (ref 133–144)
SODIUM SERPL-SCNC: 131 MMOL/L (ref 133–144)
SODIUM SERPL-SCNC: 132 MMOL/L (ref 133–144)
SODIUM SERPL-SCNC: 133 MMOL/L (ref 133–144)
SODIUM SERPL-SCNC: 134 MMOL/L (ref 133–144)
SODIUM SERPL-SCNC: 135 MMOL/L (ref 133–144)
SODIUM SERPL-SCNC: 135 MMOL/L (ref 133–144)
SODIUM SERPL-SCNC: 136 MMOL/L (ref 133–144)
SPECIMEN EXP DATE BLD: NORMAL
SPECIMEN SOURCE: NORMAL
T4 FREE SERPL-MCNC: 0.7 NG/DL (ref 0.76–1.46)
TIBC SERPL-MCNC: 171 UG/DL (ref 240–430)
TRANSF REACT PLASRBC-IMP: NORMAL
TRANSFUSION STATUS PATIENT QL: NORMAL
TROPONIN I SERPL-MCNC: 0.02 UG/L (ref 0–0.04)
TROPONIN I SERPL-MCNC: 0.03 UG/L (ref 0–0.04)
TROPONIN I SERPL-MCNC: 0.04 UG/L (ref 0–0.04)
TROPONIN I SERPL-MCNC: 0.04 UG/L (ref 0–0.04)
TROPONIN I SERPL-MCNC: 0.05 UG/L (ref 0–0.04)
TROPONIN I SERPL-MCNC: 0.08 UG/L (ref 0–0.04)
TROPONIN I SERPL-MCNC: 0.1 UG/L (ref 0–0.04)
TROPONIN I SERPL-MCNC: 0.1 UG/L (ref 0–0.04)
TROPONIN I SERPL-MCNC: 0.12 UG/L (ref 0–0.04)
TROPONIN I SERPL-MCNC: 0.13 UG/L (ref 0–0.04)
TROPONIN I SERPL-MCNC: 0.14 UG/L (ref 0–0.04)
TROPONIN I SERPL-MCNC: 0.14 UG/L (ref 0–0.04)
TROPONIN I SERPL-MCNC: 0.16 UG/L (ref 0–0.04)
TROPONIN I SERPL-MCNC: 0.17 UG/L (ref 0–0.04)
TROPONIN I SERPL-MCNC: 0.17 UG/L (ref 0–0.04)
TROPONIN I SERPL-MCNC: 0.25 UG/L (ref 0–0.04)
TROPONIN I SERPL-MCNC: 0.48 UG/L (ref 0–0.04)
TROPONIN I SERPL-MCNC: 0.58 UG/L (ref 0–0.04)
TROPONIN I SERPL-MCNC: <0.015 UG/L (ref 0–0.04)
TSH SERPL DL<=0.005 MIU/L-ACNC: 4.48 MU/L (ref 0.4–4)
VARIANT LYMPHS BLD QL SMEAR: PRESENT
WBC # BLD AUTO: 22.5 10E9/L (ref 4–11)
WBC # BLD AUTO: 4.5 10E9/L (ref 4–11)
WBC # BLD AUTO: 4.8 10E9/L (ref 4–11)
WBC # BLD AUTO: 4.8 10E9/L (ref 4–11)
WBC # BLD AUTO: 5 10E9/L (ref 4–11)
WBC # BLD AUTO: 5.1 10E9/L (ref 4–11)
WBC # BLD AUTO: 5.1 10E9/L (ref 4–11)
WBC # BLD AUTO: 5.2 10E9/L (ref 4–11)
WBC # BLD AUTO: 5.2 10E9/L (ref 4–11)
WBC # BLD AUTO: 5.3 10E9/L (ref 4–11)
WBC # BLD AUTO: 5.5 10E9/L (ref 4–11)
WBC # BLD AUTO: 5.7 10E9/L (ref 4–11)
WBC # BLD AUTO: 5.8 10E9/L (ref 4–11)
WBC # BLD AUTO: 5.9 10E9/L (ref 4–11)
WBC # BLD AUTO: 6.3 10E9/L (ref 4–11)
WBC # BLD AUTO: 6.3 10E9/L (ref 4–11)
WBC # BLD AUTO: 6.7 10E9/L (ref 4–11)
WBC # BLD AUTO: 6.7 10E9/L (ref 4–11)
WBC # BLD AUTO: 6.9 10E9/L (ref 4–11)
WBC # BLD AUTO: 8.4 10E9/L (ref 4–11)

## 2019-01-01 PROCEDURE — 25000131 ZZH RX MED GY IP 250 OP 636 PS 637: Mod: GY | Performed by: INTERNAL MEDICINE

## 2019-01-01 PROCEDURE — 25000132 ZZH RX MED GY IP 250 OP 250 PS 637: Mod: GY | Performed by: INTERNAL MEDICINE

## 2019-01-01 PROCEDURE — 80048 BASIC METABOLIC PNL TOTAL CA: CPT | Performed by: PHYSICIAN ASSISTANT

## 2019-01-01 PROCEDURE — 84439 ASSAY OF FREE THYROXINE: CPT | Performed by: EMERGENCY MEDICINE

## 2019-01-01 PROCEDURE — 84484 ASSAY OF TROPONIN QUANT: CPT | Performed by: EMERGENCY MEDICINE

## 2019-01-01 PROCEDURE — 25000132 ZZH RX MED GY IP 250 OP 250 PS 637: Performed by: HOSPITALIST

## 2019-01-01 PROCEDURE — 97161 PT EVAL LOW COMPLEX 20 MIN: CPT | Mod: GP | Performed by: PHYSICAL THERAPIST

## 2019-01-01 PROCEDURE — 40000275 ZZH STATISTIC RCP TIME EA 10 MIN

## 2019-01-01 PROCEDURE — 83735 ASSAY OF MAGNESIUM: CPT | Performed by: INTERNAL MEDICINE

## 2019-01-01 PROCEDURE — 63400005 ZZH RX 634: Performed by: INTERNAL MEDICINE

## 2019-01-01 PROCEDURE — 94640 AIRWAY INHALATION TREATMENT: CPT

## 2019-01-01 PROCEDURE — 97110 THERAPEUTIC EXERCISES: CPT | Mod: GP

## 2019-01-01 PROCEDURE — 25000131 ZZH RX MED GY IP 250 OP 636 PS 637: Mod: GY | Performed by: HOSPITALIST

## 2019-01-01 PROCEDURE — 97161 PT EVAL LOW COMPLEX 20 MIN: CPT | Mod: GP

## 2019-01-01 PROCEDURE — 90937 HEMODIALYSIS REPEATED EVAL: CPT

## 2019-01-01 PROCEDURE — 96372 THER/PROPH/DIAG INJ SC/IM: CPT | Mod: 59

## 2019-01-01 PROCEDURE — 80069 RENAL FUNCTION PANEL: CPT | Performed by: INTERNAL MEDICINE

## 2019-01-01 PROCEDURE — 25000131 ZZH RX MED GY IP 250 OP 636 PS 637: Performed by: HOSPITALIST

## 2019-01-01 PROCEDURE — 40000341 ZZHCL STATISTIC BB TRANSF RXN INVEST: Performed by: EMERGENCY MEDICINE

## 2019-01-01 PROCEDURE — 00000146 ZZHCL STATISTIC GLUCOSE BY METER IP

## 2019-01-01 PROCEDURE — 85027 COMPLETE CBC AUTOMATED: CPT | Performed by: INTERNAL MEDICINE

## 2019-01-01 PROCEDURE — 84484 ASSAY OF TROPONIN QUANT: CPT | Performed by: HOSPITALIST

## 2019-01-01 PROCEDURE — 86900 BLOOD TYPING SEROLOGIC ABO: CPT | Performed by: EMERGENCY MEDICINE

## 2019-01-01 PROCEDURE — 80047 BASIC METABLC PNL IONIZED CA: CPT

## 2019-01-01 PROCEDURE — 99233 SBSQ HOSP IP/OBS HIGH 50: CPT | Performed by: INTERNAL MEDICINE

## 2019-01-01 PROCEDURE — 99285 EMERGENCY DEPT VISIT HI MDM: CPT | Mod: 25

## 2019-01-01 PROCEDURE — 83880 ASSAY OF NATRIURETIC PEPTIDE: CPT | Performed by: EMERGENCY MEDICINE

## 2019-01-01 PROCEDURE — 99207 ZZC CDG-CRITICAL CARE TIME NOT DOCUMENTED: CPT | Performed by: INTERNAL MEDICINE

## 2019-01-01 PROCEDURE — 71045 X-RAY EXAM CHEST 1 VIEW: CPT

## 2019-01-01 PROCEDURE — 97110 THERAPEUTIC EXERCISES: CPT | Mod: GP | Performed by: PHYSICAL THERAPIST

## 2019-01-01 PROCEDURE — 86078 PHYS BLOOD BANK SERV REACTJ: CPT | Performed by: EMERGENCY MEDICINE

## 2019-01-01 PROCEDURE — 71046 X-RAY EXAM CHEST 2 VIEWS: CPT

## 2019-01-01 PROCEDURE — A9270 NON-COVERED ITEM OR SERVICE: HCPCS | Performed by: HOSPITALIST

## 2019-01-01 PROCEDURE — 85049 AUTOMATED PLATELET COUNT: CPT | Performed by: HOSPITALIST

## 2019-01-01 PROCEDURE — 96374 THER/PROPH/DIAG INJ IV PUSH: CPT

## 2019-01-01 PROCEDURE — 25000128 H RX IP 250 OP 636: Performed by: INTERNAL MEDICINE

## 2019-01-01 PROCEDURE — 85049 AUTOMATED PLATELET COUNT: CPT | Performed by: INTERNAL MEDICINE

## 2019-01-01 PROCEDURE — 25800030 ZZH RX IP 258 OP 636: Performed by: EMERGENCY MEDICINE

## 2019-01-01 PROCEDURE — 84484 ASSAY OF TROPONIN QUANT: CPT | Performed by: INTERNAL MEDICINE

## 2019-01-01 PROCEDURE — 96367 TX/PROPH/DG ADDL SEQ IV INF: CPT

## 2019-01-01 PROCEDURE — 99223 1ST HOSP IP/OBS HIGH 75: CPT | Mod: AI | Performed by: INTERNAL MEDICINE

## 2019-01-01 PROCEDURE — 94660 CPAP INITIATION&MGMT: CPT

## 2019-01-01 PROCEDURE — 5A1D70Z PERFORMANCE OF URINARY FILTRATION, INTERMITTENT, LESS THAN 6 HOURS PER DAY: ICD-10-PCS | Performed by: INTERNAL MEDICINE

## 2019-01-01 PROCEDURE — 93010 ELECTROCARDIOGRAM REPORT: CPT | Performed by: INTERNAL MEDICINE

## 2019-01-01 PROCEDURE — 25000132 ZZH RX MED GY IP 250 OP 250 PS 637: Performed by: INTERNAL MEDICINE

## 2019-01-01 PROCEDURE — A9270 NON-COVERED ITEM OR SERVICE: HCPCS | Performed by: INTERNAL MEDICINE

## 2019-01-01 PROCEDURE — 85520 HEPARIN ASSAY: CPT | Performed by: INTERNAL MEDICINE

## 2019-01-01 PROCEDURE — 84132 ASSAY OF SERUM POTASSIUM: CPT | Performed by: INTERNAL MEDICINE

## 2019-01-01 PROCEDURE — 85025 COMPLETE CBC W/AUTO DIFF WBC: CPT | Performed by: INTERNAL MEDICINE

## 2019-01-01 PROCEDURE — 99223 1ST HOSP IP/OBS HIGH 75: CPT | Mod: 24 | Performed by: INTERNAL MEDICINE

## 2019-01-01 PROCEDURE — 85014 HEMATOCRIT: CPT | Mod: 91

## 2019-01-01 PROCEDURE — 80048 BASIC METABOLIC PNL TOTAL CA: CPT | Performed by: HOSPITALIST

## 2019-01-01 PROCEDURE — G0378 HOSPITAL OBSERVATION PER HR: HCPCS

## 2019-01-01 PROCEDURE — 25000125 ZZHC RX 250: Performed by: INTERNAL MEDICINE

## 2019-01-01 PROCEDURE — 99232 SBSQ HOSP IP/OBS MODERATE 35: CPT | Performed by: INTERNAL MEDICINE

## 2019-01-01 PROCEDURE — 93294 REM INTERROG EVL PM/LDLS PM: CPT | Performed by: INTERNAL MEDICINE

## 2019-01-01 PROCEDURE — 36415 COLL VENOUS BLD VENIPUNCTURE: CPT | Performed by: INTERNAL MEDICINE

## 2019-01-01 PROCEDURE — 85025 COMPLETE CBC W/AUTO DIFF WBC: CPT | Performed by: EMERGENCY MEDICINE

## 2019-01-01 PROCEDURE — 97530 THERAPEUTIC ACTIVITIES: CPT | Mod: GP

## 2019-01-01 PROCEDURE — 40000986 XR CHEST PORT 1 VW

## 2019-01-01 PROCEDURE — 93005 ELECTROCARDIOGRAM TRACING: CPT

## 2019-01-01 PROCEDURE — 21000001 ZZH R&B HEART CARE

## 2019-01-01 PROCEDURE — 99292 CRITICAL CARE ADDL 30 MIN: CPT

## 2019-01-01 PROCEDURE — 36416 COLLJ CAPILLARY BLOOD SPEC: CPT | Performed by: INTERNAL MEDICINE

## 2019-01-01 PROCEDURE — 20000003 ZZH R&B ICU

## 2019-01-01 PROCEDURE — 96375 TX/PRO/DX INJ NEW DRUG ADDON: CPT

## 2019-01-01 PROCEDURE — 36600 WITHDRAWAL OF ARTERIAL BLOOD: CPT

## 2019-01-01 PROCEDURE — 25000131 ZZH RX MED GY IP 250 OP 636 PS 637: Mod: GY | Performed by: PHYSICIAN ASSISTANT

## 2019-01-01 PROCEDURE — 99217 ZZC OBSERVATION CARE DISCHARGE: CPT | Performed by: PHYSICIAN ASSISTANT

## 2019-01-01 PROCEDURE — 86923 COMPATIBILITY TEST ELECTRIC: CPT | Performed by: HOSPITALIST

## 2019-01-01 PROCEDURE — P9016 RBC LEUKOCYTES REDUCED: HCPCS | Performed by: HOSPITALIST

## 2019-01-01 PROCEDURE — 99207 ZZC CDG-MDM COMPONENT: MEETS LOW - DOWN CODED: CPT | Performed by: INTERNAL MEDICINE

## 2019-01-01 PROCEDURE — 82140 ASSAY OF AMMONIA: CPT | Performed by: INTERNAL MEDICINE

## 2019-01-01 PROCEDURE — 25000132 ZZH RX MED GY IP 250 OP 250 PS 637: Mod: GY | Performed by: PHYSICIAN ASSISTANT

## 2019-01-01 PROCEDURE — 25800030 ZZH RX IP 258 OP 636: Performed by: INTERNAL MEDICINE

## 2019-01-01 PROCEDURE — 83605 ASSAY OF LACTIC ACID: CPT

## 2019-01-01 PROCEDURE — 80048 BASIC METABOLIC PNL TOTAL CA: CPT | Performed by: EMERGENCY MEDICINE

## 2019-01-01 PROCEDURE — 25000132 ZZH RX MED GY IP 250 OP 250 PS 637: Mod: GY

## 2019-01-01 PROCEDURE — 80048 BASIC METABOLIC PNL TOTAL CA: CPT | Performed by: INTERNAL MEDICINE

## 2019-01-01 PROCEDURE — 82803 BLOOD GASES ANY COMBINATION: CPT

## 2019-01-01 PROCEDURE — 87641 MR-STAPH DNA AMP PROBE: CPT | Performed by: INTERNAL MEDICINE

## 2019-01-01 PROCEDURE — 83036 HEMOGLOBIN GLYCOSYLATED A1C: CPT | Performed by: EMERGENCY MEDICINE

## 2019-01-01 PROCEDURE — 86901 BLOOD TYPING SEROLOGIC RH(D): CPT | Performed by: EMERGENCY MEDICINE

## 2019-01-01 PROCEDURE — 36556 INSERT NON-TUNNEL CV CATH: CPT

## 2019-01-01 PROCEDURE — 86901 BLOOD TYPING SEROLOGIC RH(D): CPT | Performed by: HOSPITALIST

## 2019-01-01 PROCEDURE — 25800025 ZZH RX 258: Performed by: INTERNAL MEDICINE

## 2019-01-01 PROCEDURE — 96366 THER/PROPH/DIAG IV INF ADDON: CPT | Mod: 59

## 2019-01-01 PROCEDURE — 86900 BLOOD TYPING SEROLOGIC ABO: CPT | Performed by: HOSPITALIST

## 2019-01-01 PROCEDURE — 85018 HEMOGLOBIN: CPT | Performed by: INTERNAL MEDICINE

## 2019-01-01 PROCEDURE — 93296 REM INTERROG EVL PM/IDS: CPT | Performed by: INTERNAL MEDICINE

## 2019-01-01 PROCEDURE — 25000128 H RX IP 250 OP 636: Performed by: HOSPITALIST

## 2019-01-01 PROCEDURE — P9016 RBC LEUKOCYTES REDUCED: HCPCS | Performed by: EMERGENCY MEDICINE

## 2019-01-01 PROCEDURE — 93010 ELECTROCARDIOGRAM REPORT: CPT | Mod: 76 | Performed by: INTERNAL MEDICINE

## 2019-01-01 PROCEDURE — 93005 ELECTROCARDIOGRAM TRACING: CPT | Mod: 76

## 2019-01-01 PROCEDURE — 93005 ELECTROCARDIOGRAM TRACING: CPT | Mod: 59

## 2019-01-01 PROCEDURE — 97116 GAIT TRAINING THERAPY: CPT | Mod: GP

## 2019-01-01 PROCEDURE — 63400005 ZZH RX 634: Mod: EC | Performed by: INTERNAL MEDICINE

## 2019-01-01 PROCEDURE — 82803 BLOOD GASES ANY COMBINATION: CPT | Performed by: INTERNAL MEDICINE

## 2019-01-01 PROCEDURE — 93280 PM DEVICE PROGR EVAL DUAL: CPT | Performed by: INTERNAL MEDICINE

## 2019-01-01 PROCEDURE — 83735 ASSAY OF MAGNESIUM: CPT | Performed by: EMERGENCY MEDICINE

## 2019-01-01 PROCEDURE — 80053 COMPREHEN METABOLIC PANEL: CPT | Performed by: EMERGENCY MEDICINE

## 2019-01-01 PROCEDURE — 97116 GAIT TRAINING THERAPY: CPT | Mod: GP | Performed by: PHYSICAL THERAPIST

## 2019-01-01 PROCEDURE — 70450 CT HEAD/BRAIN W/O DYE: CPT

## 2019-01-01 PROCEDURE — 80048 BASIC METABOLIC PNL TOTAL CA: CPT | Mod: 91 | Performed by: EMERGENCY MEDICINE

## 2019-01-01 PROCEDURE — 84100 ASSAY OF PHOSPHORUS: CPT | Performed by: INTERNAL MEDICINE

## 2019-01-01 PROCEDURE — 99291 CRITICAL CARE FIRST HOUR: CPT | Mod: 25

## 2019-01-01 PROCEDURE — 83605 ASSAY OF LACTIC ACID: CPT | Performed by: EMERGENCY MEDICINE

## 2019-01-01 PROCEDURE — 25000128 H RX IP 250 OP 636: Performed by: EMERGENCY MEDICINE

## 2019-01-01 PROCEDURE — 82803 BLOOD GASES ANY COMBINATION: CPT | Mod: 91

## 2019-01-01 PROCEDURE — 12000011 ZZH R&B MS OVERFLOW

## 2019-01-01 PROCEDURE — 82805 BLOOD GASES W/O2 SATURATION: CPT | Performed by: EMERGENCY MEDICINE

## 2019-01-01 PROCEDURE — 78580 LUNG PERFUSION IMAGING: CPT

## 2019-01-01 PROCEDURE — 12000000 ZZH R&B MED SURG/OB

## 2019-01-01 PROCEDURE — 93005 ELECTROCARDIOGRAM TRACING: CPT | Mod: 76,59

## 2019-01-01 PROCEDURE — 82805 BLOOD GASES W/O2 SATURATION: CPT | Performed by: INTERNAL MEDICINE

## 2019-01-01 PROCEDURE — 99239 HOSP IP/OBS DSCHRG MGMT >30: CPT | Performed by: INTERNAL MEDICINE

## 2019-01-01 PROCEDURE — 96366 THER/PROPH/DIAG IV INF ADDON: CPT

## 2019-01-01 PROCEDURE — 40000894 ZZH STATISTIC OT IP EVAL DEFER

## 2019-01-01 PROCEDURE — 36415 COLL VENOUS BLD VENIPUNCTURE: CPT | Performed by: HOSPITALIST

## 2019-01-01 PROCEDURE — 99223 1ST HOSP IP/OBS HIGH 75: CPT | Mod: AI | Performed by: HOSPITALIST

## 2019-01-01 PROCEDURE — 85025 COMPLETE CBC W/AUTO DIFF WBC: CPT | Performed by: HOSPITALIST

## 2019-01-01 PROCEDURE — 84484 ASSAY OF TROPONIN QUANT: CPT | Mod: 91 | Performed by: PHYSICIAN ASSISTANT

## 2019-01-01 PROCEDURE — 80053 COMPREHEN METABOLIC PANEL: CPT | Performed by: INTERNAL MEDICINE

## 2019-01-01 PROCEDURE — 85018 HEMOGLOBIN: CPT | Performed by: HOSPITALIST

## 2019-01-01 PROCEDURE — 85379 FIBRIN DEGRADATION QUANT: CPT | Performed by: EMERGENCY MEDICINE

## 2019-01-01 PROCEDURE — 83036 HEMOGLOBIN GLYCOSYLATED A1C: CPT | Performed by: HOSPITALIST

## 2019-01-01 PROCEDURE — 86923 COMPATIBILITY TEST ELECTRIC: CPT | Performed by: EMERGENCY MEDICINE

## 2019-01-01 PROCEDURE — 96368 THER/DIAG CONCURRENT INF: CPT | Mod: 59

## 2019-01-01 PROCEDURE — 96365 THER/PROPH/DIAG IV INF INIT: CPT

## 2019-01-01 PROCEDURE — 94002 VENT MGMT INPAT INIT DAY: CPT | Mod: 59

## 2019-01-01 PROCEDURE — 25000132 ZZH RX MED GY IP 250 OP 250 PS 637: Mod: GY | Performed by: EMERGENCY MEDICINE

## 2019-01-01 PROCEDURE — 83550 IRON BINDING TEST: CPT | Performed by: PATHOLOGY

## 2019-01-01 PROCEDURE — 93306 TTE W/DOPPLER COMPLETE: CPT

## 2019-01-01 PROCEDURE — 97530 THERAPEUTIC ACTIVITIES: CPT | Mod: GP | Performed by: PHYSICAL THERAPIST

## 2019-01-01 PROCEDURE — 99207 ZZC APP CREDIT; MD BILLING SHARED VISIT: CPT | Performed by: INTERNAL MEDICINE

## 2019-01-01 PROCEDURE — 99285 EMERGENCY DEPT VISIT HI MDM: CPT

## 2019-01-01 PROCEDURE — 96365 THER/PROPH/DIAG IV INF INIT: CPT | Mod: 59

## 2019-01-01 PROCEDURE — 83690 ASSAY OF LIPASE: CPT | Performed by: EMERGENCY MEDICINE

## 2019-01-01 PROCEDURE — 86850 RBC ANTIBODY SCREEN: CPT | Performed by: HOSPITALIST

## 2019-01-01 PROCEDURE — 34300033 ZZH RX 343: Performed by: EMERGENCY MEDICINE

## 2019-01-01 PROCEDURE — 40000276 ZZH STATISTIC RCP TIME ED VENT EA 10 MIN

## 2019-01-01 PROCEDURE — 36415 COLL VENOUS BLD VENIPUNCTURE: CPT | Performed by: PHYSICIAN ASSISTANT

## 2019-01-01 PROCEDURE — 99220 ZZC INITIAL OBSERVATION CARE,LEVL III: CPT | Performed by: PHYSICIAN ASSISTANT

## 2019-01-01 PROCEDURE — 36415 COLL VENOUS BLD VENIPUNCTURE: CPT | Performed by: EMERGENCY MEDICINE

## 2019-01-01 PROCEDURE — 97165 OT EVAL LOW COMPLEX 30 MIN: CPT | Mod: GO

## 2019-01-01 PROCEDURE — 96376 TX/PRO/DX INJ SAME DRUG ADON: CPT | Mod: 59

## 2019-01-01 PROCEDURE — G0257 UNSCHED DIALYSIS ESRD PT HOS: HCPCS

## 2019-01-01 PROCEDURE — 97535 SELF CARE MNGMENT TRAINING: CPT | Mod: GO

## 2019-01-01 PROCEDURE — 40000141 ZZH STATISTIC PERIPHERAL IV START W/O US GUIDANCE

## 2019-01-01 PROCEDURE — 25000132 ZZH RX MED GY IP 250 OP 250 PS 637: Mod: GY | Performed by: HOSPITALIST

## 2019-01-01 PROCEDURE — 99291 CRITICAL CARE FIRST HOUR: CPT | Performed by: INTERNAL MEDICINE

## 2019-01-01 PROCEDURE — 80076 HEPATIC FUNCTION PANEL: CPT | Performed by: INTERNAL MEDICINE

## 2019-01-01 PROCEDURE — 83540 ASSAY OF IRON: CPT | Performed by: PATHOLOGY

## 2019-01-01 PROCEDURE — 99207 ZZC CDG-CRITICAL CARE TIME NOT DOCUMENTED: CPT | Performed by: HOSPITALIST

## 2019-01-01 PROCEDURE — 96372 THER/PROPH/DIAG INJ SC/IM: CPT

## 2019-01-01 PROCEDURE — 85610 PROTHROMBIN TIME: CPT | Performed by: EMERGENCY MEDICINE

## 2019-01-01 PROCEDURE — 97113 AQUATIC THERAPY/EXERCISES: CPT | Mod: GP

## 2019-01-01 PROCEDURE — A9540 TC99M MAA: HCPCS | Performed by: EMERGENCY MEDICINE

## 2019-01-01 PROCEDURE — 86850 RBC ANTIBODY SCREEN: CPT | Performed by: EMERGENCY MEDICINE

## 2019-01-01 PROCEDURE — 85027 COMPLETE CBC AUTOMATED: CPT | Performed by: HOSPITALIST

## 2019-01-01 PROCEDURE — 87040 BLOOD CULTURE FOR BACTERIA: CPT | Performed by: EMERGENCY MEDICINE

## 2019-01-01 PROCEDURE — 25000128 H RX IP 250 OP 636

## 2019-01-01 PROCEDURE — 31500 INSERT EMERGENCY AIRWAY: CPT

## 2019-01-01 PROCEDURE — 84443 ASSAY THYROID STIM HORMONE: CPT | Performed by: EMERGENCY MEDICINE

## 2019-01-01 PROCEDURE — 87640 STAPH A DNA AMP PROBE: CPT | Performed by: INTERNAL MEDICINE

## 2019-01-01 PROCEDURE — 36415 COLL VENOUS BLD VENIPUNCTURE: CPT | Mod: 59 | Performed by: EMERGENCY MEDICINE

## 2019-01-01 PROCEDURE — 36430 TRANSFUSION BLD/BLD COMPNT: CPT

## 2019-01-01 PROCEDURE — 25000125 ZZHC RX 250: Performed by: EMERGENCY MEDICINE

## 2019-01-01 PROCEDURE — 93306 TTE W/DOPPLER COMPLETE: CPT | Mod: 26 | Performed by: INTERNAL MEDICINE

## 2019-01-01 PROCEDURE — 82728 ASSAY OF FERRITIN: CPT | Performed by: PATHOLOGY

## 2019-01-01 PROCEDURE — 99214 OFFICE O/P EST MOD 30 MIN: CPT | Performed by: NURSE PRACTITIONER

## 2019-01-01 PROCEDURE — 82272 OCCULT BLD FECES 1-3 TESTS: CPT | Performed by: EMERGENCY MEDICINE

## 2019-01-01 PROCEDURE — 97530 THERAPEUTIC ACTIVITIES: CPT | Mod: GO

## 2019-01-01 PROCEDURE — 99233 SBSQ HOSP IP/OBS HIGH 50: CPT | Performed by: HOSPITALIST

## 2019-01-01 RX ORDER — PROCHLORPERAZINE 25 MG
12.5 SUPPOSITORY, RECTAL RECTAL EVERY 12 HOURS PRN
Status: DISCONTINUED | OUTPATIENT
Start: 2019-01-01 | End: 2019-01-01 | Stop reason: HOSPADM

## 2019-01-01 RX ORDER — ALBUMIN (HUMAN) 12.5 G/50ML
50 SOLUTION INTRAVENOUS
Status: DISCONTINUED | OUTPATIENT
Start: 2019-01-01 | End: 2019-01-01

## 2019-01-01 RX ORDER — PANTOPRAZOLE SODIUM 40 MG/1
40 TABLET, DELAYED RELEASE ORAL DAILY
Status: DISCONTINUED | OUTPATIENT
Start: 2019-01-01 | End: 2019-01-01 | Stop reason: HOSPADM

## 2019-01-01 RX ORDER — IRBESARTAN 300 MG/1
300 TABLET ORAL AT BEDTIME
Status: DISCONTINUED | OUTPATIENT
Start: 2019-01-01 | End: 2019-01-01 | Stop reason: HOSPADM

## 2019-01-01 RX ORDER — NITROGLYCERIN 0.4 MG/1
0.4 TABLET SUBLINGUAL EVERY 5 MIN PRN
Status: DISCONTINUED | OUTPATIENT
Start: 2019-01-01 | End: 2019-01-01 | Stop reason: HOSPADM

## 2019-01-01 RX ORDER — NOREPINEPHRINE BITARTRATE 0.06 MG/ML
0.03-0.4 INJECTION, SOLUTION INTRAVENOUS CONTINUOUS
Status: DISCONTINUED | OUTPATIENT
Start: 2019-01-01 | End: 2019-01-01 | Stop reason: HOSPADM

## 2019-01-01 RX ORDER — DEXTROSE MONOHYDRATE 25 G/50ML
25-50 INJECTION, SOLUTION INTRAVENOUS
Status: DISCONTINUED | OUTPATIENT
Start: 2019-01-01 | End: 2019-01-01 | Stop reason: HOSPADM

## 2019-01-01 RX ORDER — ISOSORBIDE MONONITRATE 60 MG/1
120 TABLET, EXTENDED RELEASE ORAL EVERY EVENING
Status: DISCONTINUED | OUTPATIENT
Start: 2019-01-01 | End: 2019-01-01 | Stop reason: HOSPADM

## 2019-01-01 RX ORDER — ACETAMINOPHEN 325 MG/1
650 TABLET ORAL EVERY 4 HOURS PRN
Status: DISCONTINUED | OUTPATIENT
Start: 2019-01-01 | End: 2019-01-01 | Stop reason: HOSPADM

## 2019-01-01 RX ORDER — HEPARIN SODIUM 1000 [USP'U]/ML
500 INJECTION, SOLUTION INTRAVENOUS; SUBCUTANEOUS
Status: DISCONTINUED | OUTPATIENT
Start: 2019-01-01 | End: 2019-01-01

## 2019-01-01 RX ORDER — LIDOCAINE 40 MG/G
CREAM TOPICAL
Status: DISCONTINUED | OUTPATIENT
Start: 2019-01-01 | End: 2019-01-01 | Stop reason: HOSPADM

## 2019-01-01 RX ORDER — AMLODIPINE BESYLATE 5 MG/1
5 TABLET ORAL 2 TIMES DAILY
Status: DISCONTINUED | OUTPATIENT
Start: 2019-01-01 | End: 2019-01-01

## 2019-01-01 RX ORDER — IPRATROPIUM BROMIDE AND ALBUTEROL SULFATE 2.5; .5 MG/3ML; MG/3ML
3 SOLUTION RESPIRATORY (INHALATION) EVERY 4 HOURS PRN
Status: DISCONTINUED | OUTPATIENT
Start: 2019-01-01 | End: 2019-01-01 | Stop reason: HOSPADM

## 2019-01-01 RX ORDER — ASPIRIN 325 MG
325 TABLET ORAL ONCE
Status: COMPLETED | OUTPATIENT
Start: 2019-01-01 | End: 2019-01-01

## 2019-01-01 RX ORDER — CARVEDILOL 25 MG/1
25 TABLET ORAL 2 TIMES DAILY WITH MEALS
Status: DISCONTINUED | OUTPATIENT
Start: 2019-01-01 | End: 2019-01-01

## 2019-01-01 RX ORDER — NITROGLYCERIN 20 MG/100ML
0.07-2 INJECTION INTRAVENOUS CONTINUOUS
Status: DISCONTINUED | OUTPATIENT
Start: 2019-01-01 | End: 2019-01-01

## 2019-01-01 RX ORDER — MIRTAZAPINE 15 MG/1
15 TABLET, FILM COATED ORAL AT BEDTIME
Status: DISCONTINUED | OUTPATIENT
Start: 2019-01-01 | End: 2019-01-01 | Stop reason: HOSPADM

## 2019-01-01 RX ORDER — POLYETHYLENE GLYCOL 3350 17 G/17G
17 POWDER, FOR SOLUTION ORAL DAILY PRN
Status: DISCONTINUED | OUTPATIENT
Start: 2019-01-01 | End: 2019-01-01 | Stop reason: HOSPADM

## 2019-01-01 RX ORDER — DAPSONE 100 MG/1
100 TABLET ORAL AT BEDTIME
Status: DISCONTINUED | OUTPATIENT
Start: 2019-01-01 | End: 2019-01-01 | Stop reason: HOSPADM

## 2019-01-01 RX ORDER — ONDANSETRON 2 MG/ML
4 INJECTION INTRAMUSCULAR; INTRAVENOUS EVERY 6 HOURS PRN
Status: DISCONTINUED | OUTPATIENT
Start: 2019-01-01 | End: 2019-01-01 | Stop reason: HOSPADM

## 2019-01-01 RX ORDER — GABAPENTIN 300 MG/1
300 CAPSULE ORAL PRN
COMMUNITY
End: 2020-01-01

## 2019-01-01 RX ORDER — AMOXICILLIN 250 MG
2 CAPSULE ORAL 2 TIMES DAILY PRN
Status: DISCONTINUED | OUTPATIENT
Start: 2019-01-01 | End: 2019-01-01 | Stop reason: HOSPADM

## 2019-01-01 RX ORDER — ALBUTEROL SULFATE 90 UG/1
2 AEROSOL, METERED RESPIRATORY (INHALATION) EVERY 6 HOURS PRN
Status: DISCONTINUED | OUTPATIENT
Start: 2019-01-01 | End: 2019-01-01 | Stop reason: HOSPADM

## 2019-01-01 RX ORDER — NITROGLYCERIN 0.4 MG/1
0.4 TABLET SUBLINGUAL EVERY 5 MIN PRN
Status: DISCONTINUED | OUTPATIENT
Start: 2019-01-01 | End: 2019-01-01

## 2019-01-01 RX ORDER — ABACAVIR 300 MG/1
600 TABLET ORAL EVERY EVENING
Status: DISCONTINUED | OUTPATIENT
Start: 2019-01-01 | End: 2019-01-01 | Stop reason: HOSPADM

## 2019-01-01 RX ORDER — ATORVASTATIN CALCIUM 40 MG/1
40 TABLET, FILM COATED ORAL AT BEDTIME
Status: DISCONTINUED | OUTPATIENT
Start: 2019-01-01 | End: 2019-01-01 | Stop reason: HOSPADM

## 2019-01-01 RX ORDER — ASPIRIN 81 MG/1
81 TABLET ORAL DAILY
Status: DISCONTINUED | OUTPATIENT
Start: 2019-01-01 | End: 2019-01-01 | Stop reason: HOSPADM

## 2019-01-01 RX ORDER — CLONIDINE HYDROCHLORIDE 0.1 MG/1
0.1 TABLET ORAL 2 TIMES DAILY
Status: DISCONTINUED | OUTPATIENT
Start: 2019-01-01 | End: 2019-01-01

## 2019-01-01 RX ORDER — CLONIDINE HYDROCHLORIDE 0.1 MG/1
0.1 TABLET ORAL DAILY PRN
COMMUNITY

## 2019-01-01 RX ORDER — FUROSEMIDE 10 MG/ML
80 INJECTION INTRAMUSCULAR; INTRAVENOUS ONCE
Status: COMPLETED | OUTPATIENT
Start: 2019-01-01 | End: 2019-01-01

## 2019-01-01 RX ORDER — NITROGLYCERIN 0.4 MG/1
TABLET SUBLINGUAL
Status: COMPLETED
Start: 2019-01-01 | End: 2019-01-01

## 2019-01-01 RX ORDER — BISACODYL 10 MG
10 SUPPOSITORY, RECTAL RECTAL DAILY PRN
Status: DISCONTINUED | OUTPATIENT
Start: 2019-01-01 | End: 2019-01-01 | Stop reason: HOSPADM

## 2019-01-01 RX ORDER — CLONIDINE HYDROCHLORIDE 0.1 MG/1
0.1 TABLET ORAL DAILY
Status: DISCONTINUED | OUTPATIENT
Start: 2019-01-01 | End: 2019-01-01

## 2019-01-01 RX ORDER — CARVEDILOL 12.5 MG/1
12.5 TABLET ORAL ONCE
Status: COMPLETED | OUTPATIENT
Start: 2019-01-01 | End: 2019-01-01

## 2019-01-01 RX ORDER — OMEGA-3-ACID ETHYL ESTERS 1 G/1
2 CAPSULE, LIQUID FILLED ORAL 2 TIMES DAILY
Status: DISCONTINUED | OUTPATIENT
Start: 2019-01-01 | End: 2019-01-01 | Stop reason: HOSPADM

## 2019-01-01 RX ORDER — ONDANSETRON 4 MG/1
4 TABLET, ORALLY DISINTEGRATING ORAL EVERY 6 HOURS PRN
Status: DISCONTINUED | OUTPATIENT
Start: 2019-01-01 | End: 2019-01-01 | Stop reason: HOSPADM

## 2019-01-01 RX ORDER — NICOTINE POLACRILEX 4 MG
15-30 LOZENGE BUCCAL
Status: DISCONTINUED | OUTPATIENT
Start: 2019-01-01 | End: 2019-01-01 | Stop reason: HOSPADM

## 2019-01-01 RX ORDER — IPRATROPIUM BROMIDE AND ALBUTEROL SULFATE 2.5; .5 MG/3ML; MG/3ML
1 SOLUTION RESPIRATORY (INHALATION) EVERY 6 HOURS PRN
Status: DISCONTINUED | OUTPATIENT
Start: 2019-01-01 | End: 2019-01-01 | Stop reason: HOSPADM

## 2019-01-01 RX ORDER — HEPARIN SODIUM 1000 [USP'U]/ML
500 INJECTION, SOLUTION INTRAVENOUS; SUBCUTANEOUS CONTINUOUS
Status: DISCONTINUED | OUTPATIENT
Start: 2019-01-01 | End: 2019-01-01

## 2019-01-01 RX ORDER — HYDRALAZINE HYDROCHLORIDE 20 MG/ML
10 INJECTION INTRAMUSCULAR; INTRAVENOUS EVERY 4 HOURS PRN
Status: DISCONTINUED | OUTPATIENT
Start: 2019-01-01 | End: 2019-01-01 | Stop reason: HOSPADM

## 2019-01-01 RX ORDER — CLONAZEPAM 0.5 MG/1
0.5 TABLET ORAL
Status: DISCONTINUED | OUTPATIENT
Start: 2019-01-01 | End: 2019-01-01 | Stop reason: HOSPADM

## 2019-01-01 RX ORDER — NITROGLYCERIN 20 MG/100ML
.07-2 INJECTION INTRAVENOUS CONTINUOUS
Status: DISCONTINUED | OUTPATIENT
Start: 2019-01-01 | End: 2019-01-01

## 2019-01-01 RX ORDER — CARVEDILOL 12.5 MG/1
25 TABLET ORAL 2 TIMES DAILY WITH MEALS
Status: DISCONTINUED | OUTPATIENT
Start: 2019-01-01 | End: 2019-01-01 | Stop reason: HOSPADM

## 2019-01-01 RX ORDER — CARVEDILOL 12.5 MG/1
12.5 TABLET ORAL 2 TIMES DAILY WITH MEALS
Status: DISCONTINUED | OUTPATIENT
Start: 2019-01-01 | End: 2019-01-01 | Stop reason: HOSPADM

## 2019-01-01 RX ORDER — CLONIDINE HYDROCHLORIDE 0.1 MG/1
0.1 TABLET ORAL DAILY PRN
Status: DISCONTINUED | OUTPATIENT
Start: 2019-01-01 | End: 2019-01-01 | Stop reason: HOSPADM

## 2019-01-01 RX ORDER — CLOPIDOGREL BISULFATE 75 MG/1
75 TABLET ORAL EVERY EVENING
Status: DISCONTINUED | OUTPATIENT
Start: 2019-01-01 | End: 2019-01-01 | Stop reason: HOSPADM

## 2019-01-01 RX ORDER — DOXERCALCIFEROL 4 UG/2ML
2 INJECTION INTRAVENOUS
Status: COMPLETED | OUTPATIENT
Start: 2019-01-01 | End: 2019-01-01

## 2019-01-01 RX ORDER — GUAIFENESIN 600 MG/1
1200 TABLET, EXTENDED RELEASE ORAL 2 TIMES DAILY PRN
Status: DISCONTINUED | OUTPATIENT
Start: 2019-01-01 | End: 2019-01-01 | Stop reason: HOSPADM

## 2019-01-01 RX ORDER — PROPOFOL 10 MG/ML
INJECTION, EMULSION INTRAVENOUS
Status: COMPLETED
Start: 2019-01-01 | End: 2019-01-01

## 2019-01-01 RX ORDER — ONDANSETRON 2 MG/ML
4 INJECTION INTRAMUSCULAR; INTRAVENOUS ONCE
Status: COMPLETED | OUTPATIENT
Start: 2019-01-01 | End: 2019-01-01

## 2019-01-01 RX ORDER — ALBUMIN, HUMAN INJ 5% 5 %
250 SOLUTION INTRAVENOUS
Status: DISCONTINUED | OUTPATIENT
Start: 2019-01-01 | End: 2019-01-01

## 2019-01-01 RX ORDER — GABAPENTIN 300 MG/1
300 CAPSULE ORAL 2 TIMES DAILY
Status: DISCONTINUED | OUTPATIENT
Start: 2019-01-01 | End: 2019-01-01

## 2019-01-01 RX ORDER — ALUMINA, MAGNESIA, AND SIMETHICONE 2400; 2400; 240 MG/30ML; MG/30ML; MG/30ML
30 SUSPENSION ORAL EVERY 4 HOURS PRN
Status: DISCONTINUED | OUTPATIENT
Start: 2019-01-01 | End: 2019-01-01 | Stop reason: HOSPADM

## 2019-01-01 RX ORDER — RITONAVIR 100 MG/1
100 TABLET ORAL AT BEDTIME
Status: DISCONTINUED | OUTPATIENT
Start: 2019-01-01 | End: 2019-01-01 | Stop reason: HOSPADM

## 2019-01-01 RX ORDER — ETOMIDATE 2 MG/ML
20 INJECTION INTRAVENOUS ONCE
Status: COMPLETED | OUTPATIENT
Start: 2019-01-01 | End: 2019-01-01

## 2019-01-01 RX ORDER — DOXERCALCIFEROL 4 UG/2ML
1 INJECTION INTRAVENOUS
Status: CANCELLED | OUTPATIENT
Start: 2019-01-01 | End: 2019-01-01

## 2019-01-01 RX ORDER — LABETALOL 20 MG/4 ML (5 MG/ML) INTRAVENOUS SYRINGE
10 ONCE
Status: COMPLETED | OUTPATIENT
Start: 2019-01-01 | End: 2019-01-01

## 2019-01-01 RX ORDER — LIDOCAINE HYDROCHLORIDE 20 MG/ML
6 JELLY TOPICAL ONCE
Status: DISCONTINUED | OUTPATIENT
Start: 2019-01-01 | End: 2019-01-01 | Stop reason: HOSPADM

## 2019-01-01 RX ORDER — POTASSIUM CHLORIDE 1500 MG/1
20 TABLET, EXTENDED RELEASE ORAL ONCE
Status: COMPLETED | OUTPATIENT
Start: 2019-01-01 | End: 2019-01-01

## 2019-01-01 RX ORDER — CINACALCET 30 MG/1
60 TABLET, FILM COATED ORAL
Status: DISCONTINUED | OUTPATIENT
Start: 2019-01-01 | End: 2019-01-01 | Stop reason: HOSPADM

## 2019-01-01 RX ORDER — KETOROLAC TROMETHAMINE 5 MG/ML
SOLUTION OPHTHALMIC
Refills: 1 | Status: ON HOLD | COMMUNITY
Start: 2019-01-01 | End: 2019-01-01

## 2019-01-01 RX ORDER — ACETAMINOPHEN 325 MG/1
325 TABLET ORAL EVERY 6 HOURS PRN
Status: DISCONTINUED | OUTPATIENT
Start: 2019-01-01 | End: 2019-01-01 | Stop reason: HOSPADM

## 2019-01-01 RX ORDER — ASPIRIN 81 MG/1
81 TABLET ORAL DAILY
Status: DISCONTINUED | OUTPATIENT
Start: 2019-01-01 | End: 2019-01-01

## 2019-01-01 RX ORDER — ACETAMINOPHEN 650 MG/1
650 SUPPOSITORY RECTAL EVERY 4 HOURS PRN
Status: DISCONTINUED | OUTPATIENT
Start: 2019-01-01 | End: 2019-01-01 | Stop reason: HOSPADM

## 2019-01-01 RX ORDER — KETOROLAC TROMETHAMINE 5 MG/ML
1 SOLUTION OPHTHALMIC 4 TIMES DAILY
Status: DISCONTINUED | OUTPATIENT
Start: 2019-01-01 | End: 2019-01-01 | Stop reason: HOSPADM

## 2019-01-01 RX ORDER — AMLODIPINE BESYLATE 5 MG/1
5 TABLET ORAL 2 TIMES DAILY
Status: DISCONTINUED | OUTPATIENT
Start: 2019-01-01 | End: 2019-01-01 | Stop reason: HOSPADM

## 2019-01-01 RX ORDER — GABAPENTIN 300 MG/1
300 CAPSULE ORAL
Status: DISCONTINUED | OUTPATIENT
Start: 2019-01-01 | End: 2019-01-01 | Stop reason: HOSPADM

## 2019-01-01 RX ORDER — HEPARIN SODIUM 5000 [USP'U]/.5ML
5000 INJECTION, SOLUTION INTRAVENOUS; SUBCUTANEOUS EVERY 12 HOURS
Status: DISCONTINUED | OUTPATIENT
Start: 2019-01-01 | End: 2019-01-01 | Stop reason: HOSPADM

## 2019-01-01 RX ORDER — HEPARIN SODIUM 1000 [USP'U]/ML
500 INJECTION, SOLUTION INTRAVENOUS; SUBCUTANEOUS CONTINUOUS
Status: CANCELLED | OUTPATIENT
Start: 2019-01-01

## 2019-01-01 RX ORDER — PREDNISOLONE ACETATE 10 MG/ML
SUSPENSION/ DROPS OPHTHALMIC
Status: ON HOLD | COMMUNITY
Start: 2019-01-01 | End: 2019-01-01

## 2019-01-01 RX ORDER — SODIUM CHLORIDE 9 MG/ML
INJECTION, SOLUTION INTRAVENOUS CONTINUOUS
Status: ACTIVE | OUTPATIENT
Start: 2019-01-01 | End: 2019-01-01

## 2019-01-01 RX ORDER — ASPIRIN 81 MG/1
81 TABLET ORAL EVERY EVENING
COMMUNITY

## 2019-01-01 RX ORDER — GABAPENTIN 300 MG/1
300 CAPSULE ORAL 2 TIMES DAILY
Status: DISCONTINUED | OUTPATIENT
Start: 2019-01-01 | End: 2019-01-01 | Stop reason: HOSPADM

## 2019-01-01 RX ORDER — CARVEDILOL 12.5 MG/1
12.5 TABLET ORAL 2 TIMES DAILY WITH MEALS
Status: DISCONTINUED | OUTPATIENT
Start: 2019-01-01 | End: 2019-01-01

## 2019-01-01 RX ORDER — CARBOXYMETHYLCELLULOSE SODIUM 5 MG/ML
1 SOLUTION/ DROPS OPHTHALMIC DAILY PRN
Status: DISCONTINUED | OUTPATIENT
Start: 2019-01-01 | End: 2019-01-01 | Stop reason: HOSPADM

## 2019-01-01 RX ORDER — POLYETHYLENE GLYCOL 3350 17 G/17G
17 POWDER, FOR SOLUTION ORAL 2 TIMES DAILY PRN
Status: DISCONTINUED | OUTPATIENT
Start: 2019-01-01 | End: 2019-01-01 | Stop reason: HOSPADM

## 2019-01-01 RX ORDER — HEPARIN SODIUM 10000 [USP'U]/100ML
1250 INJECTION, SOLUTION INTRAVENOUS CONTINUOUS
Status: DISCONTINUED | OUTPATIENT
Start: 2019-01-01 | End: 2019-01-01

## 2019-01-01 RX ORDER — CLONIDINE HYDROCHLORIDE 0.1 MG/1
0.1 TABLET ORAL DAILY PRN
Status: DISCONTINUED | OUTPATIENT
Start: 2019-01-01 | End: 2019-01-01

## 2019-01-01 RX ORDER — PROCHLORPERAZINE MALEATE 5 MG
5 TABLET ORAL EVERY 6 HOURS PRN
Status: DISCONTINUED | OUTPATIENT
Start: 2019-01-01 | End: 2019-01-01 | Stop reason: HOSPADM

## 2019-01-01 RX ORDER — HEPARIN SODIUM 1000 [USP'U]/ML
500 INJECTION, SOLUTION INTRAVENOUS; SUBCUTANEOUS
Status: CANCELLED | OUTPATIENT
Start: 2019-01-01 | End: 2019-01-01

## 2019-01-01 RX ORDER — LIDOCAINE 40 MG/G
CREAM TOPICAL
Status: DISCONTINUED | OUTPATIENT
Start: 2019-01-01 | End: 2019-01-01

## 2019-01-01 RX ORDER — ONDANSETRON 2 MG/ML
INJECTION INTRAMUSCULAR; INTRAVENOUS
Status: COMPLETED
Start: 2019-01-01 | End: 2019-01-01

## 2019-01-01 RX ORDER — CARVEDILOL 12.5 MG/1
12.5 TABLET ORAL 2 TIMES DAILY WITH MEALS
Qty: 60 TABLET | Refills: 1 | Status: ON HOLD | OUTPATIENT
Start: 2019-01-01 | End: 2020-01-01

## 2019-01-01 RX ORDER — NALOXONE HYDROCHLORIDE 0.4 MG/ML
.1-.4 INJECTION, SOLUTION INTRAMUSCULAR; INTRAVENOUS; SUBCUTANEOUS
Status: DISCONTINUED | OUTPATIENT
Start: 2019-01-01 | End: 2019-01-01 | Stop reason: HOSPADM

## 2019-01-01 RX ORDER — CLONAZEPAM 0.5 MG/1
0.5 TABLET ORAL
Status: DISCONTINUED | OUTPATIENT
Start: 2019-01-01 | End: 2019-01-01

## 2019-01-01 RX ORDER — HYDROCODONE BITARTRATE AND ACETAMINOPHEN 5; 325 MG/1; MG/1
1 TABLET ORAL EVERY 6 HOURS PRN
Status: DISCONTINUED | OUTPATIENT
Start: 2019-01-01 | End: 2019-01-01 | Stop reason: HOSPADM

## 2019-01-01 RX ORDER — ASPIRIN 81 MG/1
81 TABLET ORAL EVERY EVENING
Status: DISCONTINUED | OUTPATIENT
Start: 2019-01-01 | End: 2019-01-01

## 2019-01-01 RX ORDER — HEPARIN SODIUM 1000 [USP'U]/ML
500 INJECTION, SOLUTION INTRAVENOUS; SUBCUTANEOUS
Status: COMPLETED | OUTPATIENT
Start: 2019-01-01 | End: 2019-01-01

## 2019-01-01 RX ORDER — MAGNESIUM OXIDE 400 MG/1
400 TABLET ORAL AT BEDTIME
Status: DISCONTINUED | OUTPATIENT
Start: 2019-01-01 | End: 2019-01-01 | Stop reason: HOSPADM

## 2019-01-01 RX ORDER — ACETAMINOPHEN 325 MG/1
650 TABLET ORAL EVERY 4 HOURS PRN
Status: DISCONTINUED | OUTPATIENT
Start: 2019-01-01 | End: 2019-01-01

## 2019-01-01 RX ORDER — ASPIRIN 81 MG/1
81 TABLET ORAL EVERY EVENING
Status: DISCONTINUED | OUTPATIENT
Start: 2019-01-01 | End: 2019-01-01 | Stop reason: HOSPADM

## 2019-01-01 RX ORDER — CINACALCET 30 MG/1
30 TABLET, FILM COATED ORAL
Status: DISCONTINUED | OUTPATIENT
Start: 2019-01-01 | End: 2019-01-01 | Stop reason: HOSPADM

## 2019-01-01 RX ORDER — AMOXICILLIN 250 MG
1 CAPSULE ORAL 2 TIMES DAILY PRN
Status: DISCONTINUED | OUTPATIENT
Start: 2019-01-01 | End: 2019-01-01 | Stop reason: HOSPADM

## 2019-01-01 RX ORDER — NALOXONE HYDROCHLORIDE 0.4 MG/ML
.1-.4 INJECTION, SOLUTION INTRAMUSCULAR; INTRAVENOUS; SUBCUTANEOUS
Status: DISCONTINUED | OUTPATIENT
Start: 2019-01-01 | End: 2019-01-01

## 2019-01-01 RX ORDER — LABETALOL 100 MG/1
100 TABLET, FILM COATED ORAL EVERY 8 HOURS PRN
Status: DISCONTINUED | OUTPATIENT
Start: 2019-01-01 | End: 2019-01-01

## 2019-01-01 RX ORDER — AMLODIPINE BESYLATE 5 MG/1
5 TABLET ORAL 2 TIMES DAILY
Qty: 60 TABLET | Refills: 0 | Status: ON HOLD | OUTPATIENT
Start: 2019-01-01 | End: 2019-01-01

## 2019-01-01 RX ORDER — AMLODIPINE BESYLATE 5 MG/1
5 TABLET ORAL ONCE
Status: COMPLETED | OUTPATIENT
Start: 2019-01-01 | End: 2019-01-01

## 2019-01-01 RX ORDER — MOXIFLOXACIN 5 MG/ML
1 SOLUTION/ DROPS OPHTHALMIC 3 TIMES DAILY
Status: DISCONTINUED | OUTPATIENT
Start: 2019-01-01 | End: 2019-01-01 | Stop reason: HOSPADM

## 2019-01-01 RX ORDER — HYDRALAZINE HYDROCHLORIDE 20 MG/ML
10 INJECTION INTRAMUSCULAR; INTRAVENOUS ONCE
Status: DISCONTINUED | OUTPATIENT
Start: 2019-01-01 | End: 2019-01-01

## 2019-01-01 RX ORDER — NITROGLYCERIN 80 MG/1
1 PATCH TRANSDERMAL ONCE
Status: COMPLETED | OUTPATIENT
Start: 2019-01-01 | End: 2019-01-01

## 2019-01-01 RX ORDER — ISOSORBIDE MONONITRATE 30 MG/1
120 TABLET, EXTENDED RELEASE ORAL EVERY EVENING
Status: DISCONTINUED | OUTPATIENT
Start: 2019-01-01 | End: 2019-01-01 | Stop reason: HOSPADM

## 2019-01-01 RX ORDER — ASPIRIN 81 MG/1
324 TABLET, CHEWABLE ORAL ONCE
Status: DISCONTINUED | OUTPATIENT
Start: 2019-01-01 | End: 2019-01-01 | Stop reason: HOSPADM

## 2019-01-01 RX ORDER — PROPOFOL 10 MG/ML
80 INJECTION, EMULSION INTRAVENOUS ONCE
Status: DISCONTINUED | OUTPATIENT
Start: 2019-01-01 | End: 2019-01-01 | Stop reason: HOSPADM

## 2019-01-01 RX ORDER — CARVEDILOL 25 MG/1
25 TABLET ORAL 2 TIMES DAILY WITH MEALS
Status: DISCONTINUED | OUTPATIENT
Start: 2019-01-01 | End: 2019-01-01 | Stop reason: HOSPADM

## 2019-01-01 RX ORDER — CEFAZOLIN SODIUM 1 G/50ML
1750 SOLUTION INTRAVENOUS ONCE
Status: COMPLETED | OUTPATIENT
Start: 2019-01-01 | End: 2019-01-01

## 2019-01-01 RX ORDER — CARVEDILOL 25 MG/1
25 TABLET ORAL 2 TIMES DAILY WITH MEALS
Qty: 60 TABLET | Refills: 0 | Status: ON HOLD | OUTPATIENT
Start: 2019-01-01 | End: 2019-01-01

## 2019-01-01 RX ORDER — CINACALCET 30 MG/1
30 TABLET, FILM COATED ORAL
COMMUNITY

## 2019-01-01 RX ORDER — DOXERCALCIFEROL 4 UG/2ML
1 INJECTION INTRAVENOUS
Status: COMPLETED | OUTPATIENT
Start: 2019-01-01 | End: 2019-01-01

## 2019-01-01 RX ORDER — FUROSEMIDE 10 MG/ML
60 INJECTION INTRAMUSCULAR; INTRAVENOUS ONCE
Status: COMPLETED | OUTPATIENT
Start: 2019-01-01 | End: 2019-01-01

## 2019-01-01 RX ORDER — DOXERCALCIFEROL 4 UG/2ML
2.5 INJECTION INTRAVENOUS
Status: DISCONTINUED | OUTPATIENT
Start: 2019-01-01 | End: 2019-01-01 | Stop reason: HOSPADM

## 2019-01-01 RX ORDER — IPRATROPIUM BROMIDE AND ALBUTEROL SULFATE 2.5; .5 MG/3ML; MG/3ML
3 SOLUTION RESPIRATORY (INHALATION) ONCE
Status: DISCONTINUED | OUTPATIENT
Start: 2019-01-01 | End: 2019-01-01 | Stop reason: HOSPADM

## 2019-01-01 RX ORDER — IRBESARTAN 150 MG/1
300 TABLET ORAL AT BEDTIME
Status: DISCONTINUED | OUTPATIENT
Start: 2019-01-01 | End: 2019-01-01 | Stop reason: HOSPADM

## 2019-01-01 RX ORDER — NITROGLYCERIN 20 MG/100ML
.07-2 INJECTION INTRAVENOUS CONTINUOUS
Status: DISCONTINUED | OUTPATIENT
Start: 2019-01-01 | End: 2019-01-01 | Stop reason: HOSPADM

## 2019-01-01 RX ORDER — HYDRALAZINE HYDROCHLORIDE 20 MG/ML
10-20 INJECTION INTRAMUSCULAR; INTRAVENOUS EVERY 4 HOURS PRN
Status: DISCONTINUED | OUTPATIENT
Start: 2019-01-01 | End: 2019-01-01 | Stop reason: HOSPADM

## 2019-01-01 RX ORDER — DOXERCALCIFEROL 4 UG/2ML
4 INJECTION INTRAVENOUS
Status: COMPLETED | OUTPATIENT
Start: 2019-01-01 | End: 2019-01-01

## 2019-01-01 RX ORDER — ISOSORBIDE MONONITRATE 60 MG/1
120 TABLET, EXTENDED RELEASE ORAL DAILY
Status: DISCONTINUED | OUTPATIENT
Start: 2019-01-01 | End: 2019-01-01

## 2019-01-01 RX ORDER — DOXERCALCIFEROL 4 UG/2ML
1 INJECTION INTRAVENOUS
Status: DISCONTINUED | OUTPATIENT
Start: 2019-01-01 | End: 2019-01-01 | Stop reason: CLARIF

## 2019-01-01 RX ORDER — MORPHINE SULFATE 2 MG/ML
1 INJECTION, SOLUTION INTRAMUSCULAR; INTRAVENOUS
Status: DISCONTINUED | OUTPATIENT
Start: 2019-01-01 | End: 2019-01-01

## 2019-01-01 RX ORDER — PREDNISOLONE ACETATE 10 MG/ML
1 SUSPENSION/ DROPS OPHTHALMIC 4 TIMES DAILY
Status: DISCONTINUED | OUTPATIENT
Start: 2019-01-01 | End: 2019-01-01 | Stop reason: HOSPADM

## 2019-01-01 RX ORDER — LANOLIN ALCOHOL/MO/W.PET/CERES
3 CREAM (GRAM) TOPICAL
Status: DISCONTINUED | OUTPATIENT
Start: 2019-01-01 | End: 2019-01-01 | Stop reason: HOSPADM

## 2019-01-01 RX ORDER — GABAPENTIN 300 MG/1
300 CAPSULE ORAL DAILY PRN
Status: DISCONTINUED | OUTPATIENT
Start: 2019-01-01 | End: 2019-01-01 | Stop reason: HOSPADM

## 2019-01-01 RX ORDER — MIRTAZAPINE 15 MG/1
15 TABLET, FILM COATED ORAL AT BEDTIME
Status: DISCONTINUED | OUTPATIENT
Start: 2019-01-01 | End: 2019-01-01

## 2019-01-01 RX ORDER — MOXIFLOXACIN 5 MG/ML
SOLUTION/ DROPS OPHTHALMIC
Status: ON HOLD | COMMUNITY
Start: 2019-01-01 | End: 2019-01-01

## 2019-01-01 RX ORDER — ACETAMINOPHEN 500 MG
1000 TABLET ORAL ONCE
Status: COMPLETED | OUTPATIENT
Start: 2019-01-01 | End: 2019-01-01

## 2019-01-01 RX ADMIN — ABACAVIR 600 MG: 300 TABLET, FILM COATED ORAL at 21:42

## 2019-01-01 RX ADMIN — ISOSORBIDE MONONITRATE 120 MG: 60 TABLET, EXTENDED RELEASE ORAL at 20:20

## 2019-01-01 RX ADMIN — CLONIDINE HYDROCHLORIDE 0.1 MG: 0.1 TABLET ORAL at 20:41

## 2019-01-01 RX ADMIN — IRBESARTAN 300 MG: 300 TABLET ORAL at 21:22

## 2019-01-01 RX ADMIN — ISOSORBIDE MONONITRATE 120 MG: 60 TABLET, EXTENDED RELEASE ORAL at 22:09

## 2019-01-01 RX ADMIN — OMEGA-3-ACID ETHYL ESTERS 2 G: 1 CAPSULE, LIQUID FILLED ORAL at 21:36

## 2019-01-01 RX ADMIN — ATORVASTATIN CALCIUM 40 MG: 40 TABLET, FILM COATED ORAL at 00:57

## 2019-01-01 RX ADMIN — ATORVASTATIN CALCIUM 40 MG: 40 TABLET, FILM COATED ORAL at 21:22

## 2019-01-01 RX ADMIN — ASPIRIN 81 MG: 81 TABLET, COATED ORAL at 13:29

## 2019-01-01 RX ADMIN — AMLODIPINE BESYLATE 5 MG: 5 TABLET ORAL at 12:44

## 2019-01-01 RX ADMIN — ISOSORBIDE MONONITRATE 120 MG: 60 TABLET, EXTENDED RELEASE ORAL at 20:42

## 2019-01-01 RX ADMIN — INSULIN GLARGINE 18 UNITS: 100 INJECTION, SOLUTION SUBCUTANEOUS at 08:36

## 2019-01-01 RX ADMIN — MAGNESIUM OXIDE TAB 400 MG (241.3 MG ELEMENTAL MG) 400 MG: 400 (241.3 MG) TAB at 21:46

## 2019-01-01 RX ADMIN — HEPARIN SODIUM 5000 UNITS: 5000 INJECTION, SOLUTION INTRAVENOUS; SUBCUTANEOUS at 21:42

## 2019-01-01 RX ADMIN — ISOSORBIDE MONONITRATE 120 MG: 30 TABLET, EXTENDED RELEASE ORAL at 21:27

## 2019-01-01 RX ADMIN — INSULIN GLARGINE 9 UNITS: 100 INJECTION, SOLUTION SUBCUTANEOUS at 08:57

## 2019-01-01 RX ADMIN — PANTOPRAZOLE SODIUM 40 MG: 40 TABLET, DELAYED RELEASE ORAL at 13:09

## 2019-01-01 RX ADMIN — DAPSONE 100 MG: 100 TABLET ORAL at 21:57

## 2019-01-01 RX ADMIN — ACETAMINOPHEN 325 MG: 325 TABLET, FILM COATED ORAL at 16:36

## 2019-01-01 RX ADMIN — INSULIN ASPART 3 UNITS: 100 INJECTION, SOLUTION INTRAVENOUS; SUBCUTANEOUS at 12:58

## 2019-01-01 RX ADMIN — MAGNESIUM OXIDE TAB 400 MG (241.3 MG ELEMENTAL MG) 400 MG: 400 (241.3 MG) TAB at 21:48

## 2019-01-01 RX ADMIN — OMEGA-3-ACID ETHYL ESTERS 2 G: 1 CAPSULE, LIQUID FILLED ORAL at 22:00

## 2019-01-01 RX ADMIN — LIDOCAINE HYDROCHLORIDE 0.5 ML: 10 INJECTION, SOLUTION INFILTRATION; PERINEURAL at 15:25

## 2019-01-01 RX ADMIN — CARVEDILOL 12.5 MG: 12.5 TABLET, FILM COATED ORAL at 12:45

## 2019-01-01 RX ADMIN — ASPIRIN 81 MG: 81 TABLET ORAL at 20:43

## 2019-01-01 RX ADMIN — NITROGLYCERIN 2 MCG/KG/MIN: 20 INJECTION INTRAVENOUS at 17:23

## 2019-01-01 RX ADMIN — ERYTHROPOIETIN 15000 UNITS: 10000 INJECTION, SOLUTION INTRAVENOUS; SUBCUTANEOUS at 08:34

## 2019-01-01 RX ADMIN — ERYTHROPOIETIN 10000 UNITS: 10000 INJECTION, SOLUTION INTRAVENOUS; SUBCUTANEOUS at 15:24

## 2019-01-01 RX ADMIN — POTASSIUM CHLORIDE 20 MEQ: 1500 TABLET, EXTENDED RELEASE ORAL at 10:37

## 2019-01-01 RX ADMIN — DAPSONE 100 MG: 100 TABLET ORAL at 21:28

## 2019-01-01 RX ADMIN — OMEGA-3-ACID ETHYL ESTERS 2 G: 1 CAPSULE, LIQUID FILLED ORAL at 09:05

## 2019-01-01 RX ADMIN — INSULIN GLARGINE 6 UNITS: 100 INJECTION, SOLUTION SUBCUTANEOUS at 22:10

## 2019-01-01 RX ADMIN — ASPIRIN 81 MG: 81 TABLET ORAL at 20:04

## 2019-01-01 RX ADMIN — INSULIN GLARGINE 15 UNITS: 100 INJECTION, SOLUTION SUBCUTANEOUS at 13:36

## 2019-01-01 RX ADMIN — INSULIN ASPART 1 UNITS: 100 INJECTION, SOLUTION INTRAVENOUS; SUBCUTANEOUS at 14:28

## 2019-01-01 RX ADMIN — DOLUTEGRAVIR SODIUM 50 MG: 50 TABLET, FILM COATED ORAL at 21:46

## 2019-01-01 RX ADMIN — NITROGLYCERIN 0.07 MCG/KG/MIN: 20 INJECTION INTRAVENOUS at 04:23

## 2019-01-01 RX ADMIN — INSULIN ASPART 4 UNITS: 100 INJECTION, SOLUTION INTRAVENOUS; SUBCUTANEOUS at 09:09

## 2019-01-01 RX ADMIN — GABAPENTIN 300 MG: 300 CAPSULE ORAL at 12:31

## 2019-01-01 RX ADMIN — CARVEDILOL 25 MG: 25 TABLET, FILM COATED ORAL at 18:14

## 2019-01-01 RX ADMIN — CARVEDILOL 12.5 MG: 6.25 TABLET, FILM COATED ORAL at 17:25

## 2019-01-01 RX ADMIN — AMLODIPINE BESYLATE 5 MG: 5 TABLET ORAL at 10:33

## 2019-01-01 RX ADMIN — HEPARIN SODIUM 5000 UNITS: 5000 INJECTION, SOLUTION INTRAVENOUS; SUBCUTANEOUS at 13:35

## 2019-01-01 RX ADMIN — LABETALOL 20 MG/4 ML (5 MG/ML) INTRAVENOUS SYRINGE 5 MG: at 05:48

## 2019-01-01 RX ADMIN — NITROGLYCERIN 1.6 MCG/KG/MIN: 20 INJECTION INTRAVENOUS at 19:22

## 2019-01-01 RX ADMIN — AMLODIPINE BESYLATE 5 MG: 5 TABLET ORAL at 13:09

## 2019-01-01 RX ADMIN — LIDOCAINE HYDROCHLORIDE 0.5 ML: 10 INJECTION, SOLUTION INFILTRATION; PERINEURAL at 08:57

## 2019-01-01 RX ADMIN — HEPARIN SODIUM 500 UNITS/HR: 1000 INJECTION INTRAVENOUS; SUBCUTANEOUS at 08:59

## 2019-01-01 RX ADMIN — INSULIN ASPART 14 UNITS: 100 INJECTION, SOLUTION INTRAVENOUS; SUBCUTANEOUS at 10:33

## 2019-01-01 RX ADMIN — DOLUTEGRAVIR SODIUM 50 MG: 50 TABLET, FILM COATED ORAL at 21:28

## 2019-01-01 RX ADMIN — CARVEDILOL 12.5 MG: 6.25 TABLET, FILM COATED ORAL at 17:52

## 2019-01-01 RX ADMIN — GABAPENTIN 300 MG: 300 CAPSULE ORAL at 21:31

## 2019-01-01 RX ADMIN — ACETAMINOPHEN 325 MG: 325 TABLET, FILM COATED ORAL at 04:19

## 2019-01-01 RX ADMIN — AMLODIPINE BESYLATE 5 MG: 5 TABLET ORAL at 20:20

## 2019-01-01 RX ADMIN — CARVEDILOL 25 MG: 25 TABLET, FILM COATED ORAL at 18:45

## 2019-01-01 RX ADMIN — INSULIN ASPART 14 UNITS: 100 INJECTION, SOLUTION INTRAVENOUS; SUBCUTANEOUS at 14:43

## 2019-01-01 RX ADMIN — AMLODIPINE BESYLATE 5 MG: 5 TABLET ORAL at 21:24

## 2019-01-01 RX ADMIN — MAGNESIUM OXIDE TAB 400 MG (241.3 MG ELEMENTAL MG) 400 MG: 400 (241.3 MG) TAB at 21:18

## 2019-01-01 RX ADMIN — CLOPIDOGREL BISULFATE 75 MG: 75 TABLET, FILM COATED ORAL at 20:20

## 2019-01-01 RX ADMIN — CARVEDILOL 12.5 MG: 6.25 TABLET, FILM COATED ORAL at 09:18

## 2019-01-01 RX ADMIN — HYDRALAZINE HYDROCHLORIDE 10 MG: 20 INJECTION INTRAMUSCULAR; INTRAVENOUS at 16:38

## 2019-01-01 RX ADMIN — NITROGLYCERIN 0.4 MG: 0.4 TABLET SUBLINGUAL at 01:52

## 2019-01-01 RX ADMIN — DOLUTEGRAVIR SODIUM 50 MG: 50 TABLET, FILM COATED ORAL at 21:22

## 2019-01-01 RX ADMIN — CARVEDILOL 12.5 MG: 12.5 TABLET, FILM COATED ORAL at 18:47

## 2019-01-01 RX ADMIN — SODIUM CHLORIDE 200 ML: 9 INJECTION, SOLUTION INTRAVENOUS at 09:18

## 2019-01-01 RX ADMIN — INSULIN GLARGINE 6 UNITS: 100 INJECTION, SOLUTION SUBCUTANEOUS at 22:01

## 2019-01-01 RX ADMIN — PREDNISOLONE ACETATE 1 DROP: 10 SUSPENSION/ DROPS OPHTHALMIC at 13:04

## 2019-01-01 RX ADMIN — IRBESARTAN 300 MG: 300 TABLET ORAL at 22:01

## 2019-01-01 RX ADMIN — EPOETIN ALFA 15000 UNITS: 3000 SOLUTION INTRAVENOUS; SUBCUTANEOUS at 16:00

## 2019-01-01 RX ADMIN — CLONIDINE HYDROCHLORIDE 0.1 MG: 0.1 TABLET ORAL at 22:01

## 2019-01-01 RX ADMIN — ASPIRIN 81 MG: 81 TABLET, COATED ORAL at 09:42

## 2019-01-01 RX ADMIN — LABETALOL HYDROCHLORIDE 100 MG: 100 TABLET, FILM COATED ORAL at 23:37

## 2019-01-01 RX ADMIN — SODIUM CHLORIDE 250 ML: 9 INJECTION, SOLUTION INTRAVENOUS at 09:45

## 2019-01-01 RX ADMIN — OMEGA-3-ACID ETHYL ESTERS 2 G: 1 CAPSULE, LIQUID FILLED ORAL at 11:41

## 2019-01-01 RX ADMIN — ABACAVIR 600 MG: 300 TABLET, FILM COATED ORAL at 21:35

## 2019-01-01 RX ADMIN — DAPSONE 100 MG: 100 TABLET ORAL at 21:22

## 2019-01-01 RX ADMIN — HYDRALAZINE HYDROCHLORIDE 25 MG: 25 TABLET ORAL at 09:42

## 2019-01-01 RX ADMIN — Medication 5 MG: at 21:26

## 2019-01-01 RX ADMIN — ACETAMINOPHEN 650 MG: 325 TABLET, FILM COATED ORAL at 17:05

## 2019-01-01 RX ADMIN — CARVEDILOL 12.5 MG: 12.5 TABLET, FILM COATED ORAL at 08:57

## 2019-01-01 RX ADMIN — DEXTROSE MONOHYDRATE 25 ML: 500 INJECTION PARENTERAL at 17:27

## 2019-01-01 RX ADMIN — ERYTHROPOIETIN 15000 UNITS: 10000 INJECTION, SOLUTION INTRAVENOUS; SUBCUTANEOUS at 08:26

## 2019-01-01 RX ADMIN — OMEGA-3-ACID ETHYL ESTERS 2 G: 1 CAPSULE, LIQUID FILLED ORAL at 21:46

## 2019-01-01 RX ADMIN — CARVEDILOL 25 MG: 25 TABLET, FILM COATED ORAL at 17:05

## 2019-01-01 RX ADMIN — UMECLIDINIUM 1 PUFF: 62.5 AEROSOL, POWDER ORAL at 08:11

## 2019-01-01 RX ADMIN — ONDANSETRON 4 MG: 4 TABLET, ORALLY DISINTEGRATING ORAL at 09:24

## 2019-01-01 RX ADMIN — CARVEDILOL 12.5 MG: 12.5 TABLET, FILM COATED ORAL at 17:57

## 2019-01-01 RX ADMIN — MELATONIN 5 MG TABLET 5 MG: at 21:31

## 2019-01-01 RX ADMIN — Medication: at 15:25

## 2019-01-01 RX ADMIN — HEPARIN SODIUM 5000 UNITS: 5000 INJECTION, SOLUTION INTRAVENOUS; SUBCUTANEOUS at 21:08

## 2019-01-01 RX ADMIN — Medication 1 CAPSULE: at 21:48

## 2019-01-01 RX ADMIN — ONDANSETRON HYDROCHLORIDE 4 MG: 2 INJECTION, SOLUTION INTRAMUSCULAR; INTRAVENOUS at 03:48

## 2019-01-01 RX ADMIN — ISOSORBIDE MONONITRATE 120 MG: 60 TABLET, EXTENDED RELEASE ORAL at 20:04

## 2019-01-01 RX ADMIN — ISOSORBIDE MONONITRATE 120 MG: 60 TABLET, EXTENDED RELEASE ORAL at 21:36

## 2019-01-01 RX ADMIN — ISOSORBIDE MONONITRATE 120 MG: 60 TABLET, EXTENDED RELEASE ORAL at 20:39

## 2019-01-01 RX ADMIN — IRBESARTAN 300 MG: 300 TABLET ORAL at 21:56

## 2019-01-01 RX ADMIN — PANTOPRAZOLE SODIUM 40 MG: 40 TABLET, DELAYED RELEASE ORAL at 12:32

## 2019-01-01 RX ADMIN — CLONIDINE HYDROCHLORIDE 0.1 MG: 0.1 TABLET ORAL at 08:08

## 2019-01-01 RX ADMIN — INSULIN ASPART 1 UNITS: 100 INJECTION, SOLUTION INTRAVENOUS; SUBCUTANEOUS at 13:04

## 2019-01-01 RX ADMIN — MIRTAZAPINE 15 MG: 15 TABLET, FILM COATED ORAL at 21:26

## 2019-01-01 RX ADMIN — ISOSORBIDE MONONITRATE 120 MG: 60 TABLET, EXTENDED RELEASE ORAL at 00:53

## 2019-01-01 RX ADMIN — HEPARIN SODIUM 5000 UNITS: 5000 INJECTION, SOLUTION INTRAVENOUS; SUBCUTANEOUS at 23:38

## 2019-01-01 RX ADMIN — MIRTAZAPINE 15 MG: 15 TABLET, FILM COATED ORAL at 21:31

## 2019-01-01 RX ADMIN — ISOSORBIDE MONONITRATE 120 MG: 60 TABLET, EXTENDED RELEASE ORAL at 20:19

## 2019-01-01 RX ADMIN — OMEGA-3-ACID ETHYL ESTERS 2 G: 1 CAPSULE, LIQUID FILLED ORAL at 20:44

## 2019-01-01 RX ADMIN — INSULIN GLARGINE 9 UNITS: 100 INJECTION, SOLUTION SUBCUTANEOUS at 08:15

## 2019-01-01 RX ADMIN — ACETAMINOPHEN 650 MG: 325 TABLET, FILM COATED ORAL at 22:46

## 2019-01-01 RX ADMIN — AMLODIPINE BESYLATE 5 MG: 5 TABLET ORAL at 12:33

## 2019-01-01 RX ADMIN — ISOSORBIDE MONONITRATE 120 MG: 60 TABLET, EXTENDED RELEASE ORAL at 19:28

## 2019-01-01 RX ADMIN — NITROGLYCERIN 0.4 MG: 0.4 TABLET SUBLINGUAL at 10:25

## 2019-01-01 RX ADMIN — ATORVASTATIN CALCIUM 40 MG: 40 TABLET, FILM COATED ORAL at 21:48

## 2019-01-01 RX ADMIN — ACETAMINOPHEN 325 MG: 325 TABLET, FILM COATED ORAL at 21:44

## 2019-01-01 RX ADMIN — CLOPIDOGREL BISULFATE 75 MG: 75 TABLET, FILM COATED ORAL at 21:31

## 2019-01-01 RX ADMIN — MAGNESIUM OXIDE TAB 400 MG (241.3 MG ELEMENTAL MG) 400 MG: 400 (241.3 MG) TAB at 22:09

## 2019-01-01 RX ADMIN — PANTOPRAZOLE SODIUM 40 MG: 40 TABLET, DELAYED RELEASE ORAL at 08:57

## 2019-01-01 RX ADMIN — INSULIN ASPART 14 UNITS: 100 INJECTION, SOLUTION INTRAVENOUS; SUBCUTANEOUS at 18:44

## 2019-01-01 RX ADMIN — ISOSORBIDE MONONITRATE 120 MG: 60 TABLET, EXTENDED RELEASE ORAL at 21:08

## 2019-01-01 RX ADMIN — PANTOPRAZOLE SODIUM 40 MG: 40 TABLET, DELAYED RELEASE ORAL at 09:07

## 2019-01-01 RX ADMIN — ISOSORBIDE MONONITRATE 120 MG: 60 TABLET, EXTENDED RELEASE ORAL at 21:24

## 2019-01-01 RX ADMIN — OMEGA-3-ACID ETHYL ESTERS 2 G: 1 CAPSULE, LIQUID FILLED ORAL at 20:07

## 2019-01-01 RX ADMIN — IRBESARTAN 300 MG: 300 TABLET ORAL at 21:42

## 2019-01-01 RX ADMIN — SENNOSIDES AND DOCUSATE SODIUM 2 TABLET: 8.6; 5 TABLET ORAL at 21:41

## 2019-01-01 RX ADMIN — DOLUTEGRAVIR SODIUM 50 MG: 50 TABLET, FILM COATED ORAL at 22:33

## 2019-01-01 RX ADMIN — MAGNESIUM OXIDE TAB 400 MG (241.3 MG ELEMENTAL MG) 400 MG: 400 (241.3 MG) TAB at 21:57

## 2019-01-01 RX ADMIN — EPINEPHRINE 0.1 MG: 0.1 INJECTION INTRACARDIAC; INTRAVENOUS at 11:33

## 2019-01-01 RX ADMIN — HEPARIN SODIUM 500 UNITS: 1000 INJECTION INTRAVENOUS; SUBCUTANEOUS at 08:58

## 2019-01-01 RX ADMIN — MAGNESIUM OXIDE TAB 400 MG (241.3 MG ELEMENTAL MG) 400 MG: 400 (241.3 MG) TAB at 21:22

## 2019-01-01 RX ADMIN — DOLUTEGRAVIR SODIUM 50 MG: 50 TABLET, FILM COATED ORAL at 21:30

## 2019-01-01 RX ADMIN — ABACAVIR 600 MG: 300 TABLET, FILM COATED ORAL at 20:51

## 2019-01-01 RX ADMIN — MAGNESIUM OXIDE TAB 400 MG (241.3 MG ELEMENTAL MG) 400 MG: 400 (241.3 MG) TAB at 20:24

## 2019-01-01 RX ADMIN — FUROSEMIDE 60 MG: 10 INJECTION, SOLUTION INTRAVENOUS at 06:20

## 2019-01-01 RX ADMIN — PANTOPRAZOLE SODIUM 40 MG: 40 TABLET, DELAYED RELEASE ORAL at 08:09

## 2019-01-01 RX ADMIN — ASPIRIN 81 MG: 81 TABLET, COATED ORAL at 09:18

## 2019-01-01 RX ADMIN — HYDRALAZINE HYDROCHLORIDE 10 MG: 20 INJECTION INTRAMUSCULAR; INTRAVENOUS at 03:25

## 2019-01-01 RX ADMIN — Medication 1 CAPSULE: at 21:46

## 2019-01-01 RX ADMIN — HEPARIN SODIUM 5000 UNITS: 5000 INJECTION, SOLUTION INTRAVENOUS; SUBCUTANEOUS at 12:33

## 2019-01-01 RX ADMIN — LABETALOL HYDROCHLORIDE 100 MG: 100 TABLET, FILM COATED ORAL at 17:12

## 2019-01-01 RX ADMIN — EPINEPHRINE 0.1 MG: 0.1 INJECTION INTRACARDIAC; INTRAVENOUS at 11:45

## 2019-01-01 RX ADMIN — INSULIN GLARGINE 18 UNITS: 100 INJECTION, SOLUTION SUBCUTANEOUS at 12:39

## 2019-01-01 RX ADMIN — ABACAVIR 600 MG: 300 TABLET, FILM COATED ORAL at 21:25

## 2019-01-01 RX ADMIN — CALCIUM GLUCONATE 2 G: 98 INJECTION, SOLUTION INTRAVENOUS at 08:36

## 2019-01-01 RX ADMIN — CARVEDILOL 12.5 MG: 12.5 TABLET, FILM COATED ORAL at 10:48

## 2019-01-01 RX ADMIN — SODIUM CHLORIDE 200 ML: 9 INJECTION, SOLUTION INTRAVENOUS at 14:42

## 2019-01-01 RX ADMIN — MIDAZOLAM HYDROCHLORIDE 2 MG: 1 INJECTION, SOLUTION INTRAMUSCULAR; INTRAVENOUS at 11:03

## 2019-01-01 RX ADMIN — ACETAMINOPHEN 325 MG: 325 TABLET, FILM COATED ORAL at 12:13

## 2019-01-01 RX ADMIN — PANTOPRAZOLE SODIUM 40 MG: 40 TABLET, DELAYED RELEASE ORAL at 08:40

## 2019-01-01 RX ADMIN — HEPARIN SODIUM 5000 UNITS: 5000 INJECTION, SOLUTION INTRAVENOUS; SUBCUTANEOUS at 10:56

## 2019-01-01 RX ADMIN — CLONAZEPAM 0.5 MG: 0.5 TABLET ORAL at 20:42

## 2019-01-01 RX ADMIN — ATORVASTATIN CALCIUM 40 MG: 40 TABLET, FILM COATED ORAL at 21:25

## 2019-01-01 RX ADMIN — NITROGLYCERIN 0.4 MG: 0.4 TABLET SUBLINGUAL at 08:48

## 2019-01-01 RX ADMIN — RITONAVIR 100 MG: 100 TABLET, FILM COATED ORAL at 21:31

## 2019-01-01 RX ADMIN — CARVEDILOL 12.5 MG: 12.5 TABLET, FILM COATED ORAL at 18:25

## 2019-01-01 RX ADMIN — DOLUTEGRAVIR SODIUM 50 MG: 50 TABLET, FILM COATED ORAL at 22:00

## 2019-01-01 RX ADMIN — GABAPENTIN 300 MG: 300 CAPSULE ORAL at 20:20

## 2019-01-01 RX ADMIN — OMEGA-3-ACID ETHYL ESTERS 2 G: 1 CAPSULE, LIQUID FILLED ORAL at 08:40

## 2019-01-01 RX ADMIN — PANTOPRAZOLE SODIUM 40 MG: 40 TABLET, DELAYED RELEASE ORAL at 09:44

## 2019-01-01 RX ADMIN — ACETAMINOPHEN 325 MG: 325 TABLET, FILM COATED ORAL at 21:18

## 2019-01-01 RX ADMIN — PREDNISOLONE ACETATE 1 DROP: 10 SUSPENSION/ DROPS OPHTHALMIC at 16:59

## 2019-01-01 RX ADMIN — MOXIFLOXACIN HYDROCHLORIDE 1 DROP: 5 SOLUTION/ DROPS OPHTHALMIC at 18:31

## 2019-01-01 RX ADMIN — PANTOPRAZOLE SODIUM 40 MG: 40 TABLET, DELAYED RELEASE ORAL at 12:44

## 2019-01-01 RX ADMIN — DARUNAVIR 800 MG: 800 TABLET, FILM COATED ORAL at 21:27

## 2019-01-01 RX ADMIN — SODIUM CHLORIDE 200 ML: 9 INJECTION, SOLUTION INTRAVENOUS at 15:22

## 2019-01-01 RX ADMIN — OMEGA-3-ACID ETHYL ESTERS 2 G: 1 CAPSULE, LIQUID FILLED ORAL at 10:32

## 2019-01-01 RX ADMIN — CARVEDILOL 12.5 MG: 12.5 TABLET, FILM COATED ORAL at 18:38

## 2019-01-01 RX ADMIN — VANCOMYCIN HYDROCHLORIDE 1750 MG: 5 INJECTION, POWDER, LYOPHILIZED, FOR SOLUTION INTRAVENOUS at 12:18

## 2019-01-01 RX ADMIN — ISOSORBIDE MONONITRATE 120 MG: 60 TABLET, EXTENDED RELEASE ORAL at 21:55

## 2019-01-01 RX ADMIN — CARVEDILOL 25 MG: 25 TABLET, FILM COATED ORAL at 13:09

## 2019-01-01 RX ADMIN — ATORVASTATIN CALCIUM 40 MG: 40 TABLET, FILM COATED ORAL at 22:01

## 2019-01-01 RX ADMIN — INSULIN ASPART 1 UNITS: 100 INJECTION, SOLUTION INTRAVENOUS; SUBCUTANEOUS at 17:59

## 2019-01-01 RX ADMIN — Medication 1 CAPSULE: at 21:57

## 2019-01-01 RX ADMIN — NITROGLYCERIN 2 MCG/KG/MIN: 20 INJECTION INTRAVENOUS at 06:01

## 2019-01-01 RX ADMIN — SODIUM CHLORIDE 250 ML: 9 INJECTION, SOLUTION INTRAVENOUS at 11:55

## 2019-01-01 RX ADMIN — CARVEDILOL 12.5 MG: 12.5 TABLET, FILM COATED ORAL at 17:34

## 2019-01-01 RX ADMIN — DOLUTEGRAVIR SODIUM 50 MG: 50 TABLET, FILM COATED ORAL at 21:57

## 2019-01-01 RX ADMIN — EPINEPHRINE 0.1 MG: 0.1 INJECTION INTRACARDIAC; INTRAVENOUS at 11:18

## 2019-01-01 RX ADMIN — ASPIRIN 325 MG ORAL TABLET 325 MG: 325 PILL ORAL at 01:51

## 2019-01-01 RX ADMIN — INSULIN GLARGINE 18 UNITS: 100 INJECTION, SOLUTION SUBCUTANEOUS at 20:19

## 2019-01-01 RX ADMIN — GABAPENTIN 300 MG: 300 CAPSULE ORAL at 21:25

## 2019-01-01 RX ADMIN — CARVEDILOL 25 MG: 25 TABLET, FILM COATED ORAL at 10:56

## 2019-01-01 RX ADMIN — SODIUM CHLORIDE 300 ML: 9 INJECTION, SOLUTION INTRAVENOUS at 15:23

## 2019-01-01 RX ADMIN — GABAPENTIN 300 MG: 300 CAPSULE ORAL at 09:42

## 2019-01-01 RX ADMIN — INSULIN GLARGINE 15 UNITS: 100 INJECTION, SOLUTION SUBCUTANEOUS at 21:57

## 2019-01-01 RX ADMIN — GABAPENTIN 300 MG: 300 CAPSULE ORAL at 09:05

## 2019-01-01 RX ADMIN — SODIUM CHLORIDE 300 ML: 9 INJECTION, SOLUTION INTRAVENOUS at 10:50

## 2019-01-01 RX ADMIN — ASPIRIN 81 MG: 81 TABLET ORAL at 19:28

## 2019-01-01 RX ADMIN — HYDROCODONE BITARTRATE AND ACETAMINOPHEN 1 TABLET: 5; 325 TABLET ORAL at 13:02

## 2019-01-01 RX ADMIN — MIRTAZAPINE 15 MG: 15 TABLET, FILM COATED ORAL at 21:42

## 2019-01-01 RX ADMIN — SODIUM CHLORIDE 200 ML: 9 INJECTION, SOLUTION INTRAVENOUS at 06:55

## 2019-01-01 RX ADMIN — MAGNESIUM OXIDE TAB 400 MG (241.3 MG ELEMENTAL MG) 400 MG: 400 (241.3 MG) TAB at 21:36

## 2019-01-01 RX ADMIN — INSULIN GLARGINE 15 UNITS: 100 INJECTION, SOLUTION SUBCUTANEOUS at 08:47

## 2019-01-01 RX ADMIN — ASPIRIN 81 MG: 81 TABLET, COATED ORAL at 00:56

## 2019-01-01 RX ADMIN — AMLODIPINE BESYLATE 5 MG: 5 TABLET ORAL at 20:38

## 2019-01-01 RX ADMIN — ASPIRIN 81 MG: 81 TABLET ORAL at 20:38

## 2019-01-01 RX ADMIN — MIDAZOLAM HYDROCHLORIDE 2 MG: 1 INJECTION, SOLUTION INTRAMUSCULAR; INTRAVENOUS at 11:27

## 2019-01-01 RX ADMIN — AMLODIPINE BESYLATE 5 MG: 5 TABLET ORAL at 10:11

## 2019-01-01 RX ADMIN — HEPARIN SODIUM 5000 UNITS: 5000 INJECTION, SOLUTION INTRAVENOUS; SUBCUTANEOUS at 11:40

## 2019-01-01 RX ADMIN — INSULIN ASPART 2 UNITS: 100 INJECTION, SOLUTION INTRAVENOUS; SUBCUTANEOUS at 14:22

## 2019-01-01 RX ADMIN — HEPARIN SODIUM 5000 UNITS: 5000 INJECTION, SOLUTION INTRAVENOUS; SUBCUTANEOUS at 20:19

## 2019-01-01 RX ADMIN — CLONIDINE HYDROCHLORIDE 0.1 MG: 0.1 TABLET ORAL at 21:37

## 2019-01-01 RX ADMIN — ERYTHROPOIETIN 15000 UNITS: 10000 INJECTION, SOLUTION INTRAVENOUS; SUBCUTANEOUS at 16:23

## 2019-01-01 RX ADMIN — DOXERCALCIFEROL 2 MCG: 4 INJECTION, SOLUTION INTRAVENOUS at 08:25

## 2019-01-01 RX ADMIN — SODIUM CHLORIDE 300 ML: 9 INJECTION, SOLUTION INTRAVENOUS at 08:56

## 2019-01-01 RX ADMIN — RITONAVIR 100 MG: 100 TABLET, FILM COATED ORAL at 21:52

## 2019-01-01 RX ADMIN — DOXERCALCIFEROL 2 MCG: 4 INJECTION, SOLUTION INTRAVENOUS at 08:33

## 2019-01-01 RX ADMIN — ASPIRIN 81 MG: 81 TABLET ORAL at 22:07

## 2019-01-01 RX ADMIN — DOLUTEGRAVIR SODIUM 50 MG: 50 TABLET, FILM COATED ORAL at 21:48

## 2019-01-01 RX ADMIN — HYDRALAZINE HYDROCHLORIDE 25 MG: 25 TABLET ORAL at 08:43

## 2019-01-01 RX ADMIN — INSULIN GLARGINE 18 UNITS: 100 INJECTION, SOLUTION SUBCUTANEOUS at 10:32

## 2019-01-01 RX ADMIN — IRBESARTAN 300 MG: 150 TABLET ORAL at 00:56

## 2019-01-01 RX ADMIN — HEPARIN SODIUM 5000 UNITS: 5000 INJECTION, SOLUTION INTRAVENOUS; SUBCUTANEOUS at 21:21

## 2019-01-01 RX ADMIN — Medication 3.3 MILLICURIE: at 13:36

## 2019-01-01 RX ADMIN — DARUNAVIR 800 MG: 800 TABLET, FILM COATED ORAL at 22:33

## 2019-01-01 RX ADMIN — INSULIN ASPART 1 UNITS: 100 INJECTION, SOLUTION INTRAVENOUS; SUBCUTANEOUS at 17:34

## 2019-01-01 RX ADMIN — CLONIDINE HYDROCHLORIDE 0.1 MG: 0.1 TABLET ORAL at 09:07

## 2019-01-01 RX ADMIN — KETOROLAC TROMETHAMINE 1 DROP: 5 SOLUTION OPHTHALMIC at 13:04

## 2019-01-01 RX ADMIN — HEPARIN SODIUM 5000 UNITS: 5000 INJECTION, SOLUTION INTRAVENOUS; SUBCUTANEOUS at 00:40

## 2019-01-01 RX ADMIN — DARUNAVIR 800 MG: 800 TABLET, FILM COATED ORAL at 21:30

## 2019-01-01 RX ADMIN — ETOMIDATE 20 MG: 2 INJECTION, SOLUTION INTRAVENOUS at 08:58

## 2019-01-01 RX ADMIN — INSULIN GLARGINE 6 UNITS: 100 INJECTION, SOLUTION SUBCUTANEOUS at 22:04

## 2019-01-01 RX ADMIN — CARVEDILOL 25 MG: 25 TABLET, FILM COATED ORAL at 10:32

## 2019-01-01 RX ADMIN — Medication 1 CAPSULE: at 21:22

## 2019-01-01 RX ADMIN — AMLODIPINE BESYLATE 5 MG: 5 TABLET ORAL at 21:36

## 2019-01-01 RX ADMIN — POLYETHYLENE GLYCOL 3350 17 G: 17 POWDER, FOR SOLUTION ORAL at 09:24

## 2019-01-01 RX ADMIN — Medication 5 MG: at 22:32

## 2019-01-01 RX ADMIN — ACETAMINOPHEN 650 MG: 325 TABLET, FILM COATED ORAL at 22:45

## 2019-01-01 RX ADMIN — INSULIN ASPART 1 UNITS: 100 INJECTION, SOLUTION INTRAVENOUS; SUBCUTANEOUS at 21:35

## 2019-01-01 RX ADMIN — SODIUM CHLORIDE 300 ML: 9 INJECTION, SOLUTION INTRAVENOUS at 09:30

## 2019-01-01 RX ADMIN — SODIUM CHLORIDE 300 ML: 9 INJECTION, SOLUTION INTRAVENOUS at 11:41

## 2019-01-01 RX ADMIN — PANTOPRAZOLE SODIUM 40 MG: 40 TABLET, DELAYED RELEASE ORAL at 14:33

## 2019-01-01 RX ADMIN — HEPARIN SODIUM 5000 UNITS: 5000 INJECTION, SOLUTION INTRAVENOUS; SUBCUTANEOUS at 09:07

## 2019-01-01 RX ADMIN — ASPIRIN 81 MG: 81 TABLET, COATED ORAL at 21:28

## 2019-01-01 RX ADMIN — OMEGA-3-ACID ETHYL ESTERS 2 G: 1 CAPSULE, LIQUID FILLED ORAL at 08:57

## 2019-01-01 RX ADMIN — ISOSORBIDE MONONITRATE 120 MG: 60 TABLET, EXTENDED RELEASE ORAL at 21:31

## 2019-01-01 RX ADMIN — ABACAVIR 600 MG: 300 TABLET, FILM COATED ORAL at 20:40

## 2019-01-01 RX ADMIN — INSULIN ASPART 3 UNITS: 100 INJECTION, SOLUTION INTRAVENOUS; SUBCUTANEOUS at 13:21

## 2019-01-01 RX ADMIN — MIRTAZAPINE 15 MG: 15 TABLET, FILM COATED ORAL at 22:33

## 2019-01-01 RX ADMIN — PREDNISOLONE ACETATE 1 DROP: 10 SUSPENSION/ DROPS OPHTHALMIC at 18:26

## 2019-01-01 RX ADMIN — IRBESARTAN 300 MG: 150 TABLET ORAL at 22:34

## 2019-01-01 RX ADMIN — CARVEDILOL 25 MG: 25 TABLET, FILM COATED ORAL at 10:11

## 2019-01-01 RX ADMIN — DAPSONE 100 MG: 100 TABLET ORAL at 20:23

## 2019-01-01 RX ADMIN — CARVEDILOL 25 MG: 25 TABLET, FILM COATED ORAL at 18:28

## 2019-01-01 RX ADMIN — ONDANSETRON 4 MG: 2 INJECTION INTRAMUSCULAR; INTRAVENOUS at 03:48

## 2019-01-01 RX ADMIN — TAZOBACTAM SODIUM AND PIPERACILLIN SODIUM 3.38 G: 375; 3 INJECTION, SOLUTION INTRAVENOUS at 11:09

## 2019-01-01 RX ADMIN — FUROSEMIDE 80 MG: 10 INJECTION, SOLUTION INTRAVENOUS at 04:17

## 2019-01-01 RX ADMIN — PANTOPRAZOLE SODIUM 40 MG: 40 TABLET, DELAYED RELEASE ORAL at 09:42

## 2019-01-01 RX ADMIN — ATORVASTATIN CALCIUM 40 MG: 40 TABLET, FILM COATED ORAL at 20:23

## 2019-01-01 RX ADMIN — HEPARIN SODIUM 5000 UNITS: 5000 INJECTION, SOLUTION INTRAVENOUS; SUBCUTANEOUS at 08:40

## 2019-01-01 RX ADMIN — HEPARIN SODIUM 5000 UNITS: 5000 INJECTION, SOLUTION INTRAVENOUS; SUBCUTANEOUS at 21:36

## 2019-01-01 RX ADMIN — INSULIN GLARGINE 18 UNITS: 100 INJECTION, SOLUTION SUBCUTANEOUS at 20:39

## 2019-01-01 RX ADMIN — HEPARIN SODIUM 950 UNITS/HR: 10000 INJECTION, SOLUTION INTRAVENOUS at 00:58

## 2019-01-01 RX ADMIN — AMLODIPINE BESYLATE 5 MG: 5 TABLET ORAL at 08:39

## 2019-01-01 RX ADMIN — KETOROLAC TROMETHAMINE 1 DROP: 5 SOLUTION OPHTHALMIC at 09:39

## 2019-01-01 RX ADMIN — HEPARIN SODIUM 1000 UNITS/HR: 10000 INJECTION, SOLUTION INTRAVENOUS at 08:44

## 2019-01-01 RX ADMIN — ASPIRIN 81 MG: 81 TABLET ORAL at 21:46

## 2019-01-01 RX ADMIN — ACETAMINOPHEN 650 MG: 325 TABLET, FILM COATED ORAL at 14:50

## 2019-01-01 RX ADMIN — OMEGA-3-ACID ETHYL ESTERS 2 G: 1 CAPSULE, LIQUID FILLED ORAL at 14:31

## 2019-01-01 RX ADMIN — PANTOPRAZOLE SODIUM 40 MG: 40 TABLET, DELAYED RELEASE ORAL at 11:41

## 2019-01-01 RX ADMIN — CINACALCET HYDROCHLORIDE 60 MG: 30 TABLET, FILM COATED ORAL at 16:32

## 2019-01-01 RX ADMIN — GABAPENTIN 300 MG: 300 CAPSULE ORAL at 13:30

## 2019-01-01 RX ADMIN — ABACAVIR 600 MG: 300 TABLET, FILM COATED ORAL at 21:46

## 2019-01-01 RX ADMIN — ASPIRIN 81 MG: 81 TABLET ORAL at 21:36

## 2019-01-01 RX ADMIN — DAPSONE 100 MG: 100 TABLET ORAL at 21:56

## 2019-01-01 RX ADMIN — CLONIDINE HYDROCHLORIDE 0.1 MG: 0.1 TABLET ORAL at 10:32

## 2019-01-01 RX ADMIN — EPOETIN ALFA 10000 UNITS: 10000 SOLUTION INTRAVENOUS; SUBCUTANEOUS at 09:22

## 2019-01-01 RX ADMIN — DAPSONE 100 MG: 100 TABLET ORAL at 21:31

## 2019-01-01 RX ADMIN — SODIUM CHLORIDE 300 ML: 9 INJECTION, SOLUTION INTRAVENOUS at 09:45

## 2019-01-01 RX ADMIN — DAPSONE 100 MG: 100 TABLET ORAL at 22:01

## 2019-01-01 RX ADMIN — MIDAZOLAM HYDROCHLORIDE 2 MG: 1 INJECTION, SOLUTION INTRAMUSCULAR; INTRAVENOUS at 11:49

## 2019-01-01 RX ADMIN — DOXERCALCIFEROL 2 MCG: 4 INJECTION, SOLUTION INTRAVENOUS at 15:24

## 2019-01-01 RX ADMIN — FUROSEMIDE 80 MG: 10 INJECTION, SOLUTION INTRAMUSCULAR; INTRAVENOUS at 07:47

## 2019-01-01 RX ADMIN — INSULIN GLARGINE 18 UNITS: 100 INJECTION, SOLUTION SUBCUTANEOUS at 14:22

## 2019-01-01 RX ADMIN — IRBESARTAN 300 MG: 300 TABLET ORAL at 21:25

## 2019-01-01 RX ADMIN — INSULIN GLARGINE 15 UNITS: 100 INJECTION, SOLUTION SUBCUTANEOUS at 13:22

## 2019-01-01 RX ADMIN — CARVEDILOL 12.5 MG: 12.5 TABLET, FILM COATED ORAL at 09:42

## 2019-01-01 RX ADMIN — CLONIDINE HYDROCHLORIDE 0.1 MG: 0.1 TABLET ORAL at 12:31

## 2019-01-01 RX ADMIN — DOLUTEGRAVIR SODIUM 50 MG: 50 TABLET, FILM COATED ORAL at 21:21

## 2019-01-01 RX ADMIN — OMEGA-3-ACID ETHYL ESTERS 2 G: 1 CAPSULE, LIQUID FILLED ORAL at 21:56

## 2019-01-01 RX ADMIN — AMLODIPINE BESYLATE 5 MG: 5 TABLET ORAL at 09:44

## 2019-01-01 RX ADMIN — ATORVASTATIN CALCIUM 40 MG: 40 TABLET, FILM COATED ORAL at 21:42

## 2019-01-01 RX ADMIN — NITROGLYCERIN 0.4 MG: 0.4 TABLET SUBLINGUAL at 07:38

## 2019-01-01 RX ADMIN — OMEGA-3-ACID ETHYL ESTERS 2 G: 1 CAPSULE, LIQUID FILLED ORAL at 21:24

## 2019-01-01 RX ADMIN — INSULIN GLARGINE 6 UNITS: 100 INJECTION, SOLUTION SUBCUTANEOUS at 21:31

## 2019-01-01 RX ADMIN — IRBESARTAN 300 MG: 150 TABLET ORAL at 21:30

## 2019-01-01 RX ADMIN — DOXERCALCIFEROL 2 MCG: 4 INJECTION, SOLUTION INTRAVENOUS at 09:27

## 2019-01-01 RX ADMIN — AMLODIPINE BESYLATE 5 MG: 5 TABLET ORAL at 21:28

## 2019-01-01 RX ADMIN — CARVEDILOL 25 MG: 25 TABLET, FILM COATED ORAL at 17:39

## 2019-01-01 RX ADMIN — CLONIDINE HYDROCHLORIDE 0.1 MG: 0.1 TABLET ORAL at 20:04

## 2019-01-01 RX ADMIN — ABACAVIR 600 MG: 300 TABLET, FILM COATED ORAL at 20:22

## 2019-01-01 RX ADMIN — KETOROLAC TROMETHAMINE 1 DROP: 5 SOLUTION OPHTHALMIC at 21:32

## 2019-01-01 RX ADMIN — DOXERCALCIFEROL 4 MCG: 4 INJECTION, SOLUTION INTRAVENOUS at 10:47

## 2019-01-01 RX ADMIN — ACETAMINOPHEN 325 MG: 325 TABLET, FILM COATED ORAL at 23:47

## 2019-01-01 RX ADMIN — DEXTROSE 15 G: 15 GEL ORAL at 04:13

## 2019-01-01 RX ADMIN — AMLODIPINE BESYLATE 5 MG: 5 TABLET ORAL at 21:52

## 2019-01-01 RX ADMIN — ABACAVIR 600 MG: 300 TABLET, FILM COATED ORAL at 20:09

## 2019-01-01 RX ADMIN — GABAPENTIN 300 MG: 300 CAPSULE ORAL at 09:43

## 2019-01-01 RX ADMIN — OMEGA-3-ACID ETHYL ESTERS 2 G: 1 CAPSULE, LIQUID FILLED ORAL at 13:08

## 2019-01-01 RX ADMIN — HEPARIN SODIUM 5000 UNITS: 5000 INJECTION, SOLUTION INTRAVENOUS; SUBCUTANEOUS at 10:33

## 2019-01-01 RX ADMIN — DARUNAVIR 800 MG: 800 TABLET, FILM COATED ORAL at 21:25

## 2019-01-01 RX ADMIN — Medication 5 MG: at 21:37

## 2019-01-01 RX ADMIN — DAPSONE 100 MG: 100 TABLET ORAL at 22:09

## 2019-01-01 RX ADMIN — AMLODIPINE BESYLATE 5 MG: 5 TABLET ORAL at 10:56

## 2019-01-01 RX ADMIN — INSULIN ASPART 2 UNITS: 100 INJECTION, SOLUTION INTRAVENOUS; SUBCUTANEOUS at 14:13

## 2019-01-01 RX ADMIN — ACETAMINOPHEN 1000 MG: 500 TABLET ORAL at 05:19

## 2019-01-01 RX ADMIN — CLOPIDOGREL BISULFATE 75 MG: 75 TABLET, FILM COATED ORAL at 19:30

## 2019-01-01 RX ADMIN — ATORVASTATIN CALCIUM 40 MG: 40 TABLET, FILM COATED ORAL at 21:57

## 2019-01-01 RX ADMIN — OMEGA-3-ACID ETHYL ESTERS 2 G: 1 CAPSULE, LIQUID FILLED ORAL at 21:08

## 2019-01-01 RX ADMIN — CARVEDILOL 12.5 MG: 12.5 TABLET, FILM COATED ORAL at 08:43

## 2019-01-01 RX ADMIN — DAPSONE 100 MG: 100 TABLET ORAL at 21:48

## 2019-01-01 RX ADMIN — HYDROCODONE BITARTRATE AND ACETAMINOPHEN 1 TABLET: 5; 325 TABLET ORAL at 19:28

## 2019-01-01 RX ADMIN — AMLODIPINE BESYLATE 5 MG: 5 TABLET ORAL at 08:09

## 2019-01-01 RX ADMIN — ISOSORBIDE MONONITRATE 120 MG: 60 TABLET, EXTENDED RELEASE ORAL at 20:22

## 2019-01-01 RX ADMIN — ACETAMINOPHEN 325 MG: 325 TABLET, FILM COATED ORAL at 18:00

## 2019-01-01 RX ADMIN — CARVEDILOL 12.5 MG: 12.5 TABLET, FILM COATED ORAL at 06:16

## 2019-01-01 RX ADMIN — INSULIN GLARGINE 9 UNITS: 100 INJECTION, SOLUTION SUBCUTANEOUS at 08:00

## 2019-01-01 RX ADMIN — ERYTHROPOIETIN 15000 UNITS: 10000 INJECTION, SOLUTION INTRAVENOUS; SUBCUTANEOUS at 08:56

## 2019-01-01 RX ADMIN — ATORVASTATIN CALCIUM 40 MG: 40 TABLET, FILM COATED ORAL at 21:19

## 2019-01-01 RX ADMIN — IPRATROPIUM BROMIDE AND ALBUTEROL SULFATE 3 ML: .5; 3 SOLUTION RESPIRATORY (INHALATION) at 20:47

## 2019-01-01 RX ADMIN — Medication 1 CAPSULE: at 22:01

## 2019-01-01 RX ADMIN — KETOROLAC TROMETHAMINE 1 DROP: 5 SOLUTION OPHTHALMIC at 18:27

## 2019-01-01 RX ADMIN — MIRTAZAPINE 15 MG: 15 TABLET, FILM COATED ORAL at 21:20

## 2019-01-01 RX ADMIN — PANTOPRAZOLE SODIUM 40 MG: 40 TABLET, DELAYED RELEASE ORAL at 10:02

## 2019-01-01 RX ADMIN — AMLODIPINE BESYLATE 5 MG: 5 TABLET ORAL at 22:06

## 2019-01-01 RX ADMIN — CINACALCET HYDROCHLORIDE 30 MG: 30 TABLET, FILM COATED ORAL at 10:25

## 2019-01-01 RX ADMIN — OMEGA-3-ACID ETHYL ESTERS 2 G: 1 CAPSULE, LIQUID FILLED ORAL at 20:51

## 2019-01-01 RX ADMIN — PANTOPRAZOLE SODIUM 40 MG: 40 TABLET, DELAYED RELEASE ORAL at 10:48

## 2019-01-01 RX ADMIN — CARVEDILOL 25 MG: 25 TABLET, FILM COATED ORAL at 08:09

## 2019-01-01 RX ADMIN — DOLUTEGRAVIR SODIUM 50 MG: 50 TABLET, FILM COATED ORAL at 21:25

## 2019-01-01 RX ADMIN — ABACAVIR SULFATE 600 MG: 300 TABLET, FILM COATED ORAL at 21:28

## 2019-01-01 RX ADMIN — DAPSONE 100 MG: 100 TABLET ORAL at 21:47

## 2019-01-01 RX ADMIN — CLOPIDOGREL BISULFATE 75 MG: 75 TABLET, FILM COATED ORAL at 21:28

## 2019-01-01 RX ADMIN — ASPIRIN 81 MG: 81 TABLET, COATED ORAL at 08:21

## 2019-01-01 RX ADMIN — MIDAZOLAM 2 MG: 1 INJECTION INTRAMUSCULAR; INTRAVENOUS at 09:13

## 2019-01-01 RX ADMIN — ATORVASTATIN CALCIUM 40 MG: 40 TABLET, FILM COATED ORAL at 21:31

## 2019-01-01 RX ADMIN — Medication 1 CAPSULE: at 20:24

## 2019-01-01 RX ADMIN — SODIUM CHLORIDE 250 ML: 9 INJECTION, SOLUTION INTRAVENOUS at 09:31

## 2019-01-01 RX ADMIN — CARVEDILOL 25 MG: 25 TABLET, FILM COATED ORAL at 09:06

## 2019-01-01 RX ADMIN — INSULIN ASPART 3 UNITS: 100 INJECTION, SOLUTION INTRAVENOUS; SUBCUTANEOUS at 17:52

## 2019-01-01 RX ADMIN — ABACAVIR 600 MG: 300 TABLET, FILM COATED ORAL at 20:06

## 2019-01-01 RX ADMIN — HYDRALAZINE HYDROCHLORIDE 10 MG: 20 INJECTION INTRAMUSCULAR; INTRAVENOUS at 00:17

## 2019-01-01 RX ADMIN — ERYTHROPOIETIN 15000 UNITS: 10000 INJECTION, SOLUTION INTRAVENOUS; SUBCUTANEOUS at 15:12

## 2019-01-01 RX ADMIN — HEPARIN SODIUM 5000 UNITS: 5000 INJECTION, SOLUTION INTRAVENOUS; SUBCUTANEOUS at 13:28

## 2019-01-01 RX ADMIN — EPOETIN ALFA 10000 UNITS: 10000 SOLUTION INTRAVENOUS; SUBCUTANEOUS at 11:40

## 2019-01-01 RX ADMIN — Medication 4000 UNITS: at 12:43

## 2019-01-01 RX ADMIN — INSULIN GLARGINE 9 UNITS: 100 INJECTION, SOLUTION SUBCUTANEOUS at 08:14

## 2019-01-01 RX ADMIN — ABACAVIR SULFATE 600 MG: 300 TABLET, FILM COATED ORAL at 20:20

## 2019-01-01 RX ADMIN — DOXERCALCIFEROL 2 MCG: 4 INJECTION, SOLUTION INTRAVENOUS at 08:56

## 2019-01-01 RX ADMIN — ERYTHROPOIETIN 15000 UNITS: 10000 INJECTION, SOLUTION INTRAVENOUS; SUBCUTANEOUS at 09:28

## 2019-01-01 RX ADMIN — OMEGA-3-ACID ETHYL ESTERS 2 G: 1 CAPSULE, LIQUID FILLED ORAL at 09:07

## 2019-01-01 RX ADMIN — AMLODIPINE BESYLATE 5 MG: 5 TABLET ORAL at 09:07

## 2019-01-01 RX ADMIN — ATORVASTATIN CALCIUM 40 MG: 40 TABLET, FILM COATED ORAL at 22:31

## 2019-01-01 RX ADMIN — INSULIN ASPART 1 UNITS: 100 INJECTION, SOLUTION INTRAVENOUS; SUBCUTANEOUS at 18:33

## 2019-01-01 RX ADMIN — RITONAVIR 100 MG: 100 TABLET, FILM COATED ORAL at 22:33

## 2019-01-01 RX ADMIN — ASPIRIN 81 MG: 81 TABLET, COATED ORAL at 08:42

## 2019-01-01 RX ADMIN — SODIUM CHLORIDE 200 ML: 9 INJECTION, SOLUTION INTRAVENOUS at 14:41

## 2019-01-01 RX ADMIN — HEPARIN SODIUM 5000 UNITS: 5000 INJECTION, SOLUTION INTRAVENOUS; SUBCUTANEOUS at 12:45

## 2019-01-01 RX ADMIN — ABACAVIR SULFATE 600 MG: 300 TABLET, FILM COATED ORAL at 21:30

## 2019-01-01 RX ADMIN — DAPSONE 100 MG: 100 TABLET ORAL at 22:34

## 2019-01-01 RX ADMIN — INSULIN ASPART 1 UNITS: 100 INJECTION, SOLUTION INTRAVENOUS; SUBCUTANEOUS at 09:18

## 2019-01-01 RX ADMIN — ONDANSETRON HYDROCHLORIDE 4 MG: 2 INJECTION, SOLUTION INTRAMUSCULAR; INTRAVENOUS at 12:10

## 2019-01-01 RX ADMIN — LABETALOL 20 MG/4 ML (5 MG/ML) INTRAVENOUS SYRINGE 5 MG: at 05:55

## 2019-01-01 RX ADMIN — CARVEDILOL 25 MG: 25 TABLET, FILM COATED ORAL at 17:55

## 2019-01-01 RX ADMIN — INSULIN GLARGINE 9 UNITS: 100 INJECTION, SOLUTION SUBCUTANEOUS at 11:40

## 2019-01-01 RX ADMIN — MOXIFLOXACIN HYDROCHLORIDE 1 DROP: 5 SOLUTION/ DROPS OPHTHALMIC at 16:59

## 2019-01-01 RX ADMIN — ISOSORBIDE MONONITRATE 120 MG: 30 TABLET, EXTENDED RELEASE ORAL at 19:30

## 2019-01-01 RX ADMIN — NITROGLYCERIN 0.4 MG: 0.4 TABLET SUBLINGUAL at 02:13

## 2019-01-01 RX ADMIN — SODIUM CHLORIDE 300 ML: 9 INJECTION, SOLUTION INTRAVENOUS at 08:34

## 2019-01-01 RX ADMIN — ISOSORBIDE MONONITRATE 120 MG: 60 TABLET, EXTENDED RELEASE ORAL at 21:46

## 2019-01-01 RX ADMIN — DOLUTEGRAVIR SODIUM 50 MG: 50 TABLET, FILM COATED ORAL at 21:52

## 2019-01-01 RX ADMIN — INSULIN ASPART 1 UNITS: 100 INJECTION, SOLUTION INTRAVENOUS; SUBCUTANEOUS at 13:09

## 2019-01-01 RX ADMIN — SODIUM CHLORIDE 250 ML: 9 INJECTION, SOLUTION INTRAVENOUS at 15:59

## 2019-01-01 RX ADMIN — OMEGA-3-ACID ETHYL ESTERS 2 G: 1 CAPSULE, LIQUID FILLED ORAL at 10:02

## 2019-01-01 RX ADMIN — INSULIN GLARGINE 18 UNITS: 100 INJECTION, SOLUTION SUBCUTANEOUS at 20:44

## 2019-01-01 RX ADMIN — UMECLIDINIUM 1 PUFF: 62.5 AEROSOL, POWDER ORAL at 07:34

## 2019-01-01 RX ADMIN — HEPARIN SODIUM 5000 UNITS: 5000 INJECTION, SOLUTION INTRAVENOUS; SUBCUTANEOUS at 12:52

## 2019-01-01 RX ADMIN — ATORVASTATIN CALCIUM 40 MG: 40 TABLET, FILM COATED ORAL at 21:56

## 2019-01-01 RX ADMIN — SODIUM CHLORIDE: 9 INJECTION, SOLUTION INTRAVENOUS at 12:05

## 2019-01-01 RX ADMIN — HEPARIN SODIUM 5000 UNITS: 5000 INJECTION, SOLUTION INTRAVENOUS; SUBCUTANEOUS at 13:20

## 2019-01-01 RX ADMIN — ATORVASTATIN CALCIUM 40 MG: 40 TABLET, FILM COATED ORAL at 22:09

## 2019-01-01 RX ADMIN — INSULIN ASPART 2 UNITS: 100 INJECTION, SOLUTION INTRAVENOUS; SUBCUTANEOUS at 09:08

## 2019-01-01 RX ADMIN — ATORVASTATIN CALCIUM 40 MG: 40 TABLET, FILM COATED ORAL at 22:33

## 2019-01-01 RX ADMIN — DAPSONE 100 MG: 100 TABLET ORAL at 21:40

## 2019-01-01 RX ADMIN — DAPSONE 100 MG: 100 TABLET ORAL at 21:52

## 2019-01-01 RX ADMIN — HYDRALAZINE HYDROCHLORIDE 25 MG: 25 TABLET ORAL at 16:43

## 2019-01-01 RX ADMIN — DOLUTEGRAVIR SODIUM 50 MG: 50 TABLET, FILM COATED ORAL at 21:35

## 2019-01-01 RX ADMIN — INSULIN GLARGINE 9 UNITS: 100 INJECTION, SOLUTION SUBCUTANEOUS at 22:14

## 2019-01-01 RX ADMIN — OMEGA-3-ACID ETHYL ESTERS 2 G: 1 CAPSULE, LIQUID FILLED ORAL at 12:31

## 2019-01-01 RX ADMIN — INSULIN GLARGINE 6 UNITS: 100 INJECTION, SOLUTION SUBCUTANEOUS at 21:47

## 2019-01-01 RX ADMIN — SODIUM CHLORIDE 250 ML: 9 INJECTION, SOLUTION INTRAVENOUS at 08:58

## 2019-01-01 RX ADMIN — DAPSONE 100 MG: 100 TABLET ORAL at 21:37

## 2019-01-01 RX ADMIN — CLOPIDOGREL BISULFATE 75 MG: 75 TABLET ORAL at 21:26

## 2019-01-01 RX ADMIN — SUCCINYLCHOLINE CHLORIDE 100 MG: 20 INJECTION, SOLUTION INTRAMUSCULAR; INTRAVENOUS; PARENTERAL at 08:58

## 2019-01-01 RX ADMIN — PANTOPRAZOLE SODIUM 40 MG: 40 TABLET, DELAYED RELEASE ORAL at 08:42

## 2019-01-01 RX ADMIN — SODIUM CHLORIDE 300 ML: 9 INJECTION, SOLUTION INTRAVENOUS at 15:59

## 2019-01-01 RX ADMIN — AMLODIPINE BESYLATE 5 MG: 5 TABLET ORAL at 09:18

## 2019-01-01 RX ADMIN — ASPIRIN 81 MG: 81 TABLET ORAL at 20:09

## 2019-01-01 RX ADMIN — INSULIN GLARGINE 6 UNITS: 100 INJECTION, SOLUTION SUBCUTANEOUS at 22:02

## 2019-01-01 RX ADMIN — MOXIFLOXACIN HYDROCHLORIDE 1 DROP: 5 SOLUTION/ DROPS OPHTHALMIC at 09:37

## 2019-01-01 RX ADMIN — CARVEDILOL 12.5 MG: 12.5 TABLET, FILM COATED ORAL at 10:02

## 2019-01-01 RX ADMIN — CINACALCET HYDROCHLORIDE 30 MG: 30 TABLET, FILM COATED ORAL at 11:41

## 2019-01-01 RX ADMIN — Medication 4900 UNITS: at 08:40

## 2019-01-01 RX ADMIN — PROPOFOL: 10 INJECTION, EMULSION INTRAVENOUS at 09:13

## 2019-01-01 RX ADMIN — NITROGLYCERIN 0.1 MCG/KG/MIN: 20 INJECTION INTRAVENOUS at 06:27

## 2019-01-01 RX ADMIN — AMLODIPINE BESYLATE 5 MG: 5 TABLET ORAL at 22:01

## 2019-01-01 RX ADMIN — KETOROLAC TROMETHAMINE 1 DROP: 5 SOLUTION OPHTHALMIC at 16:59

## 2019-01-01 RX ADMIN — CARVEDILOL 12.5 MG: 12.5 TABLET, FILM COATED ORAL at 11:41

## 2019-01-01 RX ADMIN — MAGNESIUM OXIDE TAB 400 MG (241.3 MG ELEMENTAL MG) 400 MG: 400 (241.3 MG) TAB at 21:56

## 2019-01-01 RX ADMIN — CINACALCET HYDROCHLORIDE 30 MG: 30 TABLET, FILM COATED ORAL at 08:31

## 2019-01-01 RX ADMIN — Medication 1 CAPSULE: at 21:36

## 2019-01-01 RX ADMIN — PREDNISOLONE ACETATE 1 DROP: 10 SUSPENSION/ DROPS OPHTHALMIC at 09:39

## 2019-01-01 RX ADMIN — PANTOPRAZOLE SODIUM 40 MG: 40 TABLET, DELAYED RELEASE ORAL at 10:56

## 2019-01-01 RX ADMIN — DOLUTEGRAVIR SODIUM 50 MG: 50 TABLET, FILM COATED ORAL at 21:43

## 2019-01-01 RX ADMIN — IRBESARTAN 300 MG: 300 TABLET ORAL at 20:23

## 2019-01-01 RX ADMIN — AMLODIPINE BESYLATE 5 MG: 5 TABLET ORAL at 20:43

## 2019-01-01 RX ADMIN — SODIUM CHLORIDE 250 ML: 9 INJECTION, SOLUTION INTRAVENOUS at 08:34

## 2019-01-01 RX ADMIN — OMEGA-3-ACID ETHYL ESTERS 2 G: 1 CAPSULE, LIQUID FILLED ORAL at 20:23

## 2019-01-01 RX ADMIN — CLONAZEPAM 0.5 MG: 0.5 TABLET ORAL at 22:45

## 2019-01-01 RX ADMIN — DAPSONE 100 MG: 100 TABLET ORAL at 21:25

## 2019-01-01 RX ADMIN — ATORVASTATIN CALCIUM 40 MG: 40 TABLET, FILM COATED ORAL at 21:46

## 2019-01-01 RX ADMIN — INSULIN ASPART 2 UNITS: 100 INJECTION, SOLUTION INTRAVENOUS; SUBCUTANEOUS at 09:48

## 2019-01-01 RX ADMIN — INSULIN GLARGINE 15 UNITS: 100 INJECTION, SOLUTION SUBCUTANEOUS at 09:11

## 2019-01-01 RX ADMIN — PANTOPRAZOLE SODIUM 40 MG: 40 TABLET, DELAYED RELEASE ORAL at 10:11

## 2019-01-01 RX ADMIN — INSULIN GLARGINE 15 UNITS: 100 INJECTION, SOLUTION SUBCUTANEOUS at 21:44

## 2019-01-01 RX ADMIN — CARVEDILOL 25 MG: 25 TABLET, FILM COATED ORAL at 17:57

## 2019-01-01 RX ADMIN — IRBESARTAN 300 MG: 300 TABLET ORAL at 22:09

## 2019-01-01 RX ADMIN — ERYTHROPOIETIN 15000 UNITS: 10000 INJECTION, SOLUTION INTRAVENOUS; SUBCUTANEOUS at 10:48

## 2019-01-01 RX ADMIN — HEPARIN SODIUM 5000 UNITS: 5000 INJECTION, SOLUTION INTRAVENOUS; SUBCUTANEOUS at 22:10

## 2019-01-01 RX ADMIN — ABACAVIR 600 MG: 300 TABLET, FILM COATED ORAL at 22:06

## 2019-01-01 RX ADMIN — ASPIRIN 81 MG: 81 TABLET ORAL at 20:22

## 2019-01-01 RX ADMIN — ASPIRIN 81 MG: 81 TABLET ORAL at 09:43

## 2019-01-01 RX ADMIN — NITROGLYCERIN 1 PATCH: 0.4 PATCH TRANSDERMAL at 02:49

## 2019-01-01 RX ADMIN — SODIUM CHLORIDE 250 ML: 9 INJECTION, SOLUTION INTRAVENOUS at 11:41

## 2019-01-01 RX ADMIN — Medication 1 CAPSULE: at 21:20

## 2019-01-01 RX ADMIN — INSULIN GLARGINE 9 UNITS: 100 INJECTION, SOLUTION SUBCUTANEOUS at 10:03

## 2019-01-01 RX ADMIN — Medication 1 CAPSULE: at 22:09

## 2019-01-01 RX ADMIN — IRBESARTAN 300 MG: 300 TABLET ORAL at 21:19

## 2019-01-01 RX ADMIN — ASPIRIN 81 MG: 81 TABLET, COATED ORAL at 09:06

## 2019-01-01 RX ADMIN — PREDNISOLONE ACETATE 1 DROP: 10 SUSPENSION/ DROPS OPHTHALMIC at 21:31

## 2019-01-01 RX ADMIN — SODIUM CHLORIDE 250 ML: 9 INJECTION, SOLUTION INTRAVENOUS at 10:50

## 2019-01-01 RX ADMIN — CLONIDINE HYDROCHLORIDE 0.1 MG: 0.1 TABLET ORAL at 22:09

## 2019-01-01 RX ADMIN — CARVEDILOL 25 MG: 25 TABLET, FILM COATED ORAL at 08:39

## 2019-01-01 RX ADMIN — AMLODIPINE BESYLATE 5 MG: 5 TABLET ORAL at 05:54

## 2019-01-01 RX ADMIN — AMLODIPINE BESYLATE 5 MG: 5 TABLET ORAL at 21:08

## 2019-01-01 RX ADMIN — SODIUM CHLORIDE 200 ML: 9 INJECTION, SOLUTION INTRAVENOUS at 15:21

## 2019-01-01 RX ADMIN — IRBESARTAN 300 MG: 300 TABLET ORAL at 21:36

## 2019-01-01 RX ADMIN — INSULIN GLARGINE 15 UNITS: 100 INJECTION, SOLUTION SUBCUTANEOUS at 22:33

## 2019-01-01 RX ADMIN — ASPIRIN 81 MG: 81 TABLET, COATED ORAL at 10:11

## 2019-01-01 RX ADMIN — OXYCODONE HYDROCHLORIDE AND ACETAMINOPHEN 1 TABLET: 5; 325 TABLET ORAL at 03:39

## 2019-01-01 RX ADMIN — INSULIN GLARGINE 6 UNITS: 100 INJECTION, SOLUTION SUBCUTANEOUS at 21:46

## 2019-01-01 RX ADMIN — Medication 5 MG: at 22:01

## 2019-01-01 RX ADMIN — PANTOPRAZOLE SODIUM 40 MG: 40 TABLET, DELAYED RELEASE ORAL at 10:33

## 2019-01-01 RX ADMIN — UMECLIDINIUM 1 PUFF: 62.5 AEROSOL, POWDER ORAL at 07:58

## 2019-01-01 RX ADMIN — HEPARIN SODIUM 5000 UNITS: 5000 INJECTION, SOLUTION INTRAVENOUS; SUBCUTANEOUS at 20:24

## 2019-01-01 RX ADMIN — ABACAVIR 600 MG: 300 TABLET, FILM COATED ORAL at 21:22

## 2019-01-01 RX ADMIN — IRBESARTAN 300 MG: 300 TABLET ORAL at 21:46

## 2019-01-01 RX ADMIN — NITROGLYCERIN 2 MCG/KG/MIN: 20 INJECTION INTRAVENOUS at 00:44

## 2019-01-01 RX ADMIN — INSULIN ASPART 3 UNITS: 100 INJECTION, SOLUTION INTRAVENOUS; SUBCUTANEOUS at 18:47

## 2019-01-01 RX ADMIN — GABAPENTIN 300 MG: 300 CAPSULE ORAL at 08:43

## 2019-01-01 RX ADMIN — DOLUTEGRAVIR SODIUM 50 MG: 50 TABLET, FILM COATED ORAL at 22:10

## 2019-01-01 RX ADMIN — HYDRALAZINE HYDROCHLORIDE 25 MG: 25 TABLET ORAL at 21:30

## 2019-01-01 RX ADMIN — NITROGLYCERIN 0.17 MCG/KG/MIN: 20 INJECTION INTRAVENOUS at 04:55

## 2019-01-01 RX ADMIN — MELATONIN 5 MG TABLET 5 MG: at 22:33

## 2019-01-01 RX ADMIN — ACETAMINOPHEN 650 MG: 325 TABLET, FILM COATED ORAL at 00:53

## 2019-01-01 RX ADMIN — IRBESARTAN 300 MG: 300 TABLET ORAL at 21:57

## 2019-01-01 RX ADMIN — MAGNESIUM OXIDE TAB 400 MG (241.3 MG ELEMENTAL MG) 400 MG: 400 (241.3 MG) TAB at 22:01

## 2019-01-01 RX ADMIN — CLONIDINE HYDROCHLORIDE 0.1 MG: 0.1 TABLET ORAL at 08:23

## 2019-01-01 RX ADMIN — DEXTROSE MONOHYDRATE 25 ML: 500 INJECTION PARENTERAL at 18:15

## 2019-01-01 RX ADMIN — DOLUTEGRAVIR SODIUM 50 MG: 50 TABLET, FILM COATED ORAL at 21:56

## 2019-01-01 RX ADMIN — SODIUM CHLORIDE 200 ML: 9 INJECTION, SOLUTION INTRAVENOUS at 09:19

## 2019-01-01 RX ADMIN — IRBESARTAN 300 MG: 300 TABLET ORAL at 22:31

## 2019-01-01 RX ADMIN — ABACAVIR SULFATE 600 MG: 300 TABLET, FILM COATED ORAL at 19:30

## 2019-01-01 RX ADMIN — INSULIN ASPART 4 UNITS: 100 INJECTION, SOLUTION INTRAVENOUS; SUBCUTANEOUS at 16:08

## 2019-01-01 RX ADMIN — CARVEDILOL 25 MG: 25 TABLET, FILM COATED ORAL at 09:44

## 2019-01-01 RX ADMIN — HYDRALAZINE HYDROCHLORIDE 25 MG: 25 TABLET ORAL at 22:34

## 2019-01-01 RX ADMIN — OMEGA-3-ACID ETHYL ESTERS 2 G: 1 CAPSULE, LIQUID FILLED ORAL at 08:08

## 2019-01-01 RX ADMIN — ISOSORBIDE MONONITRATE 120 MG: 60 TABLET, EXTENDED RELEASE ORAL at 20:51

## 2019-01-01 RX ADMIN — HEPARIN SODIUM 500 UNITS/HR: 1000 INJECTION INTRAVENOUS; SUBCUTANEOUS at 07:00

## 2019-01-01 RX ADMIN — ACETAMINOPHEN 325 MG: 325 TABLET, FILM COATED ORAL at 16:11

## 2019-01-01 RX ADMIN — AMLODIPINE BESYLATE 5 MG: 5 TABLET ORAL at 20:06

## 2019-01-01 RX ADMIN — HEPARIN SODIUM 5000 UNITS: 5000 INJECTION, SOLUTION INTRAVENOUS; SUBCUTANEOUS at 23:40

## 2019-01-01 RX ADMIN — Medication: at 10:49

## 2019-01-01 RX ADMIN — CARVEDILOL 12.5 MG: 12.5 TABLET, FILM COATED ORAL at 13:00

## 2019-01-01 RX ADMIN — INSULIN ASPART 1 UNITS: 100 INJECTION, SOLUTION INTRAVENOUS; SUBCUTANEOUS at 18:02

## 2019-01-01 RX ADMIN — HEPARIN SODIUM 5000 UNITS: 5000 INJECTION, SOLUTION INTRAVENOUS; SUBCUTANEOUS at 08:15

## 2019-01-01 RX ADMIN — UMECLIDINIUM 1 PUFF: 62.5 AEROSOL, POWDER ORAL at 10:37

## 2019-01-01 RX ADMIN — NITROGLYCERIN 0.07 MCG/KG/MIN: 20 INJECTION INTRAVENOUS at 09:36

## 2019-01-01 RX ADMIN — CARVEDILOL 25 MG: 25 TABLET, FILM COATED ORAL at 17:09

## 2019-01-01 RX ADMIN — ATORVASTATIN CALCIUM 40 MG: 40 TABLET, FILM COATED ORAL at 21:37

## 2019-01-01 RX ADMIN — OMEGA-3-ACID ETHYL ESTERS 2 G: 1 CAPSULE, LIQUID FILLED ORAL at 22:09

## 2019-01-01 RX ADMIN — Medication 2 G: at 08:30

## 2019-01-01 RX ADMIN — HEPARIN SODIUM 5000 UNITS: 5000 INJECTION, SOLUTION INTRAVENOUS; SUBCUTANEOUS at 00:00

## 2019-01-01 RX ADMIN — INSULIN GLARGINE 6 UNITS: 100 INJECTION, SOLUTION SUBCUTANEOUS at 21:37

## 2019-01-01 RX ADMIN — INSULIN GLARGINE 15 UNITS: 100 INJECTION, SOLUTION SUBCUTANEOUS at 08:24

## 2019-01-01 RX ADMIN — CARVEDILOL 12.5 MG: 12.5 TABLET, FILM COATED ORAL at 13:29

## 2019-01-01 RX ADMIN — DOXERCALCIFEROL 1 MCG: 4 INJECTION, SOLUTION INTRAVENOUS at 15:14

## 2019-01-01 RX ADMIN — HYDRALAZINE HYDROCHLORIDE 25 MG: 25 TABLET ORAL at 16:39

## 2019-01-01 RX ADMIN — RITONAVIR 100 MG: 100 TABLET, FILM COATED ORAL at 21:25

## 2019-01-01 RX ADMIN — Medication: at 11:55

## 2019-01-01 RX ADMIN — ASPIRIN 81 MG: 81 TABLET ORAL at 20:51

## 2019-01-01 RX ADMIN — DARUNAVIR 800 MG: 800 TABLET, FILM COATED ORAL at 21:52

## 2019-01-01 RX ADMIN — ABACAVIR 600 MG: 300 TABLET, FILM COATED ORAL at 19:28

## 2019-01-01 RX ADMIN — MOXIFLOXACIN HYDROCHLORIDE 1 DROP: 5 SOLUTION/ DROPS OPHTHALMIC at 21:31

## 2019-01-01 RX ADMIN — CARVEDILOL 25 MG: 25 TABLET, FILM COATED ORAL at 12:32

## 2019-01-01 RX ADMIN — ASPIRIN 81 MG: 81 TABLET ORAL at 21:08

## 2019-01-01 RX ADMIN — SODIUM CHLORIDE 300 ML: 9 INJECTION, SOLUTION INTRAVENOUS at 11:55

## 2019-01-01 RX ADMIN — Medication 4850 UNITS: at 01:00

## 2019-01-01 RX ADMIN — INSULIN GLARGINE 9 UNITS: 100 INJECTION, SOLUTION SUBCUTANEOUS at 09:17

## 2019-01-01 RX ADMIN — Medication 0.03 MCG/KG/MIN: at 11:59

## 2019-01-01 RX ADMIN — PANTOPRAZOLE SODIUM 40 MG: 40 TABLET, DELAYED RELEASE ORAL at 13:29

## 2019-01-01 RX ADMIN — GABAPENTIN 300 MG: 300 CAPSULE ORAL at 20:40

## 2019-01-01 RX ADMIN — CINACALCET HYDROCHLORIDE 30 MG: 30 TABLET, FILM COATED ORAL at 10:36

## 2019-01-01 RX ADMIN — DOLUTEGRAVIR SODIUM 50 MG: 50 TABLET, FILM COATED ORAL at 20:24

## 2019-01-01 RX ADMIN — MIRTAZAPINE 15 MG: 15 TABLET, FILM COATED ORAL at 22:32

## 2019-01-01 RX ADMIN — ERYTHROPOIETIN 15000 UNITS: 10000 INJECTION, SOLUTION INTRAVENOUS; SUBCUTANEOUS at 10:29

## 2019-01-01 ASSESSMENT — ACTIVITIES OF DAILY LIVING (ADL)
ADLS_ACUITY_SCORE: 16
ADLS_ACUITY_SCORE: 17
ADLS_ACUITY_SCORE: 17
PREVIOUS_RESPONSIBILITIES: SHOPPING;MEDICATION MANAGEMENT;FINANCES
ADLS_ACUITY_SCORE: 17
ADLS_ACUITY_SCORE: 17
ADLS_ACUITY_SCORE: 20
ADLS_ACUITY_SCORE: 14
ADLS_ACUITY_SCORE: 15
ADLS_ACUITY_SCORE: 17
ADLS_ACUITY_SCORE: 17
ADLS_ACUITY_SCORE: 19
ADLS_ACUITY_SCORE: 17
ADLS_ACUITY_SCORE: 15
ADLS_ACUITY_SCORE: 19
ADLS_ACUITY_SCORE: 19
ADLS_ACUITY_SCORE: 18
ADLS_ACUITY_SCORE: 15
ADLS_ACUITY_SCORE: 17
ADLS_ACUITY_SCORE: 19
ADLS_ACUITY_SCORE: 16
ADLS_ACUITY_SCORE: 16
ADLS_ACUITY_SCORE: 20
ADLS_ACUITY_SCORE: 17
ADLS_ACUITY_SCORE: 21
ADLS_ACUITY_SCORE: 14
ADLS_ACUITY_SCORE: 16
ADLS_ACUITY_SCORE: 19
ADLS_ACUITY_SCORE: 19
ADLS_ACUITY_SCORE: 17
ADLS_ACUITY_SCORE: 17
ADLS_ACUITY_SCORE: 13
ADLS_ACUITY_SCORE: 20
ADLS_ACUITY_SCORE: 20
ADLS_ACUITY_SCORE: 15
ADLS_ACUITY_SCORE: 18
ADLS_ACUITY_SCORE: 21
ADLS_ACUITY_SCORE: 18
ADLS_ACUITY_SCORE: 14
ADLS_ACUITY_SCORE: 15
ADLS_ACUITY_SCORE: 15
ADLS_ACUITY_SCORE: 17
ADLS_ACUITY_SCORE: 18
ADLS_ACUITY_SCORE: 17
ADLS_ACUITY_SCORE: 19
ADLS_ACUITY_SCORE: 19
ADLS_ACUITY_SCORE: 16
ADLS_ACUITY_SCORE: 15
ADLS_ACUITY_SCORE: 17
ADLS_ACUITY_SCORE: 21
ADLS_ACUITY_SCORE: 14
ADLS_ACUITY_SCORE: 20
ADLS_ACUITY_SCORE: 19
ADLS_ACUITY_SCORE: 14
ADLS_ACUITY_SCORE: 17
ADLS_ACUITY_SCORE: 14
ADLS_ACUITY_SCORE: 21
ADLS_ACUITY_SCORE: 17
ADLS_ACUITY_SCORE: 15
ADLS_ACUITY_SCORE: 14
ADLS_ACUITY_SCORE: 18
ADLS_ACUITY_SCORE: 21
ADLS_ACUITY_SCORE: 17
ADLS_ACUITY_SCORE: 21
ADLS_ACUITY_SCORE: 16
ADLS_ACUITY_SCORE: 17
ADLS_ACUITY_SCORE: 16
ADLS_ACUITY_SCORE: 14
ADLS_ACUITY_SCORE: 17
ADLS_ACUITY_SCORE: 17
ADLS_ACUITY_SCORE: 14
ADLS_ACUITY_SCORE: 20
ADLS_ACUITY_SCORE: 17
ADLS_ACUITY_SCORE: 18
ADLS_ACUITY_SCORE: 19
ADLS_ACUITY_SCORE: 17
ADLS_ACUITY_SCORE: 16
ADLS_ACUITY_SCORE: 16
ADLS_ACUITY_SCORE: 20
ADLS_ACUITY_SCORE: 15
ADLS_ACUITY_SCORE: 17
ADLS_ACUITY_SCORE: 20
ADLS_ACUITY_SCORE: 21
ADLS_ACUITY_SCORE: 18
ADLS_ACUITY_SCORE: 16
ADLS_ACUITY_SCORE: 17
ADLS_ACUITY_SCORE: 16
ADLS_ACUITY_SCORE: 17
ADLS_ACUITY_SCORE: 14
ADLS_ACUITY_SCORE: 16
ADLS_ACUITY_SCORE: 18
ADLS_ACUITY_SCORE: 17
ADLS_ACUITY_SCORE: 19
ADLS_ACUITY_SCORE: 17
ADLS_ACUITY_SCORE: 20
ADLS_ACUITY_SCORE: 21
ADLS_ACUITY_SCORE: 21
ADLS_ACUITY_SCORE: 18
ADLS_ACUITY_SCORE: 17
ADLS_ACUITY_SCORE: 21
ADLS_ACUITY_SCORE: 17
ADLS_ACUITY_SCORE: 17
ADLS_ACUITY_SCORE: 18
ADLS_ACUITY_SCORE: 17
ADLS_ACUITY_SCORE: 17
ADLS_ACUITY_SCORE: 18
ADLS_ACUITY_SCORE: 21
ADLS_ACUITY_SCORE: 16
ADLS_ACUITY_SCORE: 14
ADLS_ACUITY_SCORE: 17
ADLS_ACUITY_SCORE: 15

## 2019-01-01 ASSESSMENT — MIFFLIN-ST. JEOR
SCORE: 1678.88
SCORE: 1590.56
SCORE: 1697.93
SCORE: 1636.9
SCORE: 1670.26
SCORE: 1684.13
SCORE: 1601.06
SCORE: 1587
SCORE: 1588.56
SCORE: 1615.56
SCORE: 1678.13
SCORE: 1580.65
SCORE: 1670.72
SCORE: 1672.13
SCORE: 1690.22
SCORE: 1679.13
SCORE: 1672.13

## 2019-01-01 ASSESSMENT — ENCOUNTER SYMPTOMS
CONSTIPATION: 1
DIARRHEA: 0
SHORTNESS OF BREATH: 1
FATIGUE: 1
COUGH: 0
VOMITING: 1
FEVER: 0
UNEXPECTED WEIGHT CHANGE: 0
CHEST TIGHTNESS: 1
NAUSEA: 1
UNEXPECTED WEIGHT CHANGE: 0
DIARRHEA: 1
FEVER: 0
SHORTNESS OF BREATH: 0
NAUSEA: 0
COUGH: 0
VOMITING: 1
ARTHRALGIAS: 0
WEAKNESS: 1
SHORTNESS OF BREATH: 1
VOMITING: 1
SHORTNESS OF BREATH: 1
COUGH: 0
SHORTNESS OF BREATH: 1
NAUSEA: 0
CONSTIPATION: 1
ABDOMINAL PAIN: 0
HEADACHES: 1
FEVER: 0
CHILLS: 1
DIAPHORESIS: 0
VOMITING: 1
NECK PAIN: 1
SHORTNESS OF BREATH: 1
SHORTNESS OF BREATH: 0
BACK PAIN: 0
CHILLS: 1
VOMITING: 0
FEVER: 1
MYALGIAS: 1
SLEEP DISTURBANCE: 1
DIZZINESS: 0
BLOOD IN STOOL: 0
NAUSEA: 1
COUGH: 0
FEVER: 0
VOMITING: 0
ABDOMINAL PAIN: 0

## 2019-01-03 ENCOUNTER — APPOINTMENT (OUTPATIENT)
Dept: GENERAL RADIOLOGY | Facility: CLINIC | Age: 69
DRG: 190 | End: 2019-01-03
Payer: MEDICARE

## 2019-01-03 ENCOUNTER — HOSPITAL ENCOUNTER (INPATIENT)
Facility: CLINIC | Age: 69
LOS: 3 days | Discharge: HOME HEALTH | DRG: 190 | End: 2019-01-06
Attending: EMERGENCY MEDICINE | Admitting: INTERNAL MEDICINE
Payer: MEDICARE

## 2019-01-03 DIAGNOSIS — R79.89 ELEVATED TROPONIN: ICD-10-CM

## 2019-01-03 DIAGNOSIS — E11.00 TYPE 2 DIABETES MELLITUS WITH HYPEROSMOLARITY WITHOUT COMA, WITH LONG-TERM CURRENT USE OF INSULIN (H): Chronic | ICD-10-CM

## 2019-01-03 DIAGNOSIS — Z79.4 TYPE 2 DIABETES MELLITUS WITH HYPEROSMOLARITY WITHOUT COMA, WITH LONG-TERM CURRENT USE OF INSULIN (H): Chronic | ICD-10-CM

## 2019-01-03 DIAGNOSIS — J42 CHRONIC BRONCHITIS, UNSPECIFIED CHRONIC BRONCHITIS TYPE (H): ICD-10-CM

## 2019-01-03 DIAGNOSIS — J45.901 EXACERBATION OF ASTHMA, UNSPECIFIED ASTHMA SEVERITY, UNSPECIFIED WHETHER PERSISTENT: ICD-10-CM

## 2019-01-03 DIAGNOSIS — D64.9 ANEMIA, UNSPECIFIED TYPE: ICD-10-CM

## 2019-01-03 DIAGNOSIS — R53.81 PHYSICAL DECONDITIONING: Primary | ICD-10-CM

## 2019-01-03 DIAGNOSIS — Z99.2 ESRD (END STAGE RENAL DISEASE) ON DIALYSIS (H): ICD-10-CM

## 2019-01-03 DIAGNOSIS — N28.9 RENAL INSUFFICIENCY: ICD-10-CM

## 2019-01-03 DIAGNOSIS — N18.6 ESRD (END STAGE RENAL DISEASE) ON DIALYSIS (H): ICD-10-CM

## 2019-01-03 PROBLEM — J44.9 COPD (CHRONIC OBSTRUCTIVE PULMONARY DISEASE) (H): Status: ACTIVE | Noted: 2019-01-03

## 2019-01-03 LAB
ABO + RH BLD: NORMAL
ABO + RH BLD: NORMAL
ALBUMIN SERPL-MCNC: 3.6 G/DL (ref 3.4–5)
ALP SERPL-CCNC: 107 U/L (ref 40–150)
ALT SERPL W P-5'-P-CCNC: 16 U/L (ref 0–70)
ANION GAP SERPL CALCULATED.3IONS-SCNC: 9 MMOL/L (ref 3–14)
AST SERPL W P-5'-P-CCNC: 31 U/L (ref 0–45)
BASOPHILS # BLD AUTO: 0 10E9/L (ref 0–0.2)
BASOPHILS NFR BLD AUTO: 0.3 %
BILIRUB SERPL-MCNC: 0.7 MG/DL (ref 0.2–1.3)
BLD GP AB SCN SERPL QL: NORMAL
BLD PROD TYP BPU: NORMAL
BLD PROD TYP BPU: NORMAL
BLD UNIT ID BPU: 0
BLOOD BANK CMNT PATIENT-IMP: NORMAL
BLOOD PRODUCT CODE: NORMAL
BPU ID: NORMAL
BUN SERPL-MCNC: 39 MG/DL (ref 7–30)
CALCIUM SERPL-MCNC: 9.5 MG/DL (ref 8.5–10.1)
CHLORIDE SERPL-SCNC: 96 MMOL/L (ref 94–109)
CO2 SERPL-SCNC: 29 MMOL/L (ref 20–32)
CREAT SERPL-MCNC: 5.52 MG/DL (ref 0.66–1.25)
DIFFERENTIAL METHOD BLD: ABNORMAL
EOSINOPHIL # BLD AUTO: 0 10E9/L (ref 0–0.7)
EOSINOPHIL NFR BLD AUTO: 0.9 %
ERYTHROCYTE [DISTWIDTH] IN BLOOD BY AUTOMATED COUNT: 18.6 % (ref 10–15)
FLUAV+FLUBV AG SPEC QL: NEGATIVE
FLUAV+FLUBV AG SPEC QL: NEGATIVE
GFR SERPL CREATININE-BSD FRML MDRD: 10 ML/MIN/{1.73_M2}
GLUCOSE BLDC GLUCOMTR-MCNC: 168 MG/DL (ref 70–99)
GLUCOSE BLDC GLUCOMTR-MCNC: 179 MG/DL (ref 70–99)
GLUCOSE BLDC GLUCOMTR-MCNC: 185 MG/DL (ref 70–99)
GLUCOSE SERPL-MCNC: 138 MG/DL (ref 70–99)
HCT VFR BLD AUTO: 21.9 % (ref 40–53)
HGB BLD-MCNC: 6.7 G/DL (ref 13.3–17.7)
IMM GRANULOCYTES # BLD: 0 10E9/L (ref 0–0.4)
IMM GRANULOCYTES NFR BLD: 0.6 %
INTERPRETATION ECG - MUSE: NORMAL
INTERPRETATION ECG - MUSE: NORMAL
LACTATE BLD-SCNC: 1.2 MMOL/L (ref 0.7–2)
LACTATE BLD-SCNC: 1.4 MMOL/L (ref 0.7–2)
LYMPHOCYTES # BLD AUTO: 0.3 10E9/L (ref 0.8–5.3)
LYMPHOCYTES NFR BLD AUTO: 10.5 %
MCH RBC QN AUTO: 29.1 PG (ref 26.5–33)
MCHC RBC AUTO-ENTMCNC: 30.6 G/DL (ref 31.5–36.5)
MCV RBC AUTO: 95 FL (ref 78–100)
MONOCYTES # BLD AUTO: 0.4 10E9/L (ref 0–1.3)
MONOCYTES NFR BLD AUTO: 11.1 %
NEUTROPHILS # BLD AUTO: 2.5 10E9/L (ref 1.6–8.3)
NEUTROPHILS NFR BLD AUTO: 76.6 %
NRBC # BLD AUTO: 0 10*3/UL
NRBC BLD AUTO-RTO: 0 /100
NT-PROBNP SERPL-MCNC: ABNORMAL PG/ML (ref 0–900)
NUM BPU REQUESTED: 1
PLATELET # BLD AUTO: 103 10E9/L (ref 150–450)
POTASSIUM SERPL-SCNC: 3.8 MMOL/L (ref 3.4–5.3)
PROT SERPL-MCNC: 8 G/DL (ref 6.8–8.8)
RBC # BLD AUTO: 2.3 10E12/L (ref 4.4–5.9)
SODIUM SERPL-SCNC: 134 MMOL/L (ref 133–144)
SPECIMEN EXP DATE BLD: NORMAL
SPECIMEN SOURCE: NORMAL
TRANSFUSION STATUS PATIENT QL: NORMAL
TRANSFUSION STATUS PATIENT QL: NORMAL
TROPONIN I SERPL-MCNC: 0.09 UG/L (ref 0–0.04)
TROPONIN I SERPL-MCNC: 0.11 UG/L (ref 0–0.04)
WBC # BLD AUTO: 3.3 10E9/L (ref 4–11)

## 2019-01-03 PROCEDURE — 96374 THER/PROPH/DIAG INJ IV PUSH: CPT

## 2019-01-03 PROCEDURE — 83880 ASSAY OF NATRIURETIC PEPTIDE: CPT | Performed by: EMERGENCY MEDICINE

## 2019-01-03 PROCEDURE — 25000131 ZZH RX MED GY IP 250 OP 636 PS 637: Mod: GY | Performed by: PHYSICIAN ASSISTANT

## 2019-01-03 PROCEDURE — 99285 EMERGENCY DEPT VISIT HI MDM: CPT | Mod: 25

## 2019-01-03 PROCEDURE — A9270 NON-COVERED ITEM OR SERVICE: HCPCS | Mod: GY | Performed by: EMERGENCY MEDICINE

## 2019-01-03 PROCEDURE — P9016 RBC LEUKOCYTES REDUCED: HCPCS | Performed by: PHYSICIAN ASSISTANT

## 2019-01-03 PROCEDURE — 83605 ASSAY OF LACTIC ACID: CPT | Performed by: INTERNAL MEDICINE

## 2019-01-03 PROCEDURE — 25000132 ZZH RX MED GY IP 250 OP 250 PS 637: Mod: GY | Performed by: EMERGENCY MEDICINE

## 2019-01-03 PROCEDURE — 36415 COLL VENOUS BLD VENIPUNCTURE: CPT | Performed by: INTERNAL MEDICINE

## 2019-01-03 PROCEDURE — 71046 X-RAY EXAM CHEST 2 VIEWS: CPT

## 2019-01-03 PROCEDURE — 86900 BLOOD TYPING SEROLOGIC ABO: CPT | Performed by: PHYSICIAN ASSISTANT

## 2019-01-03 PROCEDURE — 94640 AIRWAY INHALATION TREATMENT: CPT

## 2019-01-03 PROCEDURE — 40000274 ZZH STATISTIC RCP CONSULT EA 30 MIN

## 2019-01-03 PROCEDURE — 00000146 ZZHCL STATISTIC GLUCOSE BY METER IP

## 2019-01-03 PROCEDURE — 12000000 ZZH R&B MED SURG/OB

## 2019-01-03 PROCEDURE — 40000275 ZZH STATISTIC RCP TIME EA 10 MIN

## 2019-01-03 PROCEDURE — 94640 AIRWAY INHALATION TREATMENT: CPT | Mod: 76

## 2019-01-03 PROCEDURE — 86850 RBC ANTIBODY SCREEN: CPT | Performed by: PHYSICIAN ASSISTANT

## 2019-01-03 PROCEDURE — 85025 COMPLETE CBC W/AUTO DIFF WBC: CPT | Performed by: EMERGENCY MEDICINE

## 2019-01-03 PROCEDURE — 25000128 H RX IP 250 OP 636: Performed by: PHYSICIAN ASSISTANT

## 2019-01-03 PROCEDURE — 25000125 ZZHC RX 250: Performed by: EMERGENCY MEDICINE

## 2019-01-03 PROCEDURE — 25000128 H RX IP 250 OP 636: Performed by: EMERGENCY MEDICINE

## 2019-01-03 PROCEDURE — 99223 1ST HOSP IP/OBS HIGH 75: CPT | Mod: AI | Performed by: PHYSICIAN ASSISTANT

## 2019-01-03 PROCEDURE — 87040 BLOOD CULTURE FOR BACTERIA: CPT | Performed by: EMERGENCY MEDICINE

## 2019-01-03 PROCEDURE — 83605 ASSAY OF LACTIC ACID: CPT | Performed by: EMERGENCY MEDICINE

## 2019-01-03 PROCEDURE — 80053 COMPREHEN METABOLIC PANEL: CPT | Performed by: EMERGENCY MEDICINE

## 2019-01-03 PROCEDURE — 86923 COMPATIBILITY TEST ELECTRIC: CPT | Performed by: PHYSICIAN ASSISTANT

## 2019-01-03 PROCEDURE — 87804 INFLUENZA ASSAY W/OPTIC: CPT | Performed by: EMERGENCY MEDICINE

## 2019-01-03 PROCEDURE — A9270 NON-COVERED ITEM OR SERVICE: HCPCS | Mod: GY | Performed by: PHYSICIAN ASSISTANT

## 2019-01-03 PROCEDURE — 25000125 ZZHC RX 250: Performed by: PHYSICIAN ASSISTANT

## 2019-01-03 PROCEDURE — 84484 ASSAY OF TROPONIN QUANT: CPT | Performed by: EMERGENCY MEDICINE

## 2019-01-03 PROCEDURE — 25000132 ZZH RX MED GY IP 250 OP 250 PS 637: Mod: GY | Performed by: PHYSICIAN ASSISTANT

## 2019-01-03 PROCEDURE — 84484 ASSAY OF TROPONIN QUANT: CPT | Performed by: PHYSICIAN ASSISTANT

## 2019-01-03 PROCEDURE — 93005 ELECTROCARDIOGRAM TRACING: CPT | Mod: 76

## 2019-01-03 PROCEDURE — 93005 ELECTROCARDIOGRAM TRACING: CPT

## 2019-01-03 PROCEDURE — 36415 COLL VENOUS BLD VENIPUNCTURE: CPT | Performed by: PHYSICIAN ASSISTANT

## 2019-01-03 PROCEDURE — 86901 BLOOD TYPING SEROLOGIC RH(D): CPT | Performed by: PHYSICIAN ASSISTANT

## 2019-01-03 RX ORDER — MIRTAZAPINE 15 MG/1
15 TABLET, FILM COATED ORAL AT BEDTIME
Status: DISCONTINUED | OUTPATIENT
Start: 2019-01-03 | End: 2019-01-06 | Stop reason: HOSPADM

## 2019-01-03 RX ORDER — NICOTINE POLACRILEX 4 MG
15-30 LOZENGE BUCCAL
Status: DISCONTINUED | OUTPATIENT
Start: 2019-01-03 | End: 2019-01-06 | Stop reason: HOSPADM

## 2019-01-03 RX ORDER — ATORVASTATIN CALCIUM 40 MG/1
40 TABLET, FILM COATED ORAL AT BEDTIME
Status: DISCONTINUED | OUTPATIENT
Start: 2019-01-03 | End: 2019-01-06 | Stop reason: HOSPADM

## 2019-01-03 RX ORDER — CARVEDILOL 12.5 MG/1
12.5 TABLET ORAL 2 TIMES DAILY WITH MEALS
Status: DISCONTINUED | OUTPATIENT
Start: 2019-01-03 | End: 2019-01-06 | Stop reason: HOSPADM

## 2019-01-03 RX ORDER — AMOXICILLIN 250 MG
1 CAPSULE ORAL 2 TIMES DAILY
Status: DISCONTINUED | OUTPATIENT
Start: 2019-01-03 | End: 2019-01-06 | Stop reason: HOSPADM

## 2019-01-03 RX ORDER — OMEGA-3-ACID ETHYL ESTERS 1 G/1
2 CAPSULE, LIQUID FILLED ORAL 2 TIMES DAILY
Status: DISCONTINUED | OUTPATIENT
Start: 2019-01-03 | End: 2019-01-06 | Stop reason: HOSPADM

## 2019-01-03 RX ORDER — METHYLPREDNISOLONE SODIUM SUCCINATE 40 MG/ML
40 INJECTION, POWDER, LYOPHILIZED, FOR SOLUTION INTRAMUSCULAR; INTRAVENOUS EVERY 12 HOURS
Status: DISCONTINUED | OUTPATIENT
Start: 2019-01-03 | End: 2019-01-05 | Stop reason: ALTCHOICE

## 2019-01-03 RX ORDER — PANTOPRAZOLE SODIUM 40 MG/1
40 TABLET, DELAYED RELEASE ORAL DAILY
Status: DISCONTINUED | OUTPATIENT
Start: 2019-01-03 | End: 2019-01-06 | Stop reason: HOSPADM

## 2019-01-03 RX ORDER — ASPIRIN 81 MG/1
324 TABLET, CHEWABLE ORAL ONCE
Status: COMPLETED | OUTPATIENT
Start: 2019-01-03 | End: 2019-01-03

## 2019-01-03 RX ORDER — DAPSONE 100 MG/1
100 TABLET ORAL AT BEDTIME
Status: DISCONTINUED | OUTPATIENT
Start: 2019-01-03 | End: 2019-01-06 | Stop reason: HOSPADM

## 2019-01-03 RX ORDER — IRBESARTAN 300 MG/1
300 TABLET ORAL AT BEDTIME
Status: DISCONTINUED | OUTPATIENT
Start: 2019-01-03 | End: 2019-01-06 | Stop reason: HOSPADM

## 2019-01-03 RX ORDER — IPRATROPIUM BROMIDE AND ALBUTEROL SULFATE 2.5; .5 MG/3ML; MG/3ML
3 SOLUTION RESPIRATORY (INHALATION)
Status: DISCONTINUED | OUTPATIENT
Start: 2019-01-04 | End: 2019-01-06 | Stop reason: HOSPADM

## 2019-01-03 RX ORDER — CLOPIDOGREL BISULFATE 75 MG/1
75 TABLET ORAL EVERY EVENING
Status: DISCONTINUED | OUTPATIENT
Start: 2019-01-03 | End: 2019-01-06 | Stop reason: HOSPADM

## 2019-01-03 RX ORDER — ALBUTEROL SULFATE 0.83 MG/ML
3 SOLUTION RESPIRATORY (INHALATION)
Status: DISCONTINUED | OUTPATIENT
Start: 2019-01-03 | End: 2019-01-06 | Stop reason: HOSPADM

## 2019-01-03 RX ORDER — NALOXONE HYDROCHLORIDE 0.4 MG/ML
.1-.4 INJECTION, SOLUTION INTRAMUSCULAR; INTRAVENOUS; SUBCUTANEOUS
Status: DISCONTINUED | OUTPATIENT
Start: 2019-01-03 | End: 2019-01-06 | Stop reason: HOSPADM

## 2019-01-03 RX ORDER — GABAPENTIN 300 MG/1
300 CAPSULE ORAL 2 TIMES DAILY
Status: DISCONTINUED | OUTPATIENT
Start: 2019-01-03 | End: 2019-01-06 | Stop reason: HOSPADM

## 2019-01-03 RX ORDER — IPRATROPIUM BROMIDE AND ALBUTEROL SULFATE 2.5; .5 MG/3ML; MG/3ML
3 SOLUTION RESPIRATORY (INHALATION)
Status: COMPLETED | OUTPATIENT
Start: 2019-01-03 | End: 2019-01-03

## 2019-01-03 RX ORDER — HYDRALAZINE HYDROCHLORIDE 20 MG/ML
10 INJECTION INTRAMUSCULAR; INTRAVENOUS EVERY 4 HOURS PRN
Status: DISCONTINUED | OUTPATIENT
Start: 2019-01-03 | End: 2019-01-06 | Stop reason: HOSPADM

## 2019-01-03 RX ORDER — RITONAVIR 100 MG/1
100 TABLET ORAL AT BEDTIME
Status: DISCONTINUED | OUTPATIENT
Start: 2019-01-03 | End: 2019-01-06 | Stop reason: HOSPADM

## 2019-01-03 RX ORDER — AMOXICILLIN 250 MG
2 CAPSULE ORAL 2 TIMES DAILY
Status: DISCONTINUED | OUTPATIENT
Start: 2019-01-03 | End: 2019-01-06 | Stop reason: HOSPADM

## 2019-01-03 RX ORDER — ISOSORBIDE MONONITRATE 60 MG/1
120 TABLET, EXTENDED RELEASE ORAL EVERY EVENING
Status: DISCONTINUED | OUTPATIENT
Start: 2019-01-03 | End: 2019-01-06 | Stop reason: HOSPADM

## 2019-01-03 RX ORDER — ASPIRIN 81 MG/1
81 TABLET ORAL DAILY
Status: DISCONTINUED | OUTPATIENT
Start: 2019-01-03 | End: 2019-01-06 | Stop reason: HOSPADM

## 2019-01-03 RX ORDER — MAGNESIUM OXIDE 400 MG/1
400 TABLET ORAL AT BEDTIME
Status: DISCONTINUED | OUTPATIENT
Start: 2019-01-03 | End: 2019-01-06 | Stop reason: HOSPADM

## 2019-01-03 RX ORDER — DEXTROSE MONOHYDRATE 25 G/50ML
25-50 INJECTION, SOLUTION INTRAVENOUS
Status: DISCONTINUED | OUTPATIENT
Start: 2019-01-03 | End: 2019-01-06 | Stop reason: HOSPADM

## 2019-01-03 RX ORDER — ONDANSETRON 2 MG/ML
4 INJECTION INTRAMUSCULAR; INTRAVENOUS EVERY 6 HOURS PRN
Status: DISCONTINUED | OUTPATIENT
Start: 2019-01-03 | End: 2019-01-06 | Stop reason: HOSPADM

## 2019-01-03 RX ORDER — CLONAZEPAM 0.5 MG/1
0.5 TABLET ORAL
Status: DISCONTINUED | OUTPATIENT
Start: 2019-01-03 | End: 2019-01-06 | Stop reason: HOSPADM

## 2019-01-03 RX ORDER — ABACAVIR 300 MG/1
600 TABLET ORAL EVERY EVENING
Status: DISCONTINUED | OUTPATIENT
Start: 2019-01-03 | End: 2019-01-06 | Stop reason: HOSPADM

## 2019-01-03 RX ORDER — IPRATROPIUM BROMIDE AND ALBUTEROL SULFATE 2.5; .5 MG/3ML; MG/3ML
3 SOLUTION RESPIRATORY (INHALATION)
Status: DISCONTINUED | OUTPATIENT
Start: 2019-01-03 | End: 2019-01-06 | Stop reason: HOSPADM

## 2019-01-03 RX ORDER — ACETAMINOPHEN 325 MG/1
650 TABLET ORAL EVERY 4 HOURS PRN
Status: DISCONTINUED | OUTPATIENT
Start: 2019-01-03 | End: 2019-01-06 | Stop reason: HOSPADM

## 2019-01-03 RX ORDER — ONDANSETRON 4 MG/1
4 TABLET, ORALLY DISINTEGRATING ORAL EVERY 6 HOURS PRN
Status: DISCONTINUED | OUTPATIENT
Start: 2019-01-03 | End: 2019-01-06 | Stop reason: HOSPADM

## 2019-01-03 RX ORDER — METHYLPREDNISOLONE SODIUM SUCCINATE 125 MG/2ML
125 INJECTION, POWDER, LYOPHILIZED, FOR SOLUTION INTRAMUSCULAR; INTRAVENOUS ONCE
Status: COMPLETED | OUTPATIENT
Start: 2019-01-03 | End: 2019-01-03

## 2019-01-03 RX ORDER — IPRATROPIUM BROMIDE AND ALBUTEROL SULFATE 2.5; .5 MG/3ML; MG/3ML
3 SOLUTION RESPIRATORY (INHALATION) EVERY 4 HOURS
Status: DISCONTINUED | OUTPATIENT
Start: 2019-01-03 | End: 2019-01-03

## 2019-01-03 RX ORDER — HYDRALAZINE HYDROCHLORIDE 25 MG/1
25 TABLET, FILM COATED ORAL 3 TIMES DAILY
Status: DISCONTINUED | OUTPATIENT
Start: 2019-01-03 | End: 2019-01-06 | Stop reason: HOSPADM

## 2019-01-03 RX ORDER — IPRATROPIUM BROMIDE AND ALBUTEROL SULFATE 2.5; .5 MG/3ML; MG/3ML
3 SOLUTION RESPIRATORY (INHALATION) ONCE
Status: COMPLETED | OUTPATIENT
Start: 2019-01-03 | End: 2019-01-03

## 2019-01-03 RX ADMIN — IPRATROPIUM BROMIDE AND ALBUTEROL SULFATE 3 ML: .5; 3 SOLUTION RESPIRATORY (INHALATION) at 23:02

## 2019-01-03 RX ADMIN — GABAPENTIN 300 MG: 300 CAPSULE ORAL at 20:47

## 2019-01-03 RX ADMIN — IRBESARTAN 300 MG: 300 TABLET ORAL at 20:45

## 2019-01-03 RX ADMIN — DAPSONE 100 MG: 100 TABLET ORAL at 22:02

## 2019-01-03 RX ADMIN — DARUNAVIR 800 MG: 800 TABLET, FILM COATED ORAL at 22:03

## 2019-01-03 RX ADMIN — INSULIN GLARGINE 25 UNITS: 100 INJECTION, SOLUTION SUBCUTANEOUS at 20:47

## 2019-01-03 RX ADMIN — METHYLPREDNISOLONE SODIUM SUCCINATE 125 MG: 125 INJECTION, POWDER, FOR SOLUTION INTRAMUSCULAR; INTRAVENOUS at 14:17

## 2019-01-03 RX ADMIN — HYDRALAZINE HYDROCHLORIDE 25 MG: 25 TABLET ORAL at 22:02

## 2019-01-03 RX ADMIN — IPRATROPIUM BROMIDE AND ALBUTEROL SULFATE 3 ML: .5; 3 SOLUTION RESPIRATORY (INHALATION) at 14:27

## 2019-01-03 RX ADMIN — ASPIRIN 81 MG: 81 TABLET, COATED ORAL at 17:46

## 2019-01-03 RX ADMIN — DOLUTEGRAVIR SODIUM 50 MG: 50 TABLET, FILM COATED ORAL at 22:03

## 2019-01-03 RX ADMIN — IPRATROPIUM BROMIDE AND ALBUTEROL SULFATE 3 ML: .5; 3 SOLUTION RESPIRATORY (INHALATION) at 14:17

## 2019-01-03 RX ADMIN — OMEGA-3-ACID ETHYL ESTERS 2 G: 1 CAPSULE, LIQUID FILLED ORAL at 20:46

## 2019-01-03 RX ADMIN — Medication 1 TABLET: at 22:02

## 2019-01-03 RX ADMIN — CLOPIDOGREL BISULFATE 75 MG: 75 TABLET, FILM COATED ORAL at 20:47

## 2019-01-03 RX ADMIN — MIRTAZAPINE 15 MG: 15 TABLET, FILM COATED ORAL at 22:03

## 2019-01-03 RX ADMIN — CARVEDILOL 12.5 MG: 12.5 TABLET, FILM COATED ORAL at 17:46

## 2019-01-03 RX ADMIN — IPRATROPIUM BROMIDE AND ALBUTEROL SULFATE 3 ML: .5; 3 SOLUTION RESPIRATORY (INHALATION) at 20:07

## 2019-01-03 RX ADMIN — INSULIN ASPART 1 UNITS: 100 INJECTION, SOLUTION INTRAVENOUS; SUBCUTANEOUS at 17:53

## 2019-01-03 RX ADMIN — MAGNESIUM OXIDE TAB 400 MG (241.3 MG ELEMENTAL MG) 400 MG: 400 (241.3 MG) TAB at 22:02

## 2019-01-03 RX ADMIN — HYDRALAZINE HYDROCHLORIDE 10 MG: 20 INJECTION INTRAMUSCULAR; INTRAVENOUS at 20:14

## 2019-01-03 RX ADMIN — IPRATROPIUM BROMIDE AND ALBUTEROL SULFATE 3 ML: .5; 3 SOLUTION RESPIRATORY (INHALATION) at 12:44

## 2019-01-03 RX ADMIN — PANTOPRAZOLE SODIUM 40 MG: 40 TABLET, DELAYED RELEASE ORAL at 17:46

## 2019-01-03 RX ADMIN — ACETAMINOPHEN 650 MG: 325 TABLET, FILM COATED ORAL at 17:12

## 2019-01-03 RX ADMIN — RITONAVIR 100 MG: 100 TABLET, FILM COATED ORAL at 22:03

## 2019-01-03 RX ADMIN — ASPIRIN 81 MG 324 MG: 81 TABLET ORAL at 14:39

## 2019-01-03 RX ADMIN — ABACAVIR 600 MG: 300 TABLET, FILM COATED ORAL at 20:46

## 2019-01-03 RX ADMIN — ATORVASTATIN CALCIUM 40 MG: 40 TABLET, FILM COATED ORAL at 22:03

## 2019-01-03 RX ADMIN — SENNOSIDES AND DOCUSATE SODIUM 1 TABLET: 8.6; 5 TABLET ORAL at 20:47

## 2019-01-03 RX ADMIN — HYDRALAZINE HYDROCHLORIDE 25 MG: 25 TABLET ORAL at 17:46

## 2019-01-03 RX ADMIN — ISOSORBIDE MONONITRATE 120 MG: 60 TABLET, EXTENDED RELEASE ORAL at 20:47

## 2019-01-03 ASSESSMENT — ENCOUNTER SYMPTOMS
VOMITING: 0
SHORTNESS OF BREATH: 1
DIARRHEA: 1
COUGH: 1
NAUSEA: 1

## 2019-01-03 ASSESSMENT — ACTIVITIES OF DAILY LIVING (ADL): ADLS_ACUITY_SCORE: 16

## 2019-01-03 NOTE — H&P
Full H and P Dictated    68-year-old gentleman with a history of end-stage renal disease on hemodialysis Tuesdays, Thursdays, Saturdays; HIV with CD4 count greater than 400 per the patient; hyperlipidemia; coronary artery disease with prior PCI; prior history of TIA, who gets periodic blood transfusions every 3-4 months, who was admitted to [facility] (12/5-12/13) For sepsis related to community acquired pneumonia.   Was initially treated with Zosyn Vanco and ultimately transition to oral Augmentin upon discharge.    Patient states that he was feeling fine until the last day or so when he is complaining of increasing shortness of breath and cough.  He was able to finish dialysis today but was so wheezy that the staff recommended he comes into the emergency room.    Workup in the ER reveals negative chest x-ray, no slight leukopenia.  He was also found to be anemic with hemoglobin 6.7 from the eights and nines baseline he was quite wheezy on exam, but did improve with duo nebs.      He is being admitted to hospital for acute respiratory distress without hypoxia related to COPD exacerbation, acute on chronic anemia.     Continue duo nebs 4 times daily, IV steroids  No antibiotic needed for now especially given new onset diarrhea  Check C. difficile given recent antibiotic use  Transfuse 1 unit of packed red blood cell  Discussed with Dr. Fraser.

## 2019-01-03 NOTE — ED NOTES
[facility]  ED Nurse Handoff Report    Donald Raza is a 68 year old male   ED Chief complaint: Cough and Diarrhea  . ED Diagnosis:   Final diagnoses:   Exacerbation of asthma, unspecified asthma severity, unspecified whether persistent   Elevated troponin - can not rule out ACS   Anemia, unspecified type   ESRD (end stage renal disease) on dialysis (H)     Allergies:   Allergies   Allergen Reactions     Calcium Acetate Other (See Comments)     Other reaction(s): Other, see comments  Pain in chest and back  Pain in chest area (sensitive skin)      Diagnostic X-Ray Materials Other (See Comments)     PN: renal failure     Lisinopril      Sulfa Drugs        Code Status: Full Code  Activity level - Baseline/Home:  Stand with Assist. Activity Level - Current:   Stand with Assist of 2. Lift room needed: No. Bariatric: No   Needed: No   Isolation: No. Infection: Not Applicable  AIDS.     Vital Signs:   Vitals:    01/03/19 1430 01/03/19 1445 01/03/19 1500 01/03/19 1515   BP:   138/60 156/65   Pulse:   82    Resp:       Temp:       TempSrc:       SpO2: 98% 94% 96% 90%   Weight:           Cardiac Rhythm:  ,   Cardiac  Cardiac Rhythm: Normal sinus rhythm  Pain level: 0-10 Pain Scale: 6  Patient confused: No. Patient Falls Risk: Yes.   Elimination Status: dialysis   Patient Report - Initial Complaint: Pt reports diarrhea and cough,symptoms started yesterday.  Pt dialyzed this morning and reports he's been feeling very week. Focused Assessment: Coarse, wet lung sounds, cough. Generalized weakness. No n/v. No diarrhea while here. Dialysis T, Th, Sat. Completed today's Dialysis.   Tests Performed: flu swab, labs, chest xray. Abnormal Results:   XR Chest 2 Views   Final Result   IMPRESSION: Heart size is normal. Pulmonary vasculature is normal.   Lungs are clear. No pleural fluid. No acute disease.      ASHOK ECHEVERRIA MD        Labs Ordered and Resulted from Time of ED Arrival Up to the Time of Departure  from the ED   CBC WITH PLATELETS DIFFERENTIAL - Abnormal; Notable for the following components:       Result Value    WBC 3.3 (*)     RBC Count 2.30 (*)     Hemoglobin 6.7 (*)     Hematocrit 21.9 (*)     MCHC 30.6 (*)     RDW 18.6 (*)     Platelet Count 103 (*)     Absolute Lymphocytes 0.3 (*)     All other components within normal limits   COMPREHENSIVE METABOLIC PANEL - Abnormal; Notable for the following components:    Glucose 138 (*)     Urea Nitrogen 39 (*)     Creatinine 5.52 (*)     GFR Estimate 10 (*)     GFR Estimate If Black 11 (*)     All other components within normal limits   TROPONIN I - Abnormal; Notable for the following components:    Troponin I ES 0.106 (*)     All other components within normal limits   NT PROBNP INPATIENT - Abnormal; Notable for the following components:    N-Terminal Pro BNP Inpatient 43,946 (*)     All other components within normal limits   LACTIC ACID WHOLE BLOOD   LACTIC ACID WHOLE BLOOD   PULSE OXIMETRY NURSING   CARDIAC CONTINUOUS MONITORING   PATIENT CARE ORDER   INFLUENZA A/B ANTIGEN   BLOOD CULTURE   BLOOD CULTURE     Treatments provided: ASA, solu-medrol, duo nebs  Family Comments: significant other at bedside  OBS brochure/video discussed/provided to patient:  N/A  ED Medications:   Medications   ipratropium - albuterol 0.5 mg/2.5 mg/3 mL (DUONEB) neb solution 3 mL (3 mLs Nebulization Given 1/3/19 1244)   ipratropium - albuterol 0.5 mg/2.5 mg/3 mL (DUONEB) neb solution 3 mL (3 mLs Nebulization Given 1/3/19 1427)   methylPREDNISolone sodium succinate (solu-MEDROL) injection 125 mg (125 mg Intravenous Given 1/3/19 1417)   aspirin (ASA) chewable tablet 324 mg (324 mg Oral Given 1/3/19 1439)     Drips infusing:  No  For the majority of the shift, the patient's behavior Green. Interventions performed were n/a.     Severe Sepsis OR Septic Shock Diagnosis Present: No      ED Nurse Name/Phone Number: Michelle Christensen,   3:48 PM    RECEIVING UNIT ED HANDOFF REVIEW    Above  ED Nurse Handoff Report was reviewed: Yes  Reviewed by: Ember Adam on January 3, 2019 at 4:16 PM

## 2019-01-03 NOTE — LETTER
Transition Communication Hand-off for Care Transitions to Next Level of Care Provider    Name: Donald Raza  : 1950  MRN #: [id number]  Primary Care Provider: Aida Thomas  Primary Care MD Name: Dr. Aida Thomas  Primary Clinic: [facility] [address] [place] [id number]  Primary Care Clinic Name: [facility] [place]  Reason for Hospitalization:  Elevated troponin [R74.8]  ESRD (end stage renal disease) on dialysis (H) [N18.6, Z99.2]  Anemia, unspecified type [D64.9]  Exacerbation of asthma, unspecified asthma severity, unspecified whether persistent [J45.901]  Admit Date/Time: 1/3/2019 12:07 PM  Discharge Date:   Payor Source: Payor: MEDICARE / Plan: MEDICARE / Product Type: Medicare /     Readmission Assessment Measure (MUSHTAQ) Risk Score/category: Elevated  Discharge Needs Assessment:  Needs      Most Recent Value   Equipment Currently Used at Home  cane, straight   # of Referrals Placed by CTS  Communication hand-offs to next level of Care Providers   Other Resources  -- [Pt declined COPD Action Plan,  has multiple copies. ]   Home Care  [facility] [phone], Fax: [phone]        Follow-up plan:  No future appointments.    Any outstanding tests or procedures:        Referrals     Future Labs/Procedures    Home care nursing referral     Comments:    RN skilled nursing visit. RN to assess vital signs and weight, respiratory and cardiac status and patients ability to take and record daily blood pressure, temp and weight.  RN to teach medication management.  RN to provide assessment of blood glucose control and management of complex drugs.    Your provider has ordered home care nursing services. If you have not been contacted within 2 days of your discharge please call the inpatient department phone number at [phone] .    Home Care PT Referral for Hospital Discharge     Comments:    PT to eval and treat    Your provider has ordered home care -  physical therapy. If you have not been contacted within 2 days of your discharge please call the department phone number listed on the top of this document.            CTS identifies patient as high risk due to elevated MUSHTAQ score. Currently admitted for COPD exacerbation, this is his 4th admission in 6 months. Per chart review, pt resides at home with his wife, Jennifer, daughter & brother. Baseline mobility is he ambulates with a cane. His daughter is his PCA 9hrs/day M-F. He attends HD T, Th & Sat at [facility]. His AV fistual is on [facility].   He has greater than 10 prescription medications. An MTM referral was made on his last admission (12/14/18) and it was transferred to the [facility] system. He was reactivated with [facility] for RN & PT.       Pt was dc'd back home with [facility] RN/PT services. His new medication is for prednisone. He could benefit from clinic care coordination to follow with his complex needs and frequent admissions.     Ruby Hinojosa RN, BSN, CTS

## 2019-01-03 NOTE — ED PROVIDER NOTES
History     Chief Complaint:  Cough and Diarrhea      HPI   Donald Raza is a 68 year old male with a history of end-stage renal disease on hemodialysis Tuesdays, Thursdays, Saturdays; HIV ( CD4 count greater than 400 per patient (reported on 12/5/18); hyperlipidemia; coronary artery disease with prior PCI; prior history of TIA, who gets periodic blood transfusions every 3-4 months, came with symptoms of a non productive cough and shortness of breath. He reports that the cough started yesterday. He also reports some diarrhea which started yesterday. He reports some nausea but denies vomiting. He has been drinking some water. He reports 3 episodes of diarrhea. He denies noticing any blood in diarrhea. The patient reports a history of pneumonia about 3 weeks ago. He is taking all the medications listed below. He denies any travel outside of the country recently. The patient is a dialysis patient, he does this at [facility]. He states that he was not feeling good during his dialysis but was able to complete it. He uses breathing treatments at home. He does not recall the last time he was on steroids.    Allergies:  Calcium Acetate  Diagnostic X-Ray Materials  Lisinopril  Sulfa Drugs     Medications:    Abacavir (Ziagen) 300 Mg Tablet  Acetaminophen (Tylenol Po)  Albuterol (Proair Hfa/Proventil Hfa/Ventolin Hfa) 108 (90 Base) Mcg/Act Inhaler  Aspirin Ec 81 Mg Ec Tablet  Atorvastatin (Lipitor) 40 Mg Tablet  B Complex-C-Folic Acid Po  Carvedilol (Coreg) 12.5 Mg Tablet  Chlorhexidine (Chlorhexidine) 0.12 % Solution  Clonazepam Po  Clopidogrel Bisulfate Po  Dapsone 100 Mg Tablet  Darunavir Ethanolate (Prezista Po)  Dolutegravir (Tivicay) 50 Mg Tablet  Doxercalciferol Iv  Epoetin Dwayne (Epogen,Procrit) 92515 Unit/Ml Injection  Gabapentin (Neurontin) 300 Mg Capsule  Guaifenesin (Mucinex) 600 Mg 12 Hr Tablet  Hydralazine (Apresoline) 25 Mg Tablet  Imiquimod (Aldara) 5 % Cream  Insulin Glargine (Lantus) 100 Unit/Ml  Injection  Insulin Lispro (Humalog Pen Sc)  Insulin Lispro (Humalog Pen) 100 Unit/Ml Pen  Ipratropium - Albuterol 0.5 Mg/2.5 Mg/3 Ml (Duoneb) 0.5-2.5 (3) Mg/3ml Neb Solution  Irbesartan (Avapro) 300 Mg Tablet  Iron Sucrose (Venofer) 20 Mg/Ml Injection  Isosorbide Mononitrate Cr 120 Mg Tb24  Ketoconazole (Nizoral) 2 % Cream  Magnesium Oxide Po  Mirtazapine (Remeron) 15 Mg Tablet  Nitroglycerin (Nitrostat) 0.4 Mg Sl Tablet  Nutritional Supplements (Nepro Po)  Omega-3 Acid Ethyl Esters (Lovaza) 1 G Capsule  Order For Dme  Pantoprazole (Protonix) 40 Mg Ec Tablet  Ritonavir (Norvir) 100 Mg Capsule  Sucroferric Oxyhydroxide (Velphoro) 500 Mg Chew Chewable Tablet     Past Medical History:    ACS (acute coronary syndrome) (H)   Allergic rhinitis, cause unspecified   Anemia due to chronic kidney disease   Bilateral pneumonia   Huang disease   Bradycardia   CAD S/P percutaneous coronary angioplasty   Cancer (H)   Chest pain   Dilated aortic root (H)   ESRD (end stage renal disease) on dialysis (H)   Generalized muscle weakness   Hemodialysis-associated hypotension   Human immunodeficiency virus (HIV) disease (H)   Hypertension   Hypotension, unspecified hypotension type   Impotence of organic origin   Increased prostate specific antigen (PSA) velocity   Mixed hyperlipidemia   Near syncope   NSTEMI (non-ST elevated myocardial infarction) (H)   Pulmonary HTN (H)   TIA (transient ischemic attack)   Type 2 diabetes mellitus (H)   Unstable angina (H)     Past Surgical History:    Angiogram No Culprit Lesions, Stents Widely Patent   Angiogram  Cutting Balloon Ptca=Diag  Appendectomy   Cholecystectomy, Laporoscopic    Colostomy  Temporary For Diverticulitis  Hc Left Heart Catheterization   Heart Cath, Angioplasty Lad Pci. Stented With A 3.0 X 8 Mm Xience Alpine Stent.  Stent, Coronary George=Diag, Ptca=Lad  Stent, Coronary George=Lad    Family History:    Heart disease  Kidney disease  Hypertension    Social History:  The patient  reports  that he quit smoking about 16 years ago. His smoking use included cigarettes. He has a 82.00 pack-year smoking history. he has never used smokeless tobacco. He reports that he does not drink alcohol or use drugs.   Marital Status:   [2]    Review of Systems   Respiratory: Positive for cough and shortness of breath.    Cardiovascular: Negative for chest pain.        Intermittent anterior chest pressure since yesterday.    Gastrointestinal: Positive for diarrhea and nausea. Negative for vomiting.   All other systems reviewed and are negative.      Physical Exam     Vital signs  Patient Vitals for the past 24 hrs:   BP Temp Temp src Pulse Heart Rate Resp SpO2 Weight   01/03/19 1515 156/65 -- -- -- -- -- 90 % --   01/03/19 1500 138/60 -- -- 82 -- -- 96 % --   01/03/19 1445 -- -- -- -- -- -- 94 % --   01/03/19 1430 -- -- -- -- -- -- 98 % --   01/03/19 1415 164/73 -- -- -- -- -- 93 % --   01/03/19 1400 156/67 -- -- 86 -- -- 93 % --   01/03/19 1345 168/78 -- -- 81 -- -- 97 % --   01/03/19 1315 -- -- -- -- -- -- 95 % --   01/03/19 1300 -- -- -- -- -- -- 98 % --   01/03/19 1245 -- -- -- -- -- -- 98 % --   01/03/19 1144 168/79 99.1  F (37.3  C) Oral -- 84 20 99 % 86 kg (189 lb 9.5 oz)          Physical Exam    Nursing note and vitals reviewed.    Constitutional: Appears to feel poorly.          HENT:    Mouth/Throat: Oropharynx is without swelling or erythema. Oral mucosa moist.    Eyes: Conjunctivae are normal. No scleral icterus.    Neck: Neck supple.   Cardiovascular: Normal rate, regular rhythm and intact distal pulses.    Pulmonary/Chest: Course wet sounding cough. Diffuse wheezing. Diminished breath sounds at the right lung base.     Abdominal: Soft.  No distension. There is no tenderness.   Musculoskeletal:  No edema, No calf tenderness. Dialysis access to left arm with intact thrill and pulse.   Neurological:Alert and answering questions appropriately. Coordination normal. Symmetric strength in all 4  extremities  Skin: Skin is warm and dry.   Psychiatric: Normal mood and affect.   Emergency Department Course   ECG (12:01:02): #1  Rate 86 bpm. ID interval 166. QRS duration 98. QT/QTc 402/481. P-R-T axes 39 8 97. He has not experienced any significant side effects of this medication.. Minimal voltage criteria for LVH, may be normal variant. Abnormal WR-T angle, consider primary T wave abnormality. Prolonged QT. Abnormal EKG.  Interpreted at 1226 by Soraya Nixon MD*.     ECG (14:28:24): #2  Rate 77 bpm. ID interval 158. QRS duration 98. QT/QTc 418/473. P-R-T axes 15 -1 103. Normal sinus rhythm.. Minimal voltage criteria for LVH, may be normal variant. ST & T wave abnormality, consider latearl ischemia. Abnormal EKG. Chest pain resolved. Interpreted at 1440 by Soraya Nixon MD.     Imaging:  XR Chest 2 Views   Final Result   IMPRESSION: Heart size is normal. Pulmonary vasculature is normal.   Lungs are clear. No pleural fluid. No acute disease.      ASHOK ECHEVERRIA MD        Results as read by radiology.   I communicated the results of the imaging studies with the patient who expressed understanding of these findings.      Laboratory:  Blood Culture: pending x 2     CBC: WBC 3.3, RBC 2.30, HGB 6.7, 'HCT 21.9, MCHC 30.6, RDW 18.6, , Absolute Lymphocytes 0.3,  Otherwise within normal limits  CMP: Glucose 138, BUN 39, Creatinine 5.52, GFR Estimate 10, otherwise within normal limits  Troponin 0.106    Interventions:  1244: Albuterol, 2.5mg/3 mL, Inhalation solution, Nebulizer   1417: Albuterol, 2.5mg/3 mL, Inhalation solution, Nebulizer   1417: Solu-Medrol, 125 mg, IV  1427: Albuterol, 2.5mg/3 mL, Inhalation solution, Nebulizer   1439: Aspirin 324mg  PO      Emergency Department Course:  Past medical records, nursing notes, and vitals reviewed.  1212: I performed an exam of the patient and obtained history, as documented above.        1357 I was notified of his critical HGB result(6.7). Upon  Chart review  he had a HGB of 7.1 on 12/28.    1403: I rechecked the patient. He informs me that the chest pain started yesterday. It has resolved with a neb here in the ED. He states that his breathing has also imrpoved a little but he is still very wheezy.    Findings and plan explained to the Patient and son who consents to admission.   I consulted with Dr. Magdaleno, cardiology. Agreed with plan to defer additional anticoagulation. Will admit and transfuse PRBCs.     1504: Discussed the patient with Soraya Conley for Dr. Fraser, who will admit the patient to a Cardiac telemetry bed for further monitoring, evaluation, and treatment.    Impression & Plan    Medical Decision Making:  Mr. Raza is a 68 y.o male with a complicated past medical history including ESRD on HD, HIV/AIDS and CAD who presents with cough, shortness of breath and chest pain. The differential diagnosis included but was not limited to pneumonia, COPD exacerbation, ACD, sepsis, dehydration. ED evaluation is as noted above. He has significant wheezing on arrival with improving SOB with nebs in the ED. C xray was negative for pneumonia. Hbg was 6.7 compared with recent level of 7.1. Additionally he was noted to have an elevated troponin without EKG changes. I believe that his chest pain is most likely related to underlying lung disease and that the elevated troponin is likely due to demand ischemia. Additionally I am reassured by a negative stress test last month. I consulted with Dr. Vargas of cardiology who agrees with plan to defer additional anticoagulation. Soraya Conley has accepted the patient for admission and will transfuse 1 unit PRBC      Critical Care time:  none    Diagnosis:    ICD-10-CM    1. Exacerbation of asthma, unspecified asthma severity, unspecified whether persistent J45.901 Blood culture     Nt probnp inpatient     Nt probnp inpatient     CANCELED: BNP   2. Elevated troponin R74.8     can not rule out ACS   3. Anemia,  unspecified type D64.9    4. ESRD (end stage renal disease) on dialysis (H) N18.6     Z99.2        Disposition:  Admitted to Cardiac Telemetry.     Discharge Medications:     Medication List      There are no discharge medications for this visit.       ITiffani, am serving as a scribe at 3:05 PM on 1/3/2019 to document services personally performed by Soraya Nixon* based on my observations and the provider's statements to me.    [facility] EMERGENCY DEPARTMENT       Soraya Nixon MD  01/03/19 2044

## 2019-01-03 NOTE — PHARMACY-ADMISSION MEDICATION HISTORY
Admission medication history interview status for this patient is complete. See [facility] admission navigator for allergy information, prior to admission medications and immunization status.     Medication history interview source(s):Patient and Family  Medication history resources (including written lists, pill bottles, clinic record):[facility] list  Primary pharmacy:[facility]    Changes made to [facility] medication list:  Added: none  Deleted: none  Changed: none    Actions taken by pharmacist (provider contacted, etc):As above     Additional medication history information:Harika takes most of his medicatios in the evening. No changes in med list from previous admission per patient info.    Medication reconciliation/reorder completed by provider prior to medication history? Yes    For patients on insulin therapy: YES  Lantus 25 units twice daily   Sliding scale Humalog: Yes  If Yes, do you have a baseline novolog pre-meal dose:: about 15units  + SSwith meals   Patients eat three meals a day: unknown  Any Barriers to therapy: cost of medications/comfortable with giving injections (if applicable)/ comfortable and confident with current diabetes regimen     Prior to Admission medications    Medication Sig Last Dose Taking? Auth Provider   abacavir (ZIAGEN) 300 MG tablet Take 600 mg by mouth every evening  1/2/2019 at pm Yes Abstract, Provider   Acetaminophen (TYLENOL PO) Take 325 mg by mouth every 6 hours as needed for mild pain or fever  1/2/2019 at pm Yes Unknown, Entered By History   albuterol (PROAIR HFA/PROVENTIL HFA/VENTOLIN HFA) 108 (90 BASE) MCG/ACT Inhaler Inhale 2 puffs into the lungs every 6 hours as needed for shortness of breath / dyspnea or wheezing 1/2/2019 at Unknown time Yes Nelson Worthy MD   aspirin EC 81 MG EC tablet Take 1 tablet (81 mg) by mouth daily 1/2/2019 at a.m Yes Clemencia Pal MD   atorvastatin (LIPITOR) 40 MG tablet Take 40 mg by mouth At Bedtime 1/2/2019 at pm  Yes Unknown, Entered By  History   B COMPLEX-C-FOLIC ACID PO Take 1 tablet by mouth At Bedtime  1/2/2019 at pm Yes Reported, Patient   carvedilol (COREG) 12.5 MG tablet Take 1 tablet (12.5 mg) by mouth 2 times daily (with meals) 1/2/2019 at pm Yes Clemencia Pal MD   chlorhexidine (CHLORHEXIDINE) 0.12 % solution Rinse and gargle 15 ml by mouth twice a day as directed. 1/2/2019 at pm Yes Abstract, Provider   CLONAZEPAM PO Take 0.5 mg by mouth nightly as needed for anxiety (restless legs) 1/2/2019 at pm  Yes Unknown, Entered By History   CLOPIDOGREL BISULFATE PO Take 75 mg by mouth every evening  1/2/2019 at pm Yes Unknown, Entered By History   dapsone 100 MG tablet Take 100 mg by mouth At Bedtime  1/2/2019 at pm Yes Reported, Patient   Darunavir Ethanolate (PREZISTA PO) Take 800 mg by mouth At Bedtime. 1/2/2019 at pm Yes Reported, Patient   dolutegravir (TIVICAY) 50 MG tablet Take 50 mg by mouth At Bedtime 1/2/2019 at pm Yes Unknown, Entered By History   gabapentin (NEURONTIN) 300 MG capsule Take 300 mg by mouth 2 times daily May take a third dose after dialysis. 1/2/2019 at pm  Yes Reported, Patient   guaiFENesin (MUCINEX) 600 MG 12 hr tablet Take by mouth as needed for congestion unknown Yes Unknown, Entered By History   hydrALAZINE (APRESOLINE) 25 MG tablet Take 1 tablet (25 mg) by mouth 3 times daily 1/2/2019 at pm Yes Lucita Downing MD   imiquimod (ALDARA) 5 % cream Apply topically as needed unknown Yes Reported, Patient   insulin glargine (LANTUS) 100 UNIT/ML injection Inject 25 Units Subcutaneous 2 times daily  1/2/2019 at pm Yes Unknown, Entered By History   Insulin Lispro (HUMALOG PEN SC) Take 15 units TID and an additional 2 units for every 50 mg/dL if BG is over 150 1/2/2019 at pm Yes Unknown, Entered By History   insulin lispro (HUMALOG PEN) 100 UNIT/ML pen Inject Subcutaneous 3 times daily (before meals) Sliding scale: takes 2 units for every 50 mg/dL over 150. 1/2/2019 at pm Yes Unknown, Entered By History   ipratropium  "- albuterol 0.5 mg/2.5 mg/3 mL (DUONEB) 0.5-2.5 (3) MG/3ML neb solution Take 1 vial (3 mLs) by nebulization every 6 hours as needed for shortness of breath / dyspnea or wheezing 1/2/2019 at Unknown time Yes Catrachito Rosado,    irbesartan (AVAPRO) 300 MG tablet Take 1 tablet (300 mg) by mouth At Bedtime 1/2/2019 at pm Yes Clemencia Pal MD   iron sucrose (VENOFER) 20 MG/ML injection Inject 50 mg into the vein once a week WIth dialysis Past Week at Unknown time Yes Unknown, Entered By History   Isosorbide Mononitrate  MG TB24 Take 1 tablet (120 mg) by mouth every evening 1/2/2019 at pm Yes Yuki Hinojosa APRN CNP   ketoconazole (NIZORAL) 2 % cream Apply topically 2 times daily as needed unknown Yes Reported, Patient   MAGNESIUM OXIDE PO Take 400 mg by mouth At Bedtime 1/2/2019 at pm Yes Unknown, Entered By History   mirtazapine (REMERON) 15 MG tablet Take 15 mg by mouth At Bedtime 1/2/2019 at pm Yes Reported, Patient   nitroglycerin (NITROSTAT) 0.4 MG SL tablet One tablet under the tongue every 5 minutes if needed for chest pain. May repeat every 5 minutes for a maximum of 3 doses in 15 minutes\" unknown Yes Lay Paz MD   Nutritional Supplements (NEPRO PO) 1-3 times daily 1/2/2019 at Unknown time Yes Unknown, Entered By History   omega-3 acid ethyl esters (LOVAZA) 1 G capsule Take 2 g by mouth 2 times daily 1/2/2019 at pm Yes Unknown, Entered By History   pantoprazole (PROTONIX) 40 MG EC tablet Take 40 mg by mouth daily 1/2/2019 at a.m Yes Unknown, Entered By History   ritonavir (NORVIR) 100 MG capsule Take 1 capsule by mouth At Bedtime  1/2/2019 at pm Yes Reported, Patient   sucroferric oxyhydroxide (VELPHORO) 500 MG CHEW chewable tablet Take 500 mg by mouth 3 times daily (with meals) 1/2/2019 at pm Yes Unknown, Entered By History   DOXERCALCIFEROL IV Inject 6 mcg into the vein three times a week (with dialysis) 1/27/2018 at On T, TH and Sat with dialysis  Reported, Patient   epoetin brittni " (EPOGEN,PROCRIT) 68177 UNIT/ML injection Inject 11,000 Units Subcutaneous three times a week WITH DIALYSIS 1/27/2018 at  as above  Unknown, Entered By History   order for DME Equipment being ordered: Other: 4WW  Treatment Diagnosis: Decreased activity tolerance   Lorna Jhaveri MD

## 2019-01-04 LAB
ANION GAP SERPL CALCULATED.3IONS-SCNC: 13 MMOL/L (ref 3–14)
BUN SERPL-MCNC: 60 MG/DL (ref 7–30)
CALCIUM SERPL-MCNC: 8.8 MG/DL (ref 8.5–10.1)
CHLORIDE SERPL-SCNC: 96 MMOL/L (ref 94–109)
CO2 SERPL-SCNC: 23 MMOL/L (ref 20–32)
CREAT SERPL-MCNC: 7.76 MG/DL (ref 0.66–1.25)
ERYTHROCYTE [DISTWIDTH] IN BLOOD BY AUTOMATED COUNT: 17.5 % (ref 10–15)
GFR SERPL CREATININE-BSD FRML MDRD: 6 ML/MIN/{1.73_M2}
GLUCOSE BLDC GLUCOMTR-MCNC: 176 MG/DL (ref 70–99)
GLUCOSE BLDC GLUCOMTR-MCNC: 188 MG/DL (ref 70–99)
GLUCOSE BLDC GLUCOMTR-MCNC: 215 MG/DL (ref 70–99)
GLUCOSE BLDC GLUCOMTR-MCNC: 273 MG/DL (ref 70–99)
GLUCOSE BLDC GLUCOMTR-MCNC: 283 MG/DL (ref 70–99)
GLUCOSE SERPL-MCNC: 179 MG/DL (ref 70–99)
HCT VFR BLD AUTO: 24.1 % (ref 40–53)
HGB BLD-MCNC: 7.4 G/DL (ref 13.3–17.7)
MCH RBC QN AUTO: 29 PG (ref 26.5–33)
MCHC RBC AUTO-ENTMCNC: 30.7 G/DL (ref 31.5–36.5)
MCV RBC AUTO: 95 FL (ref 78–100)
PLATELET # BLD AUTO: 94 10E9/L (ref 150–450)
POTASSIUM SERPL-SCNC: 4.4 MMOL/L (ref 3.4–5.3)
RBC # BLD AUTO: 2.55 10E12/L (ref 4.4–5.9)
SODIUM SERPL-SCNC: 132 MMOL/L (ref 133–144)
TROPONIN I SERPL-MCNC: 0.05 UG/L (ref 0–0.04)
TROPONIN I SERPL-MCNC: 0.05 UG/L (ref 0–0.04)
WBC # BLD AUTO: 2.5 10E9/L (ref 4–11)

## 2019-01-04 PROCEDURE — 36415 COLL VENOUS BLD VENIPUNCTURE: CPT | Performed by: INTERNAL MEDICINE

## 2019-01-04 PROCEDURE — 25000125 ZZHC RX 250: Performed by: INTERNAL MEDICINE

## 2019-01-04 PROCEDURE — 25000128 H RX IP 250 OP 636: Performed by: PHYSICIAN ASSISTANT

## 2019-01-04 PROCEDURE — 80048 BASIC METABOLIC PNL TOTAL CA: CPT | Performed by: PHYSICIAN ASSISTANT

## 2019-01-04 PROCEDURE — 85027 COMPLETE CBC AUTOMATED: CPT | Performed by: PHYSICIAN ASSISTANT

## 2019-01-04 PROCEDURE — A9270 NON-COVERED ITEM OR SERVICE: HCPCS | Mod: GY | Performed by: PHYSICIAN ASSISTANT

## 2019-01-04 PROCEDURE — 93010 ELECTROCARDIOGRAM REPORT: CPT | Performed by: INTERNAL MEDICINE

## 2019-01-04 PROCEDURE — 94640 AIRWAY INHALATION TREATMENT: CPT

## 2019-01-04 PROCEDURE — 36415 COLL VENOUS BLD VENIPUNCTURE: CPT | Performed by: PHYSICIAN ASSISTANT

## 2019-01-04 PROCEDURE — 94640 AIRWAY INHALATION TREATMENT: CPT | Mod: 76

## 2019-01-04 PROCEDURE — 00000146 ZZHCL STATISTIC GLUCOSE BY METER IP

## 2019-01-04 PROCEDURE — 25000132 ZZH RX MED GY IP 250 OP 250 PS 637: Mod: GY | Performed by: PHYSICIAN ASSISTANT

## 2019-01-04 PROCEDURE — 40000275 ZZH STATISTIC RCP TIME EA 10 MIN

## 2019-01-04 PROCEDURE — 99233 SBSQ HOSP IP/OBS HIGH 50: CPT | Performed by: INTERNAL MEDICINE

## 2019-01-04 PROCEDURE — 84484 ASSAY OF TROPONIN QUANT: CPT | Performed by: INTERNAL MEDICINE

## 2019-01-04 PROCEDURE — 25000131 ZZH RX MED GY IP 250 OP 636 PS 637: Mod: GY | Performed by: INTERNAL MEDICINE

## 2019-01-04 PROCEDURE — 84484 ASSAY OF TROPONIN QUANT: CPT | Performed by: PHYSICIAN ASSISTANT

## 2019-01-04 PROCEDURE — 93005 ELECTROCARDIOGRAM TRACING: CPT

## 2019-01-04 PROCEDURE — 12000000 ZZH R&B MED SURG/OB

## 2019-01-04 RX ADMIN — ATORVASTATIN CALCIUM 40 MG: 40 TABLET, FILM COATED ORAL at 22:19

## 2019-01-04 RX ADMIN — MIRTAZAPINE 15 MG: 15 TABLET, FILM COATED ORAL at 22:19

## 2019-01-04 RX ADMIN — CLOPIDOGREL BISULFATE 75 MG: 75 TABLET, FILM COATED ORAL at 19:51

## 2019-01-04 RX ADMIN — STANDARDIZED SENNA CONCENTRATE AND DOCUSATE SODIUM 2 TABLET: 8.6; 5 TABLET, FILM COATED ORAL at 22:15

## 2019-01-04 RX ADMIN — HYDRALAZINE HYDROCHLORIDE 25 MG: 25 TABLET ORAL at 08:43

## 2019-01-04 RX ADMIN — HYDRALAZINE HYDROCHLORIDE 25 MG: 25 TABLET ORAL at 15:45

## 2019-01-04 RX ADMIN — RITONAVIR 100 MG: 100 TABLET, FILM COATED ORAL at 22:31

## 2019-01-04 RX ADMIN — ISOSORBIDE MONONITRATE 120 MG: 60 TABLET, EXTENDED RELEASE ORAL at 19:51

## 2019-01-04 RX ADMIN — INSULIN ASPART 2 UNITS: 100 INJECTION, SOLUTION INTRAVENOUS; SUBCUTANEOUS at 12:50

## 2019-01-04 RX ADMIN — CARVEDILOL 12.5 MG: 12.5 TABLET, FILM COATED ORAL at 18:02

## 2019-01-04 RX ADMIN — DOLUTEGRAVIR SODIUM 50 MG: 50 TABLET, FILM COATED ORAL at 22:31

## 2019-01-04 RX ADMIN — METHYLPREDNISOLONE SODIUM SUCCINATE 40 MG: 40 INJECTION, POWDER, FOR SOLUTION INTRAMUSCULAR; INTRAVENOUS at 00:13

## 2019-01-04 RX ADMIN — INSULIN ASPART 1 UNITS: 100 INJECTION, SOLUTION INTRAVENOUS; SUBCUTANEOUS at 08:44

## 2019-01-04 RX ADMIN — GABAPENTIN 300 MG: 300 CAPSULE ORAL at 22:19

## 2019-01-04 RX ADMIN — IRBESARTAN 300 MG: 300 TABLET ORAL at 22:22

## 2019-01-04 RX ADMIN — IPRATROPIUM BROMIDE AND ALBUTEROL SULFATE 3 ML: .5; 3 SOLUTION RESPIRATORY (INHALATION) at 21:09

## 2019-01-04 RX ADMIN — ABACAVIR 600 MG: 300 TABLET, FILM COATED ORAL at 19:51

## 2019-01-04 RX ADMIN — HYDRALAZINE HYDROCHLORIDE 10 MG: 20 INJECTION INTRAMUSCULAR; INTRAVENOUS at 03:10

## 2019-01-04 RX ADMIN — OMEGA-3-ACID ETHYL ESTERS 2 G: 1 CAPSULE, LIQUID FILLED ORAL at 22:17

## 2019-01-04 RX ADMIN — DAPSONE 100 MG: 100 TABLET ORAL at 22:19

## 2019-01-04 RX ADMIN — PANTOPRAZOLE SODIUM 40 MG: 40 TABLET, DELAYED RELEASE ORAL at 08:43

## 2019-01-04 RX ADMIN — GABAPENTIN 300 MG: 300 CAPSULE ORAL at 08:43

## 2019-01-04 RX ADMIN — HYDRALAZINE HYDROCHLORIDE 25 MG: 25 TABLET ORAL at 22:15

## 2019-01-04 RX ADMIN — IPRATROPIUM BROMIDE AND ALBUTEROL SULFATE 3 ML: .5; 3 SOLUTION RESPIRATORY (INHALATION) at 16:01

## 2019-01-04 RX ADMIN — CARVEDILOL 12.5 MG: 12.5 TABLET, FILM COATED ORAL at 08:43

## 2019-01-04 RX ADMIN — INSULIN GLARGINE 25 UNITS: 100 INJECTION, SOLUTION SUBCUTANEOUS at 08:44

## 2019-01-04 RX ADMIN — ASPIRIN 81 MG: 81 TABLET, COATED ORAL at 08:43

## 2019-01-04 RX ADMIN — METHYLPREDNISOLONE SODIUM SUCCINATE 40 MG: 40 INJECTION, POWDER, FOR SOLUTION INTRAMUSCULAR; INTRAVENOUS at 22:25

## 2019-01-04 RX ADMIN — INSULIN GLARGINE 25 UNITS: 100 INJECTION, SOLUTION SUBCUTANEOUS at 22:31

## 2019-01-04 RX ADMIN — ACETAMINOPHEN 650 MG: 325 TABLET, FILM COATED ORAL at 15:45

## 2019-01-04 RX ADMIN — DARUNAVIR 800 MG: 800 TABLET, FILM COATED ORAL at 22:31

## 2019-01-04 RX ADMIN — METHYLPREDNISOLONE SODIUM SUCCINATE 40 MG: 40 INJECTION, POWDER, FOR SOLUTION INTRAMUSCULAR; INTRAVENOUS at 09:35

## 2019-01-04 RX ADMIN — IPRATROPIUM BROMIDE AND ALBUTEROL SULFATE 3 ML: .5; 3 SOLUTION RESPIRATORY (INHALATION) at 11:52

## 2019-01-04 RX ADMIN — INSULIN ASPART 3 UNITS: 100 INJECTION, SOLUTION INTRAVENOUS; SUBCUTANEOUS at 18:06

## 2019-01-04 RX ADMIN — Medication 1 TABLET: at 22:19

## 2019-01-04 RX ADMIN — OMEGA-3-ACID ETHYL ESTERS 2 G: 1 CAPSULE, LIQUID FILLED ORAL at 08:43

## 2019-01-04 RX ADMIN — IPRATROPIUM BROMIDE AND ALBUTEROL SULFATE 3 ML: .5; 3 SOLUTION RESPIRATORY (INHALATION) at 07:30

## 2019-01-04 RX ADMIN — MAGNESIUM OXIDE TAB 400 MG (241.3 MG ELEMENTAL MG) 400 MG: 400 (241.3 MG) TAB at 22:18

## 2019-01-04 ASSESSMENT — ACTIVITIES OF DAILY LIVING (ADL)
ADLS_ACUITY_SCORE: 16
ADLS_ACUITY_SCORE: 17
ADLS_ACUITY_SCORE: 16
ADLS_ACUITY_SCORE: 16

## 2019-01-04 NOTE — PROGRESS NOTES
Infection Prevention:    Patient placed on Enteric precautions because of loose stool with possible infectious etiology. Please contact Infection Prevention with any questions/concerns at [phone].    Margaret De Jesus, ICP

## 2019-01-04 NOTE — CONSULTS
[facility]    RENAL CONSULTATION NOTE    REFERRING MD:  Soraya Conley PA-C    REASON FOR CONSULTATION:  ESKD, SOB    DATE OF CONSULTATION: 01/04/19    SHORTHAND KEY FOR MY NOTES:  c = with, s = without, p = after, a = before, x = except, asx = asymptomatic, tx = transplant or treatment, sx = symptoms or symptomatic, cx = canceled or culture, rxn = reaction, yday = yesterday, nl = normal, abx = antibiotics, fxn = function, dx = diagnosis, dz = disease, m/h = melena/hematochezia, c/d/l/ha = cramping/dizziness/lightheadedness/headache, d/c = discharge or diarrhea/constipation, f/c/n/v = fevers/chills/nausea/vomiting, cp/sob = chest pain/shortness of breath.    HPI: Donald Raza is a 68 year old male c ESKD 2 DM2 who dialyses TRS via a LAF at the [facility] under the care of Dr. Chavez ([facility]) and was admitted on 1/3/2019 c sob and found to have worsening anemia.    Pt was at HD yday and completed his run.  After the run, he felt sob despite having 4L of fluid removed.  He didn't have f/c and the cough was mostly non-productive.  In addition, he had some diarrhea, but no abd pain or bloody stools. Since he didn't feel well, he decided to come to the ER for further eval.       In the ER, the pt was found to be wheezy and was tx c nebs.  His labs were mostly unremarkable x for a slightly elevated trop and a hb of 6.7.  He was admitted for a blood transfusion and observation.    Today, he feels much better, but is still weak.      ROS:  A complete review of systems was performed and is negative x as noted above.    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, recurrent     Bradycardia 5/28/2016     CAD S/P percutaneous coronary angioplasty 6/15/2015     Cancer  (H)      Chest pain      Dilated aortic root (H) 5/6/2016     ESRD (end stage renal disease) on dialysis (H) 5/6/2016     Generalized muscle weakness 9/8/2018     Hemodialysis-associated hypotension 5/22/2016     Human immunodeficiency virus (HIV) disease (H)      Hypertension 2010     Hypotension, unspecified hypotension type 5/22/2016     Impotence of organic origin      Increased prostate specific antigen (PSA) velocity 08/08/2016    Awaiting bx on blood thinner     Mixed hyperlipidemia      Near syncope 2016    with hemodialysis     NSTEMI (non-ST elevated myocardial infarction) (H) 12/2015, 5/2016     Pulmonary HTN (H)     Mod     TIA (transient ischemic attack) 5/2016     Type 2 diabetes mellitus (H) age 50     Unstable angina (H) 3/4/2016     PSH:    Past Surgical History:   Procedure Laterality Date     ANGIOGRAM  03-04-16    No culprit lesions, stents widely patent      ANGIOGRAM  05-06-16    Cutting balloon ptca=Diag     APPENDECTOMY  2000     CHOLECYSTECTOMY, LAPOROSCOPIC       COLOSTOMY  09/30/1999    Temporary for diverticulitis     HC LEFT HEART CATHETERIZATION  03/12/2018    No significant change  Elevated LVEDp  LVEF 30% No PCI     HEART CATH, ANGIOPLASTY  08-18-16    LAD PCI. Stented with a 3.0 x 8 mm Xience Alpine stent.     STENT, CORONARY, S660 15/18  12/2015    VANITA=Diag, PTCA=LAD     STENT, CORONARY, S660 15/18  06/2015    VANITA=LAD     MEDICATIONS:      - MEDICATION INSTRUCTIONS for Dialysis Patients -   Does not apply See Admin Instructions     abacavir  600 mg Oral QPM     aspirin  81 mg Oral Daily     atorvastatin  40 mg Oral At Bedtime     carvedilol  12.5 mg Oral BID w/meals     clopidogrel  75 mg Oral QPM     dapsone  100 mg Oral At Bedtime     darunavir  800 mg Oral At Bedtime     dolutegravir  50 mg Oral At Bedtime     gabapentin  300 mg Oral BID     hydrALAZINE  25 mg Oral TID     insulin aspart  1-7 Units Subcutaneous TID AC     insulin aspart  1-5 Units Subcutaneous At Bedtime      insulin aspart  15 Units Subcutaneous TID w/meals     insulin glargine  25 Units Subcutaneous BID     ipratropium - albuterol 0.5 mg/2.5 mg/3 mL  3 mL Nebulization 4x daily     irbesartan  300 mg Oral At Bedtime     isosorbide mononitrate  120 mg Oral QPM     magnesium oxide  400 mg Oral At Bedtime     methylPREDNISolone  40 mg Intravenous Q12H     mirtazapine  15 mg Oral At Bedtime     omega-3 acid ethyl esters  2 g Oral BID     pantoprazole  40 mg Oral Daily     ritonavir  100 mg Oral At Bedtime     senna-docusate  1 tablet Oral BID    Or     senna-docusate  2 tablet Oral BID     sucroferric oxyhydroxide  500 mg Oral TID w/meals     vitamin B complex with vitamin C  1 tablet Oral At Bedtime     ALLERGIES:    Allergies as of 2019 - Reviewed 2019   Allergen Reaction Noted     Calcium acetate Other (See Comments) 2017     Diagnostic x-ray materials Other (See Comments) 2012     Lisinopril  2012     Sulfa drugs  2012     FH:    Family History   Problem Relation Age of Onset     Heart Disease Brother 40        CABG     Kidney Disease Sister      Hypertension Sister      Heart Disease Brother         Dilated aorta     SH:    Social History     Socioeconomic History     Marital status:      Spouse name: Not on file     Number of children: Not on file     Years of education: Not on file     Highest education level: Not on file   Social Needs     Financial resource strain: Not on file     Food insecurity - worry: Not on file     Food insecurity - inability: Not on file     Transportation needs - medical: Not on file     Transportation needs - non-medical: Not on file   Occupational History     Not on file   Tobacco Use     Smoking status: Former Smoker     Packs/day: 2.00     Years: 41.00     Pack years: 82.00     Types: Cigarettes     Last attempt to quit: 2003     Years since quittin.0     Smokeless tobacco: Never Used   Substance and Sexual Activity     Alcohol use: No      Alcohol/week: 0.0 oz     Drug use: No     Sexual activity: Not on file   Other Topics Concern     Parent/sibling w/ CABG, MI or angioplasty before 65F 55M? Yes      Service Not Asked     Blood Transfusions Not Asked     Caffeine Concern Yes     Comment: 2 cups daily     Occupational Exposure Not Asked     Hobby Hazards Not Asked     Sleep Concern Yes     Stress Concern Not Asked     Weight Concern Not Asked     Special Diet No     Comment:  more proteins     Back Care Not Asked     Exercise Yes     Comment: Cardiac rehab      Bike Helmet Not Asked     Seat Belt Not Asked     Self-Exams Not Asked   Social History Narrative     Not on file     PHYSICAL EXAM:    /68 (BP Location: Right arm)   Pulse 80   Temp 97.5  F (36.4  C) (Oral)   Resp 16   Wt 85.5 kg (188 lb 6.4 oz)   SpO2 94%   BMI 26.28 kg/m      GENERAL: awake, alert, NAD, sitting up in chair  HEENT:  Normocephalic. No gross abnormalities.  MMM.  Dentition is ok.  Pupils equal.  EOMI.  No scleral icterus.  CV: RRR c 2/6 murmurs, no clicks, gallops, or rubs, no significant ble edema.  RESP: Fairly clear bilaterally with good efforts  GI: Abdomen o/s/nt/nd. No masses, organomegaly  MUSCULOSKELETAL: extremities nl - no gross deformities noted  SKIN: no suspicious lesions or rashes, dry to touch  NEURO:  Strength normal and symmetric.   PSYCH: mood good, affect appropriate  LYMPH: No palpable ant/post cervical and supraclavicular adenopathy  OTHER:  Access - LAF c good thrill/bruit, aneurysmal    LABS:      CBC RESULTS:     Recent Labs   Lab 01/04/19  0600 01/03/19  1243   WBC 2.5* 3.3*   RBC 2.55* 2.30*   HGB 7.4* 6.7*   HCT 24.1* 21.9*   PLT 94* 103*     BMP RESULTS:  Recent Labs   Lab 01/04/19  0600 01/03/19  1243   * 134   POTASSIUM 4.4 3.8   CHLORIDE 96 96   CO2 23 29   BUN 60* 39*   CR 7.76* 5.52*   * 138*   PRABHA 8.8 9.5     INRNo lab results found in last 7 days.     DIAGNOSTICS:  Personally reviewed CXR - clear lungs    A/P:   Donald Raza is a 68 year old male c ESKD who has anemia and improved sob.      1.  ESKD.  Pt dialyses per TRS schedule.  He looks fine today, clinically.  He states his BP was still high post-run on Thursday.  A.  HD tmrw - 4h, -4L UF, 400 BFR via LAF, no heparin, EPO 10k, dox 1.5.    2.  SOB.  The wheezing improved p nebs and today his lungs are pretty clear.  He doesn't feel sob today.  CXR was clear.  BNP was quite high, but clinically he was not wet.  This can be seen in ESKD.  A.  Follow clinically.  B.  Nebs prn.    3.  Anemia.  Pt rec'd a unit of blood yday.    A.  Follow hb, clinically.  B.  Continue EPO 10k c HD.    4.  Secondary HyperPTH.  Pt receives doxercalciferol c HD.  A.  Dox 1.5 qhd.    5.  Elevated troponin.  This is not an ACS.  It is most likely from stunning.  A.  Follow clinically.    6.  FEN.  Pt is on a dialysis diet.    Thank you for this consultation. We will follow c you.  Please call if any questions.    Attestation:   I have reviewed today's relevant vital signs, notes, medications, labs and imaging.    Bipin Chaves MD  [facility] - Nephrology  [phone]

## 2019-01-04 NOTE — PROGRESS NOTES
"[facility] RCAT     Date: 1/3/18    Admission Dx: COPD Exacerbation    Pulmonary History: COPD, Pulm HTN    Home Nebulizer/MDI Use: Albuterol MDI    Home Oxygen: None    Acuity Level (RCAT flow sheet): 3    Aerosol Therapy initiated: Duo QID. Albuterol Q2 prn      Pulmonary Hygiene initiated: Deep Breathe and Cough      Volume Expansion initiated: IS      Current Oxygen Requirements: Room Air  Current SpO2: 94%    Re-evaluation date: 1-6-18    Patient Education: educated patient on bronchodilators      See \"RT Assessments\" flow sheet for patient assessment scoring and Acuity Level Details.               "

## 2019-01-04 NOTE — H&P
History and Physical     Donald Raza MRN# [id number]   YOB: 1950 Age: 68 year old      Date of Admission:  1/3/2019    Primary care provider: Aida Thomas          Assessment and Plan:   68-year-old gentleman with a history of end-stage renal disease on hemodialysis (TuTrS); HIV with CD4 count greater than 400 per the patient; hyperlipidemia; coronary artery disease with prior PCI; prior history of TIA, who gets periodic blood transfusions every 3-4 months, who was admitted to [facility] (12/5-12/13) for sepsis related to community acquired pneumonia. He was doing well from a pulm standpoint as outpt until yesterday when he started to have increasing dry cough, SOB, wheezing and generalized weakness. He was sent from HD to the ED due to concerns of cough and wheezing as well as new onset diarrhea since earlier today.     1. Acute respiratory distress w/o hypoxia:suspect multifactorial 2/2 acute COPD or bronchitis flare complicated by acute on chronic anemia. He is wheezing clinically but the SOB did improve with nebs. CXR is negative for on-going infection, and pt is not hypoxic so doubt PE. Will continue with IV steroids, duonebs Q4, mucinex and tessalon and supportive cares. Does not look like he is decompensating at this time or need bipap.     2. New onset diarrhea: 4 episodes of loose watery stool today. No new meds, did finish a course of Augmentin for PNA around xmas. May be viral but will send off cdiff and enteric panel. Watch I/o and fluid status closely. Push oral fluids for now (genesis getting PRBC) consider gentle fluids if diarrhea continues.    3. Acute on chronic anemia: hgb 6.7 from baseline of 8-9. Normally gets PRBC once every 3-4 months. No obvious bleeding. Will transfuse 1uPRBC today and repeat hgb in am. No additional lasix for now as the pt does not look fluid overloaded.     4. Elevated troponin: likely 2/2 dec renal clearance from CKD, also has atypical  complaints of CP and just had Lexiscan 3 weeks ago w/o signs of ischemia. Will check one more trop to ensure decline but would not do any other additional cardiac work up if trops remains flat.   Cont asa and home bp meds.     5. CAD: stable, as above. Doubt clinical significant of elevated trop. Management as per #4.   Cont home meds.     6. DM: Follow BG. Resume lantus at home dosing. sliding scale insulin PRN.     7. ESRD on HD: s/p full run today per pt. Looks generally euvolemic or perhaps slightly under. Will give 1u PRBC for now. Nephrology consult for HD Tu/Thurs/Sat.    8. HIV: stable per pt, CD4 count >400 per pt. Cont HIV meds.     Admit as inpatient status  Full Code  Disposition: likely home, pt had refused TCU in the past. Ambulate with nursing, consider PT if needs additional rehab.                    Chief Complaint:   SOB         History of Present Illness:   Donald Raza is a 68 year old male who presents with worsening SOB and wheezing and diarrhea over the last 24 hrs. Hx obtained by speaking with ED physician and pt.  Pt has a history of end-stage renal disease on hemodialysis (TuThurSat); HIV with CD4 count greater than 400 per the patient; hyperlipidemia; coronary artery disease with prior PCI; prior history of TIA, who gets periodic blood transfusions every 3-4 months, who was admitted to [facility] (12/5-12/13) for sepsis related to community acquired pneumonia. He was treated with broad abx and ultimately discharged with a course of Augmentin which he finished before XMAS. He was doing well from a pulm standpoint as outpt until yesterday when he started to have increasing dry cough, SOB, wheezing and generalized weakness. He was sent from HD to the ED due to concerns of cough and wheezing as well as new onset diarrhea since earlier today. He denies any fever or chills. Cough is non-productive. No CP or anginal except when he coughs. No new food, travel or sick contact and no overt GI  bleeding.   States that he thought his PNA was all better until yesterday where the sob started again.   On arrival to ED he was not hypoxic but hypertensive. He was fairly wheezy on exam and received nebs and steroids which seemed to improve his sxs. CXR negative for on-going PNA and no significant leukocytosis though is slightly leukopenic. Trop was mildly elevated but did have recent stress test 3 weeks ago that was negative. He will be admitted for further mgmnt of respiratory distress w/o hypoxia, likely multifactorial due to copd/bronchitis flare and acute on chronic anemia.                  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, recurrent     Bradycardia 5/28/2016     CAD S/P percutaneous coronary angioplasty 6/15/2015     Cancer (H)      Chest pain      Dilated aortic root (H) 5/6/2016     ESRD (end stage renal disease) on dialysis (H) 5/6/2016     Generalized muscle weakness 9/8/2018     Hemodialysis-associated hypotension 5/22/2016     Human immunodeficiency virus (HIV) disease (H)      Hypertension 2010     Hypotension, unspecified hypotension type 5/22/2016     Impotence of organic origin      Increased prostate specific antigen (PSA) velocity 08/08/2016    Awaiting bx on blood thinner     Mixed hyperlipidemia      Near syncope 2016    with hemodialysis     NSTEMI (non-ST elevated myocardial infarction) (H) 12/2015, 5/2016     Pulmonary HTN (H)     Mod     TIA (transient ischemic attack) 5/2016     Type 2 diabetes mellitus (H) age 50     Unstable angina (H) 3/4/2016               Past Surgical History:     Past Surgical History:   Procedure Laterality Date     ANGIOGRAM  03-04-16    No culprit lesions, stents widely patent      ANGIOGRAM  05-06-16    Cutting  balloon ptca=Diag     APPENDECTOMY       CHOLECYSTECTOMY, LAPOROSCOPIC       COLOSTOMY  1999    Temporary for diverticulitis     HC LEFT HEART CATHETERIZATION  2018    No significant change  Elevated LVEDp  LVEF 30% No PCI     HEART CATH, ANGIOPLASTY  16    LAD PCI. Stented with a 3.0 x 8 mm Xience Alpine stent.     STENT, CORONARY, S660 15/18  2015    VANITA=Diag, PTCA=LAD     STENT, CORONARY, S660 15/18  2015    VANITA=LAD               Social History:     Social History     Socioeconomic History     Marital status:      Spouse name: Not on file     Number of children: Not on file     Years of education: Not on file     Highest education level: Not on file   Social Needs     Financial resource strain: Not on file     Food insecurity - worry: Not on file     Food insecurity - inability: Not on file     Transportation needs - medical: Not on file     Transportation needs - non-medical: Not on file   Occupational History     Not on file   Tobacco Use     Smoking status: Former Smoker     Packs/day: 2.00     Years: 41.00     Pack years: 82.00     Types: Cigarettes     Last attempt to quit:      Years since quittin.0     Smokeless tobacco: Never Used   Substance and Sexual Activity     Alcohol use: No     Alcohol/week: 0.0 oz     Drug use: No     Sexual activity: Not on file   Other Topics Concern     Parent/sibling w/ CABG, MI or angioplasty before 65F 55M? Yes      Service Not Asked     Blood Transfusions Not Asked     Caffeine Concern Yes     Comment: 2 cups daily     Occupational Exposure Not Asked     Hobby Hazards Not Asked     Sleep Concern Yes     Stress Concern Not Asked     Weight Concern Not Asked     Special Diet No     Comment:  more proteins     Back Care Not Asked     Exercise Yes     Comment: Cardiac rehab      Bike Helmet Not Asked     Seat Belt Not Asked     Self-Exams Not Asked   Social History Narrative     Not on file               Family History:      Family History   Problem Relation Age of Onset     Heart Disease Brother 40        CABG     Kidney Disease Sister      Hypertension Sister      Heart Disease Brother         Dilated aorta              Allergies:      Allergies   Allergen Reactions     Calcium Acetate Other (See Comments)     Other reaction(s): Other, see comments  Pain in chest and back  Pain in chest area (sensitive skin)      Diagnostic X-Ray Materials Other (See Comments)     PN: renal failure     Lisinopril      Sulfa Drugs                Medications:     Prior to Admission medications    Medication Sig Last Dose Taking? Auth Provider   abacavir (ZIAGEN) 300 MG tablet Take 600 mg by mouth every evening  1/2/2019 at pm Yes Abstract, Provider   Acetaminophen (TYLENOL PO) Take 325 mg by mouth every 6 hours as needed for mild pain or fever  1/2/2019 at pm Yes Unknown, Entered By History   albuterol (PROAIR HFA/PROVENTIL HFA/VENTOLIN HFA) 108 (90 BASE) MCG/ACT Inhaler Inhale 2 puffs into the lungs every 6 hours as needed for shortness of breath / dyspnea or wheezing 1/2/2019 at Unknown time Yes Nelson Worthy MD   aspirin EC 81 MG EC tablet Take 1 tablet (81 mg) by mouth daily 1/2/2019 at a.m Yes Clemencia Pal MD   atorvastatin (LIPITOR) 40 MG tablet Take 40 mg by mouth At Bedtime 1/2/2019 at pm  Yes Unknown, Entered By History   B COMPLEX-C-FOLIC ACID PO Take 1 tablet by mouth At Bedtime  1/2/2019 at pm Yes Reported, Patient   carvedilol (COREG) 12.5 MG tablet Take 1 tablet (12.5 mg) by mouth 2 times daily (with meals) 1/2/2019 at pm Yes Clemencia Pal MD   chlorhexidine (CHLORHEXIDINE) 0.12 % solution Rinse and gargle 15 ml by mouth twice a day as directed. 1/2/2019 at pm Yes Abstract, Provider   CLONAZEPAM PO Take 0.5 mg by mouth nightly as needed for anxiety (restless legs) 1/2/2019 at pm  Yes Unknown, Entered By History   CLOPIDOGREL BISULFATE PO Take 75 mg by mouth every evening  1/2/2019 at pm Yes Unknown, Entered By History    dapsone 100 MG tablet Take 100 mg by mouth At Bedtime  1/2/2019 at pm Yes Reported, Patient   Darunavir Ethanolate (PREZISTA PO) Take 800 mg by mouth At Bedtime. 1/2/2019 at pm Yes Reported, Patient   dolutegravir (TIVICAY) 50 MG tablet Take 50 mg by mouth At Bedtime 1/2/2019 at pm Yes Unknown, Entered By History   gabapentin (NEURONTIN) 300 MG capsule Take 300 mg by mouth 2 times daily May take a third dose after dialysis. 1/2/2019 at pm  Yes Reported, Patient   guaiFENesin (MUCINEX) 600 MG 12 hr tablet Take by mouth as needed for congestion unknown Yes Unknown, Entered By History   hydrALAZINE (APRESOLINE) 25 MG tablet Take 1 tablet (25 mg) by mouth 3 times daily 1/2/2019 at pm Yes Lucita Downing MD   imiquimod (ALDARA) 5 % cream Apply topically as needed unknown Yes Reported, Patient   insulin glargine (LANTUS) 100 UNIT/ML injection Inject 25 Units Subcutaneous 2 times daily  1/2/2019 at pm Yes Unknown, Entered By History   Insulin Lispro (HUMALOG PEN SC) Take 15 units TID and an additional 2 units for every 50 mg/dL if BG is over 150 1/2/2019 at pm Yes Unknown, Entered By History   insulin lispro (HUMALOG PEN) 100 UNIT/ML pen Inject Subcutaneous 3 times daily (before meals) Sliding scale: takes 2 units for every 50 mg/dL over 150. 1/2/2019 at pm Yes Unknown, Entered By History   ipratropium - albuterol 0.5 mg/2.5 mg/3 mL (DUONEB) 0.5-2.5 (3) MG/3ML neb solution Take 1 vial (3 mLs) by nebulization every 6 hours as needed for shortness of breath / dyspnea or wheezing 1/2/2019 at Unknown time Yes Catrachito Rosado DO   irbesartan (AVAPRO) 300 MG tablet Take 1 tablet (300 mg) by mouth At Bedtime 1/2/2019 at pm Yes Clemencia Pal MD   iron sucrose (VENOFER) 20 MG/ML injection Inject 50 mg into the vein once a week WIth dialysis Past Week at Unknown time Yes Unknown, Entered By History   Isosorbide Mononitrate  MG TB24 Take 1 tablet (120 mg) by mouth every evening 1/2/2019 at pm Yes Yuki Hinojosa  "E, APRN CNP   ketoconazole (NIZORAL) 2 % cream Apply topically 2 times daily as needed unknown Yes Reported, Patient   MAGNESIUM OXIDE PO Take 400 mg by mouth At Bedtime 1/2/2019 at pm Yes Unknown, Entered By History   mirtazapine (REMERON) 15 MG tablet Take 15 mg by mouth At Bedtime 1/2/2019 at pm Yes Reported, Patient   nitroglycerin (NITROSTAT) 0.4 MG SL tablet One tablet under the tongue every 5 minutes if needed for chest pain. May repeat every 5 minutes for a maximum of 3 doses in 15 minutes\" unknown Yes Lay Paz MD   Nutritional Supplements (NEPRO PO) 1-3 times daily 1/2/2019 at Unknown time Yes Unknown, Entered By History   omega-3 acid ethyl esters (LOVAZA) 1 G capsule Take 2 g by mouth 2 times daily 1/2/2019 at pm Yes Unknown, Entered By History   pantoprazole (PROTONIX) 40 MG EC tablet Take 40 mg by mouth daily 1/2/2019 at a.m Yes Unknown, Entered By History   ritonavir (NORVIR) 100 MG capsule Take 1 capsule by mouth At Bedtime  1/2/2019 at pm Yes Reported, Patient   sucroferric oxyhydroxide (VELPHORO) 500 MG CHEW chewable tablet Take 500 mg by mouth 3 times daily (with meals) 1/2/2019 at pm Yes Unknown, Entered By History   DOXERCALCIFEROL IV Inject 6 mcg into the vein three times a week (with dialysis) 1/27/2018 at On T, TH and Sat with dialysis  Reported, Patient   epoetin brittni (EPOGEN,PROCRIT) 82738 UNIT/ML injection Inject 11,000 Units Subcutaneous three times a week WITH DIALYSIS 1/27/2018 at  as above  Unknown, Entered By History   order for DME Equipment being ordered: Other: 4WW  Treatment Diagnosis: Decreased activity tolerance   Lorna Jhaveri MD              Review of Systems:   A Comprehensive greater than 10 system review of systems was carried out.  Pertinent positives and negatives are noted above.  Otherwise negative for contributory information.          Physical Exam:   Blood pressure 169/76, pulse 80, temperature 95.3  F (35.2  C), temperature source Axillary, resp. " rate 21, weight 83.8 kg (184 lb 12.8 oz), SpO2 96 %.  Exam:  GENERAL:  Able to speak in full sentences, mild respiratory distress  PSYCH: pleasant, oriented, No acute distress.  HEENT:  PERRLA. Normal conjunctiva, normal hearing, nasal mucosa and Oropharynx are normal. No JVP  NECK:  Supple, no neck vein distention, adenopathy or bruits, normal thyroid.  HEART:  Normal S1, S2 with no murmur, no pericardial rub, gallops or S3 or S4.  LUNGS:  Ronchus BS through out with expiratory wheezing.  normal Respiratory effort. No  rales   ABDOMEN:  Soft, no hepatosplenomegaly, normal bowel sounds. Non-tender, non distended.   EXTREMITIES:  No pedal edema, +2 pulses bilateral and equal.  SKIN:  Dry to touch, No rash, wound or ulcerations.  NEUROLOGIC:  CN 2-12 intact, BL 5/5 symmetric upper and lower extremity strength, sensation is intact with no focal deficits.          Data:     Recent Labs   Lab 01/03/19  1243   WBC 3.3*   HGB 6.7*   HCT 21.9*   MCV 95   *     Recent Labs   Lab 01/03/19  1243      POTASSIUM 3.8   CHLORIDE 96   CO2 29   ANIONGAP 9   *   BUN 39*   CR 5.52*   GFRESTIMATED 10*   GFRESTBLACK 11*   PRABHA 9.5     Recent Labs   Lab 01/03/19  1747 01/03/19  1243   TROPI 0.088* 0.106*     No results for input(s): COLOR, APPEARANCE, URINEGLC, URINEBILI, URINEKETONE, SG, UBLD, URINEPH, PROTEIN, UROBILINOGEN, NITRITE, LEUKEST, RBCU, WBCU in the last 168 hours.      Results for orders placed or performed during the hospital encounter of 01/03/19   XR Chest 2 Views    Narrative    CHEST TWO VIEWS January 3, 2019 1:20 PM     HISTORY: Cough, chest pain.    COMPARISON: Chest x-ray from 12/5/2018.      Impression    IMPRESSION: Heart size is normal. Pulmonary vasculature is normal.  Lungs are clear. No pleural fluid. No acute disease.    MD Soraya FIGUEROA PA-C    This patient was seen and discussed with Dr. Fraser who agrees with the current plans as outlined above.

## 2019-01-04 NOTE — PROGRESS NOTES
CTS identifies patient as high risk due to elevated MUSHTAQ score. Currently admitted for COPD exacerbation, this is his 4th admission in 6 months. Per chart review, pt resides at home with his wife, Jennifer, daughter & brother. Baseline mobility is he ambulates with a cane. His daughter is his PCA 9hrs/day M-F. He attends HD T, Th & Sat at [facility]. His AV fistual is on [facility].   He has greater than 10 prescription medications. An MTM referral was made on his last admission (12/14/18) and it was transferred to the [facility] system. He was reactivated with [facility] for RN & PT.      Will follow along with  for DC planning. Will give hand off to clinic RN CTS at time of discharge and assist in making follow up appointments.      Ruby Hinojosa, RN, BSN, CTS  [facility]  [phone]

## 2019-01-04 NOTE — PLAN OF CARE
Admit from ED this shift. BP elevated 180-190 systolic, Scheduled oral given and PRN IV Hydralazine given x1, see MAR, after all scheduled BP meds /76. Dialysis this am, pt very sleepy and lethargic, arouses to vigorous touch and difficult to keep awake pt states this is normal for him after dialysis. 1u PRBC tranfusing now. Pt c/o generalized pain, Tylenol given, see MAR. Assist x1 with cane. Tele SR. Nephrology consult. Enteric precautions in place. No BM for stool sample this shift. Continue POC.

## 2019-01-04 NOTE — PLAN OF CARE
Lethargic at start of shift, more A&O this morning. LS coarse, FRANCO. Tele SR. BP elevated 174/87-PRN IV hydralazine given, other VSS on RA. Fistula to L arm, thrill/bruit present. . Received 1 unit of blood for hgb 6.7, hgb re-check this AM. On IV solumedrol. Isolation-enteric precautions maintained, no BM this shift, need stool and c-diff sample. Up with SBA with cane. Nephrology, social work following. Continue with POC.    /74 (BP Location: Right arm)   Pulse 80   Temp 96.3  F (35.7  C) (Oral)   Resp 20   Wt 85.5 kg (188 lb 6.4 oz)   SpO2 97%   BMI 26.28 kg/m

## 2019-01-04 NOTE — PLAN OF CARE
VSS. Tele SR. Blood sugar 188 and 215. Dialysis diet. Up SBA. Still need stool sample on patient. When MD went into patient said he had chest pain for about 10 minutes earlier, but was no pain at the moment. 12 lead EKG ordered and Troponin. HD TuThSa. Plan: HD tomorrow.       No Change  ARDS (Acute Respiratory Distress Syndrome)  Effective Oxygenation  1/4/2019 1145 - No Change by Sharon Syed RN  1/4/2019 0534 - No Change by Radha Powers RN  1/4/2019 0534 - No Change by Radha Powers RN  1/3/2019 2232 - No Change by Ember Aadm RN  Fluid Volume Excess (Chronic Kidney Disease)  Fluid Balance  1/4/2019 1145 - No Change by Sharon Syed RN  1/4/2019 0534 - No Change by Radha Powers RN  1/4/2019 0534 - No Change by Radha Powers RN  1/3/2019 2232 - No Change by Ember Adam RN  Hematologic Alteration (Chronic Kidney Disease)  Absence of Anemia Signs/Symptoms  1/4/2019 1145 - No Change by Sharon Syed RN  1/4/2019 0534 - No Change by Radha Powers RN  1/4/2019 0534 - No Change by Radha Powers RN  1/3/2019 2232 - No Change by Ember Adam RN  Hypertension Comorbidity  Blood Pressure in Desired Range  1/4/2019 1145 - No Change by Sharon Syed RN  1/4/2019 0534 - No Change by Radha Powers RN

## 2019-01-04 NOTE — PROGRESS NOTES
[facility]    Hospitalist Progress Note  Name: Donald Raza    MRN: [id number]  Provider: Sherice Bernardo MD  Date of Service: 01/04/2019    Assessment & Plan   Summary of Stay: Donald Raza is a 68 year old male with past medical history significant for end-stage renal disease on hemodialysis, HIV with CD4 count greater than 400 per patient, hyperlipidemia, coronary artery disease status post PCI, history of TIA, history of anemia with transfusions every 3-4 months, recent admission to [facility] for sepsis related to community acquired pneumonia presented to the emergency room with increased dry cough shortness of breath wheezing generalized weakness and was admitted on 1/3/2019 for acute respiratory distress with hypoxia and new onset diarrhea    Acute respiratory distress with hypoxia's  --Multifactorial secondary to COPD, bronchitis, acute on chronic anemia  --Symptoms improved with nebulizer treatment  --Chest x-ray negative for pneumonia  --Started on IV steroids clinically improved we will switch to p.o.  --Duo nebs every 4 hours  --Continue Mucinex and Tessalon Perles    New onset diarrhea  --4 episodes of loose stools on the day of admission  --Was recently treated with Augmentin  --On enteric precautions  --No further loose stools in hospital stool studies were not obtained    Acute on chronic anemia  --Hemoglobin 6.7 on admission  --Transfused 1 unit of PRBCs  --Repeat hemoglobin stable    Chest pain with elevated troponin  --Troponins trending down.  Troponin leak suspected due to chronic kidney disease  --Recent Lexiscan 3 weeks ago with no signs of ischemia  --Repeat EKG unchanged  --Continue on aspirin and home meds  --Pain-free at this time  --CAD appears to be stable    Diabetes mellitus  --Sliding scale insulin    End-stage renal disease on hemodialysis  --Nephrology consulted will be following    HIV   --stable per patient      DVT Prophylaxis: Anti-embolisim  stockings (TEDs)  Code Status: Full Code    Disposition: Expected discharge in 1-2 days to home      Interval History   Assumed care reviewed chart.  Patient did complain of an episode of chest pain lasting 10 minutes.  Chest pain on anterior chest pressure heaviness like no radiation no nausea no lightheadedness or dizziness no shortness of breath.  Breathing has improved.  Review of all the other symptoms are negative    -Data reviewed today: I reviewed all new labs and imaging reports over the last 24 hours. I personally reviewed the EKG tracing showing Sinus rhythm with flat T in lead I and inverted T in aVL.  No new when compared to prior EKG.    Physical Exam   Temp: 97.7  F (36.5  C) Temp src: Oral BP: 136/66 Pulse: 80 Heart Rate: 80 Resp: 18 SpO2: 94 % O2 Device: None (Room air)    Vitals:    01/03/19 1144 01/03/19 1639 01/04/19 0259   Weight: 86 kg (189 lb 9.5 oz) 83.8 kg (184 lb 12.8 oz) 85.5 kg (188 lb 6.4 oz)     Vital Signs with Ranges  Temp:  [95.3  F (35.2  C)-99.1  F (37.3  C)] 97.7  F (36.5  C)  Pulse:  [80-89] 80  Heart Rate:  [74-89] 80  Resp:  [18-21] 18  BP: (136-192)/(60-92) 136/66  SpO2:  [90 %-99 %] 94 %  I/O last 3 completed shifts:  In: 300   Out: 100 [Urine:100]      GEN:  Alert, oriented x 3, appears comfortable, NAD.  HEENT:  Normocephalic/atraumatic, no scleral icterus, no nasal discharge, mouth moist.  CV:  Regular rate and rhythm, no murmur or JVD.  S1 + S2 noted, no S3 or S4.  LUNGS: Few bibasilar crackles otherwise clear to auscultation bilaterally without rales/rhonchi/wheezing/retractions.  Symmetric chest rise on inhalation noted.  ABD:  Active bowel sounds, soft, non-tender/non-distended.  No rebound/guarding/rigidity.  EXT:  +edema.  No cyanosis.  No joint synovitis noted.  SKIN:  Dry to touch, no exanthems noted in the visualized areas.    Medications       abacavir  600 mg Oral QPM     aspirin  81 mg Oral Daily     atorvastatin  40 mg Oral At Bedtime     carvedilol  12.5 mg  Oral BID w/meals     clopidogrel  75 mg Oral QPM     dapsone  100 mg Oral At Bedtime     darunavir  800 mg Oral At Bedtime     dolutegravir  50 mg Oral At Bedtime     gabapentin  300 mg Oral BID     hydrALAZINE  25 mg Oral TID     insulin aspart  1-7 Units Subcutaneous TID AC     insulin aspart  1-5 Units Subcutaneous At Bedtime     insulin aspart  15 Units Subcutaneous TID w/meals     insulin glargine  25 Units Subcutaneous BID     ipratropium - albuterol 0.5 mg/2.5 mg/3 mL  3 mL Nebulization 4x daily     irbesartan  300 mg Oral At Bedtime     isosorbide mononitrate  120 mg Oral QPM     magnesium oxide  400 mg Oral At Bedtime     methylPREDNISolone  40 mg Intravenous Q12H     mirtazapine  15 mg Oral At Bedtime     omega-3 acid ethyl esters  2 g Oral BID     pantoprazole  40 mg Oral Daily     ritonavir  100 mg Oral At Bedtime     senna-docusate  1 tablet Oral BID    Or     senna-docusate  2 tablet Oral BID     sucroferric oxyhydroxide  500 mg Oral TID w/meals     vitamin B complex with vitamin C  1 tablet Oral At Bedtime     Data     Recent Labs   Lab 01/04/19  0600 01/03/19  1243   WBC 2.5* 3.3*   HGB 7.4* 6.7*   HCT 24.1* 21.9*   MCV 95 95   PLT 94* 103*     Recent Labs   Lab 01/04/19  0600 01/03/19  1243   * 134   POTASSIUM 4.4 3.8   CHLORIDE 96 96   CO2 23 29   ANIONGAP 13 9   * 138*   BUN 60* 39*   CR 7.76* 5.52*   GFRESTIMATED 6* 10*   GFRESTBLACK 7* 11*   PRABHA 8.8 9.5     Recent Labs   Lab 01/03/19  1359 01/03/19  1242   CULT No growth after 9 hours No growth after 14 hours     Recent Labs   Lab 01/03/19  1243   AST 31   ALT 16   ALKPHOS 107   BILITOTAL 0.7     No results for input(s): INR in the last 168 hours.  Recent Labs   Lab 01/03/19  1235   LACT 1.4     Recent Labs   Lab 01/04/19  0600 01/03/19  1747 01/03/19  1243   TROPI 0.052* 0.088* 0.106*     No results for input(s): COLOR, APPEARANCE, URINEGLC, URINEBILI, URINEKETONE, SG, UBLD, URINEPH, PROTEIN, UROBILINOGEN, NITRITE, LEUKEST, RBCU,  WBCU in the last 168 hours.    Recent Results (from the past 24 hour(s))   XR Chest 2 Views    Narrative    CHEST TWO VIEWS January 3, 2019 1:20 PM     HISTORY: Cough, chest pain.    COMPARISON: Chest x-ray from 12/5/2018.      Impression    IMPRESSION: Heart size is normal. Pulmonary vasculature is normal.  Lungs are clear. No pleural fluid. No acute disease.    ASHOK ECHEVERRIA MD

## 2019-01-04 NOTE — PROGRESS NOTES
Discharge Planner   Discharge Plans in progress: Yes  Barriers to discharge plan: IV steroids  Follow up plan: Pt declined having CM schedule f/u appt.        Entered by: Dinorah Hinojosa 01/04/2019 2:42 PM

## 2019-01-05 LAB
ANION GAP SERPL CALCULATED.3IONS-SCNC: 14 MMOL/L (ref 3–14)
BASOPHILS # BLD AUTO: 0 10E9/L (ref 0–0.2)
BASOPHILS NFR BLD AUTO: 0 %
BUN SERPL-MCNC: 103 MG/DL (ref 7–30)
CALCIUM SERPL-MCNC: 8.7 MG/DL (ref 8.5–10.1)
CHLORIDE SERPL-SCNC: 91 MMOL/L (ref 94–109)
CO2 SERPL-SCNC: 22 MMOL/L (ref 20–32)
CREAT SERPL-MCNC: 9.78 MG/DL (ref 0.66–1.25)
DIFFERENTIAL METHOD BLD: ABNORMAL
EOSINOPHIL # BLD AUTO: 0 10E9/L (ref 0–0.7)
EOSINOPHIL NFR BLD AUTO: 0 %
ERYTHROCYTE [DISTWIDTH] IN BLOOD BY AUTOMATED COUNT: 17.6 % (ref 10–15)
GFR SERPL CREATININE-BSD FRML MDRD: 5 ML/MIN/{1.73_M2}
GLUCOSE BLDC GLUCOMTR-MCNC: 185 MG/DL (ref 70–99)
GLUCOSE BLDC GLUCOMTR-MCNC: 306 MG/DL (ref 70–99)
GLUCOSE BLDC GLUCOMTR-MCNC: 311 MG/DL (ref 70–99)
GLUCOSE BLDC GLUCOMTR-MCNC: 313 MG/DL (ref 70–99)
GLUCOSE BLDC GLUCOMTR-MCNC: 345 MG/DL (ref 70–99)
GLUCOSE SERPL-MCNC: 313 MG/DL (ref 70–99)
HCT VFR BLD AUTO: 24.2 % (ref 40–53)
HGB BLD-MCNC: 7.6 G/DL (ref 13.3–17.7)
IMM GRANULOCYTES # BLD: 0 10E9/L (ref 0–0.4)
IMM GRANULOCYTES NFR BLD: 0.7 %
LYMPHOCYTES # BLD AUTO: 0.4 10E9/L (ref 0.8–5.3)
LYMPHOCYTES NFR BLD AUTO: 9.4 %
MCH RBC QN AUTO: 29.7 PG (ref 26.5–33)
MCHC RBC AUTO-ENTMCNC: 31.4 G/DL (ref 31.5–36.5)
MCV RBC AUTO: 95 FL (ref 78–100)
MONOCYTES # BLD AUTO: 0.2 10E9/L (ref 0–1.3)
MONOCYTES NFR BLD AUTO: 3.7 %
NEUTROPHILS # BLD AUTO: 3.8 10E9/L (ref 1.6–8.3)
NEUTROPHILS NFR BLD AUTO: 86.2 %
NRBC # BLD AUTO: 0 10*3/UL
NRBC BLD AUTO-RTO: 0 /100
PLATELET # BLD AUTO: 101 10E9/L (ref 150–450)
POTASSIUM SERPL-SCNC: 4.8 MMOL/L (ref 3.4–5.3)
RBC # BLD AUTO: 2.56 10E12/L (ref 4.4–5.9)
SODIUM SERPL-SCNC: 127 MMOL/L (ref 133–144)
WBC # BLD AUTO: 4.4 10E9/L (ref 4–11)

## 2019-01-05 PROCEDURE — 36415 COLL VENOUS BLD VENIPUNCTURE: CPT | Performed by: INTERNAL MEDICINE

## 2019-01-05 PROCEDURE — 94640 AIRWAY INHALATION TREATMENT: CPT

## 2019-01-05 PROCEDURE — 25000131 ZZH RX MED GY IP 250 OP 636 PS 637: Mod: GY | Performed by: INTERNAL MEDICINE

## 2019-01-05 PROCEDURE — 85025 COMPLETE CBC W/AUTO DIFF WBC: CPT | Performed by: INTERNAL MEDICINE

## 2019-01-05 PROCEDURE — 25000125 ZZHC RX 250: Performed by: INTERNAL MEDICINE

## 2019-01-05 PROCEDURE — 63400005 ZZH RX 634: Performed by: INTERNAL MEDICINE

## 2019-01-05 PROCEDURE — 25000128 H RX IP 250 OP 636: Performed by: INTERNAL MEDICINE

## 2019-01-05 PROCEDURE — 40000275 ZZH STATISTIC RCP TIME EA 10 MIN

## 2019-01-05 PROCEDURE — 94640 AIRWAY INHALATION TREATMENT: CPT | Mod: 76

## 2019-01-05 PROCEDURE — 00000146 ZZHCL STATISTIC GLUCOSE BY METER IP

## 2019-01-05 PROCEDURE — 12000000 ZZH R&B MED SURG/OB

## 2019-01-05 PROCEDURE — 99232 SBSQ HOSP IP/OBS MODERATE 35: CPT | Performed by: INTERNAL MEDICINE

## 2019-01-05 PROCEDURE — 80048 BASIC METABOLIC PNL TOTAL CA: CPT | Performed by: INTERNAL MEDICINE

## 2019-01-05 PROCEDURE — 5A1D70Z PERFORMANCE OF URINARY FILTRATION, INTERMITTENT, LESS THAN 6 HOURS PER DAY: ICD-10-PCS | Performed by: INTERNAL MEDICINE

## 2019-01-05 PROCEDURE — 90937 HEMODIALYSIS REPEATED EVAL: CPT

## 2019-01-05 PROCEDURE — 25000132 ZZH RX MED GY IP 250 OP 250 PS 637: Mod: GY | Performed by: PHYSICIAN ASSISTANT

## 2019-01-05 PROCEDURE — A9270 NON-COVERED ITEM OR SERVICE: HCPCS | Mod: GY | Performed by: PHYSICIAN ASSISTANT

## 2019-01-05 RX ORDER — ALBUMIN (HUMAN) 12.5 G/50ML
50 SOLUTION INTRAVENOUS
Status: DISCONTINUED | OUTPATIENT
Start: 2019-01-05 | End: 2019-01-06 | Stop reason: HOSPADM

## 2019-01-05 RX ORDER — ALBUMIN, HUMAN INJ 5% 5 %
250 SOLUTION INTRAVENOUS
Status: DISCONTINUED | OUTPATIENT
Start: 2019-01-05 | End: 2019-01-06 | Stop reason: HOSPADM

## 2019-01-05 RX ORDER — DOXERCALCIFEROL 4 UG/2ML
1.5 INJECTION INTRAVENOUS
Status: COMPLETED | OUTPATIENT
Start: 2019-01-05 | End: 2019-01-05

## 2019-01-05 RX ADMIN — ATORVASTATIN CALCIUM 40 MG: 40 TABLET, FILM COATED ORAL at 22:33

## 2019-01-05 RX ADMIN — MAGNESIUM OXIDE TAB 400 MG (241.3 MG ELEMENTAL MG) 400 MG: 400 (241.3 MG) TAB at 22:32

## 2019-01-05 RX ADMIN — INSULIN GLARGINE 25 UNITS: 100 INJECTION, SOLUTION SUBCUTANEOUS at 22:32

## 2019-01-05 RX ADMIN — DOXERCALCIFEROL 1.5 MCG: 4 INJECTION, SOLUTION INTRAVENOUS at 14:56

## 2019-01-05 RX ADMIN — IPRATROPIUM BROMIDE AND ALBUTEROL SULFATE 3 ML: .5; 3 SOLUTION RESPIRATORY (INHALATION) at 20:29

## 2019-01-05 RX ADMIN — INSULIN ASPART 1 UNITS: 100 INJECTION, SOLUTION INTRAVENOUS; SUBCUTANEOUS at 19:39

## 2019-01-05 RX ADMIN — IPRATROPIUM BROMIDE AND ALBUTEROL SULFATE 3 ML: .5; 3 SOLUTION RESPIRATORY (INHALATION) at 16:23

## 2019-01-05 RX ADMIN — SENNOSIDES AND DOCUSATE SODIUM 1 TABLET: 8.6; 5 TABLET ORAL at 22:33

## 2019-01-05 RX ADMIN — HYDRALAZINE HYDROCHLORIDE 25 MG: 25 TABLET ORAL at 22:32

## 2019-01-05 RX ADMIN — INSULIN ASPART 5 UNITS: 100 INJECTION, SOLUTION INTRAVENOUS; SUBCUTANEOUS at 11:42

## 2019-01-05 RX ADMIN — CARVEDILOL 12.5 MG: 12.5 TABLET, FILM COATED ORAL at 19:33

## 2019-01-05 RX ADMIN — Medication 1 TABLET: at 22:33

## 2019-01-05 RX ADMIN — INSULIN GLARGINE 25 UNITS: 100 INJECTION, SOLUTION SUBCUTANEOUS at 08:43

## 2019-01-05 RX ADMIN — IPRATROPIUM BROMIDE AND ALBUTEROL SULFATE 3 ML: .5; 3 SOLUTION RESPIRATORY (INHALATION) at 07:27

## 2019-01-05 RX ADMIN — IPRATROPIUM BROMIDE AND ALBUTEROL SULFATE 3 ML: .5; 3 SOLUTION RESPIRATORY (INHALATION) at 11:36

## 2019-01-05 RX ADMIN — DAPSONE 100 MG: 100 TABLET ORAL at 22:33

## 2019-01-05 RX ADMIN — SODIUM CHLORIDE 300 ML: 9 INJECTION, SOLUTION INTRAVENOUS at 18:03

## 2019-01-05 RX ADMIN — SODIUM CHLORIDE 250 ML: 9 INJECTION, SOLUTION INTRAVENOUS at 14:26

## 2019-01-05 RX ADMIN — ABACAVIR 600 MG: 300 TABLET, FILM COATED ORAL at 19:33

## 2019-01-05 RX ADMIN — CLOPIDOGREL BISULFATE 75 MG: 75 TABLET, FILM COATED ORAL at 19:33

## 2019-01-05 RX ADMIN — RITONAVIR 100 MG: 100 TABLET, FILM COATED ORAL at 22:32

## 2019-01-05 RX ADMIN — ISOSORBIDE MONONITRATE 120 MG: 60 TABLET, EXTENDED RELEASE ORAL at 19:33

## 2019-01-05 RX ADMIN — INSULIN ASPART 4 UNITS: 100 INJECTION, SOLUTION INTRAVENOUS; SUBCUTANEOUS at 08:24

## 2019-01-05 RX ADMIN — MIRTAZAPINE 15 MG: 15 TABLET, FILM COATED ORAL at 22:32

## 2019-01-05 RX ADMIN — DOLUTEGRAVIR SODIUM 50 MG: 50 TABLET, FILM COATED ORAL at 22:32

## 2019-01-05 RX ADMIN — GABAPENTIN 300 MG: 300 CAPSULE ORAL at 22:32

## 2019-01-05 RX ADMIN — OMEGA-3-ACID ETHYL ESTERS 2 G: 1 CAPSULE, LIQUID FILLED ORAL at 22:32

## 2019-01-05 RX ADMIN — HYDRALAZINE HYDROCHLORIDE 25 MG: 25 TABLET ORAL at 19:34

## 2019-01-05 RX ADMIN — DARUNAVIR 800 MG: 800 TABLET, FILM COATED ORAL at 22:32

## 2019-01-05 RX ADMIN — IRBESARTAN 300 MG: 300 TABLET ORAL at 22:34

## 2019-01-05 RX ADMIN — ERYTHROPOIETIN 10000 UNITS: 10000 INJECTION, SOLUTION INTRAVENOUS; SUBCUTANEOUS at 14:55

## 2019-01-05 ASSESSMENT — ACTIVITIES OF DAILY LIVING (ADL)
ADLS_ACUITY_SCORE: 14
ADLS_ACUITY_SCORE: 16
ADLS_ACUITY_SCORE: 16
ADLS_ACUITY_SCORE: 15

## 2019-01-05 NOTE — PLAN OF CARE
Elevated blood pressure otherwise VSS. Tele - SR. Blood sugar 311 and 345. HD today (normally runs TuThSa). Tolerating dialysis diet. Chronic anemia - current hemoglobin 7.6. Switched to PO prednisone. Up SBA with cane. Plan: possibly discharge home tomorrow.

## 2019-01-05 NOTE — PROGRESS NOTES
Potassium   Date Value Ref Range Status   01/05/2019 4.8 3.4 - 5.3 mmol/L Final     Lab Results   Component Value Date    HGB 7.6 01/05/2019     Weight: 85.5 kg (188 lb 7.9 oz)  POST WT 82.7  kg  DIALYSIS PROCEDURE NOTE  Hepatitis status of previous patient on machine log was checked and verified ok to use with this patients hepatitis status.  Patient dialyzed for 4 hrs. on a 2 K bath with a net fluid removal of  2.8L.  A BFR of 400 ml/min was obtained via a LAVF using 15 gauge needles.    The patient was seen by Dr. Bradford during the treatment.  Total heparin received during the treatment: 0 units. Sites held x 15 min then  pressure drsgs applied.    Meds  Given: Epogen and Hectorol Complications: With 1 hr remaining on dialysis pt. c/o midsternal chest pain and pressure accompanied by mild SOB. Rated pain as 4/10.  Dr. Natarajan's notified and instructed to turn UF off at this time.   Pt reported a relief in pain after UF was turned off and put on 3 L NC.   Procedure and ESRD teaching done.   See flowsheet in EPIC for further details and post assessment.  Machine water alarm in place and functioning. Transducer pods intact and checked every 15min.  Pt dialyzed at bedside  Vascular Access: Aseptic prep done for both on/off.  Report received from: Sharon Syed RN  Report given to: Noemi Newby RN   HEPATITIS B SURFACE ANTIGEN Negatove DATE 01/20/2018    HEPATITIS B SURFACE ANTIBODY Immune DATE 01/20/2018  Chlorine/Chloramine water system checked every 4 hours.  Outpatient Dialysis at [facility] FABIOLA T, Th, Sa

## 2019-01-05 NOTE — PROGRESS NOTES
[facility]    Hospitalist Progress Note  Name: Donald Raza    MRN: [id number]  Provider: Sherice Bernardo MD  Date of Service: 01/05/2019    Assessment & Plan   Summary of Stay: Donald Raza is a 68 year old male with past medical history significant for end-stage renal disease on hemodialysis, HIV with CD4 count greater than 400 per patient, hyperlipidemia, coronary artery disease status post PCI, history of TIA, history of anemia with transfusions every 3-4 months, recent admission to [facility] for sepsis related to community acquired pneumonia presented to the emergency room with increased dry cough shortness of breath wheezing generalized weakness and was admitted on 1/3/2019 for acute respiratory distress with hypoxia and new onset diarrhea    Acute respiratory distress with hypoxia's  --Multifactorial secondary to COPD, bronchitis, acute on chronic anemia  --Symptoms improved with nebulizer treatment  --Chest x-ray negative for pneumonia  --Started on IV steroids clinically improved we will switch to p.o.  --Duo nebs every 4 hours  --Continue Mucinex and Tessalon Perles    New onset diarrhea  --4 episodes of loose stools on the day of admission  --Was recently treated with Augmentin  --On enteric precautions  --No further loose stools in hospital stool studies were not obtained    Acute on chronic anemia  --Hemoglobin 6.7 on admission  --Transfused 1 unit of PRBCs  --Repeat hemoglobin stable    Chest pain with elevated troponin  --Troponins trending down.  Troponin leak suspected due to chronic kidney disease  --Recent Lexiscan 3 weeks ago with no signs of ischemia  --Repeat EKG unchanged  --Continue on aspirin and home meds  --Pain-free at this time  --CAD appears to be stable    Diabetes mellitus  --Sliding scale insulin    End-stage renal disease on hemodialysis  --Nephrology consulted will be following  -- dialysis today    HIV   --stable per patient      DVT Prophylaxis:  Anti-embolisim stockings (TEDs)  Code Status: Full Code    Disposition: Expected discharge in 1days to home      Interval History   Reviewed chart.    Breathing has improved.  Continues to have occasional congestion and tightness in chest with wheezing. review of all the other symptoms are negative    -Data reviewed today: I reviewed all new labs and imaging reports over the last 24 hours. I personally reviewed the EKG tracing showing Sinus rhythm with flat T in lead I and inverted T in aVL.  No new when compared to prior EKG.    Physical Exam   Temp: 96.3  F (35.7  C) Temp src: Oral BP: 174/83   Heart Rate: 78 Resp: 16 SpO2: 95 % O2 Device: None (Room air)    Vitals:    01/03/19 1144 01/03/19 1639 01/04/19 0259   Weight: 86 kg (189 lb 9.5 oz) 83.8 kg (184 lb 12.8 oz) 85.5 kg (188 lb 6.4 oz)     Vital Signs with Ranges  Temp:  [96.3  F (35.7  C)-97.5  F (36.4  C)] 96.3  F (35.7  C)  Heart Rate:  [77-95] 78  Resp:  [16-20] 16  BP: (148-198)/(66-93) 174/83  SpO2:  [93 %-96 %] 95 %  I/O last 3 completed shifts:  In: 240 [P.O.:240]  Out: -       GEN:  Alert, oriented x 3, appears comfortable, NAD.  HEENT:  Normocephalic/atraumatic, no scleral icterus, no nasal discharge, mouth moist.  CV:  Regular rate and rhythm, no murmur or JVD.  S1 + S2 noted, no S3 or S4.  LUNGS: Few bibasilar crackles and occasional wheezing otherwise clear to auscultation bilaterally.  Symmetric chest rise on inhalation noted.  ABD:  Active bowel sounds, soft, non-tender/non-distended.  No rebound/guarding/rigidity.  EXT:  +edema.  No cyanosis.  No joint synovitis noted.  SKIN:  Dry to touch, no exanthems noted in the visualized areas.    Medications       - MEDICATION INSTRUCTIONS for Dialysis Patients -   Does not apply See Admin Instructions     abacavir  600 mg Oral QPM     aspirin  81 mg Oral Daily     atorvastatin  40 mg Oral At Bedtime     carvedilol  12.5 mg Oral BID w/meals     clopidogrel  75 mg Oral QPM     dapsone  100 mg Oral At  Bedtime     darunavir  800 mg Oral At Bedtime     dolutegravir  50 mg Oral At Bedtime     gabapentin  300 mg Oral BID     hydrALAZINE  25 mg Oral TID     insulin aspart  1-7 Units Subcutaneous TID AC     insulin aspart  1-5 Units Subcutaneous At Bedtime     insulin aspart  15 Units Subcutaneous TID w/meals     insulin glargine  25 Units Subcutaneous BID     ipratropium - albuterol 0.5 mg/2.5 mg/3 mL  3 mL Nebulization 4x daily     irbesartan  300 mg Oral At Bedtime     isosorbide mononitrate  120 mg Oral QPM     magnesium oxide  400 mg Oral At Bedtime     methylPREDNISolone  40 mg Intravenous Q12H     mirtazapine  15 mg Oral At Bedtime     omega-3 acid ethyl esters  2 g Oral BID     pantoprazole  40 mg Oral Daily     ritonavir  100 mg Oral At Bedtime     senna-docusate  1 tablet Oral BID    Or     senna-docusate  2 tablet Oral BID     sucroferric oxyhydroxide  500 mg Oral TID w/meals     vitamin B complex with vitamin C  1 tablet Oral At Bedtime     Data     Recent Labs   Lab 01/05/19  0829 01/04/19  0600 01/03/19  1243   WBC 4.4 2.5* 3.3*   HGB 7.6* 7.4* 6.7*   HCT 24.2* 24.1* 21.9*   MCV 95 95 95   * 94* 103*     Recent Labs   Lab 01/04/19  0600 01/03/19  1243   * 134   POTASSIUM 4.4 3.8   CHLORIDE 96 96   CO2 23 29   ANIONGAP 13 9   * 138*   BUN 60* 39*   CR 7.76* 5.52*   GFRESTIMATED 6* 10*   GFRESTBLACK 7* 11*   PRABHA 8.8 9.5     Recent Labs   Lab 01/03/19  1359 01/03/19  1242   CULT No growth after 2 days No growth after 2 days     Recent Labs   Lab 01/03/19  1243   AST 31   ALT 16   ALKPHOS 107   BILITOTAL 0.7     No results for input(s): INR in the last 168 hours.  Recent Labs   Lab 01/03/19  1235   LACT 1.4     Recent Labs   Lab 01/04/19  1200 01/04/19  0600 01/03/19  1747   TROPI 0.049* 0.052* 0.088*     No results for input(s): COLOR, APPEARANCE, URINEGLC, URINEBILI, URINEKETONE, SG, UBLD, URINEPH, PROTEIN, UROBILINOGEN, NITRITE, LEUKEST, RBCU, WBCU in the last 168 hours.    No results  found for this or any previous visit (from the past 24 hour(s)).

## 2019-01-05 NOTE — PLAN OF CARE
PRIMARY DIAGNOSIS: COPD   GOALS TO BE MET BEFORE DISCHARGE:  ADLs back to baseline: No    Activity and level of assistance: Up with x1 standby assistance.    Pain status: Improved-controlled with oral pain medications per patient comfort level.    Return to near baseline physical activity: No     Discharge Planner Nurse   Safe discharge environment identified: No  Barriers to discharge: Yes  Patient alert & orient x3-4. (+) CSM BUE/BLE. No respiratory or cardiac distress voiced or noted. Patient rested in bed this shift. Telemetry shows SR w/peaked T-waves.

## 2019-01-05 NOTE — PROGRESS NOTES
Assessment and Plan:   ESRD: seen on dialysis. He is running for 4 h with  BFR. Set for 4 L UF. 35 HCO3 and 3K. Getting EPO high dose and hectorol  1.5 mcg on the run.   Taking velphoro and Mg Ox.             Interval History:   HT: on coreg,  Hydralazine,   Avapro, imdur.     HIV: on meds.   Anemia: high dose EPO.               Review of Systems:   No sx on dialysis.          Medications:       - MEDICATION INSTRUCTIONS for Dialysis Patients -   Does not apply See Admin Instructions     sodium chloride 0.9%  300 mL Hemodialysis Machine Once     abacavir  600 mg Oral QPM     aspirin  81 mg Oral Daily     atorvastatin  40 mg Oral At Bedtime     carvedilol  12.5 mg Oral BID w/meals     clopidogrel  75 mg Oral QPM     dapsone  100 mg Oral At Bedtime     darunavir  800 mg Oral At Bedtime     dolutegravir  50 mg Oral At Bedtime     gabapentin  300 mg Oral BID     hydrALAZINE  25 mg Oral TID     insulin aspart  1-7 Units Subcutaneous TID AC     insulin aspart  1-5 Units Subcutaneous At Bedtime     insulin aspart  15 Units Subcutaneous TID w/meals     insulin glargine  25 Units Subcutaneous BID     ipratropium - albuterol 0.5 mg/2.5 mg/3 mL  3 mL Nebulization 4x daily     irbesartan  300 mg Oral At Bedtime     isosorbide mononitrate  120 mg Oral QPM     magnesium oxide  400 mg Oral At Bedtime     mirtazapine  15 mg Oral At Bedtime     - MEDICATION INSTRUCTIONS -   Does not apply Once     omega-3 acid ethyl esters  2 g Oral BID     pantoprazole  40 mg Oral Daily     predniSONE  60 mg Oral Daily     ritonavir  100 mg Oral At Bedtime     senna-docusate  1 tablet Oral BID    Or     senna-docusate  2 tablet Oral BID     sucroferric oxyhydroxide  500 mg Oral TID w/meals     vitamin B complex with vitamin C  1 tablet Oral At Bedtime       - MEDICATION INSTRUCTIONS -       Current active medications and PTA medications reviewed, see medication list for details.            Physical Exam:   Vitals were  reviewed  Patient Vitals for the past 24 hrs:   BP Temp Temp src Heart Rate Resp SpO2 Weight   19 1552 137/70 -- -- -- -- 98 % --   19 1526 139/70 -- -- -- -- 97 % --   19 1511 141/69 -- -- -- -- 97 % --   19 1456 144/78 -- -- -- -- 98 % --   19 1438 152/78 -- -- -- -- 98 % --   19 1422 171/80 -- -- -- -- 96 % --   19 1357 173/82 -- -- -- -- 95 % --   19 1340 181/77 96  F (35.6  C) Oral -- -- 95 % --   19 1330 -- -- -- -- -- -- 85.5 kg (188 lb 7.9 oz)   19 1136 -- -- -- 69 20 94 % --   19 0802 174/83 96.3  F (35.7  C) -- 78 16 95 % --   19 0727 -- -- -- 85 16 96 % --   19 2335 148/69 97.1  F (36.2  C) Oral 80 18 93 % --   19 2215 150/66 -- -- -- -- -- --   19 2109 -- -- -- -- -- 96 % --   19 1953 171/75 -- -- 84 20 -- --   19 1802 (!) 198/93 -- -- 95 -- -- --   19 1601 -- -- -- 77 18 96 % --       Temp:  [96  F (35.6  C)-97.1  F (36.2  C)] 96  F (35.6  C)  Heart Rate:  [69-95] 69  Resp:  [16-20] 20  BP: (137-198)/(66-93) 137/70  SpO2:  [93 %-98 %] 98 %    Temperatures:  Current - Temp: 96  F (35.6  C); Max - Temp  Av.5  F (35.8  C)  Min: 96  F (35.6  C)  Max: 97.1  F (36.2  C)  Respiration range: Resp  Av  Min: 16  Max: 20  Pulse range: No Data Recorded  Blood pressure range: Systolic (24hrs), Avg:160 , Min:137 , Max:198   ; Diastolic (24hrs), Av, Min:66, Max:93    Pulse oximetry range: SpO2  Av %  Min: 93 %  Max: 98 %    I/O last 3 completed shifts:  In: -   Out: 100 [Urine:100]      Intake/Output Summary (Last 24 hours) at 2019 1558  Last data filed at 2019 0806  Gross per 24 hour   Intake --   Output 100 ml   Net -100 ml       Alert and responsive  LAF with needles in place  Lungs with clear ant BS  Cor RRR  S1 S2 no M  LE no edema     Wt Readings from Last 4 Encounters:   19 85.5 kg (188 lb 7.9 oz)   18 90.5 kg (199 lb 8.3 oz)   18 86.9 kg (191 lb 8 oz)    07/14/18 86.6 kg (190 lb 14.7 oz)          Data:          Lab Results   Component Value Date     01/05/2019     01/04/2019     01/03/2019    Lab Results   Component Value Date    CHLORIDE 91 01/05/2019    CHLORIDE 96 01/04/2019    CHLORIDE 96 01/03/2019      Lab Results   Component Value Date     01/05/2019    BUN 60 01/04/2019    BUN 39 01/03/2019      Lab Results   Component Value Date    POTASSIUM 4.8 01/05/2019    POTASSIUM 4.4 01/04/2019    POTASSIUM 3.8 01/03/2019    Lab Results   Component Value Date    CO2 22 01/05/2019    CO2 23 01/04/2019    CO2 29 01/03/2019    Lab Results   Component Value Date    CR 9.78 01/05/2019    CR 7.76 01/04/2019    CR 5.52 01/03/2019        Recent Labs   Lab Test 01/05/19  0829 01/04/19  0600 01/03/19  1243   WBC 4.4 2.5* 3.3*   HGB 7.6* 7.4* 6.7*   HCT 24.2* 24.1* 21.9*   MCV 95 95 95   * 94* 103*     Recent Labs   Lab Test 01/03/19  1243 12/05/18  2140 09/06/18  0622  07/06/14  2255   AST 31 15 16   < > 18   ALT 16 13 14   < > 17   ALKPHOS 107 139 124   < > 132   BILITOTAL 0.7 0.6 0.4   < > 0.5   BILICONJ  --   --   --   --  0.0    < > = values in this interval not displayed.       Recent Labs   Lab Test 09/01/18  1146 12/27/17  0720 10/10/17  2225   MAG 2.1 2.2 1.9     Recent Labs   Lab Test 12/11/18  0640 12/08/18  0702 09/08/18  0557   PHOS 6.1* 6.7* 6.2*     Recent Labs   Lab Test 01/05/19  0829 01/04/19  0600 01/03/19  1243   PRABHA 8.7 8.8 9.5       Lab Results   Component Value Date    PRABHA 8.7 01/05/2019     Lab Results   Component Value Date    WBC 4.4 01/05/2019    HGB 7.6 (L) 01/05/2019    HCT 24.2 (L) 01/05/2019    MCV 95 01/05/2019     (L) 01/05/2019     Lab Results   Component Value Date     (L) 01/05/2019    POTASSIUM 4.8 01/05/2019    CHLORIDE 91 (L) 01/05/2019    CO2 22 01/05/2019     (H) 01/05/2019     Lab Results   Component Value Date     (H) 01/05/2019    CR 9.78 (H) 01/05/2019     Lab Results    Component Value Date    MAG 2.1 09/01/2018     Lab Results   Component Value Date    PHOS 6.1 (H) 12/11/2018       Creatinine   Date Value Ref Range Status   01/05/2019 9.78 (H) 0.66 - 1.25 mg/dL Final   01/04/2019 7.76 (H) 0.66 - 1.25 mg/dL Final   01/03/2019 5.52 (H) 0.66 - 1.25 mg/dL Final   12/13/2018 8.89 (H) 0.66 - 1.25 mg/dL Final   12/11/2018 9.63 (H) 0.66 - 1.25 mg/dL Final   12/10/2018 8.06 (H) 0.66 - 1.25 mg/dL Final       Attestation:  I have reviewed today's vital signs, notes, medications, labs and imaging.  \seen on dialysis.      Eric Bradford MD

## 2019-01-05 NOTE — PLAN OF CARE
VSS. Denies pain or any other issue. Plan is for HD tomorrow at 1200. Will likely also receive 1 unit PRBCs pending AM Hgb. SOB improved, non-productive cough noted.

## 2019-01-06 VITALS
HEART RATE: 82 BPM | OXYGEN SATURATION: 96 % | TEMPERATURE: 97.6 F | BODY MASS INDEX: 26.29 KG/M2 | RESPIRATION RATE: 20 BRPM | SYSTOLIC BLOOD PRESSURE: 132 MMHG | DIASTOLIC BLOOD PRESSURE: 68 MMHG | WEIGHT: 188.49 LBS

## 2019-01-06 PROBLEM — J44.1 COPD EXACERBATION (H): Status: ACTIVE | Noted: 2019-01-06

## 2019-01-06 LAB
GLUCOSE BLDC GLUCOMTR-MCNC: 234 MG/DL (ref 70–99)
GLUCOSE BLDC GLUCOMTR-MCNC: 245 MG/DL (ref 70–99)
GLUCOSE BLDC GLUCOMTR-MCNC: 280 MG/DL (ref 70–99)

## 2019-01-06 PROCEDURE — 25000125 ZZHC RX 250: Performed by: INTERNAL MEDICINE

## 2019-01-06 PROCEDURE — 94640 AIRWAY INHALATION TREATMENT: CPT | Mod: 76

## 2019-01-06 PROCEDURE — A9270 NON-COVERED ITEM OR SERVICE: HCPCS | Mod: GY | Performed by: PHYSICIAN ASSISTANT

## 2019-01-06 PROCEDURE — 25000132 ZZH RX MED GY IP 250 OP 250 PS 637: Mod: GY | Performed by: PHYSICIAN ASSISTANT

## 2019-01-06 PROCEDURE — 25000131 ZZH RX MED GY IP 250 OP 636 PS 637: Mod: GY | Performed by: INTERNAL MEDICINE

## 2019-01-06 PROCEDURE — 00000146 ZZHCL STATISTIC GLUCOSE BY METER IP

## 2019-01-06 PROCEDURE — 40000275 ZZH STATISTIC RCP TIME EA 10 MIN

## 2019-01-06 PROCEDURE — 94640 AIRWAY INHALATION TREATMENT: CPT

## 2019-01-06 PROCEDURE — 99239 HOSP IP/OBS DSCHRG MGMT >30: CPT | Performed by: INTERNAL MEDICINE

## 2019-01-06 RX ORDER — PREDNISONE 20 MG/1
40 TABLET ORAL DAILY
Qty: 4 TABLET | Refills: 0 | Status: ON HOLD | OUTPATIENT
Start: 2019-01-06 | End: 2019-05-02

## 2019-01-06 RX ADMIN — INSULIN GLARGINE 25 UNITS: 100 INJECTION, SOLUTION SUBCUTANEOUS at 08:56

## 2019-01-06 RX ADMIN — OMEGA-3-ACID ETHYL ESTERS 2 G: 1 CAPSULE, LIQUID FILLED ORAL at 08:53

## 2019-01-06 RX ADMIN — CARVEDILOL 12.5 MG: 12.5 TABLET, FILM COATED ORAL at 08:52

## 2019-01-06 RX ADMIN — ASPIRIN 81 MG: 81 TABLET, COATED ORAL at 08:52

## 2019-01-06 RX ADMIN — IPRATROPIUM BROMIDE AND ALBUTEROL SULFATE 3 ML: .5; 3 SOLUTION RESPIRATORY (INHALATION) at 12:03

## 2019-01-06 RX ADMIN — PREDNISONE 60 MG: 50 TABLET ORAL at 08:52

## 2019-01-06 RX ADMIN — CLONAZEPAM 0.5 MG: 0.5 TABLET ORAL at 00:42

## 2019-01-06 RX ADMIN — IPRATROPIUM BROMIDE AND ALBUTEROL SULFATE 3 ML: .5; 3 SOLUTION RESPIRATORY (INHALATION) at 08:07

## 2019-01-06 RX ADMIN — PANTOPRAZOLE SODIUM 40 MG: 40 TABLET, DELAYED RELEASE ORAL at 08:52

## 2019-01-06 RX ADMIN — INSULIN ASPART 2 UNITS: 100 INJECTION, SOLUTION INTRAVENOUS; SUBCUTANEOUS at 08:55

## 2019-01-06 RX ADMIN — SENNOSIDES AND DOCUSATE SODIUM 1 TABLET: 8.6; 5 TABLET ORAL at 08:52

## 2019-01-06 RX ADMIN — INSULIN ASPART 3 UNITS: 100 INJECTION, SOLUTION INTRAVENOUS; SUBCUTANEOUS at 12:42

## 2019-01-06 RX ADMIN — GABAPENTIN 300 MG: 300 CAPSULE ORAL at 08:52

## 2019-01-06 RX ADMIN — HYDRALAZINE HYDROCHLORIDE 25 MG: 25 TABLET ORAL at 08:52

## 2019-01-06 RX ADMIN — Medication 1 MG: at 00:42

## 2019-01-06 ASSESSMENT — ACTIVITIES OF DAILY LIVING (ADL)
ADLS_ACUITY_SCORE: 15

## 2019-01-06 NOTE — PROVIDER NOTIFICATION
Paged MD @ [phone]: Pt on tele, no tele orders, tele changed to BBB. Do you want Tele continued? Please add the tele order & requesting confirmatory EKG.    MD reapplied tele orders and pt is back to sinus rhythm... BBB resolved.

## 2019-01-06 NOTE — PLAN OF CARE
VSS. Tele - SR. Blood sugar 234 and 245.  Switched to PO prednisone yesterday for COPD. HD TuThSa - ran yesterday. Fistula to L) arm. Denies pain. Tolerating dialysis diet. Up SBA. Plan: discharge home this afternoon.       AVS reviewed with patient and brother. Questions asked and answered at this time. IV and telemetry removed. Patient belongings packed up by patient. Discharge medications sent to [facility]. Patient escorted to front of hospital with staff at 1415.

## 2019-01-06 NOTE — CONSULTS
Discharge Planner   Discharge Plans in progress: Follow up from CC visit. Pt is open to [facility] for RN./PT support. He would like to continue this support at baseline.   Barriers to discharge plan: None, uses a cane at baseline with good support from family . Pt has a spouse and 2 daughters.   Follow up plan: Paged MD for Home Care orders. Pt stated wife and brother could assist with transport        Entered by: Corinne C. White 01/06/2019 11:51 AM

## 2019-01-06 NOTE — PLAN OF CARE
PT A&O x4, up with SBA w/ cane, Tele SR w/ occ PVCs, VSS on RA, denies pain, SOB, headache, dizziness, and nausea, right PIV SL, had 1 loose BM this shift, , LS coarse w/ expiratory wheezes, nephrology and social work following, plan to discharge today, continue with current POC.

## 2019-01-06 NOTE — DISCHARGE SUMMARY
[facility]  Discharge Summary  Hospitalist      Date of Admission:  1/3/2019  Date of Discharge:  1/6/2019  Provider:  Sherice Bernardo MD  Date of Service (when I last saw the patient): 01/06/19      Primary Provider: Aida Thomas          Discharge Diagnosis:   Discharge Diagnoses   Acute respiratory failure with hypoxia  Acute on chronic bronchitis  Loose stools resolved  Acute on chronic anemia received 1 unit of  PRBCs  Stage liver disease on hemodialysis    Other medical issues:  Past Medical History:   Diagnosis Date     ACS (acute coronary syndrome) (H) 5/2/2016     Allergic rhinitis, cause unspecified      Anemia due to chronic kidney disease 10/21/2011     Bilateral pneumonia 1/7/2017     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, recurrent     Bradycardia 5/28/2016     CAD S/P percutaneous coronary angioplasty 6/15/2015     Cancer (H)      Chest pain      Dilated aortic root (H) 5/6/2016     ESRD (end stage renal disease) on dialysis (H) 5/6/2016     Generalized muscle weakness 9/8/2018     Hemodialysis-associated hypotension 5/22/2016     Human immunodeficiency virus (HIV) disease (H)      Hypertension 2010     Hypotension, unspecified hypotension type 5/22/2016     Impotence of organic origin      Increased prostate specific antigen (PSA) velocity 08/08/2016    Awaiting bx on blood thinner     Mixed hyperlipidemia      Near syncope 2016    with hemodialysis     NSTEMI (non-ST elevated myocardial infarction) (H) 12/2015, 5/2016     Pulmonary HTN (H)     Mod     TIA (transient ischemic attack) 5/2016     Type 2 diabetes mellitus (H) age 50     Unstable angina (H) 3/4/2016          History of Present Illness   Donald Raza is an 68 year old male who presented with shortness of breath.  Please see the admission history and physical for full details.    Hospital Course     Donald Raza was admitted on 1/3/2019.  He is a 68 year old male with past medical history significant for  end-stage renal disease on hemodialysis, HIV with CD4 count greater than 400 per patient, hyperlipidemia, coronary artery disease status post PCI, history of TIA, history of anemia with transfusions every 3-4 months, recent admission to [facility] for sepsis related to community acquired pneumonia presented to the emergency room with increased dry cough shortness of breath wheezing generalized weakness and was admitted  for acute respiratory distress with hypoxia and new onset diarrhea        The following problems were addressed during his hospitalization:    Acute respiratory failure with hypoxia  --Multifactorial secondary to COPD, Acute on chronic bronchitis, acute on chronic anemia,ckd  --Symptoms improved with nebulizer treatment  --Chest x-ray negative for pneumonia  --Started on IV steroids clinically improved we will switch to p.o.and complete short course of total of 5 days  --Duo nebs every 4 hours  --Continue Mucinex and Tessalon Perles     New onset diarrhea  --4 episodes of loose stools on the day of admission  --Was recently treated with Augmentin  --On enteric precautions  --No further loose stools in hospital stool studies were not obtained     Acute on chronic anemia  --Hemoglobin 6.7 on admission  --Transfused 1 unit of PRBCs  --Repeat hemoglobin stable     Chest pain with elevated troponin likely demand ischemia from underlying lung disease  --Troponins trending down.  Troponin leak suspected due to chronic kidney disease  --Recent Lexiscan 3 weeks ago with no signs of ischemia  --Repeat EKG unchanged  --Continue on aspirin and home meds  --Pain-free at this time  --CAD appears to be stable     Diabetes mellitus  --Sliding scale insulin  --We will need to monitor blood glucose closely while on prednisone     End-stage renal disease on hemodialysis  --Nephrology consulted for HD  -- dialysis as inpt     HIV Disease  --stable per patient          Significant Results and Procedures    As noted    Pending Results   Unresulted Labs Ordered in the Past 30 Days of this Admission     Date and Time Order Name Status Description    1/3/2019 1243 Lactic acid whole blood In process     1/3/2019 1219 Blood culture Preliminary     1/3/2019 1219 Blood culture Preliminary           Code Status   Full Code       Primary Care Physician   Aida Thomas    Physical Exam   Temp: 97.6  F (36.4  C) Temp src: Oral BP: 132/68 Pulse: 82 Heart Rate: 69 Resp: 20 SpO2: 96 % O2 Device: None (Room air) Oxygen Delivery: 3 LPM  Vitals:    01/03/19 1639 01/04/19 0259 01/05/19 1330   Weight: 83.8 kg (184 lb 12.8 oz) 85.5 kg (188 lb 6.4 oz) 85.5 kg (188 lb 7.9 oz)     Vital Signs with Ranges  Temp:  [96  F (35.6  C)-98.2  F (36.8  C)] 97.6  F (36.4  C)  Pulse:  [82] 82  Heart Rate:  [69-93] 69  Resp:  [18-24] 20  BP: (127-181)/(61-82) 132/68  SpO2:  [92 %-99 %] 96 %  I/O last 3 completed shifts:  In: 240 [P.O.:240]  Out: 2900 [Urine:100; Other:2800]    Constitutional: Awake, alert, cooperative, no apparent distress, and appears stated age.  Respiratory: No increased work of breathing, good air exchange, clear to auscultation bilaterally, no crackles or wheezing.  Cardiovascular: Normal apical impulse,normal S1 and S2,   GI:  normal bowel sounds, soft, non-distended, non-tender    Discharge Disposition   Discharged to home    Consultations This Hospital Stay   CARE COORDINATOR IP CONSULT  RESPIRATORY CARE IP CONSULT  SOCIAL WORK IP CONSULT  NEPHROLOGY IP CONSULT    Time Spent on this Encounter   I, Sherice Bernardo, personally saw the patient today and spent greater than 30 minutes discharging this patient.    Discharge Orders      Home care nursing referral      Home Care PT Referral for Hospital Discharge      Reason for your hospital stay    Please refer to discharge summary briefly admitted for shortness of breath which was thought to be multifactorial secondary to COPD exacerbation responded to nebs and steroids  and also due to volume overload from dialysis     Follow-up and recommended labs and tests     Follow up with primary care provider, Aida Thomas, within 7 days for hospital follow- up.  The following labs/tests are recommended: Blood glucose    This keep a record of your blood glucose as it will be up higher while on steroids  You will need to adjust your insulin needs once off steroids.  Please call or come if there are any new or worsening symptoms.     Activity    Your activity upon discharge: activity as tolerated     Monitor and record    blood glucose 4 times a day, before meals and at bedtime     MD face to face encounter    Documentation of Face to Face and Certification for Home Health Services    I certify that patient: Donald Raza is under my care and that I, or a nurse practitioner or physician's assistant working with me, had a face-to-face encounter that meets the physician face-to-face encounter requirements with this patient on: 1/6/2019.    This encounter with the patient was in whole, or in part, for the following medical condition, which is the primary reason for home health care: Deconditioning weakness, chronic kidney disease, COPD and diabetes mellitus    I certify that, based on my findings, the following services are medically necessary home health services: Nursing and Physical Therapy.    My clinical findings support the need for the above services because: Nurse is needed: To provide assessment and oversight required in the home to assure adherence to the medical plan due to: Complex medical regimen.  Adherence and compliance with treatment patient with multiple chronic comorbidities    Further, I certify that my clinical findings support that this patient is homebound (i.e. absences from home require considerable and taxing effort and are for medical reasons or [facility] services or infrequently or of short duration when for other reasons) because: Requires assistance of another  person or specialized equipment to access medical services because patient: Severe deconditioning..    Based on the above findings. I certify that this patient is confined to the home and needs intermittent skilled nursing care, physical therapy and/or speech therapy.  The patient is under my care, and I have initiated the establishment of the plan of care.  This patient will be followed by a physician who will periodically review the plan of care.  Physician/Provider to provide follow up care: Aida Thomas    Attending hospital physician (the Medicare certified [place] provider): Sherice Bernardo  Physician Signature: See electronic signature associated with these discharge orders.  Date: 1/6/2019     Full Code     Diet    Follow this diet upon discharge: Orders Placed This Encounter      Snacks/Supplements Adult: Nepro Oral Supplement; With Meals      Dialysis Diet     Discharge Medications   Current Discharge Medication List      START taking these medications    Details   predniSONE (DELTASONE) 20 MG tablet Take 40 mg by mouth daily for 2 days.  Qty: 4 tablet, Refills: 0    Associated Diagnoses: Chronic bronchitis, unspecified chronic bronchitis type (H)         CONTINUE these medications which have NOT CHANGED    Details   abacavir (ZIAGEN) 300 MG tablet Take 600 mg by mouth every evening       Acetaminophen (TYLENOL PO) Take 325 mg by mouth every 6 hours as needed for mild pain or fever       albuterol (PROAIR HFA/PROVENTIL HFA/VENTOLIN HFA) 108 (90 BASE) MCG/ACT Inhaler Inhale 2 puffs into the lungs every 6 hours as needed for shortness of breath / dyspnea or wheezing  Qty: 1 Inhaler, Refills: 1    Associated Diagnoses: Cough      aspirin EC 81 MG EC tablet Take 1 tablet (81 mg) by mouth daily  Qty: 30 tablet, Refills: 0    Associated Diagnoses: Coronary artery disease, angina presence unspecified, unspecified vessel or lesion type, unspecified whether native or transplanted heart      atorvastatin  (LIPITOR) 40 MG tablet Take 40 mg by mouth At Bedtime      B COMPLEX-C-FOLIC ACID PO Take 1 tablet by mouth At Bedtime       carvedilol (COREG) 12.5 MG tablet Take 1 tablet (12.5 mg) by mouth 2 times daily (with meals)  Qty: 60 tablet, Refills: 0    Associated Diagnoses: Hypertension secondary to other renal disorders      chlorhexidine (CHLORHEXIDINE) 0.12 % solution Rinse and gargle 15 ml by mouth twice a day as directed.      CLONAZEPAM PO Take 0.5 mg by mouth nightly as needed for anxiety (restless legs)      CLOPIDOGREL BISULFATE PO Take 75 mg by mouth every evening       dapsone 100 MG tablet Take 100 mg by mouth At Bedtime       Darunavir Ethanolate (PREZISTA PO) Take 800 mg by mouth At Bedtime.      dolutegravir (TIVICAY) 50 MG tablet Take 50 mg by mouth At Bedtime      gabapentin (NEURONTIN) 300 MG capsule Take 300 mg by mouth 2 times daily May take a third dose after dialysis.      guaiFENesin (MUCINEX) 600 MG 12 hr tablet Take by mouth as needed for congestion      hydrALAZINE (APRESOLINE) 25 MG tablet Take 1 tablet (25 mg) by mouth 3 times daily  Qty: 90 tablet, Refills: 0    Comments: Future refills by PCP Dr. Aida Thomas with phone number [phone].  Associated Diagnoses: Hypertension secondary to other renal disorders      imiquimod (ALDARA) 5 % cream Apply topically as needed      insulin glargine (LANTUS) 100 UNIT/ML injection Inject 25 Units Subcutaneous 2 times daily       !! Insulin Lispro (HUMALOG PEN SC) Take 15 units TID and an additional 2 units for every 50 mg/dL if BG is over 150      !! insulin lispro (HUMALOG PEN) 100 UNIT/ML pen Inject Subcutaneous 3 times daily (before meals) Sliding scale: takes 2 units for every 50 mg/dL over 150.      ipratropium - albuterol 0.5 mg/2.5 mg/3 mL (DUONEB) 0.5-2.5 (3) MG/3ML neb solution Take 1 vial (3 mLs) by nebulization every 6 hours as needed for shortness of breath / dyspnea or wheezing  Qty: 90 vial, Refills: 1    Associated Diagnoses:  "Acute respiratory failure with hypoxia (H)      irbesartan (AVAPRO) 300 MG tablet Take 1 tablet (300 mg) by mouth At Bedtime  Qty: 30 tablet, Refills: 0    Associated Diagnoses: Hypertension secondary to other renal disorders      iron sucrose (VENOFER) 20 MG/ML injection Inject 50 mg into the vein once a week WIth dialysis      Isosorbide Mononitrate  MG TB24 Take 1 tablet (120 mg) by mouth every evening  Qty: 30 tablet, Refills: 11    Associated Diagnoses: Coronary artery disease involving native heart, angina presence unspecified, unspecified vessel or lesion type; Hypertension secondary to other renal disorders      ketoconazole (NIZORAL) 2 % cream Apply topically 2 times daily as needed      MAGNESIUM OXIDE PO Take 400 mg by mouth At Bedtime      mirtazapine (REMERON) 15 MG tablet Take 15 mg by mouth At Bedtime      nitroglycerin (NITROSTAT) 0.4 MG SL tablet One tablet under the tongue every 5 minutes if needed for chest pain. May repeat every 5 minutes for a maximum of 3 doses in 15 minutes\"  Qty: 25 tablet, Refills: 3    Associated Diagnoses: Coronary artery disease due to lipid rich plaque; Status post coronary angioplasty      Nutritional Supplements (NEPRO PO) 1-3 times daily      omega-3 acid ethyl esters (LOVAZA) 1 G capsule Take 2 g by mouth 2 times daily      pantoprazole (PROTONIX) 40 MG EC tablet Take 40 mg by mouth daily      ritonavir (NORVIR) 100 MG capsule Take 1 capsule by mouth At Bedtime       sucroferric oxyhydroxide (VELPHORO) 500 MG CHEW chewable tablet Take 500 mg by mouth 3 times daily (with meals)      DOXERCALCIFEROL IV Inject 6 mcg into the vein three times a week (with dialysis)      epoetin brittni (EPOGEN,PROCRIT) 05894 UNIT/ML injection Inject 11,000 Units Subcutaneous three times a week WITH DIALYSIS      order for DME Equipment being ordered: Other: 4WW  Treatment Diagnosis: Decreased activity tolerance  Qty: 1 each, Refills: 0    Associated Diagnoses: SOB (shortness of " breath); Generalized muscle weakness       !! - Potential duplicate medications found. Please discuss with provider.        Allergies   Allergies   Allergen Reactions     Calcium Acetate Other (See Comments)     Other reaction(s): Other, see comments  Pain in chest and back  Pain in chest area (sensitive skin)      Diagnostic X-Ray Materials Other (See Comments)     PN: renal failure     Lisinopril      Sulfa Drugs      Data   Most Recent 3 CBC's:  Recent Labs   Lab Test 01/05/19  0829 01/04/19  0600 01/03/19  1243   WBC 4.4 2.5* 3.3*   HGB 7.6* 7.4* 6.7*   MCV 95 95 95   * 94* 103*      Most Recent 3 BMP's:  Recent Labs   Lab Test 01/05/19  0829 01/04/19  0600 01/03/19  1243   * 132* 134   POTASSIUM 4.8 4.4 3.8   CHLORIDE 91* 96 96   CO2 22 23 29   * 60* 39*   CR 9.78* 7.76* 5.52*   ANIONGAP 14 13 9   PRABHA 8.7 8.8 9.5   * 179* 138*     Most Recent 2 LFT's:  Recent Labs   Lab Test 01/03/19  1243 12/05/18  2140   AST 31 15   ALT 16 13   ALKPHOS 107 139   BILITOTAL 0.7 0.6     Most Recent INR's and Anticoagulation Dosing History:  Anticoagulation Dose History     Recent Dosing and Labs Latest Ref Rng & Units 1/8/2017 4/15/2017 9/19/2017 9/20/2017 10/10/2017 11/21/2017 12/5/2018    INR 0.86 - 1.14 1.41(H) 1.09 0.94 1.01 0.95 0.97 1.03        Most Recent 3 Troponin's:  Recent Labs   Lab Test 01/04/19  1200 01/04/19  0600 01/03/19  1747  03/08/18  0542  12/25/17  0856  04/15/17  0820   TROPI 0.049* 0.052* 0.088*   < > 0.043   < >  --    < >  --    TROPONIN  --   --   --   --  0.02  --  0.02  --  0.02    < > = values in this interval not displayed.     Most Recent Cholesterol Panel:  Recent Labs   Lab Test 09/02/18  0629   CHOL 127   LDL Cannot estimate LDL when triglyceride exceeds 400 mg/dL  48   HDL 26*   TRIG 447*     Most Recent 6 Bacteria Isolates From Any Culture (See EPIC Reports for Culture Details):  Recent Labs   Lab Test 01/03/19  1359 01/03/19  1242 12/06/18  0920 12/05/18  2210  12/05/18  2140 09/03/18  1824   CULT No growth after 3 days No growth after 3 days 10,000 to 50,000 colonies/mL  Streptococcus dysgalactiae serogroup C/G  *  10,000 to 50,000 colonies/mL  mixed urogenital subhash   No growth No growth No growth     Most Recent TSH, T4 and A1c Labs:  Recent Labs   Lab Test 12/06/18  0933  10/10/17  2225   TSH  --   --  2.19   A1C 4.7   < >  --     < > = values in this interval not displayed.     Results for orders placed or performed during the hospital encounter of 01/03/19   XR Chest 2 Views    Narrative    CHEST TWO VIEWS January 3, 2019 1:20 PM     HISTORY: Cough, chest pain.    COMPARISON: Chest x-ray from 12/5/2018.      Impression    IMPRESSION: Heart size is normal. Pulmonary vasculature is normal.  Lungs are clear. No pleural fluid. No acute disease.    ASHOK ECHEVERRIA MD           Disclaimer: This note consists of symbols derived from keyboarding, dictation and/or voice recognition software. As a result, there may be errors in the script that have gone undetected. Please consider this when interpreting information found in this chart.

## 2019-01-07 LAB — INTERPRETATION ECG - MUSE: NORMAL

## 2019-01-07 NOTE — PROGRESS NOTES
Transition Communication Hand-off for Care Transitions to Next Level of Care Provider    Name: Donald Raza  : 1950  MRN #: [id number]  Primary Care Provider: Aida Thomas  Primary Care MD Name: Dr. Aida Thomas  Primary Clinic: [facility] [address] [place] [id number]  Primary Care Clinic Name: [facility] [place]  Reason for Hospitalization:  Elevated troponin [R74.8]  ESRD (end stage renal disease) on dialysis (H) [N18.6, Z99.2]  Anemia, unspecified type [D64.9]  Exacerbation of asthma, unspecified asthma severity, unspecified whether persistent [J45.901]  Admit Date/Time: 1/3/2019 12:07 PM  Discharge Date:   Payor Source: Payor: MEDICARE / Plan: MEDICARE / Product Type: Medicare /     Readmission Assessment Measure (MUSHTAQ) Risk Score/category: Elevated  Discharge Needs Assessment:  Needs      Most Recent Value   Equipment Currently Used at Home  cane, straight   # of Referrals Placed by CTS  Communication hand-offs to next level of Care Providers   Other Resources  -- [Pt declined COPD Action Plan,  has multiple copies. ]   Home Care  [facility] [phone], Fax: [phone]        Follow-up plan:  No future appointments.    Any outstanding tests or procedures:        Referrals     Future Labs/Procedures    Home care nursing referral     Comments:    RN skilled nursing visit. RN to assess vital signs and weight, respiratory and cardiac status and patients ability to take and record daily blood pressure, temp and weight.  RN to teach medication management.  RN to provide assessment of blood glucose control and management of complex drugs.    Your provider has ordered home care nursing services. If you have not been contacted within 2 days of your discharge please call the inpatient department phone number at [phone] .    Home Care PT Referral for Hospital Discharge     Comments:    PT to eval and treat    Your provider has ordered home care -  physical therapy. If you have not been contacted within 2 days of your discharge please call the department phone number listed on the top of this document.            CTS identifies patient as high risk due to elevated MUSHTAQ score. Currently admitted for COPD exacerbation, this is his 4th admission in 6 months. Per chart review, pt resides at home with his wife, Jennifer, daughter & brother. Baseline mobility is he ambulates with a cane. His daughter is his PCA 9hrs/day M-F. He attends HD T, Th & Sat at [facility]. His AV fistual is on [facility].   He has greater than 10 prescription medications. An MTM referral was made on his last admission (12/14/18) and it was transferred to the [facility] system. He was reactivated with [facility] for RN & PT.       Pt was dc'd back home with [facility] RN/PT services. His new medication is for prednisone. He could benefit from clinic care coordination to follow with his complex needs and frequent admissions.     Ruby Hinojosa RN, BSN, CTS

## 2019-01-14 ENCOUNTER — TRANSFERRED RECORDS (OUTPATIENT)
Dept: HEALTH INFORMATION MANAGEMENT | Facility: CLINIC | Age: 69
End: 2019-01-14

## 2019-01-14 ENCOUNTER — HOSPITAL ENCOUNTER (OUTPATIENT)
Dept: ULTRASOUND IMAGING | Facility: CLINIC | Age: 69
Discharge: HOME | End: 2019-01-14
Attending: PHYSICIAN ASSISTANT | Admitting: PHYSICIAN ASSISTANT
Payer: MEDICARE

## 2019-01-14 DIAGNOSIS — N18.6 ESRD (END STAGE RENAL DISEASE) (H): ICD-10-CM

## 2019-01-14 DIAGNOSIS — T82.898A OTHER SPECIFIED COMPLICATION OF VASCULAR PROSTHETIC DEVICES, IMPLANTS AND GRAFTS, INITIAL ENCOUNTER (H): ICD-10-CM

## 2019-01-14 PROCEDURE — 93990 DOPPLER FLOW TESTING: CPT

## 2019-01-22 NOTE — PROGRESS NOTES
Dialysis Clinic: [facility] [phone]  Procedure: fistulagram left see ultra sound results  Arrival date: 1/25/19  Arrival time: 0900  NPO explained: yes                 Does patient have transportation available pre and post procedure?   yes  Check-in procedure explained: yes  Allergies reviewed: yes  Blood thinners: n/a  H&P:  Epic 1/3/19  Does patient require ?    no  If so, language?      services called to order ?    date requested:    arrival time requested:    Name of individual from  services assisting with scheduling appointment:

## 2019-01-25 ENCOUNTER — APPOINTMENT (OUTPATIENT)
Dept: INTERVENTIONAL RADIOLOGY/VASCULAR | Facility: CLINIC | Age: 69
End: 2019-01-25
Attending: RADIOLOGY
Payer: MEDICARE

## 2019-01-25 ENCOUNTER — HOSPITAL ENCOUNTER (OUTPATIENT)
Facility: CLINIC | Age: 69
Discharge: HOME | End: 2019-01-25
Attending: RADIOLOGY | Admitting: RADIOLOGY
Payer: MEDICARE

## 2019-01-25 VITALS
SYSTOLIC BLOOD PRESSURE: 174 MMHG | HEART RATE: 64 BPM | RESPIRATION RATE: 10 BRPM | TEMPERATURE: 97.4 F | DIASTOLIC BLOOD PRESSURE: 78 MMHG | OXYGEN SATURATION: 97 %

## 2019-01-25 DIAGNOSIS — N18.6 ESRD (END STAGE RENAL DISEASE) (H): ICD-10-CM

## 2019-01-25 LAB — GLUCOSE BLDC GLUCOMTR-MCNC: 190 MG/DL (ref 70–99)

## 2019-01-25 PROCEDURE — 25000125 ZZHC RX 250: Performed by: RADIOLOGY

## 2019-01-25 PROCEDURE — C1757 CATH, THROMBECTOMY/EMBOLECT: HCPCS

## 2019-01-25 PROCEDURE — 36907 BALO ANGIOP CTR DIALYSIS SEG: CPT

## 2019-01-25 PROCEDURE — 82962 GLUCOSE BLOOD TEST: CPT

## 2019-01-25 PROCEDURE — 40000556 ZZH STATISTIC PERIPHERAL IV START W US GUIDANCE

## 2019-01-25 PROCEDURE — 36902 INTRO CATH DIALYSIS CIRCUIT: CPT

## 2019-01-25 PROCEDURE — 25000128 H RX IP 250 OP 636: Performed by: RADIOLOGY

## 2019-01-25 PROCEDURE — 27210908 ZZH NEEDLE CR4

## 2019-01-25 PROCEDURE — 27210906 ZZH KIT CR8

## 2019-01-25 PROCEDURE — 27210742 ZZH CATH CR1

## 2019-01-25 PROCEDURE — C1769 GUIDE WIRE: HCPCS

## 2019-01-25 PROCEDURE — 76937 US GUIDE VASCULAR ACCESS: CPT

## 2019-01-25 PROCEDURE — 27210886 ZZH ACCESSORY CR5

## 2019-01-25 PROCEDURE — C1725 CATH, TRANSLUMIN NON-LASER: HCPCS

## 2019-01-25 PROCEDURE — 99152 MOD SED SAME PHYS/QHP 5/>YRS: CPT

## 2019-01-25 RX ORDER — NALOXONE HYDROCHLORIDE 0.4 MG/ML
.1-.4 INJECTION, SOLUTION INTRAMUSCULAR; INTRAVENOUS; SUBCUTANEOUS
Status: DISCONTINUED | OUTPATIENT
Start: 2019-01-25 | End: 2019-01-25 | Stop reason: HOSPADM

## 2019-01-25 RX ORDER — DEXTROSE MONOHYDRATE 25 G/50ML
25-50 INJECTION, SOLUTION INTRAVENOUS
Status: DISCONTINUED | OUTPATIENT
Start: 2019-01-25 | End: 2019-01-25 | Stop reason: HOSPADM

## 2019-01-25 RX ORDER — FENTANYL CITRATE 50 UG/ML
INJECTION, SOLUTION INTRAMUSCULAR; INTRAVENOUS
Status: DISCONTINUED
Start: 2019-01-25 | End: 2019-01-25 | Stop reason: HOSPADM

## 2019-01-25 RX ORDER — LIDOCAINE HYDROCHLORIDE 10 MG/ML
INJECTION, SOLUTION EPIDURAL; INFILTRATION; INTRACAUDAL; PERINEURAL
Status: DISCONTINUED
Start: 2019-01-25 | End: 2019-01-25 | Stop reason: HOSPADM

## 2019-01-25 RX ORDER — NICOTINE POLACRILEX 4 MG
15-30 LOZENGE BUCCAL
Status: DISCONTINUED | OUTPATIENT
Start: 2019-01-25 | End: 2019-01-25 | Stop reason: HOSPADM

## 2019-01-25 RX ORDER — FENTANYL CITRATE 50 UG/ML
25-50 INJECTION, SOLUTION INTRAMUSCULAR; INTRAVENOUS EVERY 5 MIN PRN
Status: DISCONTINUED | OUTPATIENT
Start: 2019-01-25 | End: 2019-01-25 | Stop reason: HOSPADM

## 2019-01-25 RX ORDER — HYDRALAZINE HYDROCHLORIDE 20 MG/ML
10 INJECTION INTRAMUSCULAR; INTRAVENOUS ONCE
Status: COMPLETED | OUTPATIENT
Start: 2019-01-25 | End: 2019-01-25

## 2019-01-25 RX ORDER — LIDOCAINE 40 MG/G
CREAM TOPICAL
Status: DISCONTINUED | OUTPATIENT
Start: 2019-01-25 | End: 2019-01-25 | Stop reason: HOSPADM

## 2019-01-25 RX ORDER — FLUMAZENIL 0.1 MG/ML
0.2 INJECTION, SOLUTION INTRAVENOUS
Status: DISCONTINUED | OUTPATIENT
Start: 2019-01-25 | End: 2019-01-25 | Stop reason: HOSPADM

## 2019-01-25 RX ORDER — LIDOCAINE HYDROCHLORIDE 10 MG/ML
1-30 INJECTION, SOLUTION EPIDURAL; INFILTRATION; INTRACAUDAL; PERINEURAL
Status: DISCONTINUED | OUTPATIENT
Start: 2019-01-25 | End: 2019-01-25 | Stop reason: HOSPADM

## 2019-01-25 RX ORDER — LIDOCAINE HYDROCHLORIDE 10 MG/ML
1-30 INJECTION, SOLUTION EPIDURAL; INFILTRATION; INTRACAUDAL; PERINEURAL
Status: COMPLETED | OUTPATIENT
Start: 2019-01-25 | End: 2019-01-25

## 2019-01-25 RX ADMIN — HYDRALAZINE HYDROCHLORIDE 10 MG: 20 INJECTION INTRAMUSCULAR; INTRAVENOUS at 11:22

## 2019-01-25 RX ADMIN — HEPARIN SODIUM 10000 UNITS: 10000 INJECTION, SOLUTION INTRAVENOUS; SUBCUTANEOUS at 10:06

## 2019-01-25 RX ADMIN — MIDAZOLAM HYDROCHLORIDE 1 MG: 1 INJECTION, SOLUTION INTRAMUSCULAR; INTRAVENOUS at 10:51

## 2019-01-25 RX ADMIN — FENTANYL CITRATE 50 MCG: 50 INJECTION, SOLUTION INTRAMUSCULAR; INTRAVENOUS at 10:51

## 2019-01-25 RX ADMIN — LIDOCAINE HYDROCHLORIDE 5 ML: 10 INJECTION, SOLUTION EPIDURAL; INFILTRATION; INTRACAUDAL; PERINEURAL at 10:52

## 2019-01-25 NOTE — PROCEDURES
RADIOLOGY POST PROCEDURE NOTE w/ SEDATION  Patient name: Donald Raza  MRN: [id number]  : 1950    Pre-procedure diagnosis: ESRD  Post-procedure diagnosis: Same    Procedure Date/Time: 2019  11:42 AM  Procedure:  Fistulagram  cPTA central cephalic arch  cPTA outflow vein  Estimated blood loss: None  Specimen(s) collected with description: none    I determined this patient to be an appropriate candidate for the planned sedation and procedure and reassessed the patient IMMEDIATELY PRIOR to sedation and procedure.     The patient tolerated the procedure well with no immediate complications.  Significant findings:  Pulsatility and high pressures partially 2/2 severe aneurysmal dilation and tortuosity of outflow.    See imaging dictation for procedural details.    Provider name: Danny Clancy  Assistant(s):None

## 2019-01-25 NOTE — PROGRESS NOTES
Post Procedure Summary:  Prior to the start of the procedure and with procedural staff participation, I verbally confirmed the patient s identity using two indicators, relevant allergies, that the procedure was appropriate and matched the consent or emergent situation, and that the correct equipment/implants were available. Immediately prior to starting the procedure I conducted the Time Out with the procedural staff and re-confirmed the patient s name, procedure, and site/side. (The Joint Commission universal protocol was followed.)  Yes       Sedatives: Fentanyl and Midazolam (Versed)    Vital signs, airway and pulse oximetry were monitored and remained stable throughout the procedure and sedation was maintained until the procedure was complete.  The patient was monitored by staff until sedation discharge criteria were met.    Patient tolerance: Patient tolerated the procedure well with no immediate complications.    Time of sedation in minutes: 30 Minutes minutes from beginning to end of physician one to one monitoring.

## 2019-01-25 NOTE — DISCHARGE INSTRUCTIONS
Invasive Radiology Procedures Discharge Instructions         _____________________________________________________________________    Patient Name:  Donald Raza  Today's Date:  January 25, 2019    The doctor who did your procedure today was Dr. Clancy.    Procedure:  Fistulagram:  After a Fistulagram -    If you go home with sheaths in place, keep the arm straight. Don t bend or use your arm until after dialysis.     For the first 24 hours, keep the arm raised on pillows as much as you can. This reduces swelling.     If you have bleeding or swelling at the puncture site, use your fingertip to put gentle pressure on the area. Do this until the bleeding stops or the swelling goes down.     If you lose the pulse in your fistula, or you no longer fill a thrill (buzzing feeling), call your doctor or dialysis clinic          If you have not received your results after 5 days, please call the doctor who ordered your test.  Diet and medicines    Go back to your normal diet.    You may start taking your normal medicines again (including Coumadin, or warfarin), as shown on your medicine sheet.    If you take aspirin, Plavix or other anti-platelet drugs: Start taking it tomorrow.    If take Coumadin (warfarin): Ask your doctor when to have your INR checked.    For minor pain, you may take Tylenol (acetaminophen) or Advil (ibuprofen).    Activity and puncture site     You may go back to normal activity in 24 hours. Wait 48 hours before lifting,   straining, exercise or other strenuous activity.    For the next day or two, check your puncture site often while you are awake.    Change the Band-Aid or bandage tomorrow.    You may shower tomorrow morning. No bathing or swimming until your   puncture site has fully healed.    If you received IV medicine to sedate you:  You were given: Versed  and Fentanyl  You may feel drowsy, forgetful or unsteady. For the next 24 hours, do not drive, drink alcohol or make any important  decisions.    Know when to call for help  Call your doctor if you have:    A fever greater over 101 F (38.3 C), taken under the tongue.    A lot of bleeding or swelling at your puncture site.    Pain that is getting worse.     Shortness of breath.  Call 911 or go the emergency room if you have:    Severe chest pain or trouble breathing.    A tube that falls out.     Increased blood in your sputum (phlegm).    Bleeding that you cannot control.   Important phone numbers:  [facility] at [phone]

## 2019-01-25 NOTE — IP AVS SNAPSHOT
MRN:[id number]                      After Visit Summary   1/25/2019    Donald Raza    MRN: [id number]           Visit Information        Department      1/25/2019  9:14 AM [facility]          Review of your medicines      CONTINUE these medicines which have NOT CHANGED       Dose / Directions   abacavir 300 MG tablet  Commonly known as:  ZIAGEN      Dose:  600 mg  Take 600 mg by mouth every evening  Refills:  0     albuterol 108 (90 Base) MCG/ACT inhaler  Commonly known as:  PROAIR HFA/PROVENTIL HFA/VENTOLIN HFA  Used for:  Cough      Dose:  2 puff  Inhale 2 puffs into the lungs every 6 hours as needed for shortness of breath / dyspnea or wheezing  Quantity:  1 Inhaler  Refills:  1     aspirin 81 MG EC tablet  Commonly known as:  ASA  Used for:  Coronary artery disease, angina presence unspecified, unspecified vessel or lesion type, unspecified whether native or transplanted heart      Dose:  81 mg  Take 1 tablet (81 mg) by mouth daily  Quantity:  30 tablet  Refills:  0     atorvastatin 40 MG tablet  Commonly known as:  LIPITOR      Dose:  40 mg  Take 40 mg by mouth At Bedtime  Refills:  0     B COMPLEX-C-FOLIC ACID PO      Dose:  1 tablet  Take 1 tablet by mouth At Bedtime  Refills:  0     carvedilol 12.5 MG tablet  Commonly known as:  COREG  Used for:  Hypertension secondary to other renal disorders      Dose:  12.5 mg  Take 1 tablet (12.5 mg) by mouth 2 times daily (with meals)  Quantity:  60 tablet  Refills:  0     chlorhexidine 0.12 % solution  Commonly known as:  PERIDEX      Rinse and gargle 15 ml by mouth twice a day as directed.  Refills:  0     CLONAZEPAM PO      Dose:  0.5 mg  Take 0.5 mg by mouth nightly as needed for anxiety (restless legs)  Refills:  0     CLOPIDOGREL BISULFATE PO      Dose:  75 mg  Take 75 mg by mouth every evening  Refills:  0     dapsone 100 MG tablet  Commonly known as:  ACZONE      Dose:  100 mg  Take 100 mg by mouth At Bedtime  Refills:  0      dolutegravir 50 MG tablet  Commonly known as:  TIVICAY      Dose:  50 mg  Take 50 mg by mouth At Bedtime  Refills:  0     DOXERCALCIFEROL IV      Dose:  6 mcg  Inject 6 mcg into the vein three times a week (with dialysis)  Refills:  0     epoetin brittni 68201 UNIT/ML injection  Commonly known as:  EPOGEN/PROCRIT      Dose:  79895 Units  Inject 11,000 Units Subcutaneous three times a week WITH DIALYSIS  Refills:  0     gabapentin 300 MG capsule  Commonly known as:  NEURONTIN      Dose:  300 mg  Take 300 mg by mouth 2 times daily May take a third dose after dialysis.  Refills:  0     guaiFENesin 600 MG 12 hr tablet  Commonly known as:  MUCINEX      Take by mouth as needed for congestion  Refills:  0     * HUMALOG PEN SC      Take 15 units TID and an additional 2 units for every 50 mg/dL if BG is over 150  Refills:  0     * insulin lispro 100 UNIT/ML pen  Commonly known as:  HumaLOG PEN      Inject Subcutaneous 3 times daily (before meals) Sliding scale: takes 2 units for every 50 mg/dL over 150.  Refills:  0     hydrALAZINE 25 MG tablet  Commonly known as:  APRESOLINE  Used for:  Hypertension secondary to other renal disorders      Dose:  25 mg  Take 1 tablet (25 mg) by mouth 3 times daily  Quantity:  90 tablet  Refills:  0     imiquimod 5 % external cream  Commonly known as:  ALDARA      Apply topically as needed  Refills:  0     insulin glargine 100 UNIT/ML pen  Commonly known as:  LANTUS PEN      Dose:  25 Units  Inject 25 Units Subcutaneous 2 times daily  Refills:  0     ipratropium - albuterol 0.5 mg/2.5 mg/3 mL 0.5-2.5 (3) MG/3ML neb solution  Commonly known as:  DUONEB  Used for:  Acute respiratory failure with hypoxia (H)      Dose:  1 vial  Take 1 vial (3 mLs) by nebulization every 6 hours as needed for shortness of breath / dyspnea or wheezing  Quantity:  90 vial  Refills:  1     irbesartan 300 MG tablet  Commonly known as:  AVAPRO  Used for:  Hypertension secondary to other renal disorders      Dose:  300  "mg  Take 1 tablet (300 mg) by mouth At Bedtime  Quantity:  30 tablet  Refills:  0     iron sucrose 20 MG/ML injection  Commonly known as:  VENOFER      Dose:  50 mg  Inject 50 mg into the vein once a week WIth dialysis  Refills:  0     isosorbide mononitrate  MG 24 HR ER tablet  Commonly known as:  IMDUR  Used for:  Coronary artery disease involving native heart, angina presence unspecified, unspecified vessel or lesion type, Hypertension secondary to other renal disorders      Dose:  120 mg  Take 1 tablet (120 mg) by mouth every evening  Quantity:  30 tablet  Refills:  11     ketoconazole 2 % external cream  Commonly known as:  NIZORAL      Apply topically 2 times daily as needed  Refills:  0     MAGNESIUM OXIDE PO      Dose:  400 mg  Take 400 mg by mouth At Bedtime  Refills:  0     mirtazapine 15 MG tablet  Commonly known as:  REMERON      Dose:  15 mg  Take 15 mg by mouth At Bedtime  Refills:  0     NEPRO PO      1-3 times daily  Refills:  0     nitroGLYcerin 0.4 MG sublingual tablet  Commonly known as:  NITROSTAT  Used for:  Coronary artery disease due to lipid rich plaque [414.00, 414.3], Status post coronary angioplasty      One tablet under the tongue every 5 minutes if needed for chest pain. May repeat every 5 minutes for a maximum of 3 doses in 15 minutes\"  Quantity:  25 tablet  Refills:  3     omega-3 acid ethyl esters 1 g capsule  Commonly known as:  LOVAZA      Dose:  2 g  Take 2 g by mouth 2 times daily  Refills:  0     order for DME  Used for:  SOB (shortness of breath), Generalized muscle weakness      Equipment being ordered: Other: 4WW  Treatment Diagnosis: Decreased activity tolerance  Quantity:  1 each  Refills:  0     pantoprazole 40 MG EC tablet  Commonly known as:  PROTONIX      Dose:  40 mg  Take 40 mg by mouth daily  Refills:  0     PREZISTA PO      Dose:  800 mg  Take 800 mg by mouth At Bedtime.  Refills:  0     ritonavir 100 MG capsule  Commonly known as:  NORVIR      Dose:  1 " capsule  Take 1 capsule by mouth At Bedtime  Refills:  0     TYLENOL PO      Dose:  325 mg  Take 325 mg by mouth every 6 hours as needed for mild pain or fever  Refills:  0     VELPHORO 500 MG Chew chewable tablet  Generic drug:  sucroferric oxyhydroxide      Dose:  500 mg  Take 500 mg by mouth 3 times daily (with meals)  Refills:  0         * This list has 2 medication(s) that are the same as other medications prescribed for you. Read the directions carefully, and ask your doctor or other care provider to review them with you.                  Protect others around you: Learn how to safely use, store and throw away your medicines at www.disposemymeds.org.       Follow-ups after your visit       Care Instructions       Further instructions from your care team         Invasive Radiology Procedures Discharge Instructions         _____________________________________________________________________    Patient Name:  Donald Guevaraido  Today's Date:  January 25, 2019    The doctor who did your procedure today was Dr. Clancy.    Procedure:  Fistulagram:  After a Fistulagram -    If you go home with sheaths in place, keep the arm straight. Don t bend or use your arm until after dialysis.     For the first 24 hours, keep the arm raised on pillows as much as you can. This reduces swelling.     If you have bleeding or swelling at the puncture site, use your fingertip to put gentle pressure on the area. Do this until the bleeding stops or the swelling goes down.     If you lose the pulse in your fistula, or you no longer fill a thrill (buzzing feeling), call your doctor or dialysis clinic          If you have not received your results after 5 days, please call the doctor who ordered your test.  Diet and medicines    Go back to your normal diet.    You may start taking your normal medicines again (including Coumadin, or warfarin), as shown on your medicine sheet.    If you take aspirin, Plavix or other anti-platelet drugs: Start  "taking it tomorrow.    If take Coumadin (warfarin): Ask your doctor when to have your INR checked.    For minor pain, you may take Tylenol (acetaminophen) or Advil (ibuprofen).    Activity and puncture site     You may go back to normal activity in 24 hours. Wait 48 hours before lifting,   straining, exercise or other strenuous activity.    For the next day or two, check your puncture site often while you are awake.    Change the Band-Aid or bandage tomorrow.    You may shower tomorrow morning. No bathing or swimming until your   puncture site has fully healed.    If you received IV medicine to sedate you:  You were given: Versed  and Fentanyl  You may feel drowsy, forgetful or unsteady. For the next 24 hours, do not drive, drink alcohol or make any important decisions.    Know when to call for help  Call your doctor if you have:    A fever greater over 101 F (38.3 C), taken under the tongue.    A lot of bleeding or swelling at your puncture site.    Pain that is getting worse.     Shortness of breath.  Call 911 or go the emergency room if you have:    Severe chest pain or trouble breathing.    A tube that falls out.     Increased blood in your sputum (phlegm).    Bleeding that you cannot control.   Important phone numbers:  [facility] at [phone]    Additional Information About Your Visit       [employer] Information    [employer] lets you send messages to your doctor, view your test results, renew your prescriptions, schedule appointments and more. To sign up, go to [email or web address] . Click on \"Log in\" on the left side of the screen, which will take you to the Welcome page. Then click on \"Sign up Now\" on the right side of the page.     You will be asked to enter the access code listed below, as well as some personal information. Please follow the directions to create your username and password.     Your access code is: [email or web address]  Expires: 3/26/2019 11:56 AM     Your access code will "  in 90 days. If you need help or a new code, please call your [place] clinic or [phone].       Care EveryWhere ID    This is your Care EveryWhere ID. This could be used by other organizations to access your [place] medical records  [id number]       Your Vitals Were     Blood Pressure   202/94   (BP Location: Right arm)    Pulse   66    Temperature   97.4  F (36.3  C) (Temporal)    Respirations   10    Pulse Oximetry   99%          Primary Care Provider Office Phone # Fax #    Aida Thomas [phone]      Equal Access to Services    [facility]: Hadii aad ku hadasho Soomaali, waaxda luqadaha, qaybta kaalmada adelindayada, fatoumata culver . So [place] [phone].    ATENCIÓN: Si habla español, tiene a ayala disposición servicios gratuitos de asistencia lingüística. Llame al [phone].    We comply with applicable federal civil rights laws and [place] laws. We do not discriminate on the basis of race, color, national origin, age, disability, sex, sexual orientation, or gender identity.           Thank you!    Thank you for choosing [facility] for your care. Our goal is always to provide you with excellent care. Hearing back from our patients is one way we can continue to improve our services. Please take a few minutes to complete the written survey that you may receive in the mail after you visit. If you would like to speak to someone directly about your visit please contact Patient Relations at [phone]. Thank you!              Medication List      Medications       Morning Afternoon Evening Bedtime As Needed   abacavir 300 MG tablet  Also known as:  ZIAGEN  INSTRUCTIONS:  Take 600 mg by mouth every evening                    albuterol 108 (90 Base) MCG/ACT inhaler  Also known as:  PROAIR HFA/PROVENTIL HFA/VENTOLIN HFA  INSTRUCTIONS:  Inhale 2 puffs into the lungs every 6 hours as needed for shortness of breath / dyspnea or wheezing                     aspirin 81 MG EC tablet  Also known as:  ASA  INSTRUCTIONS:  Take 1 tablet (81 mg) by mouth daily                    atorvastatin 40 MG tablet  Also known as:  LIPITOR  INSTRUCTIONS:  Take 40 mg by mouth At Bedtime                    B COMPLEX-C-FOLIC ACID PO  INSTRUCTIONS:  Take 1 tablet by mouth At Bedtime                    carvedilol 12.5 MG tablet  Also known as:  COREG  INSTRUCTIONS:  Take 1 tablet (12.5 mg) by mouth 2 times daily (with meals)                    chlorhexidine 0.12 % solution  Also known as:  PERIDEX  INSTRUCTIONS:  Rinse and gargle 15 ml by mouth twice a day as directed.                    CLONAZEPAM PO  INSTRUCTIONS:  Take 0.5 mg by mouth nightly as needed for anxiety (restless legs)                    CLOPIDOGREL BISULFATE PO  INSTRUCTIONS:  Take 75 mg by mouth every evening                    dapsone 100 MG tablet  Also known as:  ACZONE  INSTRUCTIONS:  Take 100 mg by mouth At Bedtime                    dolutegravir 50 MG tablet  Also known as:  TIVICAY  INSTRUCTIONS:  Take 50 mg by mouth At Bedtime                    DOXERCALCIFEROL IV  INSTRUCTIONS:  Inject 6 mcg into the vein three times a week (with dialysis)                    epoetin brittni 58075 UNIT/ML injection  Also known as:  EPOGEN/PROCRIT  INSTRUCTIONS:  Inject 11,000 Units Subcutaneous three times a week WITH DIALYSIS                    gabapentin 300 MG capsule  Also known as:  NEURONTIN  INSTRUCTIONS:  Take 300 mg by mouth 2 times daily May take a third dose after dialysis.                    guaiFENesin 600 MG 12 hr tablet  Also known as:  MUCINEX  INSTRUCTIONS:  Take by mouth as needed for congestion                    * HUMALOG PEN SC  INSTRUCTIONS:  Take 15 units TID and an additional 2 units for every 50 mg/dL if BG is over 150                    * insulin lispro 100 UNIT/ML pen  Also known as:  HumaLOG PEN  INSTRUCTIONS:  Inject Subcutaneous 3 times daily (before meals) Sliding scale: takes 2 units for  "every 50 mg/dL over 150.                    hydrALAZINE 25 MG tablet  Also known as:  APRESOLINE  INSTRUCTIONS:  Take 1 tablet (25 mg) by mouth 3 times daily  Doctor's comments:  Future refills by PCP Dr. Aida Thomas with phone number [phone].  LAST TAKEN:  Ask your nurse or doctor                    imiquimod 5 % external cream  Also known as:  ALDARA  INSTRUCTIONS:  Apply topically as needed                    insulin glargine 100 UNIT/ML pen  Also known as:  LANTUS PEN  INSTRUCTIONS:  Inject 25 Units Subcutaneous 2 times daily                    ipratropium - albuterol 0.5 mg/2.5 mg/3 mL 0.5-2.5 (3) MG/3ML neb solution  Also known as:  DUONEB  INSTRUCTIONS:  Take 1 vial (3 mLs) by nebulization every 6 hours as needed for shortness of breath / dyspnea or wheezing                    irbesartan 300 MG tablet  Also known as:  AVAPRO  INSTRUCTIONS:  Take 1 tablet (300 mg) by mouth At Bedtime                    iron sucrose 20 MG/ML injection  Also known as:  VENOFER  INSTRUCTIONS:  Inject 50 mg into the vein once a week WIth dialysis                    isosorbide mononitrate  MG 24 HR ER tablet  Also known as:  IMDUR  INSTRUCTIONS:  Take 1 tablet (120 mg) by mouth every evening                    ketoconazole 2 % external cream  Also known as:  NIZORAL  INSTRUCTIONS:  Apply topically 2 times daily as needed                    MAGNESIUM OXIDE PO  INSTRUCTIONS:  Take 400 mg by mouth At Bedtime                    mirtazapine 15 MG tablet  Also known as:  REMERON  INSTRUCTIONS:  Take 15 mg by mouth At Bedtime                    NEPRO PO  INSTRUCTIONS:  1-3 times daily                    nitroGLYcerin 0.4 MG sublingual tablet  Also known as:  NITROSTAT  INSTRUCTIONS:  One tablet under the tongue every 5 minutes if needed for chest pain. May repeat every 5 minutes for a maximum of 3 doses in 15 minutes\"                    omega-3 acid ethyl esters 1 g capsule  Also known as:  LOVAZA  INSTRUCTIONS:  Take 2 " g by mouth 2 times daily                    order for DME  INSTRUCTIONS:  Equipment being ordered: Other: 4WW  Treatment Diagnosis: Decreased activity tolerance                    pantoprazole 40 MG EC tablet  Also known as:  PROTONIX  INSTRUCTIONS:  Take 40 mg by mouth daily                    PREZISTA PO  INSTRUCTIONS:  Take 800 mg by mouth At Bedtime.                    ritonavir 100 MG capsule  Also known as:  NORVIR  INSTRUCTIONS:  Take 1 capsule by mouth At Bedtime                    TYLENOL PO  INSTRUCTIONS:  Take 325 mg by mouth every 6 hours as needed for mild pain or fever                    VELPHORO 500 MG Chew chewable tablet  INSTRUCTIONS:  Take 500 mg by mouth 3 times daily (with meals)  Generic drug:  sucroferric oxyhydroxide                        * This list has 2 medication(s) that are the same as other medications prescribed for you. Read the directions carefully, and ask your doctor or other care provider to review them with you.

## 2019-01-25 NOTE — IP AVS SNAPSHOT
[facility]  [address]  [facility] [address]  Phone:  [phone]                                    After Visit Summary   1/25/2019    Donald Raza    MRN: [id number]           After Visit Summary Signature Page    I have received my discharge instructions, and my questions have been answered. I have discussed any challenges I see with this plan with the nurse or doctor.    ..........................................................................................................................................  Patient/Patient Representative Signature      ..........................................................................................................................................  Patient Representative Print Name and Relationship to Patient    ..................................................               ................................................  Date                                   Time    ..........................................................................................................................................  Reviewed by Signature/Title    ...................................................              ..............................................  Date                                               Time          22EPIC Rev 08/18

## 2019-01-29 ENCOUNTER — HOSPITAL ENCOUNTER (OUTPATIENT)
Facility: CLINIC | Age: 69
Setting detail: OBSERVATION
Discharge: HOME | End: 2019-01-30
Attending: EMERGENCY MEDICINE | Admitting: INTERNAL MEDICINE
Payer: MEDICARE

## 2019-01-29 ENCOUNTER — APPOINTMENT (OUTPATIENT)
Dept: GENERAL RADIOLOGY | Facility: CLINIC | Age: 69
End: 2019-01-29
Payer: MEDICARE

## 2019-01-29 DIAGNOSIS — Z99.2 ESRD ON HEMODIALYSIS (H): ICD-10-CM

## 2019-01-29 DIAGNOSIS — D64.9 CHRONIC ANEMIA: ICD-10-CM

## 2019-01-29 DIAGNOSIS — E87.5 HYPERKALEMIA: ICD-10-CM

## 2019-01-29 DIAGNOSIS — R07.9 CHEST PAIN, UNSPECIFIED TYPE: ICD-10-CM

## 2019-01-29 DIAGNOSIS — Z21 ASYMPTOMATIC HUMAN IMMUNODEFICIENCY VIRUS (HIV) INFECTION STATUS (H): ICD-10-CM

## 2019-01-29 DIAGNOSIS — N18.6 ESRD ON HEMODIALYSIS (H): ICD-10-CM

## 2019-01-29 DIAGNOSIS — R79.89 ELEVATED TROPONIN I LEVEL: ICD-10-CM

## 2019-01-29 DIAGNOSIS — Z99.2 DIALYSIS PATIENT (H): Primary | ICD-10-CM

## 2019-01-29 LAB
ABO + RH BLD: NORMAL
ABO + RH BLD: NORMAL
ALBUMIN SERPL-MCNC: 3.5 G/DL (ref 3.4–5)
ALP SERPL-CCNC: 138 U/L (ref 40–150)
ALT SERPL W P-5'-P-CCNC: 17 U/L (ref 0–70)
ANION GAP SERPL CALCULATED.3IONS-SCNC: 15 MMOL/L (ref 3–14)
AST SERPL W P-5'-P-CCNC: 18 U/L (ref 0–45)
BASE DEFICIT BLDV-SCNC: 3.3 MMOL/L
BASOPHILS # BLD AUTO: 0 10E9/L (ref 0–0.2)
BASOPHILS NFR BLD AUTO: 0.5 %
BILIRUB SERPL-MCNC: 0.4 MG/DL (ref 0.2–1.3)
BLD GP AB SCN SERPL QL: NORMAL
BLD PROD TYP BPU: NORMAL
BLD UNIT ID BPU: 0
BLOOD BANK CMNT PATIENT-IMP: NORMAL
BLOOD PRODUCT CODE: NORMAL
BPU ID: NORMAL
BUN SERPL-MCNC: 97 MG/DL (ref 7–30)
CALCIUM SERPL-MCNC: 8.9 MG/DL (ref 8.5–10.1)
CHLORIDE SERPL-SCNC: 106 MMOL/L (ref 94–109)
CO2 SERPL-SCNC: 20 MMOL/L (ref 20–32)
CREAT SERPL-MCNC: 11.5 MG/DL (ref 0.66–1.25)
DIFFERENTIAL METHOD BLD: ABNORMAL
EOSINOPHIL # BLD AUTO: 0.1 10E9/L (ref 0–0.7)
EOSINOPHIL NFR BLD AUTO: 2.5 %
ERYTHROCYTE [DISTWIDTH] IN BLOOD BY AUTOMATED COUNT: 16.4 % (ref 10–15)
GFR SERPL CREATININE-BSD FRML MDRD: 4 ML/MIN/{1.73_M2}
GLUCOSE BLDC GLUCOMTR-MCNC: 128 MG/DL (ref 70–99)
GLUCOSE BLDC GLUCOMTR-MCNC: 152 MG/DL (ref 70–99)
GLUCOSE BLDC GLUCOMTR-MCNC: 156 MG/DL (ref 70–99)
GLUCOSE BLDC GLUCOMTR-MCNC: 80 MG/DL (ref 70–99)
GLUCOSE SERPL-MCNC: 192 MG/DL (ref 70–99)
HCO3 BLDV-SCNC: 23 MMOL/L (ref 21–28)
HCT VFR BLD AUTO: 22.5 % (ref 40–53)
HGB BLD-MCNC: 6.7 G/DL (ref 13.3–17.7)
IMM GRANULOCYTES # BLD: 0 10E9/L (ref 0–0.4)
IMM GRANULOCYTES NFR BLD: 0.5 %
INTERPRETATION ECG - MUSE: NORMAL
LYMPHOCYTES # BLD AUTO: 0.8 10E9/L (ref 0.8–5.3)
LYMPHOCYTES NFR BLD AUTO: 20.3 %
MCH RBC QN AUTO: 30.2 PG (ref 26.5–33)
MCHC RBC AUTO-ENTMCNC: 29.8 G/DL (ref 31.5–36.5)
MCV RBC AUTO: 101 FL (ref 78–100)
MONOCYTES # BLD AUTO: 0.3 10E9/L (ref 0–1.3)
MONOCYTES NFR BLD AUTO: 7.6 %
NEUTROPHILS # BLD AUTO: 2.7 10E9/L (ref 1.6–8.3)
NEUTROPHILS NFR BLD AUTO: 68.6 %
NRBC # BLD AUTO: 0 10*3/UL
NRBC BLD AUTO-RTO: 0 /100
NT-PROBNP SERPL-MCNC: ABNORMAL PG/ML (ref 0–900)
NUM BPU REQUESTED: 1
O2/TOTAL GAS SETTING VFR VENT: NORMAL %
PCO2 BLDV: 44 MM HG (ref 40–50)
PH BLDV: 7.32 PH (ref 7.32–7.43)
PLATELET # BLD AUTO: 121 10E9/L (ref 150–450)
PO2 BLDV: 44 MM HG (ref 25–47)
POTASSIUM SERPL-SCNC: 5.6 MMOL/L (ref 3.4–5.3)
PROT SERPL-MCNC: 7.3 G/DL (ref 6.8–8.8)
RBC # BLD AUTO: 2.22 10E12/L (ref 4.4–5.9)
SODIUM SERPL-SCNC: 141 MMOL/L (ref 133–144)
SPECIMEN EXP DATE BLD: NORMAL
TRANSFUSION STATUS PATIENT QL: NORMAL
TRANSFUSION STATUS PATIENT QL: NORMAL
TROPONIN I SERPL-MCNC: 0.05 UG/L (ref 0–0.04)
TROPONIN I SERPL-MCNC: 0.05 UG/L (ref 0–0.04)
WBC # BLD AUTO: 3.9 10E9/L (ref 4–11)

## 2019-01-29 PROCEDURE — 80053 COMPREHEN METABOLIC PANEL: CPT | Performed by: EMERGENCY MEDICINE

## 2019-01-29 PROCEDURE — G0257 UNSCHED DIALYSIS ESRD PT HOS: HCPCS

## 2019-01-29 PROCEDURE — 83880 ASSAY OF NATRIURETIC PEPTIDE: CPT | Performed by: EMERGENCY MEDICINE

## 2019-01-29 PROCEDURE — G0378 HOSPITAL OBSERVATION PER HR: HCPCS

## 2019-01-29 PROCEDURE — 86706 HEP B SURFACE ANTIBODY: CPT | Performed by: INTERNAL MEDICINE

## 2019-01-29 PROCEDURE — 71046 X-RAY EXAM CHEST 2 VIEWS: CPT

## 2019-01-29 PROCEDURE — 85025 COMPLETE CBC W/AUTO DIFF WBC: CPT | Performed by: EMERGENCY MEDICINE

## 2019-01-29 PROCEDURE — 36430 TRANSFUSION BLD/BLD COMPNT: CPT

## 2019-01-29 PROCEDURE — 86850 RBC ANTIBODY SCREEN: CPT | Performed by: EMERGENCY MEDICINE

## 2019-01-29 PROCEDURE — A9270 NON-COVERED ITEM OR SERVICE: HCPCS | Mod: GY | Performed by: INTERNAL MEDICINE

## 2019-01-29 PROCEDURE — 63400005 ZZH RX 634: Performed by: INTERNAL MEDICINE

## 2019-01-29 PROCEDURE — 25000128 H RX IP 250 OP 636: Performed by: INTERNAL MEDICINE

## 2019-01-29 PROCEDURE — 93005 ELECTROCARDIOGRAM TRACING: CPT

## 2019-01-29 PROCEDURE — 84484 ASSAY OF TROPONIN QUANT: CPT | Performed by: INTERNAL MEDICINE

## 2019-01-29 PROCEDURE — 99285 EMERGENCY DEPT VISIT HI MDM: CPT | Mod: 25

## 2019-01-29 PROCEDURE — 84484 ASSAY OF TROPONIN QUANT: CPT | Mod: 91 | Performed by: EMERGENCY MEDICINE

## 2019-01-29 PROCEDURE — 36415 COLL VENOUS BLD VENIPUNCTURE: CPT | Performed by: EMERGENCY MEDICINE

## 2019-01-29 PROCEDURE — 25000132 ZZH RX MED GY IP 250 OP 250 PS 637: Mod: GY | Performed by: INTERNAL MEDICINE

## 2019-01-29 PROCEDURE — 00000146 ZZHCL STATISTIC GLUCOSE BY METER IP

## 2019-01-29 PROCEDURE — G0499 HEPB SCREEN HIGH RISK INDIV: HCPCS | Performed by: INTERNAL MEDICINE

## 2019-01-29 PROCEDURE — 86901 BLOOD TYPING SEROLOGIC RH(D): CPT | Performed by: EMERGENCY MEDICINE

## 2019-01-29 PROCEDURE — 25000131 ZZH RX MED GY IP 250 OP 636 PS 637: Mod: GY | Performed by: INTERNAL MEDICINE

## 2019-01-29 PROCEDURE — 96372 THER/PROPH/DIAG INJ SC/IM: CPT | Mod: XS

## 2019-01-29 PROCEDURE — 86900 BLOOD TYPING SEROLOGIC ABO: CPT | Performed by: EMERGENCY MEDICINE

## 2019-01-29 PROCEDURE — 82803 BLOOD GASES ANY COMBINATION: CPT | Performed by: EMERGENCY MEDICINE

## 2019-01-29 PROCEDURE — 86923 COMPATIBILITY TEST ELECTRIC: CPT | Performed by: EMERGENCY MEDICINE

## 2019-01-29 PROCEDURE — 36415 COLL VENOUS BLD VENIPUNCTURE: CPT | Performed by: INTERNAL MEDICINE

## 2019-01-29 PROCEDURE — P9016 RBC LEUKOCYTES REDUCED: HCPCS | Performed by: EMERGENCY MEDICINE

## 2019-01-29 PROCEDURE — 90937 HEMODIALYSIS REPEATED EVAL: CPT

## 2019-01-29 PROCEDURE — 99220 ZZC INITIAL OBSERVATION CARE,LEVL III: CPT | Performed by: INTERNAL MEDICINE

## 2019-01-29 RX ORDER — ACETAMINOPHEN 325 MG/1
650 TABLET ORAL EVERY 4 HOURS PRN
Status: DISCONTINUED | OUTPATIENT
Start: 2019-01-29 | End: 2019-01-30 | Stop reason: HOSPADM

## 2019-01-29 RX ORDER — DEXTROSE MONOHYDRATE 25 G/50ML
25-50 INJECTION, SOLUTION INTRAVENOUS
Status: DISCONTINUED | OUTPATIENT
Start: 2019-01-29 | End: 2019-01-30 | Stop reason: HOSPADM

## 2019-01-29 RX ORDER — HEPARIN SODIUM 1000 [USP'U]/ML
500 INJECTION, SOLUTION INTRAVENOUS; SUBCUTANEOUS
Status: COMPLETED | OUTPATIENT
Start: 2019-01-29 | End: 2019-01-29

## 2019-01-29 RX ORDER — RITONAVIR 100 MG/1
100 TABLET ORAL AT BEDTIME
Status: DISCONTINUED | OUTPATIENT
Start: 2019-01-29 | End: 2019-01-30 | Stop reason: HOSPADM

## 2019-01-29 RX ORDER — LIDOCAINE 40 MG/G
CREAM TOPICAL
Status: DISCONTINUED | OUTPATIENT
Start: 2019-01-29 | End: 2019-01-30 | Stop reason: HOSPADM

## 2019-01-29 RX ORDER — CARVEDILOL 12.5 MG/1
12.5 TABLET ORAL 2 TIMES DAILY WITH MEALS
Status: DISCONTINUED | OUTPATIENT
Start: 2019-01-29 | End: 2019-01-30 | Stop reason: HOSPADM

## 2019-01-29 RX ORDER — CLOPIDOGREL BISULFATE 75 MG/1
75 TABLET ORAL EVERY EVENING
Status: DISCONTINUED | OUTPATIENT
Start: 2019-01-29 | End: 2019-01-30 | Stop reason: HOSPADM

## 2019-01-29 RX ORDER — NALOXONE HYDROCHLORIDE 0.4 MG/ML
.1-.4 INJECTION, SOLUTION INTRAMUSCULAR; INTRAVENOUS; SUBCUTANEOUS
Status: DISCONTINUED | OUTPATIENT
Start: 2019-01-29 | End: 2019-01-30 | Stop reason: HOSPADM

## 2019-01-29 RX ORDER — GUAIFENESIN 600 MG/1
1200 TABLET, EXTENDED RELEASE ORAL 2 TIMES DAILY PRN
Status: ON HOLD | COMMUNITY
End: 2020-01-01

## 2019-01-29 RX ORDER — ASPIRIN 81 MG/1
81 TABLET ORAL DAILY
Status: DISCONTINUED | OUTPATIENT
Start: 2019-01-29 | End: 2019-01-30 | Stop reason: HOSPADM

## 2019-01-29 RX ORDER — DAPSONE 100 MG/1
100 TABLET ORAL AT BEDTIME
Status: DISCONTINUED | OUTPATIENT
Start: 2019-01-29 | End: 2019-01-30 | Stop reason: HOSPADM

## 2019-01-29 RX ORDER — GABAPENTIN 300 MG/1
300 CAPSULE ORAL 2 TIMES DAILY
COMMUNITY

## 2019-01-29 RX ORDER — MAGNESIUM OXIDE 400 MG/1
400 TABLET ORAL AT BEDTIME
Status: DISCONTINUED | OUTPATIENT
Start: 2019-01-29 | End: 2019-01-30 | Stop reason: HOSPADM

## 2019-01-29 RX ORDER — NITROGLYCERIN 0.4 MG/1
0.4 TABLET SUBLINGUAL EVERY 5 MIN PRN
Status: DISCONTINUED | OUTPATIENT
Start: 2019-01-29 | End: 2019-01-30 | Stop reason: HOSPADM

## 2019-01-29 RX ORDER — GUAIFENESIN 600 MG/1
1200 TABLET, EXTENDED RELEASE ORAL 2 TIMES DAILY
Status: DISCONTINUED | OUTPATIENT
Start: 2019-01-29 | End: 2019-01-30 | Stop reason: HOSPADM

## 2019-01-29 RX ORDER — ACETAMINOPHEN 650 MG/1
650 SUPPOSITORY RECTAL EVERY 4 HOURS PRN
Status: DISCONTINUED | OUTPATIENT
Start: 2019-01-29 | End: 2019-01-30 | Stop reason: HOSPADM

## 2019-01-29 RX ORDER — ALBUTEROL SULFATE 90 UG/1
2 AEROSOL, METERED RESPIRATORY (INHALATION) EVERY 6 HOURS PRN
Status: DISCONTINUED | OUTPATIENT
Start: 2019-01-29 | End: 2019-01-30 | Stop reason: HOSPADM

## 2019-01-29 RX ORDER — ONDANSETRON 4 MG/1
4 TABLET, ORALLY DISINTEGRATING ORAL EVERY 6 HOURS PRN
Status: DISCONTINUED | OUTPATIENT
Start: 2019-01-29 | End: 2019-01-30 | Stop reason: HOSPADM

## 2019-01-29 RX ORDER — GABAPENTIN 300 MG/1
300 CAPSULE ORAL 2 TIMES DAILY
Status: DISCONTINUED | OUTPATIENT
Start: 2019-01-29 | End: 2019-01-30 | Stop reason: HOSPADM

## 2019-01-29 RX ORDER — HYDRALAZINE HYDROCHLORIDE 25 MG/1
25 TABLET, FILM COATED ORAL 3 TIMES DAILY
Status: DISCONTINUED | OUTPATIENT
Start: 2019-01-29 | End: 2019-01-30 | Stop reason: HOSPADM

## 2019-01-29 RX ORDER — CLONAZEPAM 0.5 MG/1
0.5 TABLET ORAL
Status: DISCONTINUED | OUTPATIENT
Start: 2019-01-29 | End: 2019-01-30 | Stop reason: HOSPADM

## 2019-01-29 RX ORDER — ATORVASTATIN CALCIUM 40 MG/1
40 TABLET, FILM COATED ORAL AT BEDTIME
Status: DISCONTINUED | OUTPATIENT
Start: 2019-01-29 | End: 2019-01-30 | Stop reason: HOSPADM

## 2019-01-29 RX ORDER — CHLORHEXIDINE GLUCONATE ORAL RINSE 1.2 MG/ML
15 SOLUTION DENTAL 2 TIMES DAILY
Status: DISCONTINUED | OUTPATIENT
Start: 2019-01-29 | End: 2019-01-30 | Stop reason: HOSPADM

## 2019-01-29 RX ORDER — MIRTAZAPINE 15 MG/1
15 TABLET, FILM COATED ORAL AT BEDTIME
Status: DISCONTINUED | OUTPATIENT
Start: 2019-01-29 | End: 2019-01-30 | Stop reason: HOSPADM

## 2019-01-29 RX ORDER — ONDANSETRON 2 MG/ML
4 INJECTION INTRAMUSCULAR; INTRAVENOUS EVERY 6 HOURS PRN
Status: DISCONTINUED | OUTPATIENT
Start: 2019-01-29 | End: 2019-01-30 | Stop reason: HOSPADM

## 2019-01-29 RX ORDER — NICOTINE POLACRILEX 4 MG
15-30 LOZENGE BUCCAL
Status: DISCONTINUED | OUTPATIENT
Start: 2019-01-29 | End: 2019-01-30 | Stop reason: HOSPADM

## 2019-01-29 RX ORDER — ASPIRIN 81 MG/1
81 TABLET ORAL DAILY
Status: DISCONTINUED | OUTPATIENT
Start: 2019-01-29 | End: 2019-01-29

## 2019-01-29 RX ORDER — ALBUMIN (HUMAN) 12.5 G/50ML
50 SOLUTION INTRAVENOUS
Status: DISCONTINUED | OUTPATIENT
Start: 2019-01-29 | End: 2019-01-30 | Stop reason: HOSPADM

## 2019-01-29 RX ORDER — PANTOPRAZOLE SODIUM 40 MG/1
40 TABLET, DELAYED RELEASE ORAL DAILY
Status: DISCONTINUED | OUTPATIENT
Start: 2019-01-29 | End: 2019-01-30 | Stop reason: HOSPADM

## 2019-01-29 RX ORDER — CLOPIDOGREL BISULFATE 75 MG/1
75 TABLET ORAL DAILY
Status: DISCONTINUED | OUTPATIENT
Start: 2019-01-29 | End: 2019-01-29

## 2019-01-29 RX ORDER — POLYETHYLENE GLYCOL 3350 17 G/17G
17 POWDER, FOR SOLUTION ORAL DAILY PRN
Status: DISCONTINUED | OUTPATIENT
Start: 2019-01-29 | End: 2019-01-30 | Stop reason: HOSPADM

## 2019-01-29 RX ORDER — IPRATROPIUM BROMIDE AND ALBUTEROL SULFATE 2.5; .5 MG/3ML; MG/3ML
1 SOLUTION RESPIRATORY (INHALATION) EVERY 6 HOURS PRN
Status: DISCONTINUED | OUTPATIENT
Start: 2019-01-29 | End: 2019-01-30 | Stop reason: HOSPADM

## 2019-01-29 RX ORDER — AMOXICILLIN 250 MG
1 CAPSULE ORAL 2 TIMES DAILY PRN
Status: DISCONTINUED | OUTPATIENT
Start: 2019-01-29 | End: 2019-01-30 | Stop reason: HOSPADM

## 2019-01-29 RX ORDER — ISOSORBIDE MONONITRATE 60 MG/1
120 TABLET, EXTENDED RELEASE ORAL EVERY EVENING
Status: DISCONTINUED | OUTPATIENT
Start: 2019-01-29 | End: 2019-01-30 | Stop reason: HOSPADM

## 2019-01-29 RX ORDER — DOXERCALCIFEROL 4 UG/2ML
4 INJECTION INTRAVENOUS
Status: COMPLETED | OUTPATIENT
Start: 2019-01-29 | End: 2019-01-29

## 2019-01-29 RX ORDER — AMOXICILLIN 250 MG
2 CAPSULE ORAL 2 TIMES DAILY PRN
Status: DISCONTINUED | OUTPATIENT
Start: 2019-01-29 | End: 2019-01-30 | Stop reason: HOSPADM

## 2019-01-29 RX ORDER — ABACAVIR 300 MG/1
600 TABLET ORAL EVERY EVENING
Status: DISCONTINUED | OUTPATIENT
Start: 2019-01-29 | End: 2019-01-30 | Stop reason: HOSPADM

## 2019-01-29 RX ORDER — NITROGLYCERIN 0.4 MG/1
0.4 TABLET SUBLINGUAL EVERY 5 MIN PRN
Status: DISCONTINUED | OUTPATIENT
Start: 2019-01-29 | End: 2019-01-29

## 2019-01-29 RX ORDER — CARVEDILOL 12.5 MG/1
12.5 TABLET ORAL 2 TIMES DAILY WITH MEALS
Status: DISCONTINUED | OUTPATIENT
Start: 2019-01-29 | End: 2019-01-29

## 2019-01-29 RX ORDER — IRBESARTAN 300 MG/1
300 TABLET ORAL AT BEDTIME
Status: DISCONTINUED | OUTPATIENT
Start: 2019-01-29 | End: 2019-01-30 | Stop reason: HOSPADM

## 2019-01-29 RX ORDER — HEPARIN SODIUM 1000 [USP'U]/ML
500 INJECTION, SOLUTION INTRAVENOUS; SUBCUTANEOUS CONTINUOUS
Status: DISCONTINUED | OUTPATIENT
Start: 2019-01-29 | End: 2019-01-30 | Stop reason: HOSPADM

## 2019-01-29 RX ORDER — BISACODYL 10 MG
10 SUPPOSITORY, RECTAL RECTAL DAILY PRN
Status: DISCONTINUED | OUTPATIENT
Start: 2019-01-29 | End: 2019-01-30 | Stop reason: HOSPADM

## 2019-01-29 RX ADMIN — HYDRALAZINE HYDROCHLORIDE 25 MG: 25 TABLET ORAL at 18:51

## 2019-01-29 RX ADMIN — CARVEDILOL 12.5 MG: 12.5 TABLET, FILM COATED ORAL at 10:59

## 2019-01-29 RX ADMIN — HYDRALAZINE HYDROCHLORIDE 25 MG: 25 TABLET ORAL at 21:52

## 2019-01-29 RX ADMIN — ATORVASTATIN CALCIUM 40 MG: 40 TABLET, FILM COATED ORAL at 21:55

## 2019-01-29 RX ADMIN — HEPARIN SODIUM 500 UNITS: 1000 INJECTION INTRAVENOUS; SUBCUTANEOUS at 14:04

## 2019-01-29 RX ADMIN — EPOETIN ALFA 8000 UNITS: 4000 SOLUTION INTRAVENOUS; SUBCUTANEOUS at 14:13

## 2019-01-29 RX ADMIN — DOXERCALCIFEROL 4 MCG: 4 INJECTION, SOLUTION INTRAVENOUS at 14:11

## 2019-01-29 RX ADMIN — GUAIFENESIN 1200 MG: 600 TABLET, EXTENDED RELEASE ORAL at 11:00

## 2019-01-29 RX ADMIN — MAGNESIUM OXIDE TAB 400 MG (241.3 MG ELEMENTAL MG) 400 MG: 400 (241.3 MG) TAB at 21:54

## 2019-01-29 RX ADMIN — DAPSONE 100 MG: 100 TABLET ORAL at 21:54

## 2019-01-29 RX ADMIN — DOLUTEGRAVIR SODIUM 50 MG: 50 TABLET, FILM COATED ORAL at 21:55

## 2019-01-29 RX ADMIN — GABAPENTIN 300 MG: 300 CAPSULE ORAL at 11:00

## 2019-01-29 RX ADMIN — CHLORHEXIDINE GLUCONATE 15 ML: 1.2 RINSE ORAL at 11:01

## 2019-01-29 RX ADMIN — HYDRALAZINE HYDROCHLORIDE 25 MG: 25 TABLET ORAL at 11:00

## 2019-01-29 RX ADMIN — CLOPIDOGREL BISULFATE 75 MG: 75 TABLET, FILM COATED ORAL at 19:55

## 2019-01-29 RX ADMIN — Medication 1 CAPSULE: at 21:55

## 2019-01-29 RX ADMIN — PANTOPRAZOLE SODIUM 40 MG: 40 TABLET, DELAYED RELEASE ORAL at 10:59

## 2019-01-29 RX ADMIN — CARVEDILOL 12.5 MG: 12.5 TABLET, FILM COATED ORAL at 18:51

## 2019-01-29 RX ADMIN — GABAPENTIN 300 MG: 300 CAPSULE ORAL at 19:56

## 2019-01-29 RX ADMIN — INSULIN ASPART 15 UNITS: 100 INJECTION, SOLUTION INTRAVENOUS; SUBCUTANEOUS at 11:02

## 2019-01-29 RX ADMIN — CHLORHEXIDINE GLUCONATE 15 ML: 1.2 RINSE ORAL at 19:56

## 2019-01-29 RX ADMIN — GUAIFENESIN 1200 MG: 600 TABLET, EXTENDED RELEASE ORAL at 19:55

## 2019-01-29 RX ADMIN — SODIUM CHLORIDE 250 ML: 9 INJECTION, SOLUTION INTRAVENOUS at 14:10

## 2019-01-29 RX ADMIN — ASPIRIN 81 MG: 81 TABLET, COATED ORAL at 11:01

## 2019-01-29 RX ADMIN — ABACAVIR 600 MG: 300 TABLET, FILM COATED ORAL at 19:55

## 2019-01-29 RX ADMIN — DARUNAVIR 800 MG: 800 TABLET, FILM COATED ORAL at 21:54

## 2019-01-29 RX ADMIN — SODIUM CHLORIDE 300 ML: 9 INJECTION, SOLUTION INTRAVENOUS at 16:39

## 2019-01-29 RX ADMIN — HEPARIN SODIUM 500 UNITS/HR: 1000 INJECTION, SOLUTION INTRAVENOUS; SUBCUTANEOUS at 14:05

## 2019-01-29 RX ADMIN — MIRTAZAPINE 15 MG: 15 TABLET, FILM COATED ORAL at 21:54

## 2019-01-29 RX ADMIN — INSULIN GLARGINE 25 UNITS: 100 INJECTION, SOLUTION SUBCUTANEOUS at 21:52

## 2019-01-29 RX ADMIN — ACETAMINOPHEN 650 MG: 325 TABLET, FILM COATED ORAL at 19:55

## 2019-01-29 RX ADMIN — INSULIN GLARGINE 25 UNITS: 100 INJECTION, SOLUTION SUBCUTANEOUS at 11:57

## 2019-01-29 RX ADMIN — INSULIN ASPART 15 UNITS: 100 INJECTION, SOLUTION INTRAVENOUS; SUBCUTANEOUS at 15:48

## 2019-01-29 RX ADMIN — RITONAVIR 100 MG: 100 TABLET, FILM COATED ORAL at 21:55

## 2019-01-29 RX ADMIN — ISOSORBIDE MONONITRATE 120 MG: 60 TABLET, EXTENDED RELEASE ORAL at 19:55

## 2019-01-29 RX ADMIN — IRBESARTAN 300 MG: 300 TABLET ORAL at 22:13

## 2019-01-29 ASSESSMENT — ENCOUNTER SYMPTOMS
SHORTNESS OF BREATH: 1
FEVER: 0
COUGH: 0

## 2019-01-29 ASSESSMENT — MIFFLIN-ST. JEOR
SCORE: 1662.13
SCORE: 1672.99
SCORE: 1673.13

## 2019-01-29 NOTE — PROGRESS NOTES
Pt arrived to floor from ER. Transferred from stretcher to bed with cane. Reports he 'needs blood' and feels weak and SOB w exertion. O2 via nc 2 L in place. Normal dialysis run days Tues, Thurs & sat. Limb alert to SANDR[facility]

## 2019-01-29 NOTE — ED PROVIDER NOTES
History     Chief Complaint:  Chest pain     HPI   Donlad Raza is a 68 year old male with a history of COPD, CAD, ESRD on dialysis, HIV who presents to the emergency department today with chest pain. Patient reports his pain started around 2100 or 2200 last night with associated shortness of breath. The pain is on the right side of the chest. He took 2 Nitroglycerin and an Aspirin at home. Patient having 98% on 4 Liters. Today would be his normal day for dialysis. He denies fever, new cough.     Admitted from 1/3/19 to 1/6/19 for respiratory issues. Last CD4 count was greater than 400. He got a blood transfusion. Patient states he did not receive the blood transfusion. He also had diarrhea, shortness of breath treated with steroids.     Last echo was in September: showed an ejection fracture of 60-65% with early diastolic dysfunction. He had a nuclear medicine stress test in December that was unremarkable.     Allergies:  Calcium Acetate  Diagnostic X-Ray Materials  Lisinopril  Sulfa Drugs    Medications:    Abacavir (Ziagen) 300 Mg Tablet  Acetaminophen (Tylenol Po)  Albuterol (Proair Hfa/Proventil Hfa/ventolin Hfa) 108 (90 Base) Mcg/act Inhaler  Aspirin Ec 81 Mg Ec Tablet  Atorvastatin (Lipitor) 40 Mg Tablet  B Complex-c-folic Acid Po  Carvedilol (Coreg) 12.5 Mg Tablet  Chlorhexidine (Chlorhexidine) 0.12 % Solution  Clonazepam Po  Clopidogrel Bisulfate Po  Dapsone 100 Mg Tablet  Darunavir Ethanolate (Prezista Po)  Dolutegravir (Tivicay) 50 Mg Tablet  Doxercalciferol Iv  Epoetin Dwayne (Epogen,procrit) 73230 Unit/ml Injection  Gabapentin (Neurontin) 300 Mg Capsule  Guaifenesin (Mucinex) 600 Mg 12 Hr Tablet  Hydralazine (Apresoline) 25 Mg Tablet  Imiquimod (Aldara) 5 % Cream  Insulin Glargine (Lantus) 100 Unit/ml Injection  Insulin Lispro (Humalog Pen Sc)  Insulin Lispro (Humalog Pen) 100 Unit/ml Pen  Ipratropium - Albuterol 0.5 Mg/2.5 Mg/3 Ml (Duoneb) 0.5-2.5 (3) Mg/3ml Neb Solution  Irbesartan (Avapro) 300 Mg  Tablet  Iron Sucrose (Venofer) 20 Mg/ml Injection  Isosorbide Mononitrate Cr 120 Mg Tb24  Ketoconazole (Nizoral) 2 % Cream  Magnesium Oxide Po  Mirtazapine (Remeron) 15 Mg Tablet  Nitroglycerin (Nitrostat) 0.4 Mg Sl Tablet  Nutritional Supplements (Nepro Po)  Omega-3 Acid Ethyl Esters (Lovaza) 1 G Capsule  Pantoprazole (Protonix) 40 Mg Ec Tablet  Ritonavir (Norvir) 100 Mg Capsule  Sucroferric Oxyhydroxide (Velphoro) 500 Mg Chew Chewable Tablet    Past Medical History:    Past Medical History:   Diagnosis Date     Allergic rhinitis      Anemia due to chronic kidney disease 10/21/2011     CAD S/P percutaneous coronary angioplasty 6/15/2015     Cataract      CKD (chronic kidney disease)      Colon cancer (H)      COPD (chronic obstructive pulmonary disease) (H)      Depression      Dilated aortic root (H) 5/6/2016     Diverticulitis      ESRD (end stage renal disease) on dialysis (H) 5/6/2016     Gout      Human immunodeficiency virus (HIV) disease (H)      Hx of squamous cell carcinoma 03/23/2007     Hypertension 2010     Impotence of organic origin      Increased prostate specific antigen (PSA) velocity 08/08/2016    Awaiting bx on blood thinner     Mixed hyperlipidemia      Pulmonary HTN (H)     Mod     TIA (transient ischemic attack) 5/2016     Type 2 diabetes mellitus (H) age 50         Past Surgical History:    Angiogram  Appendectomy  Cholecystectomy  Colon surgery  Colostomy  Left ear catheterization  Heart cath  Fistulogram  Coronary stent     Family History:    Heart disease  Kidney disease  Hypertension    Social History:  The patient was alone.   Smoking Status: Former  Smokeless Tobacco: Never  Alcohol Use: No   Marital Status:      Review of Systems   Constitutional: Negative for fever.   Respiratory: Positive for shortness of breath. Negative for cough.    Cardiovascular: Positive for chest pain (right side).   All other systems reviewed and are negative.      Physical Exam     Patient Vitals for  "the past 24 hrs:   BP Temp Temp src Heart Rate Resp SpO2 Height Weight   01/29/19 0500 169/83 -- -- 76 16 96 % -- --   01/29/19 0445 167/87 -- -- 74 16 94 % -- --   01/29/19 0430 157/82 -- -- 75 16 96 % -- --   01/29/19 0415 -- -- -- 71 17 -- -- --   01/29/19 0400 -- -- -- 76 18 -- -- --   01/29/19 0330 -- -- -- 75 26 97 % -- --   01/29/19 0315 -- -- -- 79 13 94 % -- --   01/29/19 0310 -- 97.5  F (36.4  C) Oral -- -- -- -- --   01/29/19 0303 185/96 -- -- 81 20 94 % 1.803 m (5' 11\") 88 kg (194 lb 0.1 oz)      Physical Exam  Nursing note and vitals reviewed.  Constitutional: Cooperative.   HENT:   Mouth/Throat: Mucous membranes are normal.   Cardiovascular: Normal rate, regular rhythm and normal heart sounds.  No murmur.  Pulmonary/Chest: Effort normal and breath sounds normal. No respiratory distress. No wheezes. No rales.   Abdominal: Soft. Normal appearance and bowel sounds are normal. No distension. There is no tenderness. There is no rigidity and no guarding.   Musculoskeletal: No LE edema. Dialysis fistula in the left upper extremity, normal thrill. No edema on the legs.   Neurological: Alert. Oriented x4  Skin: Skin is warm and dry. No rash noted.   Psychiatric: Normal mood and affect.      Emergency Department Course     ECG:  Indication: chest pain  Completed at 0305.  Read at 0306.   Normal sinus rhythm   Abnormal QRS-T angle, consider primary T wave abnormality  Rate 83 bpm. NM interval 172. QRS duration 90. QT/QTc 372/437. P-R-T axes 44 29 94.     Imaging:  Radiology findings were communicated with the patient who voiced understanding of the findings.  XR Chest 2 Views   Final Result   IMPRESSION:   1. Mildly increased interstitial opacities in the left lung are   nonspecific, but could relate to an infectious/inflammatory process or   asymmetric interstitial pulmonary edema.   2. No other findings suspicious for active cardiopulmonary disease.      EN KINSEY MD      Report per radiology  "     Laboratory:  Laboratory findings were communicated with the patient who voiced understanding of the findings.  Lab Results   Component Value Date    ABO O 01/29/2019    RH Pos 01/29/2019     CBC: AWNL (WBC 3.9, HGB 6.7, )  CMP: 5.6 K, 15 Anion gap, 192 Glucose, 97 urea nitrogen, 4 GFR o/w WNL (Creatinine 11.50)     BNP: 30,633  Troponin (Collected 0316): 0.048    Blood gas venous: pH Venous 7.32, PCO2 Venous 44, PO2 Venous 44, Bicarbonate 23, Base Deficit 3.3, FIO2 RA      Emergency Department Course:  Nursing notes and vitals reviewed.  0313: I performed an exam of the patient as documented above.   IV was inserted and blood was drawn for laboratory testing, results above.  The patient was sent for a Chest XR while in the emergency department, results above.    0400: I discussed the patient's case with Dr. Tolliver, the Hospitalist, who would like to come down and evaluate patient.   0402: Findings and plan explained to the Patient who consents to admission. Discussed the patient with Dr. Tolliver, who will admit the patient to a Cardiac/Tele bed for further monitoring, evaluation, and treatment.   I personally reviewed the laboratory and imaging results with the Patient and answered all related questions prior to admission.     Impression & Plan    Medical Decision Making:  Donald Raza is a 68 year old male with a history of HIV, end stage renal disease on dialysis, known coronary artery disease, who presents with chest pain and shortness of breath. He was a recent admission for shortness of breath with details in the HPI. Here his workup has bene essentially baseline for him. He has chronic anemia with a hemoglobin of 6.7 here. He is scheduled for dialysis tomorrow so I would wait until after dialysis to see what his hemoglobin is before emergently transfusing him, as he is not hypoxic at this time. His troponin is elevated but a stable and chronic level compared to were he usually is with his renal  insufficiency. EKG does not show any acute or new ischemic changes. Chest XR is fairly unremarkable. There is perhaps some increase in pulmonary vasculature, consistent with mild CHF. His last echocardiogram showed an EF of 60-65% with mild diastolic dysfunction. I suspect these Chest XR changes will improve with dialysis, which can be done in the hospital. Given his age, complexity, and previous coronary history, one troponin will not be sufficient to effectively rule out though I doubt NSTEMI or new cardiac symptoms, especially with reassuring nuclear medicine stress test in December. Patient is comfortable with plan for admission. Plan for serial troponins, nephrology consult for dialysis and possible red blood cell transfusion.     Diagnosis:    ICD-10-CM   1. Chest pain, unspecified type R07.9   2. Chronic anemia D64.9   3. ESRD on hemodialysis  N18.6    Z99.2   4. Hyperkalemia E87.5   5. Elevated troponin I level R74.8   6. Asymptomatic human immunodeficiency virus (HIV) infection status  Z21       Disposition:  Admitted to Med/Surg    Scribe Disclosure:  Zuly BARRERA, am serving as a scribe at 3:13 AM on 1/29/2019 to document services personally performed by Christian Geiger MD based on my observations and the provider's statements to me.    1/29/2019   [facility] EMERGENCY DEPARTMENT       Christian Geiger MD  01/29/19 0644

## 2019-01-29 NOTE — PROGRESS NOTES
Assessment and Plan:   ESRD: runs at [facility] [address]. Will run today to maintain schedule. Orders placed for 4 h, 2-3 L UF, 2K and 38 HCO3. Epo and hectorol on the run. Transfuse 1 U PRBC during dialysis.             Interval History:   LAF: S/P fistulogram with PTA 1/25/19.   Anemia: on high dose EPO. Transfusion today due to Hgb < 7.0.   HIV infection  DM  ASCVD  COPD                 Review of Systems:   Feeling well this am. No recent problems on dialysis.           Medications:       abacavir  600 mg Oral QPM     aspirin  81 mg Oral Daily     atorvastatin  40 mg Oral At Bedtime     carvedilol  12.5 mg Oral BID w/meals     chlorhexidine  15 mL Mouth/Throat BID     clopidogrel  75 mg Oral QPM     dapsone  100 mg Oral At Bedtime     darunavir  800 mg Oral At Bedtime     dolutegravir  50 mg Oral At Bedtime     gabapentin  300 mg Oral BID     guaiFENesin  1,200 mg Oral BID     hydrALAZINE  25 mg Oral TID     insulin aspart  1-10 Units Subcutaneous TID AC     insulin aspart  1-7 Units Subcutaneous At Bedtime     insulin aspart  15 Units Subcutaneous TID w/meals     insulin glargine  25 Units Subcutaneous BID     irbesartan  300 mg Oral At Bedtime     isosorbide mononitrate  120 mg Oral QPM     magnesium oxide  400 mg Oral At Bedtime     mirtazapine  15 mg Oral At Bedtime     multivitamin  1 capsule Oral At Bedtime     pantoprazole  40 mg Oral Daily     ritonavir  100 mg Oral At Bedtime     sodium chloride (PF)  3 mL Intracatheter Q8H     sucroferric oxyhydroxide  500 mg Oral TID w/meals         Current active medications and PTA medications reviewed, see medication list for details.            Physical Exam:   Vitals were reviewed  Patient Vitals for the past 24 hrs:   BP Temp Temp src Pulse Heart Rate Resp SpO2 Height Weight   01/29/19 0833 149/74 96.9  F (36.1  C) Oral 69 -- 20 95 % -- --   01/29/19 0820 -- -- -- -- -- -- -- -- 89.1 kg (196 lb 6.4 oz)   01/29/19 0800 154/78 -- -- 73 73 16  "100 % -- --   19 0700 168/84 -- -- 70 71 17 94 % -- --   19 0615 153/83 -- -- 73 71 27 96 % -- --   19 0600 152/81 -- -- 72 70 17 97 % -- --   19 0545 155/85 -- -- 75 74 17 95 % -- --   19 0530 168/80 -- -- 74 75 20 95 % -- --   19 0515 168/85 -- -- 73 76 15 95 % -- --   19 0500 169/83 -- -- 74 76 16 96 % -- --   19 0445 167/87 -- -- 75 74 16 94 % -- --   19 0430 157/82 -- -- 73 75 16 96 % -- --   19 0415 -- -- -- -- 71 17 -- -- --   19 0400 -- -- -- -- 76 18 -- -- --   19 0330 -- -- -- -- 75 26 97 % -- --   19 0315 -- -- -- -- 79 13 94 % -- --   19 0310 -- 97.5  F (36.4  C) Oral -- -- -- -- -- --   19 0303 (!) 185/96 -- -- 81 -- 20 94 % 1.803 m (5' 11\") 88 kg (194 lb 0.1 oz)       Temp:  [96.9  F (36.1  C)-97.5  F (36.4  C)] 96.9  F (36.1  C)  Pulse:  [69-81] 69  Heart Rate:  [70-79] 73  Resp:  [13-27] 20  BP: (149-185)/(74-96) 149/74  SpO2:  [94 %-100 %] 95 %    Temperatures:  Current - Temp: 96.9  F (36.1  C); Max - Temp  Av.2  F (36.2  C)  Min: 96.9  F (36.1  C)  Max: 97.5  F (36.4  C)  Respiration range: Resp  Av.2  Min: 13  Max: 27  Pulse range: Pulse  Av.5  Min: 69  Max: 81  Blood pressure range: Systolic (24hrs), Av , Min:149 , Max:185   ; Diastolic (24hrs), Av, Min:74, Max:96    Pulse oximetry range: SpO2  Av.6 %  Min: 94 %  Max: 100 %    No intake/output data recorded.      Intake/Output Summary (Last 24 hours) at 2019 1058  Last data filed at 2019 1000  Gross per 24 hour   Intake 160 ml   Output --   Net 160 ml       Alert, on NC O2  LAF with good thrill  LE no edema       Wt Readings from Last 4 Encounters:   19 89.1 kg (196 lb 6.4 oz)   19 85.5 kg (188 lb 7.9 oz)   18 90.5 kg (199 lb 8.3 oz)   18 86.9 kg (191 lb 8 oz)          Data:          Lab Results   Component Value Date     2019     2019     2019    Lab Results "   Component Value Date    CHLORIDE 106 01/29/2019    CHLORIDE 91 01/05/2019    CHLORIDE 96 01/04/2019    Lab Results   Component Value Date    BUN 97 01/29/2019     01/05/2019    BUN 60 01/04/2019      Lab Results   Component Value Date    POTASSIUM 5.6 01/29/2019    POTASSIUM 4.8 01/05/2019    POTASSIUM 4.4 01/04/2019    Lab Results   Component Value Date    CO2 20 01/29/2019    CO2 22 01/05/2019    CO2 23 01/04/2019    Lab Results   Component Value Date    CR 11.50 01/29/2019    CR 9.78 01/05/2019    CR 7.76 01/04/2019        Recent Labs   Lab Test 01/29/19  0316 01/05/19  0829 01/04/19  0600   WBC 3.9* 4.4 2.5*   HGB 6.7* 7.6* 7.4*   HCT 22.5* 24.2* 24.1*   * 95 95   * 101* 94*     Recent Labs   Lab Test 01/29/19  0316 01/03/19  1243 12/05/18  2140  07/06/14  2255   AST 18 31 15   < > 18   ALT 17 16 13   < > 17   ALKPHOS 138 107 139   < > 132   BILITOTAL 0.4 0.7 0.6   < > 0.5   BILICONJ  --   --   --   --  0.0    < > = values in this interval not displayed.       Recent Labs   Lab Test 09/01/18  1146 12/27/17  0720 10/10/17  2225   MAG 2.1 2.2 1.9     Recent Labs   Lab Test 12/11/18  0640 12/08/18  0702 09/08/18  0557   PHOS 6.1* 6.7* 6.2*     Recent Labs   Lab Test 01/29/19  0316 01/05/19  0829 01/04/19  0600   PRABHA 8.9 8.7 8.8       Lab Results   Component Value Date    PRABHA 8.9 01/29/2019     Lab Results   Component Value Date    WBC 3.9 (L) 01/29/2019    HGB 6.7 (LL) 01/29/2019    HCT 22.5 (L) 01/29/2019     (H) 01/29/2019     (L) 01/29/2019     Lab Results   Component Value Date     01/29/2019    POTASSIUM 5.6 (H) 01/29/2019    CHLORIDE 106 01/29/2019    CO2 20 01/29/2019     (H) 01/29/2019     Lab Results   Component Value Date    BUN 97 (H) 01/29/2019    CR 11.50 (H) 01/29/2019     Lab Results   Component Value Date    MAG 2.1 09/01/2018     Lab Results   Component Value Date    PHOS 6.1 (H) 12/11/2018       Creatinine   Date Value Ref Range Status   01/29/2019  11.50 (H) 0.66 - 1.25 mg/dL Final   01/05/2019 9.78 (H) 0.66 - 1.25 mg/dL Final   01/04/2019 7.76 (H) 0.66 - 1.25 mg/dL Final   01/03/2019 5.52 (H) 0.66 - 1.25 mg/dL Final   12/13/2018 8.89 (H) 0.66 - 1.25 mg/dL Final   12/11/2018 9.63 (H) 0.66 - 1.25 mg/dL Final       Attestation:  I have reviewed today's vital signs, notes, medications, labs and imaging.  Seen during dialysis.      Eric Bradford MD

## 2019-01-29 NOTE — PROGRESS NOTES
Chart reviewed and patient seen.  He presented with chest pain.  H/o chronic recurrent chest pain with multiple work ups- most recently with stress test one month ago (no ischemia).  Chest pain has resolved.  Troponins are flat. No further work up planned.  Dialysis this afternoon. Likely home tomorrow.

## 2019-01-29 NOTE — LETTER
Key Recommendations:  CTS following for elevated BNP, elevated MUSHTAQ score and readmission. Pt was admitted to [facility] from 1/3-1/6 [place] 801[address]. He followed up with his PCP and pulmonologist since that admission. He was readmitted [place] [address]. Met with pt at bedside to discuss plan of care, he is well known to CTS services. He states he is still living at home with his wife and his brother. His family is very supportive and has a daughter that is quite involved as well. He has [facility] for RN services. They do vital signs, assessment and assist with meds. He has had PT in the past and feels they are very helpful. He ambulates with a cane. He follows a strict diet and is well informed of what he can and cannot eat. He is on a dialysis, diabetic no salt diet and takes Nepro drinks twice a day. He is a dialysis pt at [facility] in [place] on TRS schedule. He states his daughter takes him at 545am and his brother picks him up around 1100. He does not anticipate any other discharge needs. Anticipate discharge today home with family and resumption of [facility] RN.        Karen Botello    AVS/Discharge Summary is the source of truth; this is a helpful guide for improved communication of patient story

## 2019-01-29 NOTE — ED NOTES
Bed: ED02  Expected date: 1/29/19  Expected time: 2:51 AM  Means of arrival: Ambulance  Comments:  Bud 5[address]- 68y, CP, dyspnea

## 2019-01-29 NOTE — LETTER
Transition Communication Hand-off for Care Transitions to Next Level of Care Provider    Name: Donald Raza  : 1950  MRN #: [id number]  Primary Care Provider: Aida Thomas  Primary Care MD Name: Aida Thomas  Primary Clinic: [facility] [address] [facility] [address]  Primary Care Clinic Name: [facility]   Reason for Hospitalization:  Asymptomatic human immunodeficiency virus (HIV) infection status (H) [Z21]  Hyperkalemia [E87.5]  Chronic anemia [D64.9]  ESRD on hemodialysis (H) [N18.6, Z99.2]  Elevated troponin I level [R74.8]  Chest pain, unspecified type [R07.9]  Admit Date/Time: 2019  3:02 AM  Discharge Date:   Payor Source: Payor: MEDICARE / Plan: MEDICARE / Product Type: Medicare /     Readmission Assessment Measure (MUSHTAQ) Risk Score/category: Elevated    Discharge Needs Assessment:  Needs      Most Recent Value   Home Care  [facility] [phone], Fax: [phone]        Follow-up plan:  No future appointments.    Any outstanding tests or procedures:        Referrals     Future Labs/Procedures    Home care nursing referral     Comments:    RN to resume home care services    Your provider has ordered home care nursing services. If you have not been contacted within 2 days of your discharge please call the inpatient department phone number at [phone] .    Home Care PT Referral for Hospital Discharge     Comments:    PT to eval and treat    Your provider has ordered home care - physical therapy. If you have not been contacted within 2 days of your discharge please call the department phone number listed on the top of this document.            Key Recommendations:  FYI of pt hospitalization:    CTS following for elevated BNP, elevated MUSHTAQ score and readmission. Pt was admitted to [facility] from 1/3- for resp failure [place] 20307. He followed up with his PCP and pulmonologist since that admission. He was readmitted with chest pain [place]  [place] 438[address]. Met with pt at bedside to discuss plan of care, he is well known to CTS services. No gaps in care identified. He was admitted under observation.  He does not anticipate any discharge needs. He will discharge today home with family and resumption of [facility] RN. He should follow up as scheduled. No new medications or med changes on discharge.        Karen Botello    AVS/Discharge Summary is the source of truth; this is a helpful guide for improved communication of patient story

## 2019-01-29 NOTE — PHARMACY-ADMISSION MEDICATION HISTORY
Admission medication history interview status for this patient is complete. See [facility] admission navigator for allergy information, prior to admission medications and immunization status.     Medication history interview source(s):Patient  Medication history resources (including written lists, pill bottles, clinic record):None  Primary pharmacy:      Changes made to PTA medication list:  Added:   Deleted:   Changed: gabapentin to bid (said doesn't do after dialysis any longer), Mucinex to scheduled bid    Actions taken by pharmacist (provider contacted, etc):sticky note to md    Additional medication history information:None    Medication reconciliation/reorder completed by provider prior to medication history? Yes    Do you take OTC medications (eg tylenol, ibuprofen, fish oil, eye/ear drops, etc)? Y(Y/N)    For patients on insulin therapy: Y (Y/N)  Lantus/levemir/NPH/Mix 70/30 dose:   (Y/N) (see Med list for doses) bid lantus  Sliding scale Novolog Y  If Yes, do you have a baseline novolog pre-meal dose:  units with meals  Yes  Patients eat three meals a day:   2-3  How many episodes of hypoglycemia do you have per week: _none recent but does occassionally__      Prior to Admission medications    Medication Sig Last Dose Taking? Auth Provider   abacavir (ZIAGEN) 300 MG tablet Take 600 mg by mouth every evening  1/28/2019 at Unknown time Yes Abstract, Provider   Acetaminophen (TYLENOL PO) Take 325 mg by mouth every 6 hours as needed for mild pain or fever  Past Month at Unknown time Yes Unknown, Entered By History   aspirin EC 81 MG EC tablet Take 1 tablet (81 mg) by mouth daily 1/28/2019 at Unknown time Yes Clemencia Pal MD   atorvastatin (LIPITOR) 40 MG tablet Take 40 mg by mouth At Bedtime 1/28/2019 at Unknown time Yes Unknown, Entered By History   B COMPLEX-C-FOLIC ACID PO Take 1 tablet by mouth At Bedtime  1/28/2019 at Unknown time Yes Reported, Patient   carvedilol (COREG) 12.5 MG tablet Take 1 tablet (12.5  mg) by mouth 2 times daily (with meals) 1/28/2019 at Unknown time Yes Clemencia Pal MD   chlorhexidine (CHLORHEXIDINE) 0.12 % solution Rinse and gargle 15 ml by mouth twice a day as directed. 1/28/2019 at Unknown time Yes Abstract, Provider   CLONAZEPAM PO Take 0.5 mg by mouth nightly as needed for anxiety (restless legs) Past Week at Unknown time Yes Unknown, Entered By History   CLOPIDOGREL BISULFATE PO Take 75 mg by mouth every evening  1/28/2019 at Unknown time Yes Unknown, Entered By History   dapsone 100 MG tablet Take 100 mg by mouth At Bedtime  1/28/2019 at Unknown time Yes Reported, Patient   Darunavir Ethanolate (PREZISTA PO) Take 800 mg by mouth At Bedtime. 1/28/2019 at Unknown time Yes Reported, Patient   dolutegravir (TIVICAY) 50 MG tablet Take 50 mg by mouth At Bedtime 1/28/2019 at Unknown time Yes Unknown, Entered By History   DOXERCALCIFEROL IV Inject 6 mcg into the vein three times a week (with dialysis) per dialysis Yes Reported, Patient   epoetin brittni (EPOGEN,PROCRIT) 47164 UNIT/ML injection Inject 11,000 Units Subcutaneous three times a week WITH DIALYSIS per dialysis Yes Unknown, Entered By History   gabapentin (NEURONTIN) 300 MG capsule Take 300 mg by mouth 2 times daily 1/28/2019 at Unknown time Yes Unknown, Entered By History   guaiFENesin (MUCINEX) 600 MG 12 hr tablet Take 1,200 mg by mouth 2 times daily 1/28/2019 at Unknown time Yes Unknown, Entered By History   hydrALAZINE (APRESOLINE) 25 MG tablet Take 1 tablet (25 mg) by mouth 3 times daily 1/28/2019 at Unknown time Yes Lucita Downing MD   imiquimod (ALDARA) 5 % cream Apply topically as needed 1/28/2019 at Unknown time Yes Reported, Patient   insulin glargine (LANTUS) 100 UNIT/ML injection Inject 25 Units Subcutaneous 2 times daily  1/28/2019 at Unknown time Yes Unknown, Entered By History   Insulin Lispro (HUMALOG PEN SC) Take 15 units TID and an additional 2 units for every 50 mg/dL if BG is over 150 1/28/2019 at Unknown time  "Yes Unknown, Entered By History   insulin lispro (HUMALOG PEN) 100 UNIT/ML pen Inject Subcutaneous 3 times daily (before meals) Sliding scale: takes 2 units for every 50 mg/dL over 150. 1/28/2019 at Unknown time Yes Unknown, Entered By History   ipratropium - albuterol 0.5 mg/2.5 mg/3 mL (DUONEB) 0.5-2.5 (3) MG/3ML neb solution Take 1 vial (3 mLs) by nebulization every 6 hours as needed for shortness of breath / dyspnea or wheezing 1/28/2019 at Unknown time Yes Catrachito Rosado,    irbesartan (AVAPRO) 300 MG tablet Take 1 tablet (300 mg) by mouth At Bedtime 1/28/2019 at Unknown time Yes Clemencia Pal MD   iron sucrose (VENOFER) 20 MG/ML injection Inject 50 mg into the vein once a week WIth dialysis per dialysis Yes Unknown, Entered By History   Isosorbide Mononitrate  MG TB24 Take 1 tablet (120 mg) by mouth every evening 1/28/2019 at Unknown time Yes Ykui Hinojosa APRN CNP   ketoconazole (NIZORAL) 2 % cream Apply topically 2 times daily as needed 1/28/2019 at Unknown time Yes Reported, Patient   MAGNESIUM OXIDE PO Take 400 mg by mouth At Bedtime 1/28/2019 at Unknown time Yes Unknown, Entered By History   mirtazapine (REMERON) 15 MG tablet Take 15 mg by mouth At Bedtime 1/28/2019 at Unknown time Yes Reported, Patient   nitroglycerin (NITROSTAT) 0.4 MG SL tablet One tablet under the tongue every 5 minutes if needed for chest pain. May repeat every 5 minutes for a maximum of 3 doses in 15 minutes\" 1/28/2019 at Unknown time Yes Lay Paz MD   Nutritional Supplements (NEPRO PO) 1-3 times daily 1/28/2019 at Unknown time Yes Unknown, Entered By History   omega-3 acid ethyl esters (LOVAZA) 1 G capsule Take 2 g by mouth 2 times daily 1/28/2019 at Unknown time Yes Unknown, Entered By History   pantoprazole (PROTONIX) 40 MG EC tablet Take 40 mg by mouth daily 1/28/2019 at Unknown time Yes Unknown, Entered By History   ritonavir (NORVIR) 100 MG capsule Take 1 capsule by mouth At Bedtime  1/28/2019 at " Unknown time Yes Reported, Patient   sucroferric oxyhydroxide (VELPHORO) 500 MG CHEW chewable tablet Take 500 mg by mouth 3 times daily (with meals) 1/28/2019 at Unknown time Yes Unknown, Entered By History   albuterol (PROAIR HFA/PROVENTIL HFA/VENTOLIN HFA) 108 (90 BASE) MCG/ACT Inhaler Inhale 2 puffs into the lungs every 6 hours as needed for shortness of breath / dyspnea or wheezing 1/27/2019  Nelson Worthy MD

## 2019-01-29 NOTE — ED TRIAGE NOTES
Presents via ambulance with complaints of sudden onset chest pain and shortness of breath.  Pain worsens with deep breath.  ABCD intact, alert and oriented x 4

## 2019-01-29 NOTE — ED NOTES
"[facility]  ED Nurse Handoff Report    Donald Raza is a 68 year old male   ED Chief complaint: Chest Pain and Shortness of Breath  . ED Diagnosis:   Final diagnoses:   Chest pain, unspecified type   Chronic anemia   ESRD on hemodialysis (H)   Hyperkalemia   Elevated troponin I level   Asymptomatic human immunodeficiency virus (HIV) infection status (H)     Allergies:   Allergies   Allergen Reactions     Calcium Acetate Other (See Comments)     Other reaction(s): Other, see comments  Pain in chest and back  Pain in chest area (sensitive skin)      Diagnostic X-Ray Materials Other (See Comments)     PN: renal failure     Lisinopril      Sulfa Drugs        Code Status: Full Code  Activity level - Baseline/Home:  Independent. Activity Level - Current:   Stand with Assist. Lift room needed: No. Bariatric: No   Needed: No   Isolation: No. Infection: Not Applicable.     Vital Signs:   Vitals:    01/29/19 0303 01/29/19 0310   BP: (!) 185/96    Pulse: 81    Resp: 20    Temp:  97.5  F (36.4  C)   TempSrc:  Oral   SpO2: 94%    Weight: 88 kg (194 lb 0.1 oz)    Height: 1.803 m (5' 11\")        Cardiac Rhythm:  ,   Cardiac  Cardiac Rhythm: Normal sinus rhythm  Pain level: 0-10 Pain Scale: 6  Patient confused: No. Patient Falls Risk: Yes.   Elimination Status: has not voided   Patient Report - Initial Complaint: chest pain, shortness of breath. Focused Assessment:  68 year old male with a history of COPD, heart history, dialysis, HIV who presents to the emergency department today with chest pain. Patient reports his pain started around 2100 or 2200 last night with associated shortness of breath. The pain is on the right side of the chest. He took 2 Nitroglycerin and an Aspirin at home. Patient having 98% on 4 Liters. Today would be his normal day for dialysis. He denies fever, new cough.      Admitted from 1/3/19 to 1/6/19 for respiratory issues. Last CD4 count was greater than 400. He got a blood " transfusion. Patient states he did not receive the blood transfusion. He also had diarrhea, shortness of breath treated with steroids.      Last echo was in September: showed an ejectin fracture of 60-65% woth early doiastolic disfuction. He had a nuclear medicine stress test in December that was unremarkable.      Allergies:  Calcium Acetate  Diagnostic X-Ray Materials  Lisinopril  Sulfa Drugs     Medications:    Abacavir (Ziagen) 300 Mg Tablet  Acetaminophen (Tylenol Po)  Albuterol (Proair Hfa/proventil Hfa/ventolin Hfa) 108 (90 Base) Mcg/act Inhaler  Aspirin Ec 81 Mg Ec Tablet  Atorvastatin (Lipitor) 40 Mg Tablet  B Complex-c-folic Acid Po  Carvedilol (Coreg) 12.5 Mg Tablet  Chlorhexidine (Chlorhexidine) 0.12 % Solution  Clonazepam Po  Clopidogrel Bisulfate Po  Dapsone 100 Mg Tablet  Darunavir Ethanolate (Prezista Po)  Dolutegravir (Tivicay) 50 Mg Tablet  Doxercalciferol Iv  Epoetin Dwayne (Epogen,procrit) 12775 Unit/ml Injection  Gabapentin (Neurontin) 300 Mg Capsule  Guaifenesin (Mucinex) 600 Mg 12 Hr Tablet  Hydralazine (Apresoline) 25 Mg Tablet  Imiquimod (Aldara) 5 % Cream  Insulin Glargine (Lantus) 100 Unit/ml Injection  Insulin Lispro (Humalog Pen Sc)  Insulin Lispro (Humalog Pen) 100 Unit/ml Pen  Ipratropium - Albuterol 0.5 Mg/2.5 Mg/3 Ml (Duoneb) 0.5-2.5 (3) Mg/3ml Neb Solution  Irbesartan (Avapro) 300 Mg Tablet  Iron Sucrose (Venofer) 20 Mg/ml Injection  Isosorbide Mononitrate Cr 120 Mg Tb24  Ketoconazole (Nizoral) 2 % Cream  Magnesium Oxide Po  Mirtazapine (Remeron) 15 Mg Tablet  Nitroglycerin (Nitrostat) 0.4 Mg Sl Tablet  Nutritional Supplements (Nepro Po)  Omega-3 Acid Ethyl Esters (Lovaza) 1 G Capsule  Pantoprazole (Protonix) 40 Mg Ec Tablet  Ritonavir (Norvir) 100 Mg Capsule  Sucroferric Oxyhydroxide (Velphoro) 500 Mg Chew Chewable Tablet     Past Medical History:    AIDS (acquired immune deficiency syndrome) (H)  Acute chest pain  Acute pulmonary edema (H)  Acute respiratory failure with hypoxia  (H)  Allergic rhinitis  Anemia  Anemia due to chronic kidney disease  Bilateral pneumonia  Huang disease - squamous cell carcinoma  Bradycardia  Bronchitis  CAD S/P percutaneous coronary angioplasty  CKD (chronic kidney disease)  COPD (chronic obstructive pulmonary disease) (H)  COPD exacerbation (H)  Cataract  Cerebral hypoperfusion, transient and thought primarily to low volume/BP assoc with dialysis  Colon cancer (H)  Coronary artery disease  Depression  Dialysis AV fistula malfunction (H)  Diarrhea  Dilated aortic root (H)  Diverticulitis  ESRD (end stage renal disease) on dialysis (H)  Expressive aphasia  Generalized muscle weakness  Generalized weakness  Gout  Human immunodeficiency virus (HIV) disease (H)  Hx of squamous cell carcinoma  Hypertension  Impotence of organic origin  Increased prostate specific antigen (PSA) velocity  Mixed hyperlipidemia  Mixed hyperlipidemia  Organ transplant candidate  Pneumonia  Pulmonary HTN (H)  Pulmonary hypertension (H)  Renal insufficiency  Respiratory failure (H)  Sepsis (H)  TIA (transient ischemic attack)  Type 2 diabetes mellitus  Unstable angina (H)  Unstable angina pectoris (H)     Past Surgical History:    Angiogram  Appendectomy  Cholscystectomy  Colon surgery  Colostomy  Left ear catheterization  Heart cath  Fistulogram  Coronary stent      Family History:    Heart disease  Kidney disease  Hypertension     Social History:  The patient was alone.   Smoking Status: Former  Smokeless Tobacco: Never  Alcohol Use: No   Marital Status:       Review of Systems   Constitutional: Negative for fever.   Respiratory: Positive for shortness of breath. Negative for cough.    Cardiovascular: Positive for chest pain (right side).   All other systems reviewed and are negative.         Tests Performed:   Labs Ordered and Resulted from Time of ED Arrival Up to the Time of Departure from the ED   CBC WITH PLATELETS DIFFERENTIAL - Abnormal; Notable for the following  components:       Result Value    WBC 3.9 (*)     RBC Count 2.22 (*)     Hemoglobin 6.7 (*)     Hematocrit 22.5 (*)      (*)     MCHC 29.8 (*)     RDW 16.4 (*)     Platelet Count 121 (*)     All other components within normal limits   COMPREHENSIVE METABOLIC PANEL - Abnormal; Notable for the following components:    Potassium 5.6 (*)     Anion Gap 15 (*)     Glucose 192 (*)     Urea Nitrogen 97 (*)     Creatinine 11.50 (*)     GFR Estimate 4 (*)     GFR Estimate If Black 5 (*)     All other components within normal limits   TROPONIN I - Abnormal; Notable for the following components:    Troponin I ES 0.048 (*)     All other components within normal limits   NT PROBNP INPATIENT - Abnormal; Notable for the following components:    N-Terminal Pro BNP Inpatient 30,633 (*)     All other components within normal limits   BLOOD GAS VENOUS   CARDIAC CONTINUOUS MONITORING   PULSE OXIMETRY NURSING   PERIPHERAL IV CATHETER   ABO/RH TYPE AND SCREEN     XR Chest 2 Views   Final Result   IMPRESSION:   1. Mildly increased interstitial opacities in the left lung are   nonspecific, but could relate to an infectious/inflammatory process or   asymmetric interstitial pulmonary edema.   2. No other findings suspicious for active cardiopulmonary disease.      EN KINSEY MD          . Abnormal Results: WBC 3.9,HGB 6.7,,K+5.6,BUN97,CR11.5,TROP0.048,BNP30,633.   Treatments provided: Medications - No data to display    Family Comments: No family present, lives with wife  OBS brochure/video discussed/provided to patient:  N/A  ED Medications: Medications - No data to display  Drips infusing:  No  For the majority of the shift, the patient's behavior Green. Interventions performed were NA.     Severe Sepsis OR Septic Shock Diagnosis Present: No      ED Nurse Name/Phone Number: TROY CRUZ,   4:23 AM    RECEIVING UNIT ED HANDOFF REVIEW    Above ED Nurse Handoff Report was reviewed: Yes  Reviewed by: Vandana Morrison on  January 29, 2019 at 7:59 AM

## 2019-01-29 NOTE — PROGRESS NOTES
Potassium   Date Value Ref Range Status   2019 5.6 (H) 3.4 - 5.3 mmol/L Final     Lab Results   Component Value Date    HGB 6.7 2019     Weight: 89.1 kg (196 lb 6.9 oz)  POST WT: 86.1 kg  DIALYSIS PROCEDURE NOTE  Hepatitis status of previous patient on machine log was checked and verified ok to use with this patients hepatitis status.  Patient dialyzed for 4 hrs. on a 2 K bath with a net fluid removal of  3L.  A BFR of 350 ml/min was obtained via a Upper LAVF using 15 gauge needles.    The patient was seen by Dr. Natarajan's before  treatment.  Total heparin received during the treatment: 1700 units. Sites held x 10 min then  pressure drsgs applied.    Meds  Given: Epogen, Heparin, Hectorol, and 1 Unit PRBC's. Complications: None .  Procedure and ESRD teaching done.   See flowsheet in EPIC for further details and post assessment.  Machine water alarm in place and functioning. Transducer pods intact and checked every 15min.  Pt returned via wheelchair  Vascular Access: Aseptic prep done for both on/off.  Report received from: Vandana Morrison RN   Report given to: Vandana Morrison RN   HEPATITIS B SURFACE ANTIGEN Negative () DATE 2018    HEPATITIS B SURFACE ANTIBODY unknown      HbAg and HbAb drawn on 2019    Chlorine/Chloramine water system checked every 4 hours.  Outpatient Dialysis at [facility], [facility], [place]

## 2019-01-29 NOTE — H&P
[facility]    History and Physical - Hospitalist Service       Date of Admission:  1/29/2019    Assessment & Plan   Donald Raza is a 68 year old male admitted on 1/29/2019. He has a past medical history significant for end-stage kidney disease, HIV, hyperlipidemia, coronary artery disease, and chronic anemia.  He presented to emergency room with chest discomfort.    1.  Chest discomfort.  Chronic.  Has been thoroughly worked up in the past.  Does have a chronic minimally detectable troponin.  Troponin level is not elevated today.  Had a cardiac stress test done 1 month ago.  Would not repeat stress test at this time.  Repeat one troponin level to ensure that it does not increase significantly.  Restart aspirin, carvedilol, and clopidogrel.    2.  End-stage kidney disease.  Nephrology consult.    3.  Chronic anemia.  Hemoglobin 6.7 today.  Nephrology consult.  Consideration for transfusion during dialysis.  Continue Epogen as regularly scheduled.    4.  Hypertension.  Restart home medications once verified by pharmacy.    5.  Diabetes mellitus.  NovoLog sliding scale.  Restart home medications once verified by pharmacy.    6.  HIV.  Restart home medications once verified by pharmacy.    7.  Deconditioning.  Sounds as though he is having trouble taking care of himself at home.  May need new living situation.  Social work consult.       Diet: Dialysis diet.  DVT Prophylaxis: Ambulate every shift  Christie Catheter: not present  Code Status: Full code    Disposition Plan   Expected discharge: Today  Entered: Marquez Tolliver DO 01/29/2019, 4:48 AM       Marquez Tolliver DO  [facility]    ______________________________________________________________________    Chief Complaint   Chest discomfort.    History is obtained from the patient    History of Present Illness   Donald Raza is a 68 year old male who has a past medical history significant for end-stage kidney disease, HIV,  hyperlipidemia, coronary artery disease, and chronic anemia.  He developed chest pain last evening at approximately 21:00.  Pain is described as feeling sore on both sides of his chest.  He points to his rib cage area on both sides of his chest.  Does feel a little short of breath with this.  Has not been coughing.  No fevers or chills.  Pain does not radiate anywhere.  He does have a problem with frequent chest pain in the past.  Pain does feel similar to pain he has had previously.  He does feel weak.  He is not sure that he has enough help at home.  No other complaints.  He did have his regularly scheduled dialysis on Saturday.    Review of Systems    The 10 point Review of Systems is negative other than noted in the HPI     Past Medical History    I have reviewed this patient's medical history and updated it with pertinent information if needed.   Past Medical History:   Diagnosis Date     Allergic rhinitis      Anemia due to chronic kidney disease 10/21/2011     CAD S/P percutaneous coronary angioplasty 6/15/2015     Cataract      CKD (chronic kidney disease)      Colon cancer (H)      COPD (chronic obstructive pulmonary disease) (H)      Depression      Dilated aortic root (H) 5/6/2016     Diverticulitis      ESRD (end stage renal disease) on dialysis (H) 5/6/2016     Gout      Human immunodeficiency virus (HIV) disease (H)      Hx of squamous cell carcinoma 03/23/2007     Hypertension 2010     Impotence of organic origin      Increased prostate specific antigen (PSA) velocity 08/08/2016    Awaiting bx on blood thinner     Mixed hyperlipidemia      Pulmonary HTN (H)     Mod     TIA (transient ischemic attack) 5/2016     Type 2 diabetes mellitus (H) age 50       Past Surgical History   I have reviewed this patient's surgical history and updated it with pertinent information if needed.  Past Surgical History:   Procedure Laterality Date     ANGIOGRAM  03-04-16    No culprit lesions, stents widely patent       ANGIOGRAM  16    Cutting balloon ptca=Diag     APPENDECTOMY       CHOLECYSTECTOMY, LAPOROSCOPIC       COLON SURGERY       COLOSTOMY  1999    Temporary for diverticulitis     HC LEFT HEART CATHETERIZATION  2018    No significant change  Elevated LVEDp  LVEF 30% No PCI     HEART CATH, ANGIOPLASTY  16    LAD PCI. Stented with a 3.0 x 8 mm Xience Alpine stent.     IR DIALYSIS FISTULOGRAM LEFT  2019     STENT, CORONARY, S660 15/18  2015    VANITA=Diag, PTCA=LAD     STENT, CORONARY, S660 15/18  2015    VANITA=LAD       Social History   I have reviewed this patient's social history and updated it with pertinent information if needed.  Social History     Tobacco Use     Smoking status: Former Smoker     Packs/day: 2.00     Years: 41.00     Pack years: 82.00     Types: Cigarettes     Last attempt to quit:      Years since quittin.0     Smokeless tobacco: Never Used   Substance Use Topics     Alcohol use: No     Alcohol/week: 0.0 oz     Drug use: No       Family History   I have reviewed this patient's family history and updated it with pertinent information if needed.   Family History   Problem Relation Age of Onset     Heart Disease Brother 40        CABG     Kidney Disease Sister      Hypertension Sister      Heart Disease Brother         Dilated aorta       Prior to Admission Medications   Prior to Admission Medications   Prescriptions Last Dose Informant Patient Reported? Taking?   Acetaminophen (TYLENOL PO)  Self Yes No   Sig: Take 325 mg by mouth every 6 hours as needed for mild pain or fever    B COMPLEX-C-FOLIC ACID PO  Self Yes No   Sig: Take 1 tablet by mouth At Bedtime    CLONAZEPAM PO  Self Yes No   Sig: Take 0.5 mg by mouth nightly as needed for anxiety (restless legs)   CLOPIDOGREL BISULFATE PO  Self Yes No   Sig: Take 75 mg by mouth every evening    DOXERCALCIFEROL IV  Self Yes No   Sig: Inject 6 mcg into the vein three times a week (with dialysis)   Darunavir  Ethanolate (PREZISTA PO)  Self Yes No   Sig: Take 800 mg by mouth At Bedtime.   Insulin Lispro (HUMALOG PEN SC)  Self Yes No   Sig: Take 15 units TID and an additional 2 units for every 50 mg/dL if BG is over 150   Isosorbide Mononitrate  MG TB24  Self No No   Sig: Take 1 tablet (120 mg) by mouth every evening   MAGNESIUM OXIDE PO  Self Yes No   Sig: Take 400 mg by mouth At Bedtime   Nutritional Supplements (NEPRO PO)  Self Yes No   Si-3 times daily   abacavir (ZIAGEN) 300 MG tablet  Self Yes No   Sig: Take 600 mg by mouth every evening    albuterol (PROAIR HFA/PROVENTIL HFA/VENTOLIN HFA) 108 (90 BASE) MCG/ACT Inhaler  Self No No   Sig: Inhale 2 puffs into the lungs every 6 hours as needed for shortness of breath / dyspnea or wheezing   aspirin EC 81 MG EC tablet  Self No No   Sig: Take 1 tablet (81 mg) by mouth daily   atorvastatin (LIPITOR) 40 MG tablet  Self Yes No   Sig: Take 40 mg by mouth At Bedtime   carvedilol (COREG) 12.5 MG tablet  Self No No   Sig: Take 1 tablet (12.5 mg) by mouth 2 times daily (with meals)   chlorhexidine (CHLORHEXIDINE) 0.12 % solution  Self Yes No   Sig: Rinse and gargle 15 ml by mouth twice a day as directed.   dapsone 100 MG tablet  Self Yes No   Sig: Take 100 mg by mouth At Bedtime    dolutegravir (TIVICAY) 50 MG tablet  Self Yes No   Sig: Take 50 mg by mouth At Bedtime   epoetin brittni (EPOGEN,PROCRIT) 76761 UNIT/ML injection  Self Yes No   Sig: Inject 11,000 Units Subcutaneous three times a week WITH DIALYSIS   gabapentin (NEURONTIN) 300 MG capsule  Self Yes No   Sig: Take 300 mg by mouth 2 times daily May take a third dose after dialysis.   guaiFENesin (MUCINEX) 600 MG 12 hr tablet  Self Yes No   Sig: Take by mouth as needed for congestion   hydrALAZINE (APRESOLINE) 25 MG tablet  Self Yes No   Sig: Take 1 tablet (25 mg) by mouth 3 times daily   imiquimod (ALDARA) 5 % cream  Self Yes No   Sig: Apply topically as needed   insulin glargine (LANTUS) 100 UNIT/ML injection   "Self Yes No   Sig: Inject 25 Units Subcutaneous 2 times daily    insulin lispro (HUMALOG PEN) 100 UNIT/ML pen  Self Yes No   Sig: Inject Subcutaneous 3 times daily (before meals) Sliding scale: takes 2 units for every 50 mg/dL over 150.   ipratropium - albuterol 0.5 mg/2.5 mg/3 mL (DUONEB) 0.5-2.5 (3) MG/3ML neb solution  Self No No   Sig: Take 1 vial (3 mLs) by nebulization every 6 hours as needed for shortness of breath / dyspnea or wheezing   irbesartan (AVAPRO) 300 MG tablet  Self No No   Sig: Take 1 tablet (300 mg) by mouth At Bedtime   iron sucrose (VENOFER) 20 MG/ML injection  Self Yes No   Sig: Inject 50 mg into the vein once a week WIth dialysis   ketoconazole (NIZORAL) 2 % cream  Self Yes No   Sig: Apply topically 2 times daily as needed   mirtazapine (REMERON) 15 MG tablet  Self Yes No   Sig: Take 15 mg by mouth At Bedtime   nitroglycerin (NITROSTAT) 0.4 MG SL tablet  Self No No   Sig: One tablet under the tongue every 5 minutes if needed for chest pain. May repeat every 5 minutes for a maximum of 3 doses in 15 minutes\"   omega-3 acid ethyl esters (LOVAZA) 1 G capsule  Self Yes No   Sig: Take 2 g by mouth 2 times daily   pantoprazole (PROTONIX) 40 MG EC tablet  Self Yes No   Sig: Take 40 mg by mouth daily   ritonavir (NORVIR) 100 MG capsule  Self Yes No   Sig: Take 1 capsule by mouth At Bedtime    sucroferric oxyhydroxide (VELPHORO) 500 MG CHEW chewable tablet  Self Yes No   Sig: Take 500 mg by mouth 3 times daily (with meals)      Facility-Administered Medications: None     Allergies   Allergies   Allergen Reactions     Calcium Acetate Other (See Comments)     Other reaction(s): Other, see comments  Pain in chest and back  Pain in chest area (sensitive skin)      Diagnostic X-Ray Materials Other (See Comments)     PN: renal failure     Lisinopril      Sulfa Drugs        Physical Exam   Vital Signs: Temp: 97.5  F (36.4  C) Temp src: Oral BP: (!) 185/96 Pulse: 81 Heart Rate: 71 Resp: 17 SpO2: 97 % O2 " Device: None (Room air)    Weight: 194 lbs .08 oz    Gen:  NAD, A&Ox3.  Eyes:  PERRL, sclera anicteric.  OP:  MMM, no lesions.  Neck:  Supple.  CV:  Regular, no murmurs.  Lung: A few scattered crackles in lower fields b/l, normal effort.  Ab:  +BS, soft.  Skin:  Warm, dry to touch.  No rash.  Ext:  No pitting edema LE b/l.      Data   Data reviewed today: I reviewed all medications, new labs and imaging results over the last 24 hours. I personally reviewed the EKG tracing showing Sinus rhythm.  No acute ischemia..    Recent Labs   Lab 01/29/19  0316   WBC 3.9*   HGB 6.7*   *   *      POTASSIUM 5.6*   CHLORIDE 106   CO2 20   BUN 97*   CR 11.50*   ANIONGAP 15*   PRABHA 8.9   *   ALBUMIN 3.5   PROTTOTAL 7.3   BILITOTAL 0.4   ALKPHOS 138   ALT 17   AST 18   TROPI 0.048*

## 2019-01-30 VITALS
BODY MASS INDEX: 27.5 KG/M2 | RESPIRATION RATE: 20 BRPM | DIASTOLIC BLOOD PRESSURE: 67 MMHG | WEIGHT: 196.43 LBS | TEMPERATURE: 96 F | SYSTOLIC BLOOD PRESSURE: 179 MMHG | HEART RATE: 90 BPM | HEIGHT: 71 IN | OXYGEN SATURATION: 94 %

## 2019-01-30 LAB
GLUCOSE BLDC GLUCOMTR-MCNC: 102 MG/DL (ref 70–99)
GLUCOSE BLDC GLUCOMTR-MCNC: 162 MG/DL (ref 70–99)
GLUCOSE BLDC GLUCOMTR-MCNC: 60 MG/DL (ref 70–99)
GLUCOSE BLDC GLUCOMTR-MCNC: 82 MG/DL (ref 70–99)
HBV SURFACE AB SERPL IA-ACNC: 68.11 M[IU]/ML
HBV SURFACE AG SERPL QL IA: NONREACTIVE
LACTATE BLD-SCNC: 1.5 MMOL/L (ref 0.7–2)

## 2019-01-30 PROCEDURE — 99217 ZZC OBSERVATION CARE DISCHARGE: CPT | Performed by: INTERNAL MEDICINE

## 2019-01-30 PROCEDURE — 25000131 ZZH RX MED GY IP 250 OP 636 PS 637: Mod: GY | Performed by: INTERNAL MEDICINE

## 2019-01-30 PROCEDURE — 25000132 ZZH RX MED GY IP 250 OP 250 PS 637: Mod: GY | Performed by: INTERNAL MEDICINE

## 2019-01-30 PROCEDURE — 83605 ASSAY OF LACTIC ACID: CPT | Performed by: INTERNAL MEDICINE

## 2019-01-30 PROCEDURE — 00000146 ZZHCL STATISTIC GLUCOSE BY METER IP

## 2019-01-30 PROCEDURE — G0378 HOSPITAL OBSERVATION PER HR: HCPCS

## 2019-01-30 PROCEDURE — A9270 NON-COVERED ITEM OR SERVICE: HCPCS | Mod: GY | Performed by: INTERNAL MEDICINE

## 2019-01-30 PROCEDURE — 96372 THER/PROPH/DIAG INJ SC/IM: CPT

## 2019-01-30 PROCEDURE — 99207 ZZC CDG-CODE CATEGORY CHANGED: CPT | Performed by: INTERNAL MEDICINE

## 2019-01-30 PROCEDURE — 36415 COLL VENOUS BLD VENIPUNCTURE: CPT | Performed by: INTERNAL MEDICINE

## 2019-01-30 RX ORDER — HEPARIN SODIUM 1000 [USP'U]/ML
500 INJECTION, SOLUTION INTRAVENOUS; SUBCUTANEOUS CONTINUOUS
Status: CANCELLED | OUTPATIENT
Start: 2019-01-30

## 2019-01-30 RX ORDER — HEPARIN SODIUM 1000 [USP'U]/ML
500 INJECTION, SOLUTION INTRAVENOUS; SUBCUTANEOUS
Status: CANCELLED | OUTPATIENT
Start: 2019-01-30 | End: 2019-01-30

## 2019-01-30 RX ORDER — DOXERCALCIFEROL 4 UG/2ML
4 INJECTION INTRAVENOUS
Status: CANCELLED | OUTPATIENT
Start: 2019-01-30 | End: 2019-01-30

## 2019-01-30 RX ADMIN — ASPIRIN 81 MG: 81 TABLET, COATED ORAL at 08:08

## 2019-01-30 RX ADMIN — CHLORHEXIDINE GLUCONATE 15 ML: 1.2 RINSE ORAL at 08:08

## 2019-01-30 RX ADMIN — HYDRALAZINE HYDROCHLORIDE 25 MG: 25 TABLET ORAL at 08:08

## 2019-01-30 RX ADMIN — GUAIFENESIN 1200 MG: 600 TABLET, EXTENDED RELEASE ORAL at 08:08

## 2019-01-30 RX ADMIN — CARVEDILOL 12.5 MG: 12.5 TABLET, FILM COATED ORAL at 08:08

## 2019-01-30 RX ADMIN — INSULIN ASPART 15 UNITS: 100 INJECTION, SOLUTION INTRAVENOUS; SUBCUTANEOUS at 09:38

## 2019-01-30 RX ADMIN — PANTOPRAZOLE SODIUM 40 MG: 40 TABLET, DELAYED RELEASE ORAL at 08:08

## 2019-01-30 RX ADMIN — INSULIN GLARGINE 25 UNITS: 100 INJECTION, SOLUTION SUBCUTANEOUS at 09:36

## 2019-01-30 RX ADMIN — GABAPENTIN 300 MG: 300 CAPSULE ORAL at 08:08

## 2019-01-30 NOTE — PLAN OF CARE
PRIMARY DIAGNOSIS: CHEST PAIN  OUTPATIENT/OBSERVATION GOALS TO BE MET BEFORE DISCHARGE:  1. Ruled out acute coronary syndrome (negative or stable Troponin):  Yes  2. Pain Status: Pain free.  3. Appropriate provocative testing performed: N/A  - Stress Test Procedure: None   - Interpretation of cardiac rhythm per telemetry tech: NSR  4. Cleared by Consultants (if applicable):N/A  5. Return to near baseline physical activity: No  Discharge Planner Nurse   Safe discharge environment identified: Yes  Barriers to discharge: Yes       Entered by:Whitney Valdez 01/30/2019 6:04 AM    Please review provider order for any additional goals.   Nurse to notify provider when observation goals have been met and patient is ready for discharge.  Patient is weak. Ambulating with assist of 1- 2, gait belt and cane. Denies pain. Blood pressure elevated, pt trigger lactic protocol. Lactic WDL. Patient alert/oriented x4. Continue to observe.

## 2019-01-30 NOTE — CONSULTS
Care Transition Initial Assessment - RN        Met with: Patient.  DATA   Active Problems:    Dialysis patient (H)       Cognitive Status: alert and oriented.  Primary Care Clinic Name: [facility]   Primary Care MD Name: Aida Thomas  Contact information and PCP information verified: Yes  Lives With: spouse, sibling(s)(brother)      Quality of Family Relationships: supportive, involved  Description of Support System: Supportive, Involved       Support Assessment: Adequate family and caregiver support   Insurance concerns: No Insurance issues identified  ASSESSMENT  Patient currently receives the following services:  CTS following for elevated BNP, elevated MUSHTAQ score and readmission. Pt was admitted to [facility] from 1/3-1/6 [place] resp [place] 87[address]. He followed up with his PCP and pulmonologist since that admission. He was readmitted with chest pain [place]NP [place] 612[address]. Met with pt at bedside to discuss plan of care, he is well known to [facility] services. He states he is still living at home with his wife and his brother. His family is very supportive and has a daughter that is quite involved as well. He has [facility] for RN services. They do vital signs, assessment and assist with meds. He has had PT in the past and feels they are very helpful. He ambulates with a cane. He follows a strict diet and is well informed of what he can and cannot eat. He is on a dialysis, diabetic no salt diet and takes Nepro drinks twice a day. He is a dialysis pt at [facility] in [place] on TRS schedule. He states his daughter takes him at 545am and his brother picks him up around 1100. He does not anticipate any other discharge needs. Anticipate discharge today home with family and resumption of [facility] RN.    PLAN  Patient/family is agreeable to the plan?  Yes  Patient anticipates discharging to home with family and resumption of [facility] .  Resources List: Home Care     Patient anticipates needs for home equipment:  No  Plan/Disposition: Home     Appointments: [facility] [place] with Dr Owen on Mon 2/4 at 3:30pm    Care  (CTS) will continue to follow as needed. Handoff will be sent to cts for pcp on discharge.    Karen Botello RN CTS  Care Transitions  [phone]

## 2019-01-30 NOTE — PLAN OF CARE
PRIMARY DIAGNOSIS: CHEST PAIN  OUTPATIENT/OBSERVATION GOALS TO BE MET BEFORE DISCHARGE:  1. Ruled out acute coronary syndrome (negative or stable Troponin):  Yes  2. Pain Status: Pain free.  3. Appropriate provocative testing performed: N/A  - Stress Test Procedure: None   - Interpretation of cardiac rhythm per telemetry tech: NSR    4. Cleared by Consultants (if applicable):N/A  5. Return to near baseline physical activity: No  Discharge Planner Nurse   Safe discharge environment identified: Yes  Barriers to discharge: Yes       Entered by: Sammy Valladares 01/29/2019 8:04 PM     Please review provider order for any additional goals.   Nurse to notify provider when observation goals have been met and patient is ready for discharge.  Patient is weak. Ambulating with assist of 2, gait belt and cane. Complaints of bilateral LE pain, treated with Tylenol. BM this shift, black in color. Patient states this is normal due to medications. Blood pressure elevated, scheduled medications administered. Patient alert/oriented x4. Continue to observe.

## 2019-01-30 NOTE — DISCHARGE SUMMARY
"[facility]    Discharge Summary  Hospitalist    Date of Admission:  1/29/2019  Date of Discharge:  1/30/2019  Discharging Provider: Dot Ann MD  Date of Service (when I saw the patient): 01/30/19    Discharge Diagnoses   Chest pain  Coronary artery disease with history of PCI  End-stage renal disease on TThS hemodialysis  Anemia of chronic disease  Hypertension  Diabetes mellitus, type 2  HIV  Deconditioning      History of Present Illness   Donald Raza is a 68 year old male who was admitted on 1/29/2019. PMH is significant for ESRD on TThS HD, HIV, chronic anemia requiring intermittent blood transfusions, coronary artery disease with prior PCI, history of TIA, and hyperlipidemia. Patient presented to emergency department for evaluation of chest pain with associated shortness of breath. Patient was admitted for further evaluation and treatment.     Hospital Course   Donald Raza was admitted on 1/29/2019.  The following problems were addressed during his hospitalization:    Chest pain  Coronary artery disease with history of PCI  Note that patient had Lindy stress scan 12/12/18 which demonstrated no significant areas of ischemia and no significant changes compared to 2017 study.   He did also have cardiac cath 3/2018 that noted previously placed stents widely patent. Did note \"50% ostial diagonal stenosis and 50% stenosis in the lateral branch of bifurcating diagonal. 50-70% stenosis in the small caliber medial branch.\" Continued aggressive medical management and risk factor modification was recommended at that time.   With ESRD, patient has a chronic minimally elevated troponin.    Patient did take additional aspirin and nitroglycerin at home with minimal improvement.   Troponin level was only slightly above upper limit normal during this admission at 0.048 - 0.049 and remained flat. This is lower than it has been in recent past.   Patient's Hgb was found to be decreased at 6.7 and " patient was transfused 1 unit(s) PRBCs with HD.   No ongoing chest pain.  No further cardiac evaluation required at this time. Would continue aggressive medical management. Will not repeat Hgb this morning and will allow that to be followed tomorrow with HD.  Continue PTA aspirin, carvedilol, atorvastatin, irbesartan, imdur, and plavix. PRN nitroglycerin has not been required.   Continue PTA medications on discharge.   Follow up with primary care within 1 week.     End-stage renal disease on TThS hemodialysis  Appreciated nephrology following.   Patient underwent HD on 1/29. Patient received epo with run and was transfused 1 unit(s) PRBC during dialysis.  Continue outpatient HD scheduled.  Continued PTA medications per nephrology.     Anemia of chronic disease  Hemoglobin 6.7 on admission. Patient receives intermittent transfusions, as well as epogen with nephrology.  Patient received transfusion of 1 unit(s) PRBCs on 1/29. Given symptom improvement and chronicity of problem, did not recheck post-transfusion Hgb. Patient can have Hgb checked with HD tomorrow as normal.  Nephrology to continue following and transfusing as needed.    Hypertension  Continue PTA coreg and irbesartan.    Diabetes mellitus, type 2  PTA lantus 25 unit(s) BID, humalog 15 unit(s) TID with meals, and humalog sliding scale insulin with 2 unit(s) for every 50 mg/dL over 150.  Note that patient had glucose of 60 this morning at 4 AM. Other sugars running in 80s-120s. Given these lower values, decreasing lantus to 20 unit(s) BID and decreasing humalog to 12 unit(s) TID with meals. Not changing sliding scale insulin for discharge.   Patient to continue recording blood sugars and take log to PCP in next week.     HIV  Continue PTA HAART.     Deconditioning  Inpatient PT evaluation was offered, but patient declined. Stated he is fine at home. Patient is able to ambulate and transfer without assistance. Uses cane. Agreed to home PT evaluation and  treatment.       Dot Ann MD FACP  Hospitalist Service  [facility]      Significant Results and Procedures   1/29/19 Transfusion 1 unit(s) PRBCs  1/29/19 Hemodialysis    Pending Results   None    Code Status   Full Code       Primary Care Physician   Aida Thomas    Physical Exam   Temp: 96  F (35.6  C) Temp src: Oral BP: 166/79 Pulse: 90 Heart Rate: 72 Resp: 20 SpO2: 96 % O2 Device: None (Room air)    Vitals:    01/29/19 0303 01/29/19 0820 01/29/19 1310   Weight: 88 kg (194 lb 0.1 oz) 89.1 kg (196 lb 6.4 oz) 89.1 kg (196 lb 6.9 oz)     Vital Signs with Ranges  Temp:  [95.8  F (35.4  C)-98.2  F (36.8  C)] 96  F (35.6  C)  Pulse:  [73-90] 90  Heart Rate:  [67-86] 72  Resp:  [20-28] 20  BP: (147-188)/(62-89) 166/79  SpO2:  [93 %-99 %] 96 %  I/O last 3 completed shifts:  In: 160 [P.O.:160]  Out: 3850 [Urine:50; Other:3800]    Constitutional: Alert and oriented x3. No acute distress. Patient was seen sitting up in chair after eating lunch. Non-toxic. Pleasant.  HEENT: NCAT. EOMI. Moist oral mucosa.  Respiratory: Clear to auscultation bilaterally. No crackles or wheezes.  Cardiovascular: Regular rate and rhythm. No murmur.  GI: Soft, nontender, nondistended.    Musculoskeletal: No gross deformities. No peripheral edema.  Neurologic: Alert and oriented x3. No focal neurologic deficits.       Discharge Disposition   Discharged to home  Condition at discharge: Stable    Consultations This Hospital Stay   NEPHROLOGY IP CONSULT  SOCIAL WORK IP CONSULT  CARE COORDINATOR IP CONSULT    Time Spent on this Encounter   IDot, personally saw the patient today and spent greater than 30 minutes discharging this patient.    Discharge Orders      Home care nursing referral      Home Care PT Referral for Hospital Discharge      Reason for your hospital stay    Chest discomfort, weakness     Follow-up and recommended labs and tests     Follow up with primary care provider, Aida Thomas,  within 7 days to evaluate medication change and for hospital follow- up.  The following labs/tests are recommended: CBC, BMP. Follow blood sugars and review decreased insulin doses.     Monitor and record    blood glucose 4 times a day, before meals and at bedtime     MD face to face encounter    Documentation of Face to Face and Certification for Home Health Services    I certify that patient: Donald Raza is under my care and that I, or a nurse practitioner or physician's assistant working with me, had a face-to-face encounter that meets the physician face-to-face encounter requirements with this patient on: 1/30/2019.    This encounter with the patient was in whole, or in part, for the following medical condition, which is the primary reason for home health care: end stage renal disease, anemia, diabetes.    I certify that, based on my findings, the following services are medically necessary home health services: Nursing and Physical Therapy.    My clinical findings support the need for the above services because: Nurse is needed: to continue home services.    Further, I certify that my clinical findings support that this patient is homebound (i.e. absences from home require considerable and taxing effort and are for medical reasons or Mu-ism services or infrequently or of short duration when for other reasons) because: Leaving home is medically contraindicated for the following reason(s): Infection risk / immunocompromised state where it is safer for them to receive services in the home...    Based on the above findings. I certify that this patient is confined to the home and needs intermittent skilled nursing care, physical therapy and/or speech therapy.  The patient is under my care, and I have initiated the establishment of the plan of care.  This patient will be followed by a physician who will periodically review the plan of care.  Physician/Provider to provide follow up care: Aida Thomas  Lay    Attending hospital physician (the Medicare certified PECOS provider): Dto Ann  Physician Signature: See electronic signature associated with these discharge orders.  Date: 1/30/2019     Activity    Your activity upon discharge: activity as tolerated     Full Code     Diet    Follow this diet upon discharge: Renal dialysis diet with carb counting     Discharge Medications   Current Discharge Medication List      CONTINUE these medications which have CHANGED    Details   insulin glargine (LANTUS SOLOSTAR PEN) 100 UNIT/ML pen Inject 20 Units Subcutaneous 2 times daily  Qty: 12 mL, Refills: 0      insulin lispro (HUMALOG PEN) 100 UNIT/ML pen Inject 12 Units Subcutaneous 3 times daily (before meals) And an additional 2 units for every 50 mg/dL over 150.  Qty: 10.8 mL, Refills: 0         CONTINUE these medications which have NOT CHANGED    Details   abacavir (ZIAGEN) 300 MG tablet Take 600 mg by mouth every evening       Acetaminophen (TYLENOL PO) Take 325 mg by mouth every 6 hours as needed for mild pain or fever       aspirin EC 81 MG EC tablet Take 1 tablet (81 mg) by mouth daily  Qty: 30 tablet, Refills: 0    Associated Diagnoses: Coronary artery disease, angina presence unspecified, unspecified vessel or lesion type, unspecified whether native or transplanted heart      atorvastatin (LIPITOR) 40 MG tablet Take 40 mg by mouth At Bedtime      B COMPLEX-C-FOLIC ACID PO Take 1 tablet by mouth At Bedtime       carvedilol (COREG) 12.5 MG tablet Take 1 tablet (12.5 mg) by mouth 2 times daily (with meals)  Qty: 60 tablet, Refills: 0    Associated Diagnoses: Hypertension secondary to other renal disorders      chlorhexidine (CHLORHEXIDINE) 0.12 % solution Rinse and gargle 15 ml by mouth twice a day as directed.      CLONAZEPAM PO Take 0.5 mg by mouth nightly as needed for anxiety (restless legs)      CLOPIDOGREL BISULFATE PO Take 75 mg by mouth every evening       dapsone 100 MG tablet Take 100 mg by mouth At  Bedtime       Darunavir Ethanolate (PREZISTA PO) Take 800 mg by mouth At Bedtime.      dolutegravir (TIVICAY) 50 MG tablet Take 50 mg by mouth At Bedtime      DOXERCALCIFEROL IV Inject 6 mcg into the vein three times a week (with dialysis)      epoetin brittni (EPOGEN,PROCRIT) 66152 UNIT/ML injection Inject 11,000 Units Subcutaneous three times a week WITH DIALYSIS      gabapentin (NEURONTIN) 300 MG capsule Take 300 mg by mouth 2 times daily      guaiFENesin (MUCINEX) 600 MG 12 hr tablet Take 1,200 mg by mouth 2 times daily      hydrALAZINE (APRESOLINE) 25 MG tablet Take 1 tablet (25 mg) by mouth 3 times daily  Qty: 90 tablet, Refills: 0    Comments: Future refills by PCP Dr. Aida Thomas with phone number [phone].  Associated Diagnoses: Hypertension secondary to other renal disorders      imiquimod (ALDARA) 5 % cream Apply topically as needed      ipratropium - albuterol 0.5 mg/2.5 mg/3 mL (DUONEB) 0.5-2.5 (3) MG/3ML neb solution Take 1 vial (3 mLs) by nebulization every 6 hours as needed for shortness of breath / dyspnea or wheezing  Qty: 90 vial, Refills: 1    Associated Diagnoses: Acute respiratory failure with hypoxia (H)      irbesartan (AVAPRO) 300 MG tablet Take 1 tablet (300 mg) by mouth At Bedtime  Qty: 30 tablet, Refills: 0    Associated Diagnoses: Hypertension secondary to other renal disorders      iron sucrose (VENOFER) 20 MG/ML injection Inject 50 mg into the vein once a week WIth dialysis      Isosorbide Mononitrate  MG TB24 Take 1 tablet (120 mg) by mouth every evening  Qty: 30 tablet, Refills: 11    Associated Diagnoses: Coronary artery disease involving native heart, angina presence unspecified, unspecified vessel or lesion type; Hypertension secondary to other renal disorders      ketoconazole (NIZORAL) 2 % cream Apply topically 2 times daily as needed      MAGNESIUM OXIDE PO Take 400 mg by mouth At Bedtime      mirtazapine (REMERON) 15 MG tablet Take 15 mg by mouth At Bedtime     "  nitroglycerin (NITROSTAT) 0.4 MG SL tablet One tablet under the tongue every 5 minutes if needed for chest pain. May repeat every 5 minutes for a maximum of 3 doses in 15 minutes\"  Qty: 25 tablet, Refills: 3    Associated Diagnoses: Coronary artery disease due to lipid rich plaque; Status post coronary angioplasty      Nutritional Supplements (NEPRO PO) 1-3 times daily      omega-3 acid ethyl esters (LOVAZA) 1 G capsule Take 2 g by mouth 2 times daily      pantoprazole (PROTONIX) 40 MG EC tablet Take 40 mg by mouth daily      ritonavir (NORVIR) 100 MG capsule Take 1 capsule by mouth At Bedtime       sucroferric oxyhydroxide (VELPHORO) 500 MG CHEW chewable tablet Take 500 mg by mouth 3 times daily (with meals)      albuterol (PROAIR HFA/PROVENTIL HFA/VENTOLIN HFA) 108 (90 BASE) MCG/ACT Inhaler Inhale 2 puffs into the lungs every 6 hours as needed for shortness of breath / dyspnea or wheezing  Qty: 1 Inhaler, Refills: 1    Associated Diagnoses: Cough           Allergies   Allergies   Allergen Reactions     Calcium Acetate Other (See Comments)     Other reaction(s): Other, see comments  Pain in chest and back  Pain in chest area (sensitive skin)      Diagnostic X-Ray Materials Other (See Comments)     PN: renal failure     Lisinopril      Sulfa Drugs      Data   Most Recent 3 CBC's:  Recent Labs   Lab Test 01/29/19 0316 01/05/19  0829 01/04/19  0600   WBC 3.9* 4.4 2.5*   HGB 6.7* 7.6* 7.4*   * 95 95   * 101* 94*      Most Recent 3 BMP's:  Recent Labs   Lab Test 01/29/19 0316 01/05/19  0829 01/04/19  0600    127* 132*   POTASSIUM 5.6* 4.8 4.4   CHLORIDE 106 91* 96   CO2 20 22 23   BUN 97* 103* 60*   CR 11.50* 9.78* 7.76*   ANIONGAP 15* 14 13   PRABHA 8.9 8.7 8.8   * 313* 179*     Most Recent 2 LFT's:  Recent Labs   Lab Test 01/29/19 0316 01/03/19  1243   AST 18 31   ALT 17 16   ALKPHOS 138 107   BILITOTAL 0.4 0.7       Most Recent 3 Troponin's:  Recent Labs   Lab Test 01/29/19  1123 " 01/29/19  0316 01/04/19  1200  03/08/18  0542  12/25/17  0856  04/15/17  0820   TROPI 0.048* 0.048* 0.049*   < > 0.043   < >  --    < >  --    TROPONIN  --   --   --   --  0.02  --  0.02  --  0.02    < > = values in this interval not displayed.       Results for orders placed or performed during the hospital encounter of 01/29/19   XR Chest 2 Views    Narrative    CHEST 2 VIEWS  1/29/2019 3:41 AM     HISTORY: Shortness of breath. Chest pain.    COMPARISON: 1/3/2019.    FINDINGS: Mildly increased interstitial opacities within the left  lung. Probable cardiomegaly. Atherosclerotic calcification in the  thoracic aorta.      Impression    IMPRESSION:  1. Mildly increased interstitial opacities in the left lung are  nonspecific, but could relate to an infectious/inflammatory process or  asymmetric interstitial pulmonary edema.  2. No other findings suspicious for active cardiopulmonary disease.    EN KINSEY MD

## 2019-01-30 NOTE — PLAN OF CARE
Plan to discharge home with family this afternoon. Up with sba and cane. Tolerating RA. Dialysis fistula to LUE. Outpt regularly scheduled dialysis tomorrow, agapito updated and informed pt will be discharging and attending his upcoming apt.

## 2019-01-30 NOTE — PROGRESS NOTES
Transition Communication Hand-off for Care Transitions to Next Level of Care Provider    Name: Donald Raza  : 1950  MRN #: [id number]  Primary Care Provider: Aida Thomas  Primary Care MD Name: Aida Thomas  Primary Clinic: [facility] [address] [facility] [address]  Primary Care Clinic Name: [facility]   Reason for Hospitalization:  Asymptomatic human immunodeficiency virus (HIV) infection status (H) [Z21]  Hyperkalemia [E87.5]  Chronic anemia [D64.9]  ESRD on hemodialysis (H) [N18.6, Z99.2]  Elevated troponin I level [R74.8]  Chest pain, unspecified type [R07.9]  Admit Date/Time: 2019  3:02 AM  Discharge Date:   Payor Source: Payor: MEDICARE / Plan: MEDICARE / Product Type: Medicare /     Readmission Assessment Measure (MUSHTAQ) Risk Score/category: Elevated    Discharge Needs Assessment:  Needs      Most Recent Value   Home Care  [facility] [phone], Fax: [phone]        Follow-up plan:  No future appointments.    Any outstanding tests or procedures:        Referrals     Future Labs/Procedures    Home care nursing referral     Comments:    RN to resume home care services    Your provider has ordered home care nursing services. If you have not been contacted within 2 days of your discharge please call the inpatient department phone number at [phone] .    Home Care PT Referral for Hospital Discharge     Comments:    PT to eval and treat    Your provider has ordered home care - physical therapy. If you have not been contacted within 2 days of your discharge please call the department phone number listed on the top of this document.            Key Recommendations:  FYI of pt hospitalization:    CTS following for elevated BNP, elevated MUSHTAQ score and readmission. Pt was admitted to [facility] from 1/3- for resp [place] [address]13[address]. He followed up with his PCP and pulmonologist since that admission. He was readmitted with chest pain [place]  [place] 69[address]. Met with pt at bedside to discuss plan of care, he is well known to CTS services. No gaps in care identified. He was admitted under observation.  He does not anticipate any discharge needs. He will discharge today home with family and resumption of [facility] RN. He should follow up as scheduled. No new medications or med changes on discharge.        Karen Botello    AVS/Discharge Summary is the source of truth; this is a helpful guide for improved communication of patient story

## 2019-01-30 NOTE — PLAN OF CARE
VSS, A/OX4, discharge paperwork reviewed with pt and brother, pt verbalized understanding, pt discharge home via brothers personal vehicle.

## 2019-01-31 LAB — GLUCOSE BLDC GLUCOMTR-MCNC: 129 MG/DL (ref 70–99)

## 2019-02-11 ENCOUNTER — APPOINTMENT (OUTPATIENT)
Dept: GENERAL RADIOLOGY | Facility: CLINIC | Age: 69
End: 2019-02-11
Attending: EMERGENCY MEDICINE
Payer: MEDICARE

## 2019-02-11 ENCOUNTER — HOSPITAL ENCOUNTER (EMERGENCY)
Facility: CLINIC | Age: 69
Discharge: ANOTHER HOSPITAL | End: 2019-02-11
Attending: EMERGENCY MEDICINE | Admitting: EMERGENCY MEDICINE
Payer: MEDICARE

## 2019-02-11 VITALS
SYSTOLIC BLOOD PRESSURE: 172 MMHG | HEART RATE: 88 BPM | TEMPERATURE: 100 F | DIASTOLIC BLOOD PRESSURE: 78 MMHG | OXYGEN SATURATION: 96 % | RESPIRATION RATE: 24 BRPM

## 2019-02-11 DIAGNOSIS — J44.1 COPD EXACERBATION (H): ICD-10-CM

## 2019-02-11 DIAGNOSIS — N18.6 ESRD (END STAGE RENAL DISEASE) ON DIALYSIS (H): ICD-10-CM

## 2019-02-11 DIAGNOSIS — Z99.2 ESRD (END STAGE RENAL DISEASE) ON DIALYSIS (H): ICD-10-CM

## 2019-02-11 DIAGNOSIS — R06.03 RESPIRATORY DISTRESS: ICD-10-CM

## 2019-02-11 DIAGNOSIS — R50.9 FEBRILE ILLNESS: ICD-10-CM

## 2019-02-11 DIAGNOSIS — E87.5 HYPERKALEMIA: ICD-10-CM

## 2019-02-11 LAB
ALBUMIN SERPL-MCNC: 4 G/DL (ref 3.4–5)
ALP SERPL-CCNC: 125 U/L (ref 40–150)
ALT SERPL W P-5'-P-CCNC: 16 U/L (ref 0–70)
ANION GAP SERPL CALCULATED.3IONS-SCNC: 14 MMOL/L (ref 3–14)
AST SERPL W P-5'-P-CCNC: 23 U/L (ref 0–45)
BASOPHILS # BLD AUTO: 0 10E9/L (ref 0–0.2)
BASOPHILS NFR BLD AUTO: 0.3 %
BILIRUB SERPL-MCNC: 0.5 MG/DL (ref 0.2–1.3)
BUN SERPL-MCNC: 92 MG/DL (ref 7–30)
CALCIUM SERPL-MCNC: 9.6 MG/DL (ref 8.5–10.1)
CHLORIDE SERPL-SCNC: 101 MMOL/L (ref 94–109)
CO2 BLDCOV-SCNC: 22 MMOL/L (ref 21–28)
CO2 SERPL-SCNC: 21 MMOL/L (ref 20–32)
CREAT SERPL-MCNC: 9.87 MG/DL (ref 0.66–1.25)
DIFFERENTIAL METHOD BLD: ABNORMAL
EOSINOPHIL # BLD AUTO: 0 10E9/L (ref 0–0.7)
EOSINOPHIL NFR BLD AUTO: 0.2 %
ERYTHROCYTE [DISTWIDTH] IN BLOOD BY AUTOMATED COUNT: 15.7 % (ref 10–15)
FLUAV+FLUBV AG SPEC QL: NEGATIVE
FLUAV+FLUBV AG SPEC QL: NEGATIVE
GFR SERPL CREATININE-BSD FRML MDRD: 5 ML/MIN/{1.73_M2}
GLUCOSE BLDC GLUCOMTR-MCNC: 200 MG/DL (ref 70–99)
GLUCOSE SERPL-MCNC: 110 MG/DL (ref 70–99)
HCT VFR BLD AUTO: 23.2 % (ref 40–53)
HGB BLD-MCNC: 7.1 G/DL (ref 13.3–17.7)
IMM GRANULOCYTES # BLD: 0 10E9/L (ref 0–0.4)
IMM GRANULOCYTES NFR BLD: 0.3 %
LACTATE BLD-SCNC: 1 MMOL/L (ref 0.7–2.1)
LYMPHOCYTES # BLD AUTO: 0.8 10E9/L (ref 0.8–5.3)
LYMPHOCYTES NFR BLD AUTO: 7.8 %
MCH RBC QN AUTO: 30.2 PG (ref 26.5–33)
MCHC RBC AUTO-ENTMCNC: 30.6 G/DL (ref 31.5–36.5)
MCV RBC AUTO: 99 FL (ref 78–100)
MONOCYTES # BLD AUTO: 0.5 10E9/L (ref 0–1.3)
MONOCYTES NFR BLD AUTO: 5.1 %
NEUTROPHILS # BLD AUTO: 8.2 10E9/L (ref 1.6–8.3)
NEUTROPHILS NFR BLD AUTO: 86.3 %
NRBC # BLD AUTO: 0 10*3/UL
NRBC BLD AUTO-RTO: 0 /100
NT-PROBNP SERPL-MCNC: ABNORMAL PG/ML (ref 0–900)
PCO2 BLDV: 39 MM HG (ref 40–50)
PH BLDV: 7.36 PH (ref 7.32–7.43)
PLATELET # BLD AUTO: 139 10E9/L (ref 150–450)
PO2 BLDV: 33 MM HG (ref 25–47)
POTASSIUM SERPL-SCNC: 7.1 MMOL/L (ref 3.4–5.3)
PROT SERPL-MCNC: 8.3 G/DL (ref 6.8–8.8)
RBC # BLD AUTO: 2.35 10E12/L (ref 4.4–5.9)
SAO2 % BLDV FROM PO2: 60 %
SODIUM SERPL-SCNC: 136 MMOL/L (ref 133–144)
SPECIMEN SOURCE: NORMAL
TROPONIN I SERPL-MCNC: 0.02 UG/L (ref 0–0.04)
WBC # BLD AUTO: 9.6 10E9/L (ref 4–11)

## 2019-02-11 PROCEDURE — 83605 ASSAY OF LACTIC ACID: CPT

## 2019-02-11 PROCEDURE — 96375 TX/PRO/DX INJ NEW DRUG ADDON: CPT

## 2019-02-11 PROCEDURE — 94640 AIRWAY INHALATION TREATMENT: CPT

## 2019-02-11 PROCEDURE — 84484 ASSAY OF TROPONIN QUANT: CPT | Performed by: EMERGENCY MEDICINE

## 2019-02-11 PROCEDURE — 94644 CONT INHLJ TX 1ST HOUR: CPT

## 2019-02-11 PROCEDURE — 87804 INFLUENZA ASSAY W/OPTIC: CPT | Mod: 91 | Performed by: EMERGENCY MEDICINE

## 2019-02-11 PROCEDURE — 25000128 H RX IP 250 OP 636: Performed by: EMERGENCY MEDICINE

## 2019-02-11 PROCEDURE — 00000146 ZZHCL STATISTIC GLUCOSE BY METER IP

## 2019-02-11 PROCEDURE — 80053 COMPREHEN METABOLIC PANEL: CPT | Performed by: EMERGENCY MEDICINE

## 2019-02-11 PROCEDURE — 99285 EMERGENCY DEPT VISIT HI MDM: CPT | Mod: 25

## 2019-02-11 PROCEDURE — 85025 COMPLETE CBC W/AUTO DIFF WBC: CPT | Performed by: EMERGENCY MEDICINE

## 2019-02-11 PROCEDURE — 87040 BLOOD CULTURE FOR BACTERIA: CPT | Performed by: EMERGENCY MEDICINE

## 2019-02-11 PROCEDURE — 83880 ASSAY OF NATRIURETIC PEPTIDE: CPT | Performed by: EMERGENCY MEDICINE

## 2019-02-11 PROCEDURE — 71045 X-RAY EXAM CHEST 1 VIEW: CPT

## 2019-02-11 PROCEDURE — 93005 ELECTROCARDIOGRAM TRACING: CPT

## 2019-02-11 PROCEDURE — 40000275 ZZH STATISTIC RCP TIME EA 10 MIN

## 2019-02-11 PROCEDURE — 82803 BLOOD GASES ANY COMBINATION: CPT

## 2019-02-11 PROCEDURE — 25800030 ZZH RX IP 258 OP 636: Performed by: EMERGENCY MEDICINE

## 2019-02-11 PROCEDURE — 25000132 ZZH RX MED GY IP 250 OP 250 PS 637: Mod: GY | Performed by: EMERGENCY MEDICINE

## 2019-02-11 PROCEDURE — 25000125 ZZHC RX 250: Performed by: EMERGENCY MEDICINE

## 2019-02-11 PROCEDURE — 25800030 ZZH RX IP 258 OP 636

## 2019-02-11 PROCEDURE — 36415 COLL VENOUS BLD VENIPUNCTURE: CPT | Performed by: EMERGENCY MEDICINE

## 2019-02-11 PROCEDURE — 25800025 ZZH RX 258: Performed by: EMERGENCY MEDICINE

## 2019-02-11 PROCEDURE — A9270 NON-COVERED ITEM OR SERVICE: HCPCS | Mod: GY | Performed by: EMERGENCY MEDICINE

## 2019-02-11 PROCEDURE — 96365 THER/PROPH/DIAG IV INF INIT: CPT

## 2019-02-11 PROCEDURE — 25000128 H RX IP 250 OP 636

## 2019-02-11 RX ORDER — DEXTROSE MONOHYDRATE 25 G/50ML
25 INJECTION, SOLUTION INTRAVENOUS ONCE
Status: COMPLETED | OUTPATIENT
Start: 2019-02-11 | End: 2019-02-11

## 2019-02-11 RX ORDER — ALBUTEROL SULFATE 0.83 MG/ML
15 SOLUTION RESPIRATORY (INHALATION) ONCE
Status: DISCONTINUED | OUTPATIENT
Start: 2019-02-11 | End: 2019-02-11

## 2019-02-11 RX ORDER — ALBUTEROL SULFATE 5 MG/ML
10 SOLUTION, NON-ORAL INHALATION ONCE
Status: COMPLETED | OUTPATIENT
Start: 2019-02-11 | End: 2019-02-11

## 2019-02-11 RX ORDER — SODIUM POLYSTYRENE SULFONATE 15 G/60ML
30 SUSPENSION ORAL; RECTAL ONCE
Status: COMPLETED | OUTPATIENT
Start: 2019-02-11 | End: 2019-02-11

## 2019-02-11 RX ORDER — CALCIUM GLUCONATE 94 MG/ML
2 INJECTION, SOLUTION INTRAVENOUS ONCE
Status: DISCONTINUED | OUTPATIENT
Start: 2019-02-11 | End: 2019-02-11

## 2019-02-11 RX ORDER — IPRATROPIUM BROMIDE AND ALBUTEROL SULFATE 2.5; .5 MG/3ML; MG/3ML
6 SOLUTION RESPIRATORY (INHALATION) ONCE
Status: COMPLETED | OUTPATIENT
Start: 2019-02-11 | End: 2019-02-11

## 2019-02-11 RX ORDER — METHYLPREDNISOLONE SODIUM SUCCINATE 125 MG/2ML
125 INJECTION, POWDER, LYOPHILIZED, FOR SOLUTION INTRAMUSCULAR; INTRAVENOUS ONCE
Status: COMPLETED | OUTPATIENT
Start: 2019-02-11 | End: 2019-02-11

## 2019-02-11 RX ORDER — CEFAZOLIN SODIUM 1 G/50ML
2000 SOLUTION INTRAVENOUS ONCE
Status: COMPLETED | OUTPATIENT
Start: 2019-02-11 | End: 2019-02-11

## 2019-02-11 RX ADMIN — METHYLPREDNISOLONE SODIUM SUCCINATE 125 MG: 125 INJECTION, POWDER, FOR SOLUTION INTRAMUSCULAR; INTRAVENOUS at 20:10

## 2019-02-11 RX ADMIN — HUMAN INSULIN 10 UNITS: 100 INJECTION, SOLUTION SUBCUTANEOUS at 22:25

## 2019-02-11 RX ADMIN — SODIUM POLYSTYRENE SULFONATE 30 G: 15 SUSPENSION ORAL; RECTAL at 23:02

## 2019-02-11 RX ADMIN — SODIUM BICARBONATE 50 MEQ: 84 INJECTION, SOLUTION INTRAVENOUS at 22:26

## 2019-02-11 RX ADMIN — IPRATROPIUM BROMIDE AND ALBUTEROL SULFATE 6 ML: .5; 3 SOLUTION RESPIRATORY (INHALATION) at 19:49

## 2019-02-11 RX ADMIN — DEXTROSE MONOHYDRATE 25 G: 500 INJECTION PARENTERAL at 22:19

## 2019-02-11 RX ADMIN — VANCOMYCIN HYDROCHLORIDE 2000 MG: 1 INJECTION, POWDER, LYOPHILIZED, FOR SOLUTION INTRAVENOUS at 21:18

## 2019-02-11 RX ADMIN — CALCIUM GLUCONATE 2 G: 98 INJECTION, SOLUTION INTRAVENOUS at 22:34

## 2019-02-11 RX ADMIN — TAZOBACTAM SODIUM AND PIPERACILLIN SODIUM 2.25 G: 250; 2 INJECTION, SOLUTION INTRAVENOUS at 20:44

## 2019-02-11 RX ADMIN — ALBUTEROL SULFATE 10 MG: 5 SOLUTION RESPIRATORY (INHALATION) at 21:22

## 2019-02-11 ASSESSMENT — ENCOUNTER SYMPTOMS
FEVER: 1
FATIGUE: 1
SHORTNESS OF BREATH: 1
RHINORRHEA: 0

## 2019-02-12 LAB — INTERPRETATION ECG - MUSE: NORMAL

## 2019-02-12 NOTE — ED TRIAGE NOTES
Pt is on dialysis, last dialysis was on Saturday. Started feeling malaise all day, fever noted by family upto 101 with SOB at rest and upon exertion. Also reported chest pain with EMS, nitro administered x1. A&Ox3. Airway patent, work of breathing increased, circulation intact.

## 2019-02-12 NOTE — ED NOTES
[facility]  ED Nurse Handoff Report    Donald Raza is a 68 year old male   ED Chief complaint: Chest Pain; Fever; and Shortness of Breath  . ED Diagnosis:   Final diagnoses:   None     Allergies:   Allergies   Allergen Reactions     Calcium Acetate Other (See Comments)     Other reaction(s): Other, see comments  Pain in chest and back  Pain in chest area (sensitive skin)      Diagnostic X-Ray Materials Other (See Comments)     PN: renal failure     Lisinopril      Sulfa Drugs        Code Status: Full Code  Activity level - Baseline/Home:  Independent. Activity Level - Current:   Stand with Assist. Lift room needed: No. Bariatric: No   Needed: No   Isolation: No. Infection: Not Applicable.     Vital Signs:   Vitals:    02/11/19 2013 02/11/19 2015 02/11/19 2016 02/11/19 2030   BP:  157/72  157/72   Pulse:  69  67   Resp: 24  22 24   Temp:       TempSrc:       SpO2:  98%  96%       Cardiac Rhythm:  ,      Pain level:    Patient confused: No. Patient Falls Risk: Yes.   Elimination Status: Unable to void, dialysis pt. Makes only small amount of urine at baseline.  Patient Report - Initial Complaint: Chest pain, shortness of breath. Focused Assessment: NSR, productive cough, increased work of breathing.   Tests Performed:   Labs Ordered and Resulted from Time of ED Arrival Up to the Time of Departure from the ED   CBC WITH PLATELETS DIFFERENTIAL - Abnormal; Notable for the following components:       Result Value    RBC Count 2.35 (*)     Hemoglobin 7.1 (*)     Hematocrit 23.2 (*)     MCHC 30.6 (*)     RDW 15.7 (*)     Platelet Count 139 (*)     All other components within normal limits   COMPREHENSIVE METABOLIC PANEL - Abnormal; Notable for the following components:    Potassium 7.1 (*)     Glucose 110 (*)     Urea Nitrogen 92 (*)     Creatinine 9.87 (*)     GFR Estimate 5 (*)     GFR Estimate If Black 5 (*)     All other components within normal limits   NT PROBNP INPATIENT - Abnormal;  Notable for the following components:    N-Terminal Pro BNP Inpatient 22,975 (*)     All other components within normal limits   ISTAT  GASES LACTATE FEMI POCT - Abnormal; Notable for the following components:    PCO2 Venous 39 (*)     All other components within normal limits   TROPONIN I   ROUTINE UA WITH MICROSCOPIC   ISTAT CG4 GASES LACTATE FEMI NURSING POCT   INFLUENZA A/B ANTIGEN   BLOOD CULTURE   BLOOD CULTURE     XR Chest Port 1 View   Preliminary Result   IMPRESSION: No acute abnormality.        Treatments provided: See MAR  Family Comments: Daughter at bedside  OBS brochure/video discussed/provided to patient:  N/A  ED Medications:   Medications   piperacillin-tazobactam (ZOSYN) infusion 2.25 g (2.25 g Intravenous New Bag 2/11/19 2044)   vancomycin (VANCOCIN) 2,000 mg in sodium chloride 0.9 % 500 mL intermittent infusion (not administered)   ipratropium - albuterol 0.5 mg/2.5 mg/3 mL (DUONEB) neb solution 6 mL (6 mLs Nebulization Given 2/11/19 1949)   methylPREDNISolone sodium succinate (solu-MEDROL) injection 125 mg (125 mg Intravenous Given 2/11/19 2010)     Drips infusing:  Yes  For the majority of the shift, the patient's behavior Green. Interventions performed were monitor.     Severe Sepsis OR Septic Shock Diagnosis Present: No      ED Nurse Name/Phone Number: Drake Govea,   8:49 PM

## 2019-02-12 NOTE — ED PROVIDER NOTES
History     Chief Complaint:  Chest Pain; Fever; and Shortness of Breath      HPI   Donald Raza is a 68 year old male with a history of Diabetes, CAD, and CKD on dialysis who presents with chest pain, shortness of breath and fever. The patient states that he typically has dialysis Tuesdays, Thursdays and Saturdays with his last run two days prior (Saturday). He states that this week, they wanted the patient to have an extra run. Today, he felt generally weak all day along with a fever up to 101 at home. He also felt short of breath that worsened with exertion as well as laying flat. The patient states that at 1700 he had an episode of chest pain described as a sharp and non-radiating. EMS did give the patient nitroglycerin which did improve his pain. He denies any active chest pain. He denies any rashes, URI symptoms.    Allergies:  Calcium Acetate   Lisinopril   Sulfa Drugs      Medications:    Abacavir  Albuterol   Aspirin   Lipitor  Coreg  Clonazepam   Clopidogrel bisulfate   Dapsone   Darunavir  Dolutegravin   Doxercalciferol   Epoetin brittni  Gabapentin   Lantus solostar  Humalog  Duoneb   Avapro  Venofer  Isosorbide mononitrate   Magnesium oxide   Remeron   Nitroglycerin   Lovaza  Protonix   Norvir  Velphoro     Past Medical History:    CAD  CKD  Colon Cancer   COPD  Depression   Dilated aortic Root  Diverticulitis   ESRD  HIV  Hypertension   Hyperlipidemia   Pulmonary HTN  TIA   Diabetes     Past Surgical History:    Appendectomy   Cholecystectomy   Colostomy     Family History:    Heart Disease-Brother  Kidney Disease-Sister  Hypertension-Siser    Social History:  Marital Status:   Presents to the ED via EMS  Tobacco Use: Former Smoker  Alcohol Use: No  PCP: Aida Thomas      Review of Systems   Constitutional: Positive for fatigue and fever.   HENT: Negative for rhinorrhea.    Respiratory: Positive for shortness of breath.    Cardiovascular: Positive for chest pain.   Skin: Negative for  rash.   All other systems reviewed and are negative.    Physical Exam   First Vitals:  BP: 183/83  Pulse: 72  Heart Rate: 72  Temp: 100  F (37.8  C)  Resp: 24  SpO2: 92 %      Physical Exam    General:   Pleasant, age appropriate male who appears dyspneic  HEENT:    Oropharynx is moist, without lesions or trismus.  Eyes:    Conjunctiva normal, PERRL  Neck:    Supple, no meningismus.     CV:     Regular rate and rhythm.      No murmurs, rubs or gallops.       No unilateral leg swelling.       2+ radial pulses bilateral.       No lower extremity edema.     Fistula to LUE  PULM:    Moderate diffuse expiratory wheezing.       Mild respiratory distress.      Diminished air exchange.     Speaks in full sentences.     No rales or rhonci.     No stridor.  ABD:    Soft, non-tender, non-distended.       No rebound, guarding or rigidity.  MSK:     No gross deformity to all four extremities.   LYMPH:   No cervical lymphadenopathy.  NEURO:   Alert and oriented x 3.      No tremor.  Skin:    Warm, dry and intact.    Psych:    Mood is good and affect is appropriate.          Emergency Department Course   ECG:  @ 1943  Indication: Chest Pain  Vent. Rate 82 bpm. IL interval 294 ms. QRS duration 102 ms. QT/QTc 352/411 ms. P-R-T axis 26 35 103.   Sinus rhythm with 1st degree AV block. ST and T wave abnormality, consider lateral ischemia.  Abnormal ECG.  No significant change when compared to previous ECG from 1/29/19   Read @ 1945 by Dr. Seaman.     Imaging:  Chest x-ray portable, 1 view:   No acute abnormality.   Preliminary radiology read.     Radiographic findings were communicated with the patient who voiced understanding of the findings.    Laboratory:  Influenza A/B: Negative    CBC:  WBC 9.6, HGB 7.1 (L),  (L)  CMP: Potassium 7.1 (HH), Glucose 110 (H), BUN 92 (H), Creatinine 9.87 (H), GFR 5 (L), otherwise WNL    (2032) Troponin I: 0.021  BNP: 47531 (H)   (1953) ISTAT Blood gas venous and oxyhgb: pH 7.36, PCO2 39 (L),  PO2 33, Bicarbonate 22, O2 sat 60,  Lactic Acid 1.0.      Blood Culture: pending x 2     Interventions:  () Albuterol-Ipratropium inhalation solution, 2.5 mg-0.5 mg/3 ml; 6 mL, inhalation   () Solu-Medrol, 125 mg, IV  () Zosyn, 2.25 g, IV   () Vancomycin, 2000 mg, IV   () Albuterol, 10 mg, inhalation    Emergency Department Course:  Nursing notes and vitals reviewed.  () I performed an exam of the patient as documented above.  EKG was done, interpretation as above.   A peripheral IV was established. Blood was drawn from the patient. This was sent for laboratory testing, findings above.    The patient had a portable chest x-ray done in the emergency department.   () Wife told patient s daughter that the patient had a fever last night unknown what temperature. I reviewed the portable x-ray  () I consulted with Dr. Lui of Nephrology regarding the patient.   Findings and plan explained to the patient. Patient will be transferred to [place] via EMS.  () I discussed the case with Dr. Murillo, who will admit the patient to a monitored bed for further monitoring, evaluation, and treatment.     Impression & Plan      Medical Decision Makin-year-old male presented to the ED with generalized weakness, malaise and fever of 101.0 at home.  Patient presented in mild respiratory distress with signs of acute bronchospasm and hypoxia.  In regards to his respiratory symptoms, he was treated for COPD exacerbation with bronchodilators and steroids with improvement of symptoms.  Chest x-ray reveals no evidence of pneumonia.  Radiology read CXR as no significant pulmonary edema although I feel there is mild pulmonary edema which is a minor contributor to his respiratory symptoms.  VBG is reassuring with no acute CO2 retention.  He was evaluated for coronary equivalent in which EKG reveals no ischemic changes above baseline and troponin within normal limits.    Patient also had a fever 101.0.   He has no evidence of pneumonia, soft tissue infection.  Patient makes minimal urine and unable to obtain urine sample at this time.  Blood cultures sent and pending.  Given his high risk for bacteremia, patient was given broad-spectrum antibiotics with Zosyn and vancomycin.  Lactate within normal limits.    Patient was also noted to have hyperkalemia.  EKG does reveal peaked T waves but no QRS widening or dysrhythmia.  Patient was given acute reversal agents in the ED.  Initially the plan was to admit the patient to [facility]. I spoke with the nephrologist on-call who felt comfortable with medical management with pending dialysis in the morning.  Unfortunately there are no beds available at [facility] or the [facility].  Patient requested transferred to [place].  Patient was graciously accepted by hospital medicine service and will be transferred via EMS.        Diagnosis:    ICD-10-CM    1. Respiratory distress R06.03 Blood culture     Blood culture     Glucose by meter     Glucose by meter   2. COPD exacerbation (H) J44.1    3. ESRD (end stage renal disease) on dialysis (H) N18.6     Z99.2    4. Febrile illness R50.9    5. Hyperkalemia E87.5        Disposition:  Transfer to [facility]        I, Sonja Rivera, am serving as a scribe on 2/11/2019 at 7:40 PM to personally document services performed by Dr. Seaman based on my observations and the provider's statements to me.    2/11/2019   [facility] EMERGENCY DEPARTMENT       Al Seaman MD  02/11/19 9430

## 2019-03-01 ENCOUNTER — HOSPITAL ENCOUNTER (EMERGENCY)
Facility: CLINIC | Age: 69
Discharge: HOME | End: 2019-03-01
Attending: EMERGENCY MEDICINE | Admitting: EMERGENCY MEDICINE
Payer: MEDICARE

## 2019-03-01 ENCOUNTER — APPOINTMENT (OUTPATIENT)
Dept: GENERAL RADIOLOGY | Facility: CLINIC | Age: 69
End: 2019-03-01
Attending: EMERGENCY MEDICINE
Payer: MEDICARE

## 2019-03-01 VITALS
RESPIRATION RATE: 18 BRPM | SYSTOLIC BLOOD PRESSURE: 154 MMHG | TEMPERATURE: 98.6 F | OXYGEN SATURATION: 95 % | DIASTOLIC BLOOD PRESSURE: 76 MMHG | HEART RATE: 68 BPM

## 2019-03-01 DIAGNOSIS — N18.6 ANEMIA DUE TO CHRONIC KIDNEY DISEASE, ON CHRONIC DIALYSIS (H): ICD-10-CM

## 2019-03-01 DIAGNOSIS — N18.6 ESRD (END STAGE RENAL DISEASE) ON DIALYSIS (H): ICD-10-CM

## 2019-03-01 DIAGNOSIS — Z99.2 ANEMIA DUE TO CHRONIC KIDNEY DISEASE, ON CHRONIC DIALYSIS (H): ICD-10-CM

## 2019-03-01 DIAGNOSIS — D63.1 ANEMIA DUE TO CHRONIC KIDNEY DISEASE, ON CHRONIC DIALYSIS (H): ICD-10-CM

## 2019-03-01 DIAGNOSIS — J40 BRONCHITIS: ICD-10-CM

## 2019-03-01 DIAGNOSIS — Z99.2 ESRD (END STAGE RENAL DISEASE) ON DIALYSIS (H): ICD-10-CM

## 2019-03-01 LAB
ABO + RH BLD: NORMAL
ABO + RH BLD: NORMAL
ANION GAP SERPL CALCULATED.3IONS-SCNC: 11 MMOL/L (ref 6–17)
BASOPHILS # BLD AUTO: 0 10E9/L (ref 0–0.2)
BASOPHILS NFR BLD AUTO: 0.8 %
BLD GP AB SCN SERPL QL: NORMAL
BLD PROD TYP BPU: NORMAL
BLD PROD TYP BPU: NORMAL
BLD UNIT ID BPU: 0
BLOOD BANK CMNT PATIENT-IMP: NORMAL
BLOOD PRODUCT CODE: NORMAL
BPU ID: NORMAL
BUN SERPL-MCNC: 53 MG/DL (ref 7–30)
CA-I BLD-SCNC: 4.8 MG/DL (ref 4.4–5.2)
CHLORIDE BLD-SCNC: 98 MMOL/L (ref 94–109)
CO2 BLD-SCNC: 25 MMOL/L (ref 20–32)
CREAT BLD-MCNC: 7.5 MG/DL (ref 0.66–1.25)
DIFFERENTIAL METHOD BLD: ABNORMAL
EOSINOPHIL # BLD AUTO: 0.1 10E9/L (ref 0–0.7)
EOSINOPHIL NFR BLD AUTO: 1.5 %
ERYTHROCYTE [DISTWIDTH] IN BLOOD BY AUTOMATED COUNT: 16.5 % (ref 10–15)
GFR SERPL CREATININE-BSD FRML MDRD: 7 ML/MIN/{1.73_M2}
GLUCOSE BLD-MCNC: 114 MG/DL (ref 70–99)
HCT VFR BLD AUTO: 21.9 % (ref 40–53)
HCT VFR BLD CALC: 21 %PCV (ref 40–53)
HGB BLD CALC-MCNC: 7.1 G/DL (ref 13.3–17.7)
HGB BLD-MCNC: 6.7 G/DL (ref 13.3–17.7)
IMM GRANULOCYTES # BLD: 0 10E9/L (ref 0–0.4)
IMM GRANULOCYTES NFR BLD: 0.6 %
LYMPHOCYTES # BLD AUTO: 1 10E9/L (ref 0.8–5.3)
LYMPHOCYTES NFR BLD AUTO: 19.4 %
MCH RBC QN AUTO: 29.1 PG (ref 26.5–33)
MCHC RBC AUTO-ENTMCNC: 30.6 G/DL (ref 31.5–36.5)
MCV RBC AUTO: 95 FL (ref 78–100)
MONOCYTES # BLD AUTO: 0.5 10E9/L (ref 0–1.3)
MONOCYTES NFR BLD AUTO: 8.8 %
NEUTROPHILS # BLD AUTO: 3.6 10E9/L (ref 1.6–8.3)
NEUTROPHILS NFR BLD AUTO: 68.9 %
NRBC # BLD AUTO: 0 10*3/UL
NRBC BLD AUTO-RTO: 0 /100
NUM BPU REQUESTED: 1
PLATELET # BLD AUTO: 110 10E9/L (ref 150–450)
POTASSIUM BLD-SCNC: 5.2 MMOL/L (ref 3.4–5.3)
RBC # BLD AUTO: 2.3 10E12/L (ref 4.4–5.9)
SODIUM BLD-SCNC: 134 MMOL/L (ref 133–144)
SPECIMEN EXP DATE BLD: NORMAL
TRANSFUSION STATUS PATIENT QL: NORMAL
TRANSFUSION STATUS PATIENT QL: NORMAL
WBC # BLD AUTO: 5.3 10E9/L (ref 4–11)

## 2019-03-01 PROCEDURE — 80047 BASIC METABLC PNL IONIZED CA: CPT

## 2019-03-01 PROCEDURE — 86900 BLOOD TYPING SEROLOGIC ABO: CPT | Performed by: EMERGENCY MEDICINE

## 2019-03-01 PROCEDURE — 25000125 ZZHC RX 250: Performed by: EMERGENCY MEDICINE

## 2019-03-01 PROCEDURE — 71046 X-RAY EXAM CHEST 2 VIEWS: CPT

## 2019-03-01 PROCEDURE — 36430 TRANSFUSION BLD/BLD COMPNT: CPT

## 2019-03-01 PROCEDURE — 86923 COMPATIBILITY TEST ELECTRIC: CPT | Performed by: EMERGENCY MEDICINE

## 2019-03-01 PROCEDURE — 94640 AIRWAY INHALATION TREATMENT: CPT

## 2019-03-01 PROCEDURE — 86901 BLOOD TYPING SEROLOGIC RH(D): CPT | Performed by: EMERGENCY MEDICINE

## 2019-03-01 PROCEDURE — P9016 RBC LEUKOCYTES REDUCED: HCPCS | Performed by: EMERGENCY MEDICINE

## 2019-03-01 PROCEDURE — 86850 RBC ANTIBODY SCREEN: CPT | Performed by: EMERGENCY MEDICINE

## 2019-03-01 PROCEDURE — 99285 EMERGENCY DEPT VISIT HI MDM: CPT | Mod: 25

## 2019-03-01 PROCEDURE — 85014 HEMATOCRIT: CPT

## 2019-03-01 PROCEDURE — 85025 COMPLETE CBC W/AUTO DIFF WBC: CPT | Performed by: EMERGENCY MEDICINE

## 2019-03-01 RX ORDER — LIDOCAINE 40 MG/G
CREAM TOPICAL
Status: DISCONTINUED | OUTPATIENT
Start: 2019-03-01 | End: 2019-03-01 | Stop reason: HOSPADM

## 2019-03-01 RX ORDER — ALBUTEROL SULFATE 0.83 MG/ML
2.5 SOLUTION RESPIRATORY (INHALATION) ONCE
Status: COMPLETED | OUTPATIENT
Start: 2019-03-01 | End: 2019-03-01

## 2019-03-01 RX ADMIN — ALBUTEROL SULFATE 2.5 MG: 2.5 SOLUTION RESPIRATORY (INHALATION) at 16:25

## 2019-03-01 ASSESSMENT — ENCOUNTER SYMPTOMS
DIARRHEA: 0
LIGHT-HEADEDNESS: 1
CHILLS: 0
FEVER: 0
ABDOMINAL PAIN: 0
COUGH: 1
SHORTNESS OF BREATH: 1
HEMATURIA: 0
BLOOD IN STOOL: 0
FATIGUE: 1
NAUSEA: 0
DYSURIA: 0
VOMITING: 0

## 2019-03-01 NOTE — ED TRIAGE NOTES
ABC's intact.  Alert and oriented x4.    Pt states he had dialysis per usual yesterday.  Today received phone call to present to ED because Hgb is now 6.8 and he should be transfused.  Pt c/o fatigue but no other issues.

## 2019-03-01 NOTE — ED PROVIDER NOTES
History     Chief Complaint:  Anemia      HPI   Donald Raza is a 69 year old male on dialysis M, T, Th, & S who presents with anemia. The patient states he was at dialysis yesterday and was told that he had a low hemoglobin and needed to come to the ED for a transfusion. At the time they called him he believed it was too late to come be seen so he waited until today. He notes he feels generalized weakness and light headedness for the past two days. The patient also reports to feeling short of breath and having a congestive cough for the past three weeks. He uses an inhaler with little to no relief. However he states this is improving. He denies fever, chills, chest pain, abdominal pain, nausea, vomiting, diarrhea, black tarry stool, blood in stool, dysuria, hematuria, Of note, his last transfusion was four weeks ago but was transferred to [facility] since there was no rooms for him here at [facility].     Allergies:  Calcium Acetate   Lisinopril   Sulfa Drugs       Medications:    Abacavir  Albuterol   Aspirin   Lipitor  Coreg  Clonazepam   Clopidogrel bisulfate   Dapsone   Darunavir  Dolutegravin   Doxercalciferol   Epoetin brittni  Gabapentin   Lantus solostar  Humalog  Duoneb   Avapro  Venofer  Isosorbide mononitrate   Magnesium oxide   Remeron   Nitroglycerin   Lovaza  Protonix   Norvir  Velphoro     Past Medical History:    CAD  CKD  Colon Cancer   COPD  Depression   Dilated aortic Root  Diverticulitis   ESRD  HIV  Hypertension   Hyperlipidemia   Pulmonary HTN  TIA   Diabetes     Past Surgical History:    Angiogram  Appendectomy  Cholecystectomy  Colostomy  Left heart Catherization  Angioplasty  Stent    Family History:    Heart disease  HTN    Social History:  Marital Status:   [2]  Former smoker  Negative for alcohol use.      Review of Systems   Constitutional: Positive for fatigue. Negative for chills and fever.   HENT: Positive for congestion.    Respiratory: Positive for cough and shortness of breath.     Cardiovascular: Negative for chest pain.   Gastrointestinal: Negative for abdominal pain, blood in stool, diarrhea, nausea and vomiting.   Genitourinary: Negative for dysuria and hematuria.   Neurological: Positive for light-headedness.   All other systems reviewed and are negative.      Physical Exam     Patient Vitals for the past 24 hrs:   BP Temp Temp src Pulse Heart Rate Resp SpO2   03/01/19 1915 165/77 -- -- 68 69 21 99 %   03/01/19 1900 168/83 -- -- 69 69 21 95 %   03/01/19 1845 160/80 -- -- 69 69 20 95 %   03/01/19 1830 159/80 -- -- 68 69 23 92 %   03/01/19 1815 151/74 -- -- 68 67 21 97 %   03/01/19 1800 154/75 -- -- 67 66 14 94 %   03/01/19 1745 161/77 98.6  F (37  C) -- 67 69 23 94 %   03/01/19 1731 160/78 97.9  F (36.6  C) -- 68 -- 24 --   03/01/19 1730 160/78 -- -- 68 67 17 95 %   03/01/19 1700 162/79 -- -- 66 66 16 96 %   03/01/19 1645 160/80 -- -- 68 67 26 97 %   03/01/19 1630 157/71 -- -- 66 67 17 96 %   03/01/19 1615 156/74 -- -- 65 67 19 95 %   03/01/19 1600 -- -- -- -- 68 14 100 %   03/01/19 1545 -- -- -- -- 67 22 100 %   03/01/19 1440 195/89 96.6  F (35.9  C) Temporal 70 -- 20 100 %         Physical Exam    Nursing note and vitals reviewed.    Constitutional: Pleasant and well groomed. Mildly pale appearing.          HENT:    Mouth/Throat: Oropharynx is without swelling or erythema. Oral mucosa moist.    Eyes: Conjunctivae are normal. No scleral icterus.    Neck: Neck supple.   Cardiovascular: Normal rate, regular rhythm and intact distal pulses.    Pulmonary/Chest: Effort normal and breath sounds normal.   Abdominal: Soft.  No distension. There is no tenderness.   Musculoskeletal:  No edema, No calf tenderness. Access in left upper arm with  intact thrill end pulse  Neurological:Alert and oriented. Coordination normal.   Skin: Skin is warm and dry.   Psychiatric: Normal mood and affect.       Emergency Department Course   ECG:  Indication: Fatigue  Time: 1549  Vent. Rate 67 bpm. ND interval  244. QRS duration 92. QT/QTc 396/418. P-R-T axis -1 15 105.  Sinus rhythm with 1st degree AV blood. T wave abnormality, consider lateral ischemia. Abnormal ECG.. No significant change compared to EKG dated 1/29/11. Read time: 1559    Imaging:  XR Chest PA & LAT:   Mild cardiomegaly. No evidence for pulmonary vascular  congestion, pleural effusion, or acute pulmonary disease as per radiology.       Laboratory:  CBC: WBC: 5.3, HGB: 6.7(LL), PLT: 110(L)  BMP POCT: Glucose 114(H), BUN 53(H), Creatinine 7.5(H), GFR 7(L), Hgb 7.1(L), Hematocrit 21(L), o/w WNL     ABO/Rh type: O Positive    Interventions:  1625 Albuterol 2.5mg Nebulization     Emergency Department Course:  Nursing notes and vitals reviewed. (2380) I performed an exam of the patient as documented above.     IV inserted. Medicine administered as documented above. Blood drawn. This was sent to the lab for further testing, results above.    EKG was done, interpretation as above.    The patient was sent for a Chest XR while in the emergency department, findings above.     (8938) I consulted with Dr. Lawler, Nephrology, regarding the patient's history and presentation here in the emergency department.    (1998) I rechecked the patient and discussed the results of his workup thus far. The patient was transfused without any complications. He is feeling better and feels well for discharge home    Findings and plan explained to the Patient. Patient discharged home with instructions regarding supportive care, medications, and reasons to return. The importance of close follow-up was reviewed.     I personally reviewed the laboratory results with the Patient and answered all related questions prior to discharge.      Impression & Plan    Medical Decision Making:  Donald Raza is a 69 year old male presents after he was told to come to the emergency department due to anemia for a transfusion.  He is not describing any acute symptoms.  He has had some bronchitis which  is improving.  He denies a source of bleeding such as hematemesis or black tarry stools.  He reports having to have occasional transfusions due to his chronic kidney disease and believes the most recent one was about a month ago.  Repeat hemoglobin today was stable compared with yesterday at 6.7. He did have diffuse wheezing on examination. I obtained a chest x-ray which was negative for pneumonia.  The remainder of his labs were non-concerning.  I consulted with Dr. Lawler who is on-call for [facility] nephrology who did request that we transfuse him as they will now not be able to arrange for this tomorrow through hemodialysis.  The patient otherwise feels well for discharge home.  With reasonable clinical certainty ability safe for discharge home with ongoing evaluation and management as an outpatient.  He is to follow-up for hemodialysis tomorrow as regularly scheduled and have a repeat hemoglobin on Monday.      Diagnosis:    ICD-10-CM    1. Anemia due to chronic kidney disease, on chronic dialysis (H) N18.6 Blood component    D63.1     Z99.2    2. ESRD (end stage renal disease) on dialysis (H) N18.6     Z99.2    3. Bronchitis J40        Disposition:  discharged to home    Discharge Medications:     Medication List      ASK your doctor about these medications    amoxicillin-clavulanate 500-125 MG tablet  Commonly known as:  AUGMENTIN  1 tablet, Oral, EVERY EVENING  Ask about: Should I take this medication?     predniSONE 20 MG tablet  Commonly known as:  DELTASONE  40 mg, Oral, DAILY  Ask about: Should I take this medication?            Scribe Disclosure:  I, Edwina Gomez, am serving as a scribe on 3/1/2019 at 3:08 PM to personally document services performed by Soraya Nixon* based on my observations and the provider's statements to me.       Edwina Gomez  3/1/2019   [facility] EMERGENCY DEPARTMENT       Soraya Nixon MD  03/04/19 0040

## 2019-03-01 NOTE — ED AVS SNAPSHOT
[facility] Emergency Department  [address]  [facility] [address]  Phone:  [phone]  Fax:  [phone]                                    Donald Raza   MRN: [id number]    Department:  [facility] Emergency Department   Date of Visit:  3/1/2019           After Visit Summary Signature Page    I have received my discharge instructions, and my questions have been answered. I have discussed any challenges I see with this plan with the nurse or doctor.    ..........................................................................................................................................  Patient/Patient Representative Signature      ..........................................................................................................................................  Patient Representative Print Name and Relationship to Patient    ..................................................               ................................................  Date                                   Time    ..........................................................................................................................................  Reviewed by Signature/Title    ...................................................              ..............................................  Date                                               Time          22EPIC Rev 08/18

## 2019-03-02 LAB — INTERPRETATION ECG - MUSE: NORMAL

## 2019-03-02 NOTE — DISCHARGE INSTRUCTIONS
Hemodialysis tomorrow as scheduled. Return to the ED with new/concerning symptoms.     Repeat hemoglobin on Monday.

## 2019-03-15 NOTE — ACP (ADVANCE CARE PLANNING)
Health Maintenance Due   Topic Date Due   • Hepatitis C Screening  11/18/2007   • Shingles Vaccine (2 of 3) 09/22/2017   • Breast Cancer Screening  11/11/2017   • Colorectal Cancer Screening-Colonoscopy  09/19/2018       Patient has orders placed.             SH worked with pt to complete long from ACD form.  Form notarized . Pt received original and 2 copies. Copy submitted to Med records via [facility].

## 2019-05-02 ENCOUNTER — APPOINTMENT (OUTPATIENT)
Dept: GENERAL RADIOLOGY | Facility: CLINIC | Age: 69
DRG: 242 | End: 2019-05-02
Attending: EMERGENCY MEDICINE
Payer: MEDICARE

## 2019-05-02 ENCOUNTER — HOSPITAL ENCOUNTER (INPATIENT)
Facility: CLINIC | Age: 69
LOS: 4 days | Discharge: HOME HEALTH | DRG: 242 | End: 2019-05-06
Attending: EMERGENCY MEDICINE | Admitting: INTERNAL MEDICINE
Payer: MEDICARE

## 2019-05-02 ENCOUNTER — APPOINTMENT (OUTPATIENT)
Dept: CARDIOLOGY | Facility: CLINIC | Age: 69
DRG: 242 | End: 2019-05-02
Attending: HOSPITALIST
Payer: MEDICARE

## 2019-05-02 DIAGNOSIS — N18.6 ESRD (END STAGE RENAL DISEASE) ON DIALYSIS (H): Chronic | ICD-10-CM

## 2019-05-02 DIAGNOSIS — Z99.2 ESRD (END STAGE RENAL DISEASE) ON DIALYSIS (H): Chronic | ICD-10-CM

## 2019-05-02 DIAGNOSIS — R00.1 BRADYCARDIA: ICD-10-CM

## 2019-05-02 DIAGNOSIS — Z95.0 CARDIAC PACEMAKER IN SITU: Primary | ICD-10-CM

## 2019-05-02 DIAGNOSIS — R07.9 CHEST PAIN, UNSPECIFIED TYPE: ICD-10-CM

## 2019-05-02 DIAGNOSIS — D64.9 ANEMIA, UNSPECIFIED TYPE: ICD-10-CM

## 2019-05-02 PROBLEM — I20.89 ANGINA AT REST (H): Status: ACTIVE | Noted: 2019-05-02

## 2019-05-02 LAB
ALBUMIN SERPL-MCNC: 4 G/DL (ref 3.4–5)
ALP SERPL-CCNC: 174 U/L (ref 40–150)
ALT SERPL W P-5'-P-CCNC: 17 U/L (ref 0–70)
ANION GAP SERPL CALCULATED.3IONS-SCNC: 14 MMOL/L (ref 3–14)
AST SERPL W P-5'-P-CCNC: 11 U/L (ref 0–45)
BASOPHILS # BLD AUTO: 0 10E9/L (ref 0–0.2)
BASOPHILS NFR BLD AUTO: 0.5 %
BILIRUB SERPL-MCNC: 0.5 MG/DL (ref 0.2–1.3)
BUN SERPL-MCNC: 92 MG/DL (ref 7–30)
CALCIUM SERPL-MCNC: 9.7 MG/DL (ref 8.5–10.1)
CHLORIDE SERPL-SCNC: 95 MMOL/L (ref 94–109)
CO2 SERPL-SCNC: 23 MMOL/L (ref 20–32)
CREAT SERPL-MCNC: 8.53 MG/DL (ref 0.66–1.25)
DIFFERENTIAL METHOD BLD: ABNORMAL
EOSINOPHIL # BLD AUTO: 0.1 10E9/L (ref 0–0.7)
EOSINOPHIL NFR BLD AUTO: 1.2 %
ERYTHROCYTE [DISTWIDTH] IN BLOOD BY AUTOMATED COUNT: 16.5 % (ref 10–15)
GFR SERPL CREATININE-BSD FRML MDRD: 6 ML/MIN/{1.73_M2}
GLUCOSE BLDC GLUCOMTR-MCNC: 114 MG/DL (ref 70–99)
GLUCOSE BLDC GLUCOMTR-MCNC: 148 MG/DL (ref 70–99)
GLUCOSE BLDC GLUCOMTR-MCNC: 185 MG/DL (ref 70–99)
GLUCOSE SERPL-MCNC: 245 MG/DL (ref 70–99)
HCT VFR BLD AUTO: 25.1 % (ref 40–53)
HGB BLD-MCNC: 8.1 G/DL (ref 13.3–17.7)
IMM GRANULOCYTES # BLD: 0 10E9/L (ref 0–0.4)
IMM GRANULOCYTES NFR BLD: 0.7 %
INR PPP: 1.06 (ref 0.86–1.14)
INTERPRETATION ECG - MUSE: NORMAL
INTERPRETATION ECG - MUSE: NORMAL
LYMPHOCYTES # BLD AUTO: 1.4 10E9/L (ref 0.8–5.3)
LYMPHOCYTES NFR BLD AUTO: 23.8 %
MAGNESIUM SERPL-MCNC: 2.7 MG/DL (ref 1.6–2.3)
MCH RBC QN AUTO: 30.2 PG (ref 26.5–33)
MCHC RBC AUTO-ENTMCNC: 32.3 G/DL (ref 31.5–36.5)
MCV RBC AUTO: 94 FL (ref 78–100)
MONOCYTES # BLD AUTO: 0.5 10E9/L (ref 0–1.3)
MONOCYTES NFR BLD AUTO: 7.8 %
NEUTROPHILS # BLD AUTO: 4 10E9/L (ref 1.6–8.3)
NEUTROPHILS NFR BLD AUTO: 66 %
NRBC # BLD AUTO: 0 10*3/UL
NRBC BLD AUTO-RTO: 1 /100
PLATELET # BLD AUTO: 102 10E9/L (ref 150–450)
POTASSIUM SERPL-SCNC: 3.9 MMOL/L (ref 3.4–5.3)
PROT SERPL-MCNC: 8 G/DL (ref 6.8–8.8)
RBC # BLD AUTO: 2.68 10E12/L (ref 4.4–5.9)
SODIUM SERPL-SCNC: 132 MMOL/L (ref 133–144)
TROPONIN I SERPL-MCNC: 0.02 UG/L (ref 0–0.04)
WBC # BLD AUTO: 6 10E9/L (ref 4–11)

## 2019-05-02 PROCEDURE — 99220 ZZC INITIAL OBSERVATION CARE,LEVL III: CPT | Mod: AI | Performed by: HOSPITALIST

## 2019-05-02 PROCEDURE — 93306 TTE W/DOPPLER COMPLETE: CPT | Mod: 26 | Performed by: INTERNAL MEDICINE

## 2019-05-02 PROCEDURE — 83735 ASSAY OF MAGNESIUM: CPT | Performed by: EMERGENCY MEDICINE

## 2019-05-02 PROCEDURE — 25000132 ZZH RX MED GY IP 250 OP 250 PS 637: Performed by: EMERGENCY MEDICINE

## 2019-05-02 PROCEDURE — 33208 INSRT HEART PM ATRIAL & VENT: CPT | Performed by: INTERNAL MEDICINE

## 2019-05-02 PROCEDURE — 27210794 ZZH OR GENERAL SUPPLY STERILE: Performed by: INTERNAL MEDICINE

## 2019-05-02 PROCEDURE — 86901 BLOOD TYPING SEROLOGIC RH(D): CPT | Performed by: EMERGENCY MEDICINE

## 2019-05-02 PROCEDURE — 25000131 ZZH RX MED GY IP 250 OP 636 PS 637: Performed by: HOSPITALIST

## 2019-05-02 PROCEDURE — 02H63JZ INSERTION OF PACEMAKER LEAD INTO RIGHT ATRIUM, PERCUTANEOUS APPROACH: ICD-10-PCS | Performed by: INTERNAL MEDICINE

## 2019-05-02 PROCEDURE — 25000128 H RX IP 250 OP 636: Performed by: HOSPITALIST

## 2019-05-02 PROCEDURE — 85610 PROTHROMBIN TIME: CPT | Performed by: EMERGENCY MEDICINE

## 2019-05-02 PROCEDURE — 99223 1ST HOSP IP/OBS HIGH 75: CPT | Mod: 25 | Performed by: INTERNAL MEDICINE

## 2019-05-02 PROCEDURE — A9270 NON-COVERED ITEM OR SERVICE: HCPCS | Performed by: INTERNAL MEDICINE

## 2019-05-02 PROCEDURE — 25000128 H RX IP 250 OP 636: Performed by: INTERNAL MEDICINE

## 2019-05-02 PROCEDURE — 86923 COMPATIBILITY TEST ELECTRIC: CPT | Performed by: EMERGENCY MEDICINE

## 2019-05-02 PROCEDURE — 86900 BLOOD TYPING SEROLOGIC ABO: CPT | Performed by: EMERGENCY MEDICINE

## 2019-05-02 PROCEDURE — 21000001 ZZH R&B HEART CARE

## 2019-05-02 PROCEDURE — 25000132 ZZH RX MED GY IP 250 OP 250 PS 637: Performed by: INTERNAL MEDICINE

## 2019-05-02 PROCEDURE — 80053 COMPREHEN METABOLIC PANEL: CPT | Performed by: EMERGENCY MEDICINE

## 2019-05-02 PROCEDURE — 99285 EMERGENCY DEPT VISIT HI MDM: CPT | Mod: 25

## 2019-05-02 PROCEDURE — 93005 ELECTROCARDIOGRAM TRACING: CPT

## 2019-05-02 PROCEDURE — 93005 ELECTROCARDIOGRAM TRACING: CPT | Mod: 76

## 2019-05-02 PROCEDURE — A9270 NON-COVERED ITEM OR SERVICE: HCPCS | Performed by: HOSPITALIST

## 2019-05-02 PROCEDURE — 25000125 ZZHC RX 250: Performed by: INTERNAL MEDICINE

## 2019-05-02 PROCEDURE — 25000132 ZZH RX MED GY IP 250 OP 250 PS 637: Performed by: HOSPITALIST

## 2019-05-02 PROCEDURE — 84484 ASSAY OF TROPONIN QUANT: CPT | Performed by: EMERGENCY MEDICINE

## 2019-05-02 PROCEDURE — 00000146 ZZHCL STATISTIC GLUCOSE BY METER IP

## 2019-05-02 PROCEDURE — 36415 COLL VENOUS BLD VENIPUNCTURE: CPT | Performed by: HOSPITALIST

## 2019-05-02 PROCEDURE — 93306 TTE W/DOPPLER COMPLETE: CPT

## 2019-05-02 PROCEDURE — 84484 ASSAY OF TROPONIN QUANT: CPT | Performed by: HOSPITALIST

## 2019-05-02 PROCEDURE — 71045 X-RAY EXAM CHEST 1 VIEW: CPT

## 2019-05-02 PROCEDURE — A9270 NON-COVERED ITEM OR SERVICE: HCPCS | Performed by: EMERGENCY MEDICINE

## 2019-05-02 PROCEDURE — 63400005 ZZH RX 634: Performed by: INTERNAL MEDICINE

## 2019-05-02 PROCEDURE — 25000128 H RX IP 250 OP 636

## 2019-05-02 PROCEDURE — 99207 ZZC CDG-CODE CATEGORY CHANGED: CPT | Performed by: HOSPITALIST

## 2019-05-02 PROCEDURE — 02HK3JZ INSERTION OF PACEMAKER LEAD INTO RIGHT VENTRICLE, PERCUTANEOUS APPROACH: ICD-10-PCS | Performed by: INTERNAL MEDICINE

## 2019-05-02 PROCEDURE — C1785 PMKR, DUAL, RATE-RESP: HCPCS | Performed by: INTERNAL MEDICINE

## 2019-05-02 PROCEDURE — 85025 COMPLETE CBC W/AUTO DIFF WBC: CPT | Performed by: EMERGENCY MEDICINE

## 2019-05-02 PROCEDURE — 86850 RBC ANTIBODY SCREEN: CPT | Performed by: EMERGENCY MEDICINE

## 2019-05-02 PROCEDURE — 90937 HEMODIALYSIS REPEATED EVAL: CPT

## 2019-05-02 PROCEDURE — C1894 INTRO/SHEATH, NON-LASER: HCPCS | Performed by: INTERNAL MEDICINE

## 2019-05-02 PROCEDURE — C1898 LEAD, PMKR, OTHER THAN TRANS: HCPCS | Performed by: INTERNAL MEDICINE

## 2019-05-02 PROCEDURE — G0378 HOSPITAL OBSERVATION PER HR: HCPCS

## 2019-05-02 PROCEDURE — 0JH606Z INSERTION OF PACEMAKER, DUAL CHAMBER INTO CHEST SUBCUTANEOUS TISSUE AND FASCIA, OPEN APPROACH: ICD-10-PCS | Performed by: INTERNAL MEDICINE

## 2019-05-02 DEVICE — PCMKR CARD ACCOLADE MRI EL DR: Type: IMPLANTABLE DEVICE | Status: FUNCTIONAL

## 2019-05-02 DEVICE — IMPLANTABLE DEVICE: Type: IMPLANTABLE DEVICE | Status: FUNCTIONAL

## 2019-05-02 RX ORDER — IPRATROPIUM BROMIDE AND ALBUTEROL SULFATE 2.5; .5 MG/3ML; MG/3ML
1 SOLUTION RESPIRATORY (INHALATION) EVERY 6 HOURS PRN
Status: DISCONTINUED | OUTPATIENT
Start: 2019-05-02 | End: 2019-01-01 | Stop reason: HOSPADM

## 2019-05-02 RX ORDER — ONDANSETRON 2 MG/ML
4 INJECTION INTRAMUSCULAR; INTRAVENOUS EVERY 6 HOURS PRN
Status: DISCONTINUED | OUTPATIENT
Start: 2019-05-02 | End: 2019-01-01 | Stop reason: HOSPADM

## 2019-05-02 RX ORDER — ATORVASTATIN CALCIUM 40 MG/1
40 TABLET, FILM COATED ORAL AT BEDTIME
Status: DISCONTINUED | OUTPATIENT
Start: 2019-05-02 | End: 2019-01-01 | Stop reason: HOSPADM

## 2019-05-02 RX ORDER — AMOXICILLIN 250 MG
1 CAPSULE ORAL 2 TIMES DAILY PRN
Status: DISCONTINUED | OUTPATIENT
Start: 2019-05-02 | End: 2019-01-01 | Stop reason: HOSPADM

## 2019-05-02 RX ORDER — ONDANSETRON 4 MG/1
4 TABLET, ORALLY DISINTEGRATING ORAL EVERY 6 HOURS PRN
Status: DISCONTINUED | OUTPATIENT
Start: 2019-05-02 | End: 2019-01-01 | Stop reason: HOSPADM

## 2019-05-02 RX ORDER — ASPIRIN 81 MG/1
81 TABLET ORAL DAILY
Status: DISCONTINUED | OUTPATIENT
Start: 2019-05-03 | End: 2019-01-01 | Stop reason: HOSPADM

## 2019-05-02 RX ORDER — IRBESARTAN 150 MG/1
300 TABLET ORAL AT BEDTIME
Status: DISCONTINUED | OUTPATIENT
Start: 2019-05-02 | End: 2019-01-01 | Stop reason: HOSPADM

## 2019-05-02 RX ORDER — HEPARIN SODIUM 200 [USP'U]/100ML
100-500 INJECTION, SOLUTION INTRAVENOUS CONTINUOUS PRN
Status: DISCONTINUED | OUTPATIENT
Start: 2019-05-02 | End: 2019-05-02 | Stop reason: HOSPADM

## 2019-05-02 RX ORDER — FENTANYL CITRATE 50 UG/ML
INJECTION, SOLUTION INTRAMUSCULAR; INTRAVENOUS
Status: DISCONTINUED | OUTPATIENT
Start: 2019-05-02 | End: 2019-05-02 | Stop reason: HOSPADM

## 2019-05-02 RX ORDER — POLYETHYLENE GLYCOL 3350 17 G/17G
17 POWDER, FOR SOLUTION ORAL DAILY PRN
Status: DISCONTINUED | OUTPATIENT
Start: 2019-05-02 | End: 2019-01-01 | Stop reason: HOSPADM

## 2019-05-02 RX ORDER — NICOTINE POLACRILEX 4 MG
15-30 LOZENGE BUCCAL
Status: DISCONTINUED | OUTPATIENT
Start: 2019-05-02 | End: 2019-05-03

## 2019-05-02 RX ORDER — CARVEDILOL 12.5 MG/1
12.5 TABLET ORAL 2 TIMES DAILY WITH MEALS
Status: DISCONTINUED | OUTPATIENT
Start: 2019-05-02 | End: 2019-01-01 | Stop reason: HOSPADM

## 2019-05-02 RX ORDER — PANTOPRAZOLE SODIUM 40 MG/1
40 TABLET, DELAYED RELEASE ORAL DAILY
Status: DISCONTINUED | OUTPATIENT
Start: 2019-05-02 | End: 2019-01-01 | Stop reason: HOSPADM

## 2019-05-02 RX ORDER — LIDOCAINE 40 MG/G
CREAM TOPICAL
Status: DISCONTINUED | OUTPATIENT
Start: 2019-05-02 | End: 2019-01-01 | Stop reason: HOSPADM

## 2019-05-02 RX ORDER — POLYETHYLENE GLYCOL 3350 17 G/17G
1 POWDER, FOR SOLUTION ORAL DAILY PRN
COMMUNITY

## 2019-05-02 RX ORDER — HEPARIN SODIUM 5000 [USP'U]/.5ML
5000 INJECTION, SOLUTION INTRAVENOUS; SUBCUTANEOUS EVERY 12 HOURS
Status: DISCONTINUED | OUTPATIENT
Start: 2019-05-02 | End: 2019-01-01

## 2019-05-02 RX ORDER — GABAPENTIN 300 MG/1
300 CAPSULE ORAL PRN
COMMUNITY
End: 2019-01-01

## 2019-05-02 RX ORDER — SODIUM CHLORIDE 450 MG/100ML
INJECTION, SOLUTION INTRAVENOUS CONTINUOUS
Status: CANCELLED | OUTPATIENT
Start: 2019-05-02

## 2019-05-02 RX ORDER — NITROGLYCERIN 0.4 MG/1
0.4 TABLET SUBLINGUAL EVERY 5 MIN PRN
Status: DISCONTINUED | OUTPATIENT
Start: 2019-05-02 | End: 2019-01-01 | Stop reason: HOSPADM

## 2019-05-02 RX ORDER — NALOXONE HYDROCHLORIDE 0.4 MG/ML
.1-.4 INJECTION, SOLUTION INTRAMUSCULAR; INTRAVENOUS; SUBCUTANEOUS
Status: DISCONTINUED | OUTPATIENT
Start: 2019-05-02 | End: 2019-01-01 | Stop reason: HOSPADM

## 2019-05-02 RX ORDER — NITROGLYCERIN 0.4 MG/1
0.4 TABLET SUBLINGUAL EVERY 5 MIN PRN
Status: DISCONTINUED | OUTPATIENT
Start: 2019-05-02 | End: 2019-05-02

## 2019-05-02 RX ORDER — DOXERCALCIFEROL 4 UG/2ML
4 INJECTION INTRAVENOUS
Status: DISCONTINUED | OUTPATIENT
Start: 2019-05-02 | End: 2019-05-02

## 2019-05-02 RX ORDER — RITONAVIR 100 MG/1
100 TABLET ORAL AT BEDTIME
Status: DISCONTINUED | OUTPATIENT
Start: 2019-05-02 | End: 2019-01-01 | Stop reason: HOSPADM

## 2019-05-02 RX ORDER — DEXTROSE MONOHYDRATE 25 G/50ML
25-50 INJECTION, SOLUTION INTRAVENOUS
Status: DISCONTINUED | OUTPATIENT
Start: 2019-05-02 | End: 2019-05-03

## 2019-05-02 RX ORDER — GABAPENTIN 300 MG/1
300 CAPSULE ORAL 2 TIMES DAILY
Status: DISCONTINUED | OUTPATIENT
Start: 2019-05-02 | End: 2019-01-01 | Stop reason: HOSPADM

## 2019-05-02 RX ORDER — ACETAMINOPHEN 325 MG/1
650 TABLET ORAL EVERY 4 HOURS PRN
Status: DISCONTINUED | OUTPATIENT
Start: 2019-05-02 | End: 2019-05-03

## 2019-05-02 RX ORDER — LORAZEPAM 2 MG/ML
INJECTION INTRAMUSCULAR
Status: COMPLETED
Start: 2019-05-02 | End: 2019-05-02

## 2019-05-02 RX ORDER — ABACAVIR 300 MG/1
600 TABLET ORAL EVERY EVENING
Status: DISCONTINUED | OUTPATIENT
Start: 2019-05-02 | End: 2019-01-01 | Stop reason: HOSPADM

## 2019-05-02 RX ORDER — DOBUTAMINE HYDROCHLORIDE 200 MG/100ML
5-40 INJECTION INTRAVENOUS CONTINUOUS PRN
Status: DISCONTINUED | OUTPATIENT
Start: 2019-05-02 | End: 2019-05-02 | Stop reason: HOSPADM

## 2019-05-02 RX ORDER — HYDRALAZINE HYDROCHLORIDE 25 MG/1
25 TABLET, FILM COATED ORAL 3 TIMES DAILY
Status: DISCONTINUED | OUTPATIENT
Start: 2019-05-02 | End: 2019-01-01 | Stop reason: HOSPADM

## 2019-05-02 RX ORDER — BUPIVACAINE HYDROCHLORIDE 2.5 MG/ML
INJECTION, SOLUTION EPIDURAL; INFILTRATION; INTRACAUDAL
Status: DISCONTINUED | OUTPATIENT
Start: 2019-05-02 | End: 2019-05-02 | Stop reason: HOSPADM

## 2019-05-02 RX ORDER — LIDOCAINE 40 MG/G
CREAM TOPICAL
Status: CANCELLED | OUTPATIENT
Start: 2019-05-02

## 2019-05-02 RX ORDER — LORAZEPAM 2 MG/ML
0.5 INJECTION INTRAMUSCULAR ONCE
Status: COMPLETED | OUTPATIENT
Start: 2019-05-02 | End: 2019-05-02

## 2019-05-02 RX ORDER — ISOSORBIDE MONONITRATE 60 MG/1
120 TABLET, EXTENDED RELEASE ORAL EVERY EVENING
Status: DISCONTINUED | OUTPATIENT
Start: 2019-05-02 | End: 2019-01-01 | Stop reason: HOSPADM

## 2019-05-02 RX ORDER — CLOPIDOGREL BISULFATE 75 MG/1
75 TABLET ORAL EVERY EVENING
Status: DISCONTINUED | OUTPATIENT
Start: 2019-05-02 | End: 2019-01-01 | Stop reason: HOSPADM

## 2019-05-02 RX ORDER — DAPSONE 100 MG/1
100 TABLET ORAL AT BEDTIME
Status: DISCONTINUED | OUTPATIENT
Start: 2019-05-02 | End: 2019-01-01 | Stop reason: HOSPADM

## 2019-05-02 RX ORDER — MIRTAZAPINE 15 MG/1
15 TABLET, FILM COATED ORAL AT BEDTIME
Status: DISCONTINUED | OUTPATIENT
Start: 2019-05-02 | End: 2019-01-01 | Stop reason: HOSPADM

## 2019-05-02 RX ORDER — HEPARIN SODIUM 1000 [USP'U]/ML
500 INJECTION, SOLUTION INTRAVENOUS; SUBCUTANEOUS CONTINUOUS
Status: DISCONTINUED | OUTPATIENT
Start: 2019-05-02 | End: 2019-05-02

## 2019-05-02 RX ORDER — AMOXICILLIN 250 MG
2 CAPSULE ORAL 2 TIMES DAILY PRN
Status: DISCONTINUED | OUTPATIENT
Start: 2019-05-02 | End: 2019-01-01 | Stop reason: HOSPADM

## 2019-05-02 RX ORDER — ACETAMINOPHEN 325 MG/1
650 TABLET ORAL EVERY 4 HOURS PRN
Status: DISCONTINUED | OUTPATIENT
Start: 2019-05-02 | End: 2019-01-01 | Stop reason: HOSPADM

## 2019-05-02 RX ORDER — HEPARIN SODIUM 1000 [USP'U]/ML
500 INJECTION, SOLUTION INTRAVENOUS; SUBCUTANEOUS
Status: DISCONTINUED | OUTPATIENT
Start: 2019-05-02 | End: 2019-05-02

## 2019-05-02 RX ORDER — CLONAZEPAM 0.5 MG/1
0.5 TABLET ORAL
Status: DISCONTINUED | OUTPATIENT
Start: 2019-05-02 | End: 2019-01-01 | Stop reason: HOSPADM

## 2019-05-02 RX ORDER — ASPIRIN 81 MG/1
324 TABLET, CHEWABLE ORAL ONCE
Status: COMPLETED | OUTPATIENT
Start: 2019-05-02 | End: 2019-05-02

## 2019-05-02 RX ORDER — OXYCODONE AND ACETAMINOPHEN 5; 325 MG/1; MG/1
1 TABLET ORAL EVERY 4 HOURS PRN
Status: DISCONTINUED | OUTPATIENT
Start: 2019-05-02 | End: 2019-01-01 | Stop reason: HOSPADM

## 2019-05-02 RX ORDER — HEPARIN SODIUM 200 [USP'U]/100ML
100-600 INJECTION, SOLUTION INTRAVENOUS CONTINUOUS PRN
Status: DISCONTINUED | OUTPATIENT
Start: 2019-05-02 | End: 2019-05-02 | Stop reason: HOSPADM

## 2019-05-02 RX ADMIN — SODIUM CHLORIDE 300 ML: 9 INJECTION, SOLUTION INTRAVENOUS at 08:30

## 2019-05-02 RX ADMIN — INSULIN GLARGINE 15 UNITS: 100 INJECTION, SOLUTION SUBCUTANEOUS at 21:35

## 2019-05-02 RX ADMIN — ISOSORBIDE MONONITRATE 120 MG: 60 TABLET, EXTENDED RELEASE ORAL at 21:18

## 2019-05-02 RX ADMIN — HYDRALAZINE HYDROCHLORIDE 25 MG: 25 TABLET ORAL at 21:20

## 2019-05-02 RX ADMIN — LORAZEPAM 0.5 MG: 2 INJECTION INTRAMUSCULAR at 09:12

## 2019-05-02 RX ADMIN — HYDRALAZINE HYDROCHLORIDE 25 MG: 25 TABLET ORAL at 12:33

## 2019-05-02 RX ADMIN — MIRTAZAPINE 15 MG: 15 TABLET, FILM COATED ORAL at 21:20

## 2019-05-02 RX ADMIN — NITROGLYCERIN 0.4 MG: 0.4 TABLET SUBLINGUAL at 04:43

## 2019-05-02 RX ADMIN — GABAPENTIN 300 MG: 300 CAPSULE ORAL at 21:18

## 2019-05-02 RX ADMIN — ACETAMINOPHEN 650 MG: 325 TABLET, FILM COATED ORAL at 21:18

## 2019-05-02 RX ADMIN — HYDRALAZINE HYDROCHLORIDE 25 MG: 25 TABLET ORAL at 19:03

## 2019-05-02 RX ADMIN — MELATONIN 5 MG TABLET 5 MG: at 21:30

## 2019-05-02 RX ADMIN — CARVEDILOL 12.5 MG: 12.5 TABLET, FILM COATED ORAL at 19:03

## 2019-05-02 RX ADMIN — INSULIN GLARGINE 15 UNITS: 100 INJECTION, SOLUTION SUBCUTANEOUS at 12:34

## 2019-05-02 RX ADMIN — CLOPIDOGREL BISULFATE 75 MG: 75 TABLET, FILM COATED ORAL at 21:21

## 2019-05-02 RX ADMIN — GABAPENTIN 300 MG: 300 CAPSULE ORAL at 12:33

## 2019-05-02 RX ADMIN — RITONAVIR 100 MG: 100 TABLET, FILM COATED ORAL at 21:21

## 2019-05-02 RX ADMIN — EPOETIN ALFA 10000 UNITS: 10000 SOLUTION INTRAVENOUS; SUBCUTANEOUS at 10:44

## 2019-05-02 RX ADMIN — DOLUTEGRAVIR SODIUM 50 MG: 50 TABLET, FILM COATED ORAL at 21:18

## 2019-05-02 RX ADMIN — LORAZEPAM 0.5 MG: 2 INJECTION INTRAMUSCULAR; INTRAVENOUS at 09:12

## 2019-05-02 RX ADMIN — HEPARIN SODIUM 5000 UNITS: 5000 INJECTION, SOLUTION INTRAVENOUS; SUBCUTANEOUS at 09:12

## 2019-05-02 RX ADMIN — ATORVASTATIN CALCIUM 40 MG: 40 TABLET, FILM COATED ORAL at 21:21

## 2019-05-02 RX ADMIN — DARUNAVIR 800 MG: 800 TABLET, FILM COATED ORAL at 21:19

## 2019-05-02 RX ADMIN — NITROGLYCERIN 0.4 MG: 0.4 TABLET SUBLINGUAL at 04:31

## 2019-05-02 RX ADMIN — IRBESARTAN 300 MG: 150 TABLET ORAL at 21:23

## 2019-05-02 RX ADMIN — ASPIRIN 81 MG 324 MG: 81 TABLET ORAL at 04:44

## 2019-05-02 RX ADMIN — DAPSONE 100 MG: 100 TABLET ORAL at 21:21

## 2019-05-02 RX ADMIN — HEPARIN SODIUM 5000 UNITS: 5000 INJECTION, SOLUTION INTRAVENOUS; SUBCUTANEOUS at 21:27

## 2019-05-02 RX ADMIN — PANTOPRAZOLE SODIUM 40 MG: 40 TABLET, DELAYED RELEASE ORAL at 12:33

## 2019-05-02 RX ADMIN — ABACAVIR SULFATE 600 MG: 300 TABLET, FILM COATED ORAL at 21:22

## 2019-05-02 ASSESSMENT — ENCOUNTER SYMPTOMS
SHORTNESS OF BREATH: 1
LIGHT-HEADEDNESS: 1
CONSTIPATION: 1
VOMITING: 0
FEVER: 0
CHEST TIGHTNESS: 1
NAUSEA: 0

## 2019-05-02 ASSESSMENT — ACTIVITIES OF DAILY LIVING (ADL): ADLS_ACUITY_SCORE: 15

## 2019-05-02 NOTE — H&P
[facility]    History and Physical  Hospitalist       Date of Admission:  5/2/2019    Assessment & Plan   Donald Raza is a 69 year old male who presents with chest pain, noted to have bradycardia while en route with EMS, symptoms resolved while in ED.    Chest pain  Coronary artery disease with history of PCI  New left bundle branch block  Bradycardia  Has undergone workup in the past, Lexiscan 12/2018 had no areas of ischemia. Cardiac cath 3/2018 showed widely patent stents. Now with bradycardia into 40s noted with EMS and in ED. Received atropine in ambulance with no effect. Was briefly started on dopamine and discontinued. EKG showed left bundle branch block with inverted t waves V5-6, which corrected on repeat EKG after nitroglycerin given. Received full dose ASA in ED  - Cardiology consult, consideration for stress test vs repeat angio  - EKG if chest pain  - Echocardiogram  - Trend troponin  - NPO until cardiac eval  - If return of chest pain, would start heparin gtt  - Continue aggressive medical management (coreg, ASA, statin, irbesartan, hydralazine, imdur, plavix)  - 81mg ASA on 5/3 resumed     End-stage kidney disease on hemodialysis  On Mon, Tues, Thurs, Sat Dialysis.  Due for dialysis today  - Nephrology consult.     Anemia of chronic disease  -hemoglobin 8.1 on admission, has required intermittent transfusion in the past. Continue epogen as scheduled.  - Type and screen     Hypertension  PTA coreg, irbesartan, hydralazine,  Restart home medications once verified by pharmacy.     Diabetes mellitus  PTA lantus 20units BID, scheduled 12 units TID with meals.  - For now continue lantus 15 units BID, add high resistance sliding scale until definitive decision regarding cardiac testing and NPO status  - Trend BGs q4h     HIV  PTA Abacavir, darunavir, dolutegravir, ritonavir continued    Deconditioning  - Consult PT    COPD  No signs acute exacerbation  - PRN duonebs available      DVT  Prophylaxis: Heparin SQ  Code Status: Full Code  Expected discharge: 48-72 hours recommended to prior living arrangement once cardiac workup completed    Shea Mooney DO    Primary Care Physician   Aida Thomas    Chief Complaint   Chest pain, bradycardia    History is obtained from the patient, ED physician    History of Present Illness   Donald Raza is a 69 year old male who presents with onset of chest pain this AM at rest. Associated with shortness of breath. While en route with EMS he was given nitroglycerin without improvement in symptoms, followed by atropine (for low HR) and then dopamine as he evidently did not have a steady blood pressure en route. On arrival he was given 2 doses of nitroglycerin, full dose aspirin and had resolution of pain. He is resting comfortably. States his shortness of breath is resolved--but then laughs and tells me he is on oxygen. Reports constipation, which is improved with miralax. Denies dizziness, lightheadedness, palpitations, numbness, tingling, nausea, vomiting. Reports that the chest pain when present was like a tightness around his chest, also associated with a stabbing sensation like a knife in his left chest.    Past Medical History    I have reviewed this patient's medical history and updated it with pertinent information if needed.   Past Medical History:   Diagnosis Date     Allergic rhinitis      Anemia due to chronic kidney disease 10/21/2011     CAD S/P percutaneous coronary angioplasty 6/15/2015     Cataract      CKD (chronic kidney disease)      Colon cancer (H)      COPD (chronic obstructive pulmonary disease) (H)      Depression      Dilated aortic root (H) 5/6/2016     Diverticulitis      ESRD (end stage renal disease) on dialysis (H) 5/6/2016     Gout      Human immunodeficiency virus (HIV) disease (H)      Hx of squamous cell carcinoma 03/23/2007     Hypertension 2010     Impotence of organic origin      Increased prostate specific antigen  (PSA) velocity 2016    Awaiting bx on blood thinner     Mixed hyperlipidemia      Pulmonary HTN (H)     Mod     TIA (transient ischemic attack) 2016     Type 2 diabetes mellitus (H) age 50       Past Surgical History   I have reviewed this patient's surgical history and updated it with pertinent information if needed.  Past Surgical History:   Procedure Laterality Date     ANGIOGRAM  16    No culprit lesions, stents widely patent      ANGIOGRAM  16    Cutting balloon ptca=Diag     APPENDECTOMY       CHOLECYSTECTOMY, LAPOROSCOPIC       COLON SURGERY       COLOSTOMY  1999    Temporary for diverticulitis     HC LEFT HEART CATHETERIZATION  2018    No significant change  Elevated LVEDp  LVEF 30% No PCI     HEART CATH, ANGIOPLASTY  16    LAD PCI. Stented with a 3.0 x 8 mm Xience Alpine stent.     IR DIALYSIS FISTULOGRAM LEFT  2019     STENT, CORONARY, S660 15/18  2015    VANITA=Diag, PTCA=LAD     STENT, CORONARY, S660 15/18  2015    VANITA=LAD       Prior to Admission Medications   Prior to Admission Medications   Prescriptions Last Dose Informant Patient Reported? Taking?   Acetaminophen (TYLENOL PO)  Self Yes No   Sig: Take 325 mg by mouth every 6 hours as needed for mild pain or fever    B COMPLEX-C-FOLIC ACID PO  Self Yes No   Sig: Take 1 tablet by mouth At Bedtime    CLONAZEPAM PO  Self Yes No   Sig: Take 0.5 mg by mouth nightly as needed for anxiety (restless legs)   CLOPIDOGREL BISULFATE PO  Self Yes No   Sig: Take 75 mg by mouth every evening    DOXERCALCIFEROL IV  Self Yes No   Sig: Inject 6 mcg into the vein three times a week (with dialysis)   Darunavir Ethanolate (PREZISTA PO)  Self Yes No   Sig: Take 800 mg by mouth At Bedtime.   Isosorbide Mononitrate  MG TB24  Self No No   Sig: Take 1 tablet (120 mg) by mouth every evening   MAGNESIUM OXIDE PO  Self Yes No   Sig: Take 400 mg by mouth At Bedtime   Nutritional Supplements (NEPRO PO)  Self Yes No   Si-3  times daily   abacavir (ZIAGEN) 300 MG tablet  Self Yes No   Sig: Take 600 mg by mouth every evening    albuterol (PROAIR HFA/PROVENTIL HFA/VENTOLIN HFA) 108 (90 BASE) MCG/ACT Inhaler  Self No No   Sig: Inhale 2 puffs into the lungs every 6 hours as needed for shortness of breath / dyspnea or wheezing   aspirin EC 81 MG EC tablet  Self No No   Sig: Take 1 tablet (81 mg) by mouth daily   atorvastatin (LIPITOR) 40 MG tablet  Self Yes No   Sig: Take 40 mg by mouth At Bedtime   carvedilol (COREG) 12.5 MG tablet  Self No No   Sig: Take 1 tablet (12.5 mg) by mouth 2 times daily (with meals)   chlorhexidine (CHLORHEXIDINE) 0.12 % solution  Self Yes No   Sig: Rinse and gargle 15 ml by mouth twice a day as directed.   dapsone 100 MG tablet  Self Yes No   Sig: Take 100 mg by mouth At Bedtime    dolutegravir (TIVICAY) 50 MG tablet  Self Yes No   Sig: Take 50 mg by mouth At Bedtime   epoetin brittni (EPOGEN,PROCRIT) 51775 UNIT/ML injection  Self Yes No   Sig: Inject 11,000 Units Subcutaneous three times a week WITH DIALYSIS   gabapentin (NEURONTIN) 300 MG capsule   Yes No   Sig: Take 300 mg by mouth 2 times daily   guaiFENesin (MUCINEX) 600 MG 12 hr tablet   Yes No   Sig: Take 1,200 mg by mouth 2 times daily   hydrALAZINE (APRESOLINE) 25 MG tablet  Self Yes No   Sig: Take 1 tablet (25 mg) by mouth 3 times daily   imiquimod (ALDARA) 5 % cream  Self Yes No   Sig: Apply topically as needed   insulin glargine (LANTUS SOLOSTAR PEN) 100 UNIT/ML pen   Yes No   Sig: Inject 20 Units Subcutaneous 2 times daily   insulin lispro (HUMALOG PEN) 100 UNIT/ML pen   Yes No   Sig: Inject 12 Units Subcutaneous 3 times daily (before meals) And an additional 2 units for every 50 mg/dL over 150.   ipratropium - albuterol 0.5 mg/2.5 mg/3 mL (DUONEB) 0.5-2.5 (3) MG/3ML neb solution  Self No No   Sig: Take 1 vial (3 mLs) by nebulization every 6 hours as needed for shortness of breath / dyspnea or wheezing   irbesartan (AVAPRO) 300 MG tablet  Self No No  "  Sig: Take 1 tablet (300 mg) by mouth At Bedtime   iron sucrose (VENOFER) 20 MG/ML injection  Self Yes No   Sig: Inject 50 mg into the vein once a week WIth dialysis   ketoconazole (NIZORAL) 2 % cream  Self Yes No   Sig: Apply topically 2 times daily as needed   mirtazapine (REMERON) 15 MG tablet  Self Yes No   Sig: Take 15 mg by mouth At Bedtime   nitroglycerin (NITROSTAT) 0.4 MG SL tablet  Self No No   Sig: One tablet under the tongue every 5 minutes if needed for chest pain. May repeat every 5 minutes for a maximum of 3 doses in 15 minutes\"   omega-3 acid ethyl esters (LOVAZA) 1 G capsule  Self Yes No   Sig: Take 2 g by mouth 2 times daily   pantoprazole (PROTONIX) 40 MG EC tablet  Self Yes No   Sig: Take 40 mg by mouth daily   ritonavir (NORVIR) 100 MG capsule  Self Yes No   Sig: Take 1 capsule by mouth At Bedtime    sucroferric oxyhydroxide (VELPHORO) 500 MG CHEW chewable tablet  Self Yes No   Sig: Take 500 mg by mouth 3 times daily (with meals)      Facility-Administered Medications: None     Allergies   Allergies   Allergen Reactions     Calcium Acetate Other (See Comments)     Other reaction(s): Other, see comments  Pain in chest and back  Pain in chest area (sensitive skin)      Diagnostic X-Ray Materials Other (See Comments)     PN: renal failure     Lisinopril      Sulfa Drugs        Social History   I have reviewed this patient's social history and updated it with pertinent information if needed. Donald Raza  reports that he quit smoking about 16 years ago. His smoking use included cigarettes. He has a 82.00 pack-year smoking history. He has never used smokeless tobacco. He reports that he does not drink alcohol or use drugs.    Family History   I have reviewed this patient's family history and updated it with pertinent information if needed.   Family History   Problem Relation Age of Onset     Heart Disease Brother 40        CABG     Kidney Disease Sister      Hypertension Sister      Heart Disease " Brother         Dilated aorta       Review of Systems   The 10 point Review of Systems is negative other than noted in the HPI    Physical Exam   Temp: 98.5  F (36.9  C) Temp src: Oral BP: 106/46 Pulse: (!) 38 Heart Rate: (!) 42 Resp: 9 SpO2: 97 % O2 Device: Nasal cannula Oxygen Delivery: 3 LPM  Vital Signs with Ranges  Temp:  [98.5  F (36.9  C)] 98.5  F (36.9  C)  Pulse:  [37-46] 38  Heart Rate:  [39-57] 42  Resp:  [9-22] 9  BP: (106-156)/(46-86) 106/46  SpO2:  [89 %-98 %] 97 %  211 lbs 0 oz    Constitutional: Awake, alert, cooperative, no apparent distress, chronically ill appearing  Eyes: Conjunctiva and pupils examined and normal.  HEENT: Moist mucous membranes, normal dentition.  Respiratory: Clear to auscultation bilaterally, no crackles or wheezing.  Cardiovascular: bradycardic, regular rhythm, normal S1 and S2, and no murmur noted. No reproducible chest pain on palpation.  GI: Soft, non-distended, non-tender, normal bowel sounds.  Lymph/Hematologic: No anterior cervical or supraclavicular adenopathy.  Skin: No rashes, no cyanosis, no edema. Dialysis fistula present with bruit.  Musculoskeletal: No joint swelling, erythema or tenderness.  Neurologic: Cranial nerves 2-12 intact, normal strength and sensation.  Psychiatric: Alert, oriented to person, place and time, no obvious anxiety or depression.    Data   Data reviewed today:  I personally reviewed CXR shows cardiomegaly, no infiltrates. EKG shows bradycardia with LBBB.  Recent Labs   Lab 05/02/19  0427   WBC 6.0   HGB 8.1*   MCV 94   *   INR 1.06   *   POTASSIUM 3.9   CHLORIDE 95   CO2 23   BUN 92*   CR 8.53*   ANIONGAP 14   PRABHA 9.7   *   ALBUMIN 4.0   PROTTOTAL 8.0   BILITOTAL 0.5   ALKPHOS 174*   ALT 17   AST 11   TROPI 0.016       Recent Results (from the past 24 hour(s))   XR Chest Port 1 View    Narrative    XR CHEST PORT 1 VW  5/2/2019 4:59 AM     INDICATION: Chest pain.    COMPARISON: 3/1/2019.      Impression    IMPRESSION:  Shallow inspiration. Enlarged cardiac silhouette. No focal  air-space disease or other acute findings.

## 2019-05-02 NOTE — PLAN OF CARE
Admitted from ER for CP.  Denies chest pain on admit. HR's in the 30's asymptomatic, Tele SB with 1st degree AVB, BBB and PVC's.  BP's stable.  Creat 8.53, dialysis patient Monday, Tuesday, Thursday and saturdays.  [facility]  Plan for cards consult.  A/Ox4.

## 2019-05-02 NOTE — PLAN OF CARE
A/O x 4, bradycardic in 30s-40s, asymptomatic, patient presented with chest pain, shortness of breath and lightheadedness, Cr 8.53, is a dialysis patient, dialyzes B-R-Ty-Saturday, patient was dialyzed today, 1.1 liters pulled out, patient has been very braycardic, EP saw , pace maker was recommended, patient down at cathlab for pace maker implantation.

## 2019-05-02 NOTE — UTILIZATION REVIEW
"Inpatient to Observation note:    Admission Status; Secondary Review Determination         Under the authority of the Utilization Management Committee, the utilization review process indicated a secondary review on the above patient.  The review outcome is based on review of the medical records, discussions with staff, and applying clinical experience noted on the date of the review.          (x) Observation Status Appropriate - This patient does not meet hospital inpatient criteria and is placed in observation status. If this patient's primary payer is Medicare and was admitted as an inpatient, Condition Code 44 should be used and patient status changed to \"observation\".     RATIONALE FOR DETERMINATION      69-year-old male with history including but not limited to coronary artery disease, end-stage renal disease on hemodialysis, COPD, HIV, hypertension presented to the emergency room with chest pain.  En route he was given nitroglycerin followed by atropine for low heart rate.  Reportedly heart rate was in the 40s at that time.  Patient is on Coreg at home, 12.5 mg twice a day.  Likely his heart rate will be monitored off beta-blockers.  Work-up for chest pain thus far shows negative troponin.  Heart rate has improved.  Patient is being monitored on telemetry.  Cardiology has been consulted for further evaluation.  Patient was admitted this morning, do not anticipate 2 midnight stay for work-up and evaluation to be completed.    The severity of illness, intensity of service provided, expected LOS and risk for adverse outcome make the care appropriate for further observation; however, doesn't meet criteria for hospital inpatient admission. Dr Pacheco notified and agrees with this determination.    This document was produced using voice recognition software.      The information on this document is developed by the utilization review team in order for the business office to ensure compliance.  This only denotes the " appropriateness of proper admission status and does not reflect the quality of care rendered.         The definitions of Inpatient Status and Observation Status used in making the determination above are those provided in the CMS Coverage Manual, Chapter 1 and Chapter 6, section 70.4.      Sincerely,  Shukri Byrnes MD    Utilization Review  Physician Advisor  [facility].

## 2019-05-02 NOTE — PROGRESS NOTES
Received report from ED. Paged Dr. Mooney regarding admitting status to CCU. Dr. Mooney wants patient admitted to [facility].

## 2019-05-02 NOTE — PHARMACY-ADMISSION MEDICATION HISTORY
Admission medication history interview status for the 5/2/2019  admission is complete. See EPIC admission navigator for prior to admission medications     Medication history source reliability:Good    Actions taken by pharmacist (provider contacted, etc): spoke to pt and reviewed medication management note in Care Everywhere    Additional medication history information not noted on PTA med list :None    Medication reconciliation/reorder completed by provider prior to medication history? No    Time spent in this activity: 20 minutes    Prior to Admission medications    Medication Sig Last Dose Taking? Auth Provider   abacavir (ZIAGEN) 300 MG tablet Take 600 mg by mouth every evening  5/1/2019 at Unknown time Yes Abstract, Provider   Acetaminophen (TYLENOL PO) Take 325 mg by mouth every 6 hours as needed for mild pain or fever  prn med Yes Unknown, Entered By History   albuterol (PROAIR HFA/PROVENTIL HFA/VENTOLIN HFA) 108 (90 BASE) MCG/ACT Inhaler Inhale 2 puffs into the lungs every 6 hours as needed for shortness of breath / dyspnea or wheezing prn med Yes Nelson Worthy MD   aspirin EC 81 MG EC tablet Take 1 tablet (81 mg) by mouth daily 5/1/2019 at Unknown time Yes Clemencia Pal MD   atorvastatin (LIPITOR) 40 MG tablet Take 40 mg by mouth At Bedtime 5/1/2019 at Unknown time Yes Unknown, Entered By History   B COMPLEX-C-FOLIC ACID PO Take 1 tablet by mouth At Bedtime  5/1/2019 at Unknown time Yes Reported, Patient   carvedilol (COREG) 12.5 MG tablet Take 1 tablet (12.5 mg) by mouth 2 times daily (with meals) 5/1/2019 at x2 Yes Clemencia Pal MD   chlorhexidine (CHLORHEXIDINE) 0.12 % solution Rinse and gargle 15 ml by mouth twice a day as directed. prn med Yes Abstract, Provider   CLONAZEPAM PO Take 0.5 mg by mouth nightly as needed for anxiety (restless legs) prn med Yes Unknown, Entered By History   CLOPIDOGREL BISULFATE PO Take 75 mg by mouth every evening  5/1/2019 at Unknown time Yes Unknown,  Entered By History   dapsone 100 MG tablet Take 100 mg by mouth At Bedtime  5/1/2019 at Unknown time Yes Reported, Patient   Darunavir Ethanolate (PREZISTA PO) Take 800 mg by mouth At Bedtime. 5/1/2019 at Unknown time Yes Reported, Patient   dolutegravir (TIVICAY) 50 MG tablet Take 50 mg by mouth At Bedtime 5/1/2019 at Unknown time Yes Unknown, Entered By History   DOXERCALCIFEROL IV Inject 6 mcg into the vein three times a week (with dialysis)  Yes Reported, Patient   epoetin brittni (EPOGEN,PROCRIT) 49977 UNIT/ML injection Inject 11,000 Units Subcutaneous three times a week WITH DIALYSIS  Yes Unknown, Entered By History   gabapentin (NEURONTIN) 300 MG capsule Take 300 mg by mouth as needed May take after HD prn med Yes Unknown, Entered By History   gabapentin (NEURONTIN) 300 MG capsule Take 300 mg by mouth 2 times daily 5/1/2019 at x2 Yes Unknown, Entered By History   guaiFENesin (MUCINEX) 600 MG 12 hr tablet Take 1,200 mg by mouth 2 times daily as needed  prn med Yes Unknown, Entered By History   hydrALAZINE (APRESOLINE) 25 MG tablet Take 1 tablet (25 mg) by mouth 3 times daily 5/1/2019 at x3 Yes Lucita Downing MD   imiquimod (ALDARA) 5 % cream Apply topically as needed prn med Yes Reported, Patient   insulin aspart (NOVOLOG FLEXPEN) 100 UNIT/ML pen Inject 10 Units Subcutaneous 3 times daily (with meals) 5/1/2019 at x3 Yes Reported, Patient   insulin glargine (LANTUS SOLOSTAR PEN) 100 UNIT/ML pen Inject 20 Units Subcutaneous 2 times daily 5/1/2019 at x2 Yes Dot Ansari MD   ipratropium - albuterol 0.5 mg/2.5 mg/3 mL (DUONEB) 0.5-2.5 (3) MG/3ML neb solution Take 1 vial (3 mLs) by nebulization every 6 hours as needed for shortness of breath / dyspnea or wheezing prn med Yes Catrachito Rosado DO   irbesartan (AVAPRO) 300 MG tablet Take 1 tablet (300 mg) by mouth At Bedtime 5/1/2019 at Unknown time Yes Clemencia Pal MD   iron sucrose (VENOFER) 20 MG/ML injection Inject 50 mg into the vein once  "a week WIth dialysis  Yes Unknown, Entered By History   Isosorbide Mononitrate  MG TB24 Take 1 tablet (120 mg) by mouth every evening 5/1/2019 at Unknown time Yes Yuki Hinojosa APRN CNP   ketoconazole (NIZORAL) 2 % cream Apply topically daily  5/1/2019 at Unknown time Yes Reported, Patient   MAGNESIUM OXIDE PO Take 400 mg by mouth At Bedtime 5/1/2019 at Unknown time Yes Unknown, Entered By History   melatonin 5 MG tablet Take 5 mg by mouth At Bedtime 5/1/2019 at Unknown time Yes Reported, Patient   mirtazapine (REMERON) 15 MG tablet Take 15 mg by mouth At Bedtime 5/1/2019 at Unknown time Yes Reported, Patient   nitroglycerin (NITROSTAT) 0.4 MG SL tablet One tablet under the tongue every 5 minutes if needed for chest pain. May repeat every 5 minutes for a maximum of 3 doses in 15 minutes\" prn med Yes Lay Paz MD   Nutritional Supplements (NEPRO PO) 1-3 times daily  Yes Unknown, Entered By History   omega-3 acid ethyl esters (LOVAZA) 1 G capsule Take 2 g by mouth 2 times daily 5/1/2019 at x2 Yes Unknown, Entered By History   pantoprazole (PROTONIX) 40 MG EC tablet Take 40 mg by mouth daily 5/1/2019 at Unknown time Yes Unknown, Entered By History   polyethylene glycol (MIRALAX/GLYCOLAX) packet Take 1 packet by mouth daily as needed for constipation prn med Yes Reported, Patient   ritonavir (NORVIR) 100 MG capsule Take 1 capsule by mouth At Bedtime  5/1/2019 at Unknown time Yes Reported, Patient   sucroferric oxyhydroxide (VELPHORO) 500 MG CHEW chewable tablet Take 500 mg by mouth 3 times daily (with meals) 5/1/2019 at x3 Yes Unknown, Entered By History   Peri Leavitt, MeghannD    "

## 2019-05-02 NOTE — CONSULTS
[facility]    Cardiology Consultation     Date of Admission:  5/2/2019    Assessment & Plan     Symptomatic bradycardia with left bundle branch block and first-degree AV block, new  HIV  Diabetes mellitus  Coronary disease with PCI's in the past  Stable perfusion study December 2018  Coronary angiogram 2018 with stable coronary disease  End-stage renal disease on dialysis  Anemia  Hypertension    Recommendations    Have reviewed echocardiogram which was just performed.  Patient has normal LV systolic function.  Have discussed findings with electrophysiology and clinical case.  They agree that patient would benefit from permanent pacemaker.  He is currently n.p.o. and has finished dialysis.  Patient is in agreement and wishes to proceed if appropriate.  We will arrange for permanent pacemaker later today.      History of present illness    Patient is a pleasant 69-year-old male who had onset of shortness of breath and chest discomfort.  It is unclear the context of this pain.  He describes it more as a sharp stabbing sensation.  He was seen by EMS and was found to have a low heart rate.  He was given atropine and the dopamine as his blood pressure was quite low.  His symptoms of chest pain have resolved and not come back again.  His troponin is negative in the context of known chronic kidney disease.  Upon further questioning patient reports of dizziness and fatigue over the last 2 to 3 days.  He states that prior to this he has never had any symptoms like this at all.  Review his EKGs demonstrate normal sinus rhythm with narrow complex as late as March 2019 during an admission for anemia.  Review of his cardiac history note notable for PCI's in the past.  His most recent angiogram is from early 2018 where his stents were patent functional testing did not demonstrate any significant obstructive disease.  More recently had a myocardial perfusion study in December 2018 with no evidence of ischemia.  LV  systolic function is preserved.  Given his EKG findings which demonstrate bradycardia with new left bundle branch block and first-degree AV block and the fact that he is symptomatic we are asked to consult.  He was on a beta-blocker at home however this has been discontinued.  He has just finished his dialysis and has been n.p.o.        Kateryna Lau MD    Primary Care Physician   Aida Thomas      Patient Active Problem List   Diagnosis     Type 2 diabetes mellitus (H)     Human immunodeficiency virus (HIV) disease (HCC)     Allergic rhinitis     Impotence of organic origin     Huang disease     Renal insufficiency     Hypertension     Mixed hyperlipidemia     Organ transplant candidate     Acute chest pain     Coronary artery disease     Unstable angina (H)     ESRD (end stage renal disease) on dialysis (H)     Pulmonary hypertension (H)     Dilated aortic root (H)     Bradycardia     Anemia     Bilateral pneumonia     Diarrhea     AIDS (acquired immune deficiency syndrome) (H)     Anemia due to chronic kidney disease     Dialysis AV fistula malfunction (H)     Unstable angina pectoris (H)     Bronchitis     Pneumonia     Acute respiratory failure with hypoxia (H)     Acute pulmonary edema (H)     Respiratory failure (H)     Generalized weakness     TIA (transient ischemic attack)     Cerebral hypoperfusion, transient and thought primarily to low volume/BP assoc with dialysis     Expressive aphasia     Generalized muscle weakness     Sepsis (H)     COPD (chronic obstructive pulmonary disease) (H)     COPD exacerbation (H)     Dialysis patient (H)     Angina at rest (H)     Chest pain       Past Medical History   I have reviewed this patient's medical history and updated it with pertinent information if needed.   Past Medical History:   Diagnosis Date     Allergic rhinitis      Anemia due to chronic kidney disease 10/21/2011     CAD S/P percutaneous coronary angioplasty 6/15/2015     Cataract       CKD (chronic kidney disease)      Colon cancer (H)      COPD (chronic obstructive pulmonary disease) (H)      Depression      Dilated aortic root (H) 5/6/2016     Diverticulitis      ESRD (end stage renal disease) on dialysis (H) 5/6/2016     Gout      Human immunodeficiency virus (HIV) disease (H)      Hx of squamous cell carcinoma 03/23/2007     Hypertension 2010     Impotence of organic origin      Increased prostate specific antigen (PSA) velocity 08/08/2016    Awaiting bx on blood thinner     Mixed hyperlipidemia      Pulmonary HTN (H)     Mod     TIA (transient ischemic attack) 5/2016     Type 2 diabetes mellitus (H) age 50       Past Surgical History   I have reviewed this patient's surgical history and updated it with pertinent information if needed.  Past Surgical History:   Procedure Laterality Date     ANGIOGRAM  03-04-16    No culprit lesions, stents widely patent      ANGIOGRAM  05-06-16    Cutting balloon ptca=Diag     APPENDECTOMY  2000     CHOLECYSTECTOMY, LAPOROSCOPIC       COLON SURGERY       COLOSTOMY  09/30/1999    Temporary for diverticulitis     HC LEFT HEART CATHETERIZATION  03/12/2018    No significant change  Elevated LVEDp  LVEF 30% No PCI     HEART CATH, ANGIOPLASTY  08-18-16    LAD PCI. Stented with a 3.0 x 8 mm Xience Alpine stent.     IR DIALYSIS FISTULOGRAM LEFT  1/25/2019     STENT, CORONARY, S660 15/18  12/2015    VANITA=Diag, PTCA=LAD     STENT, CORONARY, S660 15/18  06/2015    VANITA=LAD       Prior to Admission Medications   Prior to Admission Medications   Prescriptions Last Dose Informant Patient Reported? Taking?   Acetaminophen (TYLENOL PO) prn med Self Yes Yes   Sig: Take 325 mg by mouth every 6 hours as needed for mild pain or fever    B COMPLEX-C-FOLIC ACID PO 5/1/2019 at Unknown time Self Yes Yes   Sig: Take 1 tablet by mouth At Bedtime    CLONAZEPAM PO prn med Self Yes Yes   Sig: Take 0.5 mg by mouth nightly as needed for anxiety (restless legs)   CLOPIDOGREL BISULFATE PO  2019 at Unknown time Self Yes Yes   Sig: Take 75 mg by mouth every evening    DOXERCALCIFEROL IV  Self Yes Yes   Sig: Inject 6 mcg into the vein three times a week (with dialysis)   Darunavir Ethanolate (PREZISTA PO) 2019 at Unknown time Self Yes Yes   Sig: Take 800 mg by mouth At Bedtime.   Isosorbide Mononitrate  MG  at Unknown time Self No Yes   Sig: Take 1 tablet (120 mg) by mouth every evening   MAGNESIUM OXIDE PO 2019 at Unknown time Self Yes Yes   Sig: Take 400 mg by mouth At Bedtime   Nutritional Supplements (NEPRO PO)  Self Yes Yes   Si-3 times daily   abacavir (ZIAGEN) 300 MG tablet 2019 at Unknown time Self Yes Yes   Sig: Take 600 mg by mouth every evening    albuterol (PROAIR HFA/PROVENTIL HFA/VENTOLIN HFA) 108 (90 BASE) MCG/ACT Inhaler prn med Self No Yes   Sig: Inhale 2 puffs into the lungs every 6 hours as needed for shortness of breath / dyspnea or wheezing   amoxicillin-clavulanate (AUGMENTIN) 500-125 MG tablet   No No   Sig: Take 1 tablet by mouth every evening for 7 days   aspirin EC 81 MG EC tablet 2019 at Unknown time Self No Yes   Sig: Take 1 tablet (81 mg) by mouth daily   atorvastatin (LIPITOR) 40 MG tablet 2019 at Unknown time Self Yes Yes   Sig: Take 40 mg by mouth At Bedtime   carvedilol (COREG) 12.5 MG tablet 2019 at x2 Self No Yes   Sig: Take 1 tablet (12.5 mg) by mouth 2 times daily (with meals)   chlorhexidine (CHLORHEXIDINE) 0.12 % solution prn med Self Yes Yes   Sig: Rinse and gargle 15 ml by mouth twice a day as directed.   dapsone 100 MG tablet 2019 at Unknown time Self Yes Yes   Sig: Take 100 mg by mouth At Bedtime    dolutegravir (TIVICAY) 50 MG tablet 2019 at Unknown time Self Yes Yes   Sig: Take 50 mg by mouth At Bedtime   epoetin brittni (EPOGEN,PROCRIT) 71080 UNIT/ML injection  Self Yes Yes   Sig: Inject 11,000 Units Subcutaneous three times a week WITH DIALYSIS   gabapentin (NEURONTIN) 300 MG capsule 2019 at x2  Yes  "Yes   Sig: Take 300 mg by mouth 2 times daily   gabapentin (NEURONTIN) 300 MG capsule prn med  Yes Yes   Sig: Take 300 mg by mouth as needed May take after HD   guaiFENesin (MUCINEX) 600 MG 12 hr tablet prn med  Yes Yes   Sig: Take 1,200 mg by mouth 2 times daily as needed    hydrALAZINE (APRESOLINE) 25 MG tablet 5/1/2019 at x3 Self Yes Yes   Sig: Take 1 tablet (25 mg) by mouth 3 times daily   imiquimod (ALDARA) 5 % cream prn med Self Yes Yes   Sig: Apply topically as needed   insulin aspart (NOVOLOG FLEXPEN) 100 UNIT/ML pen 5/1/2019 at x3  Yes Yes   Sig: Inject 10 Units Subcutaneous 3 times daily (with meals)   insulin glargine (LANTUS SOLOSTAR PEN) 100 UNIT/ML pen 5/1/2019 at x2  Yes Yes   Sig: Inject 20 Units Subcutaneous 2 times daily   ipratropium - albuterol 0.5 mg/2.5 mg/3 mL (DUONEB) 0.5-2.5 (3) MG/3ML neb solution prn med Self No Yes   Sig: Take 1 vial (3 mLs) by nebulization every 6 hours as needed for shortness of breath / dyspnea or wheezing   irbesartan (AVAPRO) 300 MG tablet 5/1/2019 at Unknown time Self No Yes   Sig: Take 1 tablet (300 mg) by mouth At Bedtime   iron sucrose (VENOFER) 20 MG/ML injection  Self Yes Yes   Sig: Inject 50 mg into the vein once a week WIth dialysis   ketoconazole (NIZORAL) 2 % cream 5/1/2019 at Unknown time Self Yes Yes   Sig: Apply topically daily    melatonin 5 MG tablet 5/1/2019 at Unknown time  Yes Yes   Sig: Take 5 mg by mouth At Bedtime   mirtazapine (REMERON) 15 MG tablet 5/1/2019 at Unknown time Self Yes Yes   Sig: Take 15 mg by mouth At Bedtime   nitroglycerin (NITROSTAT) 0.4 MG SL tablet prn med Self No Yes   Sig: One tablet under the tongue every 5 minutes if needed for chest pain. May repeat every 5 minutes for a maximum of 3 doses in 15 minutes\"   omega-3 acid ethyl esters (LOVAZA) 1 G capsule 5/1/2019 at x2 Self Yes Yes   Sig: Take 2 g by mouth 2 times daily   pantoprazole (PROTONIX) 40 MG EC tablet 5/1/2019 at Unknown time Self Yes Yes   Sig: Take 40 mg by " mouth daily   polyethylene glycol (MIRALAX/GLYCOLAX) packet prn med  Yes Yes   Sig: Take 1 packet by mouth daily as needed for constipation   ritonavir (NORVIR) 100 MG capsule 5/1/2019 at Unknown time Self Yes Yes   Sig: Take 1 capsule by mouth At Bedtime    sucroferric oxyhydroxide (VELPHORO) 500 MG CHEW chewable tablet 5/1/2019 at x3 Self Yes Yes   Sig: Take 500 mg by mouth 3 times daily (with meals)      Facility-Administered Medications: None     Current Facility-Administered Medications   Medication Dose Route Frequency     abacavir  600 mg Oral QPM     [START ON 5/3/2019] aspirin  81 mg Oral Daily     atorvastatin  40 mg Oral At Bedtime     carvedilol  12.5 mg Oral BID w/meals     clopidogrel  75 mg Oral QPM     dapsone  100 mg Oral At Bedtime     darunavir  800 mg Oral At Bedtime     dolutegravir  50 mg Oral At Bedtime     gabapentin  300 mg Oral BID     heparin  5,000 Units Subcutaneous Q12H     hydrALAZINE  25 mg Oral TID     insulin aspart  1-12 Units Subcutaneous Q4H     insulin glargine  15 Units Subcutaneous BID     irbesartan  300 mg Oral At Bedtime     isosorbide mononitrate  120 mg Oral QPM     melatonin  5 mg Oral At Bedtime     mirtazapine  15 mg Oral At Bedtime     pantoprazole  40 mg Oral Daily     ritonavir  100 mg Oral At Bedtime     Current Facility-Administered Medications   Medication Last Rate     Allergies   Allergies   Allergen Reactions     Calcium Acetate Other (See Comments)     Other reaction(s): Other, see comments  Pain in chest and back  Pain in chest area (sensitive skin)      Diagnostic X-Ray Materials Other (See Comments)     PN: renal failure     Lisinopril      Sulfa Drugs        Social History    reports that he quit smoking about 16 years ago. His smoking use included cigarettes. He has a 82.00 pack-year smoking history. He has never used smokeless tobacco. He reports that he does not drink alcohol or use drugs.    Family History   Family History   Problem Relation Age of  Onset     Heart Disease Brother 40        CABG     Kidney Disease Sister      Hypertension Sister      Heart Disease Brother         Dilated aorta       Review of Systems   The comprehensive 10 point Review of Systems is negative other than noted in the HPI or here.     Physical Exam   Vital Signs with Ranges  Temp:  [97.5  F (36.4  C)-98.5  F (36.9  C)] 97.5  F (36.4  C)  Pulse:  [37-50] 50  Heart Rate:  [39-68] 68  Resp:  [9-22] 20  BP: (105-159)/(45-86) 122/61  SpO2:  [89 %-100 %] 98 %  Wt Readings from Last 4 Encounters:   05/02/19 93.5 kg (206 lb 2.1 oz)   01/29/19 89.1 kg (196 lb 6.9 oz)   01/05/19 85.5 kg (188 lb 7.9 oz)   12/13/18 90.5 kg (199 lb 8.3 oz)     No intake/output data recorded.      Vitals: /61 (BP Location: Right arm)   Pulse 50   Temp 97.5  F (36.4  C) (Oral)   Resp 20   Wt 93.5 kg (206 lb 2.1 oz)   SpO2 98%   BMI 28.75 kg/m      Constitutional: Awake, alert, cooperative, no apparent distress, chronically ill appearing  Eyes: Conjunctiva and pupils examined and normal.  HEENT: Moist mucous membranes, normal dentition.  Respiratory: Clear to auscultation bilaterally, no crackles or wheezing.  Cardiovascular: bradycardic, regular rhythm, normal S1 and S2, and no murmur noted. No reproducible chest pain on palpation.  GI: Soft, non-distended, non-tender, normal bowel sounds.  Lymph/Hematologic: No anterior cervical or supraclavicular adenopathy.  Skin: No rashes, no cyanosis, no edema. Dialysis fistula present with bruit.  Musculoskeletal: No joint swelling, erythema or tenderness.  Neurologic: Cranial nerves 2-12 intact, normal strength and sensation.  Psychiatric: Alert, oriented to person, place and time, no obvious anxiety or depression          Recent Labs   Lab 05/02/19  0800 05/02/19  0427   TROPI 0.022 0.016       Recent Labs   Lab 05/02/19  0800 05/02/19  0427   WBC  --  6.0   HGB  --  8.1*   MCV  --  94   PLT  --  102*   INR  --  1.06   NA  --  132*   POTASSIUM  --  3.9    CHLORIDE  --  95   CO2  --  23   BUN  --  92*   CR  --  8.53*   GFRESTIMATED  --  6*   GFRESTBLACK  --  7*   ANIONGAP  --  14   PRABHA  --  9.7   GLC  --  245*   ALBUMIN  --  4.0   PROTTOTAL  --  8.0   BILITOTAL  --  0.5   ALKPHOS  --  174*   ALT  --  17   AST  --  11   TROPI 0.022 0.016     Recent Labs   Lab Test 09/02/18  0629 08/09/17  0818  07/23/12  0712   CHOL 127 125   < > 298*   HDL 26* 33*   < > 37*   LDL Cannot estimate LDL when triglyceride exceeds 400 mg/dL  48 30   < > Cannot estimate LDL when triglyceride exceeds 400 mg/dL   TRIG 447* 311*   < > 1541*   CHOLHDLRATIO  --   --   --  8.1*    < > = values in this interval not displayed.     Recent Labs   Lab 05/02/19  0427   WBC 6.0   HGB 8.1*   HCT 25.1*   MCV 94   *     No results for input(s): PH, PHV, PO2, PO2V, SAT, PCO2, PCO2V, HCO3, HCO3V in the last 168 hours.  No results for input(s): NTBNPI, NTBNP in the last 168 hours.  No results for input(s): DD in the last 168 hours.  No results for input(s): SED, CRP in the last 168 hours.  Recent Labs   Lab 05/02/19  0427   *     No results for input(s): TSH in the last 168 hours.  No results for input(s): COLOR, APPEARANCE, URINEGLC, URINEBILI, URINEKETONE, SG, UBLD, URINEPH, PROTEIN, UROBILINOGEN, NITRITE, LEUKEST, RBCU, WBCU in the last 168 hours.    Imaging:  Recent Results (from the past 48 hour(s))   XR Chest Port 1 View    Narrative    XR CHEST PORT 1 VW  5/2/2019 4:59 AM     INDICATION: Chest pain.    COMPARISON: 3/1/2019.      Impression    IMPRESSION: Shallow inspiration. Enlarged cardiac silhouette. No focal  air-space disease or other acute findings.    HORACE CARLIN MD       Echo:  No results found for this or any previous visit (from the past 4320 hour(s)).

## 2019-05-02 NOTE — PROGRESS NOTES
Dictated.    Successful dual-chamber pacemaker implantation ([employer]).    Programmed DDD 60/130 ppm.    No apparent complication.    EBL = 15 cc.      Plan:  - CXR and device interrogation in the am  - routine post-device precautions  - leave pressure dressing on x 24 hrs  - no heparin for at least 24 hrs please.

## 2019-05-02 NOTE — CONSULTS
[facility]    RENAL CONSULTATION NOTE    REFERRING MD:  Dr. Mooney    REASON FOR CONSULTATION:  ESKD, chest discomfort, bradycardia    DATE OF CONSULTATION: 05/02/19    SHORTHAND KEY FOR MY NOTES:  c = with, s = without, p = after, a = before, x = except, asx = asymptomatic, tx = transplant or treatment, sx = symptoms or symptomatic, cx = canceled or culture, rxn = reaction, yday = yesterday, nl = normal, abx = antibiotics, fxn = function, dx = diagnosis, dz = disease, m/h = melena/hematochezia, c/d/l/ha = cramping/dizziness/lightheadedness/headache, d/c = discharge or diarrhea/constipation, f/c/n/v = fevers/chills/nausea/vomiting, cp/sob = chest pain/shortness of breath, tbv = total body volume, rxn = reaction, tdc = tunneled dialysis catheter, pta = prior to admission, hd = hemodialysis, pd = peritoneal dialysis, hhd = home hemodialysis    HPI: Donald Raza is a 69 year old male c ESKD 2 HIVAN/HTN who dialyses MTRS via a LAF at the [facility] under the care of Dr. Chavez and was admitted on 5/2/2019 c chest discomfort and found to have symptomatic bradycardia.    Pt was at home when he developed chest tightness and pain and activated EMS.  He was found to be bradycardic and rec'd atropine and dopamine due to low HR/BP.  He rec'd some NTG and ASA and the pain subsided.  In the ER, he was found to have a new LBBB and a 1st degree block.    Today, on HD he doesn't look good.  He keeps his eyes closed while talking (which is not nl for him) and continues to feel some intermittent cp.  He is breathing ok c o2.  No f/c/n/v.      ROS:  A complete review of systems was performed and is negative x as noted above.    PMH:    Past Medical History:   Diagnosis Date     Allergic rhinitis      Anemia due to chronic kidney disease 10/21/2011     CAD S/P percutaneous coronary angioplasty 6/15/2015     Cataract      CKD (chronic kidney disease)      Colon cancer (H)      COPD (chronic  obstructive pulmonary disease) (H)      Depression      Dilated aortic root (H) 5/6/2016     Diverticulitis      ESRD (end stage renal disease) on dialysis (H) 5/6/2016     Gout      Human immunodeficiency virus (HIV) disease (H)      Hx of squamous cell carcinoma 03/23/2007     Hypertension 2010     Impotence of organic origin      Increased prostate specific antigen (PSA) velocity 08/08/2016    Awaiting bx on blood thinner     Mixed hyperlipidemia      Pulmonary HTN (H)     Mod     TIA (transient ischemic attack) 5/2016     Type 2 diabetes mellitus (H) age 50     PSH:    Past Surgical History:   Procedure Laterality Date     ANGIOGRAM  03-04-16    No culprit lesions, stents widely patent      ANGIOGRAM  05-06-16    Cutting balloon ptca=Diag     APPENDECTOMY  2000     CHOLECYSTECTOMY, LAPOROSCOPIC       COLON SURGERY       COLOSTOMY  09/30/1999    Temporary for diverticulitis     HC LEFT HEART CATHETERIZATION  03/12/2018    No significant change  Elevated LVEDp  LVEF 30% No PCI     HEART CATH, ANGIOPLASTY  08-18-16    LAD PCI. Stented with a 3.0 x 8 mm Xience Alpine stent.     IR DIALYSIS FISTULOGRAM LEFT  1/25/2019     STENT, CORONARY, S660 15/18  12/2015    VANITA=Diag, PTCA=LAD     STENT, CORONARY, S660 15/18  06/2015    VANITA=LAD     MEDICATIONS:      abacavir  600 mg Oral QPM     [START ON 5/3/2019] aspirin  81 mg Oral Daily     atorvastatin  40 mg Oral At Bedtime     carvedilol  12.5 mg Oral BID w/meals     clopidogrel  75 mg Oral QPM     dapsone  100 mg Oral At Bedtime     darunavir  800 mg Oral At Bedtime     dolutegravir  50 mg Oral At Bedtime     gabapentin  300 mg Oral BID     heparin  5,000 Units Subcutaneous Q12H     hydrALAZINE  25 mg Oral TID     insulin aspart  1-12 Units Subcutaneous Q4H     insulin glargine  15 Units Subcutaneous BID     irbesartan  300 mg Oral At Bedtime     isosorbide mononitrate  120 mg Oral QPM     melatonin  5 mg Oral At Bedtime     mirtazapine  15 mg Oral At Bedtime      pantoprazole  40 mg Oral Daily     ritonavir  100 mg Oral At Bedtime     ALLERGIES:    Allergies as of 2019 - Reviewed 2019   Allergen Reaction Noted     Calcium acetate Other (See Comments) 2017     Diagnostic x-ray materials Other (See Comments) 2012     Lisinopril  2012     Sulfa drugs  2012     FH:    Family History   Problem Relation Age of Onset     Heart Disease Brother 40        CABG     Kidney Disease Sister      Hypertension Sister      Heart Disease Brother         Dilated aorta     SH:    Social History     Socioeconomic History     Marital status:      Spouse name: Not on file     Number of children: Not on file     Years of education: Not on file     Highest education level: Not on file   Occupational History     Not on file   Social Needs     Financial resource strain: Not on file     Food insecurity:     Worry: Not on file     Inability: Not on file     Transportation needs:     Medical: Not on file     Non-medical: Not on file   Tobacco Use     Smoking status: Former Smoker     Packs/day: 2.00     Years: 41.00     Pack years: 82.00     Types: Cigarettes     Last attempt to quit:      Years since quittin.3     Smokeless tobacco: Never Used   Substance and Sexual Activity     Alcohol use: No     Alcohol/week: 0.0 oz     Drug use: No     Sexual activity: Not on file   Lifestyle     Physical activity:     Days per week: Not on file     Minutes per session: Not on file     Stress: Not on file   Relationships     Social connections:     Talks on phone: Not on file     Gets together: Not on file     Attends Restorationism service: Not on file     Active member of club or organization: Not on file     Attends meetings of clubs or organizations: Not on file     Relationship status: Not on file     Intimate partner violence:     Fear of current or ex partner: Not on file     Emotionally abused: Not on file     Physically abused: Not on file     Forced sexual  activity: Not on file   Other Topics Concern     Parent/sibling w/ CABG, MI or angioplasty before 65F 55M? Yes      Service Not Asked     Blood Transfusions Not Asked     Caffeine Concern Yes     Comment: 2 cups daily     Occupational Exposure Not Asked     Hobby Hazards Not Asked     Sleep Concern Yes     Stress Concern Not Asked     Weight Concern Not Asked     Special Diet No     Comment:  more proteins     Back Care Not Asked     Exercise Yes     Comment: Cardiac rehab      Bike Helmet Not Asked     Seat Belt Not Asked     Self-Exams Not Asked   Social History Narrative     Not on file     PHYSICAL EXAM:    /68 (BP Location: Right arm)   Pulse (P) 60   Temp 97.3  F (36.3  C) (Axillary)   Resp 20   Wt 93.5 kg (206 lb 2.1 oz)   SpO2 95%   BMI 28.75 kg/m      GENERAL: awake, alert, looks uncomfortable, eyes closed  HEENT:  normocephalic, no gross abnormalities; MMM, edentulous; pupils equal, EOMI, no scleral icterus or conj edema  CV: Reg, usman c 2/6 murmurs, no clicks, gallops, or rubs, no significant ble edema  RESP: CTA bilaterally c good efforts  GI: abd o/s/nt/nd, BS present; no masses  MUSCULOSKELETAL: extremities nl - no gross deformities noted  SKIN: no suspicious lesions or rashes, dry to touch  NEURO:  strength normal and symmetric  PSYCH: mood good, affect appropriate  LYMPH: no palpable ant/post cervical and supraclavicular adenopathy  OTHER:  Access - LAF    Pt seen on HD.  His BP dropped early in the run, so goal reduced to ~1L.  Good BFR via LAF.      LABS:      CBC RESULTS:     Recent Labs   Lab 05/02/19 0427   WBC 6.0   RBC 2.68*   HGB 8.1*   HCT 25.1*   *     BMP RESULTS:  Recent Labs   Lab 05/02/19 0427   *   POTASSIUM 3.9   CHLORIDE 95   CO2 23   BUN 92*   CR 8.53*   *   PRABHA 9.7     INR  Recent Labs   Lab 05/02/19 0427   INR 1.06      DIAGNOSTICS:  Personally reviewed CXR - cardiomegaly, no fluids; ECG - new LBBB    A/P:  Donald Raza is a 69 year  old male c ESKD who has chest discomfort, bradycardia, anemia.    1.  ESKD.  Pt runs 4x/wk and is due to run today.  His k is fine as is his volume.  A.  HD today.    2.  Bradycardia/cp.  It seems this is more likely related to heart block than CAD.  He doesn't look good and isn't talkative, which is unusual for him.  D/w Dr. Lau.  A.  Plans per Cards.    3.  Anemia.  Pt's hb is always on the low side.  No signs of bleeding.  A.  Follow hb, clinically.  B.  Continue EPO.    4.  HTN.  Controlled c multiple meds.  A.  Continue same meds/doses.    5.  FEN.  Electrolytes are ok.  A.  Dialysis diet when eating.    Thank you for this consultation. We will follow c you.  Please call if any questions.    Attestation:   I have reviewed today's relevant vital signs, notes, medications, labs and imaging.    Bipin Chaves MD  [facility] - Nephrology  [phone]

## 2019-05-02 NOTE — ED TRIAGE NOTES
Patient called EMS from home for chest pressure with associated dyspnea.  EMS reports patient was bradycardic en route, 40's wide complex rhythm.  H/o MI 6 months ago, 6 stents.  Dialysis patient.

## 2019-05-02 NOTE — PROGRESS NOTES
"  Dialysis Run:  Previous Hep B status of Patient on machine verified by me   Pt Arrived to acute  via bed  Time out taken  Consent signed  Pt ran for 2.5 hours on a K3 with a net fluid removal of 1.1L  A BF of 400 was maintained via LAVF which was cannulated with 15 gauge needles without difficulty  Hemostasis of needle sites achieved post run after 10 minutes Dressing dry and intact   Meds :Epo  No Heparin given during the run  Complications:None  Pt very anxious and fidgety at start of run   pt,\"Dont' feel good\"    Floor nurse Admininistered Ativan Pt slept thru most of run    No complaints of chest pain   Pt remains bradycardic and Irregular   Pt was seen by Dr Chaves during run  Aseptic prep both on and off procedure.  Procedure and Fluid and ESRD discussed and all questions answered with pt  VSS post run See EPIC for more details  Water detection monitor on floor during run  Pt dialyzes at [facility] M,T,TH,Sat  Blood volume 58L   Pre Weight 93.5   Post Weight 92.4  EDW 88.5?    Potassium   Date Value Ref Range Status   05/02/2019 3.9 3.4 - 5.3 mmol/L Final   ]  Hemoglobin   Date Value Ref Range Status   05/02/2019 8.1 (L) 13.3 - 17.7 g/dL Final   ]  Creatinine   Date Value Ref Range Status   05/02/2019 8.53 (H) 0.66 - 1.25 mg/dL Final   ]      [facility]  "

## 2019-05-02 NOTE — ED PROVIDER NOTES
"  History     Chief Complaint:  Chest Pain     HPI   Donald Raza is a 69 year old male on dialysis with a significant cardiac history, who arrives to the ED at 0416 by EMS with chest pain. The patient called EMS to bring him into the ED this morning after experiencing chest pain and shortness of breath while in his home. EMS reports that the patient experienced a MI 6 months ago. They also note that the patient's heart rate was in the 40s on the ride to the hospital and that his blood pressure was in the 140s, which was initially relieved by a nitroglycerin drip. EMS then notes that they were unable to maintain a steady blood pressure, so they instead gave him atropine and dopamine. The patient reports chest pain of \"5.5/10\" severity and that he does not usually experience this kind of pain. He also endorses shortness of breath, light headedness, constipation, and difficulty getting up and walking. He notes that he has had 6 stents previously, with the most recent last year. He denies nausea or vomiting. He states that he has not been around anyone else that has been sick recently.     Allergies:  Calcium acetate  Lisinopril  Sulfa    Medications:    The patient is currently on no regular medications.     Past Medical History:    Type II DM  TIA  Pulmonary HTN  HLD  HTN  AIDS  ESRD  Gout  Dilated aortic root  Diverticulitis  COPD  Colon cancer  CAD  Cataract  Huang disease    Past Surgical History:    Coronary angiography  Cholecystectomy  Colon surgery  Appendectomy  Dialysis fistulogram, left    Family History:    CABG  Kidney disease    Social History:  Former smoker: 2 ppd, 41 years, quit 2003  Negative for alcohol use.       Review of Systems   Constitutional: Negative for fever.   Respiratory: Positive for chest tightness and shortness of breath.    Cardiovascular: Positive for chest pain.   Gastrointestinal: Positive for constipation. Negative for nausea and vomiting.   Neurological: Positive for " light-headedness.   All other systems reviewed and are negative.      Physical Exam     Patient Vitals for the past 24 hrs:   BP Temp Temp src Pulse Heart Rate Resp SpO2 Weight   05/02/19 0618 105/53 98.2  F (36.8  C) Oral -- 63 18 98 % 93.5 kg (206 lb 2.1 oz)   05/02/19 0545 133/65 -- -- 50 51 19 98 % --   05/02/19 0531 -- -- -- -- (!) 44 22 98 % --   05/02/19 0530 124/69 -- -- (!) 44 -- -- -- --   05/02/19 0515 106/46 -- -- (!) 38 (!) 42 9 97 % --   05/02/19 0500 115/51 -- -- (!) 37 -- -- -- --   05/02/19 0459 -- -- -- -- (!) 40 16 97 % --   05/02/19 0457 114/52 -- -- (!) 37 (!) 39 19 98 % --   05/02/19 0449 -- -- -- -- (!) 41 12 96 % --   05/02/19 0448 109/51 -- -- (!) 41 -- -- -- --   05/02/19 0442 111/52 -- -- (!) 42 -- 22 97 % --   05/02/19 0435 125/60 -- -- (!) 46 (!) 47 11 98 % --   05/02/19 0428 -- -- -- -- 56 22 94 % --   05/02/19 0425 156/86 98.5  F (36.9  C) Oral -- 57 16 (!) 89 % 95.7 kg (211 lb)         Physical Exam  Constitutional: The patient is oriented to person, place, and time.   HENT:   Head: Atraumatic  Right Ear: Normal  Left Ear: Normal  Nose: Nose normal.   Mouth/Throat: Oropharynx is clear and moist. No erythema or exudate.   Eyes: Conjunctivae and EOM are normal. Pupils are equal, round, and reactive to light. No discharge  Neck: Normal range of motion. Neck supple.   Cardiovascular: Bradycardic.   Pulmonary/Chest: CTA bilaterally. No wheezes rale or rhonchi.  Abdominal: Soft. Non tender.  No masses   Musculoskeletal: No edema. No bony deformity. Normal range of motion  Dialysis fistula in right arm with good bruit.    Lymphadenopathy:     The patient has no cervical adenopathy.   Neurological: The patient is alert and oriented to person, place, and time. The patient has normal strength and normal reflexes. No cranial nerve deficit. Coordination normal.   Skin: Slightly pale. Skin is warm and dry. No rash noted. The patient is not diaphoretic.   Psychiatric: The patient has a normal mood  and affect.    Emergency Department Course   ECG:  ECG (4:24:34):  Rate 58 bpm. KS interval 408. QRS duration 174. QT/QTc 480/471. P-R-T axes * -1 129. Sinus brachycardia with 1st degree AV block. Left bundle branch block. Abnormal ECG. Inverted T waves V5-V6. Agree with computer interpretation. Changes noted above. Interpreted at 0435 by Nas Marr MD.    ECG (4:50:06):  Rate 41 bpm. KS interval 456. QRS duration 170. QT/QTc 566/466. P-R-T axes 39 -10 113. Marked sinus bradycardia with 1st degree AV block. Left bundle branch block. Abnormal ECG. T waves V5-V6 normalized. Agree with computer interpretation. Changes noted above. Interpreted at 0453 by Nas Marr MD.    Imaging:  XR Chest Port 1 View  Shallow inspiration. Enlarged cardiac silhouette. No focal  air-space disease or other acute findings.  As read by Radiology.    Laboratory:  CBC: HGB 8.1 (L),  (L) o/w WNL (WBC 6.0)  BMP:  (L), Glucose 245 (H), BUN 92 (H), Creatinine 8.53 (H), GFR Estimate 6 (L), Alkaline Phosphatase 174 (H)  Troponin: (0427) 0.016  INR: 1.06  ABO/Rh type and screen: ABO: O, RH positive, Antibody negative   Magnesium: 2.7 (H)    Interventions:  0431: Nitrostat 0.4 mg Sublingual  0443: Nitrostat 0.4 mg Sublingual  0444:  mg PO     Emergency Department Course:  0431: Nursing notes and vitals reviewed. I performed an exam of the patient as documented above.     Medicine administered as documented above. Blood drawn. This was sent to the lab for further testing, results above.    The patient was sent for a chest xray while in the emergency department, findings above.     0524: I rechecked the patient and discussed the results of his workup thus far.     0532:  I consulted with Dr. Mooney of the hospitalist services. They are in agreement to accept the patient for admission.    Findings and plan explained to the Patient who consents to admission. Discussed the patient with Dr. Mooney who will admit the patient  to a medical bed for further monitoring, evaluation, and treatment.    Impression & Plan    Medical Decision Making:  Donald Raza is a 69 year old male who presents with anterior chest pain and bradycardia. Initially, EKG showed a bundle branch block which appears to be new, as well as T wave inversions in V5-V6. Patient had received received nitroglycerin, atropine, and dopamine from EMS on arrival. He continues to have 5/10 of pain. He was given 2 more sublingual nitroglycerin, which completely resolved his discomfort. Repeated EKG showed continued left bundle branch block but normalization of T waves in V5-V6. Laboratory examination shows patient to be anemic with HGB of 8.1 which is a chronic problem for him. He does often get transfusion if he falls below 8. Creatinine is 8.5 which is consistent with his chronic kidney disease and need for dialysis today. Although his electrolytes appear normal, his potassium was at 3.9. The patient remains bradycardic but maintains his pressure. He has no return of chest pain. At this time, I feel admission is warranted. I spoke with Dr. Mooney of Hospitalist services and the patient will be admitted to [facility] for further evaluation and dialysis later today.     Critical Care time:  none    Diagnosis:    ICD-10-CM    1. Chest pain, unspecified type R07.9 ABO/Rh type and screen     Magnesium     Magnesium     CANCELED: Magnesium (Add on or recollect)     CANCELED: Platelet count   2. Anemia, unspecified type D64.9    3. Bradycardia R00.1        Disposition:  Admitted to Patria Tilley, am serving as a scribe at 4:31 AM on 5/2/2019 to document services personally performed by Nas Marr MD based on my observations and the provider's statements to me.       Patria Grimm  5/2/2019    EMERGENCY DEPARTMENT       Nas Marr MD  05/02/19 0710

## 2019-05-03 ENCOUNTER — APPOINTMENT (OUTPATIENT)
Dept: PHYSICAL THERAPY | Facility: CLINIC | Age: 69
DRG: 242 | End: 2019-05-03
Attending: HOSPITALIST
Payer: MEDICARE

## 2019-05-03 ENCOUNTER — APPOINTMENT (OUTPATIENT)
Dept: GENERAL RADIOLOGY | Facility: CLINIC | Age: 69
DRG: 242 | End: 2019-05-03
Attending: INTERNAL MEDICINE
Payer: MEDICARE

## 2019-05-03 DIAGNOSIS — Z95.0 CARDIAC PACEMAKER IN SITU: Primary | ICD-10-CM

## 2019-05-03 LAB
ALBUMIN SERPL-MCNC: 3.4 G/DL (ref 3.4–5)
ANION GAP SERPL CALCULATED.3IONS-SCNC: 13 MMOL/L (ref 3–14)
BUN SERPL-MCNC: 65 MG/DL (ref 7–30)
CALCIUM SERPL-MCNC: 9.5 MG/DL (ref 8.5–10.1)
CHLORIDE SERPL-SCNC: 99 MMOL/L (ref 94–109)
CO2 SERPL-SCNC: 25 MMOL/L (ref 20–32)
CREAT SERPL-MCNC: 7.22 MG/DL (ref 0.66–1.25)
ERYTHROCYTE [DISTWIDTH] IN BLOOD BY AUTOMATED COUNT: 17.2 % (ref 10–15)
GFR SERPL CREATININE-BSD FRML MDRD: 7 ML/MIN/{1.73_M2}
GLUCOSE BLDC GLUCOMTR-MCNC: 126 MG/DL (ref 70–99)
GLUCOSE BLDC GLUCOMTR-MCNC: 150 MG/DL (ref 70–99)
GLUCOSE BLDC GLUCOMTR-MCNC: 174 MG/DL (ref 70–99)
GLUCOSE BLDC GLUCOMTR-MCNC: 201 MG/DL (ref 70–99)
GLUCOSE BLDC GLUCOMTR-MCNC: 260 MG/DL (ref 70–99)
GLUCOSE SERPL-MCNC: 140 MG/DL (ref 70–99)
HCT VFR BLD AUTO: 22.4 % (ref 40–53)
HGB BLD-MCNC: 7.1 G/DL (ref 13.3–17.7)
MCH RBC QN AUTO: 30.5 PG (ref 26.5–33)
MCHC RBC AUTO-ENTMCNC: 31.7 G/DL (ref 31.5–36.5)
MCV RBC AUTO: 96 FL (ref 78–100)
PHOSPHATE SERPL-MCNC: 6.3 MG/DL (ref 2.5–4.5)
PLATELET # BLD AUTO: 98 10E9/L (ref 150–450)
POTASSIUM SERPL-SCNC: 4.2 MMOL/L (ref 3.4–5.3)
RBC # BLD AUTO: 2.33 10E12/L (ref 4.4–5.9)
SODIUM SERPL-SCNC: 137 MMOL/L (ref 133–144)
WBC # BLD AUTO: 5.2 10E9/L (ref 4–11)

## 2019-05-03 PROCEDURE — 36415 COLL VENOUS BLD VENIPUNCTURE: CPT | Performed by: HOSPITALIST

## 2019-05-03 PROCEDURE — 25000131 ZZH RX MED GY IP 250 OP 636 PS 637: Performed by: HOSPITALIST

## 2019-05-03 PROCEDURE — 85027 COMPLETE CBC AUTOMATED: CPT | Performed by: HOSPITALIST

## 2019-05-03 PROCEDURE — 25000132 ZZH RX MED GY IP 250 OP 250 PS 637: Performed by: HOSPITALIST

## 2019-05-03 PROCEDURE — 97110 THERAPEUTIC EXERCISES: CPT | Mod: GP | Performed by: PHYSICAL THERAPIST

## 2019-05-03 PROCEDURE — A9270 NON-COVERED ITEM OR SERVICE: HCPCS | Performed by: INTERNAL MEDICINE

## 2019-05-03 PROCEDURE — 99207 ZZC CDG-MDM COMPONENT: MEETS LOW - DOWN CODED: CPT | Performed by: INTERNAL MEDICINE

## 2019-05-03 PROCEDURE — 97161 PT EVAL LOW COMPLEX 20 MIN: CPT | Mod: GP | Performed by: PHYSICAL THERAPIST

## 2019-05-03 PROCEDURE — 40000986 XR CHEST 2 VW

## 2019-05-03 PROCEDURE — 80069 RENAL FUNCTION PANEL: CPT | Performed by: HOSPITALIST

## 2019-05-03 PROCEDURE — 00000146 ZZHCL STATISTIC GLUCOSE BY METER IP

## 2019-05-03 PROCEDURE — 93010 ELECTROCARDIOGRAM REPORT: CPT | Performed by: INTERNAL MEDICINE

## 2019-05-03 PROCEDURE — 25000132 ZZH RX MED GY IP 250 OP 250 PS 637: Performed by: INTERNAL MEDICINE

## 2019-05-03 PROCEDURE — 21000001 ZZH R&B HEART CARE

## 2019-05-03 PROCEDURE — 99232 SBSQ HOSP IP/OBS MODERATE 35: CPT | Performed by: INTERNAL MEDICINE

## 2019-05-03 PROCEDURE — 99225 ZZC SUBSEQUENT OBSERVATION CARE,LEVEL II: CPT | Performed by: INTERNAL MEDICINE

## 2019-05-03 PROCEDURE — 93005 ELECTROCARDIOGRAM TRACING: CPT

## 2019-05-03 PROCEDURE — G0378 HOSPITAL OBSERVATION PER HR: HCPCS

## 2019-05-03 PROCEDURE — A9270 NON-COVERED ITEM OR SERVICE: HCPCS | Performed by: HOSPITALIST

## 2019-05-03 PROCEDURE — 97116 GAIT TRAINING THERAPY: CPT | Mod: GP | Performed by: PHYSICAL THERAPIST

## 2019-05-03 PROCEDURE — 25000128 H RX IP 250 OP 636: Performed by: HOSPITALIST

## 2019-05-03 RX ORDER — NICOTINE POLACRILEX 4 MG
15-30 LOZENGE BUCCAL
Status: DISCONTINUED | OUTPATIENT
Start: 2019-05-03 | End: 2019-01-01 | Stop reason: HOSPADM

## 2019-05-03 RX ORDER — DEXTROSE MONOHYDRATE 25 G/50ML
25-50 INJECTION, SOLUTION INTRAVENOUS
Status: DISCONTINUED | OUTPATIENT
Start: 2019-05-03 | End: 2019-01-01 | Stop reason: HOSPADM

## 2019-05-03 RX ADMIN — PANTOPRAZOLE SODIUM 40 MG: 40 TABLET, DELAYED RELEASE ORAL at 10:58

## 2019-05-03 RX ADMIN — GABAPENTIN 300 MG: 300 CAPSULE ORAL at 22:26

## 2019-05-03 RX ADMIN — INSULIN GLARGINE 15 UNITS: 100 INJECTION, SOLUTION SUBCUTANEOUS at 22:26

## 2019-05-03 RX ADMIN — ABACAVIR SULFATE 600 MG: 300 TABLET, FILM COATED ORAL at 19:49

## 2019-05-03 RX ADMIN — RITONAVIR 100 MG: 100 TABLET, FILM COATED ORAL at 22:26

## 2019-05-03 RX ADMIN — MELATONIN 5 MG TABLET 5 MG: at 22:26

## 2019-05-03 RX ADMIN — HYDRALAZINE HYDROCHLORIDE 25 MG: 25 TABLET ORAL at 22:26

## 2019-05-03 RX ADMIN — CLOPIDOGREL BISULFATE 75 MG: 75 TABLET, FILM COATED ORAL at 19:49

## 2019-05-03 RX ADMIN — OXYCODONE HYDROCHLORIDE AND ACETAMINOPHEN 1 TABLET: 5; 325 TABLET ORAL at 19:49

## 2019-05-03 RX ADMIN — INSULIN ASPART 3 UNITS: 100 INJECTION, SOLUTION INTRAVENOUS; SUBCUTANEOUS at 22:25

## 2019-05-03 RX ADMIN — INSULIN ASPART 5 UNITS: 100 INJECTION, SOLUTION INTRAVENOUS; SUBCUTANEOUS at 13:21

## 2019-05-03 RX ADMIN — ATORVASTATIN CALCIUM 40 MG: 40 TABLET, FILM COATED ORAL at 22:26

## 2019-05-03 RX ADMIN — ISOSORBIDE MONONITRATE 120 MG: 60 TABLET, EXTENDED RELEASE ORAL at 19:49

## 2019-05-03 RX ADMIN — DARUNAVIR 800 MG: 800 TABLET, FILM COATED ORAL at 22:26

## 2019-05-03 RX ADMIN — HEPARIN SODIUM 5000 UNITS: 5000 INJECTION, SOLUTION INTRAVENOUS; SUBCUTANEOUS at 10:51

## 2019-05-03 RX ADMIN — INSULIN GLARGINE 15 UNITS: 100 INJECTION, SOLUTION SUBCUTANEOUS at 10:55

## 2019-05-03 RX ADMIN — CARVEDILOL 12.5 MG: 12.5 TABLET, FILM COATED ORAL at 18:41

## 2019-05-03 RX ADMIN — MIRTAZAPINE 15 MG: 15 TABLET, FILM COATED ORAL at 22:26

## 2019-05-03 RX ADMIN — IRBESARTAN 300 MG: 150 TABLET ORAL at 22:26

## 2019-05-03 RX ADMIN — DOLUTEGRAVIR SODIUM 50 MG: 50 TABLET, FILM COATED ORAL at 22:26

## 2019-05-03 RX ADMIN — CARVEDILOL 12.5 MG: 12.5 TABLET, FILM COATED ORAL at 10:56

## 2019-05-03 RX ADMIN — HYDRALAZINE HYDROCHLORIDE 25 MG: 25 TABLET ORAL at 10:59

## 2019-05-03 RX ADMIN — ASPIRIN 81 MG: 81 TABLET, COATED ORAL at 10:57

## 2019-05-03 RX ADMIN — INSULIN ASPART 1 UNITS: 100 INJECTION, SOLUTION INTRAVENOUS; SUBCUTANEOUS at 01:59

## 2019-05-03 RX ADMIN — DAPSONE 100 MG: 100 TABLET ORAL at 22:26

## 2019-05-03 RX ADMIN — INSULIN ASPART 2 UNITS: 100 INJECTION, SOLUTION INTRAVENOUS; SUBCUTANEOUS at 18:41

## 2019-05-03 RX ADMIN — HYDRALAZINE HYDROCHLORIDE 25 MG: 25 TABLET ORAL at 16:03

## 2019-05-03 RX ADMIN — GABAPENTIN 300 MG: 300 CAPSULE ORAL at 10:58

## 2019-05-03 RX ADMIN — HEPARIN SODIUM 5000 UNITS: 5000 INJECTION, SOLUTION INTRAVENOUS; SUBCUTANEOUS at 19:49

## 2019-05-03 NOTE — PLAN OF CARE
Pt more alert this AM, oriented x4. VSS on 2.5L NC. L/s diminished w/ coarse. Denies pain or chest pain. Right upper chest pacer site CDI. CMS intact. Tele occasional A-paced. On renal diet. , 126. Up Ax1-2. Plan for Chest XR this AM. Continue to monitor.

## 2019-05-03 NOTE — PROGRESS NOTES
05/03/19 0918   Quick Adds   Type of Visit Initial PT Evaluation   Living Environment   Lives With spouse   Living Arrangements house   Home Accessibility stairs to enter home;stairs within home   Number of Stairs, Main Entrance 2   Number of Stairs, Within Home, Primary 6   Stair Railings, Within Home, Primary railing on right side (ascending)   Self-Care   Usual Activity Tolerance good   Current Activity Tolerance fair   Regular Exercise No   Equipment Currently Used at Home cane, straight   Functional Level Prior   Ambulation 1-->assistive equipment   Transferring 1-->assistive equipment   Toileting 0-->independent   Bathing 1-->assistive equipment   Communication 0-->understands/communicates without difficulty   Swallowing 0-->swallows foods/liquids without difficulty   Cognition 0 - no cognition issues reported   Fall history within last six months no   Which of the above functional risks had a recent onset or change? none   Prior Functional Level Comment mod indep for mobility, crusing in home using SEC outside, family assist in getting up 2 steps into home.   General Information   Onset of Illness/Injury or Date of Surgery - Date 05/02/19   Referring Physician Green   Patient/Family Goals Statement return home   Pertinent History of Current Problem (include personal factors and/or comorbidities that impact the POC) Pt admitted with chest pain, s/p pacemake placement, PMH end staqe renal, DM HTN HIV COPD   Precautions/Limitations fall precautions   Cognitive Status Examination   Orientation orientation to person, place and time   Level of Consciousness lethargic/somnolent   Follows Commands and Answers Questions 75% of the time;able to follow single-step instructions   Personal Safety and Judgment impaired;at risk behaviors demonstrated   Cognitive Comment pt not very clear historian, stating he was going to OPPT then correcting that therapy came to the home   Posture    Posture Forward head  "position;Protracted shoulders   Range of Motion (ROM)   ROM Comment LE ROM is WFL   Strength   Strength Comments 4/5 grossly, functional weakness, mod A for sit to stand   Bed Mobility   Bed Mobility Comments min A   Transfer Skills   Transfer Comments mod A   Gait   Gait Comments min A w/ WW, dec step length, slow pace, easily fatigued   Balance   Balance Comments impaired, A to NBOS, LOB w/ stair toe taps   General Therapy Interventions   Planned Therapy Interventions balance training;gait training;strengthening;transfer training   Clinical Impression   Criteria for Skilled Therapeutic Intervention yes, treatment indicated   PT Diagnosis difficulty walking   Influenced by the following impairments weakness, impaired gait/balance   Functional limitations due to impairments impaired functional mobility   Clinical Presentation Evolving/Changing   Clinical Presentation Rationale multiple comorbities s/p    Clinical Decision Making (Complexity) Low complexity   Therapy Frequency` daily   Predicted Duration of Therapy Intervention (days/wks) 4 days   Anticipated Equipment Needs at Discharge front wheeled walker  (may have one at home, need to confirm w/ family)   Anticipated Discharge Disposition Transitional Care Facility   Risk & Benefits of therapy have been explained Yes   Patient, Family & other staff in agreement with plan of care Yes   [facility] [employer] \"6 Clicks\"   2016, Trustees of [facility], under license to [employer].  All rights reserved.   6 Clicks Short Forms Basic Mobility Inpatient Short Form   [facility] [employer]  \"6 Clicks\" V.2 Basic Mobility Inpatient Short Form   1. Turning from your back to your side while in a flat bed without using bedrails? 3 - A Little   2. Moving from lying on your back to sitting on the side of a flat bed without using bedrails? 3 - A Little   3. Moving to and from a bed to a chair (including a wheelchair)? 2 - A Lot   4. Standing up from a chair " using your arms (e.g., wheelchair, or bedside chair)? 2 - A Lot   5. To walk in hospital room? 3 - A Little   6. Climbing 3-5 steps with a railing? 2 - A Lot   Basic Mobility Raw Score (Score out of 24.Lower scores equate to lower levels of function) 15   Total Evaluation Time   Total Evaluation Time (Minutes) 15

## 2019-05-03 NOTE — PROGRESS NOTES
Device Discharge  Dressing:  Clean, dry, and intact  Chest XR reviewed:  Yes  Pneumo present?:  No  Lead Measurements per Device Rep:  WNL    Education Provided:  Avoid raising right arm above the level of the shoulder for 3 weeks.  Do not shower until outer occlusive dressing has been removed.  Remove outer dressing in 3 - 4 days.  Leave steri-strips in place, these will be removed at the 1 week check.   Call Device Clinic with increased swelling, drainage, or fever > 101 degrees.   Follow-up with the Device Clinic as scheduled.

## 2019-05-03 NOTE — PLAN OF CARE
A&OX4, forgetful at times, lethargic for most of the shift. Prn Tylenol given for left shoulder pain and headache. Pacer site right upper chest is CDI. 'S 130'S, Sats  90-95% on 2.5 NSC.  & 185. Dangled at bedside.

## 2019-05-03 NOTE — PLAN OF CARE
PT:  Discharge Planner PT   Patient plan for discharge: Home  Current status: Pt at baseline uses SEC outside of the home, he reports cruising the furniture inside, pt stating WW is available but he does not use. Pts house is split level with 6 steps to living areas, there are 2 steps w/o railing to enter the home. Pt lives with spouse, possibly additional family members, pt not providing clear history, but did state family assists to help him up the steps to enter the home. Family transports to/from dialysis  Pt presently requiring min A for bed mobility and mod A for sit to stand transfers w/o AD, min A w/ WW. Pt ambulated 75 ft x 2 w/ WW and min A, slow pace with decreased step length, trial w/o WW pt required mod A for balance, with very unsteady gait and moderate pathway deviation. Pt negotiated 2 steps with dual HR, attempted steps w/o rails resulting in LOB with assist to correct. Pt unable to complete formal balance assessment due to fatigue, but had LOB x 2 with toe taps onto a step. Pt easily fatigue,needing cues to keep eyes open during mobility, SaO2 90% at rest, dropping to 86% with activity on RA, pt limited by decreased strength, impaired gait and balance and decreased functional endurance.    Barriers to return to prior living situation: A x 1 for all mobility, stairs to enter at home, stairs w/in home, falls risk  Recommendations for discharge: TCU  Rationale for recommendations: Pt requiring min to mod A for mobility demonstrating need for more supportive assistive device but appears to have decreased insight into present deficits. Given present level of function, pt would not be safe to return home. Pt admitted under observation should pt/family refuse TCU recommendation pt would required home w/ 24/7 assist and home PT/OT. Should pt progress with therapy and demonstrate improved safety and less A with mobiliy, need for assist at home will be amended.     Entered by: Ciara Whyte 05/03/2019  10:06 AM

## 2019-05-03 NOTE — PROGRESS NOTES
[facility]  Patient is currently open to home care services with [facility]. The patient is currently receiving Skilled Nursing services. [facility]  and team have been notified that patient is under OBSERVATION STATUS. [facility] liaison will continue to follow patient during stay. If patient is admitted to inpatient status please provide orders to resume home care at time of discharge if appropriate.

## 2019-05-03 NOTE — DISCHARGE SUMMARY
[facility]  Hospitalist Discharge Summary       Date of Admission:  5/2/2019  Date of Discharge:  5/6/2019  Discharging Provider: Manuelito Pacheco DO      Discharge Diagnoses   1. Symptomatic bradycardia due to 2nd degree AV block with LBBB   2. H/o CAD   3. HTN   4. ESRD on HD   5. Acute on chronic anemia of chronic diseaes  6. DM type II   7. HIV   8. COPD, not in exacerbation     Follow-ups Needed After Discharge   Follow-up Appointments     Follow-up and recommended labs and tests       Follow up with primary care provider, Aida Thomas, within 2-4   weeks, for hospital follow- up. No follow up labs or test are needed.  Follow up with Device clinic and EP as planned  Patient should follow up with outpatient GI at [facility] in the next   4-6 weeks  Dialysis as previously scheduled             Unresulted Labs Ordered in the Past 30 Days of this Admission     No orders found from 3/3/2019 to 5/3/2019.          Discharge Disposition   Discharged to home  Condition at discharge: Stable    Hospital Course   Admitted for symptomatic bradycardia.  Seen by EP and underwent PPM yesterday.  No complications and on re-evaluation pacemaker appears to be working well.  Patient does complain of some fatigue post-procedure.  He was evaluated by PT who recommended TCU but patient refused stating he had all the help he needed at home.  We will continue his home health and PT as previously ordered.  Discharged to home in stable condition with follow up.     While here patient's hemoglobin dipped down to 6.7.  He did receive 1 unit of PRBCs and was seen by GI.  They did not feel this was due to an acute GI bleed and most likely due to his known ESRD.  Recommended outpatient follow up with his primary GI service at [facility]. Further hemoglobins were stable.     Consultations This Hospital Stay   CARDIOLOGY IP CONSULT  NEPHROLOGY IP CONSULT  PHYSICAL THERAPY ADULT IP CONSULT  CARE TRANSITION RN/SW IP  CONSULT  GASTROENTEROLOGY IP CONSULT    Code Status   Full Code    Time Spent on this Encounter   I, Manuelito VARGHESEJoie Pacheco, personally saw the patient today and spent greater than 30 minutes discharging this patient.       Manuelito Pacheco, DO  [facility]  ______________________________________________________________________    Physical Exam   Vital Signs: Temp: 98.4  F (36.9  C) Temp src: Oral BP: 157/67   Heart Rate: 67 Resp: 16 SpO2: 90 % O2 Device: None (Room air) Oxygen Delivery: 3 LPM  Weight: 207 lbs 14.4 oz  General Appearance: Resting comfortably in chair.  NAD   Respiratory: Clear to auscultation.  No respiratory distress  Cardiovascular: RRR.  No murmurs  GI: Bowel sounds present.  No tenderness  Skin: No rashes.   No cyanosis  Other: No edema        Primary Care Physician   Aida Thomas    Discharge Orders      Discharge Order: F/U with Cardiac  [facility]      Home care nursing referral      Home Care PT Referral for Hospital Discharge      Reason for your hospital stay    Bradycardia     Activity    Your activity upon discharge: activity as tolerated     Follow-up and recommended labs and tests     Follow up with primary care provider, Aida Thomas, within 2-4 weeks, for hospital follow- up. No follow up labs or test are needed.  Follow up with Device clinic and EP as planned  Patient should follow up with outpatient GI at [facility] in the next 4-6 weeks  Dialysis as previously scheduled     Diet    Follow this diet upon discharge: Orders Placed This Encounter      Snacks/Supplements Adult: Boost Shake; Between Meals      Room Service      Dialysis Diet       Significant Results and Procedures   Most Recent 3 CBC's:  Recent Labs   Lab Test 05/06/19  0546 05/05/19  1221 05/05/19  0553  05/04/19  0605 05/03/19  0556   WBC 4.8  --   --   --  5.3 5.2   HGB 7.8* 8.0* 8.0*   < > 6.7* 7.1*   MCV 95  --   --   --  95 96   *  --  94*  --  97* 98*    < > = values in this interval  not displayed.     Most Recent 3 BMP's:  Recent Labs   Lab Test 05/06/19  0546 05/04/19  0605 05/03/19  0556   * 133 137   POTASSIUM 4.7 4.5 4.2   CHLORIDE 92* 95 99   CO2 26 25 25   BUN 76* 89* 65*   CR 8.69* 9.13* 7.22*   ANIONGAP 12 13 13   PRABHA 9.2 9.8 9.5   * 217* 140*   ,   Results for orders placed or performed during the hospital encounter of 05/02/19   XR Chest Port 1 View    Narrative    XR CHEST PORT 1 VW  5/2/2019 4:59 AM     INDICATION: Chest pain.    COMPARISON: 3/1/2019.      Impression    IMPRESSION: Shallow inspiration. Enlarged cardiac silhouette. No focal  air-space disease or other acute findings.    HORACE CARLIN MD   X-ray Chest 2 vws*    Narrative    CHEST TWO VIEWS   5/3/2019 7:45 AM     HISTORY: Evaluate for pneumothorax post cardiac device placement.    COMPARISON: 5/2/2019.    FINDINGS: There is a right subclavian cardiac device in place with  lead tips in the region of the right atrium and right ventricle. There  is no pneumothorax. The heart size is normal. Thoracic aorta is  calcified. The lungs are clear. No pleural effusion.       Impression    IMPRESSION: No pneumothorax post cardiac device placement.       Discharge Medications   Current Discharge Medication List      CONTINUE these medications which have NOT CHANGED    Details   abacavir (ZIAGEN) 300 MG tablet Take 600 mg by mouth every evening       Acetaminophen (TYLENOL PO) Take 325 mg by mouth every 6 hours as needed for mild pain or fever       albuterol (PROAIR HFA/PROVENTIL HFA/VENTOLIN HFA) 108 (90 BASE) MCG/ACT Inhaler Inhale 2 puffs into the lungs every 6 hours as needed for shortness of breath / dyspnea or wheezing  Qty: 1 Inhaler, Refills: 1    Associated Diagnoses: Cough      aspirin EC 81 MG EC tablet Take 1 tablet (81 mg) by mouth daily  Qty: 30 tablet, Refills: 0    Associated Diagnoses: Coronary artery disease, angina presence unspecified, unspecified vessel or lesion type, unspecified whether  native or transplanted heart      atorvastatin (LIPITOR) 40 MG tablet Take 40 mg by mouth At Bedtime      B COMPLEX-C-FOLIC ACID PO Take 1 tablet by mouth At Bedtime       carvedilol (COREG) 12.5 MG tablet Take 1 tablet (12.5 mg) by mouth 2 times daily (with meals)  Qty: 60 tablet, Refills: 0    Associated Diagnoses: Hypertension secondary to other renal disorders      chlorhexidine (CHLORHEXIDINE) 0.12 % solution Rinse and gargle 15 ml by mouth twice a day as directed.      CLONAZEPAM PO Take 0.5 mg by mouth nightly as needed for anxiety (restless legs)      CLOPIDOGREL BISULFATE PO Take 75 mg by mouth every evening       dapsone 100 MG tablet Take 100 mg by mouth At Bedtime       Darunavir Ethanolate (PREZISTA PO) Take 800 mg by mouth At Bedtime.      dolutegravir (TIVICAY) 50 MG tablet Take 50 mg by mouth At Bedtime      DOXERCALCIFEROL IV Inject 6 mcg into the vein three times a week (with dialysis)      epoetin brittni (EPOGEN,PROCRIT) 17401 UNIT/ML injection Inject 11,000 Units Subcutaneous three times a week WITH DIALYSIS      !! gabapentin (NEURONTIN) 300 MG capsule Take 300 mg by mouth as needed May take after HD      !! gabapentin (NEURONTIN) 300 MG capsule Take 300 mg by mouth 2 times daily      guaiFENesin (MUCINEX) 600 MG 12 hr tablet Take 1,200 mg by mouth 2 times daily as needed       hydrALAZINE (APRESOLINE) 25 MG tablet Take 1 tablet (25 mg) by mouth 3 times daily  Qty: 90 tablet, Refills: 0    Comments: Future refills by PCP Dr. Aiad Thomas with phone number [phone].  Associated Diagnoses: Hypertension secondary to other renal disorders      imiquimod (ALDARA) 5 % cream Apply topically as needed      insulin aspart (NOVOLOG FLEXPEN) 100 UNIT/ML pen Inject 10 Units Subcutaneous 3 times daily (with meals)      insulin glargine (LANTUS SOLOSTAR PEN) 100 UNIT/ML pen Inject 20 Units Subcutaneous 2 times daily  Qty: 12 mL, Refills: 0      ipratropium - albuterol 0.5 mg/2.5 mg/3 mL (DUONEB)  "0.5-2.5 (3) MG/3ML neb solution Take 1 vial (3 mLs) by nebulization every 6 hours as needed for shortness of breath / dyspnea or wheezing  Qty: 90 vial, Refills: 1    Associated Diagnoses: Acute respiratory failure with hypoxia (H)      irbesartan (AVAPRO) 300 MG tablet Take 1 tablet (300 mg) by mouth At Bedtime  Qty: 30 tablet, Refills: 0    Associated Diagnoses: Hypertension secondary to other renal disorders      iron sucrose (VENOFER) 20 MG/ML injection Inject 50 mg into the vein once a week WIth dialysis      Isosorbide Mononitrate  MG TB24 Take 1 tablet (120 mg) by mouth every evening  Qty: 30 tablet, Refills: 11    Associated Diagnoses: Coronary artery disease involving native heart, angina presence unspecified, unspecified vessel or lesion type; Hypertension secondary to other renal disorders      ketoconazole (NIZORAL) 2 % cream Apply topically daily       MAGNESIUM OXIDE PO Take 400 mg by mouth At Bedtime      melatonin 5 MG tablet Take 5 mg by mouth At Bedtime      mirtazapine (REMERON) 15 MG tablet Take 15 mg by mouth At Bedtime      nitroglycerin (NITROSTAT) 0.4 MG SL tablet One tablet under the tongue every 5 minutes if needed for chest pain. May repeat every 5 minutes for a maximum of 3 doses in 15 minutes\"  Qty: 25 tablet, Refills: 3    Associated Diagnoses: Coronary artery disease due to lipid rich plaque; Status post coronary angioplasty      Nutritional Supplements (NEPRO PO) 1-3 times daily      omega-3 acid ethyl esters (LOVAZA) 1 G capsule Take 2 g by mouth 2 times daily      pantoprazole (PROTONIX) 40 MG EC tablet Take 40 mg by mouth daily      polyethylene glycol (MIRALAX/GLYCOLAX) packet Take 1 packet by mouth daily as needed for constipation      ritonavir (NORVIR) 100 MG capsule Take 1 capsule by mouth At Bedtime       sucroferric oxyhydroxide (VELPHORO) 500 MG CHEW chewable tablet Take 500 mg by mouth 3 times daily (with meals)       !! - Potential duplicate medications found. " Please discuss with provider.      STOP taking these medications       amoxicillin-clavulanate (AUGMENTIN) 500-125 MG tablet Comments:   Reason for Stopping:             Allergies   Allergies   Allergen Reactions     Calcium Acetate Other (See Comments)     Other reaction(s): Other, see comments  Pain in chest and back  Pain in chest area (sensitive skin)      Diagnostic X-Ray Materials Other (See Comments)     PN: renal failure     Lisinopril      Sulfa Drugs

## 2019-05-03 NOTE — DISCHARGE INSTRUCTIONS
Discharge Instructions for Pacemaker Implantation  You have had a procedure to insert a pacemaker. Once inside your body, this small electronic device helps keep your heart from beating too slowly. A pacemaker can t fix existing heart problems. But it can help you feel better and have more energy. As you recover, follow all of the instructions you are given, including those below.  Activity    Follow the instructions you are given about limiting your activity.    Do not raise your arm on the incision side above shoulder level for 3 weeks. This gives the device lead wires time to attach securely inside your heart.    Ask your doctor when you can expect to return to work.    You can still exercise. It s good for your body and your heart. Talk with your doctor about an exercise plan.  Other Precautions    Follow your doctor's directions carefully for wound care. You may remove the outer dressing in 3 - 4 days. Leave the steri-strips in place; these will be removed at your 1 week follow-up. Never put any creams, lotions, or products like peroxide on an incision unless your doctor tells you to.     You may shower after the outer dressing is removed.     Before you receive any treatment, tell all health care providers (including your dentist) that you have a pacemaker.    You will be given an ID card that contains information about your pacemaker. Always carry this card with you. You can show this card if your pacemaker sets off a metal detector. You should also show it to avoid screening with a hand-held security wand.    Keep your cell phone away from your pacemaker. Don t carry the phone in your shirt pocket, even when it s turned off.    Avoid strong magnets. Examples are those used in MRIs or in hand-held security wands.    Avoid strong electrical fields. Examples are those made by radio transmitting towers,  ham  radios, and heavy-duty electrical equipment.    Avoid leaning over the open hernandez of a running car. A  running engine creates an electrical field. Most household and yard appliances will not cause any problems. If you use any large power tools, such as an industrial , talk with your doctor.   Follow-Up    Follow up in the device clinic as scheduled.    Device check scheduled for 5/10/19 at 11:00am. [facility] [place]. [address], [address]. [place], [place].     Make regular follow-up appointments with your device clinic. He or she will check the pacemaker to make sure it s working properly.  When to Call Your Device clinic [phone]  Call your doctor immediately if you have any of the following:    Dizziness    Chest pain    Lack of energy    Fainting spells    Rapid pulse or pounding heartbeat    Shortness of breath    Increased pain around your pacemaker    Fever above 100.4 F (38 C) or other signs of infection (redness, swelling, drainage, or warmth at the incision site)     2151-7809 [employer]. [address] [place] [address], [place] [address]. All rights reserved. This information is not intended as a substitute for professional medical care. Always follow your healthcare professional's instructions.

## 2019-05-03 NOTE — PROGRESS NOTES
[facility]    Medicine Progress Note - Hospitalist Service       Date of Admission:  5/2/2019  Assessment & Plan   Donald Raza is a 69 year old male who presents with chest pain, noted to have bradycardia while en route with EMS, symptoms resolved while in ED.     Symptomatic bradycardia in setting of 2nd degree AV block s/p PPM on 5/2/19   Coronary artery disease with history of PCI  New left bundle branch block  Has undergone workup in the past, Lexiscan 12/2018 had no areas of ischemia. Cardiac cath 3/2018 showed widely patent stents. Now with bradycardia into 40s noted with EMS and in ED. Received atropine in ambulance with no effect. Was briefly started on dopamine and discontinued. EKG showed left bundle branch block with inverted t waves V5-6, which corrected on repeat EKG after nitroglycerin given. Received full dose ASA in ED  - Continue aggressive medical management (coreg, ASA, statin, irbesartan, hydralazine, imdur, plavix)  - Cardiology consulted and appreciate their assistance.  Underwent PPM on 5/2 without issues      End-stage kidney disease on hemodialysis  On Mon, Tues, Thurs, Sat Dialysis.  Due for dialysis today  - Nephrology consult.     Acute on chronic anemia of chronic disease  Hemoglobin 8.1 on admission and dipped to 7.1 today.  Baseline supposedly 8-10 per nephrology.  Has required intermittent transfusion in the past.   - Continue epogen as scheduled  - Type and screen  - Transfuse 1 unit of PRBCs tomorrow.  Patient consented     Hypertension  PTA coreg, irbesartan, hydralazine,  Restart home medications once verified by pharmacy.     Diabetes mellitus  PTA lantus 20units BID, scheduled 12 units TID with meals.  - For now continue lantus 15 units BID, add high resistance sliding scale until definitive decision regarding cardiac testing and NPO status  - Trend BGs q4h     HIV  PTA Abacavir, darunavir, dolutegravir, ritonavir continued     Deconditioning  - Consult PT.   Recommend TCU but patient refuses      COPD  No signs acute exacerbation  - PRN duonebs available        Diet: Dialysis Diet  Snacks/Supplements Adult: Boost Shake; Between Meals  Room Service  Diet    DVT Prophylaxis: Pneumatic Compression Devices  Christie Catheter: not present  Code Status: Full Code      Disposition Plan   Expected discharge: Tomorrow, recommended to prior living arrangement once dialysis complete and patient receives a unit of blood.  Entered: Manuelito Pacheco DO 05/03/2019, 2:53 PM       The patient's care was discussed with the Bedside Nurse, Care Coordinator/ and Patient.    Manuelito Pacheco DO  Hospitalist Service  [facility]    ______________________________________________________________________    Interval History   Patient seen and examined.  Only concern at this time is fatigue.  Also has some post-op chest wall pain on the right.  No SOB. No fevers or chills.      Data reviewed today: I reviewed all medications, new labs and imaging results over the last 24 hours. I personally reviewed   EKG:  AV paced rhythm.  Rate 60 BPM.  When compared to previous paced rhythm has replaced bradycardia    Physical Exam   Vital Signs: Temp: 97.9  F (36.6  C) Temp src: Oral BP: 134/66 Pulse: 60 Heart Rate: 60 Resp: 18 SpO2: 95 % O2 Device: None (Room air) Oxygen Delivery: 2.5 LPM  Weight: 201 lbs 6.4 oz  General Appearance:  Resting comfortably.  NAD   Respiratory: Clear to auscultation.  No respiratory distress  Cardiovascular: RRR.  No murmurs  GI: Bowel sounds present.  No tenderness  Skin: No rashes.   No cyanosis  Other:  No edema       Data   Recent Labs   Lab 05/03/19  0556 05/02/19  1305 05/02/19  0800 05/02/19  0427   WBC 5.2  --   --  6.0   HGB 7.1*  --   --  8.1*   MCV 96  --   --  94   PLT 98*  --   --  102*   INR  --   --   --  1.06     --   --  132*   POTASSIUM 4.2  --   --  3.9   CHLORIDE 99  --   --  95   CO2 25  --   --  23   BUN 65*  --   --  92*    CR 7.22*  --   --  8.53*   ANIONGAP 13  --   --  14   PRABHA 9.5  --   --  9.7   *  --   --  245*   ALBUMIN 3.4  --   --  4.0   PROTTOTAL  --   --   --  8.0   BILITOTAL  --   --   --  0.5   ALKPHOS  --   --   --  174*   ALT  --   --   --  17   AST  --   --   --  11   TROPI  --  0.019 0.022 0.016     Recent Results (from the past 24 hour(s))   X-ray Chest 2 vws*    Narrative    CHEST TWO VIEWS   5/3/2019 7:45 AM     HISTORY: Evaluate for pneumothorax post cardiac device placement.    COMPARISON: 5/2/2019.    FINDINGS: There is a right subclavian cardiac device in place with  lead tips in the region of the right atrium and right ventricle. There  is no pneumothorax. The heart size is normal. Thoracic aorta is  calcified. The lungs are clear. No pleural effusion.       Impression    IMPRESSION: No pneumothorax post cardiac device placement.    LIGIA RIVERO MD

## 2019-05-03 NOTE — PROGRESS NOTES
Pt's hb is down to 7.1.  At HD over the past month, he's generally in the mid 8s to mid 9s.  D/w Dr. Pacheco re possible transfusion.

## 2019-05-03 NOTE — CONSULTS
Care Transition Initial Assessment -      Met with: Patient    Active Problems:    Angina at rest (H)    Chest pain    Cardiac pacemaker in situ       DATA  Lives With: spouse   Living Arrangements: house  Quality of Family Relationships: involved, supportive  Description of Support System: Supportive, Involved  Who is your support system?: Wife  Support Assessment: Adequate family and caregiver support.   Identified issues/concerns regarding health management:       Quality of Family Relationships: involved, supportive     Per care transitions consult for discharge planning.  Patient was admitted on 5-2-19 with chest pain.  The tentative date of discharge is 5-3-19.  Patient was admitted under observation status.  Reviewed chart and spoke with patient regarding discharge plans.  Per patient report, patient lives with his wife in a split level house with 2 steps to enter and 6 steps up and down.  Patient uses no assistive devices within the house, but he furniture walks and he uses a single end cane outside the house.  Patient has a forward wheeled walker that he does not use.  [facility]/Thursday/Saturday and his family assists with transportation.  Reviewed the therapy discharge recommendations of tcu placement on discharge and patient states that he is not interested.  Patient states that he has everything that e needs at home.  Explained why the therapist is recommending tcu placement and again patient states that he has enough help at home.  Explained that patient's [facility] should cover the TCU placement and again patient states that he is not interested in placement.  Patient is open to [facility] for RN/PT.      ASSESSMENT  Cognitive Status:  awake and alert  Concerns to be addressed: discharge planning, tcu placement versus home with homecare.     PLAN  Financial costs for the patient includes N/A.  Patient given options and choices for discharge tcu  versus homecare.  Patient/family is agreeable to the plan?  Yes  Patient Goals and Preferences: home with homecare.  Patient anticipates discharging to:  Home.    Will continue to follow and assist with a safe discharge plan.    BRIEN Ruff, [facility]  [phone]

## 2019-05-03 NOTE — PROGRESS NOTES
[facility]  EP Cardiology Progress Note  Date of Service: 05/03/2019  Primary Cardiologist: Dr. Diaz    Assessment & Plan   Reviewed with amrit Rangel seen and examined.  Post pacemaker implantation for symptomatic second degree AV block. No complications. Agree with the follow up plan.  MESSI Traylor MD      Donald Raza is a 69 year old male with past medical history significant for HIV, DM, CAD with PCI, ESRD on dialysis, anemia, HTN admitted on 5/2/2019 with  Chest pain and found to have symptomatic bradycardia with left bundle branch block and first-degree AV block.  The bradycardia was treated with atropine and dopamine and his chest resolved. He had also been dizzy and lightheaded for the past few days.      ECHO revealed EF 60-65%, mild AS, mildly dilated ascending aorta    Interval History  Course lung sounds, apaced, VS reviewed afebrile, HR 60-77 bpm, -161/45-80 mmHg, Oxygen 96% on room air.     Assessment/Plan:  1.  Symptomatic bradycardia due to second-degree AV block with LBBB s/p dual chamber pacemaker implantation ([employer])    He has left sided AV fistula therefore device implanted on right upper chest    No apparent complications     CXR shows good lead placement without evidence pneumothorax    Interrogation check showed stable leads and thresholds.     Incision is covered with clean, dry, and intact bandage    Device RN education completed    ECG reveals AV paced at 60 bpm        2. Coronary artery disease    PTA ASA, Lipitor, Plavix 75 mg daily, isosorbide 120 mg daily, nitroglycerin prn    History of PCI to LAD (2015)    angiogram (2018) revealed patent stents and functional testing did not demonstrate any significant obstructive disease    Patient having no symptoms     3. Hypertension    PTA coreg 12.5 mg twice daily, hydralazine 25 mg three times daily     Controlled      Plan:  1. Follow up in device clinic  2. Follow up with EP RENETTA in 3 months  3. EP will sign  off      DEDRICK Recinos CNP  P Heart  Text Page  (M-F 7:30 am - 4:00 pm)      Physical Exam   Temp: 98.2  F (36.8  C) Temp src: Oral BP: 141/68 Pulse: 60 Heart Rate: 60 Resp: 18 SpO2: 95 % O2 Device: Nasal cannula Oxygen Delivery: 2.5 LPM  Vitals:    05/02/19 0618 05/02/19 0800 05/03/19 0500   Weight: 93.5 kg (206 lb 2.1 oz) 93.5 kg (206 lb 2.1 oz) 91.4 kg (201 lb 6.4 oz)       GEN:  In general, this is a well nourished male in no acute distress .  HEENT:  Pupils normal size, equal, and round.   NECK: Supple, no asymmetry, masses, or scars appreciated. Trachea midline.   C/V:  Regular rate and rhythm   RESP: Respirations are unlabored.   GI: Abdomen soft, non tender.  EXTREM: no LE edema. No cyanosis or clubbing..   NEURO: Alert and oriented  PSYCH: Normal affect.  SKIN: Warm and dry.  Device incision on upper right chest covered with clean, dry and intact dressing. No hematoma,  drainage, ecchymosis.  Has dialysis access on left.      Medications       abacavir  600 mg Oral QPM     aspirin  81 mg Oral Daily     atorvastatin  40 mg Oral At Bedtime     carvedilol  12.5 mg Oral BID w/meals     clopidogrel  75 mg Oral QPM     dapsone  100 mg Oral At Bedtime     darunavir  800 mg Oral At Bedtime     dolutegravir  50 mg Oral At Bedtime     gabapentin  300 mg Oral BID     heparin  5,000 Units Subcutaneous Q12H     hydrALAZINE  25 mg Oral TID     insulin aspart  1-12 Units Subcutaneous Q4H     insulin glargine  15 Units Subcutaneous BID     irbesartan  300 mg Oral At Bedtime     isosorbide mononitrate  120 mg Oral QPM     melatonin  5 mg Oral At Bedtime     mirtazapine  15 mg Oral At Bedtime     pantoprazole  40 mg Oral Daily     ritonavir  100 mg Oral At Bedtime     sodium chloride (PF)  3 mL Intracatheter Q8H       Data   Most Recent 3 CBC's:  Recent Labs   Lab Test 05/03/19  0556 05/02/19  0427 03/01/19  1553 03/01/19  1545   WBC 5.2 6.0  --  5.3   HGB 7.1* 8.1* 7.1* 6.7*   MCV 96 94  --  95   PLT 98* 102*   --  110*     Most Recent 3 BMP's:  Recent Labs   Lab Test 05/03/19  0556 05/02/19  0427 03/01/19  1553 02/11/19 2001    132* 134 136   POTASSIUM 4.2 3.9 5.2 7.1*   CHLORIDE 99 95 98 101   CO2 25 23  --  21   BUN 65* 92* 53* 92*   CR 7.22* 8.53*  --  9.87*   ANIONGAP 13 14 11 14   PRABHA 9.5 9.7  --  9.6   * 245* 114* 110*       Last 24 hours labs reviewed

## 2019-05-03 NOTE — PLAN OF CARE
A&O. VSS. Tele 100 paced.  Mod cho diet.  Up with ax1 and walker.  Denies pain. Plan:  discharge tmrw, patient will get 2 unit of blood tmrw with dialysis.

## 2019-05-04 NOTE — PROGRESS NOTES
[facility]    Medicine Progress Note - Hospitalist Service       Date of Admission:  5/2/2019  Assessment & Plan   Donald Raza is a 69 year old male who presents with chest pain, noted to have bradycardia while en route with EMS, symptoms resolved while in ED.     Symptomatic bradycardia in setting of 2nd degree AV block s/p PPM on 5/2/19   Coronary artery disease with history of PCI  New left bundle branch block  Has undergone workup in the past, Lexiscan 12/2018 had no areas of ischemia. Cardiac cath 3/2018 showed widely patent stents. Now with bradycardia into 40s noted with EMS and in ED. Received atropine in ambulance with no effect. Was briefly started on dopamine and discontinued. EKG showed left bundle branch block with inverted t waves V5-6, which corrected on repeat EKG after nitroglycerin given. Received full dose ASA in ED  - Continue aggressive medical management (coreg, ASA, statin, irbesartan, hydralazine, imdur, plavix).  Will continue the DAPT despite the anemia as we have no evidence of active bleeding   - Cardiology consulted and appreciate their assistance.  Underwent PPM on 5/2 without issues      End-stage kidney disease on hemodialysis  On Mon, Tues, Thurs, Sat Dialysis.  - Nephrology consulted     Acute on chronic anemia of chronic disease  Possible upper GI bleed   Hemoglobin 8.1 on admission and continued to decline to 6.7 today.  Baseline supposedly 8-10 per nephrology.  Has required intermittent transfusion in the past.  Had an EGD and colonoscopy 2 years ago.  EGD showed erythematous stomach but no other significant findings.  Was also found to have diverticulosis and 2 polyps on the colonoscopy.  Denies any bleeding but does report black colored stools that he believes is from iron supplements   - Continue epogen as scheduled  - Transfuse 1 unit of PRBCs today with dialysis  - Q8h hemoglobins  - Conditional order to transfuse for Hgb <7  - [facility] consulted for further  evaluation and appreciate their assistance     Hypertension  PTA coreg, irbesartan, hydralazine     Diabetes mellitus  PTA lantus 20units BID, scheduled 12 units TID with meals.  - For now continue lantus 15 units BID, add high resistance sliding scale until definitive decision regarding cardiac testing and NPO status  - Trend BGs q4h     HIV  PTA Abacavir, darunavir, dolutegravir, ritonavir continued     Deconditioning  - Consult PT.  Recommend TCU but patient refuses      COPD  No signs acute exacerbation  - PRN duonebs available        Diet: Dialysis Diet  Snacks/Supplements Adult: Boost Shake; Between Meals  Room Service  Diet    DVT Prophylaxis: Pneumatic Compression Devices  Christie Catheter: not present  Code Status: Full Code      Disposition Plan   Expected discharge: 1-3 days, recommended to prior living arrangement once evaluated by GI .  TCU has been recommended but patient refusing   Entered: Manuelito Pacheco DO 05/04/2019, 10:54 AM       The patient's care was discussed with the Care Coordinator/ and Patient.    Manuelito Pacheco DO  Hospitalist Service  [facility]    ______________________________________________________________________    Interval History   Patient seen and examined in dialysis.  No complaints at this time.  Chest wall pain improved.  Denies any bloody stools or melena though does report black stools in the past that he believes are due to iron supplements.  No overt bleeding.  No SOB.     Data reviewed today: I reviewed all medications, new labs and imaging results over the last 24 hours. I personally reviewed no images or EKG's today.    Physical Exam   Vital Signs: Temp: 97.5  F (36.4  C) Temp src: Oral BP: 125/58   Heart Rate: 68 Resp: 15 SpO2: 97 % O2 Device: Nasal cannula Oxygen Delivery: 4 LPM  Weight: 207 lbs 12.8 oz  General Appearance: Resting comfortably.  NAD   Respiratory: Clear to auscultation.  No respiratory distress  Cardiovascular:  RRR.  No murmurs  GI: Bowel sounds present.  No tenderness   Skin: No rashes.  No cyanosis  Other: No edema      Data   Recent Labs   Lab 05/04/19  0605 05/03/19  0556 05/02/19  1305 05/02/19  0800 05/02/19  0427   WBC 5.3 5.2  --   --  6.0   HGB 6.7* 7.1*  --   --  8.1*   MCV 95 96  --   --  94   PLT 97* 98*  --   --  102*   INR  --   --   --   --  1.06    137  --   --  132*   POTASSIUM 4.5 4.2  --   --  3.9   CHLORIDE 95 99  --   --  95   CO2 25 25  --   --  23   BUN 89* 65*  --   --  92*   CR 9.13* 7.22*  --   --  8.53*   ANIONGAP 13 13  --   --  14   PRABHA 9.8 9.5  --   --  9.7   * 140*  --   --  245*   ALBUMIN 3.4 3.4  --   --  4.0   PROTTOTAL  --   --   --   --  8.0   BILITOTAL  --   --   --   --  0.5   ALKPHOS  --   --   --   --  174*   ALT  --   --   --   --  17   AST  --   --   --   --  11   TROPI  --   --  0.019 0.022 0.016     No results found for this or any previous visit (from the past 24 hour(s)).

## 2019-05-04 NOTE — PROVIDER NOTIFICATION
Brief  Update:    Paged re: q4h BG checks    Pt on dialysis diet.    Have switched to QID (meals and HS) BG checks    Medium dose sliding scale with addition of prandial insulin at 1 unit per 20 g    Ariel Ralph MD  10:39 PM

## 2019-05-04 NOTE — PROGRESS NOTES
[facility]     Renal Progress Note       SHORTHAND KEY FOR MY NOTES:  c = with, s = without, p = after, a = before, x = except, asx = asymptomatic, tx = transplant or treatment, sx = symptoms or symptomatic, cx = canceled or culture, rxn = reaction, yday = yesterday, nl = normal, abx = antibiotics, fxn = function, dx = diagnosis, dz = disease, m/h = melena/hematochezia, c/d/l/ha = cramping/dizziness/lightheadedness/headache, d/c = discharge or diarrhea/constipation, f/c/n/v = fevers/chills/nausea/vomiting, cp/sob = chest pain/shortness of breath, tbv = total body volume, rxn = reaction, tdc = tunneled dialysis catheter, pta = prior to admission, hd = hemodialysis, pd = peritoneal dialysis, hhd = home hemodialysis         Assessment/Plan:     1.  ESKD.  Pt dialysing per [facility] schedule.  We will pull additional fluid today.  A.  Next run on Monday.    2.  Anemia.  His hb continues to trend down. He's had multiple episodes of severe anemia requiring transfusions, and says he had a GI w/u at [facility] (EGD - 2/17, colon 5/17).    A.  Follow labs, clinically.  B.  GI w/u.    3.  Heart block s/p pacer.  Pt is stable x some chest wall tenderness.  He is doing much better clinically since he had the pacer.  A.  Follow clinically.    4.  FEN.  Electrolytes are ok.  A.  Dialysis diet.        Interval History:     Pt had a pacer placed the other day and feels better now x for some tenderness at the site.  No f/c/n/v.  No issues c HD.    He is due for blood today.  Denies any signs of GI or other bleeding.  Breathing ok, no cp.          Medications and Allergies:       - MEDICATION INSTRUCTIONS for Dialysis Patients -   Does not apply See Admin Instructions     abacavir  600 mg Oral QPM     aspirin  81 mg Oral Daily     atorvastatin  40 mg Oral At Bedtime     carvedilol  12.5 mg Oral BID w/meals     clopidogrel  75 mg Oral QPM     dapsone  100 mg Oral At Bedtime     darunavir  800 mg Oral At Bedtime      dolutegravir  50 mg Oral At Bedtime     gabapentin  300 mg Oral BID     heparin  5,000 Units Subcutaneous Q12H     hydrALAZINE  25 mg Oral TID     insulin aspart  1-7 Units Subcutaneous TID AC     insulin aspart  1-5 Units Subcutaneous At Bedtime     insulin aspart   Subcutaneous TID w/meals     insulin glargine  15 Units Subcutaneous BID     irbesartan  300 mg Oral At Bedtime     isosorbide mononitrate  120 mg Oral QPM     melatonin  5 mg Oral At Bedtime     mirtazapine  15 mg Oral At Bedtime     - MEDICATION INSTRUCTIONS -   Does not apply Once     pantoprazole  40 mg Oral Daily     ritonavir  100 mg Oral At Bedtime     sodium chloride (PF)  3 mL Intracatheter Q8H     Allergies   Allergen Reactions     Calcium Acetate Other (See Comments)     Other reaction(s): Other, see comments  Pain in chest and back  Pain in chest area (sensitive skin)      Diagnostic X-Ray Materials Other (See Comments)     PN: renal failure     Lisinopril      Sulfa Drugs           Physical Exam:     Vitals were reviewed    Heart Rate: 65, Blood pressure 119/60, pulse 60, temperature 98.4  F (36.9  C), resp. rate 15, weight 94.3 kg (207 lb 12.8 oz), SpO2 90 %.  Wt Readings from Last 3 Encounters:   05/04/19 94.3 kg (207 lb 12.8 oz)   01/29/19 89.1 kg (196 lb 6.9 oz)   01/05/19 85.5 kg (188 lb 7.9 oz)     Intake/Output Summary (Last 24 hours) at 5/4/2019 1005  Last data filed at 5/4/2019 0930  Gross per 24 hour   Intake 1020 ml   Output 20 ml   Net 1000 ml     GENERAL APPEARANCE: pleasant, NAD, alert  HEENT:  eyes/ears/nose/neck grossly normal  RESP: lungs cta b c good efforts, no crackles  CV: RRR c 2/6 m, nl S1/S2; R chest pacer - area tender  ABDOMEN: o/s/nt/nd  EXTREMITIES/SKIN: no significant ble edema    Pt seen on HD.  Stable run. Good BFR via LAF.  -3L net UF goal.         Data:     CBC RESULTS:     Recent Labs   Lab 05/04/19  0605 05/03/19  0556 05/02/19  0427   WBC 5.3 5.2 6.0   RBC 2.23* 2.33* 2.68*   HGB 6.7* 7.1* 8.1*   HCT  21.1* 22.4* 25.1*   PLT 97* 98* 102*     Basic Metabolic Panel:  Recent Labs   Lab 05/04/19  0605 05/03/19  0556 05/02/19 0427    137 132*   POTASSIUM 4.5 4.2 3.9   CHLORIDE 95 99 95   CO2 25 25 23   BUN 89* 65* 92*   CR 9.13* 7.22* 8.53*   * 140* 245*   PRABHA 9.8 9.5 9.7     INR  Recent Labs   Lab 05/02/19 0427   INR 1.06      Attestation:   I have reviewed today's relevant vital signs, notes, medications, labs and imaging.    Bipin Chaves MD  [facility] - Nephrology  [phone]

## 2019-05-04 NOTE — PLAN OF CARE
Pt A&O, VSS on 2 L 02. Tele SR 1st deg AVB and BBB. Pt denies CP, dizziness, and SOB. Pt FRANCO. Plan for dialysis today w/ transfusion for low Hgb. Will continue to monitor.

## 2019-05-04 NOTE — PLAN OF CARE
A&O.  VSS. Tele 100% paced.   Dialysis diet.  Up with ax1 and walker.  Plan is to discontinue to home tmrw.

## 2019-05-04 NOTE — PROVIDER NOTIFICATION
MD Notification    Notified Person: MD    Notified Person Name: Dr. Ralph    Notification Date/Time: 5/4/19    Notification Interaction: Phone    Purpose of Notification: Critical Hgb of 6.7, plans already in place to transfuse pt at HD.     Orders Received: continue as planned    Comments:

## 2019-05-04 NOTE — PROGRESS NOTES
Potassium   Date Value Ref Range Status   05/04/2019 4.5 3.4 - 5.3 mmol/L Final     Lab Results   Component Value Date    HGB 6.7 05/04/2019     Weight: 94.3 kg (207 lb 12.8 oz)  POST WT 91.3kg  DIALYSIS PROCEDURE NOTE  Hepatitis status of previous patient on machine log was checked and verified ok to use with this patients hepatitis status.  Patient dialyzed for 3 hrs. on a 3 K bath with a net fluid removal of  3L.  A BFR of 400 ml/min was obtained via a LUAF using 15gauge needles.    The patient was seen by Dr. Chaves during the treatment.  Total heparin received during the treatment: 0 units. Sites held x 15 min then  pressure drsgs applied.    Meds  Given:EPO 10,000units IV alone with two units PRBC's given Complications: NONE.  Procedure and ESRD teaching diet teaching and verbalizes understanding.   See flowsheet in EPIC for further details and post assessment.  Machine water alarm in place and functioning. Transducer pods intact and checked every 15min.  Pt returned via wheelchair  Vascular Access: Aseptic prep done for both on/off.  Report received from:  Georgie Simmons R.N  Report given to: Georgie Simmons R.N.  HEPATITIS B SURFACE ANTIGEN  Neg  DATE  1/29/19    HEPATITIS B SURFACE ANTIBODY Immune DATE  1/29/19  Chlorine/Chloramine water system checked every 4 hours.  Outpatient Dialysis at [facility]

## 2019-05-04 NOTE — CONSULTS
Consult Date:  05/04/2019      REQUESTING PHYSICIAN:  Dr. Pacheco.      CHIEF COMPLAINT:  Anemia.      HISTORY OF PRESENT ILLNESS:  Mr. Raza is a pleasant 69-year-old male with a past medical history significant for hypertension, diabetes, HIV, COPD, end-stage kidney disease on hemodialysis, chronic anemia, and coronary artery disease who was admitted to the hospital for chest pain.  He has significant heart disease.  Please refer to the Hospitalist notes for details on that.  The main reason we were consulted is for his chronic anemia.  He has been noted to be drifting down into the 6 range.  In reviewing his records extensively, he does seem to trend down into the 6 range without any evidence of GI bleeding.  He does report that he gets a blood transfusion about every 2 months, in addition to taking daily iron supplements.  He reports that his stools tend to be dark secondary to the iron supplements.  His last bowel movement was 2 days ago.  When he does have a bowel movement is usually one and it is formed, it is not sticky or loose.  He has had a workup in the past for his anemia with his hemoglobins down into the 6 range.  In 8/2011, he had a colonoscopy for workup of anemia at [facility] that did not show anything as a cause for his anemia and he had 1 polyp removed.  In 02/2017, he had an EGD and colonoscopy again for workup of anemia.  These were done through [facility] and his upper endoscopy showed gastropathy.  The colonoscopy, I could not find the report to look at it, but there is a nice note from the Hospitalist that reports it showed colonic diverticulosis and 2 polyps that were removed.  I did also note that his hemoglobin in February had also dipped down into the 6 range while he was hospitalized again for chest pain at that time over at [facility].  He was given blood transfusions at that time.      REVIEW OF SYSTEMS:  As noted in history of present illness.      FAMILY HISTORY:   Noncontributory to this visit.      SOCIAL HISTORY:  Former smoker.  No use of alcohol or drugs.      PHYSICAL EXAMINATION:   GENERAL:  The patient sitting on a chair.  No acute distress.  Pleasant and cooperative.   HEART:  Regular rhythm and rate.   LUNGS:  Clear to auscultation bilaterally.   ABDOMEN:  Soft, nontender, nondistended, normoactive bowel sounds.   SKIN:  He has a fistula where he gets dialysis is in the left arm.      IMPRESSION AND PLAN:  Mr. Raza is a pleasant 69-year-old male who is admitted with chest pain, noted to have anemia.  His anemia is chronic and in reviewing his records, he does tend to frequently dip down into the 6 range as he has during this admission and he responds to blood transfusions.  He gets a blood transfusion every couple of months.  He is also on iron supplements on a daily basis.  He has had a GI workup in the past through [facility] and [facility]  for his anemia that has included upper endoscopy and colonoscopy.  There are no signs of an active GI bleed at this time.  His stools are dark secondary to iron supplements.  I do not see a need to do any procedures at this time since he has had this worked up recently in 2017.  If he does start having melena as sticky, runny black stools, please give me a call and we are happy to re-evaluate the patient again.  For now, I recommend supportive measures with continuation of his oral iron supplements and blood transfusions as needed.  Please call me with any questions or concerns. Signing off.  He should follow up with his GI doctor at [facility] once he is discharged from this hospitalization.        Dayanara Tillman MD  [facility]           D: 2019   T: 2019   MT: GAYATHRI      Name:     GREGORIO RAZA   MRN:      [id number]        Account:       [id number]   :      1950           Consult Date:  2019      Document: [id number]

## 2019-05-05 NOTE — PLAN OF CARE
Nursing note  Pt a/o x 4, up with assist of 1/walker/GB in the room. Pt denies any pain. Pt denies any dizziness or lightheadedness. Pt denies any n/v. Pt has baseline numbness/tingling BLE's. Dressing on the L upper arm fistula c/d/I. VSS./64 (BP Location: Right arm)   Pulse 63   Temp 98.1  F (36.7  C) (Oral)   Resp 20   Wt 92.6 kg (204 lb 3.2 oz)   SpO2 92%   BMI 28.48 kg/m  Tele is 100% A paced w/occa.PVC's. Pt on carb count BGM on this shift is 133 and 202, pt received coverage. Pt will be dialyzing tomorrow. Will continue to monitor.

## 2019-05-05 NOTE — PLAN OF CARE
Discharge Planner PT   Patient plan for discharge: Home with family  Current status: Supine<> sit from flat HOB with railing SBA. Sit to stand x 2 reps with close SBA and cues for safety with hand placement. Stand step transfer from EOB to chair with SBA and cues for safety. Amb 120ft with FWW and close SBA, CGA with threshold changes and intermittently with turning. Pt with flexed forward trunk, slow pace. Instructed with up/down 3 steps x 2 reps with CGA and bilateral railing. All mobility on 3L NC. At rest SPO2=95%, post activity 92%. Pt reports moderate fatigue at end of session.    Barriers to return to prior living situation: A x 1 for all mobility, stairs to enter at home, stairs w/in home, falls risk  Recommendations for discharge: TCU  Rationale for recommendations: Pt demonstrating progress from initial evaluation, however continues to demonstrate balance deficits, limiting his independence with mobility.  Pt would benefit from rehab at TCU prior to return home to maximize his safety with IND mobility. PT currently declining TCU, therefore recommend 24/7 supervision at home with home PT/OT services and recommend use of FWW for all mobility for safety.          Entered by: Shira Drake 05/05/2019 5:04 PM

## 2019-05-05 NOTE — PROGRESS NOTES
[facility]    Medicine Progress Note - Hospitalist Service       Date of Admission:  5/2/2019  Assessment & Plan   Donald Raza is a 69 year old male who presents with chest pain, noted to have bradycardia while en route with EMS, symptoms resolved while in ED.     Symptomatic bradycardia in setting of 2nd degree AV block s/p PPM on 5/2/19   Coronary artery disease with history of PCI  New left bundle branch block  Has undergone workup in the past, Lexiscan 12/2018 had no areas of ischemia. Cardiac cath 3/2018 showed widely patent stents. Now with bradycardia into 40s noted with EMS and in ED. Received atropine in ambulance with no effect. Was briefly started on dopamine and discontinued. EKG showed left bundle branch block with inverted t waves V5-6, which corrected on repeat EKG after nitroglycerin given. Received full dose ASA in ED  - Continue aggressive medical management (coreg, ASA, statin, irbesartan, hydralazine, imdur, plavix).  Will continue the DAPT despite the anemia as we have no evidence of active bleeding   - Cardiology consulted and appreciate their assistance.  Underwent PPM on 5/2 without issues      End-stage kidney disease on hemodialysis  On Mon, Tues, Thurs, Sat Dialysis.  - Nephrology consulted and managing     Acute on chronic anemia of chronic disease  Possible upper GI bleed   Hemoglobin 8.1 on admission and  continued to decline to 6.7 today.  Baseline supposedly 8-10 per nephrology.  Has required intermittent transfusion in the past.  Had an EGD and colonoscopy 2 years ago.  EGD showed erythematous stomach but no other significant findings.  Was also found to have diverticulosis and 2 polyps on the colonoscopy.  Denies any bleeding but does report black colored stools that he believes is from iron supplements   Has been transfused 1 unit of PRBCs. Hgb increased to 8.6 and slowly trended down to 8.0 but has since stabilized   - Epo increased by nephrology   -  Conditional order to transfuse for Hgb <7  - Recheck tomorrow   - [facility] consulted for further evaluation and appreciate their assistance.  Recommend outpatient follow up      Hypertension  PTA coreg, irbesartan, hydralazine     Diabetes mellitus  PTA lantus 20units BID, scheduled 12 units TID with meals.  - For now continue lantus 15 units BID, add high resistance sliding scale until definitive decision regarding cardiac testing and NPO status  - Trend BGs q4h     HIV  PTA Abacavir, darunavir, dolutegravir, ritonavir continued     Deconditioning  - Consult PT.  Recommend TCU but patient refuses      COPD  No signs acute exacerbation  - PRN duonebs available      Diet: Dialysis Diet  Snacks/Supplements Adult: Boost Shake; Between Meals  Room Service  Diet    DVT Prophylaxis: Pneumatic Compression Devices  Christie Catheter: not present  Code Status: Full Code      Disposition Plan   Expected discharge: Tomorrow, recommended to prior living arrangement once hemoglobin stable.  Entered: Manuelito Pacheco DO 05/05/2019, 1:49 PM       The patient's care was discussed with the Patient.    Manuelito Pacheco DO  Hospitalist Service  [facility]    ______________________________________________________________________    Interval History   Patient seen and examined.  Still denies any bloody stools or melena.  No pain.  No difficulty breathing.      Data reviewed today: I reviewed all medications, new labs and imaging results over the last 24 hours. I personally reviewed no images or EKG's today.    Physical Exam   Vital Signs: Temp: 98.1  F (36.7  C) Temp src: Oral BP: 134/64 Pulse: 63 Heart Rate: 64 Resp: 20 SpO2: 92 % O2 Device: None (Room air) Oxygen Delivery: 3 LPM  Weight: 204 lbs 3.2 oz  General Appearance: Resting comfortably.  NAD   Respiratory: Clear to auscultation.  No respiratory distress  Cardiovascular: RRR.  No murmurs  GI: Bowel sounds present.  Non-tender  Skin: No rashes.  No  cyanosis  Other: No edema      Data   Recent Labs   Lab 05/05/19  1221 05/05/19  0553 05/04/19  2343  05/04/19  0605 05/03/19  0556 05/02/19  1305 05/02/19  0800 05/02/19  0427   WBC  --   --   --   --  5.3 5.2  --   --  6.0   HGB 8.0* 8.0* 8.4*   < > 6.7* 7.1*  --   --  8.1*   MCV  --   --   --   --  95 96  --   --  94   PLT  --  94*  --   --  97* 98*  --   --  102*   INR  --   --   --   --   --   --   --   --  1.06   NA  --   --   --   --  133 137  --   --  132*   POTASSIUM  --   --   --   --  4.5 4.2  --   --  3.9   CHLORIDE  --   --   --   --  95 99  --   --  95   CO2  --   --   --   --  25 25  --   --  23   BUN  --   --   --   --  89* 65*  --   --  92*   CR  --   --   --   --  9.13* 7.22*  --   --  8.53*   ANIONGAP  --   --   --   --  13 13  --   --  14   PRABHA  --   --   --   --  9.8 9.5  --   --  9.7   GLC  --   --   --   --  217* 140*  --   --  245*   ALBUMIN  --   --   --   --  3.4 3.4  --   --  4.0   PROTTOTAL  --   --   --   --   --   --   --   --  8.0   BILITOTAL  --   --   --   --   --   --   --   --  0.5   ALKPHOS  --   --   --   --   --   --   --   --  174*   ALT  --   --   --   --   --   --   --   --  17   AST  --   --   --   --   --   --   --   --  11   TROPI  --   --   --   --   --   --  0.019 0.022 0.016    < > = values in this interval not displayed.     No results found for this or any previous visit (from the past 24 hour(s)).

## 2019-05-05 NOTE — PLAN OF CARE
Pt oriented x4. VSS on RA. L/S diminished. C/O incisional pain. 1 Percocet/ice w effect. Right upper chest pacer site CDI. CMS intact. Tele paced. On renal diet. Dialysis. Q 8h Hgb, 8.4. 2 units RBC 5/4 during dialysis. Next Hgb 0800.  at HS, 139 at 0200. Up Ax1-2, +Bm. Continue to monitor.

## 2019-05-05 NOTE — PROGRESS NOTES
Notes, labs, vitals reviewed.  Hb trending a bit lower still.  GI consulted yday and plan is to monitor.  Will increase EPO to 15k qhd.  Next HD tmrw per [facility]TR[facility] schedule.  Orders placed.

## 2019-05-06 NOTE — PROGRESS NOTES
Potassium   Date Value Ref Range Status   05/06/2019 4.7 3.4 - 5.3 mmol/L Final   ]  Lab Results   Component Value Date    HGB 7.8 05/06/2019     Weight: 94.3 kg (207 lb 14.4 oz)  POST WT -3kg  DIALYSIS PROCEDURE NOTE  Hepatitis status of previous patient on machine log was checked and ok to use with this patient hepatitis status.  Patient dialyzed for 2.5 hrs (Per MD telephone order) on a 2 K bath with a  net fluid removal of 3L.  A BFR of 450ml/min was obtained via URAVF.  Patient was seen by Dr. Hamilton during treatment.  Meds given: Epogen [id number] Complications: none     See flowsheet in EPIC for further details and post assessment. Transducer pods intact and checked every 15 mins.  Machine water alarm in place and functioning.  HEPATITIS B SURFACE ANTIGEN Negative Date 1/29/19 HEPATITIS B SURFACE ANTIBODY Immune DATE 1/29/19  CHLORINE AND CHLORAMINES CHECK on WATER SYSTEM EVERY 4 hours.   PT returned via wheelchair  Catheter Access: Aseptic prep done for both on/off.  Report given to: YANNA Guzmán RN

## 2019-05-06 NOTE — PLAN OF CARE
Patient is alert and oriented, assist of one with a walker. Denies chest pain and shortness of breath. Vital signs stable over night, PRN senna given per patient request. Pacemaker dressing is clean, dry, and intact. Plan for dialysis run this morning and then discharge.

## 2019-05-06 NOTE — PROGRESS NOTES
Care Coordination:    MD updated that patient will be discharge this afternoon after dialysis.  Would like PCP/GI/ AP cardiac appointments set up.  Met with patient and he is open to me making these appointments.  States any non dialysis day works (Wed/Fri).  Following appointments were made  PCP Dr Owen  Wed. May 15th 10:30  GI: Wed June 5th 10:10, [facility]  AP: Claire Adorno APRN Aug 7th 12:30    Above added to AVS.    [facility] ([phone]) updated that patient is discharge today and will be at dialysis on Tuesday. Would like discharge summary faxed to [phone].  Patient to resume home care (RN/PT) with  homecare.  Updated email sent to [facility].  Orders on AVS.  Patient voiced no other concerns for discharge.  Wife or brother can drive at discharge and to appointments and dialysis.        Gretel Sommer RN BSN  Care Coordinator

## 2019-05-06 NOTE — PLAN OF CARE
Discharge Planner PT   Patient plan for discharge: Home with family  Current status: Supine<> sit from flat HOB without railing SBA with increased time and effort. Sit to stand x 2 reps with close SBA and cues for safety with hand placement. Stand step transfer from EOB to chair with SBA and cues for safety. Amb 150ft with FWW and close SBA, cue for proximity to the walker for balance and to decrease pressure on right UE on walker (new pacemaker). Pt with flexed forward trunk, slow pace and 1 standing rest break. All mobility on room air . At rest SPO2=92%, post activity 90%. Pt reports moderate fatigue at end of session. Pt reported new complaints of right eye blurred vision during session-RN informed.    Barriers to return to prior living situation: A x 1 for all mobility, stairs to enter at home, stairs w/in home, falls risk  Recommendations for discharge: TCU  Rationale for recommendations: Pt demonstrating progress from initial evaluation, however continues to demonstrate balance deficits, limiting his independence with mobility.  Pt would benefit from rehab at TCU prior to return home to maximize his safety with IND mobility. PT currently declining TCU, therefore recommend 24/7 supervision at home with home PT/OT services and recommend use of FWW for all mobility for safety.            Entered by: Shira Drake 05/06/2019 10:07 AM

## 2019-05-06 NOTE — PROGRESS NOTES
Inpatient Dialysis Progress Note           Assessment and Plan:   ESRD status quo.  Stable dialysis run today.  Expect good chemical exchange and improved fluid balance by end of run.  Note plan for discharge today.  [facility].  Next HD there 5/7.      Bradycardia    Angina at rest (H)    Chest pain    Cardiac pacemaker in situ    * No resolved hospital problems. *               Interval History:   no new complaints, up and ambulating, alert, oriented to person, place and time and doing well; no cp, sob, n/v/d, or abd pain.  Examined during run.  VS ok throughout.  Good intake.  Minimal UO.  Wt up 1.7kg in 24 hrs.  Meds and labs reviewed.  Lytes ok, except sl lower Na+ 130.  BUN/Cr in expected range.  CBC stable; Hgb ~8 and plts ~100K.                      Dialysis Details:   Current weight: 94.3 kg (actual weight)  Dry Weight: TBD  Dialysis Temp: 36.5  C  Access Device: AVF  Access Site: right  Dialyzer: Optiflux 160  Dialysis Bath: K+ 2  Sodium Profile: no  UF Goal: 3L  Blood Flow Rate (mL/min): 450  Total Treatment Time (hrs): 2.5  Heparin: none    Medications:  EPO dose: 18833  Zemplar: no  IV Fe: no            Medications:       - MEDICATION INSTRUCTIONS for Dialysis Patients -   Does not apply See Admin Instructions     abacavir  600 mg Oral QPM     aspirin  81 mg Oral Daily     atorvastatin  40 mg Oral At Bedtime     carvedilol  12.5 mg Oral BID w/meals     clopidogrel  75 mg Oral QPM     dapsone  100 mg Oral At Bedtime     darunavir  800 mg Oral At Bedtime     dolutegravir  50 mg Oral At Bedtime     gabapentin  300 mg Oral BID     hydrALAZINE  25 mg Oral TID     insulin aspart  1-7 Units Subcutaneous TID AC     insulin aspart  1-5 Units Subcutaneous At Bedtime     insulin aspart   Subcutaneous TID w/meals     insulin glargine  15 Units Subcutaneous BID     irbesartan  300 mg Oral At Bedtime     isosorbide mononitrate  120 mg Oral QPM     melatonin  5 mg Oral At  Bedtime     mirtazapine  15 mg Oral At Bedtime     pantoprazole  40 mg Oral Daily     ritonavir  100 mg Oral At Bedtime     sodium chloride (PF)  3 mL Intracatheter Q8H                      Physical Exam:       Vital Sign Ranges  Temp:  [97.7  F (36.5  C)-98.4  F (36.9  C)] 97.7  F (36.5  C)  Heart Rate:  [60-76] 76  Resp:  [16-18] 18  BP: (125-161)/(59-90) 150/90  SpO2:  [87 %-96 %] 93 %    Weight, current: 94.3 kg (actual weight)  Weight change: 1.678 kg (3 lb 11.2 oz)    I/O last 3 completed shifts:  In: 480 [P.O.:480]  Out: -     Physical Exam:   General:  Patient comfortable, in no apparent distress.  Awake, alert, oriented x3.  Neck:  Supple, no JVD.  Lungs:  Clear to auscultation bilaterally.  Cardiac:  Regular rate and rhythm, usual syst murmur, no rub or gallops.  Abdomen:  Soft, nontender, physiologic sounds.  Extremities:  Without edema.  2+ pulses.  Access fine.  Skin:  Warm, dry.  Neurologic:  No focal deficits.             Data:        Lab Results   Component Value Date     (L) 05/06/2019    Lab Results   Component Value Date    CHLORIDE 92 (L) 05/06/2019    Lab Results   Component Value Date    BUN 76 (H) 05/06/2019      Lab Results   Component Value Date    POTASSIUM 4.7 05/06/2019    Lab Results   Component Value Date    CO2 26 05/06/2019    Lab Results   Component Value Date    CR 8.69 (H) 05/06/2019        Lab Results   Component Value Date     (L) 05/06/2019     05/04/2019     05/03/2019     Lab Results   Component Value Date    POTASSIUM 4.7 05/06/2019    POTASSIUM 4.5 05/04/2019    POTASSIUM 4.2 05/03/2019     Lab Results   Component Value Date    CHLORIDE 92 (L) 05/06/2019    CHLORIDE 95 05/04/2019    CHLORIDE 99 05/03/2019     Lab Results   Component Value Date    CO2 26 05/06/2019    CO2 25 05/04/2019    CO2 25 05/03/2019     Lab Results   Component Value Date    CR 8.69 (H) 05/06/2019    CR 9.13 (H) 05/04/2019    CR 7.22 (H) 05/03/2019     Lab Results   Component  Value Date    BUN 76 (H) 05/06/2019    BUN 89 (H) 05/04/2019    BUN 65 (H) 05/03/2019     Lab Results   Component Value Date    HGB 7.8 (L) 05/06/2019    HGB 8.0 (L) 05/05/2019    HGB 8.0 (L) 05/05/2019     Lab Results   Component Value Date    PH 7.36 09/03/2018    PO2 82 09/03/2018    PCO2 36 09/03/2018    HCO3 20 (L) 09/03/2018    SARANYA 2.5 01/06/2017           Pankaj Hamilton MD  Nephrology; [employer]  [phone]

## 2019-05-06 NOTE — PLAN OF CARE
VSS. Monitor remains Paced rhythm. Pt. Denies pain. Right chest PPM dressing CDI. Pt. In dialysis per order. Plan for discharge today. Continue to monitor.

## 2019-05-07 NOTE — PLAN OF CARE
Physical Therapy Discharge Summary    Reason for therapy discharge:    Discharged to home. With Brother    Progress towards therapy goal(s). See goals on Care Plan in [facility] electronic health record for goal details.  Goals not met.  Barriers to achieving goals:   discharge from facility.    Therapy recommendation(s):    Continued therapy is recommended.  Rationale/Recommendations:  TCU recommended but pt discharged home with brother.

## 2019-05-07 NOTE — PROGRESS NOTES
Pt discharged to home with brother. Ambulatory with walker and SBA. Denies pain on discharge. PPM site dressing removed with steristrips CDI. HHC and PT to resume at home. Pt refused dinner BP meds, states takes at bedtime post HD. Note BP at discharge elevated to 150 and 179 systolically. Reinforced for pt to take evening pills once home. Brother and daughter aware of plan. PIV dc'd intact.

## 2019-05-13 PROBLEM — I16.1 HYPERTENSIVE EMERGENCY: Status: ACTIVE | Noted: 2019-01-01

## 2019-05-13 NOTE — CONSULTS
INPATIENT CARIOLOGY CONSULTATION.    [facility].  Date of admission: 5/13/2019.  Date of consult: 5/13/2019.     REFERRAL SOURCE:  Bj Ocampo DO      PRIMARY CARDIOLOGIST:  Dr. Arnoldo Diaz MD and DEDRICK Gary, CNP.      HISTORY OF PRESENT ILLNESS:    Mr. Raza is well known to Cardiology.  His primary cardiologist is Dr. Arnoldo Diaz.    His medical history is significant for:   1.  Coronary artery disease, status post several non-STEMIs and coronary artery stenting.  In the last 18 months he has had 2 coronary angiograms (10/2017 and 03/2018) which showed patent diagonal and left anterior descending stents, no significant obstructive coronary artery disease (confirmed with fractional flow reserve).  Therefore, medical therapy has been recommended.   2.  End-stage renal disease on hemodialysis.  Monday, Wednesday, Friday.  He was due for dialysis today.   3.  Type 2 diabetes mellitus.   4.  Hypertension with significant lability.   5.  Dyslipidemia with significant Hypertriglyceridemia.   6.  Recent hospitalization for bradyarrhythmia in the setting of underlying chronic left bundle branch block, significant bradyarrhythmia.  He underwent a dual-chamber pacemaker implantation on 05/02/2019.  Functioning pacemaker per echocardiogram review.   7.  In 2018, he was evaluated for anemia with a hemoglobin of 8.  No significant cause for this has been found.   9.  Human immunodeficiency virus positive status.      Donald was admitted in the early hours of this morning after being woken up from sleep with a sudden acute shortness of breath associated with chest tightness.  When emergency medical services arrived, his blood pressure was 210/140, and he was hypoxemic that responded well to continuous positive airway pressure initiation and 6 sublingual nitroglycerin after which blood pressure improved to 144/60 and chest pain resolved.  Note, the patient gets dialysis on Monday, Wednesday,  Friday and is due today.  Patient is aneuric and therefore not on a diuretic.     The patient was admitted early this morning with acute pulmonary edema, chest tightness, significant hypertension.  When EMS arrived his BP was 210/140 mmHg.  He received 325 mg of aspirin and 6 sublingual nitroglycerin, and started on CPAP.  After this his symptoms resolved, and in the ER, BP was 157/66 mmHg, paced rhythm, sats 98% on CPAP.  His serial troponins (in the setting of end-stage renal disease) were not particularly elevated (0.019 is his baseline and it was 0.043).  NT-proBNP 24,000, serum potassium 5.3.      His symptoms have completely resolved after being treated with IV nitroglycerin infusion and normalization of the blood pressure.  When I visited with him, he getting dialysis, on CPAP, fully alert and oriented.  Was on continuous positive airway pressure, fully alert and able to answer questions, and getting hemodialysis.  His dyspnea and chest pain has resolved.      DIAGNOSTIC DATA:  I reviewed his last 2 angiogram coronary angiograms from 03/2018 in 10/2017 -- As documented above.      ECG -- paced rhythm at 60 BPM.      Recent echocardiogram 05/02/2019.  Left ventricule of 60% -65%  A non-dilated left ventricle with mild concentric left ventricular hypertrophy.  Mild aortic valve stenosis with a mean gradient of 19 mmHg, valve area 1.7 cm2.  Mild mitral regurigtation, mild triucpsid regurgitation.      CXR done today.  Pulmonary edema.  Small bilateral pleural effusions.  Pacemaker lead positions satisfactory.        PRIOR TO ADMISSION CARDIAC MEDICAITONS:   1.  Aspirin 81 mg.   2.  Atorvastatin 40 mg.   3.  Carvedilol 12.5 mg b.i.d.   4.  Clopidogrel 75 mg daily.   5.  Hydralazine 25 mg t.i.d.   6.  Irbesartan 300 mg daily.   7.  Isosorbide mononitrate (Imdur) 120 mg daily.    8. Other medications  - Insulin and human immunodeficiency virus therapy as documented in the Epic note below.      PHYSICAL EXAMINATION:    Blood pressure is 108/58 mmHg (IV nitroglycerin drip stopped), heart rate 65 BPM, paced), saturations 97% on room air/continuous positive airway pressure.  Complete exam not possible because  the patient was being actively dialyzed. , but his heart sounds are regular pacemaker site is satisfactory, no audible murmur or friction rub.   ABDOMEN:  Soft.    EXTREMITIES:  He has no lower extremity edema.    PSYCHIATRIC:  Mentally alert and oriented.      PAST MEDICAL,   1.  Acute pulmonary edema in the setting of dialysis dependent end-stage renal disease and anuric status and significant hypertension.   2.  Coronary artery disease, status post several PCIs and non-STEMIs with patent stents and no physiologically significant coronary artery disease on 2 consecutive coronary angiograms in the last 18 months  (2017 and 2018).   3.  End-stage renal disease on hemodialysis via a left arm arteriovenous fistula.  Monday, Wednesday, Friday.   4.  Controlled hypertension.   5.  Human immunodeficiency virus positive status.  On HAART therapy.   6.  Hyperlipidemia.      ASSESSMENT/PLAN:   1.  The patient presented with acute pulmonary edema which is likely due to fluid overload from significant hypertension and ESRD.  His chest tightness has resolved and is more likely demand ischemia rather than a true myocardial infarction.  I note even on his last catheterization, his LVEDP was elevated at 40 mmHg.  Therefore, I think his primary therapy is going to be aggressive fluid removal on hemodialysis.   2.  Continue hemodialysis.  I personally spoke to the dialysis nurse.  Typically, they remove 3 liters, but I have advised that we try close to 4 liters if hemodynamics permit.  Otherwise, he will require a second dialysis done tomorrow.   3.  Repeat coronary angiogram not indicated as this is most likely demand ischemia rather than a true NSTEMI.   4.  Hypertension management.  Continue his home medications of carvedilol 12.5 mg  b.i.d., Imdur 120 mg daily.   --  However, I would stop the hydralazine and give him amlodipine 5 mg b.i.d. instead.  This can be initiated later tonight or tomorrow.   --If he remains hypertensive, increase carvedilol to 25 mg twice daily, as he is now protected from bradycardia arrhythmia by his pacemaker.  5.  Repeat echocardiogram not indicated, as one was done a week ago.   6.  Outpatient Cardiology will sign off.  Will schedule for outpatient followup.      Thank you for consulting us.         MAYA POWELL MD             D: 2019   T: 2019   MT: KAMILLE      Name:     GREGORIO BRUSH   MRN:      [id number]        Account:       [id number]   :      1950           Consult Date:  2019      Document: [id number]

## 2019-05-13 NOTE — ED TRIAGE NOTES
Patient woke up with sudden onset of chest pain at 2 am today with difficulty breathing.EMS was called he was given 6 nitro for chest pain 10/10,he was put on cpap which help his breathing and pain 0/10. He was given 324 ASA.HX of MI 2 years ago and is on dialysis. Patient oxygen saturation dropped to 70% after vomiting at home

## 2019-05-13 NOTE — ED NOTES
Bed: ST02  Expected date:   Expected time:   Means of arrival:   Comments:  597  CHF Exacerbation on CPAP

## 2019-05-13 NOTE — ED NOTES
Patient resting comfortable at this time .decrease shortness of breath ,continue on bipap.denies chest pain

## 2019-05-13 NOTE — H&P
Admitted:     05/13/2019      PRIMARY CARE PHYSICIAN:  Sukh Owen MD      CODE STATUS:  FULL CODE.      CHIEF COMPLAINT:  Chest pain and shortness of breath.      HISTORY OF PRESENT ILLNESS:  Mr. Raza is a 69-year-old male with a past medical history significant for end-stage renal disease, coronary artery disease, COPD, diabetes, who presents to the Emergency Department with concerns of chest pain and shortness of breath.  History is obtained through discussion with the ED physician, as well as the patient.  Per report, the patient woke up this morning with a reported 10/10 chest discomfort and 10/10 difficulty breathing.  Upon EMS arrival, he was noted to be severely hypertensive with a blood pressure of 210/140.  There was apparently reportedly one episode of emesis and after that, dropped his sats to the 70s and they started him on positive pressure ventilation.  His oxygenation since that time has improved into the low 90s.  EMS gave him 6 nitroglycerins, as well as a full-dose aspirin, and his chest pain had resolved upon admission to the ED.  Blood pressure improved into the 150s-160s and has remained in the 140s to 160s since that time.  He is a dialysis patient, dialyzing 4 times per week on Monday, Tuesday, Thursday and Saturday and did complete dialysis on Saturday, per report.  He is due again today.  He was recently admitted to [facility] from 05/02/2019 to 05/06/2019, discharging approximately 1 week ago after having severe bradycardia requiring a pacemaker.  He also has a history of coronary artery disease and has had at least 2 stents placed in the past.      In the Emergency Department, the patient had a detectable troponin and an EKG that shows a left bundle, which was known previously.  He was placed on BiPAP and started on nitroglycerin with blood pressures improving to the 140s to 160s as above.  His chest pain is not present currently and his breathing is improved after  initiation of the BiPAP and nitroglycerin.  Cardiology and Nephrology were both alerted to him being admitted.  Heparin has been ordered.      In reviewing the medical record, I do note that he actually just had an echocardiogram on 05/02/2019 showing an EF of 60% to 65% with normal right ventricular function and mild aortic stenosis.  He did have a slight drop in hemoglobin requiring a blood transfusion during his hospitalization earlier this month, but had a GI evaluation, and it was thought all due to his end-stage renal disease.  It sounds like he has been doing fairly well since discharge.  I will note that the history is somewhat limited at this point as the patient is on BiPAP and he is currently rather somnolent from being up all night.  He does not appear to have received any narcotics or sedating medications that I can see in his chart at this point.  He does not have any pain, however, at this point.      PAST MEDICAL HISTORY:   1.  End-stage renal disease, dialyzing Monday, Tuesday, Thursday and Saturday.   2.  Diabetes, on insulin.   3.  COPD.   4.  Coronary artery disease with stent to the LAD and OM1.   5.  Left bundle branch block.   6.  Colon cancer.   7.  Pulmonary hypertension.   8.  Mild aortic stenosis.   9.  TIA.   10.  HIV/AIDS.   11.  Gout.   12.  Depression.   13.  Erectile dysfunction.   14.  Dyslipidemia.   15.  Anemia.   16.  Hypertension.   17.  Bowen's disease.      MEDICATIONS:    Medications Prior to Admission   Medication Sig Dispense Refill Last Dose     abacavir (ZIAGEN) 300 MG tablet Take 600 mg by mouth every evening    5/12/2019 at pm     Acetaminophen (TYLENOL PO) Take 325 mg by mouth every 6 hours as needed for mild pain or fever    prn     albuterol (PROAIR HFA/PROVENTIL HFA/VENTOLIN HFA) 108 (90 BASE) MCG/ACT Inhaler Inhale 2 puffs into the lungs every 6 hours as needed for shortness of breath / dyspnea or wheezing 1 Inhaler 1 prn     aspirin EC 81 MG EC tablet Take 1 tablet  (81 mg) by mouth daily 30 tablet 0 5/12/2019     atorvastatin (LIPITOR) 40 MG tablet Take 40 mg by mouth At Bedtime   5/12/2019 at hs     B COMPLEX-C-FOLIC ACID PO Take 1 tablet by mouth At Bedtime    5/12/2019 at hs     carvedilol (COREG) 12.5 MG tablet Take 1 tablet (12.5 mg) by mouth 2 times daily (with meals) 60 tablet 0 5/12/2019 at pm     chlorhexidine (CHLORHEXIDINE) 0.12 % solution Rinse and gargle 15 ml by mouth twice a day as directed.   5/12/2019 at pm     CLONAZEPAM PO Take 0.5 mg by mouth nightly as needed for anxiety (restless legs)   prn     CLOPIDOGREL BISULFATE PO Take 75 mg by mouth every evening    5/12/2019 at am     dapsone 100 MG tablet Take 100 mg by mouth At Bedtime    5/12/2019 at pm     Darunavir Ethanolate (PREZISTA PO) Take 800 mg by mouth At Bedtime.   5/12/2019 at hs     dolutegravir (TIVICAY) 50 MG tablet Take 50 mg by mouth At Bedtime   5/12/2019 at hs     DOXERCALCIFEROL IV Inject 6 mcg into the vein three times a week (with dialysis)   5/10/2019     epoetin brittni (EPOGEN,PROCRIT) 23801 UNIT/ML injection Inject 11,000 Units Subcutaneous three times a week WITH DIALYSIS   5/10/2019     gabapentin (NEURONTIN) 300 MG capsule Take 300 mg by mouth as needed May take after HD   prn     gabapentin (NEURONTIN) 300 MG capsule Take 300 mg by mouth 2 times daily   5/12/2019 at pm     guaiFENesin (MUCINEX) 600 MG 12 hr tablet Take 1,200 mg by mouth 2 times daily as needed    prn     hydrALAZINE (APRESOLINE) 25 MG tablet Take 1 tablet (25 mg) by mouth 3 times daily 90 tablet 0 5/12/2019 at pm     imiquimod (ALDARA) 5 % cream Apply topically as needed   prn     insulin aspart (NOVOLOG FLEXPEN) 100 UNIT/ML pen Inject 10 Units Subcutaneous 3 times daily (with meals)   5/12/2019 at pm     insulin glargine (LANTUS SOLOSTAR PEN) 100 UNIT/ML pen Inject 20 Units Subcutaneous 2 times daily 12 mL 0 5/12/2019 at pm     ipratropium - albuterol 0.5 mg/2.5 mg/3 mL (DUONEB) 0.5-2.5 (3) MG/3ML neb solution Take 1  "vial (3 mLs) by nebulization every 6 hours as needed for shortness of breath / dyspnea or wheezing 90 vial 1 prn     irbesartan (AVAPRO) 300 MG tablet Take 1 tablet (300 mg) by mouth At Bedtime 30 tablet 0 5/12/2019 at hs     iron sucrose (VENOFER) 20 MG/ML injection Inject 50 mg into the vein once a week WIth dialysis   5/10/2019     Isosorbide Mononitrate  MG TB24 Take 1 tablet (120 mg) by mouth every evening 30 tablet 11 5/12/2019 at pm     ketoconazole (NIZORAL) 2 % cream Apply topically daily    5/12/2019     MAGNESIUM OXIDE PO Take 400 mg by mouth At Bedtime   5/12/2019 at hs     melatonin 5 MG tablet Take 5 mg by mouth At Bedtime   5/12/2019 at hs     mirtazapine (REMERON) 15 MG tablet Take 15 mg by mouth At Bedtime   5/12/2019 at hs     nitroglycerin (NITROSTAT) 0.4 MG SL tablet One tablet under the tongue every 5 minutes if needed for chest pain. May repeat every 5 minutes for a maximum of 3 doses in 15 minutes\" 25 tablet 3 prn     Nutritional Supplements (NEPRO PO) 1-3 times daily   1/28/2019 at Unknown time     omega-3 acid ethyl esters (LOVAZA) 1 G capsule Take 2 g by mouth 2 times daily   5/12/2019     pantoprazole (PROTONIX) 40 MG EC tablet Take 40 mg by mouth daily   5/12/2019 at am     polyethylene glycol (MIRALAX/GLYCOLAX) packet Take 1 packet by mouth daily as needed for constipation   prn     ritonavir (NORVIR) 100 MG capsule Take 1 capsule by mouth At Bedtime    5/12/2019 at hs     sucroferric oxyhydroxide (VELPHORO) 500 MG CHEW chewable tablet Take 500 mg by mouth 3 times daily (with meals)   5/12/2019 at pm           SOCIAL HISTORY:  The patient quit smoking in 2003 and no reported alcohol use.      FAMILY HISTORY:  Brother had heart disease and a sister had kidney disease.      ALLERGIES:  LISINOPRIL, SULFA, CONTRAST DYE AND CALCIUM ACETATE.      REVIEW OF SYSTEMS:  Unable to truly obtain as the patient is fairly sleepy at this point and is preferring to rest as opposed to having an " extensive conversation.      PHYSICAL EXAMINATION:   VITAL SIGNS:  Blood pressure 157/66, heart rate 68, respirations 14, satting 98% on BiPAP, temperature 97.3 degrees Fahrenheit.   GENERAL:  The patient is lying in bed with BiPAP in place and resting.  He appears comfortable.   HEENT:  Pupils equal and reactive to light.  Extraocular muscle function intact.  No scleral icterus.  Oropharynx is clear.   NECK:  No lymphadenopathy or thyromegaly.   CARDIOVASCULAR:  Heart is regular rate and rhythm with a 2/6 systolic murmur heard best at the right sternal border.   PULMONARY:  Clear to auscultation bilaterally anteriorly.  BiPAP is currently in place.   ABDOMEN:  Positive bowel sounds.  Soft, nontender and nondistended.   SKIN:  He has a fistula of his left arm and currently has an IV in his right foot.  He has some scattered bruising, mostly on the abdomen.   PSYCHIATRIC:  The patient is somnolent but easily opens eyes to voice.   NEUROLOGIC:  Cranial nerves II-XII are grossly intact.  The patient moves all extremities.      LABORATORY DATA:  WBC count 8.4, hemoglobin 9.0 and platelets of 157.  Sodium 129, potassium 5.3, chloride 94, CO2 of 22, BUN 23, creatinine 9.6.  Troponin of 0.048.  TSH of 4.4.  BNP of 24,000.      EKG shows left bundle branch block and left axis deviation.      Chest x-ray shows pulmonary edema.      ASSESSMENT AND PLAN:  Mr. Raza is a 69-year-old male with a past medical history significant for end-stage renal disease, dialyzing 4 times per week, diabetes, coronary artery disease, chronic obstructive pulmonary disease, HIV, who presents to the Emergency Department with chest pain and shortness of breath.      1.  Acute hypoxic respiratory failure:  The patient likely has flash pulmonary edema from hypertensive emergency and has improved with nitroglycerin, as well as BiPAP.  The patient will need potentially titration of his blood pressure medications once we confirm that he has been  taking them as prescribed and will need dialysis for volume removal.  We will plan to continue with nitroglycerin drip for now, as well as BiPAP.  We will continue with his oral antihypertensive regimen once confirmed and he wakes up a bit.  There is no evidence of a respiratory infection or definitive chronic obstructive pulmonary disease exacerbation at this point.   2.  Chest pain with possible acute coronary syndrome:  Troponin is detectable, though difficult to interpret in his renal failure.  Certainly could be some demands from his hypertensive emergency.  He does have a history of 2 stents and recently had a pacemaker placed.  He has been initiated on heparin in the Emergency Department.  We will trend troponins, obtain Cardiology consultation.  He is currently on a nitroglycerin drip and is chest pain-free.   3.  End-stage renal disease:  He dialyses 4 times per week on Monday, Tuesday, Thursday and Saturday.  Nephrology has been consulted for dialysis today.   4.  Diabetes:  He is currently n.p.o. on BiPAP.  I have placed him on a sliding scale and we will await reconciliation of his medications before considering resuming some sort of basal insulin.   5.  HIV/AIDS:  We will continue with his HIV regimen once confirmed and also consult Infectious Disease.   6.  Chronic obstructive pulmonary disease:  I have ordered p.r.n. DuoNebs.  Wean BiPAP and treat respiratory failure as above.   7.  Coronary artery disease:  Currently on heparin and trending troponins with a Cardiology consult as above.  Continue with Plavix, aspirin, Coreg, statin and Imdur.   8.  Gastroesophageal reflux disease.  Continue with PPI.   9.  Deep venous thrombosis prophylaxis:  He is currently anticoagulated with heparin.   10.  Chronic anemia:  Monitor for any evidence of bleeding.  I am going to recheck hemoglobin this evening as we are initiating heparin.         JONY LARA DO             D: 05/13/2019   T: 05/13/2019   MT: CASSIE       Name:     GREGORIO BRUSH   MRN:      [id number]        Account:      [id number]   :      1950        Admitted:     2019                   Document: [id number]       cc: Sukh Owen MD

## 2019-05-13 NOTE — PLAN OF CARE
VSS, denies pain, denies SOB, Bipap weaned off and currently on 5L oxymizer and tolerating well.  Dialysis run today with 3.5L off, pt tolerated well.  Cardiology assessed pt, meds adjusted, they signed off. Pt repositioned frequently, declined to get out of bed at this time.  Currently eating and doing well with oxygen.  Cardiology aware of elevated troponins and state no further troponins are needed at this time, patient is pain free and denies CP post dialysis.  Continue to monitor.

## 2019-05-13 NOTE — PROGRESS NOTES
Potassium   Date Value Ref Range Status   05/13/2019 5.3 3.4 - 5.3 mmol/L Final     Lab Results   Component Value Date    HGB 9.0 05/13/2019     Weight: 93 kg (205 lb 0.4 oz)  POST WT 89.5kg  DIALYSIS PROCEDURE NOTE  Hepatitis status of previous patient on machine log was checked and verified ok to use with this patients hepatitis status.  Patient dialyzed for 3 hrs. on a 2 K bath with a net fluid removal of  3.5L.  A BFR of 450 ml/min was obtained via a LAVF using 15gauge needles. The patient was seen by Dr. Marcial during the treatment.  Total heparin received during the treatment: On gtt for 2 hours @ 1000 units/hr. Sites held x 10 min then  pressure drsgs applied.    Meds  Given: None. Complications: None.  Procedure and ESRD teaching provided, patient verbalized understanding.   See flowsheet in EPIC for further details and post assessment.  Machine water alarm in place and functioning. Transducer pods intact and checked every 15min.  Pt dialyzed at bedside in ICU  Vascular Access: Aseptic prep done for both on/off.  Report received from: JAMAL Nunez RN  Report given to: WILLIS Niño RN  HEPATITIS B SURFACE ANTIGEN Non-Reactive DATE 1/29/19    HEPATITIS B SURFACE ANTIBODY Immune DATE 1/29/19  Chlorine/Chloramine water system checked every 4 hours.  Outpatient Dialysis at [facility]

## 2019-05-13 NOTE — ED PROVIDER NOTES
History     Chief Complaint:  Chest Pain & Shortness of Breath    HPI   Donald Raza is a 69 year old male with a history of CAD, unstable angina, COPD, AIDS, and ESRD, on dialysis, who presents via EMS for evaluation of chest pain and dyspnea. The patient reports waking up today with 10/10 chest pain and 10/10 difficulty breathing. With the sudden onset of symptoms, his family called EMS. On the paramedics arrival, they report he was hypertensive to 210/140 and statting in the 90s on room air. They report he vomited once after which his oxygen levels dropped to the 70s, prompting them to start him on a CPAP. Since being on oxygen, his oxygen has improved to 92-93%. En route to the ED, EMS administered 6 nitroglycerins and 324 mg aspirin and his chest pain has completely resolved on arrival; his blood pressure also came down to 150/60 for the paramedics. Initially, the paramedic's twelve leads showed a STEMI, but repeat ones did not show those elevations; the leads did show a paced rhythm however. Here, the patient reports he still has some shortness of breath, but it is down to a 4/10 on CPAP. He also reports a cough that started at the same time as the chest pain and dyspnea. He denies any recurrent vomiting since the first episode. He does reports a past myocardial infarction which occurred about 2-3 years ago, but denies any known history of CHF or asthma. Of note, he reports the pacemaker was placed about 2 weeks ago. He is also due for dialysis this morning as he goes Monday, Tuesday, Thursday, and Saturday; his last session was 2 days ago.     Allergies:  Calcium Acetate  Contrast Dye  Lisinopril  Sulfa     Medications:    Abacavir  Albuterol inhaler  Baby aspirin  Atorvastatin  Carvedilol  Clonazepam  Plavix  Dapsone  Prezista  Dolutegravir  Gabapentin  Hydralazine  Insulin  Irbesartan  Imdur  Remeron  Norvir   Velphoro     Past Medical History:    CAD  CKD  Colon  cancer  COPD  Depression  Anemia  ESRD  Gout  HIV  HTN  Hyperlipidemia  Pulmonary HTN  TIA  Type II diabetes  Unstable angina  AIDS  Dilated aortic root    Past Surgical History:    Pacemaker implant  Appendectomy  Cholecystectomy  Cono surgery   Dialysis fistulogram  Coronary stents placed x2  Heart cath, angioplasty    Family History:    Heart disease  Kidney disease  HTN    Social History:  Marital Status:   [2]  Presents via EMS  Former smoker  Negative for alcohol use.      Review of Systems   Respiratory: Positive for shortness of breath.    Cardiovascular: Positive for chest pain. Negative for leg swelling.   Gastrointestinal: Positive for vomiting (one episode).   All other systems reviewed and are negative.      Physical Exam     Patient Vitals for the past 24 hrs:   BP Temp Temp src Pulse Heart Rate Resp SpO2 Weight   05/13/19 0700 157/66 -- -- 62 68 (!) 0 98 % --   05/13/19 0640 165/70 -- -- -- 64 15 98 % --   05/13/19 0630 166/72 -- -- 61 60 21 98 % --   05/13/19 0615 175/77 -- -- -- 65 12 97 % --   05/13/19 0544 (!) 168/94 97.3  F (36.3  C) Temporal 89 -- 22 97 % 92.1 kg (203 lb)      Physical Exam  Nursing note and vitals reviewed.  Constitutional:  Appears well-developed and well-nourished.  Moderate respiratory distress, on BIPAP  HENT:   Head:    Atraumatic.   Mouth/Throat:   Oropharynx is clear and moist. No oropharyngeal exudate.   Eyes:    Pupils are equal, round, and reactive to light.   Neck:    Normal range of motion. Neck supple.      No tracheal deviation present. No thyromegaly present.   Cardiovascular:  Normal rate, regular rhythm, no murmur   Pulmonary/Chest: Bibasilar crackles.  Tachypneic  Abdominal:   Soft. Bowel sounds are normal. Exhibits no distension and      no mass. There is no tenderness.      There is no rebound and no guarding.   Musculoskeletal:  Exhibits no edema.   Lymphadenopathy:  No cervical adenopathy.   Neurological:   Alert and oriented to person, place, and  time. GCS 15.  CN 2-12 intact.  and proximal upper extremity strength strong and equal.  Bilateral lower extremity strength strong and equal, including strong dorsiflexion and plantarflexion strength.  Sensation intact and equal to the face, arms and legs.  No facial droop or weakness. Normal speech.  Follows commands and answers questions normally.    Skin:    Skin is warm and dry. No rash noted. No pallor.     Emergency Department Course   ECG:  Indication: Chest Pain  Time: 0535  Vent. Rate 99 bpm. HI interval *. QRS duration 182. QT/QTc 418/536. P-R-T axis * -48 111.    Wide QRS rhythm.  Left axis deviation.   Left BBB.  Abnormal ECG. Read time: 0535.    Time: 0657  Vent. Rate 71 bpm. HI interval 214. QRS duration 162. QT/QTc 496/538. P-R-T axis 33 -68 110.    Atrial-sensed ventricular-paced rhythm with prolonged aV conduction.  Abnormal ECG. Read time: 0700.    Imaging:  Radiographic findings were communicated with the patient who voiced understanding of the findings.  XR Chest Portable 1 view:   Pulmonary edema, as per radiology.     Laboratory:  CBC: WBC: 8.4, HGB: 9.0 (L), PLT: 157  BMP: Glucose 280 (H), Na: 129 (L), BUN: 123 (H), GFR: 5 (L), o/w WNL (Creatinine: 9.6 (H))    0600 Troponin: 0.043  BNP: 24,437 (H)  INR: 1.10  TSH: 4.48 (H)  T4 free: 0.70 (L)    Interventions:  0620 Lasix, 60 mg, IV injection  0627 Nitroglycerin, 0.1 mcg/kg/min, IV infusion  0652 Nitroglycerin, 0.2 mcg/kg/min, Rate change  0720 Nitroglycerin, 0.3 mcg/kg/min, Rate change    Please see MAR for full list of medications administered in the ED.     Emergency Department Course:  (0531) Patient arrived via EMS. Paramedic notes and vitals reviewed. I performed an exam of the patient as documented above.     Patient was placed on BiPAP in the emergency department.      (0535) EKG obtained in the ED, see results above.     (0542) Portable chest x-ray was taken in the emergency department, findings above.     IV inserted. Medicine  administered as documented above. Blood drawn. This was sent to the lab for further testing, results above.     (0607) I consulted with Dr. Dixon, cardiology, regarding the patient's history and presentation here in the emergency department.      (0625) I rechecked the patient and discussed the results of his workup thus far.      Repeat EKG obtained in the ED, see results above.      (0636) The patient is resting comfortably on BiPAP and continues to deny chest pain.     (0655) I consulted with Dr. Hamilton, Nephrology, regarding the patient's history and presentation here in the emergency department. He agrees to consult on the patient's case once admitted.     (2714) I consulted with Dr. Ocampo of the hospitalist services. They are in agreement to accept the patient for admission and agrees that he should be started on a heparin drip.     Findings and plan explained to the Patient who consents to admission. Discussed the patient with Dr. Ocampo, who will admit the patient to a CCU bed for further monitoring, evaluation, and treatment.     I personally reviewed the laboratory and imaging results with the Patient and answered all related questions prior to admission.      Impression & Plan      Medical Decision Making:  Donald Raza is a 69 year old male who was found to have flash pulmonary edema due to hypertensive crisis, and since he was a dialysis patient, I consulted Dr. Hamilton from nephrology to perform dialysis today. He was kept on a nitroglycerin drip and placed on a heparin drip. He has a preexisting left bundle branch block, so he will need to be ruled out for myocardial infarction, although his pain has resolved here. I consulted cardiology to evaluate the EKG who did not feel there was any Brugada criteria. He was admitted to the CCU for further evaluation and treatment.     Critical Care time:  was 45 minutes for this patient excluding procedures.    Diagnosis:    ICD-10-CM    1. Acute  systolic congestive heart failure (H) I50.21 Basic metabolic panel     Troponin I     Nt probnp inpatient     INR     TSH with free T4 reflex     T4 free     T4 free     T4 free     T4 free   2. Hypertensive crisis I16.9        Disposition:  Admitted to the CCU under the care of Dr. Terrence Prince Disclosure:  I, Danelle Gottlieb, am serving as a scribe on 5/13/2019 at 5:37 AM to personally document services performed by Marietta Ambrose MD based on my observations and the provider's statements to me.      Danelle Gottlieb  5/13/2019    EMERGENCY DEPARTMENT       Marietta Ambrose MD  05/13/19 0815

## 2019-05-13 NOTE — PROVIDER NOTIFICATION
MD Notification    Notified Person: MD    Notified Person Name: Dr. Dixon    Notification Date/Time:5/13/19 3241    Notification Interaction: paged about critical troponin    Purpose of Notification: troponin of 0.485    Orders Received: No need to check further troponins or other orders at this time.  Heparin is off and not needed at this time.     Comments:  Pt denies CP- vitals are stable and currently having dialysis.

## 2019-05-13 NOTE — CONSULTS
Consult Date:  05/13/2019      INFECTIOUS DISEASE CONSULTATION      REQUESTING PHYSICIAN:  Bj Blank MD      IMPRESSION:   1.  A 69-year-old male, extremely well known to me from multiple prior admissions, now admitted with worsening respiratory status, no clear infection, probably primarily fluid related.   2.  Chronic HIV infection, followed in the [facility] system on long-term highly active regimen that has been effective at least at controlling viremia, has had T-cell count generally in the low 100s.   3.  Diabetes mellitus.   4.  End-stage renal disease on dialysis.   5.  Chronic obstructive lung disease including multiple prior respiratory failure episodes.   6.  History of recurrent pneumonia, not particularly common recently.   7.  SULFA ALLERGY.      RECOMMENDATIONS:   1.  Continue same HIV regimen including the dapsone for Pneumocystis prevention that has been on historically.   2.  Unclear how recently labs were done in the [facility] system, but still followed there regularly.   3.  It does not appear the current episode is infection related, but follow for this, at times will develop pneumonia in the hospital.      HISTORY:  This 69-year-old male is seen in consultation due to his HIV infection.  The patient is extremely well known to me, although I have not seen him since late 2017.  The patient has a history of numerous hospital admissions, primarily for respiratory failure with multiple underlying conditions including end-stage renal disease, chronic obstructive lung disease, coronary artery disease, congestive heart failure, diabetes mellitus, etc.  The patient's primary care is in the [facility] system, but most all of his hospitalization have been in the [facility] system and thus I have seen him many times.  He is followed at [facility] for his HIV infection which is long-standing and has been reasonably well controlled on highly active program.  He is on an adjusted regimen due to  his renal failure and has been on abacavir, dolutegravir, and darunavir.  On this regimen, he has been undetectable, but has had T cells maintained in only the low 100s.  As a result of this, he has continued on dapsone prophylaxis long-term as well.  In the past, he has had several episodes diagnosed as pneumonia, although always difficult to tell whether it is primarily fluid related and a COPD flare related or infection.  For the most part has not had major bacteremia or similar infections.  He has also in the past had some other infection complications including diagnosed with UTIs, but recently has not had major infection issues in general.      PAST MEDICAL HISTORY:  Longstanding multiple medical problems as outlined in the problem list above.  This includes end-stage renal disease on chronic dialysis, history of diabetes mellitus, history of chronic obstructive lung disease, history of coronary artery disease, prior colon cancer, HIV infection, long-standing as outlined in the HPI above.      ALLERGIES:  SULFA WHICH CAUSED A RASH AS A RESULT, HE IS ON DAPSONE.      MEDICATIONS:  As listed.      REVIEW OF SYSTEMS:  Somewhat difficult.  Currently is on BiPAP and not able to communicate very well, although his cognition seems relatively intact. Review of systems again difficult to obtain due to his current status, but overall nothing particularly acutely to suggest infection including no fevers, chills, sweats, etc.      PHYSICAL EXAMINATION:   GENERAL:  The patient looks in some respiratory distress.  He is on BiPAP.   VITAL SIGNS:  Respiratory rate is 24.  Pulse 110.  He is afebrile.   HEENT:  No thrush or intraoral lesions.  Pupils reactive.   NECK:  Supple and nontender with no lymphadenopathy or thyromegaly.   HEART:  Mildly tachycardic.   LUNGS:  Crackles bilaterally, decreased breath sounds at the right base.   ABDOMEN:  Soft and nontender.   EXTREMITIES:  Some stasis changes.  Mild edema.      LABORATORY  DATA AND IMAGING:  As described.  No particular suggestion of active infection.  No cultures done to date.  White count within normal limits.      Thank you very much for the consultation.  I will follow the patient with you.         ASHOK MI MD             D: 2019   T: 2019   MT: LYNSEY      Name:     GREGORIO BRUSH   MRN:      [id number]        Account:       [id number]   :      1950           Consult Date:  2019      Document: [id number]       cc: Bj Ocampo DO

## 2019-05-13 NOTE — CONSULTS
RENAL CONSULTATION NOTE    REFERRING MD:  Bj Ocampo DO    REASON FOR CONSULTATION:  ESRD    HPI:  69 y.o m an with ESRD, HIV, COPD. CAD and bradycardia s/p PM, who presents with chest pain and acute onset of shortness of breath. Patient woke up with 10/10 chest pain and O2 sat in the 90s. Initial BP BP was 210/140. Pt was given nitroglycerin and ASA. BP and chest pain improved. CXR shows pulmonary edema.     ROS:  A complete review of systems was performed and is negative except as noted above.    PMH:    Past Medical History:   Diagnosis Date     Allergic rhinitis      Anemia due to chronic kidney disease 10/21/2011     CAD S/P percutaneous coronary angioplasty 6/15/2015     Cataract      CKD (chronic kidney disease)      Colon cancer (H)      COPD (chronic obstructive pulmonary disease) (H)      Depression      Dilated aortic root (H) 5/6/2016     Diverticulitis      ESRD (end stage renal disease) on dialysis (H) 5/6/2016     Gout      Human immunodeficiency virus (HIV) disease (H)      Hx of squamous cell carcinoma 03/23/2007     Hypertension 2010     Impotence of organic origin      Increased prostate specific antigen (PSA) velocity 08/08/2016    Awaiting bx on blood thinner     Mixed hyperlipidemia      Pulmonary HTN (H)     Mod     TIA (transient ischemic attack) 5/2016     Type 2 diabetes mellitus (H) age 50       PSH:    Past Surgical History:   Procedure Laterality Date     ANGIOGRAM  03-04-16    No culprit lesions, stents widely patent      ANGIOGRAM  05-06-16    Cutting balloon ptca=Diag     APPENDECTOMY  2000     CHOLECYSTECTOMY, LAPOROSCOPIC       COLON SURGERY       COLOSTOMY  09/30/1999    Temporary for diverticulitis     EP PACEMAKER N/A 5/2/2019    Procedure: EP Pacemaker;  Surgeon: Angelique Perera MD;  Location:  HEART CARDIAC CATH LAB     HC LEFT HEART CATHETERIZATION  03/12/2018    No significant change  Elevated LVEDp  LVEF 30% No PCI     HEART CATH, ANGIOPLASTY   16    LAD PCI. Stented with a 3.0 x 8 mm Xience Alpine stent.     IR DIALYSIS FISTULOGRAM LEFT  2019     STENT, CORONARY, S660 15/18  2015    VANITA=Diag, PTCA=LAD     STENT, CORONARY, S660 15/18  2015    VANITA=LAD       MEDICATIONS:      sodium chloride 0.9%  250 mL Intravenous Once in dialysis     sodium chloride 0.9%  300 mL Hemodialysis Machine Once     hydrALAZINE  10 mg Intravenous Once     hydrALAZINE  10 mg Intravenous Once     - MEDICATION INSTRUCTIONS -   Does not apply Once     sodium chloride (PF)  3 mL Intracatheter Q8H       ALLERGIES:    Allergies as of 2019 - Reviewed 2019   Allergen Reaction Noted     Calcium acetate Other (See Comments) 2017     Contrast dye Other (See Comments) 2014     Diagnostic x-ray materials Other (See Comments) 2012     Lisinopril  2012     Sulfa drugs  2012       FH:    Family History   Problem Relation Age of Onset     Heart Disease Brother 40        CABG     Kidney Disease Sister      Hypertension Sister      Heart Disease Brother         Dilated aorta       SH:    Social History     Socioeconomic History     Marital status:      Spouse name: Not on file     Number of children: Not on file     Years of education: Not on file     Highest education level: Not on file   Occupational History     Not on file   Social Needs     Financial resource strain: Not on file     Food insecurity:     Worry: Not on file     Inability: Not on file     Transportation needs:     Medical: Not on file     Non-medical: Not on file   Tobacco Use     Smoking status: Former Smoker     Packs/day: 2.00     Years: 41.00     Pack years: 82.00     Types: Cigarettes     Last attempt to quit:      Years since quittin.3     Smokeless tobacco: Never Used   Substance and Sexual Activity     Alcohol use: No     Alcohol/week: 0.0 oz     Drug use: No     Sexual activity: Not on file   Lifestyle     Physical activity:     Days per week: Not  on file     Minutes per session: Not on file     Stress: Not on file   Relationships     Social connections:     Talks on phone: Not on file     Gets together: Not on file     Attends Latter day service: Not on file     Active member of club or organization: Not on file     Attends meetings of clubs or organizations: Not on file     Relationship status: Not on file     Intimate partner violence:     Fear of current or ex partner: Not on file     Emotionally abused: Not on file     Physically abused: Not on file     Forced sexual activity: Not on file   Other Topics Concern     Parent/sibling w/ CABG, MI or angioplasty before 65F 55M? Yes      Service Not Asked     Blood Transfusions Not Asked     Caffeine Concern Yes     Comment: 2 cups daily     Occupational Exposure Not Asked     Hobby Hazards Not Asked     Sleep Concern Yes     Stress Concern Not Asked     Weight Concern Not Asked     Special Diet No     Comment:  more proteins     Back Care Not Asked     Exercise Yes     Comment: Cardiac rehab      Bike Helmet Not Asked     Seat Belt Not Asked     Self-Exams Not Asked   Social History Narrative     Not on file       PHYSICAL EXAM:    BP 99/56   Pulse 60   Temp 97.6  F (36.4  C) (Axillary)   Resp 13   Wt 93 kg (205 lb 0.4 oz)   SpO2 97%   BMI 28.60 kg/m    GENERAL: NAD. Sleeping   HEENT:  Normocephalic. No gross abnormalities.  Pupils equal.  MMM.   CV: RRR, no edema  RESP: BS diminish. No wheezes. On BIPAP.   GI: Abdomen o/s/nt/nd, BS present. No masses, organomegaly  MUSCULOSKELETAL: extremities nl - no gross deformities noted. No edema  SKIN: no suspicious lesions or rashes, dry to touch  NEURO:  Sleeping  PSYCH: sleeping  LYMPH: No palpable ant/post cervical     LABS:      CBC RESULTS:     Recent Labs   Lab 05/13/19  0600   WBC 8.4   RBC 3.00*   HGB 9.0*   HCT 28.2*          BMP RESULTS:  Recent Labs   Lab 05/13/19  0600   *   POTASSIUM 5.3   CHLORIDE 94   CO2 22   *   CR  9.60*   *   PRABHA 9.6       INR  Recent Labs   Lab 05/13/19  0600   INR 1.10        DIAGNOSTICS:  Reviewed    A/P:  69 y.o man with ESRD, HIV and CAD, admitted for chest pain and SOB 2/2 pulmonary edema.     # ESRD  # Hypertensiveurgency. Resolved. Off nitroglyerin gtt. SBP in the 90s now.   # Pulmonary edema  # Hyponatremia  # CAD  #HIV    Plan  # 3 hrs HD, going for 3 liters ne nett, 2K. LAVF  # Qb 450  # On heparin gtt    Doyle Marcial MD  [facility] - Nephrology  Office Phone: [phone]  Pager: [phone]

## 2019-05-13 NOTE — CONSULTS
Inpatient Cardiology Consultation   [facility]  Date of Admission:2019  Date of consult: 2019      RECOMMENDATIONS:  Inpatient cardiology consultation note has been dictated. Dictation number [id number]        Harris Dixon MD [facility]  Cardiology      REVIEW OF SYSTEMS:  A comprehensive 10 point review of systems was completed and the pertinent positives are documented in history of present illness.    MEDICATIONS:  Prior to Admission Medications   Prescriptions Last Dose Informant Patient Reported? Taking?   Acetaminophen (TYLENOL PO)  Self Yes No   Sig: Take 325 mg by mouth every 6 hours as needed for mild pain or fever    B COMPLEX-C-FOLIC ACID PO  Self Yes No   Sig: Take 1 tablet by mouth At Bedtime    CLONAZEPAM PO  Self Yes No   Sig: Take 0.5 mg by mouth nightly as needed for anxiety (restless legs)   CLOPIDOGREL BISULFATE PO  Self Yes No   Sig: Take 75 mg by mouth every evening    DOXERCALCIFEROL IV  Self Yes No   Sig: Inject 6 mcg into the vein three times a week (with dialysis)   Darunavir Ethanolate (PREZISTA PO)  Self Yes No   Sig: Take 800 mg by mouth At Bedtime.   Isosorbide Mononitrate  MG TB24  Self No No   Sig: Take 1 tablet (120 mg) by mouth every evening   MAGNESIUM OXIDE PO  Self Yes No   Sig: Take 400 mg by mouth At Bedtime   Nutritional Supplements (NEPRO PO)  Self Yes No   Si-3 times daily   abacavir (ZIAGEN) 300 MG tablet  Self Yes No   Sig: Take 600 mg by mouth every evening    albuterol (PROAIR HFA/PROVENTIL HFA/VENTOLIN HFA) 108 (90 BASE) MCG/ACT Inhaler  Self No No   Sig: Inhale 2 puffs into the lungs every 6 hours as needed for shortness of breath / dyspnea or wheezing   aspirin EC 81 MG EC tablet  Self No No   Sig: Take 1 tablet (81 mg) by mouth daily   atorvastatin (LIPITOR) 40 MG tablet  Self Yes No   Sig: Take 40 mg by mouth At Bedtime   carvedilol (COREG) 12.5 MG tablet  Self No No   Sig: Take 1 tablet (12.5 mg) by mouth 2 times daily (with meals)    chlorhexidine (CHLORHEXIDINE) 0.12 % solution  Self Yes No   Sig: Rinse and gargle 15 ml by mouth twice a day as directed.   dapsone 100 MG tablet  Self Yes No   Sig: Take 100 mg by mouth At Bedtime    dolutegravir (TIVICAY) 50 MG tablet  Self Yes No   Sig: Take 50 mg by mouth At Bedtime   epoetin brittni (EPOGEN,PROCRIT) 23225 UNIT/ML injection  Self Yes No   Sig: Inject 11,000 Units Subcutaneous three times a week WITH DIALYSIS   gabapentin (NEURONTIN) 300 MG capsule   Yes No   Sig: Take 300 mg by mouth 2 times daily   gabapentin (NEURONTIN) 300 MG capsule   Yes No   Sig: Take 300 mg by mouth as needed May take after HD   guaiFENesin (MUCINEX) 600 MG 12 hr tablet   Yes No   Sig: Take 1,200 mg by mouth 2 times daily as needed    hydrALAZINE (APRESOLINE) 25 MG tablet  Self Yes No   Sig: Take 1 tablet (25 mg) by mouth 3 times daily   imiquimod (ALDARA) 5 % cream  Self Yes No   Sig: Apply topically as needed   insulin aspart (NOVOLOG FLEXPEN) 100 UNIT/ML pen   Yes No   Sig: Inject 10 Units Subcutaneous 3 times daily (with meals)   insulin glargine (LANTUS SOLOSTAR PEN) 100 UNIT/ML pen   Yes No   Sig: Inject 20 Units Subcutaneous 2 times daily   ipratropium - albuterol 0.5 mg/2.5 mg/3 mL (DUONEB) 0.5-2.5 (3) MG/3ML neb solution  Self No No   Sig: Take 1 vial (3 mLs) by nebulization every 6 hours as needed for shortness of breath / dyspnea or wheezing   irbesartan (AVAPRO) 300 MG tablet  Self No No   Sig: Take 1 tablet (300 mg) by mouth At Bedtime   iron sucrose (VENOFER) 20 MG/ML injection  Self Yes No   Sig: Inject 50 mg into the vein once a week WIth dialysis   ketoconazole (NIZORAL) 2 % cream  Self Yes No   Sig: Apply topically daily    melatonin 5 MG tablet   Yes No   Sig: Take 5 mg by mouth At Bedtime   mirtazapine (REMERON) 15 MG tablet  Self Yes No   Sig: Take 15 mg by mouth At Bedtime   nitroglycerin (NITROSTAT) 0.4 MG SL tablet  Self No No   Sig: One tablet under the tongue every 5 minutes if needed for chest  "pain. May repeat every 5 minutes for a maximum of 3 doses in 15 minutes\"   omega-3 acid ethyl esters (LOVAZA) 1 G capsule  Self Yes No   Sig: Take 2 g by mouth 2 times daily   pantoprazole (PROTONIX) 40 MG EC tablet  Self Yes No   Sig: Take 40 mg by mouth daily   polyethylene glycol (MIRALAX/GLYCOLAX) packet   Yes No   Sig: Take 1 packet by mouth daily as needed for constipation   ritonavir (NORVIR) 100 MG capsule  Self Yes No   Sig: Take 1 capsule by mouth At Bedtime    sucroferric oxyhydroxide (VELPHORO) 500 MG CHEW chewable tablet  Self Yes No   Sig: Take 500 mg by mouth 3 times daily (with meals)      Facility-Administered Medications: None       ALLERGIES:  Allergies   Allergen Reactions     Calcium Acetate Other (See Comments)     Other reaction(s): Other, see comments  Pain in chest and back  Pain in chest area (sensitive skin)      Contrast Dye Other (See Comments)     Contrast nephropathy     Diagnostic X-Ray Materials Other (See Comments)     PN: renal failure     Lisinopril      Sulfa Drugs        PAST MEDICAL HISTORY:  Past Medical History:   Diagnosis Date     Allergic rhinitis      Anemia due to chronic kidney disease 10/21/2011     CAD S/P percutaneous coronary angioplasty 6/15/2015     Cataract      CKD (chronic kidney disease)      Colon cancer (H)      COPD (chronic obstructive pulmonary disease) (H)      Depression      Dilated aortic root (H) 5/6/2016     Diverticulitis      ESRD (end stage renal disease) on dialysis (H) 5/6/2016     Gout      Human immunodeficiency virus (HIV) disease (H)      Hx of squamous cell carcinoma 03/23/2007     Hypertension 2010     Impotence of organic origin      Increased prostate specific antigen (PSA) velocity 08/08/2016    Awaiting bx on blood thinner     Mixed hyperlipidemia      Pulmonary HTN (H)     Mod     TIA (transient ischemic attack) 5/2016     Type 2 diabetes mellitus (H) age 50       PAST SURGICAL HISTORY:  Past Surgical History:   Procedure Laterality " Date     ANGIOGRAM  03-04-16    No culprit lesions, stents widely patent      ANGIOGRAM  05-06-16    Cutting balloon ptca=Diag     APPENDECTOMY  2000     CHOLECYSTECTOMY, LAPOROSCOPIC       COLON SURGERY       COLOSTOMY  09/30/1999    Temporary for diverticulitis     EP PACEMAKER N/A 5/2/2019    Procedure: EP Pacemaker;  Surgeon: Angelique Perera MD;  Location:  HEART CARDIAC CATH LAB     HC LEFT HEART CATHETERIZATION  03/12/2018    No significant change  Elevated LVEDp  LVEF 30% No PCI     HEART CATH, ANGIOPLASTY  08-18-16    LAD PCI. Stented with a 3.0 x 8 mm Xience Alpine stent.     IR DIALYSIS FISTULOGRAM LEFT  1/25/2019     STENT, CORONARY, S660 15/18  12/2015    VANITA=Diag, PTCA=LAD     STENT, CORONARY, S660 15/18  06/2015    VANITA=LAD       SOCIAL HISTORY:   Donald Raza  reports that he quit smoking about 16 years ago. His smoking use included cigarettes. He has a 82.00 pack-year smoking history. He has never used smokeless tobacco. He reports that he does not drink alcohol or use drugs.    FAMILY HISTORY:  Family History   Problem Relation Age of Onset     Heart Disease Brother 40        CABG     Kidney Disease Sister      Hypertension Sister      Heart Disease Brother         Dilated aorta       PHYSICAL EXAMINATION:  Temp: 97.6  F (36.4  C) Temp src: Axillary BP: 108/58 Pulse: 65 Heart Rate: 63 Resp: 18 SpO2: 97 % O2 Device: BiPAP/CPAP    No intake/output data recorded.  Vitals:    05/13/19 0544 05/13/19 0900   Weight: 92.1 kg (203 lb) 93 kg (205 lb 0.4 oz)

## 2019-05-13 NOTE — PROVIDER NOTIFICATION
MD Notification    Notified Person: MD    Notified Person Name:Dr. Herrera     Notification Date/Time: 5/13 1845    Notification Interaction: text page    Purpose of Notification: No orders for plavix, please order if needed    Orders Received:Dr. Herrera will order for tonight  Comments:

## 2019-05-13 NOTE — CONSULTS
ID consult dictated IMP 1 70 yo male , well known to me, acute resp insuff, not clear infection    REC continue same HIV regimen, dapsone

## 2019-05-13 NOTE — PROGRESS NOTES
As the on-call cardiologist, I was paged by the ER physician, for a curbside opinion regarding the ECG.    The patient is a 69-year-old male with history of hypertension, end-stage renal disease on dialysis, CAD status post PCI's in the past and stable diseae on recent coronary angiogram in 2018, type 2 diabetes mellitus, HIV, and recent hospitalization for symptomatic second-degree AV block in the context of underlying LBBB - for which he underwent a dual-chamber pacemaker implantation on 5/2/2019, who presented early this morning:  -  after being woken up from sleep with chest pain, acute dyspnea and nausea.    - Paramedics noted that he was significantly hypertensive with a BP of 210/140 mmHg. En route to the ER he had already received 325 mg aspirin and 6 sublingual nitroglycerin, after which his BP improved to 154/60 and chest pain resolved.  Dyspnea improved with CPAP initiation. He is due for dialysis today.  - Chest x-ray showed pulmonary edema.  Stable pacemaker leads position.    RECOMMENDATIONS:  1.  The specific question asked is whether the patient could have a STEMI in the context of a paced rhythm.  A paced rhythm ECG is not interpretable for STEMI.  Per the ER physician, patient's chest pain has resolved.  He has had 2 coronary angiograms recently (October 2017 and March 2018) which showed stable coronary disease and patent LAD stents.  On last cath, LVEDP was elevated at 40 mmHg.  2.  Per the history communicated by ER physician, patient appears to have significant hypertension precipitating angina and heart failure.  Would recommend inpatient hospitalization and treatment of this.  3.  Agree with IV nitroglycerin infusion.  4.  Optimize antihypertensive therapy.  5.  I understand patient is anuric, so diuretic therapy will be less helpful.  Please schedule a dialysis run ASAP.    Thank you.    Dr. SOY Dixon MD [facility]  Cardiology

## 2019-05-14 NOTE — PLAN OF CARE
A&O x3, disoriented to time. VS stable on room air. Lung sounds clear. Baseline numbness in bilateral lower extremities. Up with assist x1 with walker. Patient had dialysis today, 1.5L off. Tele monitored, V-paced with no spikes. Blood glucose 141 this morning, patient declined to have breakfast until after dialysis. Report called to HERRERA Macias on station 88. Patient to transfer to room 817-2. All belongings sent with patient on transfer.

## 2019-05-14 NOTE — PLAN OF CARE
Pt transferred to floor this afternoon.  Stable. Resp status back to baseline, RA.  LS clear.  Gabbie PO well. VSS.  A&Ox4. Up with 1/walker to chair.  Tele with new pacemaker WDL.  Dialysis last two days.

## 2019-05-14 NOTE — PROVIDER NOTIFICATION
MD Notification    Notified Person: MD salinas    Notified Person Name:     Notification Date/Time:5/14/2019 3:06 PM      Notification Interaction:    Purpose of Notification:need transfer orders to all be addressed as pt here now and the orders have not been reconciled.  thanks.  Also, the tele isn't showing pacer spikes, but appears paced. please advise     Orders Received:    Comments:

## 2019-05-14 NOTE — PROGRESS NOTES
Potassium   Date Value Ref Range Status   05/14/2019 5.0 3.4 - 5.3 mmol/L Final     Lab Results   Component Value Date    HGB 8.5 05/14/2019     DIALYSIS PROCEDURE NOTE    Patient dialyzed for 3 hrs on a 2 K bath with a net fluid removal of 1.5L.  A BFR of 400-450ml/min was obtained via LUE AVF with 15 gauge needles. Patient was seen by Dr. Marcial during treatment. Total heparin received during treatment: 0 units.  Meds given: Epogen   Complications: none        Procedure and ESRD teaching done and questions answered. See flowsheet in EPIC for further details and post assessment. Machine water alarm in place and functioning.CHLORINE AND CHLORAMINES CHECK on WATER SYSTEM EVERY 4 hours. Consent signed: yes  PT returned via w/c  Access: Aseptic prep done for both on/off.   Report received from: ROBERT Garcia RN  Report given to: CRESCENCIO Garcia RN    Outpatient Dialysis at [facility] Q M, T, Th, Alin Olsen RN, 5/14/2019, 12:56 PM

## 2019-05-14 NOTE — PROGRESS NOTES
[facility]  Infectious Disease Progress Note          Assessment and Plan:   IMPRESSION:   1.  A 69-year-old male, extremely well known to me from multiple prior admissions, now admitted with worsening respiratory status, no clear infection, probably primarily fluid related.   2.  Chronic HIV infection, followed in the [facility] system on long-term highly active regimen that has been effective at least at controlling viremia, has had T-cell count generally in the low 100s.   3.  Diabetes mellitus.   4.  End-stage renal disease on dialysis.   5.  Chronic obstructive lung disease including multiple prior respiratory failure episodes.   6.  History of recurrent pneumonia, not particularly common recently.   7.  SULFA ALLERGY.      RECOMMENDATIONS:   1.  Continue same HIV regimen including the dapsone for Pneumocystis prevention that has been on historically. On a fairly unusual combo but has worked well incl recent neg viremia  2.  Unclear how recently seen in the [facility] system, but still followed there regularly.   3.  It does not appear the current episode is infection related, but follow for this, at times will develop pneumonia in the hospital. At obvious risk                Interval History:   Stable down to RA, back on historical HIV regimen              Medications:       sodium chloride 0.9%  250 mL Intravenous Once in dialysis     sodium chloride 0.9%  300 mL Hemodialysis Machine Once     abacavir  600 mg Oral QPM     amLODIPine  5 mg Oral BID     aspirin  81 mg Oral Daily     carvedilol  12.5 mg Oral BID w/meals     clopidogrel  75 mg Oral QPM     dapsone  100 mg Oral At Bedtime     darunavir  800 mg Oral At Bedtime     dolutegravir  50 mg Oral At Bedtime     insulin aspart  1-7 Units Subcutaneous TID AC     insulin aspart  1-5 Units Subcutaneous At Bedtime     isosorbide mononitrate  120 mg Oral QPM     - MEDICATION INSTRUCTIONS -   Does not apply Once     ritonavir  100 mg  Oral At Bedtime     sodium chloride (PF)  3 mL Intracatheter Q8H                  Physical Exam:   Blood pressure 102/49, pulse 68, temperature 98.6  F (37  C), temperature source Oral, resp. rate 14, weight 89.5 kg (197 lb 5 oz), SpO2 94 %.  Wt Readings from Last 2 Encounters:   05/14/19 89.5 kg (197 lb 5 oz)   05/06/19 94.3 kg (207 lb 14.4 oz)     Vital Signs with Ranges  Temp:  [97.4  F (36.3  C)-98.8  F (37.1  C)] 98.6  F (37  C)  Pulse:  [60-76] 68  Heart Rate:  [60-80] 69  Resp:  [12-24] 14  BP: ()/(49-82) 102/49  SpO2:  [87 %-99 %] 94 %    Constitutional: Awake, alert, cooperative, no apparent distress   Lungs: Clear to auscultation bilaterally, no crackles or wheezing   Cardiovascular: Regular rate and rhythm, normal S1 and S2, and no murmur noted   Abdomen: Normal bowel sounds, soft, non-distended, non-tender   Skin: No rashes, no cyanosis, no edema   Other:           Data:   All microbiology laboratory data reviewed.  Recent Labs   Lab Test 05/14/19 0528 05/13/19 2015 05/13/19 0600 05/06/19  0546   WBC 5.7  --  8.4 4.8   HGB 8.5* 8.4* 9.0* 7.8*   HCT 26.8*  --  28.2* 24.3*   MCV 95  --  94 95   *  --  157 101*     Recent Labs   Lab Test 05/14/19 0528 05/13/19  0600 05/06/19  0546   CR 7.41* 9.60* 8.69*     Recent Labs   Lab Test 09/07/18  0547   SED 66*     Recent Labs   Lab Test 02/11/19 2008 02/11/19 2001 01/03/19  1359 01/03/19  1242 12/06/18  0920 12/05/18  2213 12/05/18  2140 09/03/18  1824 09/03/18  1820   CULT No growth No growth No growth No growth 10,000 to 50,000 colonies/mL  Streptococcus dysgalactiae serogroup C/G  *  10,000 to 50,000 colonies/mL  mixed urogenital subhash   No growth No growth No growth No growth

## 2019-05-14 NOTE — PROGRESS NOTES
[facility]    Medicine Progress Note - Hospitalist Service       Date of Admission:  5/13/2019  Assessment & Plan   Mr. Raza is a 69-year-old male with a past medical history significant for end-stage renal disease, dialyzing 4 times per week, diabetes, coronary artery disease, chronic obstructive pulmonary disease, HIV, who presents to the Emergency Department with chest pain and shortness of breath.      Acute hypoxic respiratory failure:    The patient likely has flash pulmonary edema from hypertensive emergency and has improved with nitroglycerin, as well as BiPAP.  The patient will need potentially titration of his blood pressure medications once we confirm that he has been taking them as prescribed and will need dialysis for volume removal.  We will continue with his oral antihypertensive regimen once confirmed and he wakes up a bit.  There is no evidence of a respiratory infection or definitive chronic obstructive pulmonary disease exacerbation at this point.   - Improved after nitroglycerin gtt and BIPAP and now on RA  - Continue with HD with nephro    End-stage renal disease:  He dialyses 4 times per week on Monday, Tuesday, Thursday and Saturday.  Nephrology has been consulted for dialysis today.   - HD running this morning    Chronic anemia  - hgb 8.5 5/14, with CAD history transfuse if <8    Troponin elevation with HTN emerg, pulmonary edema, in context of CKD - likely T2 MI / demand ischemia  Recent dual chamber pacemaker implanted 5/2/2019  Hypertension  Troponin is detectable, though difficult to interpret in his renal failure.  He does have a history of 2 stents and recently had a pacemaker placed.  He was initiated on heparin in the Emergency Department.    - cardiology following - appears to be demand ischemia, repeat echo nor angio indicated at this point  - symptoms resolved, BP better  - PTA carvedilol 12.5 bid and imdur 120 daily continued  - Cardiology stopped hydralazine,  added amlodipine 5 bid   (- if BP still out of range, increase carvedilol to 25 bid if HR can tolerate)      Diabetes:    - off bipap this morning, diabetic diet ordered  - sliding scale coverage   I have placed him on a sliding scale and we will await reconciliation of his medications before considering resuming some sort of basal insulin.     HIV/AIDS:    - Will continue with his PTA HIV regimen and appreciate recs from Infectious Disease consult.     Chronic obstructive pulmonary disease:    - p.r.quintin Roa.    - Bipap weaned, now on RA.     Coronary artery disease:    - Off heparin, Cardiology evaluated, no further work up as of now  -  Continue with Plavix, aspirin, Coreg, statin and Imdur.     Gastroesophageal reflux disease.   - Continue with PPI.       Diet: Combination Diet 4103-7213 Calories: Moderate Consistent CHO (4-6 CHO units/meal); Low Saturated Fat Na <2400mg Diet    DVT Prophylaxis: Pneumatic Compression Devices  Christie Catheter: not present  Code Status: Full Code      Disposition Plan   Expected discharge: 1-2 days, can transfer to medical floor or [facility] [place]. PT eval this afternoon.  Entered: Bj Fontaine MD 05/14/2019, 9:10 AM       The patient's care was discussed with the Bedside Nurse and Patient.    Bj Fontaine MD  Hospitalist Service  [facility]    ______________________________________________________________________    Interval History   No complaints this morning, feeling much better. On RA, BP better. Seen in HD. Very pleasant. Denies sob, pain, fevers or chills.    Data reviewed today: I reviewed all medications, new labs and imaging results over the last 24 hours. I personally reviewed no images or EKG's today.    Physical Exam   Vital Signs: Temp: 98.7  F (37.1  C) Temp src: Oral BP: 154/71 Pulse: 68 Heart Rate: 71 Resp: 13 SpO2: 94 % O2 Device: None (Room air) Oxygen Delivery: 2 LPM  Weight: 197 lbs 4.99 oz    Gen: NAD, pleasant  HEENT: Normocephalic, EOMI,  MMM  Resp: no crackles,  no wheezes, no increased work of resp  CV: S1S2 heard, reg rhythm, reg rate, no pedal edema  Abdo: soft, nontender, nondistended, bowel sounds present  Ext: calves nontender, well perfused  Neuro: AAOx3, CN grossly intact, no facial asymmetry      Data   Recent Labs   Lab 05/14/19  0528 05/13/19 2015 05/13/19  1448 05/13/19  1200 05/13/19  0600   WBC 5.7  --   --   --  8.4   HGB 8.5* 8.4*  --   --  9.0*   MCV 95  --   --   --  94   *  --   --   --  157   INR  --   --   --   --  1.10     --   --   --  129*   POTASSIUM 5.0  --   --   --  5.3   CHLORIDE 98  --   --   --  94   CO2 26  --   --   --  22   BUN 80*  --   --   --  123*   CR 7.41*  --   --   --  9.60*   ANIONGAP 9  --   --   --  13   PRABHA 9.8  --   --   --  9.6   *  --   --   --  280*   TROPI  --   --  0.585* 0.485* 0.043     No results found for this or any previous visit (from the past 24 hour(s)).

## 2019-05-14 NOTE — PLAN OF CARE
Patient is alert and oriented, assist of one with walker and gait belt. Denies chest pain and shortness of breath. Weaned off BIPAP on previous shift, able to wean oxygen down this shift from 5L to 2L: may be able to be on RA while awake, but requires oxygen while sleeping-currently does not use home oxygen. Vital signs stable, tele 100% Paced. On hemodialysis M, T,  TH, S. Per patient, will receive dialysis run today.

## 2019-05-14 NOTE — PROGRESS NOTES
Inpatient Dialysis Progress Note        Assessment and Plan:     69 y.o man with ESRD and HIV, admitted for chest pain and pulmonary edema.     # ESRD: 2.5hrs, 88.5 kg, Epogen 72703, Hectorol 2.5 mcg, loading heaprin    # Pulmonary edema. Frequent admission for this. Better after UF.     # CKD-MBD. Hectorol 2.5 mcg    # Hypertension. BP is okay.     # CAD.     Plan.   # 3hrs, UF 1.5 liters if able  # Epogen and Hectorol           Interval History:     Feelings much better. No chest pain on HD. No other complaints.          Dialysis Parameters:     Vitals:    05/13/19 0900 05/14/19 0157 05/14/19 0800   Weight: 93 kg (205 lb 0.4 oz) 89.5 kg (197 lb 5 oz) 89.5 kg (197 lb 5 oz)     I/O last 3 completed shifts:  In: 600 [P.O.:600]  Out: 3500 [Other:3500]  Temp:  [97.4  F (36.3  C)-98.8  F (37.1  C)] 98.6  F (37  C)  Pulse:  [60-76] 68  Heart Rate:  [60-80] 69  Resp:  [12-24] 14  BP: ()/(49-82) 102/49  SpO2:  [87 %-99 %] 94 %    Current Weight: 89.5  Dry Weight: 88.5  Dialysis Temp: 36.5  C  Access Device: AVF  Access Site: left arm  Dialyzer: revaclear  Dialysis Bath: 2K  Sodium Profile: none  UF Goal: 1.5 liters net  Blood Flow Rate (mL/min): 400  Total Treatment Time (hrs): 3hrs  Heparin: none    Review of Systems:        Medications:     EPO dose: 18058 units  Hectorol: 2.5 mcg       Physical Exam:     Vitals were reviewed  Patient Vitals for the past 24 hrs:   BP Temp Temp src Pulse Heart Rate Resp SpO2 Weight   05/14/19 1015 102/49 -- -- -- 69 -- -- --   05/14/19 1000 114/66 -- -- -- 69 -- -- --   05/14/19 0945 90/50 -- -- -- 68 -- -- --   05/14/19 0930 117/59 -- -- -- 64 -- -- --   05/14/19 0915 134/64 -- -- -- 63 -- -- --   05/14/19 0905 134/72 -- -- -- 64 -- -- --   05/14/19 0900 146/79 98.6  F (37  C) Oral -- 70 14 -- --   05/14/19 0815 154/71 97.4  F (36.3  C) Oral 68 71 13 94 % --   05/14/19 0800 -- -- -- -- -- -- -- 89.5 kg (197 lb 5 oz)   05/14/19 0700 -- -- -- -- 80 24 97 % --   05/14/19 0500 160/82  -- -- -- 75 12 96 % --   19 0208 -- -- -- -- 67 17 93 % --   19 0206 -- -- -- -- 70 23 (!) 87 % --   19 0157 -- -- -- -- 65 24 93 % 89.5 kg (197 lb 5 oz)   19 0100 146/69 -- -- 70 73 16 95 % --   19 0000 145/74 -- -- 73 73 17 95 % --   19 2300 140/72 98.7  F (37.1  C) Oral 67 65 20 96 % --   19 2100 149/76 -- -- 67 66 17 97 % --   19 -- -- -- -- 63 22 96 % --   19 195 129/64 -- -- -- 61 17 98 % --   19 129/64 98.8  F (37.1  C) Oral 62 61 17 98 % --   19 1800 149/74 -- -- 74 75 16 99 % --   19 1725 156/79 -- -- 73 -- -- -- --   19 1600 147/78 -- -- 76 76 20 97 % --   19 1500 134/79 97.8  F (36.6  C) Axillary 75 75 20 98 % --   19 1400 139/71 -- -- 70 71 18 98 % --   19 1300 110/62 97.7  F (36.5  C) Axillary 67 68 19 97 % --   19 1245 94/59 97.6  F (36.4  C) Axillary 68 67 18 97 % --   19 1230 99/56 97.9  F (36.6  C) Axillary 66 67 18 96 % --   19 1215 96/60 -- -- 65 64 18 98 % --   19 1205 108/58 -- -- -- 64 18 97 % --   19 1200 108/58 -- -- 65 63 18 97 % --   19 1145 111/59 -- -- 63 64 16 97 % --   19 1130 99/56 -- -- 60 60 13 97 % --   19 1115 105/57 -- -- 60 61 14 98 % --   19 1105 111/59 -- -- 60 60 15 97 % --   19 1100 92/66 -- -- 64 63 13 97 % --       Temperatures:  Current - Temp: 98.6  F (37  C); Max - Temp  Av.1  F (36.7  C)  Min: 97.4  F (36.3  C)  Max: 98.8  F (37.1  C)  Respiration range: Resp  Av.5  Min: 12  Max: 24  Pulse range: Pulse  Av.3  Min: 60  Max: 76  Blood pressure range: Systolic (24hrs), Av , Min:90 , Max:160   ; Diastolic (24hrs), Av, Min:49, Max:82    Pulse oximetry range: SpO2  Av.3 %  Min: 87 %  Max: 99 %  I/O last 3 completed shifts:  In: 600 [P.O.:600]  Out: 3500 [Other:3500]    Intake/Output Summary (Last 24 hours) at 2019 1052  Last data filed at 2019 0100  Gross per 24 hour    Intake 600 ml   Output 3500 ml   Net -2900 ml       Gen: NAD  CV: RRR  Pulm: Poor airflow. No wheezes  Abd: obese, soft  Ext: no bashir  Neuro; a/o x 3          Data:     Recent Labs   Lab Test 05/14/19  0528 05/13/19  0600    129*   POTASSIUM 5.0 5.3   CHLORIDE 98 94   CO2 26 22   ANIONGAP 9 13   * 280*   BUN 80* 123*   CR 7.41* 9.60*   PRABHA 9.8 9.6       Hemoglobin   Date Value Ref Range Status   05/14/2019 8.5 (L) 13.3 - 17.7 g/dL Final              Doyle Jerardo Marcial MD

## 2019-05-14 NOTE — PHARMACY-ADMISSION MEDICATION HISTORY
Admission medication history interview status for the 5/13/2019  admission is complete. See EPIC admission navigator for prior to admission medications     Medication history source reliability:Good    Actions taken by pharmacist (provider contacted, etc):None     Additional medication history information not noted on PTA med list :None    Medication reconciliation/reorder completed by provider prior to medication history? No    Time spent in this activity: 20min    Prior to Admission medications    Medication Sig Last Dose Taking? Auth Provider   abacavir (ZIAGEN) 300 MG tablet Take 600 mg by mouth every evening  5/12/2019 at pm Yes Abstract, Provider   Acetaminophen (TYLENOL PO) Take 325 mg by mouth every 6 hours as needed for mild pain or fever  prn Yes Unknown, Entered By History   albuterol (PROAIR HFA/PROVENTIL HFA/VENTOLIN HFA) 108 (90 BASE) MCG/ACT Inhaler Inhale 2 puffs into the lungs every 6 hours as needed for shortness of breath / dyspnea or wheezing prn Yes Nelson Worthy MD   aspirin EC 81 MG EC tablet Take 1 tablet (81 mg) by mouth daily 5/12/2019 Yes Clemencia Pal MD   atorvastatin (LIPITOR) 40 MG tablet Take 40 mg by mouth At Bedtime 5/12/2019 at hs Yes Unknown, Entered By History   B COMPLEX-C-FOLIC ACID PO Take 1 tablet by mouth At Bedtime  5/12/2019 at hs Yes Reported, Patient   carvedilol (COREG) 12.5 MG tablet Take 1 tablet (12.5 mg) by mouth 2 times daily (with meals) 5/12/2019 at pm Yes Clemencia Pal MD   chlorhexidine (CHLORHEXIDINE) 0.12 % solution Rinse and gargle 15 ml by mouth twice a day as directed. 5/12/2019 at pm Yes Abstract, Provider   CLONAZEPAM PO Take 0.5 mg by mouth nightly as needed for anxiety (restless legs) prn Yes Unknown, Entered By History   CLOPIDOGREL BISULFATE PO Take 75 mg by mouth every evening  5/12/2019 at am Yes Unknown, Entered By History   dapsone 100 MG tablet Take 100 mg by mouth At Bedtime  5/12/2019 at pm Yes Reported, Patient   Darunavir  Ethanolate (PREZISTA PO) Take 800 mg by mouth At Bedtime. 5/12/2019 at hs Yes Reported, Patient   dolutegravir (TIVICAY) 50 MG tablet Take 50 mg by mouth At Bedtime 5/12/2019 at hs Yes Unknown, Entered By History   DOXERCALCIFEROL IV Inject 6 mcg into the vein three times a week (with dialysis) 5/10/2019 Yes Reported, Patient   epoetin brittni (EPOGEN,PROCRIT) 68333 UNIT/ML injection Inject 11,000 Units Subcutaneous three times a week WITH DIALYSIS 5/10/2019 Yes Unknown, Entered By History   gabapentin (NEURONTIN) 300 MG capsule Take 300 mg by mouth as needed May take after HD prn Yes Unknown, Entered By History   gabapentin (NEURONTIN) 300 MG capsule Take 300 mg by mouth 2 times daily 5/12/2019 at pm Yes Unknown, Entered By History   guaiFENesin (MUCINEX) 600 MG 12 hr tablet Take 1,200 mg by mouth 2 times daily as needed  prn Yes Unknown, Entered By History   hydrALAZINE (APRESOLINE) 25 MG tablet Take 1 tablet (25 mg) by mouth 3 times daily 5/12/2019 at pm Yes Lucita Downing MD   imiquimod (ALDARA) 5 % cream Apply topically as needed prn Yes Reported, Patient   insulin aspart (NOVOLOG FLEXPEN) 100 UNIT/ML pen Inject 10 Units Subcutaneous 3 times daily (with meals) 5/12/2019 at pm Yes Reported, Patient   insulin glargine (LANTUS SOLOSTAR PEN) 100 UNIT/ML pen Inject 20 Units Subcutaneous 2 times daily 5/12/2019 at pm Yes Dot Ansari MD   ipratropium - albuterol 0.5 mg/2.5 mg/3 mL (DUONEB) 0.5-2.5 (3) MG/3ML neb solution Take 1 vial (3 mLs) by nebulization every 6 hours as needed for shortness of breath / dyspnea or wheezing prn Yes Catrachito Rosado DO   irbesartan (AVAPRO) 300 MG tablet Take 1 tablet (300 mg) by mouth At Bedtime 5/12/2019 at hs Yes Clemencia Pal MD   iron sucrose (VENOFER) 20 MG/ML injection Inject 50 mg into the vein once a week WIth dialysis 5/10/2019 Yes Unknown, Entered By History   Isosorbide Mononitrate  MG TB24 Take 1 tablet (120 mg) by mouth every evening  "5/12/2019 at pm Yes Yuki Hinojosa, DEDRICK GUZMAN   ketoconazole (NIZORAL) 2 % cream Apply topically daily  5/12/2019 Yes Reported, Patient   MAGNESIUM OXIDE PO Take 400 mg by mouth At Bedtime 5/12/2019 at hs Yes Unknown, Entered By History   melatonin 5 MG tablet Take 5 mg by mouth At Bedtime 5/12/2019 at hs Yes Reported, Patient   mirtazapine (REMERON) 15 MG tablet Take 15 mg by mouth At Bedtime 5/12/2019 at hs Yes Reported, Patient   nitroglycerin (NITROSTAT) 0.4 MG SL tablet One tablet under the tongue every 5 minutes if needed for chest pain. May repeat every 5 minutes for a maximum of 3 doses in 15 minutes\" prn Yes Lay Paz MD   Nutritional Supplements (NEPRO PO) 1-3 times daily  Yes Unknown, Entered By History   omega-3 acid ethyl esters (LOVAZA) 1 G capsule Take 2 g by mouth 2 times daily 5/12/2019 Yes Unknown, Entered By History   pantoprazole (PROTONIX) 40 MG EC tablet Take 40 mg by mouth daily 5/12/2019 at am Yes Unknown, Entered By History   polyethylene glycol (MIRALAX/GLYCOLAX) packet Take 1 packet by mouth daily as needed for constipation prn Yes Reported, Patient   ritonavir (NORVIR) 100 MG capsule Take 1 capsule by mouth At Bedtime  5/12/2019 at hs Yes Reported, Patient   sucroferric oxyhydroxide (VELPHORO) 500 MG CHEW chewable tablet Take 500 mg by mouth 3 times daily (with meals) 5/12/2019 at pm Yes Unknown, Entered By History         "

## 2019-05-15 NOTE — PROGRESS NOTES
05/15/19 1033   Quick Adds   Type of Visit Initial Occupational Therapy Evaluation   Living Environment   Lives With spouse   Living Arrangements house  (Split level home )   Home Accessibility stairs to enter home;stairs within home   Transportation Anticipated family or friend will provide  (Pt typically drives )   Living Environment Comment Spouse is available to assist as needed. Daughter (who is also patient's PCA) is available to assist as needed, however works during the day. Recent hospital stay and recent pacer.    Self-Care   Usual Activity Tolerance good   Current Activity Tolerance moderate   Regular Exercise No   Equipment Currently Used at Home cane, straight;shower chair   Activity/Exercise/Self-Care Comment Pt uses a cane outside of the home, no AD within the home.    Functional Level   Ambulation 1-->assistive equipment   Transferring 0-->independent   Toileting 0-->independent   Bathing 1-->assistive equipment   Dressing 0-->independent   Fall history within last six months no   General Information   Onset of Illness/Injury or Date of Surgery - Date 05/13/19   Referring Physician Danny Azar MD   Patient/Family Goals Statement Home    Additional Occupational Profile Info/Pertinent History of Current Problem Per chart: Mr. Raza is a 69-year-old male with a past medical history significant for end-stage renal disease, dialyzing 4 times per week, diabetes, coronary artery disease, chronic obstructive pulmonary disease, HIV, who presents to the Emergency Department with chest pain and shortness of breath. Acute hypoxic respiratory failure. Dual-chamber pacemaker implantation on 05/02/2019   Precautions/Limitations fall precautions   Cognitive Status Examination   Orientation orientation to person, place and time   Level of Consciousness alert   Visual Perception   Visual Perception Wears glasses   Sensory Examination   Sensory Quick Adds   (neuropathy in BLE's and BUE's)   Pain  "Assessment   Patient Currently in Pain Yes, see Vital Sign flowsheet   Transfer Skills   Transfer Transfer Skill: Stand to Sit;Transfer Safety Analysis Bed/Chair;Transfer Safety Analysis Sit/Stand   Transfer Skill: Bed to Chair/Chair to Bed   Level of [place]: Bed to Chair contact guard   Transfer Skill: Sit to Stand   Level of [place]: Sit/Stand contact guard   Toilet Transfer   Toilet Transfer Toilet Transfer Skill;Toilet Transfer Safety Analysis   Transfer Skill: Toilet Transfer   Level of [place]: Toilet contact guard   Lower Body Dressing   Level of [place]: Dress Lower Body contact guard   Instrumental Activities of Daily Living (IADL)   Previous Responsibilities shopping;medication management;finances   General Therapy Interventions   Planned Therapy Interventions ADL retraining;IADL retraining;cognition;transfer training   Clinical Impression   Criteria for Skilled Therapeutic Interventions Met yes, treatment indicated   OT Diagnosis decreased independence for I/ADLs   Influenced by the following impairments decreased independence for I/ADLs   Assessment of Occupational Performance 1-3 Performance Deficits   Identified Performance Deficits decreased independence for I/ADLs (Dressing, bathing, toileting)   Clinical Decision Making (Complexity) Low complexity   Therapy Frequency daily   Predicted Duration of Therapy Intervention (days/wks) 4 days   Anticipated Discharge Disposition Transitional Care Facility   Risks and Benefits of Treatment have been explained. Yes   Patient, Family & other staff in agreement with plan of care Yes   [facility] [employer]-PAC TM \"6 Clicks\"   2016, Trustees of [facility], under license to [employer].  All rights reserved.   6 Clicks Short Forms Daily Activity Inpatient Short Form   [facility] AM-PAC  \"6 Clicks\" Daily Activity Inpatient Short Form   1. Putting on and taking off regular lower body clothing? 3 - A Little   2. Bathing (including " washing, rinsing, drying)? 3 - A Little   3. Toileting, which includes using toilet, bedpan or urinal? 3 - A Little   4. Putting on and taking off regular upper body clothing? 3 - A Little   5. Taking care of personal grooming such as brushing teeth? 3 - A Little   6. Eating meals? 4 - None   Daily Activity Raw Score (Score out of 24.Lower scores equate to lower levels of function) 19   Total Evaluation Time   Total Evaluation Time (Minutes) 8

## 2019-05-15 NOTE — PROGRESS NOTES
DEAN Note:    D/I:  SW received VM from Karen Veronica, patient's [facility], ([phone]) calling to inform of involvement and update on current services patient is receiving. Patient is living at home with family, has PCA services through his daughter and has a lifeline.  Patient is also part of the [facility].    BRIEN Casarez, LGSW

## 2019-05-15 NOTE — PLAN OF CARE
Occupational Therapy Discharge Summary    Reason for therapy discharge:    Discharged to home with home therapy.    Progress towards therapy goal(s). [facility] for goal details.  Goals not met.  Barriers to achieving goals:   discharge from facility.    Therapy recommendation(s):    Recommend TCU. Pt sternly declines this, if home then increased assist for all I/ADLs and home OT

## 2019-05-15 NOTE — DISCHARGE INSTRUCTIONS
Discharge to home with [facility] services. The staff will call to schedule the first visit. Their phone number is [phone].      Resume Outpatient Dialysis at [facility] on Monday, Tuesday, Thursday and Saturdays.

## 2019-05-15 NOTE — PLAN OF CARE
OT: Evaluation and treatment initiated. Pt lives in a split level home with his spouse. Prior pt independent in all ADLs and some IADLs. Pt had a recent hospital stay.    Discharge Planner OT   Patient plan for discharge: Home with A from family and home therapy    Current status: Pt went from sit<>Stand with CGA from the toilet. Pt required total A for pericares. Pt completed 1 hygiene task while standing at the sink with CGA. While seated/standing pt completed LE dressing including don/doff pants with CGA. During session, pt demonstrated increased fatigue with activity.   Barriers to return to prior living situation: Decreased independence and activity tolerance for I/ADLs   Recommendations for discharge: Recommend TCU. Pt sternly declines this, if home then increased assist for all I/ADLs and home OT   Rationale for recommendations: Daily skilled OT to address independence and activity tolerance for I/ADLs, as pt is below baseline.        Entered by: Dmitri Ruiz 05/15/2019 11:25 AM

## 2019-05-15 NOTE — PLAN OF CARE
Discharge Planner PT   Patient plan for discharge: home with A and home therapy  Current status: Pt eval completed. Pt previously I at home with use of SEC for community amb and no AD at home.    Pt appears to be near baseline, SBA for transfers and amb with ' able to complete head movement without gait disturbance, completed 10 steps with SEC and 1 rail and SBA. Pt has A from family member PCA and family at home.  Plan set for home with home therapies.  Agree with plan. Will defer treatment to home PT  Barriers to return to prior living situation: none  Recommendations for discharge: home with home PT  Rationale for recommendations: To progress functional mob I and safety to PLOF       Entered by: HARSH ORELLANA 05/15/2019 2:16 PM     Physical Therapy Discharge Summary    Reason for therapy discharge:    Discharged to home with home therapy.    Progress towards therapy goal(s). See goals on Care Plan in [facility] electronic health record for goal details.  eval only    Therapy recommendation(s):    Continued therapy is recommended.  Rationale/Recommendations:  see above.

## 2019-05-15 NOTE — PLAN OF CARE
A & O x 4, VSS, no c/o pain, but has BLE numbness in feet, baseline. Fistula w/bruit heard and thrill felt. Tele: 100% paced. Oliguria. Possible discharge in 1-2 days.

## 2019-05-15 NOTE — CONSULTS
Care Transition Initial Assessment - RN    Per chart review, OT recommending TCU   Met with: Patient to discuss discharge planning.  Informed pt that OT was recommending TCU.  Per pt, lives with spouse and has PCA services and home care RN & PT with [facility] and he goes to [facility]patient [facility] dialysis at [place].  Pt states that he has his spouse or other family members that drive him to his appointment.  Patient declining TCU and wanting to discharge home with resumption of [facility].  Referral sent to [facility] paged for home care orders.  Offered to schedule pt's PCP appointment and pt would like this done.  PCP follow-up scheduled.  Appointment date/time placed on AVS.  DATA   Active Problems:    Hypertensive emergency       Cognitive Status: awake.        Contact information and PCP information verified: Yes  Lives With: spouse   Living Arrangements: house(Split level home )                 Insurance concerns: No Insurance issues identified  ASSESSMENT  Patient currently receives the following services:  PCA services, [facility] RN & PT.        Identified issues/concerns regarding health management: none identified     PLAN  Financial costs for the patient include N/A .  Patient given options and choices for discharge N/A .  Patient/family is agreeable to the plan?  Yes: home with resumption of home care  Patient anticipates discharging to home .        Patient anticipates needs for home equipment: No  Plan/Disposition: Home   [facility]oint[facility]      Care  (CTS) will continue to follow as needed.

## 2019-05-15 NOTE — PROGRESS NOTES
05/15/19 1300   Quick Adds   Type of Visit Initial PT Evaluation   Living Environment   Lives With spouse   Living Arrangements house   Home Accessibility stairs to enter home   Number of Stairs, Main Entrance 1   Stair Railings, Main Entrance none   Number of Stairs, Within Home, Primary 4   Stair Railings, Within Home, Primary railing on left side (ascending)   Transportation Anticipated family or friend will provide   Living Environment Comment Spouse is available to assist as needed. Daughter (who is also patient's PCA) is available to assist as needed, however works during the day. Recent hospital stay and recent pacer.    Self-Care   Usual Activity Tolerance good   Current Activity Tolerance moderate   Regular Exercise No   Equipment Currently Used at Home cane, straight   Activity/Exercise/Self-Care Comment Pt uses a cane outside of the home, no AD within the home.    Functional Level Prior   Ambulation 1-->assistive equipment   Transferring 0-->independent   Toileting 0-->independent   Bathing 1-->assistive equipment   Communication 0-->understands/communicates without difficulty   Swallowing 0-->swallows foods/liquids without difficulty   Cognition 0 - no cognition issues reported   Fall history within last six months no   General Information   Onset of Illness/Injury or Date of Surgery - Date 05/14/19   Referring Physician Danny Azar   Patient/Family Goals Statement Pt plans on dc to home   Pertinent History of Current Problem (include personal factors and/or comorbidities that impact the POC) pt admitted with SOB, acute respiratory failure, on dialysis   Precautions/Limitations fall precautions   Weight-Bearing Status - LUE full weight-bearing   Weight-Bearing Status - RUE full weight-bearing   Weight-Bearing Status - LLE full weight-bearing   Weight-Bearing Status - RLE full weight-bearing   Cognitive Status Examination   Orientation orientation to person, place and time   Level of Consciousness  "alert   Follows Commands and Answers Questions 100% of the time   Pain Assessment   Patient Currently in Pain No   Range of Motion (ROM)   ROM Quick Adds No deficits were identified   Strength   Strength Comments MMT strong,mm endurance decreased   Transfer Skills   Transfer Comments sit to stand with SBA and SEC   Gait   Gait Comments amb with SBA with ', Slow mark, mild instaility initially, improved with distance, able to complete 10 steps with SEC and SBA and 1 rail   Balance   Balance Comments mild impaired dynamic standing balance using U UE support to maintain, able to complete head movement without gait disturbance during amb, able to march in place wihtout UE support   Sensory Examination   Sensory Perception Comments Pt reports B feet numbness, baseline   Coordination   Coordination no deficits were identified   Muscle Tone   Muscle Tone no deficits were identified   General Therapy Interventions   Intervention Comments deferred to home PT as pt discharging today   Clinical Impression   Criteria for Skilled Therapeutic Intervention yes, treatment indicated   PT Diagnosis difficulty walkinn   Influenced by the following impairments decreased mm endurance and act carmen   Functional limitations due to impairments impaired functional mob I and safety   Clinical Presentation Stable/Uncomplicated   Clinical Presentation Rationale clincial judgement   Clinical Decision Making (Complexity) Low complexity   Predicted Duration of Therapy Intervention (days/wks) 1 day, eval only   Anticipated Discharge Disposition Home with Home Therapy;Home with Assist   Risk & Benefits of therapy have been explained Yes   Patient, Family & other staff in agreement with plan of care Yes   [facility] AM-PAC  \"6 Clicks\" V.2 Basic Mobility Inpatient Short Form   1. Turning from your back to your side while in a flat bed without using bedrails? 4 - None   2. Moving from lying on your back to sitting on the side of a flat " bed without using bedrails? 4 - None   3. Moving to and from a bed to a chair (including a wheelchair)? 4 - None   4. Standing up from a chair using your arms (e.g., wheelchair, or bedside chair)? 4 - None   5. To walk in hospital room? 4 - None   6. Climbing 3-5 steps with a railing? 4 - None   Basic Mobility Raw Score (Score out of 24.Lower scores equate to lower levels of function) 24   Total Evaluation Time   Total Evaluation Time (Minutes) 30

## 2019-05-15 NOTE — PLAN OF CARE
Patient alert and oriented, very pleasant. VSS on room air, LS clear/diminished, O2 saturation WNL on room air. Tele shows NSR. Denies pain, ambulating to bathroom with assist of 1. BM this shift, oliguria due to hemodialysis. BG corrected with sliding scale insulin, HS BG WNL, tolerating diet. Discharge pending, nursing will continue to monitor.

## 2019-05-15 NOTE — DISCHARGE SUMMARY
Discharge Summary    Donald Raza MRN# [id number]   YOB: 1950 Age: 69 year old     Date of Admission:  5/13/2019  Date of Discharge:  5/15/2019  Admitting Physician:  Bj Ocampo DO  Discharge Physician:  Danny Azar MD  Discharging Service:  Hospitalist     Primary Provider: Sukh Owen          Admission Diagnoses:   Hypertensive crisis [I16.9]  Acute systolic congestive heart failure (H) [I50.21]  Shortness of breath [R06.02]          Discharge Diagnosis:   Patient Active Problem List   Diagnosis     Type 2 diabetes mellitus (H)     Human immunodeficiency virus (HIV) disease (HCC)     Allergic rhinitis     Impotence of organic origin     Huang disease     Renal insufficiency     Hypertension     Mixed hyperlipidemia     Organ transplant candidate     Acute chest pain     Coronary artery disease     Unstable angina (H)     ESRD (end stage renal disease) on dialysis (H)     Pulmonary hypertension (H)     Dilated aortic root (H)     Bradycardia     Anemia     Bilateral pneumonia     Diarrhea     AIDS (acquired immune deficiency syndrome) (H)     Anemia due to chronic kidney disease     Dialysis AV fistula malfunction (H)     Unstable angina pectoris (H)     Bronchitis     Pneumonia     Acute respiratory failure with hypoxia (H)     Acute pulmonary edema (H)     Respiratory failure (H)     Generalized weakness     TIA (transient ischemic attack)     Cerebral hypoperfusion, transient and thought primarily to low volume/BP assoc with dialysis     Expressive aphasia     Generalized muscle weakness     Sepsis (H)     COPD (chronic obstructive pulmonary disease) (H)     COPD exacerbation (H)     Dialysis patient (H)     Angina at rest (H)     Chest pain     Cardiac pacemaker in situ     Hypertensive emergency  Acute non-cardiogenic pulmonary edema              Condition on Discharge:       Discharge vitals: Blood pressure 143/57, pulse 62, temperature 98.1  F (36.7  C), temperature  source Oral, resp. rate 16, weight 88.5 kg (195 lb 1.7 oz), SpO2 (!) 89 %.         Gen: Well nourished, well developed, alert and oriented x 3, no acute distressed  HEENT: Atraumatic, normocephalic  Lungs: Clear to ausculation without wheezes, rhonchi, or rales  Heart: Regular rate and rhythm, no gallops or rubs, no murmurs  GI: Bowel sound normal, no hepatosplenomegaly or masses  Lymph: No lymphadenopathy or edema  Skin: No rashes          Procedures / Labs / Imaging:   Most Recent 3 CBC's:  Recent Labs   Lab Test 05/15/19  0738 05/14/19  0528 05/13/19  2015 05/13/19  0600   WBC 5.2 5.7  --  8.4   HGB 8.4* 8.5* 8.4* 9.0*   MCV 95 95  --  94   * 131*  --  157      Most Recent 3 BMP's:  Recent Labs   Lab Test 05/15/19  0738 05/14/19  0528 05/13/19  0600   * 133 129*   POTASSIUM 4.4 5.0 5.3   CHLORIDE 96 98 94   CO2 27 26 22   BUN 64* 80* 123*   CR 6.23* 7.41* 9.60*   ANIONGAP 9 9 13   PRABHA 10.0 9.8 9.6   * 182* 280*     Most Recent 3 Troponin's:  Recent Labs   Lab Test 05/13/19  1448 05/13/19  1200 05/13/19  0600  03/08/18  0542  12/25/17  0856  04/15/17  0820   TROPI 0.585* 0.485* 0.043   < > 0.043   < >  --    < >  --    TROPONIN  --   --   --   --  0.02  --  0.02  --  0.02    < > = values in this interval not displayed.     Most Recent 3 INR's:  Recent Labs   Lab Test 05/13/19  0600 05/02/19  0427 12/05/18  2214   INR 1.10 1.06 1.03     Most Recent 2 LFT's:  Recent Labs   Lab Test 05/02/19  0427 02/11/19 2001   AST 11 23   ALT 17 16   ALKPHOS 174* 125   BILITOTAL 0.5 0.5     Most Recent Cholesterol Panel:  Recent Labs   Lab Test 09/02/18  0629   CHOL 127   LDL Cannot estimate LDL when triglyceride exceeds 400 mg/dL  48   HDL 26*   TRIG 447*     Most Recent 6 Bacteria Isolates From Any Culture (See EPIC Reports for Culture Details):  Recent Labs   Lab Test 02/11/19 2008 02/11/19 2001 01/03/19  1359 01/03/19  1242 12/06/18  0920 12/05/18  2213   CULT No growth No growth No growth No growth  10,000 to 50,000 colonies/mL  Streptococcus dysgalactiae serogroup C/G  *  10,000 to 50,000 colonies/mL  mixed urogenital subhash   No growth     Most Recent TSH, T4 and HgbA1c:   Recent Labs   Lab Test 05/13/19  0600 12/06/18  0933   TSH 4.48*  --    T4 0.70*  --    A1C  --  4.7     Results for orders placed or performed during the hospital encounter of 05/13/19   XR Chest Port 1 View    Narrative    XR CHEST PORTABLE 1 VIEW   5/13/2019 5:48 AM     HISTORY: Shortness of breath and chest pain.    COMPARISON: 5/3/2019.    FINDINGS: Upright portable chest. Right subclavian cardiac device in  place. No pneumothorax. The heart is enlarged. There is interstitial  pulmonary edema. The thoracic aorta is calcified. No pneumothorax.      Impression    IMPRESSION: Pulmonary edema.    LIGIA RIVERO MD             Medications Prior to Admission:     Medications Prior to Admission   Medication Sig Dispense Refill Last Dose     abacavir (ZIAGEN) 300 MG tablet Take 600 mg by mouth every evening    5/12/2019 at pm     Acetaminophen (TYLENOL PO) Take 325 mg by mouth every 6 hours as needed for mild pain or fever    prn     albuterol (PROAIR HFA/PROVENTIL HFA/VENTOLIN HFA) 108 (90 BASE) MCG/ACT Inhaler Inhale 2 puffs into the lungs every 6 hours as needed for shortness of breath / dyspnea or wheezing 1 Inhaler 1 prn     aspirin EC 81 MG EC tablet Take 1 tablet (81 mg) by mouth daily 30 tablet 0 5/12/2019     atorvastatin (LIPITOR) 40 MG tablet Take 40 mg by mouth At Bedtime   5/12/2019 at hs     B COMPLEX-C-FOLIC ACID PO Take 1 tablet by mouth At Bedtime    5/12/2019 at hs     chlorhexidine (CHLORHEXIDINE) 0.12 % solution Rinse and gargle 15 ml by mouth twice a day as directed.   5/12/2019 at pm     CLONAZEPAM PO Take 0.5 mg by mouth nightly as needed for anxiety (restless legs)   prn     CLOPIDOGREL BISULFATE PO Take 75 mg by mouth every evening    5/12/2019 at am     dapsone 100 MG tablet Take 100 mg by mouth At Bedtime     5/12/2019 at pm     Darunavir Ethanolate (PREZISTA PO) Take 800 mg by mouth At Bedtime.   5/12/2019 at hs     dolutegravir (TIVICAY) 50 MG tablet Take 50 mg by mouth At Bedtime   5/12/2019 at hs     DOXERCALCIFEROL IV Inject 6 mcg into the vein three times a week (with dialysis)   5/10/2019     epoetin brittni (EPOGEN,PROCRIT) 52047 UNIT/ML injection Inject 11,000 Units Subcutaneous three times a week WITH DIALYSIS   5/10/2019     gabapentin (NEURONTIN) 300 MG capsule Take 300 mg by mouth as needed May take after HD   prn     gabapentin (NEURONTIN) 300 MG capsule Take 300 mg by mouth 2 times daily   5/12/2019 at pm     guaiFENesin (MUCINEX) 600 MG 12 hr tablet Take 1,200 mg by mouth 2 times daily as needed    prn     imiquimod (ALDARA) 5 % cream Apply topically as needed   prn     insulin aspart (NOVOLOG FLEXPEN) 100 UNIT/ML pen Inject 10 Units Subcutaneous 3 times daily (with meals)   5/12/2019 at pm     insulin glargine (LANTUS SOLOSTAR PEN) 100 UNIT/ML pen Inject 20 Units Subcutaneous 2 times daily 12 mL 0 5/12/2019 at pm     ipratropium - albuterol 0.5 mg/2.5 mg/3 mL (DUONEB) 0.5-2.5 (3) MG/3ML neb solution Take 1 vial (3 mLs) by nebulization every 6 hours as needed for shortness of breath / dyspnea or wheezing 90 vial 1 prn     irbesartan (AVAPRO) 300 MG tablet Take 1 tablet (300 mg) by mouth At Bedtime 30 tablet 0 5/12/2019 at hs     iron sucrose (VENOFER) 20 MG/ML injection Inject 50 mg into the vein once a week WIth dialysis   5/10/2019     Isosorbide Mononitrate  MG TB24 Take 1 tablet (120 mg) by mouth every evening 30 tablet 11 5/12/2019 at pm     ketoconazole (NIZORAL) 2 % cream Apply topically daily    5/12/2019     MAGNESIUM OXIDE PO Take 400 mg by mouth At Bedtime   5/12/2019 at hs     melatonin 5 MG tablet Take 5 mg by mouth At Bedtime   5/12/2019 at hs     mirtazapine (REMERON) 15 MG tablet Take 15 mg by mouth At Bedtime   5/12/2019 at hs     nitroglycerin (NITROSTAT) 0.4 MG SL tablet One tablet  "under the tongue every 5 minutes if needed for chest pain. May repeat every 5 minutes for a maximum of 3 doses in 15 minutes\" 25 tablet 3 prn     Nutritional Supplements (NEPRO PO) 1-3 times daily   1/28/2019 at Unknown time     omega-3 acid ethyl esters (LOVAZA) 1 G capsule Take 2 g by mouth 2 times daily   5/12/2019     pantoprazole (PROTONIX) 40 MG EC tablet Take 40 mg by mouth daily   5/12/2019 at am     polyethylene glycol (MIRALAX/GLYCOLAX) packet Take 1 packet by mouth daily as needed for constipation   prn     ritonavir (NORVIR) 100 MG capsule Take 1 capsule by mouth At Bedtime    5/12/2019 at hs     sucroferric oxyhydroxide (VELPHORO) 500 MG CHEW chewable tablet Take 500 mg by mouth 3 times daily (with meals)   5/12/2019 at pm     [DISCONTINUED] carvedilol (COREG) 12.5 MG tablet Take 1 tablet (12.5 mg) by mouth 2 times daily (with meals) 60 tablet 0 5/12/2019 at pm     [DISCONTINUED] hydrALAZINE (APRESOLINE) 25 MG tablet Take 1 tablet (25 mg) by mouth 3 times daily 90 tablet 0 5/12/2019 at pm             Discharge Medications:     Current Discharge Medication List      START taking these medications    Details   amLODIPine (NORVASC) 5 MG tablet Take 1 tablet (5 mg) by mouth 2 times daily  Qty: 60 tablet, Refills: 0    Associated Diagnoses: Hypertensive emergency         CONTINUE these medications which have CHANGED    Details   carvedilol (COREG) 25 MG tablet Take 1 tablet (25 mg) by mouth 2 times daily (with meals)  Qty: 60 tablet, Refills: 0    Associated Diagnoses: Hypertensive emergency         CONTINUE these medications which have NOT CHANGED    Details   abacavir (ZIAGEN) 300 MG tablet Take 600 mg by mouth every evening       Acetaminophen (TYLENOL PO) Take 325 mg by mouth every 6 hours as needed for mild pain or fever       albuterol (PROAIR HFA/PROVENTIL HFA/VENTOLIN HFA) 108 (90 BASE) MCG/ACT Inhaler Inhale 2 puffs into the lungs every 6 hours as needed for shortness of breath / dyspnea or " wheezing  Qty: 1 Inhaler, Refills: 1    Associated Diagnoses: Cough      aspirin EC 81 MG EC tablet Take 1 tablet (81 mg) by mouth daily  Qty: 30 tablet, Refills: 0    Associated Diagnoses: Coronary artery disease, angina presence unspecified, unspecified vessel or lesion type, unspecified whether native or transplanted heart      atorvastatin (LIPITOR) 40 MG tablet Take 40 mg by mouth At Bedtime      B COMPLEX-C-FOLIC ACID PO Take 1 tablet by mouth At Bedtime       chlorhexidine (CHLORHEXIDINE) 0.12 % solution Rinse and gargle 15 ml by mouth twice a day as directed.      CLONAZEPAM PO Take 0.5 mg by mouth nightly as needed for anxiety (restless legs)      CLOPIDOGREL BISULFATE PO Take 75 mg by mouth every evening       dapsone 100 MG tablet Take 100 mg by mouth At Bedtime       Darunavir Ethanolate (PREZISTA PO) Take 800 mg by mouth At Bedtime.      dolutegravir (TIVICAY) 50 MG tablet Take 50 mg by mouth At Bedtime      DOXERCALCIFEROL IV Inject 6 mcg into the vein three times a week (with dialysis)      epoetin brittni (EPOGEN,PROCRIT) 79125 UNIT/ML injection Inject 11,000 Units Subcutaneous three times a week WITH DIALYSIS      !! gabapentin (NEURONTIN) 300 MG capsule Take 300 mg by mouth as needed May take after HD      !! gabapentin (NEURONTIN) 300 MG capsule Take 300 mg by mouth 2 times daily      guaiFENesin (MUCINEX) 600 MG 12 hr tablet Take 1,200 mg by mouth 2 times daily as needed       imiquimod (ALDARA) 5 % cream Apply topically as needed      insulin aspart (NOVOLOG FLEXPEN) 100 UNIT/ML pen Inject 10 Units Subcutaneous 3 times daily (with meals)      insulin glargine (LANTUS SOLOSTAR PEN) 100 UNIT/ML pen Inject 20 Units Subcutaneous 2 times daily  Qty: 12 mL, Refills: 0      ipratropium - albuterol 0.5 mg/2.5 mg/3 mL (DUONEB) 0.5-2.5 (3) MG/3ML neb solution Take 1 vial (3 mLs) by nebulization every 6 hours as needed for shortness of breath / dyspnea or wheezing  Qty: 90 vial, Refills: 1    Associated  "Diagnoses: Acute respiratory failure with hypoxia (H)      irbesartan (AVAPRO) 300 MG tablet Take 1 tablet (300 mg) by mouth At Bedtime  Qty: 30 tablet, Refills: 0    Associated Diagnoses: Hypertension secondary to other renal disorders      iron sucrose (VENOFER) 20 MG/ML injection Inject 50 mg into the vein once a week WIth dialysis      Isosorbide Mononitrate  MG TB24 Take 1 tablet (120 mg) by mouth every evening  Qty: 30 tablet, Refills: 11    Associated Diagnoses: Coronary artery disease involving native heart, angina presence unspecified, unspecified vessel or lesion type; Hypertension secondary to other renal disorders      ketoconazole (NIZORAL) 2 % cream Apply topically daily       MAGNESIUM OXIDE PO Take 400 mg by mouth At Bedtime      melatonin 5 MG tablet Take 5 mg by mouth At Bedtime      mirtazapine (REMERON) 15 MG tablet Take 15 mg by mouth At Bedtime      nitroglycerin (NITROSTAT) 0.4 MG SL tablet One tablet under the tongue every 5 minutes if needed for chest pain. May repeat every 5 minutes for a maximum of 3 doses in 15 minutes\"  Qty: 25 tablet, Refills: 3    Associated Diagnoses: Coronary artery disease due to lipid rich plaque; Status post coronary angioplasty      Nutritional Supplements (NEPRO PO) 1-3 times daily      omega-3 acid ethyl esters (LOVAZA) 1 G capsule Take 2 g by mouth 2 times daily      pantoprazole (PROTONIX) 40 MG EC tablet Take 40 mg by mouth daily      polyethylene glycol (MIRALAX/GLYCOLAX) packet Take 1 packet by mouth daily as needed for constipation      ritonavir (NORVIR) 100 MG capsule Take 1 capsule by mouth At Bedtime       sucroferric oxyhydroxide (VELPHORO) 500 MG CHEW chewable tablet Take 500 mg by mouth 3 times daily (with meals)       !! - Potential duplicate medications found. Please discuss with provider.      STOP taking these medications       hydrALAZINE (APRESOLINE) 25 MG tablet Comments:   Reason for Stopping:                     Brief History of " Illness:   Donald Raza is a 69 year old male who was admitted for hypertensive emergency.          Hospital Course:   The patient presented emergency room with a blood pressure of 210/140 as well as chest pain and shortness of breath.  The patient was placed on BiPAP and nitroglycerin drip.  He was seen by cardiology due to concern for acute coronary syndrome.  Cardiology did not feel that urgent cath is indicated at this time.  Patient was seen by nephrology and underwent urgent dialysis.  Infectious disease was consulted and recommended the patient be continued on his current HIV regimen.  Following dialysis, patient's shortness of breath and chest pain resolved.  The patient's hydralazine was changed to amlodipine.  His carvedilol was increased to 25 mg twice daily.  He was observed overnight and discharged to home the following morning.  TCU was recommended, but the patient declined.  He was discharged home with home nurse, physical therapy, and Occupational Therapy.    Greater than 35 minutes were spent in discharge planning.             Pending Results:   Unresulted Labs Ordered in the Past 30 Days of this Admission     Date and Time Order Name Status Description    5/13/2019 0600 T4 free In process

## 2019-05-15 NOTE — PROGRESS NOTES
[facility]  Infectious Disease Progress Note          Assessment and Plan:   IMPRESSION:   1.  A 69-year-old male, extremely well known to me from multiple prior admissions, now admitted with worsening respiratory status, no clear infection, probably primarily fluid related.   2.  Chronic HIV infection, followed in the [facility] system on long-term highly active regimen that has been effective at least at controlling viremia, has had T-cell count generally in the low 100s.   3.  Diabetes mellitus.   4.  End-stage renal disease on dialysis.   5.  Chronic obstructive lung disease including multiple prior respiratory failure episodes.   6.  History of recurrent pneumonia, not particularly common recently.   7.  SULFA ALLERGY.      RECOMMENDATIONS:   1.  Continue same HIV regimen including the dapsone for Pneumocystis prevention that has been on historically. On a fairly unusual combo but has worked well incl recent neg viremia  2.  Unclear how recently seen in the [facility] system, but still followed there regularly.   3.  It does not appear the current episode is infection related, but follow for this, at times will develop pneumonia in the hospital. At obvious risk, improved                Interval History:   Stable down to RA, back on historical HIV regimen off O2 feels OK              Medications:       abacavir  600 mg Oral QPM     amLODIPine  5 mg Oral BID     aspirin  81 mg Oral Daily     carvedilol  12.5 mg Oral BID w/meals     clopidogrel  75 mg Oral QPM     dapsone  100 mg Oral At Bedtime     darunavir  800 mg Oral At Bedtime     dolutegravir  50 mg Oral At Bedtime     doxercalciferol  2.5 mcg Intravenous Once in dialysis     epoetin brittni (EPOGEN,PROCRIT) inj ESRD  10,000 Units Intravenous Once per day on Mon Wed Fri     insulin aspart  1-7 Units Subcutaneous TID AC     insulin aspart  1-5 Units Subcutaneous At Bedtime     isosorbide mononitrate  120 mg Oral QPM     ritonavir   100 mg Oral At Bedtime     sodium chloride (PF)  3 mL Intracatheter Q8H                  Physical Exam:   Blood pressure 163/71, pulse 68, temperature 98.1  F (36.7  C), temperature source Oral, resp. rate 16, weight 88.5 kg (195 lb 1.7 oz), SpO2 95 %.  Wt Readings from Last 2 Encounters:   05/14/19 88.5 kg (195 lb 1.7 oz)   05/06/19 94.3 kg (207 lb 14.4 oz)     Vital Signs with Ranges  Temp:  [98.1  F (36.7  C)-98.9  F (37.2  C)] 98.1  F (36.7  C)  Heart Rate:  [63-80] 73  Resp:  [14-16] 16  BP: ()/(49-79) 163/71  SpO2:  [92 %-95 %] 95 %    Constitutional: Awake, alert, cooperative, no apparent distress   Lungs: Clear to auscultation bilaterally, no crackles or wheezing   Cardiovascular: Regular rate and rhythm, normal S1 and S2, and no murmur noted   Abdomen: Normal bowel sounds, soft, non-distended, non-tender   Skin: No rashes, no cyanosis, no edema   Other:           Data:   All microbiology laboratory data reviewed.  Recent Labs   Lab Test 05/15/19  0738 05/14/19 0528 05/13/19 2015 05/13/19  0600   WBC 5.2 5.7  --  8.4   HGB 8.4* 8.5* 8.4* 9.0*   HCT 26.6* 26.8*  --  28.2*   MCV 95 95  --  94   * 131*  --  157     Recent Labs   Lab Test 05/15/19  0738 05/14/19  0528 05/13/19  0600   CR 6.23* 7.41* 9.60*     Recent Labs   Lab Test 09/07/18  0547   SED 66*     Recent Labs   Lab Test 02/11/19 2008 02/11/19 2001 01/03/19  1359 01/03/19  1242 12/06/18  0920 12/05/18  2213 12/05/18  2140 09/03/18  1824 09/03/18  1820   CULT No growth No growth No growth No growth 10,000 to 50,000 colonies/mL  Streptococcus dysgalactiae serogroup C/G  *  10,000 to 50,000 colonies/mL  mixed urogenital subhash   No growth No growth No growth No growth

## 2019-05-15 NOTE — PLAN OF CARE
Patient discharged at 1555 PM to home.  IV was discontinued. Belongings returned to patient.  Discharge instructions and medications reviewed with patient.  Patient verbalized understanding and all questions were answered.  Prescriptions given to patient.  At time of discharge, patient condition was stable and left the unit via wheelchair escorted by .

## 2019-05-17 NOTE — TELEPHONE ENCOUNTER
Called pt - as instructed called pt's daughter's phone.  Left message - pt to follow up with device interrogation 06/19/2019 and then office visit with RENETTA MStroque NP 08/08/2019  Call back if need arise before that.

## 2019-07-26 NOTE — ED NOTES
[facility]  ED Nurse Handoff Report    Donald Raza is a 69 year old male   ED Chief complaint: Chest Pain  . ED Diagnosis:   Final diagnoses:   Gastrointestinal hemorrhage with melena     Allergies:   Allergies   Allergen Reactions     Calcium Acetate Other (See Comments)     Other reaction(s): Other, see comments  Pain in chest and back  Pain in chest area (sensitive skin)      Contrast Dye Other (See Comments)     Contrast nephropathy     Diagnostic X-Ray Materials Other (See Comments)     PN: renal failure     Lisinopril      Sulfa Drugs        Code Status: Full Code  Activity level - Baseline/Home:  Assist X 1. Activity Level - Current:   Assist X 2. Lift room needed: No. Bariatric: No   Needed: No   Isolation: No. Infection: Hx HIV.     Vital Signs:   Vitals:    07/26/19 0130 07/26/19 0239 07/26/19 0245 07/26/19 0300   BP: 177/81  171/81 (!) 175/96   Pulse: 74  73 72   Resp:   20 22   Temp:  98.4  F (36.9  C)     TempSrc:  Oral     SpO2: 97%  98% 98%   Weight:           Cardiac Rhythm:  ,   Cardiac  Cardiac Rhythm: Normal sinus rhythm  Pain level:    Patient confused: No. Patient Falls Risk: Yes.   Elimination Status: Has not voided in ER   Patient Report - Initial Complaint: Chest pain, SOB. Focused Assessment: Pt A&Ox4 ABCD's intact Pt is a dialysis pt who had dialysis today. Pt in for evaluation of chest pain. Pt has a low hemoglobin and occult + stool. RBC transfusion started and pt reported increased SOB. Transfusion stopped and MD in to evaluate. Pt resting at this time      Tests Performed: labs, chest x-ray . Abnormal Results:   Abnormal Labs Reviewed   CBC WITH PLATELETS DIFFERENTIAL - Abnormal; Notable for the following components:       Result Value    RBC Count 2.13 (*)     Hemoglobin 6.6 (*)     Hematocrit 21.5 (*)      (*)     MCHC 30.7 (*)     RDW 15.3 (*)     Platelet Count 104 (*)     Absolute Lymphocytes 0.7 (*)     All other components within normal limits    COMPREHENSIVE METABOLIC PANEL - Abnormal; Notable for the following components:    Sodium 129 (*)     Glucose 204 (*)     Urea Nitrogen 83 (*)     Creatinine 9.07 (*)     GFR Estimate 5 (*)     GFR Estimate If Black 6 (*)     All other components within normal limits   OCCULT BLOOD STOOL - Abnormal; Notable for the following components:    Occult Blood Positive (*)     All other components within normal limits   ISTAT  GASES LACTATE FEMI POCT - Abnormal; Notable for the following components:    PCO2 Venous 38 (*)     All other components within normal limits     XR Chest Port 1 View   Preliminary Result   IMPRESSION: Shallow inspiration. Mild cardiomegaly. Slight perihilar   interstitial prominence again noted and may be due to edema. No   pulmonary consolidation or significant change. Right chest wall   cardiac device.      XR Chest 2 Views   Final Result   IMPRESSION: Mild cardiomegaly. Mild hazy opacities probably due to   edema, though less prominent than on the prior study. No consolidation   or other acute findings. Right chest wall cardiac device in place.      HORACE CARLIN MD        Treatments provided: blood transfusion Family Comments: brother at bedside OBS brochure/video discussed/provided to patient:  N/A  ED Medications:   Medications   ipratropium - albuterol 0.5 mg/2.5 mg/3 mL (DUONEB) neb solution 3 mL (has no administration in time range)     Drips infusing:  No  For the majority of the shift, the patient's behavior Green. Interventions performed were n/a.     Severe Sepsis OR Septic Shock Diagnosis Present: No  RECEIVING UNIT ED HANDOFF REVIEW    Above ED Nurse Handoff Report was reviewed: Yes  Reviewed by: PAUL LYNNE on July 26, 2019 at 4:37 AM       ED Nurse Name/Phone Number: Chon Mascorro,   3:51 AM

## 2019-07-26 NOTE — PLAN OF CARE
Patient weak this am and reporting that he has new pain which he refers to as bone pain. He also reports a feeling like his arms are being shocked. B/P elevated this am and Dr Johnson saw patient at start of shift. Aware of labs Hgb 6.3 and Morning trop of 0.172 and later 0.251. Patient in dialysis for extra run and 2 units of PRBC. Tele SR with AVB. Lungs clear. No edema.

## 2019-07-26 NOTE — PLAN OF CARE
"New admission. Pt Alert and orientedx4 up with assist of two and a gaitbelt as pt reports dizziness when getting up. Hmg 6.6 and 6.3. Attempt of 1 unit of RBC in ED was stopped as pt become SOB. On the floor pt denies feeling SOB denies any chest pain. VSS. PT complains of \"shock traveling down my arms and legs\" pt has neuropathy however pt states \"this is different\". Md notified this AM of critical hmg at 6.3 Troponin at 0.172 and shock feeling. Pt hmg A1c was not able to be obtained today as pt hmg is low. Morning shift RN notified of abnormal labs and the failed A1C. Pt is NPO for gastro consult. Pt reports having diarrhea for the past few days. ID consult for HIV positive. Pt HIV medications not given this AM as Pt usually takes them at bedtime and med rec was not complete when pt arrived to the unit. PT is aware and is okay with getting them tonight. PT has a left arm fistula for dialysis, last round was Thursday. Will continue to monitor.  "

## 2019-07-26 NOTE — ED PROVIDER NOTES
"  History     Chief Complaint:  Chest pain    The history is provided by the patient and the EMS personnel.      Donald Raza is a 69 year old male with a lengthy medical history that includes CKD, CAD, hyperlipidemia, hypertension, type II diabetes, and HIV who presents to the emergency department for evaluation of chest pain. EMS reports that the patient felt chest discomfort this morning and had a sensation like something was lodged in his throat. He went to a doctor appointment and after arriving back home he vomited multiple times. When EMS arrived his blood pressure was 210/108 ad he rated his pain as an 8/10. They administered 324 mg of Aspirin and 2 doses of nitro. The patient's pain inproved to 3/10 and his blood pressure was 180/100.    In the emergency department, the patient reports that he had intermittent chest pain this morning. He states that his pain is intermittent lasting several hours at a time in his upper central chest and throat. He describes a sensation that \"something is stuck in his throat.\" The patient states that he has had intermittent chest pain over the last few days. He also mentions having shortness of breath which began today and prompted his call to EMS. He also states vomiting at 2100 or 2200 yesterday, chills, and diarrhea. He states that his diarrhea has improved. He denies any abdominal pain, bloody stool, dizziness, nausea or recent antibiotic use. He mentions having a pacemaker inserted 2 months ago after a heart attack that he does not remember well.    Allergies:  Calcium Acetate  Contrast de  Diagnostic X-ray materials  Lisinopril  Sulfa Drugs    Medications:    Ziagen  Albuterol  Norvasc  Aspirin 81 mg  Lipitor  Coreg  Chlorhexidine  Clonazepam  Clopidogrel bisulfate  Dapsone  Prezista  Tivicay  Doxercalciferol  Epogen  Neurontin  Mucinex  Insulin  Duoneb  Avapro  Venofer  Isosorbide mononitrate  Magnesium " oxide  Melatonin  Remeron  Nitrostat  Nepro  Lovaza  Protonix  Miralax  Norvir  Velphoro    Past Medical History:    Allergic rhinitis  Anemia  CKD  Cataract  Colon cancer  COPD  Depression  Dilated aortic root  Diverticulitis  ESRD  Gout  HIV  Squamous cell carcinoma  Hypertension  Impotence  Increased PSA velocity  Hyperlipidemia  TIA  Diabetes type II  Pacemaker   Sepsis  Aphasia  AIDS  CAD  Huang disease    Past Surgical History:    Angiogram x2  Appendectomy  Cholecystectomy  Colon surgery  Colostomy  EP Pacemaker  Left heart catheterization  Heart angioplasty  Dialysis fistulogram  Coronary stent x2    Family History:    Heart disease  Kidney disease  Hypertension    Social History:  The patient was accompanied to the ED by EMS.  Smoking Status: Former  Smokeless Tobacco: Never  Alcohol Use: No  Drug Use: No  Marital Status:        Review of Systems   Constitutional: Positive for chills.   Respiratory: Positive for shortness of breath.    Cardiovascular: Positive for chest pain.   Gastrointestinal: Positive for diarrhea and vomiting. Negative for abdominal pain, blood in stool and nausea.   Neurological: Negative for dizziness.   All other systems reviewed and are negative.    Physical Exam   Vitals:  Patient Vitals for the past 24 hrs:   BP Temp Temp src Pulse Heart Rate Resp SpO2 Weight   07/26/19 0130 177/81 -- -- 74 -- -- 97 % --   07/26/19 0110 187/84 -- -- 78 77 -- 97 % --   07/26/19 0017 (!) 191/95 98.4  F (36.9  C) Oral -- -- -- -- 91 kg (200 lb 9.9 oz)   07/26/19 0013 -- -- -- -- 84 20 95 % --     Physical Exam  General: Patient is alert and interactive when I enter the room  Head:  The scalp, face, and head appear normal  Eyes:  Conjunctivae are normal  ENT:    The nose is normal    Pinnae are normal    External acoustic canals are normal  Neck:  Trachea midline  CV:  Pulses are normal, RRR   Resp:  No respiratory distress, CTAB   Abdomen:      Soft, non-tender, non-distended  Musc:  Normal  muscular tone    No major joint effusions    No asymmetric leg swelling  Rectal: Black stool in rectal vault   Skin:  No rash or lesions noted  Neuro:  Speech is normal and fluent. Face is symmetric.     Moving all extremities well.   Psych: Awake. Alert.  Normal affect.  Appropriate interactions.    Emergency Department Course   ECG:  Indication: chest pain  Completed at 0016.  Read at 0020.   Rate 83 bpm. MI interval 242. QRS duration 98. QT/QTc 376/441. P-R-T axes 43 37 97.  Sinus rhythm with 1st degree AV block  Nonspecific ST and T wave abnormality  Abnormal ECG.     Completed at 0318.  Read at 0319.   Rate 76 bpm. MI interval 232. QRS duration 96. QT/QTc 388/436. P-R-T axes 49 50 107.  Sinus rhythm with 1st degree AV block  Nonspecific ST and T wave abnormality  Abnormal ECG    Completed at 0438.  Read at 0438.   Rate 73 bpm. MI interval 208. QRS duration 92. QT/QTc 390/429. P-R-T axes 46 39 108.  Normal sinus rhythm  Nonspecific ST and T wave abnormality  Abnormal ECG    Imaging:  Radiographic findings were communicated with the patient who voiced understanding of the findings.    XR Chest 2 views  Mild cardiomegaly. Mild hazy opacities probably due to  edema, though less prominent than on the prior study. No consolidation  or other acute findings. Right chest wall cardiac device in place.  Reading per radiology.     XR Chest Port 1 view  Shallow inspiration. Mild cardiomegaly. Slight perihilar  interstitial prominence again noted and may be due to edema. No  pulmonary consolidation or significant change. Right chest wall  cardiac device.  Reading per radiology.    Laboratory:  CBC: HGB 6.6 (L)  (L) o/w WNL. (WBC 6.7)  CMP:  (L) Glucose 204 (H) BUN 83 (H) Creatinine 9.07 (H) GFR 5(L)  o/w AWNL     Lipase: 168   Lactic Acid (Collected at 0020): 1.3     Troponin (Collected 0020): 0.035  ABO/Rh: O Pos Neg    Stool Occult: Positive  Blood Component: red blood cells leukocyte reduced    0330 - ISTAT  Lactate: pH 7.36, pCO2 38 (L), pO2 44, Bicarbonate 22, Lactic Acid 1.0  Blood gas venous and oxyhgb: pH 7.35 pCO2 41 pO2 46 Bicarbonate 22 PlO2 21   Oxyhemoglobin 72 Base deficit 3.1    Transfusion Reaction Evaluation: Pending    Emergency Department Course:  Nursing notes and vitals reviewed. 0012 I performed an exam of the patient as documented above.     IV inserted. Blood drawn. This was sent to the lab for further testing, results above.    The patient provided a stool sample here in the emergency department. This was sent for laboratory testing, findings above.    The patient was sent for a chest x-ray and chest port x-ray while in the emergency department, findings above.     0133 I rechecked and updated the patient.    0155 I spoke with Dr. Fletcher, hospitalist, who agreed to admit the patient.    0200 I rechecked and updated the patient.    0303 I rechecked the patient and discussed the results of his workup thus far.     0506 I spoke with Dr. Fletcher, hospitalist, who agreed to admit the patient.    Findings and plan explained to the Patient who consents to observation or admission. Discussed the patient with Dr. Fletcher, who will place the patient in cardiac bed for further monitoring, evaluation, and treatment.    I personally reviewed the laboratory results with the Patient and answered all related questions prior to admission.    Impression & Plan      Medical Decision Making:  Donald Raza is a 69 year old male with a history of coronary artery disease, end stage renal disease, and HIV who presents with chest pain and shortness of breath. Patient arrives and his chest pain is improved as he had gotten nitroglycerin by medics. His EKG revealed no STEMI. Blood work was obtained which revealed no abnormal troponin, but his hemoglobin was 6.6. He has had multiple episodes of diarrhea recently, but not blood. He had some vomiting earlier today. He has black stools, but reports that this is from his medication. He  clearly had black stools in his rectal vaults which unfortunately was stool occult positive. Patient states that he gets infusions quite frequently with his end stage renal. He did see GI recently and he was told that if he starts to have GI bleeding they will consider capsul study. He has had an endoscopy and a colonoscopy in 2017. Here he otherwise appears well. We will give him 1 unit of blood given his PRBC for presumed bleeding however it could be exacerbated by his end stage renal. His creatinine is elevated, but we know his is end stage renal. We will go slow on the blood. Patient will be admitted to cardiac tele for cardiac rule out and transfusion and further workup of his possible GI bleed.    Addendum: patient had received about 10 cc of the PRBCs and then started to complain of shortness of breath.  Patient was clearly tachypneic and had some mild wheezing.  EKG was done which was unremarkable.  Chest x-ray was unchanged.  We stopped his blood and considered him possible transfusion reaction.  Patient slowly started to improve we will hold off on further PRBCs.  He likely needs dialysis given his fluid overload state.  He did have a short run of sustained VT but converted.  He otherwise feels improved.  Discussed with hospitalist.    Diagnosis:    ICD-10-CM   1. Gastrointestinal hemorrhage with melena K92.1               Disposition:  Admitted to Dr. Fletcher for a cardiac bed.    IDc, am serving as a scribe on 7/26/2019 at 12:40 AM to personally document services performed by Es Araiza MD based on my observations and the provider's statements to me.     Dc Rios  7/26/2019   [facility] EMERGENCY DEPARTMENT       Es Araiza MD  07/26/19 0532

## 2019-07-26 NOTE — ED TRIAGE NOTES
"Patient presents to ED via EMS from home due to SOB, CP .     Had dialysis this AM.  Developed chest discomfort \" felt like something felt stuck in chest\" had an emesis. C/o 8/10 pain    /108   given   324mg ASA  2 SL nitrogylcerin with relief rating pain 3/10  On arrival patient is alert and oriented x4     C/o SOB   HX MI , DM, Pacemaker   "

## 2019-07-26 NOTE — PROVIDER NOTIFICATION
critical hmg 6.3 down from 6.6. Pt complains of dizziness when standing up other wise asymptomatic

## 2019-07-26 NOTE — PLAN OF CARE
Confirmed with Dr. Armstrong that the 14 orders dated 7-26-19 for dialysis in signed  are intended for 7-27-19. Patient is currently in dialysis and will re-run tomorrow.

## 2019-07-26 NOTE — PROGRESS NOTES
Potassium   Date Value Ref Range Status   07/26/2019 4.5 3.4 - 5.3 mmol/L Final     Lab Results   Component Value Date    HGB 6.3 07/26/2019     Weight: 91 kg (200 lb 9.6 oz)  POST WT 88kg  DIALYSIS PROCEDURE NOTE  Hepatitis status of previous patient on machine log was checked and verified ok to use with this patients hepatitis status.  Patient dialyzed for 2.5 hrs. on a 3 K bath with a net fluid removal of  3L.  A BFR of 400 ml/min was obtained via a LUAF using 15gauge needles.    The patient was seen by Dr. Reed during the treatment.  Total heparin received during the treatment: 0 units. Sites held x 20 min venous and 60min Arterial then  pressure drsgs applied.    Meds  Given:2 Units PRBC's given on the run. Complications:Bleeding after HD on arterial site 60min. MD aware..  Procedure and ESRD teaching access care verbalizes understanding.   See flowsheet in EPIC for further details and post assessment.  Machine water alarm in place and functioning. Transducer pods intact and checked every 15min.  Pt returned via wheelchair  Vascular Access: Aseptic prep done for both on/off.  Report received from: Candance Johnson,R.N.  Report given to: Candance Johnson,R.N.   HEPATITIS B SURFACE ANTIGEN neg  DATE  7/22/19    HEPATITIS B SURFACE ANTIBODY  Succept  DATE 10/28/14  Chlorine/Chloramine water system checked every 4 hours.  Outpatient Dialysis at [facility] 4x week

## 2019-07-26 NOTE — PROGRESS NOTES
Patient seen and examined, assumed care today, H&P reviewed, labs reviewed. I agree with the plan as outlined by Dr. Fletcher. Medications reviewed, resumed most of his medications. Hgb came back at 6.3, he did not tolerate PRBC last night due to SOB.. I discussed with Nephrologist and planning for HD today, and will transfuse 2 units of PRBC with HD. Noted his Troponin is elevated which is likely demand ischemia due to anemia. Check Trop level later today after HD ad transfusion. If it continues to increase, consider involving Cardiologist. Patient has no chest pain at this point. He has h/o elevated trop in the recent past.  I discussed with patient and RN.

## 2019-07-26 NOTE — PROGRESS NOTES
Renal Medicine Inpatient Dialysis Note                                Donald Raza MRN# [id number]   Age: 69 year old YOB: 1950   Date of Admission: 7/26/2019 Hospital LOS: 0          Assessment/Plan:         1.  ESRD              -etiology HIVAN and HTN              -[facility] dialysis [place]              -Dr. Chavez              -AVF              -ran as scheduled 07/25/19  2.  Dialysis Parameters              -3.5 hour runtime              -left AVF              -target weight 88.5 (off 07/25/19 above at 91.2 kg)  3.  Anemia              -MIHAI              -previous transfusion requirement              -previous GI workup non diagnostic  4.  Secondary hyperparathyroidism              -Hectorol  5.  HTN  6.  Atypical CP              -previous extensive cardiac workup   7.  Bradycardia              -pacer in place  8.  HIV  9.  History of recurrent pulmonary edema    Dialysis/today for purpose of facilitating transfusion  Schedule run am     Interval History:       Dialysis run parameters reviewed with dialysis RN at patient bedside:    2.5 hours  AVF  3 to 4 liter UF  2 units red cells    Stable initial portion of run      ROS     GENERAL: NAD, No fever,chills  R: FRANCO  CV: NEGATIVE for chest pain  EXT: no change edema  ROS otherwise negative    Dialysis Parameters:     Vitals were reviewed  Patient Vitals for the past 8 hrs:   BP Temp Temp src Pulse Heart Rate Resp SpO2   07/26/19 1330 152/85 -- -- -- 68 -- --   07/26/19 1315 164/73 98.7  F (37.1  C) -- -- 65 22 --   07/26/19 1300 155/76 98.3  F (36.8  C) -- 64 64 22 --   07/26/19 1253 161/77 98.3  F (36.8  C) -- -- 78 22 98 %   07/26/19 1230 160/74 -- -- -- 75 22 --   07/26/19 1215 149/77 98.7  F (37.1  C) -- -- 64 -- --   07/26/19 1128 140/54 98.3  F (36.8  C) Oral -- 68 20 (!) 89 %   07/26/19 0912 172/78 97.6  F (36.4  C) Oral -- 77 -- 97 %   07/26/19 0818 171/76 98.4  F (36.9  C) Oral 76 -- 22 --   07/26/19 0725 -- -- -- -- -- 24 --    07/26/19 0700 158/66 98.2  F (36.8  C) Oral -- 80 16 97 %     No intake/output data recorded.    Vitals:    07/26/19 0017 07/26/19 0528   Weight: 91 kg (200 lb 9.9 oz) 91 kg (200 lb 9.6 oz)       Current Weight: 91  Dry Weight: 88.5  Dialysis Temp: 36.5  C  Access Device: AVF  Access Site: left  Dialyzer: Revaclear  Dialysis Bath: 3  Sodium Profile: n  UF Goal: 3  Blood Flow Rate (mL/min): 450  Total Treatment Time (hrs): 2.5  Heparin: Low dose as required      EPO dose: n  Zemplar: n  IV Fe: n      Medications and Allergies:     Reviewed      Physical Exam:     Seen and examined during course of dialysis run    /85   Pulse 64   Temp 98.7  F (37.1  C)   Resp 22   Wt 91 kg (200 lb 9.6 oz)   SpO2 98%   BMI 27.98 kg/m      GENERAL: awake, alert, follows  HEENT: NC/AT, PERRLA, EOMI, non icteric, pharynx moist without lesion  RESP: clear  CV: RRR, normal S1 S2  ABDOMEN: S/NT, BS present  MS: no edema  EXT: access canulated without issue  NEURO: speech normal  PSYCH: affect normal/bright    Data:       Recent Labs   Lab 07/26/19  0622   *   POTASSIUM 4.5   CHLORIDE 98   CO2 22   ANIONGAP 12   *   BUN 83*   CR 9.49*   GFRESTIMATED 5*   GFRESTBLACK 6*   PRABHA 9.5     Recent Labs   Lab 07/26/19  0622 07/26/19  0020   HGB 6.3* 6.6*         G David Fuller    [facility] - Nephrology  [phone]

## 2019-07-26 NOTE — PHARMACY-ADMISSION MEDICATION HISTORY
Admission medication history interview status for this patient is complete to the best of ability. See EPIC admission navigator for allergy information, prior to admission medications and immunization status.     Medication history interview source(s):Patient  Medication history resources (including written lists, pill bottles, clinic record):None    Changes made to PTA medication list:  Added: eye drops (ketorolac, moxifloxacin, prednisolone)  Deleted: none  Changed: Novolog 10 units tid to Humalog 12 units tid, Lantus from 20 units bid to 15 units bid.  For patients on insulin therapy: Y   Lantus/levemir/NPH/Mix 70/30 dose: y   (see Med list for doses)   Sliding scale Novolog: Yes, details unknown.  If Yes, do you have a baseline novolog pre-meal dose: yes  Patients eat three meals a day: -  How many episodes of hypoglycemia do you have per week: -   How many missed doses do you have per week: -  How many times do you check your blood glucose per day: -     Any Barriers to therapy - Be specific :  cost of medications, comfortable with giving injections (if applicable), comfortable and confident with current diabetes regimen: -     Actions taken by pharmacist (provider contacted, etc): sticky note to MD to review home meds    Additional medication history information:None    Medication reconciliation/reorder completed by provider prior to medication history? No    Prior to Admission medications    Medication Sig Last Dose Taking? Auth Provider   abacavir (ZIAGEN) 300 MG tablet Take 600 mg by mouth every evening  7/25/2019 Yes Abstract, Provider   Acetaminophen (TYLENOL PO) Take 325 mg by mouth every 6 hours as needed for mild pain or fever   Yes Unknown, Entered By History   albuterol (PROAIR HFA/PROVENTIL HFA/VENTOLIN HFA) 108 (90 BASE) MCG/ACT Inhaler Inhale 2 puffs into the lungs every 6 hours as needed for shortness of breath / dyspnea or wheezing  Yes Nelson Worthy MD   amLODIPine (NORVASC) 5 MG tablet  Take 1 tablet (5 mg) by mouth 2 times daily 7/25/2019 Yes Danny Azar MD   aspirin EC 81 MG EC tablet Take 1 tablet (81 mg) by mouth daily 7/25/2019 Yes Clemencia Pal MD   atorvastatin (LIPITOR) 40 MG tablet Take 40 mg by mouth At Bedtime 7/25/2019 Yes Unknown, Entered By History   B COMPLEX-C-FOLIC ACID PO Take 1 tablet by mouth At Bedtime  7/25/2019 Yes Reported, Patient   carvedilol (COREG) 25 MG tablet Take 1 tablet (25 mg) by mouth 2 times daily (with meals) 7/25/2019 Yes Danny Azar MD   chlorhexidine (CHLORHEXIDINE) 0.12 % solution Rinse and gargle 15 ml by mouth twice a day as directed.  Yes Abstract, Provider   CLONAZEPAM PO Take 0.5 mg by mouth nightly as needed for anxiety (restless legs)  Yes Unknown, Entered By History   CLOPIDOGREL BISULFATE PO Take 75 mg by mouth every evening  7/25/2019 Yes Unknown, Entered By History   dapsone 100 MG tablet Take 100 mg by mouth At Bedtime  7/25/2019 Yes Reported, Patient   Darunavir Ethanolate (PREZISTA PO) Take 800 mg by mouth At Bedtime. 7/25/2019 Yes Reported, Patient   dolutegravir (TIVICAY) 50 MG tablet Take 50 mg by mouth At Bedtime 7/25/2019 Yes Unknown, Entered By History   gabapentin (NEURONTIN) 300 MG capsule Take 300 mg by mouth as needed May take after HD  Yes Unknown, Entered By History   gabapentin (NEURONTIN) 300 MG capsule Take 300 mg by mouth 2 times daily 7/25/2019 Yes Unknown, Entered By History   imiquimod (ALDARA) 5 % cream Apply topically as needed  Yes Reported, Patient   insulin glargine (LANTUS SOLOSTAR PEN) 100 UNIT/ML pen Inject 15 Units Subcutaneous 2 times daily  7/25/2019 Yes Dot Ansari MD   insulin lispro (HUMALOG PEN) 100 UNIT/ML pen Inject 12 Units Subcutaneous 3 times daily (before meals) 7/25/2019 Yes Reported, Patient   ipratropium - albuterol 0.5 mg/2.5 mg/3 mL (DUONEB) 0.5-2.5 (3) MG/3ML neb solution Take 1 vial (3 mLs) by nebulization every 6 hours as needed for shortness of breath / dyspnea or  wheezing  Yes Catrachito Rosado DO   irbesartan (AVAPRO) 300 MG tablet Take 1 tablet (300 mg) by mouth At Bedtime 7/25/2019 Yes Clemencia Pal MD   Isosorbide Mononitrate  MG TB24 Take 1 tablet (120 mg) by mouth every evening 7/25/2019 Yes Yuki Hinojosa APRN CNP   ketoconazole (NIZORAL) 2 % cream Apply topically daily  7/25/2019 Yes Reported, Patient   ketorolac (ACULAR) 0.5 % ophthalmic solution INSTILL 1 GTT IN OPERATIVE EYE QID STARTING 3 DAYS PRIOR TO SURGERY. CONTINUE AFTER SURGERY FOR 2 WEEKS FOLLOWING 7/25/2019 Yes Unknown, Entered By History   MAGNESIUM OXIDE PO Take 400 mg by mouth At Bedtime 7/25/2019 Yes Unknown, Entered By History   melatonin 5 MG tablet Take 5 mg by mouth At Bedtime 7/25/2019 Yes Reported, Patient   mirtazapine (REMERON) 15 MG tablet Take 15 mg by mouth At Bedtime 7/25/2019 Yes Reported, Patient   moxifloxacin (VIGAMOX) 0.5 % ophthalmic solution 1 drop in operative eye 4x daily starting 3 days prior to surgery, then continue after surgery for 1 week following post op instructions 7/25/2019 Yes Unknown, Entered By History   omega-3 acid ethyl esters (LOVAZA) 1 G capsule Take 2 g by mouth 2 times daily 7/25/2019 Yes Unknown, Entered By History   pantoprazole (PROTONIX) 40 MG EC tablet Take 40 mg by mouth daily 7/25/2019 Yes Unknown, Entered By History   polyethylene glycol (MIRALAX/GLYCOLAX) packet Take 1 packet by mouth daily as needed for constipation  Yes Reported, Patient   prednisoLONE acetate (PRED FORTE) 1 % ophthalmic suspension Start after surgery: 1 drop in operative eye 4x daily for 1 wk, then 3x daily for 1 wk, then 2x daily for 1 wk, then 1x daily for 1 wk 7/25/2019 Yes Unknown, Entered By History   ritonavir (NORVIR) 100 MG capsule Take 1 capsule by mouth At Bedtime  7/25/2019 Yes Reported, Patient   DOXERCALCIFEROL IV Inject 6 mcg into the vein three times a week (with dialysis)   Reported, Patient   epoetin brittni (EPOGEN,PROCRIT) 27279 UNIT/ML injection  "Inject 11,000 Units Subcutaneous three times a week WITH DIALYSIS   Unknown, Entered By History   guaiFENesin (MUCINEX) 600 MG 12 hr tablet Take 1,200 mg by mouth 2 times daily as needed    Unknown, Entered By History   iron sucrose (VENOFER) 20 MG/ML injection Inject 50 mg into the vein once a week WIth dialysis   Unknown, Entered By History   nitroglycerin (NITROSTAT) 0.4 MG SL tablet One tablet under the tongue every 5 minutes if needed for chest pain. May repeat every 5 minutes for a maximum of 3 doses in 15 minutes\"   Lay Paz MD   Nutritional Supplements (NEPRO PO) 1-3 times daily   Unknown, Entered By History   sucroferric oxyhydroxide (VELPHORO) 500 MG CHEW chewable tablet Take 500 mg by mouth 3 times daily (with meals)   Unknown, Entered By History           "

## 2019-07-26 NOTE — H&P
[facility]  Hospitalist H&P    Name: Donald Raza      MRN: [id number]  YOB: 1950    Age: 69 year old  Date of admission: 7/26/2019  Primary care provider: Sukh Owen            Assessment and Plan:   Donald Raza is a 69 year old male with a history of COPD, depression, coronary artery disease status post stenting, end-stage renal disease on hemodialysis, HIV, hypertension, hyperlipidemia, TIA, insulin-dependent diabetes mellitus type 2, bradycardia status post pacemaker, chronic anemia who presents with atypical chest pain.  EKG and troponin are unremarkable.  His chest pain is now resolved.  He also reports some mild shortness of breath and chest x-ray shows pulmonary edema.  His hemoglobin was noted to be 6.6 and he has dark stools which he reports are chronic and related to his iron supplement.  He will be given 1 unit of packed red blood cells.  Being admitted for GI consult for possible bleeding and nephrology consult to arrange dialysis.    1. Atypical chest pain: Unclear etiology but I doubt this is cardiac in etiology.  Initial troponin is within normal range and EKG shows no ischemic findings.  We will continue serial troponins.  I do not think any provocative testing is required at this time.  2. Pulmonary edema: Actually not hypoxic chest x-ray does show pulmonary edema in the context of end-stage renal disease on hemodialysis.  Will request nephrology consult to arrange dialysis.  3. Acute on chronic anemia: Periodically requires blood transfusions in the outpatient setting.  He reports having dark stools but he relates this to his iron supplement.  He has had previous EGD and colonoscopy which did not identify any source of bleeding but gastropathy was noted.  We will continue oral proton pump inhibitor and n.p.o. status.  Request GI consultation to see if endoscopic studies are warranted.  He is being given 1 unit of packed red blood cells in the emergency  department.  4. End-stage renal disease on hemodialysis: Request nephrology consultation.  He reports dialyzing on a Monday, Tuesday, Thursday, and Saturday schedule.  He tells me that his last dialysis was on Thursday.  He has a left upper extremity fistula.  5. HIV: Continue his home regimen and request infectious disease consultation to assist with managing this.  6. Diabetes mellitus: Resume prior to admission Lantus but hold his NovoLog meal coverage as he will be n.p.o.  Order medium sliding scale insulin.  7. Coronary artery disease status post stenting: Continue prior to admission aspirin, Plavix, carvedilol, door and atorvastatin.  8. Hypertension: Resume prior to admission medications including Coreg, amlodipine, Imdur, and irbesartan.  Blood pressure stable.  9. Bradycardia status post pacemaker: Currently normal sinus rhythm and not being paced.    Code status: Full.  Admit to inpatient status.  Prophylaxis: PCD's.  Disposition: Home 1 to 2 days.    Addendum: I was informed from ED that patient received about 10 ml PRBC and developed abrupt dyspnea so the transfusion was aborted.  He also had brief run of NSVT and is now back in NSR.  I would recommend he complete HD and revisit possible transfusion afterward as he is already volume overloaded.            Chief Complaint:   Chest pain.         History of Present Illness:   Donald Raza is a 69 year old male who presents with pain.  History was obtained from my discussion with the patient at the bedside.  I also discussed the case with the ED provider.  The electronic medical record was also reviewed.    Patient tells me that his chest pain started yesterday morning.  He felt like something was stuck in his throat.  He went to a doctor's appointment and then returned home and had several episodes of nonbloody emesis.  He called EMS and was noted to be hypertensive.  He was given 2 aspirin and nitroglycerin and came to the emergency department.    He  also reports of shortness of breath.  Now he feels as though everything is back to normal and he is asymptomatic.  Vital signs in the emergency department were stable without hypoxia or fever.  Troponin is within normal limits and EKG is unremarkable.  Hemoglobin was 6.6 and he is being given 1 unit of packed red blood cells.  Fecal occult blood testing was positive and he was noted to have dark stools but tells me that he takes an iron supplement so he always has dark stools.            Past Medical History:     Past Medical History:   Diagnosis Date     Allergic rhinitis      Anemia due to chronic kidney disease 10/21/2011     CAD S/P percutaneous coronary angioplasty 6/15/2015     Cataract      CKD (chronic kidney disease)      Colon cancer (H)      COPD (chronic obstructive pulmonary disease) (H)      Depression      Dilated aortic root (H) 5/6/2016     Diverticulitis      ESRD (end stage renal disease) on dialysis (H) 5/6/2016     Gout      Human immunodeficiency virus (HIV) disease (H)      Hx of squamous cell carcinoma 03/23/2007     Hypertension 2010     Impotence of organic origin      Increased prostate specific antigen (PSA) velocity 08/08/2016    Awaiting bx on blood thinner     Mixed hyperlipidemia      Pulmonary HTN (H)     Mod     TIA (transient ischemic attack) 5/2016     Type 2 diabetes mellitus (H) age 50             Past Surgical History:     Past Surgical History:   Procedure Laterality Date     ANGIOGRAM  03-04-16    No culprit lesions, stents widely patent      ANGIOGRAM  05-06-16    Cutting balloon ptca=Diag     APPENDECTOMY  2000     CHOLECYSTECTOMY, LAPOROSCOPIC       COLON SURGERY       COLOSTOMY  09/30/1999    Temporary for diverticulitis     EP PACEMAKER N/A 5/2/2019    Procedure: EP Pacemaker;  Surgeon: Angelique Perera MD;  Location:  HEART CARDIAC CATH LAB     HC LEFT HEART CATHETERIZATION  03/12/2018    No significant change  Elevated LVEDp  LVEF 30% No PCI     HEART  CATH, ANGIOPLASTY  16    LAD PCI. Stented with a 3.0 x 8 mm Xience Alpine stent.     IR DIALYSIS FISTULOGRAM LEFT  2019     STENT, CORONARY, S660 15/18  2015    VANITA=Diag, PTCA=LAD     STENT, CORONARY, S660 15/18  2015    VANITA=LAD             Social History:     Social History     Tobacco Use     Smoking status: Former Smoker     Packs/day: 2.00     Years: 41.00     Pack years: 82.00     Types: Cigarettes     Last attempt to quit:      Years since quittin.5     Smokeless tobacco: Never Used   Substance Use Topics     Alcohol use: No     Alcohol/week: 0.0 oz             Family History:   The family history was fully reviewed and non-contributory in this case.         Allergies:     Allergies   Allergen Reactions     Calcium Acetate Other (See Comments)     Other reaction(s): Other, see comments  Pain in chest and back  Pain in chest area (sensitive skin)      Contrast Dye Other (See Comments)     Contrast nephropathy     Diagnostic X-Ray Materials Other (See Comments)     PN: renal failure     Lisinopril      Sulfa Drugs              Medications:     Prior to Admission medications    Medication Sig Last Dose Taking? Auth Provider   abacavir (ZIAGEN) 300 MG tablet Take 600 mg by mouth every evening    Abstract, Provider   Acetaminophen (TYLENOL PO) Take 325 mg by mouth every 6 hours as needed for mild pain or fever    Unknown, Entered By History   albuterol (PROAIR HFA/PROVENTIL HFA/VENTOLIN HFA) 108 (90 BASE) MCG/ACT Inhaler Inhale 2 puffs into the lungs every 6 hours as needed for shortness of breath / dyspnea or wheezing   Nelson Worthy MD   amLODIPine (NORVASC) 5 MG tablet Take 1 tablet (5 mg) by mouth 2 times daily   Danny Azar MD   aspirin EC 81 MG EC tablet Take 1 tablet (81 mg) by mouth daily   Clemencia Pal MD   atorvastatin (LIPITOR) 40 MG tablet Take 40 mg by mouth At Bedtime   Unknown, Entered By History   B COMPLEX-C-FOLIC ACID PO Take 1 tablet by mouth At  Bedtime    Reported, Patient   carvedilol (COREG) 25 MG tablet Take 1 tablet (25 mg) by mouth 2 times daily (with meals)   Danny Azar MD   chlorhexidine (CHLORHEXIDINE) 0.12 % solution Rinse and gargle 15 ml by mouth twice a day as directed.   Abstract, Provider   CLONAZEPAM PO Take 0.5 mg by mouth nightly as needed for anxiety (restless legs)   Unknown, Entered By History   CLOPIDOGREL BISULFATE PO Take 75 mg by mouth every evening    Unknown, Entered By History   dapsone 100 MG tablet Take 100 mg by mouth At Bedtime    Reported, Patient   Darunavir Ethanolate (PREZISTA PO) Take 800 mg by mouth At Bedtime.   Reported, Patient   dolutegravir (TIVICAY) 50 MG tablet Take 50 mg by mouth At Bedtime   Unknown, Entered By History   DOXERCALCIFEROL IV Inject 6 mcg into the vein three times a week (with dialysis)   Reported, Patient   epoetin brittni (EPOGEN,PROCRIT) 64720 UNIT/ML injection Inject 11,000 Units Subcutaneous three times a week WITH DIALYSIS   Unknown, Entered By History   gabapentin (NEURONTIN) 300 MG capsule Take 300 mg by mouth as needed May take after HD   Unknown, Entered By History   gabapentin (NEURONTIN) 300 MG capsule Take 300 mg by mouth 2 times daily   Unknown, Entered By History   guaiFENesin (MUCINEX) 600 MG 12 hr tablet Take 1,200 mg by mouth 2 times daily as needed    Unknown, Entered By History   imiquimod (ALDARA) 5 % cream Apply topically as needed   Reported, Patient   insulin aspart (NOVOLOG FLEXPEN) 100 UNIT/ML pen Inject 10 Units Subcutaneous 3 times daily (with meals)   Reported, Patient   insulin glargine (LANTUS SOLOSTAR PEN) 100 UNIT/ML pen Inject 20 Units Subcutaneous 2 times daily   Dot Ansari MD   ipratropium - albuterol 0.5 mg/2.5 mg/3 mL (DUONEB) 0.5-2.5 (3) MG/3ML neb solution Take 1 vial (3 mLs) by nebulization every 6 hours as needed for shortness of breath / dyspnea or wheezing   Catrachito Rosado,    irbesartan (AVAPRO) 300 MG tablet Take 1 tablet  "(300 mg) by mouth At Bedtime   Clemencia Pal MD   iron sucrose (VENOFER) 20 MG/ML injection Inject 50 mg into the vein once a week WIth dialysis   Unknown, Entered By History   Isosorbide Mononitrate  MG TB24 Take 1 tablet (120 mg) by mouth every evening   Yuki Hinojosa, APRN CNP   ketoconazole (NIZORAL) 2 % cream Apply topically daily    Reported, Patient   MAGNESIUM OXIDE PO Take 400 mg by mouth At Bedtime   Unknown, Entered By History   melatonin 5 MG tablet Take 5 mg by mouth At Bedtime   Reported, Patient   mirtazapine (REMERON) 15 MG tablet Take 15 mg by mouth At Bedtime   Reported, Patient   nitroglycerin (NITROSTAT) 0.4 MG SL tablet One tablet under the tongue every 5 minutes if needed for chest pain. May repeat every 5 minutes for a maximum of 3 doses in 15 minutes\"   Lay Paz MD   Nutritional Supplements (NEPRO PO) 1-3 times daily   Unknown, Entered By History   omega-3 acid ethyl esters (LOVAZA) 1 G capsule Take 2 g by mouth 2 times daily   Unknown, Entered By History   pantoprazole (PROTONIX) 40 MG EC tablet Take 40 mg by mouth daily   Unknown, Entered By History   polyethylene glycol (MIRALAX/GLYCOLAX) packet Take 1 packet by mouth daily as needed for constipation   Reported, Patient   ritonavir (NORVIR) 100 MG capsule Take 1 capsule by mouth At Bedtime    Reported, Patient   sucroferric oxyhydroxide (VELPHORO) 500 MG CHEW chewable tablet Take 500 mg by mouth 3 times daily (with meals)   Unknown, Entered By History             Review of Systems:   A Comprehensive greater than 10 system review of systems was carried out.  Pertinent positives and negatives are noted above.  Otherwise negative for contributory information.           Physical Exam:   Blood pressure 177/81, pulse 74, temperature 98.4  F (36.9  C), temperature source Oral, resp. rate 20, weight 91 kg (200 lb 9.9 oz), SpO2 97 %.  Wt Readings from Last 1 Encounters:   07/26/19 91 kg (200 lb 9.9 oz)     Exam:  GENERAL: No " apparent distress. Awake, alert, and fully oriented.  HEENT: Normocephalic, atraumatic. Extraocular movements intact.  CARDIOVASCULAR: Regular rate and rhythm without murmurs or rubs. No S3.  PULMONARY: Clear to auscultation bilaterally.  ABDOMINAL: Soft, non-tender, non-distended. Bowel sounds normoactive.   EXTREMITIES: No cyanosis or clubbing. No appreciable edema. LUE fistula.  NEUROLOGICAL: CN 2-12 grossly intact, no focal neurological deficits.  DERMATOLOGICAL: No rash, ulcer, bruising, nor jaundice.          Data:   EKG:  Personally reviewed. NSR non specific T wave abnormalities, normal intervals.    Laboratory:  Recent Labs   Lab 07/26/19 0020   WBC 6.7   HGB 6.6*   HCT 21.5*   *   *     Recent Labs   Lab 07/26/19 0020   *   POTASSIUM 4.5   CHLORIDE 96   CO2 22   ANIONGAP 11   *   BUN 83*   CR 9.07*   GFRESTIMATED 5*   GFRESTBLACK 6*   PRABHA 9.4     Recent Labs   Lab 07/26/19  0020   AST 18   ALT 18   ALKPHOS 123   BILITOTAL 0.4     Recent Labs   Lab 07/26/19  0020   LACT 1.3     Recent Labs   Lab 07/26/19  0020   LIPASE 168     Recent Labs   Lab 07/26/19  0020   TROPI 0.035     No results for input(s): CULT in the last 168 hours.    Imaging:  Recent Results (from the past 24 hour(s))   XR Chest 2 Views    Narrative    XR CHEST 2 VW  7/26/2019 1:25 AM     INDICATION: Shortness of breath, chest pain.    COMPARISON: 5/13/2019.      Impression    IMPRESSION: Mild cardiomegaly. Mild hazy opacities probably due to  edema, though less prominent than on the prior study. No consolidation  or other acute findings. Right chest wall cardiac device in place.    MD Christian CLARK DO MPH  [facility] Hospitalist  [address] [place] [address]  Pager: [phone]  07/26/2019

## 2019-07-26 NOTE — CONSULTS
RENAL CONSULTATION NOTE      REFERRING MD:  Chance      REASON FOR CONSULTATION:  ESRD management      A/P:     1.  ESRD   -etiology HIVAN and HTN   -[facility] dialysis [facility]   -Dr. Chavez   -AVF   -ran as scheduled 07/25/19  2.  Dialysis Parameters   -3.5 hour runtime   -left AVF   -target weight 88.5 (off 07/25/19 above at 91.2 kg)  3.  Anemia   -MIHAI   -previous transfusion requirement   -previous GI workup non diagnostic  4.  Secondary hyperparathyroidism   -Hectorol  5.  HTN  6.  Atypical CP   -previous extensive cardiac workup   7.  Bradycardia   -pacer in place  8.  HIV  9.  History of recurrent pulmonary edema      Additional dialysis/UF today as requires transfusion  Will not tolerate additional volume given over target weight    Next 07/27/18 to maintain outpatient schedule        HPI:     Admitted with atypical CP that resolved rapidly per review  Currently CP free    SOB endorsed as well with most volume on CXR  Off at 91.2 kg last run well above dry weight of 88.5 kg     Hgb found to be 6.6 g/dl.   GI work up in process    Outpatient dialysis parameters and run records reviewed    Comfortable at present         ROS:      A complete 10 point review of systems was performed and is negative except as noted above.    PMH:    Past Medical History:   Diagnosis Date     Allergic rhinitis      Anemia due to chronic kidney disease 10/21/2011     CAD S/P percutaneous coronary angioplasty 6/15/2015     Cataract      CKD (chronic kidney disease)      Colon cancer (H)      COPD (chronic obstructive pulmonary disease) (H)      Depression      Dilated aortic root (H) 5/6/2016     Diverticulitis      ESRD (end stage renal disease) on dialysis (H) 5/6/2016     Gout      Human immunodeficiency virus (HIV) disease (H)      Hx of squamous cell carcinoma 03/23/2007     Hypertension 2010     Impotence of organic origin      Increased prostate specific antigen (PSA) velocity 08/08/2016    Awaiting bx on blood thinner      Mixed hyperlipidemia      Pulmonary HTN (H)     Mod     TIA (transient ischemic attack) 5/2016     Type 2 diabetes mellitus (H) age 50       PSH:    Past Surgical History:   Procedure Laterality Date     ANGIOGRAM  03-04-16    No culprit lesions, stents widely patent      ANGIOGRAM  05-06-16    Cutting balloon ptca=Diag     APPENDECTOMY  2000     CHOLECYSTECTOMY, LAPOROSCOPIC       COLON SURGERY       COLOSTOMY  09/30/1999    Temporary for diverticulitis     EP PACEMAKER N/A 5/2/2019    Procedure: EP Pacemaker;  Surgeon: Angelique Perera MD;  Location:  HEART CARDIAC CATH LAB     HC LEFT HEART CATHETERIZATION  03/12/2018    No significant change  Elevated LVEDp  LVEF 30% No PCI     HEART CATH, ANGIOPLASTY  08-18-16    LAD PCI. Stented with a 3.0 x 8 mm Xience Alpine stent.     IR DIALYSIS FISTULOGRAM LEFT  1/25/2019     STENT, CORONARY, S660 15/18  12/2015    VANITA=Diag, PTCA=LAD     STENT, CORONARY, S660 15/18  06/2015    VANITA=LAD       MEDICATIONS:    No current outpatient medications on file.       ALLERGIES:    Allergies as of 07/26/2019 - Reviewed 07/26/2019   Allergen Reaction Noted     Calcium acetate Other (See Comments) 07/27/2017     Contrast dye Other (See Comments) 08/22/2014     Diagnostic x-ray materials Other (See Comments) 09/24/2012     Lisinopril  07/23/2012     Sulfa drugs  07/23/2012       FH:    Family History   Problem Relation Age of Onset     Heart Disease Brother 40        CABG     Kidney Disease Sister      Hypertension Sister      Heart Disease Brother         Dilated aorta       SH:    Social History     Socioeconomic History     Marital status:      Spouse name: Not on file     Number of children: Not on file     Years of education: Not on file     Highest education level: Not on file   Occupational History     Not on file   Social Needs     Financial resource strain: Not on file     Food insecurity:     Worry: Not on file     Inability: Not on file     Transportation  needs:     Medical: Not on file     Non-medical: Not on file   Tobacco Use     Smoking status: Former Smoker     Packs/day: 2.00     Years: 41.00     Pack years: 82.00     Types: Cigarettes     Last attempt to quit:      Years since quittin.5     Smokeless tobacco: Never Used   Substance and Sexual Activity     Alcohol use: No     Alcohol/week: 0.0 oz     Drug use: No     Sexual activity: Not on file   Lifestyle     Physical activity:     Days per week: Not on file     Minutes per session: Not on file     Stress: Not on file   Relationships     Social connections:     Talks on phone: Not on file     Gets together: Not on file     Attends Orthodoxy service: Not on file     Active member of club or organization: Not on file     Attends meetings of clubs or organizations: Not on file     Relationship status: Not on file     Intimate partner violence:     Fear of current or ex partner: Not on file     Emotionally abused: Not on file     Physically abused: Not on file     Forced sexual activity: Not on file   Other Topics Concern     Parent/sibling w/ CABG, MI or angioplasty before 65F 55M? Yes      Service Not Asked     Blood Transfusions Not Asked     Caffeine Concern Yes     Comment: 2 cups daily     Occupational Exposure Not Asked     Hobby Hazards Not Asked     Sleep Concern Yes     Stress Concern Not Asked     Weight Concern Not Asked     Special Diet No     Comment:  more proteins     Back Care Not Asked     Exercise Yes     Comment: Cardiac rehab      Bike Helmet Not Asked     Seat Belt Not Asked     Self-Exams Not Asked   Social History Narrative     Not on file       PHYSICAL EXAM:      Vitals were reviewed  Patient Vitals for the past 12 hrs:   BP Temp Temp src Pulse Heart Rate Resp SpO2 Weight   19 1330 152/85 -- -- -- 68 -- -- --   19 1315 164/73 98.7  F (37.1  C) -- -- 65 22 -- --   19 1300 155/76 98.3  F (36.8  C) -- 64 64 22 -- --   19 1253 161/77 98.3  F (36.8  C)  -- -- 78 22 98 % --   19 1230 160/74 -- -- -- 75 22 -- --   19 1215 149/77 98.7  F (37.1  C) -- -- 64 -- -- --   19 1128 140/54 98.3  F (36.8  C) Oral -- 68 20 (!) 89 % --   19 0912 172/78 97.6  F (36.4  C) Oral -- 77 -- 97 % --   19 0818 171/76 98.4  F (36.9  C) Oral 76 -- 22 -- --   19 0725 -- -- -- -- -- 24 -- --   19 0700 158/66 98.2  F (36.8  C) Oral -- 80 16 97 % --   19 0528 183/76 98.1  F (36.7  C) Oral -- 76 16 98 % 91 kg (200 lb 9.6 oz)   19 0500 170/84 -- -- 70 73 14 98 % --   19 0415 (!) 180/92 -- -- 75 80 15 99 % --   19 0345 183/87 -- -- 74 75 18 100 % --   19 0301 -- 98.4  F (36.9  C) -- -- -- -- -- --   19 0300 (!) 175/96 -- -- 72 71 22 98 % --   19 0245 171/81 -- -- 73 74 20 98 % --   19 0239 -- 98.4  F (36.9  C) Oral -- -- -- -- --     Blood pressure range: Systolic (24hrs), Av , Min:140 , Max:191       Vitals:    19 0017 19 0528   Weight: 91 kg (200 lb 9.9 oz) 91 kg (200 lb 9.6 oz)         GENERAL: awake, alert, follows  HEENT: NC/AT, PERRLA, EOMI, non icteric, pharynx moist without lesion  NECK: supple, no masses or adenopathy  RESP:  clear anteriorly  CV: RRR, normal S1 S2  ABDOMEN: soft, nontender, no HSM or masses and bowel sounds normal  MS: no clubbing, cyanosis   SKIN: clear without significant rashes or lesions  NEURO: speech normal and cranial nerves 2-12 intact  PSYCH: affect normal/bright  EXT: warm, no edema      LABS:        Recent Labs   Lab 19  0622   *   POTASSIUM 4.5   CHLORIDE 98   CO2 22   ANIONGAP 12   *   BUN 83*   CR 9.49*   GFRESTIMATED 5*   GFRESTBLACK 6*   PRABHA 9.5     Recent Labs   Lab 19  0020   ALBUMIN 3.7     Recent Labs   Lab 19  0622 19  0020   HGB 6.3* 6.6*       DIAGNOSTICS:  Reviewed      JAMAL Fuller    [facility] consultants  [phone]

## 2019-07-26 NOTE — PLAN OF CARE
Presentation/Diagnosis: Pt admitted 7/26 due to chest pain and SOB. Pt diagnosed with chest pain and pulmonary edema with acute on chronic anemia.   History: ESRD (HD M,Tu,Th,Sat), DM 2, CAD, HLD, HTN, COPD, colon CA, TIA, diverticulitis, pacemaker and cataract surgery to R eye (7/22).   Labs/Protocols: Na: 132, K: 4.5,   Vitals: BP elevated. Other VSS on 2L O2 per NC (RA at baseline). Pt complained of pain to lower extremities throughout shift PRN tylenol given.   Cardiac: Murmur present. Pacemaker in place.  Telemetry: SR with occasional demand A-pacing.   Respiratory: Dyspnea on exertion. Diminished lung sounds.   Neuro: A&Ox4. Calm and cooperative with cares.   GI/: Pt on hemodialysis. AV fistula to L upper arm. Last HD run today with 3kg fluid off and 2 units RBCs given.   Skin: Scattered bruising.   LDAs: PIV x2 to R FA, SL  Diet: Dialysis diet  Activity: Ax2 with FWW and gait belt.  Teaching: Pt educated on plan of care.   Plan: HD run tomorrow AM.     Will continue to monitor.

## 2019-07-26 NOTE — PROVIDER NOTIFICATION
"Md paged: pt hmg came back critical at 6.3 this is down from 6.6. Trop critical 0.172. Denies any chest pain. Complains of constant \"shock feeling to bilateral arms and legs. VSS /66 HR 80 ex RR 24  "

## 2019-07-26 NOTE — CONSULTS
ID consult dictated IMP 1 68 yo male acue chest pain, HIV, well known to mw, no obvious active new infection with this episode    REc continue same HIV tx, call this WE if signs of new infection

## 2019-07-27 NOTE — CONSULTS
Consult Date:  07/26/2019      INFECTIOUS DISEASE CONSULTATION      LOCATION:  Room [address] [facility]      REQUESTING PHYSICIAN:  Dr. Johnson.      IMPRESSION:   1.  A 69-year-old male, very well known to me from numerous prior admissions, currently admitted with some respiratory symptoms and probable cardiac issues, no clearcut systemic or focal infection.   2.  History of chronic HIV infection, followed in the [facility] system.  On long-term highly active antiretroviral program which is controlling his viremia, but T-cell count generally only in the 100 range.   3.  Diabetes mellitus.   4.  End-stage renal disease, on dialysis.   5.  Chronic obstructive lung disease, multiple prior respiratory failure episodes, some with infection, some with cardiac issues and fluid.   6.  History of recurrent pneumonia, although not really an issue for the last year, none with any particular pathogen.  No other underlying immunosuppression that is known and no opportunistic infections in many years.   7.  SULFA ALLERGY.      RECOMMENDATIONS:   1.  Continue his highly active antiretroviral program he is on, along with dapsone.   2.  No clear infection at the present time.  Holding on any further workup.   3.  Call if issues this weekend.      HISTORY:  This 69-year-old male is very well known to us with multiple prior admissions.  He currently is admitted with increasing shortness of breath and chest pain, probable cardiac etiology.  No significant fevers, chills, or sweats.  No increased cough or other signs of infection.  No other focal symptoms of note.      The patient has had numerous prior admissions, most recently in May, with respiratory failure, lung infections, sepsis and other issues.  He has chronic HIV infection, and has been on a antiretroviral program that has controlled it reasonably well, although T-cells generally in the 100s.  He has not really had major opportunistic infections.  He remains on  dapsone prophylaxis.      PAST MEDICAL HISTORY:  Long and complicated, numerous prior admissions and hospitalizations, history of end-stage renal disease on chronic dialysis, history of HIV, history of coronary artery disease, history of prior colon cancer.      ALLERGIES:  SULFA HAS CAUSED A RASH; GETTING DAPSONE FOR PROPHYLAXIS.      MEDICATIONS:  As listed.      REVIEW OF SYSTEMS:  Largely as noted.  No significant focal symptoms, not having chest pain at present.  Has a slight shortness of breath sensation.  No major confusion.  No fevers, chills or sweats.      PHYSICAL EXAMINATION:   GENERAL:  The patient appears his usual self, does not look particularly acutely toxic or ill.  No significant new focal or localizing abnormalities noted on exam.   VITAL SIGNS:  Currently normal.   HEENT:  No thrush or intraoral lesions.  Pupils reactive.   NECK:  Supple and nontender.   HEART:  Slightly tachycardic, no major murmur.   LUNGS:  Crackles bilaterally which are frequently present.   ABDOMEN:  Soft and nontender.   EXTREMITIES:  No major rash or skin lesions, has a trace of edema.      LABORATORY DATA:  No new positive cultures.      Thank you very much for the consultation.  I will follow the patient with you.         ASHOK MI MD             D: 2019   T: 2019   MT: ERIK      Name:     GREGORIO BRUSH   MRN:      [id number]        Account:       [id number]   :      1950           Consult Date:  2019      Document: [id number]       cc: Sukh Mi MD

## 2019-07-27 NOTE — PLAN OF CARE
Much improved following yesterdays transfusion of PRBC and dialysis. Able to wean off O2 with O2 sat 94-96%. Up with SBA to toilet. One large soft brown stool. VSS No edema. Lungs clear. Denies pain and SOB. Retuned to dialysis for todays sched run.

## 2019-07-27 NOTE — PROGRESS NOTES
[facility]     Renal Progress Note       SHORTHAND KEY FOR MY NOTES:  c = with, s = without, p = after, a = before, x = except, asx = asymptomatic, tx = transplant or treatment, sx = symptoms or symptomatic, cx = canceled or culture, rxn = reaction, yday = yesterday, nl = normal, abx = antibiotics, fxn = function, dx = diagnosis, dz = disease, m/h = melena/hematochezia, c/d/l/ha = cramping/dizziness/lightheadedness/headache, d/c = discharge or diarrhea/constipation, f/c/n/v = fevers/chills/nausea/vomiting, cp/sob = chest pain/shortness of breath, tbv = total body volume, rxn = reaction, tdc = tunneled dialysis catheter, pta = prior to admission, hd = hemodialysis, pd = peritoneal dialysis, hhd = home hemodialysis         Assessment/Plan:     1.  ESKD.  Dialysing today to stay on schedule.  Will push fluid removal since BP is high.  A.  Net -2L today.  B.  Next planned run on Monday.    2.  Anemia.  Better p blood.  Feels ok.  A.  Follow hb.  B.  Continue high dose EPO.    3.  HTN.  Pt's BP is high despite meds.    A.  Push fluid removal.  B.  Continue all meds.    4.  Dispo.  He is due to be discharged today.        Interval History:     Pt feels better today p receiving blood on the run yday.  He is not sob and doesn't have any cp/abd pain.  Eating ok.          Medications and Allergies:       - MEDICATION INSTRUCTIONS for Dialysis Patients -   Does not apply See Admin Instructions     abacavir  600 mg Oral QPM     amLODIPine  5 mg Oral BID     aspirin  81 mg Oral Daily     atorvastatin  40 mg Oral At Bedtime     carvedilol  25 mg Oral BID w/meals     clopidogrel  75 mg Oral QPM     dapsone  100 mg Oral At Bedtime     darunavir  800 mg Oral At Bedtime     dolutegravir  50 mg Oral At Bedtime     gabapentin  300 mg Oral BID     insulin aspart  1-6 Units Subcutaneous Q4H     insulin glargine  15 Units Subcutaneous BID     ipratropium - albuterol 0.5 mg/2.5 mg/3 mL  3 mL Nebulization Once     irbesartan   300 mg Oral At Bedtime     isosorbide mononitrate  120 mg Oral QPM     ketorolac  1 drop Ophthalmic 4x Daily     melatonin  5 mg Oral At Bedtime     mirtazapine  15 mg Oral At Bedtime     moxifloxacin  1 drop Ophthalmic TID     omega-3 acid ethyl esters  2 g Oral BID     pantoprazole  40 mg Oral Daily     prednisoLONE acetate  1 drop Ophthalmic 4x Daily     ritonavir  100 mg Oral At Bedtime     sucroferric oxyhydroxide  500 mg Oral TID w/meals     Allergies   Allergen Reactions     Calcium Acetate Other (See Comments)     Other reaction(s): Other, see comments  Pain in chest and back  Pain in chest area (sensitive skin)      Contrast Dye Other (See Comments)     Contrast nephropathy     Diagnostic X-Ray Materials Other (See Comments)     PN: renal failure     Lisinopril      Sulfa Drugs           Physical Exam:     Vitals were reviewed    Heart Rate: 65, Blood pressure 160/52, pulse 60, temperature 98.4  F (36.9  C), temperature source Oral, resp. rate 18, weight 88.6 kg (195 lb 6.4 oz), SpO2 98 %.  Wt Readings from Last 3 Encounters:   07/27/19 88.6 kg (195 lb 6.4 oz)   05/14/19 88.5 kg (195 lb 1.7 oz)   05/06/19 94.3 kg (207 lb 14.4 oz)     No intake or output data in the 24 hours ending 07/27/19 1615    GENERAL APPEARANCE: pleasant, NAD, alert  HEENT:  eyes/ears/nose/neck grossly normal  RESP: lungs cta b c good efforts, no crackles  CV: RRR, nl S1/S2  ABDOMEN: o/s/nt/nd  EXTREMITIES/SKIN: tr ble edema    Pt seen on HD.  Stable run.  Good BFR via LAF using 16G needles.         Data:     CBC RESULTS:     Recent Labs   Lab 07/27/19  0849 07/26/19  0622 07/26/19  0020   WBC 4.8 5.5 6.7   RBC 2.64* 2.03* 2.13*   HGB 8.2* 6.3* 6.6*   HCT 26.3* 20.5* 21.5*   PLT 87* 89* 104*     Basic Metabolic Panel:  Recent Labs   Lab 07/27/19  0849 07/26/19  0622 07/26/19  0020   * 132* 129*   POTASSIUM 4.5 4.5 4.5   CHLORIDE 98 98 96   CO2 25 22 22   BUN 60* 83* 83*   CR 7.88* 9.49* 9.07*   * 103* 204*   PRABHA 9.4 9.5  9.4     INRNo lab results found in last 7 days.   Attestation:   I have reviewed today's relevant vital signs, notes, medications, labs and imaging.    Bipin Chaves MD  [facility] - Nephrology  [phone]

## 2019-07-27 NOTE — PLAN OF CARE
Please see flowsheets for detailed vital signs and assessments.   Neuro: A&O  Vital Signs: stable  Pain: denies  O2: mid 90s 2L NC  Tele: SR occasionally paced  Respiratory: LS diminished, fine crackles over posterior bases  GI: Pt states no BM since 7/23, gave stool softener this shift  : pt on hemodialysis  Skin: fistula dressing to LUE C/D/I  Activity: assist 2  IVF: saline locked  Notable labs: hgb 6.3, check this AM  Protocols: NA  Consults: GI, ID, nephrology  Plan: dialysis again today, nebs, eyedrops  Discharge: 2+ days

## 2019-07-27 NOTE — PROGRESS NOTES
Potassium   Date Value Ref Range Status   07/27/2019 4.5 3.4 - 5.3 mmol/L Final     Lab Results   Component Value Date    HGB 8.2 07/27/2019     Weight: 88.6 kg (195 lb 6.4 oz)  POST WT 86.8kg  DIALYSIS PROCEDURE NOTE  Hepatitis status of previous patient on machine log was checked and verified ok to use with this patients hepatitis status.  Patient dialyzed for 3.25 hrs. on a 3/2 K bath with a net fluid removal of  2L.  A BFR of 350 ml/min was obtained via a LUAF using 16gauge needles.    The patient was seen by Dr. Chaves during the treatment.  Total heparin received during the treatment: 0 units. Sites held x 30 min then  pressure drsgs applied.    Meds  Given: EPO 10,000units IV and Hectoral 2mcg IV Complications: NONE.  Procedure and ESRD teaching access care verbalizies understanding..   See flowsheet in EPIC for further details and post assessment.  Machine water alarm in place and functioning. Transducer pods intact and checked every 15min.  Pt returned via wheelchair  Vascular Access: Aseptic prep done for both on/off.  Report received from: Hien Mendoza R.N.  Report given to:Candance Johnson,R.N.  HEPATITIS B SURFACE ANTIGEN  NEG DATE 7/22/19    HEPATITIS B SURFACE ANTIBODY  Succept DATE 10/28/14  Chlorine/Chloramine water system checked every 4 hours.  Outpatient Dialysis at [facility]

## 2019-07-27 NOTE — DISCHARGE SUMMARY
"[facility]    Discharge Summary  Hospitalist    Date of Admission:  7/26/2019  Date of Discharge:  7/27/2019  Discharging Provider: Octavio Quiroga MD  Date of Service (when I saw the patient): 07/27/19    Discharge Diagnoses   Presented with \"chest pain\" but in fact he had painful swallowing with dysphagia.  Brief episode.  Completely resolved.  Mildly elevated troponin, likely in the setting of end-stage renal disease and demand ischemia due to anemia.  Shortness of breath in the setting of anemia.  Anemia, required 2 units of PRBC transfusion.  No active blood loss.  Anemia of chronic disease with end-stage renal disease.  End-stage renal disease on hemodialysis.  Chronic HIV infection.  On long-term highly active antiretroviral therapy.  Diabetes.  Stable COPD.  Coronary artery disease, history of stenting in the past.  Last echo in May.  Last cardiac angiograms in 10/2017 and 3/2018.  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  Dyslipidemia.  Hypertension.  Chronic thrombocytopenia.    History of Present Illness   Donald Raza is an 69 year old male who presented with \"chest pain\".    Hospital Course     Patient has history of multiple complex medical issues as described above.  He presented to the emergency room for atypical chest pain.  He described the pain as something was stuck in his throat and he was unable to swallow anything.  This was associated with some pain.  He had multiple episodes of vomiting as well.  When EMS evaluated him he was noted to be hypertensive.  Patient was given aspirin and nitroglycerin.  In the emergency room he was chest pain-free.  He also had some shortness of breath which also promptly resolved in the emergency room.    Patient was admitted to internal medicine service.  Troponins were trended which showed mild increase in the troponin.  He also noted to have " "significant elevation of his BNP.  At admission he was also anemic with a hemoglobin of 6.6 which later on dropped to 6.3.    His chest symptoms are thought to be atypical in nature and noncardiac.  Patient had chest pain and shortness of breath presentation back in May when he was evaluated by cardiology team.  He had mild elevation of troponin back then as well.  At that time also it was thought to be noncardiac in nature and conservative  approach was recommended.  At baseline patient does not have any ischemic symptoms either at rest or with activity.  His symptoms of \"something stuck in the throat and difficulty swallowing with pain\" is suggestive more of GI cause due to esophageal dysmotility, rather than cardiac cause.  He never had this kind of symptoms before.  He told me that he has an established gastroenterologist which he sometimes sees for his chronic anemia.  I recommended following up with them if patient has recurrence of the symptoms as he may benefit from an EGD/motility study of the esophagus.  Currently patient is symptom-free.  He is able to have a regular diet without any swallowing difficulty.  His symptoms actually resolved in the emergency room.  Recommended regular cardiology follow-up as before.    Patient also noted to have significant anemia.  He has a history of chronic anemia in the setting of end-stage renal disease.  His hemoglobin was 6.6.  Patient takes iron supplement.  History of dark stools likely due to iron supplements.  Due to his anemia and history of dark stools, GI consultation was obtained.  They recommended no acute GI work-up at this point.  Apparently patient had a recent negative EGD/colonoscopy through outside hospital.  Outpatient follow-up will be appropriate.    Patient was given 2 units of packed RBCs.  Initially the transfusion the night of admission but patient developed shortness of breath.  After discussion with nephrologist, the transfusion was done with " hemodialysis.  Patient received an extra session of hemodialysis yesterday.  He tolerated the transfusion well. No active bleeding has been noted.  Appropriate rise in hemoglobin.    Patient has end-stage renal disease and he follows up with Dr Chavez.  Patient received hemodialysis today, his usual hemodialysis day.  Otherwise he has been doing well and looking forward to go home.    Patient was also seen by ID due to his history of HIV infection.  No changes done to his retroviral medication therapy.  No active infection has been noted.    I personally evaluated and examined the patient on the day of discharge.    Octavio Quiroga MD      Pending Results   These results will be followed up by PCP  Unresulted Labs Ordered in the Past 30 Days of this Admission     No orders found for last 31 day(s).               Primary Care Physician   Sukh Owen        Discharge Disposition   Discharged to home  Condition at discharge: Stable    Consultations This Hospital Stay   INFECTIOUS DISEASES IP CONSULT  NEPHROLOGY IP CONSULT  GASTROENTEROLOGY IP CONSULT  CARE COORDINATOR IP CONSULT    Time Spent on this Encounter   Discharge time: greater than 30 minutes.    Discharge Orders      Medication Therapy Management Referral      Reason for your hospital stay    Painful and difficulty swallowing. ESRD on HD     Follow-up and recommended labs and tests     Follow up with primary care provider, Sukh Owen, within 7 days for hospital follow- up.   Follow-up with regular nephrologist.  Follow-up with regular cardiologist.  Already has an established gastroenterologist who he will follow-up with for repeated symptoms.     Activity    Your activity upon discharge: activity as tolerated     Full Code     Diet    Follow this diet upon discharge: Renal hemodialysis diet     Discharge Medications   Current Discharge Medication List      CONTINUE these medications which have NOT CHANGED    Details   abacavir (ZIAGEN) 300 MG tablet  Take 600 mg by mouth every evening       Acetaminophen (TYLENOL PO) Take 325 mg by mouth every 6 hours as needed for mild pain or fever       albuterol (PROAIR HFA/PROVENTIL HFA/VENTOLIN HFA) 108 (90 BASE) MCG/ACT Inhaler Inhale 2 puffs into the lungs every 6 hours as needed for shortness of breath / dyspnea or wheezing  Qty: 1 Inhaler, Refills: 1    Associated Diagnoses: Cough      amLODIPine (NORVASC) 5 MG tablet Take 1 tablet (5 mg) by mouth 2 times daily  Qty: 60 tablet, Refills: 0    Associated Diagnoses: Hypertensive emergency      aspirin EC 81 MG EC tablet Take 1 tablet (81 mg) by mouth daily  Qty: 30 tablet, Refills: 0    Associated Diagnoses: Coronary artery disease, angina presence unspecified, unspecified vessel or lesion type, unspecified whether native or transplanted heart      atorvastatin (LIPITOR) 40 MG tablet Take 40 mg by mouth At Bedtime      B COMPLEX-C-FOLIC ACID PO Take 1 tablet by mouth At Bedtime       carvedilol (COREG) 25 MG tablet Take 1 tablet (25 mg) by mouth 2 times daily (with meals)  Qty: 60 tablet, Refills: 0    Associated Diagnoses: Hypertensive emergency      chlorhexidine (CHLORHEXIDINE) 0.12 % solution Rinse and gargle 15 ml by mouth twice a day as directed.      CLONAZEPAM PO Take 0.5 mg by mouth nightly as needed for anxiety (restless legs)      CLOPIDOGREL BISULFATE PO Take 75 mg by mouth every evening       dapsone 100 MG tablet Take 100 mg by mouth At Bedtime       Darunavir Ethanolate (PREZISTA PO) Take 800 mg by mouth At Bedtime.      dolutegravir (TIVICAY) 50 MG tablet Take 50 mg by mouth At Bedtime      !! gabapentin (NEURONTIN) 300 MG capsule Take 300 mg by mouth as needed May take after HD      !! gabapentin (NEURONTIN) 300 MG capsule Take 300 mg by mouth 2 times daily      imiquimod (ALDARA) 5 % cream Apply topically as needed      insulin glargine (LANTUS PEN) 100 UNIT/ML pen Inject 15 Units Subcutaneous 2 times daily      insulin lispro (HUMALOG PEN) 100 UNIT/ML  pen Inject 12 Units Subcutaneous 3 times daily (before meals)      ipratropium - albuterol 0.5 mg/2.5 mg/3 mL (DUONEB) 0.5-2.5 (3) MG/3ML neb solution Take 1 vial (3 mLs) by nebulization every 6 hours as needed for shortness of breath / dyspnea or wheezing  Qty: 90 vial, Refills: 1    Associated Diagnoses: Acute respiratory failure with hypoxia (H)      irbesartan (AVAPRO) 300 MG tablet Take 1 tablet (300 mg) by mouth At Bedtime  Qty: 30 tablet, Refills: 0    Associated Diagnoses: Hypertension secondary to other renal disorders      Isosorbide Mononitrate  MG TB24 Take 1 tablet (120 mg) by mouth every evening  Qty: 30 tablet, Refills: 11    Associated Diagnoses: Coronary artery disease involving native heart, angina presence unspecified, unspecified vessel or lesion type; Hypertension secondary to other renal disorders      ketoconazole (NIZORAL) 2 % cream Apply topically daily       ketorolac (ACULAR) 0.5 % ophthalmic solution INSTILL 1 GTT IN OPERATIVE EYE QID STARTING 3 DAYS PRIOR TO SURGERY. CONTINUE AFTER SURGERY FOR 2 WEEKS FOLLOWING  Refills: 1      MAGNESIUM OXIDE PO Take 400 mg by mouth At Bedtime      melatonin 5 MG tablet Take 5 mg by mouth At Bedtime      mirtazapine (REMERON) 15 MG tablet Take 15 mg by mouth At Bedtime      moxifloxacin (VIGAMOX) 0.5 % ophthalmic solution 1 drop in operative eye 4x daily starting 3 days prior to surgery, then continue after surgery for 1 week following post op instructions      omega-3 acid ethyl esters (LOVAZA) 1 G capsule Take 2 g by mouth 2 times daily      pantoprazole (PROTONIX) 40 MG EC tablet Take 40 mg by mouth daily      polyethylene glycol (MIRALAX/GLYCOLAX) packet Take 1 packet by mouth daily as needed for constipation      prednisoLONE acetate (PRED FORTE) 1 % ophthalmic suspension Start after surgery: 1 drop in operative eye 4x daily for 1 wk, then 3x daily for 1 wk, then 2x daily for 1 wk, then 1x daily for 1 wk      ritonavir (NORVIR) 100 MG capsule  "Take 1 capsule by mouth At Bedtime       DOXERCALCIFEROL IV Inject 6 mcg into the vein three times a week (with dialysis)      epoetin brittni (EPOGEN,PROCRIT) 99161 UNIT/ML injection Inject 11,000 Units Subcutaneous three times a week WITH DIALYSIS      guaiFENesin (MUCINEX) 600 MG 12 hr tablet Take 1,200 mg by mouth 2 times daily as needed       iron sucrose (VENOFER) 20 MG/ML injection Inject 50 mg into the vein once a week WIth dialysis      nitroglycerin (NITROSTAT) 0.4 MG SL tablet One tablet under the tongue every 5 minutes if needed for chest pain. May repeat every 5 minutes for a maximum of 3 doses in 15 minutes\"  Qty: 25 tablet, Refills: 3    Associated Diagnoses: Coronary artery disease due to lipid rich plaque; Status post coronary angioplasty      Nutritional Supplements (NEPRO PO) 1-3 times daily      sucroferric oxyhydroxide (VELPHORO) 500 MG CHEW chewable tablet Take 500 mg by mouth 3 times daily (with meals)       !! - Potential duplicate medications found. Please discuss with provider.        Allergies   Allergies   Allergen Reactions     Calcium Acetate Other (See Comments)     Other reaction(s): Other, see comments  Pain in chest and back  Pain in chest area (sensitive skin)      Contrast Dye Other (See Comments)     Contrast nephropathy     Diagnostic X-Ray Materials Other (See Comments)     PN: renal failure     Lisinopril      Sulfa Drugs      Data   Most Recent 3 CBC's:  Recent Labs   Lab Test 07/27/19  0849 07/26/19  0622 07/26/19  0020   WBC 4.8 5.5 6.7   HGB 8.2* 6.3* 6.6*    101* 101*   PLT 87* 89* 104*      Most Recent 3 BMP's:  Recent Labs   Lab Test 07/27/19  0849 07/26/19  0622 07/26/19  0020   * 132* 129*   POTASSIUM 4.5 4.5 4.5   CHLORIDE 98 98 96   CO2 25 22 22   BUN 60* 83* 83*   CR 7.88* 9.49* 9.07*   ANIONGAP 9 12 11   PRABHA 9.4 9.5 9.4   * 103* 204*     Most Recent 2 LFT's:  Recent Labs   Lab Test 07/26/19  0020 05/02/19  0427   AST 18 11   ALT 18 17   ALKPHOS " 123 174*   BILITOTAL 0.4 0.5     Most Recent INR's and Anticoagulation Dosing History:  Anticoagulation Dose History     Recent Dosing and Labs Latest Ref Rng & Units 9/19/2017 9/20/2017 10/10/2017 11/21/2017 12/5/2018 5/2/2019 5/13/2019    INR 0.86 - 1.14 0.94 1.01 0.95 0.97 1.03 1.06 1.10        Most Recent 3 Troponin's:  Recent Labs   Lab Test 07/26/19  1009 07/26/19  0622 07/26/19  0020  03/08/18  0542  12/25/17  0856  04/15/17  0820   TROPI 0.251* 0.172* 0.035   < > 0.043   < >  --    < >  --    TROPONIN  --   --   --   --  0.02  --  0.02  --  0.02    < > = values in this interval not displayed.     Most Recent Cholesterol Panel:  Recent Labs   Lab Test 09/02/18  0629   CHOL 127   LDL Cannot estimate LDL when triglyceride exceeds 400 mg/dL  48   HDL 26*   TRIG 447*     Most Recent 6 Bacteria Isolates From Any Culture (See EPIC Reports for Culture Details):  Recent Labs   Lab Test 02/11/19 2008 02/11/19 2001 01/03/19  1359 01/03/19  1242 12/06/18  0920 12/05/18  2213   CULT No growth No growth No growth No growth 10,000 to 50,000 colonies/mL  Streptococcus dysgalactiae serogroup C/G  *  10,000 to 50,000 colonies/mL  mixed urogenital subhash   No growth     Most Recent TSH, T4 and A1c Labs:  Recent Labs   Lab Test 07/26/19  0622 05/13/19  0600   TSH  --  4.48*   T4  --  0.70*   A1C Canceled, Test credited  --

## 2019-07-28 NOTE — PLAN OF CARE
Patient returned from dialysis with discharge orders. Discharge orders reviewed with patient who verbalized understanding. No home meds. Patient missing belongings of a robe, shirt and under shorts. Attempted to locate in ER and lost and found but unable to locate. Attempted to give patient rep business card to patient but he declined.

## 2019-08-07 NOTE — PROGRESS NOTES
HPI:  Donald Raza is a 69 year old male who presents for cardiology appointment.  Unfortunately patient he was lost to follow-up and has been hospitalized frequently in the last year.  He is a patient of Dr. Diaz.  His  medical history includes     1. Coronary artery disease.  Multiple NSTEMI with diagonal and LAD stenting, repeat LAD/diagonal stenting due to in-stent restenosis (8/2016).    2. HFpEF. LVEDP of 40 (3/12/2018)  3. Severe symptomatic bradycardia s/p dual-chamber [employer] permanent pacemaker (5/2019)  4. Diabetes  5. HIV on therapy  6. Anemia  7. ESRD on dialysis  8. Hypertension.   Followed by nephrology    Diagnostics:    ECHO (5/2019) revealed EF 60-65%, mild ascending aorta    Device check (6/2019) revealed 21% a paced, 100% V paced, mode DDD 60-1 30 underlying rhythm sinus rhythm/sinus bradycardia 50-60 bpm, good heart rate variability, stable leads, no atrial or ventricle arrhythmias.    Nuclear Stress test (12/2018) revealed no ischemia with EF 59%    Coronary Catheretization (3/2018) revealed widely patent stents, normal FFR of diagonal and distal LAD.  50% diagonal stenosis and 50% stenosis in lateral branch of bifurcating diagonal.  50-70% stenosis in the small caliber medial branch.  30% per LV gram(no PCI)     Coronary Catheretization (10/2017) for chest discomfort showed a patent diagonal stent and a patent mid to distal LAD stent. The ostium of the diagonal artery had a 60-70% stenosis and was FFR negative. The distal left anterior descending artery is also FFR negative. The first diagonal does bifurcate with the more medial branch which is quite small has a 70% stenosis (No PCI)    He is a complex cardiovascular history.  He he suffered from an MI and underwent stenting to his LAD in 2015.  In addition, he has stenting to the diagonal, repeat angioplasty to the LAD, multiple stenting to his diagonal due to in-stent restenosis last being 8/2016.  He was hospitalized March 2018  for chest pain and CHF exacerbation and underwent an angiogram with no significant changes, his LVEDP was elevated to 40, and his ejection fraction was found to be 30% (ECHO showed 60%) no PCI interventions were completed.  During the visit he also received 4 units of packed red blood cells and he was started on hydralazine and his losartan was changed to irbesartan.    He then saw Yuki Hinojosa 4/2018 where he was having chest pain while undergoing dialysis, took a nitroglycerin which relieved his pain and was to follow up with Dr. Diaz in 6 month and unfortunately has gotten lost to follow up and only seen in the hospital.      He was in the ED 7/2018 for generalized weakness due to anemia and received a transfusion of PRBC. He was hospitalized in 9/2018 for dialysis disequilibrium syndrome and CVA was ruled out.  In December 2018 & January 2019 x 2 he was hospitalzied for sepsis, respiratory distress, and chest pain respectfully.      In May 2019, he was hospitalized for shortness of breath, chest pain, and severe symptomatic bradycardia.  He subsequently underwent a dual-chamber permanent pacemaker.    Again in May 2019 he was hospitalized for chest pain associated with elevated blood pressure (210/140) and his hydralazine was stopped and he was started on amlodipine 10 mg    In June 2019, he saw GI MD at OSH who stated I do not feel a need to repeat the esophagogastroduodenoscopy or colonoscopy at this time, especially without any evidence for luminal gastroenterologic blood loss. The outstanding question is whether or not this gentleman needs a capsule endoscopy to evaluate for occult GIB as the etiology of his anemia. I suspect his anemia is secondary to ESRD and decreased RBC production, but I cannot rule out a small bowel source. Not currently iron deficient, but he receives IV venofer weekly and oral iron so not entirely reliable.    In July 2019 he was hospitalized again for chest pain and dysphagia.  His  hemoglobin was found to be 6.3 his BNP had elevated and he had a mild increase in his troponin however the pain was thought to be noncardiac and conservative measures were recommended such as following with GI for EGD/motility study.    Considering he has a very complex history, he does not have any CV complaints.  He has shortness of breath intermittently for multiple reasons including COPD, anemia, and fluid overload.  For this COPD he uses his nebulizers, and he goes to dialysis 4x/week, and he notices when his hgb decreases to 7.5 gets SOB/FRANCO and presents for transfusions.   He denies palpitations.  Dizziness frequently occurs after dialysis.  He is able to sleep flat in bed with no problems.  Denies regular chest pain, pressure or angina symptoms.    Nephrology manages his Blood pressure.        ASSESSMENT AND PLAN    Coronary artery Disease    Suffered an MI and underwent stenting to his LAD in 2015.     Has stenting to the diagonal, repeat angioplasty to the LAD, multiple stenting to his diagonal due to in-stent restenosis last being 8/2016    Has had 2 coronary catheretization since 10/2017 and 3/2018 with no interventions.  However the 10/2017 report reveals  50% diagonal stenosis and 50% stenosis in lateral branch of bifurcating diagonal.  50-70% stenosis in the small caliber medial branch.    He is currently taking aspirin 81 mg, atorvastatin 40 mg daily, coreg 25 mg twice daily and Plavix.      Per Dr. Diaz note (1/2018),  At this time, I recommend continuing current cardiac medications of dual antiplatelet therapy, high-intensity statin and it interstitial blocker, long-acting nitrate, calcium channel blocker. He had issues with anemia but this does not look like a GI blood loss tissue, likely due to end-stage renal disease. However, in future if there is any concern about GI bleeding, Plavix can be discontinued and he can only use aspirin for antiplatelet therapy    He does have intermittent chest  pain which he often associates with anemia    Due to history of anemia with multiple need for PRBC will stop Plavix and continue ASA 81 mg daily.        Severe symptomatic bradycardia s/p dual-chamber [employer] permanent pacemaker (5/2019)    Stable leads    Normal device function     Follow up with device clinic on a quarterly basis     HFpEF.     LVEDP of 40 (3/12/2018)    ECHO (5/2019) revealed EF 60-65%, mild ascending aorta    Receives dialysis 3 days per week for maintenance of volume    Hypertension    On Multiple medications and Managed by Nephrology.     Plan:     stop Plavix and continue ASA 81 mg daily    Follow up with Dr. Diaz in 6 months    Follow with device clinic every quarter    Patient expresses understanding and agreement with the plan.     I appreciate the chance to help with Donald Raza Please let me know if you have any questions or concerns.    DEDRICK Henderson, CNP    This note was completed in part using Dragon voice recognition software. Although reviewed after completion, some word and grammatical errors may occur.    Orders Placed This Encounter   Procedures     Follow-Up with Cardiologist     No orders of the defined types were placed in this encounter.    Medications Discontinued During This Encounter   Medication Reason     gabapentin (NEURONTIN) 300 MG capsule Medication Reconciliation Clean Up         Encounter Diagnoses   Name Primary?     Cardiac pacemaker in situ      Coronary artery disease involving native coronary artery of native heart without angina pectoris Yes       CURRENT MEDICATIONS:  Current Outpatient Medications   Medication Sig Dispense Refill     abacavir (ZIAGEN) 300 MG tablet Take 600 mg by mouth every evening        Acetaminophen (TYLENOL PO) Take 325 mg by mouth every 6 hours as needed for mild pain or fever        albuterol (PROAIR HFA/PROVENTIL HFA/VENTOLIN HFA) 108 (90 BASE) MCG/ACT Inhaler Inhale 2 puffs into the lungs every 6 hours as needed  for shortness of breath / dyspnea or wheezing 1 Inhaler 1     amLODIPine (NORVASC) 5 MG tablet Take 1 tablet (5 mg) by mouth 2 times daily 60 tablet 0     aspirin EC 81 MG EC tablet Take 1 tablet (81 mg) by mouth daily 30 tablet 0     atorvastatin (LIPITOR) 40 MG tablet Take 40 mg by mouth At Bedtime       B COMPLEX-C-FOLIC ACID PO Take 1 tablet by mouth At Bedtime        carvedilol (COREG) 25 MG tablet Take 1 tablet (25 mg) by mouth 2 times daily (with meals) 60 tablet 0     chlorhexidine (CHLORHEXIDINE) 0.12 % solution Rinse and gargle 15 ml by mouth twice a day as directed.       CLONAZEPAM PO Take 0.5 mg by mouth nightly as needed for anxiety (restless legs)       CLOPIDOGREL BISULFATE PO Take 75 mg by mouth every evening        dapsone 100 MG tablet Take 100 mg by mouth At Bedtime        Darunavir Ethanolate (PREZISTA PO) Take 800 mg by mouth At Bedtime.       dolutegravir (TIVICAY) 50 MG tablet Take 50 mg by mouth At Bedtime       DOXERCALCIFEROL IV Inject 6 mcg into the vein three times a week (with dialysis)       epoetin brittni (EPOGEN,PROCRIT) 48951 UNIT/ML injection Inject 11,000 Units Subcutaneous three times a week WITH DIALYSIS       gabapentin (NEURONTIN) 300 MG capsule Take 300 mg by mouth 2 times daily Pt takes 1 pill in the AM and 2 pill in the PM       guaiFENesin (MUCINEX) 600 MG 12 hr tablet Take 1,200 mg by mouth 2 times daily as needed        imiquimod (ALDARA) 5 % cream Apply topically as needed       insulin glargine (LANTUS PEN) 100 UNIT/ML pen Inject 15 Units Subcutaneous 2 times daily       insulin lispro (HUMALOG PEN) 100 UNIT/ML pen Inject 12 Units Subcutaneous 3 times daily (before meals)       ipratropium - albuterol 0.5 mg/2.5 mg/3 mL (DUONEB) 0.5-2.5 (3) MG/3ML neb solution Take 1 vial (3 mLs) by nebulization every 6 hours as needed for shortness of breath / dyspnea or wheezing 90 vial 1     irbesartan (AVAPRO) 300 MG tablet Take 1 tablet (300 mg) by mouth At Bedtime 30 tablet 0      "iron sucrose (VENOFER) 20 MG/ML injection Inject 50 mg into the vein once a week WIth dialysis       Isosorbide Mononitrate  MG TB24 Take 1 tablet (120 mg) by mouth every evening 30 tablet 11     ketoconazole (NIZORAL) 2 % cream Apply topically daily        ketorolac (ACULAR) 0.5 % ophthalmic solution INSTILL 1 GTT IN OPERATIVE EYE QID STARTING 3 DAYS PRIOR TO SURGERY. CONTINUE AFTER SURGERY FOR 2 WEEKS FOLLOWING  1     MAGNESIUM OXIDE PO Take 400 mg by mouth At Bedtime       melatonin 5 MG tablet Take 5 mg by mouth At Bedtime       mirtazapine (REMERON) 15 MG tablet Take 15 mg by mouth At Bedtime       moxifloxacin (VIGAMOX) 0.5 % ophthalmic solution 1 drop in operative eye 4x daily starting 3 days prior to surgery, then continue after surgery for 1 week following post op instructions       nitroglycerin (NITROSTAT) 0.4 MG SL tablet One tablet under the tongue every 5 minutes if needed for chest pain. May repeat every 5 minutes for a maximum of 3 doses in 15 minutes\" 25 tablet 3     Nutritional Supplements (NEPRO PO) 1-3 times daily       omega-3 acid ethyl esters (LOVAZA) 1 G capsule Take 2 g by mouth 2 times daily       pantoprazole (PROTONIX) 40 MG EC tablet Take 40 mg by mouth daily       polyethylene glycol (MIRALAX/GLYCOLAX) packet Take 1 packet by mouth daily as needed for constipation       prednisoLONE acetate (PRED FORTE) 1 % ophthalmic suspension Start after surgery: 1 drop in operative eye 4x daily for 1 wk, then 3x daily for 1 wk, then 2x daily for 1 wk, then 1x daily for 1 wk       ritonavir (NORVIR) 100 MG capsule Take 1 capsule by mouth At Bedtime        sucroferric oxyhydroxide (VELPHORO) 500 MG CHEW chewable tablet Take 500 mg by mouth 3 times daily (with meals)         ALLERGIES     Allergies   Allergen Reactions     Calcium Acetate Other (See Comments)     Other reaction(s): Other, see comments  Pain in chest and back  Pain in chest area (sensitive skin)      Contrast Dye Other (See " Comments)     Contrast nephropathy     Diagnostic X-Ray Materials Other (See Comments)     PN: renal failure     Lisinopril      Sulfa Drugs        PAST MEDICAL HISTORY:  Past Medical History:   Diagnosis Date     Allergic rhinitis      Anemia due to chronic kidney disease 10/21/2011     CAD S/P percutaneous coronary angioplasty 6/15/2015     Cataract      CKD (chronic kidney disease)      Colon cancer (H)      COPD (chronic obstructive pulmonary disease) (H)      Depression      Dilated aortic root (H) 5/6/2016     Diverticulitis      ESRD (end stage renal disease) on dialysis (H) 5/6/2016     Gout      Human immunodeficiency virus (HIV) disease (H)      Hx of squamous cell carcinoma 03/23/2007     Hypertension 2010     Impotence of organic origin      Increased prostate specific antigen (PSA) velocity 08/08/2016    Awaiting bx on blood thinner     Mixed hyperlipidemia      Pulmonary HTN (H)     Mod     TIA (transient ischemic attack) 5/2016     Type 2 diabetes mellitus (H) age 50       PAST SURGICAL HISTORY:  Past Surgical History:   Procedure Laterality Date     ANGIOGRAM  03-04-16    No culprit lesions, stents widely patent      ANGIOGRAM  05-06-16    Cutting balloon ptca=Diag     APPENDECTOMY  2000     CHOLECYSTECTOMY, LAPOROSCOPIC       COLON SURGERY       COLOSTOMY  09/30/1999    Temporary for diverticulitis     EP PACEMAKER N/A 5/2/2019    Procedure: EP Pacemaker;  Surgeon: Angelique Perera MD;  Location:  [facility]      LEFT HEART CATHETERIZATION  03/12/2018    No significant change  Elevated LVEDp  LVEF 30% No PCI     HEART CATH, ANGIOPLASTY  08-18-16    LAD PCI. Stented with a 3.0 x 8 mm Xience Alpine stent.     IR DIALYSIS FISTULOGRAM LEFT  1/25/2019     STENT, CORONARY, S660 15/18  12/2015    VANITA=Diag, PTCA=LAD     STENT, CORONARY, S660 15/18  06/2015    VANITA=LAD       FAMILY HISTORY:  Family History   Problem Relation Age of Onset     Heart Disease Brother 40        CABG      Kidney Disease Sister      Hypertension Sister      Heart Disease Brother         Dilated aorta       SOCIAL HISTORY:  Social History     Socioeconomic History     Marital status:      Spouse name: None     Number of children: None     Years of education: None     Highest education level: None   Occupational History     None   Social Needs     Financial resource strain: None     Food insecurity:     Worry: None     Inability: None     Transportation needs:     Medical: None     Non-medical: None   Tobacco Use     Smoking status: Former Smoker     Packs/day: 2.00     Years: 41.00     Pack years: 82.00     Types: Cigarettes     Last attempt to quit:      Years since quittin.6     Smokeless tobacco: Never Used   Substance and Sexual Activity     Alcohol use: No     Alcohol/week: 0.0 oz     Drug use: No     Sexual activity: None   Lifestyle     Physical activity:     Days per week: None     Minutes per session: None     Stress: None   Relationships     Social connections:     Talks on phone: None     Gets together: None     Attends Jew service: None     Active member of club or organization: None     Attends meetings of clubs or organizations: None     Relationship status: None     Intimate partner violence:     Fear of current or ex partner: None     Emotionally abused: None     Physically abused: None     Forced sexual activity: None   Other Topics Concern     Parent/sibling w/ CABG, MI or angioplasty before 65F 55M? Yes      Service Not Asked     Blood Transfusions Not Asked     Caffeine Concern Yes     Comment: 2 cups daily     Occupational Exposure Not Asked     Hobby Hazards Not Asked     Sleep Concern Yes     Stress Concern Not Asked     Weight Concern Not Asked     Special Diet No     Comment:  more proteins     Back Care Not Asked     Exercise Yes     Comment: Cardiac rehab      Bike Helmet Not Asked     Seat Belt Not Asked     Self-Exams Not Asked   Social History Narrative      "None       Review of Systems:  Skin:  Negative     Eyes:  Positive for glasses  ENT:  Negative    Respiratory:  Positive for dyspnea on exertion;shortness of breath  Cardiovascular:    chest pain;Positive for  Gastroenterology: not assessed    Genitourinary:  not assessed    Musculoskeletal:  not assessed    Neurologic:  not assessed    Psychiatric:  not assessed    Heme/Lymph/Imm:  not assessed    Endocrine:  not assessed      Physical Exam:  Vitals: BP (!) 143/73 (BP Location: Right arm, Patient Position: Sitting, Cuff Size: Adult Large)   Pulse 60   Ht 1.803 m (5' 11\")   Wt 92.1 kg (203 lb)   SpO2 96%   BMI 28.31 kg/m      Constitutional:  cooperative, alert and oriented, well developed, well nourished, in no acute distress   Pale, ashen    Skin:  warm and dry to the touch bruises present Multiple fistula on left arm    Head:  normocephalic        Eyes:  pupils equal and round        ENT:           Neck:  JVP normal bilateral carotid bruit      Chest:  clear to auscultation        Cardiac: regular rhythm       systolic murmur          Abdomen:  abdomen soft        Vascular: not assessed this visit                                      Extremities and Back:  no deformities, clubbing, cyanosis, erythema observed        Neurological:  no gross motor deficits          Recent Lab Results:  LIPID RESULTS:  Lab Results   Component Value Date    CHOL 127 09/02/2018    HDL 26 (L) 09/02/2018    LDL  09/02/2018     Cannot estimate LDL when triglyceride exceeds 400 mg/dL    LDL 48 09/02/2018    TRIG 447 (H) 09/02/2018    CHOLHDLRATIO 8.1 (H) 07/23/2012       LIVER ENZYME RESULTS:  Lab Results   Component Value Date    AST 18 07/26/2019    ALT 18 07/26/2019       CBC RESULTS:  Lab Results   Component Value Date    WBC 4.5 08/08/2019    RBC 2.83 (L) 08/08/2019    HGB 9.2 (L) 08/08/2019    HCT 28.7 (L) 08/08/2019     (H) 08/08/2019    MCH 32.5 08/08/2019    MCHC 32.1 08/08/2019    RDW 15.1 (H) 08/08/2019     " (L) 08/08/2019       BMP RESULTS:  Lab Results   Component Value Date     (L) 08/08/2019    POTASSIUM 4.2 08/08/2019    CHLORIDE 97 08/08/2019    CO2 26 08/08/2019    ANIONGAP 8 08/08/2019     (H) 08/08/2019    BUN 32 (H) 08/08/2019    CR 4.73 (H) 08/08/2019    GFRESTIMATED 11 (L) 08/08/2019    GFRESTBLACK 13 (L) 08/08/2019    PRABHA 9.4 08/08/2019        A1C RESULTS:  Lab Results   Component Value Date    A1C Canceled, Test credited 07/26/2019       INR RESULTS:  Lab Results   Component Value Date    INR 1.10 05/13/2019    INR 1.06 05/02/2019           CC  Claire Adorno, APRN CNP  [id number]  [place] [address]

## 2019-08-07 NOTE — LETTER
8/7/2019    Sukh Owen MD  [facility] [address] [place] [address]    RE: Donald Raza       Dear Colleague,    I had the pleasure of seeing Donald Raza in the [facility].    HPI:  Donald Raza is a 69 year old male who presents for cardiology appointment.  Unfortunately patient he was lost to follow-up and has been hospitalized frequently in the last year.  He is a patient of Dr. Diaz.  His  medical history includes     1. Coronary artery disease.  Multiple NSTEMI with diagonal and LAD stenting, repeat LAD/diagonal stenting due to in-stent restenosis (8/2016).    2. HFpEF. LVEDP of 40 (3/12/2018)  3. Severe symptomatic bradycardia s/p dual-chamber [employer] permanent pacemaker (5/2019)  4. Diabetes  5. HIV on therapy  6. Anemia  7. ESRD on dialysis  8. Hypertension.   Followed by nephrology    Diagnostics:    ECHO (5/2019) revealed EF 60-65%, mild ascending aorta    Device check (6/2019) revealed 21% a paced, 100% V paced, mode DDD 60-1 30 underlying rhythm sinus rhythm/sinus bradycardia 50-60 bpm, good heart rate variability, stable leads, no atrial or ventricle arrhythmias.    Nuclear Stress test (12/2018) revealed no ischemia with EF 59%    Coronary Catheretization (3/2018) revealed widely patent stents, normal FFR of diagonal and distal LAD.  50% diagonal stenosis and 50% stenosis in lateral branch of bifurcating diagonal.  50-70% stenosis in the small caliber medial branch.  30% per LV gram(no PCI)     Coronary Catheretization (10/2017) for chest discomfort showed a patent diagonal stent and a patent mid to distal LAD stent. The ostium of the diagonal artery had a 60-70% stenosis and was FFR negative. The distal left anterior descending artery is also FFR negative. The first diagonal does bifurcate with the more medial branch which is quite small has a 70% stenosis (No PCI)    He is a complex cardiovascular history.  He he suffered from  an MI and underwent stenting to his LAD in 2015.  In addition, he has stenting to the diagonal, repeat angioplasty to the LAD, multiple stenting to his diagonal due to in-stent restenosis last being 8/2016.  He was hospitalized March 2018 for chest pain and CHF exacerbation and underwent an angiogram with no significant changes, his LVEDP was elevated to 40, and his ejection fraction was found to be 30% (ECHO showed 60%) no PCI interventions were completed.  During the visit he also received 4 units of packed red blood cells and he was started on hydralazine and his losartan was changed to irbesartan.    He then saw Yuki Hinojosa 4/2018 where he was having chest pain while undergoing dialysis, took a nitroglycerin which relieved his pain and was to follow up with Dr. Diaz in 6 month and unfortunately has gotten lost to follow up and only seen in the hospital.      He was in the ED 7/2018 for generalized weakness due to anemia and received a transfusion of PRBC. He was hospitalized in 9/2018 for dialysis disequilibrium syndrome and CVA was ruled out.  In December 2018 & January 2019 x 2 he was hospitalzied for sepsis, respiratory distress, and chest pain respectfully.      In May 2019, he was hospitalized for shortness of breath, chest pain, and severe symptomatic bradycardia.  He subsequently underwent a dual-chamber permanent pacemaker.    Again in May 2019 he was hospitalized for chest pain associated with elevated blood pressure (210/140) and his hydralazine was stopped and he was started on amlodipine 10 mg    In June 2019, he saw GI MD at [facility] who stated I do not feel a need to repeat the esophagogastroduodenoscopy or colonoscopy at this time, especially without any evidence for luminal gastroenterologic blood loss. The outstanding question is whether or not this gentleman needs a capsule endoscopy to evaluate for occult GIB as the etiology of his anemia. I suspect his anemia is secondary to ESRD and decreased RBC  production, but I cannot rule out a small bowel source. Not currently iron deficient, but he receives IV venofer weekly and oral iron so not entirely reliable.    In July 2019 he was hospitalized again for chest pain and dysphagia.  His hemoglobin was found to be 6.3 his BNP had elevated and he had a mild increase in his troponin however the pain was thought to be noncardiac and conservative measures were recommended such as following with GI for EGD/motility study.    Considering he has a very complex history, he does not have any CV complaints.  He has shortness of breath intermittently for multiple reasons including COPD, anemia, and fluid overload.  For this COPD he uses his nebulizers, and he goes to dialysis 4x/week, and he notices when his hgb decreases to 7.5 gets SOB/FRANCO and presents for transfusions.   He denies palpitations.  Dizziness frequently occurs after dialysis.  He is able to sleep flat in bed with no problems.  Denies regular chest pain, pressure or angina symptoms.    Nephrology manages his Blood pressure.        ASSESSMENT AND PLAN    Coronary artery Disease    Suffered an MI and underwent stenting to his LAD in 2015.     Has stenting to the diagonal, repeat angioplasty to the LAD, multiple stenting to his diagonal due to in-stent restenosis last being 8/2016    Has had 2 coronary catheretization since 10/2017 and 3/2018 with no interventions.  However the 10/2017 report reveals  50% diagonal stenosis and 50% stenosis in lateral branch of bifurcating diagonal.  50-70% stenosis in the small caliber medial branch.    He is currently taking aspirin 81 mg, atorvastatin 40 mg daily, coreg 25 mg twice daily and Plavix.      Per Dr. Diaz note (1/2018),  At this time, I recommend continuing current cardiac medications of dual antiplatelet therapy, high-intensity statin and it interstitial blocker, long-acting nitrate, calcium channel blocker. He had issues with anemia but this does not look like a GI  blood loss tissue, likely due to end-stage renal disease. However, in future if there is any concern about GI bleeding, Plavix can be discontinued and he can only use aspirin for antiplatelet therapy    He does have intermittent chest pain which he often associates with anemia    Due to history of anemia with multiple need for PRBC will stop Plavix and continue ASA 81 mg daily.        Severe symptomatic bradycardia s/p dual-chamber [employer] permanent pacemaker (5/2019)    Stable leads    Normal device function     Follow up with device clinic on a quarterly basis     HFpEF.     LVEDP of 40 (3/12/2018)    ECHO (5/2019) revealed EF 60-65%, mild ascending aorta    Receives dialysis 3 days per week for maintenance of volume    Hypertension    On Multiple medications and Managed by Nephrology.     Plan:     stop Plavix and continue ASA 81 mg daily    Follow up with Dr. Diaz in 6 months    Follow with device clinic every quarter    Patient expresses understanding and agreement with the plan.     I appreciate the chance to help with Donald Raza Please let me know if you have any questions or concerns.    Claire Adorno, APRN, CNP    This note was completed in part using Dragon voice recognition software. Although reviewed after completion, some word and grammatical errors may occur.    Orders Placed This Encounter   Procedures     Follow-Up with Cardiologist     No orders of the defined types were placed in this encounter.    Medications Discontinued During This Encounter   Medication Reason     gabapentin (NEURONTIN) 300 MG capsule Medication Reconciliation Clean Up         Encounter Diagnoses   Name Primary?     Cardiac pacemaker in situ      Coronary artery disease involving native coronary artery of native heart without angina pectoris Yes       CURRENT MEDICATIONS:  Current Outpatient Medications   Medication Sig Dispense Refill     abacavir (ZIAGEN) 300 MG tablet Take 600 mg by mouth every evening         Acetaminophen (TYLENOL PO) Take 325 mg by mouth every 6 hours as needed for mild pain or fever        albuterol (PROAIR HFA/PROVENTIL HFA/VENTOLIN HFA) 108 (90 BASE) MCG/ACT Inhaler Inhale 2 puffs into the lungs every 6 hours as needed for shortness of breath / dyspnea or wheezing 1 Inhaler 1     amLODIPine (NORVASC) 5 MG tablet Take 1 tablet (5 mg) by mouth 2 times daily 60 tablet 0     aspirin EC 81 MG EC tablet Take 1 tablet (81 mg) by mouth daily 30 tablet 0     atorvastatin (LIPITOR) 40 MG tablet Take 40 mg by mouth At Bedtime       B COMPLEX-C-FOLIC ACID PO Take 1 tablet by mouth At Bedtime        carvedilol (COREG) 25 MG tablet Take 1 tablet (25 mg) by mouth 2 times daily (with meals) 60 tablet 0     chlorhexidine (CHLORHEXIDINE) 0.12 % solution Rinse and gargle 15 ml by mouth twice a day as directed.       CLONAZEPAM PO Take 0.5 mg by mouth nightly as needed for anxiety (restless legs)       CLOPIDOGREL BISULFATE PO Take 75 mg by mouth every evening        dapsone 100 MG tablet Take 100 mg by mouth At Bedtime        Darunavir Ethanolate (PREZISTA PO) Take 800 mg by mouth At Bedtime.       dolutegravir (TIVICAY) 50 MG tablet Take 50 mg by mouth At Bedtime       DOXERCALCIFEROL IV Inject 6 mcg into the vein three times a week (with dialysis)       epoetin brittni (EPOGEN,PROCRIT) 25639 UNIT/ML injection Inject 11,000 Units Subcutaneous three times a week WITH DIALYSIS       gabapentin (NEURONTIN) 300 MG capsule Take 300 mg by mouth 2 times daily Pt takes 1 pill in the AM and 2 pill in the PM       guaiFENesin (MUCINEX) 600 MG 12 hr tablet Take 1,200 mg by mouth 2 times daily as needed        imiquimod (ALDARA) 5 % cream Apply topically as needed       insulin glargine (LANTUS PEN) 100 UNIT/ML pen Inject 15 Units Subcutaneous 2 times daily       insulin lispro (HUMALOG PEN) 100 UNIT/ML pen Inject 12 Units Subcutaneous 3 times daily (before meals)       ipratropium - albuterol 0.5 mg/2.5 mg/3 mL (DUONEB) 0.5-2.5  "(3) MG/3ML neb solution Take 1 vial (3 mLs) by nebulization every 6 hours as needed for shortness of breath / dyspnea or wheezing 90 vial 1     irbesartan (AVAPRO) 300 MG tablet Take 1 tablet (300 mg) by mouth At Bedtime 30 tablet 0     iron sucrose (VENOFER) 20 MG/ML injection Inject 50 mg into the vein once a week WIth dialysis       Isosorbide Mononitrate  MG TB24 Take 1 tablet (120 mg) by mouth every evening 30 tablet 11     ketoconazole (NIZORAL) 2 % cream Apply topically daily        ketorolac (ACULAR) 0.5 % ophthalmic solution INSTILL 1 GTT IN OPERATIVE EYE QID STARTING 3 DAYS PRIOR TO SURGERY. CONTINUE AFTER SURGERY FOR 2 WEEKS FOLLOWING  1     MAGNESIUM OXIDE PO Take 400 mg by mouth At Bedtime       melatonin 5 MG tablet Take 5 mg by mouth At Bedtime       mirtazapine (REMERON) 15 MG tablet Take 15 mg by mouth At Bedtime       moxifloxacin (VIGAMOX) 0.5 % ophthalmic solution 1 drop in operative eye 4x daily starting 3 days prior to surgery, then continue after surgery for 1 week following post op instructions       nitroglycerin (NITROSTAT) 0.4 MG SL tablet One tablet under the tongue every 5 minutes if needed for chest pain. May repeat every 5 minutes for a maximum of 3 doses in 15 minutes\" 25 tablet 3     Nutritional Supplements (NEPRO PO) 1-3 times daily       omega-3 acid ethyl esters (LOVAZA) 1 G capsule Take 2 g by mouth 2 times daily       pantoprazole (PROTONIX) 40 MG EC tablet Take 40 mg by mouth daily       polyethylene glycol (MIRALAX/GLYCOLAX) packet Take 1 packet by mouth daily as needed for constipation       prednisoLONE acetate (PRED FORTE) 1 % ophthalmic suspension Start after surgery: 1 drop in operative eye 4x daily for 1 wk, then 3x daily for 1 wk, then 2x daily for 1 wk, then 1x daily for 1 wk       ritonavir (NORVIR) 100 MG capsule Take 1 capsule by mouth At Bedtime        sucroferric oxyhydroxide (VELPHORO) 500 MG CHEW chewable tablet Take 500 mg by mouth 3 times daily (with " meals)         ALLERGIES     Allergies   Allergen Reactions     Calcium Acetate Other (See Comments)     Other reaction(s): Other, see comments  Pain in chest and back  Pain in chest area (sensitive skin)      Contrast Dye Other (See Comments)     Contrast nephropathy     Diagnostic X-Ray Materials Other (See Comments)     PN: renal failure     Lisinopril      Sulfa Drugs        PAST MEDICAL HISTORY:  Past Medical History:   Diagnosis Date     Allergic rhinitis      Anemia due to chronic kidney disease 10/21/2011     CAD S/P percutaneous coronary angioplasty 6/15/2015     Cataract      CKD (chronic kidney disease)      Colon cancer (H)      COPD (chronic obstructive pulmonary disease) (H)      Depression      Dilated aortic root (H) 5/6/2016     Diverticulitis      ESRD (end stage renal disease) on dialysis (H) 5/6/2016     Gout      Human immunodeficiency virus (HIV) disease (H)      Hx of squamous cell carcinoma 03/23/2007     Hypertension 2010     Impotence of organic origin      Increased prostate specific antigen (PSA) velocity 08/08/2016    Awaiting bx on blood thinner     Mixed hyperlipidemia      Pulmonary HTN (H)     Mod     TIA (transient ischemic attack) 5/2016     Type 2 diabetes mellitus (H) age 50       PAST SURGICAL HISTORY:  Past Surgical History:   Procedure Laterality Date     ANGIOGRAM  03-04-16    No culprit lesions, stents widely patent      ANGIOGRAM  05-06-16    Cutting balloon ptca=Diag     APPENDECTOMY  2000     CHOLECYSTECTOMY, LAPOROSCOPIC       COLON SURGERY       COLOSTOMY  09/30/1999    Temporary for diverticulitis     EP PACEMAKER N/A 5/2/2019    Procedure: EP Pacemaker;  Surgeon: Angelique Perera MD;  Location:  HEART CARDIAC CATH LAB     HC LEFT HEART CATHETERIZATION  03/12/2018    No significant change  Elevated LVEDp  LVEF 30% No PCI     HEART CATH, ANGIOPLASTY  08-18-16    LAD PCI. Stented with a 3.0 x 8 mm Xience Alpine stent.     IR DIALYSIS FISTULOGRAM LEFT   2019     STENT, CORONARY, S660 15/18  2015    VANITA=Diag, PTCA=LAD     STENT, CORONARY, S660 15/18  2015    VANITA=LAD       FAMILY HISTORY:  Family History   Problem Relation Age of Onset     Heart Disease Brother 40        CABG     Kidney Disease Sister      Hypertension Sister      Heart Disease Brother         Dilated aorta       SOCIAL HISTORY:  Social History     Socioeconomic History     Marital status:      Spouse name: None     Number of children: None     Years of education: None     Highest education level: None   Occupational History     None   Social Needs     Financial resource strain: None     Food insecurity:     Worry: None     Inability: None     Transportation needs:     Medical: None     Non-medical: None   Tobacco Use     Smoking status: Former Smoker     Packs/day: 2.00     Years: 41.00     Pack years: 82.00     Types: Cigarettes     Last attempt to quit:      Years since quittin.6     Smokeless tobacco: Never Used   Substance and Sexual Activity     Alcohol use: No     Alcohol/week: 0.0 oz     Drug use: No     Sexual activity: None   Lifestyle     Physical activity:     Days per week: None     Minutes per session: None     Stress: None   Relationships     Social connections:     Talks on phone: None     Gets together: None     Attends Yarsani service: None     Active member of club or organization: None     Attends meetings of clubs or organizations: None     Relationship status: None     Intimate partner violence:     Fear of current or ex partner: None     Emotionally abused: None     Physically abused: None     Forced sexual activity: None   Other Topics Concern     Parent/sibling w/ CABG, MI or angioplasty before 65F 55M? Yes      Service Not Asked     Blood Transfusions Not Asked     Caffeine Concern Yes     Comment: 2 cups daily     Occupational Exposure Not Asked     Hobby Hazards Not Asked     Sleep Concern Yes     Stress Concern Not Asked     Weight  "Concern Not Asked     Special Diet No     Comment:  more proteins     Back Care Not Asked     Exercise Yes     Comment: Cardiac rehab      Bike Helmet Not Asked     Seat Belt Not Asked     Self-Exams Not Asked   Social History Narrative     None       Review of Systems:  Skin:  Negative     Eyes:  Positive for glasses  ENT:  Negative    Respiratory:  Positive for dyspnea on exertion;shortness of breath  Cardiovascular:    chest pain;Positive for  Gastroenterology: not assessed    Genitourinary:  not assessed    Musculoskeletal:  not assessed    Neurologic:  not assessed    Psychiatric:  not assessed    Heme/Lymph/Imm:  not assessed    Endocrine:  not assessed      Physical Exam:  Vitals: BP (!) 143/73 (BP Location: Right arm, Patient Position: Sitting, Cuff Size: Adult Large)   Pulse 60   Ht 1.803 m (5' 11\")   Wt 92.1 kg (203 lb)   SpO2 96%   BMI 28.31 kg/m       Constitutional:  cooperative, alert and oriented, well developed, well nourished, in no acute distress   Pale, ashen    Skin:  warm and dry to the touch bruises present Multiple fistula on left arm    Head:  normocephalic        Eyes:  pupils equal and round        ENT:           Neck:  JVP normal bilateral carotid bruit      Chest:  clear to auscultation        Cardiac: regular rhythm       systolic murmur          Abdomen:  abdomen soft        Vascular: not assessed this visit                                      Extremities and Back:  no deformities, clubbing, cyanosis, erythema observed        Neurological:  no gross motor deficits          Recent Lab Results:  LIPID RESULTS:  Lab Results   Component Value Date    CHOL 127 09/02/2018    HDL 26 (L) 09/02/2018    LDL  09/02/2018     Cannot estimate LDL when triglyceride exceeds 400 mg/dL    LDL 48 09/02/2018    TRIG 447 (H) 09/02/2018    CHOLHDLRATIO 8.1 (H) 07/23/2012       LIVER ENZYME RESULTS:  Lab Results   Component Value Date    AST 18 07/26/2019    ALT 18 07/26/2019       CBC RESULTS:  Lab " Results   Component Value Date    WBC 4.5 08/08/2019    RBC 2.83 (L) 08/08/2019    HGB 9.2 (L) 08/08/2019    HCT 28.7 (L) 08/08/2019     (H) 08/08/2019    MCH 32.5 08/08/2019    MCHC 32.1 08/08/2019    RDW 15.1 (H) 08/08/2019     (L) 08/08/2019       BMP RESULTS:  Lab Results   Component Value Date     (L) 08/08/2019    POTASSIUM 4.2 08/08/2019    CHLORIDE 97 08/08/2019    CO2 26 08/08/2019    ANIONGAP 8 08/08/2019     (H) 08/08/2019    BUN 32 (H) 08/08/2019    CR 4.73 (H) 08/08/2019    GFRESTIMATED 11 (L) 08/08/2019    GFRESTBLACK 13 (L) 08/08/2019    PRABHA 9.4 08/08/2019        A1C RESULTS:  Lab Results   Component Value Date    A1C Canceled, Test credited 07/26/2019       INR RESULTS:  Lab Results   Component Value Date    INR 1.10 05/13/2019    INR 1.06 05/02/2019           CC  Claire Adorno, DEDRICK CNP  [address]  [place] [address]              Thank you for allowing me to participate in the care of your patient.    Sincerely,     DEDRICK Recinos CNP     [facility]

## 2019-08-08 NOTE — ED NOTES
Bed: ED12  Expected date: 8/8/19  Expected time: 9:23 AM  Means of arrival: Ambulance  Comments:  A592; 69 M-chest pain

## 2019-08-08 NOTE — ED AVS SNAPSHOT
[facility] Emergency Department  [address]  [facility] [address]  Phone:  [phone]  Fax:  [phone]                                    Donald Raza   MRN: [id number]    Department:  [facility] Emergency Department   Date of Visit:  8/8/2019           After Visit Summary Signature Page    I have received my discharge instructions, and my questions have been answered. I have discussed any challenges I see with this plan with the nurse or doctor.    ..........................................................................................................................................  Patient/Patient Representative Signature      ..........................................................................................................................................  Patient Representative Print Name and Relationship to Patient    ..................................................               ................................................  Date                                   Time    ..........................................................................................................................................  Reviewed by Signature/Title    ...................................................              ..............................................  Date                                               Time          22EPIC Rev 08/18

## 2019-08-08 NOTE — DISCHARGE INSTRUCTIONS
Your treatment plan includes:    1) calling your PCP for followup in the next 2-3 days.    2) come back if you get worse    Reasons to return: chest pain, shortness of breath, nausea/vomiting, bleeding, confusion, blood in stools, dizziness, passing out, increasing headache, weakness, inability to walk.  Also return if increasing cough, difficulty breathing, nausea/vomiting, confusion, increasing abdominal pain.

## 2019-08-08 NOTE — ED PROVIDER NOTES
NM Lung Scan Perfusion Particulate (Final result)   Result time 08/08/19 14:21:33   Procedure changed from Lung vent and perf (NM)   Final result by Jose Bang MD (08/08/19 14:21:33)                Impression:    IMPRESSION: Normal lung perfusion scan.    JOSE BANG MD            Narrative:    NUCLEAR MEDICINE LUNG SCAN PERFUSION PARTICULATE  8/8/2019 2:03 PM     HISTORY: PE suspected, intermediate probability, positive D-dimer.    TECHNIQUE: Patient was administered 3.3 mCi Tc 99m MAA intravenously  prior to performing the perfusion images.    FINDINGS: No peripheral wedge shaped defects are identified to suggest  pulmonary emboli. Therefore, no ventilation images were performed.                    3:05 PM Patient updated, plan to up and ambulate     Carmella Schneider MD  08/08/19 1320

## 2019-08-08 NOTE — ED TRIAGE NOTES
Pt aox4, abcs intact. Pt arrives via EMS for evaluation of sudden onset of chest pain after his dialysis run was complete and they were pulling the needles out. Pain was initially 9/10 but has gradually gone down as time has passed. Pt given tylenol at dialysis for a headache from his BP changes during the run. Pt given 324 of Asprin via EMS. Pain 2/10. Pt states that he was recently seen for a blood transfusion and is currently feeling weak and tired, pt appears pale during triage.

## 2019-08-08 NOTE — ED PROVIDER NOTES
"  History     Chief Complaint:    Chest Pain      HPI   Donald Raza is a 69 year old male on Baby Asiprin with history of CAD who presents to the emergency department today with chest pain. The patient states that after his dialysis run today, when they took out the needles, he had a sharp,\"knife-like\" constant pain to his chest. He states that in the ED the pain is not as bad currently as it was after dialysis. He states he has had a previous chest pain that was more \"crushing\" in nature, and not similar to today's pain. He marta shortness of breath, leg swelling, fever, vomiting or cough. He denies a history of blood clots.     Allergies:  Calcium Acetate  Contrast Dye  Diagnostic X-Ray Materials  Lisinopril  Sulfa Drugs     Medications:    Abacavir (Ziagen)  Amlodipine (Norvasc)   Aspirin Ec 81 Mg   Atorvastatin (Lipitor)  Carvedilol (Coreg)   Clonazepam   Clopidogrel Bisulfate   Dapsone  Darunavir Ethanolate (Prezista Po)  Dolutegravir (Tivicay)  Doxercalciferol  Epoetin Dwayne (Epogen,Procrit)   Gabapentin (Neurontin)   Guaifenesin (Mucinex  Insulin Glargine  Insulin Lispro   Ipratropium - Albuterol  Irbesartan (Avapro)  Isosorbide Mononitrate   Ketorolac (Acular)  Magnesium Oxide   Melatonin   Mirtazapine (Remeron)  Nitroglycerin (Nitrostat)   Omega-3 Acid Ethyl Esters (Lovaza)  Pantoprazole (Protonix)   Polyethylene Glycol (Miralax/Glycolax)   Prednisolone Acetate (Pred Forte)   Ritonavir (Norvir)   Sucroferric Oxyhydroxide (Velphoro)     Past Medical History:    Bilateral pneumonia  Hypertensive emergency  Cardiac pacemaker in situ  Angina at rest  Chest pain  Dialysis patient   COPD exacerbation   COPD (chronic obstructive pulmonary disease)   Sepsis   Cerebral hypoperfusion, transient and thought primarily to low volume/BP assoc with dialysis  Expressive aphasia  Generalized muscle weakness  TIA (transient ischemic attack)  Generalized weakness  Acute pulmonary edema   Respiratory failure "   Pneumonia  Acute respiratory failure with hypoxia  Bronchitis  Unstable angina pectoris   Dialysis AV fistula malfunction  Diarrhea  Anemia  AIDS (acquired immune deficiency syndrome)   Bradycardia  ESRD (end stage renal disease) on dialysis   Pulmonary hypertension   Dilated aortic root   Unstable angina   Coronary artery disease  Acute chest pain  Organ transplant candidate  Type 2 diabetes mellitus   Human immunodeficiency virus (HIV) disease (HCC)  Allergic rhinitis  Impotence of organic origin  Renal insufficiency  Hypertension  Mixed hyperlipidemia  Anemia due to chronic kidney disease  Huang disease    Past Surgical History:    Angiogram  Appendectomy  Cholecystectomy, Laporoscopic  Colon Surgery  Colostomy  Ep Pacemaker  Hc Left Heart Catheterization  Heart Cath, Angioplasty  Ir Dialysis Fistulogram Left  Stent, Coronary    Family History:    Brother - heart disease  Sister - kidney disease, hypertension     Social History:  The patient was alone.  Smoking Status: Former  Smokeless Tobacco: never  Alcohol Use: No    Marital Status:       Review of Systems   Constitutional: Negative for fever.   Respiratory: Negative for cough and shortness of breath.    Cardiovascular: Positive for chest pain. Negative for leg swelling.   Gastrointestinal: Positive for nausea. Negative for vomiting.   Musculoskeletal: Positive for neck pain.   Neurological: Positive for headaches.   All other systems reviewed and are negative.      Physical Exam   First Vitals:  BP: (!) 167/89  Pulse: 61  Heart Rate: 61  Temp: 98.5  F (36.9  C)  Resp: 15  SpO2: 99 %      Physical Exam  General: Elderly male sitting upright  Eyes: PERRL, Conjunctive pale pink  ENT: Moist mucous membranes, oropharynx clear.   CV: Normal S1S2, no murmur, rub or gallop. Regular rate and rhythm. Palpable fistula LUE.  Resp: Clear to auscultation bilaterally, no wheezes, rales or rhonchi. Normal respiratory effort.  GI: Abdomen is soft, nontender and  nondistended. No palpable masses. No rebound or guarding.  MSK: No edema. Nontender. Normal active range of motion. No calf asymmetry.  Skin: Warm and dry. No petechiae. No visible ecchymoses. No rash.  Neuro: Alert and oriented. Responds appropriately to all questions and commands. No focal findings appreciated. Normal muscle tone.  Psych: Normal mood and affect. Pleasant.      Emergency Department Course   ECG:  Indication: Chest pain  Time: 0931  Vent. Rate 60 bpm. PA interval 234. QRS duration 90. QT/QTc 424/424. P-R-T axis 12 6 110.  Suspect unspecified pacemaker failure. Sinus rhythm with 1st degree AV block. Inferior infarct, age undetermined. Abnormal ECG.   Read time: 0933    Imaging:  Radiographic findings were communicated with the patient who voiced understanding of the findings.    XR Chest PA and LAT:   IMPRESSION: PA and lateral views of the chest. Lungs are clear. Heart  is normal in size. No effusions are evident. No pneumothorax.  Implantable cardiac device right upper chest demonstrates leads  overlying the right atrium and right ventricle. No pneumothorax or  pleural effusions.  Final read as per radiology.     Lung vent and perf (NM):  Pending    Laboratory:  D-dimer: 1.1  1006 Troponin: <0.015  BMP: Glucose: 225 (H), BUN: 32 (H), NA: 131 (L), Creatinine: 4.73 (H), GFR: 11 (L), o/w WNL   CBC: WBC: 4.5, HGB: 9.2, PLT: 117  1307 Troponin: <0.015    Interventions:  0943  mg PO  1336 Tc99m MAA 3.3 millicurie IV    Emergency Department Course:  Nursing notes and vitals reviewed. (0933) I performed an exam of the patient as documented above.     IV inserted. Medicine administered as documented above. Blood drawn. This was sent to the lab for further testing, results above.     The patient was sent for a chest XR while in the emergency department, findings above.     1250 I rechecked the patient and discussed the results of his work up so far. He denies any new concerns.     Patient will be sent  to V/Q scan when ready. Signed out to Dr. Schneider waiting results of this study.       Impression & Plan        Medical Decision Making:  Donald Raza is a 69 year old male with chronic kidney disease and was dialyzed this morning with onset of chest pain as they were removing his dialysis needle. The pain is different than pain her has had in the past. It was nearly gone on my assessment and resolved entirely over his stay. He was not hypoxic or tachycardic. His blood pressure was elevated with a history of high blood pressure. His laboratory evaluation was remarkable for anemia which is not new for him. Hyponatremia also not new as is slightly elevated glucose that was secondary to diabetes. D-dimer was elevated, now this may be secondary to underlying chronic kidney disease. It seemed important to exclude pulmonary embolism as a cause for his sharp chest pain. On reassessment, continued to deny any pain but was feeling weak. His awaiting PE scan on this dictation will be signed out to colleague, Dr. Schneider, for follow-up with the study and reassessment to determine disposition. His chest XR here do not show any evidence of pneumonia, pneumothorax, or perfusions. Initial troponin was negative in the setting of atypical chest pain, unlikely to represent ACS.      Diagnosis:    ICD-10-CM    1. Chest pain, unspecified type R07.9    2. ESRD (end stage renal disease) on dialysis (H) N18.6     Z99.2        Disposition:  Signed out to Dr. Schneider in stable condition.        Scribe Disclosure:  I, Zev Wills, am serving as a scribe at 1:54 PM on 8/8/2019 to document services personally performed by Jill Castro MD based on my observations and the provider's statements to me.     8/8/2019   [facility] EMERGENCY DEPARTMENT       Jill Castro MD  08/27/19 2351

## 2019-08-13 NOTE — TELEPHONE ENCOUNTER
Spoke to patient in follow up to OV on 8/7.   Instructed patient to stop plavix and continue asa 81mg po every day.  Patent provided verbal understanding.  Medication list updated in epic.  HERRERA Hercules

## 2019-08-13 NOTE — TELEPHONE ENCOUNTER
Can you call pt and have him stop his Plavix and continue his aspirin:        Per Dr. Diaz note (1/2018),  At this time, I recommend continuing current cardiac medications of dual antiplatelet therapy, high-intensity statin and it interstitial blocker, long-acting nitrate, calcium channel blocker. He had issues with anemia but this does not look like a GI blood loss tissue, likely due to end-stage renal disease. However, in future if there is any concern about GI bleeding, Plavix can be discontinued and he can only use aspirin for antiplatelet therapy      Thank you,    Claire

## 2019-08-15 NOTE — TELEPHONE ENCOUNTER
Charmaine from [facility] called she states someone called patient about switching Plavix to a different medication.  Reviewed chart from 8-13-19 Claire Adorno recommended stopping plavix and change to 81mg ASA.  Charmaine will confirm with patient.

## 2019-09-24 NOTE — PROGRESS NOTES
OBSERVATION patient IN TIME: 1106    ROOM # 227    Living Situation (if not independent, order SW consult): Home with wife and brother  Facility name:Home  : Brother - Rodolfo    Activity level at baseline: Ind with cane  Activity level on admit: SBA with can/walker       Patient registered to observation; given Patient Bill of Rights; given the opportunity to ask questions about observation status and their plan of care.  Patient has been oriented to the observation room, bathroom and call light is in place.    Discussed discharge goals and expectations with patient/family.

## 2019-09-24 NOTE — ED NOTES
[facility]  ED Nurse Handoff Report    Donald Raza is a 69 year old male   ED Chief complaint: Chest Pain  . ED Diagnosis:   Final diagnoses:   Chest pain, unspecified type   ESRD (end stage renal disease) on dialysis (H)   Uncontrolled hypertension     Allergies:   Allergies   Allergen Reactions     Calcium Acetate Other (See Comments)     Other reaction(s): Other, see comments  Pain in chest and back  Pain in chest area (sensitive skin)      Contrast Dye Other (See Comments)     Contrast nephropathy     Diagnostic X-Ray Materials Other (See Comments)     PN: renal failure     Lisinopril      Sulfa Drugs        Code Status: Prior  Activity level - Baseline/Home:  Independent. Activity Level - Current:   Stand by Assist. Lift room needed: No. Bariatric: No   Needed: No   Isolation: No. Infection: Not Applicable.     Vital Signs:   Vitals:    09/24/19 0715 09/24/19 0800   BP: (!) 193/88 (!) 192/95   Pulse:  64   Resp: 18 16   Temp: 97.9  F (36.6  C)    TempSrc: Oral    SpO2: 94% 94%       Cardiac Rhythm:  ,      Pain level: 0-10 Pain Scale: 5(CP)  Patient confused: No. Patient Falls Risk: Yes.   Elimination Status: Dialysis pt  Patient Report - Initial Complaint:Donald Raza is a 69 year old male with a history of coronary artery disease s/p stent placement x3, unstable angina, HIV disease, ESRD on hemodialysis M-T-Th-Sat with a left upper extremity fistula, type II diabetes mellitus, COPD, s/p cardiac pacemaker placement who presents with chest pain. Patient is supposed to have dialysis four days a week--he did not have it yesterday, but was able to go have it today. However, when he got there he was significantly hypertensive. At the same time, he realized that he had chest pain, about 5/10. There was no radiation into his back or arm. His shortness of breath was not different, either, and he was not diaphoretic. Donald was given 4 Nitroglycerin in the ambulance, alongside 4x81mg ASA.  "Blood glucose for EMS was 141. Currently, his chest pain is rated 1-2/10 and localized in his left chest. He describes the pain as \"sharp.\" The pain is \"sometimes\" similar to what he had when he had stents placed, but overall the pain was much sharper then. Denies recent cough, fever, or illness. He has no new leg swelling. Donald has not missed any of his medications recently. . Focused Assessment: A&O. Uses cane to ambulate. LUE limb alert with fistula. Dialysis Mon, Tues, Thurs, Sat. Did not receive since Saturday. CP midsternal, non radiating.   Tests Performed: ekg, labs, cxr. Abnormal Results:   Labs Ordered and Resulted from Time of ED Arrival Up to the Time of Departure from the ED   GLUCOSE BY METER - Abnormal; Notable for the following components:       Result Value    Glucose 147 (*)     All other components within normal limits   BASIC METABOLIC PANEL - Abnormal; Notable for the following components:    Glucose 149 (*)     Urea Nitrogen 96 (*)     Creatinine 12.10 (*)     GFR Estimate 4 (*)     GFR Estimate If Black 4 (*)     All other components within normal limits   CBC WITH PLATELETS DIFFERENTIAL - Abnormal; Notable for the following components:    RBC Count 3.25 (*)     Hemoglobin 9.7 (*)     Hematocrit 31.2 (*)     MCHC 31.1 (*)     Platelet Count 126 (*)     All other components within normal limits   TROPONIN I   PULSE OXIMETRY NURSING   CARDIAC CONTINUOUS MONITORING   PERIPHERAL IV CATHETER   PATIENT CARE ORDER   VITAL SIGNS   .   Treatments provided:   Family Comments: NA  OBS brochure/video discussed/provided to patient:  Yes  ED Medications: Medications - No data to display  Drips infusing:  No  For the majority of the shift, the patient's behavior Green. Interventions performed were .     Severe Sepsis OR Septic Shock Diagnosis Present: No      ED Nurse Name/Phone Number: Vandana Morrison RN,   9:43 AM    RECEIVING UNIT ED HANDOFF REVIEW    Above ED Nurse Handoff Report was reviewed: " Yes  Reviewed by: Lawrence Bingham RN on September 24, 2019 at 10:24 AM

## 2019-09-24 NOTE — PHARMACY-ADMISSION MEDICATION HISTORY
Admission medication history interview status for this patient is complete. See [facility] admission navigator for allergy information, prior to admission medications and immunization status.     Medication history interview source(s):Patient  Medication history resources (including written lists, pill bottles, clinic record): med list from [facility]     Changes made to PTA medication list:  Added: artificial tears eye drops, entered insulin lispro sliding scale  Deleted: ketorolac and moxifloxacin eye drops  Changed: gabapentin from 1 cap AM and 2 caps afternoon to 1 cap bid and 1 capsule (300 mg) prn after HD; lantus from 15 units bid to 18 units bid, lispro from 12 units tid to 14 units tid.   For patients on insulin therapy: Y   Lantus/levemir/NPH/Mix 70/30 dose: yes   (see Med list for doses)   Sliding scale Novolog: yes  If Yes, do you have a baseline novolog pre-meal dose: yes  Patients eat three meals a day:   Y   How many episodes of hypoglycemia do you have per week: patient reports hypoglycemic episodes with increased insulin doses  How many missed doses do you have per week: - not asked  How many times do you check your blood glucose per day: - not asked, patient is too dizzy and weak to discuss     Any Barriers to therapy - Be specific :  cost of medications, comfortable with giving injections (if applicable), comfortable and confident with current diabetes regimen: No    Actions taken by pharmacist (provider contacted, etc):None     Additional medication history information:None    Medication reconciliation/reorder completed by provider prior to medication history? No    Prior to Admission medications    Medication Sig Last Dose Taking? Auth Provider   abacavir (ZIAGEN) 300 MG tablet Take 600 mg by mouth every evening  9/23/2019 Yes Abstract, Provider   Acetaminophen (TYLENOL PO) Take 325 mg by mouth every 6 hours as needed for mild pain or fever   Yes Unknown, Entered By History   albuterol (PROAIR  HFA/PROVENTIL HFA/VENTOLIN HFA) 108 (90 BASE) MCG/ACT Inhaler Inhale 2 puffs into the lungs every 6 hours as needed for shortness of breath / dyspnea or wheezing  Yes Nelson Worthy MD   amLODIPine (NORVASC) 5 MG tablet Take 1 tablet (5 mg) by mouth 2 times daily 9/23/2019 Yes Danny Azar MD   aspirin EC 81 MG EC tablet Take 1 tablet (81 mg) by mouth daily 9/23/2019 Yes Clemencia Pal MD   atorvastatin (LIPITOR) 40 MG tablet Take 40 mg by mouth At Bedtime 9/23/2019 Yes Unknown, Entered By History   B COMPLEX-C-FOLIC ACID PO Take 1 tablet by mouth At Bedtime  9/23/2019 Yes Reported, Patient   carvedilol (COREG) 25 MG tablet Take 1 tablet (25 mg) by mouth 2 times daily (with meals) 9/23/2019 Yes Danny Azar MD   chlorhexidine (CHLORHEXIDINE) 0.12 % solution Rinse and gargle 15 ml by mouth twice a day as directed.  Yes Abstract, Provider   CLONAZEPAM PO Take 0.5 mg by mouth nightly as needed for anxiety (restless legs)  Yes Unknown, Entered By History   dapsone 100 MG tablet Take 100 mg by mouth At Bedtime  9/23/2019 Yes Reported, Patient   Darunavir Ethanolate (PREZISTA PO) Take 800 mg by mouth At Bedtime. 9/23/2019 Yes Reported, Patient   dolutegravir (TIVICAY) 50 MG tablet Take 50 mg by mouth At Bedtime 9/23/2019 Yes Unknown, Entered By History   gabapentin (NEURONTIN) 300 MG capsule Take 300 mg by mouth as needed May take after HD.  Yes Unknown, Entered By History   gabapentin (NEURONTIN) 300 MG capsule Take 300 mg by mouth 2 times daily  9/23/2019 Yes Unknown, Entered By History   guaiFENesin (MUCINEX) 600 MG 12 hr tablet Take 1,200 mg by mouth 2 times daily as needed   Yes Unknown, Entered By History   hypromellose-dextran (ARTIFICAL TEARS) 0.1-0.3 % ophthalmic solution 1 drop daily as needed for dry eyes  Yes Unknown, Entered By History   imiquimod (ALDARA) 5 % cream Apply topically as needed  Yes Reported, Patient   insulin glargine (LANTUS PEN) 100 UNIT/ML pen Inject 18 Units  Subcutaneous 2 times daily  9/23/2019 Yes Unknown, Entered By History   insulin lispro (HUMALOG PEN) 100 UNIT/ML pen Inject Subcutaneous 3 times daily (before meals) Takes 2 units for every 50 BG above 150.  Yes Reported, Patient   insulin lispro (HUMALOG PEN) 100 UNIT/ML pen Inject 14 Units Subcutaneous 3 times daily (before meals)  9/23/2019 Yes Reported, Patient   irbesartan (AVAPRO) 300 MG tablet Take 1 tablet (300 mg) by mouth At Bedtime 9/23/2019 Yes Clemencia Pal MD   Isosorbide Mononitrate  MG TB24 Take 1 tablet (120 mg) by mouth every evening 9/23/2019 Yes Yuki Hinojosa APRN CNP   ketoconazole (NIZORAL) 2 % cream Apply topically daily  9/23/2019 Yes Reported, Patient   MAGNESIUM OXIDE PO Take 400 mg by mouth At Bedtime 9/23/2019 Yes Unknown, Entered By History   melatonin 5 MG tablet Take 5 mg by mouth At Bedtime 9/23/2019 Yes Reported, Patient   mirtazapine (REMERON) 15 MG tablet Take 15 mg by mouth At Bedtime 9/23/2019 Yes Reported, Patient   Nutritional Supplements (NEPRO PO) Take 1 capsule by mouth 3 times daily 1-3 times daily 9/23/2019 Yes Unknown, Entered By History   omega-3 acid ethyl esters (LOVAZA) 1 G capsule Take 2 g by mouth 2 times daily 9/23/2019 Yes Unknown, Entered By History   pantoprazole (PROTONIX) 40 MG EC tablet Take 40 mg by mouth daily 9/23/2019 Yes Unknown, Entered By History   polyethylene glycol (MIRALAX/GLYCOLAX) packet Take 1 packet by mouth daily as needed for constipation  Yes Reported, Patient   ritonavir (NORVIR) 100 MG capsule Take 1 capsule by mouth At Bedtime  9/23/2019 Yes Reported, Patient   sucroferric oxyhydroxide (VELPHORO) 500 MG CHEW chewable tablet Take 500 mg by mouth 3 times daily (with meals) 9/23/2019 Yes Unknown, Entered By History   DOXERCALCIFEROL IV Inject 6 mcg into the vein three times a week (with dialysis)   Reported, Patient   epoetin brittni (EPOGEN,PROCRIT) 15864 UNIT/ML injection Inject 11,000 Units Subcutaneous three times a week WITH  "DIALYSIS   Unknown, Entered By History   ipratropium - albuterol 0.5 mg/2.5 mg/3 mL (DUONEB) 0.5-2.5 (3) MG/3ML neb solution Take 1 vial (3 mLs) by nebulization every 6 hours as needed for shortness of breath / dyspnea or wheezing   Catrachito Rosado,    iron sucrose (VENOFER) 20 MG/ML injection Inject 50 mg into the vein once a week WIth dialysis   Unknown, Entered By History   nitroglycerin (NITROSTAT) 0.4 MG SL tablet One tablet under the tongue every 5 minutes if needed for chest pain. May repeat every 5 minutes for a maximum of 3 doses in 15 minutes\"   Lay Paz MD           "

## 2019-09-24 NOTE — PROGRESS NOTES
Potassium   Date Value Ref Range Status   09/24/2019 4.8 3.4 - 5.3 mmol/L Final     Hemoglobin   Date Value Ref Range Status   09/24/2019 9.7 (L) 13.3 - 17.7 g/dL Final     Creatinine   Date Value Ref Range Status   09/24/2019 12.10 (H) 0.66 - 1.25 mg/dL Final     Urea Nitrogen   Date Value Ref Range Status   09/24/2019 96 (H) 7 - 30 mg/dL Final     Sodium   Date Value Ref Range Status   09/24/2019 134 133 - 144 mmol/L Final     INR   Date Value Ref Range Status   05/13/2019 1.10 0.86 - 1.14 Final

## 2019-09-24 NOTE — H&P
[facility] Outpatient / Observation Unit  History and Physical Exam     Donald Raza MRN# [id number]   YOB: 1950 Age: 69 year old      Date of Admission: 9/24/2019    Primary care provider: Sukh Owen          Assessment:   Donald Raza is a 69 year old male with a complex medical history significant for COPD, depression, CAD with multiple NSTEMIs with stenting to LAD and diagonal, HFpEF, ESRD on hemodialysis M,T,TH, Sat, HIV, hypertension, hyperlipidemia, TIA, IDDM2, bradycardia s/p PPM, and chronic anemia who presents from dialysis clinic today without completing run for further assessment of chest pain.    Work up in ED reveals: hypertensive up to 212/105, intermittently tachypneic, fund of 96, creatinine of 12.1, troponin of 0.019, glucose of 149, hemoglobin of 9.7, chest x-ray shows dual lead cardiac device left chest wall, with lead tips projected over the RA and RV, mild cardiac enlargement, mild pulmonary venous congestion, otherwise lungs clear, and EKG at 0718 showed rate of 63 bpm and NSR, inferior infarct, age undetermined, ST and T wave abnormality, consider lateral ischemia, no acute ST changes with repeat EKG at 1016 showing rate of 67 bpm in sinus rhythm with sinus arrhythmia with first-degree AV block, ST and T wave abnormality, consider lateral ischemia, no significant changes compared to earlier on 9/24/19.    Patient is being registered to observation for further evaluation and to rule out possible ACS.     #Acute chest pain: suspect may be cardiac but h/o episodes of chest pain associated with elevated blood pressure and fluid overload. Unclear exact timing of onset of chest pain, per patient it started as he was having his blood pressure taken at dialysis this morning that he describes as sharp, left sided without radiation with associated shortness of breath, lightheadedness, and dizziness. Per patient different from previous episodes when he had stents placed that he  describes as squeezing. Improvement with receiving nitroglycerin via EMS enroute to ED, decreased from 6 to 1/10. He reports chest pain intermittently since having PPM placed in 05/2019. Initial troponin detectable at 0.019. Currently denies chest pain, EKG without acute ischemic changes. Last Lexiscan in 12/2018 was normal. Will follow troponins and monitor for recurrence of symptoms after dialysis or if with improvement in blood pressure he remains asymptomatic, if this is the case would hold off on further provacative testing. If chest pain recurs then would consider Lexiscan vs cardiology consult for recommendations with his complex CAD history.     #ESRD on hemodialysis: usually dialyzes Monday, Tuesday, Thursday, and Saturday, per patient Monday is an extra day in order to not pull off as much fluid the other days. Follows with Dr. Chavez of nephrology at [facility]. Missed dialysis yesterday due to transportation issue and again this morning due to coming into the ED for evaluation of chest pain. ED notified nephrology the patient will need dialysis today. He has a left UE fistula.   - Nephrology consult to arrange dialysis     #CAD, HLD: NSTEMI in 2015 s/p stenting to LAD, repeat LAD/diagonal stenting due to in-stent restenosis (08/2016), most recent coronary angiogram in 03/018 with patent stents. Follows with Dr. Diaz of cardiology and until recently was lost to f/u. Last seen by cardiology in 08/2019 where his Plavix was stopped.  - Continue PTA ASA 81 mg and Atorvastatin  - Suppose to be seen in f/u by cardiology in 02/2020    #Hypertensive urgency: BP up to 212/105 today, possibly contributing to presenting chest pain  - Resume PTA Carvedilol, Amlodipine, Imdur and Irbesartan after dialysis with parameters     #IDDM2: follows with Maria Esther of endocrinology. Most recent HgbA1c of 5.5 in 07/2019, falsely low due to anemia.   - Continue PTA Lantus 20 units BID  - NPO sliding scale   - Resume Humalog 14 units  "TID with meals once tolerating PO after dialysis     #Bradycardia s/p PPM (05/2019): currently in NSR, not paced     #HIV: continue PTA HAART (would ask patient to bring in these medications due to cost while observation), no acute infection or need for ID consult at this time    #Anemia of chronic disease: stable with Hgb of 9.7 today     #COPD: no acute exacerbation  - PRN duo nebs         Plan:     1. [place] to Observation  2. Continue telemetry  3. Follow serial troponins   4. Cont Aspirin EC 81 mg po daily  5. Blood pressure control  6. Nitroglycerine PRN for pain    7. Moderate carbohydrate, cardiac, low sodium diet after dialysis     DVT prophylaxis: pt at low risk, encourage ambulation  Code Status: Full, discussed with patient   Dispo: likely discharge in 24-48 hours, admit to observation                 Chief Complaint:   Chest Pain         History of Present Illness:    Donald, a 69 year old male, presents to the ED with complaint of chest pain.  Patient states that he always has some form of chest pain at baseline.  Patient states he used to have episodes of chest pain about once per month and supposedly has not had any chest pain for the last 4 months.  This morning while the patient was at dialysis and they were checking his blood pressure he started to have left-sided chest pain that he describes as sharp and stabbing without radiation into his jaw, neck, back, or left arm.  He notes associated shortness of breath, lightheadedness, and dizziness.  Denies associated nausea, vomiting, or diaphoresis.  The patient states that his chest pain is different than his prior episodes requiring stents in regards to that pain he described as a \"squeezing\" sensation.  The patient states that he has been having intermittent episodes of chest pain since his pacemaker was placed in May 2019.  Patient is unsure if his chest pain correlates with his elevated blood pressure.  The patient did not take any blood pressure " medications this morning due to plans for dialysis.  Patient received sublingual nitroglycerin by EMS which brought his pain from a 6 to 1/10.  Patient states he has not used any nitroglycerin at home recently.  Currently denies chest pain but still having ongoing shortness of breath.  Denies abdominal pain, cough, or congestion.  He uses a pain as needed at home.  Denies any recent changes in medications besides having his Plavix stopped when he was last seen by cardiology in August.    Cardiac risk factors: positive for abnormal lipids, diabetes mellitus, family history, hypertension and smoking          Past Medical History:     Past Medical History:   Diagnosis Date     Allergic rhinitis      Anemia due to chronic kidney disease 10/21/2011     CAD S/P percutaneous coronary angioplasty 6/15/2015     Cataract      CKD (chronic kidney disease)      Colon cancer (H)      COPD (chronic obstructive pulmonary disease) (H)      Depression      Dilated aortic root (H) 5/6/2016     Diverticulitis      ESRD (end stage renal disease) on dialysis (H) 5/6/2016     Gout      Human immunodeficiency virus (HIV) disease (H)      Hx of squamous cell carcinoma 03/23/2007     Hypertension 2010     Impotence of organic origin      Increased prostate specific antigen (PSA) velocity 08/08/2016    Awaiting bx on blood thinner     Mixed hyperlipidemia      Pulmonary HTN (H)     Mod     TIA (transient ischemic attack) 5/2016     Type 2 diabetes mellitus (H) age 50          Past Surgical History:     Past Surgical History:   Procedure Laterality Date     ANGIOGRAM  03-04-16    No culprit lesions, stents widely patent      ANGIOGRAM  05-06-16    Cutting balloon ptca=Diag     APPENDECTOMY  2000     CHOLECYSTECTOMY, LAPOROSCOPIC       COLON SURGERY       COLOSTOMY  09/30/1999    Temporary for diverticulitis     EP PACEMAKER N/A 5/2/2019    Procedure: EP Pacemaker;  Surgeon: Angelique Perera MD;  Location:  HEART CARDIAC CATH LAB      HC LEFT HEART CATHETERIZATION  2018    No significant change  Elevated LVEDp  LVEF 30% No PCI     HEART CATH, ANGIOPLASTY  16    LAD PCI. Stented with a 3.0 x 8 mm Xience Alpine stent.     IR DIALYSIS FISTULOGRAM LEFT  2019     STENT, CORONARY, S660 15/18  2015    VANITA=Diag, PTCA=LAD     STENT, CORONARY, S660 15/18  2015    VANITA=LAD          Social History:     Social History     Socioeconomic History     Marital status:      Spouse name: Not on file     Number of children: Not on file     Years of education: Not on file     Highest education level: Not on file   Occupational History     Not on file   Social Needs     Financial resource strain: Not on file     Food insecurity:     Worry: Not on file     Inability: Not on file     Transportation needs:     Medical: Not on file     Non-medical: Not on file   Tobacco Use     Smoking status: Former Smoker     Packs/day: 2.00     Years: 41.00     Pack years: 82.00     Types: Cigarettes     Last attempt to quit:      Years since quittin.7     Smokeless tobacco: Never Used   Substance and Sexual Activity     Alcohol use: No     Alcohol/week: 0.0 standard drinks     Drug use: No     Sexual activity: Not on file   Lifestyle     Physical activity:     Days per week: Not on file     Minutes per session: Not on file     Stress: Not on file   Relationships     Social connections:     Talks on phone: Not on file     Gets together: Not on file     Attends Mandaeism service: Not on file     Active member of club or organization: Not on file     Attends meetings of clubs or organizations: Not on file     Relationship status: Not on file     Intimate partner violence:     Fear of current or ex partner: Not on file     Emotionally abused: Not on file     Physically abused: Not on file     Forced sexual activity: Not on file   Other Topics Concern     Parent/sibling w/ CABG, MI or angioplasty before 65F 55M? Yes      Service Not Asked      Blood Transfusions Not Asked     Caffeine Concern Yes     Comment: 2 cups daily     Occupational Exposure Not Asked     Hobby Hazards Not Asked     Sleep Concern Yes     Stress Concern Not Asked     Weight Concern Not Asked     Special Diet No     Comment:  more proteins     Back Care Not Asked     Exercise Yes     Comment: Cardiac rehab      Bike Helmet Not Asked     Seat Belt Not Asked     Self-Exams Not Asked   Social History Narrative     Not on file          Family History:   Family history reviewed with patient and significant for heart disease.          Allergies:      Allergies   Allergen Reactions     Calcium Acetate Other (See Comments)     Other reaction(s): Other, see comments  Pain in chest and back  Pain in chest area (sensitive skin)      Contrast Dye Other (See Comments)     Contrast nephropathy     Diagnostic X-Ray Materials Other (See Comments)     PN: renal failure     Lisinopril      Sulfa Drugs           Medications:     Prior to Admission medications    Medication Sig Last Dose Taking? Auth Provider   abacavir (ZIAGEN) 300 MG tablet Take 600 mg by mouth every evening    Abstract, Provider   Acetaminophen (TYLENOL PO) Take 325 mg by mouth every 6 hours as needed for mild pain or fever    Unknown, Entered By History   albuterol (PROAIR HFA/PROVENTIL HFA/VENTOLIN HFA) 108 (90 BASE) MCG/ACT Inhaler Inhale 2 puffs into the lungs every 6 hours as needed for shortness of breath / dyspnea or wheezing   Nelson Worthy MD   amLODIPine (NORVASC) 5 MG tablet Take 1 tablet (5 mg) by mouth 2 times daily   Danny Azar MD   aspirin EC 81 MG EC tablet Take 1 tablet (81 mg) by mouth daily   Clemencia Pal MD   atorvastatin (LIPITOR) 40 MG tablet Take 40 mg by mouth At Bedtime   Unknown, Entered By History   B COMPLEX-C-FOLIC ACID PO Take 1 tablet by mouth At Bedtime    Reported, Patient   carvedilol (COREG) 25 MG tablet Take 1 tablet (25 mg) by mouth 2 times daily (with meals)   Nissa  Danny Nayak MD   chlorhexidine (CHLORHEXIDINE) 0.12 % solution Rinse and gargle 15 ml by mouth twice a day as directed.   Abstract, Provider   CLONAZEPAM PO Take 0.5 mg by mouth nightly as needed for anxiety (restless legs)   Unknown, Entered By History   dapsone 100 MG tablet Take 100 mg by mouth At Bedtime    Reported, Patient   Darunavir Ethanolate (PREZISTA PO) Take 800 mg by mouth At Bedtime.   Reported, Patient   dolutegravir (TIVICAY) 50 MG tablet Take 50 mg by mouth At Bedtime   Unknown, Entered By History   DOXERCALCIFEROL IV Inject 6 mcg into the vein three times a week (with dialysis)   Reported, Patient   epoetin brittni (EPOGEN,PROCRIT) 41581 UNIT/ML injection Inject 11,000 Units Subcutaneous three times a week WITH DIALYSIS   Unknown, Entered By History   gabapentin (NEURONTIN) 300 MG capsule Take 300 mg by mouth 2 times daily Pt takes 1 pill in the AM and 2 pill in the PM   Unknown, Entered By History   guaiFENesin (MUCINEX) 600 MG 12 hr tablet Take 1,200 mg by mouth 2 times daily as needed    Unknown, Entered By History   imiquimod (ALDARA) 5 % cream Apply topically as needed   Reported, Patient   insulin glargine (LANTUS PEN) 100 UNIT/ML pen Inject 15 Units Subcutaneous 2 times daily   Unknown, Entered By History   insulin lispro (HUMALOG PEN) 100 UNIT/ML pen Inject 12 Units Subcutaneous 3 times daily (before meals)   Reported, Patient   ipratropium - albuterol 0.5 mg/2.5 mg/3 mL (DUONEB) 0.5-2.5 (3) MG/3ML neb solution Take 1 vial (3 mLs) by nebulization every 6 hours as needed for shortness of breath / dyspnea or wheezing   Catrachito Rosado DO   irbesartan (AVAPRO) 300 MG tablet Take 1 tablet (300 mg) by mouth At Bedtime   Clemencia Pal MD   iron sucrose (VENOFER) 20 MG/ML injection Inject 50 mg into the vein once a week WIth dialysis   Unknown, Entered By History   Isosorbide Mononitrate  MG TB24 Take 1 tablet (120 mg) by mouth every evening   Yuki Hinojosa, DEDRICK CNP  "  ketoconazole (NIZORAL) 2 % cream Apply topically daily    Reported, Patient   ketorolac (ACULAR) 0.5 % ophthalmic solution INSTILL 1 GTT IN OPERATIVE EYE QID STARTING 3 DAYS PRIOR TO SURGERY. CONTINUE AFTER SURGERY FOR 2 WEEKS FOLLOWING   Unknown, Entered By History   MAGNESIUM OXIDE PO Take 400 mg by mouth At Bedtime   Unknown, Entered By History   melatonin 5 MG tablet Take 5 mg by mouth At Bedtime   Reported, Patient   mirtazapine (REMERON) 15 MG tablet Take 15 mg by mouth At Bedtime   Reported, Patient   moxifloxacin (VIGAMOX) 0.5 % ophthalmic solution 1 drop in operative eye 4x daily starting 3 days prior to surgery, then continue after surgery for 1 week following post op instructions   Unknown, Entered By History   nitroglycerin (NITROSTAT) 0.4 MG SL tablet One tablet under the tongue every 5 minutes if needed for chest pain. May repeat every 5 minutes for a maximum of 3 doses in 15 minutes\"   Lay Paz MD   Nutritional Supplements (NEPRO PO) 1-3 times daily   Unknown, Entered By History   omega-3 acid ethyl esters (LOVAZA) 1 G capsule Take 2 g by mouth 2 times daily   Unknown, Entered By History   pantoprazole (PROTONIX) 40 MG EC tablet Take 40 mg by mouth daily   Unknown, Entered By History   polyethylene glycol (MIRALAX/GLYCOLAX) packet Take 1 packet by mouth daily as needed for constipation   Reported, Patient   prednisoLONE acetate (PRED FORTE) 1 % ophthalmic suspension Start after surgery: 1 drop in operative eye 4x daily for 1 wk, then 3x daily for 1 wk, then 2x daily for 1 wk, then 1x daily for 1 wk   Unknown, Entered By History   ritonavir (NORVIR) 100 MG capsule Take 1 capsule by mouth At Bedtime    Reported, Patient   sucroferric oxyhydroxide (VELPHORO) 500 MG CHEW chewable tablet Take 500 mg by mouth 3 times daily (with meals)   Unknown, Entered By History          Review of Systems:   A Comprehensive greater than 10 system review of systems was carried out.  Pertinent positives and " "negatives are noted above.  Otherwise negative for contributory information.          Physical Exam:   Blood pressure (!) 175/86, pulse 62, temperature 97.2  F (36.2  C), temperature source Oral, resp. rate 16, height 1.803 m (5' 11\"), weight 90.7 kg (199 lb 15.3 oz), SpO2 98 %.    GENERAL: healthy, alert and no distress  EYES: Eyes grossly normal to inspection, extraocular movements - intact, and PERRL  HENT: ear canals- normal; TMs- normal; Nose- normal; Mouth- no ulcers, no lesions  NECK: no tenderness, no adenopathy, no asymmetry, no masses, no stiffness; thyroid- normal to palpation  RESP: lungs clear to auscultation - no rales, no rhonchi, no wheezes  CV: regular rates and rhythm, normal S1 S2, no S3 or S4 and no murmur, click or rub  ABDOMEN: soft, no tenderness, no  hepatosplenomegaly, no masses, normal bowel sounds  MS: extremities- no gross deformities noted, no edema. LUE fistula   SKIN: no suspicious lesions, no rashes  NEURO: strength and tone- normal, sensory exam- grossly normal, mentation- intact, speech- normal, reflexes- symmetric  PSYCH: Alert and oriented times 3; coherent speech. Affect is normal.         Data:     EKG at 0718 showed rate of 63 bpm and NSR, inferior infarct, age undetermined, ST and T wave abnormality, consider lateral ischemia, no acute ST changes with repeat EKG at 1016 showing rate of 67 bpm in sinus rhythm with sinus arrhythmia with first-degree AV block, ST and T wave abnormality, consider lateral ischemia, no significant changes compared to earlier on 9/24/19    Results for orders placed or performed during the hospital encounter of 09/24/19   XR Chest 2 Views    Narrative    CHEST TWO VIEWS  9/24/2019 8:51 AM     HISTORY: Chest pain.    COMPARISON: 8/8/2019.      Impression    IMPRESSION: Dual lead cardiac device left chest wall, with lead tips  projected over the RA and RV. Mild cardiac enlargement. Mild pulmonary  venous congestion. The lungs are otherwise clear. No " pleural  effusions. Aortic calcification.    TELLO WATKINS MD   Glucose by meter   Result Value Ref Range    Glucose 147 (H) 70 - 99 mg/dL   Troponin I (now)   Result Value Ref Range    Troponin I ES 0.019 0.000 - 0.045 ug/L   Basic metabolic panel (BMP)   Result Value Ref Range    Sodium 134 133 - 144 mmol/L    Potassium 4.8 3.4 - 5.3 mmol/L    Chloride 99 94 - 109 mmol/L    Carbon Dioxide 22 20 - 32 mmol/L    Anion Gap 13 3 - 14 mmol/L    Glucose 149 (H) 70 - 99 mg/dL    Urea Nitrogen 96 (H) 7 - 30 mg/dL    Creatinine 12.10 (H) 0.66 - 1.25 mg/dL    GFR Estimate 4 (L) >60 mL/min/[1.73_m2]    GFR Estimate If Black 4 (L) >60 mL/min/[1.73_m2]    Calcium 9.7 8.5 - 10.1 mg/dL   CBC with platelets differential   Result Value Ref Range    WBC 5.9 4.0 - 11.0 10e9/L    RBC Count 3.25 (L) 4.4 - 5.9 10e12/L    Hemoglobin 9.7 (L) 13.3 - 17.7 g/dL    Hematocrit 31.2 (L) 40.0 - 53.0 %    MCV 96 78 - 100 fl    MCH 29.8 26.5 - 33.0 pg    MCHC 31.1 (L) 31.5 - 36.5 g/dL    RDW 14.6 10.0 - 15.0 %    Platelet Count 126 (L) 150 - 450 10e9/L    Diff Method Automated Method     % Neutrophils 73.9 %    % Lymphocytes 17.5 %    % Monocytes 6.4 %    % Eosinophils 1.2 %    % Basophils 0.5 %    % Immature Granulocytes 0.5 %    Nucleated RBCs 0 0 /100    Absolute Neutrophil 4.4 1.6 - 8.3 10e9/L    Absolute Lymphocytes 1.0 0.8 - 5.3 10e9/L    Absolute Monocytes 0.4 0.0 - 1.3 10e9/L    Absolute Eosinophils 0.1 0.0 - 0.7 10e9/L    Absolute Basophils 0.0 0.0 - 0.2 10e9/L    Abs Immature Granulocytes 0.0 0 - 0.4 10e9/L    Absolute Nucleated RBC 0.0    Glucose by meter   Result Value Ref Range    Glucose 119 (H) 70 - 99 mg/dL   Troponin I   Result Value Ref Range    Troponin I ES 0.029 0.000 - 0.045 ug/L   EKG 12-lead, tracing only   Result Value Ref Range    Interpretation ECG Click View Image link to view waveform and result    EKG 12-lead, tracing only   Result Value Ref Range    Interpretation ECG Click View Image link to view waveform and  result        Lisa Ceja PA-C

## 2019-09-24 NOTE — ED PROVIDER NOTES
"  History     Chief Complaint:  Chest Pain    The history is provided by the patient and the EMS personnel.      Donald Raza is a 69 year old male with a history of coronary artery disease, HIV (reports undetectable viral loads recently), ESRD on hemodialysis M-Tu-Th-Sat, HTN, and IDDM who presents with chest pain. Donald missed dialysis yesterday (Monday) due to \"transportation issues.\" He went today and the RN told him his blood pressure was too high and recommended he come to the ER. He did not get HD today. He also developed sharp, non-radiating left sided chest pain this morning - though he is unclear if this started before or after he was told his BP was elevated. This pain was 8/10 initially but improved to 1-2/10 after 4 nitroglycerin and aspirin administered by paramedics. He had no change in his usual shortness of breath and no nausea or diaphoresis. Donald states the pain he is having today is similar to some epsiodes - not all - that required him to have stents placed for CAD. He denies recent cough, fever, or illness. He has no new leg swelling.     His last Echocardiogram was in May 2019 (see below) and last Lexiscan in December 2018 was normal.    Echocardiogram Complete 5/02/2019  Probably normal left evntricular systolic function. The basal anterolateral  endocardium was not ideally seen.  The visual ejection fraction is estimated at 60-65%.  Mild valvular aortic stenosis.  The ascending aorta is Mildly dilated.    Allergies:  Calcium Acetate  Contrast Dye  Diagnostic X-Ray Materials  Lisinopril  Sulfa Drugs      Medications:    Ziagen   Albuterol  Aspirin 81mg   Atorvastatin   Clonazepam   Dapsone  Prezista   Tivicay   Epogen injection   Gabapentin  Lantus  Humalog   Duoneb   Avapro   Venofer injection   Mag-Ox   Melatonin   Remeron   Nitrostat   Lovaza  Pantoprazole   Miralax   Ritonavir   Velphoro     Past Medical History:    Allergic rhinitis   Anemia d/t Chronic Kidney Disease   Coronary artery " "disease    Cataract  Chronic Kidney Disease   Colon cancer  COPD   Dilated aortic root   Depression  Diverticulitis   ESRD on dialysis   Gout  HIV disease   History of SCC   Hypertension  Impotence of organic origin   Increased PSA velocity   Pulmonary hypertension   Mixed hyperlipidemia    TIA   Type II DM   Bilateral pneumonia   Sepsis  Cerebral hypoperfusion, transient   Unstable angina pectoris  Acute pulmonary edema  Respiratory failure   Organ transplant candidate    Past Surgical History:    Angiogram x2   Appendectomy    Cholecystectomy    Colon surgery   Colostomy   EP pacemaker  Left heart cath   IR Dialysis fistulagram left   Stent, coronary x3     Family History:    Brother - CABG, heart disease   Sister - kidney disease, hypertension     Social History:  The patient was brought to the ED by EMS.  Smoking Status: Former smoker 2.00 PPD for 41 years  Smokeless Tobacco: Never  Alcohol Use: No   Marital Status:        Review of Systems   Constitutional: Negative for diaphoresis and fever.   Respiratory: Negative for cough and shortness of breath.    Cardiovascular: Positive for chest pain. Negative for leg swelling.   Gastrointestinal: Negative for nausea and vomiting.   Musculoskeletal: Negative for arthralgias and back pain.   All other systems reviewed and are negative.    Physical Exam     Patient Vitals for the past 24 hrs:   BP Temp Temp src Pulse Heart Rate Resp SpO2 Height Weight   09/24/19 1104 -- -- -- -- -- -- -- 1.803 m (5' 11\") --   09/24/19 1053 (!) 184/83 96.6  F (35.9  C) Oral -- 64 20 95 % -- 90.7 kg (200 lb)   09/24/19 1030 (!) 188/91 -- -- 62 68 23 95 % -- --   09/24/19 1000 (!) 212/105 -- -- 68 71 19 95 % -- --   09/24/19 0945 -- -- -- -- 68 19 95 % -- --   09/24/19 0930 (!) 195/90 -- -- 66 71 21 95 % -- --   09/24/19 0800 (!) 192/95 -- -- 64 65 16 94 % -- --   09/24/19 0715 (!) 193/88 97.9  F (36.6  C) Oral -- 62 18 94 % -- --      Physical Exam  General: Well-developed and " well-nourished. Well appearing elderly  man. Cooperative.  Head:  Atraumatic.  Eyes:  Conjunctivae, lids, and sclerae are normal.  ENT:    Normal nose. Moist mucous membranes.  Neck:  Supple. Normal range of motion.  CV:  Regular rate and rhythm. Normal heart sounds with no murmurs, rubs, or gallops detected. Left upper arm fistula with palpable thrill.  Resp:  No respiratory distress. Mild rales at bilateral bases without decreased breath sounds, wheezing, or rhonchi.  GI:  Soft. Non-distended. Non-tender.    MS:  Normal ROM. No bilateral lower extremity edema.  Skin:  Warm. Non-diaphoretic. No pallor.  Neuro:  Awake. A&Ox3. Normal strength.  Psych: Normal mood and affect. Normal speech.  Vitals reviewed.    Emergency Department Course     EKG #1  Indication: Chest pain   Time: 0718  Rate 63 bpm. IN interval 206. QRS duration 90. QT/QTc 412/421.   Normal sinus rhythm   Inferior infarct, age undetermined   ST & T wave abnormality, consider lateral ischemia   Abnormal ECG   No acute ST changes.  No changes as compared to prior, dated 08/08/2019    EKG #2  Indication: Chest pain and Shortness of breath; Repeat EKG  Time: 1016  Rate 67 bpm. IN interval 220. QRS duration 88. QT/QTc 398/420.   Sinus rhythm with sinus arrhythmia with 1st degree AV block   ST & T wave abnormality, consider lateral ischemia   Abnormal ECG   No acute ST changes.  No significant changes as compared to prior, dated 9/24/2019 (above).    Imaging:  Radiology findings were communicated with the patient who voiced understanding of the findings.    XR Chest 2 Views  Preliminary Result  IMPRESSION: Dual lead cardiac device left chest wall, with lead tips  projected over the RA and RV. Mild cardiac enlargement. Mild pulmonary  venous congestion. The lungs are otherwise clear. No pleural  effusions. Aortic calcification.  Report per radiology     Laboratory:  Laboratory findings were communicated with the patient who voiced understanding of  the findings.    CBC: HGB 9.7 (L),  (L) o/w WNL. (WBC 5.9)   BMP: Glucose 149 (H), BUN 96 (H), Creatinine 12.10 (H), GFR Estimate 4 (L) o/w WNL     Troponin (Collected 0835): 0.019  Glucose by meter (Collected 0733): 147 (H)       Interventions:  1025 - Nitroglycerin 0.4mg SL     Emergency Department Course:  Past medical records, nursing notes, and vitals reviewed.    0823: I performed an exam of the patient as documented above.     EKG obtained in the ED, see results above.   IV was inserted and blood was drawn for laboratory testing, results above.  The patient was sent for a CXR while in the emergency department, results above.     0953: I spoke with Basia Ceja PA-C, for Dr. West, of the hospitalist service regarding patient's presentation, findings, and plan of care.    1005: I spoke with Dr. Marcial of the nephrology service regarding patient's presentation, findings, and plan of care.    1020: EDT updated me that patient was complaining of recurrent chest pain with shortness of breath. Repeat EKG performed.     1021: Patient rechecked and updated. Current chest pain rated 2/10. Shortness of breath is worsening. Oxygen was 100% on room air. SL nitroglycerin administered.    Findings and plan explained to the patient who consents to admission. Discussed the patient with Dr. West, who will admit the patient to an observation bed for further monitoring, evaluation, and treatment.     I personally reviewed the laboratory and imaging results with the patient and answered all related questions prior to admission.      Impression & Plan     Medical Decision Making:  Donald is a 69-year-old man with ESRD on HD Monday/Tuesday/Thursday/Saturday as well as a history of coronary artery disease who presents with chest pain.  He did miss dialysis yesterday due to transportation issues and when he got to dialysis today had sharp left-sided chest pain and a blood pressure of 200 systolic such that he was sent here  without dialysis.  He was given nitroglycerin en route and reports his pain has improved to only 1 or 2 out of 10.  He remarks he has otherwise been well.  His exam is unremarkable with only mild bibasilar rales which could very well be his baseline.  He has no lower extremity edema.  His EKG is reassuring without acute ST changes or arrhythmias.  During his emergency department stay he did have mildly increased shortness of breath with chest pain for which EKG was repeated and is unchanged.  He was given nitroglycerin again with resolution of his pain though he still had some mild shortness of breath.  Troponin is negative but detectable at 0.019.  The remainder of his labs are reassuring including no hyperkalemia, though creatinine is 12.10 as expected for a patient who is now missed 2 dialysis sessions.  Chest x-ray reveals no acute pathology such as pneumonia or pneumothorax though he does have mild cardiac enlargement and mild pulmonary venous congestion.  This patient requires admission for further chest pain work-up and rule out, particularly given his history of coronary artery disease and numerous comorbidities.  He will also need dialysis as I think a degree of his symptoms are related to hypervolemia and hypertension.  He has remained hypertensive here between 180s and 210s systolic which is likely to improve simply with dialysis and he was given no antihypertensives in the ER aside from aforementioned SL nitroglycerin.  I discussed his results and plan for admission with the patient and answered all his questions.  He verbalized understanding and is amenable.  I discussed patient with Basia Ceja, hospitalist PA, who accepts admission and requests consultation with nephrology as well.  I discussed patient's case with Dr. Marcial, nephrology, who is aware of the patient's admission and need for dialysis.  He has no further orders.  Patient was admitted to the floor in stable condition.    Discharge  Diagnosis:    ICD-10-CM    1. Chest pain, unspecified type R07.9    2. ESRD (end stage renal disease) on dialysis (H) N18.6     Z99.2    3. Uncontrolled hypertension I10        Disposition:  Admitted to observation      Scribe Disclosure:  Felisha BARRERA, am serving as a scribe at 7:28 AM on 9/24/2019 to document services personally performed by Maria Fernanda Rowley MD based on my observations and the provider's statements to me.     Felisha Guerrero  9/24/2019   [facility] EMERGENCY DEPARTMENT       Maria Fernanda Rowley MD  09/24/19 2039

## 2019-09-24 NOTE — PLAN OF CARE
"PRIMARY DIAGNOSIS: CHEST PAIN  OUTPATIENT/OBSERVATION GOALS TO BE MET BEFORE DISCHARGE:  1. Ruled out acute coronary syndrome (negative or stable Troponin):  Troponins negative x 2 sets  2. Pain Status: Improved  3. Appropriate provocative testing performed: N/A  - Stress Test Procedure: NA   - Interpretation of cardiac rhythm per telemetry tech: Sinus Rhythm, occasional PACs.      4. Cleared by Consultants (if applicable):No - nephrology consult  5. Return to near baseline physical activity: Yes, SBA   Blood pressure (!) 166/78, pulse 61, temperature 94  F (34.4  C), temperature source Oral, resp. rate 16, height 1.803 m (5' 11\"), weight 90.7 kg (199 lb 15.3 oz), SpO2 98 %.  Patient returning from dialysis at 1755.  VSS  BP elevated, patient has history of HTN.  LS clear, adequate sats on room air.   at 18:00 PM.  Patient denies any chest pain or discomfort  At thus time.  Patient tolerating evening renal meal tray.  Plan:  Continue to provide supportive cares.    Discharge Planner Nurse   Safe discharge environment identified: Yes  Barriers to discharge: No       Entered by: Lawrence Bingham 09/24/2019 2:27 PM     Please review provider order for any additional goals.   Nurse to notify provider when observation goals have been met and patient is ready for discharge.    "

## 2019-09-24 NOTE — ED TRIAGE NOTES
Arrives via ems from dialysis. Normal run days mon, tues, thurs, & sat. Arrived at dialysis this am and started to have CP so dialysis was not started. Pt states he didn't go to his normal scheduled day yesterday. CP initially 5/10 and went to 1/10 after 4 nitroglycerin and 4 asa. Midsternal and non radiating. . Currently;y 2/10 pain

## 2019-09-24 NOTE — CONSULTS
RENAL CONSULTATION NOTE    REFERRING MD:  Lisa Ceja PA-C    REASON FOR CONSULTATION:  ESRD     HPI:  69 y.o man with HIV, CAD s/p coronary stents, HTN and ESRD, who was admitted for chest pain. Patient gets hemodialysis on Monday, Tuesday, Thursday and Saturday. He missed his dialysis on Monday due to transportation. He came to dialysis this morning. His blood pressure was high. He developed chest pain so he was directed here. Troponin is negative x 2. He is getting HD treatment, and he is chest pain free. He denies shortness of breat. No cough. No fever.     ROS:  A complete review of systems was performed and is negative except as noted above.    PMH:    Past Medical History:   Diagnosis Date     Allergic rhinitis      Anemia due to chronic kidney disease 10/21/2011     CAD S/P percutaneous coronary angioplasty 6/15/2015     Cataract      CKD (chronic kidney disease)      Colon cancer (H)      COPD (chronic obstructive pulmonary disease) (H)      Depression      Dilated aortic root (H) 5/6/2016     Diverticulitis      ESRD (end stage renal disease) on dialysis (H) 5/6/2016     Gout      Human immunodeficiency virus (HIV) disease (H)      Hx of squamous cell carcinoma 03/23/2007     Hypertension 2010     Impotence of organic origin      Increased prostate specific antigen (PSA) velocity 08/08/2016    Awaiting bx on blood thinner     Mixed hyperlipidemia      Pulmonary HTN (H)     Mod     TIA (transient ischemic attack) 5/2016     Type 2 diabetes mellitus (H) age 50       PSH:    Past Surgical History:   Procedure Laterality Date     ANGIOGRAM  03-04-16    No culprit lesions, stents widely patent      ANGIOGRAM  05-06-16    Cutting balloon ptca=Diag     APPENDECTOMY  2000     CHOLECYSTECTOMY, LAPOROSCOPIC       COLON SURGERY       COLOSTOMY  09/30/1999    Temporary for diverticulitis     EP PACEMAKER N/A 5/2/2019    Procedure: EP Pacemaker;  Surgeon: Angelique ePrera MD;  Location: [facility]  CARDIAC CATH LAB      LEFT HEART CATHETERIZATION  2018    No significant change  Elevated LVEDp  LVEF 30% No PCI     HEART CATH, ANGIOPLASTY  16    LAD PCI. Stented with a 3.0 x 8 mm Xience Alpine stent.     IR DIALYSIS FISTULOGRAM LEFT  2019     STENT, CORONARY, S660 15/18  2015    VANITA=Diag, PTCA=LAD     STENT, CORONARY, S660 15/18  2015    VANITA=LAD       MEDICATIONS:      [START ON 2019] aspirin  81 mg Oral Daily     insulin aspart  1-6 Units Subcutaneous Q4H     sodium chloride (PF)  3 mL Intracatheter Q8H       ALLERGIES:    Allergies as of 2019 - Reviewed 2019   Allergen Reaction Noted     Calcium acetate Other (See Comments) 2017     Contrast dye Other (See Comments) 2014     Diagnostic x-ray materials Other (See Comments) 2012     Lisinopril  2012     Sulfa drugs  2012       FH:    Family History   Problem Relation Age of Onset     Heart Disease Brother 40        CABG     Kidney Disease Sister      Hypertension Sister      Heart Disease Brother         Dilated aorta       SH:    Social History     Socioeconomic History     Marital status:      Spouse name: Not on file     Number of children: Not on file     Years of education: Not on file     Highest education level: Not on file   Occupational History     Not on file   Social Needs     Financial resource strain: Not on file     Food insecurity:     Worry: Not on file     Inability: Not on file     Transportation needs:     Medical: Not on file     Non-medical: Not on file   Tobacco Use     Smoking status: Former Smoker     Packs/day: 2.00     Years: 41.00     Pack years: 82.00     Types: Cigarettes     Last attempt to quit:      Years since quittin.7     Smokeless tobacco: Never Used   Substance and Sexual Activity     Alcohol use: No     Alcohol/week: 0.0 standard drinks     Drug use: No     Sexual activity: Not on file   Lifestyle     Physical activity:     Days per week:  "Not on file     Minutes per session: Not on file     Stress: Not on file   Relationships     Social connections:     Talks on phone: Not on file     Gets together: Not on file     Attends Orthodoxy service: Not on file     Active member of club or organization: Not on file     Attends meetings of clubs or organizations: Not on file     Relationship status: Not on file     Intimate partner violence:     Fear of current or ex partner: Not on file     Emotionally abused: Not on file     Physically abused: Not on file     Forced sexual activity: Not on file   Other Topics Concern     Parent/sibling w/ CABG, MI or angioplasty before 65F 55M? Yes      Service Not Asked     Blood Transfusions Not Asked     Caffeine Concern Yes     Comment: 2 cups daily     Occupational Exposure Not Asked     Hobby Hazards Not Asked     Sleep Concern Yes     Stress Concern Not Asked     Weight Concern Not Asked     Special Diet No     Comment:  more proteins     Back Care Not Asked     Exercise Yes     Comment: Cardiac rehab      Bike Helmet Not Asked     Seat Belt Not Asked     Self-Exams Not Asked   Social History Narrative     Not on file       PHYSICAL EXAM:    BP (!) 183/72 (BP Location: Right arm)   Pulse 62   Temp 97.2  F (36.2  C) (Oral)   Resp 16   Ht 1.803 m (5' 11\")   Wt 90.7 kg (200 lb)   SpO2 98%   BMI 27.89 kg/m    GENERAL: pleasant, alert, NAD  HEENT:  Normocephalic. No gross abnormalities.  Pupils equal.  MMM.    CV: RRR, soft murmurs, no clicks, gallops, or rubs, no edema  RESP: Clear bilaterally with good efforts. No wheezes or crackles.   GI: Abdomen obese, soft, NT  MUSCULOSKELETAL: extremities nl - no gross deformities noted. No edema.   SKIN: no suspicious lesions or rashes, dry to touch  NEURO:  Strength normal and symmetric. A/o x3.   PSYCH: mood good, affect appropriate  LYMPH: No palpable ant/post cervical     LABS:      CBC RESULTS:     Recent Labs   Lab 09/24/19  0835   WBC 5.9   RBC 3.25*   HGB " 9.7*   HCT 31.2*   *       BMP RESULTS:  Recent Labs   Lab 09/24/19  0835      POTASSIUM 4.8   CHLORIDE 99   CO2 22   BUN 96*   CR 12.10*   *   PRABHA 9.7       INRNo lab results found in last 7 days.     DIAGNOSTICS:  Reviewed    A/P:  69 y.o man with history as above, admitted for chest pain.     # ESRD:    -2.5 hrs, EDW 88.5, Qb 450, 2K, LAVF, heparin loading dose of 2000 units and no maintenance gtt.    -post wt ~ 90.9 kg on 9/12  # Anemia: Epogen 12564 units  # HIV: on ARV  # HTN: Resumed regimen  # CAD s/p coronary stents.     Plan:  # 2.5 hrs HD, UF 2 liters, Epogen 15K units.   # Next HD tx on Thursday if he remains hospitalized    Doyle Marcial MD  [facility] - Nephrology  Office Phone: [phone]  Pager: [phone]

## 2019-09-25 NOTE — PLAN OF CARE
PRIMARY DIAGNOSIS: CHEST PAIN  OUTPATIENT/OBSERVATION GOALS TO BE MET BEFORE DISCHARGE:  1. Ruled out acute coronary syndrome (negative or stable Troponin):  Yes, 3 trops negative  2. Pain Status: Pain free.  3. Appropriate provocative testing performed: Yes  - Stress Test Procedure: none  - Interpretation of cardiac rhythm per telemetry tech: SR with 1st degree AV block, and demand A-paced     4. Cleared by Consultants (if applicable):N/A  5. Return to near baseline physical activity: No     Patient is Alert and Oriented x4. They are 1 Assist with Gait Belt and Cane.  Pt is a Renal diet.  They are denying chestpain.  Patient is Saline locked.       Discharge Planner Nurse   Safe discharge environment identified: Yes  Barriers to discharge: No     Please review provider order for any additional goals.   Nurse to notify provider when observation goals have been met and patient is ready for discharge.

## 2019-09-25 NOTE — DISCHARGE INSTRUCTIONS
Your hospital follow up appointment has been scheduled for you with Bryson Guerrero at [facility] for October 1st at 2:00pm. Please bring your hospital discharge instructions and any new medications with you to your appointment. Please call the clinic at [phone] if you need to reschedule.

## 2019-09-25 NOTE — PLAN OF CARE
Patient's After Visit Summary was reviewed with patient   Patient verbalized understanding of After Visit Summary, recommended follow up and was given an opportunity to ask questions.   Discharge medications sent home with patient/family: Not applicable   Discharged with daughter

## 2019-09-25 NOTE — PROGRESS NOTES
Pt walked in the arciniega with gait belt and can with assist A1 100ft,pt wasn't stable wile he was walking.

## 2019-09-25 NOTE — PLAN OF CARE
Patient Improving    PRIMARY DIAGNOSIS: CHEST PAIN  OUTPATIENT/OBSERVATION GOALS TO BE MET BEFORE DISCHARGE:  1. Ruled out acute coronary syndrome (negative or stable Troponin):  Yes, 3 trops negative  2. Pain Status: Pain free.  3. Appropriate provocative testing performed: Yes  - Stress Test Procedure: none  - Interpretation of cardiac rhythm per telemetry tech: SR with 1st degree AV block, and demand A-paced    4. Cleared by Consultants (if applicable):No  5. Return to near baseline physical activity: No  Vitals are Temp: 98.2  F (36.8  C) Temp src: Oral BP: 128/73 Pulse: 68 Heart Rate: 62 Resp: 18 SpO2: 95 %.  Patient is Alert and Oriented x4. They are 1 Assist with Gait Belt and Cane.  Pt is a Renal diet.  They are denying pain.  Patient is Saline locked.  Pt glucose was 112.  Pt had dialysis yesterday.    Discharge Planner Nurse   Safe discharge environment identified: Yes  Barriers to discharge: Yes          Please review provider order for any additional goals.   Nurse to notify provider when observation goals have been met and patient is ready for discharge.

## 2019-09-25 NOTE — PROGRESS NOTES
[facility]  Met with pt to discuss plans for HC.  Pt to be discharged home today and has agreed to have [facility] follow with services of SN and PT. Patient care support center processing referral.  Pt verbalized understanding that initial visit is scheduled for 9/26 or 9/27/19.  Pt has 24 hour phone number for [facility] for any questions or concerns.

## 2019-09-25 NOTE — DISCHARGE SUMMARY
[facility] Outpatient / Observation Unit  Discharge Summary        Donald Raza MRN# [id number]   YOB: 1950 Age: 69 year old     Date of Admission: 9/24/2019  Date of Discharge: 9/25/2019  Admitting Physician: Meek West MD  Discharge Physician: Lisa Ceja PA-C  Discharging Service: Hospitalist      Primary Provider: Sukh Owen  Primary Care Physician Phone Number: [phone]         Primary Discharge Diagnoses:    Donald Raza is a 69 year old male with a complex medical history significant for COPD, depression, CAD with multiple NSTEMIs with stenting to LAD and diagonal, HFpEF, ESRD on hemodialysis M,T,TH, Sat, HIV, hypertension, hyperlipidemia, TIA, IDDM2, bradycardia s/p PPM, and chronic anemia who presented from dialysis clinic on 9/24/19 without completing run for further assessment of chest pain.    #Acute chest pain: per patient onset of chest pain when he was having his blood pressure taken at dialysis morning of admission that he described as sharp, left sided without radiation with associated shortness of breath, lightheadedness, and dizziness. Serial troponins negative, ruled out ACS.  Suspect may be secondary to hypertension. Discussed with patient following up with his primary care doctor to discuss if he has recurrence of his pain and if so consider outpatient Lexiscan at that time.    #Hypertensive urgency: BP up to 212/105 on admission, possibly contributed to presenting chest pain. Continued PTA Carvedilol, Amlodipine, Imdur and Irbesartan after dialysis with parameters. BP improved to 150s/40s prior to discharge. Will need close outpatient monitoring and may need further adjustments to regimen if continues to be uncontrolled.     #ESRD on hemodialysis: usually dialyzes Monday, Tuesday, Thursday, and Saturday, per patient Monday is an extra day in order to not pull off as much fluid the other days. Follows with Dr. Chavez of nephrology at [facility]. Missed dialysis on  9/23 due to transportation issue and again the morning of 9/24 due to coming into the ED for evaluation of chest pain.  Underwent dialysis run on 9/24 without complications after being seen by nephrology.    #Generalized weakness, deconditioning: due to concerns of increased assistance with mobility per nursing staff the patient was assessed by physical therapy during stay and it was recommended the patient continue with home PT upon discharge.     Remainder of other chronic medical conditions remained stable during hospitalization and no medication adjustments were made.         Secondary Discharge Diagnoses:     Past Medical History:   Diagnosis Date     Allergic rhinitis      Anemia due to chronic kidney disease 10/21/2011     CAD S/P percutaneous coronary angioplasty 6/15/2015     Cataract      CKD (chronic kidney disease)      Colon cancer (H)      COPD (chronic obstructive pulmonary disease) (H)      Depression      Dilated aortic root (H) 5/6/2016     Diverticulitis      ESRD (end stage renal disease) on dialysis (H) 5/6/2016     Gout      Human immunodeficiency virus (HIV) disease (H)      Hx of squamous cell carcinoma 03/23/2007     Hypertension 2010     Impotence of organic origin      Increased prostate specific antigen (PSA) velocity 08/08/2016    Awaiting bx on blood thinner     Mixed hyperlipidemia      Pulmonary HTN (H)     Mod     TIA (transient ischemic attack) 5/2016     Type 2 diabetes mellitus (H) age 50              Code Status:      Full Code        Brief Hospital Summary:        Reason for your hospital stay      You were admitted to the observation unit for chest pain. Your heart was   monitored overnight with no abnormal findings. Your cardiac enzymes were   negative for heart attack. Suspect your chest pain and shortness of breath   may be related to missing dialysis and elevated blood pressure with   improvement with blood pressure control and receiving dialysis run. We   recommend you  follow up with your primary doctor if you continue to have   pain and if you do it may be warranted to repeat a chemical stress test.             Please refer to initial admission history and physical for further details.    Briefly, Donald Raza was admitted on 9/24/2019 for concerns of acute chest pain. Initial work up in the ED did not reveal evidence of STEMI or findings consistent with unstable angina or acute coronary ischemia. Pt was registered to the Observation Unit for further evaluation.     Pt ruled out with serial troponins that were negative. Labs were reviewed and significant results addressed. On the day of discharge, pt was pain free, with no complaints of pain. Medications were reviewed and adjustments made as necessary. Pt is instructed to follow up as below.           Significant Lab During Hospitalization:      Serial troponins: 0.019 -> 0.029 -> <0.015         Significant Imaging During Hospitalization:      Results for orders placed or performed during the hospital encounter of 09/24/19   XR Chest 2 Views    Narrative    CHEST TWO VIEWS  9/24/2019 8:51 AM     HISTORY: Chest pain.    COMPARISON: 8/8/2019.      Impression    IMPRESSION: Dual lead cardiac device left chest wall, with lead tips  projected over the RA and RV. Mild cardiac enlargement. Mild pulmonary  venous congestion. The lungs are otherwise clear. No pleural  effusions. Aortic calcification.    TELLO WATKINS MD              Pending Results:      None        Consultations This Hospital Stay:      No consultations were requested during this admission         Discharge Instructions and Follow-Up:      Follow-up Appointments     Follow-up and recommended labs and tests       Follow up with primary care provider, Sukh Owen, within 7 days for   hospital follow- up.  No follow up labs or test are needed, discuss if   recurrent chest pain and if you do possibly arranging any outpatient   stress test (Lexiscan).                  Discharge Disposition:      Discharged to home         Discharge Medications:        Current Discharge Medication List      CONTINUE these medications which have NOT CHANGED    Details   abacavir (ZIAGEN) 300 MG tablet Take 600 mg by mouth every evening       Acetaminophen (TYLENOL PO) Take 325 mg by mouth every 6 hours as needed for mild pain or fever       albuterol (PROAIR HFA/PROVENTIL HFA/VENTOLIN HFA) 108 (90 BASE) MCG/ACT Inhaler Inhale 2 puffs into the lungs every 6 hours as needed for shortness of breath / dyspnea or wheezing  Qty: 1 Inhaler, Refills: 1    Associated Diagnoses: Cough      amLODIPine (NORVASC) 5 MG tablet Take 1 tablet (5 mg) by mouth 2 times daily  Qty: 60 tablet, Refills: 0    Associated Diagnoses: Hypertensive emergency      aspirin EC 81 MG EC tablet Take 1 tablet (81 mg) by mouth daily  Qty: 30 tablet, Refills: 0    Associated Diagnoses: Coronary artery disease, angina presence unspecified, unspecified vessel or lesion type, unspecified whether native or transplanted heart      atorvastatin (LIPITOR) 40 MG tablet Take 40 mg by mouth At Bedtime      B COMPLEX-C-FOLIC ACID PO Take 1 tablet by mouth At Bedtime       carvedilol (COREG) 25 MG tablet Take 1 tablet (25 mg) by mouth 2 times daily (with meals)  Qty: 60 tablet, Refills: 0    Associated Diagnoses: Hypertensive emergency      chlorhexidine (CHLORHEXIDINE) 0.12 % solution Rinse and gargle 15 ml by mouth twice a day as directed.      CLONAZEPAM PO Take 0.5 mg by mouth nightly as needed for anxiety (restless legs)      dapsone 100 MG tablet Take 100 mg by mouth At Bedtime       Darunavir Ethanolate (PREZISTA PO) Take 800 mg by mouth At Bedtime.      dolutegravir (TIVICAY) 50 MG tablet Take 50 mg by mouth At Bedtime      !! gabapentin (NEURONTIN) 300 MG capsule Take 300 mg by mouth as needed May take after HD.      !! gabapentin (NEURONTIN) 300 MG capsule Take 300 mg by mouth 2 times daily       guaiFENesin (MUCINEX) 600 MG 12 hr  tablet Take 1,200 mg by mouth 2 times daily as needed       hypromellose-dextran (ARTIFICAL TEARS) 0.1-0.3 % ophthalmic solution 1 drop daily as needed for dry eyes      imiquimod (ALDARA) 5 % cream Apply topically as needed      insulin glargine (LANTUS PEN) 100 UNIT/ML pen Inject 18 Units Subcutaneous 2 times daily       !! insulin lispro (HUMALOG PEN) 100 UNIT/ML pen Inject Subcutaneous 3 times daily (before meals) Takes 2 units for every 50 BG above 150.      !! insulin lispro (HUMALOG PEN) 100 UNIT/ML pen Inject 14 Units Subcutaneous 3 times daily (before meals)       irbesartan (AVAPRO) 300 MG tablet Take 1 tablet (300 mg) by mouth At Bedtime  Qty: 30 tablet, Refills: 0    Associated Diagnoses: Hypertension secondary to other renal disorders      Isosorbide Mononitrate  MG TB24 Take 1 tablet (120 mg) by mouth every evening  Qty: 30 tablet, Refills: 11    Associated Diagnoses: Coronary artery disease involving native heart, angina presence unspecified, unspecified vessel or lesion type; Hypertension secondary to other renal disorders      ketoconazole (NIZORAL) 2 % cream Apply topically daily       MAGNESIUM OXIDE PO Take 400 mg by mouth At Bedtime      melatonin 5 MG tablet Take 5 mg by mouth At Bedtime      mirtazapine (REMERON) 15 MG tablet Take 15 mg by mouth At Bedtime      Nutritional Supplements (NEPRO PO) Take 1 capsule by mouth 3 times daily 1-3 times daily      omega-3 acid ethyl esters (LOVAZA) 1 G capsule Take 2 g by mouth 2 times daily      pantoprazole (PROTONIX) 40 MG EC tablet Take 40 mg by mouth daily      polyethylene glycol (MIRALAX/GLYCOLAX) packet Take 1 packet by mouth daily as needed for constipation      ritonavir (NORVIR) 100 MG capsule Take 1 capsule by mouth At Bedtime       sucroferric oxyhydroxide (VELPHORO) 500 MG CHEW chewable tablet Take 500 mg by mouth 3 times daily (with meals)      DOXERCALCIFEROL IV Inject 6 mcg into the vein three times a week (with dialysis)     "  epoetin brittni (EPOGEN,PROCRIT) 52831 UNIT/ML injection Inject 11,000 Units Subcutaneous three times a week WITH DIALYSIS      ipratropium - albuterol 0.5 mg/2.5 mg/3 mL (DUONEB) 0.5-2.5 (3) MG/3ML neb solution Take 1 vial (3 mLs) by nebulization every 6 hours as needed for shortness of breath / dyspnea or wheezing  Qty: 90 vial, Refills: 1    Associated Diagnoses: Acute respiratory failure with hypoxia (H)      iron sucrose (VENOFER) 20 MG/ML injection Inject 50 mg into the vein once a week WIth dialysis      nitroglycerin (NITROSTAT) 0.4 MG SL tablet One tablet under the tongue every 5 minutes if needed for chest pain. May repeat every 5 minutes for a maximum of 3 doses in 15 minutes\"  Qty: 25 tablet, Refills: 3    Associated Diagnoses: Coronary artery disease due to lipid rich plaque; Status post coronary angioplasty       !! - Potential duplicate medications found. Please discuss with provider.              Allergies:         Allergies   Allergen Reactions     Calcium Acetate Other (See Comments)     Other reaction(s): Other, see comments  Pain in chest and back  Pain in chest area (sensitive skin)      Contrast Dye Other (See Comments)     Contrast nephropathy     Diagnostic X-Ray Materials Other (See Comments)     PN: renal failure     Lisinopril      Sulfa Drugs            Condition and Physical on Discharge:      Discharge condition: Stable   Vitals: Blood pressure (!) 157/74, pulse 68, temperature 98.1  F (36.7  C), temperature source Oral, resp. rate 18, height 1.803 m (5' 11\"), weight 90.7 kg (199 lb 15.3 oz), SpO2 95 %.  199 lbs 15.32 oz      GENERAL: healthy, alert and no distress  EYES: Eyes grossly normal to inspection, extraocular movements - intact, and PERRL  HENT: ear canals- normal; TMs- normal; Nose- normal; Mouth- no ulcers, no lesions  NECK: no tenderness, no adenopathy, no asymmetry, no masses, no stiffness; thyroid- normal to palpation  RESP: lungs clear to auscultation - no rales, no " rhonchi, no wheezes  CV: regular rates and rhythm, normal S1 S2, no S3 or S4 and no murmur, click or rub  ABDOMEN: soft, no tenderness, no  hepatosplenomegaly, no masses, normal bowel sounds  MS: extremities- no gross deformities noted, no edema. LUE fistula   SKIN: no suspicious lesions, no rashes  NEURO: strength and tone- normal, sensory exam- grossly normal, mentation- intact, speech- normal, reflexes- symmetric  PSYCH: Alert and oriented times 3; coherent speech. Affect is normal.    Lisa Ceja PA-C

## 2019-09-25 NOTE — PLAN OF CARE
Patient Improving    PRIMARY DIAGNOSIS: CHEST PAIN  OUTPATIENT/OBSERVATION GOALS TO BE MET BEFORE DISCHARGE:  1. Ruled out acute coronary syndrome (negative or stable Troponin):  Yes  2. Pain Status: Pain free.  3. Appropriate provocative testing performed: Yes  - Stress Test Procedure: none  - Interpretation of cardiac rhythm per telemetry tech: SR with 1st degree AV block, and demand A-paced    4. Cleared by Consultants (if applicable):No  5. Return to near baseline physical activity: No  Vitals are Temp: 98.2  F (36.8  C) Temp src: Oral BP: 128/73 Pulse: 68 Heart Rate: 62 Resp: 18 SpO2: 95 %.  Patient is Alert and Oriented x4. They are 1 Assist with Gait Belt and Cane.  Pt is a Renal diet.  They are denying pain.  Patient is Saline locked.  Pt glucose was 112.  Pt had dialysis yesterday.    Discharge Planner Nurse   Safe discharge environment identified: Yes  Barriers to discharge: Yes          Please review provider order for any additional goals.   Nurse to notify provider when observation goals have been met and patient is ready for discharge.

## 2019-09-25 NOTE — CONSULTS
Care Transition Initial Assessment - RN        Met with: Patient.  DATA        Cognitive Status: awake, alert and oriented, some forgetfulness.  Primary Care Clinic Name: [facility]   Primary Care MD Name: Sukh Rivera  Contact information and PCP information verified: Yes  Lives With: spouse, sibling(s)   Living Arrangements: (P) house  Quality of Family Relationships: involved, supportive  Description of Support System: Supportive, Involved   Who is your support system?: Wife, PCA, Sibling(s)   Support Assessment: Adequate family and caregiver support   Insurance concerns: No Insurance issues identified  ASSESSMENT  Patient currently receives the following services:  PCA support 4-5hrs/day provided by his Daughter, Pura.  She also provides transportation to dialysis and doctors appointments.         Identified issues/concerns regarding health management: Patient identifies Elevated MUSHTAQ. Patient has x3 previous inpatient visits and x1 ED in the last 6 mo. He was admitted with chest pain and high blood pressure. Patient has a complex medical history significant for  HIV, ESRD with HD M, T, Th and Sa, CAD s/p stents, bradycardia with PM placement 5/2019, HTN, HLD, COPD, anemia and IDDM type 2. Per chart review, patient having difficulty with transportation to and from dialysis. Patient living at home with his wife and brother. states his family is supportive and involve. His brother works and is sometimes available to pick him up from dialysis. Patient's daughter, Pura, is his PCA. Patient does not drive. His daughter is his primary support with transportation for dialysis and other appointments. She sets up his medications. Patient reports taking his medications as prescribed. He is unable to name what he takes. Patient reports having diabetes supplies at home and able to check his blood sugar. Patient is using his inhaler 1x/day and neb 1x/noc. Provided patient with transportation resource for [facility]  [employer] [phone], [employer] provide community transport for small fee - $5 per ride and [employer] transportation # [phone]. Discussed following closely with his doctors for ongoing health management of complex medical history. Encouraged following with his PCP. Patient reports difficulty getting an appointment with his PCP, Dr. Owen, as he is often booked. Encouraged post hospitalization follow up with another PCP at his clinic and scheduling with his PCP for future appointments. Physical therapy recommending home PT. Patient agreeable to home PT. He elects [facility]. Patient can also benefit from home RN. A request was sent to  Home Care. A post hospitalization follow up was scheduled with Bryson Guerrero NP for October 1st at 2pm.       PLAN  Financial costs for the patient include none .  Patient given options and choices for discharge yes .  Patient/family is agreeable to the plan?  Yes: home with home care.   Patient anticipates discharging to home with family and home care service .        Patient anticipates needs for home equipment: No  Transportation/person available to transport on day of discharge  is daughter, Pura. Per patient, She can be available for transportation around 4:30 pm.     Plan/Disposition: home with home care    Appointments:   An appointment was schedule at [facility] for October 1st with Bryson Guerrero NP at 2:00 pm.       Care  (CTS) will continue to follow as needed.    Rob Mina RN BSN CTS  Care Management Team  [facility]

## 2019-09-25 NOTE — PLAN OF CARE
Discharge Planner PT   Patient plan for discharge: home, agreeable to home PT  Current status: Pt orders received, eval & treat completed. Pt is 69 y.o. M admitted to observation 09/24 with chest pain. Pt lives in split level house with spouse and brother, pt states he primarily stays on upper level. 2 steps to enter- L rail, 7 steps with L rail to upper/main level. Pt previously ind ambulation with SEC, does also own FWW and 4WW.   Currently, pt received supine in bed, agreeable to PT. Supine>sit from flat bed with SBA and use of SEC. Sit>supine Mod ind. Donned socks and boots sitting EOB with assist. Sit>stand with SEC and CGA, slow to come to full standing. Ambulated 120' total; initial 60' with SEC and CGA-SBA, short step length with some unsteadiness, second 60' with 4WW and SBA, improved steadiness. Performed 1x4 steps with L rail and SEC and CGA, fatigues easily, 1 misstep with small LOB- recovered with CGA for safety. After seated rest, ambulated 20' with FWW and SBA- improved steadiness, though fatigues easily. Sit<>stand with FWW and SBA. Sit>supine with supervision. Discussed recommendation for home PT and use of walker at home initially, pt agreeable. Pt supine in bed upon departure.  Barriers to return to prior living situation: none if family able to provide assist with stairs  Recommendations for discharge: home with use of FWW, assist for stairs including assist bringing walker up/down steps and Home PT  Rationale for recommendations: Patient appears below baseline mobility with decreased strength, impaired balance and decreased activity tolerance. Pt demonstrates mobility adequate for return home with assist for stairs. Pt will need home PT to progress safety and ind with mobility toward baseline and will qualify for home PT due to significant taxing effort requiring assist of person and AD to leave home.     Physical Therapy Discharge Summary    Reason for therapy discharge:    Initial IP PT goals  met, pt mobility adequate for discharge home with assist as described above. Defer further PT needs to next level of care    Progress towards therapy goal(s). See goals on Care Plan in [facility] electronic health record for goal details.  Goals met    Therapy recommendation(s):    Continued therapy is recommended.  Rationale/Recommendations:  Pt will benefit from home PT to progress safety and independence with mobility toward baseline and to assess home set-up.           Entered by: Katarzyna Pereira 09/25/2019 12:19 PM

## 2019-09-25 NOTE — PROGRESS NOTES
09/25/19 1100   Quick Adds   Type of Visit Initial PT Evaluation   Living Environment   Lives With spouse;sibling(s)   Living Arrangements house   Home Accessibility stairs to enter home;stairs within home   Number of Stairs, Main Entrance 2   Stair Railings, Main Entrance railing on left side (ascending)   Number of Stairs, Within Home, Primary 7   Stair Railings, Within Home, Primary railing on left side (ascending)   Transportation Anticipated family or friend will provide  (Simultaneous filing. User may not have seen previous data.)   Living Environment Comment Bathroom set up includes bathtub-shower on lower level with grab bar, walk-in shower with no grab bars. Has standard height toilet with no grab bars.   Self-Care   Usual Activity Tolerance moderate   Current Activity Tolerance fair   Equipment Currently Used at Home cane, straight;walker, rolling  (walker- rarely uses)   Functional Level Prior   Ambulation 1-->assistive equipment  (straight cane)   Transferring 1-->assistive equipment   Fall history within last six months no  (pt reports he lost balance but did not fall)   Which of the above functional risks had a recent onset or change? ambulation;transferring   General Information   Onset of Illness/Injury or Date of Surgery - Date 09/24/19   Referring Physician Lisa Ceja PA-C   Patient/Family Goals Statement to go home   Pertinent History of Current Problem (include personal factors and/or comorbidities that impact the POC) Pt is 69 y.o. M admitted to observation 09/24 with chest pain.    Precautions/Limitations fall precautions   General Info Comments Activity: Up ad hanane   Cognitive Status Examination   Orientation orientation to person, place and time   Level of Consciousness alert   Follows Commands and Answers Questions 100% of the time;able to follow single-step instructions   Personal Safety and Judgment intact   Pain Assessment   Patient Currently in Pain Yes, see Vital Sign  flowsheet  (reports baseline foot pain 6/10 )   Posture    Posture Forward head position;Protracted shoulders   Range of Motion (ROM)   ROM Comment ROM grossly WFL   Strength   Strength Comments Strength not formally assessed, pt appears to have some functional weakness with transfer, needs extra time   Bed Mobility   Bed Mobility Comments Supine>sit from flat bed with SBA and use of SEC   Transfer Skills   Transfer Comments Sit>stand with SEC and CGA, slow to come to full standing   Gait   Gait Comments Ambulated 20' with SEC and CGA, very short step length with unsteadiness noted   Balance   Balance Comments impaired balance with SEC, needs CGA - SBA for safety   Sensory Examination   Sensory Perception Comments Reports baseline numbness/tingling B feet and lower legs   General Therapy Interventions   Planned Therapy Interventions bed mobility training;gait training;transfer training;home program guidelines   Clinical Impression   Criteria for Skilled Therapeutic Intervention yes, treatment indicated   PT Diagnosis difficulty ambulating   Influenced by the following impairments impaired balance, decreased strength, decreased activity tolerance   Functional limitations due to impairments difficulty with bed mobility, transfers, ambuation and stairs   Clinical Presentation Stable/Uncomplicated   Clinical Presentation Rationale clinical judgment   Clinical Decision Making (Complexity) Low complexity   Therapy Frequency   (1 eval and treat only)   Predicted Duration of Therapy Intervention (days/wks) 1 day   Anticipated Equipment Needs at Discharge   (none, pt owns FWW, 4WW and SEC)   Anticipated Discharge Disposition Home with Assist;Home with Home Therapy  (assist for steps)   Risk & Benefits of therapy have been explained Yes   Patient, Family & other staff in agreement with plan of care Yes   Clinical Impression Comments Patient appears below baseline mobility with decreased strength, impaired balance and  "decreased activity tolerance. Pt demonstrates mobility adequate for return home with assist for stairs. Pt will need home PT to progress safety and ind with mobility toward baseline and will qualify for home PT due to significant taxing effort requiring assist of person and AD to leave home.    [facility] TM \"6 Clicks\"   2016, Trustees of [facility], under license to [employer].  All rights reserved.   6 Clicks Short Forms Basic Mobility Inpatient Short Form   [facility]  \"6 Clicks\" V.2 Basic Mobility Inpatient Short Form   1. Turning from your back to your side while in a flat bed without using bedrails? 3 - A Little   2. Moving from lying on your back to sitting on the side of a flat bed without using bedrails? 3 - A Little   3. Moving to and from a bed to a chair (including a wheelchair)? 3 - A Little   4. Standing up from a chair using your arms (e.g., wheelchair, or bedside chair)? 3 - A Little   5. To walk in hospital room? 3 - A Little   6. Climbing 3-5 steps with a railing? 3 - A Little   Basic Mobility Raw Score (Score out of 24.Lower scores equate to lower levels of function) 18   Total Evaluation Time   Total Evaluation Time (Minutes) 15     "

## 2019-10-15 NOTE — PHARMACY-ADMISSION MEDICATION HISTORY
Admission medication history interview status for this patient is complete. See [facility] admission navigator for allergy information, prior to admission medications and immunization status.     Medication history interview source(s):Patient and Family  Medication history resources (including written lists, pill bottles, clinic record): Faxed list from [facility] dialysis for updated dialysis medications  Primary pharmacy: mailorder     Changes made to PTA medication list:  Added: cinacalcet, clonidine, incruse ellipta  Deleted: iron sucrose infusion  Changed: doxercalciferol dose, epoetin alpha dose, changed artificial tears to PRN, changed lantus dose to 24 units BID (pt reported), changed melatonin to PRN.     Actions taken by pharmacist (provider contacted, etc):None     Additional medication history information: Added clonidine -- Exact BP parameter not known; pt received recently from clinic to help with HTN control. Used 5 tabs in the last week.    Patient states he took all of his medications last on Sunday evening. He wasn't feeling well yesterday and didn't eat much (only 1 egg).     Medication reconciliation/reorder completed by provider prior to medication history?  Yes     Do you take OTC medications (eg tylenol, ibuprofen, fish oil, eye/ear drops, etc)? Yes     For patients on insulin therapy: Yes  Lantus/levemir/NPH/toujeo/tresiba/Mix 70/30 dose:  Yes  Sliding scale Novolog: Yes  If Yes, do you have a baseline novolog pre-meal dose:  14  units with meals  Patients eat three meals a day: NA  How many episodes of hypoglycemia do you have per week: maybe 1 episode every TWO weeks     Prior to Admission medications    Medication Sig Last Dose Taking? Auth Provider   abacavir (ZIAGEN) 300 MG tablet Take 600 mg by mouth every evening  10/13/2019 at pm Yes Abstract, Provider   aspirin 81 MG EC tablet Take 81 mg by mouth every evening Past Week at Unknown time Yes Unknown, Entered By History   cinacalcet (SENSIPAR) 30  MG tablet Take 30 mg by mouth three times a week On dialysis days  Yes Unknown, Entered By History   cloNIDine (CATAPRES) 0.1 MG tablet Take 0.1 mg by mouth daily as needed (for high blood pressure)   Yes Unknown, Entered By History   guaiFENesin (MUCINEX) 600 MG 12 hr tablet Take 1,200 mg by mouth 2 times daily as needed  Past Month at Unknown time Yes Unknown, Entered By History   hypromellose-dextran (ARTIFICAL TEARS) 0.1-0.3 % ophthalmic solution Place 1 drop into both eyes daily as needed for dry eyes  Past Week at Unknown time Yes Unknown, Entered By History   imiquimod (ALDARA) 5 % cream Apply topically as needed Past Month at Unknown time Yes Reported, Patient   insulin glargine (LANTUS PEN) 100 UNIT/ML pen Inject 24 Units Subcutaneous 2 times daily  10/14/2019 at am Yes Unknown, Entered By History   insulin lispro (HUMALOG PEN) 100 UNIT/ML pen Inject 14 Units Subcutaneous 3 times daily (before meals)  10/13/2019 at Unknown time Yes Reported, Patient   ipratropium - albuterol 0.5 mg/2.5 mg/3 mL (DUONEB) 0.5-2.5 (3) MG/3ML neb solution Take 1 vial (3 mLs) by nebulization every 6 hours as needed for shortness of breath / dyspnea or wheezing Past Week at Unknown time Yes Catrachito Rosado,    melatonin 5 MG tablet Take 5 mg by mouth nightly as needed  Past Month at Unknown time Yes Reported, Patient   polyethylene glycol (MIRALAX/GLYCOLAX) packet Take 1 packet by mouth daily as needed for constipation Past Month at Unknown time Yes Reported, Patient   sucroferric oxyhydroxide (VELPHORO) 500 MG CHEW chewable tablet Take 500 mg by mouth 3 times daily (with meals) Past Week at Unknown time Yes Unknown, Entered By History   umeclidinium (INCRUSE ELLIPTA) 62.5 MCG/INH inhaler Inhale 1 puff into the lungs daily Past Week at Unknown time Yes Unknown, Entered By History   Acetaminophen (TYLENOL PO) Take 325 mg by mouth every 6 hours as needed for mild pain or fever  10/13/2019  Unknown, Entered By History    albuterol (PROAIR HFA/PROVENTIL HFA/VENTOLIN HFA) 108 (90 BASE) MCG/ACT Inhaler Inhale 2 puffs into the lungs every 6 hours as needed for shortness of breath / dyspnea or wheezing   Nelson Worthy MD   amLODIPine (NORVASC) 5 MG tablet Take 1 tablet (5 mg) by mouth 2 times daily 10/13/2019  Danny Azar MD   atorvastatin (LIPITOR) 40 MG tablet Take 40 mg by mouth At Bedtime 10/13/2019  Unknown, Entered By History   B COMPLEX-C-FOLIC ACID PO Take 1 tablet by mouth At Bedtime  10/13/2019  Reported, Patient   carvedilol (COREG) 25 MG tablet Take 1 tablet (25 mg) by mouth 2 times daily (with meals) 10/13/2019  Danny Azar MD   chlorhexidine (CHLORHEXIDINE) 0.12 % solution Rinse and gargle 15 ml by mouth twice a day as directed. 10/13/2019  Abstract, Provider   CLONAZEPAM PO Take 0.5 mg by mouth nightly as needed for anxiety (restless legs) Unknown at Unknown time  Unknown, Entered By History   dapsone 100 MG tablet Take 100 mg by mouth At Bedtime  10/13/2019  Reported, Patient   Darunavir Ethanolate (PREZISTA PO) Take 800 mg by mouth At Bedtime. 10/13/2019  Reported, Patient   dolutegravir (TIVICAY) 50 MG tablet Take 50 mg by mouth At Bedtime 10/13/2019  Unknown, Entered By History   DOXERCALCIFEROL IV Inject 1 mcg into the vein three times a week (with dialysis); per [facility] dialysis records 10/15/19   Reported, Patient   epoetin brittni (EPOGEN,PROCRIT) 11841 UNIT/ML injection Inject 15,000 Units Subcutaneous three times a week WITH DIALYSIS; per [facility] dialysis records 10/15/19   Unknown, Entered By History   gabapentin (NEURONTIN) 300 MG capsule Take 300 mg by mouth as needed May take after HD. Unknown at Unknown time  Unknown, Entered By History   gabapentin (NEURONTIN) 300 MG capsule Take 300 mg by mouth 2 times daily  10/13/2019  Unknown, Entered By History   insulin lispro (HUMALOG PEN) 100 UNIT/ML pen Inject Subcutaneous 3 times daily (before meals) Takes 2 units for every 50 BG above 150.   " Reported, Patient   irbesartan (AVAPRO) 300 MG tablet Take 1 tablet (300 mg) by mouth At Bedtime 10/13/2019  Clemencia Pal MD   Isosorbide Mononitrate  MG TB24 Take 1 tablet (120 mg) by mouth every evening 10/13/2019  Yuki Hinojosa, APRN CNP   ketoconazole (NIZORAL) 2 % cream Apply topically daily Between toes for fungal infection   Reported, Patient   MAGNESIUM OXIDE PO Take 400 mg by mouth At Bedtime 10/13/2019  Unknown, Entered By History   mirtazapine (REMERON) 15 MG tablet Take 15 mg by mouth At Bedtime 10/13/2019  Reported, Patient   nitroglycerin (NITROSTAT) 0.4 MG SL tablet One tablet under the tongue every 5 minutes if needed for chest pain. May repeat every 5 minutes for a maximum of 3 doses in 15 minutes\"   Lay Paz MD   Nutritional Supplements (NEPRO PO) Take 1 Container by mouth 3 times daily 1-3 times daily   Unknown, Entered By History   omega-3 acid ethyl esters (LOVAZA) 1 G capsule Take 2 g by mouth 2 times daily 10/13/2019  Unknown, Entered By History   pantoprazole (PROTONIX) 40 MG EC tablet Take 40 mg by mouth daily 10/13/2019  Unknown, Entered By History   ritonavir (NORVIR) 100 MG capsule Take 1 capsule by mouth At Bedtime  10/13/2019  Reported, Patient           "

## 2019-10-15 NOTE — PROGRESS NOTES
"SPIRITUAL HEALTH SERVICES Progress Note  [facility] ICU    Spiritual Health visit per consult order for emotional/spiritual support.  Pt alert and seated in bedside chair. States he had \"difficulty with breathing and am here for pneumonia\".  Pt engaged in life review about his spouse, brother, two daughters, six grandchildren who live locally and are supportive.  Donald shared that he moved to [place] from [place] early in his life and had a long career as a .  He expressed his concern about his declining health and doesn't want to be \"a burden on my family\". Donald wonders why he is still here though he believes God will take him \"when it is my time\".       Pt is [employer] and appreciates daily communion. Prayer welcomed for strength and healing.    Oriented to Spiritual Health Services and availability for emotional/spiritual support during hospital stay.     Plan: Spiritual Health Services remains available for additional emotional/spiritual support.    Delonte Quevedo MA  Staff   Pager: [phone]  Phone: [phone]        "

## 2019-10-15 NOTE — PROGRESS NOTES
Renal Medicine Inpatient Dialysis Note                                Donald Raza MRN# [id number]   Age: 69 year old YOB: 1950   Date of Admission: 10/15/2019 Hospital LOS: 0          Assessment/Plan:       Admitted with CP, SOB, hypertensive urgency and fever        1.  ESRD              -[facility]              -Dr. Dominik Chavez              -AVF              -target weight 88.5              -off 10/12/19 at 90.2 kg  2.  Anemia              -MIHAI  3.  Mineral Bone Disease              -Hectorol              -last PTH 09/14/19 583 pg/ml  4.  Hypertensive urgency              -BP improved with UF      TTHS schedule  Resume oral BP meds      Interval History:     Dialysis run parameters reviewed with dialysis RN at patient bedside    2.5 hour run time  2 liter UF  Nitroglycerine gtt decreased based on SBP          ROS     GENERAL: NAD, No fever,chills  R: NEGATIVE for significant cough or SOB  CV: NEGATIVE for chest pain, palpitations  EXT: no change edema  ROS otherwise negative    Dialysis Parameters:     Vitals were reviewed  Patient Vitals for the past 8 hrs:   BP Temp Temp src Pulse Heart Rate Resp SpO2 Weight   10/15/19 1030 98/63 -- -- -- 60 -- 96 % --   10/15/19 1015 114/66 -- -- 60 60 -- 100 % --   10/15/19 1000 109/65 96.4  F (35.8  C) Temporal 60 60 -- 99 % --   10/15/19 0945 95/60 -- -- 60 60 -- 100 % --   10/15/19 0930 116/62 -- -- 60 60 -- 100 % --   10/15/19 0915 111/62 -- -- 60 60 -- 100 % --   10/15/19 0900 130/73 -- -- 63 60 -- 99 % --   10/15/19 0845 135/80 -- -- 60 60 -- 98 % --   10/15/19 0830 (!) 152/118 -- -- 61 61 -- 96 % --   10/15/19 0815 (!) 159/82 -- -- 62 63 -- 99 % --   10/15/19 0800 (!) 164/83 -- -- 67 64 -- 98 % --   10/15/19 0745 (!) 170/87 -- -- 61 62 -- 99 % --   10/15/19 0700 (!) 186/92 -- -- 66 65 -- 98 % --   10/15/19 0645 (!) 172/81 -- -- 65 67 -- -- 90.5 kg (199 lb 8.3 oz)   10/15/19 0635 (!) 179/95 97.2  F (36.2  C) Temporal 72 72 -- -- --    10/15/19 0620 (!) 177/80 -- -- 66 68 23 95 % --   10/15/19 0615 (!) 178/106 -- -- 66 66 21 94 % --   10/15/19 0613 -- -- -- -- 67 17 94 % --   10/15/19 0612 -- -- -- -- 68 9 94 % --   10/15/19 0611 -- -- -- -- 68 13 94 % --   10/15/19 0610 (!) 175/86 -- -- 66 70 15 93 % --   10/15/19 0609 -- -- -- -- 69 12 93 % --   10/15/19 0604 (!) 165/83 -- -- 68 65 12 93 % --   10/15/19 0601 -- -- -- -- 61 20 94 % --   10/15/19 0600 (!) 191/99 -- -- -- 63 -- 94 % --   10/15/19 0559 -- -- -- -- 61 21 93 % --   10/15/19 0558 (!) 182/91 -- -- 66 64 15 93 % --   10/15/19 0557 -- -- -- -- 66 24 94 % --   10/15/19 0556 -- -- -- -- 64 26 94 % --   10/15/19 0555 (!) 191/99 -- -- 66 67 29 93 % --   10/15/19 0554 -- -- -- -- 66 16 93 % --   10/15/19 0553 -- -- -- -- 69 23 92 % --   10/15/19 0552 -- -- -- -- 75 19 94 % --   10/15/19 0551 -- -- -- -- 79 21 94 % --   10/15/19 0550 -- -- -- -- 77 25 94 % --   10/15/19 0549 -- -- -- -- 76 23 94 % --   10/15/19 0548 -- -- -- -- 77 21 93 % --   10/15/19 0547 -- -- -- -- 79 22 92 % --   10/15/19 0546 (!) 204/109 -- -- 79 -- -- 90 % --   10/15/19 0545 (!) 204/109 -- -- 79 80 20 (!) 89 % --   10/15/19 0542 -- -- -- -- 80 27 98 % --   10/15/19 0541 -- -- -- -- 80 20 98 % --   10/15/19 0540 (!) 217/104 -- -- 80 81 24 97 % --   10/15/19 0539 -- -- -- -- 85 (!) 32 95 % --   10/15/19 0538 -- -- -- -- 80 17 97 % --   10/15/19 0537 -- -- -- -- 84 23 96 % --   10/15/19 0536 -- -- -- -- 84 24 96 % --   10/15/19 0535 (!) 225/115 -- -- 80 81 21 96 % --   10/15/19 0534 -- -- -- -- 84 22 95 % --   10/15/19 0533 -- -- -- -- 82 25 96 % --   10/15/19 0532 (!) 226/115 -- -- 83 82 24 96 % --   10/15/19 0531 -- -- -- -- 81 20 96 % --   10/15/19 0530 (!) 226/115 -- -- -- 84 21 96 % --   10/15/19 0529 -- -- -- -- 86 18 96 % --   10/15/19 0528 -- -- -- -- 83 22 96 % --   10/15/19 0527 -- -- -- 82 85 27 95 % --   10/15/19 0526 -- -- -- -- 81 24 95 % --   10/15/19 0525 -- -- -- -- 83 28 96 % --   10/15/19 0524 -- -- --  -- 81 (!) 39 96 % --   10/15/19 0523 -- -- -- -- 82 23 95 % --   10/15/19 0522 -- -- -- -- 86 24 99 % --   10/15/19 0521 -- -- -- -- 86 28 98 % --   10/15/19 0520 (!) 241/124 -- -- 84 81 24 97 % --   10/15/19 0519 -- -- -- -- 85 22 98 % --   10/15/19 0518 -- -- -- -- 85 16 98 % --   10/15/19 0517 -- -- -- -- 84 23 98 % --   10/15/19 0516 -- -- -- -- 85 26 97 % --   10/15/19 0515 (!) 242/122 -- -- 88 88 22 98 % --   10/15/19 0514 -- -- -- -- 88 16 98 % --   10/15/19 0513 -- -- -- -- 89 24 98 % --   10/15/19 0512 -- -- -- -- 86 26 98 % --   10/15/19 0511 -- -- -- -- 86 21 97 % --   10/15/19 0510 (!) 256/126 -- -- 88 90 17 97 % --   10/15/19 0509 -- -- -- -- 93 22 98 % --   10/15/19 0508 -- -- -- -- 93 (!) 45 98 % --   10/15/19 0507 (!) 250/133 -- -- 91 98 23 98 % --   10/15/19 0506 -- -- -- -- 99 23 97 % --   10/15/19 0505 (!) 250/128 -- -- 97 102 22 93 % --   10/15/19 0504 -- -- -- -- 105 22 93 % --   10/15/19 0503 -- -- -- -- 110 22 93 % --   10/15/19 0502 -- -- -- -- 108 19 93 % --   10/15/19 0501 -- -- -- -- 109 27 91 % --   10/15/19 0500 (!) 203/152 -- -- 111 108 19 92 % --   10/15/19 0455 -- -- -- -- 101 27 90 % --   10/15/19 0450 (!) 213/105 -- -- 91 92 (!) 31 92 % --   10/15/19 0445 (!) 224/116 -- -- -- 82 8 93 % --   10/15/19 0440 (!) 224/116 -- -- 90 82 13 (!) 88 % --   10/15/19 0435 (!) 215/108 -- -- 80 82 (!) 44 95 % --   10/15/19 0430 (!) 193/97 -- -- 81 78 27 93 % --   10/15/19 0425 (!) 207/109 -- -- 75 76 25 94 % --   10/15/19 0420 -- -- -- -- 75 18 94 % --   10/15/19 0415 -- -- -- -- 73 18 92 % --   10/15/19 0410 -- -- -- -- 73 8 92 % --   10/15/19 0405 -- -- -- -- 74 21 93 % --   10/15/19 0400 (!) 198/90 -- -- 72 72 14 94 % --   10/15/19 0355 (!) 210/83 -- -- 69 70 10 92 % --   10/15/19 0350 -- -- -- -- -- -- 95 % --   10/15/19 0320 -- -- -- -- -- -- 92 % --   10/15/19 0315 (!) 194/93 -- -- -- -- -- 94 % --   10/15/19 0310 (!) 194/93 -- -- 68 -- -- 94 % --   10/15/19 0305 -- -- -- -- -- -- 95 % --    10/15/19 0300 -- -- -- 69 -- -- 95 % --   10/15/19 0255 -- -- -- -- -- -- 95 % --   10/15/19 0250 -- -- -- -- -- -- 95 % --   10/15/19 0245 (!) 187/103 -- -- 66 -- -- 96 % --     No intake/output data recorded.    Vitals:    10/15/19 0225 10/15/19 0645   Weight: 80 kg (176 lb 5.9 oz) 90.5 kg (199 lb 8.3 oz)       Current Weight: 90.5  Dry Weight: 88.5  Dialysis Temp: 36.5  C  Access Device: AVF  Access Site: left  Dialyzer: Revaclear  Dialysis Bath: 3  Sodium Profile: n  UF Goal: 2  Blood Flow Rate (mL/min): 400  Total Treatment Time (hrs): 2.5  Heparin: Low dose as required      EPO dose: y  Zemplar: n  IV Fe: n      Medications and Allergies:     Reviewed      Physical Exam:     Seen and examined during course of dialysis run    BP 98/63   Pulse 60   Temp 96.4  F (35.8  C) (Temporal)   Resp 23   Wt 90.5 kg (199 lb 8.3 oz)   SpO2 96%   BMI 27.83 kg/m      GENERAL: awake, alert, follows  HEENT: NC/AT, PERRLA, EOMI, non icteric, pharynx moist without lesion  RESP: clear  CV: RRR, normal S1 S2  ABDOMEN: S/NT, BS present  MS: no edema  EXT: access canulated without issue  NEURO: speech normal and cranial nerves 2-12 intact  PSYCH: affect normal/bright    Data:       Recent Labs   Lab 10/15/19  0305   *   POTASSIUM 4.5   CHLORIDE 95   CO2 24   ANIONGAP 11   *   *   CR 10.90*   GFRESTIMATED 4*   GFRESTBLACK 5*   PRABHA 9.4     Recent Labs   Lab 10/15/19  0305   HGB 9.7*         G David Fuller MD    [facility] - Nephrology  [phone]

## 2019-10-15 NOTE — H&P
[facility]  Hospitalist Admission Note  Name: Donald Raza    MRN: [id number]  YOB: 1950    Age: 69 year old  Date of admission: 10/15/2019  Primary care provider: Sukh Owen            Assessment and Plan:   Donald Raza is a 69 year old male with complicated medical history such as HIV stable on antiretroviral, CAD with prior PCI, ESRD on maintenance hemodialysis, (Tuesday Thursday, Saturday schedule), with last session last Saturday as scheduled, insulin requiring diabetes mellitus type 2, TIA, hypertension, dyslipidemia, chronic anemia, prior pacemaker insertion for bradycardia, numerous hospitalizations in the past for atypical chest pain and severe uncontrolled hypertension, prior documented intermittent noncompliance and sometimes missing hemodialysis sessions.  However Donald stated that he has been compliant with all these medications and dialysis sessions and he was in his usual state of health until few hours prior to this emergency room presentation when he started to complaint of not feeling well with accompanying diaphoresis, sensation of shortness of breath, chest discomfort and feeling bloated a few hours duration    1.  Hypertensive emergency/urgency  2.  High suspicion for pulmonary edema likely secondary to #1  3.  Detectable troponin I levels secondary to #1 in the setting of ESRD  4.  History of CAD with prior PCI  5.  Chronic thrombocytopenia  6.  Chronic anemia  7.  Insulin requiring diabetes mellitus, uncontrolled  8.  History of noncompliance  9.  Known HIV on HAART  10.  Dyslipidemia  11.  Prior pacemaker    Admit as inpatient he is high risk for clinical deterioration and will be requiring ICU care/management.  Continuation of nitroglycerin infusion in the setting of this hypertensive emergency.  I am suspecting that he is ongoing shortness of breath, wheezing likely brought about by cardiac wheezing, pulmonary edema in the setting of this elevated  blood pressure levels.  He is also a hemodialysis patient might be contributory in this setting.  However he mentioned that he finish the whole cycle of his hemodialysis session last Saturday.  Continue to trend troponin I levels.  On cardiac telemetry monitoring.  Requested echocardiogram.  Most recent echocardiogram showed preserved ejection fraction.  Low threshold for cardiology evaluation if with rising troponin levels.  As thrombocytopenia but no ongoing bleeding tendencies.  Remain on aspirin  Continue with his insulin home regimen.  High intensity insulin sliding scale ordered as well.  Resume his antiretroviral therapy once reconciled.  Formal nephrology evaluation requested for continuation of his hemodialysis  Current working diagnosis, plan of care, indication for hospitalization were discussed in detail with our patient and his brother who was present at bedside.  They were provided opportunity to ask questions that have answered the best my ability.    Medical Case also discussed with telemetry ICU service  Critical care time spent more than 40 minutes    Code status: full code  Admit to inpatient  Prophylaxis: PCD's  Disposition: Will be needing at least 2 inpatient hospitalization days          Chief Complaint:   Shortness of breath, diaphoresis, feeling unwell, elevated blood pressure levels       Source of Information:   Patient with good reliability  Discussion with ED physician  Review of E chart records         History of Present Illness:   Donald Raza is a 69 year old male with complicated medical history such as HIV stable on antiretroviral, CAD with prior PCI, ESRD on maintenance hemodialysis, (Tuesday Thursday, Saturday schedule), with last session last Saturday as scheduled, insulin requiring diabetes mellitus type 2, TIA, hypertension, dyslipidemia, chronic anemia, prior pacemaker insertion for bradycardia, numerous hospitalizations in the past for atypical chest pain and severe  uncontrolled hypertension, prior documented intermittent noncompliance and sometimes missing hemodialysis sessions.  However Donald stated that he has been compliant with all these medications and dialysis sessions and he was in his usual state of health until few hours prior to this emergency room presentation when he started to complaint of not feeling well with accompanying diaphoresis, sensation of shortness of breath, chest discomfort and feeling bloated.  He denies any vomiting spells, diarrhea, chills, coughing spells, abdominal pain, bleeding tendencies, back pain or fall event.  He felt likely had some fever but now he is saying most likely he was just sweaty and diaphoretic at the time.   No recent travel and no sick contacts.  Patient's brother was present at bedside stated that he was doing just fine the whole day yesterday until few hours prior to presentation here.   Further investigation showed it is anticipated elevated proBNP levels, detectable troponin I, EKG was reassuring, he is afebrile here with no leukocytosis, chest x-ray was pursued showed small patchy opacity.  Most prominent findings as he is uncontrolled blood pressure levels.  He was subsequently started on intravenous, continuous infusion of nitroglycerin and his case was referred to us for further management care hence this intensive care unit hospitalization.    Upon examination I found Pedro in  mild distress but still awake, alert, oriented and participatory.  His case is familiar to me and he stated that he is compliant with all his medications and dialysis schedule.  He is still complaining of shortness of breath and appears to be wheezing during exam.   Blood pressure remain elevated.          Past Medical History:     Past Medical History:   Diagnosis Date     Allergic rhinitis      Anemia due to chronic kidney disease 10/21/2011     CAD S/P percutaneous coronary angioplasty 6/15/2015     Cataract      CKD (chronic kidney disease)       Colon cancer (H)      COPD (chronic obstructive pulmonary disease) (H)      Depression      Dilated aortic root (H) 2016     Diverticulitis      ESRD (end stage renal disease) on dialysis (H) 2016     Gout      Human immunodeficiency virus (HIV) disease (H)      Hx of squamous cell carcinoma 2007     Hypertension      Impotence of organic origin      Increased prostate specific antigen (PSA) velocity 2016    Awaiting bx on blood thinner     Mixed hyperlipidemia      Pulmonary HTN (H)     Mod     TIA (transient ischemic attack) 2016     Type 2 diabetes mellitus (H) age 50             Past Surgical History:     Past Surgical History:   Procedure Laterality Date     ANGIOGRAM  16    No culprit lesions, stents widely patent      ANGIOGRAM  16    Cutting balloon ptca=Diag     APPENDECTOMY       CHOLECYSTECTOMY, LAPOROSCOPIC       COLON SURGERY       COLOSTOMY  1999    Temporary for diverticulitis     EP PACEMAKER N/A 2019    Procedure: EP Pacemaker;  Surgeon: Angelique Perera MD;  Location:  HEART CARDIAC CATH LAB      LEFT HEART CATHETERIZATION  2018    No significant change  Elevated LVEDp  LVEF 30% No PCI     HEART CATH, ANGIOPLASTY  16    LAD PCI. Stented with a 3.0 x 8 mm Xience Alpine stent.     IR DIALYSIS FISTULOGRAM LEFT  2019     STENT, CORONARY, S660 15/18  2015    VANITA=Diag, PTCA=LAD     STENT, CORONARY, S660 15/18  2015    VANITA=LAD             Social History:     Social History     Tobacco Use     Smoking status: Former Smoker     Packs/day: 2.00     Years: 41.00     Pack years: 82.00     Types: Cigarettes     Last attempt to quit: 2003     Years since quittin.7     Smokeless tobacco: Never Used   Substance Use Topics     Alcohol use: No     Alcohol/week: 0.0 standard drinks             Family History:   Family history was fully reviewed and non-contributory in this case.         Allergies:     Allergies    Allergen Reactions     Calcium Acetate Other (See Comments)     Other reaction(s): Other, see comments  Pain in chest and back  Pain in chest area (sensitive skin)      Contrast Dye Other (See Comments)     Contrast nephropathy     Diagnostic X-Ray Materials Other (See Comments)     PN: renal failure     Lisinopril      Sulfa Drugs              Medications:     Prior to Admission medications    Medication Sig Last Dose Taking? Auth Provider   abacavir (ZIAGEN) 300 MG tablet Take 600 mg by mouth every evening  10/15/2019 at Unknown time Yes Abstract, Provider   Acetaminophen (TYLENOL PO) Take 325 mg by mouth every 6 hours as needed for mild pain or fever    Unknown, Entered By History   albuterol (PROAIR HFA/PROVENTIL HFA/VENTOLIN HFA) 108 (90 BASE) MCG/ACT Inhaler Inhale 2 puffs into the lungs every 6 hours as needed for shortness of breath / dyspnea or wheezing   Nelson Worthy MD   amLODIPine (NORVASC) 5 MG tablet Take 1 tablet (5 mg) by mouth 2 times daily   Danny Azar MD   aspirin EC 81 MG EC tablet Take 1 tablet (81 mg) by mouth daily   Clemencia Pal MD   atorvastatin (LIPITOR) 40 MG tablet Take 40 mg by mouth At Bedtime   Unknown, Entered By History   B COMPLEX-C-FOLIC ACID PO Take 1 tablet by mouth At Bedtime    Reported, Patient   carvedilol (COREG) 25 MG tablet Take 1 tablet (25 mg) by mouth 2 times daily (with meals)   Danny Azar MD   chlorhexidine (CHLORHEXIDINE) 0.12 % solution Rinse and gargle 15 ml by mouth twice a day as directed.   Abstract, Provider   CLONAZEPAM PO Take 0.5 mg by mouth nightly as needed for anxiety (restless legs)   Unknown, Entered By History   dapsone 100 MG tablet Take 100 mg by mouth At Bedtime    Reported, Patient   Darunavir Ethanolate (PREZISTA PO) Take 800 mg by mouth At Bedtime.   Reported, Patient   dolutegravir (TIVICAY) 50 MG tablet Take 50 mg by mouth At Bedtime   Unknown, Entered By History   DOXERCALCIFEROL IV Inject 6 mcg into the  vein three times a week (with dialysis)   Reported, Patient   epoetin brittni (EPOGEN,PROCRIT) 30121 UNIT/ML injection Inject 11,000 Units Subcutaneous three times a week WITH DIALYSIS   Unknown, Entered By History   gabapentin (NEURONTIN) 300 MG capsule Take 300 mg by mouth as needed May take after HD.   Unknown, Entered By History   gabapentin (NEURONTIN) 300 MG capsule Take 300 mg by mouth 2 times daily    Unknown, Entered By History   guaiFENesin (MUCINEX) 600 MG 12 hr tablet Take 1,200 mg by mouth 2 times daily as needed    Unknown, Entered By History   hypromellose-dextran (ARTIFICAL TEARS) 0.1-0.3 % ophthalmic solution 1 drop daily as needed for dry eyes   Unknown, Entered By History   imiquimod (ALDARA) 5 % cream Apply topically as needed   Reported, Patient   insulin glargine (LANTUS PEN) 100 UNIT/ML pen Inject 18 Units Subcutaneous 2 times daily    Unknown, Entered By History   insulin lispro (HUMALOG PEN) 100 UNIT/ML pen Inject Subcutaneous 3 times daily (before meals) Takes 2 units for every 50 BG above 150.   Reported, Patient   insulin lispro (HUMALOG PEN) 100 UNIT/ML pen Inject 14 Units Subcutaneous 3 times daily (before meals)    Reported, Patient   ipratropium - albuterol 0.5 mg/2.5 mg/3 mL (DUONEB) 0.5-2.5 (3) MG/3ML neb solution Take 1 vial (3 mLs) by nebulization every 6 hours as needed for shortness of breath / dyspnea or wheezing   Catrachito Rosado DO   irbesartan (AVAPRO) 300 MG tablet Take 1 tablet (300 mg) by mouth At Bedtime   Clemencia Pal MD   iron sucrose (VENOFER) 20 MG/ML injection Inject 50 mg into the vein once a week WIth dialysis   Unknown, Entered By History   Isosorbide Mononitrate  MG TB24 Take 1 tablet (120 mg) by mouth every evening   Yuki Hinojosa APRN CNP   ketoconazole (NIZORAL) 2 % cream Apply topically daily    Reported, Patient   MAGNESIUM OXIDE PO Take 400 mg by mouth At Bedtime   Unknown, Entered By History   melatonin 5 MG tablet Take 5 mg by mouth At  "Bedtime   Reported, Patient   mirtazapine (REMERON) 15 MG tablet Take 15 mg by mouth At Bedtime   Reported, Patient   nitroglycerin (NITROSTAT) 0.4 MG SL tablet One tablet under the tongue every 5 minutes if needed for chest pain. May repeat every 5 minutes for a maximum of 3 doses in 15 minutes\"   Lay Paz MD   Nutritional Supplements (NEPRO PO) Take 1 capsule by mouth 3 times daily 1-3 times daily   Unknown, Entered By History   omega-3 acid ethyl esters (LOVAZA) 1 G capsule Take 2 g by mouth 2 times daily   Unknown, Entered By History   pantoprazole (PROTONIX) 40 MG EC tablet Take 40 mg by mouth daily   Unknown, Entered By History   polyethylene glycol (MIRALAX/GLYCOLAX) packet Take 1 packet by mouth daily as needed for constipation   Reported, Patient   ritonavir (NORVIR) 100 MG capsule Take 1 capsule by mouth At Bedtime    Reported, Patient   sucroferric oxyhydroxide (VELPHORO) 500 MG CHEW chewable tablet Take 500 mg by mouth 3 times daily (with meals)   Unknown, Entered By History             Review of Systems:   A Comprehensive greater than 10 system review of systems was carried out.  Pertinent positives and negatives are noted above.  Otherwise negative for contributory information.           Physical Exam:   Blood pressure (!) 203/152, pulse 111, temperature 97.9  F (36.6  C), temperature source Oral, resp. rate 19, weight 80 kg (176 lb 5.9 oz), SpO2 92 %.  Wt Readings from Last 1 Encounters:   10/15/19 80 kg (176 lb 5.9 oz)     Exam:  GENERAL: In mild distress awake, alert, and fully oriented.  HEENT: Normocephalic, atraumatic. Extraocular movements intact.  CARDIOVASCULAR: Tachycardic rate, normal rhythm S1 and S2, palpable pacemaker, nonpitting edema bilateral lower extremities  PULMONARY: Fair air entry, bilateral scattered wheezes, mild inspiratory crackles in both bases  ABDOMINAL: Soft, non-tender, non-distended. Bowel sounds normoactive. No hepatosplenomegaly.  EXTREMITIES: No cyanosis or " clubbing.  Left upper extremity dialysis access site  NEUROLOGICAL: CN 2-12 grossly intact, awake and alert x3, spontaneous and coherent speech. no focal neurological deficits.  DERMATOLOGICAL: Some bruises, skin excoriations  Psych: not agitation, not combative, pleasant mood  Lymph nodes: no obvious palpable  cervical or axillary lymphadenopathy         Data:   EKG:  EKG reviewed showed normal sinus rhythm with first-degree AV block, nonspecific ST changes,  Imaging:  Results for orders placed or performed during the hospital encounter of 10/15/19   XR Chest 2 Views    Narrative    CHEST 2 VIEWS   10/15/2019 3:27 AM     HISTORY: Shortness of breath.    COMPARISON: 9/24/2018.    FINDINGS: Hypoinflated lungs. A few small patchy opacities are present  in the central aspect of the left lung. The lungs are otherwise clear.  The size of the cardiac silhouette is within normal limits.  Atherosclerotic calcification in the thoracic aorta. Right anterior  chest wall cardiac device with lead tips in the right atrium and  ventricle. A vascular stent is present in the left axillary region.      Impression    IMPRESSION:  1. A few small patchy opacities in the central aspect of the left  lung, possibly infectious or inflammatory in etiology or representing  atelectasis.  2. No other findings suspicious for active cardiopulmonary disease.    EN KINSEY MD       Labs:  No results for input(s): CULT in the last 168 hours.  Recent Labs   Lab 10/15/19  0305   *   POTASSIUM 4.5   CHLORIDE 95   CO2 24   ANIONGAP 11   *   *   CR 10.90*   GFRESTIMATED 4*   GFRESTBLACK 5*   PRABHA 9.4     Recent Labs   Lab 10/15/19  0305   WBC 5.2   HGB 9.7*   HCT 31.4*   MCV 94   *     No results for input(s): SED, CRP in the last 168 hours.  Recent Labs   Lab 10/15/19  0305   *     No results for input(s): INR in the last 168 hours.  No results for input(s): LIPASE in the last 168 hours.  No results for input(s):  CHOL, HDL, LDL, TRIG, CHOLHDLRATIO in the last 168 hours.  Recent Labs   Lab 10/15/19  0305   TROPI 0.046*     No results for input(s): COLOR, APPEARANCE, URINEGLC, URINEBILI, URINEKETONE, SG, UBLD, URINEPH, PROTEIN, UROBILINOGEN, NITRITE, LEUKEST, RBCU, WBCU in the last 168 hours.

## 2019-10-15 NOTE — ED TRIAGE NOTES
"Pt aox4, abcs intact. Pt arrives c/o fever, achy, and a headache. Pt states that last night he felt feverish and he woke up and he \"couldn't even move, I am very week, and my balance is terrible.\"   "

## 2019-10-15 NOTE — PLAN OF CARE
ICU End of Shift Summary.  For vital signs and complete assessments, please see documentation flowsheets.     Pertinent assessments: BP's variable, all other VSS, BM this shift, HD today, Tele SR w/1st degree AVB, paced at times.  Moves with A1-2 and a walker.  Oriented x4.    Major Shift Events: Weaned nitroglycerin gtt, ECHO completed, weaned off O2, Nephrology to see  Plan (Upcoming Events): Continue to wean gtt, next dialysis run Thursday  Discharge/Transfer Needs: Possible transfer out of unit once weaned off nitroglycerin gtt    Bedside Shift Report Completed : Yes  Bedside Safety Check Completed: Yes

## 2019-10-15 NOTE — PROGRESS NOTES
Potassium   Date Value Ref Range Status   10/15/2019 4.5 3.4 - 5.3 mmol/L Final     Hemoglobin   Date Value Ref Range Status   10/15/2019 9.7 (L) 13.3 - 17.7 g/dL Final     Creatinine   Date Value Ref Range Status   10/15/2019 10.90 (H) 0.66 - 1.25 mg/dL Final     Urea Nitrogen   Date Value Ref Range Status   10/15/2019 100 (H) 7 - 30 mg/dL Final     Sodium   Date Value Ref Range Status   10/15/2019 130 (L) 133 - 144 mmol/L Final     INR   Date Value Ref Range Status   05/13/2019 1.10 0.86 - 1.14 Final       DIALYSIS PROCEDURE NOTE  Hepatitis status of previous patient on machine log was checked and verified ok to use with this patients hepatitis status.  Patient dialyzed for 2.5 hrs. on a 3 K bath with a net fluid removal of  2 L.  A BFR of 400 ml/min was obtained via a LAVF using 15 gauge needles.    Dr. Reed consulted via phone with the patient during treatment.  Total heparin received during the treatment: 0 units.   Needle cannulation sites held x 10 min, pressure dressings applied after hemostasis.    Meds  Given: Epogen Complications: none.  Procedure/ESRD education provided orally to patient, patient verbalized/demonstrated understanding.  ICEBOAT? Timeout performed pre-treatment  I: Patient was identified using 2 identifiers  C: Consent obtained/verified current before treatment  E: Equipment preventative maintenance is current and dialysis delivery system OK to use  B: Hepatitis B Surface Antigen: non-reactive; Draw Date: 1/29/19      Hepatitis B Surface Antibody: immune; Draw Date: 1/29/19  O: Dialysis orders present and complete prior to treatment  A: Vascular access verified and assessed prior to treatment  T: Treatment was performed at a clinically appropriate time  ?: Patient was allowed to ask questions and address concerns prior to treatment  See flowsheet in EPIC for further details and post assessment.  Machine water alarm in place and functioning. Transducer pods intact and checked every  15min.  Pt dialyzed in ICU.  Report received from: ROBERT Herring RN  Report given to: ROBERT Herring RN  Chlorine/Chloramine water system checked every 4 hours.  Outpatient Dialysis at [facility].

## 2019-10-15 NOTE — PROGRESS NOTES
[facility]  Patient is currently open to home care services with [facility].  The patient is currently receiving RN PT OT [facility] services.  [facility]  and team have been notified of patient admission.  [facility] liaison will continue to follow patient during stay.  If appropriate provide orders to resume home care at time of discharge.

## 2019-10-15 NOTE — CONSULTS
Care Coordination;  CTS identifies patient as high risk due to elevated MUSHTAQ score. Currently admitted for Hypertensive Crisis. Patient has multiple comorbidities including: HIV, CAD,DM, ESRD.  He has hemodialysis T-TH-S. He is on greater than 10 medications including Humalog and Lantus. This is his 4 admission in 6 months and 7 total ER visits in 6 months. Per chart review, pt resides at home with spouse. Baseline mobility is independent but the last week started using a walker.. Patient is currently receiving PT/OT/SW/RN through [facility].     Will follow along with SW for DC planning. Will give hand off to clinic RN CTS at time of discharge and assist in making follow up appointments.      Gail Booth RN, BSN, CTS  [facility]  [phone]

## 2019-10-15 NOTE — CONSULTS
RENAL CONSULTATION NOTE      REFERRING MD:  Bria    REASON FOR CONSULTATION:  ESRD Management      A/P:     Admitted with CP, SOB, hypertensive urgency and fevef      1.  ESRD   -[facility]   -Dr. Dominik Chavez   -AVF   -target weight 88.5   -off 10/12/19 at 90.2 kg  2.  Anemia   -MIHAI  3.  Mineral Bone Disease   -Hectorol   -last PTH 09/14/19 583 pg/ml  4.  Hypertensive urgency   -BP improved with UF      Continue [facility] dialysis schedule  UF to below target weight as tolerated    Continue MIHAI and Hectorol        HPI:     Presented to ER with CP and SOB.  BP > 200    Treated with nitroglycerine, furosemide, labetalol IV and given oral amlodipine and  carvedilol       Currently on nitroglycerine gtt   range    Comfortable at this time.  No further SOB    Dialyzed last 10/12/19  Off at 90.2 kg (target 88.5 kg)    No recent access issues         ROS:      A complete 10 point review of systems was performed and is negative except as noted above.    PMH:      Past Medical History:   Diagnosis Date     Allergic rhinitis      Anemia due to chronic kidney disease 10/21/2011     CAD S/P percutaneous coronary angioplasty 6/15/2015     Cataract      CKD (chronic kidney disease)      Colon cancer (H)      COPD (chronic obstructive pulmonary disease) (H)      Depression      Dilated aortic root (H) 5/6/2016     Diverticulitis      ESRD (end stage renal disease) on dialysis (H) 5/6/2016     Gout      Human immunodeficiency virus (HIV) disease (H)      Hx of squamous cell carcinoma 03/23/2007     Hypertension 2010     Impotence of organic origin      Increased prostate specific antigen (PSA) velocity 08/08/2016    Awaiting bx on blood thinner     Mixed hyperlipidemia      Pulmonary HTN (H)     Mod     TIA (transient ischemic attack) 5/2016     Type 2 diabetes mellitus (H) age 50       PSH:      Past Surgical History:   Procedure Laterality Date     ANGIOGRAM  03-04-16    No culprit lesions, stents widely patent       ANGIOGRAM  16    Cutting balloon ptca=Diag     APPENDECTOMY       CHOLECYSTECTOMY, LAPOROSCOPIC       COLON SURGERY       COLOSTOMY  1999    Temporary for diverticulitis     EP PACEMAKER N/A 2019    Procedure: EP Pacemaker;  Surgeon: Angelique Perera MD;  Location:  HEART CARDIAC CATH LAB      LEFT HEART CATHETERIZATION  2018    No significant change  Elevated LVEDp  LVEF 30% No PCI     HEART CATH, ANGIOPLASTY  16    LAD PCI. Stented with a 3.0 x 8 mm Xience Alpine stent.     IR DIALYSIS FISTULOGRAM LEFT  2019     STENT, CORONARY, S660 15/18  2015    VANITA=Diag, PTCA=LAD     STENT, CORONARY, S660 15/18  2015    VANITA=LAD       MEDICATIONS:      No current outpatient medications on file.       ALLERGIES:      Allergies as of 10/15/2019 - Reviewed 10/15/2019   Allergen Reaction Noted     Calcium acetate Other (See Comments) 2017     Contrast dye Other (See Comments) 2014     Diagnostic x-ray materials Other (See Comments) 2012     Lisinopril  2012     Sulfa drugs  2012       FH:      Family History   Problem Relation Age of Onset     Heart Disease Brother 40        CABG     Kidney Disease Sister      Hypertension Sister      Heart Disease Brother         Dilated aorta       SH:      Social History     Socioeconomic History     Marital status:      Spouse name: Not on file     Number of children: Not on file     Years of education: Not on file     Highest education level: Not on file   Occupational History     Not on file   Social Needs     Financial resource strain: Not on file     Food insecurity:     Worry: Not on file     Inability: Not on file     Transportation needs:     Medical: Not on file     Non-medical: Not on file   Tobacco Use     Smoking status: Former Smoker     Packs/day: 2.00     Years: 41.00     Pack years: 82.00     Types: Cigarettes     Last attempt to quit:      Years since quittin.7      Smokeless tobacco: Never Used   Substance and Sexual Activity     Alcohol use: No     Alcohol/week: 0.0 standard drinks     Drug use: No     Sexual activity: Not on file   Lifestyle     Physical activity:     Days per week: Not on file     Minutes per session: Not on file     Stress: Not on file   Relationships     Social connections:     Talks on phone: Not on file     Gets together: Not on file     Attends [employer] service: Not on file     Active member of club or organization: Not on file     Attends meetings of clubs or organizations: Not on file     Relationship status: Not on file     Intimate partner violence:     Fear of current or ex partner: Not on file     Emotionally abused: Not on file     Physically abused: Not on file     Forced sexual activity: Not on file   Other Topics Concern     Parent/sibling w/ CABG, MI or angioplasty before 65F 55M? Yes      Service Not Asked     Blood Transfusions Not Asked     Caffeine Concern Yes     Comment: 2 cups daily     Occupational Exposure Not Asked     Hobby Hazards Not Asked     Sleep Concern Yes     Stress Concern Not Asked     Weight Concern Not Asked     Special Diet No     Comment:  more proteins     Back Care Not Asked     Exercise Yes     Comment: Cardiac rehab      Bike Helmet Not Asked     Seat Belt Not Asked     Self-Exams Not Asked   Social History Narrative     Not on file       PHYSICAL EXAM:      Vitals were reviewed  Patient Vitals for the past 8 hrs:   BP Temp Temp src Pulse Heart Rate Resp SpO2 Weight   10/15/19 1030 98/63 -- -- -- 60 -- 96 % --   10/15/19 1015 114/66 -- -- 60 60 -- 100 % --   10/15/19 1000 109/65 -- -- 60 60 -- 99 % --   10/15/19 0945 95/60 -- -- 60 60 -- 100 % --   10/15/19 0930 116/62 -- -- 60 60 -- 100 % --   10/15/19 0915 111/62 -- -- 60 60 -- 100 % --   10/15/19 0900 130/73 -- -- 63 60 -- 99 % --   10/15/19 0845 135/80 -- -- 60 60 -- 98 % --   10/15/19 0830 (!) 152/118 -- -- 61 61 -- 96 % --   10/15/19 0815 (!)  159/82 -- -- 62 63 -- 99 % --   10/15/19 0800 (!) 164/83 -- -- 67 64 -- 98 % --   10/15/19 0745 (!) 170/87 -- -- 61 62 -- 99 % --   10/15/19 0700 (!) 186/92 -- -- 66 65 -- 98 % --   10/15/19 0645 (!) 172/81 -- -- 65 67 -- -- 90.5 kg (199 lb 8.3 oz)   10/15/19 0635 (!) 179/95 97.2  F (36.2  C) Temporal 72 72 -- -- --   10/15/19 0620 (!) 177/80 -- -- 66 68 23 95 % --   10/15/19 0615 (!) 178/106 -- -- 66 66 21 94 % --   10/15/19 0613 -- -- -- -- 67 17 94 % --   10/15/19 0612 -- -- -- -- 68 9 94 % --   10/15/19 0611 -- -- -- -- 68 13 94 % --   10/15/19 0610 (!) 175/86 -- -- 66 70 15 93 % --   10/15/19 0609 -- -- -- -- 69 12 93 % --   10/15/19 0604 (!) 165/83 -- -- 68 65 12 93 % --   10/15/19 0601 -- -- -- -- 61 20 94 % --   10/15/19 0600 (!) 191/99 -- -- -- 63 -- 94 % --   10/15/19 0559 -- -- -- -- 61 21 93 % --   10/15/19 0558 (!) 182/91 -- -- 66 64 15 93 % --   10/15/19 0557 -- -- -- -- 66 24 94 % --   10/15/19 0556 -- -- -- -- 64 26 94 % --   10/15/19 0555 (!) 191/99 -- -- 66 67 29 93 % --   10/15/19 0554 -- -- -- -- 66 16 93 % --   10/15/19 0553 -- -- -- -- 69 23 92 % --   10/15/19 0552 -- -- -- -- 75 19 94 % --   10/15/19 0551 -- -- -- -- 79 21 94 % --   10/15/19 0550 -- -- -- -- 77 25 94 % --   10/15/19 0549 -- -- -- -- 76 23 94 % --   10/15/19 0548 -- -- -- -- 77 21 93 % --   10/15/19 0547 -- -- -- -- 79 22 92 % --   10/15/19 0546 (!) 204/109 -- -- 79 -- -- 90 % --   10/15/19 0545 (!) 204/109 -- -- 79 80 20 (!) 89 % --   10/15/19 0542 -- -- -- -- 80 27 98 % --   10/15/19 0541 -- -- -- -- 80 20 98 % --   10/15/19 0540 (!) 217/104 -- -- 80 81 24 97 % --   10/15/19 0539 -- -- -- -- 85 (!) 32 95 % --   10/15/19 0538 -- -- -- -- 80 17 97 % --   10/15/19 0537 -- -- -- -- 84 23 96 % --   10/15/19 0536 -- -- -- -- 84 24 96 % --   10/15/19 0535 (!) 225/115 -- -- 80 81 21 96 % --   10/15/19 0534 -- -- -- -- 84 22 95 % --   10/15/19 0533 -- -- -- -- 82 25 96 % --   10/15/19 0532 (!) 226/115 -- -- 83 82 24 96 % --   10/15/19  0531 -- -- -- -- 81 20 96 % --   10/15/19 0530 (!) 226/115 -- -- -- 84 21 96 % --   10/15/19 0529 -- -- -- -- 86 18 96 % --   10/15/19 0528 -- -- -- -- 83 22 96 % --   10/15/19 0527 -- -- -- 82 85 27 95 % --   10/15/19 0526 -- -- -- -- 81 24 95 % --   10/15/19 0525 -- -- -- -- 83 28 96 % --   10/15/19 0524 -- -- -- -- 81 (!) 39 96 % --   10/15/19 0523 -- -- -- -- 82 23 95 % --   10/15/19 0522 -- -- -- -- 86 24 99 % --   10/15/19 0521 -- -- -- -- 86 28 98 % --   10/15/19 0520 (!) 241/124 -- -- 84 81 24 97 % --   10/15/19 0519 -- -- -- -- 85 22 98 % --   10/15/19 0518 -- -- -- -- 85 16 98 % --   10/15/19 0517 -- -- -- -- 84 23 98 % --   10/15/19 0516 -- -- -- -- 85 26 97 % --   10/15/19 0515 (!) 242/122 -- -- 88 88 22 98 % --   10/15/19 0514 -- -- -- -- 88 16 98 % --   10/15/19 0513 -- -- -- -- 89 24 98 % --   10/15/19 0512 -- -- -- -- 86 26 98 % --   10/15/19 0511 -- -- -- -- 86 21 97 % --   10/15/19 0510 (!) 256/126 -- -- 88 90 17 97 % --   10/15/19 0509 -- -- -- -- 93 22 98 % --   10/15/19 0508 -- -- -- -- 93 (!) 45 98 % --   10/15/19 0507 (!) 250/133 -- -- 91 98 23 98 % --   10/15/19 0506 -- -- -- -- 99 23 97 % --   10/15/19 0505 (!) 250/128 -- -- 97 102 22 93 % --   10/15/19 0504 -- -- -- -- 105 22 93 % --   10/15/19 0503 -- -- -- -- 110 22 93 % --   10/15/19 0502 -- -- -- -- 108 19 93 % --   10/15/19 0501 -- -- -- -- 109 27 91 % --   10/15/19 0500 (!) 203/152 -- -- 111 108 19 92 % --   10/15/19 0455 -- -- -- -- 101 27 90 % --   10/15/19 0450 (!) 213/105 -- -- 91 92 (!) 31 92 % --   10/15/19 0445 (!) 224/116 -- -- -- 82 8 93 % --   10/15/19 0440 (!) 224/116 -- -- 90 82 13 (!) 88 % --   10/15/19 0435 (!) 215/108 -- -- 80 82 (!) 44 95 % --   10/15/19 0430 (!) 193/97 -- -- 81 78 27 93 % --   10/15/19 0425 (!) 207/109 -- -- 75 76 25 94 % --   10/15/19 0420 -- -- -- -- 75 18 94 % --   10/15/19 0415 -- -- -- -- 73 18 92 % --   10/15/19 0410 -- -- -- -- 73 8 92 % --   10/15/19 0405 -- -- -- -- 74 21 93 % --   10/15/19  0400 (!) 198/90 -- -- 72 72 14 94 % --   10/15/19 0355 (!) 210/83 -- -- 69 70 10 92 % --   10/15/19 0350 -- -- -- -- -- -- 95 % --   10/15/19 0320 -- -- -- -- -- -- 92 % --   10/15/19 0315 (!) 194/93 -- -- -- -- -- 94 % --   10/15/19 0310 (!) 194/93 -- -- 68 -- -- 94 % --   10/15/19 0305 -- -- -- -- -- -- 95 % --   10/15/19 0300 -- -- -- 69 -- -- 95 % --   10/15/19 0255 -- -- -- -- -- -- 95 % --   10/15/19 0250 -- -- -- -- -- -- 95 % --   10/15/19 0245 (!) 187/103 -- -- 66 -- -- 96 % --   10/15/19 0240 -- -- -- -- -- -- 97 % --   10/15/19 0235 -- -- -- -- -- -- 93 % --     No intake/output data recorded.      Vitals:    10/15/19 0225 10/15/19 0645   Weight: 80 kg (176 lb 5.9 oz) 90.5 kg (199 lb 8.3 oz)         GENERAL: awake, alert, follows  HEENT: NC/AT, PERRLA, EOMI, non icteric, pharynx moist without lesion  NECK: supple, no masses or adenopathy  RESP:  clear anteriorly  CV: RRR, normal S1 S2  ABDOMEN: soft, nontender, no HSM or masses and bowel sounds normal  MS: no clubbing, cyanosis   SKIN: clear without significant rashes or lesions  NEURO: speech normal and cranial nerves 2-12 intact  PSYCH: affect normal/bright  EXT: warm, no edema      LABS:        Recent Labs   Lab 10/15/19  0305   *   POTASSIUM 4.5   CHLORIDE 95   CO2 24   ANIONGAP 11   *   *   CR 10.90*   GFRESTIMATED 4*   GFRESTBLACK 5*   PRABHA 9.4     Recent Labs   Lab Test 10/15/19  0305 09/25/19  0955 09/24/19  0835 08/08/19  1006 07/27/19  0849 07/26/19  0622 07/26/19  0020 05/15/19  0738 05/14/19  0528 05/13/19  0600   CR 10.90* 9.31* 12.10* 4.73* 7.88* 9.49* 9.07* 6.23* 7.41* 9.60*     No lab results found in last 7 days.  Recent Labs   Lab 10/15/19  0305   HGB 9.7*       DIAGNOSTICS:  Reviewed      JAMAL Fuller    [facility]  [phone]

## 2019-10-15 NOTE — ED PROVIDER NOTES
History     Chief Complaint:  Fever    HPI   Donald Raza is a 69 year old male with a complex past medical history including hypertension, hyperlipidemia, diabetes, HIV, AIDS, and end stage renal disease on dialysis (Tu/Th/Sat, last on 10/12) who presents to the emergency department today via EMS for evaluation of a fever. The patient reports four days of myalgias, difficulty sleeping, a subjective fever, fatigue, and shortness of breath. He furthers that he woke from sleep this morning with increasing weakness and difficulty with balance from weakness, prompting a call to EMS.  Here the patient notes that he has had difficulty presenting to appointments due to fatigue, weakness, and a broken phone as of recent. He adds that he has been experiencing 3-4 weeks of right sided chest tightness, as well as constipation as of recent, with his last bowel movement occurring three days ago. He denies jaw pain, arm pain, cough, abdominal pain, diarrhea, leg swelling, weight gain, use of analgesics, or missed doses of his HIV medication. He does state he has missed his night time meds yesterday because of his weakness.  Of note, the patient ambulates independently at baseline, however he has been utilizing a walker over the last week.     The patient states his CD4 count was last checked 3-4 months ago and his viral load was last checked six months ago ([facility]).    Allergies:  Calcium acetate  Contrast dye  Diagnostic xray materials  Lisinopril  Sulfa drugs     Medications:    Ziagen  Albuterol  Norvasc  Aspirin  Lipitor  Coreg  Clonazepam  Dapson  Prezista  Tivicay  Doxercalciferol  Epogen  Neurontin  Lantus  Humalog  Duoneb  Abapro  Venofer  Isosorbide mononitrate  Magnesium oxide  Remeron  Nitrostat  Nepro  Lovaza  Protonix  Norvir  Velphoro    Past Medical History:    Allergic rhinitis  Anemia due to chronic kidney disease  Coronary artery disease  Cataract  Chronic kidney disease  Colon cancer  Chronic  obstructive pulmonary disease  Depression  Dilated aortic root  Diverticulitis  End stage renal disease on dialysis  Gout  Human immunodeficiency virus disease  Squamous cell carcinoma  Hypertension   Impotence of organic origin  Elevated prostate specific antigen velocity  Hyperlipidemia  Pulmonary hypertension  Transient ischemic attack  Type II diabetes  Acquired immune deficiency syndrome  Bilateral pneumonia  Huang disease  Renal insufficiency  Unstable angina  Bradycardia  Dialysis AV fistula malfunction  Bronchitis  Acute respiratory failure with hypoxia  Acute pulmonary edema  Cerebral hypoperfusion  Expressive aphasia  Sepsis  Hypertensive urgency    Past Surgical History:    Angiogram x2  Appendectomy  Laparoscopic cholecystectomy  Colostomy  EP pacemaker  Left heart catheterization  Heart catheterization, angioplasty  Dialysis fistulogram left  Coronary stent x2    Family History:    Brother: heart disease  Sister: kidney disease, hypertension    Social History:  The patient was accompanied to the ED by his brother.  Smoking Status: Former Smoker   Packs per day: 2   Years: 41   Types: Cigarettes  Smokeless Tobacco: Never Used  Alcohol Use: Negative  Drug Use: Negative  PCP: Sukh Owen  Marital Status:        Review of Systems   Constitutional: Positive for fatigue and fever. Negative for unexpected weight change.   HENT:        No jaw pain   Respiratory: Positive for shortness of breath. Negative for cough.    Cardiovascular: Negative for chest pain and leg swelling.   Gastrointestinal: Positive for constipation. Negative for abdominal pain and diarrhea.   Musculoskeletal: Positive for gait problem and myalgias.        No arm pain   Neurological: Positive for weakness.   Psychiatric/Behavioral: Positive for sleep disturbance.   All other systems reviewed and are negative.      Physical Exam     Patient Vitals for the past 24 hrs:   BP Temp Temp src Pulse Heart Rate Resp SpO2 Weight    10/15/19 0604 (!) 165/83 -- -- 68 65 12 93 % --   10/15/19 0601 -- -- -- -- 61 20 94 % --   10/15/19 0600 (!) 191/99 -- -- -- 63 -- 94 % --   10/15/19 0559 -- -- -- -- 61 21 93 % --   10/15/19 0558 (!) 182/91 -- -- 66 64 15 93 % --   10/15/19 0557 -- -- -- -- 66 24 94 % --   10/15/19 0556 -- -- -- -- 64 26 94 % --   10/15/19 0555 (!) 191/99 -- -- 66 67 29 93 % --   10/15/19 0554 -- -- -- -- 66 16 93 % --   10/15/19 0553 -- -- -- -- 69 23 92 % --   10/15/19 0552 -- -- -- -- 75 19 94 % --   10/15/19 0551 -- -- -- -- 79 21 94 % --   10/15/19 0550 -- -- -- -- 77 25 94 % --   10/15/19 0549 -- -- -- -- 76 23 94 % --   10/15/19 0548 -- -- -- -- 77 21 93 % --   10/15/19 0547 -- -- -- -- 79 22 92 % --   10/15/19 0546 (!) 204/109 -- -- 79 -- -- 90 % --   10/15/19 0545 (!) 204/109 -- -- 79 80 20 (!) 89 % --   10/15/19 0542 -- -- -- -- 80 27 98 % --   10/15/19 0541 -- -- -- -- 80 20 98 % --   10/15/19 0540 (!) 217/104 -- -- 80 81 24 97 % --   10/15/19 0539 -- -- -- -- 85 (!) 32 95 % --   10/15/19 0538 -- -- -- -- 80 17 97 % --   10/15/19 0537 -- -- -- -- 84 23 96 % --   10/15/19 0536 -- -- -- -- 84 24 96 % --   10/15/19 0535 (!) 225/115 -- -- 80 81 21 96 % --   10/15/19 0534 -- -- -- -- 84 22 95 % --   10/15/19 0533 -- -- -- -- 82 25 96 % --   10/15/19 0532 (!) 226/115 -- -- 83 82 24 96 % --   10/15/19 0531 -- -- -- -- 81 20 96 % --   10/15/19 0530 ([place]) 226/115 -- -- -- 84 21 96 % --   10/15/19 0529 -- -- -- -- 86 18 96 % --   10/15/19 0528 -- -- -- -- 83 22 96 % --   10/15/19 0527 -- -- -- 82 85 27 95 % --   10/15/19 0526 -- -- -- -- 81 24 95 % --   10/15/19 0525 -- -- -- -- 83 28 96 % --   10/15/19 0524 -- -- -- -- 81 (!) 39 96 % --   10/15/19 0523 -- -- -- -- 82 23 95 % --   10/15/19 0522 -- -- -- -- 86 24 99 % --   10/15/19 0521 -- -- -- -- 86 28 98 % --   10/15/19 0520 (!) 241/124 -- -- 84 81 24 97 % --   10/15/19 0519 -- -- -- -- 85 22 98 % --   10/15/19 0518 -- -- -- -- 85 16 98 % --   10/15/19 0517 -- -- -- -- 84 23 98  % --   10/15/19 0516 -- -- -- -- 85 26 97 % --   10/15/19 0515 (!) 242/122 -- -- 88 88 22 98 % --   10/15/19 0514 -- -- -- -- 88 16 98 % --   10/15/19 0513 -- -- -- -- 89 24 98 % --   10/15/19 0512 -- -- -- -- 86 26 98 % --   10/15/19 0511 -- -- -- -- 86 21 97 % --   10/15/19 0510 (!) 256/126 -- -- 88 90 17 97 % --   10/15/19 0509 -- -- -- -- 93 22 98 % --   10/15/19 0508 -- -- -- -- 93 (!) 45 98 % --   10/15/19 0507 (!) 250/133 -- -- 91 98 23 98 % --   10/15/19 0506 -- -- -- -- 99 23 97 % --   10/15/19 0505 (!) 250/128 -- -- 97 102 22 93 % --   10/15/19 0504 -- -- -- -- 105 22 93 % --   10/15/19 0503 -- -- -- -- 110 22 93 % --   10/15/19 0502 -- -- -- -- 108 19 93 % --   10/15/19 0501 -- -- -- -- 109 27 91 % --   10/15/19 0500 (!) 203/152 -- -- 111 108 19 92 % --   10/15/19 0455 -- -- -- -- 101 27 90 % --   10/15/19 0450 (!) 213/105 -- -- 91 92 (!) 31 92 % --   10/15/19 0445 (!) 224/116 -- -- -- 82 8 93 % --   10/15/19 0440 (!) 224/116 -- -- 90 82 13 (!) 88 % --   10/15/19 0435 (!) 215/108 -- -- 80 82 (!) 44 95 % --   10/15/19 0430 (!) 193/97 -- -- 81 78 27 93 % --   10/15/19 0425 (!) 207/109 -- -- 75 76 25 94 % --   10/15/19 0420 -- -- -- -- 75 18 94 % --   10/15/19 0415 -- -- -- -- 73 18 92 % --   10/15/19 0410 -- -- -- -- 73 8 92 % --   10/15/19 0405 -- -- -- -- 74 21 93 % --   10/15/19 0400 (!) 198/90 -- -- 72 72 14 94 % --   10/15/19 0355 (!) 210/83 -- -- 69 70 10 92 % --   10/15/19 0350 -- -- -- -- -- -- 95 % --   10/15/19 0320 -- -- -- -- -- -- 92 % --   10/15/19 0315 (!) 194/93 -- -- -- -- -- 94 % --   10/15/19 0310 (!) 194/93 -- -- 68 -- -- 94 % --   10/15/19 0305 -- -- -- -- -- -- 95 % --   10/15/19 0300 -- -- -- 69 -- -- 95 % --   10/15/19 0255 -- -- -- -- -- -- 95 % --   10/15/19 0250 -- -- -- -- -- -- 95 % --   10/15/19 0245 (!) 187/103 -- -- 66 -- -- 96 % --   10/15/19 0240 -- -- -- -- -- -- 97 % --   10/15/19 0235 -- -- -- -- -- -- 93 % --   10/15/19 0230 (!) 195/86 -- -- 65 -- -- 94 % --   10/15/19  0229 (!) 196/88 -- -- -- -- -- -- --   10/15/19 0227 (!) 209/96 -- -- -- -- -- -- --   10/15/19 0225 (!) 209/96 97.9  F (36.6  C) Oral 66 70 16 97 % 80 kg (176 lb 5.9 oz)     Physical Exam  General: Alert, no acute distress; nontoxic appearing  Neuro:  PERRL.  EOMI.  No focal deficits; moving all extremities  HEENT:  Moist mucous membranes.  Conjunctiva normal.   CV:  RRR, no m/r/g, skin warm and well perfused  Pulm:  Diffuse rales bilaterally. No wheezing; No acute distress, breathing comfortably  GI:  Soft, nontender, nondistended.  No rebound or guarding.  Normal bowel sounds  MSK:  Moving all extremities.  No focal areas of edema, erythema, or tenderness; left dialysis fistula with palpable thrill  Skin:  WWP, no rashes, 1+ lower extremity edema, skin color normal, no diaphoresis  Psych:  Well-appearing, normal affect, regular speech    Emergency Department Course     ECG:  ECG taken at 0309, ECG read at 0313  Sinus rhythm with first degree AV block  Nonspecific ST abnormality  Abnormal QRS-T angle, consider primary T wave abnormality  Abnormal ECG  No significant change compared to EKG dated 9/24/19  Rate 67 bpm. IL interval 216 ms. QRS duration 90 ms. QT/QTc 406/429 ms. P-R-T axes 43 57 125.    Imaging:  Radiology findings were communicated with the patient who voiced understanding of the findings.    XR Chest 2 Views  1. A few small patchy opacities in the central aspect of the left lung, possibly infectious or inflammatory in etiology or representing atelectasis.  2. No other findings suspicious for active cardiopulmonary disease.  EN KINSEY MD  Reading per radiology    Laboratory:  Laboratory findings were communicated with the patient who voiced understanding of the findings.    CBC: WBC 5.2, HGB 9.7 (L),  (L)  BMP:  (L), Glucose 279 (H), Bun 100 (H), Creatinine 10.90 (H), GFR Estimate 4 (L) o/w WNL  Troponin (Collected: 0305): 0.046 (H)   Magnesium: 2.7 (H)  Nt ProBNP Inpatient: 46928  (H)    Interventions:  0417 Lasix 80 mg IV  0423 Nitroglycerine 50 mg in D5W 250 ml IV  0455 Nitroglycerine 50 mg in D5W 250 ml IV  0519 Tylenol 1000 mg Oral  0548 Labetalol 5 mg IV  0554 Norvasc 5 mg Oral  0555 Labetalol 5 mg IV   Coreg 12.5 mg Oral    Emergency Department Course:    0217 Nursing notes and vitals reviewed.    0236 I performed an exam of the patient as documented above.     0305 IV was inserted and blood was drawn for laboratory testing, results above.    0309 EKG obtained as noted above.    0318 The patient was sent for a chest xray while in the emergency department, results above.     0430 I spoke with Dr. Fraser of the hospitalist service from [facility] regarding patient's presentation, findings, and plan of care.    Findings and plan explained to the Patient who consents to admission. Discussed the patient with Dr. Fraser, who will admit the patient for further monitoring, evaluation, and treatment.    Impression & Plan      Medical Decision Making:  Donald Raza is a 69 year old male with multiple medical problems including HIV, ESRD (dialysis T/Th/Sat), and coronary artery disease who presents to the emergency department today for evaluation of subjective fever, generalized weakness, and shortness of breath. He is afebrile but noted to be hypertensive on arrival. He has diffuse rales bilaterally. The rest of his exam was unremarkable. EKG shows no acute ischemic findings and no change from previous EKGs. Troponin is slightly elevated at 0.046. [place] [address]. He denies chest pain and elevation in troponin likely from hypertensive emergency in setting of ESRD. Chest xray showed few small patchy opacities. Doubt pneumonia given normal WBC and afebrile.  He was given lasix and started on a nitroglycerin drip. He was also given his home anti-HTN meds which he missed last evening.  He is not hypoxic or respiratory distress. Will admit him to the ICU for continued blood pressure  treatment, as well as dialysis.     Critical Care Time for this patient, exclusive of procedures, is 35 minutes.     Diagnosis:    ICD-10-CM    1. Hypertensive emergency I16.1 Nt probnp inpatient   2. ESRD (end stage renal disease) on dialysis (H) N18.6     Z99.2    3. Elevated troponin R79.89    4. Generalized muscle weakness M62.81      Disposition:   The patient is admitted into the care of Dr. Fraser.    Scribe Disclosure:  I, Elle Alves, am serving as a scribe at 2:27 AM on 10/15/2019 to document services personally performed by Jose Palencia MD based on my observations and the provider's statements to me.    [facility] EMERGENCY DEPARTMENT       Jose Palencia MD  10/15/19 0613

## 2019-10-15 NOTE — PROGRESS NOTES
Patient seen and examined.  Agree with assessment and plan as outlined earlier by my colleague, Dr. Fraser.  Briefly, patient has an extensive complex past medical history and is dialysis dependent.  He reports compliance and usually dialyzes on a Tuesday, Thursday, and Saturday schedule.  His last session was Saturday.  He presents here with shortness of breath and hypertensive urgency and was placed on a nitroglycerin drip for blood pressure management.  Patient was seen again on dialysis in the ICU.  On exam, patient is no distress.  Lung sounds are diminished at the bases.  Cardiovascular: Normal S1-S2 regular rate and rhythm.  Extremity demonstrates trace edema.  Reports that breathing is much more improved although moderately hypertensive.  Continue dialysis runs on an inpatient basis per nephrology.  Their assistance appreciated.  Continue to monitor electrolytes and titrate blood pressure medications as necessary.  Try to wean the patient off nitroglycerin drip and if so, could transfer out to telemetry later this evening.

## 2019-10-16 NOTE — PROGRESS NOTES
Renal Medicine Progress Note                                Donald Raza MRN# [id number]   Age: 69 year old YOB: 1950   Date of Admission: 10/15/2019 Hospital LOS: 1                  Assessment/Plan:     Admitted with CP, SOB, hypertensive urgency and fever        1.  ESRD              -[facility]              -Dr. Dominik Chavez              -AVF              -target weight 88.5              -off 10/12/19 at 90.2 kg  2.  Anemia              -MIHAI  3.  Mineral Bone Disease              -Hectorol              -last PTH 09/14/19 583 pg/ml  4.  Hypertensive urgency              -BP improved with UF      Dialysis 10/17/19      Interval History:     Remains on nitroglycerin gtt  Isosorbide 120 ER added    Outpatient oral BP meds reviewed ans confirmed with current orders    Patient feels target weight closer to 89.5 kg not 88.5 kg      ROS:     GENERAL: NAD, No fever,chills  R: NEGATIVE for significant cough or SOB  CV: NEGATIVE for chest pain, palpitations  EXT: no change edema  ROS otherwise negative    Medications and Allergies:     Reviewed    Physical Exam:     Vitals were reviewed  Patient Vitals for the past 8 hrs:   BP Temp Temp src Pulse Heart Rate Resp SpO2 Weight   10/16/19 0745 125/73 -- -- -- 78 (!) 31 94 % --   10/16/19 0730 (!) 142/79 -- -- 75 73 12 93 % --   10/16/19 0715 (!) 158/69 -- -- -- 71 17 91 % --   10/16/19 0700 (!) 156/68 -- -- 81 80 18 94 % --   10/16/19 0600 (!) 157/76 -- -- 67 73 9 100 % --   10/16/19 0500 113/76 -- -- 77 73 (!) 40 97 % --   10/16/19 0456 -- -- -- -- -- -- 100 % --   10/16/19 0400 (!) 151/70 -- -- 77 80 27 98 % --   10/16/19 0325 (!) 155/69 99  F (37.2  C) Temporal -- -- -- 99 % 90.3 kg (199 lb 1.2 oz)   10/16/19 0300 (!) 175/89 -- -- 74 74 9 93 % --   10/16/19 0237 (!) 156/68 -- -- 83 78 15 95 % --   10/16/19 0200 (!) 175/87 -- -- 76 76 18 95 % --     I/O last 3 completed shifts:  In: 1303.08 [P.O.:560; I.V.:743.08]  Out: 2020 [Urine:20;  Other:2000]    Vitals:    10/15/19 0225 10/15/19 0645 10/16/19 0325   Weight: 80 kg (176 lb 5.9 oz) 90.5 kg (199 lb 8.3 oz) 90.3 kg (199 lb 1.2 oz)         GENERAL: awake, alert, follows  HEENT: NC/AT, PERRLA, EOMI, non icteric, pharynx moist without lesion  NECK: supple, no masses or adenopathy  RESP:  clear anteriorly  CV: RRR, normal S1 S2  ABDOMEN: soft, nontender, no HSM or masses and bowel sounds normal  MS: no clubbing, cyanosis   SKIN: clear without significant rashes or lesions  NEURO: speech normal and cranial nerves 2-12 intact  PSYCH: affect normal/bright  EXT: warm, no edema    Data:     Recent Labs   Lab 10/16/19  0536 10/15/19  0305    130*   POTASSIUM 3.9 4.5   CHLORIDE 99 95   CO2 26 24   ANIONGAP 10 11   * 279*   BUN 65* 100*   CR 8.34* 10.90*   GFRESTIMATED 6* 4*   GFRESTBLACK 7* 5*   PRABHA 9.5 9.4         Recent Labs   Lab 10/16/19  0536 10/15/19  0305   CR 8.34* 10.90*     No lab results found in last 7 days.  Recent Labs   Lab 10/16/19  0536 10/15/19  0305   PHOS 6.6*  --    HGB 8.9* 9.7*         G David Fuller MD    [facility] - Nephrology  [phone]

## 2019-10-16 NOTE — PROGRESS NOTES
[facility]  Hospitalist Progress Note    Admit Date:  10/15/2019  Date of Service (when I saw the patient): 10/16/2019   Provider:  Barbara Enriquez DO    Summary of stay:  Donald Raza is a 69 year old male who was admitted on 10/15/2019. He has a complicated medical history such as HIV stable on antiretroviral, CAD with prior PCI, ESRD on maintenance hemodialysis, (Tuesday Thursday, Saturday schedule), with last session last Saturday as scheduled, insulin requiring diabetes mellitus type 2, TIA, hypertension, dyslipidemia, chronic anemia, prior pacemaker insertion for bradycardia, numerous hospitalizations in the past for atypical chest pain and severe uncontrolled hypertension, prior documented intermittent noncompliance and sometimes missing hemodialysis sessions.  However Donald stated that he has been compliant with all these medications and dialysis sessions and he was in his usual state of health until few hours prior to this emergency room presentation when he started to complaint of not feeling well with accompanying diaphoresis, sensation of shortness of breath, chest discomfort and feeling bloated a few hours duration    Assessment & Plan     1.  Hypertensive emergency/urgency  BP much improved currently on nitroglycerin gtt - will try to wean off  In ED was given IV labetolol, po imdur (home dose), po norvasc (now 5mg po bid), po coreg, po avapro then started on a nitroglycerin gtt  Home meds include: imdure 120mg/d, norvasc 5mg bid, coreg 25mg po bid, avapro 300mg/d and recent clonidine 0.1mg? Daily prn (currently not on)    Will add bid clonidine to current meds - could be having some rebound htn as pharmacy notes states he was taking this several times a week PTA.    2.  ESRD  On hemodialysis 3x's/wk  Appreciate Nephrology co-management    3.  Detectable troponin I levels   secondary to #1 in the setting of ESRD  No having any CP symptoms  Does have a history of CAD with prior  "PCI  On daily asa 81mg daily    4.  Chronic thrombocytopenia and anemia  No active s/s of bleeding or concerning bleeding  Continue to monitor     5. DM - insulin requiring  On decreased dose of bid lantus (18 units instead of 24 units bid)  Home tid lispro on hold  Continue diabetic diet  ssi prn  Adjust as needed --BS have been in 130's today    8.  History of noncompliance  9.  Known HIV on HAART  No changes in home meds    10.  Dyslipidemia  On home statin    11.  Weakness and deconditioning  Will likely benefit from PT/OT     Diet: Snacks/Supplements Adult: Nepro Oral Supplement; With Meals  Dialysis Diet    DVT Prophylaxis: Heparin SQ  Christie Catheter: not present  Code Status: Full Code      Disposition Plan   Expected discharge: 2 - 3 days, recommended to TCU vs. home once blood pressure within goal range.  Entered: Barbara Enriquez DO 10/16/2019, 8:37 AM       The patient's care was discussed with the Bedside Nurse, Care Coordinator/, Patient and intensivist  Consultant.    Interval History      Denies HA at this time.  Hungry and non N/V.  No F/C.  \"My right ear has been hurting on and off\".  No diaphoresis currently.  \"I am feeling better\".      -Data reviewed today: I reviewed all new labs and imaging results over the last 24 hours. I personally reviewed no images or EKG's today.    Physical Exam   Temp: 99  F (37.2  C) Temp src: Temporal BP: 125/73 Pulse: 75 Heart Rate: 78 Resp: (!) 31 SpO2: 94 % O2 Device: None (Room air) Oxygen Delivery: 2 LPM  Vitals:    10/15/19 0225 10/15/19 0645 10/16/19 0325   Weight: 80 kg (176 lb 5.9 oz) 90.5 kg (199 lb 8.3 oz) 90.3 kg (199 lb 1.2 oz)     Vital Signs with Ranges  Temp:  [96.4  F (35.8  C)-99  F (37.2  C)] 99  F (37.2  C)  Pulse:  [60-83] 75  Heart Rate:  [59-80] 78  Resp:  [6-40] 31  BP: ()/() 125/73  SpO2:  [91 %-100 %] 94 %  I/O last 3 completed shifts:  In: 1303.08 [P.O.:560; I.V.:743.08]  Out: 2020 [Urine:20; " Other:2000]    GEN:  Alert, oriented x 3, fairly comfortable at rest, not dyspneic at rest or with conversation.  Appears older than his states age  HEENT:  Normocephalic/atraumatic, no scleral icterus, no nasal discharge, mouth and membranes appear moist.  NECK:  No clear thyromegaly of clear JVD  CV:  Regular rate and rhythm, somewhat distant but no clearmurmur to ausc.  S1 + S2 noted, no S3 or S4.  LUNGS:  Clear to auscultation ant/lat bilaterally.  Somewhat diminished at the bases bilaterally to limited posterior exam - but no rales/rhonchi/wheezing auscultated bilaterally.  No costal retractions bilaterally.  Symmetric chest rise on inhalation noted.  ABD:  Active bowel sounds, soft, non-tender, no real distension.  No rebound/guarding/rigidity.  No masses palpated.  No obvious HSM to exam.  EXT:  Trace to 1+ pitting pretibial edema bilaterally, no cyanosis bilaterally. No joint synovitis noted.  No calf-tenderness or asymmetry noted.  SKIN:  Dry to touch, no rashes or jaundice noted.  PSYCH:  Mood appropriate, Not tearful or depressed.  Maintains direct eye contact.   NEURO:  No tremors at rest, speech clear and appropriate.  Orientated to recent events of yesterday.    Data   Labs:  No results for input(s): CULT in the last 168 hours.  Recent Labs   Lab 10/16/19  0536 10/15/19  0305    130*   POTASSIUM 3.9 4.5   CHLORIDE 99 95   CO2 26 24   ANIONGAP 10 11   * 279*   BUN 65* 100*   CR 8.34* 10.90*   GFRESTIMATED 6* 4*   GFRESTBLACK 7* 5*   PRABHA 9.5 9.4     Recent Labs   Lab 10/16/19  0536 10/15/19  0305   WBC 5.8 5.2   HGB 8.9* 9.7*   HCT 29.3* 31.4*   MCV 94 94   * 120*      Recent Imaging:   No results found for this or any previous visit (from the past 24 hour(s)).    Medications     nitroGLYcerin 2 mcg/kg/min (10/16/19 0800)       - MEDICATION INSTRUCTIONS for Dialysis Patients -   Does not apply See Admin Instructions     abacavir  600 mg Oral QPM     amLODIPine  5 mg Oral BID      aspirin  81 mg Oral QPM     atorvastatin  40 mg Oral At Bedtime     carvedilol  25 mg Oral BID w/meals     dolutegravir  50 mg Oral At Bedtime     heparin  5,000 Units Subcutaneous Q12H     insulin aspart  1-10 Units Subcutaneous TID AC     insulin aspart  1-7 Units Subcutaneous At Bedtime     insulin glargine  18 Units Subcutaneous BID     irbesartan  300 mg Oral At Bedtime     isosorbide mononitrate  120 mg Oral QPM     mirtazapine  15 mg Oral At Bedtime     pantoprazole  40 mg Oral Daily     ritonavir  100 mg Oral At Bedtime

## 2019-10-16 NOTE — PLAN OF CARE
Patient sleeping, denies pain when arroused    ICU End of Shift Summary.  For vital signs and complete assessments, please see documentation flowsheets.     Pertinent assessments: cont to manage elevated bllod pressure, unable to wean nitroglycerin drip, administered PTA meds and PRN meds for elevated bp, c/o headache treated with tylenol and cold compresses, repositioning.  Hr remained steady in 70. Underlying paced rhythm.  Up to chair and bedside commode with assist of 2, moves left leg and foot with difficulty, appetite is good, off of oxygen most of day, however after 1 time up asked for oxygen per nasal cannula due to dyspnea,  Lungs cont diminished, at evening rales in bases.  No coverage for accucheck at 2200 due to not meeting parameters. Restless at times tossing and turning in bed.    Major Shift Events: unable to wean ntg drip today, provide total relief of h /a.  Plan (Upcoming Events): cont poc , dialysis on Thursday 10/17  Discharge/Transfer Needs: tbd    Bedside Shift Report Completed :   Bedside Safety Check Completed:

## 2019-10-16 NOTE — PLAN OF CARE
ICU End of Shift Summary.  For vital signs and complete assessments, please see documentation flowsheets.     Pertinent assessments: BP elevated otherwise VSS. Alert & oriented, became more confused during shift. C/o headache and pain in legs, PRN tylenol given. Tele: paced (SR 1AVB). 2L O2 NC; lung sounds diminished. Pt anuric, hemodialysis. Mod carb renal diet. No BM this shift, BS+. Left AV fistula; skin CDI. Pt moves independently in bed.     Major Shift Events: unable to get SBP <140 using nitro drip, PRN labetolol, and PRN hydralazine. Pt became more confused; troponin increase  Plan (Upcoming Events): titrate nitro drip as able, monitor troponin   Discharge/Transfer Needs: TBD    Bedside Shift Report Completed   Bedside Safety Check Completed

## 2019-10-16 NOTE — PROGRESS NOTES
Seen on rounds:  Still on iv nitroglycerin.. Home meds reviewed with Dr Fuller  P: restart home imdur  Change iv nitroglycerin parameter to 160 systolic  Continue home ibesartan, amlodipine, labetalol --> (not a home med)

## 2019-10-17 NOTE — PROGRESS NOTES
Potassium   Date Value Ref Range Status   10/16/2019 3.9 3.4 - 5.3 mmol/L Final     Hemoglobin   Date Value Ref Range Status   10/16/2019 8.9 (L) 13.3 - 17.7 g/dL Final     Creatinine   Date Value Ref Range Status   10/16/2019 8.34 (H) 0.66 - 1.25 mg/dL Final     Urea Nitrogen   Date Value Ref Range Status   10/16/2019 65 (H) 7 - 30 mg/dL Final     Sodium   Date Value Ref Range Status   10/16/2019 135 133 - 144 mmol/L Final     INR   Date Value Ref Range Status   05/13/2019 1.10 0.86 - 1.14 Final       DIALYSIS PROCEDURE NOTE  Hepatitis status of previous patient on machine log was checked and verified ok to use with this patients hepatitis status.  Patient dialyzed for 2.5 hrs. on a 3 K bath with a net fluid removal of  2L.  A BFR of 400 ml/min was obtained via a Right Fistula using 15 gauge needles.    The patient was seen by Dr. Armstrong during the treatment.  Total heparin received during the treatment: 0 units.   Needle cannulation sites held x 45 min, pressure dressings applied after hemostasis.  Line flushed, clamped and capped with heparin 1:1000 0 mL (0 units) per lumen  Meds  given:NONE Complications: Excessive bleeding post run, 45 minute pressure applied.  Procedure education provided orally to patient verbalized/ understanding  ICEBOAT? Timeout performed pre-treatment  I: Patient was identified using 2 identifiers  C: Consent obtained/verified current before treatment  E: Equipment preventative maintenance is current and dialysis delivery system OK to use  B: Hepatitis B Surface Antigen: Nonreactive; Draw Date: 1/29/19      Hepatitis B Surface Antibody: Immune; Draw Date: 1/29/19  O: Dialysis orders present and complete prior to treatment  A: Vascular access verified and assessed prior to treatment  T: Treatment was performed at a clinically appropriate time  ?: Patient was allowed to ask questions and address concerns prior to treatment  See flowsheet in EPIC for further details and post  assessment.  Machine water alarm in place and functioning. Transducer pods intact and checked every 15min.    Report received from: ROBRET Bingham RN.    Report given to: ROBERT Bingham RN  Chlorine/Chloramine water system checked every 4 hours.  Outpatient Dialysis at [facility].

## 2019-10-17 NOTE — PROGRESS NOTES
[facility]  Hospitalist Progress Note    Admit Date:  10/15/2019  Date of Service (when I saw the patient): 10/17/2019   Provider:  Barbara Enriquez DO    Summary of stay:  Donald Raza is a 69 year old male who was admitted on 10/15/2019. He has a complicated medical history such as HIV stable on antiretroviral, CAD with prior PCI, ESRD on maintenance hemodialysis, (Tuesday Thursday, Saturday schedule), with last session last Saturday as scheduled, insulin requiring diabetes mellitus type 2, TIA, hypertension, dyslipidemia, chronic anemia, prior pacemaker insertion for bradycardia, numerous hospitalizations in the past for atypical chest pain and severe uncontrolled hypertension, prior documented intermittent noncompliance and sometimes missing hemodialysis sessions.  However Donald stated that he has been compliant with all these medications and dialysis sessions and he was in his usual state of health until few hours prior to this emergency room presentation when he started to complaint of not feeling well with accompanying diaphoresis, sensation of shortness of breath, chest discomfort and feeling bloated a few hours duration    Assessment & Plan     1.  Hypertensive emergency/urgency and new altered MS  BP improved weaned off iv nitroglycerin gtt   Home meds resumed: imdure 120mg/d, norvasc 5mg bid, coreg 25mg po bid, avapro 300mg/d and  clonidine 0.1mg restarted Daily (could be having some rebound htn as pharmacy notes states he was taking this several times a week PTA)  - monitor BP and avoid normalizing BP as could put him at risk of stroke  - stop gabapentin, hold Remeron and Benzo   - checked glucose and ABG and unremarkable  - CT to check for PRESS (cannot have an MRI), stroke, ICB  - neuro consult    2.  ESRD  On hemodialysis 3x's/wk  HD today  Appreciate Nephrology co-management    3.  Detectable troponin I levels   secondary to #1 in the setting of ESRD  No having any CP  symptoms  Does have a history of CAD with prior PCI  On daily asa 81mg daily    4.  Chronic thrombocytopenia and anemia  No active s/s of bleeding or concerning bleeding  Continue to monitor     5. DM - insulin requiring  On decreased dose of bid lantus (9 units instead of 18 units bid , on 24 unit(s) PTA)  Home tid lispro on hold  Given altered MS and reduced intake will  Decrease lantus as above  ssi prn    8.  History of noncompliance  9.  Known HIV on HAART  No changes in home meds    10.  Dyslipidemia  On home statin    11.  Weakness and deconditioning  Will likely benefit from PT/OT     Diet: Snacks/Supplements Adult: Nepro Oral Supplement; With Meals  Dialysis Diet    DVT Prophylaxis: Heparin SQ  Christie Catheter: not present  Code Status: Full Code      Disposition Plan   Expected discharge: 2 - 3 days, recommended to TCU vs. home once blood pressure within goal range.  Entered: Lex Alamo MD 10/17/2019, 1:07 PM       The patient's care was discussed with the Bedside Nurse, Care Coordinator/, Patient and intensivist  Consultant.    Interval History    Altered MS , very somnolent and hard to wait up. Able to protect his airway. Unable to get a hx.      -Data reviewed today: I reviewed all new labs and imaging results over the last 24 hours. I personally reviewed no images or EKG's today.    Physical Exam   Temp: 98.1  F (36.7  C) Temp src: Oral BP: (!) 163/66 Pulse: 64 Heart Rate: 71 Resp: 15 SpO2: 97 % O2 Device: Nasal cannula Oxygen Delivery: 2 LPM  Vitals:    10/16/19 0325 10/17/19 0700 10/17/19 0800   Weight: 90.3 kg (199 lb 1.2 oz) 89.5 kg (197 lb 5 oz) 89.5 kg (197 lb 5 oz)     Vital Signs with Ranges  Temp:  [98  F (36.7  C)-98.3  F (36.8  C)] 98.1  F (36.7  C)  Pulse:  [63-75] 64  Heart Rate:  [62-80] 71  Resp:  [9-31] 15  BP: (142-194)/(66-99) 163/66  SpO2:  [75 %-99 %] 97 %  I/O last 3 completed shifts:  In: 1280 [P.O.:1130; I.V.:150]  Out: 80 [Urine:80]    GEN:   Somnolent/obtended    HEENT:  Normocephalic/atraumatic, no scleral icterus,   NECK:  No meningeal sign  CV:  Regular rate and rhythm, S1 + S2 noted, no S3 or S4.  LUNGS:  Clear to auscultation ant/lat bilaterally.  Somewhat diminished at the bases bilaterally to limited posterior exam - but no rales/rhonchi/wheezing auscultated bilaterally.  No costal retractions bilaterally.  Symmetric chest rise on inhalation noted.  ABD:  Active bowel sounds, soft, non-tender, no real distension.  No rebound/guarding/rigidity.  No masses palpated.  No obvious HSM to exam.  EXT:  Trace to 1+ pitting pretibial edema bilaterally, no cyanosis bilaterally. No joint synovitis noted.  No calf-tenderness or asymmetry noted.  SKIN:  Dry to touch, no rashes or jaundice noted.  PSYCH:  Cannot assess  NEURO:  Obtunded. Withdraw to pain and wakes up a few second when stimulated UE. Less responsive to pain LE?     Data   Labs:  No results for input(s): CULT in the last 168 hours.  Recent Labs   Lab 10/16/19  0536 10/15/19  0305    130*   POTASSIUM 3.9 4.5   CHLORIDE 99 95   CO2 26 24   ANIONGAP 10 11   * 279*   BUN 65* 100*   CR 8.34* 10.90*   GFRESTIMATED 6* 4*   GFRESTBLACK 7* 5*   PRABHA 9.5 9.4     Recent Labs   Lab 10/16/19  0536 10/15/19  0305   WBC 5.8 5.2   HGB 8.9* 9.7*   HCT 29.3* 31.4*   MCV 94 94   * 120*      Recent Imaging:   No results found for this or any previous visit (from the past 24 hour(s)).    Medications       - MEDICATION INSTRUCTIONS for Dialysis Patients -   Does not apply See Admin Instructions     abacavir  600 mg Oral QPM     amLODIPine  5 mg Oral BID     aspirin  81 mg Oral QPM     atorvastatin  40 mg Oral At Bedtime     carvedilol  25 mg Oral BID w/meals     cinacalcet  30 mg Oral Once per day on Tue Thu Sat     cloNIDine  0.1 mg Oral BID     dapsone  100 mg Oral At Bedtime     darunavir  800 mg Oral At Bedtime     dolutegravir  50 mg Oral At Bedtime     heparin  5,000 Units Subcutaneous Q12H      insulin aspart  1-10 Units Subcutaneous TID AC     insulin aspart  1-7 Units Subcutaneous At Bedtime     insulin glargine  18 Units Subcutaneous BID     irbesartan  300 mg Oral At Bedtime     isosorbide mononitrate  120 mg Oral QPM     magnesium oxide  400 mg Oral At Bedtime     multivitamin RENAL  1 capsule Oral At Bedtime     omega-3 acid ethyl esters  2 g Oral BID     pantoprazole  40 mg Oral Daily     ritonavir  100 mg Oral At Bedtime     sucroferric oxyhydroxide  500 mg Oral TID w/meals     umeclidinium  1 puff Inhalation Daily

## 2019-10-17 NOTE — PROGRESS NOTES
"Renal Medicine Inpatient Dialysis Note                                Donald Raza MRN# [id number]   Age: 69 year old YOB: 1950   Date of Admission: 10/15/2019 Hospital LOS: 2          Assessment/Plan:       Admitted with CP, SOB, hypertensive urgency and fever        1.  ESRD              -[facility]              -Dr. Dominik Chavez              -AVF              -target weight 88.5              -off 10/12/19 at 90.2 kg  2.  Anemia              -MIHAI  3.  Mineral Bone Disease              -Hectorol              -last PTH 09/14/19 583 pg/ml  4.  Hypertensive urgency              -BP improved with UF      TTHS schedule        Interval History:     Dialysis run parameters reviewed with dialysis RN at patient bedside    2.5 hour run time  2 liter UF  3K bath  AVF    Below outpatient target weight    BP remain moderate  Off nitroglycerine gtt  Clonidine added    Lethargic today       ROS     GENERAL: NAD, No fever,chills  R: NEGATIVE for significant cough or SOB  CV: NEGATIVE for chest pain, palpitations  EXT: no change edema      Dialysis Parameters:     Vitals were reviewed  Patient Vitals for the past 8 hrs:   BP Temp Temp src Pulse Heart Rate Resp SpO2 Height Weight   10/17/19 1030 (!) 157/85 -- -- -- 64 14 95 % -- --   10/17/19 1015 (!) 177/75 -- -- -- 64 14 98 % -- --   10/17/19 1000 (!) 162/79 -- -- -- 65 14 99 % -- --   10/17/19 0945 (!) 153/78 -- -- -- 65 13 99 % -- --   10/17/19 0930 (!) 157/86 -- -- 65 66 13 98 % -- --   10/17/19 0915 (!) 157/78 -- -- 66 66 15 98 % -- --   10/17/19 0900 (!) 151/78 -- -- 66 64 14 99 % -- --   10/17/19 0845 (!) 167/78 -- -- 66 66 12 98 % -- --   10/17/19 0840 (!) 159/79 -- -- 67 67 15 97 % -- --   10/17/19 0830 (!) 163/80 -- -- 72 71 19 96 % -- --   10/17/19 0815 (!) 159/84 -- -- 67 68 -- -- -- --   10/17/19 0800 (!) 157/79 -- -- 72 68 15 97 % -- 89.5 kg (197 lb 5 oz)   10/17/19 0700 (!) 162/78 -- -- 72 74 25 93 % 1.803 m (5' 11\") 89.5 kg (197 lb 5 oz) " "  10/17/19 0530 (!) 151/75 -- -- 70 71 14 93 % -- --   10/17/19 0500 (!) 157/83 -- -- 68 70 26 95 % -- --   10/17/19 0430 (!) 149/72 -- -- 66 65 19 96 % -- --   10/17/19 0400 (!) 142/71 98.3  F (36.8  C) Oral 71 69 17 96 % -- --   10/17/19 0330 (!) 148/79 -- -- 71 69 19 96 % -- --   10/17/19 0300 (!) 152/74 -- -- 71 73 17 97 % -- --     I/O last 3 completed shifts:  In: 1280 [P.O.:1130; I.V.:150]  Out: 80 [Urine:80]    Vitals:    10/15/19 0225 10/15/19 0645 10/16/19 0325 10/17/19 0700   Weight: 80 kg (176 lb 5.9 oz) 90.5 kg (199 lb 8.3 oz) 90.3 kg (199 lb 1.2 oz) 89.5 kg (197 lb 5 oz)    10/17/19 0800   Weight: 89.5 kg (197 lb 5 oz)       Current Weight: 89.5  Dry Weight: 88.5  Dialysis Temp: 36.5  C  Access Device: AVF  Access Site: left  Dialyzer: Revaclear  Dialysis Bath: 3  Sodium Profile: n  UF Goal: 2  Blood Flow Rate (mL/min): 400  Total Treatment Time (hrs): 2.5  Heparin: Low dose as required      EPO dose: y  Zemplar: n  IV Fe: n      Medications and Allergies:     Reviewed      Physical Exam:     Seen and examined during course of dialysis run    BP (!) 157/85   Pulse 65   Temp 98.3  F (36.8  C) (Oral)   Resp 14   Ht 1.803 m (5' 11\")   Wt 89.5 kg (197 lb 5 oz)   SpO2 95%   BMI 27.52 kg/m      GENERAL: lethargic  HEENT: NC O2  RESP: clear  CV: RRR, normal S1 S2  ABDOMEN: S/NT, BS present  MS: no edema  EXT: access canulated without issue  NEURO: lethargic    Data:       Recent Labs   Lab 10/16/19  0536      POTASSIUM 3.9   CHLORIDE 99   CO2 26   ANIONGAP 10   *   BUN 65*   CR 8.34*   GFRESTIMATED 6*   GFRESTBLACK 7*   PRABHA 9.5     Recent Labs   Lab 10/16/19  0536 10/15/19  0305   PHOS 6.6*  --    HGB 8.9* 9.7*         G David Fuller MD    [facility] - Nephrology  [phone]          "

## 2019-10-17 NOTE — PLAN OF CARE
ICU End of Shift Summary.  For vital signs and complete assessments, please see documentation flowsheets.     Pertinent assessments: LS coarse, BP continues to be elevated this shift, beginning to attempt to titrate off of nitroglycerin gtt.  Pt denying pain, lethargic this shift.  Pt restless at times, mostly while sleeping.  Pt skin intact, no bowel movement or urine output this shift.  Continue POC.  Major Shift Events: None  Plan (Upcoming Events): Titrate off of nitroglycerin gtt, monitor BP and plan for dialysis today.  Discharge/Transfer Needs: None    Bedside Shift Report Completed : Y  Bedside Safety Check Completed:Y

## 2019-10-17 NOTE — PROGRESS NOTES
Respiratory Therapy Note        An ABG was completed on the pt's right radial artery @ 09:34 on an FIO2 of 2 Lpm NC with no complications noted during the procedure.    October 17, 2019 9:41 AM  Chidi Hernandez RT

## 2019-10-17 NOTE — CONSULTS
Neurology Consult Note  [facility]       [2019]                                                                                       Admission Date: 10/15/2019  Hospital Day: 3      Patient: Donald Raza      : 1950  MRN:  [id number]     CC:      Chief Complaint   Patient presents with     Fever       Consult Request:  Referring Provider:  Tam Fraser MD  Primary Care Provider:  Sukh Owen MD        HPI:  Donald Raza is a 69 year old yo male admitted for diaphoresis, sensation of shortness of breath, chest discomfort and feeling bloated. He had dialysis this morning and developed increased lethargy. He was somnolent and could not stay awake this afternoon. He received IV glucose before my evaluation and he woke up. He is responding well at present.  He has a complicated medical history such as HIV stable on antiretroviral, CAD with prior PCI, ESRD on maintenance hemodialysis, (, Saturday schedule), with last session last Saturday as scheduled, insulin requiring diabetes mellitus type 2, TIA, hypertension, dyslipidemia, chronic anemia, prior pacemaker insertion for bradycardia, numerous hospitalizations in the past for atypical chest pain and severe uncontrolled hypertension, prior documented intermittent noncompliance and sometimes missing hemodialysis sessions        A complete review of symptoms was performed including vascular, infectious, cardiovascular, pulmonary, gastrointestinal, endocrinological, hematologic, dermatologic, musculoskeletal, and neurological. All were normal except as above.    PAST MEDICAL HISTORY:  ALLERGIES:   Allergies   Allergen Reactions     Calcium Acetate Other (See Comments)     Other reaction(s): Other, see comments  Pain in chest and back  Pain in chest area (sensitive skin)      Contrast Dye Other (See Comments)     Contrast nephropathy     Diagnostic X-Ray Materials Other (See Comments)      PN: renal failure     Lisinopril      Sulfa Drugs      Tobacco:    History   Smoking Status     Former Smoker     Packs/day: 2.00     Years: 41.00     Types: Cigarettes     Quit date: 2003   Smokeless Tobacco     Never Used     Alcohol:  Social History    Substance and Sexual Activity      Alcohol use: No        Alcohol/week: 0.0 standard drinks    MEDICATIONS:       CURRENTLY SCHEDULED MEDICATIONS     - MEDICATION INSTRUCTIONS for Dialysis Patients -   Does not apply See Admin Instructions     abacavir  600 mg Oral QPM     amLODIPine  5 mg Oral BID     aspirin  81 mg Oral QPM     atorvastatin  40 mg Oral At Bedtime     carvedilol  25 mg Oral BID w/meals     cinacalcet  30 mg Oral Once per day on Tue Thu Sat     cloNIDine  0.1 mg Oral BID     dapsone  100 mg Oral At Bedtime     darunavir  800 mg Oral At Bedtime     dolutegravir  50 mg Oral At Bedtime     heparin  5,000 Units Subcutaneous Q12H     insulin aspart  1-10 Units Subcutaneous TID AC     insulin aspart  1-7 Units Subcutaneous At Bedtime     insulin glargine  9 Units Subcutaneous BID     irbesartan  300 mg Oral At Bedtime     isosorbide mononitrate  120 mg Oral QPM     magnesium oxide  400 mg Oral At Bedtime     multivitamin RENAL  1 capsule Oral At Bedtime     omega-3 acid ethyl esters  2 g Oral BID     pantoprazole  40 mg Oral Daily     ritonavir  100 mg Oral At Bedtime     sucroferric oxyhydroxide  500 mg Oral TID w/meals     umeclidinium  1 puff Inhalation Daily          HOME MEDICATIONS  Medications Prior to Admission   Medication Sig Dispense Refill Last Dose     abacavir (ZIAGEN) 300 MG tablet Take 600 mg by mouth every evening    10/13/2019 at pm     aspirin 81 MG EC tablet Take 81 mg by mouth every evening   Past Week at Unknown time     cinacalcet (SENSIPAR) 30 MG tablet Take 30 mg by mouth three times a week On dialysis days        cloNIDine (CATAPRES) 0.1 MG tablet Take 0.1 mg by mouth daily as needed (for high blood pressure)         guaiFENesin  (MUCINEX) 600 MG 12 hr tablet Take 1,200 mg by mouth 2 times daily as needed    Past Month at Unknown time     hypromellose-dextran (ARTIFICAL TEARS) 0.1-0.3 % ophthalmic solution Place 1 drop into both eyes daily as needed for dry eyes    Past Week at Unknown time     imiquimod (ALDARA) 5 % cream Apply topically as needed   Past Month at Unknown time     insulin glargine (LANTUS PEN) 100 UNIT/ML pen Inject 24 Units Subcutaneous 2 times daily    10/14/2019 at am     insulin lispro (HUMALOG PEN) 100 UNIT/ML pen Inject 14 Units Subcutaneous 3 times daily (before meals)    10/13/2019 at Unknown time     ipratropium - albuterol 0.5 mg/2.5 mg/3 mL (DUONEB) 0.5-2.5 (3) MG/3ML neb solution Take 1 vial (3 mLs) by nebulization every 6 hours as needed for shortness of breath / dyspnea or wheezing 90 vial 1 Past Week at Unknown time     melatonin 5 MG tablet Take 5 mg by mouth nightly as needed    Past Month at Unknown time     polyethylene glycol (MIRALAX/GLYCOLAX) packet Take 1 packet by mouth daily as needed for constipation   Past Month at Unknown time     sucroferric oxyhydroxide (VELPHORO) 500 MG CHEW chewable tablet Take 500 mg by mouth 3 times daily (with meals)   Past Week at Unknown time     umeclidinium (INCRUSE ELLIPTA) 62.5 MCG/INH inhaler Inhale 1 puff into the lungs daily   Past Week at Unknown time     Acetaminophen (TYLENOL PO) Take 325 mg by mouth every 6 hours as needed for mild pain or fever    10/13/2019     albuterol (PROAIR HFA/PROVENTIL HFA/VENTOLIN HFA) 108 (90 BASE) MCG/ACT Inhaler Inhale 2 puffs into the lungs every 6 hours as needed for shortness of breath / dyspnea or wheezing 1 Inhaler 1 Taking     amLODIPine (NORVASC) 5 MG tablet Take 1 tablet (5 mg) by mouth 2 times daily 60 tablet 0 10/13/2019     atorvastatin (LIPITOR) 40 MG tablet Take 40 mg by mouth At Bedtime   10/13/2019     B COMPLEX-C-FOLIC ACID PO Take 1 tablet by mouth At Bedtime    10/13/2019     carvedilol (COREG) 25 MG tablet Take 1  "tablet (25 mg) by mouth 2 times daily (with meals) 60 tablet 0 10/13/2019     chlorhexidine (CHLORHEXIDINE) 0.12 % solution Rinse and gargle 15 ml by mouth twice a day as directed.   10/13/2019     CLONAZEPAM PO Take 0.5 mg by mouth nightly as needed for anxiety (restless legs)   Unknown at Unknown time     dapsone 100 MG tablet Take 100 mg by mouth At Bedtime    10/13/2019     Darunavir Ethanolate (PREZISTA PO) Take 800 mg by mouth At Bedtime.   10/13/2019     dolutegravir (TIVICAY) 50 MG tablet Take 50 mg by mouth At Bedtime   10/13/2019     DOXERCALCIFEROL IV Inject 1 mcg into the vein three times a week (with dialysis); per [facility] dialysis records 10/15/19   Taking     epoetin brittni (EPOGEN,PROCRIT) 56932 UNIT/ML injection Inject 15,000 Units Subcutaneous three times a week WITH DIALYSIS; [facility] 10/15/19   Taking     gabapentin (NEURONTIN) 300 MG capsule Take 300 mg by mouth as needed May take after HD.   Unknown at Unknown time     gabapentin (NEURONTIN) 300 MG capsule Take 300 mg by mouth 2 times daily    10/13/2019     insulin lispro (HUMALOG PEN) 100 UNIT/ML pen Inject Subcutaneous 3 times daily (before meals) Takes 2 units for every 50 BG above 150.        irbesartan (AVAPRO) 300 MG tablet Take 1 tablet (300 mg) by mouth At Bedtime 30 tablet 0 10/13/2019     Isosorbide Mononitrate  MG TB24 Take 1 tablet (120 mg) by mouth every evening 30 tablet 11 10/13/2019     ketoconazole (NIZORAL) 2 % cream Apply topically daily Between toes for fungal infection   9/23/2019     MAGNESIUM OXIDE PO Take 400 mg by mouth At Bedtime   10/13/2019     mirtazapine (REMERON) 15 MG tablet Take 15 mg by mouth At Bedtime   10/13/2019     nitroglycerin (NITROSTAT) 0.4 MG SL tablet One tablet under the tongue every 5 minutes if needed for chest pain. May repeat every 5 minutes for a maximum of 3 doses in 15 minutes\" 25 tablet 3 Taking     Nutritional Supplements (NEPRO PO) Take 1 Container by mouth 3 times " daily 1-3 times daily   9/23/2019     omega-3 acid ethyl esters (LOVAZA) 1 G capsule Take 2 g by mouth 2 times daily   10/13/2019     pantoprazole (PROTONIX) 40 MG EC tablet Take 40 mg by mouth daily   10/13/2019     ritonavir (NORVIR) 100 MG capsule Take 1 capsule by mouth At Bedtime    10/13/2019     MEDICAL HISTORY  Past Medical History:   Diagnosis Date     Allergic rhinitis      Anemia due to chronic kidney disease 10/21/2011     CAD S/P percutaneous coronary angioplasty 6/15/2015     Cataract      CKD (chronic kidney disease)      Colon cancer (H)      COPD (chronic obstructive pulmonary disease) (H)      Depression      Dilated aortic root (H) 5/6/2016     Diverticulitis      ESRD (end stage renal disease) on dialysis (H) 5/6/2016     Gout      Human immunodeficiency virus (HIV) disease (H)      Hx of squamous cell carcinoma 03/23/2007     Hypertension 2010     Impotence of organic origin      Increased prostate specific antigen (PSA) velocity 08/08/2016    Awaiting bx on blood thinner     Mixed hyperlipidemia      Pulmonary HTN (H)     Mod     TIA (transient ischemic attack) 5/2016     Type 2 diabetes mellitus (H) age 50     SURGICAL HISTORY  Past Surgical History:   Procedure Laterality Date     ANGIOGRAM  03-04-16    No culprit lesions, stents widely patent      ANGIOGRAM  05-06-16    Cutting balloon ptca=Diag     APPENDECTOMY  2000     CHOLECYSTECTOMY, LAPOROSCOPIC       COLON SURGERY       COLOSTOMY  09/30/1999    Temporary for diverticulitis     EP PACEMAKER N/A 5/2/2019    Procedure: EP Pacemaker;  Surgeon: Angelique Perera MD;  Location:  HEART CARDIAC CATH LAB      LEFT HEART CATHETERIZATION  03/12/2018    No significant change  Elevated LVEDp  LVEF 30% No PCI     HEART CATH, ANGIOPLASTY  08-18-16    LAD PCI. Stented with a 3.0 x 8 mm Xience Alpine stent.     IR DIALYSIS FISTULOGRAM LEFT  1/25/2019     STENT, CORONARY, S660 15/18  12/2015    VANITA=Diag, PTCA=LAD     STENT, CORONARY,  S660 15/18  2015    VANITA=LAD     FAMILY HISTORY    Family History   Problem Relation Age of Onset     Heart Disease Brother 40        CABG     Kidney Disease Sister      Hypertension Sister      Heart Disease Brother         Dilated aorta     SOCIAL HISTORY  Social History     Socioeconomic History     Marital status:      Spouse name: Not on file     Number of children: Not on file     Years of education: Not on file     Highest education level: Not on file   Occupational History     Not on file   Social Needs     Financial resource strain: Not on file     Food insecurity:     Worry: Not on file     Inability: Not on file     Transportation needs:     Medical: Not on file     Non-medical: Not on file   Tobacco Use     Smoking status: Former Smoker     Packs/day: 2.00     Years: 41.00     Pack years: 82.00     Types: Cigarettes     Last attempt to quit:      Years since quittin.8     Smokeless tobacco: Never Used   Substance and Sexual Activity     Alcohol use: No     Alcohol/week: 0.0 standard drinks     Drug use: No     Sexual activity: Not on file   Lifestyle     Physical activity:     Days per week: Not on file     Minutes per session: Not on file     Stress: Not on file   Relationships     Social connections:     Talks on phone: Not on file     Gets together: Not on file     Attends Church service: Not on file     Active member of club or organization: Not on file     Attends meetings of clubs or organizations: Not on file     Relationship status: Not on file     Intimate partner violence:     Fear of current or ex partner: Not on file     Emotionally abused: Not on file     Physically abused: Not on file     Forced sexual activity: Not on file   Other Topics Concern     Parent/sibling w/ CABG, MI or angioplasty before 65F 55M? Yes      Service Not Asked     Blood Transfusions Not Asked     Caffeine Concern Yes     Comment: 2 cups daily     Occupational Exposure Not Asked     Hobby  "Hazards Not Asked     Sleep Concern Yes     Stress Concern Not Asked     Weight Concern Not Asked     Special Diet No     Comment:  more proteins     Back Care Not Asked     Exercise Yes     Comment: Cardiac rehab      Bike Helmet Not Asked     Seat Belt Not Asked     Self-Exams Not Asked   Social History Narrative     Not on file            Height: 180.3 cm (5' 11\")     Temp: 98.4  F (36.9  C)   Weight: 89.5 kg (197 lb 5 oz)    Temp src: Oral         BP: (!) 163/66   Heart Rate: 71     Estimated body mass index is 27.52 kg/m  as calculated from the following:    Height as of this encounter: 1.803 m (5' 11\").    Weight as of this encounter: 89.5 kg (197 lb 5 oz).    Resp: 15   SpO2: 90 %   O2 Device: Nasal cannula     Blood Pressure:   BP Readings from Last 3 Encounters:   10/17/19 (!) 163/66   19 (!) 169/72   19 (!) 165/80     T24 : Temp (24hrs), Av.2  F (36.8  C), Min:98  F (36.7  C), Max:98.4  F (36.9  C)       GENERAL EXAMINATION:  HEENT: PERRLA, EOMI.  Neck: Supple, No carotid bruits. No myofascial tender points.    NEUROLOGICAL EXAMINATION  Mental Status:  Patient is awake, alert, and oriented X person. He has mild dysarthria. He can follow commands.    Cranial Nerves: Pupils are equal and reactive to light.Extraocular movements are intact. There is no nystagmus in the horizontal or vertical planes.No facial sensory deficits. No facial weakness. The tongue is midline.     Motor: Muscle tone is normal. There is no pronator drift. There is no muscle atrophy. The strength is normal in upper and lower extremities bilaterally. Deep tendon reflexes are 2+ and symmetric in his biceps, triceps, knees, and ankles. Plantars are flexor. .     Coordination: not performed.    Gait: not performed.    .  LABORATORY RESULTS      Recent Labs   Lab 10/16/19  0536 10/15/19  0305   WBC 5.8 5.2   HGB 8.9* 9.7*   HCT 29.3* 31.4*   MCV 94 94   * 120*     Recent Labs   Lab 10/16/19  0536 10/15/19  0305    " 130*   POTASSIUM 3.9 4.5   CHLORIDE 99 95   CO2 26 24   ANIONGAP 10 11   * 279*   BUN 65* 100*   CR 8.34* 10.90*   GFRESTIMATED 6* 4*   GFRESTBLACK 7* 5*   PRABHA 9.5 9.4   MAG 2.3 2.7*   PHOS 6.6*  --      No results for input(s): LACT in the last 168 hours.    IMAGING RESULTS     Recent Results (from the past 24 hour(s))   CT Head w/o Contrast    Narrative    CT SCAN OF THE HEAD WITHOUT CONTRAST   10/17/2019 2:41 PM     HISTORY: Altered mental status (LOC), unexplained    TECHNIQUE:  Axial images of the head and coronal reformations without  IV contrast material. Radiation dose for this scan was reduced using  automated exposure control, adjustment of the mA and/or kV according  to patient size, or iterative reconstruction technique.    COMPARISON: MR scan dated 9/5/2018    FINDINGS:     Intracranial contents: There is diffuse parenchymal volume loss.   White matter changes are present in the cerebral hemispheres that are  consistent with small vessel ischemic disease in this age patient.  There is a chronic lacunar type infarct in the globus pallidus region.  This was also present on the previous MR scan. There is no evidence of  intracranial hemorrhage, mass, acute infarct or anomaly.    Visualized orbits/sinuses/mastoids:  The visualized portions of the  sinuses and mastoids appear normal.    Osseous structures/soft tissues:  There is no evidence of trauma.      Impression    IMPRESSION:   1. No acute pathology. No bleed, mass, or areas of acute infarction  are identified.  2. There is diffuse parenchymal volume loss.  White matter changes are  present in the cerebral hemispheres that are consistent with small  vessel ischemic disease in this age patient..  3. Chronic lacunar type infarct in the region of the left globus  pallidus.      MEET MIMS MD        _____________________________________________________________________________      _____________________________________________________________________________          ASSESSMENT     1. Altered mental status- Mild encephalopathy related to hypoglycemia and recent dialysis.        RECOMMENDATIONS   1. Watchful observation.

## 2019-10-18 NOTE — PROGRESS NOTES
Renal Medicine Progress Note                                Donald Raza MRN# [id number]   Age: 69 year old YOB: 1950   Date of Admission: 10/15/2019 Hospital LOS: 3                  Assessment/Plan:     Admitted with CP, SOB, hypertensive urgency and fever        1.  ESRD              -[facility]              -Dr. Dominik Chavez              -AVF              -target weight 88.5              -off 10/12/19 at 90.2 kg  2.  Anemia              -MIHAI  3.  Mineral Bone Disease              -Hectorol              -last PTH 09/14/19 583 pg/ml  4.  Hypertensive urgency              -BP improved with UF      Dialysis 10/19/19  Dialysis orders entered      Interval History:     Up in chair at bedside  Lethargic  BP improved  1 liter NC O2 requirement      ROS:     GENERAL: NAD, No fever,chills  R: NEGATIVE for significant cough or SOB  CV: NEGATIVE for chest pain, palpitations  EXT: no change edema  ROS otherwise negative    Medications and Allergies:     Reviewed    Physical Exam:     Vitals were reviewed  Patient Vitals for the past 8 hrs:   BP Temp Temp src Pulse Heart Rate Resp SpO2 Weight   10/18/19 1100 129/63 -- -- 60 60 15 95 % --   10/18/19 1015 -- -- -- -- -- -- 95 % --   10/18/19 1013 -- -- -- -- 62 17 (!) 88 % --   10/18/19 1000 128/57 -- -- 60 61 26 (!) 89 % --   10/18/19 0929 -- -- -- -- 65 15 94 % --   10/18/19 0900 134/59 97.5  F (36.4  C) Axillary 60 61 10 97 % --   10/18/19 0811 -- -- -- -- -- 18 96 % --   10/18/19 0800 (!) 141/73 -- -- 62 61 14 97 % --   10/18/19 0700 (!) 148/75 -- -- 64 62 8 96 % --   10/18/19 0630 133/71 -- -- 69 66 15 98 % --   10/18/19 0600 (!) 140/68 -- -- 65 66 16 95 % --   10/18/19 0530 (!) 143/71 -- -- 67 63 17 96 % 90.1 kg (198 lb 10.2 oz)   10/18/19 0500 (!) 146/68 -- -- 70 68 13 95 % --   10/18/19 0430 133/74 -- -- 62 63 11 95 % --     I/O last 3 completed shifts:  In: 1038 [P.O.:930; I.V.:108]  Out: 2000 [Other:2000]    Vitals:    10/15/19 0645  10/16/19 0325 10/17/19 0700 10/17/19 0800   Weight: 90.5 kg (199 lb 8.3 oz) 90.3 kg (199 lb 1.2 oz) 89.5 kg (197 lb 5 oz) 89.5 kg (197 lb 5 oz)    10/18/19 0530   Weight: 90.1 kg (198 lb 10.2 oz)         GENERAL: awake, alert, follows  HEENT: NC/AT, PERRLA, EOMI, non icteric, pharynx moist without lesion  NECK: supple, no masses or adenopathy  RESP:  clear anteriorly  CV: RRR, normal S1 S2  ABDOMEN: soft, nontender, no HSM or masses and bowel sounds normal  MS: no clubbing, cyanosis   SKIN: clear without significant rashes or lesions  NEURO: speech normal and cranial nerves 2-12 intact  PSYCH: affect normal/bright  EXT: warm, no edema    Data:     Recent Labs   Lab 10/18/19  0546 10/16/19  0536 10/15/19  0305    135 130*   POTASSIUM 4.4 3.9 4.5   CHLORIDE 95 99 95   CO2 29 26 24   ANIONGAP 9 10 11   * 136* 279*   BUN 55* 65* 100*   CR 8.01* 8.34* 10.90*   GFRESTIMATED 6* 6* 4*   GFRESTBLACK 7* 7* 5*   PRABHA 9.4 9.5 9.4         Recent Labs   Lab 10/18/19  0546 10/16/19  0536 10/15/19  0305   CR 8.01* 8.34* 10.90*     Recent Labs   Lab 10/18/19  0546   ALBUMIN 3.1*     Recent Labs   Lab 10/18/19  0546 10/16/19  0536 10/15/19  0305   PHOS  --  6.6*  --    HGB 9.0* 8.9* 9.7*         G David Fuller MD    [facility]  [phone]

## 2019-10-18 NOTE — PLAN OF CARE
ICU End of Shift Summary.  For vital signs and complete assessments, please see documentation flowsheets.     Pertinent assessments: Patient alert and confused at beginning of shift; now A&O. Tele SR, paced at times. Hypertension at beginning of shift; PRN IV hydralazine given x1 with SBP < 150 since. Afebrile. LS coarse/dim on 2 LPM per NC. Continent BM x3 overnight. Incontinent urine in brief x1; anuric otherwise. C/o headache pain; PRN acetaminophen given x1 with some relief.    Major Shift Events: BG 67 at 0400, no lethargy noted; glucose gel given with recheck recheck 107. Patient ate box lunch at approx 2100 and 0430. Dry mouth; asking for sips of water frequently. Slept approx 1 hour overnight.   Plan (Upcoming Events): Wean O2 as able. Monitor BG. ? Change of Lantus dosing    Discharge/Transfer Needs: TBD     Bedside Shift Report Completed :   Bedside Safety Check Completed:

## 2019-10-18 NOTE — PROGRESS NOTES
[facility]  Hospitalist Progress Note    Admit Date:  10/15/2019  Date of Service (when I saw the patient): 10/18/2019   Provider:  Barbara Enriquez DO    Summary of stay:  Donald Raza is a 69 year old male who was admitted on 10/15/2019. He has a complicated medical history such as HIV stable on antiretroviral, CAD with prior PCI, ESRD on maintenance hemodialysis, (Tuesday Thursday, Saturday schedule), with last session last Saturday as scheduled, insulin requiring diabetes mellitus type 2, TIA, hypertension, dyslipidemia, chronic anemia, prior pacemaker insertion for bradycardia, numerous hospitalizations in the past for atypical chest pain and severe uncontrolled hypertension, prior documented intermittent noncompliance and sometimes missing hemodialysis sessions.  However Donald stated that he has been compliant with all these medications and dialysis sessions and he was in his usual state of health until few hours prior to this emergency room presentation when he started to complaint of not feeling well with accompanying diaphoresis, sensation of shortness of breath, chest discomfort and feeling bloated a few hours duration    Assessment & Plan     1.  Hypertensive emergency/urgency and altered MS  # BP improved weaned off iv nitroglycerin gtt since yestererday  Home meds resumed: imdure 120mg/d, norvasc 5mg bid, coreg 25mg po bid, avapro 300mg/d and  clonidine 0.1mg restarted Daily (could be having some rebound htn as pharmacy notes states he was taking cloinidine several times a week PTA). Patient needs to understand needs to take it daily  - BP now well controlled  # Altered MS yesterday was probably multifactorial (BP treatment, one time low BG , meds and renal insufficiency  - stopped gabapentin, hold Remeron and clonazepam   - mental status back to baseline  - had head CT and neuro evaluation done  - could restart the meds hold tomorrow at lower dose     2.  ESRD  On hemodialysis  3x's/wk  HD today  Appreciate Nephrology co-management    3.  Detectable troponin I levels   secondary to #1 in the setting of ESRD  No having any CP symptoms  Does have a history of CAD with prior PCI  On daily asa 81mg daily    4.  Chronic thrombocytopenia and anemia  No active s/s of bleeding or concerning bleeding  Continue to monitor     5. DM - insulin requiring  Decreased dose of bid lantus (9 units instead in am and 6u at bedtime. On 24 unit(s) PTA)  Home tid lispro on hold  Decrease lantus as above  ssi prn    8.  History of noncompliance  9.  Known HIV on HAART  No changes in home meds    10.  Dyslipidemia  On home statin    11.  Weakness and deconditioning  Will likely benefit from PT/OT     Diet: Snacks/Supplements Adult: Nepro Oral Supplement; With Meals  Dialysis Diet    DVT Prophylaxis: Heparin SQ  Christie Catheter: not present  Code Status: Full Code      Disposition Plan   Expected discharge: 2 - 3 days, recommended to TCU vs. home once blood pressure within goal range.  Entered: Lex Alamo MD 10/18/2019, 1:04 PM       The patient's care was discussed with the Bedside Nurse, Care Coordinator/, Patient and intensivist  Consultant.    Interval History    Awake and alert, no new symptom, MS back to baseline    -Data reviewed today: I reviewed all new labs and imaging results over the last 24 hours. I personally reviewed no images or EKG's today.    Physical Exam   Temp: 97.5  F (36.4  C) Temp src: Axillary BP: 134/64 Pulse: 60 Heart Rate: 60 Resp: 16 SpO2: 95 % O2 Device: Nasal cannula Oxygen Delivery: 1 LPM  Vitals:    10/17/19 0700 10/17/19 0800 10/18/19 0530   Weight: 89.5 kg (197 lb 5 oz) 89.5 kg (197 lb 5 oz) 90.1 kg (198 lb 10.2 oz)     Vital Signs with Ranges  Temp:  [97.5  F (36.4  C)-98.9  F (37.2  C)] 97.5  F (36.4  C)  Pulse:  [60-74] 60  Heart Rate:  [60-76] 60  Resp:  [8-28] 16  BP: (106-170)/() 134/64  SpO2:  [86 %-98 %] 95 %  I/O last 3 completed  shifts:  In: 1038 [P.O.:930; I.V.:108]  Out: 2000 [Other:2000]    GEN:  Awake and alert   HEENT:  Normocephalic/atraumatic, no scleral icterus,   NECK:  No meningeal sign  CV:  Regular rate and rhythm, SM,. S1 + S2 noted, no S3 or S4.  LUNGS:  Clear to auscultation ant/lat bilaterally.  Somewhat diminished at the bases bilaterally to limited posterior exam - but no rales/rhonchi/wheezing auscultated bilaterally.  No costal retractions bilaterally.  Symmetric chest rise on inhalation noted.  ABD:  Active bowel sounds, soft, non-tender, no real distension.  No rebound/guarding/rigidity.  No masses palpated.  No obvious HSM to exam.  EXT:  Trace to 1+ pitting pretibial edema bilaterally, no cyanosis bilaterally. No joint synovitis noted.  No calf-tenderness or asymmetry noted.  SKIN:  Dry to touch, no rashes or jaundice noted.  PSYCH:  Cannot assess  NEURO:  Alert, oriented 3x, non focal    Data   Labs:  No results for input(s): CULT in the last 168 hours.  Recent Labs   Lab 10/18/19  0546 10/16/19  0536 10/15/19  0305    135 130*   POTASSIUM 4.4 3.9 4.5   CHLORIDE 95 99 95   CO2 29 26 24   ANIONGAP 9 10 11   * 136* 279*   BUN 55* 65* 100*   CR 8.01* 8.34* 10.90*   GFRESTIMATED 6* 6* 4*   GFRESTBLACK 7* 7* 5*   PRABHA 9.4 9.5 9.4     Recent Labs   Lab 10/18/19  0546 10/16/19  0536 10/15/19  0305   WBC 5.1 5.8 5.2   HGB 9.0* 8.9* 9.7*   HCT 31.0* 29.3* 31.4*   MCV 95 94 94   * 128* 120*      Recent Imaging:   Recent Results (from the past 24 hour(s))   CT Head w/o Contrast    Narrative    CT SCAN OF THE HEAD WITHOUT CONTRAST   10/17/2019 2:41 PM     HISTORY: Altered mental status (LOC), unexplained    TECHNIQUE:  Axial images of the head and coronal reformations without  IV contrast material. Radiation dose for this scan was reduced using  automated exposure control, adjustment of the mA and/or kV according  to patient size, or iterative reconstruction technique.    COMPARISON: MR scan dated  9/5/2018    FINDINGS:     Intracranial contents: There is diffuse parenchymal volume loss.   White matter changes are present in the cerebral hemispheres that are  consistent with small vessel ischemic disease in this age patient.  There is a chronic lacunar type infarct in the globus pallidus region.  This was also present on the previous MR scan. There is no evidence of  intracranial hemorrhage, mass, acute infarct or anomaly.    Visualized orbits/sinuses/mastoids:  The visualized portions of the  sinuses and mastoids appear normal.    Osseous structures/soft tissues:  There is no evidence of trauma.      Impression    IMPRESSION:   1. No acute pathology. No bleed, mass, or areas of acute infarction  are identified.  2. There is diffuse parenchymal volume loss.  White matter changes are  present in the cerebral hemispheres that are consistent with small  vessel ischemic disease in this age patient..  3. Chronic lacunar type infarct in the region of the left globus  pallidus.      MEET MIMS MD       Medications       - MEDICATION INSTRUCTIONS for Dialysis Patients -   Does not apply See Admin Instructions     abacavir  600 mg Oral QPM     amLODIPine  5 mg Oral BID     aspirin  81 mg Oral QPM     atorvastatin  40 mg Oral At Bedtime     carvedilol  25 mg Oral BID w/meals     cinacalcet  30 mg Oral Once per day on Tue Thu Sat     cloNIDine  0.1 mg Oral BID     dapsone  100 mg Oral At Bedtime     darunavir  800 mg Oral At Bedtime     dolutegravir  50 mg Oral At Bedtime     heparin  5,000 Units Subcutaneous Q12H     insulin aspart  1-10 Units Subcutaneous TID AC     insulin aspart  1-7 Units Subcutaneous At Bedtime     insulin glargine  6 Units Subcutaneous At Bedtime     [START ON 10/19/2019] insulin glargine  9 Units Subcutaneous QAM AC     irbesartan  300 mg Oral At Bedtime     isosorbide mononitrate  120 mg Oral QPM     magnesium oxide  400 mg Oral At Bedtime     multivitamin RENAL  1 capsule Oral At Bedtime      omega-3 acid ethyl esters  2 g Oral BID     pantoprazole  40 mg Oral Daily     ritonavir  100 mg Oral At Bedtime     sucroferric oxyhydroxide  500 mg Oral TID w/meals     umeclidinium  1 puff Inhalation Daily

## 2019-10-18 NOTE — PLAN OF CARE
ICU End of Shift Summary.  For vital signs and complete assessments, please see documentation flowsheets.     Pertinent assessments: Pt alert and oriented to person and place. VSS. Tele SR - sometimes paced. Weaned O2 1LPM to keep sats >92%. Lungs diminished. BP stable. Afebrile. Updated MD on lower BG overnight. HS Lantus changed. Eating well for breakfast, refused lunch, only had lemonade. Very lethargic and sleeping most of shift. Up to chair for most of morning. Ext. A2 with walker. PT ordered. No BM or urine this shift. POC reviewed with pt and wife.   Major Shift Events: Transfer to floor  Plan (Upcoming Events): Transfer to floor  Discharge/Transfer Needs: Transfer to floor    Bedside Shift Report Completed : yes  Bedside Safety Check Completed: yes

## 2019-10-18 NOTE — PLAN OF CARE
ICU End of Shift Summary.  For vital signs and complete assessments, please see documentation flowsheets.     Pertinent assessments: patient lethargic this am, having dialysis this am, given some juice, meds held due to dialysis, vss, accucheck low, juice given, tolerated dialysis, became  more lethargic and somnolent post dialysis, given some juice and po meds, no difficulty with swallow noted, more somnolent, went to CT toelrated well, Accucheck dipped below 70, given 25 of D50, more responsive, repeated 40 minutes later,  alert, oriented naidu cms intact.  Cont with fluids, juice, f/up with accuchecks, perrtl early am and throughout day, tongue protrusion midline hand grasp equal, responded to commands  Major Shift Events: as accuccheck drop, becomes more lethargic somnolent  Plan (Upcoming Events): threshold minimium for low accucheck is 100, normally runs over 120 + at home post dialysis, next dialysis scheduled for Sat.  Discharge/Transfer Needs: tbd    Bedside Shift Report Completed :   Bedside Safety Check Completed:

## 2019-10-19 NOTE — PROGRESS NOTES
Potassium   Date Value Ref Range Status   10/18/2019 4.4 3.4 - 5.3 mmol/L Final     Hemoglobin   Date Value Ref Range Status   10/18/2019 9.0 (L) 13.3 - 17.7 g/dL Final     Creatinine   Date Value Ref Range Status   10/18/2019 8.01 (H) 0.66 - 1.25 mg/dL Final     Urea Nitrogen   Date Value Ref Range Status   10/18/2019 55 (H) 7 - 30 mg/dL Final     Sodium   Date Value Ref Range Status   10/18/2019 133 133 - 144 mmol/L Final     INR   Date Value Ref Range Status   05/13/2019 1.10 0.86 - 1.14 Final       DIALYSIS PROCEDURE NOTE  Hepatitis status of previous patient on machine log was checked and verified ok to use with this patients hepatitis status.  Patient dialyzed for 2.5 hrs. on a 3 K bath with a net fluid removal of  2.5L.  A BFR of 400 ml/min was obtained via a LAVF using 15 gauge needles. Total heparin received during the treatment: 0 units.   Needle cannulation sites held x 10 min, pressure dressings applied after hemostasis.    Meds  Given:Hectorol and Epogen. Complications: none.  Procedure education provided orally to patient, patient verbalized understanding.  ICEBOAT? Timeout performed pre-treatment  I: Patient was identified using 2 identifiers  C: Consent obtained/verified current before treatment  E: Equipment preventative maintenance is current and dialysis delivery system OK to use  B: Hepatitis B Surface Antigen: non-reactive; Draw Date: 1/29/19      Hepatitis B Surface Antibody: immune; Draw Date: 1/29/19  O: Dialysis orders present and complete prior to treatment  A: Vascular access verified and assessed prior to treatment  T: Treatment was performed at a clinically appropriate time  ?: Patient was allowed to ask questions and address concerns prior to treatment  See flowsheet in EPIC for further details and post assessment.  Machine water alarm in place and functioning. Transducer pods intact and checked every 15min.  Pt ran in his ICU room.   Report received from: YANNA Pacheco rN  Report given to:  YANNA Pacheco RN  Chlorine/Chloramine water system checked every 4 hours.  Outpatient Dialysis at [facility].

## 2019-10-19 NOTE — PROGRESS NOTES
"   10/19/19 8844   Quick Adds   Type of Visit Initial PT Evaluation   Living Environment   Lives With spouse;sibling(s)   Living Arrangements house   Home Accessibility stairs to enter home;stairs within home   Number of Stairs, Main Entrance 3   Stair Railings, Main Entrance railing on left side (ascending)   Number of Stairs, Within Home, Primary 7   Stair Railings, Within Home, Primary railing on left side (ascending)   Transportation Anticipated family or friend will provide   Self-Care   Usual Activity Tolerance moderate   Current Activity Tolerance fair   Equipment Currently Used at Home cane, straight;walker, rolling  (walker- rarely uses)   Activity/Exercise/Self-Care Comment Has been using a walker since previous admission   Functional Level Prior   Ambulation 1-->assistive equipment  (straight cane)   Transferring 1-->assistive equipment   Toileting 0-->independent   Bathing 1-->assistive equipment   Fall history within last six months yes  (pt reports he lost balance but did not fall)   Number of times patient has fallen within last six months 1   Which of the above functional risks had a recent onset or change? ambulation;transferring;bathing;toileting;dressing  (activity tolerance)   General Information   Onset of Illness/Injury or Date of Surgery - Date 10/15/19   Referring Physician Lex Alamo MD   Patient/Family Goals Statement \"get my power back\"   Pertinent History of Current Problem (include personal factors and/or comorbidities that impact the POC) Donald Raza is a 69 year old male who was admitted on 10/15/2019. He has a complicated medical history such as HIV stable on antiretroviral, CAD with prior PCI, ESRD on maintenance hemodialysis, (Tuesday Thursday, Saturday schedule), with last session last Saturday as scheduled, insulin requiring diabetes mellitus type 2, TIA, hypertension, dyslipidemia, chronic anemia, prior pacemaker insertion for bradycardia, numerous " hospitalizations in the past for atypical chest pain and severe uncontrolled hypertension, prior documented intermittent noncompliance and sometimes missing hemodialysis sessions.  However Donald stated that he has been compliant with all these medications and dialysis sessions and he was in his usual state of health until few hours prior to this emergency room presentation when he started to complaint of not feeling well with accompanying diaphoresis, sensation of shortness of breath, chest discomfort and feeling bloated a few hours duration   Precautions/Limitations fall precautions   Weight-Bearing Status - LLE full weight-bearing   Weight-Bearing Status - RLE full weight-bearing   Cognitive Status Examination   Orientation orientation to person, place and time   Level of Consciousness alert   Follows Commands and Answers Questions 100% of the time   Personal Safety and Judgment intact   Memory intact   Pain Assessment   Patient Currently in Pain Yes, see Vital Sign flowsheet  (reporting headache)   Integumentary/Edema   Integumentary/Edema no deficits were identifed   Posture    Posture Forward head position;Protracted shoulders   Range of Motion (ROM)   ROM Comment ROM grossly WFL   Strength   Strength Comments Strength not formally assessed, pt appears to have some functional weakness with transfer, needs extra time, does demonstrate anti-gravity strength   Bed Mobility   Bed Mobility Comments Not assessed as session begins/ends in chair   Transfer Skills   Transfer Comments sit<>stand with modA    Gait   Gait Comments Paulette with FWW   Balance   Balance Comments Requires B UE support for safe dynamic mobility   Sensory Examination   Sensory Perception Comments Baseline numbness to B feet and lower legs   Coordination   Coordination no deficits were identified   Muscle Tone   Muscle Tone no deficits were identified   General Therapy Interventions   Planned Therapy Interventions balance training;bed mobility  "training;gait training;strengthening;stretching;transfer training;home program guidelines;progressive activity/exercise   Clinical Impression   Criteria for Skilled Therapeutic Intervention yes, treatment indicated   PT Diagnosis Impaired functional mobility   Influenced by the following impairments Weakness, deconditioning/decreased activity tolerance, impaired balance   Functional limitations due to impairments Difficulty with bed mobility, transfers, ambulation, stairs   Clinical Presentation Stable/Uncomplicated   Clinical Presentation Rationale clear medical POC, progressing medically   Clinical Decision Making (Complexity) Low complexity   Therapy Frequency 5x/week   Predicted Duration of Therapy Intervention (days/wks) 1 week   Anticipated Discharge Disposition Transitional Care Facility   Risk & Benefits of therapy have been explained Yes   Patient, Family & other staff in agreement with plan of care Yes   [facility] \"6 Clicks\"   2016, Trustees of [facility], under license to [employer].  All rights reserved.   6 Clicks Short Forms Basic Mobility Inpatient Short Form   [facility]  \"6 Clicks\" V.2 Basic Mobility Inpatient Short Form   1. Turning from your back to your side while in a flat bed without using bedrails? 3 - A Little   2. Moving from lying on your back to sitting on the side of a flat bed without using bedrails? 2 - A Lot   3. Moving to and from a bed to a chair (including a wheelchair)? 2 - A Lot   4. Standing up from a chair using your arms (e.g., wheelchair, or bedside chair)? 2 - A Lot   5. To walk in hospital room? 3 - A Little   6. Climbing 3-5 steps with a railing? 2 - A Lot   Basic Mobility Raw Score (Score out of 24.Lower scores equate to lower levels of function) 14   Total Evaluation Time   Total Evaluation Time (Minutes) 8     "

## 2019-10-19 NOTE — PLAN OF CARE
"PT: Orders received. PT evaluation completed and treatment initiated. Pt reports living in a house with 2-3 steps to enter and 7 stairs to main living area. Pt lives with a spouse and brother and reports that they can help \"some\". Pt has been ambulating with a walker since previous admission, but prior to that the pt was using a cane. Pt reports receiving home cares prior to this admission.     Discharge Planner PT   Patient plan for discharge: Home  Current status: Pt sitting in bedside chair upon initiation, educated in PT role and POC, agreeable to session. Pt attempts sit<>Stand without any assist and is unable to unweight bottom. Completes sit<>Stand with modA and cues/encouragement for technique. Pt fatigued upon standing. Requires short standing rest break before trialing any further mobility. Pt able to ambulate forward/backward ~3' with use of FWW and Marcus. Very slow gait speed. Pt completes marching in place to improve activity tolerance, but only able to march for ~15 seconds, with low step height. Pt requires a seated rest break after marching and is unable to tolerate any further standing/mobility tasks. Significant cuing for slow descent to chair. Pt reports high fatigue levels upon sitting, and reports frustration. Pt educated in the effects of inactivity, and also how PT/RN staff will work toward improving strength/endurance. Pt satisfied at end of session.   Barriers to return to prior living situation: Decreased activity tolerance, requires A for basic mobility skills, fall risk, stairs to enter/within home  Recommendations for discharge: TCU  Rationale for recommendations: Pt is not currently at baseline for mobility, and is unsafe to discharge home. With continued PT, both IP and after discharge, pt is likely to obtain mobility goals.        Entered by: Daisy Puente 10/19/2019 2:07 PM       "

## 2019-10-19 NOTE — PROGRESS NOTES
[facility]    Medicine Progress Note - Hospitalist Service       Date of Admission:  10/15/2019  Assessment & Plan      1.  Hypertensive emergency/urgency and altered MS  # BP improved weaned off iv nitroglycerin gtt   Home meds resumed: imdure 120mg/d, norvasc 5mg bid, coreg 25mg po bid, avapro 300mg/d and  clonidine 0.1mg restarted Daily (could be having some rebound htn as pharmacy notes states he was taking cloinidine several times a week PTA). Patient needs to understand needs to take it daily  - BP now well controlled    # Altered MS 10/18 was probably multifactorial (BP treatment, one time low BG , meds and renal insufficiency  - stopped gabapentin, hold Remeron and clonazepam   - mental status back to baseline  - had head CT and neuro evaluation done  - could restart the meds hold tomorrow at lower dose     2.  ESRD  On hemodialysis 3x's/wk  HD today  Appreciate Nephrology co-management    3.  Detectable troponin I levels   secondary to #1 in the setting of ESRD  No having any CP symptoms  Does have a history of CAD with prior PCI  On daily asa 81mg daily    4.  Chronic thrombocytopenia and anemia  No active s/s of bleeding or concerning bleeding  Continue to monitor     5. DM - insulin requiring  Decreased dose of bid lantus (9 units instead in am and 6u at bedtime. On 24 unit(s) PTA)  Home tid lispro on hold  Decrease lantus as above  ssi prn    8.  History of noncompliance  9.  Known HIV on HAART  No changes in home meds    10.  Dyslipidemia  On home statin    11.  Weakness and deconditioning  Will likely benefit from PT/OT          Diet: Snacks/Supplements Adult: Nepro Oral Supplement; With Meals  Dialysis Diet    DVT Prophylaxis: Heparin SQ  Christie Catheter: not present  Code Status: Full Code      Disposition Plan   Expected discharge: 2 - 3 days, recommended to prior living arrangement once mental status at baseline.  Entered: Joshua Diaz MD 10/19/2019, 9:59 AM       The patient's  care was discussed with the Bedside Nurse and Patient.    Joshua Diaz MD  Hospitalist Service  [facility]    ______________________________________________________________________    Interval History     No fevers/chills/chest pain/SOB.    Data reviewed today: I reviewed all medications, new labs and imaging results over the last 24 hours. I personally reviewed no images or EKG's today.    Physical Exam   Vital Signs: Temp: 97.6  F (36.4  C) Temp src: Oral BP: 131/67 Pulse: 60 Heart Rate: 60 Resp: 18 SpO2: 96 % O2 Device: None (Room air) Oxygen Delivery: 1 LPM  Weight: 198 lbs 10.15 oz    Gen - Alert, awake, oriented to self.  Lungs - CTA B.  Heart - RR,S1+S2 nml, no m/g/r.  Abd - soft, NT, ND, + BS.  Ext - no edema, + AVF LUE.    Data   Recent Labs   Lab 10/18/19  0546 10/16/19  0536 10/15/19  1824 10/15/19  1343 10/15/19  0305   WBC 5.1 5.8  --   --  5.2   HGB 9.0* 8.9*  --   --  9.7*   MCV 95 94  --   --  94   * 128*  --   --  120*    135  --   --  130*   POTASSIUM 4.4 3.9  --   --  4.5   CHLORIDE 95 99  --   --  95   CO2 29 26  --   --  24   BUN 55* 65*  --   --  100*   CR 8.01* 8.34*  --   --  10.90*   ANIONGAP 9 10  --   --  11   PRABHA 9.4 9.5  --   --  9.4   * 136*  --   --  279*   ALBUMIN 3.1*  --   --   --   --    PROTTOTAL 7.0  --   --   --   --    BILITOTAL 0.5  --   --   --   --    ALKPHOS 76  --   --   --   --    ALT 12  --   --   --   --    AST 15  --   --   --   --    TROPI  --   --  0.172* 0.138* 0.046*     No results found for this or any previous visit (from the past 24 hour(s)).  Medications     heparin (porcine)         - MEDICATION INSTRUCTIONS for Dialysis Patients -   Does not apply See Admin Instructions     abacavir  600 mg Oral QPM     amLODIPine  5 mg Oral BID     aspirin  81 mg Oral QPM     atorvastatin  40 mg Oral At Bedtime     carvedilol  25 mg Oral BID w/meals     cinacalcet  30 mg Oral Once per day on Tue Thu Sat     cloNIDine  0.1 mg Oral BID      dapsone  100 mg Oral At Bedtime     darunavir  800 mg Oral At Bedtime     dolutegravir  50 mg Oral At Bedtime     heparin (porcine)  500 Units Hemodialysis Machine Once in dialysis     heparin  5,000 Units Subcutaneous Q12H     insulin aspart  1-10 Units Subcutaneous TID AC     insulin aspart  1-7 Units Subcutaneous At Bedtime     insulin glargine  6 Units Subcutaneous At Bedtime     insulin glargine  9 Units Subcutaneous QAM AC     irbesartan  300 mg Oral At Bedtime     isosorbide mononitrate  120 mg Oral QPM     magnesium oxide  400 mg Oral At Bedtime     multivitamin RENAL  1 capsule Oral At Bedtime     omega-3 acid ethyl esters  2 g Oral BID     pantoprazole  40 mg Oral Daily     ritonavir  100 mg Oral At Bedtime     sucroferric oxyhydroxide  500 mg Oral TID w/meals     umeclidinium  1 puff Inhalation Daily

## 2019-10-19 NOTE — PLAN OF CARE
ICU End of Shift Summary.  For vital signs and complete assessments, please see documentation flowsheets.     Pertinent assessments: Patient is alert and forgetful; oriented to person and place. VSS. Tele SR with 1st deg AVB, occ paced. BP WNL. LS dim on 1 LPM per NC. Afebrile. Anuric. C/o headache pain; PRN acetaminophen given with little results; now with warm towels to head.    Major Shift Events: Patient sleepy this evening after visitors left.    Plan (Upcoming Events): Continue plan of care.   Discharge/Transfer Needs: TBD    Bedside Shift Report Completed :   Bedside Safety Check Completed:

## 2019-10-19 NOTE — PLAN OF CARE
ICU End of Shift Summary.  For vital signs and complete assessments, please see documentation flowsheets.     Pertinent assessments: VSS. Disoriented to time and situation. LS diminished on 1L NC. Tele SR 1AVB, occasionally paced at 60bpm. BP stable, no PRNs needed. BS x4, no BM this shift. Anuric - HD pt. Pt slept most of shift. BG stable.  Major Shift Events: slept well  Plan (Upcoming Events): HD today  Discharge/Transfer Needs: TOF pt, can trx anytime    Bedside Shift Report Completed :   Bedside Safety Check Completed:

## 2019-10-19 NOTE — PLAN OF CARE
ICU End of Shift Summary.  For vital signs and complete assessments, please see documentation flowsheets.     Pertinent assessments: Very pleasant, alert and oriented to person, place, situation. Disoriented to time. VSS. Tele - SR/paced. BP controlled with oral medications. 95% RA. Lungs diminished. Denies SOB. Headache after HD today not controlled with tylenol and ice pack. MD notified and Norco given with relief.  A1-2 to transfer with walker/gaitbelt. Increased weakness after HD today. Small BM. No urine output. POC reviewed with pt.   Major Shift Events: 2.5 L off with HD  Plan (Upcoming Events): transfer to floor  Discharge/Transfer Needs: transfer to floor    Bedside Shift Report Completed : yes  Bedside Safety Check Completed: yes

## 2019-10-20 NOTE — PROGRESS NOTES
[facility]    Medicine Progress Note - Hospitalist Service       Date of Admission:  10/15/2019  Assessment & Plan   1.  Hypertensive emergency/urgency and altered MS  # BP improved weaned off iv nitroglycerin gtt   Home meds resumed: imdure 120mg/d, norvasc 5mg bid, coreg 25mg po bid, avapro 300mg/d and  clonidine 0.1mg restarted Daily (could be having some rebound htn as pharmacy notes states he was taking cloinidine several times a week PTA). Patient needs to understand needs to take it daily  - BP now well controlled    # Altered MS 10/18 was probably multifactorial (BP treatment, one time low BG , meds and renal insufficiency  - stopped gabapentin, hold Remeron and clonazepam   - mental status back to baseline  - had head CT and neuro evaluation done  - could restart the meds hold tomorrow at lower dose     2.  ESRD  On hemodialysis 3x's/wk  HD today  Appreciate Nephrology co-management    3.  Detectable troponin I levels   secondary to #1 in the setting of ESRD  No having any CP symptoms  Does have a history of CAD with prior PCI  On daily asa 81mg daily    4.  Chronic thrombocytopenia and anemia  No active s/s of bleeding or concerning bleeding  Continue to monitor     5. DM - insulin requiring  Decreased dose of bid lantus (9 units instead in am and 6u at bedtime. On 24 unit(s) PTA)  Home tid lispro on hold  Decrease lantus as above  ssi prn    8.  History of noncompliance  9.  Known HIV on HAART  No changes in home meds    10.  Dyslipidemia  On home statin    11.  Weakness and deconditioning  Will likely benefit from PT/OT        Diet: Snacks/Supplements Adult: Nepro Oral Supplement; With Meals  Dialysis Diet    DVT Prophylaxis: Heparin SQ  Christie Catheter: not present  Code Status: Full Code      Disposition Plan   Expected discharge: 2 - 3 days, recommended to prior living arrangement once mental status at baseline.  Entered: Joshua Diaz MD 10/20/2019, 8:39 AM       The patient's care  was discussed with the Bedside Nurse and Patient.    Joshua Diaz MD  Hospitalist Service  [facility]    ______________________________________________________________________    Interval History     No fevers/chills/chest pain/SOB.    Data reviewed today: I reviewed all medications, new labs and imaging results over the last 24 hours. I personally reviewed no images or EKG's today.    Physical Exam   Vital Signs: Temp: 97.7  F (36.5  C) Temp src: Oral BP: (!) 151/72 Pulse: 60 Heart Rate: 60 Resp: 18 SpO2: 98 % O2 Device: None (Room air)    Weight: 198 lbs 10.15 oz    Gen - Alert, awake, oriented to self.  Lungs - CTA B.  Heart - RR,S1+S2 nml, no m/g/r.  Abd - soft, NT, ND, + BS.  Ext - no edema, + AVF LUE.    Data   Recent Labs   Lab 10/20/19  0542 10/18/19  0546 10/16/19  0536 10/15/19  1824 10/15/19  1343 10/15/19  0305   WBC 5.0 5.1 5.8  --   --  5.2   HGB 9.3* 9.0* 8.9*  --   --  9.7*   MCV 94 95 94  --   --  94   * 118* 128*  --   --  120*   * 133 135  --   --  130*   POTASSIUM 4.4 4.4 3.9  --   --  4.5   CHLORIDE 95 95 99  --   --  95   CO2 28 29 26  --   --  24   BUN 59* 55* 65*  --   --  100*   CR 7.69* 8.01* 8.34*  --   --  10.90*   ANIONGAP 7 9 10  --   --  11   PRABHA 9.5 9.4 9.5  --   --  9.4   * 158* 136*  --   --  279*   ALBUMIN  --  3.1*  --   --   --   --    PROTTOTAL  --  7.0  --   --   --   --    BILITOTAL  --  0.5  --   --   --   --    ALKPHOS  --  76  --   --   --   --    ALT  --  12  --   --   --   --    AST  --  15  --   --   --   --    TROPI  --   --   --  0.172* 0.138* 0.046*     No results found for this or any previous visit (from the past 24 hour(s)).  Medications       - MEDICATION INSTRUCTIONS for Dialysis Patients -   Does not apply See Admin Instructions     abacavir  600 mg Oral QPM     amLODIPine  5 mg Oral BID     aspirin  81 mg Oral QPM     atorvastatin  40 mg Oral At Bedtime     carvedilol  25 mg Oral BID w/meals     cinacalcet  30 mg Oral Once  per day on Tue Thu Sat     cloNIDine  0.1 mg Oral BID     dapsone  100 mg Oral At Bedtime     darunavir  800 mg Oral At Bedtime     dolutegravir  50 mg Oral At Bedtime     heparin (porcine)  500 Units Hemodialysis Machine Once in dialysis     heparin  5,000 Units Subcutaneous Q12H     insulin aspart  1-10 Units Subcutaneous TID AC     insulin aspart  1-7 Units Subcutaneous At Bedtime     insulin glargine  6 Units Subcutaneous At Bedtime     insulin glargine  9 Units Subcutaneous QAM AC     irbesartan  300 mg Oral At Bedtime     isosorbide mononitrate  120 mg Oral QPM     magnesium oxide  400 mg Oral At Bedtime     multivitamin RENAL  1 capsule Oral At Bedtime     omega-3 acid ethyl esters  2 g Oral BID     pantoprazole  40 mg Oral Daily     ritonavir  100 mg Oral At Bedtime     sucroferric oxyhydroxide  500 mg Oral TID w/meals     umeclidinium  1 puff Inhalation Daily

## 2019-10-20 NOTE — PLAN OF CARE
PT: attempted to see patient at 1000. RN reports patient just got back to bed and would benefit from sleep at this time.     Plan to re-attempt in PM as able.

## 2019-10-20 NOTE — PLAN OF CARE
Pt transferred from ICU this afternoon. VSS on PO BP meds. Tele A-paced and SR at times. A&Ox3 up with a1 walker. Monitoring BG's.  Pt had dialysis yesterday.

## 2019-10-20 NOTE — PLAN OF CARE
ICU End of Shift Summary.  For vital signs and complete assessments, please see documentation flowsheets.     Pertinent assessments: A&Ox4. VSS. 92% RA, lungs dim in bases otherwise clear. Denies pain. Tele SR/Paced. BP Controlled. A1 with walker to transfer. Up to chair for breakfast. Refused walking. Did not sleep well last night, sleeping most of morning after breakfast. POC reviewed with pt. Transfer to floor with all belongings.   **Pt was having pain in left Jaw/ear last night, slightly swollen. Updated MD this morning.     Major Shift Events: Transfer to floor   Plan (Upcoming Events): Transfer to floor   Discharge/Transfer Needs: Transfer to floor     Bedside Shift Report Completed : yes  Bedside Safety Check Completed: yes

## 2019-10-20 NOTE — PROGRESS NOTES
Assessment and Plan:   End-stage renal disease: He runs Tuesday Thursday Saturday.  Target weight is 88.5 kg.    His last dialysis was 10/19/2019.  He ran for 2.5 hours on a 3K bath with a net removal of 2.5 L.  He has a left arm fistula with 15-gauge needles and a blood flow rate of 400 was obtained.  No heparin was used.  He was given Hectorol and EPO during the run.    Labs today show sodium 130, potassium 4.4, bicarbonate 28, creatinine 7.69, calcium 9.5.  His hemoglobin is 9.3.      Interval History:   Anemia  Metabolic bone disease        Hypertension: His blood pressure today is reasonably controlled, he is on Norvasc 10 mg/day Coreg 25 mg twice a day, clonidine 0.1 mg twice a day, Avapro 300 mg/day, Imdur and 120 mg/day and labetalol 100 mg every 8 hours.  Diabetes  HIV on retroviral medications             Review of Systems:   He denies shortness of breath, nausea, vomiting.  He is taking p.o. well.  He denies headache, chest pain or abdominal pain.  His dialysis went well yesterday.          Medications:       - MEDICATION INSTRUCTIONS for Dialysis Patients -   Does not apply See Admin Instructions     abacavir  600 mg Oral QPM     amLODIPine  5 mg Oral BID     aspirin  81 mg Oral QPM     atorvastatin  40 mg Oral At Bedtime     carvedilol  25 mg Oral BID w/meals     cinacalcet  30 mg Oral Once per day on Tue Thu Sat     cloNIDine  0.1 mg Oral BID     dapsone  100 mg Oral At Bedtime     darunavir  800 mg Oral At Bedtime     dolutegravir  50 mg Oral At Bedtime     heparin (porcine)  500 Units Hemodialysis Machine Once in dialysis     heparin  5,000 Units Subcutaneous Q12H     insulin aspart  1-10 Units Subcutaneous TID AC     insulin aspart  1-7 Units Subcutaneous At Bedtime     insulin glargine  6 Units Subcutaneous At Bedtime     insulin glargine  9 Units Subcutaneous QAM AC     irbesartan  300 mg Oral At Bedtime     isosorbide mononitrate  120 mg Oral QPM     magnesium oxide  400 mg Oral At  Bedtime     multivitamin RENAL  1 capsule Oral At Bedtime     omega-3 acid ethyl esters  2 g Oral BID     pantoprazole  40 mg Oral Daily     ritonavir  100 mg Oral At Bedtime     sucroferric oxyhydroxide  500 mg Oral TID w/meals     umeclidinium  1 puff Inhalation Daily         Current active medications and PTA medications reviewed, see medication list for details.            Physical Exam:   Vitals were reviewed  Patient Vitals for the past 24 hrs:   BP Temp Temp src Pulse Heart Rate Resp SpO2   10/20/19 1303 129/63 96.7  F (35.9  C) Oral -- 60 20 91 %   10/20/19 1200 135/71 -- -- 60 60 28 --   10/20/19 1100 134/70 -- -- 60 60 18 --   10/20/19 1000 124/65 -- -- 60 60 12 --   10/20/19 0900 137/66 -- -- 61 60 21 --   10/20/19 0800 (!) 151/72 97.6  F (36.4  C) Axillary 60 60 18 92 %   10/20/19 0700 (!) 145/75 -- -- 60 59 17 --   10/20/19 0600 (!) 144/68 -- -- 60 62 17 --   10/20/19 0500 (!) 143/71 -- -- 60 60 17 --   10/20/19 0400 (!) 145/78 97.7  F (36.5  C) Oral 61 62 22 98 %   10/20/19 0300 (!) 147/75 -- -- 60 60 20 --   10/20/19 0200 139/67 -- -- 63 61 27 --   10/20/19 0100 (!) 149/79 -- -- 60 60 16 --   10/20/19 0000 (!) 153/79 -- -- 60 59 (!) 0 --   10/19/19 2300 (!) 151/84 -- -- 64 61 19 --   10/19/19 2200 139/65 -- -- 66 66 23 --   10/19/19 2100 (!) 151/82 -- -- 65 64 16 --   10/19/19 2000 (!) 140/61 97.8  F (36.6  C) Oral 66 65 23 96 %   10/19/19 1900 (!) 152/87 -- -- 65 61 13 --   10/19/19 1800 (!) 166/73 -- -- 62 70 20 --   10/19/19 1700 -- -- -- -- 62 20 --   10/19/19 1600 -- -- -- -- 59 22 --       Temp:  [96.7  F (35.9  C)-97.8  F (36.6  C)] 96.7  F (35.9  C)  Pulse:  [60-66] 60  Heart Rate:  [59-70] 60  Resp:  [0-28] 20  BP: (124-166)/(61-87) 129/63  SpO2:  [91 %-98 %] 91 %    Temperatures:  Current - Temp: 96.7  F (35.9  C); Max - Temp  Av.5  F (36.4  C)  Min: 96.7  F (35.9  C)  Max: 97.8  F (36.6  C)  Respiration range: Resp  Av.6  Min: 0  Max: 28  Pulse range: Pulse  Av.7  Min: 60   Max: 66  Blood pressure range: Systolic (24hrs), Av , Min:124 , Max:166   ; Diastolic (24hrs), Av, Min:61, Max:87    Pulse oximetry range: SpO2  Av.3 %  Min: 91 %  Max: 98 %    I/O last 3 completed shifts:  In: 730 [P.O.:730]  Out: 50 [Urine:50]      Intake/Output Summary (Last 24 hours) at 10/20/2019 1522  Last data filed at 10/20/2019 1415  Gross per 24 hour   Intake 730 ml   Output 50 ml   Net 680 ml       Alert and responsive  Left upper arm fistula with dressing in place, good bruit  Lower extremities no edema       Wt Readings from Last 4 Encounters:   10/18/19 90.1 kg (198 lb 10.2 oz)   19 90.7 kg (199 lb 15.3 oz)   19 92.1 kg (203 lb)   19 88.6 kg (195 lb 6.4 oz)          Data:          Lab Results   Component Value Date     10/20/2019     10/18/2019     10/16/2019    Lab Results   Component Value Date    CHLORIDE 95 10/20/2019    CHLORIDE 95 10/18/2019    CHLORIDE 99 10/16/2019    Lab Results   Component Value Date    BUN 59 10/20/2019    BUN 55 10/18/2019    BUN 65 10/16/2019      Lab Results   Component Value Date    POTASSIUM 4.4 10/20/2019    POTASSIUM 4.4 10/18/2019    POTASSIUM 3.9 10/16/2019    Lab Results   Component Value Date    CO2 28 10/20/2019    CO2 29 10/18/2019    CO2 26 10/16/2019    Lab Results   Component Value Date    CR 7.69 10/20/2019    CR 8.01 10/18/2019    CR 8.34 10/16/2019        Recent Labs   Lab Test 10/20/19  0542 10/18/19  0546 10/16/19  0536   WBC 5.0 5.1 5.8   HGB 9.3* 9.0* 8.9*   HCT 31.9* 31.0* 29.3*   MCV 94 95 94   * 118* 128*     Recent Labs   Lab Test 10/18/19  0546 19  0020 19  0427  14  2255   AST 15 18 11   < > 18   ALT 12 18 17   < > 17   ALKPHOS 76 123 174*   < > 132   BILITOTAL 0.5 0.4 0.5   < > 0.5   BILICONJ  --   --   --   --  0.0    < > = values in this interval not displayed.       Recent Labs   Lab Test 10/16/19  0536 10/15/19  0305 19  0427   MAG 2.3 2.7* 2.7*     Recent Labs    Lab Test 10/16/19  0536 07/27/19  0849 05/04/19  0605   PHOS 6.6* 7.3* 7.1*     Recent Labs   Lab Test 10/20/19  0542 10/18/19  0546 10/16/19  0536   PRABHA 9.5 9.4 9.5       Lab Results   Component Value Date    PRABHA 9.5 10/20/2019     Lab Results   Component Value Date    WBC 5.0 10/20/2019    HGB 9.3 (L) 10/20/2019    HCT 31.9 (L) 10/20/2019    MCV 94 10/20/2019     (L) 10/20/2019     Lab Results   Component Value Date     (L) 10/20/2019    POTASSIUM 4.4 10/20/2019    CHLORIDE 95 10/20/2019    CO2 28 10/20/2019     (H) 10/20/2019     Lab Results   Component Value Date    BUN 59 (H) 10/20/2019    CR 7.69 (H) 10/20/2019     Lab Results   Component Value Date    MAG 2.3 10/16/2019     Lab Results   Component Value Date    PHOS 6.6 (H) 10/16/2019       Creatinine   Date Value Ref Range Status   10/20/2019 7.69 (H) 0.66 - 1.25 mg/dL Final   10/18/2019 8.01 (H) 0.66 - 1.25 mg/dL Final   10/16/2019 8.34 (H) 0.66 - 1.25 mg/dL Final   10/15/2019 10.90 (H) 0.66 - 1.25 mg/dL Final   09/25/2019 9.31 (H) 0.66 - 1.25 mg/dL Final   09/24/2019 12.10 (H) 0.66 - 1.25 mg/dL Final       Attestation:  I have reviewed today's vital signs, notes, medications, labs and imaging.     Eric Bradford MD

## 2019-10-20 NOTE — PLAN OF CARE
ICU End of Shift Summary.  For vital signs and complete assessments, please see documentation flowsheets.     Pertinent assessments: VSS, disoriented to time. Tele SR, occasionally paced. No PRN antihypertensives needed this shift. LS diminished on RA. BS x4. No UOP this shift. Pt c/o left inner ear pain, norco given and warm washcloth applied - resolved.  Major Shift Events: ear pain, norco  Plan (Upcoming Events): cont current POC  Discharge/Transfer Needs: ready to transfer when bed available    Bedside Shift Report Completed : yes   Bedside Safety Check Completed: Yes

## 2019-10-21 NOTE — PLAN OF CARE
A/O   Assist x1/walker to transfer.  Refused supper  this evening.  + bruit LUE  .   VSS afebrile.    Tele Paced.  Blk stools this shift, see flow sheet for frequency.

## 2019-10-21 NOTE — PROGRESS NOTES
"An EKG was completed on the pt, with no complications noted during the procedure.    Vital signs:  Temp: 97.7  F (36.5  C) Temp src: Oral BP: 136/60   Heart Rate: 60 Resp: 18 SpO2: 90 % O2 Device: None (Room air) Oxygen Delivery: 1 LPM Height: 180.3 cm (5' 11\") Weight: 90.1 kg (198 lb 10.2 oz)  Estimated body mass index is 27.7 kg/m  as calculated from the following:    Height as of this encounter: 1.803 m (5' 11\").    Weight as of this encounter: 90.1 kg (198 lb 10.2 oz).    Past Medical History:   Diagnosis Date     Allergic rhinitis      Anemia due to chronic kidney disease 10/21/2011     CAD S/P percutaneous coronary angioplasty 6/15/2015     Cataract      CKD (chronic kidney disease)      Colon cancer (H)      COPD (chronic obstructive pulmonary disease) (H)      Depression      Dilated aortic root (H) 5/6/2016     Diverticulitis      ESRD (end stage renal disease) on dialysis (H) 5/6/2016     Gout      Human immunodeficiency virus (HIV) disease (H)      Hx of squamous cell carcinoma 03/23/2007     Hypertension 2010     Impotence of organic origin      Increased prostate specific antigen (PSA) velocity 08/08/2016    Awaiting bx on blood thinner     Mixed hyperlipidemia      Pulmonary HTN (H)     Mod     TIA (transient ischemic attack) 5/2016     Type 2 diabetes mellitus (H) age 50       Past Surgical History:   Procedure Laterality Date     ANGIOGRAM  03-04-16    No culprit lesions, stents widely patent      ANGIOGRAM  05-06-16    Cutting balloon ptca=Diag     APPENDECTOMY  2000     CHOLECYSTECTOMY, LAPOROSCOPIC       COLON SURGERY       COLOSTOMY  09/30/1999    Temporary for diverticulitis     EP PACEMAKER N/A 5/2/2019    Procedure: EP Pacemaker;  Surgeon: Angelique Perera MD;  Location:  HE[facility] CATH LAB     HC LEFT HEART CATHETERIZATION  03/12/2018    No significant change  Elevated LVEDp  LVEF 30% No PCI     HEART CATH, ANGIOPLASTY  08-18-16    LAD PCI. Stented with a 3.0 x 8 mm Xience " Migdalia stent.     IR DIALYSIS FISTULOGRAM LEFT  2019     STENT, CORONARY, S660 15/18  2015    VANITA=Diag, PTCA=LAD     STENT, CORONARY, S660 15/18  2015    VANITA=LAD       Family History   Problem Relation Age of Onset     Heart Disease Brother 40        CABG     Kidney Disease Sister      Hypertension Sister      Heart Disease Brother         Dilated aorta       Social History     Tobacco Use     Smoking status: Former Smoker     Packs/day: 2.00     Years: 41.00     Pack years: 82.00     Types: Cigarettes     Last attempt to quit: 2003     Years since quittin.8     Smokeless tobacco: Never Used   Substance Use Topics     Alcohol use: No     Alcohol/week: 0.0 standard drinks     Pepe Whitfield, RT  10/21/2019

## 2019-10-21 NOTE — PROVIDER NOTIFICATION
Provider paged: Please call regarding this patient, states dizziness, sob, nausea, VSS, denies chest pain.

## 2019-10-21 NOTE — PROGRESS NOTES
[facility]    Hospitalist Progress Note      Assessment & Plan   Donald Raza is a 69 year old male who was admitted on 10/15/2019 for further work up and monitoring after presenting the the ED with chest discomfort, elevated blood pressures, feeling unwell with diaphoresis, and feeling bloated. He has an extensive medical history including ESRD on maintenance hemodialysis, CAD with prior PCI, HIV stable on antiretroviral therapy, insulin requiring diabetes mellitus type 2, TIA, hypertension, bradycardia s/p pacemaker insertion. He ultimately was admitted to the ICU for care and transferred to the floor 10/20/2019 after being weaned off IV nitroglycerin drip with improved blood pressure control.    1.  Hypertensive emergency/urgency  Blood pressures 124-162/63-74 oral home medications yesterday resumed 10/20, after discontinuation of nitroglycerin drip. Patient is feeling more tired today and I suspect in some part due to the fact that his pressures operate 160-170's systolic at home.   -Resume home oral medications: Norvasc 5mg BID, Coreg 25mg po BID, Irbesartan 300 mg/day, Imdur 120mg/d with hold parameters   -Bladder scan negative for potential urinary retention contributing to lower than baseline blood pressures  -Gentle IV fluids to improve status in the setting of lower than patient's relative hypotension  -Change Clonidine 0.1 mg to once daily prn    2. Altered mental status   Mental status improved from admission. Presumed to be multifactorial given elevated blood pressures, incident of hypoglycemia, medications, renal insufficiency.  He was dialyzed 10/19. Head CT revealed no acute pathology to explain symptoms. Per nursing staff increased somnolence with blood sugars under 90, thus insulin adjusted.   -Neurology following    -Continue hold of oral gabapentin, pain, clonazepam due to increased tiredness today.     3.  ESRD on dialysis  Patient is mostly anuric with baseline incontinence,  today complains of urge to void. Hemodialysis -Tuesday Thursday Saturday with target weight 88.5 kg, left arm fistula.  Was most recently dialyzed 10/19, received Hectorol and EPO during. Creatinine 7.69, down from admission 10.9.   -Nephrology following, dialysis for tomorrow       4.  CAD, Hyperlipidemia   Detectable troponin levels on admission. No CP symptoms. Presumed to be secondary in the setting of ESRD.   NSTEMI in 2015 s/p stenting to LAD, repeat LAD/diagonal stenting due to in-stent restenosis (08/2016), most recent coronary angiogram in 03/018 with patent stents. Follows with Dr. Diaz of cardiology and until recently was lost to f/u. Last seen by cardiology in 08/2019 where his Plavix was stopped. Next follow up in clinic 02/2020.  - Continue PTA ASA 81 mg and Atorvastatin        5. Abdominal discomfort, bloating  Patient is complaining of uncomfortable 'gas like bloating' today with some nursing reports of darker stools over the past 24 hours, per patient they are usually dark in color and this is not a change for him. He has baseline constipation at home, has not noticed change in stool character prior to admission. Denies difficulty with hemorrhoids.   -Will continue to monitor, stool softeners prn      7 .  Chronic thrombocytopenia and anemia of chronic disease  Hemoglobin stable, baseline 8.2-9.2. No concern of acute bleed or indication for transfusion at this time. Sees [facility] as outpatient, underwent outpatient capsule study. Has had prior EGD/colonoscopy.     8. Type 2 DM - insulin requiring  Blood sugars 113-165 in past 24 hours. Has required 2 U sliding scale aspart. Goal sugars above 90 given somnolence per nursing in the setting of admission altered mental status   -Continue hold on Lispro TID  -Lantus 9 Units am, 6 units PM      9.  History of noncompliance, priorly documented  States compliant with medications and dialysis sessions prior to admissions. Fourth admission in 6 months.   -Case  "management    10.  history of HIV on HAART  No suspicion of acute infection, or need for ID consult at this time  -Resume Abacavir, Ritonavir, Dolutegravir, Darunavir       11. History of  Bradycardia, s/p pacemaker  A paced. Sinus rhythym.  -Resume telemetry monitoring.         12.  Weakness and deconditioning  PT OT SW HHA services at home, plant to resume at discharge. Baseline mobility with walker.   -Continue renal diet   -PT/OT/SW       DVT Prophylaxis: Heparin SQ  Code Status: Full Code  Expected discharge: 2 - 3 days, recommended to prior living arrangement once stable mental status and blood pressures controlled.     Sherry Armstrong PA-C  Text Page (7am - 6pm, M-F)    Agree with above, also with acute decline and sense of nausea/dizziness this am.  BP relatively low for patient, as typically runs in the 160-170 range at home.  Recommend change clonidine to 0.1 mg every day but hold for SBP < 140.  BPs have been a challenging issue for him in the past, but given his renal failure it's quite likely that his clonidine is not clearly and leading to sx above.  Will also give slow 225 ml bolus for symptoms management.    Feeling better after IVF, no more nausea or dizziness.  EKG completed and showing NSR/Atrial paced rhythm with LVH and j point elevation, no acute ST-T changes and no significant change when compared to previous EKGs      Patient interviewed and examined independently by me three times today.       Interval History   Patient is feeling more tired today, less interactive than usual. Complains of \"gas like\" abdominal discomfort. Per nursing reports of bowel movements darker in appearance, patient notes this is not a new change for him.   No fevers, chills, chest pain, dyspnea.    -Data reviewed today: I reviewed all new labs and imaging results over the last 24 hours.     Physical Exam   Temp: 97.7  F (36.5  C) Temp src: Oral BP: 136/60 Pulse: 60 Heart Rate: 63 Resp: 18 SpO2: 90 % O2 Device: " None (Room air)    Vitals:    10/17/19 0700 10/17/19 0800 10/18/19 0530   Weight: 89.5 kg (197 lb 5 oz) 89.5 kg (197 lb 5 oz) 90.1 kg (198 lb 10.2 oz)     Vital Signs with Ranges  Temp:  [96.6  F (35.9  C)-97.7  F (36.5  C)] 97.7  F (36.5  C)  Pulse:  [60] 60  Heart Rate:  [60-63] 63  Resp:  [12-28] 18  BP: (124-162)/(63-74) 162/65  SpO2:  [90 %-92 %] 90 %  I/O last 3 completed shifts:  In: 840 [P.O.:840]  Out: 0       Constitutional: Alert, no apparent distress  Respiratory:  Normal work of breathing. Lungs clear to auscultation bilaterally, no crackles or wheezing.   Cardiovascular: Regular rate and rhythm, normal S1 and S2, slight systolic murmur appreciated.   GI: Slightly distended. Bowel sounds presents. Diffusely tender to right upper/lower quadrant with palpation. No rebound tenderness.   Skin/Integument: No rashes, no cyanosis, no peripheral edema.  Neuro: Moving all extremities with normal strength. Coordination and sensation grossly intact. Speech is slightly dysarthric. No focal deficits.   Psych: Appropriate affect.          Medications       - MEDICATION INSTRUCTIONS for Dialysis Patients -   Does not apply See Admin Instructions     abacavir  600 mg Oral QPM     amLODIPine  5 mg Oral BID     aspirin  81 mg Oral QPM     atorvastatin  40 mg Oral At Bedtime     carvedilol  25 mg Oral BID w/meals     cinacalcet  30 mg Oral Once per day on Tue Thu Sat     cloNIDine  0.1 mg Oral BID     dapsone  100 mg Oral At Bedtime     darunavir  800 mg Oral At Bedtime     dolutegravir  50 mg Oral At Bedtime     heparin (porcine)  500 Units Hemodialysis Machine Once in dialysis     heparin  5,000 Units Subcutaneous Q12H     insulin aspart  1-10 Units Subcutaneous TID AC     insulin aspart  1-7 Units Subcutaneous At Bedtime     insulin glargine  6 Units Subcutaneous At Bedtime     insulin glargine  9 Units Subcutaneous QAM AC     irbesartan  300 mg Oral At Bedtime     isosorbide mononitrate  120 mg Oral QPM     magnesium  oxide  400 mg Oral At Bedtime     multivitamin RENAL  1 capsule Oral At Bedtime     omega-3 acid ethyl esters  2 g Oral BID     pantoprazole  40 mg Oral Daily     ritonavir  100 mg Oral At Bedtime     sucroferric oxyhydroxide  500 mg Oral TID w/meals     umeclidinium  1 puff Inhalation Daily       Data   Recent Labs   Lab 10/21/19  0718 10/20/19  0542 10/18/19  0546 10/16/19  0536 10/15/19  1824 10/15/19  1343 10/15/19  0305   WBC  --  5.0 5.1 5.8  --   --  5.2   HGB  --  9.3* 9.0* 8.9*  --   --  9.7*   MCV  --  94 95 94  --   --  94   * 136* 118* 128*  --   --  120*   NA  --  130* 133 135  --   --  130*   POTASSIUM  --  4.4 4.4 3.9  --   --  4.5   CHLORIDE  --  95 95 99  --   --  95   CO2  --  28 29 26  --   --  24   BUN  --  59* 55* 65*  --   --  100*   CR  --  7.69* 8.01* 8.34*  --   --  10.90*   ANIONGAP  --  7 9 10  --   --  11   PRABHA  --  9.5 9.4 9.5  --   --  9.4   GLC  --  116* 158* 136*  --   --  279*   ALBUMIN  --   --  3.1*  --   --   --   --    PROTTOTAL  --   --  7.0  --   --   --   --    BILITOTAL  --   --  0.5  --   --   --   --    ALKPHOS  --   --  76  --   --   --   --    ALT  --   --  12  --   --   --   --    AST  --   --  15  --   --   --   --    TROPI  --   --   --   --  0.172* 0.138* 0.046*

## 2019-10-21 NOTE — PROVIDER NOTIFICATION
Provider paged: FYI: EKG has been done. Patient O2 86-88% RA, applied 2L O2    Addendum:  New orders received CXR 2 views.  Provider would like hospitalist paged when results are in to view CXR.

## 2019-10-21 NOTE — PLAN OF CARE
"A&O. BP elevated, other VSS. Tele SR with 1 AVB, demand A-Paced. LS diminished, clear. Fistula to L arm with thrill/bruit present. . Up with assist of 1 with walker. PT following.     BP (!) 150/72 (BP Location: Right arm)   Pulse 60   Temp 96.7  F (35.9  C) (Oral)   Resp 18   Ht 1.803 m (5' 11\")   Wt 90.1 kg (198 lb 10.2 oz)   SpO2 92%   BMI 27.70 kg/m     "

## 2019-10-22 NOTE — PLAN OF CARE
[id number]: Pt slept well during the night. VSS on 2L O2. A&O. Denies pain. BG 81, juice given recheck 85, juice given recheck 102. Tele reads A paced 1st AVB. Transfers with Ax1. Fistula in L arm intact. Plan for HD today. Will continue to monitor.

## 2019-10-22 NOTE — PROGRESS NOTES
Inpatient Dialysis Progress Note            Assessment and Plan:       Admitted with CP, SOB, hypertensive urgency and fever        1.  ESRD              -[facility] (four days per week)              -Dr. Dominik Chavez              -AVF              -target weight 88.5              -off 10/12/19 at 90.2 kg  2.  Anemia              -Epogen 15 K units  3.  Mineral Bone Disease              -Hectorol 2 mcg               -Sensipar 30 mg 3x per week given in center              -last PTH 09/14/19 583 pg/ml  4.  Hypertensive urgency              -BP improved with UF     Plan:    HD today for 3.5 H  Then resume 4 days per week for 2.5 H.  K baths adjusted as below.            Interval History:     He had chest pain prior to and at beginning of run.  K 6.5  EKG very abnormal with wide QRS complexes and peaked T waves.  EKG after ~20-30 minutes on HD showed much improved appearance.      I reviewed his oupt dialysis treatment records.  He actually runs 4 days per week - M-T-Th-S.  This is why he is able to run only 2.5 hrs per treatment.      He has been running on a T-Th-S schedule here in the hospital,but only for 2.5 H.  This is likely why his K went up to 6.5.  He did not run yesterday as I did not know that he ran 4 days per week.  I will run him for 3.5 H today to make sure we have plenty of potassium clearance.                Dialysis Parameters:     Wt Readings from Last 4 Encounters:   10/22/19 91.3 kg (201 lb 4.8 oz)   09/24/19 90.7 kg (199 lb 15.3 oz)   08/07/19 92.1 kg (203 lb)   07/27/19 88.6 kg (195 lb 6.4 oz)     I/O last 3 completed shifts:  In: 433 [P.O.:200; I.V.:233]  Out: -   BP Readings from Last 3 Encounters:   10/22/19 128/65   09/25/19 (!) 169/72   08/08/19 (!) 165/80       Routine, ONE TIME, Starting today For 1 Occurrences  Weight Loss (kg): 2-3  Dialysis Temp: 36.5  C  Access Device: AVF  Access Site: L arm  Dialyzer: Revaclear  Dialysis Bath: K 1 initially as K 6.5 with an abnormal  EKG, changed to K 2 when K down to 4.9  Blood Flow Rate (mL/min): 400  Total Treatment Time (hrs): 3.5  Heparin: no         Medications and Allergies:   Reviewed in EPIC      - MEDICATION INSTRUCTIONS for Dialysis Patients -   Does not apply See Admin Instructions     abacavir  600 mg Oral QPM     amLODIPine  5 mg Oral BID     aspirin  81 mg Oral QPM     atorvastatin  40 mg Oral At Bedtime     carvedilol  25 mg Oral BID w/meals     cinacalcet  30 mg Oral Once per day on Tue Thu Sat     cloNIDine  0.1 mg Oral Daily     dapsone  100 mg Oral At Bedtime     darunavir  800 mg Oral At Bedtime     dolutegravir  50 mg Oral At Bedtime     heparin (porcine)  500 Units Hemodialysis Machine Once in dialysis     heparin ANTICOAGULANT  5,000 Units Subcutaneous Q12H     insulin aspart  1-10 Units Subcutaneous TID AC     insulin aspart  1-7 Units Subcutaneous At Bedtime     insulin glargine  6 Units Subcutaneous At Bedtime     insulin glargine  9 Units Subcutaneous QAM AC     irbesartan  300 mg Oral At Bedtime     isosorbide mononitrate  120 mg Oral QPM     magnesium oxide  400 mg Oral At Bedtime     multivitamin RENAL  1 capsule Oral At Bedtime     - MEDICATION INSTRUCTIONS -   Does not apply Once     omega-3 acid ethyl esters  2 g Oral BID     pantoprazole  40 mg Oral Daily     ritonavir  100 mg Oral At Bedtime     sucroferric oxyhydroxide  500 mg Oral TID w/meals     umeclidinium  1 puff Inhalation Daily     sodium chloride 0.9%, acetaminophen, albuterol, carboxymethylcellulose PF, glucose **OR** dextrose **OR** glucagon, gabapentin, hydrALAZINE, HYDROcodone-acetaminophen, ipratropium - albuterol 0.5 mg/2.5 mg/3 mL, labetalol, lidocaine (buffered or not buffered), lidocaine (buffered or not buffered), melatonin, naloxone, ondansetron **OR** ondansetron, polyethylene glycol, - MEDICATION INSTRUCTIONS -     Allergies   Allergen Reactions     Calcium Acetate Other (See Comments)     Other reaction(s): Other, see comments  Pain in  chest and back  Pain in chest area (sensitive skin)      Contrast Dye Other (See Comments)     Contrast nephropathy     Diagnostic X-Ray Materials Other (See Comments)     PN: renal failure     Lisinopril      Sulfa Drugs               Labs:     BMP  Recent Labs   Lab 10/22/19  0910 10/22/19  0656 10/20/19  0542 10/18/19  0546 10/16/19  0536   NA  --  127* 130* 133 135   POTASSIUM 4.9 6.5* 4.4 4.4 3.9   CHLORIDE  --  92* 95 95 99   PRABHA  --  9.7 9.5 9.4 9.5   CO2  --  24 28 29 26   BUN  --  96* 59* 55* 65*   CR  --  10.70* 7.69* 8.01* 8.34*   GLC  --  104* 116* 158* 136*     CBC  Recent Labs   Lab 10/22/19  0656 10/21/19  0718 10/20/19  0542 10/18/19  0546 10/16/19  0536   WBC  --   --  5.0 5.1 5.8   HGB 9.0*  --  9.3* 9.0* 8.9*   HCT  --   --  31.9* 31.0* 29.3*   MCV  --   --  94 95 94   PLT  --  142* 136* 118* 128*     Lab Results   Component Value Date    AST 15 10/18/2019    ALT 12 10/18/2019    ALKPHOS 76 10/18/2019    BILITOTAL 0.5 10/18/2019    BILICONJ 0.0 07/06/2014            Physical Exam:   Vitals were reviewed in [facility]    Wt Readings from Last 3 Encounters:   10/22/19 91.3 kg (201 lb 4.8 oz)   09/24/19 90.7 kg (199 lb 15.3 oz)   08/07/19 92.1 kg (203 lb)       Intake/Output Summary (Last 24 hours) at 10/22/2019 1019  Last data filed at 10/22/2019 0821  Gross per 24 hour   Intake 833 ml   Output 50 ml   Net 783 ml       GENERAL APPEARANCE: pleasant, no distress, a & o after I woke him up.    HEENT:  Eyes/ears/nose grossly normal, neck supple  RESP: lungs clear to auscultation with good efforts, no crackles, rhonchi or wheezes  CV: regular rate and rhythm, normal S1 S2, no murmur, click or rub   ABDOMEN: soft, nontender, bowel sounds normal  EXTREMITIES/SKIN: no edema, no rashes or lesions     Pt seen on dialysis.  Stable run.  Good BFR.      Attestation:  I have reviewed today's vital signs, notes, medications, labs and imaging.     Rudolph Wilkes MD  [facility] - Nephrology  [phone]

## 2019-10-22 NOTE — PROGRESS NOTES
Reviewed CXR 10/21 and compared to 10/15 which showed shallow inspiration without focal infiltrate. Radiology read: Shallow inspiration. Stable heart size. Stable right-sided pacemaker. Mild interstitial prominence again seen in the mid and lower lungs probably due to the low level of inspiration. Left axillary vascular stent. Correct report. Patient comfortable at this time and discussed with nursing. Will continue to monitor. Pulse Ox 92% on 2 L NC.

## 2019-10-22 NOTE — CONSULTS
"BRIEF NUTRITION ASSESSMENT      REASON FOR ASSESSMENT:  Donald Raza is a 69 year old male seen by Registered Dietitian for [facility]    NUTRITION HISTORY:  Information obtained from the pt, EMR.   - PMH: HIV stable on antiretroviral, CAD with prior PCI, ESRD on maintenance hemodialysis, (Tuesday Thursday, Saturday schedule), with last session last Saturday as scheduled, insulin requiring diabetes mellitus type 2, TIA, hypertension, dyslipidemia, chronic anemia, prior pacemaker insertion for bradycardia, numerous hospitalizations in the past for atypical chest pain and severe uncontrolled hypertension, prior documented intermittent noncompliance and sometimes missing hemodialysis sessions  - Presents with ESRD, abdominal discomfort, bloating  - Pt said he was eating \"beautifully\" PTA with no reduced intake   - He splits the cooking with his wife and they typically eat 2-3 meals/day   - Takes Nepro supplements at home     CURRENT DIET AND INTAKE:  Diet: Dialysis Diet          Nepro with Meals - Butter Pecan   Per flowsheets, pt has been consistently eating 100% of meals this admit.   Has been drinking Nepro supps BID  Good appetite, intake     Labs: Na 127 (L), K+ (WNL), BUN 96 (H, trending up), Cr 10.7 (H, trending up), Phos 6.6 (H, trending down)     Medications: HSSI, Lantus, 6 units at bedtime and 9 units in the morning, Nephrocaps,      ANTHROPOMETRICS:  Height: 5' 11\"  Weight: 91.3 kg   Body mass index is 28.08 kg/m .   Weight Status: Overweight BMI 25-29.9  IBW:  78.2 kg   %IBW: 117%   Weight History: Weight appears stable. No recent weight loss indicated.   Wt Readings from Last 10 Encounters:   10/22/19 91.3 kg (201 lb 4.8 oz)   09/24/19 90.7 kg (199 lb 15.3 oz)   08/07/19 92.1 kg (203 lb)   07/27/19 88.6 kg (195 lb 6.4 oz)   05/14/19 88.5 kg (195 lb 1.7 oz)   05/06/19 94.3 kg (207 lb 14.4 oz)   01/29/19 89.1 kg (196 lb 6.9 oz)   01/05/19 85.5 kg (188 lb 7.9 oz)   12/13/18 90.5 kg (199 lb 8.3 oz)   09/07/18 86.9 " kg (191 lb 8 oz)     MALNUTRITION:  Patient does not meet two of the following criteria necessary for diagnosing malnutrition: significant weight loss, reduced intake, subcutaneous fat loss, muscle loss or fluid retention.     NUTRITION INTERVENTION:  Nutrition Diagnosis:  No nutrition diagnosis at this time.    Implementation:  Nutrition Education: Per Provider order if indicated    FOLLOW UP/MONITORING:   Will re-evaluate in 7 - 10 days, or sooner, if re-consulted.    Claudy West RDN, LD    Clinical Dietitian  [facility] & [facility]

## 2019-10-22 NOTE — PROGRESS NOTES
[facility]  Hospitalist Progress Note  Glenna Fernández MD 10/22/2019    Reason for Stay (Diagnosis): Hypertensive urgency         Assessment and Plan:      Summary of Stay: Donald Raza is a 69 year old male with a history of CAD with prior stenting to the LAD, preserved ejection fraction at 55 to 60% by echocardiogram in 5/2019, TIA, end-stage renal disease on hemodialysis, type 2 diabetes, HIV on therapy, hypertension/HLP, with multiple admissions for anginal type chest pain with negative work-ups, as well as uncontrolled hypertension in the setting of variable compliance with multiple medications and dialysis, admitted on 10/15/2019 with shortness of breath, diaphoresis, chest pain, and evaluation most consistent with hypertensive urgency.    He underwent dialysis with ultrafiltration as well as aggressive antihypertensive management and remains on well.  Over the past 2 days he has had a sense of dizziness and nausea episodic shortness of breath without diaphoresis and a single episode of chest pain that does not seem consistent with active coronary artery disease but possibly due to a relative hypotensive state and when his typical blood pressures are in the 160s to 170s.  He temporarily improved after gentle fluid administration on 10/21, but remains under the weather.    Over night into 10/22 developed a wide-complex QRS in the setting of evolving hyperkalemia at 6.5.  He subsequently underwent urgent dialysis and resolution of hyperkalemia and the wide-complex QRS.    He continues to feel poorly and is at less than his baseline    Problem List:   1. Hypertensive urgency: PTA regimen was carvedilol 25 mg twice daily, amlodipine 5 mg p.o. twice daily, isosorbide 120 mg p.o. daily, irbesartan 300 mg p.o. daily, and clonidine 0.1 mg p.o. daily as needed.  He was admitted kept on his carvedilol amlodipine isosorbide irbesartan and his clonidine was titrated up to 0.1 mg p.o. twice daily scheduled.  He  also underwent ultrafiltration HD and subsequent over control of blood pressures.  He reports baseline blood pressures are in the 160s to 170s but he felt symptomatic with dizziness when they were in the 130s over 60s.  Therefore have discontinued his amlodipine.  Set staggered parameters for rest of his medications favoring carvedilol and isosorbide.  Continue the clonidine but add a as needed basis.  2. General sense of feeling unwell: Etiology is not quite clear, he did have some transient chest pain that resolved spontaneously this morning while he was on dialysis and this is an one who has frequent episodes of chest pain.  His last CAD evaluation was 12/2018 with a Lexiscan that was negative for active ischemia.  He has not had a fever.  Labs for Donald actually look pretty good.  Continue to monitor closely, allow higher blood pressures in case over control of BP is the primary cause.  On recheck this evening-is looking much closer to baseline although still clearly off.  Overall - reassuring  3. CAD: Mildly elevated troponin setting of illness, no evidence of active CAD.  Continue antiplatelets, Imdur, beta-blocker as BPs tolerate  4. Mild hypoxia, no evidence of pulmonary edema, looks most consistent with poor inspiration.  Asymptomatic  5. End-stage renal disease on hemodialysis: Complicated by hyperkalemia at 6.5.  Resolved with appropriate dialysis.  Appreciate nephrology involvement, status post HD today.  Baseline he dialyzes through a left upper extremity aVF 4 times weekly: Monday/Tuesday/Thursday/Saturday  6. Wide-complex QRS in the setting of hyperkalemia: Discussed with cardiology and they wonder if he has a periodic left bundle.  They do not think that this would be the reason for his sense of feeling unwell.  7. T2 DM: PTA regimen glargine 24 units twice daily, lispro 14 units 3 times daily with meals, and 2 units for every 50/150 3 times daily.  Currently on glargine 9 units at bedtime, 6 units  "every morning, and insulin sliding scale.  Blood sugars are under excellent control likely due to poor p.o. intake  8. COPD: Non-oxygen dependent, continue home inhalers  9. HIV/hyper lipidemia: Continue antiretrovirals and statin as at home  DVT Prophylaxis: Heparin SQ  Code Status: Full Code  Functional Status: Independent at baseline  Diet: Dialysis  Christie: Not in place    Disposition Plan   Expected discharge in 2-3 ED days to transitional care unit once above sorted out.     Entered: Glenna Fernández 10/22/2019, 3:55 PM               Interval History (Subjective):      Felt very poorly this morning while on dialysis.  Complained of a pressure-like chest pain with associated shortness of breath but no nausea or diaphoresis.  Resolve spontaneously.  BPs at that time were in the 130s over 60s while getting dialysis.  States he does not get this chest pain very often although his chart would argue otherwise.    Chest pain subsequently resolved and has stayed that way.  Troponin mildly elevated the setting of end-stage renal disease which is actually quite reassuring.  No more chest pains no more nausea but continues to feel poorly in a global manner.                  Physical Exam:      Last Vital Signs:  /56 (BP Location: Right arm)   Pulse 60   Temp 96.7  F (35.9  C) (Oral)   Resp 18   Ht 1.803 m (5' 11\")   Wt 91.3 kg (201 lb 4.8 oz)   SpO2 95%   BMI 28.08 kg/m      He is pleasant, he is not back to his baseline as I know him quite well and he does seem like he is feeling poorly.  He is typically quite interactive often cracking jokes and smiling, and although he is trying to smile and interact with me he is much less animated than at baseline.  There is no diaphoresis his skin is warm and dry.  Heart is RRR, I do not detect any edema.  Abdominal exam is soft nontender nondistended lungs are clear to auscultation albeit diminished in the bases.  He is moving all extremities but globally weak           " Medications:      All current medications were reviewed with changes reflected in problem list.         Data:      All new lab and imaging data was reviewed.   Labs:  Recent Labs   Lab 10/22/19  1423  10/22/19  0656   NA  --   --  127*   POTASSIUM 4.6   < > 6.5*   CHLORIDE  --   --  92*   CO2  --   --  24   ANIONGAP  --   --  11   GLC  --   --  104*   BUN  --   --  96*   CR  --   --  10.70*   GFRESTIMATED  --   --  4*   GFRESTBLACK  --   --  5*   PRABHA  --   --  9.7    < > = values in this interval not displayed.     Recent Labs   Lab 10/22/19  0656 10/21/19  0718 10/20/19  0542   WBC  --   --  5.0   HGB 9.0*  --  9.3*   HCT  --   --  31.9*   MCV  --   --  94   PLT  --  142* 136*      Imaging:

## 2019-10-22 NOTE — PLAN OF CARE
Pt is a/o, up with A1 and walker.  Denies pain. BS was 168. Had an episode of light headed and dizziness when sitting on the edge of the bed to take his bedtime pills. Bp was 130/60 at the time. Had chest x ray which was negative. Encouraging IS. Plan to discharge in 2-3 days to TCU.

## 2019-10-22 NOTE — PROGRESS NOTES
Provider paged: Patient c/o chest pain 4/10 describes as tightness. SOB, reports feeling dizzy, pt lethargic. Patient currently at dialysis.     Addendum: STAT EKG done, troponin and potassium labs collected.  Provider to dialysis room to assess patient.

## 2019-10-22 NOTE — PROGRESS NOTES
[facility]    Hospitalist Progress Note    Assessment & Plan     Donald Raza is a 69 year old male who was admitted on 10/15/2019 for further work up and monitoring after presenting the the ED with chest discomfort, elevated blood pressures, feeling unwell with diaphoresis, and feeling bloated. He has an extensive medical history including ESRD on maintenance hemodialysis, CAD with prior PCI, HIV stable on antiretroviral therapy, insulin requiring diabetes mellitus type 2, TIA, hypertension, bradycardia s/p pacemaker insertion. He ultimately was admitted to the ICU for care and transferred to the floor 10/20/2019, after being weaned off IV nitroglycerin drip with improved blood pressure control.      --Hypoxia improving  Oxygen saturations were lower after receiving 225 ml fluid 10/21, requiring 2L NC.  Chest x-ray obtained without evidence of acute edema or pathology contributing to relatively new hypoxia. Patient is afebrile, clinically not hypervolemic. Suspect multifactorial in the setting of hyperkalemia, ESRD, chronic anemia.   -Wean O2 as tolerated    --history of CAD, Hyperlipidemia   Patient c/o of an episode of chest tightness, dizziness upon assessment prior to dialysis. EKG obtained in that time in the setting of hyperkalemia without acute changes in ST segment compared to prior. Troponin obtained, 0.163 from 0.172 on admission 10/15. Again suspect detectable troponin influenced by ESRD and relative hypotension. Levels on admission were detectable. ECHO 10/15 without wall motion abnormalities, EF 60-65%   Last ischemic evaluation 12/2018. He is on heparin.   NSTEMI in 2015 s/p stenting to LAD, repeat LAD/diagonal stenting due to in-stent restenosis (08/2016), most recent coronary angiogram in 03/018 with patent stents. Follows with Dr. Diaz of cardiology and until recently was lost to f/u. Last seen by cardiology in 08/2019 where his Plavix was stopped. Next follow up in clinic  02/2020.  -Continue PTA ASA 81 mg and Atorvastatin  -Consider Cardiology consultation, recommendations regarding role of Ischemic evaluation if symptoms recur or persist     -- Hypertension now relatively hypotensive after introduction of home oral antihypertensives, pressures 110-139/50-66. Home blood pressures 160-170's systolic.Suspect that this contributing to his occasionally positional lightheadedness.   -Resume Coreg 25mg po BID, Irbesartan 300 mg/day, Imdur 120mg/d with hold parameters, Clonidine 0.1 mg to once daily with hold parameters  -Discontinue amlodipine    --Altered mental status   Mental status improved from admission. Presumed to be multifactorial given elevated blood pressures, incident of hypoglycemia, medications, renal insufficiency. No focal deficits to support structural pathology. Head CT revealed no acute pathology to explain symptoms.   -Neurology following    -Continue hold of oral gabapentin, pain, clonazepam      --ESRD on dialysis  Hemodialysis -Tuesday Thursday Saturday with target weight 88.5 kg, left arm fistula.   -Nephrology following, dialyzed today    --Hyperkalemia  Potassium of 6.5 this morning with changes on EKG. Dialyzed, QRS normalized. Recheck of potassium 4.9.  -Recheck BMP in am  -telemetry montioring     --Chronic thrombocytopenia and anemia of chronic disease  Hemoglobin stable, baseline 8.2-9.2. No concern of acute bleed or indication for transfusion at this time. Sees [facility] as outpatient, underwent outpatient capsule study. Has had prior EGD/colonoscopy.     --Type 2 DM insulin requiring  Blood sugars  in past 24 hours, did not eat lunch yesterday. Has not required sliding scale insulin. Goal sugars above 90 given somnolence per nursing in the setting of admission altered mental status   -Continue hold on Lispro TID  -Lantus 9 Units am, 6 units PM        --History of noncompliance, priorly documented  States compliant with medications and dialysis sessions  "prior to admissions. Fourth admission in 6 months.   -Case management     --history of HIV on HAART  No suspicion of acute infection, or need for ID consult at this time  -Resume Abacavir, Ritonavir, Dolutegravir, Darunavir        --History of  Bradycardia, s/p pacemaker  A paced. Sinus rhythym.  -Resume telemetry monitoring.         --Weakness and deconditioning  PT OT SW HHA services at home, plant to resume at discharge. Baseline mobility with walker.   -Continue renal diet   -PT/OT/SW         DVT Prophylaxis: Heparin SQ  Code Status: Full Code  Expected discharge: 2 - 3 days, recommended to prior living arrangement once stable mental status and blood pressures controlled.     Sherry Armstrong PA-C  Text Page (7am - 6pm, M-F)    Interval History   Over the evening shift patient O2 stats lowered into 86-92 range requiring about 2L NC, CXR was ordered without evidence of infiltrate or acute change. Feels overall the same as yesterday, reporting feeling \"off\". He is occasionally dizzy when sitting up in bed. Some discomfort with deep inspirations.   Denies cough, fevers, chills.   Bowel Movement yesterday. Appetite improving.     Nursing notes, telemetry reviewed.    -Data reviewed today: I reviewed all new labs and imaging results over the last 24 hours. I personally reviewed serial EKG's with resolved prolonged QRS intervals. CXR from 10/21 showing shallow inspiration without acute infiltrate.     Physical Exam   Temp: 96.5  F (35.8  C) Temp src: Oral BP: 110/58   Heart Rate: 60 Resp: 18 SpO2: 92 % O2 Device: None (Room air) Oxygen Delivery: 2 LPM  Vitals:    10/17/19 0800 10/18/19 0530 10/22/19 0637   Weight: 89.5 kg (197 lb 5 oz) 90.1 kg (198 lb 10.2 oz) 91.3 kg (201 lb 4.8 oz)     Vital Signs with Ranges  Temp:  [96.5  F (35.8  C)-97.9  F (36.6  C)] 96.5  F (35.8  C)  Heart Rate:  [60-63] 60  Resp:  [18-20] 18  BP: (110-139)/(50-66) 110/58  SpO2:  [86 %-94 %] 92 %  I/O last 3 completed shifts:  In: 433 " [P.O.:200; I.V.:233]  Out: -       Constitutional: Awake, interactive, no apparent distress  Respiratory:  Normal work of breathing. Lungs clear to auscultation bilaterally, no crackles or wheezing.  Cardiovascular: Regular rate and rhythm, normal S1 and S2, and slight systolic murmur appreciated.   GI: Normal bowel sounds, soft, non-distended, non-tender.   Skin/Integument: No rashes, no cyanosis, no peripheral edema.  Neuro: Moving all extremities with normal strength. Coordination and sensation grossly intact. Speech slightly dysarthric. No focal deficits.   Psych: Appropriate affect.            Medications     - MEDICATION INSTRUCTIONS -         - MEDICATION INSTRUCTIONS for Dialysis Patients -   Does not apply See Admin Instructions     sodium chloride 0.9%  250 mL Intravenous Once in dialysis     sodium chloride 0.9%  300 mL Hemodialysis Machine Once     abacavir  600 mg Oral QPM     amLODIPine  5 mg Oral BID     aspirin  81 mg Oral QPM     atorvastatin  40 mg Oral At Bedtime     carvedilol  25 mg Oral BID w/meals     cinacalcet  30 mg Oral Once per day on Tue Thu Sat     cloNIDine  0.1 mg Oral Daily     dapsone  100 mg Oral At Bedtime     darunavir  800 mg Oral At Bedtime     dolutegravir  50 mg Oral At Bedtime     doxercalciferol  2 mcg Intravenous Once in dialysis     epoetin brittni (EPOGEN,PROCRIT) inj ESRD  15,000 Units Intravenous Once     heparin (porcine)  500 Units Hemodialysis Machine Once in dialysis     heparin ANTICOAGULANT  5,000 Units Subcutaneous Q12H     insulin aspart  1-10 Units Subcutaneous TID AC     insulin aspart  1-7 Units Subcutaneous At Bedtime     insulin glargine  6 Units Subcutaneous At Bedtime     insulin glargine  9 Units Subcutaneous QAM AC     irbesartan  300 mg Oral At Bedtime     isosorbide mononitrate  120 mg Oral QPM     magnesium oxide  400 mg Oral At Bedtime     multivitamin RENAL  1 capsule Oral At Bedtime     - MEDICATION INSTRUCTIONS -   Does not apply Once     omega-3  acid ethyl esters  2 g Oral BID     pantoprazole  40 mg Oral Daily     ritonavir  100 mg Oral At Bedtime     sucroferric oxyhydroxide  500 mg Oral TID w/meals     umeclidinium  1 puff Inhalation Daily       Data   Recent Labs   Lab 10/22/19  0656 10/21/19  0718 10/20/19  0542 10/18/19  0546 10/16/19  0536  10/15/19  1824 10/15/19  1343   WBC  --   --  5.0 5.1 5.8  --   --   --    HGB 9.0*  --  9.3* 9.0* 8.9*   < >  --   --    MCV  --   --  94 95 94  --   --   --    PLT  --  142* 136* 118* 128*   < >  --   --    *  --  130* 133 135   < >  --   --    POTASSIUM 6.5*  --  4.4 4.4 3.9   < >  --   --    CHLORIDE 92*  --  95 95 99   < >  --   --    CO2 24  --  28 29 26   < >  --   --    BUN 96*  --  59* 55* 65*   < >  --   --    CR 10.70*  --  7.69* 8.01* 8.34*   < >  --   --    ANIONGAP 11  --  7 9 10   < >  --   --    PRABHA 9.7  --  9.5 9.4 9.5   < >  --   --    *  --  116* 158* 136*   < >  --   --    ALBUMIN 3.4  --   --  3.1*  --   --   --   --    PROTTOTAL  --   --   --  7.0  --   --   --   --    BILITOTAL  --   --   --  0.5  --   --   --   --    ALKPHOS  --   --   --  76  --   --   --   --    ALT  --   --   --  12  --   --   --   --    AST  --   --   --  15  --   --   --   --    TROPI  --   --   --   --   --   --  0.172* 0.138*    < > = values in this interval not displayed.       Recent Results (from the past 24 hour(s))   X-ray Chest 2 vws*    Narrative    EXAM: XR CHEST 2 VW  LOCATION: [facility]  DATE/TIME: 10/21/2019 7:54 PM    INDICATION: Shortness of breath  COMPARISON: 10/15/2019      Impression    IMPRESSION: Shallow inspiration. Stable heart size. Stable right-sided pacemaker. Mild interstitial prominence again seen in the mid and lower lungs probably due to the low level of inspiration. Left axillary vascular stent. Correct report       Lab Results   Component Value Date    TROPONIN 0.02 03/08/2018    TROPONIN 0.02 12/25/2017    TROPONIN 0.02 04/15/2017    TROPI 0.163 ()  10/22/2019    TROPI 0.172 (HH) 10/15/2019    TROPI 0.138 () 10/15/2019

## 2019-10-22 NOTE — PROGRESS NOTES
Potassium   Date Value Ref Range Status   10/22/2019 6.5 (HH) 3.4 - 5.3 mmol/L Final     Comment:     Results confirmed by repeat test  Critical Value called to and read back by  ESTHELA GIMENEZ (MS3 RN) AT 0755 10.22.19, TD       Hemoglobin   Date Value Ref Range Status   10/22/2019 9.0 (L) 13.3 - 17.7 g/dL Final     Creatinine   Date Value Ref Range Status   10/22/2019 10.70 (H) 0.66 - 1.25 mg/dL Final     Urea Nitrogen   Date Value Ref Range Status   10/22/2019 96 (H) 7 - 30 mg/dL Final     Sodium   Date Value Ref Range Status   10/22/2019 127 (L) 133 - 144 mmol/L Final     INR   Date Value Ref Range Status   05/13/2019 1.10 0.86 - 1.14 Final       DIALYSIS PROCEDURE NOTE  Hepatitis status of previous patient on machine log was checked and verified ok to use with this patients hepatitis status.  Patient dialyzed for 2.5 hrs. on a 1 K and then 2K bath with a net fluid removal of  2.5  L.  A BFR of 400  ml/min was obtained via a LAVF using 15 gauge needles.    The patient was seen by Dr. Wilkes during the treatment.  Total heparin received during the treatment: 0 units.   Needle cannulation sites held x 15 min, pressure dressings applied after hemostasis.    Meds  Given: epogen; hectorol Complications: clotted dialyzer; approximately 200 mL ESBL     Potassium education provided verbally to the patient and the patient verbalized understanding.     ICEBOAT? Timeout performed pre-treatment  I: Patient was identified using 2 identifiers  C: Consent obtained/verified current before treatment  E: Equipment preventative maintenance is current and dialysis delivery system OK to use  B: Hepatitis B Surface Antigen: Negative; Draw Date: 1/29/19      Hepatitis B Surface Antibody: Immune; Draw Date: 1/29/2019  O: Dialysis orders present and complete prior to treatment  A: Vascular access verified and assessed prior to treatment  T: Treatment was performed at a clinically appropriate time  ?: Patient was allowed to ask questions  and address concerns prior to treatment  See flowsheet in EPIC for further details and post assessment.  Machine water alarm in place and functioning. Transducer pods intact and checked every 15min.  Pt returned via wheelchair.  Report received from: ROBERT Becerra RN  Report given to: ROBERT Becerra RN  Chlorine/Chloramine water system checked every 4 hours.  Outpatient Dialysis at [facility], M/T/Th/Sat.     Signed,   Amariah Schoener, RN

## 2019-10-22 NOTE — PROGRESS NOTES
Patient alert and oriented during initial assessment, patient to dialysis c/o chest pain, dizziness and becoming more lethargic, provider aware see previous notes.  Lung sounds dim/clear.   90-92 O2 via 2L NC.  Dialysis diet, blood sugar 98 patient ate 75% of meal at breakfast.  Fistula left arm wnl.  Will continue to monitor.      Addendum: Tele: A-paced with peaked T's

## 2019-10-22 NOTE — PROVIDER NOTIFICATION
"Provider paged: Tele tech called to notify: \"patient is having wide QRS complex as he had before\"    Addendum: Additionally patient asymptomatic with tele report of widening QRS complex.    Addendum: New orders for magnesium and potassium STAT.  Results magnesium 2.3, potassium 4.6  "

## 2019-10-22 NOTE — PROGRESS NOTES
D:  Per record review, pt's discharge recommendation is TCU per PT note on 10/19.    I:  Sw received a call from pt's [facility] CM, Karen Lam [phone], who said that the pt receives 9 hours of PCA services/day.  Pt's dtr is his PCA.  Pt also has a lot of support from his wife and brother.  Pt has assistive equipment in his home including a lifeline.  Pt has HC services through [facility] RN/PT/OT/ALBERTINA.  Recently, HC had additional grab bars installed and a safety barrier installed on his toilet.    I:  Albertina will continue with discharge planning and will be available as needed until discharge.  Albertina will continue to follow-up with Karen as needed.    BRIEN Johnson, LGSW  [phone]  [facility]

## 2019-10-23 NOTE — PLAN OF CARE
-VSS ex HTN (153/65)  -Disoriented to time  -Alert cooperative  -fistula WDL  -2L O2 satting at 92%  -Not lethargic, just up in chair watching tv- alert  -numbness in extremities   -Tele is sinus rhythm with atrial pacing  -Bumped O2 up to 3 L as he was dipping at night   -Was at 88 at 3 L, patient increased to 4 L O2  -Informed next shift to report on patient's sleep, as lack of sleep may be causing his lethargy on days

## 2019-10-23 NOTE — PROGRESS NOTES
[facility]  Hospitalist Progress Note  Glenna Fernández MD 10/23/2019    Reason for Stay (Diagnosis): Hypertensive urgency         Assessment and Plan:      Summary of Stay: Donald Raza is a 69 year old male with a history of CAD with prior stenting to the LAD, preserved ejection fraction at 55 to 60% by echocardiogram in 5/2019, TIA, end-stage renal disease on hemodialysis, type 2 diabetes, HIV on therapy, hypertension/HLP, with multiple admissions for anginal type chest pain with negative work-ups, as well as uncontrolled hypertension in the setting of variable compliance with multiple medications and dialysis, admitted on 10/15/2019 with shortness of breath, diaphoresis, chest pain, and evaluation most consistent with hypertensive urgency.    He underwent dialysis with ultrafiltration as well as aggressive antihypertensive management and remains unwell.  Over the past 3 days he has had episodic sense of dizziness/nausea/shortness of breath without diaphoresis and a single episode of chest pain that does not seem consistent with active coronary artery disease but possibly due to a relative hypotensive state and when his typical blood pressures are in the 160s to 170s.  He temporarily improved after gentle fluid administration on 10/21, but remains below baseline    Over night into 10/22 developed a wide-complex QRS in the setting of evolving hyperkalemia at 6.5.  He subsequently underwent urgent dialysis and resolution of hyperkalemia and the wide-complex QRS.    He continues to have waxing and waning level of alertness due to an elusive etiology.  No significant metabolic/infectious/toxic agent identified.      Problem List:   1. Hypertensive urgency: PTA regimen was carvedilol 25 mg twice daily, amlodipine 5 mg p.o. twice daily, isosorbide 120 mg p.o. daily, irbesartan 300 mg p.o. daily, and clonidine 0.1 mg p.o. daily as needed.  He was admitted kept on his carvedilol amlodipine isosorbide irbesartan  and his clonidine was titrated up to 0.1 mg p.o. twice daily scheduled.  He also underwent ultrafiltration HD and subsequent over control of blood pressures.  He reports baseline blood pressures are in the 160s to 170s but he felt symptomatic with dizziness when they were in the 130s over 60s.  Therefore have discontinued his amlodipine.  Set staggered parameters for rest of his medications favoring carvedilol and isosorbide.  Still with variable BPs so decreased carvedilol to 12.5 mg bid with parameters. Continued the clonidine but only on an as needed basis.  2. General sense of feeling unwell/waxing and waning mental status: Etiology is not quite clear, he did have some transient chest pain that resolved spontaneously 10/22/19 on dialysis and this is an one who has frequent episodes of chest pain.  His last CAD evaluation was 12/2018 with a Lexiscan that was negative for active ischemia.  He has not had a fever.  Labs for Donald actually look pretty good.  Continue to monitor closely, allow higher blood pressures in case over control of BP is the primary cause.  No toxic/infectious/metabolic cause identified.   3. Intermittent lethargy:  ? Due to poor sleeping, monitor closely and document, discussed with RN    4. CAD: Mildly elevated troponin setting of illness and ESRD, no evidence of active CAD. Echo on admission shows EF 60-65 %, slight progression of Ao stenosis and estimated as mild to moderate, mod cLVH. Continue antiplatelets, Imdur, beta-blocker as BPs tolerate  5. Mild hypoxia, no evidence of pulmonary edema, looks most consistent with poor inspiration.  Asymptomatic  6. End-stage renal disease on hemodialysis: Complicated by hyperkalemia at 6.5.  Resolved with appropriate dialysis.  Appreciate nephrology involvement, status post HD.  Baseline he dialyzes through a left upper extremity AVF 4 times weekly: Monday/Tuesday/Thursday/Saturday  7. Wide-complex QRS in the setting of hyperkalemia: Discussed with  "cardiology and they wonder if he has a periodic left bundle.  They do not think that this would be the reason for his sense of feeling unwell.  8. T2 DM: PTA regimen glargine 24 units twice daily, lispro 14 units 3 times daily with meals, and 2 units for every 50/150 3 times daily.  Currently on glargine 9 units at bedtime, 6 units every morning, and insulin sliding scale.  Blood sugars are under excellent control likely due to poor p.o. intake  9. COPD: Non-oxygen dependent, continue home inhalers  10. HIV/hyper lipidemia: Continue antiretrovirals and statin as at home  DVT Prophylaxis: Heparin SQ  Code Status: Full Code  Functional Status: Independent at baseline  Diet: Dialysis  Christie: Not in place    Disposition Plan   Expected discharge in tbd ED days to transitional care unit once above sorted out.     Entered: Glenna Fernández 10/23/2019, 12:44 PM         Interval History (Subjective):      Sleeping this am and difficult to arouse, when awake mumbles answers to questions then quickly falls back asleep.  Denies cp or sob, but history limited    On recheck fully alert and does not recall our visit this morning.  Reports poor sleeping, ? If that is what is causing such am lethargy.  Discussed with RN pass on to future RNs to monitor and document                  Physical Exam:      Last Vital Signs:  BP (!) 145/68 (BP Location: Right arm)   Pulse 60   Temp 97.5  F (36.4  C) (Axillary)   Resp 18   Ht 1.803 m (5' 11\")   Wt 89.4 kg (197 lb)   SpO2 94%   BMI 27.48 kg/m      Much more somnolent this am, arouses to voice but quickly drifts back off to sleep.  He is typically quite interactive often cracking jokes and smiling, and although he is trying to smile and interact with me he is much less animated than at baseline.  There is no diaphoresis his skin is warm and dry.  Heart is RRR, I do not detect any edema.  Abdominal exam is soft nontender nondistended lungs are clear to auscultation albeit diminished in " the bases.  He is moving all extremities but globally weak           Medications:      All current medications were reviewed with changes reflected in problem list.         Data:      All new lab and imaging data was reviewed.   Labs:  Recent Labs   Lab 10/23/19  0745   *   POTASSIUM 4.5   CHLORIDE 90*   CO2 29   ANIONGAP 8   GLC 88   BUN 70*   CR 8.55*   GFRESTIMATED 6*   GFRESTBLACK 7*   PRABHA 9.6     Recent Labs   Lab 10/23/19  1019   WBC 5.1   HGB 8.5*   HCT 28.5*   MCV 93   *      Imaging:

## 2019-10-23 NOTE — PROGRESS NOTES
[facility]    Nephrology Progress Note     Assessment & Plan     Admitted with CP, SOB, hypertensive urgency and fever        1.  ESRD              -[facility] (four days per week)              -Dr. Dominik Chavez              -AVF              -target weight 88.5              -off 10/12/19 at 90.2 kg, but getting to less than that now.    2.  Anemia              -Epogen 15 K units  3.  Mineral Bone Disease              -Hectorol 2 mcg               -Sensipar 30 mg 3x per week given in center              -last PTH 09/14/19 583 pg/ml  4.  Hypertensive urgency              -BP improved with UF  5.  Hyperkalemia - better  6.  Altered Mental Status - Cause unclear.  I will send an ammonia level though I think it will not be elevated.       Plan:     HD tomorrow  Check ammonia level.    Rudolph Wilkes MD  [facility] - Nephrology  [phone]    Interval History     Waxing and waning level of alertness today and last night.  Cause unclear.  He awakens for me this AM and says he feels fine.  Labs not revealing.  VBG OK.    No meds to implicate.      Physical Exam   Temp: 95.7  F (35.4  C) Temp src: Oral BP: (!) 149/69   Heart Rate: 60 Resp: 19 SpO2: 93 % O2 Device: Nasal cannula Oxygen Delivery: 2 LPM  Vitals:    10/18/19 0530 10/22/19 0637 10/23/19 0700   Weight: 90.1 kg (198 lb 10.2 oz) 91.3 kg (201 lb 4.8 oz) 89.4 kg (197 lb)     Vital Signs with Ranges  Temp:  [95.7  F (35.4  C)-96.8  F (36  C)] 95.7  F (35.4  C)  Heart Rate:  [58-63] 60  Resp:  [18-20] 19  BP: (127-153)/(53-69) 149/69  SpO2:  [89 %-95 %] 93 %  I/O last 3 completed shifts:  In: 1840 [P.O.:1840]  Out: 2550 [Urine:50; Other:2500]    GENERAL APPEARANCE: pleasant, NAD, sleepy, but awakens to normal level of alertness  HEENT:  Eyes/ears/nose/neck grossly normal  RESP: lungs cta b c good efforts, no crackles, rhonchi or wheezes  CV: RRR, nl S1/S2, no m/r/g   ABDOMEN: o/s/nt/nd, bs present  EXTREMITIES/SKIN: no  rashes/lesions; no edema    Medications       - MEDICATION INSTRUCTIONS for Dialysis Patients -   Does not apply See Admin Instructions     abacavir  600 mg Oral QPM     aspirin  81 mg Oral QPM     atorvastatin  40 mg Oral At Bedtime     carvedilol  25 mg Oral BID w/meals     cinacalcet  30 mg Oral Once per day on Tue Thu Sat     dapsone  100 mg Oral At Bedtime     darunavir  800 mg Oral At Bedtime     dolutegravir  50 mg Oral At Bedtime     heparin ANTICOAGULANT  5,000 Units Subcutaneous Q12H     insulin aspart  1-10 Units Subcutaneous TID AC     insulin aspart  1-7 Units Subcutaneous At Bedtime     insulin glargine  6 Units Subcutaneous At Bedtime     insulin glargine  9 Units Subcutaneous QAM AC     irbesartan  300 mg Oral At Bedtime     isosorbide mononitrate  120 mg Oral QPM     magnesium oxide  400 mg Oral At Bedtime     multivitamin RENAL  1 capsule Oral At Bedtime     omega-3 acid ethyl esters  2 g Oral BID     pantoprazole  40 mg Oral Daily     ritonavir  100 mg Oral At Bedtime     sucroferric oxyhydroxide  500 mg Oral TID w/meals     umeclidinium  1 puff Inhalation Daily       Data   BMP  Recent Labs   Lab 10/23/19  0745 10/22/19  1423 10/22/19  0910 10/22/19  0656 10/20/19  0542 10/18/19  0546   *  --   --  127* 130* 133   POTASSIUM 4.5 4.6 4.9 6.5* 4.4 4.4   CHLORIDE 90*  --   --  92* 95 95   PRABHA 9.6  --   --  9.7 9.5 9.4   CO2 29  --   --  24 28 29   BUN 70*  --   --  96* 59* 55*   CR 8.55*  --   --  10.70* 7.69* 8.01*   GLC 88  --   --  104* 116* 158*     [email or web address](phos:4)  CBC)  Recent Labs   Lab 10/23/19  1019 10/22/19  0656 10/21/19  0718 10/20/19  0542 10/18/19  0546   WBC 5.1  --   --  5.0 5.1   HGB 8.5* 9.0*  --  9.3* 9.0*   HCT 28.5*  --   --  31.9* 31.0*   MCV 93  --   --  94 95   *  --  142* 136* 118*     Recent Labs   Lab 10/18/19  0546   AST 15   ALT 12   ALKPHOS 76   BILITOTAL 0.5     No lab results found in last 7 days.  No results found for: D2VIT, D3VIT, DTOT  Recent  Labs   Lab 10/23/19  1019   HGB 8.5*   HCT 28.5*   MCV 93     No results for input(s): PTHI in the last 168 hours.    Attestation:   I have reviewed today's relevant vital signs, notes, medications, labs and imaging.

## 2019-10-23 NOTE — PLAN OF CARE
[id number]: Pt slept on and off during the night. VSS on 2L. A&O. , 106. Tele reads A paced. Fistula L arm. Transfers with Ax1. Md notified for something for itching no new orders obtained. Will continue to monitor.

## 2019-10-23 NOTE — PLAN OF CARE
Vss. Pt very lethargic this am. Arousable to voice somewhat confused. Labs were wnl. For pt. Meds given late as pt was too lethargic to take. Poor appetite. Did have a muffin and protien shake for lunch. Up to chair with therapy and ambulated in room at lunch. Bg 94 pre meal. Daughter updated. Tele apaced. Sats 94-95% on 2LNC. Desats to mid 80's when oxygen off. Continuous pulse oximeter on . Repositions self in bed  Alarms on

## 2019-10-23 NOTE — PROVIDER NOTIFICATION
Pt lethargic this am. bg 87. Pt did wake up to drink juice. Recheck bg 97. Pt slightly more awake.  Breakfast ordered. Pulse oximeter on sats 94% on 2LNC. Pt was frequently taking his nc off during the night. Dr. Fernández notified and will come see pt. Pt is stable   ABC intact.

## 2019-10-23 NOTE — PLAN OF CARE
Discharge Planner PT   Patient plan for discharge: none stated  Current status: Pt lethargic but woke with activity, doing more today than last several days. Bed mob with min A and cues, sit to stand with min A x 2 and FWW, amb with FWW with min A x 35', carmen LE ex in chair.  Up in chair for lunch.  Pt should be up with nursing to amb as able and to chair for all meals, amb to BR  Barriers to return to prior living situation: level of A for mob, limited act carmen, decreased strength and balance with increased risk of falls, not carmen functional gait distances, stairs  Recommendations for discharge: TCU  Rationale for recommendations: Pt is not currently at baseline for mobility, and is unsafe to discharge home. With continued PT, both IP and after discharge, pt is likely to obtain mobility goals.       Entered by: HARSH ORELLANA 10/23/2019 2:16 PM

## 2019-10-24 NOTE — PROGRESS NOTES
Inpatient Dialysis Progress Note            Assessment and Plan:     Admitted with CP, SOB, hypertensive urgency and fever     1.  ESRD              -[facility] (four days per week)              -Dr. Dominik Chavez              -AVF              -target weight 88.5 - now down to 87 kg  2.  Anemia              -Epogen 15 K units  3.  Mineral Bone Disease              -Hectorol 2 mcg               -Sensipar 30 mg 3x per week given in center              -last PTH 09/14/19 583 pg/ml  4.  Hypertensive urgency              -BP improved with UF  5.  Hyperkalemia - better  6.  Altered Mental Status - Better     Plan:     Home soon ?               Interval History:     Feeling fine.    Eating ok.  Denies n/v/f/c.    No dizziness/lightheadedness/cramping.    No abd pain/cp/sob.        Dialysis Parameters:     Wt Readings from Last 4 Encounters:   10/24/19 90.2 kg (198 lb 13.7 oz)   09/24/19 90.7 kg (199 lb 15.3 oz)   08/07/19 92.1 kg (203 lb)   07/27/19 88.6 kg (195 lb 6.4 oz)     I/O last 3 completed shifts:  In: 438 [P.O.:438]  Out: -   BP Readings from Last 3 Encounters:   10/24/19 125/70   09/25/19 (!) 169/72   08/08/19 (!) 165/80       Routine, ONE TIME, Starting today For 1 Occurrences  Weight Loss (kg): 3  Dialysis Temp: 36.5  C  Access Device: AVF  Access Site: L arm  Dialyzer: Revaclear  Dialysis Bath: K 2  Blood Flow Rate (mL/min): 400  Total Treatment Time (hrs): 2.5  Heparin: low dose         Medications and Allergies:   Reviewed in EPIC      - MEDICATION INSTRUCTIONS for Dialysis Patients -   Does not apply See Admin Instructions     abacavir  600 mg Oral QPM     aspirin  81 mg Oral QPM     atorvastatin  40 mg Oral At Bedtime     carvedilol  12.5 mg Oral BID w/meals     cinacalcet  30 mg Oral Once per day on Tue Thu Sat     dapsone  100 mg Oral At Bedtime     darunavir  800 mg Oral At Bedtime     dolutegravir  50 mg Oral At Bedtime     heparin ANTICOAGULANT  5,000 Units Subcutaneous Q12H     insulin  aspart  1-10 Units Subcutaneous TID AC     insulin aspart  1-7 Units Subcutaneous At Bedtime     insulin glargine  6 Units Subcutaneous At Bedtime     insulin glargine  9 Units Subcutaneous QAM AC     irbesartan  300 mg Oral At Bedtime     isosorbide mononitrate  120 mg Oral QPM     magnesium oxide  400 mg Oral At Bedtime     multivitamin RENAL  1 capsule Oral At Bedtime     omega-3 acid ethyl esters  2 g Oral BID     pantoprazole  40 mg Oral Daily     ritonavir  100 mg Oral At Bedtime     sucroferric oxyhydroxide  500 mg Oral TID w/meals     umeclidinium  1 puff Inhalation Daily     sodium chloride 0.9%, acetaminophen, albuterol, carboxymethylcellulose PF, cloNIDine, glucose **OR** dextrose **OR** glucagon, gabapentin, hydrALAZINE, HYDROcodone-acetaminophen, ipratropium - albuterol 0.5 mg/2.5 mg/3 mL, lidocaine (buffered or not buffered), melatonin, naloxone, ondansetron **OR** ondansetron, polyethylene glycol, - MEDICATION INSTRUCTIONS -     Allergies   Allergen Reactions     Calcium Acetate Other (See Comments)     Other reaction(s): Other, see comments  Pain in chest and back  Pain in chest area (sensitive skin)      Contrast Dye Other (See Comments)     Contrast nephropathy     Diagnostic X-Ray Materials Other (See Comments)     PN: renal failure     Lisinopril      Sulfa Drugs               Labs:     BMP  Recent Labs   Lab 10/24/19  0825 10/23/19  0745 10/22/19  1423 10/22/19  0910 10/22/19  0656 10/20/19  0542   * 127*  --   --  127* 130*   POTASSIUM 5.1 4.5 4.6 4.9 6.5* 4.4   CHLORIDE 89* 90*  --   --  92* 95   PRABHA 9.6 9.6  --   --  9.7 9.5   CO2 27 29  --   --  24 28   BUN 92* 70*  --   --  96* 59*   CR 10.30* 8.55*  --   --  10.70* 7.69*   * 88  --   --  104* 116*     CBC  Recent Labs   Lab 10/24/19  0825 10/23/19  1019 10/22/19  0656 10/21/19  0718 10/20/19  0542 10/18/19  0546   WBC  --  5.1  --   --  5.0 5.1   HGB 8.2* 8.5* 9.0*  --  9.3* 9.0*   HCT  --  28.5*  --   --  31.9* 31.0*   MCV   --  93  --   --  94 95    134*  --  142* 136* 118*     Lab Results   Component Value Date    AST 15 10/18/2019    ALT 12 10/18/2019    ALKPHOS 76 10/18/2019    BILITOTAL 0.5 10/18/2019    BILICONJ 0.0 07/06/2014    ISI 28 10/23/2019            Physical Exam:   Vitals were reviewed in [facility]    Wt Readings from Last 3 Encounters:   10/24/19 90.2 kg (198 lb 13.7 oz)   09/24/19 90.7 kg (199 lb 15.3 oz)   08/07/19 92.1 kg (203 lb)       Intake/Output Summary (Last 24 hours) at 10/24/2019 1106  Last data filed at 10/23/2019 1437  Gross per 24 hour   Intake 200 ml   Output --   Net 200 ml       GENERAL APPEARANCE: pleasant, no distress, a & o  HEENT:  Eyes/ears/nose grossly normal, neck supple  RESP: lungs clear to auscultation with good efforts, no crackles, rhonchi or wheezes  CV: regular rate and rhythm, normal S1 S2, systole M  ABDOMEN: soft, nontender, bowel sounds normal  EXTREMITIES/SKIN: no edema, no rashes or lesions     Pt seen on dialysis.  Stable run.  Good BFR.      Attestation:  I have reviewed today's vital signs, notes, medications, labs and imaging.     Rudolph Wilkes MD  [facility] - Nephrology  [phone]

## 2019-10-24 NOTE — PROGRESS NOTES
D:  Per record review, pt's discharge recommendation is TCU.      I:  Sw received a vm from pt's ARE CMKaren [phone], requesting an update on the pt's discharge status.  Karen said that she will be out of the office until Tuesday.   Sw called Karen back and left a vm updating her the pt's discharge recommendation is TCU, but the discharge date is unknown at this time.      A/P:  Sw will continue with discharge planning and will be available as needed until discharge.    BRIEN Johnson, LGSW  [phone]  [facility]

## 2019-10-24 NOTE — PROGRESS NOTES
Potassium   Date Value Ref Range Status   10/24/2019 5.1 3.4 - 5.3 mmol/L Final     Hemoglobin   Date Value Ref Range Status   10/24/2019 8.2 (L) 13.3 - 17.7 g/dL Final     Creatinine   Date Value Ref Range Status   10/24/2019 10.30 (H) 0.66 - 1.25 mg/dL Final     Urea Nitrogen   Date Value Ref Range Status   10/24/2019 92 (H) 7 - 30 mg/dL Final     Sodium   Date Value Ref Range Status   10/24/2019 127 (L) 133 - 144 mmol/L Final     INR   Date Value Ref Range Status   05/13/2019 1.10 0.86 - 1.14 Final       DIALYSIS PROCEDURE NOTE  Hepatitis status of previous patient on machine log was checked and verified ok to use with this patients hepatitis status.  Patient dialyzed for 2.5 hrs. on a 2 K bath with a net fluid removal of  3L.  A BFR of 400 ml/min was obtained via a Left Fistula using 15 gauge needles.    The patient was seen by Dr. Wilkes during the treatment.  Total heparin received during the treatment: 1.4 units.   Needle cannulation sites held x 20 min, pressure dressings applied after hemostasis.    Meds  Given:EPO & Hectorol  Complications: none.  Procedure education provided orally to patient. Patient verbalized understanding.     ICEBOAT? Timeout performed pre-treatment  I: Patient was identified using 2 identifiers  C: Consent obtained/verified current before treatment  E: Equipment preventative maintenance is current and dialysis delivery system OK to use  B: Hepatitis B Surface Antigen: Nonreactive; Draw Date: 1/29/19      Hepatitis B Surface Antibody: Immune; Draw Date: 1/29/19  O: Dialysis orders present and complete prior to treatment  A: Vascular access verified and assessed prior to treatment  T: Treatment was performed at a clinically appropriate time  ?: Patient was allowed to ask questions and address concerns prior to treatment  See flowsheet in EPIC for further details and post assessment.  Machine water alarm in place and functioning. Transducer pods intact and checked every 15min.  Pt  returned via wheelchair  Report received from: ROBERT Mustafa RN  Report given to: ROBERT Mustafa RN  Chlorine/Chloramine water system checked every 4 hours.  Outpatient Dialysis at [facility], M/T/Th/Sat.

## 2019-10-24 NOTE — PLAN OF CARE
Presentation/Diagnosis: HTN emergency  History: HIV, CKD, hepatitis, HTN, DM  Dialysis T, Th, & Sat  Labs/Protocols: Sodium 127, Cr 8.55  Vitals: Last /49  Cardiac: apical pulse irregular, O2 4L   Telemetry: A paced with 1st degree block  Respiratory: diminished BS  Neuro: A&O x 4  GI/: pt on hemodialysis  Skin: A few bruises throughout  LDAs: [place] on [place], PIV LW  Diet: dialysis  Activity: assist x 1 walker  Teaching: educated on plan of care  Plan: dialysis today  Patient slept about half the shift

## 2019-10-24 NOTE — PLAN OF CARE
Pt A/O x 4, but can be forgetful. VSS, pt denies pain, headache, dizziness, n/v & SOB. Lung sounds diminished, fine crackles, on 3L O2. Pt up Asst: 1 w/walker and gait belt. Tele: A-paced w/1st degree block. Pt has dialysis (T,Thu, Sat), fistula in left arm. Blood glucose levels: 103, 92. Pt lethargy has improved from yesterday. Will continue with plan of care.

## 2019-10-25 NOTE — PLAN OF CARE
"[place]: A&O  VS: BP (!) 145/67 (BP Location: Right arm)   Pulse 60   Temp 97.1  F (36.2  C) (Oral)   Resp 18   Ht 1.803 m (5' 11\")   Wt 90.2 kg (198 lb 13.7 oz)   SpO2 95%   BMI 27.73 kg/m     Tele: SR, occ Apaced  LS: dim on RA, denies SOB  GI: active, denies nausea  : pt is on hemodialysis  Skin: intact  Activity: x1, GB, walker   Diet: dialysis diet   Pain: denies  Lab: , 152, 213  Plan: discharge tmrrw after dialysis    *Pt hypertensive at 1759 179/69, scheduled coreg given, at 1851 170/71. Per sukhwinder, to recheck in half an hour-1 hr before giving PRNs.           "

## 2019-10-25 NOTE — PLAN OF CARE
Discharge Planner PT   Patient plan for discharge: none stated  Current status: Patient in bedside chair upon arrival, on RA during session.  Patient is independent with bed mobility.  Patient performed repeated sit to stand transfers with 2WW and graded assist from CGA to SBA.  Patient amb 175 feet with 2WW and graded assist from CGA to SBA.  Patient with increased trunk flexion, slow gait speed, increased fatigue.  Patient reported no SOB.  Patient with no loss of balance with mobility.  Recommended amb with nursing 3x/day with 2WW.  Barriers to return to prior living situation: alone at times during the day, decreased strength, decreased activity tolerance  Recommendations for discharge: Home with resumption of Home RN/PT/OT/HHA  Rationale for recommendations: Patient has demonstrated progress with mobility, decreased lethargy and improved activity tolerance.  Patient would benefit from ongoing home physical therapy in order to increase strength, continue to increase activity tolerance and independence with mobility.  Patient requires home PT at this time as attending PT in a clinic setting would be a considerable and significantly taxing effort, limiting his ability to participate in therapy session.           Entered by: Keren Goodman 10/25/2019 11:26 AM

## 2019-10-25 NOTE — PLAN OF CARE
Presentation/Diagnosis: HTN emergency  History: HIV, CKD, hepatitis, HTN, DM  Dialysis T, Th, & Sat  Labs/Protocols: Sodium 127, Cr 10.30  Vitals: Last /64  Cardiac: apical pulse irregular  Telemetry: A paced on demand with prolonged QT  Respiratory: diminished BS  Neuro: A&O x 4  GI/: pt on hemodialysis  Skin: A few bruises throughout  LDAs: [place] on [place], PIV LW  Diet: dialysis  Activity: assist x 1 walker  Teaching: educated on plan of care  Plan: possible discharge in the morning  Patient slept about half the shift

## 2019-10-25 NOTE — PROGRESS NOTES
[facility]  Hospitalist Progress Note  Patient Name: Donald Raza    MRN: [id number]  Provider: Jovi Hayes MD  10/25/19    Initial presenting complaint/issue to hospital (Diagnosis): shortness of breath, diaphoresis, hypertension         Assessment and Plan:      Summary of Stay: Donald Raza is a 69 year old male with history of CAD with prior stenting to the LAD, preserved ejection fraction of 55 to 60% by echocardiogram in 5/2019, TIA, end-stage renal disease on hemodialysis, type 2 diabetes, HIV for which he is on anti-retroviral therapy, hypertension, HLP, multiple admissions for anginal type chest pain with negative work-ups, and history of uncontrolled hypertension in the setting of variable compliance with multiple medications and dialysis.  He presented to the ED on 10/15/2019 with shortness of breath, diaphoresis, chest pain, and elevated blood pressure.  Work-up showed mild troponin elevation.  Echocardiogram showed no wall motion abnormality but did show some progression of aortic stenosis.  Overall, his presentation was thought to be due to pretense of urgency. He underwent dialysis with ultrafiltration as well as aggressive antihypertensive management and remained unwell.    He had symptoms of dizziness, nausea, and shortness of breath without diaphoresis. He had a single episode of chest pain. This did not seem consistent with acute coronary artery disease but possibly due to a relative hypotensive state. He improved after gentle fluid administration but showed symptoms of altered mental status. Over night into 10/22 Donald developed a wide-complex QRS in the setting of evolving hyperkalemia at 6.5.  He underwent urgent dialysis and resolution of hyperkalemia and the wide-complex QRS. He continued to have waxing and waning level of alertness but ultimately seemed to improve on 10/24/19. His strength improved.      Problem List:   1. Hypertensive urgency: PTA regimen was carvedilol  25 mg twice daily, amlodipine 5 mg p.o. twice daily, isosorbide 120 mg p.o. daily, irbesartan 300 mg p.o. daily, and clonidine 0.1 mg p.o. daily as needed.  He was admitted and kept on his carvedilol, amlodipine, isosorbide, and irbesartan. His clonidine was titrated up to 0.1 mg p.o. twice daily scheduled.  He also underwent ultrafiltration HD and subsequent over control of blood pressures.  He reports baseline blood pressures are in the 160s to 170s and that he feels symptomatic with dizziness when blood pressures are in the 130s over 60s.  Ultimately, Amlodipine was discontinued. We have set staggered parameters for rest of his medications favoring carvedilol and isosorbide. He still had variable BPs so carvedilol was decreased to 12.5 mg bid with parameters. We have continued the clonidine but only on an as needed basis.  2. General sense of feeling unwell/waxing and waning mental status: Etiology is not quite clear, he did have some transient chest pain that resolved spontaneously 10/22/19 on dialysis and this is an one who has frequent episodes of chest pain.  His last CAD evaluation was 12/2018 with a Lexiscan that was negative for active ischemia.  He has not had a fever.  Labs for Donald actually look pretty good.  Continue to monitor closely, allow higher blood pressures in case over control of BP is the primary cause.  No toxic/infectious/metabolic cause identified. He seems to have improved over the last two days.   3. Intermittent lethargy:  Consider metabolic encephalopathy due to hypertensive urgency or poor sleep. Seems improved over the last two days.  4. CAD: Mildly elevated troponin setting of illness and ESRD, no evidence of active CAD. Echo on admission shows EF 60-65 %, slight progression of Ao stenosis and estimated as mild to moderate, mod cLVH. Continue antiplatelets, Imdur, beta-blocker as BPs tolerate  5. Mild hypoxia, no evidence of pulmonary edema, looks most consistent with poor  "inspiration.  Asymptomatic, resolved.   6. End-stage renal disease on hemodialysis: Complicated by hyperkalemia at 6.5.  Resolved with appropriate dialysis.  Appreciate nephrology involvement, status post HD.  Baseline he dialyzes through a left upper extremity AVF 4 times weekly: Monday/Tuesday/Thursday/Saturday. Dialyzed tomorrow then home.   7. Wide-complex QRS in the setting of hyperkalemia: Discussed with cardiology by my colleague and they wonder if he has a periodic left bundle.  They do not think that this would be the reason for his sense of feeling unwell.  8. T2 DM: PTA regimen glargine 24 units twice daily, lispro 14 units 3 times daily with meals, and 2 units for every 50/150 3 times daily.  Currently on glargine 9 units at bedtime, 6 units every morning, and insulin sliding scale.  Blood sugars are under excellent control likely due to poor p.o. intake  9. COPD: Non-oxygen dependent, continue home inhalers  10. HIV/hyper lipidemia: Continue antiretrovirals and statin as at home  11. Deconditioning. PT recommended TCU on discharge but reassessed and thinks home with PT and OT would be appropriate- plan on tomorrow after dialysis.      DVT Prophylaxis: Heparin SQ  Code Status: Full Code  Functional Status: Independent at baseline  Diet: Dialysis  Christie: Not in place  Disposition. Home after dialysis tomorrow.        Interval History:      Better today- more alert and stronger. No new problems.                   Physical Exam:      Last Vital Signs:  /57   Pulse 60   Temp 96.8  F (36  C) (Oral)   Resp 16   Ht 1.803 m (5' 11\")   Wt 90.2 kg (198 lb 13.7 oz)   SpO2 90%   BMI 27.73 kg/m      Intake/Output Summary (Last 24 hours) at 10/25/2019 1336  Last data filed at 10/25/2019 1222  Gross per 24 hour   Intake 540 ml   Output --   Net 540 ml       GENERAL:  Comfortable. Cooperative.  PSYCH: pleasant, oriented, No acute distress.  EYES: PERRLA, Normal conjunctiva.  HEART:  Regular rate and rhythm. " No JVD. Pulses normal. No edema.  LUNGS:  Clear to auscultation, normal Respiratory effort.  ABDOMEN:  Soft, no hepatosplenomegaly, normal bowel sounds.  EXTREMETIES: No clubbing, cyanosis or ischemia  SKIN:  Dry to touch, No rash.           Medications:      All current medications were reviewed.         Data:      All new lab and imaging data was reviewed.   Labs:       Lab Results   Component Value Date     10/24/2019     10/23/2019     10/22/2019    Lab Results   Component Value Date    CHLORIDE 89 10/24/2019    CHLORIDE 90 10/23/2019    CHLORIDE 92 10/22/2019    Lab Results   Component Value Date    BUN 92 10/24/2019    BUN 70 10/23/2019    BUN 96 10/22/2019      Lab Results   Component Value Date    POTASSIUM 5.1 10/24/2019    POTASSIUM 4.5 10/23/2019    POTASSIUM 4.6 10/22/2019    Lab Results   Component Value Date    CO2 27 10/24/2019    CO2 29 10/23/2019    CO2 24 10/22/2019    Lab Results   Component Value Date    CR 10.30 10/24/2019    CR 8.55 10/23/2019    CR 10.70 10/22/2019        Recent Labs   Lab 10/24/19  0825 10/23/19  1019 10/22/19  0656 10/21/19  0718 10/20/19  0542   WBC  --  5.1  --   --  5.0   HGB 8.2* 8.5* 9.0*  --  9.3*   HCT  --  28.5*  --   --  31.9*   MCV  --  93  --   --  94    134*  --  142* 136*

## 2019-10-25 NOTE — PLAN OF CARE
Pt came in for HTN emergency. VSS. No complaints of pain this shift. Had dialysis this afternoon. . Tele % V paced. On RA stating 95%. Up with A1 and walker. Plan is to discharge tomorrow.

## 2019-10-26 NOTE — PLAN OF CARE
A&O X4. VSS on RA; BP systolic in the 150's-140's.Tele SR w/ 1st degree AV Block and occasionally A-paced. Assist of 1 w/walker. Renal diet. PIV SL. Denies N/V and SOB. BLE edema 1+. Dialysis M, T,Thu, Sat. L fistula; WDL; dressing CDI. BG 89. PT following. Plan to discharge today after dialysis with home PT/OT. Continue to monitor.

## 2019-10-26 NOTE — PLAN OF CARE
"[place]: A&O, forgetful  VS: BP (!) 153/67 (BP Location: Right arm)   Pulse 67   Temp 98.5  F (36.9  C) (Oral)   Resp 18   Ht 1.803 m (5' 11\")   Wt 89.3 kg (196 lb 14.4 oz)   SpO2 92%   BMI 27.46 kg/m    Tele: SR, 1st AVB, occ A paced  LS: dim on RA, denies SOB  GI: active, denies nausea, last BM 10/25  : hemodialysis   Skin: intact  Activity: SBA   Diet: dialysis   Pain: denies  Lab:   Plan: discharge today    Pt returned from dialysis at 0955.            "

## 2019-10-26 NOTE — PROGRESS NOTES
Potassium   Date Value Ref Range Status   10/26/2019 4.9 3.4 - 5.3 mmol/L Final     Hemoglobin   Date Value Ref Range Status   10/24/2019 8.2 (L) 13.3 - 17.7 g/dL Final     Creatinine   Date Value Ref Range Status   10/26/2019 9.26 (H) 0.66 - 1.25 mg/dL Final     Urea Nitrogen   Date Value Ref Range Status   10/26/2019 76 (H) 7 - 30 mg/dL Final     Sodium   Date Value Ref Range Status   10/26/2019 128 (L) 133 - 144 mmol/L Final     INR   Date Value Ref Range Status   05/13/2019 1.10 0.86 - 1.14 Final       DIALYSIS PROCEDURE NOTE  Hepatitis status of previous patient on machine log was checked and verified ok to use with this patients hepatitis status.  Patient dialyzed for 2.5 hrs. on a 2 K bath with a net fluid removal of  3L.  A BFR of 400 ml/min was obtained via a LUAF using 15gauge needles.    The treatment plan was dicussed with Dr. Hamilton during the treatment.  Total heparin received during the treatment: 500 units.   Needle cannulation sites held x 25min, pressure dressings applied after hemostasis.  Meds  given: EPO 15,000units Hectoral 2mcg IV Complications: NONE    Access care education provided orally saman patient, patient verbalized understanding  ICEBOAT? Timeout performed pre-treatment  I: Patient was identified using 2 identifiers  C: Consent obtained/verified current before treatment  E: Equipment preventative maintenance is current and dialysis delivery system OK to use  B: Hepatitis B Surface Antigen: neg; Draw Date: 1/29/19      Hepatitis B Surface Antibody: Immune; Draw Date: 1/29/19  O: Dialysis orders present and complete prior to treatment  A: Vascular access verified and assessed prior to treatment  T: Treatment was performed at a clinically appropriate time  ?: Patient was allowed to ask questions and address concerns prior to treatment  See flowsheet in EPIC for further details and post assessment.  Machine water alarm in place and functioning. Transducer pods intact and checked every  15min.  Pt returned via wheelchair  Report received from: JERSEY Trivedi  Report given to: EVAN Early R.N.  Chlorine/Chloramine water system checked every 4 hours.  Outpatient Dialysis at [facility]

## 2019-10-26 NOTE — PLAN OF CARE
Pt A/O, alarm on for safety. Denies pain. Tele SR A paced with murmur. Pulses palpable, no edema, neuropathy present. RA, no SOB, sats adequate, lungs dim, afebrile. BS+, no N/V, poor appetite, passing gas, LBM yesterday. Fistula to LUE with bruit and thrill. Dialysis in AM. Up with assist of 1, GB and walker. Ambulated hallway x 1. PIV in RUE. VSS. /63 and stable.

## 2019-10-26 NOTE — PLAN OF CARE
"Patient's After Visit Summary was reviewed with patient and/or brother.   Patient verbalized understanding of After Visit Summary, recommended follow up and was given an opportunity to ask questions.   Discharge medications sent home with patient/family: No rx sent to [facility] for lantus, humalog and coreg   Discharged with brother.    Pt's home medications 3, given back to pt.   Belongings taken with. Pt wheeled to front lobby by Yumiko OTOOLE.  Pt's brother is driving pt home where he lives with pt and pt's spouse.  Home nursing/PT/OT services ordered.    BP (!) 153/67 (BP Location: Right arm)   Pulse 67   Temp 98.5  F (36.9  C) (Oral)   Resp 18   Ht 1.803 m (5' 11\")   Wt 89.3 kg (196 lb 14.4 oz)   SpO2 92%   BMI 27.46 kg/m          "

## 2019-10-26 NOTE — PLAN OF CARE
Physical Therapy Discharge Summary    Reason for therapy discharge:    Discharged to home with home therapy.    Progress towards therapy goal(s). See goals on Care Plan in [facility] electronic health record for goal details.  Goals met    Therapy recommendation(s):    Continued therapy is recommended.  Rationale/Recommendations:  Home PT to address strength deficits and help with return to PLOF. .

## 2019-10-26 NOTE — DISCHARGE SUMMARY
"Discharge Summary    Donald Raza MRN# [id number]   YOB: 1950 Age: 69 year old     Date of Admission:  10/15/2019  Date of Discharge:  10/26/2019  Admitting Physician:  Tam Fraser MD  Discharge Physician:  Jovi Hayes MD  Discharging Service:  Hospitalist       Primary Provider: Sukh Owen          Discharge Diagnosis:   1. Hypertensive urgency  2. Metabolic encephalopathy due to hypertensive urgency and underlying end stage renal disease   3. End stage renal disease on hemodialysis four times weekly (Mon, Tues, Thurs, and Sat)  4. Coronary artery disease  5. Hyperkalemia, resolved with hemodialysis  6. Wide-complex QRS in the setting of hyperkalemia  7. Type 2 diabetes, insulin dependant  8. COPD without acute exacerbation  9. HIV on anti-retroviral therapy  10. Hperlipidemia: Continue antiretrovirals and statin as at home  11. Physical deconditioning.              Discharge Disposition:   Discharged to home           Allergies:   Allergies   Allergen Reactions     Calcium Acetate Other (See Comments)     Other reaction(s): Other, see comments  Pain in chest and back  Pain in chest area (sensitive skin)      Contrast Dye Other (See Comments)     Contrast nephropathy     Diagnostic X-Ray Materials Other (See Comments)     PN: renal failure     Lisinopril      Sulfa Drugs                 Condition on Discharge:   Discharge condition: Stable   Discharge vitals: Blood pressure (!) 152/61, pulse 67, temperature 98.5  F (36.9  C), temperature source Oral, resp. rate 18, height 1.803 m (5' 11\"), weight 89.3 kg (196 lb 14.4 oz), SpO2 92 %.   Code status on discharge: Full Code   Physical exam on day of discharge:   GENERAL:  Comfortable. Cooperative.  PSYCH: pleasant, oriented, No acute distress.  EYES: PERRLA, Normal conjunctiva.  HEART:  Regular rate and rhythm. No JVD. Pulses normal. No edema.  LUNGS:  Clear to auscultation, normal Respiratory effort.  ABDOMEN:  Soft, no " hepatosplenomegaly, normal bowel sounds.  EXTREMETIES: No clubbing, cyanosis or ischemia  SKIN:  Dry to touch, No rash.         History of Present Illness and Hospital Course:     See detailed admission note for full details.  Donald Raza is a 69 year old male with history of CAD with prior stenting to the LAD, preserved ejection fraction of 55 to 60% by echocardiogram in 5/2019, TIA, end-stage renal disease on hemodialysis, type 2 diabetes, HIV for which he is on anti-retroviral therapy, hypertension, HLP, multiple admissions for anginal type chest pain with negative work-ups, and history of uncontrolled hypertension in the setting of variable compliance with multiple medications and dialysis.  He presented to the ED on 10/15/2019 with shortness of breath, diaphoresis, chest pain, and elevated blood pressure.  Work-up showed mild troponin elevation.  Echocardiogram showed no wall motion abnormality but did show some progression of aortic stenosis.  Overall, his presentation was thought to be due to hypertensive urgency. He underwent dialysis with ultrafiltration as well as aggressive antihypertensive management and remained unwell.    He had symptoms of dizziness, nausea, and shortness of breath without diaphoresis. He had a single episode of chest pain. This did not seem consistent with acute coronary artery disease but possibly due to a relative hypotensive state. He improved after gentle fluid administration but showed symptoms of altered mental status. Overnight into 10/22 Donald developed a wide-complex QRS in the setting of evolving hyperkalemia at 6.5.  He underwent urgent dialysis and resolution of hyperkalemia and the wide-complex QRS. He continued to have waxing and waning level of alertness but ultimately seemed to improve on 10/24/19. His strength improved. By the end of hospital stay Donald' blood pressure was under better control after fluid removal on dialysis and lowered dry weight. Prior to  admission antihypertensive regimen has been decreased (Amlodipine was stopped and Coreg dose decreased from 25 to 12.5 mg BID). Donald is doing much better. He was quite weak earlier in stay and it was expected that TCU would be needed on discharge. As Donald' mental status improved his strength improved, also. He is able to discharge home today with home RN, PT, OT, SW, and HHA. Of note, Donald is needing much less insulin on discharge than prior to admission. I suspect in the coming days his insulin needs will increase to be closer to his prior to admission needs.               Procedures / Imaging:   CXR  Echocardiogram   CT of head           Consultations:   Consultation during this admission received from nephrology and neurology             Pending Results:   None           Discharge Instructions and Follow-Up:   Discharge diet: Dialysis diet   Discharge activity: Activity as tolerated   Discharge follow-up: Follow up for HD as before on Mondays, Tuesdays, Thursdays, and Saturdays.   Follow up with PCP in one week for BP check   Outpatient therapy: Home PT and OT    Other instructions: Home RN, SW, and HHA             Discharge Medications:   Current Discharge Medication List      START taking these medications    Details   insulin lispro (HUMALOG PENFILL) 100 UNIT/ML Cartridge Inject 2 Units Subcutaneous 3 times daily (before meals)  Qty: 3 mL, Refills: 3    Associated Diagnoses: Type 2 diabetes mellitus with hyperosmolarity without coma, with long-term current use of insulin (H)      insulin lispro (HUMALOG VIAL) 100 UNIT/ML vial Inject Subcutaneous 3 times daily (before meals) Takes 2 units for every 50 BG above 150.  Qty: 3 mL, Refills: 3    Associated Diagnoses: Type 2 diabetes mellitus with hyperosmolarity without coma, with long-term current use of insulin (H)         CONTINUE these medications which have CHANGED    Details   carvedilol (COREG) 12.5 MG tablet Take 1 tablet (12.5 mg) by mouth 2 times daily  (with meals)  Qty: 60 tablet, Refills: 1    Associated Diagnoses: Hypertensive emergency      insulin glargine (LANTUS PEN) 100 UNIT/ML pen Inject 9 Units Subcutaneous 2 times daily  Qty: 3 mL, Refills: 3    Comments: If Lantus is not covered by insurance, may substitute Basaglar at same dose and frequency.    Associated Diagnoses: Hypertensive emergency         CONTINUE these medications which have NOT CHANGED    Details   abacavir (ZIAGEN) 300 MG tablet Take 600 mg by mouth every evening       aspirin 81 MG EC tablet Take 81 mg by mouth every evening      cinacalcet (SENSIPAR) 30 MG tablet Take 30 mg by mouth three times a week On dialysis days      cloNIDine (CATAPRES) 0.1 MG tablet Take 0.1 mg by mouth daily as needed (for high blood pressure)       guaiFENesin (MUCINEX) 600 MG 12 hr tablet Take 1,200 mg by mouth 2 times daily as needed       hypromellose-dextran (ARTIFICAL TEARS) 0.1-0.3 % ophthalmic solution Place 1 drop into both eyes daily as needed for dry eyes       imiquimod (ALDARA) 5 % cream Apply topically as needed      ipratropium - albuterol 0.5 mg/2.5 mg/3 mL (DUONEB) 0.5-2.5 (3) MG/3ML neb solution Take 1 vial (3 mLs) by nebulization every 6 hours as needed for shortness of breath / dyspnea or wheezing  Qty: 90 vial, Refills: 1    Associated Diagnoses: Acute respiratory failure with hypoxia (H)      melatonin 5 MG tablet Take 5 mg by mouth nightly as needed       polyethylene glycol (MIRALAX/GLYCOLAX) packet Take 1 packet by mouth daily as needed for constipation      sucroferric oxyhydroxide (VELPHORO) 500 MG CHEW chewable tablet Take 500 mg by mouth 3 times daily (with meals)      umeclidinium (INCRUSE ELLIPTA) 62.5 MCG/INH inhaler Inhale 1 puff into the lungs daily      Acetaminophen (TYLENOL PO) Take 325 mg by mouth every 6 hours as needed for mild pain or fever       albuterol (PROAIR HFA/PROVENTIL HFA/VENTOLIN HFA) 108 (90 BASE) MCG/ACT Inhaler Inhale 2 puffs into the lungs every 6 hours as  needed for shortness of breath / dyspnea or wheezing  Qty: 1 Inhaler, Refills: 1    Associated Diagnoses: Cough      atorvastatin (LIPITOR) 40 MG tablet Take 40 mg by mouth At Bedtime      B COMPLEX-C-FOLIC ACID PO Take 1 tablet by mouth At Bedtime       chlorhexidine (CHLORHEXIDINE) 0.12 % solution Rinse and gargle 15 ml by mouth twice a day as directed.      CLONAZEPAM PO Take 0.5 mg by mouth nightly as needed for anxiety (restless legs)      dapsone 100 MG tablet Take 100 mg by mouth At Bedtime       Darunavir Ethanolate (PREZISTA PO) Take 800 mg by mouth At Bedtime.      dolutegravir (TIVICAY) 50 MG tablet Take 50 mg by mouth At Bedtime      DOXERCALCIFEROL IV Inject 1 mcg into the vein three times a week (with dialysis); per [facility] dialysis records 10/15/19      epoetin brittni (EPOGEN,PROCRIT) 91537 UNIT/ML injection Inject 15,000 Units Subcutaneous three times a week WITH DIALYSIS; per [facility] dialysis records 10/15/19      !! gabapentin (NEURONTIN) 300 MG capsule Take 300 mg by mouth as needed May take after HD.      !! gabapentin (NEURONTIN) 300 MG capsule Take 300 mg by mouth 2 times daily       irbesartan (AVAPRO) 300 MG tablet Take 1 tablet (300 mg) by mouth At Bedtime  Qty: 30 tablet, Refills: 0    Associated Diagnoses: Hypertension secondary to other renal disorders      Isosorbide Mononitrate  MG TB24 Take 1 tablet (120 mg) by mouth every evening  Qty: 30 tablet, Refills: 11    Associated Diagnoses: Coronary artery disease involving native heart, angina presence unspecified, unspecified vessel or lesion type; Hypertension secondary to other renal disorders      ketoconazole (NIZORAL) 2 % cream Apply topically daily Between toes for fungal infection      MAGNESIUM OXIDE PO Take 400 mg by mouth At Bedtime      mirtazapine (REMERON) 15 MG tablet Take 15 mg by mouth At Bedtime      nitroglycerin (NITROSTAT) 0.4 MG SL tablet One tablet under the tongue every 5 minutes if needed for chest pain. May repeat  "every 5 minutes for a maximum of 3 doses in 15 minutes\"  Qty: 25 tablet, Refills: 3    Associated Diagnoses: Coronary artery disease due to lipid rich plaque; Status post coronary angioplasty      Nutritional Supplements (NEPRO PO) Take 1 Container by mouth 3 times daily 1-3 times daily      omega-3 acid ethyl esters (LOVAZA) 1 G capsule Take 2 g by mouth 2 times daily      pantoprazole (PROTONIX) 40 MG EC tablet Take 40 mg by mouth daily      ritonavir (NORVIR) 100 MG capsule Take 1 capsule by mouth At Bedtime        !! - Potential duplicate medications found. Please discuss with provider.      STOP taking these medications       amLODIPine (NORVASC) 5 MG tablet Comments:   Reason for Stopping:         insulin lispro (HUMALOG PEN) 100 UNIT/ML pen Comments:   Reason for Stopping:         insulin lispro (HUMALOG PEN) 100 UNIT/ML pen Comments:   Reason for Stopping:                  Total time spent in face to face contact with the patient and coordinating discharge was:  45 Minutes    "

## 2019-10-28 NOTE — PROGRESS NOTES
Dear Dr. Hayes  Medicare Home Health regulations requires [facility] to provide an initial assessment visit either within 48 hours of the patient's return home, or on the physician ordered Start of Care date.    There will be a delay in the Resumption of care Assessment for Donald Raza; MRN [id number]  We anticipate the Start of care will be 10/28,29/19  date.      Sincerely [facility]  Kady Brvao RN  [phone]

## 2019-10-29 NOTE — PROGRESS NOTES
Brief sw note:    Pt's Karen LEARY -[phone], left sw a vm requesting an update on the pt's discharge.  Sw called Karen and left a vm that the pt discharged home on 10/26 with [facility] RN/PT/OT/SW/HHA.    BRIEN Johnson, LGSW  [phone]  [facility]

## 2019-11-26 PROBLEM — I24.9 ACS (ACUTE CORONARY SYNDROME) (H): Status: ACTIVE | Noted: 2019-01-01

## 2019-11-26 PROBLEM — J81.1 PULMONARY EDEMA: Status: ACTIVE | Noted: 2019-01-01

## 2019-11-26 NOTE — ED PROVIDER NOTES
History     Chief Complaint:  Chest Pain    HPI   Donald Raza is a 69 year old male with a complex past medical history including COPD, hypertension, hyperlipidemia, diabetes, and end stage renal disease on dialysis who presents with chest pain and shortness of breath. The patient endorses the onset of chest pressure with associated shortness of breath 2-3 hours prior to arrival. He also endorses the sensation that something is stuck in his throat. He describes that yesterday he also felt a chest pressure and took 2 nitroglycerin with relief of his symptoms. He also vomited yesterday but has had no recurrent episodes. The patient denies any fever or cough. His last dialysis was Sat.     Allergies:  Calcium acetate  Contrast dye  Diagnostic xray materials  Lisinopril  Sulfa drugs      Medications:    Ziagen  Albuterol  Aspirin  Lipitor  Coreg  Clonazepam  Sensipar  Catapres  Dapson  Prezista  Tivicay  Doxercalciferol  Epogen  Neurontin  Lantus  Humalog  Duoneb  Abapro  Magnesium oxide  Remeron  Nitrostat  Nepro  Lovaza  Protonix  Norvir  Velphoro  Incruse Ellipta    Past Medical History:    Allergic rhinitis  Anemia due to chronic kidney disease  Coronary artery disease  Cataract  Chronic kidney disease  Colon cancer  Chronic obstructive pulmonary disease  Depression  Dilated aortic root  Diverticulitis  End stage renal disease on dialysis  Gout  Human immunodeficiency virus disease  Squamous cell carcinoma  Hypertension   Impotence of organic origin  Elevated prostate specific antigen velocity  Hyperlipidemia  Pulmonary hypertension  Transient ischemic attack  Type II diabetes    Past Surgical History:    Angiogram x2  Appendectomy  Laparoscopic cholecystectomy  Colostomy  EP pacemaker  Left heart catheterization  Heart catheterization, angioplasty  Dialysis fistulogram left  Coronary stent x2    Family History:    Heart Disease  Kidney Disease  HTN    Social History:  The patient was accompanied to the ED by  brother.  Smoking Status: Former Smoker, quit in 2003  Smokeless Tobacco: Negative  Alcohol Use: Negative  Drug Use: Negative  Marital Status:        Review of Systems   Constitutional: Negative for fever.   Respiratory: Positive for shortness of breath. Negative for cough.    Cardiovascular: Positive for chest pain.   Gastrointestinal: Positive for vomiting.   All other systems reviewed and are negative.      Physical Exam     Patient Vitals for the past 24 hrs:   BP Temp Temp src Heart Rate Resp SpO2 Weight   11/26/19 0357  158/72 96.3  F (35.7  C) Oral 86 20 94 % 90.9 kg (200 lb 8 oz)   11/26/19 0330 164/88 -- -- 86 23 -- --   11/26/19 0315  165/83 -- -- 80 21 -- --   11/26/19 0300 181/88 -- -- 78 23 90 % --   11/26/19 0245  178/89 -- -- 77 23 92 % --   11/26/19 0230  173/88 -- -- 70 18 92 % --   11/26/19 0220  187/90 -- -- 75 21 90 % --   11/26/19 0215 187/90 -- -- 76 15 91 % --   11/26/19 0210 184/89 -- -- -- -- -- --   11/26/19 0150 -- -- -- 73 21 95 % --   11/26/19 0145 -- -- -- 72 20 95 % --   11/26/19 0140 -- -- -- 72 21 95 % --   11/26/19 0130 -- -- -- 80 29 93 % --   11/26/19 0120 182/86 -- -- -- -- -- --   11/26/19 0116  208/95 -- -- -- -- -- --   11/26/19 0114 -- 97.8  F (36.6  C) Temporal 79 22 94 % 87 kg (191 lb 12.8 oz)     Physical Exam  Nursing note and vitals reviewed.  Constitutional: Cooperative.   HENT:   Mouth/Throat: Mucous membranes are normal.   Cardiovascular: Normal rate, regular rhythm.  Pacemaker in right upper chest. 3/6 systolic murmur  Pulmonary/Chest: Effort normal. No respiratory distress. No wheezes.Crackles at the bases of both lungs.  Abdominal: Soft. Normal appearance and bowel sounds are normal. No distension. There is no tenderness. There is no rigidity and no guarding.   Musculoskeletal: No LE edema  Neurological: Alert. oriented x4.  Strength normal.   Skin: Skin is warm and dry. No rash noted. Fistula in left upper extremity  Psychiatric: Normal mood and affect.      Emergency Department Course   ECG:  Indication: Chest Pressure  Time: 0127  Vent. Rate 78 bpm. OR interval 178. QRS duration 82. QT/QTc 388/442. P-R-T axis 21 42 120.  Normal sinus rhythm. Marked ST abnormality, possible lateral subendocardial injury. ST laterally (V5-V6) new compared to EKG dated 10/15/19. Read time: 0128    Indication: Repeat EKG  Time: 0335  Vent. Rate 87 bpm. OR interval 212. QRS duration 100. QT/QTc 384/462. P-R-T axis 38 80 128.  Sinus rhythm with 1st degree AV block. Marked ST abnormality, possible anterolateral subedocardial injury. More pronounced ST abnormality than earlier EKG  dated 11/26/19. Read time: 0342    Imaging:  Radiographic findings were communicated with the patient and family who voiced understanding of the findings.  XR Chest 2 views:   Mild increased pulmonary vascularity and mildly increased interstitial opacities, suggestive of interstitial pulmonary edema.  As per radiology.     Laboratory:  CBC: WBC: 6.3, HGB: 8.2 (L), PLT: 160  BMP: Glucose 280 (H),  (L), Potassium 3.3 (L), Chloride 93 (L), BUN 87 (H), GFR 5 (L), o/w WNL (Creatinine: 8.79 (H))    0146 Troponin: 0.077  BNP: 68,126 (H)    Interventions:  0151 aspirin 325 mg PO  0152 nitroglycerin 0.4 mg Sublingual  0213 nitroglycerin 0.4 mg Sublingual  0249 nitroglycerin 0.4 mg patch, transdermal    Emergency Department Course:  Nursing notes and vitals reviewed. (0126) I performed an exam of the patient as documented above.      IV inserted. Medicine administered as documented above. Blood drawn. This was sent to the lab for further testing, results above.     The patient was sent for a XR Chest while in the emergency department, findings above.      (0227) I rechecked the patient and discussed the results of his workup thus far. He states that his chest pain has improved with the nitroglycerin.     (0242) I consulted with Dr. Worthy of the hospitalist services. They are in agreement to accept the patient for  admission.    Findings and plan explained to the Patient who consents to admission. Discussed the patient with Dr. Szymanski, who will admit the patient to a bed for further monitoring, evaluation, and treatment.     I personally reviewed the laboratory and imaging results with the Patient and answered all related questions prior to admission.    Impression & Plan    Medical Decision Making:  Donald Raza is a 69 year old male with a complex medical history including end stage renal disease, coronary artery disease, and HIV who presents with chest pain and shortness of breath. Exam consistent with fluid overload and pulmonary edema, likely in need of dialysis. He is not in respiratory extremis, and does not have an oxygen requirement. After nitroglycerin his chest pain has resolved and his blood pressure has improved. We placed a nitroglycerin patch and he has received aspirin. I would hold on any further anticoagulation at this time as this is likely more demand ischemia with mild strain seen laterally on his EKG. Troponin was elevated but below what seems to be his baseline and likely again due to his renal insufficiency. He will be admitted at this time in stable condition. Echocardiogram can be considered if there are any concerns, especially if he does not improve following dialysis.     When the patient was being transferred up to the floor, he did have some more chest pain which prompted a second EKG. This shows increase in the ST depression now including V4-6 compared to the EKG earlier. There is no ST elevation. This is likely still related to his fluid overload and cardiac ischemia due to cardiac demands. I've let the admitting physician known the patient should be rechecked upon arrival to the floor to ensure he is doing well as the patient is already en route when I viewed the EKG.    Diagnosis:    ICD-10-CM   1. Chest pain, unspecified type R07.9   2. Pulmonary edema cardiac cause (H) I50.1   3. Elevated  troponin I level R79.89   4. ESRD on hemodialysis (H) N18.6    Z99.2   5. Anemia of chronic disease D63.8   6. Hypertensive urgency I16.0       Disposition:  Admitted to Hospital    Scribe Disclosure:  ILaura, am serving as a scribe on 11/26/2019 at 1:25 AM to personally document services performed by Christian Geiger MD based on my observations and the provider's statements to me.     Laura Purdy  11/26/2019   [facility] EMERGENCY DEPARTMENT       Christian Geiger MD  11/26/19 0410

## 2019-11-26 NOTE — PHARMACY-ADMISSION MEDICATION HISTORY
Admission medication history interview status for this patient is complete. See [facility] admission navigator for allergy information, prior to admission medications and immunization status.     Medication history interview source(s):Patient  Medication history resources (including written lists, pill bottles, clinic record): Discharge Summary from 10.26.19  Primary pharmacy:[facility].    Changes made to PTA medication list:  Added: none  Deleted: none  Changed: Nephro (nutritional supplement) from 1-3 times daily to 2-3 times daily, Velphoro from 1 tab tid to 2 tabs bid.    Actions taken by pharmacist (provider contacted, etc):None     Additional medication history information: Dialysis  3 times/wk on Tue, Thus, Sat.    Medication reconciliation/reorder completed by provider prior to medication history?  No     Do you take OTC medications (eg tylenol, ibuprofen, fish oil, eye/ear drops, etc)? Yes     For patients on insulin therapy: Yes  Lantus/levemir/NPH/toujeo/tresiba/Mix 70/30 dose:  Yes  Sliding scale Novolog: Yes    Prior to Admission medications    Medication Sig Last Dose Taking? Auth Provider   abacavir (ZIAGEN) 300 MG tablet Take 600 mg by mouth every evening  11/25/2019 at Unknown time Yes Abstract, Provider   albuterol (PROAIR HFA/PROVENTIL HFA/VENTOLIN HFA) 108 (90 BASE) MCG/ACT Inhaler Inhale 2 puffs into the lungs every 6 hours as needed for shortness of breath / dyspnea or wheezing prn Yes Nelson Worthy MD   aspirin 81 MG EC tablet Take 81 mg by mouth every evening 11/25/2019 at Unknown time Yes Unknown, Entered By History   atorvastatin (LIPITOR) 40 MG tablet Take 40 mg by mouth At Bedtime 11/25/2019 at Unknown time Yes Unknown, Entered By History   B COMPLEX-C-FOLIC ACID PO Take 1 tablet by mouth At Bedtime  11/25/2019 at Unknown time Yes Reported, Patient   carvedilol (COREG) 12.5 MG tablet Take 1 tablet (12.5 mg) by mouth 2 times daily (with meals) 11/25/2019 at Unknown  time Yes Jovi Hayes MD   chlorhexidine (CHLORHEXIDINE) 0.12 % solution Rinse and gargle 15 ml by mouth twice a day as directed. 11/25/2019 at Unknown time Yes Abstract, Provider   cinacalcet (SENSIPAR) 30 MG tablet Take 30 mg by mouth three times a week On dialysis days 11/23/2019 Yes Unknown, Entered By History   CLONAZEPAM PO Take 0.5 mg by mouth nightly as needed for anxiety (restless legs) prn Yes Unknown, Entered By History   cloNIDine (CATAPRES) 0.1 MG tablet Take 0.1 mg by mouth daily as needed (for high blood pressure)  prn Yes Unknown, Entered By History   dapsone 100 MG tablet Take 100 mg by mouth At Bedtime  11/25/2019 at Unknown time Yes Reported, Patient   Darunavir Ethanolate (PREZISTA PO) Take 800 mg by mouth At Bedtime. 11/25/2019 at Unknown time Yes Reported, Patient   dolutegravir (TIVICAY) 50 MG tablet Take 50 mg by mouth At Bedtime 11/25/2019 at Unknown time Yes Unknown, Entered By History   DOXERCALCIFEROL IV Inject 1 mcg into the vein three times a week (with dialysis); per [facility] dialysis records 10/15/19 11/23/2019 Yes Reported, Patient   epoetin brittni (EPOGEN,PROCRIT) 26744 UNIT/ML injection Inject 15,000 Units Subcutaneous three times a week WITH DIALYSIS; per [facility] dialysis records 10/15/19 11/23/2019 Yes Unknown, Entered By History   gabapentin (NEURONTIN) 300 MG capsule Take 300 mg by mouth as needed May take after HD. prn Yes Unknown, Entered By History   gabapentin (NEURONTIN) 300 MG capsule Take 300 mg by mouth 2 times daily  11/25/2019 at Unknown time Yes Unknown, Entered By History   guaiFENesin (MUCINEX) 600 MG 12 hr tablet Take 1,200 mg by mouth 2 times daily as needed  prn Yes Unknown, Entered By History   hypromellose-dextran (ARTIFICAL TEARS) 0.1-0.3 % ophthalmic solution Place 1 drop into both eyes daily as needed for dry eyes  prn Yes Unknown, Entered By History   imiquimod (ALDARA) 5 % cream Apply topically as needed prn Yes Reported, Patient   insulin glargine  "(LANTUS PEN) 100 UNIT/ML pen Inject 9 Units Subcutaneous 2 times daily 11/25/2019 at Unknown time Yes Jovi Hayes MD   insulin lispro (HUMALOG PENFILL) 100 UNIT/ML Cartridge Inject 2 Units Subcutaneous 3 times daily (before meals) 11/25/2019 at Unknown time Yes Jovi Hayes MD   insulin lispro (HUMALOG VIAL) 100 UNIT/ML vial Inject Subcutaneous 3 times daily (before meals) Takes 2 units for every 50 BG above 150.  Yes Jovi Hayes MD   ipratropium - albuterol 0.5 mg/2.5 mg/3 mL (DUONEB) 0.5-2.5 (3) MG/3ML neb solution Take 1 vial (3 mLs) by nebulization every 6 hours as needed for shortness of breath / dyspnea or wheezing prn Yes Catrachito Rosado,    irbesartan (AVAPRO) 300 MG tablet Take 1 tablet (300 mg) by mouth At Bedtime 11/25/2019 at Unknown time Yes Clemencia Pal MD   Isosorbide Mononitrate  MG TB24 Take 1 tablet (120 mg) by mouth every evening 11/25/2019 at Unknown time Yes Yuki Hinojosa APRN CNP   ketoconazole (NIZORAL) 2 % cream Apply topically daily Between toes for fungal infection 11/25/2019 at Unknown time Yes Reported, Patient   MAGNESIUM OXIDE PO Take 400 mg by mouth At Bedtime 11/25/2019 at Unknown time Yes Unknown, Entered By History   melatonin 5 MG tablet Take 5 mg by mouth nightly as needed  prn Yes Reported, Patient   mirtazapine (REMERON) 15 MG tablet Take 15 mg by mouth At Bedtime 11/25/2019 at Unknown time Yes Reported, Patient   nitroglycerin (NITROSTAT) 0.4 MG SL tablet One tablet under the tongue every 5 minutes if needed for chest pain. May repeat every 5 minutes for a maximum of 3 doses in 15 minutes\" prn Yes Lay Pza MD   Nutritional Supplements (NEPRO PO) Take 1 Container by mouth 3 times daily 2-3 times daily 11/25/2019 at Unknown time Yes Unknown, Entered By History   omega-3 acid ethyl esters (LOVAZA) 1 G capsule Take 2 g by mouth 2 times daily 2 wks ago Yes Unknown, Entered By History   pantoprazole (PROTONIX) 40 MG EC tablet Take 40 " mg by mouth daily 11/25/2019 at Unknown time Yes Unknown, Entered By History   polyethylene glycol (MIRALAX/GLYCOLAX) packet Take 1 packet by mouth daily as needed for constipation prn Yes Reported, Patient   ritonavir (NORVIR) 100 MG capsule Take 1 capsule by mouth At Bedtime  11/25/2019 at Unknown time Yes Reported, Patient   sucroferric oxyhydroxide (VELPHORO) 500 MG CHEW chewable tablet Take 1,000 mg by mouth 2 times daily  11/25/2019 at Unknown time Yes Unknown, Entered By History   umeclidinium (INCRUSE ELLIPTA) 62.5 MCG/INH inhaler Inhale 1 puff into the lungs daily 11/25/2019 at Unknown time Yes Unknown, Entered By History

## 2019-11-26 NOTE — PROVIDER NOTIFICATION
Dr. Gerard paged: K 3.3, no replacement protocol. Pt has nitro patch in place from ED but no orders for when to remove, could you add one? Thank you!    Addendum: one time 20 meq K ordered.

## 2019-11-26 NOTE — PROGRESS NOTES
SPIRITUAL HEALTH SERVICES Progress Note  [facility] Med Surg 3    Spiritual Health visit per routine admission hospital  request.  Pt is known to me from previous hospital stay. Brief visit during dialysis.   Will follow up as able tomorrow when back on the unit.    Plan: Spiritual Health Services remains available for additional emotional/spiritual support.    Delonte Quevedo MA  Staff   Pager: [phone]  Phone: [phone]

## 2019-11-26 NOTE — CONSULTS
CTS identifies pt as high risk due to high MUSHTAQ. Pt currently admitted for pulmonary edema and chest pain. Per chart review pt attends HD Tues, Thurs and Sat at [facility]. He is open to [facility] RN/PT/OT/SW. Attempted to meet w/ pt to assess for any discharge needs, he is currently in dialysis and lethargic. Will attempt to see again tomorrow.      CM will continue to follow patient for any additional discharge needs.     Radha Mayfield RN BSN   Inpatient Care Coordination  [facility]   [phone]

## 2019-11-26 NOTE — PLAN OF CARE
BP max 152/74 other VSS on RA. Denies pain. Tele SR w/ 1AVB and ST depression and occasional demand A pace. SOB and FRANCO present. Denies cp. , good appetite. L arm fistula bruit and thrill present. Plan for HD today. Nitro PIP. Troponin pending. Pt Assist x1 w/ gait belt and walker.

## 2019-11-26 NOTE — PLAN OF CARE
"Writer took over cares [id number]. Pt to dialysis around 1155, still there at 1530. Shortly after arriving to HD pt had episode of emesis, writer notified by dialysis RN. Pt denied CP, arm pain/tingling, SOB. 2L O2 per NC applied. PRN IV zofran 4 mg given w/ some relief of nausea. C/O lower abdominal pain/discomfort, refused offered PRN analgesics stating, \"I want to just rest for now, and try drinking some ice water.\" Safety checks performed throughout shift, pt sleeping comfortably. Trop 0.120, MD notified and ordered recheck @ 1500. Tele demand a-paced, denied CP . Continue POC.   "

## 2019-11-26 NOTE — CONSULTS
Renal Medicine Inpatient Dialysis Note                                Donald Raza MRN# [id number]   Age: 69 year old YOB: 1950   Date of Admission: 11/26/2019 Hospital LOS: 0          Assessment/Plan:     Admitted with CP and SOB        1.  ESRD              -[facility]              -Dr. Dominik Chavez              -AVF              -target weight 86              -off 11/23/19 at 87.1 kg  2.  Anemia              -MIHAI  3.  Mineral Bone Disease              -Hectorol   -Sensipar 60 mg with each dialysis  4.  HTN      UF to 4 liter as tolerated  [facility] schedule      Interval History:     Dialysis run parameters reviewed with dialysis RN at patient bedside    3.25 hour run  4K bath  4 liter UF  AVF    MIHAI and Hectorol given    Comfortable.  Less SOB      ROS     GENERAL: NAD, No fever,chills  E/M: NEGATIVE for ear, mouth and throat problems  RESP:dyspnea on exertion  CV: NEGATIVE for chest pain, palpitations  EXT: no change edema  ROS otherwise negative    Dialysis Parameters:     Vitals were reviewed  Patient Vitals for the past 8 hrs:   BP Temp Temp src Pulse Heart Rate Resp SpO2 Weight   11/26/19 1300 (!) 160/86 97.4  F (36.3  C) Oral 64 62 -- 98 % --   11/26/19 1230 (!) 179/89 -- -- 66 66 -- -- --   11/26/19 1212 -- -- -- -- 73 -- 95 % --   11/26/19 1200 (!) 189/93 97.2  F (36.2  C) Oral 68 70 18 96 % --   11/26/19 1100 -- -- -- -- -- -- -- 90.9 kg (200 lb 8 oz)   11/26/19 1039 -- -- -- -- -- -- 94 % --   11/26/19 1020 -- -- -- -- -- -- 92 % --   11/26/19 0849 (!) 152/74 97  F (36.1  C) Oral 64 -- 16 94 % --   11/26/19 0622 -- -- -- -- -- -- 93 % --     No intake/output data recorded.    Vitals:    11/26/19 0114 11/26/19 0357 11/26/19 1100   Weight: 87 kg (191 lb 12.8 oz) 90.9 kg (200 lb 8 oz) 90.9 kg (200 lb 8 oz)       Current Weight: 90.9  Dry Weight: 86  Dialysis Temp: 36.5  C  Access Device: AVF  Access Site: left  Dialyzer: Revaclear  Dialysis Bath: 4  Sodium Profile: n  UF Goal:  4  Blood Flow Rate (mL/min): 400  Total Treatment Time (hrs): 3.25  Heparin: Low dose as required      EPO dose: y  Zemplar: y  IV Fe: n      Medications and Allergies:     Reviewed      Physical Exam:     Seen and examined during course of dialysis run    BP (!) 160/86   Pulse 64   Temp 97.4  F (36.3  C) (Oral)   Resp 18   Wt 90.9 kg (200 lb 8 oz)   SpO2 98%   BMI 27.96 kg/m      GENERAL: awake, alert, follows  HEENT: NC/AT, PERRLA, EOMI, non icteric, pharynx moist without lesion  NECK: supple, no masses or adenopathy  RESP: clear anteriorly  CV: RRR, normal S1 S2  ABDOMEN: S/NT, BS present  MS: no edema  EXT: access canulated without issue    Data:       Recent Labs   Lab 11/26/19  0146   *   POTASSIUM 3.3*   CHLORIDE 93*   CO2 27   ANIONGAP 12   *   BUN 87*   CR 8.79*   GFRESTIMATED 5*   GFRESTBLACK 6*   PRABHA 9.5     No lab results found in last 7 days.  Recent Labs   Lab 11/26/19  0146   HGB 8.2*         G David Fuller MD    [facility] - Nephrology  [phone]

## 2019-11-26 NOTE — PROGRESS NOTES
Brief Update Note    Patient seen at end of dialysis session on dialysis unit.  He states he is feeling improved since he came in.  He has no chest pain at this time.  Breathing feels little short but is overall better.  Troponin trended up very slightly today, given no chest pain, feel this is likely a type II MI but will continue to trend until peak.    If patient continues to improve and feel better tomorrow, potentially discharged home at that time.    Asif Gerard MD

## 2019-11-26 NOTE — PROGRESS NOTES
Potassium   Date Value Ref Range Status   11/26/2019 3.3 (L) 3.4 - 5.3 mmol/L Final     Hemoglobin   Date Value Ref Range Status   11/26/2019 8.2 (L) 13.3 - 17.7 g/dL Final     Creatinine   Date Value Ref Range Status   11/26/2019 8.79 (H) 0.66 - 1.25 mg/dL Final     Urea Nitrogen   Date Value Ref Range Status   11/26/2019 87 (H) 7 - 30 mg/dL Final     Sodium   Date Value Ref Range Status   11/26/2019 132 (L) 133 - 144 mmol/L Final     INR   Date Value Ref Range Status   05/13/2019 1.10 0.86 - 1.14 Final       DIALYSIS PROCEDURE NOTE  Hepatitis status of previous patient on machine log was checked and verified ok to use with this patients hepatitis status.  Patient dialyzed for 3.25 hrs. on a 4 K bath with a net fluid removal of  4L.  A BFR of 400 ml/min was obtained via a FLA using 15gauge needles.    The treatment plan was dicussed with Dr. Fuller during the treatment.  Total heparin received during the treatment: 2100 units.   Needle cannulation sites held x 5 min, pressure dressings applied after hemostasis.    Meds  given: Epogen and Hectoral Complications: none    Patient nauseated and slept during entire procedure.  Education discussion regarding fluid restriction. Patient has a good understanding  ICEBOAT? Timeout performed pre-treatment  I: Patient was identified using 2 identifiers  C: Consent obtained/verified current before treatment  E: Equipment preventative maintenance is current and dialysis delivery system OK to use  B: Hepatitis B Surface Antigen: Negative; Draw Date: 10/29/19      Hepatitis B Surface Antibody: Immune; Draw Date: 11/21/19  O: Dialysis orders present and complete prior to treatment  A: Vascular access verified and assessed prior to treatment  T: Treatment was performed at a clinically appropriate time  ?: Patient was allowed to ask questions and address concerns prior to treatment  See flowsheet in EPIC for further details and post assessment.  Machine water alarm in place and  functioning. Transducer pods intact and checked every 15min.  Pt returned via wheelchair  Report received from: DANIEL Rodgers RN  Report given to: DANIEL Prieto RN  Chlorine/Chloramine water system checked every 4 hours.  Outpatient Dialysis at [facility]

## 2019-11-26 NOTE — H&P
H&P dictated.      ESRD on dialysis; admitted for CP and SOB    pulm edema on CXR    Needs dialysis    Nephrology consult

## 2019-11-26 NOTE — H&P
Admitted:     11/26/2019      CHIEF COMPLAINT:  Shortness of breath, chest pain.      HISTORY OF PRESENT ILLNESS:  Mr. Raza is a very pleasant 69-year-old male who is well-known to the Hospitalist Service here at [facility].  He has a history of end-stage renal disease, normally dialyzes 3 times a week.  He also has a history of type 2 diabetes mellitus, hypertension, chronic anemia, and HIV stable on antiretroviral medications.  He presents to the hospital today for chest pain or shortness of breath.  He reports his symptoms started yesterday.  He took a nitroglycerin, with incomplete relief of his symptoms.  He initially thought that perhaps he had something stuck in his throat, but that failed to clear when he tried drinking some water and eating some toast.  He noticed increasing shortness of breath and work of breathing.  He last dialyzed on Saturday.      In regards to his dialysis, the patient previously had been dialyzing 4 times a week, I believe due to issues with volume overload.  However, he now dialyzes 3 times a week but extends his dialysis sessions a bit longer than normal.  He denies any fevers or chills.  No other concerning symptoms that he reports.      On arrival to the ER, vital signs were notable for blood pressure of 180/85 with a heart rate of 93.  Afebrile, saturation 95% on room air, but dipped to 90% on room air while in the ER.  Workup included labs.  BNP is 70,000.  Troponin is detectable at 0.07.  Potassium is 3.3, and sodium is 132.  Creatinine is 8.8.  Hemoglobin is 8.2.  White cell count is normal.  Chest x-ray shows increased pulmonary vascularity, suggesting pulmonary edema.      I spoke with Dr. Geiger of the ER.  Plan is for admission to the hospital for pulmonary edema and need for dialysis.      PAST MEDICAL HISTORY:   1.  History of hypertension.   2.  History of HIV, stable on antiretroviral medications.   3.  Coronary artery disease with previous PCI.   4.   End-stage renal disease on maintenance dialysis Tuesday, Thursday, Saturday.   5.  Diabetes mellitus type 2, on insulin.   6.  TIA.   7.  Hypertension.   8.  Dyslipidemia.   9.  Chronic anemia, baseline hemoglobin in the 8-9 range.   10.  History of pacemaker.   11.  Multiple hospitalizations in the past for atypical chest pain and severe uncontrolled hypertension.   12.  Frequent troponin elevation apparent on lab review.  The patient frequently has detectable troponin enzymes on appearance in the hospital.   13.  History of nuclear medicine stress test performed 12/2018 showing normal myocardial perfusion and normal ejection fraction at 59%.   14.  Echocardiogram performed 10/2019 showing mild to moderate valvular aortic stenosis with valve area 1.3 cm2 and ejection fraction 60-65%.   15.  History of aortic valve stenosis, mild to moderate on most recent echocardiogram from 10/2019.      CURRENT MEDICATIONS:  Await pharmacy reconciliation medication list.      ALLERGIES:   1.  CALCIUM ACETATE.   2.  CONTRAST DYE.   3.  LISINOPRIL.   4.  SULFA.      FAMILY HISTORY:  Reviewed.  Nothing contributory to this admission.      SOCIAL HISTORY:  Denies smoking.  Denies any excessive alcohol use.  He is a full code, as I reviewed with him this evening.      REVIEW OF SYSTEMS:  See HPI for details.  Comprehensive greater than 10-point review of systems is otherwise negative besides that detailed above.      PHYSICAL EXAMINATION:   VITAL SIGNS:  Blood pressure is currently 180/90 with a heart rate of 75.  Afebrile.  saturation 98% on room air.   GENERAL:  The patient has some conversational dyspnea present.  He is a good historian.  Does not appear toxic.   HEENT:  Head is atraumatic.  Sclerae are white.  Eyelids are normal.  Conjunctivae are normal.  Extraocular movements are intact.   NECK:  Supple.  No cervical or supraclavicular lymphadenopathy.   HEART:  Regular rate and rhythm.  No significant murmurs.  Trace lower  extremity edema bilaterally.   LUNGS:  Notable for crackles at the bases bilaterally.  Conversational dyspnea is present, mild to moderate.  He is able to complete sentences when speaking to me.  No intercostal retractions.   ABDOMEN:  Nontender, nondistended.  Soft.  No masses.  No organomegaly.   EXTREMITIES:  Show edema as above.   SKIN:  Reveals no rash, no jaundice.  Skin is dry to touch.   NEUROLOGIC:  Cranial nerves II-XII appear to be intact.  Moves all extremities appropriately.  Sensation intact to light touch in the upper and lower extremities bilaterally.   PSYCHIATRIC:  The patient is awake, alert and oriented x 3.  Mood and affect are normal and appropriate.      LABORATORY AND IMAGING DATA:  Reviewed above in HPI.      EKG:  I personally reviewed, shows a sinus rhythm with heart rate of 80.  T-wave inversion noted in aVL and mild ST depressions noted in I, V5 and V6.      Chest x-ray personally reviewed and shows increased vascular congestion that appears consistent with pulmonary edema by my read.      IMPRESSION AND PLAN:  Mr. Raza is a very pleasant 69-year-old male well known to the Hospitalist Service for multiple previous admissions in the past revolving around chest pain and hypertensive urgency.  He presents to the hospital this evening for concerns about chest pain and shortness of breath.  He appears to have pulmonary edema, which I suspect will improve with dialysis.      1.  Shortness of breath:  Secondary to pulmonary edema.   2.  Chest pain:  I suspect due to work of breathing in the setting of pulmonary edema.   3.  Mildly detectable troponin level:  Appears to be near his normal baseline when he presents to the hospital.  We will check another troponin enzyme.  Would not anticipate further workup unless next troponin levels become significantly elevated.  I suspect his detectable troponin is due to pulmonary edema and work of breathing in the setting of end-stage renal disease.   Most recent stress testing included nuclear medicine stress test performed 2018, showing no evidence of ischemia, and he has a normal ejection fraction on most recent echocardiogram performed 10/2019.   4.  Human immunodeficiency virus:  Well controlled on antiretroviral medications.   5.  End-stage renal disease:  Normally dialyzes Tuesday, Thursday and Saturday.   6.  Diabetes mellitus type 2:  On insulin therapy.      PLAN:   1.  Admit to Inpatient Service.  Anticipate greater than 2 midnights in the hospital.   2.  Nephrology consult for dialysis.  I suspect dialysis will improve all of his symptoms.   3.  If troponin enzyme becomes significantly elevated or chest pain persists after dialysis and fluid removal, would consider further workup at that point, but for now, hold off, as I suspect his symptoms are all due to hypervolemia and pulmonary edema related to dialysis need.   4.  Telemetry monitoring.   5.  Resume home medications when clarified by Pharmacy in the morning.   6.  Dialysis diet.   7.  Insulin sliding scale as needed for hyperglycemia.   8.  Repeat troponin enzyme in the morning.   9.  Inpatient admission.         AMY VERMA MD             D: 2019   T: 2019   MT: GWYN      Name:     GREGORIO BRUSH   MRN:      [id number]        Account:      [id number]   :      1950        Admitted:     2019                   Document: [id number]       cc: Sukh Owen MD

## 2019-11-26 NOTE — PROVIDER NOTIFICATION
"MD paged: \"FYI: Gilbert back @ 0.120. Please advise as needed, thank you!\"    Addendum: MD called writer, ordered trop check @ 1500 today. Will continue to monitor.   "

## 2019-11-26 NOTE — ED NOTES
[facility]  ED Nurse Handoff Report    HPI   Donald Raza is a 69 year old male with a complex past medical history including COPD, hypertension, hyperlipidemia, diabetes, and end stage renal disease on dialysis who presents with shortness of breath. The patient endorses the onset of chest pressure with associated shortness of breath 2-3 hours prior to arrival. He also endorses the sensation that something is stuck in his throat. He describes that yesterday he also felt a chest pressure and took 2 nitroglycerin with relief of his symptoms. He also vomited yesterday but has had no recurrent episodes. The patient denies any fever or cough.  ED Chief complaint: Chest Pain  . ED Diagnosis:   Final diagnoses:   Chest pain, unspecified type   Pulmonary edema cardiac cause (H)   Elevated troponin I level   ESRD on hemodialysis (H)   Anemia of chronic disease   Hypertensive urgency     Allergies:   Allergies   Allergen Reactions     Calcium Acetate Other (See Comments)     Other reaction(s): Other, see comments  Pain in chest and back  Pain in chest area (sensitive skin)      Contrast Dye Other (See Comments)     Contrast nephropathy     Diagnostic X-Ray Materials Other (See Comments)     PN: renal failure     Lisinopril      Sulfa Drugs        Code Status: Full Code  Activity level - Baseline/Home:  Independent. Activity Level - Current:   Stand by Assist. Lift room needed: No. Bariatric: No   Needed: No   Isolation: No. Infection: Not Applicable.     Vital Signs:   Vitals:    11/26/19 0145 11/26/19 0150 11/26/19 0210 11/26/19 0220   BP:   (!) 184/89 (!) 187/90   Resp: 20 21  21   Temp:       TempSrc:       SpO2: 95% 95%  90%   Weight:           Cardiac Rhythm:  ,   Cardiac  Cardiac Rhythm: Normal sinus rhythm  Pain level: 0-10 Pain Scale: 4  Patient confused: No. Patient Falls Risk: Yes.   Elimination Status: Has voided   Patient Report - Initial Complaint: chest pain. Focused Assessment:    02:42 Respiratory Respiratory - Respiratory WDL: -WDL except; breath sounds (pt c/o SOB and difficulty taking a deep breath.) Breath Sounds: All Fields   Head To Toe Assessment - All Lung Fields Breath Sounds: Posterior:; Anterior:; crackles; equal bilaterally; diminished      02:36 Cardiac Cardiac - Cardiac WDL: -WDL except; chest pain (pt c/o chest pain x3 hours PTA that started when he was watching TV)  Review of Systems (Cardiac) - Cardiovascular Symptoms/Conditions: chest pain; coronary artery disease  Treatment/Device/Implant: pacemaker   Chest Pain Assessment - Rating (0-10): 6  Chest Pain Location: midsternal; shoulder, right  Chest Pain Radiation: shoulder  Precipitating Factors: at rest (while watching TV.) Associated Signs/Symptoms: hypertension; nausea; pallor   Cardiac Monitoring - EKG Monitoring: Yes  Cardiac Regularity: Regular  Cardiac Rhythm: NSR   Chest Pain Assessment - Character: squeezing; aching   Manage Acute Chest Pain - Chest Pain Intervention: cardiac biomarkers drawn; cardiac monitor placed; 12-lead ECG obtained; aspirin given; nitroglycerin SL given      Tests Performed: blood work, EKG, xray. Abnormal Results:   Labs Ordered and Resulted from Time of ED Arrival Up to the Time of Departure from the ED   CBC WITH PLATELETS DIFFERENTIAL - Abnormal; Notable for the following components:       Result Value    RBC Count 2.94 (*)     Hemoglobin 8.2 (*)     Hematocrit 26.5 (*)     MCHC 30.9 (*)     RDW 16.5 (*)     All other components within normal limits   BASIC METABOLIC PANEL - Abnormal; Notable for the following components:    Sodium 132 (*)     Potassium 3.3 (*)     Chloride 93 (*)     Glucose 280 (*)     Urea Nitrogen 87 (*)     Creatinine 8.79 (*)     GFR Estimate 5 (*)     GFR Estimate If Black 6 (*)     All other components within normal limits   TROPONIN I - Abnormal; Notable for the following components:    Troponin I ES 0.077 (*)     All other components within normal limits   NT  PROBNP INPATIENT - Abnormal; Notable for the following components:    N-Terminal Pro BNP Inpatient 68,126 (*)     All other components within normal limits   PULSE OXIMETRY NURSING   CARDIAC CONTINUOUS MONITORING   PERIPHERAL IV CATHETER     XR Chest 2 Views   Final Result   IMPRESSION: Mild increased pulmonary vascularity and mildly increased interstitial opacities, suggestive of interstitial pulmonary edema.         .   Treatments provided: nitroglycerin SL x2, nitroglycerin patch, aspirin  Family Comments: brother marichuy at bedside  OBS brochure/video discussed/provided to patient:  N/A  ED Medications:   Medications   lidocaine 1 % 0.1-1 mL (has no administration in time range)   lidocaine (LMX4) cream (has no administration in time range)   sodium chloride (PF) 0.9% PF flush 3 mL (has no administration in time range)   sodium chloride (PF) 0.9% PF flush 3 mL (has no administration in time range)   nitroGLYcerin (NITROSTAT) sublingual tablet 0.4 mg (0.4 mg Sublingual Given 11/26/19 0213)   nitroGLYcerin (NITRODUR) 0.4 MG/HR 24 hr patch 1 patch (has no administration in time range)   aspirin (ASA) tablet 325 mg (325 mg Oral Given 11/26/19 0151)     Drips infusing:  No  For the majority of the shift, the patient's behavior Green. Interventions performed were frequent rounding.     Severe Sepsis OR Septic Shock Diagnosis Present: No      ED Nurse Name/Phone Number: Marissa King RN,   2:43 AM    RECEIVING UNIT ED HANDOFF REVIEW    Above ED Nurse Handoff Report was reviewed: Yes  Reviewed by: Keren Jackson on November 26, 2019 at 3:27 AM

## 2019-11-26 NOTE — PLAN OF CARE
A&O X4. VSS on 1L NC; BP elevated. Tele. SR w/ 1st degree AV block. SBA. Dialysis diet. PIV SL. L Fistula; WDL. Creat 8.79. LS dimin. R side, crackles LLL.BNP 68,000.  Denies CP, SOB, N/V. FRANCO. Trop 0.077. Nitro patch in place.  Hemoglobin 8.2. K 3.3. ; sliding scale coverage given. Nephrology consulted. Continue to monitor.

## 2019-11-26 NOTE — PROGRESS NOTES
[facility]  Patient is currently open to home care services with [facility]. The patient is currently receiving RN, PT, OT, and SW services. [facility]  and team have been notified of patient admission. [facility] liaison will continue to follow patient during stay. If appropriate provide orders to resume home care at time of discharge.

## 2019-11-27 NOTE — PROGRESS NOTES
Vascular Access consult ordered for PICC placement on this patient. After reviewing patient chart it is noted that he is a nephrology patient and has a fistula in the left arm to receive dialysis 3 times per week. Text paged and called hospitalist without the ability to express concerns. Unable to place picc line without Nephrology approval. Waited for 40min but unable to achieve the clearance needed to place the picc line. Spoke with charge nurse in regards to the situation. Pt has working PIV and a lack of necessity for a central line. Charge RN will call PICC STAT back for line placement if needed. Charge for a visit only.

## 2019-11-27 NOTE — PROGRESS NOTES
"Care Coordination:  Met with pt to verify services and discharge plan of care. Pt was admitted with shortness of breath and chest pain. Pt is known to CTS services from previous admits. Met with pt at bedside to discuss plan of care. He states he called into the clinic and they advised him to go to the ER and he was admitted. He had a RRT last evening due to AMS and weakness. He states \"they get worried when I am sleepy and can't wake up\". He says this is common on dialysis days. He is very sleepy and tired on dialysis days and often has a hard time waking up and communicating on these days. He verifies that he lives at home with his wife and brother. He continues to have home care services through [facility] for RN/PT/OT/SW. These services will need to be resumed on discharge. He goes to HD on T[facility] at [facility]. His wife and brother are supportive and assist him with transport to and from appts and HD. One of them will be available for transportation on day of discharge. He has been set up with PCP follow up appt on Wed 12/4 at 11:30 with Rico Ramos. He denies questions or concerns with discharge back home with resumption of home care on discharge. He anticipates discharging tomorrow.    Karen Botello RN BSN CM  Inpatient Care Coordination  [facility]  [phone]    "

## 2019-11-27 NOTE — PLAN OF CARE
Please see flowsheets for detailed vital signs and assessments.   Neuro: A&Ox4  Vital Signs: see VS flowsheet  Pain: denies-denies chest pain  O2: RA-FRANCO  Tele: SR with occasional paced bts, PVC's, ST depression, 1st degree AVB.  Respiratory: LS clear/dim, FRANCO  GI: BM during the night-loose,green,black  : WNL, HD  Skin: bruising, scabs  Activity: Assist x1 with walker  IVF: Heparin gtt infusing at 950 units/hr this AM-bolus of 4000 units, dose increased to 1250 units/hr. Next Hep10a at 18:00 today.  Notable labs: Crt 6.94, Hgb 8.2, plt 146, K+ 4.4.  Protocols: NA  Consults: cardiology, cardiac rehab, nephrology  Plan: BG monitoring. Telemetry monitoring. Continue Heparin gtt. HD tues, thurs, sat. Continue current POC. See MD and consult notes for detailed POC.  Discharge: to be  determined

## 2019-11-27 NOTE — PLAN OF CARE
Discharge Planner OT   Patient plan for discharge: Home today     Current status: OT/CR- orders received, chart reviewed, discussed with RN and discussed with patient. Pt reported no concerns for I/ADLs and denied needs for OT/CR. Pt reported that he will have assist from family as needed. Rn reported that patient will likely discharge today.  Will cancel/complete IP OT/CR orders/evaluation.     Barriers to return to prior living situation: Defer to medical team     Recommendations for discharge: Defer to medical team    Rationale for recommendations: Will cancel/complete IP OT/CR orders/evaluation, per pt request.        Entered by: Dmtiri Ruiz 11/27/2019 2:24 PM

## 2019-11-27 NOTE — DISCHARGE SUMMARY
[facility]  Hospitalist Discharge Summary       Date of Admission:  11/26/2019  Date of Discharge:  11/27/2019  Discharging Provider: Asif Gerard MD      Discharge Diagnoses   Volume overload secondary to ESRD    Follow-ups Needed After Discharge   Follow-up Appointments     Follow-up and recommended labs and tests       Follow up with dialysis provider as usual.             Discharge Disposition   Discharged to home  Condition at discharge: Stable    Hospital Course   Patient is a 69-year-old male with a history of ESRD on T-Th-Sat dialysis, DM 2, HTN, anemia, HIV on retroviral, who was admitted to the hospital service for chest pain and shortness of breath.  Evaluation on admission was consistent with pulmonary edema secondary to volume overload, based upon chest x-ray showing increased pulmonary vascularity.  He was not needing any oxygen.  Troponin was trended and increased mildly, not consistent with acute coronary syndrome.  nephrology was consulted and performed dialysis with improvement in the patient's symptoms.  Few hours after dialysis, the patient had an episode of alteration in mental status.  EKG was checked and had ST elevation in 2 leads inferiorly.  Troponin had already come down by that time.  There was concern for acute coronary syndrome and he was started on heparin.  He was evaluated the next day and did have any chest pain.  Mental status is back to baseline.  He says he has alterations in mental status typically associated with dialysis and this was nothing new for him.  He was appropriate for discharge home to continue his normal dialysis schedule.     Problems addressed during hospital stay    Patient had elevated troponin which was secondary to demand ischemia and therefore a type II MI.  Patient had acute encephalopathy which was toxic/metabolic in nature due to missing dialysis   Patient had acute hypoxic respiratory failure secondary to acute pulmonary edema due  to ESRD    Consultations This Hospital Stay   NEPHROLOGY IP CONSULT  CARE COORDINATOR IP CONSULT  CARDIOLOGY IP CONSULT  CARDIAC REHAB IP CONSULT  PHARMACY TO DOSE HEPARIN  VASCULAR ACCESS ADULT IP CONSULT  PHARMACY TO DOSE HEPARIN  SMOKING CESSATION PROGRAM IP CONSULT    Code Status   Full Code    Time Spent on this Encounter   I, Asif Gerard MD, personally saw the patient today and spent greater than 30 minutes discharging this patient.       Asif Gerard MD  [facility]  ______________________________________________________________________    Physical Exam   Vital Signs: Temp: 97.8  F (36.6  C) Temp src: Axillary BP: (!) 159/72 Pulse: 59 Heart Rate: 75 Resp: 18 SpO2: 91 % O2 Device: None (Room air) Oxygen Delivery: 1 LPM  Weight: 190 lbs 11.2 oz    General: Laying in bed, no distress.    HEENT: No scleral icterus. Oropharynx moist.     Neck: Supple. Normal range of motion.     Pulmonary: Normal work of breathing. Clear to auscultation bilaterally.    Cardiovascular: Regular rate and rhythm without murmur or extra heart sounds. Thrill from LUE fistula transmitted to heart tones.     Abdomen: Soft and non-tender.    Extremities: No peripheral edema. No clubbing.     Neurologic: Awake, alert, appropriate.    Skin: Warm and dry.    Psychiatric: Normal affect and mood.           Primary Care Physician   Sukh Owen    Discharge Orders      Medication Therapy Management Referral      Home Care PT Referral for Hospital Discharge      Home Care OT Referral for Hospital Discharge      Home Care Social Service Referral for Hospital Discharge      Home care nursing referral      Reason for your hospital stay    You were admitted with shortness of breath and chest pain which we think was due to needing dialysis. These symptoms improved after dialysis. There was some concern you were having a heart attack, but it looks more like strain on the heart from the increased fluid. You can go  back home on your same medications and same dialysis schedule.     Follow-up and recommended labs and tests     Follow up with dialysis provider as usual.     Activity    Your activity upon discharge: activity as tolerated     MD face to face encounter    Documentation of Face to Face and Certification for Home Health Services    I certify that patient: Donald Raza is under my care and that I, or a nurse practitioner or physician's assistant working with me, had a face-to-face encounter that meets the physician face-to-face encounter requirements with this patient on: November 27, 2019.    This encounter with the patient was in whole, or in part, for the following medical condition, which is the primary reason for home health care: ESRD.    I certify that, based on my findings, the following services are medically necessary home health services: Nursing, Occupational Therapy, Physical Therapy and Social Work.    My clinical findings support the need for the above services because: Nurse is needed: To teach and train about the disease and treatments for ESRD.    Further, I certify that my clinical findings support that this patient is homebound (i.e. absences from home require considerable and taxing effort and are for medical reasons or [facility] services or infrequently or of short duration when for other reasons) because: Leaving home is medically contraindicated for the following reason(s): Dyspnea on exertion that makes it so they cannot leave their home for needed services without clinical deterioration...    Based on the above findings. I certify that this patient is confined to the home and needs intermittent skilled nursing care, physical therapy and/or speech therapy.  The patient is under my care, and I have initiated the establishment of the plan of care.  This patient will be followed by a physician who will periodically review the plan of care.  Physician/Provider to provide follow up care: Brayden  Sukh SORENSEN    Attending hospital physician [facility] Asif Gerard MD  Physician Signature: See electronic signature associated with these discharge orders.  Date: 11/27/2019     Diet    Follow this diet upon discharge:  Dialysis diet.       Significant Results and Procedures     CMP  Recent Labs   Lab 11/27/19  0648 11/26/19 2213 11/26/19  0146    135 132*   POTASSIUM 4.4 4.2 3.3*   CHLORIDE 100 98 93*   CO2 26 30 27   ANIONGAP 10 7 12   * 156* 280*   BUN 54* 46* 87*   CR 6.94* 6.08* 8.79*   GFRESTIMATED 7* 8* 5*   GFRESTBLACK 8* 10* 6*   PRABHA 9.2 10.0 9.5   MAG  --  2.2  --    PHOS  --  4.1  --    PROTTOTAL  --  8.0  --    ALBUMIN  --  3.8  --    BILITOTAL  --  0.6  --    ALKPHOS  --  104  --    AST  --  15  --    ALT  --  16  --      CBC  Recent Labs   Lab 11/27/19  0648 11/26/19  2336 11/26/19 2213 11/26/19  0146   WBC 6.3 6.7 6.9 6.3   RBC 3.01* 2.92* 2.97* 2.94*   HGB 8.2* 8.1* 8.1* 8.2*   HCT 28.0* 26.9* 27.7* 26.5*   MCV 93 92 93 90   MCH 27.2 27.7 27.3 27.9   MCHC 29.3* 30.1* 29.2* 30.9*   RDW 16.8* 17.1* 16.9* 16.5*   * 146* 148* 160     INRNo lab results found in last 7 days.  Arterial Blood GasNo lab results found in last 7 days.     Discharge Medications   Current Discharge Medication List      CONTINUE these medications which have NOT CHANGED    Details   abacavir (ZIAGEN) 300 MG tablet Take 600 mg by mouth every evening       albuterol (PROAIR HFA/PROVENTIL HFA/VENTOLIN HFA) 108 (90 BASE) MCG/ACT Inhaler Inhale 2 puffs into the lungs every 6 hours as needed for shortness of breath / dyspnea or wheezing  Qty: 1 Inhaler, Refills: 1    Associated Diagnoses: Cough      aspirin 81 MG EC tablet Take 81 mg by mouth every evening      atorvastatin (LIPITOR) 40 MG tablet Take 40 mg by mouth At Bedtime      B COMPLEX-C-FOLIC ACID PO Take 1 tablet by mouth At Bedtime       carvedilol (COREG) 12.5 MG tablet Take 1 tablet (12.5 mg) by mouth 2 times daily  (with meals)  Qty: 60 tablet, Refills: 1    Associated Diagnoses: Hypertensive emergency      chlorhexidine (CHLORHEXIDINE) 0.12 % solution Rinse and gargle 15 ml by mouth twice a day as directed.      cinacalcet (SENSIPAR) 30 MG tablet Take 30 mg by mouth three times a week On dialysis days      CLONAZEPAM PO Take 0.5 mg by mouth nightly as needed for anxiety (restless legs)      cloNIDine (CATAPRES) 0.1 MG tablet Take 0.1 mg by mouth daily as needed (for high blood pressure)       dapsone 100 MG tablet Take 100 mg by mouth At Bedtime       Darunavir Ethanolate (PREZISTA PO) Take 800 mg by mouth At Bedtime.      dolutegravir (TIVICAY) 50 MG tablet Take 50 mg by mouth At Bedtime      DOXERCALCIFEROL IV Inject 1 mcg into the vein three times a week (with dialysis); per [facility] dialysis records 10/15/19      epoetin brittni (EPOGEN,PROCRIT) 04814 UNIT/ML injection Inject 15,000 Units Subcutaneous three times a week WITH DIALYSIS; per [facility] dialysis records 10/15/19      !! gabapentin (NEURONTIN) 300 MG capsule Take 300 mg by mouth as needed May take after HD.      !! gabapentin (NEURONTIN) 300 MG capsule Take 300 mg by mouth 2 times daily       guaiFENesin (MUCINEX) 600 MG 12 hr tablet Take 1,200 mg by mouth 2 times daily as needed       hypromellose-dextran (ARTIFICAL TEARS) 0.1-0.3 % ophthalmic solution Place 1 drop into both eyes daily as needed for dry eyes       imiquimod (ALDARA) 5 % cream Apply topically as needed      insulin glargine (LANTUS PEN) 100 UNIT/ML pen Inject 9 Units Subcutaneous 2 times daily  Qty: 3 mL, Refills: 3    Comments: If Lantus is not covered by insurance, may substitute Basaglar at same dose and frequency.    Associated Diagnoses: Hypertensive emergency      insulin lispro (HUMALOG PENFILL) 100 UNIT/ML Cartridge Inject 2 Units Subcutaneous 3 times daily (before meals)  Qty: 3 mL, Refills: 3    Associated Diagnoses: Type 2 diabetes mellitus with hyperosmolarity without coma, with long-term  "current use of insulin (H)      insulin lispro (HUMALOG VIAL) 100 UNIT/ML vial Inject Subcutaneous 3 times daily (before meals) Takes 2 units for every 50 BG above 150.  Qty: 3 mL, Refills: 3    Associated Diagnoses: Type 2 diabetes mellitus with hyperosmolarity without coma, with long-term current use of insulin (H)      ipratropium - albuterol 0.5 mg/2.5 mg/3 mL (DUONEB) 0.5-2.5 (3) MG/3ML neb solution Take 1 vial (3 mLs) by nebulization every 6 hours as needed for shortness of breath / dyspnea or wheezing  Qty: 90 vial, Refills: 1    Associated Diagnoses: Acute respiratory failure with hypoxia (H)      irbesartan (AVAPRO) 300 MG tablet Take 1 tablet (300 mg) by mouth At Bedtime  Qty: 30 tablet, Refills: 0    Associated Diagnoses: Hypertension secondary to other renal disorders      Isosorbide Mononitrate  MG TB24 Take 1 tablet (120 mg) by mouth every evening  Qty: 30 tablet, Refills: 11    Associated Diagnoses: Coronary artery disease involving native heart, angina presence unspecified, unspecified vessel or lesion type; Hypertension secondary to other renal disorders      ketoconazole (NIZORAL) 2 % cream Apply topically daily Between toes for fungal infection      MAGNESIUM OXIDE PO Take 400 mg by mouth At Bedtime      melatonin 5 MG tablet Take 5 mg by mouth nightly as needed       mirtazapine (REMERON) 15 MG tablet Take 15 mg by mouth At Bedtime      nitroglycerin (NITROSTAT) 0.4 MG SL tablet One tablet under the tongue every 5 minutes if needed for chest pain. May repeat every 5 minutes for a maximum of 3 doses in 15 minutes\"  Qty: 25 tablet, Refills: 3    Associated Diagnoses: Coronary artery disease due to lipid rich plaque; Status post coronary angioplasty      Nutritional Supplements (NEPRO PO) Take 1 Container by mouth 3 times daily 2-3 times daily      omega-3 acid ethyl esters (LOVAZA) 1 G capsule Take 2 g by mouth 2 times daily      pantoprazole (PROTONIX) 40 MG EC tablet Take 40 mg by mouth " daily      polyethylene glycol (MIRALAX/GLYCOLAX) packet Take 1 packet by mouth daily as needed for constipation      ritonavir (NORVIR) 100 MG capsule Take 1 capsule by mouth At Bedtime       sucroferric oxyhydroxide (VELPHORO) 500 MG CHEW chewable tablet Take 1,000 mg by mouth 2 times daily       umeclidinium (INCRUSE ELLIPTA) 62.5 MCG/INH inhaler Inhale 1 puff into the lungs daily       !! - Potential duplicate medications found. Please discuss with provider.        Allergies   Allergies   Allergen Reactions     Calcium Acetate Other (See Comments)     Other reaction(s): Other, see comments  Pain in chest and back  Pain in chest area (sensitive skin)      Contrast Dye Other (See Comments)     Contrast nephropathy     Diagnostic X-Ray Materials Other (See Comments)     PN: renal failure     Lisinopril      Sulfa Drugs

## 2019-11-27 NOTE — PLAN OF CARE
A&O X4. VSS on RA; weaned from 4L; BP elevated. Tele. SR w/ 1st degree AV block and demand A-pacing. SBA w/ walker. Dialysis diet. Heparin gtt @ 9.5mL/hr; bolus given. L Fistula; WDL. Creat trending down. LS dimin. R side, crackles LLL. Denies CP, SOB, N/V. FRANCO. Trop 0.126. Hemoglobin 8.1. . Nephrology following; cardiology consulted. Discharge TBD. Continue to monitor.

## 2019-11-27 NOTE — PLAN OF CARE
Patient's After Visit Summary was reviewed with patient and/or spouse.   Patient verbalized understanding of After Visit Summary, recommended follow up and was given an opportunity to ask questions.   Discharge medications sent home with patient/family: No.. no new meds  Discharged with spouse    Belongings taken with pt. Pt wheeled to front lobby by volunteer. Wife driving pt home.    /69   Pulse 71   Temp 97.6  F (36.4  C) (Oral)   Resp 16   Wt 86.5 kg (190 lb 11.2 oz)   SpO2 91%   BMI 26.60 kg/m

## 2019-11-27 NOTE — PROGRESS NOTES
"SPIRITUAL HEALTH SERVICES Progress Note  [facility]    [facility] follow up visit with pt per routine hospital  admission request.  Pt is known to me from previous admission.  Reflected about love of God and family.  Pt it Orthodox.    Tomorrow is Thanksgiving.  Donald is not concerned about missing the holiday, he states he accepts \"whatever the Lord has planned\".  He shared he is awaiting more information about his heart concern today and resting after dialysis yesterday.  Donald states \"I am not afraid to die.  I will wait for when it is my time but I am not afraid\".    Affirmed his maureen and courage. Pt welcomed prayer for healing and comfort.    Plan: Spiritual Health Services remains available for additional emotional/spiritual support.    Delonte Quevedo MA  Staff   Pager: [phone]  Phone: [phone]         "

## 2019-11-27 NOTE — DISCHARGE INSTRUCTIONS
You have been scheduled a follow up appt with Rico Ramos at [facility] in [place] on Wed 12/4 at 113

## 2019-11-27 NOTE — PROGRESS NOTES
Renal Medicine       Comfortable  Room air sats 91 %    At outpatient target      Plan am dialysis  Continued UF    Dialysis orders entered         Recent Labs   Lab 11/27/19  0648      POTASSIUM 4.4   CHLORIDE 100   CO2 26   ANIONGAP 10   *   BUN 54*   CR 6.94*   GFRESTIMATED 7*   GFRESTBLACK 8*   PRABHA 9.2           JAMAL Fuller    [facility]  [phone]

## 2019-11-27 NOTE — PLAN OF CARE
Discussed with Dr Gerard and Simin RN, pt does not need PICC or midline placement at this time. Order will be discontinued.

## 2019-12-03 NOTE — ED PROVIDER NOTES
History     Chief Complaint:  Shortness of Breath     KELSEY Raza is a 69 year old male with history of COPD and CKD who presents to the emergency department today with shortness of breath. The patient states that he has felt short of breath, chest pressure and chills since [id number] this morning. He states he felt the shortness of breath coming on last night. He states he ate some dinner last night but felt he was unable to digest it properly and has been vomiting. He states he does not make urine and is a dialysis patient, last run 3 days ago and has not missed any. The patient states that he dialyzes at [facility] on Tuesdays, Thursdays and Saturdays. He states he was suppose to have a run this morning but did not go. He states he has not gone to the bathroom for a few days. He denies fever, weight change. He states he was just discharged from admission 11/26-11/27.    Allergies:  Calcium acetate  Contrast dye  Diagnostic xray materials  Lisinopril  Sulfa drugs       Medications:    Ziagen  Albuterol  Aspirin  Lipitor  Coreg  Clonazepam  Sensipar  Catapres  Dapson  Prezista  Tivicay  Doxercalciferol  Epogen  Neurontin  Lantus  Humalog  Duoneb  Abapro  Magnesium oxide  Remeron  Nitrostat  Nepro  Lovaza  Protonix  Norvir  Velphoro  Incruse Ellipta     Past Medical History:    Allergic rhinitis  Anemia due to chronic kidney disease  Coronary artery disease  Cataract  Chronic kidney disease  Colon cancer  Chronic obstructive pulmonary disease  Depression  Dilated aortic root  Diverticulitis  End stage renal disease on dialysis  Gout  Human immunodeficiency virus disease  Squamous cell carcinoma  Hypertension   Impotence of organic origin  Elevated prostate specific antigen velocity  Hyperlipidemia  Pulmonary hypertension  Transient ischemic attack  Type II diabetes     Past Surgical History:    Angiogram x2  Appendectomy  Laparoscopic cholecystectomy  Colostomy  EP pacemaker  Left heart catheterization  Heart  catheterization, angioplasty  Dialysis fistulogram left  Coronary stent x2     Family History:    Heart Disease  Kidney Disease  HTN    Social History:  The patient was accompanied to the ED by his daughter.  Smoking Status: Former  Smokeless Tobacco: Never  Alcohol Use: No   Drug Use: No   PCP: Sukh Owen   Marital Status:      Review of Systems   Constitutional: Positive for chills. Negative for fever and unexpected weight change.   Respiratory: Positive for chest tightness and shortness of breath.    Gastrointestinal: Positive for constipation, nausea and vomiting.   All other systems reviewed and are negative.       Physical Exam     Patient Vitals for the past 24 hrs:   BP Temp Temp src Pulse Heart Rate Resp SpO2 Weight   12/03/19 1240 -- -- -- -- 60 26 -- --   12/03/19 1235 106/83 -- -- 60 60 -- -- --   12/03/19 1230 113/77 -- -- 60 60 8 100 % --   12/03/19 1225 114/80 -- -- 60 60 16 100 % --   12/03/19 1220 114/68 -- -- 60 60 9 100 % --   12/03/19 1215 98/58 -- -- 60 60 26 100 % --   12/03/19 1210 95/60 -- -- 60 60 26 98 % --   12/03/19 1205 120/67 -- -- 60 60 26 97 % --   12/03/19 1200 117/82 -- -- 71 60 24 97 % --   12/03/19 1155 (!) 74/51 -- -- 60 60 15 97 % --   12/03/19 1150 136/69 -- -- 60 65 8 (!) 84 % --   12/03/19 1145 97/57 -- -- 64 60 11 97 % --   12/03/19 1140 (!) 82/60 -- -- 60 60 12 97 % --   12/03/19 1135 99/68 -- -- 71 73 13 94 % 82.1 kg (181 lb)   12/03/19 1130 (!) 72/49 -- -- 60 60 (!) 5 95 % --   12/03/19 1125 94/51 -- -- 60 60 16 96 % --   12/03/19 1120 (!) 154/70 -- -- 71 85 (!) 0 97 % --   12/03/19 1115 (!) 54/37 -- -- 60 60 13 99 % --   12/03/19 1110 (!) 63/41 -- -- 60 60 21 93 % --   12/03/19 1105 (!) 74/49 -- -- 60 60 22 99 % --   12/03/19 1100 -- -- -- 60 60 20 99 % --   12/03/19 1045 119/63 -- -- 66 65 19 99 % --   12/03/19 1040 127/60 -- -- 71 67 17 99 % --   12/03/19 1035 110/60 -- -- 66 71 21 100 % --   12/03/19 1030 121/62 -- -- 71 68 17 100 % --   12/03/19 1025  109/58 -- -- 66 67 17 99 % --   12/03/19 1019 -- -- -- -- -- -- 98 % --   12/03/19 1005 105/56 -- -- -- 71 26 99 % --   12/03/19 1000 103/58 -- -- 72 71 26 99 % --   12/03/19 0955 105/56 -- -- 71 72 21 98 % --   12/03/19 0950 106/57 -- -- 73 75 20 98 % --   12/03/19 0945 109/67 -- -- 81 82 21 95 % --   12/03/19 0940 113/63 -- -- 72 73 19 97 % --   12/03/19 0935 116/60 -- -- 73 71 19 97 % --   12/03/19 0930 131/69 -- -- 76 77 16 96 % --   12/03/19 0925 (!) 157/84 -- -- 84 87 26 92 % --   12/03/19 0920 129/78 -- -- 90 89 29 91 % --   12/03/19 0915 134/82 -- -- 93 96 19 91 % --   12/03/19 0910 123/70 -- -- 86 86 28 (!) 89 % --   12/03/19 0905 119/79 -- -- 89 90 18 (!) 88 % --   12/03/19 0900 (!) 165/84 -- -- 93 95 20 93 % --   12/03/19 0855 115/72 -- -- 92 94 19 93 % --   12/03/19 0850 105/60 -- -- 95 93 29 93 % --   12/03/19 0845 128/87 -- -- 100 98 28 92 % --   12/03/19 0840 124/86 -- -- 98 99 20 90 % --   12/03/19 0835 (!) 128/92 -- -- 98 101 26 (!) 88 % --   12/03/19 0830 (!) 124/93 -- -- 103 98 11 91 % --   12/03/19 0825 -- -- -- 103 101 26 92 % --   12/03/19 0820 -- -- -- -- 200 20 (!) 89 % --   12/03/19 0815 (!) 135/110 -- -- 101 101 27 92 % --   12/03/19 0810 -- -- -- 91 100 26 92 % --   12/03/19 0805 -- -- -- -- 98 25 95 % --   12/03/19 0800 -- -- -- 98 98 19 90 % --   12/03/19 0755 123/85 -- -- 99 97 25 94 % --   12/03/19 0750 127/89 -- -- 97 96 28 94 % --   12/03/19 0745 (!) 132/96 -- -- 97 94 (!) 31 94 % --   12/03/19 0742 (!) 147/98 -- -- -- 97 18 94 % --   12/03/19 0740 -- -- -- -- -- -- 93 % --   12/03/19 0730 (!) 123/103 -- -- 94 -- -- 96 % --   12/03/19 0729 -- -- -- -- -- -- 90 % --   12/03/19 0727 130/87 96.5  F (35.8  C) Axillary -- 88 28 (!) 88 % --        Physical Exam   General: Alert, in distress 2/2 shortness of breath  HEENT:  Moist mucous membranes. Conjunctiva normal.   CV:  RRR; 3/6 SYS murmur noted. palpable thrill left fistula  Pulm:  Coarse ronchi throughout both lung fields. Sitting  upright in bed with intercostal retractions  GI:  Soft, nontender, nondistended.  No rebound or guarding.  Normal bowel sounds  MSK:  Moving all extremities.  No focal areas of edema, erythema, or tenderness  Skin:  WWP, no rashes, no lower extremity edema, skin color normal; diaphoretic  Psych:  Well-appearing, normal affect, regular speech    Emergency Department Course     ECst  Indication: shortness of breath   Time: 0731  Vent. Rate 98 bpm. DC interval 136. QRS duration 174. QT/QTc 440/561. P-R-T axis * -20 141.  Normal sinus rhythm. Left bundle branch block. Abnormal ECG.   Read time: 0731    2nd  Indication: shortness of breath  Time: 0930  Vent. Rate 76 bpm. DC interval 224. QRS duration 186. QT/QTc 478/537. P-R-T axis 41 31 153.  Sinus rhythm with 1st degree AV block. Left bundle branch block. Abnormal ECG.   Read time: 0933    3rd  Indication: shortness of breath  Time: 1042  Vent. Rate 68 bpm. DC interval 232. QRS duration 110. QT/QTc 428/455. P-R-T axis 42 61 146.  Sinus rhythm with 1st degree AV block. Left ventricular hypertrophy with repolarizations abnormality. Abnormal ECG.  Read time: 1046    Imaging:  Radiology results were communicated with the patient who voiced understanding of the findings.    1st  XR Chest 1 view, portable:   IMPRESSION: Interstitial and bilateral groundglass opacities with  perihilar predominance likely related to CHF and pulmonary edema. The  heart appears enlarged. There is a right-sided pacer device. No  pneumothorax or pleural effusion, as per radiology.    2nd  XR Chest 1 view, portable:   IMPRESSION: Tip of the endotracheal tube is 2.8 cm above the jerry.  There are worsening perihilar opacities with interstitial thickening,  compatible with CHF. No pneumothorax or pleural effusion, as per radiology.    3rd  XR Chest 1 view, portable:   IMPRESSION: Tip of the endotracheal tube is 3.5 cm above the jerry.  There is a right internal jugular central line with the  tip projected  over the mid SVC. Persistent bilateral interstitial and groundglass  opacities. No pneumothorax or pleural effusion. Heart size stable.  Right-sided pacer device noted, as per radiology.    Laboratory:  Laboratory results were communicated with the patient who voiced understanding of the findings.    ISTAT VBG: pH 7.17 (L) / PCO2 64 (H) / PO2 29 / Bicarb 23 / O2 sat 39 / lactic acid 5.5 (H)    VBG: pH 7.19 (L) / PCO2 63 (H) / PO2 30 / Bicarb 24 / FIO2 95 / Oxyhemoglobin: 38 / Base Deficit 4.6    ISTAT Basic Met: Glucose: 346 (H), NA: 131 (L), BUN: 97 (H), Creatinine: 10.5 (H), GFR: 5 (L), HGB: 11.6 (L), Hematocrit: 34 (L), o/w WNL      CBC: WBC: 22.5 (H), HGB: 10.3 (L), PLT: 340  BMP: Glucose: 348 (H), NA: 131 (L), Chloride: 91 (L), Anion Gap: 18 (H), BUN: 91 (H), Creatinine: 9.49 (H), GFR: 5 (L), Calcium: 10.3 (H), o/w WNL    Magnesium: 2.9 (H)  BNP: 37547  0825 Troponin: 0.143  0820 Glucose: 296 (H)  Blood cultures: pending x2    Procedures:  [facility]    -Intubation  Date/Time: 12/3/2019 8:48 AM  Performed by: Jose Palencia MD  Authorized by: Jose Palencia MD       PRE-PROCEDURE DETAILS     Patient status:  Awake    Mallampati score:  II    Pretreatment medications:  None    Paralytics:  Succinylcholine      PROCEDURE DETAILS     Preoxygenation:  BiPAP    Intubation method:  Oral    Oral intubation technique:  Video-assisted    Laryngoscope blade:  Mac 4    Tube size (mm):  7.5    Tube type:  Cuffed    Number of attempts:  1    Ventilation between attempts: no      Cricoid pressure: no      Tube visualized through cords: yes      PLACEMENT ASSESSMENT     ETT to teeth:  23 cm    Tube secured with:  ETT koenig    Breath sounds:  Equal and absent over the epigastrium    Placement verification: chest rise, condensation, direct visualization, equal breath sounds and ETCO2 detector      CXR findings:  ETT in proper place  PROCEDURE   Patient Tolerance:  Patient tolerated  the procedure well with no immediate complications    [facility]    -Central Line  Date/Time: 12/3/2019 11:19 AM  Performed by: Jose Palencia MD  Authorized by: Jose Palencia MD        ANESTHESIA    Anesthesia: Local infiltration  Local Anesthetic:  Lidocaine 1% with epinephrine  Anesthetic Total (mL):  3      PRE-PROCEDURE DETAILS:     Hand hygiene: Hand hygiene performed prior to insertion      Sterile barrier technique: All elements of maximal sterile technique followed      Skin preparation:  ChloraPrep  PROCEDURE DETAILS:     Location:  R internal jugular    Patient position:  Reverse Trendelenburg    Landmarks identified: yes      Ultrasound guidance: yes      Sterile ultrasound techniques: Sterile gel and sterile probe covers were used      Number of attempts:  1    Successful placement: yes      POST PROCEDURE DETAILS:      Post-procedure:  Dressing applied and line sutured    Assessment:  Blood return through all ports, no pneumothorax on x-ray, free fluid flow and placement verified by x-ray  PROCEDURE   Patient Tolerance:  Patient tolerated the procedure well with no immediate complications          Interventions:  0738 Nitrostat 0.4 mg sublingual  0747 Lasix 80 mg IV   0830 Magnesium sulfate 2 g IV  0836 Calcium gluconate 2 g IV  0848 Nitrostat 0.4 mg sublingual  0858 Amidate 20 mg IV  0858 Anectine 100 mg IV  0913 Versed 2 mg IV  0913 Propofol 1000 mg/1000 mL IV  0914 Propofol 15 mcg/kg/min IV  0926  Propofol 20 mcg/kg/min IV  0936 Nitroglycerin 0.07 mcg/kg/min  1103 Versed 2 mg IV  1109 Zosyn 3.375 mg IV  1112  Propofol 15 mcg/kg/min IV  1118 Adrenalin 0.1 mg IV  1127 Versed 2 mg IV  1133 Adrenalin 0.1 mg IV  1145 Adrenalin 0.1 mg IV  1149 Versed 2 mg IV  1159 Levophed 0.03 mcg/kg/min IV  1216 Levophed 0.06 mcg/kg/min IV  1218 Vancomycin 1750 mg IV    Emergency Department Course:  Nursing notes and vitals reviewed. (7:27 AM) I performed an exam of the patient as  documented above.     IV inserted. Blood drawn. This was sent to the lab for further testing, results above.     Medicine administered as documented above.    The patient was sent for XR while in the emergency department, results above.     0827 I rechecked the patient and discussed the results of their workup thus far.     0840 I rechecked the patient.     0848 A intubation procedure was performed as outlined in the procedure note above.  The patient tolerated well and there were no complications.    0906 I rechecked the patient.    0928 I consulted with Dr. Gunderson of the [facility] nephrology services. They are in agreement to accept transfer for the patient.    0945 I consulted with Dr. Rowley, hospitalist, regarding the patient's history and presentation here in the emergency department. They are in agreement to accept the patient.    0958 I consulted with Dr. Simon, [facility] ICU, regarding the patient's history and presentation here in the emergency department.  Recommended broad spectrum antibiotics to start in ER.    1019 I rechecked the patient.    1119 A central line placement was performed as outlined in the procedure note above. The patient tolerated well and there were no complications.    1153 I consulted with Dr. Simon, [facility] ICU, updated them on the ED workup this far.     Findings and plan explained to the Patient. Patient will be transferred to [facility] via EMS. Discussed the case with Dr. Gunderson, who will admit the patient to a monitored bed for further monitoring, evaluation, and treatment.    Impression & Plan      CMS Diagnoses: The Lactic acid level is elevated due to acute respiratory failure and pulmonary edema, at this time there is no sign of severe sepsis or septic shock.    Medical Decision Making:  Donald Raza is a 69 year old male with complex past medical history presents to the ER for evaluation of acute onset shortness of breath.  He has ESRD and dialyzes T/TH/S  with dialysis run supposed to be this morning.  Initially presented in extremis and initially tried BiPAP for concern for acute pulmonary edema given exam findings.  EKG showed new left bundle branch block; no changes concerning for STEMI equivalent based on Sgarbossa criteria.  Checks x-ray confirms pulmonary edema.  No pneumothorax and nothing in history to suggest infectious process such as pneumonia.  Unfortunately, patient continued to decline despite BiPAP, with respiratory acidosis and initial pH 7.1.  Decision was made to intubate and patient family in agreement.  He has a mild elevation of troponin.  He has nonspecific leukocytosis and elevated lactic acid level which I suspect is from his overall clinical presentation of acute pulmonary edema and respiratory failure with hypoxia/hypercapnia.  I have low suspicion for sepsis.  Unfortunately there are no ICU beds available at [facility], and there was delay in transfer as there are no beds available at [facility] or the [facility].  Decision was made to transfer to [facility].  Patient became more hypotensive while in the ER prompting need to start central line and norepinephrine.  Push dose epinephrine given to maintain blood pressures during procedure the patient tolerated this well.  We did not give fluids as the patient's overall clinical picture suggests fluid overload.  I spoke with nephrology of the Health system system in addition to the hospitalist and intensivist were all accepting of the patient for transfer as patient will need emergent dialysis.  Broad-spectrum antibiotic started at the request of intensivist and blood cultures obtained prior to this.  Family updated bedside.  Repeat EKG while in ER showed narrow complex rhythm from initial LBBB seen on initial EKG.  Patient remained hemodynamically stable after norepinephrine was started and was transferred in guarded condition via critical care ambulance.      Total critical care  Emergency physician time in direct patient evaluation and management of this patient, exclusive of procedures: 120 minutes      Diagnosis:    ICD-10-CM    1. Acute respiratory failure with hypercapnia (H) J96.02 Basic metabolic panel (BMP)     CBC + differential     Magnesium     Nt probnp inpatient     Troponin I     Glucose by meter     Glucose by meter     Blood culture     Blood culture     Blood gas venous and oxyhgb   2. Acute pulmonary edema (H) J81.0    3. ESRD (end stage renal disease) on dialysis (H) N18.6     Z99.2    4. LBBB (left bundle branch block) I44.7    5. Lactic acidosis E87.2      Disposition:  Transferred to Dr. Gunderson, [facility].     Scribe Disclosure:  I, Zev Wills, am serving as a scribe at 7:27 AM on 12/3/2019 to document services personally performed by Jose Palencia MD based on my observations and the provider's statements to me.      12/3/2019   [facility] EMERGENCY DEPARTMENT       Jose Palencia MD  12/03/19 3983

## 2019-12-03 NOTE — PROGRESS NOTES
A BiPAP of  12/6 @ 80% was applied to the pt via the mask for an increase in WOB and/or SOB.  The bridge of the nose is intact, but patient is profusely sweating. Patient is still tachypneic with accessory muscle usage. Will continue to monitor and assess the pt's current respiratory status and needs.    Romina Herman, RRT  12/3/2019

## 2019-12-03 NOTE — PROGRESS NOTES
Respiratory Therapy    Patient intubated with a 7.5 ETT initially was 23 at the gums, but was pulled back and is now 23 @ the lip. Placed onto mechanical ventilator settings as follows:  Ventilation Mode: CMV/AC  (Continuous Mandatory Ventilation/ Assist Control)  FiO2 (%): 100 %  Rate Set (breaths/minute): 16 breaths/min  Tidal Volume Set (mL): 500 mL  PEEP (cm H2O): 12 cmH2O  Oxygen Concentration (%): 100 %  Resp: 21    Patient did have a de-sat episode and required to be bagged via ambu bag. Lung sounds coarse with crackles throughout, suctioning out frothy secretions via ETT.        RR increased from 16 to 22 at 1100 due to VBG.    Venous Blood Gas  Recent Labs   Lab 12/03/19  1034 12/03/19  0831   PHV 7.19* 7.17*   PCO2V 63* 64*   PO2V 30 29   HCO3V 24 23   O2PER 95  --        Romina Herman, RRT  12/3/2019

## 2019-12-14 NOTE — PROGRESS NOTES
Dear . [employer],    Medicare Home Health regulations requires [facility] to notify the Physician when the plan for visits has been altered.  We have provided fewer visits than ordered.  We are notifying you of a Missed Visit.    Donald SABINE Raza; MRN [id number]  Missed Visit  Is PT RV  Dates of missed services  11/22/19  Reason: Patient cancelled.   Sincerely [facility]  Grazyna Vernon  [phone]

## 2020-01-01 ENCOUNTER — TELEPHONE (OUTPATIENT)
Dept: CARDIOLOGY | Facility: CLINIC | Age: 70
End: 2020-01-01

## 2020-01-01 ENCOUNTER — HOSPITAL ENCOUNTER (INPATIENT)
Facility: CLINIC | Age: 70
LOS: 2 days | Discharge: HOME | DRG: 306 | End: 2020-04-01
Attending: EMERGENCY MEDICINE | Admitting: INTERNAL MEDICINE
Payer: MEDICARE

## 2020-01-01 ENCOUNTER — HOSPITAL ENCOUNTER (INPATIENT)
Facility: CLINIC | Age: 70
LOS: 2 days | Discharge: HOME | DRG: 280 | End: 2020-04-30
Attending: EMERGENCY MEDICINE | Admitting: INTERNAL MEDICINE
Payer: MEDICARE

## 2020-01-01 ENCOUNTER — HOSPITAL ENCOUNTER (OUTPATIENT)
Facility: CLINIC | Age: 70
Setting detail: OBSERVATION
Discharge: HOME | End: 2020-05-02
Attending: EMERGENCY MEDICINE | Admitting: INTERNAL MEDICINE
Payer: MEDICARE

## 2020-01-01 ENCOUNTER — ANCILLARY PROCEDURE (OUTPATIENT)
Dept: CARDIOLOGY | Facility: CLINIC | Age: 70
End: 2020-01-01
Attending: INTERNAL MEDICINE
Payer: MEDICARE

## 2020-01-01 ENCOUNTER — APPOINTMENT (OUTPATIENT)
Dept: GENERAL RADIOLOGY | Facility: CLINIC | Age: 70
End: 2020-01-01
Attending: EMERGENCY MEDICINE
Payer: MEDICARE

## 2020-01-01 ENCOUNTER — HOSPITAL ENCOUNTER (OUTPATIENT)
Facility: CLINIC | Age: 70
Setting detail: OBSERVATION
Discharge: HOME | End: 2020-03-17
Attending: EMERGENCY MEDICINE | Admitting: HOSPITALIST
Payer: MEDICARE

## 2020-01-01 ENCOUNTER — TRANSFERRED RECORDS (OUTPATIENT)
Dept: HEALTH INFORMATION MANAGEMENT | Facility: CLINIC | Age: 70
End: 2020-01-01

## 2020-01-01 ENCOUNTER — APPOINTMENT (OUTPATIENT)
Dept: CARDIOLOGY | Facility: CLINIC | Age: 70
DRG: 280 | End: 2020-01-01
Attending: INTERNAL MEDICINE
Payer: MEDICARE

## 2020-01-01 ENCOUNTER — VIRTUAL VISIT (OUTPATIENT)
Dept: CARDIOLOGY | Facility: CLINIC | Age: 70
End: 2020-01-01
Payer: MEDICARE

## 2020-01-01 ENCOUNTER — APPOINTMENT (OUTPATIENT)
Dept: GENERAL RADIOLOGY | Facility: CLINIC | Age: 70
DRG: 306 | End: 2020-01-01
Attending: EMERGENCY MEDICINE
Payer: MEDICARE

## 2020-01-01 ENCOUNTER — APPOINTMENT (OUTPATIENT)
Dept: GENERAL RADIOLOGY | Facility: CLINIC | Age: 70
DRG: 280 | End: 2020-01-01
Attending: EMERGENCY MEDICINE
Payer: MEDICARE

## 2020-01-01 ENCOUNTER — HOSPITAL ENCOUNTER (INPATIENT)
Facility: CLINIC | Age: 70
LOS: 3 days | Discharge: HOME HEALTH | DRG: 291 | End: 2020-03-05
Attending: EMERGENCY MEDICINE | Admitting: INTERNAL MEDICINE
Payer: MEDICARE

## 2020-01-01 ENCOUNTER — APPOINTMENT (OUTPATIENT)
Dept: GENERAL RADIOLOGY | Facility: CLINIC | Age: 70
DRG: 291 | End: 2020-01-01
Attending: EMERGENCY MEDICINE
Payer: MEDICARE

## 2020-01-01 ENCOUNTER — APPOINTMENT (OUTPATIENT)
Dept: CT IMAGING | Facility: CLINIC | Age: 70
End: 2020-01-01
Attending: EMERGENCY MEDICINE
Payer: MEDICARE

## 2020-01-01 ENCOUNTER — APPOINTMENT (OUTPATIENT)
Dept: CARDIOLOGY | Facility: CLINIC | Age: 70
DRG: 306 | End: 2020-01-01
Attending: PHYSICIAN ASSISTANT
Payer: MEDICARE

## 2020-01-01 ENCOUNTER — SURGERY (OUTPATIENT)
Age: 70
End: 2020-01-01
Payer: MEDICARE

## 2020-01-01 ENCOUNTER — APPOINTMENT (OUTPATIENT)
Dept: NUCLEAR MEDICINE | Facility: CLINIC | Age: 70
End: 2020-01-01
Attending: HOSPITALIST
Payer: MEDICARE

## 2020-01-01 ENCOUNTER — APPOINTMENT (OUTPATIENT)
Dept: CARDIOLOGY | Facility: CLINIC | Age: 70
DRG: 291 | End: 2020-01-01
Attending: PHYSICIAN ASSISTANT
Payer: MEDICARE

## 2020-01-01 ENCOUNTER — COMMUNICATION - HEALTHEAST (OUTPATIENT)
Dept: ADMINISTRATIVE | Facility: CLINIC | Age: 70
End: 2020-01-01

## 2020-01-01 VITALS
RESPIRATION RATE: 18 BRPM | OXYGEN SATURATION: 92 % | HEART RATE: 74 BPM | WEIGHT: 190.04 LBS | DIASTOLIC BLOOD PRESSURE: 65 MMHG | BODY MASS INDEX: 26.5 KG/M2 | TEMPERATURE: 98.5 F | SYSTOLIC BLOOD PRESSURE: 137 MMHG

## 2020-01-01 VITALS
OXYGEN SATURATION: 96 % | SYSTOLIC BLOOD PRESSURE: 108 MMHG | DIASTOLIC BLOOD PRESSURE: 51 MMHG | BODY MASS INDEX: 27.27 KG/M2 | HEART RATE: 76 BPM | RESPIRATION RATE: 16 BRPM | TEMPERATURE: 95.8 F | WEIGHT: 195.5 LBS

## 2020-01-01 VITALS
HEIGHT: 71 IN | TEMPERATURE: 96.3 F | SYSTOLIC BLOOD PRESSURE: 146 MMHG | RESPIRATION RATE: 20 BRPM | WEIGHT: 191.36 LBS | DIASTOLIC BLOOD PRESSURE: 66 MMHG | HEART RATE: 83 BPM | BODY MASS INDEX: 26.79 KG/M2 | OXYGEN SATURATION: 97 %

## 2020-01-01 VITALS
RESPIRATION RATE: 18 BRPM | DIASTOLIC BLOOD PRESSURE: 57 MMHG | WEIGHT: 199.4 LBS | BODY MASS INDEX: 27.92 KG/M2 | OXYGEN SATURATION: 95 % | HEIGHT: 71 IN | SYSTOLIC BLOOD PRESSURE: 114 MMHG | TEMPERATURE: 95.6 F | HEART RATE: 60 BPM

## 2020-01-01 VITALS
TEMPERATURE: 97.7 F | OXYGEN SATURATION: 98 % | RESPIRATION RATE: 11 BRPM | HEART RATE: 71 BPM | DIASTOLIC BLOOD PRESSURE: 57 MMHG | WEIGHT: 187.39 LBS | SYSTOLIC BLOOD PRESSURE: 80 MMHG | BODY MASS INDEX: 26.14 KG/M2

## 2020-01-01 DIAGNOSIS — I35.0 AORTIC VALVE STENOSIS, ETIOLOGY OF CARDIAC VALVE DISEASE UNSPECIFIED: ICD-10-CM

## 2020-01-01 DIAGNOSIS — I77.810 DILATED AORTIC ROOT (H): ICD-10-CM

## 2020-01-01 DIAGNOSIS — I27.20 PULMONARY HYPERTENSION (H): ICD-10-CM

## 2020-01-01 DIAGNOSIS — N18.6 ESRD (END STAGE RENAL DISEASE) ON DIALYSIS (H): ICD-10-CM

## 2020-01-01 DIAGNOSIS — R79.89 ELEVATED LACTIC ACID LEVEL: ICD-10-CM

## 2020-01-01 DIAGNOSIS — I20.0 UNSTABLE ANGINA (H): ICD-10-CM

## 2020-01-01 DIAGNOSIS — I15.1 HYPERTENSION SECONDARY TO OTHER RENAL DISORDERS: Chronic | ICD-10-CM

## 2020-01-01 DIAGNOSIS — R07.9 ACUTE CHEST PAIN: Primary | ICD-10-CM

## 2020-01-01 DIAGNOSIS — Z95.0 CARDIAC PACEMAKER IN SITU: ICD-10-CM

## 2020-01-01 DIAGNOSIS — Z99.2 DIALYSIS PATIENT (H): ICD-10-CM

## 2020-01-01 DIAGNOSIS — B20 HUMAN IMMUNODEFICIENCY VIRUS (HIV) DISEASE (H): Chronic | ICD-10-CM

## 2020-01-01 DIAGNOSIS — I35.0 NONRHEUMATIC AORTIC VALVE STENOSIS: ICD-10-CM

## 2020-01-01 DIAGNOSIS — R07.9 CHEST PAIN, UNSPECIFIED TYPE: ICD-10-CM

## 2020-01-01 DIAGNOSIS — I16.1 HYPERTENSIVE EMERGENCY: ICD-10-CM

## 2020-01-01 DIAGNOSIS — Z53.9 ERRONEOUS ENCOUNTER--DISREGARD: Primary | ICD-10-CM

## 2020-01-01 DIAGNOSIS — I25.10 CORONARY ARTERY DISEASE INVOLVING NATIVE CORONARY ARTERY OF NATIVE HEART WITHOUT ANGINA PECTORIS: Primary | Chronic | ICD-10-CM

## 2020-01-01 DIAGNOSIS — J96.02 ACUTE RESPIRATORY FAILURE WITH HYPOXIA AND HYPERCARBIA (H): ICD-10-CM

## 2020-01-01 DIAGNOSIS — J96.01 ACUTE RESPIRATORY FAILURE WITH HYPOXIA AND HYPERCARBIA (H): ICD-10-CM

## 2020-01-01 DIAGNOSIS — I35.0 NONRHEUMATIC AORTIC VALVE STENOSIS: Primary | ICD-10-CM

## 2020-01-01 DIAGNOSIS — E87.5 HYPERKALEMIA: ICD-10-CM

## 2020-01-01 DIAGNOSIS — I25.10 CORONARY ARTERY DISEASE INVOLVING NATIVE HEART, ANGINA PRESENCE UNSPECIFIED, UNSPECIFIED VESSEL OR LESION TYPE: ICD-10-CM

## 2020-01-01 DIAGNOSIS — R79.89 ELEVATED TROPONIN: ICD-10-CM

## 2020-01-01 DIAGNOSIS — Z99.2 ESRD (END STAGE RENAL DISEASE) ON DIALYSIS (H): ICD-10-CM

## 2020-01-01 DIAGNOSIS — I20.0 UNSTABLE ANGINA (H): Primary | ICD-10-CM

## 2020-01-01 DIAGNOSIS — I35.0 SEVERE AORTIC STENOSIS: ICD-10-CM

## 2020-01-01 DIAGNOSIS — J81.0 ACUTE PULMONARY EDEMA (H): ICD-10-CM

## 2020-01-01 DIAGNOSIS — N18.6 ESRD (END STAGE RENAL DISEASE) (H): ICD-10-CM

## 2020-01-01 DIAGNOSIS — J96.01 ACUTE RESPIRATORY FAILURE WITH HYPOXIA (H): ICD-10-CM

## 2020-01-01 DIAGNOSIS — R10.31 RLQ ABDOMINAL PAIN: ICD-10-CM

## 2020-01-01 DIAGNOSIS — I25.10 CORONARY ARTERY DISEASE INVOLVING NATIVE CORONARY ARTERY OF NATIVE HEART WITHOUT ANGINA PECTORIS: Chronic | ICD-10-CM

## 2020-01-01 DIAGNOSIS — I15.1 HYPERTENSION SECONDARY TO OTHER RENAL DISORDERS: ICD-10-CM

## 2020-01-01 DIAGNOSIS — I95.89 OTHER SPECIFIED HYPOTENSION: ICD-10-CM

## 2020-01-01 LAB
ABO + RH BLD: NORMAL
ALBUMIN SERPL-MCNC: 3.4 G/DL (ref 3.4–5)
ALBUMIN SERPL-MCNC: 3.7 G/DL (ref 3.4–5)
ALP SERPL-CCNC: 169 U/L (ref 40–150)
ALP SERPL-CCNC: 175 U/L (ref 40–150)
ALT SERPL W P-5'-P-CCNC: 14 U/L (ref 0–70)
ALT SERPL W P-5'-P-CCNC: 21 U/L (ref 0–70)
ANION GAP SERPL CALCULATED.3IONS-SCNC: 0 MMOL/L
ANION GAP SERPL CALCULATED.3IONS-SCNC: 10 MMOL/L (ref 3–14)
ANION GAP SERPL CALCULATED.3IONS-SCNC: 11 MMOL/L (ref 3–14)
ANION GAP SERPL CALCULATED.3IONS-SCNC: 12 MMOL/L (ref 3–14)
ANION GAP SERPL CALCULATED.3IONS-SCNC: 14 MMOL/L (ref 3–14)
ANION GAP SERPL CALCULATED.3IONS-SCNC: 6 MMOL/L (ref 3–14)
ANION GAP SERPL CALCULATED.3IONS-SCNC: 6 MMOL/L (ref 3–14)
ANION GAP SERPL CALCULATED.3IONS-SCNC: 8 MMOL/L (ref 3–14)
ANION GAP SERPL CALCULATED.3IONS-SCNC: ABNORMAL MMOL/L
AST SERPL W P-5'-P-CCNC: 15 U/L (ref 0–45)
AST SERPL W P-5'-P-CCNC: 48 U/L (ref 0–45)
BACTERIA SPEC CULT: NO GROWTH
BACTERIA SPEC CULT: NO GROWTH
BASE DEFICIT BLDV-SCNC: 0.8 MMOL/L
BASE DEFICIT BLDV-SCNC: 2.5 MMOL/L
BASE DEFICIT BLDV-SCNC: 3.2 MMOL/L
BASE DEFICIT BLDV-SCNC: 3.3 MMOL/L
BASE EXCESS BLDA CALC-SCNC: 3.9 MMOL/L
BASOPHILS # BLD AUTO: 0 10E9/L (ref 0–0.2)
BASOPHILS # BLD AUTO: 0.1 10E9/L (ref 0–0.2)
BASOPHILS NFR BLD AUTO: 0.5 %
BASOPHILS NFR BLD AUTO: 0.6 %
BASOPHILS NFR BLD AUTO: 0.7 %
BILIRUB SERPL-MCNC: 0.5 MG/DL (ref 0.2–1.3)
BILIRUB SERPL-MCNC: 0.6 MG/DL (ref 0.2–1.3)
BLD GP AB SCN SERPL QL: NORMAL
BLD PROD TYP BPU: NORMAL
BLD PROD TYP BPU: NORMAL
BLD UNIT ID BPU: 0
BLOOD BANK CMNT PATIENT-IMP: NORMAL
BLOOD PRODUCT CODE: NORMAL
BPU ID: NORMAL
BUN SERPL-MCNC: 0 MG/DL
BUN SERPL-MCNC: 105 MG/DL (ref 7–30)
BUN SERPL-MCNC: 33 MG/DL (ref 7–30)
BUN SERPL-MCNC: 40 MG/DL (ref 7–30)
BUN SERPL-MCNC: 41 MG/DL (ref 7–30)
BUN SERPL-MCNC: 43 MG/DL (ref 7–30)
BUN SERPL-MCNC: 48 MG/DL (ref 7–30)
BUN SERPL-MCNC: 48 MG/DL (ref 7–30)
BUN SERPL-MCNC: 53 MG/DL (ref 7–30)
BUN SERPL-MCNC: 54 MG/DL (ref 7–30)
BUN SERPL-MCNC: 56 MG/DL (ref 7–30)
BUN SERPL-MCNC: 60 MG/DL (ref 7–30)
BUN SERPL-MCNC: 72 MG/DL (ref 9–23)
BUN SERPL-MCNC: 74 MG/DL (ref 7–30)
BUN SERPL-MCNC: 78 MG/DL (ref 7–30)
BUN SERPL-MCNC: 84 MG/DL (ref 7–30)
BUN SERPL-MCNC: 85 MG/DL (ref 7–30)
CALCIUM SERPL-MCNC: 0 MG/DL
CALCIUM SERPL-MCNC: 10 MG/DL (ref 8.5–10.1)
CALCIUM SERPL-MCNC: 10.1 MG/DL (ref 8.5–10.1)
CALCIUM SERPL-MCNC: 10.4 MG/DL (ref 8.5–10.1)
CALCIUM SERPL-MCNC: 8.7 MG/DL (ref 8.5–10.1)
CALCIUM SERPL-MCNC: 9.2 MG/DL (ref 8.5–10.1)
CALCIUM SERPL-MCNC: 9.2 MG/DL (ref 8.5–10.1)
CALCIUM SERPL-MCNC: 9.3 MG/DL (ref 8.5–10.1)
CALCIUM SERPL-MCNC: 9.3 MG/DL (ref 8.5–10.1)
CALCIUM SERPL-MCNC: 9.4 MG/DL (ref 8.5–10.1)
CALCIUM SERPL-MCNC: 9.4 MG/DL (ref 8.5–10.1)
CALCIUM SERPL-MCNC: 9.5 MG/DL (ref 8.5–10.1)
CALCIUM SERPL-MCNC: 9.5 MG/DL (ref 8.5–10.1)
CALCIUM SERPL-MCNC: 9.6 MG/DL (ref 8.5–10.1)
CALCIUM SERPL-MCNC: 9.8 MG/DL (ref 8.5–10.1)
CALCIUM SERPL-MCNC: 9.8 MG/DL (ref 8.5–10.1)
CALCIUM SERPL-MCNC: ABNORMAL MG/DL
CHLORIDE SERPL-SCNC: 100 MMOL/L (ref 94–109)
CHLORIDE SERPL-SCNC: 100 MMOL/L (ref 94–109)
CHLORIDE SERPL-SCNC: 102 MMOL/L (ref 94–109)
CHLORIDE SERPL-SCNC: 90 MMOL/L (ref 94–109)
CHLORIDE SERPL-SCNC: 94 MMOL/L (ref 94–109)
CHLORIDE SERPL-SCNC: 94 MMOL/L (ref 94–109)
CHLORIDE SERPL-SCNC: 95 MMOL/L (ref 94–109)
CHLORIDE SERPL-SCNC: 96 MMOL/L (ref 94–109)
CHLORIDE SERPL-SCNC: 97 MMOL/L (ref 94–109)
CHLORIDE SERPL-SCNC: 97 MMOL/L (ref 94–109)
CHLORIDE SERPLBLD-SCNC: 94 MMOL/L
CHLORIDE SERPLBLD-SCNC: 94 MMOL/L (ref 99–109)
CO2 SERPL-SCNC: 23 MMOL/L (ref 20–32)
CO2 SERPL-SCNC: 23 MMOL/L (ref 20–32)
CO2 SERPL-SCNC: 24 MMOL/L (ref 20–32)
CO2 SERPL-SCNC: 24 MMOL/L (ref 20–32)
CO2 SERPL-SCNC: 25 MMOL/L (ref 20–32)
CO2 SERPL-SCNC: 26 MMOL/L
CO2 SERPL-SCNC: 26 MMOL/L (ref 20–32)
CO2 SERPL-SCNC: 26 MMOL/L (ref 99–109)
CO2 SERPL-SCNC: 27 MMOL/L (ref 20–32)
CO2 SERPL-SCNC: 27 MMOL/L (ref 20–32)
CO2 SERPL-SCNC: 29 MMOL/L (ref 20–32)
CO2 SERPL-SCNC: 29 MMOL/L (ref 20–32)
CO2 SERPL-SCNC: 30 MMOL/L (ref 20–32)
CO2 SERPL-SCNC: 30 MMOL/L (ref 20–32)
CREAT SERPL-MCNC: 10.1 MG/DL (ref 0.66–1.25)
CREAT SERPL-MCNC: 5.06 MG/DL (ref 0.66–1.25)
CREAT SERPL-MCNC: 5.35 MG/DL (ref 0.66–1.25)
CREAT SERPL-MCNC: 5.64 MG/DL (ref 0.66–1.25)
CREAT SERPL-MCNC: 5.68 MG/DL (ref 0.66–1.25)
CREAT SERPL-MCNC: 6.41 MG/DL (ref 0.66–1.25)
CREAT SERPL-MCNC: 6.71 MG/DL (ref 0.66–1.25)
CREAT SERPL-MCNC: 6.78 MG/DL (ref 0.66–1.25)
CREAT SERPL-MCNC: 6.92 MG/DL (ref 0.66–1.25)
CREAT SERPL-MCNC: 7.01 MG/DL (ref 0.66–1.25)
CREAT SERPL-MCNC: 7.08 MG/DL (ref 0.66–1.25)
CREAT SERPL-MCNC: 7.54 MG/DL
CREAT SERPL-MCNC: 7.54 MG/DL (ref 0.7–1.3)
CREAT SERPL-MCNC: 8.69 MG/DL (ref 0.66–1.25)
CREAT SERPL-MCNC: 9.06 MG/DL (ref 0.66–1.25)
CREAT SERPL-MCNC: 9.53 MG/DL (ref 0.66–1.25)
CREAT SERPL-MCNC: 9.9 MG/DL (ref 0.66–1.25)
CV BLOOD PRESSURE: 48 %
D DIMER PPP FEU-MCNC: 0.4 UG/ML FEU (ref 0–0.5)
DIFFERENTIAL METHOD BLD: ABNORMAL
EOSINOPHIL # BLD AUTO: 0 10E9/L (ref 0–0.7)
EOSINOPHIL # BLD AUTO: 0.1 10E9/L (ref 0–0.7)
EOSINOPHIL # BLD AUTO: 0.2 10E9/L (ref 0–0.7)
EOSINOPHIL NFR BLD AUTO: 0.7 %
EOSINOPHIL NFR BLD AUTO: 0.9 %
EOSINOPHIL NFR BLD AUTO: 0.9 %
EOSINOPHIL NFR BLD AUTO: 1.1 %
EOSINOPHIL NFR BLD AUTO: 1.2 %
EOSINOPHIL NFR BLD AUTO: 1.7 %
EOSINOPHIL NFR BLD AUTO: 2 %
ERYTHROCYTE [DISTWIDTH] IN BLOOD BY AUTOMATED COUNT: 18 % (ref 10–15)
ERYTHROCYTE [DISTWIDTH] IN BLOOD BY AUTOMATED COUNT: 18 % (ref 10–15)
ERYTHROCYTE [DISTWIDTH] IN BLOOD BY AUTOMATED COUNT: 18.2 % (ref 10–15)
ERYTHROCYTE [DISTWIDTH] IN BLOOD BY AUTOMATED COUNT: 18.3 % (ref 10–15)
ERYTHROCYTE [DISTWIDTH] IN BLOOD BY AUTOMATED COUNT: 18.3 % (ref 10–15)
ERYTHROCYTE [DISTWIDTH] IN BLOOD BY AUTOMATED COUNT: 18.4 % (ref 10–15)
ERYTHROCYTE [DISTWIDTH] IN BLOOD BY AUTOMATED COUNT: 18.6 % (ref 10–15)
ERYTHROCYTE [DISTWIDTH] IN BLOOD BY AUTOMATED COUNT: 18.7 % (ref 10–15)
ERYTHROCYTE [DISTWIDTH] IN BLOOD BY AUTOMATED COUNT: 18.7 % (ref 10–15)
ERYTHROCYTE [DISTWIDTH] IN BLOOD BY AUTOMATED COUNT: 18.8 % (ref 10–15)
ERYTHROCYTE [DISTWIDTH] IN BLOOD BY AUTOMATED COUNT: 18.9 % (ref 10–15)
ERYTHROCYTE [DISTWIDTH] IN BLOOD BY AUTOMATED COUNT: 19.5 % (ref 10–15)
GFR SERPL CREATININE-BSD FRML MDRD: 0 ML/MIN/1.73M2
GFR SERPL CREATININE-BSD FRML MDRD: 10 ML/MIN/{1.73_M2}
GFR SERPL CREATININE-BSD FRML MDRD: 11 ML/MIN/{1.73_M2}
GFR SERPL CREATININE-BSD FRML MDRD: 5 ML/MIN/{1.73_M2}
GFR SERPL CREATININE-BSD FRML MDRD: 7 ML/MIN/{1.73_M2}
GFR SERPL CREATININE-BSD FRML MDRD: 8 ML/MIN/{1.73_M2}
GFR SERPL CREATININE-BSD FRML MDRD: 9 ML/MIN/{1.73_M2}
GFR SERPL CREATININE-BSD FRML MDRD: 9 ML/MIN/{1.73_M2}
GFR SERPL CREATININE-BSD FRML MDRD: ABNORMAL ML/MIN/{1.73_M2}
GLUCOSE BLDC GLUCOMTR-MCNC: 113 MG/DL (ref 70–99)
GLUCOSE BLDC GLUCOMTR-MCNC: 113 MG/DL (ref 70–99)
GLUCOSE BLDC GLUCOMTR-MCNC: 116 MG/DL (ref 70–99)
GLUCOSE BLDC GLUCOMTR-MCNC: 117 MG/DL (ref 70–99)
GLUCOSE BLDC GLUCOMTR-MCNC: 121 MG/DL (ref 70–99)
GLUCOSE BLDC GLUCOMTR-MCNC: 125 MG/DL (ref 70–99)
GLUCOSE BLDC GLUCOMTR-MCNC: 127 MG/DL (ref 70–99)
GLUCOSE BLDC GLUCOMTR-MCNC: 127 MG/DL (ref 70–99)
GLUCOSE BLDC GLUCOMTR-MCNC: 128 MG/DL (ref 70–99)
GLUCOSE BLDC GLUCOMTR-MCNC: 130 MG/DL (ref 70–99)
GLUCOSE BLDC GLUCOMTR-MCNC: 134 MG/DL (ref 70–99)
GLUCOSE BLDC GLUCOMTR-MCNC: 137 MG/DL (ref 70–99)
GLUCOSE BLDC GLUCOMTR-MCNC: 138 MG/DL (ref 70–99)
GLUCOSE BLDC GLUCOMTR-MCNC: 139 MG/DL (ref 70–99)
GLUCOSE BLDC GLUCOMTR-MCNC: 141 MG/DL (ref 70–99)
GLUCOSE BLDC GLUCOMTR-MCNC: 143 MG/DL (ref 70–99)
GLUCOSE BLDC GLUCOMTR-MCNC: 143 MG/DL (ref 70–99)
GLUCOSE BLDC GLUCOMTR-MCNC: 146 MG/DL (ref 70–99)
GLUCOSE BLDC GLUCOMTR-MCNC: 146 MG/DL (ref 70–99)
GLUCOSE BLDC GLUCOMTR-MCNC: 147 MG/DL (ref 70–99)
GLUCOSE BLDC GLUCOMTR-MCNC: 148 MG/DL (ref 70–99)
GLUCOSE BLDC GLUCOMTR-MCNC: 153 MG/DL (ref 70–99)
GLUCOSE BLDC GLUCOMTR-MCNC: 154 MG/DL (ref 70–99)
GLUCOSE BLDC GLUCOMTR-MCNC: 159 MG/DL (ref 70–99)
GLUCOSE BLDC GLUCOMTR-MCNC: 161 MG/DL (ref 70–99)
GLUCOSE BLDC GLUCOMTR-MCNC: 169 MG/DL (ref 70–99)
GLUCOSE BLDC GLUCOMTR-MCNC: 169 MG/DL (ref 70–99)
GLUCOSE BLDC GLUCOMTR-MCNC: 170 MG/DL (ref 70–99)
GLUCOSE BLDC GLUCOMTR-MCNC: 171 MG/DL (ref 70–99)
GLUCOSE BLDC GLUCOMTR-MCNC: 173 MG/DL (ref 70–99)
GLUCOSE BLDC GLUCOMTR-MCNC: 175 MG/DL (ref 70–99)
GLUCOSE BLDC GLUCOMTR-MCNC: 182 MG/DL (ref 70–99)
GLUCOSE BLDC GLUCOMTR-MCNC: 184 MG/DL (ref 70–99)
GLUCOSE BLDC GLUCOMTR-MCNC: 186 MG/DL (ref 70–99)
GLUCOSE BLDC GLUCOMTR-MCNC: 187 MG/DL (ref 70–99)
GLUCOSE BLDC GLUCOMTR-MCNC: 188 MG/DL (ref 70–99)
GLUCOSE BLDC GLUCOMTR-MCNC: 190 MG/DL (ref 70–99)
GLUCOSE BLDC GLUCOMTR-MCNC: 192 MG/DL (ref 70–99)
GLUCOSE BLDC GLUCOMTR-MCNC: 192 MG/DL (ref 70–99)
GLUCOSE BLDC GLUCOMTR-MCNC: 198 MG/DL (ref 70–99)
GLUCOSE BLDC GLUCOMTR-MCNC: 218 MG/DL (ref 70–99)
GLUCOSE BLDC GLUCOMTR-MCNC: 225 MG/DL (ref 70–99)
GLUCOSE BLDC GLUCOMTR-MCNC: 230 MG/DL (ref 70–99)
GLUCOSE BLDC GLUCOMTR-MCNC: 244 MG/DL (ref 70–99)
GLUCOSE BLDC GLUCOMTR-MCNC: 245 MG/DL (ref 70–99)
GLUCOSE BLDC GLUCOMTR-MCNC: 248 MG/DL (ref 70–99)
GLUCOSE BLDC GLUCOMTR-MCNC: 256 MG/DL (ref 70–99)
GLUCOSE BLDC GLUCOMTR-MCNC: 264 MG/DL (ref 70–99)
GLUCOSE BLDC GLUCOMTR-MCNC: 278 MG/DL (ref 70–99)
GLUCOSE BLDC GLUCOMTR-MCNC: 312 MG/DL (ref 70–99)
GLUCOSE SERPL-MCNC: 0 MG/DL (ref 70–99)
GLUCOSE SERPL-MCNC: 107 MG/DL (ref 70–99)
GLUCOSE SERPL-MCNC: 121 MG/DL (ref 70–99)
GLUCOSE SERPL-MCNC: 134 MG/DL (ref 70–99)
GLUCOSE SERPL-MCNC: 134 MG/DL (ref 70–99)
GLUCOSE SERPL-MCNC: 139 MG/DL (ref 70–99)
GLUCOSE SERPL-MCNC: 162 MG/DL (ref 70–99)
GLUCOSE SERPL-MCNC: 167 MG/DL (ref 70–99)
GLUCOSE SERPL-MCNC: 173 MG/DL (ref 70–99)
GLUCOSE SERPL-MCNC: 186 MG/DL (ref 70–99)
GLUCOSE SERPL-MCNC: 203 MG/DL (ref 70–99)
GLUCOSE SERPL-MCNC: 232 MG/DL (ref 70–99)
GLUCOSE SERPL-MCNC: 235 MG/DL (ref 70–99)
GLUCOSE SERPL-MCNC: 235 MG/DL (ref 70–99)
GLUCOSE SERPL-MCNC: 284 MG/DL (ref 70–99)
GLUCOSE SERPL-MCNC: 357 MG/DL (ref 70–99)
GLUCOSE SERPL-MCNC: 365 MG/DL (ref 70–99)
GLUCOSE SERPL-MCNC: ABNORMAL MG/DL (ref 70–99)
HBA1C MFR BLD: 5.1 % (ref 0–5.6)
HCO3 BLD-SCNC: 29 MMOL/L (ref 21–28)
HCO3 BLDV-SCNC: 25 MMOL/L (ref 21–28)
HCO3 BLDV-SCNC: 26 MMOL/L (ref 21–28)
HCO3 BLDV-SCNC: 26 MMOL/L (ref 21–28)
HCO3 BLDV-SCNC: 28 MMOL/L (ref 21–28)
HCT VFR BLD AUTO: 24.5 % (ref 40–53)
HCT VFR BLD AUTO: 25.2 % (ref 42–52)
HCT VFR BLD AUTO: 26 % (ref 40–53)
HCT VFR BLD AUTO: 26.7 % (ref 40–53)
HCT VFR BLD AUTO: 27 % (ref 40–53)
HCT VFR BLD AUTO: 27.1 % (ref 40–53)
HCT VFR BLD AUTO: 27.5 % (ref 40–53)
HCT VFR BLD AUTO: 27.9 % (ref 40–53)
HCT VFR BLD AUTO: 28.5 % (ref 40–53)
HCT VFR BLD AUTO: 28.7 % (ref 40–53)
HCT VFR BLD AUTO: 30.2 % (ref 40–53)
HCT VFR BLD AUTO: 33.9 % (ref 40–53)
HCT VFR BLD AUTO: 34.7 % (ref 40–53)
HCT VFR BLD AUTO: 35 % (ref 40–53)
HCT VFR BLD AUTO: 35.2 % (ref 40–53)
HCT VFR BLD AUTO: 39.4 % (ref 40–53)
HEMOGLOBIN: 8.4 G/DL
HEMOGLOBIN: 8.4 G/DL (ref 14–18)
HGB BLD-MCNC: 10 G/DL (ref 13.3–17.7)
HGB BLD-MCNC: 10.1 G/DL (ref 13.3–17.7)
HGB BLD-MCNC: 10.2 G/DL (ref 13.3–17.7)
HGB BLD-MCNC: 10.3 G/DL (ref 13.3–17.7)
HGB BLD-MCNC: 11.1 G/DL (ref 13.3–17.7)
HGB BLD-MCNC: 7.2 G/DL (ref 13.3–17.7)
HGB BLD-MCNC: 7.6 G/DL (ref 13.3–17.7)
HGB BLD-MCNC: 7.8 G/DL (ref 13.3–17.7)
HGB BLD-MCNC: 7.9 G/DL (ref 13.3–17.7)
HGB BLD-MCNC: 8.1 G/DL (ref 13.3–17.7)
HGB BLD-MCNC: 8.3 G/DL (ref 13.3–17.7)
HGB BLD-MCNC: 8.5 G/DL (ref 13.3–17.7)
HGB BLD-MCNC: 8.7 G/DL (ref 13.3–17.7)
IMM GRANULOCYTES # BLD: 0 10E9/L (ref 0–0.4)
IMM GRANULOCYTES # BLD: 0.1 10E9/L (ref 0–0.4)
IMM GRANULOCYTES NFR BLD: 0.4 %
IMM GRANULOCYTES NFR BLD: 0.5 %
IMM GRANULOCYTES NFR BLD: 0.6 %
IMM GRANULOCYTES NFR BLD: 0.7 %
IMM GRANULOCYTES NFR BLD: 0.8 %
IMM GRANULOCYTES NFR BLD: 0.9 %
INR PPP: 0.95 (ref 0.86–1.14)
INR PPP: 1.08 (ref 0.86–1.14)
INTERPRETATION ECG - MUSE: NORMAL
KCT BLD-ACNC: 146 SEC (ref 75–150)
LACTATE BLD-SCNC: 2.1 MMOL/L (ref 0.7–2)
LACTATE BLD-SCNC: 2.9 MMOL/L (ref 0.7–2)
LIPASE SERPL-CCNC: 244 U/L (ref 73–393)
LMWH PPP CHRO-ACNC: 0.1 IU/ML
LMWH PPP CHRO-ACNC: 0.1 IU/ML
LMWH PPP CHRO-ACNC: 0.12 IU/ML
LMWH PPP CHRO-ACNC: 0.15 IU/ML
LMWH PPP CHRO-ACNC: 0.15 IU/ML
LMWH PPP CHRO-ACNC: 0.24 IU/ML
LMWH PPP CHRO-ACNC: <0.1 IU/ML
LYMPHOCYTES # BLD AUTO: 0.7 10E9/L (ref 0.8–5.3)
LYMPHOCYTES # BLD AUTO: 0.8 10E9/L (ref 0.8–5.3)
LYMPHOCYTES # BLD AUTO: 0.9 10E9/L (ref 0.8–5.3)
LYMPHOCYTES # BLD AUTO: 2.2 10E9/L (ref 0.8–5.3)
LYMPHOCYTES NFR BLD AUTO: 12.7 %
LYMPHOCYTES NFR BLD AUTO: 12.9 %
LYMPHOCYTES NFR BLD AUTO: 13.6 %
LYMPHOCYTES NFR BLD AUTO: 14 %
LYMPHOCYTES NFR BLD AUTO: 14.3 %
LYMPHOCYTES NFR BLD AUTO: 14.4 %
LYMPHOCYTES NFR BLD AUTO: 15 %
LYMPHOCYTES NFR BLD AUTO: 16.2 %
LYMPHOCYTES NFR BLD AUTO: 17.6 %
Lab: NORMAL
MAGNESIUM SERPL-MCNC: 2.4 MG/DL (ref 1.6–2.3)
MCH RBC QN AUTO: 26.3 PG (ref 26.5–33)
MCH RBC QN AUTO: 26.3 PG (ref 26.5–33)
MCH RBC QN AUTO: 26.5 PG (ref 26.5–33)
MCH RBC QN AUTO: 26.5 PG (ref 26.5–33)
MCH RBC QN AUTO: 27 PG (ref 26.5–33)
MCH RBC QN AUTO: 27 PG (ref 26.5–33)
MCH RBC QN AUTO: 27.1 PG (ref 26.5–33)
MCH RBC QN AUTO: 27.2 PG (ref 26.5–33)
MCH RBC QN AUTO: 27.3 PG (ref 26.5–33)
MCH RBC QN AUTO: 27.4 PG (ref 26.5–33)
MCH RBC QN AUTO: 27.4 PG (ref 26.5–33)
MCH RBC QN AUTO: 27.5 PG (ref 26.5–33)
MCH RBC QN AUTO: 27.8 PG (ref 26.5–33)
MCHC RBC AUTO-ENTMCNC: 28.2 G/DL (ref 31.5–36.5)
MCHC RBC AUTO-ENTMCNC: 28.7 G/DL (ref 31.5–36.5)
MCHC RBC AUTO-ENTMCNC: 28.8 G/DL (ref 31.5–36.5)
MCHC RBC AUTO-ENTMCNC: 28.9 G/DL (ref 31.5–36.5)
MCHC RBC AUTO-ENTMCNC: 29 G/DL (ref 31.5–36.5)
MCHC RBC AUTO-ENTMCNC: 29 G/DL (ref 31.5–36.5)
MCHC RBC AUTO-ENTMCNC: 29.1 G/DL (ref 31.5–36.5)
MCHC RBC AUTO-ENTMCNC: 29.1 G/DL (ref 31.5–36.5)
MCHC RBC AUTO-ENTMCNC: 29.2 G/DL (ref 31.5–36.5)
MCHC RBC AUTO-ENTMCNC: 29.2 G/DL (ref 31.5–36.5)
MCHC RBC AUTO-ENTMCNC: 29.4 G/DL (ref 31.5–36.5)
MCHC RBC AUTO-ENTMCNC: 29.4 G/DL (ref 31.5–36.5)
MCHC RBC AUTO-ENTMCNC: 29.5 G/DL (ref 31.5–36.5)
MCHC RBC AUTO-ENTMCNC: 29.6 G/DL (ref 31.5–36.5)
MCHC RBC AUTO-ENTMCNC: 29.6 G/DL (ref 31.5–36.5)
MCV RBC AUTO: 89 FL (ref 78–100)
MCV RBC AUTO: 91 FL (ref 78–100)
MCV RBC AUTO: 91 FL (ref 78–100)
MCV RBC AUTO: 92 FL (ref 78–100)
MCV RBC AUTO: 93 FL (ref 78–100)
MCV RBC AUTO: 94 FL (ref 78–100)
MCV RBC AUTO: 95 FL (ref 78–100)
MCV RBC AUTO: 96 FL (ref 78–100)
MDC_IDC_EPISODE_DTM: NORMAL
MDC_IDC_EPISODE_ID: NORMAL
MDC_IDC_EPISODE_TYPE: NORMAL
MDC_IDC_LEAD_IMPLANT_DT: NORMAL
MDC_IDC_LEAD_LOCATION: NORMAL
MDC_IDC_LEAD_LOCATION_DETAIL_1: NORMAL
MDC_IDC_LEAD_MFG: NORMAL
MDC_IDC_LEAD_MODEL: NORMAL
MDC_IDC_LEAD_POLARITY_TYPE: NORMAL
MDC_IDC_LEAD_SERIAL: NORMAL
MDC_IDC_MSMT_BATTERY_DTM: NORMAL
MDC_IDC_MSMT_BATTERY_DTM: NORMAL
MDC_IDC_MSMT_BATTERY_REMAINING_LONGEVITY: 168 MO
MDC_IDC_MSMT_BATTERY_REMAINING_LONGEVITY: 168 MO
MDC_IDC_MSMT_BATTERY_REMAINING_PERCENTAGE: 100 %
MDC_IDC_MSMT_BATTERY_REMAINING_PERCENTAGE: 100 %
MDC_IDC_MSMT_BATTERY_STATUS: NORMAL
MDC_IDC_MSMT_BATTERY_STATUS: NORMAL
MDC_IDC_MSMT_LEADCHNL_RA_IMPEDANCE_VALUE: 582 OHM
MDC_IDC_MSMT_LEADCHNL_RA_IMPEDANCE_VALUE: 651 OHM
MDC_IDC_MSMT_LEADCHNL_RA_PACING_THRESHOLD_AMPLITUDE: 0.6 V
MDC_IDC_MSMT_LEADCHNL_RA_PACING_THRESHOLD_AMPLITUDE: 0.7 V
MDC_IDC_MSMT_LEADCHNL_RA_PACING_THRESHOLD_PULSEWIDTH: 0.4 MS
MDC_IDC_MSMT_LEADCHNL_RA_PACING_THRESHOLD_PULSEWIDTH: 0.4 MS
MDC_IDC_MSMT_LEADCHNL_RV_IMPEDANCE_VALUE: 777 OHM
MDC_IDC_MSMT_LEADCHNL_RV_IMPEDANCE_VALUE: 806 OHM
MDC_IDC_MSMT_LEADCHNL_RV_PACING_THRESHOLD_AMPLITUDE: 0.8 V
MDC_IDC_MSMT_LEADCHNL_RV_PACING_THRESHOLD_AMPLITUDE: 0.8 V
MDC_IDC_MSMT_LEADCHNL_RV_PACING_THRESHOLD_PULSEWIDTH: 0.4 MS
MDC_IDC_MSMT_LEADCHNL_RV_PACING_THRESHOLD_PULSEWIDTH: 0.4 MS
MDC_IDC_PG_IMPLANT_DTM: NORMAL
MDC_IDC_PG_IMPLANT_DTM: NORMAL
MDC_IDC_PG_MFG: NORMAL
MDC_IDC_PG_MFG: NORMAL
MDC_IDC_PG_MODEL: NORMAL
MDC_IDC_PG_MODEL: NORMAL
MDC_IDC_PG_SERIAL: NORMAL
MDC_IDC_PG_SERIAL: NORMAL
MDC_IDC_PG_TYPE: NORMAL
MDC_IDC_PG_TYPE: NORMAL
MDC_IDC_SESS_CLINIC_NAME: NORMAL
MDC_IDC_SESS_CLINIC_NAME: NORMAL
MDC_IDC_SESS_DTM: NORMAL
MDC_IDC_SESS_DTM: NORMAL
MDC_IDC_SESS_TYPE: NORMAL
MDC_IDC_SESS_TYPE: NORMAL
MDC_IDC_SET_BRADY_AT_MODE_SWITCH_MODE: NORMAL
MDC_IDC_SET_BRADY_AT_MODE_SWITCH_MODE: NORMAL
MDC_IDC_SET_BRADY_AT_MODE_SWITCH_RATE: 170 {BEATS}/MIN
MDC_IDC_SET_BRADY_AT_MODE_SWITCH_RATE: 170 {BEATS}/MIN
MDC_IDC_SET_BRADY_LOWRATE: 60 {BEATS}/MIN
MDC_IDC_SET_BRADY_LOWRATE: 60 {BEATS}/MIN
MDC_IDC_SET_BRADY_MAX_SENSOR_RATE: 130 {BEATS}/MIN
MDC_IDC_SET_BRADY_MAX_SENSOR_RATE: 130 {BEATS}/MIN
MDC_IDC_SET_BRADY_MAX_TRACKING_RATE: 130 {BEATS}/MIN
MDC_IDC_SET_BRADY_MAX_TRACKING_RATE: 130 {BEATS}/MIN
MDC_IDC_SET_BRADY_MODE: NORMAL
MDC_IDC_SET_BRADY_MODE: NORMAL
MDC_IDC_SET_BRADY_PAV_DELAY_HIGH: 150 MS
MDC_IDC_SET_BRADY_PAV_DELAY_HIGH: 150 MS
MDC_IDC_SET_BRADY_PAV_DELAY_LOW: 200 MS
MDC_IDC_SET_BRADY_PAV_DELAY_LOW: 200 MS
MDC_IDC_SET_BRADY_SAV_DELAY_HIGH: 150 MS
MDC_IDC_SET_BRADY_SAV_DELAY_HIGH: 150 MS
MDC_IDC_SET_BRADY_SAV_DELAY_LOW: 200 MS
MDC_IDC_SET_BRADY_SAV_DELAY_LOW: 200 MS
MDC_IDC_SET_LEADCHNL_RA_PACING_AMPLITUDE: 2 V
MDC_IDC_SET_LEADCHNL_RA_PACING_AMPLITUDE: 2 V
MDC_IDC_SET_LEADCHNL_RA_PACING_CAPTURE_MODE: NORMAL
MDC_IDC_SET_LEADCHNL_RA_PACING_CAPTURE_MODE: NORMAL
MDC_IDC_SET_LEADCHNL_RA_PACING_POLARITY: NORMAL
MDC_IDC_SET_LEADCHNL_RA_PACING_POLARITY: NORMAL
MDC_IDC_SET_LEADCHNL_RA_PACING_PULSEWIDTH: 0.4 MS
MDC_IDC_SET_LEADCHNL_RA_PACING_PULSEWIDTH: 0.4 MS
MDC_IDC_SET_LEADCHNL_RA_SENSING_ADAPTATION_MODE: NORMAL
MDC_IDC_SET_LEADCHNL_RA_SENSING_ADAPTATION_MODE: NORMAL
MDC_IDC_SET_LEADCHNL_RA_SENSING_POLARITY: NORMAL
MDC_IDC_SET_LEADCHNL_RA_SENSING_POLARITY: NORMAL
MDC_IDC_SET_LEADCHNL_RA_SENSING_SENSITIVITY: 0.25 MV
MDC_IDC_SET_LEADCHNL_RA_SENSING_SENSITIVITY: 0.25 MV
MDC_IDC_SET_LEADCHNL_RV_PACING_AMPLITUDE: 1.2 V
MDC_IDC_SET_LEADCHNL_RV_PACING_AMPLITUDE: 1.4 V
MDC_IDC_SET_LEADCHNL_RV_PACING_CAPTURE_MODE: NORMAL
MDC_IDC_SET_LEADCHNL_RV_PACING_CAPTURE_MODE: NORMAL
MDC_IDC_SET_LEADCHNL_RV_PACING_POLARITY: NORMAL
MDC_IDC_SET_LEADCHNL_RV_PACING_POLARITY: NORMAL
MDC_IDC_SET_LEADCHNL_RV_PACING_PULSEWIDTH: 0.4 MS
MDC_IDC_SET_LEADCHNL_RV_PACING_PULSEWIDTH: 0.4 MS
MDC_IDC_SET_LEADCHNL_RV_SENSING_ADAPTATION_MODE: NORMAL
MDC_IDC_SET_LEADCHNL_RV_SENSING_ADAPTATION_MODE: NORMAL
MDC_IDC_SET_LEADCHNL_RV_SENSING_POLARITY: NORMAL
MDC_IDC_SET_LEADCHNL_RV_SENSING_POLARITY: NORMAL
MDC_IDC_SET_LEADCHNL_RV_SENSING_SENSITIVITY: 1.5 MV
MDC_IDC_SET_LEADCHNL_RV_SENSING_SENSITIVITY: 1.5 MV
MDC_IDC_SET_ZONE_DETECTION_INTERVAL: 375 MS
MDC_IDC_SET_ZONE_DETECTION_INTERVAL: 375 MS
MDC_IDC_SET_ZONE_TYPE: NORMAL
MDC_IDC_SET_ZONE_TYPE: NORMAL
MDC_IDC_SET_ZONE_VENDOR_TYPE: NORMAL
MDC_IDC_SET_ZONE_VENDOR_TYPE: NORMAL
MDC_IDC_STAT_AT_BURDEN_PERCENT: 0 %
MDC_IDC_STAT_AT_BURDEN_PERCENT: 0 %
MDC_IDC_STAT_AT_DTM_END: NORMAL
MDC_IDC_STAT_AT_DTM_END: NORMAL
MDC_IDC_STAT_AT_DTM_START: NORMAL
MDC_IDC_STAT_AT_DTM_START: NORMAL
MDC_IDC_STAT_BRADY_DTM_END: NORMAL
MDC_IDC_STAT_BRADY_DTM_END: NORMAL
MDC_IDC_STAT_BRADY_DTM_START: NORMAL
MDC_IDC_STAT_BRADY_DTM_START: NORMAL
MDC_IDC_STAT_BRADY_RA_PERCENT_PACED: 13 %
MDC_IDC_STAT_BRADY_RA_PERCENT_PACED: 18 %
MDC_IDC_STAT_BRADY_RV_PERCENT_PACED: 0 %
MDC_IDC_STAT_BRADY_RV_PERCENT_PACED: 0 %
MDC_IDC_STAT_EPISODE_RECENT_COUNT: 0
MDC_IDC_STAT_EPISODE_RECENT_COUNT_DTM_END: NORMAL
MDC_IDC_STAT_EPISODE_RECENT_COUNT_DTM_START: NORMAL
MDC_IDC_STAT_EPISODE_TYPE: NORMAL
MDC_IDC_STAT_EPISODE_VENDOR_TYPE: NORMAL
MONOCYTES # BLD AUTO: 0.4 10E9/L (ref 0–1.3)
MONOCYTES # BLD AUTO: 0.5 10E9/L (ref 0–1.3)
MONOCYTES # BLD AUTO: 0.5 10E9/L (ref 0–1.3)
MONOCYTES # BLD AUTO: 0.9 10E9/L (ref 0–1.3)
MONOCYTES NFR BLD AUTO: 5.6 %
MONOCYTES NFR BLD AUTO: 5.8 %
MONOCYTES NFR BLD AUTO: 6.4 %
MONOCYTES NFR BLD AUTO: 6.6 %
MONOCYTES NFR BLD AUTO: 6.7 %
MONOCYTES NFR BLD AUTO: 7.5 %
MONOCYTES NFR BLD AUTO: 8.1 %
MONOCYTES NFR BLD AUTO: 8.3 %
MONOCYTES NFR BLD AUTO: 9.2 %
MRSA DNA SPEC QL NAA+PROBE: NEGATIVE
NEUTROPHILS # BLD AUTO: 11.3 10E9/L (ref 1.6–8.3)
NEUTROPHILS # BLD AUTO: 3.7 10E9/L (ref 1.6–8.3)
NEUTROPHILS # BLD AUTO: 3.8 10E9/L (ref 1.6–8.3)
NEUTROPHILS # BLD AUTO: 4.1 10E9/L (ref 1.6–8.3)
NEUTROPHILS # BLD AUTO: 4.2 10E9/L (ref 1.6–8.3)
NEUTROPHILS # BLD AUTO: 4.4 10E9/L (ref 1.6–8.3)
NEUTROPHILS # BLD AUTO: 4.8 10E9/L (ref 1.6–8.3)
NEUTROPHILS # BLD AUTO: 5 10E9/L (ref 1.6–8.3)
NEUTROPHILS # BLD AUTO: 5 10E9/L (ref 1.6–8.3)
NEUTROPHILS NFR BLD AUTO: 71.6 %
NEUTROPHILS NFR BLD AUTO: 71.9 %
NEUTROPHILS NFR BLD AUTO: 74.5 %
NEUTROPHILS NFR BLD AUTO: 76.6 %
NEUTROPHILS NFR BLD AUTO: 76.6 %
NEUTROPHILS NFR BLD AUTO: 77.4 %
NEUTROPHILS NFR BLD AUTO: 77.7 %
NEUTROPHILS NFR BLD AUTO: 77.9 %
NEUTROPHILS NFR BLD AUTO: 78.7 %
NRBC # BLD AUTO: 0 10*3/UL
NRBC BLD AUTO-RTO: 0 /100
NT-PROBNP SERPL-MCNC: ABNORMAL PG/ML (ref 0–900)
NUC STRESS EJECTION FRACTION: 52 %
NUM BPU REQUESTED: 1
O2/TOTAL GAS SETTING VFR VENT: ABNORMAL %
OXYHGB MFR BLD: 85 % (ref 92–100)
OXYHGB MFR BLDV: 31 %
OXYHGB MFR BLDV: 48 %
OXYHGB MFR BLDV: 55 %
OXYHGB MFR BLDV: 57 %
PCO2 BLD: 48 MM HG (ref 35–45)
PCO2 BLDV: 62 MM HG (ref 40–50)
PCO2 BLDV: 64 MM HG (ref 40–50)
PCO2 BLDV: 65 MM HG (ref 40–50)
PCO2 BLDV: 69 MM HG (ref 40–50)
PH BLD: 7.4 PH (ref 7.35–7.45)
PH BLDV: 7.19 PH (ref 7.32–7.43)
PH BLDV: 7.22 PH (ref 7.32–7.43)
PH BLDV: 7.22 PH (ref 7.32–7.43)
PH BLDV: 7.25 PH (ref 7.32–7.43)
PHOSPHATE SERPL-MCNC: 6.9 MG/DL
PHOSPHATE SERPL-MCNC: 6.9 MG/DL (ref 2.4–5.1)
PLATELET # BLD AUTO: 113 10E9/L (ref 150–450)
PLATELET # BLD AUTO: 120 10E9/L (ref 150–450)
PLATELET # BLD AUTO: 121 10E9/L (ref 150–450)
PLATELET # BLD AUTO: 123 10E9/L (ref 150–450)
PLATELET # BLD AUTO: 125 10E9/L (ref 150–450)
PLATELET # BLD AUTO: 125 10E9/L (ref 150–450)
PLATELET # BLD AUTO: 135 10E9/L (ref 150–450)
PLATELET # BLD AUTO: 137 10E9/L (ref 150–450)
PLATELET # BLD AUTO: 138 10E9/L (ref 150–450)
PLATELET # BLD AUTO: 139 10E9/L (ref 150–450)
PLATELET # BLD AUTO: 141 10E9/L (ref 150–450)
PLATELET # BLD AUTO: 143 10E9/L (ref 150–450)
PLATELET # BLD AUTO: 154 10E9/L (ref 150–450)
PLATELET # BLD AUTO: 154 10E9/L (ref 150–450)
PLATELET # BLD AUTO: 247 10E9/L (ref 150–450)
PO2 BLD: 59 MM HG (ref 80–105)
PO2 BLDV: 28 MM HG (ref 25–47)
PO2 BLDV: 28 MM HG (ref 25–47)
PO2 BLDV: 36 MM HG (ref 25–47)
PO2 BLDV: 38 MM HG (ref 25–47)
POTASSIUM SERPL-SCNC: 3.6 MMOL/L (ref 3.4–5.3)
POTASSIUM SERPL-SCNC: 3.8 MMOL/L (ref 3.4–5.3)
POTASSIUM SERPL-SCNC: 3.8 MMOL/L (ref 3.4–5.3)
POTASSIUM SERPL-SCNC: 4 MMOL/L (ref 3.4–5.3)
POTASSIUM SERPL-SCNC: 4.2 MMOL/L (ref 3.4–5.3)
POTASSIUM SERPL-SCNC: 4.3 MMOL/L
POTASSIUM SERPL-SCNC: 4.3 MMOL/L (ref 3.4–5.3)
POTASSIUM SERPL-SCNC: 4.3 MMOL/L (ref 3.5–5.5)
POTASSIUM SERPL-SCNC: 4.3 MMOL/L (ref 3.5–5.5)
POTASSIUM SERPL-SCNC: 4.4 MMOL/L (ref 3.4–5.3)
POTASSIUM SERPL-SCNC: 4.5 MMOL/L (ref 3.4–5.3)
POTASSIUM SERPL-SCNC: 4.5 MMOL/L (ref 3.4–5.3)
POTASSIUM SERPL-SCNC: 4.6 MMOL/L (ref 3.4–5.3)
POTASSIUM SERPL-SCNC: 4.8 MMOL/L (ref 3.4–5.3)
POTASSIUM SERPL-SCNC: 4.9 MMOL/L
POTASSIUM SERPL-SCNC: 4.9 MMOL/L (ref 3.5–5.5)
POTASSIUM SERPL-SCNC: 5 MMOL/L (ref 3.4–5.3)
POTASSIUM SERPL-SCNC: 5 MMOL/L (ref 3.4–5.3)
POTASSIUM SERPL-SCNC: 5.4 MMOL/L (ref 3.4–5.3)
POTASSIUM SERPL-SCNC: 5.6 MMOL/L (ref 3.4–5.3)
PROCALCITONIN SERPL-MCNC: 1.72 NG/ML
PROT SERPL-MCNC: 7.9 G/DL (ref 6.8–8.8)
PROT SERPL-MCNC: 8.4 G/DL (ref 6.8–8.8)
RBC # BLD AUTO: 2.74 10E12/L (ref 4.4–5.9)
RBC # BLD AUTO: 2.87 10E12/L (ref 4.4–5.9)
RBC # BLD AUTO: 2.92 10E12/L (ref 4.4–5.9)
RBC # BLD AUTO: 2.93 10E12/L (ref 4.4–5.9)
RBC # BLD AUTO: 2.94 10E12/L (ref 4.4–5.9)
RBC # BLD AUTO: 2.97 10E12/L (ref 4.4–5.9)
RBC # BLD AUTO: 3 10E12/L (ref 4.4–5.9)
RBC # BLD AUTO: 3.06 10E12/L (ref 4.4–5.9)
RBC # BLD AUTO: 3.13 10E12/L (ref 4.4–5.9)
RBC # BLD AUTO: 3.22 10E12/L (ref 4.4–5.9)
RBC # BLD AUTO: 3.6 10E12/L (ref 4.4–5.9)
RBC # BLD AUTO: 3.69 10E12/L (ref 4.4–5.9)
RBC # BLD AUTO: 3.72 10E12/L (ref 4.4–5.9)
RBC # BLD AUTO: 3.75 10E12/L (ref 4.4–5.9)
RBC # BLD AUTO: 4.1 10E12/L (ref 4.4–5.9)
SARS-COV-2 PCR COMMENT: NORMAL
SARS-COV-2 RNA SPEC QL NAA+PROBE: NEGATIVE
SARS-COV-2 RNA SPEC QL NAA+PROBE: NORMAL
SODIUM SERPL-SCNC: 127 MMOL/L (ref 133–144)
SODIUM SERPL-SCNC: 129 MMOL/L (ref 133–144)
SODIUM SERPL-SCNC: 129 MMOL/L (ref 133–144)
SODIUM SERPL-SCNC: 131 MMOL/L (ref 133–144)
SODIUM SERPL-SCNC: 132 MMOL/L (ref 133–144)
SODIUM SERPL-SCNC: 133 MMOL/L (ref 133–144)
SODIUM SERPL-SCNC: 135 MMOL/L (ref 133–144)
SODIUM SERPL-SCNC: 136 MMOL/L
SODIUM SERPL-SCNC: 136 MMOL/L (ref 132–146)
SODIUM SERPL-SCNC: 136 MMOL/L (ref 133–144)
SODIUM SERPL-SCNC: 137 MMOL/L (ref 133–144)
SPECIMEN EXP DATE BLD: NORMAL
SPECIMEN SOURCE: NORMAL
STRESS ECHO TARGET HR: 148
STRESS/REST PERFUSION RATIO: 1.04
TRANSFUSION STATUS PATIENT QL: NORMAL
TRANSFUSION STATUS PATIENT QL: NORMAL
TROPONIN I BLD-MCNC: 3.22 UG/L (ref 0–0.08)
TROPONIN I SERPL-MCNC: 0.04 UG/L (ref 0–0.04)
TROPONIN I SERPL-MCNC: 0.06 UG/L (ref 0–0.04)
TROPONIN I SERPL-MCNC: 0.07 UG/L (ref 0–0.04)
TROPONIN I SERPL-MCNC: 0.08 UG/L (ref 0–0.04)
TROPONIN I SERPL-MCNC: 0.16 UG/L (ref 0–0.04)
TROPONIN I SERPL-MCNC: 0.37 UG/L (ref 0–0.04)
TROPONIN I SERPL-MCNC: 0.41 UG/L (ref 0–0.04)
TROPONIN I SERPL-MCNC: 1.64 UG/L (ref 0–0.04)
TROPONIN I SERPL-MCNC: 1.77 UG/L (ref 0–0.04)
TROPONIN I SERPL-MCNC: 13.72 UG/L (ref 0–0.04)
TROPONIN I SERPL-MCNC: 22.14 UG/L (ref 0–0.04)
TROPONIN I SERPL-MCNC: 24.07 UG/L (ref 0–0.04)
TROPONIN I SERPL-MCNC: 27.43 UG/L (ref 0–0.04)
TROPONIN I SERPL-MCNC: 3.39 UG/L (ref 0–0.04)
TROPONIN I SERPL-MCNC: 4.07 UG/L (ref 0–0.04)
TROPONIN I SERPL-MCNC: 4.88 UG/L (ref 0–0.04)
TROPONIN I SERPL-MCNC: 9.66 UG/L (ref 0–0.04)
TROPONIN I SERPL-MCNC: 9.94 UG/L (ref 0–0.04)
WBC # BLD AUTO: 14.8 10E9/L (ref 4–11)
WBC # BLD AUTO: 5 10E9/L (ref 4–11)
WBC # BLD AUTO: 5.1 10E9/L (ref 4–11)
WBC # BLD AUTO: 5.2 10E9/L (ref 4–11)
WBC # BLD AUTO: 5.3 10E9/L (ref 4–11)
WBC # BLD AUTO: 5.4 10E9/L (ref 4–11)
WBC # BLD AUTO: 5.4 10E9/L (ref 4–11)
WBC # BLD AUTO: 5.5 10E9/L (ref 4–11)
WBC # BLD AUTO: 5.8 10E9/L (ref 4–11)
WBC # BLD AUTO: 5.8 10E9/L (ref 4–11)
WBC # BLD AUTO: 6 10E9/L (ref 4–11)
WBC # BLD AUTO: 6.1 10E9/L (ref 4–11)
WBC # BLD AUTO: 6.4 10E9/L (ref 4–11)
WBC # BLD AUTO: 6.4 10E9/L (ref 4–11)
WBC # BLD AUTO: 9.8 10E9/L (ref 4–11)

## 2020-01-01 PROCEDURE — 25000132 ZZH RX MED GY IP 250 OP 250 PS 637: Mod: GY | Performed by: PHYSICIAN ASSISTANT

## 2020-01-01 PROCEDURE — 83036 HEMOGLOBIN GLYCOSYLATED A1C: CPT | Performed by: EMERGENCY MEDICINE

## 2020-01-01 PROCEDURE — 25000132 ZZH RX MED GY IP 250 OP 250 PS 637: Mod: GY | Performed by: INTERNAL MEDICINE

## 2020-01-01 PROCEDURE — 83605 ASSAY OF LACTIC ACID: CPT | Performed by: INTERNAL MEDICINE

## 2020-01-01 PROCEDURE — 99233 SBSQ HOSP IP/OBS HIGH 50: CPT | Performed by: INTERNAL MEDICINE

## 2020-01-01 PROCEDURE — 25000128 H RX IP 250 OP 636: Performed by: INTERNAL MEDICINE

## 2020-01-01 PROCEDURE — 96372 THER/PROPH/DIAG INJ SC/IM: CPT | Mod: XS

## 2020-01-01 PROCEDURE — 85025 COMPLETE CBC W/AUTO DIFF WBC: CPT | Performed by: HOSPITALIST

## 2020-01-01 PROCEDURE — 85520 HEPARIN ASSAY: CPT | Performed by: INTERNAL MEDICINE

## 2020-01-01 PROCEDURE — 36415 COLL VENOUS BLD VENIPUNCTURE: CPT | Performed by: PHYSICIAN ASSISTANT

## 2020-01-01 PROCEDURE — 12000011 ZZH R&B MS OVERFLOW

## 2020-01-01 PROCEDURE — 71046 X-RAY EXAM CHEST 2 VIEWS: CPT

## 2020-01-01 PROCEDURE — 87040 BLOOD CULTURE FOR BACTERIA: CPT | Mod: 91 | Performed by: EMERGENCY MEDICINE

## 2020-01-01 PROCEDURE — 00000146 ZZHCL STATISTIC GLUCOSE BY METER IP

## 2020-01-01 PROCEDURE — 36415 COLL VENOUS BLD VENIPUNCTURE: CPT | Performed by: INTERNAL MEDICINE

## 2020-01-01 PROCEDURE — 25000132 ZZH RX MED GY IP 250 OP 250 PS 637: Mod: GY | Performed by: HOSPITALIST

## 2020-01-01 PROCEDURE — 85027 COMPLETE CBC AUTOMATED: CPT | Performed by: PHYSICIAN ASSISTANT

## 2020-01-01 PROCEDURE — G0378 HOSPITAL OBSERVATION PER HR: HCPCS

## 2020-01-01 PROCEDURE — 25800030 ZZH RX IP 258 OP 636: Performed by: INTERNAL MEDICINE

## 2020-01-01 PROCEDURE — 93010 ELECTROCARDIOGRAM REPORT: CPT | Performed by: INTERNAL MEDICINE

## 2020-01-01 PROCEDURE — 93571 IV DOP VEL&/PRESS C FLO 1ST: CPT | Mod: 26 | Performed by: INTERNAL MEDICINE

## 2020-01-01 PROCEDURE — G0257 UNSCHED DIALYSIS ESRD PT HOS: HCPCS

## 2020-01-01 PROCEDURE — 27210131 ZZH KIT ART LINE INSERTION

## 2020-01-01 PROCEDURE — 82805 BLOOD GASES W/O2 SATURATION: CPT | Performed by: INTERNAL MEDICINE

## 2020-01-01 PROCEDURE — 99220 ZZC INITIAL OBSERVATION CARE,LEVL III: CPT | Performed by: HOSPITALIST

## 2020-01-01 PROCEDURE — 93306 TTE W/DOPPLER COMPLETE: CPT

## 2020-01-01 PROCEDURE — 99291 CRITICAL CARE FIRST HOUR: CPT | Mod: 25

## 2020-01-01 PROCEDURE — 99232 SBSQ HOSP IP/OBS MODERATE 35: CPT | Performed by: HOSPITALIST

## 2020-01-01 PROCEDURE — B2111ZZ FLUOROSCOPY OF MULTIPLE CORONARY ARTERIES USING LOW OSMOLAR CONTRAST: ICD-10-PCS | Performed by: INTERNAL MEDICINE

## 2020-01-01 PROCEDURE — 25800030 ZZH RX IP 258 OP 636: Performed by: EMERGENCY MEDICINE

## 2020-01-01 PROCEDURE — 80048 BASIC METABOLIC PNL TOTAL CA: CPT | Performed by: EMERGENCY MEDICINE

## 2020-01-01 PROCEDURE — 99153 MOD SED SAME PHYS/QHP EA: CPT | Performed by: INTERNAL MEDICINE

## 2020-01-01 PROCEDURE — 99232 SBSQ HOSP IP/OBS MODERATE 35: CPT | Performed by: INTERNAL MEDICINE

## 2020-01-01 PROCEDURE — 80048 BASIC METABOLIC PNL TOTAL CA: CPT | Performed by: PHYSICIAN ASSISTANT

## 2020-01-01 PROCEDURE — 40000275 ZZH STATISTIC RCP TIME EA 10 MIN

## 2020-01-01 PROCEDURE — 84484 ASSAY OF TROPONIN QUANT: CPT | Performed by: INTERNAL MEDICINE

## 2020-01-01 PROCEDURE — 85610 PROTHROMBIN TIME: CPT | Performed by: EMERGENCY MEDICINE

## 2020-01-01 PROCEDURE — 78452 HT MUSCLE IMAGE SPECT MULT: CPT

## 2020-01-01 PROCEDURE — 99232 SBSQ HOSP IP/OBS MODERATE 35: CPT | Mod: 25 | Performed by: INTERNAL MEDICINE

## 2020-01-01 PROCEDURE — 36415 COLL VENOUS BLD VENIPUNCTURE: CPT | Performed by: EMERGENCY MEDICINE

## 2020-01-01 PROCEDURE — 99223 1ST HOSP IP/OBS HIGH 75: CPT | Mod: AI | Performed by: INTERNAL MEDICINE

## 2020-01-01 PROCEDURE — 90937 HEMODIALYSIS REPEATED EVAL: CPT

## 2020-01-01 PROCEDURE — 85025 COMPLETE CBC W/AUTO DIFF WBC: CPT | Performed by: EMERGENCY MEDICINE

## 2020-01-01 PROCEDURE — 99239 HOSP IP/OBS DSCHRG MGMT >30: CPT | Performed by: HOSPITALIST

## 2020-01-01 PROCEDURE — 99152 MOD SED SAME PHYS/QHP 5/>YRS: CPT | Mod: 59 | Performed by: INTERNAL MEDICINE

## 2020-01-01 PROCEDURE — 94640 AIRWAY INHALATION TREATMENT: CPT

## 2020-01-01 PROCEDURE — 84484 ASSAY OF TROPONIN QUANT: CPT | Performed by: EMERGENCY MEDICINE

## 2020-01-01 PROCEDURE — 85027 COMPLETE CBC AUTOMATED: CPT | Performed by: INTERNAL MEDICINE

## 2020-01-01 PROCEDURE — 93294 REM INTERROG EVL PM/LDLS PM: CPT | Performed by: INTERNAL MEDICINE

## 2020-01-01 PROCEDURE — 93306 TTE W/DOPPLER COMPLETE: CPT | Mod: 26 | Performed by: INTERNAL MEDICINE

## 2020-01-01 PROCEDURE — 99232 SBSQ HOSP IP/OBS MODERATE 35: CPT | Performed by: PHYSICIAN ASSISTANT

## 2020-01-01 PROCEDURE — 93018 CV STRESS TEST I&R ONLY: CPT | Performed by: INTERNAL MEDICINE

## 2020-01-01 PROCEDURE — 83880 ASSAY OF NATRIURETIC PEPTIDE: CPT | Performed by: EMERGENCY MEDICINE

## 2020-01-01 PROCEDURE — 80048 BASIC METABOLIC PNL TOTAL CA: CPT | Performed by: INTERNAL MEDICINE

## 2020-01-01 PROCEDURE — 25000132 ZZH RX MED GY IP 250 OP 250 PS 637: Mod: GY | Performed by: NURSE PRACTITIONER

## 2020-01-01 PROCEDURE — 99214 OFFICE O/P EST MOD 30 MIN: CPT | Performed by: INTERNAL MEDICINE

## 2020-01-01 PROCEDURE — 99207 ZZC CDG-CODE CATEGORY CHANGED: CPT | Performed by: HOSPITALIST

## 2020-01-01 PROCEDURE — 5A1D70Z PERFORMANCE OF URINARY FILTRATION, INTERMITTENT, LESS THAN 6 HOURS PER DAY: ICD-10-PCS | Performed by: INTERNAL MEDICINE

## 2020-01-01 PROCEDURE — 25000131 ZZH RX MED GY IP 250 OP 636 PS 637: Mod: GY | Performed by: PHYSICIAN ASSISTANT

## 2020-01-01 PROCEDURE — 96374 THER/PROPH/DIAG INJ IV PUSH: CPT

## 2020-01-01 PROCEDURE — 86850 RBC ANTIBODY SCREEN: CPT | Performed by: INTERNAL MEDICINE

## 2020-01-01 PROCEDURE — 96372 THER/PROPH/DIAG INJ SC/IM: CPT

## 2020-01-01 PROCEDURE — 82947 ASSAY GLUCOSE BLOOD QUANT: CPT | Mod: 91 | Performed by: HOSPITALIST

## 2020-01-01 PROCEDURE — 99291 CRITICAL CARE FIRST HOUR: CPT | Mod: 25 | Performed by: INTERNAL MEDICINE

## 2020-01-01 PROCEDURE — 83690 ASSAY OF LIPASE: CPT | Performed by: EMERGENCY MEDICINE

## 2020-01-01 PROCEDURE — 99152 MOD SED SAME PHYS/QHP 5/>YRS: CPT | Performed by: INTERNAL MEDICINE

## 2020-01-01 PROCEDURE — 83735 ASSAY OF MAGNESIUM: CPT | Performed by: EMERGENCY MEDICINE

## 2020-01-01 PROCEDURE — 12000000 ZZH R&B MED SURG/OB

## 2020-01-01 PROCEDURE — 93005 ELECTROCARDIOGRAM TRACING: CPT

## 2020-01-01 PROCEDURE — 25000132 ZZH RX MED GY IP 250 OP 250 PS 637: Mod: GY

## 2020-01-01 PROCEDURE — 20000003 ZZH R&B ICU

## 2020-01-01 PROCEDURE — 85520 HEPARIN ASSAY: CPT | Performed by: PHYSICIAN ASSISTANT

## 2020-01-01 PROCEDURE — 63400005 ZZH RX 634: Mod: EC | Performed by: INTERNAL MEDICINE

## 2020-01-01 PROCEDURE — 85025 COMPLETE CBC W/AUTO DIFF WBC: CPT | Performed by: INTERNAL MEDICINE

## 2020-01-01 PROCEDURE — 86900 BLOOD TYPING SEROLOGIC ABO: CPT | Performed by: EMERGENCY MEDICINE

## 2020-01-01 PROCEDURE — 80053 COMPREHEN METABOLIC PANEL: CPT | Performed by: EMERGENCY MEDICINE

## 2020-01-01 PROCEDURE — 36415 COLL VENOUS BLD VENIPUNCTURE: CPT | Performed by: HOSPITALIST

## 2020-01-01 PROCEDURE — 96365 THER/PROPH/DIAG IV INF INIT: CPT

## 2020-01-01 PROCEDURE — 76937 US GUIDE VASCULAR ACCESS: CPT

## 2020-01-01 PROCEDURE — 87635 SARS-COV-2 COVID-19 AMP PRB: CPT | Performed by: EMERGENCY MEDICINE

## 2020-01-01 PROCEDURE — 93016 CV STRESS TEST SUPVJ ONLY: CPT | Performed by: INTERNAL MEDICINE

## 2020-01-01 PROCEDURE — 96376 TX/PRO/DX INJ SAME DRUG ADON: CPT

## 2020-01-01 PROCEDURE — 96360 HYDRATION IV INFUSION INIT: CPT | Mod: 59

## 2020-01-01 PROCEDURE — 40000281 ZZH STATISTIC TRANSPORT TIME EA 15 MIN

## 2020-01-01 PROCEDURE — 25000131 ZZH RX MED GY IP 250 OP 636 PS 637: Mod: GY | Performed by: INTERNAL MEDICINE

## 2020-01-01 PROCEDURE — 71045 X-RAY EXAM CHEST 1 VIEW: CPT

## 2020-01-01 PROCEDURE — 93017 CV STRESS TEST TRACING ONLY: CPT

## 2020-01-01 PROCEDURE — 99207 ZZC CDG-CODE CATEGORY CHANGED: CPT | Performed by: INTERNAL MEDICINE

## 2020-01-01 PROCEDURE — C1769 GUIDE WIRE: HCPCS | Performed by: INTERNAL MEDICINE

## 2020-01-01 PROCEDURE — 84484 ASSAY OF TROPONIN QUANT: CPT | Performed by: PHYSICIAN ASSISTANT

## 2020-01-01 PROCEDURE — 85379 FIBRIN DEGRADATION QUANT: CPT | Performed by: EMERGENCY MEDICINE

## 2020-01-01 PROCEDURE — 93296 REM INTERROG EVL PM/IDS: CPT | Performed by: INTERNAL MEDICINE

## 2020-01-01 PROCEDURE — 87641 MR-STAPH DNA AMP PROBE: CPT | Performed by: INTERNAL MEDICINE

## 2020-01-01 PROCEDURE — 99223 1ST HOSP IP/OBS HIGH 75: CPT | Performed by: INTERNAL MEDICINE

## 2020-01-01 PROCEDURE — 27210794 ZZH OR GENERAL SUPPLY STERILE: Performed by: INTERNAL MEDICINE

## 2020-01-01 PROCEDURE — 99239 HOSP IP/OBS DSCHRG MGMT >30: CPT | Performed by: INTERNAL MEDICINE

## 2020-01-01 PROCEDURE — 63400005 ZZH RX 634: Performed by: INTERNAL MEDICINE

## 2020-01-01 PROCEDURE — 99285 EMERGENCY DEPT VISIT HI MDM: CPT | Mod: 25

## 2020-01-01 PROCEDURE — 82805 BLOOD GASES W/O2 SATURATION: CPT | Performed by: EMERGENCY MEDICINE

## 2020-01-01 PROCEDURE — 93308 TTE F-UP OR LMTD: CPT | Mod: 26 | Performed by: INTERNAL MEDICINE

## 2020-01-01 PROCEDURE — 40000274 ZZH STATISTIC RCP CONSULT EA 30 MIN

## 2020-01-01 PROCEDURE — 25000128 H RX IP 250 OP 636: Performed by: EMERGENCY MEDICINE

## 2020-01-01 PROCEDURE — 93005 ELECTROCARDIOGRAM TRACING: CPT | Mod: 76

## 2020-01-01 PROCEDURE — 25000132 ZZH RX MED GY IP 250 OP 250 PS 637: Mod: GY | Performed by: EMERGENCY MEDICINE

## 2020-01-01 PROCEDURE — 80048 BASIC METABOLIC PNL TOTAL CA: CPT | Performed by: HOSPITALIST

## 2020-01-01 PROCEDURE — 86900 BLOOD TYPING SEROLOGIC ABO: CPT | Performed by: INTERNAL MEDICINE

## 2020-01-01 PROCEDURE — 25000125 ZZHC RX 250: Performed by: INTERNAL MEDICINE

## 2020-01-01 PROCEDURE — P9016 RBC LEUKOCYTES REDUCED: HCPCS | Performed by: INTERNAL MEDICINE

## 2020-01-01 PROCEDURE — 99217 ZZC OBSERVATION CARE DISCHARGE: CPT | Performed by: HOSPITALIST

## 2020-01-01 PROCEDURE — 36600 WITHDRAWAL OF ARTERIAL BLOOD: CPT

## 2020-01-01 PROCEDURE — 93321 DOPPLER ECHO F-UP/LMTD STD: CPT | Mod: 26 | Performed by: INTERNAL MEDICINE

## 2020-01-01 PROCEDURE — 84484 ASSAY OF TROPONIN QUANT: CPT | Mod: 91 | Performed by: HOSPITALIST

## 2020-01-01 PROCEDURE — 93458 L HRT ARTERY/VENTRICLE ANGIO: CPT | Mod: 26 | Performed by: INTERNAL MEDICINE

## 2020-01-01 PROCEDURE — 99233 SBSQ HOSP IP/OBS HIGH 50: CPT | Mod: 25 | Performed by: INTERNAL MEDICINE

## 2020-01-01 PROCEDURE — 99443 ZZC PHYSICIAN TELEPHONE EVALUATION 21-30 MIN: CPT | Mod: 25 | Performed by: NURSE PRACTITIONER

## 2020-01-01 PROCEDURE — 25000128 H RX IP 250 OP 636: Performed by: PHYSICIAN ASSISTANT

## 2020-01-01 PROCEDURE — 25000131 ZZH RX MED GY IP 250 OP 636 PS 637: Mod: GY | Performed by: EMERGENCY MEDICINE

## 2020-01-01 PROCEDURE — 93454 CORONARY ARTERY ANGIO S&I: CPT | Performed by: INTERNAL MEDICINE

## 2020-01-01 PROCEDURE — 85520 HEPARIN ASSAY: CPT | Performed by: EMERGENCY MEDICINE

## 2020-01-01 PROCEDURE — 87640 STAPH A DNA AMP PROBE: CPT | Performed by: INTERNAL MEDICINE

## 2020-01-01 PROCEDURE — 99222 1ST HOSP IP/OBS MODERATE 55: CPT | Mod: AI | Performed by: INTERNAL MEDICINE

## 2020-01-01 PROCEDURE — 74176 CT ABD & PELVIS W/O CONTRAST: CPT

## 2020-01-01 PROCEDURE — 99220 ZZC INITIAL OBSERVATION CARE,LEVL III: CPT | Performed by: PHYSICIAN ASSISTANT

## 2020-01-01 PROCEDURE — 34300033 ZZH RX 343: Performed by: HOSPITALIST

## 2020-01-01 PROCEDURE — 99221 1ST HOSP IP/OBS SF/LOW 40: CPT | Performed by: INTERNAL MEDICINE

## 2020-01-01 PROCEDURE — 86901 BLOOD TYPING SEROLOGIC RH(D): CPT | Performed by: EMERGENCY MEDICINE

## 2020-01-01 PROCEDURE — 93571 IV DOP VEL&/PRESS C FLO 1ST: CPT | Performed by: INTERNAL MEDICINE

## 2020-01-01 PROCEDURE — 40000141 ZZH STATISTIC PERIPHERAL IV START W/O US GUIDANCE

## 2020-01-01 PROCEDURE — 84484 ASSAY OF TROPONIN QUANT: CPT

## 2020-01-01 PROCEDURE — 86850 RBC ANTIBODY SCREEN: CPT | Performed by: EMERGENCY MEDICINE

## 2020-01-01 PROCEDURE — 87040 BLOOD CULTURE FOR BACTERIA: CPT | Performed by: HOSPITALIST

## 2020-01-01 PROCEDURE — 93325 DOPPLER ECHO COLOR FLOW MAPG: CPT | Mod: 26 | Performed by: INTERNAL MEDICINE

## 2020-01-01 PROCEDURE — 86901 BLOOD TYPING SEROLOGIC RH(D): CPT | Performed by: INTERNAL MEDICINE

## 2020-01-01 PROCEDURE — 78452 HT MUSCLE IMAGE SPECT MULT: CPT | Mod: 26 | Performed by: INTERNAL MEDICINE

## 2020-01-01 PROCEDURE — 99207 ZZC APP CREDIT; MD BILLING SHARED VISIT: CPT | Performed by: PHYSICIAN ASSISTANT

## 2020-01-01 PROCEDURE — 36556 INSERT NON-TUNNEL CV CATH: CPT

## 2020-01-01 PROCEDURE — 85347 COAGULATION TIME ACTIVATED: CPT

## 2020-01-01 PROCEDURE — 93325 DOPPLER ECHO COLOR FLOW MAPG: CPT

## 2020-01-01 PROCEDURE — 4A033BC MEASUREMENT OF ARTERIAL PRESSURE, CORONARY, PERCUTANEOUS APPROACH: ICD-10-PCS | Performed by: INTERNAL MEDICINE

## 2020-01-01 PROCEDURE — 84145 PROCALCITONIN (PCT): CPT | Performed by: EMERGENCY MEDICINE

## 2020-01-01 PROCEDURE — 86923 COMPATIBILITY TEST ELECTRIC: CPT | Performed by: INTERNAL MEDICINE

## 2020-01-01 PROCEDURE — 4A023N7 MEASUREMENT OF CARDIAC SAMPLING AND PRESSURE, LEFT HEART, PERCUTANEOUS APPROACH: ICD-10-PCS | Performed by: INTERNAL MEDICINE

## 2020-01-01 PROCEDURE — 93005 ELECTROCARDIOGRAM TRACING: CPT | Mod: 59

## 2020-01-01 PROCEDURE — 83605 ASSAY OF LACTIC ACID: CPT | Performed by: EMERGENCY MEDICINE

## 2020-01-01 PROCEDURE — 25000128 H RX IP 250 OP 636

## 2020-01-01 PROCEDURE — A9502 TC99M TETROFOSMIN: HCPCS | Performed by: HOSPITALIST

## 2020-01-01 PROCEDURE — 94660 CPAP INITIATION&MGMT: CPT

## 2020-01-01 RX ORDER — DAPSONE 100 MG/1
100 TABLET ORAL AT BEDTIME
Status: DISCONTINUED | OUTPATIENT
Start: 2020-01-01 | End: 2020-01-01 | Stop reason: HOSPADM

## 2020-01-01 RX ORDER — LIDOCAINE 40 MG/G
CREAM TOPICAL
Status: DISCONTINUED | OUTPATIENT
Start: 2020-01-01 | End: 2020-01-01 | Stop reason: HOSPADM

## 2020-01-01 RX ORDER — RITONAVIR 100 MG/1
100 TABLET ORAL AT BEDTIME
Status: DISCONTINUED | OUTPATIENT
Start: 2020-01-01 | End: 2020-01-01 | Stop reason: HOSPADM

## 2020-01-01 RX ORDER — ISOSORBIDE MONONITRATE 60 MG/1
120 TABLET, EXTENDED RELEASE ORAL EVERY EVENING
Status: DISCONTINUED | OUTPATIENT
Start: 2020-01-01 | End: 2020-01-01 | Stop reason: HOSPADM

## 2020-01-01 RX ORDER — NITROGLYCERIN 0.4 MG/1
0.4 TABLET SUBLINGUAL EVERY 5 MIN PRN
Status: DISCONTINUED | OUTPATIENT
Start: 2020-01-01 | End: 2020-01-01

## 2020-01-01 RX ORDER — HEPARIN SODIUM 1000 [USP'U]/ML
500 INJECTION, SOLUTION INTRAVENOUS; SUBCUTANEOUS CONTINUOUS
Status: DISCONTINUED | OUTPATIENT
Start: 2020-01-01 | End: 2020-01-01

## 2020-01-01 RX ORDER — HEPARIN SODIUM 1000 [USP'U]/ML
INJECTION, SOLUTION INTRAVENOUS; SUBCUTANEOUS
Status: DISCONTINUED
Start: 2020-01-01 | End: 2020-01-01 | Stop reason: HOSPADM

## 2020-01-01 RX ORDER — FENTANYL CITRATE 50 UG/ML
INJECTION, SOLUTION INTRAMUSCULAR; INTRAVENOUS
Status: DISCONTINUED
Start: 2020-01-01 | End: 2020-01-01 | Stop reason: HOSPADM

## 2020-01-01 RX ORDER — CARVEDILOL 3.12 MG/1
9.38 TABLET ORAL 2 TIMES DAILY WITH MEALS
Qty: 180 TABLET | Refills: 0 | Status: ON HOLD | OUTPATIENT
Start: 2020-01-01 | End: 2020-01-01

## 2020-01-01 RX ORDER — CLONAZEPAM 0.5 MG/1
0.5 TABLET ORAL
Status: DISCONTINUED | OUTPATIENT
Start: 2020-01-01 | End: 2020-01-01 | Stop reason: HOSPADM

## 2020-01-01 RX ORDER — ALBUMIN (HUMAN) 12.5 G/50ML
50 SOLUTION INTRAVENOUS
Status: DISCONTINUED | OUTPATIENT
Start: 2020-01-01 | End: 2020-01-01 | Stop reason: HOSPADM

## 2020-01-01 RX ORDER — ABACAVIR 300 MG/1
600 TABLET ORAL EVERY EVENING
Status: DISCONTINUED | OUTPATIENT
Start: 2020-01-01 | End: 2020-01-01 | Stop reason: HOSPADM

## 2020-01-01 RX ORDER — ASPIRIN 81 MG/1
81 TABLET ORAL EVERY EVENING
Status: DISCONTINUED | OUTPATIENT
Start: 2020-01-01 | End: 2020-01-01 | Stop reason: HOSPADM

## 2020-01-01 RX ORDER — ATORVASTATIN CALCIUM 40 MG/1
40 TABLET, FILM COATED ORAL AT BEDTIME
Status: DISCONTINUED | OUTPATIENT
Start: 2020-01-01 | End: 2020-01-01 | Stop reason: HOSPADM

## 2020-01-01 RX ORDER — MAGNESIUM OXIDE 400 MG/1
400 TABLET ORAL AT BEDTIME
Status: DISCONTINUED | OUTPATIENT
Start: 2020-01-01 | End: 2020-01-01 | Stop reason: HOSPADM

## 2020-01-01 RX ORDER — POLYETHYLENE GLYCOL 3350 17 G/17G
17 POWDER, FOR SOLUTION ORAL DAILY PRN
Status: DISCONTINUED | OUTPATIENT
Start: 2020-01-01 | End: 2020-01-01 | Stop reason: ALTCHOICE

## 2020-01-01 RX ORDER — ACETAMINOPHEN 325 MG/1
650 TABLET ORAL EVERY 4 HOURS PRN
Status: DISCONTINUED | OUTPATIENT
Start: 2020-01-01 | End: 2020-01-01 | Stop reason: HOSPADM

## 2020-01-01 RX ORDER — ONDANSETRON 4 MG/1
4 TABLET, ORALLY DISINTEGRATING ORAL EVERY 6 HOURS PRN
Status: DISCONTINUED | OUTPATIENT
Start: 2020-01-01 | End: 2020-01-01 | Stop reason: HOSPADM

## 2020-01-01 RX ORDER — CLOPIDOGREL BISULFATE 75 MG/1
300 TABLET ORAL ONCE
Status: COMPLETED | OUTPATIENT
Start: 2020-01-01 | End: 2020-01-01

## 2020-01-01 RX ORDER — CLOPIDOGREL BISULFATE 75 MG/1
75 TABLET ORAL DAILY
Status: DISCONTINUED | OUTPATIENT
Start: 2020-01-01 | End: 2020-01-01 | Stop reason: HOSPADM

## 2020-01-01 RX ORDER — CARVEDILOL 6.25 MG/1
6.25 TABLET ORAL 2 TIMES DAILY WITH MEALS
Status: DISCONTINUED | OUTPATIENT
Start: 2020-01-01 | End: 2020-01-01

## 2020-01-01 RX ORDER — NITROGLYCERIN 0.4 MG/1
0.4 TABLET SUBLINGUAL ONCE
Status: COMPLETED | OUTPATIENT
Start: 2020-01-01 | End: 2020-01-01

## 2020-01-01 RX ORDER — AMOXICILLIN 250 MG
1 CAPSULE ORAL 2 TIMES DAILY PRN
Status: DISCONTINUED | OUTPATIENT
Start: 2020-01-01 | End: 2020-01-01 | Stop reason: HOSPADM

## 2020-01-01 RX ORDER — CLOPIDOGREL BISULFATE 75 MG/1
75 TABLET ORAL DAILY
Status: ON HOLD | COMMUNITY
End: 2020-01-01

## 2020-01-01 RX ORDER — CINACALCET 30 MG/1
30 TABLET, FILM COATED ORAL
Status: DISCONTINUED | OUTPATIENT
Start: 2020-01-01 | End: 2020-01-01 | Stop reason: HOSPADM

## 2020-01-01 RX ORDER — LIDOCAINE 40 MG/G
CREAM TOPICAL
Status: DISCONTINUED | OUTPATIENT
Start: 2020-01-01 | End: 2020-01-01

## 2020-01-01 RX ORDER — HEPARIN SODIUM 1000 [USP'U]/ML
500 INJECTION, SOLUTION INTRAVENOUS; SUBCUTANEOUS
Status: DISCONTINUED | OUTPATIENT
Start: 2020-01-01 | End: 2020-01-01 | Stop reason: DRUGHIGH

## 2020-01-01 RX ORDER — SODIUM CHLORIDE 9 MG/ML
INJECTION, SOLUTION INTRAVENOUS CONTINUOUS
Status: CANCELLED | OUTPATIENT
Start: 2020-01-01

## 2020-01-01 RX ORDER — PANTOPRAZOLE SODIUM 40 MG/1
40 TABLET, DELAYED RELEASE ORAL DAILY
Status: DISCONTINUED | OUTPATIENT
Start: 2020-01-01 | End: 2020-01-01 | Stop reason: HOSPADM

## 2020-01-01 RX ORDER — DEXTROSE MONOHYDRATE 25 G/50ML
25-50 INJECTION, SOLUTION INTRAVENOUS
Status: DISCONTINUED | OUTPATIENT
Start: 2020-01-01 | End: 2020-01-01 | Stop reason: HOSPADM

## 2020-01-01 RX ORDER — CARVEDILOL 3.12 MG/1
3.12 TABLET ORAL 2 TIMES DAILY WITH MEALS
Status: DISCONTINUED | OUTPATIENT
Start: 2020-01-01 | End: 2020-01-01 | Stop reason: HOSPADM

## 2020-01-01 RX ORDER — IOPAMIDOL 755 MG/ML
INJECTION, SOLUTION INTRAVASCULAR
Status: DISCONTINUED | OUTPATIENT
Start: 2020-01-01 | End: 2020-01-01 | Stop reason: HOSPADM

## 2020-01-01 RX ORDER — HEPARIN SODIUM 1000 [USP'U]/ML
500 INJECTION, SOLUTION INTRAVENOUS; SUBCUTANEOUS
Status: DISCONTINUED | OUTPATIENT
Start: 2020-01-01 | End: 2020-01-01

## 2020-01-01 RX ORDER — AMOXICILLIN 250 MG
2 CAPSULE ORAL 2 TIMES DAILY PRN
Status: DISCONTINUED | OUTPATIENT
Start: 2020-01-01 | End: 2020-01-01 | Stop reason: HOSPADM

## 2020-01-01 RX ORDER — ALBUTEROL SULFATE 90 UG/1
2 AEROSOL, METERED RESPIRATORY (INHALATION) EVERY 6 HOURS PRN
Status: DISCONTINUED | OUTPATIENT
Start: 2020-01-01 | End: 2020-01-01 | Stop reason: HOSPADM

## 2020-01-01 RX ORDER — GABAPENTIN 300 MG/1
300 CAPSULE ORAL 2 TIMES DAILY
Status: DISCONTINUED | OUTPATIENT
Start: 2020-01-01 | End: 2020-01-01 | Stop reason: HOSPADM

## 2020-01-01 RX ORDER — NALOXONE HYDROCHLORIDE 0.4 MG/ML
.1-.4 INJECTION, SOLUTION INTRAMUSCULAR; INTRAVENOUS; SUBCUTANEOUS
Status: DISCONTINUED | OUTPATIENT
Start: 2020-01-01 | End: 2020-01-01 | Stop reason: HOSPADM

## 2020-01-01 RX ORDER — ACETAMINOPHEN 650 MG/1
650 SUPPOSITORY RECTAL EVERY 4 HOURS PRN
Status: DISCONTINUED | OUTPATIENT
Start: 2020-01-01 | End: 2020-01-01 | Stop reason: HOSPADM

## 2020-01-01 RX ORDER — CARVEDILOL 12.5 MG/1
12.5 TABLET ORAL 2 TIMES DAILY WITH MEALS
Status: DISCONTINUED | OUTPATIENT
Start: 2020-01-01 | End: 2020-01-01 | Stop reason: HOSPADM

## 2020-01-01 RX ORDER — NITROGLYCERIN 20 MG/100ML
0.07-2 INJECTION INTRAVENOUS CONTINUOUS
Status: DISCONTINUED | OUTPATIENT
Start: 2020-01-01 | End: 2020-01-01

## 2020-01-01 RX ORDER — ATROPINE SULFATE 0.1 MG/ML
0.5 INJECTION INTRAVENOUS EVERY 5 MIN PRN
Status: ACTIVE | OUTPATIENT
Start: 2020-01-01 | End: 2020-01-01

## 2020-01-01 RX ORDER — LIDOCAINE 40 MG/G
CREAM TOPICAL
Status: CANCELLED | OUTPATIENT
Start: 2020-01-01

## 2020-01-01 RX ORDER — ACETAMINOPHEN 500 MG
1000 TABLET ORAL ONCE
Status: COMPLETED | OUTPATIENT
Start: 2020-01-01 | End: 2020-01-01

## 2020-01-01 RX ORDER — ACETAMINOPHEN 650 MG/1
650 SUPPOSITORY RECTAL EVERY 4 HOURS PRN
Status: DISCONTINUED | OUTPATIENT
Start: 2020-01-01 | End: 2020-01-01

## 2020-01-01 RX ORDER — ASPIRIN 81 MG/1
81 TABLET ORAL DAILY
Status: DISCONTINUED | OUTPATIENT
Start: 2020-01-01 | End: 2020-01-01 | Stop reason: HOSPADM

## 2020-01-01 RX ORDER — NITROGLYCERIN 0.4 MG/1
TABLET SUBLINGUAL
Status: COMPLETED
Start: 2020-01-01 | End: 2020-01-01

## 2020-01-01 RX ORDER — HYDROMORPHONE HYDROCHLORIDE 1 MG/ML
0.2 INJECTION, SOLUTION INTRAMUSCULAR; INTRAVENOUS; SUBCUTANEOUS ONCE
Status: COMPLETED | OUTPATIENT
Start: 2020-01-01 | End: 2020-01-01

## 2020-01-01 RX ORDER — FUROSEMIDE 40 MG
40 TABLET ORAL
Status: DISCONTINUED | OUTPATIENT
Start: 2020-01-01 | End: 2020-01-01 | Stop reason: HOSPADM

## 2020-01-01 RX ORDER — IRBESARTAN 150 MG/1
300 TABLET ORAL AT BEDTIME
Status: DISCONTINUED | OUTPATIENT
Start: 2020-01-01 | End: 2020-01-01 | Stop reason: HOSPADM

## 2020-01-01 RX ORDER — POLYETHYLENE GLYCOL 3350 17 G/17G
17 POWDER, FOR SOLUTION ORAL DAILY PRN
Status: DISCONTINUED | OUTPATIENT
Start: 2020-01-01 | End: 2020-01-01 | Stop reason: HOSPADM

## 2020-01-01 RX ORDER — PROPOFOL 10 MG/ML
INJECTION, EMULSION INTRAVENOUS
Status: DISCONTINUED
Start: 2020-01-01 | End: 2020-01-01 | Stop reason: WASHOUT

## 2020-01-01 RX ORDER — CLOPIDOGREL BISULFATE 75 MG/1
75 TABLET ORAL DAILY
Qty: 30 TABLET | Refills: 1 | Status: SHIPPED | OUTPATIENT
Start: 2020-01-01

## 2020-01-01 RX ORDER — HEPARIN SODIUM 10000 [USP'U]/100ML
1350 INJECTION, SOLUTION INTRAVENOUS CONTINUOUS
Status: DISCONTINUED | OUTPATIENT
Start: 2020-01-01 | End: 2020-01-01 | Stop reason: HOSPADM

## 2020-01-01 RX ORDER — NITROGLYCERIN 0.4 MG/1
0.4 TABLET SUBLINGUAL EVERY 5 MIN PRN
Status: DISCONTINUED | OUTPATIENT
Start: 2020-01-01 | End: 2020-01-01 | Stop reason: HOSPADM

## 2020-01-01 RX ORDER — BISACODYL 10 MG
10 SUPPOSITORY, RECTAL RECTAL DAILY PRN
Status: DISCONTINUED | OUTPATIENT
Start: 2020-01-01 | End: 2020-01-01 | Stop reason: HOSPADM

## 2020-01-01 RX ORDER — INSULIN ASPART 100 [IU]/ML
INJECTION, SOLUTION INTRAVENOUS; SUBCUTANEOUS
COMMUNITY

## 2020-01-01 RX ORDER — CLOPIDOGREL BISULFATE 75 MG/1
150 TABLET ORAL ONCE
Status: COMPLETED | OUTPATIENT
Start: 2020-01-01 | End: 2020-01-01

## 2020-01-01 RX ORDER — CARVEDILOL 3.12 MG/1
6.25 TABLET ORAL 2 TIMES DAILY WITH MEALS
Qty: 180 TABLET | Refills: 0 | COMMUNITY
Start: 2020-01-01

## 2020-01-01 RX ORDER — ISOSORBIDE MONONITRATE 30 MG/1
30 TABLET, EXTENDED RELEASE ORAL DAILY
Status: DISCONTINUED | OUTPATIENT
Start: 2020-01-01 | End: 2020-01-01 | Stop reason: HOSPADM

## 2020-01-01 RX ORDER — NICOTINE POLACRILEX 4 MG
15-30 LOZENGE BUCCAL
Status: DISCONTINUED | OUTPATIENT
Start: 2020-01-01 | End: 2020-01-01 | Stop reason: HOSPADM

## 2020-01-01 RX ORDER — MIRTAZAPINE 15 MG/1
15 TABLET, FILM COATED ORAL AT BEDTIME
Status: DISCONTINUED | OUTPATIENT
Start: 2020-01-01 | End: 2020-01-01 | Stop reason: HOSPADM

## 2020-01-01 RX ORDER — ASPIRIN 325 MG
325 TABLET ORAL DAILY
Status: DISCONTINUED | OUTPATIENT
Start: 2020-01-01 | End: 2020-01-01 | Stop reason: DRUGHIGH

## 2020-01-01 RX ORDER — NALOXONE HYDROCHLORIDE 0.4 MG/ML
.2-.4 INJECTION, SOLUTION INTRAMUSCULAR; INTRAVENOUS; SUBCUTANEOUS
Status: ACTIVE | OUTPATIENT
Start: 2020-01-01 | End: 2020-01-01

## 2020-01-01 RX ORDER — ONDANSETRON 2 MG/ML
4 INJECTION INTRAMUSCULAR; INTRAVENOUS EVERY 6 HOURS PRN
Status: DISCONTINUED | OUTPATIENT
Start: 2020-01-01 | End: 2020-01-01 | Stop reason: HOSPADM

## 2020-01-01 RX ORDER — ALBUMIN, HUMAN INJ 5% 5 %
250 SOLUTION INTRAVENOUS
Status: DISCONTINUED | OUTPATIENT
Start: 2020-01-01 | End: 2020-01-01

## 2020-01-01 RX ORDER — ALBUMIN, HUMAN INJ 5% 5 %
250 SOLUTION INTRAVENOUS
Status: DISCONTINUED | OUTPATIENT
Start: 2020-01-01 | End: 2020-01-01 | Stop reason: HOSPADM

## 2020-01-01 RX ORDER — IPRATROPIUM BROMIDE AND ALBUTEROL SULFATE 2.5; .5 MG/3ML; MG/3ML
1 SOLUTION RESPIRATORY (INHALATION) EVERY 6 HOURS PRN
Status: DISCONTINUED | OUTPATIENT
Start: 2020-01-01 | End: 2020-01-01 | Stop reason: HOSPADM

## 2020-01-01 RX ORDER — LIDOCAINE HYDROCHLORIDE 10 MG/ML
INJECTION, SOLUTION EPIDURAL; INFILTRATION; INTRACAUDAL; PERINEURAL
Status: DISCONTINUED
Start: 2020-01-01 | End: 2020-01-01 | Stop reason: HOSPADM

## 2020-01-01 RX ORDER — HYDROXYZINE HYDROCHLORIDE 25 MG/1
25 TABLET, FILM COATED ORAL EVERY 6 HOURS PRN
Status: DISCONTINUED | OUTPATIENT
Start: 2020-01-01 | End: 2020-01-01 | Stop reason: HOSPADM

## 2020-01-01 RX ORDER — ASPIRIN 81 MG/1
81 TABLET, CHEWABLE ORAL DAILY
Status: DISCONTINUED | OUTPATIENT
Start: 2020-01-01 | End: 2020-01-01

## 2020-01-01 RX ORDER — HEPARIN SODIUM 1000 [USP'U]/ML
500 INJECTION, SOLUTION INTRAVENOUS; SUBCUTANEOUS
Status: CANCELLED | OUTPATIENT
Start: 2020-01-01 | End: 2020-01-01

## 2020-01-01 RX ORDER — FENTANYL CITRATE 50 UG/ML
INJECTION, SOLUTION INTRAMUSCULAR; INTRAVENOUS
Status: DISCONTINUED | OUTPATIENT
Start: 2020-01-01 | End: 2020-01-01 | Stop reason: HOSPADM

## 2020-01-01 RX ORDER — ALBUTEROL SULFATE 0.83 MG/ML
2.5 SOLUTION RESPIRATORY (INHALATION)
Status: DISCONTINUED | OUTPATIENT
Start: 2020-01-01 | End: 2020-01-01 | Stop reason: HOSPADM

## 2020-01-01 RX ORDER — ONDANSETRON 4 MG/1
4 TABLET, ORALLY DISINTEGRATING ORAL EVERY 8 HOURS PRN
COMMUNITY

## 2020-01-01 RX ORDER — SENNOSIDES 8.6 MG
1-2 TABLET ORAL 2 TIMES DAILY PRN
Status: DISCONTINUED | OUTPATIENT
Start: 2020-01-01 | End: 2020-01-01 | Stop reason: HOSPADM

## 2020-01-01 RX ORDER — NITROGLYCERIN 0.4 MG/1
0.4 TABLET SUBLINGUAL EVERY 5 MIN PRN
Status: DISCONTINUED | OUTPATIENT
Start: 2020-01-01 | End: 2020-01-01 | Stop reason: CLARIF

## 2020-01-01 RX ORDER — IRBESARTAN 75 MG/1
300 TABLET ORAL AT BEDTIME
Status: DISCONTINUED | OUTPATIENT
Start: 2020-01-01 | End: 2020-01-01 | Stop reason: HOSPADM

## 2020-01-01 RX ORDER — SODIUM CHLORIDE 9 MG/ML
INJECTION, SOLUTION INTRAVENOUS CONTINUOUS
Status: DISCONTINUED | OUTPATIENT
Start: 2020-01-01 | End: 2020-01-01

## 2020-01-01 RX ORDER — HEPARIN SODIUM 10000 [USP'U]/100ML
1200 INJECTION, SOLUTION INTRAVENOUS CONTINUOUS
Status: DISCONTINUED | OUTPATIENT
Start: 2020-01-01 | End: 2020-01-01

## 2020-01-01 RX ORDER — NALOXONE HYDROCHLORIDE 0.4 MG/ML
.1-.4 INJECTION, SOLUTION INTRAMUSCULAR; INTRAVENOUS; SUBCUTANEOUS
Status: DISCONTINUED | OUTPATIENT
Start: 2020-01-01 | End: 2020-01-01

## 2020-01-01 RX ORDER — NITROGLYCERIN 5 MG/ML
VIAL (ML) INTRAVENOUS
Status: DISCONTINUED
Start: 2020-01-01 | End: 2020-01-01 | Stop reason: HOSPADM

## 2020-01-01 RX ORDER — HEPARIN SODIUM 1000 [USP'U]/ML
500 INJECTION, SOLUTION INTRAVENOUS; SUBCUTANEOUS CONTINUOUS
Status: DISCONTINUED | OUTPATIENT
Start: 2020-01-01 | End: 2020-01-01 | Stop reason: HOSPADM

## 2020-01-01 RX ORDER — MORPHINE SULFATE 4 MG/ML
1 INJECTION, SOLUTION INTRAMUSCULAR; INTRAVENOUS
Status: DISCONTINUED | OUTPATIENT
Start: 2020-01-01 | End: 2020-01-01

## 2020-01-01 RX ORDER — REGADENOSON 0.08 MG/ML
INJECTION, SOLUTION INTRAVENOUS
Status: COMPLETED
Start: 2020-01-01 | End: 2020-01-01

## 2020-01-01 RX ORDER — POTASSIUM CHLORIDE 1500 MG/1
20 TABLET, EXTENDED RELEASE ORAL
Status: CANCELLED | OUTPATIENT
Start: 2020-01-01

## 2020-01-01 RX ORDER — CLONIDINE HYDROCHLORIDE 0.1 MG/1
0.1 TABLET ORAL DAILY PRN
Status: DISCONTINUED | OUTPATIENT
Start: 2020-01-01 | End: 2020-01-01 | Stop reason: HOSPADM

## 2020-01-01 RX ORDER — FLUMAZENIL 0.1 MG/ML
0.2 INJECTION, SOLUTION INTRAVENOUS
Status: ACTIVE | OUTPATIENT
Start: 2020-01-01 | End: 2020-01-01

## 2020-01-01 RX ORDER — AMINOPHYLLINE 25 MG/ML
INJECTION, SOLUTION INTRAVENOUS
Status: DISCONTINUED
Start: 2020-01-01 | End: 2020-01-01 | Stop reason: WASHOUT

## 2020-01-01 RX ORDER — POLYETHYLENE GLYCOL 3350 17 G/17G
17 POWDER, FOR SOLUTION ORAL 2 TIMES DAILY PRN
Status: DISCONTINUED | OUTPATIENT
Start: 2020-01-01 | End: 2020-01-01 | Stop reason: HOSPADM

## 2020-01-01 RX ORDER — ALBUMIN (HUMAN) 12.5 G/50ML
50 SOLUTION INTRAVENOUS
Status: DISCONTINUED | OUTPATIENT
Start: 2020-01-01 | End: 2020-01-01

## 2020-01-01 RX ORDER — ALBUMIN, HUMAN INJ 5% 5 %
250 SOLUTION INTRAVENOUS
Status: CANCELLED | OUTPATIENT
Start: 2020-01-01

## 2020-01-01 RX ORDER — HEPARIN SODIUM 1000 [USP'U]/ML
500 INJECTION, SOLUTION INTRAVENOUS; SUBCUTANEOUS CONTINUOUS
Status: CANCELLED | OUTPATIENT
Start: 2020-01-01

## 2020-01-01 RX ORDER — ISOSORBIDE MONONITRATE 30 MG/1
30 TABLET, EXTENDED RELEASE ORAL DAILY
Qty: 30 TABLET | Refills: 0 | Status: SHIPPED | OUTPATIENT
Start: 2020-01-01

## 2020-01-01 RX ORDER — MIDODRINE HYDROCHLORIDE 5 MG/1
10 TABLET ORAL ONCE
Status: COMPLETED | OUTPATIENT
Start: 2020-01-01 | End: 2020-01-01

## 2020-01-01 RX ORDER — HEPARIN SODIUM 10000 [USP'U]/100ML
1000 INJECTION, SOLUTION INTRAVENOUS CONTINUOUS
Status: DISCONTINUED | OUTPATIENT
Start: 2020-01-01 | End: 2020-01-01

## 2020-01-01 RX ORDER — FUROSEMIDE 40 MG
40 TABLET ORAL SEE ADMIN INSTRUCTIONS
Qty: 60 TABLET | Refills: 1 | Status: SHIPPED | OUTPATIENT
Start: 2020-01-01

## 2020-01-01 RX ORDER — FUROSEMIDE 10 MG/ML
80 INJECTION INTRAMUSCULAR; INTRAVENOUS ONCE
Status: COMPLETED | OUTPATIENT
Start: 2020-01-01 | End: 2020-01-01

## 2020-01-01 RX ORDER — FENTANYL CITRATE 50 UG/ML
25-50 INJECTION, SOLUTION INTRAMUSCULAR; INTRAVENOUS
Status: ACTIVE | OUTPATIENT
Start: 2020-01-01 | End: 2020-01-01

## 2020-01-01 RX ADMIN — ABACAVIR 600 MG: 300 TABLET, FILM COATED ORAL at 20:11

## 2020-01-01 RX ADMIN — Medication 2200 UNITS: at 18:35

## 2020-01-01 RX ADMIN — NITROGLYCERIN 0.4 MG: 0.4 TABLET SUBLINGUAL at 20:09

## 2020-01-01 RX ADMIN — DOLUTEGRAVIR SODIUM 50 MG: 50 TABLET, FILM COATED ORAL at 21:10

## 2020-01-01 RX ADMIN — DOLUTEGRAVIR SODIUM 50 MG: 50 TABLET, FILM COATED ORAL at 22:34

## 2020-01-01 RX ADMIN — PANTOPRAZOLE SODIUM 40 MG: 40 TABLET, DELAYED RELEASE ORAL at 09:46

## 2020-01-01 RX ADMIN — ATORVASTATIN CALCIUM 40 MG: 40 TABLET, FILM COATED ORAL at 21:39

## 2020-01-01 RX ADMIN — CARVEDILOL 12.5 MG: 12.5 TABLET, FILM COATED ORAL at 17:25

## 2020-01-01 RX ADMIN — SODIUM CHLORIDE 250 ML: 9 INJECTION, SOLUTION INTRAVENOUS at 13:44

## 2020-01-01 RX ADMIN — DAPSONE 100 MG: 100 TABLET ORAL at 22:19

## 2020-01-01 RX ADMIN — ISOSORBIDE MONONITRATE 120 MG: 60 TABLET, EXTENDED RELEASE ORAL at 20:09

## 2020-01-01 RX ADMIN — INSULIN ASPART 1 UNITS: 100 INJECTION, SOLUTION INTRAVENOUS; SUBCUTANEOUS at 17:36

## 2020-01-01 RX ADMIN — HEPARIN SODIUM 900 UNITS/HR: 10000 INJECTION, SOLUTION INTRAVENOUS at 11:21

## 2020-01-01 RX ADMIN — NITROGLYCERIN 0.4 MG: 0.4 TABLET SUBLINGUAL at 08:44

## 2020-01-01 RX ADMIN — DARUNAVIR 800 MG: 800 TABLET, FILM COATED ORAL at 21:09

## 2020-01-01 RX ADMIN — MIRTAZAPINE 15 MG: 15 TABLET, FILM COATED ORAL at 22:19

## 2020-01-01 RX ADMIN — FUROSEMIDE 40 MG: 40 TABLET ORAL at 09:46

## 2020-01-01 RX ADMIN — INSULIN ASPART 1 UNITS: 100 INJECTION, SOLUTION INTRAVENOUS; SUBCUTANEOUS at 18:16

## 2020-01-01 RX ADMIN — ABACAVIR 600 MG: 300 TABLET, FILM COATED ORAL at 19:46

## 2020-01-01 RX ADMIN — DOLUTEGRAVIR SODIUM 50 MG: 50 TABLET, FILM COATED ORAL at 22:40

## 2020-01-01 RX ADMIN — PANTOPRAZOLE SODIUM 40 MG: 40 TABLET, DELAYED RELEASE ORAL at 11:37

## 2020-01-01 RX ADMIN — SODIUM CHLORIDE 250 ML: 9 INJECTION, SOLUTION INTRAVENOUS at 11:15

## 2020-01-01 RX ADMIN — ATORVASTATIN CALCIUM 40 MG: 40 TABLET, FILM COATED ORAL at 22:19

## 2020-01-01 RX ADMIN — INSULIN ASPART 2 UNITS: 100 INJECTION, SOLUTION INTRAVENOUS; SUBCUTANEOUS at 20:04

## 2020-01-01 RX ADMIN — LIDOCAINE HYDROCHLORIDE 8 ML: 10 INJECTION, SOLUTION EPIDURAL; INFILTRATION; INTRACAUDAL; PERINEURAL at 13:45

## 2020-01-01 RX ADMIN — PANTOPRAZOLE SODIUM 40 MG: 40 TABLET, DELAYED RELEASE ORAL at 08:39

## 2020-01-01 RX ADMIN — RITONAVIR 100 MG: 100 TABLET, FILM COATED ORAL at 22:20

## 2020-01-01 RX ADMIN — SODIUM CHLORIDE 250 ML: 9 INJECTION, SOLUTION INTRAVENOUS at 08:21

## 2020-01-01 RX ADMIN — Medication 4850 UNITS: at 16:45

## 2020-01-01 RX ADMIN — MIRTAZAPINE 15 MG: 15 TABLET, FILM COATED ORAL at 22:30

## 2020-01-01 RX ADMIN — CLOPIDOGREL BISULFATE 75 MG: 75 TABLET ORAL at 15:53

## 2020-01-01 RX ADMIN — SUCROFERRIC OXYHYDROXIDE 500 MG: 500 TABLET, CHEWABLE ORAL at 18:06

## 2020-01-01 RX ADMIN — ISOSORBIDE MONONITRATE 120 MG: 60 TABLET, EXTENDED RELEASE ORAL at 19:59

## 2020-01-01 RX ADMIN — CARVEDILOL 12.5 MG: 12.5 TABLET, FILM COATED ORAL at 19:44

## 2020-01-01 RX ADMIN — SODIUM CHLORIDE 250 ML: 9 INJECTION, SOLUTION INTRAVENOUS at 11:40

## 2020-01-01 RX ADMIN — CARVEDILOL 12.5 MG: 12.5 TABLET, FILM COATED ORAL at 14:04

## 2020-01-01 RX ADMIN — PANTOPRAZOLE SODIUM 40 MG: 40 TABLET, DELAYED RELEASE ORAL at 09:32

## 2020-01-01 RX ADMIN — MIRTAZAPINE 15 MG: 15 TABLET, FILM COATED ORAL at 22:40

## 2020-01-01 RX ADMIN — GABAPENTIN 300 MG: 300 CAPSULE ORAL at 20:38

## 2020-01-01 RX ADMIN — GABAPENTIN 300 MG: 300 CAPSULE ORAL at 21:32

## 2020-01-01 RX ADMIN — NITROGLYCERIN 0.4 MG: 0.4 TABLET SUBLINGUAL at 23:26

## 2020-01-01 RX ADMIN — SODIUM CHLORIDE 300 ML: 9 INJECTION, SOLUTION INTRAVENOUS at 14:13

## 2020-01-01 RX ADMIN — RITONAVIR 100 MG: 100 TABLET, FILM COATED ORAL at 22:40

## 2020-01-01 RX ADMIN — UMECLIDINIUM 1 PUFF: 62.5 AEROSOL, POWDER ORAL at 09:06

## 2020-01-01 RX ADMIN — CINACALCET 30 MG: 30 TABLET, FILM COATED ORAL at 18:11

## 2020-01-01 RX ADMIN — DAPSONE 100 MG: 100 TABLET ORAL at 22:39

## 2020-01-01 RX ADMIN — GABAPENTIN 300 MG: 300 CAPSULE ORAL at 09:32

## 2020-01-01 RX ADMIN — ISOSORBIDE MONONITRATE 120 MG: 60 TABLET, EXTENDED RELEASE ORAL at 20:23

## 2020-01-01 RX ADMIN — MIDAZOLAM 0.5 MG: 1 INJECTION INTRAMUSCULAR; INTRAVENOUS at 13:47

## 2020-01-01 RX ADMIN — INSULIN ASPART 2 UNITS: 100 INJECTION, SOLUTION INTRAVENOUS; SUBCUTANEOUS at 15:45

## 2020-01-01 RX ADMIN — Medication 2250 UNITS: at 02:46

## 2020-01-01 RX ADMIN — HYDROXYZINE HYDROCHLORIDE 25 MG: 25 TABLET, FILM COATED ORAL at 06:25

## 2020-01-01 RX ADMIN — CARVEDILOL 12.5 MG: 12.5 TABLET, FILM COATED ORAL at 18:25

## 2020-01-01 RX ADMIN — Medication 4050 UNITS: at 01:11

## 2020-01-01 RX ADMIN — MIRTAZAPINE 15 MG: 15 TABLET, FILM COATED ORAL at 21:32

## 2020-01-01 RX ADMIN — EPOETIN ALFA-EPBX 20000 UNITS: 10000 INJECTION, SOLUTION INTRAVENOUS; SUBCUTANEOUS at 13:43

## 2020-01-01 RX ADMIN — PANTOPRAZOLE SODIUM 40 MG: 40 TABLET, DELAYED RELEASE ORAL at 13:49

## 2020-01-01 RX ADMIN — SODIUM CHLORIDE 300 ML: 9 INJECTION, SOLUTION INTRAVENOUS at 07:00

## 2020-01-01 RX ADMIN — INSULIN ASPART 3 UNITS: 100 INJECTION, SOLUTION INTRAVENOUS; SUBCUTANEOUS at 04:10

## 2020-01-01 RX ADMIN — DAPSONE 100 MG: 100 TABLET ORAL at 21:10

## 2020-01-01 RX ADMIN — GABAPENTIN 300 MG: 300 CAPSULE ORAL at 09:46

## 2020-01-01 RX ADMIN — HEPARIN SODIUM 1250 UNITS/HR: 10000 INJECTION, SOLUTION INTRAVENOUS at 07:59

## 2020-01-01 RX ADMIN — ATORVASTATIN CALCIUM 40 MG: 40 TABLET, FILM COATED ORAL at 22:39

## 2020-01-01 RX ADMIN — ISOSORBIDE MONONITRATE 120 MG: 60 TABLET, EXTENDED RELEASE ORAL at 20:56

## 2020-01-01 RX ADMIN — HEPARIN SODIUM 1200 UNITS/HR: 10000 INJECTION, SOLUTION INTRAVENOUS at 04:24

## 2020-01-01 RX ADMIN — SODIUM CHLORIDE 250 ML: 9 INJECTION, SOLUTION INTRAVENOUS at 08:48

## 2020-01-01 RX ADMIN — CLOPIDOGREL BISULFATE 75 MG: 75 TABLET ORAL at 08:39

## 2020-01-01 RX ADMIN — CARVEDILOL 6.25 MG: 6.25 TABLET, FILM COATED ORAL at 11:59

## 2020-01-01 RX ADMIN — NITROGLYCERIN 0.4 MG: 0.4 TABLET SUBLINGUAL at 20:38

## 2020-01-01 RX ADMIN — ISOSORBIDE MONONITRATE 30 MG: 30 TABLET, EXTENDED RELEASE ORAL at 09:46

## 2020-01-01 RX ADMIN — PANTOPRAZOLE SODIUM 40 MG: 40 TABLET, DELAYED RELEASE ORAL at 08:23

## 2020-01-01 RX ADMIN — NITROGLYCERIN 0.4 MG: 0.4 TABLET SUBLINGUAL at 08:56

## 2020-01-01 RX ADMIN — ASPIRIN 81 MG: 81 TABLET, COATED ORAL at 20:21

## 2020-01-01 RX ADMIN — ATORVASTATIN CALCIUM 40 MG: 40 TABLET, FILM COATED ORAL at 21:08

## 2020-01-01 RX ADMIN — ASPIRIN 81 MG: 81 TABLET, COATED ORAL at 20:09

## 2020-01-01 RX ADMIN — ABACAVIR 600 MG: 300 TABLET, FILM COATED ORAL at 21:08

## 2020-01-01 RX ADMIN — FUROSEMIDE 80 MG: 10 INJECTION, SOLUTION INTRAMUSCULAR; INTRAVENOUS at 02:18

## 2020-01-01 RX ADMIN — CARVEDILOL 3.12 MG: 3.12 TABLET, FILM COATED ORAL at 08:32

## 2020-01-01 RX ADMIN — SODIUM CHLORIDE 200 ML: 9 INJECTION, SOLUTION INTRAVENOUS at 14:01

## 2020-01-01 RX ADMIN — ASPIRIN 81 MG: 81 TABLET, COATED ORAL at 19:59

## 2020-01-01 RX ADMIN — INSULIN ASPART 3 UNITS: 100 INJECTION, SOLUTION INTRAVENOUS; SUBCUTANEOUS at 14:14

## 2020-01-01 RX ADMIN — ISOSORBIDE MONONITRATE 30 MG: 30 TABLET, EXTENDED RELEASE ORAL at 12:00

## 2020-01-01 RX ADMIN — NITROGLYCERIN 0.4 MG: 0.4 TABLET SUBLINGUAL at 06:14

## 2020-01-01 RX ADMIN — ABACAVIR 600 MG: 300 TABLET, FILM COATED ORAL at 22:40

## 2020-01-01 RX ADMIN — GABAPENTIN 300 MG: 300 CAPSULE ORAL at 21:08

## 2020-01-01 RX ADMIN — ASPIRIN 325 MG ORAL TABLET 325 MG: 325 PILL ORAL at 12:00

## 2020-01-01 RX ADMIN — HEPARIN SODIUM 500 UNITS/HR: 1000 INJECTION INTRAVENOUS; SUBCUTANEOUS at 13:44

## 2020-01-01 RX ADMIN — PANTOPRAZOLE SODIUM 40 MG: 40 TABLET, DELAYED RELEASE ORAL at 12:01

## 2020-01-01 RX ADMIN — GABAPENTIN 300 MG: 300 CAPSULE ORAL at 08:01

## 2020-01-01 RX ADMIN — INSULIN ASPART 1 UNITS: 100 INJECTION, SOLUTION INTRAVENOUS; SUBCUTANEOUS at 13:06

## 2020-01-01 RX ADMIN — CLOPIDOGREL BISULFATE 300 MG: 75 TABLET ORAL at 11:40

## 2020-01-01 RX ADMIN — ABACAVIR 600 MG: 300 TABLET, FILM COATED ORAL at 20:56

## 2020-01-01 RX ADMIN — ATORVASTATIN CALCIUM 40 MG: 40 TABLET, FILM COATED ORAL at 21:33

## 2020-01-01 RX ADMIN — MIDODRINE HYDROCHLORIDE 10 MG: 5 TABLET ORAL at 13:20

## 2020-01-01 RX ADMIN — DARUNAVIR 800 MG: 800 TABLET, FILM COATED ORAL at 22:20

## 2020-01-01 RX ADMIN — DAPSONE 100 MG: 100 TABLET ORAL at 21:32

## 2020-01-01 RX ADMIN — UMECLIDINIUM 1 PUFF: 62.5 AEROSOL, POWDER ORAL at 12:38

## 2020-01-01 RX ADMIN — INSULIN ASPART 1 UNITS: 100 INJECTION, SOLUTION INTRAVENOUS; SUBCUTANEOUS at 07:56

## 2020-01-01 RX ADMIN — DARUNAVIR 800 MG: 800 TABLET, FILM COATED ORAL at 21:34

## 2020-01-01 RX ADMIN — MIRTAZAPINE 15 MG: 15 TABLET, FILM COATED ORAL at 21:39

## 2020-01-01 RX ADMIN — INSULIN ASPART 3 UNITS: 100 INJECTION, SOLUTION INTRAVENOUS; SUBCUTANEOUS at 18:30

## 2020-01-01 RX ADMIN — PANTOPRAZOLE SODIUM 40 MG: 40 TABLET, DELAYED RELEASE ORAL at 18:16

## 2020-01-01 RX ADMIN — SUCROFERRIC OXYHYDROXIDE 500 MG: 500 TABLET, CHEWABLE ORAL at 09:11

## 2020-01-01 RX ADMIN — ASPIRIN 81 MG: 81 TABLET, COATED ORAL at 19:43

## 2020-01-01 RX ADMIN — CLONAZEPAM 0.5 MG: 0.5 TABLET ORAL at 20:58

## 2020-01-01 RX ADMIN — CLOPIDOGREL BISULFATE 75 MG: 75 TABLET ORAL at 12:00

## 2020-01-01 RX ADMIN — ASPIRIN 325 MG ORAL TABLET 325 MG: 325 PILL ORAL at 11:38

## 2020-01-01 RX ADMIN — ATORVASTATIN CALCIUM 40 MG: 40 TABLET, FILM COATED ORAL at 22:30

## 2020-01-01 RX ADMIN — SENNOSIDES AND DOCUSATE SODIUM 2 TABLET: 8.6; 5 TABLET ORAL at 16:53

## 2020-01-01 RX ADMIN — CLOPIDOGREL BISULFATE 75 MG: 75 TABLET ORAL at 13:49

## 2020-01-01 RX ADMIN — RITONAVIR 100 MG: 100 TABLET, FILM COATED ORAL at 22:33

## 2020-01-01 RX ADMIN — SUCROFERRIC OXYHYDROXIDE 500 MG: 500 TABLET, CHEWABLE ORAL at 12:44

## 2020-01-01 RX ADMIN — INSULIN ASPART 1 UNITS: 100 INJECTION, SOLUTION INTRAVENOUS; SUBCUTANEOUS at 22:18

## 2020-01-01 RX ADMIN — SODIUM CHLORIDE 300 ML: 9 INJECTION, SOLUTION INTRAVENOUS at 11:23

## 2020-01-01 RX ADMIN — ISOSORBIDE MONONITRATE 120 MG: 60 TABLET, EXTENDED RELEASE ORAL at 19:46

## 2020-01-01 RX ADMIN — SODIUM CHLORIDE 300 ML: 9 INJECTION, SOLUTION INTRAVENOUS at 11:15

## 2020-01-01 RX ADMIN — INSULIN ASPART 2 UNITS: 100 INJECTION, SOLUTION INTRAVENOUS; SUBCUTANEOUS at 18:25

## 2020-01-01 RX ADMIN — ASPIRIN 81 MG: 81 TABLET, COATED ORAL at 19:46

## 2020-01-01 RX ADMIN — GABAPENTIN 300 MG: 300 CAPSULE ORAL at 20:56

## 2020-01-01 RX ADMIN — HYDROMORPHONE HYDROCHLORIDE 0.2 MG: 1 INJECTION, SOLUTION INTRAMUSCULAR; INTRAVENOUS; SUBCUTANEOUS at 00:18

## 2020-01-01 RX ADMIN — Medication 4500 UNITS: at 11:22

## 2020-01-01 RX ADMIN — ABACAVIR 600 MG: 300 TABLET, FILM COATED ORAL at 20:01

## 2020-01-01 RX ADMIN — PANTOPRAZOLE SODIUM 40 MG: 40 TABLET, DELAYED RELEASE ORAL at 08:01

## 2020-01-01 RX ADMIN — INSULIN ASPART 4 UNITS: 100 INJECTION, SOLUTION INTRAVENOUS; SUBCUTANEOUS at 09:51

## 2020-01-01 RX ADMIN — RITONAVIR 100 MG: 100 TABLET, FILM COATED ORAL at 21:08

## 2020-01-01 RX ADMIN — NITROGLYCERIN 0.4 MG: 0.4 TABLET SUBLINGUAL at 23:30

## 2020-01-01 RX ADMIN — HEPARIN SODIUM 1000 UNITS/HR: 10000 INJECTION, SOLUTION INTRAVENOUS at 07:24

## 2020-01-01 RX ADMIN — IOPAMIDOL 80 ML: 755 INJECTION, SOLUTION INTRAVENOUS at 14:12

## 2020-01-01 RX ADMIN — CARVEDILOL 12.5 MG: 12.5 TABLET, FILM COATED ORAL at 08:39

## 2020-01-01 RX ADMIN — IRBESARTAN 300 MG: 150 TABLET ORAL at 22:27

## 2020-01-01 RX ADMIN — DOLUTEGRAVIR SODIUM 50 MG: 50 TABLET, FILM COATED ORAL at 22:27

## 2020-01-01 RX ADMIN — NITROGLYCERIN 0.4 MG: 0.4 TABLET SUBLINGUAL at 04:09

## 2020-01-01 RX ADMIN — SODIUM CHLORIDE 250 ML: 9 INJECTION, SOLUTION INTRAVENOUS at 11:23

## 2020-01-01 RX ADMIN — CARVEDILOL 12.5 MG: 12.5 TABLET, FILM COATED ORAL at 17:36

## 2020-01-01 RX ADMIN — RITONAVIR 100 MG: 100 TABLET, FILM COATED ORAL at 22:34

## 2020-01-01 RX ADMIN — IRBESARTAN 300 MG: 75 TABLET ORAL at 21:33

## 2020-01-01 RX ADMIN — ERYTHROPOIETIN 10000 UNITS: 10000 INJECTION, SOLUTION INTRAVENOUS; SUBCUTANEOUS at 14:14

## 2020-01-01 RX ADMIN — SODIUM CHLORIDE 300 ML: 9 INJECTION, SOLUTION INTRAVENOUS at 08:48

## 2020-01-01 RX ADMIN — REGADENOSON 0.4 MG: 0.08 INJECTION, SOLUTION INTRAVENOUS at 09:34

## 2020-01-01 RX ADMIN — GABAPENTIN 300 MG: 300 CAPSULE ORAL at 20:09

## 2020-01-01 RX ADMIN — CARVEDILOL 12.5 MG: 12.5 TABLET, FILM COATED ORAL at 09:04

## 2020-01-01 RX ADMIN — CARVEDILOL 6.25 MG: 6.25 TABLET, FILM COATED ORAL at 09:46

## 2020-01-01 RX ADMIN — Medication 400 MG: at 22:40

## 2020-01-01 RX ADMIN — CLOPIDOGREL BISULFATE 150 MG: 75 TABLET ORAL at 16:57

## 2020-01-01 RX ADMIN — FENTANYL CITRATE 25 MCG: 50 INJECTION, SOLUTION INTRAMUSCULAR; INTRAVENOUS at 13:44

## 2020-01-01 RX ADMIN — HEPARIN SODIUM 950 UNITS/HR: 10000 INJECTION, SOLUTION INTRAVENOUS at 16:40

## 2020-01-01 RX ADMIN — IRBESARTAN 300 MG: 150 TABLET ORAL at 22:19

## 2020-01-01 RX ADMIN — HEPARIN SODIUM 500 UNITS/HR: 1000 INJECTION INTRAVENOUS; SUBCUTANEOUS at 14:12

## 2020-01-01 RX ADMIN — EPOETIN ALFA 5000 UNITS: 3000 SOLUTION INTRAVENOUS; SUBCUTANEOUS at 15:13

## 2020-01-01 RX ADMIN — SUCROFERRIC OXYHYDROXIDE 500 MG: 500 TABLET, CHEWABLE ORAL at 18:11

## 2020-01-01 RX ADMIN — DOLUTEGRAVIR SODIUM 50 MG: 50 TABLET, FILM COATED ORAL at 21:34

## 2020-01-01 RX ADMIN — DAPSONE 100 MG: 100 TABLET ORAL at 23:01

## 2020-01-01 RX ADMIN — DAPSONE 100 MG: 100 TABLET ORAL at 22:30

## 2020-01-01 RX ADMIN — Medication 5000 UNITS: at 07:25

## 2020-01-01 RX ADMIN — ASPIRIN 81 MG: 81 TABLET, COATED ORAL at 09:46

## 2020-01-01 RX ADMIN — GABAPENTIN 300 MG: 300 CAPSULE ORAL at 09:04

## 2020-01-01 RX ADMIN — ASPIRIN 81 MG: 81 TABLET, COATED ORAL at 20:23

## 2020-01-01 RX ADMIN — MIRTAZAPINE 15 MG: 15 TABLET, FILM COATED ORAL at 22:27

## 2020-01-01 RX ADMIN — GABAPENTIN 300 MG: 300 CAPSULE ORAL at 13:48

## 2020-01-01 RX ADMIN — EPOETIN ALFA-EPBX 20000 UNITS: 10000 INJECTION, SOLUTION INTRAVENOUS; SUBCUTANEOUS at 11:16

## 2020-01-01 RX ADMIN — NITROGLYCERIN 0.07 MCG/KG/MIN: 20 INJECTION INTRAVENOUS at 00:54

## 2020-01-01 RX ADMIN — SODIUM CHLORIDE: 9 INJECTION, SOLUTION INTRAVENOUS at 15:03

## 2020-01-01 RX ADMIN — DOLUTEGRAVIR SODIUM 50 MG: 50 TABLET, FILM COATED ORAL at 22:20

## 2020-01-01 RX ADMIN — ASPIRIN 81 MG: 81 TABLET, COATED ORAL at 20:56

## 2020-01-01 RX ADMIN — NITROGLYCERIN 0.4 MG: 0.4 TABLET SUBLINGUAL at 05:34

## 2020-01-01 RX ADMIN — IRBESARTAN 300 MG: 75 TABLET ORAL at 22:19

## 2020-01-01 RX ADMIN — HEPARIN SODIUM 500 UNITS/HR: 1000 INJECTION INTRAVENOUS; SUBCUTANEOUS at 11:22

## 2020-01-01 RX ADMIN — ERYTHROPOIETIN 10000 UNITS: 10000 INJECTION, SOLUTION INTRAVENOUS; SUBCUTANEOUS at 11:22

## 2020-01-01 RX ADMIN — TETROFOSMIN 30.6 MCI.: 1.38 INJECTION, POWDER, LYOPHILIZED, FOR SOLUTION INTRAVENOUS at 09:42

## 2020-01-01 RX ADMIN — SODIUM CHLORIDE 250 ML: 9 INJECTION, SOLUTION INTRAVENOUS at 11:08

## 2020-01-01 RX ADMIN — GABAPENTIN 300 MG: 300 CAPSULE ORAL at 08:29

## 2020-01-01 RX ADMIN — PANTOPRAZOLE SODIUM 40 MG: 40 TABLET, DELAYED RELEASE ORAL at 08:29

## 2020-01-01 RX ADMIN — NITROGLYCERIN 0.4 MG: 0.4 TABLET SUBLINGUAL at 22:35

## 2020-01-01 RX ADMIN — SODIUM CHLORIDE 300 ML: 9 INJECTION, SOLUTION INTRAVENOUS at 13:43

## 2020-01-01 RX ADMIN — GABAPENTIN 300 MG: 300 CAPSULE ORAL at 08:23

## 2020-01-01 RX ADMIN — CARVEDILOL 12.5 MG: 12.5 TABLET, FILM COATED ORAL at 09:32

## 2020-01-01 RX ADMIN — GABAPENTIN 300 MG: 300 CAPSULE ORAL at 20:21

## 2020-01-01 RX ADMIN — DARUNAVIR 800 MG: 800 TABLET, FILM COATED ORAL at 22:27

## 2020-01-01 RX ADMIN — TETROFOSMIN 10.4 MCI.: 1.38 INJECTION, POWDER, LYOPHILIZED, FOR SOLUTION INTRAVENOUS at 07:44

## 2020-01-01 RX ADMIN — CARVEDILOL 6.25 MG: 6.25 TABLET, FILM COATED ORAL at 18:06

## 2020-01-01 RX ADMIN — SODIUM CHLORIDE 250 ML: 9 INJECTION, SOLUTION INTRAVENOUS at 14:01

## 2020-01-01 RX ADMIN — ACETAMINOPHEN 650 MG: 325 TABLET, FILM COATED ORAL at 20:37

## 2020-01-01 RX ADMIN — PANTOPRAZOLE SODIUM 40 MG: 40 TABLET, DELAYED RELEASE ORAL at 18:36

## 2020-01-01 RX ADMIN — HEPARIN SODIUM 1350 UNITS/HR: 10000 INJECTION, SOLUTION INTRAVENOUS at 08:47

## 2020-01-01 RX ADMIN — INSULIN ASPART 1 UNITS: 100 INJECTION, SOLUTION INTRAVENOUS; SUBCUTANEOUS at 10:12

## 2020-01-01 RX ADMIN — UMECLIDINIUM 1 PUFF: 62.5 AEROSOL, POWDER ORAL at 08:39

## 2020-01-01 RX ADMIN — ISOSORBIDE MONONITRATE 120 MG: 60 TABLET, EXTENDED RELEASE ORAL at 19:43

## 2020-01-01 RX ADMIN — Medication 400 MG: at 22:27

## 2020-01-01 RX ADMIN — MIDAZOLAM 0.5 MG: 1 INJECTION INTRAMUSCULAR; INTRAVENOUS at 13:44

## 2020-01-01 RX ADMIN — INSULIN ASPART 1 UNITS: 100 INJECTION, SOLUTION INTRAVENOUS; SUBCUTANEOUS at 09:10

## 2020-01-01 RX ADMIN — PANTOPRAZOLE SODIUM 40 MG: 40 TABLET, DELAYED RELEASE ORAL at 09:04

## 2020-01-01 RX ADMIN — SODIUM CHLORIDE 250 ML: 9 INJECTION, SOLUTION INTRAVENOUS at 14:13

## 2020-01-01 RX ADMIN — SUCROFERRIC OXYHYDROXIDE 500 MG: 500 TABLET, CHEWABLE ORAL at 16:13

## 2020-01-01 RX ADMIN — ATORVASTATIN CALCIUM 40 MG: 40 TABLET, FILM COATED ORAL at 22:06

## 2020-01-01 RX ADMIN — PANTOPRAZOLE SODIUM 40 MG: 40 TABLET, DELAYED RELEASE ORAL at 15:53

## 2020-01-01 RX ADMIN — IRBESARTAN 300 MG: 150 TABLET ORAL at 21:39

## 2020-01-01 RX ADMIN — CLOPIDOGREL BISULFATE 75 MG: 75 TABLET ORAL at 09:46

## 2020-01-01 RX ADMIN — DARUNAVIR 800 MG: 800 TABLET, FILM COATED ORAL at 22:40

## 2020-01-01 RX ADMIN — SODIUM CHLORIDE 300 ML: 9 INJECTION, SOLUTION INTRAVENOUS at 11:07

## 2020-01-01 RX ADMIN — CINACALCET HYDROCHLORIDE 30 MG: 30 TABLET, FILM COATED ORAL at 15:43

## 2020-01-01 RX ADMIN — IRBESARTAN 300 MG: 75 TABLET ORAL at 22:30

## 2020-01-01 RX ADMIN — CLOPIDOGREL BISULFATE 75 MG: 75 TABLET ORAL at 08:29

## 2020-01-01 RX ADMIN — RITONAVIR 100 MG: 100 TABLET, FILM COATED ORAL at 21:34

## 2020-01-01 RX ADMIN — ATORVASTATIN CALCIUM 40 MG: 40 TABLET, FILM COATED ORAL at 22:27

## 2020-01-01 RX ADMIN — PANTOPRAZOLE SODIUM 40 MG: 40 TABLET, DELAYED RELEASE ORAL at 14:03

## 2020-01-01 RX ADMIN — ACETAMINOPHEN 650 MG: 325 TABLET, FILM COATED ORAL at 22:40

## 2020-01-01 RX ADMIN — ACETAMINOPHEN 1000 MG: 500 TABLET, FILM COATED ORAL at 08:15

## 2020-01-01 RX ADMIN — MIRTAZAPINE 15 MG: 15 TABLET, FILM COATED ORAL at 21:08

## 2020-01-01 RX ADMIN — CINACALCET 30 MG: 30 TABLET, FILM COATED ORAL at 18:38

## 2020-01-01 ASSESSMENT — ENCOUNTER SYMPTOMS
WEAKNESS: 0
NAUSEA: 0
LIGHT-HEADEDNESS: 1
ABDOMINAL PAIN: 0
DIZZINESS: 0
BLOOD IN STOOL: 0
VOMITING: 0
NAUSEA: 1
SHORTNESS OF BREATH: 1
COUGH: 0
VOMITING: 0
SHORTNESS OF BREATH: 1
ABDOMINAL PAIN: 0
CHILLS: 0
FEVER: 0
BLOOD IN STOOL: 0
CONSTIPATION: 1
BACK PAIN: 1
ABDOMINAL PAIN: 0
COUGH: 1
CHILLS: 0
SHORTNESS OF BREATH: 1
LIGHT-HEADEDNESS: 0
DIARRHEA: 0
SORE THROAT: 0
SHORTNESS OF BREATH: 1
DIAPHORESIS: 1
FEVER: 0
ABDOMINAL PAIN: 0
VOMITING: 0
COUGH: 0
VOMITING: 0
BACK PAIN: 0
NAUSEA: 1
DIAPHORESIS: 0
FEVER: 0
FEVER: 0
DIZZINESS: 1
NAUSEA: 0

## 2020-01-01 ASSESSMENT — ACTIVITIES OF DAILY LIVING (ADL)
ADLS_ACUITY_SCORE: 16
ADLS_ACUITY_SCORE: 17
ADLS_ACUITY_SCORE: 16
BATHING: 0-->INDEPENDENT
WHICH_OF_THE_ABOVE_FUNCTIONAL_RISKS_HAD_A_RECENT_ONSET_OR_CHANGE?: AMBULATION;TRANSFERRING
ADLS_ACUITY_SCORE: 16
ADLS_ACUITY_SCORE: 16
FALL_HISTORY_WITHIN_LAST_SIX_MONTHS: NO
ADLS_ACUITY_SCORE: 16
COGNITION: 0 - NO COGNITION ISSUES REPORTED
ADLS_ACUITY_SCORE: 16
ADLS_ACUITY_SCORE: 16
ADLS_ACUITY_SCORE: 12
ADLS_ACUITY_SCORE: 16
DRESS: 0-->INDEPENDENT
ADLS_ACUITY_SCORE: 16
TOILETING: 0-->INDEPENDENT
RETIRED_COMMUNICATION: 0-->UNDERSTANDS/COMMUNICATES WITHOUT DIFFICULTY
ADLS_ACUITY_SCORE: 17
ADLS_ACUITY_SCORE: 16
ADLS_ACUITY_SCORE: 17
ADLS_ACUITY_SCORE: 17
ADLS_ACUITY_SCORE: 16
SWALLOWING: 0-->SWALLOWS FOODS/LIQUIDS WITHOUT DIFFICULTY
ADLS_ACUITY_SCORE: 16
AMBULATION: 1-->ASSISTIVE EQUIPMENT
RETIRED_EATING: 0-->INDEPENDENT
ADLS_ACUITY_SCORE: 16
TRANSFERRING: 1-->ASSISTIVE EQUIPMENT
ADLS_ACUITY_SCORE: 16

## 2020-01-01 ASSESSMENT — MIFFLIN-ST. JEOR
SCORE: 1676.6
SCORE: 1640.13
SCORE: 1640.77
SCORE: 1668.89
SCORE: 1676.6

## 2020-01-14 NOTE — TELEPHONE ENCOUNTER
Overhead page from Home care RN that pt c/o pain at PPM site. Called pt and he states the pain has been off and on for about a week. He looked at his device site and there is no redness, swelling, drainage of warmth to the touch. He does not have a fever. Pt states pain is mild. It feels like someone hit his shoulder.It is not like the pain prior to his NSTEMIs.  He did not bump the device site. Will continue to monitor for now. He will call back if pain becomes worse or there are other symptoms of infection.

## 2020-03-02 NOTE — PROGRESS NOTES
[facility]  Patient is currently open to home care services with [facility].  The patient is currently receiving RN services.  [facility]  and team have been notified of patient admission.  [facility] liaison will continue to follow patient during stay.  If appropriate provide orders to resume home care at time of discharge.

## 2020-03-02 NOTE — ED NOTES
[facility]  ED Nurse Handoff Report    Donald Raza is a 70 year old male   ED Chief complaint: Chest Pain  . ED Diagnosis:   Final diagnoses:   Chest pain, unspecified type     Allergies:   Allergies   Allergen Reactions     Calcium Acetate Other (See Comments)     Other reaction(s): Other, see comments  Pain in chest and back  Pain in chest area (sensitive skin)      Contrast Dye Other (See Comments)     Contrast nephropathy     Diagnostic X-Ray Materials Other (See Comments)     PN: renal failure     Lisinopril      Sulfa Drugs        Code Status: Full Code  Activity level - Baseline/Home:  Independent. Activity Level - Current:   Stand by Assist. Lift room needed: No. Bariatric: No   Needed: No   Isolation: No. Infection: Not Applicable.     Vital Signs:   Vitals:    03/02/20 0845 03/02/20 0900 03/02/20 0915 03/02/20 0930   BP: (!) 149/95 (!) 143/78 (!) 156/92 127/72   Pulse:  93 96 86   Resp: 26 18 13 16   SpO2: 96% 96% 96% 97%       Cardiac Rhythm:  ,      Pain level:    Patient confused: No. Patient Falls Risk: Yes.   Elimination Status: Has voided   Patient Report - Initial Complaint: chest pain. Focused Assessment: Review of Systems (Cardiac) - Cardiovascular Symptoms/Conditions: chest pain (right arm/leg-numbness feeling)  Chest Pain Assessment - Chest Pain Location:  (generalized chest) Associated Signs/Symptoms:  (SOB)  Cardiac Monitoring - EKG Monitoring: Yes   Tests Performed: Lab/imaging. Abnormal Results:   Labs Ordered and Resulted from Time of ED Arrival Up to the Time of Departure from the ED   CBC WITH PLATELETS DIFFERENTIAL - Abnormal; Notable for the following components:       Result Value    RBC Count 3.06 (*)     Hemoglobin 8.3 (*)     Hematocrit 28.5 (*)     MCHC 29.1 (*)     RDW 18.7 (*)     All other components within normal limits   BASIC METABOLIC PANEL - Abnormal; Notable for the following components:    Glucose 232 (*)     Urea Nitrogen 84 (*)     Creatinine  9.90 (*)     GFR Estimate 5 (*)     GFR Estimate If Black 5 (*)     Calcium 10.4 (*)     All other components within normal limits   TROPONIN I - Abnormal; Notable for the following components:    Troponin I ES 0.058 (*)     All other components within normal limits   NT PROBNP INPATIENT - Abnormal; Notable for the following components:    N-Terminal Pro BNP Inpatient 86,601 (*)     All other components within normal limits   TROPONIN I   CARDIAC CONTINUOUS MONITORING         Treatments provided: MAR  Family Comments: none present  OBS brochure/video discussed/provided to patient:  N/A  ED Medications:   Medications   nitroGLYcerin (NITROSTAT) sublingual tablet 0.4 mg (0.4 mg Sublingual Given 3/2/20 0856)     Drips infusing:  No  For the majority of the shift, the patient's behavior Green. Interventions performed were .    Sepsis treatment initiated: No       ED Nurse Name/Phone Number: Maia Cavanaugh RN,   9:43 AM    RECEIVING UNIT ED HANDOFF REVIEW    Above ED Nurse Handoff Report was reviewed: Yes  Reviewed by: Bharat Coleman RN on March 2, 2020 at 10:00 AM

## 2020-03-02 NOTE — PROGRESS NOTES
Potassium   Date Value Ref Range Status   03/02/2020 4.2 3.4 - 5.3 mmol/L Final     Hemoglobin   Date Value Ref Range Status   03/02/2020 7.9 (L) 13.3 - 17.7 g/dL Final     Creatinine   Date Value Ref Range Status   03/02/2020 9.90 (H) 0.66 - 1.25 mg/dL Final     Urea Nitrogen   Date Value Ref Range Status   03/02/2020 84 (H) 7 - 30 mg/dL Final     Sodium   Date Value Ref Range Status   03/02/2020 137 133 - 144 mmol/L Final     INR   Date Value Ref Range Status   05/13/2019 1.10 0.86 - 1.14 Final       DIALYSIS PROCEDURE NOTE  Hepatitis status of previous patient on machine log was checked and verified ok to use with this patients hepatitis status.  Patient dialyzed for 3.25 hrs. on a 3 K bath Na 138 with a net fluid removal of  2L.  A BFR of 400 ml/min was obtained via a LUAF using 15gauge needles.    The treatment plan was dicussed with Dr. Bradford during the treatment.  Total heparin received during the treatment: 0 units.   Needle cannulation sites held x 20 min, pressure dressings applied after hemostasis.  Meds  given: EPO 5,000units IV Complications: NONE    Access care/potassium/fluid restriction education provided orally to patient, patient verbalized understanding  ICEBOAT? Timeout performed pre-treatment  I: Patient was identified using 2 identifiers  C: Consent obtained/verified current before treatment  E: Equipment preventative maintenance is current and dialysis delivery system OK to use  B: Hepatitis B Surface Antigen: neg ; Draw Date: 10/29/19      Hepatitis B Surface Antibody: Immune ; Draw Date: 11/21/19  O: Dialysis orders present and complete prior to treatment  A: Vascular access verified and assessed prior to treatment  T: Treatment was performed at a clinically appropriate time  ?: Patient was allowed to ask questions and address concerns prior to treatment  See flowsheet in EPIC for further details and post assessment.  Machine water alarm in place and functioning. Transducer pods intact and  checked every 15min.  Pt returned via wheelchair  Report received from: Bharat Coleman R.N.  Report given to: Lexi Low R.N.  Chlorine/Chloramine water system checked every 4 hours.  Outpatient Dialysis at [facility]

## 2020-03-02 NOTE — CONSULTS
Cardiology Consultation     Donald Raza MRN# [id number]   YOB: 1950 Age: 70 year old   Date of Admission: 3/2/2020     Reason for consult: CP per request of ADDIE Noel           Assessment and Plan:   CP/ Troponin Elevation  -troponin peak 0.084 thus far / underlying LBBB  -known chronically elevated troponins due to ESRD  -ECHO pending  -in setting of significant anemia (hgb 7.9) and fluid overload  -extensive CAD history:  S/p LAD/Diag stenting (15) s/p LAD/Diag stenting due to in-stent restenosis (16)  S/p VANITA to rPDA and DEANNA (12/9/2019) with patent LAD/Diag stents at that time  -previous multiple hospitalizations for CP/elevated troponin due to hypertensive urgency, significant anemia, and fluid overload   -CP resolved with SL NTG  Now pain free  -on Plavix and Imdur  - Will continue to observe and trend troponin for now  -consider future vera NUC      HTN:  -on multiple medications:  Coreg 12.5mg BID/ Clonidine 0.1mg prn/ Irbesartan 300mg/ Imdur 120mg  -BP low at times during dialysis  - today prior to dialysis    Chronic Anemia:  -hgb down to 7.9 today  -felt to be related to ESRD and decreased RBC production  -has not done capsule endoscopy    Chronic HFpEF  -BNP 86,000 / mild bilateral vascular congestion  -LVEF 55% (12/2019)  ECHO pending today  -dialysis today  -no significant SOB    ESRD:  -dialysis today    S/p dual-chamber PPM for symptomatic bradycardia (5/2019)  -last interrogation (1/2020) showed 18% A-paced and 0% V-paced    Moderate Aortic Stenosis:    IDDM  -A1C 5.1    HIV  -on anti-retroviral therapy       Chief Complaint:       History is obtained from the patient         History of Present Illness:   This patient is a 70 year old male who presents with CP radiating down his right arm, SOB, nausea while walking to his car for dialysis. EMS was activated by the nurse and he was sent to [facility] ED.  His BP was 160/88 mmHg.  EKG shows SR with first degree AV block and  LBBB. CP resolved with SL NTG. CXR showed mild bilateral vascular congestion. His initial troponin was 0.058 and cayetano to 0.084 However, he does have chronically elevated troponins due to his underlying ESRD.  An ECHO ordered.  He has been arranged for dialysis today. His BNP is 86,000 and his hemoglobin is 7.9 with a PLT count of 143.    Donald follows with Dr. Diaz in clinic for a complex cardiovascular history.  He has suffered multiple NSTEMI and in the past has undergone diagonal and LAD stenting (2015) with repeat LAD/Diagonal stenting due to in-stent restenosis (8/2016). He since has had recurrent hospitalizations for CP with angiograms in 2017 and 2018 showing patent stents and non-flow limiting diagonal and side branch disease.  He has required blood transfusions for significant anemia which has resolved his CPs at times. At other times, his CPs have been related to severe uncontrolled HTN and HFpEF.  His BP and symptoms resolved with dialysis.    Due to severe symptomatic bradycardia, he underwent dual-chamber PPM (5/2019).  He has a longstanding history of DM and ESRD on dialysis for several years. Donald has HIV, chronic anemia, and HTN.     He has had ongoing issues with anemia and need for blood transfusions. Endoscopy and colonoscopy have not revealed a cause.  A capsule endoscopy has not been performed.  It has been felt that his anemia has been secondary to his ESRD and decreased RBC production.  He receives IV venofer weekly. Each admission, his troponin has been mildly elevated.       In December 2019, he was seen for CP and respiratory distress. There was a new LBBB noted with a troponin of 0.143.  He was transferred to [facility] and underwent coronary angiogram revealing a 99% lesion in rPDA and 85% DEANNA lesion.  He successfully underwent VANITA to dRCA/DEANNA and VANITA to PDA. His prior LAD and Diagonal stents were patent with up to 55% narrowing.  He again was placed on Plavix. An ECHO showed LVEF  "51%, TERI 1.0cm2 and peak AV gradient 39 mmHg (moderate aortic stenosis)     His last interrogation (1/6/2020) showed that he is 18% A-paced and 0% V-paced.     I saw patient when undergoing dialysis.  He no longer has any CP  He is quite sleepy. \"I'm just tired\"  He denies any recent CP prior to today.  He denies any significant SOB or peripheral edema.                Past Medical History:     Past Medical History:   Diagnosis Date     Allergic rhinitis      Anemia due to chronic kidney disease 10/21/2011     CAD S/P percutaneous coronary angioplasty 06/15/2015     Cataract      CKD (chronic kidney disease)      Colon cancer      COPD      Depression      Dilated aortic root 05/06/2016     Diverticulitis      ESRD (end stage renal disease) on dialysis 05/06/2016     Gout      Human immunodeficiency virus (HIV) disease      Hx of squamous cell carcinoma 03/23/2007     Hypertension 2010     Impotence of organic origin      Increased prostate specific antigen (PSA) velocity 08/08/2016    Awaiting bx on blood thinner     Mixed hyperlipidemia      Pulmonary HTN     Mod     TIA (transient ischemic attack) 5/2016     Type 2 diabetes mellitus age 50             Past Surgical History:     Past Surgical History:   Procedure Laterality Date     ANGIOGRAM  03-04-16    No culprit lesions, stents widely patent      ANGIOGRAM  05-06-16    Cutting balloon ptca=Diag     APPENDECTOMY  2000     CHOLECYSTECTOMY, LAPOROSCOPIC       COLON SURGERY       COLOSTOMY  09/30/1999    Temporary for diverticulitis     EP PACEMAKER N/A 5/2/2019    Procedure: EP Pacemaker;  Surgeon: Angelique Perera MD;  Location:  HEART CARDIAC CATH LAB     HC LEFT HEART CATHETERIZATION  03/12/2018    No significant change  Elevated LVEDp  LVEF 30% No PCI     HEART CATH, ANGIOPLASTY  08-18-16    LAD PCI. Stented with a 3.0 x 8 mm Xience Alpine stent.     IR DIALYSIS FISTULOGRAM LEFT  1/25/2019     STENT, CORONARY, - VANITA=Diag, PTCA=LAD  12/2015     " STENT, CORONARY, - VANITA=LAD  2015                Social History:     Social History     Tobacco Use     Smoking status: Former Smoker     Packs/day: 2.00     Years: 41.00     Pack years: 82.00     Types: Cigarettes     Last attempt to quit:      Years since quittin.1     Smokeless tobacco: Never Used   Substance Use Topics     Alcohol use: No     Alcohol/week: 0.0 standard drinks             Family History:     Family History   Problem Relation Age of Onset     Heart Disease Brother 40        CABG     Kidney Disease Sister      Hypertension Sister      Heart Disease Brother         Dilated aorta                     Allergies:     Allergies   Allergen Reactions     Calcium Acetate Other (See Comments)     Other reaction(s): Other, see comments  Pain in chest and back  Pain in chest area (sensitive skin)      Contrast Dye Other (See Comments)     Contrast nephropathy     Diagnostic X-Ray Materials Other (See Comments)     PN: renal failure     Lisinopril      Sulfa Drugs              Medications:     Medications Prior to Admission   Medication Sig Dispense Refill Last Dose     abacavir (ZIAGEN) 300 MG tablet Take 600 mg by mouth every evening    3/1/2020 at Unknown time     albuterol (PROAIR HFA/PROVENTIL HFA/VENTOLIN HFA) 108 (90 BASE) MCG/ACT Inhaler Inhale 2 puffs into the lungs every 6 hours as needed for shortness of breath / dyspnea or wheezing 1 Inhaler 1 Past Week at Unknown time     aspirin 81 MG EC tablet Take 81 mg by mouth every evening   3/1/2020 at Unknown time     atorvastatin (LIPITOR) 40 MG tablet Take 40 mg by mouth At Bedtime   3/1/2020 at Unknown time     carvedilol (COREG) 12.5 MG tablet Take 1 tablet (12.5 mg) by mouth 2 times daily (with meals) 60 tablet 1 3/1/2020 at Unknown time     chlorhexidine (CHLORHEXIDINE) 0.12 % solution Rinse and gargle 15 ml by mouth twice a day as directed.   3/1/2020 at Unknown time     CLONAZEPAM PO Take 0.5 mg by mouth nightly as needed for anxiety  (restless legs)   3/1/2020 at Unknown time     cloNIDine (CATAPRES) 0.1 MG tablet Take 0.1 mg by mouth daily as needed (for high blood pressure) If sbp > 180   Past Week at Unknown time     dapsone 100 MG tablet Take 100 mg by mouth At Bedtime    3/1/2020 at Unknown time     Darunavir Ethanolate (PREZISTA PO) Take 800 mg by mouth At Bedtime.   3/1/2020 at Unknown time     dolutegravir (TIVICAY) 50 MG tablet Take 50 mg by mouth At Bedtime   3/1/2020 at Unknown time     gabapentin (NEURONTIN) 300 MG capsule Take 300 mg by mouth as needed May take after HD.   Past Week at Unknown time     gabapentin (NEURONTIN) 300 MG capsule Take 300 mg by mouth 2 times daily    3/1/2020 at Unknown time     guaiFENesin (MUCINEX) 600 MG 12 hr tablet Take 1,200 mg by mouth 2 times daily as needed    Past Week at Unknown time     imiquimod (ALDARA) 5 % cream Apply topically as needed   Past Week at Unknown time     insulin glargine (LANTUS PEN) 100 UNIT/ML pen Inject 3 Units Subcutaneous 2 times daily as needed for other   Past Week at Unknown time     insulin lispro (HUMALOG PENFILL) 100 UNIT/ML Cartridge Inject 2 Units Subcutaneous 3 times daily (before meals) 3 mL 3 Past Week at Unknown time     insulin lispro (HUMALOG VIAL) 100 UNIT/ML vial Inject Subcutaneous 3 times daily (before meals) Takes 2 units for every 50 BG above 150. 3 mL 3 Past Week at Unknown time     irbesartan (AVAPRO) 300 MG tablet Take 1 tablet (300 mg) by mouth At Bedtime 30 tablet 0 3/1/2020 at Unknown time     Isosorbide Mononitrate  MG TB24 Take 1 tablet (120 mg) by mouth every evening 30 tablet 11 3/1/2020 at Unknown time     ketoconazole (NIZORAL) 2 % cream Apply topically daily Between toes for fungal infection   3/1/2020 at Unknown time     MAGNESIUM OXIDE PO Take 400 mg by mouth At Bedtime   3/1/2020 at Unknown time     melatonin 5 MG tablet Take 5 mg by mouth nightly as needed    3/1/2020 at Unknown time     mirtazapine (REMERON) 15 MG tablet Take 15  "mg by mouth At Bedtime   3/1/2020 at Unknown time     Nutritional Supplements (NEPRO PO) Take 1 Container by mouth 3 times daily 2-3 times daily   3/1/2020 at Unknown time     pantoprazole (PROTONIX) 40 MG EC tablet Take 40 mg by mouth daily   3/1/2020 at Unknown time     polyethylene glycol (MIRALAX/GLYCOLAX) packet Take 1 packet by mouth daily as needed for constipation   3/1/2020 at Unknown time     ritonavir (NORVIR) 100 MG capsule Take 1 capsule by mouth At Bedtime    3/1/2020 at Unknown time     sucroferric oxyhydroxide (VELPHORO) 500 MG CHEW chewable tablet Take 1,000 mg by mouth 2 times daily    3/1/2020 at Unknown time     umeclidinium (INCRUSE ELLIPTA) 62.5 MCG/INH inhaler Inhale 1 puff into the lungs daily   3/1/2020 at Unknown time     cinacalcet (SENSIPAR) 30 MG tablet Take 30 mg by mouth three times a week On dialysis days   2/28/2020     DOXERCALCIFEROL IV (given at dialysis)_   11/23/2019     epoetin brittni (EPOGEN,PROCRIT) 22983 UNIT/ML injection WITH DIALYSIS;   11/23/2019     ipratropium - albuterol 0.5 mg/2.5 mg/3 mL (DUONEB) 0.5-2.5 (3) MG/3ML neb solution Take 1 vial (3 mLs) by nebulization every 6 hours as needed for shortness of breath / dyspnea or wheezing 90 vial 1 prn     nitroglycerin (NITROSTAT) 0.4 MG SL tablet One tablet under the tongue every 5 minutes if needed for chest pain. May repeat every 5 minutes for a maximum of 3 doses in 15 minutes\" 25 tablet 3 in ambulance             Review of Systems:   The 10 point Review of Systems is negative other than noted in the HPI           Physical Exam:   Vitals were reviewed  Temp: 96.7  F (35.9  C) Temp src: Oral BP: (!) 153/84 Pulse: 89 Heart Rate: 87 Resp: 20 SpO2: 93 % O2 Device: None (Room air)      NEURO:  Alert and oriented  NAD  CHEST:  CTA bilterally  CARDIO:  RRR  S1, S2  II/VI systolic murmur heard best at base  ABD:  Soft  +BS  EXT:  No edema  Diminished pedal pulses bilaterally          Data:     Lab Results   Component Value Date "    WBC 5.8 03/02/2020    HGB 7.9 (L) 03/02/2020    HCT 26.7 (L) 03/02/2020     (L) 03/02/2020     03/02/2020    POTASSIUM 4.2 03/02/2020    CHLORIDE 102 03/02/2020    CO2 23 03/02/2020    BUN 84 (H) 03/02/2020    CR 9.90 (H) 03/02/2020     (H) 03/02/2020    SED 66 (H) 09/07/2018    DD 1.1 (H) 08/08/2019    NTBNPI 86,601 (H) 03/02/2020    TROPONIN 0.02 03/08/2018    TROPI 0.084 (H) 03/02/2020    AST 15 11/26/2019    ALT 16 11/26/2019    ALKPHOS 104 11/26/2019    BILITOTAL 0.6 11/26/2019    ISI 28 10/23/2019    INR 1.10 05/13/2019         Telemetry:  SR with first degree AV block and LBBB

## 2020-03-02 NOTE — PHARMACY-ADMISSION MEDICATION HISTORY
Admission medication history interview status for this patient is complete. See [facility] admission navigator for allergy information, prior to admission medications and immunization status.     Medication history interview source(s):Patient  Medication history resources (including written lists, pill bottles, clinic record):None  Primary pharmacy:      Changes made to PTA medication list:  Added:   Deleted: B-Complex/folic acid, omega 3, artificial tears  Changed: lantus    Actions taken by pharmacist (provider contacted, etc):None     Additional medication history information:None    Medication reconciliation/reorder completed by provider prior to medication history?  No     Do you take OTC medications (eg tylenol, ibuprofen, fish oil, eye/ear drops, etc)? Yes     For patients on insulin therapy: Y (Y/N)  Sliding scale Novolog: yes  Do you have a baseline novolog pre-meal dose:  2  units with meals   Do you eat three meals a day: usually just 2  How many times do you check your blood glucose per day:  1-2 times per day  How many episodes of hypoglycemia do you have per week: none  How many missed doses do you have per week: sometimes    Patient was vague with answers about insulin but good with other meds.    Could not tell if he was confusing the fast acting and the lantus- gave a variety of answers.   Says he likes to keep his bg's in the 120-140 range and as long as there doesn't worry so much about the insulin.    Prior to Admission medications    Medication Sig Last Dose Taking? Auth Provider   abacavir (ZIAGEN) 300 MG tablet Take 600 mg by mouth every evening  3/1/2020 at Unknown time Yes Abstract, Provider   albuterol (PROAIR HFA/PROVENTIL HFA/VENTOLIN HFA) 108 (90 BASE) MCG/ACT Inhaler Inhale 2 puffs into the lungs every 6 hours as needed for shortness of breath / dyspnea or wheezing Past Week at Unknown time Yes Nelson Worthy MD   aspirin 81 MG EC tablet Take 81 mg by mouth every evening 3/1/2020 at  Unknown time Yes Unknown, Entered By History   atorvastatin (LIPITOR) 40 MG tablet Take 40 mg by mouth At Bedtime 3/1/2020 at Unknown time Yes Unknown, Entered By History   carvedilol (COREG) 12.5 MG tablet Take 1 tablet (12.5 mg) by mouth 2 times daily (with meals) 3/1/2020 at Unknown time Yes Jovi Hayes MD   chlorhexidine (CHLORHEXIDINE) 0.12 % solution Rinse and gargle 15 ml by mouth twice a day as directed. 3/1/2020 at Unknown time Yes Abstract, Provider   CLONAZEPAM PO Take 0.5 mg by mouth nightly as needed for anxiety (restless legs) 3/1/2020 at Unknown time Yes Unknown, Entered By History   cloNIDine (CATAPRES) 0.1 MG tablet Take 0.1 mg by mouth daily as needed (for high blood pressure) If sbp > 180 Past Week at Unknown time Yes Unknown, Entered By History   dapsone 100 MG tablet Take 100 mg by mouth At Bedtime  3/1/2020 at Unknown time Yes Reported, Patient   Darunavir Ethanolate (PREZISTA PO) Take 800 mg by mouth At Bedtime. 3/1/2020 at Unknown time Yes Reported, Patient   dolutegravir (TIVICAY) 50 MG tablet Take 50 mg by mouth At Bedtime 3/1/2020 at Unknown time Yes Unknown, Entered By History   gabapentin (NEURONTIN) 300 MG capsule Take 300 mg by mouth as needed May take after HD. Past Week at Unknown time Yes Unknown, Entered By History   gabapentin (NEURONTIN) 300 MG capsule Take 300 mg by mouth 2 times daily  3/1/2020 at Unknown time Yes Unknown, Entered By History   guaiFENesin (MUCINEX) 600 MG 12 hr tablet Take 1,200 mg by mouth 2 times daily as needed  Past Week at Unknown time Yes Unknown, Entered By History   imiquimod (ALDARA) 5 % cream Apply topically as needed Past Week at Unknown time Yes Reported, Patient   insulin glargine (LANTUS PEN) 100 UNIT/ML pen Inject 3 Units Subcutaneous 2 times daily as needed for other Past Week at Unknown time Yes Unknown, Entered By History   insulin lispro (HUMALOG PENFILL) 100 UNIT/ML Cartridge Inject 2 Units Subcutaneous 3 times daily (before meals)  Past Week at Unknown time Yes Jovi Hayes MD   insulin lispro (HUMALOG VIAL) 100 UNIT/ML vial Inject Subcutaneous 3 times daily (before meals) Takes 2 units for every 50 BG above 150. Past Week at Unknown time Yes Jovi Hayes MD   irbesartan (AVAPRO) 300 MG tablet Take 1 tablet (300 mg) by mouth At Bedtime 3/1/2020 at Unknown time Yes Clemencia Pal MD   Isosorbide Mononitrate  MG TB24 Take 1 tablet (120 mg) by mouth every evening 3/1/2020 at Unknown time Yes Yuki Hinojosa E, APRN CNP   ketoconazole (NIZORAL) 2 % cream Apply topically daily Between toes for fungal infection 3/1/2020 at Unknown time Yes Reported, Patient   MAGNESIUM OXIDE PO Take 400 mg by mouth At Bedtime 3/1/2020 at Unknown time Yes Unknown, Entered By History   melatonin 5 MG tablet Take 5 mg by mouth nightly as needed  3/1/2020 at Unknown time Yes Reported, Patient   mirtazapine (REMERON) 15 MG tablet Take 15 mg by mouth At Bedtime 3/1/2020 at Unknown time Yes Reported, Patient   Nutritional Supplements (NEPRO PO) Take 1 Container by mouth 3 times daily 2-3 times daily 3/1/2020 at Unknown time Yes Unknown, Entered By History   pantoprazole (PROTONIX) 40 MG EC tablet Take 40 mg by mouth daily 3/1/2020 at Unknown time Yes Unknown, Entered By History   polyethylene glycol (MIRALAX/GLYCOLAX) packet Take 1 packet by mouth daily as needed for constipation 3/1/2020 at Unknown time Yes Reported, Patient   ritonavir (NORVIR) 100 MG capsule Take 1 capsule by mouth At Bedtime  3/1/2020 at Unknown time Yes Reported, Patient   sucroferric oxyhydroxide (VELPHORO) 500 MG CHEW chewable tablet Take 1,000 mg by mouth 2 times daily  3/1/2020 at Unknown time Yes Unknown, Entered By History   umeclidinium (INCRUSE ELLIPTA) 62.5 MCG/INH inhaler Inhale 1 puff into the lungs daily 3/1/2020 at Unknown time Yes Unknown, Entered By History   cinacalcet (SENSIPAR) 30 MG tablet Take 30 mg by mouth three times a week On dialysis days 2/28/2020  Unknown,  "Entered By History   DOXERCALCIFEROL IV (given at dialysis)_   Reported, Patient   epoetin brittni (EPOGEN,PROCRIT) 75309 UNIT/ML injection WITH DIALYSIS;   Unknown, Entered By History   ipratropium - albuterol 0.5 mg/2.5 mg/3 mL (DUONEB) 0.5-2.5 (3) MG/3ML neb solution Take 1 vial (3 mLs) by nebulization every 6 hours as needed for shortness of breath / dyspnea or wheezing   Catrachito Rosado, DO   nitroglycerin (NITROSTAT) 0.4 MG SL tablet One tablet under the tongue every 5 minutes if needed for chest pain. May repeat every 5 minutes for a maximum of 3 doses in 15 minutes\" in ambulance  Lay Paz MD         "

## 2020-03-02 NOTE — H&P
[facility] Internal Medicine  History and Physical      Patient Name: Donald Raza MRN# [id number]   Age: 70 year old YOB: 1950     Date of Admission:3/2/2020    Primary care provider: Sukh Owen  Date of Service: 3/2/2020         Assessment and Plan:   Donald Raza is a 70 year old male with a history of ESRD on HD, DM Type 2, HTN, HLD, Chronic Anemia, HIV, Aortic Stenosis, PPM, CAD s/p multiple PCI who presents to the ED today with chest pain.      Acute Chest Pain   Elevated Troponin - pt presents with acute central chest pain with radiation to the right arm and increased shortness of breath from baseline.  Troponin elevated on arrival and increased to 0.071 although lower than his recent troponin levels which appear chronically elevated.  Pain improved with nitroglycerin.  Symptoms concerning for ACS given improvement in pain with nitroglycerin and rising troponin.  Also consider 2/2 mild fluid overload.  Started on a heparin infusion in the ED.  - serial troponin levels  - telemetry monitoring  - heparin gtt per protocol  - Cardiology consult  - echocardiogram  - sl nitroglycerin and if pain not controlled may need a nitroglycerin gtt       CAD, HLD - NSTEMI in 2015 s/p stenting to LAD, repeat LAD/diagonal stenting due to in-stent restenosis (08/2016), most recent coronary angiogram in 03/018 with patent stents.  - continue ASA, Atorvastatin, Coreg, Irbesartan, Isosorbide Mononitrate    CHF - no peripheral edema on exam.  Lungs with few crackles and CXR with mild bilateral vascular congestion.  BNP elevated to 86k.  May be contributing to patient's increased shortness of breath and chest pain today.  - will consult Nephrology for HD with fluid removal today  - check echocardiogram     ESRD on HD - dialyzes via LUE fistula with last run 2/28.  Elevated BNP with increased shortness of breath and CXR with mild bilateral vascular congestion.  - Nephrology consulted for HD today    DM  Type 2 - most recent hgb A1C 10/2019 5.3.  BS on arrival 232.  - continue Gabapentin  - start ISS  - hold Lantus for now.  Reports he does not take it twice daily as prescribed and holds it on dialysis days due to developing low blood sugars.  Per pharmacy med rec he is on 3 units twice daily    HIV - continue anti-retroviral therapy    COPD - no acute exacerbation noted.  Continue Incruse Ellipta and prn Albuterol    Bradycardia s/p PPM (05/2019)    Anemia of chronic disease - stable with Hgb of 8.3    GERD - continue Protonix      CODE: Full  Diet/IVF: regular  GI ppx:  Protonix  DVT ppx: on heparin    Patient discussed with Dr. Her Vandana Perry MS PA-C  Physician Assistant   Hospitalist Service  Pager: [phone]           Chief Complaint:   Chest Pain         HPI:   70 year old male with a history of ESRD on HD, DM Type 2, HTN, HLD, Chronic Anemia, HIV, Aortic Stenosis, PPM, CAD s/p multiple PCI who presents to the ED today with chest pain.    Patient reports he awoke around 530am and was getting ready to go to dialysis.  He developed a central chest pressure with radiation to the right arm when he was walking to his car.  He had increased shortness of breath from his baseline and reports the pain was a 10/10.  His pain after nitroglycerin improved to a 2/10 and he now reports he is pain free.  He denies any abdominal pain, reflux, swelling, fevers, chills.  His last dialysis was on 2/28.    ED work up revealed patient HR 90s, -160s, 100% on room air.  Laboratory work up revealed BUN 84, Creatinine 9.9, Calcium 10.4, BNP 86k, Troponin 0.058, hgb 8.3 with otherwise normal BMP, WBC.  EKG revealed LBBB.  CXR revealed mild patchy opacity at the right infrahilar region could represent scarring from previous airspace disease versus ongoing airspace disease. Mild bilateral vascular congestion. Stable borderline cardiomegaly. No effusion.  Patient received nitroglycerin and admitted for further  management.         Past Medical History:     Past Medical History:   Diagnosis Date     Allergic rhinitis      Anemia due to chronic kidney disease 10/21/2011     CAD S/P percutaneous coronary angioplasty 06/15/2015     Cataract      CKD (chronic kidney disease)      Colon cancer      COPD      Depression      Dilated aortic root 05/06/2016     Diverticulitis      ESRD (end stage renal disease) on dialysis 05/06/2016     Gout      Human immunodeficiency virus (HIV) disease      Hx of squamous cell carcinoma 03/23/2007     Hypertension 2010     Impotence of organic origin      Increased prostate specific antigen (PSA) velocity 08/08/2016    Awaiting bx on blood thinner     Mixed hyperlipidemia      Pulmonary HTN     Mod     TIA (transient ischemic attack) 5/2016     Type 2 diabetes mellitus age 50          Past Surgical History:     Past Surgical History:   Procedure Laterality Date     ANGIOGRAM  03-04-16    No culprit lesions, stents widely patent      ANGIOGRAM  05-06-16    Cutting balloon ptca=Diag     APPENDECTOMY  2000     CHOLECYSTECTOMY, LAPOROSCOPIC       COLON SURGERY       COLOSTOMY  09/30/1999    Temporary for diverticulitis     EP PACEMAKER N/A 5/2/2019    Procedure: EP Pacemaker;  Surgeon: Angelique Perera MD;  Location:  HEART CARDIAC CATH LAB     HC LEFT HEART CATHETERIZATION  03/12/2018    No significant change  Elevated LVEDp  LVEF 30% No PCI     HEART CATH, ANGIOPLASTY  08-18-16    LAD PCI. Stented with a 3.0 x 8 mm Xience Alpine stent.     IR DIALYSIS FISTULOGRAM LEFT  1/25/2019     STENT, CORONARY, - VANITA=Diag, PTCA=LAD  12/2015     STENT, CORONARY, - VANITA=LAD  06/2015          Social History:     Social History     Socioeconomic History     Marital status:      Spouse name: Not on file     Number of children: Not on file     Years of education: Not on file     Highest education level: Not on file   Occupational History     Not on file   Social Needs     Financial resource  strain: Not on file     Food insecurity:     Worry: Not on file     Inability: Not on file     Transportation needs:     Medical: Not on file     Non-medical: Not on file   Tobacco Use     Smoking status: Former Smoker     Packs/day: 2.00     Years: 41.00     Pack years: 82.00     Types: Cigarettes     Last attempt to quit: 2003     Years since quittin.1     Smokeless tobacco: Never Used   Substance and Sexual Activity     Alcohol use: No     Alcohol/week: 0.0 standard drinks     Drug use: No     Sexual activity: Not on file   Lifestyle     Physical activity:     Days per week: Not on file     Minutes per session: Not on file     Stress: Not on file   Relationships     Social connections:     Talks on phone: Not on file     Gets together: Not on file     Attends Mandaeism service: Not on file     Active member of club or organization: Not on file     Attends meetings of clubs or organizations: Not on file     Relationship status: Not on file     Intimate partner violence:     Fear of current or ex partner: Not on file     Emotionally abused: Not on file     Physically abused: Not on file     Forced sexual activity: Not on file   Other Topics Concern     Parent/sibling w/ CABG, MI or angioplasty before 65F 55M? Yes      Service Not Asked     Blood Transfusions Not Asked     Caffeine Concern Yes     Comment: 2 cups daily     Occupational Exposure Not Asked     Hobby Hazards Not Asked     Sleep Concern Yes     Stress Concern Not Asked     Weight Concern Not Asked     Special Diet No     Comment:  more proteins     Back Care Not Asked     Exercise Yes     Comment: Cardiac rehab      Bike Helmet Not Asked     Seat Belt Not Asked     Self-Exams Not Asked   Social History Narrative     Not on file          Family History:     Family History   Problem Relation Age of Onset     Heart Disease Brother 40        CABG     Kidney Disease Sister      Hypertension Sister      Heart Disease Brother         Dilated aorta           Allergies:      Allergies   Allergen Reactions     Calcium Acetate Other (See Comments)     Other reaction(s): Other, see comments  Pain in chest and back  Pain in chest area (sensitive skin)      Contrast Dye Other (See Comments)     Contrast nephropathy     Diagnostic X-Ray Materials Other (See Comments)     PN: renal failure     Lisinopril      Sulfa Drugs           Medications:     Prior to Admission medications    Medication Sig Last Dose Taking? Auth Provider   abacavir (ZIAGEN) 300 MG tablet Take 600 mg by mouth every evening    Abstract, Provider   albuterol (PROAIR HFA/PROVENTIL HFA/VENTOLIN HFA) 108 (90 BASE) MCG/ACT Inhaler Inhale 2 puffs into the lungs every 6 hours as needed for shortness of breath / dyspnea or wheezing   Nelson Worthy MD   aspirin 81 MG EC tablet Take 81 mg by mouth every evening   Unknown, Entered By History   atorvastatin (LIPITOR) 40 MG tablet Take 40 mg by mouth At Bedtime   Unknown, Entered By History   B COMPLEX-C-FOLIC ACID PO Take 1 tablet by mouth At Bedtime    Reported, Patient   carvedilol (COREG) 12.5 MG tablet Take 1 tablet (12.5 mg) by mouth 2 times daily (with meals)   Jovi Hayes MD   chlorhexidine (CHLORHEXIDINE) 0.12 % solution Rinse and gargle 15 ml by mouth twice a day as directed.   Abstract, Provider   cinacalcet (SENSIPAR) 30 MG tablet Take 30 mg by mouth three times a week On dialysis days   Unknown, Entered By History   CLONAZEPAM PO Take 0.5 mg by mouth nightly as needed for anxiety (restless legs)   Unknown, Entered By History   cloNIDine (CATAPRES) 0.1 MG tablet Take 0.1 mg by mouth daily as needed (for high blood pressure)    Unknown, Entered By History   dapsone 100 MG tablet Take 100 mg by mouth At Bedtime    Reported, Patient   Darunavir Ethanolate (PREZISTA PO) Take 800 mg by mouth At Bedtime.   Reported, Patient   dolutegravir (TIVICAY) 50 MG tablet Take 50 mg by mouth At Bedtime   Unknown, Entered By History   DOXERCALCIFEROL  IV Inject 1 mcg into the vein three times a week (with dialysis); per [facility] dialysis records 10/15/19   Reported, Patient   epoetin brittni (EPOGEN,PROCRIT) 39898 UNIT/ML injection Inject 15,000 Units Subcutaneous three times a week WITH DIALYSIS; per [facility] dialysis records 10/15/19   Unknown, Entered By History   gabapentin (NEURONTIN) 300 MG capsule Take 300 mg by mouth as needed May take after HD.   Unknown, Entered By History   gabapentin (NEURONTIN) 300 MG capsule Take 300 mg by mouth 2 times daily    Unknown, Entered By History   guaiFENesin (MUCINEX) 600 MG 12 hr tablet Take 1,200 mg by mouth 2 times daily as needed    Unknown, Entered By History   hypromellose-dextran (ARTIFICAL TEARS) 0.1-0.3 % ophthalmic solution Place 1 drop into both eyes daily as needed for dry eyes    Unknown, Entered By History   imiquimod (ALDARA) 5 % cream Apply topically as needed   Reported, Patient   insulin glargine (LANTUS PEN) 100 UNIT/ML pen Inject 9 Units Subcutaneous 2 times daily   Jovi Hayes MD   insulin lispro (HUMALOG PENFILL) 100 UNIT/ML Cartridge Inject 2 Units Subcutaneous 3 times daily (before meals)   Jovi Hayes MD   insulin lispro (HUMALOG VIAL) 100 UNIT/ML vial Inject Subcutaneous 3 times daily (before meals) Takes 2 units for every 50 BG above 150.   Jovi Hayes MD   ipratropium - albuterol 0.5 mg/2.5 mg/3 mL (DUONEB) 0.5-2.5 (3) MG/3ML neb solution Take 1 vial (3 mLs) by nebulization every 6 hours as needed for shortness of breath / dyspnea or wheezing   Catrachito Rosado DO   irbesartan (AVAPRO) 300 MG tablet Take 1 tablet (300 mg) by mouth At Bedtime   Clemencia Pal MD   Isosorbide Mononitrate  MG TB24 Take 1 tablet (120 mg) by mouth every evening   Yuki Hinojosa APRN CNP   ketoconazole (NIZORAL) 2 % cream Apply topically daily Between toes for fungal infection   Reported, Patient   MAGNESIUM OXIDE PO Take 400 mg by mouth At Bedtime   Unknown, Entered By History  "  melatonin 5 MG tablet Take 5 mg by mouth nightly as needed    Reported, Patient   mirtazapine (REMERON) 15 MG tablet Take 15 mg by mouth At Bedtime   Reported, Patient   nitroglycerin (NITROSTAT) 0.4 MG SL tablet One tablet under the tongue every 5 minutes if needed for chest pain. May repeat every 5 minutes for a maximum of 3 doses in 15 minutes\"   Lay Paz MD   Nutritional Supplements (NEPRO PO) Take 1 Container by mouth 3 times daily 2-3 times daily   Unknown, Entered By History   omega-3 acid ethyl esters (LOVAZA) 1 G capsule Take 2 g by mouth 2 times daily   Unknown, Entered By History   pantoprazole (PROTONIX) 40 MG EC tablet Take 40 mg by mouth daily   Unknown, Entered By History   polyethylene glycol (MIRALAX/GLYCOLAX) packet Take 1 packet by mouth daily as needed for constipation   Reported, Patient   ritonavir (NORVIR) 100 MG capsule Take 1 capsule by mouth At Bedtime    Reported, Patient   sucroferric oxyhydroxide (VELPHORO) 500 MG CHEW chewable tablet Take 1,000 mg by mouth 2 times daily    Unknown, Entered By History   umeclidinium (INCRUSE ELLIPTA) 62.5 MCG/INH inhaler Inhale 1 puff into the lungs daily   Unknown, Entered By History          Review of Systems:   A complete ROS was performed and is negative other than what is stated in the HPI.       Physical Exam:   Blood pressure (!) 142/88, pulse 89, resp. rate 28, weight 87 kg (191 lb 12.8 oz), SpO2 97 %.  General: Alert, interactive, NAD, sleeping in bed  HEENT: AT/NC, sclera anicteric, PERRL, EOMI  Chest/Resp: bibasilar crackles, no wheezes  Heart/CV: regular rate and rhythm, 2/6 systolic murmur.  PPM noted on chest.  No reproducible chest wall pain  Abdomen/GI: Soft, nontender, nondistended. +BS.  No rebound or guarding.  Extremities/MSK: No LE edema  Skin: Warm and dry  Neuro: Alert & oriented x 3, no focal deficits, moves all extremities equally         Labs:   ROUTINE ICU LABS (Last four results)  CMP  Recent Labs   Lab 03/02/20  0702 "      POTASSIUM 4.2   CHLORIDE 102   CO2 23   ANIONGAP 12   *   BUN 84*   CR 9.90*   GFRESTIMATED 5*   GFRESTBLACK 5*   PRABHA 10.4*     CBC  Recent Labs   Lab 03/02/20  0702   WBC 5.8   RBC 3.06*   HGB 8.3*   HCT 28.5*   MCV 93   MCH 27.1   MCHC 29.1*   RDW 18.7*             Imaging/Procedures:     Results for orders placed or performed during the hospital encounter of 03/02/20   XR Chest 2 Views    Narrative    CHEST TWO VIEWS  3/2/2020 7:53 AM     HISTORY:  Chest pain.    COMPARISON: 12/3/2019.      Impression    IMPRESSION: Stable right chest pacemaker. Removal of ET tube. Some  mild patchy opacity at the right infrahilar region could represent  scarring from previous airspace disease versus ongoing airspace  disease. Mild bilateral vascular congestion. Stable borderline  cardiomegaly. No effusion.

## 2020-03-02 NOTE — ED TRIAGE NOTES
Patient presents to ED via EMS from home due to CP. Per EMS report patient CP developed at 0600 went to dialysis, did not get treatment.       Hx 11 stents    Given SL nitrogylcerin x2   324 mg ASA

## 2020-03-02 NOTE — ED NOTES
Bed: ED12  Expected date: 3/2/20  Expected time: 6:42 AM  Means of arrival: Ambulance  Comments:  595 70M chest pain

## 2020-03-02 NOTE — PROGRESS NOTES
Assessment and Plan:   ESRD: on HD MWF at the [facility]. His EDW is 86.0 kg and he is 87.0 kg today.     Labs show K 4.2 and HCO3 23, Cr 9.90. Will run for 3.25 h, LUAF with 400 BFR, 3K and 35 HCO3, 138 Na. EPO on the run, no hectorol. Low dose heparin. Aim for 1.5 - 2.0 kg UF given chest sx and increased wt. Exam does not show much fluid overload.      Interval History:   Chest pain: hx of ASCVD, aortic stenosis, PCI/stents in the past, pacer. On heparin. Per IM and Cards.  HT  DM  HIV infection               Review of Systems:   C/O chest pain and SOB. No recent problems on dialysis.           Medications:           HEParin 900 Units/hr (03/02/20 1121)     Current active medications and PTA medications reviewed, see medication list for details.            Physical Exam:   Vitals were reviewed  Patient Vitals for the past 24 hrs:   BP Pulse Heart Rate Resp SpO2 Weight   03/02/20 1115 (!) 147/76 89 88 25 99 % --   03/02/20 1100 (!) 145/83 90 87 14 97 % --   03/02/20 1045 (!) 144/79 89 90 24 95 % --   03/02/20 1030 (!) 150/88 92 93 17 96 % --   03/02/20 1027 -- -- -- -- -- 87 kg (191 lb 12.8 oz)   03/02/20 1015 (!) 150/85 93 92 25 95 % --   03/02/20 1000 (!) 142/81 92 90 26 97 % --   03/02/20 0945 (!) 142/88 89 86 28 97 % --   03/02/20 0930 127/72 86 90 16 97 % --   03/02/20 0915 (!) 156/92 96 96 13 96 % --   03/02/20 0900 (!) 143/78 93 93 18 96 % --   03/02/20 0845 (!) 149/95 -- 93 26 96 % --   03/02/20 0830 -- -- 93 28 97 % --   03/02/20 0815 -- -- 90 28 97 % --   03/02/20 0800 (!) 154/87 85 85 14 100 % --   03/02/20 0745 -- -- 95 21 97 % --   03/02/20 0730 -- -- 92 28 96 % --   03/02/20 0715 -- -- 92 25 98 % --   03/02/20 0700 (!) 160/88 92 -- -- 97 % --   03/02/20 0657 -- -- 100 20 100 % --       Pulse:  [85-96] 89  Heart Rate:  [] 88  Resp:  [13-28] 25  BP: (127-160)/(72-95) 147/76  SpO2:  [95 %-100 %] 99 %    Temperatures:  Current -  ; Max - No data recorded  Respiration range: Resp  Avg:  22  Min: 13  Max: 28  Pulse range: Pulse  Av.5  Min: 85  Max: 96  Blood pressure range: Systolic (24hrs), Av , Min:127 , Max:160   ; Diastolic (24hrs), Av, Min:72, Max:95    Pulse oximetry range: SpO2  Av %  Min: 95 %  Max: 100 %    No intake/output data recorded.    No intake or output data in the 24 hours ending 20 1140    Alert and responsive  NC O2  Lungs with clear ant BS  Cor with nl S1 S2, 2/6 sys M across the precordium  LE no edema  LUAF with good pulse       Wt Readings from Last 4 Encounters:   20 87 kg (191 lb 12.8 oz)   19 82.1 kg (181 lb)   19 86.5 kg (190 lb 11.2 oz)   10/26/19 89.3 kg (196 lb 14.4 oz)          Data:          Lab Results   Component Value Date     2020     2019     2019    Lab Results   Component Value Date    CHLORIDE 102 2020    CHLORIDE 91 2019    CHLORIDE 95 2019        Lab Results   Component Value Date    BUN 84 2020    BUN 91 2019    BUN 97 2019        Lab Results   Component Value Date    POTASSIUM 4.2 2020    POTASSIUM 5.0 2019    POTASSIUM 5.2 2019    Lab Results   Component Value Date    CO2 23 2020    CO2 22 2019    CO2 26 2019    Lab Results   Component Value Date    CR 9.90 2020    CR 9.49 2019    CR 6.94 2019        Recent Labs   Lab Test 20  0702 19  0825 19  0648   WBC 5.8 22.5* 6.3   HGB 8.3* 10.3*  11.6* 8.2*   HCT 28.5* 34.1* 28.0*   MCV 93 93 93    340 146*     Recent Labs   Lab Test 19  2213 10/23/19  1256 10/18/19  0546 19  0020  14  2255   AST 15  --  15 18   < > 18   ALT 16  --  12 18   < > 17   ALKPHOS 104  --  76 123   < > 132   BILITOTAL 0.6  --  0.5 0.4   < > 0.5   BILICONJ  --   --   --   --   --  0.0   ISI  --  28  --   --   --   --     < > = values in this interval not displayed.       Recent Labs   Lab Test 19  0825 19  3240  10/22/19  1423   MAG 2.9* 2.2 2.3     Recent Labs   Lab Test 11/26/19  2213 10/26/19  0630 10/24/19  0825   PHOS 4.1 5.9* 6.8*     Recent Labs   Lab Test 03/02/20  0702 12/03/19  0825 11/27/19  0648   PRABHA 10.4* 10.3* 9.2       Lab Results   Component Value Date    PRABHA 10.4 (H) 03/02/2020     Lab Results   Component Value Date    WBC 5.8 03/02/2020    HGB 8.3 (L) 03/02/2020    HCT 28.5 (L) 03/02/2020    MCV 93 03/02/2020     03/02/2020     Lab Results   Component Value Date     03/02/2020    POTASSIUM 4.2 03/02/2020    CHLORIDE 102 03/02/2020    CO2 23 03/02/2020     (H) 03/02/2020     Lab Results   Component Value Date    BUN 84 (H) 03/02/2020    CR 9.90 (H) 03/02/2020     Lab Results   Component Value Date    MAG 2.9 (H) 12/03/2019     Lab Results   Component Value Date    PHOS 4.1 11/26/2019       Creatinine   Date Value Ref Range Status   03/02/2020 9.90 (H) 0.66 - 1.25 mg/dL Final   12/03/2019 9.49 (H) 0.66 - 1.25 mg/dL Final   11/27/2019 6.94 (H) 0.66 - 1.25 mg/dL Final   11/26/2019 6.08 (H) 0.66 - 1.25 mg/dL Final   11/26/2019 8.79 (H) 0.66 - 1.25 mg/dL Final   10/26/2019 9.26 (H) 0.66 - 1.25 mg/dL Final       Attestation:  I have reviewed today's vital signs, notes, medications, labs and imaging.  Seen on dialysis.      Eric Bradford MD

## 2020-03-02 NOTE — PLAN OF CARE
Pt admitted to unit at 1200, VSS on RA sating at 93%, pt CP was 3/10 but is now 1/10, LS clear, hep gtt funning at 9 mL/hr, SBA, reg diet, pt went to dialysis at 1315, creatinine-9.90, BNP=86,601, hem-7.9 down from 8.3, trop-0.084 up from 0.071, no timeline for discharge.

## 2020-03-02 NOTE — ED PROVIDER NOTES
History     Chief Complaint:  Chest Pain    HPI   Donald Raza is a 70 year old male with complex past medical history including coronary artery disease with numerous stents, COPD, end-stage renal disease on dialysis Monday Wednesday and Friday, chronic anemia, hypertension, TIA and type 2 diabetes who presents with chest pain that began at around 5:00 this morning while he was walking to his car.  Patient reports that he woke up this morning feeling well and was walking to his car to get to dialysis this morning when he had chest pain that radiated to his right arm with associated numbness and tingling.  He also notes associated shortness of breath and nausea.  Denies any vomiting.  He was able to arrive at his dialysis appointment where he described his symptoms to the nurse who then called 911 and had the patient transported here for further evaluation and treatment.  In route the patient received aspirin and nitroglycerin with almost complete resolution of his symptoms.  Upon evaluation here he still having some dull substernal chest pain that occasionally radiates to his right arm and back.  He denies any significant numbness and weakness in his right arm or right leg.  He has not been sick with any cough, cold or flulike symptoms recently.    Allergies:  Calcium Acetate  Contrast Dye  Diagnostic X-Ray Materials  Lisinopril  Sulfa Drugs    Medications:    Ziagen  Albuterol  Aspirin 81 mg  Lipitor  Coreg  Sensipar  Clonazepam  Catapres  Dapsone  Prezista  Tivicay  Doxercalciferol  Epogen  Neurontin  Mucinex  Duoneb  Avapro  Isosorbide mononitrate  Magnesium oxide  Melatonin  Remeron  Nitrostat  Nepro  Lovaza  Protonix  Miralax  Norvir  Velphoro  Incruse ellipta     Past Medical History:    Allergic rhinitis  Anemia  coronary artery disease   Cataract  Chronic kidney disease  Colon cancer  COPD  Depression  Dilated aortic root  Diverticulitis  Gout  HIV  Squamous cell  carcinoma  Hypertension  Impotence  Increased prostate specific antigen  Hyperlipidemia  Transient ischemic attack  Diabetes type II  Sepsis  Angina  Pneumonia   Aphasia     Past Surgical History:    Angiogram x2  Appendectomy  Cholecystectomy  Colon surgery  Colostomy  Pacemaker  Left heart catheretization  Angioplasty  Dialysis fistulogram  Coronary stent x2    Family History:    Brothers- CABG, heart disease  Sister- kidney disease, hypertension    Social History:  Smoking Status: Former  Smokeless Tobacco: Never  Alcohol Use: No  Drug Use: No   Marital Status:        Review of Systems   Constitutional: Negative for chills and fever.   HENT: Negative for congestion.    Respiratory: Positive for shortness of breath.    Cardiovascular: Positive for chest pain.   Gastrointestinal: Positive for nausea. Negative for abdominal pain and vomiting.   Musculoskeletal: Negative for back pain.   Neurological: Negative for dizziness, weakness and light-headedness.   All other systems reviewed and are negative.        Physical Exam   Vitals:  Patient Vitals for the past 24 hrs:   BP Pulse Heart Rate Resp SpO2   03/02/20 0845 (!) 149/95 -- 93 26 96 %   03/02/20 0830 -- -- 93 28 97 %   03/02/20 0815 -- -- 90 28 97 %   03/02/20 0800 (!) 154/87 85 85 14 100 %   03/02/20 0745 -- -- 95 21 97 %   03/02/20 0730 -- -- 92 28 96 %   03/02/20 0715 -- -- 92 25 98 %   03/02/20 0700 (!) 160/88 92 -- -- 97 %   03/02/20 0657 -- -- 100 20 100 %     Physical Exam  General: Patient is awake, alert and interactive when I enter the room  Head: The scalp, face, and head appear normal  Eyes: The pupils are equal, round, and reactive to light. Conjunctivae and sclerae are normal  Neck: Normal range of motion. No anterior cervical lymphadenopathy noted  CV: Regular rate. S1/S2. No murmurs. Peripheral pulses including radial pulses are symmetric.  Normal, palpable pulses in the right lower extremity.  Resp: Lungs are clear without wheezes or  rales. No respiratory distress.   GI: Abdomen is soft, no rigidity, guarding, or rebound. No distension. No tenderness to palpation in any quadrant.     MS: Chest wall is non tender to palpation. Normal tone. Joints grossly normal without effusions. No asymmetric leg swelling, calf or thigh tenderness.    Skin: No rash or lesions noted. Normal capillary refill noted  Neuro: Speech is normal and fluent. Face is symmetric. Moving all extremities.  Normal strength in bilateral lower extremities.  Psych:  Normal affect.  Appropriate interactions.    Emergency Department Course   ECG:  Indication: Chest pain  Completed at 0700.  Read at 0707.   Rate 97 bpm. PA interval *. QRS duration 166. QT/QTc 404/513. P-R-T axes * 1 137.  Possible junctional rhythm  Left bundle branch block    #2  Indication: Chest Pain  Completed at 0814.  Read at 0820.   Rate 90 bpm. PA interval 238. QRS duration 166. QT/QTc 414/506. P-R-T axes 38 -6 135.  Sinus rhythm with 1st degree AV block  left bundle branch block  No significant change when compared to EKG dated 03/02/2020.    Imaging:  Radiographic findings were communicated with the patient who voiced understanding of the findings.  XR Chest 2 Views   Preliminary Result   IMPRESSION: Stable right chest pacemaker. Removal of ET tube. Some   mild patchy opacity at the right infrahilar region could represent   scarring from previous airspace disease versus ongoing airspace   disease. Mild bilateral vascular congestion. Stable borderline   cardiomegaly. No effusion.          Laboratory:  Labs Ordered and Resulted from Time of ED Arrival Up to the Time of Departure from the ED   CBC WITH PLATELETS DIFFERENTIAL - Abnormal; Notable for the following components:       Result Value    RBC Count 3.06 (*)     Hemoglobin 8.3 (*)     Hematocrit 28.5 (*)     MCHC 29.1 (*)     RDW 18.7 (*)     All other components within normal limits   BASIC METABOLIC PANEL - Abnormal; Notable for the following  components:    Glucose 232 (*)     Urea Nitrogen 84 (*)     Creatinine 9.90 (*)     GFR Estimate 5 (*)     GFR Estimate If Black 5 (*)     Calcium 10.4 (*)     All other components within normal limits   TROPONIN I - Abnormal; Notable for the following components:    Troponin I ES 0.058 (*)     All other components within normal limits   NT PROBNP INPATIENT - Abnormal; Notable for the following components:    N-Terminal Pro BNP Inpatient 86,601 (*)     All other components within normal limits   TROPONIN I   CARDIAC CONTINUOUS MONITORING         Impression & Plan      Medical Decision Making:  Donald Raza is a 70 year old male with complex past medical history coronary artery disease with numerous stent placements, COPD, end-stage renal disease on dialysis and hypertension who presents with chest pain shortness of breath.  On initial evaluation patient was hypertensive but otherwise hemodynamically stable.  He is afebrile and oxygenating well on room air.  Initial EKG shows left bundle branch block without significant signs of ischemia.  Repeat EKG is largely unchanged.  Initial troponin is elevated however patient chronically has elevated troponin secondary to his end-stage renal disease.  His second troponin is currently pending.  If the patient has a rising troponin we will initiate heparin therapy in the emergency department.  My clinical suspicion of acute coronary syndrome is high enough to warrant further therapy and investigation.  I will admit the patient  to medicine service for further workup.  The patient is pain free after nitroglycerin and aspirin. There is no clinical, laboratory, or radiographic evidence of pulmonary embolism, AAA, aortic dissection, pneumonia or pneumothorax. He agrees to be admitted and all questions were answered.    Diagnosis:    ICD-10-CM    1. Chest pain, unspecified type R07.9      Disposition:  Admitted to Cardiac telemetry     3/2/2020   [facility] EMERGENCY  DEPARTMENT       Julian, Glenn Matute MD  03/02/20 0975

## 2020-03-03 NOTE — PROGRESS NOTES
[facility] Cardiology Progress Note       Assessment and Plan:   1.  Chronic stable CAD, adequately revascularized  2.  Chest pain, likely multifactorial from subendocardial ischemia, diastolic dysfunction.  Mildly elevated troponin levels not consistent with ACS.  3.  End-stage renal disease, on hemodialysis.  4.  Hypertension:  suboptimal control, will increase coreg to 15.625 mg bid.  5.  Normally functioning pacemaker.  6.  Moderate to severe aortic stenosis, no change compared with previously.  7.  HIV+:  On anti viral therapy.            Interval History:   dialysed 2 li.  Chest pain resolved.                  Medications:     Current Facility-Administered Medications   Medication     - MEDICATION INSTRUCTIONS for Dialysis Patients -     abacavir (ZIAGEN) tablet 600 mg     acetaminophen (TYLENOL) tablet 650 mg     albuterol (PROAIR HFA/PROVENTIL HFA/VENTOLIN HFA) 108 (90 Base) MCG/ACT inhaler 2 puff     aspirin EC tablet 81 mg     atorvastatin (LIPITOR) tablet 40 mg     carvedilol (COREG) tablet 12.5 mg     cinacalcet (SENSIPAR) tablet 30 mg     dapsone (ACZONE) tablet 100 mg     darunavir (PREZISTA) tablet 800 mg     glucose gel 15-30 g    Or     dextrose 50 % injection 25-50 mL    Or     glucagon injection 1 mg     dolutegravir (TIVICAY) tablet 50 mg     gabapentin (NEURONTIN) capsule 300 mg     insulin aspart (NovoLOG) injection (RAPID ACTING)     insulin aspart (NovoLOG) injection (RAPID ACTING)     irbesartan (AVAPRO) tablet 300 mg     isosorbide mononitrate (IMDUR) 24 hr tablet 120 mg     lidocaine (LMX4) cream     lidocaine 1 % 0.1-1 mL     melatonin tablet 1 mg     mirtazapine (REMERON) tablet 15 mg     naloxone (NARCAN) injection 0.1-0.4 mg     nitroGLYcerin (NITROSTAT) sublingual tablet 0.4 mg     ondansetron (ZOFRAN-ODT) ODT tab 4 mg    Or     ondansetron (ZOFRAN) injection 4 mg     pantoprazole (PROTONIX) EC tablet 40 mg     Patient is already receiving anticoagulation with heparin,  enoxaparin (LOVENOX), warfarin (COUMADIN)  or other anticoagulant medication     polyethylene glycol (MIRALAX) Packet 17 g     ritonavir (NORVIR) tablet 100 mg     senna-docusate (SENOKOT-S/PERICOLACE) 8.6-50 MG per tablet 1 tablet    Or     senna-docusate (SENOKOT-S/PERICOLACE) 8.6-50 MG per tablet 2 tablet     sodium chloride (PF) 0.9% PF flush 3 mL     sodium chloride (PF) 0.9% PF flush 3 mL     sucroferric oxyhydroxide (VELPHORO) chewable tablet 1,000 mg     umeclidinium (INCRUSE ELLIPTA) 62.5 MCG/INH inhaler 1 puff             Physical Exam:   Blood pressure (!) 156/81, pulse 100, temperature 97.7  F (36.5  C), temperature source Oral, resp. rate 20, weight 86.2 kg (190 lb), SpO2 90 %.  Wt Readings from Last 4 Encounters:   20 86.2 kg (190 lb)   19 82.1 kg (181 lb)   19 86.5 kg (190 lb 11.2 oz)   10/26/19 89.3 kg (196 lb 14.4 oz)         Vital Sign Ranges  Temperature Temp  Av.4  F (36.9  C)  Min: 97.2  F (36.2  C)  Max: 99.4  F (37.4  C)   Blood pressure Systolic (24hrs), Av , Min:121 , Max:156        Diastolic (24hrs), Av, Min:52, Max:85      Pulse Pulse  Av  Min: 100  Max: 100   Respirations Resp  Av.3  Min: 16  Max: 26   Pulse oximetry SpO2  Av.1 %  Min: 90 %  Max: 97 %         Intake/Output Summary (Last 24 hours) at 3/3/2020 1513  Last data filed at 3/3/2020 1400  Gross per 24 hour   Intake 200 ml   Output 2000 ml   Net -1800 ml          Cardiovascular:   Normal apical impulse, regular rate and rhythm, normal S1 and soft S2, 3/6 systolic murmur noted   Chest clear.  No peripheral oedema         Data:     Labs:  Lab Results   Component Value Date     2020    Lab Results   Component Value Date    CHLORIDE 100 2020    Lab Results   Component Value Date    BUN 48 2020      Lab Results   Component Value Date    POTASSIUM 3.8 2020    Lab Results   Component Value Date    CO2 26 2020    Lab Results   Component Value Date    CR  6.78 03/03/2020        Lab Results   Component Value Date    WBC 5.0 03/03/2020    HGB 7.6 (L) 03/03/2020    HCT 26.0 (L) 03/03/2020    MCV 91 03/03/2020     (L) 03/03/2020     Lab Results   Component Value Date    TROPONIN 0.02 03/08/2018    TROPI 0.080 (H) 03/03/2020           Attestation:  I have reviewed today's vital signs, notes, medications, labs and imaging.         DR SUSAN FRANKS MD 3/3/2020  3:13 PM

## 2020-03-03 NOTE — PROGRESS NOTES
[facility]    Hospitalist Progress Note  Name: Donald Raza    MRN: [id number]  Provider: Sherice Bernardo MD  Date of Service: 03/03/2020    Assessment & Plan   Summary of Stay: Donald Raza is a 70 year old male who was admitted on 3/2/2020 for evaluation of chest pain.  Patient's past medical history significant for end-stage renal disease on hemodialysis, type 2 diabetes mellitus, hypertension, hyperlipidemia, chronic anemia, HIV, aortic stenosis, coronary artery disease status post angioplasty presented with chest pain.    Patient has had chest pain chronically intermittently.  Discussed with cardiologist recommends blood transfusion or iron supplementation to help treatment of anemia.  Will discuss with nephrology.    Chest pain with associated troponin elevation  -Peak troponin was 0.08  -EKG left bundle branch block  -Prior history of extensive coronary artery disease status post LAD and diagonal stenting in 2015 status post LAD and diagonal stenting due to in-stent stenosis in 2016 status post VANITA stenting on 12/9/2019 and a patent LAD and diagonal stents noted at that time.  -Cardiology consulted did not recommend repeat angiogram or nuclear test for now  -Chronically elevated troponin likely due to end-stage renal disease and fluid overload in setting of severe anemia  -Chest pain resolved with sublingual nitroglycerin  -Cardiology following  -Cardiac echo pending  -Continue sublingual nitroglycerin, IV heparin discontinued after recommendation from cardiologist   -We will continue on aspirin atorvastatin Coreg , ARB, and isosorbide mononitrate    CHF  -On admission with known peripheral edema and few basilar crackles.  Chest x-ray with mild bibasilar congestion  -Patient followed by nephrology hemodialysis   -echocardiogram pending    End-stage renal disease on hemodialysis  -Patient will benefit with increased volume removal for symptomatic relief  -Dialysis Monday Wednesday  Friday    Diabetes mellitus type 2   -Last hemoglobin A1c was 5.3  -Patient on sliding scale    HIV  -Continue prior to admission antiviral therapy    COPD  -No history of exacerbation  -Continue prior to admission long-acting inhalers and albuterol    History of bradycardia   -status post pacemaker placement on 05/20/2019    Anemia of chronic disease  -Hemoglobin on admission 8.3  -Trending down to 7.6 this morning  -Patient seems to be symptomatic.  Will defer to nephrology to consider transfusion with dialysis or iron infusion      DVT Prophylaxis: on heparin  Code Status: Full Code    Disposition: Expected discharge 1 to 2 days if remains pain-free      Interval History   Assumed care reviewed chart.  Patient's hemoglobin is drifting down with chronic renal failure.  Had an episode of chest pain.  Which resolved spontaneously with rest.  Discussed with cardiology at length.  Patient is symptomatic because of his anemia and would benefit from optimization.  Patient denies any chest pain at this time.  No pressure heaviness no tightness.  Review of all the other symptoms are negative.    -Data reviewed today: I reviewed all new labs and imaging reports over the last 24 hours. I personally reviewed no images or EKG's today.    Physical Exam   Temp: 98.4  F (36.9  C) Temp src: Oral BP: 131/64 Pulse: 100 Heart Rate: 87 Resp: 18 SpO2: 93 % O2 Device: None (Room air)    Vitals:    03/02/20 1027 03/02/20 1405 03/03/20 0700   Weight: 87 kg (191 lb 12.8 oz) 88.2 kg (194 lb 8 oz) 86.2 kg (190 lb)     Vital Signs with Ranges  Temp:  [96.7  F (35.9  C)-99.4  F (37.4  C)] 98.4  F (36.9  C)  Pulse:  [] 100  Heart Rate:  [] 87  Resp:  [13-28] 18  BP: (121-170)/() 131/64  SpO2:  [92 %-99 %] 93 %  I/O last 3 completed shifts:  In: 0   Out: 2000 [Other:2000]      GEN:  Alert, oriented x 3, appears comfortable, NAD.  HEENT:  Normocephalic/atraumatic, no scleral icterus, no nasal discharge, mouth moist.  CV:   Regular rate and rhythm, systolic murmur.  Pacemaker in place.  S1 + S2 noted, no S3 or S4.  LUNGS: Basilar crackles ABD:  Active bowel sounds, soft, non-tender/non-distended.  No rebound/guarding/rigidity.  EXT:  No edema.  No cyanosis.  No joint synovitis noted.  SKIN:  Dry to touch, no exanthems noted in the visualized areas.    Medications     HEParin 1,200 Units/hr (03/03/20 0424)     - MEDICATION INSTRUCTIONS -         - MEDICATION INSTRUCTIONS for Dialysis Patients -   Does not apply See Admin Instructions     abacavir  600 mg Oral QPM     aspirin  81 mg Oral QPM     atorvastatin  40 mg Oral At Bedtime     carvedilol  12.5 mg Oral BID w/meals     cinacalcet  30 mg Oral Once per day on Mon Wed Fri     dapsone  100 mg Oral At Bedtime     darunavir  800 mg Oral At Bedtime     dolutegravir  50 mg Oral At Bedtime     gabapentin  300 mg Oral BID     insulin aspart  1-7 Units Subcutaneous TID AC     insulin aspart  1-5 Units Subcutaneous At Bedtime     irbesartan  300 mg Oral At Bedtime     isosorbide mononitrate  120 mg Oral QPM     mirtazapine  15 mg Oral At Bedtime     pantoprazole  40 mg Oral Daily     ritonavir  100 mg Oral At Bedtime     sodium chloride (PF)  3 mL Intracatheter Q8H     sucroferric oxyhydroxide  1,000 mg Oral BID     umeclidinium  1 puff Inhalation Daily     Data     Recent Labs   Lab 03/03/20  0656 03/02/20  1203 03/02/20  0702   WBC 5.0 5.8 5.8   HGB 7.6* 7.9* 8.3*   HCT 26.0* 26.7* 28.5*   MCV 91 91 93   * 143* 154     Recent Labs   Lab 03/03/20  0656 03/02/20  0702    137   POTASSIUM 3.8 4.2   CHLORIDE 100 102   CO2 26 23   ANIONGAP 10 12   * 232*   BUN 48* 84*   CR 6.78* 9.90*   GFRESTIMATED 7* 5*   GFRESTBLACK 9* 5*   PRABHA 10.0 10.4*     No results for input(s): CULT in the last 168 hours.  Recent Labs   Lab 03/02/20  0702   NTBNPI 86,601*     Recent Labs   Lab 03/03/20  0656 03/02/20  1203 03/02/20  0702   HGB 7.6* 7.9* 8.3*     No results for input(s): AST, ALT, GGT,  ALKPHOS, BILITOTAL, BILICONJ, BILIDIRECT, ISI in the last 168 hours.    Invalid input(s): BILIRUBININDIRECT  No results for input(s): INR in the last 168 hours.  No results for input(s): LACT in the last 168 hours.  No results for input(s): LIPASE in the last 168 hours.  No results for input(s): CHOL, HDL, LDL, TRIG, CHOLHDLRATIO in the last 168 hours.  No results for input(s): TSH in the last 168 hours.  Recent Labs   Lab 03/03/20  0002 03/02/20  1727 03/02/20  1203   TROPI 0.080* 0.077* 0.084*     No results for input(s): COLOR, APPEARANCE, URINEGLC, URINEBILI, URINEKETONE, SG, UBLD, URINEPH, PROTEIN, UROBILINOGEN, NITRITE, LEUKEST, RBCU, WBCU in the last 168 hours.    No results found for this or any previous visit (from the past 24 hour(s)).

## 2020-03-03 NOTE — PROVIDER NOTIFICATION
"\"Pt is complaining of pressure when he swallows, chest pressure and tightness, denies shortness of breaths, pain of 2/10. Pt do not have an order for EKG 12 lead. Please advice. Thank you\". Waiting for response at this time.  Call back number [phone]     Per Dr. Johnson, no indication for 12 lead EKG, aware of pt's ongoing status.    9:58 PM   sent text page to admitting hospitalist:    \"Pt again having worsening CP, SOB. Did receive ntg approx. 2 hrs prior, minimal improvement. Please advise. \"    Call back number provided, awaiting new orders/response. Continue to monitor.   "

## 2020-03-03 NOTE — CONSULTS
Care Transition Initial Assessment - RN        Met with: Patient.  DATA   Active Problems:    Acute chest pain       Cognitive Status: awake, alert and oriented.  Primary Care Clinic Name: [facility]   Primary Care MD Name: Sukh Rivera  Contact information and PCP information verified: Yes  Lives With: spouse, sibling(s)   Living Arrangements: house  Quality of Family Relationships: helpful, involved, supportive  Description of Support System: Supportive, Involved   Who is your support system?: Wife, Children, Sibling(s)   Support Assessment: Adequate family and caregiver support   Insurance concerns: No Insurance issues identified  ASSESSMENT  Patient currently receives the following services:    Pt lives at home with his wife and brother. He has [facility] RN services. He dialyzes at [facility] in [place] on MWF schedule.       Identified issues/concerns regarding health management: none, pt has multiple chronic conditions    CTS following for Elevated MUSHTAQ, CHF diagnosis and discharge planning. Met with pt at bedside to discuss plan of care. Pt is well known to CTS services from previous admissions. Pt has received CHF education a number of times. Plan of care was reviewed with pt. Pt states CP is what brought him in. He was admitted with BNP 18831. He is being followed by Cards, Neph. He verifies that he still lives at home with his wife and brother. He has [facility] RN services. He goes to dialysis on MWF at [facility]. His daughter takes him to dialysis in the mornings and his wife or brother picks him up. He is very tired on HD days and sleeps most of the day after dialysis. He uses a walker for ambulating. He follows a renal, low Na diet at home. Anticipate he will be here another 1-2 days and discharge back home with same services. He does not have any questions or concerns with discharge.    PLAN  Patient/family is agreeable to the plan?  Yes  Patient anticipates discharging to home with  wife, brother and [facility] RN .        Patient anticipates needs for home equipment: No  Transportation/person available to transport on day of discharge  is family.    Plan/Disposition: Home   Appointments:     Thurs 3/12 at 1040 with Dr Ny at [facility] [place]. Pt's PCP is not available on non dialysis days.     Care  (CTS) will continue to follow as needed. No handoff needed as there were no gaps in care. Will cont to follow for discharge needs.    Karen Botello RN BSN CM  Inpatient Care Coordination  [facility]  [phone]

## 2020-03-03 NOTE — PLAN OF CARE
Pt is alert and oriented x4 and making needs known appropriately. VSS, stated improvement from previous chest pain episodes with x3 doses of PRN nitroglycerin administered earlier this shift. Pt denies chest pain, denies shortness of breath at this time. Scheduled one time dose of dilaudid administered x1 this shift with relief. Pt slept through the night. IV heparin infusing, Hep 10a this shift is 0.080, no change in plane per MD.Hep dose rate adjusted to 1200units/ml per pharmacy. Fistular site intact, no drainage on site. Safety provided and call light is within reach.

## 2020-03-03 NOTE — PROGRESS NOTES
Pt alert and oriented. Renal diet. Assist with 1, gb  CP 1-3.   Heparin drip discontinued.   Tele-SR 1st avb.   Skin wnl.   Cardiology and Nephrology following  BP (!) 156/81 (BP Location: Left arm)   Pulse 100   Temp 97.7  F (36.5  C) (Oral)   Resp 20   Wt 86.2 kg (190 lb)   SpO2 90%   BMI 26.50 kg/m

## 2020-03-03 NOTE — PROGRESS NOTES
Pt is alert and oriented x4, c/o chest pressure and tightness with swallowing. MD updated, PRN nitroglycerin administered at this time, VS are within normal for pt's condition at this time, denies shortness of breaths. Will continue to monitor.

## 2020-03-03 NOTE — PROGRESS NOTES
Cross cover notified of troponin of 0.08.  Patient known to me from numerous previous admissions.  Reviewed chart.  Serial troponin all and 0.07-0.08 range.  Reviewed cardiology note.  Currently on heparin drip.  No change to plan.

## 2020-03-03 NOTE — PROVIDER NOTIFICATION
"\"Just to inform  you that pt continues to c/o intermittent midsternal pain and pressure, mild c/o SOB.PRN Nitro administered x3 now, plus Tylenol. Please come see patient. Thank you.\"  Wanting for a response at this time.  "

## 2020-03-03 NOTE — PROVIDER NOTIFICATION
03/02/20 2227   Significant Event   Significant Event Critical result/value  (Critical Lactic Acid: 2.1. Please advise. )     Text page with above information sent to Admitting Hospitalist. Call back number provided, awaiting new response. Continue to monitor.

## 2020-03-03 NOTE — PROGRESS NOTES
Renal Medicine       Dialyzed yesterday without issue  UF 2 liter    Comfortable today  Off O2  No SOB    No additional dialysis necessary  Plan am dialysis (inpatient v outpatient)        Recent Labs   Lab 03/03/20  0656      POTASSIUM 3.8   CHLORIDE 100   CO2 26   ANIONGAP 10   *   BUN 48*   CR 6.78*   GFRESTIMATED 7*   GFRESTBLACK 9*   PRABHA 10.0           JAMAL Fuller    [facility]  [phone]

## 2020-03-04 NOTE — PROGRESS NOTES
Pt alert and oriented. Regular diet. SBA with gb.   LS clear. BP (!) 145/73   Pulse 76   Temp 98  F (36.7  C) (Oral)   Resp 18   Wt 86.2 kg (190 lb 0.6 oz)   SpO2 100%   BMI 26.50 kg/m    Tele-SR 1st AVB  Denies CP.   Pt in dialysis today, also receiving a blood transfusion during dialysis.

## 2020-03-04 NOTE — PROGRESS NOTES
[facility]    Hospitalist Progress Note  Name: Donald Raza    MRN: [id number]  Provider: Sherice Bernardo MD  Date of Service: 03/04/2020    Assessment & Plan   Summary of Stay: Donald Raza is a 70 year old male who was admitted on 3/2/2020 for evaluation of chest pain.  Patient's past medical history significant for end-stage renal disease on hemodialysis, type 2 diabetes mellitus, hypertension, hyperlipidemia, chronic anemia, HIV, aortic stenosis, coronary artery disease status post angioplasty presented with chest pain.    Patient has had chest pain chronically intermittently.  Discussed with cardiologist recommends blood transfusion or iron supplementation to help treatment of anemia.  Will discuss with nephrology with hemoglobin trending down.    Chest pain with associated troponin elevation  -Peak troponin was 0.08  -EKG left bundle branch block  -Prior history of extensive coronary artery disease status post LAD and diagonal stenting in 2015 status post LAD and diagonal stenting due to in-stent stenosis in 2016 status post VANITA stenting on 12/9/2019 and a patent LAD and diagonal stents noted at that time.  -Cardiology consulted did not recommend repeat angiogram or nuclear test for now  -Chronically elevated troponin likely due to end-stage renal disease and fluid overload in setting of severe anemia.  Transfuse 1 unit of PRBCs  -Chest pain resolved with sublingual nitroglycerin  -Cardiology following  -Cardiac echo pending  -Continue sublingual nitroglycerin, IV heparin discontinued after recommendation from cardiologist   -Increased dose of Coreg per cardiology.  Continue ARB, and isosorbide mononitrate  -We will transfuse 1 unit of PRBCs to help with symptomatic anemia    CHF  -On admission with known peripheral edema and few basilar crackles.  Chest x-ray with mild bibasilar congestion  -Patient followed by nephrology hemodialysis   -echocardiogram pending    End-stage renal disease  on hemodialysis  -Patient will benefit with increased volume removal for symptomatic relief  -Dialysis Monday Wednesday Friday    Diabetes mellitus type 2   -Last hemoglobin A1c was 5.3  -Patient on sliding scale    HIV  -Continue prior to admission antiviral therapy    COPD  -No history of exacerbation  -Continue prior to admission long-acting inhalers and albuterol    History of bradycardia   -status post pacemaker placement on 05/20/2019    Anemia of chronic disease  -Hemoglobin on admission 8.3  -Trending down to 7.2 this morning  -Transfuse 1 unit of PRBCs while on dialysis today      DVT Prophylaxis: on heparin  Code Status: Full Code    Disposition: Expected discharge 1 to 2 days if remains pain-free      Interval History   Reviewed chart.  Patient's hemoglobin is drifting down with chronic renal failure.  Denies any bleeding.  Patient was sleepy in dialysis however is agreeable for blood transfusion.  Denies any chest pain today.  Review of all the other symptoms are negative.    -Data reviewed today: I reviewed all new labs and imaging reports over the last 24 hours. I personally reviewed no images or EKG's today.    Physical Exam   Temp: 98.2  F (36.8  C) Temp src: Oral BP: 120/63   Heart Rate: 77 Resp: 20 SpO2: 96 % O2 Device: None (Room air)    Vitals:    03/02/20 1027 03/02/20 1405 03/03/20 0700   Weight: 87 kg (191 lb 12.8 oz) 88.2 kg (194 lb 8 oz) 86.2 kg (190 lb)     Vital Signs with Ranges  Temp:  [97.5  F (36.4  C)-98.5  F (36.9  C)] 98.2  F (36.8  C)  Heart Rate:  [77-92] 77  Resp:  [18-20] 20  BP: (120-156)/(61-81) 120/63  SpO2:  [90 %-96 %] 96 %  I/O last 3 completed shifts:  In: 440 [P.O.:440]  Out: -       GEN:  Alert, oriented x 3, appears comfortable, NAD.  HEENT:  Normocephalic/atraumatic, no scleral icterus, no nasal discharge, mouth moist.  CV:  Regular rate and rhythm, systolic murmur.  Pacemaker in place.  S1 + S2 noted, no S3 or S4.  LUNGS: Basilar crackles ABD:  Active bowel sounds,  soft, non-tender/non-distended.  No rebound/guarding/rigidity.  EXT:  No edema.  No cyanosis.  No joint synovitis noted.  SKIN:  Dry to touch, no exanthems noted in the visualized areas.    Medications     heparin (porcine)       - MEDICATION INSTRUCTIONS -         - MEDICATION INSTRUCTIONS for Dialysis Patients -   Does not apply See Admin Instructions     sodium chloride 0.9%  250 mL Intravenous Once in dialysis     sodium chloride 0.9%  300 mL Hemodialysis Machine Once     abacavir  600 mg Oral QPM     aspirin  81 mg Oral QPM     atorvastatin  40 mg Oral At Bedtime     carvedilol  12.5 mg Oral BID w/meals     cinacalcet  30 mg Oral Once per day on Mon Wed Fri     dapsone  100 mg Oral At Bedtime     darunavir  800 mg Oral At Bedtime     dolutegravir  50 mg Oral At Bedtime     epoetin brittni (EPOGEN,PROCRIT) inj ESRD  10,000 Units Intravenous Once in dialysis     gabapentin  300 mg Oral BID     heparin (porcine)  500 Units Hemodialysis Machine Once in dialysis     insulin aspart  1-7 Units Subcutaneous TID AC     insulin aspart  1-5 Units Subcutaneous At Bedtime     irbesartan  300 mg Oral At Bedtime     isosorbide mononitrate  120 mg Oral QPM     mirtazapine  15 mg Oral At Bedtime     pantoprazole  40 mg Oral Daily     ritonavir  100 mg Oral At Bedtime     sodium chloride (PF)  3 mL Intracatheter Q8H     sucroferric oxyhydroxide  1,000 mg Oral TID w/meals     umeclidinium  1 puff Inhalation Daily     Data     Recent Labs   Lab 03/04/20  0746 03/03/20  0656 03/02/20  1203   WBC 5.5 5.0 5.8   HGB 7.2* 7.6* 7.9*   HCT 24.5* 26.0* 26.7*   MCV 89 91 91   * 139* 143*     Recent Labs   Lab 03/04/20  0746 03/03/20  0656 03/02/20  0702   * 136 137   POTASSIUM 4.6 3.8 4.2   CHLORIDE 97 100 102   CO2 24 26 23   ANIONGAP 11 10 12   * 134* 232*   BUN 74* 48* 84*   CR 8.69* 6.78* 9.90*   GFRESTIMATED 5* 7* 5*   GFRESTBLACK 6* 9* 5*   PRABHA 10.1 10.0 10.4*     No results for input(s): CULT in the last 168  hours.  Recent Labs   Lab 20  0702   NTBNPI 86,601*     Recent Labs   Lab 20  0746 20  0656 20  1203   HGB 7.2* 7.6* 7.9*     No results for input(s): AST, ALT, GGT, ALKPHOS, BILITOTAL, BILICONJ, BILIDIRECT, ISI in the last 168 hours.    Invalid input(s): BILIRUBININDIRECT  No results for input(s): INR in the last 168 hours.  No results for input(s): LACT in the last 168 hours.  No results for input(s): LIPASE in the last 168 hours.  No results for input(s): CHOL, HDL, LDL, TRIG, CHOLHDLRATIO in the last 168 hours.  No results for input(s): TSH in the last 168 hours.  Recent Labs   Lab 20  0002 20  1727 20  1203   TROPI 0.080* 0.077* 0.084*     No results for input(s): COLOR, APPEARANCE, URINEGLC, URINEBILI, URINEKETONE, SG, UBLD, URINEPH, PROTEIN, UROBILINOGEN, NITRITE, LEUKEST, RBCU, WBCU in the last 168 hours.    Recent Results (from the past 24 hour(s))   Echocardiogram Complete    Narrative    [id number]  [id number]  [id number]  [id number]^FANNIE^LUIS A^S           [facility]  Echocardiography Laboratory  [address] [place] [address]        Name: GREGORIO BRUSH  MRN: [id number]  : 1950  Study Date: 2020 08:13 AM  Age: 70 yrs  Gender: Male  Patient Location: [facility]  Reason For Study: Chest Pain  Ordering Physician: LUIS A GRECO  Performed By: Elsy Hall     BSA: 2.1 m2  Height: 71 in  Weight: 191 lb  HR: 89  BP: 147/76 mmHg  _____________________________________________________________________________  __        Procedure  Complete Portable Echo Adult.  _____________________________________________________________________________  __        Interpretation Summary     Left ventricular systolic function is low normal.  The visual ejection fraction is estimated at 50-55%.  Moderate to severe valvular aortic stenosis.  The mean AoV pressure gradient is 35mmHg.  Compared to 10/19, degree of aortic stenosis is worse and EF has  decreased to  low normal range.  The study was technically difficult.  _____________________________________________________________________________  __        Left Ventricle  The left ventricle is normal in size. There is moderate concentric left  ventricular hypertrophy. Left ventricular systolic function is low normal. The  visual ejection fraction is estimated at 50-55%. Left ventricular diastolic  function is indeterminate. There is mild global hypokinesia of the left  ventricle.     Right Ventricle  The right ventricle is normal size. The right ventricular systolic function is  borderline reduced. There is a pacemaker lead in the right ventricle.     Atria  The left atrium is mildly dilated. The right atrium is mildly dilated.     Mitral Valve  There is mild mitral annular calcification. The mitral valve leaflets are  mildly thickened. There is mild to moderate (1-2+) mitral regurgitation. The  mean mitral valve gradient is 8mmHg. (95bpm). There is mild mitral stenosis.        Tricuspid Valve  There is mild (1+) tricuspid regurgitation. The right ventricular systolic  pressure is approximated at 53.3 mmHg plus the right atrial pressure. Right  ventricular systolic pressure is elevated, consistent with severe pulmonary  hypertension. IVC diameter and respiratory changes fall into an intermediate  range suggesting an RA pressure of 8 mmHg.     Aortic Valve  The aortic valve is not well visualized. The aortic valve is trileaflet.  Moderate to severe valvular aortic stenosis. The mean AoV pressure gradient is  35mmHg.     Pulmonic Valve  There is mild (1+) pulmonic valvular regurgitation.     Vessels  The aortic root is normal size. The ascending aorta is Borderline dilated.     Pericardium  There is no pericardial effusion.        Rhythm  The rhythm was undetermined.  _____________________________________________________________________________  __  MMode/2D Measurements & Calculations     IVSd: 1.8 cm  LVIDd:  4.8 cm  LVIDs: 3.9 cm  LVPWd: 1.5 cm  FS: 19.9 %  LV mass(C)d: 342.6 grams  LV mass(C)dI: 165.7 grams/m2  Ao root diam: 3.9 cm  LA dimension: 4.4 cm  asc Aorta Diam: 3.9 cm  LA/Ao: 1.1  LVOT diam: 2.3 cm  LVOT area: 4.2 cm2  LA Volume (BP): 82.8 ml  LA Volume Index (BP): 40.0 ml/m2  RWT: 0.60           Doppler Measurements & Calculations  MV E max randy: 184.0 cm/sec  MV max P.1 mmHg  MV mean P.6 mmHg  MV V2 VTI: 33.6 cm  MVA(VTI): 2.7 cm2  MV P1/2t max randy: 238.9 cm/sec  MV P1/2t: 58.2 msec  MVA(P1/2t): 3.8 cm2  MV dec slope: 1202 cm/sec2  MV dec time: 0.10 sec  Ao V2 max: 388.9 cm/sec  Ao max P.0 mmHg  Ao V2 mean: 273.6 cm/sec  Ao mean P.9 mmHg  Ao V2 VTI: 78.9 cm  TERI(I,D): 1.2 cm2  TERI(V,D): 1.2 cm2  LV V1 max P.3 mmHg  LV V1 max: 114.7 cm/sec  LV V1 VTI: 21.8 cm  CO(LVOT): 8.6 l/min  CI(LVOT): 4.1 l/min/m2  SV(LVOT): 91.1 ml  SI(LVOT): 44.0 ml/m2  PA acc time: 0.07 sec  TR max randy: 365.1 cm/sec  TR max P.3 mmHg  AV Randy Ratio (DI): 0.29  TERI Index (cm2/m2): 0.56  E/E' av.4  Lateral E/e': 11.1  Medial E/e': 23.8              _____________________________________________________________________________  __        Report approved by: Digna Ga 2020 11:49 AM

## 2020-03-04 NOTE — PROGRESS NOTES
"Potassium   Date Value Ref Range Status   03/04/2020 4.6 3.4 - 5.3 mmol/L Final     Hemoglobin   Date Value Ref Range Status   03/04/2020 7.2 (L) 13.3 - 17.7 g/dL Final     Creatinine   Date Value Ref Range Status   03/04/2020 8.69 (H) 0.66 - 1.25 mg/dL Final     Urea Nitrogen   Date Value Ref Range Status   03/04/2020 74 (H) 7 - 30 mg/dL Final     Sodium   Date Value Ref Range Status   03/04/2020 132 (L) 133 - 144 mmol/L Final     INR   Date Value Ref Range Status   05/13/2019 1.10 0.86 - 1.14 Final       DIALYSIS PROCEDURE NOTE  Hepatitis status of previous patient on machine log was checked and verified ok to use with this patients hepatitis status.  Patient dialyzed for 4hrs. on a 2 K bath with a net fluid removal of  2.5L.  A BFR of 450 ml/min was obtained via a FLA using 15gauge needles.    The treatment plan was dicussed with Dr. Fuller during the treatment.  Total heparin received during the treatment: 2600 units.   Needle cannulation sites held x 10min, pressure dressings applied after hemostasis.    Meds  given: Epogen Complications: patient's run extended by 30\" to receive 1 unit PRBC    Procedure reviewed with patient.  There were no barriers to his learning and he verbalized a good understanding.  No follow up needed  ICEBOAT? Timeout performed pre-treatment  I: Patient was identified using 2 identifiers  C: Consent obtained/verified current before treatment  E: Equipment preventative maintenance is current and dialysis delivery system OK to use  B: Hepatitis B Surface Antigen: negative; Draw Date: 10/29/19      Hepatitis B Surface Antibody: Immune; Draw Date: 11/21/19  O: Dialysis orders present and complete prior to treatment  A: Vascular access verified and assessed prior to treatment  T: Treatment was performed at a clinically appropriate time  ?: Patient was allowed to ask questions and address concerns prior to treatment  See flowsheet in EPIC for further details and post assessment.  Machine " water alarm in place and functioning. Transducer pods intact and checked every 15min.  Pt returned via wheelchair  Report received from: Jorge SILVERMAN  Report given to: DANIEL Crawford RN  Chlorine/Chloramine water system checked every 4 hours.  Outpatient Dialysis at [facility]

## 2020-03-04 NOTE — PLAN OF CARE
/67 (BP Location: Right arm)   Pulse 100   Temp 97.5  F (36.4  C) (Oral)   Resp 20   Wt 86.2 kg (190 lb)   SpO2 93%   BMI 26.50 kg/m        A&Ox4. A1 GBW. Tele is SR w/ 1st degree AVB. Regular diet. , 192. Will have dialysis tomorrow, fistula WDL. PIV SL. Cardiology and Nephrology following. Pt educated on plan of care.

## 2020-03-04 NOTE — PLAN OF CARE
AOx4, VSS tachy, carmen reg diet, up Ax1 to bathroom, diabetic 0200 bs was: 146. Q4 vitals. Denies pain other than dry, itchy feet. Tele sinus with 1st bundle block. Dialysis MWF; will possible get blood transfusion today with dialysis to bring up hemoglobin. plan is to hopefully discharge today following.

## 2020-03-04 NOTE — PROGRESS NOTES
Renal Medicine Inpatient Dialysis Note                                Donald Raza MRN# [id number]   Age: 70 year old YOB: 1950   Date of Admission: 3/2/2020 Hospital LOS: 2          Assessment/Plan:     Admitted with CP.  Multiple similar presentations.     Seen for ESRD management      1.  ESRD   -MWF [facility]   -AVF   -target weight 86 kg  2.  Anemia   -MIHAI   -transfuse as indicated  3.  Mineral Bone Disease  4.  CP   -evaluated by cardiology  5.  HTN   -med adjustment noted  6.  HIV +      Continue current MWF dialysis schedule  UF as tolerated        Interval History:     Dialysis run parameters reviewed with dialysis RN at patient bedside    3.25 hour  2K bath  3 liter UF  Heparin    AVF without issue    No CP or SOB at this time      ROS     GENERAL: NAD, No fever,chills  R: NEGATIVE for significant cough or SOB  CV: NEGATIVE for chest pain, palpitations  EXT: no change edema  ROS otherwise negative    Dialysis Parameters:     Vitals were reviewed  Patient Vitals for the past 8 hrs:   BP Temp Temp src Pulse Heart Rate Resp SpO2 Weight   03/04/20 1100 (!) 151/82 -- -- 79 79 18 -- --   03/04/20 1045 (!) 149/82 -- -- 76 76 18 -- --   03/04/20 1000 123/68 -- -- 79 79 18 -- --   03/04/20 0915 138/80 -- -- 72 72 18 -- --   03/04/20 0900 (!) 144/81 98  F (36.7  C) Oral 82 82 18 100 % --   03/04/20 0800 -- -- -- -- -- -- -- 86.2 kg (190 lb 0.6 oz)   03/04/20 0747 120/63 98.2  F (36.8  C) Oral -- 77 20 96 % --   03/04/20 0426 127/71 98  F (36.7  C) Oral -- 82 18 92 % --     I/O last 3 completed shifts:  In: 440 [P.O.:440]  Out: -     Vitals:    03/02/20 1027 03/02/20 1405 03/03/20 0700 03/04/20 0800   Weight: 87 kg (191 lb 12.8 oz) 88.2 kg (194 lb 8 oz) 86.2 kg (190 lb) 86.2 kg (190 lb 0.6 oz)       Current Weight: 86.2  Dry Weight: 86 ? Off below today  Dialysis Temp: 36.5  C  Access Device: AVF  Access Site: left  Dialyzer: Revaclear  Dialysis Bath: 2  Sodium Profile: n  UF Goal: 3  Blood  Flow Rate (mL/min): 450  Total Treatment Time (hrs): 3.25  Heparin: Low dose as required      EPO dose: y  Zemplar: n  IV Fe: n      Medications and Allergies:     Reviewed      Physical Exam:     Seen and examined during course of dialysis run    BP (!) 151/82   Pulse 79   Temp 98  F (36.7  C) (Oral)   Resp 18   Wt 86.2 kg (190 lb 0.6 oz)   SpO2 100%   BMI 26.50 kg/m      GENERAL: awake, alert, follows  HEENT: NC/AT, PERRLA, EOMI, non icteric, pharynx moist without lesion  RESP: clear  CV: RRR, normal S1 S2  ABDOMEN: S/NT, BS present  MS: no edema  SKIN: catheter site clean without drainage  NEURO: speech normal and cranial nerves 2-12 intact  PSYCH: affect normal/bright    Data:       Recent Labs   Lab 03/04/20  0746   *   POTASSIUM 4.6   CHLORIDE 97   CO2 24   ANIONGAP 11   *   BUN 74*   CR 8.69*   GFRESTIMATED 5*   GFRESTBLACK 6*   PRABHA 10.1     No lab results found in last 7 days.  Recent Labs   Lab 03/04/20  0746 03/03/20  0656 03/02/20  1203 03/02/20  0702   HGB 7.2* 7.6* 7.9* 8.3*         G David Fuller MD    [facility] - Nephrology  [phone]

## 2020-03-04 NOTE — PROGRESS NOTES
[facility]  Cardiology Progress Note      Date of Service (when I saw the patient): 03/04/2020    Summary: Donald Raza is a 70 year old male with history of end-stage renal disease on hemodialysis, type 2 diabetes mellitus, hypertension, hyperlipidemia, chronic anemia, HIV, aortic stenosis, coronary artery disease status post angioplasty, who was admitted on 3/2/2020 for evaluation of chest pain.      Interval History   Tele:  SR      Chest pain resolved. But has come and gone intermittently.      Assessment & Plan   1.  Chronic stable CAD, adequately revascularized.  - Continue ASA, BB, Imdur, statin    2.  Chest pain, likely multifactorial from subendocardial ischemia, diastolic dysfunction.   - Mildly elevated troponin levels not consistent with ACS.  - Resolved  - Dr. Bernardo will discuss blood or iron with nephrology given hgb 7.2    3.  End-stage renal disease, on hemodialysis.    4.  Hypertension.  - BPs better controlled today  - Currently on Coreg 12.5 mg BID, irbesartan 300 mg, Imdur 120 mg    5.  Normally functioning pacemaker.    6.  Moderate to severe aortic stenosis, no change compared with previously.    7.  HIV+.   - On anti viral therapy.      Elvin Salmon PA-C      Patient Active Problem List   Diagnosis     Type 2 diabetes mellitus (H)     Human immunodeficiency virus (HIV) disease (HCC)     Allergic rhinitis     Impotence of organic origin     Huang disease     Renal insufficiency     Hypertension     Mixed hyperlipidemia     Organ transplant candidate     Acute chest pain     Coronary artery disease     Unstable angina (H)     ESRD (end stage renal disease) on dialysis (H)     Pulmonary hypertension (H)     Dilated aortic root (H)     Bradycardia     Anemia     Bilateral pneumonia     Diarrhea     AIDS (acquired immune deficiency syndrome) (H)     Anemia due to chronic kidney disease     Dialysis AV fistula malfunction (H)     Unstable angina pectoris (H)     Bronchitis      Pneumonia     Acute respiratory failure with hypoxia (H)     Acute pulmonary edema (H)     Respiratory failure (H)     Generalized weakness     TIA (transient ischemic attack)     Cerebral hypoperfusion, transient and thought primarily to low volume/BP assoc with dialysis     Expressive aphasia     Generalized muscle weakness     Sepsis (H)     COPD (chronic obstructive pulmonary disease) (H)     COPD exacerbation (H)     Dialysis patient (H)     Angina at rest (H)     Chest pain     Cardiac pacemaker in situ     Hypertensive emergency     Pulmonary edema     ACS (acute coronary syndrome) (H)       Physical Exam   Temp: 98  F (36.7  C) Temp src: Oral BP: 123/68 Pulse: 79 Heart Rate: 79 Resp: 18 SpO2: 100 % O2 Device: None (Room air)    Vitals:    03/02/20 1405 03/03/20 0700 03/04/20 0800   Weight: 88.2 kg (194 lb 8 oz) 86.2 kg (190 lb) 86.2 kg (190 lb 0.6 oz)     Vital Signs with Ranges  Temp:  [97.5  F (36.4  C)-98.5  F (36.9  C)] 98  F (36.7  C)  Pulse:  [72-82] 79  Heart Rate:  [72-85] 79  Resp:  [18-20] 18  BP: (120-144)/(61-81) 123/68  SpO2:  [91 %-100 %] 100 %  I/O last 3 completed shifts:  In: 440 [P.O.:440]  Out: -     Constitutional: NAD. At HD.   Respiratory: CTA anterior  Cardiovascular: RRR, s1 soft s2, III/VI systolic murmur  GI: soft, BS+  Skin: warm, no rashes  Musculoskeletal: Moving all extremities  Neurologic: Alert, oriented x 3  Neuropsychiatric: Normal affect       Data   Recent Labs   Lab 03/04/20  0746 03/03/20  0656 03/03/20  0002 03/02/20  1727 03/02/20  1203  03/02/20  0702   WBC 5.5 5.0  --   --  5.8  --  5.8   HGB 7.2* 7.6*  --   --  7.9*  --  8.3*   MCV 89 91  --   --  91  --  93   * 139*  --   --  143*  --  154   * 136  --   --   --   --  137   POTASSIUM 4.6 3.8  --   --   --   --  4.2   CHLORIDE 97 100  --   --   --   --  102   CO2 24 26  --   --   --   --  23   BUN 74* 48*  --   --   --   --  84*   CR 8.69* 6.78*  --   --   --   --  9.90*   ANIONGAP 11 10  --   --   --    --  12   PRABHA 10.1 10.0  --   --   --   --  10.4*   * 134*  --   --   --   --  232*   TROPI  --   --  0.080* 0.077* 0.084*   < > 0.058*    < > = values in this interval not displayed.     No results found for this or any previous visit (from the past 24 hour(s)).    Medications     heparin (porcine)       - MEDICATION INSTRUCTIONS -         - MEDICATION INSTRUCTIONS for Dialysis Patients -   Does not apply See Admin Instructions     sodium chloride 0.9%  250 mL Intravenous Once in dialysis     sodium chloride 0.9%  300 mL Hemodialysis Machine Once     abacavir  600 mg Oral QPM     aspirin  81 mg Oral QPM     atorvastatin  40 mg Oral At Bedtime     carvedilol  12.5 mg Oral BID w/meals     cinacalcet  30 mg Oral Once per day on Mon Wed Fri     dapsone  100 mg Oral At Bedtime     darunavir  800 mg Oral At Bedtime     dolutegravir  50 mg Oral At Bedtime     epoetin brittni (EPOGEN,PROCRIT) inj ESRD  10,000 Units Intravenous Once in dialysis     gabapentin  300 mg Oral BID     heparin (porcine)  500 Units Hemodialysis Machine Once in dialysis     insulin aspart  1-7 Units Subcutaneous TID AC     insulin aspart  1-5 Units Subcutaneous At Bedtime     irbesartan  300 mg Oral At Bedtime     isosorbide mononitrate  120 mg Oral QPM     mirtazapine  15 mg Oral At Bedtime     pantoprazole  40 mg Oral Daily     ritonavir  100 mg Oral At Bedtime     sodium chloride (PF)  3 mL Intracatheter Q8H     sucroferric oxyhydroxide  1,000 mg Oral TID w/meals     umeclidinium  1 puff Inhalation Daily

## 2020-03-05 NOTE — PROGRESS NOTES
Discharge Planner   Discharge Plans in progress: Discharge orders in place, pt was open to [facility] RN prior to admission. Paged provider to update orders. AVS updated.   Barriers to discharge plan: None.   Follow up plan: Not needed.        Entered by: Radha Mayfield 03/05/2020 2:09 PM       CM will continue to follow patient for any additional discharge needs.     Radha Mayfield RN BSN   Inpatient Care Coordination  [facility]   Phone [phone]

## 2020-03-05 NOTE — PROGRESS NOTES
Pt alert and oriented.   /65   Pulse 74   Temp 98.5  F (36.9  C) (Oral)   Resp 18   Wt 86.2 kg (190 lb 0.6 oz)   SpO2 92%   BMI 26.50 kg/m    Denies cp.   SBA.   Had shower today.   Hemodialysis M-W-F  Plan to discharge today

## 2020-03-05 NOTE — PROGRESS NOTES
[facility] Cardiology Progress Note       Assessment and Plan:   1.  Chronic stable CAD, adequately revascularized  2.  Chest pain, likely multifactorial from subendocardial ischemia, diastolic dysfunction.  Mildly elevated troponin levels not consistent with ACS.  3.  End-stage renal disease, on hemodialysis.  4.  Hypertension: Better control.    5.  Normally functioning pacemaker.  6.  Moderate to severe aortic stenosis, no change compared with previously.  7.  HIV+:  On anti viral therapy.    Ready to be discharged, has regular follow-up in our clinic.            Interval History:   Continues to be free of chest pain.  Feels well going home today                  Medications:     Current Facility-Administered Medications   Medication     - MEDICATION INSTRUCTIONS for Dialysis Patients -     abacavir (ZIAGEN) tablet 600 mg     acetaminophen (TYLENOL) tablet 650 mg     albuterol (PROAIR HFA/PROVENTIL HFA/VENTOLIN HFA) 108 (90 Base) MCG/ACT inhaler 2 puff     aspirin EC tablet 81 mg     atorvastatin (LIPITOR) tablet 40 mg     carvedilol (COREG) tablet 12.5 mg     cinacalcet (SENSIPAR) tablet 30 mg     dapsone (ACZONE) tablet 100 mg     darunavir (PREZISTA) tablet 800 mg     glucose gel 15-30 g    Or     dextrose 50 % injection 25-50 mL    Or     glucagon injection 1 mg     dolutegravir (TIVICAY) tablet 50 mg     gabapentin (NEURONTIN) capsule 300 mg     insulin aspart (NovoLOG) injection (RAPID ACTING)     insulin aspart (NovoLOG) injection (RAPID ACTING)     irbesartan (AVAPRO) tablet 300 mg     isosorbide mononitrate (IMDUR) 24 hr tablet 120 mg     lidocaine (LMX4) cream     lidocaine 1 % 0.1-1 mL     melatonin tablet 1 mg     mirtazapine (REMERON) tablet 15 mg     naloxone (NARCAN) injection 0.1-0.4 mg     nitroGLYcerin (NITROSTAT) sublingual tablet 0.4 mg     ondansetron (ZOFRAN-ODT) ODT tab 4 mg    Or     ondansetron (ZOFRAN) injection 4 mg     pantoprazole (PROTONIX) EC tablet 40 mg     Patient is  already receiving anticoagulation with heparin, enoxaparin (LOVENOX), warfarin (COUMADIN)  or other anticoagulant medication     polyethylene glycol (MIRALAX) Packet 17 g     ritonavir (NORVIR) tablet 100 mg     senna-docusate (SENOKOT-S/PERICOLACE) 8.6-50 MG per tablet 1 tablet    Or     senna-docusate (SENOKOT-S/PERICOLACE) 8.6-50 MG per tablet 2 tablet     sodium chloride (PF) 0.9% PF flush 3 mL     sodium chloride (PF) 0.9% PF flush 3 mL     sucroferric oxyhydroxide (VELPHORO) chewable tablet 1,000 mg     umeclidinium (INCRUSE ELLIPTA) 62.5 MCG/INH inhaler 1 puff             Physical Exam:   Blood pressure 137/65, pulse 74, temperature 98.5  F (36.9  C), temperature source Oral, resp. rate 18, weight 86.2 kg (190 lb 0.6 oz), SpO2 92 %.  Wt Readings from Last 4 Encounters:   20 86.2 kg (190 lb 0.6 oz)   19 82.1 kg (181 lb)   19 86.5 kg (190 lb 11.2 oz)   10/26/19 89.3 kg (196 lb 14.4 oz)         Vital Sign Ranges  Temperature Temp  Av.4  F (36.9  C)  Min: 97.2  F (36.2  C)  Max: 99.4  F (37.4  C)   Blood pressure Systolic (24hrs), Av , Min:121 , Max:156        Diastolic (24hrs), Av, Min:52, Max:85      Pulse Pulse  Av  Min: 100  Max: 100   Respirations Resp  Av.3  Min: 16  Max: 26   Pulse oximetry SpO2  Av.1 %  Min: 90 %  Max: 97 %         Intake/Output Summary (Last 24 hours) at 3/3/2020 1513  Last data filed at 3/3/2020 1400  Gross per 24 hour   Intake 200 ml   Output 2000 ml   Net -1800 ml          Cardiovascular:   Normal apical impulse, regular rate and rhythm, normal S1 and soft S2, 3/6 systolic murmur noted   Chest clear.  No peripheral oedema         Data:     Labs:  Lab Results   Component Value Date     2020    Lab Results   Component Value Date    CHLORIDE 100 2020    Lab Results   Component Value Date    BUN 48 2020      Lab Results   Component Value Date    POTASSIUM 3.8 2020    Lab Results   Component Value Date    CO2 26  03/03/2020    Lab Results   Component Value Date    CR 6.78 03/03/2020        Lab Results   Component Value Date    WBC 5.2 03/05/2020    HGB 8.5 (L) 03/05/2020    HCT 28.7 (L) 03/05/2020    MCV 92 03/05/2020     (L) 03/05/2020     Lab Results   Component Value Date    TROPONIN 0.02 03/08/2018    TROPI 0.080 (H) 03/03/2020           Attestation:  I have reviewed today's vital signs, notes, medications, labs and imaging.         DR SUSAN FRANKS MD 3/3/2020  3:13 PM

## 2020-03-05 NOTE — DISCHARGE SUMMARY
[facility]  Discharge Summary  Hospitalist      Date of Admission:  3/2/2020  Date of Discharge:  3/5/2020  Provider:  Sherice Bernardo MD  Date of Service (when I last saw the patient): 03/05/20      Primary Provider: Sukh Owen          Discharge Diagnosis:   Discharge Diagnoses   Chest pain associated with elevated troponin secondary to anemia  Symptomatic anemia with chest pain  End-stage renal disease on hemodialysis    Other medical issues:  Past Medical History:   Diagnosis Date     Allergic rhinitis      Anemia due to chronic kidney disease 10/21/2011     CAD S/P percutaneous coronary angioplasty 06/15/2015     Cataract      CKD (chronic kidney disease)      Colon cancer      COPD      Depression      Dilated aortic root 05/06/2016     Diverticulitis      ESRD (end stage renal disease) on dialysis 05/06/2016     Gout      Human immunodeficiency virus (HIV) disease      Hx of squamous cell carcinoma 03/23/2007     Hypertension 2010     Impotence of organic origin      Increased prostate specific antigen (PSA) velocity 08/08/2016    Awaiting bx on blood thinner     Mixed hyperlipidemia      Pulmonary HTN     Mod     TIA (transient ischemic attack) 5/2016     Type 2 diabetes mellitus age 50          History of Present Illness   Donald Raza is an 70 year old male who presented with chest pain.  Please see the admission history and physical for full details.    Hospital Course     Donald Raza was admitted on 3/2/2020.     He is a 70 year old male who presented for evaluation of chest pain.  Patient's past medical history significant for end-stage renal disease on hemodialysis, type 2 diabetes mellitus, hypertension, hyperlipidemia, chronic anemia, HIV, aortic stenosis, coronary artery disease status post angioplasty presented with chest pain.     Patient has had chest pain chronically intermittently.  Discussed with cardiologist recommends blood transfusion or iron supplementation to  "help treatment of anemia.  Received 1 unit of PRBCs with significant improvement no more chest pain.  Per recommendation from cardiology will keep hemoglobin above 8.      The following problems were addressed during his hospitalization:    Chest pain with associated troponin elevation  -Peak troponin was 0.08  -EKG left bundle branch block  -Prior history of extensive coronary artery disease status post LAD and diagonal stenting in 2015 status post LAD and diagonal stenting due to in-stent stenosis in 2016 status post VANITA stenting on 12/9/2019 and a patent LAD and diagonal stents noted at that time.  -Cardiology consulted did not recommend repeat angiogram or nuclear test for now  -Chronically elevated troponin likely due to end-stage renal disease and fluid overload in setting of severe anemia.  Transfuse 1 unit of PRBCs  -Chest pain resolved with sublingual nitroglycerin  -Cardiology consulted recommended transfusion to keep hemoglobin above 8  -Cardiac echo with more prominent left ear  -Continue sublingual nitroglycerin, IV heparin discontinued after recommendation from cardiologist   -Increased dose of Coreg per cardiology.  Continue ARB, and isosorbide mononitrate  -Clinically improved after transfusion of 1 unit of PRBCs with dialysis     CHF  -On admission with known peripheral edema and few basilar crackles.  Chest x-ray with mild bibasilar congestion  -Patient followed by nephrology hemodialysis   -echocardioigram \"Left ventricular systolic function is low normal.  The visual ejection fraction is estimated at 50-55%.  Moderate to severe valvular aortic stenosis.  The mean AoV pressure gradient is 35mmHg.  Compared to 10/19, degree of aortic stenosis is worse and EF has decreased to  low normal range.\"  -Allergy was consulted and recommended follow-up as outpatient     End-stage renal disease on hemodialysis  -Patient will benefit with increased volume removal for symptomatic relief  -Dialysis Monday " Wednesday Friday     Diabetes mellitus type 2   -Last hemoglobin A1c was 5.3  -Patient on sliding scale     HIV  -Continue prior to admission antiviral therapy     COPD  -No history of exacerbation  -Continue prior to admission long-acting inhalers and albuterol     History of bradycardia   -status post pacemaker placement on 05/20/2019     Anemia of chronic disease  -Hemoglobin on admission 8.3  -Trending down to 7.2 this morning  -Improved with transfusion of 1 unit of PRBC  -Goal is to keep hemoglobin above 8       Significant Results and Procedures   As noted above    Pending Results   Unresulted Labs Ordered in the Past 30 Days of this Admission     No orders found from 2/1/2020 to 3/3/2020.          Code Status   Full Code       Primary Care Physician   Sukh Owen    Physical Exam   Temp: 98.5  F (36.9  C) Temp src: Oral BP: 137/65 Pulse: 74 Heart Rate: 81 Resp: 18 SpO2: 92 % O2 Device: None (Room air)    Vitals:    03/02/20 1405 03/03/20 0700 03/04/20 0800   Weight: 88.2 kg (194 lb 8 oz) 86.2 kg (190 lb) 86.2 kg (190 lb 0.6 oz)     Vital Signs with Ranges  Temp:  [98.1  F (36.7  C)-98.8  F (37.1  C)] 98.5  F (36.9  C)  Pulse:  [74-81] 74  Heart Rate:  [78-84] 81  Resp:  [18] 18  BP: (127-172)/(65-79) 137/65  SpO2:  [91 %-98 %] 92 %  I/O last 3 completed shifts:  In: 521 [P.O.:240]  Out: 3000 [Other:3000]    Constitutional: Awake, alert, cooperative, no apparent distress, and appears stated age.  Respiratory: No increased work of breathing, good air exchange, clear to auscultation bilaterally, no crackles or wheezing.  Cardiovascular: Normal apical impulse,normal S1 and S2,   GI: No scars, normal bowel sounds, soft, non-distended, non-tender, no masses palpated, no hepatosplenomegaly.      Discharge Disposition   Discharged to home    Consultations This Hospital Stay   PHARMACY TO DOSE HEPARIN  CARDIOLOGY IP CONSULT  NEPHROLOGY IP CONSULT  PHARMACY TO DOSE HEPARIN  CARE COORDINATOR IP CONSULT    Time  Spent on this Encounter   I, Sherice Bernardo MD, personally saw the patient today and spent greater than 30 minutes discharging this patient.    Discharge Orders      Reason for your hospital stay    Please refer to discharge summary.  Briefly admitted for symptomatic anemia with chest pain and shortness of breath improved with blood transfusion.  Goal is to keep hemoglobin above 8.     Follow-up and recommended labs and tests     Follow up with primary care provider, Sukh Owen, within 7 days for hospital follow- up.  The following labs/tests are recommended: Hemoglobin  Follow-up with dialysis as scheduled  Follow-up with cardiology in 2 to 4 weeks  Please call or come if there are any new or worsening symptoms.     Activity    Your activity upon discharge: activity as tolerated     Full Code     Diet    Follow this diet upon discharge: Orders Placed This Encounter      Snacks/Supplements Adult: Nepro Oral Supplement; With Meals      Combination Diet moderate CHO diet and dialysis diet     Discharge Medications   Current Discharge Medication List      CONTINUE these medications which have NOT CHANGED    Details   abacavir (ZIAGEN) 300 MG tablet Take 600 mg by mouth every evening       albuterol (PROAIR HFA/PROVENTIL HFA/VENTOLIN HFA) 108 (90 BASE) MCG/ACT Inhaler Inhale 2 puffs into the lungs every 6 hours as needed for shortness of breath / dyspnea or wheezing  Qty: 1 Inhaler, Refills: 1    Associated Diagnoses: Cough      aspirin 81 MG EC tablet Take 81 mg by mouth every evening      atorvastatin (LIPITOR) 40 MG tablet Take 40 mg by mouth At Bedtime      carvedilol (COREG) 12.5 MG tablet Take 1 tablet (12.5 mg) by mouth 2 times daily (with meals)  Qty: 60 tablet, Refills: 1    Associated Diagnoses: Hypertensive emergency      chlorhexidine (CHLORHEXIDINE) 0.12 % solution Rinse and gargle 15 ml by mouth twice a day as directed.      CLONAZEPAM PO Take 0.5 mg by mouth nightly as needed for anxiety  (restless legs)      cloNIDine (CATAPRES) 0.1 MG tablet Take 0.1 mg by mouth daily as needed (for high blood pressure) If sbp > 180      dapsone 100 MG tablet Take 100 mg by mouth At Bedtime       Darunavir Ethanolate (PREZISTA PO) Take 800 mg by mouth At Bedtime.      dolutegravir (TIVICAY) 50 MG tablet Take 50 mg by mouth At Bedtime      !! gabapentin (NEURONTIN) 300 MG capsule Take 300 mg by mouth as needed May take after HD.      !! gabapentin (NEURONTIN) 300 MG capsule Take 300 mg by mouth 2 times daily       guaiFENesin (MUCINEX) 600 MG 12 hr tablet Take 1,200 mg by mouth 2 times daily as needed       imiquimod (ALDARA) 5 % cream Apply topically as needed      insulin lispro (HUMALOG PENFILL) 100 UNIT/ML Cartridge Inject 2 Units Subcutaneous 3 times daily (before meals)  Qty: 3 mL, Refills: 3    Associated Diagnoses: Type 2 diabetes mellitus with hyperosmolarity without coma, with long-term current use of insulin (H)      insulin lispro (HUMALOG VIAL) 100 UNIT/ML vial Inject Subcutaneous 3 times daily (before meals) Takes 2 units for every 50 BG above 150.  Qty: 3 mL, Refills: 3    Associated Diagnoses: Type 2 diabetes mellitus with hyperosmolarity without coma, with long-term current use of insulin (H)      irbesartan (AVAPRO) 300 MG tablet Take 1 tablet (300 mg) by mouth At Bedtime  Qty: 30 tablet, Refills: 0    Associated Diagnoses: Hypertension secondary to other renal disorders      Isosorbide Mononitrate  MG TB24 Take 1 tablet (120 mg) by mouth every evening  Qty: 30 tablet, Refills: 11    Associated Diagnoses: Coronary artery disease involving native heart, angina presence unspecified, unspecified vessel or lesion type; Hypertension secondary to other renal disorders      ketoconazole (NIZORAL) 2 % cream Apply topically daily Between toes for fungal infection      MAGNESIUM OXIDE PO Take 400 mg by mouth At Bedtime      melatonin 5 MG tablet Take 5 mg by mouth nightly as needed       mirtazapine  "(REMERON) 15 MG tablet Take 15 mg by mouth At Bedtime      Nutritional Supplements (NEPRO PO) Take 1 Container by mouth 3 times daily 2-3 times daily      pantoprazole (PROTONIX) 40 MG EC tablet Take 40 mg by mouth daily      polyethylene glycol (MIRALAX/GLYCOLAX) packet Take 1 packet by mouth daily as needed for constipation      ritonavir (NORVIR) 100 MG capsule Take 1 capsule by mouth At Bedtime       sucroferric oxyhydroxide (VELPHORO) 500 MG CHEW chewable tablet Take 1,000 mg by mouth 3 times daily (with meals)       umeclidinium (INCRUSE ELLIPTA) 62.5 MCG/INH inhaler Inhale 1 puff into the lungs daily      cinacalcet (SENSIPAR) 30 MG tablet Take 30 mg by mouth three times a week On dialysis days      DOXERCALCIFEROL IV (given at dialysis)_      epoetin brittni (EPOGEN,PROCRIT) 45048 UNIT/ML injection WITH DIALYSIS;      ipratropium - albuterol 0.5 mg/2.5 mg/3 mL (DUONEB) 0.5-2.5 (3) MG/3ML neb solution Take 1 vial (3 mLs) by nebulization every 6 hours as needed for shortness of breath / dyspnea or wheezing  Qty: 90 vial, Refills: 1    Associated Diagnoses: Acute respiratory failure with hypoxia (H)      nitroglycerin (NITROSTAT) 0.4 MG SL tablet One tablet under the tongue every 5 minutes if needed for chest pain. May repeat every 5 minutes for a maximum of 3 doses in 15 minutes\"  Qty: 25 tablet, Refills: 3    Associated Diagnoses: Coronary artery disease due to lipid rich plaque; Status post coronary angioplasty       !! - Potential duplicate medications found. Please discuss with provider.      STOP taking these medications       insulin glargine (LANTUS PEN) 100 UNIT/ML pen Comments:   Reason for Stopping:             Allergies   Allergies   Allergen Reactions     Calcium Acetate Other (See Comments)     Other reaction(s): Other, see comments  Pain in chest and back  Pain in chest area (sensitive skin)      Contrast Dye Other (See Comments)     Contrast nephropathy     Diagnostic X-Ray Materials Other (See " Comments)     PN: renal failure     Lisinopril      Sulfa Drugs      Data   Most Recent 3 CBC's:  Recent Labs   Lab Test 03/05/20  0640 03/04/20  0746 03/03/20  0656   WBC 5.2 5.5 5.0   HGB 8.5* 7.2* 7.6*   MCV 92 89 91   * 120* 139*      Most Recent 3 BMP's:  Recent Labs   Lab Test 03/05/20  0640 03/04/20  0746 03/03/20  0656   * 132* 136   POTASSIUM 3.8 4.6 3.8   CHLORIDE 96 97 100   CO2 30 24 26   BUN 43* 74* 48*   CR 5.35* 8.69* 6.78*   ANIONGAP 6 11 10   PRABHA 9.8 10.1 10.0   * 139* 134*     Most Recent 2 LFT's:  Recent Labs   Lab Test 11/26/19  2213 10/18/19  0546   AST 15 15   ALT 16 12   ALKPHOS 104 76   BILITOTAL 0.6 0.5     Most Recent INR's and Anticoagulation Dosing History:  Anticoagulation Dose History     Recent Dosing and Labs Latest Ref Rng & Units 9/19/2017 9/20/2017 10/10/2017 11/21/2017 12/5/2018 5/2/2019 5/13/2019    INR 0.86 - 1.14 0.94 1.01 0.95 0.97 1.03 1.06 1.10        Most Recent 3 Troponin's:  Recent Labs   Lab Test 03/03/20  0002 03/02/20  1727 03/02/20  1203  03/08/18  0542  12/25/17  0856  04/15/17  0820   TROPI 0.080* 0.077* 0.084*   < > 0.043   < >  --    < >  --    TROPONIN  --   --   --   --  0.02  --  0.02  --  0.02    < > = values in this interval not displayed.     Most Recent Cholesterol Panel:  Recent Labs   Lab Test 09/02/18  0629   CHOL 127   LDL Cannot estimate LDL when triglyceride exceeds 400 mg/dL  48   HDL 26*   TRIG 447*     Most Recent 6 Bacteria Isolates From Any Culture (See EPIC Reports for Culture Details):  Recent Labs   Lab Test 12/03/19  1107 12/03/19  1055 02/11/19 2008 02/11/19 2001 01/03/19  1359 01/03/19  1242   CULT No growth No growth No growth No growth No growth No growth     Most Recent TSH, T4 and A1c Labs:  Recent Labs   Lab Test 03/02/20  0702  05/13/19  0600   TSH  --   --  4.48*   T4  --   --  0.70*   A1C 5.1   < >  --     < > = values in this interval not displayed.     Results for orders placed or performed during the  hospital encounter of 20   XR Chest 2 Views    Narrative    CHEST TWO VIEWS  3/2/2020 7:53 AM     HISTORY:  Chest pain.    COMPARISON: 12/3/2019.      Impression    IMPRESSION: Stable right chest pacemaker. Removal of ET tube. Some  mild patchy opacity at the right infrahilar region could represent  scarring from previous airspace disease versus ongoing airspace  disease. Mild bilateral vascular congestion. Stable borderline  cardiomegaly. No effusion.    JONY ESPAÑA MD   Echocardiogram Complete    Narrative    [id number]  [id number]  [id number]  [id number]^FANNIE^LUIS A^S           [facility]  Echocardiography Laboratory  [address] [place] [address]        Name: GREGORIO BRUSH  MRN: [id number]  : 1950  Study Date: 2020 08:13 AM  Age: 70 yrs  Gender: Male  Patient Location: [facility]  Reason For Study: Chest Pain  Ordering Physician: LUIS A GRECO  Performed By: Elsy Hall     BSA: 2.1 m2  Height: 71 in  Weight: 191 lb  HR: 89  BP: 147/76 mmHg  _____________________________________________________________________________  __        Procedure  Complete Portable Echo Adult.  _____________________________________________________________________________  __        Interpretation Summary     Left ventricular systolic function is low normal.  The visual ejection fraction is estimated at 50-55%.  Moderate to severe valvular aortic stenosis.  The mean AoV pressure gradient is 35mmHg.  Compared to 10/19, degree of aortic stenosis is worse and EF has decreased to  low normal range.  The study was technically difficult.  _____________________________________________________________________________  __        Left Ventricle  The left ventricle is normal in size. There is moderate concentric left  ventricular hypertrophy. Left ventricular systolic function is low normal. The  visual ejection fraction is estimated at 50-55%. Left ventricular diastolic  function is indeterminate. There  is mild global hypokinesia of the left  ventricle.     Right Ventricle  The right ventricle is normal size. The right ventricular systolic function is  borderline reduced. There is a pacemaker lead in the right ventricle.     Atria  The left atrium is mildly dilated. The right atrium is mildly dilated.     Mitral Valve  There is mild mitral annular calcification. The mitral valve leaflets are  mildly thickened. There is mild to moderate (1-2+) mitral regurgitation. The  mean mitral valve gradient is 8mmHg. (95bpm). There is mild mitral stenosis.        Tricuspid Valve  There is mild (1+) tricuspid regurgitation. The right ventricular systolic  pressure is approximated at 53.3 mmHg plus the right atrial pressure. Right  ventricular systolic pressure is elevated, consistent with severe pulmonary  hypertension. IVC diameter and respiratory changes fall into an intermediate  range suggesting an RA pressure of 8 mmHg.     Aortic Valve  The aortic valve is not well visualized. The aortic valve is trileaflet.  Moderate to severe valvular aortic stenosis. The mean AoV pressure gradient is  35mmHg.     Pulmonic Valve  There is mild (1+) pulmonic valvular regurgitation.     Vessels  The aortic root is normal size. The ascending aorta is Borderline dilated.     Pericardium  There is no pericardial effusion.        Rhythm  The rhythm was undetermined.  _____________________________________________________________________________  __  MMode/2D Measurements & Calculations     IVSd: 1.8 cm  LVIDd: 4.8 cm  LVIDs: 3.9 cm  LVPWd: 1.5 cm  FS: 19.9 %  LV mass(C)d: 342.6 grams  LV mass(C)dI: 165.7 grams/m2  Ao root diam: 3.9 cm  LA dimension: 4.4 cm  asc Aorta Diam: 3.9 cm  LA/Ao: 1.1  LVOT diam: 2.3 cm  LVOT area: 4.2 cm2  LA Volume (BP): 82.8 ml  LA Volume Index (BP): 40.0 ml/m2  RWT: 0.60           Doppler Measurements & Calculations  MV E max danny: 184.0 cm/sec  MV max P.1 mmHg  MV mean P.6 mmHg  MV V2 VTI: 33.6  cm  MVA(VTI): 2.7 cm2  MV P1/2t max randy: 238.9 cm/sec  MV P1/2t: 58.2 msec  MVA(P1/2t): 3.8 cm2  MV dec slope: 1202 cm/sec2  MV dec time: 0.10 sec  Ao V2 max: 388.9 cm/sec  Ao max P.0 mmHg  Ao V2 mean: 273.6 cm/sec  Ao mean P.9 mmHg  Ao V2 VTI: 78.9 cm  TERI(I,D): 1.2 cm2  TERI(V,D): 1.2 cm2  LV V1 max P.3 mmHg  LV V1 max: 114.7 cm/sec  LV V1 VTI: 21.8 cm  CO(LVOT): 8.6 l/min  CI(LVOT): 4.1 l/min/m2  SV(LVOT): 91.1 ml  SI(LVOT): 44.0 ml/m2  PA acc time: 0.07 sec  TR max randy: 365.1 cm/sec  TR max P.3 mmHg  AV Randy Ratio (DI): 0.29  TERI Index (cm2/m2): 0.56  E/E' av.4  Lateral E/e': 11.1  Medial E/e': 23.8              _____________________________________________________________________________  __        Report approved by: Digna Ga 2020 11:49 AM                Disclaimer: This note consists of symbols derived from keyboarding, dictation and/or voice recognition software. As a result, there may be errors in the script that have gone undetected. Please consider this when interpreting information found in this chart.

## 2020-03-05 NOTE — PLAN OF CARE
Assumed cares from 0122-2410      /65 (BP Location: Right arm)   Pulse 77   Temp 98.5  F (36.9  C) (Oral)   Resp 18   Wt 86.2 kg (190 lb 0.6 oz)   SpO2 98%   BMI 26.50 kg/m      A&O. SBA. Tele is SR w/ 1st degree AVB. . PIV SL. Pt c/o lower back pain, PRN Tylenol given x1 w/ some relief. Plan is discharge home in 1-2 days if absence of pain. Will continue POC.

## 2020-03-05 NOTE — PROGRESS NOTES
Patient's discharge instructions were reviewed with patient and/or brother.   Patient verbalized understanding of discharge instructions, recommended follow up and was given an opportunity to ask questions.   Discharge medications sent home with patient/family: No   Discharged with brother

## 2020-03-05 NOTE — PROGRESS NOTES
Renal Medicine       Dialysis orders entered   Dialysis 03/06/20  In v outpatient        Recent Labs   Lab 03/05/20  0640   *   POTASSIUM 3.8   CHLORIDE 96   CO2 30   ANIONGAP 6   *   BUN 43*   CR 5.35*   GFRESTIMATED 10*   GFRESTBLACK 11*   PRABHA 9.8           JAMAL Fuller    [facility]  [phone]

## 2020-03-05 NOTE — PLAN OF CARE
VS: WDL  Orientation: WDL  Tele: SR 1*AVB BBB prolonged QT peaked T waves  Glucose checks: 169 overnight  Activity: SBA  Diet: regular  GI: WDL  : on hemodialysis  Respiratory: WDL  IV: SL  Plan: possible discharge to home today or tomorrow

## 2020-03-05 NOTE — DISCHARGE INSTRUCTIONS
Your home care referral was sent to [facility]  If you haven't heard from them within the next 24-48 hours,  Please call them at [phone]    Appointments:     Thurs 3/12 at 1040 with Dr Ny at [facility] [place]. Your PCP, Dr Owen, is not available on your non dialysis days. If you have questions or concerns, please call the clinic at [phone].

## 2020-03-09 NOTE — TELEPHONE ENCOUNTER
Patient was evaluated by cardiology while inpatient for CAD, Pulm HTN Called patient to discuss any post hospital d/c questions he may have, review medication changes, and confirm f/u appts. Patient denied any questions regarding new medications or changes to PTA medications. Patient denied any SOB, chest pain, or light headedness. RN confirmed with patient that he needs to make an apt for follow up hospital. Patient advised to call clinic with any cardiac related questions or concerns prior to this cheryl't. Patient verbalized understanding and agreed with plan.

## 2020-03-16 PROBLEM — R07.9 CHEST PAIN: Status: ACTIVE | Noted: 2019-05-02

## 2020-03-16 NOTE — CONSULTS
RENAL CONSULTATION NOTE    REFERRING MD:  Meek Salas    REASON FOR CONSULTATION:  ESRD    HPI:  70 y.o man with  ESRD, CAD and HIV, who is re-admitted for chest pain. He say chest pain started at 0200 last night. He has some shortness of breath with it. No fever, chill or night sweat. He gets HD om  MWF. Last HD treatment was on Friday.     Troponin is 0.160. ECG with LBBB (old).     ROS:  A complete review of systems was performed and is negative except as noted above.    PMH:    Past Medical History:   Diagnosis Date     Allergic rhinitis      Anemia due to chronic kidney disease 10/21/2011     CAD S/P percutaneous coronary angioplasty 06/15/2015     Cataract      CKD (chronic kidney disease)      Colon cancer      COPD      Depression      Dilated aortic root 05/06/2016     Diverticulitis      ESRD (end stage renal disease) on dialysis 05/06/2016     Gout      Human immunodeficiency virus (HIV) disease      Hx of squamous cell carcinoma 03/23/2007     Hypertension 2010     Impotence of organic origin      Increased prostate specific antigen (PSA) velocity 08/08/2016    Awaiting bx on blood thinner     Mixed hyperlipidemia      Pulmonary HTN     Mod     TIA (transient ischemic attack) 5/2016     Type 2 diabetes mellitus age 50       PSH:    Past Surgical History:   Procedure Laterality Date     ANGIOGRAM  03-04-16    No culprit lesions, stents widely patent      ANGIOGRAM  05-06-16    Cutting balloon ptca=Diag     APPENDECTOMY  2000     CHOLECYSTECTOMY, LAPOROSCOPIC       COLON SURGERY       COLOSTOMY  09/30/1999    Temporary for diverticulitis     EP PACEMAKER N/A 5/2/2019    Procedure: EP Pacemaker;  Surgeon: Angelique Perera MD;  Location:  HEART CARDIAC CATH LAB     HC LEFT HEART CATHETERIZATION  03/12/2018    No significant change  Elevated LVEDp  LVEF 30% No PCI     HEART CATH, ANGIOPLASTY  08-18-16    LAD PCI. Stented with a 3.0 x 8 mm Xience Alpine stent.     IR DIALYSIS FISTULOGRAM LEFT   1/25/2019     STENT, CORONARY, - VANITA=Diag, PTCA=LAD  12/2015     STENT, CORONARY, - VANITA=LAD  06/2015       MEDICATIONS:      sodium chloride 0.9%  250 mL Intravenous Once in dialysis     sodium chloride 0.9%  300 mL Hemodialysis Machine Once     abacavir  600 mg Oral QPM     aspirin  81 mg Oral QPM     atorvastatin  40 mg Oral At Bedtime     carvedilol  12.5 mg Oral BID w/meals     cinacalcet  30 mg Oral Once per day on Mon Wed Fri     dapsone  100 mg Oral At Bedtime     darunavir  800 mg Oral At Bedtime     dolutegravir  50 mg Oral At Bedtime     epoetin brittni (EPOGEN,PROCRIT) inj ESRD  10,000 Units Intravenous Once in dialysis     gabapentin  300 mg Oral BID     heparin (porcine)  500 Units Hemodialysis Machine Once in dialysis     irbesartan  300 mg Oral At Bedtime     isosorbide mononitrate  120 mg Oral QPM     magnesium oxide  400 mg Oral At Bedtime     mirtazapine  15 mg Oral At Bedtime     pantoprazole  40 mg Oral Daily     ritonavir  100 mg Oral At Bedtime     sucroferric oxyhydroxide  1,000 mg Oral TID w/meals     umeclidinium  1 puff Inhalation Daily       ALLERGIES:    Allergies as of 03/16/2020 - Reviewed 03/16/2020   Allergen Reaction Noted     Calcium acetate Other (See Comments) 07/27/2017     Contrast dye Other (See Comments) 08/22/2014     Diagnostic x-ray materials Other (See Comments) 09/24/2012     Lisinopril  07/23/2012     Sulfa drugs  07/23/2012       FH:    Family History   Problem Relation Age of Onset     Heart Disease Brother 40        CABG     Kidney Disease Sister      Hypertension Sister      Heart Disease Brother         Dilated aorta       SH:    Social History     Socioeconomic History     Marital status:      Spouse name: Not on file     Number of children: Not on file     Years of education: Not on file     Highest education level: Not on file   Occupational History     Not on file   Social Needs     Financial resource strain: Not on file     Food insecurity     Worry: Not  "on file     Inability: Not on file     Transportation needs     Medical: Not on file     Non-medical: Not on file   Tobacco Use     Smoking status: Former Smoker     Packs/day: 2.00     Years: 41.00     Pack years: 82.00     Types: Cigarettes     Last attempt to quit:      Years since quittin.2     Smokeless tobacco: Never Used   Substance and Sexual Activity     Alcohol use: No     Alcohol/week: 0.0 standard drinks     Drug use: No     Sexual activity: Not on file   Lifestyle     Physical activity     Days per week: Not on file     Minutes per session: Not on file     Stress: Not on file   Relationships     Social connections     Talks on phone: Not on file     Gets together: Not on file     Attends Voodoo service: Not on file     Active member of club or organization: Not on file     Attends meetings of clubs or organizations: Not on file     Relationship status: Not on file     Intimate partner violence     Fear of current or ex partner: Not on file     Emotionally abused: Not on file     Physically abused: Not on file     Forced sexual activity: Not on file   Other Topics Concern     Parent/sibling w/ CABG, MI or angioplasty before 65F 55M? Yes      Service Not Asked     Blood Transfusions Not Asked     Caffeine Concern Yes     Comment: 2 cups daily     Occupational Exposure Not Asked     Hobby Hazards Not Asked     Sleep Concern Yes     Stress Concern Not Asked     Weight Concern Not Asked     Special Diet No     Comment:  more proteins     Back Care Not Asked     Exercise Yes     Comment: Cardiac rehab      Bike Helmet Not Asked     Seat Belt Not Asked     Self-Exams Not Asked   Social History Narrative     Not on file       PHYSICAL EXAM:    BP (!) 87/48   Pulse 77   Temp 96.3  F (35.7  C) (Oral)   Resp 16   Ht 1.803 m (5' 11\")   Wt 90.4 kg (199 lb 6.4 oz)   SpO2 96%   BMI 27.81 kg/m    GENERAL: pleasant, alert, NAD  HEENT:  Normocephalic. No gross abnormalities.  Pupils equal.  " MMM.  CV: RRR, no murmurs, no clicks, gallops, or rubs, no edema  RESP: Clear bilaterally with good efforts. No wheezes or crackles.   GI: Abdomen obese, soft, NT  MUSCULOSKELETAL: extremities nl - no gross deformities noted. No edema.   SKIN: no suspicious lesions or rashes, dry to touch  NEURO:  Strength normal and symmetric. A/o x3  PSYCH: mood good, affect appropriate  LYMPH: No palpable ant/post cervical     LABS:      CBC RESULTS:     Recent Labs   Lab 03/16/20  0755   WBC 6.1   RBC 2.97*   HGB 8.1*   HCT 27.9*   *       BMP RESULTS:  Recent Labs   Lab 03/16/20  0755      POTASSIUM 5.0   CHLORIDE 100   CO2 23   *   CR 10.10*   *   PRABHA 9.3       INR  Recent Labs   Lab 03/16/20  0755   INR 1.08        DIAGNOSTICS:  Reviewed    Shallow inspiratory chest shows some borderline  cardiomegaly with some mild central pulmonary vascular congestion. No  peripheral infiltrates or effusions. Transvenous cardiac device noted  with one lead in the right atrium and one in the right ventricle.    A/P: 70 y.o man with    1.  ESRD              -F [facility]              -AVF              -target weight 86 kg   -3.15hrs  2.  Anemia              -20K units of Epogen              -transfuse as indicated  3.  Mineral Bone Disease   -sensipar 30 mg 3x/week   -Hectorol 1 mcg  4.  CP              -treat per card  5.  HTN              -resume home medications  6.  HIV +    Plan:  # HD  # Lexiscan tomorrow per report    Doyle Marcial MD  [facility] - Nephrology  Office Phone: [phone]  Pager: [phone]

## 2020-03-16 NOTE — H&P
[facility]    History and Physical - Hospitalist Service       Date of Admission:  3/16/2020    Assessment & Plan   Donald Raza is a 70 year old male with CAD (details below), ESRD on HD (MWF), DM2, HTN, HLP, chronic anemia, HIV (on treatment), aortic stenosis, bradycardia now with pacemaker, admission in early March for chest pain/troponin leak admitted on 3/16/2020 with chest pain again    Chest pain in the setting of CAD    - patient follows with Dr. Diaz    - multiple NSTEMI with diagonal and LAD stenting (2015) with repeat LAD/Diagonal stenting due to in-stent restenosis (8/2016)    - repeat CP with angiograms in 2017 and 2018 showing patent stents and non-flow limiting diagonal and side branch disease    - December 2019: admitted to [facility] for CP and had angiogram revealing a 99% lesion in rPDA and 85% DEANNA lesion: had VANITA to dRCA/DEANNA and VANITA to PDA; prior LAD and Diagonal stents were patent with up to 55% narrowing; placed on Plavix    - admitted here from 3/2 to 3/5 for chest pain which was felt to be from his anemia    - patient's symptoms are not suspicious for cardiac related chest pain    - more likely his symptoms are due to overload and needing dialysis    - at this point I think it would be beneficial to have a lexiscan done so that if he has chest pain again, out providers will be able to comfortably send him home if he comes in again with chest pain and an otherwise negative work-up    - initial trop was 0.444, now it is 0.169: likely leak due to overload/needing dialysis. Will trend, but again I feel he needs his dialysis    - cont home Imdur, ASA, carvedilol    - likely sensitive to volume status due to his severe AS    ESRD    - dialysis MWF    - will be dialyzed today    HTN    - cont carvedilol, irbesartan     HLP    - on lipitor      DM    - last HBA1c was 5.1 on 3/2/2020    - uses pre-meal insulin    - will cover with ISS    HIV    - cont anti-virals    - last viral load  was done in Feb and was undetectable    History bradycardia    - has pacemaker    Has moderate-severe aortic stenosis    - had repeat ECHO on 3/3    Full code    Offered to call family. He states he does not need me to call anyone     Diet: dialysis  DVT Prophylaxis: Low Risk/Ambulatory with no VTE prophylaxis indicated  Christie Catheter: not present  Code Status: full code    Disposition Plan   Expected discharge: Tomorrow, recommended to prior living arrangement once testing done.  Entered: Meek West MD 03/16/2020, 11:36 AM     The patient's care was discussed with the Bedside Nurse, Patient and ED provider.    Meek West MD  [facility]    ______________________________________________________________________    Chief Complaint   Chest pain    History is obtained from the patient    History of Present Illness   Dnoald Raza is a 70 year old male with CAD (details below), ESRD on HD (MWF), DM2, HTN, HLP, chronic anemia, HIV (on treatment), aortic stenosis, bradycardia now with pacemaker, admission in early March for chest pain/troponin leak admitted on 3/16/2020 with chest pain again. Patient states he was awake at around 3am to go to the bathroom and he started having chest pain across his chest (both sides). He said the pain went down his right arm. He had associated shortness of breath. No nausea or diaphoresis. The pain continued until he arrived at the ED and received ASA and nitroglycerin. He currently is chest pain free. He denies abdominal pain, n/v/d. No URI or urinary symptoms. No fever or chills. He states he also had shoulder pain (bilateral) that felt like weights pressing down on him. No trauma. No heavy lifting. He states his pain was similar to the one that brought him in at the beginning of the month.    Review of Systems    The 10 point Review of Systems is negative other than noted in the HPI or here.     Past Medical History    I have reviewed this patient's medical  history and updated it with pertinent information if needed.   Past Medical History:   Diagnosis Date     Allergic rhinitis      Anemia due to chronic kidney disease 10/21/2011     CAD S/P percutaneous coronary angioplasty 06/15/2015     Cataract      CKD (chronic kidney disease)      Colon cancer      COPD      Depression      Dilated aortic root 05/06/2016     Diverticulitis      ESRD (end stage renal disease) on dialysis 05/06/2016     Gout      Human immunodeficiency virus (HIV) disease      Hx of squamous cell carcinoma 03/23/2007     Hypertension 2010     Impotence of organic origin      Increased prostate specific antigen (PSA) velocity 08/08/2016    Awaiting bx on blood thinner     Mixed hyperlipidemia      Pulmonary HTN     Mod     TIA (transient ischemic attack) 5/2016     Type 2 diabetes mellitus age 50       Past Surgical History   I have reviewed this patient's surgical history and updated it with pertinent information if needed.  Past Surgical History:   Procedure Laterality Date     ANGIOGRAM  03-04-16    No culprit lesions, stents widely patent      ANGIOGRAM  05-06-16    Cutting balloon ptca=Diag     APPENDECTOMY  2000     CHOLECYSTECTOMY, LAPOROSCOPIC       COLON SURGERY       COLOSTOMY  09/30/1999    Temporary for diverticulitis     EP PACEMAKER N/A 5/2/2019    Procedure: EP Pacemaker;  Surgeon: Angelique Perera MD;  Location:  HEART CARDIAC CATH LAB     HC LEFT HEART CATHETERIZATION  03/12/2018    No significant change  Elevated LVEDp  LVEF 30% No PCI     HEART CATH, ANGIOPLASTY  08-18-16    LAD PCI. Stented with a 3.0 x 8 mm Xience Alpine stent.     IR DIALYSIS FISTULOGRAM LEFT  1/25/2019     STENT, CORONARY, - VANITA=Diag, PTCA=LAD  12/2015     STENT, CORONARY, - VANITA=LAD  06/2015       Social History   I have reviewed this patient's social history and updated it with pertinent information if needed.  Social History     Tobacco Use     Smoking status: Former Smoker     Packs/day: 2.00      Years: 41.00     Pack years: 82.00     Types: Cigarettes     Last attempt to quit:      Years since quittin.2     Smokeless tobacco: Never Used   Substance Use Topics     Alcohol use: No     Alcohol/week: 0.0 standard drinks     Drug use: No       Family History   I have reviewed this patient's family history and updated it with pertinent information if needed.   Family History   Problem Relation Age of Onset     Heart Disease Brother 40        CABG     Kidney Disease Sister      Hypertension Sister      Heart Disease Brother         Dilated aorta       Prior to Admission Medications   Prior to Admission Medications   Prescriptions Last Dose Informant Patient Reported? Taking?   CLONAZEPAM PO  Self Yes No   Sig: Take 0.5 mg by mouth nightly as needed for anxiety (restless legs)   DOXERCALCIFEROL IV  Self Yes No   Sig: (given at dialysis)_   Darunavir Ethanolate (PREZISTA PO)  Self Yes No   Sig: Take 800 mg by mouth At Bedtime.   Isosorbide Mononitrate  MG TB24  Self No No   Sig: Take 1 tablet (120 mg) by mouth every evening   MAGNESIUM OXIDE PO  Self Yes No   Sig: Take 400 mg by mouth At Bedtime   Nutritional Supplements (NEPRO PO)  Self Yes No   Sig: Take 1 Container by mouth 3 times daily 2-3 times daily   abacavir (ZIAGEN) 300 MG tablet  Self Yes No   Sig: Take 600 mg by mouth every evening    albuterol (PROAIR HFA/PROVENTIL HFA/VENTOLIN HFA) 108 (90 BASE) MCG/ACT Inhaler  Self No No   Sig: Inhale 2 puffs into the lungs every 6 hours as needed for shortness of breath / dyspnea or wheezing   aspirin 81 MG EC tablet   Yes No   Sig: Take 81 mg by mouth every evening   atorvastatin (LIPITOR) 40 MG tablet  Self Yes No   Sig: Take 40 mg by mouth At Bedtime   carvedilol (COREG) 12.5 MG tablet   No No   Sig: Take 1 tablet (12.5 mg) by mouth 2 times daily (with meals)   chlorhexidine (CHLORHEXIDINE) 0.12 % solution  Self Yes No   Sig: Rinse and gargle 15 ml by mouth twice a day as directed.   cinacalcet  "(SENSIPAR) 30 MG tablet   Yes No   Sig: Take 30 mg by mouth three times a week On dialysis days   cloNIDine (CATAPRES) 0.1 MG tablet   Yes No   Sig: Take 0.1 mg by mouth daily as needed (for high blood pressure) If sbp > 180   dapsone 100 MG tablet  Self Yes No   Sig: Take 100 mg by mouth At Bedtime    dolutegravir (TIVICAY) 50 MG tablet  Self Yes No   Sig: Take 50 mg by mouth At Bedtime   epoetin brittni (EPOGEN,PROCRIT) 41267 UNIT/ML injection  Self Yes No   Sig: WITH DIALYSIS;   gabapentin (NEURONTIN) 300 MG capsule   Yes No   Sig: Take 300 mg by mouth 2 times daily    gabapentin (NEURONTIN) 300 MG capsule   Yes No   Sig: Take 300 mg by mouth as needed May take after HD.   guaiFENesin (MUCINEX) 600 MG 12 hr tablet   Yes No   Sig: Take 1,200 mg by mouth 2 times daily as needed    imiquimod (ALDARA) 5 % cream  Self Yes No   Sig: Apply topically as needed   insulin lispro (HUMALOG PENFILL) 100 UNIT/ML Cartridge   No No   Sig: Inject 2 Units Subcutaneous 3 times daily (before meals)   insulin lispro (HUMALOG VIAL) 100 UNIT/ML vial   No No   Sig: Inject Subcutaneous 3 times daily (before meals) Takes 2 units for every 50 BG above 150.   ipratropium - albuterol 0.5 mg/2.5 mg/3 mL (DUONEB) 0.5-2.5 (3) MG/3ML neb solution  Self No No   Sig: Take 1 vial (3 mLs) by nebulization every 6 hours as needed for shortness of breath / dyspnea or wheezing   irbesartan (AVAPRO) 300 MG tablet  Self No No   Sig: Take 1 tablet (300 mg) by mouth At Bedtime   ketoconazole (NIZORAL) 2 % cream  Self Yes No   Sig: Apply topically daily Between toes for fungal infection   melatonin 5 MG tablet   Yes No   Sig: Take 5 mg by mouth nightly as needed    mirtazapine (REMERON) 15 MG tablet  Self Yes No   Sig: Take 15 mg by mouth At Bedtime   nitroglycerin (NITROSTAT) 0.4 MG SL tablet  Self No No   Sig: One tablet under the tongue every 5 minutes if needed for chest pain. May repeat every 5 minutes for a maximum of 3 doses in 15 minutes\"   pantoprazole " (PROTONIX) 40 MG EC tablet  Self Yes No   Sig: Take 40 mg by mouth daily   polyethylene glycol (MIRALAX/GLYCOLAX) packet   Yes No   Sig: Take 1 packet by mouth daily as needed for constipation   ritonavir (NORVIR) 100 MG capsule  Self Yes No   Sig: Take 1 capsule by mouth At Bedtime    sucroferric oxyhydroxide (VELPHORO) 500 MG CHEW chewable tablet  Self Yes No   Sig: Take 1,000 mg by mouth 3 times daily (with meals)    umeclidinium (INCRUSE ELLIPTA) 62.5 MCG/INH inhaler   Yes No   Sig: Inhale 1 puff into the lungs daily      Facility-Administered Medications: None     Allergies   Allergies   Allergen Reactions     Calcium Acetate Other (See Comments)     Other reaction(s): Other, see comments  Pain in chest and back  Pain in chest area (sensitive skin)      Contrast Dye Other (See Comments)     Contrast nephropathy     Diagnostic X-Ray Materials Other (See Comments)     PN: renal failure     Lisinopril      Sulfa Drugs        Physical Exam   Vital Signs: Temp: 97.4  F (36.3  C) Temp src: Oral BP: 116/77 Pulse: 77 Heart Rate: 78 Resp: 13 SpO2: 95 % O2 Device: None (Room air)    Weight: 0 lbs 0 oz    Constitutional: awake, alert, cooperative, no apparent distress, and appears stated age  Eyes: Lids and lashes normal, pupils equal, round and reactive to light, extra ocular muscles intact, sclera clear, conjunctiva normal  ENT: Normocephalic, without obvious abnormality, atraumatic, sinuses nontender on palpation, external ears without lesions, oral pharynx with moist mucous membranes, tonsils without erythema or exudates, gums normal and good dentition.  Respiratory: good air movements. Has fine crackles bilaterally  Cardiovascular: Normal apical impulse, regular rate and rhythm, normal S1 and S2, no S3 or S4, and no murmur noted  GI: No scars, normal bowel sounds, soft, non-distended, non-tender, no masses palpated, no hepatosplenomegally  Skin: no bruising or bleeding  Musculoskeletal: no lower extremity pitting  edema present    Data   Data reviewed today: I reviewed all medications, new labs and imaging results over the last 24 hours. I personally reviewed the EKG tracing showing LBBB.    Most Recent 3 CBC's:  Recent Labs   Lab Test 03/16/20  0755 03/05/20  0640 03/04/20  0746   WBC 6.1 5.2 5.5   HGB 8.1* 8.5* 7.2*   MCV 94 92 89   * 125* 120*     Most Recent 3 BMP's:  Recent Labs   Lab Test 03/16/20  0755 03/05/20  0640 03/04/20  0746    132* 132*   POTASSIUM 5.0 3.8 4.6   CHLORIDE 100 96 97   CO2 23 30 24   * 43* 74*   CR 10.10* 5.35* 8.69*   ANIONGAP 12 6 11   PRABHA 9.3 9.8 10.1   * 162* 139*     Most Recent 2 LFT's:  Recent Labs   Lab Test 11/26/19  2213 10/18/19  0546   AST 15 15   ALT 16 12   ALKPHOS 104 76   BILITOTAL 0.6 0.5     Most Recent 3 Troponin's:  Recent Labs   Lab Test 03/16/20  1234 03/16/20  0755 03/03/20  0002  03/08/18  0542  12/25/17  0856  04/15/17  0820   TROPI 0.160* 0.044 0.080*   < > 0.043   < >  --    < >  --    TROPONIN  --   --   --   --  0.02  --  0.02  --  0.02    < > = values in this interval not displayed.     Recent Results (from the past 24 hour(s))   XR Chest 2 Views    Narrative    CHEST TWO VIEWS  3/16/2020 8:14 AM     HISTORY:  Dyspnea.    COMPARISON: 3/2/2020      Impression    IMPRESSION: Shallow inspiratory chest shows some borderline  cardiomegaly with some mild central pulmonary vascular congestion. No  peripheral infiltrates or effusions. Transvenous cardiac device noted  with one lead in the right atrium and one in the right ventricle.    DAVID HERNANDEZ MD

## 2020-03-16 NOTE — ED NOTES
RECEIVING UNIT ED HANDOFF REVIEW    Above ED Nurse Handoff Report was reviewed: Yes  Reviewed by: Nallely Looney RN on March 16, 2020 at 11:42 AM         [facility]  ED Nurse Handoff Report    Donald Raza is a 70 year old male   ED Chief complaint: Chest Pain  . ED Diagnosis:   Final diagnoses:   Chest pain, unspecified type     Allergies:   Allergies   Allergen Reactions     Calcium Acetate Other (See Comments)     Other reaction(s): Other, see comments  Pain in chest and back  Pain in chest area (sensitive skin)      Contrast Dye Other (See Comments)     Contrast nephropathy     Diagnostic X-Ray Materials Other (See Comments)     PN: renal failure     Lisinopril      Sulfa Drugs        Code Status:   Activity level - Baseline/Home:  Assist X 1. Activity Level - Current:   Assist X 2. Lift room needed: No. Bariatric: No   Needed: No   Isolation: No. Infection: Not Applicable.     Vital Signs:   Vitals:    03/16/20 0706 03/16/20 0745 03/16/20 0759 03/16/20 0800   BP: 138/81      Pulse: 80 75     Resp: 13 21 18 9   Temp: 97.4  F (36.3  C)      TempSrc: Oral      SpO2: 94% 94% 95% 94%       Cardiac Rhythm:  ,      Pain level: 0-10 Pain Scale: 2  Patient confused: No. Patient Falls Risk: Yes.   Elimination Status:     Patient Report - Initial Complaint: Chest Pain. Focused Assessment: Pt arrives via EMS from dialysis w/ complaints of chest pain. EMS reports pt's chest pain and SOB began at 0300 this morning across the top of his anterior chest. Reports chest pain was at 8/10 when he arrived at dialysis this morning following taking two nitroglycerin at home. Dialysis called EMS who gave him two more nitroglycerin reducing his pain to a 5/10. Hx of 11 stents and pacemaker. Pain at 2/10 upon arrival, A&O x 4.    Tests Performed: Labs, Xray. Abnormal Results: .   Treatments provided: tylenol, ntg  Family Comments: none present  OBS brochure/video discussed/provided to patient:  No,  Declined. States he's seen it already  ED Medications:   Medications   acetaminophen (TYLENOL) tablet 1,000 mg (1,000 mg Oral Given 3/16/20 0815)   nitroGLYcerin (NITROSTAT) sublingual tablet 0.4 mg (0.4 mg Sublingual Given 3/16/20 0844)     Drips infusing:  No  For the majority of the shift, the patient's behavior Green. Interventions performed were .    Sepsis treatment initiated: No       ED Nurse Name/Phone Number: Simin Garcia RN,   8:47 AM

## 2020-03-16 NOTE — ED PROVIDER NOTES
"  History     Chief Complaint:  Chest Pain    HPI   Donald Raza is a 70 year old male, on dialysis for end stage renal disease, with a history of MI, dilated aortic root, COPD, CAD, hypertension, hyperlipidemia, among others who presents from dialysis via EMS for evaluation of 8/10 in severity chest pain, associated with shortness of breath, that began around 0300. The patient states this morning around 0300 he had acute onset upper chest pain, which radiated down both of his arms and states like \"200 kilos are on top of him\". He also endorsed shortness of breath. His pain and shortness of breath is exacerbated by exertion, such as putting his clothes on this morning where he had to stop 3 times. He presented to his dialysis appointment this morning after taking two nitroglycerins at home who subsequently called EMS for further evaluation.     En route, EMS personnel gave him two nitroglycerin, reducing his pain to a 5/10 in severity.     Here, the patient states his pain is currently a 3/10 in severity and is similar to the pain he had at the beginning of the month where he required a blood transfusion. He denies any other alleviating factors other than nitroglycerin. He also endorses constipation. He denies any cough, abdominal pain, fever, hematochezia, or black/tarry stools.     Of note, the patient is not on at home oxygen at baseline.     Cardiac/PE/DVT Risk Factors:  History of hypertension - Yes  History of hyperlipidemia - Yes  History of diabetes - Yes  History of smoking - Yes  Personal history of PE/DVT - No  Family history of PE/DVT - No  Family history of heart complications - Yes  Cancer - Yes     Allergies:  Calcium acetate  Contrast Dye  Diagnostic x-ray materials  Lisinopril  Sulfa drugs      Medications:    Ziagen  Proair  Aspirin 81 mg   Lipitor  Coreg  Sensipar  Clonazepam  Catapres  Dapsone  Prezista  Tivicay  Epogen  Neurontin  Humalog  Duoneb  Avapro "   Melatonin  Remeron  Nitrostat  Nepro  Protonix  Norvir  Incruse ellipta      Past Medical History:    Anemia due to chronic kidney disease  Coronary artery disease  Cataract  Chronic kidney disease  Colon cancer  COPD  Depression  Dilated aortic root  Diverticulitis   End stage renal disease on dialysis   Gout  HIV  Squamous cell carcinoma  Hypertension  Hyperlipidemia  TIA  Type 2 diabetes mellitus   Huang disease  Pneumonia   AIDS  ACS  MI     Past Surgical History:    Angiogram x 2  Appendectomy  Cholecystectomy  Colon surgery   Colostomy   Pacemaker   Left heart catheterization   Heart cath, angioplasty   Dialysis fistulogram left  Stent coronary x 2  Cataract repair, bilateral   Pancreas surgery   Hernia repair  Implant pacemaker     Family History:    Mother - Cardiovascular disease, Cataract, Diabetes, Glaucoma, Hypertension, Varicose veins  Brother(s) - Cardiovascular disease, Cataract, Stroke, Cancer, Heart disease  Sister - Kidney disease, Hypertension    Social History:  The patient was accompanied to the ED by EMS.  Smoking Status: Former, 2003  Smokeless Tobacco: Never  Alcohol Use: Yes  Drug Use: No   Marital Status:   [2]     Review of Systems   Constitutional: Negative for fever.   Respiratory: Positive for shortness of breath. Negative for cough.    Cardiovascular: Positive for chest pain.   Gastrointestinal: Positive for constipation. Negative for abdominal pain and blood in stool.   All other systems reviewed and are negative.    Physical Exam     Patient Vitals for the past 24 hrs:   BP Temp Temp src Pulse Heart Rate Resp SpO2   03/16/20 0800 -- -- -- -- 77 9 94 %   03/16/20 0759 -- -- -- -- 77 18 95 %   03/16/20 0745 -- -- -- 75 78 21 94 %   03/16/20 0706 138/81 97.4  F (36.3  C) Oral 80 80 13 94 %     Physical Exam  Constitutional: Well developed, chronic ill, nontox appearance  Head: Atraumatic.   Mouth/Throat: Oropharynx is clear and moist.   Neck:  no stridor  Eyes: no scleral  icterus  Cardiovascular: RRR, 2+ R radial pulses, palpable thrill in LUE fistula  Pulmonary/Chest: nml resp effort, Clear BS bilat  Abdominal: ND, +BS, soft, NT, no rebound or guarding   Ext: Warm, well perfused  Neurological: A&O, symmetric facies, moves ext x4  Skin: Skin is warm and dry.   Psychiatric: Behavior is normal. Thought content normal.   Nursing note and vitals reviewed.  Emergency Department Course     ECG:  ECG taken at 0702, ECG read at 0705 by Glenn Sue MD  Sinus rhythm with 1st degree AV block  Left bundle branch block  Abnormal ECG  No significant change compared to EKG dated 3/2/2020  Rate 86 bpm. RI interval 250. QRS duration 178. QT/QTc 438/524. P-R-T axes 37 3 137.      Imaging:  Radiology findings were communicated with the patient and Admitting MD who voiced understanding of the findings.    XR Chest 2 Views  IMPRESSION: Shallow inspiratory chest shows some borderline   cardiomegaly with some mild central pulmonary vascular congestion. No   peripheral infiltrates or effusions. Transvenous cardiac device noted   with one lead in the right atrium and one in the right ventricle.   Reading per radiology.      Laboratory:  Laboratory findings were communicated with the patient and Admitting MD who voiced understanding of the findings.    CBC: HGB 8.1 (L),  (L) o/w WNL (WBC 6.1)   BMP: Glucose 235 (H), Bun 105 (H), Creatinine 10.10 (H), GFR 5 (L) o/w WNL  Troponin (Collected 37014): 0.044   D Dimer (Collected 0755): 0.4   INR: 1.08     ABO/Rh type and screen: O Rh Positive. Antibodies Negative.      Interventions:  0815 Tylenol 1000 mg PO  0844 Nitrostat 0.4 mg Sublingual     Emergency Department Course:  Past medical records, nursing notes, and vitals reviewed.  EKG obtained in the ED, see results above.    The patient was sent for a XR Chest 2 Views while in the emergency department, results above.    IV was inserted and blood was drawn for laboratory testing, results above.      (1087)   I performed an exam of the patient as documented above. History obtained from patient.     (7038)   I rechecked the patient and discussed results and plan of care. I discussed the plan for admission to the hospital and the patient agrees with admission.     (4018)   I spoke with Dr. West of the Hospitalist service regarding patient's presentation, findings, and plan of care. She recommended speaking with nephrology to arrange dialysis for the patient.     (3704)   Nephrology paged.     (9811)   Nephrology repaged.     (8939)   Nephrology repaged.     (3047)   I spoke with Dr. Marcial of the Nephrology service regarding patient's presentation, findings, and plan of care.      Findings and plan explained to the Patient who consents to admission. Discussed the patient with Dr. West, who will admit the patient to an observation with telemetry bed for further monitoring, evaluation, and treatment. I personally reviewed the laboratory and imaging results with the Patient and answered all related questions prior to admission.     Impression & Plan   Medical Decision Makin year old male presenting w/ dyspnea, chest pressure     DDx includes symptomatic anemia, ACS although less likely given previous work-up, pneumonia, ptx.  Doubt PE given symptomatology similar to previous symptomatic anemia; will reconsider PE work-up should Hgb be normal.  Labs significant for troponin within normal limits, d-dimer undetectable, hemoglobin greater than 8.  Imaging sig for no acute cardiopulmonary disease as noted above.  Given patient has a normal hemoglobin level, unable to attribute his chest pain to symptomatic anemia.  Doubt PE given d-dimer within normal limits.  Given no acute findings on chest x-ray, I think would be reasonable to admit the patient to the hospital service for observation for further evaluation and management, consideration of stress test.  This could be mild volume overload contributing to the  patient's symptoms, and it would be reasonable to dialyze the patient during this admission.  The patient was subsequently admitted to the hospitalist service under observation status, and I spoke with nephrology to arrange dialysis.  Pt counseled on all results, disposition and diagnosis.  They are understanding and agreeable to plan. Patient admitted in stable condition.        Diagnosis:    ICD-10-CM    1. Chest pain, unspecified type  R07.9 ABO/Rh type and screen     Disposition:  Admitted to an observation with telemetry bed under the care of Dr. West.     Scribe Disclosure:  IManuelito, am serving as a scribe at 6:59 AM on 3/16/2020 to document services personally performed by Glenn Sue MD based on my observations and the provider's statements to me.   3/16/2020   [facility] EMERGENCY DEPARTMENT       Glenn Sue MD  03/16/20 9386

## 2020-03-16 NOTE — ED TRIAGE NOTES
Pt arrives via EMS from dialysis w/ complaints of chest pain. EMS reports pt's chest pain and SOB began at 0300 this morning across the top of his anterior chest. Reports chest pain was at 8/10 when he arrived at dialysis this morning following taking two nitroglycerin at home. Dialysis called EMS who gave him two more nitroglycerin reducing his pain to a 5/10. Hx of 11 stents and pacemaker. Pain at 2/10 upon arrival, A&O x 4.

## 2020-03-16 NOTE — PHARMACY-ADMISSION MEDICATION HISTORY
Admission medication history interview status for this patient is complete. See [facility] admission navigator for allergy information, prior to admission medications and immunization status.     Medication history interview source(s):Patient  Medication history resources (including written lists, pill bottles, clinic record):None    Changes made to PTA medication list:  Added: none  Deleted: none  Changed: none    Actions taken by pharmacist (provider contacted, etc):None     Additional medication history information:Dialysis MWF    Medication reconciliation/reorder completed by provider prior to medication history?  Y   (Y/N)     For patients on insulin therapy: Y  (Y/N)  Sliding scale Novolog: Y  Do you have a baseline novolog pre-meal dose:   _2_  units with meals or __ units/carb unit with meals  Do you eat three meals a day:  2-3  How many times do you check your blood glucose per day:  1  How many episodes of hypoglycemia do you have per week: 0  Do you have a Continuous glucose monitor (CGM) : no (remind pt that not approved for hospital use)  Any specific barriers to therapy? N  (cost, comfortable with injections, confident with current diabetes regimen?)      Prior to Admission medications    Medication Sig Last Dose Taking? Auth Provider   abacavir (ZIAGEN) 300 MG tablet Take 600 mg by mouth every evening  3/15/2020 at Unknown time Yes Abstract, Provider   albuterol (PROAIR HFA/PROVENTIL HFA/VENTOLIN HFA) 108 (90 BASE) MCG/ACT Inhaler Inhale 2 puffs into the lungs every 6 hours as needed for shortness of breath / dyspnea or wheezing  Yes Nelson Worthy MD   aspirin 81 MG EC tablet Take 81 mg by mouth every evening 3/15/2020 at Unknown time Yes Unknown, Entered By History   atorvastatin (LIPITOR) 40 MG tablet Take 40 mg by mouth At Bedtime 3/15/2020 at Unknown time Yes Unknown, Entered By History   carvedilol (COREG) 12.5 MG tablet Take 1 tablet (12.5 mg) by mouth 2 times daily (with meals) 3/15/2020 at  Unknown time Yes Jovi Hayes MD   chlorhexidine (CHLORHEXIDINE) 0.12 % solution Rinse and gargle 15 ml by mouth twice a day as directed. 3/15/2020 at Unknown time Yes Abstract, Provider   cinacalcet (SENSIPAR) 30 MG tablet Take 30 mg by mouth three times a week On dialysis days 3/13/2020 at Unknown time Yes Unknown, Entered By History   CLONAZEPAM PO Take 0.5 mg by mouth nightly as needed for anxiety (restless legs)  Yes Unknown, Entered By History   cloNIDine (CATAPRES) 0.1 MG tablet Take 0.1 mg by mouth daily as needed (for high blood pressure) If sbp > 180  Yes Unknown, Entered By History   dapsone 100 MG tablet Take 100 mg by mouth At Bedtime  3/15/2020 at Unknown time Yes Reported, Patient   Darunavir Ethanolate (PREZISTA PO) Take 800 mg by mouth At Bedtime. 3/15/2020 at Unknown time Yes Reported, Patient   dolutegravir (TIVICAY) 50 MG tablet Take 50 mg by mouth At Bedtime 3/15/2020 at Unknown time Yes Unknown, Entered By History   DOXERCALCIFEROL IV (given at dialysis)_  Yes Reported, Patient   epoetin brittni (EPOGEN,PROCRIT) 24389 UNIT/ML injection WITH DIALYSIS;  Yes Unknown, Entered By History   gabapentin (NEURONTIN) 300 MG capsule Take 300 mg by mouth as needed May take after HD.  Yes Unknown, Entered By History   gabapentin (NEURONTIN) 300 MG capsule Take 300 mg by mouth 2 times daily  3/15/2020 at Unknown time Yes Unknown, Entered By History   guaiFENesin (MUCINEX) 600 MG 12 hr tablet Take 1,200 mg by mouth 2 times daily as needed   Yes Unknown, Entered By History   imiquimod (ALDARA) 5 % cream Apply topically as needed  Yes Reported, Patient   insulin lispro (HUMALOG PENFILL) 100 UNIT/ML Cartridge Inject 2 Units Subcutaneous 3 times daily (before meals) 3/15/2020 at Unknown time Yes Jovi Hayes MD   insulin lispro (HUMALOG VIAL) 100 UNIT/ML vial Inject Subcutaneous 3 times daily (before meals) Takes 2 units for every 50 BG above 150. 3/15/2020 at Unknown time Yes Jovi Hayes MD  "  ipratropium - albuterol 0.5 mg/2.5 mg/3 mL (DUONEB) 0.5-2.5 (3) MG/3ML neb solution Take 1 vial (3 mLs) by nebulization every 6 hours as needed for shortness of breath / dyspnea or wheezing  Yes Catrachito Rosado DO   irbesartan (AVAPRO) 300 MG tablet Take 1 tablet (300 mg) by mouth At Bedtime 3/15/2020 at Unknown time Yes Clemencia Pal MD   Isosorbide Mononitrate  MG TB24 Take 1 tablet (120 mg) by mouth every evening 3/15/2020 at Unknown time Yes Yuki Hinojosa APRN CNP   ketoconazole (NIZORAL) 2 % cream Apply topically daily Between toes for fungal infection 3/15/2020 at Unknown time Yes Reported, Patient   MAGNESIUM OXIDE PO Take 400 mg by mouth At Bedtime 3/15/2020 at Unknown time Yes Unknown, Entered By History   melatonin 5 MG tablet Take 5 mg by mouth nightly as needed   Yes Reported, Patient   mirtazapine (REMERON) 15 MG tablet Take 15 mg by mouth At Bedtime 3/15/2020 at Unknown time Yes Reported, Patient   nitroglycerin (NITROSTAT) 0.4 MG SL tablet One tablet under the tongue every 5 minutes if needed for chest pain. May repeat every 5 minutes for a maximum of 3 doses in 15 minutes\"  Yes Lay Paz MD   Nutritional Supplements (NEPRO PO) Take 1 Container by mouth 3 times daily 2-3 times daily 3/15/2020 at Unknown time Yes Unknown, Entered By History   pantoprazole (PROTONIX) 40 MG EC tablet Take 40 mg by mouth daily 3/15/2020 at Unknown time Yes Unknown, Entered By History   polyethylene glycol (MIRALAX/GLYCOLAX) packet Take 1 packet by mouth daily as needed for constipation  Yes Reported, Patient   ritonavir (NORVIR) 100 MG capsule Take 1 capsule by mouth At Bedtime  3/15/2020 at Unknown time Yes Reported, Patient   sucroferric oxyhydroxide (VELPHORO) 500 MG CHEW chewable tablet Take 1,000 mg by mouth 3 times daily (with meals)  3/15/2020 at Unknown time Yes Unknown, Entered By History   umeclidinium (INCRUSE ELLIPTA) 62.5 MCG/INH inhaler Inhale 1 puff into the lungs daily 3/15/2020 " at Unknown time Yes Unknown, Entered By History

## 2020-03-16 NOTE — PROGRESS NOTES
Potassium   Date Value Ref Range Status   03/16/2020 5.0 3.4 - 5.3 mmol/L Final     Hemoglobin   Date Value Ref Range Status   03/16/2020 8.1 (L) 13.3 - 17.7 g/dL Final     Creatinine   Date Value Ref Range Status   03/16/2020 10.10 (H) 0.66 - 1.25 mg/dL Final     Urea Nitrogen   Date Value Ref Range Status   03/16/2020 105 (H) 7 - 30 mg/dL Final     Sodium   Date Value Ref Range Status   03/16/2020 135 133 - 144 mmol/L Final     INR   Date Value Ref Range Status   03/16/2020 1.08 0.86 - 1.14 Final       DIALYSIS PROCEDURE NOTE  Hepatitis status of previous patient on machine log was checked and verified ok to use with this patients hepatitis status.  Patient dialyzed for 3.25 hrs. on a 2 K bath with a net fluid removal of  3L.  A BFR of 450 ml/min was obtained via a FLA using 15 gauge needles.    The treatment plan was dicussed with Dr. Marcial during the treatment.  Total heparin received during the treatment: 2000 units.   Needle cannulation sites held x 15min, pressure dressings applied after hemostasis.    Meds  given: Epogen Complications: none    Procedure discussed with patient.  Patient has a good understanding and there were no barriers to his learning   ICEBOAT? Timeout performed pre-treatment  I: Patient was identified using 2 identifiers  C: Consent obtained/verified current before treatment  E: Equipment preventative maintenance is current and dialysis delivery system OK to use  B: Hepatitis B Surface Antigen: negative; Draw Date: 10/29/19      Hepatitis B Surface Antibody: immune; Draw Date: 11/21/19  O: Dialysis orders present and complete prior to treatment  A: Vascular access verified and assessed prior to treatment  T: Treatment was performed at a clinically appropriate time  ?: Patient was allowed to ask questions and address concerns prior to treatment  See flowsheet in EPIC for further details and post assessment.  Machine water alarm in place and functioning. Transducer pods intact and checked  every 15min.  Pt returned via wheelchair  Report received from: Hernan SILVERMAN  Report given to: Hernan SILVERMAN  Chlorine/Chloramine water system checked every 4 hours.  Outpatient Dialysis at [facility]

## 2020-03-16 NOTE — PROGRESS NOTES
ROOM # 229    Living Situation (if not independent, order SW consult): lives at home with wife and family    :   Pura Raza  Daughter     Mobile: [phone]          Activity level at baseline: ind  Activity level on admit: SBA gait belt and walker      Patient registered to observation; given Patient Bill of Rights; given the opportunity to ask questions about observation status and their plan of care.  Patient has been oriented to the observation room, bathroom and call light is in place.    Discussed discharge goals and expectations with patient/family.

## 2020-03-16 NOTE — PROGRESS NOTES
Cross Cover:     Notified by nursing that troponin increased to 0.405 with patient not actively having chest pain at this time. Will add additional troponin this evening but will continue with plan for Lexiscan in AM.    Lisa Ceja PA-C

## 2020-03-16 NOTE — PLAN OF CARE
PRIMARY DIAGNOSIS: CHEST PAIN  OUTPATIENT/OBSERVATION GOALS TO BE MET BEFORE DISCHARGE:  1. Ruled out acute coronary syndrome (negative or stable Troponin):  trops 0.044/0.160 - next trop draw at 1700  2. Pain Status: Pain free.  3. Appropriate provocative testing performed: Yes  - Stress Test Procedure: lexiscan in AM  - Interpretation of cardiac rhythm per telemetry tech: will apply when pt returns from dialysis    4. Cleared by Consultants (if applicable):N/A  5. Return to near baseline physical activity: Yes, SBA walker  Discharge Planner Nurse   Safe discharge environment identified: Yes - back home with family  Barriers to discharge: No       Entered by: Nallely Looney 03/16/2020 3:44 PM     Please review provider order for any additional goals.   Nurse to notify provider when observation goals have been met and patient is ready for discharge.  Patient currently up at dialysis.

## 2020-03-17 NOTE — DISCHARGE SUMMARY
[facility]  Hospitalist Discharge Summary       Date of Admission:  3/16/2020  Date of Discharge:  3/17/2020  Discharging Provider: Meek West MD      Discharge Diagnoses   Chest pain    Follow-ups Needed After Discharge   Follow-up Appointments     Follow-up and recommended labs and tests       Follow up with primary care provider, Sukh Owen, within 7 days for   hospital follow- up.  No follow up labs or test are needed. Dialysis as   previously scheduled             Unresulted Labs Ordered in the Past 30 Days of this Admission     No orders found for last 31 day(s).      These results will be followed up by NA    Discharge Disposition   Discharged to home  Condition at discharge: Stable    Hospital Course   Donald Raza is a 70 year old male with CAD (details below), ESRD on HD (MWF), DM2, HTN, HLP, chronic anemia, HIV (on treatment), aortic stenosis, bradycardia now with pacemaker, admission in early March for chest pain/troponin leak admitted on 3/16/2020 with chest pain again. Patient states he was awake at around 3am to go to the bathroom and he started having chest pain across his chest (both sides). He said the pain went down his right arm. He had associated shortness of breath. No nausea or diaphoresis. The pain continued until he arrived at the ED and received ASA and nitroglycerin. He currently is chest pain free. He denies abdominal pain, n/v/d. No URI or urinary symptoms. No fever or chills. He states he also had shoulder pain (bilateral) that felt like weights pressing down on him. No trauma. No heavy lifting. He states his pain was similar to the one that brought him in at the beginning of the month.     Plan:  Chest pain in the setting of CAD    - patient follows with Dr. Diaz    - multiple NSTEMI with diagonal and LAD stenting (2015) with repeat LAD/Diagonal stenting due to in-stent restenosis (8/2016)    - repeat CP with angiograms in 2017 and 2018 showing patent stents  and non-flow limiting diagonal and side branch disease    - December 2019: admitted to [facility] for CP and had angiogram revealing a 99% lesion in rPDA and 85% DEANNA lesion: had VANITA to dRCA/DEANNA and VANITA to PDA; prior LAD and Diagonal stents were patent with up to 55% narrowing; placed on Plavix    - admitted here from 3/2 to 3/5 for chest pain which was felt to be from his anemia    - patient's symptoms are not suspicious for cardiac related chest pain    - more likely his symptoms are due to overload and needing dialysis    - at this point I think it would be beneficial to have a lexiscan done so that if he has chest pain again, out providers will be able to comfortably send him home if he comes in again with chest pain and an otherwise negative work-up    - initial trop was 0.444, now it is 0.169: likely leak due to overload/needing dialysis. Will trend, but again I feel he needs his dialysis    - cont home Imdur, ASA, carvedilol    - likely sensitive to volume status due to his severe AORTIC STENOSIS    Patient had stable trops. He had a leiscan today which was negative for ischemia. He will discharge home. Today he is feeling better. No chest pain. No new complaints      Consultations This Hospital Stay   NEPHROLOGY IP CONSULT  CARE COORDINATOR IP CONSULT    Code Status   Full Code    Time Spent on this Encounter   I, Meek West MD, personally saw the patient today and spent greater than 30 minutes discharging this patient.       Meek West MD  [facility]  ______________________________________________________________________    Physical Exam   Vital Signs: Temp: 95.6  F (35.3  C) Temp src: Oral BP: 114/57 Pulse: 60 Heart Rate: 65 Resp: 18 SpO2: 95 % O2 Device: None (Room air) Oxygen Delivery: 1 LPM  Weight: 199 lbs 6.39 oz  Constitutional: awake, alert, cooperative, no apparent distress, and appears stated age  Eyes: Lids and lashes normal, pupils equal, round and reactive to light,  extra ocular muscles intact, sclera clear, conjunctiva normal  ENT: Normocephalic, without obvious abnormality, atraumatic, sinuses nontender on palpation, external ears without lesions, oral pharynx with moist mucous membranes, tonsils without erythema or exudates, gums normal and good dentition.  Respiratory: good air movement. Clear on the right. Occ crackle on the left  Cardiovascular: Normal apical impulse, regular rate and rhythm, normal S1 and S2, no S3 or S4, and no murmur noted  GI: No scars, normal bowel sounds, soft, non-distended, non-tender, no masses palpated, no hepatosplenomegally  Skin: no bruising or bleeding  Musculoskeletal: no lower extremity pitting edema present       Primary Care Physician   Sukh Owen    Discharge Orders      Reason for your hospital stay    Chest pain. Likely fluid overload and needed dialysis. Stress test was negative.     Follow-up and recommended labs and tests     Follow up with primary care provider, Sukh Owen, within 7 days for hospital follow- up.  No follow up labs or test are needed. Dialysis as previously scheduled     Activity    Your activity upon discharge: activity as tolerated     Diet    Follow this diet upon discharge: Orders Placed This Encounter      Dialysis Diet       Significant Results and Procedures   Most Recent 3 CBC's:  Recent Labs   Lab Test 03/17/20  0559 03/16/20  0755 03/05/20  0640   WBC 5.4 6.1 5.2   HGB 7.9* 8.1* 8.5*   MCV 92 94 92   * 138* 125*     Most Recent 3 BMP's:  Recent Labs   Lab Test 03/17/20  0559 03/16/20  1803 03/16/20  0755 03/05/20  0640   *  --  135 132*   POTASSIUM 4.5  --  5.0 3.8   CHLORIDE 96  --  100 96   CO2 27  --  23 30   BUN 56*  --  105* 43*   CR 6.71*  --  10.10* 5.35*   ANIONGAP 8  --  12 6   PRABHA 9.4  --  9.3 9.8   * 107* 235* 162*     Most Recent 2 LFT's:  Recent Labs   Lab Test 11/26/19  2213 10/18/19  0546   AST 15 15   ALT 16 12   ALKPHOS 104 76   BILITOTAL 0.6 0.5     Most  Recent 3 Troponin's:  Recent Labs   Lab Test 03/17/20  0019 03/16/20  1803 03/16/20  1234  03/08/18  0542  12/25/17  0856  04/15/17  0820   TROPI 0.371* 0.405* 0.160*   < > 0.043   < >  --    < >  --    TROPONIN  --   --   --   --  0.02  --  0.02  --  0.02    < > = values in this interval not displayed.   ,   Results for orders placed or performed during the hospital encounter of 03/16/20   XR Chest 2 Views    Narrative    CHEST TWO VIEWS  3/16/2020 8:14 AM     HISTORY:  Dyspnea.    COMPARISON: 3/2/2020      Impression    IMPRESSION: Shallow inspiratory chest shows some borderline  cardiomegaly with some mild central pulmonary vascular congestion. No  peripheral infiltrates or effusions. Transvenous cardiac device noted  with one lead in the right atrium and one in the right ventricle.    DAVID HERNANDEZ MD   NM MPI w Lexiscan     Value    Target     Nuc Rest EF 48    Left Ventricular EF 52    Stress/rest perfusion ratio 1.04    Narrative    ?   The nuclear stress test is negative for inducible myocardial ischemia   or infarction.  ?   The left ventricular ejection fraction at rest is 48%.  The left   ventricular ejection fraction at stress is 52%.  ?   LV cavity size mildly dilated.  ?   Stress to rest cavity ratio is 1.04.  ?   Reduced sensitivity due to diaphragmatic soft tissue attenuation.  ?   A prior study was conducted on 12/12/2018.  This study has no   significant change when compared with the prior study.           Discharge Medications   Current Discharge Medication List      CONTINUE these medications which have NOT CHANGED    Details   abacavir (ZIAGEN) 300 MG tablet Take 600 mg by mouth every evening       albuterol (PROAIR HFA/PROVENTIL HFA/VENTOLIN HFA) 108 (90 BASE) MCG/ACT Inhaler Inhale 2 puffs into the lungs every 6 hours as needed for shortness of breath / dyspnea or wheezing  Qty: 1 Inhaler, Refills: 1    Associated Diagnoses: Cough      aspirin 81 MG EC tablet Take 81 mg by mouth every  evening      atorvastatin (LIPITOR) 40 MG tablet Take 40 mg by mouth At Bedtime      carvedilol (COREG) 12.5 MG tablet Take 1 tablet (12.5 mg) by mouth 2 times daily (with meals)  Qty: 60 tablet, Refills: 1    Associated Diagnoses: Hypertensive emergency      chlorhexidine (CHLORHEXIDINE) 0.12 % solution Rinse and gargle 15 ml by mouth twice a day as directed.      cinacalcet (SENSIPAR) 30 MG tablet Take 30 mg by mouth three times a week On dialysis days      CLONAZEPAM PO Take 0.5 mg by mouth nightly as needed for anxiety (restless legs)      cloNIDine (CATAPRES) 0.1 MG tablet Take 0.1 mg by mouth daily as needed (for high blood pressure) If sbp > 180      dapsone 100 MG tablet Take 100 mg by mouth At Bedtime       Darunavir Ethanolate (PREZISTA PO) Take 800 mg by mouth At Bedtime.      dolutegravir (TIVICAY) 50 MG tablet Take 50 mg by mouth At Bedtime      DOXERCALCIFEROL IV (given at dialysis)_      epoetin brittni (EPOGEN,PROCRIT) 05770 UNIT/ML injection WITH DIALYSIS;      !! gabapentin (NEURONTIN) 300 MG capsule Take 300 mg by mouth as needed May take after HD.      !! gabapentin (NEURONTIN) 300 MG capsule Take 300 mg by mouth 2 times daily       guaiFENesin (MUCINEX) 600 MG 12 hr tablet Take 1,200 mg by mouth 2 times daily as needed       imiquimod (ALDARA) 5 % cream Apply topically as needed      insulin lispro (HUMALOG PENFILL) 100 UNIT/ML Cartridge Inject 2 Units Subcutaneous 3 times daily (before meals)  Qty: 3 mL, Refills: 3    Associated Diagnoses: Type 2 diabetes mellitus with hyperosmolarity without coma, with long-term current use of insulin (H)      insulin lispro (HUMALOG VIAL) 100 UNIT/ML vial Inject Subcutaneous 3 times daily (before meals) Takes 2 units for every 50 BG above 150.  Qty: 3 mL, Refills: 3    Associated Diagnoses: Type 2 diabetes mellitus with hyperosmolarity without coma, with long-term current use of insulin (H)      ipratropium - albuterol 0.5 mg/2.5 mg/3 mL (DUONEB) 0.5-2.5 (3)  "MG/3ML neb solution Take 1 vial (3 mLs) by nebulization every 6 hours as needed for shortness of breath / dyspnea or wheezing  Qty: 90 vial, Refills: 1    Associated Diagnoses: Acute respiratory failure with hypoxia (H)      irbesartan (AVAPRO) 300 MG tablet Take 1 tablet (300 mg) by mouth At Bedtime  Qty: 30 tablet, Refills: 0    Associated Diagnoses: Hypertension secondary to other renal disorders      Isosorbide Mononitrate  MG TB24 Take 1 tablet (120 mg) by mouth every evening  Qty: 30 tablet, Refills: 11    Associated Diagnoses: Coronary artery disease involving native heart, angina presence unspecified, unspecified vessel or lesion type; Hypertension secondary to other renal disorders      ketoconazole (NIZORAL) 2 % cream Apply topically daily Between toes for fungal infection      MAGNESIUM OXIDE PO Take 400 mg by mouth At Bedtime      melatonin 5 MG tablet Take 5 mg by mouth nightly as needed       mirtazapine (REMERON) 15 MG tablet Take 15 mg by mouth At Bedtime      nitroglycerin (NITROSTAT) 0.4 MG SL tablet One tablet under the tongue every 5 minutes if needed for chest pain. May repeat every 5 minutes for a maximum of 3 doses in 15 minutes\"  Qty: 25 tablet, Refills: 3    Associated Diagnoses: Coronary artery disease due to lipid rich plaque; Status post coronary angioplasty      Nutritional Supplements (NEPRO PO) Take 1 Container by mouth 3 times daily 2-3 times daily      pantoprazole (PROTONIX) 40 MG EC tablet Take 40 mg by mouth daily      polyethylene glycol (MIRALAX/GLYCOLAX) packet Take 1 packet by mouth daily as needed for constipation      ritonavir (NORVIR) 100 MG capsule Take 1 capsule by mouth At Bedtime       sucroferric oxyhydroxide (VELPHORO) 500 MG CHEW chewable tablet Take 1,000 mg by mouth 3 times daily (with meals)       umeclidinium (INCRUSE ELLIPTA) 62.5 MCG/INH inhaler Inhale 1 puff into the lungs daily       !! - Potential duplicate medications found. Please discuss with " provider.        Allergies   Allergies   Allergen Reactions     Calcium Acetate Other (See Comments)     Other reaction(s): Other, see comments  Pain in chest and back  Pain in chest area (sensitive skin)      Contrast Dye Other (See Comments)     Contrast nephropathy     Diagnostic X-Ray Materials Other (See Comments)     PN: renal failure     Lisinopril      Sulfa Drugs

## 2020-03-17 NOTE — CONSULTS
CTS identifies pt as high risk due to elevated MUSHTAQ. Pt has been admitted 7 times in the past 6 months. Currently admitted for chest pain thought to be related to his volume status and need for HD.     Met w/ pt at bedside, he reports that he lives at home with his wife and brother. He [facility]ends HD on MWF at [facility]. He is open to home RN/PT/OT through [facility]. He ambulates w/ WW at baseline. He does not use home O2. Pt denies having any needs other than assistance in scheduling follow-up appointments. Cardiology follow-up scheduled for Tuesday 3/31 at 12:30pm w/ June Ashish GUZMAN.     Attempted to arrange a hospital follow-up appointment for pt per his request but his PCP Dr. Owen is not available on Tuesdays or Thursdays, apparently. Pt plans to call to schedule with another provider.     AVS discharge instructions were updated for the pt.     No handoff needed as no gap in care identified.      CM will continue to follow patient for any additional discharge needs.     Radha Mayfield RN BSN   Inpatient Care Coordination  [facility]   [phone]

## 2020-03-17 NOTE — PLAN OF CARE
Patient's After Visit Summary was reviewed with patient.  Patient verbalized understanding of After Visit Summary, recommended follow up and was given an opportunity to ask questions.   Discharge medications sent home with patient/family: no new med changes.  Discharged with spouse.      OBSERVATION patient END time: 4935

## 2020-03-17 NOTE — DISCHARGE INSTRUCTIONS
Your home care referral was sent to [facility]  If you haven't heard from them within the next 24-48 hours,  Please call them at [phone]    Your Cardiology follow-up was scheduled w/ Yuki Hinojosa CNP for Tuesday 3/31 at 12:30pm at [facility]    We attempted to schedule you with Dr. Owen as you requested but he was not available on Tuesdays or Thursdays, you may want to consider establishing care with another provider. You had mentioned Dr. Aida Thomas, below is the contact information for her, as you are aware she is an Infectious Disease doctor:    [facility]  [address] [place] [address]   Specialty - Infectious Disease    Phone: [phone]

## 2020-03-17 NOTE — PLAN OF CARE
PRIMARY DIAGNOSIS: CHEST PAIN  OUTPATIENT/OBSERVATION GOALS TO BE MET BEFORE DISCHARGE:  1. Ruled out acute coronary syndrome (negative or stable Troponin):  No- Serial Troponins still trending up- added on Troponin for 0000  2. Pain Status: Pain free.  3. Appropriate provocative testing performed: No  - Stress Test Procedure: Lesiscan  - Interpretation of cardiac rhythm per telemetry tech: SR w/ 1*AVB w/ BBB    4. Cleared by Consultants (if applicable):No  5. Return to near baseline physical activity: Yes  Discharge Planner Nurse   Safe discharge environment identified: Yes  Barriers to discharge: No          Please review provider order for any additional goals.   Nurse to notify provider when observation goals have been met and patient is ready for discharge.

## 2020-03-17 NOTE — PLAN OF CARE
"PRIMARY DIAGNOSIS: CHEST PAIN  OUTPATIENT/OBSERVATION GOALS TO BE MET BEFORE DISCHARGE:  1. Ruled out acute coronary syndrome (negative or stable Troponin):  Yes: 0.044/0.160/0.405/0.371  2. Pain Status: Pain free.  3. Appropriate provocative testing performed: Yes  - Stress Test Procedure: Lexiscan  - Interpretation of cardiac rhythm per telemetry tech: SR with 1st degree AVB&BBB    4. Cleared by Consultants (if applicable):N/A  5. Return to near baseline physical activity: Yes, SBA  Discharge Planner Nurse   Safe discharge environment identified: Yes  Barriers to discharge: No       Entered by: Nallely Looney 03/17/2020 7:57 AM     Please review provider order for any additional goals.   Nurse to notify provider when observation goals have been met and patient is ready for discharge.  /63   Pulse 60   Temp 97.5  F (36.4  C) (Oral)   Resp 18   Ht 1.803 m (5' 11\")   Wt 90.4 kg (199 lb 6.4 oz)   SpO2 95%   BMI 27.81 kg/m      Patient sitting upright in bed eating cookies prior to stress test. Denying all CP, tele unchanged. Plan to go down to stress test in about one hour. .  "

## 2020-03-17 NOTE — PROGRESS NOTES
AVS printed and given to pt prior to CM completing documentation in discharge instructions. Called pt to offer to e-mail, he requests this be mailed to him. AVS printed and will be mailed to pt's home address.     CM will continue to follow patient for any additional discharge needs.     Radha Mayfield RN BSN   Inpatient Care Coordination     Phone [phone]

## 2020-03-30 PROBLEM — R09.02 HYPOXIA: Status: ACTIVE | Noted: 2020-01-01

## 2020-03-30 NOTE — PROGRESS NOTES
"PRIMARY DIAGNOSIS: CHEST PAIN/HYPOXIA   OUTPATIENT/OBSERVATION GOALS TO BE MET BEFORE DISCHARGE:  1. Ruled out acute coronary syndrome (negative or stable Troponin):  Yes  2. Pain Status: Nitroglycerin at needed   3. Appropriate provocative testing performed: NA  - Stress Test Procedure: NA  - Interpretation of cardiac rhythm per telemetry tech: SR 1 degree AV block    4. Cleared by Consultants (if applicable):No  5. Return to near baseline physical activity: No  Discharge Planner Nurse   Safe discharge environment identified: Yes  Barriers to discharge: Yes        Entered by: Lilli Zambrano 03/30/2020 9:25 AM     Please review provider order for any additional goals.   Nurse to notify provider when observation goals have been met and patient is ready for discharge.  Patient is alert and oriented x4. BP soft this morning. Lung sounds diminished in posterior lobes. Patient is on 2 liters of O2 NC. Patient does complain of SOB at rest. Active bowel sounds in all 4 quadrants with LBM yesterday. Dialysis patient and patient states he makes no urine. Denies nausea. Patient rating chest pain a 3-4/10 and stated he did not need anything for pain at this time. Trops went from 0.073 to 1.772 and PA updated. BNP at 134,173, Na 132, and K at 5.4. Dialysis scheduled for today. SL. Dialysis diet. 1 assist walker and gait belt.   BP 97/53 (BP Location: Right arm)   Pulse 83   Temp 96.5  F (35.8  C) (Oral)   Resp 18   Ht 1.803 m (5' 11\")   Wt 89.7 kg (197 lb 11.2 oz)   SpO2 95%   BMI 27.57 kg/m      "

## 2020-03-30 NOTE — CONSULTS
CTS     Care Transition Initial Assessment - RN        Met with: Patient.  DATA   Active Problems:    Hypoxia       Cognitive Status: awake, alert and oriented.  Contact information and PCP information verified: Yes        Insurance concerns: No Insurance issues identified  ASSESSMENT  Patient currently receives the following services:  None. Patient completed [facility] RN SW OT PT services on 3/24/2020        Identified issues/concerns regarding health management:   Patient identifies high risk with elevated MUSHTAQ. Patient has x3 inpatient visit this February 2020. Patient has a complex medical history, including CAD, DM2, HIV and ESRD requiring HD MWF. Patient admitted 3/30 with CP and SOB. His last 3 admissions have been related to CP. Patient had lexiscan 3/17/20 which was negative for ischemia. Patient currently receiving hemodialysis.     Met with patient for discharge planning and Elevated MUSHTAQ. Patient reports he lives at home with his wife and brother. States he was receiving home care services thru [facility].  [facility] services ended 3/24/2020. Patient denies further home care needs at this time. He ambulates with a walker. States his daughter, wife and family are supportive and provide assistance as needed. He reports having dialysis MWF at [facility] in [place]. Daughter and wife provide transportation.     Patient is followed by his PCP, Dr. Sukh Owen, with [facility]. He is followed by  Nephrology and  Endocrinology Clinic. Patient is also followed by [facility]. Discussed and encouraged post hospitalization follow up. Patient is agreeable and requested CC assistance with scheduling f/u with his PCP.     An appointment was scheduled with Dr. Lenore Trotter at [facility] for April 7th at 9:30 am.     PLAN  Financial costs for the patient include none .  Patient given options and  choices for discharge yes .  Patient/family is agreeable to the plan?  Yes: patient declining home care  Patient anticipates discharging to home with family .     Patient anticipates needs for home equipment: No  Transportation/person available to transport on day of discharge  is family.  CTS left a message for patient's daughter, Amol.     Plan/Disposition: Home with family    Appointments:   A follow up appointment was scheduled with [facility] provider on Tuesday, April 7th at 9:30 am.     Care  (CTS) will continue to follow as needed.  Rob Mina RN BSN PHN CCM   Inpatient Care Coordination   [facility]   [phone]

## 2020-03-30 NOTE — PROGRESS NOTES
"PRIMARY DIAGNOSIS: CHEST PAIN/HYPOXIA   OUTPATIENT/OBSERVATION GOALS TO BE MET BEFORE DISCHARGE:  1. Ruled out acute coronary syndrome (negative or stable Troponin):  Yes  2. Pain Status: Nitroglycerin at needed   3. Appropriate provocative testing performed: NA  - Stress Test Procedure: NA  - Interpretation of cardiac rhythm per telemetry tech: SR 1 degree AV block     4. Cleared by Consultants (if applicable):No  5. Return to near baseline physical activity: Yes     Discharge Planner Nurse      Safe discharge environment identified: Yes  Barriers to discharge: Yes        Entered by: Lilli Zambrano 03/30/2020 1400     Please review provider order for any additional goals.   Nurse to notify provider when observation goals have been met and patient is ready for discharge.    Patient is alert and oriented x4. VS WNL. Lung sounds diminished in posterior lobes. Patient on RA after dialysis. Sats >92%.  Active bowel sounds in all 4 quadrants with LBM yesterday. Dialysis patient and patient states he makes no urine. Denies nausea. Denies pain. Trops went from 0.073 to 1.772 and PA updated. SL. Dialysis diet. 1 assist walker and gait belt. Left arm fistula dressing is dry and intact.     /59 (BP Location: Right arm)   Pulse 83   Temp 98.3  F (36.8  C) (Oral)   Resp 16   Ht 1.803 m (5' 11\")   Wt 89.7 kg (197 lb 11.2 oz)   SpO2 92%   BMI 27.57 kg/m      "

## 2020-03-30 NOTE — PROGRESS NOTES
[facility]  Hospitalist Progress Note  Soraya Conley PA-C 03/30/2020    Reason for Stay (Diagnosis): recurrent chest pain         Assessment and Plan:      Summary of Stay:  Donald Raza is a 70 year old male well know to our service with a significant cardiac history  (see below) along with ESRD on HD M/W/F, DM2, HTN, HLP chronic anemia, HIV on treatment, mod-severe aortic stenosis, bradycardia s/p PM, who was admitted on 3/30/2020 with recurrent chest pain and SOB requiring new O2 that is now resolved. BNP elevated and he needed to be dialyzed today. Clinically he felt better after HD and did not require O2 after and is not having CP. Plan was for discharge pending his last troponin but turned out to be 3x his baseline. In speaking with patient, he had taken himself off of Plavix for unknown reason (spoke with Dtr who states, he takes himself off of meds as he desires)  He is now on heparin gtt and awaiting cardiology input.     Cardiac Hx   -multiple NSTEMI with diagonal and LAD stenting (2015)   -repeat LAD/diagonal stenting due to in-stent re-stenosis (2016)   -repeat angiograms in 2017 and 3/2018 showing patent stents and non-flow limiting diagonal and side branch disease   - December 2019: admitted to [facility] for CP and had angiogram revealing a 99% lesion in rPDA and 85% DEANNA lesion: had VANITA to dRCA/DEANNA and VANITA to PDA; prior LAD and Diagonal stents were patent with up to 55% narrowing; placed on Plavix  - admitted to [facility] 3/2, 3/5 for chest pain felt related to anemia and 3/16 with normal Lexiscan with no inducible ischemia     Problem List:   1. Elevated Troponin in the setting of recurrent CPs, suggestive of NSTEMI  --currently clinically stable with no chest pain, s/p HD today so no concern for hypovolemia at this time   --Was suppose to be on 12 mos of Plavix/ASA but has not been taking the Plavix (no hx of GI bleed or intolerance as a reason for stopping), concern for potential  "re-instent stenosis.   --D/w Dr. Vargas, keep on heparin gtt for 48 hrs, load Plavix 150 mg today and 75 daily tomorrow, cont baby ASA.   --repeat ECHO in am to assess for RWMA, potentially may need to get repeat Cath.   --Follow trops, NPO after MN    2. CAD: complicated hx as above.  -- manage as above, cont Imdur, asa, Lipitor, coreg and Avapro    3.  End-stage kidney disease, on HD MWF. Slight fluid overload on admission, now s/p HD, feeling better. Appreciate Renal consult. Cont Dialysis diet.     4.  Hyperkalemia--should improve after HD. Repeat Labs in am     5.  Acute hypoxic respiratory failure.  Resolved. Improved after HD. O2 no longer needed.      6.  Hyperlipidemia.  Restart atorvastatin.     7.  Diabetes mellitus--well controlled .  A1c 5.1. NovoLog sliding scale as needed.     8.  HIV.  Resume home medications.     9.  Chronic anemia.  Hemoglobin appears to be near her recent baseline. Keep hgb around 8 as per Cards.      DVT Prophylaxis: Heparin gtt  Code Status: Full Code  Discharge Dispo: home   Estimated Disch Date / # of Days until Disch: unclear at least 48 hrs.         Interval History (Subjective):      Saw pt after HD. Feeling better. Pain is resolved. No CP, SOB. No n/v. Eating lunch.   No complaints.                   Physical Exam:      Last Vital Signs:  /52 (BP Location: Right arm)   Pulse 83   Temp 95.8  F (35.4  C) (Axillary)   Resp 16   Ht 1.803 m (5' 11\")   Wt 89.7 kg (197 lb 11.2 oz)   SpO2 96%   BMI 27.57 kg/m        Constitutional: Awake, alert, cooperative, no apparent distress   Respiratory: Clear to auscultation bilaterally, no crackles or wheezing   Cardiovascular: Regular rate and rhythm, normal S1 and S2, and 3/6 murmur noted   Abdomen: Normal bowel sounds, soft, non-distended, non-tender   Skin: No rashes, no cyanosis, dry to touch   Neuro: Alert and oriented x3, no weakness, numbness, memory loss   Extremities: No edema, normal range of motion   Other(s):      "   All other systems: Negative          Medications:      All current medications were reviewed with changes reflected in problem list.         Data:      All new lab and imaging data was reviewed.   Labs:       Lab Results   Component Value Date     03/30/2020     03/17/2020     03/16/2020    Lab Results   Component Value Date    CHLORIDE 97 03/30/2020    CHLORIDE 96 03/17/2020    CHLORIDE 100 03/16/2020    Lab Results   Component Value Date    BUN 85 03/30/2020    BUN 56 03/17/2020     03/16/2020      Lab Results   Component Value Date    POTASSIUM 5.4 03/30/2020    POTASSIUM 4.5 03/17/2020    POTASSIUM 5.0 03/16/2020    Lab Results   Component Value Date    CO2 25 03/30/2020    CO2 27 03/17/2020    CO2 23 03/16/2020    Lab Results   Component Value Date    CR 9.53 03/30/2020    CR 6.71 03/17/2020    CR 10.10 03/16/2020        Recent Labs   Lab 03/30/20  0156   WBC 6.4   HGB 8.7*   HCT 30.2*   MCV 94        Recent Labs   Lab 03/30/20  1502 03/30/20  0813 03/30/20  0156   TROPI 4.876* 1.772* 0.073*     Imaging:   Results for orders placed or performed during the hospital encounter of 03/30/20   XR Chest 2 Views    Narrative    EXAM: XR CHEST 2 VW  LOCATION: [facility]  DATE/TIME: 3/30/2020 2:21 AM    INDICATION: Chest pain. Evaluate for pneumonia.  COMPARISON: 12/05/2019.      Impression    IMPRESSION: Mild bilateral interstitial opacities may be due to edema or pneumonia. Stable heart size, upper normal. Right chest wall cardiac device.

## 2020-03-30 NOTE — PROGRESS NOTES
"[facility]AT     Date:  3/30/20  Admission Dx:  Chest Pain  Pulmonary History  COPD, CHF  Home Nebulizer/MDI Use:  DuoNeb, Albuterol  Home Oxygen:  None  Acuity Level (RCAT flow sheet):  3  Aerosol Therapy initiated:  DuoNeb QID, Albuterol MDI Q6 Prn    XR Chest 2 Views  IMPRESSION: Mild bilateral interstitial opacities may be due to edema or pneumonia. Stable heart size, upper normal. Right chest wall cardiac device. As read by Radiology. .      Pulmonary Hygiene initiated:  Deep breathe and cough    Volume Expansion initiated:  Incentive Spirometry    Current Oxygen Requirements:  2L  Current SpO2:  96%    Re-evaluation date:  4/2/20    Patient Education:  Will discuss risks and benefits of medications      See \"RT Assessments\" flow sheet for patient assessment scoring and Acuity Level Details.           "

## 2020-03-30 NOTE — ED NOTES
Bed: ED14  Expected date:   Expected time:   Means of arrival:   Comments:  [id number] 70 Chest pain, SOB

## 2020-03-30 NOTE — ED TRIAGE NOTES
Pt presents to ED via EMS due to chest pain/SOB that started at 9pm.  Hx of CHF, on dialysis, last run was Saturday.  Pt took 3 of his own nitroglycerin at home and EMS gave 2.  Pt reports this improved symptoms.  Very SOB with exertion.  ABCs intact

## 2020-03-30 NOTE — CONSULTS
Renal Medicine Inpatient Dialysis Note                                Donald Raza MRN# [id number]   Age: 70 year old YOB: 1950   Date of Admission: 3/30/2020 Hospital LOS: 0          Assessment/Plan:     Admitted with CP and SOB.  Concern for volume overload.  Frequent admissions under similar circumstances    Seen for ESRD management        1.  ESRD              -MWF [facility]              -AVF              -target weight 86 kg  2.  Anemia              -MIHAI              -transfuse as indicated  3.  Mineral Bone Disease  4.  CP              -multiple evaluations by cardiology  5.  HTN  6.  HIV +      Continue MWF schedule  Next 04/01/20      Interval History:     Dialysis run parameters reviewed with dialysis RN at patient bedside    3.25 hour run  2K  3.5 to 4 liter UF  MIHAI  Low dose heparin    Stable initial portion of run    Patient appears comfortable      ROS     GENERAL: NAD, No fever,chills  RESP:dyspnea on exertion  CV: NEGATIVE for chest pain, palpitations  EXT: no change edema  ROS otherwise negative    Dialysis Parameters:     Vitals were reviewed  Patient Vitals for the past 8 hrs:   BP Temp Temp src Heart Rate Resp SpO2 Height Weight   03/30/20 1130 122/65 -- -- 65 -- -- -- --   03/30/20 1115 126/66 -- -- 70 -- -- -- --   03/30/20 1100 110/53 -- -- 70 -- -- -- --   03/30/20 1045 108/59 -- -- 70 -- -- -- --   03/30/20 1030 108/56 -- -- 71 -- -- -- --   03/30/20 1015 103/42 -- -- 72 -- -- -- --   03/30/20 1000 104/52 -- -- 70 -- -- -- --   03/30/20 0945 109/55 97.9  F (36.6  C) Oral 72 20 98 % -- --   03/30/20 0936 -- 97.1  F (36.2  C) Oral -- -- -- -- --   03/30/20 0739 97/53 96.5  F (35.8  C) Oral 75 18 95 % -- --   03/30/20 0621 96/60 -- -- -- -- -- -- --   03/30/20 0615 111/62 -- -- -- -- 95 % -- --   03/30/20 0546 94/44 -- -- 76 -- -- -- --   03/30/20 0540 (!) 89/45 -- -- -- -- -- -- --   03/30/20 0536 96/49 -- -- -- 16 94 % -- --   03/30/20 0426 104/61 -- -- -- 20 -- -- --  "  03/30/20 0352 120/67 97.8  F (36.6  C) Oral 87 20 97 % 1.803 m (5' 11\") 89.7 kg (197 lb 11.2 oz)     No intake/output data recorded.    Vitals:    03/30/20 0352   Weight: 89.7 kg (197 lb 11.2 oz)       Current Weight: 89.7  Dry Weight: 86  Dialysis Temp: 36.5  C  Access Device: AVF  Access Site: left  Dialyzer: Revaclear  Dialysis Bath: 2  Sodium Profile: n  UF Goal: 4  Blood Flow Rate (mL/min): 400  Total Treatment Time (hrs): 3.25  Heparin: Low dose as required      EPO dose: y  Zemplar: n  IV Fe: n      Medications and Allergies:     Reviewed      Physical Exam:     Seen and examined during course of dialysis run    /65   Pulse 83   Temp 97.9  F (36.6  C) (Oral)   Resp 20   Ht 1.803 m (5' 11\")   Wt 89.7 kg (197 lb 11.2 oz)   SpO2 98%   BMI 27.57 kg/m      GENERAL: awake, alert, follows  HEENT: NC/AT, PERRLA, EOMI, non icteric, pharynx moist without lesion  RESP: basilar crackles  CV: RRR, normal S1 S2  ABDOMEN: S/NT, BS present  MS: no edema  EXT: access canulated without issue  NEURO: speech normal and cranial nerves 2-12 intact  PSYCH: affect normal/bright    Data:       Recent Labs   Lab 03/30/20  0156   *   POTASSIUM 5.4*   CHLORIDE 97   CO2 25   ANIONGAP 10   *   BUN 85*   CR 9.53*   GFRESTIMATED 5*   GFRESTBLACK 6*   PRABHA 9.6       Recent Labs   Lab 03/30/20  0156   HGB 8.7*         G David Fuller MD    [facility]phrology  [phone]          "

## 2020-03-30 NOTE — PROGRESS NOTES
DATE:  3/30/2020   TIME OF RECEIPT FROM LAB:  0915  LAB TEST:  Trop   LAB VALUE:  1.772  RESULTS GIVEN WITH READ-BACK TO (PROVIDER):  ADDIE Lira.   TIME LAB VALUE REPORTED TO PROVIDER:   0916

## 2020-03-30 NOTE — PROGRESS NOTES
"PRIMARY DIAGNOSIS: Elevated Troponin in the setting of recurrent CPs, suggestive of NSTEMI    OUTPATIENT/OBSERVATION GOALS TO BE MET BEFORE DISCHARGE:  1. Ruled out acute coronary syndrome (negative or stable Troponin):  Yes  2. Pain Status: Nitroglycerin at needed   3. Appropriate provocative testing performed: NA  - Stress Test Procedure: Echo in AM   - Interpretation of cardiac rhythm per telemetry tech: SR 1 degree and BBB.      4. Cleared by Consultants (if applicable):No  5. Return to near baseline physical activity: Yes     Discharge Planner Nurse      Safe discharge environment identified: Yes  Barriers to discharge: Yes        Entered by: Lilli Zambrano 03/30/2020 1800     Please review provider order for any additional goals.   Nurse to notify provider when observation goals have been met and patient is ready for discharge.     Patient is alert and oriented x4. VS WNL. Lung sounds diminished in posterior lobes. Patient on RA after dialysis. Sats >92%.  Active bowel sounds in all 4 quadrants with LBM yesterday. Dialysis patient and patient states he makes no urine. Denies nausea. Denies pain. Trops went from 1.772 to 4.876 and PA updated. Patient started on a Heparin gtt with a 4850 bolus and a continuous rate of 950 units/hr.  SL. Dialysis diet. 1 assist walker and gait belt. Left arm fistula dressing is dry and intact.     Plan: Repeat echo in am, heparin gtt for 48 hrs, Plavix 150 mg today and 75 mg tomorrow. Possible may need to get a repeat cath. NPO after midnight.     Blood pressure 114/52, pulse 83, temperature 95.8  F (35.4  C), temperature source Axillary, resp. rate 16, height 1.803 m (5' 11\"), weight 89.7 kg (197 lb 11.2 oz), SpO2 96 %.       "

## 2020-03-30 NOTE — PROGRESS NOTES
Potassium   Date Value Ref Range Status   03/30/2020 5.4 (H) 3.4 - 5.3 mmol/L Final     Hemoglobin   Date Value Ref Range Status   03/30/2020 8.7 (L) 13.3 - 17.7 g/dL Final     Creatinine   Date Value Ref Range Status   03/30/2020 9.53 (H) 0.66 - 1.25 mg/dL Final     Urea Nitrogen   Date Value Ref Range Status   03/30/2020 85 (H) 7 - 30 mg/dL Final     Sodium   Date Value Ref Range Status   03/30/2020 132 (L) 133 - 144 mmol/L Final     INR   Date Value Ref Range Status   03/16/2020 1.08 0.86 - 1.14 Final       DIALYSIS PROCEDURE NOTE  Hepatitis status of previous patient on machine log was checked and verified ok to use with this patients hepatitis status.  Patient dialyzed for 3.25 hrs. on a 2 K bath with a net fluid removal of  3.5L.  A BFR of 400 ml/min was obtained via a LUAF using 15gauge needles.    The treatment plan was dicussed with Dr. Armstrong during the treatment.  Total heparin received during the treatment: 0 units.   Needle cannulation sites held x 20 min, pressure dressings applied after hemostasis.  Meds  given: none Complications: none    Procedure/ESRD/access care/potassium/fluid restriction education provided orally to patient, patient verbalizedunderstanding  ICEBOAT? Timeout performed pre-treatment  I: Patient was identified using 2 identifiers  C: Consent obtained/verified current before treatment  E: Equipment preventative maintenance is current and dialysis delivery system OK to use  B: Hepatitis B Surface Antigen: negative; Draw Date: 10/29/19      Hepatitis B Surface Antibody: immune; Draw Date: 11/21/19  O: Dialysis orders present and complete prior to treatment  A: Vascular access verified and assessed prior to treatment  T: Treatment was performed at a clinically appropriate time  ?: Patient was allowed to ask questions and address concerns prior to treatment  See flowsheet in EPIC for further details and post assessment.  Machine water alarm in place and functioning. Transducer pods  intact and checked every 15min.  Pt returned via w/c   Report received from: Lilli Zambrano RN  Report given to: Lilli Zambrano RN  Chlorine/Chloramine water system checked every 4 hours.  Outpatient Dialysis at [facility]

## 2020-03-30 NOTE — PLAN OF CARE
PRIMARY DIAGNOSIS: CHEST PAIN  OUTPATIENT/OBSERVATION GOALS TO BE MET BEFORE DISCHARGE:  1. Ruled out acute coronary syndrome (negative or stable Troponin):  No  2. Pain Status: Improved-controlled with oral pain medications.  3. Appropriate provocative testing performed: N/A  - Stress Test Procedure: NA  - Interpretation of cardiac rhythm per telemetry tech: SR, 1 degree AV Block    4. Cleared by Consultants (if applicable):No  5. Return to near baseline physical activity: No  Discharge Planner Nurse   Safe discharge environment identified: Yes  Barriers to discharge: Yes       Entered by: Roscoe Carnes 03/30/2020 4:39 AM     Please review provider order for any additional goals.   Nurse to notify provider when observation goals have been met and patient is ready for discharge.    Vitals are Temp: 97.8  F (36.6  C) Temp src: Oral BP: 104/61(post nitro) Pulse: 83 Heart Rate: 87 Resp: 20 SpO2: 97 %.  Patient is Alert and Oriented x4. They are 1 Assist with Gait Belt and Walker.  Pt is a Dialysis diet.  They are complaining of pain in their chest, pressure, SL nitroglycerin given with minimal relief.  Patient is Saline locked.      0535: 8/10 chest pressure, radiating to arm and shoulder, SL nitroglycerin given, pt states same chest pain as previously. Improved to pain free post SL nitroglycerin. Will cont to monitor

## 2020-03-30 NOTE — PLAN OF CARE
ROOM # 226    Living Situation (if not independent, order SW consult): Home with Wife  Facility name:  : Wife    Activity level at baseline: Independent with walker  Activity level on admit: Assist of 1 with WW and gait belt      Patient registered to observation; given Patient Bill of Rights; given the opportunity to ask questions about observation status and their plan of care.  Patient has been oriented to the observation room, bathroom and call light is in place.    Discussed discharge goals and expectations with patient/family.

## 2020-03-30 NOTE — ED PROVIDER NOTES
History     Chief Complaint:  Shortness of Breath      HPI   Donald Raza is a 70 year old male, with a complex history including HIV/AIDS, COPD, colon cancer, CHF, ESRD on dialysis (MWF), s/p pacemaker, and s/p stents, who presents to the ED for evaluation of shortness of breath and chest pain. The patient reports he developed shortness of breath with associated chest pain at 9:00PM yesterday. Chest pain feels bother pressure-like and sharp, typical of usual chest pain.  His shortness of breath particularly worsens with exertion. His chest pain has improved. He took 3 Nitro. He is not on home supplemental O2. The patient notes he was last dialyzed 3 days ago. He is due for dialysis today. The patient denies any diaphoresis, nausea, vomiting, abdominal pain, bowel changes, or other symptoms/complaints. Of note, the patient was provided 2 Nitro per EMS.     Allergies:  Calcium: chest pain, back pain, & skin sensitivity   Contrast dye: nephropathy   Diagnostic x-ray materials: renal failure   Lisinopril: cough   Sulfa drugs: rash        Medications:    Ziagen  Albuterol inhaler  Aspirin 81mg  Dialyvite  Nepro   Atorvastatin  Coreg  Sensipar  Clonazepam  Clonidine  Dapsone  Prezista   Tivicay   Epoetin  Gabapentin  Humalog  Duoneb  Avapro  Magnesium   Melatonin   Remeron  Nitrostat  Protonix  Miralax  Norvir  Velphoro   Incruse Ellipta inhaler   Doxercalciferol    Past Medical History:    Type 2 DM  ASCVD  Umbilical hernia   Peripheral polyneuropathy   Pulmonary HTN  CAD  Aortic stenosis  Mitral stenosis   Anemia  CHF  ESRD, on dialysis (MWF)  Depression  Syphilis   Gout  HIV/AIDS  Pneumonia   Huang disease   Unstable angina   Sepsis   ACS  Cataract  MI  Pulmonary edema   Colon cancer  COPD  Dilated aortic root  Diverticulitis, sigmoid colon   TIA  HLD  SCC  HTN    Past Surgical History:    PTCA  Appendectomy   Cholecystectomy  Hemicolectomy   Colostomy   Herniorrhaphy   Pacemaker placement   Bilateral cataract  "removal   Left heart catheterization   Heart cath, angioplasty   Left dialysis fistulogram   Coronary stent placement x2    Family History:    Stroke, prostate cancer, stroke, cataract, aorta dilated, heart disease, s/p CABG: brother   Kidney disease, HTN: sister   Diabetes, HTN, cardiovascular disease, varicose veins, cataracts, glaucoma: mother      Social History:  Smoking status: Former smoker, Quit 8/2/2007  Substance use: No  Alcohol use: No  Marital Status:   [2]     Review of Systems   Constitutional: Negative for diaphoresis.   Respiratory: Positive for shortness of breath.    Cardiovascular: Positive for chest pain.   Gastrointestinal: Negative for abdominal pain, nausea and vomiting.   All other systems reviewed and are negative.    Physical Exam     Patient Vitals for the past 24 hrs:   BP Temp Temp src Pulse Heart Rate Resp SpO2 Height Weight   03/30/20 0352 120/67 97.8  F (36.6  C) Oral -- 87 20 97 % 1.803 m (5' 11\") 89.7 kg (197 lb 11.2 oz)   03/30/20 0315 136/86 -- -- 83 82 -- 93 % -- --   03/30/20 0300 129/70 -- -- 85 86 -- 98 % -- --   03/30/20 0245 (!) 137/92 -- -- 84 84 -- (!) 86 % -- --   03/30/20 0236 -- -- -- -- 88 -- (!) 89 % -- --   03/30/20 0150 -- -- -- -- 90 -- 96 % -- --   03/30/20 0142 (!) 160/96 -- -- 94 95 -- -- -- --     Physical Exam  I have reviewed the triage vital signs    Constitutional: Appears stated age  Eyes: No discharge, symmetrical lids  ENT: Moist mucous membranes, no ear discharge  Neck: Full range of motion  Respiratory: CTAB, no wheezes  Cardiovascular: Regular rate and rhythm, LUE fistula in place with palpable thrill  Chest: Equal rise  Gastrointestinal: Soft. Nondistended. NTTP. No rebound or guarding  Musculoskeletal: No gross deformities.   Skin: Warm and well perfused. No visible rash.  Neurologic: Moves all extremities, speech fluent without dysarthria  Psychiatric: Appropriate affect, alert and interactive    Emergency Department Course     ECG " (1:43:39):  Rate 94 bpm. NC interval 216. QRS duration 162. QT/QTc 404/505. P-R-T axes 34 5 143. Sinus rhythm with 1st degree AV block. Left bundle branch block. Abnormal ECG. Significant change compared to EKG dated 3/16/20. Interpreted at 0151 by VoPelon MD.    Imaging:  Radiographic findings were communicated with the patient who voiced understanding of the findings.    XR Chest 2 Views  IMPRESSION: Mild bilateral interstitial opacities may be due to edema or pneumonia. Stable heart size, upper normal. Right chest wall cardiac device. As read by Radiology.     Laboratory:  Laboratory findings were communicated with the patient who voiced understanding of the findings.    Troponin I (0156): 0.073(H)  Nt Pro BNP: 146,702(H)    CBC: HGB 8.7(L), o/w WNL (WBC 6.4, )  BMP: (L), Potassium 5.4(H), Glucose 284(H), BUN 85(H), GFR estimate 5(L), Creatinine 9.53(H), o/w WNL     Procalcitonin: In process     Emergency Department Course:  0135: Patient arrived by EMS.     Past medical records, nursing notes, and vitals reviewed.    0143: EKG obtained in the ED, see results above.     0156: IV was inserted and blood was drawn for laboratory testing, results above.    The patient was sent for a chest x-ray while in the emergency department, results above.     0240: I performed an exam of the patient as documented above.     0252: I spoke to Dr. Tolliver of the hospitalist service who accepts the patient for admission.     Findings and plan explained to the patient who consents to admission. Discussed the patient with Dr. Tolliver, who will admit the patient to an obs bed for further monitoring, evaluation, and treatment.    I personally reviewed the laboratory results with the patient and answered all related questions prior to admission.     Impression & Plan      Medical Decision Makin-year-old male with complex past medical history including end-stage renal disease, HIV, CAD, presenting with chest  pain and shortness of breath.  Differential diagnosis includes CHF exacerbation, pulmonary edema, hyperkalemia, volume overload.  Patient found to be hypoxic on room air to 88%.  Requiring 2 L nasal cannula oxygen.  Does not have a baseline oxygen requirement.  Potassium found to be elevated at 5.4.  EKG does not demonstrate any signs of ischemia or hyperkalemia.  Troponin is mildly elevated at 0.07, however difficult to interpret given end-stage renal disease as on chart review patient frequently has elevated troponins.  Given volume overload and oxygen requirement, plan to admit to the hospital for emergent dialysis.    Diagnosis:    ICD-10-CM   1. Acute pulmonary edema (H)  J81.0   2. ESRD (end stage renal disease) on dialysis (H)  N18.6    Z99.2   3. Hyperkalemia  E87.5   4. Elevated troponin  R79.89   5. Acute respiratory failure with hypoxia (H)  J96.01     Disposition: Patient admitted to an obs bed by Dr. Sharee Ny  3/30/2020   [facility] EMERGENCY DEPARTMENT    Scribe Disclosure:  Kylee BARRERA, am serving as a scribe at 2:40 AM on 3/30/2020 to document services personally performed by Pelon James MD based on my observations and the provider's statements to me.        Pelon James MD  03/30/20 [id number]

## 2020-03-30 NOTE — DISCHARGE INSTRUCTIONS
Your hospital follow up appointment has been scheduled for you with Dr. Lenore Rand at [facility] for  Tuesday, April 7th at 9:30am. Please bring your hospital discharge instructions, a photo ID, and insurance information with you to your appointment. Please call the clinic at [phone] if you need to reschedule.

## 2020-03-30 NOTE — ED NOTES
[facility]  ED Nurse Handoff Report    Donald Raza is a 70 year old male   ED Chief complaint: Shortness of Breath  . ED Diagnosis:   Final diagnoses:   Acute pulmonary edema (H)   ESRD (end stage renal disease) on dialysis (H)   Hyperkalemia   Elevated troponin   Acute respiratory failure with hypoxia (H)     Allergies:   Allergies   Allergen Reactions     Calcium Acetate Other (See Comments)     Other reaction(s): Other, see comments  Pain in chest and back  Pain in chest area (sensitive skin)      Contrast Dye Other (See Comments)     Contrast nephropathy     Diagnostic X-Ray Materials Other (See Comments)     PN: renal failure     Lisinopril      Sulfa Drugs        Code Status: not on file  Activity level - Baseline/Home:  Independent. Activity Level - Current:   Assist X 1. Lift room needed: No. Bariatric: No   Needed: No   Isolation: No. Infection: AIDS.     Vital Signs:   Vitals:    03/30/20 0142 03/30/20 0150 03/30/20 0236   BP: (!) 160/96     Pulse: 94     SpO2:  96% (!) 89%       Cardiac Rhythm:  ,      Pain level:    Patient confused: No. Patient Falls Risk: Yes.   Elimination Status: has not voided - does not make urine   Patient Report - Initial Complaint: SOB/CP. Focused Assessment:  Donald Raza is a 70 year old male, with a complex history including HIV/AIDS, COPD, colon cancer, CHF, ESRD on dialysis (MWF), s/p pacemaker, and s/p stents, who presents to the ED for evaluation of shortness of breath. The patient reports he developed shortness of breath with associated chest pain at 9:00PM yesterday. His shortness of breath particularly worsens with exertion. His chest pain has improved. He took 3 Nitro. He is not on home supplemental O2. The patient notes he was last dialyzed 3 days ago. He is due for dialysis today. The patient denies any diaphoresis, nausea, vomiting, abdominal pain, urinary symptoms, bowel changes, or other symptoms/complaints. Of note, the patient was  provided 2 Nitro per EMS.   *Hypoxic on room air 88%, on 2 L  *Midsternal CP  Tests Performed:   Labs Ordered and Resulted from Time of ED Arrival Up to the Time of Departure from the ED   CBC WITH PLATELETS DIFFERENTIAL - Abnormal; Notable for the following components:       Result Value    RBC Count 3.22 (*)     Hemoglobin 8.7 (*)     Hematocrit 30.2 (*)     MCHC 28.8 (*)     RDW 18.4 (*)     All other components within normal limits   BASIC METABOLIC PANEL - Abnormal; Notable for the following components:    Sodium 132 (*)     Potassium 5.4 (*)     Glucose 284 (*)     Urea Nitrogen 85 (*)     Creatinine 9.53 (*)     GFR Estimate 5 (*)     GFR Estimate If Black 6 (*)     All other components within normal limits   TROPONIN I - Abnormal; Notable for the following components:    Troponin I ES 0.073 (*)     All other components within normal limits   NT PROBNP INPATIENT     XR Chest 2 Views   Preliminary Result   IMPRESSION: Mild bilateral interstitial opacities may be due to edema or pneumonia. Stable heart size, upper normal. Right chest wall cardiac device.          Treatments provided: monitoring, ultrasound guided IV  Family Comments: none present  OBS brochure/video discussed/provided to patient:  yes  ED Medications: Medications - No data to display  Drips infusing:  No  For the majority of the shift, the patient's behavior Green. Interventions performed were rounding.    Sepsis treatment initiated: No       ED Nurse Name/Phone Number: Rosalba Lala RN,   2:53 AM    RECEIVING UNIT ED HANDOFF REVIEW    Above ED Nurse Handoff Report was reviewed: Yes  Reviewed by: Roscoe Carnes RN on March 30, 2020 at 3:34 AM

## 2020-03-30 NOTE — CONSULTS
"Consult Date:  03/30/2020      CARDIOLOGY CONSULTATION      REQUESTING PHYSICIAN:  Sukh Owen M.D.      REASON FOR REFERRAL:  HISTORY OF PRESENT ILLNESS:  Mr. Raza is a 70-year-old gentleman with a very complex medical history.  Pertinent history is multivessel coronary disease with multiple previous revascularizations.  Most recently, at [facility] in 12/2019 he underwent a percutaneous coronary intervention to his right coronary artery, the DEANNA and the PDA with drug-eluting stents.  He has moderate aortic stenosis, type 2 diabetes, end-stage renal disease on dialysis Monday, Wednesday, Friday and a history of HIV with AIDS.  He has had colon cancer.  He has anemia of chronic disease with recent blood transfusion in the last 6 months due to hemoglobin less than 7 without evidence of acute blood loss.  He presented to the emergency room today with symptoms of shortness of breath and chest pain.  In talking with him, I noticed in his medication list, Plavix is not listed.  He does not recall when he stopped taking Plavix, but he does admit that he is no longer taking Plavix.  I reviewed the chart from [facility], the discharge summary from 12/2019 clearly stating Plavix should be given for 12 months post-PCI with drug-eluting stents.  He refills his own medications from [facility] and he states \"I take them very carefully.\"  He denies being noncompliant with his medications with the exception again of Plavix because he did not realize he was supposed to continue taking it.  He is very comfortable now.  He feels well and without chest pain symptoms or difficulty breathing.  He has dialyzed today.      PAST MEDICAL HISTORY:  I am going to refer to the  and , it is extensive, I listed the pertinents.        MEDICATIONS:  Please see H and P.      ALLERGIES:  HE HAS SEVERAL ALLERGIES, PLEASE SEE EPIC.      FAMILY HISTORY:  Positive for coronary disease.      SOCIAL HISTORY:  He is a remote tobacco user, quitting " in 2007.  He is  and lives independently.      REVIEW OF SYSTEMS:  He has chest pain and shortness of breath as described above.  No history of syncope, no palpitations, no difficulty with peripheral edema.      PHYSICAL EXAMINATION:   VITAL SIGNS:  His blood pressure has been ranging anywhere between  systolic over 45-96 diastolic.  Heart rates in the 60s, respiratory rate 16.  He is oxygenating at least 92% on room air.   GENERAL:  He is an elderly male, no apparent distress, eupneic on exam with conversation.   HEENT:  Head is  atraumatic and normocephalic.  Pupils are equal and small.  He wears eyeglasses.  Dentition is poor.   NECK:  Supple.  I cannot appreciate for jugular venous distention.   CARDIOVASCULAR:  Tones are regular with a grade 2 systolic ejection murmur.   LUNGS:  Diminished but clear.   ABDOMEN:  Soft.   EXTREMITIES:  No peripheral edema.   SKIN:  Without jaundice.   NEUROLOGIC:  He seems alert and oriented.  There are no gross focal neurologic abnormalities.      LABORATORY DATA:  Electrocardiogram on admission demonstrates a sinus rhythm with a left bundle branch block, this is chronic.  Lab values, this is predialysis  admission, sodium was 132, potassium 5.4, creatinine 9.53.  NT proBNP was markedly elevated at 134,173.  Troponin levels 0.073, subsequent 4.876.  White blood cell count 6.4, hemoglobin 8.7, platelets 154.  Chest x-ray, this again was predialysis, mild bilateral interstitial opacities consistent with edema.      ASSESSMENT AND PLAN:  This is a 70-year-old male, very extensive medical history, pertinent includes previous coronary disease with multivessel stenting, most recently done in 12/2019 at [facility] with drug-eluting stents to his DEANNA and PDA of his right coronary artery.  He stopped taking his Plavix several months ago and he was recommended to take 12 months of an uninterrupted dual antiplatelet therapy due to recent drug-eluting stent placement.   He has evidence of elevated troponin suggesting possible subacute thrombosis of his stents.  Will recommend that we get an echocardiogram and assess for regional wall motion abnormality in the inferior wall.  He is pain-free and asymptomatic currently, on a heparin drip.  I would recommend loading him back to Plavix 150 mg today followed by 75 daily.  Continue heparin for 48 hours.  If he has a significant regional wall motion abnormality on echocardiogram, may consider a repeat angiogram.  Recommend to continue all of his other cardiac medications.  Please feel free to contact me with any questions you have in regards to his care.         LAURIE PHILLIPS DO             D: 2020   T: 2020   MT: MANNY      Name:     GREGORIO BRUSH   MRN:      [id number]        Account:       [id number]   :      1950           Consult Date:  2020      Document: [id number]       cc: Sukh Owen MD

## 2020-03-30 NOTE — PHARMACY-ADMISSION MEDICATION HISTORY
Admission medication history interview status for this patient is complete. See [facility] admission navigator for allergy information, prior to admission medications and immunization status.     Medication history interview source(s):Patient via phone  Medication history resources (including written lists, pill bottles, clinic record):EPIC list, Sure Scripts  Primary pharmacy:[facility]    Changes made to PTA medication list:  Added: zofran, plavix, novolog per sure scripts  Deleted: mucinex 1200mg bid prn, humalog 2 units with meals, humalog ssi  Changed: velphoro from 1000mg tid to 500mg tid per pt and sure scripts fill history    Actions taken by pharmacist (provider contacted, etc):None     Additional medication history information:None    Medication reconciliation/reorder completed by provider prior to medication history? Yes, sticky note left for MD      For patients on insulin therapy:  Lantus/levemir/NPH/Mix 70/30 dose: /N (see below)  Sliding scale Novolog Y (see below)  If yes, do you have baseline novolog pre-meal dose: No  Patients eat three meals a day: N  Pt only check BG once daily.  Any barriers to therapy: cost of medications/comfortable with giving injections (if applicable)/comfortable and confident with current diabetes regimen.      Prior to Admission medications    Medication Sig Last Dose Taking? Auth Provider   abacavir (ZIAGEN) 300 MG tablet Take 600 mg by mouth every evening  3/29/2020 at pm Yes Abstract, Provider   albuterol (PROAIR HFA/PROVENTIL HFA/VENTOLIN HFA) 108 (90 BASE) MCG/ACT Inhaler Inhale 2 puffs into the lungs every 6 hours as needed for shortness of breath / dyspnea or wheezing no recent usage Yes Nelson Worthy MD   aspirin 81 MG EC tablet Take 81 mg by mouth every evening 3/29/2020 at pm Yes Unknown, Entered By History   atorvastatin (LIPITOR) 40 MG tablet Take 40 mg by mouth At Bedtime 3/29/2020 at pm Yes Unknown, Entered By History   carvedilol (COREG) 12.5 MG tablet  Take 1 tablet (12.5 mg) by mouth 2 times daily (with meals) 3/29/2020 at pm Yes Jovi Hayes MD   chlorhexidine (CHLORHEXIDINE) 0.12 % solution Rinse and gargle 15 ml by mouth twice a day as directed. 3/29/2020 at Unknown time Yes Abstract, Provider   CLONAZEPAM PO Take 0.5 mg by mouth nightly as needed for anxiety (restless legs) Past Week at Unknown time Yes Unknown, Entered By History   cloNIDine (CATAPRES) 0.1 MG tablet Take 0.1 mg by mouth daily as needed (for high blood pressure) If sbp > 180 no recent usage Yes Unknown, Entered By History   clopidogrel (PLAVIX) 75 MG tablet Take 75 mg by mouth daily 3/29/2020 at Unknown time Yes Unknown, Entered By History   dapsone 100 MG tablet Take 100 mg by mouth At Bedtime  3/29/2020 at pm Yes Reported, Patient   Darunavir Ethanolate (PREZISTA PO) Take 800 mg by mouth At Bedtime. 3/29/2020 at pm Yes Reported, Patient   dolutegravir (TIVICAY) 50 MG tablet Take 50 mg by mouth At Bedtime 3/29/2020 at pm Yes Unknown, Entered By History   gabapentin (NEURONTIN) 300 MG capsule Take 300 mg by mouth as needed May take after HD. Past Week at Unknown time Yes Unknown, Entered By History   gabapentin (NEURONTIN) 300 MG capsule Take 300 mg by mouth 2 times daily  3/29/2020 at pm Yes Unknown, Entered By History   imiquimod (ALDARA) 5 % cream Apply topically as needed 3/29/2020 at Unknown time Yes Reported, Patient   insulin aspart (NOVOLOG FLEXPEN) 100 UNIT/ML pen Inject Subcutaneous 3 times daily (with meals) Take 2 units for every 50 BG above 150 3/29/2020 at am-1 or 2 units Yes Unknown, Entered By History   ipratropium - albuterol 0.5 mg/2.5 mg/3 mL (DUONEB) 0.5-2.5 (3) MG/3ML neb solution Take 1 vial (3 mLs) by nebulization every 6 hours as needed for shortness of breath / dyspnea or wheezing no recent usage Yes Catrachito Rosado DO   irbesartan (AVAPRO) 300 MG tablet Take 1 tablet (300 mg) by mouth At Bedtime 3/29/2020 at hs Yes Clemencia Pal MD   Isosorbide  "Mononitrate  MG TB24 Take 1 tablet (120 mg) by mouth every evening 3/29/2020 at hs Yes Yuki Hinojosa APRN CNP   ketoconazole (NIZORAL) 2 % cream Apply topically daily Between toes for fungal infection 3/29/2020 at Unknown time Yes Reported, Patient   MAGNESIUM OXIDE PO Take 400 mg by mouth At Bedtime 3/29/2020 at hs Yes Unknown, Entered By History   melatonin 5 MG tablet Take 5 mg by mouth nightly as needed  Past Week at Unknown time Yes Reported, Patient   mirtazapine (REMERON) 15 MG tablet Take 15 mg by mouth At Bedtime 3/29/2020 at hs Yes Reported, Patient   nitroglycerin (NITROSTAT) 0.4 MG SL tablet One tablet under the tongue every 5 minutes if needed for chest pain. May repeat every 5 minutes for a maximum of 3 doses in 15 minutes\" 3/29/2020 at Unknown time Yes Lay Paz MD   ondansetron (ZOFRAN-ODT) 4 MG ODT tab Take 4 mg by mouth every 8 hours as needed for nausea no recent usage Yes Unknown, Entered By History   pantoprazole (PROTONIX) 40 MG EC tablet Take 40 mg by mouth daily 3/29/2020 at Unknown time Yes Unknown, Entered By History   polyethylene glycol (MIRALAX/GLYCOLAX) packet Take 1 packet by mouth daily as needed for constipation no recent usage Yes Reported, Patient   ritonavir (NORVIR) 100 MG capsule Take 1 capsule by mouth At Bedtime  3/29/2020 at hs Yes Reported, Patient   sucroferric oxyhydroxide (VELPHORO) 500 MG CHEW chewable tablet Take 500 mg by mouth 3 times daily (with meals)  3/29/2020 at Unknown time Yes Unknown, Entered By History   umeclidinium (INCRUSE ELLIPTA) 62.5 MCG/INH inhaler Inhale 1 puff into the lungs daily 3/29/2020 at Unknown time Yes Unknown, Entered By History   cinacalcet (SENSIPAR) 30 MG tablet Take 30 mg by mouth three times a week On dialysis days 3/27/2020  Unknown, Entered By History   DOXERCALCIFEROL IV (given at dialysis)_ 3/27/2020  Reported, Patient   epoetin brittni (EPOGEN,PROCRIT) 12857 UNIT/ML injection WITH DIALYSIS; 3/27/2020  Unknown, Entered By " History   Nutritional Supplements (NEPRO PO) Take 1 Container by mouth 3 times daily 2-3 times daily   Unknown, Entered By History

## 2020-03-30 NOTE — H&P
[facility]    History and Physical - Hospitalist Service       Date of Admission:  3/30/2020    Assessment & Plan   Donald Raza is a 70 year old male admitted on 3/30/2020. He has a past medical history significant for HIV, coronary artery disease, end-stage kidney disease on hemodialysis Monday/Wednesday/Friday, hypertension, diabetes mellitus type 2, hyperlipidemia, chronic anemia, aortic stenosis, and previous pacemaker placement.  He has had multiple recent hospital visits for chest pain.  Return to the emergency room today due to recurrent chest pain.    1.  Chest pain.  Consistent with previous episodes.  Did have a recent cardiac stress test with no evidence of inducible ischemia.  Does have a chronic mild troponin elevation.  He does have a slight troponin elevation today which is not elevated above recent levels.  Suspect that his pain is due to fluid overload.  Will monitor on telemetry.  Consult nephrology.  Check 1 more troponin level.  Restart aspirin, atorvastatin, isosorbide mononitrate, and carvedilol.    2.  End-stage kidney disease.  Does appear to have mild fluid overload on chest x-ray.  Nephrology consult.  Would normally have dialysis in a few hours.  Dialysis diet.    3.  Hyperkalemia.  Mild.  Nephrology consult.  Likely needs dialysis in a few hours.    4.  Acute hypoxic respiratory failure.  Mild.  Likely due to fluid overload.  Likely needs dialysis.  Also check a procalcitonin level.  Supplemental oxygen as needed.    5.  Hyperlipidemia.  Restart atorvastatin.    6.  Diabetes mellitus.  Start NovoLog sliding scale.    7.  HIV.  Restart home medications once verified by pharmacy.    8.  Chronic anemia.  Hemoglobin appears to be near her recent baseline.    9.  Hyponatremia.  Mild.  Likely from fluid overload.  Expected to have dialysis today.  Nephrology consult.     Diet: Dialysis diet.  DVT Prophylaxis: Ambulate every shift  Christie Catheter: not present  Code Status: Full  code.    Disposition Plan   Expected discharge: Today, recommended to prior living arrangement     Marquez Tolliver,   [facility]    ______________________________________________________________________    Chief Complaint   Chest pain and shortness of breath.    History is obtained from the patient    History of Present Illness   Donald Raza is a 70 year old male who has a past medical history significant for HIV, coronary artery disease, end-stage kidney disease on hemodialysis Monday/Wednesday/Friday, hypertension, diabetes mellitus type 2, hyperlipidemia, chronic anemia, aortic stenosis, and previous pacemaker placement.  He has had multiple recent hospitalizations for chest pain.  Most recent prior hospital stay began on 3/16.  During that stay, he did have cardiac stress testing performed.  He did have what he describes as a normal dialysis run on Friday.  Had been feeling okay after dialysis and on Saturday.  Developed chest pain the evening of 3/29.  Pain located in the center of his chest.  States that the pain feels very similar to previous episodes of pain.  Pain did not radiate.  He did take 3 nitroglycerin at home.  Nitroglycerin was helping the pain for approximately 15 minutes at a time.  When pain continued to come back despite nitroglycerin, he called EMS to bring him to emergency room.  He is also been feeling short of breath beginning about the same time last night.  He has not had a cough.  Has not felt any fevers or chills.  Has not been around anyone else who is ill.  The only time that he has left his houseduring the past 2 weeks has been for dialysis per his report.  Chest pain has currently resolved with medication given in emergency room.  Breathing feels better now the pain has resolved.  Still a little short of breath.  No other acute complaints.    Review of Systems    The 10 point Review of Systems is negative other than noted in the HPI     Past Medical History     I have reviewed this patient's medical history and updated it with pertinent information if needed.   Past Medical History:   Diagnosis Date     Allergic rhinitis      Anemia due to chronic kidney disease 10/21/2011     CAD S/P percutaneous coronary angioplasty 06/15/2015     Cataract      CKD (chronic kidney disease)      Colon cancer      COPD      Depression      Dilated aortic root 05/06/2016     Diverticulitis      ESRD (end stage renal disease) on dialysis 05/06/2016     Gout      Human immunodeficiency virus (HIV) disease      Hx of squamous cell carcinoma 03/23/2007     Hypertension 2010     Impotence of organic origin      Increased prostate specific antigen (PSA) velocity 08/08/2016    Awaiting bx on blood thinner     Mixed hyperlipidemia      Pulmonary HTN     Mod     TIA (transient ischemic attack) 5/2016     Type 2 diabetes mellitus age 50       Past Surgical History   I have reviewed this patient's surgical history and updated it with pertinent information if needed.  Past Surgical History:   Procedure Laterality Date     ANGIOGRAM  03-04-16    No culprit lesions, stents widely patent      ANGIOGRAM  05-06-16    Cutting balloon ptca=Diag     APPENDECTOMY  2000     CHOLECYSTECTOMY, LAPOROSCOPIC       COLON SURGERY       COLOSTOMY  09/30/1999    Temporary for diverticulitis     EP PACEMAKER N/A 5/2/2019    Procedure: EP Pacemaker;  Surgeon: Angelique Perera MD;  Location:  HEART CARDIAC CATH LAB     HC LEFT HEART CATHETERIZATION  03/12/2018    No significant change  Elevated LVEDp  LVEF 30% No PCI     HEART CATH, ANGIOPLASTY  08-18-16    LAD PCI. Stented with a 3.0 x 8 mm Xience Alpine stent.     IR DIALYSIS FISTULOGRAM LEFT  1/25/2019     STENT, CORONARY, - VANITA=Diag, PTCA=LAD  12/2015     STENT, CORONARY, - VANITA=LAD  06/2015       Social History   I have reviewed this patient's social history and updated it with pertinent information if needed.  Social History     Tobacco Use     Smoking  status: Former Smoker     Packs/day: 2.00     Years: 41.00     Pack years: 82.00     Types: Cigarettes     Last attempt to quit:      Years since quittin.2     Smokeless tobacco: Never Used   Substance Use Topics     Alcohol use: No     Alcohol/week: 0.0 standard drinks     Drug use: No       Family History   I have reviewed this patient's family history and updated it with pertinent information if needed.   Family History   Problem Relation Age of Onset     Heart Disease Brother 40        CABG     Kidney Disease Sister      Hypertension Sister      Heart Disease Brother         Dilated aorta       Prior to Admission Medications   Prior to Admission Medications   Prescriptions Last Dose Informant Patient Reported? Taking?   CLONAZEPAM PO  Self Yes No   Sig: Take 0.5 mg by mouth nightly as needed for anxiety (restless legs)   DOXERCALCIFEROL IV  Self Yes No   Sig: (given at dialysis)_   Darunavir Ethanolate (PREZISTA PO)  Self Yes No   Sig: Take 800 mg by mouth At Bedtime.   Isosorbide Mononitrate  MG TB24  Self No No   Sig: Take 1 tablet (120 mg) by mouth every evening   MAGNESIUM OXIDE PO  Self Yes No   Sig: Take 400 mg by mouth At Bedtime   Nutritional Supplements (NEPRO PO)  Self Yes No   Sig: Take 1 Container by mouth 3 times daily 2-3 times daily   abacavir (ZIAGEN) 300 MG tablet  Self Yes No   Sig: Take 600 mg by mouth every evening    albuterol (PROAIR HFA/PROVENTIL HFA/VENTOLIN HFA) 108 (90 BASE) MCG/ACT Inhaler  Self No No   Sig: Inhale 2 puffs into the lungs every 6 hours as needed for shortness of breath / dyspnea or wheezing   aspirin 81 MG EC tablet   Yes No   Sig: Take 81 mg by mouth every evening   atorvastatin (LIPITOR) 40 MG tablet  Self Yes No   Sig: Take 40 mg by mouth At Bedtime   carvedilol (COREG) 12.5 MG tablet   No No   Sig: Take 1 tablet (12.5 mg) by mouth 2 times daily (with meals)   chlorhexidine (CHLORHEXIDINE) 0.12 % solution  Self Yes No   Sig: Rinse and gargle 15 ml by  mouth twice a day as directed.   cinacalcet (SENSIPAR) 30 MG tablet   Yes No   Sig: Take 30 mg by mouth three times a week On dialysis days   cloNIDine (CATAPRES) 0.1 MG tablet   Yes No   Sig: Take 0.1 mg by mouth daily as needed (for high blood pressure) If sbp > 180   dapsone 100 MG tablet  Self Yes No   Sig: Take 100 mg by mouth At Bedtime    dolutegravir (TIVICAY) 50 MG tablet  Self Yes No   Sig: Take 50 mg by mouth At Bedtime   epoetin brittni (EPOGEN,PROCRIT) 83111 UNIT/ML injection  Self Yes No   Sig: WITH DIALYSIS;   gabapentin (NEURONTIN) 300 MG capsule   Yes No   Sig: Take 300 mg by mouth 2 times daily    gabapentin (NEURONTIN) 300 MG capsule   Yes No   Sig: Take 300 mg by mouth as needed May take after HD.   guaiFENesin (MUCINEX) 600 MG 12 hr tablet   Yes No   Sig: Take 1,200 mg by mouth 2 times daily as needed    imiquimod (ALDARA) 5 % cream  Self Yes No   Sig: Apply topically as needed   insulin lispro (HUMALOG PENFILL) 100 UNIT/ML Cartridge   No No   Sig: Inject 2 Units Subcutaneous 3 times daily (before meals)   insulin lispro (HUMALOG VIAL) 100 UNIT/ML vial   No No   Sig: Inject Subcutaneous 3 times daily (before meals) Takes 2 units for every 50 BG above 150.   ipratropium - albuterol 0.5 mg/2.5 mg/3 mL (DUONEB) 0.5-2.5 (3) MG/3ML neb solution  Self No No   Sig: Take 1 vial (3 mLs) by nebulization every 6 hours as needed for shortness of breath / dyspnea or wheezing   irbesartan (AVAPRO) 300 MG tablet  Self No No   Sig: Take 1 tablet (300 mg) by mouth At Bedtime   ketoconazole (NIZORAL) 2 % cream  Self Yes No   Sig: Apply topically daily Between toes for fungal infection   melatonin 5 MG tablet   Yes No   Sig: Take 5 mg by mouth nightly as needed    mirtazapine (REMERON) 15 MG tablet  Self Yes No   Sig: Take 15 mg by mouth At Bedtime   nitroglycerin (NITROSTAT) 0.4 MG SL tablet  Self No No   Sig: One tablet under the tongue every 5 minutes if needed for chest pain. May repeat every 5 minutes for a  "maximum of 3 doses in 15 minutes\"   pantoprazole (PROTONIX) 40 MG EC tablet  Self Yes No   Sig: Take 40 mg by mouth daily   polyethylene glycol (MIRALAX/GLYCOLAX) packet   Yes No   Sig: Take 1 packet by mouth daily as needed for constipation   ritonavir (NORVIR) 100 MG capsule  Self Yes No   Sig: Take 1 capsule by mouth At Bedtime    sucroferric oxyhydroxide (VELPHORO) 500 MG CHEW chewable tablet  Self Yes No   Sig: Take 1,000 mg by mouth 3 times daily (with meals)    umeclidinium (INCRUSE ELLIPTA) 62.5 MCG/INH inhaler   Yes No   Sig: Inhale 1 puff into the lungs daily      Facility-Administered Medications: None     Allergies   Allergies   Allergen Reactions     Calcium Acetate Other (See Comments)     Other reaction(s): Other, see comments  Pain in chest and back  Pain in chest area (sensitive skin)      Contrast Dye Other (See Comments)     Contrast nephropathy     Diagnostic X-Ray Materials Other (See Comments)     PN: renal failure     Lisinopril      Sulfa Drugs        Physical Exam   Vital Signs:     BP: 136/86 Pulse: 83 Heart Rate: 82   SpO2: 93 % O2 Device: Nasal cannula Oxygen Delivery: 2 LPM  Weight: 0 lbs 0 oz    Gen:  NAD, A&Ox3.  Eyes:  PERRL, sclera anicteric.  OP:  MMM, no lesions.  Neck:  Supple.  CV:  Regular, +2/6 murmur.  Lung: Few scattered crackles at bases b/l, normal effort.  Ab:  +BS, soft.  Skin:  Warm, dry to touch.  No rash.  Ext:  No pitting edema LE b/l.      Data   Data reviewed today: I reviewed all medications, new labs and imaging results over the last 24 hours. I personally reviewed the EKG tracing showing Sinus rhythm, first-degree AV block, left bundle branch block.    Recent Labs   Lab 03/30/20  0156   WBC 6.4   HGB 8.7*   MCV 94      *   POTASSIUM 5.4*   CHLORIDE 97   CO2 25   BUN 85*   CR 9.53*   ANIONGAP 10   PRABHA 9.6   *   TROPI 0.073*     "

## 2020-03-31 NOTE — PLAN OF CARE
Assumed care from 1900 to 0730  A&Ox4, up Ax1 w/GB & walker  LDA: PIV infusing hep gtt  Vitals: stable, RA  Pain: chronic BLE, denies CP   & 137, renal diet  Tele: SR, 1*AVB & BBB  Skin: BLE edema, LUE fistula CDI  GI/: BMx2 this shift   Followed by Cardiology, Nephrology  Plan: Hep gtt, trend trops, repeat ECHO, NPO   Will continue to monitor

## 2020-03-31 NOTE — PLAN OF CARE
Pt a/o, up with SBA and walker. VSS. Denies chest pain. Had echo today , EF is 45-55% with nodule noted- see cards note. Blood cultures pending. Discharge tomorrow.

## 2020-03-31 NOTE — PROGRESS NOTES
Cardiology Progress Note          Assessment and Plan:       70-year-old gentleman with a very complex medical history.  Pertinent history is multivessel coronary disease with multiple previous revascularizations.  Most recently, at [facility] in 12/2019 he underwent a percutaneous coronary intervention to his right coronary artery, the DEANNA and the PDA with drug-eluting stents.  He has moderate aortic stenosis, type 2 diabetes, end-stage renal disease on dialysis Monday, Wednesday, Friday and a history of HIV with AIDS.  He has had colon cancer.  He has anemia of chronic disease with recent blood transfusion in the last 6 months due to hemoglobin less than 7 without evidence of acute blood loss.  He presented to the emergency room  with symptoms of shortness of breath and chest pain. He stopped taking his plavix prematurely for at least last month (patient unsure how long)  Peak troponin 4.8  1. Restarted on plavix with bolus dose, on heparin gtt for 48hrs  2. Echo shows mild LV dysfunction EF 45-50% with mild basilar inferolateral hypokinesis  3. Intermittent CPs relieved with sublingual NTG  4. Mild orthopneic symptoms developing on non dialysis day (felt great after dialysis yesterday)  He does not urinate much but would he benefit from lasix on non dialysis days? (he says he was taken off lasix)  5. Echo read as severe AS, mention fusion of cusps.  By my review, there is a nodular density in between non coronary and right that I do not appreciate as well on previous study.  As precaution will recommend blood cultures.  6. Will recommend monitoring another day for recurrent significant CPs, see how he does after another dialysis run and talk to nephrology to see if he would benefit from non dialysis day lasix  7. Close follow up with his primary cards- Dr. Diaz, consider repeating echo in 1 mos to assess that nodular density in between cusps of AV          Interval History:   Mild orthopnea with laying flat                 Medications:   I have reviewed this patient's current medications         Physical Exam:         Vital Sign Ranges  Temperature Temp  Av  F (36.1  C)  Min: 95.8  F (35.4  C)  Max: 98.3  F (36.8  C)   Blood pressure Systolic (24hrs), Av , Min:100 , Max:164        Diastolic (24hrs), Av, Min:52, Max:66      Pulse No data recorded   Respirations Resp  Av  Min: 16  Max: 20   Pulse oximetry SpO2  Av.3 %  Min: 91 %  Max: 96 %         Intake/Output Summary (Last 24 hours) at 3/31/2020 1246  Last data filed at 3/31/2020 0530  Gross per 24 hour   Intake 485 ml   Output 3500 ml   Net -3015 ml       Constitutional:   in no apparent distress     Skin:   normal     Neck:   supple, symmetrical, trachea midline     Chest:   Normal Symmetry and no tenderness     Lungs:   diminshed BS     Cardiovascular:   Reg KELSEA     Extremities and Back:   No edema     Neurological:   No gross or focal neurologic abnormalities              Data:     Results for orders placed or performed during the hospital encounter of 20 (from the past 24 hour(s))   Care Coordinator IP Consult    Narrative    Rob Mina RN     3/30/2020  2:36 PM  CTS     Care Transition Initial Assessment - RN        Met with: Patient.  DATA   Active Problems:    Hypoxia       Cognitive Status: awake, alert and oriented.  Contact information and PCP information verified: Yes        Insurance concerns: No Insurance issues identified  ASSESSMENT  Patient currently receives the following services:  None. Patient   completed [facility] RN SW OT PT services on   3/24/2020        Identified issues/concerns regarding health management:   Patient identifies high risk with elevated MUSHTAQ. Patient has x3   inpatient visit this 2020. Patient has a complex medical   history, including CAD, DM2, HIV and ESRD requiring HD MWF.   Patient admitted 3/30 with CP and SOB. His last 3 admissions have   been related to CP. Patient had  lexiscan 3/17/20 which was   negative for ischemia. Patient currently receiving hemodialysis.     Met with patient for discharge planning and Elevated MUSHTAQ. Patient   reports he lives at home with his wife and brother. States he was   receiving home care services thru [facility].  [facility] ended 3/24/2020. Patient denies further home care   needs at this time. He ambulates with a walker. States his   daughter, wife and family are supportive and provide assistance   as needed. He reports having dialysis MWF at [facility]   in [place]. Daughter and wife provide transportation.     Patient is followed by his PCP, Dr. Sukh Owen, with [facility] [place]. He is followed by    Nephrology and  Endocrinology Clinic. Patient is also followed   by [facility]. Discussed and encouraged post   hospitalization follow up. Patient is agreeable and requested CC   assistance with scheduling f/u with his PCP.     An appointment was scheduled with Dr. Lenore Trotter at [facility] [place] for April 7th at 9:30 am.     PLAN  Financial costs for the patient include none .  Patient given options and choices for discharge yes .  Patient/family is agreeable to the plan?  Yes: patient declining   home care  Patient anticipates discharging to home with family .     Patient anticipates needs for home equipment: No  Transportation/person available to transport on day of discharge    is family.  CTS left a message for patient's daughterAmol.     Plan/Disposition: Home with family    Appointments:   A follow up appointment was scheduled with [facility] [place] provider on Tuesday, April 7th at 9:30 am.     Care  (CTS) will continue to follow as   needed.  Rob Mina RN BSN PHN CCM   Inpatient Care Coordination   [facility]   [phone]                 Troponin I    Result Value Ref Range    Troponin I ES 4.876 (HH) 0.000 - 0.045 ug/L   Glucose by meter   Result Value Ref Range    Glucose 182 (H) 70 - 99 mg/dL   Troponin I   Result Value Ref Range    Troponin I ES 3.388 (HH) 0.000 - 0.045 ug/L   Glucose by meter   Result Value Ref Range    Glucose 218 (H) 70 - 99 mg/dL   Heparin 10a Level   Result Value Ref Range    Heparin 10A Level <0.10 IU/mL   Glucose by meter   Result Value Ref Range    Glucose 137 (H) 70 - 99 mg/dL   Troponin I   Result Value Ref Range    Troponin I ES 1.643 (HH) 0.000 - 0.045 ug/L   CBC with platelets   Result Value Ref Range    WBC 5.1 4.0 - 11.0 10e9/L    RBC Count 2.94 (L) 4.4 - 5.9 10e12/L    Hemoglobin 7.8 (L) 13.3 - 17.7 g/dL    Hematocrit 27.0 (L) 40.0 - 53.0 %    MCV 92 78 - 100 fl    MCH 26.5 26.5 - 33.0 pg    MCHC 28.9 (L) 31.5 - 36.5 g/dL    RDW 18.2 (H) 10.0 - 15.0 %    Platelet Count 123 (L) 150 - 450 10e9/L   Basic metabolic panel   Result Value Ref Range    Sodium 133 133 - 144 mmol/L    Potassium 4.5 3.4 - 5.3 mmol/L    Chloride 96 94 - 109 mmol/L    Carbon Dioxide 25 20 - 32 mmol/L    Anion Gap 12 3 - 14 mmol/L    Glucose 134 (H) 70 - 99 mg/dL    Urea Nitrogen 54 (H) 7 - 30 mg/dL    Creatinine 7.01 (H) 0.66 - 1.25 mg/dL    GFR Estimate 7 (L) >60 mL/min/[1.73_m2]    GFR Estimate If Black 8 (L) >60 mL/min/[1.73_m2]    Calcium 9.5 8.5 - 10.1 mg/dL   Heparin 10a Level   Result Value Ref Range    Heparin 10A Level 0.24 IU/mL   Echocardiogram Complete    Narrative    [id number]  [id number]  [id number]  [id number]^GARCIA^OTIS^[facility]  Echocardiography Laboratory  [address] [place] [address]        Name: GREGORIO BRUSH  MRN: [id number]  : 1950  Study Date: 2020 09:47 AM  Age: 70 yrs  Gender: Male  Patient Location: [facility]  Reason For Study: Chest Pain  Ordering Physician: OTIS GARCIA  Performed By: Elsy Hall     BSA: 2.1 m2  Height: 71 in  Weight: 191 lb  HR: 63  BP: 114/52  mmHg  _____________________________________________________________________________  __        Procedure  Complete Echo Adult.  _____________________________________________________________________________  __        Interpretation Summary     There is moderate concentric left ventricular hypertrophy.  The visual ejection fraction is estimated at 45-50%.  There is borderline global hypokinesia of the left ventricle.  There is mild inferolateral wall hypokinesis.  There is mild right ventricular hypertrophy.  Mildly decreased right ventricular systolic function  The left atrium is moderate to severely dilated.  Calcified mitral apparatus.  There is mild to moderate (1-2+) mitral regurgitation.  There is mild mitral stenosis.  The mean mitral valve gradient is (at HR 63), 4mmHg.  Right ventricular systolic pressure is elevated, consistent with moderate  pulmonary hypertension.  Severe valvular aortic stenosis.  _____________________________________________________________________________  __        Left Ventricle  The left ventricle is normal in size. There is moderate concentric left  ventricular hypertrophy. The visual ejection fraction is estimated at 45-50%.  Diastolic function not assessed due to significant valvular regurgitation.  There is borderline global hypokinesia of the left ventricle. There is mild  inferolateral wall hypokinesis. There is no thrombus seen in the left  ventricle.     Right Ventricle  The right ventricle is normal size. There is a catheter/pacemaker lead seen in  the right ventricle. There is mild right ventricular hypertrophy. Mildly  decreased right ventricular systolic function.     Atria  The left atrium is moderate to severely dilated. Right atrial size is normal.     Mitral Valve  The mitral valve leaflets appear thickened, but open well. There is moderate  mitral annular calcification. Calcified mitral apparatus. There is mild to  moderate (1-2+) mitral regurgitation. There is mild  mitral stenosis. The mean  mitral valve gradient is (at HR 63), 4mmHg.        Tricuspid Valve  There is mild (1+) tricuspid regurgitation. The right ventricular systolic  pressure is approximated at 47.4 mmHg plus the right atrial pressure. Right  ventricular systolic pressure is elevated, consistent with moderate pulmonary  hypertension. IVC diameter and respiratory changes fall into an intermediate  range suggesting an RA pressure of 8 mmHg.     Aortic Valve  Aortic valve is likely a trileaflet valve with partial fusion of left/non cor  cusps. Severe valvular aortic stenosis. The mean AoV pressure gradient is 39.1  mmHg. The calculated aortic valve are is 0.84 cm^2.     Pulmonic Valve  The pulmonic valve is not well seen, but is grossly normal.     Vessels  Borderline aortic root dilatation.     Pericardium  The pericardium appears normal.        Rhythm  Sinus rhythm was noted.  _____________________________________________________________________________  __  MMode/2D Measurements & Calculations     IVSd: 1.6 cm  LVIDd: 5.0 cm  LVIDs: 4.0 cm  LVPWd: 1.3 cm  FS: 20.4 %  LV mass(C)d: 314.5 grams  LV mass(C)dI: 152.1 grams/m2  Ao root diam: 3.8 cm  asc Aorta Diam: 3.7 cm  LVOT diam: 2.4 cm  LVOT area: 4.5 cm2  LA Volume (BP): 104.0 ml  LA Volume Index (BP): 50.2 ml/m2  RWT: 0.52           Doppler Measurements & Calculations  MV E max danny: 158.0 cm/sec  MV A max danny: 93.0 cm/sec  MV E/A: 1.7  MV max PG: 10.6 mmHg  MV mean P.1 mmHg  MV V2 VTI: 48.1 cm  MVA(VTI): 1.7 cm2  MV P1/2t max danny: 167.0 cm/sec  MV P1/2t: 97.7 msec  MVA(P1/2t): 2.3 cm2  MV dec slope: 500.3 cm/sec2  MV dec time: 0.18 sec  Ao V2 max: 389.0 cm/sec  Ao max P.0 mmHg  Ao V2 mean: 300.7 cm/sec  Ao mean P.1 mmHg  Ao V2 VTI: 95.9 cm  TERI(I,D): 0.84 cm2  TERI(V,D): 0.95 cm2  LV V1 max P.7 mmHg  LV V1 max: 82.6 cm/sec  LV V1 VTI: 18.1 cm  SV(LVOT): 80.8 ml  SI(LVOT): 39.1 ml/m2  PA acc time: 0.09 sec  PI end-d danny: 202.2 cm/sec  TR max  randy: 344.2 cm/sec  TR max P.4 mmHg  AV Randy Ratio (DI): 0.21  TERI Index (cm2/m2): 0.41  E/E' av.1  Lateral E/e': 29.2  Medial E/e': 39.0              _____________________________________________________________________________  __           Report approved by: Digna Ordonez 2020 12:23 PM      Glucose by meter   Result Value Ref Range    Glucose 130 (H) 70 - 99 mg/dL

## 2020-03-31 NOTE — PROGRESS NOTES
[facility]    Medicine Progress Note - Hospitalist Service       Date of Admission:  3/30/2020  Assessment & Plan     Donald Raza is a 70 year old male with CAD (details below), ESRD on HD (MWF), DM2, HTN, HLP, chronic anemia, HIV (on treatment), aortic stenosis, bradycardia now with pacemaker, admission in early March for chest pain/troponin leak admitted on 3/16/2020 with chest pain again     Chest pain in the setting of CAD    - patient follows with Dr. Diaz    - multiple NSTEMI with diagonal and LAD stenting (2015) with repeat LAD/Diagonal stenting due to in-stent restenosis (8/2016)    - repeat CP with angiograms in 2017 and 2018 showing patent stents and non-flow limiting diagonal and side branch disease    - December 2019: admitted to [facility] for CP and had angiogram revealing a 99% lesion in rPDA and 85% DEANNA lesion: had VANITA to dRCA/DEANNA and VANITA to PDA; prior LAD and Diagonal stents were patent with up to 55% narrowing; placed on Plavix    - admitted here from 3/2 to 3/5 for chest pain which was felt to be from his anemia    - admitted here from 3/16 to 3/17 for chest pain, vera was done and was negative     - admitted again for chest pain: had rising trops to 4.867, now trending down    - patient revealed he had stopped his Plavix    - cards consulted: on heparin drip, ECHO to be done. If no wall motion abnormality, heparin x 48 hours, if yes, repeat angio    - cont home Imdur, ASA, carvedilol    - currently NPO pending ECHO results     Addendum: spoke with Dr. Vargas. She has reviewed the ECHO. Recommends to stay on heparin x 48hrs. Consider lasix (will discuss with Nephrology), blood cultures (there was a small nodule she saw on his valve on ECHO). She will have Dr. Diaz repeat an ECHO in 1 month. Will order diabetic diet. Again, likely the patient is very sensitive to his volume status due to his severe AORTIC STENOSIS.    ESRD    - dialysis MWF    - was dialyzed yesterday     HTN    -  cont carvedilol, irbesartan     HLP    - on lipitor      DM    - last HBA1c was 5.1 on 3/2/2020    - uses pre-meal insulin    - will cover with ISS     HIV    - cont anti-virals    - last viral load was done in Feb and was undetectable     History bradycardia    - has pacemaker     Has moderate-severe aortic stenosis    - had repeat ECHO on 3/3     Called his daughter and updated her (left message)    Diet: Diet  NPO per Anesthesia Guidelines for Procedure/Surgery Except for: Meds, Ice Chips    DVT Prophylaxis: on heparin  Christie Catheter: not present  Code Status: Full Code      Disposition Plan   Expected discharge: 1-2 days, recommended to prior living arrangement once work-up done.  Entered: Meek West MD 03/31/2020, 10:44 AM       The patient's care was discussed with the Bedside Nurse, Patient and Patient's Family.    Meek West MD  Hospitalist Service  [facility]    ______________________________________________________________________    Interval History   Patient asleep. Arouses to voice. Denies chest pain today. Some SOB. Feels a little tired and weak today.     Data reviewed today: I reviewed all medications, new labs and imaging results over the last 24 hours. I personally reviewed no images or EKG's today.    Physical Exam   Vital Signs: Temp: 97  F (36.1  C) Temp src: Oral BP: 117/65   Heart Rate: 68 Resp: 20 SpO2: 93 % O2 Device: None (Room air)    Weight: 191 lbs 8 oz  Constitutional: awake, alert, cooperative, no apparent distress, and appears stated age  Eyes: Lids and lashes normal, pupils equal, round and reactive to light, extra ocular muscles intact, sclera clear, conjunctiva normal  ENT: Normocephalic, without obvious abnormality, atraumatic, sinuses nontender on palpation, external ears without lesions, oral pharynx with moist mucous membranes, tonsils without erythema or exudates, gums normal and good dentition.  Respiratory: No increased work of breathing, good air  exchange, clear to auscultation bilaterally, no crackles or wheezing  Cardiovascular: s1s2 +5/6 KELSEA  GI: No scars, normal bowel sounds, soft, non-distended, non-tender, no masses palpated, no hepatosplenomegally  Skin: no bruising or bleeding  Musculoskeletal: no lower extremity pitting edema present    Data   Recent Labs   Lab 03/31/20  0612 03/30/20  1952 03/30/20  1502  03/30/20  0156   WBC 5.1  --   --   --  6.4   HGB 7.8*  --   --   --  8.7*   MCV 92  --   --   --  94   *  --   --   --  154     --   --   --  132*   POTASSIUM 4.5  --   --   --  5.4*   CHLORIDE 96  --   --   --  97   CO2 25  --   --   --  25   BUN 54*  --   --   --  85*   CR 7.01*  --   --   --  9.53*   ANIONGAP 12  --   --   --  10   PRABHA 9.5  --   --   --  9.6   *  --   --   --  284*   TROPI 1.643* 3.388* 4.876*   < > 0.073*    < > = values in this interval not displayed.     No results found for this or any previous visit (from the past 24 hour(s)).

## 2020-04-01 NOTE — PROGRESS NOTES
Renal Medicine Inpatient Dialysis Note                                Donald Raza MRN# [id number]   Age: 70 year old YOB: 1950   Date of Admission: 3/30/2020 Hospital LOS: 2          Assessment/Plan:     Admitted with CP and SOB.  Concern for volume overload.  Frequent admissions under similar circumstances    Seen for ESRD management        1.  ESRD              -MWF [facility]              -AVF              -target weight 86 kg  2.  Anemia              -MIHAI              -transfuse as indicated  3.  Mineral Bone Disease  4.  CP              -multiple evaluations by cardiology  5.  HTN  6.  HIV +      Continue MWF schedule  Next 04/03/20    Probably OK to start PO diuretic on non dialysis days  Furosemide 40 mg TTHSS for example       Interval History:     Dialysis run parameters reviewed with dialysis RN at patient bedside    3.25 hour run  2K  3.5 to 4 liter UF  MIHAI  Low dose heparin    Stable initial portion of run    Patient appears comfortable      ROS     GENERAL: NAD, No fever,chills  RESP:dyspnea on exertion  CV: NEGATIVE for chest pain, palpitations  EXT: no change edema  ROS otherwise negative    Dialysis Parameters:     Vitals were reviewed  Patient Vitals for the past 8 hrs:   BP Temp Temp src Heart Rate Resp SpO2 Weight   04/01/20 1130 124/64 -- -- 64 18 -- --   04/01/20 1115 111/77 -- -- 63 18 -- --   04/01/20 1100 (!) 112/97 -- -- 64 18 -- --   04/01/20 1045 125/51 -- -- 68 18 -- --   04/01/20 1030 123/50 -- -- 70 18 -- --   04/01/20 1015 102/72 -- -- 60 18 -- --   04/01/20 1000 122/64 -- -- 64 18 -- --   04/01/20 0945 120/61 -- -- 64 18 -- --   04/01/20 0930 119/50 96.5  F (35.8  C) Oral 68 18 99 % --   04/01/20 0900 -- -- -- -- -- -- 86.8 kg (191 lb 5.8 oz)   04/01/20 0751 118/68 96.1  F (35.6  C) Oral 60 18 97 % --     I/O last 3 completed shifts:  In: 240 [P.O.:240]  Out: -     Vitals:    03/30/20 0352 03/31/20 0006 04/01/20 0900   Weight: 89.7 kg (197 lb 11.2 oz) 86.9 kg  "(191 lb 8 oz) 86.8 kg (191 lb 5.8 oz)       Current Weight: 86.8  Dry Weight: 86  Dialysis Temp: 36.5  C  Access Device: AVF  Access Site: left  Dialyzer: Revaclear  Dialysis Bath: 2  Sodium Profile: n  UF Goal: 4  Blood Flow Rate (mL/min): 400  Total Treatment Time (hrs): 3.25  Heparin: Low dose as required      EPO dose: y  Zemplar: n  IV Fe: n      Medications and Allergies:     Reviewed      Physical Exam:     Seen and examined during course of dialysis run    /64   Pulse 83   Temp 96.5  F (35.8  C) (Oral)   Resp 18   Ht 1.803 m (5' 11\")   Wt 86.8 kg (191 lb 5.8 oz)   SpO2 99%   BMI 26.69 kg/m      GENERAL: awake, alert, follows  HEENT: NC/AT, PERRLA, EOMI, non icteric, pharynx moist without lesion  RESP: basilar crackles  CV: RRR, normal S1 S2  ABDOMEN: S/NT, BS present  MS: no edema  EXT: access canulated without issue  NEURO: speech normal and cranial nerves 2-12 intact  PSYCH: affect normal/bright    Data:       Recent Labs   Lab 04/01/20  0622   *   POTASSIUM 5.6*   CHLORIDE 95   CO2 24   ANIONGAP 10   *   BUN 78*   CR 9.06*   GFRESTIMATED 5*   GFRESTBLACK 6*   PRABHA 9.5       Recent Labs   Lab 04/01/20  0622 03/31/20  0612 03/30/20  0156   HGB 7.9* 7.8* 8.7*         G David Fuller MD    [facility] - Nephrology  [phone]          "

## 2020-04-01 NOTE — PLAN OF CARE
Patient's After Visit Summary was reviewed with patient.   Patient verbalized understanding of After Visit Summary, recommended follow up and was given an opportunity to ask questions.   Discharge medications sent home with patient/family: YES   Discharged with other: friend. Wheelchair ride provided downstairs.     OBSERVATION patient END time: 1541

## 2020-04-01 NOTE — PROGRESS NOTES
Potassium   Date Value Ref Range Status   04/01/2020 5.6 (H) 3.4 - 5.3 mmol/L Final     Hemoglobin   Date Value Ref Range Status   04/01/2020 7.9 (L) 13.3 - 17.7 g/dL Final     Creatinine   Date Value Ref Range Status   04/01/2020 9.06 (H) 0.66 - 1.25 mg/dL Final     Urea Nitrogen   Date Value Ref Range Status   04/01/2020 78 (H) 7 - 30 mg/dL Final     Sodium   Date Value Ref Range Status   04/01/2020 129 (L) 133 - 144 mmol/L Final     INR   Date Value Ref Range Status   03/16/2020 1.08 0.86 - 1.14 Final       DIALYSIS PROCEDURE NOTE  Hepatitis status of previous patient on machine log was checked and verified ok to use with this patients hepatitis status.  Patient dialyzed for 3.25 hrs. on a 2 K bath with a net fluid removal of  3L.  A BFR of 400 ml/min was obtained via a left upper arm fistual using 15gauge needles.    The treatment plan was dicussed with Dr. Reed during the treatment.  Total heparin received during the treatment: 0 units.   Needle cannulation sites held x 7 min, pressure dressings applied after hemostasis.    Meds  given: Epogen Complications: none    Reviewed diet and fluid restriction with patient.  He had a good understanding and there were no barriers to his learning  ICEBOAT? Timeout performed pre-treatment  I: Patient was identified using 2 identifiers  C: Consent obtained/verified current before treatment  E: Equipment preventative maintenance is current and dialysis delivery system OK to use  B: Hepatitis B Surface Antigen: negative; Draw Date: 10/29/19      Hepatitis B Surface Antibody: immune; Draw Date: 11/21/19  O: Dialysis orders present and complete prior to treatment  A: Vascular access verified and assessed prior to treatment  T: Treatment was performed at a clinically appropriate time  ?: Patient was allowed to ask questions and address concerns prior to treatment  See flowsheet in EPIC for further details and post assessment.  Machine water alarm in place and functioning.  Transducer pods intact and checked every 15min.  Pt returned via wheelchair  Report received from: Daniele SILVERMAN  Report given to: SOY Lopez RN  Chlorine/Chloramine water system checked every 4 hours.

## 2020-04-01 NOTE — PLAN OF CARE
Patient care from [id number]    Patient alert and oriented x4. Vitals are Temp: 97.5  F (36.4  C) Temp src: Oral BP: 112/52   Heart Rate: 69 Resp: 20 SpO2: 94 % 2 LPM. Denies pain. Denies chest pain and pressure. Reports shortness of breath, relieve with O2 and sitting up. Tele SR with 1st degree AV block and BBB. Lung sounds diminished. Patient on hemodialysis and does not produce urine. Bowel sounds active. Denies nausea. Tolerating mod CHO/dialysis diet. Heparin infusing. Patient up with standby assistance with gait belt, uses walker for further distances. Blood cultures pending. Patient dialyses MWF, fistula in left arm is WNL. Plan to continue with symptom management and supportive cares.

## 2020-04-01 NOTE — PLAN OF CARE
Neuro: WDL  Cardiac: Tele- SR w/ HR of 70  Lungs: WDL  GI: WDL  : WDL  Skin: some bruising noted  Pain: Denies  IV: Hep infusing @ 13.5 mL/hr  Labs/Test: BG checks   Diet: Mod Carb  Activity: SBA w/ GB and walker for long distances  Plan: Hep gtt, Tele monitoring, Patient had dialysis today. Most likely will discharge home this evening.        Follow up w/ Dr. Diaz for repeat ECHO in one month regarding nodular density between AV cusps

## 2020-04-01 NOTE — PLAN OF CARE
"/60 (BP Location: Right arm)   Pulse 83   Temp 96.6  F (35.9  C) (Oral)   Resp 18   Ht 1.803 m (5' 11\")   Wt 86.9 kg (191 lb 8 oz)   SpO2 99%   BMI 26.71 kg/m    Neuro: WDL  Cardiac: Tele- SR w/ HR of 68  Lungs: WDL  GI: WDL  : WDL  Skin: some bruising noted  Pain: Denies  IV: Hep infusing @ 12.5 mL/hr  Meds: requested O2 overnight for comfort  Labs/Test: BG @ 0200- 113  Diet: Mod Carb  Activity: SBA w/ GB and walker for long distances  Plan: Hep gtt for 48 hrs, Tele monitoring, Follow up w/ Dr. Diaz for repeat ECHO in one month regarding nodular density between AV cusps    "

## 2020-04-01 NOTE — PROGRESS NOTES
Brief Cardiology Progress Note:  Patient not seen due to being off the floor at dialysis. Plan of care discussed with Dr. West with the primary team and Nephrology. Plan to trial lasix 40 mg once daily on non-dialysis days to try to help manage mild orthopneic symptoms developing on non dialysis day. Suspect presenting symptoms likely due to fluid overload with aortic stenosis contributing. Restarted on plavix following a bolus dose, on heparin gtt for 48 hrs. Plan for likely discharge today. Will arrange close follow up with a cardiology RENETTA in 1-2 weeks post discharge and with his primary cardiologist, Dr. Diaz, in 1 month. Consider repeating echo in 1 month to assess nodular density in between cusps of AV. ADEDRICK Tapia, CNP - 04/01/20, 11:30 AM

## 2020-04-01 NOTE — DISCHARGE SUMMARY
[facility]  Hospitalist Discharge Summary       Date of Admission:  3/30/2020  Date of Discharge:  4/1/2020  Discharging Provider: Meek West MD      Discharge Diagnoses   Chest pain. Likely due to severe AORTIC STENOSIS and volume overload/need for dialysis    Follow-ups Needed After Discharge   Follow-up Appointments     Follow-up and recommended labs and tests       Follow up with primary care provider, Sukh Owen, within 7 days for   hospital follow- up.  Follow-up labs: as per Nephrology. Follow-up with   your Cardiologist's office within 7 days.             Unresulted Labs Ordered in the Past 30 Days of this Admission     Date and Time Order Name Status Description    3/31/2020 1358 Blood culture Preliminary     3/31/2020 1358 Blood culture Preliminary       These results will be followed up by Hospitalist Group    Discharge Disposition   Discharged to home  Condition at discharge: Stable    Hospital Course   Donald Raza is a 70 year old male who has a past medical history significant for HIV, coronary artery disease, end-stage kidney disease on hemodialysis Monday/Wednesday/Friday, hypertension, diabetes mellitus type 2, hyperlipidemia, chronic anemia, aortic stenosis, and previous pacemaker placement.  He has had multiple recent hospitalizations for chest pain.  Most recent prior hospital stay began on 3/16.  During that stay, he did have cardiac stress testing performed.  He did have what he describes as a normal dialysis run on Friday.  Had been feeling okay after dialysis and on Saturday.  Developed chest pain the evening of 3/29.  Pain located in the center of his chest.  States that the pain feels very similar to previous episodes of pain.  Pain did not radiate.  He did take 3 nitroglycerin at home.  Nitroglycerin was helping the pain for approximately 15 minutes at a time.  When pain continued to come back despite nitroglycerin, he called EMS to bring him to emergency room.   He is also been feeling short of breath beginning about the same time last night.  He has not had a cough.  Has not felt any fevers or chills.  Has not been around anyone else who is ill.  The only time that he has left his houseduring the past 2 weeks has been for dialysis per his report.  Chest pain has currently resolved with medication given in emergency room.  Breathing feels better now the pain has resolved.  Still a little short of breath.  No other acute complaints.    Patient was admitted to the Hospital. His troponins bumped. He was seen by cards. Apparently he had stopped his Plavix. ECHO did not show any wall motion abnormalties. spoke with Dr. Vargas. She has reviewed the ECHO. Recommends to stay on heparin x 48hrs. Consider lasix (will discuss with Nephrology), blood cultures (there was a small nodule she saw on his valve on ECHO). She will have Dr. Diaz repeat an ECHO in 1 month. Will order diabetic diet. Again, likely the patient is very sensitive to his volume status due to his severe AORTIC STENOSIS. Today patient denies chest pain. Will discharge home. I did speak with Dr. Vargas and left a message for his daughter.    Consultations This Hospital Stay   NEPHROLOGY IP CONSULT  CARE COORDINATOR IP CONSULT  CARDIOLOGY IP CONSULT  PHARMACY TO DOSE HEPARIN    Code Status   Full Code    Time Spent on this Encounter   I, Meek West MD, personally saw the patient today and spent greater than 30 minutes discharging this patient.       Meek West MD  [facility]  ______________________________________________________________________    Physical Exam   Vital Signs: Temp: 96.5  F (35.8  C) Temp src: Oral BP: 123/50   Heart Rate: 70 Resp: 18 SpO2: 99 % O2 Device: None (Room air) Oxygen Delivery: 2 LPM  Weight: 191 lbs 5.75 oz  Constitutional: awake, alert, cooperative, no apparent distress, and appears stated age  Eyes: Lids and lashes normal, pupils equal, round and reactive to  light, extra ocular muscles intact, sclera clear, conjunctiva normal  ENT: Normocephalic, without obvious abnormality, atraumatic, sinuses nontender on palpation, external ears without lesions, oral pharynx with moist mucous membranes, tonsils without erythema or exudates, gums normal and good dentition.  Respiratory: No increased work of breathing, good air exchange, clear to auscultation bilaterally, no crackles or wheezing  Cardiovascular: Normal apical impulse, regular rate and rhythm, normal S1 and S2, no S3 or S4, and no murmur noted  GI: No scars, normal bowel sounds, soft, non-distended, non-tender, no masses palpated, no hepatosplenomegally  Skin: no bruising or bleeding  Musculoskeletal: no lower extremity pitting edema present       Primary Care Physician   Sukh Owen    Discharge Orders      Discharge Order: F/U with Cardiac  RENETTA      Follow-Up with Cardiologist      Activity    Your activity upon discharge: activity as tolerated     Follow-up and recommended labs and tests     Follow up with primary care provider, Sukh Owen, within 7 days for hospital follow- up.  Follow-up labs: as per Nephrology. Follow-up with your Cardiologist's office within 7 days.     Reason for your hospital stay    You were admitted for concern of chest pain. Likely it is related to needing dialysis and your severe aortic stenosis. YOU MUST CONTINUE TO TAKE ALL YOUR MEDICATIONS, including your Plavix.     Full Code     Diet    Follow this diet upon discharge: Orders Placed This Encounter      Dialysis Diet       Significant Results and Procedures   Most Recent 3 CBC's:  Recent Labs   Lab Test 04/01/20  0622 03/31/20  0612 03/30/20  0156   WBC 5.3 5.1 6.4   HGB 7.9* 7.8* 8.7*   MCV 93 92 94   * 123* 154     Most Recent 3 BMP's:  Recent Labs   Lab Test 04/01/20  0622 03/31/20  0612 03/30/20  0156   * 133 132*   POTASSIUM 5.6* 4.5 5.4*   CHLORIDE 95 96 97   CO2 24 25 25   BUN 78* 54* 85*   CR 9.06* 7.01*  9.53*   ANIONGAP 10 12 10   PRABHA 9.5 9.5 9.6   * 134* 284*     Most Recent 2 LFT's:  Recent Labs   Lab Test 19  2213 10/18/19  0546   AST 15 15   ALT 16 12   ALKPHOS 104 76   BILITOTAL 0.6 0.5     Most Recent 3 Troponin's:  Recent Labs   Lab Test 20  0612 20  1952 20  1502  18  0542  17  0856  04/15/17  0820   TROPI 1.643* 3.388* 4.876*   < > 0.043   < >  --    < >  --    TROPONIN  --   --   --   --  0.02  --  0.02  --  0.02    < > = values in this interval not displayed.   ,   Results for orders placed or performed during the hospital encounter of 20   XR Chest 2 Views    Narrative    EXAM: XR CHEST 2 VW  LOCATION: [facility]  DATE/TIME: 3/30/2020 2:21 AM    INDICATION: Chest pain. Evaluate for pneumonia.  COMPARISON: 2019.      Impression    IMPRESSION: Mild bilateral interstitial opacities may be due to edema or pneumonia. Stable heart size, upper normal. Right chest wall cardiac device.   Echocardiogram Complete    Narrative    [id number]  [id number]  [id number]  [id number]^RADHA^OTIS^VANDA           [facility]  Echocardiography Laboratory  [address] [place] [address]        Name: GREGORIO BRUSH  MRN: [id number]  : 1950  Study Date: 2020 09:47 AM  Age: 70 yrs  Gender: Male  Patient Location: [facility]  Reason For Study: Chest Pain  Ordering Physician: OTIS GARCIA  Performed By: Elsy Hall     BSA: 2.1 m2  Height: 71 in  Weight: 191 lb  HR: 63  BP: 114/52 mmHg  _____________________________________________________________________________  __        Procedure  Complete Echo Adult.  _____________________________________________________________________________  __        Interpretation Summary     There is moderate concentric left ventricular hypertrophy.  The visual ejection fraction is estimated at 45-50%.  There is borderline global hypokinesia of the left ventricle.  There is mild inferolateral wall  hypokinesis.  There is mild right ventricular hypertrophy.  Mildly decreased right ventricular systolic function  The left atrium is moderate to severely dilated.  Calcified mitral apparatus.  There is mild to moderate (1-2+) mitral regurgitation.  There is mild mitral stenosis.  The mean mitral valve gradient is (at HR 63), 4mmHg.  Right ventricular systolic pressure is elevated, consistent with moderate  pulmonary hypertension.  Severe valvular aortic stenosis.  _____________________________________________________________________________  __        Left Ventricle  The left ventricle is normal in size. There is moderate concentric left  ventricular hypertrophy. The visual ejection fraction is estimated at 45-50%.  Diastolic function not assessed due to significant valvular regurgitation.  There is borderline global hypokinesia of the left ventricle. There is mild  inferolateral wall hypokinesis. There is no thrombus seen in the left  ventricle.     Right Ventricle  The right ventricle is normal size. There is a catheter/pacemaker lead seen in  the right ventricle. There is mild right ventricular hypertrophy. Mildly  decreased right ventricular systolic function.     Atria  The left atrium is moderate to severely dilated. Right atrial size is normal.     Mitral Valve  The mitral valve leaflets appear thickened, but open well. There is moderate  mitral annular calcification. Calcified mitral apparatus. There is mild to  moderate (1-2+) mitral regurgitation. There is mild mitral stenosis. The mean  mitral valve gradient is (at HR 63), 4mmHg.        Tricuspid Valve  There is mild (1+) tricuspid regurgitation. The right ventricular systolic  pressure is approximated at 47.4 mmHg plus the right atrial pressure. Right  ventricular systolic pressure is elevated, consistent with moderate pulmonary  hypertension. IVC diameter and respiratory changes fall into an intermediate  range suggesting an RA pressure of 8 mmHg.      Aortic Valve  Aortic valve is likely a trileaflet valve with partial fusion of left/non cor  cusps. Severe valvular aortic stenosis. The mean AoV pressure gradient is 39.1  mmHg. The calculated aortic valve are is 0.84 cm^2.     Pulmonic Valve  The pulmonic valve is not well seen, but is grossly normal.     Vessels  Borderline aortic root dilatation.     Pericardium  The pericardium appears normal.        Rhythm  Sinus rhythm was noted.  _____________________________________________________________________________  __  MMode/2D Measurements & Calculations     IVSd: 1.6 cm  LVIDd: 5.0 cm  LVIDs: 4.0 cm  LVPWd: 1.3 cm  FS: 20.4 %  LV mass(C)d: 314.5 grams  LV mass(C)dI: 152.1 grams/m2  Ao root diam: 3.8 cm  asc Aorta Diam: 3.7 cm  LVOT diam: 2.4 cm  LVOT area: 4.5 cm2  LA Volume (BP): 104.0 ml  LA Volume Index (BP): 50.2 ml/m2  RWT: 0.52           Doppler Measurements & Calculations  MV E max randy: 158.0 cm/sec  MV A max randy: 93.0 cm/sec  MV E/A: 1.7  MV max PG: 10.6 mmHg  MV mean P.1 mmHg  MV V2 VTI: 48.1 cm  MVA(VTI): 1.7 cm2  MV P1/2t max randy: 167.0 cm/sec  MV P1/2t: 97.7 msec  MVA(P1/2t): 2.3 cm2  MV dec slope: 500.3 cm/sec2  MV dec time: 0.18 sec  Ao V2 max: 389.0 cm/sec  Ao max P.0 mmHg  Ao V2 mean: 300.7 cm/sec  Ao mean P.1 mmHg  Ao V2 VTI: 95.9 cm  TERI(I,D): 0.84 cm2  TERI(V,D): 0.95 cm2  LV V1 max P.7 mmHg  LV V1 max: 82.6 cm/sec  LV V1 VTI: 18.1 cm  SV(LVOT): 80.8 ml  SI(LVOT): 39.1 ml/m2  PA acc time: 0.09 sec  PI end-d randy: 202.2 cm/sec  TR max randy: 344.2 cm/sec  TR max P.4 mmHg  AV Randy Ratio (DI): 0.21  TERI Index (cm2/m2): 0.41  E/E' av.1  Lateral E/e': 29.2  Medial E/e': 39.0              _____________________________________________________________________________  __           Report approved by: Digna Ordonez 2020 12:23 PM            Discharge Medications   Current Discharge Medication List      START taking these medications    Details   furosemide (LASIX) 40  MG tablet Take 1 tablet (40 mg) by mouth See Admin Instructions Take one tablet daily on non-dialysis days (Tuesday, Thursday, Saturday and Sunday)  Qty: 60 tablet, Refills: 1    Associated Diagnoses: Severe aortic stenosis         CONTINUE these medications which have CHANGED    Details   clopidogrel (PLAVIX) 75 MG tablet Take 1 tablet (75 mg) by mouth daily  Qty: 30 tablet, Refills: 1    Associated Diagnoses: Coronary artery disease involving native coronary artery of native heart without angina pectoris         CONTINUE these medications which have NOT CHANGED    Details   abacavir (ZIAGEN) 300 MG tablet Take 600 mg by mouth every evening       albuterol (PROAIR HFA/PROVENTIL HFA/VENTOLIN HFA) 108 (90 BASE) MCG/ACT Inhaler Inhale 2 puffs into the lungs every 6 hours as needed for shortness of breath / dyspnea or wheezing  Qty: 1 Inhaler, Refills: 1    Associated Diagnoses: Cough      aspirin 81 MG EC tablet Take 81 mg by mouth every evening      atorvastatin (LIPITOR) 40 MG tablet Take 40 mg by mouth At Bedtime      carvedilol (COREG) 12.5 MG tablet Take 1 tablet (12.5 mg) by mouth 2 times daily (with meals)  Qty: 60 tablet, Refills: 1    Associated Diagnoses: Hypertensive emergency      chlorhexidine (CHLORHEXIDINE) 0.12 % solution Rinse and gargle 15 ml by mouth twice a day as directed.      CLONAZEPAM PO Take 0.5 mg by mouth nightly as needed for anxiety (restless legs)      cloNIDine (CATAPRES) 0.1 MG tablet Take 0.1 mg by mouth daily as needed (for high blood pressure) If sbp > 180      dapsone 100 MG tablet Take 100 mg by mouth At Bedtime       Darunavir Ethanolate (PREZISTA PO) Take 800 mg by mouth At Bedtime.      dolutegravir (TIVICAY) 50 MG tablet Take 50 mg by mouth At Bedtime      !! gabapentin (NEURONTIN) 300 MG capsule Take 300 mg by mouth as needed May take after HD.      !! gabapentin (NEURONTIN) 300 MG capsule Take 300 mg by mouth 2 times daily       imiquimod (ALDARA) 5 % cream Apply topically  "as needed      insulin aspart (NOVOLOG FLEXPEN) 100 UNIT/ML pen Inject Subcutaneous 3 times daily (with meals) Take 2 units for every 50 BG above 150      ipratropium - albuterol 0.5 mg/2.5 mg/3 mL (DUONEB) 0.5-2.5 (3) MG/3ML neb solution Take 1 vial (3 mLs) by nebulization every 6 hours as needed for shortness of breath / dyspnea or wheezing  Qty: 90 vial, Refills: 1    Associated Diagnoses: Acute respiratory failure with hypoxia (H)      irbesartan (AVAPRO) 300 MG tablet Take 1 tablet (300 mg) by mouth At Bedtime  Qty: 30 tablet, Refills: 0    Associated Diagnoses: Hypertension secondary to other renal disorders      Isosorbide Mononitrate  MG TB24 Take 1 tablet (120 mg) by mouth every evening  Qty: 30 tablet, Refills: 11    Associated Diagnoses: Coronary artery disease involving native heart, angina presence unspecified, unspecified vessel or lesion type; Hypertension secondary to other renal disorders      ketoconazole (NIZORAL) 2 % cream Apply topically daily Between toes for fungal infection      MAGNESIUM OXIDE PO Take 400 mg by mouth At Bedtime      melatonin 5 MG tablet Take 5 mg by mouth nightly as needed       mirtazapine (REMERON) 15 MG tablet Take 15 mg by mouth At Bedtime      nitroglycerin (NITROSTAT) 0.4 MG SL tablet One tablet under the tongue every 5 minutes if needed for chest pain. May repeat every 5 minutes for a maximum of 3 doses in 15 minutes\"  Qty: 25 tablet, Refills: 3    Associated Diagnoses: Coronary artery disease due to lipid rich plaque; Status post coronary angioplasty      ondansetron (ZOFRAN-ODT) 4 MG ODT tab Take 4 mg by mouth every 8 hours as needed for nausea      pantoprazole (PROTONIX) 40 MG EC tablet Take 40 mg by mouth daily      polyethylene glycol (MIRALAX/GLYCOLAX) packet Take 1 packet by mouth daily as needed for constipation      ritonavir (NORVIR) 100 MG capsule Take 1 capsule by mouth At Bedtime       sucroferric oxyhydroxide (VELPHORO) 500 MG CHEW chewable " tablet Take 500 mg by mouth 3 times daily (with meals)       umeclidinium (INCRUSE ELLIPTA) 62.5 MCG/INH inhaler Inhale 1 puff into the lungs daily      cinacalcet (SENSIPAR) 30 MG tablet Take 30 mg by mouth three times a week On dialysis days      DOXERCALCIFEROL IV (given at dialysis)_      epoetin brittni (EPOGEN,PROCRIT) 81904 UNIT/ML injection WITH DIALYSIS;      Nutritional Supplements (NEPRO PO) Take 1 Container by mouth 3 times daily 2-3 times daily       !! - Potential duplicate medications found. Please discuss with provider.        Allergies   Allergies   Allergen Reactions     Calcium Acetate Other (See Comments)     Other reaction(s): Other, see comments  Pain in chest and back  Pain in chest area (sensitive skin)      Contrast Dye Other (See Comments)     Contrast nephropathy     Diagnostic X-Ray Materials Other (See Comments)     PN: renal failure     Lisinopril      Sulfa Drugs

## 2020-04-03 NOTE — TELEPHONE ENCOUNTER
Pt scheduled for BMP and telephone visit 4/13 w/ Claire Adorno CNP. Per Claire would like to have BMP, can have done at Dialysis.   Called [facility] (phone [phone]), they will review w/ the nephrologist and get back to me. Advised to fax the BMP order to [phone].   Claire needs to enter the order for me to fax - will fax when order is in.     Called pt, left vm to call me back to confirm cancelling lab in heart clinic (closed) and to be done at dialysis.   Perez SILVERMAN, BSN  04/03/20 12:43 PM      ADDENDUM - pt called back. Is fine w/ his BMP lab cancel and being done at dialysis. Faxed BMP order to [facility].   Perez SILVERMAN, BSN  04/03/20 2:10 PM

## 2020-04-13 NOTE — PROGRESS NOTES
"TELEPHONE VISIT    Donald Raza is a 70 year old male who is being evaluated via a billable telephone visit.      The patient has been notified of following:     \"This telephone visit will be conducted via a call between you and your physician/provider. Given concern for spread of COVID 19 we are minimizing in person clinic visits when possible. We have found that certain health care needs can be provided without the need for a physical exam.  This service lets us provide the care you need with a short phone conversation.  If a prescription is necessary we can send it directly to your pharmacy.  If lab work is needed we can place an order for that and you can then stop by our lab to have the test done at a later time. If during the course of the call the physician/provider feels a telephone visit is not appropriate, you will not be charged for this service.\"       I have reviewed and updated the patient's Past Medical History, Social History, Family History and Medication List.    MEDICATIONS:  Current Outpatient Medications   Medication Sig Dispense Refill     abacavir (ZIAGEN) 300 MG tablet Take 600 mg by mouth every evening        albuterol (PROAIR HFA/PROVENTIL HFA/VENTOLIN HFA) 108 (90 BASE) MCG/ACT Inhaler Inhale 2 puffs into the lungs every 6 hours as needed for shortness of breath / dyspnea or wheezing 1 Inhaler 1     aspirin 81 MG EC tablet Take 81 mg by mouth every evening       atorvastatin (LIPITOR) 40 MG tablet Take 40 mg by mouth At Bedtime       carvedilol (COREG) 12.5 MG tablet Take 1 tablet (12.5 mg) by mouth 2 times daily (with meals) 60 tablet 1     chlorhexidine (CHLORHEXIDINE) 0.12 % solution Rinse and gargle 15 ml by mouth twice a day as directed.       cinacalcet (SENSIPAR) 30 MG tablet Take 30 mg by mouth three times a week On dialysis days       CLONAZEPAM PO Take 0.5 mg by mouth nightly as needed for anxiety (restless legs)       cloNIDine (CATAPRES) 0.1 MG tablet Take 0.1 mg by mouth " "daily as needed (for high blood pressure) If sbp > 180       clopidogrel (PLAVIX) 75 MG tablet Take 1 tablet (75 mg) by mouth daily 30 tablet 1     dapsone 100 MG tablet Take 100 mg by mouth At Bedtime        Darunavir Ethanolate (PREZISTA PO) Take 800 mg by mouth At Bedtime.       dolutegravir (TIVICAY) 50 MG tablet Take 50 mg by mouth At Bedtime       DOXERCALCIFEROL IV (given at dialysis)_       epoetin brittni (EPOGEN,PROCRIT) 63134 UNIT/ML injection WITH DIALYSIS;       furosemide (LASIX) 40 MG tablet Take 1 tablet (40 mg) by mouth See Admin Instructions Take one tablet daily on non-dialysis days (Tuesday, Thursday, Saturday and Sunday) 60 tablet 1     gabapentin (NEURONTIN) 300 MG capsule Take 300 mg by mouth as needed May take after HD.       gabapentin (NEURONTIN) 300 MG capsule Take 300 mg by mouth 2 times daily        imiquimod (ALDARA) 5 % cream Apply topically as needed       insulin aspart (NOVOLOG FLEXPEN) 100 UNIT/ML pen Inject Subcutaneous 3 times daily (with meals) Take 2 units for every 50 BG above 150       ipratropium - albuterol 0.5 mg/2.5 mg/3 mL (DUONEB) 0.5-2.5 (3) MG/3ML neb solution Take 1 vial (3 mLs) by nebulization every 6 hours as needed for shortness of breath / dyspnea or wheezing 90 vial 1     irbesartan (AVAPRO) 300 MG tablet Take 1 tablet (300 mg) by mouth At Bedtime 30 tablet 0     Isosorbide Mononitrate  MG TB24 Take 1 tablet (120 mg) by mouth every evening 30 tablet 11     ketoconazole (NIZORAL) 2 % cream Apply topically daily Between toes for fungal infection       MAGNESIUM OXIDE PO Take 400 mg by mouth At Bedtime       melatonin 5 MG tablet Take 5 mg by mouth nightly as needed        mirtazapine (REMERON) 15 MG tablet Take 15 mg by mouth At Bedtime       nitroglycerin (NITROSTAT) 0.4 MG SL tablet One tablet under the tongue every 5 minutes if needed for chest pain. May repeat every 5 minutes for a maximum of 3 doses in 15 minutes\" 25 tablet 3     Nutritional Supplements " (NEPRO PO) Take 1 Container by mouth 3 times daily 2-3 times daily       ondansetron (ZOFRAN-ODT) 4 MG ODT tab Take 4 mg by mouth every 8 hours as needed for nausea       pantoprazole (PROTONIX) 40 MG EC tablet Take 40 mg by mouth daily       polyethylene glycol (MIRALAX/GLYCOLAX) packet Take 1 packet by mouth daily as needed for constipation       ritonavir (NORVIR) 100 MG capsule Take 1 capsule by mouth At Bedtime        sucroferric oxyhydroxide (VELPHORO) 500 MG CHEW chewable tablet Take 500 mg by mouth 3 times daily (with meals)        umeclidinium (INCRUSE ELLIPTA) 62.5 MCG/INH inhaler Inhale 1 puff into the lungs daily         ALLERGIES  Calcium acetate; Contrast dye; Diagnostic x-ray materials; Lisinopril; and Sulfa drugs     Patient reported vitals:  BP:no machine  Heart rate:  Weight:180    SHowley CMA    Review Of Systems  Skin: negative  Eyes: negative  Ears/Nose/Throat: epistaxis  Respiratory: Shortness of breath-   Cardiovascular:chest pain 3 days ago   Gastrointestinal: negative  Genitourinary: negative  Musculoskeletal: back pain  Neurologic: numbness or tingling of hands and numbness or tingling of feet  Psychiatric: negative  Hematologic/Lymphatic/Immunologic: negative  Endocrine: diabetes  (ROS TAKEN PRIOR TO VISIT)    Pt gave verbal agreeable consent for telephone visit    HISTORY OF PRESENT ILLNESS    This is a 70 year old male who follows with [facility].  He is a patient of Dr. Diaz seen in our clinic for CAD, ESRD on dialysis, HTN, IDDM, mixed hyperlipidemia, chronic anemia, chronic troponin elevation, HIV positive and cLBBB.    Donald has a complex cardiovascular history. He originally suffered a MI and stenting to LAD/diagnonal (2015).  Since then, he has undergone stenting to ostial Diag, repeat PTCA to LAD due to in-stent restenosis ( 8/2016) He has had multiple hospitalizations for CP and caths in 2017 and 2018 showed patent stents with non-flow limiting diagonal and side  branch disease. Donald also had had multiple hospitalizations for HF exacerbation and significant anemia with associated CP complicating his picture.     He developed symptomatic severe bradycardia and underwent placement of a dual-chamber PPM (5/2019) Device interrogations have shown that he is 18% A-paced, 0% V-paced.  There were no arrhythmias noted.    (12/2019) Donald was admitted to [facility] for CP.  He underwent stenting to dRCA/DEANNA and VANITA to rPDA.  His prior LAD/diagnonal stents were patent.  He again was started on Plavix.    Last month, Donald was admitted for CP that occurred at dialysis.  His mild troponin level (0.8) was not consistent with ACS. It was felt that his CP was likely due to subendocardial ischemia, diastolic dysfunction, anemia, and mild HF.   He clinically improved after dialysis and blood transfusion (hgb 7.2)  His ECHO (3/2/20) showed LVEF 50-55% (global), borderline reduced RVF,  1-2+ MR, mod/severe aortic stenosis (mean AV gradient 35 mmHg)  This showed worsening of aortic stenosis and LVEF from prior ECHO (10/2019)    He was readmitted one week later (3/16/20) for CP.  He underwent a Lindy NUC. (His trop went up to 0.4) This showed no evidence of inducible myocardial ischemia/infarction.  LVEF 52%  He again received dialysis.  His volume status is likely sensitive due to his mod/severe aortic stenosis.     He was admitted (3/30/20) for SOB/CP which again was felt to be due to fluid retention. He had self-stopped his Plavix accidentally.  His troponin cayetano to 4.8. He underwent an ECHO (3/31/20) showing LVEF 45-50% (global + mild inferolateral hypokinesia), mild RV dysfunction, mild/mod MR, mild MS, moderate PHTN, severe aortic stenosis (mean AV gradient 39 mmHg, TERI 0.84 cm2)  There was either partial fusion of cusps vs nodular density in between the non coronary and right cusps of aortic valve.  His blood cultures were negative.  A repeat ECHO recommended for 1 month.  He was restarted on  "Plavix with a bolus.    Donald feels that he has been fairly stable for the past 2 weeks.  He admits to 2 episodes of CP since he has been home.  These episodes resolved quickly with SL NTG.  He only has these CP on non-dialysis days and never any CP following dialysis.  He is being dialyzed for > 3 hours as opposed to 2 hours like before.  Donald does think that the lasix is helping.  He has not had any similar CP as he did prompting ED visit.  He describes his CP as \"shoulder blade heaviness\"  Donald denies any palpitations or significant lightheadedness/near-syncope.          ASSESSMENT AND PLAN    CAD:  -s/p anterior MI and LAD/diagonal stenting (2015)  -repeat stenting to LAD/Diagonal due to in-stent restenosis (8/2016)  -recurrent hospitalizations for CP/mild NSTEMI.  Cath (2017 and 2018) showing patent stents and non-flow limiting diagonal and side branch disease  -s/p VAINTA to dRCA/PDA and rPLA (12/2019)   -recurrent HF and significant anemia with CP complicating picture.  -2 brief episodes of CP (only on non-dialysis days) since hospital discharge which were relieved with  NTG.  Pt feels status fairly stable  -Pt to call with worsening CP  -Plavix, ASA, beta-blocker, Imdur 120 mg    Severe Aortic Stenosis:  -ECHO (3/30/20) mean AV gradient 39 mmHg, TERI 0.84 cm2  -nodular densities noted in-between non-coronary and right cusps vs. Partial fusion of cusps  -blood cultures were negative  -prior mild/moderate aortic stenosis (10/2019)  -repeat ECHO planned in 2 weeks    ESRD:  -4x/week now with longer runs  (Target weight 86 kg)  -multiple admissions for HF requiring additional dialysis  - lasix 40mg started on non-dialysis days  -regular BMPs at dialysis  Pt states hgb staying at 8    Cardiomyopathy:  -LVEF 45-50% with inferolateral and global hypokinesia  -likely ischemic + non-ischemic (valvular and ESRD contributing)  -regular dialysis + PO lasix  -Coreg, Avapro    S/p PPM for symptomatic severe bradycardia " (5/2019)  -normal functioning device    HTN:  -on Coreg 12.5 mg / Avapro 300 mg / Imdur 120 mg / prn clonidine    Hyperlipidemia:  -on atorvastatin 40 mg.  Very low LDL    HIV +  -on anti-virals    IDDM:  -peripheral neuropathy      Pt will get ECHO in 2 weeks and will arrange telephone visit in 1 month. He is scheduled with Dr. Diaz in June 2020.      I have reviewed the note as documented above.  This accurately captures the substance of my conversation with the patient.      Phone call contact time  Call Started at 12:48 pm  Call Ended at 13:04 pm    DEDRICK Glaser, CNP  4/13/2020

## 2020-04-27 NOTE — Clinical Note
The DP pulses are 1+ bilaterally. The PT pulses are 1+ bilaterally. The radial pulses are 1+ bilaterally. The femoral pulses are 1+ bilaterally.

## 2020-04-28 NOTE — PROGRESS NOTES
Significantly improved clinically from this morning.  Much more alert now.  Dialysis should finish in about 10 minutes.  Troponin has risen to a new peak of 27.435.  Explained the risks and benefits of coronary angiography to the patient including the increased risk of stroke, heart attack and death, bleeding, bruising, hematoma formation.  I also explained the risk of allergy to the dye.  He has never had a rash or hives with the dye and the listed problem on the allergy list is infecting dye nephropathy not allergy.  I explained the risks of conscious sedation including aspiration and intubation and the risk associated blood transfusions.  I have also explained the special risk associated with coronary interventions including increased risk of death, myocardial infarction and emergency bypass surgery.  The patient with you understood why redoing the procedure.  He told me understood the risks associate with the procedure and finally told me that he wished to proceed.  His blood pressure still on the lower side so we will not introduce the beta-blockers just yet.  We are waiting the echocardiogram to be performed.  The patient has ruled out for COVID-19.

## 2020-04-28 NOTE — PROGRESS NOTES
I was contacted as the patient has a troponin of over 9 noted on his morning labs.  He presented with chest pain and a known history of severe aortic stenosis, but with a recent nuclear medicine stress test with no signs of inducible ischemia.  His ECG shows a chronic left bundle branch block, so it is difficult to interpret.  He is currently asymptomatic.  Given his recent nuclear medicine stress test showing no inducible ischemia, I am opting not to start anticoagulation.  Cardiology consultation requested.    Darin Foy MD  [facility] Intensivist Service

## 2020-04-28 NOTE — ED PROVIDER NOTES
History     Chief Complaint:  Shortness of Breath      HPI   Donald Raza is a 70 year old male former smoker with a history of chronic obstructive pulmonary disease, congestive heart failure, coronary artery disease, chronic kidney disease, TIA, colon cancer, hyperlipidemia, and hypertension, among others who is status post stent and pacemaker implant presents with shortness of breath. Per EMS report the patient went to dialysis this morning. At 2100 tonight he developed chest pain and shortness of breath.   Severe.  Similar to prior episodes requiring hostpialization.  Did go in for dialysis today.  Dialyzes TuThSa.  Had 3.5 hrs of dialysis today, which is normal for him.  No clear precipitant tonight.  States was just sitting and watching TV when his symptoms started.  Denies drug use tonight.  No cough, no fever.  No known sick contacts.      Allergies:  Calcium acetate   Contrast dye   Lisinopril  Sulfa     Medications:    Abacavir   Albuterol   Aspirin  Atorvastatin  Coreg   Cinacalcet   Clonidine   Clopidogrel  Dapsone  Prezista   Dolutegravir   Doxercal   Epogen  Lasix   Gabapentin   Novolog   Avapro  Zofran  Pantoprazole   Norvir   Velphoro  Ellipta     Past Medical History:    Allerigc rhinitis   Anemia due to chronic kidney disease   Coronary artery disease   Cataracts  Congestive heart failure   Chronic kidney disease   Colon cancer   Chronic obstructive pulmonary disease   Depression  Aortic root dilation  Diverticulitis  ESRD on dialysis  Gout  Hypertension   Hyperlipidemia   Hypertension   TIA   Diabetes   Chronic obstructive pulmonary disease  Angina at rest   Sepsis  Expressive aphasia  AIDS  Pulmonary hypertension   Huang disease     Past Surgical History:    Angiogram  Appendectomy   Cholecystectomy   Colostomy   EP pacemaker   Left heart catheterization  Stent coronary     Family History:    Brother: heart disease   Sister: kidney disease     Social History:  Smoking Status: Former smoker    Smokeless Tobacco: Never Used  Alcohol Use: Yes  Drug Use: No  PCP: Sukh Owen  Marital Status:       Review of Systems   Constitutional: Positive for diaphoresis. Negative for fever.   Respiratory: Positive for cough and shortness of breath.    Cardiovascular: Positive for chest pain.   Gastrointestinal: Positive for nausea. Negative for abdominal pain and vomiting.   All other systems reviewed and are negative.        Physical Exam     Patient Vitals for the past 24 hrs:   BP Temp Temp src Pulse Heart Rate Resp SpO2 Weight   04/28/20 0530 109/69 -- -- -- 67 -- 99 % 95.3 kg (210 lb 1.6 oz)   04/28/20 0500 104/68 -- -- -- 65 -- 99 % --   04/28/20 0430 105/70 -- -- -- 64 18 98 % --   04/28/20 0403 130/75 -- -- -- -- -- -- --   04/28/20 0400 -- 97.7  F (36.5  C) Axillary -- -- -- -- --   04/28/20 0351 (!) 151/88 -- -- -- -- -- -- --   04/28/20 0345 -- -- -- -- 85 21 96 % --   04/28/20 0250 (!) 150/88 -- -- 85 89 -- 100 % --   04/28/20 0245 (!) 150/84 -- -- 86 85 21 100 % --   04/28/20 0240 (!) 149/85 -- -- 86 88 -- 100 % --   04/28/20 0230 (!) 146/91 -- -- 92 90 17 100 % --   04/28/20 0220 135/81 -- -- 91 90 15 100 % --   04/28/20 0210 (!) 146/84 -- -- 91 93 19 99 % --   04/28/20 0200 (!) 144/75 -- -- 93 94 29 97 % --   04/28/20 0150 (!) 145/80 -- -- 96 94 -- -- --   04/28/20 0140 137/77 -- -- 93 93 30 100 % --   04/28/20 0130 132/81 -- -- 97 96 -- 99 % --   04/28/20 0120 123/81 -- -- 97 95 (!) 31 91 % --   04/28/20 0110 131/73 -- -- 94 92 -- 100 % --   04/28/20 0100 131/78 -- -- 94 94 -- 98 % --   04/28/20 0050 133/79 -- -- 97 98 23 99 % --   04/28/20 0040 122/78 -- -- 100 101 23 97 % --   04/28/20 0030 122/85 -- -- 102 101 16 94 % --   04/28/20 0020 132/87 -- -- 106 105 28 -- --   04/28/20 0010 121/79 -- -- 100 101 24 -- --   04/28/20 0000 131/71 -- -- 106 105 26 -- --   04/27/20 2350 (!) 144/71 -- -- 107 110 -- -- --   04/27/20 2345 (!) 136/95 -- -- 110 112 25 92 % --   04/27/20 2340 (!) 148/102 --  -- 110 112 23 93 % --   04/27/20 2330 (!) 143/112 -- -- -- 114 -- -- --   04/27/20 2321 -- -- -- -- -- -- -- 92.7 kg (204 lb 5.9 oz)   04/27/20 2320 (!) 153/103 -- -- -- 112 30 94 % --   04/27/20 2315 (!) 170/108 -- -- -- 109 26 93 % --   04/27/20 2301 -- -- -- 104 -- 26 100 % --   04/27/20 2257 (!) 161/99 -- -- -- 110 28 100 % --       Physical Exam    I have reviewed the triage vital signs    Constitutional: Appears stated age  Eyes: No discharge, symmetrical lids  ENT: Moist mucous membranes, no ear discharge  Neck: Full range of motion  Respiratory: Diffuse crackles in all lung fields.  Tachypneic.  Accessory muscle usage.  Cardiovascular: Tachycardic, LUE fistula present  Chest: Equal rise  Gastrointestinal: Soft. Nondistended. NTTP. No rebound or guarding  Musculoskeletal: No gross deformities.   Skin: Warm and well perfused. Pallorous. No visible rash.  Neurologic: Moves all extremities, speech fluent without dysarthria  Psychiatric: Appropriate affect, alert and interactive      Emergency Department Course     Imaging:  Radiology findings were communicated with the patient who voiced understanding of the findings.    XR Chest Port 1 View  Progressive or recurrent changes of CHF with prominent perihilar opacity, interstitial edema, and peribronchial cuffing now marked. No pleural effusion or pneumothorax. Mild cardiomegaly. Stable 2-lead right chest wall pacer.  Reading per radiology    Laboratory:  Laboratory findings were communicated with the patient who voiced understanding of the findings.    Blood gas venous (2350) : pH 7.22 (L), PCO2 65 (H) o/w WNL FIO2 60% bipap   Blood gas venous (0038): pH 7.22 (L), PCO2 62 (H) o/w WNL   Blood gas venous (0227): pH 7.25 (L), PCO2 64 (H), o/w WNL FIO2 100%    CBC: WBC 14.8 (H), HGB 11.1 (L),   CMP: na 127 (L), chloride 90 (L), bun 48 (H), GFR 8 (L), alkphos 169 (H), glucose 365 (H) o/w WNL (Creatinine 6.41 (H))  Troponin (Collected 0089): 0.072 (H)    Magnesium: 2.4 (H)   BNP: 59,540 (H)     COVID-19: pending     Procedures     Central Line Placement     Procedure:  Central Line Placement with Ultrasound Guidance.    Indications: Vascular access    Consent:  Risks (including but not limited to: pneumothorax,bleeding, infection or artery puncture), benefits and alternatives were discussed with  patient and consent for procedure was obtained.    Timeout:  Universal protocol was followed. TIME OUT conducted just prior to starting procedure confirmed patient identity, site/side, procedure, patient position, and availability of correct equipment and implants.?  Yes    Procedure note:  Right Femoral and the right femoral area was prepped, cleansed and draped in a sterile fashion.  Mask, gown and gloves were used per sterile protocol.  Patient was then placed into Trendelenburg position and lidocaine was used for local anesthesia.  Landmarks were identified and Vascular probe with ultrasound was used in a sterile fashion for guidence.  Introducer needle was then used to gain access to the central venous circulation.  Using Seldinger technique the  Triple lumen catheter was placed.  Catheter port(s) were aspirated and flushed.  Central line was sutured in place and sterile dressing applied.   Appropriate placement was confirmed by x-ray and no pneumothorax was visualized.    Patient Status:  Patient tolerated the procedure well.  There were no complications.            Interventions:  2326 Nitrostat 0.4 mg sublingual  2330 Nitrostat 0.4 mg sublingual   0218 Lasix 80 mg IV     Emergency Department Course:  Nursing notes and vitals reviewed.    2304 I performed an exam of the patient as documented above.     The patient was sent for a Chest XR while in the emergency department, results above.     IV was inserted and blood was drawn for laboratory testing, results above.    The patient was swabbed for COVID-19 testing, results pending.     1407 I spoke with Dr. Worthy of  the Hospitalist service from [facility] regarding patient's presentation, findings, and plan of care.    Findings and plan explained to the Patient who consents to admission. Discussed the patient with Dr. Worthy, who will admit the patient to a ICU bed for further monitoring, evaluation, and treatment.    Impression & Plan      Medical Decision Making:  Donald Raza is a 70 year old male who presents to the emergency department today for evaluation of SOB.  DDx includes but is not limited to hypervolemia, aortic stenosis, ESRD, hyperkalemia, respiratory failure.  Pt initially hypoxic, with increased WOB.  Nauseous; initially concerned about pt's ability to tolerate BiPAP therefore initially with plan for intubation; discussed with pt regarding his wishes regarding intubation and per him, would be OK with it if reversible cause was present, such as hypervolemia.  However, other critical patients arrived to the ED contemporaneously.  Therefore, plan changed to close monitoring with serial VBGs.  Pt's VBGs did not worsen.  Pt's vital signs improved.  WOB also improved.  Ultimately, with clinical improvement, decision to intubate was deferred.  Plan to admit to ICU for further treatment.  Discussed with nephrologist Dr. Pierre on call regarding possible need for emergent dialysis overnight; given clinical improvement, plan made to dialyze first thing in am.        Critical Care Note:    Organ systems at risk for life threatening failure: Respiratory  Associated problems: Hypercarbia/Hypercapnia, pulmonary edema  Critical Interventions: nitroglycerin SL, BiPAP    Total Time: 45 minutes (excluding separately billed procedures)     Includes: Bedside management, Case discussion related to critical care, Documentation, Multiple re-evaluations, Record review, and Test review. Additionally, this includes CXR Interpretation, BiPAP management and titration        Covid-19  Donald Raza was evaluated during a global  COVID-19 pandemic, which necessitated consideration that the patient might be at risk for infection with the SARS-CoV-2 virus that causes COVID-19.   Applicable protocols for evaluation were followed during the patient's care.    Diagnosis:    ICD-10-CM    1. Acute respiratory failure with hypoxia and hypercarbia (H)  J96.01 Troponin I    J96.02    2. ESRD (end stage renal disease) on dialysis (H)  N18.6     Z99.2    3. Aortic valve stenosis, etiology of cardiac valve disease unspecified  I35.0    4. Acute pulmonary edema (H)  J81.0        Disposition:   The patient is admitted into the care of Dr. Worthy.    Scribe Disclosure:  IJud, am serving as a scribe at 10:55 PM on 4/27/2020 to document services personally performed by Pelon James MD based on my observations and the provider's statements to me.      [facility] EMERGENCY DEPARTMENT       Pelon James MD  04/28/20 [id number]       Pelon James MD  04/28/20 0600

## 2020-04-28 NOTE — PROGRESS NOTES
Potassium   Date Value Ref Range Status   04/28/2020 5.0 3.4 - 5.3 mmol/L Final     Hemoglobin   Date Value Ref Range Status   04/28/2020 10.1 (L) 13.3 - 17.7 g/dL Final     Creatinine   Date Value Ref Range Status   04/28/2020 6.92 (H) 0.66 - 1.25 mg/dL Final     Urea Nitrogen   Date Value Ref Range Status   04/28/2020 53 (H) 7 - 30 mg/dL Final     Sodium   Date Value Ref Range Status   04/28/2020 129 (L) 133 - 144 mmol/L Final     INR   Date Value Ref Range Status   03/16/2020 1.08 0.86 - 1.14 Final       DIALYSIS PROCEDURE NOTE  Hepatitis status of previous patient on machine log was checked and verified ok to use with this patients hepatitis status.  Patient dialyzed for 3 hrs. on a 2 K bath with a net fluid removal of  2.8 L.  A BFR of 450 ml/min was obtained via a FLA using 15 gauge needles.    The treatment plan was discussed with Dr. Marcial during the treatment.  Total heparin received during the treatment: 0 units.   Needle cannulation sites held x 7 min, pressure dressings applied after hemostasis.    Meds  given: none Complications: none       ICEBOAT? Timeout performed pre-treatment  I: Patient was identified using 2 identifiers  C: Consent obtained/verified current before treatment  E: Equipment preventative maintenance is current and dialysis delivery system OK to use  B: Hepatitis B Surface Antigen: negative; Draw Date: 10/29/20      Hepatitis B Surface Antibody: immune; Draw Date: 11/21/19  O: Dialysis orders present and complete prior to treatment  A: Vascular access verified and assessed prior to treatment  T: Treatment was performed at a clinically appropriate time  ?: Patient was allowed to ask questions and address concerns prior to treatment  See flowsheet in EPIC for further details and post assessment.  Machine water alarm in place and functioning. Transducer pods intact and checked every 15min.  Pt dialyzed in his ICU room  Report received from: ROBERT Herring RN  Report given to: Rivera  RN  Chlorine/Chloramine water system checked every 4 hours.  Outpatient Dialysis at [facility]

## 2020-04-28 NOTE — CONSULTS
Consult Date:  04/28/2020      CARDIOLOGY CONSULTATION      REFERRING PHYSICIAN:  Hospitalist Service.      INDICATION FOR CARDIAC CONSULTATION:  Non-ST elevation myocardial infarct and chest pain.      HISTORY OF PRESENT ILLNESS:  It is my pleasure to see your patient, Donald Raza.  He is a 70-year-old patient who is well known to our group.  He is followed by Dr. Arnoldo Diaz and Yuki Hinojosa NP, in our clinic.  This patient has a long cardiovascular history.  This patient had stenting of the left anterior descending artery and of the diagonal artery.  This was first performed in 2015 in the setting of a myocardial infarct.  He underwent stenting of the ostial diagonal artery and intervention due to in-stent restenosis in 08/2016.  In 2017, 2018, he was admitted with chest discomfort showing patent stents and non-flow-limiting disease.  Most recently in 12/2019, which is approximately 4 months ago, he was admitted to [facility] for chest discomfort and underwent stenting of the distal right coronary artery and posterolateral artery and also stenting to the right posterior descending artery.  The LAD and diagonal artery stents were patent.  This patient also has a history of aortic stenosis.  There has been some discrepancy in the inconsistency in the valve areas.  On 03/03 of this year, the aortic valve area was felt to be 1.2 square cm with a mean gradient of 34.9 mmHg across the aortic valve with a maximum instantaneous gradient of 61 mmHg.  Four weeks later, the echocardiogram was repeated for recurrent chest discomfort.  The mean gradient across the aortic valve was 39 mmHg and the maximum instantaneous gradient was 61 mmHg which are virtually identical gradients, but the calculated valve area was now 0.95, indicating more severe aortic stenosis.  Certainly, it would be very unusual, if not impossible, to go from a valve area of 1.2 to a valve area of 0.9 in the space of 4 weeks.  Last nuclear  stress test showed no evidence of ischemia.  This was performed on 03/17 of this year.  The patient has a history of high-grade conduction system disease for which he has a dual-chamber pacemaker implanted.  He has end-stage renal disease, on dialysis and he also is HIV positive which is under control with antiretroviral treatment.      With that background in mind, the patient was admitted in the early hours of this morning complaining of acute onset of chest discomfort and shortness of breath associated with emesis.  He felt this morning that he felt generally unwell with fevers and diaphoresis.  He has been ruled out for COVID-19.  The results are not back yet.  The patient this morning is quite somnolent and unable to give a good history.  We have to keep on waking him up to ask questions.  The patient says that he has no chest discomfort now.  He feels that his breathing is significantly better.  The first troponin on admission was 0.072, the second troponin was 9.660 and the third troponin was 13.725.  The patient was significantly hypoxic and started on BiPAP.  Chest x-ray showed evidence of progressive recurrent congestive heart failure with prominent perihilar opacities and interstitial edema and peribronchial cuffing.  The patient is being dialyzed as we speak.      The 12-lead electrocardiogram at the time of admission showed that the patient was in sinus rhythm.  He had spiked T-waves.  Evidence of a left bundle branch block.  EKG 5 hours later showed that the patient is in sinus rhythm.  Left bundle branch block is present.  Heart rate is significantly improved.      PAST MEDICAL HISTORY:   1.  Coronary artery disease as described above.   2.  End-stage renal disease, on hemodialysis.   3.  Human immunodeficiency virus positive.   4.  Type 2 diabetes mellitus.     5.  Dyslipidemia.   6.  Transient ischemic attack.   7.  Essential hypertension.   8.  Chronic anemia.   9.  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   10.  Left bundle branch block.   11.  Aortic stenosis as described above.      FAMILY HISTORY:  Positive for coronary artery disease.      SOCIAL HISTORY:  Does not smoke.  Drinks alcohol occasionally.      MEDICATIONS:   1.  Aspirin 325 mg per day.   2.  Atorvastatin 40 mg at bedtime.   3.  Furosemide 80 mg intravenously administered once.   4.  Heparin bolus plus infusion.   5.  Insulin sliding scale.   6.  Pantoprazole 40 mg per day.      REVIEW OF SYSTEMS:   CONSTITUTIONAL:  Tiredness and is very somnolent.   EYES:  Negative.   ENT:  Negative.   CARDIOVASCULAR:  As above.   RESPIRATORY:  As above.   GASTROINTESTINAL:  Nil.   GENITOURINARY:  Dialysis.   NEUROLOGICAL:  Somnolent.   PSYCHIATRIC:  Nil.   ENDOCRINE:  Nil.   ENDOCRINE:  Diabetes mellitus.   HEMATOLYMPHATIC:  Anemia.      ALLERGY/IMMUNOLOGY:  CALCIUM ACETATE causes chest discomfort, CONTRAST, nephropathy, LISINOPRIL not specified, SULFA DRUGS, not specified.      PHYSICAL EXAMINATION:   GENERAL:  He is a moderately obese man.  He is on BiPAP.  He is very somnolent.   VITAL SIGNS:  His pulse rate is 72 beats per minute.  His blood pressure is 101/65, respirations are 18, O2 sats are 97%.  He is on BiPAP.  Temperature is 98.7 degrees Fahrenheit.   HEENT:  He has normal facial symmetry.  His pupils are equal.  He is wearing spectacles.  Jugular venous pulse cannot be assessed as he is lying flat, carotids reveal the transmitted murmur of aortic stenosis.  Examination of the chest reveals a pacemaker generator in the left infraclavicular region.   HEART:  [place] beat is impalpable.  Heart sound 1 is normal, heart sound 2 is soft and there is a 2/6 systolic ejection murmur heard mainly in the aortic area radiating towards the carotids.   CHEST:  Clear but diminished breath sounds at both bases.   ABDOMEN:  Reveals moderate truncal obesity, otherwise no masses or tenderness or organomegaly.   EXTREMITIES:   Pedal pulses are 1+ bilaterally.  No peripheral edema noted.   SKIN:  No obvious skin lesions noted.   NEUROLOGIC:  He moves all 4 limbs appropriately with no obvious sensory or motor loss.  As I mentioned above, he is quite somnolent, but he is oriented to time, place and person when he wakes.      LABORATORY INVESTIGATIONS:  Sodium is 129, potassium is 5.0, BUN is 53, creatinine 6.92, GFR is 7.  Troponins as described above.  Glucose 235, white cell count 9.8, hemoglobin 10.1, platelet count 141,000.  Awaiting COVID-19 virus PCR.      IMPRESSION:   1.  Chest discomfort and significant rise in troponin.  This is consistent with a non-ST elevation myocardial infarct in a patient with multiple interventions to multiple coronary arteries with the most recent intervention being in December to the distal right coronary artery, posterolateral artery and posterior descending artery.   2.  Evidence of congestive heart failure.  The patient is being dialyzed.   3.  Aortic stenosis.  It is unclear whether this is moderate or severe aortic stenosis.  The gradients were the same at the beginning of March as at the end of March, but the valve area was significantly different, varying between 1.2 and 0.8 of a square cm.   4.  End-stage renal disease, on dialysis.  The patient is being dialyzed to remove the excess fluid associated with pulmonary edema.   5.  Somnolence, unclear the cause of this.   6.  Anemia of chronic disease.   7.  Mild thrombocytopenia.   8.  Has been ruled out for COVID-19.   9.  Not receiving plavix which is necessary as he is only 4 months post RCA stenting.     PLAN:  The patient needs to be dialyzed first to treat his congestive heart failure.  This patient will require cardiac catheterization.  The timing of that will depend on how well he does with respect to removing fluid and improving his respiratory status.  If, of course, he develops recurrent chest pain, we have no option but to catheterize the  patient.  I will probably perform left and right heart catheterization as well to get a better idea of the aortic valve area.  Echocardiography has already been ordered this morning.  His blood pressure is on the lower side, so we cannot give beta blockers, but once the blood pressure improves, we will attempt to restart carvedilol in the setting of myocardial infarction. Restart plavix.     It has been my pleasure to be involved in the care of this complicated patient.      This was a 60-minute visit of which greater than 50% was involved in counseling and education in this critical care setting.  This was a critical care visit.         MAE STALEY MD, FACC             D: 2020   T: 2020   MT: JAYDEN      Name:     GREGORIO BRUSH   MRN:      [id number]        Account:       [id number]   :      1950           Consult Date:  2020      Document: [id number]

## 2020-04-28 NOTE — PROGRESS NOTES
A BiPAP of  18/6  @ 55% remains on the pt via the mask for an increase in WOB and/or SOB. Skin integrity is good. Pt is tolerating it well. Will continue to monitor and assess the pt's current respiratory status and needs.      Mendoza Croft, RT on 4/28/2020 at 5:17 AM

## 2020-04-28 NOTE — PROGRESS NOTES
Coronary angiography showed severe total occlusion of the posterior lateral artery.  Was felt by the interventional cardiologist that medical treatment would be best.  The posterior descending artery is by far the larger of the 2 vessels and this is wide open.  The stents in the left anterior descending artery and diagonal artery are also wide open.  The mean gradient across the aortic valve was 40 mmHg indicating severe aortic stenosis.  This will need to be addressed on an outpatient basis.  TAVR would be the most prudent means of intervention in this case.  Awaiting the results of the echocardiogram.  We will reintroduce the patient's medications as blood pressure improves.  Symptoms of congestive heart failure have significantly improved after dialysis.

## 2020-04-28 NOTE — PHARMACY-ADMISSION MEDICATION HISTORY
Admission medication history interview status for this patient is complete. See [facility] admission navigator for allergy information, prior to admission medications and immunization status.     Medication history interview done via telephone during Covid-19 pandemic, indicate source(s): Patient not interviewable. Will try again after cath lab procedure today  Medication history resources (including written lists, pill bottles, clinic record):[facility] list  Primary pharmacy:     Changes made to PTA medication list:  Added: -  Deleted: -  Changed: -    Actions taken by pharmacist (provider contacted, etc):None     Additional medication history information:None    Medication reconciliation/reorder completed by provider prior to medication history?  N   (Y/N)     For patients on insulin therapy: Y  (Y/N)  Will try to assess on 4/29      Prior to Admission medications    Medication Sig Last Dose Taking? Auth Provider   abacavir (ZIAGEN) 300 MG tablet Take 600 mg by mouth every evening   Yes Abstract, Provider   albuterol (PROAIR HFA/PROVENTIL HFA/VENTOLIN HFA) 108 (90 BASE) MCG/ACT Inhaler Inhale 2 puffs into the lungs every 6 hours as needed for shortness of breath / dyspnea or wheezing  Yes Nelson Worthy MD   aspirin 81 MG EC tablet Take 81 mg by mouth every evening  Yes Unknown, Entered By History   atorvastatin (LIPITOR) 40 MG tablet Take 40 mg by mouth At Bedtime  Yes Unknown, Entered By History   carvedilol (COREG) 12.5 MG tablet Take 1 tablet (12.5 mg) by mouth 2 times daily (with meals)  Yes Jovi Hayes MD   chlorhexidine (CHLORHEXIDINE) 0.12 % solution Rinse and gargle 15 ml by mouth twice a day as directed.  Yes Abstract, Provider   cinacalcet (SENSIPAR) 30 MG tablet Take 30 mg by mouth three times a week On dialysis days  Yes Unknown, Entered By History   CLONAZEPAM PO Take 0.5 mg by mouth nightly as needed for anxiety (restless legs)  Yes Unknown, Entered By History   cloNIDine (CATAPRES) 0.1 MG tablet  Take 0.1 mg by mouth daily as needed (for high blood pressure) If sbp > 180  Yes Unknown, Entered By History   clopidogrel (PLAVIX) 75 MG tablet Take 1 tablet (75 mg) by mouth daily  Yes Meek West MD   dapsone 100 MG tablet Take 100 mg by mouth At Bedtime   Yes Reported, Patient   Darunavir Ethanolate (PREZISTA PO) Take 800 mg by mouth At Bedtime.  Yes Reported, Patient   dolutegravir (TIVICAY) 50 MG tablet Take 50 mg by mouth At Bedtime  Yes Unknown, Entered By History   DOXERCALCIFEROL IV (given at dialysis)_  Yes Reported, Patient   epoetin brittni (EPOGEN,PROCRIT) 25088 UNIT/ML injection WITH DIALYSIS;  Yes Unknown, Entered By History   furosemide (LASIX) 40 MG tablet Take 1 tablet (40 mg) by mouth See Admin Instructions Take one tablet daily on non-dialysis days (Tuesday, Thursday, Saturday and Sunday)  Yes Meek West MD   gabapentin (NEURONTIN) 300 MG capsule Take 300 mg by mouth 2 times daily   Yes Unknown, Entered By History   imiquimod (ALDARA) 5 % cream Apply topically as needed  Yes Reported, Patient   insulin aspart (NOVOLOG FLEXPEN) 100 UNIT/ML pen Inject Subcutaneous 3 times daily (with meals) Take 2 units for every 50 BG above 150  Yes Unknown, Entered By History   ipratropium - albuterol 0.5 mg/2.5 mg/3 mL (DUONEB) 0.5-2.5 (3) MG/3ML neb solution Take 1 vial (3 mLs) by nebulization every 6 hours as needed for shortness of breath / dyspnea or wheezing  Yes Catrachito Rosado DO   irbesartan (AVAPRO) 300 MG tablet Take 1 tablet (300 mg) by mouth At Bedtime  Yes Clemencia Pal MD   Isosorbide Mononitrate  MG TB24 Take 1 tablet (120 mg) by mouth every evening  Yes Yuki Hinojosa, APRN CNP   ketoconazole (NIZORAL) 2 % cream Apply topically daily Between toes for fungal infection  Yes Reported, Patient   MAGNESIUM OXIDE PO Take 400 mg by mouth At Bedtime  Yes Unknown, Entered By History   melatonin 5 MG tablet Take 5 mg by mouth nightly as needed   Yes Reported, Patient  "  mirtazapine (REMERON) 15 MG tablet Take 15 mg by mouth At Bedtime  Yes Reported, Patient   nitroglycerin (NITROSTAT) 0.4 MG SL tablet One tablet under the tongue every 5 minutes if needed for chest pain. May repeat every 5 minutes for a maximum of 3 doses in 15 minutes\"  Yes Lay Paz MD   Nutritional Supplements (NEPRO PO) Take 1 Container by mouth 3 times daily 2-3 times daily  Yes Unknown, Entered By History   ondansetron (ZOFRAN-ODT) 4 MG ODT tab Take 4 mg by mouth every 8 hours as needed for nausea  Yes Unknown, Entered By History   pantoprazole (PROTONIX) 40 MG EC tablet Take 40 mg by mouth daily  Yes Unknown, Entered By History   polyethylene glycol (MIRALAX/GLYCOLAX) packet Take 1 packet by mouth daily as needed for constipation  Yes Reported, Patient   ritonavir (NORVIR) 100 MG capsule Take 1 capsule by mouth At Bedtime   Yes Reported, Patient   sucroferric oxyhydroxide (VELPHORO) 500 MG CHEW chewable tablet Take 500 mg by mouth 3 times daily (with meals)   Yes Unknown, Entered By History   umeclidinium (INCRUSE ELLIPTA) 62.5 MCG/INH inhaler Inhale 1 puff into the lungs daily  Yes Unknown, Entered By History        "

## 2020-04-28 NOTE — PLAN OF CARE
ICU End of Shift Summary.  For vital signs and complete assessments, please see documentation flowsheets.     Pertinent assessments: A&Ox4, LS clear/dim on 2-3L NC, afebrile, SR with 1st AVB and L BBB, denies chest pain but has occasional R shoulder pain but this is chronic for him, HOTN during dialysis, tolerating CLD this evening, anuric today and no BM  Major Shift Events: dialysis this morning with 2.8L off, angio this afternoon without intervention, heparin gtt stopped, has been off BiPAP since midway through dialysis  Plan (Upcoming Events): has echo ordered (to be done tomorrow?, message left with scheduling), dialysis tomorrow  Discharge/Transfer Needs: stable enough to transfer out of ICU    Bedside Shift Report Completed : y  Bedside Safety Check Completed: y

## 2020-04-28 NOTE — PROVIDER NOTIFICATION
04/28/20 0525   Significant Event   Significant Event Critical result/value  (tele ICU notified trop 9.660)

## 2020-04-28 NOTE — CONSULTS
RENAL CONSULTATION NOTE    REFERRING MD: Nelson Worthy MD     REASON FOR CONSULTATION:  ESRD with pulmonary edema    HPI:  70 y.o man with ESRD, HIV, CAD and moderate to severe aortic stenosis, who was admitted early this morning for shortness of breath from volume overload. Patient has had frequent hospital admissions for chest pain and shortness of breath. Per dialysis RN's report, he has had a hard time removing fluid with dialysis on Friday and Monday due to significant intra-dialytic hypotension. His EDW is 86 kg. Post HD weight on Friday was 87.4 kg and Monday was 89.4 kg. Again, fluid removal was limited by intradialytic hypotension. Patient is getting hemodialysis and he is able to pull net 3 liters today. He had hypotension at the beginning of treatment, but his BP came up. He say his breathing is much better now. He denies fever and cough. He denies chest pain.     ROS:  A complete review of systems was performed and is negative except as noted above.    PMH:    Past Medical History:   Diagnosis Date     Allergic rhinitis      Anemia due to chronic kidney disease 10/21/2011     CAD S/P percutaneous coronary angioplasty 06/15/2015     Cataract      CKD (chronic kidney disease)      Colon cancer      COPD      Depression      Dilated aortic root 05/06/2016     Diverticulitis      ESRD (end stage renal disease) on dialysis 05/06/2016     Gout      Human immunodeficiency virus (HIV) disease      Hx of squamous cell carcinoma 03/23/2007     Hypertension 2010     Impotence of organic origin      Increased prostate specific antigen (PSA) velocity 08/08/2016    Awaiting bx on blood thinner     Mixed hyperlipidemia      Pulmonary HTN     Mod     TIA (transient ischemic attack) 5/2016     Type 2 diabetes mellitus age 50       PSH:    Past Surgical History:   Procedure Laterality Date     ANGIOGRAM  03-04-16    No culprit lesions, stents widely patent      ANGIOGRAM  05-06-16    Cutting balloon ptca=Diag      APPENDECTOMY  2000     CHOLECYSTECTOMY, LAPOROSCOPIC       COLON SURGERY       COLOSTOMY  09/30/1999    Temporary for diverticulitis     EP PACEMAKER N/A 5/2/2019    Procedure: EP Pacemaker;  Surgeon: Angelique Perera MD;  Location:  HEART CARDIAC CATH LAB      LEFT HEART CATHETERIZATION  03/12/2018    No significant change  Elevated LVEDp  LVEF 30% No PCI     HEART CATH, ANGIOPLASTY  08-18-16    LAD PCI. Stented with a 3.0 x 8 mm Xience Alpine stent.     IR DIALYSIS FISTULOGRAM LEFT  1/25/2019     STENT, CORONARY, - VANITA=Diag, PTCA=LAD  12/2015     STENT, CORONARY, - VANITA=LAD  06/2015       MEDICATIONS:      aspirin  325 mg Oral Daily     atorvastatin  40 mg Oral At Bedtime     clopidogrel  300 mg Oral Once     [START ON 4/29/2020] clopidogrel  75 mg Oral Daily     insulin aspart  1-6 Units Subcutaneous Q4H     - MEDICATION INSTRUCTIONS -   Does not apply Once     pantoprazole  40 mg Oral Daily       ALLERGIES:    Allergies as of 04/27/2020 - Reviewed 04/14/2020   Allergen Reaction Noted     Calcium acetate Other (See Comments) 07/27/2017     Contrast dye Other (See Comments) 08/22/2014     Diagnostic x-ray materials Other (See Comments) 09/24/2012     Lisinopril  07/23/2012     Sulfa drugs  07/23/2012       FH:    Family History   Problem Relation Age of Onset     Heart Disease Brother 40        CABG     Kidney Disease Sister      Hypertension Sister      Heart Disease Brother         Dilated aorta       SH:    Social History     Socioeconomic History     Marital status:      Spouse name: Not on file     Number of children: Not on file     Years of education: Not on file     Highest education level: Not on file   Occupational History     Not on file   Social Needs     Financial resource strain: Not on file     Food insecurity     Worry: Not on file     Inability: Not on file     Transportation needs     Medical: Not on file     Non-medical: Not on file   Tobacco Use     Smoking status: Former  Smoker     Packs/day: 2.00     Years: 41.00     Pack years: 82.00     Types: Cigarettes     Last attempt to quit:      Years since quittin.3     Smokeless tobacco: Never Used   Substance and Sexual Activity     Alcohol use: No     Alcohol/week: 0.0 standard drinks     Drug use: No     Sexual activity: Not on file   Lifestyle     Physical activity     Days per week: Not on file     Minutes per session: Not on file     Stress: Not on file   Relationships     Social connections     Talks on phone: Not on file     Gets together: Not on file     Attends [employer] service: Not on file     Active member of club or organization: Not on file     Attends meetings of clubs or organizations: Not on file     Relationship status: Not on file     Intimate partner violence     Fear of current or ex partner: Not on file     Emotionally abused: Not on file     Physically abused: Not on file     Forced sexual activity: Not on file   Other Topics Concern     Parent/sibling w/ CABG, MI or angioplasty before 65F 55M? Yes      Service Not Asked     Blood Transfusions Not Asked     Caffeine Concern Yes     Comment: 2 cups daily     Occupational Exposure Not Asked     Hobby Hazards Not Asked     Sleep Concern Yes     Stress Concern Not Asked     Weight Concern Not Asked     Special Diet No     Comment:  more proteins     Back Care Not Asked     Exercise Yes     Comment: Cardiac rehab      Bike Helmet Not Asked     Seat Belt Not Asked     Self-Exams Not Asked   Social History Narrative     Not on file       PHYSICAL EXAM:    BP 92/46   Pulse 73   Temp 98.7  F (37.1  C) (Axillary)   Resp 23   Wt 95.3 kg (210 lb 1.6 oz)   SpO2 93%   BMI 29.30 kg/m      GENERAL:  NAD .   HEENT: EOMI. PERR. OMM. Wears eye glasses.   HEART:  RRR, + murmur. No GR.   CHEST:  + crackles and diminish.    ABDOMEN:  obese, soft, NT  EXTREMITIES:  Pedal pulses are 1+ bilaterally.  No peripheral edema noted.   SKIN:  No obvious skin lesions noted.    NEUROLOGIC:  He moves all 4 limbs appropriately with no obvious sensory or motor loss.  As I mentioned above, he is quite somnolent, but he is oriented to time, place and person when he wakes.   Psych: pleasant.     LABS:      CBC RESULTS:     Recent Labs   Lab 04/28/20  0555 04/27/20  2349   WBC 9.8 14.8*   RBC 3.69* 4.10*   HGB 10.1* 11.1*   HCT 34.7* 39.4*   * 247       BMP RESULTS:  Recent Labs   Lab 04/28/20  0555 04/27/20  2349   * 127*   POTASSIUM 5.0 4.8   CHLORIDE 94 90*   CO2 27 25   BUN 53* 48*   CR 6.92* 6.41*   * 365*   PRABHA 8.7 9.4       INRNo lab results found in last 7 days.     DIAGNOSTICS:  Reviewed    CXR: pulmonary edema.     A/P:  70 y.o man with ESRD, CAD, ICM with EF of 4-50% and moderate to severe aortic stenosis, admitted for volume overload,     #ESRD:    -MWF at [facility]    -3.15hrs, Qb 450, EDW 56 kg, LAVF   -Last two post HD weights 87.4 and 89.4, unable to optimize UF intra-dialytic hypotension.     # SOB from pulmonary edema: Improved with UF.     # NSTEMI in the setting of volume overload: Troponin continues to trend up   -mange per cardiology    # Moderate to severe aortic stenosis: Could this be contribute to intra-dialytic hypotension making it difficult to optimize UF with dialysis.    -manage per cardiology    # Severe CAD with EF of 45-50%.    # HIV on ART.     # CKD-MBD:    -in-center Sensipar 30 mg    -Hectorol 1 mcg    # Anemia:    -20K Epogen   -50 mcg weekly    Plan:  # HD again tomorrow  # Midodrine 10 mg before and mid run    Doyle Marcial MD  [facility] - Nephrology  Office Phone: [phone]  Pager: [phone]

## 2020-04-28 NOTE — PROGRESS NOTES
Cardiology consult dictated.  70-year-old patient with long history of coronary artery disease and moderate to moderate severe aortic stenosis admitted with recurrent chest discomfort and shortness of breath.  Significant troponin rise.  Evidence of pulmonary edema on chest x-ray.  End-stage renal disease on dialysis.  Patient is pain-free at present.  Patient is being dialyzed and breathing is improving.  Somnolent.  For coronary angiography during this admission.  If is more alert later on this afternoon coronary angiography can be performed.  Echocardiography has been repeated to further assess the aortic stenosis.  Blood pressures in the lower side but would benefit from beta-blockers once blood pressure improves.  Patient needs to be restarted on clopidogrel as he only had the stents placed in December.  Continue with aspirin.

## 2020-04-28 NOTE — CONSULTS
CTS identifies pt as high risk due to high MUSHTAQ. Pt with complex medical history of ESRD on HD, HIV, CAD and AV stenosis currently admitted w/ respiratory failure, pulmonary edema and chest pain. [place]ed [place] 60[address] on admission and Trop up to 27 at this time, planning for cardiac cath. Pt is well known to care transitions due to frequent hospitalizations, pt has had 12 ER visits and 12 hospital admissions in the past 1 year. Per chart review, pt resides at home with his wife and brother, wife and brother are very supportive of him. He attends HD MWF at [facility].     No handoff needed at this time as no gap in care identified.      CM will continue to follow patient for any additional discharge needs.     Radha Mayfield RN BSN   Inpatient Care Coordination  [facility]   [phone]

## 2020-04-28 NOTE — H&P
Admitted:     04/27/2020      CHIEF COMPLAINT:  Shortness of breath and chest pain.      HISTORY OF PRESENT ILLNESS:  Mr. Raza is a 70-year-old male well known to our Hospitalist Service and to this provider for multiple admissions in the past primarily revolving around chest pain, shortness of breath symptoms.  The patient has a complex medical history that includes end-stage renal disease on hemodialysis, HIV; stable on antiretroviral medications, coronary artery disease with previous PCI, and history of aortic valve stenosis.  The patient presents to the hospital today for acute onset of chest pain and shortness of breath symptoms.  He also had emesis.  He reports that this morning he was feeling a bit unwell and had some fevers and diaphoresis.  The patient had his usual dialysis today.  He reports that he got some fluid, although he cannot quantitate exactly how much.  This evening, the patient developed chest pain and worsening respiratory symptoms which prompted his appearance in the Emergency Department.  He also reports he had several episodes of emesis.  He thinks his emesis symptoms may have preceded his shortness of breath, although details a bit unclear here.  The patient is currently on BiPAP and is somewhat challenging historian currently.      The patient describes chest pain as a dullness or squeezing sensation.  Chest pain has improved since presentation to the ER, but is still present.      Denies any history of encounters with a coronavirus patients; although, he does leave the house to do dialysis 3 times a week.  Prior to today, he was otherwise feeling in his usual state of health.      MEDICAL HISTORY:  Notable for recent nuclear medicine stress test performed in 03/2020 that showed no evidence for ischemia and ejection fraction near 50%.  His most recent echocardiogram performed 03/2020 showed ejection fraction 45%-50% with concentric left ventricular hypertrophy.  Aortic valve gradient  calculated at 0.85 cm2 on that study and felt to be severe based on calculations.      On arrival to the ER this evening, vital signs included blood pressure 170/100 with a heart rate of 110,  temperature not yet been obtained in the ER.  The patient was initially placed on nonrebreather mask and then converted to BiPAP therapy.  He is currently on BiPAP in the ER.  VBG is being repeated and intubation is being contemplated pending results of ABG.  Plan is for admission to the Intensive Care Unit.      Lab work notable for BNP of 59,000, white cell count of 14, hemoglobin 11, platelet count that is normal.  Troponin is detectable at 0.07.  Sodium is 127.  Creatinine is 6.4 in this dialysis patient.  Liver function tests unremarkable.  VBG notable for pH 7.22 and pCO2 of 65.  Repeat VBG is pending.  COVID-19 testing is pending.  Chest x-ray was performed, which I personally reviewed, and appears to show interstitial infiltrates consistent with pulmonary edema.      EKG:  I personally reviewed, shows left bundle branch block which is consistent with previous.      Plan is for admission to the Intensive Care Unit.  Currently on BiPAP therapy.  If he worsens in the ER, intubation will be considered prior to admission to the ICU.      PAST MEDICAL HISTORY:   1.  End-stage renal disease on hemodialysis Monday, Wednesday and Friday.  Last dialyzed earlier today.   2.  History of HIV, stable on antiretroviral medications.   3.  Coronary artery disease with previous PCI.   4.  Diabetes mellitus type 2, maintained on insulin therapy.   5.  Hypertension history.  He has had previous appearances in the hospital for uncontrolled hypertension and symptoms are related to this.   6.  TIA.   7.  Dyslipidemia.   8.  Chronic anemia, baseline hemoglobin in the 8-9 range historically.   9.  Pacemaker history.   10.  Multiple hospitalizations in the past for atypical chest pain and severe uncontrolled hypertension, shortness of breath  symptoms.   11.  Frequent troponin elevation on lab reviews.  He frequently has detectable troponin on appearance in the hospital no matter why he is here.     12.  Recent nuclear medicine stress test as noted above in 03/2020 showing no inducible ischemia with ejection fraction near 50%.   13.  Recent echocardiogram as noted above in 03/2020 showing ejection fraction 45%-50% with severe aortic stenosis with calculated aortic valve area of 0.85 cm2.  Valve area was noted to be 1.3 cm2 on echocardiogram from 10/2019.      CURRENT MEDICATIONS:  Await pharmacy reconciliation medication list.  I do note the patient takes Lasix on nondialysis days.      ALLERGIES:  INCLUDE CALCIUM ACETATE, CONTRAST DYE, LISINOPRIL, SULFA.  I BELIEVE HIS CONTRAST DYE ALLERGY IS BECAUSE OF CONTRAST NEPHROPATHY BASED ON CHART REVIEW.      FAMILY HISTORY:  Reviewed.  Nothing contributory to this admission.      SOCIAL HISTORY:  The patient denies smoking.  Social alcohol use.  He is a full code.      REVIEW OF SYSTEMS:  See HPI for details.  Comprehensive greater than 10-point review of systems is otherwise negative besides that detailed above.      PHYSICAL EXAMINATION:   VITAL SIGNS:  Blood pressure is currently 136/95 with heart rate of 112.  Saturations 92% on BiPAP.   Nitroglycerin drip has been ordered in the ER.  Temperature has not yet been obtained.   GENERAL:  The patient is currently on BiPAP.  He is diaphoretic.  He is able to answer questions for me.  He understands the questions being asked and is able to give appropriate answers.  A bit challenging to communicate with him given the BiPAP mask and my need to wear a mask and PPE for COVID rule out.   HEENT:  Head is atraumatic.  Sclerae are white.  Eyelids are normal.  Conjunctivae are normal.  Extraocular movements are intact.   NECK:  Supple.  No cervical or supraclavicular lymphadenopathy.   HEART:  Regular rate and rhythm.  No significant murmurs.  No lower extremity edema.    LUNGS:  Notable for crackles at the bases bilaterally.  No wheezing.  No intercostal retractions.  Appears stable on BiPAP currently.   ABDOMEN:  Nontender, nondistended.  Soft.  No masses.  No organomegaly.   EXTREMITIES:  No edema.   SKIN:  Reveals no rashes.  No jaundice.  Skin is dry to touch.   NEUROLOGIC:  Cranial nerves II-XII are intact.  Appears to be intact.  Moves all extremities appropriately.  Sensation intact to light touch in the upper and lower extremities bilaterally.   PSYCHIATRIC:  The patient is awake, alert, appears to be oriented.  He appears to recognize me from previous admissions to the hospital.  Able to answer questions appropriately.      LABORATORY AND IMAGING DATA:  Reviewed above in HPI.  Chest x-ray and EKG personally reviewed as above.      IMPRESSION AND PLAN:  Mr. Raza is a 70-year-old male with a history of end-stage renal disease on hemodialysis 3 times a week.  Last dialyzed this morning.  He also has a history of HIV, well controlled on antiretroviral medications, diabetes mellitus, and severe aortic stenosis.  Additional history includes pacemaker and hypertension.  He presented to the hospital today for concerns about diaphoresis, vomiting, chest pain, and shortness of breath.  The patient was found to have acute hypoxic respiratory failure on presentation to the Emergency Department.  He is currently on BiPAP.  Workup in the ER notable for a chest x-ray, which appears to show interstitial infiltrates, likely pulmonary edema.  BNP is markedly elevated at 59,000.  White cell count is modestly elevated at 15 and troponin is slightly detectable at 0.07.  VBG is being repeated in the ER.  The patient is not showing clinical improvement.  Intubation is being considered.  The patient will be admitted to the ICU, either on BiPAP or intubated pending repeat VBG and further ER evaluation.     1.  Acute hypoxic respiratory failure:  At this point, suspect pulmonary edema is likely  cause here.  Ruling out for COVID-19 coronavirus.  The patient did report emesis, so also need to consider possibility of aspiration pneumonitis, although no focal infiltrate on chest x-ray.  Temperature is yet to be obtained in the ER.  Would consider initiating as Zosyn for anaerobic coverage if he develops a fever.   2.  Suspected pulmonary edema:  Continue nitroglycerin drip as started in the ER.   3.  Chest pain:  Complete the troponin rule out.  Of note, the patient routinely has positive troponins on appearance in the hospital.  Unless troponin dramatically elevates low suspicion for acute coronary syndrome here, especially given the recent nuclear medicine stress test showing no ischemia in March of this year.   4.  Severe aortic stenosis:  Possible this is a contributing factor to his symptoms.  Consider Cardiology consult depending on clinical status and response to therapy.   5.  Diabetes mellitus, maintained on insulin.   6.  History of pacemaker.   7.  History of HIV, well controlled on antiretroviral medications.   6.  Coronary artery disease history.      PLAN:   1.  Admit to Intensive Care Unit.   2.  Nephrology consultation for dialysis.   3.  We will attempt a trial of intravenous Lasix to see if he could produce some urine.  It appears he takes Lasix at home on nondialysis days.   4.  COVID-19 rule out is pending.  For now, continue isolation until a result returns.   5.  Complete troponin, rule out.   6.  Consider Cardiology input in the setting of his severe aortic stenosis.  This may be a contributing factor to his current presentation.  It appears the stenosis has worsened based on most recent echocardiogram from March of this year.   7.  We will hold off on repeat echocardiogram for now, would consider if the troponin significantly elevates.   8.  Consider starting Zosyn.  If the patient is febrile, given a report of emesis earlier today as aspiration pneumonitis would be a possibility for  his acute symptoms as well.   9.  The patient is full code as I discussed with him on admission.   10.  Home medications will need to be reordered when clarified by pharmacy later today.          AMY VERMA MD             D: 2020   T: 2020   MT: JOHN      Name:     GREGORIO BRUSH   MRN:      [id number]        Account:      [id number]   :      1950        Admitted:     2020                   Document: [id number]

## 2020-04-28 NOTE — PROGRESS NOTES
"RT Note    A BiPAP of  16/8 @ 30% was applied to the pt via the mask for an increase /in WOB and/or SOB.  The bridge of the nose skin integrity looks good. Pt is tolerating it well. Will continue to monitor and assess the pt's current respiratory status and needs.      Vital signs:      BP: (!) 161/99 Pulse: 104 Heart Rate: 110 Resp: 26 SpO2: 100 % O2 Device: BiPAP/CPAP Oxygen Delivery: 15 LPM      Estimated body mass index is 26.69 kg/m  as calculated from the following:    Height as of 3/30/20: 1.803 m (5' 11\").    Weight as of 4/1/20: 86.8 kg (191 lb 5.8 oz).      Lolis Bianchi, RRT    "

## 2020-04-28 NOTE — PROGRESS NOTES
Full consult note to follow.   D/w Dr James    Pt known to our service. Frequent admissions under similar setting.   Had HD yesterday per his MWF schedule. Now in with chest pain, dyspnea and volume overload on CXR requiring BiPaP and several kg above his previous TW of 86 kg.. Saturating at 100%    Plan -   Agree with IV Lasix and NTG  Will arrange for dialysis over next hours with plan to remove 4-5 L fluid. HD again tomorrow per his MWF schedule.     Dr Marcial will follow in morning.     Maricel Pierre MD  [facility]   [phone]

## 2020-04-28 NOTE — PRE-PROCEDURE
GENERAL PRE-PROCEDURE:   Procedure:  Heart catheterization poss intervention  Date/Time:  4/28/2020 1:20 PM    Written consent obtained?: Yes    Consent given by:  Patient  Patient states understanding of procedure being performed: Yes    Patient's understanding of procedure matches consent: Yes    Procedure consent matches procedure scheduled: Yes    Expected level of sedation:  Moderate  ASA Class:  Class 3- Severe systemic disease, definite functional limitations  Mallampati  :  Grade 2- soft palate, base of uvula, tonsillar pillars, and portion of posterior pharyngeal wall visible

## 2020-04-28 NOTE — H&P
Dictated    I contacted tele hub regarding this ICU admission      Addendum:   follow up troponin now significantly elevated to 9 (0.07 earlier).  Will start IV heparin gtt and order TTE.  Continue to trend troponin.  Will place cardiology consult.  As BP soft would hold off on ntg gtt for now and hold off on b-blocker until able to dialyze for fluid removal in setting of acute CHF exacerbation

## 2020-04-28 NOTE — PROGRESS NOTES
Brief Hospitalist Update:    Patient admitted overnight with chest pain and congestive heart failure symptoms.  This morning he was chest pain-free but troponin continued to rise to 27.435.  He tolerated dialysis and symptoms improved with that.  He was taken for coronary angiogram by cardiology which reportedly showed severe total occlusion of the posterior lateral artery.  Medical management has been recommended.  Echocardiogram is still pending regarding his aortic valve.  --Continue medical management, will continue heparin for now but defer if cardiology feels this can be stopped.    Sukh Bang MD

## 2020-04-28 NOTE — ED NOTES
Bed: ED03  Expected date: 4/27/20  Expected time: 10:38 PM  Means of arrival: Ambulance  Comments:  A 597 72 SOB/hypoxia

## 2020-04-28 NOTE — PLAN OF CARE
ICU End of Shift Summary.  For vital signs and complete assessments, please see documentation flowsheets.     Pertinent assessments: Pt is A&Ox4 but lethargic, tele SR w BBB and AVB, LS crackles in the bases, BS +, olguria at baseline per pt d/t HD, skin intact  Major Shift Events: admit ~0400 on bipap sating high 90's, consistently elevating trops- last at 13.725- md aware EKG obtained and cards consulted, pt asymptomatic,   Plan (Upcoming Events): HD today, cards consult possible echo, covid pending  Discharge/Transfer Needs: TBD    Bedside Shift Report Completed : Y  Bedside Safety Check Completed: Y

## 2020-04-28 NOTE — ED TRIAGE NOTES
Pt arrives via EMS with SOB and chest pain. Received dialysis this morning, SOB and chest pain began around 9pm. Hx of CHF. A&O, ABC's intact.

## 2020-04-29 NOTE — PROGRESS NOTES
Renal Medicine Progress Note            Assessment/Plan:     70 y.o man with ESRD, CAD, ICM with EF of 45-50% and moderate to severe aortic stenosis, admitted for volume overload,      #ESRD:                -MWF at [facility]                -3.15hrs, Qb 450, EDW 56 kg, LAVF               -Last two post HD weights 87.4 and 89.4, unable to optimize UF intra-dialytic hypotension.      # SOB from pulmonary edema: Improved with UF.      # NSTEMI: LHC showed  DEANNA stent. Medical management was recommended.                -mange per cardiology     # Severe aortic stenosis: Likely contributes to intermittent intra-dialytic hypotension making fluid removal difficult in the outpatient setting.                -manage per cardiology     # Severe CAD with EF of 45-50%.     # HIV on ART.      # CKD-MBD:                -in-center Sensipar 30 mg                -Hectorol 1 mcg     # Anemia:                -20K Epogen               -50 mcg weekly     Plan:  # UF neg 3 liters with HD today. He tolerates it well.   # Next HD treatment on Friday if he remains hospitalized.         Interval History:     Patient feels better. No chest pain. No N/V.            Medications and Allergies:       - MEDICATION INSTRUCTIONS for Dialysis Patients -   Does not apply See Admin Instructions     abacavir  600 mg Oral QPM     aspirin  325 mg Oral Daily     atorvastatin  40 mg Oral At Bedtime     carvedilol  6.25 mg Oral BID w/meals     clopidogrel  75 mg Oral Daily     dapsone  100 mg Oral At Bedtime     darunavir  800 mg Oral At Bedtime     dolutegravir  50 mg Oral At Bedtime     [START ON 4/30/2020] furosemide  40 mg Oral Once per day on Sun Tue Thu Sat     gabapentin  300 mg Oral BID     insulin aspart  1-7 Units Subcutaneous TID AC     insulin aspart  1-5 Units Subcutaneous At Bedtime     isosorbide mononitrate  30 mg Oral Daily     mirtazapine  15 mg Oral At Bedtime     pantoprazole  40 mg Oral Daily     ritonavir  100 mg Oral At Bedtime      sucroferric oxyhydroxide  500 mg Oral TID w/meals     umeclidinium  1 puff Inhalation Daily        Allergies   Allergen Reactions     Calcium Acetate Other (See Comments)     Other reaction(s): Other, see comments  Pain in chest and back  Pain in chest area (sensitive skin)      Contrast Dye Other (See Comments)     Contrast nephropathy     Diagnostic X-Ray Materials Other (See Comments)     PN: renal failure     Lisinopril      Sulfa Drugs             Physical Exam:   Vitals were reviewed  Heart Rate: 86, Blood pressure 101/61, pulse 85, temperature 98  F (36.7  C), temperature source Oral, resp. rate 14, weight 87.4 kg (192 lb 9.6 oz), SpO2 97 %.    Wt Readings from Last 3 Encounters:   04/29/20 87.4 kg (192 lb 9.6 oz)   04/01/20 86.8 kg (191 lb 5.8 oz)   03/16/20 90.4 kg (199 lb 6.4 oz)       Intake/Output Summary (Last 24 hours) at 4/29/2020 1405  Last data filed at 4/29/2020 1200  Gross per 24 hour   Intake 428.37 ml   Output 3000 ml   Net -2571.63 ml     GENERAL:  NAD .   HEENT: EOMI. PERR. OMM. Wears eye glasses.   HEART:  RRR, + murmur. No GR.   CHEST:  + crackles and diminish.    ABDOMEN:  obese, soft, NT  EXTREMITIES:  Pedal pulses are 1+ bilaterally.  No peripheral edema noted.   SKIN:  No obvious skin lesions noted.   NEUROLOGIC:  He moves all 4 limbs appropriately with no obvious sensory or motor loss.  As I mentioned above, he is quite somnolent, but he is oriented to time, place and person when he wakes.   Psych: pleasant.          Data:     CBC RESULTS:     Recent Labs   Lab 04/28/20  0555 04/27/20  2349   WBC 9.8 14.8*   RBC 3.69* 4.10*   HGB 10.1* 11.1*   HCT 34.7* 39.4*   * 247       Basic Metabolic Panel:  Recent Labs   Lab 04/29/20  0640 04/28/20  0555 04/27/20  2349   * 129* 127*   POTASSIUM 4.4 5.0 4.8   CHLORIDE 96 94 90*   CO2 30 27 25   BUN 33* 53* 48*   CR 5.64* 6.92* 6.41*   * 235* 365*   PRABHA 9.3 8.7 9.4       INRNo lab results found in last 7 days.   Attestation:   I  have reviewed today's relevant vital signs, notes, medications, labs and imaging.    Doyle Marcial MD  [facility] - Nephrology  Office phone :[phone]  Pager: [phone]

## 2020-04-29 NOTE — PLAN OF CARE
ICU End of Shift Summary.  For vital signs and complete assessments, please see documentation flowsheets.     Pertinent assessments: A&Ox4, SR with 1st AVB and BBB, afebrile, denies pain, BP stable, anuric, large BM x2, up to chair with assist x1, fair appetite for dialysis diet and drinks some of nepro shakes  Major Shift Events: CVC removed, advanced diet, dialysis and echo done this morning  Plan (Upcoming Events): transfer to tele floor, wean O2 off as carmen  Discharge/Transfer Needs: home 1-2 days if off O2 and stable    Bedside Shift Report Completed : y  Bedside Safety Check Completed: y

## 2020-04-29 NOTE — PROGRESS NOTES
[facility]  Hospitalist Progress Note  Clemencia Pal MD 04/29/20  Text Page  Pager: [phone] (7am-6pm)    Reason for Stay (Diagnosis): CP, SOB         Assessment and Plan:      Summary of Stay: Donald Raza is a 70 year old male with past medical history of ESRD on HD (MWF), CAD (multiple prior PCIs), HIV (stable on antiretroviral medications), DM2, PPM and aortic stenosis who was admitted on 4/27/2020 with chest pain, SOB and emesis and was found to have hypoxic respiratory failure requiring BiPAP due to acute CHF exacerbation as well as NSTEMI. Patient was admitted to the ICU and respiratory failure has improved with HD (now off oxygen).  Patient also underwent coronary angiogram which showed complete occlusion of posterior lateral artery which will be medically managed.    Problem List/Assessment and Plan:     1. Acute Hypoxic Respiratory Failure: Presented with CP, SOB and emesis.  Was found to have hypoxic respiratory failure and started on BiPAP.  CXR showed interstitial infiltrates (consistent with edema), BNP elevated to 59,000.  COVID-19 was negative.  This was consistent with CHF exacerbation.  After HD respiratory status improved and he is now off BiPAP/oxygen.      2. Acute Systolic CHF Exacerbation: Presented with respiratory failure as above as well as CP (NSTEMI).  CXR showed pulmonary edema.  BNP elevated to 59,000.  He also has severe aortic stenosis which is likely contributing to CHF exacerbation in addition to ischemia.  TTE (3/31/20) showed EF of 45-50% with severe aortic stenosis.  He underwent HD with improvement of his volume status, HD will take place again today as well.  - Cardiology consulted, appreciate recommendations  - Coreg and Imdur resumed today    3. NSTEMI: Presented with CP, SOB and emesis.  History of CAD with multiple interventions to multiple coronary arteries (most recent intervention 12/2019 to distant right coronary artery, posterolateral artery and  posterior descending artery).  Troponin peaked at 27.  Patient did undergo coronary angiography on 4/28 which showed total occlusion of posterior lateral artery, recommendation for medical management.  Did again have an episode of CP last night and restarted on nitroglycerin drip, weaning this today as he is CP free.  - Cardiology consulted, appreciate recommendations  - Wean off nitroglycerin drip  - Start Imdur 30 mg every day (had been on 120 mg daily in the past)  - Continue Plavix, statin, ASA  - Coreg 6.25 mg BID added back today    4. ESRD on HD: Per report had a difficult time removing fluid during his last two outpatient HD runs.  Normally dialyzes on MWF.  EDW is 86 kg.  On 4/27 weight was 89.4 kg (fluid removal limited by intradialytic hypotension). Underwent HD on 4/28 and will also have HD today.  - Nephrology consulted, appreciate recommendations  - HD today    5. HIV: Continue PTA antiretrovirals.    6. Severe Aortic Stenosis: Will need to consider TAVR when he recovers from NSTEMI.  TTE repeated today.      7. S/P PPM for Symptomatic Severe Bradycardia: no issues.  Done in 5/2019.    8. HTN: Previously on Coreg 12.5mg, Avapro 300mg, Imdur 120 mg, prn clonidine.  Did have hypotension but improving now.    - Coreg 6.25 mg BID added today  - Imdur 30 mg every day added today    Diet: Combination Diet Regular Diet Adult; Dialysis Diet    DVT Prophylaxis: Pneumatic Compression Devices  Christie Catheter: not present  Code Status: DNR/DNI      Disposition Plan   Expected discharge: 1-2 days, recommended to prior living arrangement once CP free, stable blood pressure.  Entered: Clemencia Pal MD 04/29/2020, 7:56 AM     The patient's care was discussed with the Bedside Nurse and Patient.    Patient can transfer to cardiac telemetry floor.    Hospitalist Service  [facility]          Interval History (Subjective):      Patient was seen with his bedside nurse this morning.  He was sleeping but  easily awoken.  Had some CP last night and was started on nitroglycerin drip.  Currently has no CP, states it lasted for about 3 hours last night.  Denies SOB, nausea, emesis.  Only complaint is some discomfort due to the right groin line.                  Physical Exam:      Last Vital Signs:  /76   Pulse 82   Temp 99  F (37.2  C) (Oral)   Resp 17   Wt 87.4 kg (192 lb 9.6 oz)   SpO2 93%   BMI 26.86 kg/m      General: Alert, awake, no acute distress.  HEENT: Normocephalic and atraumatic, eyes anicteric and without scleral injection, EOMI, face symmetric, MMM.  Cardiac: RRR, normal S1, S2. Systolic murmur heard throughout precordium, no g/r, no LE edema.  Pulmonary: Normal chest rise, normal work of breathing.  Lungs CTAB without crackles or wheezing.  Abdomen: soft, non-tender, non-distended.  Normoactive bowel sounds, no guarding or rebound tenderness.  Extremities: no deformities.  Warm, well perfused.  Skin: no rashes or lesions.  Warm and Dry.  Neuro: No focal deficits.  Speech clear.  Coordination and strength grossly normal.  Psych: Alert and oriented x3. Appropriate affect.         Medications:      All current medications were reviewed with changes reflected in problem list.         Data:      All new lab and imaging data was reviewed.   Labs:  Recent Labs   Lab 04/29/20  0640 04/28/20  0555 04/27/20  2349   * 129* 127*   POTASSIUM 4.4 5.0 4.8   CHLORIDE 96 94 90*   CO2 30 27 25   ANIONGAP 6 8 12   * 235* 365*   BUN 33* 53* 48*   CR 5.64* 6.92* 6.41*   GFRESTIMATED 9* 7* 8*   GFRESTBLACK 11* 8* 9*   PRABHA 9.3 8.7 9.4     Recent Labs   Lab 04/28/20  0555 04/27/20  2349   WBC 9.8 14.8*   HGB 10.1* 11.1*   HCT 34.7* 39.4*   MCV 94 96   * 247     Recent Labs   Lab 04/29/20  0640 04/28/20  1753 04/28/20  1500   TROPI 9.935* 22.139* 24.066*      Imaging:   Recent Results (from the past 24 hour(s))   Cardiac Catheterization    Narrative      Prox RCA lesion is 40% stenosed.    Mid RCA  lesion is 25% stenosed.    RPAV lesion is 100% stenosed.    Ost LM lesion is 5% stenosed.    Prox LAD lesion is 20% stenosed.    Mid Cx lesion is 20% stenosed.     1. The distal RCA, PDA , two Lad and a diagonal stent are widely patent.   The bifurcated stent into the (small) PL branch is occluded -.  REC:   medical therapy although if persistent symptoms an attempt at  PCI of   PL is reasonable  2. The mean gradient across the aortic valve is 40mmHG consistent with   severe aortic stenosis. Some of the symptoms could be due to the aortic   stenosis plus the side branch occlusion. Clinical decision making for   treatment of aortic stenosis is recommended  The patient's daughter Pura was called (NA so message left with VM)         Clemencia Pal MD

## 2020-04-29 NOTE — PLAN OF CARE
ICU End of Shift Summary.  For vital signs and complete assessments, please see documentation flowsheets.     Pertinent assessments: A&Ox4, calling appropriately.  Pt tele SR with BBB and 1* AVB.  Pt c/o chest pain, started on nitroglycerin gtt, pain resolved at initial dose.  Pt BS active x4, no BM or urine output this shift.  Pt resting between cares  Major Shift Events: None  Plan (Upcoming Events): Wean off nitroglycerin gtt, monitor telemetry, HD today  Discharge/Transfer Needs: Continue ICU cares    Bedside Shift Report Completed : y  Bedside Safety Check Completed:y

## 2020-04-29 NOTE — PROGRESS NOTES
The mean gradient across the aortic valve on cardiac catheterization yesterday was 40 mmHg and on echocardiography today was 48 mmHg.  This represents severe aortic stenosis.  After discharge we will have the patient follow-up with Dr. Diaz with consideration for TAVR for this patient.

## 2020-04-29 NOTE — PROGRESS NOTES
Potassium   Date Value Ref Range Status   04/29/2020 4.4 3.4 - 5.3 mmol/L Final     Hemoglobin   Date Value Ref Range Status   04/28/2020 10.1 (L) 13.3 - 17.7 g/dL Final     Creatinine   Date Value Ref Range Status   04/29/2020 5.64 (H) 0.66 - 1.25 mg/dL Final     Urea Nitrogen   Date Value Ref Range Status   04/29/2020 33 (H) 7 - 30 mg/dL Final     Sodium   Date Value Ref Range Status   04/29/2020 132 (L) 133 - 144 mmol/L Final     INR   Date Value Ref Range Status   03/16/2020 1.08 0.86 - 1.14 Final       DIALYSIS PROCEDURE NOTE  Dialyzed pt at bedside in ICU; HIV positive  Hepatitis status of previous patient on machine log was checked and verified ok to use with this patients hepatitis status.  Patient dialyzed for 3.15 hrs. on a 2 then 3 K bath with a net fluid removal of  3L.  A BFR of 400 ml/min was obtained via a LUAF using 15 gauge needles, with no difficulty cannulating fistula.    The treatment plan was discussed with Dr. Marcial during the treatment.  Total heparin received during the treatment: 0 units.   Needle cannulation sites held x 7 min, pressure dressings applied after hemostasis.  Meds  given: None                               Complications: None    Procedure education provided orally to patient , patient verbalized understanding  ICEBOAT? Timeout performed pre-treatment  I: Patient was identified using 2 identifiers  C: Consent obtained/verified current before treatment  E: Equipment preventative maintenance is current and dialysis delivery system OK to use  B: Hepatitis B Surface Antigen: negative; Draw Date: 3/25/19      Hepatitis B Surface Antibody: Immune; Draw Date: 11/21/19  O: Dialysis orders present and complete prior to treatment  A: Vascular access verified and assessed prior to treatment  T: Treatment was performed at a clinically appropriate time  ?: Patient was allowed to ask questions and address concerns prior to treatment  See flowsheet in EPIC for further details and post  assessment.  Machine water alarm in place and functioning. Transducer pods intact and checked every 15min.  Report received from: Joan Herring RN  Report given to: Joan Herring RN  Chlorine/Chloramine water system checked every 4 hours.  Outpatient Dialysis at [facility]

## 2020-04-29 NOTE — PROGRESS NOTES
Cardiology Progress Note  DEDRICK Gary, CNP     follows with Dr. Diaz       Assessment and Plan:   Admit (4/27) after developing severe SOB and CP while watching TV following dialysis.  Evidence of HF exacerbation and NSTEMI  COVID -19 negative    PMH: multiple MIs, ESRD, aortic stenosis, multiple CHF exacerbations, s/p PPM, HTN, mixed hyperlipidemia, DM, HIV +, chronic anemia    NSTEMI:  -troponin peak 27 / underlying BBB  -complex CAD hx:  S/p diag/LAD stent (2015), s/p repeat LAD/Diag stenting due to in-stent restenosis (8/2016), s/p stenting to dRCA/DEANNA and stenting to rPDA (12/2019)  -cath (4/28) showed  of DEANNA stent, otherwise patent Diag/LAD and rPDA stents.  No PCI  Medical treatment advised.  Could entertain  PCI in future if CP persists  -some CP overnight treated w/ IV NTG  -troponin trending down  -will add back Imdur 30 mg today (previously on 120 mg)  -continued Plavix     Severe Aortic Stenosis:  -ECHO (3/31/20) mean AV gradient 39 mmHg , TERI 0.84 cm2  LVEF 45-50%  -mean AV gradient 40 mmHg during cath  -ECHO pending today  -severe aortic stenosis may account for some of his CP  -consider future TAVR when recovers from NSTEMI    Acute on chronic CHF exacerbation   -BNP 59,000 / pulmonary edema  -LVEF 45-50% w/ inferolateral & global hypokinesia (3/2020)  -likely valvular + ischemic CM  -SOB improved with dialysis.  Off BiPAP  -weight down > 10 lbs from admit  (192 lbs today)  -(-2500 OP)      S/p PPM for symptomatic severe bradycardia (5/2019)    HTN:  -BPs much improved.  Will add back carvedilol 6.25mg BID today  -(previously on Coreg 12.5mg / Avapro 300mg / Imdur 120 mg / prn clonidine)    Hyperlipidemia:   -On atorvastatin 40 mg  Very low LDL               Interval History:   No CP this am.  SOB resolved  Currently getting dialysis run                Medications:       - MEDICATION INSTRUCTIONS for Dialysis Patients -   Does not apply See Admin Instructions     sodium chloride 0.9%  250  mL Intravenous Once in dialysis     sodium chloride 0.9%  300 mL Hemodialysis Machine Once     aspirin  325 mg Oral Daily     atorvastatin  40 mg Oral At Bedtime     clopidogrel  75 mg Oral Daily     insulin aspart  1-6 Units Subcutaneous Q4H     - MEDICATION INSTRUCTIONS -   Does not apply Once     - MEDICATION INSTRUCTIONS -   Does not apply Once     pantoprazole  40 mg Oral Daily            Physical Exam:   Blood pressure 130/70, pulse 84, temperature 98.3  F (36.8  C), temperature source Oral, resp. rate 16, weight 87.4 kg (192 lb 9.6 oz), SpO2 96 %.  Wt Readings from Last 3 Encounters:   04/29/20 87.4 kg (192 lb 9.6 oz)   04/01/20 86.8 kg (191 lb 5.8 oz)   03/16/20 90.4 kg (199 lb 6.4 oz)     I/O last 3 completed shifts:  In: 145.17 [I.V.:145.17]  Out: 2800 [Other:2800]    CONST:  Alert and oriented  LUNGS:  Crackles in bases bilaterally  CARDIO:  RRR, II/VI systolic murmur heard best at base  ABD:  Soft  +BS  EXT:  No edema  Left femoral puncture site without bleed/hematoma           Data:   TELE:  SR w first degree AV block and BBB    CBC  Recent Labs   Lab 04/28/20  0555 04/27/20  2349   WBC 9.8 14.8*   HGB 10.1* 11.1*   * 247       BMP  Recent Labs   Lab 04/29/20  0640 04/28/20  0555 04/27/20  2349   * 129* 127*   POTASSIUM 4.4 5.0 4.8   CHLORIDE 96 94 90*   PRABHA 9.3 8.7 9.4   CO2 30 27 25   BUN 33* 53* 48*   CR 5.64* 6.92* 6.41*   * 235* 365*     Recent Labs   Lab Test 09/02/18  0629 08/09/17  0818  07/23/12  0712   CHOL 127 125   < > 298*   HDL 26* 33*   < > 37*   LDL Cannot estimate LDL when triglyceride exceeds 400 mg/dL  48 30   < > Cannot estimate LDL when triglyceride exceeds 400 mg/dL   TRIG 447* 311*   < > 1541*   CHOLHDLRATIO  --   --   --  8.1*    < > = values in this interval not displayed.       TROP  Lab Results   Component Value Date    TROPI 9.935 () 04/29/2020    TROPI 22.139 () 04/28/2020    TROPI 24.066 () 04/28/2020    TROPI 27.435 () 04/28/2020    TROPI  13.725 () 04/28/2020    TROPONIN 0.02 03/08/2018    TROPONIN 0.02 12/25/2017    TROPONIN 0.02 04/15/2017    TROPONIN 0.02 05/19/2016       BNP  Recent Labs   Lab 04/27/20  5843   NTBNPI 59,613*

## 2020-04-30 NOTE — PROGRESS NOTES
[facility]    Cardiology Progress Note    Date of Service: 04/30/2020     Assessment & Plan   Donald Raza is a 70 year old male with past medical history of multiple MIs, ESRD, aortic stenosis, multiple CHF exacerbations, s/p PPM, HTN, mixed hyperlipidemia, DM, HIV +, chronic anemia  This patient was admitted on 4/27/2020 with symptoms of severe shortness of breath, chest pain while watching TV after dialysis; evidence of HF exacerbation and NONSTEMI  COVID-19 negative      1. NSTEMI:  -troponin peak 27/ intermittent LBBB-pacing  -complex CAD hx:  S/p diag/LAD stent (2015)  -s/p repeat LAD/Diag stenting due to in-stent restenosis (8/2016)  s/p stenting to dRCA/DEANNA and stenting to rPDA (12/2019)  -cath (4/28/2020) showed  of DEANNA stent, otherwise patent Diag/LAD and rPDA stents.  No PCI      Medical treatment advised  -Could entertain  PCI in future if CP persists  -no chest pain overnight per nurses and patient  -troponin peak 27  -Imdur 30 mg daily added yesterday (previously on 120 mg)  -continued Plavix   -resume ASA 81mg daily as PTA     2.Severe Aortic Stenosis:  -ECHO (3/31/20) mean AV gradient 39 mmHg , TERI 0.84 cm2  LVEF 45-50%  -mean AV gradient 40 mmHg during cath  -ECHO : mean gradient up to 48 TERI 0.83cm2  -EF 45% with severe inferior and inferolateral WMA  -mean gradient on LHC 40  -severe aortic stenosis may account for some of his CP  -consider future TAVR when recovers from NSTEM  Has visit with Dr. Diaz 6-  -avoid hypotension     3. Acute on chronic CHF exacerbation   -EF 45%--(45-50% on last echo)  --LVEF 45-50% w/ inferolateral & global hypokinesia (3/2020)  RVSP elevated but same at 45 plus RAP of 3  -MAC with mean gradient across mitral valve of 7mmHg, heart rate 85-90  -BNP 59,000 / pulmonary edema    HF likely valvular + ischemic CM/pulmonary hypertension  -SOB improved with dialysis.    Off BiPAP  -weight down 12 lbs from admit  (192 lbs yesterday; not weighed  today  -(-5300cc OP)  -lasix 40mg four days per week  -still on 1Liter O2 when sleeping as desats to 87% at those times        4. S/p PPM for symptomatic severe bradycardia (5/2019)     5. HTN:  -BPs improved  -carvedilol 6.25mg BID today  -(previously on Coreg 12.5mg / Avapro 300mg / Imdur 120 mg / prn clonidine)  -irbesartan not restarted yet--avoid hypotension with aortic stenosis and for dilaysis     6. Hyperlipidemia:   -On atorvastatin 40 mg  Very low LDL    7. ESRD, on dialysis MW  -would appreciate Dr. Marcial's input on restarting Irbesartan as to avoid hypotension    8. desats when sleeping    Dara Hummel, MSN, APRN, CNP  [facility]    Interval History   Alert;awakens easily; denies chest pain  States breathing is better    Review of Systems:  The Review of Systems is negative other than noted in the HPI    Physical Exam   Temp: 98.3  F (36.8  C) Temp src: Oral BP: 135/69 Pulse: 73 Heart Rate: 72 Resp: 19 SpO2: 93 % O2 Device: Nasal cannula Oxygen Delivery: 1 LPM  Vitals:    04/28/20 0530 04/28/20 2345 04/29/20 0645   Weight: 95.3 kg (210 lb 1.6 oz) 91.4 kg (201 lb 9.6 oz) 87.4 kg (192 lb 9.6 oz)     Vital Signs with Ranges  Temp:  [97.8  F (36.6  C)-98.5  F (36.9  C)] 98.3  F (36.8  C)  Pulse:  [62-87] 73  Heart Rate:  [67-87] 72  Resp:  [9-37] 19  BP: ()/(49-89) 135/69  SpO2:  [86 %-100 %] 93 %  I/O last 3 completed shifts:  In: 274.2 [P.O.:240; I.V.:34.2]  Out: 3000 [Other:3000]    Constitutional     alert and oriented, in no acute distress.     Skin     warm and dry to touch    ENT     Mild pallor; no cyanosis    Neck    Supple, JVP normal,bilateral carotid bruit    Chest     no tenderness to palpation     Lungs  Coarse breath sounds/crackle 1/2 up left lung; base of right lung    Cardiac  regular rhythm, S1 normal, S2 diffcult to hear, No S3 or S4, III/VI systolic murmurs LUSB radiating to carotids, no rubs    Abdomen     abdomen soft, bowel sounds normoactive, no  hepatosplenomegaly    Extremities and Back     no clubbing, cyanosis. No edema observed.    Av fistula left arm      Neurological     no gross motor deficits noted, affect appropriate, oriented to time, person and place.        Medications       - MEDICATION INSTRUCTIONS for Dialysis Patients -   Does not apply See Admin Instructions     abacavir  600 mg Oral QPM     aspirin  325 mg Oral Daily     atorvastatin  40 mg Oral At Bedtime     carvedilol  6.25 mg Oral BID w/meals     clopidogrel  75 mg Oral Daily     dapsone  100 mg Oral At Bedtime     darunavir  800 mg Oral At Bedtime     dolutegravir  50 mg Oral At Bedtime     furosemide  40 mg Oral Once per day on Sun Tue Thu Sat     gabapentin  300 mg Oral BID     insulin aspart  1-7 Units Subcutaneous TID AC     insulin aspart  1-5 Units Subcutaneous At Bedtime     isosorbide mononitrate  30 mg Oral Daily     mirtazapine  15 mg Oral At Bedtime     pantoprazole  40 mg Oral Daily     ritonavir  100 mg Oral At Bedtime     sucroferric oxyhydroxide  500 mg Oral TID w/meals     umeclidinium  1 puff Inhalation Daily          Data:     ROUTINE IP LABS (Last four results)  BMP  Recent Labs   Lab 04/29/20  0640 04/28/20  0555 04/27/20  2349   * 129* 127*   POTASSIUM 4.4 5.0 4.8   CHLORIDE 96 94 90*   PRABHA 9.3 8.7 9.4   CO2 30 27 25   BUN 33* 53* 48*   CR 5.64* 6.92* 6.41*   * 235* 365*     CHOLESTEROL/HEPATIC  Recent Labs   Lab 04/27/20  2349   ALT 14   AST 15     CBC  Recent Labs   Lab 04/28/20  0555 04/27/20  2349   WBC 9.8 14.8*   RBC 3.69* 4.10*   HGB 10.1* 11.1*   HCT 34.7* 39.4*   MCV 94 96   MCH 27.4 27.1   MCHC 29.1* 28.2*   RDW 18.9* 19.5*   * 247     TROP:   Recent Labs   Lab 04/29/20  0640 04/28/20  1753 04/28/20  1500 04/28/20  1000 04/28/20  0555   TROPI 9.935* 22.139* 24.066* 27.435* 13.725*      BNP:    Recent Labs   Lab 04/27/20  2349   NTBNPI 59,540*     INRNo lab results found in last 7 days.  TSH   Date Value Ref Range Status   05/13/2019  4.48 (H) 0.40 - 4.00 mU/L Final       EKG results:  Reviewed if available     Imaging:  No results found for this or any previous visit (from the past 24 hour(s)).  Telemetry:

## 2020-04-30 NOTE — DISCHARGE INSTRUCTIONS
A telephonic hospital follow up appointment has been scheduled for you with Dr. Owen at [facility] for May 8th at 11am. Please note the doctor will call you and speak with you via phone. You do not need to go to the clinic. Please call the clinic at [phone] if you need to reschedule.

## 2020-04-30 NOTE — PLAN OF CARE
ICU End of Shift Summary.  For vital signs and complete assessments, please see documentation flowsheets.     Pertinent assessments: Sleepy this AM, following commands, but refusing to get OOB/up into chair. AVSS, on 1 LPM when sleeping- desats  to  87-89%. LS dim. Tele SR with 1st degree AVB, BBB. Denies pain. Left fistula +bruit/thrill, dressing CDI. Right PIV saline locked.   Major Shift Events: Sleepy, refused breakfast.   Pt woke up/more alert after labs drawn. Ordered lunch and SBA up to chair.  Plan (Upcoming Events): Wean off oxygen. Possible discharge  today  Discharge/Transfer Needs: Back to home dialysis days. No new needs  per care  coordinator     Bedside Shift Report Completed :   Bedside Safety Check Completed:

## 2020-04-30 NOTE — PLAN OF CARE
ICU End of Shift Summary.  For vital signs and complete assessments, please see documentation flowsheets.     Pertinent assessments: A&Ox4, calling appropriately.  Denying pain, nausea.  Pt tele SR.  Up to bed.  Pt anuric.  Trial weaning O2 to RA, desats when sleeping  Major Shift Events: None  Plan (Upcoming Events): Wean O2 as able, monitor telemetry.  Discharge/Transfer Needs: None    Bedside Shift Report Completed : y  Bedside Safety Check Completed:y

## 2020-04-30 NOTE — DISCHARGE SUMMARY
[facility]  Discharge Summary  Name: Donald Raza    MRN: [id number]  YOB: 1950    Age: 70 year old  Date of Discharge:  4/30/2020  Date of Admission: 4/27/2020  Primary Care Provider: Sukh Owen  Discharge Physician:  Clemencia Pal MD  Discharging Service:  Hospitalist      Discharge Diagnoses:  1. Acute Hypoxic Respiratory Failure  2. Acute Systolic CHF Exacerbation  3. NSTEMI  4. ESRD on HD  5. HIV  6. Severe Aortic Stenosis  7. S/P PPM for Symptomatic Severe Bradycardia  8. HTN     Follow-ups Needed After Discharge   1. Continue Regular HD schedule (next on 5/1/20)  2. Follow up with Cardiology in clinic    Unresulted Labs Ordered in the Past 30 Days of this Admission     No orders found from 10/16/2018 to 12/16/2018.        Hospital Course:   Donald Raza is a 70 year old male with past medical history of ESRD on HD (MWF), CAD (multiple prior PCIs), HIV (stable on antiretroviral medications), DM2, PPM and aortic stenosis who was admitted on 4/27/2020 with chest pain, SOB and emesis and was found to have hypoxic respiratory failure requiring BiPAP due to acute CHF exacerbation as well as NSTEMI. Patient was admitted to the ICU and respiratory failure has improved with HD (now off oxygen).  Patient also underwent coronary angiogram which showed complete occlusion of posterior lateral artery which will be medically managed.     1. Acute Hypoxic Respiratory Failure: Presented with CP, SOB and emesis.  Was found to have hypoxic respiratory failure and started on BiPAP.  CXR showed interstitial infiltrates (consistent with edema), BNP elevated to 59,000.  COVID-19 was negative.  This was consistent with CHF exacerbation.  After HD respiratory status improved and he is now off BiPAP/oxygen.       2. Acute Systolic CHF Exacerbation: Presented with respiratory failure as above as well as CP (NSTEMI).  CXR showed pulmonary edema.  BNP elevated to 59,000.  He also has severe aortic  stenosis which is likely contributing to CHF exacerbation in addition to ischemia.  TTE (3/31/20) showed EF of 45-50% with severe aortic stenosis.  He underwent HD on 4/28 and 4/29.  Antihypertensive regimen adjusted (previously on Coreg 12.5mg, Avapro 300mg, Imdur 120 mg, prn clonidine) due to hypotension.  Upon discharge he will be on lower dose Coreg and Imdur.  Cardiology did follow during admission.     3. NSTEMI: Presented with CP, SOB and emesis.  History of CAD with multiple interventions to multiple coronary arteries (most recent intervention 12/2019 to distant right coronary artery, posterolateral artery and posterior descending artery).  Troponin peaked at 27.  Patient did undergo coronary angiography on 4/28 which showed total occlusion of posterior lateral artery, recommendation for medical management.  Has been chest pain free for 24 hours prior to discharge.  He was continued on statin, aspirin and Plavix.  His Coreg dose was decreased from 12.5 mg BID to 9.375 mg BID.  Imdur was decreased from 120 mg every day to 30 mg every day.      4. ESRD on HD: Per report had a difficult time removing fluid during his last two outpatient HD runs PTA.  Normally dialyzes on MWF.  EDW is 86 kg.  On 4/27 weight was 89.4 kg (fluid removal limited by intradialytic hypotension). Underwent HD on 4/28 and 4/29.  Resume regular outpatient HD schedule on 5/1.     5. HIV: Continue PTA antiretrovirals.     6. Severe Aortic Stenosis: Will need to consider TAVR when he recovers from NSTEMI.  Does have severe aortic stenosis.  Cardiology will arrange outpatient follow up.      7. S/P PPM for Symptomatic Severe Bradycardia: no issues.  Done in 5/2019.     8. HTN: Previously on Coreg 12.5mg, Avapro 300mg, Imdur 120 mg, prn clonidine.  Has had relative hypotension.  At time of discharge he will be on Coreg 9.375 mg BID and Imdur 30 mg every day.     Discharge Disposition:  Discharged to home     Allergies:  Allergies   Allergen  Reactions     Calcium Acetate Other (See Comments)     Other reaction(s): Other, see comments  Pain in chest and back  Pain in chest area (sensitive skin)      Contrast Dye Other (See Comments)     Contrast nephropathy     Diagnostic X-Ray Materials Other (See Comments)     PN: renal failure     Lisinopril      Sulfa Drugs         Condition on Discharge:  Discharge condition: Stable   Discharge vitals: Blood pressure 122/59, pulse 68, temperature 97.3  F (36.3  C), temperature source Axillary, resp. rate 17, weight 85 kg (187 lb 6.3 oz), SpO2 97 %.   Code status on discharge: DNR / DNI     History of Illness:  See detailed admission note for full details.    Physical Exam:  Blood pressure 122/59, pulse 68, temperature 97.3  F (36.3  C), temperature source Axillary, resp. rate 17, weight 85 kg (187 lb 6.3 oz), SpO2 97 %.  Wt Readings from Last 1 Encounters:   04/30/20 85 kg (187 lb 6.3 oz)     General: Alert, awake, no acute distress. Sitting up in a chair eating breakfast.  HEENT: Normocephalic and atraumatic, eyes anicteric and without scleral injection, EOMI, face symmetric, MMM.  Cardiac: RRR, normal S1, S2. Systolic murmur heard throughout precordium, no g/r, no LE edema.  Pulmonary: Normal chest rise, normal work of breathing.  Lungs CTAB without crackles or wheezing.  Abdomen: soft, non-tender, non-distended.  Normoactive bowel sounds, no guarding or rebound tenderness.  Extremities: no deformities.  Warm, well perfused.  Skin: no rashes or lesions.  Warm and Dry.  Neuro: No focal deficits.  Speech clear.  Coordination and strength grossly normal.  Psych: Alert and oriented x3. Appropriate affect.     Procedures other than Imaging:  Coronary Angiogram  TTE  Telemetry  HD     Imaging:  Results for orders placed or performed during the hospital encounter of 04/27/20   XR Chest Port 1 View    Narrative    EXAM: XR CHEST PORT 1 VW  LOCATION: Health system  DATE/TIME: 4/27/2020 11:02 PM    INDICATION:  Shortness of breath  COMPARISON: 2020      Impression    IMPRESSION: Progressive or recurrent changes of CHF with prominent perihilar opacity, interstitial edema, and peribronchial cuffing now marked. No pleural effusion or pneumothorax. Mild cardiomegaly. Stable 2-lead right chest wall pacer.   Echocardiogram Limited    Narrative    [id number]  [id number]  [id number]  [id number]^NAYELI^MAE^[facility]  Echocardiography Laboratory  [address] [place] [address]        Name: GREGORIO BRUSH  MRN: [id number]  : 1950  Study Date: 2020 07:58 AM  Age: 70 yrs  Gender: Male  Patient Location: [facility]  Reason For Study: Acute Myocardial Infarction  Ordering Physician: MAE TYLER  Performed By: Shea Liu     BSA: 2.1 m2  Height: 71 in  Weight: 192 lb  HR: 87  BP: 146/77 mmHg  _____________________________________________________________________________  __        Procedure  Limited Portable Echo Adult.  _____________________________________________________________________________  __        Interpretation Summary     The visual ejection fraction is estimated at 45%.  There is severe inferior and inferolateral wall hypokinesis.  There is sclerosis, calcification, and restriction of the aortic valve opening  compatible with severe aortic stenosis.  The mean AoV pressure gradient is 48mmHg.  A bicuspid aortic valve cannot be excluded.  There is moderate to severe mitral annular calcification.  The mean mitral valve gradient is 7mmHg at HR 85-90. Suspect much of this due  to MAC.  The right ventricular systolic function is mild to moderately reduced.  Moderate (46-55mmHg) pulmonary hypertension is present.  The ascending aorta is Mildly dilated.  Compared to the prior study, the inferior and inferolateral hypokinesis  appears further decreased and more extensive.  _____________________________________________________________________________  __         Left Ventricle  The left ventricle is normal in size. There is mild to moderate concentric  left ventricular hypertrophy. Diastolic function not assessed due to  significant mitral annular calcification. The visual ejection fraction is  estimated at 45%. There is severe inferior and inferolateral wall hypokinesis.     Right Ventricle  The right ventricle is normal size. The right ventricular systolic function is  mild to moderately reduced.     Atria  Left atrial enlargement is noted on two-dimensional imaging. Right atrial size  is normal.     Mitral Valve  There is moderate to severe mitral annular calcification. The mitral papillary  muscle appears thickened and/or calcified. Thickened mitral valve anterior  leaflet. There is mild (1+) mitral regurgitation. The mean mitral valve  gradient is 7mmHg.        Tricuspid Valve  The tricuspid valve is not well visualized, but is grossly normal. There is  trace to mild tricuspid regurgitation. The right ventricular systolic pressure  is approximated at 45.3 mmHg plus the right atrial pressure. IVC diameter <2.1  cm collapsing >50% with sniff suggests a normal RA pressure of 3 mmHg.  Moderate (46-55mmHg) pulmonary hypertension is present.     Aortic Valve  A bicuspid aortic valve cannot be excluded. No aortic regurgitation is  present. There is sclerosis, calcification, and restriction of the aortic  valve opening compatible with severe aortic stenosis. The mean AoV pressure  gradient is 48mmHg.     Pulmonic Valve  The pulmonic valve is not well visualized. There is trace pulmonic valvular  regurgitation. Normal pulmonic valve velocity.     Vessels  The ascending aorta is Mildly dilated.     Pericardium  There is no pericardial effusion.        Rhythm  Sinus rhythm was noted.  _____________________________________________________________________________  __  MMode/2D Measurements & Calculations     IVSd: 1.6 cm  LVIDd: 5.0 cm  LVIDs: 4.1 cm  LVPWd: 1.5 cm  FS: 18.4  %  LV mass(C)d: 333.9 grams  LV mass(C)dI: 161.1 grams/m2  asc Aorta Diam: 3.9 cm  LVOT diam: 2.0 cm  LVOT area: 3.3 cm2  RWT: 0.61        Doppler Measurements & Calculations  MV E max randy: 147.6 cm/sec  MV A max randy: 144.0 cm/sec  MV E/A: 1.0  MV max P.7 mmHg  MV mean P.8 mmHg  MV V2 VTI: 35.3 cm  MVA(VTI): 2.4 cm2  MV dec time: 0.14 sec  Ao V2 max: 438.2 cm/sec  Ao max P.0 mmHg  Ao V2 mean: 335.6 cm/sec  Ao mean P.0 mmHg  Ao V2 VTI: 98.1 cm  TERI(I,D): 0.85 cm2  TERI(V,D): 0.83 cm2  LV V1 max P.0 mmHg  LV V1 max: 111.4 cm/sec  LV V1 VTI: 25.5 cm  SV(LVOT): 83.4 ml  SI(LVOT): 40.3 ml/m2  TR max randy: 336.6 cm/sec  TR max P.3 mmHg     AV Randy Ratio (DI): 0.25  TERI Index (cm2/m2): 0.41  Lateral E/e': 22.6           _____________________________________________________________________________  __           Report approved by: Digna French 2020 12:54 PM      Cardiac Catheterization    Narrative      Prox RCA lesion is 40% stenosed.    Mid RCA lesion is 25% stenosed.    RPAV lesion is 100% stenosed.    Ost LM lesion is 5% stenosed.    Prox LAD lesion is 20% stenosed.    Mid Cx lesion is 20% stenosed.     1. The distal RCA, PDA , two Lad and a diagonal stent are widely patent.   The bifurcated stent into the (small) PL branch is occluded -.  REC:   medical therapy although if persistent symptoms an attempt at  PCI of   PL is reasonable  2. The mean gradient across the aortic valve is 40mmHG consistent with   severe aortic stenosis. Some of the symptoms could be due to the aortic   stenosis plus the side branch occlusion. Clinical decision making for   treatment of aortic stenosis is recommended  The patient's daughter Pura was called (NA so message left with VM)            Consultations:  Consultations This Hospital Stay   NEPHROLOGY IP CONSULT  PHARMACY TO DOSE HEPARIN  CARDIOLOGY IP CONSULT  CARDIOLOGY IP CONSULT  CARE COORDINATOR IP CONSULT  PHARMACY IP CONSULT  PHARMACY IP  CONSULT  SMOKING CESSATION PROGRAM IP CONSULT     Recent Lab Results:  Recent Labs   Lab 04/30/20  1153 04/28/20  0555 04/27/20  2349   WBC 5.4 9.8 14.8*   HGB 10.2* 10.1* 11.1*   HCT 35.2* 34.7* 39.4*   MCV 95 94 96   * 141* 247     Recent Labs   Lab 04/30/20  1153 04/29/20  0640 04/28/20  0555    132* 129*   POTASSIUM 4.0 4.4 5.0   CHLORIDE 96 96 94   CO2 29 30 27   ANIONGAP 8 6 8   * 173* 235*   BUN 40* 33* 53*   CR 5.68* 5.64* 6.92*   GFRESTIMATED 9* 9* 7*   GFRESTBLACK 11* 11* 8*   PRABHA 9.8 9.3 8.7     Recent Labs   Lab 04/29/20  0640 04/28/20  1753 04/28/20  1500   TROPI 9.935* 22.139* 24.066*          Pending Results:    Unresulted Labs Ordered in the Past 30 Days of this Admission     No orders found from 3/28/2020 to 4/28/2020.           Discharge Instructions and Follow-Up:   Discharge Orders      Reason for your hospital stay    You were hospitalized for severe shortness of breath.  This was due to a heart failure exacerbation which improved with dialysis.  You also have severe aortic stenosis and will need to speak with your cardiologist in clinic about the possibility of a valve replacement/repair.     Follow-up and recommended labs and tests     Continue your regular dialysis schedule upon discharge.  Follow up with Cardiology in clinic.     Activity    Your activity upon discharge: activity as tolerated     DNR/DNI     Diet    Follow this diet upon discharge: Orders Placed This Encounter      Snacks/Supplements Adult: Nepro Oral Supplement; With Meals      Combination Diet Regular Diet Adult; Dialysis Diet     Discharge Medications   Current Discharge Medication List      CONTINUE these medications which have CHANGED    Details   carvedilol (COREG) 3.125 MG tablet Take 3 tablets (9.375 mg) by mouth 2 times daily (with meals)  Qty: 180 tablet, Refills: 0    Associated Diagnoses: Hypertensive emergency      isosorbide mononitrate (IMDUR) 30 MG 24 hr tablet Take 1 tablet (30 mg) by  mouth daily  Qty: 30 tablet, Refills: 0    Associated Diagnoses: Coronary artery disease involving native coronary artery of native heart without angina pectoris         CONTINUE these medications which have NOT CHANGED    Details   abacavir (ZIAGEN) 300 MG tablet Take 600 mg by mouth every evening       albuterol (PROAIR HFA/PROVENTIL HFA/VENTOLIN HFA) 108 (90 BASE) MCG/ACT Inhaler Inhale 2 puffs into the lungs every 6 hours as needed for shortness of breath / dyspnea or wheezing  Qty: 1 Inhaler, Refills: 1    Associated Diagnoses: Cough      aspirin 81 MG EC tablet Take 81 mg by mouth every evening      atorvastatin (LIPITOR) 40 MG tablet Take 40 mg by mouth At Bedtime      chlorhexidine (CHLORHEXIDINE) 0.12 % solution Rinse and gargle 15 ml by mouth twice a day as directed.      cinacalcet (SENSIPAR) 30 MG tablet Take 30 mg by mouth three times a week On dialysis days      CLONAZEPAM PO Take 0.5 mg by mouth nightly as needed for anxiety (restless legs)      cloNIDine (CATAPRES) 0.1 MG tablet Take 0.1 mg by mouth daily as needed (for high blood pressure) If sbp > 180      clopidogrel (PLAVIX) 75 MG tablet Take 1 tablet (75 mg) by mouth daily  Qty: 30 tablet, Refills: 1    Associated Diagnoses: Coronary artery disease involving native coronary artery of native heart without angina pectoris      dapsone 100 MG tablet Take 100 mg by mouth At Bedtime       Darunavir Ethanolate (PREZISTA PO) Take 800 mg by mouth At Bedtime.      dolutegravir (TIVICAY) 50 MG tablet Take 50 mg by mouth At Bedtime      DOXERCALCIFEROL IV (given at dialysis)_      epoetin brittni (EPOGEN,PROCRIT) 50656 UNIT/ML injection WITH DIALYSIS;      furosemide (LASIX) 40 MG tablet Take 1 tablet (40 mg) by mouth See Admin Instructions Take one tablet daily on non-dialysis days (Tuesday, Thursday, Saturday and Sunday)  Qty: 60 tablet, Refills: 1    Associated Diagnoses: Severe aortic stenosis      gabapentin (NEURONTIN) 300 MG capsule Take 300 mg by  "mouth 2 times daily       imiquimod (ALDARA) 5 % cream Apply topically as needed      insulin aspart (NOVOLOG FLEXPEN) 100 UNIT/ML pen Inject Subcutaneous 3 times daily (with meals) Take 2 units for every 50 BG above 150      ipratropium - albuterol 0.5 mg/2.5 mg/3 mL (DUONEB) 0.5-2.5 (3) MG/3ML neb solution Take 1 vial (3 mLs) by nebulization every 6 hours as needed for shortness of breath / dyspnea or wheezing  Qty: 90 vial, Refills: 1    Associated Diagnoses: Acute respiratory failure with hypoxia (H)      ketoconazole (NIZORAL) 2 % cream Apply topically daily Between toes for fungal infection      MAGNESIUM OXIDE PO Take 400 mg by mouth At Bedtime      melatonin 5 MG tablet Take 5 mg by mouth nightly as needed       mirtazapine (REMERON) 15 MG tablet Take 15 mg by mouth At Bedtime      nitroglycerin (NITROSTAT) 0.4 MG SL tablet One tablet under the tongue every 5 minutes if needed for chest pain. May repeat every 5 minutes for a maximum of 3 doses in 15 minutes\"  Qty: 25 tablet, Refills: 3    Associated Diagnoses: Coronary artery disease due to lipid rich plaque; Status post coronary angioplasty      Nutritional Supplements (NEPRO PO) Take 1 Container by mouth 3 times daily 2-3 times daily      ondansetron (ZOFRAN-ODT) 4 MG ODT tab Take 4 mg by mouth every 8 hours as needed for nausea      pantoprazole (PROTONIX) 40 MG EC tablet Take 40 mg by mouth daily      polyethylene glycol (MIRALAX/GLYCOLAX) packet Take 1 packet by mouth daily as needed for constipation      ritonavir (NORVIR) 100 MG capsule Take 1 capsule by mouth At Bedtime       sucroferric oxyhydroxide (VELPHORO) 500 MG CHEW chewable tablet Take 500 mg by mouth 3 times daily (with meals)       umeclidinium (INCRUSE ELLIPTA) 62.5 MCG/INH inhaler Inhale 1 puff into the lungs daily         STOP taking these medications       irbesartan (AVAPRO) 300 MG tablet Comments:   Reason for Stopping:               Time Spent on this Encounter   I, Clemencia Pal, " MD, personally saw the patient today and spent greater than 30 minutes discharging this patient.    Clemencia Pal MD

## 2020-04-30 NOTE — PROGRESS NOTES
Respiratory Therapy Note    An ABG was completed on the pt's right radial artery @ 11:55 on a 1 Lpm NC.  Pressure was held until bleeding stopped.  No complications noted during the procedure.        April 30, 2020 11:54 AM  Chidi Hernandez RT

## 2020-04-30 NOTE — PLAN OF CARE
ICU End of Shift Summary.  For vital signs and complete assessments, please see documentation flowsheets.     Pertinent assessments: A&Ox4, up to chair with Ax1, VSS, afebrile, denies pain, ate well at meal time including supplement  Major Shift Events: IV removed, meds from pharmacy with belongings, and discharge summary reviewed  Plan (Upcoming Events): home with family today  Discharge/Transfer Needs: picked up by family with all belongings and meds

## 2020-05-01 PROBLEM — I95.9 HYPOTENSION: Status: ACTIVE | Noted: 2020-01-01

## 2020-05-01 NOTE — PLAN OF CARE
ROOM #225    Living Situation (if not independent, order SW consult): with wife and brothers  Facility name:  : Wife in chart     Activity level at baseline:ind; walker for long distances   Activity level on admit: Up with assist of 1       Patient registered to observation; given Patient Bill of Rights; given the opportunity to ask questions about observation status and their plan of care.  Patient has been oriented to the observation room, bathroom and call light is in place.    Discussed discharge goals and expectations with patient/family.

## 2020-05-01 NOTE — PROGRESS NOTES
Renal Medicine Progress Note            Assessment/Plan:     70 y.o man with ESRD, CAD, ICM with EF of 45-50% and moderate to severe aortic stenosis, admitted for volume overload,      #ESRD:                -MWF at [facility]                -3.15hrs, Qb 450, EDW 56 kg, LAVF               -Last two post HD weights 87.4 and 89.4, unable to optimize UF intra-dialytic hypotension.      # SOB from pulmonary edema: Improved with UF.      # NSTEMI: LHC showed  DEANNA stent. Medical management was recommended.                -mange per cardiology     # Severe aortic stenosis: Likely contributes to intermittent intra-dialytic hypotension making fluid removal difficult in the outpatient setting.                -manage per cardiology     # Severe CAD with EF of 45-50%.     # HIV on ART.      # CKD-MBD:                -in-center Sensipar 30 mg                -Hectorol 1 mcg     # Anemia:                -20K Epogen               -50 mcg weekly     Plan:  # UF 1-2 liters net. No weight today.   # Check post HD weight.   # Midodrine 10 mg with HD  # Epogen 20K units with HD        Interval History:     Patient was discharged yesterday. He says he started to feel dizzy and weak last night. Per report from dialysis RN at [facility], his systolic BP was in the 60s-80s. He was symptomatic. He denies worsening shortness of breath, chest and abdominal pain. He denies fever and productive cough. He is getting HD treatment. His BP is in the 80s.          Medications and Allergies:       sodium chloride 0.9%  250 mL Intravenous Once in dialysis     sodium chloride 0.9%  300 mL Hemodialysis Machine Once     epoetin brittni-epbx (RETACRIT) inj ESRD  20,000 Units Intravenous Once     heparin (porcine)  500 Units Hemodialysis Machine Once in dialysis     sodium chloride (PF)  3 mL Intracatheter Q8H        Allergies   Allergen Reactions     Calcium Acetate Other (See Comments)     Other reaction(s): Other, see comments  Pain in chest and  back  Pain in chest area (sensitive skin)      Contrast Dye Other (See Comments)     Contrast nephropathy     Diagnostic X-Ray Materials Other (See Comments)     PN: renal failure     Lisinopril      Sulfa Drugs             Physical Exam:   Vitals were reviewed  Heart Rate: 65, Blood pressure (!) 77/47, pulse 63, temperature 98  F (36.7  C), temperature source Oral, resp. rate 18, SpO2 99 %.    Wt Readings from Last 3 Encounters:   04/30/20 85 kg (187 lb 6.3 oz)   04/01/20 86.8 kg (191 lb 5.8 oz)   03/16/20 90.4 kg (199 lb 6.4 oz)     No intake or output data in the 24 hours ending 05/01/20 1330    GENERAL:  NAD .   HEENT: EOMI. PERR. OMM. Wears eye glasses.   HEART:  RRR, + murmur. No GR.   CHEST:  Poor airflow. No wheezes.   ABDOMEN:  obese, soft, NT  EXTREMITIES:  Pedal pulses are 1+ bilaterally.  No peripheral edema noted.   SKIN:  No obvious skin lesions noted.   NEUROLOGIC:  a/o x 3. Grossly intact.   Psych: pleasant.          Data:     CBC RESULTS:     Recent Labs   Lab 05/01/20  0744 04/30/20  1153 04/28/20  0555 04/27/20  2349   WBC 6.4 5.4 9.8 14.8*   RBC 3.60* 3.72* 3.69* 4.10*   HGB 10.0* 10.2* 10.1* 11.1*   HCT 33.9* 35.2* 34.7* 39.4*   * 121* 141* 247       Basic Metabolic Panel:  Recent Labs   Lab 05/01/20  0744 04/30/20  1153 04/29/20  0640 04/28/20  0555 04/27/20  2349    133 132* 129* 127*   POTASSIUM 4.3 4.0 4.4 5.0 4.8   CHLORIDE 94 96 96 94 90*   CO2 25 29 30 27 25   BUN 60* 40* 33* 53* 48*   CR 7.08* 5.68* 5.64* 6.92* 6.41*   * 203* 173* 235* 365*   PRABHA 9.2 9.8 9.3 8.7 9.4       INR  Recent Labs   Lab 05/01/20  0744   INR 0.95      Attestation:   I have reviewed today's relevant vital signs, notes, medications, labs and imaging.    Doyle Marcial MD  [facility] - Nephrology  Office phone :[phone]  Pager: [phone]

## 2020-05-01 NOTE — PLAN OF CARE
PRIMARY DIAGNOSIS: Hypotension    OUTPATIENT/OBSERVATION GOALS TO BE MET BEFORE DISCHARGE  1. Orthostatic performed: No    2. Tolerating PO medications: Yes    3. Return to near baseline physical activity: Yes    4. Cleared for discharge by consultants (if involved): No    Patient is alert and oriented x4. VSS besides low BP. Denies pain. Complains of lightheadedness. Will continue to monitor BP closely.  before dinner. Nephrology consulted. Care coordinator consulted. Up with assist of 1.     Discharge Planner Nurse   Safe discharge environment identified: Yes  Barriers to discharge: No       Entered by: Kaykay Howard 05/01/2020 4:45 PM     Please review provider order for any additional goals.   Nurse to notify provider when observation goals have been met and patient is ready for discharge.

## 2020-05-01 NOTE — TELEPHONE ENCOUNTER
This encounter was opened in error. Please disregard.   Respiratory Therapy   Respiratory Therapy    Pt is stable on RA with SATS in the high 90s.  Pt cough produces no discharge from trach stoma.  When gauze changed, it is clean and dry.  Small airleak still present.    Section completed by:  Ana Becker, RRT

## 2020-05-01 NOTE — ED PROVIDER NOTES
"  History     Chief Complaint:  Hypotension      The history is provided by the patient.      Donald Raza is a 70 year old male with a history of TIA, HTN, HLD, ESRD on HD (MWF), CAD, HIV/AIDS, DM2, PPM, aortic stenosis and colon cancer who presents to the emergency department for evaluation of hypotension. The patient was discharged from the hospital yesterday with the below diagnoses. He did have chest pain while in the hospital but none at this time during my initial exam. He felt good leaving the hospital but then around [id number] yesterday he developed some dizziness and lightheadedness while laying in bed which was aggravated when he stood up. He describes this as a \"wobbly\" feeling as opposed to a \"room-spinning dizziness.\" The patient also describes a \"weight in his shoulders.\" This morning he was supposed to have dialysis but was unable to do so because his systolic blood pressure was found to be 65. His last dialysis treatment was 3 days ago. He continues to be lightheaded at this time. Denies cough, sore throat, fever or chills. Denies nausea, diarrhea or vomiting. Denies black or tarry stools. Of note, the patient told his nurse he is now experiencing some chest pain and pain between the shoulder blades.    Discharge Diagnoses - Encounter Date 4/27/20  1. Acute Hypoxic Respiratory Failure  2. Acute Systolic CHF Exacerbation  3. NSTEMI (patient did undergo coronary angiography on 4/28 which showed total occlusion of posterior lateral artery, recommendation for medical management)  4. ESRD on HD  5. HIV  6. Severe Aortic Ttenosis (will need to consider TAVR)  7. S/P PPM for Symptomatic Severe Bradycardia  8. HTN (previously on Coreg 12.5 mg, Avapro 300 mg, Imdur 120 mg, prn clondinine. Had had relative hypotension. At time of discharge he will be on Coreg 9.375 mg BID and Imdur 30 mg every day.)     Allergies:  Calcium Acetate  Contrast Dye  Lisinopril  Sulfa Drugs    Medications:  "   Acetaminophen  Abacavir   Albuterol   Aspirin   Atorvastatin   Carvedilol   Cinacalcet   Clonazepam  Clonidine  Clopidogrel   Dapsone   Darunavir  Dolutegravir   Doxercalciferol   Epoetin brittni  Furosemide   Gabapentin   Insulin aspart   Ipratropium - albuterol  Isosorbide   Melatonin   Mirtazapine   Nitroglycerin   Ondansetron   Pantoprazole   Polyethylene glycol   Ritonavir   Sucroferric oxyhydroxide  Umeclidinium     Past Medical History:    Allergic rhinitis   Anemia due to chronic kidney disease   CAD    CKD   Colon cancer   COPD   Depression   Dilated aortic root   Diverticulitis   ESRD on dialysis   Gout   HIV   Squamous cell carcinoma   HTN  Impotence of organic origin   Increased prostate specific antigen (PSA) velocity   HLD  TIA  DM2  Bilateral pneumonia   Hypoxia   Pulmonary edema   ACS   Cardiac pacemaker in situ   Angina at rest   Sepsis   Expressive aphasia   Respiratory failure   Bronchitis   AIDS  Renal insufficiency     Past Surgical History:    Percutaneous Coronary Angioplasty  Appendectomy  Cholecystectomy  Colostomy  Pacemaker placement  Cardiac Catheterization    Family History:    Brother(s) - Heart Disease (s/p CABG)  Sister(s) - Kidney Disease, HTN    Social History:  Former smoker.  Negative for alcohol use.  Negative for drug use.  PCP: Sukh Owen  Presents alone today.  Marital Status:   [2]     Review of Systems   Constitutional: Negative for chills and fever.   HENT: Negative for sore throat.    Respiratory: Negative for cough.    Cardiovascular: Positive for chest pain.   Gastrointestinal: Negative for blood in stool, diarrhea, nausea and vomiting.   Musculoskeletal: Positive for back pain.   Neurological: Positive for dizziness and light-headedness.   All other systems reviewed and are negative.        Physical Exam     Patient Vitals for the past 24 hrs:   BP Temp Temp src Pulse Resp SpO2   05/01/20 1020 101/54 -- -- -- -- --   05/01/20 1000 96/59 -- -- 83 -- --    05/01/20 0950 116/65 -- -- -- -- 99 %   05/01/20 0930 102/59 -- -- 84 -- --   05/01/20 0850 115/76 -- -- -- -- 98 %   05/01/20 0845 115/76 -- -- 85 -- 98 %   05/01/20 0840 -- -- -- -- -- 99 %   05/01/20 0830 110/67 -- -- 84 -- 99 %   05/01/20 0820 -- -- -- -- -- 99 %   05/01/20 0815 101/63 -- -- 81 -- 96 %   05/01/20 0810 98/60 -- -- 79 -- 94 %   05/01/20 0805 -- -- -- -- -- 94 %   05/01/20 0800 97/52 -- -- 78 -- (!) 86 %   05/01/20 0755 -- -- -- -- -- (!) 84 %   05/01/20 0750 -- -- -- -- -- (!) 89 %   05/01/20 0745 92/72 -- -- 78 -- 95 %   05/01/20 0740 -- -- -- -- -- 90 %   05/01/20 0735 (!) 82/52 -- -- 79 -- 96 %   05/01/20 0730 101/60 -- -- 83 -- 97 %   05/01/20 0725 -- -- -- -- -- 98 %   05/01/20 0721 106/63 98  F (36.7  C) Oral 83 20 95 %   05/01/20 0720 -- -- -- -- -- 96 %       Physical Exam  General: The patient is alert, in no respiratory distress. He has pallor. Dialysis graft in in the left upper arm.     HENT: Mucous membranes dry.    Cardiovascular: Regular rate and rhythm. Good pulses in all four extremities. Normal capillary refill and skin turgor. Systolic murmur.    Respiratory: Lungs are clear. No nasal flaring. No retractions. No wheezing, no crackles.    Gastrointestinal: Abdomen soft. No guarding, no rebound. No palpable hernias. Lower abdominal tenderness.    Musculoskeletal: No gross deformity.     Skin: No rashes or petechiae.     Neurologic: The patient is alert and oriented x3. GCS 15. No testable cranial nerve deficit. Follows commands with clear and appropriate speech. Gives appropriate answers. Good strength in all extremities. No gross neurologic deficit. Gross sensation intact. Pupils are round and reactive. No meningismus.     Lymphatic: No cervical adenopathy. No lower extremity swelling.    Psychiatric: The patient is non-tearful.    Emergency Department Course     ECG:  Time: 0743  Vent. Rate 80 bpm. MD interval 238. QRS duration 122. QT/QTc 410/472. P-R-T axis 45 4 111.  Sinus  rhythm with 1st degree AV block.  Left ventricular hypertrophy with QRS widening and repolarization abnormality.  Cannot rule out Inferior infarct, age undetermined.  Abnormal ECG.  No significant change compared to EKG dated 4/29/20.  Read time: 0752    Imaging:  Radiology findings were communicated with the patient who voiced understanding of the findings.    XR Chest Port 1 View   IMPRESSION: Right chest pacemaker. Cardiomegaly appears stable. Decreased ill-defined bilateral pulmonary opacities. No new focal airspace disease identified.  As per radiology.    CT Abdomen Pelivs w/o Contrast   IMPRESSION:   1.  No specific finding to explain the patient's symptoms.  As per radiology.    Laboratory:  Laboratory findings were communicated with the patient who voiced understanding of the findings.    CBC: WBC: 6.4, HGB: 10.0 (L), PLT: 135 (L)  CMP: Glucose 357 (H), BUN 60 (H), Creatinine 7.08 (H), GFR Estimate 7 (L), ALKPHOS 175 (H), AST 48 (H), o/w WNL    Troponin I (Collected 0742): 3.22 (HH)  Troponin II (Collected 0744): 4.067 (HH)    Lactic acid (resulted 0811): 2.9 (H)    INR: 0.95    Lipase: 244    ABO/Rh Type and Screen: O Positive    Blood Cultures: Pending at time of admission.     Interventions:  0821 NS Bolus 250 mLs IV  0951 Insulin aspart 4 unit subcutaneous    Emergency Department Course:  Past medical records, nursing notes, and vitals reviewed.    0723 I performed an exam of the patient as documented above.     EKG obtained in the ED, see results above.   IV was inserted and blood was drawn for laboratory testing, results above.  The patient was sent for a XR, CT while in the emergency department, results above.     0808 I rechecked the patient and discussed the results of his workup thus far. He noted that his chest pain was fleeting and almost got and that his back pain is gone as well.    0851 I spoke with Dr. Rodriguez, Cardiology, regarding the patient. He would not recommend an echo or any  other acute therapy.    2909 I spoke with Michael GARCIA for Dr. Jhaveri , hospitalist, who agreed to admit the patient.    Findings and plan explained to the Patient who consents to admission. Discussed the patient with Michael GARCIA for Dr. Jhaveri, who will admit the patient to an obs bed for further monitoring, evaluation, and treatment.    I personally reviewed the laboratory and imaging results with the Patient and answered all related questions prior to admission.     Impression & Plan     CMS Diagnoses: The Lactic acid level is elevated due to dehydration, at this time there is no sign of severe sepsis or septic shock.    Medical Decision Making:  The patient has a very complicated past medical history the discharged yesterday due to a hypoxic episode from CHF exacerbation, during that admission he suffered a non-ST elevation MI underwent dialysis, as well as a cardiac catheterization echo that showed severe aortic stenosis.  The reason the patient presented to the ER today was due to hypotension prior to an episode of scheduled dialysis today.  His pressures did rebound his lactic acid was high which I felt was more likely due to dehydration and then to sepsis and that there is no source of infection.  The patient does report recent illness diuresis.  His blood pressure medications have been decreased during his hospital stay therefore medication causes less likely.  I did consider cardiac causes and discussed the case with cardiology who agreed that an echo was unlikely to be helpful and I do not think he needs an emergent cath.  His EKG does not show STEMI his troponin is elevated but is deftly decreased down to 4 from his previous of 10.  The patient had an elevated blood sugar which was treated with insulin here.  He did receive 1 bolus of fluid help with his lactic acid.  I do not think he needs emergent dialysis and the patient was admitted to the hospital in good condition with improved blood pressure.   Of note he did complain of some abdominal pain that if worse he does have a CT does have him which was negative.    Diagnosis:    ICD-10-CM    1. Other specified hypotension  I95.89 Blood culture ONE site     ABO/Rh type and screen     CANCELED: ABO/Rh type and screen   2. Elevated lactic acid level  R79.89    3. RLQ abdominal pain  R10.31    4. ESRD (end stage renal disease) (H)  N18.6        Disposition:  Admitted to obs bed.    Scribe Disclosure:  Aron BARRERA, am serving as a scribe at 7:23 AM on 5/1/2020 to document services personally performed by Glenn Murphy MD based on my observations and the provider's statements to me.          Glenn Murphy MD  05/01/20 [id number]

## 2020-05-01 NOTE — ED NOTES
Assisted with patient triage (ambulance). [employer] monitoring equipment onto Patient (Pulse ox and BP).

## 2020-05-01 NOTE — PROGRESS NOTES
Potassium   Date Value Ref Range Status   05/01/2020 4.3 3.4 - 5.3 mmol/L Final     Comment:     Specimen slightly hemolyzed, potassium may be falsely elevated     Hemoglobin   Date Value Ref Range Status   05/01/2020 10.0 (L) 13.3 - 17.7 g/dL Final     Creatinine   Date Value Ref Range Status   05/01/2020 7.08 (H) 0.66 - 1.25 mg/dL Final     Urea Nitrogen   Date Value Ref Range Status   05/01/2020 60 (H) 7 - 30 mg/dL Final     Sodium   Date Value Ref Range Status   05/01/2020 133 133 - 144 mmol/L Final     INR   Date Value Ref Range Status   05/01/2020 0.95 0.86 - 1.14 Final       DIALYSIS PROCEDURE NOTE  Hepatitis status of previous patient on machine log was checked and verified ok to use with this patients hepatitis status.  Patient dialyzed for 3.15 hrs. on a 3 K bath with a net fluid removal of  1.7L.  A BFR of 450 ml/min was obtained via a FLA using 15 gauge needles.    The treatment plan was discussed with Dr. Marcial during the treatment.  Total heparin received during the treatment: 1700 units.   Needle cannulation sites held x 5 min, pressure dressings applied after hemostasis.    Meds  given: Epogen and Midodrine Complications: none      ICEBOAT? Timeout performed pre-treatment  I: Patient was identified using 2 identifiers  C: Consent obtained/verified current before treatment  E: Equipment preventative maintenance is current and dialysis delivery system OK to use  B: Hepatitis B Surface Antigen: negative; Draw Date: 3/25/19      Hepatitis B Surface Antibody: immune; Draw Date: 11/21/19  O: Dialysis orders present and complete prior to treatment  A: Vascular access verified and assessed prior to treatment  T: Treatment was performed at a clinically appropriate time  ?: Patient was allowed to ask questions and address concerns prior to treatment  See flowsheet in EPIC for further details and post assessment.  Machine water alarm in place and functioning. Transducer pods intact and checked every 15min.  Pt  returned via wheelchair  Report received from: Jorge SILVERMAN  Report given to: Khai SILVERMAN  Chlorine/Chloramine water system checked every 4 hours.  Outpatient Dialysis at [facility]

## 2020-05-01 NOTE — PHARMACY-ADMISSION MEDICATION HISTORY
Admission medication history interview status for this patient is complete. See [facility] admission navigator for allergy information, prior to admission medications and immunization status.     Medication history interview done via telephone during Covid-19 pandemic, indicate source(s): Patient  Medication history resources (including written lists, pill bottles, clinic record): AVS 04/30/2020 and inpatient MAR  Primary pharmacy:    Changes made to PTA medication list: None      Actions taken by pharmacist (provider contacted, etc):None     Additional medication history information: Patient discharged from [facility] yesterday, took PM meds last night, no meds this morning.     Medication reconciliation/reorder completed by provider prior to medication history?  Yes    For patients on insulin therapy: Yes  Sliding scale Novolog: Yes  Do you have a baseline novolog pre-meal dose:   No  Do you eat three meals a day:  3  How many times do you check your blood glucose per day: 3  How many episodes of hypoglycemia do you have per week: Unknown  Do you have a Continuous glucose monitor (CGM) : No (remind pt that not approved for hospital use)  Any specific barriers to therapy?   (cost, comfortable with injections, confident with current diabetes regimen?)      Prior to Admission medications    Medication Sig Last Dose Taking? Auth Provider   abacavir (ZIAGEN) 300 MG tablet Take 600 mg by mouth every evening  4/30/2020 at Unknown time Yes Abstract, Provider   albuterol (PROAIR HFA/PROVENTIL HFA/VENTOLIN HFA) 108 (90 BASE) MCG/ACT Inhaler Inhale 2 puffs into the lungs every 6 hours as needed for shortness of breath / dyspnea or wheezing  Yes Nelson Worthy MD   aspirin 81 MG EC tablet Take 81 mg by mouth every evening 4/30/2020 at Unknown time Yes Unknown, Entered By History   atorvastatin (LIPITOR) 40 MG tablet Take 40 mg by mouth At Bedtime 4/30/2020 at Unknown time Yes Unknown, Entered By History   carvedilol (COREG) 3.125  MG tablet Take 3 tablets (9.375 mg) by mouth 2 times daily (with meals) 4/30/2020 at Unknown time Yes Clemencia Pal MD   chlorhexidine (CHLORHEXIDINE) 0.12 % solution Rinse and gargle 15 ml by mouth twice a day as directed.  Yes Abstract, Provider   cinacalcet (SENSIPAR) 30 MG tablet Take 30 mg by mouth three times a week On dialysis days 4/30/2020 at Unknown time Yes Unknown, Entered By History   CLONAZEPAM PO Take 0.5 mg by mouth nightly as needed for anxiety (restless legs) 4/30/2020 at HS Yes Unknown, Entered By History   clopidogrel (PLAVIX) 75 MG tablet Take 1 tablet (75 mg) by mouth daily 4/30/2020 at AM Yes Meek West MD   dapsone 100 MG tablet Take 100 mg by mouth At Bedtime  4/30/2020 at HS Yes Reported, Patient   Darunavir Ethanolate (PREZISTA PO) Take 800 mg by mouth At Bedtime. 4/30/2020 at HS Yes Reported, Patient   dolutegravir (TIVICAY) 50 MG tablet Take 50 mg by mouth At Bedtime 4/30/2020 at HS Yes Unknown, Entered By History   furosemide (LASIX) 40 MG tablet Take 1 tablet (40 mg) by mouth See Admin Instructions Take one tablet daily on non-dialysis days (Tuesday, Thursday, Saturday and Sunday)  Yes Meek West MD   gabapentin (NEURONTIN) 300 MG capsule Take 300 mg by mouth 2 times daily  4/30/2020 at x2 Yes Unknown, Entered By History   insulin aspart (NOVOLOG FLEXPEN) 100 UNIT/ML pen Inject Subcutaneous 3 times daily (with meals) Take 2 units for every 50 BG above 150 4/30/2020 at x2 Yes Unknown, Entered By History   ipratropium - albuterol 0.5 mg/2.5 mg/3 mL (DUONEB) 0.5-2.5 (3) MG/3ML neb solution Take 1 vial (3 mLs) by nebulization every 6 hours as needed for shortness of breath / dyspnea or wheezing  Yes Catrachito Rosado,    isosorbide mononitrate (IMDUR) 30 MG 24 hr tablet Take 1 tablet (30 mg) by mouth daily 4/30/2020 at AM Yes Clemencia Pal MD   ketoconazole (NIZORAL) 2 % cream Apply topically daily Between toes for fungal infection  Yes Reported, Patient  "  MAGNESIUM OXIDE PO Take 400 mg by mouth At Bedtime 4/30/2020 at HS Yes Unknown, Entered By History   melatonin 5 MG tablet Take 5 mg by mouth nightly as needed  4/30/2020 at HS Yes Reported, Patient   mirtazapine (REMERON) 15 MG tablet Take 15 mg by mouth At Bedtime 4/30/2020 at HS Yes Reported, Patient   nitroglycerin (NITROSTAT) 0.4 MG SL tablet One tablet under the tongue every 5 minutes if needed for chest pain. May repeat every 5 minutes for a maximum of 3 doses in 15 minutes\"  Yes Lay Paz MD   Nutritional Supplements (NEPRO PO) Take 1 Container by mouth 3 times daily 2-3 times daily  Yes Unknown, Entered By History   ondansetron (ZOFRAN-ODT) 4 MG ODT tab Take 4 mg by mouth every 8 hours as needed for nausea  Yes Unknown, Entered By History   pantoprazole (PROTONIX) 40 MG EC tablet Take 40 mg by mouth daily 4/30/2020 at AM Yes Unknown, Entered By History   polyethylene glycol (MIRALAX/GLYCOLAX) packet Take 1 packet by mouth daily as needed for constipation  Yes Reported, Patient   ritonavir (NORVIR) 100 MG capsule Take 1 capsule by mouth At Bedtime  4/30/2020 at HS Yes Reported, Patient   sucroferric oxyhydroxide (VELPHORO) 500 MG CHEW chewable tablet Take 500 mg by mouth 3 times daily (with meals)  4/30/2020 at x2 Yes Unknown, Entered By History   umeclidinium (INCRUSE ELLIPTA) 62.5 MCG/INH inhaler Inhale 1 puff into the lungs daily 4/30/2020 at AM Yes Unknown, Entered By History   cloNIDine (CATAPRES) 0.1 MG tablet Take 0.1 mg by mouth daily as needed (for high blood pressure) If sbp > 180   Unknown, Entered By History   imiquimod (ALDARA) 5 % cream Apply topically as needed   Reported, Patient         "

## 2020-05-01 NOTE — H&P
[facility] Outpatient / Observation Unit  History and Physical Exam     Donald Raza MRN# [id number]   YOB: 1950 Age: 70 year old      Date of Admission:  5/1/2020    Primary care provider: Sukh Owen          Assessment   Donald Raza is a 70 year old male well known to our Hospitalist Service for multiple admissions in the past primarily revolving around chest pain and shortness of breath symptoms. The patient has a complex medical history that includes end-stage renal disease on hemodialysis, HIV; stable on antiretroviral medications, coronary artery disease with previous PCI, and history of aortic valve stenosis, DM, pulmHTN, HTN, COPD, hx of CHF, and ICU admission on BiPAP on 4/27-4/29 for chest pain, SOB and emesis and was found to have hypoxic respiratory failure requiring BiPAP due to acute CHF exacerbation as well as NSTEMI. He underwent coronary angiogram and was found to have an occluded posterior lateral artery, treated medically. He presents to the Emergency Room today with complaints of dizziness and found to have hypotension at dialysis today.   Work up in the ED reveals: VSS. Blood pressure did dip to 82/52 but has more consistently been 100-110/50-70s. Mild hypoxia noted, 86% on room air, placed on 2L supplemental O2, SpO2 98%. CMP is consistent with ESRD, Cr 7.08, BUN 60, alk phos mildly elevated at 175, AST mildly elevated at 48, glucose elevated at 357, otherwise unremarkable, no acute electrolyte abnormalities. Lactic acid was elevated at 2.9, lipase is normal. Troponin is elevated at 4.067. CBC is fairly unremarkable, Hgb at baseline at 10.0. CXR is negative for acute process. CT abd/pelvis is negative for acute process. EKG SR with 1st degree AV block and LVH. Blood cultures pending. He received a 250 mL NS bolus and 4 units of novolog insulin. Patient will be registered to Observation for further work-up and evaluation.     1. Dizziness - due to hypotension. Reports SBP of  65 at dialysis this morning, did not start run. Dizziness is worse with standing. During recent admission, had hypotension as well, meds were adjusted. Coreg reduced from 12.5 mg to 9.125 mg BID and Imdur was reduced from 120 mg daily to 30 mg daily. BP improved in ED, SBP ranging from . Hx of severe aortic stenosis, has outpatient plan for eventual replacement. Will reduce Coreg to 6.25 mg BID and hold Imdur for now. Will consult Nephrology for dialysis and any further medication adjustments.   2. Lactic acidosis - 2.9, unclear etiology, no obvious infectious source, possibly related to hypotension earlier today. Received small fluid bolus in ED.   3. Elevated troponin - recent admission for NSTEMI with peak troponin of 27, continues to trend down. Denies chest pain currently. No need to continue trending unless he develops significant chest pain.  4. ESRD on dialysis - underwent dialysis in hospital on 4/28 and 4/29 due to volume overload leading to acute respiratory failure. Was due for dialysis today, presented to clinic and SBP was 65 so he was sent to the ED instead. Will consult nephrology.   5. CAD - hx of multiple interventions to multiple coronary arteries (most recent intervention 12/2019 to distant right coronary artery, posterolateral artery and posterior descending artery). Had NSTEMI during last admission on 4/27, troponin peaked at 27, underwent coronary angiography on 4/28 which showed total occlusion of posterior lateral artery, recommendation for medical management. Coreg and Imdur reduced, continued ASA, Plavix and statin  6. HTN - now having issues with hypotension. During last admission, Avapro was stopped, Coreg reduced and Imdur reduced. Has clonidine PRN. Will reduce Coreg further to 6.25 mg BID, hold Imdur for now, likely resume on discharge.   7. Severe aortic stenosis - will need to consider TAVR when he recovers from NSTEMI. Cardiology has arranged outpatient follow up  8. COPD -  resume inhalers if available, resume home Duonebs         Plan     1. Registered to Observation  2. Nephrology consult for dialysis  3. Cont supportive treatment, wean O2 as able  4. Reduce Coreg to 6.25 mg BID, hold Imdur  5. Telemetry monitoring                 Chief Complaint:   Dizziness          History of Present Illness:   Donald Raza is a 70 year old male well known to our Hospitalist Service for multiple admissions in the past primarily revolving around chest pain and shortness of breath symptoms. The patient has a complex medical history that includes end-stage renal disease on hemodialysis, HIV; stable on antiretroviral medications, coronary artery disease with previous PCI, and history of aortic valve stenosis, DM, pulmHTN, HTN, COPD, hx of CHF, and ICU admission on BiPAP on 4/27-4/29 for chest pain, SOB and emesis and was found to have hypoxic respiratory failure requiring BiPAP due to acute CHF exacerbation as well as NSTEMI. He underwent coronary angiogram and was found to have an occluded posterior lateral artery, treated medically. He presents to the Emergency Room today with complaints of dizziness and found to have hypotension at dialysis today. Patient is a difficult historian. He is currently in dialysis and sleepy. He states he developed dizziness this morning that was worse with standing up. He denies currently while lying in bed. He went to dialysis and it was noted that his blood pressure was low so he was sent to the ED. He denies dizziness last evening. He was just discharged from the hospital yesterday. He denies fever, chills, chest pain, SOB, abd pain, nausea, vomiting, or diarrhea.          Past Medical History:     Past Medical History:   Diagnosis Date     Allergic rhinitis      Anemia due to chronic kidney disease 10/21/2011     CAD S/P percutaneous coronary angioplasty 06/15/2015     Cataract      CKD (chronic kidney disease)      Colon cancer      COPD      Depression       Dilated aortic root 05/06/2016     Diverticulitis      ESRD (end stage renal disease) on dialysis 05/06/2016     Gout      Human immunodeficiency virus (HIV) disease      Hx of squamous cell carcinoma 03/23/2007     Hypertension 2010     Impotence of organic origin      Increased prostate specific antigen (PSA) velocity 08/08/2016    Awaiting bx on blood thinner     Mixed hyperlipidemia      Pulmonary HTN     Mod     TIA (transient ischemic attack) 5/2016     Type 2 diabetes mellitus age 50               Past Surgical History:     Past Surgical History:   Procedure Laterality Date     ANGIOGRAM  03-04-16    No culprit lesions, stents widely patent      ANGIOGRAM  05-06-16    Cutting balloon ptca=Diag     APPENDECTOMY  2000     CHOLECYSTECTOMY, LAPOROSCOPIC       COLON SURGERY       COLOSTOMY  09/30/1999    Temporary for diverticulitis     CV CORONARY ANGIOGRAM N/A 4/28/2020    Procedure: Coronary Angiogram;  Surgeon: Lc Cardoso MD;  Location:  HEART CARDIAC CATH LAB     EP PACEMAKER N/A 5/2/2019    Procedure: EP Pacemaker;  Surgeon: Angelique Perera MD;  Location:  HEART CARDIAC CATH LAB     HC LEFT HEART CATHETERIZATION  03/12/2018    No significant change  Elevated LVEDp  LVEF 30% No PCI     HEART CATH, ANGIOPLASTY  08-18-16    LAD PCI. Stented with a 3.0 x 8 mm Xience Alpine stent.     IR DIALYSIS FISTULOGRAM LEFT  1/25/2019     STENT, CORONARY, - VANITA=Diag, PTCA=LAD  12/2015     STENT, CORONARY, - VANITA=LAD  06/2015               Social History:     Social History     Socioeconomic History     Marital status:      Spouse name: Not on file     Number of children: Not on file     Years of education: Not on file     Highest education level: Not on file   Occupational History     Not on file   Social Needs     Financial resource strain: Not on file     Food insecurity     Worry: Not on file     Inability: Not on file     Transportation needs     Medical: Not on file     Non-medical: Not  on file   Tobacco Use     Smoking status: Former Smoker     Packs/day: 2.00     Years: 41.00     Pack years: 82.00     Types: Cigarettes     Last attempt to quit: 2003     Years since quittin.3     Smokeless tobacco: Never Used   Substance and Sexual Activity     Alcohol use: No     Alcohol/week: 0.0 standard drinks     Drug use: No     Sexual activity: Not on file   Lifestyle     Physical activity     Days per week: Not on file     Minutes per session: Not on file     Stress: Not on file   Relationships     Social connections     Talks on phone: Not on file     Gets together: Not on file     Attends [employer]ism service: Not on file     Active member of club or organization: Not on file     Attends meetings of clubs or organizations: Not on file     Relationship status: Not on file     Intimate partner violence     Fear of current or ex partner: Not on file     Emotionally abused: Not on file     Physically abused: Not on file     Forced sexual activity: Not on file   Other Topics Concern     Parent/sibling w/ CABG, MI or angioplasty before 65F 55M? Yes      Service Not Asked     Blood Transfusions Not Asked     Caffeine Concern Yes     Comment: 2 cups daily     Occupational Exposure Not Asked     Hobby Hazards Not Asked     Sleep Concern Yes     Stress Concern Not Asked     Weight Concern Not Asked     Special Diet No     Comment:  more proteins     Back Care Not Asked     Exercise Yes     Comment: Cardiac rehab      Bike Helmet Not Asked     Seat Belt Not Asked     Self-Exams Not Asked   Social History Narrative     Not on file               Family History:     Family History   Problem Relation Age of Onset     Heart Disease Brother 40        CABG     Kidney Disease Sister      Hypertension Sister      Heart Disease Brother         Dilated aorta              Allergies:      Allergies   Allergen Reactions     Calcium Acetate Other (See Comments)     Other reaction(s): Other, see comments  Pain in chest  and back  Pain in chest area (sensitive skin)      Contrast Dye Other (See Comments)     Contrast nephropathy     Diagnostic X-Ray Materials Other (See Comments)     PN: renal failure     Lisinopril      Sulfa Drugs                Medications:     Prior to Admission medications    Medication Sig Last Dose Taking? Auth Provider   abacavir (ZIAGEN) 300 MG tablet Take 600 mg by mouth every evening    Abstract, Provider   albuterol (PROAIR HFA/PROVENTIL HFA/VENTOLIN HFA) 108 (90 BASE) MCG/ACT Inhaler Inhale 2 puffs into the lungs every 6 hours as needed for shortness of breath / dyspnea or wheezing   Nelson Worthy MD   aspirin 81 MG EC tablet Take 81 mg by mouth every evening   Unknown, Entered By History   atorvastatin (LIPITOR) 40 MG tablet Take 40 mg by mouth At Bedtime   Unknown, Entered By History   carvedilol (COREG) 3.125 MG tablet Take 3 tablets (9.375 mg) by mouth 2 times daily (with meals)   Clemencia Pal MD   chlorhexidine (CHLORHEXIDINE) 0.12 % solution Rinse and gargle 15 ml by mouth twice a day as directed.   Abstract, Provider   cinacalcet (SENSIPAR) 30 MG tablet Take 30 mg by mouth three times a week On dialysis days   Unknown, Entered By History   CLONAZEPAM PO Take 0.5 mg by mouth nightly as needed for anxiety (restless legs)   Unknown, Entered By History   cloNIDine (CATAPRES) 0.1 MG tablet Take 0.1 mg by mouth daily as needed (for high blood pressure) If sbp > 180   Unknown, Entered By History   clopidogrel (PLAVIX) 75 MG tablet Take 1 tablet (75 mg) by mouth daily   Meek West MD   dapsone 100 MG tablet Take 100 mg by mouth At Bedtime    Reported, Patient   Darunavir Ethanolate (PREZISTA PO) Take 800 mg by mouth At Bedtime.   Reported, Patient   dolutegravir (TIVICAY) 50 MG tablet Take 50 mg by mouth At Bedtime   Unknown, Entered By History   DOXERCALCIFEROL IV (given at dialysis)_   Reported, Patient   epoetin brittni (EPOGEN,PROCRIT) 27129 UNIT/ML injection WITH DIALYSIS;    "Unknown, Entered By History   furosemide (LASIX) 40 MG tablet Take 1 tablet (40 mg) by mouth See Admin Instructions Take one tablet daily on non-dialysis days (Tuesday, Thursday, Saturday and Sunday)   Meek West MD   gabapentin (NEURONTIN) 300 MG capsule Take 300 mg by mouth 2 times daily    Unknown, Entered By History   imiquimod (ALDARA) 5 % cream Apply topically as needed   Reported, Patient   insulin aspart (NOVOLOG FLEXPEN) 100 UNIT/ML pen Inject Subcutaneous 3 times daily (with meals) Take 2 units for every 50 BG above 150   Unknown, Entered By History   ipratropium - albuterol 0.5 mg/2.5 mg/3 mL (DUONEB) 0.5-2.5 (3) MG/3ML neb solution Take 1 vial (3 mLs) by nebulization every 6 hours as needed for shortness of breath / dyspnea or wheezing   Catrachito Rosado,    isosorbide mononitrate (IMDUR) 30 MG 24 hr tablet Take 1 tablet (30 mg) by mouth daily   Clemencia Pal MD   ketoconazole (NIZORAL) 2 % cream Apply topically daily Between toes for fungal infection   Reported, Patient   MAGNESIUM OXIDE PO Take 400 mg by mouth At Bedtime   Unknown, Entered By History   melatonin 5 MG tablet Take 5 mg by mouth nightly as needed    Reported, Patient   mirtazapine (REMERON) 15 MG tablet Take 15 mg by mouth At Bedtime   Reported, Patient   nitroglycerin (NITROSTAT) 0.4 MG SL tablet One tablet under the tongue every 5 minutes if needed for chest pain. May repeat every 5 minutes for a maximum of 3 doses in 15 minutes\"   Lay Paz MD   Nutritional Supplements (NEPRO PO) Take 1 Container by mouth 3 times daily 2-3 times daily   Unknown, Entered By History   ondansetron (ZOFRAN-ODT) 4 MG ODT tab Take 4 mg by mouth every 8 hours as needed for nausea   Unknown, Entered By History   pantoprazole (PROTONIX) 40 MG EC tablet Take 40 mg by mouth daily   Unknown, Entered By History   polyethylene glycol (MIRALAX/GLYCOLAX) packet Take 1 packet by mouth daily as needed for constipation   Reported, Patient "   ritonavir (NORVIR) 100 MG capsule Take 1 capsule by mouth At Bedtime    Reported, Patient   sucroferric oxyhydroxide (VELPHORO) 500 MG CHEW chewable tablet Take 500 mg by mouth 3 times daily (with meals)    Unknown, Entered By History   umeclidinium (INCRUSE ELLIPTA) 62.5 MCG/INH inhaler Inhale 1 puff into the lungs daily   Unknown, Entered By History              Review of Systems:   A Comprehensive greater than 10 system review of systems was carried out.  Pertinent positives and negatives are noted above.  Otherwise negative for contributory information.     CV: NEGATIVE for chest pain, palpitations or peripheral edema  C: NEGATIVE for fever, chills, change in weight  E/M: NEGATIVE for ear, mouth and throat problems  R: NEGATIVE for significant cough or SOB    NEURO ROS: negative except for dizziness             Physical Exam:   Blood pressure 115/76, pulse 85, temperature 98  F (36.7  C), temperature source Oral, resp. rate 20, SpO2 98 %.    GENERAL:  Comfortable.  PSYCH: pleasant, oriented, No acute distress.  HEENT:  Atraumatic, normocephalic. Normal conjunctiva, normal hearing, and oropharynx is normal.  NECK:  Supple, no neck vein distention  HEART:  Normal S1, S2 with murmur, no pericardial rub, gallops or S3 or S4.  LUNGS:  Clear to auscultation, normal Respiratory effort. No wheezing, rales or ronchi.  GI:  Soft, normal bowel sounds. Non-tender, non distended.   EXTREMITIES:  No pedal edema, +2 pulses bilateral and equal.  SKIN:  Dry to touch, No rash, wound or ulcerations.  NEUROLOGIC:  CN 2-12 intact, BL 5/5 symmetric upper and lower extremity strength, sensation is intact with no focal deficits.             Data:     EKG demonstrates:  Sinus Rhythm with 1st degree AV block, LVH, unchanged from previous tracings.    Recent Labs   Lab 05/01/20  0744 04/30/20  1153 04/28/20  0555   WBC 6.4 5.4 9.8   HGB 10.0* 10.2* 10.1*   HCT 33.9* 35.2* 34.7*   MCV 94 95 94   * 121* 141*     Recent Labs   Lab  05/01/20  0744 04/30/20  1153 04/29/20  0640  04/27/20  2349    133 132*   < > 127*   POTASSIUM 4.3 4.0 4.4   < > 4.8   CHLORIDE 94 96 96   < > 90*   CO2 25 29 30   < > 25   ANIONGAP 14 8 6   < > 12   * 203* 173*   < > 365*   BUN 60* 40* 33*   < > 48*   CR 7.08* 5.68* 5.64*   < > 6.41*   GFRESTIMATED 7* 9* 9*   < > 8*   GFRESTBLACK 8* 11* 11*   < > 9*   PRABHA 9.2 9.8 9.3   < > 9.4   MAG  --   --   --   --  2.4*   PROTTOTAL 7.9  --   --   --  8.4   ALBUMIN 3.4  --   --   --  3.7   BILITOTAL 0.6  --   --   --  0.5   ALKPHOS 175*  --   --   --  169*   AST 48*  --   --   --  15   ALT 21  --   --   --  14    < > = values in this interval not displayed.     Recent Labs   Lab 04/27/20  2349   NTBNPI 59,540*     Recent Labs   Lab 05/01/20  0744   INR 0.95     Recent Labs   Lab 05/01/20  0744   LACT 2.9*     Recent Labs   Lab 05/01/20  0744   LIPASE 244     Recent Labs   Lab 05/01/20  0744 05/01/20  0742 04/29/20  0640 04/28/20  1753   TROPONIN  --  3.22*  --   --    TROPI 4.067*  --  9.935* 22.139*       Recent Results (from the past 48 hour(s))   XR Chest Port 1 View    Narrative    CHEST PORTABLE ONE VIEW May 1, 2020 8:40 AM     HISTORY: Lightheaded, end-stage renal disease, history of congestive  heart failure.     COMPARISON: 4/27/2020.      Impression    IMPRESSION: Right chest pacemaker. Cardiomegaly appears stable.  Decreased ill-defined bilateral pulmonary opacities. No new focal  airspace disease identified.   CT Abdomen Pelvis w/o Contrast    Narrative    CT ABDOMEN PELVIS W/O CONTRAST 5/1/2020 9:10 AM    CLINICAL HISTORY: hypotension, lower abd tenderness  TECHNIQUE: CT scan of the abdomen and pelvis was performed without IV  contrast. Multiplanar reformats were obtained. Dose reduction  techniques were used.  CONTRAST: None.    COMPARISON: 9/1/2018    FINDINGS:   LOWER CHEST: Cardiac pacemaker partially seen. Aortic valve  calcifications.    HEPATOBILIARY: Cholecystectomy.    PANCREAS:  Normal.    SPLEEN: Normal.    ADRENAL GLANDS: Normal.    KIDNEYS/BLADDER: Both kidneys are atrophic with multiple cysts,  similar to previous.    BOWEL: Normal.    LYMPH NODES: Normal.    VASCULATURE: Diffuse atherosclerotic calcification. Minimal stranding  around the left common femoral artery from recent catheterization. No  significant hematoma.    PELVIC ORGANS: Mild prostate gland enlargement.    OTHER: None.    MUSCULOSKELETAL: Normal.      Impression    IMPRESSION:   1.  No specific finding to explain the patient's symptoms.    MD Noemi SILVA PA-C     This patient was discussed with Dr. Jhaveri who agrees with the current plans as outlined above.

## 2020-05-01 NOTE — ED NOTES
[facility]  ED Nurse Handoff Report    Donald Raza is a 70 year old male   ED Chief complaint: Hypotension  . ED Diagnosis:   Final diagnoses:   Other specified hypotension   Elevated lactic acid level   RLQ abdominal pain   ESRD (end stage renal disease) (H)     Allergies:   Allergies   Allergen Reactions     Calcium Acetate Other (See Comments)     Other reaction(s): Other, see comments  Pain in chest and back  Pain in chest area (sensitive skin)      Contrast Dye Other (See Comments)     Contrast nephropathy     Diagnostic X-Ray Materials Other (See Comments)     PN: renal failure     Lisinopril      Sulfa Drugs        Code Status: Full Code  Activity level - Baseline/Home:  Assist X 1. Activity Level - Current:   Assist X 1. Lift room needed: No. Bariatric: No   Needed: No   Isolation: No. Infection: Not Applicable.     Vital Signs:   Vitals:    05/01/20 0810 05/01/20 0815 05/01/20 0830 05/01/20 0845   BP: 98/60 101/63 110/67 115/76   Pulse: 79 81 84 85   Resp:       Temp:       TempSrc:       SpO2: 94% 96% 99% 98%       Cardiac Rhythm:  ,      Pain level:    Patient confused: No. Patient Falls Risk: Yes.   Elimination Status: pt on hemodialysis   Patient Report - Initial Complaint: hypotension. Focused Assessment: multiple   Tests Performed:   CT Abdomen Pelvis w/o Contrast   Final Result   IMPRESSION:    1.  No specific finding to explain the patient's symptoms.      AUDELIA CHEN MD      XR Chest Port 1 View   Preliminary Result   IMPRESSION: Right chest pacemaker. Cardiomegaly appears stable.   Decreased ill-defined bilateral pulmonary opacities. No new focal   airspace disease identified.        . Abnormal Results:   Labs Ordered and Resulted from Time of ED Arrival Up to the Time of Departure from the ED   CBC WITH PLATELETS DIFFERENTIAL - Abnormal; Notable for the following components:       Result Value    RBC Count 3.60 (*)     Hemoglobin 10.0 (*)     Hematocrit 33.9 (*)      MCHC 29.5 (*)     RDW 18.3 (*)     Platelet Count 135 (*)     All other components within normal limits   COMPREHENSIVE METABOLIC PANEL - Abnormal; Notable for the following components:    Glucose 357 (*)     Urea Nitrogen 60 (*)     Creatinine 7.08 (*)     GFR Estimate 7 (*)     GFR Estimate If Black 8 (*)     Alkaline Phosphatase 175 (*)     AST 48 (*)     All other components within normal limits   LACTIC ACID WHOLE BLOOD - Abnormal; Notable for the following components:    Lactic Acid 2.9 (*)     All other components within normal limits   TROPONIN I - Abnormal; Notable for the following components:    Troponin I ES 4.067 (*)     All other components within normal limits   TROPONIN POCT - Abnormal; Notable for the following components:    Troponin I 3.22 (*)     All other components within normal limits   INR   LIPASE   PERIPHERAL IV CATHETER   ISTAT TROPONIN NURSING POCT   ABO/RH TYPE AND SCREEN   ABO/RH TYPE AND SCREEN   BLOOD CULTURE   BLOOD CULTURE     .   Treatments provided: see mar  Family Comments: none  OBS brochure/video discussed/provided to patient: yes  ED Medications:   Medications   lidocaine 1 % 0.1-1 mL (has no administration in time range)   lidocaine (LMX4) cream (has no administration in time range)   sodium chloride (PF) 0.9% PF flush 3 mL (has no administration in time range)   sodium chloride (PF) 0.9% PF flush 3 mL ( Intracatheter Canceled Entry 5/1/20 0900)   insulin aspart (NovoLOG) injection (RAPID ACTING) (has no administration in time range)   0.9% sodium chloride BOLUS (0 mLs Intravenous Stopped 5/1/20 0859)     Drips infusing:  No  For the majority of the shift, the patient's behavior Green. Interventions performed were see mar.    Sepsis treatment initiated: No       ED Nurse Name/Phone Number: Aarti Crawford RN,   9:24 AM    RECEIVING UNIT ED HANDOFF REVIEW    Above ED Nurse Handoff Report was reviewed: yes  Reviewed by: Africa Thomas RN on May 1, 2020 at 10:51 AM

## 2020-05-02 NOTE — PLAN OF CARE
PRIMARY DIAGNOSIS: Hypotension     OUTPATIENT/OBSERVATION GOALS TO BE MET BEFORE DISCHARGE  1. Orthostatic performed: No     2. Tolerating PO medications: Yes     3. Return to near baseline physical activity: Yes     4. Cleared for discharge by consultants (if involved): No         Discharge Planner Nurse   Safe discharge environment identified: Yes  Barriers to discharge: No       Entered by: Roscoe Carnes RN      Please review provider order for any additional goals.   Nurse to notify provider when observation goals have been met and patient is ready for discharge.        Vitals are Temp: 98.2  F (36.8  C) Temp src: Oral BP: 122/56 Pulse: 76 Heart Rate: 90 Resp: 16 SpO2: 91 %.  Patient is Alert and Oriented x4. They are SBA with Gait Belt and Walker.  Pt is a Diabetic diet.  They are denying pain.  Patient is Saline locked. BP improving, held all  BP meds. Pt had a run of 2.5 minutes of VTACH overnight, MD aware. Was alert and oriented once aroused with sternal rub. VSS. Will continue to monitor.

## 2020-05-02 NOTE — PLAN OF CARE
PRIMARY DIAGNOSIS: Hypotension     OUTPATIENT/OBSERVATION GOALS TO BE MET BEFORE DISCHARGE  1. Orthostatic performed: No     2. Tolerating PO medications: Yes     3. Return to near baseline physical activity: Yes     4. Cleared for discharge by consultants (if involved): No         Discharge Planner Nurse   Safe discharge environment identified: Yes  Barriers to discharge: No       Entered by: Roscoe Carnes RN      Please review provider order for any additional goals.   Nurse to notify provider when observation goals have been met and patient is ready for discharge.

## 2020-05-02 NOTE — DISCHARGE SUMMARY
[facility]  Discharge Summary        Donald Raza MRN# [id number]   YOB: 1950 Age: 70 year old     Date of Admission:  5/1/2020  Date of Discharge:  5/2/2020  Admitting Physician:  Lorna Jhaveri MD  Discharge Physician: Soraya Conley PA-C  Discharging Service: Hospitalist     Primary Provider: Sukh Owen  Primary Care Physician Phone Number: [phone]         Discharge Diagnoses/Problem Oriented Hospital Course (Providers):    Donald Raza was admitted on 5/1/2020 by Lorna Jhaveri MD and I would refer you to their history and physical.  The following problems were addressed during his hospitalization:    1. Episode of dizziness and hypotension during dialysis--now resolved.   2. Episode of reported V-tach--strip evaluated and showed wide complex rhythm at HR 90. Seen by Cardiology and felt it could be  AIVR/ paced rhythm or Slow VT. See Cardiology eval.   3. Recent discharge from hospital (4/27-4/29) for hypoxic respiratory failure due to CHF exacerbation and NSTEMI           Code Status:      Full Code        Brief Hospital Stay Summary Sent Home With Patient in AVS:        Reason for your hospital stay      You were admitted for concerns of hypotension in the setting of dialysis.   Your BP med was held and your symptoms were improved. You were seen by the   cardiologist and we recommend close follow up. We decreased your Coreg to   your original 6.25mg twice a day and ok to resume Imdur as well as your   other parameters. Make appt with Dr. Diaz as scheduled. Check your BP once   a day.       Hospital Course  This gentleman was admitted with symptomatic hypotension following dialysis yesterday. He had a run of wide complex rhythm yesterday lasting approximately 3 minutes.  I personally reviewed the findings on telemetry.  It is a wide complex rhythm at a rate of 90. The patient himself was minimally symptomatic during this episode.  He have a  "little bit of chest discomfort during it, but nothing severe.  He denies losing consciousness.     Cardiology was consulted. Pls see eval. Per  Ip: \"recent pacemaker interrogation demonstrated normal function.  This could either be AIVR or paced rhythm or Slow VT in the setting of hypotension and known coronary artery disease, together with severe aortic stenosis would not be an unexpected finding.\"  \"I would definitely recommend conservative management.  If hypotension is an issue the beta blocker dose can be reduced.\"    Pt remained asymptomatic. His BP improved with reduced Coreg. He will be discharged and follow up with Cardiology in 2 weeks.             Important Results:      Recent Labs   Lab 05/02/20  0535 05/01/20  0744 04/30/20  1153   WBC 6.0 6.4 5.4   HGB 10.3* 10.0* 10.2*   HCT 35.0* 33.9* 35.2*   MCV 93 94 95   * 135* 121*     Recent Labs   Lab 05/02/20  0535 05/01/20  0744 04/30/20  1153    133 133   POTASSIUM 3.6 4.3 4.0   CHLORIDE 96 94 96   CO2 29 25 29   ANIONGAP 8 14 8   * 357* 203*   BUN 41* 60* 40*   CR 5.06* 7.08* 5.68*   GFRESTIMATED 11* 7* 9*   GFRESTBLACK 12* 8* 11*   PRABHA 9.2 9.2 9.8     Recent Labs   Lab 05/01/20  0921 05/01/20  0744   CULT No growth after 22 hours No growth after 1 day       Recent Labs   Lab 05/01/20  0744 05/01/20  0742 04/29/20  0640 04/28/20  1753   TROPONIN  --  3.22*  --   --    TROPI 4.067*  --  9.935* 22.139*            Pending Results:        Unresulted Labs Ordered in the Past 30 Days of this Admission     Date and Time Order Name Status Description    5/1/2020 0802 Blood culture ONE site Preliminary     5/1/2020 0737 Blood culture Preliminary             Discharge Instructions and Follow-Up:      Follow-up Appointments     Follow-up and recommended labs and tests       Follow up with primary care provider, Sukh Owen, within 7 days for   hospital follow- up.  No follow up labs or test are needed.  F/u with Dr. Diaz as instructed.   "             Discharge Disposition:      Discharged to home         Discharge Medications:        Current Discharge Medication List      CONTINUE these medications which have CHANGED    Details   carvedilol (COREG) 3.125 MG tablet Take 2 tablets (6.25 mg) by mouth 2 times daily (with meals)  Qty: 180 tablet, Refills: 0    Comments: Hold for sbp <110  Associated Diagnoses: Hypertensive emergency         CONTINUE these medications which have NOT CHANGED    Details   abacavir (ZIAGEN) 300 MG tablet Take 600 mg by mouth every evening       albuterol (PROAIR HFA/PROVENTIL HFA/VENTOLIN HFA) 108 (90 BASE) MCG/ACT Inhaler Inhale 2 puffs into the lungs every 6 hours as needed for shortness of breath / dyspnea or wheezing  Qty: 1 Inhaler, Refills: 1    Associated Diagnoses: Cough      aspirin 81 MG EC tablet Take 81 mg by mouth every evening      atorvastatin (LIPITOR) 40 MG tablet Take 40 mg by mouth At Bedtime      chlorhexidine (CHLORHEXIDINE) 0.12 % solution Rinse and gargle 15 ml by mouth twice a day as directed.      cinacalcet (SENSIPAR) 30 MG tablet Take 30 mg by mouth three times a week On dialysis days      CLONAZEPAM PO Take 0.5 mg by mouth nightly as needed for anxiety (restless legs)      clopidogrel (PLAVIX) 75 MG tablet Take 1 tablet (75 mg) by mouth daily  Qty: 30 tablet, Refills: 1    Associated Diagnoses: Coronary artery disease involving native coronary artery of native heart without angina pectoris      dapsone 100 MG tablet Take 100 mg by mouth At Bedtime       Darunavir Ethanolate (PREZISTA PO) Take 800 mg by mouth At Bedtime.      dolutegravir (TIVICAY) 50 MG tablet Take 50 mg by mouth At Bedtime      furosemide (LASIX) 40 MG tablet Take 1 tablet (40 mg) by mouth See Admin Instructions Take one tablet daily on non-dialysis days (Tuesday, Thursday, Saturday and Sunday)  Qty: 60 tablet, Refills: 1    Associated Diagnoses: Severe aortic stenosis      gabapentin (NEURONTIN) 300 MG capsule Take 300 mg by  "mouth 2 times daily       insulin aspart (NOVOLOG FLEXPEN) 100 UNIT/ML pen Inject Subcutaneous 3 times daily (with meals) Take 2 units for every 50 BG above 150      ipratropium - albuterol 0.5 mg/2.5 mg/3 mL (DUONEB) 0.5-2.5 (3) MG/3ML neb solution Take 1 vial (3 mLs) by nebulization every 6 hours as needed for shortness of breath / dyspnea or wheezing  Qty: 90 vial, Refills: 1    Associated Diagnoses: Acute respiratory failure with hypoxia (H)      isosorbide mononitrate (IMDUR) 30 MG 24 hr tablet Take 1 tablet (30 mg) by mouth daily  Qty: 30 tablet, Refills: 0    Associated Diagnoses: Coronary artery disease involving native coronary artery of native heart without angina pectoris      ketoconazole (NIZORAL) 2 % cream Apply topically daily Between toes for fungal infection      MAGNESIUM OXIDE PO Take 400 mg by mouth At Bedtime      melatonin 5 MG tablet Take 5 mg by mouth nightly as needed       mirtazapine (REMERON) 15 MG tablet Take 15 mg by mouth At Bedtime      nitroglycerin (NITROSTAT) 0.4 MG SL tablet One tablet under the tongue every 5 minutes if needed for chest pain. May repeat every 5 minutes for a maximum of 3 doses in 15 minutes\"  Qty: 25 tablet, Refills: 3    Associated Diagnoses: Coronary artery disease due to lipid rich plaque; Status post coronary angioplasty      Nutritional Supplements (NEPRO PO) Take 1 Container by mouth 3 times daily 2-3 times daily      ondansetron (ZOFRAN-ODT) 4 MG ODT tab Take 4 mg by mouth every 8 hours as needed for nausea      pantoprazole (PROTONIX) 40 MG EC tablet Take 40 mg by mouth daily      polyethylene glycol (MIRALAX/GLYCOLAX) packet Take 1 packet by mouth daily as needed for constipation      ritonavir (NORVIR) 100 MG capsule Take 1 capsule by mouth At Bedtime       sucroferric oxyhydroxide (VELPHORO) 500 MG CHEW chewable tablet Take 500 mg by mouth 3 times daily (with meals)       umeclidinium (INCRUSE ELLIPTA) 62.5 MCG/INH inhaler Inhale 1 puff into the lungs " daily      cloNIDine (CATAPRES) 0.1 MG tablet Take 0.1 mg by mouth daily as needed (for high blood pressure) If sbp > 180      imiquimod (ALDARA) 5 % cream Apply topically as needed               Allergies:         Allergies   Allergen Reactions     Calcium Acetate Other (See Comments)     Other reaction(s): Other, see comments  Pain in chest and back  Pain in chest area (sensitive skin)      Contrast Dye Other (See Comments)     Contrast nephropathy     Diagnostic X-Ray Materials Other (See Comments)     PN: renal failure     Lisinopril      Sulfa Drugs            Consultations This Hospital Stay:      Consultation during this admission received from cardiology         Condition and Physical on Discharge:      Discharge condition: Stable   Vitals: Blood pressure 126/69, pulse 76, temperature 97.9  F (36.6  C), temperature source Oral, resp. rate 16, weight 88.7 kg (195 lb 8 oz), SpO2 92 %.  195 lbs 8 oz      GENERAL:  Comfortable.  PSYCH: pleasant, oriented, No acute distress.  HEENT:  PERRLA. Normal conjunctiva, normal hearing, nasal mucosa and Oropharynx are normal.  NECK:  Supple, no neck vein distention, adenopathy or bruits, normal thyroid.  HEART:  Normal S1, S2 with no murmur, no pericardial rub, gallops or S3 or S4.  LUNGS:  Dec bs at bases with sl crackles, normal Respiratory effort. No wheezing, rales or ronchi.  ABDOMEN:  Soft, no hepatosplenomegaly, normal bowel sounds. Non-tender, non distended.   EXTREMITIES:  No pedal edema, +2 pulses bilateral and equal.  SKIN:  Dry to touch, No rash, wound or ulcerations.  NEUROLOGIC:  CN 2-12 intact, BL 5/5 symmetric upper and lower extremity strength, sensation is intact with no focal deficits.         Discharge Time:      <30 mins        Image Results From This Hospital Stay (For Non-EPIC Providers):        Results for orders placed or performed during the hospital encounter of 05/01/20   XR Chest Port 1 View    Narrative    CHEST PORTABLE ONE VIEW May 1, 2020  8:40 AM     HISTORY: Lightheaded, end-stage renal disease, history of congestive  heart failure.     COMPARISON: 4/27/2020.      Impression    IMPRESSION: Right chest pacemaker. Cardiomegaly appears stable.  Decreased ill-defined bilateral pulmonary opacities. No new focal  airspace disease identified.    JONY ESPAÑA MD   CT Abdomen Pelvis w/o Contrast    Narrative    CT ABDOMEN PELVIS W/O CONTRAST 5/1/2020 9:10 AM    CLINICAL HISTORY: hypotension, lower abd tenderness  TECHNIQUE: CT scan of the abdomen and pelvis was performed without IV  contrast. Multiplanar reformats were obtained. Dose reduction  techniques were used.  CONTRAST: None.    COMPARISON: 9/1/2018    FINDINGS:   LOWER CHEST: Cardiac pacemaker partially seen. Aortic valve  calcifications.    HEPATOBILIARY: Cholecystectomy.    PANCREAS: Normal.    SPLEEN: Normal.    ADRENAL GLANDS: Normal.    KIDNEYS/BLADDER: Both kidneys are atrophic with multiple cysts,  similar to previous.    BOWEL: Normal.    LYMPH NODES: Normal.    VASCULATURE: Diffuse atherosclerotic calcification. Minimal stranding  around the left common femoral artery from recent catheterization. No  significant hematoma.    PELVIC ORGANS: Mild prostate gland enlargement.    OTHER: None.    MUSCULOSKELETAL: Normal.      Impression    IMPRESSION:   1.  No specific finding to explain the patient's symptoms.    AUDELIA CHEN MD

## 2020-05-02 NOTE — PROGRESS NOTES
Patient's After Visit Summary was reviewed with patient.  Patient verbalized understanding of After Visit Summary, recommended follow up and was given an opportunity to ask questions.   Discharge medications sent home with patient/family: No  Discharged with spouse via private vehicle.     OBSERVATION patient END time: 10:20 AM

## 2020-05-02 NOTE — CONSULTS
[facility]    Cardiology Consultation     Date of Admission:  5/1/2020    Primary Care Physician   Sukh Owen     Consult Date:  05/02/2020      REASON FOR CONSULTATION:  Ventricular tachycardia.      REFERRING PHYSICIAN:  Hospitalist Service.      IMPRESSION:   1.  Wide complex rhythm, likely AIVR but cannot exclude a paced rhythm.   2.  Pacemaker in place for sick sinus syndrome.   3.  Chronic coronary artery disease with most recent coronary angiography demonstrating patency of stents and chronic occlusion of the posterolateral (PL) branch.  Overall left ventricular systolic function is low normal.   4.  Severe aortic stenosis.   5.  End-stage renal disease, on regular hemodialysis.   6.  Human immunodeficiency virus (HIV) positive.   7.  History of diastolic heart failure.   8.  Chronic obstructive pulmonary disease (COPD).      This gentleman was admitted with symptomatic hypotension following dialysis yesterday.  He had a run of wide complex rhythm yesterday lasting approximately 3 minutes.  I personally reviewed the findings on telemetry.  It is a wide complex rhythm at a rate of 90.  The patient himself was minimally symptomatic during this episode.  He have a little bit of chest discomfort during it, but nothing severe.  He denies losing consciousness.      Recent pacemaker interrogation demonstrated normal function.  I think there are 2 possibilities.  This could either be AIVR or paced rhythm.  Slow VT in the setting of hypotension and known coronary artery disease, together with severe aortic stenosis would not be an unexpected finding.  I would definitely recommend conservative management.  If hypotension is an issue the beta blocker dose can be reduced to mild.  His Imdur could also be stopped if necessary.      From our point of view he can be discharged.  I will arrange for remote pacer interrogation in the next week and there has been mention of possible TAVR.  He will be  followed up in clinic.  It will be my pleasure to be involved in this delightful gentleman's care.      HISTORY OF PRESENT ILLNESS:  This is a very pleasant 70-year-old gentleman with multiple medical illnesses as stated above.  He has had multiple admissions to this hospital in the past year and he is well known to our service.      He was admitted yesterday again with dizziness and this occurred in the setting of dialysis.  He was noted to have a wide complex rhythm as stated above.  The patient says he was resting and all of a sudden nursing staff rushed into his room.  He thinks he may have had a little bit more chest tightness than usual, but nothing out of the ordinary.  He did not lose consciousness and did not have any dizzy spells.     Past Medical History   I have reviewed this patient's medical history and updated it with pertinent information if needed.   Past Medical History:   Diagnosis Date     Allergic rhinitis      Anemia due to chronic kidney disease 10/21/2011     CAD S/P percutaneous coronary angioplasty 06/15/2015     Cataract      CKD (chronic kidney disease)      Colon cancer      COPD      Depression      Dilated aortic root 05/06/2016     Diverticulitis      ESRD (end stage renal disease) on dialysis 05/06/2016     Gout      Human immunodeficiency virus (HIV) disease      Hx of squamous cell carcinoma 03/23/2007     Hypertension 2010     Impotence of organic origin      Increased prostate specific antigen (PSA) velocity 08/08/2016    Awaiting bx on blood thinner     Mixed hyperlipidemia      Pulmonary HTN     Mod     TIA (transient ischemic attack) 5/2016     Type 2 diabetes mellitus age 50       Past Surgical History   I have reviewed this patient's surgical history and updated it with pertinent information if needed.  Past Surgical History:   Procedure Laterality Date     ANGIOGRAM  03-04-16    No culprit lesions, stents widely patent      ANGIOGRAM  05-06-16    Cutting balloon ptca=Diag      APPENDECTOMY  2000     CHOLECYSTECTOMY, LAPOROSCOPIC       COLON SURGERY       COLOSTOMY  09/30/1999    Temporary for diverticulitis     CV CORONARY ANGIOGRAM N/A 4/28/2020    Procedure: Coronary Angiogram;  Surgeon: Lc Cardoso MD;  Location:  HEART CARDIAC CATH LAB     EP PACEMAKER N/A 5/2/2019    Procedure: EP Pacemaker;  Surgeon: Angelique Perera MD;  Location:  HEART CARDIAC CATH LAB     HC LEFT HEART CATHETERIZATION  03/12/2018    No significant change  Elevated LVEDp  LVEF 30% No PCI     HEART CATH, ANGIOPLASTY  08-18-16    LAD PCI. Stented with a 3.0 x 8 mm Xience Alpine stent.     IR DIALYSIS FISTULOGRAM LEFT  1/25/2019     STENT, CORONARY, - VANITA=Diag, PTCA=LAD  12/2015     STENT, CORONARY, - VANITA=LAD  06/2015       Prior to Admission Medications   Prior to Admission Medications   Prescriptions Last Dose Informant Patient Reported? Taking?   CLONAZEPAM PO 4/30/2020 at HS Self Yes Yes   Sig: Take 0.5 mg by mouth nightly as needed for anxiety (restless legs)   Darunavir Ethanolate (PREZISTA PO) 4/30/2020 at HS Self Yes Yes   Sig: Take 800 mg by mouth At Bedtime.   MAGNESIUM OXIDE PO 4/30/2020 at HS Self Yes Yes   Sig: Take 400 mg by mouth At Bedtime   Nutritional Supplements (NEPRO PO)  Self Yes Yes   Sig: Take 1 Container by mouth 3 times daily 2-3 times daily   abacavir (ZIAGEN) 300 MG tablet 4/30/2020 at Unknown time Self Yes Yes   Sig: Take 600 mg by mouth every evening    albuterol (PROAIR HFA/PROVENTIL HFA/VENTOLIN HFA) 108 (90 BASE) MCG/ACT Inhaler  Self No Yes   Sig: Inhale 2 puffs into the lungs every 6 hours as needed for shortness of breath / dyspnea or wheezing   aspirin 81 MG EC tablet 4/30/2020 at Unknown time  Yes Yes   Sig: Take 81 mg by mouth every evening   atorvastatin (LIPITOR) 40 MG tablet 4/30/2020 at Unknown time Self Yes Yes   Sig: Take 40 mg by mouth At Bedtime   carvedilol (COREG) 3.125 MG tablet 4/30/2020 at Unknown time  No No   Sig: Take 3 tablets (9.375  mg) by mouth 2 times daily (with meals)   carvedilol (COREG) 3.125 MG tablet   Yes Yes   Sig: Take 2 tablets (6.25 mg) by mouth 2 times daily (with meals)   chlorhexidine (CHLORHEXIDINE) 0.12 % solution  Self Yes Yes   Sig: Rinse and gargle 15 ml by mouth twice a day as directed.   cinacalcet (SENSIPAR) 30 MG tablet 4/30/2020 at Unknown time  Yes Yes   Sig: Take 30 mg by mouth three times a week On dialysis days   cloNIDine (CATAPRES) 0.1 MG tablet   Yes No   Sig: Take 0.1 mg by mouth daily as needed (for high blood pressure) If sbp > 180   clopidogrel (PLAVIX) 75 MG tablet 4/30/2020 at AM  No Yes   Sig: Take 1 tablet (75 mg) by mouth daily   dapsone 100 MG tablet 4/30/2020 at HS Self Yes Yes   Sig: Take 100 mg by mouth At Bedtime    dolutegravir (TIVICAY) 50 MG tablet 4/30/2020 at HS Self Yes Yes   Sig: Take 50 mg by mouth At Bedtime   furosemide (LASIX) 40 MG tablet   No Yes   Sig: Take 1 tablet (40 mg) by mouth See Admin Instructions Take one tablet daily on non-dialysis days (Tuesday, Thursday, Saturday and Sunday)   gabapentin (NEURONTIN) 300 MG capsule 4/30/2020 at x2  Yes Yes   Sig: Take 300 mg by mouth 2 times daily    imiquimod (ALDARA) 5 % cream  Self Yes No   Sig: Apply topically as needed   insulin aspart (NOVOLOG FLEXPEN) 100 UNIT/ML pen 4/30/2020 at x2  Yes Yes   Sig: Inject Subcutaneous 3 times daily (with meals) Take 2 units for every 50 BG above 150   ipratropium - albuterol 0.5 mg/2.5 mg/3 mL (DUONEB) 0.5-2.5 (3) MG/3ML neb solution  Self No Yes   Sig: Take 1 vial (3 mLs) by nebulization every 6 hours as needed for shortness of breath / dyspnea or wheezing   isosorbide mononitrate (IMDUR) 30 MG 24 hr tablet 4/30/2020 at AM  No Yes   Sig: Take 1 tablet (30 mg) by mouth daily   ketoconazole (NIZORAL) 2 % cream  Self Yes Yes   Sig: Apply topically daily Between toes for fungal infection   melatonin 5 MG tablet 4/30/2020 at HS  Yes Yes   Sig: Take 5 mg by mouth nightly as needed    mirtazapine  "(REMERON) 15 MG tablet 4/30/2020 at HS Self Yes Yes   Sig: Take 15 mg by mouth At Bedtime   nitroglycerin (NITROSTAT) 0.4 MG SL tablet  Self No Yes   Sig: One tablet under the tongue every 5 minutes if needed for chest pain. May repeat every 5 minutes for a maximum of 3 doses in 15 minutes\"   ondansetron (ZOFRAN-ODT) 4 MG ODT tab   Yes Yes   Sig: Take 4 mg by mouth every 8 hours as needed for nausea   pantoprazole (PROTONIX) 40 MG EC tablet 4/30/2020 at AM Self Yes Yes   Sig: Take 40 mg by mouth daily   polyethylene glycol (MIRALAX/GLYCOLAX) packet   Yes Yes   Sig: Take 1 packet by mouth daily as needed for constipation   ritonavir (NORVIR) 100 MG capsule 4/30/2020 at HS Self Yes Yes   Sig: Take 1 capsule by mouth At Bedtime    sucroferric oxyhydroxide (VELPHORO) 500 MG CHEW chewable tablet 4/30/2020 at x2 Self Yes Yes   Sig: Take 500 mg by mouth 3 times daily (with meals)    umeclidinium (INCRUSE ELLIPTA) 62.5 MCG/INH inhaler 4/30/2020 at AM  Yes Yes   Sig: Inhale 1 puff into the lungs daily      Facility-Administered Medications: None     Allergies   Allergies   Allergen Reactions     Calcium Acetate Other (See Comments)     Other reaction(s): Other, see comments  Pain in chest and back  Pain in chest area (sensitive skin)      Contrast Dye Other (See Comments)     Contrast nephropathy     Diagnostic X-Ray Materials Other (See Comments)     PN: renal failure     Lisinopril      Sulfa Drugs        Social History   I have reviewed this patient's social history and updated it with pertinent information if needed. Donald Raza  reports that he quit smoking about 17 years ago. His smoking use included cigarettes. He has a 82.00 pack-year smoking history. He has never used smokeless tobacco. He reports that he does not drink alcohol or use drugs.    Family History   I have reviewed this patient's family history and updated it with pertinent information if needed.   Family History   Problem Relation Age of Onset     " Heart Disease Brother 40        CABG     Kidney Disease Sister      Hypertension Sister      Heart Disease Brother         Dilated aorta       Review of Systems   The 10 point Review of Systems is negative other than noted in the HPI or here.     Physical Exam   Temp: 95.8  F (35.4  C) Temp src: Oral BP: 108/51 Pulse: 76 Heart Rate: 69 Resp: 16 SpO2: 96 % O2 Device: None (Room air) Oxygen Delivery: 2 LPM  Vital Signs with Ranges  Temp:  [95.8  F (35.4  C)-98.2  F (36.8  C)] 95.8  F (35.4  C)  Pulse:  [61-76] 76  Heart Rate:  [61-93] 69  Resp:  [16-18] 16  BP: ()/(34-75) 108/51  SpO2:  [91 %-99 %] 96 %  195 lbs 8 oz     GENERAL:  Very pleasant gentleman in no acute distress.   VITAL SIGNS:  Current blood pressure 108/51, heart rate is in the 70s.  He has been normal sinus rhythm.   HEENT:  Examination is unremarkable.  Mucous membranes appear normal.   NECK:  No raised JVP, no thyromegaly.   CARDIAC:  Cardiac apex is not palpable.  JVP is not elevated.  He has a normal first and second heart sound.  There is a 2/6 ejection systolic murmur left sternal border.   CHEST:  Symmetrical expansion without the use of accessory muscles.  Breath sounds are normal.  Pacer site well-healed.   ABDOMEN:  Soft and nontender.  No hepatosplenomegaly.  The abdominal aorta is not palpable.   EXTREMITIES:  No significant peripheral edema.  Foot pulses are diminished.   CENTRAL NERVOUS SYSTEM:  Grossly intact.   PSYCHIATRIC:  Mood appears normal.      LABORATORY INVESTIGATIONS:  EKG from yesterday shows a sinus rhythm with a first-degree AV block.  There is a possible inferior infarct with nonspecific intraventricular conduction delay.  Sodium is 133, potassium is 3.6, creatinine is 5.06.  GFR estimate is 11.  Hemoglobin is 10.3, platelet count is 113, white cell count is 6.  Potassium on admission is 4.3.  Lactic acid is mildly elevated.  Troponin is 4.067.        Data   Results for orders placed or performed during the hospital  encounter of 20 (from the past 24 hour(s))   Nephrology IP Consult: Patient to be seen: Routine - within 24 hours; ESRD on dialysis; Consultant may enter orders: Yes; Requesting provider? Hospitalist (if different from attending physician)    Doyle Pool MD     2020 10:00 AM  See progress note   Glucose by meter   Result Value Ref Range    Glucose 148 (H) 70 - 99 mg/dL   Glucose by meter   Result Value Ref Range    Glucose 264 (H) 70 - 99 mg/dL   Glucose by meter   Result Value Ref Range    Glucose 173 (H) 70 - 99 mg/dL   Glucose by meter   Result Value Ref Range    Glucose 175 (H) 70 - 99 mg/dL   Basic metabolic panel   Result Value Ref Range    Sodium 133 133 - 144 mmol/L    Potassium 3.6 3.4 - 5.3 mmol/L    Chloride 96 94 - 109 mmol/L    Carbon Dioxide 29 20 - 32 mmol/L    Anion Gap 8 3 - 14 mmol/L    Glucose 186 (H) 70 - 99 mg/dL    Urea Nitrogen 41 (H) 7 - 30 mg/dL    Creatinine 5.06 (H) 0.66 - 1.25 mg/dL    GFR Estimate 11 (L) >60 mL/min/[1.73_m2]    GFR Estimate If Black 12 (L) >60 mL/min/[1.73_m2]    Calcium 9.2 8.5 - 10.1 mg/dL   CBC with platelets   Result Value Ref Range    WBC 6.0 4.0 - 11.0 10e9/L    RBC Count 3.75 (L) 4.4 - 5.9 10e12/L    Hemoglobin 10.3 (L) 13.3 - 17.7 g/dL    Hematocrit 35.0 (L) 40.0 - 53.0 %    MCV 93 78 - 100 fl    MCH 27.5 26.5 - 33.0 pg    MCHC 29.4 (L) 31.5 - 36.5 g/dL    RDW 18.0 (H) 10.0 - 15.0 %    Platelet Count 113 (L) 150 - 450 10e9/L   Glucose by meter   Result Value Ref Range    Glucose 141 (H) 70 - 99 mg/dL               SUSAN FRANKS MD, [facility]             D: 2020   T: 2020   MT: NTS      Name:     GREGORIO BRUSH   MRN:      [id number]        Account:       [id number]   :      1950           Consult Date:  2020      Document: [id number]

## 2020-05-02 NOTE — PLAN OF CARE
PRIMARY DIAGNOSIS: Hypotension  OUTPATIENT/OBSERVATION GOALS TO BE MET BEFORE DISCHARGE:  ADLs back to baseline: Yes    Activity and level of assistance: SBA for safety     Pain status: Pain free.    Return to near baseline physical activity: Yes    Patient is alert and oriented x4. VSS. Denies pain. Denies dizziness. Cards consult due to vtach overnight. Tele monitoring. SBA in room.      Discharge Planner Nurse   Safe discharge environment identified: Yes  Barriers to discharge: No       Entered by: Kaykay Howrad 05/02/2020 10:03 AM     Please review provider order for any additional goals.   Nurse to notify provider when observation goals have been met and patient is ready for discharge.

## 2020-05-02 NOTE — PROGRESS NOTES
CTS identifies pt as high risk due to high MUSHTAQ. Pt with complex medical history of ESRD on HD, HIV, CAD and AV stenosis currently admitted from dialysis center w/ hypotension. Pt is well known to care transitions due to frequent hospitalizations, pt has had 12 ER visits and 13 hospital admissions in the past 1 year. Per chart review, pt resides at home with his wife and brother, wife and brother are very supportive of him. He attends HD MWF at [facility].     Pt is already scheduled for CORE follow-up 5/18 and Cardiology appt 6/15.      No handoff needed at this time as no gap in care identified.      CM will continue to follow patient for any additional discharge needs.      Radha Mayfield RN BSN   Inpatient Care Coordination  [facility]   [phone]

## 2020-05-02 NOTE — PLAN OF CARE
PRIMARY DIAGNOSIS: Hypotension     OUTPATIENT/OBSERVATION GOALS TO BE MET BEFORE DISCHARGE  1. Orthostatic performed: No     2. Tolerating PO medications: Yes     3. Return to near baseline physical activity: Yes     4. Cleared for discharge by consultants (if involved): No         Discharge Planner Nurse   Safe discharge environment identified: Yes  Barriers to discharge: No       Entered by: Roscoe Carnes RN      Please review provider order for any additional goals.   Nurse to notify provider when observation goals have been met and patient is ready for discharge.        Vitals are Temp: 96.8  F (36  C) Temp src: Oral BP: 100/44 Pulse: 76 Heart Rate: 74 Resp: 16 SpO2: 91 %.  Patient is Alert and Oriented x4. They are SBA with Gait Belt and Walker.  Pt is a Diabetic diet.  They are denying pain.  Patient is Saline locked. BP improving, held all  BP meds.

## 2020-05-02 NOTE — PROVIDER NOTIFICATION
Queta MARIE due to 3 min run of V-TACH noted, tele tech called and writer ran to room. Became responsive with a sternal rub. Vitals stable. Alert and oriented, denied pain.     /75 (BP Location: Right arm)   Pulse 76   Temp 98.2  F (36.8  C) (Oral)   Resp 16   SpO2 91%

## 2020-05-04 NOTE — TELEPHONE ENCOUNTER
Called and left patient a voicemail asking him to send manual remote device transmission when he gets home from the hospital per Dr. Valentin's request. Per message, Pt had wide complex rhythm 5-1 or 5-2 and he would like to see if his device has anything logged. Provided device clinic phone number for patient to call if he has questions or needs assistance with sending manual transmission.

## 2020-05-05 ENCOUNTER — DOCUMENTATION ONLY (OUTPATIENT)
Dept: OTHER | Facility: CLINIC | Age: 70
End: 2020-05-05

## 2020-05-07 LAB
BACTERIA SPEC CULT: NO GROWTH
BACTERIA SPEC CULT: NO GROWTH
SPECIMEN SOURCE: NORMAL
SPECIMEN SOURCE: NORMAL

## 2021-01-17 NOTE — PROGRESS NOTES
[facility] Cardiology Progress Note             Assessment and Plan:   Assessment:   68 y.o. M. With known CAD S/P stenting of the LAD and last previous coronary angiography in 10/17.  FFR of the LAD was negative.  1.  Acute chest pain.  Patient continues to have significant rest chest pain with elevated troponin despite medical therapy including IV NTG.  His chronic anemia is contributing.  His BP is better.  No acute changes in his EKG.  I see no alternative to repeating coronary angiography at this time to assess his anatomy.  This has been fully discussed with the patient.  Procedure described and risks and benefits discussed.  Patient wishes to proceed.  2.  ESRD (end stage renal disease) on dialysis (H)  3.  Acute respiratory failure with hypoxia (H)   -Acute pulmonary edema (H).  Improved after dialysis yesterday.   -Respiratory failure (H).  Required BiPap last night.  Better this AM.  4.  Chronic anemia.  Transfused one unit of PRBCs yesterday but Hgb back to 7.5 today.        Plan:   1.  Coronary angiography, possible PCI today.  2.  Continue current medications.  3.  BP improved after dialysis and change of ARB to irbesartan.  4.  Keep Hgb>8.0 if possible.     Attestation:  Care coordination / counseling time: 20 minutes  Face-to-face time: 15 minutes  Total time: 35 minutes    Chago Loaiza MD, [facility]  March 9, 2018 9:28 AM      Interval History:   Notes recurrent chest pain around Midnight last night.  Gone now.  Increase shortness of breath requiring BiPAP.           Review of Systems:   A comprehensive review of systems was performed and found to be negative except as described in this note          Medications:       aspirin  162 mg Oral Once     heparin Loading Dose  2,400 Units Intravenous Once     abacavir  600 mg Oral QPM     amLODIPine (NORVASC) tablet 5 mg  5 mg Oral Daily     atorvastatin  40 mg Oral At Bedtime     clopidogrel (PLAVIX) tablet 75 mg  75 mg Oral QPM     darunavir  800  "mg Oral At Bedtime     dolutegravir  50 mg Oral At Bedtime     gabapentin  300 mg Oral QAM     gabapentin  600 mg Oral QPM     hydrALAZINE (APRESOLINE) tablet 25 mg  25 mg Oral BID     insulin glargine  50 Units Subcutaneous Daily     isosorbide mononitrate  120 mg Oral QPM     magnesium oxide (MAG-OX) tablet 400 mg  400 mg Oral At Bedtime     NEPRO   Oral BID     omega-3 acid ethyl esters  2 g Oral BID     omeprazole  40 mg Oral Daily     ritonavir  100 mg Oral At Bedtime     dapsone  100 mg Oral At Bedtime     sodium chloride (PF)  3 mL Intracatheter Q8H     insulin aspart  10 Units Subcutaneous TID w/meals     insulin aspart  1-7 Units Subcutaneous TID AC     insulin aspart  1-5 Units Subcutaneous At Bedtime     sodium chloride (PF)  3 mL Intracatheter Q8H     irbesartan  150 mg Oral At Bedtime     NEPHROCAPS  1 capsule Oral Daily     - MEDICATION INSTRUCTIONS -, hypromellose-dextran, - MEDICATION INSTRUCTIONS -, clonazePAM (klonoPIN) tablet 0.5 mg, ipratropium - albuterol 0.5 mg/2.5 mg/3 mL, glucose **OR** dextrose **OR** glucagon, naloxone, melatonin, HOLD MEDICATION, lidocaine (buffered or not buffered), lidocaine 4%, sodium chloride (PF), nitroGLYcerin, lidocaine (buffered or not buffered), lidocaine 4%, sodium chloride (PF), acetaminophen, senna-docusate **OR** senna-docusate, polyethylene glycol, ondansetron **OR** ondansetron, Continuing ACE inhibitor/ARB/ARNI from home medication list OR ACE inhibitor/ARB order already placed during this visit, - MEDICATION INSTRUCTIONS -             Physical Exam:   Blood pressure 145/80, temperature 96.5  F (35.8  C), temperature source Axillary, resp. rate 18, height 1.803 m (5' 11\"), weight 87.4 kg (192 lb 9.6 oz), SpO2 98 %.    Intake/Output Summary (Last 24 hours) at 03/09/18 0919  Last data filed at 03/09/18 0635   Gross per 24 hour   Intake             1258 ml   Output             2650 ml   Net            -1392 ml     Rhythm:  NSR     Constitutional:   awake, " alert, cooperative, no apparent distress, and appears stated age     Lungs:   tachypneic, wearing BiPAP and few rales at right base.     Cardiovascular:   Regular rate and rhythm, normal S1 and S2 without S3 or S4 gallop.  There is a 2/6 mid to late blowing systolic murmur of mitral regurgitation, left lower sternal border to the apex.  No other murmurs heard.  No heave, rub or thrill.  Pulses are intact to the lower extremities.  No tenderness to palpation across the precordium.     Abdomen:   Obese, soft, nontender without organomegaly.  Bowel sounds are present.  No bruits.     Neuropsychiatric:   Level of consciousness: alert / normal     Skin:   no bruising or bleeding and no redness, warmth, or swelling            Data:   No results for input(s): PH, PHARTERIAL, PO2, [id number], SAT, PCO2, HCO3, BASEEXCESS, SARANYA, BEB in the last 168 hours.    Invalid input(s): [id number]    Recent Labs  Lab 03/09/18  0709 03/09/18  0050 03/08/18  0542   WBC 6.1 6.6 4.7   HGB 7.5* 9.2* 7.1*   HCT 25.1* 29.3* 22.9*   MCV 97 95 94    239 188       Recent Labs  Lab 03/09/18  0709 03/09/18  0023 03/08/18  0542    137 138   POTASSIUM 4.3 4.2 3.8   CHLORIDE 101 100 102   CO2 30 29 25   ANIONGAP 5 8 11   * 131* 161*   BUN 44* 36* 61*   CR 5.56* 4.83* 8.08*   GFRESTIMATED 10* 12* 7*   GFRESTBLACK 12* 15* 8*   PRABHA 8.8 8.8 9.2       Recent Labs  Lab 03/09/18  0709 03/09/18  0023 03/08/18  1134  03/08/18  0542   TROPONIN  --   --   --   --  0.02   TROPI 0.074* 0.058* 0.052*  < > 0.043   < > = values in this interval not displayed.          EKG results:   I have reviewed this patient's EKG with the following interpretation:        Normal sinus rhythm, normal axis, no acute ischemic ST segment or T wave changes      Other cardiac studies:   Echo pending    Radiology:            Chago Loaiza MD, [facility] 3/9/2018  9:19 AM          - Likely 2/2 to vaginal bleeding   - GYN recs appreciated   - Anemia w/u ordered for AM  - monitor CBC, and transfuse PRN   - keep active T&S - Transvaginal US c/w adenomyosis  - Seen by Gyn appreciate recs  - Out pt f/u Dr. Moreland to discuss definitive treatment   - monitor CBC, transfuse PRN  - Pt is receiving 2 units of PRBC , please do post transfusion CBC 11 AM - Transvaginal US c/w adenomyosis  - Seen by Gyn appreciate recs  - Out pt f/u Dr. Moreland to discuss definitive treatment   - monitor CBC, transfuse PRN

## 2021-04-02 NOTE — PROVIDER NOTIFICATION
"\"This is to notify you that pt's troponin is elevated at this time 0.080. Thank you.\"   Call back number provided.  " Labs reviewed: vitamin d low at 24.6 start ergocalciferol [id number] weekly x 12 weeks

## 2021-04-21 NOTE — PLAN OF CARE
Neuro:intact  CV/Rhythm:sr bbb, new PPM  Resp/02:ra - 2L with pain  GI/Diet:HD diet  :HD tomorrow  Skin/Incisions/Sites:intact  Pulses/CMS:intact  Edema:trace BLE  Activity/Falls Risk:fall risk, walker, assistx1  Lines/Drains/IVs:piv, L UE fistula  Labs/BGM:Cr 7, Hgb 7.1 - transfuse in HD 5/4  Test/Procedures:HD 5/4, PPM 5/2  VS/Pain:HTN, PPM site pain - oxycodone given  DC Plan:tomorrow post HD  Other:-     no

## 2021-06-04 VITALS — BODY MASS INDEX: 29.04 KG/M2 | HEIGHT: 68 IN | WEIGHT: 191.6 LBS

## 2021-06-04 NOTE — PROGRESS NOTES
Order received for Phase II Cardiac Rehab. Faxed to [facility] at patient's request.

## 2021-06-20 NOTE — LETTER
Letter by Lc Sher MD at      Author: Lc Sher MD Service: -- Author Type: --    Filed:  Encounter Date: 5/4/2020 Status: (Other)         Donald Raza  [address] [facility] [address]      May 4, 2020      Dear Donald,    This letter is to remind you that you will be due for your follow up appointment with Dr. Lc Sher in May, 2020 . To help ensure you are in the best health possible, a regular follow-up with your cardiologist is essential.     Please call our Patient Scheduling Line at [phone] to schedule your appointment at your earliest convenience.  If you have recently scheduled an appointment, please disregard this letter.    We look forward to seeing you again. As always, we are available at the number  above for any questions or concerns you may have.      Sincerely,     The Physicians and Staff of [facility]

## 2021-07-14 PROBLEM — I25.10 CORONARY ARTERY DISEASE: Chronic | Status: RESOLVED | Noted: 2019-01-01 | Resolved: 2020-01-01

## 2021-07-14 PROBLEM — G45.9 TIA (TRANSIENT ISCHEMIC ATTACK): Status: RESOLVED | Noted: 2018-09-01 | Resolved: 2019-01-01

## 2021-08-03 PROBLEM — A41.9 SEPSIS (H): Status: RESOLVED | Noted: 2018-12-06 | Resolved: 2019-01-01

## 2022-01-08 NOTE — PROGRESS NOTES
Cardiology Hospitalist Potassium   Date Value Ref Range Status   04/15/2017 5.1 3.4 - 5.3 mmol/L Final     Lab Results   Component Value Date    HGB 7.1 04/15/2017     Weight: 96.2 kg (212 lb 1.3 oz)  POST WT: 92.2kg   DIALYSIS PROCEDURE NOTE    Patient dialyzed for 3.5 hrs. on a  K2 bath with a net fluid removal of  4L.    A BFR of 450 ml/min was obtained via a left AV fistula using 15 gauge needles.      The patient was seen by Dr. Hamilton during the treatment.    Total heparin received during the treatment: 0 units.   Sites held x 18 min then  pressure drsgs applied.    Meds  Given: 11,000 units epo, 1.5mcg Hectorol   Complications: none.    Procedure and ESRD teaching done and questions answered. See flowsheet in EPIC for further details and post assessment.  Machine water alarm in place and functioning.  Consent for dialysis signed prior to start of treatment.  Pt returned via bed.   Vascular Access: Aseptic prep done for both on/off.  Report received from: Radha Grossman RN  Report given to: Radha Grossman RN  HEPATITIS B SURFACE ANTIGEN: negative DATE: 1/18/17   HEPATITIS B SURFACE ANTIBODY: immune DATE: 1/18/17  Chlorine/Chloramine water system checked every 4 hours.  Outpatient Dialysis at [facility] Tu.Th.Sat.    Catina Blank RN  [facility] Acute Dialysis   Cardiology Cardiology Cardiology Critical Care Hospitalist Cardiology Hospitalist Hospitalist Critical Care Hospitalist Hospitalist CT Surgery

## 2022-07-06 NOTE — DISCHARGE INSTRUCTIONS
Follow up appt:  You have been scheduled an appointment at [facility] with Dr Owen on Mon 2/4 at 3:30   Carac Counseling:  I discussed with the patient the risks of Carac including but not limited to erythema, scaling, itching, weeping, crusting, and pain.

## 2022-07-07 NOTE — PROGRESS NOTES
Clinic Care Coordination Contact  [place] [facility] Care Coordination Outreach    Patient was enrolled in [facility] upon Discharged from: Hospital    Program Type: Diabetes    Program Length: 30 days post discharge    Clinical Data:  Outreach Type: Variance follow up call          Action: Actions: Left Voicemail                      Plan: RN CC will continue to monitor patients responses. Will plan to outreach as needed and with any new variances or concerns.          hard copy, drawn during this pregnancy

## 2022-07-21 NOTE — PROGRESS NOTES
Patient alert and oriented, forgetful.  Up with assist of 1 gait belt and walker.  Lung sounds dim/clear.  Bowel sounds active and audible.  SOB reported, dizziness when up (provider updated throughout the day, see previous notes).  2L O2 92% via NC.  Tele: A-paced with peaked T-waves.  Renal diet, blood sugars 113, 154 and 109.  Will continue to monitor.      no concerns

## 2022-08-14 NOTE — ED TRIAGE NOTES
Pt complains of SOB for last 2 hours.  Denies fever or cough   Pt is a 33year old female admitted to [address] [place] [address]. Patient presents with:  Scheduled Induction: Post Dates     Pt is  40w4d intra-uterine pregnancy. History obtained, consents signed. Oriented to room, staff, and plan of care.

## 2022-09-26 NOTE — PROGRESS NOTES
Service Date: 04/11/2018      HISTORY OF PRESENT ILLNESS:  This 68-year-old male presents to [facility] today for a followup visit.  He is a patient of Dr. Diaz seen in our clinic for coronary artery disease, end-stage renal disease, hypertension, insulin-dependent diabetes, mixed hyperlipidemia and HIV positive.      Donald has a complex cardiovascular history.  Originally he suffered a myocardial infarction and underwent stenting to his LAD in 2015.  Since then he has undergone stenting to an ostial diagonal vessel, repeat angioplasty to an LAD, and multiple stenting to his diagonal vessel due to in-stent restenosis, last being in 08/2016.  Since then he has had issues with chest pain and has undergone repeat coronary angiography, in which no PCIs have been done.   Donald has been admitted to the hospital with resting chest pain on 03/08/2018.  He was noted to have a CHF exacerbation, quite hypertensive and significant anemia with hemoglobin of 7.1.  He has known end-stage renal disease and did undergo dialysis.  His troponin level peaked at 0.07.  There were no ST changes.  He underwent repeat coronary angiography showing no significant change, but elevated left ventricular end-diastolic pressure and an ejection fraction of 30%.  There was no PCI performed.  His symptoms did improve with hemodialysis and they lowered his dry weight and subsequently he has undergone more frequent hemodialysis in the outpatient setting.  His anemia was corrected with 4 units of packed red blood cells.  His antihypertensive agents were intensified by increasing his hydralazine and changing his losartan to irbesartan.  An echocardiogram showed preserved LV function without any regional wall motion abnormalities, normal right ventricular function and mild mitral regurgitation.  He returns today for reassessment.      Donald tells me he has been fairly stable over the last month.  He has had 1 episode of  "chest pain that occurred during dialysis which was quickly relieved with nitroglycerin.  Other than that, he has been free from any angina symptoms.  He has not been particularly short of breath.  His main complaint is a lot of fatigue that he experiences after his hemodialysis.  He does feel like they are \"pulling off too much fluid.\"  He denies any significant shortness of breath or peripheral edema.  He has not had any sensations of palpitations, lightheadedness or near-syncope.  He does monitor his blood pressure at home and tells me his systolic blood pressure is ranging between 135 mmHg to 90 mmHg.      PHYSICAL EXAMINATION:  His blood pressure today is 90/50, heart rate is 61 beats per minute and is regular.  His lungs are clear.  There is no peripheral edema.  Weight now at 189 pounds which is down greater than 10 pounds since the last time he was here.      IMPRESSION AND PLAN:   1.  Coronary artery disease.  History of multiple myocardial infarctions and stenting to the mid-LAD and multiple stenting to diagonal vessel due to in-stent restenosis.  Recent hospitalizations for chest pain were related to significant anemia and heart failure exacerbations.  Since then, he has received multiple blood transfusions and increased frequency of dialysis with a lower dry weight.  He has now been stable for the last month.  We will continue current medical regimen.  He has not had significant chest pain.   2.  End-stage renal disease on hemodialysis.   3.  Hypertension.  He is on multiple agents to control his blood pressure.  He does follow closely with Nephrology.   4.  Insulin-dependent diabetes.   5.  Treated mixed hyperlipidemia.      Thank you for allowing me to participate in this patient's care.  He will follow up in about 6 months with Dr. Diaz.         DEDRICK STEVENS, CNP             D: 04/11/2018   T: 04/11/2018   MT: JOHN      Name:     GREGORIO BRUSH   MRN:      [id number]        Account:      " [id number]   :      1950           Service Date: 2018      Document: [id number]       Detail Level: Zone Detail Level: Simple

## 2023-01-11 NOTE — PROGRESS NOTES
Inpatient Dialysis Progress Note        Assessment and Plan:     67 y.o gentleman with ESRD and HIV, admitted for HCAP.      # ESRD                         -3.5hrs, LAVF, eDW 85.5 kg, 61274 units Epogen, 2 mcg of Hectorol     # HCAP. He is on Rocephin and and Zithromax. Improving.     # Anemia. Hgb was 6.4 on admission.                          -78805 units of Epogen                         -he got one unit of PRBC on HD     # HTN. Blood pressure is controlled on current regimen     # CKD-MBD. Hectorol     # HIV on ARV           Interval History:     She feels a little better. His cough is a little better. He is getting HD treatment.         Dialysis Parameters:     Vitals:    02/01/18 0545   Weight: 87.8 kg (193 lb 9.6 oz)     I/O last 3 completed shifts:  In: 950 [P.O.:950]  Out: 50 [Urine:50]  Temp:  [97.6  F (36.4  C)-98.2  F (36.8  C)] 97.6  F (36.4  C)  Heart Rate:  [71-91] 84  Resp:  [16-18] 16  BP: (133-172)/(64-85) 160/80  SpO2:  [93 %-96 %] 93 %    Current Weight: 87.8  Dry Weight: 85.5  Dialysis Temp: 36.5  C  Access Device: AVF  Access Site: left arm  Dialyzer: Revaclear  Dialysis Bath: 3K  Sodium Profile: none  UF Goal: 3 liters  Blood Flow Rate (mL/min): 400  Total Treatment Time (hrs): 3.5  Heparin: Low dose as required    Review of Systems:        Medications:     EPO dose: 04567 units  Hectorol: 2 mcg       Physical Exam:     Vitals were reviewed  Patient Vitals for the past 24 hrs:   BP Temp Temp src Heart Rate Resp SpO2   02/03/18 1115 160/80 - - 84 - -   02/03/18 1100 151/75 - - 85 - -   02/03/18 1045 147/73 - - 86 - -   02/03/18 1030 155/78 - - 89 - -   02/03/18 1015 152/80 - - 90 - -   02/03/18 1000 159/81 - - 86 - -   02/03/18 0945 145/75 - - 84 - -   02/03/18 0930 155/81 - - 87 - -   02/03/18 0915 162/85 - - 87 - -   02/03/18 0900 172/82 - - 91 - -   02/03/18 0845 159/82 - - 85 - -   02/03/18 0830 139/69 - - 80 - -   02/03/18 0815 145/69 - - 73 - -   02/03/18 0806 133/68 - - 71 - -    18 0800 148/77 97.6  F (36.4  C) Oral 81 16 93 %   18 0730 - - - - - 96 %   18 0230 - - - -  -   18 0133 - - - -  -   18 0100 140/72 97.8  F (36.6  C) Oral 84 16 94 %   18 2245 - - - -  -   18 2150 - - - -  -   18 2136 153/82 98  F (36.7  C) Oral 89 16 96 %   18 1605 133/64 98.2  F (36.8  C) Oral 75 18 94 %   18 1550 - - - - - 94 %       Temperatures:  Current - Temp: 97.6  F (36.4  C); Max - Temp  Av.9  F (36.6  C)  Min: 97.6  F (36.4  C)  Max: 98.2  F (36.8  C)  Respiration range: Resp  Av.3  Min: 16  Max: 18  Pulse range: No Data Recorded  Blood pressure range: Systolic (24hrs), Av , Min:133 , Max:172   ; Diastolic (24hrs), Av, Min:64, Max:85    Pulse oximetry range: SpO2  Av.5 %  Min: 93 %  Max: 96 %  I/O last 3 completed shifts:  In: 950 [P.O.:950]  Out: 50 [Urine:50]    Intake/Output Summary (Last 24 hours) at 18 1124  Last data filed at 18 0600   Gross per 24 hour   Intake              950 ml   Output               50 ml   Net              900 ml     GENERAL: pleasant, alert, NAD  HEENT:  Normocephalic. No gross abnormalities.  Pupils equal.  MMM.   CV: RRR, +murmur, no clicks, gallops, or rubs, no edema  RESP: Poor airflow. No wheezes.  GI: Abdomen soft, NT  MUSCULOSKELETAL: extremities nl - no gross deformities noted  SKIN: no suspicious lesions or rashes, dry to touch  NEURO:  Strength normal and symmetric. A/o x3  PSYCH: mood good, affect appropriate    Data:     Recent Labs   Lab Test  18   0646  18   0635  18   0250   NA   --   136  137   POTASSIUM   --   4.4  4.4   CHLORIDE   --   99  105   CO2   --   29  23   ANIONGAP   --   8  9   GLC  293*  238*  225*   BUN   --   46*  73*   CR   --   6.26*  8.85*   PRABHA   --   8.5  8.7       Hemoglobin   Date Value Ref Range Status   2018 7.1 (L) 13.3 - 17.7 g/dL Final              Doyle Marcial MD               - history of hypothyroidism, not currently on medication  - TSH 1.44, free T4 1.1

## 2024-05-10 NOTE — PROGRESS NOTES
Brief Hospitalist Cross cover note    I was contacted by nursing staff regarding: Rapid response was called due to patient's altered mental status and weakness.  Patient was seen by me immediately.  He is somnolent but able to wake up and able to move all extremities.  Patient denies chest pain or shortness of breath.  Events from this evening noted for earlier dialysis.  Patient was admitted for chest pain or shortness of breath pulmonary edema requiring dialysis.  Troponin minimally elevated but trended down.    On exam patient is somnolent but able to communicate.  He is alert and oriented x3.  No focal symptoms noted.  Twelve-lead EKG obtained at bedside showed normal sinus rhythm but there is apparent EKG change compared to earlier EKG at 3:35 PM.  There is ST segment elevation in leads III which was not present earlier EKG.  T wave inversion in lead I is prominent as well.  There is no consecutive ST segment elevation.  Lung sounds are diminished.  He requires 2 L of oxygen.  No peripheral edema.     BP (!) (P) 172/77 (BP Location: Right arm)   Pulse 59   Temp 97.6  F (36.4  C) (Oral)   Resp 18   Wt 90.9 kg (200 lb 8 oz)   SpO2 97%   BMI 27.96 kg/m       Admitted for: Pulmonary edema, chest pain    Other medical problems: See H&P from yesterday    Significant findings/Current problem:    Altered mental status:  acute encephalopathy which is likely toxic metabolic.  No lateralizing signs.  No hypoglycemia    Dynamic EKG change concern for acute coronary syndrome.    End-stage renal disease on hemodialysis    Recommendation:    Start patient on heparin, serial EKG checks, cardiology consultation    Chest x-ray, troponin, basic metabolic panel, electrolytes,    Echocardiogram    Close monitoring patient    Resume home medications including aspirin, beta-blocker and blood pressure meds      Critical Care time: was 35 minutes for this patient excluding procedures.    Addendum  I spoke with Dr. Flores of  cardiology service who is reviewing the EKG and getting back to me.  Given patient's complex medical problems, patient needs midline placement for IV access.     done

## 2024-06-07 NOTE — ED TRIAGE NOTES
Pt reports diarrhea and cough,symptoms started yesterday.  Pt dialyzed this morning and reports he's been feeling very week.    
No

## 2024-07-10 NOTE — PROGRESS NOTES
CALLED THE PT AND INFORMED HER THE ORDER HAS BEEN FAXED TO [facility] IN [place], THE OFFICE OF [facility], THE PT SAID THAT WAS GREAT. SUDHIR   Renal Medicine       Plan am dialysis  Dialysis orders entered          Recent Labs  Lab 04/04/17  0554      POTASSIUM 4.4   CHLORIDE 106   CO2 23   ANIONGAP 12   *   BUN 71*   CR 7.55*   GFRESTIMATED 7*   GFRESTBLACK 9*   PRABHA 8.9           JAMAL Fuller    [facility]  [phone]

## 2024-10-18 NOTE — PLAN OF CARE
History     Chief Complaint   Patient presents with    Fall     HPI  Demi Narayan is a 83 year old individual with history of GERD, thoracolumbar scoliosis, chronic heart failure, osteopenia, is sent in by PCP for evaluation after a fall.  Patient states he was chasing her dog yesterday and tripped and fell hitting her face on the cement.  There is no loss of consciousness.  Patient was able to get up and go around her daily business.  Contacted her PCP today to be evaluated but was sent to the ER.  Patient denies headache or visual disturbances.  No neck pain or back pain reported.  Denies any shortness of breath.  No extremity injury reported.  No pelvic or abdominal pain reported.  No lower extremity injury reported.  States that she did break one of her teeth and had problem with alignment with the top teeth and seeing dentist already.    Allergies:  No Known Allergies    Problem List:    Patient Active Problem List    Diagnosis Date Noted    Chronic heart failure with preserved ejection fraction (H) 02/27/2024     Priority: Medium    Osteopenia of multiple sites 12/01/2022     Priority: Medium    Bilateral carotid artery stenosis 10/12/2022     Priority: Medium     Formatting of this note might be different from the original.  Added automatically from request for surgery [id number]      Urinary urgency 08/26/2022     Priority: Medium    SVT (supraventricular tachycardia) (H) 08/26/2022     Priority: Medium    Stenosis of left carotid artery - s/p left endarterectomy. US due 12/2026 02/22/2022     Priority: Medium    Lyme disease 09/11/2017     Priority: Medium    Chronic, continuous use of opioids 07/03/2017     Priority: Medium     Patient is followed by Virgil Mackay MD for ongoing prescription of pain medication.  All refills should only be approved by this provider, or covering partner.    Medication(s): Ultram 50mg.   Maximum quantity per month: #120  Clinic visit frequency required: Q 3 months  VSS on RA, A/OX4, assist of 1 w/ GB and walker, pt c/o FRANCO and SOB at rest, LS dim but clear, no c/o CP or pain in general, pt had HD today, vomited X1 in dialysis, no N or V since, pt had black stool when he came back from dialysis , MD notified, Demand A-Paced on tele, trop-0.126, MD notified, trop at 2000 was 0.101, MD notified, no timeline for discharge at this time.      2156- RRT was called due to pt having AMS and weakness, pt arousing only temporarily to sternal rub.     2200- MD lafleur arrived in room to assess pt.      Frequent vitals were preformed throughout RRT, EKG, CBC, BMP, Trop, and CXR ordered, trop-0.096, K-4.2, Na-135, creatinine-6.08, glucose-156, hem-8.1, MD notified.      2245- Pt is more awake now, A/OX4, able to stay awake now, neuros intact, will continue to monitor.         Controlled substance agreement:  Encounter-Level CSA - 06/30/2017:              Controlled Substance Agreement - Scan on 7/5/2017  4:11 PM : CONTROLLED SUBSTANCE AGREEMENT (below)                Pain Clinic evaluation in the past: No    DIRE Total Score(s):  No flowsheet data found.    Last [facility] website verification:  Done on 7.31.18   [email or web address]        Scoliosis, darciracolumbar 02/05/2017     Priority: Medium    ACP (advance care planning) 08/05/2016     Priority: Medium     Advance Care Planning 8/5/2016: ACP Review of Chart / Resources Provided:  Reviewed chart for advance care plan.  Demi PHAM Sylvain has no plan or code status on file. Discussed available resources and provided with information. Confirmed code status reflects current choices pending further ACP discussions.  Confirmed/documented legally designated decision makers.  Added by Ava Padgett            Diaphragmatic hernia 04/25/2016     Priority: Medium    Post-menopause 11/09/2015     Priority: Medium    Osteoarthritis, multiple joints      Priority: Medium    GERD (gastroesophageal reflux) 02/10/2012     Priority: Medium    Fibrocystic breast disease (FCBD) 07/12/2011     Priority: Medium    Low back pain, chronic 07/12/2011     Priority: Medium    Dyslipidemia 06/20/2006     Priority: Medium    H/O diverticulitis, S/P bowel resection 02/06/2002     Priority: Medium                 Past Medical History:    Past Medical History:   Diagnosis Date    Chronic/recurrent back pain 07/12/2011    Diverticulitis     Diverticulitis, recurrent 02/06/2002    Dyslipidemia 06/20/2006    Fibrocystic breast disease 07/12/2011    Gastro-oesophageal reflux disease     GERD 02/10/2012    Hiatal hernia 02/10/2012    Osteoarthritis        Past Surgical History:    Past Surgical History:   Procedure Laterality Date    ------------OTHER-------------      colonoscopy with biopsy - diverticulitis, hemorrhoids    ------------OTHER-------------   04/19/1994    sigmoidoscopy - abdominal pain, diverticula    ABDOMEN SURGERY      colon removed 2nd to diverticulitis    APPENDECTOMY      ARTHROSCOPY SHOULDER  11/20/2013    Procedure: ARTHROSCOPY SHOULDER;  Right Shoulder Arthroscopy Sub-Acromial Decompression Rotator Cuff Repair;  Surgeon: Lenny Trammell MD;  Location: [facility]    [facility]    Divert[facility]itis      bowel resection    EGD with biopsy  01/01/2011    ENDARTERECTOMY CAROTID Left 10/17/2022    [facility]. Dr. Springer    ENDOSCOPY UPPER WITH PANCREATIC STIMULATION      ENDOSCOPY UPPER, COLONOSCOPY, COMBINED  07/18/2014    Procedure: COMBINED ENDOSCOPY UPPER, COLONOSCOPY;  Surgeon: Israel Feliciano MD;  Location: [facility] OR    ENDOSCOPY UPPER, COLONOSCOPY, COMBINED N/A 10/6/2023    Procedure: Upper Endoscopy with biopsies and Colonoscopy with biopsies;  Surgeon: Carlos Fraser MD;  Location: [facility] OR    ENT SURGERY      tonsilectomy    ESOPHAGOSCOPY, GASTROSCOPY, DUODENOSCOPY (EGD), COMBINED N/A 09/27/2017    Procedure: COMBINED ESOPHAGOSCOPY, GASTROSCOPY, DUODENOSCOPY (EGD);  UPPER ENDOSCOPY WITH BIOPSY AND POLYPECTOMY;  Surgeon: Gallito Alvarado DO;  Location: [facility] OR    GYN SURGERY      hysterectomy with bladder and rectal repair    IR CONSULTATION FOR IR EXAM  03/11/2019    ORTHOPEDIC SURGERY  11/20/2013    right rotator cuff surgery    TVH with ovarian preservation         Family History:    Family History   Problem Relation Age of Onset    Cerebrovascular Disease Father         CVA    Heart Disease Father         heart disease    Hypertension Father     Myocardial Infarction Father         myocardial infarction    Cerebrovascular Disease Mother         CVA    Diabetes Mother     Heart Disease Mother         heart disease    Hypertension Mother     Heart Disease Brother         heart disease    Hypertension Brother        Social History:  Marital Status:   [2]  Social History     Tobacco Use     Smoking status: Former     Current packs/day: 0.00     Average packs/day: 0.5 packs/day for 5.3 years (2.7 ttl pk-yrs)     Types: Cigarettes     Start date: 1963     Quit date: 1968     Years since quittin.4    Smokeless tobacco: Never   Substance Use Topics    Alcohol use: Yes     Alcohol/week: 0.8 standard drinks of alcohol     Types: 1 Glasses of wine per week     Comment: daily with dinner    Drug use: No        Medications:    aspirin 81 MG EC tablet  atorvastatin (LIPITOR) 20 MG tablet  Calcium Carbonate-Vitamin D (CALCIUM + D PO)  estradiol (ESTRACE) 0.5 MG tablet  GLUCOSAMINE SULFATE PO  latanoprost (XALATAN) 0.005 % ophthalmic solution  Multiple Vitamins-Minerals (MULTIVITAL PO)  Omega-3 Fatty Acids (OMEGA-3 FISH OIL PO)  pantoprazole (PROTONIX) 40 MG EC tablet  traMADol (ULTRAM) 50 MG tablet  Wheat Dextrin (BENEFIBER) POWD          Review of Systems   Constitutional: Negative.    HENT:  Positive for dental problem (Upper teeth injuries).    Eyes: Negative.    Respiratory: Negative.     Cardiovascular: Negative.    Gastrointestinal: Negative.    Endocrine: Negative.    Genitourinary: Negative.    Musculoskeletal: Negative.    Skin:  Positive for wound (Abrasions to the face and bruised left eye).   Allergic/Immunologic: Negative.    Neurological: Negative.    Hematological: Negative.    Psychiatric/Behavioral: Negative.         Physical Exam   BP: 162/83  Pulse: 87  Temp: 98.5  F (36.9  C)  Resp: 16  SpO2: 97 %      GENERAL APPEARANCE:  The patient is a 83 year old well-developed, well-nourished individual that appears as stated age.  HEENT:  Normocephalic.  No soft spots, indentations, tenderness noted upon palpation of the scalp or face.  No crepitus or deformities noted to face or scalp.  Pupils are equal, round, and reactive to light.  Oropharynx is clear.  No buccal or tongue lacerations present.  Chip noted to tooth #10.  No other avulsed teeth or loose teeth present.  Voice is clear and  without muffling.   No otorrhea or rhinorrhea present.  Negative kwong sign.  Negative for hemotympanum bilaterally.  NECK:  Supple.  Trachea is midline.  No carotid bruits present on auscultation.  CHEST:  Symmetric.  Non-tender to palpation.  No crepitus or deformity.  LUNGS:  Breathing is easy.  Breath sounds are equal and clear bilaterally.  No wheezes, rhonchi, or rales.  HEART:  Regular rate and rhythm with normal S1 and S2.  No murmurs, gallops, or rubs.  ABDOMEN:  Soft, flat, and benign.  No mass, tenderness, guarding, or rebound.  No organomegaly or hernia.  Bowel sounds are present.  No CVA tenderness or flank mass.  No abdominal bruits or thrills present upon auscultation/palpation.  Negative [place] sign.  Negative [place] Zhong sign.  MUSCULOSKELETAL:  Normal gait and station.  No thoracic/lumbar spinal tenderness, step-offs, deformities noted to palpation.  Mid spinal cervical tenderness to palpation.  Does have full range of motion of the cervical spine without issue.  No step-offs or deformities.  No paraspinal tenderness noted to palpation.  Pelvis stable upon palpation.  No crepitus, deformities, tenderness noted to palpation to the upper or lower extremities to palpation.  Range of motion intact to all joints.  Strength equal bilaterally.  MENTAL STATUS:   The patient was alert and oriented to person, place, time and purpose. Registration and recall intact. No difficulty with concentration.  Spontaneous eye opening.  Follows commands.  GCS 15.   CRANIAL NERVES:  PERRL. EOMI; no nystagmus.  Full visual fields.  Trapezius and sternocleidomastoid are full strength. Tongue was midline and protrudes midline. Uvula was midline and raises midline. Facial sensation was intact to pain and light touch at all distributions. No speech disturbance. Hearing intact to conversation and whisper.  No facial asymmetry.  SENSORY:  Sensation intact.  PSYCHIATRIC:  The patient is awake, alert, and oriented x4.  Recent  and remote memory is intact.  Appropriate mood and affect.  Calm and cooperative with history and physical exam.  SKIN:  Warm, dry, and well perfused.  Good turgor.  Multiple abrasions of the face that are starting to scab over.  No obvious foreign body.  No drainage or toxic striations.  No erythema.  Also has slight bruising to left upper eyelid/orbit.  Abrasions to the left hand present.  No obvious foreign body or toxic striations/active bleeding.  TDAP STATUS: Last Tdap given 02/02/2022.         ED Course     ED Course as of 10/18/24 1707   Fri Oct 18, 2024   1447 Trauma evaluation conducted.   1447 No trauma team activation required.  CT of head, facial bones, and cervical spine ordered.   1701 No acute abnormalities on CT of head, cervical spine, facial bones, or x-ray of the left hand.   1704 Patient will be discharged home to do standard wound care as discussed in AVS.  Acetaminophen/ibuprofen for pain control.  Return precautions given.               Results for orders placed or performed during the hospital encounter of 10/18/24 (from the past 24 hour(s))   CT Facial Bones without Contrast    Narrative    CT FACIAL BONES WITHOUT CONTRAST, 10/18/2024 4:16 PM    History: Female, age 83 years; Fall    Comparison: No relevant prior imaging.    TECHNIQUE: CT was performed of the facial bones. Axial, sagittal and  coronal images were reviewed.  This facility minimizes radiation dose by adjusting the mA and/or kV  according to each patient size.  This CT scan was performed using one or more the following dose  reduction techniques:  -Automated exposure control,  -Adjustment of the mA and/or kV according to patient's size, and/or,  -Use of iterative reconstruction technique.      FINDINGS: The mandible is intact. Temporomandibular joints are  congruent. Bones of the face demonstrate no distinct evidence of an  acute fracture. There is polypoid mucosal thickening involving the  floor the right maxillary sinus.  Soft tissues of the face demonstrate  no acute abnormality.        Impression    IMPRESSION:   No evidence of acute or subacute bony abnormality.     Chronic appearing polypoid mucosal thickening involving the right  maxillary sinus.    KARISSA WILEY MD         SYSTEM ID:  [id number]   CT Head w/o Contrast    Narrative    PROCEDURE: CT HEAD W/O CONTRAST   10/18/2024 4:18 PM    HISTORY:Female, age,  83 years, pain, , Fall    COMPARISON: CT scan of the brain 7/5/2016    TECHNIQUE: CT of the brain without contrast. Axial; sagittal and  coronal reconstructed images were reviewed.  This facility minimizes radiation dose by adjusting the mA and/or kV  according to each patient size.  This CT scan was performed using one or more the following dose  reduction techniques:  -Automated exposure control,  -Adjustment of the mA and/or kV according to patient's size, and/or,  -Use of iterative reconstruction technique.      FINDINGS: Ventricles and sulci demonstrate changes consistent with  atrophy. Gray and white matter demonstrate scattered areas of  decreased density.    There is no evidence of mass, mass effect or midline shift. No  evidence of acute hemorrhage. No acute fracture.         Impression    IMPRESSION:   No acute intracranial abnormality.  No acute fracture.     Nonspecific white matter changes suggesting small vessel disease.        KARISSA WILEY MD         SYSTEM ID:  [id number]   CT Cervical Spine w/o Contrast    Narrative    CT CERVICAL SPINE W/O CONTRAST, 10/18/2024 4:19 PM    History: Female, age 83 years; Fall    Comparison: No relevant prior imaging.    TECHNIQUE: CT was performed of the cervical spine. Axial, sagittal and  coronal images were reviewed.  This facility minimizes radiation dose by adjusting the mA and/or kV  according to each patient size.  This CT scan was performed using one or more the following dose  reduction techniques:  -Automated exposure control,  -Adjustment of the mA and/or kV  according to patient's size, and/or,  -Use of iterative reconstruction technique.      FINDINGS: The cervical spine demonstrates normal curvature. There is  no evidence of an acute or subacute fracture. Vertebral bodies and  posterior elements are well aligned. Severe disc space and endplate  degeneration is seen within the mid cervical spine. Visualized  portions of the lungs demonstrate chronic changes. Soft tissues of the  neck demonstrate no acute abnormality.        Impression    IMPRESSION:   No evidence of acute or subacute bony abnormality.     Moderate to moderately severe disc space and endplate degeneration at  C3-4, C4-5 and C5-6.    KARISSA WILEY MD         SYSTEM ID:  [id number]   XR Hand Left G/E 3 Views    Narrative    Exam: XR HAND LEFT G/E 3 VIEWS     History:Female, age 83 years, Fall    Comparison:  No relevant prior imaging.    Technique: Three views are submitted.    Findings: Bones are normally mineralized. No evidence of acute or  subacute fracture.  No evidence of dislocation.  Mild-to-moderate  degenerative changes seen within the distal interphalangeal joints of  the index and middle finger.           Impression    Impression:  No evidence of acute or subacute bony abnormality.    KARISSA WILEY MD         SYSTEM ID:  [id number]       Medications - No data to display    Assessments & Plan (with Medical Decision Making)     I have reviewed the nursing notes.    I have reviewed the findings, diagnosis, plan and need for follow up with the patient.    Summary:  Patient presents to the ER today for fall.  Potential diagnosis which have been considered and evaluated include intracerebral bleed, skull fracture, spinal fracture/subluxation, chest/lung injury, extremity injury, as well as others. Many of these have been excluded using the various modalities and assessment as noted on the chart. At the present time, the diagnosis given seems to be the most likely contusion to the face with  abrasions to the face, left hand, and bruising to the left eye.  Upon arrival, vitals signs are normal.  The patient is alert and oriented.  Trauma examination done on arrival and no trauma team activation required.  Patient has multiple abrasions to face and hand with some bruising to the left eye.  Chipping of tooth #10 is also noted but patient has followed up with dentist in regards to this.  CT of head, face, and cervical spine conducted showing no obvious abnormality.  X-ray of left hand personally reviewed showing no fracture or dislocation.    Patient doing well without any complaints.  Will discharge home to do standard wound care as discussed in AVS.  Acetaminophen/ibuprofen for pain control.  Continue previously prescribed tramadol.  Follow-up with PCP as needed and return to ER if new or worse symptoms.  Patient verbalized understanding these plan of care.  Patient discharged home.      Critical Care Time: None    Impression and plan discussed with patient. Questions answered, concerns addressed, indications for urgent re-evaluation reviewed, and  given. Patient/Parent/Caregiver agree with treatment plan and have no further questions at this time.  AVS provided at discharge.    This note was created by the Dragon Voice Dictation System. Inadvertent typographical errors, due to software recognition problems, may still exist.             New Prescriptions    No medications on file       Final diagnoses:   Fall at home, initial encounter   Facial contusion, initial encounter   Abrasion of face, initial encounter   Abrasion of left hand, initial encounter   Eye bruise, left, initial encounter       10/18/2024   [facility]       Leandro Grimes APRN CNP  10/18/24 [id number]

## 2024-11-05 NOTE — PHARMACY-ADMISSION MEDICATION HISTORY
Admission medication history interview status for this patient is complete. See [facility] admission navigator for allergy information, prior to admission medications and immunization status.     Medication history interview source(s):Patient  Medication history resources (including written lists, pill bottles, clinic record):Epic    Changes made to PTA medication list:  Added: Velphoro  Deleted: Tums  Changed: Lantus - from 50 units daily to 25 units bid; Pregabalin    Medication reconciliation/reorder completed by provider prior to medication history? Yes    Do you take OTC medications (eg tylenol, ibuprofen, fish oil, eye/ear drops, etc)? Y(Y/N)    For patients on insulin therapy: Y  Lantus 25 units BID    Insulin Lispro (HUMALOG PEN SC) Take 15 units TID and an additional 2 units if BG is over 150         Prior to Admission medications    Medication Sig Last Dose Taking? Auth Provider   abacavir (ZIAGEN) 300 MG tablet Take 600 mg by mouth every evening  7/11/2018 at Unknown time Yes Abstract, Provider   Acetaminophen (TYLENOL PO) Take by mouth as needed for mild pain or fever 7/12/2018 at Unknown time Yes Unknown, Entered By History   albuterol (PROAIR HFA/PROVENTIL HFA/VENTOLIN HFA) 108 (90 BASE) MCG/ACT Inhaler Inhale 2 puffs into the lungs every 6 hours as needed for shortness of breath / dyspnea or wheezing  Yes Nelson Worthy MD   aspirin EC 81 MG EC tablet Take 1 tablet (81 mg) by mouth daily 7/11/2018 at hs Yes Clemencia Pal MD   atorvastatin (LIPITOR) 40 MG tablet Take 40 mg by mouth At Bedtime 7/11/2018 at Unknown time Yes Unknown, Entered By History   B COMPLEX-C-FOLIC ACID PO Take 1 tablet by mouth At Bedtime  7/11/2018 at Unknown time Yes Reported, Patient   carvedilol (COREG) 12.5 MG tablet Take 1 tablet (12.5 mg) by mouth 2 times daily (with meals) 7/11/2018 at Unknown time Yes Clemencia Pal MD   chlorhexidine (CHLORHEXIDINE) 0.12 % solution Rinse and gargle 15 ml by mouth twice a day as  directed. 7/11/2018 at Unknown time Yes Abstract, Provider   CLONAZEPAM PO Take 0.5 mg by mouth nightly as needed for anxiety (restless legs)  Yes Unknown, Entered By History   CLOPIDOGREL BISULFATE PO Take 75 mg by mouth every evening  7/11/2018 at Unknown time Yes Unknown, Entered By History   dapsone 100 MG tablet Take 100 mg by mouth At Bedtime  7/11/2018 at Unknown time Yes Reported, Patient   Darunavir Ethanolate (PREZISTA PO) Take 800 mg by mouth At Bedtime. 7/11/2018 at Unknown time Yes Reported, Patient   dolutegravir (TIVICAY) 50 MG tablet Take 50 mg by mouth At Bedtime 7/11/2018 at Unknown time Yes Unknown, Entered By History   DOXERCALCIFEROL IV Inject 6 mcg into the vein three times a week (with dialysis)  Yes Reported, Patient   epoetin brittni (EPOGEN,PROCRIT) 93368 UNIT/ML injection Inject 11,000 Units Subcutaneous three times a week WITH DIALYSIS  Yes Unknown, Entered By History   guaiFENesin (MUCINEX) 600 MG 12 hr tablet Take by mouth as needed for congestion  Yes Unknown, Entered By History   hydrALAZINE (APRESOLINE) 25 MG tablet Take 1 tablet (25 mg) by mouth 3 times daily 7/11/2018 at Unknown time Yes Clemencia Pal MD   imiquimod (ALDARA) 5 % cream Apply topically as needed  Yes Reported, Patient   insulin glargine (LANTUS) 100 UNIT/ML injection Inject 25 Units Subcutaneous 2 times daily  7/11/2018 at Unknown time Yes Unknown, Entered By History   Insulin Lispro (HUMALOG PEN SC) Take 15 units TID and an additional 2 units if BG is over 150  Yes Unknown, Entered By History   ipratropium - albuterol 0.5 mg/2.5 mg/3 mL (DUONEB) 0.5-2.5 (3) MG/3ML neb solution Take 1 vial (3 mLs) by nebulization every 6 hours as needed for shortness of breath / dyspnea or wheezing  Yes Catrachito Rosado DO   irbesartan (AVAPRO) 300 MG tablet Take 1 tablet (300 mg) by mouth At Bedtime 7/11/2018 at Unknown time Yes Clemencia aPl MD   iron sucrose (VENOFER) 20 MG/ML injection Inject 50 mg into the vein  "once a week WIth dialysis  Yes Unknown, Entered By History   Isosorbide Mononitrate  MG TB24 Take 1 tablet (120 mg) by mouth every evening 7/11/2018 at Unknown time Yes Yuki Hinojosa APRN CNP   MAGNESIUM OXIDE PO Take 400 mg by mouth At Bedtime 7/11/2018 at Unknown time Yes Unknown, Entered By History   nitroglycerin (NITROSTAT) 0.4 MG SL tablet One tablet under the tongue every 5 minutes if needed for chest pain. May repeat every 5 minutes for a maximum of 3 doses in 15 minutes\"  Yes Lay Paz MD   Nutritional Supplements (NEPRO PO) 1-3 times daily  Yes Unknown, Entered By History   omega-3 acid ethyl esters (LOVAZA) 1 G capsule Take 2 g by mouth 2 times daily 7/11/2018 at Unknown time Yes Unknown, Entered By History   omeprazole (PRILOSEC) 40 MG capsule Take 40 mg by mouth daily 1 hour before dinner 7/11/2018 at Unknown time Yes Unknown, Entered By History   pregabalin (LYRICA) 100 MG capsule Take 100 mg by mouth At Bedtime  Yes Unknown, Entered By History   pregabalin (LYRICA) 25 MG capsule Take 25 mg by mouth On dialysis days in AM  Yes Unknown, Entered By History   ritonavir (NORVIR) 100 MG capsule Take 1 capsule by mouth At Bedtime  7/11/2018 at Unknown time Yes Reported, Patient   sucroferric oxyhydroxide (VELPHORO) 500 MG CHEW chewable tablet Take 500 mg by mouth 3 times daily (with meals)  Yes Unknown, Entered By History   terbinafine (LAMISIL AT) 1 % cream Apply topically 2 times daily as needed   Yes Abstract, Provider   mycophenolate (GENERIC EQUIVALENT) 500 MG tablet Take 1 tablet (500 mg) by mouth 2 times daily On an empty stomach Not at this time, using when gets blood transfusion  Sherice Bernardo MD   order for DME Equipment being ordered: Other: 4WW  Treatment Diagnosis: Decreased activity tolerance   Lorna Jhaveri MD           " Patient is okay being transfused is necessary

## 2024-11-08 NOTE — IP AVS SNAPSHOT
[place] [address] [facility]    [address]    [facility] [address]    Phone:  [phone]    Fax:  [phone]                                       After Visit Summary   11/21/2017    Donald Raza    MRN: [id number]           After Visit Summary Signature Page     I have received my discharge instructions, and my questions have been answered. I have discussed any challenges I see with this plan with the nurse or doctor.    ..........................................................................................................................................  Patient/Patient Representative Signature      ..........................................................................................................................................  Patient Representative Print Name and Relationship to Patient    ..................................................               ................................................  Date                                            Time    ..........................................................................................................................................  Reviewed by Signature/Title    ...................................................              ..............................................  Date                                                            Time           [FreeTextEntry1] : PFT demonstrated diminished FEV1 FVC and ratio, 62%  TLC normal DLCO normal     ALEXIA ANGEL		Report Date:	15-Jul-2024 17:01.00 Patient ID:	[id number] ([id number]), [id number] (EPI)		Accession No.:	[id number] Patient Birth Date:	16-Sep-1964		Report Status:	F Referring Physician:	[id number] Darci Sal		Reason For Study:	[facility]     ACC: [id number] EXAM: CT CHEST IC ORDERED BY: Jonelle Bejarano  PROCEDURE DATE: 07/15/2024    INTERPRETATION: Clinical information: Pneumonia.  CT scan of the chest was obtained following administration of intravenous contrast. Approximately 70 cc of Omnipaque 350 was administered and 30 cc was discarded.  No hilar or mediastinal adenopathy is noted.  Heart is enlarged in size. No pericardial effusion is noted.  No endobronchial lesions are noted. Fairly large consolidation is noted in the right upper lobe. Very small right pleural effusion is noted.  Below the diaphragm, visualized portions of the abdomen demonstrate calcifications within both kidneys, left more than right. Small low-attenuation lesions in the right kidney are too small to be adequately characterized on this exam.  Degenerative changes of the spine are noted.  IMPRESSION: Fairly large consolidation is noted in the right upper lobe. Exact etiology is unclear. Primary consideration is pneumonia. Follow-up CT scan is recommended in 3 months to ensure complete resolution.  --- End of Report ---      Kevin Marcano MD

## (undated) DEVICE — CATH ANGIO INFINITI AL1 4FRX100CM 538445

## (undated) DEVICE — Device

## (undated) DEVICE — SHEATH PRELUDE SNAP 13CM 6FR

## (undated) DEVICE — PACK PCMKR PERM SRG PROC LF SAN32PC573

## (undated) DEVICE — GLIDEWIRE STRAIGHT .035CM GR3501

## (undated) DEVICE — GUIDEWIRE VASC 0.035INX150CM INQWIRE J TIP IQ35F150J3F/A

## (undated) DEVICE — RAD INTRODUCER KIT MICRO 5FRX10CM .018 NITINOL G/W

## (undated) DEVICE — MANIFOLD KIT ANGIO AUTOMATED 014613

## (undated) DEVICE — CATH DIAG 4FR JL 4.5 538417

## (undated) DEVICE — KIT LG BORE TOUHY ACCESS PLUS MAP152

## (undated) DEVICE — INTRO SHEATH 4FRX10CM PINNACLE RSS402

## (undated) DEVICE — CATH ANGIO INFINITI 3DRC 4FRX100CM 538476

## (undated) DEVICE — INTRO SHEATH 7FRX10CM PINNACLE RSS702

## (undated) DEVICE — CABLE PACING ALLIGATOR CLIP 301-CG

## (undated) DEVICE — KIT HAND CONTROL ANGIOTOUCH ACIST 65CM AT-P65

## (undated) DEVICE — GUIDEWIRE VERRATA PLUS PRESSURE 185CM JTIP 10185JP

## (undated) DEVICE — DEFIB PRO-PADZ LVP LQD GEL ADULT 8900-2105-01

## (undated) RX ORDER — HYDRALAZINE HYDROCHLORIDE 20 MG/ML
INJECTION INTRAMUSCULAR; INTRAVENOUS
Status: DISPENSED
Start: 2019-01-25

## (undated) RX ORDER — NITROGLYCERIN 5 MG/ML
VIAL (ML) INTRAVENOUS
Status: DISPENSED
Start: 2017-10-11

## (undated) RX ORDER — HEPARIN SODIUM 1000 [USP'U]/ML
INJECTION, SOLUTION INTRAVENOUS; SUBCUTANEOUS
Status: DISPENSED
Start: 2017-10-11

## (undated) RX ORDER — FENTANYL CITRATE 50 UG/ML
INJECTION, SOLUTION INTRAMUSCULAR; INTRAVENOUS
Status: DISPENSED
Start: 2017-10-11

## (undated) RX ORDER — FENTANYL CITRATE 50 UG/ML
INJECTION, SOLUTION INTRAMUSCULAR; INTRAVENOUS
Status: DISPENSED
Start: 2019-05-02

## (undated) RX ORDER — BUPIVACAINE HYDROCHLORIDE 2.5 MG/ML
INJECTION, SOLUTION EPIDURAL; INFILTRATION; INTRACAUDAL
Status: DISPENSED
Start: 2019-05-02

## (undated) RX ORDER — ADENOSINE 3 MG/ML
INJECTION, SOLUTION INTRAVENOUS
Status: DISPENSED
Start: 2017-10-11

## (undated) RX ORDER — LIDOCAINE HYDROCHLORIDE 10 MG/ML
INJECTION, SOLUTION EPIDURAL; INFILTRATION; INTRACAUDAL; PERINEURAL
Status: DISPENSED
Start: 2017-10-11